# Patient Record
Sex: FEMALE | Race: WHITE | NOT HISPANIC OR LATINO | URBAN - METROPOLITAN AREA
[De-identification: names, ages, dates, MRNs, and addresses within clinical notes are randomized per-mention and may not be internally consistent; named-entity substitution may affect disease eponyms.]

---

## 2017-05-02 ENCOUNTER — EMERGENCY (EMERGENCY)
Facility: HOSPITAL | Age: 67
LOS: 1 days | Discharge: HOME | End: 2017-05-02
Attending: EMERGENCY MEDICINE | Admitting: EMERGENCY MEDICINE
Payer: MEDICARE

## 2017-05-02 VITALS
SYSTOLIC BLOOD PRESSURE: 160 MMHG | DIASTOLIC BLOOD PRESSURE: 90 MMHG | RESPIRATION RATE: 18 BRPM | HEART RATE: 80 BPM | OXYGEN SATURATION: 97 %

## 2017-05-02 VITALS
OXYGEN SATURATION: 97 % | DIASTOLIC BLOOD PRESSURE: 52 MMHG | SYSTOLIC BLOOD PRESSURE: 125 MMHG | HEART RATE: 67 BPM | TEMPERATURE: 98 F | RESPIRATION RATE: 19 BRPM

## 2017-05-02 DIAGNOSIS — F22 DELUSIONAL DISORDERS: ICD-10-CM

## 2017-05-02 DIAGNOSIS — Z98.89 OTHER SPECIFIED POSTPROCEDURAL STATES: Chronic | ICD-10-CM

## 2017-05-02 DIAGNOSIS — Z79.82 LONG TERM (CURRENT) USE OF ASPIRIN: ICD-10-CM

## 2017-05-02 DIAGNOSIS — Z90.89 ACQUIRED ABSENCE OF OTHER ORGANS: ICD-10-CM

## 2017-05-02 DIAGNOSIS — Z79.899 OTHER LONG TERM (CURRENT) DRUG THERAPY: ICD-10-CM

## 2017-05-02 DIAGNOSIS — E78.5 HYPERLIPIDEMIA, UNSPECIFIED: ICD-10-CM

## 2017-05-02 DIAGNOSIS — Z90.49 ACQUIRED ABSENCE OF OTHER SPECIFIED PARTS OF DIGESTIVE TRACT: Chronic | ICD-10-CM

## 2017-05-02 DIAGNOSIS — R26.2 DIFFICULTY IN WALKING, NOT ELSEWHERE CLASSIFIED: ICD-10-CM

## 2017-05-02 DIAGNOSIS — I10 ESSENTIAL (PRIMARY) HYPERTENSION: ICD-10-CM

## 2017-05-02 DIAGNOSIS — E03.9 HYPOTHYROIDISM, UNSPECIFIED: ICD-10-CM

## 2017-05-02 DIAGNOSIS — J44.9 CHRONIC OBSTRUCTIVE PULMONARY DISEASE, UNSPECIFIED: ICD-10-CM

## 2017-05-02 DIAGNOSIS — Z90.49 ACQUIRED ABSENCE OF OTHER SPECIFIED PARTS OF DIGESTIVE TRACT: ICD-10-CM

## 2017-05-02 DIAGNOSIS — F32.9 MAJOR DEPRESSIVE DISORDER, SINGLE EPISODE, UNSPECIFIED: ICD-10-CM

## 2017-05-02 DIAGNOSIS — Z90.5 ACQUIRED ABSENCE OF KIDNEY: Chronic | ICD-10-CM

## 2017-05-02 DIAGNOSIS — F29 UNSPECIFIED PSYCHOSIS NOT DUE TO A SUBSTANCE OR KNOWN PHYSIOLOGICAL CONDITION: ICD-10-CM

## 2017-05-02 LAB
ALBUMIN SERPL ELPH-MCNC: 4 G/DL — SIGNIFICANT CHANGE UP (ref 3.3–5)
ALP SERPL-CCNC: 141 U/L — HIGH (ref 40–120)
ALT FLD-CCNC: 34 U/L RC — SIGNIFICANT CHANGE UP (ref 10–45)
ANION GAP SERPL CALC-SCNC: 18 MMOL/L — HIGH (ref 5–17)
APAP SERPL-MCNC: <15 UG/ML — SIGNIFICANT CHANGE UP (ref 10–30)
AST SERPL-CCNC: 35 U/L — SIGNIFICANT CHANGE UP (ref 10–40)
BASOPHILS # BLD AUTO: 0.1 K/UL — SIGNIFICANT CHANGE UP (ref 0–0.2)
BASOPHILS NFR BLD AUTO: 0.7 % — SIGNIFICANT CHANGE UP (ref 0–2)
BILIRUB SERPL-MCNC: 1.1 MG/DL — SIGNIFICANT CHANGE UP (ref 0.2–1.2)
BUN SERPL-MCNC: 14 MG/DL — SIGNIFICANT CHANGE UP (ref 7–23)
CALCIUM SERPL-MCNC: 10.2 MG/DL — SIGNIFICANT CHANGE UP (ref 8.4–10.5)
CHLORIDE SERPL-SCNC: 89 MMOL/L — LOW (ref 96–108)
CO2 SERPL-SCNC: 26 MMOL/L — SIGNIFICANT CHANGE UP (ref 22–31)
CREAT SERPL-MCNC: 0.84 MG/DL — SIGNIFICANT CHANGE UP (ref 0.5–1.3)
EOSINOPHIL # BLD AUTO: 0.2 K/UL — SIGNIFICANT CHANGE UP (ref 0–0.5)
EOSINOPHIL NFR BLD AUTO: 1.6 % — SIGNIFICANT CHANGE UP (ref 0–6)
GLUCOSE SERPL-MCNC: 115 MG/DL — HIGH (ref 70–99)
HCT VFR BLD CALC: 44.4 % — SIGNIFICANT CHANGE UP (ref 34.5–45)
HGB BLD-MCNC: 15.3 G/DL — SIGNIFICANT CHANGE UP (ref 11.5–15.5)
LYMPHOCYTES # BLD AUTO: 1.4 K/UL — SIGNIFICANT CHANGE UP (ref 1–3.3)
LYMPHOCYTES # BLD AUTO: 12.5 % — LOW (ref 13–44)
MCHC RBC-ENTMCNC: 32.2 PG — SIGNIFICANT CHANGE UP (ref 27–34)
MCHC RBC-ENTMCNC: 34.4 GM/DL — SIGNIFICANT CHANGE UP (ref 32–36)
MCV RBC AUTO: 93.4 FL — SIGNIFICANT CHANGE UP (ref 80–100)
MONOCYTES # BLD AUTO: 0.9 K/UL — SIGNIFICANT CHANGE UP (ref 0–0.9)
MONOCYTES NFR BLD AUTO: 8.2 % — SIGNIFICANT CHANGE UP (ref 2–14)
NEUTROPHILS # BLD AUTO: 8.5 K/UL — HIGH (ref 1.8–7.4)
NEUTROPHILS NFR BLD AUTO: 77 % — SIGNIFICANT CHANGE UP (ref 43–77)
PLATELET # BLD AUTO: 174 K/UL — SIGNIFICANT CHANGE UP (ref 150–400)
POTASSIUM SERPL-MCNC: 3.8 MMOL/L — SIGNIFICANT CHANGE UP (ref 3.5–5.3)
POTASSIUM SERPL-SCNC: 3.8 MMOL/L — SIGNIFICANT CHANGE UP (ref 3.5–5.3)
PROT SERPL-MCNC: 7.9 G/DL — SIGNIFICANT CHANGE UP (ref 6–8.3)
RBC # BLD: 4.75 M/UL — SIGNIFICANT CHANGE UP (ref 3.8–5.2)
RBC # FLD: 13.4 % — SIGNIFICANT CHANGE UP (ref 10.3–14.5)
SALICYLATES SERPL-MCNC: <2 MG/DL — LOW (ref 15–30)
SODIUM SERPL-SCNC: 133 MMOL/L — LOW (ref 135–145)
WBC # BLD: 11 K/UL — HIGH (ref 3.8–10.5)
WBC # FLD AUTO: 11 K/UL — HIGH (ref 3.8–10.5)

## 2017-05-02 PROCEDURE — 99283 EMERGENCY DEPT VISIT LOW MDM: CPT | Mod: 25

## 2017-05-02 PROCEDURE — 93005 ELECTROCARDIOGRAM TRACING: CPT

## 2017-05-02 PROCEDURE — 85027 COMPLETE CBC AUTOMATED: CPT

## 2017-05-02 PROCEDURE — 99285 EMERGENCY DEPT VISIT HI MDM: CPT

## 2017-05-02 PROCEDURE — 80053 COMPREHEN METABOLIC PANEL: CPT

## 2017-05-02 PROCEDURE — 80307 DRUG TEST PRSMV CHEM ANLYZR: CPT

## 2017-05-02 PROCEDURE — 93010 ELECTROCARDIOGRAM REPORT: CPT

## 2017-05-02 PROCEDURE — 99284 EMERGENCY DEPT VISIT MOD MDM: CPT | Mod: 25,GC

## 2017-05-02 NOTE — ED PROVIDER NOTE - PROGRESS NOTE DETAILS
Micah: Patient signed out to f/u  picking patient up from hospital. Nursing staff and resident went to check on patient and patient not in chair. Patient paged overhead. Called  number and patient answered and states she is at home. Resident spoke with patient  and states he came to  patient and they left. Patient is safely at home.

## 2017-05-02 NOTE — ED PROVIDER NOTE - FAMILY HISTORY
<<-----Click on this checkbox to enter Family History Family history of myocardial infarction, mother  at age 67 and father at age 67 of MI's     Family history of hypertension, mother and father     Sibling  Still living? Yes, Estimated age: Age Unknown  Family history of diabetes mellitus, Age at diagnosis: Age Unknown  Family history of heart disease, Age at diagnosis: Age Unknown     Child  Still living? Yes, Estimated age: Age Unknown  Family history of thyroid cancer, Age at diagnosis: Age Unknown

## 2017-05-02 NOTE — ED BEHAVIORAL HEALTH ASSESSMENT NOTE - NS ED BHA PLAN TR BH CONTACTED FT
Called Dr. De La Paz's office, he was unavailable but  confirmed upcoming appointment. Called Dr. De La Paz's office, confirmed follow up appt for patient, Dr. De La Paz wasn't available

## 2017-05-02 NOTE — ED BEHAVIORAL HEALTH ASSESSMENT NOTE - OTHER PAST PSYCHIATRIC HISTORY (INCLUDE DETAILS REGARDING ONSET, COURSE OF ILLNESS, INPATIENT/OUTPATIENT TREATMENT)
The patient has reportedly been hospitalized psychiatrically 5 times in the past. most recent at [facility] in 2015. The patient received ECT during that hospitalization. She carries diagnoses of major depressive disorder and delusional disorder, and currently follows up with Dr. Emiliano De La Paz as an outpatient. The patient has reportedly been hospitalized psychiatrically 5 times in the past. most recent at [facility] in 11/2015. The patient received ECT during that hospitalization. She carries diagnoses of major depressive disorder and delusional disorder, and currently follows up with Dr. Emiliano De La Paz as an outpatient.

## 2017-05-02 NOTE — ED PROVIDER NOTE - PMH
Anxiety    Breast cancer in situ, left    COPD (chronic obstructive pulmonary disease)    DVT (deep venous thrombosis)  history of- not presently on A/C  Graves disease    Hyperlipidemia    Hypertension    Hypothyroid    Kidney cancer, primary, with metastasis from kidney to other site, left  LEft sided- s/p nephrectomy only- no chemo or RT  Obesity

## 2017-05-02 NOTE — ED BEHAVIORAL HEALTH ASSESSMENT NOTE - DIFFERENTIAL
Psychotic disorder not otherwise specified vs. delusional disorder vs. schizophrenia vs. major depressive disorder with psychotic features vs. delirium vs. dementia Psychotic disorder not otherwise specified vs. delusional disorder vs.. major depressive disorder with psychotic features
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
## 2017-05-02 NOTE — ED BEHAVIORAL HEALTH ASSESSMENT NOTE - DESCRIPTION
The patient has been calling out for her  to help her, crying intermittently. The patient received Ativan PRN for anxiety. She has been medically evaluated and cleared from a medical perspective. Breast cancer, COPD, DVT, Grave's disease, HLD, HTN, hypothyroidism, kidney cancer, s/p nephrectomy, s/p laminectomy, s/p cholecystectomy, s/p parathyroidectomy The patient currently resides in a private residence with her  and adult son, who is developmentally delayed. She has a total of 3 children and has been  for approximately 47 years. The patient completed a 2-year college degree. She is currently unemployed, but used to work in retail. Patient has been cooperative and calm in ED.

## 2017-05-02 NOTE — ED PROVIDER NOTE - ATTENDING CONTRIBUTION TO CARE
****ATTENDING**** 65yo female hx hypothyroid, HTN, Asthma presents co asthma exacerbation. States her  is trying to hurt her by stimulating her asthma. She feels unsafe at home so she came here. On exam, Patient is awake,alert,oriented x 3. Patient's chest is clear to ausculation, +s1s2. Abdomen is soft nd/nt +BS. Patient w no wheezing at this time. CHeck Labs, Xray chest. Consult social work and psych.

## 2017-05-02 NOTE — ED BEHAVIORAL HEALTH ASSESSMENT NOTE - OTHER
Spouse Unable to ambulate at present where bed available Unable to care for self Pt reports now being able to walk 450 ft in physical therapy and looks fwd to walking again.
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
## 2017-05-02 NOTE — ED BEHAVIORAL HEALTH ASSESSMENT NOTE - CURRENT MEDICATION
Risperdal 0.5mg qhs, ativan 1mg TID, levothyroxine, pantoprazole 40mg, folic acid 1 mg, amlodipine, asa 81 qd, atenolol, oxycodone 5mg QID PRN, lipitor 40 qhs, duloxetine 60mg qhs, mirtazapine 7.5mg qhs, olmosertan 40mg, Vitamin B12, Vitamin D3, gabapentin 400mg qhs, inhalers for asthma- proair and incruse, nebulizer.

## 2017-05-02 NOTE — ED BEHAVIORAL HEALTH ASSESSMENT NOTE - SUICIDE PROTECTIVE FACTORS
Supportive social network or family/Identifies reasons for living/Positive therapeutic relationships Supportive social network or family/Positive therapeutic relationships/Identifies reasons for living/Future oriented/Other

## 2017-05-02 NOTE — ED PROVIDER NOTE - PSH
History of carpal tunnel release  right wrist  History of eye surgery  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  History of parathyroidectomy    History of pelvic surgery  Pelvic Sling  S/P breast biopsy  left  S/P cholecystectomy    S/P laminectomy  now bed and wheelchair ridden with severe pain  S/p nephrectomy  left

## 2017-05-02 NOTE — ED BEHAVIORAL HEALTH ASSESSMENT NOTE - RISK ASSESSMENT
The patient is at moderate risk for acute and chronic suicide and homicide. Her risk factors include current psychotic episode, impulsivity, multiple chronic medical conditions, unemployed/disabled, anxiety, insomnia, and history of aggression. Her protective factors include no current suicidal or homicidal ideation, no history of suicide attempts, living with family, caretaker status, good social supports, no access to firearms, and no current substance abuse. The patient is at minimal risk for acute and chronic suicide and homicide. Her risk factors include current psychotic episode, impulsivity, multiple chronic medical conditions, unemployed/disabled, anxiety, insomnia, and history of aggression. Her protective factors include no current suicidal or homicidal ideation, no history of suicide attempts, living with family, caretaker status, good social supports, no access to firearms, looking forward to walking again and recent progress made in physical therapy with walking, and no current substance abuse. The patient is at minimal risk for acute and chronic suicide and homicide. Her risk factors include current paranoia, impulsivity, multiple chronic medical conditions, unemployed/disabled, anxiety, insomnia, and history of aggression. Her protective factors include no current suicidal or homicidal ideation, no history of suicide attempts, living with family, caretaker status, good social supports, no access to firearms, looking forward to walking again and recent progress made in physical therapy with walking, and no current substance abuse.
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
## 2017-05-02 NOTE — ED BEHAVIORAL HEALTH ASSESSMENT NOTE - ADDITIONAL DETAILS / COMMENTS
The patient was unable to participate in a full MMSE, but was unable to speel "WORLD" backwards or recall 3 words. She was able to follow a 3-step command, name 2 objects, repeat a phrase, and register 3 words.

## 2017-05-02 NOTE — ED ADULT NURSE NOTE - ADDITIONAL PRINTED INSTRUCTIONS GIVEN
Pt was waiting for d/c and left prior to signing paper work. MD Oswald made aware and pt called, states she is home and safe. MD states no further intervention is needed.
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
## 2017-05-14 ENCOUNTER — INPATIENT (INPATIENT)
Facility: HOSPITAL | Age: 67
LOS: 4 days | Discharge: HOME | DRG: 880 | End: 2017-05-19
Attending: GENERAL PRACTICE | Admitting: GENERAL PRACTICE
Payer: MEDICARE

## 2017-05-14 VITALS
OXYGEN SATURATION: 99 % | TEMPERATURE: 99 F | RESPIRATION RATE: 20 BRPM | SYSTOLIC BLOOD PRESSURE: 154 MMHG | HEART RATE: 88 BPM | DIASTOLIC BLOOD PRESSURE: 88 MMHG

## 2017-05-14 DIAGNOSIS — Z90.49 ACQUIRED ABSENCE OF OTHER SPECIFIED PARTS OF DIGESTIVE TRACT: Chronic | ICD-10-CM

## 2017-05-14 DIAGNOSIS — Z98.89 OTHER SPECIFIED POSTPROCEDURAL STATES: Chronic | ICD-10-CM

## 2017-05-14 DIAGNOSIS — R25.1 TREMOR, UNSPECIFIED: ICD-10-CM

## 2017-05-14 DIAGNOSIS — Z90.5 ACQUIRED ABSENCE OF KIDNEY: Chronic | ICD-10-CM

## 2017-05-14 LAB
ALBUMIN SERPL ELPH-MCNC: 4.3 G/DL — SIGNIFICANT CHANGE UP (ref 3.3–5)
ALP SERPL-CCNC: 148 U/L — HIGH (ref 40–120)
ALT FLD-CCNC: 46 U/L RC — HIGH (ref 10–45)
ANION GAP SERPL CALC-SCNC: 19 MMOL/L — HIGH (ref 5–17)
APTT BLD: 37.9 SEC — HIGH (ref 27.5–37.4)
AST SERPL-CCNC: 37 U/L — SIGNIFICANT CHANGE UP (ref 10–40)
BASOPHILS # BLD AUTO: 0 K/UL — SIGNIFICANT CHANGE UP (ref 0–0.2)
BASOPHILS NFR BLD AUTO: 0.1 % — SIGNIFICANT CHANGE UP (ref 0–2)
BILIRUB SERPL-MCNC: 1 MG/DL — SIGNIFICANT CHANGE UP (ref 0.2–1.2)
BUN SERPL-MCNC: 11 MG/DL — SIGNIFICANT CHANGE UP (ref 7–23)
CALCIUM SERPL-MCNC: 10.4 MG/DL — SIGNIFICANT CHANGE UP (ref 8.4–10.5)
CHLORIDE SERPL-SCNC: 93 MMOL/L — LOW (ref 96–108)
CK MB CFR SERPL CALC: 2.9 NG/ML — SIGNIFICANT CHANGE UP (ref 0–3.8)
CK SERPL-CCNC: 86 U/L — SIGNIFICANT CHANGE UP (ref 25–170)
CO2 SERPL-SCNC: 23 MMOL/L — SIGNIFICANT CHANGE UP (ref 22–31)
CREAT SERPL-MCNC: 0.8 MG/DL — SIGNIFICANT CHANGE UP (ref 0.5–1.3)
EOSINOPHIL # BLD AUTO: 0 K/UL — SIGNIFICANT CHANGE UP (ref 0–0.5)
EOSINOPHIL NFR BLD AUTO: 0 % — SIGNIFICANT CHANGE UP (ref 0–6)
GAS PNL BLDV: SIGNIFICANT CHANGE UP
GLUCOSE SERPL-MCNC: 159 MG/DL — HIGH (ref 70–99)
HCT VFR BLD CALC: 47.2 % — HIGH (ref 34.5–45)
HGB BLD-MCNC: 16.2 G/DL — HIGH (ref 11.5–15.5)
INR BLD: 1.1 RATIO — SIGNIFICANT CHANGE UP (ref 0.88–1.16)
LYMPHOCYTES # BLD AUTO: 1 K/UL — SIGNIFICANT CHANGE UP (ref 1–3.3)
LYMPHOCYTES # BLD AUTO: 10.4 % — LOW (ref 13–44)
MCHC RBC-ENTMCNC: 32.9 PG — SIGNIFICANT CHANGE UP (ref 27–34)
MCHC RBC-ENTMCNC: 34.4 GM/DL — SIGNIFICANT CHANGE UP (ref 32–36)
MCV RBC AUTO: 95.6 FL — SIGNIFICANT CHANGE UP (ref 80–100)
MONOCYTES # BLD AUTO: 0.1 K/UL — SIGNIFICANT CHANGE UP (ref 0–0.9)
MONOCYTES NFR BLD AUTO: 1 % — LOW (ref 2–14)
NEUTROPHILS # BLD AUTO: 8.6 K/UL — HIGH (ref 1.8–7.4)
NEUTROPHILS NFR BLD AUTO: 88.5 % — HIGH (ref 43–77)
PLATELET # BLD AUTO: 160 K/UL — SIGNIFICANT CHANGE UP (ref 150–400)
POTASSIUM SERPL-MCNC: 4 MMOL/L — SIGNIFICANT CHANGE UP (ref 3.5–5.3)
POTASSIUM SERPL-SCNC: 4 MMOL/L — SIGNIFICANT CHANGE UP (ref 3.5–5.3)
PROT SERPL-MCNC: 8.3 G/DL — SIGNIFICANT CHANGE UP (ref 6–8.3)
PROTHROM AB SERPL-ACNC: 12 SEC — SIGNIFICANT CHANGE UP (ref 9.8–12.7)
RBC # BLD: 4.94 M/UL — SIGNIFICANT CHANGE UP (ref 3.8–5.2)
RBC # FLD: 13.1 % — SIGNIFICANT CHANGE UP (ref 10.3–14.5)
SODIUM SERPL-SCNC: 135 MMOL/L — SIGNIFICANT CHANGE UP (ref 135–145)
TROPONIN T SERPL-MCNC: <0.01 NG/ML — SIGNIFICANT CHANGE UP (ref 0–0.06)
WBC # BLD: 9.8 K/UL — SIGNIFICANT CHANGE UP (ref 3.8–10.5)
WBC # FLD AUTO: 9.8 K/UL — SIGNIFICANT CHANGE UP (ref 3.8–10.5)

## 2017-05-14 PROCEDURE — 93010 ELECTROCARDIOGRAM REPORT: CPT

## 2017-05-14 PROCEDURE — 99285 EMERGENCY DEPT VISIT HI MDM: CPT | Mod: 25,GC

## 2017-05-14 RX ORDER — IPRATROPIUM/ALBUTEROL SULFATE 18-103MCG
3 AEROSOL WITH ADAPTER (GRAM) INHALATION ONCE
Qty: 0 | Refills: 0 | Status: COMPLETED | OUTPATIENT
Start: 2017-05-14 | End: 2017-05-14

## 2017-05-14 RX ORDER — ASPIRIN/CALCIUM CARB/MAGNESIUM 324 MG
162 TABLET ORAL ONCE
Qty: 0 | Refills: 0 | Status: COMPLETED | OUTPATIENT
Start: 2017-05-14 | End: 2017-05-14

## 2017-05-14 RX ADMIN — Medication 3 MILLILITER(S): at 22:45

## 2017-05-14 NOTE — ED PROVIDER NOTE - OBJECTIVE STATEMENT
BIBEMS for HTN BIBEMS for HTN and tremors. When pt was seen by the EMS providers, pt had UE tremors, but no LOC or AMS.   Recently discharged from [facility] for reported allergic reaction with throat closure and difficulty swallowing.   Patient w/ L sided headache and chest tightness for 6 weeks. Has been having upper extremity shaking that patient believes is seizures, has been present for months, and seen by multiple medical providers for this issue. No Hx of seizures.     PMD: Gorge Johns

## 2017-05-14 NOTE — ED PROVIDER NOTE - NS ED ROS FT
No fever, no chills, no change in vision, no change in hearing, + chest pain, + shortness of breath, no abdominal pain, no vomiting, no dysuria, no muscle pain, no rashes, no loss of consciousness. ~ Thanh Pack MD

## 2017-05-14 NOTE — ED ADULT NURSE NOTE - OBJECTIVE STATEMENT
66 y.o. female presents to ED via EMS c/o HTN, HA, CP. States she was seen at [facility] for allergic reaction - states she is allergic to scents and fragrance. Denies allergies to medications. Treated her with prednisone and d/c. States due to the allergic reaction she has had difficulty swallowing and is afraid to take her BP meds 66 y.o. female presents to ED via EMS c/o HTN, HA, CP. States she was seen at [facility] for allergic reaction - states she is allergic to scents and fragrance. Denies allergies to medications. Treated her with prednisone and d/c. States due to the allergic reaction she has had difficulty swallowing and is afraid to take her BP meds, pt normotensive upon exam. c/o HA, CP and tremor which has been ongoing over past few weeks, worse today. Positive stage two pressure injury to L buttock. Patient states she ambulates short distances at home with walker, and her  is her primary care giver. Denies abd pain, NVD, dysuria, hematuria, cough, fever, chills, body aches. Patient undressed and placed into gown, call bell in hand and side rails up with bed in lowest position for safety. blanket provided, vital signs stable. Comfort and safety provided.

## 2017-05-14 NOTE — ED PROVIDER NOTE - ATTENDING CONTRIBUTION TO CARE
Patient is a 65 yo female with PMH of htn, hl, hypothyroid, breast and renal cancer no longer active, anxiety presenting with multiple medical complaints and sent in for admission by Dr. Salcedo. Patients main complaints are dyspahgia and ue tremors. No f/s/c/n/v/diarrhea or cp or sob. On exam, avss, anxious, cta bl, s1, s2, rrr, osft nt nd no r/g/r, eomi, perrla, cn ii-xii normal, moving all ext, wwp ext.

## 2017-05-14 NOTE — ED PROVIDER NOTE - PHYSICAL EXAMINATION
Physical Exam: elderly F who is very anxious-appearing and tearful and upset during the examination, AAOx3, NCAT, MMM, neck is supple, PERRLA, CTAB w/o rales, intermittent tachycardia and regular rhythm, abdomen is soft and NTND, 3+ LE edema bilateral, No deformity of extremities, No rashes, CN II-XII intact, No focal motor or sensory deficits. 5/5 strength, Tremors of the upper extremities bilaterally, that is distractable upon questioning, and worse w/ intention. ~ Thanh Pack MD

## 2017-05-15 DIAGNOSIS — F41.9 ANXIETY DISORDER, UNSPECIFIED: ICD-10-CM

## 2017-05-15 DIAGNOSIS — N30.90 CYSTITIS, UNSPECIFIED WITHOUT HEMATURIA: ICD-10-CM

## 2017-05-15 DIAGNOSIS — J44.9 CHRONIC OBSTRUCTIVE PULMONARY DISEASE, UNSPECIFIED: ICD-10-CM

## 2017-05-15 DIAGNOSIS — E03.9 HYPOTHYROIDISM, UNSPECIFIED: ICD-10-CM

## 2017-05-15 DIAGNOSIS — R07.89 OTHER CHEST PAIN: ICD-10-CM

## 2017-05-15 DIAGNOSIS — R73.9 HYPERGLYCEMIA, UNSPECIFIED: ICD-10-CM

## 2017-05-15 DIAGNOSIS — Z29.9 ENCOUNTER FOR PROPHYLACTIC MEASURES, UNSPECIFIED: ICD-10-CM

## 2017-05-15 LAB
ANION GAP SERPL CALC-SCNC: 15 MMOL/L — SIGNIFICANT CHANGE UP (ref 5–17)
APPEARANCE UR: CLEAR — SIGNIFICANT CHANGE UP
BACTERIA # UR AUTO: ABNORMAL /HPF
BILIRUB UR-MCNC: NEGATIVE — SIGNIFICANT CHANGE UP
BUN SERPL-MCNC: 12 MG/DL — SIGNIFICANT CHANGE UP (ref 7–23)
CALCIUM SERPL-MCNC: 10.3 MG/DL — SIGNIFICANT CHANGE UP (ref 8.4–10.5)
CHLORIDE SERPL-SCNC: 97 MMOL/L — SIGNIFICANT CHANGE UP (ref 96–108)
CK MB CFR SERPL CALC: 2.4 NG/ML — SIGNIFICANT CHANGE UP (ref 0–3.8)
CK MB CFR SERPL CALC: 2.4 NG/ML — SIGNIFICANT CHANGE UP (ref 0–3.8)
CK MB CFR SERPL CALC: 2.7 NG/ML — SIGNIFICANT CHANGE UP (ref 0–3.8)
CK SERPL-CCNC: 69 U/L — SIGNIFICANT CHANGE UP (ref 25–170)
CK SERPL-CCNC: 78 U/L — SIGNIFICANT CHANGE UP (ref 25–170)
CK SERPL-CCNC: 79 U/L — SIGNIFICANT CHANGE UP (ref 25–170)
CO2 SERPL-SCNC: 24 MMOL/L — SIGNIFICANT CHANGE UP (ref 22–31)
COLOR SPEC: SIGNIFICANT CHANGE UP
CREAT SERPL-MCNC: 0.69 MG/DL — SIGNIFICANT CHANGE UP (ref 0.5–1.3)
DIFF PNL FLD: ABNORMAL
EPI CELLS # UR: SIGNIFICANT CHANGE UP /HPF
FOLATE SERPL-MCNC: >20 NG/ML — SIGNIFICANT CHANGE UP (ref 4.8–24.2)
GLUCOSE SERPL-MCNC: 104 MG/DL — HIGH (ref 70–99)
GLUCOSE UR QL: NEGATIVE — SIGNIFICANT CHANGE UP
HCT VFR BLD CALC: 43.6 % — SIGNIFICANT CHANGE UP (ref 34.5–45)
HGB BLD-MCNC: 14.6 G/DL — SIGNIFICANT CHANGE UP (ref 11.5–15.5)
KETONES UR-MCNC: NEGATIVE — SIGNIFICANT CHANGE UP
LEUKOCYTE ESTERASE UR-ACNC: ABNORMAL
MCHC RBC-ENTMCNC: 30.9 PG — SIGNIFICANT CHANGE UP (ref 27–34)
MCHC RBC-ENTMCNC: 33.5 GM/DL — SIGNIFICANT CHANGE UP (ref 32–36)
MCV RBC AUTO: 92.4 FL — SIGNIFICANT CHANGE UP (ref 80–100)
NITRITE UR-MCNC: POSITIVE
PH UR: 6.5 — SIGNIFICANT CHANGE UP (ref 5–8)
PLATELET # BLD AUTO: 191 K/UL — SIGNIFICANT CHANGE UP (ref 150–400)
POTASSIUM SERPL-MCNC: 3.7 MMOL/L — SIGNIFICANT CHANGE UP (ref 3.5–5.3)
POTASSIUM SERPL-SCNC: 3.7 MMOL/L — SIGNIFICANT CHANGE UP (ref 3.5–5.3)
PROT UR-MCNC: NEGATIVE — SIGNIFICANT CHANGE UP
RBC # BLD: 4.72 M/UL — SIGNIFICANT CHANGE UP (ref 3.8–5.2)
RBC # FLD: 14.2 % — SIGNIFICANT CHANGE UP (ref 10.3–14.5)
SODIUM SERPL-SCNC: 136 MMOL/L — SIGNIFICANT CHANGE UP (ref 135–145)
SP GR SPEC: 1 — LOW (ref 1.01–1.02)
T PALLIDUM AB TITR SER: NEGATIVE — SIGNIFICANT CHANGE UP
T4 FREE SERPL-MCNC: 1.7 NG/DL — SIGNIFICANT CHANGE UP (ref 0.9–1.8)
TROPONIN T SERPL-MCNC: 0.02 NG/ML — SIGNIFICANT CHANGE UP (ref 0–0.06)
TROPONIN T SERPL-MCNC: <0.01 NG/ML — SIGNIFICANT CHANGE UP (ref 0–0.06)
TROPONIN T SERPL-MCNC: <0.01 NG/ML — SIGNIFICANT CHANGE UP (ref 0–0.06)
TSH SERPL-MCNC: 0.56 UIU/ML — SIGNIFICANT CHANGE UP (ref 0.27–4.2)
UROBILINOGEN FLD QL: NEGATIVE — SIGNIFICANT CHANGE UP
VIT B12 SERPL-MCNC: 1738 PG/ML — HIGH (ref 243–894)
WBC # BLD: 11.39 K/UL — HIGH (ref 3.8–10.5)
WBC # FLD AUTO: 11.39 K/UL — HIGH (ref 3.8–10.5)
WBC UR QL: >50 /HPF (ref 0–5)

## 2017-05-15 PROCEDURE — 99223 1ST HOSP IP/OBS HIGH 75: CPT

## 2017-05-15 PROCEDURE — 95819 EEG AWAKE AND ASLEEP: CPT | Mod: 26

## 2017-05-15 PROCEDURE — 95957 EEG DIGITAL ANALYSIS: CPT | Mod: 26

## 2017-05-15 PROCEDURE — 93010 ELECTROCARDIOGRAM REPORT: CPT

## 2017-05-15 PROCEDURE — 70551 MRI BRAIN STEM W/O DYE: CPT | Mod: 26

## 2017-05-15 RX ORDER — ACETAMINOPHEN 500 MG
1000 TABLET ORAL ONCE
Qty: 0 | Refills: 0 | Status: COMPLETED | OUTPATIENT
Start: 2017-05-15 | End: 2017-05-15

## 2017-05-15 RX ORDER — BUDESONIDE, MICRONIZED 100 %
1 POWDER (GRAM) MISCELLANEOUS
Qty: 0 | Refills: 0 | Status: DISCONTINUED | OUTPATIENT
Start: 2017-05-15 | End: 2017-05-19

## 2017-05-15 RX ORDER — ACETAMINOPHEN 500 MG
650 TABLET ORAL EVERY 6 HOURS
Qty: 0 | Refills: 0 | Status: DISCONTINUED | OUTPATIENT
Start: 2017-05-15 | End: 2017-05-19

## 2017-05-15 RX ORDER — MIRTAZAPINE 45 MG/1
15 TABLET, ORALLY DISINTEGRATING ORAL AT BEDTIME
Qty: 0 | Refills: 0 | Status: DISCONTINUED | OUTPATIENT
Start: 2017-05-15 | End: 2017-05-16

## 2017-05-15 RX ORDER — OXYCODONE HYDROCHLORIDE 5 MG/1
5 TABLET ORAL EVERY 6 HOURS
Qty: 0 | Refills: 0 | Status: DISCONTINUED | OUTPATIENT
Start: 2017-05-15 | End: 2017-05-19

## 2017-05-15 RX ORDER — CEFTRIAXONE 500 MG/1
1 INJECTION, POWDER, FOR SOLUTION INTRAMUSCULAR; INTRAVENOUS ONCE
Qty: 0 | Refills: 0 | Status: COMPLETED | OUTPATIENT
Start: 2017-05-15 | End: 2017-05-15

## 2017-05-15 RX ORDER — LEVOTHYROXINE SODIUM 125 MCG
125 TABLET ORAL DAILY
Qty: 0 | Refills: 0 | Status: DISCONTINUED | OUTPATIENT
Start: 2017-05-15 | End: 2017-05-19

## 2017-05-15 RX ORDER — AMLODIPINE BESYLATE 2.5 MG/1
2.5 TABLET ORAL DAILY
Qty: 0 | Refills: 0 | Status: DISCONTINUED | OUTPATIENT
Start: 2017-05-15 | End: 2017-05-19

## 2017-05-15 RX ORDER — HEPARIN SODIUM 5000 [USP'U]/ML
5000 INJECTION INTRAVENOUS; SUBCUTANEOUS EVERY 8 HOURS
Qty: 0 | Refills: 0 | Status: DISCONTINUED | OUTPATIENT
Start: 2017-05-15 | End: 2017-05-19

## 2017-05-15 RX ORDER — CEFTRIAXONE 500 MG/1
1 INJECTION, POWDER, FOR SOLUTION INTRAMUSCULAR; INTRAVENOUS EVERY 24 HOURS
Qty: 0 | Refills: 0 | Status: DISCONTINUED | OUTPATIENT
Start: 2017-05-16 | End: 2017-05-18

## 2017-05-15 RX ORDER — ATENOLOL 25 MG/1
50 TABLET ORAL DAILY
Qty: 0 | Refills: 0 | Status: DISCONTINUED | OUTPATIENT
Start: 2017-05-15 | End: 2017-05-19

## 2017-05-15 RX ORDER — ATORVASTATIN CALCIUM 80 MG/1
40 TABLET, FILM COATED ORAL AT BEDTIME
Qty: 0 | Refills: 0 | Status: DISCONTINUED | OUTPATIENT
Start: 2017-05-15 | End: 2017-05-19

## 2017-05-15 RX ORDER — PREGABALIN 225 MG/1
100 CAPSULE ORAL DAILY
Qty: 0 | Refills: 0 | Status: DISCONTINUED | OUTPATIENT
Start: 2017-05-15 | End: 2017-05-19

## 2017-05-15 RX ORDER — ASPIRIN/CALCIUM CARB/MAGNESIUM 324 MG
81 TABLET ORAL DAILY
Qty: 0 | Refills: 0 | Status: DISCONTINUED | OUTPATIENT
Start: 2017-05-15 | End: 2017-05-19

## 2017-05-15 RX ORDER — DULOXETINE HYDROCHLORIDE 30 MG/1
60 CAPSULE, DELAYED RELEASE ORAL DAILY
Qty: 0 | Refills: 0 | Status: DISCONTINUED | OUTPATIENT
Start: 2017-05-15 | End: 2017-05-19

## 2017-05-15 RX ORDER — CEFTRIAXONE 500 MG/1
INJECTION, POWDER, FOR SOLUTION INTRAMUSCULAR; INTRAVENOUS
Qty: 0 | Refills: 0 | Status: DISCONTINUED | OUTPATIENT
Start: 2017-05-15 | End: 2017-05-18

## 2017-05-15 RX ORDER — IPRATROPIUM/ALBUTEROL SULFATE 18-103MCG
3 AEROSOL WITH ADAPTER (GRAM) INHALATION EVERY 6 HOURS
Qty: 0 | Refills: 0 | Status: DISCONTINUED | OUTPATIENT
Start: 2017-05-15 | End: 2017-05-19

## 2017-05-15 RX ORDER — RISPERIDONE 4 MG/1
2 TABLET ORAL DAILY
Qty: 0 | Refills: 0 | Status: DISCONTINUED | OUTPATIENT
Start: 2017-05-15 | End: 2017-05-17

## 2017-05-15 RX ORDER — METOPROLOL TARTRATE 50 MG
5 TABLET ORAL ONCE
Qty: 0 | Refills: 0 | Status: DISCONTINUED | OUTPATIENT
Start: 2017-05-15 | End: 2017-05-15

## 2017-05-15 RX ORDER — FOLIC ACID 0.8 MG
1 TABLET ORAL DAILY
Qty: 0 | Refills: 0 | Status: DISCONTINUED | OUTPATIENT
Start: 2017-05-15 | End: 2017-05-19

## 2017-05-15 RX ORDER — METOPROLOL TARTRATE 50 MG
5 TABLET ORAL EVERY 6 HOURS
Qty: 0 | Refills: 0 | Status: DISCONTINUED | OUTPATIENT
Start: 2017-05-15 | End: 2017-05-15

## 2017-05-15 RX ORDER — CHOLECALCIFEROL (VITAMIN D3) 125 MCG
2000 CAPSULE ORAL DAILY
Qty: 0 | Refills: 0 | Status: DISCONTINUED | OUTPATIENT
Start: 2017-05-15 | End: 2017-05-19

## 2017-05-15 RX ORDER — GABAPENTIN 400 MG/1
400 CAPSULE ORAL
Qty: 0 | Refills: 0 | Status: DISCONTINUED | OUTPATIENT
Start: 2017-05-15 | End: 2017-05-19

## 2017-05-15 RX ORDER — SODIUM CHLORIDE 9 MG/ML
500 INJECTION INTRAMUSCULAR; INTRAVENOUS; SUBCUTANEOUS ONCE
Qty: 0 | Refills: 0 | Status: COMPLETED | OUTPATIENT
Start: 2017-05-14 | End: 2017-05-15

## 2017-05-15 RX ADMIN — Medication 3 MILLILITER(S): at 18:50

## 2017-05-15 RX ADMIN — HEPARIN SODIUM 5000 UNIT(S): 5000 INJECTION INTRAVENOUS; SUBCUTANEOUS at 21:46

## 2017-05-15 RX ADMIN — CEFTRIAXONE 100 GRAM(S): 500 INJECTION, POWDER, FOR SOLUTION INTRAMUSCULAR; INTRAVENOUS at 00:44

## 2017-05-15 RX ADMIN — Medication 400 MILLIGRAM(S): at 21:38

## 2017-05-15 RX ADMIN — Medication 1000 MILLIGRAM(S): at 02:25

## 2017-05-15 RX ADMIN — Medication 3 MILLILITER(S): at 07:42

## 2017-05-15 RX ADMIN — HEPARIN SODIUM 5000 UNIT(S): 5000 INJECTION INTRAVENOUS; SUBCUTANEOUS at 14:56

## 2017-05-15 RX ADMIN — Medication 400 MILLIGRAM(S): at 01:55

## 2017-05-15 RX ADMIN — Medication 0.5 MILLIGRAM(S): at 14:55

## 2017-05-15 RX ADMIN — Medication 1 PUFF(S): at 19:23

## 2017-05-15 RX ADMIN — SODIUM CHLORIDE 500 MILLILITER(S): 9 INJECTION INTRAMUSCULAR; INTRAVENOUS; SUBCUTANEOUS at 00:44

## 2017-05-15 RX ADMIN — CEFTRIAXONE 100 GRAM(S): 500 INJECTION, POWDER, FOR SOLUTION INTRAMUSCULAR; INTRAVENOUS at 23:57

## 2017-05-15 NOTE — H&P ADULT. - EXTREMITIES COMMENTS
B/L >LE with ACE dressing c/d/i>>PATIENT REFUSES TO HAVE EXAMINER EXAMINE SKIN UNDER ACE BANDAGES--AGREES TO WOUND CARE RN EVALUATION.

## 2017-05-15 NOTE — H&P ADULT. - PROBLEM SELECTOR PLAN 4
On benzodiazepines as above.  Patient dependent on Ativan and will have to continue to avoid withdrawal.  Patient presently declines psychiatry assessment.

## 2017-05-15 NOTE — H&P ADULT. - PROBLEM SELECTOR PLAN 1
See above.  Transferred to telemetry.   Would reorder chest radiograph (patient refused initial attempt).  Serial enzymes, echo.  Would attempt to clarify prior work-up following abnormal stress from 2015.

## 2017-05-15 NOTE — H&P ADULT. - HISTORY OF PRESENT ILLNESS
NIGHT HOSPITALIST:  Patient UNKNOWN to me previously, assigned to me via the ER and by Dr. Welch to admit this 67 y/o F--patient previously followed by Dr. Venegas above but apparently followed by a physician in [facility], but is in the process of seeing Dr. FELY Salcedo in the office--history and medication list from patient with latter from patient's spouse via phone-- patient with a history or localized breast CA with past tylectomy presumed to be in remission since 1997, hypernephroma S/P partial nephrectomy presumed to be disease free, COPD with patient quit tobacco in 1997 (1 ppd x 25 years cigarettes), complicated course following 2 laminectomies with progressive functional decline with transient decrease in ADL, chronic pain syndrome, chronic anxiety disorder benzodiazepine dependent ([facility] provided to chart).  Patient reports that she was just discharged 2 days ago from [facility] (from the ER?) with patient with same presentation with patient receiving systemic steroids and was discharged.   Patient notes a globus sensation and is afraid of choking with these episodes.  The patient is concerned when examiner corroborated the patient's history and medication list with the patient's spouse that her spouse would ascribe everything to anxiety.  Patient denies chest pain/pressure at present.  No dyspnea.  No throat pain.  No fever, no chills, no rigors.  No abdominal pain, no red blood per rectum or melena.  No back pain, no tearing back pain.  Patient is requesting all medications to be IV--including patient's benzodiazepine.  No suicidal or homicidal ideation.  No anhedonia, but patient anxious.  Remaining review of systems not contributory. NIGHT HOSPITALIST:  Patient UNKNOWN to me previously, assigned to me via the ER and by Dr. Welch to admit this 67 y/o F--patient previously followed by Dr. Venegas above but apparently followed by a physician in [facility], but is in the process of seeing Dr. FELY Salcedo in the office--history and medication list from patient with latter from patient's spouse via phone-- patient with a history or localized breast CA with past tylectomy presumed to be in remission since 1997, abnormal pharmacologic cardiac stress in 11/2015, hypernephroma S/P partial nephrectomy presumed to be disease free, COPD with patient quit tobacco in 1997 (1 ppd x 25 years cigarettes), complicated course following 2 laminectomies with progressive functional decline with transient decrease in ADL, chronic pain syndrome, chronic anxiety disorder benzodiazepine dependent ([facility] provided to chart).  Patient reports that she was just discharged 2 days ago from [facility] (from the ER?) with patient with same presentation with patient receiving systemic steroids and was discharged.   Patient notes a globus sensation and is afraid of choking with these episodes.  The patient is concerned when examiner corroborated the patient's history and medication list with the patient's spouse that her spouse would ascribe everything to anxiety.  Patient denies chest pain/pressure at present.  No dyspnea.  No throat pain.  No fever, no chills, no rigors.  No abdominal pain, no red blood per rectum or melena.  No back pain, no tearing back pain.  Patient is requesting all medications to be IV--including patient's benzodiazepine.  No suicidal or homicidal ideation.  No anhedonia, but patient anxious.  Remaining review of systems not contributory. NIGHT HOSPITALIST:  Patient UNKNOWN to me previously, assigned to me via the ER and by Dr. Welch to admit this 65 y/o F--patient previously followed by Dr. Venegas above but apparently followed by a physician in [facility], but is in the process of seeing Dr. FELY Salcedo in the office--history and medication list from patient with latter from patient's spouse via phone-- patient with a history or localized breast CA with past tylectomy presumed to be in remission since 1997, abnormal pharmacologic cardiac stress in 11/2015, hypernephroma S/P partial nephrectomy presumed to be disease free, COPD with patient quit tobacco in 1997 (1 ppd x 25 years cigarettes), complicated course following 2 laminectomies with progressive functional decline with transient decrease in ADL, chronic pain syndrome, chronic anxiety disorder benzodiazepine dependent ([facility] provided to chart).  Patient reports that she was just discharged 2 days ago from [facility] (from the ER?) with patient with same presentation with patient receiving systemic steroids and was discharged.   Patient notes a globus sensation and is afraid of choking with these episodes.  The patient is concerned when examiner corroborated the patient's history and medication list with the patient's spouse that her spouse would ascribe everything to anxiety.  Patient denies chest pain/pressure at present.  No dyspnea.  No throat pain.  No fever, no chills, no rigors.  No abdominal pain, no red blood per rectum or melena.  No back pain, no tearing back pain.  Increased urinary frequency.  Patient is requesting all medications to be IV--including patient's benzodiazepine.  No suicidal or homicidal ideation.  No anhedonia, but patient anxious.  Remaining review of systems not contributory.

## 2017-05-15 NOTE — H&P ADULT. - GASTROINTESTINAL DETAILS
bowel sounds normal/no rebound tenderness/soft/no distention/no bruit/nontender/no rigidity/no masses palpable

## 2017-05-15 NOTE — PROVIDER CONTACT NOTE (MEDICATION) - ASSESSMENT
Pt refuses PO intake concerned for difficulty swallow- pt awaiting speech and swallow. Pt refuses PO lipitor, ativan PO, and remeron PO tonight, and in AM 5/15 amlodipine PO, Atenolol PO, Synthroid PO, neurontin PO, and Ativan PO, pt requesting IV tylenol now for pain in knees and shoulders 7/10 pt states r/t to hx of laminectomy

## 2017-05-15 NOTE — PROVIDER CONTACT NOTE (OTHER) - ASSESSMENT
Patient vitals stable, is very anxious about swallowing pills or crushed pills with apple sauce. Patient states she wants to have speech and swallow come before taking anything by mouth.

## 2017-05-15 NOTE — H&P ADULT. - CONSTITUTIONAL COMMENTS
Elderly, obese, non-toxic F with mild generalized anxiety.  AxOx3. Speech fluent and Cognition intact.

## 2017-05-15 NOTE — H&P ADULT. - RS GEN PE MLT RESP DETAILS PC
breath sounds equal/no intercostal retractions/no rhonchi/good air movement/no rales/airway patent/no chest wall tenderness/clear to auscultation bilaterally/respirations non-labored

## 2017-05-15 NOTE — EEG REPORT - NS EEG TEXT BOX
5/15/2017    Hx: Concern for Seizures    FINDINGS:  The background was continuous, spontaneously variable and reactive.  During wakefulness, the posteriorly dominant rhythm consisted of symmetric, well modulated 8 Hz activity, with an amplitude to 40 uV, that attenuated to eye opening.  Low amplitude central beta was noted in wakefulness.    Sleep Background:  Drowsiness was characterized by fragmentation, attenuation, and slowing of the background activity.    Segments of stage II sleep were not recorded.    Background Slowing:  Generalized background slowing: Intermittent theta activity   Focal slowing: none was present.    Other Paroxysmal Non-Epileptiform Findings:    None.    Epileptiform Activity:   No epileptiform discharges were present.    Events:  No clinical events were recorded.  No seizures were recorded.    Activation Procedures:   -Hyperventilation was not performed.    -Photic stimulation was not performed.    Artifacts:  Intermittent myogenic and movement artifacts were noted.    ECG:  The heart rate on single channel ECG was predominantly between 70-80 BPM.    Compressed Spectral Array Digital Analysis    FINDINGS:  Compressed Spectral Array (CSA) data was reviewed separately and correlated with the electroencephalographic findings detailed above.  CSA showed a variable spectral pattern.  Areas of increased power in particular were reviewed in detail, and compared with the raw EEG data.  Areas of abrupt increases in spectral power were reviewed to exclude seizures, and were determined to be artifactual in nature.    The relative ratio of the power of delta range frequencies and faster frequencies remained stable over the course of the study.  There was no definitive increase in the relative power in the delta frequency spectrum apparent in the left hemisphere versus the right hemisphere.      Compressed Spectral Array (Digital Analysis) Summary/ Impression:  No persistent hemispheric asymmetry.  Intermittent areas of increased power reviewed, without definite epileptiform activity associated on CSA.      EEG Classification:  Abnormal study  -	Mild diffuse slowing    Impression:  Findings indicate non-specific mild diffuse or multifocal cerebral dysfunction. There were no epileptiform abnormalities recorded

## 2017-05-15 NOTE — H&P ADULT. - ASSESSMENT
NIGHT HOSPITALIST:  Presentation of patient with chest pressure with fine bilateral tremors with globus sensation intermittently for 2 months.  Unclear to the prior work-up from the abnormal pharmacologic stress in 2015, but patient transferred to telemetry to exclude cardiac equivalent.  Serial enzymes, echo.  Complicated etiology but doubt ictal episode and doubt ACS or acute aortic pathology, but concerned about that patient does have prior epicardial vessel disease but complicated with benzodiazepine dependency and risk for withdrawal, albeit presently with no evidence of delirium.

## 2017-05-15 NOTE — H&P ADULT. - REASON FOR ADMISSION
2 months of intermittent chest pressure with globus sensation, with intermittent B/L UE hand tremors

## 2017-05-15 NOTE — H&P ADULT. - PROBLEM SELECTOR PLAN 2
Adequate air exchange and presently no need for systemic steroids but will provide Duoneb therapy and inhaled corticosteroids.

## 2017-05-15 NOTE — H&P ADULT. - LAB RESULTS AND INTERPRETATION
WBC 11.3.  Hgb 14.6.  Platelets of 191K  INR 1.1.  Random glucose of 158.  Cr 0.8.  troponin nil x 2.  U/A with > 50 WBC.

## 2017-05-16 LAB
ANION GAP SERPL CALC-SCNC: 14 MMOL/L — SIGNIFICANT CHANGE UP (ref 5–17)
BUN SERPL-MCNC: 8 MG/DL — SIGNIFICANT CHANGE UP (ref 7–23)
C DIFF GDH STL QL: NEGATIVE — SIGNIFICANT CHANGE UP
C DIFF GDH STL QL: SIGNIFICANT CHANGE UP
CALCIUM SERPL-MCNC: 10 MG/DL — SIGNIFICANT CHANGE UP (ref 8.4–10.5)
CHLORIDE SERPL-SCNC: 99 MMOL/L — SIGNIFICANT CHANGE UP (ref 96–108)
CO2 SERPL-SCNC: 26 MMOL/L — SIGNIFICANT CHANGE UP (ref 22–31)
CREAT SERPL-MCNC: 0.65 MG/DL — SIGNIFICANT CHANGE UP (ref 0.5–1.3)
GLUCOSE SERPL-MCNC: 90 MG/DL — SIGNIFICANT CHANGE UP (ref 70–99)
HCT VFR BLD CALC: 45 % — SIGNIFICANT CHANGE UP (ref 34.5–45)
HGB BLD-MCNC: 14.6 G/DL — SIGNIFICANT CHANGE UP (ref 11.5–15.5)
IGE SERPL-ACNC: 105 IU/ML — HIGH (ref 0–100)
MCHC RBC-ENTMCNC: 30.8 PG — SIGNIFICANT CHANGE UP (ref 27–34)
MCHC RBC-ENTMCNC: 32.4 GM/DL — SIGNIFICANT CHANGE UP (ref 32–36)
MCV RBC AUTO: 94.9 FL — SIGNIFICANT CHANGE UP (ref 80–100)
PLATELET # BLD AUTO: 177 K/UL — SIGNIFICANT CHANGE UP (ref 150–400)
POTASSIUM SERPL-MCNC: 3.5 MMOL/L — SIGNIFICANT CHANGE UP (ref 3.5–5.3)
POTASSIUM SERPL-SCNC: 3.5 MMOL/L — SIGNIFICANT CHANGE UP (ref 3.5–5.3)
RBC # BLD: 4.74 M/UL — SIGNIFICANT CHANGE UP (ref 3.8–5.2)
RBC # FLD: 14.6 % — HIGH (ref 10.3–14.5)
SODIUM SERPL-SCNC: 139 MMOL/L — SIGNIFICANT CHANGE UP (ref 135–145)
WBC # BLD: 9.6 K/UL — SIGNIFICANT CHANGE UP (ref 3.8–10.5)
WBC # FLD AUTO: 9.6 K/UL — SIGNIFICANT CHANGE UP (ref 3.8–10.5)

## 2017-05-16 PROCEDURE — 99232 SBSQ HOSP IP/OBS MODERATE 35: CPT

## 2017-05-16 PROCEDURE — 99291 CRITICAL CARE FIRST HOUR: CPT

## 2017-05-16 PROCEDURE — 99223 1ST HOSP IP/OBS HIGH 75: CPT

## 2017-05-16 RX ORDER — PANTOPRAZOLE SODIUM 20 MG/1
40 TABLET, DELAYED RELEASE ORAL
Qty: 0 | Refills: 0 | Status: DISCONTINUED | OUTPATIENT
Start: 2017-05-16 | End: 2017-05-18

## 2017-05-16 RX ORDER — LOPERAMIDE HCL 2 MG
2 TABLET ORAL
Qty: 0 | Refills: 0 | Status: DISCONTINUED | OUTPATIENT
Start: 2017-05-16 | End: 2017-05-19

## 2017-05-16 RX ORDER — LACTOBACILLUS ACIDOPHILUS 100MM CELL
1 CAPSULE ORAL
Qty: 0 | Refills: 0 | Status: DISCONTINUED | OUTPATIENT
Start: 2017-05-16 | End: 2017-05-19

## 2017-05-16 RX ORDER — MIRTAZAPINE 45 MG/1
7.5 TABLET, ORALLY DISINTEGRATING ORAL AT BEDTIME
Qty: 0 | Refills: 0 | Status: DISCONTINUED | OUTPATIENT
Start: 2017-05-16 | End: 2017-05-16

## 2017-05-16 RX ORDER — LORATADINE 10 MG/1
10 TABLET ORAL DAILY
Qty: 0 | Refills: 0 | Status: DISCONTINUED | OUTPATIENT
Start: 2017-05-16 | End: 2017-05-19

## 2017-05-16 RX ORDER — MIRTAZAPINE 45 MG/1
7.5 TABLET, ORALLY DISINTEGRATING ORAL AT BEDTIME
Qty: 0 | Refills: 0 | Status: DISCONTINUED | OUTPATIENT
Start: 2017-05-16 | End: 2017-05-17

## 2017-05-16 RX ADMIN — Medication 1 MILLIGRAM(S): at 18:28

## 2017-05-16 RX ADMIN — HEPARIN SODIUM 5000 UNIT(S): 5000 INJECTION INTRAVENOUS; SUBCUTANEOUS at 22:35

## 2017-05-16 RX ADMIN — Medication 3 MILLILITER(S): at 05:47

## 2017-05-16 RX ADMIN — Medication 3 MILLILITER(S): at 12:15

## 2017-05-16 RX ADMIN — Medication 3 MILLILITER(S): at 23:46

## 2017-05-16 RX ADMIN — Medication 1 MILLIGRAM(S): at 09:48

## 2017-05-16 RX ADMIN — HEPARIN SODIUM 5000 UNIT(S): 5000 INJECTION INTRAVENOUS; SUBCUTANEOUS at 05:48

## 2017-05-16 RX ADMIN — Medication 3 MILLILITER(S): at 18:30

## 2017-05-16 RX ADMIN — Medication 1 MILLIGRAM(S): at 14:19

## 2017-05-16 RX ADMIN — Medication 1 PUFF(S): at 18:30

## 2017-05-16 RX ADMIN — HEPARIN SODIUM 5000 UNIT(S): 5000 INJECTION INTRAVENOUS; SUBCUTANEOUS at 14:22

## 2017-05-16 RX ADMIN — CEFTRIAXONE 100 GRAM(S): 500 INJECTION, POWDER, FOR SOLUTION INTRAMUSCULAR; INTRAVENOUS at 23:46

## 2017-05-16 RX ADMIN — Medication 1 MILLIGRAM(S): at 22:35

## 2017-05-16 RX ADMIN — Medication 1 PUFF(S): at 05:50

## 2017-05-16 NOTE — SWALLOW BEDSIDE ASSESSMENT ADULT - ESOPHAGEAL PHASE
+c/o stasis midsternally   pt noted to self reposition to supine position following assessment; + cough. Educated re maintaining upright position post PO intake.

## 2017-05-16 NOTE — DIETITIAN INITIAL EVALUATION ADULT. - NS FNS REASON FOR WEIGHT CHANG
Pt states being wheel chair bound for 2 years and states food was her friend and she was consuming more before the last month PTA.

## 2017-05-16 NOTE — DIETITIAN INITIAL EVALUATION ADULT. - NS AS NUTRI INTERV MEALS SNACK
Pt awaiting swallow assessment- defer texture/consistency to SLP/MD within goals of care. If po diet is indicated, as medically feasible and tolerated advance diet to DASH/TLC, with texture/consistency per MD orders.

## 2017-05-16 NOTE — DIETITIAN INITIAL EVALUATION ADULT. - NUTRITION INTERVENTION
Meals and Snack/Medical Food Supplements Nutrition Education/Meals and Snack/Collaboration and Referral of Nutrition Care

## 2017-05-16 NOTE — SWALLOW BEDSIDE ASSESSMENT ADULT - SWALLOW EVAL: DIAGNOSIS
Patient presents with phagophobia, suspected pharyngeal dysphagia characterized by reduced hyolaryngeal excursion upon palpation and repeat swallows suggestive of pharyngeal retention with subjective c/o same, and c/o midsternal stasis as well as delayed cough following pt self-repositioning to supine position, possibly suggestive of esophageal component.

## 2017-05-16 NOTE — SWALLOW BEDSIDE ASSESSMENT ADULT - COMMENTS
Per Assessment p/w chest pressure with fine bilateral tremors with globus sensation intermittently for 2 months. Unclear to the prior work-up from the abnormal pharmacologic stress in 2015, but pt transferred to telemetry to exclude cardiac equivalent. Serial enzymes, echo.  Complicated etiology but doubt ictal episode and doubt ACS or acute aortic pathology, but concerned about that pt does have prior epicardial vessel disease but complicated with benzodiazepine dependency and risk for withdrawal, albeit presently with no evidence of delirium. HC: 5/15 MRI Brain: No acute hemorrhage infarct or hydrocephalus. rEEG: Findings indicate non-specific mild diffuse or multifocal cerebral dysfunction. There were no epileptiform abnormalities recorded. Per Med pt c/o anxiousness and chest tightness. Tremoring of UE noted, c/o chest tightness (pt states not new). Recent full cardiac w/u at [facility] (-). /c.o increasing diffuse HA, pressure. HA, chest tightness likely 2/2 anxiousness. IG IV acetaminophen, stat EKG, CXR pending, pt refusing Head CT. Seen by Neuro re dysphagia; pt reports allergic reaction this week causing difficulty swallowing and is afraid to swallow her medications. Rx EEG, MRI to r/o intracranial process. 5/16 RRT called for tremors. Exam unlikely sz. Ativan given not for sz but for acute anxiety. Pt on chronic benzo, please do not stop abruptly to prevent withdrawal. F/u w/ neuro, symptoms resolved w/ ativan. Per Med pt hemodynamically stable, A&Ox3, no tremor noted. Pt placed on ativan IV; refused PO meds. Seen by GI, being evaluated for dysphagia to solids/liquids that has been intermittent for weeks, associated w/ sensation of globus getting "stuck" in the esophagus- occurring at times of anxiety or "tightness in the chest." ? anxiety component to intermittent dysphagia. Rx clear liquid diet/aspiration precautions and swallow eval.     *contd below

## 2017-05-16 NOTE — DIETITIAN INITIAL EVALUATION ADULT. - FACTORS AFF FOOD INTAKE
awaiting swallow evaluation, pt reports tightness in chest that gets worse with eating/difficulty swallowing

## 2017-05-16 NOTE — DIETITIAN INITIAL EVALUATION ADULT. - SIGNS/SYMPTOMS
pt reports poor po intake 2/2 swallowing difficulty/chest tightness on clear liquid diet pt reports gaining wt in the last 2 years, BMI >40

## 2017-05-16 NOTE — DIETITIAN INITIAL EVALUATION ADULT. - NS FNS WEIGHT CHANGE REASON
unintentional/Pt unaware of current wt but states she has gained weight. Pt reports she was 160 pounds when she left rehab but was unable to provide time frame, noted per previous RD notes varied weights, 6/20/15: 208 pounds, 10/14/15: 193 pounds, also noted dosing wt of 178 pounds on 10/11/15. Pt states she has gained weight and estimates she weighs over 200 pounds, noted dosing bed scale wt of 240 pounds.

## 2017-05-16 NOTE — PROVIDER CONTACT NOTE (CHANGE IN STATUS NOTIFICATION) - ASSESSMENT
Pt distressed, spasms in BUE and rest of body tense, pt A&Ox0 Pt distressed, spasms in BUE and rest of body tense, pt A&Ox0, Pt verbalizes that needs help, but does not respond to commands /107, HR 93, RR20, SPO2 94% on RA,

## 2017-05-16 NOTE — SWALLOW BEDSIDE ASSESSMENT ADULT - SWALLOW EVAL: PATIENT/FAMILY GOALS STATEMENT
Patient reports h/o asthma and frequent "episodes" of chest tightness; symptoms typically resolve with nebulizer treatment and Ativan. Reports new tremors in past 2 months. Also states over past 1-2 months chest tightness no longer resolved with nebs and Ativan; reports fear of eating during episodes of chest tightness; PO intake did not precipitate feelings of tightness. Pt states she feels stasis midsternally; states "it feels like it's not going to pass." Upon further interview states PO occasionally "feels like it's going to the wrong pipe." Following visit to OSH with dx allergic reaction, pt states she went home, attempted to take prednisone which "was not going down right;" followed by cesario with same subjective feeling->came to [facility] ED. GERD was part of ddx for chest tightness, prescribed Protonix which pt took for "some time," however pt stated did not alleviate symptoms so pt stopped taking them. Endorses h/o NOEMY and noncompliance with nocturnal CPAP.

## 2017-05-16 NOTE — SWALLOW BEDSIDE ASSESSMENT ADULT - CONSISTENCIES ADMINISTERED
puree thin/puree thick/honey thick pt reports "I'm scared to try the [solid], I don't want to choke"

## 2017-05-16 NOTE — DIETITIAN INITIAL EVALUATION ADULT. - NS AS NUTRI INTERV MEDICAL AND FOOD SUPPLEMENTS
If po diet is indicated, consider providing Ensure Enlive x2/day (thickened to appropriate consistency).

## 2017-05-16 NOTE — DIETITIAN INITIAL EVALUATION ADULT. - OTHER INFO
Nutrition consult for nutrition support team. Pt reports continued poor po intake on a clear liquid diet. Pt reports difficulty swallowing but denies chewing difficulty, awaiting swallow assessment. No nausea or emesis. Pt with diarrhea, plan to rule out c.diff. No known food allergies.

## 2017-05-16 NOTE — PROVIDER CONTACT NOTE (CHANGE IN STATUS NOTIFICATION) - BACKGROUND
adm with tremors x2 months worsening over the past two weeks, history of anxiety, htn, breast cancer, liver disease, copd. Base line A&Ox4, MRIO head (-) adm with tremors x2 months worsening over the past two weeks, history of anxiety, htn, breast cancer, liver disease, copd. Base line A&Ox4, MRI head (-)

## 2017-05-16 NOTE — DIETITIAN INITIAL EVALUATION ADULT. - PERTINENT LABORATORY DATA
5/16: Available nutrition related labs WNL, noted 5/15 Vitamin B12 1738- noted pt is receiving supplementation- d/w NP, 5/14: Alk Phos 148, ALT 46

## 2017-05-16 NOTE — DIETITIAN INITIAL EVALUATION ADULT. - ENERGY NEEDS
Ht: 60“, Wt: 240.3 lbs- dosing bed scale, pt unable to get standing wt, wheel chair bound, BMI: 46.9 kg/m2, IBW: 100 lbs (+/-10%), %IBW: 240%  Pertinent Information: Pt presents with L sided headache and chest tightness for 6 weeks. Has been having upper extremity shaking that patient believes is seizures, has been present for months. Recently discharged for [facility] for reported allergic reaction with throat closure and difficulty swallowing. EKG and MRI brain negative. RRT called for tremors, neurology following and psych consulted. Pt awaiting speech and swallow eval.   2+ mau. foot edema, previous documented stage II buttocks pressure ulcers were discontinued.

## 2017-05-16 NOTE — DIETITIAN INITIAL EVALUATION ADULT. - ORAL INTAKE PTA
poor/Pt reports for the last month she has been consuming soft foods and smaller portions due to tightness in chest that increased with eating. Pt reports taking Vitamin B12, Vitamin D3 and folic acid.

## 2017-05-16 NOTE — SWALLOW BEDSIDE ASSESSMENT ADULT - SLP GENERAL OBSERVATIONS
Patient encountered supine in bed Patient encountered supine in bed, positioned upright for purpose of evaluation. Pt A&Ox3, able to make wants and needs known and follow directives for purpose of evaluation. +tremors noted in upper extremities, appear to increase in frequency as evaluation progressed. Pt endorses anxiousness. Fast rate of speech noted. +Intermittently tremulous vocal quality. Speech and language WNL in conversation.

## 2017-05-17 LAB
-  AMIKACIN: SIGNIFICANT CHANGE UP
-  AMPICILLIN/SULBACTAM: SIGNIFICANT CHANGE UP
-  AMPICILLIN: SIGNIFICANT CHANGE UP
-  AZTREONAM: SIGNIFICANT CHANGE UP
-  CEFAZOLIN: SIGNIFICANT CHANGE UP
-  CEFEPIME: SIGNIFICANT CHANGE UP
-  CEFOXITIN: SIGNIFICANT CHANGE UP
-  CEFTAZIDIME: SIGNIFICANT CHANGE UP
-  CEFTRIAXONE: SIGNIFICANT CHANGE UP
-  CIPROFLOXACIN: SIGNIFICANT CHANGE UP
-  ERTAPENEM: SIGNIFICANT CHANGE UP
-  GENTAMICIN: SIGNIFICANT CHANGE UP
-  IMIPENEM: SIGNIFICANT CHANGE UP
-  LEVOFLOXACIN: SIGNIFICANT CHANGE UP
-  MEROPENEM: SIGNIFICANT CHANGE UP
-  NITROFURANTOIN: SIGNIFICANT CHANGE UP
-  PIPERACILLIN/TAZOBACTAM: SIGNIFICANT CHANGE UP
-  TOBRAMYCIN: SIGNIFICANT CHANGE UP
-  TRIMETHOPRIM/SULFAMETHOXAZOLE: SIGNIFICANT CHANGE UP
ANA TITR SER: NEGATIVE — SIGNIFICANT CHANGE UP
ANION GAP SERPL CALC-SCNC: 18 MMOL/L — HIGH (ref 5–17)
BAKER'S YEAST IGA QN IA: 11.9 UNITS — SIGNIFICANT CHANGE UP
BAKER'S YEAST IGA QN IA: NEGATIVE — SIGNIFICANT CHANGE UP
BAKER'S YEAST IGG QN IA: 16.8 UNITS — SIGNIFICANT CHANGE UP
BAKER'S YEAST IGG QN IA: NEGATIVE — SIGNIFICANT CHANGE UP
BUN SERPL-MCNC: 8 MG/DL — SIGNIFICANT CHANGE UP (ref 7–23)
C3 SERPL-MCNC: 138 MG/DL — SIGNIFICANT CHANGE UP (ref 80–180)
C4 SERPL-MCNC: 37 MG/DL — SIGNIFICANT CHANGE UP (ref 10–45)
CALCIUM SERPL-MCNC: 9.9 MG/DL — SIGNIFICANT CHANGE UP (ref 8.4–10.5)
CHLORIDE SERPL-SCNC: 101 MMOL/L — SIGNIFICANT CHANGE UP (ref 96–108)
CO2 SERPL-SCNC: 23 MMOL/L — SIGNIFICANT CHANGE UP (ref 22–31)
CREAT SERPL-MCNC: 0.83 MG/DL — SIGNIFICANT CHANGE UP (ref 0.5–1.3)
CULTURE RESULTS: SIGNIFICANT CHANGE UP
GLUCOSE SERPL-MCNC: 88 MG/DL — SIGNIFICANT CHANGE UP (ref 70–99)
HCT VFR BLD CALC: 43.8 % — SIGNIFICANT CHANGE UP (ref 34.5–45)
HGB BLD-MCNC: 14.4 G/DL — SIGNIFICANT CHANGE UP (ref 11.5–15.5)
IGA FLD-MCNC: 459 MG/DL — HIGH (ref 68–378)
IGG FLD-MCNC: 1230 MG/DL — SIGNIFICANT CHANGE UP (ref 694–1618)
IGM SERPL-MCNC: 118 MG/DL — SIGNIFICANT CHANGE UP (ref 40–230)
KAPPA LC SER QL IFE: 2.36 MG/DL — HIGH (ref 0.33–1.94)
KAPPA/LAMBDA FREE LIGHT CHAIN RATIO, SERUM: 1.04 RATIO — SIGNIFICANT CHANGE UP (ref 0.26–1.65)
LAMBDA LC SER QL IFE: 2.26 MG/DL — SIGNIFICANT CHANGE UP (ref 0.57–2.63)
MCHC RBC-ENTMCNC: 31 PG — SIGNIFICANT CHANGE UP (ref 27–34)
MCHC RBC-ENTMCNC: 32.9 GM/DL — SIGNIFICANT CHANGE UP (ref 32–36)
MCV RBC AUTO: 94.4 FL — SIGNIFICANT CHANGE UP (ref 80–100)
METHOD TYPE: SIGNIFICANT CHANGE UP
ORGANISM # SPEC MICROSCOPIC CNT: SIGNIFICANT CHANGE UP
ORGANISM # SPEC MICROSCOPIC CNT: SIGNIFICANT CHANGE UP
PLATELET # BLD AUTO: 156 K/UL — SIGNIFICANT CHANGE UP (ref 150–400)
POTASSIUM SERPL-MCNC: 3.2 MMOL/L — LOW (ref 3.5–5.3)
POTASSIUM SERPL-SCNC: 3.2 MMOL/L — LOW (ref 3.5–5.3)
RBC # BLD: 4.64 M/UL — SIGNIFICANT CHANGE UP (ref 3.8–5.2)
RBC # FLD: 14.6 % — HIGH (ref 10.3–14.5)
SODIUM SERPL-SCNC: 142 MMOL/L — SIGNIFICANT CHANGE UP (ref 135–145)
SPECIMEN SOURCE: SIGNIFICANT CHANGE UP
WBC # BLD: 9.08 K/UL — SIGNIFICANT CHANGE UP (ref 3.8–10.5)
WBC # FLD AUTO: 9.08 K/UL — SIGNIFICANT CHANGE UP (ref 3.8–10.5)

## 2017-05-17 PROCEDURE — 99233 SBSQ HOSP IP/OBS HIGH 50: CPT

## 2017-05-17 PROCEDURE — 74230 X-RAY XM SWLNG FUNCJ C+: CPT | Mod: 26

## 2017-05-17 RX ORDER — POTASSIUM CHLORIDE 20 MEQ
40 PACKET (EA) ORAL EVERY 4 HOURS
Qty: 0 | Refills: 0 | Status: DISCONTINUED | OUTPATIENT
Start: 2017-05-17 | End: 2017-05-17

## 2017-05-17 RX ORDER — RISPERIDONE 4 MG/1
1 TABLET ORAL AT BEDTIME
Qty: 0 | Refills: 0 | Status: DISCONTINUED | OUTPATIENT
Start: 2017-05-17 | End: 2017-05-19

## 2017-05-17 RX ORDER — MIRTAZAPINE 45 MG/1
15 TABLET, ORALLY DISINTEGRATING ORAL AT BEDTIME
Qty: 0 | Refills: 0 | Status: DISCONTINUED | OUTPATIENT
Start: 2017-05-17 | End: 2017-05-19

## 2017-05-17 RX ORDER — POTASSIUM CHLORIDE 20 MEQ
10 PACKET (EA) ORAL
Qty: 0 | Refills: 0 | Status: COMPLETED | OUTPATIENT
Start: 2017-05-17 | End: 2017-05-17

## 2017-05-17 RX ADMIN — MIRTAZAPINE 15 MILLIGRAM(S): 45 TABLET, ORALLY DISINTEGRATING ORAL at 22:03

## 2017-05-17 RX ADMIN — Medication 1 MILLIGRAM(S): at 04:42

## 2017-05-17 RX ADMIN — Medication 1 MILLIGRAM(S): at 18:41

## 2017-05-17 RX ADMIN — HEPARIN SODIUM 5000 UNIT(S): 5000 INJECTION INTRAVENOUS; SUBCUTANEOUS at 05:39

## 2017-05-17 RX ADMIN — OXYCODONE HYDROCHLORIDE 5 MILLIGRAM(S): 5 TABLET ORAL at 21:30

## 2017-05-17 RX ADMIN — HEPARIN SODIUM 5000 UNIT(S): 5000 INJECTION INTRAVENOUS; SUBCUTANEOUS at 22:04

## 2017-05-17 RX ADMIN — Medication 3 MILLILITER(S): at 18:43

## 2017-05-17 RX ADMIN — Medication 100 MILLIEQUIVALENT(S): at 15:00

## 2017-05-17 RX ADMIN — Medication 1 MILLIGRAM(S): at 08:43

## 2017-05-17 RX ADMIN — Medication 1 PUFF(S): at 18:44

## 2017-05-17 RX ADMIN — OXYCODONE HYDROCHLORIDE 5 MILLIGRAM(S): 5 TABLET ORAL at 13:15

## 2017-05-17 RX ADMIN — Medication 1 PUFF(S): at 05:39

## 2017-05-17 RX ADMIN — Medication 3 MILLILITER(S): at 13:16

## 2017-05-17 RX ADMIN — RISPERIDONE 2 MILLIGRAM(S): 4 TABLET ORAL at 13:21

## 2017-05-17 RX ADMIN — Medication 1 MILLIGRAM(S): at 13:18

## 2017-05-17 RX ADMIN — HEPARIN SODIUM 5000 UNIT(S): 5000 INJECTION INTRAVENOUS; SUBCUTANEOUS at 15:42

## 2017-05-17 RX ADMIN — Medication 3 MILLILITER(S): at 05:39

## 2017-05-17 RX ADMIN — OXYCODONE HYDROCHLORIDE 5 MILLIGRAM(S): 5 TABLET ORAL at 14:15

## 2017-05-17 RX ADMIN — RISPERIDONE 1 MILLIGRAM(S): 4 TABLET ORAL at 22:03

## 2017-05-17 RX ADMIN — Medication 100 MILLIEQUIVALENT(S): at 18:21

## 2017-05-17 RX ADMIN — Medication 100 MILLIEQUIVALENT(S): at 17:00

## 2017-05-17 RX ADMIN — Medication 2 MILLIGRAM(S): at 13:15

## 2017-05-17 RX ADMIN — OXYCODONE HYDROCHLORIDE 5 MILLIGRAM(S): 5 TABLET ORAL at 20:27

## 2017-05-17 NOTE — SWALLOW VFSS/MBS ASSESSMENT ADULT - NS SWALLOW VFSS REC ASPIR MON
pneumonia/upper respiratory infection/change of breathing pattern/cough/throat clearing/fever/gurgly voice/*If evident, report to MD immediately and d/c oral diet.

## 2017-05-17 NOTE — SWALLOW VFSS/MBS ASSESSMENT ADULT - ADDITIONAL INFORMATION
Hx contd: Per Neuro, per staff 20-30 mins body stiffening, + tremor. Pt cites fear of dying. MRI and EEG (-). Pt refuses to believe "episodes" can be associated w/ anxiety. Overnight event does not suggest seizure, although cannot completely r/o. While outpt pt was on ativan for many years for anxiety. Cannot r/o withdrawal seizure. Ddx of tremors: enhanced physiologic vs ET. Given h/o COPD BBlocker may not be indicated. Pt not taking anxiety medication as she cannot swallow. Suggest psych for possible options. Per Med speech and swallow eval pending. Seen by RD, Pt reports for the last month she has been consuming soft foods (such as bananas, rice pudding, yogurt, matza ball soup and pt started drinking Ensure x2/day recently PTA) and smaller portions due to tightness in chest that increased with eating.  Patient reports h/o asthma and frequent "episodes" of chest tightness; symptoms typically resolve with nebulizer treatment and Ativan. Reports new tremors in past 2 months. Also states over past 1-2 months chest tightness no longer resolved with nebs and Ativan; reports fear of eating during episodes of chest tightness; PO intake did not precipitate feelings of tightness. Pt states she feels stasis midsternally; states "it feels like it's not going to pass." Upon further interview states PO occasionally "feels like it's going to the wrong pipe." Following visit to OSH with dx allergic reaction, pt states she went home, attempted to take prednisone which "was not going down right;" followed by cesario with same subjective feeling->came to [facility] ED. GERD was part of ddx for chest tightness, prescribed Protonix which pt took for "some time," however pt stated did not alleviate symptoms so pt stopped taking them. Endorses h/o NOEMY and noncompliance with nocturnal CPAP.

## 2017-05-17 NOTE — SWALLOW VFSS/MBS ASSESSMENT ADULT - SLP PERTINENT HISTORY OF CURRENT PROBLEM
67 yo F h/o localized breast CA w/ past tylectomy presumed to be in remission since 1997, abnormal pharmacologic cardiac stress in 11/2015, hypernephroma S/P partial nephrectomy presumed to be disease free, Grave's disease, COPD w pt quit tobacco in 1997 (1 ppd x 25 yrs cigarettes), complicated course following 2 laminectomies with progressive fx decline w/ transient decrease in ADL, chronic pain syndrome, chronic anxiety disorder benzodiazepine dependent. Pt reports that she was just discharged 2 days ago from [facility] (from the ER?) with pt with same presentation with pt receiving systemic steroids and was discharged. Pt notes a globus sensation and is afraid of choking with these episodes. Pt is concerned when examiner corroborated the pt's hx and med list w/ pt's spouse that her spouse would ascribe everything to anxiety. Pt is requesting all medications to be IV--including benzodiazepine. No suicidal or homicidal ideation. No anhedonia, but pt anxious.

## 2017-05-17 NOTE — SWALLOW VFSS/MBS ASSESSMENT ADULT - BASELINE SWALLOW
Per d/w radiologist; Presence of surgical hardware (area of C2-C7) with post surgical changes with possible prominent cricopharyngeus vs other..  **During MBS: Pt continuously reported sensation of residue in the mid thoracic region; minimal pharyngeal residue noted and given limited view of esophagus in lateral position esophageal residue was not visualized with thin liquid and solid trial.

## 2017-05-17 NOTE — SWALLOW VFSS/MBS ASSESSMENT ADULT - COMMENTS
Per Assessment p/w chest pressure with fine bilateral tremors with globus sensation intermittently for 2 months. Unclear to the prior work-up from the abnormal pharmacologic stress in 2015, but pt transferred to telemetry to exclude cardiac equivalent. Serial enzymes, echo.  Complicated etiology but doubt ictal episode and doubt ACS or acute aortic pathology, but concerned about that pt does have prior epicardial vessel disease but complicated with benzodiazepine dependency and risk for withdrawal, albeit presently with no evidence of delirium. HC: 5/15 MRI Brain: No acute hemorrhage infarct or hydrocephalus. rEEG: Findings indicate non-specific mild diffuse or multifocal cerebral dysfunction. There were no epileptiform abnormalities recorded. Per Med pt c/o anxiousness and chest tightness. Tremoring of UE noted, c/o chest tightness (pt states not new). Recent full cardiac w/u at [facility] (-). /c.o increasing diffuse HA, pressure. HA, chest tightness likely 2/2 anxiousness. IG IV acetaminophen, stat EKG, CXR pending, pt refusing Head CT. Seen by Neuro re dysphagia; pt reports allergic reaction this week causing difficulty swallowing and is afraid to swallow her medications. Rx EEG, MRI to r/o intracranial process. 5/16 RRT called for tremors. Exam unlikely sz. Ativan given not for sz but for acute anxiety. Pt on chronic benzo, please do not stop abruptly to prevent withdrawal. F/u w/ neuro, symptoms resolved w/ ativan. Per Med pt hemodynamically stable, A&Ox3, no tremor noted. Pt placed on ativan IV; refused PO meds. Seen by GI, being evaluated for dysphagia to solids/liquids that has been intermittent for weeks, associated w/ sensation of globus getting "stuck" in the esophagus- occurring at times of anxiety or "tightness in the chest." ? anxiety component to intermittent dysphagia. Rx clear liquid diet/aspiration precautions and swallow eval.

## 2017-05-17 NOTE — SWALLOW VFSS/MBS ASSESSMENT ADULT - DIAGNOSTIC IMPRESSIONS
Pt presents with mild oropharyngeal dysphagia however overall skills deemed to be adequate for regular texture diet.  Although reduced lingual strength/ROM/Rate of motion, reduced hyo-laryngeal excursion, reduced laryngeal closure, and reduced epiglottic retroflexion were evident, aspiration was not evident with any consistency presented.  Laryngeal penetration was evident on initial trial of thin puree and thin liquid trials although penetrated material appeared to be completely retrieved.  Formation, control and transfer of the bolus was adequate.  Of note, Pharyngeal clearing judged to be adequate however Pt continuously reported sensation of residue in the mid thoracic region.

## 2017-05-17 NOTE — SWALLOW VFSS/MBS ASSESSMENT ADULT - ORAL PHASE
Uncontrolled bolus / spillover in toshia-pharynx/within functional limits/lingual pumping with trace to mild oral residue cleared with spontaneous repeat swallow within functional limits/lingual pumping with oral residue cleared with spontaneous repeat swallow Uncontrolled bolus / spillover in toshia-pharynx/trace oral residue/within functional limits trace oral residue/Within functional limits/Reduced anterior - posterior transport within functional limits/spillover to AEF; lingual pumping with a-p transfer

## 2017-05-18 ENCOUNTER — RESULT REVIEW (OUTPATIENT)
Age: 67
End: 2017-05-18

## 2017-05-18 LAB
ANION GAP SERPL CALC-SCNC: 18 MMOL/L — HIGH (ref 5–17)
AUTO DIFF PNL BLD: NEGATIVE — SIGNIFICANT CHANGE UP
BUN SERPL-MCNC: 7 MG/DL — SIGNIFICANT CHANGE UP (ref 7–23)
C-ANCA SER-ACNC: NEGATIVE — SIGNIFICANT CHANGE UP
CALCIUM SERPL-MCNC: 9.3 MG/DL — SIGNIFICANT CHANGE UP (ref 8.4–10.5)
CHLORIDE SERPL-SCNC: 98 MMOL/L — SIGNIFICANT CHANGE UP (ref 96–108)
CO2 SERPL-SCNC: 23 MMOL/L — SIGNIFICANT CHANGE UP (ref 22–31)
CREAT SERPL-MCNC: 0.7 MG/DL — SIGNIFICANT CHANGE UP (ref 0.5–1.3)
GLUCOSE SERPL-MCNC: 96 MG/DL — SIGNIFICANT CHANGE UP (ref 70–99)
HCT VFR BLD CALC: 43.6 % — SIGNIFICANT CHANGE UP (ref 34.5–45)
HGB BLD-MCNC: 14.5 G/DL — SIGNIFICANT CHANGE UP (ref 11.5–15.5)
HISTAMINE BLD-MCNC: 0.6 NG/ML — SIGNIFICANT CHANGE UP (ref 0–1)
MAGNESIUM SERPL-MCNC: 1.8 MG/DL — SIGNIFICANT CHANGE UP (ref 1.6–2.6)
MCHC RBC-ENTMCNC: 30.9 PG — SIGNIFICANT CHANGE UP (ref 27–34)
MCHC RBC-ENTMCNC: 33.3 GM/DL — SIGNIFICANT CHANGE UP (ref 32–36)
MCV RBC AUTO: 92.8 FL — SIGNIFICANT CHANGE UP (ref 80–100)
P-ANCA SER-ACNC: NEGATIVE — SIGNIFICANT CHANGE UP
PLATELET # BLD AUTO: 165 K/UL — SIGNIFICANT CHANGE UP (ref 150–400)
POTASSIUM SERPL-MCNC: 3.2 MMOL/L — LOW (ref 3.5–5.3)
POTASSIUM SERPL-SCNC: 3.2 MMOL/L — LOW (ref 3.5–5.3)
RBC # BLD: 4.7 M/UL — SIGNIFICANT CHANGE UP (ref 3.8–5.2)
RBC # FLD: 14.5 % — SIGNIFICANT CHANGE UP (ref 10.3–14.5)
SODIUM SERPL-SCNC: 139 MMOL/L — SIGNIFICANT CHANGE UP (ref 135–145)
T3 SERPL-MCNC: 63 NG/DL — LOW (ref 80–200)
T4 AB SER-ACNC: 7.8 UG/DL — SIGNIFICANT CHANGE UP (ref 4.6–12)
TSH SERPL-MCNC: 3.84 UIU/ML — SIGNIFICANT CHANGE UP (ref 0.27–4.2)
WBC # BLD: 9.13 K/UL — SIGNIFICANT CHANGE UP (ref 3.8–10.5)
WBC # FLD AUTO: 9.13 K/UL — SIGNIFICANT CHANGE UP (ref 3.8–10.5)

## 2017-05-18 PROCEDURE — 88305 TISSUE EXAM BY PATHOLOGIST: CPT | Mod: 26

## 2017-05-18 PROCEDURE — 88312 SPECIAL STAINS GROUP 1: CPT | Mod: 26

## 2017-05-18 RX ORDER — SUCRALFATE 1 G
1 TABLET ORAL THREE TIMES A DAY
Qty: 0 | Refills: 0 | Status: DISCONTINUED | OUTPATIENT
Start: 2017-05-18 | End: 2017-05-19

## 2017-05-18 RX ORDER — POTASSIUM CHLORIDE 20 MEQ
10 PACKET (EA) ORAL ONCE
Qty: 0 | Refills: 0 | Status: COMPLETED | OUTPATIENT
Start: 2017-05-18 | End: 2017-05-18

## 2017-05-18 RX ORDER — PANTOPRAZOLE SODIUM 20 MG/1
40 TABLET, DELAYED RELEASE ORAL
Qty: 0 | Refills: 0 | Status: DISCONTINUED | OUTPATIENT
Start: 2017-05-18 | End: 2017-05-19

## 2017-05-18 RX ORDER — POTASSIUM CHLORIDE 20 MEQ
40 PACKET (EA) ORAL ONCE
Qty: 0 | Refills: 0 | Status: COMPLETED | OUTPATIENT
Start: 2017-05-18 | End: 2017-05-18

## 2017-05-18 RX ORDER — POTASSIUM CHLORIDE 20 MEQ
40 PACKET (EA) ORAL ONCE
Qty: 0 | Refills: 0 | Status: DISCONTINUED | OUTPATIENT
Start: 2017-05-18 | End: 2017-05-18

## 2017-05-18 RX ADMIN — Medication 1 MILLIGRAM(S): at 16:33

## 2017-05-18 RX ADMIN — CEFTRIAXONE 100 GRAM(S): 500 INJECTION, POWDER, FOR SOLUTION INTRAMUSCULAR; INTRAVENOUS at 00:01

## 2017-05-18 RX ADMIN — Medication 1 MILLIGRAM(S): at 06:04

## 2017-05-18 RX ADMIN — Medication 3 MILLILITER(S): at 16:33

## 2017-05-18 RX ADMIN — Medication 100 MILLIEQUIVALENT(S): at 20:25

## 2017-05-18 RX ADMIN — Medication 2000 UNIT(S): at 16:33

## 2017-05-18 RX ADMIN — HEPARIN SODIUM 5000 UNIT(S): 5000 INJECTION INTRAVENOUS; SUBCUTANEOUS at 06:04

## 2017-05-18 RX ADMIN — PANTOPRAZOLE SODIUM 40 MILLIGRAM(S): 20 TABLET, DELAYED RELEASE ORAL at 16:33

## 2017-05-18 RX ADMIN — AMLODIPINE BESYLATE 2.5 MILLIGRAM(S): 2.5 TABLET ORAL at 16:33

## 2017-05-18 RX ADMIN — RISPERIDONE 1 MILLIGRAM(S): 4 TABLET ORAL at 21:57

## 2017-05-18 RX ADMIN — PREGABALIN 100 MICROGRAM(S): 225 CAPSULE ORAL at 16:34

## 2017-05-18 RX ADMIN — Medication 2 MILLIGRAM(S): at 18:36

## 2017-05-18 RX ADMIN — Medication 3 MILLILITER(S): at 00:01

## 2017-05-18 RX ADMIN — Medication 1 MILLIGRAM(S): at 00:01

## 2017-05-18 RX ADMIN — Medication 1 MILLIGRAM(S): at 15:07

## 2017-05-18 RX ADMIN — HEPARIN SODIUM 5000 UNIT(S): 5000 INJECTION INTRAVENOUS; SUBCUTANEOUS at 21:58

## 2017-05-18 RX ADMIN — Medication 1 GRAM(S): at 21:57

## 2017-05-18 RX ADMIN — Medication 3 MILLILITER(S): at 06:08

## 2017-05-18 RX ADMIN — Medication 1 PUFF(S): at 06:08

## 2017-05-18 RX ADMIN — LORATADINE 10 MILLIGRAM(S): 10 TABLET ORAL at 15:31

## 2017-05-18 RX ADMIN — ATORVASTATIN CALCIUM 40 MILLIGRAM(S): 80 TABLET, FILM COATED ORAL at 21:57

## 2017-05-18 RX ADMIN — GABAPENTIN 400 MILLIGRAM(S): 400 CAPSULE ORAL at 21:57

## 2017-05-18 RX ADMIN — Medication 125 MICROGRAM(S): at 15:04

## 2017-05-18 RX ADMIN — Medication 81 MILLIGRAM(S): at 16:33

## 2017-05-18 RX ADMIN — DULOXETINE HYDROCHLORIDE 60 MILLIGRAM(S): 30 CAPSULE, DELAYED RELEASE ORAL at 16:33

## 2017-05-18 RX ADMIN — HEPARIN SODIUM 5000 UNIT(S): 5000 INJECTION INTRAVENOUS; SUBCUTANEOUS at 15:31

## 2017-05-18 RX ADMIN — GABAPENTIN 400 MILLIGRAM(S): 400 CAPSULE ORAL at 15:03

## 2017-05-18 RX ADMIN — ATENOLOL 50 MILLIGRAM(S): 25 TABLET ORAL at 15:04

## 2017-05-18 RX ADMIN — OXYCODONE HYDROCHLORIDE 5 MILLIGRAM(S): 5 TABLET ORAL at 10:22

## 2017-05-18 RX ADMIN — MIRTAZAPINE 15 MILLIGRAM(S): 45 TABLET, ORALLY DISINTEGRATING ORAL at 21:57

## 2017-05-18 RX ADMIN — Medication 3 MILLILITER(S): at 14:25

## 2017-05-18 RX ADMIN — OXYCODONE HYDROCHLORIDE 5 MILLIGRAM(S): 5 TABLET ORAL at 11:00

## 2017-05-18 RX ADMIN — Medication 1 MILLIGRAM(S): at 21:57

## 2017-05-18 RX ADMIN — Medication 1 PUFF(S): at 16:34

## 2017-05-18 NOTE — PROVIDER CONTACT NOTE (MEDICATION) - ACTION/TREATMENT ORDERED:
NP aware and states no interventions. OK for pt to have jello and applesauce only and to keep NPO after midnight. Np also states to reschedule AM meds for later in AM so pt can receive post endoscopy.
will order IV tylenol, notify ovider if sbp>160 will consider lopressor ivp

## 2017-05-18 NOTE — PROVIDER CONTACT NOTE (MEDICATION) - BACKGROUND
Pt admitted for difficulty swallowing. Hx of Severe anxiety, HTN. Pt has Xray MBS done 5/17 and showed laryngeal penetration w/ thin puree and thin liquids. Pt NPO after midnight for Endoscopy 5/18.

## 2017-05-18 NOTE — PROVIDER CONTACT NOTE (MEDICATION) - SITUATION
Pt refusing 10pm scheduled Lipitor. Pt educated on importance of medication and states she does not want to take any medication she has to swallow.

## 2017-05-19 ENCOUNTER — TRANSCRIPTION ENCOUNTER (OUTPATIENT)
Age: 67
End: 2017-05-19

## 2017-05-19 VITALS
HEART RATE: 73 BPM | OXYGEN SATURATION: 94 % | DIASTOLIC BLOOD PRESSURE: 73 MMHG | RESPIRATION RATE: 18 BRPM | SYSTOLIC BLOOD PRESSURE: 130 MMHG | TEMPERATURE: 98 F

## 2017-05-19 LAB
ANION GAP SERPL CALC-SCNC: 14 MMOL/L — SIGNIFICANT CHANGE UP (ref 5–17)
BUN SERPL-MCNC: 5 MG/DL — LOW (ref 7–23)
CALCIUM SERPL-MCNC: 9.4 MG/DL — SIGNIFICANT CHANGE UP (ref 8.4–10.5)
CHLORIDE SERPL-SCNC: 98 MMOL/L — SIGNIFICANT CHANGE UP (ref 96–108)
CO2 SERPL-SCNC: 24 MMOL/L — SIGNIFICANT CHANGE UP (ref 22–31)
CREAT SERPL-MCNC: 0.77 MG/DL — SIGNIFICANT CHANGE UP (ref 0.5–1.3)
GLUCOSE SERPL-MCNC: 138 MG/DL — HIGH (ref 70–99)
HCT VFR BLD CALC: 43.6 % — SIGNIFICANT CHANGE UP (ref 34.5–45)
HGB BLD-MCNC: 14.6 G/DL — SIGNIFICANT CHANGE UP (ref 11.5–15.5)
MCHC RBC-ENTMCNC: 31.6 PG — SIGNIFICANT CHANGE UP (ref 27–34)
MCHC RBC-ENTMCNC: 33.5 GM/DL — SIGNIFICANT CHANGE UP (ref 32–36)
MCV RBC AUTO: 94.4 FL — SIGNIFICANT CHANGE UP (ref 80–100)
PLATELET # BLD AUTO: 135 K/UL — LOW (ref 150–400)
POTASSIUM SERPL-MCNC: 3.3 MMOL/L — LOW (ref 3.5–5.3)
POTASSIUM SERPL-SCNC: 3.3 MMOL/L — LOW (ref 3.5–5.3)
RBC # BLD: 4.62 M/UL — SIGNIFICANT CHANGE UP (ref 3.8–5.2)
RBC # FLD: 14.7 % — HIGH (ref 10.3–14.5)
SODIUM SERPL-SCNC: 136 MMOL/L — SIGNIFICANT CHANGE UP (ref 135–145)
WBC # BLD: 9.51 K/UL — SIGNIFICANT CHANGE UP (ref 3.8–10.5)
WBC # FLD AUTO: 9.51 K/UL — SIGNIFICANT CHANGE UP (ref 3.8–10.5)

## 2017-05-19 PROCEDURE — 88305 TISSUE EXAM BY PATHOLOGIST: CPT

## 2017-05-19 PROCEDURE — 84443 ASSAY THYROID STIM HORMONE: CPT

## 2017-05-19 PROCEDURE — 82330 ASSAY OF CALCIUM: CPT

## 2017-05-19 PROCEDURE — 70551 MRI BRAIN STEM W/O DYE: CPT

## 2017-05-19 PROCEDURE — 84480 ASSAY TRIIODOTHYRONINE (T3): CPT

## 2017-05-19 PROCEDURE — 87086 URINE CULTURE/COLONY COUNT: CPT

## 2017-05-19 PROCEDURE — 84439 ASSAY OF FREE THYROXINE: CPT

## 2017-05-19 PROCEDURE — 83605 ASSAY OF LACTIC ACID: CPT

## 2017-05-19 PROCEDURE — 86160 COMPLEMENT ANTIGEN: CPT

## 2017-05-19 PROCEDURE — 84295 ASSAY OF SERUM SODIUM: CPT

## 2017-05-19 PROCEDURE — 82435 ASSAY OF BLOOD CHLORIDE: CPT

## 2017-05-19 PROCEDURE — 83735 ASSAY OF MAGNESIUM: CPT

## 2017-05-19 PROCEDURE — 80048 BASIC METABOLIC PNL TOTAL CA: CPT

## 2017-05-19 PROCEDURE — 84132 ASSAY OF SERUM POTASSIUM: CPT

## 2017-05-19 PROCEDURE — 85730 THROMBOPLASTIN TIME PARTIAL: CPT

## 2017-05-19 PROCEDURE — 92611 MOTION FLUOROSCOPY/SWALLOW: CPT

## 2017-05-19 PROCEDURE — 74230 X-RAY XM SWLNG FUNCJ C+: CPT

## 2017-05-19 PROCEDURE — 82550 ASSAY OF CK (CPK): CPT

## 2017-05-19 PROCEDURE — 92610 EVALUATE SWALLOWING FUNCTION: CPT

## 2017-05-19 PROCEDURE — 82947 ASSAY GLUCOSE BLOOD QUANT: CPT

## 2017-05-19 PROCEDURE — 94640 AIRWAY INHALATION TREATMENT: CPT

## 2017-05-19 PROCEDURE — 85027 COMPLETE CBC AUTOMATED: CPT

## 2017-05-19 PROCEDURE — 84436 ASSAY OF TOTAL THYROXINE: CPT

## 2017-05-19 PROCEDURE — 93005 ELECTROCARDIOGRAM TRACING: CPT

## 2017-05-19 PROCEDURE — 85014 HEMATOCRIT: CPT

## 2017-05-19 PROCEDURE — 95957 EEG DIGITAL ANALYSIS: CPT

## 2017-05-19 PROCEDURE — 87324 CLOSTRIDIUM AG IA: CPT

## 2017-05-19 PROCEDURE — 99285 EMERGENCY DEPT VISIT HI MDM: CPT | Mod: 25

## 2017-05-19 PROCEDURE — 86036 ANCA SCREEN EACH ANTIBODY: CPT

## 2017-05-19 PROCEDURE — 84484 ASSAY OF TROPONIN QUANT: CPT

## 2017-05-19 PROCEDURE — 82607 VITAMIN B-12: CPT

## 2017-05-19 PROCEDURE — 95819 EEG AWAKE AND ASLEEP: CPT

## 2017-05-19 PROCEDURE — 83088 ASSAY OF HISTAMINE: CPT

## 2017-05-19 PROCEDURE — 86780 TREPONEMA PALLIDUM: CPT

## 2017-05-19 PROCEDURE — 93010 ELECTROCARDIOGRAM REPORT: CPT

## 2017-05-19 PROCEDURE — 97161 PT EVAL LOW COMPLEX 20 MIN: CPT

## 2017-05-19 PROCEDURE — 82553 CREATINE MB FRACTION: CPT

## 2017-05-19 PROCEDURE — 82784 ASSAY IGA/IGD/IGG/IGM EACH: CPT

## 2017-05-19 PROCEDURE — 82785 ASSAY OF IGE: CPT

## 2017-05-19 PROCEDURE — 87186 SC STD MICRODIL/AGAR DIL: CPT

## 2017-05-19 PROCEDURE — 88312 SPECIAL STAINS GROUP 1: CPT

## 2017-05-19 PROCEDURE — 99233 SBSQ HOSP IP/OBS HIGH 50: CPT

## 2017-05-19 PROCEDURE — 82746 ASSAY OF FOLIC ACID SERUM: CPT

## 2017-05-19 PROCEDURE — 80053 COMPREHEN METABOLIC PANEL: CPT

## 2017-05-19 PROCEDURE — 86038 ANTINUCLEAR ANTIBODIES: CPT

## 2017-05-19 PROCEDURE — 81001 URINALYSIS AUTO W/SCOPE: CPT

## 2017-05-19 PROCEDURE — 85610 PROTHROMBIN TIME: CPT

## 2017-05-19 PROCEDURE — 83520 IMMUNOASSAY QUANT NOS NONAB: CPT

## 2017-05-19 PROCEDURE — 82803 BLOOD GASES ANY COMBINATION: CPT

## 2017-05-19 RX ORDER — SUCRALFATE 1 G
1 TABLET ORAL
Qty: 0 | Refills: 0 | DISCHARGE
Start: 2017-05-19

## 2017-05-19 RX ORDER — LACTOBACILLUS ACIDOPHILUS 100MM CELL
1 CAPSULE ORAL
Qty: 0 | Refills: 0 | DISCHARGE
Start: 2017-05-19

## 2017-05-19 RX ORDER — OXYCODONE HYDROCHLORIDE 5 MG/1
1 TABLET ORAL
Qty: 0 | Refills: 0 | COMMUNITY
Start: 2017-05-19

## 2017-05-19 RX ORDER — POTASSIUM CHLORIDE 20 MEQ
40 PACKET (EA) ORAL ONCE
Qty: 0 | Refills: 0 | Status: COMPLETED | OUTPATIENT
Start: 2017-05-19 | End: 2017-05-19

## 2017-05-19 RX ORDER — PANTOPRAZOLE SODIUM 20 MG/1
1 TABLET, DELAYED RELEASE ORAL
Qty: 0 | Refills: 0 | DISCHARGE
Start: 2017-05-19

## 2017-05-19 RX ORDER — LOPERAMIDE HCL 2 MG
1 TABLET ORAL
Qty: 0 | Refills: 0 | DISCHARGE
Start: 2017-05-19

## 2017-05-19 RX ORDER — LORATADINE 10 MG/1
1 TABLET ORAL
Qty: 0 | Refills: 0 | DISCHARGE
Start: 2017-05-19

## 2017-05-19 RX ORDER — OXYCODONE HYDROCHLORIDE 5 MG/1
1 TABLET ORAL
Qty: 0 | Refills: 0 | COMMUNITY

## 2017-05-19 RX ADMIN — Medication 125 MICROGRAM(S): at 06:23

## 2017-05-19 RX ADMIN — Medication 3 MILLILITER(S): at 00:11

## 2017-05-19 RX ADMIN — Medication 1 MILLIGRAM(S): at 13:15

## 2017-05-19 RX ADMIN — PANTOPRAZOLE SODIUM 40 MILLIGRAM(S): 20 TABLET, DELAYED RELEASE ORAL at 06:23

## 2017-05-19 RX ADMIN — Medication 1 GRAM(S): at 13:15

## 2017-05-19 RX ADMIN — PREGABALIN 100 MICROGRAM(S): 225 CAPSULE ORAL at 13:15

## 2017-05-19 RX ADMIN — Medication 3 MILLILITER(S): at 06:23

## 2017-05-19 RX ADMIN — Medication 1 TABLET(S): at 09:57

## 2017-05-19 RX ADMIN — Medication 1 MILLIGRAM(S): at 10:01

## 2017-05-19 RX ADMIN — Medication 81 MILLIGRAM(S): at 13:16

## 2017-05-19 RX ADMIN — HEPARIN SODIUM 5000 UNIT(S): 5000 INJECTION INTRAVENOUS; SUBCUTANEOUS at 13:16

## 2017-05-19 RX ADMIN — GABAPENTIN 400 MILLIGRAM(S): 400 CAPSULE ORAL at 06:23

## 2017-05-19 RX ADMIN — HEPARIN SODIUM 5000 UNIT(S): 5000 INJECTION INTRAVENOUS; SUBCUTANEOUS at 06:23

## 2017-05-19 RX ADMIN — Medication 1 GRAM(S): at 06:23

## 2017-05-19 RX ADMIN — AMLODIPINE BESYLATE 2.5 MILLIGRAM(S): 2.5 TABLET ORAL at 06:23

## 2017-05-19 RX ADMIN — LORATADINE 10 MILLIGRAM(S): 10 TABLET ORAL at 13:17

## 2017-05-19 RX ADMIN — Medication 2000 UNIT(S): at 13:15

## 2017-05-19 RX ADMIN — Medication 3 MILLILITER(S): at 13:14

## 2017-05-19 RX ADMIN — Medication 1 PUFF(S): at 06:23

## 2017-05-19 RX ADMIN — ATENOLOL 50 MILLIGRAM(S): 25 TABLET ORAL at 06:23

## 2017-05-19 RX ADMIN — DULOXETINE HYDROCHLORIDE 60 MILLIGRAM(S): 30 CAPSULE, DELAYED RELEASE ORAL at 13:16

## 2017-05-19 RX ADMIN — Medication 2 MILLIGRAM(S): at 06:31

## 2017-05-19 RX ADMIN — Medication 1 TABLET(S): at 13:14

## 2017-05-19 RX ADMIN — Medication 2 MILLIGRAM(S): at 13:15

## 2017-05-19 RX ADMIN — Medication 40 MILLIEQUIVALENT(S): at 13:14

## 2017-05-19 NOTE — DISCHARGE NOTE ADULT - CARE PLAN
Principal Discharge DX:	Anxiety disorder  Goal:	Remain with decreased/improved anxiety  Instructions for follow-up, activity and diet:	Transfer to in patient psych for further treatment  Secondary Diagnosis:	Chest pressure  Instructions for follow-up, activity and diet:	HOME CARE INSTRUCTIONS  For the next few days, avoid physical activities that bring on chest pain. Continue physical activities as directed.  Do not smoke.  Avoid drinking alcohol.   Only take over-the-counter or prescription medicine for pain, discomfort, or fever as directed by your caregiver.  Follow your caregiver's suggestions for further testing if your chest pain does not go away.  Keep any follow-up appointments you made. If you do not go to an appointment, you could develop lasting (chronic) problems with pain. If there is any problem keeping an appointment, you must call to reschedule.   SEEK MEDICAL CARE IF:  You think you are having problems from the medicine you are taking. Read your medicine instructions carefully.  Your chest pain does not go away, even after treatment.  You develop a rash with blisters on your chest.  SEEK IMMEDIATE MEDICAL CARE IF:  You have increased chest pain or pain that spreads to your arm, neck, jaw, back, or abdomen.   You develop shortness of breath, an increasing cough, or you are coughing up blood.  You have severe back or abdominal pain, feel nauseous, or vomit.  You develop severe weakness, fainting, or chills.  You have a fever.  THIS IS AN EMERGENCY. Do not wait to see if the pain will go away. Get medical help at once. Call your local emergency services. Do not drive yourself to the hospital.  Secondary Diagnosis:	Gastritis  Instructions for follow-up, activity and diet:	Call your healthcare provider:  If you have belly pain that gets worse or doesn't go away  If you vomit blood or have black bowel movements  If you are losing weight (without trying)  Avoid NSAIDs- (Aspirin, Ibuprofen, Advil, Motrin, Naproxen, Aleve)  Avoid alcoholic beverages  Take all medications as prescribed.  Call your healthcare provider for a follow-up appointment within one week

## 2017-05-19 NOTE — DISCHARGE NOTE ADULT - PATIENT PORTAL LINK FT
“You can access the Follow[employer] Patient Portal, offered by [facility], by registering with the following website: [email or web address]”

## 2017-05-19 NOTE — DISCHARGE NOTE ADULT - PLAN OF CARE
Remain with decreased/improved anxiety Transfer to in patient psych for further treatment HOME CARE INSTRUCTIONS  For the next few days, avoid physical activities that bring on chest pain. Continue physical activities as directed.  Do not smoke.  Avoid drinking alcohol.   Only take over-the-counter or prescription medicine for pain, discomfort, or fever as directed by your caregiver.  Follow your caregiver's suggestions for further testing if your chest pain does not go away.  Keep any follow-up appointments you made. If you do not go to an appointment, you could develop lasting (chronic) problems with pain. If there is any problem keeping an appointment, you must call to reschedule.   SEEK MEDICAL CARE IF:  You think you are having problems from the medicine you are taking. Read your medicine instructions carefully.  Your chest pain does not go away, even after treatment.  You develop a rash with blisters on your chest.  SEEK IMMEDIATE MEDICAL CARE IF:  You have increased chest pain or pain that spreads to your arm, neck, jaw, back, or abdomen.   You develop shortness of breath, an increasing cough, or you are coughing up blood.  You have severe back or abdominal pain, feel nauseous, or vomit.  You develop severe weakness, fainting, or chills.  You have a fever.  THIS IS AN EMERGENCY. Do not wait to see if the pain will go away. Get medical help at once. Call your local emergency services. Do not drive yourself to the hospital. Call your healthcare provider:  If you have belly pain that gets worse or doesn't go away  If you vomit blood or have black bowel movements  If you are losing weight (without trying)  Avoid NSAIDs- (Aspirin, Ibuprofen, Advil, Motrin, Naproxen, Aleve)  Avoid alcoholic beverages  Take all medications as prescribed.  Call your healthcare provider for a follow-up appointment within one week

## 2017-05-19 NOTE — DISCHARGE NOTE ADULT - HOSPITAL COURSE
per attending 67 y/o F--patient previously followed by Dr. Venegas above but apparently followed by a physician in [facility], but is in the process of seeing Dr. FELY Salcedo in the office--history and medication list from patient with latter from patient's spouse via phone-- patient with a history or localized breast CA with past tylectomy presumed to be in remission since 1997, abnormal pharmacologic cardiac stress in 11/2015, hypernephroma S/P partial nephrectomy presumed to be disease free, COPD with patient quit tobacco in 1997 (1 ppd x 25 years cigarettes), complicated course following 2 laminectomies with progressive functional decline with transient decrease in ADL, chronic pain syndrome, chronic anxiety disorder benzodiazepine dependent ([facility]  provided to chart).  Patient reports that she was just discharged 2 days ago from [facility] (from the ER?) with patient with same presentation with patient receiving systemic steroids and was discharged.   Patient notes a globus sensation and is afraid of choking with these episodes.  The patient is concerned when examiner corroborated the patient's history and medication list with the patient's spouse that her spouse would ascribe everything to anxiety.  Patient denies chest pain/pressure at present.  No dyspnea.  No throat pain.  No fever, no chills, no rigors.  No abdominal pain, no red blood per rectum or melena.  No back pain, no tearing back pain.  Increased urinary frequency.  Patient is requesting all medications to be IV--including patient's benzodiazepine.  No suicidal or homicidal ideation.  No anhedonia, but patient anxious.  Remaining review of systems not contributory.    [place]y of hospital course:  Patient was seen and evaluated and followed by multiple specialists throughout the stay and treated medically with improvement.  Patient was otherwise stable and had no other complications.  Patient was discharged for inpatient psychiatric care in stable condition to follow up with primary doctor as outpatient for follow up and further care.

## 2017-05-19 NOTE — DISCHARGE NOTE ADULT - MEDICATION SUMMARY - MEDICATIONS TO TAKE
I will START or STAY ON the medications listed below when I get home from the hospital:    aspirin 81 mg oral tablet, chewable  -- 1 tab(s) by mouth once a day  -- Indication: For CAD    oxyCODONE 5 mg oral tablet  -- 1 tab(s) by mouth every 6 hours, As needed, Moderate Pain (4 - 6)  -- Indication: For pain    Ativan 1 mg oral tablet  --  by mouth 4 times a day  -- Indication: For Anxiety disorder    gabapentin 400 mg oral tablet  --  by mouth 2 times a day  -- Indication: For pain    DULoxetine 60 mg oral delayed release capsule  -- 1 cap(s) by mouth once a day  -- Indication: For depression    mirtazapine 15 mg oral tablet  -- 1 tab(s) by mouth once a day (at bedtime)  -- Indication: For depression    loperamide 2 mg oral capsule  -- 1 cap(s) by mouth 4 times a day  -- Indication: For diarrhea    loratadine 10 mg oral tablet  -- 1 tab(s) by mouth once a day  -- Indication: For Allergies    atorvastatin 40 mg oral tablet  -- 1 tab(s) by mouth once a day (at bedtime)  -- Indication: For HLD    risperiDONE 2 mg oral tablet, disintegrating  -- 1 tab(s) by mouth   -- Indication: For Anxiety disorder    atenolol 50 mg oral tablet  -- 1 tab(s) by mouth once a day  -- Indication: For HTN    budesonide-formoterol 160 mcg-4.5 mcg/inh inhalation aerosol  -- 1 puff(s) inhaled 2 times a day  -- Indication: For COPD (chronic obstructive pulmonary disease)    tiotropium 18 mcg inhalation capsule  -- 1 cap(s) inhaled once a day  -- Indication: For COPD (chronic obstructive pulmonary disease)    Norvasc 2.5 mg oral tablet  -- 1 tab(s) by mouth once a day  -- Indication: For HTN    sucralfate 1 g oral tablet  -- 1 tab(s) by mouth 3 times a day  -- Indication: For gastritis    lactobacillus acidophilus oral capsule  -- 1 cap(s) by mouth 3 times a day (with meals)  -- Indication: For gastritis    pantoprazole 40 mg oral delayed release tablet  -- 1 tab(s) by mouth 2 times a day (before meals)  -- Indication: For gastritis    levothyroxine 125 mcg (0.125 mg) oral tablet  -- 1 tab(s) by mouth   -- Indication: For Hypothyroid    cholecalciferol oral tablet  -- 2000 unit(s) by mouth once a day  -- Indication: For Supplement    cyanocobalamin 100 mcg oral tablet  -- 1 tab(s) by mouth once a day  -- Indication: For Supplement    folic acid 1 mg oral tablet  -- 1 tab(s) by mouth once a day  -- Indication: For Supplement

## 2017-05-22 LAB — SURGICAL PATHOLOGY STUDY: SIGNIFICANT CHANGE UP

## 2017-06-19 ENCOUNTER — EMERGENCY (EMERGENCY)
Facility: HOSPITAL | Age: 67
LOS: 1 days | Discharge: HOME | End: 2017-06-19
Attending: EMERGENCY MEDICINE
Payer: MEDICARE

## 2017-06-19 VITALS
DIASTOLIC BLOOD PRESSURE: 83 MMHG | HEART RATE: 65 BPM | WEIGHT: 199.96 LBS | RESPIRATION RATE: 16 BRPM | SYSTOLIC BLOOD PRESSURE: 127 MMHG | TEMPERATURE: 98 F

## 2017-06-19 VITALS
HEART RATE: 93 BPM | TEMPERATURE: 98 F | OXYGEN SATURATION: 97 % | DIASTOLIC BLOOD PRESSURE: 67 MMHG | RESPIRATION RATE: 16 BRPM | SYSTOLIC BLOOD PRESSURE: 119 MMHG

## 2017-06-19 DIAGNOSIS — Z98.89 OTHER SPECIFIED POSTPROCEDURAL STATES: Chronic | ICD-10-CM

## 2017-06-19 DIAGNOSIS — G47.30 SLEEP APNEA, UNSPECIFIED: ICD-10-CM

## 2017-06-19 DIAGNOSIS — E03.9 HYPOTHYROIDISM, UNSPECIFIED: ICD-10-CM

## 2017-06-19 DIAGNOSIS — Z87.891 PERSONAL HISTORY OF NICOTINE DEPENDENCE: ICD-10-CM

## 2017-06-19 DIAGNOSIS — Z85.528 PERSONAL HISTORY OF OTHER MALIGNANT NEOPLASM OF KIDNEY: ICD-10-CM

## 2017-06-19 DIAGNOSIS — Z85.3 PERSONAL HISTORY OF MALIGNANT NEOPLASM OF BREAST: ICD-10-CM

## 2017-06-19 DIAGNOSIS — I10 ESSENTIAL (PRIMARY) HYPERTENSION: ICD-10-CM

## 2017-06-19 DIAGNOSIS — R06.89 OTHER ABNORMALITIES OF BREATHING: ICD-10-CM

## 2017-06-19 DIAGNOSIS — Z90.49 ACQUIRED ABSENCE OF OTHER SPECIFIED PARTS OF DIGESTIVE TRACT: Chronic | ICD-10-CM

## 2017-06-19 DIAGNOSIS — J44.9 CHRONIC OBSTRUCTIVE PULMONARY DISEASE, UNSPECIFIED: ICD-10-CM

## 2017-06-19 DIAGNOSIS — R13.10 DYSPHAGIA, UNSPECIFIED: ICD-10-CM

## 2017-06-19 DIAGNOSIS — E78.5 HYPERLIPIDEMIA, UNSPECIFIED: ICD-10-CM

## 2017-06-19 DIAGNOSIS — Z86.718 PERSONAL HISTORY OF OTHER VENOUS THROMBOSIS AND EMBOLISM: ICD-10-CM

## 2017-06-19 DIAGNOSIS — C79.9 SECONDARY MALIGNANT NEOPLASM OF UNSPECIFIED SITE: ICD-10-CM

## 2017-06-19 DIAGNOSIS — E66.9 OBESITY, UNSPECIFIED: ICD-10-CM

## 2017-06-19 DIAGNOSIS — Z90.5 ACQUIRED ABSENCE OF KIDNEY: Chronic | ICD-10-CM

## 2017-06-19 DIAGNOSIS — R07.89 OTHER CHEST PAIN: ICD-10-CM

## 2017-06-19 DIAGNOSIS — F41.9 ANXIETY DISORDER, UNSPECIFIED: ICD-10-CM

## 2017-06-19 DIAGNOSIS — R00.1 BRADYCARDIA, UNSPECIFIED: ICD-10-CM

## 2017-06-19 LAB
ALBUMIN SERPL ELPH-MCNC: 3.4 G/DL — LOW (ref 3.5–5)
ALP SERPL-CCNC: 141 U/L — HIGH (ref 40–120)
ALT FLD-CCNC: 82 U/L DA — HIGH (ref 10–60)
ANION GAP SERPL CALC-SCNC: 7 MMOL/L — SIGNIFICANT CHANGE UP (ref 5–17)
APTT BLD: 38.8 SEC — HIGH (ref 27.5–37.4)
AST SERPL-CCNC: 50 U/L — HIGH (ref 10–40)
BASOPHILS # BLD AUTO: 0 K/UL — SIGNIFICANT CHANGE UP (ref 0–0.2)
BASOPHILS NFR BLD AUTO: 0.4 % — SIGNIFICANT CHANGE UP (ref 0–2)
BILIRUB SERPL-MCNC: 1 MG/DL — SIGNIFICANT CHANGE UP (ref 0.2–1.2)
BUN SERPL-MCNC: 7 MG/DL — SIGNIFICANT CHANGE UP (ref 7–18)
CALCIUM SERPL-MCNC: 9.9 MG/DL — SIGNIFICANT CHANGE UP (ref 8.4–10.5)
CHLORIDE SERPL-SCNC: 98 MMOL/L — SIGNIFICANT CHANGE UP (ref 96–108)
CO2 SERPL-SCNC: 30 MMOL/L — SIGNIFICANT CHANGE UP (ref 22–31)
CREAT SERPL-MCNC: 0.7 MG/DL — SIGNIFICANT CHANGE UP (ref 0.5–1.3)
EOSINOPHIL # BLD AUTO: 0 K/UL — SIGNIFICANT CHANGE UP (ref 0–0.5)
EOSINOPHIL NFR BLD AUTO: 0.3 % — SIGNIFICANT CHANGE UP (ref 0–6)
GLUCOSE SERPL-MCNC: 129 MG/DL — HIGH (ref 70–99)
HCT VFR BLD CALC: 43.1 % — SIGNIFICANT CHANGE UP (ref 34.5–45)
HGB BLD-MCNC: 15.4 G/DL — SIGNIFICANT CHANGE UP (ref 11.5–15.5)
INR BLD: 1.04 RATIO — SIGNIFICANT CHANGE UP (ref 0.88–1.16)
LYMPHOCYTES # BLD AUTO: 1.3 K/UL — SIGNIFICANT CHANGE UP (ref 1–3.3)
LYMPHOCYTES # BLD AUTO: 9.8 % — LOW (ref 13–44)
MCHC RBC-ENTMCNC: 33.3 PG — SIGNIFICANT CHANGE UP (ref 27–34)
MCHC RBC-ENTMCNC: 35.8 GM/DL — SIGNIFICANT CHANGE UP (ref 32–36)
MCV RBC AUTO: 93 FL — SIGNIFICANT CHANGE UP (ref 80–100)
MONOCYTES # BLD AUTO: 0.6 K/UL — SIGNIFICANT CHANGE UP (ref 0–0.9)
MONOCYTES NFR BLD AUTO: 4.4 % — SIGNIFICANT CHANGE UP (ref 2–14)
NEUTROPHILS # BLD AUTO: 11.1 K/UL — HIGH (ref 1.8–7.4)
NEUTROPHILS NFR BLD AUTO: 85.2 % — HIGH (ref 43–77)
PLATELET # BLD AUTO: 165 K/UL — SIGNIFICANT CHANGE UP (ref 150–400)
POTASSIUM SERPL-MCNC: 4.4 MMOL/L — SIGNIFICANT CHANGE UP (ref 3.5–5.3)
POTASSIUM SERPL-SCNC: 4.4 MMOL/L — SIGNIFICANT CHANGE UP (ref 3.5–5.3)
PROT SERPL-MCNC: 7.6 G/DL — SIGNIFICANT CHANGE UP (ref 6–8.3)
PROTHROM AB SERPL-ACNC: 11.3 SEC — SIGNIFICANT CHANGE UP (ref 9.8–12.7)
RBC # BLD: 4.64 M/UL — SIGNIFICANT CHANGE UP (ref 3.8–5.2)
RBC # FLD: 12.7 % — SIGNIFICANT CHANGE UP (ref 10.3–14.5)
SODIUM SERPL-SCNC: 135 MMOL/L — SIGNIFICANT CHANGE UP (ref 135–145)
TROPONIN I SERPL-MCNC: <0.015 NG/ML — SIGNIFICANT CHANGE UP (ref 0–0.04)
TROPONIN I SERPL-MCNC: <0.015 NG/ML — SIGNIFICANT CHANGE UP (ref 0–0.04)
WBC # BLD: 13 K/UL — HIGH (ref 3.8–10.5)
WBC # FLD AUTO: 13 K/UL — HIGH (ref 3.8–10.5)

## 2017-06-19 PROCEDURE — 84484 ASSAY OF TROPONIN QUANT: CPT

## 2017-06-19 PROCEDURE — 99284 EMERGENCY DEPT VISIT MOD MDM: CPT | Mod: 25

## 2017-06-19 PROCEDURE — 93005 ELECTROCARDIOGRAM TRACING: CPT

## 2017-06-19 PROCEDURE — 85730 THROMBOPLASTIN TIME PARTIAL: CPT

## 2017-06-19 PROCEDURE — 85610 PROTHROMBIN TIME: CPT

## 2017-06-19 PROCEDURE — 85027 COMPLETE CBC AUTOMATED: CPT

## 2017-06-19 PROCEDURE — 80053 COMPREHEN METABOLIC PANEL: CPT

## 2017-06-19 PROCEDURE — 99284 EMERGENCY DEPT VISIT MOD MDM: CPT

## 2017-06-19 RX ORDER — SODIUM CHLORIDE 9 MG/ML
3 INJECTION INTRAMUSCULAR; INTRAVENOUS; SUBCUTANEOUS ONCE
Qty: 0 | Refills: 0 | Status: COMPLETED | OUTPATIENT
Start: 2017-06-19 | End: 2017-06-19

## 2017-06-19 RX ADMIN — SODIUM CHLORIDE 3 MILLILITER(S): 9 INJECTION INTRAMUSCULAR; INTRAVENOUS; SUBCUTANEOUS at 16:25

## 2017-06-19 NOTE — ED PROVIDER NOTE - OBJECTIVE STATEMENT
67 y/o F pt with PMHx of Anxiety, Breast CA, COPD, DVT, Grave's Disease, HLD, HTN, Hypothyroidism, Kidney Cancer, and Obesity and significant PSHx of Breast Biopsy (L), Sleep Apnea, Cholecystectomy, Laminectomy (pt has difficulty ambulating since laminectomy), and Nephrectomy presents to ED c/o chest tightness and shaking since today. Pt also reports difficulty swallowing and difficulty breathing. Pt states she had an asthma attack earlier today; pt also states her  had "something on him" that caused her to begin shaking and feeling chest tightness, prompting her ED visit. Pt took Prednisone with no relief of sx's. Pt denies CP, nausea, or any other complaints. Pt also denies Hx of DM, or taking blood thinners daily. Pt reports she was awake the entire time during the episode of shaking. NKDA. 67 y/o F pt with PMHx of Anxiety, Asthma (not on home O2), Breast CA, COPD, DVT, Grave's Disease, HLD, HTN, Hypothyroidism, Kidney Cancer, and Obesity and significant PSHx of Breast Biopsy (L), Sleep Apnea, Cholecystectomy, Laminectomy (pt has difficulty ambulating since laminectomy), and Nephrectomy presents to ED c/o chest tightness and shaking since today. Pt also reports difficulty swallowing and difficulty breathing. Pt states she had an asthma attack earlier today; pt also states her  had "something on him" that caused her to begin shaking and feeling chest tightness, prompting her ED visit. Pt took Prednisone with no relief of sx's. Pt denies CP, nausea, or any other complaints. Pt also denies Hx of DM, or taking blood thinners daily. Pt reports she was awake the entire time during the episode of shaking. Pt underwent a stress test and workup to r/o PE at [facility] x1 month ago; all tests were negative. NKDA.

## 2017-06-19 NOTE — ED PROVIDER NOTE - PROGRESS NOTE DETAILS
Patient is asking to leave. She had a cardiac and PE r/o last month at [facility]. She has atypical CP with recent workup and is asking to leave now. Risks, benefits, alternatives and hazards reviewed.

## 2017-06-19 NOTE — ED PROVIDER NOTE - MUSCULOSKELETAL, MLM
Spine appears normal, range of motion is not limited, no muscle or joint tenderness. No calf tenderness b/l.

## 2017-06-19 NOTE — ED PROVIDER NOTE - PMH
Anxiety    Breast cancer in situ, left    COPD (chronic obstructive pulmonary disease)    DVT (deep venous thrombosis)  history of- not presently on A/C  Graves disease    Hyperlipidemia    Hypertension    Hypothyroid    Kidney cancer, primary, with metastasis from kidney to other site, left  LEft sided- s/p nephrectomy only- no chemo or RT  Obesity    Sleep apnea Anxiety    Asthma    Breast cancer in situ, left    COPD (chronic obstructive pulmonary disease)    DVT (deep venous thrombosis)  history of- not presently on A/C  Graves disease    Hyperlipidemia    Hypertension    Hypothyroid    Kidney cancer, primary, with metastasis from kidney to other site, left  LEft sided- s/p nephrectomy only- no chemo or RT  Obesity    Sleep apnea

## 2017-06-19 NOTE — ED ADULT TRIAGE NOTE - CHIEF COMPLAINT QUOTE
as per the pt " I woke up this morning with asthma attack and I started getting chest tightness and to mid sternal , I thing amalia having allergic reaction"

## 2017-06-19 NOTE — ED PROVIDER NOTE - MEDICAL DECISION MAKING DETAILS
67 y/o F pt presents with chest tightness, shaking, and difficulty breathing since today. Plan for blood work, EKG, and reassess.

## 2017-06-20 ENCOUNTER — EMERGENCY (EMERGENCY)
Facility: HOSPITAL | Age: 67
LOS: 1 days | Discharge: HOME | End: 2017-06-20
Attending: INTERNAL MEDICINE
Payer: MEDICARE

## 2017-06-20 VITALS
DIASTOLIC BLOOD PRESSURE: 63 MMHG | SYSTOLIC BLOOD PRESSURE: 141 MMHG | HEART RATE: 68 BPM | OXYGEN SATURATION: 97 % | RESPIRATION RATE: 18 BRPM

## 2017-06-20 VITALS
HEART RATE: 64 BPM | TEMPERATURE: 98 F | DIASTOLIC BLOOD PRESSURE: 78 MMHG | RESPIRATION RATE: 18 BRPM | HEIGHT: 62 IN | SYSTOLIC BLOOD PRESSURE: 154 MMHG | OXYGEN SATURATION: 98 % | WEIGHT: 184.97 LBS

## 2017-06-20 DIAGNOSIS — E66.9 OBESITY, UNSPECIFIED: ICD-10-CM

## 2017-06-20 DIAGNOSIS — R69 ILLNESS, UNSPECIFIED: ICD-10-CM

## 2017-06-20 DIAGNOSIS — Z98.89 OTHER SPECIFIED POSTPROCEDURAL STATES: Chronic | ICD-10-CM

## 2017-06-20 DIAGNOSIS — Z85.3 PERSONAL HISTORY OF MALIGNANT NEOPLASM OF BREAST: ICD-10-CM

## 2017-06-20 DIAGNOSIS — F41.9 ANXIETY DISORDER, UNSPECIFIED: ICD-10-CM

## 2017-06-20 DIAGNOSIS — F29 UNSPECIFIED PSYCHOSIS NOT DUE TO A SUBSTANCE OR KNOWN PHYSIOLOGICAL CONDITION: ICD-10-CM

## 2017-06-20 DIAGNOSIS — G47.30 SLEEP APNEA, UNSPECIFIED: ICD-10-CM

## 2017-06-20 DIAGNOSIS — Z86.718 PERSONAL HISTORY OF OTHER VENOUS THROMBOSIS AND EMBOLISM: ICD-10-CM

## 2017-06-20 DIAGNOSIS — E03.9 HYPOTHYROIDISM, UNSPECIFIED: ICD-10-CM

## 2017-06-20 DIAGNOSIS — Z90.49 ACQUIRED ABSENCE OF OTHER SPECIFIED PARTS OF DIGESTIVE TRACT: Chronic | ICD-10-CM

## 2017-06-20 DIAGNOSIS — E78.5 HYPERLIPIDEMIA, UNSPECIFIED: ICD-10-CM

## 2017-06-20 DIAGNOSIS — I10 ESSENTIAL (PRIMARY) HYPERTENSION: ICD-10-CM

## 2017-06-20 DIAGNOSIS — Z90.5 ACQUIRED ABSENCE OF KIDNEY: Chronic | ICD-10-CM

## 2017-06-20 DIAGNOSIS — J44.9 CHRONIC OBSTRUCTIVE PULMONARY DISEASE, UNSPECIFIED: ICD-10-CM

## 2017-06-20 DIAGNOSIS — Z85.528 PERSONAL HISTORY OF OTHER MALIGNANT NEOPLASM OF KIDNEY: ICD-10-CM

## 2017-06-20 LAB
ALBUMIN SERPL ELPH-MCNC: 3.3 G/DL — LOW (ref 3.5–5)
ALP SERPL-CCNC: 144 U/L — HIGH (ref 40–120)
ALT FLD-CCNC: 84 U/L DA — HIGH (ref 10–60)
ANION GAP SERPL CALC-SCNC: 10 MMOL/L — SIGNIFICANT CHANGE UP (ref 5–17)
AST SERPL-CCNC: 62 U/L — HIGH (ref 10–40)
BASOPHILS # BLD AUTO: 0.1 K/UL — SIGNIFICANT CHANGE UP (ref 0–0.2)
BASOPHILS NFR BLD AUTO: 0.6 % — SIGNIFICANT CHANGE UP (ref 0–2)
BILIRUB SERPL-MCNC: 1.1 MG/DL — SIGNIFICANT CHANGE UP (ref 0.2–1.2)
BUN SERPL-MCNC: 7 MG/DL — SIGNIFICANT CHANGE UP (ref 7–18)
CALCIUM SERPL-MCNC: 9.4 MG/DL — SIGNIFICANT CHANGE UP (ref 8.4–10.5)
CHLORIDE SERPL-SCNC: 100 MMOL/L — SIGNIFICANT CHANGE UP (ref 96–108)
CK MB BLD-MCNC: 1.8 % — SIGNIFICANT CHANGE UP (ref 0–3.5)
CK MB CFR SERPL CALC: 2 NG/ML — SIGNIFICANT CHANGE UP (ref 0–3.6)
CK SERPL-CCNC: 110 U/L — SIGNIFICANT CHANGE UP (ref 21–215)
CO2 SERPL-SCNC: 26 MMOL/L — SIGNIFICANT CHANGE UP (ref 22–31)
CREAT SERPL-MCNC: 0.79 MG/DL — SIGNIFICANT CHANGE UP (ref 0.5–1.3)
EOSINOPHIL # BLD AUTO: 0 K/UL — SIGNIFICANT CHANGE UP (ref 0–0.5)
EOSINOPHIL NFR BLD AUTO: 0.3 % — SIGNIFICANT CHANGE UP (ref 0–6)
GLUCOSE SERPL-MCNC: 112 MG/DL — HIGH (ref 70–99)
HCT VFR BLD CALC: 46.6 % — HIGH (ref 34.5–45)
HGB BLD-MCNC: 16 G/DL — HIGH (ref 11.5–15.5)
LYMPHOCYTES # BLD AUTO: 1.3 K/UL — SIGNIFICANT CHANGE UP (ref 1–3.3)
LYMPHOCYTES # BLD AUTO: 12.1 % — LOW (ref 13–44)
MCHC RBC-ENTMCNC: 32.9 PG — SIGNIFICANT CHANGE UP (ref 27–34)
MCHC RBC-ENTMCNC: 34.4 GM/DL — SIGNIFICANT CHANGE UP (ref 32–36)
MCV RBC AUTO: 95.7 FL — SIGNIFICANT CHANGE UP (ref 80–100)
MONOCYTES # BLD AUTO: 0.5 K/UL — SIGNIFICANT CHANGE UP (ref 0–0.9)
MONOCYTES NFR BLD AUTO: 4.4 % — SIGNIFICANT CHANGE UP (ref 2–14)
NEUTROPHILS # BLD AUTO: 8.8 K/UL — HIGH (ref 1.8–7.4)
NEUTROPHILS NFR BLD AUTO: 82.7 % — HIGH (ref 43–77)
PLATELET # BLD AUTO: 160 K/UL — SIGNIFICANT CHANGE UP (ref 150–400)
POTASSIUM SERPL-MCNC: 4.8 MMOL/L — SIGNIFICANT CHANGE UP (ref 3.5–5.3)
POTASSIUM SERPL-SCNC: 4.8 MMOL/L — SIGNIFICANT CHANGE UP (ref 3.5–5.3)
PROT SERPL-MCNC: 7.9 G/DL — SIGNIFICANT CHANGE UP (ref 6–8.3)
RBC # BLD: 4.87 M/UL — SIGNIFICANT CHANGE UP (ref 3.8–5.2)
RBC # FLD: 13 % — SIGNIFICANT CHANGE UP (ref 10.3–14.5)
SODIUM SERPL-SCNC: 136 MMOL/L — SIGNIFICANT CHANGE UP (ref 135–145)
TROPONIN I SERPL-MCNC: <0.015 NG/ML — SIGNIFICANT CHANGE UP (ref 0–0.04)
WBC # BLD: 10.7 K/UL — HIGH (ref 3.8–10.5)
WBC # FLD AUTO: 10.7 K/UL — HIGH (ref 3.8–10.5)

## 2017-06-20 PROCEDURE — 85027 COMPLETE CBC AUTOMATED: CPT

## 2017-06-20 PROCEDURE — 84484 ASSAY OF TROPONIN QUANT: CPT

## 2017-06-20 PROCEDURE — 96374 THER/PROPH/DIAG INJ IV PUSH: CPT

## 2017-06-20 PROCEDURE — 82553 CREATINE MB FRACTION: CPT

## 2017-06-20 PROCEDURE — 99284 EMERGENCY DEPT VISIT MOD MDM: CPT | Mod: 25

## 2017-06-20 PROCEDURE — 90792 PSYCH DIAG EVAL W/MED SRVCS: CPT | Mod: GT

## 2017-06-20 PROCEDURE — 93005 ELECTROCARDIOGRAM TRACING: CPT

## 2017-06-20 PROCEDURE — 99285 EMERGENCY DEPT VISIT HI MDM: CPT

## 2017-06-20 PROCEDURE — 80053 COMPREHEN METABOLIC PANEL: CPT

## 2017-06-20 PROCEDURE — 82550 ASSAY OF CK (CPK): CPT

## 2017-06-20 RX ORDER — SODIUM CHLORIDE 9 MG/ML
3 INJECTION INTRAMUSCULAR; INTRAVENOUS; SUBCUTANEOUS ONCE
Qty: 0 | Refills: 0 | Status: COMPLETED | OUTPATIENT
Start: 2017-06-20 | End: 2017-06-20

## 2017-06-20 RX ADMIN — Medication 0.5 MILLIGRAM(S): at 10:11

## 2017-06-20 RX ADMIN — SODIUM CHLORIDE 3 MILLILITER(S): 9 INJECTION INTRAMUSCULAR; INTRAVENOUS; SUBCUTANEOUS at 09:56
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
## 2017-06-20 NOTE — ED PROVIDER NOTE - MEDICAL DECISION MAKING DETAILS
?safe home environment vs delusions, anxiety vs somatic disease. No signs/sxs c/w cardiopulmonary disease. Will give anxiolytic, obtain ancillary hx and reassess
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
## 2017-06-20 NOTE — ED BEHAVIORAL HEALTH ASSESSMENT NOTE - AXIS III
asthma, COPD, hx of breast CA, kidney CA, DVT, HLD, HTN, Grave's disease, hypothyroidsm, obeity, cholecystectemy, laminectemy

## 2017-06-20 NOTE — ED PROVIDER NOTE - CONSTITUTIONAL, MLM
normal... Moderately anxious appearing, tearful, awake, alert, oriented to person, place, time/situation and in no apparent distress. pt is intermittently tremulous but is easily controlled.

## 2017-06-20 NOTE — ED BEHAVIORAL HEALTH ASSESSMENT NOTE - RISK ASSESSMENT
Minimal risk of harming self or others. No current or hx of suicidality. Future oriented and hopeful. Currently paranoid towards . But no hx of violent behavior and currently calm in ED.

## 2017-06-20 NOTE — ED BEHAVIORAL HEALTH ASSESSMENT NOTE - DETAILS
Pt's sister has depression as per chart. Initial complaints of SOB, chest tightness now resolved Spoke with Dr. Chambers 46 year old developmentally disabled son primarily cared for by

## 2017-06-20 NOTE — ED BEHAVIORAL HEALTH ASSESSMENT NOTE - SUMMARY
67 y/o  female, accompanied by , with multiple somatic complaints and paranoid delusions related to her . Seen multiple times this year for the same complaints; medically cleared. The delusions are chronic in nature, with intermittent worsening of sx. This time the sx started about a week ago as per . No identifiable trigger; reportedly med compliant. Would benefit from hospitalization given the multiple ER visits recently. But at this time refusing admission and feels safe going home. Not an acute threat to self/others currently; no grounds for involuntary hospitalization. Will discharge with outpatient follow up (appt. with Dr. De La Paz coming up); will need re-evaluation of meds. Left message at Dr. De La Paz's office for a call back.

## 2017-06-20 NOTE — ED ADULT NURSE NOTE - PMH
Anxiety    Asthma    Breast cancer in situ, left    COPD (chronic obstructive pulmonary disease)    DVT (deep venous thrombosis)  history of- not presently on A/C  Graves disease    Hyperlipidemia    Hypertension    Hypothyroid    Kidney cancer, primary, with metastasis from kidney to other site, left  LEft sided- s/p nephrectomy only- no chemo or RT  Obesity    Sleep apnea

## 2017-06-20 NOTE — ED BEHAVIORAL HEALTH ASSESSMENT NOTE - OTHER
Telepsychiatry multiple medical issues, developmentally disabled adult son living at home Unable to assess; telepsych No head and upper extremity abnomal movements; telepsych
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
## 2017-06-20 NOTE — ED PROVIDER NOTE - CARDIAC, MLM
Normal rate, regular rhythm.  Heart sounds S1, S2.  No murmurs, rubs or gallops. Pt w/ trace nonpitting edema to b/l LE.

## 2017-06-20 NOTE — ED BEHAVIORAL HEALTH ASSESSMENT NOTE - DESCRIPTION
Initially presented very anxious. But after receiving Ativan, she is much calmer. Lives with  and son asthma, COPD, breast CA, kidney CA, HTN, HLD, Grave's disease, hypothyroidism, cholecystectemy, laminectemy

## 2017-06-20 NOTE — ED ADULT TRIAGE NOTE - CHIEF COMPLAINT QUOTE
c/o "allergic reaction something'. patient stated she was here yesterday too. c/o shakiness and throat closing

## 2017-06-20 NOTE — ED PROVIDER NOTE - PROGRESS NOTE DETAILS
Pt sxs resolved with ativan, after d/w  and psychiatry, pt has hx of delusions and psychiatric admission. She has been medically admitted twice this month with extensive w/u, cardiac clearance, etc. and recommended psychiatric eval and admission for sxs. She and  do not want psychiatric admission and report will f/u with her outpt pychiatrist today. They are requesting d/c home with outpt f/u.

## 2017-06-20 NOTE — ED BEHAVIORAL HEALTH ASSESSMENT NOTE - SUICIDE PROTECTIVE FACTORS
Responsibility to family and others/Identifies reasons for living/Supportive social network or family

## 2017-06-23 NOTE — ED ADULT NURSE NOTE - TEMPLATE
Surgery follow-up  Doing good. Sitting up at the edge of the bed. Findings at surgery discussed.  Maximum temperature 99.6.  Wound assessed. Packing in place. No erythema. Less induration. Redness on the skin due to tape.  Culture: Gram stain shows gram-positive cocci and gram-positive bacilli.  White blood count down to 9.8 with 83% segs. C-reactive protein 5.1.  Blood glucose level still running high.  Hemoglobin A1c 13    From surgical standpoint will ask wound care to see the patient. I do not think he will need IV antibiotics as an outpatient. Once we have a good idea of the infection he could be converted to oral antibiotics and discharged from a surgical standpoint with appropriate outpatient wound management.   General

## 2018-01-23 ENCOUNTER — EMERGENCY (EMERGENCY)
Facility: HOSPITAL | Age: 68
LOS: 1 days | Discharge: HOME | End: 2018-01-23
Attending: EMERGENCY MEDICINE | Admitting: EMERGENCY MEDICINE
Payer: MEDICARE

## 2018-01-23 VITALS
OXYGEN SATURATION: 94 % | HEART RATE: 95 BPM | RESPIRATION RATE: 22 BRPM | DIASTOLIC BLOOD PRESSURE: 65 MMHG | SYSTOLIC BLOOD PRESSURE: 153 MMHG

## 2018-01-23 VITALS
DIASTOLIC BLOOD PRESSURE: 65 MMHG | OXYGEN SATURATION: 98 % | SYSTOLIC BLOOD PRESSURE: 135 MMHG | TEMPERATURE: 99 F | RESPIRATION RATE: 24 BRPM | HEART RATE: 79 BPM

## 2018-01-23 DIAGNOSIS — Z98.89 OTHER SPECIFIED POSTPROCEDURAL STATES: Chronic | ICD-10-CM

## 2018-01-23 DIAGNOSIS — Z90.49 ACQUIRED ABSENCE OF OTHER SPECIFIED PARTS OF DIGESTIVE TRACT: Chronic | ICD-10-CM

## 2018-01-23 DIAGNOSIS — Z90.5 ACQUIRED ABSENCE OF KIDNEY: Chronic | ICD-10-CM

## 2018-01-23 PROCEDURE — 71045 X-RAY EXAM CHEST 1 VIEW: CPT | Mod: 26

## 2018-01-23 PROCEDURE — 93010 ELECTROCARDIOGRAM REPORT: CPT

## 2018-01-23 PROCEDURE — 99284 EMERGENCY DEPT VISIT MOD MDM: CPT | Mod: 25,GC

## 2018-01-23 RX ORDER — IPRATROPIUM/ALBUTEROL SULFATE 18-103MCG
3 AEROSOL WITH ADAPTER (GRAM) INHALATION ONCE
Qty: 0 | Refills: 0 | Status: COMPLETED | OUTPATIENT
Start: 2018-01-23 | End: 2018-01-23

## 2018-01-23 RX ADMIN — Medication 3 MILLILITER(S): at 07:00

## 2018-01-23 RX ADMIN — Medication 3 MILLILITER(S): at 07:27

## 2018-01-23 RX ADMIN — Medication 40 MILLIGRAM(S): at 07:34

## 2018-01-23 NOTE — ED ADULT TRIAGE NOTE - CHIEF COMPLAINT QUOTE
Brought into ED by EMS from home.  Respirations are equal but labored and not able to speak in full sentences.  Complaining of SOB and non productive cough that progressively worsen.  Saw PCP on Thursday and prescribed medications without relief.  Patient is paralyzed and at baseline she has bilateral edema to lower extremities.  Complaining of rash to lower extremities that progressively worsen and no improvement with topical medication. Brought into ED by EMS from home.  Respirations are equal but labored and not able to speak in full sentences.  Arrived on 2L [place] oxygen.  Complaining of SOB and non productive cough that progressively worsen.  Saw PCP on Thursday and prescribed medications without relief.  Patient is paralyzed and at baseline she has bilateral edema to lower extremities.  Complaining of rash to lower extremities that progressively worsen and no improvement with topical medication.

## 2018-01-23 NOTE — ED PROVIDER NOTE - PROGRESS NOTE DETAILS
JOHNATHON Sandra: Pt feeling relief of cough following nebs and prednisone, will arrange for ambulette  and d/c home with PMD and wound care follow up.

## 2018-01-23 NOTE — ED PROVIDER NOTE - PLAN OF CARE
Follow up with your primary care provider within 48 hours  Continue to see wound care  Take Prednisone 40mg (2 tablets) daily for 4 days  Take albuterol nebulizer every 4-6 hours as needed for cough or wheeze  Return to the emergency department with any worsening or concerning symptoms, increased cough, shortness of breath, chest pain, new onset fever or any other concerns.

## 2018-01-23 NOTE — ED PROVIDER NOTE - OBJECTIVE STATEMENT
67F h/o asthma (no intubations), HTN, HLD, paralyzed from waste down, DVT not on a/c p/w dry cough x 1 week, worse overnight. Took unknown abx last week. Also endorses unchanged lower extremity redness x 1 month. Endorses intermittent chills. +Bodyaches.  had a cold last week. Denies fever, nausea, vomiting, dysuria, hematuria, neck pain. 67F h/o asthma (no intubations), HTN, HLD, paralyzed from waste down, DVT not on a/c p/w dry cough x 1 week, worse overnight. Took unknown abx last week. Also endorses unchanged lower extremity redness x 1 month. Endorses intermittent chills. +Bodyaches.  had a cold last week. Denies fever, nausea, vomiting, dysuria, hematuria, neck pain.  Klepfish: 67F PMH asthma, HTN, HLD, DVT no on AC, b/l LE paralysis (x2yrs, after a back surgery) p/w dry cough x1w. Today had coughing episode and  thought she looked pale so brought to ED. +body aches x1w. +b/l chronic LE edema. +b/l LE erythema x1mo, no recent changes, following w/ wound care doctor. Denies SOB/CP, f/c, NVD, abd pain, urinary complaints, black/bloody stool.

## 2018-01-23 NOTE — ED PROVIDER NOTE - CARE PLAN
Principal Discharge DX:	URI (upper respiratory infection) Principal Discharge DX:	URI (upper respiratory infection)  Assessment and plan of treatment:	Follow up with your primary care provider within 48 hours  Continue to see wound care  Take Prednisone 40mg (2 tablets) daily for 4 days  Take albuterol nebulizer every 4-6 hours as needed for cough or wheeze  Return to the emergency department with any worsening or concerning symptoms, increased cough, shortness of breath, chest pain, new onset fever or any other concerns.

## 2018-01-23 NOTE — ED ADULT NURSE NOTE - CHIEF COMPLAINT QUOTE
Brought into ED by EMS from home.  Respirations are equal but labored and not able to speak in full sentences.  Arrived on 2L NC oxygen.  Complaining of SOB and non productive cough that progressively worsen.  Saw PCP on Thursday and prescribed medications without relief.  Patient is paralyzed and at baseline she has bilateral edema to lower extremities.  Complaining of rash to lower extremities that progressively worsen and no improvement with topical medication.

## 2018-01-23 NOTE — ED PROVIDER NOTE - ATTENDING CONTRIBUTION TO CARE
67F PMH asthma, HTN, HLD, DVT no on AC, b/l LE paralysis (x2yrs, after a back surgery) p/w dry cough x1w. Today had coughing episode and  thought she looked pale so brought to ED. +body aches x1w. +b/l chronic LE edema. +b/l LE erythema x1mo, no recent changes, following w/ wound care doctor.  Very slight tachypnea, other vitals wnl. Exam as above.   ddx: Likely URI w/ mild RAD component.   CXR, duoneb, reassess.

## 2018-07-19 ENCOUNTER — INPATIENT (INPATIENT)
Facility: HOSPITAL | Age: 68
LOS: 3 days | Discharge: HOME HEALTH | End: 2018-07-23
Attending: HOSPITALIST | Admitting: HOSPITALIST
Payer: MEDICARE

## 2018-07-19 VITALS
TEMPERATURE: 98 F | RESPIRATION RATE: 20 BRPM | WEIGHT: 220.02 LBS | HEIGHT: 60 IN | OXYGEN SATURATION: 100 % | HEART RATE: 87 BPM | DIASTOLIC BLOOD PRESSURE: 78 MMHG | SYSTOLIC BLOOD PRESSURE: 117 MMHG

## 2018-07-19 DIAGNOSIS — J44.1 CHRONIC OBSTRUCTIVE PULMONARY DISEASE WITH (ACUTE) EXACERBATION: ICD-10-CM

## 2018-07-19 DIAGNOSIS — Z98.89 OTHER SPECIFIED POSTPROCEDURAL STATES: Chronic | ICD-10-CM

## 2018-07-19 DIAGNOSIS — C64.2 MALIGNANT NEOPLASM OF LEFT KIDNEY, EXCEPT RENAL PELVIS: ICD-10-CM

## 2018-07-19 DIAGNOSIS — Z90.5 ACQUIRED ABSENCE OF KIDNEY: Chronic | ICD-10-CM

## 2018-07-19 DIAGNOSIS — E87.5 HYPERKALEMIA: ICD-10-CM

## 2018-07-19 DIAGNOSIS — E03.9 HYPOTHYROIDISM, UNSPECIFIED: ICD-10-CM

## 2018-07-19 DIAGNOSIS — F41.9 ANXIETY DISORDER, UNSPECIFIED: ICD-10-CM

## 2018-07-19 DIAGNOSIS — Z29.9 ENCOUNTER FOR PROPHYLACTIC MEASURES, UNSPECIFIED: ICD-10-CM

## 2018-07-19 DIAGNOSIS — I10 ESSENTIAL (PRIMARY) HYPERTENSION: ICD-10-CM

## 2018-07-19 DIAGNOSIS — E78.5 HYPERLIPIDEMIA, UNSPECIFIED: ICD-10-CM

## 2018-07-19 DIAGNOSIS — Z90.49 ACQUIRED ABSENCE OF OTHER SPECIFIED PARTS OF DIGESTIVE TRACT: Chronic | ICD-10-CM

## 2018-07-19 PROBLEM — G47.30 SLEEP APNEA, UNSPECIFIED: Chronic | Status: ACTIVE | Noted: 2017-06-19

## 2018-07-19 PROBLEM — J45.909 UNSPECIFIED ASTHMA, UNCOMPLICATED: Chronic | Status: ACTIVE | Noted: 2017-06-19

## 2018-07-19 LAB
ALBUMIN SERPL ELPH-MCNC: 3.4 G/DL — SIGNIFICANT CHANGE UP (ref 3.3–5)
ALP SERPL-CCNC: 105 U/L — SIGNIFICANT CHANGE UP (ref 40–120)
ALT FLD-CCNC: 45 U/L — SIGNIFICANT CHANGE UP (ref 12–78)
ANION GAP SERPL CALC-SCNC: 6 MMOL/L — SIGNIFICANT CHANGE UP (ref 5–17)
ANION GAP SERPL CALC-SCNC: 9 MMOL/L — SIGNIFICANT CHANGE UP (ref 5–17)
APPEARANCE UR: CLEAR — SIGNIFICANT CHANGE UP
APTT BLD: 31.8 SEC — SIGNIFICANT CHANGE UP (ref 27.5–37.4)
AST SERPL-CCNC: 28 U/L — SIGNIFICANT CHANGE UP (ref 15–37)
BACTERIA # UR AUTO: ABNORMAL
BASE EXCESS BLDA CALC-SCNC: 1.4 MMOL/L — SIGNIFICANT CHANGE UP (ref -2–2)
BASOPHILS # BLD AUTO: 0.03 K/UL — SIGNIFICANT CHANGE UP (ref 0–0.2)
BASOPHILS NFR BLD AUTO: 0.2 % — SIGNIFICANT CHANGE UP (ref 0–2)
BILIRUB SERPL-MCNC: 1.1 MG/DL — SIGNIFICANT CHANGE UP (ref 0.2–1.2)
BILIRUB UR-MCNC: NEGATIVE — SIGNIFICANT CHANGE UP
BLOOD GAS COMMENTS: SIGNIFICANT CHANGE UP
BLOOD GAS COMMENTS: SIGNIFICANT CHANGE UP
BLOOD GAS SOURCE: SIGNIFICANT CHANGE UP
BUN SERPL-MCNC: 20 MG/DL — SIGNIFICANT CHANGE UP (ref 7–23)
BUN SERPL-MCNC: 30 MG/DL — HIGH (ref 7–23)
CALCIUM SERPL-MCNC: 9.6 MG/DL — SIGNIFICANT CHANGE UP (ref 8.5–10.1)
CALCIUM SERPL-MCNC: 9.9 MG/DL — SIGNIFICANT CHANGE UP (ref 8.5–10.1)
CHLORIDE SERPL-SCNC: 102 MMOL/L — SIGNIFICANT CHANGE UP (ref 96–108)
CHLORIDE SERPL-SCNC: 105 MMOL/L — SIGNIFICANT CHANGE UP (ref 96–108)
CK MB CFR SERPL CALC: 1.5 NG/ML — SIGNIFICANT CHANGE UP (ref 0.5–3.6)
CO2 SERPL-SCNC: 27 MMOL/L — SIGNIFICANT CHANGE UP (ref 22–31)
CO2 SERPL-SCNC: 28 MMOL/L — SIGNIFICANT CHANGE UP (ref 22–31)
COLOR SPEC: YELLOW — SIGNIFICANT CHANGE UP
CREAT SERPL-MCNC: 0.88 MG/DL — SIGNIFICANT CHANGE UP (ref 0.5–1.3)
CREAT SERPL-MCNC: 1.01 MG/DL — SIGNIFICANT CHANGE UP (ref 0.5–1.3)
D DIMER BLD IA.RAPID-MCNC: 217 NG/ML DDU — SIGNIFICANT CHANGE UP
DIFF PNL FLD: NEGATIVE — SIGNIFICANT CHANGE UP
EOSINOPHIL # BLD AUTO: 0.03 K/UL — SIGNIFICANT CHANGE UP (ref 0–0.5)
EOSINOPHIL NFR BLD AUTO: 0.2 % — SIGNIFICANT CHANGE UP (ref 0–6)
EPI CELLS # UR: SIGNIFICANT CHANGE UP
GLUCOSE SERPL-MCNC: 109 MG/DL — HIGH (ref 70–99)
GLUCOSE SERPL-MCNC: 180 MG/DL — HIGH (ref 70–99)
GLUCOSE UR QL: NEGATIVE MG/DL — SIGNIFICANT CHANGE UP
HCO3 BLDA-SCNC: 26 MMOL/L — SIGNIFICANT CHANGE UP (ref 21–29)
HCT VFR BLD CALC: 45.2 % — HIGH (ref 34.5–45)
HGB BLD-MCNC: 14.9 G/DL — SIGNIFICANT CHANGE UP (ref 11.5–15.5)
HOROWITZ INDEX BLDA+IHG-RTO: 28 — SIGNIFICANT CHANGE UP
IMM GRANULOCYTES NFR BLD AUTO: 0.4 % — SIGNIFICANT CHANGE UP (ref 0–1.5)
INR BLD: 1.03 RATIO — SIGNIFICANT CHANGE UP (ref 0.88–1.16)
KETONES UR-MCNC: NEGATIVE — SIGNIFICANT CHANGE UP
LEUKOCYTE ESTERASE UR-ACNC: ABNORMAL
LIDOCAIN IGE QN: 50 U/L — LOW (ref 73–393)
LYMPHOCYTES # BLD AUTO: 1.3 K/UL — SIGNIFICANT CHANGE UP (ref 1–3.3)
LYMPHOCYTES # BLD AUTO: 8.3 % — LOW (ref 13–44)
MCHC RBC-ENTMCNC: 31.6 PG — SIGNIFICANT CHANGE UP (ref 27–34)
MCHC RBC-ENTMCNC: 33 GM/DL — SIGNIFICANT CHANGE UP (ref 32–36)
MCV RBC AUTO: 96 FL — SIGNIFICANT CHANGE UP (ref 80–100)
MONOCYTES # BLD AUTO: 0.82 K/UL — SIGNIFICANT CHANGE UP (ref 0–0.9)
MONOCYTES NFR BLD AUTO: 5.2 % — SIGNIFICANT CHANGE UP (ref 2–14)
NEUTROPHILS # BLD AUTO: 13.41 K/UL — HIGH (ref 1.8–7.4)
NEUTROPHILS NFR BLD AUTO: 85.7 % — HIGH (ref 43–77)
NITRITE UR-MCNC: NEGATIVE — SIGNIFICANT CHANGE UP
NRBC # BLD: 0 /100 WBCS — SIGNIFICANT CHANGE UP (ref 0–0)
NT-PROBNP SERPL-SCNC: 90 PG/ML — SIGNIFICANT CHANGE UP (ref 0–125)
PCO2 BLDA: 42 MMHG — SIGNIFICANT CHANGE UP (ref 32–46)
PH BLD: 7.4 — SIGNIFICANT CHANGE UP (ref 7.35–7.45)
PH UR: 7 — SIGNIFICANT CHANGE UP (ref 5–8)
PLATELET # BLD AUTO: 169 K/UL — SIGNIFICANT CHANGE UP (ref 150–400)
PO2 BLDA: 92 MMHG — SIGNIFICANT CHANGE UP (ref 74–108)
POTASSIUM SERPL-MCNC: 4.9 MMOL/L — SIGNIFICANT CHANGE UP (ref 3.5–5.3)
POTASSIUM SERPL-MCNC: 5.4 MMOL/L — HIGH (ref 3.5–5.3)
POTASSIUM SERPL-SCNC: 4.9 MMOL/L — SIGNIFICANT CHANGE UP (ref 3.5–5.3)
POTASSIUM SERPL-SCNC: 5.4 MMOL/L — HIGH (ref 3.5–5.3)
PROT SERPL-MCNC: 7.5 GM/DL — SIGNIFICANT CHANGE UP (ref 6–8.3)
PROT UR-MCNC: 30 MG/DL
PROTHROM AB SERPL-ACNC: 11.2 SEC — SIGNIFICANT CHANGE UP (ref 9.8–12.7)
RBC # BLD: 4.71 M/UL — SIGNIFICANT CHANGE UP (ref 3.8–5.2)
RBC # FLD: 14.3 % — SIGNIFICANT CHANGE UP (ref 10.3–14.5)
RBC CASTS # UR COMP ASSIST: SIGNIFICANT CHANGE UP /HPF (ref 0–4)
SAO2 % BLDA: 97 % — HIGH (ref 92–96)
SODIUM SERPL-SCNC: 138 MMOL/L — SIGNIFICANT CHANGE UP (ref 135–145)
SODIUM SERPL-SCNC: 139 MMOL/L — SIGNIFICANT CHANGE UP (ref 135–145)
SP GR SPEC: 1.01 — SIGNIFICANT CHANGE UP (ref 1.01–1.02)
TROPONIN I SERPL-MCNC: <.015 NG/ML — SIGNIFICANT CHANGE UP (ref 0.01–0.04)
TROPONIN I SERPL-MCNC: <.015 NG/ML — SIGNIFICANT CHANGE UP (ref 0.01–0.04)
UROBILINOGEN FLD QL: 4 MG/DL
WBC # BLD: 15.65 K/UL — HIGH (ref 3.8–10.5)
WBC # FLD AUTO: 15.65 K/UL — HIGH (ref 3.8–10.5)
WBC UR QL: SIGNIFICANT CHANGE UP

## 2018-07-19 PROCEDURE — 99285 EMERGENCY DEPT VISIT HI MDM: CPT

## 2018-07-19 PROCEDURE — 99223 1ST HOSP IP/OBS HIGH 75: CPT

## 2018-07-19 PROCEDURE — 93010 ELECTROCARDIOGRAM REPORT: CPT

## 2018-07-19 PROCEDURE — 71045 X-RAY EXAM CHEST 1 VIEW: CPT | Mod: 26

## 2018-07-19 RX ORDER — HEPARIN SODIUM 5000 [USP'U]/ML
5000 INJECTION INTRAVENOUS; SUBCUTANEOUS EVERY 12 HOURS
Qty: 0 | Refills: 0 | Status: DISCONTINUED | OUTPATIENT
Start: 2018-07-19 | End: 2018-07-23

## 2018-07-19 RX ORDER — DULOXETINE HYDROCHLORIDE 30 MG/1
60 CAPSULE, DELAYED RELEASE ORAL DAILY
Qty: 0 | Refills: 0 | Status: DISCONTINUED | OUTPATIENT
Start: 2018-07-19 | End: 2018-07-23

## 2018-07-19 RX ORDER — LEVOTHYROXINE SODIUM 125 MCG
125 TABLET ORAL DAILY
Qty: 0 | Refills: 0 | Status: DISCONTINUED | OUTPATIENT
Start: 2018-07-19 | End: 2018-07-23

## 2018-07-19 RX ORDER — OXYCODONE HYDROCHLORIDE 5 MG/1
5 TABLET ORAL ONCE
Qty: 0 | Refills: 0 | Status: DISCONTINUED | OUTPATIENT
Start: 2018-07-19 | End: 2018-07-19

## 2018-07-19 RX ORDER — IPRATROPIUM/ALBUTEROL SULFATE 18-103MCG
3 AEROSOL WITH ADAPTER (GRAM) INHALATION EVERY 6 HOURS
Qty: 0 | Refills: 0 | Status: DISCONTINUED | OUTPATIENT
Start: 2018-07-19 | End: 2018-07-23

## 2018-07-19 RX ORDER — GABAPENTIN 400 MG/1
400 CAPSULE ORAL EVERY 12 HOURS
Qty: 0 | Refills: 0 | Status: DISCONTINUED | OUTPATIENT
Start: 2018-07-19 | End: 2018-07-23

## 2018-07-19 RX ORDER — IPRATROPIUM/ALBUTEROL SULFATE 18-103MCG
3 AEROSOL WITH ADAPTER (GRAM) INHALATION ONCE
Qty: 0 | Refills: 0 | Status: COMPLETED | OUTPATIENT
Start: 2018-07-19 | End: 2018-07-19

## 2018-07-19 RX ORDER — OXYCODONE HYDROCHLORIDE 5 MG/1
5 TABLET ORAL EVERY 6 HOURS
Qty: 0 | Refills: 0 | Status: DISCONTINUED | OUTPATIENT
Start: 2018-07-19 | End: 2018-07-23

## 2018-07-19 RX ORDER — ATORVASTATIN CALCIUM 80 MG/1
40 TABLET, FILM COATED ORAL AT BEDTIME
Qty: 0 | Refills: 0 | Status: DISCONTINUED | OUTPATIENT
Start: 2018-07-19 | End: 2018-07-23

## 2018-07-19 RX ORDER — LURASIDONE HYDROCHLORIDE 40 MG/1
80 TABLET ORAL DAILY
Qty: 0 | Refills: 0 | Status: DISCONTINUED | OUTPATIENT
Start: 2018-07-19 | End: 2018-07-20

## 2018-07-19 RX ORDER — TIOTROPIUM BROMIDE 18 UG/1
1 CAPSULE ORAL; RESPIRATORY (INHALATION) DAILY
Qty: 0 | Refills: 0 | Status: DISCONTINUED | OUTPATIENT
Start: 2018-07-19 | End: 2018-07-23

## 2018-07-19 RX ORDER — ASPIRIN/CALCIUM CARB/MAGNESIUM 324 MG
81 TABLET ORAL DAILY
Qty: 0 | Refills: 0 | Status: DISCONTINUED | OUTPATIENT
Start: 2018-07-19 | End: 2018-07-23

## 2018-07-19 RX ADMIN — OXYCODONE HYDROCHLORIDE 5 MILLIGRAM(S): 5 TABLET ORAL at 12:56

## 2018-07-19 RX ADMIN — OXYCODONE HYDROCHLORIDE 5 MILLIGRAM(S): 5 TABLET ORAL at 19:06

## 2018-07-19 RX ADMIN — Medication 1 MILLIGRAM(S): at 13:02

## 2018-07-19 RX ADMIN — Medication 1 MILLIGRAM(S): at 19:06

## 2018-07-19 RX ADMIN — Medication 50 MILLIGRAM(S): at 15:46

## 2018-07-19 RX ADMIN — GABAPENTIN 400 MILLIGRAM(S): 400 CAPSULE ORAL at 19:07

## 2018-07-19 RX ADMIN — Medication 3 MILLILITER(S): at 11:58

## 2018-07-19 RX ADMIN — Medication 3 MILLILITER(S): at 18:14

## 2018-07-19 RX ADMIN — Medication 40 MILLIGRAM(S): at 22:38

## 2018-07-19 RX ADMIN — OXYCODONE HYDROCHLORIDE 5 MILLIGRAM(S): 5 TABLET ORAL at 15:10

## 2018-07-19 RX ADMIN — OXYCODONE HYDROCHLORIDE 5 MILLIGRAM(S): 5 TABLET ORAL at 21:21

## 2018-07-19 RX ADMIN — ATORVASTATIN CALCIUM 40 MILLIGRAM(S): 80 TABLET, FILM COATED ORAL at 22:37

## 2018-07-19 NOTE — H&P ADULT - NSHPREVIEWOFSYSTEMS_GEN_ALL_CORE
CONSTITUTIONAL: No fever, weight loss or fatigue  EYES: No eye pain, visual disturbances, or discharge  ENMT: Denies  difficulty hearing, tinnitis, vertigo, sinus or   throat pain   NECK: Denies pain or stiffness  BREAST: Denies pain, masses, or nipple discharge    RESP: C/O SOB, chest tightness and wheezing.   CV: Denies  chest pain, palpitations, dizziness, lightheadedness   GI: Denies abdominal  or epigastric pain, nausea, vomiting, hematemesis; diarrhea or changes in bowel pattern, no melena or hematochezia.  : Incontinence due to paraplegia  SKIN: Denies itching, burning, rashes or lesions  MSK:  Non Ambulatory, paraplegia.   NEURO: Denies weakness, numbness, tingling, loss of sensation, vision, headaches, memory loss  LYMPH: Denies pain or enlarged glands  ENDO: Denies heat or cold intolerances, hair loss  PSYCH: Denies depression, anxiety of SI  HEME: Denies easy brusing or bleeding gums  ALLERGY/IMMUNOLOGY: Denies hives, eczema

## 2018-07-19 NOTE — ED ADULT NURSE REASSESSMENT NOTE - GENERAL PATIENT STATE
anxious/calling out for neb treatments, very anxious, hubby at bedside
calling out, says she hasn't been changed all day, tech changed pt several times, social work states pt has a history of dilusional behavior/anxious

## 2018-07-19 NOTE — H&P ADULT - HISTORY OF PRESENT ILLNESS
· Pt is a 67 year old female with a PMH  of HTN, HLD, COPD, Asthma, Anxiety, Breast Cancer Left, Graves Disease, hyperthyroidism, Kidney cancer primary with mets, Sleep Apnea who presents to the ED with wheezing and SOB that  started this morning. Patient c/o  chest tightness, but no discrete chest pain. As per patient she was recently admitted to [facility] last week for the same symptoms.  She just completed a day course of Augmentin 875/125 mg yesterday. Feels like her usual COPD exacerbation. Has had hx of DVT in the past. Patient is non ambulatory S/P laminectomy surgery 3 years ago. Denies recent travel. No fevers, no cough, no abdominal pain.

## 2018-07-19 NOTE — ED PROVIDER NOTE - OBJECTIVE STATEMENT
Pt is a 66 yo lady with a pmhx of HTN, HL, COPD, asthma, anxiety who presents to the ED with wheezing and sob. This started this morning. Says has a chest tightness, but no discrete chest pain. Feels like her usual COPD exacerbation. Has had hx of DVT in the past. No leg swelling, no recent travel. No fevers, no cough, no abdominal pain.

## 2018-07-19 NOTE — H&P ADULT - NSHPPHYSICALEXAM_GEN_ALL_CORE
GENERAL: NAD, well-groomed, well-developed  HEAD:  Atraumatic, Normocephalic  EYES: EOMI, PERRLA, conjunctiva and sclera clear  ENMT: No tonsillar erythema, exudates, or enlargement; Moist mucous membranes,  No lesions  NECK: Supple, No JVD, Normal thyroid  NERVOUS SYSTEM:  Alert & Oriented X3, Good concentration; Motor Strength 4/5  upper extremities; Paraplegia, edema bilateral lower extremities.  CHEST/LUNG: Decreased air flow bilaterally in lung fields.   HEART: Regular rate and rhythm; No murmurs, rubs, or gallops  ABDOMEN: Soft, Nontender, Nondistended; Bowel sounds present  EXTREMITIES:  2+ Peripheral Pulses,  edema bilateral lower ext  LYMPH: No lymphadenopathy noted  SKIN: No rashes or lesions

## 2018-07-19 NOTE — H&P ADULT - ASSESSMENT
· Pt is a 67 year old female with a PMH  of HTN, HLD, COPD, Asthma, Anxiety, Breast Cancer Left, Graves Disease, hyperthyroidism, Kidney cancer primary with mets, Sleep Apnea who presents to the ED with wheezing and SOB that  started this morning. Patient c/o  chest tightness, but no discrete chest pain. As per patient she was recently admitted to [facility] last week for the same symptoms.  She just completed a day course of Augmentin 875/125 mg yesterday. Feels like her usual COPD exacerbation. Patient admitted to medical surgical unit for COPD exacerbation. Patient will require approximately 2 overnight stays for IV Solumedrol and treatment. Pulmonary consult Dr Gleason pending.	  IMPROVE VTE Individual Risk Assessment          RISK                                                          Points    [ x ] Previous VTE                                                3    [  ] Thrombophilia                                             2    [ x ] Lower limb paralysis                                    2        (unable to hold up >15 seconds)      [  ] Current Cancer                                             2         (within 6 months)    [ x ] Immobilization > 24 hrs                              1    [  ] ICU/CCU stay > 24 hours                            1    [ x ] Age > 60                                                    1    IMPROVE VTE Score _____7____    Low risk 0-1: [  ] Early ambulation                          [  ] Intermittent Compression Device    High Risk 2-12: [  ] Heparin 5,000 units SC Q8 H                              [ X ] Heparin 5,000 units SC Q 12 H                              [  ] LMWH 40 mg SC daily (CrCl > 30 ml)

## 2018-07-19 NOTE — H&P ADULT - NSHPLABSRESULTS_GEN_ALL_CORE
< from: Xray Chest 1 View AP/PA. (07.19.18 @ 12:06) >    INTERPRETATION:  Portable chest x-ray    Indication: Chest tightness.    Portable chest x-ray is compared to a previous examination dated   1/23/2018.    Impression: No evidence for pulmonary consolidation, or pleural effusion.    The lung apices are obscured by the patient's chin. No gross pneumothorax.    Stable cardiac silhouette.    Stable orthopedic hardware projecting over the cervical spine and upper   thoracic spine.      < end of copied text >

## 2018-07-19 NOTE — H&P ADULT - PROBLEM SELECTOR PLAN 1
Admit to [facility]  Pulmonary Consult pending Dr Victorino Patiño Q6H  Solumedrol IVPB Q8H

## 2018-07-19 NOTE — CONSULT NOTE ADULT - ASSESSMENT
NUNATANAELJOSETTE [facility]IGAN [facility]  [id number]   ALLERGY  nka   CONTACT  Joel Whittaker N    VISIT HISTORY 5/14-5/19/2017 [facility]   REASON FOR VISIT   Initial evaluation/Pulmonary consultation requested 7/19/2018  by Dr Silveira from Dr Gleason   Patient examined chart reviewed  HOSPITAL ADMISSION [facility] 7/19/2018    PATIENT CAME  FROM (if information available)      PATIENT DESCRIPTION 7/19/2018 ADMISSION [facility]   67 F patient with a history or localized breast CA with past tylectomy presumed to be in remission since 1997, abnormal pharmacologic cardiac stress in 11/2015, hypernephroma S/P partial nephrectomy presumed to be disease free, COPD with patient quit tobacco in 1997 (1 ppd x 25 years cigarettes), complicated course following 2 laminectomies with progressive functional decline with transient decrease in ADL, chronic pain syndrome, chronic anxiety disorder benzodiazepine dependent last admitted [facility] 5/14-5/19/2017 and was DCed to inpatient Psych care Currently bedridden since last laminectomy 2015  HOME MEDS 7/19/2018 Oxycodone 5.4 Duloxetine 60 atorvastat 40 asa 81 levoxyl 125 ativan 1.4 gabapentin 400.2 spironolactone 25 Latuda 80   HPI 7/19/2018 ADMISSION Patient admitted with SOB wheezing started 7/19/2018 am Chest tightness but no CP HO recent hosp with similar problems [facility] and sp recent course augmentin     PROBLEMS   PMH  Anxiety    COPD (chronic obstructive pulmonary disease)    DVT (deep venous thrombosis)  history of- not presently on A/C  Graves disease    Hypothyroid    Hyperlipidemia    Hypertension    Breast cancer in situ, left      Kidney cancer, primary, with metastasis from kidney to other site, left  LEft sided- s/p nephrectomy only- no chemo or RT  Obesity    Sleep apnea.  secondary to grave's disease  History of parathyroidectomy    History of pelvic surgery  Pelvic Sling  S/P breast biopsy  left  S/P cholecystectomy      S/P laminectomy  now bed and wheelchair ridden with severe pain  S/p nephrectomy  left.    CURRENT PROBLEMS 7/19/2018 ADMISSION [facility]   RESP DISTRESS  INFECTION  LEUKOCYTOSIS 7/19/2018 W 15.6   COPD EX   SLEEP APNEA   HO DVT   HYPERKALEMIA 7/19/2018 K 5.4   HO PSYCH PROBLEMS     REVIEW OF SYMPTOMS    Able to give ROS  Yes     RELIABLE No   CONSTITUTIONAL Weakness Yes  Chills No Vision changes No  ENDOCRINE No unexplained hair loss No heat or cold intolerance    ALLERGY No hives  Sore throat No   RESP Coughing blood no  Shortness of breath YES   NEURO No Headache  Confusion Pain neck No   CARDIAC No Chest pain No Palpitations   GI No Pain abdomen NO   Vomiting NO     PHYSICAL EXAM    HEENT Unremarkable PERRLA atraumatic   RESP Fair air entry EXP prolonged    Harsh breath sound Resp distres mild   CARDIAC S1 S2 No S3     NO JVD    ABDOMEN SOFT BS PRESENT NOT DISTENDED No hepatosplenomegaly PEDAL EDEMA present No calf tenderness  NO rash   GENERAL Not TOXIC looking    VITALS/LABS                           7/19/2018 afeb 90 99/56 97%   7/19/2018 W 15.6 Hb 14.9 Plt 169 Na 139 K 5.4 CO2 28 Cr .8   7/19/2018 Ddimer 217   7/19/2018 Tr 1 n.2   7/19/2018 CXR No consolidation No effsn C spine upper dorsal spine stable ortho hardware       PATIENT DATA ASSESSMENT RECOMMENDATIONS 7/19/2018 ADMISSION [facility]     RESP DISTRESS  ASSESSMENT/RECOMMENDATIONS    Monitor PO Target PO 90-95%     INFECTION  LEUKOCYTOSIS 7/19/2018 W 15.6   ASSESSMENT/RECOMMENDATIONS    7/19/2018 Check bc pct     DYSPNEA   ASSESSMENT/RECOMMENDATIONS    7/19/2018 Likely sec COPD ex Will check V duplex and ECHO     COPD EX   HO COPD with patient quit tobacco in 1997 (1 ppd x 25 years cigarettes)  Duoneb.4 (7/19)   Spiriva (7/19)   Solumed 40.3 (7/19)   ASSESSMENT/RECOMMENDATIONS    7/19/2018 Check procalcitonin to assess need for abio (had recent course augmentin)     CAD   HO abn stress test in past  7/19/2018 Vague chest pressure in HPI   7/19/2018 Ddimer 217   7/19/2018 Tr 1 n.2   ASA 81 (7/19)  Atorvastat 40 (7/19)   ASSESSMENT/RECOMMENDATIONS    7/19/2018 Monitor enz    SLEEP APNEA   ASSESSMENT/RECOMMENDATIONS    7/19/2018 Will dw pt re niv     HO DVT   7/19/2018 Ddimer 217   ASSESSMENT/RECOMMENDATIONS    7/19/2018 Check V duplex legs given ho canceer in past     HYPERKALEMIA 7/19/2018 K 5.4   ASSESSMENT/RECOMMENDATIONS    7/19/2018 monitore serially Her creat is .8 If increasing Rx with kayexalate     HYPOTHYROID  Levoxyl 125 (7/19)     PAIN   Gabapentin 400.2 (7/19)   Oxycodone 5.4p (7/19)     BZD DEPENDENT   Lorazepam1.4 (7/19)     HO PSYCH PROBLEMS   Lurasidone 80 (7/19)     GLOBAL ISSUE/BEST PRACTICE:      PROBLEM: HOB elevation:   y            PROBLEM: Stress ulcer proph:    na                      PROBLEM: VTE prophylaxis:      hsc (7/19)  PROBLEM: Glycemic control:    na  PROBLEM: Nutrition:    santana (7/19)   PROBLEM: Advanced directive: na     PROBLEM: Allergies:  na      TIME SPENT Over 55 minutes aggregate care time spent on encounter; activities included   direct patient care, counseling and/or coordinating care reviewing notes, lab data/ imaging , discussion with multidisciplinary team/ patient  /family

## 2018-07-20 LAB
CULTURE RESULTS: NO GROWTH — SIGNIFICANT CHANGE UP
SPECIMEN SOURCE: SIGNIFICANT CHANGE UP

## 2018-07-20 PROCEDURE — 99233 SBSQ HOSP IP/OBS HIGH 50: CPT

## 2018-07-20 PROCEDURE — 93970 EXTREMITY STUDY: CPT | Mod: 26

## 2018-07-20 PROCEDURE — 90792 PSYCH DIAG EVAL W/MED SRVCS: CPT

## 2018-07-20 RX ORDER — TRIHEXYPHENIDYL HCL 2 MG
2 TABLET ORAL
Qty: 0 | Refills: 0 | Status: DISCONTINUED | OUTPATIENT
Start: 2018-07-20 | End: 2018-07-23

## 2018-07-20 RX ORDER — LURASIDONE HYDROCHLORIDE 40 MG/1
1 TABLET ORAL
Qty: 0 | Refills: 0 | COMMUNITY

## 2018-07-20 RX ORDER — SENNA PLUS 8.6 MG/1
2 TABLET ORAL AT BEDTIME
Qty: 0 | Refills: 0 | Status: DISCONTINUED | OUTPATIENT
Start: 2018-07-20 | End: 2018-07-23

## 2018-07-20 RX ORDER — TRIHEXYPHENIDYL HCL 2 MG
2 TABLET ORAL
Qty: 120 | Refills: 0 | OUTPATIENT
Start: 2018-07-20 | End: 2018-08-18

## 2018-07-20 RX ORDER — SPIRONOLACTONE 25 MG/1
1 TABLET, FILM COATED ORAL
Qty: 0 | Refills: 0 | COMMUNITY

## 2018-07-20 RX ORDER — CARVEDILOL PHOSPHATE 80 MG/1
12.5 CAPSULE, EXTENDED RELEASE ORAL EVERY 12 HOURS
Qty: 0 | Refills: 0 | Status: DISCONTINUED | OUTPATIENT
Start: 2018-07-20 | End: 2018-07-23

## 2018-07-20 RX ORDER — IPRATROPIUM/ALBUTEROL SULFATE 18-103MCG
3 AEROSOL WITH ADAPTER (GRAM) INHALATION
Qty: 0 | Refills: 0 | Status: DISCONTINUED | OUTPATIENT
Start: 2018-07-20 | End: 2018-07-23

## 2018-07-20 RX ORDER — GABAPENTIN 400 MG/1
0 CAPSULE ORAL
Qty: 0 | Refills: 0 | COMMUNITY

## 2018-07-20 RX ORDER — LORATADINE 10 MG/1
10 TABLET ORAL DAILY
Qty: 0 | Refills: 0 | Status: DISCONTINUED | OUTPATIENT
Start: 2018-07-20 | End: 2018-07-23

## 2018-07-20 RX ORDER — POLYETHYLENE GLYCOL 3350 17 G/17G
17 POWDER, FOR SOLUTION ORAL DAILY
Qty: 0 | Refills: 0 | Status: DISCONTINUED | OUTPATIENT
Start: 2018-07-20 | End: 2018-07-23

## 2018-07-20 RX ORDER — MELOXICAM 15 MG/1
1 TABLET ORAL
Qty: 0 | Refills: 0 | COMMUNITY

## 2018-07-20 RX ORDER — LOSARTAN POTASSIUM 100 MG/1
100 TABLET, FILM COATED ORAL DAILY
Qty: 0 | Refills: 0 | Status: DISCONTINUED | OUTPATIENT
Start: 2018-07-20 | End: 2018-07-23

## 2018-07-20 RX ORDER — AMLODIPINE BESYLATE 2.5 MG/1
2.5 TABLET ORAL DAILY
Qty: 0 | Refills: 0 | Status: DISCONTINUED | OUTPATIENT
Start: 2018-07-20 | End: 2018-07-23

## 2018-07-20 RX ORDER — CELECOXIB 200 MG/1
200 CAPSULE ORAL DAILY
Qty: 0 | Refills: 0 | Status: DISCONTINUED | OUTPATIENT
Start: 2018-07-20 | End: 2018-07-23

## 2018-07-20 RX ORDER — AMLODIPINE BESYLATE 2.5 MG/1
5 TABLET ORAL DAILY
Qty: 0 | Refills: 0 | Status: DISCONTINUED | OUTPATIENT
Start: 2018-07-20 | End: 2018-07-20

## 2018-07-20 RX ORDER — FOLIC ACID 0.8 MG
1 TABLET ORAL DAILY
Qty: 0 | Refills: 0 | Status: DISCONTINUED | OUTPATIENT
Start: 2018-07-20 | End: 2018-07-23

## 2018-07-20 RX ORDER — DOCUSATE SODIUM 100 MG
100 CAPSULE ORAL
Qty: 0 | Refills: 0 | Status: DISCONTINUED | OUTPATIENT
Start: 2018-07-20 | End: 2018-07-23

## 2018-07-20 RX ADMIN — Medication 40 MILLIGRAM(S): at 21:57

## 2018-07-20 RX ADMIN — Medication 1 MILLIGRAM(S): at 11:19

## 2018-07-20 RX ADMIN — OXYCODONE HYDROCHLORIDE 5 MILLIGRAM(S): 5 TABLET ORAL at 05:28

## 2018-07-20 RX ADMIN — OXYCODONE HYDROCHLORIDE 5 MILLIGRAM(S): 5 TABLET ORAL at 13:09

## 2018-07-20 RX ADMIN — GABAPENTIN 400 MILLIGRAM(S): 400 CAPSULE ORAL at 05:31

## 2018-07-20 RX ADMIN — OXYCODONE HYDROCHLORIDE 5 MILLIGRAM(S): 5 TABLET ORAL at 13:39

## 2018-07-20 RX ADMIN — Medication 2 MILLIGRAM(S): at 18:38

## 2018-07-20 RX ADMIN — LORATADINE 10 MILLIGRAM(S): 10 TABLET ORAL at 18:38

## 2018-07-20 RX ADMIN — OXYCODONE HYDROCHLORIDE 5 MILLIGRAM(S): 5 TABLET ORAL at 21:59

## 2018-07-20 RX ADMIN — Medication 3 MILLILITER(S): at 00:40

## 2018-07-20 RX ADMIN — Medication 40 MILLIGRAM(S): at 05:32

## 2018-07-20 RX ADMIN — Medication 3 MILLILITER(S): at 05:09

## 2018-07-20 RX ADMIN — OXYCODONE HYDROCHLORIDE 5 MILLIGRAM(S): 5 TABLET ORAL at 20:12

## 2018-07-20 RX ADMIN — Medication 81 MILLIGRAM(S): at 11:19

## 2018-07-20 RX ADMIN — Medication 40 MILLIGRAM(S): at 13:08

## 2018-07-20 RX ADMIN — Medication 3 MILLILITER(S): at 11:01

## 2018-07-20 RX ADMIN — Medication 3 MILLILITER(S): at 23:29

## 2018-07-20 RX ADMIN — Medication 1 MILLIGRAM(S): at 17:04

## 2018-07-20 RX ADMIN — Medication 1 MILLIGRAM(S): at 05:30

## 2018-07-20 RX ADMIN — Medication 125 MICROGRAM(S): at 05:30

## 2018-07-20 RX ADMIN — Medication 3 MILLILITER(S): at 17:17

## 2018-07-20 RX ADMIN — ATORVASTATIN CALCIUM 40 MILLIGRAM(S): 80 TABLET, FILM COATED ORAL at 21:56

## 2018-07-20 RX ADMIN — CARVEDILOL PHOSPHATE 12.5 MILLIGRAM(S): 80 CAPSULE, EXTENDED RELEASE ORAL at 18:38

## 2018-07-20 RX ADMIN — AMLODIPINE BESYLATE 5 MILLIGRAM(S): 2.5 TABLET ORAL at 13:07

## 2018-07-20 RX ADMIN — DULOXETINE HYDROCHLORIDE 60 MILLIGRAM(S): 30 CAPSULE, DELAYED RELEASE ORAL at 11:19

## 2018-07-20 RX ADMIN — OXYCODONE HYDROCHLORIDE 5 MILLIGRAM(S): 5 TABLET ORAL at 06:28

## 2018-07-20 RX ADMIN — Medication 1 MILLIGRAM(S): at 01:24

## 2018-07-20 RX ADMIN — HEPARIN SODIUM 5000 UNIT(S): 5000 INJECTION INTRAVENOUS; SUBCUTANEOUS at 05:30

## 2018-07-20 RX ADMIN — GABAPENTIN 400 MILLIGRAM(S): 400 CAPSULE ORAL at 17:04

## 2018-07-20 RX ADMIN — HEPARIN SODIUM 5000 UNIT(S): 5000 INJECTION INTRAVENOUS; SUBCUTANEOUS at 17:04

## 2018-07-20 NOTE — BEHAVIORAL HEALTH ASSESSMENT NOTE - HPI (INCLUDE ILLNESS QUALITY, SEVERITY, DURATION, TIMING, CONTEXT, MODIFYING FACTORS, ASSOCIATED SIGNS AND SYMPTOMS)
Pt is a 66   female, living with her  and developmentally disable adult son, with psychiatric hx of depression, anxiety, psychosis and prior hospitalizations (most recently at [facility] in 11/2015), and extensive medical hx, including asthma, COPD (quit smoking in 1997), HTN, HLD, Grave's disease, hypothyroidism, breast CA, kidney CA, cholecystectemy, laminectemy (difficulty ambulating as result of complications from the procedure) consulted for psych history and reported h/o noncompliance with meds.   As per prior psych eval, pt is followed by a private psychiatrist, Dr. De La Paz and is reportedly compliant with her meds.     Panfilo To  [phone] Pt is a 66   female, living with her  and developmentally disabled adult son, with psychiatric hx of depression, anxiety, psychosis and prior hospitalizations (most recently at [facility] in 11/2015), and extensive medical hx, including asthma, COPD (quit smoking in 1997), HTN, HLD, Grave's disease, hypothyroidism, breast CA, kidney CA, cholecystectemy, laminectomy (difficulty ambulating as result of complications from the procedure) consulted for psych history and reported h/o noncompliance with meds.   As per prior psych eval, pt is followed by a private psychiatrist, Dr. De La Paz and is reportedly compliant with her meds.     Patient was annoyed that psychiatry was called "I don't need a psychiatrist I need to be able to breathe!" denied any acute issues including mood changes, sleep disturbance, SI, HI, paranoia, hallucinations. Anxious due to not being able to breathe or eat "I feel like I'm choking on food". Adamant about leaving tomorrow, irrespective of what the doctors say. Refuses to give out contact for outpatient psych, does not give permission for writer to ask family about her condition and recent status. Pt is a 66   female, living with her  and developmentally disabled adult son, with psychiatric hx of depression, anxiety, psychosis and prior hospitalizations (most recently at [facility] in 11/2015), and extensive medical hx, including asthma, COPD (quit smoking in 1997), HTN, HLD, Grave's disease, hypothyroidism, breast CA, kidney CA, cholecystectemy, laminectomy (difficulty ambulating as result of complications from the procedure) consulted for psych history and reported h/o noncompliance with meds.   As per prior psych eval, pt is followed by a private psychiatrist, Dr. De La Paz and is reportedly compliant with her meds.     Patient was annoyed that psychiatry was called "I don't need a psychiatrist I need to be able to breathe!" denied any acute issues including mood changes, sleep disturbance, SI, HI, paranoia, hallucinations. Anxious due to not being able to breathe or eat "I feel like I'm choking on food". Adamant about leaving tomorrow, irrespective of what the doctors say. Refuses to give out contact for outpatient psych, does not give permission for writer to ask family about her condition and recent status. Reports being on Ativan only [also on Cymbalta, Neurontin]    Pt seen by ER last year for psychosis, was T&R.

## 2018-07-20 NOTE — PROGRESS NOTE ADULT - PROBLEM SELECTOR PLAN 1
Admit to [facility]  Pulmonary Consult Dr Gleason appreciated   Duoneb Q6H  Solumedrol IVPB Q8H

## 2018-07-20 NOTE — BEHAVIORAL HEALTH ASSESSMENT NOTE - NSBHMEDSOTHERFT_PSY_A_CORE
Ativan, Cymbalta, Risperdal Ativan 1mg QID as per pt Ativan 1mg QID only as per pt, Cymbalta 60mg also ordered as well as Neurontin

## 2018-07-20 NOTE — BEHAVIORAL HEALTH ASSESSMENT NOTE - SUMMARY
Pt is a 66   female, living with her  and developmentally disabled adult son, with psychiatric hx of depression, anxiety, psychosis and prior hospitalizations (most recently at [facility] in 11/2015), and extensive medical hx, including asthma, COPD (quit smoking in 1997), HTN, HLD, Grave's disease, hypothyroidism, breast CA, kidney CA, cholecystectemy, laminectomy (difficulty ambulating as result of complications from the procedure) consulted for psych history and reported h/o noncompliance with meds. pt was only partially cooperative with interview and refused for writer to talk to psychiatrist /family. Denied acute psych issues, denied SI, HI. Anxiety and SOB feed forward cycle, however pt adamant about leaving today irrespective of her medical condition which indicates somewhat impaired judgment. Pt is a 66   female, living with her  and developmentally disabled adult son, with psychiatric hx of depression, anxiety, psychosis and prior hospitalizations (most recently at [facility] in 11/2015), and extensive medical hx, including asthma, COPD (quit smoking in 1997), HTN, HLD, Grave's disease, hypothyroidism, breast CA, kidney CA, cholecystectemy, laminectomy (difficulty ambulating as result of complications from the procedure) consulted for psych history and reported h/o noncompliance with meds. pt was only partially cooperative with interview and refused for writer to talk to psychiatrist /family. Denied acute psych issues, denied SI, HI. Anxiety and SOB feed forward cycle, however pt adamant about leaving today irrespective of her medical condition which indicates somewhat impaired judgment. Diagnosis unclear given noncooperative with eval today, no further info can be gathered from psych or family

## 2018-07-20 NOTE — PATIENT PROFILE ADULT. - VISION (WITH CORRECTIVE LENSES IF THE PATIENT USUALLY WEARS THEM):
wears glassess/Normal vision: sees adequately in most situations; can see medication labels, newsprint

## 2018-07-20 NOTE — PROGRESS NOTE ADULT - SUBJECTIVE AND OBJECTIVE BOX
[facility]  [id number]   ALLERGY  nka   CONTACT  Sp Panfilo N    VISIT HISTORY 5/14-5/19/2017 [facility]   REASON FOR VISIT   Initial evaluation/Pulmonary consultation requested 7/19/2018  by Dr Silveira from Dr Gleason   Patient examined chart reviewed  HOSPITAL ADMISSION [facility] 7/19/2018    PATIENT CAME  FROM (if information available)      CURRENT PROBLEMS 7/19/2018 ADMISSION [facility]   RESP DISTRESS  INFECTION  LEUKOCYTOSIS 7/19/2018 W 15.6   COPD EX   SLEEP APNEA   HO DVT   HYPERKALEMIA 7/19/2018 K 5.4   HO PSYCH PROBLEMS     REVIEW OF SYMPTOMS    Able to give ROS  Yes     RELIABLE No   CONSTITUTIONAL Weakness Yes  Chills No Vision changes No  ENDOCRINE No unexplained hair loss No heat or cold intolerance    ALLERGY No hives  Sore throat No   RESP Coughing blood no  Shortness of breath YES   NEURO No Headache  Confusion Pain neck No   CARDIAC No Chest pain No Palpitations   GI No Pain abdomen NO   Vomiting NO     PHYSICAL EXAM    HEENT Unremarkable PERRLA atraumatic   RESP Fair air entry EXP prolonged    Harsh breath sound Resp distres mild   CARDIAC S1 S2 No S3     NO JVD    ABDOMEN SOFT BS PRESENT NOT DISTENDED No hepatosplenomegaly PEDAL EDEMA present No calf tenderness  NO rash   GENERAL Not TOXIC looking    VITALS/LABS                           7/20/2018 afeb 76 146/84 18 95%   7/19/2018 afeb 90 99/56 97%   7/19/2018 W 15.6 Hb 14.9 Plt 169 Na 139 K 5.4 CO2 28 Cr .8     PATIENT DATA ASSESSMENT RECOMMENDATIONS 7/19/2018 ADMISSION [facility]     RESP DISTRESS  7/19/2018 .28 740/42/92   ASSESSMENT/RECOMMENDATIONS    Monitor PO Target PO 90-95%     INFECTION  LEUKOCYTOSIS 7/19/2018 W 15.6   ASSESSMENT/RECOMMENDATIONS    7/19/2018 Check bc pct     DYSPNEA    7/20/2018 V duplex legs n  ASSESSMENT/RECOMMENDATIONS    7/19/2018 Likely sec COPD ex Will check ECHO     COPD EX   HO COPD with patient quit tobacco in 1997 (1 ppd x 25 years cigarettes)  7/19/2018 Has home neb Does not have home O2   Duoneb.4 (7/19)   Spiriva (7/19)   Solumed 40.3 (7/19)   ASSESSMENT/RECOMMENDATIONS    7/19/2018 Check procalcitonin to assess need for abio (had recent course augmentin)     CAD   HO abn stress test in past  7/19/2018 Vague chest pressure in HPI   7/19/2018 Ddimer 217   7/19/2018 Tr 1 n.2   ASA 81 (7/19)  Atorvastat 40 (7/19)   ASSESSMENT/RECOMMENDATIONS    7/19/2018 Monitor enz    SLEEP APNEA   7/19/2018 Pt used to have CAPAP but they took it away as she was not using it enough  ASSESSMENT/RECOMMENDATIONS    7/20/2018 Pt will see me as outpt and will arrange PSG     HO DVT   HO DVT R leg 3 y ago postop laminectomy   7/19/2018 Ddimer 217   ASSESSMENT/RECOMMENDATIONS    7/20/2018 V duplex legs n    HYPERKALEMIA 7/19/2018 K 5.4   ASSESSMENT/RECOMMENDATIONS    7/19/2018 monitore serially Her creat is .8 If increasing Rx with kayexalate     HYPOTHYROID  Levoxyl 125 (7/19)     PAIN   Gabapentin 400.2 (7/19)   Oxycodone 5.4p (7/19)     BZD DEPENDENT   Lorazepam1.4 (7/19)     HO PSYCH PROBLEMS   Lurasidone 80 (7/19)     GLOBAL ISSUE/BEST PRACTICE:      PROBLEM: HOB elevation:   y            PROBLEM: Stress ulcer proph:    na                      PROBLEM: VTE prophylaxis:      hsc (7/19)  PROBLEM: Glycemic control:    na  PROBLEM: Nutrition:    santana (7/19)   PROBLEM: Advanced directive: na     PROBLEM: Allergies:  na      TIME SPENT Over 25 minutes aggregate care time spent on encounter; activities included   direct patient care, counseling and/or coordinating care reviewing notes, lab data/ imaging , discussion with multidisciplinary team/ patient  /family

## 2018-07-20 NOTE — BEHAVIORAL HEALTH ASSESSMENT NOTE - AXIS III
asthma, COPD (quit smoking in 1997), HTN, HLD, Grave's disease, hypothyroidism, breast CA, kidney CA, cholecystectemy, laminectomy (difficulty ambulating as result of complications from the procedure)

## 2018-07-20 NOTE — BEHAVIORAL HEALTH ASSESSMENT NOTE - RISK ASSESSMENT
chronically elevated given psych history and prior trials, however no known h/o SI/SIB/SA or violence, acute risk is anxiety, medical problems, ?benzodiazepine dependence. Protective factors are no SI/HI, good family support, pt was noted to be future oriented and with stable affect, no evidence of psychosis /dov/MDD, denied sleep difficulty, substance use or legal issues.

## 2018-07-20 NOTE — PROGRESS NOTE ADULT - SUBJECTIVE AND OBJECTIVE BOX
CHIEF COMPLAINT/INTERVAL HISTORY:    Patient is a 67y old  Female who presents with a chief complaint of Chest tightness and Wheezing (2018 17:48)      HPI:  · Pt is a 67 year old female with a PMH  of HTN, HLD, COPD, Asthma, Anxiety, Breast Cancer Left, Graves Disease, hyperthyroidism, Kidney cancer primary with mets, Sleep Apnea who presents to the ED with wheezing and SOB that  started this morning. Patient c/o  chest tightness, but no discrete chest pain. As per patient she was recently admitted to [facility] last week for the same symptoms.  She just completed a day course of Augmentin 875/125 mg yesterday. Feels like her usual COPD exacerbation. Has had hx of DVT in the past. Patient is non ambulatory S/P laminectomy surgery 3 years ago. Denies recent travel. No fevers, no cough, no abdominal pain. (2018 17:48)    Overnight issues  very anxious  tightness improved in her chest but now a 6/10    will bring in blood pressure meds  SUBJECTIVE & OBJECTIVE: Pt seen and examined at bedside.   ROS:  CONSTITUTIONAL: No fever, weight loss, or fatigue  NECK: No pain or stiffness  RESPIRATORY: chest tightness short of breath and non productive cough   CARDIOVASCULAR: No chest pain, palpitations, dizziness, or leg swelling  GASTROINTESTINAL: No abdominal or epigastric pain. No nausea, vomiting, or hematemesis; No diarrhea or constipation. No melena or hematochezia.  GENITOURINARY: No dysuria, frequency, hematuria, or incontinence  NEUROLOGICAL: No headaches, memory loss, loss of strength, numbness, or tremors  SKIN: No itching, burning, rashes, or lesions   ICU Vital Signs Last 24 Hrs  T(C): 37 (2018 11:27), Max: 37 (2018 05:30)  T(F): 98.6 (2018 11:27), Max: 98.6 (2018 05:30)  HR: 76 (2018 11:) (76 - 90)  BP: 146/84 (2018 11:27) (99/56 - 149/65)  BP(mean): --  ABP: --  ABP(mean): --  RR: 18 (2018 11:27) (18 - 20)  SpO2: 95% (2018 11:) (95% - 97%)        MEDICATIONS  (STANDING):  ALBUTerol/ipratropium for Nebulization 3 milliLiter(s) Nebulizer every 6 hours  amLODIPine   Tablet 5 milliGRAM(s) Oral daily  aspirin  chewable 81 milliGRAM(s) Oral daily  atorvastatin 40 milliGRAM(s) Oral at bedtime  DULoxetine 60 milliGRAM(s) Oral daily  gabapentin 400 milliGRAM(s) Oral every 12 hours  heparin  Injectable 5000 Unit(s) SubCutaneous every 12 hours  levothyroxine 125 MICROGram(s) Oral daily  LORazepam     Tablet 1 milliGRAM(s) Oral every 6 hours  methylPREDNISolone sodium succinate Injectable 40 milliGRAM(s) IV Push every 8 hours  tiotropium 18 MICROgram(s) Capsule 1 Capsule(s) Inhalation daily    MEDICATIONS  (PRN):  oxyCODONE    IR 5 milliGRAM(s) Oral every 6 hours PRN Moderate Pain (4 - 6)        PHYSICAL EXAM:    GENERAL: NAD, well-groomed, well-developed  HEAD:  Atraumatic, Normocephalic  EYES: EOMI, PERRLA, conjunctiva and sclera clear  ENMT: Moist mucous membranes  NECK: Supple, No JVD  NERVOUS SYSTEM:  Alert & Oriented X3, Motor Strength 5/5 B/L upper and lower extremities; DTRs 2+ intact and symmetric  CHEST/LUNG: Clear to auscultation bilaterally; No rales, rhonchi, wheezing, or rubs  HEART: Regular rate and rhythm; No murmurs, rubs, or gallops  ABDOMEN: Soft, Nontender, Nondistended; Bowel sounds present  EXTREMITIES:  2+ Peripheral Pulses, No clubbing, cyanosis, or edema    LABS:                        14.9   15.65 )-----------( 169      ( 2018 11:01 )             45.2     07-    138  |  102  |  30<H>  ----------------------------<  180<H>  4.9   |  27  |  1.01    Ca    9.9      2018 21:54    TPro  7.5  /  Alb  3.4  /  TBili  1.1  /  DBili  x   /  AST  28  /  ALT  45  /  AlkPhos  105  07-19    PT/INR - ( 2018 11:01 )   PT: 11.2 sec;   INR: 1.03 ratio         PTT - ( 2018 11:01 )  PTT:31.8 sec  Urinalysis Basic - ( 2018 11:23 )    Color: Yellow / Appearance: Clear / S.010 / pH: x  Gluc: x / Ketone: Negative  / Bili: Negative / Urobili: 4 mg/dL   Blood: x / Protein: 30 mg/dL / Nitrite: Negative   Leuk Esterase: Trace / RBC: 0-2 /HPF / WBC 0-2   Sq Epi: x / Non Sq Epi: Occasional / Bacteria: Few        CAPILLARY BLOOD GLUCOSE          RECENT CULTURES:      RADIOLOGY & ADDITIONAL TESTS:  Imaging Personally Reviewed:  [ ] YES      Consultant(s) Notes Reviewed:  [ ] YES     Care Discussed with [ ] Consultants [X ] Patient [ ] Family  [x ]    [x ]  Other; RN  HEALTH ISSUES - PROBLEM Dx:  Preventive measure: Preventive measure  Kidney cancer, primary, with metastasis from kidney to other site, left: Kidney cancer, primary, with metastasis from kidney to other site, left  Hypothyroidism, unspecified type: Hypothyroidism, unspecified type  Anxiety: Anxiety  Hypertension, unspecified type: Hypertension, unspecified type  Hyperlipidemia, unspecified hyperlipidemia type: Hyperlipidemia, unspecified hyperlipidemia type  Hyperkalemia: Hyperkalemia  COPD exacerbation: COPD exacerbation        DVT/GI ppx  Discussed with pt @ bedside

## 2018-07-20 NOTE — BEHAVIORAL HEALTH ASSESSMENT NOTE - NSBHCONSULTMEDS_PSY_A_CORE FT
c/w Ativan 1mg QID for now although not safe given age and respiratory problems would have to be tapered as outpatient over the course of months c/w with current regimen including Ativan 1mg QID for now although not safe given age and respiratory problems would have to be tapered as outpatient over the course of months

## 2018-07-20 NOTE — PROGRESS NOTE ADULT - ASSESSMENT
· Pt is a 67 year old female with a PMH  of HTN, HLD, COPD, Asthma, Anxiety, Breast Cancer Left, Graves Disease, hyperthyroidism, Kidney cancer primary with mets, Sleep Apnea who presents to the ED with wheezing and SOB that  started this morning. Patient c/o  chest tightness, but no discrete chest pain. As per patient she was recently admitted to [facility] last week for the same symptoms.  She just completed a day course of Augmentin 875/125 mg yesterday. Feels like her usual COPD exacerbation. Patient admitted to medical surgical unit for COPD exacerbation. Patient will require approximately 2 overnight stays for IV Solumedrol and treatment. Pulmonary consult Dr Gleason pending.	  IMPROVE VTE Individual Risk Assessment          RISK                                                          Points    [ x ] Previous VTE                                                3    [  ] Thrombophilia                                             2    [ x ] Lower limb paralysis                                    2        (unable to hold up >15 seconds)      [  ] Current Cancer                                             2         (within 6 months)    [ x ] Immobilization > 24 hrs                              1    [  ] ICU/CCU stay > 24 hours                            1    [ x ] Age > 60                                                    1    IMPROVE VTE Score _____7____    Low risk 0-1: [  ] Early ambulation                          [  ] Intermittent Compression Device    High Risk 2-12: [  ] Heparin 5,000 units SC Q8 H                              [ X ] Heparin 5,000 units SC Q 12 H                              [  ] LMWH 40 mg SC daily (CrCl > 30 ml)

## 2018-07-20 NOTE — BEHAVIORAL HEALTH ASSESSMENT NOTE - DETAILS
Pt's sister has depression as per chart sister with depression as per chart difficulty breathing difficulty eating, possibly linked to SOB

## 2018-07-21 LAB
ALBUMIN SERPL ELPH-MCNC: 3.4 G/DL — SIGNIFICANT CHANGE UP (ref 3.3–5)
ALP SERPL-CCNC: 96 U/L — SIGNIFICANT CHANGE UP (ref 40–120)
ALT FLD-CCNC: 45 U/L — SIGNIFICANT CHANGE UP (ref 12–78)
ANION GAP SERPL CALC-SCNC: 9 MMOL/L — SIGNIFICANT CHANGE UP (ref 5–17)
AST SERPL-CCNC: 14 U/L — LOW (ref 15–37)
BILIRUB SERPL-MCNC: 0.9 MG/DL — SIGNIFICANT CHANGE UP (ref 0.2–1.2)
BUN SERPL-MCNC: 22 MG/DL — SIGNIFICANT CHANGE UP (ref 7–23)
CALCIUM SERPL-MCNC: 9.9 MG/DL — SIGNIFICANT CHANGE UP (ref 8.5–10.1)
CHLORIDE SERPL-SCNC: 104 MMOL/L — SIGNIFICANT CHANGE UP (ref 96–108)
CO2 SERPL-SCNC: 27 MMOL/L — SIGNIFICANT CHANGE UP (ref 22–31)
CREAT SERPL-MCNC: 0.78 MG/DL — SIGNIFICANT CHANGE UP (ref 0.5–1.3)
GLUCOSE SERPL-MCNC: 139 MG/DL — HIGH (ref 70–99)
HCT VFR BLD CALC: 46.4 % — HIGH (ref 34.5–45)
HGB BLD-MCNC: 15 G/DL — SIGNIFICANT CHANGE UP (ref 11.5–15.5)
INR BLD: 1.04 RATIO — SIGNIFICANT CHANGE UP (ref 0.88–1.16)
MAGNESIUM SERPL-MCNC: 2.4 MG/DL — SIGNIFICANT CHANGE UP (ref 1.6–2.6)
MCHC RBC-ENTMCNC: 31.2 PG — SIGNIFICANT CHANGE UP (ref 27–34)
MCHC RBC-ENTMCNC: 32.3 GM/DL — SIGNIFICANT CHANGE UP (ref 32–36)
MCV RBC AUTO: 96.5 FL — SIGNIFICANT CHANGE UP (ref 80–100)
NRBC # BLD: 0 /100 WBCS — SIGNIFICANT CHANGE UP (ref 0–0)
PHOSPHATE SERPL-MCNC: 2.9 MG/DL — SIGNIFICANT CHANGE UP (ref 2.5–4.5)
PLATELET # BLD AUTO: 168 K/UL — SIGNIFICANT CHANGE UP (ref 150–400)
POTASSIUM SERPL-MCNC: 4.6 MMOL/L — SIGNIFICANT CHANGE UP (ref 3.5–5.3)
POTASSIUM SERPL-SCNC: 4.6 MMOL/L — SIGNIFICANT CHANGE UP (ref 3.5–5.3)
PROCALCITONIN SERPL-MCNC: 0.02 NG/ML — SIGNIFICANT CHANGE UP (ref 0.02–0.1)
PROT SERPL-MCNC: 7.3 GM/DL — SIGNIFICANT CHANGE UP (ref 6–8.3)
PROTHROM AB SERPL-ACNC: 11.3 SEC — SIGNIFICANT CHANGE UP (ref 9.8–12.7)
RBC # BLD: 4.81 M/UL — SIGNIFICANT CHANGE UP (ref 3.8–5.2)
RBC # FLD: 14.1 % — SIGNIFICANT CHANGE UP (ref 10.3–14.5)
SODIUM SERPL-SCNC: 140 MMOL/L — SIGNIFICANT CHANGE UP (ref 135–145)
WBC # BLD: 13.54 K/UL — HIGH (ref 3.8–10.5)
WBC # FLD AUTO: 13.54 K/UL — HIGH (ref 3.8–10.5)

## 2018-07-21 PROCEDURE — 99233 SBSQ HOSP IP/OBS HIGH 50: CPT

## 2018-07-21 RX ORDER — BUDESONIDE AND FORMOTEROL FUMARATE DIHYDRATE 160; 4.5 UG/1; UG/1
2 AEROSOL RESPIRATORY (INHALATION)
Qty: 0 | Refills: 0 | Status: DISCONTINUED | OUTPATIENT
Start: 2018-07-21 | End: 2018-07-23

## 2018-07-21 RX ORDER — IPRATROPIUM/ALBUTEROL SULFATE 18-103MCG
3 AEROSOL WITH ADAPTER (GRAM) INHALATION ONCE
Qty: 0 | Refills: 0 | Status: COMPLETED | OUTPATIENT
Start: 2018-07-21 | End: 2018-07-21

## 2018-07-21 RX ADMIN — Medication 3 MILLILITER(S): at 10:09

## 2018-07-21 RX ADMIN — Medication 3 MILLILITER(S): at 11:13

## 2018-07-21 RX ADMIN — LORATADINE 10 MILLIGRAM(S): 10 TABLET ORAL at 11:48

## 2018-07-21 RX ADMIN — DULOXETINE HYDROCHLORIDE 60 MILLIGRAM(S): 30 CAPSULE, DELAYED RELEASE ORAL at 11:49

## 2018-07-21 RX ADMIN — BUDESONIDE AND FORMOTEROL FUMARATE DIHYDRATE 2 PUFF(S): 160; 4.5 AEROSOL RESPIRATORY (INHALATION) at 16:57

## 2018-07-21 RX ADMIN — HEPARIN SODIUM 5000 UNIT(S): 5000 INJECTION INTRAVENOUS; SUBCUTANEOUS at 16:59

## 2018-07-21 RX ADMIN — OXYCODONE HYDROCHLORIDE 5 MILLIGRAM(S): 5 TABLET ORAL at 20:45

## 2018-07-21 RX ADMIN — CELECOXIB 200 MILLIGRAM(S): 200 CAPSULE ORAL at 12:19

## 2018-07-21 RX ADMIN — OXYCODONE HYDROCHLORIDE 5 MILLIGRAM(S): 5 TABLET ORAL at 06:56

## 2018-07-21 RX ADMIN — Medication 125 MICROGRAM(S): at 05:22

## 2018-07-21 RX ADMIN — CARVEDILOL PHOSPHATE 12.5 MILLIGRAM(S): 80 CAPSULE, EXTENDED RELEASE ORAL at 16:58

## 2018-07-21 RX ADMIN — Medication 2 MILLIGRAM(S): at 05:22

## 2018-07-21 RX ADMIN — CARVEDILOL PHOSPHATE 12.5 MILLIGRAM(S): 80 CAPSULE, EXTENDED RELEASE ORAL at 05:24

## 2018-07-21 RX ADMIN — Medication 3 MILLILITER(S): at 20:12

## 2018-07-21 RX ADMIN — OXYCODONE HYDROCHLORIDE 5 MILLIGRAM(S): 5 TABLET ORAL at 21:45

## 2018-07-21 RX ADMIN — ATORVASTATIN CALCIUM 40 MILLIGRAM(S): 80 TABLET, FILM COATED ORAL at 20:47

## 2018-07-21 RX ADMIN — OXYCODONE HYDROCHLORIDE 5 MILLIGRAM(S): 5 TABLET ORAL at 06:00

## 2018-07-21 RX ADMIN — GABAPENTIN 400 MILLIGRAM(S): 400 CAPSULE ORAL at 05:22

## 2018-07-21 RX ADMIN — OXYCODONE HYDROCHLORIDE 5 MILLIGRAM(S): 5 TABLET ORAL at 14:53

## 2018-07-21 RX ADMIN — Medication 3 MILLILITER(S): at 17:20

## 2018-07-21 RX ADMIN — LOSARTAN POTASSIUM 100 MILLIGRAM(S): 100 TABLET, FILM COATED ORAL at 05:22

## 2018-07-21 RX ADMIN — Medication 3 MILLILITER(S): at 05:30

## 2018-07-21 RX ADMIN — Medication 100 MILLIGRAM(S): at 16:58

## 2018-07-21 RX ADMIN — CELECOXIB 200 MILLIGRAM(S): 200 CAPSULE ORAL at 13:00

## 2018-07-21 RX ADMIN — AMLODIPINE BESYLATE 2.5 MILLIGRAM(S): 2.5 TABLET ORAL at 05:23

## 2018-07-21 RX ADMIN — Medication 40 MILLIGRAM(S): at 05:23

## 2018-07-21 RX ADMIN — Medication 1 MILLIGRAM(S): at 11:53

## 2018-07-21 RX ADMIN — Medication 1 MILLIGRAM(S): at 17:03

## 2018-07-21 RX ADMIN — Medication 1 MILLIGRAM(S): at 00:42

## 2018-07-21 RX ADMIN — Medication 81 MILLIGRAM(S): at 11:49

## 2018-07-21 RX ADMIN — Medication 1 MILLIGRAM(S): at 23:19

## 2018-07-21 RX ADMIN — BUDESONIDE AND FORMOTEROL FUMARATE DIHYDRATE 2 PUFF(S): 160; 4.5 AEROSOL RESPIRATORY (INHALATION) at 12:19

## 2018-07-21 RX ADMIN — Medication 1 MILLIGRAM(S): at 05:22

## 2018-07-21 RX ADMIN — Medication 40 MILLIGRAM(S): at 16:59

## 2018-07-21 RX ADMIN — Medication 3 MILLILITER(S): at 23:28

## 2018-07-21 RX ADMIN — Medication 2 MILLIGRAM(S): at 16:57

## 2018-07-21 RX ADMIN — HEPARIN SODIUM 5000 UNIT(S): 5000 INJECTION INTRAVENOUS; SUBCUTANEOUS at 05:24

## 2018-07-21 RX ADMIN — GABAPENTIN 400 MILLIGRAM(S): 400 CAPSULE ORAL at 17:02

## 2018-07-21 NOTE — PROGRESS NOTE ADULT - SUBJECTIVE AND OBJECTIVE BOX
Patient is a 67y old  Female who presents with a chief complaint of Chest tightness and Wheezing (19 Jul 2018 17:48)      INTERVAL HPI/ OVERNIGHT EVENTS: Pt was seen and examined at bedside today, No significant overnight events, pt admits to cough, wheezing and SOB but admits that she is feeling better since admission.      MEDICATIONS  (STANDING):  ALBUTerol/ipratropium for Nebulization 3 milliLiter(s) Nebulizer every 6 hours  amLODIPine   Tablet 2.5 milliGRAM(s) Oral daily  aspirin  chewable 81 milliGRAM(s) Oral daily  atorvastatin 40 milliGRAM(s) Oral at bedtime  buDESOnide  80 MICROgram(s)/formoterol 4.5 MICROgram(s) Inhaler 2 Puff(s) Inhalation two times a day  carvedilol 12.5 milliGRAM(s) Oral every 12 hours  celecoxib 200 milliGRAM(s) Oral daily  docusate sodium 100 milliGRAM(s) Oral two times a day  DULoxetine 60 milliGRAM(s) Oral daily  folic acid 1 milliGRAM(s) Oral daily  gabapentin 400 milliGRAM(s) Oral every 12 hours  heparin  Injectable 5000 Unit(s) SubCutaneous every 12 hours  levothyroxine 125 MICROGram(s) Oral daily  loratadine 10 milliGRAM(s) Oral daily  LORazepam     Tablet 1 milliGRAM(s) Oral every 6 hours  losartan 100 milliGRAM(s) Oral daily  methylPREDNISolone sodium succinate Injectable 40 milliGRAM(s) IV Push two times a day  senna 2 Tablet(s) Oral at bedtime  tiotropium 18 MICROgram(s) Capsule 1 Capsule(s) Inhalation daily  trihexyphenidyl 2 milliGRAM(s) Oral two times a day    MEDICATIONS  (PRN):  ALBUTerol/ipratropium for Nebulization 3 milliLiter(s) Nebulizer every 3 hours PRN Shortness of Breath and/or Wheezing  oxyCODONE    IR 5 milliGRAM(s) Oral every 6 hours PRN Moderate Pain (4 - 6)  polyethylene glycol 3350 17 Gram(s) Oral daily PRN Constipation      Allergies    No Known Allergies    Intolerances        REVIEW OF SYSTEMS:    Unable to examine due to [ ] Altered Mental Status [ ] Advanced Dementia [ ] Expressive Aphasia [ ] Non-verbal patient    CONSTITUTIONAL: No fever, NO generalized weakness/Fatigue, No weight loss  EYES: No eye pain, visual disturbances, or discharge  ENMT:  No difficulty hearing, tinnitus, vertigo; No sinus or throat pain  NECK: No pain or stiffness  RESPIRATORY: + shortness of breath, + cough, + wheezing, no sputum or hemoptysis   CARDIOVASCULAR: No chest pain, palpitations, or leg swelling  GASTROINTESTINAL: No abdominal pain. No nausea, vomiting, diarrhea or constipation. No melena or hematochezia.  GENITOURINARY: No dysuria, frequency, hematuria, or incontinence  NEUROLOGICAL: No headaches, Dizziness, memory loss, loss of strength, numbness, or tremors  SKIN: No itching, burning, rashes, or lesions   MUSCULOSKELETAL: No joint pain or swelling; No muscle, back, or extremity pain  PSYCHIATRIC: No depression, anxiety, mood swings, or difficulty sleeping  HEME/LYMPH: No easy bruising, or bleeding gums      Vital Signs Last 24 Hrs  T(C): 36.2 (21 Jul 2018 13:43), Max: 36.8 (21 Jul 2018 05:24)  T(F): 97.1 (21 Jul 2018 13:43), Max: 98.2 (21 Jul 2018 05:24)  HR: 93 (21 Jul 2018 13:43) (61 - 112)  BP: 132/44 (21 Jul 2018 13:43) (132/44 - 154/86)  BP(mean): --  RR: 17 (21 Jul 2018 13:43) (15 - 18)  SpO2: 93% (21 Jul 2018 13:43) (91% - 98%)    PHYSICAL EXAM:  GENERAL: NAD, Obese, well-groomed  HEAD:  Atraumatic, Normocephalic  EYES: conjunctiva and sclera clear  ENMT: Moist mucous membranes  NECK: Supple, No JVD, Normal thyroid  CHEST/LUNG: bilateral decrease breath sounds and faint diffuse wheezing  HEART: Regular rate and rhythm; No murmurs, rubs, or gallops  ABDOMEN: Soft, Nontender, Nondistended; Bowel sounds present  EXTREMITIES:  2+ Peripheral Pulses, No clubbing, cyanosis, or edema  SKIN: No rashes or lesions  NERVOUS SYSTEM:  Alert & Oriented X3, Good concentration; Motor Strength 5/5 B/L upper and lower extremities  Psych: Anxious     LABS:                        15.0   13.54 )-----------( 168      ( 21 Jul 2018 08:58 )             46.4     07-21    140  |  104  |  22  ----------------------------<  139<H>  4.6   |  27  |  0.78    Ca    9.9      21 Jul 2018 08:58  Phos  2.9     07-21  Mg     2.4     07-21    TPro  7.3  /  Alb  3.4  /  TBili  0.9  /  DBili  x   /  AST  14<L>  /  ALT  45  /  AlkPhos  96  07-21    PT/INR - ( 21 Jul 2018 08:58 )   PT: 11.3 sec;   INR: 1.04 ratio             CAPILLARY BLOOD GLUCOSE            Culture - Urine (collected 07-19-18)  Source: .Urine Clean Catch (Midstream)  Final Report (07-20-18):    No growth        RADIOLOGY & ADDITIONAL TESTS:          Imaging Personally Reviewed:  [ ] YES  [ ] NO    Consultant(s) Notes Reviewed:  [x ] YES  [ ] NO    Care Discussed with Consultants/Other Providers [x ] YES  [ ] NO

## 2018-07-21 NOTE — PROGRESS NOTE ADULT - PROBLEM SELECTOR PLAN 1
CXR showed no acute changes, improving with current regimen, Pulmonary note appreciated, cont with tapering IV steroids, and duonebs

## 2018-07-21 NOTE — PROGRESS NOTE ADULT - ASSESSMENT
· Pt is a 67 year old female with a PMH  of HTN, HLD, COPD, Asthma, Anxiety, Breast Cancer Left, Graves Disease, hyperthyroidism, Kidney cancer primary with mets, Sleep Apnea who presents to the ED with wheezing and SOB that  started this morning. Patient c/o  chest tightness, but no discrete chest pain. As per patient she was recently admitted to [facility] last week for the same symptoms.  She just completed a day course of Augmentin 875/125 mg yesterday. Feels like her usual COPD exacerbation. Patient admitted to medical surgical unit for COPD exacerbation. Patient will require approximately 2 overnight stays for IV Solumedrol and treatment. Pulmonary consult	  IMPROVE VTE Individual Risk Assessment          RISK                                                          Points    [ x ] Previous VTE                                                3    [  ] Thrombophilia                                             2    [ x ] Lower limb paralysis                                    2        (unable to hold up >15 seconds)      [  ] Current Cancer                                             2         (within 6 months)    [ x ] Immobilization > 24 hrs                              1    [  ] ICU/CCU stay > 24 hours                            1    [ x ] Age > 60                                                    1    IMPROVE VTE Score _____7____    Low risk 0-1: [  ] Early ambulation                          [  ] Intermittent Compression Device    High Risk 2-12: [  ] Heparin 5,000 units SC Q8 H                              [ X ] Heparin 5,000 units SC Q 12 H                              [  ] LMWH 40 mg SC daily (CrCl > 30 ml)

## 2018-07-21 NOTE — PROGRESS NOTE ADULT - SUBJECTIVE AND OBJECTIVE BOX
Doing better Less sob Cont Rx Doing better Less sob Cont Rx        [facility]  [id number]   ALLERGY  nka   CONTACT  Sp Panfilo HARRISON    VISIT HISTORY 5/14-5/19/2017 [facility]   REASON FOR VISIT   Initial evaluation/Pulmonary consultation requested 7/19/2018  by Dr Silveira from Dr Gleason   Patient examined chart reviewed  HOSPITAL ADMISSION [facility] VS 7/19/2018    PATIENT CAME  FROM (if information available)      PATIENT DESCRIPTION 7/19/2018 ADMISSION [facility] VS   67 F quit smoking 1992 1 ppd before that patient with a history or localized breast CA with past tylectomy presumed to be in remission since 1997, abnormal pharmacologic cardiac stress in 11/2015, hypernephroma S/P partial nephrectomy presumed to be disease free, COPD with patient quit tobacco in 1997 (1 ppd x 25 years cigarettes), complicated course following 2 laminectomies with progressive functional decline with transient decrease in ADL, chronic pain syndrome, chronic anxiety disorder benzodiazepine dependent last admitted [facility] 5/14-5/19/2017 and was DCed to inpatient Psych care Currently bedridden since last laminectomy 2015  HOME MEDS 7/19/2018 Oxycodone 5.4 Duloxetine 60 atorvastat 40 asa 81 levoxyl 125 ativan 1.4 gabapentin 400.2 spironolactone 25 Latuda 80   HPI 7/19/2018 ADMISSION Patient admitted with SOB wheezing started 7/19/2018 am Chest tightness but no CP HO recent hosp with similar problems [facility] and sp recent course augmentin     REVIEW OF SYMPTOMS    Able to give ROS  Yes     RELIABLE No   CONSTITUTIONAL Weakness Yes  Chills No Vision changes No  ENDOCRINE No unexplained hair loss No heat or cold intolerance    ALLERGY No hives  Sore throat No   RESP Coughing blood no  Shortness of breath YES   NEURO No Headache  Confusion Pain neck No   CARDIAC No Chest pain No Palpitations   GI No Pain abdomen NO   Vomiting NO     PHYSICAL EXAM    HEENT Unremarkable PERRLA atraumatic   RESP Fair air entry EXP prolonged    Harsh breath sound Resp distres mild   CARDIAC S1 S2 No S3     NO JVD    ABDOMEN SOFT BS PRESENT NOT DISTENDED No hepatosplenomegaly PEDAL EDEMA present No calf tenderness  NO rash   GENERAL Not TOXIC looking    VITALS/LABS                           7/21/2018 968f 89 136/55   7/21/2018 W 13.5 Hb 15 Plt 168 INR 1 Na 140 K 4.6 CO2 27 Cr .7     PATIENT DATA ASSESSMENT RECOMMENDATIONS 7/19/2018 ADMISSION [facility] VS     RESP DISTRESS  7/19/2018 .28 740/42/92   ASSESSMENT/RECOMMENDATIONS    Monitor PO Target PO 90-95%     INFECTION  LEUKOCYTOSIS 7/19-7/21/2018 W 15.6 - 13.3   7/21/2018 pct .02   ASSESSMENT/RECOMMENDATIONS    7/21/2018 Doubt bact infection      DYSPNEA    7/20/2018 V duplex legs n  ASSESSMENT/RECOMMENDATIONS    7/19/2018 Likely sec COPD ex Will check ECHO     COPD EX   HO COPD with patient quit tobacco in 1997 (1 ppd x 25 years cigarettes)  7/19/2018 Has home neb Does not have home O2   Duoneb.4 (7/19)   Spiriva (7/19)   Solumed 40.3 (7/19)   ASSESSMENT/RECOMMENDATIONS    7/21/2018 No string susp for infection     CAD   HO abn stress test in past  7/19/2018 Vague chest pressure in HPI   7/19/2018 Ddimer 217   7/19/2018 Tr 1 n.2   ASA 81 (7/19)  Atorvastat 40 (7/19)   ASSESSMENT/RECOMMENDATIONS    7/19/2018 Monitor enz    SLEEP APNEA   7/19/2018 Pt used to have CAPAP but they took it away as she was not using it enough  ASSESSMENT/RECOMMENDATIONS    7/20/2018 Pt will see me as outpt and will arrange PSG     HO DVT   HO DVT R leg 3 y ago postop laminectomy   7/19/2018 Ddimer 217   ASSESSMENT/RECOMMENDATIONS    7/20/2018 V duplex legs n  7/19/2018 Check V duplex legs given ho canceer in past     GLOBAL ISSUE/BEST PRACTICE:      PROBLEM: HOB elevation:   y            PROBLEM: Stress ulcer proph:    na                      PROBLEM: VTE prophylaxis:      hsc (7/19)  PROBLEM: Glycemic control:    na  PROBLEM: Nutrition:    santana (7/19)   PROBLEM: Advanced directive: na     PROBLEM: Allergies:  na      TIME SPENT Over 25 minutes aggregate care time spent on encounter; activities included   direct patient care, counseling and/or coordinating care reviewing notes, lab data/ imaging , discussion with multidisciplinary team/ patient  /family

## 2018-07-22 PROCEDURE — 99233 SBSQ HOSP IP/OBS HIGH 50: CPT

## 2018-07-22 RX ADMIN — GABAPENTIN 400 MILLIGRAM(S): 400 CAPSULE ORAL at 06:10

## 2018-07-22 RX ADMIN — Medication 1 MILLIGRAM(S): at 11:19

## 2018-07-22 RX ADMIN — Medication 3 MILLILITER(S): at 23:02

## 2018-07-22 RX ADMIN — DULOXETINE HYDROCHLORIDE 60 MILLIGRAM(S): 30 CAPSULE, DELAYED RELEASE ORAL at 11:21

## 2018-07-22 RX ADMIN — AMLODIPINE BESYLATE 2.5 MILLIGRAM(S): 2.5 TABLET ORAL at 06:11

## 2018-07-22 RX ADMIN — Medication 3 MILLILITER(S): at 05:18

## 2018-07-22 RX ADMIN — Medication 2 MILLIGRAM(S): at 17:01

## 2018-07-22 RX ADMIN — LORATADINE 10 MILLIGRAM(S): 10 TABLET ORAL at 17:01

## 2018-07-22 RX ADMIN — HEPARIN SODIUM 5000 UNIT(S): 5000 INJECTION INTRAVENOUS; SUBCUTANEOUS at 06:11

## 2018-07-22 RX ADMIN — BUDESONIDE AND FORMOTEROL FUMARATE DIHYDRATE 2 PUFF(S): 160; 4.5 AEROSOL RESPIRATORY (INHALATION) at 17:00

## 2018-07-22 RX ADMIN — OXYCODONE HYDROCHLORIDE 5 MILLIGRAM(S): 5 TABLET ORAL at 13:55

## 2018-07-22 RX ADMIN — OXYCODONE HYDROCHLORIDE 5 MILLIGRAM(S): 5 TABLET ORAL at 13:29

## 2018-07-22 RX ADMIN — CELECOXIB 200 MILLIGRAM(S): 200 CAPSULE ORAL at 11:21

## 2018-07-22 RX ADMIN — CARVEDILOL PHOSPHATE 12.5 MILLIGRAM(S): 80 CAPSULE, EXTENDED RELEASE ORAL at 17:00

## 2018-07-22 RX ADMIN — Medication 100 MILLIGRAM(S): at 17:01

## 2018-07-22 RX ADMIN — Medication 40 MILLIGRAM(S): at 06:11

## 2018-07-22 RX ADMIN — LOSARTAN POTASSIUM 100 MILLIGRAM(S): 100 TABLET, FILM COATED ORAL at 06:11

## 2018-07-22 RX ADMIN — Medication 1 MILLIGRAM(S): at 23:34

## 2018-07-22 RX ADMIN — Medication 81 MILLIGRAM(S): at 11:19

## 2018-07-22 RX ADMIN — GABAPENTIN 400 MILLIGRAM(S): 400 CAPSULE ORAL at 17:01

## 2018-07-22 RX ADMIN — Medication 1 MILLIGRAM(S): at 17:00

## 2018-07-22 RX ADMIN — Medication 125 MICROGRAM(S): at 06:10

## 2018-07-22 RX ADMIN — Medication 100 MILLIGRAM(S): at 06:10

## 2018-07-22 RX ADMIN — OXYCODONE HYDROCHLORIDE 5 MILLIGRAM(S): 5 TABLET ORAL at 20:48

## 2018-07-22 RX ADMIN — CARVEDILOL PHOSPHATE 12.5 MILLIGRAM(S): 80 CAPSULE, EXTENDED RELEASE ORAL at 06:10

## 2018-07-22 RX ADMIN — OXYCODONE HYDROCHLORIDE 5 MILLIGRAM(S): 5 TABLET ORAL at 07:11

## 2018-07-22 RX ADMIN — ATORVASTATIN CALCIUM 40 MILLIGRAM(S): 80 TABLET, FILM COATED ORAL at 22:05

## 2018-07-22 RX ADMIN — Medication 3 MILLILITER(S): at 12:47

## 2018-07-22 RX ADMIN — CELECOXIB 200 MILLIGRAM(S): 200 CAPSULE ORAL at 12:00

## 2018-07-22 RX ADMIN — Medication 3 MILLILITER(S): at 17:28

## 2018-07-22 RX ADMIN — HEPARIN SODIUM 5000 UNIT(S): 5000 INJECTION INTRAVENOUS; SUBCUTANEOUS at 17:00

## 2018-07-22 RX ADMIN — Medication 1 MILLIGRAM(S): at 06:11

## 2018-07-22 RX ADMIN — OXYCODONE HYDROCHLORIDE 5 MILLIGRAM(S): 5 TABLET ORAL at 19:48

## 2018-07-22 RX ADMIN — Medication 40 MILLIGRAM(S): at 17:00

## 2018-07-22 RX ADMIN — OXYCODONE HYDROCHLORIDE 5 MILLIGRAM(S): 5 TABLET ORAL at 06:11

## 2018-07-22 RX ADMIN — Medication 2 MILLIGRAM(S): at 06:10

## 2018-07-22 RX ADMIN — BUDESONIDE AND FORMOTEROL FUMARATE DIHYDRATE 2 PUFF(S): 160; 4.5 AEROSOL RESPIRATORY (INHALATION) at 06:13

## 2018-07-22 NOTE — PROGRESS NOTE ADULT - SUBJECTIVE AND OBJECTIVE BOX
[facility]IGAN [facility]  [id number]   ALLERGY  nka   CONTACT  Sp Panfilo N    VISIT HISTORY 5/14-5/19/2017 [facility]     PATIENT DESCRIPTION 7/19/2018 ADMISSION [facility] VS   67 F quit smoking 1992 1 ppd before that patient with a history or localized breast CA with past tylectomy presumed to be in remission since 1997, abnormal pharmacologic cardiac stress in 11/2015, hypernephroma S/P partial nephrectomy presumed to be disease free, COPD with patient quit tobacco in 1997 (1 ppd x 25 years cigarettes), complicated course following 2 laminectomies with progressive functional decline with transient decrease in ADL, chronic pain syndrome, chronic anxiety disorder benzodiazepine dependent last admitted [facility] 5/14-5/19/2017 and was DCed to inpatient Psych care Currently bedridden since last laminectomy 2015  HOME MEDS 7/19/2018 Oxycodone 5.4 Duloxetine 60 atorvastat 40 asa 81 levoxyl 125 ativan 1.4 gabapentin 400.2 spironolactone 25 Latuda 80   HPI 7/19/2018 ADMISSION Patient admitted with SOB wheezing started 7/19/2018 am Chest tightness but no CP HO recent hosp with similar problems [facility] and sp recent course augmentin     REVIEW OF SYMPTOMS    Able to give ROS  Yes     RELIABLE No   CONSTITUTIONAL Weakness Yes  Chills No Vision changes No  ENDOCRINE No unexplained hair loss No heat or cold intolerance    ALLERGY No hives  Sore throat No   RESP Coughing blood no  Shortness of breath YES   NEURO No Headache  Confusion Pain neck No   CARDIAC No Chest pain No Palpitations   GI No Pain abdomen NO   Vomiting NO     PHYSICAL EXAM    HEENT Unremarkable PERRLA atraumatic   RESP Fair air entry EXP prolonged    Harsh breath sound Resp distres mild   CARDIAC S1 S2 No S3     NO JVD    ABDOMEN SOFT BS PRESENT NOT DISTENDED No hepatosplenomegaly PEDAL EDEMA present No calf tenderness  NO rash   GENERAL Not TOXIC looking    VITALS/LABS                           7/22/2018 afeb 82 120/60 20 95%   7/21/2018 W 13.5 Hb 15 Plt 168 INR 1 Na 140 K 4.6 CO2 27 Cr .7     PATIENT DATA ASSESSMENT RECOMMENDATIONS 7/19/2018 ADMISSION [facility] VS     RESP DISTRESS  7/19/2018 .28 740/42/92   ASSESSMENT/RECOMMENDATIONS    Monitor PO Target PO 90-95%     INFECTION  LEUKOCYTOSIS 7/19-7/21/2018 W 15.6 - 13.3   7/21/2018 pct .02   ASSESSMENT/RECOMMENDATIONS    7/21/2018 Doubt bact infection    DYSPNEA    7/20/2018 V duplex legs n  ASSESSMENT/RECOMMENDATIONS    7/19/2018 Likely sec COPD ex Will check ECHO     COPD EX   HO COPD with patient quit tobacco in 1997 (1 ppd x 25 years cigarettes)  7/19/2018 Has home neb Does not have home O2   Duoneb.4 (7/19)   Spiriva (7/19)   Solumed 40.3 (7/19)   ASSESSMENT/RECOMMENDATIONS    7/21/2018 No string susp for infection     CAD   HO abn stress test in past  7/19/2018 Vague chest pressure in HPI   7/19/2018 Ddimer 217   7/19/2018 Tr 1 n.2   ASA 81 (7/19)  Atorvastat 40 (7/19)   ASSESSMENT/RECOMMENDATIONS    7/19/2018 Monitor enz    SLEEP APNEA   7/19/2018 Pt used to have CAPAP but they took it away as she was not using it enough  ASSESSMENT/RECOMMENDATIONS    7/20/2018 Pt will see me as outpt and will arrange PSG     HO DVT   HO DVT R leg 3 y ago postop laminectomy   7/19/2018 Ddimer 217   ASSESSMENT/RECOMMENDATIONS    7/20/2018 V duplex legs n      HYPOTHYROID  Levoxyl 125 (7/19)     PAIN   Gabapentin 400.2 (7/19)   Oxycodone 5.4p (7/19)     BZD DEPENDENT   Lorazepam1.4 (7/19)     HO PSYCH PROBLEMS   Lurasidone 80 (7/19)     GLOBAL ISSUE/BEST PRACTICE:      PROBLEM: HOB elevation:   y            PROBLEM: Stress ulcer proph:    na                      PROBLEM: VTE prophylaxis:      hsc (7/19)  PROBLEM: Glycemic control:    na  PROBLEM: Nutrition:    santana (7/19)   PROBLEM: Advanced directive: na     PROBLEM: Allergies:  na      TIME SPENT Over 25 minutes aggregate care time spent on encounter; activities included   direct patient care, counseling and/or coordinating care reviewing notes, lab data/ imaging , discussion with multidisciplinary team/ patient  /family

## 2018-07-22 NOTE — PROGRESS NOTE ADULT - SUBJECTIVE AND OBJECTIVE BOX
Patient is a 67y old  Female who presents with a chief complaint of Chest tightness and Wheezing (19 Jul 2018 17:48)      INTERVAL HPI/ OVERNIGHT EVENTS: Pt was seen and examined at bedside today, No significant overnight events, pt admits that breathing is improving, no wheezing this morning or cough.      MEDICATIONS  (STANDING):  ALBUTerol/ipratropium for Nebulization 3 milliLiter(s) Nebulizer every 6 hours  amLODIPine   Tablet 2.5 milliGRAM(s) Oral daily  aspirin  chewable 81 milliGRAM(s) Oral daily  atorvastatin 40 milliGRAM(s) Oral at bedtime  buDESOnide  80 MICROgram(s)/formoterol 4.5 MICROgram(s) Inhaler 2 Puff(s) Inhalation two times a day  carvedilol 12.5 milliGRAM(s) Oral every 12 hours  celecoxib 200 milliGRAM(s) Oral daily  docusate sodium 100 milliGRAM(s) Oral two times a day  DULoxetine 60 milliGRAM(s) Oral daily  folic acid 1 milliGRAM(s) Oral daily  gabapentin 400 milliGRAM(s) Oral every 12 hours  heparin  Injectable 5000 Unit(s) SubCutaneous every 12 hours  levothyroxine 125 MICROGram(s) Oral daily  loratadine 10 milliGRAM(s) Oral daily  LORazepam     Tablet 1 milliGRAM(s) Oral every 6 hours  losartan 100 milliGRAM(s) Oral daily  methylPREDNISolone sodium succinate Injectable 40 milliGRAM(s) IV Push once  senna 2 Tablet(s) Oral at bedtime  tiotropium 18 MICROgram(s) Capsule 1 Capsule(s) Inhalation daily  trihexyphenidyl 2 milliGRAM(s) Oral two times a day    MEDICATIONS  (PRN):  ALBUTerol/ipratropium for Nebulization 3 milliLiter(s) Nebulizer every 3 hours PRN Shortness of Breath and/or Wheezing  oxyCODONE    IR 5 milliGRAM(s) Oral every 6 hours PRN Moderate Pain (4 - 6)  polyethylene glycol 3350 17 Gram(s) Oral daily PRN Constipation      Allergies    No Known Allergies    Intolerances        REVIEW OF SYSTEMS:    Unable to examine due to [ ] Altered Mental Status [ ] Advanced Dementia [ ] Expressive Aphasia [ ] Non-verbal patient    CONSTITUTIONAL: No fever, NO generalized weakness/Fatigue, No weight loss  EYES: No eye pain, visual disturbances, or discharge  ENMT:  No difficulty hearing, tinnitus, vertigo; No sinus or throat pain  NECK: No pain or stiffness  RESPIRATORY: + shortness of breath, NO cough, wheezing, no sputum or hemoptysis   CARDIOVASCULAR: No chest pain, palpitations, or leg swelling  GASTROINTESTINAL: No abdominal pain. No nausea, vomiting, diarrhea or constipation. No melena or hematochezia.  GENITOURINARY: No dysuria, frequency, hematuria, or incontinence  NEUROLOGICAL: No headaches, Dizziness, memory loss, loss of strength, numbness, or tremors  SKIN: No itching, burning, rashes, or lesions   MUSCULOSKELETAL: No joint pain or swelling; No muscle, back, or extremity pain  PSYCHIATRIC: No depression, anxiety, mood swings, or difficulty sleeping  HEME/LYMPH: No easy bruising, or bleeding gums      Vital Signs Last 24 Hrs  T(C): 36.1 (22 Jul 2018 11:37), Max: 36.4 (22 Jul 2018 00:06)  T(F): 97 (22 Jul 2018 11:37), Max: 97.6 (22 Jul 2018 05:10)  HR: 78 (22 Jul 2018 12:55) (50 - 89)  BP: 131/70 (22 Jul 2018 11:37) (119/61 - 138/60)  BP(mean): --  RR: 17 (22 Jul 2018 11:37) (17 - 18)  SpO2: 94% (22 Jul 2018 12:55) (93% - 97%)    PHYSICAL EXAM:  GENERAL: NAD, Obese, well-groomed  HEAD:  Atraumatic, Normocephalic  EYES: conjunctiva and sclera clear  ENMT: Moist mucous membranes  NECK: Supple, No JVD, Normal thyroid  CHEST/LUNG: bilateral decrease breath sounds otherwise CTA   HEART: Regular rate and rhythm; No murmurs, rubs, or gallops  ABDOMEN: Soft, Nontender, Nondistended; Bowel sounds present  EXTREMITIES:  2+ Peripheral Pulses, No clubbing, cyanosis, or edema  SKIN: No rashes or lesions  NERVOUS SYSTEM:  Alert & Oriented X3, Good concentration; Motor Strength 5/5 B/L upper and lower extremities      LABS:                        15.0   13.54 )-----------( 168      ( 21 Jul 2018 08:58 )             46.4     07-21    140  |  104  |  22  ----------------------------<  139<H>  4.6   |  27  |  0.78    Ca    9.9      21 Jul 2018 08:58  Phos  2.9     07-21  Mg     2.4     07-21    TPro  7.3  /  Alb  3.4  /  TBili  0.9  /  DBili  x   /  AST  14<L>  /  ALT  45  /  AlkPhos  96  07-21    PT/INR - ( 21 Jul 2018 08:58 )   PT: 11.3 sec;   INR: 1.04 ratio             CAPILLARY BLOOD GLUCOSE            Culture - Urine (collected 07-19-18)  Source: .Urine Clean Catch (Midstream)  Final Report (07-20-18):    No growth        RADIOLOGY & ADDITIONAL TESTS:          Imaging Personally Reviewed:  [ ] YES  [ ] NO    Consultant(s) Notes Reviewed:  [ ] YES  [ ] NO    Care Discussed with Consultants/Other Providers [x ] YES  [ ] NO

## 2018-07-22 NOTE — PROGRESS NOTE ADULT - PROBLEM SELECTOR PLAN 1
CXR showed no acute changes, improving with current regimen, Pulmonary note appreciated, cont with tapering steroids, will change to oral prednisone tomm morning

## 2018-07-22 NOTE — PROGRESS NOTE ADULT - ASSESSMENT
· Pt is a 67 year old female with a PMH  of HTN, HLD, COPD, Asthma, Anxiety, Breast Cancer Left, Graves Disease, hyperthyroidism, Kidney cancer primary with mets, Sleep Apnea who presents to the ED with wheezing and SOB that  started this morning. Patient c/o  chest tightness, but no discrete chest pain. As per patient she was recently admitted to [facility] last week for the same symptoms.  She just completed a day course of Augmentin 875/125 mg yesterday. Feels like her usual COPD exacerbation. Patient admitted to medical surgical unit for COPD exacerbation. Patient will require approximately 2 overnight stays for IV Solumedrol and treatment. Pulmonary consult	  IMPROVE VTE Individual Risk Assessment          RISK                                                          Points    [ x ] Previous VTE                                                3    [  ] Thrombophilia                                             2    [ x ] Lower limb paralysis                                    2        (unable to hold up >15 seconds)      [  ] Current Cancer                                             2         (within 6 months)    [ x ] Immobilization > 24 hrs                              1    [  ] ICU/CCU stay > 24 hours                            1    [ x ] Age > 60                                                    1    IMPROVE VTE Score _____7____    Low risk 0-1: [  ] Early ambulation                          [  ] Intermittent Compression Device    High Risk 2-12: [  ] Heparin 5,000 units SC Q8 H                              [ X ] Heparin 5,000 units SC Q 12 H                              [  ] LMWH 40 mg SC daily (CrCl > 30 ml)

## 2018-07-22 NOTE — PHYSICAL THERAPY INITIAL EVALUATION ADULT - ADDITIONAL COMMENTS
Patient reports 3 years ago, she was bedbound/nonambulatory but approximately a year and a half ago she had surgery and regained her movement. She is able to ambulate with supervision household distances with rolling walker and requires minimal assistance (to move legs out of bed) for bed mobility. Patient reports that her  assists with ADLs and denies having a private aide. Patient reports she lives in a co--op with elevator, no stairs to negotiate.

## 2018-07-23 ENCOUNTER — TRANSCRIPTION ENCOUNTER (OUTPATIENT)
Age: 68
End: 2018-07-23

## 2018-07-23 VITALS
TEMPERATURE: 99 F | SYSTOLIC BLOOD PRESSURE: 120 MMHG | DIASTOLIC BLOOD PRESSURE: 42 MMHG | OXYGEN SATURATION: 91 % | HEART RATE: 72 BPM | RESPIRATION RATE: 18 BRPM

## 2018-07-23 PROCEDURE — 99239 HOSP IP/OBS DSCHRG MGMT >30: CPT

## 2018-07-23 RX ORDER — IPRATROPIUM/ALBUTEROL SULFATE 18-103MCG
3 AEROSOL WITH ADAPTER (GRAM) INHALATION
Qty: 20 | Refills: 0 | OUTPATIENT
Start: 2018-07-23

## 2018-07-23 RX ORDER — BUDESONIDE AND FORMOTEROL FUMARATE DIHYDRATE 160; 4.5 UG/1; UG/1
2 AEROSOL RESPIRATORY (INHALATION) ONCE
Qty: 0 | Refills: 0 | Status: COMPLETED | OUTPATIENT
Start: 2018-07-23 | End: 2018-07-23

## 2018-07-23 RX ORDER — IPRATROPIUM/ALBUTEROL SULFATE 18-103MCG
3 AEROSOL WITH ADAPTER (GRAM) INHALATION ONCE
Qty: 0 | Refills: 0 | Status: COMPLETED | OUTPATIENT
Start: 2018-07-23 | End: 2018-07-23

## 2018-07-23 RX ADMIN — CELECOXIB 200 MILLIGRAM(S): 200 CAPSULE ORAL at 11:38

## 2018-07-23 RX ADMIN — LOSARTAN POTASSIUM 100 MILLIGRAM(S): 100 TABLET, FILM COATED ORAL at 05:35

## 2018-07-23 RX ADMIN — OXYCODONE HYDROCHLORIDE 5 MILLIGRAM(S): 5 TABLET ORAL at 06:48

## 2018-07-23 RX ADMIN — HEPARIN SODIUM 5000 UNIT(S): 5000 INJECTION INTRAVENOUS; SUBCUTANEOUS at 05:35

## 2018-07-23 RX ADMIN — CARVEDILOL PHOSPHATE 12.5 MILLIGRAM(S): 80 CAPSULE, EXTENDED RELEASE ORAL at 05:36

## 2018-07-23 RX ADMIN — OXYCODONE HYDROCHLORIDE 5 MILLIGRAM(S): 5 TABLET ORAL at 05:48

## 2018-07-23 RX ADMIN — OXYCODONE HYDROCHLORIDE 5 MILLIGRAM(S): 5 TABLET ORAL at 11:51

## 2018-07-23 RX ADMIN — Medication 1 MILLIGRAM(S): at 15:14

## 2018-07-23 RX ADMIN — OXYCODONE HYDROCHLORIDE 5 MILLIGRAM(S): 5 TABLET ORAL at 11:36

## 2018-07-23 RX ADMIN — Medication 125 MICROGRAM(S): at 05:35

## 2018-07-23 RX ADMIN — Medication 3 MILLILITER(S): at 05:02

## 2018-07-23 RX ADMIN — GABAPENTIN 400 MILLIGRAM(S): 400 CAPSULE ORAL at 05:36

## 2018-07-23 RX ADMIN — Medication 81 MILLIGRAM(S): at 11:38

## 2018-07-23 RX ADMIN — BUDESONIDE AND FORMOTEROL FUMARATE DIHYDRATE 2 PUFF(S): 160; 4.5 AEROSOL RESPIRATORY (INHALATION) at 11:36

## 2018-07-23 RX ADMIN — AMLODIPINE BESYLATE 2.5 MILLIGRAM(S): 2.5 TABLET ORAL at 05:35

## 2018-07-23 RX ADMIN — Medication 1 MILLIGRAM(S): at 05:36

## 2018-07-23 RX ADMIN — LORATADINE 10 MILLIGRAM(S): 10 TABLET ORAL at 11:36

## 2018-07-23 RX ADMIN — Medication 2 MILLIGRAM(S): at 05:35

## 2018-07-23 RX ADMIN — DULOXETINE HYDROCHLORIDE 60 MILLIGRAM(S): 30 CAPSULE, DELAYED RELEASE ORAL at 11:38

## 2018-07-23 RX ADMIN — Medication 1 MILLIGRAM(S): at 11:36

## 2018-07-23 RX ADMIN — BUDESONIDE AND FORMOTEROL FUMARATE DIHYDRATE 2 PUFF(S): 160; 4.5 AEROSOL RESPIRATORY (INHALATION) at 05:35

## 2018-07-23 RX ADMIN — Medication 3 MILLILITER(S): at 10:02

## 2018-07-23 RX ADMIN — Medication 1 MILLIGRAM(S): at 11:38

## 2018-07-23 NOTE — DISCHARGE NOTE ADULT - PATIENT PORTAL LINK FT
You can access the [facility] Patient Portal, offered by [facility], by registering with the following website: [email or web address]

## 2018-07-23 NOTE — DISCHARGE NOTE ADULT - SECONDARY DIAGNOSIS.
Hypertension, unspecified type Anxiety Hypothyroidism, unspecified type Hyperlipidemia, unspecified hyperlipidemia type

## 2018-07-23 NOTE — DISCHARGE NOTE ADULT - PROVIDER TOKENS
DAE:'[id number]' TOKEN:'[id number]',FREE:[LAST:[saxanova],FIRST:[fatimah],PHONE:[phone]:[(   )    -]],FREE:[LAST:[Z],FIRST:[Marcela],PHONE:[phone]:[(   )    -],ADDRESS:[Pulmonary]]

## 2018-07-23 NOTE — DISCHARGE NOTE ADULT - MEDICATION SUMMARY - MEDICATIONS TO TAKE
I will START or STAY ON the medications listed below when I get home from the hospital:    predniSONE 5 mg/5 mL oral solution  -- 60 milliliter(s) by mouth once a day   -- Indication: For COPD exacerbation    spironolactone 25 mg oral tablet  -- 1 tab(s) by mouth once a day  -- Indication: For Hypertension, unspecified type    oxyCODONE 5 mg oral tablet  -- 1 tab(s) by mouth every 6 hours  -- Indication: For Pain should have at home    aspirin 81 mg oral tablet, chewable  -- 1 tab(s) by mouth once a day  -- Indication: For Preventive measure    meloxicam 7.5 mg oral tablet  -- 1 tab(s) by mouth once a day  -- Indication: For Pain    olmesartan 40 mg oral tablet  -- 1 tab(s) by mouth once a day  -- Indication: For blood pressure     gabapentin 400 mg oral tablet  -- 1 tab(s) by mouth 2 times a day  -- Indication: For nerve pain     Ativan 1 mg oral tablet  -- 1 tab(s) by mouth 4 times a day  -- Indication: For Anxiety    DULoxetine 60 mg oral delayed release capsule  -- 1 cap(s) by mouth once a day  -- Indication: For depression    atorvastatin 40 mg oral tablet  -- 1 tab(s) by mouth once a day (at bedtime)  -- Indication: For Cholestrol     trihexyphenidyl 2 mg oral tablet  -- 1 tab(s) by mouth 2 times a day  -- Indication: For tremour     carvedilol 12.5 mg oral tablet  -- 1 tab(s) by mouth 2 times a day  -- Indication: For blood pressure     albuterol 2 mg oral tablet  -- 2.5 milligram(s) by mouth once a day  -- Indication: For COPD exacerbation    Breo Ellipta 100 mcg-25 mcg/inh inhalation powder  -- 1 puff(s) inhaled once a day  -- Indication: For COPD exacerbation    Incruse Ellipta 62.5 mcg/inh inhalation powder  -- 1 puff(s) inhaled once a day  -- Indication: For COPD exacerbation    Ventolin HFA 90 mcg/inh inhalation aerosol  -- 2 puff(s) inhaled 4 times a day, As Needed  -- Indication: For COPD exacerbation    ipratropium-albuterol 0.5 mg-2.5 mg/3 mLinhalation solution  -- 3 milliliter(s) inhaled every 3 hours, As needed, Shortness of Breath and/or Wheezing  -- Indication: For COPD exacerbation    amLODIPine 2.5 mg oral tablet  -- 1 tab(s) by mouth once a day  -- Indication: For blood pressure     torsemide 10 mg oral tablet  -- 1 tab(s) by mouth once a day  -- Indication: For blood pressure     levothyroxine 125 mcg (0.125 mg) oral capsule  -- 1 cap(s) by mouth once a day  -- Indication: For thyroid    folic acid 1 mg oral tablet  -- 1 tab(s) by mouth once a day  -- Indication: For supplement

## 2018-07-23 NOTE — PROGRESS NOTE ADULT - SUBJECTIVE AND OBJECTIVE BOX
PEDRO CORRIGAN [facility]  [phone]   ALLERGY  nka   CONTACT  Sp Panfilo N    VISIT HISTORY 5/14-5/19/2017 [facility]     PATIENT DESCRIPTION 7/19/2018 ADMISSION [facility]   67 F quit smoking 1992 1 ppd before that patient with a history or localized breast CA with past tylectomy presumed to be in remission since 1997, abnormal pharmacologic cardiac stress in 11/2015, hypernephroma S/P partial nephrectomy presumed to be disease free, COPD with patient quit tobacco in 1997 (1 ppd x 25 years cigarettes), complicated course following 2 laminectomies with progressive functional decline with transient decrease in ADL, chronic pain syndrome, chronic anxiety disorder benzodiazepine dependent last admitted [facility] 5/14-5/19/2017 and was DCed to inpatient Psych care Currently bedridden since last laminectomy 2015  HOME MEDS 7/19/2018 Oxycodone 5.4 Duloxetine 60 atorvastat 40 asa 81 levoxyl 125 ativan 1.4 gabapentin 400.2 spironolactone 25 Latuda 80   HPI 7/19/2018 ADMISSION Patient admitted with SOB wheezing started 7/19/2018 am Chest tightness but no CP HO recent hosp with similar problems [facility] and sp recent course augmentin     REVIEW OF SYMPTOMS    Able to give ROS  Yes     RELIABLE No   CONSTITUTIONAL Weakness Yes  Chills No Vision changes No  ENDOCRINE No unexplained hair loss No heat or cold intolerance    ALLERGY No hives  Sore throat No   RESP Coughing blood no  Shortness of breath YES   NEURO No Headache  Confusion Pain neck No   CARDIAC No Chest pain No Palpitations   GI No Pain abdomen NO   Vomiting NO     PHYSICAL EXAM    HEENT Unremarkable PERRLA atraumatic   RESP Fair air entry EXP prolonged    Harsh breath sound Resp distres mild   CARDIAC S1 S2 No S3     NO JVD    ABDOMEN SOFT BS PRESENT NOT DISTENDED No hepatosplenomegaly PEDAL EDEMA present No calf tenderness  NO rash   GENERAL Not TOXIC looking    VITALS/LABS                           7/23/2018 afeb 58 152/79 19 100%   7/21/2018 W 13.5 Hb 15 Plt 168 INR 1 Na 140 K 4.6 CO2 27 Cr .7     CURRENT PROBLEMS 7/19/2018 ADMISSION [facility]   RESP DISTRESS  INFECTION  LEUKOCYTOSIS 7/19/2018 W 15.6   COPD EX   SLEEP APNEA   HO DVT   HYPERKALEMIA 7/19/2018 K 5.4   HO PSYCH PROBLEMS     PATIENT DATA ASSESSMENT RECOMMENDATIONS 7/19/2018 ADMISSION [facility]     RESP DISTRESS  7/19/2018 .28 740/42/92   ASSESSMENT/RECOMMENDATIONS    Monitor PO Target PO 90-95%     INFECTION  LEUKOCYTOSIS 7/19-7/21/2018 W 15.6 - 13.3   7/21/2018 pct .02   ASSESSMENT/RECOMMENDATIONS    7/21/2018 Doubt bact infection      DYSPNEA    7/20/2018 V duplex legs n  ASSESSMENT/RECOMMENDATIONS    7/19/2018 Likely sec COPD ex Will check ECHO     COPD EX   HO COPD with patient quit tobacco in 1997 (1 ppd x 25 years cigarettes)  7/19/2018 Has home neb Does not have home O2   Duoneb.4 (7/19)   Spiriva (7/19)   Solumed 40.3 (7/19) --> Changed --> Pred 60 (7/22)   ASSESSMENT/RECOMMENDATIONS    7/21/2018 No strong susp for bacterial  infection     CAD   HO abn stress test in past  7/19/2018 Vague chest pressure in HPI   7/19/2018 Ddimer 217   7/19/2018 Tr 1 n.2   ASA 81 (7/19)  Atorvastat 40 (7/19)   ASSESSMENT/RECOMMENDATIONS    7/19/2018 Monitor enz    SLEEP APNEA   7/19/2018 Pt used to have CAPAP but they took it away as she was not using it enough  ASSESSMENT/RECOMMENDATIONS    7/20/2018 Pt will see me as outpt and will arrange PSG       HO DVT   HO DVT R leg 3 y ago postop laminectomy   7/19/2018 Ddimer 217   ASSESSMENT/RECOMMENDATIONS    7/20/2018 V duplex legs n      HYPOTHYROID  Levoxyl 125 (7/19)     PAIN   Gabapentin 400.2 (7/19)   Oxycodone 5.4p (7/19)     BZD DEPENDENT   Lorazepam1.4 (7/19)     HO PSYCH PROBLEMS   Lurasidone 80 (7/19)     GLOBAL ISSUE/BEST PRACTICE:      PROBLEM: HOB elevation:   y            PROBLEM: Stress ulcer proph:    na                      PROBLEM: VTE prophylaxis:      hsc (7/19)  PROBLEM: Glycemic control:    na  PROBLEM: Nutrition:    santana (7/19)   PROBLEM: Advanced directive: na     PROBLEM: Allergies:  na      TIME SPENT Over 25 minutes aggregate care time spent on encounter; activities included   direct patient care, counseling and/or coordinating care reviewing notes, lab data/ imaging , discussion with multidisciplinary team/ patient  /family

## 2018-07-23 NOTE — PHARMACY COMMUNICATION NOTE - COMMENTS
spoke to md westfall aware of symbicort dosing interval. waants to give additional dose as per request of pt.

## 2018-07-23 NOTE — DISCHARGE NOTE ADULT - CARE PROVIDER_API CALL
Fernando Hull), Internal Medicine  [address] [place] [address]  Phone: [phone]  Fax: [phone] Fernando Hull), Internal Medicine  [address] [place] [address]  Phone: [phone]  Fax: [phone]    fatimah wright  Phone: [phone]  Fax: (   )    -    Marcela BEDOLLA  Pulmonary  Phone: [phone]  Fax: (   )    -

## 2018-07-23 NOTE — DISCHARGE NOTE ADULT - HOSPITAL COURSE
History and Physical:   Source of Information	Patient	  Outpatient Providers	Patient unable to recall ( Dr Rae?)	     Language:  · Patient/Family of Limited English Proficiency	No	       History of Present Illness:  Reason for Admission: Chest tightness and Wheezing	  History of Present Illness: 	  · Pt is a 67 year old female with a PMH  of HTN, HLD, COPD, Asthma, Anxiety, Breast Cancer Left, Graves Disease, hyperthyroidism, Kidney cancer primary with mets, Sleep Apnea who presents to the ED with wheezing and SOB that  started this morning. Patient c/o  chest tightness, but no discrete chest pain. As per patient she was recently admitted to [facility] last week for the same symptoms.  She just completed a day course of Augmentin 875/125 mg yesterday. Feels like her usual COPD exacerbation. Has had hx of DVT in the past. Patient is non ambulatory S/P laminectomy surgery 3 years ago. Denies recent travel. No fevers, no cough, no abdominal pain.	  PHYSICAL EXAM:  GENERAL: NAD, Obese, well-groomed  HEAD:  Atraumatic, Normocephalic  EYES: conjunctiva and sclera clear  ENMT: Moist mucous membranes  NECK: Supple, No JVD, Normal thyroid  CHEST/LUNG: bilateral decrease breath sounds otherwise CTA   HEART: Regular rate and rhythm; No murmurs, rubs, or gallops  ABDOMEN: Soft, Nontender, Nondistended; Bowel sounds present  EXTREMITIES:  2+ Peripheral Pulses, No clubbing, cyanosis, or edema  SKIN: No rashes or lesions  NERVOUS SYSTEM:  Alert & Oriented X3, Good concentration; Motor Strength 5/5 B/L upper and lower extremities        Assessment and Plan:   · Assessment		  · Pt is a 67 year old female with a PMH  of HTN, HLD, COPD, Asthma, Anxiety, Breast Cancer Left, Graves Disease, hyperthyroidism, Kidney cancer primary with mets, Sleep Apnea who presents to the ED with wheezing and SOB that  started this morning. Patient c/o  chest tightness, but no discrete chest pain. As per patient she was recently admitted to [facility] last week for the same symptoms.  She just completed a day course of Augmentin 875/125 mg yesterday. Feels like her usual COPD exacerbation. Patient admitted to medical surgical unit for COPD exacerbation. Patient will require approximately 2 overnight stays for IV Solumedrol and treatment. Pulmonary consult	       Problem/Plan - 1:  ·  Problem: COPD exacerbation.  Plan: CXR showed no acute changes, improving with current regimen, Pulmonary note appreciated, cont with tapering steroids, will change to oral prednisone tomm morning.      Problem/Plan - 2:  ·  Problem: Hyperkalemia.  Plan: Resolved.      Problem/Plan - 3:  ·  Problem: Hyperlipidemia, unspecified hyperlipidemia type.  Plan: Continue home meds,      Problem/Plan - 4:  ·  Problem: Hypertension, unspecified type.  Plan: cont home medications.      Problem/Plan - 5:  ·  Problem: Anxiety.  Plan: Continue Ativan, Latuda.      Problem/Plan - 6:  Problem: Hypothyroidism, unspecified type. Plan: Continue Levothyroxine.     Problem/Plan - 7:  ·  Problem: Kidney cancer, primary, with metastasis from kidney to other site, left.  Plan: History, follow up with outpatient care.      Problem/Plan - 8:  ·  Problem: Preventive measure.  Plan: DVTp: Heparin  Disposition: Home with home PT.       45 minutes spent on this discharge History and Physical:   Source of Information	Patient	  Outpatient Providers	Patient unable to recall ( Dr Rae?)	     Language:  · Patient/Family of Limited English Proficiency	No	       History of Present Illness:  Reason for Admission: Chest tightness and Wheezing	  History of Present Illness: 	  · Pt is a 67 year old female with a PMH  of HTN, HLD, COPD, Asthma, Anxiety, Breast Cancer Left, Graves Disease, hyperthyroidism, Kidney cancer primary with mets, Sleep Apnea who presents to the ED with wheezing and SOB that  started this morning. Patient c/o  chest tightness, but no discrete chest pain. As per patient she was recently admitted to [facility] last week for the same symptoms.  She just completed a day course of Augmentin 875/125 mg yesterday. Feels like her usual COPD exacerbation. Has had hx of DVT in the past. Patient is non ambulatory S/P laminectomy surgery 3 years ago. Denies recent travel. No fevers, no cough, no abdominal pain.	  PHYSICAL EXAM:  GENERAL: NAD, Obese, well-groomed  HEAD:  Atraumatic, Normocephalic  EYES: conjunctiva and sclera clear  ENMT: Moist mucous membranes  NECK: Supple, No JVD, Normal thyroid  CHEST/LUNG: bilateral decrease breath sounds otherwise CTA   HEART: Regular rate and rhythm; No murmurs, rubs, or gallops  ABDOMEN: Soft, Nontender, Nondistended; Bowel sounds present  EXTREMITIES:  2+ Peripheral Pulses, No clubbing, cyanosis, or edema  SKIN: No rashes or lesions  NERVOUS SYSTEM:  Alert & Oriented X3, Good concentration; Motor Strength 5/5 B/L upper and lower extremities        Assessment and Plan:   · Assessment		  · Pt is a 67 year old female with a PMH  of HTN, HLD, COPD, Asthma, Anxiety, Breast Cancer Left, Graves Disease, hyperthyroidism, Kidney cancer primary with mets, Sleep Apnea who presents to the ED with wheezing and SOB that  started this morning. Patient c/o  chest tightness, but no discrete chest pain. As per patient she was recently admitted to [facility] last week for the same symptoms.  She just completed a day course of Augmentin 875/125 mg yesterday. Feels like her usual COPD exacerbation. Patient admitted to medical surgical unit for COPD exacerbation. Patient will require approximately 2 overnight stays for IV Solumedrol and treatment. Pulmonary consult	       Problem/Plan - 1:  ·  Problem: COPD exacerbation.  Plan: CXR showed no acute changes, improving with current regimen, Pulmonary note appreciated, cont with tapering steroids, will change to oral prednisone tomm morning.      Problem/Plan - 2:  ·  Problem: Hyperkalemia.  Plan: Resolved.      Problem/Plan - 3:  ·  Problem: Hyperlipidemia, unspecified hyperlipidemia type.  Plan: Continue home meds,      Problem/Plan - 4:  ·  Problem: Hypertension, unspecified type.  Plan: cont home medications.      Problem/Plan - 5:  ·  Problem: Anxiety.  Plan: Continue Ativan, Latuda.      Problem/Plan - 6:  Problem: Hypothyroidism, unspecified type. Plan: Continue Levothyroxine.     Problem/Plan - 7:  ·  Problem: Kidney cancer, primary, with metastasis from kidney to other site, left.  Plan: History, follow up with outpatient care.      Problem/Plan - 8:  ·  Problem: Preventive measure.  Plan: DVTp: Heparin  Disposition: Home with home PT.     Query answer: Patient admitted with a history of left renal cancer s/p left nephrectomy can not confirm location of METS only that they were present by history   patient did not receive chemotherapy or radiation for her cancer       45 minutes spent on this discharge

## 2018-07-23 NOTE — DISCHARGE NOTE ADULT - CARE PLAN
Principal Discharge DX:	COPD exacerbation  Goal:	Resolved  Assessment and plan of treatment:	Continue medications as prescribed  Follow up with pulmonologist  Secondary Diagnosis:	Hypertension, unspecified type  Assessment and plan of treatment:	Continue medications as prescribed  Follow up with PCP  Low-sodium diet  AHA Recipes - [email or web address]  Secondary Diagnosis:	Anxiety  Assessment and plan of treatment:	Stable  Continue medications as prescribed  Secondary Diagnosis:	Hypothyroidism, unspecified type  Assessment and plan of treatment:	Stable  Continue medications as prescribed  Secondary Diagnosis:	Hyperlipidemia, unspecified hyperlipidemia type  Assessment and plan of treatment:	Continue medications as prescribed  Follow up with PCP  Heart healthy diet - [email or web address]

## 2018-07-23 NOTE — DISCHARGE NOTE ADULT - CARE PROVIDERS DIRECT ADDRESSES
,[email or web address],DirectAddress_Unknown,DirectAddress_Unknown

## 2018-07-24 ENCOUNTER — APPOINTMENT (OUTPATIENT)
Age: 68
End: 2018-07-24
Payer: MEDICARE

## 2018-07-24 VITALS
RESPIRATION RATE: 18 BRPM | OXYGEN SATURATION: 97 % | BODY MASS INDEX: 46.87 KG/M2 | TEMPERATURE: 98.4 F | HEART RATE: 68 BPM | DIASTOLIC BLOOD PRESSURE: 72 MMHG | SYSTOLIC BLOOD PRESSURE: 94 MMHG

## 2018-07-24 DIAGNOSIS — Z86.69 PERSONAL HISTORY OF OTHER DISEASES OF THE NERVOUS SYSTEM AND SENSE ORGANS: ICD-10-CM

## 2018-07-24 DIAGNOSIS — Z86.39 PERSONAL HISTORY OF OTHER ENDOCRINE, NUTRITIONAL AND METABOLIC DISEASE: ICD-10-CM

## 2018-07-24 DIAGNOSIS — Z86.79 PERSONAL HISTORY OF OTHER DISEASES OF THE CIRCULATORY SYSTEM: ICD-10-CM

## 2018-07-24 PROCEDURE — 99348 HOME/RES VST EST LOW MDM 30: CPT

## 2018-07-24 RX ORDER — TORSEMIDE 20 MG/1
20 TABLET ORAL
Qty: 90 | Refills: 0 | Status: DISCONTINUED | COMMUNITY
Start: 2018-06-01 | End: 2018-07-24

## 2018-07-24 RX ORDER — LEVOTHYROXINE SODIUM 0.12 MG/1
125 TABLET ORAL
Qty: 90 | Refills: 0 | Status: ACTIVE | COMMUNITY
Start: 2018-04-14

## 2018-07-24 RX ORDER — ATORVASTATIN CALCIUM 40 MG/1
40 TABLET, FILM COATED ORAL
Qty: 90 | Refills: 0 | Status: ACTIVE | COMMUNITY
Start: 2018-06-30

## 2018-07-24 RX ORDER — IPRATROPIUM/ALBUTEROL SULFATE 18-103MCG
3 AEROSOL WITH ADAPTER (GRAM) INHALATION
Qty: 100 | Refills: 0 | OUTPATIENT
Start: 2018-07-24

## 2018-07-26 DIAGNOSIS — E05.00 THYROTOXICOSIS WITH DIFFUSE GOITER WITHOUT THYROTOXIC CRISIS OR STORM: ICD-10-CM

## 2018-07-26 DIAGNOSIS — G47.30 SLEEP APNEA, UNSPECIFIED: ICD-10-CM

## 2018-07-26 DIAGNOSIS — E66.9 OBESITY, UNSPECIFIED: ICD-10-CM

## 2018-07-26 DIAGNOSIS — I10 ESSENTIAL (PRIMARY) HYPERTENSION: ICD-10-CM

## 2018-07-26 DIAGNOSIS — F41.9 ANXIETY DISORDER, UNSPECIFIED: ICD-10-CM

## 2018-07-26 DIAGNOSIS — Z90.5 ACQUIRED ABSENCE OF KIDNEY: ICD-10-CM

## 2018-07-26 DIAGNOSIS — Z79.52 LONG TERM (CURRENT) USE OF SYSTEMIC STEROIDS: ICD-10-CM

## 2018-07-26 DIAGNOSIS — E87.5 HYPERKALEMIA: ICD-10-CM

## 2018-07-26 DIAGNOSIS — Z79.82 LONG TERM (CURRENT) USE OF ASPIRIN: ICD-10-CM

## 2018-07-26 DIAGNOSIS — J45.909 UNSPECIFIED ASTHMA, UNCOMPLICATED: ICD-10-CM

## 2018-07-26 DIAGNOSIS — E03.9 HYPOTHYROIDISM, UNSPECIFIED: ICD-10-CM

## 2018-07-26 DIAGNOSIS — G89.4 CHRONIC PAIN SYNDROME: ICD-10-CM

## 2018-07-26 DIAGNOSIS — Z87.891 PERSONAL HISTORY OF NICOTINE DEPENDENCE: ICD-10-CM

## 2018-07-26 DIAGNOSIS — J44.1 CHRONIC OBSTRUCTIVE PULMONARY DISEASE WITH (ACUTE) EXACERBATION: ICD-10-CM

## 2018-07-26 DIAGNOSIS — E78.5 HYPERLIPIDEMIA, UNSPECIFIED: ICD-10-CM

## 2018-07-26 DIAGNOSIS — Z85.528 PERSONAL HISTORY OF OTHER MALIGNANT NEOPLASM OF KIDNEY: ICD-10-CM

## 2018-07-26 DIAGNOSIS — F13.20 SEDATIVE, HYPNOTIC OR ANXIOLYTIC DEPENDENCE, UNCOMPLICATED: ICD-10-CM

## 2018-07-26 DIAGNOSIS — Z86.718 PERSONAL HISTORY OF OTHER VENOUS THROMBOSIS AND EMBOLISM: ICD-10-CM

## 2018-07-26 DIAGNOSIS — Z85.3 PERSONAL HISTORY OF MALIGNANT NEOPLASM OF BREAST: ICD-10-CM

## 2018-07-26 DIAGNOSIS — R07.89 OTHER CHEST PAIN: ICD-10-CM

## 2018-07-27 ENCOUNTER — MOBILE ON CALL (OUTPATIENT)
Age: 68
End: 2018-07-27

## 2018-07-31 ENCOUNTER — MOBILE ON CALL (OUTPATIENT)
Age: 68
End: 2018-07-31

## 2018-08-11 ENCOUNTER — EMERGENCY (EMERGENCY)
Facility: HOSPITAL | Age: 68
LOS: 0 days | Discharge: HOME | End: 2018-08-11
Attending: EMERGENCY MEDICINE
Payer: MEDICARE

## 2018-08-11 VITALS
SYSTOLIC BLOOD PRESSURE: 140 MMHG | HEART RATE: 81 BPM | DIASTOLIC BLOOD PRESSURE: 72 MMHG | RESPIRATION RATE: 18 BRPM | HEIGHT: 60 IN | TEMPERATURE: 98 F | WEIGHT: 201.94 LBS | OXYGEN SATURATION: 91 %

## 2018-08-11 VITALS
SYSTOLIC BLOOD PRESSURE: 154 MMHG | HEART RATE: 114 BPM | DIASTOLIC BLOOD PRESSURE: 68 MMHG | OXYGEN SATURATION: 94 % | RESPIRATION RATE: 18 BRPM | TEMPERATURE: 98 F

## 2018-08-11 DIAGNOSIS — Z86.718 PERSONAL HISTORY OF OTHER VENOUS THROMBOSIS AND EMBOLISM: ICD-10-CM

## 2018-08-11 DIAGNOSIS — E66.9 OBESITY, UNSPECIFIED: ICD-10-CM

## 2018-08-11 DIAGNOSIS — J45.909 UNSPECIFIED ASTHMA, UNCOMPLICATED: ICD-10-CM

## 2018-08-11 DIAGNOSIS — F41.9 ANXIETY DISORDER, UNSPECIFIED: ICD-10-CM

## 2018-08-11 DIAGNOSIS — Z85.3 PERSONAL HISTORY OF MALIGNANT NEOPLASM OF BREAST: ICD-10-CM

## 2018-08-11 DIAGNOSIS — J44.1 CHRONIC OBSTRUCTIVE PULMONARY DISEASE WITH (ACUTE) EXACERBATION: ICD-10-CM

## 2018-08-11 DIAGNOSIS — Z79.82 LONG TERM (CURRENT) USE OF ASPIRIN: ICD-10-CM

## 2018-08-11 DIAGNOSIS — Z98.89 OTHER SPECIFIED POSTPROCEDURAL STATES: Chronic | ICD-10-CM

## 2018-08-11 DIAGNOSIS — R07.9 CHEST PAIN, UNSPECIFIED: ICD-10-CM

## 2018-08-11 DIAGNOSIS — G56.01 CARPAL TUNNEL SYNDROME, RIGHT UPPER LIMB: ICD-10-CM

## 2018-08-11 DIAGNOSIS — Z90.5 ACQUIRED ABSENCE OF KIDNEY: ICD-10-CM

## 2018-08-11 DIAGNOSIS — Z90.5 ACQUIRED ABSENCE OF KIDNEY: Chronic | ICD-10-CM

## 2018-08-11 DIAGNOSIS — E89.0 POSTPROCEDURAL HYPOTHYROIDISM: ICD-10-CM

## 2018-08-11 DIAGNOSIS — Z90.49 ACQUIRED ABSENCE OF OTHER SPECIFIED PARTS OF DIGESTIVE TRACT: ICD-10-CM

## 2018-08-11 DIAGNOSIS — E05.00 THYROTOXICOSIS WITH DIFFUSE GOITER WITHOUT THYROTOXIC CRISIS OR STORM: ICD-10-CM

## 2018-08-11 DIAGNOSIS — Z90.49 ACQUIRED ABSENCE OF OTHER SPECIFIED PARTS OF DIGESTIVE TRACT: Chronic | ICD-10-CM

## 2018-08-11 DIAGNOSIS — G47.30 SLEEP APNEA, UNSPECIFIED: ICD-10-CM

## 2018-08-11 DIAGNOSIS — E03.9 HYPOTHYROIDISM, UNSPECIFIED: ICD-10-CM

## 2018-08-11 DIAGNOSIS — R06.2 WHEEZING: ICD-10-CM

## 2018-08-11 DIAGNOSIS — E78.5 HYPERLIPIDEMIA, UNSPECIFIED: ICD-10-CM

## 2018-08-11 DIAGNOSIS — Z85.528 PERSONAL HISTORY OF OTHER MALIGNANT NEOPLASM OF KIDNEY: ICD-10-CM

## 2018-08-11 LAB
ALBUMIN SERPL ELPH-MCNC: 3.2 G/DL — LOW (ref 3.3–5)
ALP SERPL-CCNC: 87 U/L — SIGNIFICANT CHANGE UP (ref 40–120)
ALT FLD-CCNC: 30 U/L — SIGNIFICANT CHANGE UP (ref 12–78)
ANION GAP SERPL CALC-SCNC: 11 MMOL/L — SIGNIFICANT CHANGE UP (ref 5–17)
AST SERPL-CCNC: 16 U/L — SIGNIFICANT CHANGE UP (ref 15–37)
BASOPHILS # BLD AUTO: 0.03 K/UL — SIGNIFICANT CHANGE UP (ref 0–0.2)
BASOPHILS NFR BLD AUTO: 0.3 % — SIGNIFICANT CHANGE UP (ref 0–2)
BILIRUB SERPL-MCNC: 0.7 MG/DL — SIGNIFICANT CHANGE UP (ref 0.2–1.2)
BUN SERPL-MCNC: 16 MG/DL — SIGNIFICANT CHANGE UP (ref 7–23)
CALCIUM SERPL-MCNC: 9.9 MG/DL — SIGNIFICANT CHANGE UP (ref 8.5–10.1)
CHLORIDE SERPL-SCNC: 103 MMOL/L — SIGNIFICANT CHANGE UP (ref 96–108)
CK MB BLD-MCNC: 3.9 % — HIGH (ref 0–3.5)
CK MB CFR SERPL CALC: 1.5 NG/ML — SIGNIFICANT CHANGE UP (ref 0.5–3.6)
CK SERPL-CCNC: 38 U/L — SIGNIFICANT CHANGE UP (ref 26–192)
CO2 SERPL-SCNC: 26 MMOL/L — SIGNIFICANT CHANGE UP (ref 22–31)
CREAT SERPL-MCNC: 0.92 MG/DL — SIGNIFICANT CHANGE UP (ref 0.5–1.3)
EOSINOPHIL # BLD AUTO: 0.15 K/UL — SIGNIFICANT CHANGE UP (ref 0–0.5)
EOSINOPHIL NFR BLD AUTO: 1.3 % — SIGNIFICANT CHANGE UP (ref 0–6)
GLUCOSE SERPL-MCNC: 100 MG/DL — HIGH (ref 70–99)
HCT VFR BLD CALC: 39.6 % — SIGNIFICANT CHANGE UP (ref 34.5–45)
HGB BLD-MCNC: 12.9 G/DL — SIGNIFICANT CHANGE UP (ref 11.5–15.5)
IMM GRANULOCYTES NFR BLD AUTO: 0.9 % — SIGNIFICANT CHANGE UP (ref 0–1.5)
LIDOCAIN IGE QN: 51 U/L — LOW (ref 73–393)
LYMPHOCYTES # BLD AUTO: 1.48 K/UL — SIGNIFICANT CHANGE UP (ref 1–3.3)
LYMPHOCYTES # BLD AUTO: 13.1 % — SIGNIFICANT CHANGE UP (ref 13–44)
MCHC RBC-ENTMCNC: 31.7 PG — SIGNIFICANT CHANGE UP (ref 27–34)
MCHC RBC-ENTMCNC: 32.6 GM/DL — SIGNIFICANT CHANGE UP (ref 32–36)
MCV RBC AUTO: 97.3 FL — SIGNIFICANT CHANGE UP (ref 80–100)
MONOCYTES # BLD AUTO: 1.22 K/UL — HIGH (ref 0–0.9)
MONOCYTES NFR BLD AUTO: 10.8 % — SIGNIFICANT CHANGE UP (ref 2–14)
NEUTROPHILS # BLD AUTO: 8.34 K/UL — HIGH (ref 1.8–7.4)
NEUTROPHILS NFR BLD AUTO: 73.6 % — SIGNIFICANT CHANGE UP (ref 43–77)
NRBC # BLD: 0 /100 WBCS — SIGNIFICANT CHANGE UP (ref 0–0)
PLATELET # BLD AUTO: 200 K/UL — SIGNIFICANT CHANGE UP (ref 150–400)
POTASSIUM SERPL-MCNC: 4.5 MMOL/L — SIGNIFICANT CHANGE UP (ref 3.5–5.3)
POTASSIUM SERPL-SCNC: 4.5 MMOL/L — SIGNIFICANT CHANGE UP (ref 3.5–5.3)
PROT SERPL-MCNC: 7.4 GM/DL — SIGNIFICANT CHANGE UP (ref 6–8.3)
RBC # BLD: 4.07 M/UL — SIGNIFICANT CHANGE UP (ref 3.8–5.2)
RBC # FLD: 14.6 % — HIGH (ref 10.3–14.5)
SODIUM SERPL-SCNC: 140 MMOL/L — SIGNIFICANT CHANGE UP (ref 135–145)
TROPONIN I SERPL-MCNC: <.015 NG/ML — SIGNIFICANT CHANGE UP (ref 0.01–0.04)
WBC # BLD: 11.32 K/UL — HIGH (ref 3.8–10.5)
WBC # FLD AUTO: 11.32 K/UL — HIGH (ref 3.8–10.5)

## 2018-08-11 PROCEDURE — 99284 EMERGENCY DEPT VISIT MOD MDM: CPT

## 2018-08-11 RX ORDER — MAGNESIUM SULFATE 500 MG/ML
2 VIAL (ML) INJECTION ONCE
Qty: 0 | Refills: 0 | Status: COMPLETED | OUTPATIENT
Start: 2018-08-11 | End: 2018-08-11

## 2018-08-11 RX ORDER — IPRATROPIUM/ALBUTEROL SULFATE 18-103MCG
3 AEROSOL WITH ADAPTER (GRAM) INHALATION
Qty: 0 | Refills: 0 | Status: COMPLETED | OUTPATIENT
Start: 2018-08-11 | End: 2018-08-11

## 2018-08-11 RX ORDER — CEPHALEXIN 500 MG
10 CAPSULE ORAL
Qty: 210 | Refills: 0
Start: 2018-08-11 | End: 2018-08-17

## 2018-08-11 RX ADMIN — Medication 125 MILLIGRAM(S): at 17:57

## 2018-08-11 RX ADMIN — Medication 2 GRAM(S): at 19:08

## 2018-08-11 RX ADMIN — Medication 3 MILLILITER(S): at 17:26

## 2018-08-11 RX ADMIN — Medication 3 MILLILITER(S): at 17:57

## 2018-08-11 RX ADMIN — Medication 50 GRAM(S): at 17:57

## 2018-08-11 RX ADMIN — Medication 3 MILLILITER(S): at 18:32

## 2018-08-11 NOTE — ED ADULT NURSE NOTE - NSIMPLEMENTINTERV_GEN_ALL_ED
Implemented All Fall with Harm Risk Interventions:  [place] to call system. Call bell, personal items and telephone within reach. Instruct patient to call for assistance. Room bathroom lighting operational. Non-slip footwear when patient is off stretcher. Physically safe environment: no spills, clutter or unnecessary equipment. Stretcher in lowest position, wheels locked, appropriate side rails in place. Provide visual cue, wrist band, yellow gown, etc. Monitor gait and stability. Monitor for mental status changes and reorient to person, place, and time. Review medications for side effects contributing to fall risk. Reinforce activity limits and safety measures with patient and family. Provide visual clues: red socks.

## 2018-08-11 NOTE — ED ADULT NURSE REASSESSMENT NOTE - NS ED NURSE REASSESS COMMENT FT1
66 yo female reports feeling much improved s/p medication administration and breathing medication. pt requesting oxygen to be delivered to her home.

## 2018-08-11 NOTE — ED PROVIDER NOTE - ATTENDING CONTRIBUTION TO CARE
I have seen the patient with the PA and agree with above examination and assessment and plan with the following addendum:        Focused PE:   General: NAD, alert and oriented  Head: Normocephalic, atraumatic  Eyes: PERRLA, EOMI  Cardiac: RRR, no murmurs, rubs or gallops  Resp: CTA, no wheezes, rales or ronchi  GI: Nondistended, nontender, no rebound or guarding  Psych: Anxious, at baseline from prior visits  Neuro: Alert and oriented, no focal deficits. Normal gait  Ext: Non edematous, nontender. I have seen the patient with the PA and agree with above examination and assessment and plan with the following addendum:        Focused PE:   General: NAD, alert and oriented  Head: Normocephalic, atraumatic  Eyes: PERRLA, EOMI  Cardiac: RRR, no murmurs, rubs or gallops  Resp: CTA, no wheezes, rales or ronchi  GI: Nondistended, nontender, no rebound or guarding  Psych: Anxious, at baseline from prior visits  Neuro: Alert and oriented, no focal deficits. Normal gait  Ext: Non edematous, nontender. Has some erythema on bilateral lower legs, warmth. Nontender.

## 2018-08-11 NOTE — ED ADULT NURSE NOTE - NS ED NURSE DC INFO COMPLEXITY
Moderate: Comprehensive teaching/Patient asked questions/Verbalized Understanding/Returned Demonstration

## 2018-08-11 NOTE — ED ADULT TRIAGE NOTE - CHIEF COMPLAINT QUOTE
tightness to her chest woke up with this and  it went away. When her son came I her room with EEG apparatus the tightness return. On the scene speaking full sentences and her lungs clear. When he left the room her symptoms went away

## 2018-08-11 NOTE — ED ADULT NURSE NOTE - FAMILY HISTORY
Family history of myocardial infarction, mother  at age 67 and father at age 67 of MI's     Family history of hypertension, mother and father     Sibling  Still living? Yes, Estimated age: Age Unknown  Family history of diabetes mellitus, Age at diagnosis: Age Unknown  Family history of heart disease, Age at diagnosis: Age Unknown     Child  Still living? Yes, Estimated age: Age Unknown  Family history of thyroid cancer, Age at diagnosis: Age Unknown

## 2018-08-11 NOTE — ED ADULT NURSE NOTE - OBJECTIVE STATEMENT
68 yo female c/o chest tightness, sob, r/o asthma exacerbation , pt report's 'I woke up feeling like this I took my nebulaizer x 8 times but didn't work, and prednison, 4 teaspoons of prednisone. pt speaking in complete sentences. 66 yo female c/o chest tightness, sob, r/o asthma exacerbation , pt report's 'I woke up feeling like this I took my nebulizer x 8 times but didn't work, and prednisone, 4 teaspoons of prednisone. pt speaking in complete sentences. pt reports' she is wheelchair bound and pmh asthma and copd

## 2018-08-11 NOTE — ED PROVIDER NOTE - OBJECTIVE STATEMENT
Pt is a 67 year old female with a PMH  of HTN, obesity, wheelchair bound, HLD, COPD, Asthma, Anxiety, Breast Cancer Left, Graves Disease, hyperthyroidism, Kidney cancer primary with mets, Sleep Apnea cervical disx surgery who presents typical wheezing and chest pressure associated form copd. Denies nausea, vomiting, chest pain, sob, recent  travel, recent surgery. Pt took sevral albuterol and prednisone 20mg liquid early. she talking in full sentences and never intubated before. Pt was hospitalize for copd last month

## 2018-08-25 ENCOUNTER — MOBILE ON CALL (OUTPATIENT)
Age: 68
End: 2018-08-25

## 2018-08-27 ENCOUNTER — EMERGENCY (EMERGENCY)
Facility: HOSPITAL | Age: 68
LOS: 0 days | Discharge: HOME | End: 2018-08-27
Attending: EMERGENCY MEDICINE
Payer: MEDICARE

## 2018-08-27 VITALS
WEIGHT: 220.02 LBS | HEART RATE: 90 BPM | TEMPERATURE: 98 F | OXYGEN SATURATION: 95 % | SYSTOLIC BLOOD PRESSURE: 136 MMHG | RESPIRATION RATE: 17 BRPM | DIASTOLIC BLOOD PRESSURE: 94 MMHG | HEIGHT: 60 IN

## 2018-08-27 DIAGNOSIS — E78.5 HYPERLIPIDEMIA, UNSPECIFIED: ICD-10-CM

## 2018-08-27 DIAGNOSIS — Z90.49 ACQUIRED ABSENCE OF OTHER SPECIFIED PARTS OF DIGESTIVE TRACT: Chronic | ICD-10-CM

## 2018-08-27 DIAGNOSIS — J44.9 CHRONIC OBSTRUCTIVE PULMONARY DISEASE, UNSPECIFIED: ICD-10-CM

## 2018-08-27 DIAGNOSIS — Z85.3 PERSONAL HISTORY OF MALIGNANT NEOPLASM OF BREAST: ICD-10-CM

## 2018-08-27 DIAGNOSIS — G47.30 SLEEP APNEA, UNSPECIFIED: ICD-10-CM

## 2018-08-27 DIAGNOSIS — F41.9 ANXIETY DISORDER, UNSPECIFIED: ICD-10-CM

## 2018-08-27 DIAGNOSIS — Z98.89 OTHER SPECIFIED POSTPROCEDURAL STATES: Chronic | ICD-10-CM

## 2018-08-27 DIAGNOSIS — Z85.528 PERSONAL HISTORY OF OTHER MALIGNANT NEOPLASM OF KIDNEY: ICD-10-CM

## 2018-08-27 DIAGNOSIS — E05.00 THYROTOXICOSIS WITH DIFFUSE GOITER WITHOUT THYROTOXIC CRISIS OR STORM: ICD-10-CM

## 2018-08-27 DIAGNOSIS — E03.9 HYPOTHYROIDISM, UNSPECIFIED: ICD-10-CM

## 2018-08-27 DIAGNOSIS — Z90.5 ACQUIRED ABSENCE OF KIDNEY: Chronic | ICD-10-CM

## 2018-08-27 DIAGNOSIS — E66.9 OBESITY, UNSPECIFIED: ICD-10-CM

## 2018-08-27 DIAGNOSIS — Z90.5 ACQUIRED ABSENCE OF KIDNEY: ICD-10-CM

## 2018-08-27 DIAGNOSIS — Z90.49 ACQUIRED ABSENCE OF OTHER SPECIFIED PARTS OF DIGESTIVE TRACT: ICD-10-CM

## 2018-08-27 DIAGNOSIS — J45.909 UNSPECIFIED ASTHMA, UNCOMPLICATED: ICD-10-CM

## 2018-08-27 DIAGNOSIS — Z86.718 PERSONAL HISTORY OF OTHER VENOUS THROMBOSIS AND EMBOLISM: ICD-10-CM

## 2018-08-27 PROCEDURE — 99284 EMERGENCY DEPT VISIT MOD MDM: CPT

## 2018-08-27 RX ORDER — IPRATROPIUM/ALBUTEROL SULFATE 18-103MCG
3 AEROSOL WITH ADAPTER (GRAM) INHALATION ONCE
Qty: 0 | Refills: 0 | Status: COMPLETED | OUTPATIENT
Start: 2018-08-27 | End: 2018-08-27

## 2018-08-27 RX ADMIN — Medication 1 MILLIGRAM(S): at 11:37

## 2018-08-27 RX ADMIN — Medication 3 MILLILITER(S): at 11:03

## 2018-08-27 NOTE — ED PROVIDER NOTE - MEDICAL DECISION MAKING DETAILS
Ddx: Anxiety/ no evidence of benzodiazepine withdrawal  Plan: temporary prescription until standing prescription can be refilled later this week

## 2018-08-27 NOTE — ED ADULT TRIAGE NOTE - CHIEF COMPLAINT QUOTE
Pt c/o feeling anxious since not taking lorazepam for 2 days. Pt shaking, family states she is having withdrawal. Pt c/o chronic upper back pain

## 2018-08-27 NOTE — ED ADULT NURSE NOTE - NSIMPLEMENTINTERV_GEN_ALL_ED
Implemented All Fall Risk Interventions:  [place] to call system. Call bell, personal items and telephone within reach. Instruct patient to call for assistance. Room bathroom lighting operational. Non-slip footwear when patient is off stretcher. Physically safe environment: no spills, clutter or unnecessary equipment. Stretcher in lowest position, wheels locked, appropriate side rails in place. Provide visual cue, wrist band, yellow gown, etc. Monitor gait and stability. Monitor for mental status changes and reorient to person, place, and time. Review medications for side effects contributing to fall risk. Reinforce activity limits and safety measures with patient and family.

## 2018-08-27 NOTE — ED ADULT NURSE NOTE - OBJECTIVE STATEMENT
Reports having ran out of ativan, last dose was this morning, reports taking ativan 1mg four times a day, reports pmd is on vacation and refill is due sat.

## 2018-08-27 NOTE — ED PROVIDER NOTE - OBJECTIVE STATEMENT
Pt is a 68 yo lady with a pmhx of HTN, obesity, Hl, COPD, hyperthyroidism, anxiety who presents to the ED with anxiety after running out of her ativan. Her next dose refill will kick in on Saturday. Denies any other physical symptoms. No chest pain, no sob, no n/v/d, just is feeling very anxious. No seizures, is not shaking.

## 2018-08-29 ENCOUNTER — CHART COPY (OUTPATIENT)
Age: 68
End: 2018-08-29

## 2018-09-13 ENCOUNTER — EMERGENCY (EMERGENCY)
Facility: HOSPITAL | Age: 68
LOS: 1 days | Discharge: HOME | End: 2018-09-13
Attending: EMERGENCY MEDICINE | Admitting: EMERGENCY MEDICINE
Payer: MEDICARE

## 2018-09-13 VITALS
OXYGEN SATURATION: 98 % | TEMPERATURE: 99 F | HEART RATE: 78 BPM | DIASTOLIC BLOOD PRESSURE: 71 MMHG | RESPIRATION RATE: 22 BRPM | SYSTOLIC BLOOD PRESSURE: 176 MMHG

## 2018-09-13 VITALS — OXYGEN SATURATION: 99 % | RESPIRATION RATE: 18 BRPM

## 2018-09-13 DIAGNOSIS — Z98.89 OTHER SPECIFIED POSTPROCEDURAL STATES: Chronic | ICD-10-CM

## 2018-09-13 DIAGNOSIS — Z90.5 ACQUIRED ABSENCE OF KIDNEY: Chronic | ICD-10-CM

## 2018-09-13 DIAGNOSIS — Z90.49 ACQUIRED ABSENCE OF OTHER SPECIFIED PARTS OF DIGESTIVE TRACT: Chronic | ICD-10-CM

## 2018-09-13 LAB
ALBUMIN SERPL ELPH-MCNC: 3.9 G/DL — SIGNIFICANT CHANGE UP (ref 3.3–5)
ALP SERPL-CCNC: 104 U/L — SIGNIFICANT CHANGE UP (ref 40–120)
ALT FLD-CCNC: 20 U/L — SIGNIFICANT CHANGE UP (ref 4–33)
AST SERPL-CCNC: 13 U/L — SIGNIFICANT CHANGE UP (ref 4–32)
BASE EXCESS BLDV CALC-SCNC: 1.2 MMOL/L — SIGNIFICANT CHANGE UP
BASOPHILS # BLD AUTO: 0.05 K/UL — SIGNIFICANT CHANGE UP (ref 0–0.2)
BASOPHILS NFR BLD AUTO: 0.4 % — SIGNIFICANT CHANGE UP (ref 0–2)
BILIRUB SERPL-MCNC: 0.6 MG/DL — SIGNIFICANT CHANGE UP (ref 0.2–1.2)
BLOOD GAS VENOUS - CREATININE: 0.8 MG/DL — SIGNIFICANT CHANGE UP (ref 0.5–1.3)
BUN SERPL-MCNC: 23 MG/DL — SIGNIFICANT CHANGE UP (ref 7–23)
CALCIUM SERPL-MCNC: 10 MG/DL — SIGNIFICANT CHANGE UP (ref 8.4–10.5)
CHLORIDE BLDV-SCNC: 110 MMOL/L — HIGH (ref 96–108)
CHLORIDE SERPL-SCNC: 102 MMOL/L — SIGNIFICANT CHANGE UP (ref 98–107)
CO2 SERPL-SCNC: 24 MMOL/L — SIGNIFICANT CHANGE UP (ref 22–31)
CREAT SERPL-MCNC: 0.86 MG/DL — SIGNIFICANT CHANGE UP (ref 0.5–1.3)
EOSINOPHIL # BLD AUTO: 0.22 K/UL — SIGNIFICANT CHANGE UP (ref 0–0.5)
EOSINOPHIL NFR BLD AUTO: 1.8 % — SIGNIFICANT CHANGE UP (ref 0–6)
GAS PNL BLDV: 132 MMOL/L — LOW (ref 136–146)
GLUCOSE BLDV-MCNC: 117 — HIGH (ref 70–99)
GLUCOSE SERPL-MCNC: 115 MG/DL — HIGH (ref 70–99)
HCO3 BLDV-SCNC: 25 MMOL/L — SIGNIFICANT CHANGE UP (ref 20–27)
HCT VFR BLD CALC: 41.7 % — SIGNIFICANT CHANGE UP (ref 34.5–45)
HCT VFR BLDV CALC: 41.1 % — SIGNIFICANT CHANGE UP (ref 34.5–45)
HGB BLD-MCNC: 13.2 G/DL — SIGNIFICANT CHANGE UP (ref 11.5–15.5)
HGB BLDV-MCNC: 13.4 G/DL — SIGNIFICANT CHANGE UP (ref 11.5–15.5)
IMM GRANULOCYTES # BLD AUTO: 0.08 # — SIGNIFICANT CHANGE UP
IMM GRANULOCYTES NFR BLD AUTO: 0.7 % — SIGNIFICANT CHANGE UP (ref 0–1.5)
LACTATE BLDV-MCNC: 2 MMOL/L — SIGNIFICANT CHANGE UP (ref 0.5–2)
LYMPHOCYTES # BLD AUTO: 1.2 K/UL — SIGNIFICANT CHANGE UP (ref 1–3.3)
LYMPHOCYTES # BLD AUTO: 9.8 % — LOW (ref 13–44)
MCHC RBC-ENTMCNC: 31.7 % — LOW (ref 32–36)
MCHC RBC-ENTMCNC: 31.7 PG — SIGNIFICANT CHANGE UP (ref 27–34)
MCV RBC AUTO: 100 FL — SIGNIFICANT CHANGE UP (ref 80–100)
MONOCYTES # BLD AUTO: 0.78 K/UL — SIGNIFICANT CHANGE UP (ref 0–0.9)
MONOCYTES NFR BLD AUTO: 6.4 % — SIGNIFICANT CHANGE UP (ref 2–14)
NEUTROPHILS # BLD AUTO: 9.93 K/UL — HIGH (ref 1.8–7.4)
NEUTROPHILS NFR BLD AUTO: 80.9 % — HIGH (ref 43–77)
NRBC # FLD: 0 — SIGNIFICANT CHANGE UP
PCO2 BLDV: 45 MMHG — SIGNIFICANT CHANGE UP (ref 41–51)
PH BLDV: 7.38 PH — SIGNIFICANT CHANGE UP (ref 7.32–7.43)
PLATELET # BLD AUTO: 182 K/UL — SIGNIFICANT CHANGE UP (ref 150–400)
PMV BLD: 11 FL — SIGNIFICANT CHANGE UP (ref 7–13)
PO2 BLDV: 63 MMHG — HIGH (ref 35–40)
POTASSIUM BLDV-SCNC: 8 MMOL/L — CRITICAL HIGH (ref 3.4–4.5)
POTASSIUM SERPL-MCNC: 5.8 MMOL/L — HIGH (ref 3.5–5.3)
POTASSIUM SERPL-SCNC: 5.8 MMOL/L — HIGH (ref 3.5–5.3)
PROT SERPL-MCNC: 7 G/DL — SIGNIFICANT CHANGE UP (ref 6–8.3)
RBC # BLD: 4.17 M/UL — SIGNIFICANT CHANGE UP (ref 3.8–5.2)
RBC # FLD: 14.5 % — SIGNIFICANT CHANGE UP (ref 10.3–14.5)
SAO2 % BLDV: 91.5 % — HIGH (ref 60–85)
SODIUM SERPL-SCNC: 139 MMOL/L — SIGNIFICANT CHANGE UP (ref 135–145)
TROPONIN T, HIGH SENSITIVITY: 36 NG/L — SIGNIFICANT CHANGE UP (ref ?–14)
WBC # BLD: 12.26 K/UL — HIGH (ref 3.8–10.5)
WBC # FLD AUTO: 12.26 K/UL — HIGH (ref 3.8–10.5)

## 2018-09-13 PROCEDURE — 71045 X-RAY EXAM CHEST 1 VIEW: CPT | Mod: 26

## 2018-09-13 PROCEDURE — 99285 EMERGENCY DEPT VISIT HI MDM: CPT

## 2018-09-13 RX ORDER — IPRATROPIUM/ALBUTEROL SULFATE 18-103MCG
3 AEROSOL WITH ADAPTER (GRAM) INHALATION ONCE
Qty: 0 | Refills: 0 | Status: COMPLETED | OUTPATIENT
Start: 2018-09-13 | End: 2018-09-13

## 2018-09-13 RX ADMIN — Medication 125 MILLIGRAM(S): at 15:00

## 2018-09-13 RX ADMIN — Medication 3 MILLILITER(S): at 15:00

## 2018-09-13 RX ADMIN — Medication 1 MILLIGRAM(S): at 16:16

## 2018-09-13 NOTE — ED PROVIDER NOTE - PROGRESS NOTE DETAILS
- MD Jina,PhD - Resident: Called her pulmonologist Dr. BEDOLLA, Dr. BEDOLLA states that this is a very common presentation for this patient, that she goes to 1-2 ERs per month for shortness of breath but it is usually related to panic attacks. CTAs for PEs are negative. She often does not send her home on steroids because it is not related to her lungs. Today's presentation is consistent with Dr. BEDOLLA's description of her panic attacks because patient speaking in full sentences, vital signs not suggestive of PE (normotensive and not tachycardic). will continue to treat with duonebs and steroids while here, if troponins are negative will dc home with PCP Follow up this week. - MD Jina,PhD - Resident: patient reassessed, feels much better, will dc home with pcp follow up. CXR and EKG and blood work reassuring. - MD Jina,PhD - Resident: Called pulmonologist Dr. BEDOLLA, Dr. BEDOLLA states that this is a very common presentation for this patient, that she goes to 1-2 ERs per month for shortness of breath but it is usually related to panic attacks. CTAs for PEs are negative. She often does not send her home on steroids because it is not related to her lungs. Today's presentation is consistent with Dr. BEDOLLA's description of her panic attacks because patient speaking in full sentences, vital signs not suggestive of PE (normotensive and not tachycardic). will continue to treat with duonebs and steroids while here, if troponins are negative will dc home with PCP Follow up this week.

## 2018-09-13 NOTE — ED PROVIDER NOTE - PLAN OF CARE
You were seen in the ER for shortness of breath. You must follow up with your primary physician in 24 to 48 hours. Return to the ER for any new or worsening signs/symptoms. See handout for additional reasons to return to the ER.

## 2018-09-13 NOTE — ED PROVIDER NOTE - NS ED ROS FT
GENERAL: No fever or chills, //             EYES: no change in vision, //             HEENT: no trouble swallowing or speaking, //                  GI: no abdominal pain, no nausea or no vomiting, no diarrhea or constipation, //             : No changes in urination,  //            SKIN: no rashes,  //            NEURO: no headache,  //             MSK: No joint pain otherwise as HPI or negative. ~Nino Muro M.D., Ph.D. -Resident
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
## 2018-09-13 NOTE — ED ADULT NURSE NOTE - CHIEF COMPLAINT QUOTE
c/o difficulty breathing c/o asthma exacerbation pt history of COPD asthma c/o chest tightness pt paraplegic since spinal surgery in 2015

## 2018-09-13 NOTE — ED PROVIDER NOTE - PHYSICAL EXAMINATION
Gen: NAD, AOx3, non-toxic //            Head: NCAT //            HEENT: EOMI, oral mucosa moist, normal conjunctiva //            Lung: CTAB, no respiratory distress, no wheezes/rhonchi/rales B/L, speaking in full sentences. //            CV: RRR, no murmurs, rubs or gallops //            Abd: soft, NTND, no guarding, no CVA tenderness //            MSK: bilateral lower extremity edema, symmetric   //            Neuro: lower extremity paraplegia, otherwise WINL.  //            Skin: Warm, well perfused, no rash //            Psych: normal affect. ~Nino Muro M.D., Ph.D. -Resident

## 2018-09-13 NOTE — ED ADULT TRIAGE NOTE - CHIEF COMPLAINT QUOTE
c/o difficulty breathing c/o asthma exacerbation pt history of COPD asthma c/o chest tightness c/o difficulty breathing c/o asthma exacerbation pt history of COPD asthma c/o chest tightness pt paraplegic since spinal surgery in 2015

## 2018-09-13 NOTE — ED ADULT NURSE NOTE - OBJECTIVE STATEMENT
Patient reports difficulty breathing that began 30 minutes ago while patient was receiving out-patient MRI d/t tremors. Patient pulse ox currently >96%, hx of COPD and Asthma. Patient's BS slight wheezes on auscultation. Patient also reports hx of anxiety takes Ativan 4x/day. Patient reports taking doses today. Patient reports taking nebulizer treatment without relief. Patient's labs drawn, IV solumedrol given, and duoneb treatment in progress. Patient to be reassessed post treatment.

## 2018-09-13 NOTE — ED PROVIDER NOTE - ATTENDING CONTRIBUTION TO CARE
Dr. Murray: 68 yo female with PMH COPD, asthma, breast/kidney cancer, anxiety, NOEMY, wheelchair bound s/p laminectomy 19 years ago, in ED wafter episode of SOB occurring shortly before pt to receive MRI for outpatient workup of new body tremor.  No CP, N/V/D or abdominal pain. On exam pt anxious appearing, tearful at times, heart RRR, lungs CTAB, abd NTND, LEs both 2+ pitting edema (at baseline as per pt), strength 5/5 in all extremities when laying in bed and skin without rash.  +fine arm and leg tremor

## 2018-09-13 NOTE — ED ADULT NURSE NOTE - CHPI ED NUR SYMPTOMS NEG
no shortness of breath/no wheezing/no chills/no hemoptysis/no headache/no fever/no body aches/no chest pain

## 2018-09-13 NOTE — ED ADULT NURSE NOTE - NSIMPLEMENTINTERV_GEN_ALL_ED
Implemented All Fall Risk Interventions:  [place] to call system. Call bell, personal items and telephone within reach. Instruct patient to call for assistance. Room bathroom lighting operational. Non-slip footwear when patient is off stretcher. Physically safe environment: no spills, clutter or unnecessary equipment. Stretcher in lowest position, wheels locked, appropriate side rails in place. Provide visual cue, wrist band, yellow gown, etc. Monitor gait and stability. Monitor for mental status changes and reorient to person, place, and time. Review medications for side effects contributing to fall risk. Reinforce activity limits and safety measures with patient and family.

## 2018-09-13 NOTE — ED PROVIDER NOTE - CARE PLAN
Principal Discharge DX:	Panic attack  Secondary Diagnosis:	Shortness of breath Principal Discharge DX:	Panic attack  Assessment and plan of treatment:	You were seen in the ER for shortness of breath. You must follow up with your primary physician in 24 to 48 hours. Return to the ER for any new or worsening signs/symptoms. See handout for additional reasons to return to the ER.  Secondary Diagnosis:	Shortness of breath Principal Discharge DX:	Shortness of breath  Assessment and plan of treatment:	You were seen in the ER for shortness of breath. You must follow up with your primary physician in 24 to 48 hours. Return to the ER for any new or worsening signs/symptoms. See handout for additional reasons to return to the ER.

## 2018-10-09 NOTE — ED ADULT TRIAGE NOTE - NS ED NURSE AMBULANCES
[facility] Abdomen soft, nontender, nondistended, bowel sounds present in all 4 quadrants.

## 2019-01-14 NOTE — H&P ADULT. - FAMILY HISTORY
PATIENT L/M REQUESTING REFILL SON B/P MEDS. PER CHART LOSARTAN ORDERED DAILY BUT TAKES BID. OK PER TANA TORRES NP TO ORDER BID. REFILLS ELECTRO'D YESSICA,LPN   <<-----Click on this checkbox to enter Family History Family history of myocardial infarction, mother  at age 67 and father at age 67 of MI's     Family history of hypertension, mother and father     Sibling  Still living? Yes, Estimated age: Age Unknown  Family history of diabetes mellitus, Age at diagnosis: Age Unknown  Family history of heart disease, Age at diagnosis: Age Unknown     Child  Still living? Yes, Estimated age: Age Unknown  Family history of thyroid cancer, Age at diagnosis: Age Unknown

## 2019-03-08 NOTE — H&P ADULT. - ATTENDING COMMENTS
Yes NIGHT HOSPITALIST:  Patient / spouse aware of course and agrees with plan/care as above.   Care reviewed with covering NP.  Care assumed by Dr. Welch.

## 2019-03-14 ENCOUNTER — APPOINTMENT (OUTPATIENT)
Dept: PSYCHIATRY | Facility: CLINIC | Age: 69
End: 2019-03-14
Payer: MEDICARE

## 2019-03-14 DIAGNOSIS — F13.20 SEDATIVE, HYPNOTIC OR ANXIOLYTIC DEPENDENCE, UNCOMPLICATED: ICD-10-CM

## 2019-03-14 PROCEDURE — 99215 OFFICE O/P EST HI 40 MIN: CPT

## 2019-04-30 ENCOUNTER — INPATIENT (INPATIENT)
Facility: HOSPITAL | Age: 69
LOS: 6 days | Discharge: NURSING, REHAB OR LONG-TERM CARE | End: 2019-05-07
Attending: GENERAL PRACTICE | Admitting: GENERAL PRACTICE
Payer: MEDICARE

## 2019-04-30 VITALS
RESPIRATION RATE: 22 BRPM | DIASTOLIC BLOOD PRESSURE: 94 MMHG | SYSTOLIC BLOOD PRESSURE: 187 MMHG | HEART RATE: 76 BPM | OXYGEN SATURATION: 98 % | TEMPERATURE: 98 F

## 2019-04-30 DIAGNOSIS — J45.909 UNSPECIFIED ASTHMA, UNCOMPLICATED: ICD-10-CM

## 2019-04-30 DIAGNOSIS — Z90.49 ACQUIRED ABSENCE OF OTHER SPECIFIED PARTS OF DIGESTIVE TRACT: Chronic | ICD-10-CM

## 2019-04-30 DIAGNOSIS — Z98.89 OTHER SPECIFIED POSTPROCEDURAL STATES: Chronic | ICD-10-CM

## 2019-04-30 DIAGNOSIS — R07.89 OTHER CHEST PAIN: ICD-10-CM

## 2019-04-30 DIAGNOSIS — I10 ESSENTIAL (PRIMARY) HYPERTENSION: ICD-10-CM

## 2019-04-30 DIAGNOSIS — F41.9 ANXIETY DISORDER, UNSPECIFIED: ICD-10-CM

## 2019-04-30 DIAGNOSIS — Z90.5 ACQUIRED ABSENCE OF KIDNEY: Chronic | ICD-10-CM

## 2019-04-30 DIAGNOSIS — J44.9 CHRONIC OBSTRUCTIVE PULMONARY DISEASE, UNSPECIFIED: ICD-10-CM

## 2019-04-30 DIAGNOSIS — Z29.9 ENCOUNTER FOR PROPHYLACTIC MEASURES, UNSPECIFIED: ICD-10-CM

## 2019-04-30 DIAGNOSIS — E78.5 HYPERLIPIDEMIA, UNSPECIFIED: ICD-10-CM

## 2019-04-30 LAB
ALBUMIN SERPL ELPH-MCNC: 3.8 G/DL — SIGNIFICANT CHANGE UP (ref 3.3–5)
ALP SERPL-CCNC: 107 U/L — SIGNIFICANT CHANGE UP (ref 40–120)
ALT FLD-CCNC: 20 U/L — SIGNIFICANT CHANGE UP (ref 4–33)
ANION GAP SERPL CALC-SCNC: 13 MMO/L — SIGNIFICANT CHANGE UP (ref 7–14)
AST SERPL-CCNC: 21 U/L — SIGNIFICANT CHANGE UP (ref 4–32)
B PERT DNA SPEC QL NAA+PROBE: NOT DETECTED — SIGNIFICANT CHANGE UP
BASOPHILS # BLD AUTO: 0.05 K/UL — SIGNIFICANT CHANGE UP (ref 0–0.2)
BASOPHILS NFR BLD AUTO: 0.4 % — SIGNIFICANT CHANGE UP (ref 0–2)
BILIRUB SERPL-MCNC: 0.7 MG/DL — SIGNIFICANT CHANGE UP (ref 0.2–1.2)
BUN SERPL-MCNC: 8 MG/DL — SIGNIFICANT CHANGE UP (ref 7–23)
C PNEUM DNA SPEC QL NAA+PROBE: NOT DETECTED — SIGNIFICANT CHANGE UP
CALCIUM SERPL-MCNC: 10.4 MG/DL — SIGNIFICANT CHANGE UP (ref 8.4–10.5)
CHLORIDE SERPL-SCNC: 101 MMOL/L — SIGNIFICANT CHANGE UP (ref 98–107)
CK MB BLD-MCNC: 2.38 NG/ML — SIGNIFICANT CHANGE UP (ref 1–4.7)
CK MB BLD-MCNC: 2.71 NG/ML — SIGNIFICANT CHANGE UP (ref 1–4.7)
CK MB BLD-MCNC: SIGNIFICANT CHANGE UP (ref 0–2.5)
CK MB BLD-MCNC: SIGNIFICANT CHANGE UP (ref 0–2.5)
CK SERPL-CCNC: 38 U/L — SIGNIFICANT CHANGE UP (ref 25–170)
CK SERPL-CCNC: 41 U/L — SIGNIFICANT CHANGE UP (ref 25–170)
CO2 SERPL-SCNC: 29 MMOL/L — SIGNIFICANT CHANGE UP (ref 22–31)
CREAT SERPL-MCNC: 0.63 MG/DL — SIGNIFICANT CHANGE UP (ref 0.5–1.3)
EOSINOPHIL # BLD AUTO: 0.07 K/UL — SIGNIFICANT CHANGE UP (ref 0–0.5)
EOSINOPHIL NFR BLD AUTO: 0.5 % — SIGNIFICANT CHANGE UP (ref 0–6)
FLUAV H1 2009 PAND RNA SPEC QL NAA+PROBE: NOT DETECTED — SIGNIFICANT CHANGE UP
FLUAV H1 RNA SPEC QL NAA+PROBE: NOT DETECTED — SIGNIFICANT CHANGE UP
FLUAV H3 RNA SPEC QL NAA+PROBE: NOT DETECTED — SIGNIFICANT CHANGE UP
FLUAV SUBTYP SPEC NAA+PROBE: NOT DETECTED — SIGNIFICANT CHANGE UP
FLUBV RNA SPEC QL NAA+PROBE: NOT DETECTED — SIGNIFICANT CHANGE UP
GLUCOSE SERPL-MCNC: 93 MG/DL — SIGNIFICANT CHANGE UP (ref 70–99)
HADV DNA SPEC QL NAA+PROBE: NOT DETECTED — SIGNIFICANT CHANGE UP
HCOV PNL SPEC NAA+PROBE: SIGNIFICANT CHANGE UP
HCT VFR BLD CALC: 39.7 % — SIGNIFICANT CHANGE UP (ref 34.5–45)
HGB BLD-MCNC: 12.1 G/DL — SIGNIFICANT CHANGE UP (ref 11.5–15.5)
HMPV RNA SPEC QL NAA+PROBE: NOT DETECTED — SIGNIFICANT CHANGE UP
HPIV1 RNA SPEC QL NAA+PROBE: NOT DETECTED — SIGNIFICANT CHANGE UP
HPIV2 RNA SPEC QL NAA+PROBE: NOT DETECTED — SIGNIFICANT CHANGE UP
HPIV3 RNA SPEC QL NAA+PROBE: NOT DETECTED — SIGNIFICANT CHANGE UP
HPIV4 RNA SPEC QL NAA+PROBE: NOT DETECTED — SIGNIFICANT CHANGE UP
IMM GRANULOCYTES NFR BLD AUTO: 0.3 % — SIGNIFICANT CHANGE UP (ref 0–1.5)
LYMPHOCYTES # BLD AUTO: 15 % — SIGNIFICANT CHANGE UP (ref 13–44)
LYMPHOCYTES # BLD AUTO: 2.11 K/UL — SIGNIFICANT CHANGE UP (ref 1–3.3)
MCHC RBC-ENTMCNC: 27.4 PG — SIGNIFICANT CHANGE UP (ref 27–34)
MCHC RBC-ENTMCNC: 30.5 % — LOW (ref 32–36)
MCV RBC AUTO: 89.8 FL — SIGNIFICANT CHANGE UP (ref 80–100)
MONOCYTES # BLD AUTO: 1.11 K/UL — HIGH (ref 0–0.9)
MONOCYTES NFR BLD AUTO: 7.9 % — SIGNIFICANT CHANGE UP (ref 2–14)
NEUTROPHILS # BLD AUTO: 10.66 K/UL — HIGH (ref 1.8–7.4)
NEUTROPHILS NFR BLD AUTO: 75.9 % — SIGNIFICANT CHANGE UP (ref 43–77)
NRBC # FLD: 0 K/UL — SIGNIFICANT CHANGE UP (ref 0–0)
NT-PROBNP SERPL-SCNC: 296.6 PG/ML — SIGNIFICANT CHANGE UP
PLATELET # BLD AUTO: 240 K/UL — SIGNIFICANT CHANGE UP (ref 150–400)
PMV BLD: 11 FL — SIGNIFICANT CHANGE UP (ref 7–13)
POTASSIUM SERPL-MCNC: 4.6 MMOL/L — SIGNIFICANT CHANGE UP (ref 3.5–5.3)
POTASSIUM SERPL-SCNC: 4.6 MMOL/L — SIGNIFICANT CHANGE UP (ref 3.5–5.3)
PROT SERPL-MCNC: 7 G/DL — SIGNIFICANT CHANGE UP (ref 6–8.3)
RBC # BLD: 4.42 M/UL — SIGNIFICANT CHANGE UP (ref 3.8–5.2)
RBC # FLD: 14.4 % — SIGNIFICANT CHANGE UP (ref 10.3–14.5)
RSV RNA SPEC QL NAA+PROBE: NOT DETECTED — SIGNIFICANT CHANGE UP
RV+EV RNA SPEC QL NAA+PROBE: NOT DETECTED — SIGNIFICANT CHANGE UP
SODIUM SERPL-SCNC: 143 MMOL/L — SIGNIFICANT CHANGE UP (ref 135–145)
TROPONIN T, HIGH SENSITIVITY: 42 NG/L — SIGNIFICANT CHANGE UP (ref ?–14)
TROPONIN T, HIGH SENSITIVITY: 48 NG/L — SIGNIFICANT CHANGE UP (ref ?–14)
WBC # BLD: 14.04 K/UL — HIGH (ref 3.8–10.5)
WBC # FLD AUTO: 14.04 K/UL — HIGH (ref 3.8–10.5)

## 2019-04-30 PROCEDURE — 71275 CT ANGIOGRAPHY CHEST: CPT | Mod: 26

## 2019-04-30 PROCEDURE — 71046 X-RAY EXAM CHEST 2 VIEWS: CPT | Mod: 26

## 2019-04-30 RX ORDER — IPRATROPIUM/ALBUTEROL SULFATE 18-103MCG
3 AEROSOL WITH ADAPTER (GRAM) INHALATION ONCE
Qty: 0 | Refills: 0 | Status: COMPLETED | OUTPATIENT
Start: 2019-04-30 | End: 2019-04-30

## 2019-04-30 RX ORDER — SODIUM CHLORIDE 9 MG/ML
3 INJECTION INTRAMUSCULAR; INTRAVENOUS; SUBCUTANEOUS EVERY 8 HOURS
Qty: 0 | Refills: 0 | Status: DISCONTINUED | OUTPATIENT
Start: 2019-04-30 | End: 2019-05-07

## 2019-04-30 RX ORDER — ATORVASTATIN CALCIUM 80 MG/1
40 TABLET, FILM COATED ORAL AT BEDTIME
Qty: 0 | Refills: 0 | Status: DISCONTINUED | OUTPATIENT
Start: 2019-04-30 | End: 2019-05-07

## 2019-04-30 RX ORDER — ASPIRIN/CALCIUM CARB/MAGNESIUM 324 MG
81 TABLET ORAL DAILY
Qty: 0 | Refills: 0 | Status: DISCONTINUED | OUTPATIENT
Start: 2019-04-30 | End: 2019-05-07

## 2019-04-30 RX ORDER — ALPRAZOLAM 0.25 MG
0.5 TABLET ORAL DAILY
Qty: 0 | Refills: 0 | Status: DISCONTINUED | OUTPATIENT
Start: 2019-04-30 | End: 2019-05-02

## 2019-04-30 RX ORDER — LEVOTHYROXINE SODIUM 125 MCG
125 TABLET ORAL DAILY
Qty: 0 | Refills: 0 | Status: DISCONTINUED | OUTPATIENT
Start: 2019-04-30 | End: 2019-05-07

## 2019-04-30 RX ORDER — AMLODIPINE BESYLATE 2.5 MG/1
2.5 TABLET ORAL AT BEDTIME
Qty: 0 | Refills: 0 | Status: DISCONTINUED | OUTPATIENT
Start: 2019-04-30 | End: 2019-05-07

## 2019-04-30 RX ORDER — ESCITALOPRAM OXALATE 10 MG/1
5 TABLET, FILM COATED ORAL DAILY
Qty: 0 | Refills: 0 | Status: DISCONTINUED | OUTPATIENT
Start: 2019-04-30 | End: 2019-05-01

## 2019-04-30 RX ORDER — FOLIC ACID 0.8 MG
1 TABLET ORAL DAILY
Qty: 0 | Refills: 0 | Status: DISCONTINUED | OUTPATIENT
Start: 2019-04-30 | End: 2019-05-07

## 2019-04-30 RX ORDER — LOSARTAN POTASSIUM 100 MG/1
100 TABLET, FILM COATED ORAL DAILY
Qty: 0 | Refills: 0 | Status: DISCONTINUED | OUTPATIENT
Start: 2019-04-30 | End: 2019-05-07

## 2019-04-30 RX ORDER — TIOTROPIUM BROMIDE 18 UG/1
1 CAPSULE ORAL; RESPIRATORY (INHALATION) DAILY
Qty: 0 | Refills: 0 | Status: DISCONTINUED | OUTPATIENT
Start: 2019-04-30 | End: 2019-05-07

## 2019-04-30 RX ORDER — ALBUTEROL 90 UG/1
2 AEROSOL, METERED ORAL EVERY 6 HOURS
Qty: 0 | Refills: 0 | Status: DISCONTINUED | OUTPATIENT
Start: 2019-04-30 | End: 2019-05-07

## 2019-04-30 RX ORDER — HEPARIN SODIUM 5000 [USP'U]/ML
5000 INJECTION INTRAVENOUS; SUBCUTANEOUS EVERY 8 HOURS
Qty: 0 | Refills: 0 | Status: DISCONTINUED | OUTPATIENT
Start: 2019-04-30 | End: 2019-05-07

## 2019-04-30 RX ORDER — OXYCODONE HYDROCHLORIDE 5 MG/1
10 TABLET ORAL EVERY 12 HOURS
Qty: 0 | Refills: 0 | Status: DISCONTINUED | OUTPATIENT
Start: 2019-04-30 | End: 2019-05-02

## 2019-04-30 RX ORDER — TIOTROPIUM BROMIDE 18 UG/1
1 CAPSULE ORAL; RESPIRATORY (INHALATION) DAILY
Qty: 0 | Refills: 0 | Status: DISCONTINUED | OUTPATIENT
Start: 2019-04-30 | End: 2019-04-30

## 2019-04-30 RX ADMIN — Medication 0.5 MILLIGRAM(S): at 20:51

## 2019-04-30 RX ADMIN — ATORVASTATIN CALCIUM 40 MILLIGRAM(S): 80 TABLET, FILM COATED ORAL at 22:46

## 2019-04-30 RX ADMIN — Medication 3 MILLILITER(S): at 18:06

## 2019-04-30 RX ADMIN — HEPARIN SODIUM 5000 UNIT(S): 5000 INJECTION INTRAVENOUS; SUBCUTANEOUS at 22:46

## 2019-04-30 RX ADMIN — ALBUTEROL 2 PUFF(S): 90 AEROSOL, METERED ORAL at 23:09

## 2019-04-30 RX ADMIN — Medication 0.5 MILLIGRAM(S): at 22:46

## 2019-04-30 RX ADMIN — OXYCODONE HYDROCHLORIDE 10 MILLIGRAM(S): 5 TABLET ORAL at 22:46

## 2019-04-30 RX ADMIN — SODIUM CHLORIDE 3 MILLILITER(S): 9 INJECTION INTRAMUSCULAR; INTRAVENOUS; SUBCUTANEOUS at 22:39

## 2019-04-30 NOTE — H&P ADULT - PROBLEM SELECTOR PLAN 3
consider psych consult in am to help better control panic attacks   + patient was on ativan 1mg for many years and was recently d'cd by PCP for unknown reason.   + ativan .5 x1 ordered for now  + continue lexapro 5mg   + continue Alprazolam .5mg

## 2019-04-30 NOTE — ED ADULT TRIAGE NOTE - CHIEF COMPLAINT QUOTE
BIBEMS from doctors office for CP/SOB, and HTN x 2 days, also c/o back pain, posterior and lft sided head pain, denies N PMH: HTN, asthma, DVT, COPD, Graves, anxiety

## 2019-04-30 NOTE — ED ADULT NURSE REASSESSMENT NOTE - NS ED NURSE REASSESS COMMENT FT1
alert no distress ativan with good effect not anxious  sleeping  easily arousable   SR on scope  report given to Olga moved to ESS[facility] 3

## 2019-04-30 NOTE — ED PROVIDER NOTE - CLINICAL SUMMARY MEDICAL DECISION MAKING FREE TEXT BOX
67yo F with hx of COPD, breast ca/kidney ca in remission, obesity, sleep apnea presenting with 3d of SOB / chest tightness. Concern for ACS and SOB. CT, Labs, EKG.

## 2019-04-30 NOTE — H&P ADULT - NSHPLABSRESULTS_GEN_ALL_CORE
12.1   14.04 )-----------( 240      ( 30 Apr 2019 13:30 )             39.7     04-30    143  |  101  |  8   ----------------------------<  93  4.6   |  29  |  0.63    Ca    10.4      30 Apr 2019 13:30    TPro  7.0  /  Alb  3.8  /  TBili  0.7  /  DBili  x   /  AST  21  /  ALT  20  /  AlkPhos  107  04-30    < from: CT Angio Chest w/ IV Cont (04.30.19 @ 15:11) >    INTERPRETATION:  CLINICAL INFORMATION: Chest pain. Evaluate for pulmonary   embolism.    COMPARISON: Chest radiograph from 4/30/2019. CT chest from 10/10/2015.    PROCEDURE:  CT Angiography of the Chest.  90 ml of Omnipaque 350 was injected intravenously. 10 ml were discarded.  Sagittal and coronal reformats were performed as well as MIPS.    FINDINGS:  LUNGS AND LARGE AIRWAYS: Emphysema associated with paraseptal bulla.   Bibasilar linear atelectasis versus scarring.    PLEURA: No pleural effusion or pneumothorax.    VESSELS: No pulmonary embolism. Main pulmonary artery is enlarged in   size, measuring approximately 3.5 cm, which may be seen in the setting   pulmonary hypertension. Aorta is normal in caliber and demonstrates   minimal atherosclerosis.    HEART: No pericardial effusion. Mild aortic valve calcifications.    MEDIASTINUM AND MIYA: No lymphadenopathy.    CHEST WALL AND LOWER NECK: Within normal limits.    VISUALIZED UPPER ABDOMEN: Left renal simple cysts. Upper anterior   abdominal wall small fat-containing hernia.    BONES: Posterior cervical spinal and upper thoracic fusion and   degenerative change. Bilateral healed rib fractures.    IMPRESSION: No pulmonary embolism or other acute pathology.      < from: Xray Chest 2 Views PA/Lat (04.30.19 @ 13:36) >    PROCEDURE DATE:  Apr 30 2019   INTERPRETATION:  CLINICAL INDICATION: chest pain    EXAM:  Single lateral chest from 4/30/2019 at 1336. Compared prior study from   7/19/2018.     IMPRESSION:  Grossly clear visualized lung fields.    Sharp appearing posterior CP angles.    Generalized osteopenia. Spinal degenerative changes. Intact and   unremarkable partially visualized extensive cervicothoracic anterior and   posterior spinal fusion hardware at superior image margin.    Also correlate with findings on subsequently performed chest CT.      Troponin T, High Sensitivity (04.30.19 @ 15:50)    Troponin T, High Sensitivity: 42: ---------------------***PLEASE NOTE***----------------------    Troponin T, High Sensitivity (04.30.19 @ 13:30)    Troponin T, High Sensitivity: 48: ---------------------***PLEASE NOTE***----------------------    Serum Pro-Brain Natriuretic Peptide (04.30.19 @ 13:30)    Serum Pro-Brain Natriuretic Peptide: 296.6: ACUTE CONGESTIVE HEART FAILURE is UNLIKELY if NT-proBNP  is < 300 pg/mL.    EKG-> NSR HR 78

## 2019-04-30 NOTE — H&P ADULT - PROBLEM SELECTOR PLAN 4
Duoneb q6 ordered   solumedrol ordered   RVP ordered 2/2 elevated WBC and cough + Duoneb q6 ordered   + solumedrol ordered   + RVP ordered 2/2 elevated WBC and cough

## 2019-04-30 NOTE — ED PROVIDER NOTE - ATTENDING CONTRIBUTION TO CARE
Pt was seen and evaluated by me. Pt is 69 y/o female with PMHx of COPD, breast ca/kidney ca and NOEMY who presented to the ED SOB and chest tightness X 3 days. Pt states over the past 3 days having chest tightness with SOB. Pt denies any fever, chills, nausea, vomiting, or abd pain. Pt is paralyzed from waist down with chronic LE swelling which are wrapped, no increased swelling. Seen at Dr. Salcedo's office, told to come for r/o ACS/PE. Lungs CTA b/l. RRR. Abd soft, non-tender. Paralyzed to LE. No increased calf swelling. + distal pulses.

## 2019-04-30 NOTE — ED PROVIDER NOTE - NS ED ROS FT
ROS: denies HA, weakness, dizziness, fevers/chills, nausea/vomiting, diaphoresis, abdominal pain, diarrhea, joint pain, neuro deficits, dysuria/hematuria, rash    +CP/SOB

## 2019-04-30 NOTE — ED PROVIDER NOTE - OBJECTIVE STATEMENT
67yo F with hx of COPD, breast ca/kidney ca in remission, obesity, sleep apnea presenting with 3d of SOB / chest tightness. Seen at Dr. Johns's office, told to come for r/o ACS/PE. 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.

## 2019-04-30 NOTE — H&P ADULT - NSICDXPASTSURGICALHX_GEN_ALL_CORE_FT
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

## 2019-04-30 NOTE — H&P ADULT - NSICDXFAMILYHX_GEN_ALL_CORE_FT
FAMILY HISTORY:  Family history of hypertension, mother and father  Family history of myocardial infarction, mother  at age 67 and father at age 67 of MI's    Sibling  Still living? Yes, Estimated age: Age Unknown  Family history of diabetes mellitus, Age at diagnosis: Age Unknown  Family history of heart disease, Age at diagnosis: Age Unknown    Child  Still living? Yes, Estimated age: Age Unknown  Family history of thyroid cancer, Age at diagnosis: Age Unknown

## 2019-04-30 NOTE — H&P ADULT - NSHPSOCIALHISTORY_GEN_ALL_CORE
Tobacco Usage:  () never smoked   (x ) former smoker  ( ) current smoker; Packs per day:   Alcohol Usage: (x ) none  ( ) occasional ( ) 2-3 times a week ( ) daily; Last drink:   Recreational drugs (x ) None

## 2019-04-30 NOTE — ED PROVIDER NOTE - PHYSICAL EXAMINATION
Gen: Mildly tachypneic  Head: NCAT  HEENT: PERRL, MMM, normal conjunctiva, anicteric, neck supple  Lung: CTAB, no adventitious sounds  CV: RRR, no murmurs, rubs or gallops  Abd: soft, NTND, no rebound or guarding, no CVAT  MSK: No edema, no visible deformities  Neuro: No focal neurologic deficits. CN II-XII grossly intact. 5/5 strength and normal sensation in all extremities.  Skin: Warm and dry, no evidence of rash  Psych: normal mood and affect Gen: Mildly tachypneic  Head: NCAT  HEENT: PERRL, MMM, normal conjunctiva, anicteric, neck supple  Lung: CTAB, no adventitious sounds  CV: RRR, no murmurs, rubs or gallops  Abd: soft, NTND, no rebound or guarding, no CVAT  MSK: No edema, no visible deformities  Neuro: paralyzed LE bilaterally. CN II-XII grossly intact  Skin: Warm and dry, no evidence of rash  Psych: normal mood and affect

## 2019-04-30 NOTE — H&P ADULT - PROBLEM SELECTOR PLAN 1
admit to tele   am labs ordered   trop 48--> 42   EKG shows NSR with no acute changes compared to prior EKG  + will continue to trend CE's admit to tele   am labs ordered   trop 48--> 42   EKG shows NSR with no acute changes compared to prior EKG  + will continue to trend CE's  + f/u MD note admit to tele   am labs ordered   trop 48--> 42   EKG shows NSR with no acute changes compared to prior EKG  + will continue to trend CE's  + echo ordered   + f/u MD note admit to tele   am labs ordered   trop 48--> 42   EKG shows NSR with no acute changes compared to prior EKG  + will continue to trend CE's  + echo ordered   + f/u cardio recom

## 2019-04-30 NOTE — H&P ADULT - HISTORY OF PRESENT ILLNESS
69yo F poor historian with PMhx of COPD, breast ca/kidney ca in remission, obesity, NOEMY, S/P laminectomy 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, and severe anxiety presents today with high Blood pressure, difficulty breathing and midsternal chest tightness since yesterday at physical therapy. Seen at Dr. Johns's office, and was instructed to come for r/o ACS/PE.  Pt states she is can not breath and needs more nebulizer treatments and something to treat her back pain. Pt admits to taking Xtampza ER with no relief of pain. Pt denies HA, fever, chills, abd pain, numbness and tingling. 69yo F poor historian with PMhx of COPD, breast ca/kidney ca in remission, obesity, NOEMY, S/P laminectomy 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, and severe anxiety presents today with high Blood pressure, difficulty breathing and midsternal chest tightness since yesterday at physical therapy.   Seen at Dr. Johns's office, and was instructed to come for r/o ACS/PE.    Pt states she is can not breath and needs more nebulizer treatments and something to treat her back pain.   Pt admits to taking Xtampza ER with no relief of pain. Pt denies HA, fever, chills, abd pain, numbness and tingling.

## 2019-04-30 NOTE — H&P ADULT - PROBLEM SELECTOR PLAN 2
according to past chart notes patient is known to go to 1-2 ERs per month for shortness of breath but it is usually related to panic attacks. CTA for PEs are negative. Patient speaking in full sentences.   + will continue to treat with duonebs   + ativan .5 ordered x1  + may benefit from a phsyc consult in am  + continue lexapro 5mg   + continue Alprazolam .5mg according to past chart notes patient is known to go to 1-2 ERs per month for shortness of breath but it is usually related to panic attacks. CTA for PEs are negative. Patient speaking in full sentences.   + will continue to treat with duonebs   + ativan .5 ordered x1  + may benefit from a psych consult in am  + continue lexapro 5mg   + continue Alprazolam .5mg according to past chart notes patient is known to go to 1-2 ERs per month for shortness of breath but it is usually related to panic attacks. CTA for PEs are negative. Patient speaking in full sentences.   + will continue to treat with duonebs   + ativan .5 ordered x1  + may benefit from a psych consult in am  + continue lexapro 5mg   + continue Alprazolam .5mg  + cardio f/u: Dr Salcedo
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
## 2019-04-30 NOTE — H&P ADULT - NSICDXPASTMEDICALHX_GEN_ALL_CORE_FT
PAST MEDICAL HISTORY:  Anxiety     Asthma     Breast cancer in situ, left     COPD (chronic obstructive pulmonary disease)     DVT (deep venous thrombosis) history of- not presently on A/C    Graves disease     Hyperlipidemia     Hypertension     Hypothyroid     Kidney cancer, primary, with metastasis from kidney to other site, left LEft sided- s/p nephrectomy only- no chemo or RT    Obesity     Sleep apnea

## 2019-04-30 NOTE — H&P ADULT - PROBLEM SELECTOR PLAN 5
continue home medication with hold parameters   DASH diet + continue home medication with hold parameters   + DASH diet

## 2019-04-30 NOTE — H&P ADULT - ASSESSMENT
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 R/o ACS vs panic attack

## 2019-05-01 DIAGNOSIS — F22 DELUSIONAL DISORDERS: ICD-10-CM

## 2019-05-01 LAB
ALBUMIN SERPL ELPH-MCNC: 3.5 G/DL — SIGNIFICANT CHANGE UP (ref 3.3–5)
ALP SERPL-CCNC: 92 U/L — SIGNIFICANT CHANGE UP (ref 40–120)
ALT FLD-CCNC: 18 U/L — SIGNIFICANT CHANGE UP (ref 4–33)
ANION GAP SERPL CALC-SCNC: 14 MMO/L — SIGNIFICANT CHANGE UP (ref 7–14)
APTT BLD: 43.8 SEC — HIGH (ref 27.5–36.3)
AST SERPL-CCNC: 14 U/L — SIGNIFICANT CHANGE UP (ref 4–32)
BILIRUB SERPL-MCNC: 0.9 MG/DL — SIGNIFICANT CHANGE UP (ref 0.2–1.2)
BUN SERPL-MCNC: 8 MG/DL — SIGNIFICANT CHANGE UP (ref 7–23)
CALCIUM SERPL-MCNC: 10 MG/DL — SIGNIFICANT CHANGE UP (ref 8.4–10.5)
CHLORIDE SERPL-SCNC: 101 MMOL/L — SIGNIFICANT CHANGE UP (ref 98–107)
CHLORIDE SERPL-SCNC: 101 MMOL/L — SIGNIFICANT CHANGE UP (ref 98–107)
CHOLEST SERPL-MCNC: 140 MG/DL — SIGNIFICANT CHANGE UP (ref 120–199)
CO2 SERPL-SCNC: 27 MMOL/L — SIGNIFICANT CHANGE UP (ref 22–31)
CO2 SERPL-SCNC: 27 MMOL/L — SIGNIFICANT CHANGE UP (ref 22–31)
CREAT SERPL-MCNC: 0.66 MG/DL — SIGNIFICANT CHANGE UP (ref 0.5–1.3)
GLUCOSE SERPL-MCNC: 104 MG/DL — HIGH (ref 70–99)
HCT VFR BLD CALC: 37.3 % — SIGNIFICANT CHANGE UP (ref 34.5–45)
HCV AB S/CO SERPL IA: 0.14 S/CO — SIGNIFICANT CHANGE UP (ref 0–0.99)
HCV AB SERPL-IMP: SIGNIFICANT CHANGE UP
HDLC SERPL-MCNC: 55 MG/DL — SIGNIFICANT CHANGE UP (ref 45–65)
HGB BLD-MCNC: 11.2 G/DL — LOW (ref 11.5–15.5)
LIPID PNL WITH DIRECT LDL SERPL: 73 MG/DL — SIGNIFICANT CHANGE UP
MAGNESIUM SERPL-MCNC: 2 MG/DL — SIGNIFICANT CHANGE UP (ref 1.6–2.6)
MCHC RBC-ENTMCNC: 27.4 PG — SIGNIFICANT CHANGE UP (ref 27–34)
MCHC RBC-ENTMCNC: 30 % — LOW (ref 32–36)
MCV RBC AUTO: 91.2 FL — SIGNIFICANT CHANGE UP (ref 80–100)
NRBC # FLD: 0 K/UL — SIGNIFICANT CHANGE UP (ref 0–0)
NT-PROBNP SERPL-SCNC: 138.8 PG/ML — SIGNIFICANT CHANGE UP
PHOSPHATE SERPL-MCNC: 3.7 MG/DL — SIGNIFICANT CHANGE UP (ref 2.5–4.5)
PLATELET # BLD AUTO: 202 K/UL — SIGNIFICANT CHANGE UP (ref 150–400)
PMV BLD: 11.4 FL — SIGNIFICANT CHANGE UP (ref 7–13)
POTASSIUM SERPL-MCNC: 3.4 MMOL/L — LOW (ref 3.5–5.3)
POTASSIUM SERPL-MCNC: 3.4 MMOL/L — LOW (ref 3.5–5.3)
POTASSIUM SERPL-SCNC: 3.4 MMOL/L — LOW (ref 3.5–5.3)
POTASSIUM SERPL-SCNC: 3.4 MMOL/L — LOW (ref 3.5–5.3)
PROT SERPL-MCNC: 6.3 G/DL — SIGNIFICANT CHANGE UP (ref 6–8.3)
RBC # BLD: 4.09 M/UL — SIGNIFICANT CHANGE UP (ref 3.8–5.2)
RBC # FLD: 14.6 % — HIGH (ref 10.3–14.5)
SODIUM SERPL-SCNC: 142 MMOL/L — SIGNIFICANT CHANGE UP (ref 135–145)
SODIUM SERPL-SCNC: 142 MMOL/L — SIGNIFICANT CHANGE UP (ref 135–145)
T4 AB SER-ACNC: 7.71 UG/DL — SIGNIFICANT CHANGE UP (ref 5.1–13)
TRIGL SERPL-MCNC: 101 MG/DL — SIGNIFICANT CHANGE UP (ref 10–149)
TROPONIN T, HIGH SENSITIVITY: 52 NG/L — HIGH (ref ?–14)
TROPONIN T, HIGH SENSITIVITY: 53 NG/L — CRITICAL HIGH (ref ?–14)
TSH SERPL-MCNC: 2.53 UIU/ML — SIGNIFICANT CHANGE UP (ref 0.27–4.2)
WBC # BLD: 9.45 K/UL — SIGNIFICANT CHANGE UP (ref 3.8–10.5)
WBC # FLD AUTO: 9.45 K/UL — SIGNIFICANT CHANGE UP (ref 3.8–10.5)

## 2019-05-01 PROCEDURE — 93010 ELECTROCARDIOGRAM REPORT: CPT | Mod: 76

## 2019-05-01 PROCEDURE — 99222 1ST HOSP IP/OBS MODERATE 55: CPT

## 2019-05-01 PROCEDURE — 99223 1ST HOSP IP/OBS HIGH 75: CPT

## 2019-05-01 RX ORDER — RISPERIDONE 4 MG/1
1 TABLET ORAL AT BEDTIME
Qty: 0 | Refills: 0 | Status: DISCONTINUED | OUTPATIENT
Start: 2019-05-01 | End: 2019-05-03

## 2019-05-01 RX ORDER — IPRATROPIUM/ALBUTEROL SULFATE 18-103MCG
3 AEROSOL WITH ADAPTER (GRAM) INHALATION EVERY 6 HOURS
Qty: 0 | Refills: 0 | Status: DISCONTINUED | OUTPATIENT
Start: 2019-05-01 | End: 2019-05-07

## 2019-05-01 RX ORDER — IPRATROPIUM/ALBUTEROL SULFATE 18-103MCG
3 AEROSOL WITH ADAPTER (GRAM) INHALATION ONCE
Qty: 0 | Refills: 0 | Status: COMPLETED | OUTPATIENT
Start: 2019-05-01 | End: 2019-05-01

## 2019-05-01 RX ORDER — ESCITALOPRAM OXALATE 10 MG/1
10 TABLET, FILM COATED ORAL DAILY
Qty: 0 | Refills: 0 | Status: DISCONTINUED | OUTPATIENT
Start: 2019-05-01 | End: 2019-05-03

## 2019-05-01 RX ORDER — POTASSIUM CHLORIDE 20 MEQ
40 PACKET (EA) ORAL ONCE
Qty: 0 | Refills: 0 | Status: DISCONTINUED | OUTPATIENT
Start: 2019-05-01 | End: 2019-05-01

## 2019-05-01 RX ORDER — LANOLIN ALCOHOL/MO/W.PET/CERES
3 CREAM (GRAM) TOPICAL AT BEDTIME
Qty: 0 | Refills: 0 | Status: DISCONTINUED | OUTPATIENT
Start: 2019-05-01 | End: 2019-05-07

## 2019-05-01 RX ORDER — ALPRAZOLAM 0.25 MG
0.5 TABLET ORAL
Qty: 0 | Refills: 0 | Status: DISCONTINUED | OUTPATIENT
Start: 2019-05-01 | End: 2019-05-02

## 2019-05-01 RX ORDER — POTASSIUM CHLORIDE 20 MEQ
40 PACKET (EA) ORAL ONCE
Qty: 0 | Refills: 0 | Status: COMPLETED | OUTPATIENT
Start: 2019-05-01 | End: 2019-05-01

## 2019-05-01 RX ORDER — ACETAMINOPHEN 500 MG
650 TABLET ORAL EVERY 6 HOURS
Qty: 0 | Refills: 0 | Status: DISCONTINUED | OUTPATIENT
Start: 2019-05-01 | End: 2019-05-07

## 2019-05-01 RX ORDER — DIPHENHYDRAMINE HCL 50 MG
25 CAPSULE ORAL ONCE
Qty: 0 | Refills: 0 | Status: COMPLETED | OUTPATIENT
Start: 2019-05-01 | End: 2019-05-01

## 2019-05-01 RX ORDER — HALOPERIDOL DECANOATE 100 MG/ML
1 INJECTION INTRAMUSCULAR EVERY 6 HOURS
Qty: 0 | Refills: 0 | Status: DISCONTINUED | OUTPATIENT
Start: 2019-05-01 | End: 2019-05-07

## 2019-05-01 RX ADMIN — SODIUM CHLORIDE 3 MILLILITER(S): 9 INJECTION INTRAMUSCULAR; INTRAVENOUS; SUBCUTANEOUS at 14:22

## 2019-05-01 RX ADMIN — HEPARIN SODIUM 5000 UNIT(S): 5000 INJECTION INTRAVENOUS; SUBCUTANEOUS at 14:26

## 2019-05-01 RX ADMIN — Medication 20 MILLIGRAM(S): at 06:07

## 2019-05-01 RX ADMIN — OXYCODONE HYDROCHLORIDE 10 MILLIGRAM(S): 5 TABLET ORAL at 11:18

## 2019-05-01 RX ADMIN — Medication 81 MILLIGRAM(S): at 11:24

## 2019-05-01 RX ADMIN — ALBUTEROL 2 PUFF(S): 90 AEROSOL, METERED ORAL at 11:26

## 2019-05-01 RX ADMIN — ALBUTEROL 2 PUFF(S): 90 AEROSOL, METERED ORAL at 18:13

## 2019-05-01 RX ADMIN — Medication 25 MILLIGRAM(S): at 21:07

## 2019-05-01 RX ADMIN — ESCITALOPRAM OXALATE 5 MILLIGRAM(S): 10 TABLET, FILM COATED ORAL at 11:25

## 2019-05-01 RX ADMIN — Medication 0.5 MILLIGRAM(S): at 18:01

## 2019-05-01 RX ADMIN — Medication 0.5 MILLIGRAM(S): at 08:16

## 2019-05-01 RX ADMIN — Medication 1 MILLIGRAM(S): at 17:03

## 2019-05-01 RX ADMIN — Medication 40 MILLIEQUIVALENT(S): at 15:07

## 2019-05-01 RX ADMIN — SODIUM CHLORIDE 3 MILLILITER(S): 9 INJECTION INTRAMUSCULAR; INTRAVENOUS; SUBCUTANEOUS at 06:08

## 2019-05-01 RX ADMIN — AMLODIPINE BESYLATE 2.5 MILLIGRAM(S): 2.5 TABLET ORAL at 21:06

## 2019-05-01 RX ADMIN — ALBUTEROL 2 PUFF(S): 90 AEROSOL, METERED ORAL at 06:07

## 2019-05-01 RX ADMIN — Medication 1 MILLIGRAM(S): at 11:24

## 2019-05-01 RX ADMIN — Medication 650 MILLIGRAM(S): at 14:40

## 2019-05-01 RX ADMIN — Medication 3 MILLILITER(S): at 14:18

## 2019-05-01 RX ADMIN — OXYCODONE HYDROCHLORIDE 10 MILLIGRAM(S): 5 TABLET ORAL at 10:48

## 2019-05-01 RX ADMIN — SODIUM CHLORIDE 3 MILLILITER(S): 9 INJECTION INTRAMUSCULAR; INTRAVENOUS; SUBCUTANEOUS at 21:14

## 2019-05-01 RX ADMIN — LOSARTAN POTASSIUM 100 MILLIGRAM(S): 100 TABLET, FILM COATED ORAL at 06:06

## 2019-05-01 RX ADMIN — Medication 3 MILLIGRAM(S): at 21:07

## 2019-05-01 RX ADMIN — ATORVASTATIN CALCIUM 40 MILLIGRAM(S): 80 TABLET, FILM COATED ORAL at 21:07

## 2019-05-01 RX ADMIN — Medication 1 MILLIGRAM(S): at 23:42

## 2019-05-01 RX ADMIN — HEPARIN SODIUM 5000 UNIT(S): 5000 INJECTION INTRAVENOUS; SUBCUTANEOUS at 21:07

## 2019-05-01 RX ADMIN — OXYCODONE HYDROCHLORIDE 10 MILLIGRAM(S): 5 TABLET ORAL at 22:15

## 2019-05-01 RX ADMIN — OXYCODONE HYDROCHLORIDE 10 MILLIGRAM(S): 5 TABLET ORAL at 21:07

## 2019-05-01 RX ADMIN — Medication 650 MILLIGRAM(S): at 15:07

## 2019-05-01 RX ADMIN — HEPARIN SODIUM 5000 UNIT(S): 5000 INJECTION INTRAVENOUS; SUBCUTANEOUS at 06:08

## 2019-05-01 RX ADMIN — OXYCODONE HYDROCHLORIDE 10 MILLIGRAM(S): 5 TABLET ORAL at 00:06

## 2019-05-01 RX ADMIN — TIOTROPIUM BROMIDE 1 CAPSULE(S): 18 CAPSULE ORAL; RESPIRATORY (INHALATION) at 15:07

## 2019-05-01 RX ADMIN — Medication 125 MICROGRAM(S): at 06:06

## 2019-05-01 RX ADMIN — Medication 3 MILLILITER(S): at 19:37

## 2019-05-01 NOTE — BEHAVIORAL HEALTH ASSESSMENT NOTE - DESCRIPTION
asthma, COPD, breast CA, kidney CA, HTN, HLD, Grave's disease, hypothyroidism, cholecystectemy, laminectemy

## 2019-05-01 NOTE — BEHAVIORAL HEALTH ASSESSMENT NOTE - CASE SUMMARY
68 year old female with schizoaffective d/o, admitted for SOB known COPD. Patient endorses anxiety, amenabe to PRNs as above. Istop shows xanax rx, though patient taking more than prescribed. OK to put patient on prescribed standing xanax with PRNs. Per patient's  patient is on risperdal qHS at home, unknown dose, he administers her meds due to her chronic paranoia, no concern for si/i/p or hi/i/p or safety concerns. OK to use reduced lexapro as above given new HTN. Awaiting collateral from outpatient psych.

## 2019-05-01 NOTE — BEHAVIORAL HEALTH ASSESSMENT NOTE - NSBHCHARTREVIEWIMAGING_PSY_A_CORE FT
< from: CT Angio Chest w/ IV Cont (04.30.19 @ 15:11) >    IMPRESSION:     No pulmonary embolism or other acute pathology.      < end of copied text >

## 2019-05-01 NOTE — CONSULT NOTE ADULT - SUBJECTIVE AND OBJECTIVE BOX
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   Seen at Dr. Johns's office, and was instructed to come for r/o ACS/PE.    Pt states she is can not breath and needs more nebulizer treatments and something to treat her back pain.   Pt admits to taking Xtampza ER with no relief of pain. Pt denies HA, fever, chills, abd pain, numbness and tingling.       Allergies  Grapes (Hives)  No Known Drug Allergies  Peaches (Hives)  shellfish (Hives)    	    MEDICATIONS:  amLODIPine   Tablet 2.5 milliGRAM(s) Oral at bedtime  aspirin  chewable 81 milliGRAM(s) Oral daily  heparin  Injectable 5000 Unit(s) SubCutaneous every 8 hours  losartan 100 milliGRAM(s) Oral daily  torsemide 20 milliGRAM(s) Oral daily  ALBUTerol    90 MICROgram(s) HFA Inhaler 2 Puff(s) Inhalation every 6 hours  tiotropium 18 MICROgram(s) Capsule 1 Capsule(s) Inhalation daily  ALPRAZolam 0.5 milliGRAM(s) Oral daily  escitalopram 5 milliGRAM(s) Oral daily  oxyCODONE  ER Tablet 10 milliGRAM(s) Oral every 12 hours  atorvastatin 40 milliGRAM(s) Oral at bedtime  levothyroxine 125 MICROGram(s) Oral daily  folic acid 1 milliGRAM(s) Oral daily  sodium chloride 0.9% lock flush 3 milliLiter(s) IV Push every 8 hours      PAST MEDICAL & SURGICAL HISTORY:  Asthma  Sleep apnea  Obesity  DVT (deep venous thrombosis): history of- not presently on A/C  Hypothyroid  COPD (chronic obstructive pulmonary disease)  Breast cancer in situ, left  Kidney cancer, primary, with metastasis from kidney to other site, left: LEft sided- s/p nephrectomy only- no chemo or RT  Graves disease  Anxiety  Hyperlipidemia  Hypertension  S/P laminectomy: now bed and wheelchair ridden with severe pain  History of parathyroidectomy  History of carpal tunnel release: right wrist  History of eye surgery: 7 surgeries secondary to grave&#x27;s disease  History of pelvic surgery: Pelvic Sling  S/P breast biopsy: left  S/p nephrectomy: left  S/P cholecystectomy      FAMILY HISTORY:  Family history of thyroid cancer (Child): daughter has thyroid cancer  Family history of heart disease (Sibling): brother has a PPM  Family history of diabetes mellitus (Sibling): siblings  Family history of hypertension: mother and father  Family history of myocardial infarction: mother  at age 67 and father at age 67 of MI&#x27;s      SOCIAL HISTORY:    non smoker. dependent on  for adl. wheelchair bound      REVIEW OF SYSTEMS:  See HPI, otherwise complete 10 point review of systems negative    [ ] All others negative	      PHYSICAL EXAM:  T(C): 36.8 (19 @ 05:52), Max: 36.8 (19 @ 12:39)  HR: 74 (19 @ 05:52) (61 - 82)  BP: 134/59 (19 @ 05:52) (132/79 - 187/94)  RR: 20 (19 @ 05:52) (18 - 22)  SpO2: 97% (19 @ 05:52) (96% - 98%)  Wt(kg): --  I&O's Summary    2019 07:01  -  01 May 2019 07:00  --------------------------------------------------------  IN: 0 mL / OUT: 1 mL / NET: -1 mL        Appearance: No Acute Distress	  HEENT:  Normal oral mucosa, PERRL, EOMI	  Cardiovascular: Normal S1 S2, No JVD, No murmurs/rubs/gallops  Respiratory: Lungs clear to auscultation bilaterally  Gastrointestinal:  Soft, Non-tender, + BS	  Skin: No rashes, No ecchymoses, No cyanosis	  Neurologic: Non-focal  Extremities: No clubbing, cyanosis or edema  Vascular: Peripheral pulses palpable 2+ bilaterally  Psychiatry: A & O x 3, Mood & affect appropriate    Laboratory Data:	 	    CBC Full  -  ( 01 May 2019 06:43 )  WBC Count : 9.45 K/uL  Hemoglobin : 11.2 g/dL  Hematocrit : 37.3 %  Platelet Count - Automated : 202 K/uL  Mean Cell Volume : 91.2 fL  Mean Cell Hemoglobin : 27.4 pg  Mean Cell Hemoglobin Concentration : 30.0 %  Auto Neutrophil # : x  Auto Lymphocyte # : x  Auto Monocyte # : x  Auto Eosinophil # : x  Auto Basophil # : x  Auto Neutrophil % : x  Auto Lymphocyte % : x  Auto Monocyte % : x  Auto Eosinophil % : x  Auto Basophil % : x        143  |  101  |  8   ----------------------------<  93  4.6   |  29  |  0.63    Ca    10.4      2019 13:30    TPro  7.0  /  Alb  3.8  /  TBili  0.7  /  DBili  x   /  AST  21  /  ALT  20  /  AlkPhos  107        proBNP: Serum Pro-Brain Natriuretic Peptide: 296.6 pg/mL ( @ 13:30)      ECG:  	nsr no acute ischemic changes    Assessment:  -chest pain  -sob  -htn  -copd/asthma  -obesity  -noemy    Recs:  -ekg without acute ischemic changes and normal CE. would defer ischemic workup for this time as unlikely to tolerate pharmacologist stress testing and further downstream procedures if indicated at this time  -c/w asa and statin for elevated ASCVD risk  -c/w torsemide to maintain euvolemic. would obtain 2d echo  -c/w anti hypertensives. bp adequately controlled  -consult pulmonary  -results of CTA chest noted. no acute findings  -PT/OT  -DVT ppx            Greater than 60 minutes spent on total encounter; more than 50% of the visit was spent counseling and/or coordinating care by the attending physician.   	  Juan Salcedo MD   Cardiovascular Diseases  [phone]

## 2019-05-01 NOTE — BEHAVIORAL HEALTH ASSESSMENT NOTE - SUMMARY
.. ..Pt is a 68 year old, ,  female, living with her  and developmentally disabled adult son, with psychiatric hx of depression, anxiety, psychosis and prior hospitalizations (most recently at [facility] in 11/2015), and extensive medical hx, including asthma, COPD (quit smoking in 1997), HTN, HLD, Grave's disease, hypothyroidism, breast CA, kidney CA, cholecystectemy, laminectomy, admitted for CP and SPB, consulted for anxiety.     Patient is guarded about her psychiatry hx but does speak of being on risperidone, latuda, ativan in the past.  As per chart review patient also with hx of being on Neurontin and Cymbalta.  patient states she has no hx of psychosis but does have a hx of MDD and anxiety.  She reports having 10 ext treatment sin the 1980s.   Denies feeling depressed at this time.  Denies a hx of SA and no SI/HI at this time.  She reports her lexapro was recently increased to 20mg but contributes this increase to her hypertension. No hx of dov reported.  NO substance use noted.  Denies psychosis but does make suspicious remark during interview " my  sends me to places and tells people im crazy when he needs me out of the house".  Patient feels her anxiety was previously better controlled when she was on a self reported 1mg ativan qid.      Spouse did report to writer patient "believes someone is putting things on her causing her to have an asthma attack."  He states she is mildly paranoid, but it does not interfere with her safety or the safety of others.     PLAN  - lexapro 10mg daily  - xanax 0.5mg BID  - risperidone 1mg qhs  - PRN Ativan 1mg q6hrs  for breakthrough anxiety  - PRN haldol 1mg q6hrs for agitation  - ongoing outpt management with patients outpt MD  - pending further collateral from outpt MD at this time.   - Pt is a 68 year old, ,  female, living with her  and developmentally disabled adult son, with psychiatric hx of depression, anxiety, psychosis and prior hospitalizations (most recently at [facility] in 11/2015), and extensive medical hx, including asthma, COPD (quit smoking in 1997), HTN, HLD, Grave's disease, hypothyroidism, breast CA, kidney CA, cholecystectemy, laminectomy, admitted for CP and SPB, consulted for anxiety.    Patient is guarded about her psychiatry hx but does speak of being on risperidone, latuda, ativan in the past.  As per chart review patient also with hx of being on Neurontin and Cymbalta.  patient states she has no hx of psychosis but does have a hx of MDD and anxiety.  She reports having 10 ext treatment sin the 1980s.   Denies feeling depressed at this time.  Denies a hx of SA and no SI/HI at this time.  She reports her lexapro was recently increased to 20mg but contributes this increase to her hypertension. No hx of dov reported.  NO substance use noted.  Denies psychosis but does make suspicious remark during interview " my  sends me to places and tells people im crazy when he needs me out of the house".  Patient feels her anxiety was previously better controlled when she was on a self reported 1mg ativan qid.    Spouse did report to writer patient "believes someone is putting things on her causing her to have an asthma attack."  He states she is mildly paranoid, but it does not interfere with her safety or the safety of others.     PLAN  - lexapro 10mg daily  - xanax 0.5mg BID  - risperidone 1mg qhs  - PRN Ativan 1mg q6hrs  for breakthrough anxiety  - PRN haldol 1mg q6hrs for agitation  - ongoing outpt management with patients outpt MD  - pending further collateral from outpt MD at this time.

## 2019-05-01 NOTE — BEHAVIORAL HEALTH ASSESSMENT NOTE - NSBHCHARTREVIEWVS_PSY_A_CORE FT
Vital Signs Last 24 Hrs  T(C): 36.7 (01 May 2019 10:37), Max: 36.8 (01 May 2019 05:52)  T(F): 98.1 (01 May 2019 10:37), Max: 98.2 (01 May 2019 05:52)  HR: 64 (01 May 2019 10:37) (61 - 82)  BP: 123/64 (01 May 2019 10:37) (123/64 - 166/88)  BP(mean): --  RR: 18 (01 May 2019 10:37) (18 - 20)  SpO2: 97% (01 May 2019 10:37) (96% - 98%)

## 2019-05-01 NOTE — BEHAVIORAL HEALTH ASSESSMENT NOTE - NSBHCHARTREVIEWLAB_PSY_A_CORE FT
11.2   9.45  )-----------( 202      ( 01 May 2019 06:43 )             37.3   05-01    142  |  101  |  8   ----------------------------<  104<H>  3.4<L>   |  27  |  0.66    Ca    10.0      01 May 2019 06:43  Phos  3.7     05-01  Mg     2.0     05-01    TPro  6.3  /  Alb  3.5  /  TBili  0.9  /  DBili  x   /  AST  14  /  ALT  18  /  AlkPhos  92  05-01

## 2019-05-01 NOTE — BEHAVIORAL HEALTH ASSESSMENT NOTE - AXIS III
asthma, COPD (quit smoking in 1997), HTN, HLD, Grave's disease, hypothyroidism, breast CA, kidney CA, cholecystectemy, laminectomy

## 2019-05-01 NOTE — CONSULT NOTE ADULT - SUBJECTIVE AND OBJECTIVE BOX
Pulmonary Consult Note    SHEKHAR VASQUEZ  MRN-[id number]    Chief Complaint: Patient is a 68y old  Female who presents with a chief complaint of Chest pain r/o ACS (01 May 2019 08:25)      HPI:  68yFemale reports a history of asthma/COPD for about 10 years on incruse and breo at home.  Typical triggers for asthma are strong smells/chemicals.  symptoms are usually chest tightness and shortness of breath no wheeze or cough.  Recent outpt work up by pulmonologist with sniff test demonstrated normal diaphragm function per pt.  Pt was experiencing chest tightness at home, went to see  and was sent to ED for further eval.  CTA negative, only abnormal finding was emphysema.  PT has strong smoking hx quit in 1990's, 1ppd x 25 years. Today is still feeling chest tightness and difficulty breathing, appears very anxious and shakey.  Despite this she has normal O2 sat on room air and no abnormal breath sounds.    ROS:  -believes she is having allergic reaction to cleaning wipes in hospital, no rash/itching etc.  -denies N/V/D/abdominal pain  All other systems reviewed and negative    PAST MEDICAL HISTORY: HEALTH ISSUES - PROBLEM Dx:  Need for prophylactic measure: Need for prophylactic measure  Hyperlipidemia: Hyperlipidemia  Asthma: Asthma  Hypertension: Hypertension  COPD (chronic obstructive pulmonary disease): COPD (chronic obstructive pulmonary disease)  Anxiety: Anxiety  Chest tightness: Chest tightness      SOCIAL HISTORY: former smoker, quit 1995, 1ppd x 25 years    ACTIVE MEDICATION LIST:  MEDICATIONS  (STANDING):  ALBUTerol    90 MICROgram(s) HFA Inhaler 2 Puff(s) Inhalation every 6 hours  ALPRAZolam 0.5 milliGRAM(s) Oral daily  amLODIPine   Tablet 2.5 milliGRAM(s) Oral at bedtime  aspirin  chewable 81 milliGRAM(s) Oral daily  atorvastatin 40 milliGRAM(s) Oral at bedtime  escitalopram 5 milliGRAM(s) Oral daily  folic acid 1 milliGRAM(s) Oral daily  heparin  Injectable 5000 Unit(s) SubCutaneous every 8 hours  levothyroxine 125 MICROGram(s) Oral daily  losartan 100 milliGRAM(s) Oral daily  oxyCODONE  ER Tablet 10 milliGRAM(s) Oral every 12 hours  potassium chloride   Powder 40 milliEquivalent(s) Oral once  sodium chloride 0.9% lock flush 3 milliLiter(s) IV Push every 8 hours  tiotropium 18 MICROgram(s) Capsule 1 Capsule(s) Inhalation daily  torsemide 20 milliGRAM(s) Oral daily    MEDICATIONS  (PRN):      EXAM:  Vital Signs Last 24 Hrs  T(C): 36.7 (01 May 2019 10:37), Max: 36.8 (30 Apr 2019 12:39)  T(F): 98.1 (01 May 2019 10:37), Max: 98.3 (30 Apr 2019 12:39)  HR: 64 (01 May 2019 10:37) (61 - 82)  BP: 123/64 (01 May 2019 10:37) (123/64 - 187/94)  BP(mean): --  RR: 18 (01 May 2019 10:37) (18 - 22)  SpO2: 97% (01 May 2019 10:37) (96% - 98%)  GENERAL: No acute distress  NEURO: Alert and oriented x 3  LUNGS: Clear to auscultation bilaterally, no rales or wheezes  CV: S1/S2, no murmur  ABDOMEN: +BS, nontender  EXTREMITIES: no clubbing, cyanosis, edema    LABS/IMAGING: reviewed                        11.2   9.45  )-----------( 202      ( 01 May 2019 06:43 )             37.3   05-01    142  |  101  |  8   ----------------------------<  104<H>  3.4<L>   |  27  |  0.66    Ca    10.0      01 May 2019 06:43  Phos  3.7     05-01  Mg     2.0     05-01    TPro  6.3  /  Alb  3.5  /  TBili  0.9  /  DBili  x   /  AST  14  /  ALT  18  /  AlkPhos  92  05-01    < from: CT Angio Chest w/ IV Cont (04.30.19 @ 15:11) >  IMPRESSION:     No pulmonary embolism or other acute pathology.    < end of copied text >      PROBLEM LIST:  68yFemale with HEALTH ISSUES - PROBLEM Dx:  dyspnea/chest tightness  asthma?/COPD/emphysema  NOEMY  Hyperlipidemia: Hyperlipidemia  Hypertension: Hypertension  Anxiety    RECS:  -No evidence of active pulmonary disease other than emphysema.  No wheeze on exam, O2 sat normal on room air.  Believe a lot of her symptoms could be related to anxiety.  No evidence of active allergic rxn.  -Patient has NOEMY, will order cpap to use during sleep.  -cont spiriva and albuterol for now.  -will cont to follow  -cont xanax, may need longer acting antianxiety medication  -appreciate cardiology eval--no evidence of active ischemia    Thank you for this consultation, please feel free to call with any questions [phone]  Maryellen Matta MD

## 2019-05-01 NOTE — BEHAVIORAL HEALTH ASSESSMENT NOTE - RISK ASSESSMENT
chronically elevated given psych history and prior trials,   Protective factors are no SI/HI, good family support, pt was noted to be future oriented and with stable affect, denied sleep difficulty, substance use or legal issues.    Family collateral report no acute safety concerns

## 2019-05-01 NOTE — PROGRESS NOTE ADULT - ASSESSMENT
Patient seen, examined, and interviewed by me,  full note to follow _________________________________________________________________________________________  ========>>  M E D I C A L   A T T E N D I N G    F O L L O W  U P  N O T E  <<=========  -----------------------------------------------------------------------------------------------------    - Patient seen and examined by me earlier today.   - In summary,  SHEKHAR VASQUEZ is a 68y year old woman who originally presented with CP   - Patient today with multiple complaints, mainly she is allergic to cleaning material being used and feels having allergy and getting       ( on exam pt is extremely anxious ( exam from before Psych note) )     ==================>> REVIEW OF SYSTEM <<=================    GEN: no fever, no chills, + Chest pain  RESP: +SOB, + cough, no sputum  CVS: + chest pain, + palpitations, no edema  GI: no abdominal pain, no nausea, no constipation, no diarrhea  : no dysuria, no frequency, no hematuria  Neuro: + headache, no dizziness  Derm : no itching, no rash    ==================>> PHYSICAL EXAM <<=================    GEN: A&O X 3 , NAD , comfortable but very anxious   HEENT: NCAT, PERRL, MMM, hearing intact  Neck: supple , no JVD  CVS: S1S2 , regular   PULM: CTA B/L,  but limited as pt not cooperative   ABD.: soft. non tender, non distended,  bowel sounds present  Extrem: intact pulses , no edema   PSYCH : ++ anxious      ==================>> MEDICATIONS <<====================    MEDICATIONS  (STANDING):  ALBUTerol    90 MICROgram(s) HFA Inhaler 2 Puff(s) Inhalation every 6 hours  ALPRAZolam 0.5 milliGRAM(s) Oral daily  ALPRAZolam 0.5 milliGRAM(s) Oral two times a day  amLODIPine   Tablet 2.5 milliGRAM(s) Oral at bedtime  aspirin  chewable 81 milliGRAM(s) Oral daily  atorvastatin 40 milliGRAM(s) Oral at bedtime  escitalopram 10 milliGRAM(s) Oral daily  folic acid 1 milliGRAM(s) Oral daily  heparin  Injectable 5000 Unit(s) SubCutaneous every 8 hours  levothyroxine 125 MICROGram(s) Oral daily  losartan 100 milliGRAM(s) Oral daily  oxyCODONE  ER Tablet 10 milliGRAM(s) Oral every 12 hours  risperiDONE   Tablet 1 milliGRAM(s) Oral at bedtime  sodium chloride 0.9% lock flush 3 milliLiter(s) IV Push every 8 hours  tiotropium 18 MICROgram(s) Capsule 1 Capsule(s) Inhalation daily  torsemide 20 milliGRAM(s) Oral daily    MEDICATIONS  (PRN):  acetaminophen   Tablet .. 650 milliGRAM(s) Oral every 6 hours PRN Temp greater or equal to 38C (100.4F), Mild Pain (1 - 3)  ALBUTerol/ipratropium for Nebulization 3 milliLiter(s) Nebulizer every 6 hours PRN Shortness of Breath and/or Wheezing  LORazepam     Tablet 1 milliGRAM(s) Oral every 6 hours PRN Anxiety    ==================>> VITAL SIGNS <<==================    T(C): 36.7 (05-01-19 @ 10:37), Max: 36.8 (05-01-19 @ 05:52)  HR: 64 (05-01-19 @ 10:37) (61 - 82)  BP: 123/64 (05-01-19 @ 10:37) (123/64 - 166/88)  RR: 18 (05-01-19 @ 10:37) (18 - 20)  SpO2: 97% (05-01-19 @ 10:37) (96% - 98%)    I&O's Summary    30 Apr 2019 07:01  -  01 May 2019 07:00  --------------------------------------------------------  IN: 0 mL / OUT: 1 mL / NET: -1 mL     ==================>> LAB AND IMAGING <<==================                        11.2   9.45  )-----------( 202      ( 01 May 2019 06:43 )             37.3     142  |  101  |  8   ----------------------------<  104<H>  3.4<L>   |  27  |  0.66    Ca    10.0      01 May 2019 06:43  Phos  3.7     05-01  Mg     2.0     05-01    TPro  6.3  /  Alb  3.5  /  TBili  0.9  /  DBili  x   /  AST  14  /  ALT  18  /  AlkPhos  92  05-01    PTT - ( 01 May 2019 06:43 )  PTT:43.8 SEC            CARDIAC MARKERS ( 30 Apr 2019 15:50 )    ~~ High Sensitivity Troponin  ~~  52  <<--, 53  <<--, 42  <<--, 48  <<--     TSH:      2.53   (05-01-19)           Lipid profile:  (05-01-19)     Total: 140     LDL  : 73     HDL  :55     TG   :101   ___________________________________________________________________________________  ===============>>  A S S E S S M E N T   A N D   P L A N <<===============  ------------------------------------------------------------------------------------------    · Assessment       69yo F with hx of COPD, breast ca/kidney ca in remission, obesity, NOEMY, S/P laminectomy 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, and severe anxiety presents today with high pressure, difficulty breathing and chest tightness     Problem/Plan - 1:  ·  Problem:  chest pain and SOB, atypical, likely anxiety related   appreciated cardio  ruled out for ACS  tele monitoring  EKG shows NSR with no acute changes compared to prior EKG   echo    appreciated pulm imput    Problem/Plan - 2:  ·  Problem: psychiatric disorder, with active anxiety, possible delusions  appreciated Psych input and medical mgmt  continue meds per Psych  monitor    Problem/Plan - 3:  ·  Problem:  COPD / asthma history , stable   + Duoneb as above   no need for steroids  pulm apprecaited    Problem/Plan - 4:  ·  Problem: Hypertension.    + continue home medication with hold parameters   + DASH diet.     Problem/Plan - 5:  ·  Problem: Hyperlipidemia.    + continue atorvastatin 40   + low fat diet.     Problem/Plan - 6:  ·  Problem: Need for prophylactic measure.    + heparin SQ.     --------------------------------------------  Case discussed with pt, RN, PA, cardio, pulm   Education given on findings and plan of care  ___________________________  HRenetta Welch D.O.  Pager: [phone]

## 2019-05-02 ENCOUNTER — APPOINTMENT (OUTPATIENT)
Dept: PSYCHIATRY | Facility: CLINIC | Age: 69
End: 2019-05-02

## 2019-05-02 PROCEDURE — 99233 SBSQ HOSP IP/OBS HIGH 50: CPT

## 2019-05-02 RX ORDER — IPRATROPIUM/ALBUTEROL SULFATE 18-103MCG
3 AEROSOL WITH ADAPTER (GRAM) INHALATION ONCE
Qty: 0 | Refills: 0 | Status: COMPLETED | OUTPATIENT
Start: 2019-05-02 | End: 2019-05-02

## 2019-05-02 RX ORDER — ALPRAZOLAM 0.25 MG
0.5 TABLET ORAL
Qty: 0 | Refills: 0 | Status: DISCONTINUED | OUTPATIENT
Start: 2019-05-02 | End: 2019-05-03

## 2019-05-02 RX ORDER — OXYCODONE HYDROCHLORIDE 5 MG/1
10 TABLET ORAL EVERY 12 HOURS
Qty: 0 | Refills: 0 | Status: DISCONTINUED | OUTPATIENT
Start: 2019-05-02 | End: 2019-05-07

## 2019-05-02 RX ORDER — DIPHENHYDRAMINE HCL 50 MG
25 CAPSULE ORAL ONCE
Qty: 0 | Refills: 0 | Status: COMPLETED | OUTPATIENT
Start: 2019-05-02 | End: 2019-05-02

## 2019-05-02 RX ADMIN — OXYCODONE HYDROCHLORIDE 10 MILLIGRAM(S): 5 TABLET ORAL at 21:01

## 2019-05-02 RX ADMIN — Medication 3 MILLILITER(S): at 23:10

## 2019-05-02 RX ADMIN — OXYCODONE HYDROCHLORIDE 10 MILLIGRAM(S): 5 TABLET ORAL at 22:00

## 2019-05-02 RX ADMIN — Medication 125 MICROGRAM(S): at 05:53

## 2019-05-02 RX ADMIN — OXYCODONE HYDROCHLORIDE 10 MILLIGRAM(S): 5 TABLET ORAL at 12:35

## 2019-05-02 RX ADMIN — ALBUTEROL 2 PUFF(S): 90 AEROSOL, METERED ORAL at 10:29

## 2019-05-02 RX ADMIN — Medication 25 MILLIGRAM(S): at 21:04

## 2019-05-02 RX ADMIN — Medication 3 MILLILITER(S): at 08:43

## 2019-05-02 RX ADMIN — Medication 3 MILLIGRAM(S): at 21:02

## 2019-05-02 RX ADMIN — AMLODIPINE BESYLATE 2.5 MILLIGRAM(S): 2.5 TABLET ORAL at 21:03

## 2019-05-02 RX ADMIN — Medication 81 MILLIGRAM(S): at 12:41

## 2019-05-02 RX ADMIN — SODIUM CHLORIDE 3 MILLILITER(S): 9 INJECTION INTRAMUSCULAR; INTRAVENOUS; SUBCUTANEOUS at 21:05

## 2019-05-02 RX ADMIN — Medication 20 MILLIGRAM(S): at 05:53

## 2019-05-02 RX ADMIN — ATORVASTATIN CALCIUM 40 MILLIGRAM(S): 80 TABLET, FILM COATED ORAL at 21:03

## 2019-05-02 RX ADMIN — Medication 1 MILLIGRAM(S): at 19:48

## 2019-05-02 RX ADMIN — Medication 1 MILLIGRAM(S): at 12:41

## 2019-05-02 RX ADMIN — HEPARIN SODIUM 5000 UNIT(S): 5000 INJECTION INTRAVENOUS; SUBCUTANEOUS at 21:02

## 2019-05-02 RX ADMIN — HEPARIN SODIUM 5000 UNIT(S): 5000 INJECTION INTRAVENOUS; SUBCUTANEOUS at 05:53

## 2019-05-02 RX ADMIN — SODIUM CHLORIDE 3 MILLILITER(S): 9 INJECTION INTRAMUSCULAR; INTRAVENOUS; SUBCUTANEOUS at 14:52

## 2019-05-02 RX ADMIN — HEPARIN SODIUM 5000 UNIT(S): 5000 INJECTION INTRAVENOUS; SUBCUTANEOUS at 14:53

## 2019-05-02 RX ADMIN — OXYCODONE HYDROCHLORIDE 10 MILLIGRAM(S): 5 TABLET ORAL at 10:29

## 2019-05-02 RX ADMIN — LOSARTAN POTASSIUM 100 MILLIGRAM(S): 100 TABLET, FILM COATED ORAL at 05:53

## 2019-05-02 RX ADMIN — Medication 1 MILLIGRAM(S): at 10:29

## 2019-05-02 RX ADMIN — Medication 3 MILLILITER(S): at 20:41

## 2019-05-02 RX ADMIN — ALBUTEROL 2 PUFF(S): 90 AEROSOL, METERED ORAL at 21:04

## 2019-05-02 RX ADMIN — Medication 0.5 MILLIGRAM(S): at 05:59

## 2019-05-02 RX ADMIN — SODIUM CHLORIDE 3 MILLILITER(S): 9 INJECTION INTRAMUSCULAR; INTRAVENOUS; SUBCUTANEOUS at 08:43

## 2019-05-02 RX ADMIN — ESCITALOPRAM OXALATE 10 MILLIGRAM(S): 10 TABLET, FILM COATED ORAL at 12:41

## 2019-05-02 RX ADMIN — TIOTROPIUM BROMIDE 1 CAPSULE(S): 18 CAPSULE ORAL; RESPIRATORY (INHALATION) at 10:30

## 2019-05-02 RX ADMIN — Medication 0.5 MILLIGRAM(S): at 18:34

## 2019-05-02 NOTE — PROGRESS NOTE ADULT - SUBJECTIVE AND OBJECTIVE BOX
Cardiovascular Disease Progress Note    Overnight events: reports ongoing anxiety attacks/chest tightness/sob. no edema/palps/dizziness   Otherwise review of systems negative    Objective Findings:  T(C): 36.4 (05-02-19 @ 05:45), Max: 36.7 (05-01-19 @ 10:37)  HR: 85 (05-02-19 @ 08:00) (51 - 86)  BP: 142/70 (05-02-19 @ 05:45) (116/63 - 159/98)  RR: 16 (05-02-19 @ 05:45) (16 - 18)  SpO2: 99% (05-02-19 @ 08:00) (96% - 100%)  Wt(kg): --  Daily     Daily       Physical Exam:  Gen: NAD  HEENT: EOMI  CV: RRR, normal S1 + S2, no m/r/g  Lungs: CTAB  Abd: soft, non-tender  Ext: No edema        Laboratory Data:                        11.2   9.45  )-----------( 202      ( 01 May 2019 06:43 )             37.3     05-01    142  |  101  |  8   ----------------------------<  104<H>  3.4<L>   |  27  |  0.66    Ca    10.0      01 May 2019 06:43  Phos  3.7     05-01  Mg     2.0     05-01    TPro  6.3  /  Alb  3.5  /  TBili  0.9  /  DBili  x   /  AST  14  /  ALT  18  /  AlkPhos  92  05-01    PTT - ( 01 May 2019 06:43 )  PTT:43.8 SEC  CARDIAC MARKERS ( 30 Apr 2019 15:50 )  x     / x     / 38 u/L / 2.38 ng/mL / x      CARDIAC MARKERS ( 30 Apr 2019 13:30 )  x     / x     / 41 u/L / 2.71 ng/mL / x              Inpatient Medications:  MEDICATIONS  (STANDING):  ALBUTerol    90 MICROgram(s) HFA Inhaler 2 Puff(s) Inhalation every 6 hours  ALPRAZolam 0.5 milliGRAM(s) Oral two times a day  amLODIPine   Tablet 2.5 milliGRAM(s) Oral at bedtime  aspirin  chewable 81 milliGRAM(s) Oral daily  atorvastatin 40 milliGRAM(s) Oral at bedtime  escitalopram 10 milliGRAM(s) Oral daily  folic acid 1 milliGRAM(s) Oral daily  heparin  Injectable 5000 Unit(s) SubCutaneous every 8 hours  levothyroxine 125 MICROGram(s) Oral daily  losartan 100 milliGRAM(s) Oral daily  oxyCODONE  ER Tablet 10 milliGRAM(s) Oral every 12 hours  risperiDONE   Tablet 1 milliGRAM(s) Oral at bedtime  sodium chloride 0.9% lock flush 3 milliLiter(s) IV Push every 8 hours  tiotropium 18 MICROgram(s) Capsule 1 Capsule(s) Inhalation daily  torsemide 20 milliGRAM(s) Oral daily      Assessment:  -chest pain  -sob  -htn  -copd/asthma  -obesity  -melba    Recs:  -ekg without acute ischemic changes and normal CE. would defer ischemic workup at this time as unlikely to tolerate pharmacologist stress testing and any further downstream/invasive procedures if warranted at this time  -c/w asa and statin for elevated ASCVD risk  -c/w torsemide to maintain euvolemic. would obtain 2d echo  -c/w anti hypertensives. bp adequately controlled  -pulmonary consult appreciated. c/w BD for asthma  -psych eval underway. f/u recs  -results of CTA chest noted. no acute findings  -PT/OT  -DVT ppx        Over 25 minutes spent on total encounter; more than 50% of the visit was spent counseling and/or coordinating care by the attending physician.      Juan Salcedo MD   Cardiovascular Disease  [phone]

## 2019-05-02 NOTE — PROGRESS NOTE ADULT - ASSESSMENT
_________________________________________________________________________________________  ========>>  M E D I C A L   A T T E N D I N G    F O L L O W  U P  N O T E  <<=========  -----------------------------------------------------------------------------------------------------    - Patient seen and examined by me earlier today.   - In summary,  SHEKHAR VASQUEZ is a 68y year old woman who originally presented with CP   - Patient today overall better, still feels having allergic reactions and asthma attach despite reassurance from myself and Pulm... but appears calmer today     ==================>> REVIEW OF SYSTEM <<=================    GEN: no fever, no chills, no pain  RESP: SOB improved, no significant cough reported , no sputum  CVS:  no  chest pain, palpitations, no edema  GI: no abdominal pain, no nausea  : no dysuria, no frequency  Neuro: no headache, no dizziness  Derm : no itching, no rash    ==================>> PHYSICAL EXAM <<=================    GEN: A&O X 3 , NAD , comfortable, less anxious   HEENT: NCAT, PERRL, MMM, hearing intact  Neck: supple , no JVD  CVS: S1S2 , regular   PULM: CTA B/L,  no wheezing or rales noted   ABD.: soft. non tender, non distended,  bowel sounds present  Extrem: intact pulses , no edema   PSYCH : less anxious       ==================>> MEDICATIONS <<====================    ALBUTerol    90 MICROgram(s) HFA Inhaler 2 Puff(s) Inhalation every 6 hours  ALPRAZolam 0.5 milliGRAM(s) Oral two times a day  amLODIPine   Tablet 2.5 milliGRAM(s) Oral at bedtime  aspirin  chewable 81 milliGRAM(s) Oral daily  atorvastatin 40 milliGRAM(s) Oral at bedtime  escitalopram 10 milliGRAM(s) Oral daily  folic acid 1 milliGRAM(s) Oral daily  heparin  Injectable 5000 Unit(s) SubCutaneous every 8 hours  levothyroxine 125 MICROGram(s) Oral daily  losartan 100 milliGRAM(s) Oral daily  oxyCODONE  ER Tablet 10 milliGRAM(s) Oral every 12 hours  risperiDONE   Tablet 1 milliGRAM(s) Oral at bedtime  sodium chloride 0.9% lock flush 3 milliLiter(s) IV Push every 8 hours  tiotropium 18 MICROgram(s) Capsule 1 Capsule(s) Inhalation daily  torsemide 20 milliGRAM(s) Oral daily    MEDICATIONS  (PRN):  acetaminophen   Tablet .. 650 milliGRAM(s) Oral every 6 hours PRN Temp greater or equal to 38C (100.4F), Mild Pain (1 - 3)  ALBUTerol/ipratropium for Nebulization 3 milliLiter(s) Nebulizer every 6 hours PRN Shortness of Breath and/or Wheezing  haloperidol     Tablet 1 milliGRAM(s) Oral every 6 hours PRN Agitation  haloperidol    Injectable 1 milliGRAM(s) IV Push every 6 hours PRN Agitation  LORazepam     Tablet 1 milliGRAM(s) Oral every 6 hours PRN Anxiety  melatonin 3 milliGRAM(s) Oral at bedtime PRN Insomnia    ==================>> VITAL SIGNS <<==================    Vital Signs Last 24 Hrs  T(C): 36.9 (05-02-19 @ 14:51)  T(F): 98.5 (05-02-19 @ 14:51), Max: 98.5 (05-02-19 @ 14:51)  HR: 87 (05-02-19 @ 16:27) (51 - 87)  BP: 116/76 (05-02-19 @ 14:51)  RR: 19 (05-02-19 @ 14:51) (16 - 19)  SpO2: 96% (05-02-19 @ 16:27) (96% - 100%)       ==================>> LAB AND IMAGING <<==================                        11.2   9.45  )-----------( 202      ( 01 May 2019 06:43 )             37.3        142  |  101  |  8   ----------------------------<  104<H>  3.4<L>   |  27  |  0.66    Ca    10.0      01 May 2019 06:43  Phos  3.7     05-01  Mg     2.0     05-01    TPro  6.3  /  Alb  3.5  /  TBili  0.9  /  DBili  x   /  AST  14  /  ALT  18  /  AlkPhos  92  05-01    WBC count:   9.45 <<== ,  14.04 <<==   Hemoglobin:   11.2 <<==,  12.1 <<==  platelets:  202 <==, 240 <==    Creatinine:  0.66  <<==, 0.63  <<==  Sodium:   142  <==, 143  <==       AST:          14 <== , 21 <==      ALT:        18  <== , 20  <==      AP:        92  <=, 107  <=     Bili:        0.9  <=, 0.7  <=   ___________________________________________________________________________________  ===============>>  A S S E S S M E N T   A N D   P L A N <<===============  ------------------------------------------------------------------------------------------    · Assessment		   69yo F with hx of COPD, breast ca/kidney ca in remission, obesity, NOEMY, S/P laminectomy 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, and severe anxiety presents today with high pressure, difficulty breathing and chest tightness     Problem/Plan - 1:  ·  Problem:  chest pain and SOB, atypical, likely anxiety related, improved   appreciated cardio   echo    appreciated Psych, pulm and cardio input    Problem/Plan - 2:  ·  Problem: psychiatric disorder, with  anxiety, possible delusions  appreciated Psych input and medical mgmt  continue meds per Psych  monitor    Problem/Plan - 3:  ·  Problem:  COPD / asthma history , stable   + Duoneb as above   no need for steroids  pulm appreciated    Problem/Plan - 4:  ·  Problem: Hypertension.    + continue home medication with hold parameters   + DASH diet.     Problem/Plan - 5:  ·  Problem: Hyperlipidemia.    + continue atorvastatin 40   + low fat diet.     Problem/Plan - 6:  ·  Problem: Need for prophylactic measure.    + heparin SQ.   Pt and  asking about rehab >> Pt ordered   --------------------------------------------  Case discussed with pt, , cardio,  Education given on findings and plan of care  ___________________________  HRenetta Welch D.O.  Pager: [phone]

## 2019-05-02 NOTE — PROGRESS NOTE ADULT - SUBJECTIVE AND OBJECTIVE BOX
Pulmonary Follow up Note    SHEKHAR VASQUEZ  MRN-[id number]    Chief Complaint: Patient is a 68y old  Female who presents with a chief complaint of Chest pain r/o ACS (01 May 2019 08:25)      HPI:  When I walked in the room patient was asleep breathing comfortably, no resp, distress.  I woke her up she stated she was still short of breath and her chest was tight.  Used cpap for only a short time last night.    ROS:  -believes she is having allergic reaction to cleaning wipes in hospital, no rash/itching etc.      SOCIAL HISTORY: former smoker, quit 1995, 1ppd x 25 years    MEDICATIONS  (STANDING):  ALBUTerol    90 MICROgram(s) HFA Inhaler 2 Puff(s) Inhalation every 6 hours  ALPRAZolam 0.5 milliGRAM(s) Oral two times a day  amLODIPine   Tablet 2.5 milliGRAM(s) Oral at bedtime  aspirin  chewable 81 milliGRAM(s) Oral daily  atorvastatin 40 milliGRAM(s) Oral at bedtime  escitalopram 10 milliGRAM(s) Oral daily  folic acid 1 milliGRAM(s) Oral daily  heparin  Injectable 5000 Unit(s) SubCutaneous every 8 hours  levothyroxine 125 MICROGram(s) Oral daily  losartan 100 milliGRAM(s) Oral daily  oxyCODONE  ER Tablet 10 milliGRAM(s) Oral every 12 hours  risperiDONE   Tablet 1 milliGRAM(s) Oral at bedtime  sodium chloride 0.9% lock flush 3 milliLiter(s) IV Push every 8 hours  tiotropium 18 MICROgram(s) Capsule 1 Capsule(s) Inhalation daily  torsemide 20 milliGRAM(s) Oral daily    MEDICATIONS  (PRN):  acetaminophen   Tablet .. 650 milliGRAM(s) Oral every 6 hours PRN Temp greater or equal to 38C (100.4F), Mild Pain (1 - 3)  ALBUTerol/ipratropium for Nebulization 3 milliLiter(s) Nebulizer every 6 hours PRN Shortness of Breath and/or Wheezing  haloperidol     Tablet 1 milliGRAM(s) Oral every 6 hours PRN Agitation  haloperidol    Injectable 1 milliGRAM(s) IV Push every 6 hours PRN Agitation  LORazepam     Tablet 1 milliGRAM(s) Oral every 6 hours PRN Anxiety  melatonin 3 milliGRAM(s) Oral at bedtime PRN Insomnia        EXAM:  Vital Signs Last 24 Hrs  T(C): 36.7 (02 May 2019 10:28), Max: 36.7 (02 May 2019 10:28)  T(F): 98.1 (02 May 2019 10:28), Max: 98.1 (02 May 2019 10:28)  HR: 86 (02 May 2019 10:28) (51 - 86)  BP: 96/62 (02 May 2019 10:28) (96/62 - 159/98)  BP(mean): --  RR: 18 (02 May 2019 10:28) (16 - 18)  SpO2: 98% (02 May 2019 10:28) (96% - 100%)  GENERAL: No acute distress  NEURO: Alert and oriented x 3  LUNGS: Clear to auscultation bilaterally, no rales or wheezes  CV: S1/S2    LABS/IMAGING: reviewed                        11.2   9.45  )-----------( 202      ( 01 May 2019 06:43 )             37.3   05-01    142  |  101  |  8   ----------------------------<  104<H>  3.4<L>   |  27  |  0.66    Ca    10.0      01 May 2019 06:43  Phos  3.7     05-01  Mg     2.0     05-01    TPro  6.3  /  Alb  3.5  /  TBili  0.9  /  DBili  x   /  AST  14  /  ALT  18  /  AlkPhos  92  05-01      < from: CT Angio Chest w/ IV Cont (04.30.19 @ 15:11) >  IMPRESSION:     No pulmonary embolism or other acute pathology.    < end of copied text >      PROBLEM LIST:  68yFemale with HEALTH ISSUES - PROBLEM Dx:  dyspnea/chest tightness  asthma?/COPD/emphysema  NOEMY  Hyperlipidemia: Hyperlipidemia  Hypertension: Hypertension  Anxiety    RECS:  -No evidence of active pulmonary disease other than emphysema seen on CT.  No wheeze on exam, O2 sat normal on room air, would not make any changes at this time.  -cont cpap qhs  -cont spiriva and albuterol for now.  -appreciate psych eval  -appreciate cardiology eval--no evidence of active ischemia     please feel free to call with any questions [phone]  Maryellen Matta MD

## 2019-05-03 ENCOUNTER — TRANSCRIPTION ENCOUNTER (OUTPATIENT)
Age: 69
End: 2019-05-03

## 2019-05-03 LAB
ANION GAP SERPL CALC-SCNC: 10 MMO/L — SIGNIFICANT CHANGE UP (ref 7–14)
BUN SERPL-MCNC: 14 MG/DL — SIGNIFICANT CHANGE UP (ref 7–23)
CALCIUM SERPL-MCNC: 10 MG/DL — SIGNIFICANT CHANGE UP (ref 8.4–10.5)
CHLORIDE SERPL-SCNC: 101 MMOL/L — SIGNIFICANT CHANGE UP (ref 98–107)
CO2 SERPL-SCNC: 30 MMOL/L — SIGNIFICANT CHANGE UP (ref 22–31)
CREAT SERPL-MCNC: 0.79 MG/DL — SIGNIFICANT CHANGE UP (ref 0.5–1.3)
GLUCOSE SERPL-MCNC: 92 MG/DL — SIGNIFICANT CHANGE UP (ref 70–99)
HCT VFR BLD CALC: 37.7 % — SIGNIFICANT CHANGE UP (ref 34.5–45)
HGB BLD-MCNC: 11.3 G/DL — LOW (ref 11.5–15.5)
MAGNESIUM SERPL-MCNC: 1.9 MG/DL — SIGNIFICANT CHANGE UP (ref 1.6–2.6)
MCHC RBC-ENTMCNC: 27.8 PG — SIGNIFICANT CHANGE UP (ref 27–34)
MCHC RBC-ENTMCNC: 30 % — LOW (ref 32–36)
MCV RBC AUTO: 92.9 FL — SIGNIFICANT CHANGE UP (ref 80–100)
NRBC # FLD: 0 K/UL — SIGNIFICANT CHANGE UP (ref 0–0)
PHOSPHATE SERPL-MCNC: 4.1 MG/DL — SIGNIFICANT CHANGE UP (ref 2.5–4.5)
PLATELET # BLD AUTO: 194 K/UL — SIGNIFICANT CHANGE UP (ref 150–400)
PMV BLD: 11.9 FL — SIGNIFICANT CHANGE UP (ref 7–13)
POTASSIUM SERPL-MCNC: 4.1 MMOL/L — SIGNIFICANT CHANGE UP (ref 3.5–5.3)
POTASSIUM SERPL-SCNC: 4.1 MMOL/L — SIGNIFICANT CHANGE UP (ref 3.5–5.3)
RBC # BLD: 4.06 M/UL — SIGNIFICANT CHANGE UP (ref 3.8–5.2)
RBC # FLD: 14.9 % — HIGH (ref 10.3–14.5)
SODIUM SERPL-SCNC: 141 MMOL/L — SIGNIFICANT CHANGE UP (ref 135–145)
WBC # BLD: 9.11 K/UL — SIGNIFICANT CHANGE UP (ref 3.8–10.5)
WBC # FLD AUTO: 9.11 K/UL — SIGNIFICANT CHANGE UP (ref 3.8–10.5)

## 2019-05-03 PROCEDURE — 99233 SBSQ HOSP IP/OBS HIGH 50: CPT

## 2019-05-03 RX ORDER — ALPRAZOLAM 0.25 MG
0.5 TABLET ORAL ONCE
Qty: 0 | Refills: 0 | Status: DISCONTINUED | OUTPATIENT
Start: 2019-05-03 | End: 2019-05-03

## 2019-05-03 RX ORDER — ESCITALOPRAM OXALATE 10 MG/1
15 TABLET, FILM COATED ORAL DAILY
Qty: 0 | Refills: 0 | Status: DISCONTINUED | OUTPATIENT
Start: 2019-05-03 | End: 2019-05-07

## 2019-05-03 RX ORDER — ESCITALOPRAM OXALATE 10 MG/1
5 TABLET, FILM COATED ORAL DAILY
Qty: 0 | Refills: 0 | Status: DISCONTINUED | OUTPATIENT
Start: 2019-05-03 | End: 2019-05-03

## 2019-05-03 RX ADMIN — HEPARIN SODIUM 5000 UNIT(S): 5000 INJECTION INTRAVENOUS; SUBCUTANEOUS at 06:12

## 2019-05-03 RX ADMIN — ATORVASTATIN CALCIUM 40 MILLIGRAM(S): 80 TABLET, FILM COATED ORAL at 21:17

## 2019-05-03 RX ADMIN — ALBUTEROL 2 PUFF(S): 90 AEROSOL, METERED ORAL at 17:41

## 2019-05-03 RX ADMIN — Medication 20 MILLIGRAM(S): at 06:13

## 2019-05-03 RX ADMIN — Medication 81 MILLIGRAM(S): at 13:38

## 2019-05-03 RX ADMIN — ALBUTEROL 2 PUFF(S): 90 AEROSOL, METERED ORAL at 10:06

## 2019-05-03 RX ADMIN — OXYCODONE HYDROCHLORIDE 10 MILLIGRAM(S): 5 TABLET ORAL at 21:16

## 2019-05-03 RX ADMIN — HEPARIN SODIUM 5000 UNIT(S): 5000 INJECTION INTRAVENOUS; SUBCUTANEOUS at 21:27

## 2019-05-03 RX ADMIN — Medication 3 MILLILITER(S): at 11:20

## 2019-05-03 RX ADMIN — Medication 1 MILLIGRAM(S): at 12:38

## 2019-05-03 RX ADMIN — AMLODIPINE BESYLATE 2.5 MILLIGRAM(S): 2.5 TABLET ORAL at 21:16

## 2019-05-03 RX ADMIN — OXYCODONE HYDROCHLORIDE 10 MILLIGRAM(S): 5 TABLET ORAL at 10:05

## 2019-05-03 RX ADMIN — SODIUM CHLORIDE 3 MILLILITER(S): 9 INJECTION INTRAMUSCULAR; INTRAVENOUS; SUBCUTANEOUS at 06:16

## 2019-05-03 RX ADMIN — Medication 3 MILLIGRAM(S): at 21:17

## 2019-05-03 RX ADMIN — OXYCODONE HYDROCHLORIDE 10 MILLIGRAM(S): 5 TABLET ORAL at 22:15

## 2019-05-03 RX ADMIN — SODIUM CHLORIDE 3 MILLILITER(S): 9 INJECTION INTRAMUSCULAR; INTRAVENOUS; SUBCUTANEOUS at 13:36

## 2019-05-03 RX ADMIN — Medication 0.5 MILLIGRAM(S): at 20:19

## 2019-05-03 RX ADMIN — HEPARIN SODIUM 5000 UNIT(S): 5000 INJECTION INTRAVENOUS; SUBCUTANEOUS at 13:38

## 2019-05-03 RX ADMIN — Medication 3 MILLILITER(S): at 17:59

## 2019-05-03 RX ADMIN — Medication 1 MILLIGRAM(S): at 13:37

## 2019-05-03 RX ADMIN — TIOTROPIUM BROMIDE 1 CAPSULE(S): 18 CAPSULE ORAL; RESPIRATORY (INHALATION) at 10:05

## 2019-05-03 RX ADMIN — Medication 125 MICROGRAM(S): at 06:13

## 2019-05-03 RX ADMIN — ALBUTEROL 2 PUFF(S): 90 AEROSOL, METERED ORAL at 21:16

## 2019-05-03 RX ADMIN — Medication 0.5 MILLIGRAM(S): at 17:39

## 2019-05-03 RX ADMIN — ESCITALOPRAM OXALATE 10 MILLIGRAM(S): 10 TABLET, FILM COATED ORAL at 13:37

## 2019-05-03 RX ADMIN — LOSARTAN POTASSIUM 100 MILLIGRAM(S): 100 TABLET, FILM COATED ORAL at 06:13

## 2019-05-03 RX ADMIN — Medication 0.5 MILLIGRAM(S): at 06:16

## 2019-05-03 RX ADMIN — SODIUM CHLORIDE 3 MILLILITER(S): 9 INJECTION INTRAMUSCULAR; INTRAVENOUS; SUBCUTANEOUS at 21:12

## 2019-05-03 NOTE — PROGRESS NOTE BEHAVIORAL HEALTH - NSBHCHARTREVIEWVS_PSY_A_CORE FT
Vital Signs Last 24 Hrs  T(C): 36.8 (03 May 2019 10:36), Max: 36.9 (02 May 2019 14:51)  T(F): 98.2 (03 May 2019 10:36), Max: 98.5 (02 May 2019 14:51)  HR: 78 (03 May 2019 11:20) (70 - 100)  BP: 126/54 (03 May 2019 10:36) (116/76 - 133/50)  BP(mean): --  RR: 18 (03 May 2019 10:36) (16 - 20)  SpO2: 95% (03 May 2019 11:20) (95% - 100%)

## 2019-05-03 NOTE — PHYSICAL THERAPY INITIAL EVALUATION ADULT - ADDITIONAL COMMENTS
Patient reports she lives with her  and son in a co-op, with elevator access. Patient reports she required assistance with ADLs from her family, and ambulated ~10 feet with a rolling walker and assistance prior to admission. Otherwise, pt uses a wheelchair.    Patient was left sitting in chair, all lines/tubes intact and call harris within reach, ADDI bang

## 2019-05-03 NOTE — DISCHARGE NOTE PROVIDER - HOSPITAL COURSE
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         HOSPITAL COURSE    Chest tightness - R/O ACS vs anxiety/panic attack     - tele     - trop 48--> 42 --> 53 ---> 52    - EKG shows NSR with no acute changes compared to prior EKG    - cardio Dr Salcedo: would defer ischemic workup for this time as unlikely to tolerate pharmacologist stress testing and further downstream procedures if indicated at this time    - psyche cs: lexapro, xanax, ativan and zyprexa PRN         Chest tightness    - according to past chart notes patient is known to go to 1-2 ERs per month for shortness of breath but it is usually related to panic attacks. - - CTA for PEs are negative. Patient speaking in full sentences.     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         HOSPITAL COURSE    Chest tightness - R/O ACS vs anxiety/panic attack. CTA for PEs are negative. Pt has been evaluated by cardiologist, cardial eval negative. EKG shows NSR with no acute changes compared to prior EKG    - cardio Dr Salcedo: would defer ischemic workup for this time as unlikely to tolerate pharmacologist stress testing and further downstream procedures if indicated at this time    - psych consulted, meds adjusted.     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         HOSPITAL COURSE    Chest tightness - R/O ACS vs anxiety/panic attack. CTA for PEs are negative. Pt has been evaluated by cardiologist, cardial eval negative. EKG shows NSR with no acute changes compared to prior EKG    - cardio Dr Salcedo: would defer ischemic workup for this time as unlikely to tolerate pharmacologist stress testing and further downstream procedures if indicated at this time    - psych consulted, meds adjusted.     COPD, evaluated by pulm Dr Matta treated w/ nebs, s/p solumedrol, RVP negative , c/w CPAP HS    HTN- norvasc, losartan     HLD- statin    Hypothyroid- synthroid        Pt is optimized for rehab

## 2019-05-03 NOTE — PROGRESS NOTE ADULT - ASSESSMENT
_________________________________________________________________________________________  ========>>  M E D I C A L   A T T E N D I N G    F O L L O W  U P  N O T E  <<=========  -----------------------------------------------------------------------------------------------------    - Patient seen and examined by me earlier today.   - In summary,  SHEKHAR VASQUEZ is a 68y year old woman who originally presented with CP   - Patient today overall better, still feels having asthma attach     ==================>> REVIEW OF SYSTEM <<=================    GEN: no fever, no chills, no pain  RESP: SOB improved, no significant cough reported , no sputum  CVS:  no  chest pain, palpitations, no edema  GI: no abdominal pain, no nausea  : no dysuria, no frequency  Neuro: no headache, no dizziness  Derm : no itching, no rash    ==================>> PHYSICAL EXAM <<=================    GEN: A&O X 3 , NAD , comfortable, + anxious   HEENT: NCAT, PERRL, MMM, hearing intact  Neck: supple , no JVD  CVS: S1S2 , regular   PULM: CTA B/L,  no wheezing or rales noted   ABD.: soft. non tender, non distended,  bowel sounds present  Extrem: intact pulses , no edema   PSYCH : less anxious       ==================>> MEDICATIONS <<====================    ALBUTerol    90 MICROgram(s) HFA Inhaler 2 Puff(s) Inhalation every 6 hours  amLODIPine   Tablet 2.5 milliGRAM(s) Oral at bedtime  aspirin  chewable 81 milliGRAM(s) Oral daily  atorvastatin 40 milliGRAM(s) Oral at bedtime  escitalopram 15 milliGRAM(s) Oral daily  folic acid 1 milliGRAM(s) Oral daily  heparin  Injectable 5000 Unit(s) SubCutaneous every 8 hours  levothyroxine 125 MICROGram(s) Oral daily  losartan 100 milliGRAM(s) Oral daily  oxyCODONE  ER Tablet 10 milliGRAM(s) Oral every 12 hours  sodium chloride 0.9% lock flush 3 milliLiter(s) IV Push every 8 hours  tiotropium 18 MICROgram(s) Capsule 1 Capsule(s) Inhalation daily  torsemide 20 milliGRAM(s) Oral daily    MEDICATIONS  (PRN):  acetaminophen   Tablet .. 650 milliGRAM(s) Oral every 6 hours PRN Temp greater or equal to 38C (100.4F), Mild Pain (1 - 3)  ALBUTerol/ipratropium for Nebulization 3 milliLiter(s) Nebulizer every 6 hours PRN Shortness of Breath and/or Wheezing  haloperidol     Tablet 1 milliGRAM(s) Oral every 6 hours PRN Agitation  haloperidol    Injectable 1 milliGRAM(s) IV Push every 6 hours PRN Agitation  LORazepam     Tablet 1 milliGRAM(s) Oral every 6 hours PRN Anxiety  melatonin 3 milliGRAM(s) Oral at bedtime PRN Insomnia    ==================>> VITAL SIGNS <<==================    Vital Signs Last 24 Hrs  T(C): 36.7 (05-03-19 @ 14:00)  T(F): 98.1 (05-03-19 @ 14:00), Max: 98.5 (05-02-19 @ 14:51)  HR: 97 (05-03-19 @ 14:00) (70 - 100)  BP: 123/62 (05-03-19 @ 14:00)  RR: 18 (05-03-19 @ 14:00) (16 - 20)  SpO2: 97% (05-03-19 @ 14:00) (95% - 100%)       ==================>> LAB AND IMAGING <<==================                        11.3   9.11  )-----------( 194      ( 03 May 2019 06:32 )             37.7     141  |  101  |  14  ----------------------------<  92  4.1   |  30  |  0.79    Ca    10.0      03 May 2019 06:23  Phos  4.1     05-03  Mg     1.9     05-03    WBC count:   9.11 <<== ,  9.45 <<== ,  14.04 <<==   Hemoglobin:   11.3 <<==,  11.2 <<==,  12.1 <<==  platelets:  194 <==, 202 <==, 240 <==    Creatinine:  0.79  <<==, 0.66  <<==, 0.63  <<==  Sodium:   141  <==, 142  <==, 143  <==       AST:          14 <== , 21 <==      ALT:        18  <== , 20  <==      AP:        92  <=, 107  <=     Bili:        0.9  <=, 0.7  <=     ___________________________________________________________________________________  ===============>>  A S S E S S M E N T   A N D   P L A N <<===============  ------------------------------------------------------------------------------------------    · Assessment		   69yo F with hx of COPD, breast ca/kidney ca in remission, obesity, NOEMY, S/P laminectomy 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, and severe anxiety presents today with high pressure, difficulty breathing and chest tightness     Problem/Plan - 1:  ·  Problem:  chest pain and SOB, atypical, likely anxiety related, improved   appreciated cardio   echo    appreciated Psych, pulm and cardio input    Problem/Plan - 2:  ·  Problem: psychiatric disorder, with  anxiety, possible delusions  appreciated Psych input and medical mgmt  continue meds per Psych  monitor    Problem/Plan - 3:  ·  Problem:  COPD / asthma history , stable   + Duoneb as above   no need for steroids  pulm appreciated    Problem/Plan - 4:  ·  Problem: Hypertension.    + continue home medication with hold parameters   + DASH diet.     Problem/Plan - 5:  ·  Problem: Hyperlipidemia.    + continue atorvastatin 40   + low fat diet.     Problem/Plan - 6:  ·  Problem: Need for prophylactic measure.    + heparin SQ.   Pt and  asking about rehab >> PT >> rehab planing    --------------------------------------------  Case discussed with pt  Education given on findings and plan of care  ___________________________  DIANE Welch D.O.  Pager: [phone]

## 2019-05-03 NOTE — PROGRESS NOTE ADULT - SUBJECTIVE AND OBJECTIVE BOX
Cardiovascular Disease Progress Note    Overnight events: No acute events overnight.  still with intermittent cp/sob. no palps/dizziness/edema  Otherwise review of systems negative    Objective Findings:  T(C): 36.7 (19 @ 06:07), Max: 36.9 (19 @ 14:51)  HR: 75 (19 @ 08:10) (70 - 100)  BP: 133/50 (19 @ 06:07) (96/62 - 133/50)  RR: 16 (19 @ 06:07) (16 - 20)  SpO2: 96% (19 @ 08:10) (96% - 100%)  Wt(kg): --  Daily     Daily Weight in k.5 (03 May 2019 01:51)      Physical Exam:  Gen: NAD  HEENT: EOMI  CV: RRR, normal S1 + S2, no m/r/g  Lungs: CTAB  Abd: soft, non-tender  Ext: No edema        Laboratory Data:                        11.3   9.11  )-----------( 194      ( 03 May 2019 06:32 )             37.7     05-    141  |  101  |  14  ----------------------------<  92  4.1   |  30  |  0.79    Ca    10.0      03 May 2019 06:23  Phos  4.1     05-03  Mg     1.9     05-03                Inpatient Medications:  MEDICATIONS  (STANDING):  ALBUTerol    90 MICROgram(s) HFA Inhaler 2 Puff(s) Inhalation every 6 hours  ALPRAZolam 0.5 milliGRAM(s) Oral two times a day  amLODIPine   Tablet 2.5 milliGRAM(s) Oral at bedtime  aspirin  chewable 81 milliGRAM(s) Oral daily  atorvastatin 40 milliGRAM(s) Oral at bedtime  escitalopram 10 milliGRAM(s) Oral daily  folic acid 1 milliGRAM(s) Oral daily  heparin  Injectable 5000 Unit(s) SubCutaneous every 8 hours  levothyroxine 125 MICROGram(s) Oral daily  losartan 100 milliGRAM(s) Oral daily  oxyCODONE  ER Tablet 10 milliGRAM(s) Oral every 12 hours  risperiDONE   Tablet 1 milliGRAM(s) Oral at bedtime  sodium chloride 0.9% lock flush 3 milliLiter(s) IV Push every 8 hours  tiotropium 18 MICROgram(s) Capsule 1 Capsule(s) Inhalation daily  torsemide 20 milliGRAM(s) Oral daily      Assessment:  -chest pain  -sob  -htn  -copd/asthma  -obesity  -melba    Recs:  -ekg without acute ischemic changes and normal CE. would defer ischemic workup at this time as unlikely to tolerate pharmacologic stress testing and any further downstream/invasive procedures if warranted at this time  -c/w asa and statin for elevated ASCVD risk  -c/w torsemide to maintain euvolemic. would obtain 2d echo  -c/w anti hypertensives. bp adequately controlled  -pulmonary consult appreciated. c/w BD for asthma  -psych eval underway. f/u recs  -results of CTA chest noted. no acute findings  -PT/OT  -DVT ppx        Over 25 minutes spent on total encounter; more than 50% of the visit was spent counseling and/or coordinating care by the attending physician.      Juan Salcedo MD   Cardiovascular Disease  [phone]

## 2019-05-03 NOTE — DISCHARGE NOTE PROVIDER - CARE PROVIDER_API CALL
Gorge Salcedo (MD)  Cardiovascular Disease; Internal Medicine  [address] [address] [place] [address]  Phone: [phone]  Fax: [phone]  Follow Up Time:

## 2019-05-03 NOTE — DISCHARGE NOTE PROVIDER - NSDCCPCAREPLAN_GEN_ALL_CORE_FT
PRINCIPAL DISCHARGE DIAGNOSIS  Diagnosis: Chest tightness  Assessment and Plan of Treatment: ACS r/o. Likely panic attacks. Continue with psych meds as directed.      SECONDARY DISCHARGE DIAGNOSES  Diagnosis: Asthma  Assessment and Plan of Treatment: Continue your inhalers and annual pulmonary function tests with your outpatient provider. Monitor for asthma exacerbation, such as, shortness of breath, hyperventilation, or any other difficulties breathing and report to the emergency room in the event that your rescue inhaler has not resolved your symptoms.    Diagnosis: Hypertension  Assessment and Plan of Treatment: Continue blood pressure medication regimen as directed. Monitor for any visual changes, headaches or dizziness.  Monitor blood pressure regularly.  Follow up with your PCP for further management for high blood pressure.    Diagnosis: Hyperlipidemia  Assessment and Plan of Treatment: Continue cholesterol control medications. Continue DASH diet. Follow up with your PCP within 1 week of discharge for further management and monitoring of lipid and cholesterol panels. PRINCIPAL DISCHARGE DIAGNOSIS  Diagnosis: Chest tightness  Assessment and Plan of Treatment: ACS r/o. Cardiac work up unremarkable   Likely panic attacks.   f/u with Dr. Wood [phone]  f/u [facility] [phone] 9a-7p  Pt should see psychiatrist at rehab  Continue with psych meds as directed.   Lexapro 15mg daily, Ativan 0.5mg 2 x day and Neurontin 200mg 3 x day         SECONDARY DISCHARGE DIAGNOSES  Diagnosis: Hypertension  Assessment and Plan of Treatment: Continue blood pressure medication regimen as directed. Monitor for any visual changes, headaches or dizziness.  Monitor blood pressure regularly.  Follow up with your PCP for further management for high blood pressure.    Diagnosis: Asthma  Assessment and Plan of Treatment: Continue your inhalers and annual pulmonary function tests with your outpatient provider. Monitor for asthma exacerbation, such as, shortness of breath, hyperventilation, or any other difficulties breathing and report to the emergency room in the event that your rescue inhaler has not resolved your symptoms.    Diagnosis: Hyperlipidemia  Assessment and Plan of Treatment: Continue cholesterol control medications. Continue DASH diet. Follow up with your PCP within 1 week of discharge for further management and monitoring of lipid and cholesterol panels.

## 2019-05-03 NOTE — PHYSICAL THERAPY INITIAL EVALUATION ADULT - GAIT DEVIATIONS NOTED, PT EVAL
decreased ita/decreased step length/increased time in double stance/decreased velocity of limb motion/decreased stride length/decreased weight-shifting ability

## 2019-05-03 NOTE — PHYSICAL THERAPY INITIAL EVALUATION ADULT - PATIENT PROFILE REVIEW, REHAB EVAL
PT orders received: out of bed with chair. Consult with RN Reinaldo JOHNSTON, pt may participate in PT evaluation./yes

## 2019-05-03 NOTE — PROGRESS NOTE ADULT - SUBJECTIVE AND OBJECTIVE BOX
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  she then began to complain of chest tightness, wants nebulizer at bedside at all times.    ROS:  -believes she is having allergic reaction to cleaning wipes in hospital, no rash/itching etc.      SOCIAL HISTORY: former smoker, quit 1995, 1ppd x 25 years    MEDICATIONS  (STANDING):  ALBUTerol    90 MICROgram(s) HFA Inhaler 2 Puff(s) Inhalation every 6 hours  ALPRAZolam 0.5 milliGRAM(s) Oral two times a day  amLODIPine   Tablet 2.5 milliGRAM(s) Oral at bedtime  aspirin  chewable 81 milliGRAM(s) Oral daily  atorvastatin 40 milliGRAM(s) Oral at bedtime  escitalopram 10 milliGRAM(s) Oral daily  folic acid 1 milliGRAM(s) Oral daily  heparin  Injectable 5000 Unit(s) SubCutaneous every 8 hours  levothyroxine 125 MICROGram(s) Oral daily  losartan 100 milliGRAM(s) Oral daily  oxyCODONE  ER Tablet 10 milliGRAM(s) Oral every 12 hours  risperiDONE   Tablet 1 milliGRAM(s) Oral at bedtime  sodium chloride 0.9% lock flush 3 milliLiter(s) IV Push every 8 hours  tiotropium 18 MICROgram(s) Capsule 1 Capsule(s) Inhalation daily  torsemide 20 milliGRAM(s) Oral daily    MEDICATIONS  (PRN):  acetaminophen   Tablet .. 650 milliGRAM(s) Oral every 6 hours PRN Temp greater or equal to 38C (100.4F), Mild Pain (1 - 3)  ALBUTerol/ipratropium for Nebulization 3 milliLiter(s) Nebulizer every 6 hours PRN Shortness of Breath and/or Wheezing  haloperidol     Tablet 1 milliGRAM(s) Oral every 6 hours PRN Agitation  haloperidol    Injectable 1 milliGRAM(s) IV Push every 6 hours PRN Agitation  LORazepam     Tablet 1 milliGRAM(s) Oral every 6 hours PRN Anxiety  melatonin 3 milliGRAM(s) Oral at bedtime PRN Insomnia        EXAM:  Vital Signs Last 24 Hrs  T(C): 36.8 (03 May 2019 10:36), Max: 36.9 (02 May 2019 14:51)  T(F): 98.2 (03 May 2019 10:36), Max: 98.5 (02 May 2019 14:51)  HR: 81 (03 May 2019 10:36) (70 - 100)  BP: 126/54 (03 May 2019 10:36) (116/76 - 133/50)  BP(mean): --  RR: 18 (03 May 2019 10:36) (16 - 20)  SpO2: 99% (03 May 2019 10:36) (96% - 100%)  GENERAL: No acute distress  NEURO: Alert and oriented x 3  LUNGS: Clear to auscultation bilaterally, no rales or wheezes  CV: S1/S2    LABS/IMAGING: reviewed                                   11.3   9.11  )-----------( 194      ( 03 May 2019 06:32 )             37.7   05-03    141  |  101  |  14  ----------------------------<  92  4.1   |  30  |  0.79    Ca    10.0      03 May 2019 06:23  Phos  4.1     05-03  Mg     1.9     05-03          < from: CT Angio Chest w/ IV Cont (04.30.19 @ 15:11) >  IMPRESSION:     No pulmonary embolism or other acute pathology.    < end of copied text >      PROBLEM LIST:  68yFemale with HEALTH ISSUES - PROBLEM Dx:  dyspnea/chest tightness  asthma?/COPD/emphysema  NOEMY  Hyperlipidemia: Hyperlipidemia  Hypertension: Hypertension  Anxiety    RECS:  -No evidence of active pulmonary disease other than emphysema seen on CT.  No wheeze on exam, O2 sat normal on room air, would not make any changes at this time.  -cont cpap qhs  -cont spiriva and albuterol for now.  -appreciate psych eval  -appreciate cardiology eval--no evidence of active ischemia     please feel free to call with any questions [phone]  Maryellen Matta MD

## 2019-05-03 NOTE — PHYSICAL THERAPY INITIAL EVALUATION ADULT - PERTINENT HX OF CURRENT PROBLEM, REHAB EVAL
Patient is a 68 year old female admitted to [facility] on 4/30 with high Blood pressure, difficulty breathing and midsternal chest tightness. 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

## 2019-05-03 NOTE — PHYSICAL THERAPY INITIAL EVALUATION ADULT - MANUAL MUSCLE TESTING RESULTS, REHAB EVAL
bilateral UEs 4+/5, bilateral hip flexion 3-/5, bilateral knee extension/ankles 3+/5/grossly assessed due to

## 2019-05-03 NOTE — PROGRESS NOTE BEHAVIORAL HEALTH - NSBHCHARTREVIEWLAB_PSY_A_CORE FT
05-03    141  |  101  |  14  ----------------------------<  92  4.1   |  30  |  0.79    Ca    10.0      03 May 2019 06:23  Phos  4.1     05-03  Mg     1.9     05-03                          11.3   9.11  )-----------( 194      ( 03 May 2019 06:32 )             37.7

## 2019-05-03 NOTE — DISCHARGE NOTE PROVIDER - NSDCCAREPROVSEEN_GEN_ALL_CORE_FT
Kamila Welch, Maryellen  [facility], ADS  [facility], TELE Juan Jack Erendira Salcedo  Hoorbod Delshadfar  Hoorbod Delshadfar  Hoorbod Delshadfar  User ADM  Bella Zavlanova Anthony Symes Joel Pierre  [facility]  Tammi Artem Mccray  Ordering Physician  Andi Vicente

## 2019-05-04 LAB
ANION GAP SERPL CALC-SCNC: 11 MMO/L — SIGNIFICANT CHANGE UP (ref 7–14)
BUN SERPL-MCNC: 15 MG/DL — SIGNIFICANT CHANGE UP (ref 7–23)
CALCIUM SERPL-MCNC: 10.2 MG/DL — SIGNIFICANT CHANGE UP (ref 8.4–10.5)
CHLORIDE SERPL-SCNC: 100 MMOL/L — SIGNIFICANT CHANGE UP (ref 98–107)
CO2 SERPL-SCNC: 30 MMOL/L — SIGNIFICANT CHANGE UP (ref 22–31)
CREAT SERPL-MCNC: 0.73 MG/DL — SIGNIFICANT CHANGE UP (ref 0.5–1.3)
GLUCOSE SERPL-MCNC: 92 MG/DL — SIGNIFICANT CHANGE UP (ref 70–99)
HCT VFR BLD CALC: 36.3 % — SIGNIFICANT CHANGE UP (ref 34.5–45)
HGB BLD-MCNC: 10.9 G/DL — LOW (ref 11.5–15.5)
MAGNESIUM SERPL-MCNC: 1.8 MG/DL — SIGNIFICANT CHANGE UP (ref 1.6–2.6)
MCHC RBC-ENTMCNC: 27.3 PG — SIGNIFICANT CHANGE UP (ref 27–34)
MCHC RBC-ENTMCNC: 30 % — LOW (ref 32–36)
MCV RBC AUTO: 91 FL — SIGNIFICANT CHANGE UP (ref 80–100)
NRBC # FLD: 0 K/UL — SIGNIFICANT CHANGE UP (ref 0–0)
PHOSPHATE SERPL-MCNC: 3.8 MG/DL — SIGNIFICANT CHANGE UP (ref 2.5–4.5)
PLATELET # BLD AUTO: 183 K/UL — SIGNIFICANT CHANGE UP (ref 150–400)
PMV BLD: 11.9 FL — SIGNIFICANT CHANGE UP (ref 7–13)
POTASSIUM SERPL-MCNC: 3.9 MMOL/L — SIGNIFICANT CHANGE UP (ref 3.5–5.3)
POTASSIUM SERPL-SCNC: 3.9 MMOL/L — SIGNIFICANT CHANGE UP (ref 3.5–5.3)
RBC # BLD: 3.99 M/UL — SIGNIFICANT CHANGE UP (ref 3.8–5.2)
RBC # FLD: 14.8 % — HIGH (ref 10.3–14.5)
SODIUM SERPL-SCNC: 141 MMOL/L — SIGNIFICANT CHANGE UP (ref 135–145)
WBC # BLD: 8.45 K/UL — SIGNIFICANT CHANGE UP (ref 3.8–10.5)
WBC # FLD AUTO: 8.45 K/UL — SIGNIFICANT CHANGE UP (ref 3.8–10.5)

## 2019-05-04 PROCEDURE — 99232 SBSQ HOSP IP/OBS MODERATE 35: CPT

## 2019-05-04 RX ORDER — OXYCODONE HYDROCHLORIDE 5 MG/1
5 TABLET ORAL ONCE
Qty: 0 | Refills: 0 | Status: DISCONTINUED | OUTPATIENT
Start: 2019-05-04 | End: 2019-05-04

## 2019-05-04 RX ORDER — IPRATROPIUM/ALBUTEROL SULFATE 18-103MCG
3 AEROSOL WITH ADAPTER (GRAM) INHALATION ONCE
Qty: 0 | Refills: 0 | Status: DISCONTINUED | OUTPATIENT
Start: 2019-05-04 | End: 2019-05-07

## 2019-05-04 RX ORDER — DIPHENHYDRAMINE HCL 50 MG
25 CAPSULE ORAL ONCE
Qty: 0 | Refills: 0 | Status: COMPLETED | OUTPATIENT
Start: 2019-05-04 | End: 2019-05-04

## 2019-05-04 RX ADMIN — Medication 25 MILLIGRAM(S): at 13:10

## 2019-05-04 RX ADMIN — HEPARIN SODIUM 5000 UNIT(S): 5000 INJECTION INTRAVENOUS; SUBCUTANEOUS at 05:47

## 2019-05-04 RX ADMIN — Medication 1 MILLIGRAM(S): at 13:08

## 2019-05-04 RX ADMIN — Medication 125 MICROGRAM(S): at 05:50

## 2019-05-04 RX ADMIN — Medication 3 MILLILITER(S): at 04:59

## 2019-05-04 RX ADMIN — ALBUTEROL 2 PUFF(S): 90 AEROSOL, METERED ORAL at 21:20

## 2019-05-04 RX ADMIN — Medication 0.5 MILLIGRAM(S): at 05:47

## 2019-05-04 RX ADMIN — OXYCODONE HYDROCHLORIDE 5 MILLIGRAM(S): 5 TABLET ORAL at 03:11

## 2019-05-04 RX ADMIN — HEPARIN SODIUM 5000 UNIT(S): 5000 INJECTION INTRAVENOUS; SUBCUTANEOUS at 13:10

## 2019-05-04 RX ADMIN — SODIUM CHLORIDE 3 MILLILITER(S): 9 INJECTION INTRAMUSCULAR; INTRAVENOUS; SUBCUTANEOUS at 22:46

## 2019-05-04 RX ADMIN — OXYCODONE HYDROCHLORIDE 5 MILLIGRAM(S): 5 TABLET ORAL at 23:10

## 2019-05-04 RX ADMIN — HEPARIN SODIUM 5000 UNIT(S): 5000 INJECTION INTRAVENOUS; SUBCUTANEOUS at 21:23

## 2019-05-04 RX ADMIN — LOSARTAN POTASSIUM 100 MILLIGRAM(S): 100 TABLET, FILM COATED ORAL at 05:51

## 2019-05-04 RX ADMIN — Medication 20 MILLIGRAM(S): at 05:51

## 2019-05-04 RX ADMIN — ALBUTEROL 2 PUFF(S): 90 AEROSOL, METERED ORAL at 05:46

## 2019-05-04 RX ADMIN — Medication 81 MILLIGRAM(S): at 13:10

## 2019-05-04 RX ADMIN — Medication 3 MILLILITER(S): at 22:44

## 2019-05-04 RX ADMIN — ATORVASTATIN CALCIUM 40 MILLIGRAM(S): 80 TABLET, FILM COATED ORAL at 21:22

## 2019-05-04 RX ADMIN — AMLODIPINE BESYLATE 2.5 MILLIGRAM(S): 2.5 TABLET ORAL at 21:23

## 2019-05-04 RX ADMIN — ESCITALOPRAM OXALATE 15 MILLIGRAM(S): 10 TABLET, FILM COATED ORAL at 13:09

## 2019-05-04 RX ADMIN — ALBUTEROL 2 PUFF(S): 90 AEROSOL, METERED ORAL at 18:14

## 2019-05-04 RX ADMIN — ALBUTEROL 2 PUFF(S): 90 AEROSOL, METERED ORAL at 09:28

## 2019-05-04 RX ADMIN — SODIUM CHLORIDE 3 MILLILITER(S): 9 INJECTION INTRAMUSCULAR; INTRAVENOUS; SUBCUTANEOUS at 13:06

## 2019-05-04 RX ADMIN — Medication 3 MILLILITER(S): at 13:45

## 2019-05-04 RX ADMIN — Medication 0.5 MILLIGRAM(S): at 18:15

## 2019-05-04 RX ADMIN — Medication 1 MILLIGRAM(S): at 21:20

## 2019-05-04 RX ADMIN — Medication 1 MILLIGRAM(S): at 14:21

## 2019-05-04 RX ADMIN — OXYCODONE HYDROCHLORIDE 10 MILLIGRAM(S): 5 TABLET ORAL at 09:40

## 2019-05-04 RX ADMIN — SODIUM CHLORIDE 3 MILLILITER(S): 9 INJECTION INTRAMUSCULAR; INTRAVENOUS; SUBCUTANEOUS at 05:51

## 2019-05-04 RX ADMIN — OXYCODONE HYDROCHLORIDE 10 MILLIGRAM(S): 5 TABLET ORAL at 22:10

## 2019-05-04 RX ADMIN — OXYCODONE HYDROCHLORIDE 10 MILLIGRAM(S): 5 TABLET ORAL at 21:20

## 2019-05-04 RX ADMIN — Medication 3 MILLIGRAM(S): at 22:49

## 2019-05-04 RX ADMIN — Medication 1 MILLIGRAM(S): at 09:26

## 2019-05-04 RX ADMIN — TIOTROPIUM BROMIDE 1 CAPSULE(S): 18 CAPSULE ORAL; RESPIRATORY (INHALATION) at 09:28

## 2019-05-04 NOTE — PROGRESS NOTE ADULT - SUBJECTIVE AND OBJECTIVE BOX
Cardiovascular Disease Progress Note    Overnight events: No acute events overnight.  still gets sob and cp at times. vital signs remain unremarkable. currently sitting in chair without any complaints  Otherwise review of systems negative    Objective Findings:  T(C): 36.6 (05-04-19 @ 10:31), Max: 36.7 (05-03-19 @ 14:00)  HR: 80 (05-04-19 @ 10:31) (60 - 97)  BP: 149/54 (05-04-19 @ 05:39) (121/61 - 149/54)  RR: 18 (05-04-19 @ 10:31) (16 - 18)  SpO2: 96% (05-04-19 @ 10:31) (95% - 100%)  Wt(kg): --  Daily     Daily       Physical Exam:  Gen: NAD  HEENT: EOMI  CV: RRR, normal S1 + S2, no m/r/g  Lungs: CTAB  Abd: soft, non-tender  Ext: No edema      Laboratory Data:                        10.9   8.45  )-----------( 183      ( 04 May 2019 06:10 )             36.3     05-04    141  |  100  |  15  ----------------------------<  92  3.9   |  30  |  0.73    Ca    10.2      04 May 2019 06:10  Phos  3.8     05-04  Mg     1.8     05-04                Inpatient Medications:  MEDICATIONS  (STANDING):  ALBUTerol    90 MICROgram(s) HFA Inhaler 2 Puff(s) Inhalation every 6 hours  amLODIPine   Tablet 2.5 milliGRAM(s) Oral at bedtime  aspirin  chewable 81 milliGRAM(s) Oral daily  atorvastatin 40 milliGRAM(s) Oral at bedtime  escitalopram 15 milliGRAM(s) Oral daily  folic acid 1 milliGRAM(s) Oral daily  heparin  Injectable 5000 Unit(s) SubCutaneous every 8 hours  levothyroxine 125 MICROGram(s) Oral daily  LORazepam     Tablet 0.5 milliGRAM(s) Oral two times a day  losartan 100 milliGRAM(s) Oral daily  oxyCODONE  ER Tablet 10 milliGRAM(s) Oral every 12 hours  sodium chloride 0.9% lock flush 3 milliLiter(s) IV Push every 8 hours  tiotropium 18 MICROgram(s) Capsule 1 Capsule(s) Inhalation daily  torsemide 20 milliGRAM(s) Oral daily      Assessment:  -chest pain  -sob  -htn  -copd/asthma  -obesity  -melba    Recs:  -ekg without acute ischemic changes and normal CE. would defer ischemic workup at this time as unlikely to tolerate pharmacologic stress testing and any further downstream/invasive procedures if warranted at this time  -c/w asa and statin for elevated ASCVD risk  -c/w torsemide to maintain euvolemic. would obtain 2d echo  -c/w anti hypertensives. bp adequately controlled  -pulmonary consult appreciated. c/w BD for asthma. patient prefers standing orders  -psych eval underway. f/u recs  -results of CTA chest noted. no acute findings  -PT/OT  -DVT ppx          Over 25 minutes spent on total encounter; more than 50% of the visit was spent counseling and/or coordinating care by the attending physician.      Juan Salcedo MD   Cardiovascular Disease  [phone]

## 2019-05-04 NOTE — PROVIDER CONTACT NOTE (OTHER) - REASON
Elevated Troponin Level
Pt. c/o nebulizer treatment was ineffective and still have chest tightness.
BP parameters

## 2019-05-04 NOTE — PROVIDER CONTACT NOTE (OTHER) - ASSESSMENT
o2- 97%  HR- 75    pt asymptomatic  - no complaints of dizziness
A&Ox4.  Anxiety attack earlier this morning.  C/o chest tightness.  Ativan given prior. On 2L via NC.  Resting comfortably at this time.
Pt. A&Ox 4.  Oxygen sat at 99% on 2L via nc.

## 2019-05-04 NOTE — PROVIDER CONTACT NOTE (OTHER) - SITUATION
Pt bp 99/45
Elevated troponin levels.
Pt. c/o nebulizer treatment was ineffective and still have chest tightness.

## 2019-05-04 NOTE — PROGRESS NOTE ADULT - ASSESSMENT
_________________________________________________________________________________________  ========>>  M E D I C A L   A T T E N D I N G    F O L L O W  U P  N O T E  <<=========  -----------------------------------------------------------------------------------------------------    - Patient seen and examined by me earlier today.   - In summary,  SHEKHAR VASQUEZ is a 68y year old woman who originally presented with CP   - Patient today overall improved, but still feels having asthma attach / chest tightness / "crushing" and states is due to cleaning wipes used to clean the floor..  ( appears anxious)     ==================>> REVIEW OF SYSTEM <<=================    GEN: no fever, no chills, no pain  RESP: SOB as above, no significant cough reported , no sputum  CVS:  chest pain as above, palpitations, no edema  GI: no abdominal pain, no nausea  : no dysuria, no frequency  Neuro: no headache, no dizziness  Derm : no itching, no rash    ==================>> PHYSICAL EXAM <<=================    GEN: A&O X 3 , NAD , comfortable, + anxious  in chair on O2 via NC ( not even in place) ( used for comfort ?)   HEENT: NCAT, PERRL, MMM, hearing intact  Neck: supple , no JVD  CVS: S1S2 , regular   PULM: CTA B/L,  no wheezing or rales noted   ABD.: soft. non tender, non distended,  bowel sounds present  Extrem: intact pulses , no edema   PSYCH : ++ anxious       ==================>> MEDICATIONS <<====================    ALBUTerol    90 MICROgram(s) HFA Inhaler 2 Puff(s) Inhalation every 6 hours  amLODIPine   Tablet 2.5 milliGRAM(s) Oral at bedtime  aspirin  chewable 81 milliGRAM(s) Oral daily  atorvastatin 40 milliGRAM(s) Oral at bedtime  escitalopram 15 milliGRAM(s) Oral daily  folic acid 1 milliGRAM(s) Oral daily  heparin  Injectable 5000 Unit(s) SubCutaneous every 8 hours  levothyroxine 125 MICROGram(s) Oral daily  LORazepam     Tablet 0.5 milliGRAM(s) Oral two times a day  losartan 100 milliGRAM(s) Oral daily  oxyCODONE  ER Tablet 10 milliGRAM(s) Oral every 12 hours  sodium chloride 0.9% lock flush 3 milliLiter(s) IV Push every 8 hours  tiotropium 18 MICROgram(s) Capsule 1 Capsule(s) Inhalation daily  torsemide 20 milliGRAM(s) Oral daily    MEDICATIONS  (PRN):  acetaminophen   Tablet .. 650 milliGRAM(s) Oral every 6 hours PRN Temp greater or equal to 38C (100.4F), Mild Pain (1 - 3)  ALBUTerol/ipratropium for Nebulization 3 milliLiter(s) Nebulizer every 6 hours PRN Shortness of Breath and/or Wheezing  haloperidol     Tablet 1 milliGRAM(s) Oral every 6 hours PRN Agitation  haloperidol    Injectable 1 milliGRAM(s) IV Push every 6 hours PRN Agitation  LORazepam     Tablet 1 milliGRAM(s) Oral every 6 hours PRN Anxiety  melatonin 3 milliGRAM(s) Oral at bedtime PRN Insomnia    ==================>> VITAL SIGNS <<==================    Vital Signs Last 24 Hrs  T(C): 36.6 (05-04-19 @ 10:31)  T(F): 97.9 (05-04-19 @ 10:31), Max: 98.1 (05-03-19 @ 14:00)  HR: 90 (05-04-19 @ 11:44) (60 - 97)  BP: 149/54 (05-04-19 @ 05:39)  RR: 18 (05-04-19 @ 10:31) (16 - 18)  SpO2: 96% (05-04-19 @ 10:31) (96% - 100%)       ==================>> LAB AND IMAGING <<==================                        10.9   8.45  )-----------( 183      ( 04 May 2019 06:10 )             36.3        141  |  100  |  15  ----------------------------<  92  3.9   |  30  |  0.73    Ca    10.2      04 May 2019 06:10  Phos  3.8     05-04  Mg     1.8     05-04      WBC count:   8.45 <<== ,  9.11 <<== ,  9.45 <<== ,  14.04 <<==   Hemoglobin:   10.9 <<==,  11.3 <<==,  11.2 <<==,  12.1 <<==  platelets:  183 <==, 194 <==, 202 <==, 240 <==    Creatinine:  0.73  <<==, 0.79  <<==, 0.66  <<==, 0.63  <<==  Sodium:   141  <==, 141  <==, 142  <==, 143  <==       AST:          14 <== , 21 <==      ALT:        18  <== , 20  <==      AP:        92  <=, 107  <=     Bili:        0.9  <=, 0.7  <=   ___________________________________________________________________________________  ===============>>  A S S E S S M E N T   A N D   P L A N <<===============  ------------------------------------------------------------------------------------------    · Assessment		   69yo F with hx of COPD, breast ca/kidney ca in remission, obesity, NOEMY, S/P laminectomy 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 outpatient ?  need close Psych follow up  pulm and cardio input appreciated     Problem/Plan - 2:  ·  Problem: psychiatric disorder, with  anxiety, possible delusions  appreciated Psych input and medical mgmt  continue meds per Psych  monitor  psych follow up     Problem/Plan - 3:  ·  Problem:  COPD / asthma history , stable   + Duoneb as above   no need for steroids  pulm appreciated    Problem/Plan - 4:  ·  Problem: Hypertension.    + continue home medication with hold parameters   + DASH diet.     Problem/Plan - 5:  ·  Problem: Hyperlipidemia.    + continue atorvastatin 40   + low fat diet.     Problem/Plan - 6:  ·  Problem: Need for prophylactic measure.    + heparin SQ.   Pt and  asking about rehab >> PT >> rehab planing    --------------------------------------------  Case discussed with pt  Education given on findings and plan of care  ___________________________  DIANE Welch D.O.  Pager: [phone]

## 2019-05-04 NOTE — PROGRESS NOTE ADULT - SUBJECTIVE AND OBJECTIVE BOX
PULMONARY PROGRESS NOTE    SHEKHAR VASQUEZ  MRN-[id number]    Patient is a 68y old  Female who presents with a chief complaint of Chest pain r/o ACS (04 May 2019 10:44)      HPI:  still complaining of dyspnea    ROS:   -    ACTIVE MEDICATION LIST:  MEDICATIONS  (STANDING):  ALBUTerol    90 MICROgram(s) HFA Inhaler 2 Puff(s) Inhalation every 6 hours  amLODIPine   Tablet 2.5 milliGRAM(s) Oral at bedtime  aspirin  chewable 81 milliGRAM(s) Oral daily  atorvastatin 40 milliGRAM(s) Oral at bedtime  escitalopram 15 milliGRAM(s) Oral daily  folic acid 1 milliGRAM(s) Oral daily  heparin  Injectable 5000 Unit(s) SubCutaneous every 8 hours  levothyroxine 125 MICROGram(s) Oral daily  LORazepam     Tablet 0.5 milliGRAM(s) Oral two times a day  losartan 100 milliGRAM(s) Oral daily  oxyCODONE  ER Tablet 10 milliGRAM(s) Oral every 12 hours  sodium chloride 0.9% lock flush 3 milliLiter(s) IV Push every 8 hours  tiotropium 18 MICROgram(s) Capsule 1 Capsule(s) Inhalation daily  torsemide 20 milliGRAM(s) Oral daily    MEDICATIONS  (PRN):  acetaminophen   Tablet .. 650 milliGRAM(s) Oral every 6 hours PRN Temp greater or equal to 38C (100.4F), Mild Pain (1 - 3)  ALBUTerol/ipratropium for Nebulization 3 milliLiter(s) Nebulizer every 6 hours PRN Shortness of Breath and/or Wheezing  haloperidol     Tablet 1 milliGRAM(s) Oral every 6 hours PRN Agitation  haloperidol    Injectable 1 milliGRAM(s) IV Push every 6 hours PRN Agitation  LORazepam     Tablet 1 milliGRAM(s) Oral every 6 hours PRN Anxiety  melatonin 3 milliGRAM(s) Oral at bedtime PRN Insomnia      EXAM:  Vital Signs Last 24 Hrs  T(C): 36.6 (04 May 2019 10:31), Max: 36.7 (03 May 2019 14:00)  T(F): 97.9 (04 May 2019 10:31), Max: 98.1 (03 May 2019 14:00)  HR: 90 (04 May 2019 11:44) (60 - 97)  BP: 149/54 (04 May 2019 05:39) (121/61 - 149/54)  BP(mean): --  RR: 18 (04 May 2019 10:31) (16 - 18)  SpO2: 96% (04 May 2019 10:31) (96% - 100%)    GENERAL: The patient is awake and alert in no apparent distress.     LUNGS: Clear to auscultation without wheezing, rales or rhonchi; respirations unlabored    HEART: Regular rate and rhythm without murmur.                            10.9   8.45  )-----------( 183      ( 04 May 2019 06:10 )             36.3       05-04    141  |  100  |  15  ----------------------------<  92  3.9   |  30  |  0.73    Ca    10.2      04 May 2019 06:10  Phos  3.8     05-04  Mg     1.8     05-04          PROBLEM LIST:  68y Female with HEALTH ISSUES - PROBLEM Dx:  Delusion  Need for prophylactic measure: Need for prophylactic measure  Hyperlipidemia: Hyperlipidemia  Asthma: Asthma  Hypertension: Hypertension  COPD (chronic obstructive pulmonary disease): COPD (chronic obstructive pulmonary disease)  Anxiety: Anxiety  Chest tightness: Chest tightness      RECS:  1) continue spiriva   2) would like neb as PRN  3) f/u psych  4) continue NIV QHS      Please call with any questions.    Uma Moses DO  [facility] Pulmonary/Sleep Medicine  [phone]

## 2019-05-05 LAB
ANION GAP SERPL CALC-SCNC: 12 MMO/L — SIGNIFICANT CHANGE UP (ref 7–14)
BUN SERPL-MCNC: 16 MG/DL — SIGNIFICANT CHANGE UP (ref 7–23)
CALCIUM SERPL-MCNC: 10.3 MG/DL — SIGNIFICANT CHANGE UP (ref 8.4–10.5)
CHLORIDE SERPL-SCNC: 100 MMOL/L — SIGNIFICANT CHANGE UP (ref 98–107)
CO2 SERPL-SCNC: 30 MMOL/L — SIGNIFICANT CHANGE UP (ref 22–31)
CREAT SERPL-MCNC: 0.75 MG/DL — SIGNIFICANT CHANGE UP (ref 0.5–1.3)
GLUCOSE SERPL-MCNC: 99 MG/DL — SIGNIFICANT CHANGE UP (ref 70–99)
HCT VFR BLD CALC: 35.4 % — SIGNIFICANT CHANGE UP (ref 34.5–45)
HGB BLD-MCNC: 10.6 G/DL — LOW (ref 11.5–15.5)
MAGNESIUM SERPL-MCNC: 1.8 MG/DL — SIGNIFICANT CHANGE UP (ref 1.6–2.6)
MCHC RBC-ENTMCNC: 27.2 PG — SIGNIFICANT CHANGE UP (ref 27–34)
MCHC RBC-ENTMCNC: 29.9 % — LOW (ref 32–36)
MCV RBC AUTO: 90.8 FL — SIGNIFICANT CHANGE UP (ref 80–100)
NRBC # FLD: 0 K/UL — SIGNIFICANT CHANGE UP (ref 0–0)
PHOSPHATE SERPL-MCNC: 3.7 MG/DL — SIGNIFICANT CHANGE UP (ref 2.5–4.5)
PLATELET # BLD AUTO: 182 K/UL — SIGNIFICANT CHANGE UP (ref 150–400)
PMV BLD: 12 FL — SIGNIFICANT CHANGE UP (ref 7–13)
POTASSIUM SERPL-MCNC: 4.2 MMOL/L — SIGNIFICANT CHANGE UP (ref 3.5–5.3)
POTASSIUM SERPL-SCNC: 4.2 MMOL/L — SIGNIFICANT CHANGE UP (ref 3.5–5.3)
RBC # BLD: 3.9 M/UL — SIGNIFICANT CHANGE UP (ref 3.8–5.2)
RBC # FLD: 15 % — HIGH (ref 10.3–14.5)
SODIUM SERPL-SCNC: 142 MMOL/L — SIGNIFICANT CHANGE UP (ref 135–145)
WBC # BLD: 8.02 K/UL — SIGNIFICANT CHANGE UP (ref 3.8–10.5)
WBC # FLD AUTO: 8.02 K/UL — SIGNIFICANT CHANGE UP (ref 3.8–10.5)

## 2019-05-05 PROCEDURE — 99233 SBSQ HOSP IP/OBS HIGH 50: CPT

## 2019-05-05 RX ORDER — OXYCODONE HYDROCHLORIDE 5 MG/1
5 TABLET ORAL ONCE
Qty: 0 | Refills: 0 | Status: DISCONTINUED | OUTPATIENT
Start: 2019-05-05 | End: 2019-05-05

## 2019-05-05 RX ADMIN — AMLODIPINE BESYLATE 2.5 MILLIGRAM(S): 2.5 TABLET ORAL at 23:51

## 2019-05-05 RX ADMIN — ALBUTEROL 2 PUFF(S): 90 AEROSOL, METERED ORAL at 23:54

## 2019-05-05 RX ADMIN — Medication 1 MILLIGRAM(S): at 23:52

## 2019-05-05 RX ADMIN — OXYCODONE HYDROCHLORIDE 10 MILLIGRAM(S): 5 TABLET ORAL at 10:30

## 2019-05-05 RX ADMIN — Medication 81 MILLIGRAM(S): at 13:23

## 2019-05-05 RX ADMIN — Medication 20 MILLIGRAM(S): at 05:56

## 2019-05-05 RX ADMIN — Medication 0.5 MILLIGRAM(S): at 05:56

## 2019-05-05 RX ADMIN — HEPARIN SODIUM 5000 UNIT(S): 5000 INJECTION INTRAVENOUS; SUBCUTANEOUS at 23:53

## 2019-05-05 RX ADMIN — ALBUTEROL 2 PUFF(S): 90 AEROSOL, METERED ORAL at 04:00

## 2019-05-05 RX ADMIN — LOSARTAN POTASSIUM 100 MILLIGRAM(S): 100 TABLET, FILM COATED ORAL at 05:55

## 2019-05-05 RX ADMIN — HEPARIN SODIUM 5000 UNIT(S): 5000 INJECTION INTRAVENOUS; SUBCUTANEOUS at 13:24

## 2019-05-05 RX ADMIN — Medication 3 MILLILITER(S): at 23:07

## 2019-05-05 RX ADMIN — Medication 0.5 MILLIGRAM(S): at 17:39

## 2019-05-05 RX ADMIN — ATORVASTATIN CALCIUM 40 MILLIGRAM(S): 80 TABLET, FILM COATED ORAL at 23:51

## 2019-05-05 RX ADMIN — SODIUM CHLORIDE 3 MILLILITER(S): 9 INJECTION INTRAMUSCULAR; INTRAVENOUS; SUBCUTANEOUS at 05:44

## 2019-05-05 RX ADMIN — SODIUM CHLORIDE 3 MILLILITER(S): 9 INJECTION INTRAMUSCULAR; INTRAVENOUS; SUBCUTANEOUS at 13:24

## 2019-05-05 RX ADMIN — ESCITALOPRAM OXALATE 15 MILLIGRAM(S): 10 TABLET, FILM COATED ORAL at 13:23

## 2019-05-05 RX ADMIN — OXYCODONE HYDROCHLORIDE 10 MILLIGRAM(S): 5 TABLET ORAL at 09:37

## 2019-05-05 RX ADMIN — OXYCODONE HYDROCHLORIDE 5 MILLIGRAM(S): 5 TABLET ORAL at 16:31

## 2019-05-05 RX ADMIN — Medication 1 MILLIGRAM(S): at 12:00

## 2019-05-05 RX ADMIN — ALBUTEROL 2 PUFF(S): 90 AEROSOL, METERED ORAL at 09:37

## 2019-05-05 RX ADMIN — TIOTROPIUM BROMIDE 1 CAPSULE(S): 18 CAPSULE ORAL; RESPIRATORY (INHALATION) at 09:40

## 2019-05-05 RX ADMIN — Medication 125 MICROGRAM(S): at 05:55

## 2019-05-05 RX ADMIN — Medication 1 MILLIGRAM(S): at 13:23

## 2019-05-05 RX ADMIN — OXYCODONE HYDROCHLORIDE 5 MILLIGRAM(S): 5 TABLET ORAL at 00:10

## 2019-05-05 RX ADMIN — Medication 3 MILLILITER(S): at 18:02

## 2019-05-05 RX ADMIN — OXYCODONE HYDROCHLORIDE 10 MILLIGRAM(S): 5 TABLET ORAL at 23:52

## 2019-05-05 RX ADMIN — HEPARIN SODIUM 5000 UNIT(S): 5000 INJECTION INTRAVENOUS; SUBCUTANEOUS at 05:55

## 2019-05-05 RX ADMIN — SODIUM CHLORIDE 3 MILLILITER(S): 9 INJECTION INTRAMUSCULAR; INTRAVENOUS; SUBCUTANEOUS at 23:53

## 2019-05-05 RX ADMIN — ALBUTEROL 2 PUFF(S): 90 AEROSOL, METERED ORAL at 17:39

## 2019-05-05 RX ADMIN — Medication 3 MILLILITER(S): at 11:52

## 2019-05-05 NOTE — PROGRESS NOTE ADULT - ASSESSMENT
_________________________________________________________________________________________  ========>>  M E D I C A L   A T T E N D I N G    F O L L O W  U P  N O T E  <<=========  -----------------------------------------------------------------------------------------------------    - Patient seen and examined by me earlier today.   - In summary,  SHEKHAR VASQUEZ is a 68y year old woman who originally presented with CP   - Patient today overall improved, but still feels having on and off chest tightness and SOB.  ( appears anxious)     ==================>> REVIEW OF SYSTEM <<=================    GEN: no fever, no chills, no pain  RESP: SOB as above, no significant cough reported , no sputum  CVS:  chest pain as above, palpitations, no edema  GI: no abdominal pain, no nausea  : no dysuria, no frequency  Neuro: no headache, no dizziness  Derm : no itching, no rash    ==================>> PHYSICAL EXAM <<=================    GEN: A&O X 3 , NAD , comfortable, + anxious  in chair on O2 via NC ( not even in place) ( used for comfort ?)   HEENT: NCAT, PERRL, MMM, hearing intact  Neck: supple , no JVD  CVS: S1S2 , regular   PULM: CTA B/L,  no wheezing or rales noted   ABD.: soft. non tender, non distended,  bowel sounds present  Extrem: intact pulses , no edema   PSYCH : ++ anxious         ==================>> MEDICATIONS <<====================    ALBUTerol    90 MICROgram(s) HFA Inhaler 2 Puff(s) Inhalation every 6 hours  ALBUTerol/ipratropium for Nebulization 3 milliLiter(s) Nebulizer once  amLODIPine   Tablet 2.5 milliGRAM(s) Oral at bedtime  aspirin  chewable 81 milliGRAM(s) Oral daily  atorvastatin 40 milliGRAM(s) Oral at bedtime  escitalopram 15 milliGRAM(s) Oral daily  folic acid 1 milliGRAM(s) Oral daily  heparin  Injectable 5000 Unit(s) SubCutaneous every 8 hours  levothyroxine 125 MICROGram(s) Oral daily  LORazepam     Tablet 0.5 milliGRAM(s) Oral two times a day  losartan 100 milliGRAM(s) Oral daily  oxyCODONE  ER Tablet 10 milliGRAM(s) Oral every 12 hours  sodium chloride 0.9% lock flush 3 milliLiter(s) IV Push every 8 hours  tiotropium 18 MICROgram(s) Capsule 1 Capsule(s) Inhalation daily  torsemide 20 milliGRAM(s) Oral daily    MEDICATIONS  (PRN):  acetaminophen   Tablet .. 650 milliGRAM(s) Oral every 6 hours PRN Temp greater or equal to 38C (100.4F), Mild Pain (1 - 3)  ALBUTerol/ipratropium for Nebulization 3 milliLiter(s) Nebulizer every 6 hours PRN Shortness of Breath and/or Wheezing  haloperidol     Tablet 1 milliGRAM(s) Oral every 6 hours PRN Agitation  haloperidol    Injectable 1 milliGRAM(s) IV Push every 6 hours PRN Agitation  LORazepam     Tablet 1 milliGRAM(s) Oral every 6 hours PRN Anxiety  melatonin 3 milliGRAM(s) Oral at bedtime PRN Insomnia    ==================>> VITAL SIGNS <<==================    Vital Signs Last 24 Hrs  T(C): 36.4 (05-05-19 @ 12:42)  T(F): 97.6 (05-05-19 @ 12:42), Max: 98.4 (05-04-19 @ 21:07)  HR: 75 (05-05-19 @ 12:42) (52 - 93)  BP: 135/72 (05-05-19 @ 12:42)  RR: 18 (05-05-19 @ 12:42) (18 - 18)  SpO2: 98% (05-05-19 @ 12:42) (96% - 100%)       ==================>> LAB AND IMAGING <<==================                        10.6   8.02  )-----------( 182      ( 05 May 2019 05:51 )             35.4        142  |  100  |  16  ----------------------------<  99  4.2   |  30  |  0.75    Ca    10.3      05 May 2019 05:51  Phos  3.7     05-05  Mg     1.8     05-05      WBC count:   8.02 <<== ,  8.45 <<== ,  9.11 <<== ,  9.45 <<==   Hemoglobin:   10.6 <<==,  10.9 <<==,  11.3 <<==,  11.2 <<==  platelets:  182 <==, 183 <==, 194 <==, 202 <==, 240 <==    Creatinine:  0.75  <<==, 0.73  <<==, 0.79  <<==, 0.66  <<==, 0.63  <<==  Sodium:   142  <==, 141  <==, 141  <==, 142  <==, 143  <==     ___________________________________________________________________________________  ===============>>  A S S E S S M E N T   A N D   P L A N <<===============  ------------------------------------------------------------------------------------------    · Assessment		   69yo F with hx of COPD, breast ca/kidney ca in remission, obesity, NOEMY, S/P laminectomy 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 outpatient ?  need close Psych follow up  pulm and cardio input appreciated     Problem/Plan - 2:  ·  Problem: psychiatric disorder, with  anxiety, possible delusions  appreciated Psych input and medical mgmt  continue meds per Psych  monitor  psych follow up     Problem/Plan - 3:  ·  Problem:  COPD / asthma history , stable   + Duoneb as above   no need for steroids  pulm appreciated  OP pulm f/u    Problem/Plan - 4:  ·  Problem: Hypertension.    + continue home medication with hold parameters   + DASH diet.     Problem/Plan - 5:  ·  Problem: Hyperlipidemia.    + continue atorvastatin 40   + low fat diet.     Problem/Plan - 6:  ·  Problem: Need for prophylactic measure.    + heparin SQ.     rehab planing    --------------------------------------------  Case discussed with pt  Education given on findings and plan of care  ___________________________  HRenetta Welch D.O.  Pager: [phone]

## 2019-05-05 NOTE — PROGRESS NOTE ADULT - SUBJECTIVE AND OBJECTIVE BOX
PULMONARY PROGRESS NOTE    SHEKHAR VASQUEZ  MRN-[id number]    Patient is a 68y old  Female who presents with a chief complaint of Chest pain r/o ACS (04 May 2019 13:34)    HPI: Still with episodes of tightness in chest  very anxious  -  -  -  -    ROS: denies sob, wheeze  -  -    ACTIVE MEDICATION LIST:  MEDICATIONS  (STANDING):  ALBUTerol    90 MICROgram(s) HFA Inhaler 2 Puff(s) Inhalation every 6 hours  ALBUTerol/ipratropium for Nebulization 3 milliLiter(s) Nebulizer once  amLODIPine   Tablet 2.5 milliGRAM(s) Oral at bedtime  aspirin  chewable 81 milliGRAM(s) Oral daily  atorvastatin 40 milliGRAM(s) Oral at bedtime  escitalopram 15 milliGRAM(s) Oral daily  folic acid 1 milliGRAM(s) Oral daily  heparin  Injectable 5000 Unit(s) SubCutaneous every 8 hours  levothyroxine 125 MICROGram(s) Oral daily  LORazepam     Tablet 0.5 milliGRAM(s) Oral two times a day  losartan 100 milliGRAM(s) Oral daily  oxyCODONE  ER Tablet 10 milliGRAM(s) Oral every 12 hours  sodium chloride 0.9% lock flush 3 milliLiter(s) IV Push every 8 hours  tiotropium 18 MICROgram(s) Capsule 1 Capsule(s) Inhalation daily  torsemide 20 milliGRAM(s) Oral daily    MEDICATIONS  (PRN):  acetaminophen   Tablet .. 650 milliGRAM(s) Oral every 6 hours PRN Temp greater or equal to 38C (100.4F), Mild Pain (1 - 3)  ALBUTerol/ipratropium for Nebulization 3 milliLiter(s) Nebulizer every 6 hours PRN Shortness of Breath and/or Wheezing  haloperidol     Tablet 1 milliGRAM(s) Oral every 6 hours PRN Agitation  haloperidol    Injectable 1 milliGRAM(s) IV Push every 6 hours PRN Agitation  LORazepam     Tablet 1 milliGRAM(s) Oral every 6 hours PRN Anxiety  melatonin 3 milliGRAM(s) Oral at bedtime PRN Insomnia      EXAM:  Vital Signs Last 24 Hrs  T(C): 36.4 (05 May 2019 09:38), Max: 36.9 (04 May 2019 21:07)  T(F): 97.5 (05 May 2019 09:38), Max: 98.4 (04 May 2019 21:07)  HR: 85 (05 May 2019 11:50) (52 - 93)  BP: 114/63 (05 May 2019 09:38) (99/45 - 146/54)  BP(mean): --  RR: 18 (05 May 2019 09:38) (18 - 20)  SpO2: 99% (05 May 2019 11:50) (96% - 100%)    GENERAL: The patient is awake and alert in no apparent distress.     SKIN: Warm, dry, no rashes    LUNGS: Clear to auscultation without wheezing, rales or rhonchi; respirations unlabored    HEART: Regular rate and rhythm without murmur.    ABDOMEN: +BS, Soft, Nontender    EXTREMITIES: No clubbing, cyanosis, edema                              10.6   8.02  )-----------( 182      ( 05 May 2019 05:51 )             35.4       05-05    142  |  100  |  16  ----------------------------<  99  4.2   |  30  |  0.75    Ca    10.3      05 May 2019 05:51  Phos  3.7     05-05  Mg     1.8     05-05    PROBLEM LIST:  68y Female with HEALTH ISSUES - PROBLEM Dx:  Delusion  Need for prophylactic measure: Need for prophylactic measure  Hyperlipidemia: Hyperlipidemia  Asthma: Asthma  Hypertension: Hypertension  COPD (chronic obstructive pulmonary disease): COPD (chronic obstructive pulmonary disease)  Anxiety: Anxiety  Chest tightness: Chest tightness      RECS: Etiology of symptoms unclear-anxiety component  continue current rx        Luke Chu MD  [phone]

## 2019-05-06 LAB
ANION GAP SERPL CALC-SCNC: 10 MMO/L — SIGNIFICANT CHANGE UP (ref 7–14)
BUN SERPL-MCNC: 16 MG/DL — SIGNIFICANT CHANGE UP (ref 7–23)
CALCIUM SERPL-MCNC: 10.8 MG/DL — HIGH (ref 8.4–10.5)
CHLORIDE SERPL-SCNC: 100 MMOL/L — SIGNIFICANT CHANGE UP (ref 98–107)
CO2 SERPL-SCNC: 32 MMOL/L — HIGH (ref 22–31)
CREAT SERPL-MCNC: 0.68 MG/DL — SIGNIFICANT CHANGE UP (ref 0.5–1.3)
GLUCOSE SERPL-MCNC: 96 MG/DL — SIGNIFICANT CHANGE UP (ref 70–99)
HCT VFR BLD CALC: 37.6 % — SIGNIFICANT CHANGE UP (ref 34.5–45)
HGB BLD-MCNC: 11.1 G/DL — LOW (ref 11.5–15.5)
MAGNESIUM SERPL-MCNC: 1.9 MG/DL — SIGNIFICANT CHANGE UP (ref 1.6–2.6)
MCHC RBC-ENTMCNC: 27.1 PG — SIGNIFICANT CHANGE UP (ref 27–34)
MCHC RBC-ENTMCNC: 29.5 % — LOW (ref 32–36)
MCV RBC AUTO: 91.7 FL — SIGNIFICANT CHANGE UP (ref 80–100)
NRBC # FLD: 0 K/UL — SIGNIFICANT CHANGE UP (ref 0–0)
PHOSPHATE SERPL-MCNC: 3.7 MG/DL — SIGNIFICANT CHANGE UP (ref 2.5–4.5)
PLATELET # BLD AUTO: 149 K/UL — LOW (ref 150–400)
PMV BLD: 11.9 FL — SIGNIFICANT CHANGE UP (ref 7–13)
POTASSIUM SERPL-MCNC: 4.1 MMOL/L — SIGNIFICANT CHANGE UP (ref 3.5–5.3)
POTASSIUM SERPL-SCNC: 4.1 MMOL/L — SIGNIFICANT CHANGE UP (ref 3.5–5.3)
RBC # BLD: 4.1 M/UL — SIGNIFICANT CHANGE UP (ref 3.8–5.2)
RBC # FLD: 15 % — HIGH (ref 10.3–14.5)
SODIUM SERPL-SCNC: 142 MMOL/L — SIGNIFICANT CHANGE UP (ref 135–145)
WBC # BLD: 8.08 K/UL — SIGNIFICANT CHANGE UP (ref 3.8–10.5)
WBC # FLD AUTO: 8.08 K/UL — SIGNIFICANT CHANGE UP (ref 3.8–10.5)

## 2019-05-06 PROCEDURE — 99232 SBSQ HOSP IP/OBS MODERATE 35: CPT

## 2019-05-06 PROCEDURE — 93306 TTE W/DOPPLER COMPLETE: CPT | Mod: 26

## 2019-05-06 PROCEDURE — 99233 SBSQ HOSP IP/OBS HIGH 50: CPT

## 2019-05-06 RX ORDER — GABAPENTIN 400 MG/1
200 CAPSULE ORAL THREE TIMES A DAY
Qty: 0 | Refills: 0 | Status: DISCONTINUED | OUTPATIENT
Start: 2019-05-06 | End: 2019-05-07

## 2019-05-06 RX ADMIN — GABAPENTIN 200 MILLIGRAM(S): 400 CAPSULE ORAL at 15:09

## 2019-05-06 RX ADMIN — LOSARTAN POTASSIUM 100 MILLIGRAM(S): 100 TABLET, FILM COATED ORAL at 05:57

## 2019-05-06 RX ADMIN — Medication 20 MILLIGRAM(S): at 05:56

## 2019-05-06 RX ADMIN — SODIUM CHLORIDE 3 MILLILITER(S): 9 INJECTION INTRAMUSCULAR; INTRAVENOUS; SUBCUTANEOUS at 05:58

## 2019-05-06 RX ADMIN — SODIUM CHLORIDE 3 MILLILITER(S): 9 INJECTION INTRAMUSCULAR; INTRAVENOUS; SUBCUTANEOUS at 14:18

## 2019-05-06 RX ADMIN — Medication 3 MILLILITER(S): at 20:19

## 2019-05-06 RX ADMIN — HEPARIN SODIUM 5000 UNIT(S): 5000 INJECTION INTRAVENOUS; SUBCUTANEOUS at 05:56

## 2019-05-06 RX ADMIN — ALBUTEROL 2 PUFF(S): 90 AEROSOL, METERED ORAL at 10:24

## 2019-05-06 RX ADMIN — Medication 125 MICROGRAM(S): at 05:57

## 2019-05-06 RX ADMIN — Medication 81 MILLIGRAM(S): at 13:49

## 2019-05-06 RX ADMIN — GABAPENTIN 200 MILLIGRAM(S): 400 CAPSULE ORAL at 23:00

## 2019-05-06 RX ADMIN — HEPARIN SODIUM 5000 UNIT(S): 5000 INJECTION INTRAVENOUS; SUBCUTANEOUS at 22:55

## 2019-05-06 RX ADMIN — OXYCODONE HYDROCHLORIDE 10 MILLIGRAM(S): 5 TABLET ORAL at 11:01

## 2019-05-06 RX ADMIN — OXYCODONE HYDROCHLORIDE 10 MILLIGRAM(S): 5 TABLET ORAL at 23:00

## 2019-05-06 RX ADMIN — ALBUTEROL 2 PUFF(S): 90 AEROSOL, METERED ORAL at 05:58

## 2019-05-06 RX ADMIN — ATORVASTATIN CALCIUM 40 MILLIGRAM(S): 80 TABLET, FILM COATED ORAL at 22:55

## 2019-05-06 RX ADMIN — Medication 1 MILLIGRAM(S): at 13:50

## 2019-05-06 RX ADMIN — SODIUM CHLORIDE 3 MILLILITER(S): 9 INJECTION INTRAMUSCULAR; INTRAVENOUS; SUBCUTANEOUS at 23:00

## 2019-05-06 RX ADMIN — Medication 3 MILLILITER(S): at 10:49

## 2019-05-06 RX ADMIN — OXYCODONE HYDROCHLORIDE 10 MILLIGRAM(S): 5 TABLET ORAL at 00:21

## 2019-05-06 RX ADMIN — Medication 0.5 MILLIGRAM(S): at 06:05

## 2019-05-06 RX ADMIN — AMLODIPINE BESYLATE 2.5 MILLIGRAM(S): 2.5 TABLET ORAL at 22:55

## 2019-05-06 RX ADMIN — HEPARIN SODIUM 5000 UNIT(S): 5000 INJECTION INTRAVENOUS; SUBCUTANEOUS at 13:50

## 2019-05-06 RX ADMIN — ALBUTEROL 2 PUFF(S): 90 AEROSOL, METERED ORAL at 22:56

## 2019-05-06 RX ADMIN — ALBUTEROL 2 PUFF(S): 90 AEROSOL, METERED ORAL at 16:25

## 2019-05-06 RX ADMIN — ESCITALOPRAM OXALATE 15 MILLIGRAM(S): 10 TABLET, FILM COATED ORAL at 13:48

## 2019-05-06 RX ADMIN — Medication 1 MILLIGRAM(S): at 07:13

## 2019-05-06 RX ADMIN — OXYCODONE HYDROCHLORIDE 10 MILLIGRAM(S): 5 TABLET ORAL at 11:30

## 2019-05-06 RX ADMIN — Medication 1 MILLIGRAM(S): at 20:36

## 2019-05-06 RX ADMIN — Medication 0.5 MILLIGRAM(S): at 18:16

## 2019-05-06 RX ADMIN — TIOTROPIUM BROMIDE 1 CAPSULE(S): 18 CAPSULE ORAL; RESPIRATORY (INHALATION) at 10:25

## 2019-05-06 NOTE — PROGRESS NOTE ADULT - SUBJECTIVE AND OBJECTIVE BOX
PULMONARY PROGRESS NOTE    SHEKHAR VASQUEZ  MRN-[id number]    Patient is a 68y old  Female who presents with a chief complaint of Chest pain r/o ACS (06 May 2019 07:45)      HPI:  -  -    ROS:   -    ACTIVE MEDICATION LIST:  MEDICATIONS  (STANDING):  ALBUTerol    90 MICROgram(s) HFA Inhaler 2 Puff(s) Inhalation every 6 hours  ALBUTerol/ipratropium for Nebulization 3 milliLiter(s) Nebulizer once  amLODIPine   Tablet 2.5 milliGRAM(s) Oral at bedtime  aspirin  chewable 81 milliGRAM(s) Oral daily  atorvastatin 40 milliGRAM(s) Oral at bedtime  escitalopram 15 milliGRAM(s) Oral daily  folic acid 1 milliGRAM(s) Oral daily  heparin  Injectable 5000 Unit(s) SubCutaneous every 8 hours  levothyroxine 125 MICROGram(s) Oral daily  LORazepam     Tablet 0.5 milliGRAM(s) Oral two times a day  losartan 100 milliGRAM(s) Oral daily  oxyCODONE  ER Tablet 10 milliGRAM(s) Oral every 12 hours  sodium chloride 0.9% lock flush 3 milliLiter(s) IV Push every 8 hours  tiotropium 18 MICROgram(s) Capsule 1 Capsule(s) Inhalation daily  torsemide 20 milliGRAM(s) Oral daily    MEDICATIONS  (PRN):  acetaminophen   Tablet .. 650 milliGRAM(s) Oral every 6 hours PRN Temp greater or equal to 38C (100.4F), Mild Pain (1 - 3)  ALBUTerol/ipratropium for Nebulization 3 milliLiter(s) Nebulizer every 6 hours PRN Shortness of Breath and/or Wheezing  haloperidol     Tablet 1 milliGRAM(s) Oral every 6 hours PRN Agitation  haloperidol    Injectable 1 milliGRAM(s) IV Push every 6 hours PRN Agitation  LORazepam     Tablet 1 milliGRAM(s) Oral every 6 hours PRN Anxiety  melatonin 3 milliGRAM(s) Oral at bedtime PRN Insomnia      EXAM:  Vital Signs Last 24 Hrs  T(C): 36.7 (06 May 2019 05:51), Max: 36.9 (05 May 2019 17:02)  T(F): 98 (06 May 2019 05:51), Max: 98.4 (05 May 2019 17:02)  HR: 75 (06 May 2019 07:12) (66 - 102)  BP: 126/64 (06 May 2019 07:12) (104/47 - 136/59)  BP(mean): --  RR: 24 (06 May 2019 07:12) (18 - 24)  SpO2: 98% (06 May 2019 07:12) (98% - 100%)    GENERAL: The patient is awake and alert in no apparent distress.     LUNGS: Clear to auscultation without wheezing, rales or rhonchi; respirations unlabored    HEART: Regular rate and rhythm without murmur.                            11.1   8.08  )-----------( 149      ( 06 May 2019 06:20 )             37.6       05-06    142  |  100  |  16  ----------------------------<  96  4.1   |  32<H>  |  0.68    Ca    10.8<H>      06 May 2019 06:26  Phos  3.7     05-06  Mg     1.9     05-06          PROBLEM LIST:  68y Female with HEALTH ISSUES - PROBLEM Dx:  Delusion  Need for prophylactic measure: Need for prophylactic measure  Hyperlipidemia: Hyperlipidemia  Asthma: Asthma  Hypertension: Hypertension  COPD (chronic obstructive pulmonary disease): COPD (chronic obstructive pulmonary disease)  Anxiety: Anxiety  Chest tightness: Chest tightness            RECS:        Please call with any questions.    Uma Moses DO  [facility] Pulmonary/Sleep Medicine  [phone] PULMONARY PROGRESS NOTE    SHEKHAR VASQUEZ  [id number]    Patient is a 68y old  Female who presents with a chief complaint of Chest pain r/o ACS (06 May 2019 07:45)      HPI:  anxious  complaining of dyspnea. requesting epipen  -    ROS:   -    ACTIVE MEDICATION LIST:  MEDICATIONS  (STANDING):  ALBUTerol    90 MICROgram(s) HFA Inhaler 2 Puff(s) Inhalation every 6 hours  ALBUTerol/ipratropium for Nebulization 3 milliLiter(s) Nebulizer once  amLODIPine   Tablet 2.5 milliGRAM(s) Oral at bedtime  aspirin  chewable 81 milliGRAM(s) Oral daily  atorvastatin 40 milliGRAM(s) Oral at bedtime  escitalopram 15 milliGRAM(s) Oral daily  folic acid 1 milliGRAM(s) Oral daily  heparin  Injectable 5000 Unit(s) SubCutaneous every 8 hours  levothyroxine 125 MICROGram(s) Oral daily  LORazepam     Tablet 0.5 milliGRAM(s) Oral two times a day  losartan 100 milliGRAM(s) Oral daily  oxyCODONE  ER Tablet 10 milliGRAM(s) Oral every 12 hours  sodium chloride 0.9% lock flush 3 milliLiter(s) IV Push every 8 hours  tiotropium 18 MICROgram(s) Capsule 1 Capsule(s) Inhalation daily  torsemide 20 milliGRAM(s) Oral daily    MEDICATIONS  (PRN):  acetaminophen   Tablet .. 650 milliGRAM(s) Oral every 6 hours PRN Temp greater or equal to 38C (100.4F), Mild Pain (1 - 3)  ALBUTerol/ipratropium for Nebulization 3 milliLiter(s) Nebulizer every 6 hours PRN Shortness of Breath and/or Wheezing  haloperidol     Tablet 1 milliGRAM(s) Oral every 6 hours PRN Agitation  haloperidol    Injectable 1 milliGRAM(s) IV Push every 6 hours PRN Agitation  LORazepam     Tablet 1 milliGRAM(s) Oral every 6 hours PRN Anxiety  melatonin 3 milliGRAM(s) Oral at bedtime PRN Insomnia      EXAM:  Vital Signs Last 24 Hrs  T(C): 36.7 (06 May 2019 05:51), Max: 36.9 (05 May 2019 17:02)  T(F): 98 (06 May 2019 05:51), Max: 98.4 (05 May 2019 17:02)  HR: 75 (06 May 2019 07:12) (66 - 102)  BP: 126/64 (06 May 2019 07:12) (104/47 - 136/59)  BP(mean): --  RR: 24 (06 May 2019 07:12) (18 - 24)  SpO2: 98% (06 May 2019 07:12) (98% - 100%)    GENERAL: The patient is awake and alert in no apparent distress.     LUNGS: Clear to auscultation without wheezing, rales or rhonchi; respirations unlabored    HEART: Regular rate and rhythm without murmur.                            11.1   8.08  )-----------( 149      ( 06 May 2019 06:20 )             37.6       05-06    142  |  100  |  16  ----------------------------<  96  4.1   |  32<H>  |  0.68    Ca    10.8<H>      06 May 2019 06:26  Phos  3.7     05-06  Mg     1.9     05-06          PROBLEM LIST:  68y Female with HEALTH ISSUES - PROBLEM Dx:  Delusion  Need for prophylactic measure: Need for prophylactic measure  Hyperlipidemia: Hyperlipidemia  Asthma: Asthma  Hypertension: Hypertension  COPD (chronic obstructive pulmonary disease): COPD (chronic obstructive pulmonary disease)  Anxiety: Anxiety  Chest tightness: Chest tightness      RECS:  1) room air pulse ox -97%  take off oxygen and recheck, to ensure she is above 95%  she may not need oxygen upon discharge  2) neb prn  3) f/u psych      Please call with any questions.    Uma Moses DO  [facility] Pulmonary/Sleep Medicine  [phone]

## 2019-05-06 NOTE — PROGRESS NOTE ADULT - SUBJECTIVE AND OBJECTIVE BOX
Cardiovascular Disease Progress Note    Overnight events: No acute events overnight.  slept okay. no acute events overnight. no new cardiac sx  Otherwise review of systems negative    Objective Findings:  T(C): 36.7 (05-06-19 @ 05:51), Max: 36.9 (05-05-19 @ 17:02)  HR: 75 (05-06-19 @ 07:12) (66 - 102)  BP: 126/64 (05-06-19 @ 07:12) (104/47 - 136/59)  RR: 24 (05-06-19 @ 07:12) (18 - 24)  SpO2: 98% (05-06-19 @ 07:12) (98% - 100%)  Wt(kg): --  Daily     Daily       Physical Exam:  Gen: NAD  HEENT: EOMI  CV: RRR, normal S1 + S2, no m/r/g  Lungs: CTAB  Abd: soft, non-tender  Ext: No edema        Laboratory Data:                        11.1   8.08  )-----------( 149      ( 06 May 2019 06:20 )             37.6     05-05    142  |  100  |  16  ----------------------------<  99  4.2   |  30  |  0.75    Ca    10.3      05 May 2019 05:51  Phos  3.7     05-05  Mg     1.8     05-05                Inpatient Medications:  MEDICATIONS  (STANDING):  ALBUTerol    90 MICROgram(s) HFA Inhaler 2 Puff(s) Inhalation every 6 hours  ALBUTerol/ipratropium for Nebulization 3 milliLiter(s) Nebulizer once  amLODIPine   Tablet 2.5 milliGRAM(s) Oral at bedtime  aspirin  chewable 81 milliGRAM(s) Oral daily  atorvastatin 40 milliGRAM(s) Oral at bedtime  escitalopram 15 milliGRAM(s) Oral daily  folic acid 1 milliGRAM(s) Oral daily  heparin  Injectable 5000 Unit(s) SubCutaneous every 8 hours  levothyroxine 125 MICROGram(s) Oral daily  LORazepam     Tablet 0.5 milliGRAM(s) Oral two times a day  losartan 100 milliGRAM(s) Oral daily  oxyCODONE  ER Tablet 10 milliGRAM(s) Oral every 12 hours  sodium chloride 0.9% lock flush 3 milliLiter(s) IV Push every 8 hours  tiotropium 18 MICROgram(s) Capsule 1 Capsule(s) Inhalation daily  torsemide 20 milliGRAM(s) Oral daily      Assessment:  -chest pain  -sob  -htn  -copd/asthma  -obesity  -melba    Recs:  -ekg without acute ischemic changes and normal CE. would defer ischemic workup at this time as unlikely to tolerate pharmacologic stress testing and any further downstream/invasive procedures if warranted at this time  -c/w asa and statin for elevated ASCVD risk  -c/w torsemide to maintain euvolemic. would obtain 2d echo  -c/w anti hypertensives. bp adequately controlled  -pulmonary consult appreciated. c/w BD for asthma. patient prefers standing orders  -psych eval underway. f/u recs  -results of CTA chest noted. no acute findings  -PT/OT  -DVT ppx  -dispo planning    Over 25 minutes spent on total encounter; more than 50% of the visit was spent counseling and/or coordinating care by the attending physician.      Juan Salcedo MD   Cardiovascular Disease  [phone]

## 2019-05-06 NOTE — PROGRESS NOTE ADULT - ASSESSMENT
_________________________________________________________________________________________  ========>>  M E D I C A L   A T T E N D I N G    F O L L O W  U P  N O T E  <<=========  -----------------------------------------------------------------------------------------------------    - Patient seen and examined by me approximately sixty minutes ago.   - In summary,  SHEKHAR VASQUEZ is a 68y year old woman who originally presented with CP   - Patient today sen comfortably eating breakfast but as soon as she sees me and pulm at bedside, starts clamping her chest saying she has allergies, SOB,, cant breath... ( appears anxious)     ==================>> REVIEW OF SYSTEM <<=================    GEN: no fever, no chills, no pain  RESP: SOB as above, no significant cough reported , no sputum  CVS:  chest pain as above, palpitations, no edema  GI: no abdominal pain, no nausea  : no dysuria, no frequency  Neuro: no headache, no dizziness  Derm : no itching, no rash    ==================>> PHYSICAL EXAM <<=================    GEN: A&O X 3 , NAD , comfortable, + anxious  in chair eating  HEENT: NCAT, PERRL, MMM, hearing intact  Neck: supple , no JVD  CVS: S1S2 , regular   PULM: CTA B/L,  no wheezing or rales noted   ABD.: soft. non tender, non distended,  bowel sounds present  Extrem: intact pulses , no edema   PSYCH : ++ anxious        ==================>> MEDICATIONS <<====================    ALBUTerol    90 MICROgram(s) HFA Inhaler 2 Puff(s) Inhalation every 6 hours  ALBUTerol/ipratropium for Nebulization 3 milliLiter(s) Nebulizer once  amLODIPine   Tablet 2.5 milliGRAM(s) Oral at bedtime  aspirin  chewable 81 milliGRAM(s) Oral daily  atorvastatin 40 milliGRAM(s) Oral at bedtime  escitalopram 15 milliGRAM(s) Oral daily  folic acid 1 milliGRAM(s) Oral daily  heparin  Injectable 5000 Unit(s) SubCutaneous every 8 hours  levothyroxine 125 MICROGram(s) Oral daily  LORazepam     Tablet 0.5 milliGRAM(s) Oral two times a day  losartan 100 milliGRAM(s) Oral daily  oxyCODONE  ER Tablet 10 milliGRAM(s) Oral every 12 hours  sodium chloride 0.9% lock flush 3 milliLiter(s) IV Push every 8 hours  tiotropium 18 MICROgram(s) Capsule 1 Capsule(s) Inhalation daily  torsemide 20 milliGRAM(s) Oral daily    MEDICATIONS  (PRN):  acetaminophen   Tablet .. 650 milliGRAM(s) Oral every 6 hours PRN Temp greater or equal to 38C (100.4F), Mild Pain (1 - 3)  ALBUTerol/ipratropium for Nebulization 3 milliLiter(s) Nebulizer every 6 hours PRN Shortness of Breath and/or Wheezing  haloperidol     Tablet 1 milliGRAM(s) Oral every 6 hours PRN Agitation  haloperidol    Injectable 1 milliGRAM(s) IV Push every 6 hours PRN Agitation  LORazepam     Tablet 1 milliGRAM(s) Oral every 6 hours PRN Anxiety  melatonin 3 milliGRAM(s) Oral at bedtime PRN Insomnia    ==================>> VITAL SIGNS <<==================    Vital Signs Last 24 Hrs  T(C): 36.7 (05-06-19 @ 10:45)  T(F): 98 (05-06-19 @ 10:45), Max: 98.4 (05-05-19 @ 17:02)  HR: 94 (05-06-19 @ 10:49) (66 - 102)  BP: 127/94 (05-06-19 @ 10:45)  RR: 20 (05-06-19 @ 10:45) (18 - 24)  SpO2: 98% (05-06-19 @ 10:49) (98% - 100%)       ==================>> LAB AND IMAGING <<==================                        11.1   8.08  )-----------( 149      ( 06 May 2019 06:20 )             37.6     142  |  100  |  16  ----------------------------<  96  4.1   |  32<H>  |  0.68    Ca    10.8<H>      06 May 2019 06:26  Phos  3.7     05-06  Mg     1.9     05-06    WBC count:   8.08 <<== ,  8.02 <<== ,  8.45 <<== ,  9.11 <<==   Hemoglobin:   11.1 <<==,  10.6 <<==,  10.9 <<==,  11.3 <<==  platelets:  149 <==, 182 <==, 183 <==, 194 <==, 202 <==, 240 <==    Creatinine:  0.68  <<==, 0.75  <<==, 0.73  <<==, 0.79  <<==, 0.66  <<==, 0.63  <<==  Sodium:   142  <==, 142  <==, 141  <==, 141  <==, 142  <==, 143  <==    < from: TTE with Doppler (w/Cont) (05.06.19 @ 08:12) >  CONCLUSIONS:  Technically difficult study.  1. Mitral annular calcification, otherwise normal mitral  valve. Minimal mitral regurgitation.  2. Normal left ventricular internal dimensions and wallthicknesses.  3. Endocardium not well visualized; grossly normal left  ventricular systolic function.  Endocardial visualization  enhanced with intravenous injection of echo contrast (Definity).  4. Unable to accurately evaluate right ventricular size or systolic function.  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, and severe anxiety presents today with high pressure, difficulty breathing and chest tightness     Problem/Plan - 1:  ·  Problem:  chest pain and SOB, atypical, likely anxiety related  appreciated cardio   echo as above  need close Psych follow up  pulm and cardio input appreciated     Problem/Plan - 2:  ·  Problem: psychiatric disorder, with  anxiety, possible delusions  appreciated Psych input and medical mgmt  continue meds per Psych  monitor  psych follow up     Problem/Plan - 3:  ·  Problem:  COPD / asthma history , stable   + Duoneb as above   no need for steroids  pulm appreciated  OP pulm f/u    Problem/Plan - 4:  ·  Problem: Hypertension.    + continue home medication with hold parameters   + DASH diet.     Problem/Plan - 5:  ·  Problem: Hyperlipidemia.    + continue atorvastatin 40   + low fat diet.     Problem/Plan - 6:  ·  Problem: Need for prophylactic measure.    + heparin SQ.     rehab planing    --------------------------------------------  Case discussed with pt, pulm, NP, CM  Education given on findings and plan of care  ___________________________  H. JANICE Welch.  Pager: [phone]

## 2019-05-06 NOTE — PROGRESS NOTE BEHAVIORAL HEALTH - NSBHFUPINTERVALHXFT_PSY_A_CORE
Patient states she feels "better" but still has anxiety related to her SOB. AOx3, denies AH/VH delusions or paranoia of staff or  at this time. Denies si/i/p or hi/i/p.
Patient endorses continued anxiety around her health, feels she has SOB which then makes her anxious which then worsens her SOB. Denies si/i/p or h/i/p , AOx3, denies ah/vh delusions or paranoia.     Discussed need to avoid increasing her ativan as patient is neurotic and anxious and seeks external methods of coping such as using medications - patient in agreement that his may be interfering with her care, also discussed added risks of ativan as she ages including delirium risk.     Patient amenable to starting standing neurontin, off label use and risks/benefits discussed at length with patient and family. Patient/ amenable to use.

## 2019-05-06 NOTE — PROGRESS NOTE BEHAVIORAL HEALTH - NSBHCONSULTFOLLOWAFTERCARE_PSY_A_CORE FT
f/u with Dr. Wood [phone]  f/u ZEE radha in clinic [phone] 9a-7p  Should see psychiatrist at rehab
f/u with Dr. Wood [phone]

## 2019-05-06 NOTE — PROGRESS NOTE BEHAVIORAL HEALTH - AXIS III
asthma, COPD (quit smoking in 1997), HTN, HLD, Grave's disease, hypothyroidism, breast CA, kidney CA, cholecystectemy, laminectomy
asthma, COPD (quit smoking in 1997), HTN, HLD, Grave's disease, hypothyroidism, breast CA, kidney CA, cholecystectemy, laminectomy

## 2019-05-06 NOTE — PROGRESS NOTE BEHAVIORAL HEALTH - NSBHCONSULTMEDS_PSY_A_CORE FT
- lexapro 15mg daily  - c/w ativan 0.5mg BID  - begin neurontin 200mg TID
- lexapro 10mg daily  - xanax 0.5mg BID  - risperidone 1mg qhs

## 2019-05-06 NOTE — PROGRESS NOTE BEHAVIORAL HEALTH - RISK ASSESSMENT
chronically elevated given psych history and prior trials,   Protective factors are no SI/HI, good family support, pt was noted to be future oriented and with stable affect, denied sleep difficulty, substance use or legal issues.    Family collateral report no acute safety concerns
chronically elevated given psych history and prior trials,   Protective factors are no SI/HI, good family support, pt was noted to be future oriented and with stable affect, denied sleep difficulty, substance use or legal issues.    Family collateral report no acute safety concerns

## 2019-05-06 NOTE — PROGRESS NOTE BEHAVIORAL HEALTH - SUMMARY
Pt is a 68 year old, ,  female, living with her  and developmentally disabled adult son, with psychiatric hx of depression, anxiety, psychosis and prior hospitalizations (most recently at [facility] in 11/2015), and extensive medical hx, including asthma, COPD (quit smoking in 1997), HTN, HLD, Grave's disease, hypothyroidism, breast CA, kidney CA, cholecystectemy, laminectomy, admitted for CP and SPB, consulted for anxiety.    Patient still anxious in context of SOB which appears to get better with nebs. Denies si/i/p or hi/i/p, no current features of psychosis though mild chronic paranoia likely remains.     Patient not amenable to seroquel when discussed, would not increase ativan due to risk of delirium, patient OK with neurontin trial off label for anxiety as below.     PLAN  - increase lexapro to 15mg daily  - change xanax standing to ativan 0.5mg BID  - d/c risperdone.   - PRN Ativan 1mg q6hrs  for breakthrough anxiety  - PRN haldol 1mg q6hrs for agitation
Pt is a 68 year old, ,  female, living with her  and developmentally disabled adult son, with psychiatric hx of depression, anxiety, psychosis and prior hospitalizations (most recently at [facility] in 11/2015), and extensive medical hx, including asthma, COPD (quit smoking in 1997), HTN, HLD, Grave's disease, hypothyroidism, breast CA, kidney CA, cholecystectemy, laminectomy, admitted for CP and SPB, consulted for anxiety.    Patient still anxious in context of SOB which appears to get better with nebs. Denies si/i/p or hi/i/p, no current features of psychosis though mild chronic paranoia likely remains. Would increase lexapro to 15mg to help with anxiety, patient had been on 20mg recently but may benefit from slower titration. OK to d/c risperdone, per outpatient records from Dr. Wood patient has not been on this recently.     PLAN  - increase lexapro to 15mg daily  - change xanax standing to ativan 0.5mg BID  - d/c risperdone.   - PRN Ativan 1mg q6hrs  for breakthrough anxiety  - PRN haldol 1mg q6hrs for agitation

## 2019-05-07 ENCOUNTER — TRANSCRIPTION ENCOUNTER (OUTPATIENT)
Age: 69
End: 2019-05-07

## 2019-05-07 VITALS
RESPIRATION RATE: 18 BRPM | SYSTOLIC BLOOD PRESSURE: 101 MMHG | OXYGEN SATURATION: 98 % | HEART RATE: 84 BPM | DIASTOLIC BLOOD PRESSURE: 58 MMHG | TEMPERATURE: 98 F

## 2019-05-07 RX ORDER — ESCITALOPRAM OXALATE 10 MG/1
3 TABLET, FILM COATED ORAL
Qty: 0 | Refills: 0 | DISCHARGE
Start: 2019-05-07

## 2019-05-07 RX ORDER — ACETAMINOPHEN 500 MG
2 TABLET ORAL
Qty: 0 | Refills: 0 | COMMUNITY
Start: 2019-05-07

## 2019-05-07 RX ORDER — ACETAMINOPHEN 500 MG
2 TABLET ORAL
Qty: 0 | Refills: 0 | DISCHARGE
Start: 2019-05-07

## 2019-05-07 RX ORDER — ESCITALOPRAM OXALATE 10 MG/1
2 TABLET, FILM COATED ORAL
Qty: 0 | Refills: 0 | COMMUNITY

## 2019-05-07 RX ORDER — OXYCODONE HYDROCHLORIDE 5 MG/1
1 TABLET ORAL
Qty: 0 | Refills: 0 | COMMUNITY

## 2019-05-07 RX ORDER — ALPRAZOLAM 0.25 MG
1 TABLET ORAL
Qty: 0 | Refills: 0 | COMMUNITY

## 2019-05-07 RX ORDER — GABAPENTIN 400 MG/1
2 CAPSULE ORAL
Qty: 0 | Refills: 0 | DISCHARGE
Start: 2019-05-07

## 2019-05-07 RX ORDER — LANOLIN ALCOHOL/MO/W.PET/CERES
1 CREAM (GRAM) TOPICAL
Qty: 0 | Refills: 0 | DISCHARGE
Start: 2019-05-07

## 2019-05-07 RX ADMIN — OXYCODONE HYDROCHLORIDE 10 MILLIGRAM(S): 5 TABLET ORAL at 11:00

## 2019-05-07 RX ADMIN — Medication 1 MILLIGRAM(S): at 12:37

## 2019-05-07 RX ADMIN — GABAPENTIN 200 MILLIGRAM(S): 400 CAPSULE ORAL at 12:37

## 2019-05-07 RX ADMIN — OXYCODONE HYDROCHLORIDE 10 MILLIGRAM(S): 5 TABLET ORAL at 10:10

## 2019-05-07 RX ADMIN — LOSARTAN POTASSIUM 100 MILLIGRAM(S): 100 TABLET, FILM COATED ORAL at 07:10

## 2019-05-07 RX ADMIN — Medication 1 MILLIGRAM(S): at 10:36

## 2019-05-07 RX ADMIN — Medication 20 MILLIGRAM(S): at 07:10

## 2019-05-07 RX ADMIN — ALBUTEROL 2 PUFF(S): 90 AEROSOL, METERED ORAL at 07:11

## 2019-05-07 RX ADMIN — TIOTROPIUM BROMIDE 1 CAPSULE(S): 18 CAPSULE ORAL; RESPIRATORY (INHALATION) at 10:10

## 2019-05-07 RX ADMIN — SODIUM CHLORIDE 3 MILLILITER(S): 9 INJECTION INTRAMUSCULAR; INTRAVENOUS; SUBCUTANEOUS at 12:38

## 2019-05-07 RX ADMIN — HEPARIN SODIUM 5000 UNIT(S): 5000 INJECTION INTRAVENOUS; SUBCUTANEOUS at 12:37

## 2019-05-07 RX ADMIN — HEPARIN SODIUM 5000 UNIT(S): 5000 INJECTION INTRAVENOUS; SUBCUTANEOUS at 07:10

## 2019-05-07 RX ADMIN — Medication 0.5 MILLIGRAM(S): at 07:11

## 2019-05-07 RX ADMIN — OXYCODONE HYDROCHLORIDE 10 MILLIGRAM(S): 5 TABLET ORAL at 00:23

## 2019-05-07 RX ADMIN — Medication 125 MICROGRAM(S): at 07:10

## 2019-05-07 RX ADMIN — GABAPENTIN 200 MILLIGRAM(S): 400 CAPSULE ORAL at 07:10

## 2019-05-07 RX ADMIN — ALBUTEROL 2 PUFF(S): 90 AEROSOL, METERED ORAL at 13:00

## 2019-05-07 RX ADMIN — Medication 81 MILLIGRAM(S): at 12:38

## 2019-05-07 RX ADMIN — ESCITALOPRAM OXALATE 15 MILLIGRAM(S): 10 TABLET, FILM COATED ORAL at 12:37

## 2019-05-07 RX ADMIN — SODIUM CHLORIDE 3 MILLILITER(S): 9 INJECTION INTRAMUSCULAR; INTRAVENOUS; SUBCUTANEOUS at 07:11

## 2019-05-07 NOTE — DISCHARGE NOTE NURSING/CASE MANAGEMENT/SOCIAL WORK - NSDCPEWEB_GEN_ALL_CORE
[facility] for Tobacco Control website --- [email or web address] website --- [email or web address]

## 2019-05-07 NOTE — DISCHARGE NOTE NURSING/CASE MANAGEMENT/SOCIAL WORK - NSDCDPATPORTLINK_GEN_ALL_CORE
You can access the [facility] Patient Portal, offered by [facility], by registering with the following website: [email or web address]

## 2019-05-07 NOTE — PROGRESS NOTE ADULT - SUBJECTIVE AND OBJECTIVE BOX
Cardiovascular Disease Progress Note    Overnight events: No acute events overnight.  still complaining of ocassional asthma attacks. currently comfortable without any new cardiac sx  Otherwise review of systems negative    Objective Findings:  T(C): 36.7 (19 @ 07:08), Max: 36.9 (19 @ 17:06)  HR: 68 (19 @ 07:13) (60 - 97)  BP: 130/66 (19 @ 07:08) (105/55 - 131/44)  RR: 18 (19 @ 07:08) (18 - 20)  SpO2: 97% (19 @ 07:13) (96% - 100%)  Wt(kg): --  Daily     Daily Weight in k.8 (07 May 2019 02:01)      Physical Exam:  Gen: NAD  HEENT: EOMI  CV: RRR, normal S1 + S2, no m/r/g  Lungs: CTAB  Abd: soft, non-tender  Ext: No edema        Laboratory Data:                        11.1   8.08  )-----------( 149      ( 06 May 2019 06:20 )             37.6     05-06    142  |  100  |  16  ----------------------------<  96  4.1   |  32<H>  |  0.68    Ca    10.8<H>      06 May 2019 06:26  Phos  3.7     05-06  Mg     1.9     05-06                Inpatient Medications:  MEDICATIONS  (STANDING):  ALBUTerol    90 MICROgram(s) HFA Inhaler 2 Puff(s) Inhalation every 6 hours  ALBUTerol/ipratropium for Nebulization 3 milliLiter(s) Nebulizer once  amLODIPine   Tablet 2.5 milliGRAM(s) Oral at bedtime  aspirin  chewable 81 milliGRAM(s) Oral daily  atorvastatin 40 milliGRAM(s) Oral at bedtime  escitalopram 15 milliGRAM(s) Oral daily  folic acid 1 milliGRAM(s) Oral daily  gabapentin 200 milliGRAM(s) Oral three times a day  heparin  Injectable 5000 Unit(s) SubCutaneous every 8 hours  levothyroxine 125 MICROGram(s) Oral daily  LORazepam     Tablet 0.5 milliGRAM(s) Oral two times a day  losartan 100 milliGRAM(s) Oral daily  oxyCODONE  ER Tablet 10 milliGRAM(s) Oral every 12 hours  sodium chloride 0.9% lock flush 3 milliLiter(s) IV Push every 8 hours  tiotropium 18 MICROgram(s) Capsule 1 Capsule(s) Inhalation daily  torsemide 20 milliGRAM(s) Oral daily      Assessment:  -chest pain  -sob  -htn  -copd/asthma  -obesity  -melba    Recs:  -ekg without acute ischemic changes and normal CE. would defer ischemic workup at this time as unlikely to tolerate pharmacologic stress testing and any further downstream/invasive procedures if warranted at this time  -c/w asa and statin for elevated ASCVD risk  -c/w torsemide to maintain euvolemic. tte withuot any acute findings  -c/w anti hypertensives. bp adequately controlled  -pulmonary consult appreciated. c/w BD for asthma. patient prefers standing orders  -psych eval underway. f/u recs  -results of CTA chest noted. no acute findings  -PT/OT  -DVT ppx  -dispo planning        Over 25 minutes spent on total encounter; more than 50% of the visit was spent counseling and/or coordinating care by the attending physician.      Juan Salcedo MD   Cardiovascular Disease  [phone]

## 2019-05-07 NOTE — DISCHARGE NOTE NURSING/CASE MANAGEMENT/SOCIAL WORK - NSDCPEEMAIL_GEN_ALL_CORE
[facility] email [email or web address]

## 2019-05-07 NOTE — PROGRESS NOTE ADULT - ASSESSMENT
M E D I C A L   A T T E N D I N G    F O L L O W    U P   N O T E                                     ------------------------------------------------------------------------------------------------    patient evaluated by me, case discussed with team, chart, medications, and physical exam reviewed, labs / tests  and vitals reviewed by me, as remedios.   Patient is stable for discharge today to rehab.  Patient to follow up with  PMD, Psych, pulm, cardio   See discharge document for full note.      ==================>> MEDICATIONS <<====================    ALBUTerol    90 MICROgram(s) HFA Inhaler 2 Puff(s) Inhalation every 6 hours  ALBUTerol/ipratropium for Nebulization 3 milliLiter(s) Nebulizer once  amLODIPine   Tablet 2.5 milliGRAM(s) Oral at bedtime  aspirin  chewable 81 milliGRAM(s) Oral daily  atorvastatin 40 milliGRAM(s) Oral at bedtime  escitalopram 15 milliGRAM(s) Oral daily  folic acid 1 milliGRAM(s) Oral daily  gabapentin 200 milliGRAM(s) Oral three times a day  heparin  Injectable 5000 Unit(s) SubCutaneous every 8 hours  levothyroxine 125 MICROGram(s) Oral daily  LORazepam     Tablet 0.5 milliGRAM(s) Oral two times a day  losartan 100 milliGRAM(s) Oral daily  oxyCODONE  ER Tablet 10 milliGRAM(s) Oral every 12 hours  sodium chloride 0.9% lock flush 3 milliLiter(s) IV Push every 8 hours  tiotropium 18 MICROgram(s) Capsule 1 Capsule(s) Inhalation daily  torsemide 20 milliGRAM(s) Oral daily    MEDICATIONS  (PRN):  acetaminophen   Tablet .. 650 milliGRAM(s) Oral every 6 hours PRN Temp greater or equal to 38C (100.4F), Mild Pain (1 - 3)  ALBUTerol/ipratropium for Nebulization 3 milliLiter(s) Nebulizer every 6 hours PRN Shortness of Breath and/or Wheezing  haloperidol     Tablet 1 milliGRAM(s) Oral every 6 hours PRN Agitation  haloperidol    Injectable 1 milliGRAM(s) IV Push every 6 hours PRN Agitation  LORazepam     Tablet 1 milliGRAM(s) Oral every 6 hours PRN Anxiety  melatonin 3 milliGRAM(s) Oral at bedtime PRN Insomnia    ==================>> VITAL SIGNS <<==================    T(C): 36.7 (05-07-19 @ 10:21), Max: 36.9 (05-06-19 @ 17:06)  HR: 84 (05-07-19 @ 10:21) (60 - 90)  BP: 101/58 (05-07-19 @ 10:21) (101/58 - 130/66)  RR: 18 (05-07-19 @ 10:21) (18 - 20)  SpO2: 98% (05-07-19 @ 10:21) (97% - 100%)       ==================>> LAB AND IMAGING <<==================                        11.1   8.08  )-----------( 149      ( 06 May 2019 06:20 )             37.6        05-06    142  |  100  |  16  ----------------------------<  96  4.1   |  32<H>  |  0.68    Ca    10.8<H>      06 May 2019 06:26  Phos  3.7     05-06  Mg     1.9     05-06    TSH:      2.53   (05-01-19)           Lipid profile:  (05-01-19)     Total: 140     LDL  : 73     HDL  :55     TG   :101     WBC count:   8.08 <<== ,  8.02 <<== ,  8.45 <<== ,  9.11 <<==   Hemoglobin:   11.1 <<==,  10.6 <<==,  10.9 <<==,  11.3 <<==  platelets:  149 <==, 182 <==, 183 <==, 194 <==    Creatinine:  0.68  <<==, 0.75  <<==, 0.73  <<==, 0.79  <<==  Sodium:   142  <==, 142  <==, 141  <==, 141  <==

## 2019-08-17 ENCOUNTER — INPATIENT (INPATIENT)
Facility: HOSPITAL | Age: 69
LOS: 2 days | Discharge: NURSING, REHAB OR LONG-TERM CARE | End: 2019-08-20
Attending: GENERAL PRACTICE | Admitting: GENERAL PRACTICE
Payer: MEDICARE

## 2019-08-17 VITALS
TEMPERATURE: 98 F | OXYGEN SATURATION: 98 % | HEART RATE: 72 BPM | SYSTOLIC BLOOD PRESSURE: 111 MMHG | RESPIRATION RATE: 20 BRPM | DIASTOLIC BLOOD PRESSURE: 50 MMHG

## 2019-08-17 DIAGNOSIS — L03.119 CELLULITIS OF UNSPECIFIED PART OF LIMB: ICD-10-CM

## 2019-08-17 DIAGNOSIS — I87.2 VENOUS INSUFFICIENCY (CHRONIC) (PERIPHERAL): ICD-10-CM

## 2019-08-17 DIAGNOSIS — I83.893 VARICOSE VEINS OF BILATERAL LOWER EXTREMITIES WITH OTHER COMPLICATIONS: ICD-10-CM

## 2019-08-17 DIAGNOSIS — Z98.89 OTHER SPECIFIED POSTPROCEDURAL STATES: Chronic | ICD-10-CM

## 2019-08-17 DIAGNOSIS — M79.89 OTHER SPECIFIED SOFT TISSUE DISORDERS: ICD-10-CM

## 2019-08-17 DIAGNOSIS — I89.0 LYMPHEDEMA, NOT ELSEWHERE CLASSIFIED: ICD-10-CM

## 2019-08-17 DIAGNOSIS — Z90.5 ACQUIRED ABSENCE OF KIDNEY: Chronic | ICD-10-CM

## 2019-08-17 DIAGNOSIS — Z90.49 ACQUIRED ABSENCE OF OTHER SPECIFIED PARTS OF DIGESTIVE TRACT: Chronic | ICD-10-CM

## 2019-08-17 DIAGNOSIS — L03.116 CELLULITIS OF LEFT LOWER LIMB: ICD-10-CM

## 2019-08-17 LAB
ALBUMIN SERPL ELPH-MCNC: 4 G/DL — SIGNIFICANT CHANGE UP (ref 3.3–5)
ALP SERPL-CCNC: 99 U/L — SIGNIFICANT CHANGE UP (ref 40–120)
ALT FLD-CCNC: 14 U/L — SIGNIFICANT CHANGE UP (ref 4–33)
ANION GAP SERPL CALC-SCNC: 10 MMO/L — SIGNIFICANT CHANGE UP (ref 7–14)
APPEARANCE UR: CLEAR — SIGNIFICANT CHANGE UP
APTT BLD: 36.5 SEC — HIGH (ref 27.5–36.3)
AST SERPL-CCNC: 17 U/L — SIGNIFICANT CHANGE UP (ref 4–32)
BACTERIA # UR AUTO: HIGH
BASE EXCESS BLDV CALC-SCNC: 5.8 MMOL/L — SIGNIFICANT CHANGE UP
BILIRUB SERPL-MCNC: 0.7 MG/DL — SIGNIFICANT CHANGE UP (ref 0.2–1.2)
BILIRUB UR-MCNC: NEGATIVE — SIGNIFICANT CHANGE UP
BLOOD GAS VENOUS - CREATININE: 0.74 MG/DL — SIGNIFICANT CHANGE UP (ref 0.5–1.3)
BLOOD UR QL VISUAL: NEGATIVE — SIGNIFICANT CHANGE UP
BUN SERPL-MCNC: 18 MG/DL — SIGNIFICANT CHANGE UP (ref 7–23)
CALCIUM SERPL-MCNC: 10.1 MG/DL — SIGNIFICANT CHANGE UP (ref 8.4–10.5)
CHLORIDE BLDV-SCNC: 101 MMOL/L — SIGNIFICANT CHANGE UP (ref 96–108)
CHLORIDE SERPL-SCNC: 99 MMOL/L — SIGNIFICANT CHANGE UP (ref 98–107)
CO2 SERPL-SCNC: 32 MMOL/L — HIGH (ref 22–31)
COLOR SPEC: SIGNIFICANT CHANGE UP
CREAT SERPL-MCNC: 0.69 MG/DL — SIGNIFICANT CHANGE UP (ref 0.5–1.3)
GAS PNL BLDV: 139 MMOL/L — SIGNIFICANT CHANGE UP (ref 136–146)
GLUCOSE BLDV-MCNC: 106 MG/DL — HIGH (ref 70–99)
GLUCOSE SERPL-MCNC: 114 MG/DL — HIGH (ref 70–99)
GLUCOSE UR-MCNC: NEGATIVE — SIGNIFICANT CHANGE UP
HCO3 BLDV-SCNC: 28 MMOL/L — HIGH (ref 20–27)
HCT VFR BLD CALC: 37.4 % — SIGNIFICANT CHANGE UP (ref 34.5–45)
HCT VFR BLDV CALC: 34.3 % — LOW (ref 34.5–45)
HGB BLD-MCNC: 10.8 G/DL — LOW (ref 11.5–15.5)
HGB BLDV-MCNC: 11.1 G/DL — LOW (ref 11.5–15.5)
HYALINE CASTS # UR AUTO: NEGATIVE — SIGNIFICANT CHANGE UP
INR BLD: 1.09 — SIGNIFICANT CHANGE UP (ref 0.88–1.17)
KETONES UR-MCNC: NEGATIVE — SIGNIFICANT CHANGE UP
LACTATE BLDV-MCNC: 1.8 MMOL/L — SIGNIFICANT CHANGE UP (ref 0.5–2)
LEUKOCYTE ESTERASE UR-ACNC: SIGNIFICANT CHANGE UP
MCHC RBC-ENTMCNC: 26.6 PG — LOW (ref 27–34)
MCHC RBC-ENTMCNC: 28.9 % — LOW (ref 32–36)
MCV RBC AUTO: 92.1 FL — SIGNIFICANT CHANGE UP (ref 80–100)
NITRITE UR-MCNC: NEGATIVE — SIGNIFICANT CHANGE UP
NRBC # FLD: 0 K/UL — SIGNIFICANT CHANGE UP (ref 0–0)
NT-PROBNP SERPL-SCNC: 60.82 PG/ML — SIGNIFICANT CHANGE UP
PCO2 BLDV: 59 MMHG — HIGH (ref 41–51)
PH BLDV: 7.34 PH — SIGNIFICANT CHANGE UP (ref 7.32–7.43)
PH UR: 7 — SIGNIFICANT CHANGE UP (ref 5–8)
PLATELET # BLD AUTO: 203 K/UL — SIGNIFICANT CHANGE UP (ref 150–400)
PMV BLD: 11.4 FL — SIGNIFICANT CHANGE UP (ref 7–13)
PO2 BLDV: 36 MMHG — SIGNIFICANT CHANGE UP (ref 35–40)
POTASSIUM BLDV-SCNC: 3.7 MMOL/L — SIGNIFICANT CHANGE UP (ref 3.4–4.5)
POTASSIUM SERPL-MCNC: 4.2 MMOL/L — SIGNIFICANT CHANGE UP (ref 3.5–5.3)
POTASSIUM SERPL-SCNC: 4.2 MMOL/L — SIGNIFICANT CHANGE UP (ref 3.5–5.3)
PROT SERPL-MCNC: 7.2 G/DL — SIGNIFICANT CHANGE UP (ref 6–8.3)
PROT UR-MCNC: NEGATIVE — SIGNIFICANT CHANGE UP
PROTHROM AB SERPL-ACNC: 12.1 SEC — SIGNIFICANT CHANGE UP (ref 9.8–13.1)
RBC # BLD: 4.06 M/UL — SIGNIFICANT CHANGE UP (ref 3.8–5.2)
RBC # FLD: 15.4 % — HIGH (ref 10.3–14.5)
RBC CASTS # UR COMP ASSIST: SIGNIFICANT CHANGE UP (ref 0–?)
SAO2 % BLDV: 56.7 % — LOW (ref 60–85)
SODIUM SERPL-SCNC: 141 MMOL/L — SIGNIFICANT CHANGE UP (ref 135–145)
SP GR SPEC: 1.01 — SIGNIFICANT CHANGE UP (ref 1–1.04)
SQUAMOUS # UR AUTO: SIGNIFICANT CHANGE UP
TROPONIN T, HIGH SENSITIVITY: 54 NG/L — CRITICAL HIGH (ref ?–14)
TROPONIN T, HIGH SENSITIVITY: 63 NG/L — CRITICAL HIGH (ref ?–14)
UROBILINOGEN FLD QL: NORMAL — SIGNIFICANT CHANGE UP
WBC # BLD: 13.34 K/UL — HIGH (ref 3.8–10.5)
WBC # FLD AUTO: 13.34 K/UL — HIGH (ref 3.8–10.5)
WBC UR QL: HIGH (ref 0–?)

## 2019-08-17 PROCEDURE — 99223 1ST HOSP IP/OBS HIGH 75: CPT | Mod: GC

## 2019-08-17 PROCEDURE — 93970 EXTREMITY STUDY: CPT | Mod: 26

## 2019-08-17 PROCEDURE — 71045 X-RAY EXAM CHEST 1 VIEW: CPT | Mod: 26

## 2019-08-17 RX ORDER — CITALOPRAM 10 MG/1
20 TABLET, FILM COATED ORAL DAILY
Refills: 0 | Status: DISCONTINUED | OUTPATIENT
Start: 2019-08-17 | End: 2019-08-20

## 2019-08-17 RX ORDER — LANOLIN ALCOHOL/MO/W.PET/CERES
3 CREAM (GRAM) TOPICAL AT BEDTIME
Refills: 0 | Status: DISCONTINUED | OUTPATIENT
Start: 2019-08-17 | End: 2019-08-20

## 2019-08-17 RX ORDER — LEVOTHYROXINE SODIUM 125 MCG
125 TABLET ORAL DAILY
Refills: 0 | Status: DISCONTINUED | OUTPATIENT
Start: 2019-08-17 | End: 2019-08-20

## 2019-08-17 RX ORDER — ASPIRIN/CALCIUM CARB/MAGNESIUM 324 MG
324 TABLET ORAL ONCE
Refills: 0 | Status: COMPLETED | OUTPATIENT
Start: 2019-08-17 | End: 2019-08-17

## 2019-08-17 RX ORDER — ALBUTEROL 90 UG/1
2.5 AEROSOL, METERED ORAL EVERY 6 HOURS
Refills: 0 | Status: DISCONTINUED | OUTPATIENT
Start: 2019-08-17 | End: 2019-08-20

## 2019-08-17 RX ORDER — ENOXAPARIN SODIUM 100 MG/ML
40 INJECTION SUBCUTANEOUS DAILY
Refills: 0 | Status: DISCONTINUED | OUTPATIENT
Start: 2019-08-17 | End: 2019-08-20

## 2019-08-17 RX ORDER — OXYCODONE HYDROCHLORIDE 5 MG/1
5 TABLET ORAL ONCE
Refills: 0 | Status: DISCONTINUED | OUTPATIENT
Start: 2019-08-17 | End: 2019-08-17

## 2019-08-17 RX ORDER — FUROSEMIDE 40 MG
40 TABLET ORAL DAILY
Refills: 0 | Status: DISCONTINUED | OUTPATIENT
Start: 2019-08-17 | End: 2019-08-19

## 2019-08-17 RX ORDER — IPRATROPIUM/ALBUTEROL SULFATE 18-103MCG
3 AEROSOL WITH ADAPTER (GRAM) INHALATION ONCE
Refills: 0 | Status: COMPLETED | OUTPATIENT
Start: 2019-08-17 | End: 2019-08-17

## 2019-08-17 RX ORDER — ATORVASTATIN CALCIUM 80 MG/1
40 TABLET, FILM COATED ORAL AT BEDTIME
Refills: 0 | Status: DISCONTINUED | OUTPATIENT
Start: 2019-08-17 | End: 2019-08-20

## 2019-08-17 RX ORDER — ACETAMINOPHEN 500 MG
650 TABLET ORAL EVERY 6 HOURS
Refills: 0 | Status: DISCONTINUED | OUTPATIENT
Start: 2019-08-17 | End: 2019-08-20

## 2019-08-17 RX ORDER — TIOTROPIUM BROMIDE 18 UG/1
1 CAPSULE ORAL; RESPIRATORY (INHALATION) DAILY
Refills: 0 | Status: DISCONTINUED | OUTPATIENT
Start: 2019-08-17 | End: 2019-08-20

## 2019-08-17 RX ORDER — OXYCODONE HYDROCHLORIDE 5 MG/1
10 TABLET ORAL EVERY 6 HOURS
Refills: 0 | Status: DISCONTINUED | OUTPATIENT
Start: 2019-08-17 | End: 2019-08-20

## 2019-08-17 RX ORDER — GABAPENTIN 400 MG/1
100 CAPSULE ORAL EVERY 8 HOURS
Refills: 0 | Status: DISCONTINUED | OUTPATIENT
Start: 2019-08-17 | End: 2019-08-20

## 2019-08-17 RX ORDER — ALBUTEROL 90 UG/1
2 AEROSOL, METERED ORAL EVERY 6 HOURS
Refills: 0 | Status: DISCONTINUED | OUTPATIENT
Start: 2019-08-17 | End: 2019-08-18

## 2019-08-17 RX ORDER — LACTOBACILLUS ACIDOPHILUS 100MM CELL
1 CAPSULE ORAL
Refills: 0 | Status: DISCONTINUED | OUTPATIENT
Start: 2019-08-17 | End: 2019-08-20

## 2019-08-17 RX ORDER — DOCUSATE SODIUM 100 MG
100 CAPSULE ORAL THREE TIMES A DAY
Refills: 0 | Status: DISCONTINUED | OUTPATIENT
Start: 2019-08-17 | End: 2019-08-20

## 2019-08-17 RX ORDER — AMLODIPINE BESYLATE 2.5 MG/1
2.5 TABLET ORAL DAILY
Refills: 0 | Status: DISCONTINUED | OUTPATIENT
Start: 2019-08-17 | End: 2019-08-17

## 2019-08-17 RX ORDER — AMLODIPINE BESYLATE 2.5 MG/1
2.5 TABLET ORAL DAILY
Refills: 0 | Status: DISCONTINUED | OUTPATIENT
Start: 2019-08-17 | End: 2019-08-20

## 2019-08-17 RX ORDER — FOLIC ACID 0.8 MG
1 TABLET ORAL DAILY
Refills: 0 | Status: DISCONTINUED | OUTPATIENT
Start: 2019-08-17 | End: 2019-08-20

## 2019-08-17 RX ORDER — ASPIRIN/CALCIUM CARB/MAGNESIUM 324 MG
81 TABLET ORAL DAILY
Refills: 0 | Status: DISCONTINUED | OUTPATIENT
Start: 2019-08-17 | End: 2019-08-20

## 2019-08-17 RX ORDER — LOSARTAN POTASSIUM 100 MG/1
100 TABLET, FILM COATED ORAL DAILY
Refills: 0 | Status: DISCONTINUED | OUTPATIENT
Start: 2019-08-17 | End: 2019-08-20

## 2019-08-17 RX ORDER — LOSARTAN POTASSIUM 100 MG/1
100 TABLET, FILM COATED ORAL DAILY
Refills: 0 | Status: DISCONTINUED | OUTPATIENT
Start: 2019-08-17 | End: 2019-08-17

## 2019-08-17 RX ADMIN — ALBUTEROL 2.5 MILLIGRAM(S): 90 AEROSOL, METERED ORAL at 15:29

## 2019-08-17 RX ADMIN — Medication 3 MILLILITER(S): at 13:32

## 2019-08-17 RX ADMIN — Medication 100 MILLIGRAM(S): at 14:18

## 2019-08-17 RX ADMIN — GABAPENTIN 100 MILLIGRAM(S): 400 CAPSULE ORAL at 14:25

## 2019-08-17 RX ADMIN — ATORVASTATIN CALCIUM 40 MILLIGRAM(S): 80 TABLET, FILM COATED ORAL at 21:40

## 2019-08-17 RX ADMIN — OXYCODONE HYDROCHLORIDE 10 MILLIGRAM(S): 5 TABLET ORAL at 21:07

## 2019-08-17 RX ADMIN — OXYCODONE HYDROCHLORIDE 5 MILLIGRAM(S): 5 TABLET ORAL at 08:57

## 2019-08-17 RX ADMIN — OXYCODONE HYDROCHLORIDE 10 MILLIGRAM(S): 5 TABLET ORAL at 20:39

## 2019-08-17 RX ADMIN — Medication 3 MILLIGRAM(S): at 21:40

## 2019-08-17 RX ADMIN — Medication 100 MILLIGRAM(S): at 21:40

## 2019-08-17 RX ADMIN — ALBUTEROL 2.5 MILLIGRAM(S): 90 AEROSOL, METERED ORAL at 20:02

## 2019-08-17 RX ADMIN — ENOXAPARIN SODIUM 40 MILLIGRAM(S): 100 INJECTION SUBCUTANEOUS at 17:34

## 2019-08-17 RX ADMIN — Medication 324 MILLIGRAM(S): at 07:58

## 2019-08-17 RX ADMIN — Medication 125 MICROGRAM(S): at 14:35

## 2019-08-17 RX ADMIN — Medication 3 MILLILITER(S): at 07:58

## 2019-08-17 RX ADMIN — OXYCODONE HYDROCHLORIDE 10 MILLIGRAM(S): 5 TABLET ORAL at 14:29

## 2019-08-17 RX ADMIN — Medication 3 MILLILITER(S): at 08:58

## 2019-08-17 RX ADMIN — Medication 3 MILLILITER(S): at 07:59

## 2019-08-17 RX ADMIN — OXYCODONE HYDROCHLORIDE 5 MILLIGRAM(S): 5 TABLET ORAL at 10:00

## 2019-08-17 RX ADMIN — GABAPENTIN 100 MILLIGRAM(S): 400 CAPSULE ORAL at 21:40

## 2019-08-17 RX ADMIN — Medication 3 MILLILITER(S): at 11:39

## 2019-08-17 RX ADMIN — Medication 1 MILLIGRAM(S): at 21:40

## 2019-08-17 RX ADMIN — CITALOPRAM 20 MILLIGRAM(S): 10 TABLET, FILM COATED ORAL at 14:33

## 2019-08-17 RX ADMIN — Medication 1 MILLIGRAM(S): at 17:34

## 2019-08-17 RX ADMIN — Medication 40 MILLIGRAM(S): at 10:59

## 2019-08-17 RX ADMIN — Medication 0.5 MILLIGRAM(S): at 15:47

## 2019-08-17 RX ADMIN — Medication 0.5 MILLIGRAM(S): at 11:43

## 2019-08-17 NOTE — ED PROVIDER NOTE - NS ED ROS FT
ROS: denies HA, weakness, dizziness, fevers/chills, nausea/vomiting, diaphoresis, abdominal pain, diarrhea, joint pain, neuro deficits, dysuria/hematuria, rash    +leg swelling, CP/SOB

## 2019-08-17 NOTE — H&P ADULT - NSHPREVIEWOFSYSTEMS_GEN_ALL_CORE
GEN: no fever, no chills, no pain  RESP: mild chronic SOB, no cough, no sputum  CVS: no chest pain at rest , no palpitations, + edema as above   GI: no abdominal pain, no nausea, no vomiting, no constipation, no diarrhea  : no dysuria, no frequency, no hematuria  NEURO: no headache, no dizziness  PSYCH: no depression, not anxious  Derm : no itching, no rash

## 2019-08-17 NOTE — H&P ADULT - PROBLEM SELECTOR PLAN 1
leg swelling as above with leukocytosis, tender to tough, warmth, redness     likely early left sided cellulitis  ID consulted  will start clinda for now + bacid   leg elevation   leg wrapping if negative DVT   pain mgmt  supportive care

## 2019-08-17 NOTE — CONSULT NOTE ADULT - EXTREMITIES COMMENTS
mod  venous isnuff w mod to severe edema and mod st dermatitis and mau le cellulitis from ankles to mid thigh  mild to mod lymphedema

## 2019-08-17 NOTE — CONSULT NOTE ADULT - SUBJECTIVE AND OBJECTIVE BOX
Consulting surgical team: C Team [id number]  Consulting attending: Dr. Angel    HPI:  69 yo woman with a hx of COPD, breast CA, kidney CA in remission, NOEMY, anxiety, HTN, presenting for evaluation of worsening bilateral leg swelling and chest pain for which she is undergoing a cardiac evaluation. She reports chronic leg swelling present for several years, improved by compression and elevation. She is able to ambulate approx 100 feet. She denies drainage or wounds. She reports she is unable to lie flat and sleeps propped up on pillows. She is on torsemide at home.      PAST MEDICAL HISTORY:  Asthma  Sleep apnea  Obesity  DVT (deep venous thrombosis)  Hypothyroid  COPD (chronic obstructive pulmonary disease)  Breast cancer in situ, left  Kidney cancer, primary, with metastasis from kidney to other site, left  Graves disease  Anxiety  Hyperlipidemia  Hypertension      PAST SURGICAL HISTORY:  S/P laminectomy  History of parathyroidectomy  History of carpal tunnel release  History of eye surgery  History of pelvic surgery  S/P breast biopsy  S/p nephrectomy  S/P cholecystectomy      MEDICATIONS:  acetaminophen   Tablet .. 650 milliGRAM(s) Oral every 6 hours PRN  ALBUTerol    90 MICROgram(s) HFA Inhaler 2 Puff(s) Inhalation every 6 hours PRN  ALBUTerol   0.5% 2.5 milliGRAM(s) Nebulizer every 6 hours  amLODIPine   Tablet 2.5 milliGRAM(s) Oral daily  aspirin  chewable 81 milliGRAM(s) Oral daily  atorvastatin 40 milliGRAM(s) Oral at bedtime  citalopram 20 milliGRAM(s) Oral daily  clindamycin IVPB 900 milliGRAM(s) IV Intermittent every 8 hours  clindamycin IVPB      docusate sodium 100 milliGRAM(s) Oral three times a day  enoxaparin Injectable 40 milliGRAM(s) SubCutaneous daily  folic acid 1 milliGRAM(s) Oral daily  furosemide   Injectable 40 milliGRAM(s) IV Push daily  gabapentin 100 milliGRAM(s) Oral every 8 hours  lactobacillus acidophilus 1 Tablet(s) Oral three times a day with meals  levothyroxine 125 MICROGram(s) Oral daily  LORazepam     Tablet 1 milliGRAM(s) Oral four times a day PRN  losartan 100 milliGRAM(s) Oral daily  melatonin 3 milliGRAM(s) Oral at bedtime PRN  multivitamin 1 Tablet(s) Oral daily  oxyCODONE    IR 10 milliGRAM(s) Oral every 6 hours PRN  predniSONE   Tablet 5 milliGRAM(s) Oral daily  tiotropium 18 MICROgram(s) Capsule 1 Capsule(s) Inhalation daily      ALLERGIES:  Grapes (Hives)  No Known Drug Allergies  Peaches (Hives)  shellfish (Hives)      VITALS & I/Os:  Vital Signs Last 24 Hrs  T(C): 37 (17 Aug 2019 12:28), Max: 37 (17 Aug 2019 12:28)  T(F): 98.6 (17 Aug 2019 12:28), Max: 98.6 (17 Aug 2019 12:28)  HR: 93 (17 Aug 2019 12:28) (68 - 93)  BP: 128/53 (17 Aug 2019 12:28) (111/50 - 128/53)  BP(mean): --  RR: 16 (17 Aug 2019 12:28) (16 - 20)  SpO2: 99% (17 Aug 2019 12:28) (97% - 99%)    PHYSICAL EXAM:  GEN: NAD, resting quietly  PULM: symmetric chest rise bilaterally, no increased WOB  CV: regular rate  ABD: soft, NTND  EXTR: marked bilat LE edema, 3+ pitting; DP and PT pulses palpable bilaterally      LABS:                        10.8   13.34 )-----------( 203      ( 17 Aug 2019 07:44 )             37.4     08-17    141  |  99  |  18  ----------------------------<  114<H>  4.2   |  32<H>  |  0.69    Ca    10.1      17 Aug 2019 07:44    TPro  7.2  /  Alb  4.0  /  TBili  0.7  /  DBili  x   /  AST  17  /  ALT  14  /  AlkPhos  99  08-17    Lactate:  08-17 @ 07:44  1.8    PT/INR - ( 17 Aug 2019 06:05 )   PT: 12.1 SEC;   INR: 1.09          PTT - ( 17 Aug 2019 06:05 )  PTT:36.5 SEC      IMAGING:  no new imaging

## 2019-08-17 NOTE — H&P ADULT - PROBLEM SELECTOR PLAN 5
continue inhalers    Duoneb as needed   on prednisone 10 mg > changed to 5 mg given edema and stability

## 2019-08-17 NOTE — ED ADULT TRIAGE NOTE - CHIEF COMPLAINT QUOTE
Pt BIBEMS NSLIJ from Home, c/o Bilateral Lower Leg Swelling with Pain worsening, Chest pain, PMHX Emphysema, HTN, HLD, Graves L Breast CA . Denies HA Dizziness SOB Palpitation N V

## 2019-08-17 NOTE — ED PROVIDER NOTE - PHYSICAL EXAMINATION
Gen: NAD  Head: NCAT  HEENT: PERRL, MMM, normal conjunctiva, anicteric, neck supple  Lung: bilateral crackles lower bases  CV: RRR, no murmurs, rubs or gallops  Abd: soft, NTND, no rebound or guarding, no CVAT  MSK: 4+ bilateral leg edema to knees  Neuro: No focal neurologic deficits. CN II-XII grossly intact. 5/5 strength and normal sensation in all extremities.  Skin: Warm and dry, no evidence of rash  Psych: anxious mood and affect

## 2019-08-17 NOTE — ED PROVIDER NOTE - ATTENDING CONTRIBUTION TO CARE
Raymundo 69 y/o F with h/o COPD (on home O2 via NC PRN), breast ca/kidney ca in remission, obesity, NOEMY, s/p laminectomy with paraplegia and bedbound here with worsening leg swelling x 1 day.  Pt reports this morning she noted worsening swelling to both her legs.  (+)occasional CP and SOB.  No fever, chills, rash, cough, abd pain, leg pain.  Obese and chronically ill appearing, lying comfortably in stretcher, awake and alert but easily falling asleep during exam, nontoxic.  AF/VSS.  Lungs cta bl.  Cards nl S1/S2, RRR, no MRG.  Abd soft ntnd.  Bilateral pedal edema, no unilateral swelling, no calf tenderness, no apparent rash.  Plan for ekg, labs, cxr, likely admit.  Eval for metaboilc disturbance vs cardiac etiology.

## 2019-08-17 NOTE — H&P ADULT - ASSESSMENT
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

## 2019-08-17 NOTE — H&P ADULT - NSHPPHYSICALEXAM_GEN_ALL_CORE
Vital Signs Last 24 Hrs  T(C): 37 (08-17-19 @ 12:28)  T(F): 98.6 (08-17-19 @ 12:28), Max: 98.6 (08-17-19 @ 12:28)  HR: 93 (08-17-19 @ 12:28) (68 - 93)  BP: 128/53 (08-17-19 @ 12:28)  RR: 16 (08-17-19 @ 12:28) (16 - 20)  SpO2: 99% (08-17-19 @ 12:28) (97% - 99%)        GEN: A&O X 3 , NAD , comfortable, poor historian and anxious   HEENT: NCAT, PERRL, MMM, hearing intact  Neck: supple , no JVD  CVS: S1S2 , regular , No M/R/G appreciated  PULM: CTA B/L,  no W/R/R appreciated  ABD.: soft. non tender, non distended,  bowel sounds present, obese   Extrem:  ++ significant bilateral LE edema with left side more red and tender to touch   Derm: No rash , no ecchymoses  PSYCH : + anxious

## 2019-08-17 NOTE — H&P ADULT - HISTORY OF PRESENT ILLNESS
Pt is a 67 yo woman, with PMhx of COPD, breast ca/kidney ca in remission, obesity, NOEMY, anxiety, carpal tunnel syndrome, HTN, HLD, S/P laminectomy, presents today with worsening bilateral leg swelling.   Leg swelling has been on/off for months, pt was recently in rehab and went home and was getting treated with weekly ACE wraps but got worse and could not walk..    pt also had some c/o midsternal CP / SOB.   pt denied fever, chills  on further questioning, pt states was only taking haldf of the pill of diuretic ( on her own decision thinking it was drying her too much..   pt otherwise comfortable in ED   meds reviewed and reconciled ( few changes since last discharge noted)

## 2019-08-17 NOTE — CONSULT NOTE ADULT - ASSESSMENT
67 yo woman with a hx of COPD, breast CA, kidney CA in remission, NOEMY, anxiety, HTN, presenting with bilateral LE edema.    - no urgent surgical intervention  - recommend obtaining venous duplex of bilat LE to rule out DVT (previous duplex July 2018 negative for DVT)  - leg elevation, compression stockings  - care per primary team  - surgery will follow    Patient discussed with Dr. Jeanne BANKS Team Surgery  [id number] 67 yo woman with a hx of COPD, breast CA, kidney CA in remission, NOEMY, anxiety, HTN, presenting with bilateral LE edema.    - no urgent surgical intervention  - consider obtaining echocardiogram, cont evaluation for pulmonary HTN  - recommend obtaining venous duplex of bilat LE to rule out DVT (previous duplex July 2018 negative for DVT)  - leg elevation, compression stockings  - care per primary team  - surgery will follow    Patient discussed with Dr. Jeanne BANKS Team Surgery  [id number]

## 2019-08-17 NOTE — ED ADULT NURSE NOTE - OBJECTIVE STATEMENT
Pt received to room 5 a/o x 3 c/o bilateral lower extremity swelling and pain and non radiating midsternal chest pain that started last night. Pt states she has hx of lower extremity swelling but has been managed weekly with compression wraps. pt noted to pitting edema to bilateral lower extremities. Pt is able to ambulatle to wheelchair at baseline but is unable to due to pain and swelling. Pt has hx of COPD and on 2l NC at home. Respirations even and unlabored. slight rhonchi auscultated with equal chest rise bilaterally. ABD is soft, non tender, non distended with normal active bowel sounds. No complaints of  headache, nausea, dizziness, vomiting  SOB, fever, chills verbalized. Pt placed on cardiac monitor in NSR.

## 2019-08-17 NOTE — ED PROVIDER NOTE - OBJECTIVE STATEMENT
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 Was hospitalized in April 2019 for similar complaints of chest pain, admitted for ACS / PE work up. 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 Leg swelling has been on/off for months, was getting treated with weekly ACE wraps but was getting better overall. Overnight, her legs became very swollen to the point where she was unable to walk. Associated with midsternal CP / SOB. Was hospitalized in April 2019 for similar complaints of chest pain, admitted for ACS / PE work up. 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 Leg swelling has been on/off for months, was getting treated with weekly ACE wraps but was getting better overall. Overnight, her legs became very swollen to the point where she was unable to walk. Associated with midsternal CP / SOB. Was hospitalized in April 2019 for similar complaints of chest pain, admitted for ACS / PE work up. Unknown last echo    Meds: amlodipine, aspirin, atorvastatin, citalopram, clonazepam, folic acid, gabapentin, levothyroxine, losartan, melatonin, oxycodone, oxycontin, prednisone, spiriva, torsemide, albuterol nebulizer

## 2019-08-17 NOTE — ED ADULT NURSE NOTE - NSIMPLEMENTINTERV_GEN_ALL_ED
Implemented All Fall with Harm Risk Interventions:  [place] to call system. Call bell, personal items and telephone within reach. Instruct patient to call for assistance. Room bathroom lighting operational. Non-slip footwear when patient is off stretcher. Physically safe environment: no spills, clutter or unnecessary equipment. Stretcher in lowest position, wheels locked, appropriate side rails in place. Provide visual cue, wrist band, yellow gown, etc. Monitor gait and stability. Monitor for mental status changes and reorient to person, place, and time. Review medications for side effects contributing to fall risk. Reinforce activity limits and safety measures with patient and family. Provide visual clues: red socks.

## 2019-08-17 NOTE — ED PROVIDER NOTE - CLINICAL SUMMARY MEDICAL DECISION MAKING FREE TEXT BOX
Leg swelling overnight, acute on chronic. No hx of CHF - Ddx includes new onset CHF, DVT vs cellulitis. no fevers, cellulitis unlikely. bilateral and acute onset in nature, DVT is also unlikely. Will get CXR, labs including pro-bnp and troponin, reassess.

## 2019-08-17 NOTE — H&P ADULT - PROBLEM SELECTOR PLAN 3
~~ High Sensitivity Troponin  ~~  54  <<--, 63  <<--  cardiology consulted and appreciated  recently had cardiac workup   monitor vitals closely  Continue Current medications and monitor.   + f/u cardio recom

## 2019-08-17 NOTE — CONSULT NOTE ADULT - SUBJECTIVE AND OBJECTIVE BOX
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 Leg swelling has been on/off for months, was getting treated with weekly ACE wraps but was getting better overall. Overnight, her legs became very swollen to the point where she was unable to walk. Associated with midsternal CP / SOB. Was hospitalized in 2019 for similar complaints of chest pain, admitted for ACS / PE work up. Unknown last echo    Meds: amlodipine, aspirin, atorvastatin, citalopram, clonazepam, folic acid, gabapentin, levothyroxine, losartan, melatonin, oxycodone, oxycontin, prednisone, spiriva, torsemide, albuterol nebulizer      Allergies    Grapes (Hives)  No Known Drug Allergies  Peaches (Hives)  shellfish (Hives)      PAST MEDICAL & SURGICAL HISTORY:  Asthma  Sleep apnea  Obesity  DVT (deep venous thrombosis): history of- not presently on A/C  Hypothyroid  COPD (chronic obstructive pulmonary disease)  Breast cancer in situ, left  Kidney cancer, primary, with metastasis from kidney to other site, left: LEft sided- s/p nephrectomy only- no chemo or RT  Graves disease  Anxiety  Hyperlipidemia  Hypertension  S/P laminectomy: now bed and wheelchair ridden with severe pain  History of parathyroidectomy  History of carpal tunnel release: right wrist  History of eye surgery: 7 surgeries secondary to grave&#x27;s disease  History of pelvic surgery: Pelvic Sling  S/P breast biopsy: left  S/p nephrectomy: left  S/P cholecystectomy      FAMILY HISTORY:  Family history of thyroid cancer (Child): daughter has thyroid cancer  Family history of heart disease (Sibling): brother has a PPM  Family history of diabetes mellitus (Sibling): siblings  Family history of hypertension: mother and father  Family history of myocardial infarction: mother  at age 67 and father at age 67 of MI&#x27;s      SOCIAL HISTORY:    non smoker. wheelchair bound      REVIEW OF SYSTEMS:  See HPI, otherwise complete 10 point review of systems negative    [ ] All others negative	      PHYSICAL EXAM:  T(C): 36.8 (19 @ 06:49), Max: 36.8 (19 @ 06:49)  HR: 68 (19 @ 07:22) (68 - 72)  BP: 116/51 (19 @ 07:22) (111/50 - 116/51)  RR: 18 (19 @ 07:22) (18 - 20)  SpO2: 97% (19 @ 07:22) (97% - 98%)  Wt(kg): --  I&O's Summary      Appearance: No Acute Distress	  HEENT:  Normal oral mucosa, PERRL, EOMI	  Cardiovascular: Normal S1 S2, No JVD, No murmurs/rubs/gallops  Respiratory: Lungs clear to auscultation bilaterally  Gastrointestinal:  Soft, Non-tender, + BS	  Skin: No rashes, No ecchymoses, No cyanosis	  Neurologic: Non-focal  Extremities: No clubbing, cyanosis. legs 2+ edema, erythematous, no open ullers  Vascular: Peripheral pulses palpable 2+ bilaterally  Psychiatry: A & O x 3, Mood & affect appropriate    Laboratory Data:	 	    CBC Full  -  ( 17 Aug 2019 07:44 )  WBC Count : 13.34 K/uL  Hemoglobin : 10.8 g/dL  Hematocrit : 37.4 %  Platelet Count - Automated : 203 K/uL  Mean Cell Volume : 92.1 fL  Mean Cell Hemoglobin : 26.6 pg  Mean Cell Hemoglobin Concentration : 28.9 %  Auto Neutrophil # : x  Auto Lymphocyte # : x  Auto Monocyte # : x  Auto Eosinophil # : x  Auto Basophil # : x  Auto Neutrophil % : x  Auto Lymphocyte % : x  Auto Monocyte % : x  Auto Eosinophil % : x  Auto Basophil % : x        141  |  99  |  18  ----------------------------<  114<H>  4.2   |  32<H>  |  0.69    Ca    10.1      17 Aug 2019 07:44    TPro  7.2  /  Alb  4.0  /  TBili  0.7  /  DBili  x   /  AST  17  /  ALT  14  /  AlkPhos  99        proBNP: Serum Pro-Brain Natriuretic Peptide: 60.82 pg/mL ( @ 07:44)        Assessment:  -significant LE swelling, with normal BNP, neg cxr and not significantly volume up on exam, suspect venous disease  -atypical cp with elevated hs trop and neg delta  -copd/asthma  -obesity  -noemy  -anxiety    Recs:  -ekg without acute ischemic changes and CE at baseline. would defer ischemic workup at this time as unlikely to tolerate pharmacologic stress testing and any further downstream/invasive procedures if warranted at this time. will consider CTA cors in future if patient agreeable  -c/w asa and statin for elevated ASCVD risk  -give lasix 40mg IV daily. recent TTE  withuot any acute findings. notably amlodipine and prednisone can be contributing to LE edema, will likely transition to alternative antiphypertensive  -obtain venous duplex, please wrap legs in ace bandages and keep elevated  -consult vascular  -elevated wbc, possible LE cellulitis. consider ID consult  -c/w anti hypertensives. bp adequately controlled  - c/w BD for asthma  -PT/OT  -DVT ppx      Greater than 60 minutes spent on total encounter; more than 50% of the visit was spent counseling and/or coordinating care by the attending physician.   	  Juan Salcedo MD   Cardiovascular Diseases  [phone]

## 2019-08-18 DIAGNOSIS — F43.22 ADJUSTMENT DISORDER WITH ANXIETY: ICD-10-CM

## 2019-08-18 LAB
ALBUMIN SERPL ELPH-MCNC: 3.4 G/DL — SIGNIFICANT CHANGE UP (ref 3.3–5)
ALP SERPL-CCNC: 81 U/L — SIGNIFICANT CHANGE UP (ref 40–120)
ALT FLD-CCNC: 12 U/L — SIGNIFICANT CHANGE UP (ref 4–33)
ANION GAP SERPL CALC-SCNC: 9 MMO/L — SIGNIFICANT CHANGE UP (ref 7–14)
AST SERPL-CCNC: 16 U/L — SIGNIFICANT CHANGE UP (ref 4–32)
BASOPHILS # BLD AUTO: 0.03 K/UL — SIGNIFICANT CHANGE UP (ref 0–0.2)
BASOPHILS NFR BLD AUTO: 0.4 % — SIGNIFICANT CHANGE UP (ref 0–2)
BILIRUB SERPL-MCNC: 0.6 MG/DL — SIGNIFICANT CHANGE UP (ref 0.2–1.2)
BUN SERPL-MCNC: 13 MG/DL — SIGNIFICANT CHANGE UP (ref 7–23)
CALCIUM SERPL-MCNC: 10 MG/DL — SIGNIFICANT CHANGE UP (ref 8.4–10.5)
CHLORIDE SERPL-SCNC: 100 MMOL/L — SIGNIFICANT CHANGE UP (ref 98–107)
CHOLEST SERPL-MCNC: 140 MG/DL — SIGNIFICANT CHANGE UP (ref 120–199)
CK SERPL-CCNC: 62 U/L — SIGNIFICANT CHANGE UP (ref 25–170)
CO2 SERPL-SCNC: 32 MMOL/L — HIGH (ref 22–31)
CREAT SERPL-MCNC: 0.61 MG/DL — SIGNIFICANT CHANGE UP (ref 0.5–1.3)
EOSINOPHIL # BLD AUTO: 0.16 K/UL — SIGNIFICANT CHANGE UP (ref 0–0.5)
EOSINOPHIL NFR BLD AUTO: 2.1 % — SIGNIFICANT CHANGE UP (ref 0–6)
GLUCOSE SERPL-MCNC: 94 MG/DL — SIGNIFICANT CHANGE UP (ref 70–99)
HCT VFR BLD CALC: 34.5 % — SIGNIFICANT CHANGE UP (ref 34.5–45)
HDLC SERPL-MCNC: 63 MG/DL — SIGNIFICANT CHANGE UP (ref 45–65)
HGB BLD-MCNC: 9.9 G/DL — LOW (ref 11.5–15.5)
IMM GRANULOCYTES NFR BLD AUTO: 0.4 % — SIGNIFICANT CHANGE UP (ref 0–1.5)
LACTATE SERPL-SCNC: 0.6 MMOL/L — SIGNIFICANT CHANGE UP (ref 0.5–2)
LIPID PNL WITH DIRECT LDL SERPL: 64 MG/DL — SIGNIFICANT CHANGE UP
LYMPHOCYTES # BLD AUTO: 1.83 K/UL — SIGNIFICANT CHANGE UP (ref 1–3.3)
LYMPHOCYTES # BLD AUTO: 23.6 % — SIGNIFICANT CHANGE UP (ref 13–44)
MAGNESIUM SERPL-MCNC: 2 MG/DL — SIGNIFICANT CHANGE UP (ref 1.6–2.6)
MCHC RBC-ENTMCNC: 26.3 PG — LOW (ref 27–34)
MCHC RBC-ENTMCNC: 28.7 % — LOW (ref 32–36)
MCV RBC AUTO: 91.5 FL — SIGNIFICANT CHANGE UP (ref 80–100)
MONOCYTES # BLD AUTO: 0.76 K/UL — SIGNIFICANT CHANGE UP (ref 0–0.9)
MONOCYTES NFR BLD AUTO: 9.8 % — SIGNIFICANT CHANGE UP (ref 2–14)
NEUTROPHILS # BLD AUTO: 4.96 K/UL — SIGNIFICANT CHANGE UP (ref 1.8–7.4)
NEUTROPHILS NFR BLD AUTO: 63.7 % — SIGNIFICANT CHANGE UP (ref 43–77)
NRBC # FLD: 0 K/UL — SIGNIFICANT CHANGE UP (ref 0–0)
PHOSPHATE SERPL-MCNC: 3.9 MG/DL — SIGNIFICANT CHANGE UP (ref 2.5–4.5)
PLATELET # BLD AUTO: 162 K/UL — SIGNIFICANT CHANGE UP (ref 150–400)
PMV BLD: 10.9 FL — SIGNIFICANT CHANGE UP (ref 7–13)
POTASSIUM SERPL-MCNC: 3.5 MMOL/L — SIGNIFICANT CHANGE UP (ref 3.5–5.3)
POTASSIUM SERPL-SCNC: 3.5 MMOL/L — SIGNIFICANT CHANGE UP (ref 3.5–5.3)
PROT SERPL-MCNC: 6.2 G/DL — SIGNIFICANT CHANGE UP (ref 6–8.3)
RBC # BLD: 3.77 M/UL — LOW (ref 3.8–5.2)
RBC # FLD: 15.6 % — HIGH (ref 10.3–14.5)
SODIUM SERPL-SCNC: 141 MMOL/L — SIGNIFICANT CHANGE UP (ref 135–145)
TRIGL SERPL-MCNC: 85 MG/DL — SIGNIFICANT CHANGE UP (ref 10–149)
TROPONIN T, HIGH SENSITIVITY: 64 NG/L — CRITICAL HIGH (ref ?–14)
TSH SERPL-MCNC: 2.15 UIU/ML — SIGNIFICANT CHANGE UP (ref 0.27–4.2)
WBC # BLD: 7.77 K/UL — SIGNIFICANT CHANGE UP (ref 3.8–10.5)
WBC # FLD AUTO: 7.77 K/UL — SIGNIFICANT CHANGE UP (ref 3.8–10.5)

## 2019-08-18 PROCEDURE — 99232 SBSQ HOSP IP/OBS MODERATE 35: CPT

## 2019-08-18 PROCEDURE — 93010 ELECTROCARDIOGRAM REPORT: CPT

## 2019-08-18 RX ORDER — IPRATROPIUM/ALBUTEROL SULFATE 18-103MCG
3 AEROSOL WITH ADAPTER (GRAM) INHALATION ONCE
Refills: 0 | Status: COMPLETED | OUTPATIENT
Start: 2019-08-18 | End: 2019-08-18

## 2019-08-18 RX ORDER — OXYCODONE HYDROCHLORIDE 5 MG/1
5 TABLET ORAL ONCE
Refills: 0 | Status: DISCONTINUED | OUTPATIENT
Start: 2019-08-18 | End: 2019-08-18

## 2019-08-18 RX ORDER — TRIHEXYPHENIDYL HCL 2 MG
2 TABLET ORAL DAILY
Refills: 0 | Status: DISCONTINUED | OUTPATIENT
Start: 2019-08-18 | End: 2019-08-20

## 2019-08-18 RX ORDER — ALBUTEROL 90 UG/1
2 AEROSOL, METERED ORAL EVERY 4 HOURS
Refills: 0 | Status: DISCONTINUED | OUTPATIENT
Start: 2019-08-18 | End: 2019-08-20

## 2019-08-18 RX ADMIN — Medication 40 MILLIGRAM(S): at 06:28

## 2019-08-18 RX ADMIN — CITALOPRAM 20 MILLIGRAM(S): 10 TABLET, FILM COATED ORAL at 11:10

## 2019-08-18 RX ADMIN — OXYCODONE HYDROCHLORIDE 10 MILLIGRAM(S): 5 TABLET ORAL at 09:19

## 2019-08-18 RX ADMIN — ALBUTEROL 2.5 MILLIGRAM(S): 90 AEROSOL, METERED ORAL at 03:19

## 2019-08-18 RX ADMIN — ALBUTEROL 2.5 MILLIGRAM(S): 90 AEROSOL, METERED ORAL at 15:44

## 2019-08-18 RX ADMIN — GABAPENTIN 100 MILLIGRAM(S): 400 CAPSULE ORAL at 22:08

## 2019-08-18 RX ADMIN — Medication 2 MILLIGRAM(S): at 15:35

## 2019-08-18 RX ADMIN — ENOXAPARIN SODIUM 40 MILLIGRAM(S): 100 INJECTION SUBCUTANEOUS at 11:10

## 2019-08-18 RX ADMIN — Medication 81 MILLIGRAM(S): at 11:10

## 2019-08-18 RX ADMIN — OXYCODONE HYDROCHLORIDE 10 MILLIGRAM(S): 5 TABLET ORAL at 16:28

## 2019-08-18 RX ADMIN — GABAPENTIN 100 MILLIGRAM(S): 400 CAPSULE ORAL at 13:26

## 2019-08-18 RX ADMIN — OXYCODONE HYDROCHLORIDE 10 MILLIGRAM(S): 5 TABLET ORAL at 02:45

## 2019-08-18 RX ADMIN — Medication 3 MILLILITER(S): at 20:13

## 2019-08-18 RX ADMIN — LOSARTAN POTASSIUM 100 MILLIGRAM(S): 100 TABLET, FILM COATED ORAL at 06:28

## 2019-08-18 RX ADMIN — ALBUTEROL 2 PUFF(S): 90 AEROSOL, METERED ORAL at 11:47

## 2019-08-18 RX ADMIN — Medication 3 MILLILITER(S): at 08:23

## 2019-08-18 RX ADMIN — ALBUTEROL 2 PUFF(S): 90 AEROSOL, METERED ORAL at 20:53

## 2019-08-18 RX ADMIN — AMLODIPINE BESYLATE 2.5 MILLIGRAM(S): 2.5 TABLET ORAL at 06:28

## 2019-08-18 RX ADMIN — Medication 1 MILLIGRAM(S): at 07:23

## 2019-08-18 RX ADMIN — Medication 1 MILLIGRAM(S): at 13:26

## 2019-08-18 RX ADMIN — ALBUTEROL 2.5 MILLIGRAM(S): 90 AEROSOL, METERED ORAL at 09:45

## 2019-08-18 RX ADMIN — Medication 100 MILLIGRAM(S): at 06:28

## 2019-08-18 RX ADMIN — Medication 1 MILLIGRAM(S): at 19:43

## 2019-08-18 RX ADMIN — ALBUTEROL 2.5 MILLIGRAM(S): 90 AEROSOL, METERED ORAL at 22:13

## 2019-08-18 RX ADMIN — OXYCODONE HYDROCHLORIDE 10 MILLIGRAM(S): 5 TABLET ORAL at 03:05

## 2019-08-18 RX ADMIN — Medication 1 TABLET(S): at 11:10

## 2019-08-18 RX ADMIN — Medication 0.5 MILLIGRAM(S): at 11:08

## 2019-08-18 RX ADMIN — OXYCODONE HYDROCHLORIDE 10 MILLIGRAM(S): 5 TABLET ORAL at 10:19

## 2019-08-18 RX ADMIN — TIOTROPIUM BROMIDE 1 CAPSULE(S): 18 CAPSULE ORAL; RESPIRATORY (INHALATION) at 10:12

## 2019-08-18 RX ADMIN — Medication 3 MILLIGRAM(S): at 22:07

## 2019-08-18 RX ADMIN — Medication 1 MILLIGRAM(S): at 11:10

## 2019-08-18 RX ADMIN — GABAPENTIN 100 MILLIGRAM(S): 400 CAPSULE ORAL at 06:28

## 2019-08-18 RX ADMIN — Medication 125 MICROGRAM(S): at 06:28

## 2019-08-18 RX ADMIN — ATORVASTATIN CALCIUM 40 MILLIGRAM(S): 80 TABLET, FILM COATED ORAL at 22:08

## 2019-08-18 RX ADMIN — Medication 5 MILLIGRAM(S): at 06:29

## 2019-08-18 NOTE — BEHAVIORAL HEALTH ASSESSMENT NOTE - SUMMARY
68F with extensive PMHx, PPHx anxiety not in outpatient treatment, admitted for cellulitis and LE edema. [facility] Psych consulted for capacity to leave AMA and anxiety. Patient states she has thoughts of leaving AMA because she is afraid she will die in the hospital if she does not receive her nebulizer treatments in a timely manner; unable to reasonably manipulate information about her condition or treatment in a manner that is likely secondary to anxiety. Also cannot fully describe risks of leaving AMA. As such, she does not have capacity to leave AMA. Anxiety likely exacerbated by physical symptoms (SOB, anemia).    PLAN:  - No capacity to leave AMA.  - Continue to address medical symptoms exacerbating anxiety.  - Continue with Celexa 20mg PO qD, follow EKG to ensure QTc < 500ms.  - Give Ativan 1mg PO q6h PRN for anxiety/agitation.  - [facility] Psych to follow.

## 2019-08-18 NOTE — BEHAVIORAL HEALTH ASSESSMENT NOTE - NSBHCHARTREVIEWLAB_PSY_A_CORE FT
9.9    7.77  )-----------( 162      ( 18 Aug 2019 06:23 )             34.5   08-18    141  |  100  |  13  ----------------------------<  94  3.5   |  32<H>  |  0.61  08-17    141  |  99  |  18  ----------------------------<  114<H>  4.2   |  32<H>  |  0.69    Ca   10.0   18 Aug 2019 06:23 /  , Ca   10.1   17 Aug 2019 07:44 /    Phos  3.9  08-18 /  Mg   2.0   08-18 /  TPro  6.2  /  Alb  3.4  /  TBili  0.6  /  DBili  x   /  AST  16  /  ALT  12  /  AlkPhos  81  08-18  TPro  7.2  /  Alb  4.0  /  TBili  0.7  /  DBili  x   /  AST  17  /  ALT  14  /  AlkPhos  99  08-17

## 2019-08-18 NOTE — BEHAVIORAL HEALTH ASSESSMENT NOTE - MODIFICATIONS
I have evaluated the patient with Dr PRESTON Reyes and performed gutiérrez elements of the History and Mental Status Examination.  I agree with her assessment and recommendations.

## 2019-08-18 NOTE — BEHAVIORAL HEALTH ASSESSMENT NOTE - RISK ASSESSMENT
Patient is at elevated risk of harm due to severe anxiety. Denies SHIIP. Requires further psychiatric assessment.

## 2019-08-18 NOTE — BEHAVIORAL HEALTH ASSESSMENT NOTE - HPI (INCLUDE ILLNESS QUALITY, SEVERITY, DURATION, TIMING, CONTEXT, MODIFYING FACTORS, ASSOCIATED SIGNS AND SYMPTOMS)
Patient is a 68 year-old woman domiciled at home with her  (but recently in physical rehab), wheel chair bound, PMHx of COPD, breast ca/kidney ca in remission, obesity, NOEMY, anxiety, carpal tunnel syndrome, HTN, HLD, s/p laminectomy, PPHx anxiety not in outpatient psychiatric treatment but with Celexa 20mg PO qD prescribed by PCP, admitted for worsening bilateral leg swelling and cellulitis. [facility] Psych consulted as patient stating she would leave AMA.    Patient seen and examined. Notably anxious-appearing. States that she is very worried that she will die from "asthma" as she has had two family members under the age of 60 die from the disease. She states that she usually feels better when she uses her albuterol at home, and that she has been getting anxious when she is told she has to wait for a treatment in the hospital. The patient states she has thoughts of leaving AMA because she fears she will die in the hospital from not getting albuterol fast enough; she is unable to fully describe the risks of leaving and unable to rationally manipulate information about the reasons she is currently in the hospital. As such, she has no capacity to leave AMA.    Patient denies pervasive depressed mood, AH/VH, paranoid ideation, and SHIIP. Reports some insomnia but states "I have always been a poor sleeper." States she is only anxious because of her shortness of breath and denies that she has an underlying mood disorder. Expresses surprise that Celexa is a psychiatric medication. States she has not been in psychiatric care since her psychiatrist retired. Denies past psychiatric hospitalizations. Denies current tobacco smoking, ETOH use, and illicit drug use.

## 2019-08-18 NOTE — CONSULT NOTE ADULT - SUBJECTIVE AND OBJECTIVE BOX
HPI:   Patient is a 68y female with a history of paraplegia secondary to spinal cord injury, obesity, asthma, NOEMY, breast and renal cell cancer, DVT, who was admited to ER  with lower extremity edema and dyspnea/wheezes.She has B/L lower extremity edema, was initially covered for cellulitis with Clindamycin.No history of fever, chills.she is doing better with compressive wraps.Today she is anxious and c/o wheezes and chest pressure.    REVIEW OF SYSTEMS:  All other review of systems negative (Comprehensive ROS)    PAST MEDICAL & SURGICAL HISTORY:  Asthma  Sleep apnea  Obesity  DVT (deep venous thrombosis): history of- not presently on A/C  Hypothyroid  COPD (chronic obstructive pulmonary disease)  Breast cancer in situ, left  Kidney cancer, primary, with metastasis from kidney to other site, left: LEft sided- s/p nephrectomy only- no chemo or RT  Graves disease  Anxiety  Hyperlipidemia  Hypertension  S/P laminectomy: now bed and wheelchair ridden with severe pain  History of parathyroidectomy  History of carpal tunnel release: right wrist  History of eye surgery: 7 surgeries secondary to grave&#x27;s disease  History of pelvic surgery: Pelvic Sling  S/P breast biopsy: left  S/p nephrectomy: left  S/P cholecystectomy      Allergies    Grapes (Hives)  No Known Drug Allergies  Peaches (Hives)  shellfish (Hives)    Intolerances        Antimicrobials Day #  :day 2  clindamycin IVPB 900 milliGRAM(s) IV Intermittent every 8 hours  clindamycin IVPB        Other Medications:  acetaminophen   Tablet .. 650 milliGRAM(s) Oral every 6 hours PRN  ALBUTerol    90 MICROgram(s) HFA Inhaler 2 Puff(s) Inhalation every 6 hours PRN  ALBUTerol   0.5% 2.5 milliGRAM(s) Nebulizer every 6 hours  amLODIPine   Tablet 2.5 milliGRAM(s) Oral daily  aspirin  chewable 81 milliGRAM(s) Oral daily  atorvastatin 40 milliGRAM(s) Oral at bedtime  citalopram 20 milliGRAM(s) Oral daily  docusate sodium 100 milliGRAM(s) Oral three times a day  enoxaparin Injectable 40 milliGRAM(s) SubCutaneous daily  folic acid 1 milliGRAM(s) Oral daily  furosemide   Injectable 40 milliGRAM(s) IV Push daily  gabapentin 100 milliGRAM(s) Oral every 8 hours  lactobacillus acidophilus 1 Tablet(s) Oral three times a day with meals  levothyroxine 125 MICROGram(s) Oral daily  LORazepam     Tablet 1 milliGRAM(s) Oral four times a day PRN  losartan 100 milliGRAM(s) Oral daily  melatonin 3 milliGRAM(s) Oral at bedtime PRN  multivitamin 1 Tablet(s) Oral daily  oxyCODONE    IR 10 milliGRAM(s) Oral every 6 hours PRN  predniSONE   Tablet 5 milliGRAM(s) Oral daily  tiotropium 18 MICROgram(s) Capsule 1 Capsule(s) Inhalation daily      FAMILY HISTORY:  Family history of thyroid cancer (Child): daughter has thyroid cancer  Family history of heart disease (Sibling): brother has a PPM  Family history of diabetes mellitus (Sibling): siblings  Family history of hypertension: mother and father  Family history of myocardial infarction: mother  at age 67 and father at age 67 of MI&#x27;s      SOCIAL HISTORY:  Smoking:  no   ETOH:  no   Drug Use:no        T(F): 97.7 (19 @ 06:18), Max: 98.9 (19 @ 16:37)  HR: 85 (19 @ 09:45)  BP: 122/62 (19 @ 06:18)  RR: 20 (19 @ 06:18)  SpO2: 96% (19 @ 09:45)  Wt(kg): --    PHYSICAL EXAM:  General: alert, no acute distress, very anxious  Eyes:  anicteric, no conjunctival injection, no discharge  Oropharynx: no lesions or injection 	  Neck: supple, without adenopathy  Lungs: B/L wheezes  Heart: regular rate and rhythm; no murmur, rubs or gallops  Abdomen: soft, nondistended, nontender, without mass or organomegaly  Skin: healed wounds on both legs  Extremities: no clubbing, cyanosis, + edema, venous stasis changes areas of skin discoloration which are chronic  Neurologic: alert, oriented, paraplegia    LAB RESULTS:                        9.9    7.77  )-----------( 162      ( 18 Aug 2019 06:23 )             34.5     08-18    141  |  100  |  13  ----------------------------<  94  3.5   |  32<H>  |  0.61    Ca    10.0      18 Aug 2019 06:23  Phos  3.9     08-18  Mg     2.0     -18    TPro  6.2  /  Alb  3.4  /  TBili  0.6  /  DBili  x   /  AST  16  /  ALT  12  /  AlkPhos  81  08-18    LIVER FUNCTIONS - ( 18 Aug 2019 06:23 )  Alb: 3.4 g/dL / Pro: 6.2 g/dL / ALK PHOS: 81 u/L / ALT: 12 u/L / AST: 16 u/L / GGT: x           Urinalysis Basic - ( 17 Aug 2019 16:28 )    Color: LIGHT YELLOW / Appearance: CLEAR / S.008 / pH: 7.0  Gluc: NEGATIVE / Ketone: NEGATIVE  / Bili: NEGATIVE / Urobili: NORMAL   Blood: NEGATIVE / Protein: NEGATIVE / Nitrite: NEGATIVE   Leuk Esterase: LARGE / RBC: 0-2 / WBC 11-25   Sq Epi: OCC / Non Sq Epi: x / Bacteria: MODERATE        MICROBIOLOGY:  RECENT CULTURES:        RADIOLOGY REVIEWED:  < from: Xray Chest 1 View AP/PA (19 @ 07:30) >  IMPRESSION:   Clear lungs.    < end of copied text >

## 2019-08-18 NOTE — PROGRESS NOTE ADULT - SUBJECTIVE AND OBJECTIVE BOX
Consulting surgical team: C Team [id number]  Consulting attending: Dr. Angel    HPI:  67 yo woman with a hx of COPD, breast CA, kidney CA in remission, NOEMY, anxiety, HTN, presenting for evaluation of worsening bilateral leg swelling and chest pain for which she is undergoing a cardiac evaluation. She reports chronic leg swelling present for several years, improved by compression and elevation. She is able to ambulate approx 100 feet. She denies drainage or wounds. She reports she is unable to lie flat and sleeps propped up on pillows. She is on torsemide at home.      PAST MEDICAL HISTORY:  Asthma  Sleep apnea  Obesity  DVT (deep venous thrombosis)  Hypothyroid  COPD (chronic obstructive pulmonary disease)  Breast cancer in situ, left  Kidney cancer, primary, with metastasis from kidney to other site, left  Graves disease  Anxiety  Hyperlipidemia  Hypertension      PAST SURGICAL HISTORY:  S/P laminectomy  History of parathyroidectomy  History of carpal tunnel release  History of eye surgery  History of pelvic surgery  S/P breast biopsy  S/p nephrectomy  S/P cholecystectomy      MEDICATIONS:  acetaminophen   Tablet .. 650 milliGRAM(s) Oral every 6 hours PRN  ALBUTerol    90 MICROgram(s) HFA Inhaler 2 Puff(s) Inhalation every 6 hours PRN  ALBUTerol   0.5% 2.5 milliGRAM(s) Nebulizer every 6 hours  amLODIPine   Tablet 2.5 milliGRAM(s) Oral daily  aspirin  chewable 81 milliGRAM(s) Oral daily  atorvastatin 40 milliGRAM(s) Oral at bedtime  citalopram 20 milliGRAM(s) Oral daily  clindamycin IVPB 900 milliGRAM(s) IV Intermittent every 8 hours  clindamycin IVPB      docusate sodium 100 milliGRAM(s) Oral three times a day  enoxaparin Injectable 40 milliGRAM(s) SubCutaneous daily  folic acid 1 milliGRAM(s) Oral daily  furosemide   Injectable 40 milliGRAM(s) IV Push daily  gabapentin 100 milliGRAM(s) Oral every 8 hours  lactobacillus acidophilus 1 Tablet(s) Oral three times a day with meals  levothyroxine 125 MICROGram(s) Oral daily  LORazepam     Tablet 1 milliGRAM(s) Oral four times a day PRN  losartan 100 milliGRAM(s) Oral daily  melatonin 3 milliGRAM(s) Oral at bedtime PRN  multivitamin 1 Tablet(s) Oral daily  oxyCODONE    IR 10 milliGRAM(s) Oral every 6 hours PRN  predniSONE   Tablet 5 milliGRAM(s) Oral daily  tiotropium 18 MICROgram(s) Capsule 1 Capsule(s) Inhalation daily      ALLERGIES:  Grapes (Hives)  No Known Drug Allergies  Peaches (Hives)  shellfish (Hives)      VITALS & I/Os:  Vital Signs Last 24 Hrs  T(C): 37 (17 Aug 2019 12:28), Max: 37 (17 Aug 2019 12:28)  T(F): 98.6 (17 Aug 2019 12:28), Max: 98.6 (17 Aug 2019 12:28)  HR: 93 (17 Aug 2019 12:28) (68 - 93)  BP: 128/53 (17 Aug 2019 12:28) (111/50 - 128/53)  BP(mean): --  RR: 16 (17 Aug 2019 12:28) (16 - 20)  SpO2: 99% (17 Aug 2019 12:28) (97% - 99%)    PHYSICAL EXAM:  GEN: NAD, resting quietly  PULM: symmetric chest rise bilaterally, no increased WOB  CV: regular rate  ABD: soft, NTND  EXTR: marked bilat LE edema, 3+ pitting; DP and PT pulses palpable bilaterally      LABS:                        10.8   13.34 )-----------( 203      ( 17 Aug 2019 07:44 )             37.4     08-17    141  |  99  |  18  ----------------------------<  114<H>  4.2   |  32<H>  |  0.69    Ca    10.1      17 Aug 2019 07:44    TPro  7.2  /  Alb  4.0  /  TBili  0.7  /  DBili  x   /  AST  17  /  ALT  14  /  AlkPhos  99  08-17    Lactate:  08-17 @ 07:44  1.8    PT/INR - ( 17 Aug 2019 06:05 )   PT: 12.1 SEC;   INR: 1.09          PTT - ( 17 Aug 2019 06:05 )  PTT:36.5 SEC      IMAGING:  no new imaging Consulting surgical team: DARREN Team [id number]      HPI:  67 yo woman with a hx of COPD, breast CA, kidney CA in remission, NOEMY, anxiety, HTN, presenting for evaluation of worsening bilateral leg swelling and chest pain for which she is undergoing a cardiac evaluation. She reports chronic leg swelling present for several years, improved by compression and elevation. She is able to ambulate approx 100 feet. She denies drainage or wounds. She reports she is unable to lie flat and sleeps propped up on pillows. She is on torsemide at home.    Pt. Seen and examined at the bedside this AM. She has c/o of needing her nebulizer, which was on the way when the team saw her. No acute respiratory distress or acute o/n events. The pt. is being followed by vascular re: b/l swelling of the b/l LEs.      Objective:  Vital Signs Last 24 Hrs  T(C): 36.5 (18 Aug 2019 06:18), Max: 37.2 (17 Aug 2019 16:37)  T(F): 97.7 (18 Aug 2019 06:18), Max: 98.9 (17 Aug 2019 16:37)  HR: 85 (18 Aug 2019 09:45) (69 - 93)  BP: 122/62 (18 Aug 2019 06:18) (110/53 - 141/72)  BP(mean): --  RR: 20 (18 Aug 2019 06:18) (16 - 20)  SpO2: 96% (18 Aug 2019 09:45) (96% - 100%)    I&O's Detail      MEDICATIONS  (STANDING):  ALBUTerol   0.5% 2.5 milliGRAM(s) Nebulizer every 6 hours  amLODIPine   Tablet 2.5 milliGRAM(s) Oral daily  aspirin  chewable 81 milliGRAM(s) Oral daily  atorvastatin 40 milliGRAM(s) Oral at bedtime  citalopram 20 milliGRAM(s) Oral daily  clindamycin IVPB 900 milliGRAM(s) IV Intermittent every 8 hours  clindamycin IVPB      docusate sodium 100 milliGRAM(s) Oral three times a day  enoxaparin Injectable 40 milliGRAM(s) SubCutaneous daily  folic acid 1 milliGRAM(s) Oral daily  furosemide   Injectable 40 milliGRAM(s) IV Push daily  gabapentin 100 milliGRAM(s) Oral every 8 hours  lactobacillus acidophilus 1 Tablet(s) Oral three times a day with meals  levothyroxine 125 MICROGram(s) Oral daily  losartan 100 milliGRAM(s) Oral daily  multivitamin 1 Tablet(s) Oral daily  predniSONE   Tablet 5 milliGRAM(s) Oral daily  tiotropium 18 MICROgram(s) Capsule 1 Capsule(s) Inhalation daily    MEDICATIONS  (PRN):  acetaminophen   Tablet .. 650 milliGRAM(s) Oral every 6 hours PRN Temp greater or equal to 38C (100.4F), Mild Pain (1 - 3)  ALBUTerol    90 MICROgram(s) HFA Inhaler 2 Puff(s) Inhalation every 6 hours PRN Shortness of Breath and/or Wheezing  LORazepam     Tablet 1 milliGRAM(s) Oral four times a day PRN Anxiety  melatonin 3 milliGRAM(s) Oral at bedtime PRN Insomnia  oxyCODONE    IR 10 milliGRAM(s) Oral every 6 hours PRN Moderate Pain (4 - 6)                            9.9    7.77  )-----------( 162      ( 18 Aug 2019 06:23 )             34.5       08-18    141  |  100  |  13  ----------------------------<  94  3.5   |  32<H>  |  0.61    Ca    10.0      18 Aug 2019 06:23  Phos  3.9     08-18  Mg     2.0     08-18    TPro  6.2  /  Alb  3.4  /  TBili  0.6  /  DBili  x   /  AST  16  /  ALT  12  /  AlkPhos  81  08-18    PHYSICAL EXAM:  GEN: NAD, resting quietly  PULM: symmetric chest rise bilaterally, no increased WOB  CV: regular rate  ABD: soft, NTND  EXTR: marked bilat LE edema, 3+ pitting; DP and PT pulses palpable bilaterally Consulting surgical team: DARREN Team [id number]      HPI:  69 yo woman with a hx of COPD, breast CA, kidney CA in remission, NOEMY, anxiety, HTN, presenting for evaluation of worsening bilateral leg swelling and chest pain for which she is undergoing a cardiac evaluation. She reports chronic leg swelling present for several years, improved by compression and elevation. She is able to ambulate approx 100 feet. She denies drainage or wounds. She reports she is unable to lie flat and sleeps propped up on pillows. She is on torsemide at home.    Pt. Seen and examined at the bedside this AM. She has c/o of needing her nebulizer, which was on the way when the team saw her. No acute respiratory distress or acute o/n events. The pt. is being followed by vascular re: b/l swelling of the b/l LEs.      Objective:  Vital Signs Last 24 Hrs  T(C): 36.5 (18 Aug 2019 06:18), Max: 37.2 (17 Aug 2019 16:37)  T(F): 97.7 (18 Aug 2019 06:18), Max: 98.9 (17 Aug 2019 16:37)  HR: 85 (18 Aug 2019 09:45) (69 - 93)  BP: 122/62 (18 Aug 2019 06:18) (110/53 - 141/72)  BP(mean): --  RR: 20 (18 Aug 2019 06:18) (16 - 20)  SpO2: 96% (18 Aug 2019 09:45) (96% - 100%)    I&O's Detail      MEDICATIONS  (STANDING):  ALBUTerol   0.5% 2.5 milliGRAM(s) Nebulizer every 6 hours  amLODIPine   Tablet 2.5 milliGRAM(s) Oral daily  aspirin  chewable 81 milliGRAM(s) Oral daily  atorvastatin 40 milliGRAM(s) Oral at bedtime  citalopram 20 milliGRAM(s) Oral daily  clindamycin IVPB 900 milliGRAM(s) IV Intermittent every 8 hours  clindamycin IVPB      docusate sodium 100 milliGRAM(s) Oral three times a day  enoxaparin Injectable 40 milliGRAM(s) SubCutaneous daily  folic acid 1 milliGRAM(s) Oral daily  furosemide   Injectable 40 milliGRAM(s) IV Push daily  gabapentin 100 milliGRAM(s) Oral every 8 hours  lactobacillus acidophilus 1 Tablet(s) Oral three times a day with meals  levothyroxine 125 MICROGram(s) Oral daily  losartan 100 milliGRAM(s) Oral daily  multivitamin 1 Tablet(s) Oral daily  predniSONE   Tablet 5 milliGRAM(s) Oral daily  tiotropium 18 MICROgram(s) Capsule 1 Capsule(s) Inhalation daily    MEDICATIONS  (PRN):  acetaminophen   Tablet .. 650 milliGRAM(s) Oral every 6 hours PRN Temp greater or equal to 38C (100.4F), Mild Pain (1 - 3)  ALBUTerol    90 MICROgram(s) HFA Inhaler 2 Puff(s) Inhalation every 6 hours PRN Shortness of Breath and/or Wheezing  LORazepam     Tablet 1 milliGRAM(s) Oral four times a day PRN Anxiety  melatonin 3 milliGRAM(s) Oral at bedtime PRN Insomnia  oxyCODONE    IR 10 milliGRAM(s) Oral every 6 hours PRN Moderate Pain (4 - 6)                            9.9    7.77  )-----------( 162      ( 18 Aug 2019 06:23 )             34.5       08-18    141  |  100  |  13  ----------------------------<  94  3.5   |  32<H>  |  0.61    Ca    10.0      18 Aug 2019 06:23  Phos  3.9     08-18  Mg     2.0     08-18    TPro  6.2  /  Alb  3.4  /  TBili  0.6  /  DBili  x   /  AST  16  /  ALT  12  /  AlkPhos  81  08-18    PHYSICAL EXAM:  GEN: NAD, resting quietly  PULM: symmetric chest rise bilaterally, no increased WOB  CV: regular rate  ABD: soft, NTND  EXTR: marked bilat LE edema, 3+ pitting; DP and PT pulses palpable bilaterally  ID slight dec in cellulitis  dorian calf and thigh medially     Dorian LE Venous Duplex neg for dvt

## 2019-08-18 NOTE — BEHAVIORAL HEALTH ASSESSMENT NOTE - CASE SUMMARY
68/F with unclear hx of anxiety, has been out of psychiatric treatment but been prescribed SSRI c/o  her PCP.  Currently admitted for management of  worsening bilateral leg swelling and cellulitis.  Psychiatrically was consulted for AMA.  Though she is able to communicate symptoms of worsening anxiety and is afraid that she might die in the hospital if she does not receive treatment, the Pt is unable to grasp the fundamental meaning of information communicated to her by the treatment team pertaining to current treatment approach, unable to appreciate current clinical situation and its consequences; i.e. about her condition or treatment in a manner that is likely secondary to anxiety and is unable to engage in a rational process of manipulating relevant information discussed to her.  Hence, Pt does not have the capacity to leave AMA.

## 2019-08-18 NOTE — PROGRESS NOTE ADULT - ASSESSMENT
69 yo woman with a hx of COPD, breast CA, kidney CA in remission, NOEMY, anxiety, HTN, presenting with bilateral LE edema.    - no urgent surgical intervention  - consider obtaining echocardiogram, cont evaluation for pulmonary HTN  - recommend obtaining venous duplex of bilat LE to rule out DVT (previous duplex July 2018 negative for DVT)  - leg elevation, compression stockings  - care per primary team  - surgery will follow    Patient discussed with Dr. Jeanne BANKS Team Surgery  [id number] 67 yo woman with a hx of COPD, breast CA, kidney CA in remission, NOEMY, anxiety, HTN, presenting with bilateral LE edema.    - no urgent surgical intervention  - Venous duplex negative for DVT or other acute process requiring surgical intervention  - care per primary team  - recommend compression stockings on b/l LEs and elevation  - surgery will sign-off at this time, no acute need for outpatient follow-up    Patient discussed with Dr. Jeanne BANKS Team Surgery  [id number] 69 yo woman with a hx of COPD, breast CA, kidney CA in remission, NOEMY, anxiety, HTN, presenting with bilateral LE edema.    - continue leg elevation and  ace wraps  continue iv abx  will follow

## 2019-08-18 NOTE — BEHAVIORAL HEALTH ASSESSMENT NOTE - NSBHCHARTREVIEWVS_PSY_A_CORE FT
ICU Vital Signs Last 24 Hrs  T(C): 36.4 (18 Aug 2019 13:33), Max: 36.6 (17 Aug 2019 20:33)  T(F): 97.6 (18 Aug 2019 13:33), Max: 97.9 (17 Aug 2019 20:33)  HR: 85 (18 Aug 2019 15:45) (72 - 94)  BP: 142/78 (18 Aug 2019 13:33) (110/53 - 142/78)  BP(mean): --  ABP: --  ABP(mean): --  RR: 19 (18 Aug 2019 13:33) (19 - 20)  SpO2: 99% (18 Aug 2019 13:33) (96% - 100%)

## 2019-08-18 NOTE — CONSULT NOTE ADULT - ASSESSMENT
67 yo female with multiple medical problems admitted with leg edema and concerns of lower extremity cellulitis.  I suspect erythema is likely from edema and venous stasis.Her legs do not look cellulitic today, she is afebrile and has a normal wbc and diff.  I think it will be safe to stop antibiotics and observe.  Suggest:  1.stop clinda  2.Leg elevation and compressions, vascular f/u

## 2019-08-18 NOTE — PROGRESS NOTE ADULT - ASSESSMENT
_________________________________________________________________________________________  ========>>  M E D I C A L   A T T E N D I N G    F O L L O W  U P  N O T E  <<=========  -----------------------------------------------------------------------------------------------------    - Patient seen and examined by me earlier today.   - In summary,  SHKEHAR VASQUEZ is a 68y year old woman who originally presented with leg edema   - Patient today overall doing ok, comfortable, eating OK.  fels anxious and asking for ativan and oxy to be ordered as around the clock !     ==================>> REVIEW OF SYSTEM <<=================    GEN: no fever, no chills, + chronic generalized and back pain  RESP: no SOB, no cough, no sputum  CVS: no chest pain, no palpitations, + stable edema and pain in Rt leg   GI: no abdominal pain, no nausea, no constipation, no diarrhea  : no dysuria, no frequency, no hematuria  Neuro: no headache, no dizziness  Derm : no itching, no rash    ==================>> PHYSICAL EXAM <<=================    GEN: A&O X 2 , NAD , comfortable but appears very anxious , forgetful   HEENT: NCAT, PERRL, MMM, hearing intact  Neck: supple , no JVD  CVS: S1S2 , regular , No M/R/G appreciated  PULM: CTA B/L,  no W/R/R appreciated  ABD.: soft. non tender, non distended,  bowel sounds present  Extrem: intact pulses , + B/L LE edema  and Lt sided erythema and tenderness, + wrapped   PSYCH : ++ anxious      ==================>> MEDICATIONS <<====================    MEDICATIONS  (STANDING):  ALBUTerol   0.5% 2.5 milliGRAM(s) Nebulizer every 6 hours  amLODIPine   Tablet 2.5 milliGRAM(s) Oral daily  aspirin  chewable 81 milliGRAM(s) Oral daily  atorvastatin 40 milliGRAM(s) Oral at bedtime  citalopram 20 milliGRAM(s) Oral daily  clindamycin IVPB 900 milliGRAM(s) IV Intermittent every 8 hours  clindamycin IVPB      docusate sodium 100 milliGRAM(s) Oral three times a day  enoxaparin Injectable 40 milliGRAM(s) SubCutaneous daily  folic acid 1 milliGRAM(s) Oral daily  furosemide   Injectable 40 milliGRAM(s) IV Push daily  gabapentin 100 milliGRAM(s) Oral every 8 hours  lactobacillus acidophilus 1 Tablet(s) Oral three times a day with meals  levothyroxine 125 MICROGram(s) Oral daily  losartan 100 milliGRAM(s) Oral daily  multivitamin 1 Tablet(s) Oral daily  predniSONE   Tablet 5 milliGRAM(s) Oral daily  tiotropium 18 MICROgram(s) Capsule 1 Capsule(s) Inhalation daily    MEDICATIONS  (PRN):  acetaminophen   Tablet .. 650 milliGRAM(s) Oral every 6 hours PRN Temp greater or equal to 38C (100.4F), Mild Pain (1 - 3)  ALBUTerol    90 MICROgram(s) HFA Inhaler 2 Puff(s) Inhalation every 6 hours PRN Shortness of Breath and/or Wheezing  LORazepam     Tablet 1 milliGRAM(s) Oral four times a day PRN Anxiety  melatonin 3 milliGRAM(s) Oral at bedtime PRN Insomnia  oxyCODONE    IR 10 milliGRAM(s) Oral every 6 hours PRN Moderate Pain (4 - 6)      ==================>> VITAL SIGNS <<==================    T(C): 36.5 (19 @ 06:18), Max: 37.2 (19 @ 16:37)  HR: 85 (19 @ 09:45) (72 - 93)  BP: 122/62 (19 @ 06:18) (110/53 - 141/72)  RR: 20 (19 @ 06:18) (20 - 20)  SpO2: 96% (19 @ 09:45) (96% - 100%)     ==================>> LAB AND IMAGING <<==================                        9.9    7.77  )-----------( 162      ( 18 Aug 2019 06:23 )             34.5        141  |  100  |  13  ----------------------------<  94  3.5   |  32<H>  |  0.61    Ca    10.0      18 Aug 2019 06:23  Phos  3.9       Mg     2.0         TPro  6.2  /  Alb  3.4  /  TBili  0.6  /  DBili  x   /  AST  16  /  ALT  12  /  AlkPhos  81  08-18    PT/INR - ( 17 Aug 2019 06:05 )   PT: 12.1 SEC;   INR: 1.09     PTT - ( 17 Aug 2019 06:05 )  PTT:36.5 SEC               Urinalysis Basic - ( 17 Aug 2019 16:28 )  Color: LIGHT YELLOW / Appearance: CLEAR / S.008 / pH: 7.0  Gluc: NEGATIVE / Ketone: NEGATIVE  / Bili: NEGATIVE / Urobili: NORMAL   Blood: NEGATIVE / Protein: NEGATIVE / Nitrite: NEGATIVE   Leuk Esterase: LARGE / RBC: 0-2 / WBC 11-25   Sq Epi: OCC / Non Sq Epi: x / Bacteria: MODERATE    TSH:      2.15   (19)           Lipid profile:  (19)     Total: 140     LDL  : 64     HDL  :63     TG   :85    < from: [facility] Duplex Ext Veins Lower Comp, Bilat. (19 @ 18:14) >  Summary/Impressions:   Technically difficult and limited study.  No evidence of deep vein thrombosis noted in the  visualized bilateral  lower extremities.  The bilateral  calf veins were not well seen due to significant subcutaneous edema.  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     Problem/Plan - 2:  ·  Problem: Leg swelling: likely with venous insufficiency and incompliacne with meds / diuretics at home  as above  Lasix IV  elevation   wrapping as above.   vascular follow up    Problem/Plan - 3:  ·  Problem: R/O ACS   cardiology  appreciated  likely no acute cardiac event   recently had cardiac workup   monitor vitals closely  Continue Current medications and monitor.   f/u cardio recom.     Problem/Plan - 4:  ·  Problem: Anxiety.    Continue Current medications and monitor.   consider psych consult as needed.   pt needs close psych f//u as OP as well     Problem/Plan - 5:  ·  Problem: COPD   continue inhalers   Duoneb as needed   continue prednisone as above     Problem/Plan - 6:  Problem: Hypertension.   continue home medication with hold parameters   + DASH diet  cardio f/u.    Problem/Plan - 7:  ·  Problem: Asthma.  Plan: as above.     Problem/Plan - 8:  ·  Problem: Hyperlipidemia.  Plan: + FLP   + continue atorvastatin 40   + low fat diet.     -GI/DVT Prophylaxis.    --------------------------------------------  Case discussed with pt, RN, NP  Education given on findings and plan of care  ___________________________  H. JANICE Welch.  Pager: [phone]

## 2019-08-19 ENCOUNTER — TRANSCRIPTION ENCOUNTER (OUTPATIENT)
Age: 69
End: 2019-08-19

## 2019-08-19 LAB
ALBUMIN SERPL ELPH-MCNC: 3.3 G/DL — SIGNIFICANT CHANGE UP (ref 3.3–5)
ALP SERPL-CCNC: 81 U/L — SIGNIFICANT CHANGE UP (ref 40–120)
ALT FLD-CCNC: 11 U/L — SIGNIFICANT CHANGE UP (ref 4–33)
ANION GAP SERPL CALC-SCNC: 10 MMO/L — SIGNIFICANT CHANGE UP (ref 7–14)
AST SERPL-CCNC: 14 U/L — SIGNIFICANT CHANGE UP (ref 4–32)
BILIRUB SERPL-MCNC: 0.5 MG/DL — SIGNIFICANT CHANGE UP (ref 0.2–1.2)
BUN SERPL-MCNC: 10 MG/DL — SIGNIFICANT CHANGE UP (ref 7–23)
CALCIUM SERPL-MCNC: 10.1 MG/DL — SIGNIFICANT CHANGE UP (ref 8.4–10.5)
CHLORIDE SERPL-SCNC: 101 MMOL/L — SIGNIFICANT CHANGE UP (ref 98–107)
CO2 SERPL-SCNC: 32 MMOL/L — HIGH (ref 22–31)
CREAT SERPL-MCNC: 0.61 MG/DL — SIGNIFICANT CHANGE UP (ref 0.5–1.3)
GLUCOSE SERPL-MCNC: 92 MG/DL — SIGNIFICANT CHANGE UP (ref 70–99)
HCT VFR BLD CALC: 33.2 % — LOW (ref 34.5–45)
HGB BLD-MCNC: 9.8 G/DL — LOW (ref 11.5–15.5)
MCHC RBC-ENTMCNC: 26.8 PG — LOW (ref 27–34)
MCHC RBC-ENTMCNC: 29.5 % — LOW (ref 32–36)
MCV RBC AUTO: 91 FL — SIGNIFICANT CHANGE UP (ref 80–100)
NRBC # FLD: 0 K/UL — SIGNIFICANT CHANGE UP (ref 0–0)
PLATELET # BLD AUTO: 163 K/UL — SIGNIFICANT CHANGE UP (ref 150–400)
PMV BLD: 11.2 FL — SIGNIFICANT CHANGE UP (ref 7–13)
POTASSIUM SERPL-MCNC: 3.6 MMOL/L — SIGNIFICANT CHANGE UP (ref 3.5–5.3)
POTASSIUM SERPL-SCNC: 3.6 MMOL/L — SIGNIFICANT CHANGE UP (ref 3.5–5.3)
PROT SERPL-MCNC: 6 G/DL — SIGNIFICANT CHANGE UP (ref 6–8.3)
RBC # BLD: 3.65 M/UL — LOW (ref 3.8–5.2)
RBC # FLD: 15.6 % — HIGH (ref 10.3–14.5)
SODIUM SERPL-SCNC: 143 MMOL/L — SIGNIFICANT CHANGE UP (ref 135–145)
WBC # BLD: 6.87 K/UL — SIGNIFICANT CHANGE UP (ref 3.8–10.5)
WBC # FLD AUTO: 6.87 K/UL — SIGNIFICANT CHANGE UP (ref 3.8–10.5)

## 2019-08-19 PROCEDURE — 99232 SBSQ HOSP IP/OBS MODERATE 35: CPT

## 2019-08-19 PROCEDURE — 99233 SBSQ HOSP IP/OBS HIGH 50: CPT

## 2019-08-19 RX ORDER — HYDROXYZINE HCL 10 MG
25 TABLET ORAL EVERY 6 HOURS
Refills: 0 | Status: DISCONTINUED | OUTPATIENT
Start: 2019-08-19 | End: 2019-08-20

## 2019-08-19 RX ORDER — ALBUTEROL 90 UG/1
2.5 AEROSOL, METERED ORAL ONCE
Refills: 0 | Status: COMPLETED | OUTPATIENT
Start: 2019-08-19 | End: 2019-08-19

## 2019-08-19 RX ADMIN — ALBUTEROL 2 PUFF(S): 90 AEROSOL, METERED ORAL at 20:45

## 2019-08-19 RX ADMIN — Medication 81 MILLIGRAM(S): at 12:06

## 2019-08-19 RX ADMIN — ENOXAPARIN SODIUM 40 MILLIGRAM(S): 100 INJECTION SUBCUTANEOUS at 12:06

## 2019-08-19 RX ADMIN — Medication 125 MICROGRAM(S): at 05:48

## 2019-08-19 RX ADMIN — OXYCODONE HYDROCHLORIDE 10 MILLIGRAM(S): 5 TABLET ORAL at 06:15

## 2019-08-19 RX ADMIN — GABAPENTIN 100 MILLIGRAM(S): 400 CAPSULE ORAL at 13:49

## 2019-08-19 RX ADMIN — OXYCODONE HYDROCHLORIDE 10 MILLIGRAM(S): 5 TABLET ORAL at 18:40

## 2019-08-19 RX ADMIN — TIOTROPIUM BROMIDE 1 CAPSULE(S): 18 CAPSULE ORAL; RESPIRATORY (INHALATION) at 12:06

## 2019-08-19 RX ADMIN — Medication 1 MILLIGRAM(S): at 08:01

## 2019-08-19 RX ADMIN — Medication 1 MILLIGRAM(S): at 12:05

## 2019-08-19 RX ADMIN — GABAPENTIN 100 MILLIGRAM(S): 400 CAPSULE ORAL at 22:57

## 2019-08-19 RX ADMIN — Medication 2 MILLIGRAM(S): at 12:05

## 2019-08-19 RX ADMIN — OXYCODONE HYDROCHLORIDE 10 MILLIGRAM(S): 5 TABLET ORAL at 17:52

## 2019-08-19 RX ADMIN — ATORVASTATIN CALCIUM 40 MILLIGRAM(S): 80 TABLET, FILM COATED ORAL at 22:57

## 2019-08-19 RX ADMIN — Medication 5 MILLIGRAM(S): at 05:48

## 2019-08-19 RX ADMIN — Medication 3 MILLIGRAM(S): at 22:57

## 2019-08-19 RX ADMIN — ALBUTEROL 2.5 MILLIGRAM(S): 90 AEROSOL, METERED ORAL at 16:56

## 2019-08-19 RX ADMIN — Medication 40 MILLIGRAM(S): at 05:48

## 2019-08-19 RX ADMIN — OXYCODONE HYDROCHLORIDE 10 MILLIGRAM(S): 5 TABLET ORAL at 05:48

## 2019-08-19 RX ADMIN — ALBUTEROL 2.5 MILLIGRAM(S): 90 AEROSOL, METERED ORAL at 09:18

## 2019-08-19 RX ADMIN — OXYCODONE HYDROCHLORIDE 10 MILLIGRAM(S): 5 TABLET ORAL at 12:04

## 2019-08-19 RX ADMIN — LOSARTAN POTASSIUM 100 MILLIGRAM(S): 100 TABLET, FILM COATED ORAL at 05:48

## 2019-08-19 RX ADMIN — Medication 1 MILLIGRAM(S): at 13:49

## 2019-08-19 RX ADMIN — OXYCODONE HYDROCHLORIDE 10 MILLIGRAM(S): 5 TABLET ORAL at 13:00

## 2019-08-19 RX ADMIN — GABAPENTIN 100 MILLIGRAM(S): 400 CAPSULE ORAL at 05:48

## 2019-08-19 RX ADMIN — ALBUTEROL 2.5 MILLIGRAM(S): 90 AEROSOL, METERED ORAL at 03:24

## 2019-08-19 RX ADMIN — AMLODIPINE BESYLATE 2.5 MILLIGRAM(S): 2.5 TABLET ORAL at 05:48

## 2019-08-19 RX ADMIN — CITALOPRAM 20 MILLIGRAM(S): 10 TABLET, FILM COATED ORAL at 12:06

## 2019-08-19 RX ADMIN — ALBUTEROL 2.5 MILLIGRAM(S): 90 AEROSOL, METERED ORAL at 13:59

## 2019-08-19 RX ADMIN — ALBUTEROL 2.5 MILLIGRAM(S): 90 AEROSOL, METERED ORAL at 21:01

## 2019-08-19 RX ADMIN — ALBUTEROL 2 PUFF(S): 90 AEROSOL, METERED ORAL at 05:54

## 2019-08-19 NOTE — PHYSICAL THERAPY INITIAL EVALUATION ADULT - PERTINENT HX OF CURRENT PROBLEM, REHAB EVAL
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

## 2019-08-19 NOTE — PROGRESS NOTE ADULT - ASSESSMENT
_________________________________________________________________________________________  ========>>  M E D I C A L   A T T E N D I N G    F O L L O W  U P  N O T E  <<=========  -----------------------------------------------------------------------------------------------------    - Patient seen and examined by me earlier today.   - In summary,  SHEKHAR VASQUEZ is a 68y year old woman who originally presented with leg edema   - Patient today overall doing ok, comfortable, eating OK.  feels less anxious, leg feels better     ==================>> REVIEW OF SYSTEM <<=================    GEN: no fever, no chills, + chronic generalized and back pain. stable /controlled   RESP: no SOB, no cough, no sputum  CVS: no chest pain, no palpitations, + stable edema and pain in Rt leg   GI: no abdominal pain, no nausea, no constipation, no diarrhea  : no dysuria, no frequency, no hematuria  Neuro: no headache, no dizziness  Derm : no itching, no rash    ==================>> PHYSICAL EXAM <<=================    GEN: A&O X 2 , NAD , comfortable, less anxious, appreciative    HEENT: NCAT, PERRL, MMM, hearing intact  Neck: supple , no JVD  CVS: S1S2 , regular , No M/R/G appreciated  PULM: CTA B/L,  no W/R/R appreciated  ABD.: soft. non tender, non distended,  bowel sounds present  Extrem: intact pulses , + B/L LE edema  and Lt sided erythema and tenderness, + wrapped , appears improved   PSYCH : less anxious       ==================>> MEDICATIONS <<====================    ALBUTerol   0.5% 2.5 milliGRAM(s) Nebulizer every 6 hours  amLODIPine   Tablet 2.5 milliGRAM(s) Oral daily  aspirin  chewable 81 milliGRAM(s) Oral daily  atorvastatin 40 milliGRAM(s) Oral at bedtime  citalopram 20 milliGRAM(s) Oral daily  docusate sodium 100 milliGRAM(s) Oral three times a day  enoxaparin Injectable 40 milliGRAM(s) SubCutaneous daily  folic acid 1 milliGRAM(s) Oral daily  gabapentin 100 milliGRAM(s) Oral every 8 hours  lactobacillus acidophilus 1 Tablet(s) Oral three times a day with meals  levothyroxine 125 MICROGram(s) Oral daily  LORazepam     Tablet 1 milliGRAM(s) Oral three times a day  losartan 100 milliGRAM(s) Oral daily  multivitamin 1 Tablet(s) Oral daily  predniSONE   Tablet 5 milliGRAM(s) Oral daily  tiotropium 18 MICROgram(s) Capsule 1 Capsule(s) Inhalation daily  torsemide 20 milliGRAM(s) Oral daily  trihexyphenidyl 2 milliGRAM(s) Oral daily    MEDICATIONS  (PRN):  acetaminophen   Tablet .. 650 milliGRAM(s) Oral every 6 hours PRN Temp greater or equal to 38C (100.4F), Mild Pain (1 - 3)  ALBUTerol    90 MICROgram(s) HFA Inhaler 2 Puff(s) Inhalation every 4 hours PRN Shortness of Breath  hydrOXYzine hydrochloride 25 milliGRAM(s) Oral every 6 hours PRN Anxiety  melatonin 3 milliGRAM(s) Oral at bedtime PRN Insomnia  oxyCODONE    IR 10 milliGRAM(s) Oral every 6 hours PRN Moderate Pain (4 - 6)    ==================>> VITAL SIGNS <<==================    Vital Signs Last 24 Hrs  T(C): 36.9 (08-19-19 @ 14:09)  T(F): 98.4 (08-19-19 @ 14:09), Max: 98.4 (08-19-19 @ 14:09)  HR: 86 (08-19-19 @ 14:09) (75 - 88)  BP: 144/80 (08-19-19 @ 14:09)  RR: 20 (08-19-19 @ 14:09) (20 - 20)  SpO2: 95% (08-19-19 @ 14:09) (95% - 100%)       ==================>> LAB AND IMAGING <<==================                        9.8    6.87  )-----------( 163      ( 19 Aug 2019 05:19 )             33.2        143  |  101  |  10  ----------------------------<  92  3.6   |  32<H>  |  0.61    Ca    10.1      19 Aug 2019 05:19  Phos  3.9     08-18  Mg     2.0     08-18    TPro  6.0  /  Alb  3.3  /  TBili  0.5  /  DBili  x   /  AST  14  /  ALT  11  /  AlkPhos  81  08-19    WBC count:   6.87 <<== ,  7.77 <<== ,  13.34 <<==   Hemoglobin:   9.8 <<==,  9.9 <<==,  10.8 <<==  platelets:  163 <==, 162 <==, 203 <==    Creatinine:  0.61  <<==, 0.61  <<==, 0.69  <<==  Sodium:   143  <==, 141  <==, 141  <==       AST:          14 <== , 16 <== , 17 <==      ALT:        11  <== , 12  <== , 14  <==      AP:        81  <=, 81  <=, 99  <=     Bili:        0.5  <=, 0.6  <=, 0.7  <=    < from: [facility] Duplex Ext Veins Lower Comp, Bilat. (08.17.19 @ 18:14) >  Summary/Impressions:   Technically difficult and limited study.  No evidence of deep vein thrombosis noted in the  visualized bilateral  lower extremities.  The bilateral  calf veins were not well seen due to significant subcutaneous edema.  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     Problem/Plan - 2:  ·  Problem: Leg swelling: likely with venous insufficiency and incompliacne with meds / diuretics at home  as above  Lasix IV  elevation   wrapping as above.   vascular appreciated and discussed      as above    Problem/Plan - 3:  ·  Problem: R/O ACS   cardiology  appreciated  likely no acute cardiac event   recently had cardiac workup   monitor vitals closely  Continue Current medications and monitor.   f/u cardio recom.     Problem/Plan - 4:  ·  Problem: Anxiety.    Continue Current medications and monitor.   psych consult appreciated   pt needs close psych f//u as OP as well     Problem/Plan - 5:  ·  Problem: COPD   continue inhalers   Duoneb as needed   continue prednisone as above     Problem/Plan - 6:  Problem: Hypertension.   continue home medication with hold parameters   + DASH diet  cardio f/u.    Problem/Plan - 7:  ·  Problem: Asthma.  Plan: as above.     Problem/Plan - 8:  ·  Problem: Hyperlipidemia.  Plan: + FLP   + continue atorvastatin 40   + low fat diet.     -GI/DVT Prophylaxis.    hopefully Dispo planing in 24-48 hrs if stable / better     --------------------------------------------  Case discussed with pt, RN  Education given on findings and plan of care  ___________________________  H. JANICE Welch.  Pager: [phone]

## 2019-08-19 NOTE — PROGRESS NOTE ADULT - SUBJECTIVE AND OBJECTIVE BOX
Cardiovascular Disease Progress Note    Overnight events: No acute events overnight.  legs less edematous. no cp/sob/palps/dizziness/edema. requesting nebulizers.  Otherwise review of systems negative    Objective Findings:  T(C): 36.6 (08-19-19 @ 05:44), Max: 36.6 (08-19-19 @ 05:44)  HR: 83 (08-19-19 @ 05:44) (75 - 94)  BP: 147/64 (08-19-19 @ 05:44) (133/51 - 147/64)  RR: 20 (08-19-19 @ 05:44) (19 - 20)  SpO2: 100% (08-19-19 @ 05:44) (96% - 100%)  Wt(kg): --  Daily     Daily       Physical Exam:  Gen: NAD  HEENT: EOMI  CV: RRR, normal S1 + S2, no m/r/g  Lungs: CTAB  Abd: soft, non-tender  Ext: mild edema and surrounding erythema      Laboratory Data:                        9.8    6.87  )-----------( 163      ( 19 Aug 2019 05:19 )             33.2     08-19    143  |  101  |  10  ----------------------------<  92  3.6   |  32<H>  |  0.61    Ca    10.1      19 Aug 2019 05:19  Phos  3.9     08-18  Mg     2.0     08-18    TPro  6.0  /  Alb  3.3  /  TBili  0.5  /  DBili  x   /  AST  14  /  ALT  11  /  AlkPhos  81  08-19      CARDIAC MARKERS ( 18 Aug 2019 11:46 )  x     / x     / 62 u/L / x     / x              Inpatient Medications:  MEDICATIONS  (STANDING):  ALBUTerol   0.5% 2.5 milliGRAM(s) Nebulizer every 6 hours  amLODIPine   Tablet 2.5 milliGRAM(s) Oral daily  aspirin  chewable 81 milliGRAM(s) Oral daily  atorvastatin 40 milliGRAM(s) Oral at bedtime  citalopram 20 milliGRAM(s) Oral daily  docusate sodium 100 milliGRAM(s) Oral three times a day  enoxaparin Injectable 40 milliGRAM(s) SubCutaneous daily  folic acid 1 milliGRAM(s) Oral daily  furosemide   Injectable 40 milliGRAM(s) IV Push daily  gabapentin 100 milliGRAM(s) Oral every 8 hours  lactobacillus acidophilus 1 Tablet(s) Oral three times a day with meals  levothyroxine 125 MICROGram(s) Oral daily  losartan 100 milliGRAM(s) Oral daily  multivitamin 1 Tablet(s) Oral daily  predniSONE   Tablet 5 milliGRAM(s) Oral daily  tiotropium 18 MICROgram(s) Capsule 1 Capsule(s) Inhalation daily  trihexyphenidyl 2 milliGRAM(s) Oral daily      Assessment:  -significant LE swelling, with normal BNP, neg cxr and not significantly volume up on exam, suspect venous disease  -atypical cp with elevated hs trop and neg delta  -copd/asthma  -obesity  -melba  -anxiety    Recs:  -ekg without acute ischemic changes and CE at baseline. would defer ischemic workup at this time as unlikely to tolerate pharmacologic stress testing and any further downstream/invasive procedures if warranted at this time. will consider CTA cors in future if patient agreeable  -c/w asa and statin for elevated ASCVD risk  -resume torsemide 20mg daily. recent TTE  withuot any acute findings. notably amlodipine and prednisone can be contributing to LE edema, will likely transition to alternative antiphypertensive. pred dose reduced  -venous duplex difficult study but no obvious dvt  -cont wrap legs in ace bandages and keep elevated  -vascular recs appreciated  -elevated wbc, possible LE cellulitis. ID consult apreciated. wbc normalized and no obvious cellulitic changes. monitor off abx for now  -c/w anti hypertensives. bp adequately controlled  -c/w BD for asthma  -PT/OT  -DVT ppx        Over 25 minutes spent on total encounter; more than 50% of the visit was spent counseling and/or coordinating care by the attending physician.      Juan Salcedo MD   Cardiovascular Disease  [phone]

## 2019-08-19 NOTE — PROGRESS NOTE BEHAVIORAL HEALTH - NSBHCHARTREVIEWLAB_PSY_A_CORE FT
08-19    143  |  101  |  10  ----------------------------<  92  3.6   |  32<H>  |  0.61    Ca    10.1      19 Aug 2019 05:19  Phos  3.9     08-18  Mg     2.0     08-18    TPro  6.0  /  Alb  3.3  /  TBili  0.5  /  DBili  x   /  AST  14  /  ALT  11  /  AlkPhos  81  08-19                          9.8    6.87  )-----------( 163      ( 19 Aug 2019 05:19 )             33.2

## 2019-08-19 NOTE — PROGRESS NOTE BEHAVIORAL HEALTH - NSBHCHARTREVIEWVS_PSY_A_CORE FT
Vital Signs Last 24 Hrs  T(C): 36.6 (19 Aug 2019 05:44), Max: 36.6 (19 Aug 2019 05:44)  T(F): 97.8 (19 Aug 2019 05:44), Max: 97.8 (19 Aug 2019 05:44)  HR: 80 (19 Aug 2019 09:18) (75 - 94)  BP: 147/64 (19 Aug 2019 05:44) (133/51 - 147/64)  BP(mean): --  RR: 20 (19 Aug 2019 05:44) (19 - 20)  SpO2: 100% (19 Aug 2019 09:18) (97% - 100%)

## 2019-08-19 NOTE — PROGRESS NOTE ADULT - SUBJECTIVE AND OBJECTIVE BOX
Patient is a 68y old  Female who presents with a chief complaint of increased swelling of legs (19 Aug 2019 17:53)      Vascular Surgery Attending Progress Note    Interval HPI: pt states both legs are feeling better     Medications:  acetaminophen   Tablet .. 650 milliGRAM(s) Oral every 6 hours PRN  ALBUTerol    90 MICROgram(s) HFA Inhaler 2 Puff(s) Inhalation every 4 hours PRN  ALBUTerol   0.5% 2.5 milliGRAM(s) Nebulizer every 6 hours  amLODIPine   Tablet 2.5 milliGRAM(s) Oral daily  aspirin  chewable 81 milliGRAM(s) Oral daily  atorvastatin 40 milliGRAM(s) Oral at bedtime  citalopram 20 milliGRAM(s) Oral daily  docusate sodium 100 milliGRAM(s) Oral three times a day  enoxaparin Injectable 40 milliGRAM(s) SubCutaneous daily  folic acid 1 milliGRAM(s) Oral daily  gabapentin 100 milliGRAM(s) Oral every 8 hours  hydrOXYzine hydrochloride 25 milliGRAM(s) Oral every 6 hours PRN  lactobacillus acidophilus 1 Tablet(s) Oral three times a day with meals  levothyroxine 125 MICROGram(s) Oral daily  LORazepam     Tablet 1 milliGRAM(s) Oral three times a day  losartan 100 milliGRAM(s) Oral daily  melatonin 3 milliGRAM(s) Oral at bedtime PRN  multivitamin 1 Tablet(s) Oral daily  oxyCODONE    IR 10 milliGRAM(s) Oral every 6 hours PRN  predniSONE   Tablet 5 milliGRAM(s) Oral daily  tiotropium 18 MICROgram(s) Capsule 1 Capsule(s) Inhalation daily  torsemide 20 milliGRAM(s) Oral daily  trihexyphenidyl 2 milliGRAM(s) Oral daily      Vital Signs Last 24 Hrs  T(C): 36.9 (19 Aug 2019 14:09), Max: 36.9 (19 Aug 2019 14:09)  T(F): 98.4 (19 Aug 2019 14:09), Max: 98.4 (19 Aug 2019 14:09)  HR: 86 (19 Aug 2019 14:09) (75 - 88)  BP: 144/80 (19 Aug 2019 14:09) (133/51 - 147/64)  BP(mean): --  RR: 20 (19 Aug 2019 14:09) (20 - 20)  SpO2: 95% (19 Aug 2019 14:09) (95% - 100%)  I&O's Summary      Physical Exam:  Neuro  A&Ox3 VSS  Vascular:  stable  ID mild le w mod dec in edema and cellulitis     LABS:                        9.8    6.87  )-----------( 163      ( 19 Aug 2019 05:19 )             33.2     08-19    143  |  101  |  10  ----------------------------<  92  3.6   |  32<H>  |  0.61    Ca    10.1      19 Aug 2019 05:19  Phos  3.9     08-18  Mg     2.0     08-18    TPro  6.0  /  Alb  3.3  /  TBili  0.5  /  DBili  x   /  AST  14  /  ALT  11  /  AlkPhos  81  08-19        MARJ RODRIGUEZ MD  [phone]

## 2019-08-19 NOTE — PHYSICAL THERAPY INITIAL EVALUATION ADULT - ADDITIONAL COMMENTS
patient lives in coop on the 1st floor; uses a rolling walker and assist for short distances ambulation. Patient stays in wheelchair most of the day

## 2019-08-19 NOTE — PROGRESS NOTE BEHAVIORAL HEALTH - SUMMARY
68F with extensive PMHx, PPHx anxiety not in outpatient treatment, admitted for cellulitis and LE edema. CL Psych consulted for capacity to leave AMA and anxiety.     Patient anxious. Should be back on home ativan which she takes regularly at lower 75% dose so as to avoid withdrawal. No si/i/p or hi/i/p. No withdrawal features other than tremors which are also informed by anxiety. No current signs of delirium.     PLAN:  - No capacity to leave AMA.  - begin Ativan 1mg TID standing (on QID at home which patient takes regularly)  - Continue to address medical symptoms exacerbating anxiety.  - Continue with Celexa 20mg PO qD, follow EKG to ensure QTc < 500ms.  - Atarax 25mg q6h prn anxiety   - CL Psych to follow.

## 2019-08-20 ENCOUNTER — TRANSCRIPTION ENCOUNTER (OUTPATIENT)
Age: 69
End: 2019-08-20

## 2019-08-20 VITALS
SYSTOLIC BLOOD PRESSURE: 130 MMHG | RESPIRATION RATE: 20 BRPM | HEART RATE: 74 BPM | TEMPERATURE: 98 F | DIASTOLIC BLOOD PRESSURE: 58 MMHG | OXYGEN SATURATION: 97 %

## 2019-08-20 DIAGNOSIS — E03.9 HYPOTHYROIDISM, UNSPECIFIED: ICD-10-CM

## 2019-08-20 DIAGNOSIS — I24.9 ACUTE ISCHEMIC HEART DISEASE, UNSPECIFIED: ICD-10-CM

## 2019-08-20 DIAGNOSIS — J44.9 CHRONIC OBSTRUCTIVE PULMONARY DISEASE, UNSPECIFIED: ICD-10-CM

## 2019-08-20 DIAGNOSIS — J45.909 UNSPECIFIED ASTHMA, UNCOMPLICATED: ICD-10-CM

## 2019-08-20 DIAGNOSIS — E78.5 HYPERLIPIDEMIA, UNSPECIFIED: ICD-10-CM

## 2019-08-20 DIAGNOSIS — I10 ESSENTIAL (PRIMARY) HYPERTENSION: ICD-10-CM

## 2019-08-20 LAB
ANION GAP SERPL CALC-SCNC: 13 MMO/L — SIGNIFICANT CHANGE UP (ref 7–14)
BUN SERPL-MCNC: 9 MG/DL — SIGNIFICANT CHANGE UP (ref 7–23)
CALCIUM SERPL-MCNC: 10.1 MG/DL — SIGNIFICANT CHANGE UP (ref 8.4–10.5)
CHLORIDE SERPL-SCNC: 99 MMOL/L — SIGNIFICANT CHANGE UP (ref 98–107)
CO2 SERPL-SCNC: 34 MMOL/L — HIGH (ref 22–31)
CREAT SERPL-MCNC: 0.59 MG/DL — SIGNIFICANT CHANGE UP (ref 0.5–1.3)
GLUCOSE SERPL-MCNC: 107 MG/DL — HIGH (ref 70–99)
HCT VFR BLD CALC: 35.8 % — SIGNIFICANT CHANGE UP (ref 34.5–45)
HGB BLD-MCNC: 10.8 G/DL — LOW (ref 11.5–15.5)
MAGNESIUM SERPL-MCNC: 1.9 MG/DL — SIGNIFICANT CHANGE UP (ref 1.6–2.6)
MCHC RBC-ENTMCNC: 27.1 PG — SIGNIFICANT CHANGE UP (ref 27–34)
MCHC RBC-ENTMCNC: 30.2 % — LOW (ref 32–36)
MCV RBC AUTO: 89.9 FL — SIGNIFICANT CHANGE UP (ref 80–100)
NRBC # FLD: 0 K/UL — SIGNIFICANT CHANGE UP (ref 0–0)
PHOSPHATE SERPL-MCNC: 3.6 MG/DL — SIGNIFICANT CHANGE UP (ref 2.5–4.5)
PLATELET # BLD AUTO: 176 K/UL — SIGNIFICANT CHANGE UP (ref 150–400)
PMV BLD: 11.1 FL — SIGNIFICANT CHANGE UP (ref 7–13)
POTASSIUM SERPL-MCNC: 3.4 MMOL/L — LOW (ref 3.5–5.3)
POTASSIUM SERPL-SCNC: 3.4 MMOL/L — LOW (ref 3.5–5.3)
RBC # BLD: 3.98 M/UL — SIGNIFICANT CHANGE UP (ref 3.8–5.2)
RBC # FLD: 15.4 % — HIGH (ref 10.3–14.5)
SODIUM SERPL-SCNC: 146 MMOL/L — HIGH (ref 135–145)
WBC # BLD: 7.73 K/UL — SIGNIFICANT CHANGE UP (ref 3.8–10.5)
WBC # FLD AUTO: 7.73 K/UL — SIGNIFICANT CHANGE UP (ref 3.8–10.5)

## 2019-08-20 PROCEDURE — 99232 SBSQ HOSP IP/OBS MODERATE 35: CPT

## 2019-08-20 RX ORDER — CITALOPRAM 10 MG/1
10 TABLET, FILM COATED ORAL
Qty: 0 | Refills: 0 | DISCHARGE

## 2019-08-20 RX ORDER — ALBUTEROL 90 UG/1
2 AEROSOL, METERED ORAL
Qty: 0 | Refills: 0 | DISCHARGE

## 2019-08-20 RX ORDER — ATORVASTATIN CALCIUM 80 MG/1
1 TABLET, FILM COATED ORAL
Qty: 0 | Refills: 0 | DISCHARGE

## 2019-08-20 RX ORDER — LEVOTHYROXINE SODIUM 125 MCG
1 TABLET ORAL
Qty: 0 | Refills: 0 | DISCHARGE

## 2019-08-20 RX ORDER — ALBUTEROL 90 UG/1
3 AEROSOL, METERED ORAL
Qty: 0 | Refills: 0 | DISCHARGE

## 2019-08-20 RX ORDER — TIOTROPIUM BROMIDE 18 UG/1
1 CAPSULE ORAL; RESPIRATORY (INHALATION)
Qty: 30 | Refills: 0
Start: 2019-08-20 | End: 2019-09-18

## 2019-08-20 RX ORDER — ASPIRIN/CALCIUM CARB/MAGNESIUM 324 MG
1 TABLET ORAL
Qty: 0 | Refills: 0 | DISCHARGE

## 2019-08-20 RX ORDER — ALBUTEROL 90 UG/1
3 AEROSOL, METERED ORAL
Qty: 120 | Refills: 1
Start: 2019-08-20 | End: 2019-10-18

## 2019-08-20 RX ORDER — LACTOBACILLUS ACIDOPHILUS 100MM CELL
1 CAPSULE ORAL
Qty: 90 | Refills: 0
Start: 2019-08-20 | End: 2019-09-18

## 2019-08-20 RX ORDER — AMLODIPINE BESYLATE 2.5 MG/1
1 TABLET ORAL
Qty: 0 | Refills: 0 | DISCHARGE

## 2019-08-20 RX ORDER — FOLIC ACID 0.8 MG
1 TABLET ORAL
Qty: 0 | Refills: 0 | DISCHARGE

## 2019-08-20 RX ORDER — POTASSIUM CHLORIDE 20 MEQ
20 PACKET (EA) ORAL ONCE
Refills: 0 | Status: COMPLETED | OUTPATIENT
Start: 2019-08-20 | End: 2019-08-20

## 2019-08-20 RX ORDER — LEVOTHYROXINE SODIUM 125 MCG
1 TABLET ORAL
Qty: 30 | Refills: 0
Start: 2019-08-20 | End: 2019-09-18

## 2019-08-20 RX ORDER — ALBUTEROL 90 UG/1
2 AEROSOL, METERED ORAL
Qty: 1 | Refills: 1
Start: 2019-08-20 | End: 2019-10-18

## 2019-08-20 RX ORDER — GABAPENTIN 400 MG/1
2 CAPSULE ORAL
Qty: 90 | Refills: 0
Start: 2019-08-20 | End: 2019-09-03

## 2019-08-20 RX ORDER — TIOTROPIUM BROMIDE 18 UG/1
1 CAPSULE ORAL; RESPIRATORY (INHALATION)
Qty: 0 | Refills: 0 | DISCHARGE

## 2019-08-20 RX ORDER — LOSARTAN POTASSIUM 100 MG/1
1 TABLET, FILM COATED ORAL
Qty: 0 | Refills: 0 | DISCHARGE

## 2019-08-20 RX ORDER — LANOLIN ALCOHOL/MO/W.PET/CERES
1 CREAM (GRAM) TOPICAL
Qty: 15 | Refills: 0
Start: 2019-08-20 | End: 2019-09-03

## 2019-08-20 RX ORDER — DOCUSATE SODIUM 100 MG
1 CAPSULE ORAL
Qty: 90 | Refills: 0
Start: 2019-08-20 | End: 2019-09-18

## 2019-08-20 RX ORDER — CITALOPRAM 10 MG/1
1 TABLET, FILM COATED ORAL
Qty: 15 | Refills: 0
Start: 2019-08-20 | End: 2019-09-03

## 2019-08-20 RX ADMIN — Medication 81 MILLIGRAM(S): at 11:12

## 2019-08-20 RX ADMIN — AMLODIPINE BESYLATE 2.5 MILLIGRAM(S): 2.5 TABLET ORAL at 05:28

## 2019-08-20 RX ADMIN — Medication 100 MILLIGRAM(S): at 05:28

## 2019-08-20 RX ADMIN — CITALOPRAM 20 MILLIGRAM(S): 10 TABLET, FILM COATED ORAL at 11:13

## 2019-08-20 RX ADMIN — GABAPENTIN 100 MILLIGRAM(S): 400 CAPSULE ORAL at 05:28

## 2019-08-20 RX ADMIN — Medication 25 MILLIGRAM(S): at 02:28

## 2019-08-20 RX ADMIN — OXYCODONE HYDROCHLORIDE 10 MILLIGRAM(S): 5 TABLET ORAL at 09:20

## 2019-08-20 RX ADMIN — Medication 1 MILLIGRAM(S): at 05:28

## 2019-08-20 RX ADMIN — Medication 5 MILLIGRAM(S): at 05:28

## 2019-08-20 RX ADMIN — ALBUTEROL 2.5 MILLIGRAM(S): 90 AEROSOL, METERED ORAL at 03:01

## 2019-08-20 RX ADMIN — Medication 2 MILLIGRAM(S): at 11:12

## 2019-08-20 RX ADMIN — ALBUTEROL 2.5 MILLIGRAM(S): 90 AEROSOL, METERED ORAL at 10:08

## 2019-08-20 RX ADMIN — ALBUTEROL 2.5 MILLIGRAM(S): 90 AEROSOL, METERED ORAL at 16:10

## 2019-08-20 RX ADMIN — ENOXAPARIN SODIUM 40 MILLIGRAM(S): 100 INJECTION SUBCUTANEOUS at 11:13

## 2019-08-20 RX ADMIN — ALBUTEROL 2 PUFF(S): 90 AEROSOL, METERED ORAL at 02:21

## 2019-08-20 RX ADMIN — LOSARTAN POTASSIUM 100 MILLIGRAM(S): 100 TABLET, FILM COATED ORAL at 05:28

## 2019-08-20 RX ADMIN — OXYCODONE HYDROCHLORIDE 10 MILLIGRAM(S): 5 TABLET ORAL at 08:29

## 2019-08-20 RX ADMIN — TIOTROPIUM BROMIDE 1 CAPSULE(S): 18 CAPSULE ORAL; RESPIRATORY (INHALATION) at 11:12

## 2019-08-20 RX ADMIN — GABAPENTIN 100 MILLIGRAM(S): 400 CAPSULE ORAL at 12:48

## 2019-08-20 RX ADMIN — Medication 25 MILLIGRAM(S): at 11:10

## 2019-08-20 RX ADMIN — Medication 125 MICROGRAM(S): at 05:28

## 2019-08-20 RX ADMIN — Medication 1 MILLIGRAM(S): at 11:12

## 2019-08-20 RX ADMIN — Medication 1 MILLIGRAM(S): at 14:00

## 2019-08-20 RX ADMIN — Medication 20 MILLIGRAM(S): at 05:29

## 2019-08-20 NOTE — DISCHARGE NOTE PROVIDER - NSDCCAREPROVSEEN_GEN_ALL_CORE_FT
[facility] Medicine ACP, Team  Kamila Welch Erendira Torresee Fonseca  User ADM  Cheyanne Mica  Frank Bean  Hoorbod Delshadfar  Hoorbod Delshadfar  Hoorbod Delshadfar  Juan Adams  Team [facility] Medicine ACP

## 2019-08-20 NOTE — CHART NOTE - NSCHARTNOTEFT_GEN_A_CORE
Patient is medically cleared and optimized for discharge today to home with services  Spoke with Dr. Welch at bedside: patient to resume her home meds upon discharge, no need for ABX as s/p Clinda, WBC wnl and afebrile as per ID. Pt to continue leg wrapping and elevation, f/u with Dr. Salcedo (attending spoke with Dr Salcedo); and f/u Psych and Pulm.  Patient very anxious, feels better with ativan and nebulizers. Called Psych and spoke with Dr. Fajardo who recommended DC meds-> Ativan home dose 1mg QID, and Celexa 20mg QD, no need for atarax or artane upon discharge. F/U outpatient [facility].  Meds and plan of care discussed with patient, stressed the importance of F/U with Dr. Salcedo, Pulmonologist and Psych, and as per pt's request: sent Rx to her preferred pharmacy: [facility] [place] ([phone]).

## 2019-08-20 NOTE — DISCHARGE NOTE NURSING/CASE MANAGEMENT/SOCIAL WORK - NSDCFUADDAPPT_GEN_ALL_CORE_FT
***You must follow up with your outpatient pulmonologist within 1-2 weeks for further management of your asthma/COPD.  **Follow up at [facility] walk in clinic in 1-2 weeks: [phone], call to make appt.

## 2019-08-20 NOTE — DISCHARGE NOTE PROVIDER - NSFOLLOWUPCLINICS_GEN_ALL_ED_FT
[facility]  Behavioral Health  [address] [place] [address]  Phone: [phone]  Fax:   Follow Up Time:

## 2019-08-20 NOTE — DISCHARGE NOTE PROVIDER - NSDCCPCAREPLAN_GEN_ALL_CORE_FT
PRINCIPAL DISCHARGE DIAGNOSIS  Diagnosis: Leg swelling  Assessment and Plan of Treatment: You came in with leg swelling, you ultrasound was negative for clot.   You were seen by the vascular and cardiology team, likely due to medication effect.   Please keep legs wrapped as directed and elevate when sitting.  Follow up with your primary care provider, Dr. Salcedo, for further management; call your doctor immediately if you notice worsening of the swelling or redness, have a fever above 101F or any drainage.  [address] [place] [address]  Phone: [phone], call to make appt. within 1 week of discharge        SECONDARY DISCHARGE DIAGNOSES  Diagnosis: Cellulitis of left lower extremity  Assessment and Plan of Treatment: Treated with short course of antibiotics, you were seen by the infectious disease doctor, no further need for antibiotics as your labs (WBC) have normalized and no visible signs of infection.  No vascular surgery intervention, keep legs wrapped and elevate.  Continue your torsemide and pain medications as prescribed.    Diagnosis: Lymphedema of both lower extremities  Assessment and Plan of Treatment: You were seen by the Vascular team, no intervention at this time.   Keep both your legs wrapped in ACE bandages as directed by the vascular team, and elevate when you are sitting down to prevent worsening of the swelling.    Diagnosis: HTN (hypertension)  Assessment and Plan of Treatment: You were seen by the cardiologist in the hospital, no further inpatient cardiac workup at this time. Continue blood pressure medication regimen as directed. Monitor for any visual changes, headaches or dizziness.  Monitor blood pressure regularly.  Follow up with your primary care provider, Dr. Salcedo, for further management for high blood pressure.  [address] [place] [address]  Phone: [phone], call to make appt. within 1 week of discharge    Diagnosis: Hyperlipidemia  Assessment and Plan of Treatment: Your lipid panel was normal. Continue prescribed medications (aspirin, lipitor) to control your cholesterol levels and a DASH (Low fat/salt) diet. Follow up with your primary care provider upon discharge for further management and monitoring of cholesterol levels    Diagnosis: ACS (acute coronary syndrome)  Assessment and Plan of Treatment: Ruled out, workup unremarkable. You were seen by cardiologist Dr. Salcedo in the hospital. No further workup at this time; please follow up with Dr. Salcedo within 1-2 weeks for further management    Diagnosis: COPD (chronic obstructive pulmonary disease)  Assessment and Plan of Treatment: Stable, you were seen by the cardiologist Dr. Salcedo, your prednisone dose was decreased to 5mg daily due to the swelling and acute condition.   Continue your inhalers, oxygen therapy and nebulizers as directed. Follow up with your outpatient Pulmonologist within 1-2 weeks further evaluation and management; and for annual pulmonary function tests with your outpatient provider. Monitor for asthma exacerbation, such as, shortness of breath, hyperventilation, or any other difficulties breathing and report to the emergency room in the event that your rescue inhaler has not resolved your symptoms.    Diagnosis: Adjustment disorder with anxious mood  Assessment and Plan of Treatment: Improving. You were seen the psychiatrist in the hospital, with medication adjustments. As recommended, please continue to take your Celexa and Ativan as prescribed. Follow up with Dr. Salcedo or you psychiatrist for further evaluation and medical management. If you are ever in need of immediate psychiatric assistance you may reach out to the [facility] [phone].  **Close follow up with Psych outpatient at: [facility] walk in clinic [phone]** PRINCIPAL DISCHARGE DIAGNOSIS  Diagnosis: Leg swelling  Assessment and Plan of Treatment: You came in with leg swelling, you ultrasound was negative for clot.   You were seen by the vascular and cardiology team, likely due to medication effect.   Please keep legs wrapped as directed and elevate when sitting.  Follow up with your primary care provider, Dr. Salcedo, for further management; call your doctor immediately if you notice worsening of the swelling or redness, have a fever above 101F or any drainage.  [address] [place] [address]  Phone: [phone], call to make appt. within 1 week of discharge        SECONDARY DISCHARGE DIAGNOSES  Diagnosis: Cellulitis of left lower extremity  Assessment and Plan of Treatment: Treated with short course of antibiotics, you were seen by the infectious disease doctor, no further need for antibiotics as your labs (WBC) have normalized and no visible signs of infection.  No vascular surgery intervention, keep legs wrapped and elevate.  Continue your torsemide and pain medications as prescribed.    Diagnosis: Lymphedema of both lower extremities  Assessment and Plan of Treatment: You were seen by the Vascular team, no intervention at this time.   Keep both your legs wrapped in ACE bandages as directed by the vascular team, and elevate when you are sitting down to prevent worsening of the swelling.    Diagnosis: HTN (hypertension)  Assessment and Plan of Treatment: You were seen by the cardiologist in the hospital, no further inpatient cardiac workup at this time. Continue blood pressure medication regimen as directed. Monitor for any visual changes, headaches or dizziness.  Monitor blood pressure regularly.  Follow up with your primary care provider, Dr. Salcedo, for further management for high blood pressure.  [address] [place] [address]  Phone: [phone], call to make appt. within 1 week of discharge    Diagnosis: Hyperlipidemia  Assessment and Plan of Treatment: Your lipid panel was normal. Continue prescribed medications (aspirin, lipitor) to control your cholesterol levels and a DASH (Low fat/salt) diet. Follow up with your primary care provider upon discharge for further management and monitoring of cholesterol levels    Diagnosis: ACS (acute coronary syndrome)  Assessment and Plan of Treatment: Ruled out, workup unremarkable. You were seen by cardiologist Dr. Salcedo in the hospital. No further workup at this time; please follow up with Dr. Salcedo within 1-2 weeks for further management    Diagnosis: COPD (chronic obstructive pulmonary disease)  Assessment and Plan of Treatment: Stable, you were seen by the cardiologist Dr. Salcedo, your prednisone dose was decreased to 5mg daily due to the swelling and acute condition.   Continue your inhalers, oxygen therapy and nebulizers as directed. Follow up with your outpatient Pulmonologist within 1-2 weeks further evaluation and management; and for annual pulmonary function tests with your outpatient provider. Monitor for asthma exacerbation, such as, shortness of breath, hyperventilation, or any other difficulties breathing and report to the emergency room in the event that your rescue inhaler has not resolved your symptoms.    Diagnosis: Adjustment disorder with anxious mood  Assessment and Plan of Treatment: Improving. You were seen the psychiatrist in the hospital, with medication adjustments. As recommended, please continue to take your Celexa and Ativan as prescribed. Follow up with Dr. Salcedo or you psychiatrist for further evaluation and medical management. If you are ever in need of immediate psychiatric assistance you may reach out to the [facility] [phone].  **Close follow up with Psych outpatient at: [facility] walk in clinic [phone]**    Diagnosis: Hypothyroidism  Assessment and Plan of Treatment: Continue your thyroid medications as recommended and follow-up with your outpatient provider for continual thyroid function testing and further medication management.

## 2019-08-20 NOTE — PROGRESS NOTE ADULT - PROVIDER SPECIALTY LIST ADULT
Cardiology
Internal Medicine
Internal Medicine
Vascular Surgery
Vascular Surgery
Cardiology
Internal Medicine
Vascular Surgery

## 2019-08-20 NOTE — PROGRESS NOTE ADULT - SUBJECTIVE AND OBJECTIVE BOX
Cardiovascular Disease Progress Note    Overnight events: No acute events overnight.  states had 8 asthma attacks overnight. edema significantly improved. no cp/palps/dizziness/sob    Objective Findings:  T(C): 36.6 (08-20-19 @ 05:26), Max: 36.9 (08-19-19 @ 14:09)  HR: 74 (08-20-19 @ 05:26) (74 - 92)  BP: 130/58 (08-20-19 @ 05:26) (119/55 - 144/80)  RR: 20 (08-20-19 @ 05:26) (20 - 20)  SpO2: 97% (08-20-19 @ 05:26) (95% - 100%)  Wt(kg): --  Daily     Daily       Physical Exam:  Gen: NAD  HEENT: EOMI  CV: RRR, normal S1 + S2, no m/r/g  Lungs: CTAB  Abd: soft, non-tender  Ext: mild le edema and surrounding erythema/venous stasis         Laboratory Data:                        10.8   7.73  )-----------( 176      ( 20 Aug 2019 07:10 )             35.8     08-19    143  |  101  |  10  ----------------------------<  92  3.6   |  32<H>  |  0.61    Ca    10.1      19 Aug 2019 05:19    TPro  6.0  /  Alb  3.3  /  TBili  0.5  /  DBili  x   /  AST  14  /  ALT  11  /  AlkPhos  81  08-19      CARDIAC MARKERS ( 18 Aug 2019 11:46 )  x     / x     / 62 u/L / x     / x              Inpatient Medications:  MEDICATIONS  (STANDING):  ALBUTerol   0.5% 2.5 milliGRAM(s) Nebulizer every 6 hours  amLODIPine   Tablet 2.5 milliGRAM(s) Oral daily  aspirin  chewable 81 milliGRAM(s) Oral daily  atorvastatin 40 milliGRAM(s) Oral at bedtime  citalopram 20 milliGRAM(s) Oral daily  docusate sodium 100 milliGRAM(s) Oral three times a day  enoxaparin Injectable 40 milliGRAM(s) SubCutaneous daily  folic acid 1 milliGRAM(s) Oral daily  gabapentin 100 milliGRAM(s) Oral every 8 hours  lactobacillus acidophilus 1 Tablet(s) Oral three times a day with meals  levothyroxine 125 MICROGram(s) Oral daily  LORazepam     Tablet 1 milliGRAM(s) Oral three times a day  losartan 100 milliGRAM(s) Oral daily  multivitamin 1 Tablet(s) Oral daily  predniSONE   Tablet 5 milliGRAM(s) Oral daily  tiotropium 18 MICROgram(s) Capsule 1 Capsule(s) Inhalation daily  torsemide 20 milliGRAM(s) Oral daily  trihexyphenidyl 2 milliGRAM(s) Oral daily      Assessment:  -significant LE swelling, with normal BNP, neg cxr and not significantly volume up on exam, suspect venous disease  -atypical cp with elevated hs trop and neg delta  -copd/asthma  -obesity  -melba  -anxiety    Recs:  -ekg without acute ischemic changes and CE at baseline. would defer ischemic workup at this time as unlikely to tolerate pharmacologic stress testing and any further downstream/invasive procedures if warranted at this time. will consider CTA cors in future if patient agreeable  -c/w asa and statin for elevated ASCVD risk  -resume torsemide 20mg daily. recent TTE  withuot any acute findings. notably amlodipine and prednisone can be contributing to LE edema, will likely transition to alternative antiphypertensive as outpatient. pred dose reduced  -venous duplex difficult study but no obvious dvt  -cont to wrap legs in ace bandages and keep elevated  -elevated wbc, possible LE cellulitis. ID consult apreciated. wbc normalized and no obvious cellulitic changes. monitor off abx for now  -c/w anti hypertensives. bp adequately controlled  -c/w BD for asthma  -DVT ppx  -dispo planning        Over 25 minutes spent on total encounter; more than 50% of the visit was spent counseling and/or coordinating care by the attending physician.      Juan Salcedo MD   Cardiovascular Disease  [phone]

## 2019-08-20 NOTE — PROGRESS NOTE ADULT - ASSESSMENT
M E D I C A L   A T T E N D I N G    F O L L O W    U P   N O T E                                     ------------------------------------------------------------------------------------------------    patient evaluated by me, case discussed with team, chart, medications, and physical exam reviewed, labs / tests  and vitals reviewed by me, as remedios.   Patient is stable for discharge today.  pt adamant to leave today   Patient to follow up with  PMD, Pulm, Psych   See discharge document for full note.      ==================>> MEDICATIONS <<====================    ALBUTerol   0.5% 2.5 milliGRAM(s) Nebulizer every 6 hours  amLODIPine   Tablet 2.5 milliGRAM(s) Oral daily  aspirin  chewable 81 milliGRAM(s) Oral daily  atorvastatin 40 milliGRAM(s) Oral at bedtime  citalopram 20 milliGRAM(s) Oral daily  docusate sodium 100 milliGRAM(s) Oral three times a day  enoxaparin Injectable 40 milliGRAM(s) SubCutaneous daily  folic acid 1 milliGRAM(s) Oral daily  gabapentin 100 milliGRAM(s) Oral every 8 hours  lactobacillus acidophilus 1 Tablet(s) Oral three times a day with meals  levothyroxine 125 MICROGram(s) Oral daily  LORazepam     Tablet 1 milliGRAM(s) Oral three times a day  losartan 100 milliGRAM(s) Oral daily  multivitamin 1 Tablet(s) Oral daily  potassium chloride   Powder 20 milliEquivalent(s) Oral once  predniSONE   Tablet 5 milliGRAM(s) Oral daily  tiotropium 18 MICROgram(s) Capsule 1 Capsule(s) Inhalation daily  torsemide 20 milliGRAM(s) Oral daily  trihexyphenidyl 2 milliGRAM(s) Oral daily    MEDICATIONS  (PRN):  acetaminophen   Tablet .. 650 milliGRAM(s) Oral every 6 hours PRN Temp greater or equal to 38C (100.4F), Mild Pain (1 - 3)  ALBUTerol    90 MICROgram(s) HFA Inhaler 2 Puff(s) Inhalation every 4 hours PRN Shortness of Breath  hydrOXYzine hydrochloride 25 milliGRAM(s) Oral every 6 hours PRN Anxiety  melatonin 3 milliGRAM(s) Oral at bedtime PRN Insomnia  oxyCODONE    IR 10 milliGRAM(s) Oral every 6 hours PRN Moderate Pain (4 - 6)    ==================>> VITAL SIGNS <<==================    T(C): 36.6 (08-20-19 @ 05:26), Max: 36.9 (08-19-19 @ 21:54)  HR: 92 (08-20-19 @ 10:08) (74 - 94)  BP: 130/58 (08-20-19 @ 05:26) (119/55 - 130/58)  RR: 20 (08-20-19 @ 05:26) (20 - 20)  SpO2: 97% (08-20-19 @ 05:26) (96% - 98%)     ==================>> LAB AND IMAGING <<==================                        10.8   7.73  )-----------( 176      ( 20 Aug 2019 07:10 )             35.8        08-20    146<H>  |  99  |  9   ----------------------------<  107<H>  3.4<L>   |  34<H>  |  0.59    Ca    10.1      20 Aug 2019 07:10  Phos  3.6     08-20  Mg     1.9     08-20    TPro  6.0  /  Alb  3.3  /  TBili  0.5  /  DBili  x   /  AST  14  /  ALT  11  /  AlkPhos  81  08-19       TSH:      2.15   (08-18-19)           Lipid profile:  (08-18-19)     Total: 140     LDL  : 64     HDL  :63     TG   :85     HgA1C:   WBC count:   7.73 <<== ,  6.87 <<== ,  7.77 <<== ,  13.34 <<==   Hemoglobin:   10.8 <<==,  9.8 <<==,  9.9 <<==,  10.8 <<==  platelets:  176 <==, 163 <==, 162 <==, 203 <==    Creatinine:  0.59  <<==, 0.61  <<==, 0.61  <<==, 0.69  <<==  Sodium:   146  <==, 143  <==, 141  <==, 141  <==       AST:          14 <== , 16 <== , 17 <==      ALT:        11  <== , 12  <== , 14  <==      AP:        81  <=, 81  <=, 99  <=     Bili:        0.5  <=, 0.6  <=, 0.7  <=

## 2019-08-20 NOTE — PROGRESS NOTE ADULT - REASON FOR ADMISSION
increased swelling of legs

## 2019-08-20 NOTE — PROGRESS NOTE ADULT - PROBLEM SELECTOR PROBLEM 1
Venous insufficiency of both lower extremities

## 2019-08-20 NOTE — PROGRESS NOTE ADULT - SUBJECTIVE AND OBJECTIVE BOX
Patient is a 68y old  Female who presents with a chief complaint of increased swelling of legs (20 Aug 2019 14:44)      Vascular Surgery Attending Progress Note    Interval HPI: pt was seen at bedside by me at 830am   pt w/o new c/o     Medications:  acetaminophen   Tablet .. 650 milliGRAM(s) Oral every 6 hours PRN  ALBUTerol    90 MICROgram(s) HFA Inhaler 2 Puff(s) Inhalation every 4 hours PRN  ALBUTerol   0.5% 2.5 milliGRAM(s) Nebulizer every 6 hours  amLODIPine   Tablet 2.5 milliGRAM(s) Oral daily  aspirin  chewable 81 milliGRAM(s) Oral daily  atorvastatin 40 milliGRAM(s) Oral at bedtime  citalopram 20 milliGRAM(s) Oral daily  docusate sodium 100 milliGRAM(s) Oral three times a day  enoxaparin Injectable 40 milliGRAM(s) SubCutaneous daily  folic acid 1 milliGRAM(s) Oral daily  gabapentin 100 milliGRAM(s) Oral every 8 hours  hydrOXYzine hydrochloride 25 milliGRAM(s) Oral every 6 hours PRN  lactobacillus acidophilus 1 Tablet(s) Oral three times a day with meals  levothyroxine 125 MICROGram(s) Oral daily  LORazepam     Tablet 1 milliGRAM(s) Oral three times a day  losartan 100 milliGRAM(s) Oral daily  melatonin 3 milliGRAM(s) Oral at bedtime PRN  multivitamin 1 Tablet(s) Oral daily  oxyCODONE    IR 10 milliGRAM(s) Oral every 6 hours PRN  predniSONE   Tablet 5 milliGRAM(s) Oral daily  tiotropium 18 MICROgram(s) Capsule 1 Capsule(s) Inhalation daily  torsemide 20 milliGRAM(s) Oral daily  trihexyphenidyl 2 milliGRAM(s) Oral daily      Vital Signs Last 24 Hrs  T(C): 36.6 (20 Aug 2019 05:26), Max: 36.9 (19 Aug 2019 21:54)  T(F): 97.9 (20 Aug 2019 05:26), Max: 98.5 (19 Aug 2019 21:54)  HR: 88 (20 Aug 2019 16:11) (74 - 94)  BP: 130/58 (20 Aug 2019 05:26) (119/55 - 130/58)  BP(mean): --  RR: 20 (20 Aug 2019 05:26) (20 - 20)  SpO2: 97% (20 Aug 2019 05:26) (96% - 98%)  I&O's Summary      Physical Exam:  Neuro  A&Ox3 VSS  Vascular:  mau le mod dec in edema w mau ace wraps     LABS:                        10.8   7.73  )-----------( 176      ( 20 Aug 2019 07:10 )             35.8     08-20    146<H>  |  99  |  9   ----------------------------<  107<H>  3.4<L>   |  34<H>  |  0.59    Ca    10.1      20 Aug 2019 07:10  Phos  3.6     08-20  Mg     1.9     08-20    TPro  6.0  /  Alb  3.3  /  TBili  0.5  /  DBili  x   /  AST  14  /  ALT  11  /  AlkPhos  81  08-19        MARJ RODRIGUEZ MD  [phone]

## 2019-08-20 NOTE — PROGRESS NOTE ADULT - PROBLEM SELECTOR PROBLEM 2
Varicose veins of bilateral lower extremities with other complications

## 2019-08-20 NOTE — PROGRESS NOTE ADULT - ASSESSMENT
69 yo woman with a hx of COPD, breast CA, kidney CA in remission, NOEMY, anxiety, HTN, presenting with bilateral LE edema.    - continue leg elevation and  ace wraps  clinically improving   f/u as outpt

## 2019-08-20 NOTE — DISCHARGE NOTE PROVIDER - NSDCFUADDAPPT_GEN_ALL_CORE_FT
***You must follow up with your outpatient pulmonologist within 1-2 weeks for further management of your asthma/COPD.  **Follow up at [facility] walk in clinic in 1-2 weeks: [phone], call to make appt.

## 2019-08-20 NOTE — DISCHARGE NOTE PROVIDER - CARE PROVIDER_API CALL
Juan Salcedo)  Internal Medicine  [address] [address] [place] [address]  Phone: [phone]  Fax: [phone]  Follow Up Time: 1 week    Siva Angel)  Vascular Surgery  [address] [facility], [address] [address]  Phone: [phone]  Fax: [phone]  Follow Up Time:

## 2019-08-20 NOTE — DISCHARGE NOTE PROVIDER - NSDCHHNEEDSERVICE_GEN_ALL_CORE
Teaching and training/Other, specify.../Observation and assessment/Rehabilitation services/Medication teaching and assessment

## 2019-08-20 NOTE — OCCUPATIONAL THERAPY INITIAL EVALUATION ADULT - DIAGNOSIS, OT EVAL
decreased functional mobility, decreased ADL independence, decreased functional mobility decreased balance, decreased functional mobility, decreased ADL independence

## 2019-08-20 NOTE — DISCHARGE NOTE NURSING/CASE MANAGEMENT/SOCIAL WORK - NSDCDPATPORTLINK_GEN_ALL_CORE
You can access the [facility] Patient Portal, offered by [facility], by registering with the following website: [email or web address]

## 2019-08-20 NOTE — OCCUPATIONAL THERAPY INITIAL EVALUATION ADULT - LIVES WITH, PROFILE
Pt lives with  and son in co-op with no steps to manage. Pt has a walk-in shower with no current DME.

## 2019-08-20 NOTE — DISCHARGE NOTE PROVIDER - HOSPITAL COURSE
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        Cellulitis of LLE     - leg swelling as above with leukocytosis, tender to tough, warmth, redness improved     - likely early left sided cellulitis due to increased edema due to incompliance with diuretics and venous insufficiency     - vascular surgery consulted, --recommend no urgent surgical intervention at this time, keep LE elevated and wrapped    - ID consulted for positive UA, s/p clindamycin- hold antibiotics and observe for now, c/w bacid      - c/w torsemide for diuresis, pain control with Oxy 10mg PO, supportive care     - B/L duplex (8/17)- no DVT        Leg swelling    -likely with venous insufficiency and incompliance with meds / diuretics at home    -leg wrapping and elevation     -vascular recs appreciated         R/O ACS     -elevated trops, CK wnl, BNP wnl; CXR (8/17)- clear    -cardiology consulted - lipid panel- wnl; ASA/statin, resumed torsemide 20mg QD, no further workup needed, decreased prednisone to 5mg QD (at home 10mg)    -recent had cardiac workup        Anxiety.    -psych consulted- c/w celexa 20mg QD (at home on 10mg); ativan 1mg TID (at home QID); prn atarax 25mg q6h for anxiety, ok for DC    -EKG (8/17)- NSR, qtc- 422ms    -NO CAPACITY to leave AMA    -close Psych f/u outpatient --[facility] walk in clinic [phone]        COPD     -on prednisone 10 mg at home > changed to 5 mg given edema and stability.     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        Cellulitis of LLE     - leg swelling as above with leukocytosis, tender to tough, warmth, redness improved     - likely early left sided cellulitis due to increased edema due to incompliance with diuretics and venous insufficiency     - vascular surgery consulted, --recommend no urgent surgical intervention at this time, keep LE elevated and wrapped with ACE bandage    - ID consulted for positive UA, s/p clindamycin- hold antibiotics and observe for now, c/w bacid      - c/w torsemide for diuresis, pain control with Oxy 10mg PO, supportive care     - B/L duplex (8/17)- no DVT        Leg swelling    -likely with venous insufficiency and incompliance with meds / diuretics at home    -leg wrapping and elevation     -vascular recs appreciated         R/O ACS     -elevated trops, CK wnl, BNP wnl; CXR (8/17)- clear    -cardiology consulted - lipid panel- wnl; ASA/statin, resumed torsemide 20mg QD, no further workup needed, decreased prednisone to 5mg QD (at home 10mg)    -recent had cardiac workup        Anxiety.    -psych consulted- c/w celexa 20mg QD (at home on 10mg); ativan 1mg TID (at home QID); prn atarax 25mg q6h for anxiety, ok for DC    -EKG (8/17)- NSR, qtc- 422ms    -NO CAPACITY to leave AMA    -close Psych f/u outpatient --[facility] walk in clinic [phone]        COPD     -on prednisone 10 mg at home > changed to 5 mg given edema and stability.     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 B/L Duplex- negative for DVT. No vascular intervention, keep bilateral legs wrapped and elevated. S/P Clinda, afebrile currently, no further ABx needed upon discharge as per ID. Pt seen by Psych, medication adjustment, cleared for discharge. Pt to F/U with her outpatient PCP Dr. Salcedo within 1 week, and Pulmonologist, and Psych follow up.        Cellulitis of LLE     -leg swelling as above with leukocytosis, tender to tough, warmth, redness improved     -likely early left sided cellulitis due to increased edema due to incompliance with diuretics and venous insufficiency     -vascular surg eval- no urgent surgical intervention, keep LE elevated and wrapped    -ID consulted - +UA and elevated WBC upon admission-> now normalized, no obvious cellulitic changes noted, monitor off abx    -S/P Clinda- hold and observe for now, c/w bacid; no abx needed upon DC    -c/w torsemide for diuresis, pain control with Oxy 10mg PO, supportive care     -B/L duplex (8/17)- no DVT     -Cards following        Leg swelling    -likely with venous insufficiency and incompliance with meds / diuretics at home    -leg wrapping and elevation     -vascular recs appreciated         R/O ACS     -elevated trops, CK wnl, BNP wnl, lipid panel- wnl    -Ekg without acute ischemic changes and CE at baseline    -CXR (8/17)- clear    -Cardiology consulted - recent TTE  without any acute findings. notably amlodipine and prednisone can be contributing to LE edema, will likely transition to alternative antiphypertensive as outpatient. Continue with lower dose predisone 5mg QD, amlodipine 2.5mg QD, ASA/statin, c/w home torsemide 20mg QD upon discharge    -defer further ischemic workup at this time,     -Cards- will consider CTA in future if patient agreeable. F/U with her PCP Dr. Salcedo         Anxiety.    -psych consulted- c/w celexa 20mg QD (at home on 10mg); ativan 1mg TID (at home QID); prn atarax 25mg q6h for anxiety    -EKG (8/17)- NSR, qtc- 422ms    -DC meds per Dr. Fajardo- continue Celexa 20mg QD, and home dose Ativan 1mg QID, no need for artane or atarax.    -close Psych f/u outpatient --[facility] walk in clinic [phone]        COPD     -on prednisone 10 mg at home > changed to 5 mg given edema and stability.     -c/w inhalers, dounebs, IS    -outpatient Pulm f/u within 1-2 weeks; to c/w prednisone 5mg upon discharge per Cards        Hypertension    -continue home medication with hold parameters: amlodipine, losartan    -Cards recs- no further w/u at this time         Asthma    -stable, as above    -c/w home meds/inhalers upon discharge, and supplemental oxygen as needed        Hyperlipidemia    -+FLP    -lipid panel wnl, c/w atorvastatin 40mg QD        DVT ppx: lovenox        PT/OT recs: rehab; however patient refusing- DC to home with Home PT/OT, VN        DISPO: Home with PT/OT/RN        8/20-  VSS, afebrile, WBC wnl. Pt with no c/o chest pain, headache, dizziness, chills, vision changes. Intermittent WOB 2/2 anxiety, improved after nebulizer/oxygen treatments. Patient is medically cleared and optimized for discharge today to home with services. Discussed plan of care with Dr. Welch, Dr. Fajardo, SW/CM and patient. As per ID- no further ABx needed upon discharge. Pt to F/U with her outpatient PCP Dr. Salcedo within 1 week, Pulmonologist, and Psych follow up. 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 B/L Duplex- negative for DVT. No vascular intervention, keep bilateral legs wrapped and elevated. S/P Clinda, afebrile currently, no further ABx needed upon discharge as per ID. Pt seen by Psych, medication adjustment, cleared for discharge. Pt to F/U with her outpatient PCP Dr. Salcedo within 1 week, and Pulmonologist, and Psych follow up.        Cellulitis of LLE     -leg swelling as above with leukocytosis, tender to tough, warmth, redness improved     -likely early left sided cellulitis due to increased edema due to incompliance with diuretics and venous insufficiency     -vascular surg eval- no urgent surgical intervention, keep LE elevated and wrapped    -ID consulted - +UA and elevated WBC upon admission-> now normalized, no obvious cellulitic changes noted, monitor off abx    -S/P Clinda- hold and observe for now, c/w bacid; no abx needed upon DC    -c/w torsemide for diuresis, pain control with Oxy 10mg PO, supportive care     -B/L duplex (8/17)- no DVT     -Cards following        Leg swelling    -likely with venous insufficiency and incompliance with meds / diuretics at home    -leg wrapping and elevation     -vascular recs appreciated         R/O ACS     -elevated trops, CK wnl, BNP wnl, lipid panel- wnl    -Ekg without acute ischemic changes and CE at baseline    -CXR (8/17)- clear    -Cardiology consulted - recent TTE  without any acute findings. notably amlodipine and prednisone can be contributing to LE edema, will likely transition to alternative antiphypertensive as outpatient. Continue with lower dose predisone 5mg QD, amlodipine 2.5mg QD, ASA/statin, c/w home torsemide 20mg QD upon discharge    -defer further ischemic workup at this time,     -Cards- will consider CTA in future if patient agreeable. F/U with her PCP Dr. Salcedo         Anxiety.    -psych consulted- c/w celexa 20mg QD (at home on 10mg); ativan 1mg TID (at home QID); prn atarax 25mg q6h for anxiety    -EKG (8/17)- NSR, qtc- 422ms    -DC meds per Dr. Fajardo- continue Celexa 20mg QD, and home dose Ativan 1mg QID, no need for artane or atarax.    -close Psych f/u outpatient --[facility] walk in clinic [phone]        COPD     -on prednisone 10 mg at home > changed to 5 mg given edema and stability.     -c/w inhalers, dounebs, IS    -outpatient Pulm f/u within 1-2 weeks; to c/w prednisone 5mg upon discharge per Cards        Hypertension    -continue home medication with hold parameters: amlodipine, losartan    -Cards recs- no further w/u at this time         Asthma    -stable, as above    -c/w home meds/inhalers upon discharge, and supplemental oxygen as needed        Hyperlipidemia    -+FLP    -lipid panel wnl, c/w atorvastatin 40mg QD        Hypothyroidism    -c/w synthyroid        DVT ppx: lovenox        PT/OT recs: rehab; however patient refusing- DC to home with Home PT/OT, VN        DISPO: Home with PT/OT/RN        8/20-  VSS, afebrile, WBC wnl. Pt with no c/o chest pain, headache, dizziness, chills, vision changes. Intermittent WOB 2/2 anxiety, improved after nebulizer/oxygen treatments. Patient is medically cleared and optimized for discharge today to home with services. Discussed plan of care with Dr. Welch, Dr. Fajardo, SW/CM and patient. As per ID- no further ABx needed upon discharge. Pt to F/U with her outpatient PCP Dr. Salcedo within 1 week, Pulmonologist, and Psych follow up. 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 B/L Duplex- negative for DVT. No vascular intervention, keep bilateral legs wrapped and elevated. S/P Clinda, afebrile currently, no further ABx needed upon discharge as per ID. Pt seen by Psych, medication adjustment, cleared for discharge. Pt to F/U with her outpatient PCP Dr. Salcedo within 1 week, and Pulmonologist, and Psych follow up.        Cellulitis of LLE     -leg swelling as above with leukocytosis, tender to tough, warmth, redness improved     -likely early left sided cellulitis due to increased edema due to incompliance with diuretics and venous insufficiency     -vascular surg eval- no urgent surgical intervention, keep LE elevated and wrapped    -ID consulted - +UA and elevated WBC upon admission-> now normalized, no obvious cellulitic changes noted, monitor off abx    -S/P Clinda- hold and observe for now, c/w bacid; no abx needed upon DC    -c/w torsemide for diuresis, pain control with Oxy 10mg PO, supportive care     -B/L duplex (8/17)- no DVT     -Cards following        Leg swelling    -likely with venous insufficiency and incompliance with meds / diuretics at home    -leg wrapping and elevation     -vascular recs appreciated         R/O ACS     -elevated trops, CK wnl, BNP wnl, lipid panel- wnl    -Ekg without acute ischemic changes and CE at baseline    -CXR (8/17)- clear    -Cardiology consulted - recent TTE  without any acute findings. notably amlodipine and prednisone can be contributing to LE edema, will likely transition to alternative antiphypertensive as outpatient. Continue with lower dose predisone 5mg QD, amlodipine 2.5mg QD, ASA/statin, c/w home torsemide 20mg QD upon discharge    -defer further ischemic workup at this time,     -Cards- will consider CTA in future if patient agreeable. F/U with her PCP Dr. Salcedo         Anxiety.    -psych consulted- c/w celexa 20mg QD (at home on 10mg); ativan 1mg TID (at home QID); prn atarax 25mg q6h for anxiety    -EKG (8/17)- NSR, qtc- 422ms    -DC meds per Dr. Fajardo- continue Celexa 20mg QD, and home dose Ativan 1mg QID, no need for artane or atarax.    -close Psych f/u outpatient --[facility] walk in clinic [phone]        COPD     -on prednisone 10 mg at home > changed to 5 mg given edema and stability.     -c/w inhalers, dounebs, IS    -outpatient Pulm f/u within 1-2 weeks; to c/w prednisone 5mg upon discharge per Cards        Hypertension    -continue home medication with hold parameters: amlodipine, losartan    -Cards recs- no further w/u at this time         Asthma    -stable, as above    -c/w home meds/inhalers upon discharge, and supplemental oxygen as needed        Hyperlipidemia    -+FLP    -lipid panel wnl, c/w atorvastatin 40mg QD        Hypothyroidism    -c/w synthyroid        DVT ppx: lovenox        PT/OT recs: rehab; however patient refusing- DC to home with Home PT/OT, VN        DISPO: Home with PT/OT/RN        8/20-  VSS, afebrile, WBC wnl. Pt with no c/o chest pain, headache, dizziness, chills, vision changes. Intermittent WOB 2/2 anxiety, improved after nebulizer/oxygen treatments. Patient is medically cleared and optimized for discharge today to home with services. Discussed plan of care with Dr. Welch, Dr. Fajardo, SW/CM and patient. As per ID- no further ABx needed upon discharge. Pt to F/U with her outpatient PCP Dr. Salcedo within 1 week, Pulmonologist, and Psych follow up.

## 2019-08-26 NOTE — BEHAVIORAL HEALTH ASSESSMENT NOTE - ABUSE / TRAUMA HISTORY
Diffuse osteolytic lesions concerning for multiple myeloma. Also concern for metastasis though primary cancer not yet known, labs most c/w MM at this time. MRI showing moderate central spinal canal stenosis and prominent T8 and C7 lesions. BMB with heme onc yesterday  -Day 1/5 of radiation therapy with RadOnc  -Elevated B2 Microglobulin to 3.3, K/L ratio elevated   -SPEP/UPEP showing gamma spike, c/w MM  -Serum viscosity slightly elevated  -Per private Heme/Onc patient had BMB in February c/w smoldering myeloma--10-15% Plasma cells w/o end organ damage. Anemia at that time c/w NANDINI No

## 2019-09-10 ENCOUNTER — EMERGENCY (EMERGENCY)
Facility: HOSPITAL | Age: 69
LOS: 1 days | Discharge: HOME | End: 2019-09-10
Attending: STUDENT IN AN ORGANIZED HEALTH CARE EDUCATION/TRAINING PROGRAM | Admitting: STUDENT IN AN ORGANIZED HEALTH CARE EDUCATION/TRAINING PROGRAM
Payer: MEDICARE

## 2019-09-10 VITALS
OXYGEN SATURATION: 100 % | HEART RATE: 91 BPM | SYSTOLIC BLOOD PRESSURE: 157 MMHG | RESPIRATION RATE: 22 BRPM | DIASTOLIC BLOOD PRESSURE: 69 MMHG

## 2019-09-10 DIAGNOSIS — Z98.89 OTHER SPECIFIED POSTPROCEDURAL STATES: Chronic | ICD-10-CM

## 2019-09-10 DIAGNOSIS — Z90.5 ACQUIRED ABSENCE OF KIDNEY: Chronic | ICD-10-CM

## 2019-09-10 DIAGNOSIS — Z90.49 ACQUIRED ABSENCE OF OTHER SPECIFIED PARTS OF DIGESTIVE TRACT: Chronic | ICD-10-CM

## 2019-09-10 PROCEDURE — 99283 EMERGENCY DEPT VISIT LOW MDM: CPT | Mod: 25

## 2019-09-10 PROCEDURE — 93010 ELECTROCARDIOGRAM REPORT: CPT

## 2019-09-10 PROCEDURE — 71046 X-RAY EXAM CHEST 2 VIEWS: CPT | Mod: 26

## 2019-09-10 RX ORDER — ALBUTEROL 90 UG/1
2.5 AEROSOL, METERED ORAL ONCE
Refills: 0 | Status: COMPLETED | OUTPATIENT
Start: 2019-09-10 | End: 2019-09-10

## 2019-09-10 RX ADMIN — Medication 50 MILLIGRAM(S): at 11:48

## 2019-09-10 RX ADMIN — ALBUTEROL 2.5 MILLIGRAM(S): 90 AEROSOL, METERED ORAL at 11:47

## 2019-09-10 RX ADMIN — Medication 0.5 MILLIGRAM(S): at 12:40
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
## 2019-09-10 NOTE — ED PROVIDER NOTE - PATIENT PORTAL LINK FT
You can access the FollowMyHealth Patient Portal offered by [facility] by registering at the following website: [email or web address]. By joining [facility]’s FollowMyHealth portal, you will also be able to view your health information using other applications (apps) compatible with our system.

## 2019-09-10 NOTE — ED PROVIDER NOTE - PHYSICAL EXAMINATION
Physical Exam:  Gen: Patient appears very anxious at bedside, in tears, AOx3, non-toxic appearing   Head: NCAT  HEENT: PEERL, normal conjunctiva, tongue midline, oral mucosa moist  Lung: CTAB, no respiratory distress, no wheezes/rhonchi/rales B/L, speaking in full sentences  CV: RRR, no murmurs, rubs or gallops  Abd: soft, NT, ND, no guarding, no rigidity, no rebound tenderness  MSK: 1+ pitting edema B/L LE, no visible deformities, ROM normal in UE/LE  Neuro: No focal sensory or motor deficits  Skin: Warm, well perfused, no rash   Psych: anxious, in tears

## 2019-09-10 NOTE — ED PROVIDER NOTE - NSFOLLOWUPINSTRUCTIONS_ED_ALL_ED_FT
Asthma    Asthma is a condition in which the airways tighten and narrow, making it difficult to breath. Asthma episodes, also called asthma attacks, range from minor to life-threatening. Symptoms include wheezing, coughing, chest tightness, or shortness of breath. The diagnosis of asthma is made by a review of your medical history and a physical exam, but may involve additional testing. Asthma cannot be cured, but medicines and lifestyle changes can help control it. Avoid triggers of asthma which may include animal dander, pollen, mold, smoke, air pollutants, etc.     SEEK IMMEDIATE MEDICAL CARE IF YOU HAVE ANY OF THE FOLLOWING SYMPTOMS: worsening of symptoms, shortness of breath at rest, chest pain, bluish discoloration to lips or fingertips, lightheadedness/dizziness, or fever.     - Lab and imaging results, if performed, were discussed with you along with your discharge diagnosis    - Follow up with your doctor in 1 week - bring copies of your results if you were given. If you do not have a primary doctor, please call [phone] to find one convenient for you    - Return to the ED for any new, worsening, or concerning symptoms to you    - Continue all prescribed medications    - Take ibuprofen/tylenol as directed as needed for pain    - Rest and keep yourself hydrated with fluids

## 2019-09-10 NOTE — ED PROVIDER NOTE - ATTENDING CONTRIBUTION TO CARE
multiple nebs pta   states feels likel asthma  ekg unchanged   cxr  lungs wnl  will reassess multiple nebs pta   states symptoms feels like asthma  ekg unchanged   cxr  lungs wnl  will reassess

## 2019-09-10 NOTE — ED PROVIDER NOTE - NS ED ROS FT
CONSTITUTIONAL: No fevers, no chills, no lightheadedness, no dizziness  Eyes: no visual changes  Nose: no nasal congestion  Mouth/Throat: no sore throat  CV: No chest pain, no palpitations  PULM: +SOB, no cough  GI: No n/v/d, no abd pain  : no dysuria, no hematuria  SKIN: no rashes  NEURO: no headache, no focal weakness or numbness   Psych: Endorses anxiety

## 2019-09-10 NOTE — ED PROVIDER NOTE - OBJECTIVE STATEMENT
Patient is a 68y F w. PMHx HTN, HLD, Asthma, Hypothyroidism p/w SOB this morning while at her son's surgery. Patient states she took 3 neb treatments while waiting for ambulance to bring her to the ED. At bedside, not endorsing SOB, states she is feeling anxious from both her son's surgery, wants to get back, and anxious being in the hospital. Denies fevers, chills, cough, chest pain, n/v. Patient is following new pulmonologist who is she is seeing next week who will evaluate her SOB cause either asthma or anxiety. Patient states she has never been intubated or hospitalized for her asthma.     While examining patient,  called stating patient will constantly take her nebulizer treatments due to anxiety, stating "she needs psych to see her"

## 2019-09-10 NOTE — ED ADULT TRIAGE NOTE - CHIEF COMPLAINT QUOTE
Pt sent from outpatient facility as a visitor. pt son was having a procedure. Pt states that she is having a asthma attack. No resp distress noted. Pt took 2 albuterol treatments. Pt seems anxious.

## 2019-09-10 NOTE — ED ADULT NURSE NOTE - OBJECTIVE STATEMENT
pt AOX 3 stated called 911 for SOB anxious, crying stating her son is having surgery and she started having SOB denies CP. dizziness, pt stated wanted to go home, lungs clear, oxygen Sat. 97-98% Room air, pt morbid obese, w/c bound, incontinence of urine, skin intact, medicated as ordered.   pending dispo.  Theresa Omalley RN

## 2019-09-10 NOTE — ED PROVIDER NOTE - CLINICAL SUMMARY MEDICAL DECISION MAKING FREE TEXT BOX
Hx of Asthma, p/w SOB while at son's surgery s/p 3 neb treatments, not endorsing SOB at bedside, lungs CTAB, likely asthma vs. anxiety. Unlikely pneumonia, pt afebrile, denies cough. Will obtain CXR, EKG, reassess.

## 2019-09-11 ENCOUNTER — INPATIENT (INPATIENT)
Facility: HOSPITAL | Age: 69
LOS: 1 days | Discharge: HOME HEALTH | End: 2019-09-13
Attending: GENERAL PRACTICE | Admitting: GENERAL PRACTICE
Payer: MEDICARE

## 2019-09-11 VITALS
SYSTOLIC BLOOD PRESSURE: 153 MMHG | OXYGEN SATURATION: 98 % | RESPIRATION RATE: 18 BRPM | DIASTOLIC BLOOD PRESSURE: 90 MMHG | TEMPERATURE: 98 F | HEART RATE: 95 BPM

## 2019-09-11 DIAGNOSIS — Z98.89 OTHER SPECIFIED POSTPROCEDURAL STATES: Chronic | ICD-10-CM

## 2019-09-11 DIAGNOSIS — Z29.9 ENCOUNTER FOR PROPHYLACTIC MEASURES, UNSPECIFIED: ICD-10-CM

## 2019-09-11 DIAGNOSIS — E78.5 HYPERLIPIDEMIA, UNSPECIFIED: ICD-10-CM

## 2019-09-11 DIAGNOSIS — Z90.49 ACQUIRED ABSENCE OF OTHER SPECIFIED PARTS OF DIGESTIVE TRACT: Chronic | ICD-10-CM

## 2019-09-11 DIAGNOSIS — J45.909 UNSPECIFIED ASTHMA, UNCOMPLICATED: ICD-10-CM

## 2019-09-11 DIAGNOSIS — E05.90 THYROTOXICOSIS, UNSPECIFIED WITHOUT THYROTOXIC CRISIS OR STORM: ICD-10-CM

## 2019-09-11 DIAGNOSIS — R07.89 OTHER CHEST PAIN: ICD-10-CM

## 2019-09-11 DIAGNOSIS — Z90.5 ACQUIRED ABSENCE OF KIDNEY: Chronic | ICD-10-CM

## 2019-09-11 DIAGNOSIS — F22 DELUSIONAL DISORDERS: ICD-10-CM

## 2019-09-11 DIAGNOSIS — I10 ESSENTIAL (PRIMARY) HYPERTENSION: ICD-10-CM

## 2019-09-11 DIAGNOSIS — D72.829 ELEVATED WHITE BLOOD CELL COUNT, UNSPECIFIED: ICD-10-CM

## 2019-09-11 LAB
ALBUMIN SERPL ELPH-MCNC: 4 G/DL — SIGNIFICANT CHANGE UP (ref 3.3–5)
ALP SERPL-CCNC: 89 U/L — SIGNIFICANT CHANGE UP (ref 40–120)
ALT FLD-CCNC: 18 U/L — SIGNIFICANT CHANGE UP (ref 4–33)
ANION GAP SERPL CALC-SCNC: 12 MMO/L — SIGNIFICANT CHANGE UP (ref 7–14)
APAP SERPL-MCNC: < 15 UG/ML — LOW (ref 15–25)
APTT BLD: 27 SEC — LOW (ref 27.5–36.3)
AST SERPL-CCNC: 19 U/L — SIGNIFICANT CHANGE UP (ref 4–32)
BASOPHILS # BLD AUTO: 0.01 K/UL — SIGNIFICANT CHANGE UP (ref 0–0.2)
BASOPHILS NFR BLD AUTO: 0.1 % — SIGNIFICANT CHANGE UP (ref 0–2)
BILIRUB SERPL-MCNC: 0.6 MG/DL — SIGNIFICANT CHANGE UP (ref 0.2–1.2)
BUN SERPL-MCNC: 18 MG/DL — SIGNIFICANT CHANGE UP (ref 7–23)
CALCIUM SERPL-MCNC: 9.9 MG/DL — SIGNIFICANT CHANGE UP (ref 8.4–10.5)
CHLORIDE SERPL-SCNC: 97 MMOL/L — LOW (ref 98–107)
CO2 SERPL-SCNC: 28 MMOL/L — SIGNIFICANT CHANGE UP (ref 22–31)
CREAT SERPL-MCNC: 0.54 MG/DL — SIGNIFICANT CHANGE UP (ref 0.5–1.3)
EOSINOPHIL # BLD AUTO: 0.01 K/UL — SIGNIFICANT CHANGE UP (ref 0–0.5)
EOSINOPHIL NFR BLD AUTO: 0.1 % — SIGNIFICANT CHANGE UP (ref 0–6)
ETHANOL BLD-MCNC: < 10 MG/DL — SIGNIFICANT CHANGE UP
GLUCOSE SERPL-MCNC: 131 MG/DL — HIGH (ref 70–99)
HCT VFR BLD CALC: 39 % — SIGNIFICANT CHANGE UP (ref 34.5–45)
HGB BLD-MCNC: 11.5 G/DL — SIGNIFICANT CHANGE UP (ref 11.5–15.5)
IMM GRANULOCYTES NFR BLD AUTO: 0.5 % — SIGNIFICANT CHANGE UP (ref 0–1.5)
INR BLD: 1.06 — SIGNIFICANT CHANGE UP (ref 0.88–1.17)
LYMPHOCYTES # BLD AUTO: 0.86 K/UL — LOW (ref 1–3.3)
LYMPHOCYTES # BLD AUTO: 6.2 % — LOW (ref 13–44)
MCHC RBC-ENTMCNC: 26 PG — LOW (ref 27–34)
MCHC RBC-ENTMCNC: 29.5 % — LOW (ref 32–36)
MCV RBC AUTO: 88.2 FL — SIGNIFICANT CHANGE UP (ref 80–100)
MONOCYTES # BLD AUTO: 0.75 K/UL — SIGNIFICANT CHANGE UP (ref 0–0.9)
MONOCYTES NFR BLD AUTO: 5.4 % — SIGNIFICANT CHANGE UP (ref 2–14)
NEUTROPHILS # BLD AUTO: 12.19 K/UL — HIGH (ref 1.8–7.4)
NEUTROPHILS NFR BLD AUTO: 87.7 % — HIGH (ref 43–77)
NRBC # FLD: 0 K/UL — SIGNIFICANT CHANGE UP (ref 0–0)
PLATELET # BLD AUTO: 237 K/UL — SIGNIFICANT CHANGE UP (ref 150–400)
PMV BLD: 11.5 FL — SIGNIFICANT CHANGE UP (ref 7–13)
POTASSIUM SERPL-MCNC: 4.1 MMOL/L — SIGNIFICANT CHANGE UP (ref 3.5–5.3)
POTASSIUM SERPL-SCNC: 4.1 MMOL/L — SIGNIFICANT CHANGE UP (ref 3.5–5.3)
PROT SERPL-MCNC: 6.7 G/DL — SIGNIFICANT CHANGE UP (ref 6–8.3)
PROTHROM AB SERPL-ACNC: 11.8 SEC — SIGNIFICANT CHANGE UP (ref 9.8–13.1)
RBC # BLD: 4.42 M/UL — SIGNIFICANT CHANGE UP (ref 3.8–5.2)
RBC # FLD: 14.7 % — HIGH (ref 10.3–14.5)
SALICYLATES SERPL-MCNC: < 5 MG/DL — LOW (ref 15–30)
SODIUM SERPL-SCNC: 137 MMOL/L — SIGNIFICANT CHANGE UP (ref 135–145)
T3 SERPL-MCNC: 66.6 NG/DL — LOW (ref 80–200)
T4 AB SER-ACNC: 8.34 UG/DL — SIGNIFICANT CHANGE UP (ref 5.1–13)
T4 FREE SERPL-MCNC: 1.81 NG/DL — HIGH (ref 0.9–1.8)
TROPONIN T, HIGH SENSITIVITY: 45 NG/L — SIGNIFICANT CHANGE UP (ref ?–14)
TSH SERPL-MCNC: < 0.1 UIU/ML — LOW (ref 0.27–4.2)
WBC # BLD: 13.89 K/UL — HIGH (ref 3.8–10.5)
WBC # FLD AUTO: 13.89 K/UL — HIGH (ref 3.8–10.5)

## 2019-09-11 PROCEDURE — 71045 X-RAY EXAM CHEST 1 VIEW: CPT | Mod: 26

## 2019-09-11 RX ORDER — TIOTROPIUM BROMIDE 18 UG/1
1 CAPSULE ORAL; RESPIRATORY (INHALATION) DAILY
Refills: 0 | Status: DISCONTINUED | OUTPATIENT
Start: 2019-09-11 | End: 2019-09-13

## 2019-09-11 RX ORDER — AMLODIPINE BESYLATE 2.5 MG/1
2.5 TABLET ORAL DAILY
Refills: 0 | Status: DISCONTINUED | OUTPATIENT
Start: 2019-09-11 | End: 2019-09-13

## 2019-09-11 RX ORDER — IPRATROPIUM/ALBUTEROL SULFATE 18-103MCG
3 AEROSOL WITH ADAPTER (GRAM) INHALATION EVERY 6 HOURS
Refills: 0 | Status: DISCONTINUED | OUTPATIENT
Start: 2019-09-11 | End: 2019-09-13

## 2019-09-11 RX ORDER — LOSARTAN POTASSIUM 100 MG/1
100 TABLET, FILM COATED ORAL DAILY
Refills: 0 | Status: DISCONTINUED | OUTPATIENT
Start: 2019-09-11 | End: 2019-09-13

## 2019-09-11 RX ORDER — GABAPENTIN 400 MG/1
200 CAPSULE ORAL THREE TIMES A DAY
Refills: 0 | Status: DISCONTINUED | OUTPATIENT
Start: 2019-09-11 | End: 2019-09-11

## 2019-09-11 RX ORDER — ASPIRIN/CALCIUM CARB/MAGNESIUM 324 MG
81 TABLET ORAL DAILY
Refills: 0 | Status: DISCONTINUED | OUTPATIENT
Start: 2019-09-11 | End: 2019-09-13

## 2019-09-11 RX ORDER — IPRATROPIUM/ALBUTEROL SULFATE 18-103MCG
3 AEROSOL WITH ADAPTER (GRAM) INHALATION ONCE
Refills: 0 | Status: COMPLETED | OUTPATIENT
Start: 2019-09-11 | End: 2019-09-11

## 2019-09-11 RX ORDER — DOCUSATE SODIUM 100 MG
100 CAPSULE ORAL THREE TIMES A DAY
Refills: 0 | Status: DISCONTINUED | OUTPATIENT
Start: 2019-09-11 | End: 2019-09-13

## 2019-09-11 RX ORDER — GABAPENTIN 400 MG/1
200 CAPSULE ORAL THREE TIMES A DAY
Refills: 0 | Status: DISCONTINUED | OUTPATIENT
Start: 2019-09-11 | End: 2019-09-13

## 2019-09-11 RX ORDER — ATORVASTATIN CALCIUM 80 MG/1
40 TABLET, FILM COATED ORAL AT BEDTIME
Refills: 0 | Status: DISCONTINUED | OUTPATIENT
Start: 2019-09-11 | End: 2019-09-13

## 2019-09-11 RX ORDER — CITALOPRAM 10 MG/1
20 TABLET, FILM COATED ORAL DAILY
Refills: 0 | Status: DISCONTINUED | OUTPATIENT
Start: 2019-09-11 | End: 2019-09-13

## 2019-09-11 RX ORDER — HEPARIN SODIUM 5000 [USP'U]/ML
5000 INJECTION INTRAVENOUS; SUBCUTANEOUS EVERY 8 HOURS
Refills: 0 | Status: DISCONTINUED | OUTPATIENT
Start: 2019-09-11 | End: 2019-09-13

## 2019-09-11 RX ORDER — HALOPERIDOL DECANOATE 100 MG/ML
2 INJECTION INTRAMUSCULAR ONCE
Refills: 0 | Status: COMPLETED | OUTPATIENT
Start: 2019-09-11 | End: 2019-09-11

## 2019-09-11 RX ADMIN — HALOPERIDOL DECANOATE 2 MILLIGRAM(S): 100 INJECTION INTRAMUSCULAR at 14:54

## 2019-09-11 RX ADMIN — Medication 1 MILLIGRAM(S): at 11:07

## 2019-09-11 RX ADMIN — Medication 3 MILLILITER(S): at 11:22

## 2019-09-11 RX ADMIN — Medication 1 MILLIGRAM(S): at 14:54

## 2019-09-11 RX ADMIN — Medication 1 MILLIGRAM(S): at 14:07

## 2019-09-11 RX ADMIN — Medication 1 MILLIGRAM(S): at 19:18

## 2019-09-11 NOTE — H&P ADULT - ASSESSMENT
Pt is a 68 y.o female with pmhx of COPD, Asthma, DVT, Anxiety, Breast/renal CA presenting from home to the ER by ambulance. per patient , her  called the ambulance because she was taking too much medication and he held it from her today. She says she lives with her  and son. Has been seen in the past with similar complaints regarding , has seen Pysch and social work. Social work saw her yesterday and pt declined any services from them. As per patient, she has been having chest discomfort. denies of nay SOB, palpitation, SOB, nausea, vomiting. patient was recently admitted fro LE cellulitis

## 2019-09-11 NOTE — H&P ADULT - PROBLEM SELECTOR PLAN 3
low TSH level  on thyroid supplement  unknown compliance   will hold levothyroxin for now   monitor vitals   last TSH from may in normal limit mild leukocytosis   afebrile can be reactive   monitor for fever  hold Abx for now

## 2019-09-11 NOTE — ED ADULT TRIAGE NOTE - CHIEF COMPLAINT QUOTE
Per EMS pt's  stated that pt had all of her pill bottles around her this morning, pt denies this states that her  has [place], and is making her sick.  Pt also states that she is having difficulty breathing and she is "dying of thirst".  Pt denies taking any extra doses of her medicine, states that her  just wants her out of the house.  PMH asthma, HTN. HLD, DVT, anxiety, COPD, NOEMY

## 2019-09-11 NOTE — ED PROVIDER NOTE - PROGRESS NOTE DETAILS
JOHNATHON Akhtar: Spoke with patients  Panfilo [phone]. States patient has been taking her medication more than she is supposed to. States that she uses her nebulizer > 20 times a day and constantly states she needs it. He called 911 this morning as patient constantly asking for more Ativan, oxycodone and her nebs. States she was admitted to [facility] in 2014 for Depression. States she always claims her asthma is acting up and needs a nebulizer. States she blames everything on him. Feels he is unable to safely care for her at home. Spoke with Psych states will require medicine admission, labs ordered. pending admission. kimberly calvillo: called and spoke with Dr. Welch who had patient on her last admission. familiar with patient and accepted her to his service. discussed visit/labs/imaging and psych to follow. kimberly calvillo: pt continuing to yell , given ativan/haldol as discussed with psych. called and spoke with Dr. Welch who had patient on her last admission. familiar with patient and accepted her to his service. discussed visit/labs/imaging and psych to follow.

## 2019-09-11 NOTE — H&P ADULT - PROBLEM SELECTOR PLAN 4
Nebs abd inhalers as tolerated   O2 supplement low TSH level  on thyroid supplement  unknown compliance   will hold levothyroxin for now   monitor vitals   last TSH from may in normal limit

## 2019-09-11 NOTE — H&P ADULT - NSHPPHYSICALEXAM_GEN_ALL_CORE
Vital Signs Last 24 Hrs  T(C): 36.7 (11 Sep 2019 16:00), Max: 36.7 (11 Sep 2019 09:39)  T(F): 98 (11 Sep 2019 16:00), Max: 98 (11 Sep 2019 09:39)  HR: 72 (11 Sep 2019 16:00) (68 - 95)  BP: 136/56 (11 Sep 2019 16:00) (135/51 - 153/90)  BP(mean): --  RR: 18 (11 Sep 2019 16:00) (18 - 18)  SpO2: 100% (11 Sep 2019 16:00) (98% - 100%)    Appearance: awake, drowsy, following commands, NADx3, obese	  HEENT:   Normal oral mucosa, PERRL, EOMI	  Lymphatic: No lymphadenopathy , no edema  Cardiovascular: Normal S1 S2, No JVD  Respiratory: Lungs clear to auscultation, normal effort 	  Gastrointestinal:  Soft, Non-tender, + BS	  Skin: No rashes, No ecchymoses, No cyanosis, warm to touch  Musculoskeletal: Normal range of motion, normal strength  Psychiatry:  Mood & affect appropriate  Ext: LE swelling
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
## 2019-09-11 NOTE — ED ADULT NURSE NOTE - NSIMPLEMENTINTERV_GEN_ALL_ED
Implemented All Universal Safety Interventions:  [place] to call system. Call bell, personal items and telephone within reach. Instruct patient to call for assistance. Room bathroom lighting operational. Non-slip footwear when patient is off stretcher. Physically safe environment: no spills, clutter or unnecessary equipment. Stretcher in lowest position, wheels locked, appropriate side rails in place.

## 2019-09-11 NOTE — ED PROVIDER NOTE - PHYSICAL EXAMINATION
Attending/Stacy: +kyphotic, PERRL/EOMI, no JVD, RRR, CTAB, Abd-soft, NT/ND, no LE edema, A&Ox3, +anxious, no suicidal ideation; Skin Anterior LE ecchymosis, various stages of healing

## 2019-09-11 NOTE — ED ADULT NURSE REASSESSMENT NOTE - NS ED NURSE REASSESS COMMENT FT1
MAR made aware of pts HR being bradycardic. MAR stated to monitor pt at present time. Report given to ESSU 2 RN, Pt being transported to ESSU 2 at present time.
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
## 2019-09-11 NOTE — ED PROVIDER NOTE - CLINICAL SUMMARY MEDICAL DECISION MAKING FREE TEXT BOX
A/P 69 yo F w/ mult med problems non ambulatory, unclear call to 911  -anxiety treatment, contact  for additional details, labs

## 2019-09-11 NOTE — H&P ADULT - ATTENDING COMMENTS
Advanced care planning was discussed with patient.  Advanced care planning forms were reviewed and discussed.  Differential diagnosis and plan of care discussed with patient after the evaluation.   Pain assessed and judicious use of narcotics when appropriate was discussed.

## 2019-09-11 NOTE — ED ADULT NURSE REASSESSMENT NOTE - NS ED NURSE REASSESS COMMENT FT1
Received report from ADDI Ely. Pt calm and cooperative at present time. Repeat labs collected and sent. Will continue to monitor.

## 2019-09-11 NOTE — H&P ADULT - NSHPLABSRESULTS_GEN_ALL_CORE
11.5   13.89 )-----------( 237      ( 11 Sep 2019 13:20 )             39.0               09-11    137  |  97<L>  |  18  ----------------------------<  131<H>  4.1   |  28  |  0.54    Ca    9.9      11 Sep 2019 13:20    TPro  6.7  /  Alb  4.0  /  TBili  0.6  /  DBili  x   /  AST  19  /  ALT  18  /  AlkPhos  89  09-11    PT/INR - ( 11 Sep 2019 13:20 )   PT: 11.8 SEC;   INR: 1.06          PTT - ( 11 Sep 2019 13:20 )  PTT:27.0 SEC           < from: TTE with Doppler (w/Cont) (05.06.19 @ 08:12) >    CONCLUSIONS:  Technically difficult study.  1. Mitral annular calcification, otherwise normal mitral  valve. Minimal mitral regurgitation.  2. Normal left ventricular internal dimensions and wall  thicknesses.  3. Endocardium not well visualized; grossly normal left  ventricular systolic function.  Endocardial visualization  enhanced with intravenous injection of echo contrast  (Definity).  4. Unable to accurately evaluate right ventricular size or  systolic function.    < from: Xray Chest 1 View- PORTABLE-Urgent (09.11.19 @ 12:06) >      IMPRESSION:  Clear lungs.

## 2019-09-11 NOTE — H&P ADULT - NSHPREVIEWOFSYSTEMS_GEN_ALL_CORE
REVIEW OF SYSTEMS:    CONSTITUTIONAL: + weakness   EYES/ENT: No visual changes;  No vertigo or throat pain   NECK: No pain or stiffness  RESPIRATORY: No cough, wheezing, hemoptysis; No shortness of breath  CARDIOVASCULAR: chest discomfort, non radiating   GASTROINTESTINAL: No abdominal or epigastric pain. No nausea, vomiting, or hematemesis; No diarrhea or constipation. No melena or hematochezia.  GENITOURINARY: No dysuria, frequency or hematuria  NEUROLOGICAL: No numbness or weakness  SKIN: No itching, burning, rashes, or lesions   All other review of systems is negative unless indicated above.

## 2019-09-11 NOTE — H&P ADULT - PROBLEM SELECTOR PLAN 1
anxiety attach however calm during my examination   stated that has chest discomfort   monitor for fall   monitor vitals  Psych eval PRN   restart home meds   Ativan PRN for worsening anxiety

## 2019-09-12 ENCOUNTER — TRANSCRIPTION ENCOUNTER (OUTPATIENT)
Age: 69
End: 2019-09-12

## 2019-09-12 DIAGNOSIS — F05 DELIRIUM DUE TO KNOWN PHYSIOLOGICAL CONDITION: ICD-10-CM

## 2019-09-12 LAB — TROPONIN T, HIGH SENSITIVITY: 51 NG/L — SIGNIFICANT CHANGE UP (ref ?–14)

## 2019-09-12 PROCEDURE — 99223 1ST HOSP IP/OBS HIGH 75: CPT

## 2019-09-12 RX ORDER — ALBUTEROL 90 UG/1
2 AEROSOL, METERED ORAL EVERY 6 HOURS
Refills: 0 | Status: DISCONTINUED | OUTPATIENT
Start: 2019-09-12 | End: 2019-09-13

## 2019-09-12 RX ORDER — OXYCODONE HYDROCHLORIDE 5 MG/1
1 TABLET ORAL
Qty: 0 | Refills: 0 | DISCHARGE

## 2019-09-12 RX ORDER — IPRATROPIUM/ALBUTEROL SULFATE 18-103MCG
3 AEROSOL WITH ADAPTER (GRAM) INHALATION ONCE
Refills: 0 | Status: COMPLETED | OUTPATIENT
Start: 2019-09-12 | End: 2019-09-12

## 2019-09-12 RX ORDER — HALOPERIDOL DECANOATE 100 MG/ML
1 INJECTION INTRAMUSCULAR EVERY 6 HOURS
Refills: 0 | Status: DISCONTINUED | OUTPATIENT
Start: 2019-09-12 | End: 2019-09-13

## 2019-09-12 RX ADMIN — AMLODIPINE BESYLATE 2.5 MILLIGRAM(S): 2.5 TABLET ORAL at 05:08

## 2019-09-12 RX ADMIN — Medication 3 MILLILITER(S): at 14:44

## 2019-09-12 RX ADMIN — HALOPERIDOL DECANOATE 1 MILLIGRAM(S): 100 INJECTION INTRAMUSCULAR at 21:13

## 2019-09-12 RX ADMIN — Medication 100 MILLIGRAM(S): at 13:26

## 2019-09-12 RX ADMIN — HEPARIN SODIUM 5000 UNIT(S): 5000 INJECTION INTRAVENOUS; SUBCUTANEOUS at 21:12

## 2019-09-12 RX ADMIN — Medication 1 MILLIGRAM(S): at 10:12

## 2019-09-12 RX ADMIN — CITALOPRAM 20 MILLIGRAM(S): 10 TABLET, FILM COATED ORAL at 13:26

## 2019-09-12 RX ADMIN — HEPARIN SODIUM 5000 UNIT(S): 5000 INJECTION INTRAVENOUS; SUBCUTANEOUS at 05:08

## 2019-09-12 RX ADMIN — Medication 1 MILLIGRAM(S): at 05:09

## 2019-09-12 RX ADMIN — Medication 100 MILLIGRAM(S): at 05:09

## 2019-09-12 RX ADMIN — Medication 81 MILLIGRAM(S): at 13:26

## 2019-09-12 RX ADMIN — GABAPENTIN 200 MILLIGRAM(S): 400 CAPSULE ORAL at 21:12

## 2019-09-12 RX ADMIN — HALOPERIDOL DECANOATE 1 MILLIGRAM(S): 100 INJECTION INTRAMUSCULAR at 14:28

## 2019-09-12 RX ADMIN — Medication 3 MILLILITER(S): at 09:30

## 2019-09-12 RX ADMIN — Medication 1 MILLIGRAM(S): at 21:12

## 2019-09-12 RX ADMIN — ATORVASTATIN CALCIUM 40 MILLIGRAM(S): 80 TABLET, FILM COATED ORAL at 21:12

## 2019-09-12 RX ADMIN — GABAPENTIN 200 MILLIGRAM(S): 400 CAPSULE ORAL at 13:26

## 2019-09-12 RX ADMIN — LOSARTAN POTASSIUM 100 MILLIGRAM(S): 100 TABLET, FILM COATED ORAL at 05:08

## 2019-09-12 RX ADMIN — Medication 1 MILLIGRAM(S): at 13:26

## 2019-09-12 RX ADMIN — Medication 20 MILLIGRAM(S): at 05:09

## 2019-09-12 RX ADMIN — GABAPENTIN 200 MILLIGRAM(S): 400 CAPSULE ORAL at 05:08

## 2019-09-12 RX ADMIN — HEPARIN SODIUM 5000 UNIT(S): 5000 INJECTION INTRAVENOUS; SUBCUTANEOUS at 13:27

## 2019-09-12 RX ADMIN — Medication 5 MILLIGRAM(S): at 05:09

## 2019-09-12 RX ADMIN — TIOTROPIUM BROMIDE 1 CAPSULE(S): 18 CAPSULE ORAL; RESPIRATORY (INHALATION) at 10:18

## 2019-09-12 NOTE — PHYSICAL THERAPY INITIAL EVALUATION ADULT - ADDITIONAL COMMENTS
Pt reports that she lives in a co-op with her  and son with no steps to negotiate; elevator inside. Prior to hospital admission pt reports that she wasn't really walking; just recently out of rehab. Pt reports that she gets assistance with bed to wheelchair transfer. Pt has a home health aide 7days/week 7hours/day. Pt denies any recent falls.    Pt left comfortable in bed, NAD, all lines intact, all precautions maintained, with call bell in hand, chair alarm on, and RN aware of PT evaluation. Pt reports that she lives in a co-op with her  and son with no steps to negotiate; elevator inside. Prior to hospital admission pt reports that she wasn't really walking; just recently out of rehab. Pt reports that she gets assistance with bed to wheelchair transfer. Pt has a home health aide 7days/week 7hours/day. Pt denies any recent falls. Pt reports that she was receiving home PT.     Pt left comfortable in bed, NAD, all lines intact, all precautions maintained, with call bell in hand, chair alarm on, and RN aware of PT evaluation.

## 2019-09-12 NOTE — DISCHARGE NOTE PROVIDER - CARE PROVIDER_API CALL
Juan Salcedo)  Internal Medicine  [address] [address] [place] [address]  Phone: [phone]  Fax: [phone]  Follow Up Time: Juan Salcedo)  Internal Medicine  [address] [address] [place] [address]  Phone: [phone]  Fax: [phone]  Follow Up Time:     [facility],   Phone: [phone]  Fax: (   )    -  Follow Up Time: 1-3 days    [facility],   [facility]  Phone: [phone]  Fax: (   )    -  Follow Up Time: 1 week

## 2019-09-12 NOTE — DISCHARGE NOTE NURSING/CASE MANAGEMENT/SOCIAL WORK - NSSCNAMETXT_GEN_ALL_CORE
[facility] [phone].  Nurse to visit on the day after discharge.  Other appropriate services to be arranged thereafter.   HHA 9:00-4:30 M-F and 9:00-6:30 S-S from [facility] YANIV Styles [phone]. Agency: [facility] [phone]

## 2019-09-12 NOTE — CHART NOTE - NSCHARTNOTEFT_GEN_A_CORE
Spoke to patient's  - There is concern for paranoia - Patient is medically cleared to be discharged but will have psych talk to  tomorrow morning to see if there is any indication for starting anti-psychotics and need for inpatient psych

## 2019-09-12 NOTE — PHYSICAL THERAPY INITIAL EVALUATION ADULT - IMPAIRMENTS FOUND, PT EVAL
aerobic capacity/endurance/cognitive impairment/gait, locomotion, and balance/posture/muscle strength

## 2019-09-12 NOTE — DISCHARGE NOTE PROVIDER - NSDCCPCAREPLAN_GEN_ALL_CORE_FT
PRINCIPAL DISCHARGE DIAGNOSIS  Diagnosis: Psychosis, paranoid  Assessment and Plan of Treatment: Continue your medications as directed and follow up with your primary care provider/psychiatrist for further evaluation/management. If you are ever in need of immediate psychiatric assistance you may reach out to the [facility] [phone].      SECONDARY DISCHARGE DIAGNOSES  Diagnosis: Thyroid disease  Assessment and Plan of Treatment:     Diagnosis: Hypertension  Assessment and Plan of Treatment: Continue blood pressure medication regimen as directed. Monitor for any visual changes, headaches or dizziness.  Monitor blood pressure regularly.  Follow up with your primary care provider for further management for high blood pressure.    Diagnosis: Hyperlipidemia  Assessment and Plan of Treatment: Continue prescribed medications to control your cholesterol levels and a DASH (Low fat/salt) diet. Follow up with your primary care provider upon discharge for further management and monitoring of cholesterol levels.    Diagnosis: Asthma  Assessment and Plan of Treatment: Continue your inhalers and annual pulmonary function tests with your outpatient provider/pulmonologist. Avoid exposure to potential triggers including smoke, dust, pollen, pets, etc. During allergy season be sure to shower before bed to rinse off excess pollen. Monitor for asthma exacerbation, such as, shortness of breath or any difficulties breathing and report to the emergency room in the event that your rescue inhaler has not resolved your symptoms. PRINCIPAL DISCHARGE DIAGNOSIS  Diagnosis: Psychosis, paranoid  Assessment and Plan of Treatment: Psychiatry was consulted during admission and there were no changes to your medications. You were set-up with a psychiatry program upon discharge. You should follow-up with the [facility] by calling [phone].      SECONDARY DISCHARGE DIAGNOSES  Diagnosis: Thyroid disease  Assessment and Plan of Treatment: New dose of Synthroid sent to pharmacy and you should start taking this on MONDAY 9/16/19 - Do not re-start your previous dose upon discharge. You should follow-up with [facility] for repeat thyroid function panel and antibodies within 1 week (Information provided). An ultrasound was performed during admission. You can follow-up results as outpatient.    Diagnosis: Hypertension  Assessment and Plan of Treatment: Continue blood pressure medication regimen as directed. Monitor for any visual changes, headaches or dizziness.  Monitor blood pressure regularly.  Follow up with your primary care provider for further management for high blood pressure.    Diagnosis: Hyperlipidemia  Assessment and Plan of Treatment: Continue prescribed medications to control your cholesterol levels and a DASH (Low fat/salt) diet. Follow up with your primary care provider upon discharge for further management and monitoring of cholesterol levels.    Diagnosis: Asthma  Assessment and Plan of Treatment: Continue your inhalers and annual pulmonary function tests with your outpatient provider/pulmonologist. Avoid exposure to potential triggers including smoke, dust, pollen, pets, etc. During allergy season be sure to shower before bed to rinse off excess pollen. Monitor for asthma exacerbation, such as, shortness of breath or any difficulties breathing and report to the emergency room in the event that your rescue inhaler has not resolved your symptoms.    Diagnosis: S/P parathyroidectomy  Assessment and Plan of Treatment: Ultrasound performed - Please follow-up with Endocrine as outpatient within 1 week for results.    Diagnosis: Osteopenia, unspecified location  Assessment and Plan of Treatment: You should follow-up with primary care provider as outpatient for parathyroid hormone levels and vitamin D levels as outpatient.  It is also recommended to obtain a DEXA scan as outpatient upon discharge.

## 2019-09-12 NOTE — PHYSICAL THERAPY INITIAL EVALUATION ADULT - PASSIVE RANGE OF MOTION EXAMINATION, REHAB EVAL
bilateral lower extremity Passive ROM was WFL (within functional limits)/bilateral lower extremity Passive ROM was WNL

## 2019-09-12 NOTE — PHYSICAL THERAPY INITIAL EVALUATION ADULT - PLANNED THERAPY INTERVENTIONS, PT EVAL
transfer training/bed mobility training/strengthening/postural re-education/balance training/gait training

## 2019-09-12 NOTE — PHYSICAL THERAPY INITIAL EVALUATION ADULT - GENERAL OBSERVATIONS, REHAB EVAL
Pt encountered seated in reclined chair, no distress, anxious, AxOx2/3, with +IV, and +O2 through nasal cannula

## 2019-09-12 NOTE — BEHAVIORAL HEALTH ASSESSMENT NOTE - RISK ASSESSMENT
Pt is a increased risk due to her underlying mood and anxiety d/o. Other risk factors include possible delirium in the setting of medical illness, chronic medical illness, psychosocial difficulties, and possible h/o trauma. Protective factors include dependent child, no current S/H/I/I/P, no access to firearms, and no history of SA.

## 2019-09-12 NOTE — PHYSICAL THERAPY INITIAL EVALUATION ADULT - PATIENT PROFILE REVIEW, REHAB EVAL
yes/No Formal Activity Order in the Computer; spoke with ADDI Lauren prior to PT evaluation--> Pt OK for PT consult/OOB activity

## 2019-09-12 NOTE — BEHAVIORAL HEALTH ASSESSMENT NOTE - NSBHCHARTREVIEWLAB_PSY_A_CORE FT
11.5   13.89 )-----------( 237      ( 11 Sep 2019 13:20 )             39.0     CBC Full  -  ( 11 Sep 2019 13:20 )  WBC Count : 13.89 K/uL  RBC Count : 4.42 M/uL  Hemoglobin : 11.5 g/dL  Hematocrit : 39.0 %  Platelet Count - Automated : 237 K/uL  Mean Cell Volume : 88.2 fL  Mean Cell Hemoglobin : 26.0 pg  Mean Cell Hemoglobin Concentration : 29.5 %  Auto Neutrophil # : 12.19 K/uL  Auto Lymphocyte # : 0.86 K/uL  Auto Monocyte # : 0.75 K/uL  Auto Eosinophil # : 0.01 K/uL  Auto Basophil # : 0.01 K/uL  Auto Neutrophil % : 87.7 %  Auto Lymphocyte % : 6.2 %  Auto Monocyte % : 5.4 %  Auto Eosinophil % : 0.1 %  Auto Basophil % : 0.1 %    09-11    137  |  97<L>  |  18  ----------------------------<  131<H>  4.1   |  28  |  0.54    Ca    9.9      11 Sep 2019 13:20    TPro  6.7  /  Alb  4.0  /  TBili  0.6  /  DBili  x   /  AST  19  /  ALT  18  /  AlkPhos  89  09-11    Creatinine Trend: 0.54<--, 0.59<--, 0.61<--, 0.61<--, 0.69<--  LIVER FUNCTIONS - ( 11 Sep 2019 13:20 )  Alb: 4.0 g/dL / Pro: 6.7 g/dL / ALK PHOS: 89 u/L / ALT: 18 u/L / AST: 19 u/L / GGT: x           PT/INR - ( 11 Sep 2019 13:20 )   PT: 11.8 SEC;   INR: 1.06          PTT - ( 11 Sep 2019 13:20 )  PTT:27.0 SEC

## 2019-09-12 NOTE — BEHAVIORAL HEALTH ASSESSMENT NOTE - AXIS III
asthma, COPD (quit smoking in 1997), HTN, HLD, Grave's disease, hypothyroidism, breast CA, kidney CA, cholecystectemy, laminectomy (difficulty ambulating as result of complications from the procedure

## 2019-09-12 NOTE — BEHAVIORAL HEALTH ASSESSMENT NOTE - NSBHCHARTREVIEWIMAGING_PSY_A_CORE FT
< from: Xray Chest 1 View- PORTABLE-Urgent (09.11.19 @ 12:06) >    INTERPRETATION:     The lungs are freeof focal abnormalities. Old fractures of the left   sixth and seventh ribs. The heart is not enlarged and there are no   effusions. Cervical spine orthopedic hardware.      COMPARISON:  September 10, 2019      IMPRESSION:  Clear lungs.    < end of copied text >

## 2019-09-12 NOTE — DISCHARGE NOTE PROVIDER - NSDCCAREPROVSEEN_GEN_ALL_CORE_FT
Reji Akhtar  User ADM  Salonie Shabbir Sanabria  Team [facility] Medicine ACP  Isha Fernandes  Ordering Physician  Tania Long  [facility] Endocrinology  Dennis Cantu
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
## 2019-09-12 NOTE — DISCHARGE NOTE PROVIDER - PROVIDER TOKENS
PROVIDER:[TOKEN:[[id number]]] PROVIDER:[TOKEN:[[id number]]],FREE:[LAST:[[facility]],PHONE:[phone]],FAX:[(   )    -],FOLLOWUP:[1-3 days]],FREE:[LAST:[[facility]],PHONE:[phone]],FAX:[(   )    -],ADDRESS:[[address] [place] [address]],FOLLOWUP:[1 week]]

## 2019-09-12 NOTE — DISCHARGE NOTE NURSING/CASE MANAGEMENT/SOCIAL WORK - PATIENT PORTAL LINK FT
You can access the FollowMyHealth Patient Portal offered by [facility] by registering at the following website: [email or web address]. By joining [employer]’s FollowMyHealth portal, you will also be able to view your health information using other applications (apps) compatible with our system.
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
## 2019-09-12 NOTE — PHYSICAL THERAPY INITIAL EVALUATION ADULT - ACTIVE RANGE OF MOTION EXAMINATION, REHAB EVAL
bilateral  lower extremity Active ROM was WFL (within functional limits)/bilateral lower extremity Active ROM was WNL (within normal limits)
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
## 2019-09-12 NOTE — BEHAVIORAL HEALTH ASSESSMENT NOTE - NSBHCONSULTFOLLOWAFTERCARE_PSY_A_CORE FT
no psychiatric contraindication to discharge, however primary team will need to ensure a safe discharge given concerning statement patient made about .

## 2019-09-12 NOTE — BEHAVIORAL HEALTH ASSESSMENT NOTE - NSBHCHARTREVIEWVS_PSY_A_CORE FT
ICU Vital Signs Last 24 Hrs  T(C): 36.6 (12 Sep 2019 03:54), Max: 36.7 (11 Sep 2019 16:00)  T(F): 97.8 (12 Sep 2019 03:54), Max: 98 (11 Sep 2019 16:00)  HR: 63 (12 Sep 2019 03:54) (49 - 90)  BP: 125/63 (12 Sep 2019 03:54) (117/65 - 138/48)  BP(mean): --  ABP: --  ABP(mean): --  RR: 18 (12 Sep 2019 03:54) (18 - 24)  SpO2: 100% (12 Sep 2019 03:54) (98% - 100%)
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
## 2019-09-13 VITALS
TEMPERATURE: 98 F | HEART RATE: 89 BPM | OXYGEN SATURATION: 99 % | SYSTOLIC BLOOD PRESSURE: 113 MMHG | RESPIRATION RATE: 18 BRPM | DIASTOLIC BLOOD PRESSURE: 56 MMHG

## 2019-09-13 DIAGNOSIS — E27.40 UNSPECIFIED ADRENOCORTICAL INSUFFICIENCY: ICD-10-CM

## 2019-09-13 DIAGNOSIS — R79.89 OTHER SPECIFIED ABNORMAL FINDINGS OF BLOOD CHEMISTRY: ICD-10-CM

## 2019-09-13 DIAGNOSIS — M85.80 OTHER SPECIFIED DISORDERS OF BONE DENSITY AND STRUCTURE, UNSPECIFIED SITE: ICD-10-CM

## 2019-09-13 DIAGNOSIS — E03.9 HYPOTHYROIDISM, UNSPECIFIED: ICD-10-CM

## 2019-09-13 DIAGNOSIS — Z98.890 OTHER SPECIFIED POSTPROCEDURAL STATES: ICD-10-CM

## 2019-09-13 LAB
ANION GAP SERPL CALC-SCNC: 11 MMO/L — SIGNIFICANT CHANGE UP (ref 7–14)
BUN SERPL-MCNC: 19 MG/DL — SIGNIFICANT CHANGE UP (ref 7–23)
CALCIUM SERPL-MCNC: 9.7 MG/DL — SIGNIFICANT CHANGE UP (ref 8.4–10.5)
CHLORIDE SERPL-SCNC: 101 MMOL/L — SIGNIFICANT CHANGE UP (ref 98–107)
CO2 SERPL-SCNC: 32 MMOL/L — HIGH (ref 22–31)
CREAT SERPL-MCNC: 0.63 MG/DL — SIGNIFICANT CHANGE UP (ref 0.5–1.3)
GLUCOSE SERPL-MCNC: 83 MG/DL — SIGNIFICANT CHANGE UP (ref 70–99)
HCT VFR BLD CALC: 39.9 % — SIGNIFICANT CHANGE UP (ref 34.5–45)
HGB BLD-MCNC: 12.2 G/DL — SIGNIFICANT CHANGE UP (ref 11.5–15.5)
MAGNESIUM SERPL-MCNC: 2.2 MG/DL — SIGNIFICANT CHANGE UP (ref 1.6–2.6)
MCHC RBC-ENTMCNC: 26.8 PG — LOW (ref 27–34)
MCHC RBC-ENTMCNC: 30.6 % — LOW (ref 32–36)
MCV RBC AUTO: 87.5 FL — SIGNIFICANT CHANGE UP (ref 80–100)
NRBC # FLD: 0 K/UL — SIGNIFICANT CHANGE UP (ref 0–0)
PHOSPHATE SERPL-MCNC: 3 MG/DL — SIGNIFICANT CHANGE UP (ref 2.5–4.5)
PLATELET # BLD AUTO: 180 K/UL — SIGNIFICANT CHANGE UP (ref 150–400)
PMV BLD: 11.1 FL — SIGNIFICANT CHANGE UP (ref 7–13)
POTASSIUM SERPL-MCNC: 3.7 MMOL/L — SIGNIFICANT CHANGE UP (ref 3.5–5.3)
POTASSIUM SERPL-SCNC: 3.7 MMOL/L — SIGNIFICANT CHANGE UP (ref 3.5–5.3)
RBC # BLD: 4.56 M/UL — SIGNIFICANT CHANGE UP (ref 3.8–5.2)
RBC # FLD: 15 % — HIGH (ref 10.3–14.5)
SODIUM SERPL-SCNC: 144 MMOL/L — SIGNIFICANT CHANGE UP (ref 135–145)
WBC # BLD: 9.58 K/UL — SIGNIFICANT CHANGE UP (ref 3.8–10.5)
WBC # FLD AUTO: 9.58 K/UL — SIGNIFICANT CHANGE UP (ref 3.8–10.5)

## 2019-09-13 PROCEDURE — 99233 SBSQ HOSP IP/OBS HIGH 50: CPT

## 2019-09-13 PROCEDURE — 76536 US EXAM OF HEAD AND NECK: CPT | Mod: 26

## 2019-09-13 PROCEDURE — 99223 1ST HOSP IP/OBS HIGH 75: CPT

## 2019-09-13 RX ORDER — LEVOTHYROXINE SODIUM 125 MCG
1 TABLET ORAL
Qty: 30 | Refills: 0
Start: 2019-09-13

## 2019-09-13 RX ADMIN — ALBUTEROL 2 PUFF(S): 90 AEROSOL, METERED ORAL at 14:42

## 2019-09-13 RX ADMIN — Medication 100 MILLIGRAM(S): at 13:27

## 2019-09-13 RX ADMIN — Medication 1 MILLIGRAM(S): at 06:30

## 2019-09-13 RX ADMIN — HEPARIN SODIUM 5000 UNIT(S): 5000 INJECTION INTRAVENOUS; SUBCUTANEOUS at 06:30

## 2019-09-13 RX ADMIN — Medication 20 MILLIGRAM(S): at 06:29

## 2019-09-13 RX ADMIN — LOSARTAN POTASSIUM 100 MILLIGRAM(S): 100 TABLET, FILM COATED ORAL at 06:30

## 2019-09-13 RX ADMIN — CITALOPRAM 20 MILLIGRAM(S): 10 TABLET, FILM COATED ORAL at 13:26

## 2019-09-13 RX ADMIN — Medication 81 MILLIGRAM(S): at 13:26

## 2019-09-13 RX ADMIN — AMLODIPINE BESYLATE 2.5 MILLIGRAM(S): 2.5 TABLET ORAL at 06:29

## 2019-09-13 RX ADMIN — Medication 5 MILLIGRAM(S): at 06:30

## 2019-09-13 RX ADMIN — Medication 3 MILLILITER(S): at 11:38

## 2019-09-13 RX ADMIN — TIOTROPIUM BROMIDE 1 CAPSULE(S): 18 CAPSULE ORAL; RESPIRATORY (INHALATION) at 09:00

## 2019-09-13 RX ADMIN — ALBUTEROL 2 PUFF(S): 90 AEROSOL, METERED ORAL at 21:49

## 2019-09-13 RX ADMIN — HEPARIN SODIUM 5000 UNIT(S): 5000 INJECTION INTRAVENOUS; SUBCUTANEOUS at 13:28

## 2019-09-13 RX ADMIN — GABAPENTIN 200 MILLIGRAM(S): 400 CAPSULE ORAL at 06:30

## 2019-09-13 RX ADMIN — GABAPENTIN 200 MILLIGRAM(S): 400 CAPSULE ORAL at 13:26

## 2019-09-13 RX ADMIN — Medication 1 MILLIGRAM(S): at 13:26

## 2019-09-13 RX ADMIN — HALOPERIDOL DECANOATE 1 MILLIGRAM(S): 100 INJECTION INTRAMUSCULAR at 20:23

## 2019-09-13 RX ADMIN — HALOPERIDOL DECANOATE 1 MILLIGRAM(S): 100 INJECTION INTRAMUSCULAR at 09:00

## 2019-09-13 NOTE — CONSULT NOTE ADULT - ATTENDING COMMENTS
Dennis Cantu MD   Pager # [phone]  On evenings and weekends, please call the office at [phone] or page endocrine fellow on call. Please note that this patient may be followed by different provider tomorrow. If no answer, contact the office.

## 2019-09-13 NOTE — CHART NOTE - NSCHARTNOTEFT_GEN_A_CORE
Spoke with psychiatry who feels patient can go home with outpatient psych follow-up on current medications - As per  patient does not follow-up with psych, so case management worked on enrolling patient in psych program upon discharge which was discussed with . Information/numbers provided. Endocrine recommended labs which can we followed up as outpatient - provided with clinic information. As per Dr. Welch patient can go home today and start lowered dose of Synthroid of 100mcg daily to start Monday 9/16/19 - Patient to be transported around 530

## 2019-09-13 NOTE — CONSULT NOTE ADULT - ASSESSMENT
68 year old woman with PMH of prior Graves disease complicated by Graves orbitopathy, now with hypothyroidism, asthma/COPD on chronic steroids, history of parathyroidectomy, DVT, Anxiety, Breast/renal CA presents from home after taking too many medications, found to have suppressed TSH which may be due to taking too much levothyroxine versus reoccurrence of Graves disease versus acute thyroiditis versus sick euthyroid (less likely) versus steroid effect

## 2019-09-13 NOTE — CONSULT NOTE ADULT - PROBLEM SELECTOR RECOMMENDATION 9
- patient noted to have low TSH <0.1 with slightly elevated free T4 and low total T3  - differential at this time is wide and higher up on the differential includes taking too much LT4 (or, if patient has not been taking too much LT4, too high dose of LT4) versus reoccurrence of Graves disease. Other causes include an acute thyroiditis versus steroid effect versus sick euthyroid  - recommend check TSH receptor Ab, TSI and TPO  - patient reports never having a thyroidectomy done and given presence of severe orbitopathy, seems unlikely that she would have received FAYE for Graves disease. Given neck scar and history of parathyroidectomy, she may have also have had a thyroidectomy - would check a thyroid/parathyroid ultrasound to determine if this is the case  - for now, hold Levothyroxine and would trend free T4 - can check again in AM  - will follow

## 2019-09-13 NOTE — CONSULT NOTE ADULT - PROBLEM SELECTOR RECOMMENDATION 4
- presumably due to primary hyperparathyroidism  - calcium normal  - repeat PTH with vitamin D levels as outpatient - has a history of osteopenia and now with chronic steroid use  - recommend obtain repeat DEXA as outpatient  - check vitamin D 25 level

## 2019-09-13 NOTE — CONSULT NOTE ADULT - SUBJECTIVE AND OBJECTIVE BOX
HPI:  Pt is a 68 y.o female with pmhx of COPD, Asthma, DVT, Anxiety, Breast/renal CA presenting from home to the ER by ambulance. per patient , her  called the ambulance because she was taking too much medication and he held it from her today. She says she lives with her  and son. Has been seen in the past with similar complaints regarding , has seen Pysch and social work. Social work saw her yesterday and pt declined any services from them. As per patient, she has been having chest discomfort. denies of nay SOB, palpitation, SOB, nausea, vomiting. patient was recently admitted fro LE cellulitis (11 Sep 2019 17:58)      PAST MEDICAL & SURGICAL HISTORY:  Asthma  Sleep apnea  Obesity  DVT (deep venous thrombosis): history of- not presently on A/C  Hypothyroid  COPD (chronic obstructive pulmonary disease)  Breast cancer in situ, left  Kidney cancer, primary, with metastasis from kidney to other site, left: LEft sided- s/p nephrectomy only- no chemo or RT  Graves disease  Anxiety  Hyperlipidemia  Hypertension  S/P laminectomy: now bed and wheelchair ridden with severe pain  History of parathyroidectomy  History of carpal tunnel release: right wrist  History of eye surgery: 7 surgeries secondary to grave&#x27;s disease  History of pelvic surgery: Pelvic Sling  S/P breast biopsy: left  S/p nephrectomy: left  S/P cholecystectomy      FAMILY HISTORY:  Family history of thyroid cancer (Child): daughter has thyroid cancer  Family history of heart disease (Sibling): brother has a PPM  Family history of diabetes mellitus (Sibling): siblings  Family history of hypertension: mother and father  Family history of myocardial infarction: mother  at age 67 and father at age 67 of MI&#x27;s      Social History:    Outpatient Medications:    MEDICATIONS  (STANDING):  amLODIPine   Tablet 2.5 milliGRAM(s) Oral daily  aspirin  chewable 81 milliGRAM(s) Oral daily  atorvastatin 40 milliGRAM(s) Oral at bedtime  citalopram 20 milliGRAM(s) Oral daily  docusate sodium 100 milliGRAM(s) Oral three times a day  gabapentin 200 milliGRAM(s) Oral three times a day  heparin  Injectable 5000 Unit(s) SubCutaneous every 8 hours  LORazepam     Tablet 1 milliGRAM(s) Oral three times a day  losartan 100 milliGRAM(s) Oral daily  predniSONE   Tablet 5 milliGRAM(s) Oral daily  tiotropium 18 MICROgram(s) Capsule 1 Capsule(s) Inhalation daily  torsemide 20 milliGRAM(s) Oral daily    MEDICATIONS  (PRN):  ALBUTerol    90 MICROgram(s) HFA Inhaler 2 Puff(s) Inhalation every 6 hours PRN Shortness of Breath  ALBUTerol/ipratropium for Nebulization. 3 milliLiter(s) Nebulizer every 6 hours PRN Shortness of Breath and/or Wheezing  haloperidol     Tablet 1 milliGRAM(s) Oral every 6 hours PRN Agitation  haloperidol    Injectable 1 milliGRAM(s) IntraMuscular every 6 hours PRN Agitation  haloperidol    Injectable 1 milliGRAM(s) IV Push every 6 hours PRN Agitation      Allergies    Grapes (Hives)  No Known Drug Allergies  Peaches (Hives)  shellfish (Hives)    Intolerances      Review of Systems:  Constitutional: No fever  Eyes: No blurry vision  Neuro: No tremors  HEENT: No pain  Cardiovascular: No chest pain, palpitations  Respiratory: No SOB, no cough  GI: No nausea, vomiting, abdominal pain  : No dysuria  Skin: no rash  Psych: no depression  Endocrine: no polyuria, polydipsia  Hem/lymph: no swelling  Osteoporosis: no fractures    ALL OTHER SYSTEMS REVIEWED AND NEGATIVE    UNABLE TO OBTAIN    PHYSICAL EXAM:  VITALS: T(C): 36.7 (19 @ 06:23)  T(F): 98 (19 @ 06:23), Max: 98.5 (19 @ 19:36)  HR: 93 (19 @ 11:37) (68 - 97)  BP: 143/57 (19 @ 06:23) (125/74 - 143/57)  RR:  (18 - 18)  SpO2:  (96% - 100%)  Wt(kg): --  GENERAL: NAD, well-groomed, well-developed  EYES: No proptosis, no lid lag, anicteric  HEENT:  Atraumatic, Normocephalic, moist mucous membranes  THYROID: Normal size, no palpable nodules  RESPIRATORY: Clear to auscultation bilaterally; No rales, rhonchi, wheezing  CARDIOVASCULAR: Regular rate and rhythm; No murmurs; no peripheral edema  GI: Soft, nontender, non distended, normal bowel sounds  SKIN: Dry, intact, No rashes or lesions  MUSCULOSKELETAL: Full range of motion, normal strength  NEURO: sensation intact, extraocular movements intact, no tremor  PSYCH: Alert and oriented x 3, normal affect, normal mood  CUSHING'S SIGNS: no striae                              12.2   9.58  )-----------( 180      ( 13 Sep 2019 06:25 )             39.9       -13    144  |  101  |  19  ----------------------------<  83  3.7   |  32<H>  |  0.63    EGFR if : 105  EGFR if non : 91    Ca    9.7        Mg     2.2       Phos  3.0         TPro  6.7  /  Alb  4.0  /  TBili  0.6  /  DBili  x   /  AST  19  /  ALT  18  /  AlkPhos  89  -11      Thyroid Function Tests:   @ 13:20 TSH < 0.10 FreeT4 1.81 T3 66.6 Anti TPO -- Anti Thyroglobulin Ab -- TSI --   @ 06:23 TSH 2.15 FreeT4 -- T3 -- Anti TPO -- Anti Thyroglobulin Ab -- TSI --           Chol 140 LDL 64 HDL 63 Trig 85      Radiology: HPI:  Patient is a 68 year old woman with PMH of prior Graves disease complicated by Graves orbitopathy, now with hypothyroidism, COPD, Asthma, DVT, Anxiety, Breast/renal CA presents from home after taking too many medications. Seen by psychiatry today who does not believe that patient needs inpatient psychiatric hospitalization at this time. At the bedside, she is agitated and anxious but is AAO x3. She states that she was diagnosed with Graves years ago and was previously on PTU, but her thyroid became "burnt out" and she is now on Levothyroxine 125 mcg daily. She states that she did not have FAYE or a thyroidectomy. She reports adherence to LT4 125 mcg daily and states that she has not been taking more than she should. Takes it every day, in the AM. Does not have neck pain, dysphagia, dysphonia. Reports chest pain from her asthma, no palpitations, abdominal pain, nausea, vomiting, diarrhea, constipation. She previously lost weight and then gained it back. Has heat intolerance, no cold intolerance. She also states that she had Graves orbitopathy and had to have multiple surgeries for it. Did not receive IV contrast here.    TSH here less than <0.1 and free T4 1.81, total T3 66.3. TSH several weeks ago was normal.     Noted that she has a neck scar, reports a history of parathyroidectomy in the past, does not know why she had it, maybe related to high calcium? There is no family history of hypercalcemia. She had a DEXA in the past, does not remember when, but has osteopenia. Was told to stop taking calcium and vitamin D supplements previously. No history of nephrolithiases.      She has been on chronic steroids for asthma for the last 6 months, highest dose she was on was Prednisone 20 mg daily, now on Prednisone 5m g daily.     PAST MEDICAL & SURGICAL HISTORY:  Asthma  Sleep apnea  Obesity  DVT (deep venous thrombosis): history of- not presently on A/C  Hypothyroid  COPD (chronic obstructive pulmonary disease)  Breast cancer in situ, left  Kidney cancer, primary, with metastasis from kidney to other site, left: LEft sided- s/p nephrectomy only- no chemo or RT  Graves disease  Anxiety  Hyperlipidemia  Hypertension  S/P laminectomy: now bed and wheelchair ridden with severe pain  History of parathyroidectomy  History of carpal tunnel release: right wrist  History of eye surgery: 7 surgeries secondary to graves disease  History of pelvic surgery: Pelvic Sling  S/P breast biopsy: left  S/p nephrectomy: left  S/P cholecystectomy    FAMILY HISTORY:  Family history of thyroid cancer (Child): daughter has thyroid cancer  Hypothyroidism in son   Family history of heart disease (Sibling): brother has a PPM  Family history of diabetes mellitus (Sibling): siblings  Family history of hypertension: mother and father  Family history of myocardial infarction: mother  at age 67 and father at age 67 of MI&#x27;s    Social History:  no cigarette use  no alcohol use      Outpatient Medications:  · 	citalopram 20 mg oral tablet: 1 tab(s) orally once a day  · 	Multiple Vitamins oral tablet: 1 tab(s) orally once a day  · 	folic acid 1 mg oral tablet: 1 tab(s) orally once a day  · 	levothyroxine 125 mcg (0.125 mg) oral capsule: 1 cap(s) orally once a day  · 	lactobacillus acidophilus oral capsule: 1 tab(s) orally 3 times a day (with meals)   · 	docusate sodium 100 mg oral capsule: 1 cap(s) orally 3 times a day  · 	torsemide 20 mg oral tablet: 1 tab(s) orally once a day  	hold for SBP<110  · 	amLODIPine 2.5 mg oral tablet: 1 tab(s) orally once a day  	hold for SBP<110  · 	Spiriva 18 mcg inhalation capsule: 1 cap(s) inhaled once a day  · 	albuterol 2.5 mg/3 mL (0.083%) inhalation solution: 3 milliliter(s) inhaled every 6 hours  · 	Ventolin HFA 90 mcg/inh inhalation aerosol: 2 puff(s) inhaled 4 times a day, As Needed  · 	melatonin 3 mg oral tablet: 1 tab(s) orally once a day (at bedtime), As needed, Insomnia  · 	aspirin 81 mg oral tablet, chewable: 1 tab(s) orally once a day  · 	losartan 100 mg oral tablet: 1 tab(s) orally once a day  	hold for SBP<110  · 	gabapentin 100 mg oral capsule: 2 cap(s) orally 3 times a day  · 	atorvastatin 40 mg oral tablet: 1 tab(s) orally once a day (at bedtime)  · 	predniSONE 5 mg oral tablet: 1 tab(s) orally once a day  · 	LORazepam 0.5 mg oral tablet: 2 tab(s) orally 4 times a day, As Needed  · 	acetaminophen 325 mg oral tablet: 2 tab(s) orally every 6 hours, As needed,   · 	oxyCODONE 5 mg oral tablet: 1 tab(s) orally every 6 hours, As Needed    MEDICATIONS  (STANDING):  amLODIPine   Tablet 2.5 milliGRAM(s) Oral daily  aspirin  chewable 81 milliGRAM(s) Oral daily  atorvastatin 40 milliGRAM(s) Oral at bedtime  citalopram 20 milliGRAM(s) Oral daily  docusate sodium 100 milliGRAM(s) Oral three times a day  gabapentin 200 milliGRAM(s) Oral three times a day  heparin  Injectable 5000 Unit(s) SubCutaneous every 8 hours  LORazepam     Tablet 1 milliGRAM(s) Oral three times a day  losartan 100 milliGRAM(s) Oral daily  predniSONE   Tablet 5 milliGRAM(s) Oral daily  tiotropium 18 MICROgram(s) Capsule 1 Capsule(s) Inhalation daily  torsemide 20 milliGRAM(s) Oral daily    MEDICATIONS  (PRN):  ALBUTerol    90 MICROgram(s) HFA Inhaler 2 Puff(s) Inhalation every 6 hours PRN Shortness of Breath  ALBUTerol/ipratropium for Nebulization. 3 milliLiter(s) Nebulizer every 6 hours PRN Shortness of Breath and/or Wheezing  haloperidol     Tablet 1 milliGRAM(s) Oral every 6 hours PRN Agitation  haloperidol    Injectable 1 milliGRAM(s) IntraMuscular every 6 hours PRN Agitation  haloperidol    Injectable 1 milliGRAM(s) IV Push every 6 hours PRN Agitation      Allergies    Grapes (Hives)  No Known Drug Allergies  Peaches (Hives)  shellfish (Hives)      Review of Systems:  Constitutional: No fever  Eyes: + blurry vision  Neuro: No tremors  HEENT: No pain  Cardiovascular: No chest pain, palpitations  Respiratory: No SOB, no cough  GI: No nausea, vomiting, abdominal pain  : No dysuria  Skin: no rash  Psych: no depression  Endocrine: + heat intolerance, no cold intolerance    ALL OTHER SYSTEMS REVIEWED AND NEGATIVE    PHYSICAL EXAM:  VITALS: T(C): 36.7 (19 @ 06:23)  T(F): 98 (19 @ 06:23), Max: 98.5 (19 @ 19:36)  HR: 93 (19 @ 11:37) (68 - 97)  BP: 143/57 (19 @ 06:23) (125/74 - 143/57)  RR:  (18 - 18)  SpO2:  (96% - 100%)  Wt(kg): --  GENERAL: NAD, well-developed  EYES: + proptosis, no notable lid lag, anicteric  HEENT:  Atraumatic, Normocephalic  THYROID: well healed scar, no nodules palpable   RESPIRATORY: Clear to auscultation bilaterally; No rales, rhonchi, wheezing  CARDIOVASCULAR: Regular rate and rhythm; No murmurs; trace peripheral edema bilaterally  GI: Soft, nontender, non distended, + bowel sounds  SKIN: Dry, intact, No rashes or lesions  MUSCULOSKELETAL: Full range of motion, normal strength  NEURO: + tremor  PSYCH: Alert and oriented x 3, reactive affect                            12.2   9.58  )-----------( 180      ( 13 Sep 2019 06:25 )             39.9       -13    144  |  101  |  19  ----------------------------<  83  3.7   |  32<H>  |  0.63    EGFR if : 105  EGFR if non : 91    Ca    9.7        Mg     2.2       Phos  3.0         TPro  6.7  /  Alb  4.0  /  TBili  0.6  /  DBili  x   /  AST  19  /  ALT  18  /  AlkPhos  89  09-11      Thyroid Function Tests:   @ 13:20 TSH < 0.10 FreeT4 1.81 T3 66.6 Anti TPO -- Anti Thyroglobulin Ab -- TSI --   @ 06:23 TSH 2.15 FreeT4 -- T3 -- Anti TPO -- Anti Thyroglobulin Ab -- TSI --           Chol 140 LDL 64 HDL 63 Trig 85

## 2019-09-13 NOTE — PROGRESS NOTE BEHAVIORAL HEALTH - NSBHCONSULTFOLLOWAFTERCARE_PSY_A_CORE FT
no psychiatric contraindication to discharge, however primary team will need to ensure a safe discharge given concerning statement patient made about .    If pt would like further psychiatric care she may f/u at the [facility] [phone]

## 2019-09-13 NOTE — PROGRESS NOTE BEHAVIORAL HEALTH - RISK ASSESSMENT
Pt is a increased risk due to her underlying mood and anxiety d/o. Other risk factors include possible delirium in the setting of medical illness, chronic medical illness, psychosocial difficulties, and possible h/o trauma. Protective factors include dependent child, no current S/H/I/I/P, no access to firearms, and no history of SA. While pt has poor insight and is extremely anxious, she is not in imminent danger of suicide, aggression, or homicide and is able to take care of herself via her  and HHA. She does not meet criteria for inpatient psychiatric hospitalization at this time.
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
## 2019-09-13 NOTE — PROGRESS NOTE ADULT - ASSESSMENT
_________________________________________________________________________________________  ========>>  M E D I C A L   A T T E N D I N G    F O L L O W  U P  N O T E  <<=========  -----------------------------------------------------------------------------------------------------    - Patient seen and examined by me earlier today.   - In summary,  SHEKHAR VASQUEZ is a 68y year old woman who originally presented with anxiety, SOB, delirium / psychosis   - Patient today overall doing ok, comfortable, eating OK. wants to go  home!     ==================>> REVIEW OF SYSTEM <<=================    GEN: no fever, no chills, no pain  RESP: ++ SOB, no cough, no sputum   CVS: no chest pain, no palpitations, no edema  GI: no abdominal pain, no nausea, no constipation, no diarrhea  : no dysuria, no frequency, no hematuria  Neuro: no headache, no dizziness  Derm : no itching, no rash    ==================>> PHYSICAL EXAM <<=================    GEN: A&O X 3 , NAD , comfortable in chair      pt becomes dramatic at times, c/o chest pain, sob, clutching chest but very easily distracted with other questions, almost forgetting about c/o ..  HEENT: NCAT, PERRL, MMM, hearing intact  Neck: supple , no JVD  CVS: S1S2 , regular , No M/R/G appreciated  PULM: CTA B/L,  no W/R/R appreciated  ABD.: soft. non tender, non distended,  bowel sounds present  Extrem: intact pulses , no edema       ==================>> MEDICATIONS <<====================    MEDICATIONS  (STANDING):  amLODIPine   Tablet 2.5 milliGRAM(s) Oral daily  aspirin  chewable 81 milliGRAM(s) Oral daily  atorvastatin 40 milliGRAM(s) Oral at bedtime  citalopram 20 milliGRAM(s) Oral daily  docusate sodium 100 milliGRAM(s) Oral three times a day  gabapentin 200 milliGRAM(s) Oral three times a day  heparin  Injectable 5000 Unit(s) SubCutaneous every 8 hours  LORazepam     Tablet 1 milliGRAM(s) Oral three times a day  losartan 100 milliGRAM(s) Oral daily  predniSONE   Tablet 5 milliGRAM(s) Oral daily  tiotropium 18 MICROgram(s) Capsule 1 Capsule(s) Inhalation daily  torsemide 20 milliGRAM(s) Oral daily    MEDICATIONS  (PRN):  ALBUTerol    90 MICROgram(s) HFA Inhaler 2 Puff(s) Inhalation every 6 hours PRN Shortness of Breath  ALBUTerol/ipratropium for Nebulization. 3 milliLiter(s) Nebulizer every 6 hours PRN Shortness of Breath and/or Wheezing  haloperidol     Tablet 1 milliGRAM(s) Oral every 6 hours PRN Agitation  haloperidol    Injectable 1 milliGRAM(s) IntraMuscular every 6 hours PRN Agitation  haloperidol    Injectable 1 milliGRAM(s) IV Push every 6 hours PRN Agitation      ==================>> VITAL SIGNS <<==================    T(C): 36.9 (09-12-19 @ 19:36), Max: 36.9 (09-12-19 @ 19:36)  HR: 83 (09-12-19 @ 19:36) (49 - 88)  BP: 130/88 (09-12-19 @ 19:36) (125/63 - 136/66)  RR: 18 (09-12-19 @ 19:36) (18 - 20)  SpO2: 98% (09-12-19 @ 19:36) (98% - 100%)      ==================>> LAB AND IMAGING <<==================                        11.5   13.89 )-----------( 237      ( 11 Sep 2019 13:20 )             39.0        137  |  97<L>  |  18  ----------------------------<  131<H>  4.1   |  28  |  0.54    Ca    9.9      11 Sep 2019 13:20    TPro  6.7  /  Alb  4.0  /  TBili  0.6  /  DBili  x   /  AST  19  /  ALT  18  /  AlkPhos  89  09-11    PT/INR - ( 11 Sep 2019 13:20 )   PT: 11.8 SEC;   INR: 1.06     PTT - ( 11 Sep 2019 13:20 )  PTT:27.0 SEC               TSH:      < 0.10   (09-11-19)       ,     2.15   (08-18-19)           Lipid profile:  (08-18-19)     Total: 140     LDL  : 64     HDL  :63     TG   :85     __________________________________________________________________________________  ===============>>  A S S E S S M E N T   A N D   P L A N <<===============  ------------------------------------------------------------------------------------------    · Assessment      Pt is a 68 y.o female with pmhx of COPD, Asthma, DVT, Anxiety, Breast/renal CA presenting from home to the ER by ambulance. per patient , her  called the ambulance because she was taking too much medication and he held it from her today. She says she lives with her  and son. Has been seen in the past with similar complaints regarding , has seen [facility] and social work. Social work saw her yesterday and pt declined any services from them.     Problem/Plan - 1:  ·  Problem: Psychosis, paranoid      anxiety attach however calm at times , when distracted   Psych eval noted: needs further assessment and obtainment of collateral from  tomorrow   continue meds as above  Ativan PRN for worsening anxiety.   pt should not sing AMA  need further safe discharge planing otherwise    Problem/Plan - 2:  ·  Problem: Chest pain, atypical. : likely due to anxiety   card appreciated  no further intervention at this time     Problem/Plan - 3:  ·  Problem: Leukocytosis.  mild   afebrile can be reactive   monitor for fever  hold Abx for now.  Problem/Plan - 4:  ·  Problem: Hyperthyroidism. Plan: low TSH level  on thyroid supplement >> pt states sometimes takes more than one tablet !!   hold levothyroxin for now   pt educated in detail about over medications  pt to follow up as OP with PMD re TSH checking     resume home synthroid on discharge .  Problem/Plan - 5:  ·  Problem: Asthma: stable     pt is obsessed about Nebs abd inhalers      use PRN     Problem/Plan - 6:  Problem: Hypertension.  Continue Current medications and monitor.   carido f.u    Problem/Plan - 7:  ·  Problem: Hyperlipidemia. Plan: lipidp stephane  statin  low fat diet.    Problem/Plan - 8:  ·  Problem: Prophylactic measure.  Plan: DVT and GI PPX.     safe discharge planing pending psych re-eval tomorrow as above   --------------------------------------------  Case discussed with pt, staff, PMD  Education given on findings and plan of care  ___________________________  H. JANICE Welch.  Pager: [phone]
M E D I C A L   A T T E N D I N G    F O L L O W    U P   N O T E                                     ------------------------------------------------------------------------------------------------    patient evaluated by me, case discussed with team, chart, medications, and physical exam reviewed, labs / tests  and vitals reviewed by me, as remedios.   Patient is stable for discharge today. appreciated psychiatry re evaluation.. social work / case management apprcated: opt arranged for home psych evaluation and set up  Patient to follow up with  PMD / pulm / cardio / psych   See discharge document for full note.      ==================>> MEDICATIONS <<====================    amLODIPine   Tablet 2.5 milliGRAM(s) Oral daily  aspirin  chewable 81 milliGRAM(s) Oral daily  atorvastatin 40 milliGRAM(s) Oral at bedtime  citalopram 20 milliGRAM(s) Oral daily  docusate sodium 100 milliGRAM(s) Oral three times a day  gabapentin 200 milliGRAM(s) Oral three times a day  heparin  Injectable 5000 Unit(s) SubCutaneous every 8 hours  LORazepam     Tablet 1 milliGRAM(s) Oral three times a day  losartan 100 milliGRAM(s) Oral daily  predniSONE   Tablet 5 milliGRAM(s) Oral daily  tiotropium 18 MICROgram(s) Capsule 1 Capsule(s) Inhalation daily  torsemide 20 milliGRAM(s) Oral daily    MEDICATIONS  (PRN):  ALBUTerol    90 MICROgram(s) HFA Inhaler 2 Puff(s) Inhalation every 6 hours PRN Shortness of Breath  ALBUTerol/ipratropium for Nebulization. 3 milliLiter(s) Nebulizer every 6 hours PRN Shortness of Breath and/or Wheezing  haloperidol     Tablet 1 milliGRAM(s) Oral every 6 hours PRN Agitation  haloperidol    Injectable 1 milliGRAM(s) IntraMuscular every 6 hours PRN Agitation  haloperidol    Injectable 1 milliGRAM(s) IV Push every 6 hours PRN Agitation    ==================>> VITAL SIGNS <<==================    T(C): 36.5 (09-13-19 @ 13:24), Max: 36.9 (09-12-19 @ 19:36)  HR: 75 (09-13-19 @ 13:24) (75 - 97)  BP: 139/90 (09-13-19 @ 13:24) (130/88 - 143/57)  RR: 18 (09-13-19 @ 13:24) (18 - 18)  SpO2: 100% (09-13-19 @ 13:24) (96% - 100%)      ==================>> LAB AND IMAGING <<==================                        12.2   9.58  )-----------( 180      ( 13 Sep 2019 06:25 )             39.9        09-13    144  |  101  |  19  ----------------------------<  83  3.7   |  32<H>  |  0.63    Ca    9.7      13 Sep 2019 06:25  Phos  3.0     09-13  Mg     2.2     09-13                   TSH:      < 0.10   (09-11-19)       ,     2.15   (08-18-19)           Lipid profile:  (08-18-19)     Total: 140     LDL  : 64     HDL  :63     TG   :85     WBC count:   9.58 <<== ,  13.89 <<==   Hemoglobin:   12.2 <<==,  11.5 <<==  platelets:  180 <==, 237 <==    Creatinine:  0.63  <<==, 0.54  <<==  Sodium:   144  <==, 137  <==

## 2019-09-13 NOTE — CONSULT NOTE ADULT - PROBLEM SELECTOR RECOMMENDATION 2
- hold LT4 for now given TFTs  - patient reports prior history of Graves Disease but reports developing hypothyroidism without undergoing FAYE or thyroidectomy. There are cases of individuals whose Ab switch and can go from hyperthyroidism to hypothyroidism and vice versa, thus will check Ab as described as above

## 2019-09-13 NOTE — CONSULT NOTE ADULT - PROBLEM SELECTOR RECOMMENDATION 3
- patient with history of chronic exogenous steroid use, on Prednisone for 6 months now, which can suppress HPA axis   - now on Prednisone 5 mg daily which is a physiologic dose and presumably should not cause suppression of HPA axis. Can consider checking HPA axis prior to discontinuation of steroids - presumably due to primary hyperparathyroidism  - calcium normal  - repeat PTH with vitamin D levels as outpatient

## 2019-09-13 NOTE — PROGRESS NOTE BEHAVIORAL HEALTH - NSBHCHARTREVIEWVS_PSY_A_CORE FT
ICU Vital Signs Last 24 Hrs  T(C): 36.7 (13 Sep 2019 06:23), Max: 36.9 (12 Sep 2019 19:36)  T(F): 98 (13 Sep 2019 06:23), Max: 98.5 (12 Sep 2019 19:36)  HR: 93 (13 Sep 2019 11:37) (68 - 97)  BP: 143/57 (13 Sep 2019 06:23) (125/74 - 143/57)  BP(mean): --  ABP: --  ABP(mean): --  RR: 18 (13 Sep 2019 06:23) (18 - 18)  SpO2: 96% (13 Sep 2019 11:37) (96% - 100%)

## 2019-09-13 NOTE — PROGRESS NOTE ADULT - SUBJECTIVE AND OBJECTIVE BOX
Cardiovascular Disease Progress Note    Overnight events: had an asthma attack overnight that resolved with nebulizers. currently denies any cp/sob/palps  Otherwise review of systems negative    Objective Findings:  T(C): 36.7 (19 @ 06:23), Max: 36.9 (19 @ 19:36)  HR: 77 (19 @ 06:23) (68 - 88)  BP: 143/57 (19 @ 06:23) (125/74 - 143/57)  RR: 18 (19 @ 06:23) (18 - 18)  SpO2: 100% (19 @ 06:23) (98% - 100%)  Wt(kg): --  Daily     Daily Weight in k.6 (13 Sep 2019 06:23)      Physical Exam:  Gen: NAD  HEENT: EOMI  CV: RRR, normal S1 + S2, no m/r/g  Lungs: CTAB  Abd: soft, non-tender  Ext: No edema      Laboratory Data:                        12.2   9.58  )-----------( 180      ( 13 Sep 2019 06:25 )             39.9         137  |  97<L>  |  18  ----------------------------<  131<H>  4.1   |  28  |  0.54    Ca    9.9      11 Sep 2019 13:20    TPro  6.7  /  Alb  4.0  /  TBili  0.6  /  DBili  x   /  AST  19  /  ALT  18  /  AlkPhos  89  -    PT/INR - ( 11 Sep 2019 13:20 )   PT: 11.8 SEC;   INR: 1.06          PTT - ( 11 Sep 2019 13:20 )  PTT:27.0 SEC          Inpatient Medications:  MEDICATIONS  (STANDING):  amLODIPine   Tablet 2.5 milliGRAM(s) Oral daily  aspirin  chewable 81 milliGRAM(s) Oral daily  atorvastatin 40 milliGRAM(s) Oral at bedtime  citalopram 20 milliGRAM(s) Oral daily  docusate sodium 100 milliGRAM(s) Oral three times a day  gabapentin 200 milliGRAM(s) Oral three times a day  heparin  Injectable 5000 Unit(s) SubCutaneous every 8 hours  LORazepam     Tablet 1 milliGRAM(s) Oral three times a day  losartan 100 milliGRAM(s) Oral daily  predniSONE   Tablet 5 milliGRAM(s) Oral daily  tiotropium 18 MICROgram(s) Capsule 1 Capsule(s) Inhalation daily  torsemide 20 milliGRAM(s) Oral daily      Assessment:  -recent admission for significant LE swelling due to venous insufficiency   -atypical cp with elevated hs trop and neg delta  -copd/asthma  -obesity  -melba  -anxiety  suppressed TSH    Recs:  -ekg without acute ischemic changes and CE at baseline. would defer ischemic workup at this time as unlikely to tolerate pharmacologic stress testing and any further downstream/invasive procedures if warranted at this time. will consider CTA cors in future if patient agreeable  -c/w asa and statin for elevated ASCVD risk  -c/w torsemide 20mg daily. recent TTE  withuot any acute findings.   -c/w current antihypertensives. bp adequately controlled   -recent venous duplex difficult study but no obvious dvt  -cont to wrap legs in ace bandages and keep elevated  -elevated wbc, possibly reactive. infectious workup as indicated. cont to monitor. recent tx for LE cellulitis resolved  -suppressed TSH --> surreptitious use of synthroid vs need for dose adjustment. cont to hold for now. consider endo eval. patient states sometimes her pills get stuck together and ends up taking more than intended dose  -f/u psych  -c/w BD for asthma  -DVT ppx        Over 25 minutes spent on total encounter; more than 50% of the visit was spent counseling and/or coordinating care by the attending physician.      Juan Salcedo MD   Cardiovascular Disease  [phone]

## 2019-09-13 NOTE — PROGRESS NOTE BEHAVIORAL HEALTH - CASE SUMMARY
Chart reviewed. Patient seen with Dr. Parisi. In short, she adamantly denies SI/HI. She does not want inpatient psychiatry. She is anxious about her breathing. Her thought process is goal directed, at times tangential, but mostly goal directed and linear. She does not appear to be responding to internal stimuli. She does not meet criteria for involuntary psychiatric admission. She would benefit from outpatient psychiatry. She can start at the [facility] and then be setup from there.  Defer to  to ensure safe discharge.

## 2019-09-25 ENCOUNTER — EMERGENCY (EMERGENCY)
Facility: HOSPITAL | Age: 69
LOS: 1 days | Discharge: HOME | End: 2019-09-25
Attending: EMERGENCY MEDICINE
Payer: MEDICARE

## 2019-09-25 VITALS
SYSTOLIC BLOOD PRESSURE: 171 MMHG | TEMPERATURE: 98 F | DIASTOLIC BLOOD PRESSURE: 72 MMHG | OXYGEN SATURATION: 94 % | HEART RATE: 76 BPM | RESPIRATION RATE: 17 BRPM

## 2019-09-25 VITALS
RESPIRATION RATE: 18 BRPM | HEIGHT: 63 IN | OXYGEN SATURATION: 98 % | WEIGHT: 188.05 LBS | SYSTOLIC BLOOD PRESSURE: 157 MMHG | TEMPERATURE: 98 F | DIASTOLIC BLOOD PRESSURE: 65 MMHG | HEART RATE: 84 BPM

## 2019-09-25 DIAGNOSIS — Z98.89 OTHER SPECIFIED POSTPROCEDURAL STATES: Chronic | ICD-10-CM

## 2019-09-25 DIAGNOSIS — Z90.5 ACQUIRED ABSENCE OF KIDNEY: Chronic | ICD-10-CM

## 2019-09-25 DIAGNOSIS — Z90.49 ACQUIRED ABSENCE OF OTHER SPECIFIED PARTS OF DIGESTIVE TRACT: Chronic | ICD-10-CM

## 2019-09-25 LAB
ALBUMIN SERPL ELPH-MCNC: 3.1 G/DL — LOW (ref 3.5–5)
ALP SERPL-CCNC: 84 U/L — SIGNIFICANT CHANGE UP (ref 40–120)
ALT FLD-CCNC: 27 U/L DA — SIGNIFICANT CHANGE UP (ref 10–60)
ANION GAP SERPL CALC-SCNC: 5 MMOL/L — SIGNIFICANT CHANGE UP (ref 5–17)
APTT BLD: 25.8 SEC — LOW (ref 27.5–36.3)
AST SERPL-CCNC: 10 U/L — SIGNIFICANT CHANGE UP (ref 10–40)
BILIRUB SERPL-MCNC: 0.5 MG/DL — SIGNIFICANT CHANGE UP (ref 0.2–1.2)
BUN SERPL-MCNC: 19 MG/DL — HIGH (ref 7–18)
CALCIUM SERPL-MCNC: 9.3 MG/DL — SIGNIFICANT CHANGE UP (ref 8.4–10.5)
CHLORIDE SERPL-SCNC: 106 MMOL/L — SIGNIFICANT CHANGE UP (ref 96–108)
CO2 SERPL-SCNC: 30 MMOL/L — SIGNIFICANT CHANGE UP (ref 22–31)
CREAT SERPL-MCNC: 0.61 MG/DL — SIGNIFICANT CHANGE UP (ref 0.5–1.3)
GLUCOSE SERPL-MCNC: 147 MG/DL — HIGH (ref 70–99)
HCT VFR BLD CALC: 37.3 % — SIGNIFICANT CHANGE UP (ref 34.5–45)
HGB BLD-MCNC: 11.6 G/DL — SIGNIFICANT CHANGE UP (ref 11.5–15.5)
INR BLD: 1 RATIO — SIGNIFICANT CHANGE UP (ref 0.88–1.16)
MCHC RBC-ENTMCNC: 27.6 PG — SIGNIFICANT CHANGE UP (ref 27–34)
MCHC RBC-ENTMCNC: 31.1 GM/DL — LOW (ref 32–36)
MCV RBC AUTO: 88.8 FL — SIGNIFICANT CHANGE UP (ref 80–100)
NRBC # BLD: 0 /100 WBCS — SIGNIFICANT CHANGE UP (ref 0–0)
NT-PROBNP SERPL-SCNC: 647 PG/ML — HIGH (ref 0–125)
PLATELET # BLD AUTO: 176 K/UL — SIGNIFICANT CHANGE UP (ref 150–400)
POTASSIUM SERPL-MCNC: 3.8 MMOL/L — SIGNIFICANT CHANGE UP (ref 3.5–5.3)
POTASSIUM SERPL-SCNC: 3.8 MMOL/L — SIGNIFICANT CHANGE UP (ref 3.5–5.3)
PROT SERPL-MCNC: 5.9 G/DL — LOW (ref 6–8.3)
PROTHROM AB SERPL-ACNC: 11.1 SEC — SIGNIFICANT CHANGE UP (ref 10–12.9)
RBC # BLD: 4.2 M/UL — SIGNIFICANT CHANGE UP (ref 3.8–5.2)
RBC # FLD: 15.9 % — HIGH (ref 10.3–14.5)
SODIUM SERPL-SCNC: 141 MMOL/L — SIGNIFICANT CHANGE UP (ref 135–145)
TROPONIN I SERPL-MCNC: <0.015 NG/ML — SIGNIFICANT CHANGE UP (ref 0–0.04)
WBC # BLD: 10.78 K/UL — HIGH (ref 3.8–10.5)
WBC # FLD AUTO: 10.78 K/UL — HIGH (ref 3.8–10.5)

## 2019-09-25 PROCEDURE — 85610 PROTHROMBIN TIME: CPT

## 2019-09-25 PROCEDURE — 83880 ASSAY OF NATRIURETIC PEPTIDE: CPT

## 2019-09-25 PROCEDURE — 93005 ELECTROCARDIOGRAM TRACING: CPT

## 2019-09-25 PROCEDURE — 99284 EMERGENCY DEPT VISIT MOD MDM: CPT | Mod: 25

## 2019-09-25 PROCEDURE — 99285 EMERGENCY DEPT VISIT HI MDM: CPT

## 2019-09-25 PROCEDURE — 80053 COMPREHEN METABOLIC PANEL: CPT

## 2019-09-25 PROCEDURE — 71045 X-RAY EXAM CHEST 1 VIEW: CPT | Mod: 26

## 2019-09-25 PROCEDURE — 84484 ASSAY OF TROPONIN QUANT: CPT

## 2019-09-25 PROCEDURE — 94640 AIRWAY INHALATION TREATMENT: CPT

## 2019-09-25 PROCEDURE — 71045 X-RAY EXAM CHEST 1 VIEW: CPT

## 2019-09-25 PROCEDURE — 36415 COLL VENOUS BLD VENIPUNCTURE: CPT

## 2019-09-25 PROCEDURE — 85027 COMPLETE CBC AUTOMATED: CPT

## 2019-09-25 PROCEDURE — 85730 THROMBOPLASTIN TIME PARTIAL: CPT

## 2019-09-25 RX ORDER — IPRATROPIUM/ALBUTEROL SULFATE 18-103MCG
3 AEROSOL WITH ADAPTER (GRAM) INHALATION EVERY 6 HOURS
Refills: 0 | Status: DISCONTINUED | OUTPATIENT
Start: 2019-09-25 | End: 2019-10-04

## 2019-09-25 RX ADMIN — Medication 1 MILLIGRAM(S): at 04:52

## 2019-09-25 RX ADMIN — Medication 3 MILLILITER(S): at 07:27

## 2019-09-25 NOTE — ED PROVIDER NOTE - PATIENT PORTAL LINK FT
You can access the FollowMyHealth Patient Portal offered by [facility] by registering at the following website: [email or web address]. By joining [employer]’s FollowMyHealth portal, you will also be able to view your health information using other applications (apps) compatible with our system.

## 2019-09-25 NOTE — ED PROVIDER NOTE - PRIOR HOSPITAL/ED VISITS SUMMARY FREE TEXT FOR MDM OBTAINED AND REVIEWED OLD RECORDS QUESTION
Chart review reveals patient was admitted to hospital 2 weeks ago for similar symptoms and found to have no acute respiratory/cardiac cause, reported to have severe anxiety

## 2019-09-25 NOTE — ED ADULT NURSE NOTE - OBJECTIVE STATEMENT
presented to ed c/o difficulty breathing, took nebulizer x 20 doses over a period of 20 hours, difficulty breathing resolved.  Now she has neck pain, both LE pain, anxiety.bld.drawn and sent to lab

## 2019-09-25 NOTE — ED PROVIDER NOTE - OBJECTIVE STATEMENT
69 y/o F patient with a significant PMHx of Anxiety, Asthma, Breast Cancer, COPD, DVT, Graves disease, HLD, HTN, Hypothyroid, Kidney cancer, Obesity, Sleep apnea and a significant PSHx of carpal tunnel, eye surgery, parathyroidectomy, pelvic surgery, breast biopsy, cholecystectomy, laminectomy, and nephrectomy presents to the ED with SOB. Patient reports for the last couple of days, she has been having severe SOB and reports she had to go to the ED x2 times. Patient says she presented to [facility] with similar symptoms and discharged feeling short of breath. Patient says when she arrived home, she took x20 nebulizer treatments and states it wasn't helping and called 911. Patient notes she takes Oxycodone for neck and back surgery x4 years ago. Patient says she is having whole body aches to the neck, arms, and legs and doesn't remember when she last took her Oxycodone. Pt denies fever, cough, chest pain, and any other complaints. NKDA.

## 2019-09-25 NOTE — ED ADULT TRIAGE NOTE - CHIEF COMPLAINT QUOTE
She had difficulty breathing, took nebulizer x 20 over a period of 20 hours, difficulty breathing resolved.  Now she has neck pain, both LE pain, anxiety.

## 2019-09-25 NOTE — ED PROVIDER NOTE - CLINICAL SUMMARY MEDICAL DECISION MAKING FREE TEXT BOX
Patient with history of COPD, asthma and multiple history c/o having an asthma attack. On exam, pt anxious and tearful, no evidence of respiratory distress or hypoxia. Obtain labs, chest X-ray, given Ativan and reassess. Patient with history of COPD, asthma and multiple history c/o having an asthma attack. On exam, pt anxious and tearful, no evidence of respiratory distress or hypoxia. Obtain labs, chest X-ray, given Ativan and reassess.    labs show wbc wnl, cmp unremarkable, trop neg, proBNP wnl, CXR is rotates shows possible opacity in LLL  discussed above with patient. In setting of patient denying any infectious respiratory symptoms, no retractions, no rhonchi/wheezing, no hypoxia will defer abx at this time  on reevaluation patient well-appearing, spoke to her about discharge and patient agrees with plan.  Spoke to patient's  on the phone who also agrees with plan for discharge and requests she is sent home via transportation.

## 2019-09-25 NOTE — ED PROVIDER NOTE - CARE PLAN
Principal Discharge DX:	Anxiety disorder Principal Discharge DX:	Anxiety disorder  Secondary Diagnosis:	Dyspnea

## 2019-09-25 NOTE — ED ADULT NURSE NOTE - NSIMPLEMENTINTERV_GEN_ALL_ED
Implemented All Fall Risk Interventions:  [place] to call system. Call bell, personal items and telephone within reach. Instruct patient to call for assistance. Room bathroom lighting operational. Non-slip footwear when patient is off stretcher. Physically safe environment: no spills, clutter or unnecessary equipment. Stretcher in lowest position, wheels locked, appropriate side rails in place. Provide visual cue, wrist band, yellow gown, etc. Monitor gait and stability. Monitor for mental status changes and reorient to person, place, and time. Review medications for side effects contributing to fall risk. Reinforce activity limits and safety measures with patient and family.

## 2019-09-25 NOTE — ED PROVIDER NOTE - NSFOLLOWUPINSTRUCTIONS_ED_ALL_ED_FT
Continue your current medications. Make sure to space out your nebulizer treatments to every 4 to 6 hours.  Followup with your Pulmonologist.    Return to ED if symptoms worsen.

## 2019-09-30 ENCOUNTER — INPATIENT (INPATIENT)
Facility: HOSPITAL | Age: 69
LOS: 4 days | Discharge: HOME | DRG: 543 | End: 2019-10-05
Attending: GENERAL PRACTICE | Admitting: GENERAL PRACTICE
Payer: MEDICARE

## 2019-09-30 VITALS
OXYGEN SATURATION: 98 % | HEIGHT: 60 IN | TEMPERATURE: 98 F | WEIGHT: 199.96 LBS | SYSTOLIC BLOOD PRESSURE: 134 MMHG | RESPIRATION RATE: 18 BRPM | DIASTOLIC BLOOD PRESSURE: 79 MMHG | HEART RATE: 59 BPM

## 2019-09-30 DIAGNOSIS — Z98.89 OTHER SPECIFIED POSTPROCEDURAL STATES: Chronic | ICD-10-CM

## 2019-09-30 DIAGNOSIS — S22.39XA FRACTURE OF ONE RIB, UNSPECIFIED SIDE, INITIAL ENCOUNTER FOR CLOSED FRACTURE: ICD-10-CM

## 2019-09-30 DIAGNOSIS — J96.21 ACUTE AND CHRONIC RESPIRATORY FAILURE WITH HYPOXIA: ICD-10-CM

## 2019-09-30 DIAGNOSIS — E03.9 HYPOTHYROIDISM, UNSPECIFIED: ICD-10-CM

## 2019-09-30 DIAGNOSIS — R07.9 CHEST PAIN, UNSPECIFIED: ICD-10-CM

## 2019-09-30 DIAGNOSIS — I10 ESSENTIAL (PRIMARY) HYPERTENSION: ICD-10-CM

## 2019-09-30 DIAGNOSIS — J44.9 CHRONIC OBSTRUCTIVE PULMONARY DISEASE, UNSPECIFIED: ICD-10-CM

## 2019-09-30 DIAGNOSIS — Z90.5 ACQUIRED ABSENCE OF KIDNEY: Chronic | ICD-10-CM

## 2019-09-30 DIAGNOSIS — Z90.49 ACQUIRED ABSENCE OF OTHER SPECIFIED PARTS OF DIGESTIVE TRACT: Chronic | ICD-10-CM

## 2019-09-30 LAB
ALBUMIN SERPL ELPH-MCNC: 3.7 G/DL — SIGNIFICANT CHANGE UP (ref 3.3–5)
ALP SERPL-CCNC: 85 U/L — SIGNIFICANT CHANGE UP (ref 40–120)
ALT FLD-CCNC: 19 U/L — SIGNIFICANT CHANGE UP (ref 10–45)
ANION GAP SERPL CALC-SCNC: 12 MMOL/L — SIGNIFICANT CHANGE UP (ref 5–17)
APTT BLD: 22.7 SEC — LOW (ref 27.5–36.3)
AST SERPL-CCNC: 18 U/L — SIGNIFICANT CHANGE UP (ref 10–40)
BASOPHILS # BLD AUTO: 0 K/UL — SIGNIFICANT CHANGE UP (ref 0–0.2)
BASOPHILS NFR BLD AUTO: 0.2 % — SIGNIFICANT CHANGE UP (ref 0–2)
BILIRUB SERPL-MCNC: 1.2 MG/DL — SIGNIFICANT CHANGE UP (ref 0.2–1.2)
BUN SERPL-MCNC: 17 MG/DL — SIGNIFICANT CHANGE UP (ref 7–23)
CALCIUM SERPL-MCNC: 9.3 MG/DL — SIGNIFICANT CHANGE UP (ref 8.4–10.5)
CHLORIDE SERPL-SCNC: 101 MMOL/L — SIGNIFICANT CHANGE UP (ref 96–108)
CK MB BLD-MCNC: 6.8 % — HIGH (ref 0–3.5)
CK MB CFR SERPL CALC: 3.2 NG/ML — SIGNIFICANT CHANGE UP (ref 0–3.8)
CK SERPL-CCNC: 47 U/L — SIGNIFICANT CHANGE UP (ref 25–170)
CO2 SERPL-SCNC: 26 MMOL/L — SIGNIFICANT CHANGE UP (ref 22–31)
CREAT SERPL-MCNC: 0.58 MG/DL — SIGNIFICANT CHANGE UP (ref 0.5–1.3)
EOSINOPHIL # BLD AUTO: 0 K/UL — SIGNIFICANT CHANGE UP (ref 0–0.5)
EOSINOPHIL NFR BLD AUTO: 0.3 % — SIGNIFICANT CHANGE UP (ref 0–6)
GAS PNL BLDV: SIGNIFICANT CHANGE UP
GLUCOSE SERPL-MCNC: 93 MG/DL — SIGNIFICANT CHANGE UP (ref 70–99)
HCT VFR BLD CALC: 41.1 % — SIGNIFICANT CHANGE UP (ref 34.5–45)
HGB BLD-MCNC: 12.5 G/DL — SIGNIFICANT CHANGE UP (ref 11.5–15.5)
INR BLD: 0.98 RATIO — SIGNIFICANT CHANGE UP (ref 0.88–1.16)
LYMPHOCYTES # BLD AUTO: 0.8 K/UL — LOW (ref 1–3.3)
LYMPHOCYTES # BLD AUTO: 5.2 % — LOW (ref 13–44)
MCHC RBC-ENTMCNC: 26.8 PG — LOW (ref 27–34)
MCHC RBC-ENTMCNC: 30.4 GM/DL — LOW (ref 32–36)
MCV RBC AUTO: 88 FL — SIGNIFICANT CHANGE UP (ref 80–100)
MONOCYTES # BLD AUTO: 0.7 K/UL — SIGNIFICANT CHANGE UP (ref 0–0.9)
MONOCYTES NFR BLD AUTO: 4.5 % — SIGNIFICANT CHANGE UP (ref 2–14)
NEUTROPHILS # BLD AUTO: 14.4 K/UL — HIGH (ref 1.8–7.4)
NEUTROPHILS NFR BLD AUTO: 89.9 % — HIGH (ref 43–77)
NT-PROBNP SERPL-SCNC: 188 PG/ML — SIGNIFICANT CHANGE UP (ref 0–300)
PLATELET # BLD AUTO: 180 K/UL — SIGNIFICANT CHANGE UP (ref 150–400)
POTASSIUM SERPL-MCNC: 4.4 MMOL/L — SIGNIFICANT CHANGE UP (ref 3.5–5.3)
POTASSIUM SERPL-SCNC: 4.4 MMOL/L — SIGNIFICANT CHANGE UP (ref 3.5–5.3)
PROT SERPL-MCNC: 6.1 G/DL — SIGNIFICANT CHANGE UP (ref 6–8.3)
PROTHROM AB SERPL-ACNC: 11.3 SEC — SIGNIFICANT CHANGE UP (ref 10–12.9)
RBC # BLD: 4.67 M/UL — SIGNIFICANT CHANGE UP (ref 3.8–5.2)
RBC # FLD: 14.7 % — HIGH (ref 10.3–14.5)
SODIUM SERPL-SCNC: 139 MMOL/L — SIGNIFICANT CHANGE UP (ref 135–145)
T3 SERPL-MCNC: 48 NG/DL — LOW (ref 80–200)
T4 AB SER-ACNC: 5.9 UG/DL — SIGNIFICANT CHANGE UP (ref 4.6–12)
TROPONIN T, HIGH SENSITIVITY RESULT: 46 NG/L — SIGNIFICANT CHANGE UP (ref 0–51)
TROPONIN T, HIGH SENSITIVITY RESULT: 57 NG/L — HIGH (ref 0–51)
TSH SERPL-MCNC: 0.64 UIU/ML — SIGNIFICANT CHANGE UP (ref 0.27–4.2)
WBC # BLD: 16 K/UL — HIGH (ref 3.8–10.5)
WBC # FLD AUTO: 16 K/UL — HIGH (ref 3.8–10.5)

## 2019-09-30 PROCEDURE — 99285 EMERGENCY DEPT VISIT HI MDM: CPT | Mod: GC

## 2019-09-30 PROCEDURE — 71045 X-RAY EXAM CHEST 1 VIEW: CPT | Mod: 26

## 2019-09-30 PROCEDURE — 93010 ELECTROCARDIOGRAM REPORT: CPT

## 2019-09-30 PROCEDURE — 71275 CT ANGIOGRAPHY CHEST: CPT | Mod: 26

## 2019-09-30 PROCEDURE — 93970 EXTREMITY STUDY: CPT | Mod: 26

## 2019-09-30 RX ORDER — DOCUSATE SODIUM 100 MG
100 CAPSULE ORAL THREE TIMES A DAY
Refills: 0 | Status: DISCONTINUED | OUTPATIENT
Start: 2019-09-30 | End: 2019-10-05

## 2019-09-30 RX ORDER — FOLIC ACID 0.8 MG
1 TABLET ORAL DAILY
Refills: 0 | Status: DISCONTINUED | OUTPATIENT
Start: 2019-09-30 | End: 2019-10-05

## 2019-09-30 RX ORDER — ASPIRIN/CALCIUM CARB/MAGNESIUM 324 MG
81 TABLET ORAL DAILY
Refills: 0 | Status: DISCONTINUED | OUTPATIENT
Start: 2019-09-30 | End: 2019-10-05

## 2019-09-30 RX ORDER — LEVOTHYROXINE SODIUM 125 MCG
100 TABLET ORAL DAILY
Refills: 0 | Status: DISCONTINUED | OUTPATIENT
Start: 2019-09-30 | End: 2019-10-05

## 2019-09-30 RX ORDER — CITALOPRAM 10 MG/1
20 TABLET, FILM COATED ORAL DAILY
Refills: 0 | Status: DISCONTINUED | OUTPATIENT
Start: 2019-09-30 | End: 2019-10-01

## 2019-09-30 RX ORDER — IPRATROPIUM/ALBUTEROL SULFATE 18-103MCG
3 AEROSOL WITH ADAPTER (GRAM) INHALATION EVERY 6 HOURS
Refills: 0 | Status: DISCONTINUED | OUTPATIENT
Start: 2019-09-30 | End: 2019-10-02

## 2019-09-30 RX ORDER — LOSARTAN POTASSIUM 100 MG/1
100 TABLET, FILM COATED ORAL DAILY
Refills: 0 | Status: DISCONTINUED | OUTPATIENT
Start: 2019-09-30 | End: 2019-10-05

## 2019-09-30 RX ORDER — ENOXAPARIN SODIUM 100 MG/ML
40 INJECTION SUBCUTANEOUS DAILY
Refills: 0 | Status: DISCONTINUED | OUTPATIENT
Start: 2019-09-30 | End: 2019-10-05

## 2019-09-30 RX ORDER — ATORVASTATIN CALCIUM 80 MG/1
40 TABLET, FILM COATED ORAL AT BEDTIME
Refills: 0 | Status: DISCONTINUED | OUTPATIENT
Start: 2019-09-30 | End: 2019-10-05

## 2019-09-30 RX ORDER — AMLODIPINE BESYLATE 2.5 MG/1
2.5 TABLET ORAL DAILY
Refills: 0 | Status: DISCONTINUED | OUTPATIENT
Start: 2019-09-30 | End: 2019-10-01

## 2019-09-30 RX ORDER — ASPIRIN/CALCIUM CARB/MAGNESIUM 324 MG
324 TABLET ORAL ONCE
Refills: 0 | Status: COMPLETED | OUTPATIENT
Start: 2019-09-30 | End: 2019-09-30

## 2019-09-30 RX ORDER — ACETAMINOPHEN 500 MG
650 TABLET ORAL EVERY 6 HOURS
Refills: 0 | Status: DISCONTINUED | OUTPATIENT
Start: 2019-09-30 | End: 2019-10-05

## 2019-09-30 RX ORDER — IPRATROPIUM/ALBUTEROL SULFATE 18-103MCG
3 AEROSOL WITH ADAPTER (GRAM) INHALATION ONCE
Refills: 0 | Status: COMPLETED | OUTPATIENT
Start: 2019-09-30 | End: 2019-09-30

## 2019-09-30 RX ORDER — GABAPENTIN 400 MG/1
200 CAPSULE ORAL THREE TIMES A DAY
Refills: 0 | Status: DISCONTINUED | OUTPATIENT
Start: 2019-09-30 | End: 2019-10-05

## 2019-09-30 RX ORDER — LANOLIN ALCOHOL/MO/W.PET/CERES
3 CREAM (GRAM) TOPICAL AT BEDTIME
Refills: 0 | Status: DISCONTINUED | OUTPATIENT
Start: 2019-09-30 | End: 2019-10-05

## 2019-09-30 RX ADMIN — Medication 1 MILLIGRAM(S): at 13:31

## 2019-09-30 RX ADMIN — Medication 3 MILLILITER(S): at 15:24

## 2019-09-30 RX ADMIN — ATORVASTATIN CALCIUM 40 MILLIGRAM(S): 80 TABLET, FILM COATED ORAL at 23:08

## 2019-09-30 RX ADMIN — Medication 0.5 MILLIGRAM(S): at 20:13

## 2019-09-30 RX ADMIN — GABAPENTIN 200 MILLIGRAM(S): 400 CAPSULE ORAL at 23:09

## 2019-09-30 RX ADMIN — Medication 324 MILLIGRAM(S): at 17:57

## 2019-09-30 RX ADMIN — Medication 3 MILLILITER(S): at 22:11

## 2019-09-30 NOTE — ED PROVIDER NOTE - PHYSICAL EXAMINATION
Gen: anxious appearing  Head: NCAT  HEENT: PERRL, MMM, normal conjunctiva, anicteric, neck supple  Lung: CTAB, no adventitious sounds, no tachypnea  CV: RRR, no murmurs  Abd: soft, NTND, no rebound or guarding, no CVAT  MSK: 2+ b/l edema, no visible deformities  Neuro: Moving all extremity grossly  Skin: Warm and dry, no evidence of rash  Psych: normal mood and affect Gen: anxious appearing  Head: NCAT  HEENT: PERRL, MMM, normal conjunctiva, anicteric, neck supple  Lung: CTAB, no adventitious sounds, no tachypnea  CV: RRR, no murmurs  Abd: soft, NTND, no rebound or guarding, no CVAT  MSK: 2+ b/l edema, no visible deformities  Neuro: Moving all extremity grossly  Skin: Warm and dry, no evidence of rash  Psych: normal mood and affect  Attending Sheri Blank: Gen: NAD, heent: atrauamtic, eomi, perrla, poor dentition, mmm, op pink, uvula midline, neck; nttp, no nuchal rigidity, chest: nttp, no crepitus, cv: rrr, no murmurs, lungs: ctab, abd: soft, nontender, nondistended, no peritoneal signs, +BS, no guarding, ext: wwp, b/l LE pitting edema, skin: ecchymose, neuro: awake and alert, following commands, speech clear, sensation and strength intact, no focal deficits

## 2019-09-30 NOTE — ED PROVIDER NOTE - ATTENDING CONTRIBUTION TO CARE
Attending MD Sehri Blank:  I personally have seen and examined this patient.  Resident note reviewed and agree on plan of care and except where noted.  See HPI, PE, and MDM for details.
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
## 2019-09-30 NOTE — ED ADULT TRIAGE NOTE - CHIEF COMPLAINT QUOTE
Detail Level: Zone Patient has multiple medical complaints.  B/L hand pain, weakness and may have taken less ativan than she normally takes.  Also having chest pain and when she speaks she gets out of breath

## 2019-09-30 NOTE — ED PROVIDER NOTE - CLINICAL SUMMARY MEDICAL DECISION MAKING FREE TEXT BOX
Multiple constitution of sx w/ nonfocal upper extremity weakness/pain, tremors, cp, sob in setting of anxiety not recently tx and recent evals for similar presentations. Clinically nontoxic and not in respiratory distrses speaking full sentences. Will r/o acs w/ trop and cxr, and given prescribed ativan and reassess Multiple constitution of sx w/ nonfocal upper extremity weakness/pain, tremors, cp, sob in setting of anxiety not recently tx and recent evals for similar presentations. Clinically nontoxic and not in respiratory distrses speaking full sentences. Will r/o acs w/ trop and cxr, and given prescribed ativan and reassess  Attending Sheri Blank: 69 y/o female with multiple medical issues including COPD, delusional disorder presenting with sob. upon arrival pt hemodynamicaly stable. is on chronic prednisone reportedly. on exam pt with multiple areas of ecchymoses, unclear cause. pt with lower ext pitting edema. duplex ordered to furthere valuation. lungs ctab. pt does report sob. labs showed evidence of mildly elevated troponin. consider acs equivalent with sob. pt given apsirin denies any current chest pain. cta ordered to further evaluate. pt will need admission

## 2019-09-30 NOTE — ED ADULT NURSE NOTE - NSIMPLEMENTINTERV_GEN_ALL_ED
Implemented All Fall Risk Interventions:  [place] to call system. Call bell, personal items and telephone within reach. Instruct patient to call for assistance. Room bathroom lighting operational. Non-slip footwear when patient is off stretcher. Physically safe environment: no spills, clutter or unnecessary equipment. Stretcher in lowest position, wheels locked, appropriate side rails in place. Provide visual cue, wrist band, yellow gown, etc. Monitor gait and stability. Monitor for mental status changes and reorient to person, place, and time. Review medications for side effects contributing to fall risk. Reinforce activity limits and safety measures with patient and family.

## 2019-09-30 NOTE — ED ADULT NURSE NOTE - CHIEF COMPLAINT QUOTE
Patient has multiple medical complaints.  B/L hand pain, weakness and may have taken less ativan than she normally takes.  Also having chest pain and when she speaks she gets out of breath

## 2019-09-30 NOTE — H&P ADULT - PROBLEM/PLAN-1
How Severe Is Your Rash?: mild
Is This A New Presentation, Or A Follow-Up?: Rash
DISPLAY PLAN FREE TEXT

## 2019-09-30 NOTE — H&P ADULT - ASSESSMENT
67 y/o F patient with a significant PMHx of Anxiety, Asthma, Breast Cancer, COPD, DVT, Graves disease, HLD, HTN, Hypothyroid, Kidney cancer, Obesity, Sleep apnea p/w 2 days of chest tightness, blurry vision, b/l hand pain/weakness/tremors and sob. Has had multiple evaluations and admission for similar presentation recently with neg w/u. Does not believe it is her asthma and has not tried to use her nebulizer and has not taken her prescribed ativan in several weeks because she cannot find the pills. Also endorses b/l leg swelling.

## 2019-09-30 NOTE — ED PROVIDER NOTE - NS ED ROS FT
CONSTITUTIONAL: No fevers, no chills, no lightheadedness, no dizziness  Eyes: see hpi  Ears: no ear drainage, no ear pain  Nose: no nasal congestion  Mouth/Throat: no sore throat  CV: no palpitations  PULM: no cough  GI: No n/v/d, no abd pain  : no dysuria, no hematuria  SKIN: no rashes.  NEURO: no headache, no focal weakness or numbness  PSYCHIATRIC: no known mental health issues.

## 2019-09-30 NOTE — H&P ADULT - PROBLEM SELECTOR PLAN 1
Presently CP free R/O ACS . trop elevated. cardiology to follow and ischemic work up per him . CTA noted
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
## 2019-09-30 NOTE — ED PROVIDER NOTE - OBJECTIVE STATEMENT
69 y/o F patient with a significant PMHx of Anxiety, Asthma, Breast Cancer, COPD, DVT, Graves disease, HLD, HTN, Hypothyroid, Kidney cancer, Obesity, Sleep apnea p/w 2 days of chest tightness, blurry vision, b/l hand pain/weakness/tremors and sob. Has had multiple evaluations and admission for similar presentation recently with neg w/u. Does not believe it is her asthma and has not tried to use her nebulizer and has not taken her prescribed ativan in several weeks because she cannot find the pills. Also endorses b/l leg swelling.

## 2019-10-01 ENCOUNTER — OUTPATIENT (OUTPATIENT)
Dept: OUTPATIENT SERVICES | Facility: HOSPITAL | Age: 69
LOS: 1 days | End: 2019-10-01
Payer: MEDICARE

## 2019-10-01 DIAGNOSIS — Z98.89 OTHER SPECIFIED POSTPROCEDURAL STATES: Chronic | ICD-10-CM

## 2019-10-01 DIAGNOSIS — Z71.89 OTHER SPECIFIED COUNSELING: ICD-10-CM

## 2019-10-01 DIAGNOSIS — Z90.5 ACQUIRED ABSENCE OF KIDNEY: Chronic | ICD-10-CM

## 2019-10-01 DIAGNOSIS — F41.9 ANXIETY DISORDER, UNSPECIFIED: ICD-10-CM

## 2019-10-01 DIAGNOSIS — Z90.49 ACQUIRED ABSENCE OF OTHER SPECIFIED PARTS OF DIGESTIVE TRACT: Chronic | ICD-10-CM

## 2019-10-01 DIAGNOSIS — F05 DELIRIUM DUE TO KNOWN PHYSIOLOGICAL CONDITION: ICD-10-CM

## 2019-10-01 LAB
APPEARANCE UR: CLEAR — SIGNIFICANT CHANGE UP
BACTERIA # UR AUTO: NEGATIVE — SIGNIFICANT CHANGE UP
BILIRUB UR-MCNC: NEGATIVE — SIGNIFICANT CHANGE UP
COLOR SPEC: COLORLESS — SIGNIFICANT CHANGE UP
DIFF PNL FLD: ABNORMAL
EPI CELLS # UR: 1 /HPF — SIGNIFICANT CHANGE UP
GLUCOSE UR QL: NEGATIVE — SIGNIFICANT CHANGE UP
HYALINE CASTS # UR AUTO: 0 /LPF — SIGNIFICANT CHANGE UP (ref 0–2)
KETONES UR-MCNC: NEGATIVE — SIGNIFICANT CHANGE UP
LEUKOCYTE ESTERASE UR-ACNC: NEGATIVE — SIGNIFICANT CHANGE UP
NITRITE UR-MCNC: NEGATIVE — SIGNIFICANT CHANGE UP
PH UR: 7 — SIGNIFICANT CHANGE UP (ref 5–8)
PROT UR-MCNC: NEGATIVE — SIGNIFICANT CHANGE UP
RBC CASTS # UR COMP ASSIST: 2 /HPF — SIGNIFICANT CHANGE UP (ref 0–4)
SP GR SPEC: 1.01 — LOW (ref 1.01–1.02)
UROBILINOGEN FLD QL: NEGATIVE — SIGNIFICANT CHANGE UP
WBC UR QL: 0 /HPF — SIGNIFICANT CHANGE UP (ref 0–5)

## 2019-10-01 PROCEDURE — G9001: CPT

## 2019-10-01 PROCEDURE — 99222 1ST HOSP IP/OBS MODERATE 55: CPT

## 2019-10-01 PROCEDURE — 93010 ELECTROCARDIOGRAM REPORT: CPT

## 2019-10-01 PROCEDURE — 99232 SBSQ HOSP IP/OBS MODERATE 35: CPT

## 2019-10-01 RX ORDER — CITALOPRAM 10 MG/1
30 TABLET, FILM COATED ORAL DAILY
Refills: 0 | Status: DISCONTINUED | OUTPATIENT
Start: 2019-10-01 | End: 2019-10-05

## 2019-10-01 RX ORDER — IPRATROPIUM/ALBUTEROL SULFATE 18-103MCG
3 AEROSOL WITH ADAPTER (GRAM) INHALATION ONCE
Refills: 0 | Status: COMPLETED | OUTPATIENT
Start: 2019-10-01 | End: 2019-10-01

## 2019-10-01 RX ORDER — AMLODIPINE BESYLATE 2.5 MG/1
5 TABLET ORAL DAILY
Refills: 0 | Status: DISCONTINUED | OUTPATIENT
Start: 2019-10-01 | End: 2019-10-05

## 2019-10-01 RX ORDER — INFLUENZA VIRUS VACCINE 15; 15; 15; 15 UG/.5ML; UG/.5ML; UG/.5ML; UG/.5ML
0.5 SUSPENSION INTRAMUSCULAR ONCE
Refills: 0 | Status: DISCONTINUED | OUTPATIENT
Start: 2019-10-01 | End: 2019-10-05

## 2019-10-01 RX ORDER — OXYCODONE HYDROCHLORIDE 5 MG/1
5 TABLET ORAL EVERY 6 HOURS
Refills: 0 | Status: DISCONTINUED | OUTPATIENT
Start: 2019-10-01 | End: 2019-10-05

## 2019-10-01 RX ADMIN — Medication 3 MILLILITER(S): at 00:25

## 2019-10-01 RX ADMIN — Medication 20 MILLIGRAM(S): at 06:09

## 2019-10-01 RX ADMIN — Medication 1 TABLET(S): at 11:15

## 2019-10-01 RX ADMIN — Medication 3 MILLIGRAM(S): at 00:25

## 2019-10-01 RX ADMIN — Medication 1 MILLIGRAM(S): at 11:16

## 2019-10-01 RX ADMIN — Medication 3 MILLILITER(S): at 06:09

## 2019-10-01 RX ADMIN — Medication 5 MILLIGRAM(S): at 06:09

## 2019-10-01 RX ADMIN — Medication 1 MILLIGRAM(S): at 11:13

## 2019-10-01 RX ADMIN — OXYCODONE HYDROCHLORIDE 5 MILLIGRAM(S): 5 TABLET ORAL at 20:15

## 2019-10-01 RX ADMIN — Medication 1 MILLIGRAM(S): at 06:36

## 2019-10-01 RX ADMIN — Medication 100 MICROGRAM(S): at 06:09

## 2019-10-01 RX ADMIN — Medication 3 MILLILITER(S): at 23:12

## 2019-10-01 RX ADMIN — Medication 3 MILLILITER(S): at 18:15

## 2019-10-01 RX ADMIN — Medication 3 MILLILITER(S): at 11:15

## 2019-10-01 RX ADMIN — AMLODIPINE BESYLATE 5 MILLIGRAM(S): 2.5 TABLET ORAL at 11:18

## 2019-10-01 RX ADMIN — GABAPENTIN 200 MILLIGRAM(S): 400 CAPSULE ORAL at 20:17

## 2019-10-01 RX ADMIN — OXYCODONE HYDROCHLORIDE 5 MILLIGRAM(S): 5 TABLET ORAL at 12:26

## 2019-10-01 RX ADMIN — Medication 3 MILLILITER(S): at 01:27

## 2019-10-01 RX ADMIN — Medication 0.5 MILLIGRAM(S): at 17:36

## 2019-10-01 RX ADMIN — Medication 1 MILLIGRAM(S): at 15:39

## 2019-10-01 RX ADMIN — OXYCODONE HYDROCHLORIDE 5 MILLIGRAM(S): 5 TABLET ORAL at 21:00

## 2019-10-01 RX ADMIN — Medication 100 MILLIGRAM(S): at 20:16

## 2019-10-01 RX ADMIN — GABAPENTIN 200 MILLIGRAM(S): 400 CAPSULE ORAL at 13:12

## 2019-10-01 RX ADMIN — ATORVASTATIN CALCIUM 40 MILLIGRAM(S): 80 TABLET, FILM COATED ORAL at 20:16

## 2019-10-01 RX ADMIN — Medication 0.5 MILLIGRAM(S): at 01:08

## 2019-10-01 RX ADMIN — Medication 81 MILLIGRAM(S): at 11:15

## 2019-10-01 RX ADMIN — OXYCODONE HYDROCHLORIDE 5 MILLIGRAM(S): 5 TABLET ORAL at 13:26

## 2019-10-01 RX ADMIN — CITALOPRAM 20 MILLIGRAM(S): 10 TABLET, FILM COATED ORAL at 11:15

## 2019-10-01 RX ADMIN — ENOXAPARIN SODIUM 40 MILLIGRAM(S): 100 INJECTION SUBCUTANEOUS at 11:16

## 2019-10-01 RX ADMIN — AMLODIPINE BESYLATE 2.5 MILLIGRAM(S): 2.5 TABLET ORAL at 06:09

## 2019-10-01 RX ADMIN — GABAPENTIN 200 MILLIGRAM(S): 400 CAPSULE ORAL at 06:09

## 2019-10-01 RX ADMIN — LOSARTAN POTASSIUM 100 MILLIGRAM(S): 100 TABLET, FILM COATED ORAL at 06:09

## 2019-10-01 RX ADMIN — Medication 650 MILLIGRAM(S): at 21:00

## 2019-10-01 RX ADMIN — Medication 650 MILLIGRAM(S): at 20:15

## 2019-10-01 NOTE — PROGRESS NOTE ADULT - ASSESSMENT
67 y/o F patient with a significant PMHx of Anxiety, Asthma, Breast Cancer, COPD, DVT, Graves disease, HLD, HTN, Hypothyroid, Kidney cancer, Obesity, Sleep apnea p/w 2 days of chest tightness, blurry vision, b/l hand pain/weakness/tremors and sob. Has had multiple evaluations and admission for similar presentation recently with neg w/u. Does not believe it is her asthma and has not tried to use her nebulizer and has not taken her prescribed ativan in several weeks because she cannot find the pills. Also endorses b/l leg swelling.      Problem/Plan - 1:  ·  Problem: Chest pain.  Plan: Presently CP free R/O ACS . trop elevated. Cardiology to follow and ischemic work up per him . CTA noted. Refusing Cardiac CT .      Problem/Plan - 2:  ·  Problem: Acute on chronic respiratory failure with hypoxia and hypercapnia.  Plan: On Oxygen NC .      Problem/Plan - 3:  ·  Problem: COPD (chronic obstructive pulmonary disease).  Plan: Neb Rx .      Problem/Plan - 4:  ·  Problem: Hypertension.  Plan: BP meds with hold parameters.      Problem/Plan - 5:  ·  Problem: Hypothyroid.  Plan: Synthroid and will check TFT.      Problem/Plan - 6:  Problem: Rib fractures. Plan: Exact cause not know . Denies a fall  consulted .     Problem/Plan - 7:  Problem: Anxiety & Depression . Plan: Psychiatry helping.
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
## 2019-10-01 NOTE — BEHAVIORAL HEALTH ASSESSMENT NOTE - RISK ASSESSMENT
Pt is a increased risk due to her underlying mood and anxiety d/o. Other risk factors include possible delirium in the setting of medical illness, chronic medical illness, psychosocial difficulties, and possible h/o trauma. Protective factors include dependent child, no current S/H/I/I/P, no access to firearms, and no history of SA. Low Acute Suicide Risk Pt is at increased risk due to her history of depression and anxiety; other risk factors include prior SA, chronic medical illness, psychosocial difficulties, and possible h/o trauma. Protective factors include dependent child, no current S/H/I/I/P, and no access to firearms.
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
## 2019-10-01 NOTE — CHART NOTE - NSCHARTNOTEFT_GEN_A_CORE
CC: chest pain      HPI:  Called by RN to evaluate patient for complaints of chest pain.   Patient denied headache, dizziness, CP, SOB, abdominal  pain, N/V/D, numbness/tingling, extremity weakness, dysuria.         ROS:  CONSTITUTIONAL:  No fever, chills, rigors  CARDIOVASCULAR:  No chest pain or palpitations  RESPIRATORY:   No SOB, cough, dyspnea on exertion.  No wheezing  GASTROINTESTINAL:  No abd pain, N/V, diarrhea/constipation  EXTREMITIES:  No swelling or joint pain  GENITOURINARY:  No burning on urination, increased frequency or urgency.  No flank pain  NEUROLOGIC:  No HA, visual disturbances  SKIN: No rashes        PAST MEDICAL & SURGICAL HISTORY:  Asthma  Sleep apnea  Obesity  DVT (deep venous thrombosis): history of- not presently on A/C  Hypothyroid  COPD (chronic obstructive pulmonary disease)  Breast cancer in situ, left  Kidney cancer, primary, with metastasis from kidney to other site, left: LEft sided- s/p nephrectomy only- no chemo or RT  Graves disease  Anxiety  Hyperlipidemia  Hypertension  S/P laminectomy: now bed and wheelchair ridden with severe pain  History of parathyroidectomy  History of carpal tunnel release: right wrist  History of eye surgery: 7 surgeries secondary to grave&#x27;s disease  History of pelvic surgery: Pelvic Sling  S/P breast biopsy: left  S/p nephrectomy: left  S/P cholecystectomy          Vital Signs Last 24 Hrs  T(C): 36.9 (30 Sep 2019 23:20), Max: 36.9 (30 Sep 2019 17:53)  T(F): 98.5 (30 Sep 2019 23:20), Max: 98.5 (30 Sep 2019 23:20)  HR: 65 (01 Oct 2019 00:52) (54 - 84)  BP: 165/74 (01 Oct 2019 00:52) (134/79 - 178/87)  BP(mean): 104 (30 Sep 2019 19:39) (104 - 104)  RR: 18 (01 Oct 2019 00:52) (18 - 20)  SpO2: 100% (01 Oct 2019 00:52) (96% - 100%)      Physical Exam:  General: WN/WD NAD, AOx3, nontoxic appearing  Head:  NC/AT  CV: RRR, S1S2   Respiratory: CTA B/L, nonlabored  Abdominal: (+) bowel sounds x4. Soft, NT, ND, no palpable mass, no guarding, or rebound tenderness  Genitourinary: ? Jayme   MSK: No BLLE edema, + peripheral pulses, FROM all 4 extremity  Skin: (+) warm, dry   Psych: Appropriate affect       Labs:                        12.5   16.0  )-----------( 180      ( 30 Sep 2019 13:40 )             41.1     09-30    139  |  101  |  17  ----------------------------<  93  4.4   |  26  |  0.58    Ca    9.3      30 Sep 2019 13:40  Mg     2.1     09-30    TPro  6.1  /  Alb  3.7  /  TBili  1.2  /  DBili  x   /  AST  18  /  ALT  19  /  AlkPhos  85  09-30              Radiology:  < from: CT Angio Chest w/ IV Cont (09.30.19 @ 18:44) >      IMPRESSION:     No pulmonary embolism.    Acute appearing right anterior third rib fracture. Multiple bilateral   subacute to chronic rib fractures.    Emphysema    < end of copied text >              Assessment & Plan:  HPI:  69 y/o F patient with a significant PMHx of Anxiety, Asthma, Breast Cancer, COPD, DVT, Graves disease, HLD, HTN, Hypothyroid, Kidney cancer, Obesity, Sleep apnea p/w 2 days of chest tightness, blurry vision, b/l hand pain/weakness/tremors and sob. Has had multiple evaluations and admission for similar presentation recently with neg w/u. Does not believe it is her asthma and has not tried to use her nebulizer and has not taken her prescribed ativan in several weeks because she cannot find the pills. Also endorses b/l leg swelling. (30 Sep 2019 19:39)        -  -  -Will continue to closely monitor patient/vitals  -Primary Team to follow up in AM, attending to follow       Renu Esteves PA-C  Dept of Medicine CC: chest pain      HPI:  Called by RN to evaluate patient for complaints of chest pain.   Patient denied headache, dizziness, CP, SOB, abdominal  pain, N/V/D, numbness/tingling, extremity weakness, dysuria.         ROS:  CONSTITUTIONAL:  No fever, chills, rigors  CARDIOVASCULAR:  No chest pain or palpitations  RESPIRATORY:   No SOB, cough, dyspnea on exertion.  No wheezing  GASTROINTESTINAL:  No abd pain, N/V, diarrhea/constipation  EXTREMITIES:  No swelling or joint pain  GENITOURINARY:  No burning on urination, increased frequency or urgency.  No flank pain  NEUROLOGIC:  No HA, visual disturbances  SKIN: No rashes        PAST MEDICAL & SURGICAL HISTORY:  Asthma  Sleep apnea  Obesity  DVT (deep venous thrombosis): history of- not presently on A/C  Hypothyroid  COPD (chronic obstructive pulmonary disease)  Breast cancer in situ, left  Kidney cancer, primary, with metastasis from kidney to other site, left: LEft sided- s/p nephrectomy only- no chemo or RT  Graves disease  Anxiety  Hyperlipidemia  Hypertension  S/P laminectomy: now bed and wheelchair ridden with severe pain  History of parathyroidectomy  History of carpal tunnel release: right wrist  History of eye surgery: 7 surgeries secondary to grave&#x27;s disease  History of pelvic surgery: Pelvic Sling  S/P breast biopsy: left  S/p nephrectomy: left  S/P cholecystectomy        Vital Signs Last 24 Hrs  T(C): 36.9 (30 Sep 2019 23:20), Max: 36.9 (30 Sep 2019 17:53)  T(F): 98.5 (30 Sep 2019 23:20), Max: 98.5 (30 Sep 2019 23:20)  HR: 65 (01 Oct 2019 00:52) (54 - 84)  BP: 165/74 (01 Oct 2019 00:52) (134/79 - 178/87)  BP(mean): 104 (30 Sep 2019 19:39) (104 - 104)  RR: 18 (01 Oct 2019 00:52) (18 - 20)  SpO2: 100% (01 Oct 2019 00:52) (96% - 100%)      Physical Exam:  General: Obese female laying in bed, NAD, AOx2-3, nontoxic appearing  Head:  NC/AT  CV: RRR, S1S2   Respiratory: CTA B/L, nonlabored  MSK: (+) 2+ BLLE edema, + peripheral pulses, FROM all 4 extremity  Skin: (+) warm, dry   Psych: Agitated at times      Labs:                        12.5   16.0  )-----------( 180      ( 30 Sep 2019 13:40 )             41.1     09-30    139  |  101  |  17  ----------------------------<  93  4.4   |  26  |  0.58    Ca    9.3      30 Sep 2019 13:40  Mg     2.1     09-30    TPro  6.1  /  Alb  3.7  /  TBili  1.2  /  DBili  x   /  AST  18  /  ALT  19  /  AlkPhos  85  09-30              Radiology:  < from: CT Angio Chest w/ IV Cont (09.30.19 @ 18:44) >  IMPRESSION:   No pulmonary embolism.  Acute appearing right anterior third rib fracture. Multiple bilateral   subacute to chronic rib fractures.  Emphysema  < end of copied text >              Assessment & Plan:  69 y/o F patient with a significant PMHx of Anxiety, Asthma, Breast Cancer, COPD, DVT, Graves disease, HLD, HTN, Hypothyroid, Kidney cancer, Obesity, Sleep apnea p/w 2 days of chest tightness, blurry vision, b/l hand pain/weakness/tremors and sob. Has had multiple evaluations and admission for similar presentation recently with neg w/u. Does not believe it is her asthma and has not tried to use her nebulizer and has not taken her prescribed ativan in several weeks because she cannot find the pills. Also endorses b/l leg swelling. (30 Sep 2019 19:39)      #Chest pain/tightness; ?2/2 asthma, r/o ACS  -Vital signs hemodynamically stable  -EKG: Sinus bradycardia, HR 50 BPM. No acute ST/T segment changes noted when compared with prior EKG in [place] from 9/25.   -STAT HsT ordered but patient became agitated and refused to have blood drawn, will try with AM labs  -Telemetry without events, currently sinus cassandra 50s. Continue to monitor on telemetry  -Additional Duoneb treatment x1 as well as ativan 0.5mg PO ordered  -Cardiology evaluation is pending  -Will continue to closely monitor patient/vitals  -Primary Team to follow up in AM, attending to follow       Renu Esteves PA-C  Dept of Medicine CC: chest pain      HPI:  Called by RN to evaluate patient for complaints of chest pain. Patient seen and assessed at bedside, NAD, alert and awake. She complained of chest pain/tightness that has been ongoing since admission to the hospital. She stated "its my asthma!" and stated it is similar to previous episodes. Patient uses 2LNC at home and stated she will usually administer a Duoneb treatment and take Ativan when similar episodes occur.  She denied headache, dizziness, palpitations, acute dyspnea, nausea, vomiting, or abdominal pain. Pain not reproducible on exam. During the exam/interview patient became agitated, yelling "leave me alone" and "let me sleep." She refused to have bloodwork drawn.         ROS:  CONSTITUTIONAL:  No fever, chills, rigors  CARDIOVASCULAR:  (+) chest pain. NO palpitations  RESPIRATORY:   NO cough, wheezing  GASTROINTESTINAL:  No abd pain, N/V/D  NEUROLOGIC:  No HA, visual disturbances  SKIN: No rashes        PAST MEDICAL & SURGICAL HISTORY:  Asthma  Sleep apnea  Obesity  DVT (deep venous thrombosis): history of- not presently on A/C  Hypothyroid  COPD (chronic obstructive pulmonary disease)  Breast cancer in situ, left  Kidney cancer, primary, with metastasis from kidney to other site, left: LEft sided- s/p nephrectomy only- no chemo or RT  Graves disease  Anxiety  Hyperlipidemia  Hypertension  S/P laminectomy: now bed and wheelchair ridden with severe pain  History of parathyroidectomy  History of carpal tunnel release: right wrist  History of eye surgery: 7 surgeries secondary to grave&#x27;s disease  History of pelvic surgery: Pelvic Sling  S/P breast biopsy: left  S/p nephrectomy: left  S/P cholecystectomy          Vital Signs Last 24 Hrs  T(C): 36.9 (30 Sep 2019 23:20), Max: 36.9 (30 Sep 2019 17:53)  T(F): 98.5 (30 Sep 2019 23:20), Max: 98.5 (30 Sep 2019 23:20)  HR: 65 (01 Oct 2019 00:52) (54 - 84)  BP: 165/74 (01 Oct 2019 00:52) (134/79 - 178/87)  BP(mean): 104 (30 Sep 2019 19:39) (104 - 104)  RR: 18 (01 Oct 2019 00:52) (18 - 20)  SpO2: 100% (01 Oct 2019 00:52) (96% - 100%)      Physical Exam:  General: Obese female laying in bed, NAD, AOx2-3, nontoxic appearing  Head:  NC/AT  CV: RRR, S1S2   Respiratory: CTA B/L, nonlabored  MSK: (+) 2+ BLLE edema, + peripheral pulses, FROM all 4 extremity  Skin: (+) warm, dry   Psych: Agitated at times      Labs:                        12.5   16.0  )-----------( 180      ( 30 Sep 2019 13:40 )             41.1     09-30    139  |  101  |  17  ----------------------------<  93  4.4   |  26  |  0.58    Ca    9.3      30 Sep 2019 13:40  Mg     2.1     09-30    TPro  6.1  /  Alb  3.7  /  TBili  1.2  /  DBili  x   /  AST  18  /  ALT  19  /  AlkPhos  85  09-30              Radiology:  < from: CT Angio Chest w/ IV Cont (09.30.19 @ 18:44) >  IMPRESSION:   No pulmonary embolism.  Acute appearing right anterior third rib fracture. Multiple bilateral   subacute to chronic rib fractures.  Emphysema  < end of copied text >              Assessment & Plan:  69 y/o F patient with a significant PMHx of Anxiety, Asthma, Breast Cancer, COPD, DVT, Graves disease, HLD, HTN, Hypothyroid, Kidney cancer, Obesity, Sleep apnea p/w 2 days of chest tightness, blurry vision, b/l hand pain/weakness/tremors and sob. Has had multiple evaluations and admission for similar presentation recently with neg w/u. Does not believe it is her asthma and has not tried to use her nebulizer and has not taken her prescribed ativan in several weeks because she cannot find the pills. Also endorses b/l leg swelling. (30 Sep 2019 19:39)  Patient now with episode of chest pain/tightness that she attributes to similar episodes of asthma in the past.       #Chest pain/tightness; ?2/2 asthma, r/o ACS  -Vital signs hemodynamically stable  -EKG: Sinus bradycardia, HR 50 BPM. No acute ST/T segment changes noted when compared with prior EKG in [place] from 9/25.   -STAT HsT ordered but patient became agitated and refused to have blood drawn, will try with AM labs  -Telemetry without events, currently sinus cassandra 50s. Continue to monitor on telemetry.  -CTA chest (9/30) without evidence of PE  -Continue with 2LNC  -Additional Duoneb treatment x1 as well as ativan 0.5mg PO ordered  -Cardiology evaluation is pending  -Consider pulmonology evaluation  -Will continue to closely monitor patient/vitals  -Primary Team to follow up in AM, attending to follow       Renu Esteves PA-C  Dept of Medicine  [id number]

## 2019-10-01 NOTE — BEHAVIORAL HEALTH ASSESSMENT NOTE - AXIS III
asthma, COPD (quit smoking in 1997), HTN, HLD, Grave's disease, hypothyroidism, breast CA, kidney CA, cholecystectemy, laminectomy (difficulty ambulating as result of complications from the procedure asthma, COPD (quit smoking in 1997), HTN, HLD, Grave's disease, hypothyroidism, breast CA, kidney CA, cholecystectemy, laminectomy (difficulty ambulating as result of complications from the procedure)

## 2019-10-01 NOTE — BEHAVIORAL HEALTH ASSESSMENT NOTE - NSBHCHARTREVIEWVS_PSY_A_CORE FT
ICU Vital Signs Last 24 Hrs  T(C): 36.6 (01 Oct 2019 04:11), Max: 36.9 (30 Sep 2019 17:53)  T(F): 97.8 (01 Oct 2019 04:11), Max: 98.5 (30 Sep 2019 23:20)  HR: 96 (01 Oct 2019 11:12) (47 - 96)  BP: 126/56 (01 Oct 2019 11:12) (126/56 - 178/87)  BP(mean): 104 (30 Sep 2019 19:39) (104 - 104)  ABP: --  ABP(mean): --  RR: 18 (01 Oct 2019 11:12) (18 - 20)  SpO2: 98% (01 Oct 2019 11:12) (96% - 100%) Vital Signs Last 24 Hrs  T(C): 37 (01 Oct 2019 11:12), Max: 37 (01 Oct 2019 11:12)  T(F): 98.6 (01 Oct 2019 11:12), Max: 98.6 (01 Oct 2019 11:12)  HR: 96 (01 Oct 2019 11:12) (47 - 96)  BP: 126/56 (01 Oct 2019 11:12) (126/56 - 178/87)  BP(mean): 104 (30 Sep 2019 19:39) (104 - 104)  RR: 18 (01 Oct 2019 11:12) (18 - 20)  SpO2: 98% (01 Oct 2019 11:12) (96% - 100%)
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
## 2019-10-01 NOTE — BEHAVIORAL HEALTH ASSESSMENT NOTE - OTHER PAST PSYCHIATRIC HISTORY (INCLUDE DETAILS REGARDING ONSET, COURSE OF ILLNESS, INPATIENT/OUTPATIENT TREATMENT)
Previous hospitalizations ([facility] in 11/2015) - Previous hospitalizations (most recent at [facility] in 11/2015 for anxiety/depression)  - Previous outpatient treatment with psychiatrist for med management, ended approx. 1 year ago when psychiatrist retired  - Previous outpatient group therapy in the 1980s for depression

## 2019-10-01 NOTE — BEHAVIORAL HEALTH ASSESSMENT NOTE - NSBHCHARTREVIEWLAB_PSY_A_CORE FT
12.5   16.0  )-----------( 180      ( 30 Sep 2019 13:40 )             41.1     09-30    139  |  101  |  17  ----------------------------<  93  4.4   |  26  |  0.58    Ca    9.3      30 Sep 2019 13:40  Mg     2.1     09-30    TPro  6.1  /  Alb  3.7  /  TBili  1.2  /  DBili  x   /  AST  18  /  ALT  19  /  AlkPhos  85  09-30

## 2019-10-01 NOTE — BEHAVIORAL HEALTH ASSESSMENT NOTE - NSBHSOCIALHXDETAILSFT_PSY_A_CORE
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 Pt went back to school and earned a degree in human services at 47yo. She has been  to her  for 51 years. In addition to the son who lives with her, she has another son and a daughter, and 2 grandchildren.

## 2019-10-01 NOTE — BEHAVIORAL HEALTH ASSESSMENT NOTE - NSBHCONSULTFOLLOWAFTERCARE_PSY_A_CORE FT
no psychiatric contraindication to discharge, however primary team will need to ensure a safe discharge given concerning statement patient made about . follow up for outpatient treatment at [facility]
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
## 2019-10-01 NOTE — CHART NOTE - NSCHARTNOTEFT_GEN_A_CORE
Notified by RN patient noted to have own home medication in purse. Medication labeled as oxycodone 5mg - 7 pills in bottle. Instructed RN to take medication away from patient and send down to pharmacy. VSS.     VS   T 98.6   HR 93   /61   RR 18   O2 Sat 98%     medication removed from bedside   VS q 2 hours x 8 hours   enhanced supervision   neuro checks q 4 hours   D/w Dr. Choudhary     will continue to monitor   will endorse to day team     Keerthi Durbin PA-C   [id number] Notified by RN patient noted to have own home medication in purse. Medication labeled as oxycodone 5mg - 7 pills in bottle. Does not endorse taking own meds. Instructed RN to take medication away from patient and send down to pharmacy. VSS.     VS   T 98.6   HR 93   /61   RR 18   O2 Sat 98%     medication removed from bedside   VS q 2 hours x 8 hours   enhanced supervision   neuro checks q 4 hours   D/w Dr. Choudhary     will continue to monitor   will endorse to day team     Keerthi Durbin PA-C   [id number]

## 2019-10-01 NOTE — PROGRESS NOTE ADULT - SUBJECTIVE AND OBJECTIVE BOX
INTERVAL HPI/OVERNIGHT EVENTS: I am sob etec etc   Vital Signs Last 24 Hrs  T(C): 37 (01 Oct 2019 11:12), Max: 37 (01 Oct 2019 11:12)  T(F): 98.6 (01 Oct 2019 11:12), Max: 98.6 (01 Oct 2019 11:12)  HR: 96 (01 Oct 2019 11:12) (47 - 96)  BP: 126/56 (01 Oct 2019 11:12) (126/56 - 178/87)  BP(mean): 104 (30 Sep 2019 19:39) (104 - 104)  RR: 18 (01 Oct 2019 11:12) (18 - 20)  SpO2: 98% (01 Oct 2019 11:12) (96% - 100%)  I&O's Summary    30 Sep 2019 07:  -  01 Oct 2019 07:00  --------------------------------------------------------  IN: 0 mL / OUT: 650 mL / NET: -650 mL    01 Oct 2019 07:01  -  01 Oct 2019 15:38  --------------------------------------------------------  IN: 500 mL / OUT: 1800 mL / NET: -1300 mL      MEDICATIONS  (STANDING):  ALBUTerol/ipratropium for Nebulization 3 milliLiter(s) Nebulizer every 6 hours  amLODIPine   Tablet 5 milliGRAM(s) Oral daily  aspirin  chewable 81 milliGRAM(s) Oral daily  atorvastatin 40 milliGRAM(s) Oral at bedtime  citalopram 20 milliGRAM(s) Oral daily  docusate sodium 100 milliGRAM(s) Oral three times a day  enoxaparin Injectable 40 milliGRAM(s) SubCutaneous daily  folic acid 1 milliGRAM(s) Oral daily  gabapentin 200 milliGRAM(s) Oral three times a day  influenza   Vaccine 0.5 milliLiter(s) IntraMuscular once  levothyroxine 100 MICROGram(s) Oral daily  losartan 100 milliGRAM(s) Oral daily  multivitamin 1 Tablet(s) Oral daily  predniSONE   Tablet 5 milliGRAM(s) Oral daily  torsemide 20 milliGRAM(s) Oral daily    MEDICATIONS  (PRN):  acetaminophen   Tablet .. 650 milliGRAM(s) Oral every 6 hours PRN Temp greater or equal to 38.5C (101.3F), Moderate Pain (4 - 6)  LORazepam     Tablet 1 milliGRAM(s) Oral four times a day PRN Anxiety  melatonin 3 milliGRAM(s) Oral at bedtime PRN Insomnia  oxyCODONE    IR 5 milliGRAM(s) Oral every 6 hours PRN Moderate Pain (4 - 6)    LABS:                        12.5   16.0  )-----------( 180      ( 30 Sep 2019 13:40 )             41.1         139  |  101  |  17  ----------------------------<  93  4.4   |  26  |  0.58    Ca    9.3      30 Sep 2019 13:40  Mg     2.1         TPro  6.1  /  Alb  3.7  /  TBili  1.2  /  DBili  x   /  AST  18  /  ALT  19  /  AlkPhos  85      PT/INR - ( 30 Sep 2019 13:40 )   PT: 11.3 sec;   INR: 0.98 ratio         PTT - ( 30 Sep 2019 13:40 )  PTT:22.7 sec  Urinalysis Basic - ( 01 Oct 2019 07:53 )    Color: Colorless / Appearance: Clear / S.007 / pH: x  Gluc: x / Ketone: Negative  / Bili: Negative / Urobili: Negative   Blood: x / Protein: Negative / Nitrite: Negative   Leuk Esterase: Negative / RBC: 2 /hpf / WBC 0 /HPF   Sq Epi: x / Non Sq Epi: 1 /hpf / Bacteria: Negative      CAPILLARY BLOOD GLUCOSE            Urinalysis Basic - ( 01 Oct 2019 07:53 )    Color: Colorless / Appearance: Clear / S.007 / pH: x  Gluc: x / Ketone: Negative  / Bili: Negative / Urobili: Negative   Blood: x / Protein: Negative / Nitrite: Negative   Leuk Esterase: Negative / RBC: 2 /hpf / WBC 0 /HPF   Sq Epi: x / Non Sq Epi: 1 /hpf / Bacteria: Negative      REVIEW OF SYSTEMS:  CONSTITUTIONAL: No fever, weight loss, or fatigue  EYES: No eye pain, visual disturbances, or discharge  ENMT:  No difficulty hearing, tinnitus, vertigo; No sinus or throat pain  NECK: No pain or stiffness  RESPIRATORY: No cough, wheezing, but  shortness of breath  CARDIOVASCULAR: No chest pain, palpitations, dizziness, or leg swelling  GASTROINTESTINAL: No abdominal or epigastric pain. No nausea, vomiting, or hematemesis; No diarrhea or constipation. No melena or hematochezia.  GENITOURINARY: No dysuria, frequency, hematuria, or incontinence  NEUROLOGICAL: No headaches, memory loss, loss of strength, numbness, or tremors  Consultant(s) Notes Reviewed:  [x ] YES  [ ] NO    PHYSICAL EXAM:  GENERAL: NAD, well-groomed, well-developed,not in any distress ,  HEAD:  Atraumatic, Normocephalic  EYES: EOMI, PERRLA, conjunctiva and sclera clear  NECK: Supple, No JVD, Normal thyroid  NERVOUS SYSTEM:  Alert & Oriented X3, No focal deficit   CHEST/LUNG: Good air entry bilateral with no  rales, rhonchi, wheezing, or rubs  HEART: Regular rate and rhythm; No murmurs, rubs, or gallops  ABDOMEN: Soft, Nontender, Nondistended; Bowel sounds present  EXTREMITIES:  2+ Peripheral Pulses, No clubbing, cyanosis, or edema    Care Discussed with Consultants/Other Providers [ x] YES  [ ] NO

## 2019-10-01 NOTE — BEHAVIORAL HEALTH ASSESSMENT NOTE - OTHER
that she is not getting her medications when she should have them preoccupied with worries re: shortness of breath, chest tightness, chronic pain, anxiety

## 2019-10-01 NOTE — BEHAVIORAL HEALTH ASSESSMENT NOTE - FAMILY DETAILS
48 year old developmentally disabled son primarily cared for by  lives with  and 48 year old developmentally disabled son, primarily cared for by

## 2019-10-01 NOTE — PROVIDER CONTACT NOTE (OTHER) - ACTION/TREATMENT ORDERED:
Medication removed from patient's room, and delivered in person to pharmacy. Nursing supervisor present. Q4 neuro checks in progress at this time. Enhanced supervision in progress. Medication removed from patient's room, and delivered in person to pharmacy. Nursing supervisor present. Q4 neuro checks in and enhanced supervision in progress at this time.

## 2019-10-01 NOTE — BEHAVIORAL HEALTH ASSESSMENT NOTE - NSBHCHARTREVIEWIMAGING_PSY_A_CORE FT
< from: Xray Chest 1 View- PORTABLE-Urgent (09.11.19 @ 12:06) >    INTERPRETATION:     The lungs are freeof focal abnormalities. Old fractures of the left   sixth and seventh ribs. The heart is not enlarged and there are no   effusions. Cervical spine orthopedic hardware.      COMPARISON:  September 10, 2019      IMPRESSION:  Clear lungs.    < end of copied text > < from: CT Angio Chest w/ IV Cont (09.30.19 @ 18:44) >    EXAM:  CT ANGIO CHEST (W)AW IC                          PROCEDURE DATE:  09/30/2019      INTERPRETATION:  CLINICAL INFORMATION: Shortness of breath, numbness.   COMPARISON: Pulmonary CT angiogram from 4/30/2019.    PROCEDURE:   CT Angiography of the Chest.  90 ml of Omnipaque 350 was injected intravenously. 10 ml were discarded.  Sagittal and coronal reformats were performed as well as 3D (MIP)   reconstructions.    FINDINGS:    LUNGS AND AIRWAYS: Patent central airways.  Emphysema. Bilateral lower   lobe linear atelectasis.  PLEURA: No pleural effusion.  MEDIASTINUM AND MIYA: No lymphadenopathy.  VESSELS: Good opacification the pulmonary arterial tree. No pulmonary   embolism. Aortic atherosclerotic calcifications.  HEART: Cardiomegaly. No pericardial effusion.  CHEST WALL AND LOWER NECK: Within normal limits.  VISUALIZED UPPER ABDOMEN: Left renal cyst.  BONES: Acute appearing right anterior third rib fracture. Additional   bilateral subacute to chronic rib fractures. Spinal degenerative changes.   Upper thoracic spinal fusion hardware.    IMPRESSION:     No pulmonary embolism.    Acute appearing right anterior third rib fracture. Multiple bilateral   subacute to chronic rib fractures.    Emphysema    DINORAH GUILLEN M.D., RADIOLOGY RESIDENT  This document has been electronically signed.  MARIUSZ MORAN M.D., ATTENDING RADIOLOGIST  This document has been electronically signed. Sep 30 2019  7:01PM

## 2019-10-01 NOTE — BEHAVIORAL HEALTH ASSESSMENT NOTE - NSBHCHARTREVIEWINVESTIGATE_PSY_A_CORE FT
< from: 12 Lead ECG (09.12.19 @ 00:34) >      Ventricular Rate 42 BPM    Atrial Rate 42 BPM    P-R Interval 160 ms    QRS Duration 114 ms    Q-T Interval 468 ms    QTC Calculation(Bezet) 390 ms    P Axis 51 degrees    R Axis 14 degrees    T Axis 0 degrees    Diagnosis Line Marked sinus bradycardia  Possible Anterior infarct , age undetermined  Abnormal ECG    < end of copied text > < from: 12 Lead ECG (09.30.19 @ 12:45) >    Ventricular Rate 53 BPM    Atrial Rate 53 BPM    P-R Interval 142 ms    QRS Duration 86 ms    Q-T Interval 414 ms    QTC Calculation(Bezet) 388 ms    P Axis 65 degrees    R Axis 2 degrees    T Axis 23 degrees    Diagnosis Line SINUS BRADYCARDIA  POSSIBLE ANTERIOR INFARCT , AGE UNDETERMINED  ABNORMAL ECG    Confirmed by ATTENDING, ED ([id number]),  ANDRE PINK ([id number]) on 10/1/2019 4:37:33 AM

## 2019-10-01 NOTE — CONSULT NOTE ADULT - SUBJECTIVE AND OBJECTIVE BOX
CHIEF COMPLAINT: chest tightness    HISTORY OF PRESENT ILLNESS:  67 y/o F patient with a significant PMHx of Anxiety, Asthma, Breast Cancer, COPD, DVT, Graves disease, HLD, HTN, Hypothyroid, Kidney cancer, Obesity, Sleep apnea p/w 2 days of chest tightness, blurry vision, b/l hand pain/weakness/tremors and sob. Has had multiple evaluations and admission for similar presentation recently with neg w/u. Does not believe it is her asthma and has not tried to use her nebulizer and has not taken her prescribed ativan in several weeks because she cannot find the pills. Also endorses b/l leg swelling.    Allergies  Grapes (Hives)  No Known Drug Allergies  Peaches (Hives)  shellfish (Hives)        MEDICATIONS:  amLODIPine   Tablet 2.5 milliGRAM(s) Oral daily  aspirin  chewable 81 milliGRAM(s) Oral daily  enoxaparin Injectable 40 milliGRAM(s) SubCutaneous daily  losartan 100 milliGRAM(s) Oral daily  torsemide 20 milliGRAM(s) Oral daily  ALBUTerol/ipratropium for Nebulization 3 milliLiter(s) Nebulizer every 6 hours  acetaminophen   Tablet .. 650 milliGRAM(s) Oral every 6 hours PRN  citalopram 20 milliGRAM(s) Oral daily  gabapentin 200 milliGRAM(s) Oral three times a day  LORazepam     Tablet 1 milliGRAM(s) Oral four times a day PRN  melatonin 3 milliGRAM(s) Oral at bedtime PRN  docusate sodium 100 milliGRAM(s) Oral three times a day  atorvastatin 40 milliGRAM(s) Oral at bedtime  levothyroxine 100 MICROGram(s) Oral daily  predniSONE   Tablet 5 milliGRAM(s) Oral daily    folic acid 1 milliGRAM(s) Oral daily  influenza   Vaccine 0.5 milliLiter(s) IntraMuscular once  multivitamin 1 Tablet(s) Oral daily      PAST MEDICAL & SURGICAL HISTORY:  Asthma  Sleep apnea  Obesity  DVT (deep venous thrombosis): history of- not presently on A/C  Hypothyroid  COPD (chronic obstructive pulmonary disease)  Breast cancer in situ, left  Kidney cancer, primary, with metastasis from kidney to other site, left: LEft sided- s/p nephrectomy only- no chemo or RT  Graves disease  Anxiety  Hyperlipidemia  Hypertension  S/P laminectomy: now bed and wheelchair ridden with severe pain  History of parathyroidectomy  History of carpal tunnel release: right wrist  History of eye surgery: 7 surgeries secondary to grave&#x27;s disease  History of pelvic surgery: Pelvic Sling  S/P breast biopsy: left  S/p nephrectomy: left  S/P cholecystectomy      FAMILY HISTORY:  Family history of thyroid cancer (Child): daughter has thyroid cancer  Family history of heart disease (Sibling): brother has a PPM  Family history of diabetes mellitus (Sibling): siblings  Family history of hypertension: mother and father  Family history of myocardial infarction: mother  at age 67 and father at age 67 of MI&#x27;s      SOCIAL HISTORY:    non smoker. wheelchair bound. dependent on iadl's      REVIEW OF SYSTEMS:  See HPI, otherwise complete 10 point review of systems negative    [ ] All others negative	      PHYSICAL EXAM:  T(C): 36.6 (10-01-19 @ 04:11), Max: 36.9 (19 @ 17:53)  HR: 47 (10-01-19 @ 04:11) (47 - 84)  BP: 176/76 (10-01-19 @ 04:11) (134/79 - 178/87)  RR: 18 (10-01-19 @ 04:11) (18 - 20)  SpO2: 100% (10-01-19 @ 04:11) (96% - 100%)  Wt(kg): --  I&O's Summary    30 Sep 2019 07:01  -  01 Oct 2019 07:00  --------------------------------------------------------  IN: 0 mL / OUT: 650 mL / NET: -650 mL        Appearance: No Acute Distress	  HEENT:  Normal oral mucosa, PERRL, EOMI	  Cardiovascular: Normal S1 S2, No JVD, No murmurs/rubs/gallops  Respiratory: Lungs clear to auscultation bilaterally  Gastrointestinal:  Soft, Non-tender, + BS	  Skin: No rashes, No ecchymoses, No cyanosis	  Neurologic: Non-focal  Extremities: No clubbing, cyanosis or edema  Vascular: Peripheral pulses palpable 2+ bilaterally  Psychiatry: A & O x 3, Mood & affect appropriate    Laboratory Data:	 	    CBC Full  -  ( 30 Sep 2019 13:40 )  WBC Count : 16.0 K/uL  Hemoglobin : 12.5 g/dL  Hematocrit : 41.1 %  Platelet Count - Automated : 180 K/uL  Mean Cell Volume : 88.0 fl  Mean Cell Hemoglobin : 26.8 pg  Mean Cell Hemoglobin Concentration : 30.4 gm/dL  Auto Neutrophil # : 14.4 K/uL  Auto Lymphocyte # : 0.8 K/uL  Auto Monocyte # : 0.7 K/uL  Auto Eosinophil # : 0.0 K/uL  Auto Basophil # : 0.0 K/uL  Auto Neutrophil % : 89.9 %  Auto Lymphocyte % : 5.2 %  Auto Monocyte % : 4.5 %  Auto Eosinophil % : 0.3 %  Auto Basophil % : 0.2 %        139  |  101  |  17  ----------------------------<  93  4.4   |  26  |  0.58    Ca    9.3      30 Sep 2019 13:40  Mg     2.1         TPro  6.1  /  Alb  3.7  /  TBili  1.2  /  DBili  x   /  AST  18  /  ALT  19  /  AlkPhos  85        proBNP: Serum Pro-Brain Natriuretic Peptide: 188 pg/mL ( @ 13:40)    Lipid Profile:   HgA1c:   TSH: Thyroid Stimulating Hormone, Serum: 0.64 uIU/mL ( @ 18:42)      	    ECG:  nsr prwp no acute ischemic changes	      Assessment:  -atypical cp with mildly elevated ce and no acute ischemic changes on ecg  -venous insufficiency  -acute rib fx of unclear etiology  -copd/asthma  -obesity  -melba  -severe anxiety  -hypothyroidism with suppressed TSH    Recs:  -obtain CTA coronaries  -c/w home antihypertensives and diuretics  -c/w bronchodilators  -consult pulm  -consult psych  -keep legs elevated, compression stockings and c/w diuretic  -needs workup and evaluation of rib fx   -DVT ppx      Greater than 60 minutes spent on total encounter; more than 50% of the visit was spent counseling and/or coordinating care by the attending physician.   	  Juan Salcedo MD   Cardiovascular Diseases  [phone]

## 2019-10-02 LAB
ANION GAP SERPL CALC-SCNC: 12 MMOL/L — SIGNIFICANT CHANGE UP (ref 5–17)
BUN SERPL-MCNC: 16 MG/DL — SIGNIFICANT CHANGE UP (ref 7–23)
CALCIUM SERPL-MCNC: 9.6 MG/DL — SIGNIFICANT CHANGE UP (ref 8.4–10.5)
CHLORIDE SERPL-SCNC: 96 MMOL/L — SIGNIFICANT CHANGE UP (ref 96–108)
CO2 SERPL-SCNC: 33 MMOL/L — HIGH (ref 22–31)
CREAT SERPL-MCNC: 0.85 MG/DL — SIGNIFICANT CHANGE UP (ref 0.5–1.3)
CULTURE RESULTS: SIGNIFICANT CHANGE UP
GLUCOSE SERPL-MCNC: 146 MG/DL — HIGH (ref 70–99)
HCT VFR BLD CALC: 42.6 % — SIGNIFICANT CHANGE UP (ref 34.5–45)
HGB BLD-MCNC: 12.7 G/DL — SIGNIFICANT CHANGE UP (ref 11.5–15.5)
MCHC RBC-ENTMCNC: 26.5 PG — LOW (ref 27–34)
MCHC RBC-ENTMCNC: 29.8 GM/DL — LOW (ref 32–36)
MCV RBC AUTO: 88.8 FL — SIGNIFICANT CHANGE UP (ref 80–100)
PLATELET # BLD AUTO: 213 K/UL — SIGNIFICANT CHANGE UP (ref 150–400)
POTASSIUM SERPL-MCNC: 3.6 MMOL/L — SIGNIFICANT CHANGE UP (ref 3.5–5.3)
POTASSIUM SERPL-SCNC: 3.6 MMOL/L — SIGNIFICANT CHANGE UP (ref 3.5–5.3)
RBC # BLD: 4.8 M/UL — SIGNIFICANT CHANGE UP (ref 3.8–5.2)
RBC # FLD: 15.9 % — HIGH (ref 10.3–14.5)
SODIUM SERPL-SCNC: 141 MMOL/L — SIGNIFICANT CHANGE UP (ref 135–145)
SPECIMEN SOURCE: SIGNIFICANT CHANGE UP
WBC # BLD: 11.43 K/UL — HIGH (ref 3.8–10.5)
WBC # FLD AUTO: 11.43 K/UL — HIGH (ref 3.8–10.5)

## 2019-10-02 RX ORDER — IPRATROPIUM/ALBUTEROL SULFATE 18-103MCG
3 AEROSOL WITH ADAPTER (GRAM) INHALATION EVERY 6 HOURS
Refills: 0 | Status: DISCONTINUED | OUTPATIENT
Start: 2019-10-02 | End: 2019-10-05

## 2019-10-02 RX ORDER — POTASSIUM CHLORIDE 20 MEQ
20 PACKET (EA) ORAL ONCE
Refills: 0 | Status: COMPLETED | OUTPATIENT
Start: 2019-10-02 | End: 2019-10-02

## 2019-10-02 RX ADMIN — ENOXAPARIN SODIUM 40 MILLIGRAM(S): 100 INJECTION SUBCUTANEOUS at 11:25

## 2019-10-02 RX ADMIN — OXYCODONE HYDROCHLORIDE 5 MILLIGRAM(S): 5 TABLET ORAL at 14:43

## 2019-10-02 RX ADMIN — Medication 81 MILLIGRAM(S): at 11:24

## 2019-10-02 RX ADMIN — Medication 3 MILLILITER(S): at 11:25

## 2019-10-02 RX ADMIN — Medication 0.5 MILLIGRAM(S): at 19:51

## 2019-10-02 RX ADMIN — AMLODIPINE BESYLATE 5 MILLIGRAM(S): 2.5 TABLET ORAL at 05:48

## 2019-10-02 RX ADMIN — Medication 100 MICROGRAM(S): at 04:39

## 2019-10-02 RX ADMIN — Medication 1 MILLIGRAM(S): at 11:18

## 2019-10-02 RX ADMIN — Medication 20 MILLIEQUIVALENT(S): at 18:22

## 2019-10-02 RX ADMIN — Medication 1 TABLET(S): at 11:23

## 2019-10-02 RX ADMIN — Medication 3 MILLILITER(S): at 21:09

## 2019-10-02 RX ADMIN — Medication 100 MILLIGRAM(S): at 04:35

## 2019-10-02 RX ADMIN — Medication 1 MILLIGRAM(S): at 17:03

## 2019-10-02 RX ADMIN — OXYCODONE HYDROCHLORIDE 5 MILLIGRAM(S): 5 TABLET ORAL at 07:27

## 2019-10-02 RX ADMIN — Medication 650 MILLIGRAM(S): at 17:30

## 2019-10-02 RX ADMIN — GABAPENTIN 200 MILLIGRAM(S): 400 CAPSULE ORAL at 21:08

## 2019-10-02 RX ADMIN — Medication 1 MILLIGRAM(S): at 11:23

## 2019-10-02 RX ADMIN — Medication 3 MILLILITER(S): at 04:38

## 2019-10-02 RX ADMIN — Medication 3 MILLILITER(S): at 15:18

## 2019-10-02 RX ADMIN — Medication 0.5 MILLIGRAM(S): at 09:43

## 2019-10-02 RX ADMIN — OXYCODONE HYDROCHLORIDE 5 MILLIGRAM(S): 5 TABLET ORAL at 15:40

## 2019-10-02 RX ADMIN — Medication 650 MILLIGRAM(S): at 16:37

## 2019-10-02 RX ADMIN — LOSARTAN POTASSIUM 100 MILLIGRAM(S): 100 TABLET, FILM COATED ORAL at 04:37

## 2019-10-02 RX ADMIN — CITALOPRAM 30 MILLIGRAM(S): 10 TABLET, FILM COATED ORAL at 11:24

## 2019-10-02 RX ADMIN — Medication 5 MILLIGRAM(S): at 04:35

## 2019-10-02 RX ADMIN — Medication 20 MILLIGRAM(S): at 04:40

## 2019-10-02 RX ADMIN — Medication 1 MILLIGRAM(S): at 05:48

## 2019-10-02 RX ADMIN — GABAPENTIN 200 MILLIGRAM(S): 400 CAPSULE ORAL at 04:36

## 2019-10-02 RX ADMIN — ATORVASTATIN CALCIUM 40 MILLIGRAM(S): 80 TABLET, FILM COATED ORAL at 21:08

## 2019-10-02 RX ADMIN — GABAPENTIN 200 MILLIGRAM(S): 400 CAPSULE ORAL at 13:05

## 2019-10-02 NOTE — PROVIDER CONTACT NOTE (OTHER) - ASSESSMENT
Pt. A&Ox3 disoriented to time. Pt. c/o sternal chest 9/10. /74. HR 65. RR18 Sp02: 100% on 2L NC.
VSS, please see flow sheet, A&OX3, at rest, offers no complaints at this time, asymptomatic, denies cp, n/v/d, resumed NSR on tele monitor, no change in mental status.
VSS, please see flowsheet, A&OX3, denies cp at this time. Pt noted with POM, denies recent intake or recent self medication, no change in mental status noted.

## 2019-10-02 NOTE — PROVIDER CONTACT NOTE (OTHER) - RECOMMENDATIONS
PA notified
Continue to monitor, neuro checks ongoing, no deficits noted at this time
Provider, nursing supervisor and nursing manager made aware.

## 2019-10-02 NOTE — PROVIDER CONTACT NOTE (OTHER) - BACKGROUND
Admit dx: Chest pain. A&OX3, presents with anxiety, frequent outbursts, describes cp as asthma attack.
Admit dx: chest pain, A&OX3, hx: anxiety
Pt. admitted for Chest pain. PMH: Anxiety

## 2019-10-02 NOTE — PROGRESS NOTE ADULT - SUBJECTIVE AND OBJECTIVE BOX
Cardiovascular Disease Progress Note    Overnight events: No acute events overnight.  still with c/o cp/sob. no palps/dzizziness/ stable le edema  Otherwise review of systems negative    Objective Findings:  T(C): 36.6 (10-02-19 @ 04:30), Max: 37 (10-01-19 @ 11:12)  HR: 85 (10-02-19 @ 05:49) (55 - 96)  BP: 125/72 (10-02-19 @ 05:49) (94/60 - 129/76)  RR: 16 (10-02-19 @ 04:30) (16 - 18)  SpO2: 100% (10-02-19 @ 04:30) (98% - 100%)  Wt(kg): --  Daily     Daily Weight in k.6 (01 Oct 2019 09:10)      Physical Exam:  Gen: NAD  HEENT: EOMI  CV: RRR, normal S1 + S2, no m/r/g  Lungs: CTAB  Abd: soft, non-tender  Ext: + edema    Telemetry: nsr, brief pAT    Laboratory Data:                        12.5   16.0  )-----------( 180      ( 30 Sep 2019 13:40 )             41.1     -    139  |  101  |  17  ----------------------------<  93  4.4   |  26  |  0.58    Ca    9.3      30 Sep 2019 13:40  Mg     2.1         TPro  6.1  /  Alb  3.7  /  TBili  1.2  /  DBili  x   /  AST  18  /  ALT  19  /  AlkPhos  85  30    PT/INR - ( 30 Sep 2019 13:40 )   PT: 11.3 sec;   INR: 0.98 ratio         PTT - ( 30 Sep 2019 13:40 )  PTT:22.7 sec  CARDIAC MARKERS ( 30 Sep 2019 18:45 )  x     / x     / 47 U/L / x     / 3.2 ng/mL          Inpatient Medications:  MEDICATIONS  (STANDING):  ALBUTerol/ipratropium for Nebulization 3 milliLiter(s) Nebulizer every 6 hours  amLODIPine   Tablet 5 milliGRAM(s) Oral daily  aspirin  chewable 81 milliGRAM(s) Oral daily  atorvastatin 40 milliGRAM(s) Oral at bedtime  citalopram 30 milliGRAM(s) Oral daily  docusate sodium 100 milliGRAM(s) Oral three times a day  enoxaparin Injectable 40 milliGRAM(s) SubCutaneous daily  folic acid 1 milliGRAM(s) Oral daily  gabapentin 200 milliGRAM(s) Oral three times a day  influenza   Vaccine 0.5 milliLiter(s) IntraMuscular once  levothyroxine 100 MICROGram(s) Oral daily  LORazepam     Tablet 1 milliGRAM(s) Oral two times a day  losartan 100 milliGRAM(s) Oral daily  multivitamin 1 Tablet(s) Oral daily  predniSONE   Tablet 5 milliGRAM(s) Oral daily  torsemide 20 milliGRAM(s) Oral daily      Assessment:  -atypical cp with mildly elevated ce and no acute ischemic changes on ecg  -venous insufficiency  -acute rib fx of unclear etiology  -copd/asthma  -obesity  -melba  -severe anxiety  -hypothyroidism with suppressed TSH  -pAT    Recs:  -obtain CTA coronaries if patient agreeable. patient currently requesting angiogram; however, i explained to patient that it will involve similar amounts of radiation and clinically there is not enough objective data to support the risks of performing an angiogram in her current situation with atypical sx, indeterminate troponins and no acute ischemic changes on ecg with recent TTE showing normal LV function  -c/w home antihypertensives and diuretics  -c/w beta blockers for AT  -c/w bronchodilators  -consult pulm  -f/u psych  -keep legs elevated, compression stockings and c/w diuretic  -needs workup and evaluation of rib fx   -DVT ppx        Over 25 minutes spent on total encounter; more than 50% of the visit was spent counseling and/or coordinating care by the attending physician.      Juan Salcedo MD   Cardiovascular Disease  [phone]

## 2019-10-02 NOTE — PROVIDER CONTACT NOTE (OTHER) - SITUATION
Advised by tele of event occurring 0046h.  Pt presents with PAT, rate 139, duration 5.6 seconds
Pt noted concealing POM in pocket book. Identified as 1 bottle containing 7 pills labeled as Oxycodone HCL 5 mg.
Pt c/o Chest pain 8/10

## 2019-10-02 NOTE — PROGRESS NOTE ADULT - ASSESSMENT
_________________________________________________________________________________________  ========>>  M E D I C A L   A T T E N D I N G    F O L L O W  U P  N O T E  <<=========  -----------------------------------------------------------------------------------------------------    - Patient seen and examined by me approximately thirty minutes ago.  - In summary,  SHEKHAR VASQUEZ is a 68y year old woman who originally presented with chest pain   - Patient today overall doing ok, comfortable, eating OK.  	      (pt seen to be very anxious at time of exam, complaining of fingers being tight ad malformed.. in the course of discussion and with distraction, pt calmed down and had normal function and movement of her hands and fingers..)     ==================>> REVIEW OF SYSTEM <<=================    GEN: no fever, no chills,  c/o CP   RESP: + occasional SOB, no cough, no sputum  CVS: + chest pain ( when brought up) ,  no palpitations, no edema  GI: no abdominal pain, no nausea, no constipation, no diarrhea  : no dysuria, no frequency, no hematuria  Neuro: no headache, no dizziness  Derm : no itching, no rash    ==================>> PHYSICAL EXAM <<=================    GEN: A&O X 3 , NAD , comfortable but anxious in recliner   HEENT: NCAT, PERRL, MMM, hearing intact  Neck: supple , no JVD  CVS: S1S2 , regular , No M/R/G appreciated  PULM: CTA B/L,  decreased BS   ABD.: soft. non tender, non distended,  bowel sounds present  Extrem: intact pulses , no edema   PSYCH : + anxious      ==================>> MEDICATIONS <<====================    MEDICATIONS  (STANDING):  ALBUTerol/ipratropium for Nebulization 3 milliLiter(s) Nebulizer every 6 hours  amLODIPine   Tablet 5 milliGRAM(s) Oral daily  aspirin  chewable 81 milliGRAM(s) Oral daily  atorvastatin 40 milliGRAM(s) Oral at bedtime  citalopram 30 milliGRAM(s) Oral daily  docusate sodium 100 milliGRAM(s) Oral three times a day  enoxaparin Injectable 40 milliGRAM(s) SubCutaneous daily  folic acid 1 milliGRAM(s) Oral daily  gabapentin 200 milliGRAM(s) Oral three times a day  influenza   Vaccine 0.5 milliLiter(s) IntraMuscular once  levothyroxine 100 MICROGram(s) Oral daily  LORazepam     Tablet 1 milliGRAM(s) Oral once  LORazepam     Tablet 1 milliGRAM(s) Oral two times a day  losartan 100 milliGRAM(s) Oral daily  multivitamin 1 Tablet(s) Oral daily  predniSONE   Tablet 5 milliGRAM(s) Oral daily  torsemide 20 milliGRAM(s) Oral daily    MEDICATIONS  (PRN):  acetaminophen   Tablet .. 650 milliGRAM(s) Oral every 6 hours PRN Temp greater or equal to 38.5C (101.3F), Moderate Pain (4 - 6)  LORazepam   Injectable 0.5 milliGRAM(s) IV Push every 8 hours PRN severe anxiety  melatonin 3 milliGRAM(s) Oral at bedtime PRN Insomnia  oxyCODONE    IR 5 milliGRAM(s) Oral every 6 hours PRN Moderate Pain (4 - 6)      ==================>> VITAL SIGNS <<==================    T(C): 36.6 (10-02-19 @ 04:30), Max: 37 (10-01-19 @ 11:12)  HR: 85 (10-02-19 @ 05:49) (55 - 96)  BP: 125/72 (10-02-19 @ 05:49) (94/60 - 129/76)  RR: 16 (10-02-19 @ 04:30) (16 - 18)  SpO2: 100% (10-02-19 @ 04:30) (98% - 100%)     I&O's Summary    01 Oct 2019 07:  -  02 Oct 2019 07:00  --------------------------------------------------------  IN: 500 mL / OUT: 3100 mL / NET: -2600 mL    02 Oct 2019 07:  -  02 Oct 2019 11:11  --------------------------------------------------------  IN: 240 mL / OUT: 700 mL / NET: -460 mL       ==================>> LAB AND IMAGING <<==================                        12.5   16.0  )-----------( 180      ( 30 Sep 2019 13:40 )             41.1        141  |  96  |  16  ----------------------------<  146<H>  3.6   |  33<H>  |  0.85    Ca    9.6      02 Oct 2019 10:03  Mg     2.1         TPro  6.1  /  Alb  3.7  /  TBili  1.2  /  DBili  x   /  AST  18  /  ALT  19  /  AlkPhos  85  09-30    PT/INR - ( 30 Sep 2019 13:40 )   PT: 11.3 sec;   INR: 0.98 ratio    PTT - ( 30 Sep 2019 13:40 )  PTT:22.7 sec              ~~ High Sensitivity Troponin  ~~  46  <<--, 57  <<--     Urinalysis Basic - ( 01 Oct 2019 07:53 )    Color: Colorless / Appearance: Clear / S.007 / pH: x  Gluc: x / Ketone: Negative  / Bili: Negative / Urobili: Negative   Blood: x / Protein: Negative / Nitrite: Negative   Leuk Esterase: Negative / RBC: 2 /hpf / WBC 0 /HPF   Sq Epi: x / Non Sq Epi: 1 /hpf / Bacteria: Negative    TSH:      0.64   (19)       ,     < 0.10   (19)           ___________________________________________________________________________________  ===============>>  A S S E S S M E N T   A N D   P L A N <<===============  ------------------------------------------------------------------------------------------    · Assessment		  69 y/o F patient with a significant PMHx of Anxiety, Asthma, Breast Cancer, COPD, DVT, Graves disease, HLD, HTN, Hypothyroid, Kidney cancer, Obesity, Sleep apnea p/w 2 days of chest tightness, blurry vision, b/l hand pain/weakness/tremors and sob. Has had multiple evaluations and admission for similar presentation recently with neg w/u. Does not believe it is her asthma and has not tried to use her nebulizer and has not taken her prescribed ativan in several weeks because she cannot find the pills. Also endorses b/l leg swelling.      Problem/Plan - 1:  ·  Problem: Chest pain.    Cardiology appreciated   ischemic work up in proces     at this time pt declining Cardiac CT   will discuss further with pt and cardio      Problem/Plan - 2:  ·  Problem: Acute on chronic respiratory failure with hypoxia   Oxygen NC as needed  Nebs PRN     Problem/Plan - 3:  ·  Problem: COPD   Neb Rx .      Problem/Plan - 4:  ·  Problem: Hypertension.    BP meds with hold parameters.      Problem/Plan - 5:  ·  Problem: Hypothyroid.    Synthroid and will check TFT.      Problem/Plan - 6:  Problem: Rib fractures     new and old rib fractures in pt who has been on chronic steroids likely osteoporosis due to steroids    will check vitamin D level    needs Dexa scan as OP    Oxy for pain as needed      Problem/Plan - 7:  Problem: Anxiety & Depression   benzo  Psychiatry f/u    -GI/DVT Prophylaxis.    --------------------------------------------  Case discussed with pt, NP, cardio..   Education given on findings and plan of care  ___________________________  H. JANICE Welch.  Pager: [phone]

## 2019-10-03 ENCOUNTER — TRANSCRIPTION ENCOUNTER (OUTPATIENT)
Age: 69
End: 2019-10-03

## 2019-10-03 DIAGNOSIS — J44.9 CHRONIC OBSTRUCTIVE PULMONARY DISEASE, UNSPECIFIED: ICD-10-CM

## 2019-10-03 LAB
24R-OH-CALCIDIOL SERPL-MCNC: 16.1 NG/ML — LOW (ref 30–80)
ANION GAP SERPL CALC-SCNC: 12 MMOL/L — SIGNIFICANT CHANGE UP (ref 5–17)
BUN SERPL-MCNC: 20 MG/DL — SIGNIFICANT CHANGE UP (ref 7–23)
CALCIUM SERPL-MCNC: 9.8 MG/DL — SIGNIFICANT CHANGE UP (ref 8.4–10.5)
CHLORIDE SERPL-SCNC: 97 MMOL/L — SIGNIFICANT CHANGE UP (ref 96–108)
CO2 SERPL-SCNC: 34 MMOL/L — HIGH (ref 22–31)
CREAT SERPL-MCNC: 1.04 MG/DL — SIGNIFICANT CHANGE UP (ref 0.5–1.3)
GLUCOSE SERPL-MCNC: 130 MG/DL — HIGH (ref 70–99)
HCT VFR BLD CALC: 42 % — SIGNIFICANT CHANGE UP (ref 34.5–45)
HGB BLD-MCNC: 12.8 G/DL — SIGNIFICANT CHANGE UP (ref 11.5–15.5)
MCHC RBC-ENTMCNC: 27.4 PG — SIGNIFICANT CHANGE UP (ref 27–34)
MCHC RBC-ENTMCNC: 30.5 GM/DL — LOW (ref 32–36)
MCV RBC AUTO: 89.7 FL — SIGNIFICANT CHANGE UP (ref 80–100)
PLATELET # BLD AUTO: 221 K/UL — SIGNIFICANT CHANGE UP (ref 150–400)
POTASSIUM SERPL-MCNC: 4 MMOL/L — SIGNIFICANT CHANGE UP (ref 3.5–5.3)
POTASSIUM SERPL-SCNC: 4 MMOL/L — SIGNIFICANT CHANGE UP (ref 3.5–5.3)
RBC # BLD: 4.68 M/UL — SIGNIFICANT CHANGE UP (ref 3.8–5.2)
RBC # FLD: 15.9 % — HIGH (ref 10.3–14.5)
SODIUM SERPL-SCNC: 143 MMOL/L — SIGNIFICANT CHANGE UP (ref 135–145)
T4 FREE SERPL-MCNC: 1.6 NG/DL — SIGNIFICANT CHANGE UP (ref 0.9–1.8)
TROPONIN T, HIGH SENSITIVITY RESULT: 117 NG/L — HIGH (ref 0–51)
WBC # BLD: 17.44 K/UL — HIGH (ref 3.8–10.5)
WBC # FLD AUTO: 17.44 K/UL — HIGH (ref 3.8–10.5)

## 2019-10-03 PROCEDURE — 99232 SBSQ HOSP IP/OBS MODERATE 35: CPT

## 2019-10-03 RX ADMIN — GABAPENTIN 200 MILLIGRAM(S): 400 CAPSULE ORAL at 13:00

## 2019-10-03 RX ADMIN — Medication 81 MILLIGRAM(S): at 12:45

## 2019-10-03 RX ADMIN — Medication 0.5 MILLIGRAM(S): at 09:53

## 2019-10-03 RX ADMIN — GABAPENTIN 200 MILLIGRAM(S): 400 CAPSULE ORAL at 06:31

## 2019-10-03 RX ADMIN — CITALOPRAM 30 MILLIGRAM(S): 10 TABLET, FILM COATED ORAL at 12:45

## 2019-10-03 RX ADMIN — Medication 5 MILLIGRAM(S): at 06:31

## 2019-10-03 RX ADMIN — Medication 1 MILLIGRAM(S): at 17:12

## 2019-10-03 RX ADMIN — Medication 3 MILLILITER(S): at 20:11

## 2019-10-03 RX ADMIN — Medication 3 MILLILITER(S): at 06:30

## 2019-10-03 RX ADMIN — Medication 1 MILLIGRAM(S): at 06:34

## 2019-10-03 RX ADMIN — Medication 0.5 MILLIGRAM(S): at 18:20

## 2019-10-03 RX ADMIN — Medication 1 MILLIGRAM(S): at 22:17

## 2019-10-03 RX ADMIN — OXYCODONE HYDROCHLORIDE 5 MILLIGRAM(S): 5 TABLET ORAL at 19:29

## 2019-10-03 RX ADMIN — LOSARTAN POTASSIUM 100 MILLIGRAM(S): 100 TABLET, FILM COATED ORAL at 06:31

## 2019-10-03 RX ADMIN — OXYCODONE HYDROCHLORIDE 5 MILLIGRAM(S): 5 TABLET ORAL at 20:00

## 2019-10-03 RX ADMIN — GABAPENTIN 200 MILLIGRAM(S): 400 CAPSULE ORAL at 22:14

## 2019-10-03 RX ADMIN — AMLODIPINE BESYLATE 5 MILLIGRAM(S): 2.5 TABLET ORAL at 06:31

## 2019-10-03 RX ADMIN — Medication 3 MILLILITER(S): at 13:37

## 2019-10-03 RX ADMIN — ATORVASTATIN CALCIUM 40 MILLIGRAM(S): 80 TABLET, FILM COATED ORAL at 22:14

## 2019-10-03 RX ADMIN — Medication 1 TABLET(S): at 13:01

## 2019-10-03 RX ADMIN — Medication 1 MILLIGRAM(S): at 12:45

## 2019-10-03 RX ADMIN — ENOXAPARIN SODIUM 40 MILLIGRAM(S): 100 INJECTION SUBCUTANEOUS at 12:44

## 2019-10-03 RX ADMIN — Medication 100 MILLIGRAM(S): at 22:14

## 2019-10-03 RX ADMIN — Medication 20 MILLIGRAM(S): at 06:30

## 2019-10-03 RX ADMIN — Medication 100 MICROGRAM(S): at 06:31

## 2019-10-03 RX ADMIN — Medication 3 MILLILITER(S): at 15:03

## 2019-10-03 RX ADMIN — Medication 0.5 MILLIGRAM(S): at 16:37

## 2019-10-03 NOTE — PROGRESS NOTE ADULT - ASSESSMENT
M E D I C A L   A T T E N D I N G    F O L L O W    U P   N O T E                                     ------------------------------------------------------------------------------------------------    patient evaluated by me, case discussed with team, chart, medications, and physical exam reviewed, labs / tests  and vitals reviewed by me, as remedios.   Patient is stable for discharge today. discussed with cardio, NP, .  Patient to follow up with  PMD, cardio, Pulm , psych clinic   See discharge document for full note.      ==================>> MEDICATIONS <<====================    amLODIPine   Tablet 5 milliGRAM(s) Oral daily  aspirin  chewable 81 milliGRAM(s) Oral daily  atorvastatin 40 milliGRAM(s) Oral at bedtime  citalopram 30 milliGRAM(s) Oral daily  docusate sodium 100 milliGRAM(s) Oral three times a day  enoxaparin Injectable 40 milliGRAM(s) SubCutaneous daily  folic acid 1 milliGRAM(s) Oral daily  gabapentin 200 milliGRAM(s) Oral three times a day  influenza   Vaccine 0.5 milliLiter(s) IntraMuscular once  levothyroxine 100 MICROGram(s) Oral daily  LORazepam     Tablet 1 milliGRAM(s) Oral two times a day  losartan 100 milliGRAM(s) Oral daily  multivitamin 1 Tablet(s) Oral daily  predniSONE   Tablet 5 milliGRAM(s) Oral daily  torsemide 20 milliGRAM(s) Oral daily    MEDICATIONS  (PRN):  acetaminophen   Tablet .. 650 milliGRAM(s) Oral every 6 hours PRN Temp greater or equal to 38.5C (101.3F), Moderate Pain (4 - 6)  ALBUTerol/ipratropium for Nebulization 3 milliLiter(s) Nebulizer every 6 hours PRN Shortness of Breath and/or Wheezing  LORazepam   Injectable 0.5 milliGRAM(s) IV Push every 8 hours PRN severe anxiety  melatonin 3 milliGRAM(s) Oral at bedtime PRN Insomnia  oxyCODONE    IR 5 milliGRAM(s) Oral every 6 hours PRN Moderate Pain (4 - 6)    ==================>> VITAL SIGNS <<==================    T(C): 36.9 (10-03-19 @ 11:34), Max: 36.9 (10-02-19 @ 21:02)  HR: 102 (10-03-19 @ 11:34) (59 - 102)  BP: 119/72 (10-03-19 @ 11:34) (119/72 - 147/68)  RR: 18 (10-03-19 @ 11:34) (18 - 18)  SpO2: 100% (10-03-19 @ 11:34) (100% - 100%)     I&O's Summary    02 Oct 2019 07:01  -  03 Oct 2019 07:00  --------------------------------------------------------  IN: 480 mL / OUT: 1950 mL / NET: -1470 mL    03 Oct 2019 07:01  -  03 Oct 2019 14:27  --------------------------------------------------------  IN: 840 mL / OUT: 1300 mL / NET: -460 mL       ==================>> LAB AND IMAGING <<==================                        12.8   17.44 )-----------( 221      ( 03 Oct 2019 14:09 )             42.0        10-03    143  |  97  |  20  ----------------------------<  130<H>  4.0   |  34<H>  |  1.04    Ca    9.8      03 Oct 2019 11:07     TSH:      0.64   (09-30-19)       ,     < 0.10   (09-11-19)           WBC count:   17.44 <<== ,  11.43 <<== ,  16.0 <<==   Hemoglobin:   12.8 <<==,  12.7 <<==,  12.5 <<==  platelets:  221 <==, 213 <==, 180 <==    Creatinine:  1.04  <<==, 0.85  <<==, 0.58  <<==  Sodium:   143  <==, 141  <==, 139  <== _________________________________________________________________________________________  ========>>  M E D I C A L   A T T E N D I N G    F O L L O W  U P  N O T E  <<=========  -----------------------------------------------------------------------------------------------------    - Patient seen and examined by me earlier today.   - In summary,  SHEKHAR VASQUEZ is a 68y year old woman who originally presented with chest pain   - Patient today overall doing ok, comfortable, eating OK.        pt still with occasional chest pain and SOB, states nebulizers not working asking for more..     ==================>> REVIEW OF SYSTEM <<=================    GEN: no fever  RESP: + occasional SOB, no cough, no sputum  CVS: + chest pain,  no palpitations, no edema ( pt able to be distracted, clenching of chest goes away when pt calms down)   GI: no abdominal pain, no nausea, no constipation, no diarrhea  : no dysuria, no frequency, no hematuria  Neuro: no headache, no dizziness  Derm : no itching, no rash    ==================>> PHYSICAL EXAM <<=================    GEN: A&O X 3 , NAD , comfortable but anxious in recliner   HEENT: NCAT, PERRL, MMM, hearing intact  Neck: supple , no JVD  CVS: S1S2 , regular , No M/R/G appreciated  PULM: CTA B/L,  decreased BS   ABD.: soft. non tender, non distended,  bowel sounds present  Extrem: intact pulses , no edema   PSYCH : + anxious       ==================>> MEDICATIONS <<====================    amLODIPine   Tablet 5 milliGRAM(s) Oral daily  aspirin  chewable 81 milliGRAM(s) Oral daily  atorvastatin 40 milliGRAM(s) Oral at bedtime  citalopram 30 milliGRAM(s) Oral daily  docusate sodium 100 milliGRAM(s) Oral three times a day  enoxaparin Injectable 40 milliGRAM(s) SubCutaneous daily  folic acid 1 milliGRAM(s) Oral daily  gabapentin 200 milliGRAM(s) Oral three times a day  influenza   Vaccine 0.5 milliLiter(s) IntraMuscular once  levothyroxine 100 MICROGram(s) Oral daily  LORazepam     Tablet 1 milliGRAM(s) Oral two times a day  losartan 100 milliGRAM(s) Oral daily  multivitamin 1 Tablet(s) Oral daily  predniSONE   Tablet 5 milliGRAM(s) Oral daily  torsemide 20 milliGRAM(s) Oral daily    MEDICATIONS  (PRN):  acetaminophen   Tablet .. 650 milliGRAM(s) Oral every 6 hours PRN Temp greater or equal to 38.5C (101.3F), Moderate Pain (4 - 6)  ALBUTerol/ipratropium for Nebulization 3 milliLiter(s) Nebulizer every 6 hours PRN Shortness of Breath and/or Wheezing  LORazepam   Injectable 0.5 milliGRAM(s) IV Push every 8 hours PRN severe anxiety  melatonin 3 milliGRAM(s) Oral at bedtime PRN Insomnia  oxyCODONE    IR 5 milliGRAM(s) Oral every 6 hours PRN Moderate Pain (4 - 6)    ==================>> VITAL SIGNS <<==================    Vital Signs Last 24 Hrs  T(C): 36.9 (10-03-19 @ 11:34)  T(F): 98.4 (10-03-19 @ 11:34), Max: 98.4 (10-02-19 @ 21:02)  HR: 102 (10-03-19 @ 11:34) (59 - 102)  BP: 119/72 (10-03-19 @ 11:34)  RR: 18 (10-03-19 @ 11:34) (18 - 18)  SpO2: 100% (10-03-19 @ 11:34) (100% - 100%)       ==================>> LAB AND IMAGING <<==================                        12.8   17.44 )-----------( 221      ( 03 Oct 2019 14:09 )             42.0          143  |  97  |  20  ----------------------------<  130<H>  4.0   |  34<H>  |  1.04    Ca    9.8      03 Oct 2019 11:07    WBC count:   17.44 <<== ,  11.43 <<== ,  16.0 <<==   Hemoglobin:   12.8 <<==,  12.7 <<==,  12.5 <<==  platelets:  221 <==, 213 <==, 180 <==    Creatinine:  1.04  <<==, 0.85  <<==, 0.58  <<==  Sodium:   143  <==, 141  <==, 139  <==    ~~ High Sensitivity Troponin  ~~  117  <<--, 46  <<--  ___________________________________________________________________________________  ===============>>  A S S E S S M E N T   A N D   P L A N <<===============  ------------------------------------------------------------------------------------------    · Assessment		  67 y/o F patient with a significant PMHx of Anxiety, Asthma, Breast Cancer, COPD, DVT, Graves disease, HLD, HTN, Hypothyroid, Kidney cancer, Obesity, Sleep apnea p/w 2 days of chest tightness, blurry vision, b/l hand pain/weakness/tremors and sob. Has had multiple evaluations and admission for similar presentation recently with moose w/u. Does not believe it is her asthma and has not tried to use her nebulizer and has not taken her prescribed ativan in several weeks because she cannot find the pills. Also endorses b/l leg swelling.      Problem/Plan - 1:  ·  Problem: Chest pain, likely multifactorial given anxiety, rib fracture      need to rule out CAD as cause given now with elevated trop       pt declined CT coronaries and stress test         appreciated Cardiology note and discussed:: will try to arrange for cath tomorrow, if pt willing to stay ( asking today to go home !!)       Problem/Plan - 2:  ·  Problem: Acute on chronic respiratory failure with hypoxia  Oxygen NC as needed  Nebs PRN  pt uses O2 at home      Problem/Plan - 3:  ·  Problem: COPD   Neb Rx .      Problem/Plan - 4:  ·  Problem: Hypertension.    BP meds with hold parameters.      Problem/Plan - 5:  ·  Problem: Hypothyroid.    Synthroid   monitor TFT as OP     ( TSH was low before as pt was not taking meds properly)      Problem/Plan - 6:  Problem: Rib fractures     new and old rib fractures in pt who has been on chronic steroids likely osteoporosis due to steroids    will check vitamin D level      needs Dexa scan as OP    Oxy for pain as needed      Problem/Plan - 7:  Problem: Anxiety & Depression   benzo  Psychiatry f/u  also needs follow up with [facility] as OP     -GI/DVT Prophylaxis.    --------------------------------------------  Case discussed with pt, , SW, NP, cardio..   Education given on findings and plan of care  ___________________________  H. JANICE Welch.  Pager: [phone]
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
## 2019-10-03 NOTE — DISCHARGE NOTE PROVIDER - NSDCCPCAREPLAN_GEN_ALL_CORE_FT
PRINCIPAL DISCHARGE DIAGNOSIS  Diagnosis: Chest pain  Assessment and Plan of Treatment: PRINCIPAL DISCHARGE DIAGNOSIS  Diagnosis: Chest pain  Assessment and Plan of Treatment: Follow up with PCP outpatient  Continue current medications as prescribed.        SECONDARY DISCHARGE DIAGNOSES  Diagnosis: Anxiety  Assessment and Plan of Treatment: Follow up with [facility] for medication adjustments and anxiety    Diagnosis: COPD with asthma  Assessment and Plan of Treatment: Continue medications as prescribed  Follow up with your Pulmonologist  Monitor for signs of asthma exacerbation such as shortness of breath or any difficulties breathing.    Diagnosis: Rib fractures  Assessment and Plan of Treatment: Follow up with PCP for osteoporosis work up including a DEXA Scan PRINCIPAL DISCHARGE DIAGNOSIS  Diagnosis: Chest pain  Assessment and Plan of Treatment: Follow up with PCP outpatient  Continue current medications as prescribed.  S/p Coronary angiogram with no intervention required         SECONDARY DISCHARGE DIAGNOSES  Diagnosis: COPD with asthma  Assessment and Plan of Treatment: Continue medications as prescribed  Follow up with your Pulmonologist  Monitor for signs of asthma exacerbation such as shortness of breath or any difficulties breathing.    Diagnosis: Rib fractures  Assessment and Plan of Treatment: Follow up with PCP for osteoporosis work up including a DEXA Scan      Diagnosis: Anxiety  Assessment and Plan of Treatment: Follow up with [facility] for medication adjustments and anxiety

## 2019-10-03 NOTE — DISCHARGE NOTE PROVIDER - NSDCFUADDAPPT_GEN_ALL_CORE_FT
Follow up with [facility]patient  Follow up with your PCP Dr. Salcedo

## 2019-10-03 NOTE — PROGRESS NOTE BEHAVIORAL HEALTH - NSBHFUPINTERVALHXFT_PSY_A_CORE
Pt states she still not feeling better, has trouble breathing. Pt feels she is not being taken care of properly. Pt c/o anxiety, sob. Pt denies depression, no si/hi. pt reports fair sleep. no prns given.

## 2019-10-03 NOTE — DISCHARGE NOTE PROVIDER - PROVIDER TOKENS
PROVIDER:[TOKEN:[[id number]]] PROVIDER:[TOKEN:[[id number]],FOLLOWUP:[1 week]],FREE:[LAST:[[facility]],FIRST:[STARRH],PHONE:[phone]],FAX:[(   )    -],ADDRESS:[[address] [place] [address]],FOLLOWUP:[1 week]]

## 2019-10-03 NOTE — DISCHARGE NOTE PROVIDER - CARE PROVIDER_API CALL
Kamila Welch (DO)  Internal Medicine  [address] [place] [address]  Phone: [phone]  Fax: [phone]  Follow Up Time: Juan Salcedo)  Internal Medicine  [address] [place] [address]  Phone: [phone]  Fax: [phone]  Follow Up Time: 1 week    [facility], [facility]  [address] [place] [address]  Phone: [phone]  Fax: (   )    -  Follow Up Time: 1 week

## 2019-10-03 NOTE — DISCHARGE NOTE PROVIDER - NSDCCAREPROVSEEN_GEN_ALL_CORE_FT
Kamila Welch  [facility] Medicine, Advance PracticeTeam Sheri Lieberman Previdi  Advance PracticeTeam [facility] Medicine  Aditya Sarkis Hernandez  Ordering Physician
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
## 2019-10-03 NOTE — PROGRESS NOTE ADULT - SUBJECTIVE AND OBJECTIVE BOX
Cardiovascular Disease Progress Note    Overnight events: No acute events overnight.  states will only have angiogram but refuses any other type of cardiac workup. no new cardiac sx. requesting to go home but then says doesnt feel safe at home  Otherwise review of systems negative    Objective Findings:  T(C): 36.6 (10-03-19 @ 04:39), Max: 36.9 (10-02-19 @ 21:02)  HR: 59 (10-03-19 @ 04:39) (59 - 98)  BP: 129/76 (10-03-19 @ 04:39) (129/76 - 153/79)  RR: 18 (10-03-19 @ 04:39) (18 - 18)  SpO2: 100% (10-03-19 @ 04:39) (100% - 100%)  Wt(kg): --  Daily     Daily       Physical Exam:  Gen: NAD  HEENT: EOMI  CV: RRR, normal S1 + S2, no m/r/g  Lungs: CTAB  Abd: soft, non-tender  Ext: mild edema and ecchymoses to legs    Telemetry: nsr    Laboratory Data:                        12.7   11.43 )-----------( 213      ( 02 Oct 2019 12:13 )             42.6     10-02    141  |  96  |  16  ----------------------------<  146<H>  3.6   |  33<H>  |  0.85    Ca    9.6      02 Oct 2019 10:03                Inpatient Medications:  MEDICATIONS  (STANDING):  amLODIPine   Tablet 5 milliGRAM(s) Oral daily  aspirin  chewable 81 milliGRAM(s) Oral daily  atorvastatin 40 milliGRAM(s) Oral at bedtime  citalopram 30 milliGRAM(s) Oral daily  docusate sodium 100 milliGRAM(s) Oral three times a day  enoxaparin Injectable 40 milliGRAM(s) SubCutaneous daily  folic acid 1 milliGRAM(s) Oral daily  gabapentin 200 milliGRAM(s) Oral three times a day  influenza   Vaccine 0.5 milliLiter(s) IntraMuscular once  levothyroxine 100 MICROGram(s) Oral daily  LORazepam     Tablet 1 milliGRAM(s) Oral two times a day  losartan 100 milliGRAM(s) Oral daily  multivitamin 1 Tablet(s) Oral daily  predniSONE   Tablet 5 milliGRAM(s) Oral daily  torsemide 20 milliGRAM(s) Oral daily      Assessment:  -atypical cp with mildly elevated ce and no acute ischemic changes on ecg  -venous insufficiency  -acute rib fx of unclear etiology  -copd/asthma  -obesity  -melba  -severe anxiety  -hypothyroidism with suppressed TSH  -pAT    Recs:  -refusing cta coronaries. only wants coronary angiogram although i currently do not believe the benefits of a LHC outweigh the risk (given atypical sx, indeterminate troponins and no acute ischemic changes on ecg with recent TTE showing normal LV function) including bleeding, groin complications, sedation, etc. This has been explained to patient. she is also refusing a stress test. as such, will defer any further cardiac workup for now  -c/w home antihypertensives and diuretics  -c/w beta blockers for AT  -c/w bronchodilators  -consult pulm  -f/u psych  -keep legs elevated, compression stockings and c/w diuretic  -needs workup and evaluation of rib fx   -DVT ppx  -f/u  given patients reports she doesnt feel safe at home      Over 25 minutes spent on total encounter; more than 50% of the visit was spent counseling and/or coordinating care by the attending physician.      Juan Salcedo MD   Cardiovascular Disease  [phone]

## 2019-10-03 NOTE — DISCHARGE NOTE NURSING/CASE MANAGEMENT/SOCIAL WORK - NSDCFUADDAPPT_GEN_ALL_CORE_FT
Follow up for psychiatric treatment with [facility] [address], [place] [address] ph: [phone]

## 2019-10-03 NOTE — DISCHARGE NOTE PROVIDER - HOSPITAL COURSE
67 y/o F patient with a significant PMHx of Anxiety, Asthma, Breast Cancer, COPD, DVT, Graves disease, HLD, HTN, Hypothyroid, Kidney cancer, Obesity, Sleep apnea p/w 2 days of chest tightness, blurry vision, b/l hand pain/weakness/tremors and sob. Has had multiple evaluations and admission for similar presentation recently with neg w/u. Does not believe it is her asthma and has not tried to use her nebulizer and has not taken her prescribed ativan in several weeks because she cannot find the pills. Also endorses b/l leg swelling.  Patient was also found to have acute and chronic rib fractures possibly due to osteoporosis for which she will be worked up outpatient for with a DEXA scan. 67 y/o F patient with a significant PMHx of Anxiety, Asthma, Breast Cancer, COPD, DVT, Graves disease, HLD, HTN, Hypothyroid, Kidney cancer, Obesity, Sleep apnea p/w 2 days of chest tightness, blurry vision, b/l hand pain/weakness/tremors and sob. Has had multiple evaluations and admission for similar presentation recently with neg w/u. Does not believe it is her asthma and has not tried to use her nebulizer and has not taken her prescribed ativan in several weeks because she cannot find the pills. Also endorses b/l leg swelling.  Patient was also found to have acute and chronic rib fractures possibly due to osteoporosis for which she will be worked up outpatient for with a DEXA scan.  Echocardiagram showed normal LV function.  Patient refused CTA of the coronaries and stress testing. Patient followed by Psychiatry.  No psychiatric contraindications to discharge.  Follow up for outpatient treatment at [facility].  No contraindications to discharge. 67 y/o F patient with a significant PMHx of Anxiety, Asthma, Breast Cancer, COPD, DVT, Graves disease, HLD, HTN, Hypothyroid, Kidney cancer, Obesity, Sleep apnea p/w 2 days of chest tightness, blurry vision, b/l hand pain/weakness/tremors and sob. Has had multiple evaluations and admission for similar presentation recently with neg w/u. Does not believe it is her asthma and has not tried to use her nebulizer and has not taken her prescribed ativan in several weeks because she cannot find the pills. Also endorses b/l leg swelling.  Patient was also found to have acute and chronic rib fractures possibly due to osteoporosis for which she will be worked up outpatient for with a DEXA scan.  Patient had atypical chest pain with indeterminate troponins, ECG showed no acute ischemic changes and echocardiagram showed normal LV function.  Patient refused CTA of the coronaries and stress testing.  Further cardiac work up was deferred and patient should be followed up outpatient by Dr. Salcedo.  Patient followed by Psychiatry.  No psychiatric contraindications to discharge.  Follow up for outpatient treatment at [facility].  No contraindications to discharge. 67 y/o F patient with a significant PMHx of Anxiety, Asthma, Breast Cancer, COPD, DVT, Graves disease, HLD, HTN, Hypothyroid, Kidney cancer, Obesity, Sleep apnea p/w 2 days of chest tightness, blurry vision, b/l hand pain/weakness/tremors and sob. Has had multiple evaluations and admission for similar presentation recently with neg w/u. Does not believe it is her asthma and has not tried to use her nebulizer and has not taken her prescribed ativan in several weeks because she cannot find the pills. Also endorses b/l leg swelling.  Patient was also found to have acute and chronic rib fractures possibly due to osteoporosis for which she will be worked up outpatient for with a DEXA scan.  Patient had atypical chest pain with indeterminate troponins, ECG showed no acute ischemic changes and echocardiagram showed normal LV function.  Patient refused CTA of the coronaries and stress testing.  S/p coronary angiogram with no intervention required.  Further cardiac work up outpatient by Dr. Salcedo.  Patient followed by Psychiatry.  No psychiatric contraindications to discharge.  Follow up for outpatient treatment at [facility].  No contraindications to discharge.

## 2019-10-03 NOTE — PROGRESS NOTE BEHAVIORAL HEALTH - NSBHCHARTREVIEWIMAGING_PSY_A_CORE FT
< from: CT Angio Chest w/ IV Cont (09.30.19 @ 18:44) >    EXAM:  CT ANGIO CHEST (W)AW IC                          PROCEDURE DATE:  09/30/2019      INTERPRETATION:  CLINICAL INFORMATION: Shortness of breath, numbness.   COMPARISON: Pulmonary CT angiogram from 4/30/2019.    PROCEDURE:   CT Angiography of the Chest.  90 ml of Omnipaque 350 was injected intravenously. 10 ml were discarded.  Sagittal and coronal reformats were performed as well as 3D (MIP)   reconstructions.    FINDINGS:    LUNGS AND AIRWAYS: Patent central airways.  Emphysema. Bilateral lower   lobe linear atelectasis.  PLEURA: No pleural effusion.  MEDIASTINUM AND MIYA: No lymphadenopathy.  VESSELS: Good opacification the pulmonary arterial tree. No pulmonary   embolism. Aortic atherosclerotic calcifications.  HEART: Cardiomegaly. No pericardial effusion.  CHEST WALL AND LOWER NECK: Within normal limits.  VISUALIZED UPPER ABDOMEN: Left renal cyst.  BONES: Acute appearing right anterior third rib fracture. Additional   bilateral subacute to chronic rib fractures. Spinal degenerative changes.   Upper thoracic spinal fusion hardware.    IMPRESSION:     No pulmonary embolism.    Acute appearing right anterior third rib fracture. Multiple bilateral   subacute to chronic rib fractures.    Emphysema    DINORAH GUILLEN M.D., RADIOLOGY RESIDENT  This document has been electronically signed.  MARIUSZ MORAN M.D., ATTENDING RADIOLOGIST  This document has been electronically signed. Sep 30 2019  7:01PM

## 2019-10-03 NOTE — PROGRESS NOTE BEHAVIORAL HEALTH - NSBHCHARTREVIEWLAB_PSY_A_CORE FT
12.7   11.43 )-----------( 213      ( 02 Oct 2019 12:13 )             42.6     10-03    143  |  97  |  20  ----------------------------<  130<H>  4.0   |  34<H>  |  1.04    Ca    9.8      03 Oct 2019 11:07

## 2019-10-03 NOTE — CHART NOTE - NSCHARTNOTEFT_GEN_A_CORE
Anna To  MR#: [id number]    Labs came back showing an elevated WBC of 17.44 and elevated Troponin of 117.  Discussed with Dr. Welch who spoke with Cardiology.  Plan to keep patient here for angiogram of her coronaries tomorrow.      Ketan Leon PA-C  Department of Medicine  #[id number] Anna To  MR#: [id number]    Labs came back showing an elevated WBC of 17.44 and elevated Troponin of 117.  Discussed with Dr. Welch who spoke with Cardiology.  Leukocytosis is likely due to prednisone use.  Plan to keep patient here for angiogram of her coronaries tomorrow.      Ketan Leon PA-C  Department of Medicine  #[id number]

## 2019-10-04 LAB
ANION GAP SERPL CALC-SCNC: 7 MMOL/L — SIGNIFICANT CHANGE UP (ref 5–17)
BUN SERPL-MCNC: 16 MG/DL — SIGNIFICANT CHANGE UP (ref 7–23)
CALCIUM SERPL-MCNC: 9.6 MG/DL — SIGNIFICANT CHANGE UP (ref 8.4–10.5)
CHLORIDE SERPL-SCNC: 95 MMOL/L — LOW (ref 96–108)
CO2 SERPL-SCNC: 36 MMOL/L — HIGH (ref 22–31)
CREAT SERPL-MCNC: 0.65 MG/DL — SIGNIFICANT CHANGE UP (ref 0.5–1.3)
GLUCOSE SERPL-MCNC: 144 MG/DL — HIGH (ref 70–99)
HCT VFR BLD CALC: 39 % — SIGNIFICANT CHANGE UP (ref 34.5–45)
HGB BLD-MCNC: 11.5 G/DL — SIGNIFICANT CHANGE UP (ref 11.5–15.5)
INR BLD: 1.02 RATIO — SIGNIFICANT CHANGE UP (ref 0.88–1.16)
MAGNESIUM SERPL-MCNC: 2 MG/DL — SIGNIFICANT CHANGE UP (ref 1.6–2.6)
MCHC RBC-ENTMCNC: 26.4 PG — LOW (ref 27–34)
MCHC RBC-ENTMCNC: 29.5 GM/DL — LOW (ref 32–36)
MCV RBC AUTO: 89.7 FL — SIGNIFICANT CHANGE UP (ref 80–100)
PHOSPHATE SERPL-MCNC: 2.2 MG/DL — LOW (ref 2.5–4.5)
PLATELET # BLD AUTO: 175 K/UL — SIGNIFICANT CHANGE UP (ref 150–400)
POTASSIUM SERPL-MCNC: 3.7 MMOL/L — SIGNIFICANT CHANGE UP (ref 3.5–5.3)
POTASSIUM SERPL-SCNC: 3.7 MMOL/L — SIGNIFICANT CHANGE UP (ref 3.5–5.3)
PROTHROM AB SERPL-ACNC: 11.7 SEC — SIGNIFICANT CHANGE UP (ref 10–13.1)
RBC # BLD: 4.35 M/UL — SIGNIFICANT CHANGE UP (ref 3.8–5.2)
RBC # FLD: 15.9 % — HIGH (ref 10.3–14.5)
SODIUM SERPL-SCNC: 138 MMOL/L — SIGNIFICANT CHANGE UP (ref 135–145)
WBC # BLD: 14.66 K/UL — HIGH (ref 3.8–10.5)
WBC # FLD AUTO: 14.66 K/UL — HIGH (ref 3.8–10.5)

## 2019-10-04 PROCEDURE — 93454 CORONARY ARTERY ANGIO S&I: CPT | Mod: 26

## 2019-10-04 PROCEDURE — 99232 SBSQ HOSP IP/OBS MODERATE 35: CPT

## 2019-10-04 RX ORDER — IPRATROPIUM/ALBUTEROL SULFATE 18-103MCG
3 AEROSOL WITH ADAPTER (GRAM) INHALATION ONCE
Refills: 0 | Status: COMPLETED | OUTPATIENT
Start: 2019-10-04 | End: 2019-10-04

## 2019-10-04 RX ORDER — CHOLECALCIFEROL (VITAMIN D3) 125 MCG
2000 CAPSULE ORAL DAILY
Refills: 0 | Status: DISCONTINUED | OUTPATIENT
Start: 2019-10-04 | End: 2019-10-05

## 2019-10-04 RX ADMIN — Medication 5 MILLIGRAM(S): at 06:23

## 2019-10-04 RX ADMIN — Medication 0.5 MILLIGRAM(S): at 20:39

## 2019-10-04 RX ADMIN — Medication 2000 UNIT(S): at 14:05

## 2019-10-04 RX ADMIN — Medication 1 TABLET(S): at 13:35

## 2019-10-04 RX ADMIN — Medication 650 MILLIGRAM(S): at 10:17

## 2019-10-04 RX ADMIN — GABAPENTIN 200 MILLIGRAM(S): 400 CAPSULE ORAL at 13:36

## 2019-10-04 RX ADMIN — ATORVASTATIN CALCIUM 40 MILLIGRAM(S): 80 TABLET, FILM COATED ORAL at 20:58

## 2019-10-04 RX ADMIN — AMLODIPINE BESYLATE 5 MILLIGRAM(S): 2.5 TABLET ORAL at 06:23

## 2019-10-04 RX ADMIN — Medication 3 MILLILITER(S): at 08:28

## 2019-10-04 RX ADMIN — OXYCODONE HYDROCHLORIDE 5 MILLIGRAM(S): 5 TABLET ORAL at 20:43

## 2019-10-04 RX ADMIN — CITALOPRAM 30 MILLIGRAM(S): 10 TABLET, FILM COATED ORAL at 13:36

## 2019-10-04 RX ADMIN — Medication 1 MILLIGRAM(S): at 06:24

## 2019-10-04 RX ADMIN — Medication 3 MILLILITER(S): at 16:43

## 2019-10-04 RX ADMIN — OXYCODONE HYDROCHLORIDE 5 MILLIGRAM(S): 5 TABLET ORAL at 11:18

## 2019-10-04 RX ADMIN — Medication 3 MILLIGRAM(S): at 20:43

## 2019-10-04 RX ADMIN — Medication 81 MILLIGRAM(S): at 13:35

## 2019-10-04 RX ADMIN — Medication 650 MILLIGRAM(S): at 11:17

## 2019-10-04 RX ADMIN — Medication 20 MILLIGRAM(S): at 06:23

## 2019-10-04 RX ADMIN — LOSARTAN POTASSIUM 100 MILLIGRAM(S): 100 TABLET, FILM COATED ORAL at 06:23

## 2019-10-04 RX ADMIN — Medication 1 MILLIGRAM(S): at 13:36

## 2019-10-04 RX ADMIN — Medication 0.5 MILLIGRAM(S): at 13:31

## 2019-10-04 RX ADMIN — OXYCODONE HYDROCHLORIDE 5 MILLIGRAM(S): 5 TABLET ORAL at 21:30

## 2019-10-04 RX ADMIN — GABAPENTIN 200 MILLIGRAM(S): 400 CAPSULE ORAL at 06:23

## 2019-10-04 RX ADMIN — Medication 3 MILLILITER(S): at 23:19

## 2019-10-04 RX ADMIN — Medication 1 MILLIGRAM(S): at 22:15

## 2019-10-04 RX ADMIN — Medication 1 MILLIGRAM(S): at 15:20

## 2019-10-04 RX ADMIN — GABAPENTIN 200 MILLIGRAM(S): 400 CAPSULE ORAL at 20:58

## 2019-10-04 RX ADMIN — Medication 0.5 MILLIGRAM(S): at 07:57

## 2019-10-04 RX ADMIN — Medication 100 MICROGRAM(S): at 06:23

## 2019-10-04 RX ADMIN — OXYCODONE HYDROCHLORIDE 5 MILLIGRAM(S): 5 TABLET ORAL at 10:18

## 2019-10-04 NOTE — PROGRESS NOTE ADULT - SUBJECTIVE AND OBJECTIVE BOX
Cardiovascular Disease Progress Note    Overnight events: No acute events overnight.  slept well. currently cp free.plan for [facility] today. no new cardiac sx  Otherwise review of systems negative    Objective Findings:  T(C): 36.8 (10-04-19 @ 06:14), Max: 36.9 (10-03-19 @ 11:34)  HR: 72 (10-04-19 @ 06:14) (67 - 102)  BP: 127/59 (10-04-19 @ 06:14) (111/68 - 127/59)  RR: 18 (10-04-19 @ 06:14) (18 - 18)  SpO2: 100% (10-04-19 @ 06:14) (100% - 100%)  Wt(kg): --  Daily     Daily       Physical Exam:  Gen: NAD  HEENT: EOMI  CV: RRR, normal S1 + S2, no m/r/g  Lungs: CTAB  Abd: soft, non-tender  Ext: minimal edema    Telemetry: nsr    Laboratory Data:                        12.8   17.44 )-----------( 221      ( 03 Oct 2019 14:09 )             42.0     10-03    143  |  97  |  20  ----------------------------<  130<H>  4.0   |  34<H>  |  1.04    Ca    9.8      03 Oct 2019 11:07                Inpatient Medications:  MEDICATIONS  (STANDING):  amLODIPine   Tablet 5 milliGRAM(s) Oral daily  aspirin  chewable 81 milliGRAM(s) Oral daily  atorvastatin 40 milliGRAM(s) Oral at bedtime  citalopram 30 milliGRAM(s) Oral daily  docusate sodium 100 milliGRAM(s) Oral three times a day  enoxaparin Injectable 40 milliGRAM(s) SubCutaneous daily  folic acid 1 milliGRAM(s) Oral daily  gabapentin 200 milliGRAM(s) Oral three times a day  influenza   Vaccine 0.5 milliLiter(s) IntraMuscular once  levothyroxine 100 MICROGram(s) Oral daily  LORazepam     Tablet 1 milliGRAM(s) Oral every 8 hours  losartan 100 milliGRAM(s) Oral daily  multivitamin 1 Tablet(s) Oral daily  predniSONE   Tablet 5 milliGRAM(s) Oral daily  torsemide 20 milliGRAM(s) Oral daily      Assessment:  -atypical cp with mildly elevated ce and no acute ischemic changes on ecg  -venous insufficiency  -acute rib fx of unclear etiology  -copd/asthma  -obesity  -melba  -severe anxiety  -hypothyroidism with suppressed TSH  -pAT    Recs:  -elevated cardiac enzymes with ongoing chest pains - currently cp free - plan for LHC with dr james today. low suspicion for ACS.  -c/w home antihypertensives and diuretics  -c/w beta blockers for AT  -c/w bronchodilators  -consult pulm  -f/u psych  -keep legs elevated, compression stockings and c/w diuretic  -needs workup and evaluation of rib fx   -DVT ppx        Over 25 minutes spent on total encounter; more than 50% of the visit was spent counseling and/or coordinating care by the attending physician.      Juan Salcedo MD   Cardiovascular Disease  [phone]

## 2019-10-04 NOTE — PROGRESS NOTE BEHAVIORAL HEALTH - SUMMARY
Pt is a 69 yo   female, living with her  and developmentally disabled adult son, with psychiatric hx of depression, anxiety, psychosis and prior hospitalizations (most recently at [facility] in 11/2015), with 1 prior SA by cutting her wrists in 1982, and extensive medical hx, including asthma, COPD (quit smoking in 1997), HTN, HLD, Grave's disease, hypothyroidism, breast CA, kidney CA, cholecystectomy, laminectomy (difficulty ambulating as result of complications from the procedure) admitted with chest pain and difficulty breathing. Psychiatry was consulted for anxiety.    On interview pt reported anxiety symptoms, mostly worrying about her health, panic attack symptoms. Pt has received Ativan 1 mg PO x4 and Ativan 0.5 mg IV x1 over the past 24 hrs for agitation. pt has been off her home dose ativan for few weeks.

## 2019-10-04 NOTE — PROGRESS NOTE BEHAVIORAL HEALTH - NSBHCHARTREVIEWINVESTIGATE_PSY_A_CORE FT
< from: 12 Lead ECG (10.01.19 @ 01:28) >    Ventricular Rate 50 BPM    Atrial Rate 50 BPM    P-R Interval 148 ms    QRS Duration 86 ms    Q-T Interval 422 ms    QTC Calculation(Bezet) 384 ms      < end of copied text >
< from: 12 Lead ECG (09.30.19 @ 12:45) >    Ventricular Rate 53 BPM    Atrial Rate 53 BPM    P-R Interval 142 ms    QRS Duration 86 ms    Q-T Interval 414 ms    QTC Calculation(Bezet) 388 ms    P Axis 65 degrees    R Axis 2 degrees    T Axis 23 degrees    Diagnosis Line SINUS BRADYCARDIA  POSSIBLE ANTERIOR INFARCT , AGE UNDETERMINED  ABNORMAL ECG    Confirmed by ATTENDING, ED ([id number]),  ANDRE PINK ([id number]) on 10/1/2019 4:37:33 AM

## 2019-10-04 NOTE — PROGRESS NOTE BEHAVIORAL HEALTH - NSBHFUPINTERVALHXFT_PSY_A_CORE
Pt. seen and evaluated covering for Dr. Diamond at patient's 's request. Chart, labs, meds reviewed, staff consulted    Patient continues to reiterate her desire to go home. She is focused on somatic sx. and issues, as well as on her desire to go home, despite stating how she does not agree with him, or how he is sometimes harsh towards her.     states patient is difficult to handle, refusing care, non-compliant at times and having intermittent angry outbursts, accusing him of trying to harm her. Her condition appears to be chronic and little can be accomplished by admitting the patient to inpatient Psych. unit.

## 2019-10-04 NOTE — PROGRESS NOTE BEHAVIORAL HEALTH - NSBHCHARTREVIEWLAB_PSY_A_CORE FT
11.5   14.66 )-----------( 175      ( 04 Oct 2019 14:12 )             39.0     10-04    138  |  95<L>  |  16  ----------------------------<  144<H>  3.7   |  36<H>  |  0.65    Ca    9.6      04 Oct 2019 11:25  Phos  2.2     10-04  Mg     2.0     10-04

## 2019-10-04 NOTE — PROGRESS NOTE ADULT - ASSESSMENT
_________________________________________________________________________________________  ========>>  M E D I C A L   A T T E N D I N G    F O L L O W  U P  N O T E  <<=========  -----------------------------------------------------------------------------------------------------    - Patient seen and examined by me earlier today.   - In summary,  SHEKHAR VASQUEZ is a 68y year old woman who originally presented with chest pain   - Patient today overall doing the same , comfortable, eating OK.  chest pain on and off      pt still with occasional chest pain and SOB, states  asking for more pain meds    ==================>> REVIEW OF SYSTEM <<=================    GEN: no fever  RESP: + occasional SOB, no cough, no sputum  CVS: + chest pain,  no palpitations, no edema   GI: no abdominal pain, no nausea, no constipation, no diarrhea  : no dysuria, no frequency, no hematuria  Neuro: no headache, no dizziness  Derm : no itching, no rash    ==================>> PHYSICAL EXAM <<=================    GEN: A&O X 3 , NAD , comfortable but anxious in bed  HEENT: NCAT, PERRL, MMM, hearing intact  Neck: supple , no JVD  CVS: S1S2 , regular , No M/R/G appreciated  PULM: CTA B/L,  decreased BS   ABD.: soft. non tender, non distended,  bowel sounds present  Extrem: intact pulses , no edema   PSYCH : + anxious      ==================>> MEDICATIONS <<====================    amLODIPine   Tablet 5 milliGRAM(s) Oral daily  aspirin  chewable 81 milliGRAM(s) Oral daily  atorvastatin 40 milliGRAM(s) Oral at bedtime  cholecalciferol 2000 Unit(s) Oral daily  citalopram 30 milliGRAM(s) Oral daily  docusate sodium 100 milliGRAM(s) Oral three times a day  enoxaparin Injectable 40 milliGRAM(s) SubCutaneous daily  folic acid 1 milliGRAM(s) Oral daily  gabapentin 200 milliGRAM(s) Oral three times a day  influenza   Vaccine 0.5 milliLiter(s) IntraMuscular once  levothyroxine 100 MICROGram(s) Oral daily  LORazepam     Tablet 1 milliGRAM(s) Oral every 8 hours  losartan 100 milliGRAM(s) Oral daily  multivitamin 1 Tablet(s) Oral daily  predniSONE   Tablet 5 milliGRAM(s) Oral daily  torsemide 20 milliGRAM(s) Oral daily    MEDICATIONS  (PRN):  acetaminophen   Tablet .. 650 milliGRAM(s) Oral every 6 hours PRN Temp greater or equal to 38.5C (101.3F), Moderate Pain (4 - 6)  ALBUTerol/ipratropium for Nebulization 3 milliLiter(s) Nebulizer every 6 hours PRN Shortness of Breath and/or Wheezing  LORazepam   Injectable 0.5 milliGRAM(s) IV Push every 6 hours PRN severe anxiety  melatonin 3 milliGRAM(s) Oral at bedtime PRN Insomnia  oxyCODONE    IR 5 milliGRAM(s) Oral every 6 hours PRN Moderate Pain (4 - 6)    ==================>> VITAL SIGNS <<==================    Vital Signs Last 24 Hrs  T(C): 37 (10-04-19 @ 11:26)  T(F): 98.6 (10-04-19 @ 11:26), Max: 98.6 (10-04-19 @ 11:26)  HR: 72 (10-04-19 @ 06:14) (67 - 72)  BP: 100/65 (10-04-19 @ 11:26)  RR: 18 (10-04-19 @ 11:26) (18 - 18)  SpO2: 100% (10-04-19 @ 11:26) (100% - 100%)       ==================>> LAB AND IMAGING <<==================                        11.5   14.66 )-----------( 175      ( 04 Oct 2019 14:12 )             39.0        10-04    138  |  95<L>  |  16  ----------------------------<  144<H>  3.7   |  36<H>  |  0.65    Ca    9.6      04 Oct 2019 11:25  Phos  2.2     10-04  Mg     2.0     10-04      WBC count:   14.66 <<== ,  17.44 <<== ,  11.43 <<== ,  16.0 <<==   Hemoglobin:   11.5 <<==,  12.8 <<==,  12.7 <<==,  12.5 <<==  platelets:  175 <==, 221 <==, 213 <==, 180 <==    Creatinine:  0.65  <<==, 1.04  <<==, 0.85  <<==, 0.58  <<==  Sodium:   138  <==, 143  <==, 141  <==, 139  <==    ___________________________________________________________________________________  ===============>>  A S S E S S M E N T   A N D   P L A N <<===============  ------------------------------------------------------------------------------------------    · Assessment		  67 y/o F patient with a significant PMHx of Anxiety, Asthma, Breast Cancer, COPD, DVT, Graves disease, HLD, HTN, Hypothyroid, Kidney cancer, Obesity, Sleep apnea p/w 2 days of chest tightness, blurry vision, b/l hand pain/weakness/tremors and sob.        Problem/Plan - 1:  ·  Problem: Chest pain, likely multifactorial given anxiety, rib fracture      need to rule out CAD as cause given now with elevated trop       pt declined CT coronaries and stress test         appreciated Cardiology note and discussed::  for cath today     Problem/Plan - 2:  ·  Problem: Acute on chronic respiratory failure with hypoxia  Oxygen NC as needed  Nebs PRN  pt uses O2 at home      Problem/Plan - 3:  ·  Problem: COPD   Neb Rx .      Problem/Plan - 4:  ·  Problem: Hypertension.    BP meds with hold parameters.      Problem/Plan - 5:  ·  Problem: Hypothyroid.    Synthroid   monitor TFT as OP     ( TSH was low before as pt was not taking meds properly)      Problem/Plan - 6:  Problem: Rib fractures     new and old rib fractures in pt who has been on chronic steroids likely osteoporosis due to steroids    will check vitamin D level      needs Dexa scan as OP    Oxy for pain as needed      Problem/Plan - 7:  Problem: Anxiety & Depression   benzo  Psychiatry f/u     asking for second opinion  also needs follow up with [facility] as OP     -GI/DVT Prophylaxis.    dispo plan pending above   --------------------------------------------  Case discussed with pt, NP, cardio.. RN..   Education given on findings and plan of care  ___________________________  H. JANICE Welch.  Pager: [phone]

## 2019-10-05 VITALS — RESPIRATION RATE: 18 BRPM | OXYGEN SATURATION: 95 %

## 2019-10-05 PROCEDURE — 82553 CREATINE MB FRACTION: CPT

## 2019-10-05 PROCEDURE — 83735 ASSAY OF MAGNESIUM: CPT

## 2019-10-05 PROCEDURE — 80048 BASIC METABOLIC PNL TOTAL CA: CPT

## 2019-10-05 PROCEDURE — 85730 THROMBOPLASTIN TIME PARTIAL: CPT

## 2019-10-05 PROCEDURE — 71045 X-RAY EXAM CHEST 1 VIEW: CPT

## 2019-10-05 PROCEDURE — C1887: CPT

## 2019-10-05 PROCEDURE — 71275 CT ANGIOGRAPHY CHEST: CPT

## 2019-10-05 PROCEDURE — 85379 FIBRIN DEGRADATION QUANT: CPT

## 2019-10-05 PROCEDURE — 84480 ASSAY TRIIODOTHYRONINE (T3): CPT

## 2019-10-05 PROCEDURE — 75574 CT ANGIO HRT W/3D IMAGE: CPT

## 2019-10-05 PROCEDURE — 82306 VITAMIN D 25 HYDROXY: CPT

## 2019-10-05 PROCEDURE — 84436 ASSAY OF TOTAL THYROXINE: CPT

## 2019-10-05 PROCEDURE — 84484 ASSAY OF TROPONIN QUANT: CPT

## 2019-10-05 PROCEDURE — 84443 ASSAY THYROID STIM HORMONE: CPT

## 2019-10-05 PROCEDURE — 81001 URINALYSIS AUTO W/SCOPE: CPT

## 2019-10-05 PROCEDURE — 84100 ASSAY OF PHOSPHORUS: CPT

## 2019-10-05 PROCEDURE — 80053 COMPREHEN METABOLIC PANEL: CPT

## 2019-10-05 PROCEDURE — 93454 CORONARY ARTERY ANGIO S&I: CPT

## 2019-10-05 PROCEDURE — 93005 ELECTROCARDIOGRAM TRACING: CPT

## 2019-10-05 PROCEDURE — 83880 ASSAY OF NATRIURETIC PEPTIDE: CPT

## 2019-10-05 PROCEDURE — 93970 EXTREMITY STUDY: CPT

## 2019-10-05 PROCEDURE — 82550 ASSAY OF CK (CPK): CPT

## 2019-10-05 PROCEDURE — C1894: CPT

## 2019-10-05 PROCEDURE — 85027 COMPLETE CBC AUTOMATED: CPT

## 2019-10-05 PROCEDURE — 85610 PROTHROMBIN TIME: CPT

## 2019-10-05 PROCEDURE — C1769: CPT

## 2019-10-05 PROCEDURE — 99285 EMERGENCY DEPT VISIT HI MDM: CPT | Mod: 25

## 2019-10-05 PROCEDURE — 94640 AIRWAY INHALATION TREATMENT: CPT

## 2019-10-05 PROCEDURE — 84439 ASSAY OF FREE THYROXINE: CPT

## 2019-10-05 PROCEDURE — 87086 URINE CULTURE/COLONY COUNT: CPT

## 2019-10-05 RX ORDER — CHOLECALCIFEROL (VITAMIN D3) 125 MCG
1 CAPSULE ORAL
Qty: 0 | Refills: 0 | DISCHARGE
Start: 2019-10-05

## 2019-10-05 RX ADMIN — Medication 1 TABLET(S): at 11:40

## 2019-10-05 RX ADMIN — Medication 3 MILLILITER(S): at 12:39

## 2019-10-05 RX ADMIN — Medication 1 MILLIGRAM(S): at 11:40

## 2019-10-05 RX ADMIN — CITALOPRAM 30 MILLIGRAM(S): 10 TABLET, FILM COATED ORAL at 11:40

## 2019-10-05 RX ADMIN — GABAPENTIN 200 MILLIGRAM(S): 400 CAPSULE ORAL at 12:38

## 2019-10-05 RX ADMIN — Medication 2000 UNIT(S): at 11:40

## 2019-10-05 RX ADMIN — Medication 1 MILLIGRAM(S): at 06:08

## 2019-10-05 RX ADMIN — Medication 0.5 MILLIGRAM(S): at 09:19

## 2019-10-05 RX ADMIN — Medication 20 MILLIGRAM(S): at 06:09

## 2019-10-05 RX ADMIN — ENOXAPARIN SODIUM 40 MILLIGRAM(S): 100 INJECTION SUBCUTANEOUS at 11:40

## 2019-10-05 RX ADMIN — Medication 1 MILLIGRAM(S): at 12:38

## 2019-10-05 RX ADMIN — AMLODIPINE BESYLATE 5 MILLIGRAM(S): 2.5 TABLET ORAL at 06:08

## 2019-10-05 RX ADMIN — Medication 100 MICROGRAM(S): at 06:08

## 2019-10-05 RX ADMIN — GABAPENTIN 200 MILLIGRAM(S): 400 CAPSULE ORAL at 06:09

## 2019-10-05 RX ADMIN — OXYCODONE HYDROCHLORIDE 5 MILLIGRAM(S): 5 TABLET ORAL at 06:08

## 2019-10-05 RX ADMIN — Medication 81 MILLIGRAM(S): at 11:40

## 2019-10-05 RX ADMIN — LOSARTAN POTASSIUM 100 MILLIGRAM(S): 100 TABLET, FILM COATED ORAL at 06:08

## 2019-10-05 RX ADMIN — OXYCODONE HYDROCHLORIDE 5 MILLIGRAM(S): 5 TABLET ORAL at 12:38

## 2019-10-05 RX ADMIN — Medication 5 MILLIGRAM(S): at 06:08

## 2019-10-05 RX ADMIN — Medication 3 MILLILITER(S): at 06:08

## 2019-10-05 RX ADMIN — OXYCODONE HYDROCHLORIDE 5 MILLIGRAM(S): 5 TABLET ORAL at 06:56

## 2019-10-05 NOTE — PROGRESS NOTE ADULT - ASSESSMENT
M E D I C A L   A T T E N D I N G    F O L L O W    U P   N O T E                                     ------------------------------------------------------------------------------------------------    patient evaluated by me, case discussed with team, chart, medications, and physical exam reviewed, labs / tests  and vitals reviewed by me, as remedios.   Patient is stable for discharge today.  noted psych note, no plan for any other intervention at this time   Patient to follow up with  PMD, cardio, Psych,  pulm   See discharge document for full note.      ==================>> MEDICATIONS <<====================    amLODIPine   Tablet 5 milliGRAM(s) Oral daily  aspirin  chewable 81 milliGRAM(s) Oral daily  atorvastatin 40 milliGRAM(s) Oral at bedtime  cholecalciferol 2000 Unit(s) Oral daily  citalopram 30 milliGRAM(s) Oral daily  docusate sodium 100 milliGRAM(s) Oral three times a day  enoxaparin Injectable 40 milliGRAM(s) SubCutaneous daily  folic acid 1 milliGRAM(s) Oral daily  gabapentin 200 milliGRAM(s) Oral three times a day  influenza   Vaccine 0.5 milliLiter(s) IntraMuscular once  levothyroxine 100 MICROGram(s) Oral daily  LORazepam     Tablet 1 milliGRAM(s) Oral every 8 hours  losartan 100 milliGRAM(s) Oral daily  multivitamin 1 Tablet(s) Oral daily  predniSONE   Tablet 5 milliGRAM(s) Oral daily  torsemide 20 milliGRAM(s) Oral daily    MEDICATIONS  (PRN):  acetaminophen   Tablet .. 650 milliGRAM(s) Oral every 6 hours PRN Temp greater or equal to 38.5C (101.3F), Moderate Pain (4 - 6)  ALBUTerol/ipratropium for Nebulization 3 milliLiter(s) Nebulizer every 6 hours PRN Shortness of Breath and/or Wheezing  LORazepam   Injectable 0.5 milliGRAM(s) IV Push every 6 hours PRN severe anxiety  melatonin 3 milliGRAM(s) Oral at bedtime PRN Insomnia  oxyCODONE    IR 5 milliGRAM(s) Oral every 6 hours PRN Moderate Pain (4 - 6)    ==================>> VITAL SIGNS <<==================    T(C): 36.4 (10-05-19 @ 04:24), Max: 37 (10-04-19 @ 11:26)  HR: 52 (10-05-19 @ 04:24) (52 - 84)  BP: 136/76 (10-05-19 @ 04:24) (100/65 - 148/96)  RR: 18 (10-05-19 @ 04:24) (16 - 20)  SpO2: 100% (10-05-19 @ 04:24) (94% - 100%)     I&O's Summary    04 Oct 2019 07:01  -  05 Oct 2019 07:00  --------------------------------------------------------  IN: 720 mL / OUT: 1700 mL / NET: -980 mL    05 Oct 2019 07:01  -  05 Oct 2019 10:25  --------------------------------------------------------  IN: 360 mL / OUT: 0 mL / NET: 360 mL       ==================>> LAB AND IMAGING <<==================                        11.5   14.66 )-----------( 175      ( 04 Oct 2019 14:12 )             39.0        10-04    138  |  95<L>  |  16  ----------------------------<  144<H>  3.7   |  36<H>  |  0.65    Ca    9.6      04 Oct 2019 11:25  Phos  2.2     10-04  Mg     2.0     10-04    PT/INR - ( 04 Oct 2019 15:03 )   PT: 11.7 sec;   INR: 1.02 ratio         TSH:      0.64   (09-30-19)       ,     < 0.10   (09-11-19)           WBC count:   14.66 <<== ,  17.44 <<== ,  11.43 <<== ,  16.0 <<==   Hemoglobin:   11.5 <<==,  12.8 <<==,  12.7 <<==,  12.5 <<==  platelets:  175 <==, 221 <==, 213 <==, 180 <==    Creatinine:  0.65  <<==, 1.04  <<==, 0.85  <<==, 0.58  <<==  Sodium:   138  <==, 143  <==, 141  <==, 139  <==    prelim cath report: no intervention ( official not available in EMR)

## 2019-10-15 NOTE — PROGRESS NOTE ADULT - PROBLEM/PLAN-1
DISPLAY PLAN FREE TEXT
PAST SURGICAL HISTORY:  H/O colectomy     History of repair of hiatal hernia     S/P appendectomy     S/P hysterectomy

## 2019-11-09 ENCOUNTER — INPATIENT (INPATIENT)
Facility: HOSPITAL | Age: 69
LOS: 4 days | Discharge: NURSING, REHAB OR LONG-TERM CARE | DRG: 880 | End: 2019-11-14
Attending: INTERNAL MEDICINE | Admitting: INTERNAL MEDICINE
Payer: MEDICARE

## 2019-11-09 VITALS
RESPIRATION RATE: 18 BRPM | HEART RATE: 68 BPM | SYSTOLIC BLOOD PRESSURE: 149 MMHG | HEIGHT: 60 IN | WEIGHT: 160.06 LBS | DIASTOLIC BLOOD PRESSURE: 78 MMHG | TEMPERATURE: 98 F | OXYGEN SATURATION: 97 %

## 2019-11-09 DIAGNOSIS — Z98.89 OTHER SPECIFIED POSTPROCEDURAL STATES: Chronic | ICD-10-CM

## 2019-11-09 DIAGNOSIS — R06.02 SHORTNESS OF BREATH: ICD-10-CM

## 2019-11-09 DIAGNOSIS — Z90.5 ACQUIRED ABSENCE OF KIDNEY: Chronic | ICD-10-CM

## 2019-11-09 DIAGNOSIS — Z71.89 OTHER SPECIFIED COUNSELING: ICD-10-CM

## 2019-11-09 DIAGNOSIS — Z29.9 ENCOUNTER FOR PROPHYLACTIC MEASURES, UNSPECIFIED: ICD-10-CM

## 2019-11-09 DIAGNOSIS — Z90.49 ACQUIRED ABSENCE OF OTHER SPECIFIED PARTS OF DIGESTIVE TRACT: Chronic | ICD-10-CM

## 2019-11-09 DIAGNOSIS — I10 ESSENTIAL (PRIMARY) HYPERTENSION: ICD-10-CM

## 2019-11-09 DIAGNOSIS — F41.9 ANXIETY DISORDER, UNSPECIFIED: ICD-10-CM

## 2019-11-09 DIAGNOSIS — E87.6 HYPOKALEMIA: ICD-10-CM

## 2019-11-09 DIAGNOSIS — E78.5 HYPERLIPIDEMIA, UNSPECIFIED: ICD-10-CM

## 2019-11-09 DIAGNOSIS — E03.9 HYPOTHYROIDISM, UNSPECIFIED: ICD-10-CM

## 2019-11-09 DIAGNOSIS — I82.409 ACUTE EMBOLISM AND THROMBOSIS OF UNSPECIFIED DEEP VEINS OF UNSPECIFIED LOWER EXTREMITY: ICD-10-CM

## 2019-11-09 DIAGNOSIS — J44.9 CHRONIC OBSTRUCTIVE PULMONARY DISEASE, UNSPECIFIED: ICD-10-CM

## 2019-11-09 LAB
ALBUMIN SERPL ELPH-MCNC: 3.2 G/DL — LOW (ref 3.5–5)
ALP SERPL-CCNC: 87 U/L — SIGNIFICANT CHANGE UP (ref 40–120)
ALT FLD-CCNC: 24 U/L DA — SIGNIFICANT CHANGE UP (ref 10–60)
ANION GAP SERPL CALC-SCNC: 7 MMOL/L — SIGNIFICANT CHANGE UP (ref 5–17)
AST SERPL-CCNC: 11 U/L — SIGNIFICANT CHANGE UP (ref 10–40)
BILIRUB SERPL-MCNC: 1.5 MG/DL — HIGH (ref 0.2–1.2)
BUN SERPL-MCNC: 16 MG/DL — SIGNIFICANT CHANGE UP (ref 7–18)
CALCIUM SERPL-MCNC: 9.3 MG/DL — SIGNIFICANT CHANGE UP (ref 8.4–10.5)
CHLORIDE SERPL-SCNC: 100 MMOL/L — SIGNIFICANT CHANGE UP (ref 96–108)
CO2 SERPL-SCNC: 35 MMOL/L — HIGH (ref 22–31)
CREAT SERPL-MCNC: 0.69 MG/DL — SIGNIFICANT CHANGE UP (ref 0.5–1.3)
GLUCOSE SERPL-MCNC: 87 MG/DL — SIGNIFICANT CHANGE UP (ref 70–99)
HCT VFR BLD CALC: 40.7 % — SIGNIFICANT CHANGE UP (ref 34.5–45)
HGB BLD-MCNC: 12.5 G/DL — SIGNIFICANT CHANGE UP (ref 11.5–15.5)
MCHC RBC-ENTMCNC: 27.4 PG — SIGNIFICANT CHANGE UP (ref 27–34)
MCHC RBC-ENTMCNC: 30.7 GM/DL — LOW (ref 32–36)
MCV RBC AUTO: 89.1 FL — SIGNIFICANT CHANGE UP (ref 80–100)
NRBC # BLD: 0 /100 WBCS — SIGNIFICANT CHANGE UP (ref 0–0)
NT-PROBNP SERPL-SCNC: 168 PG/ML — HIGH (ref 0–125)
PLATELET # BLD AUTO: 221 K/UL — SIGNIFICANT CHANGE UP (ref 150–400)
POTASSIUM SERPL-MCNC: 3.1 MMOL/L — LOW (ref 3.5–5.3)
POTASSIUM SERPL-SCNC: 3.1 MMOL/L — LOW (ref 3.5–5.3)
PROT SERPL-MCNC: 6.2 G/DL — SIGNIFICANT CHANGE UP (ref 6–8.3)
RBC # BLD: 4.57 M/UL — SIGNIFICANT CHANGE UP (ref 3.8–5.2)
RBC # FLD: 16.6 % — HIGH (ref 10.3–14.5)
SODIUM SERPL-SCNC: 142 MMOL/L — SIGNIFICANT CHANGE UP (ref 135–145)
TROPONIN I SERPL-MCNC: <0.015 NG/ML — SIGNIFICANT CHANGE UP (ref 0–0.04)
WBC # BLD: 16.42 K/UL — HIGH (ref 3.8–10.5)
WBC # FLD AUTO: 16.42 K/UL — HIGH (ref 3.8–10.5)

## 2019-11-09 PROCEDURE — 99285 EMERGENCY DEPT VISIT HI MDM: CPT

## 2019-11-09 PROCEDURE — 93010 ELECTROCARDIOGRAM REPORT: CPT

## 2019-11-09 PROCEDURE — 71045 X-RAY EXAM CHEST 1 VIEW: CPT | Mod: 26

## 2019-11-09 RX ORDER — ATORVASTATIN CALCIUM 80 MG/1
40 TABLET, FILM COATED ORAL AT BEDTIME
Refills: 0 | Status: DISCONTINUED | OUTPATIENT
Start: 2019-11-09 | End: 2019-11-14

## 2019-11-09 RX ORDER — HALOPERIDOL DECANOATE 100 MG/ML
1 INJECTION INTRAMUSCULAR ONCE
Refills: 0 | Status: COMPLETED | OUTPATIENT
Start: 2019-11-09 | End: 2019-11-09

## 2019-11-09 RX ORDER — ASPIRIN/CALCIUM CARB/MAGNESIUM 324 MG
81 TABLET ORAL DAILY
Refills: 0 | Status: DISCONTINUED | OUTPATIENT
Start: 2019-11-09 | End: 2019-11-14

## 2019-11-09 RX ORDER — MONTELUKAST 4 MG/1
1 TABLET, CHEWABLE ORAL
Qty: 0 | Refills: 0 | DISCHARGE

## 2019-11-09 RX ORDER — TRIHEXYPHENIDYL HCL 2 MG
2 TABLET ORAL
Refills: 0 | Status: DISCONTINUED | OUTPATIENT
Start: 2019-11-09 | End: 2019-11-14

## 2019-11-09 RX ORDER — MONTELUKAST 4 MG/1
10 TABLET, CHEWABLE ORAL AT BEDTIME
Refills: 0 | Status: DISCONTINUED | OUTPATIENT
Start: 2019-11-09 | End: 2019-11-11

## 2019-11-09 RX ORDER — ACETAMINOPHEN 500 MG
650 TABLET ORAL ONCE
Refills: 0 | Status: DISCONTINUED | OUTPATIENT
Start: 2019-11-09 | End: 2019-11-09

## 2019-11-09 RX ORDER — LEVOTHYROXINE SODIUM 125 MCG
125 TABLET ORAL
Refills: 0 | Status: DISCONTINUED | OUTPATIENT
Start: 2019-11-09 | End: 2019-11-14

## 2019-11-09 RX ORDER — ALPRAZOLAM 0.25 MG
0.25 TABLET ORAL ONCE
Refills: 0 | Status: DISCONTINUED | OUTPATIENT
Start: 2019-11-09 | End: 2019-11-09

## 2019-11-09 RX ORDER — AMLODIPINE BESYLATE 2.5 MG/1
2.5 TABLET ORAL DAILY
Refills: 0 | Status: DISCONTINUED | OUTPATIENT
Start: 2019-11-09 | End: 2019-11-14

## 2019-11-09 RX ORDER — GABAPENTIN 400 MG/1
100 CAPSULE ORAL THREE TIMES A DAY
Refills: 0 | Status: DISCONTINUED | OUTPATIENT
Start: 2019-11-09 | End: 2019-11-14

## 2019-11-09 RX ORDER — NYSTATIN CREAM 100000 [USP'U]/G
1 CREAM TOPICAL EVERY 12 HOURS
Refills: 0 | Status: COMPLETED | OUTPATIENT
Start: 2019-11-09 | End: 2019-11-11

## 2019-11-09 RX ORDER — ENOXAPARIN SODIUM 100 MG/ML
40 INJECTION SUBCUTANEOUS DAILY
Refills: 0 | Status: DISCONTINUED | OUTPATIENT
Start: 2019-11-09 | End: 2019-11-14

## 2019-11-09 RX ORDER — IPRATROPIUM/ALBUTEROL SULFATE 18-103MCG
3 AEROSOL WITH ADAPTER (GRAM) INHALATION EVERY 6 HOURS
Refills: 0 | Status: DISCONTINUED | OUTPATIENT
Start: 2019-11-09 | End: 2019-11-11

## 2019-11-09 RX ORDER — LOSARTAN POTASSIUM 100 MG/1
100 TABLET, FILM COATED ORAL DAILY
Refills: 0 | Status: DISCONTINUED | OUTPATIENT
Start: 2019-11-09 | End: 2019-11-14

## 2019-11-09 RX ORDER — CITALOPRAM 10 MG/1
10 TABLET, FILM COATED ORAL DAILY
Refills: 0 | Status: DISCONTINUED | OUTPATIENT
Start: 2019-11-09 | End: 2019-11-10

## 2019-11-09 RX ORDER — CHOLECALCIFEROL (VITAMIN D3) 125 MCG
1000 CAPSULE ORAL DAILY
Refills: 0 | Status: DISCONTINUED | OUTPATIENT
Start: 2019-11-09 | End: 2019-11-14

## 2019-11-09 RX ORDER — POTASSIUM CHLORIDE 20 MEQ
40 PACKET (EA) ORAL EVERY 4 HOURS
Refills: 0 | Status: COMPLETED | OUTPATIENT
Start: 2019-11-09 | End: 2019-11-09

## 2019-11-09 RX ORDER — FOLIC ACID 0.8 MG
1 TABLET ORAL DAILY
Refills: 0 | Status: DISCONTINUED | OUTPATIENT
Start: 2019-11-09 | End: 2019-11-14

## 2019-11-09 RX ORDER — LANOLIN ALCOHOL/MO/W.PET/CERES
3 CREAM (GRAM) TOPICAL AT BEDTIME
Refills: 0 | Status: DISCONTINUED | OUTPATIENT
Start: 2019-11-09 | End: 2019-11-14

## 2019-11-09 RX ADMIN — ATORVASTATIN CALCIUM 40 MILLIGRAM(S): 80 TABLET, FILM COATED ORAL at 21:41

## 2019-11-09 RX ADMIN — Medication 500 MILLIGRAM(S): at 08:49

## 2019-11-09 RX ADMIN — MONTELUKAST 10 MILLIGRAM(S): 4 TABLET, CHEWABLE ORAL at 21:41

## 2019-11-09 RX ADMIN — Medication 0.25 MILLIGRAM(S): at 08:48

## 2019-11-09 RX ADMIN — Medication 3 MILLIGRAM(S): at 21:41

## 2019-11-09 RX ADMIN — Medication 81 MILLIGRAM(S): at 12:56

## 2019-11-09 RX ADMIN — GABAPENTIN 100 MILLIGRAM(S): 400 CAPSULE ORAL at 17:15

## 2019-11-09 RX ADMIN — CITALOPRAM 10 MILLIGRAM(S): 10 TABLET, FILM COATED ORAL at 12:56

## 2019-11-09 RX ADMIN — Medication 500 MILLIGRAM(S): at 10:38

## 2019-11-09 RX ADMIN — HALOPERIDOL DECANOATE 1 MILLIGRAM(S): 100 INJECTION INTRAMUSCULAR at 11:04

## 2019-11-09 RX ADMIN — LOSARTAN POTASSIUM 100 MILLIGRAM(S): 100 TABLET, FILM COATED ORAL at 12:56

## 2019-11-09 RX ADMIN — Medication 40 MILLIEQUIVALENT(S): at 17:14

## 2019-11-09 RX ADMIN — Medication 1 MILLIGRAM(S): at 12:56

## 2019-11-09 RX ADMIN — Medication 1000 UNIT(S): at 12:56

## 2019-11-09 RX ADMIN — GABAPENTIN 100 MILLIGRAM(S): 400 CAPSULE ORAL at 21:41

## 2019-11-09 RX ADMIN — Medication 0.25 MILLIGRAM(S): at 05:29

## 2019-11-09 RX ADMIN — Medication 2 MILLIGRAM(S): at 17:16

## 2019-11-09 RX ADMIN — AMLODIPINE BESYLATE 2.5 MILLIGRAM(S): 2.5 TABLET ORAL at 18:33

## 2019-11-09 RX ADMIN — Medication 1 MILLIGRAM(S): at 13:00

## 2019-11-09 NOTE — ED ADULT NURSE NOTE - OBJECTIVE STATEMENT
pt c/o SOB, pt uses O2 at home PRN, pt started screaming of getting her medication and getting her home. pt c/o SOB, pt uses O2 at home PRN, pt started screaming of getting her medication and getting her home. also being "allergic to everything around her" pt is on INO2

## 2019-11-09 NOTE — H&P ADULT - PROBLEM SELECTOR PLAN 7
losartanam IMPROVE VTE Individual Risk Assessment    RISK                                                                Points    [  ] Previous VTE                                                  3    [  ] Thrombophilia                                               2    [  ] Lower limb paralysis                                      2        (unable to hold up >15 seconds)    [  ] Current Cancer                                              2         (within 6 months)  [X] Immobilization > 24 hrs                                1  [  ] ICU/CCU stay > 24 hours                              1  [X] Age > 60                                                      1    IMPROVE VTE Score _____2____  c/w Lovenox 40 mg qd  no need for GI ppx. - pt takes atorvastatin 20 mg at home  - c/w atorvastatin 40 mg   - f/u lipid profile  - Dash Diet

## 2019-11-09 NOTE — ED PROVIDER NOTE - PHYSICAL EXAMINATION
GENERAL: wells appearing, intermittently sleeping and then when woken up begins to yell at staff   HEAD: atraumatic   EYES: EOMI, pink conjunctiva   ENT: moist oral mucosa   CARDIAC: RRR, no edema, distal pulses present   RESPIRATORY: lungs CTAB, no increased work of breathing, nasal canula in place   GASTROINTESTINAL: no abdominal tenderness, no rebound or guarding, bowel sounds presents  GENITOURINARY: no CVA tenderness   MUSCULOSKELETAL: no deformity   NEUROLOGICAL: AAOx3, spontaneous movement of extremities   SKIN: intact   PSYCHIATRIC: cooperative  HEME LYMPH: no lymphadenopathy GENERAL: wells appearing, intermittently sleeping and then when woken up begins to yell at staff   HEAD: atraumatic   EYES: EOMI, pink conjunctiva   ENT: moist oral mucosa   CARDIAC: RRR, no edema, distal pulses present   RESPIRATORY: lungs CTAB, no increased work of breathing, nasal canula in place   GASTROINTESTINAL: no abdominal tenderness, no rebound or guarding, bowel sounds presents  GENITOURINARY: no CVA tenderness   MUSCULOSKELETAL: no deformity   NEUROLOGICAL: AAOx3, spontaneous movement of extremities   SKIN: intact   PSYCHIATRIC: cooperative, anxious   HEME LYMPH: no lymphadenopathy

## 2019-11-09 NOTE — ED PROVIDER NOTE - PATIENT PORTAL LINK FT
You can access the FollowMyHealth Patient Portal offered by [facility] by registering at the following website: [email or web address]. By joining [facility] [employer]’s FollowMyHealth portal, you will also be able to view your health information using other applications (apps) compatible with our system.

## 2019-11-09 NOTE — ED PROVIDER NOTE - CLINICAL SUMMARY MEDICAL DECISION MAKING FREE TEXT BOX
69 yo F with possible SOB. Plan - EKG, CXR, labs, anxiety meds as pt has severe anxiety when awake, reassess.

## 2019-11-09 NOTE — H&P ADULT - PROBLEM SELECTOR PLAN 8
IMPROVE VTE Individual Risk Assessment    RISK                                                                Points    [  ] Previous VTE                                                  3    [  ] Thrombophilia                                               2    [  ] Lower limb paralysis                                      2        (unable to hold up >15 seconds)    [  ] Current Cancer                                              2         (within 6 months)  [X] Immobilization > 24 hrs                                1  [  ] ICU/CCU stay > 24 hours                              1  [X] Age > 60                                                      1    IMPROVE VTE Score _____2____  c/w Lovenox 40 mg qd  no need for GI ppx. Goals of care discussed with patient and family   meaning of CPR described in detail and wants everything to be done after understanding risk associated with age.  Pt is FULL CODE.

## 2019-11-09 NOTE — H&P ADULT - PROBLEM SELECTOR PLAN 3
Prednisone  Monteulokast - p/w with worsening SOB   - PE: CTAB   - CXR : WNL  - WBC 16K  - O2 Sat 98 % on room air   - c/w Duoneb Q 6HR   - c/w Monteulokast  - Supportive care   - supplemental O2 if needed - K is 3.1 on admission,   - likely secondary to poor PO intake  - replacing KCl 40 mg x 2  - Continue to monitor electrolytes.  - f/u BMP

## 2019-11-09 NOTE — H&P ADULT - PROBLEM SELECTOR PLAN 5
- pt has hx of hypothyroidism   - Pt takes Synthroid at home 125 MCG  - c/w Synthroid 125 MCG  - f/u TSH - Pt taking Amlodipine and Losartan at home    - c/w  Carvedilol and Losartan with parameters  - Monitor BP closely and adjust medications if clinically indicated.  - DASH diet.

## 2019-11-09 NOTE — H&P ADULT - PROBLEM SELECTOR PLAN 9
Goals of care discussed with patient and family   meaning of CPR described in detail and wants everything to be done after understanding risk associated with age.  Pt is FULL CODE.

## 2019-11-09 NOTE — CHART NOTE - NSCHARTNOTEFT_GEN_A_CORE
Pt seen and chart reviewed. Well known to  psychiatric service - behavioral assessments reviewed from several previous admissions.  Psych consult requested due to concerns re: agitation/anxiety. Upon evaluation, pt is sedated - though she woke up briefly and reported that she felt chest tightness - she appeared to be breathing comfortably with NC - SPo2 at 100%. Note that pt would not participate in assessment at this time. Per psychiatric note from October 1, 2019 - pt with ongoing preoccupations related to her health. On last admission Celexa 30 mg po qdaily was recommended, she is currently on 10 mg would consider increasing dosage of Antidepressant in order to address ongoing anxiety.

## 2019-11-09 NOTE — H&P ADULT - PROBLEM SELECTOR PLAN 1
CXR : DARRYN - p/w SOB and anxiety   - according to past chart notes patient is known to go to 1-2 ERs per month for shortness of breath but it is usually related to panic attacks.   - Patient speaking in full sentences.   - CXR : WNL  - WBC 16K  - c/w  duonebs   - c/w lexapro   - c/w Alprazolam 1 mg  - c/w prednisone  - Supplemental Oxygen if needed  - fall precautions   - f/u PT for d/c planning   - f/u CM (pt has HHA 8hrs/7days)

## 2019-11-09 NOTE — H&P ADULT - PROBLEM SELECTOR PLAN 6
- pt takes atorvastatin 20 mg at home  - c/w atorvastatin 40 mg   - f/u lipid profile  - Dash Diet - pt has hx of hypothyroidism   - Pt takes Synthroid at home 125 MCG  - c/w Synthroid 125 MCG  - f/u TSH

## 2019-11-09 NOTE — H&P ADULT - PROBLEM SELECTOR PROBLEM 3
COPD (chronic obstructive pulmonary disease) R/O COPD (chronic obstructive pulmonary disease) Hypokalemia

## 2019-11-09 NOTE — H&P ADULT - PROBLEM SELECTOR PLAN 2
- K is 3.1 on admission,   - likely secondary to poor PO intake  - replacing KCl 40 mg x 2  - Continue to monitor electrolytes.  - f/u BMP - p/w SOB and anxiety   - according to past chart notes patient is known to go to 1-2 ERs per month for shortness of breath but it is usually related to panic attacks.   - c/w lexapro   - c/w Alprazolam 1 mg  - c/w Artane  ** Psych consulted Dr. Walton

## 2019-11-09 NOTE — H&P ADULT - HISTORY OF PRESENT ILLNESS
68 year old female from home lives with  (has HHA 8hrs/7days) with PMHx of Anxiety, HTN, HLD , NOEMY, asthma, COPD, DVT , Breast Cancer, Kidney cancer  in remission , Graves disease, Hypothyroid , S/P laminectomy , carpal tunnel syndrome, Obesity presents with vague complaint of SOB. Pt perseverating on idea that  is not giving her oxycodone as per usual dosing. Pt occasionally yelling out and that she is allergic to vital signs machine and then falls back asleep. pt has had multiple evaluations and admission for similar presentation recently with neg w/u. . pt denies chest pain , headache , dizziness, nausea , vomiting , diarrhea , abdominal pain , any change in bowel or urinary habits . Denies smoking, alcohol, illicit drug use.  allergic to shellfish , grapes , peaches .   	  In ED :   CXR : WNL   EKG : NSR    WBC 16K     Pt is Full code

## 2019-11-09 NOTE — ED ADULT NURSE REASSESSMENT NOTE - NS ED NURSE REASSESS COMMENT FT1
as per the pt "I want to speak to dr because I don'nt want to go home rather go to Nursing Home " . pt seen by DR Tahira Maldonado the [facility]  supervisor . pt is admitted fro social reason .

## 2019-11-09 NOTE — ED PROVIDER NOTE - NS ED ROS FT
CONSTITUTIONAL: no fever, no chills   EYES: no visual changes, no eye pain   ENMT: no nasal congestion, no throat pain  CARDIOVASCULAR: no chest pain, no edema, no palpitations   RESPIRATORY: +shortness of breath, no cough   GASTROINTESTINAL: no abdominal pain, no nausea, no vomiting, no diarrhea, no constipation   GENITOURINARY: no dysuria, no frequency  MUSCULOSKELETAL: no joint pains, no myalgias, no back pain   SKIN: no rashes  NEUROLOGICAL: no weakness, no headache, no dizziness, no slurred speech, no syncope   PSYCHIATRIC: no known mental health illness   HEME/LYMPH: no lymphadenopathy      All other ROS negative except as per HPI

## 2019-11-09 NOTE — H&P ADULT - ASSESSMENT
.            In ED :   CXR : WNL 68 year old female from home lives with  (has HHA 8hrs/7days) with PMHx of Anxiety, HTN, HLD , NOEMY, asthma, COPD, DVT , Breast Cancer, Kidney cancer  in remission , Graves disease, Hypothyroid , S/P laminectomy , carpal tunnel syndrome, Obesity presents with vague complaint of SOB. Pt perseverating on idea that  is not giving her oxycodone as per usual dosing. Pt occasionally yelling out and that she is allergic to vital signs machine and then falls back asleep. pt has had multiple evaluations and admission for similar presentation recently with neg w/u. . pt denies chest pain , headache , dizziness, nausea , vomiting , diarrhea , abdominal pain , any change in bowel or urinary habits . Denies smoking, alcohol, illicit drug use.  allergic to shellfish , grapes , peaches .   	  In ED :   CXR : WNL   EKG : NSR    WBC 16K       Pt is Full code  Pt is admitted for management of SOB and Stable safe discharge 68 year old female from home lives with  (has HHA 8hrs/7days) with PMHx of Anxiety, HTN, HLD , NOEMY, asthma, COPD, DVT , Breast Cancer, Kidney cancer  in remission , Graves disease, Hypothyroid , S/P laminectomy , carpal tunnel syndrome, Obesity presents with vague complaint of SOB. Pt perseverating on idea that  is not giving her oxycodone as per usual dosing. Pt occasionally yelling out and that she is allergic to vital signs machine and then falls back asleep. pt has had multiple evaluations and admission for similar presentation recently with neg w/u. . pt denies chest pain , headache , dizziness, nausea , vomiting , diarrhea , abdominal pain , any change in bowel or urinary habits . Denies smoking, alcohol, illicit drug use.  allergic to shellfish , grapes , peaches .   	  In ED :   CXR : WNL   EKG : NSR    WBC 16K     Pt is Full code    Pt is admitted for management of SOB and Stable safe discharge

## 2019-11-09 NOTE — ED PROVIDER NOTE - OBJECTIVE STATEMENT
69 yo F pmh of anxiety, HTN, OAS, asthma, COPD, and DVT presents with vague complaint of SOB. Pt perseverating on idea that  is not giving her oxycodone as per usual dosing. Pt occasionally yelling out and that she is allergic to vital signs machine and then falls back asleep.

## 2019-11-09 NOTE — H&P ADULT - NSHPPHYSICALEXAM_GEN_ALL_CORE
Vital Signs Last 24 Hrs  T(C): 36.4 (09 Nov 2019 02:59), Max: 36.4 (09 Nov 2019 02:59)  T(F): 97.6 (09 Nov 2019 02:59), Max: 97.6 (09 Nov 2019 02:59)  HR: 68 (09 Nov 2019 02:59) (68 - 68)  BP: 149/78 (09 Nov 2019 02:59) (149/78 - 149/78)  BP(mean): --  RR: 18 (09 Nov 2019 02:59) (18 - 18)  SpO2: 97% (09 Nov 2019 02:59) (97% - 97%) Vital Signs Last 24 Hrs  T(C): 36.4 (09 Nov 2019 02:59), Max: 36.4 (09 Nov 2019 02:59)  T(F): 97.6 (09 Nov 2019 02:59), Max: 97.6 (09 Nov 2019 02:59)  HR: 68 (09 Nov 2019 02:59) (68 - 68)  BP: 149/78 (09 Nov 2019 02:59) (149/78 - 149/78)  BP(mean): --  RR: 18 (09 Nov 2019 02:59) (18 - 18)  SpO2: 97% (09 Nov 2019 02:59) (97% - 97%)    · Physical Examination:   GENERAL: wells appearing, intermittently sleeping and then when woken up begins to yell at staff     HEAD: atraumatic   EYES: EOMI, pink conjunctiva     ENT: moist oral mucosa     CARDIAC: RRR, no edema, distal pulses present     RESPIRATORY: lungs CTAB, no increased work of breathing, nasal canula in place     GASTROINTESTINAL: no abdominal tenderness, no rebound or guarding, bowel sounds presents    GENITOURINARY: no CVA tenderness     MUSCULOSKELETAL: no deformity     NEUROLOGICAL: AAOx3, spontaneous movement of extremities     SKIN: intact Venous stasis    PSYCHIATRIC: cooperative, anxious    HEME LYMPH: no lymphadenopathy

## 2019-11-09 NOTE — H&P ADULT - PROBLEM SELECTOR PLAN 4
- Pt taking Amlodipine and Losartan at home    - c/w  Carvedilol and Losartan with parameters  - Monitor BP closely and adjust medications if clinically indicated.  - DASH diet. - p/w with worsening SOB   - PE: CTAB   - CXR : WNL  - WBC 16K  - O2 Sat 98 % on room air   - c/w Duoneb Q 6HR   - c/w Monteulokast  - Supportive care   - supplemental O2 if needed

## 2019-11-09 NOTE — ED PROVIDER NOTE - PROGRESS NOTE DETAILS
labs - labs - WBC 16 (similar to previous), trop negative   CXR - lungs clear  EKG - nsr, rate 64, inferior Q waves  Recent negative cath. Pt's anxiety markedly improved after xanax. No longer SOB. Will dc. Discussed indications for patient return to ED. Patient understood. James: Patient awaiting ambulette for dc. c/o of diffuse pain. anxious. Will give xanax and naproxen. Patient refusing to go home. Stating that she does not want to go home and would like rehab eval. will admit for further eval. PCP [facility] Johns Patient refusing to go home. Stating that she does not want to go home and would like rehab eval. will admit for further eval. PCP [facility] Johns. Admit to [facility] and being covered by [facility] this weekend.  [facility] also contacted and aware of admission.

## 2019-11-10 LAB
24R-OH-CALCIDIOL SERPL-MCNC: 21.2 NG/ML — LOW (ref 30–80)
ALBUMIN SERPL ELPH-MCNC: 2.5 G/DL — LOW (ref 3.5–5)
ALP SERPL-CCNC: 76 U/L — SIGNIFICANT CHANGE UP (ref 40–120)
ALT FLD-CCNC: 18 U/L DA — SIGNIFICANT CHANGE UP (ref 10–60)
ANION GAP SERPL CALC-SCNC: 4 MMOL/L — LOW (ref 5–17)
AST SERPL-CCNC: 10 U/L — SIGNIFICANT CHANGE UP (ref 10–40)
BASOPHILS # BLD AUTO: 0.01 K/UL — SIGNIFICANT CHANGE UP (ref 0–0.2)
BASOPHILS NFR BLD AUTO: 0.1 % — SIGNIFICANT CHANGE UP (ref 0–2)
BILIRUB DIRECT SERPL-MCNC: 0.3 MG/DL — HIGH (ref 0–0.2)
BILIRUB INDIRECT FLD-MCNC: 0.8 MG/DL — SIGNIFICANT CHANGE UP (ref 0.2–1)
BILIRUB SERPL-MCNC: 1.1 MG/DL — SIGNIFICANT CHANGE UP (ref 0.2–1.2)
BILIRUB SERPL-MCNC: 1.1 MG/DL — SIGNIFICANT CHANGE UP (ref 0.2–1.2)
BUN SERPL-MCNC: 8 MG/DL — SIGNIFICANT CHANGE UP (ref 7–18)
CALCIUM SERPL-MCNC: 9 MG/DL — SIGNIFICANT CHANGE UP (ref 8.4–10.5)
CHLORIDE SERPL-SCNC: 105 MMOL/L — SIGNIFICANT CHANGE UP (ref 96–108)
CHOLEST SERPL-MCNC: 145 MG/DL — SIGNIFICANT CHANGE UP (ref 10–199)
CO2 SERPL-SCNC: 34 MMOL/L — HIGH (ref 22–31)
CREAT SERPL-MCNC: 0.63 MG/DL — SIGNIFICANT CHANGE UP (ref 0.5–1.3)
CRP SERPL-MCNC: 1.86 MG/DL — HIGH (ref 0–0.4)
EOSINOPHIL # BLD AUTO: 0.13 K/UL — SIGNIFICANT CHANGE UP (ref 0–0.5)
EOSINOPHIL NFR BLD AUTO: 1.1 % — SIGNIFICANT CHANGE UP (ref 0–6)
ERYTHROCYTE [SEDIMENTATION RATE] IN BLOOD: 9 MM/HR — SIGNIFICANT CHANGE UP (ref 0–20)
FOLATE SERPL-MCNC: >20 NG/ML — SIGNIFICANT CHANGE UP
GLUCOSE SERPL-MCNC: 103 MG/DL — HIGH (ref 70–99)
HBA1C BLD-MCNC: 5.6 % — SIGNIFICANT CHANGE UP (ref 4–5.6)
HCT VFR BLD CALC: 38 % — SIGNIFICANT CHANGE UP (ref 34.5–45)
HDLC SERPL-MCNC: 59 MG/DL — SIGNIFICANT CHANGE UP
HGB BLD-MCNC: 11.4 G/DL — LOW (ref 11.5–15.5)
IMM GRANULOCYTES NFR BLD AUTO: 0.5 % — SIGNIFICANT CHANGE UP (ref 0–1.5)
LIPID PNL WITH DIRECT LDL SERPL: 64 MG/DL — SIGNIFICANT CHANGE UP
LYMPHOCYTES # BLD AUTO: 1.28 K/UL — SIGNIFICANT CHANGE UP (ref 1–3.3)
LYMPHOCYTES # BLD AUTO: 11.1 % — LOW (ref 13–44)
MAGNESIUM SERPL-MCNC: 2.3 MG/DL — SIGNIFICANT CHANGE UP (ref 1.6–2.6)
MCHC RBC-ENTMCNC: 27.3 PG — SIGNIFICANT CHANGE UP (ref 27–34)
MCHC RBC-ENTMCNC: 30 GM/DL — LOW (ref 32–36)
MCV RBC AUTO: 91.1 FL — SIGNIFICANT CHANGE UP (ref 80–100)
MONOCYTES # BLD AUTO: 0.87 K/UL — SIGNIFICANT CHANGE UP (ref 0–0.9)
MONOCYTES NFR BLD AUTO: 7.5 % — SIGNIFICANT CHANGE UP (ref 2–14)
NEUTROPHILS # BLD AUTO: 9.2 K/UL — HIGH (ref 1.8–7.4)
NEUTROPHILS NFR BLD AUTO: 79.7 % — HIGH (ref 43–77)
NRBC # BLD: 0 /100 WBCS — SIGNIFICANT CHANGE UP (ref 0–0)
PHOSPHATE SERPL-MCNC: 2.2 MG/DL — LOW (ref 2.5–4.5)
PLATELET # BLD AUTO: 145 K/UL — LOW (ref 150–400)
POTASSIUM SERPL-MCNC: 3.5 MMOL/L — SIGNIFICANT CHANGE UP (ref 3.5–5.3)
POTASSIUM SERPL-SCNC: 3.5 MMOL/L — SIGNIFICANT CHANGE UP (ref 3.5–5.3)
PROT SERPL-MCNC: 5.3 G/DL — LOW (ref 6–8.3)
RBC # BLD: 4.17 M/UL — SIGNIFICANT CHANGE UP (ref 3.8–5.2)
RBC # FLD: 16.9 % — HIGH (ref 10.3–14.5)
SODIUM SERPL-SCNC: 143 MMOL/L — SIGNIFICANT CHANGE UP (ref 135–145)
TOTAL CHOLESTEROL/HDL RATIO MEASUREMENT: 2.5 RATIO — LOW (ref 3.3–7.1)
TRIGL SERPL-MCNC: 112 MG/DL — SIGNIFICANT CHANGE UP (ref 10–149)
TSH SERPL-MCNC: 1.96 UU/ML — SIGNIFICANT CHANGE UP (ref 0.34–4.82)
VIT B12 SERPL-MCNC: 176 PG/ML — LOW (ref 232–1245)
WBC # BLD: 11.55 K/UL — HIGH (ref 3.8–10.5)
WBC # FLD AUTO: 11.55 K/UL — HIGH (ref 3.8–10.5)

## 2019-11-10 PROCEDURE — 99222 1ST HOSP IP/OBS MODERATE 55: CPT

## 2019-11-10 RX ORDER — OXYCODONE HYDROCHLORIDE 5 MG/1
5 TABLET ORAL EVERY 6 HOURS
Refills: 0 | Status: DISCONTINUED | OUTPATIENT
Start: 2019-11-10 | End: 2019-11-14

## 2019-11-10 RX ORDER — OXYCODONE HYDROCHLORIDE 5 MG/1
2.5 TABLET ORAL ONCE
Refills: 0 | Status: DISCONTINUED | OUTPATIENT
Start: 2019-11-10 | End: 2019-11-10

## 2019-11-10 RX ORDER — IPRATROPIUM/ALBUTEROL SULFATE 18-103MCG
3 AEROSOL WITH ADAPTER (GRAM) INHALATION EVERY 6 HOURS
Refills: 0 | Status: DISCONTINUED | OUTPATIENT
Start: 2019-11-10 | End: 2019-11-14

## 2019-11-10 RX ORDER — CITALOPRAM 10 MG/1
20 TABLET, FILM COATED ORAL DAILY
Refills: 0 | Status: DISCONTINUED | OUTPATIENT
Start: 2019-11-10 | End: 2019-11-12

## 2019-11-10 RX ORDER — LIDOCAINE 4 G/100G
1 CREAM TOPICAL DAILY
Refills: 0 | Status: DISCONTINUED | OUTPATIENT
Start: 2019-11-10 | End: 2019-11-14

## 2019-11-10 RX ORDER — ERGOCALCIFEROL 1.25 MG/1
50000 CAPSULE ORAL
Refills: 0 | Status: DISCONTINUED | OUTPATIENT
Start: 2019-11-10 | End: 2019-11-14

## 2019-11-10 RX ORDER — PREGABALIN 225 MG/1
1000 CAPSULE ORAL DAILY
Refills: 0 | Status: DISCONTINUED | OUTPATIENT
Start: 2019-11-10 | End: 2019-11-14

## 2019-11-10 RX ORDER — CLOPIDOGREL BISULFATE 75 MG/1
75 TABLET, FILM COATED ORAL DAILY
Refills: 0 | Status: DISCONTINUED | OUTPATIENT
Start: 2019-11-10 | End: 2019-11-14

## 2019-11-10 RX ORDER — HALOPERIDOL DECANOATE 100 MG/ML
0.5 INJECTION INTRAMUSCULAR EVERY 6 HOURS
Refills: 0 | Status: DISCONTINUED | OUTPATIENT
Start: 2019-11-10 | End: 2019-11-13

## 2019-11-10 RX ADMIN — GABAPENTIN 100 MILLIGRAM(S): 400 CAPSULE ORAL at 16:09

## 2019-11-10 RX ADMIN — CLOPIDOGREL BISULFATE 75 MILLIGRAM(S): 75 TABLET, FILM COATED ORAL at 18:50

## 2019-11-10 RX ADMIN — GABAPENTIN 100 MILLIGRAM(S): 400 CAPSULE ORAL at 21:47

## 2019-11-10 RX ADMIN — CITALOPRAM 20 MILLIGRAM(S): 10 TABLET, FILM COATED ORAL at 18:50

## 2019-11-10 RX ADMIN — Medication 1 MILLIGRAM(S): at 11:34

## 2019-11-10 RX ADMIN — GABAPENTIN 100 MILLIGRAM(S): 400 CAPSULE ORAL at 05:24

## 2019-11-10 RX ADMIN — Medication 3 MILLILITER(S): at 18:15

## 2019-11-10 RX ADMIN — MONTELUKAST 10 MILLIGRAM(S): 4 TABLET, CHEWABLE ORAL at 21:47

## 2019-11-10 RX ADMIN — OXYCODONE HYDROCHLORIDE 2.5 MILLIGRAM(S): 5 TABLET ORAL at 11:38

## 2019-11-10 RX ADMIN — Medication 3 MILLILITER(S): at 07:17

## 2019-11-10 RX ADMIN — NYSTATIN CREAM 1 APPLICATION(S): 100000 CREAM TOPICAL at 05:24

## 2019-11-10 RX ADMIN — Medication 1000 UNIT(S): at 11:35

## 2019-11-10 RX ADMIN — NYSTATIN CREAM 1 APPLICATION(S): 100000 CREAM TOPICAL at 17:17

## 2019-11-10 RX ADMIN — Medication 2 MILLIGRAM(S): at 17:17

## 2019-11-10 RX ADMIN — LOSARTAN POTASSIUM 100 MILLIGRAM(S): 100 TABLET, FILM COATED ORAL at 05:24

## 2019-11-10 RX ADMIN — OXYCODONE HYDROCHLORIDE 2.5 MILLIGRAM(S): 5 TABLET ORAL at 12:08

## 2019-11-10 RX ADMIN — AMLODIPINE BESYLATE 2.5 MILLIGRAM(S): 2.5 TABLET ORAL at 05:24

## 2019-11-10 RX ADMIN — Medication 3 MILLILITER(S): at 06:33

## 2019-11-10 RX ADMIN — OXYCODONE HYDROCHLORIDE 5 MILLIGRAM(S): 5 TABLET ORAL at 18:50

## 2019-11-10 RX ADMIN — Medication 125 MICROGRAM(S): at 05:24

## 2019-11-10 RX ADMIN — OXYCODONE HYDROCHLORIDE 5 MILLIGRAM(S): 5 TABLET ORAL at 19:20

## 2019-11-10 RX ADMIN — Medication 3 MILLILITER(S): at 20:30

## 2019-11-10 RX ADMIN — ATORVASTATIN CALCIUM 40 MILLIGRAM(S): 80 TABLET, FILM COATED ORAL at 21:47

## 2019-11-10 RX ADMIN — Medication 3 MILLILITER(S): at 14:16

## 2019-11-10 RX ADMIN — ENOXAPARIN SODIUM 40 MILLIGRAM(S): 100 INJECTION SUBCUTANEOUS at 11:36

## 2019-11-10 RX ADMIN — Medication 1 MILLIGRAM(S): at 05:24

## 2019-11-10 RX ADMIN — Medication 3 MILLIGRAM(S): at 21:47

## 2019-11-10 RX ADMIN — Medication 1 MILLIGRAM(S): at 16:10

## 2019-11-10 RX ADMIN — Medication 2 MILLIGRAM(S): at 05:24

## 2019-11-10 RX ADMIN — Medication 81 MILLIGRAM(S): at 11:38

## 2019-11-10 RX ADMIN — CITALOPRAM 10 MILLIGRAM(S): 10 TABLET, FILM COATED ORAL at 11:35

## 2019-11-10 NOTE — CHART NOTE - NSCHARTNOTEFT_GEN_A_CORE
EVENT:   Patient admitted with SOB, c/o pain in both lower extremities and cramps in calves. Bilateral LE  has redness and swelling.  OBJECTIVE:  Vital Signs Last 24 Hrs  T(C): 36.3 (10 Nov 2019 05:08), Max: 37 (09 Nov 2019 14:55)  T(F): 97.4 (10 Nov 2019 05:08), Max: 98.6 (09 Nov 2019 14:55)  HR: 55 (10 Nov 2019 05:08) (51 - 88)  BP: 142/54 (10 Nov 2019 05:08) (111/67 - 147/82)  BP(mean): --  RR: 17 (10 Nov 2019 05:08) (16 - 18)  SpO2: 100% (10 Nov 2019 05:08) (97% - 100%)    FOCUSED PHYSICAL EXAM:    LABS:                        12.5   16.42 )-----------( 221      ( 09 Nov 2019 05:36 )             40.7   CARDIAC MARKERS ( 09 Nov 2019 05:36 )  <0.015 ng/mL / x     / x     / x     / x        11-09    142  |  100  |  16  ----------------------------<  87  3.1<L>   |  35<H>  |  0.69    Ca    9.3      09 Nov 2019 05:36    TPro  6.2  /  Alb  3.2<L>  /  TBili  1.5<H>  /  DBili  x   /  AST  11  /  ALT  24  /  AlkPhos  87  11-09      EKG:   IMAGING:    ASSESSMENT:  HPI:  68 year old female from home lives with  (has HHA 8hrs/7days) with PMHx of Anxiety, HTN, HLD , NOEMY, asthma, COPD, DVT , Breast Cancer, Kidney cancer  in remission , Graves disease, Hypothyroid , S/P laminectomy , carpal tunnel syndrome, Obesity presents with vague complaint of SOB. Pt perseverating on idea that  is not giving her oxycodone as per usual dosing. Pt occasionally yelling out and that she is allergic to vital signs machine and then falls back asleep. pt has had multiple evaluations and admission for similar presentation recently with neg w/u. . pt denies chest pain , headache , dizziness, nausea , vomiting , diarrhea , abdominal pain , any change in bowel or urinary habits . Denies smoking, alcohol, illicit drug use.  allergic to shellfish , grapes , peaches .   	  In ED :   CXR : WNL   EKG : NSR    WBC 16K     Pt is Full code (09 Nov 2019 09:55)      PLAN: US Duplex Venous Low Ext. complete bilateral, urgent, portable.  Nasal cannula O2 2L.    FOLLOW UP / RESULT: EVENT:   Patient admitted with SOB, c/o pain in both lower extremities and cramps in calves. Bilateral LE  has redness and swelling.  OBJECTIVE:  Vital Signs Last 24 Hrs  T(C): 36.3 (10 Nov 2019 05:08), Max: 37 (09 Nov 2019 14:55)  T(F): 97.4 (10 Nov 2019 05:08), Max: 98.6 (09 Nov 2019 14:55)  HR: 55 (10 Nov 2019 05:08) (51 - 88)  BP: 142/54 (10 Nov 2019 05:08) (111/67 - 147/82)  BP(mean): --  RR: 17 (10 Nov 2019 05:08) (16 - 18)  SpO2: 100% (10 Nov 2019 05:08) (97% - 100%)    FOCUSED PHYSICAL EXAM:    LABS:                        12.5   16.42 )-----------( 221      ( 09 Nov 2019 05:36 )             40.7   CARDIAC MARKERS ( 09 Nov 2019 05:36 )  <0.015 ng/mL / x     / x     / x     / x        11-09    142  |  100  |  16  ----------------------------<  87  3.1<L>   |  35<H>  |  0.69    Ca    9.3      09 Nov 2019 05:36    TPro  6.2  /  Alb  3.2<L>  /  TBili  1.5<H>  /  DBili  x   /  AST  11  /  ALT  24  /  AlkPhos  87  11-09      EKG:   IMAGING:    ASSESSMENT:  HPI:  68 year old female from home lives with  (has HHA 8hrs/7days) with PMHx of Anxiety, HTN, HLD , NOEMY, asthma, COPD, DVT , Breast Cancer, Kidney cancer  in remission , Graves disease, Hypothyroid , S/P laminectomy , carpal tunnel syndrome, Obesity presents with vague complaint of SOB. Pt perseverating on idea that  is not giving her oxycodone as per usual dosing. Pt occasionally yelling out and that she is allergic to vital signs machine and then falls back asleep. pt has had multiple evaluations and admission for similar presentation recently with neg w/u. . pt denies chest pain , headache , dizziness, nausea , vomiting , diarrhea , abdominal pain , any change in bowel or urinary habits . Denies smoking, alcohol, illicit drug use.  allergic to shellfish , grapes , peaches .   	  In ED :   CXR : WNL   EKG : NSR    WBC 16K     Pt is Full code (09 Nov 2019 09:55)      PLAN: US Duplex Venous Lower Ext. Complete Bilateral, urgent, portable.  Nasal cannula O2 2L.    FOLLOW UP / RESULT:

## 2019-11-10 NOTE — CHART NOTE - NSCHARTNOTEFT_GEN_A_CORE
CC:  Sob    Patient is a 68y old  Female who presents with a chief complaint of SOB (10 Nov 2019 16:35). 68 year old female from home lives with  (has HHA 8hrs/7days) with PMHx of Anxiety, HTN, HLD , NOEMY, asthma, COPD, DVT , Breast Cancer, Kidney cancer  in remission , Graves disease, Hypothyroid , S/P laminectomy , carpal tunnel syndrome, Obesity presents with vague complaint of SOB. Pt perseverating on idea that  is not giving her oxycodone as per usual dosing. Pt occasionally yelling out and that she is allergic to vital signs machine and then falls back asleep. pt has had multiple evaluations and admission for similar presentation recently with neg w/u. . pt denies chest pain , headache , dizziness, nausea , vomiting , diarrhea , abdominal pain , any change in bowel or urinary habits . Denies smoking, alcohol, illicit drug use.  allergic to shellfish , grapes , peaches.      Pt had a RR earlier due to sob.  Pt's vitals - wnl.  Breathing treatment given. Pt with anxiety, depression- pt is on ativan and Celexa at home.  +history of chronic back pain - pt is on oxycodone 5mg po - 4 xa day.  Pt with recent vision loss - seen by neurology.         INTERVAL HPI/OVERNIGHT EVENTS: no new complaints    MEDICATIONS  (STANDING):  albuterol/ipratropium for Nebulization 3 milliLiter(s) Nebulizer every 6 hours  amLODIPine   Tablet 2.5 milliGRAM(s) Oral daily  aspirin  chewable 81 milliGRAM(s) Oral daily  atorvastatin 40 milliGRAM(s) Oral at bedtime  cholecalciferol 1000 Unit(s) Oral daily  citalopram 10 milliGRAM(s) Oral daily  clopidogrel Tablet 75 milliGRAM(s) Oral daily  cyanocobalamin Injectable 1000 MICROGram(s) SubCutaneous daily  enoxaparin Injectable 40 milliGRAM(s) SubCutaneous daily  ergocalciferol 55707 Unit(s) Oral <User Schedule>  folic acid 1 milliGRAM(s) Oral daily  gabapentin 100 milliGRAM(s) Oral three times a day  levothyroxine 125 MICROGram(s) Oral <User Schedule>  LORazepam   Injectable 1 milliGRAM(s) IV Push once  losartan 100 milliGRAM(s) Oral daily  melatonin 3 milliGRAM(s) Oral at bedtime  montelukast 10 milliGRAM(s) Oral at bedtime  nystatin Powder 1 Application(s) Topical every 12 hours  trihexyphenidyl 2 milliGRAM(s) Oral two times a day    MEDICATIONS  (PRN):  albuterol/ipratropium for Nebulization 3 milliLiter(s) Nebulizer every 6 hours PRN Shortness of Breath and/or Wheezing  haloperidol    Injectable 0.5 milliGRAM(s) IntraMuscular every 6 hours PRN Agitation  LORazepam     Tablet 1 milliGRAM(s) Oral every 6 hours PRN Anxiety  oxyCODONE    IR 5 milliGRAM(s) Oral every 6 hours PRN Moderate Pain (4 - 6)      __________________________________________________  REVIEW OF SYSTEMS:    CONSTITUTIONAL: No fever  RESPIRATORY: + sob  CARDIOVASCULAR: + chest pain no palpitations  GASTROINTESTINAL: No pain. No nausea or vomiting; No diarrhea   NEUROLOGICAL: No headache or numbness, no tremors  MUSCULOSKELETAL: + back pain  GENITOURINARY: no dysuria, no frequency, no hesitancy  PSYCHIATRY: + anxiety, + depression        Vital Signs Last 24 Hrs  T(C): 37.4 (10 Nov 2019 14:28), Max: 37.4 (10 Nov 2019 14:28)  T(F): 99.4 (10 Nov 2019 14:28), Max: 99.4 (10 Nov 2019 14:28)  HR: 100 (10 Nov 2019 16:21) (51 - 100)  BP: 138/73 (10 Nov 2019 14:28) (129/92 - 142/54)  BP(mean): --  RR: 16 (10 Nov 2019 14:28) (16 - 18)  SpO2: 99% (10 Nov 2019 16:21) (96% - 100%)    ________________________________________________  PHYSICAL EXAM:  GENERAL: NAD  CHEST/LUNG: diminished throughout, + occ wheeze.   HEART: S1 S2  regular; no murmurs, gallops or rubs  ABDOMEN: Soft, Nontender, Nondistended; Bowel sounds present  EXTREMITIES: no cyanosis; no edema; no calf tenderness  NERVOUS SYSTEM:  Awake and alert; Oriented  to place, person and time anxious   MUSCULOSKELETAL: + decreased rom due pain  _________________________________________________  LABS:                        11.4   11.55 )-----------( 145      ( 10 Nov 2019 06:40 )             38.0     11-10    143  |  105  |  8   ----------------------------<  103<H>  3.5   |  34<H>  |  0.63    Ca    9.0      10 Nov 2019 06:40  Phos  2.2     11-10  Mg     2.3     11-10    TPro  5.3<L>  /  Alb  2.5<L>  /  TBili  1.1  /  DBili  0.3<H>  /  AST  10  /  ALT  18  /  AlkPhos  76  11-10        CAPILLARY BLOOD GLUCOSE            RADIOLOGY & ADDITIONAL TESTS:  < from: CT Angio Chest w/ IV Cont (09.30.19 @ 18:44) >        INTERPRETATION:  CLINICAL INFORMATION: Shortness of breath, numbness.     COMPARISON: Pulmonary CT angiogram from 4/30/2019.    PROCEDURE:   CT Angiography of the Chest.  90 ml of Omnipaque 350 was injected intravenously. 10 ml were discarded.  Sagittal and coronal reformats were performed as well as 3D (MIP)   reconstructions.      FINDINGS:    LUNGS AND AIRWAYS: Patent central airways.  Emphysema.Bilateral lower   lobe linear atelectasis.    PLEURA: No pleural effusion.    MEDIASTINUM AND MIYA: No lymphadenopathy.    VESSELS: Good opacification the pulmonary arterial tree. No pulmonary   embolism. Aortic atherosclerotic calcifications.    HEART: Cardiomegaly. No pericardial effusion.    CHEST WALL AND LOWER NECK: Within normal limits.    VISUALIZED UPPER ABDOMEN: Left renal cyst.    BONES: Acute appearing right anterior third rib fracture. Additional   bilateral subacute to chronic rib fractures. Spinal degenerative changes.   Upper thoracic spinal fusion hardware.    IMPRESSION:     No pulmonary embolism.    Acute appearing right anterior third rib fracture. Multiple bilateral   subacute to chronic rib fractures.    Emphysema    < end of copied text >      A/P  1.  Pt with acute right anterior rib fracture  - continue oxycodone po prn  - lidocaine patch    2. Anxiety/depression  - follow up full consult from Psychiatry  - continue ativan  - increase celexa to 20mg po daily    3.  Possible right frontal ischemic infarct with left hemiparesis  - MRI as per dr. Diaz  - start asa,plavix  -ativan prior to mri as per dr. diaz

## 2019-11-10 NOTE — CONSULT NOTE ADULT - SUBJECTIVE AND OBJECTIVE BOX
Patient is a 68y old  Female who presents with a chief complaint of SOB (10 Nov 2019 12:19)      HPI: Admitted for chest tightness cramps. She reports sudden loss of vision in the right eye followed by loss of ability to use the left hand - the fingers curling uselessly. This occurred 3 weeks ago and she was advised to go to a neurosurgeon where she is scheduled to be in December    PAST MEDICAL & SURGICAL HISTORY:  Asthma  Sleep apnea  Obesity  DVT (deep venous thrombosis): history of- not presently on A/C  Hypothyroid  COPD (chronic obstructive pulmonary disease)  Breast cancer in situ, left  Kidney cancer, primary, with metastasis from kidney to other site, left: LEft sided- s/p nephrectomy only- no chemo or RT  Graves disease  Anxiety  Hyperlipidemia  Hypertension  S/P laminectomy: now bed and wheelchair ridden with severe pain  History of parathyroidectomy  History of carpal tunnel release: right wrist  History of eye surgery: 7 surgeries secondary to grave&#x27;s disease  History of pelvic surgery: Pelvic Sling  S/P breast biopsy: left  S/p nephrectomy: left  S/P cholecystectomy      FAMILY HISTORY:  Family history of thyroid cancer (Child): daughter has thyroid cancer  Family history of heart disease (Sibling): brother has a PPM  Family history of diabetes mellitus (Sibling): siblings  Family history of hypertension: mother and father  Family history of myocardial infarction: mother  at age 67 and father at age 67 of MI&#x27;s        Social Hx:  Nonsmoker, no drug or alcohol use    MEDICATIONS  (STANDING):  albuterol/ipratropium for Nebulization 3 milliLiter(s) Nebulizer every 6 hours  amLODIPine   Tablet 2.5 milliGRAM(s) Oral daily  aspirin  chewable 81 milliGRAM(s) Oral daily  atorvastatin 40 milliGRAM(s) Oral at bedtime  cholecalciferol 1000 Unit(s) Oral daily  citalopram 10 milliGRAM(s) Oral daily  cyanocobalamin Injectable 1000 MICROGram(s) SubCutaneous daily  enoxaparin Injectable 40 milliGRAM(s) SubCutaneous daily  ergocalciferol 93367 Unit(s) Oral <User Schedule>  folic acid 1 milliGRAM(s) Oral daily  gabapentin 100 milliGRAM(s) Oral three times a day  levothyroxine 125 MICROGram(s) Oral <User Schedule>  losartan 100 milliGRAM(s) Oral daily  melatonin 3 milliGRAM(s) Oral at bedtime  montelukast 10 milliGRAM(s) Oral at bedtime  nystatin Powder 1 Application(s) Topical every 12 hours  trihexyphenidyl 2 milliGRAM(s) Oral two times a day       Allergies    Grapes (Hives)  No Known Drug Allergies  Peaches (Hives)  shellfish (Hives)    Intolerances        ROS: Pertinent positives in HPI, all other ROS were reviewed and are negative.      Vital Signs Last 24 Hrs  T(C): 37.4 (10 Nov 2019 14:28), Max: 37.4 (10 Nov 2019 14:28)  T(F): 99.4 (10 Nov 2019 14:28), Max: 99.4 (10 Nov 2019 14:28)  HR: 100 (10 Nov 2019 16:21) (51 - 100)  BP: 138/73 (10 Nov 2019 14:28) (129/92 - 142/54)  BP(mean): --  RR: 16 (10 Nov 2019 14:28) (16 - 18)  SpO2: 99% (10 Nov 2019 16:21) (96% - 100%)        Constitutional: awake and alert.  HEENT: PERRLA, EOMI,   Neck: Supple.  Respiratory: Breath sounds are clear bilaterally  Cardiovascular: S1 and S2, regular / irregular rhythm  Gastrointestinal: soft, nontender  Extremities:  + edema  Vascular: Carotid Bruit - no  Musculoskeletal: no joint swelling/tenderness, no abnormal movements  Skin: No rashes    Neurological exam:  HF: A x O x 3. Appropriately interactive, normal affect. Speech fluent, No Aphasia or paraphasic errors. Naming /repetition intact   CN: MARCELO, EOMI, VFF, facial sensation normal, left NLFD, tongue midline, Palate moves equally, SCM equal bilaterally  Motor: left pronator drift, Strength 1-2/5 in Left wrist fingers normal left LE and right side extremities ext, normal bulk and tone, no tremor, rigidity or bradykinesia.    Sens: Intact to light touch / PP/ VS/ JS    Reflexes: Asymmetric and brisker left biceps brachioradialis  Coord:  No FNFA, dysmetria, SENHA intact   Gait/Balance: Cannot test    NIHSS: 3  MRS 2          Labs:                        11.4   11.55 )-----------( 145      ( 10 Nov 2019 06:40 )             38.0     11-10    143  |  105  |  8   ----------------------------<  103<H>  3.5   |  34<H>  |  0.63    Ca    9.0      10 Nov 2019 06:40  Phos  2.2     11-10  Mg     2.3     11-10    TPro  5.3<L>  /  Alb  2.5<L>  /  TBili  1.1  /  DBili  0.3<H>  /  AST  10  /  ALT  18  /  AlkPhos  76  11-10    11-10 AdyeutzituY8T 5.6    11-10 Chol 145 LDL 64 HDL 59 Trig 112      Radiology report:  - CT head

## 2019-11-10 NOTE — PROGRESS NOTE ADULT - SUBJECTIVE AND OBJECTIVE BOX
COVERING MEDICAL ATTENDING    CHIEF COMPLAINT:Patient is a 68y old  Female who presents with a chief complaint of SOB .Pt feeling better.    	  REVIEW OF SYSTEMS:  CONSTITUTIONAL: No fever, weight loss, or fatigue  EYES: No eye pain, visual disturbances, or discharge  ENT:  No difficulty hearing, tinnitus, vertigo; No sinus or throat pain  NECK: No pain or stiffness  RESPIRATORY: No cough, wheezing, chills or hemoptysis; + Shortness of Breath  CARDIOVASCULAR: No chest pain, palpitations, passing out, dizziness, or leg swelling  GASTROINTESTINAL: No abdominal or epigastric pain. No nausea, vomiting, or hematemesis; No diarrhea or constipation. No melena or hematochezia.  GENITOURINARY: No dysuria, frequency, hematuria, or incontinence  NEUROLOGICAL: No headaches, memory loss, loss of strength, numbness, or tremors  SKIN: No itching, burning, rashes, or lesions   LYMPH Nodes: No enlarged glands  ENDOCRINE: No heat or cold intolerance; No hair loss  MUSCULOSKELETAL: No joint pain or swelling; No muscle, back, or extremity pain  PSYCHIATRIC: No depression, anxiety, mood swings, or difficulty sleeping  HEME/LYMPH: No easy bruising, or bleeding gums  ALLERGY AND IMMUNOLOGIC: No hives or eczema	      PHYSICAL EXAM:  T(C): 36.7 (11-10-19 @ 07:10), Max: 37 (11-09-19 @ 14:55)  HR: 100 (11-10-19 @ 08:29) (51 - 100)  BP: 129/92 (11-10-19 @ 07:10) (111/67 - 142/54)  RR: 18 (11-10-19 @ 07:10) (16 - 18)  SpO2: 96% (11-10-19 @ 08:29) (96% - 100%)  Wt(kg): --  I&O's Summary      Appearance: Normal	  HEENT:   Normal oral mucosa, PERRL, EOMI	  Lymphatic: No lymphadenopathy  Cardiovascular: Normal S1 S2, No JVD, No murmurs, No edema  Respiratory: B/L ronchi  Psychiatry: A & O x 3, Mood & affect appropriate  Gastrointestinal:  Soft, Non-tender, + BS	  Skin: No rashes, No ecchymoses, No cyanosis	  Neurologic: Non-focal  Extremities: Normal range of motion, No clubbing, cyanosis or edema  Vascular: Peripheral pulses palpable 2+ bilaterally    MEDICATIONS  (STANDING):  albuterol/ipratropium for Nebulization 3 milliLiter(s) Nebulizer every 6 hours  amLODIPine   Tablet 2.5 milliGRAM(s) Oral daily  aspirin  chewable 81 milliGRAM(s) Oral daily  atorvastatin 40 milliGRAM(s) Oral at bedtime  cholecalciferol 1000 Unit(s) Oral daily  citalopram 10 milliGRAM(s) Oral daily  cyanocobalamin Injectable 1000 MICROGram(s) SubCutaneous daily  enoxaparin Injectable 40 milliGRAM(s) SubCutaneous daily  ergocalciferol 03780 Unit(s) Oral every week  folic acid 1 milliGRAM(s) Oral daily  gabapentin 100 milliGRAM(s) Oral three times a day  levothyroxine 125 MICROGram(s) Oral <User Schedule>  losartan 100 milliGRAM(s) Oral daily  melatonin 3 milliGRAM(s) Oral at bedtime  montelukast 10 milliGRAM(s) Oral at bedtime  nystatin Powder 1 Application(s) Topical every 12 hours  trihexyphenidyl 2 milliGRAM(s) Oral two times a day      	  LABS:	 	      CARDIAC MARKERS ( 09 Nov 2019 05:36 )  <0.015 ng/mL / x     / x     / x     / x                            11.4   11.55 )-----------( 145      ( 10 Nov 2019 06:40 )             38.0     11-10    143  |  105  |  8   ----------------------------<  103<H>  3.5   |  34<H>  |  0.63    Ca    9.0      10 Nov 2019 06:40  Phos  2.2     11-10  Mg     2.3     11-10    TPro  5.3<L>  /  Alb  2.5<L>  /  TBili  1.1  /  DBili  0.3<H>  /  AST  10  /  ALT  18  /  AlkPhos  76  11-10    proBNP: Serum Pro-Brain Natriuretic Peptide: 168 pg/mL (11-09 @ 05:36)    Lipid Profile: Cholesterol 145  LDL 64  HDL 59      HgA1c:   TSH: Thyroid Stimulating Hormone, Serum: 1.96 uU/mL (11-10 @ 06:40)      	  Vitamin B12, Serum: 176 pg/mL (11.10.19 @ 11:32)    Vitamin D, 25-Hydroxy: 21.2: VITD Interpretive Data Result:  30.0-80.0 ng/mL Optimal levels (Reference Range)  >80.0 ng/mL Toxicity possible  20.0-29.0 ng/mL Insufficiency  10.0-19.0 ng/mL Mild to Moderate Deficiency  <10.0 ng/mL Severe Deficiency  Optimal levels for 25-Hydroxy vitamin D are 30.0 ng/mL and above based  upon the Endocrine Society guidelines 2011.  However, there is a lack of  consensus on this and the [place] [employer] recommends 20.0 ng/mL and  above as optimal levels.  Vitamin D results may vary depending on the  method of analysis.  The Roche alvaro e801 electrochemiluminescent  immunoassay method measures both D2 and D3. ng/mL (11.10.19 @ 11:32)    EXAM:  XR CHEST PORTABLE URGENT 1V                            PROCEDURE DATE:  11/09/2019          INTERPRETATION:  CLINICAL INFORMATION: Shortness of breath.    A single portable view of the chest was obtained.     Comparison: Chest x-ray and chest CT from 9/30/2019 chest CT.     The mediastinal cardiac silhouette is unremarkable.    The lungs are clear.     No acute osseous finding. Old bilateral rib fractures. Orthopedic   cervical hardware.    IMPRESSION:    No acute process.

## 2019-11-10 NOTE — CHART NOTE - NSCHARTNOTEFT_GEN_A_CORE
RRT called by RN for pt is screaming and very agitated. Pt was seen and examined at bedside, pt is very anxious, but she spoke full sentences and requested to get her duoneb. Physical exam no significant finding, b/l mild wheezing, Duoneb STAT given, pt stop screaming and calm down now. Pt has hx of anxiety, started on haldol prn and f/u with psych consult. RRT cancelled, not a real rapid.

## 2019-11-10 NOTE — ADVANCED PRACTICE NURSE CONSULT - RECOMMEDATIONS
-Clean all affected areas with warm water, mild soap, pat dry, and apply skin prep to the surrounding skin  -Frequent toileting  -Apply Antifungal Moisture Barrier Cream to the Bilateral Gluteus, Perianal, and Bilateral Post Thighs b.i.d. PRN  -Elevate/float the patients heels using heel protectors and reposition the patient Q 2hrs using wedges or pillows

## 2019-11-10 NOTE — CONSULT NOTE ADULT - ASSESSMENT
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

## 2019-11-10 NOTE — ADVANCED PRACTICE NURSE CONSULT - ASSESSMENT
This is a 68yr old female patient admitted for Shortness of Breath, presenting with the following:  -There is evidence of Bilateral Lower and Upper Extremity edema and ecchymosis  -There is a Stage 1 Pressure Injury to the Coccyx and Bilateral Heels, as evident by non-blanchable erythema  -There is evidence of Incontinence Associated Dermatitis to the Bilateral Gluteus, Perianal, and Bilateral Post Thighs

## 2019-11-10 NOTE — PROVIDER CONTACT NOTE (OTHER) - SITUATION
Patient c/o of sob, o2sat 97% administered  Duoneb as per orders. Post duoneb patient 02 sat remains 97%. Pt requests additional duoneb. DEMARCO Boles notified. Pt became agitated, DEMARCO Boles notified

## 2019-11-11 ENCOUNTER — TRANSCRIPTION ENCOUNTER (OUTPATIENT)
Age: 69
End: 2019-11-11

## 2019-11-11 DIAGNOSIS — H53.9 UNSPECIFIED VISUAL DISTURBANCE: ICD-10-CM

## 2019-11-11 LAB
ANION GAP SERPL CALC-SCNC: 5 MMOL/L — SIGNIFICANT CHANGE UP (ref 5–17)
BUN SERPL-MCNC: 8 MG/DL — SIGNIFICANT CHANGE UP (ref 7–18)
CALCIUM SERPL-MCNC: 8.9 MG/DL — SIGNIFICANT CHANGE UP (ref 8.4–10.5)
CHLORIDE SERPL-SCNC: 104 MMOL/L — SIGNIFICANT CHANGE UP (ref 96–108)
CO2 SERPL-SCNC: 33 MMOL/L — HIGH (ref 22–31)
CREAT SERPL-MCNC: 0.81 MG/DL — SIGNIFICANT CHANGE UP (ref 0.5–1.3)
GLUCOSE SERPL-MCNC: 91 MG/DL — SIGNIFICANT CHANGE UP (ref 70–99)
HCT VFR BLD CALC: 35.3 % — SIGNIFICANT CHANGE UP (ref 34.5–45)
HGB BLD-MCNC: 10.6 G/DL — LOW (ref 11.5–15.5)
MCHC RBC-ENTMCNC: 27.5 PG — SIGNIFICANT CHANGE UP (ref 27–34)
MCHC RBC-ENTMCNC: 30 GM/DL — LOW (ref 32–36)
MCV RBC AUTO: 91.5 FL — SIGNIFICANT CHANGE UP (ref 80–100)
NRBC # BLD: 0 /100 WBCS — SIGNIFICANT CHANGE UP (ref 0–0)
PLATELET # BLD AUTO: 151 K/UL — SIGNIFICANT CHANGE UP (ref 150–400)
POTASSIUM SERPL-MCNC: 3.7 MMOL/L — SIGNIFICANT CHANGE UP (ref 3.5–5.3)
POTASSIUM SERPL-SCNC: 3.7 MMOL/L — SIGNIFICANT CHANGE UP (ref 3.5–5.3)
RBC # BLD: 3.86 M/UL — SIGNIFICANT CHANGE UP (ref 3.8–5.2)
RBC # FLD: 17.1 % — HIGH (ref 10.3–14.5)
SODIUM SERPL-SCNC: 142 MMOL/L — SIGNIFICANT CHANGE UP (ref 135–145)
WBC # BLD: 11.38 K/UL — HIGH (ref 3.8–10.5)
WBC # FLD AUTO: 11.38 K/UL — HIGH (ref 3.8–10.5)

## 2019-11-11 PROCEDURE — 93880 EXTRACRANIAL BILAT STUDY: CPT | Mod: 26

## 2019-11-11 PROCEDURE — 93970 EXTREMITY STUDY: CPT | Mod: 26

## 2019-11-11 PROCEDURE — 70551 MRI BRAIN STEM W/O DYE: CPT | Mod: 26

## 2019-11-11 PROCEDURE — 70544 MR ANGIOGRAPHY HEAD W/O DYE: CPT | Mod: 26,59

## 2019-11-11 RX ORDER — MONTELUKAST 4 MG/1
10 TABLET, CHEWABLE ORAL AT BEDTIME
Refills: 0 | Status: DISCONTINUED | OUTPATIENT
Start: 2019-11-11 | End: 2019-11-14

## 2019-11-11 RX ORDER — IPRATROPIUM/ALBUTEROL SULFATE 18-103MCG
3 AEROSOL WITH ADAPTER (GRAM) INHALATION ONCE
Refills: 0 | Status: COMPLETED | OUTPATIENT
Start: 2019-11-11 | End: 2019-11-11

## 2019-11-11 RX ORDER — FLUTICASONE PROPIONATE 50 MCG
1 SPRAY, SUSPENSION NASAL
Refills: 0 | Status: DISCONTINUED | OUTPATIENT
Start: 2019-11-11 | End: 2019-11-14

## 2019-11-11 RX ADMIN — Medication 3 MILLILITER(S): at 21:00

## 2019-11-11 RX ADMIN — OXYCODONE HYDROCHLORIDE 5 MILLIGRAM(S): 5 TABLET ORAL at 02:15

## 2019-11-11 RX ADMIN — Medication 40 MILLIGRAM(S): at 16:15

## 2019-11-11 RX ADMIN — CLOPIDOGREL BISULFATE 75 MILLIGRAM(S): 75 TABLET, FILM COATED ORAL at 12:51

## 2019-11-11 RX ADMIN — Medication 1 MILLIGRAM(S): at 12:52

## 2019-11-11 RX ADMIN — NYSTATIN CREAM 1 APPLICATION(S): 100000 CREAM TOPICAL at 06:15

## 2019-11-11 RX ADMIN — Medication 81 MILLIGRAM(S): at 12:51

## 2019-11-11 RX ADMIN — Medication 3 MILLILITER(S): at 14:48

## 2019-11-11 RX ADMIN — GABAPENTIN 100 MILLIGRAM(S): 400 CAPSULE ORAL at 06:15

## 2019-11-11 RX ADMIN — ATORVASTATIN CALCIUM 40 MILLIGRAM(S): 80 TABLET, FILM COATED ORAL at 20:59

## 2019-11-11 RX ADMIN — Medication 1 MILLIGRAM(S): at 16:15

## 2019-11-11 RX ADMIN — LIDOCAINE 1 PATCH: 4 CREAM TOPICAL at 19:33

## 2019-11-11 RX ADMIN — GABAPENTIN 100 MILLIGRAM(S): 400 CAPSULE ORAL at 21:00

## 2019-11-11 RX ADMIN — GABAPENTIN 100 MILLIGRAM(S): 400 CAPSULE ORAL at 14:26

## 2019-11-11 RX ADMIN — OXYCODONE HYDROCHLORIDE 5 MILLIGRAM(S): 5 TABLET ORAL at 01:27

## 2019-11-11 RX ADMIN — OXYCODONE HYDROCHLORIDE 5 MILLIGRAM(S): 5 TABLET ORAL at 21:00

## 2019-11-11 RX ADMIN — Medication 1000 UNIT(S): at 12:51

## 2019-11-11 RX ADMIN — NYSTATIN CREAM 1 APPLICATION(S): 100000 CREAM TOPICAL at 17:27

## 2019-11-11 RX ADMIN — Medication 3 MILLIGRAM(S): at 21:00

## 2019-11-11 RX ADMIN — OXYCODONE HYDROCHLORIDE 5 MILLIGRAM(S): 5 TABLET ORAL at 14:57

## 2019-11-11 RX ADMIN — LIDOCAINE 1 PATCH: 4 CREAM TOPICAL at 12:52

## 2019-11-11 RX ADMIN — MONTELUKAST 10 MILLIGRAM(S): 4 TABLET, CHEWABLE ORAL at 21:00

## 2019-11-11 RX ADMIN — AMLODIPINE BESYLATE 2.5 MILLIGRAM(S): 2.5 TABLET ORAL at 06:15

## 2019-11-11 RX ADMIN — Medication 1 MILLIGRAM(S): at 02:27

## 2019-11-11 RX ADMIN — OXYCODONE HYDROCHLORIDE 5 MILLIGRAM(S): 5 TABLET ORAL at 22:00

## 2019-11-11 RX ADMIN — LOSARTAN POTASSIUM 100 MILLIGRAM(S): 100 TABLET, FILM COATED ORAL at 06:15

## 2019-11-11 RX ADMIN — Medication 3 MILLILITER(S): at 07:09

## 2019-11-11 RX ADMIN — Medication 1 MILLIGRAM(S): at 09:26

## 2019-11-11 RX ADMIN — OXYCODONE HYDROCHLORIDE 5 MILLIGRAM(S): 5 TABLET ORAL at 14:27

## 2019-11-11 RX ADMIN — Medication 3 MILLILITER(S): at 01:08

## 2019-11-11 RX ADMIN — Medication 2 MILLIGRAM(S): at 06:15

## 2019-11-11 RX ADMIN — CITALOPRAM 20 MILLIGRAM(S): 10 TABLET, FILM COATED ORAL at 14:26

## 2019-11-11 RX ADMIN — Medication 125 MICROGRAM(S): at 06:15

## 2019-11-11 RX ADMIN — Medication 2 MILLIGRAM(S): at 17:26

## 2019-11-11 NOTE — PROGRESS NOTE ADULT - SUBJECTIVE AND OBJECTIVE BOX
Patient is a 68y old  Female who presents with a chief complaint of SOB (11 Nov 2019 10:08)      INTERVAL HPI/OVERNIGHT EVENTS:      VITAL SIGNS:  T(F): 98.4 (11-11-19 @ 05:34)  HR: 79 (11-11-19 @ 05:34)  BP: 142/73 (11-11-19 @ 05:34)  RR: 18 (11-11-19 @ 05:34)  SpO2: 100% (11-11-19 @ 05:34)  Wt(kg): --  I&O's Detail          REVIEW OF SYSTEMS:    CONSTITUTIONAL:  No fevers, chills, sweats    HEENT:  Eyes:  No diplopia or blurred vision. ENT:  No earache, sore throat or runny nose.    CARDIOVASCULAR:  No pressure, squeezing, tightness, or heaviness about the chest; no palpitations.    RESPIRATORY:  Per HPI    GASTROINTESTINAL:  No abdominal pain, nausea, vomiting or diarrhea.    GENITOURINARY:  No dysuria, frequency or urgency.    NEUROLOGIC:  No paresthesias, fasciculations, seizures or weakness.    PSYCHIATRIC:  No disorder of thought or mood.      PHYSICAL EXAM:    Constitutional:no complaints  ENMT:atnc  Neck:supple  Respiratory:clear  Cardiovascular:rr  Gastrointestinal:soft  Extremities:no edema  Vascular:intact  Neurological:non focal  Musculoskeletal:no edema, normal rom    MEDICATIONS  (STANDING):  albuterol/ipratropium for Nebulization 3 milliLiter(s) Nebulizer every 6 hours  amLODIPine   Tablet 2.5 milliGRAM(s) Oral daily  aspirin  chewable 81 milliGRAM(s) Oral daily  atorvastatin 40 milliGRAM(s) Oral at bedtime  cholecalciferol 1000 Unit(s) Oral daily  citalopram 20 milliGRAM(s) Oral daily  clopidogrel Tablet 75 milliGRAM(s) Oral daily  cyanocobalamin Injectable 1000 MICROGram(s) SubCutaneous daily  enoxaparin Injectable 40 milliGRAM(s) SubCutaneous daily  ergocalciferol 80003 Unit(s) Oral <User Schedule>  folic acid 1 milliGRAM(s) Oral daily  gabapentin 100 milliGRAM(s) Oral three times a day  levothyroxine 125 MICROGram(s) Oral <User Schedule>  lidocaine   Patch 1 Patch Transdermal daily  losartan 100 milliGRAM(s) Oral daily  melatonin 3 milliGRAM(s) Oral at bedtime  montelukast 10 milliGRAM(s) Oral at bedtime  nystatin Powder 1 Application(s) Topical every 12 hours  predniSONE   Tablet 40 milliGRAM(s) Oral daily  trihexyphenidyl 2 milliGRAM(s) Oral two times a day    MEDICATIONS  (PRN):  albuterol/ipratropium for Nebulization 3 milliLiter(s) Nebulizer every 6 hours PRN Shortness of Breath and/or Wheezing  haloperidol    Injectable 0.5 milliGRAM(s) IntraMuscular every 6 hours PRN Agitation  LORazepam     Tablet 1 milliGRAM(s) Oral every 6 hours PRN Anxiety  oxyCODONE    IR 5 milliGRAM(s) Oral every 6 hours PRN Moderate Pain (4 - 6)      Allergies    Grapes (Hives)  No Known Drug Allergies  Peaches (Hives)  shellfish (Hives)    Intolerances        LABS:                        10.6   11.38 )-----------( 151      ( 11 Nov 2019 07:03 )             35.3     11-11    142  |  104  |  8   ----------------------------<  91  3.7   |  33<H>  |  0.81    Ca    8.9      11 Nov 2019 07:03  Phos  2.2     11-10  Mg     2.3     11-10    TPro  5.3<L>  /  Alb  2.5<L>  /  TBili  1.1  /  DBili  0.3<H>  /  AST  10  /  ALT  18  /  AlkPhos  76  11-10              CAPILLARY BLOOD GLUCOSE        pro-bnp 168 11-09 @ 05:36     d-dimer --  11-09 @ 05:36      RADIOLOGY & ADDITIONAL TESTS:    CXR:  EXAM:  XR CHEST PORTABLE URGENT 1V                            PROCEDURE DATE:  11/09/2019          INTERPRETATION:  CLINICAL INFORMATION: Shortness of breath.    A single portable view of the chest was obtained.     Comparison: Chest x-ray and chest CT from 9/30/2019 chest CT.     The mediastinal cardiac silhouette is unremarkable.    The lungs are clear.     No acute osseous finding. Old bilateral rib fractures. Orthopedic   cervical hardware.    IMPRESSION:    No acute process.      EXAM:  MR BRAIN                            PROCEDURE DATE:  11/11/2019          INTERPRETATION:  INDICATION:    TIA. Stroke.  TECHNIQUE:  Multiplanar brain imaging was conducted using a 1.5 Marlen   magnet.  T1, T2 and FLAIR imaging was performed.  In addition, diffusion   imaging, diffusion coefficient assessment (ADC) and echo planar T2* was   incorporated. No contrast administered    COMPARISON EXAMINATION:  MR dated 5/15/2017.    FINDINGS:  HEMISPHERES:  No diffusion restriction or acute ischemia is noted. No   space-occupying lesion is identified. There are mild involutional changes   commensurate with age, seen within the white matter. No microhemorrhage   or mass is noted.  VENTRICLES:  midline and normal in size  POSTERIOR FOSSA:  The brain stem and cerebellum are unremarkable in   appearance.  No CP angle abnormality is noted.  EXTRA-AXIAL:  No mass or collections are depicted.  CRANIAL NERVES:  No abnormality noted.  VASCULATURE:  The major vessels and venous sinuses are grossly patent.    SINUSES AND MASTOIDS:  Opacification is noted of the frontal sinuses,   along with ethmoid thickening right greater than left. Also there is a   bilateral mastoid partial opacification right greater than left.  MISCELLANEOUS:  A scalp lipoma is noted in the right the frontal   supraorbital region.    IMPRESSION:      1)  unremarkable MRI study of the brain. No diffusion restriction or MR   evidence of acute ischemia. No space-occupying lesion noted..  2)  chronic inflammatory changes noted in the sinuses and in the mastoid   cells, right greater than left..         EXAM:  MR ANGIO BRAIN                            PROCEDURE DATE:  11/11/2019          INTERPRETATION:  INDICATION:  TIA.    TECHNIQUE:  MR angiography of the brain was performed using three   dimensional time-of-flight (3D-TOF) technique.  Imaging was performed on   a 1.5 Marlen magnet.    FINDINGS:      INTERNAL CAROTID ARTERIES:  Bilaterally patent.  No intraluminal filling   defect or dissection seen.    [place] OF [place]:  No aneurysm identified.    CEREBRAL ARTERIES:  Bilaterally patent, best assessed on the axial images.    VERTEBROBASILAR SYSTEM:  No stenosis or occlusion depicted.      IMPRESSION:     Unremarkable study.

## 2019-11-11 NOTE — DISCHARGE NOTE PROVIDER - PROVIDER TOKENS
FREE:[LAST:[JUDITH medical provider],PHONE:[(   )    -],FAX:[(   )    -],FOLLOWUP:[1-3 days]] FREE:[LAST:[JUDITH medical provider],PHONE:[(   )    -],FAX:[(   )    -],FOLLOWUP:[1-3 days]],PROVIDER:[TOKEN:[[id number]],FOLLOWUP:[1-3 days]]

## 2019-11-11 NOTE — PROGRESS NOTE ADULT - SUBJECTIVE AND OBJECTIVE BOX
NP Note discussed with  primary attending    Patient is a 68y old  Female who presents with a chief complaint of SOB (11 Nov 2019 06:49)    68 year old female from home (has HHA 8hrs/7days) with PMHx of Anxiety, HTN, HLD , NOEMY, asthma, COPD, DVT , Breast Cancer, Kidney cancer  in remission , Graves disease, Hypothyroid , presented with complaint of SOB. Admitted for concern of COPD exacerb vs anxiety   Also pt reported  sudden loss of vision in the right eye and loss. Neurology consulted, recommended further workup with MRI brain   to evaluate for  ischemic infarct. Pt seen at bedside, appears very anxious, co persistent difficulties breathing despite duoneb treatment and oxygen.         INTERVAL HPI/OVERNIGHT EVENTS: no new complaints    MEDICATIONS  (STANDING):  albuterol/ipratropium for Nebulization 3 milliLiter(s) Nebulizer every 6 hours  amLODIPine   Tablet 2.5 milliGRAM(s) Oral daily  aspirin  chewable 81 milliGRAM(s) Oral daily  atorvastatin 40 milliGRAM(s) Oral at bedtime  cholecalciferol 1000 Unit(s) Oral daily  citalopram 20 milliGRAM(s) Oral daily  clopidogrel Tablet 75 milliGRAM(s) Oral daily  cyanocobalamin Injectable 1000 MICROGram(s) SubCutaneous daily  enoxaparin Injectable 40 milliGRAM(s) SubCutaneous daily  ergocalciferol 86097 Unit(s) Oral <User Schedule>  folic acid 1 milliGRAM(s) Oral daily  gabapentin 100 milliGRAM(s) Oral three times a day  levothyroxine 125 MICROGram(s) Oral <User Schedule>  lidocaine   Patch 1 Patch Transdermal daily  losartan 100 milliGRAM(s) Oral daily  melatonin 3 milliGRAM(s) Oral at bedtime  montelukast 10 milliGRAM(s) Oral at bedtime  nystatin Powder 1 Application(s) Topical every 12 hours  trihexyphenidyl 2 milliGRAM(s) Oral two times a day    MEDICATIONS  (PRN):  albuterol/ipratropium for Nebulization 3 milliLiter(s) Nebulizer every 6 hours PRN Shortness of Breath and/or Wheezing  haloperidol    Injectable 0.5 milliGRAM(s) IntraMuscular every 6 hours PRN Agitation  LORazepam     Tablet 1 milliGRAM(s) Oral every 6 hours PRN Anxiety  oxyCODONE    IR 5 milliGRAM(s) Oral every 6 hours PRN Moderate Pain (4 - 6)      __________________________________________________  REVIEW OF SYSTEMS:    CONSTITUTIONAL: No fever,   EYES: no acute visual disturbances  NECK: No pain or stiffness  RESPIRATORY: No cough;+ shortness of breath  CARDIOVASCULAR: intermittent  chest pain, no palpitations  GASTROINTESTINAL: No pain. No nausea or vomiting; No diarrhea   NEUROLOGICAL: + vision changes to left eye, right hand finger contractures with weakness. no headache or numbness, no tremors  MUSCULOSKELETAL: No joint pain, no muscle pain  GENITOURINARY: no dysuria, no frequency, no hesitancy  PSYCHIATRY: no depression , ++ anxiety  ALL OTHER  ROS negative        Vital Signs Last 24 Hrs  T(C): 36.9 (11 Nov 2019 05:34), Max: 37.4 (10 Nov 2019 14:28)  T(F): 98.4 (11 Nov 2019 05:34), Max: 99.4 (10 Nov 2019 14:28)  HR: 79 (11 Nov 2019 05:34) (79 - 100)  BP: 142/73 (11 Nov 2019 05:34) (138/73 - 144/54)  BP(mean): --  RR: 18 (11 Nov 2019 05:34) (16 - 18)  SpO2: 100% (11 Nov 2019 05:34) (97% - 100%)    ________________________________________________  PHYSICAL EXAM:  GENERAL: obese, appears anxious   HEENT: Normocephalic;  conjunctivae and sclerae clear; moist mucous membranes;   NECK : supple  CHEST/LUNG: scattered wheezing mau   HEART: S1 S2  regular; no murmurs, gallops or rubs  ABDOMEN: Soft, Nontender, Nondistended; Bowel sounds present  EXTREMITIES: no cyanosis; no edema; no calf tenderness  SKIN: warm and dry; no rash  NERVOUS SYSTEM:  Awake and alert; Oriented  to place, person and time ; right hand fingers curled in     _________________________________________________  LABS:                        10.6   11.38 )-----------( 151      ( 11 Nov 2019 07:03 )             35.3     11-11    142  |  104  |  8   ----------------------------<  91  3.7   |  33<H>  |  0.81    Ca    8.9      11 Nov 2019 07:03  Phos  2.2     11-10  Mg     2.3     11-10    TPro  5.3<L>  /  Alb  2.5<L>  /  TBili  1.1  /  DBili  0.3<H>  /  AST  10  /  ALT  18  /  AlkPhos  76  11-10        CAPILLARY BLOOD GLUCOSE            RADIOLOGY & ADDITIONAL TESTS:    Imaging Personally Reviewed:  YES/NO    Consultant(s) Notes Reviewed:   YES/ No    Care Discussed with Consultants :     Plan of care was discussed with patient and /or primary care giver; all questions and concerns were addressed and care was aligned with patient's wishes.

## 2019-11-11 NOTE — DISCHARGE NOTE PROVIDER - CARE PROVIDER_API CALL
JUDITH medical provider,   Phone: (   )    -  Fax: (   )    -  Follow Up Time: 1-3 days JUDITH medical provider,   Phone: (   )    -  Fax: (   )    -  Follow Up Time: 1-3 days    Neal Larry)  Critical Care Medicine; Internal Medicine; Pulmonary Disease  [id number] [place] [address] [address]  [place] [address]  Phone: [phone]  Fax: [phone]  Follow Up Time: 1-3 days

## 2019-11-11 NOTE — DISCHARGE NOTE PROVIDER - NSDCCPCAREPLAN_GEN_ALL_CORE_FT
PRINCIPAL DISCHARGE DIAGNOSIS  Diagnosis: SOB (shortness of breath)  Assessment and Plan of Treatment: You were admitted with shortness of breath.  Your chest x-ray was clear and you had no signs of infection.  You were treated with broncodilators, steroids and oxygen.  You were noted to have shortness of breath particularly when you got anxious.  Your anti anxiety medications were adjusted by psych.  You should have less frequent episodes of shortness of breath with decreased anxiety.        SECONDARY DISCHARGE DIAGNOSES  Diagnosis: Hand weakness  Assessment and Plan of Treatment: Your left hand weakness my be due to cervical spine disease which was seen on MRI.  Please follow up with neurologist Dr. Parada next week at [address] [place] [address] for further testings.    Diagnosis: Vision loss  Assessment and Plan of Treatment: You reported vision loss in your right eye.  You were evaluated by neurologist and were recommended to undergo temporal artery biopsy which you declined.    Follow up with your doctor and with ophthalmologist.    Diagnosis: Anxiety disorder, unspecified type  Assessment and Plan of Treatment: Your home anti anxiety medications were no longer sufficient to decrease your anxiety.  Your medications were adjusted by psych   Please follow up with your psychiatrist to continue assessing and adjusting new medications.    Diagnosis: Paranoia  Assessment and Plan of Treatment: You were seen by psych for reported increased paranoia and increased anxiety.  Your medications have been adjusted.  Please follow up with your primary doctor and your psychiatrist. PRINCIPAL DISCHARGE DIAGNOSIS  Diagnosis: SOB (shortness of breath)  Assessment and Plan of Treatment: You were admitted with shortness of breath.  Your chest x-ray was clear and you had no signs of infection.  You were treated with broncodilators, steroids and oxygen.  You were noted to have shortness of breath particularly when you got anxious.  Your anti anxiety medications were adjusted by psych.  You should have less frequent episodes of shortness of breath with decreased anxiety.        SECONDARY DISCHARGE DIAGNOSES  Diagnosis: Hand weakness  Assessment and Plan of Treatment: Your left hand weakness my be due to cervical spine disease which was seen on MRI.  Please follow up with neurologist Dr. Parada next week at [address] [place] [address] for further testings.    Diagnosis: Vision loss  Assessment and Plan of Treatment: You reported vision loss in your right eye.  You were evaluated by neurologist and were recommended to undergo temporal artery biopsy which you declined.    Follow up with your doctor and with ophthalmologist.    Diagnosis: Anxiety disorder, unspecified type  Assessment and Plan of Treatment: Your home anti anxiety medications were no longer sufficient to decrease your anxiety.  Your medications were adjusted by psych   Please follow up with your psychiatrist to continue assessing and adjusting new medications.    Diagnosis: Hypothyroid  Assessment and Plan of Treatment: Take your medication as prescribed and follow up with your primary doctor.    Diagnosis: Paranoia  Assessment and Plan of Treatment: You were seen by psych for reported increased paranoia and increased anxiety.  Your medications have been adjusted.  Please follow up with your primary doctor and your psychiatrist.

## 2019-11-11 NOTE — PROGRESS NOTE ADULT - PROBLEM SELECTOR PLAN 2
left vision loss with left hand weakness   c/w aspirin 81mg clopidogrel 75 mg  MRI head and MRA head w/o contrast  Carotid doppler  Dr Diaz neuro on board

## 2019-11-11 NOTE — DISCHARGE NOTE PROVIDER - NSDCFUSCHEDAPPT_GEN_ALL_CORE_FT
SHEKHAR VASQUEZ ; 12/03/2019 ; [facility] [address] SHEKHAR VASQUEZ ; 12/03/2019 ; [facility] [address] SHEKHAR VASQUEZ ; 12/03/2019 ; [facility] [address] SHEKHAR VASQUEZ ; 12/03/2019 ; [facility] [address] SHEKHAR VASQUEZ ; 12/03/2019 ; [facility] [address] SHEKHAR VASQUEZ ; 12/03/2019 ; [facility] [address]

## 2019-11-11 NOTE — DISCHARGE NOTE PROVIDER - NSFOLLOWUPCLINICS_GEN_ALL_ED_FT
[facility]  Gastroenterology  [address] [place] [address]  Phone: [phone]  Fax: [phone]  Follow Up Time: 1 week [facility]  Gastroenterology  [address] [place] [address]  Phone: [phone]  Fax: [phone]  Follow Up Time: 1 week

## 2019-11-11 NOTE — DISCHARGE NOTE PROVIDER - HOSPITAL COURSE
68 year old female from home lives with  (has HHA 8hrs/7days) with PMHx of Anxiety, HTN, HLD , NOEMY, asthma, COPD, DVT , Breast Cancer, Kidney cancer  in remission , Graves disease, Hypothyroid , S/P laminectomy , carpal tunnel syndrome, Obesity presents with vague complaint of SOB. Pt is admitted for management of SOB. According to past chart notes patient is known to go to 1-2 ERs per month for shortness of breath but it is usually related to panic attacks. In ED,  CXR : WNL, WBC 16K 68 year old female from home lives with  (has HHA 8hrs/7days) with PMHx of Anxiety, HTN, HLD , NOEMY, asthma, COPD, DVT , Breast Cancer, Kidney cancer  in remission , Graves disease, Hypothyroid , S/P laminectomy , carpal tunnel syndrome, Obesity presents with vague complaint of SOB. Pt is admitted for management of SOB. According to past chart notes patient is known to go to 1-2 ERs per month for shortness of breath but it is usually related to panic attacks. In ED,  CXR : WNL, WBC 16K        CXR 11/9/19->No acute process.    MRA Brain 11/11/19->unremarkable study    US DPLX B/L LEs 11/11/19->No femoropopliteal deep venous thrombosis in either lower extremity. However, the calf veins were not well-visualized due to large subcutaneous edema.     US B/L Carotids 11/11/19->No hemodynamically significant carotid artery stenoses. Mild to moderate calcified plaque as described above.    MR Brain 11/11-19->    1)  unremarkable MRI study of the brain. No diffusion restriction or MR     evidence of acute ischemia. No space-occupying lesion noted..    2)  chronic inflammatory changes noted in the sinuses and in the mastoid     cells, right greater than left..         Pt. was admitted to medicine for further evaluation of SOB/Anxiety, treated with bronchodilation, steroids and supplemental O2, noted anxiety preceding episodes of SOB.    Pt. followed by psych, anti anxiety meds adjusted, pt. to f/u as OP.    Pt. with transient right eye visual loss, followed [place] Dr. Parada, was recommended to undergo temporal artery biopsy to R/O temporal arteritis, however pt. declined at this time.    Pt. c/o left distal hand weakness concerning for cervical disease, cord leison or entrapment neuropathy, recommended to f/u as OP with Dr. Parada.    Pt. evaluated by PT, recommended JUDITH 68 year old female from home lives with  (has HHA 8hrs/7days) with PMHx of Anxiety, HTN, HLD , NOEMY, asthma, COPD, DVT , Breast Cancer, Kidney cancer  in remission , Graves disease, Hypothyroid , S/P laminectomy , carpal tunnel syndrome, Obesity presents with vague complaint of SOB. Pt is admitted for management of SOB. According to past chart notes patient is known to go to 1-2 ERs per month for shortness of breath but it is usually related to panic attacks. In ED,  CXR : WNL, WBC 16K        CXR 11/9/19->No acute process.    MRA Brain 11/11/19->unremarkable study    US DPLX B/L LEs 11/11/19->No femoropopliteal deep venous thrombosis in either lower extremity. However, the calf veins were not well-visualized due to large subcutaneous edema.     US B/L Carotids 11/11/19->No hemodynamically significant carotid artery stenoses. Mild to moderate calcified plaque as described above.    MR Brain 11/11-19->    1)  unremarkable MRI study of the brain. No diffusion restriction or MR     evidence of acute ischemia. No space-occupying lesion noted..    2)  chronic inflammatory changes noted in the sinuses and in the mastoid     cells, right greater than left..         Pt. was admitted to medicine for further evaluation of SOB/Anxiety, treated with bronchodilation, steroids and supplemental O2, noted anxiety preceding episodes of SOB.    Pt. followed by psych, anti anxiety meds adjusted, pt. to f/u as OP.    Pt. with transient right eye visual loss, followed [place] Dr. Parada, was recommended to undergo temporal artery biopsy to R/O temporal arteritis, however pt. declined at this time.    Pt. c/o left distal hand weakness concerning for cervical disease, cord leison or entrapment neuropathy, recommended to f/u as OP with Dr. Parada.    Pt. evaluated by PT, recommended JUDITH.    Pt. is medically stable for discharge.

## 2019-11-11 NOTE — DISCHARGE NOTE PROVIDER - NSDCMRMEDTOKEN_GEN_ALL_CORE_FT
albuterol 2.5 mg/3 mL (0.083%) inhalation solution: 3 milliliter(s) inhaled every 6 hours, As Needed  AMLODIPINE BESYLATE 2.5 MG TAB:   aspirin 81 mg oral tablet, chewable: 1 tab(s) orally once a day  Ativan 1 mg oral tablet: 1 tab(s) orally every 6 hours  atorvastatin 40 mg oral tablet: 1 tab(s) orally once a day (at bedtime)  cholecalciferol 2000 intl units oral tablet: 1 tab(s) orally once a day  citalopram 10 mg oral tablet: 1 tab(s) orally once a day  folic acid 1 mg oral tablet: 1 tab(s) orally once a day  gabapentin 100 mg oral capsule: 1 cap(s) orally 3 times a day  LEVOTHYROXINE 125 MCG TABLET:   LORazepam 1 mg oral tablet: 1 tab(s) orally every 6 hours MDD:4 tabelts/day  losartan 100 mg oral tablet: 1 tab(s) orally once a day    montelukast 10 mg oral tablet: 1 tab(s) orally once a day (at bedtime)  trihexyphenidyl 2 mg oral tablet: 1 tab(s) orally 2 times a day  Ventolin HFA 90 mcg/inh inhalation aerosol: 2 puff(s) inhaled 4 times a day, As Needed amLODIPine 2.5 mg oral tablet: 1 tab(s) orally once a day  aspirin 81 mg oral tablet, chewable: 1 tab(s) orally once a day  atorvastatin 40 mg oral tablet: 1 tab(s) orally once a day (at bedtime)  baclofen 5 mg oral tablet: 1 tab(s) orally 3 times a day  cholecalciferol 2000 intl units oral tablet: 1 tab(s) orally once a day  cholecalciferol oral tablet: 1000 unit(s) orally once a day  citalopram 10 mg oral tablet: 3 tab(s) orally once a day  clopidogrel 75 mg oral tablet: 1 tab(s) orally once a day  fluticasone 50 mcg/inh nasal spray: 1 spray(s) nasal 2 times a day  folic acid 1 mg oral tablet: 1 tab(s) orally once a day  gabapentin 100 mg oral capsule: 1 cap(s) orally 3 times a day  ipratropium-albuterol 0.5 mg-2.5 mg/3 mLinhalation solution: 3 milliliter(s) inhaled every 6 hours  levothyroxine 125 mcg (0.125 mg) oral tablet: 1 tab(s) orally   lidocaine 5% topical film: Apply topically to affected area once a day  LORazepam 0.5 mg oral tablet: 1 tab(s) orally every 6 hours, As needed, Agitation  losartan 100 mg oral tablet: 1 tab(s) orally once a day  lurasidone 40 mg oral tablet: 1 tab(s) orally once a day  melatonin 3 mg oral tablet: 1 tab(s) orally once a day (at bedtime)  menthol-benzocaine 3.6 mg-15 mg mucous membrane lozenge: 1 dose(s) mucous membrane 3 times a day  montelukast 10 mg oral tablet: 1 tab(s) orally once a day (at bedtime)  OLANZapine 2.5 mg oral tablet: 1 tab(s) orally 2 times a day, As needed, Psychosis  pantoprazole 40 mg oral delayed release tablet: 1 tab(s) orally once a day  trihexyphenidyl 2 mg oral tablet: 1 tab(s) orally 2 times a day

## 2019-11-12 DIAGNOSIS — F22 DELUSIONAL DISORDERS: ICD-10-CM

## 2019-11-12 DIAGNOSIS — R29.898 OTHER SYMPTOMS AND SIGNS INVOLVING THE MUSCULOSKELETAL SYSTEM: ICD-10-CM

## 2019-11-12 DIAGNOSIS — H54.7 UNSPECIFIED VISUAL LOSS: ICD-10-CM

## 2019-11-12 LAB
ANION GAP SERPL CALC-SCNC: 6 MMOL/L — SIGNIFICANT CHANGE UP (ref 5–17)
BUN SERPL-MCNC: 10 MG/DL — SIGNIFICANT CHANGE UP (ref 7–18)
CALCIUM SERPL-MCNC: 9.1 MG/DL — SIGNIFICANT CHANGE UP (ref 8.4–10.5)
CHLORIDE SERPL-SCNC: 107 MMOL/L — SIGNIFICANT CHANGE UP (ref 96–108)
CO2 SERPL-SCNC: 30 MMOL/L — SIGNIFICANT CHANGE UP (ref 22–31)
CREAT SERPL-MCNC: 0.72 MG/DL — SIGNIFICANT CHANGE UP (ref 0.5–1.3)
GLUCOSE SERPL-MCNC: 110 MG/DL — HIGH (ref 70–99)
HCT VFR BLD CALC: 35.1 % — SIGNIFICANT CHANGE UP (ref 34.5–45)
HGB BLD-MCNC: 10.7 G/DL — LOW (ref 11.5–15.5)
MANUAL SMEAR VERIFICATION: SIGNIFICANT CHANGE UP
MCHC RBC-ENTMCNC: 27.9 PG — SIGNIFICANT CHANGE UP (ref 27–34)
MCHC RBC-ENTMCNC: 30.5 GM/DL — LOW (ref 32–36)
MCV RBC AUTO: 91.4 FL — SIGNIFICANT CHANGE UP (ref 80–100)
NRBC # BLD: 0 /100 WBCS — SIGNIFICANT CHANGE UP (ref 0–0)
PLAT MORPH BLD: NORMAL — SIGNIFICANT CHANGE UP
PLATELET # BLD AUTO: 133 K/UL — LOW (ref 150–400)
PLATELET COUNT - ESTIMATE: ABNORMAL
POIKILOCYTOSIS BLD QL AUTO: SLIGHT — SIGNIFICANT CHANGE UP
POTASSIUM SERPL-MCNC: 4 MMOL/L — SIGNIFICANT CHANGE UP (ref 3.5–5.3)
POTASSIUM SERPL-SCNC: 4 MMOL/L — SIGNIFICANT CHANGE UP (ref 3.5–5.3)
RBC # BLD: 3.84 M/UL — SIGNIFICANT CHANGE UP (ref 3.8–5.2)
RBC # FLD: 16.8 % — HIGH (ref 10.3–14.5)
RBC BLD AUTO: ABNORMAL
SCHISTOCYTES BLD QL AUTO: SLIGHT — SIGNIFICANT CHANGE UP
SODIUM SERPL-SCNC: 143 MMOL/L — SIGNIFICANT CHANGE UP (ref 135–145)
WBC # BLD: 11.93 K/UL — HIGH (ref 3.8–10.5)
WBC # FLD AUTO: 11.93 K/UL — HIGH (ref 3.8–10.5)

## 2019-11-12 PROCEDURE — 99232 SBSQ HOSP IP/OBS MODERATE 35: CPT

## 2019-11-12 PROCEDURE — 99223 1ST HOSP IP/OBS HIGH 75: CPT

## 2019-11-12 RX ORDER — AMLODIPINE BESYLATE 2.5 MG/1
2.5 TABLET ORAL ONCE
Refills: 0 | Status: COMPLETED | OUTPATIENT
Start: 2019-11-12 | End: 2019-11-12

## 2019-11-12 RX ORDER — BACLOFEN 100 %
5 POWDER (GRAM) MISCELLANEOUS THREE TIMES A DAY
Refills: 0 | Status: DISCONTINUED | OUTPATIENT
Start: 2019-11-12 | End: 2019-11-14

## 2019-11-12 RX ORDER — BENZOCAINE AND MENTHOL 5; 1 G/100ML; G/100ML
1 LIQUID ORAL
Refills: 0 | Status: DISCONTINUED | OUTPATIENT
Start: 2019-11-12 | End: 2019-11-14

## 2019-11-12 RX ORDER — LURASIDONE HYDROCHLORIDE 40 MG/1
20 TABLET ORAL DAILY
Refills: 0 | Status: DISCONTINUED | OUTPATIENT
Start: 2019-11-12 | End: 2019-11-14

## 2019-11-12 RX ORDER — CITALOPRAM 10 MG/1
30 TABLET, FILM COATED ORAL DAILY
Refills: 0 | Status: DISCONTINUED | OUTPATIENT
Start: 2019-11-12 | End: 2019-11-14

## 2019-11-12 RX ADMIN — OXYCODONE HYDROCHLORIDE 5 MILLIGRAM(S): 5 TABLET ORAL at 22:25

## 2019-11-12 RX ADMIN — Medication 40 MILLIGRAM(S): at 06:58

## 2019-11-12 RX ADMIN — BENZOCAINE AND MENTHOL 1 LOZENGE: 5; 1 LIQUID ORAL at 18:21

## 2019-11-12 RX ADMIN — Medication 1 MILLIGRAM(S): at 12:09

## 2019-11-12 RX ADMIN — LIDOCAINE 1 PATCH: 4 CREAM TOPICAL at 01:30

## 2019-11-12 RX ADMIN — LIDOCAINE 1 PATCH: 4 CREAM TOPICAL at 19:30

## 2019-11-12 RX ADMIN — Medication 125 MICROGRAM(S): at 06:58

## 2019-11-12 RX ADMIN — LIDOCAINE 1 PATCH: 4 CREAM TOPICAL at 12:09

## 2019-11-12 RX ADMIN — GABAPENTIN 100 MILLIGRAM(S): 400 CAPSULE ORAL at 13:13

## 2019-11-12 RX ADMIN — OXYCODONE HYDROCHLORIDE 5 MILLIGRAM(S): 5 TABLET ORAL at 03:26

## 2019-11-12 RX ADMIN — Medication 3 MILLILITER(S): at 14:44

## 2019-11-12 RX ADMIN — OXYCODONE HYDROCHLORIDE 5 MILLIGRAM(S): 5 TABLET ORAL at 15:13

## 2019-11-12 RX ADMIN — Medication 1 MILLIGRAM(S): at 00:04

## 2019-11-12 RX ADMIN — OXYCODONE HYDROCHLORIDE 5 MILLIGRAM(S): 5 TABLET ORAL at 21:55

## 2019-11-12 RX ADMIN — Medication 1 MILLIGRAM(S): at 10:03

## 2019-11-12 RX ADMIN — Medication 3 MILLIGRAM(S): at 21:55

## 2019-11-12 RX ADMIN — GABAPENTIN 100 MILLIGRAM(S): 400 CAPSULE ORAL at 06:58

## 2019-11-12 RX ADMIN — AMLODIPINE BESYLATE 2.5 MILLIGRAM(S): 2.5 TABLET ORAL at 21:54

## 2019-11-12 RX ADMIN — LOSARTAN POTASSIUM 100 MILLIGRAM(S): 100 TABLET, FILM COATED ORAL at 06:58

## 2019-11-12 RX ADMIN — OXYCODONE HYDROCHLORIDE 5 MILLIGRAM(S): 5 TABLET ORAL at 04:20

## 2019-11-12 RX ADMIN — Medication 0.5 MILLIGRAM(S): at 19:59

## 2019-11-12 RX ADMIN — Medication 3 MILLILITER(S): at 20:44

## 2019-11-12 RX ADMIN — Medication 3 MILLILITER(S): at 02:44

## 2019-11-12 RX ADMIN — Medication 3 MILLILITER(S): at 09:19

## 2019-11-12 RX ADMIN — GABAPENTIN 100 MILLIGRAM(S): 400 CAPSULE ORAL at 21:55

## 2019-11-12 RX ADMIN — CLOPIDOGREL BISULFATE 75 MILLIGRAM(S): 75 TABLET, FILM COATED ORAL at 12:08

## 2019-11-12 RX ADMIN — AMLODIPINE BESYLATE 2.5 MILLIGRAM(S): 2.5 TABLET ORAL at 06:58

## 2019-11-12 RX ADMIN — Medication 2 MILLIGRAM(S): at 17:04

## 2019-11-12 RX ADMIN — Medication 81 MILLIGRAM(S): at 12:07

## 2019-11-12 RX ADMIN — Medication 2 MILLIGRAM(S): at 06:58

## 2019-11-12 RX ADMIN — BENZOCAINE AND MENTHOL 1 LOZENGE: 5; 1 LIQUID ORAL at 12:16

## 2019-11-12 RX ADMIN — Medication 3 MILLILITER(S): at 00:03

## 2019-11-12 RX ADMIN — MONTELUKAST 10 MILLIGRAM(S): 4 TABLET, CHEWABLE ORAL at 21:54

## 2019-11-12 RX ADMIN — CITALOPRAM 20 MILLIGRAM(S): 10 TABLET, FILM COATED ORAL at 12:09

## 2019-11-12 RX ADMIN — BENZOCAINE AND MENTHOL 1 LOZENGE: 5; 1 LIQUID ORAL at 23:26

## 2019-11-12 RX ADMIN — ATORVASTATIN CALCIUM 40 MILLIGRAM(S): 80 TABLET, FILM COATED ORAL at 21:55

## 2019-11-12 RX ADMIN — OXYCODONE HYDROCHLORIDE 5 MILLIGRAM(S): 5 TABLET ORAL at 16:10

## 2019-11-12 RX ADMIN — ENOXAPARIN SODIUM 40 MILLIGRAM(S): 100 INJECTION SUBCUTANEOUS at 12:14

## 2019-11-12 RX ADMIN — Medication 1000 UNIT(S): at 12:08
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
## 2019-11-12 NOTE — BEHAVIORAL HEALTH ASSESSMENT NOTE - COMMENTS ON SUICIDE RISK/PROTECTIVE FACTORS:
Patient not evidencing desire or intent to harm self and despite chronic paranoia, has not engaged in any acts to harm herself or others whom she perceives are threatening her.

## 2019-11-12 NOTE — BEHAVIORAL HEALTH ASSESSMENT NOTE - ACTIVATING EVENTS/STRESSORS
Acute medical problem/Change in provider or treatment (i.e., medications, psychotherapy, milieu)/Non-compliant or not receiving treatment

## 2019-11-12 NOTE — PROGRESS NOTE ADULT - PROBLEM SELECTOR PLAN 5
c/w Amlodipine and Losartan   monitor blood pressure
c/w lexapro   c/w Ativan as needed   c/w Vinicio  Psych consulted Dr. Walton.

## 2019-11-12 NOTE — PROGRESS NOTE ADULT - SUBJECTIVE AND OBJECTIVE BOX
NP Note discussed with  Primary Attending    Patient is a 68y old  Female who presents with a chief complaint of SOB (12 Nov 2019 10:30)      INTERVAL HPI/OVERNIGHT EVENTS: no new complaints    MEDICATIONS  (STANDING):  albuterol/ipratropium for Nebulization 3 milliLiter(s) Nebulizer every 6 hours  amLODIPine   Tablet 2.5 milliGRAM(s) Oral daily  aspirin  chewable 81 milliGRAM(s) Oral daily  atorvastatin 40 milliGRAM(s) Oral at bedtime  baclofen 5 milliGRAM(s) Oral three times a day  benzocaine 15 mG/menthol 3.6 mG Lozenge 1 Lozenge Oral four times a day  cholecalciferol 1000 Unit(s) Oral daily  citalopram 20 milliGRAM(s) Oral daily  clopidogrel Tablet 75 milliGRAM(s) Oral daily  cyanocobalamin Injectable 1000 MICROGram(s) SubCutaneous daily  enoxaparin Injectable 40 milliGRAM(s) SubCutaneous daily  ergocalciferol 22767 Unit(s) Oral <User Schedule>  fluticasone propionate 50 MICROgram(s)/spray Nasal Spray 1 Spray(s) Both Nostrils two times a day  folic acid 1 milliGRAM(s) Oral daily  gabapentin 100 milliGRAM(s) Oral three times a day  levothyroxine 125 MICROGram(s) Oral <User Schedule>  lidocaine   Patch 1 Patch Transdermal daily  losartan 100 milliGRAM(s) Oral daily  melatonin 3 milliGRAM(s) Oral at bedtime  montelukast 10 milliGRAM(s) Oral at bedtime  predniSONE   Tablet 40 milliGRAM(s) Oral daily  trihexyphenidyl 2 milliGRAM(s) Oral two times a day    MEDICATIONS  (PRN):  haloperidol    Injectable 0.5 milliGRAM(s) IntraMuscular every 6 hours PRN Agitation  LORazepam     Tablet 1 milliGRAM(s) Oral every 6 hours PRN Anxiety  oxyCODONE    IR 5 milliGRAM(s) Oral every 6 hours PRN Moderate Pain (4 - 6)      __________________________________________________  REVIEW OF SYSTEMS:    CONSTITUTIONAL: No fever,   EYES: no acute visual disturbances  NECK: No pain or stiffness  RESPIRATORY: No cough; No shortness of breath  CARDIOVASCULAR: No chest pain, no palpitations  GASTROINTESTINAL: No pain. No nausea or vomiting; No diarrhea   NEUROLOGICAL: No headache or numbness, no tremors  MUSCULOSKELETAL: No joint pain, no muscle pain  GENITOURINARY: no dysuria, no frequency, no hesitancy  PSYCHIATRY: no depression , no anxiety  ALL OTHER  ROS negative        Vital Signs Last 24 Hrs  T(C): 36.7 (12 Nov 2019 05:09), Max: 36.8 (11 Nov 2019 20:27)  T(F): 98.1 (12 Nov 2019 05:09), Max: 98.2 (11 Nov 2019 20:27)  HR: 60 (12 Nov 2019 09:37) (55 - 83)  BP: 152/52 (12 Nov 2019 05:09) (116/57 - 152/52)  BP(mean): --  RR: 16 (12 Nov 2019 05:09) (16 - 18)  SpO2: 97% (12 Nov 2019 09:37) (95% - 100%)    ________________________________________________  PHYSICAL EXAM:  anxious, cries intermittently, fearful  GENERAL: NAD  HEENT: Normocephalic;  conjunctivae and sclerae clear; moist mucous membranes;   NECK : supple  CHEST/LUNG: Scattered rhonchi B/L, no wheezing, on O2 N/C 2L/min  HEART: S1 S2  regular; no murmurs, gallops or rubs  ABDOMEN: Soft, Nontender, Nondistended; Bowel sounds present  EXTREMITIES: LEs slightly discolored from old cellulitis, edema 1+, no cyanosis;  no calf tenderness  SKIN: warm and dry; no rash  NERVOUS SYSTEM:  Awake and alert; Oriented  to place, person and time ; no new deficits    _________________________________________________  LABS:                        10.7   11.93 )-----------( 133      ( 12 Nov 2019 07:17 )             35.1     11-12    143  |  107  |  10  ----------------------------<  110<H>  4.0   |  30  |  0.72    Ca    9.1      12 Nov 2019 07:17          CAPILLARY BLOOD GLUCOSE    RADIOLOGY & ADDITIONAL TESTS:      Xray Chest 1 View- PORTABLE-Urgent (11.09.19 @ 06:09) >  EXAM:  XR CHEST PORTABLE URGENT 1V                            PROCEDURE DATE:  11/09/2019          INTERPRETATION:  CLINICAL INFORMATION: Shortness of breath.    A single portable view of the chest was obtained.     Comparison: Chest x-ray and chest CT from 9/30/2019 chest CT.     The mediastinal cardiac silhouette is unremarkable.    The lungs are clear.     No acute osseous finding. Old bilateral rib fractures. Orthopedic   cervical hardware.    IMPRESSION:    No acute process.    < end of copied text >    MR Angio Head No Cont (11.11.19 @ 10:43) >  EXAM:  MR ANGIO BRAIN                            PROCEDURE DATE:  11/11/2019          INTERPRETATION:  INDICATION:  TIA.    TECHNIQUE:  MR angiography of the brain was performed using three   dimensional time-of-flight (3D-TOF) technique.  Imaging was performed on   a 1.5 Marlen magnet.    FINDINGS:      INTERNAL CAROTID ARTERIES:  Bilaterally patent.  No intraluminal filling   defect or dissection seen.    [place]:  No aneurysm identified.    CEREBRAL ARTERIES:  Bilaterally patent, best assessed on the axial images.    VERTEBROBASILAR SYSTEM:  No stenosis or occlusion depicted.      IMPRESSION:     Unremarkable study.    < end of copied text >      MR Head No Cont (11.11.19 @ 10:44) >  EXAM:  MR BRAIN                            PROCEDURE DATE:  11/11/2019          INTERPRETATION:  INDICATION:    TIA. Stroke.  TECHNIQUE:  Multiplanar brain imaging was conducted using a 1.5 Marlen   magnet.  T1, T2 and FLAIR imaging was performed.  In addition, diffusion   imaging, diffusion coefficient assessment (ADC) and echo planar T2* was   incorporated. No contrast administered    COMPARISON EXAMINATION:  MR dated 5/15/2017.    FINDINGS:  HEMISPHERES:  No diffusion restriction or acute ischemia is noted. No   space-occupying lesion is identified. There are mild involutional changes   commensurate with age, seen within the white matter. No microhemorrhage   or mass is noted.  VENTRICLES:  midline and normal in size  POSTERIOR FOSSA:  The brain stem and cerebellum are unremarkable in   appearance.  No CP angle abnormality is noted.  EXTRA-AXIAL:  No mass or collections are depicted.  CRANIAL NERVES:  No abnormality noted.  VASCULATURE:  The major vessels and venous sinuses are grossly patent.    SINUSES AND MASTOIDS:  Opacification is noted of the frontal sinuses,   along with ethmoid thickening right greater than left. Also there is a   bilateral mastoid partial opacification right greater than left.  MISCELLANEOUS:  A scalp lipoma is noted in the right the frontal   supraorbital region.    IMPRESSION:      1)  unremarkable MRI study of the brain. No diffusion restriction or MR   evidence of acute ischemia. No space-occupying lesion noted..  2)  chronic inflammatory changes noted in the sinuses and in the mastoid   cells, right greater than left..     < end of copied text >        US Duplex Venous Lower Ext Complete, Bilateral (11.11.19 @ 11:48) >  EXAM:  US DPLX LWR EXT VEINS COMPL BI                            PROCEDURE DATE:  11/11/2019          INTERPRETATION:  CLINICAL INFORMATION: Asthma, bilateral lower extremity   redness and swelling    COMPARISON: 09/30/2019.    TECHNIQUE: Duplex sonography of the BILATERAL LOWER extremity veins with   color and spectral Doppler, with and without compression.      FINDINGS:    There is normal compressibility of the bilateral common femoral, femoral   and popliteal veins.     Doppler examination shows normal spontaneous and phasic flow.    Calf veins not well-visualized due to large subcutaneous edema.    IMPRESSION:     No femoropopliteal deep venous thrombosis in either lower extremity.   However, the calf veins were not well-visualized dueto large   subcutaneous edema. If clinical concern persists, repeat examination   should be performed in 5-7 days.    < end of copied text >      US Duplex Carotid Arteries Complete, Bilateral (11.11.19 @ 11:50) >  EXAM:  US DPLX CAROTIDS COMPL BI                            PROCEDURE DATE:  11/11/2019          INTERPRETATION:  HISTORY: 69 yo F w sudden loss of vision in R eye   followed by loss of ability to use L hand    COMPARISON:None    Findings: There ismild to moderate calcified plaque within the carotid   bulbs, extending into the proximal ICAs. There is mild intimal thickening.    Peak systolic velocities are as follows (in cm/sec):     RIGHT:    PROX CCA = 62 ;  DIST CCA = 74 ;  PROX ICA = 54 ;DIST ICA =   44 ; ECA = 83     LEFT   :    PROX CCA = 59 ;  DIST CCA = 57 ;  PROX ICA = 82 ;  DIST ICA =   70 ; ECA = 70     There is antegrade flow through both vertebral arteries.       IMPRESSION:   No hemodynamically significant carotid artery stenoses.   Mild to moderate calcified plaque as described above.    < end of copied text >      Imaging Personally Reviewed:  YES/NO    Consultant(s) Notes Reviewed:   YES/ No    Care Discussed with Consultants :     Plan of care was discussed with patient and /or primary care giver; all questions and concerns were addressed and care was aligned with patient's wishes.

## 2019-11-12 NOTE — BEHAVIORAL HEALTH ASSESSMENT NOTE - NSBHCHARTREVIEWIMAGING_PSY_A_CORE FT
< from: MR Head No Cont (11.11.19 @ 10:44) >      PROCEDURE DATE:  11/11/2019          INTERPRETATION:  INDICATION:    TIA. Stroke.  TECHNIQUE:  Multiplanar brain imaging was conducted using a 1.5 Marlen   magnet.  T1, T2 and FLAIR imaging was performed.  In addition, diffusion   imaging, diffusion coefficient assessment (ADC) and echo planar T2* was   incorporated. No contrast administered    COMPARISON EXAMINATION:  MR dated 5/15/2017.    FINDINGS:  HEMISPHERES:  No diffusion restriction or acute ischemia is noted. No   space-occupying lesion is identified. There are mild involutional changes   commensurate with age, seen within the white matter. No microhemorrhage   or mass is noted.  VENTRICLES:  midline and normal in size  POSTERIOR FOSSA:  The brain stem and cerebellum are unremarkable in   appearance.  No CP angle abnormality is noted.  EXTRA-AXIAL:  No mass or collections are depicted.  CRANIAL NERVES:  No abnormality noted.  VASCULATURE:  The major vessels and venous sinuses are grossly patent.    SINUSES AND MASTOIDS:  Opacification is noted of the frontal sinuses,   along with ethmoid thickening right greater than left. Also there is a   bilateral mastoid partial opacification right greater than left.  MISCELLANEOUS:  A scalp lipoma is noted in the right the frontal   supraorbital region.    IMPRESSION:      1)  unremarkable MRI study of the brain. No diffusion restriction or MR   evidence of acute ischemia. No space-occupying lesion noted..  2)  chronic inflammatory changes noted in the sinuses and in the mastoid   cells, right greater than left..     < end of copied text >

## 2019-11-12 NOTE — PROGRESS NOTE ADULT - SUBJECTIVE AND OBJECTIVE BOX
Neurology Follow up note    Name  SHEKHAR VASQUEZ    HPI:  68 year old female from home lives with  (has HHA 8hrs/7days) with PMHx of Anxiety, HTN, HLD , NOEMY, asthma, COPD, DVT , Breast Cancer, Kidney cancer  in remission , Graves disease, Hypothyroid , S/P laminectomy , carpal tunnel syndrome, Obesity presents with vague complaint of SOB. Pt perseverating on idea that  is not giving her oxycodone as per usual dosing. Pt occasionally yelling out and that she is allergic to vital signs machine and then falls back asleep. pt has had multiple evaluations and admission for similar presentation recently with neg w/u. . pt denies chest pain , headache , dizziness, nausea , vomiting , diarrhea , abdominal pain , any change in bowel or urinary habits . Denies smoking, alcohol, illicit drug use.  allergic to shellfish , grapes , peaches .   	  In ED :   CXR : WNL   EKG : NSR    WBC 16K     Pt is Full code (09 Nov 2019 09:55)      Interval History -        Subjective:    Review of Systems:  Constitutional:        Eyes, Ears, Mouth, Throat:   Respiratory:                            Cardiovascular:   Gastrointestinal:                                     Genitourinary:   Musculoskeletal:                                    Dermatologic:   Neurological: as per above                                                                 Psychiatric:   Endocrine:              Hematologic/Lymphatic:     MEDICATIONS  (STANDING):  albuterol/ipratropium for Nebulization 3 milliLiter(s) Nebulizer every 6 hours  amLODIPine   Tablet 2.5 milliGRAM(s) Oral daily  aspirin  chewable 81 milliGRAM(s) Oral daily  atorvastatin 40 milliGRAM(s) Oral at bedtime  baclofen 5 milliGRAM(s) Oral three times a day  benzocaine 15 mG/menthol 3.6 mG Lozenge 1 Lozenge Oral four times a day  cholecalciferol 1000 Unit(s) Oral daily  citalopram 20 milliGRAM(s) Oral daily  clopidogrel Tablet 75 milliGRAM(s) Oral daily  cyanocobalamin Injectable 1000 MICROGram(s) SubCutaneous daily  enoxaparin Injectable 40 milliGRAM(s) SubCutaneous daily  ergocalciferol 85319 Unit(s) Oral <User Schedule>  fluticasone propionate 50 MICROgram(s)/spray Nasal Spray 1 Spray(s) Both Nostrils two times a day  folic acid 1 milliGRAM(s) Oral daily  gabapentin 100 milliGRAM(s) Oral three times a day  levothyroxine 125 MICROGram(s) Oral <User Schedule>  lidocaine   Patch 1 Patch Transdermal daily  losartan 100 milliGRAM(s) Oral daily  melatonin 3 milliGRAM(s) Oral at bedtime  montelukast 10 milliGRAM(s) Oral at bedtime  predniSONE   Tablet 40 milliGRAM(s) Oral daily  trihexyphenidyl 2 milliGRAM(s) Oral two times a day    MEDICATIONS  (PRN):  haloperidol    Injectable 0.5 milliGRAM(s) IntraMuscular every 6 hours PRN Agitation  LORazepam     Tablet 1 milliGRAM(s) Oral every 6 hours PRN Anxiety  oxyCODONE    IR 5 milliGRAM(s) Oral every 6 hours PRN Moderate Pain (4 - 6)      Allergies    Grapes (Hives)  No Known Drug Allergies  Peaches (Hives)  shellfish (Hives)    Intolerances        Objective:   Vital Signs Last 24 Hrs  T(C): 36.7 (12 Nov 2019 05:09), Max: 36.8 (11 Nov 2019 20:27)  T(F): 98.1 (12 Nov 2019 05:09), Max: 98.2 (11 Nov 2019 20:27)  HR: 60 (12 Nov 2019 09:37) (55 - 83)  BP: 152/52 (12 Nov 2019 05:09) (116/57 - 152/52)  BP(mean): --  RR: 16 (12 Nov 2019 05:09) (16 - 18)  SpO2: 97% (12 Nov 2019 09:37) (95% - 100%)    General Exam:   General appearance: No acute distress                 Cardiovascular: Pedal dorsalis pulses intact bilaterally    Neurological Exam:  Mental Status: Orientated to self, date and place.  Attention intact.  No dysarthria, aphasia or neglect.  Knowledge intact.  Registration intact.  Short and long term memory grossly intact.      Cranial Nerves: CN I - not tested.  PERRL, EOMI, VFF, no nystagmus or diplopia.  No APD.  Fundi not visualized bilaterally.  CN V1-3 intact to light touch and pinprick.  No facial asymmetry.  Hearing intact to finger rub bilaterally.  Tongue, uvula and palate midline.  Sternocleidomastoid and Trapezius intact bilaterally.    Motor:   Tone: normal.                  Strength: intact throughout  Pronator drift: none                 Dysmeria: None to finger-nose-finger or heel-shin-heel  No truncal ataxia.    Tremor: No resting, postural or action tremor.  No myoclonus.    Sensation: intact to light touch, pinprick, vibration and proprioception    Deep Tendon Reflexes: 1+ bilateral biceps, triceps, brachioradialis, knee and ankle  Toes flexor bilaterally    Gait: normal and stable.      Other:    11-12    143  |  107  |  10  ----------------------------<  110<H>  4.0   |  30  |  0.72    Ca    9.1      12 Nov 2019 07:17      11-12    143  |  107  |  10  ----------------------------<  110<H>  4.0   |  30  |  0.72    Ca    9.1      12 Nov 2019 07:17          Radiology    < from: MR Head No Cont (11.11.19 @ 10:44) >  1)  unremarkable MRI study of the brain. No diffusion restriction or MR   evidence of acute ischemia. No space-occupying lesion noted..  2)  chronic inflammatory changes noted in the sinuses and in the mastoid   cells, right greater than left..     < end of copied text >    < from: MR Angio Head No Cont (11.11.19 @ 10:43) >  IMPRESSION:     Unremarkable study.    < end of copied text > Neurology Follow up note    Name  SHEKHAR VASQUEZ    Subjective:  patient had visual loss in the right eye that lasted for 1 days and resolved, occurred 3 weeks ago  the patient also had left arm distal weakness, does not recall sleeping on her arm.  Has history of cervical problems, had recent MRI C spine and was recommended to see neurosurgery due to the abnormalities noted on MRI C spine.  Has neurosurgery appointment December 5th 2019.    Review of Systems:  Constitutional:      no fever  Respiratory:                     no cough         MEDICATIONS  (STANDING):  albuterol/ipratropium for Nebulization 3 milliLiter(s) Nebulizer every 6 hours  amLODIPine   Tablet 2.5 milliGRAM(s) Oral daily  aspirin  chewable 81 milliGRAM(s) Oral daily  atorvastatin 40 milliGRAM(s) Oral at bedtime  baclofen 5 milliGRAM(s) Oral three times a day  benzocaine 15 mG/menthol 3.6 mG Lozenge 1 Lozenge Oral four times a day  cholecalciferol 1000 Unit(s) Oral daily  citalopram 20 milliGRAM(s) Oral daily  clopidogrel Tablet 75 milliGRAM(s) Oral daily  cyanocobalamin Injectable 1000 MICROGram(s) SubCutaneous daily  enoxaparin Injectable 40 milliGRAM(s) SubCutaneous daily  ergocalciferol 32556 Unit(s) Oral <User Schedule>  fluticasone propionate 50 MICROgram(s)/spray Nasal Spray 1 Spray(s) Both Nostrils two times a day  folic acid 1 milliGRAM(s) Oral daily  gabapentin 100 milliGRAM(s) Oral three times a day  levothyroxine 125 MICROGram(s) Oral <User Schedule>  lidocaine   Patch 1 Patch Transdermal daily  losartan 100 milliGRAM(s) Oral daily  melatonin 3 milliGRAM(s) Oral at bedtime  montelukast 10 milliGRAM(s) Oral at bedtime  predniSONE   Tablet 40 milliGRAM(s) Oral daily  trihexyphenidyl 2 milliGRAM(s) Oral two times a day    MEDICATIONS  (PRN):  haloperidol    Injectable 0.5 milliGRAM(s) IntraMuscular every 6 hours PRN Agitation  LORazepam     Tablet 1 milliGRAM(s) Oral every 6 hours PRN Anxiety  oxyCODONE    IR 5 milliGRAM(s) Oral every 6 hours PRN Moderate Pain (4 - 6)      Allergies    Grapes (Hives)  No Known Drug Allergies  Peaches (Hives)  shellfish (Hives)    Intolerances        Objective:   Vital Signs Last 24 Hrs  T(C): 36.7 (12 Nov 2019 05:09), Max: 36.8 (11 Nov 2019 20:27)  T(F): 98.1 (12 Nov 2019 05:09), Max: 98.2 (11 Nov 2019 20:27)  HR: 60 (12 Nov 2019 09:37) (55 - 83)  BP: 152/52 (12 Nov 2019 05:09) (116/57 - 152/52)  BP(mean): --  RR: 16 (12 Nov 2019 05:09) (16 - 18)  SpO2: 97% (12 Nov 2019 09:37) (95% - 100%)    General Exam:   General appearance: No acute distress                 Cardiovascular: Pedal dorsalis pulses intact bilaterally    Neurological Exam:  Mental Status: Orientated to self, date and place.  Attention intact.     Cranial Nerves: CN I - not tested.  PERRL, EOMI, VFF, no nystagmus or diplopia.  No APD.  Fundi not visualized bilaterally.  CN V1-3 intact to light touch.  No facial asymmetry.     Motor:   Tone: normal.                  Strength: intact throughout in the arms except left EDC, FDI, ADM and APB    Sensation: intact to light touch    Deep Tendon Reflexes: 1+ bilateral biceps, triceps, brachioradialis, knee and ankle      Other:    11-12    143  |  107  |  10  ----------------------------<  110<H>  4.0   |  30  |  0.72    Ca    9.1      12 Nov 2019 07:17      11-12    143  |  107  |  10  ----------------------------<  110<H>  4.0   |  30  |  0.72    Ca    9.1      12 Nov 2019 07:17    Sedimentation Rate, Erythrocyte (11.10.19 @ 06:40)    Sedimentation Rate, Erythrocyte: 9 mm/Hr    C-Reactive Protein, Serum (11.10.19 @ 11:33)    C-Reactive Protein, Serum: 1.86 mg/dL          Radiology    < from: MR Head No Cont (11.11.19 @ 10:44) >  1)  unremarkable MRI study of the brain. No diffusion restriction or MR   evidence of acute ischemia. No space-occupying lesion noted..  2)  chronic inflammatory changes noted in the sinuses and in the mastoid   cells, right greater than left..     < end of copied text >    < from: MR Angio Head No Cont (11.11.19 @ 10:43) >  IMPRESSION:     Unremarkable study.    < end of copied text > Neurology Follow up note    Name  SHEKHAR VASQUEZ    Subjective:  patient had visual loss in the right eye that lasted for 1 days and resolved, occurred 3 weeks ago  the patient also had left arm distal weakness, does not recall sleeping on her arm.  Has history of cervical problems, had recent MRI C spine and was recommended to see neurosurgery due to the abnormalities noted on MRI C spine.  Has neurosurgery appointment December 5th 2019.    Review of Systems:  Constitutional:      no fever  Respiratory:                     no cough         MEDICATIONS  (STANDING):  albuterol/ipratropium for Nebulization 3 milliLiter(s) Nebulizer every 6 hours  amLODIPine   Tablet 2.5 milliGRAM(s) Oral daily  aspirin  chewable 81 milliGRAM(s) Oral daily  atorvastatin 40 milliGRAM(s) Oral at bedtime  baclofen 5 milliGRAM(s) Oral three times a day  benzocaine 15 mG/menthol 3.6 mG Lozenge 1 Lozenge Oral four times a day  cholecalciferol 1000 Unit(s) Oral daily  citalopram 20 milliGRAM(s) Oral daily  clopidogrel Tablet 75 milliGRAM(s) Oral daily  cyanocobalamin Injectable 1000 MICROGram(s) SubCutaneous daily  enoxaparin Injectable 40 milliGRAM(s) SubCutaneous daily  ergocalciferol 25292 Unit(s) Oral <User Schedule>  fluticasone propionate 50 MICROgram(s)/spray Nasal Spray 1 Spray(s) Both Nostrils two times a day  folic acid 1 milliGRAM(s) Oral daily  gabapentin 100 milliGRAM(s) Oral three times a day  levothyroxine 125 MICROGram(s) Oral <User Schedule>  lidocaine   Patch 1 Patch Transdermal daily  losartan 100 milliGRAM(s) Oral daily  melatonin 3 milliGRAM(s) Oral at bedtime  montelukast 10 milliGRAM(s) Oral at bedtime  predniSONE   Tablet 40 milliGRAM(s) Oral daily  trihexyphenidyl 2 milliGRAM(s) Oral two times a day    MEDICATIONS  (PRN):  haloperidol    Injectable 0.5 milliGRAM(s) IntraMuscular every 6 hours PRN Agitation  LORazepam     Tablet 1 milliGRAM(s) Oral every 6 hours PRN Anxiety  oxyCODONE    IR 5 milliGRAM(s) Oral every 6 hours PRN Moderate Pain (4 - 6)      Allergies    Grapes (Hives)  No Known Drug Allergies  Peaches (Hives)  shellfish (Hives)    Intolerances        Objective:   Vital Signs Last 24 Hrs  T(C): 36.7 (12 Nov 2019 05:09), Max: 36.8 (11 Nov 2019 20:27)  T(F): 98.1 (12 Nov 2019 05:09), Max: 98.2 (11 Nov 2019 20:27)  HR: 60 (12 Nov 2019 09:37) (55 - 83)  BP: 152/52 (12 Nov 2019 05:09) (116/57 - 152/52)  BP(mean): --  RR: 16 (12 Nov 2019 05:09) (16 - 18)  SpO2: 97% (12 Nov 2019 09:37) (95% - 100%)    General Exam:   General appearance: No acute distress                 Cardiovascular: Pedal dorsalis pulses intact bilaterally    Neurological Exam:  Mental Status: Orientated to self, date and place.  Attention intact.     Cranial Nerves: CN I - not tested.  PERRL, EOMI, VFF, no nystagmus or diplopia.  No APD.  Fundi not visualized bilaterally.  CN V1-3 intact to light touch.  No facial asymmetry.     Motor:   Tone: normal.                  Strength: intact throughout in the arms except left EDC, FDI, ADM and APB    Sensation: intact to light touch    Deep Tendon Reflexes: 1+ bilateral biceps, triceps, brachioradialis, knee and ankle      Other:    11-12    143  |  107  |  10  ----------------------------<  110<H>  4.0   |  30  |  0.72    Ca    9.1      12 Nov 2019 07:17      11-12    143  |  107  |  10  ----------------------------<  110<H>  4.0   |  30  |  0.72    Ca    9.1      12 Nov 2019 07:17    Sedimentation Rate, Erythrocyte (11.10.19 @ 06:40)    Sedimentation Rate, Erythrocyte: 9 mm/Hr    C-Reactive Protein, Serum (11.10.19 @ 11:33)    C-Reactive Protein, Serum: 1.86 mg/dL          Radiology    < from: MR Head No Cont (11.11.19 @ 10:44) >  1)  unremarkable MRI study of the brain. No diffusion restriction or MR   evidence of acute ischemia. No space-occupying lesion noted..  2)  chronic inflammatory changes noted in the sinuses and in the mastoid   cells, right greater than left..     < end of copied text >    < from: MR Angio Head No Cont (11.11.19 @ 10:43) >  IMPRESSION:     Unremarkable study.    < end of copied text >

## 2019-11-12 NOTE — PROGRESS NOTE ADULT - PROBLEM SELECTOR PLAN 2
left vision loss with left hand weakness   -c/w aspirin 81mg clopidogrel 75 mg  -MRI head->1)  unremarkable MRI study of the brain. No diffusion restriction or MR   evidence of acute ischemia. No space-occupying lesion noted..  2)  chronic inflammatory changes noted in the sinuses and in the mastoid   cells, right greater than left.  - MRA head->Unremarkable study.  -Carotid doppler->  No hemodynamically significant carotid artery stenoses.   Mild to moderate calcified plaque as described above.  -Dr Diaz neuro on board left vision loss with left hand weakness   -c/w aspirin 81mg clopidogrel 75 mg  -MRI head->1)  unremarkable MRI study of the brain. No diffusion restriction or MR   evidence of acute ischemia. No space-occupying lesion noted..  2)  chronic inflammatory changes noted in the sinuses and in the mastoid   cells, right greater than left.  - MRA head->Unremarkable study.  -Carotid doppler->  No hemodynamically significant carotid artery stenoses.   Mild to moderate calcified plaque as described above.  -Pt. to f/u with neuro Dr. Parada as OP 1 week post discharge

## 2019-11-12 NOTE — BEHAVIORAL HEALTH ASSESSMENT NOTE - NSBHCHARTREVIEWVS_PSY_A_CORE FT
Vital Signs Last 24 Hrs  T(C): 36.6 (13 Nov 2019 20:23), Max: 36.6 (13 Nov 2019 06:33)  T(F): 97.8 (13 Nov 2019 20:23), Max: 97.9 (13 Nov 2019 06:33)  HR: 66 (13 Nov 2019 20:23) (66 - 94)  BP: 145/69 (13 Nov 2019 20:23) (143/59 - 145/69)  BP(mean): --  RR: 16 (13 Nov 2019 20:23) (16 - 18)  SpO2: 100% (13 Nov 2019 20:23) (96% - 100%)

## 2019-11-12 NOTE — PROGRESS NOTE ADULT - PROBLEM SELECTOR PLAN 3
c/w Synthroid 125 MCG
Pt.'s spouse reports progressively worsening paranoia and anxiety, states pt.'s OP psychiatrist has retired  -Psych consulted, will f/u reccs

## 2019-11-12 NOTE — BEHAVIORAL HEALTH ASSESSMENT NOTE - SUMMARY
68 year old female from home (has HHA 8hrs/7days) with PMHx of Anxiety, HTN, HLD , NOEMY, asthma, COPD, DVT , Breast Cancer, Kidney cancer  in remission , Graves disease, Hypothyroid , presented with complaint of SOB. Admitted for concern of COPD exacerb vs anxiety. Pt reportedly anxious with periods of crying and clenching her fists to her chest stating she worries about her condition and scared of dying.  As per pt.'s spouse, pt. has become progressively more paranoid and difficult to manage for the past month.  Her OP Psychiatrist has retired and spouse is asking for psych eval.  Psych consult requested.    On assessment, she is markedly irritable, cooperative yet hostile towards psychiatric intervention. Reality testing is poor and patient spontaneously relays heavy paranoid content regarding her , son, house aid and care providers during a recent rehab stay. Chart review indicates concerns of delirium and anxiety on recent psychiatric evaluation (9/12 and 10/1 2019) and this paranoia is certainly more chronic rather than acute in nature, yet still concerning that onset is in recent months. Due to limitations in collateral from outpatient psychiatrist who is now retired, and known response to antipsychotic medication Latuda, it is possible she has an underlying psychotic disorder or mood disorder with psychotic features that is now exacerbated in the absence of appropriate treatment.     She is agreeable to resuming Latuda, has been spontaneously help seeking (presents to ED for respiratory care on her own accord), and strangely enough, has not exhibited any retaliative behaviors towards those she suspects are desiring to harm her. As such, she is not acutely exhibitting an imminent threat to her safety or those of others but her prognosis is guarded without intervention. As she is willing to resume Latuda and has explicitly expressed disinterest in psychiatric intervention otherwise, it would not be in her best interest to consider higher level of psychiatric care at this time. It is possible these symptoms may be remedied with emphasis on compliance with treatment for any underlying anxiety, mood or psychotic spectrum disorder (will increase Celexa and Add Latuda) and optimize medical care with environmental precautions to maximize cognition, minimize delirium which may be playing a role in her current symptoms (taper benzodiazepines and enforce delirium provisions).

## 2019-11-12 NOTE — BEHAVIORAL HEALTH ASSESSMENT NOTE - NSBHCONSULTMEDS_PSY_A_CORE FT
Medical management:   - Increase Celexa to 30 mg daily  - Start Latuda at 20 mg daily with dinner x3 days then increase to 40 mg daily with dinner thereafter  - Treat all acute and chronic medical comorbidities contributors as you are  - Avoid use of anticholinergic, benzodiazepine and opioid medications as such:            - Taper Ativan to 0.5 mg BID with recommendations for future discontinuation on an outpatient basis           -when opioids are absolutely needed, preference for low dose hydromorphone or oxycodone (less deliriogenic than morphine or fentanyl)  - If not already done, ensure negative UA, normal electrolytes / renal function / LFTs / ammonia levels, Vitamin D/B12 and TSH    Environmental Delirium Provisions:   - Maintain daytime awakeness / night-time sleep  - Windows open during the day and mobilize as tolerated during the day (i.e. transfer to chair / seated position even if unable to ambulate otherwise)  - Low lighting and noise avoidance to promote sleep  - Ensure access to any sensory aides used prior to admission (i.e. glasses, hearing aids etc)    Disposition:   - Optimize patient's cognition prior to finalizing disposition options  - PT/OT to determine appropriate discharge level of care with CM to facilitate placement or home care needs  - There are NO acute psychiatric contraindications to discharge when patient is medically cleared.

## 2019-11-12 NOTE — BEHAVIORAL HEALTH ASSESSMENT NOTE - NSBHCHARTREVIEWINVESTIGATE_PSY_A_CORE FT
< from: 12 Lead ECG (11.11.19 @ 04:59) >    Ventricular Rate 59 BPM    Atrial Rate 59 BPM    P-R Interval 148 ms    QRS Duration 94 ms    Q-T Interval 412 ms    QTC Calculation(Bezet) 407 ms    P Axis 54 degrees    R Axis 0 degrees    T Axis 4 degrees    Diagnosis Line Sinus bradycardia  Otherwise normal ECG    < end of copied text >

## 2019-11-12 NOTE — BEHAVIORAL HEALTH ASSESSMENT NOTE - RISK ASSESSMENT
Low Acute Suicide Risk Patient not evidencing desire or intent to harm self and despite chronic paranoia, has not engaged in any acts to harm herself or others whom she perceives are threatening her.
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
## 2019-11-12 NOTE — PROGRESS NOTE ADULT - PROBLEM SELECTOR PLAN 1
likely anxiety attacks vs COPD exacerb   c/w bronchodilators   suplemental oxygen   start prednisone taper   c/w ativan as needed  c/w [place]ast
likely anxiety attacks vs COPD exacerb   -c/w bronchodilators   -taper down supplemental oxygen   -Cont prednisone taper   -ativan as needed  -f/u psych reccs  c/w Omar

## 2019-11-12 NOTE — BEHAVIORAL HEALTH ASSESSMENT NOTE - COMMENTS ON VIOLENCE RISK/PROTECTIVE FACTORS:
Patient not evidencing desire or intent to harm self and despite chronic paranoia, has not engaged in any acts to harm herself or others whom she perceives are threatening her

## 2019-11-12 NOTE — BEHAVIORAL HEALTH ASSESSMENT NOTE - NSBHCHARTREVIEWLAB_PSY_A_CORE FT
Complete Blood Count (11.12.19 @ 07:17)    Nucleated RBC: 0 /100 WBCs    WBC Count: 11.93 K/uL    RBC Count: 3.84 M/uL    Hemoglobin: 10.7 g/dL    Hematocrit: 35.1 %    Mean Cell Volume: 91.4 fl    Mean Cell Hemoglobin: 27.9 pg    Mean Cell Hemoglobin Conc: 30.5 gm/dL    Red Cell Distrib Width: 16.8 %    Platelet Count - Automated: 133: Smear Reviewed, Result Confirmed. K/uL    Comprehensive Metabolic Panel (11.10.19 @ 06:40)    Sodium, Serum: 143 mmol/L    Potassium, Serum: 3.5 mmol/L    Chloride, Serum: 105 mmol/L    Carbon Dioxide, Serum: 34 mmol/L    Anion Gap, Serum: 4 mmol/L    Blood Urea Nitrogen, Serum: 8 mg/dL    Creatinine, Serum: 0.63 mg/dL    Glucose, Serum: 103 mg/dL    Calcium, Total Serum: 9.0 mg/dL    Protein Total, Serum: 5.3 g/dL    Albumin, Serum: 2.5 g/dL    Bilirubin Total, Serum: 1.1 mg/dL    Alkaline Phosphatase, Serum: 76 U/L    Aspartate Aminotransferase (AST/SGOT): 10 U/L    Alanine Aminotransferase (ALT/SGPT): 18 U/L DA    eGFR if Non : 91: Interpretative comment    Thyroid Stimulating Hormone, Serum (11.10.19 @ 06:40)    Thyroid Stimulating Hormone, Serum: 1.96 uU/mL

## 2019-11-12 NOTE — PROGRESS NOTE ADULT - SUBJECTIVE AND OBJECTIVE BOX
Patient is a 68y old  Female who presents with a chief complaint of SOB (11 Nov 2019 11:47)      INTERVAL HPI/OVERNIGHT EVENTS:  had asthma attack last nite  c/o spasms  wants ensure    VITAL SIGNS:  T(F): 98.1 (11-12-19 @ 05:09)  HR: 55 (11-12-19 @ 05:09)  BP: 152/52 (11-12-19 @ 05:09)  RR: 16 (11-12-19 @ 05:09)  SpO2: 95% (11-12-19 @ 05:09)  Wt(kg): --  I&O's Detail          REVIEW OF SYSTEMS:    CONSTITUTIONAL:  No fevers, chills, sweats    HEENT:  Eyes:  No diplopia or blurred vision. ENT:  No earache, sore throat or runny nose.    CARDIOVASCULAR:  No pressure, squeezing, tightness, or heaviness about the chest; no palpitations.    RESPIRATORY:  Per HPI    GASTROINTESTINAL:  No abdominal pain, nausea, vomiting or diarrhea.    GENITOURINARY:  No dysuria, frequency or urgency.    NEUROLOGIC:  No paresthesias, fasciculations, seizures or weakness.    PSYCHIATRIC:  No disorder of thought or mood.      PHYSICAL EXAM:    Constitutional:no complaints  ENMT:atnc  Neck:supple  Respiratory:clear  Cardiovascular:rr  Gastrointestinal:soft  Extremities:no edema  Vascular:intact  Neurological:non focal  Musculoskeletal:no edema, normal rom    MEDICATIONS  (STANDING):  albuterol/ipratropium for Nebulization 3 milliLiter(s) Nebulizer every 6 hours  amLODIPine   Tablet 2.5 milliGRAM(s) Oral daily  aspirin  chewable 81 milliGRAM(s) Oral daily  atorvastatin 40 milliGRAM(s) Oral at bedtime  cholecalciferol 1000 Unit(s) Oral daily  citalopram 20 milliGRAM(s) Oral daily  clopidogrel Tablet 75 milliGRAM(s) Oral daily  cyanocobalamin Injectable 1000 MICROGram(s) SubCutaneous daily  enoxaparin Injectable 40 milliGRAM(s) SubCutaneous daily  ergocalciferol 83555 Unit(s) Oral <User Schedule>  fluticasone propionate 50 MICROgram(s)/spray Nasal Spray 1 Spray(s) Both Nostrils two times a day  folic acid 1 milliGRAM(s) Oral daily  gabapentin 100 milliGRAM(s) Oral three times a day  levothyroxine 125 MICROGram(s) Oral <User Schedule>  lidocaine   Patch 1 Patch Transdermal daily  losartan 100 milliGRAM(s) Oral daily  melatonin 3 milliGRAM(s) Oral at bedtime  montelukast 10 milliGRAM(s) Oral at bedtime  predniSONE   Tablet 40 milliGRAM(s) Oral daily  trihexyphenidyl 2 milliGRAM(s) Oral two times a day    MEDICATIONS  (PRN):  haloperidol    Injectable 0.5 milliGRAM(s) IntraMuscular every 6 hours PRN Agitation  LORazepam     Tablet 1 milliGRAM(s) Oral every 6 hours PRN Anxiety  oxyCODONE    IR 5 milliGRAM(s) Oral every 6 hours PRN Moderate Pain (4 - 6)      Allergies    Grapes (Hives)  No Known Drug Allergies  Peaches (Hives)  shellfish (Hives)          LABS:                        10.7   11.93 )-----------( 133      ( 12 Nov 2019 07:17 )             35.1     11-12    143  |  107  |  10  ----------------------------<  110<H>  4.0   |  30  |  0.72    Ca    9.1      12 Nov 2019 07:17                    pro-bnp 168 11-09 @ 05:      RADIOLOGY & ADDITIONAL TESTS:  EXAM:  US DPLX CAROTIDS COMPL BI                            PROCEDURE DATE:  11/11/2019          INTERPRETATION:  HISTORY: 69 yo F w sudden loss of vision in R eye   followed by loss of ability to use L hand    COMPARISON:None    Findings: There ismild to moderate calcified plaque within the carotid   bulbs, extending into the proximal ICAs. There is mild intimal thickening.    Peak systolic velocities are as follows (in cm/sec):     RIGHT:    PROX CCA = 62 ;  DIST CCA = 74 ;  PROX ICA = 54 ;DIST ICA =   44 ; ECA = 83     LEFT   :    PROX CCA = 59 ;  DIST CCA = 57 ;  PROX ICA = 82 ;  DIST ICA =   70 ; ECA = 70     There is antegrade flow through both vertebral arteries.       IMPRESSION:   No hemodynamically significant carotid artery stenoses.   Mild to moderate calcified plaque as described above.

## 2019-11-12 NOTE — BEHAVIORAL HEALTH ASSESSMENT NOTE - SUICIDE RISK FACTORS
Mood Disorder current/past/Agitation/Severe Anxiety/Panic/Unable to engage in safety planning/Psychotic disorder current/past/Recent onset of current/past psychiatric diagnosis

## 2019-11-12 NOTE — BEHAVIORAL HEALTH ASSESSMENT NOTE - DIFFERENTIAL
Delirium, subacute to chronic  Paranoid delusional disorder  Mood disorder with psychotic features  Unspecified Anxiety

## 2019-11-12 NOTE — BEHAVIORAL HEALTH ASSESSMENT NOTE - AXIS III
HTN, HLD , NOEMY, asthma, COPD, DVT , Breast Cancer, Kidney cancer  in remission , Graves disease, Hypothyroidism

## 2019-11-13 DIAGNOSIS — F41.9 ANXIETY DISORDER, UNSPECIFIED: ICD-10-CM

## 2019-11-13 DIAGNOSIS — F05 DELIRIUM DUE TO KNOWN PHYSIOLOGICAL CONDITION: ICD-10-CM

## 2019-11-13 DIAGNOSIS — F29 UNSPECIFIED PSYCHOSIS NOT DUE TO A SUBSTANCE OR KNOWN PHYSIOLOGICAL CONDITION: ICD-10-CM

## 2019-11-13 LAB
ANION GAP SERPL CALC-SCNC: 1 MMOL/L — LOW (ref 5–17)
BUN SERPL-MCNC: 13 MG/DL — SIGNIFICANT CHANGE UP (ref 7–18)
CALCIUM SERPL-MCNC: 9.4 MG/DL — SIGNIFICANT CHANGE UP (ref 8.4–10.5)
CHLORIDE SERPL-SCNC: 106 MMOL/L — SIGNIFICANT CHANGE UP (ref 96–108)
CO2 SERPL-SCNC: 35 MMOL/L — HIGH (ref 22–31)
CREAT SERPL-MCNC: 0.75 MG/DL — SIGNIFICANT CHANGE UP (ref 0.5–1.3)
CRP SERPL-MCNC: 0.9 MG/DL — HIGH (ref 0–0.4)
ERYTHROCYTE [SEDIMENTATION RATE] IN BLOOD: 26 MM/HR — HIGH (ref 0–20)
GLUCOSE SERPL-MCNC: 99 MG/DL — SIGNIFICANT CHANGE UP (ref 70–99)
HCT VFR BLD CALC: 34.6 % — SIGNIFICANT CHANGE UP (ref 34.5–45)
HGB BLD-MCNC: 10.5 G/DL — LOW (ref 11.5–15.5)
MAGNESIUM SERPL-MCNC: 2.2 MG/DL — SIGNIFICANT CHANGE UP (ref 1.6–2.6)
MCHC RBC-ENTMCNC: 27.6 PG — SIGNIFICANT CHANGE UP (ref 27–34)
MCHC RBC-ENTMCNC: 30.3 GM/DL — LOW (ref 32–36)
MCV RBC AUTO: 91.1 FL — SIGNIFICANT CHANGE UP (ref 80–100)
NRBC # BLD: 0 /100 WBCS — SIGNIFICANT CHANGE UP (ref 0–0)
PHOSPHATE SERPL-MCNC: 2.4 MG/DL — LOW (ref 2.5–4.5)
PLATELET # BLD AUTO: 137 K/UL — LOW (ref 150–400)
POTASSIUM SERPL-MCNC: 4.6 MMOL/L — SIGNIFICANT CHANGE UP (ref 3.5–5.3)
POTASSIUM SERPL-SCNC: 4.6 MMOL/L — SIGNIFICANT CHANGE UP (ref 3.5–5.3)
RBC # BLD: 3.8 M/UL — SIGNIFICANT CHANGE UP (ref 3.8–5.2)
RBC # FLD: 17 % — HIGH (ref 10.3–14.5)
SODIUM SERPL-SCNC: 142 MMOL/L — SIGNIFICANT CHANGE UP (ref 135–145)
WBC # BLD: 9.87 K/UL — SIGNIFICANT CHANGE UP (ref 3.8–10.5)
WBC # FLD AUTO: 9.87 K/UL — SIGNIFICANT CHANGE UP (ref 3.8–10.5)

## 2019-11-13 RX ORDER — IPRATROPIUM/ALBUTEROL SULFATE 18-103MCG
3 AEROSOL WITH ADAPTER (GRAM) INHALATION ONCE
Refills: 0 | Status: COMPLETED | OUTPATIENT
Start: 2019-11-13 | End: 2019-11-13

## 2019-11-13 RX ADMIN — Medication 2 MILLIGRAM(S): at 06:37

## 2019-11-13 RX ADMIN — OXYCODONE HYDROCHLORIDE 5 MILLIGRAM(S): 5 TABLET ORAL at 13:30

## 2019-11-13 RX ADMIN — Medication 3 MILLILITER(S): at 00:39

## 2019-11-13 RX ADMIN — OXYCODONE HYDROCHLORIDE 5 MILLIGRAM(S): 5 TABLET ORAL at 04:27

## 2019-11-13 RX ADMIN — OXYCODONE HYDROCHLORIDE 5 MILLIGRAM(S): 5 TABLET ORAL at 22:04

## 2019-11-13 RX ADMIN — OXYCODONE HYDROCHLORIDE 5 MILLIGRAM(S): 5 TABLET ORAL at 22:34

## 2019-11-13 RX ADMIN — Medication 3 MILLIGRAM(S): at 22:01

## 2019-11-13 RX ADMIN — Medication 125 MICROGRAM(S): at 06:37

## 2019-11-13 RX ADMIN — Medication 40 MILLIGRAM(S): at 06:37

## 2019-11-13 RX ADMIN — ATORVASTATIN CALCIUM 40 MILLIGRAM(S): 80 TABLET, FILM COATED ORAL at 22:04

## 2019-11-13 RX ADMIN — BENZOCAINE AND MENTHOL 1 LOZENGE: 5; 1 LIQUID ORAL at 07:24

## 2019-11-13 RX ADMIN — Medication 2 MILLIGRAM(S): at 17:16

## 2019-11-13 RX ADMIN — Medication 5 MILLIGRAM(S): at 22:02

## 2019-11-13 RX ADMIN — AMLODIPINE BESYLATE 2.5 MILLIGRAM(S): 2.5 TABLET ORAL at 06:37

## 2019-11-13 RX ADMIN — CLOPIDOGREL BISULFATE 75 MILLIGRAM(S): 75 TABLET, FILM COATED ORAL at 12:23

## 2019-11-13 RX ADMIN — Medication 81 MILLIGRAM(S): at 12:23

## 2019-11-13 RX ADMIN — LIDOCAINE 1 PATCH: 4 CREAM TOPICAL at 19:45

## 2019-11-13 RX ADMIN — Medication 3 MILLILITER(S): at 20:46

## 2019-11-13 RX ADMIN — OXYCODONE HYDROCHLORIDE 5 MILLIGRAM(S): 5 TABLET ORAL at 12:35

## 2019-11-13 RX ADMIN — CITALOPRAM 30 MILLIGRAM(S): 10 TABLET, FILM COATED ORAL at 17:03

## 2019-11-13 RX ADMIN — Medication 0.5 MILLIGRAM(S): at 16:57

## 2019-11-13 RX ADMIN — BENZOCAINE AND MENTHOL 1 LOZENGE: 5; 1 LIQUID ORAL at 18:32

## 2019-11-13 RX ADMIN — Medication 3 MILLILITER(S): at 15:06

## 2019-11-13 RX ADMIN — ENOXAPARIN SODIUM 40 MILLIGRAM(S): 100 INJECTION SUBCUTANEOUS at 12:24

## 2019-11-13 RX ADMIN — Medication 0.5 MILLIGRAM(S): at 10:02

## 2019-11-13 RX ADMIN — LIDOCAINE 1 PATCH: 4 CREAM TOPICAL at 00:09

## 2019-11-13 RX ADMIN — BENZOCAINE AND MENTHOL 1 LOZENGE: 5; 1 LIQUID ORAL at 12:27

## 2019-11-13 RX ADMIN — LURASIDONE HYDROCHLORIDE 20 MILLIGRAM(S): 40 TABLET ORAL at 19:35

## 2019-11-13 RX ADMIN — LIDOCAINE 1 PATCH: 4 CREAM TOPICAL at 12:25

## 2019-11-13 RX ADMIN — Medication 1000 UNIT(S): at 12:24

## 2019-11-13 RX ADMIN — Medication 1 MILLIGRAM(S): at 12:23

## 2019-11-13 RX ADMIN — GABAPENTIN 100 MILLIGRAM(S): 400 CAPSULE ORAL at 22:02

## 2019-11-13 RX ADMIN — GABAPENTIN 100 MILLIGRAM(S): 400 CAPSULE ORAL at 06:37

## 2019-11-13 RX ADMIN — OXYCODONE HYDROCHLORIDE 5 MILLIGRAM(S): 5 TABLET ORAL at 03:57

## 2019-11-13 RX ADMIN — Medication 0.5 MILLIGRAM(S): at 02:01

## 2019-11-13 RX ADMIN — Medication 5 MILLIGRAM(S): at 14:05

## 2019-11-13 RX ADMIN — MONTELUKAST 10 MILLIGRAM(S): 4 TABLET, CHEWABLE ORAL at 22:01

## 2019-11-13 RX ADMIN — Medication 3 MILLILITER(S): at 02:59

## 2019-11-13 RX ADMIN — LOSARTAN POTASSIUM 100 MILLIGRAM(S): 100 TABLET, FILM COATED ORAL at 06:37

## 2019-11-13 RX ADMIN — GABAPENTIN 100 MILLIGRAM(S): 400 CAPSULE ORAL at 14:06

## 2019-11-13 RX ADMIN — Medication 3 MILLILITER(S): at 09:16

## 2019-11-13 NOTE — CHART NOTE - NSCHARTNOTEFT_GEN_A_CORE
EVENT:   Patient with hx. COPD, SOB, had BP - 169/69, HR - 74. C/o expiratory wheezing and requested neb. tx.   OBJECTIVE:  Vital Signs Last 24 Hrs  T(C): 36.8 (12 Nov 2019 20:25), Max: 37 (12 Nov 2019 14:41)  T(F): 98.2 (12 Nov 2019 20:25), Max: 98.6 (12 Nov 2019 14:41)  HR: 71 (13 Nov 2019 02:00) (60 - 81)  BP: 143/59 (13 Nov 2019 02:00) (143/59 - 169/69)  BP(mean): --  RR: 16 (13 Nov 2019 02:00) (16 - 18)  SpO2: 100% (13 Nov 2019 02:00) (96% - 100%)    FOCUSED PHYSICAL EXAM:    LABS:                        10.7   11.93 )-----------( 133      ( 12 Nov 2019 07:17 )             35.1     11-12    143  |  107  |  10  ----------------------------<  110<H>  4.0   |  30  |  0.72    Ca    9.1      12 Nov 2019 07:17        EKG:   IMAGING:    ASSESSMENT:  PLAN: Norvasc 2.5 mg po x1 dose. Duoneb 3 ml ne. tx. x 1 dose for wheezing/dyspnea.     FOLLOW UP / RESULT:

## 2019-11-13 NOTE — PROGRESS NOTE ADULT - SUBJECTIVE AND OBJECTIVE BOX
I was asked to see patient for possible temporal artery biopsy.  Patient wants no further procedures done, she wants to go to rehab

## 2019-11-13 NOTE — PROGRESS NOTE ADULT - SUBJECTIVE AND OBJECTIVE BOX
Patient is a 68y old  Female who presents with a chief complaint of SOB (12 Nov 2019 13:51)      INTERVAL HPI/OVERNIGHT EVENTS:  needed nebs last nite x2-no relief    VITAL SIGNS:  T(F): 97.9 (11-13-19 @ 06:33)  HR: 94 (11-13-19 @ 06:33)  BP: 144/88 (11-13-19 @ 06:33)  RR: 16 (11-13-19 @ 06:33)  SpO2: 100% (11-13-19 @ 06:33)  Wt(kg): --  I&O's Detail          REVIEW OF SYSTEMS:    CONSTITUTIONAL:  No fevers, chills, sweats    HEENT:  Eyes:  No diplopia or blurred vision. ENT:  No earache, sore throat or runny nose.sore throat, rt ear ache    CARDIOVASCULAR:  No pressure, squeezing, tightness, or heaviness about the chest; no palpitations.    RESPIRATORY:  Per HPI    GASTROINTESTINAL:  No abdominal pain, nausea, vomiting or diarrhea.    GENITOURINARY:  No dysuria, frequency or urgency.    NEUROLOGIC:  No paresthesias, fasciculations, seizures or weakness.hand contractions    PSYCHIATRIC:  No disorder of thought or mood.      PHYSICAL EXAM:    Constitutional:no complaints  ENMT:atnc  Neck:supple  Respiratory:clear  Cardiovascular:rr  Gastrointestinal:soft  Extremities:no edema  Vascular:intact  Neurological:non focal  Musculoskeletal:no edema, hands contracted    MEDICATIONS  (STANDING):  albuterol/ipratropium for Nebulization 3 milliLiter(s) Nebulizer every 6 hours  amLODIPine   Tablet 2.5 milliGRAM(s) Oral daily  aspirin  chewable 81 milliGRAM(s) Oral daily  atorvastatin 40 milliGRAM(s) Oral at bedtime  baclofen 5 milliGRAM(s) Oral three times a day  benzocaine 15 mG/menthol 3.6 mG Lozenge 1 Lozenge Oral four times a day  cholecalciferol 1000 Unit(s) Oral daily  citalopram 30 milliGRAM(s) Oral daily  clopidogrel Tablet 75 milliGRAM(s) Oral daily  cyanocobalamin Injectable 1000 MICROGram(s) SubCutaneous daily  enoxaparin Injectable 40 milliGRAM(s) SubCutaneous daily  ergocalciferol 96374 Unit(s) Oral <User Schedule>  fluticasone propionate 50 MICROgram(s)/spray Nasal Spray 1 Spray(s) Both Nostrils two times a day  folic acid 1 milliGRAM(s) Oral daily  gabapentin 100 milliGRAM(s) Oral three times a day  levothyroxine 125 MICROGram(s) Oral <User Schedule>  lidocaine   Patch 1 Patch Transdermal daily  losartan 100 milliGRAM(s) Oral daily  lurasidone 20 milliGRAM(s) Oral daily  melatonin 3 milliGRAM(s) Oral at bedtime  montelukast 10 milliGRAM(s) Oral at bedtime  predniSONE   Tablet 40 milliGRAM(s) Oral daily  trihexyphenidyl 2 milliGRAM(s) Oral two times a day    MEDICATIONS  (PRN):  haloperidol    Injectable 0.5 milliGRAM(s) IntraMuscular every 6 hours PRN Agitation  LORazepam     Tablet 0.5 milliGRAM(s) Oral every 6 hours PRN Agitation  oxyCODONE    IR 5 milliGRAM(s) Oral every 6 hours PRN Moderate Pain (4 - 6)      Allergies    Grapes (Hives)  No Known Drug Allergies  Peaches (Hives)  shellfish (Hives)        LABS:                        10.5   9.87  )-----------( 137      ( 13 Nov 2019 06:13 )             34.6     11-13    142  |  106  |  13  ----------------------------<  99  4.6   |  35<H>  |  0.75    Ca    9.4      13 Nov 2019 06:13  Phos  2.4     11-13  Mg     2.2     11-13                CAPILLARY BLOOD GLUCOSE      POCT Blood Glucose.: 144 mg/dL (12 Nov 2019 23:51)    pro-bnp 168 11-09 @ 05:36

## 2019-11-14 ENCOUNTER — TRANSCRIPTION ENCOUNTER (OUTPATIENT)
Age: 69
End: 2019-11-14

## 2019-11-14 VITALS
HEART RATE: 78 BPM | TEMPERATURE: 98 F | RESPIRATION RATE: 16 BRPM | OXYGEN SATURATION: 98 % | DIASTOLIC BLOOD PRESSURE: 75 MMHG | SYSTOLIC BLOOD PRESSURE: 154 MMHG

## 2019-11-14 DIAGNOSIS — F22 DELUSIONAL DISORDERS: ICD-10-CM

## 2019-11-14 LAB
ANION GAP SERPL CALC-SCNC: 5 MMOL/L — SIGNIFICANT CHANGE UP (ref 5–17)
BUN SERPL-MCNC: 11 MG/DL — SIGNIFICANT CHANGE UP (ref 7–18)
CALCIUM SERPL-MCNC: 9.8 MG/DL — SIGNIFICANT CHANGE UP (ref 8.4–10.5)
CHLORIDE SERPL-SCNC: 105 MMOL/L — SIGNIFICANT CHANGE UP (ref 96–108)
CO2 SERPL-SCNC: 31 MMOL/L — SIGNIFICANT CHANGE UP (ref 22–31)
CREAT SERPL-MCNC: 0.69 MG/DL — SIGNIFICANT CHANGE UP (ref 0.5–1.3)
GLUCOSE SERPL-MCNC: 77 MG/DL — SIGNIFICANT CHANGE UP (ref 70–99)
HCT VFR BLD CALC: 36 % — SIGNIFICANT CHANGE UP (ref 34.5–45)
HGB BLD-MCNC: 11 G/DL — LOW (ref 11.5–15.5)
MCHC RBC-ENTMCNC: 27.7 PG — SIGNIFICANT CHANGE UP (ref 27–34)
MCHC RBC-ENTMCNC: 30.6 GM/DL — LOW (ref 32–36)
MCV RBC AUTO: 90.7 FL — SIGNIFICANT CHANGE UP (ref 80–100)
NRBC # BLD: 0 /100 WBCS — SIGNIFICANT CHANGE UP (ref 0–0)
PLATELET # BLD AUTO: 146 K/UL — LOW (ref 150–400)
POTASSIUM SERPL-MCNC: 3.8 MMOL/L — SIGNIFICANT CHANGE UP (ref 3.5–5.3)
POTASSIUM SERPL-SCNC: 3.8 MMOL/L — SIGNIFICANT CHANGE UP (ref 3.5–5.3)
RBC # BLD: 3.97 M/UL — SIGNIFICANT CHANGE UP (ref 3.8–5.2)
RBC # FLD: 16.8 % — HIGH (ref 10.3–14.5)
SODIUM SERPL-SCNC: 141 MMOL/L — SIGNIFICANT CHANGE UP (ref 135–145)
WBC # BLD: 9.44 K/UL — SIGNIFICANT CHANGE UP (ref 3.8–10.5)
WBC # FLD AUTO: 9.44 K/UL — SIGNIFICANT CHANGE UP (ref 3.8–10.5)

## 2019-11-14 PROCEDURE — 70544 MR ANGIOGRAPHY HEAD W/O DYE: CPT

## 2019-11-14 PROCEDURE — 84484 ASSAY OF TROPONIN QUANT: CPT

## 2019-11-14 PROCEDURE — 71045 X-RAY EXAM CHEST 1 VIEW: CPT

## 2019-11-14 PROCEDURE — 82962 GLUCOSE BLOOD TEST: CPT

## 2019-11-14 PROCEDURE — 80048 BASIC METABOLIC PNL TOTAL CA: CPT

## 2019-11-14 PROCEDURE — 80061 LIPID PANEL: CPT

## 2019-11-14 PROCEDURE — 93880 EXTRACRANIAL BILAT STUDY: CPT

## 2019-11-14 PROCEDURE — 99285 EMERGENCY DEPT VISIT HI MDM: CPT | Mod: 25

## 2019-11-14 PROCEDURE — 82746 ASSAY OF FOLIC ACID SERUM: CPT

## 2019-11-14 PROCEDURE — 85652 RBC SED RATE AUTOMATED: CPT

## 2019-11-14 PROCEDURE — 99233 SBSQ HOSP IP/OBS HIGH 50: CPT

## 2019-11-14 PROCEDURE — 93970 EXTREMITY STUDY: CPT

## 2019-11-14 PROCEDURE — 82607 VITAMIN B-12: CPT

## 2019-11-14 PROCEDURE — 36415 COLL VENOUS BLD VENIPUNCTURE: CPT

## 2019-11-14 PROCEDURE — 83036 HEMOGLOBIN GLYCOSYLATED A1C: CPT

## 2019-11-14 PROCEDURE — 82306 VITAMIN D 25 HYDROXY: CPT

## 2019-11-14 PROCEDURE — 84100 ASSAY OF PHOSPHORUS: CPT

## 2019-11-14 PROCEDURE — 83735 ASSAY OF MAGNESIUM: CPT

## 2019-11-14 PROCEDURE — 83880 ASSAY OF NATRIURETIC PEPTIDE: CPT

## 2019-11-14 PROCEDURE — 70551 MRI BRAIN STEM W/O DYE: CPT

## 2019-11-14 PROCEDURE — 86140 C-REACTIVE PROTEIN: CPT

## 2019-11-14 PROCEDURE — 93005 ELECTROCARDIOGRAM TRACING: CPT

## 2019-11-14 PROCEDURE — 85027 COMPLETE CBC AUTOMATED: CPT

## 2019-11-14 PROCEDURE — 94640 AIRWAY INHALATION TREATMENT: CPT

## 2019-11-14 PROCEDURE — 82248 BILIRUBIN DIRECT: CPT

## 2019-11-14 PROCEDURE — 84443 ASSAY THYROID STIM HORMONE: CPT

## 2019-11-14 PROCEDURE — 80053 COMPREHEN METABOLIC PANEL: CPT

## 2019-11-14 RX ORDER — LOSARTAN POTASSIUM 100 MG/1
1 TABLET, FILM COATED ORAL
Qty: 0 | Refills: 0 | DISCHARGE
Start: 2019-11-14

## 2019-11-14 RX ORDER — ASPIRIN/CALCIUM CARB/MAGNESIUM 324 MG
1 TABLET ORAL
Qty: 30 | Refills: 0

## 2019-11-14 RX ORDER — MONTELUKAST 4 MG/1
1 TABLET, CHEWABLE ORAL
Qty: 0 | Refills: 0 | DISCHARGE

## 2019-11-14 RX ORDER — LURASIDONE HYDROCHLORIDE 40 MG/1
1 TABLET ORAL
Qty: 0 | Refills: 0 | DISCHARGE
Start: 2019-11-14

## 2019-11-14 RX ORDER — OLANZAPINE 15 MG/1
2.5 TABLET, FILM COATED ORAL
Refills: 0 | Status: DISCONTINUED | OUTPATIENT
Start: 2019-11-14 | End: 2019-11-14

## 2019-11-14 RX ORDER — PANTOPRAZOLE SODIUM 20 MG/1
40 TABLET, DELAYED RELEASE ORAL DAILY
Refills: 0 | Status: DISCONTINUED | OUTPATIENT
Start: 2019-11-14 | End: 2019-11-14

## 2019-11-14 RX ORDER — ASPIRIN/CALCIUM CARB/MAGNESIUM 324 MG
1 TABLET ORAL
Qty: 0 | Refills: 0 | DISCHARGE
Start: 2019-11-14

## 2019-11-14 RX ORDER — TRIHEXYPHENIDYL HCL 2 MG
1 TABLET ORAL
Qty: 0 | Refills: 0 | DISCHARGE

## 2019-11-14 RX ORDER — CHOLECALCIFEROL (VITAMIN D3) 125 MCG
1000 CAPSULE ORAL
Qty: 0 | Refills: 0 | DISCHARGE
Start: 2019-11-14

## 2019-11-14 RX ORDER — FLUTICASONE PROPIONATE 50 MCG
1 SPRAY, SUSPENSION NASAL
Qty: 0 | Refills: 0 | DISCHARGE
Start: 2019-11-14

## 2019-11-14 RX ORDER — AMLODIPINE BESYLATE 2.5 MG/1
1 TABLET ORAL
Qty: 0 | Refills: 0 | DISCHARGE
Start: 2019-11-14

## 2019-11-14 RX ORDER — LANOLIN ALCOHOL/MO/W.PET/CERES
1 CREAM (GRAM) TOPICAL
Qty: 0 | Refills: 0 | DISCHARGE
Start: 2019-11-14

## 2019-11-14 RX ORDER — LURASIDONE HYDROCHLORIDE 40 MG/1
40 TABLET ORAL DAILY
Refills: 0 | Status: DISCONTINUED | OUTPATIENT
Start: 2019-11-14 | End: 2019-11-14

## 2019-11-14 RX ORDER — GABAPENTIN 400 MG/1
1 CAPSULE ORAL
Qty: 0 | Refills: 0 | DISCHARGE

## 2019-11-14 RX ORDER — PANTOPRAZOLE SODIUM 20 MG/1
1 TABLET, DELAYED RELEASE ORAL
Qty: 0 | Refills: 0 | DISCHARGE
Start: 2019-11-14

## 2019-11-14 RX ORDER — CLOPIDOGREL BISULFATE 75 MG/1
1 TABLET, FILM COATED ORAL
Qty: 0 | Refills: 0 | DISCHARGE
Start: 2019-11-14

## 2019-11-14 RX ORDER — GABAPENTIN 400 MG/1
1 CAPSULE ORAL
Qty: 0 | Refills: 0 | DISCHARGE
Start: 2019-11-14

## 2019-11-14 RX ORDER — BACLOFEN 100 %
1 POWDER (GRAM) MISCELLANEOUS
Qty: 0 | Refills: 0 | DISCHARGE
Start: 2019-11-14

## 2019-11-14 RX ORDER — ATORVASTATIN CALCIUM 80 MG/1
1 TABLET, FILM COATED ORAL
Qty: 30 | Refills: 0

## 2019-11-14 RX ORDER — LOSARTAN POTASSIUM 100 MG/1
1 TABLET, FILM COATED ORAL
Qty: 30 | Refills: 0

## 2019-11-14 RX ORDER — PREGABALIN 225 MG/1
1 CAPSULE ORAL
Qty: 30 | Refills: 0
Start: 2019-11-14 | End: 2019-12-13

## 2019-11-14 RX ORDER — AMLODIPINE BESYLATE 2.5 MG/1
0 TABLET ORAL
Qty: 90 | Refills: 0 | DISCHARGE

## 2019-11-14 RX ORDER — LEVOTHYROXINE SODIUM 125 MCG
1 TABLET ORAL
Qty: 0 | Refills: 0 | DISCHARGE
Start: 2019-11-14

## 2019-11-14 RX ORDER — OLANZAPINE 15 MG/1
1 TABLET, FILM COATED ORAL
Qty: 0 | Refills: 0 | DISCHARGE
Start: 2019-11-14

## 2019-11-14 RX ORDER — BENZOCAINE AND MENTHOL 5; 1 G/100ML; G/100ML
1 LIQUID ORAL
Qty: 0 | Refills: 0 | DISCHARGE
Start: 2019-11-14

## 2019-11-14 RX ORDER — LIDOCAINE 4 G/100G
1 CREAM TOPICAL
Qty: 0 | Refills: 0 | DISCHARGE
Start: 2019-11-14

## 2019-11-14 RX ORDER — CITALOPRAM 10 MG/1
1 TABLET, FILM COATED ORAL
Qty: 0 | Refills: 0 | DISCHARGE

## 2019-11-14 RX ORDER — TRIHEXYPHENIDYL HCL 2 MG
1 TABLET ORAL
Qty: 0 | Refills: 0 | DISCHARGE
Start: 2019-11-14

## 2019-11-14 RX ORDER — CITALOPRAM 10 MG/1
3 TABLET, FILM COATED ORAL
Qty: 0 | Refills: 0 | DISCHARGE
Start: 2019-11-14

## 2019-11-14 RX ORDER — MONTELUKAST 4 MG/1
1 TABLET, CHEWABLE ORAL
Qty: 0 | Refills: 0 | DISCHARGE
Start: 2019-11-14

## 2019-11-14 RX ORDER — LEVOTHYROXINE SODIUM 125 MCG
0 TABLET ORAL
Qty: 90 | Refills: 0 | DISCHARGE

## 2019-11-14 RX ORDER — IPRATROPIUM/ALBUTEROL SULFATE 18-103MCG
3 AEROSOL WITH ADAPTER (GRAM) INHALATION
Qty: 0 | Refills: 0 | DISCHARGE
Start: 2019-11-14

## 2019-11-14 RX ORDER — ATORVASTATIN CALCIUM 80 MG/1
1 TABLET, FILM COATED ORAL
Qty: 0 | Refills: 0 | DISCHARGE
Start: 2019-11-14

## 2019-11-14 RX ADMIN — Medication 1000 UNIT(S): at 12:08

## 2019-11-14 RX ADMIN — Medication 3 MILLILITER(S): at 02:00

## 2019-11-14 RX ADMIN — GABAPENTIN 100 MILLIGRAM(S): 400 CAPSULE ORAL at 05:52

## 2019-11-14 RX ADMIN — LIDOCAINE 1 PATCH: 4 CREAM TOPICAL at 00:25

## 2019-11-14 RX ADMIN — BENZOCAINE AND MENTHOL 1 LOZENGE: 5; 1 LIQUID ORAL at 13:08

## 2019-11-14 RX ADMIN — Medication 0.5 MILLIGRAM(S): at 16:17

## 2019-11-14 RX ADMIN — Medication 81 MILLIGRAM(S): at 12:10

## 2019-11-14 RX ADMIN — Medication 3 MILLILITER(S): at 09:28

## 2019-11-14 RX ADMIN — Medication 5 MILLIGRAM(S): at 13:54

## 2019-11-14 RX ADMIN — OXYCODONE HYDROCHLORIDE 5 MILLIGRAM(S): 5 TABLET ORAL at 05:59

## 2019-11-14 RX ADMIN — CLOPIDOGREL BISULFATE 75 MILLIGRAM(S): 75 TABLET, FILM COATED ORAL at 12:08

## 2019-11-14 RX ADMIN — Medication 0.5 MILLIGRAM(S): at 08:51

## 2019-11-14 RX ADMIN — Medication 3 MILLILITER(S): at 14:21

## 2019-11-14 RX ADMIN — Medication 0.5 MILLIGRAM(S): at 02:00

## 2019-11-14 RX ADMIN — CITALOPRAM 30 MILLIGRAM(S): 10 TABLET, FILM COATED ORAL at 12:09

## 2019-11-14 RX ADMIN — Medication 40 MILLIGRAM(S): at 05:52

## 2019-11-14 RX ADMIN — GABAPENTIN 100 MILLIGRAM(S): 400 CAPSULE ORAL at 13:35

## 2019-11-14 RX ADMIN — OXYCODONE HYDROCHLORIDE 5 MILLIGRAM(S): 5 TABLET ORAL at 13:04

## 2019-11-14 RX ADMIN — Medication 125 MICROGRAM(S): at 05:52

## 2019-11-14 RX ADMIN — LURASIDONE HYDROCHLORIDE 40 MILLIGRAM(S): 40 TABLET ORAL at 13:35

## 2019-11-14 RX ADMIN — OXYCODONE HYDROCHLORIDE 5 MILLIGRAM(S): 5 TABLET ORAL at 12:04

## 2019-11-14 RX ADMIN — Medication 2 MILLIGRAM(S): at 05:52

## 2019-11-14 RX ADMIN — Medication 5 MILLIGRAM(S): at 05:52

## 2019-11-14 RX ADMIN — ENOXAPARIN SODIUM 40 MILLIGRAM(S): 100 INJECTION SUBCUTANEOUS at 12:08

## 2019-11-14 RX ADMIN — OXYCODONE HYDROCHLORIDE 5 MILLIGRAM(S): 5 TABLET ORAL at 06:29

## 2019-11-14 RX ADMIN — LIDOCAINE 1 PATCH: 4 CREAM TOPICAL at 12:08

## 2019-11-14 RX ADMIN — Medication 1 MILLIGRAM(S): at 12:09

## 2019-11-14 NOTE — DISCHARGE NOTE NURSING/CASE MANAGEMENT/SOCIAL WORK - PATIENT PORTAL LINK FT
You can access the FollowMyHealth Patient Portal offered by [facility] by registering at the following website: [email or web address]. By joining [email or web address]’s FollowMyHealth portal, you will also be able to view your health information using other applications (apps) compatible with our system.

## 2019-11-14 NOTE — PROGRESS NOTE ADULT - SUBJECTIVE AND OBJECTIVE BOX
Patient is a 68y old  Female who presents with a chief complaint of SOB (13 Nov 2019 14:12)      INTERVAL HPI/OVERNIGHT EVENTS:  had 2 asthma attacks last nite that resolved without tx  pt declined temporal artery bx    VITAL SIGNS:  T(F): 98 (11-14-19 @ 05:13)  HR: 55 (11-14-19 @ 05:50)  BP: 146/65 (11-14-19 @ 05:13)  RR: 17 (11-14-19 @ 05:13)  SpO2: 100% (11-14-19 @ 05:13)  Wt(kg): --  I&O's Detail          REVIEW OF SYSTEMS:    CONSTITUTIONAL:  No fevers, chills, sweats    HEENT:  Eyes:  No diplopia or blurred vision. ENT:  No earache, sore throat or runny nose.    CARDIOVASCULAR:  No pressure, squeezing, tightness, or heaviness about the chest; no palpitations.    RESPIRATORY:  Per HPI    GASTROINTESTINAL:  No abdominal pain, nausea, vomiting or diarrhea.    GENITOURINARY:  No dysuria, frequency or urgency.    NEUROLOGIC:  No paresthesias, fasciculations, seizures or weakness.    PSYCHIATRIC:  No disorder of thought or mood.      PHYSICAL EXAM:    Constitutional:no complaints  ENMT:atnc  Neck:supple  Respiratory:clear  Cardiovascular:rr  Gastrointestinal:soft  Extremities:no edema  Vascular:intact  Neurological:non focal  Musculoskeletal:no edema, normal rom    MEDICATIONS  (STANDING):  albuterol/ipratropium for Nebulization 3 milliLiter(s) Nebulizer every 6 hours  amLODIPine   Tablet 2.5 milliGRAM(s) Oral daily  aspirin  chewable 81 milliGRAM(s) Oral daily  atorvastatin 40 milliGRAM(s) Oral at bedtime  baclofen 5 milliGRAM(s) Oral three times a day  benzocaine 15 mG/menthol 3.6 mG Lozenge 1 Lozenge Oral four times a day  cholecalciferol 1000 Unit(s) Oral daily  citalopram 30 milliGRAM(s) Oral daily  clopidogrel Tablet 75 milliGRAM(s) Oral daily  cyanocobalamin Injectable 1000 MICROGram(s) SubCutaneous daily  enoxaparin Injectable 40 milliGRAM(s) SubCutaneous daily  ergocalciferol 96373 Unit(s) Oral <User Schedule>  fluticasone propionate 50 MICROgram(s)/spray Nasal Spray 1 Spray(s) Both Nostrils two times a day  folic acid 1 milliGRAM(s) Oral daily  gabapentin 100 milliGRAM(s) Oral three times a day  levothyroxine 125 MICROGram(s) Oral <User Schedule>  lidocaine   Patch 1 Patch Transdermal daily  losartan 100 milliGRAM(s) Oral daily  lurasidone 20 milliGRAM(s) Oral daily  melatonin 3 milliGRAM(s) Oral at bedtime  montelukast 10 milliGRAM(s) Oral at bedtime  trihexyphenidyl 2 milliGRAM(s) Oral two times a day    MEDICATIONS  (PRN):  LORazepam     Tablet 0.5 milliGRAM(s) Oral every 6 hours PRN Agitation  oxyCODONE    IR 5 milliGRAM(s) Oral every 6 hours PRN Moderate Pain (4 - 6)      Allergies    Grapes (Hives)  No Known Drug Allergies  Peaches (Hives)  shellfish (Hives)        LABS:                        11.0   9.44  )-----------( 146      ( 14 Nov 2019 06:47 )             36.0     11-14    141  |  105  |  11  ----------------------------<  77  3.8   |  31  |  0.69    Ca    9.8      14 Nov 2019 06:47  Phos  2.4     11-13  Mg     2.2     11-13                    pro-bnp 168 11-09 @ 05:3

## 2019-11-14 NOTE — PROGRESS NOTE BEHAVIORAL HEALTH - DETAILS
Somatically preoccupied with respiratory symptoms Chest pain/tightness as per HPI Somatically preoccupied with respiratory symptoms, hx of NOEMY intermittent nausea, hx of ulcer rib pain and back pain paresthesias As per HPI N/A

## 2019-11-14 NOTE — PROGRESS NOTE BEHAVIORAL HEALTH - NSBHCONSULTRECOMMENDOTHER_PSY_A_CORE FT
Ensure thorough follow up for likely triggers for medical concerns as such:   - Sleep medicine follow up to re-visit CPAP options given newer technology to minimize apneic episodes at night which may further be triggering her perceived "asthma attacks"   - Gastroenterology follow up for gastritis, gastric ulcers and duodenitis reassessment / workup and treatment as her perceived "asthma attacks" which she relays occur during the day "whenever I eat or take my meds" may in fact be an indication of recurrent peptic ulcer disease  - Allergy testing on outpatient basis

## 2019-11-14 NOTE — PROGRESS NOTE BEHAVIORAL HEALTH - NSBHCONSULTMEDS_PSY_A_CORE FT
Continue Celexa at 30 mg daily  Increase Latuda at 40 mg daily with dinner x1 week then 60 mg daily with dinner x1 week then 80 mg daily with dinner thereafter (to target mood, psychosis and anxiety)  patient can continue with Ativan at 0.5 mg BID for now (was 1 mg BID for many years so would not want to rapidly taper) with recommendations for future discontinuation on an outpatient basis

## 2019-11-14 NOTE — PROGRESS NOTE ADULT - ASSESSMENT
-anxiety disorder  -hx; asthma/allelrgies  -hx; htn,hld,melba,hypothyroid,breast/kidney cancer    plan; neuropsych f/u          nebs          flonase          singulair          ativan/celexa          dvt ppx          synthroid          wants to go to rehab
-anxiety disorder  -neuro  recommneding rt temporal artery bx  -hx; asthma/allergies  -hx; htn,hld,melba,hypothyroid,breast/kidney cancer    plan; surgical consult for temporal artery bx- paged          nebs          flonase          singulair          ativan/celexa          dvt ppx          synthroid          baclofen          phy tx          ensure          dimetapp/throat c/s          discharge plan to rehab
-anxiety disorder  -neuro  recommneding rt temporal artery bx  -hx; asthma/allergies  -hx; htn,hld,melba,hypothyroid,breast/kidney cancer    plan; surgical consult for temporal artery bx- paged          nebs          flonase          singulair          ativan/celexa          dvt ppx          synthroid          baclofen          phy tx          ensure          dimetapp/throat c/s          psych f/u given ongoing anxiety          discharge plan to rehab   discussed with resident
-anxiety disorder  -neuro w/u negative  -hx; asthma/allergies  -hx; htn,hld,melba,hypothyroid,breast/kidney cancer    plan; neuro/psych f/u          nebs          flonase          singulair          ativan/celexa          dvt ppx          synthroid          add baclofen          phy tx          discharge plan to rehab to rehab
68 year old female from home (has HHA 8hrs/7days) with PMHx of Anxiety, HTN, HLD , NOEMY, asthma, COPD, DVT , Breast Cancer, Kidney cancer  in remission , Graves disease, Hypothyroid , presented with complaint of SOB. Admitted for concern of COPD exacerb vs anxiety   Also pt reported  sudden loss of vision in the right eye and loss. Neurology consulted, recommended further workup with MRI brain   to evaluate for  ischemic infarct. Pt seen at bedside, appears very anxious, co persistent difficulties breathing despite duoneb treatment and oxygen.     11/12-Pt. seen and examined, noted anxious with periods of crying and clenching her fists to her chest stating she worries about her condition and scared of dying.  Explanations and emotional support provided.  As per pt.'s spouse, pt. has become progressively more paranoid and difficult to manage for the past month.  Her OP Psychiatrist has retired and spouse is asking for psych eval.  Psych consult requested, will f/u reccs.  Pt. is currently with no s&s of COPD exacerbation
68 yr old female with Hx of COPD,HTN,Lipid d/o,Anxiety,Hypothyroidism here with sob,vit b12 and d def.  1.COPD-nebs.  2.HTN-cont np medication.  3.Lipid d/o-statin.  4.Hypothyroidism-synthroid.  5.Replace vit b12 and d.  6.Lipid d/o-statin.  7.Anxiety-Psych eval.  8.GI and DVT prophylaxis.
Transient visual loss in the right eye in patient with normal esr but elevated CRP with normal MRI brain should consider temporal arteritis.  Recommend right temporal artery biopsy.    Left distal hand weakness in patient who has problems noted on MRI of the C spine (details unavailable), concerning to the weakness being due to the cervical disease, cord lesion or entrapment neuropathy.  Recommend that the patient comes to fu with me next week at 95-25 for [email or web address] and bring copy of her prior MRI C spine imaging and report; as well as fu with neurosurgery.    katy NP

## 2019-11-14 NOTE — PROGRESS NOTE BEHAVIORAL HEALTH - NSBHFUPINTERVALHXFT_PSY_A_CORE
Pt reassessed today in anticipation of discharge to subacute rehab. She tells me that she continues to experience severe sudden "asthma attacks" in the middle of the night and some times during the day. She describes that she wakes up feeling that she is dying and describes chest pain/pressure/tightness with radiation to her back. She feels as though her ribs are tightening around her stomach and sometimes also feels a bit shaky and sweaty. She denies feeling lightheaded, dizzy, or nauseous during these spells and also denies any heart racing or palpitation symptoms. There is sometimes numbness/burning in her hands and feet but she can not tell me if they occur independently of or in conjunction with her perceived "asthma attacks."     Patient tells me that she does get panic attacks and these feel very different. They remind her more of when her asthma is exacerbated and she also feels nebulizer treatments help. She tells me that the worst of these attacks happen either in the middle of the night or during the day particularly when she is eating or taking her medications. She continues to believe people are purposefully giving her wrong medications and surrounding her with items she is allergic to and believes the hospital staff are doing this too. She tells me today she was offered an oxycodone pill but she knows it wasn't oxycodone. Oddly enough, she tells me she takes it anyway because she doesn't want to cause any problems. She continues to deny any SI, HI, AVH and does not believe she is paranoid. She tells me "I'm neurotic not psychotic" and admits to having extreme fearfulness at times, but still believes the basis of her fears are legitimate.     We reviewed her medical history extensively to get to the source of her symptoms as she tells me that though she does get anxious and panic, she feels there is something physically wrong that triggers her anxiety to increase. On review, pertinent history includes history of obstructive sleep apnea but she tells me she has not had a CPAP machine for years because it was discontinued due to non-compliance. She felt claustrophobic with the previous machine she had. She also tells me she has a long list of blood work for allergy testing she was supposed to do a while ago but has not gotten to it yet. Finally, she relays that approximately 2 years ago she had an EGD and was found to have an ulcer at that time which was subsequently treated. She recalls having "one of these asthma attacks" immediately following the EGD.

## 2019-11-14 NOTE — PROGRESS NOTE BEHAVIORAL HEALTH - NSBHFUPDXUPDATEFT_PSY_A_CORE
Pt presentation on recurrent assessment more consistent with paranoid delusional disorder and anxiety spectrum illness without overt symptoms of delirium at present. Her mental status has not fluctuated during the course of this admission and she remains insistent that people are bringing intentional harm to her despite no evidence or rationale to support this.

## 2019-11-14 NOTE — PROGRESS NOTE BEHAVIORAL HEALTH - NSBHCHARTREVIEWIMAGING_PSY_A_CORE FT
< from: Upper Endoscopy (05.18.17 @ 12:53) >    Findings:       No gross lesions were noted in the entire esophagus.       Diffuse moderate inflammation was found in the gastric body and in the gastric antrum.        Biopsies were taken with a cold forceps for Helicobacter pylori testing.       Few non-bleeding superficial gastric ulcers with no stigmata of bleeding were found in the        gastric antrum.       Localized moderate inflammation was found in the duodenal bulb and in the first part of the        duodenum.                                                                                                        Impression:   - No gross lesions in esophagus.                       - Gastritis. Biopsied.                       - Non-bleeding gastric ulcers with no stigmata of bleeding.                       - Duodenitis.  Recommendation:      - Await pathology results. PPI bid; Carafate tid;    < end of copied text >

## 2019-11-14 NOTE — PROGRESS NOTE BEHAVIORAL HEALTH - NSBHCONSULTMEDANXIETY_PSY_A_CORE FT
Add Zyprexa 2.5-5 mg BID PRN severe anxiety to her medication regimen as an additional option to target anxiety associated with her paranoia  - patient is able to refuse this medication if she doesn't desire it but it can still be offered when acutely panicked.

## 2019-11-14 NOTE — PROGRESS NOTE BEHAVIORAL HEALTH - NSBHCHARTREVIEWLAB_PSY_A_CORE FT
11-14    141  |  105  |  11  ----------------------------<  77  3.8   |  31  |  0.69    Ca    9.8      14 Nov 2019 06:47  Phos  2.4     11-13  Mg     2.2     11-13    Complete Blood Count in AM (11.14.19 @ 06:47)    Nucleated RBC: 0 /100 WBCs    WBC Count: 9.44 K/uL    RBC Count: 3.97 M/uL    Hemoglobin: 11.0 g/dL    Hematocrit: 36.0 %    Mean Cell Volume: 90.7 fl    Mean Cell Hemoglobin: 27.7 pg    Mean Cell Hemoglobin Conc: 30.6 gm/dL    Red Cell Distrib Width: 16.8 %    Platelet Count - Automated: 146 K/uL

## 2019-11-14 NOTE — PROGRESS NOTE BEHAVIORAL HEALTH - ORIENTATION OTHER
Impaired orientation/insight to the situation Fair orientation to the situation with absent insight regarding delusional content

## 2019-11-14 NOTE — PROGRESS NOTE BEHAVIORAL HEALTH - OTHER
Mostly cooperative but hostile towards psychiatric intervention not observed Paranoid ideation evidenced without any behavioral response to said paranoid unable to formally assess Limited judgement due to poor insight Mostly cooperative remains hostile towards psychiatric intervention Paranoid ideation evidenced without any behavioral response to said paranoia Limited judgement due to poor insight, likely at most recent baseline

## 2019-11-14 NOTE — PROGRESS NOTE BEHAVIORAL HEALTH - NSBHCHARTREVIEWVS_PSY_A_CORE FT
Vital Signs Last 24 Hrs  T(C): 36.7 (14 Nov 2019 05:13), Max: 36.7 (14 Nov 2019 05:13)  T(F): 98 (14 Nov 2019 05:13), Max: 98 (14 Nov 2019 05:13)  HR: 55 (14 Nov 2019 05:50) (55 - 86)  BP: 146/65 (14 Nov 2019 05:13) (144/70 - 146/65)  BP(mean): --  RR: 17 (14 Nov 2019 05:13) (16 - 17)  SpO2: 100% (14 Nov 2019 05:13) (96% - 100%)

## 2019-11-19 ENCOUNTER — EMERGENCY (EMERGENCY)
Facility: HOSPITAL | Age: 69
LOS: 1 days | Discharge: HOME | End: 2019-11-19
Attending: EMERGENCY MEDICINE
Payer: MEDICARE

## 2019-11-19 VITALS
SYSTOLIC BLOOD PRESSURE: 127 MMHG | HEART RATE: 89 BPM | TEMPERATURE: 98 F | RESPIRATION RATE: 18 BRPM | OXYGEN SATURATION: 96 % | DIASTOLIC BLOOD PRESSURE: 61 MMHG

## 2019-11-19 VITALS
HEART RATE: 75 BPM | OXYGEN SATURATION: 97 % | RESPIRATION RATE: 18 BRPM | SYSTOLIC BLOOD PRESSURE: 132 MMHG | DIASTOLIC BLOOD PRESSURE: 74 MMHG | TEMPERATURE: 99 F

## 2019-11-19 DIAGNOSIS — Z98.89 OTHER SPECIFIED POSTPROCEDURAL STATES: Chronic | ICD-10-CM

## 2019-11-19 DIAGNOSIS — Z90.5 ACQUIRED ABSENCE OF KIDNEY: Chronic | ICD-10-CM

## 2019-11-19 DIAGNOSIS — Z90.49 ACQUIRED ABSENCE OF OTHER SPECIFIED PARTS OF DIGESTIVE TRACT: Chronic | ICD-10-CM

## 2019-11-19 LAB
ALBUMIN SERPL ELPH-MCNC: 3.3 G/DL — SIGNIFICANT CHANGE UP (ref 3.3–5)
ALP SERPL-CCNC: 75 U/L — SIGNIFICANT CHANGE UP (ref 40–120)
ALT FLD-CCNC: 10 U/L — SIGNIFICANT CHANGE UP (ref 10–45)
ANION GAP SERPL CALC-SCNC: 11 MMOL/L — SIGNIFICANT CHANGE UP (ref 5–17)
AST SERPL-CCNC: 8 U/L — LOW (ref 10–40)
BASOPHILS # BLD AUTO: 0.01 K/UL — SIGNIFICANT CHANGE UP (ref 0–0.2)
BASOPHILS NFR BLD AUTO: 0.1 % — SIGNIFICANT CHANGE UP (ref 0–2)
BILIRUB SERPL-MCNC: 1 MG/DL — SIGNIFICANT CHANGE UP (ref 0.2–1.2)
BUN SERPL-MCNC: 7 MG/DL — SIGNIFICANT CHANGE UP (ref 7–23)
CALCIUM SERPL-MCNC: 9.3 MG/DL — SIGNIFICANT CHANGE UP (ref 8.4–10.5)
CHLORIDE SERPL-SCNC: 105 MMOL/L — SIGNIFICANT CHANGE UP (ref 96–108)
CO2 SERPL-SCNC: 26 MMOL/L — SIGNIFICANT CHANGE UP (ref 22–31)
CREAT SERPL-MCNC: 0.59 MG/DL — SIGNIFICANT CHANGE UP (ref 0.5–1.3)
EOSINOPHIL # BLD AUTO: 0.17 K/UL — SIGNIFICANT CHANGE UP (ref 0–0.5)
EOSINOPHIL NFR BLD AUTO: 1.7 % — SIGNIFICANT CHANGE UP (ref 0–6)
GLUCOSE SERPL-MCNC: 79 MG/DL — SIGNIFICANT CHANGE UP (ref 70–99)
HCT VFR BLD CALC: 32.3 % — LOW (ref 34.5–45)
HGB BLD-MCNC: 9.8 G/DL — LOW (ref 11.5–15.5)
IMM GRANULOCYTES NFR BLD AUTO: 0.5 % — SIGNIFICANT CHANGE UP (ref 0–1.5)
LYMPHOCYTES # BLD AUTO: 1.79 K/UL — SIGNIFICANT CHANGE UP (ref 1–3.3)
LYMPHOCYTES # BLD AUTO: 17.9 % — SIGNIFICANT CHANGE UP (ref 13–44)
MCHC RBC-ENTMCNC: 27.7 PG — SIGNIFICANT CHANGE UP (ref 27–34)
MCHC RBC-ENTMCNC: 30.3 GM/DL — LOW (ref 32–36)
MCV RBC AUTO: 91.2 FL — SIGNIFICANT CHANGE UP (ref 80–100)
MONOCYTES # BLD AUTO: 0.66 K/UL — SIGNIFICANT CHANGE UP (ref 0–0.9)
MONOCYTES NFR BLD AUTO: 6.6 % — SIGNIFICANT CHANGE UP (ref 2–14)
NEUTROPHILS # BLD AUTO: 7.31 K/UL — SIGNIFICANT CHANGE UP (ref 1.8–7.4)
NEUTROPHILS NFR BLD AUTO: 73.2 % — SIGNIFICANT CHANGE UP (ref 43–77)
NRBC # BLD: 0 /100 WBCS — SIGNIFICANT CHANGE UP (ref 0–0)
PLATELET # BLD AUTO: 146 K/UL — LOW (ref 150–400)
POTASSIUM SERPL-MCNC: 3.8 MMOL/L — SIGNIFICANT CHANGE UP (ref 3.5–5.3)
POTASSIUM SERPL-SCNC: 3.8 MMOL/L — SIGNIFICANT CHANGE UP (ref 3.5–5.3)
PROT SERPL-MCNC: 5.9 G/DL — LOW (ref 6–8.3)
RBC # BLD: 3.54 M/UL — LOW (ref 3.8–5.2)
RBC # FLD: 17.4 % — HIGH (ref 10.3–14.5)
SODIUM SERPL-SCNC: 142 MMOL/L — SIGNIFICANT CHANGE UP (ref 135–145)
WBC # BLD: 9.99 K/UL — SIGNIFICANT CHANGE UP (ref 3.8–10.5)
WBC # FLD AUTO: 9.99 K/UL — SIGNIFICANT CHANGE UP (ref 3.8–10.5)

## 2019-11-19 PROCEDURE — 85027 COMPLETE CBC AUTOMATED: CPT

## 2019-11-19 PROCEDURE — 96365 THER/PROPH/DIAG IV INF INIT: CPT

## 2019-11-19 PROCEDURE — 99284 EMERGENCY DEPT VISIT MOD MDM: CPT | Mod: 25

## 2019-11-19 PROCEDURE — 90715 TDAP VACCINE 7 YRS/> IM: CPT

## 2019-11-19 PROCEDURE — 80053 COMPREHEN METABOLIC PANEL: CPT

## 2019-11-19 PROCEDURE — 94640 AIRWAY INHALATION TREATMENT: CPT

## 2019-11-19 PROCEDURE — 99284 EMERGENCY DEPT VISIT MOD MDM: CPT | Mod: GC

## 2019-11-19 PROCEDURE — 90471 IMMUNIZATION ADMIN: CPT

## 2019-11-19 RX ORDER — TETANUS TOXOID, REDUCED DIPHTHERIA TOXOID AND ACELLULAR PERTUSSIS VACCINE, ADSORBED 5; 2.5; 8; 8; 2.5 [IU]/.5ML; [IU]/.5ML; UG/.5ML; UG/.5ML; UG/.5ML
0.5 SUSPENSION INTRAMUSCULAR ONCE
Refills: 0 | Status: COMPLETED | OUTPATIENT
Start: 2019-11-19 | End: 2019-11-19

## 2019-11-19 RX ORDER — IPRATROPIUM/ALBUTEROL SULFATE 18-103MCG
3 AEROSOL WITH ADAPTER (GRAM) INHALATION ONCE
Refills: 0 | Status: COMPLETED | OUTPATIENT
Start: 2019-11-19 | End: 2019-11-19

## 2019-11-19 RX ORDER — CEPHALEXIN 500 MG
1 CAPSULE ORAL
Qty: 14 | Refills: 0
Start: 2019-11-19 | End: 2019-11-25

## 2019-11-19 RX ORDER — OXYCODONE HYDROCHLORIDE 5 MG/1
5 TABLET ORAL ONCE
Refills: 0 | Status: DISCONTINUED | OUTPATIENT
Start: 2019-11-19 | End: 2019-11-19

## 2019-11-19 RX ORDER — CEFAZOLIN SODIUM 1 G
1000 VIAL (EA) INJECTION ONCE
Refills: 0 | Status: COMPLETED | OUTPATIENT
Start: 2019-11-19 | End: 2019-11-19

## 2019-11-19 RX ADMIN — OXYCODONE HYDROCHLORIDE 5 MILLIGRAM(S): 5 TABLET ORAL at 17:30

## 2019-11-19 RX ADMIN — Medication 3 MILLILITER(S): at 16:31

## 2019-11-19 RX ADMIN — TETANUS TOXOID, REDUCED DIPHTHERIA TOXOID AND ACELLULAR PERTUSSIS VACCINE, ADSORBED 0.5 MILLILITER(S): 5; 2.5; 8; 8; 2.5 SUSPENSION INTRAMUSCULAR at 16:31

## 2019-11-19 RX ADMIN — Medication 1000 MILLIGRAM(S): at 17:00

## 2019-11-19 RX ADMIN — OXYCODONE HYDROCHLORIDE 5 MILLIGRAM(S): 5 TABLET ORAL at 17:01

## 2019-11-19 RX ADMIN — Medication 100 MILLIGRAM(S): at 16:31

## 2019-11-19 RX ADMIN — Medication 3 MILLILITER(S): at 17:37

## 2019-11-19 NOTE — ED ADULT NURSE REASSESSMENT NOTE - NS ED NURSE REASSESS COMMENT FT1
Pt discharged back to rehab.  states he will drive patient back to rehab. Pt on 2L O2 nasal cannula at baseline. Pt reports she normally goes without O2 during travel and is aware of the risks but states she wants her  to drive her instead of ambulette transfer back. Pt discharged and instructed to follow up with PMD. Prescribed cephalin and instructed on use. Left shin skin clear cleaned and bandaged. Pt brought to discharge area in wheelchair.

## 2019-11-19 NOTE — ED PROVIDER NOTE - CLINICAL SUMMARY MEDICAL DECISION MAKING FREE TEXT BOX
Patient with two day old laceration to shin, now not amenable to closure due to time out from injury.  No evidence of severe skin infection/necrotizing infection on exam, and no evidence of sepsis.  Plan for screening labs, IV ancef x1 for possible cellulitis, update tDap, if labs unremarkable discharge back to rehab facility for daily wound care and PO antibiotics.  Discussed plan with both patient, patient family and PMD who are in agreement.

## 2019-11-19 NOTE — ED ADULT NURSE REASSESSMENT NOTE - NS ED NURSE REASSESS COMMENT FT1
Bloodwork drawn and sent to lab. Tetanus and antibiotics administered. Pt c/o asthma exacerbation, duoneb administered. Lungs clear.

## 2019-11-19 NOTE — ED ADULT NURSE NOTE - OBJECTIVE STATEMENT
68y female brought in by EMS from rehab c/o left leg laceration. A&Ox3 and VSS with  at bedside. Hx of HTN, HLD, COPD, Graves Disease, breast cancer, kidney cancer, asthma and cellulitis. Pt sustain left shin laceration s/p banging left leg against wheelchair leg 2 days ago. Pt reports increased swelling and redness since injury. Pt now c/o 8/10 pain. Denies fever/chills. Denies chest pain, sob, n/v/d. Denies abdominal pain and urinary symptoms. Upon exam, respirations even and unlabored, lungs clear. Pt on 2L O2 nasal cannula at baseline. Abdomen soft, nontender. B/l lower extremity swelling. Mid-anterior left shin noted to have 1 inch vertical laceration draining serosanguinous fluid. Left shin appears red, swollen and is warm to touch. 68y female brought in by EMS from rehab c/o left leg wound. A&Ox3 and VSS with  at bedside. Hx of HTN, HLD, COPD, Graves Disease, breast cancer, kidney cancer, asthma and cellulitis. Pt sustain left shin skin tear s/p banging left leg against wheelchair leg 2 days ago. Pt reports increased swelling and redness since injury. Pt now c/o 8/10 pain. Denies fever/chills. Denies chest pain, sob, n/v/d. Denies abdominal pain and urinary symptoms. Upon exam, respirations even and unlabored, lungs clear. Pt on 2L O2 nasal cannula at baseline. Abdomen soft, nontender. B/l lower extremity swelling. Mid-anterior left shin noted to have 1 inch vertical skin tear draining serosanguinous fluid. Left shin appears red, swollen and is warm to touch.

## 2019-11-19 NOTE — ED PROVIDER NOTE - OBJECTIVE STATEMENT
Patient currently in rehab facility after recent admission at [facility].  While at rehab facility two days ago fell and cut anterior L shin on wheelchair footrest.  Staff was able to catch her, she did not hit ground or strike head.  Followed up today with her PMD (SHON Salcedo) who sent her to Emergency Department for evaluation.  Unknown last tetanus.  Reporting pain in area which seems similar to prior episodes of cellulitis, however both legs are swollen and erythematous at baseline per patient and family.  Denying chest pains, no change in chronic respiratory issues, no abdominal pains.  Reporting ongoing ear/throat pains for 2+ weeks, PMD evaluated this without noted source on exam.

## 2019-11-19 NOTE — ED PROVIDER NOTE - CARDIAC, MLM
Normal rate, regular rhythm.  Heart sounds S1, S2.  No murmurs, rubs or gallops. Normal rate, regular rhythm.  Heart sounds S1, S2.  Bilateral pitting edema

## 2019-11-19 NOTE — ED PROVIDER NOTE - ATTENDING CONTRIBUTION TO CARE
Patient seen and evaluated with resident/NP/PA, however HPI, ROS, PE and MDM as documented authored by myself unless otherwise noted- Luacs Umana MD

## 2019-11-19 NOTE — ED PROVIDER NOTE - NSFOLLOWUPINSTRUCTIONS_ED_ALL_ED_FT
You were seen in the Emergency Department for cellulitis of the left lower leg.   1) Advance activity as tolerated.    2) Continue all previously prescribed medications as directed.   your prescription for cephalexin and take one tablet every 12 hours for 7 days.  3) Follow up with your primary care physician in 24-48 hours - take copies of your results.    4) Return to the Emergency Department for worsening or persistent symptoms, and/or ANY NEW OR CONCERNING SYMPTOMS. If you have issues obtaining follow up, please call: [phone] to obtain a doctor or specialist who takes your insurance in your area.

## 2019-11-19 NOTE — ED PROVIDER NOTE - PATIENT PORTAL LINK FT
You can access the FollowMyHealth Patient Portal offered by [facility] by registering at the following website: [email or web address]. By joining [employer]’s FollowMyHealth portal, you will also be able to view your health information using other applications (apps) compatible with our system.

## 2019-11-19 NOTE — ED PROVIDER NOTE - SKIN, MLM
Skin normal color for race, warm, dry.  Approx 2.5cm laceration to L shin with underlying fat exposed, no active bleeding, surrounding bruising/erythema, no palpable crepitus

## 2019-11-21 RX ORDER — LURASIDONE HYDROCHLORIDE 40 MG/1
1 TABLET ORAL
Qty: 7 | Refills: 0
Start: 2019-11-21 | End: 2019-11-27

## 2019-11-28 RX ORDER — LURASIDONE HYDROCHLORIDE 40 MG/1
1 TABLET ORAL
Qty: 30 | Refills: 0
Start: 2019-11-28 | End: 2019-12-27

## 2019-12-03 ENCOUNTER — APPOINTMENT (OUTPATIENT)
Dept: SPINE | Facility: CLINIC | Age: 69
End: 2019-12-03
Payer: MEDICARE

## 2019-12-03 VITALS
BODY MASS INDEX: 35.34 KG/M2 | OXYGEN SATURATION: 96 % | HEART RATE: 71 BPM | HEIGHT: 60 IN | SYSTOLIC BLOOD PRESSURE: 168 MMHG | DIASTOLIC BLOOD PRESSURE: 72 MMHG | RESPIRATION RATE: 22 BRPM | WEIGHT: 180 LBS

## 2019-12-03 DIAGNOSIS — G96.19 OTHER DISORDERS OF MENINGES, NOT ELSEWHERE CLASSIFIED: ICD-10-CM

## 2019-12-03 DIAGNOSIS — Z78.9 OTHER SPECIFIED HEALTH STATUS: ICD-10-CM

## 2019-12-03 PROCEDURE — 99204 OFFICE O/P NEW MOD 45 MIN: CPT

## 2019-12-14 ENCOUNTER — INPATIENT (INPATIENT)
Facility: HOSPITAL | Age: 69
LOS: 11 days | Discharge: PSYCHIATRIC OR SUBSTANCE USE FACILITY | DRG: 603 | End: 2019-12-26
Attending: GENERAL PRACTICE | Admitting: GENERAL PRACTICE
Payer: MEDICARE

## 2019-12-14 VITALS
TEMPERATURE: 98 F | HEART RATE: 88 BPM | DIASTOLIC BLOOD PRESSURE: 88 MMHG | SYSTOLIC BLOOD PRESSURE: 144 MMHG | OXYGEN SATURATION: 96 % | RESPIRATION RATE: 16 BRPM

## 2019-12-14 DIAGNOSIS — Z98.89 OTHER SPECIFIED POSTPROCEDURAL STATES: Chronic | ICD-10-CM

## 2019-12-14 DIAGNOSIS — Z90.49 ACQUIRED ABSENCE OF OTHER SPECIFIED PARTS OF DIGESTIVE TRACT: Chronic | ICD-10-CM

## 2019-12-14 DIAGNOSIS — Z90.5 ACQUIRED ABSENCE OF KIDNEY: Chronic | ICD-10-CM

## 2019-12-14 PROCEDURE — 73590 X-RAY EXAM OF LOWER LEG: CPT | Mod: 26,50

## 2019-12-14 PROCEDURE — 99285 EMERGENCY DEPT VISIT HI MDM: CPT | Mod: GC

## 2019-12-14 PROCEDURE — 71045 X-RAY EXAM CHEST 1 VIEW: CPT | Mod: 26

## 2019-12-14 RX ORDER — VANCOMYCIN HCL 1 G
1000 VIAL (EA) INTRAVENOUS ONCE
Refills: 0 | Status: COMPLETED | OUTPATIENT
Start: 2019-12-14 | End: 2019-12-14

## 2019-12-15 DIAGNOSIS — R09.89 OTHER SPECIFIED SYMPTOMS AND SIGNS INVOLVING THE CIRCULATORY AND RESPIRATORY SYSTEMS: ICD-10-CM

## 2019-12-15 DIAGNOSIS — F41.9 ANXIETY DISORDER, UNSPECIFIED: ICD-10-CM

## 2019-12-15 DIAGNOSIS — L03.119 CELLULITIS OF UNSPECIFIED PART OF LIMB: ICD-10-CM

## 2019-12-15 DIAGNOSIS — I10 ESSENTIAL (PRIMARY) HYPERTENSION: ICD-10-CM

## 2019-12-15 DIAGNOSIS — R53.81 OTHER MALAISE: ICD-10-CM

## 2019-12-15 DIAGNOSIS — F39 UNSPECIFIED MOOD [AFFECTIVE] DISORDER: ICD-10-CM

## 2019-12-15 DIAGNOSIS — Z29.9 ENCOUNTER FOR PROPHYLACTIC MEASURES, UNSPECIFIED: ICD-10-CM

## 2019-12-15 DIAGNOSIS — F22 DELUSIONAL DISORDERS: ICD-10-CM

## 2019-12-15 DIAGNOSIS — N39.0 URINARY TRACT INFECTION, SITE NOT SPECIFIED: ICD-10-CM

## 2019-12-15 DIAGNOSIS — G89.29 OTHER CHRONIC PAIN: ICD-10-CM

## 2019-12-15 LAB
ALBUMIN SERPL ELPH-MCNC: 3.2 G/DL — LOW (ref 3.3–5)
ALBUMIN SERPL ELPH-MCNC: 3.5 G/DL — SIGNIFICANT CHANGE UP (ref 3.3–5)
ALBUMIN SERPL ELPH-MCNC: 3.7 G/DL — SIGNIFICANT CHANGE UP (ref 3.3–5)
ALP SERPL-CCNC: 58 U/L — SIGNIFICANT CHANGE UP (ref 40–120)
ALP SERPL-CCNC: 62 U/L — SIGNIFICANT CHANGE UP (ref 40–120)
ALP SERPL-CCNC: 67 U/L — SIGNIFICANT CHANGE UP (ref 40–120)
ALT FLD-CCNC: 23 U/L — SIGNIFICANT CHANGE UP (ref 10–45)
ALT FLD-CCNC: 47 U/L — HIGH (ref 10–45)
ALT FLD-CCNC: 49 U/L — HIGH (ref 10–45)
ANION GAP SERPL CALC-SCNC: 10 MMOL/L — SIGNIFICANT CHANGE UP (ref 5–17)
ANION GAP SERPL CALC-SCNC: 11 MMOL/L — SIGNIFICANT CHANGE UP (ref 5–17)
ANION GAP SERPL CALC-SCNC: 15 MMOL/L — SIGNIFICANT CHANGE UP (ref 5–17)
APPEARANCE UR: CLEAR — SIGNIFICANT CHANGE UP
APTT BLD: 22.6 SEC — LOW (ref 27.5–36.3)
AST SERPL-CCNC: 10 U/L — SIGNIFICANT CHANGE UP (ref 10–40)
AST SERPL-CCNC: 29 U/L — SIGNIFICANT CHANGE UP (ref 10–40)
AST SERPL-CCNC: 35 U/L — SIGNIFICANT CHANGE UP (ref 10–40)
BASOPHILS # BLD AUTO: 0 K/UL — SIGNIFICANT CHANGE UP (ref 0–0.2)
BASOPHILS # BLD AUTO: 0 K/UL — SIGNIFICANT CHANGE UP (ref 0–0.2)
BASOPHILS NFR BLD AUTO: 0 % — SIGNIFICANT CHANGE UP (ref 0–2)
BASOPHILS NFR BLD AUTO: 0 % — SIGNIFICANT CHANGE UP (ref 0–2)
BILIRUB SERPL-MCNC: 0.5 MG/DL — SIGNIFICANT CHANGE UP (ref 0.2–1.2)
BILIRUB SERPL-MCNC: 0.6 MG/DL — SIGNIFICANT CHANGE UP (ref 0.2–1.2)
BILIRUB SERPL-MCNC: 0.7 MG/DL — SIGNIFICANT CHANGE UP (ref 0.2–1.2)
BILIRUB UR-MCNC: NEGATIVE — SIGNIFICANT CHANGE UP
BUN SERPL-MCNC: 21 MG/DL — SIGNIFICANT CHANGE UP (ref 7–23)
BUN SERPL-MCNC: 22 MG/DL — SIGNIFICANT CHANGE UP (ref 7–23)
BUN SERPL-MCNC: 32 MG/DL — HIGH (ref 7–23)
CALCIUM SERPL-MCNC: 8.4 MG/DL — SIGNIFICANT CHANGE UP (ref 8.4–10.5)
CALCIUM SERPL-MCNC: 8.5 MG/DL — SIGNIFICANT CHANGE UP (ref 8.4–10.5)
CALCIUM SERPL-MCNC: 9.6 MG/DL — SIGNIFICANT CHANGE UP (ref 8.4–10.5)
CHLORIDE SERPL-SCNC: 100 MMOL/L — SIGNIFICANT CHANGE UP (ref 96–108)
CHLORIDE SERPL-SCNC: 106 MMOL/L — SIGNIFICANT CHANGE UP (ref 96–108)
CHLORIDE SERPL-SCNC: 107 MMOL/L — SIGNIFICANT CHANGE UP (ref 96–108)
CK SERPL-CCNC: 42 U/L — SIGNIFICANT CHANGE UP (ref 25–170)
CO2 SERPL-SCNC: 27 MMOL/L — SIGNIFICANT CHANGE UP (ref 22–31)
COLOR SPEC: YELLOW — SIGNIFICANT CHANGE UP
CREAT SERPL-MCNC: 0.51 MG/DL — SIGNIFICANT CHANGE UP (ref 0.5–1.3)
CREAT SERPL-MCNC: 0.52 MG/DL — SIGNIFICANT CHANGE UP (ref 0.5–1.3)
CREAT SERPL-MCNC: 0.67 MG/DL — SIGNIFICANT CHANGE UP (ref 0.5–1.3)
CRP SERPL-MCNC: <0.1 MG/DL — SIGNIFICANT CHANGE UP (ref 0–0.4)
DIFF PNL FLD: NEGATIVE — SIGNIFICANT CHANGE UP
EOSINOPHIL # BLD AUTO: 0 K/UL — SIGNIFICANT CHANGE UP (ref 0–0.5)
EOSINOPHIL # BLD AUTO: 0 K/UL — SIGNIFICANT CHANGE UP (ref 0–0.5)
EOSINOPHIL NFR BLD AUTO: 0 % — SIGNIFICANT CHANGE UP (ref 0–6)
EOSINOPHIL NFR BLD AUTO: 0 % — SIGNIFICANT CHANGE UP (ref 0–6)
ERYTHROCYTE [SEDIMENTATION RATE] IN BLOOD: 5 MM/HR — SIGNIFICANT CHANGE UP (ref 0–20)
GAS PNL BLDV: SIGNIFICANT CHANGE UP
GLUCOSE SERPL-MCNC: 212 MG/DL — HIGH (ref 70–99)
GLUCOSE SERPL-MCNC: 69 MG/DL — LOW (ref 70–99)
GLUCOSE SERPL-MCNC: 73 MG/DL — SIGNIFICANT CHANGE UP (ref 70–99)
GLUCOSE UR QL: ABNORMAL
HBA1C BLD-MCNC: 5.8 % — HIGH (ref 4–5.6)
HBA1C BLD-MCNC: 5.9 % — HIGH (ref 4–5.6)
HCT VFR BLD CALC: 35.1 % — SIGNIFICANT CHANGE UP (ref 34.5–45)
HCT VFR BLD CALC: 36.3 % — SIGNIFICANT CHANGE UP (ref 34.5–45)
HCT VFR BLD CALC: 37.2 % — SIGNIFICANT CHANGE UP (ref 34.5–45)
HGB BLD-MCNC: 10.7 G/DL — LOW (ref 11.5–15.5)
HGB BLD-MCNC: 11 G/DL — LOW (ref 11.5–15.5)
HGB BLD-MCNC: 11 G/DL — LOW (ref 11.5–15.5)
INR BLD: 0.94 RATIO — SIGNIFICANT CHANGE UP (ref 0.88–1.16)
KETONES UR-MCNC: NEGATIVE — SIGNIFICANT CHANGE UP
LACTATE BLDV-MCNC: 3 MMOL/L — HIGH (ref 0.7–2)
LACTATE SERPL-SCNC: 1.8 MMOL/L — SIGNIFICANT CHANGE UP (ref 0.7–2)
LACTATE SERPL-SCNC: 1.9 MMOL/L — SIGNIFICANT CHANGE UP (ref 0.7–2)
LEUKOCYTE ESTERASE UR-ACNC: NEGATIVE — SIGNIFICANT CHANGE UP
LYMPHOCYTES # BLD AUTO: 0.33 K/UL — LOW (ref 1–3.3)
LYMPHOCYTES # BLD AUTO: 1.66 K/UL — SIGNIFICANT CHANGE UP (ref 1–3.3)
LYMPHOCYTES # BLD AUTO: 1.8 % — LOW (ref 13–44)
LYMPHOCYTES # BLD AUTO: 7.8 % — LOW (ref 13–44)
MAGNESIUM SERPL-MCNC: 2.2 MG/DL — SIGNIFICANT CHANGE UP (ref 1.6–2.6)
MAGNESIUM SERPL-MCNC: 2.2 MG/DL — SIGNIFICANT CHANGE UP (ref 1.6–2.6)
MCHC RBC-ENTMCNC: 27 PG — SIGNIFICANT CHANGE UP (ref 27–34)
MCHC RBC-ENTMCNC: 27.8 PG — SIGNIFICANT CHANGE UP (ref 27–34)
MCHC RBC-ENTMCNC: 27.9 PG — SIGNIFICANT CHANGE UP (ref 27–34)
MCHC RBC-ENTMCNC: 29.6 GM/DL — LOW (ref 32–36)
MCHC RBC-ENTMCNC: 30.3 GM/DL — LOW (ref 32–36)
MCHC RBC-ENTMCNC: 30.5 GM/DL — LOW (ref 32–36)
MCV RBC AUTO: 91.2 FL — SIGNIFICANT CHANGE UP (ref 80–100)
MCV RBC AUTO: 91.4 FL — SIGNIFICANT CHANGE UP (ref 80–100)
MCV RBC AUTO: 91.9 FL — SIGNIFICANT CHANGE UP (ref 80–100)
MONOCYTES # BLD AUTO: 0.97 K/UL — HIGH (ref 0–0.9)
MONOCYTES # BLD AUTO: 1.11 K/UL — HIGH (ref 0–0.9)
MONOCYTES NFR BLD AUTO: 5.2 % — SIGNIFICANT CHANGE UP (ref 2–14)
MONOCYTES NFR BLD AUTO: 5.3 % — SIGNIFICANT CHANGE UP (ref 2–14)
NEUTROPHILS # BLD AUTO: 16.64 K/UL — HIGH (ref 1.8–7.4)
NEUTROPHILS # BLD AUTO: 18.36 K/UL — HIGH (ref 1.8–7.4)
NEUTROPHILS NFR BLD AUTO: 84.4 % — HIGH (ref 43–77)
NEUTROPHILS NFR BLD AUTO: 90.2 % — HIGH (ref 43–77)
NITRITE UR-MCNC: POSITIVE
NRBC # BLD: 0 /100 WBCS — SIGNIFICANT CHANGE UP (ref 0–0)
NT-PROBNP SERPL-SCNC: 386 PG/ML — HIGH (ref 0–300)
PH UR: 5.5 — SIGNIFICANT CHANGE UP (ref 5–8)
PHOSPHATE SERPL-MCNC: 2.2 MG/DL — LOW (ref 2.5–4.5)
PHOSPHATE SERPL-MCNC: 2.3 MG/DL — LOW (ref 2.5–4.5)
PLATELET # BLD AUTO: 159 K/UL — SIGNIFICANT CHANGE UP (ref 150–400)
PLATELET # BLD AUTO: 180 K/UL — SIGNIFICANT CHANGE UP (ref 150–400)
PLATELET # BLD AUTO: 203 K/UL — SIGNIFICANT CHANGE UP (ref 150–400)
POTASSIUM SERPL-MCNC: 3.5 MMOL/L — SIGNIFICANT CHANGE UP (ref 3.5–5.3)
POTASSIUM SERPL-MCNC: 3.7 MMOL/L — SIGNIFICANT CHANGE UP (ref 3.5–5.3)
POTASSIUM SERPL-MCNC: 4 MMOL/L — SIGNIFICANT CHANGE UP (ref 3.5–5.3)
POTASSIUM SERPL-SCNC: 3.5 MMOL/L — SIGNIFICANT CHANGE UP (ref 3.5–5.3)
POTASSIUM SERPL-SCNC: 3.7 MMOL/L — SIGNIFICANT CHANGE UP (ref 3.5–5.3)
POTASSIUM SERPL-SCNC: 4 MMOL/L — SIGNIFICANT CHANGE UP (ref 3.5–5.3)
PROT SERPL-MCNC: 5.4 G/DL — LOW (ref 6–8.3)
PROT SERPL-MCNC: 5.6 G/DL — LOW (ref 6–8.3)
PROT SERPL-MCNC: 6 G/DL — SIGNIFICANT CHANGE UP (ref 6–8.3)
PROT UR-MCNC: ABNORMAL
PROTHROM AB SERPL-ACNC: 10.8 SEC — SIGNIFICANT CHANGE UP (ref 10–12.9)
RBC # BLD: 3.84 M/UL — SIGNIFICANT CHANGE UP (ref 3.8–5.2)
RBC # BLD: 3.95 M/UL — SIGNIFICANT CHANGE UP (ref 3.8–5.2)
RBC # BLD: 4.08 M/UL — SIGNIFICANT CHANGE UP (ref 3.8–5.2)
RBC # FLD: 16.7 % — HIGH (ref 10.3–14.5)
SODIUM SERPL-SCNC: 142 MMOL/L — SIGNIFICANT CHANGE UP (ref 135–145)
SODIUM SERPL-SCNC: 143 MMOL/L — SIGNIFICANT CHANGE UP (ref 135–145)
SODIUM SERPL-SCNC: 145 MMOL/L — SIGNIFICANT CHANGE UP (ref 135–145)
SP GR SPEC: 1.03 — HIGH (ref 1.01–1.02)
TROPONIN T, HIGH SENSITIVITY RESULT: 34 NG/L — SIGNIFICANT CHANGE UP (ref 0–51)
UROBILINOGEN FLD QL: NEGATIVE — SIGNIFICANT CHANGE UP
WBC # BLD: 18.27 K/UL — HIGH (ref 3.8–10.5)
WBC # BLD: 21.32 K/UL — HIGH (ref 3.8–10.5)
WBC # BLD: 24.76 K/UL — HIGH (ref 3.8–10.5)
WBC # FLD AUTO: 18.27 K/UL — HIGH (ref 3.8–10.5)
WBC # FLD AUTO: 21.32 K/UL — HIGH (ref 3.8–10.5)
WBC # FLD AUTO: 24.76 K/UL — HIGH (ref 3.8–10.5)

## 2019-12-15 PROCEDURE — 99223 1ST HOSP IP/OBS HIGH 75: CPT

## 2019-12-15 PROCEDURE — 70450 CT HEAD/BRAIN W/O DYE: CPT | Mod: 26

## 2019-12-15 PROCEDURE — 99222 1ST HOSP IP/OBS MODERATE 55: CPT

## 2019-12-15 RX ORDER — CLOPIDOGREL BISULFATE 75 MG/1
75 TABLET, FILM COATED ORAL DAILY
Refills: 0 | Status: DISCONTINUED | OUTPATIENT
Start: 2019-12-15 | End: 2019-12-26

## 2019-12-15 RX ORDER — AMLODIPINE BESYLATE 2.5 MG/1
2.5 TABLET ORAL ONCE
Refills: 0 | Status: COMPLETED | OUTPATIENT
Start: 2019-12-15 | End: 2019-12-15

## 2019-12-15 RX ORDER — LOSARTAN POTASSIUM 100 MG/1
100 TABLET, FILM COATED ORAL DAILY
Refills: 0 | Status: DISCONTINUED | OUTPATIENT
Start: 2019-12-16 | End: 2019-12-26

## 2019-12-15 RX ORDER — BACLOFEN 100 %
5 POWDER (GRAM) MISCELLANEOUS THREE TIMES A DAY
Refills: 0 | Status: DISCONTINUED | OUTPATIENT
Start: 2019-12-15 | End: 2019-12-26

## 2019-12-15 RX ORDER — ENOXAPARIN SODIUM 100 MG/ML
40 INJECTION SUBCUTANEOUS DAILY
Refills: 0 | Status: DISCONTINUED | OUTPATIENT
Start: 2019-12-15 | End: 2019-12-26

## 2019-12-15 RX ORDER — MONTELUKAST 4 MG/1
10 TABLET, CHEWABLE ORAL DAILY
Refills: 0 | Status: DISCONTINUED | OUTPATIENT
Start: 2019-12-15 | End: 2019-12-26

## 2019-12-15 RX ORDER — OLANZAPINE 15 MG/1
2.5 TABLET, FILM COATED ORAL
Refills: 0 | Status: DISCONTINUED | OUTPATIENT
Start: 2019-12-15 | End: 2019-12-15

## 2019-12-15 RX ORDER — LIDOCAINE 4 G/100G
1 CREAM TOPICAL DAILY
Refills: 0 | Status: DISCONTINUED | OUTPATIENT
Start: 2019-12-15 | End: 2019-12-26

## 2019-12-15 RX ORDER — LEVOTHYROXINE SODIUM 125 MCG
125 TABLET ORAL DAILY
Refills: 0 | Status: DISCONTINUED | OUTPATIENT
Start: 2019-12-15 | End: 2019-12-26

## 2019-12-15 RX ORDER — LURASIDONE HYDROCHLORIDE 40 MG/1
80 TABLET ORAL DAILY
Refills: 0 | Status: DISCONTINUED | OUTPATIENT
Start: 2019-12-15 | End: 2019-12-15

## 2019-12-15 RX ORDER — ALBUTEROL 90 UG/1
1 AEROSOL, METERED ORAL EVERY 4 HOURS
Refills: 0 | Status: DISCONTINUED | OUTPATIENT
Start: 2019-12-15 | End: 2019-12-26

## 2019-12-15 RX ORDER — IPRATROPIUM/ALBUTEROL SULFATE 18-103MCG
3 AEROSOL WITH ADAPTER (GRAM) INHALATION EVERY 6 HOURS
Refills: 0 | Status: DISCONTINUED | OUTPATIENT
Start: 2019-12-15 | End: 2019-12-26

## 2019-12-15 RX ORDER — LANOLIN ALCOHOL/MO/W.PET/CERES
3 CREAM (GRAM) TOPICAL AT BEDTIME
Refills: 0 | Status: DISCONTINUED | OUTPATIENT
Start: 2019-12-15 | End: 2019-12-26

## 2019-12-15 RX ORDER — GABAPENTIN 400 MG/1
100 CAPSULE ORAL THREE TIMES A DAY
Refills: 0 | Status: DISCONTINUED | OUTPATIENT
Start: 2019-12-15 | End: 2019-12-26

## 2019-12-15 RX ORDER — SODIUM CHLORIDE 9 MG/ML
500 INJECTION INTRAMUSCULAR; INTRAVENOUS; SUBCUTANEOUS ONCE
Refills: 0 | Status: COMPLETED | OUTPATIENT
Start: 2019-12-15 | End: 2019-12-15

## 2019-12-15 RX ORDER — VANCOMYCIN HCL 1 G
1000 VIAL (EA) INTRAVENOUS EVERY 12 HOURS
Refills: 0 | Status: DISCONTINUED | OUTPATIENT
Start: 2019-12-15 | End: 2019-12-16

## 2019-12-15 RX ORDER — AMLODIPINE BESYLATE 2.5 MG/1
2.5 TABLET ORAL DAILY
Refills: 0 | Status: DISCONTINUED | OUTPATIENT
Start: 2019-12-16 | End: 2019-12-26

## 2019-12-15 RX ORDER — PIPERACILLIN AND TAZOBACTAM 4; .5 G/20ML; G/20ML
3.38 INJECTION, POWDER, LYOPHILIZED, FOR SOLUTION INTRAVENOUS ONCE
Refills: 0 | Status: COMPLETED | OUTPATIENT
Start: 2019-12-15 | End: 2019-12-15

## 2019-12-15 RX ORDER — CHOLECALCIFEROL (VITAMIN D3) 125 MCG
2000 CAPSULE ORAL DAILY
Refills: 0 | Status: DISCONTINUED | OUTPATIENT
Start: 2019-12-15 | End: 2019-12-26

## 2019-12-15 RX ORDER — TRIHEXYPHENIDYL HCL 2 MG
2 TABLET ORAL
Refills: 0 | Status: DISCONTINUED | OUTPATIENT
Start: 2019-12-15 | End: 2019-12-26

## 2019-12-15 RX ORDER — SODIUM CHLORIDE 9 MG/ML
1000 INJECTION INTRAMUSCULAR; INTRAVENOUS; SUBCUTANEOUS ONCE
Refills: 0 | Status: COMPLETED | OUTPATIENT
Start: 2019-12-15 | End: 2019-12-15

## 2019-12-15 RX ORDER — PIPERACILLIN AND TAZOBACTAM 4; .5 G/20ML; G/20ML
3.38 INJECTION, POWDER, LYOPHILIZED, FOR SOLUTION INTRAVENOUS EVERY 8 HOURS
Refills: 0 | Status: DISCONTINUED | OUTPATIENT
Start: 2019-12-15 | End: 2019-12-16

## 2019-12-15 RX ORDER — ATORVASTATIN CALCIUM 80 MG/1
40 TABLET, FILM COATED ORAL AT BEDTIME
Refills: 0 | Status: DISCONTINUED | OUTPATIENT
Start: 2019-12-15 | End: 2019-12-26

## 2019-12-15 RX ORDER — PREGABALIN 225 MG/1
1000 CAPSULE ORAL DAILY
Refills: 0 | Status: DISCONTINUED | OUTPATIENT
Start: 2019-12-15 | End: 2019-12-26

## 2019-12-15 RX ORDER — OLANZAPINE 15 MG/1
5 TABLET, FILM COATED ORAL AT BEDTIME
Refills: 0 | Status: DISCONTINUED | OUTPATIENT
Start: 2019-12-15 | End: 2019-12-18

## 2019-12-15 RX ORDER — TIOTROPIUM BROMIDE 18 UG/1
1 CAPSULE ORAL; RESPIRATORY (INHALATION) DAILY
Refills: 0 | Status: DISCONTINUED | OUTPATIENT
Start: 2019-12-15 | End: 2019-12-26

## 2019-12-15 RX ORDER — PANTOPRAZOLE SODIUM 20 MG/1
40 TABLET, DELAYED RELEASE ORAL
Refills: 0 | Status: DISCONTINUED | OUTPATIENT
Start: 2019-12-15 | End: 2019-12-26

## 2019-12-15 RX ORDER — FLUTICASONE PROPIONATE 50 MCG
1 SPRAY, SUSPENSION NASAL
Refills: 0 | Status: DISCONTINUED | OUTPATIENT
Start: 2019-12-15 | End: 2019-12-26

## 2019-12-15 RX ORDER — LOSARTAN POTASSIUM 100 MG/1
100 TABLET, FILM COATED ORAL ONCE
Refills: 0 | Status: COMPLETED | OUTPATIENT
Start: 2019-12-15 | End: 2019-12-15

## 2019-12-15 RX ORDER — CITALOPRAM 10 MG/1
10 TABLET, FILM COATED ORAL DAILY
Refills: 0 | Status: DISCONTINUED | OUTPATIENT
Start: 2019-12-15 | End: 2019-12-18

## 2019-12-15 RX ORDER — OXYCODONE HYDROCHLORIDE 5 MG/1
5 TABLET ORAL EVERY 6 HOURS
Refills: 0 | Status: DISCONTINUED | OUTPATIENT
Start: 2019-12-15 | End: 2019-12-22

## 2019-12-15 RX ORDER — FOLIC ACID 0.8 MG
1 TABLET ORAL DAILY
Refills: 0 | Status: DISCONTINUED | OUTPATIENT
Start: 2019-12-15 | End: 2019-12-26

## 2019-12-15 RX ORDER — CITALOPRAM 10 MG/1
30 TABLET, FILM COATED ORAL DAILY
Refills: 0 | Status: DISCONTINUED | OUTPATIENT
Start: 2019-12-15 | End: 2019-12-15

## 2019-12-15 RX ADMIN — Medication 250 MILLIGRAM(S): at 11:33

## 2019-12-15 RX ADMIN — LIDOCAINE 1 PATCH: 4 CREAM TOPICAL at 11:38

## 2019-12-15 RX ADMIN — Medication 2 MILLIGRAM(S): at 12:58

## 2019-12-15 RX ADMIN — OXYCODONE HYDROCHLORIDE 5 MILLIGRAM(S): 5 TABLET ORAL at 13:25

## 2019-12-15 RX ADMIN — GABAPENTIN 100 MILLIGRAM(S): 400 CAPSULE ORAL at 14:09

## 2019-12-15 RX ADMIN — Medication 2000 UNIT(S): at 11:27

## 2019-12-15 RX ADMIN — Medication 1000 MILLIGRAM(S): at 00:50

## 2019-12-15 RX ADMIN — OLANZAPINE 2.5 MILLIGRAM(S): 15 TABLET, FILM COATED ORAL at 08:12

## 2019-12-15 RX ADMIN — Medication 0.5 MILLIGRAM(S): at 17:19

## 2019-12-15 RX ADMIN — Medication 5 MILLIGRAM(S): at 14:09

## 2019-12-15 RX ADMIN — GABAPENTIN 100 MILLIGRAM(S): 400 CAPSULE ORAL at 23:52

## 2019-12-15 RX ADMIN — LIDOCAINE 1 PATCH: 4 CREAM TOPICAL at 23:53

## 2019-12-15 RX ADMIN — Medication 250 MILLIGRAM(S): at 23:52

## 2019-12-15 RX ADMIN — OXYCODONE HYDROCHLORIDE 5 MILLIGRAM(S): 5 TABLET ORAL at 08:30

## 2019-12-15 RX ADMIN — SODIUM CHLORIDE 1000 MILLILITER(S): 9 INJECTION INTRAMUSCULAR; INTRAVENOUS; SUBCUTANEOUS at 05:37

## 2019-12-15 RX ADMIN — PANTOPRAZOLE SODIUM 40 MILLIGRAM(S): 20 TABLET, DELAYED RELEASE ORAL at 10:07

## 2019-12-15 RX ADMIN — OXYCODONE HYDROCHLORIDE 5 MILLIGRAM(S): 5 TABLET ORAL at 07:05

## 2019-12-15 RX ADMIN — PIPERACILLIN AND TAZOBACTAM 25 GRAM(S): 4; .5 INJECTION, POWDER, LYOPHILIZED, FOR SOLUTION INTRAVENOUS at 15:06

## 2019-12-15 RX ADMIN — Medication 1 MILLIGRAM(S): at 11:27

## 2019-12-15 RX ADMIN — Medication 0.5 MILLIGRAM(S): at 07:05

## 2019-12-15 RX ADMIN — AMLODIPINE BESYLATE 2.5 MILLIGRAM(S): 2.5 TABLET ORAL at 05:37

## 2019-12-15 RX ADMIN — Medication 3 MILLILITER(S): at 12:58

## 2019-12-15 RX ADMIN — OXYCODONE HYDROCHLORIDE 5 MILLIGRAM(S): 5 TABLET ORAL at 12:55

## 2019-12-15 RX ADMIN — Medication 3 MILLIGRAM(S): at 23:52

## 2019-12-15 RX ADMIN — LIDOCAINE 1 PATCH: 4 CREAM TOPICAL at 19:30

## 2019-12-15 RX ADMIN — CITALOPRAM 30 MILLIGRAM(S): 10 TABLET, FILM COATED ORAL at 11:27

## 2019-12-15 RX ADMIN — SODIUM CHLORIDE 1000 MILLILITER(S): 9 INJECTION INTRAMUSCULAR; INTRAVENOUS; SUBCUTANEOUS at 02:13

## 2019-12-15 RX ADMIN — LOSARTAN POTASSIUM 100 MILLIGRAM(S): 100 TABLET, FILM COATED ORAL at 05:36

## 2019-12-15 RX ADMIN — ATORVASTATIN CALCIUM 40 MILLIGRAM(S): 80 TABLET, FILM COATED ORAL at 23:53

## 2019-12-15 RX ADMIN — Medication 250 MILLIGRAM(S): at 00:35

## 2019-12-15 RX ADMIN — PREGABALIN 1000 MICROGRAM(S): 225 CAPSULE ORAL at 11:27

## 2019-12-15 RX ADMIN — MONTELUKAST 10 MILLIGRAM(S): 4 TABLET, CHEWABLE ORAL at 11:32

## 2019-12-15 RX ADMIN — PIPERACILLIN AND TAZOBACTAM 200 GRAM(S): 4; .5 INJECTION, POWDER, LYOPHILIZED, FOR SOLUTION INTRAVENOUS at 06:53

## 2019-12-15 RX ADMIN — PIPERACILLIN AND TAZOBACTAM 25 GRAM(S): 4; .5 INJECTION, POWDER, LYOPHILIZED, FOR SOLUTION INTRAVENOUS at 23:51

## 2019-12-15 RX ADMIN — Medication 2 MILLIGRAM(S): at 18:49

## 2019-12-15 NOTE — ED PROVIDER NOTE - ATTENDING CONTRIBUTION TO CARE
I performed a history and physical exam of the patient and discussed their management with the resident and /or advanced care provider. I reviewed the resident and /or ACP's note and agree with the documented findings and plan of care. My medical decison making and observations are found above.  Lungs clear. lt lower ext draining, serous fluid.
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
## 2019-12-15 NOTE — CHART NOTE - NSCHARTNOTEFT_GEN_A_CORE
Reference #: [id number]       Patient Name: Anna To YOB: 1950   Address: [address] [place] [address] Sex: Female         Rx Written    Rx Dispensed    Drug    Quantity    Days Supply    Prescriber Name              11/27/2019 11/27/2019 lorazepam 1 mg tablet  120 30 Denisse, Juan     11/27/2019 11/27/2019 oxycodone hcl 5 mg tablet  120 30 Denisse, Juan     10/17/2019 10/17/2019 lorazepam 0.5 mg tablet  120 30 [facility]     10/10/2019 10/11/2019 oxycodone hcl 5 mg tablet  120 30 Denisse, Juan     10/03/2019 10/06/2019 lorazepam 1 mg tablet  28 7 Venus Womack (PA)     09/11/2019 09/16/2019 oxycodone hcl 5 mg tablet  120 30 Denisse, Juan     09/11/2019 09/14/2019 lorazepam 1 mg tablet  120 30 Denisse, Juan     08/24/2019 08/24/2019 oxycodone hcl 5 mg tablet  120 30 Denisse, Juan     08/21/2019 08/22/2019 lorazepam 1 mg tablet  120 30 Denisse, Gorge Eason MD     08/01/2019 08/02/2019 oxycodone hcl 5 mg tablet  120 30 Denisse, Juan     07/26/2019 07/26/2019 lorazepam 1 mg tablet  120 30 Denisse, Juan     06/27/2019 07/14/2019 clonazepam 0.5 mg tablet  60 30 Gorge Salcedo MD     07/01/2019 07/02/2019 oxycodone hcl 5 mg tablet  120 30 Robert Reyes MD     06/17/2019 06/18/2019 clonazepam 0.5 mg tablet  120 30 Keerthi Paulson MD     06/17/2019 06/18/2019 oxycodone hcl 5 mg tablet  30 10 Keerthi Paulson MD     04/17/2019 04/18/2019 xtampza er 13.5 mg capsule  60 30 Robert Reyes MD     04/09/2019 04/10/2019 alprazolam 0.5 mg tablet  34 17 Shoshana Paulino MD     02/27/2019 03/04/2019 oxycodone hcl 5 mg tablet  120 30 Robert Reyes MD     02/04/2019 02/08/2019 oxycodone hcl 5 mg tablet  120 30 Robert Reyes MD     01/04/2019 01/05/2019 oxycodone-acetaminophen 5-325 mg tablet  9 3 Roro Fabian     12/24/2018 12/24/2018 lorazepam 0.5 mg tablet  90 30 Roxanna Lee MD     12/12/2018 12/22/2018 oxycodone hcl 5 mg tablet  120 30 Robert Reyes MD         Patient Name: Anna To YOB: 1950   Address: [address] [place] [id number] Sex: Female         Rx Written    Rx Dispensed    Drug    Quantity    Days Supply    Prescriber Name              11/22/2019 11/22/2019 oxycodone hcl 5 mg tablet  40 10 Julio Cesar Wang MD     11/22/2019 11/22/2019 lorazepam 1 mg tablet  60 15 Julio Cesar Wang MD     11/14/2019 11/14/2019 lorazepam 1 mg tablet  40 10 Julio Cesar Wang MD     11/14/2019 11/14/2019 oxycodone hcl 5 mg tablet  29 8 Julio Cesar Wang MD         Patient Name: Anna To YOB: 1950   Address: [address] [place] [id number] Sex: Female         Rx Written    Rx Dispensed    Drug    Quantity    Days Supply    Prescriber Name              03/30/2019 03/30/2019 alprazolam 0.5 mg tablet  30 10 Giuliano Matos MD     03/27/2019 03/27/2019 oxycodone hcl 5 mg tablet  30 5 Hetal Casanova MD     03/20/2019 03/20/2019 alprazolam 0.5 mg tablet  30 15 Hetal Casanova MD     03/20/2019 03/20/2019 oxycodone hcl 5 mg tablet  30 30 Hetal Casanova MD     03/20/2019 03/20/2019 oxycodone hcl 5 mg tablet  30 5 Hetal Casanova MD         Patient Name: Anna To YOB: 1950   Address: [id number] [place] [id number] Sex: Female         Rx Written    Rx Dispensed    Drug    Quantity    Days Supply    Prescriber Name              01/24/2019 01/24/2019 lorazepam 0.5 mg tablet  42 14 [facility]           * - Drugs marked with an asterisk are compound drugs. If the compound drug is made up of more than one controlled substance, then each controlled substance will be a separate row in the

## 2019-12-15 NOTE — H&P ADULT - NSHPREVIEWOFSYSTEMS_GEN_ALL_CORE
REVIEW OF SYSTEMS:  CONSTITUTIONAL: No weakness. No fevers. No chills. No weight loss. Good appetite.  EYES: No visual changes. No eye pain.  ENT: No hearing difficulty. No vertigo. No dysphagia. No Sinusitis/rhinorrhea.  NECK: No pain. No stiffness/rigidity.  CARDIAC: No chest pain. No palpitations.  RESPIRATORY: No cough. No SOB. No hemoptysis.  GASTROINTESTINAL: No abdominal pain. No nausea. No vomiting. No hematemesis. No diarrhea. No constipation. No melena. No hematochezia.  GENITOURINARY: No dysuria. No frequency. No hesitancy. No hematuria.  NEUROLOGICAL: No numbness. No focal weakness. No incontinence. No headache.  BACK: No back pain.  EXTREMITIES: No lower extremity edema. Full ROM.  SKIN: No rashes. No itching. No other lesions.  PSYCHIATRIC: No depression. No anxiety. No SI/HI.  ALLERGIC: No lip swelling. No hives.  All other review of systems is negative unless indicated above.  [  ] Unable to assess ROS because REVIEW OF SYSTEMS:  CONSTITUTIONAL: +weakness. No fevers. No chills. No weight loss. Good appetite.  EYES: No visual changes. No eye pain.  ENT: No hearing difficulty. No vertigo. No dysphagia. No Sinusitis/rhinorrhea.  NECK: No pain. No stiffness/rigidity.  CARDIAC: No chest pain. No palpitations.  RESPIRATORY: No cough. No SOB. No hemoptysis.  GASTROINTESTINAL: No abdominal pain. No nausea. No vomiting. No hematemesis. No diarrhea. No constipation. No melena. No hematochezia.  GENITOURINARY: No dysuria. No frequency. No hesitancy. No hematuria.  NEUROLOGICAL: No numbness. No focal weakness. No incontinence. No headache.  BACK: No back pain.  EXTREMITIES: +lower extremity edema. Full ROM.  SKIN: No rashes. No itching. No other lesions.  PSYCHIATRIC: No depression. No anxiety. No SI/HI.  ALLERGIC: No lip swelling. No hives.  All other review of systems is negative unless indicated above.

## 2019-12-15 NOTE — H&P ADULT - HISTORY OF PRESENT ILLNESS
69 F PMH  Anxiety, HTN, HLD , NOEMY, asthma, COPD, DVT , Breast Cancer, Kidney cancer  in remission , Graves disease, Hypothyroid , S/P laminectomy , carpal tunnel syndrome, Obesity, multiple admissions for sob in past, now p/w b/l LE edema and pain.    VS: 69 F PMH  Anxiety, HTN, HLD , NOEMY, asthma, COPD, DVT no longer on a/c , remote Breast Cancer, Kidney cancer  in remission , Graves disease, Hypothyroid , S/P laminectomy , carpal tunnel syndrome, Obesity, multiple admissions for sob in past attributed to anxiety/panic attacks, now p/w b/l LE edema and pain.    Last admit 11/2019 for sob. Had largely negative workup: nondx cxr, MRa/MRI brain, duplex b/l LE (neg dvt but +subQ edema), nondx duplex b/l carotids. Given course of steroids/nebs, followed by psych for anxiety meds titration. Course c/b transient R eye visual loss, seen by Neuro Dr. Parada, recommended temp artery bx which pt refused. Course also c/b L distal hand weakness with concern for cervical radiculopathy or nerve entrapment, f/u with neuro recommended.     VS:  98.5, 88, 144/88, 16, 96% 2LNC.  Labs: Leukocytosis 18.2, chronic stable anemia, plt wnl, neutrophil predominance with 0.9 % bandemia. coags unrevealing.  Cmp with moderate hyperglycemia rest unrevealing, GFR intact. HST initial 34.  ESR 5. UA positive for nitrites, rbc/wbc, few sukhi, but neg LE. Utox pos for oxycodone.   CXR prelim ABELARDO. XR tib/fib b/l prelim neg for fx.  In Er pt received IV vanco, IVF, home norvasc/losartan prior to medicine team involvement. 69 F PMH  Anxiety, HTN, HLD , NOEMY, asthma, COPD, DVT no longer on a/c , remote Breast Cancer, Kidney cancer  in remission , Graves disease, Hypothyroid , S/P laminectomy , carpal tunnel syndrome, Obesity, multiple admissions for sob in past attributed to anxiety/panic attacks, now p/w b/l LE edema and pain. Hx is limited at this time as pt is lethargic with flat affect; no family at bedside, call to  not answered.  Per chart, it appears she was brought in for LE swelling and redness b/l . Pt at this time only states "I am cold." and repeats this as the reason for admission.  She is following simple commands and is asking to be left alone.  She denies cp, sob, f/c, n/v/d, dysuria, cough. Per chart review, there appears to be some concern from initial ER treating team for ?elder abuse, but I am unable to verify the circumstances of this concern at this time.    Last admit 11/2019 for sob. Had largely negative workup: nondx cxr, MRa/MRI brain, duplex b/l LE (neg dvt but +subQ edema), nondx duplex b/l carotids. Given course of steroids/nebs, followed by psych for anxiety meds titration. Course c/b transient R eye visual loss, seen by Neuro Dr. Parada, recommended temp artery bx which pt refused. Course also c/b L distal hand weakness with concern for cervical radiculopathy or nerve entrapment, f/u with neuro recommended.     VS:  98.5, 88, 144/88, 16, 96% 2LNC.  Labs: Leukocytosis 18.2, chronic stable anemia, plt wnl, neutrophil predominance with 0.9 % bandemia. coags unrevealing.  Cmp with moderate hyperglycemia rest unrevealing, GFR intact. HST initial 34.  ESR 5. UA positive for nitrites, rbc/wbc, few sukhi, but neg LE. Utox pos for oxycodone.   CXR prelim ABELARDO. XR tib/fib b/l prelim neg for fx.  In Er pt received IV vanco, IVF, home norvasc/losartan prior to medicine team involvement.

## 2019-12-15 NOTE — H&P ADULT - ASSESSMENT
69 F PMH  Anxiety, HTN, HLD , NOEMY, asthma, COPD, DVT no longer on a/c , remote Breast Cancer, Kidney cancer  in remission , Graves disease, Hypothyroid , S/P laminectomy , carpal tunnel syndrome, Obesity, multiple admissions for sob in past attributed to anxiety/panic attacks, now p/w b/l LE edema and pain.

## 2019-12-15 NOTE — H&P ADULT - NSHPSOCIALHISTORY_GEN_ALL_CORE
Social History:    Marital Status:  (   )    (   ) Single    (   )    (  )   Occupation:   Lives with: (  ) alone  (  ) children   (  ) spouse   (  ) parents  (  ) other    Substance Use (street drugs): (  ) never used  (  ) other:  Tobacco Usage:  (   ) never smoked   (   ) former smoker   (   ) current smoker  (     ) pack year  (        ) last cigarette date  Alcohol Usage:  Sexual History: Social History:    Marital Status:  ( x  )    (   ) Single    (   )    (  )   Occupation: not working  Lives with: (  ) alone  (  ) children   (x  ) spouse   (  ) parents  (  ) other    Substance Use (street drugs): (  x) never used  (  ) other:  Tobacco Usage:  ( x  ) never smoked   (   ) former smoker   (   ) current smoker  (     ) pack year  (        ) last cigarette date  Alcohol Usage: denies

## 2019-12-15 NOTE — ED BEHAVIORAL HEALTH ASSESSMENT NOTE - OTHER PAST PSYCHIATRIC HISTORY (INCLUDE DETAILS REGARDING ONSET, COURSE OF ILLNESS, INPATIENT/OUTPATIENT TREATMENT)
Multiple hospitalizations but only record of one at [facility] in 2015 for delusional disorder. No known SA or SIB or aggression.

## 2019-12-15 NOTE — ED BEHAVIORAL HEALTH ASSESSMENT NOTE - HPI (INCLUDE ILLNESS QUALITY, SEVERITY, DURATION, TIMING, CONTEXT, MODIFYING FACTORS, ASSOCIATED SIGNS AND SYMPTOMS)
69 F with PPHX of delusional disorder and anxiety disorder with multiple psychiatric admissions and PMH  Anxiety, minimally ambulatory with use of walker,  HTN, HLD , NOEMY, asthma, COPD, DVT no longer on a/c , remote Breast Cancer, Kidney cancer  in remission , Graves disease, Hypothyroid , S/P laminectomy , carpal tunnel syndrome, Obesity, multiple admissions for sob in past attributed to anxiety/panic attacks, now p/w b/l LE edema and pain.   She complains of dyspnea and is admitted medically for cellulitis of the leg and UTI.  Psychiatry is consulted due to history of anxiety and current anxiety and tearfulness in ED as a result of her medical admission.    Spoke with patient and her  at bedside.  Collateral from her , Panfilo To [phone] is that her outpatient psychiatrist retired 6 months ago and she has refused to see a new psychiatrist.  He reports she is paranoid that he has negative intent towards her, is obsessed with perfumes and fragrances, believing they make her ill and that others wear fragrances when this is not the case, and he worries about her medications as she does not allow him to assist with mediation management.  He is unable to provide a medication list.  He reports she is anxious daily and rocks and has tremors when anxious.    Contrary to collateral report, patient reports that anxiety is not her problem but rather a long history of other medical illnesses and therefore she does not need a psychiatrist.  She blurts out “He’s lying!” when her  attempts to provide history and becomes tearful and preoccupied with her nebulizer and reports that he breaks her glasses, throws out her medications, and curses at her.  She is also not able to provide a reliable medication list (for example reporting “I have never been on Zyprexa in my life!” despite evidence to the contrary per records.  She denies SI/HI/I/P but at several points expresses fear that she will be “dead before the psychiatrist comes!” not describing SI but rather fear of dying due to medical problems.    Review of records shows one know hospitalization at [facility] in 2015 with diagnosis of delusional disorder and no known corroboration of allegations towards her  but with evidence of paranoia directed towards her  being transferred towards hospital staff during that admission.

## 2019-12-15 NOTE — H&P ADULT - PROBLEM SELECTOR PLAN 5
-Pt eval  -fall, asp, sz prec  -SW eval given concern for ?elder abuse per ER team.  Pt with hx of documented paranoid delusions on prior notes (including psych notes from 11/2019 admission last month) so unclear what actual story is.  Call to  overnight not answered. -Pt eval  -fall, asp, sz prec  -SW eval given concern for ?elder abuse per ER team.  Pt with hx of documented paranoid delusions on prior notes (including psych notes from 11/2019 admission last month) so unclear what actual story is.  Call to  overnight not answered. Needs psych eval again as well.

## 2019-12-15 NOTE — ED PROVIDER NOTE - OBJECTIVE STATEMENT
PGY2/MD Vish. 70 yo F from home lives with  (has HHA 8hrs/7days) with PMHx of Anxiety, HTN, HLD , NOEMY, asthma, COPD, DVT , Breast Cancer, Kidney cancer  in remission , Graves disease, Hypothyroid , S/P laminectomy , carpal tunnel syndrome, Obesity, multiple admission to Dr. Houston for SOB, p/w b/l leg swelling and pain.

## 2019-12-15 NOTE — ED BEHAVIORAL HEALTH ASSESSMENT NOTE - DESCRIPTION
Anxious and agitated, tearful.  Received Ativan PO PRN. cellulitis, UTI, Anxiety, HTN, HLD , NOEMY, asthma, COPD, DVT no longer on a/c , remote Breast Cancer, Kidney cancer  in remission , Graves disease, Hypothyroid , S/P laminectomy , carpal tunnel syndrome, Obesity She is retired from working as a manager of a store in [employer] and from volunteering with cancer associations.  She is  with 2 sons and 1 daughter, one son with disabilities who lives with her and her .

## 2019-12-15 NOTE — ED ADULT NURSE NOTE - OBJECTIVE STATEMENT
69F bibems activated by spouse from home with c/o swollen lower extremities with open wound oozing from open wound, redness and swelling noted and warm to touch. Pt lethargic and Spouse at bedside, reports that pt has not been sleeping lately,  been swollen but the lower extremities having redness and oozing. Reports pt has h/o Anxiety  Asthma    Breast cancer in situ, left  COPD (chronic obstructive pulmonary disease)  DVT (deep venous thrombosis)  history of- not presently on A/C Graves disease    Hyperlipidemia  Hypertension  Hypothyroid  Kidney cancer, primary, with metastasis from kidney to other site, left  LEft sided- s/p nephrectomy only- no chemo or RT Obesity  Sleep apnea. Afebrile in ED. Patient non ambulatory, bed bound as per spouse.

## 2019-12-15 NOTE — ED BEHAVIORAL HEALTH ASSESSMENT NOTE - SUMMARY
69 F with PPHX of delusional disorder and anxiety disorder with multiple psychiatric admissions and PMH  Anxiety, minimally ambulatory with use of walker,  HTN, HLD , NOEMY, asthma, COPD, DVT no longer on a/c , remote Breast Cancer, Kidney cancer  in remission , Graves disease, Hypothyroid , S/P laminectomy , carpal tunnel syndrome, Obesity, multiple admissions for sob in past attributed to anxiety/panic attacks, now p/w b/l LE edema and pain.   She complains of dyspnea and is admitted medically for cellulitis of the leg and UTI.  Psychiatry is consulted due to history of anxiety and current anxiety and tearfulness in ED as a result of her medical admission.    She presents disheveled and unkempt with high anxiety, paranoia and delusions of persecution from her , suspected olfactory hallucinations related to perfumes and fear of death from medical illness.    Dx: Delusional disorder and anxiety, r/o Schizophrenia    As it is likely she has not been appropriately taking medications (has not seen a psychiatrist in 6 months since her outpatient doctor retired, cannot provide a medication list, and her  reports she does not allow anyone to help with her medications) recommend stopping current psychotropics in order to retitrate under supervision: latuda 40mg PO Q day, citalopram 30mg P Q day, neurontin 100mg PO TID, Zyprexa 2.5mg PO BID PRN, Ativan 0.5mg PO Q 6 hours PRN, melatonin 3mg PO Q HS PRN.      Would start zyprexa 5mg PO Q HS, citalpram 10mg PO Q day, and ativan 0.5mg PO Q 6 hours PRN anxiety.

## 2019-12-15 NOTE — ED ADULT NURSE REASSESSMENT NOTE - NS ED NURSE REASSESS COMMENT FT1
Pt agitated, shouting, medicated as ordered and pt is calmer now. Phone call facilitated to pt's , pt spoke with her  and states she feels better. Report given to ADDI Thomas Holding.

## 2019-12-15 NOTE — H&P ADULT - PROBLEM SELECTOR PLAN 1
-admited for concern for LE edema, redness, pain, concern for cellulitis  -no purulence, b/l LE wounds that appear dry and without purulence  -agree with IV vanco for now  -ID eval  -f/u bcx, ucx  -check LE dopper prev neg but hx of dvt in past  -ESR neg, XR tib fib prelim without fx, f/u official read regarding osteo but lower suspicion.

## 2019-12-15 NOTE — ED BEHAVIORAL HEALTH ASSESSMENT NOTE - AXIS III
Cellulitis, UTI, Anxiety, HTN, HLD , NOEMY, asthma, COPD, DVT no longer on a/c , remote Breast Cancer, Kidney cancer  in remission , Graves disease, Hypothyroid , S/P laminectomy , carpal tunnel syndrome, Obesity.

## 2019-12-15 NOTE — H&P ADULT - ATTENDING COMMENTS
Care to be assumed by Dr. Welch at 8 am.  This patient was assigned to me by the hospitalist in charge; my involvement in this case has consisted of the initial history, physical, chart review, and management plan.  This patient was previously unknown to me.  Case endorsed to NP covering ER at ____. Care to be assumed by Dr. Welch at 8 am.  This patient was assigned to me by the hospitalist in charge; my involvement in this case has consisted of the initial history, physical, chart review, and management plan.  This patient was previously unknown to me.  Case endorsed to NP covering ER at 630 am.  She is aware of outlined plan above.

## 2019-12-15 NOTE — CHART NOTE - NSCHARTNOTEFT_GEN_A_CORE
Patient with up-trending lactate (2.9 -> 3.0) despite receiving 2L NS boluses  Patient seen and examined at the bedside. Patient sleeping, resting comfortably. No complaints at this time     Vital Signs Last 24 Hrs  T(C): 36.9 (14 Dec 2019 21:38), Max: 36.9 (14 Dec 2019 21:38)  T(F): 98.5 (14 Dec 2019 21:38), Max: 98.5 (14 Dec 2019 21:38)  HR: 50 (15 Dec 2019 02:04) (50 - 88)  BP: 174/66 (15 Dec 2019 02:04) (144/88 - 174/66)  RR: 16 (15 Dec 2019 02:04) (16 - 16)  SpO2: 99% (15 Dec 2019 02:04) (96% - 100%)    PHYSICAL EXAM:  General: NAD, A&Ox3  Respiratory: Lungs clear to auscultation bilaterally. No wheezes, rales, rhonchi. Normal respiratory effort.   Cardiovascular: S1, S2 present. Regular rate and rhythm. No murmurs, rubs, or gallops  Gastrointestinal: BS x4 normoactive. Soft, non-tender, non-distended.   Extremities: (+) chronic scars, warmth, edema, and erythema on b/l lower extremities. No cyanosis. 2+ peripheral pulses.     A/P  68 yo F from home lives with  (has HHA 8hrs/7days) with PMHx of Anxiety, HTN, HLD, NOEMY, asthma, COPD, DVT , Breast Cancer, Kidney cancer  in remission, Graves disease, Hypothyroid, S/P laminectomy, carpal tunnel syndrome, Obesity, multiple admission to Dr. Houston for SOB, p/w b/l leg swelling and pain.  Now, patient with up-trending lactate (2.9 -> 3.0) despite receiving 2L NS boluses. Patient without complaints at this time. Physical exam significant for (+) chronic scars, warmth, edema, and erythema b/l lower extremities, likely 2/2 cellulitis     #Elevated lactate   -500 cc bolus x 1 STAT given  -Repeat vitals with AM labs at ~06:00  -F/u repeat lactate to be drawn at ~06:00  -Discussed with RN  -Will continue to monitor  -Will endorse to AM team      JOHNATHON Kearney-C  #[id number] Patient with up-trending lactate (2.9 -> 3.0) despite receiving 2L NS boluses  Patient seen and examined at the bedside. Patient sleeping, resting comfortably. No complaints at this time     Vital Signs Last 24 Hrs  T(C): 36.9 (14 Dec 2019 21:38), Max: 36.9 (14 Dec 2019 21:38)  T(F): 98.5 (14 Dec 2019 21:38), Max: 98.5 (14 Dec 2019 21:38)  HR: 50 (15 Dec 2019 02:04) (50 - 88)  BP: 174/66 (15 Dec 2019 02:04) (144/88 - 174/66)  RR: 16 (15 Dec 2019 02:04) (16 - 16)  SpO2: 99% (15 Dec 2019 02:04) (96% - 100%)    PHYSICAL EXAM:  General: NAD, A&Ox3  Respiratory: Lungs clear to auscultation bilaterally. No wheezes, rales, rhonchi. Normal respiratory effort.   Cardiovascular: S1, S2 present. Regular rate and rhythm. No murmurs, rubs, or gallops  Gastrointestinal: BS x4 normoactive. Soft, non-tender, non-distended.   Extremities: (+) chronic scars, warmth, edema, and erythema on b/l lower extremities. No cyanosis. 2+ peripheral pulses.     A/P  68 yo F from home lives with  (has HHA 8hrs/7days) with PMHx of Anxiety, HTN, HLD, NOEMY, asthma, COPD, DVT , Breast Cancer, Kidney cancer  in remission, Graves disease, Hypothyroid, S/P laminectomy, carpal tunnel syndrome, Obesity, multiple admission to Dr. Houston for SOB, p/w b/l leg swelling and pain.  Now, patient with up-trending lactate (2.9 -> 3.0) despite receiving 2L NS boluses. Patient without complaints at this time. Physical exam significant for (+) chronic scars, warmth, edema, and erythema b/l lower extremities, likely 2/2 cellulitis     #Elevated lactate   -500 cc bolus x 1 STAT given  -Repeat vitals with AM labs at ~06:00  -F/u repeat lactate to be drawn at ~14:00  -Discussed with RN  -Will continue to monitor  -Will endorse to AM team      JOHNATHON Kearney-C  #[id number]

## 2019-12-15 NOTE — ED BEHAVIORAL HEALTH ASSESSMENT NOTE - CURRENT MEDICATION
Psychiatric medications:  latuda 40mg PO Q day, citalopram 30mg P Q day, neurontin 100mg PO TID, Zyprexa 2.5mg PO BID PRN, Ativan 0.5mg PO Q 6 hours PRN, melatonin 3mg PO Q HS PRN

## 2019-12-15 NOTE — ED BEHAVIORAL HEALTH ASSESSMENT NOTE - RISK ASSESSMENT
She presents disheveled and unkempt with high anxiety, paranoia and delusions of persecution from her , suspected olfactory hallucinations related to perfumes and fear of death from medical illness.    Dx: Delusional disorder and anxiety, r/o Schizophrenia    As it is likely she has not been appropriately taking medications (has not seen a psychiatrist in 6 months since her outpatient doctor retired, cannot provide a medication list, and her  reports she does not allow anyone to help with her medications) recommend stopping current psychotropics in order to retitrate under supervision: latuda 40mg PO Q day, citalopram 30mg P Q day, neurontin 100mg PO TID, Zyprexa 2.5mg PO BID PRN, Ativan 0.5mg PO Q 6 hours PRN, melatonin 3mg PO Q HS PRN.      Would start zyprexa 5mg PO Q HS, citalpram 10mg PO Q day, and ativan 0.5mg PO Q 6 hours PRN anxiety. Low Acute Suicide Risk

## 2019-12-15 NOTE — ED PROVIDER NOTE - PHYSICAL EXAMINATION
PGY2/MD Vish.   VITALS: reviewed  GEN: No apparent distress, A & O x 3-4  HEAD/EYES: NC/AT, PERRL, EOMI, anicteric sclerae, no conjunctival pallor  ENT: mucus membranes moist, oropharynx WNL, trachea midline, no JVD, neck is supple  RESP: lungs CTA with equal breath sounds bilaterally, chest wall nontender and atraumatic  CV: heart with reg rhythm S1, S2, no murmur; distal pulses intact and symmetric bilaterally  ABDOMEN: normoactive bowel sounds, soft, nondistended, nontender, no palpable masses  : no CVAT  MSK: extremities atraumatic and nontender, no edema, no asymmetry.  SKIN: warm, dry, no rash, + multiple bruises, no cyanosis. color appropriate for ethnicity, b/l leg swelling, erythematous, warmth, some scars on the surface  NEURO: alert, mentating appropriately, no facial asymmetry. gross sensation, motor, coordination are intact  PSYCH: Affect appropriate

## 2019-12-15 NOTE — H&P ADULT - NSHPLABSRESULTS_GEN_ALL_CORE
Labs personally reviewed : Leukocytosis 18.2, chronic stable anemia, plt wnl, neutrophil predominance with 0.9 % bandemia. coags unrevealing.  Cmp with moderate hyperglycemia rest unrevealing, GFR intact. HST initial 34.  ESR 5. UA positive for nitrites, rbc/wbc, few sukhi, but neg LE. Utox pos for oxycodone.     Imaging personally reviewed CXR prelim ABELARDO. XR tib/fib b/l prelim neg for fx.  In Er pt received IV vanco, IVF, home norvasc/losartan prior to medicine team involvement.   EKG personally reviewed Labs personally reviewed : Leukocytosis 18.2, chronic stable anemia, plt wnl, neutrophil predominance with 0.9 % bandemia. coags unrevealing.  Cmp with moderate hyperglycemia rest unrevealing, GFR intact. HST initial 34.  ESR 5. UA positive for nitrites, rbc/wbc, few sukhi, but neg LE. Utox pos for oxycodone.     Imaging personally reviewed CXR prelim ABELARDO. XR tib/fib b/l prelim neg for fx.  In Er pt received IV vanco, IVF, home norvasc/losartan prior to medicine team involvement.   No EKG in chart.

## 2019-12-15 NOTE — H&P ADULT - PROBLEM SELECTOR PLAN 3
-Pt with complex anxiety spectrum disorder, paranoid delusion disorder with mood issues as noted on prior extensive behavioral health eval from admission 11/2019.  -c/w citalopram 30 daily  -c/w latuda 80 daily  -c/w zyprexa 2.5 twice daily prn extreme anxiety  -Long standing ativan use as documented by psych notes, c/w ativan 0.5 mg twice daily for now  -c/w artane 2mg twice daily as per prior d/c paperwork. -Pt with complex anxiety spectrum disorder, paranoid delusion disorder with mood issues as noted on prior extensive behavioral health eval from admission 11/2019.  -c/w citalopram 30 daily  -c/w latuda 80 daily  -c/w zyprexa 2.5 twice daily prn extreme anxiety  -Long standing ativan use as documented by psych notes, c/w ativan 0.5 mg twice daily for now  -c/w artane 2mg twice daily as per prior d/c paperwork.  -ISTOP Reference #: [id number] -Pt with complex anxiety spectrum disorder, paranoid delusion disorder with mood issues as noted on prior extensive behavioral health eval from admission 11/2019.  -c/w citalopram 30 daily  -c/w latuda 80 daily  -c/w zyprexa 2.5 twice daily prn extreme anxiety  -Long standing ativan use as documented by psych notes, c/w ativan 0.5 mg twice daily for now  -c/w artane 2mg twice daily as per prior d/c paperwork.  -ISTOP Reference #: [id number]  -needs psych eval -Pt with complex anxiety spectrum disorder, paranoid delusion disorder with mood issues as noted on prior extensive behavioral health eval from admission 11/2019.  -c/w citalopram 30 daily  -c/w latuda 80 daily  -c/w zyprexa 2.5 twice daily prn extreme anxiety  -Long standing ativan use as documented by psych notes, c/w ativan 0.5 mg q6 hrs prn for now  -c/w artane 2mg twice daily as per prior d/c paperwork.  -ISTOP Reference #: [id number]  -needs psych eval  -goal would be to slowly taper benzos as o/p as noted in prior psych notes -Pt with complex anxiety spectrum disorder, paranoid delusion disorder with mood issues as noted on prior extensive behavioral health eval from admission 11/2019.  -c/w citalopram 30 daily  -c/w latuda 80 daily  -c/w zyprexa 2.5 twice daily prn extreme anxiety  -Long standing ativan use as documented by psych notes, c/w ativan 0.5 mg q6 hrs prn for now  -c/w artane 2mg twice daily as per prior d/c paperwork.  -ISTOP Reference #: [id number]  -needs psych eval defer to day attending to arrange.   -goal would be to slowly taper benzos as o/p as noted in prior psych notes

## 2019-12-15 NOTE — H&P ADULT - NSHPPHYSICALEXAM_GEN_ALL_CORE
PHYSICAL EXAM:    Vital Signs Last 24 Hrs  T(C): 36.9 (14 Dec 2019 21:38), Max: 36.9 (14 Dec 2019 21:38)  T(F): 98.5 (14 Dec 2019 21:38), Max: 98.5 (14 Dec 2019 21:38)  HR: 50 (15 Dec 2019 02:04) (50 - 88)  BP: 174/66 (15 Dec 2019 02:04) (144/88 - 174/66)  BP(mean): --  RR: 16 (15 Dec 2019 02:04) (16 - 16)  SpO2: 99% (15 Dec 2019 02:04) (96% - 100%)    GENERAL: NAD, well-groomed, well-developed  HEAD:  Atraumatic, Normocephalic  EYES: EOMI, PERRLA, conjunctiva and sclera clear  ENMT: No tonsillar erythema, exudates, or enlargement; Moist mucous membranes, Good dentition, No lesions  NECK: Supple, No JVD, Normal thyroid  CHEST/LUNG: Clear to percussion bilaterally; No rales, rhonchi, wheezing, or rubs  HEART: Regular rate and rhythm; No murmurs, rubs, or gallops  ABDOMEN: Soft, Nontender, Nondistended; Bowel sounds present  EXTREMITIES:  2+ Peripheral Pulses, No clubbing, cyanosis, or edema  LYMPH: No lymphadenopathy noted  SKIN: No rashes or lesions  NERVOUS SYSTEM:  Alert & Oriented X3, Good concentration; Motor Strength 5/5 B/L upper and lower extremities; DTRs 2+ intact and symmetric PHYSICAL EXAM:    Vital Signs Last 24 Hrs  T(C): 36.9 (14 Dec 2019 21:38), Max: 36.9 (14 Dec 2019 21:38)  T(F): 98.5 (14 Dec 2019 21:38), Max: 98.5 (14 Dec 2019 21:38)  HR: 50 (15 Dec 2019 02:04) (50 - 88)  BP: 174/66 (15 Dec 2019 02:04) (144/88 - 174/66)  BP(mean): --  RR: 16 (15 Dec 2019 02:04) (16 - 16)  SpO2: 99% (15 Dec 2019 02:04) (96% - 100%)    GENERAL: lethargic, arouseable to voice/noxious stimuli, following simple commands. disheveled appearance, chronically ill appearing.  HEAD:  Atraumatic, Normocephalic  EYES: EOMI, PERRLA, conjunctiva and sclera clear  ENMT: No tonsillar erythema, exudates, or enlargement; Moist mucous membranes, Good dentition, No lesions  NECK: Supple, No JVD, Normal thyroid  CHEST/LUNG: Clear to percussion bilaterally; No rales, rhonchi, wheezing, or rubs no resp distress or accessory muscle usage.  HEART: Regular rate and rhythm; No murmurs, rubs, or gallops, 2+ edema b/l LE  ABDOMEN: Soft, Nontender, Nondistended; Bowel sounds present, no rebound/guarding  EXTREMITIES:  2+ Peripheral Pulses, No clubbing, cyanosis  LYMPH: No lymphadenopathy noted, no lymphangitis  SKIN: +b/l patchy erythema of b/l LE, mild warmth. dry LE wounds b/l no visible purulence. +TTP b/l LE  NERVOUS SYSTEM:  Alert & Oriented X1-2, poor concentration; not participating with neuro exam at this time. no facial droop. moving all 4 ext spont and following similar commands but not fully cooperating with neuro exam at this time.

## 2019-12-15 NOTE — H&P ADULT - PROBLEM SELECTOR PLAN 2
-pt with positive UA, at this time not reliably endorsing symptoms. Also has persistent mild lactate elevation despite IVF.  Appears a bit more confused than what is documented on prior charts  -Unable to reach family at this hour to assess collateral regarding urinary sx  -Seems reasonable given presentation with high lactate and elevated wbc to cover for urine as well; has hx of prior uti from 100k e coli that was resistant to fluoroquinolones.   -start zosyn for now  -f/u bcx, ucx, narrow abx as able  -ID eval  -check CTH in setting of lethargy.
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
## 2019-12-16 DIAGNOSIS — F05 DELIRIUM DUE TO KNOWN PHYSIOLOGICAL CONDITION: ICD-10-CM

## 2019-12-16 DIAGNOSIS — F29 UNSPECIFIED PSYCHOSIS NOT DUE TO A SUBSTANCE OR KNOWN PHYSIOLOGICAL CONDITION: ICD-10-CM

## 2019-12-16 LAB
ANION GAP SERPL CALC-SCNC: 9 MMOL/L — SIGNIFICANT CHANGE UP (ref 5–17)
BUN SERPL-MCNC: 18 MG/DL — SIGNIFICANT CHANGE UP (ref 7–23)
CALCIUM SERPL-MCNC: 8.7 MG/DL — SIGNIFICANT CHANGE UP (ref 8.4–10.5)
CHLORIDE SERPL-SCNC: 106 MMOL/L — SIGNIFICANT CHANGE UP (ref 96–108)
CO2 SERPL-SCNC: 29 MMOL/L — SIGNIFICANT CHANGE UP (ref 22–31)
CREAT SERPL-MCNC: 0.63 MG/DL — SIGNIFICANT CHANGE UP (ref 0.5–1.3)
GLUCOSE SERPL-MCNC: 98 MG/DL — SIGNIFICANT CHANGE UP (ref 70–99)
HCT VFR BLD CALC: 36.6 % — SIGNIFICANT CHANGE UP (ref 34.5–45)
HGB BLD-MCNC: 10.8 G/DL — LOW (ref 11.5–15.5)
MCHC RBC-ENTMCNC: 27.5 PG — SIGNIFICANT CHANGE UP (ref 27–34)
MCHC RBC-ENTMCNC: 29.5 GM/DL — LOW (ref 32–36)
MCV RBC AUTO: 93.1 FL — SIGNIFICANT CHANGE UP (ref 80–100)
PLATELET # BLD AUTO: 131 K/UL — LOW (ref 150–400)
POTASSIUM SERPL-MCNC: 3.5 MMOL/L — SIGNIFICANT CHANGE UP (ref 3.5–5.3)
POTASSIUM SERPL-SCNC: 3.5 MMOL/L — SIGNIFICANT CHANGE UP (ref 3.5–5.3)
RBC # BLD: 3.93 M/UL — SIGNIFICANT CHANGE UP (ref 3.8–5.2)
RBC # FLD: 16.6 % — HIGH (ref 10.3–14.5)
SODIUM SERPL-SCNC: 144 MMOL/L — SIGNIFICANT CHANGE UP (ref 135–145)
VANCOMYCIN TROUGH SERPL-MCNC: 12.3 UG/ML — SIGNIFICANT CHANGE UP (ref 10–20)
WBC # BLD: 13.49 K/UL — HIGH (ref 3.8–10.5)
WBC # FLD AUTO: 13.49 K/UL — HIGH (ref 3.8–10.5)

## 2019-12-16 PROCEDURE — 99232 SBSQ HOSP IP/OBS MODERATE 35: CPT | Mod: GC

## 2019-12-16 PROCEDURE — 93970 EXTREMITY STUDY: CPT | Mod: 26

## 2019-12-16 RX ORDER — CEFEPIME 1 G/1
1000 INJECTION, POWDER, FOR SOLUTION INTRAMUSCULAR; INTRAVENOUS EVERY 12 HOURS
Refills: 0 | Status: DISCONTINUED | OUTPATIENT
Start: 2019-12-16 | End: 2019-12-17

## 2019-12-16 RX ADMIN — CITALOPRAM 10 MILLIGRAM(S): 10 TABLET, FILM COATED ORAL at 14:54

## 2019-12-16 RX ADMIN — LIDOCAINE 1 PATCH: 4 CREAM TOPICAL at 20:15

## 2019-12-16 RX ADMIN — OLANZAPINE 5 MILLIGRAM(S): 15 TABLET, FILM COATED ORAL at 00:04

## 2019-12-16 RX ADMIN — GABAPENTIN 100 MILLIGRAM(S): 400 CAPSULE ORAL at 14:55

## 2019-12-16 RX ADMIN — ENOXAPARIN SODIUM 40 MILLIGRAM(S): 100 INJECTION SUBCUTANEOUS at 14:56

## 2019-12-16 RX ADMIN — GABAPENTIN 100 MILLIGRAM(S): 400 CAPSULE ORAL at 06:09

## 2019-12-16 RX ADMIN — Medication 5 MILLIGRAM(S): at 20:36

## 2019-12-16 RX ADMIN — Medication 125 MICROGRAM(S): at 06:09

## 2019-12-16 RX ADMIN — Medication 5 MILLIGRAM(S): at 06:08

## 2019-12-16 RX ADMIN — GABAPENTIN 100 MILLIGRAM(S): 400 CAPSULE ORAL at 20:37

## 2019-12-16 RX ADMIN — ATORVASTATIN CALCIUM 40 MILLIGRAM(S): 80 TABLET, FILM COATED ORAL at 20:37

## 2019-12-16 RX ADMIN — Medication 0.5 MILLIGRAM(S): at 08:15

## 2019-12-16 RX ADMIN — Medication 0.5 MILLIGRAM(S): at 20:37

## 2019-12-16 RX ADMIN — LIDOCAINE 1 PATCH: 4 CREAM TOPICAL at 15:05

## 2019-12-16 RX ADMIN — OLANZAPINE 5 MILLIGRAM(S): 15 TABLET, FILM COATED ORAL at 20:36

## 2019-12-16 RX ADMIN — Medication 0.5 MILLIGRAM(S): at 15:58

## 2019-12-16 RX ADMIN — OXYCODONE HYDROCHLORIDE 5 MILLIGRAM(S): 5 TABLET ORAL at 08:16

## 2019-12-16 RX ADMIN — LOSARTAN POTASSIUM 100 MILLIGRAM(S): 100 TABLET, FILM COATED ORAL at 06:09

## 2019-12-16 RX ADMIN — Medication 3 MILLIGRAM(S): at 20:37

## 2019-12-16 RX ADMIN — Medication 5 MILLIGRAM(S): at 00:04

## 2019-12-16 RX ADMIN — Medication 3 MILLILITER(S): at 12:13

## 2019-12-16 RX ADMIN — Medication 1 MILLIGRAM(S): at 14:56

## 2019-12-16 RX ADMIN — AMLODIPINE BESYLATE 2.5 MILLIGRAM(S): 2.5 TABLET ORAL at 06:09

## 2019-12-16 RX ADMIN — Medication 2000 UNIT(S): at 14:54

## 2019-12-16 RX ADMIN — Medication 2 MILLIGRAM(S): at 06:08

## 2019-12-16 RX ADMIN — PANTOPRAZOLE SODIUM 40 MILLIGRAM(S): 20 TABLET, DELAYED RELEASE ORAL at 06:13

## 2019-12-16 RX ADMIN — Medication 1 SPRAY(S): at 06:08

## 2019-12-16 RX ADMIN — Medication 5 MILLIGRAM(S): at 17:53

## 2019-12-16 RX ADMIN — MONTELUKAST 10 MILLIGRAM(S): 4 TABLET, CHEWABLE ORAL at 15:58

## 2019-12-16 RX ADMIN — Medication 2 MILLIGRAM(S): at 17:54

## 2019-12-16 RX ADMIN — PREGABALIN 1000 MICROGRAM(S): 225 CAPSULE ORAL at 17:53

## 2019-12-16 RX ADMIN — CEFEPIME 100 MILLIGRAM(S): 1 INJECTION, POWDER, FOR SOLUTION INTRAMUSCULAR; INTRAVENOUS at 17:53

## 2019-12-16 RX ADMIN — PIPERACILLIN AND TAZOBACTAM 25 GRAM(S): 4; .5 INJECTION, POWDER, LYOPHILIZED, FOR SOLUTION INTRAVENOUS at 06:08
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
## 2019-12-16 NOTE — BEHAVIORAL HEALTH ASSESSMENT NOTE - OTHER
Pt with subacute to chronic symptomatic mood/psychosis in the setting of loss of psychiatric provider, loss of medication access and decline in respiratory health.

## 2019-12-16 NOTE — PHYSICAL THERAPY INITIAL EVALUATION ADULT - ACTIVE RANGE OF MOTION EXAMINATION, REHAB EVAL
bilateral upper extremity Active ROM was WFL (within functional limits)/hips wfl's aarom , knees L 0- 50 degrees , R 0-40 degrees aarom

## 2019-12-16 NOTE — PHYSICAL THERAPY INITIAL EVALUATION ADULT - ADDITIONAL COMMENTS
pt lives with spouse in house (Panfilo [phone]) as well as son with developmental disabilities ; pt retired worked in [employer] as manager pt lives with spouse in apt + elevator (spouse =Panfilo [phone]) as well as son with developmental disabilities ; pt retired worked in [employer] as manager; pt decline ID caregiver ; pt need some assist OOB , once on feet cg of 1 to min of 1 at RW a fair distance per pt PTA

## 2019-12-16 NOTE — PHYSICAL THERAPY INITIAL EVALUATION ADULT - GAIT DEVIATIONS NOTED, PT EVAL
decreased step length/decreased ita/decreased weight-shifting ability/decreased stride length/increased time in double stance
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
## 2019-12-16 NOTE — BEHAVIORAL HEALTH ASSESSMENT NOTE - NSBHCONSULTFOLLOWAFTERCARE_PSY_A_CORE FT
if f/u required at d/c, may f/u at [facility] Adult Outpatient Psychiatry Department- [phone] or [facility]- [phone]
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
## 2019-12-16 NOTE — PHYSICAL THERAPY INITIAL EVALUATION ADULT - PRECAUTIONS/LIMITATIONS, REHAB EVAL
vision precautions/69 F PMH  Anxiety, HTN, HLD , NOEMY, asthma, COPD, DVT no longer on a/c , remote Breast Cancer, Kidney cancer  in remission , Graves disease, Hypothyroid , S/P laminectomy , carpal tunnel syndrome, Obesity, multiple admissions for sob in past attributed to anxiety/panic attacks, now p/w b/l LE edema and pain. Hx is limited at this time as pt is lethargic with flat affect; no family at bedside, call to  not answered.  Per chart, it appears she was brought in for LE swelling and redness b/l . Pt at this time only states "I am cold." and repeats this as the reason for admission.  She is following simple commands and is asking to be left alone.  She denies cp, sob, f/c, n/v/d, dysuria, cough.Last admit 11/2019 for sob. Had largely negative workup: nondx cxr, MRa/MRI brain, duplex b/l LE (neg dvt but +subQ edema), nondx duplex b/l carotids. Given course of steroids/nebs, followed by psych for anxiety meds titration. Course c/b transient R eye visual loss, seen by Neuro Dr. Parada, recommended temp artery bx which pt refused. Course also c/b L distal hand weakness with concern for cervical radiculopathy or nerve entrapment, f/u with neuro recommended. fall precautions/69 F PMH  Anxiety, HTN, HLD , NOEMY, asthma, COPD, DVT no longer on a/c , remote Breast Cancer, Kidney cancer  in remission , Graves disease, Hypothyroid , S/P laminectomy , carpal tunnel syndrome, Obesity, multiple admissions for sob in past attributed to anxiety/panic attacks, now p/w b/l LE edema and pain. Hx is limited at this time as pt is lethargic with flat affect; no family at bedside, call to  not answered.  Per chart, it appears she was brought in for LE swelling and redness b/l . Pt at this time only states "I am cold." and repeats this as the reason for admission.  She is following simple commands and is asking to be left alone.  She denies cp, sob, f/c, n/v/d, dysuria, cough.Last admit 11/2019 for sob. Had largely negative workup: nondx cxr, MRa/MRI brain, duplex b/l LE (neg dvt but +subQ edema), nondx duplex b/l carotids. Given course of steroids/nebs, followed by psych for anxiety meds titration. Course c/b transient R eye visual loss, seen by Neuro Dr. Parada, recommended temp artery bx which pt refused. Course also c/b L distal hand weakness with concern for cervical radiculopathy or nerve entrapment, f/u with neuro recommended./vision precautions/oxygen therapy device and L/min

## 2019-12-16 NOTE — PHYSICAL THERAPY INITIAL EVALUATION ADULT - GENERAL OBSERVATIONS, REHAB EVAL
pt received in bed HOb 45 degrees on 2 L nc o2 top rails up and 1 sidrail table in front call bell,phone in reach , + ext urinary device to wall suction ; pt agreeable for PT

## 2019-12-16 NOTE — PROGRESS NOTE BEHAVIORAL HEALTH - NSBHCHARTREVIEWIMAGING_PSY_A_CORE FT
Xray of b/l tibia and fibula:    IMPRESSION:  Diffuse soft tissue swelling bilaterally. No radiographic evidence of   osteomyelitis.

## 2019-12-16 NOTE — PHYSICAL THERAPY INITIAL EVALUATION ADULT - IMPAIRED TRANSFERS: SIT/STAND, REHAB EVAL
impaired balance/decreased strength/decreased sensation/impaired sensory feedback/impaired postural control/mod of 1 with bed height elevated then pt walk legs back so there under her when stand ; work on wt shift and balance mod of 1 mod unsteady

## 2019-12-16 NOTE — PHYSICAL THERAPY INITIAL EVALUATION ADULT - IMPAIRMENTS CONTRIBUTING TO GAIT DEVIATIONS, PT EVAL
impaired balance/impaired sensory feedback/decreased sensation/impaired postural control/decreased strength/vc to stand upright , mod unsteady , vc for sequencing

## 2019-12-16 NOTE — PHYSICAL THERAPY INITIAL EVALUATION ADULT - IMPAIRMENTS FOUND, PT EVAL
forgetful at times/gait, locomotion, and balance/aerobic capacity/endurance/visual motor/muscle strength/sensory integrity/ventilation and respiration/gas exchange

## 2019-12-16 NOTE — PHYSICAL THERAPY INITIAL EVALUATION ADULT - IMPAIRMENTS CONTRIBUTING IMPAIRED BED MOBILITY, REHAB EVAL
impaired balance/impaired postural control/impaired sensory feedback/decreased sensation/sat x 10 min work on weight shift and balance , mod initially then min of 1/decreased strength

## 2019-12-16 NOTE — BEHAVIORAL HEALTH ASSESSMENT NOTE - SUMMARY
69 F with PPHX of delusional disorder and anxiety disorder with multiple psychiatric admissions and PMH  Anxiety, minimally ambulatory with use of walker,  HTN, HLD , NOEMY, asthma, COPD, DVT no longer on a/c , remote Breast Cancer, Kidney cancer  in remission , Graves disease, Hypothyroid , S/P laminectomy , carpal tunnel syndrome, Obesity, multiple admissions for sob in past attributed to anxiety/panic attacks, now p/w b/l LE edema and pain.   She complains of dyspnea and is admitted medically for cellulitis of the leg and UTI.  Psychiatry is consulted due to history of anxiety and current anxiety and tearfulness in ED as a result of her medical admission.    She presents disheveled and unkempt with high anxiety, paranoia and delusions of persecution from her , suspected olfactory hallucinations related to perfumes and fear of death from medical illness. Continues to be agitated and with persecutory delusions of family/HHA trying to hurt her.

## 2019-12-16 NOTE — PROGRESS NOTE BEHAVIORAL HEALTH - SUMMARY
69 F with PPHX of delusional disorder and anxiety disorder with multiple psychiatric admissions and PMH  Anxiety, minimally ambulatory with use of walker,  HTN, HLD , NOEMY, asthma, COPD, DVT no longer on a/c , remote Breast Cancer, Kidney cancer  in remission , Graves disease, Hypothyroid , S/P laminectomy , carpal tunnel syndrome, Obesity, multiple admissions for sob in past attributed to anxiety/panic attacks, now p/w b/l LE edema and pain.   She complains of dyspnea and is admitted medically for cellulitis of the leg and UTI.  Psychiatry is consulted due to history of anxiety and current anxiety and tearfulness in ED as a result of her medical admission.    Originally presented with disheveled and unkempt with high anxiety, paranoia and delusions of persecution from her , suspected olfactory hallucinations related to perfumes and fear of death from medical illness. Today, she continues to be agitated and with persecutory delusions of family/HHA trying to hurt her.    Recommendations:  -c/w zyprexa 5mg PO Q HS  -c/w citalpram 10mg PO Q day  - c/w ativan 0.5mg PO Q 6 hours PRN anxiety 69 F with PPHX of delusional disorder and anxiety disorder with multiple psychiatric admissions and PMH  Anxiety, minimally ambulatory with use of walker,  HTN, HLD , NOEMY, asthma, COPD, DVT no longer on a/c , remote Breast Cancer, Kidney cancer  in remission , Graves disease, Hypothyroid , S/P laminectomy , carpal tunnel syndrome, Obesity, multiple admissions for sob in past attributed to anxiety/panic attacks, now p/w b/l LE edema and pain.   She complains of dyspnea and is admitted medically for cellulitis of the leg and UTI.  Psychiatry is consulted due to history of anxiety and current anxiety and tearfulness in ED as a result of her medical admission.    Originally presented with disheveled and unkempt with high anxiety, paranoia and delusions of persecution from her , suspected olfactory hallucinations related to perfumes and fear of death from medical illness. Today, she continues to be agitated and with persecutory delusions of family/HHA trying to hurt her.  No SI/HI.    Recommendations:  -c/w zyprexa 5mg PO Q HS  -c/w citalpram 10mg PO Q day  - c/w ativan 0.5mg PO Q 6 hours PRN anxiety

## 2019-12-16 NOTE — BEHAVIORAL HEALTH ASSESSMENT NOTE - SUICIDE RISK FACTORS
Agitation/Severe Anxiety/Panic/Mood Disorder current/past/Psychotic disorder current/past/Unable to engage in safety planning/Recent onset of current/past psychiatric diagnosis

## 2019-12-16 NOTE — PHYSICAL THERAPY INITIAL EVALUATION ADULT - DISCHARGE DISPOSITION, PT EVAL
rehabilitation facility/PT recommend subacute rehab to increase fxl mobility and adl's ; if pt and spouse decide to take pt home pt can benefit from Home PT and needs assist all fxl activity and adl's , PLD for OOB

## 2019-12-16 NOTE — PHYSICAL THERAPY INITIAL EVALUATION ADULT - PERTINENT HX OF CURRENT PROBLEM, REHAB EVAL
12/14/19 B Doppler LE's (-) ; HCT 12/15/19 (-) for acute event , mild atrophy w/ ventricles and sulcal prominence , sm vessel white matter ischemic change ; XRAY Tibia/ Fibula B : w/o OM , L mod OA knee medial Tibiofemoral compartment , R h/o ORIF no acute fx or dislocation , soft tissue diffuse swelling R knee ; CXR 12/14/19 sm L pleural effusion ; pt with olfactory hallucinations related to perfumes and fear of death , psychiatry following case, pt has not seen her outside psychiatrist 6 mo

## 2019-12-16 NOTE — CONSULT NOTE ADULT - SUBJECTIVE AND OBJECTIVE BOX
HPI:   Patient is a 69y female was brought to the hospital because her  called an ambulance because patient has been 'popping ativan like candy', acting very paranoid and screaming that someone is trying to hurt her and she ran out of all of her oxycontin that was just filled on Dec 7. He reports that the ems worked told him her legs were infected but he did not notice this. Patient had a recent stay at [facility] for report of right eye visual loss and patient had been told to have a temporal artery bx and mri was unrevealing. The patient herself does not know why she is in the hospital but does admit that her left leg has been oozing and she used to wean uuna boots but not lately. She has had no fever but her wbc was high on admit.     REVIEW OF SYSTEMS:  All other review of systems negative (Comprehensive ROS)    PAST MEDICAL & SURGICAL HISTORY:  Asthma  Sleep apnea  Obesity  DVT (deep venous thrombosis): history of- not presently on A/C  Hypothyroid  COPD (chronic obstructive pulmonary disease)  Breast cancer in situ, left  Kidney cancer, primary, with metastasis from kidney to other site, left: LEft sided- s/p nephrectomy only- no chemo or RT  Graves disease  Anxiety  Hyperlipidemia  Hypertension  S/P laminectomy: now bed and wheelchair ridden with severe pain  History of parathyroidectomy  History of carpal tunnel release: right wrist  History of eye surgery: 7 surgeries secondary to grave&#x27;s disease  History of pelvic surgery: Pelvic Sling  S/P breast biopsy: left  S/p nephrectomy: left  S/P cholecystectomy      Allergies    Grapes (Hives)  No Known Drug Allergies  Peaches (Hives)  shellfish (Hives)    Intolerances        Antimicrobials Day #  :2  piperacillin/tazobactam IVPB.. 3.375 Gram(s) IV Intermittent every 8 hours  vancomycin  IVPB 1000 milliGRAM(s) IV Intermittent every 12 hours    Other Medications:  ALBUTerol    90 MICROgram(s) HFA Inhaler 1 Puff(s) Inhalation every 4 hours  albuterol/ipratropium for Nebulization 3 milliLiter(s) Nebulizer every 6 hours PRN  amLODIPine   Tablet 2.5 milliGRAM(s) Oral daily  atorvastatin 40 milliGRAM(s) Oral at bedtime  baclofen 5 milliGRAM(s) Oral three times a day  cholecalciferol 2000 Unit(s) Oral daily  citalopram 10 milliGRAM(s) Oral daily  clopidogrel Tablet 75 milliGRAM(s) Oral daily  cyanocobalamin 1000 MICROGram(s) Oral daily  enoxaparin Injectable 40 milliGRAM(s) SubCutaneous daily  fluticasone propionate 50 MICROgram(s)/spray Nasal Spray 1 Spray(s) Both Nostrils two times a day  folic acid 1 milliGRAM(s) Oral daily  gabapentin 100 milliGRAM(s) Oral three times a day  levothyroxine 125 MICROGram(s) Oral daily  lidocaine   Patch 1 Patch Transdermal daily  LORazepam     Tablet 0.5 milliGRAM(s) Oral every 4 hours PRN  losartan 100 milliGRAM(s) Oral daily  melatonin 3 milliGRAM(s) Oral at bedtime  montelukast 10 milliGRAM(s) Oral daily  OLANZapine 5 milliGRAM(s) Oral at bedtime  oxyCODONE    IR 5 milliGRAM(s) Oral every 6 hours PRN  pantoprazole    Tablet 40 milliGRAM(s) Oral before breakfast  tiotropium 18 MICROgram(s) Capsule 1 Capsule(s) Inhalation daily  trihexyphenidyl 2 milliGRAM(s) Oral two times a day      FAMILY HISTORY:  Family history of thyroid cancer (Child): daughter has thyroid cancer  Family history of heart disease (Sibling): brother has a PPM  Family history of diabetes mellitus (Sibling): siblings  Family history of hypertension: mother and father  Family history of myocardial infarction: mother  at age 67 and father at age 67 of MI&#x27;s      SOCIAL HISTORYx:x  Smoking: [ ]Yes [ ]No  ETOH: [ ]Yes [ ]No  Drug Use: [ ]Yes x[ ]No   [ ] Single[ ]    T(F): 97.5 (19 @ 06:00), Max: 98.5 (12-15-19 @ 15:12)  HR: 51 (19 @ 06:00)  BP: 161/63 (19 @ 06:00)  RR: 18 (19 @ 06:00)  SpO2: 98% (19 @ 06:00)  Wt(kg): --    PHYSICAL EXAM:  General: alert, no acute distress, disheveled  Eyes:  anicteric, no conjunctival injection, no discharge  Oropharynx: no lesions or injection 	  Neck: supple, without adenopathy  Lungs: course  to auscultation  Heart: regular rate and rhythm; no murmur, rubs or gallops  Abdomen: soft, nondistended, nontender, without mass or organomegaly  Skin: no lesions  Extremities: bilateral ecchymosis and edema of legs. left pretibial wound that is purulent, no crepitus or induration. legs are a bit tender.   Neurologic: alert, somewhat confused moves all extremities  patient can see out of right eye when left is covered  LAB RESULTS:                        10.8   13.49 )-----------( 131      ( 16 Dec 2019 08:15 )             36.6         144  |  106  |  18  ----------------------------<  98  3.5   |  29  |  0.63    Ca    8.7      16 Dec 2019 06:16  Phos  2.2     12-15  Mg     2.2     12-15    TPro  5.4<L>  /  Alb  3.2<L>  /  TBili  0.7  /  DBili  x   /  AST  29  /  ALT  47<H>  /  AlkPhos  58  12-15    LIVER FUNCTIONS - ( 15 Dec 2019 09:07 )  Alb: 3.2 g/dL / Pro: 5.4 g/dL / ALK PHOS: 58 U/L / ALT: 47 U/L / AST: 29 U/L / GGT: x           Urinalysis Basic - ( 15 Dec 2019 00:32 )    Color: Yellow / Appearance: Clear / S.029 / pH: x  Gluc: x / Ketone: Negative  / Bili: Negative / Urobili: Negative   Blood: x / Protein: Trace / Nitrite: Positive   Leuk Esterase: Negative / RBC: 7 /hpf / WBC 5 /HPF   Sq Epi: x / Non Sq Epi: 2 /hpf / Bacteria: Few        MICROBIOLOGY:  RECENT CULTURES:  12-15 @ 03:30 .Urine Catheterized     50,000 - 99,000 CFU/mL Gram Negative Rods  <10,000 CFU/ml Normal Urogenital love present      12-15 @ 03:28 .Blood Blood     No growth to date.            RADIOLOGY REVIEWED:  < from: [facility] Duplex Lower Ext Vein Scan, Bilat (19 @ 09:38) >  IMPRESSION:     No evidence of deep venous thrombosis in either lower extremity above the   knee.    < from: MR Head No Cont (19 @ 10:44) >    EXAM:  MR BRAIN                            PROCEDURE DATE:  2019          INTERPRETATION:  INDICATION:    TIA. Stroke.  TECHNIQUE:  Multiplanar brain imaging was conducted using a 1.5 Marlen   magnet.  T1, T2 and FLAIR imaging was performed.  In addition, diffusion   imaging, diffusion coefficient assessment (ADC) and echo planar T2* was   incorporated. No contrast administered    COMPARISON EXAMINATION:  MR dated 5/15/2017.    FINDINGS:  HEMISPHERES:  No diffusion restriction or acute ischemia is noted. No   space-occupying lesion is identified. There are mild involutional changes   commensurate with age, seen within the white matter. No microhemorrhage   or mass is noted.  VENTRICLES:  midline and normal in size  POSTERIOR FOSSA:  The brain stem and cerebellum are unremarkable in   appearance.  No CP angle abnormality is noted.  EXTRA-AXIAL:  No mass or collections are depicted.  CRANIAL NERVES:  No abnormality noted.  VASCULATURE:  The major vessels and venous sinuses are grossly patent.    SINUSES AND MASTOIDS:  Opacification is noted of the frontal sinuses,   along with ethmoid thickening right greater than left. Also there is a   bilateral mastoid partial opacification right greater than left.  MISCELLANEOUS:  A scalp lipoma is noted in the right the frontal   supraorbital region.    IMPRESSION:      1)  unremarkable MRI study of the brain. No diffusion restriction or MR   evidence of acute ischemia. No space-occupying lesion noted..  2)  chronic inflammatory changes noted in the sinuses and in the mastoid   cells, right greater than left..       < end of copied text >      < end of copied text >  Sedimentation Rate, Erythrocyte (19 @ 23:55)    Sedimentation Rate, Erythrocyte: 5 mm/hr            Impression:  Patient with stasis ulcer of the left leg with some superinfection at present. I do not actually see an active cellulitis at present. She had right eye visual loss that seems better, esr has not been very high but defer to neuro and primary if further evaluation for temporal arteritis is indicated. Has positive urine culture but ua is negative for significant pyuria and no symptoms of uti so will not treat specifically for a uti.     Recommendations:  will tailor antibiotics to cefepime alone for cellulitis/superinfected ulcer on left leg  wound care team evaluation  consider neurology re evaluation, psychiatry follow up  follow up cultures  control edema

## 2019-12-16 NOTE — BEHAVIORAL HEALTH ASSESSMENT NOTE - ACTIVATING EVENTS/STRESSORS
Non-compliant or not receiving treatment/Change in provider or treatment (i.e., medications, psychotherapy, milieu)/Acute medical problem

## 2019-12-17 ENCOUNTER — TRANSCRIPTION ENCOUNTER (OUTPATIENT)
Age: 69
End: 2019-12-17

## 2019-12-17 LAB
-  AMIKACIN: SIGNIFICANT CHANGE UP
-  AMPICILLIN/SULBACTAM: SIGNIFICANT CHANGE UP
-  AMPICILLIN: SIGNIFICANT CHANGE UP
-  AZTREONAM: SIGNIFICANT CHANGE UP
-  CEFAZOLIN: SIGNIFICANT CHANGE UP
-  CEFEPIME: SIGNIFICANT CHANGE UP
-  CEFOXITIN: SIGNIFICANT CHANGE UP
-  CEFTRIAXONE: SIGNIFICANT CHANGE UP
-  CIPROFLOXACIN: SIGNIFICANT CHANGE UP
-  ERTAPENEM: SIGNIFICANT CHANGE UP
-  GENTAMICIN: SIGNIFICANT CHANGE UP
-  IMIPENEM: SIGNIFICANT CHANGE UP
-  LEVOFLOXACIN: SIGNIFICANT CHANGE UP
-  MEROPENEM: SIGNIFICANT CHANGE UP
-  NITROFURANTOIN: SIGNIFICANT CHANGE UP
-  PIPERACILLIN/TAZOBACTAM: SIGNIFICANT CHANGE UP
-  TIGECYCLINE: SIGNIFICANT CHANGE UP
-  TOBRAMYCIN: SIGNIFICANT CHANGE UP
-  TRIMETHOPRIM/SULFAMETHOXAZOLE: SIGNIFICANT CHANGE UP
ALBUMIN SERPL ELPH-MCNC: 2.9 G/DL — LOW (ref 3.3–5)
ALP SERPL-CCNC: 59 U/L — SIGNIFICANT CHANGE UP (ref 40–120)
ALT FLD-CCNC: 27 U/L — SIGNIFICANT CHANGE UP (ref 10–45)
ANION GAP SERPL CALC-SCNC: 12 MMOL/L — SIGNIFICANT CHANGE UP (ref 5–17)
APPEARANCE UR: CLEAR — SIGNIFICANT CHANGE UP
AST SERPL-CCNC: 9 U/L — LOW (ref 10–40)
BACTERIA # UR AUTO: NEGATIVE — SIGNIFICANT CHANGE UP
BASOPHILS # BLD AUTO: 0.03 K/UL — SIGNIFICANT CHANGE UP (ref 0–0.2)
BASOPHILS NFR BLD AUTO: 0.2 % — SIGNIFICANT CHANGE UP (ref 0–2)
BILIRUB SERPL-MCNC: 0.9 MG/DL — SIGNIFICANT CHANGE UP (ref 0.2–1.2)
BILIRUB UR-MCNC: NEGATIVE — SIGNIFICANT CHANGE UP
BUN SERPL-MCNC: 14 MG/DL — SIGNIFICANT CHANGE UP (ref 7–23)
CALCIUM SERPL-MCNC: 8.7 MG/DL — SIGNIFICANT CHANGE UP (ref 8.4–10.5)
CHLORIDE SERPL-SCNC: 103 MMOL/L — SIGNIFICANT CHANGE UP (ref 96–108)
CO2 SERPL-SCNC: 26 MMOL/L — SIGNIFICANT CHANGE UP (ref 22–31)
COLOR SPEC: SIGNIFICANT CHANGE UP
CREAT SERPL-MCNC: 0.66 MG/DL — SIGNIFICANT CHANGE UP (ref 0.5–1.3)
CULTURE RESULTS: SIGNIFICANT CHANGE UP
DIFF PNL FLD: NEGATIVE — SIGNIFICANT CHANGE UP
EOSINOPHIL # BLD AUTO: 0.29 K/UL — SIGNIFICANT CHANGE UP (ref 0–0.5)
EOSINOPHIL NFR BLD AUTO: 1.9 % — SIGNIFICANT CHANGE UP (ref 0–6)
EPI CELLS # UR: 0 /HPF — SIGNIFICANT CHANGE UP (ref 0–5)
GLUCOSE SERPL-MCNC: 133 MG/DL — HIGH (ref 70–99)
GLUCOSE UR QL: NEGATIVE — SIGNIFICANT CHANGE UP
HCT VFR BLD CALC: 37.1 % — SIGNIFICANT CHANGE UP (ref 34.5–45)
HGB BLD-MCNC: 11.3 G/DL — LOW (ref 11.5–15.5)
HYALINE CASTS # UR AUTO: 0 /LPF — SIGNIFICANT CHANGE UP (ref 0–7)
IMM GRANULOCYTES NFR BLD AUTO: 0.7 % — SIGNIFICANT CHANGE UP (ref 0–1.5)
KETONES UR-MCNC: NEGATIVE — SIGNIFICANT CHANGE UP
LEUKOCYTE ESTERASE UR-ACNC: ABNORMAL
LYMPHOCYTES # BLD AUTO: 1.5 K/UL — SIGNIFICANT CHANGE UP (ref 1–3.3)
LYMPHOCYTES # BLD AUTO: 9.7 % — LOW (ref 13–44)
MCHC RBC-ENTMCNC: 27.6 PG — SIGNIFICANT CHANGE UP (ref 27–34)
MCHC RBC-ENTMCNC: 30.5 GM/DL — LOW (ref 32–36)
MCV RBC AUTO: 90.7 FL — SIGNIFICANT CHANGE UP (ref 80–100)
METHOD TYPE: SIGNIFICANT CHANGE UP
MONOCYTES # BLD AUTO: 1.06 K/UL — HIGH (ref 0–0.9)
MONOCYTES NFR BLD AUTO: 6.9 % — SIGNIFICANT CHANGE UP (ref 2–14)
NEUTROPHILS # BLD AUTO: 12.47 K/UL — HIGH (ref 1.8–7.4)
NEUTROPHILS NFR BLD AUTO: 80.6 % — HIGH (ref 43–77)
NITRITE UR-MCNC: NEGATIVE — SIGNIFICANT CHANGE UP
ORGANISM # SPEC MICROSCOPIC CNT: SIGNIFICANT CHANGE UP
ORGANISM # SPEC MICROSCOPIC CNT: SIGNIFICANT CHANGE UP
PH UR: 7 — SIGNIFICANT CHANGE UP (ref 5–8)
PLATELET # BLD AUTO: 124 K/UL — LOW (ref 150–400)
POTASSIUM SERPL-MCNC: 3.4 MMOL/L — LOW (ref 3.5–5.3)
POTASSIUM SERPL-SCNC: 3.4 MMOL/L — LOW (ref 3.5–5.3)
PROT SERPL-MCNC: 5.4 G/DL — LOW (ref 6–8.3)
PROT UR-MCNC: SIGNIFICANT CHANGE UP
RBC # BLD: 4.09 M/UL — SIGNIFICANT CHANGE UP (ref 3.8–5.2)
RBC # FLD: 16.5 % — HIGH (ref 10.3–14.5)
RBC CASTS # UR COMP ASSIST: 3 /HPF — SIGNIFICANT CHANGE UP (ref 0–4)
SODIUM SERPL-SCNC: 141 MMOL/L — SIGNIFICANT CHANGE UP (ref 135–145)
SP GR SPEC: 1.01 — SIGNIFICANT CHANGE UP (ref 1.01–1.02)
SPECIMEN SOURCE: SIGNIFICANT CHANGE UP
UROBILINOGEN FLD QL: SIGNIFICANT CHANGE UP
WBC # BLD: 15.46 K/UL — HIGH (ref 3.8–10.5)
WBC # FLD AUTO: 15.46 K/UL — HIGH (ref 3.8–10.5)
WBC UR QL: 2 /HPF — SIGNIFICANT CHANGE UP (ref 0–5)

## 2019-12-17 PROCEDURE — 99232 SBSQ HOSP IP/OBS MODERATE 35: CPT | Mod: GC

## 2019-12-17 RX ORDER — ACETAMINOPHEN 500 MG
650 TABLET ORAL EVERY 6 HOURS
Refills: 0 | Status: DISCONTINUED | OUTPATIENT
Start: 2019-12-17 | End: 2019-12-26

## 2019-12-17 RX ORDER — MEROPENEM 1 G/30ML
500 INJECTION INTRAVENOUS EVERY 8 HOURS
Refills: 0 | Status: DISCONTINUED | OUTPATIENT
Start: 2019-12-17 | End: 2019-12-18

## 2019-12-17 RX ORDER — POTASSIUM CHLORIDE 20 MEQ
40 PACKET (EA) ORAL ONCE
Refills: 0 | Status: COMPLETED | OUTPATIENT
Start: 2019-12-17 | End: 2019-12-17

## 2019-12-17 RX ADMIN — Medication 0.5 MILLIGRAM(S): at 10:54

## 2019-12-17 RX ADMIN — OXYCODONE HYDROCHLORIDE 5 MILLIGRAM(S): 5 TABLET ORAL at 11:31

## 2019-12-17 RX ADMIN — Medication 5 MILLIGRAM(S): at 05:24

## 2019-12-17 RX ADMIN — Medication 650 MILLIGRAM(S): at 16:32

## 2019-12-17 RX ADMIN — LIDOCAINE 1 PATCH: 4 CREAM TOPICAL at 05:23

## 2019-12-17 RX ADMIN — OXYCODONE HYDROCHLORIDE 5 MILLIGRAM(S): 5 TABLET ORAL at 17:18

## 2019-12-17 RX ADMIN — CEFEPIME 100 MILLIGRAM(S): 1 INJECTION, POWDER, FOR SOLUTION INTRAMUSCULAR; INTRAVENOUS at 05:24

## 2019-12-17 RX ADMIN — Medication 3 MILLILITER(S): at 05:25

## 2019-12-17 RX ADMIN — ATORVASTATIN CALCIUM 40 MILLIGRAM(S): 80 TABLET, FILM COATED ORAL at 22:05

## 2019-12-17 RX ADMIN — Medication 40 MILLIEQUIVALENT(S): at 15:59

## 2019-12-17 RX ADMIN — Medication 0.5 MILLIGRAM(S): at 06:35

## 2019-12-17 RX ADMIN — OXYCODONE HYDROCHLORIDE 5 MILLIGRAM(S): 5 TABLET ORAL at 12:01

## 2019-12-17 RX ADMIN — PANTOPRAZOLE SODIUM 40 MILLIGRAM(S): 20 TABLET, DELAYED RELEASE ORAL at 06:35

## 2019-12-17 RX ADMIN — OLANZAPINE 5 MILLIGRAM(S): 15 TABLET, FILM COATED ORAL at 22:05

## 2019-12-17 RX ADMIN — Medication 2 MILLIGRAM(S): at 17:18

## 2019-12-17 RX ADMIN — GABAPENTIN 100 MILLIGRAM(S): 400 CAPSULE ORAL at 12:48

## 2019-12-17 RX ADMIN — LOSARTAN POTASSIUM 100 MILLIGRAM(S): 100 TABLET, FILM COATED ORAL at 05:24

## 2019-12-17 RX ADMIN — MEROPENEM 100 MILLIGRAM(S): 1 INJECTION INTRAVENOUS at 22:06

## 2019-12-17 RX ADMIN — Medication 5 MILLIGRAM(S): at 12:48

## 2019-12-17 RX ADMIN — Medication 650 MILLIGRAM(S): at 15:59

## 2019-12-17 RX ADMIN — Medication 0.5 MILLIGRAM(S): at 16:31

## 2019-12-17 RX ADMIN — GABAPENTIN 100 MILLIGRAM(S): 400 CAPSULE ORAL at 05:24

## 2019-12-17 RX ADMIN — OXYCODONE HYDROCHLORIDE 5 MILLIGRAM(S): 5 TABLET ORAL at 05:24

## 2019-12-17 RX ADMIN — CLOPIDOGREL BISULFATE 75 MILLIGRAM(S): 75 TABLET, FILM COATED ORAL at 12:47

## 2019-12-17 RX ADMIN — Medication 2 MILLIGRAM(S): at 05:24

## 2019-12-17 RX ADMIN — Medication 2000 UNIT(S): at 12:47

## 2019-12-17 RX ADMIN — Medication 125 MICROGRAM(S): at 05:24

## 2019-12-17 RX ADMIN — Medication 1 MILLIGRAM(S): at 12:47

## 2019-12-17 RX ADMIN — ENOXAPARIN SODIUM 40 MILLIGRAM(S): 100 INJECTION SUBCUTANEOUS at 12:47

## 2019-12-17 RX ADMIN — Medication 0.5 MILLIGRAM(S): at 22:04

## 2019-12-17 RX ADMIN — OXYCODONE HYDROCHLORIDE 5 MILLIGRAM(S): 5 TABLET ORAL at 17:48

## 2019-12-17 RX ADMIN — OXYCODONE HYDROCHLORIDE 5 MILLIGRAM(S): 5 TABLET ORAL at 05:54

## 2019-12-17 RX ADMIN — AMLODIPINE BESYLATE 2.5 MILLIGRAM(S): 2.5 TABLET ORAL at 05:24

## 2019-12-17 RX ADMIN — CITALOPRAM 10 MILLIGRAM(S): 10 TABLET, FILM COATED ORAL at 12:47

## 2019-12-17 RX ADMIN — GABAPENTIN 100 MILLIGRAM(S): 400 CAPSULE ORAL at 22:05

## 2019-12-17 RX ADMIN — PREGABALIN 1000 MICROGRAM(S): 225 CAPSULE ORAL at 12:48

## 2019-12-17 RX ADMIN — Medication 5 MILLIGRAM(S): at 22:05

## 2019-12-17 RX ADMIN — Medication 3 MILLIGRAM(S): at 22:05

## 2019-12-17 RX ADMIN — MONTELUKAST 10 MILLIGRAM(S): 4 TABLET, CHEWABLE ORAL at 12:47

## 2019-12-17 NOTE — DISCHARGE NOTE NURSING/CASE MANAGEMENT/SOCIAL WORK - PATIENT PORTAL LINK FT
You can access the FollowMyHealth Patient Portal offered by [facility] by registering at the following website: [email or web address]. By joining [facility]’s FollowMyHealth portal, you will also be able to view your health information using other applications (apps) compatible with our system.

## 2019-12-17 NOTE — PROGRESS NOTE ADULT - SUBJECTIVE AND OBJECTIVE BOX
CC: f/u for fever, leukocytosis    Patient reports  she is upset with her , needs her nebulizer  REVIEW OF SYSTEMS:  All other review of systems negative (Comprehensive ROS)    Antimicrobials Day #  :1  meropenem  IVPB 500 milliGRAM(s) IV Intermittent every 8 hours    Other Medications Reviewed    T(F): 102.3 (12-17-19 @ 15:00), Max: 102.3 (12-17-19 @ 15:00)  HR: 106 (12-17-19 @ 15:00)  BP: 149/70 (12-17-19 @ 15:00)  RR: 18 (12-17-19 @ 15:00)  SpO2: 94% (12-17-19 @ 15:00)  Wt(kg): --    PHYSICAL EXAM:  General: alert, very anxious acute distress  Eyes:  anicteric, no conjunctival injection, no discharge  Oropharynx: no lesions or injection 	  Neck: supple, without adenopathy  Lungs: some rhonchi, no wheeze  Heart: regular rate and rhythm; no murmur, rubs or gallops  Abdomen: soft, nondistended, nontender, without mass or organomegaly  Skin: no lesions  Extremities: no clubbing, cyanosis. legs with edema, ulcer left shin, ecchymosis behind legs  Neurologic: alert, oriented, , pressured speech, somewhat tangential, flight of ideas moves all extremities    LAB RESULTS:                        11.3   15.46 )-----------( 124      ( 17 Dec 2019 09:11 )             37.1     12-17    141  |  103  |  14  ----------------------------<  133<H>  3.4<L>   |  26  |  0.66    Ca    8.7      17 Dec 2019 06:44    TPro  5.4<L>  /  Alb  2.9<L>  /  TBili  0.9  /  DBili  x   /  AST  9<L>  /  ALT  27  /  AlkPhos  59  12-17    LIVER FUNCTIONS - ( 17 Dec 2019 06:44 )  Alb: 2.9 g/dL / Pro: 5.4 g/dL / ALK PHOS: 59 U/L / ALT: 27 U/L / AST: 9 U/L / GGT: x             MICROBIOLOGY:  RECENT CULTURES:  12-15 @ 03:30 .Urine Catheterized Escherichia coli ESBL    50,000 - 99,000 CFU/mL Escherichia coli ESBL  <10,000 CFU/ml Normal Urogenital love present      12-15 @ 03:28 .Blood Blood     No growth to date.          RADIOLOGY REVIEWED:    < from: [facility] Duplex Lower Ext Vein Scan, Bilat (12.16.19 @ 09:38) >    IMPRESSION:     No evidence of deep venous thrombosis in either lower extremity above the   knee.  < from: CT Head No Cont (12.15.19 @ 16:41) >  Impression: Limited by out of field artifact. No significant change since   11/11/2019. Atrophy.      < end of copied text >    Assessment:  Patient with asthma, edema, extreme anxiety, admitted after  called ambulance for patient being erratic and popping her pain and anxiety meds. Today she had fever to 102, higher wbc and lower plt raises concern for uncontrolled infection. I am not convinced the legs are source and it is very hard to assess her by exam and symptoms. She does not have much pyuria but is growing esbl coliform so will broaden to meropenem  Plan:  repeat cultures  start meropenem  local care to wounds  ct chest abd and pelvis

## 2019-12-17 NOTE — PROGRESS NOTE ADULT - ASSESSMENT
Patient : Amalia Benz Age: 56 year old Sex: female   MRN: [id number] Encounter Date: 9/10/2018      History     Chief Complaint   Patient presents with   • Calf Pain     Pt comes in with (B) leg cramping that has been present for the past 3 weeks on and off. She reports that she had cortisone shots in the foot and has had sxs since then. She denies any sob with these and no redness or erythema of the right or left leg noted. She reports pain is worse at night, she denies any trauma to the area. No meds taken prior to arrival, able to ambulate without difficulty.       The history is provided by the patient.   Calf Pain    This is a new problem. The current episode started more than 1 week ago. The onset was gradual. The problem has been unchanged. The pain is mild. Pertinent negatives include no congestion, no weakness and no rash.       No Known Allergies    Prior to Admission Medications    CHOLECALCIFEROL (VITAMIN D3) 1000 UNITS TABLET    Take 3,000 Units by mouth daily.    LISINOPRIL-HYDROCHLOROTHIAZIDE (PRINZIDE,ZESTORETIC) 10-12.5 MG PER TABLET    1/2 TAB QAM    METFORMIN (GLUCOPHAGE) 500 MG TABLET    Take 1 tablet by mouth daily (with breakfast).    PHENTERMINE 15 MG CAPSULE    Take 1 capsule by mouth every morning.    TRIAMTERENE-HYDROCHLOROTHIAZIDE (DYAZIDE) 37.5-25 MG PER CAPSULE    Take 1 capsule by mouth daily.       New Prescriptions    No medications on file       Past Medical History:   Diagnosis Date   • Anxiety    • Arthritis    • Depression    • Essential (primary) hypertension    • Neuralgia 2014   • Polyarthralgia 2014       Past Surgical History:   Procedure Laterality Date   • CARPAL TUNNEL RELEASE Left    •  SECTION, CLASSIC     • COSMETIC SURGERY      keloid   • COSMETIC SURGERY      neck birth danna removal   • DEXA BONE DENSITY AXIAL SKELETON  2006   • GANGLION CYST EXCISION      late \" left    • TONSILLECTOMY AND ADENOIDECTOMY         Family History   Problem  Relation Age of Onset   • Diabetes Mother    • High cholesterol Mother    • High blood pressure Mother    • Hypertension Mother    • High cholesterol Father    • High blood pressure Father    • Heart disease Father         cad 80s   • Hypertension Father        Social History   Substance Use Topics   • Smoking status: Former Smoker     Packs/day: 0.25     Years: 14.00     Types: Cigarettes     Quit date: 1/1/1992   • Smokeless tobacco: Never Used   • Alcohol use 0.0 oz/week      Comment: rarely       Review of Systems   Constitutional: Negative for chills.   HENT: Negative for congestion.    Eyes: Negative for visual disturbance.   Respiratory: Negative for shortness of breath.    Cardiovascular: Negative for leg swelling.   Musculoskeletal: Negative for gait problem and joint swelling.   Skin: Negative for color change and rash.   Neurological: Negative for weakness.   Psychiatric/Behavioral: The patient is nervous/anxious.        Physical Exam     ED Triage Vitals [09/10/18 1437]   ED Triage Vitals Group      Temp 98.5 °F (36.9 °C)      Pulse 90      Resp 16      /87      SpO2 98 %      EtCO2 mmHg       Height 5' 3\" (1.6 m)      Weight 213 lb 4.8 oz (96.8 kg)      Weight Scale Used ED Actual       Physical Exam   Constitutional: She is oriented to person, place, and time. She appears well-developed and well-nourished.   HENT:   Head: Normocephalic and atraumatic.   Mouth/Throat: Oropharynx is clear and moist.   Eyes: Conjunctivae and EOM are normal.   Cardiovascular: Normal rate and regular rhythm.    No murmur heard.  Pulmonary/Chest: Effort normal and breath sounds normal.   Abdominal: Soft. Bowel sounds are normal.   Musculoskeletal:   Right calf noted to have small hematoma along the posterior calf, no bruising noted, vericose veins noted, good pedal pulses (B), no venous stasis noted (B)   Neurological: She is alert and oriented to person, place, and time.   Skin: Skin is warm.   Psychiatric:   Anxious  _________________________________________________________________________________________  ========>>  M E D I C A L   A T T E N D I N G    F O L L O W  U P  N O T E  <<=========  -----------------------------------------------------------------------------------------------------    - Patient seen and examined by me earlier today.   - In summary,  SHEKHAR VASQUEZ is a 69y year old woman who originally presented with leg infection   - Patient today overall doing ok, comfortable, eating OK.       noted this afternoon pt had fever, blood cultures , abx upgraded, blood cultures sent     ==================>> REVIEW OF SYSTEM <<=================    GEN: no fever, no chills, no pain  RESP: no SOB now, no cough, no sputum  CVS: no chest pain now, no palpitations, + edema, unchanged  ( pt states at home does do ACE wrapping at times)   GI: no abdominal pain, no nausea, no constipation, no diarrhea  : no dysuria, no frequency, no hematuria  Neuro: no headache, no dizziness  Derm : no itching, no rash    ==================>> PHYSICAL EXAM <<=================    GEN: A&O X 3 , NAD , comfortable  HEENT: NCAT, PERRL, MMM, hearing intact  Neck: supple , no JVD  CVS: S1S2 , regular , No M/R/G appreciated  PULM: CTA B/L,  no W/R/R appreciated  ABD.: soft. non tender, non distended,  bowel sounds present  Extrem: intact pulses , + Bilateral LE edema        wound on left leg dressed   PSYCH : a bit anxious       ==================>> MEDICATIONS <<====================    ALBUTerol    90 MICROgram(s) HFA Inhaler 1 Puff(s) Inhalation every 4 hours  amLODIPine   Tablet 2.5 milliGRAM(s) Oral daily  atorvastatin 40 milliGRAM(s) Oral at bedtime  baclofen 5 milliGRAM(s) Oral three times a day  cholecalciferol 2000 Unit(s) Oral daily  citalopram 10 milliGRAM(s) Oral daily  clopidogrel Tablet 75 milliGRAM(s) Oral daily  cyanocobalamin 1000 MICROGram(s) Oral daily  enoxaparin Injectable 40 milliGRAM(s) SubCutaneous daily  fluticasone propionate 50 MICROgram(s)/spray Nasal Spray 1 Spray(s) Both Nostrils two times a day  folic acid 1 milliGRAM(s) Oral daily  gabapentin 100 milliGRAM(s) Oral three times a day  levothyroxine 125 MICROGram(s) Oral daily  lidocaine   Patch 1 Patch Transdermal daily  losartan 100 milliGRAM(s) Oral daily  melatonin 3 milliGRAM(s) Oral at bedtime  meropenem  IVPB 500 milliGRAM(s) IV Intermittent every 8 hours  montelukast 10 milliGRAM(s) Oral daily  OLANZapine 5 milliGRAM(s) Oral at bedtime  pantoprazole    Tablet 40 milliGRAM(s) Oral before breakfast  tiotropium 18 MICROgram(s) Capsule 1 Capsule(s) Inhalation daily  trihexyphenidyl 2 milliGRAM(s) Oral two times a day    MEDICATIONS  (PRN):  acetaminophen   Tablet .. 650 milliGRAM(s) Oral every 6 hours PRN Temp greater or equal to 38C (100.4F)  albuterol/ipratropium for Nebulization 3 milliLiter(s) Nebulizer every 6 hours PRN Shortness of Breath and/or Wheezing  LORazepam     Tablet 0.5 milliGRAM(s) Oral every 4 hours PRN anxiety or agitation  oxyCODONE    IR 5 milliGRAM(s) Oral every 6 hours PRN moderate or severe pain    ==================>> VITAL SIGNS <<==================    Vital Signs Last 24 Hrs  T(C): 36.7 (12-17-19 @ 20:47)  T(F): 98.1 (12-17-19 @ 20:47), Max: 102.3 (12-17-19 @ 15:00)  HR: 99 (12-17-19 @ 20:47) (78 - 106)  BP: 132/69 (12-17-19 @ 20:47)  RR: 17 (12-17-19 @ 20:47) (17 - 18)  SpO2: 96% (12-17-19 @ 20:47) (94% - 100%)       ==================>> LAB AND IMAGING <<==================                        11.3   15.46 )-----------( 124      ( 17 Dec 2019 09:11 )             37.1        WBC count:   15.46 <<== ,  13.49 <<== ,  24.76 <<== ,  21.32 <<== ,  18.27 <<==     141  |  103  |  14  ----------------------------<  133<H>  3.4<L>   |  26  |  0.66    Ca    8.7      17 Dec 2019 06:44    TPro  5.4<L>  /  Alb  2.9<L>  /  TBili  0.9  /  DBili  x   /  AST  9<L>  /  ALT  27  /  AlkPhos  59  12-17    Creatinine:  0.66  <<==, 0.63  <<==, 0.52  <<==, 0.51  <<==, 0.67  <<==  Sodium:   141  <==, 144  <==, 143  <==, 145  <==, 142  <==       AST:          9 <== , 29 <== , 35 <== , 10 <==      ALT:        27  <== , 47  <== , 49  <== , 23  <==      AP:        59  <=, 58  <=, 62  <=, 67  <=     Bili:        0.9  <=, 0.7  <=, 0.6  <=, 0.5  <=    _______________________  C U L T U R E S :    Culture - Urine (collected 15 Dec 2019 03:30)  Source: .Urine Catheterized  Final Report (17 Dec 2019 08:39):    50,000 - 99,000 CFU/mL Escherichia coli ESBL    <10,000 CFU/ml Normal Urogenital love present  Organism: Escherichia coli ESBL (17 Dec 2019 08:39)  Organism: Escherichia coli ESBL (17 Dec 2019 08:39)    Sensitivities:      -  Amikacin: S <=16      -  Ampicillin: R >16 These ampicillin results predict results for amoxicillin      -  Ampicillin/Sulbactam: R 16/8 Enterobacter, Citrobacter, and Serratia may develop resistance during prolonged therapy (3-4 days)      -  Aztreonam: R 16      -  Cefazolin: R >16 (MIC_CL_COM_ENTERIC_CEFAZU) For uncomplicated UTI with K. pneumoniae, E. coli, or P. mirablis: MARIA M <=16 is sensitive and MARIA M >=32 is resistant. This also predicts results for oral agents cefaclor, cefdinir, cefpodoxime, cefprozil, cefuroxime axetil, cephalexin and locarbef for uncomplicated UTI. Note that some isolates may be susceptible to these agents while testing resistant to cefazolin.      -  Cefepime: R >16      -  Cefoxitin: S <=8      -  Ceftriaxone: R >32 Enterobacter, Citrobacter, and Serratia may develop resistance during prolonged therapy      -  Ciprofloxacin: R >2      -  Ertapenem: S <=1      -  Gentamicin: S <=4      -  Imipenem: S <=1      -  Levofloxacin: R >4      -  Meropenem: S <=1      -  Nitrofurantoin: S <=32 Should not be used to treat pyelonephritis      -  Piperacillin/Tazobactam: R <=16      -  Tigecycline: S <=2      -  Tobramycin: S <=4      -  Trimethoprim/Sulfamethoxazole: R >2/38      Method Type: MARIA M    Culture - Blood (collected 15 Dec 2019 03:28)  Source: .Blood Blood-Peripheral  Preliminary Report (16 Dec 2019 04:01):    No growth to date.    Culture - Blood (collected 15 Dec 2019 03:28)  Source: .Blood Blood  Preliminary Report (16 Dec 2019 04:01):    No growth to date.    ___________________________________________________________________________________  ===============>>  A S S E S S M E N T   A N D   P L A N <<===============  ------------------------------------------------------------------------------------------    · Assessment		  69 F PMH  Anxiety, HTN, HLD , NOEMY, asthma, COPD, DVT no longer on a/c , remote Breast Cancer, Kidney cancer  in remission , Graves disease, Hypothyroid , S/P laminectomy , carpal tunnel syndrome, Obesity, multiple admissions for sob in past attributed to anxiety/panic attacks, now p/w b/l LE edema and pain.      Problem/Plan - 1:  ·  Problem: Cellulitis of lower extremity / infected left leg wound   --ID eval and mgmt appreciated   -continue IV abx for now  as bellow   no DVT on duplex  seen   local wound care / wound care team   treat edema with diuretics   leg elevation     Problem/Plan - 2:  ·  Problem:  Bacteriuria  now with high fever  -ID appreciated and discussed   -cultures as above   - abx upgraded given fever    Problem/Plan - 3:  ·  Problem: Mood disorder.  Plan: -Pt with complex anxiety spectrum disorder, paranoid delusion disorder with mood issues as noted on prior extensive behavioral health eval from admission 11/2019.  - psych following  - Continue Current medications and monitor.   - pt has had prior inpatient psych admissions: psych to eval further treatment need and possible inpatient transfer   - social work eval and follow up ( pt has previously made unproven statements about her  )     Problem/Plan - 4:  ·  Problem: Other chronic pain.  Plan: -c/w baclofen  - Continue Current medications and monitor.     Problem/Plan - 5:  ·  Problem: Debility  - PT / OOB as able   -fall, asp, sz prec    Problem/Plan - 6:  Problem: Essential hypertension. Plan: -sbp up to 170s  -c/w home losartan, norvasc.  - cardio following     Problem/Plan - 7:  ·  Problem: Prophylactic measure.  Plan: -lovenox vte ppx.     --------------------------------------------  Case discussed with pt, NP, pt's  at bedside, specialists   Education given on findings and plan of care  ___________________________  H. JANICE Welch.  Pager: [phone] in the ed       ED Course     Procedures    Lab Results     Results for orders placed or performed during the hospital encounter of 09/10/18   Basic Metabolic Panel   Result Value Ref Range    Sodium 140 135 - 145 mmol/L    Potassium 3.7 3.4 - 5.1 mmol/L    Chloride 105 98 - 107 mmol/L    Carbon Dioxide 24 21 - 32 mmol/L    Anion Gap 15 10 - 20 mmol/L    Glucose 101 (H) 65 - 99 mg/dL    BUN 12 6 - 20 mg/dL    Creatinine 0.79 0.51 - 0.95 mg/dL    GFR Estimate,  >90     GFR Estimate, Non African American 83     BUN/Creatinine Ratio 15 7 - 25    CALCIUM 9.3 8.4 - 10.2 mg/dL         Radiology Results     Imaging Results    None         ED Medication Orders     None          MDM  Number of Diagnoses or Management Options  Diagnosis management comments: Pt comes in with (B) calf pain and swelling that has been present for the past 3 weeks, worried that she might have had a blood clot, her right leg shows a psoterior calf hematoma but otherwise no venous stasis noted, from of the legs on exam with good pedal pulses, plan to check BMP and if negative for acute process will discharge home, her exam is not consistent with dvt.      4:14 PM - pts labs are unremarkable, appears in no distress, will discharge home.   Clinical Impression     ED Diagnosis   1. Bilateral calf pain         Disposition        Discharge 9/10/2018  4:14 PM  Amalia Benz discharge to home/self care.                    Aruna Rainey MD  09/10/18 [id number]

## 2019-12-17 NOTE — PROGRESS NOTE BEHAVIORAL HEALTH - NSBHCONSULTMEDS_PSY_A_CORE FT
-c/w zyprexa 5mg PO Q HS  -c/w citalpram 10mg PO Q day Increase Zyprexa to 10mg PO qhS    D/c citalopram

## 2019-12-17 NOTE — PROGRESS NOTE BEHAVIORAL HEALTH - NSBHFUPINTERVALHXFT_PSY_A_CORE
Pt AAOx3. Calm and cooperative during interview. States her asthma is doing better and overall she feels "good." Reports she is worried about her daughter as she they had a fallen out bc daughter was wearing a heavy perfume, which patient was worried would exacerbate her asthma. Pt reports anxiety and panic attacks related to her ongoing asthma and concerns she may not be able to reach her asthma nebulizer on time. Also notes that she has a fallen out with HHA, but notes this was in the setting of said crossing her boundaries. Reports she is eating and sleeping okay. Pt denies any SI/HI or A/V hallucinations.     Per collateral from , Mr. Rosy To, at bedside, pt with ongoing delusions that family is wearing perfume or spraying substances across the wall to exacerbate her asthma. Pt also accused  of having a party without her. In additions to her ongoing delusions, family also concern about dementia symptoms and pt's inability to take care of herself and medication mismanagement. Per , pt manges her own meds and she has been off psych meds since her psychiatrist retired 5-6months ago.     Per 's report at bedside during interview, pt is very paranoid at home and believes him/their son is trying to poison her food and are giving her asthma.  Pt verbally aggressive towards son and daughter. Per , pt does not allow him to help he with medication management and she has been hostile towards 2 HHA , accusing them of trying to hurt her. Pt AAOx2, cannot give year. Calm and cooperative during interview. States her asthma is doing better and overall she feels "good." Reports she is worried about her daughter as she they had a fallen out bc daughter was wearing a heavy perfume, which patient was worried would exacerbate her asthma. Pt reports anxiety and panic attacks related to her ongoing asthma and concerns she may not be able to reach her asthma nebulizer on time. Also notes that she has a fallen out with HHA, but notes this was in the setting of said crossing her boundaries. Reports she is eating and sleeping okay. Pt denies any SI/HI or A/V hallucinations.     Per collateral from , Mr. Rosy To, at bedside, pt with ongoing delusions that family is wearing perfume or spraying substances across the wall to exacerbate her asthma. Pt also accused  of having a party without her. In additions to her ongoing delusions, family also concern about dementia symptoms and pt's inability to take care of herself and medication mismanagement.  Has been agitated and verbally abusive with caregivers, family members.  Per , pt manges her own meds and she has been off psych meds since her psychiatrist retired 5-6months ago.     Per 's report at bedside during interview, pt is very paranoid at home and believes him/their son is trying to poison her food and are giving her asthma.  Pt verbally aggressive towards son and daughter. Per , pt does not allow him to help he with medication management and she has been hostile towards 2 HHA , accusing them of trying to hurt her. Pt AAOx2, cannot give year. Calm and cooperative during interview. States her asthma is doing better and overall she feels "good." Reports she is worried about her daughter as she they had a fallen out bc daughter was wearing a heavy perfume, which patient was worried would exacerbate her asthma. Pt reports anxiety and panic attacks related to her ongoing asthma and concerns she may not be able to reach her asthma nebulizer on time. Also notes that she has a fallen out with HHA, but notes this was in the setting of said HHA crossing her boundaries. Reports she is eating and sleeping okay. Pt denies any SI/HI or A/V hallucinations.     Per collateral from , Mr. Rosy To, at bedside, pt with ongoing delusions that family is wearing perfume or spraying substances across the wall to exacerbate her asthma. Pt also accused  of having a party without her while she is in the hospital. In additions to her ongoing delusions, family also concern about dementia symptoms and pt's inability to take care of herself and medication mismanagement.  Has been agitated and verbally abusive with caregivers, family members.  Per , pt manges her own meds and she has been off psych meds since her psychiatrist retired 5-6months ago.

## 2019-12-17 NOTE — DISCHARGE NOTE NURSING/CASE MANAGEMENT/SOCIAL WORK - NSDCPEWEB_GEN_ALL_CORE
[facility] for Tobacco Control website --- [email or web address] website --- [email or web address]

## 2019-12-17 NOTE — PROGRESS NOTE BEHAVIORAL HEALTH - CASE SUMMARY
69 F with PPHX of delusional disorder and anxiety disorder with multiple psychiatric admissions and PMH  Anxiety, minimally ambulatory with use of walker,  HTN, HLD , NOEMY, asthma, COPD, DVT no longer on a/c , remote Breast Cancer, Kidney cancer  in remission , Graves disease, Hypothyroid , S/P laminectomy , carpal tunnel syndrome, Obesity, multiple admissions for sob in past attributed to anxiety/panic attacks, now p/w b/l LE edema and pain.   She complains of dyspnea and is admitted medically for cellulitis of the leg and UTI.  Psychiatry is consulted due to history of anxiety and paranoia.  Pt remains paranoid towards , labile, verbally agitated but overall redirectable, no SI/HI.   states pt has been paranoid for several years, worse since her psychiatrist retired 6 mos ago, not taking meds.  12/17: Pt AOx2, could not give year, excitable and with productive speech, labile at times and tearful.  Paranoid with family members per collateral, firing her aides 2/2 paranoia, not taking medications correctly or noncompliant altogether,  with safety concerns, feels pt might require psych admission.  Agree with resident's assessment and plan as above, increase Zyprexa, d/c Celexa.

## 2019-12-17 NOTE — PROGRESS NOTE BEHAVIORAL HEALTH - SUMMARY
69 F with PPHX of delusional disorder and anxiety disorder with multiple psychiatric admissions and PMH  Anxiety, minimally ambulatory with use of walker,  HTN, HLD , NOEMY, asthma, COPD, DVT no longer on a/c , remote Breast Cancer, Kidney cancer  in remission , Graves disease, Hypothyroid , S/P laminectomy , carpal tunnel syndrome, Obesity, multiple admissions for sob in past attributed to anxiety/panic attacks, now p/w b/l LE edema and pain.   She complains of dyspnea and is admitted medically for cellulitis of the leg and UTI.  Psychiatry is consulted due to history of anxiety and current anxiety and tearfulness in ED as a result of her medical admission.    Originally presented with disheveled and unkempt with high anxiety, paranoia and delusions of persecution from her , suspected olfactory hallucinations related to perfumes and fear of death from medical illness. Today, pt was more cooperative during interview, but continues to have persecutory delusions and labile mood. However, with improved sleep and less agitated then yesterday.     Recommendations:  -increase zyprexa to 10mg PO Q HS  -d/c citalpram 10mg PO Q day  - c/w ativan 0.5mg PO Q 6 hours PRN anxiety

## 2019-12-17 NOTE — PROGRESS NOTE ADULT - SUBJECTIVE AND OBJECTIVE BOX
Cardiovascular Disease Progress Note    Overnight events: No acute events overnight.  this morning appears anxious and disoriented. is worried about her nebulizers and states she thinks shes at home. denies any new cardiac sx  Otherwise review of systems negative    Objective Findings:  T(C): 37.7 (12-17-19 @ 04:54), Max: 37.7 (12-17-19 @ 04:54)  HR: 78 (12-17-19 @ 04:54) (69 - 111)  BP: 159/89 (12-17-19 @ 04:54) (140/75 - 162/71)  RR: 17 (12-17-19 @ 04:54) (17 - 18)  SpO2: 100% (12-17-19 @ 04:54) (97% - 100%)  Wt(kg): --  Daily     Daily       Physical Exam:  Gen: NAD  HEENT: EOMI  CV: RRR, normal S1 + S2, no m/r/g  Lungs: CTAB  Abd: soft, non-tender  Ext: No edema      Laboratory Data:                        10.8   13.49 )-----------( 131      ( 16 Dec 2019 08:15 )             36.6     12-16    144  |  106  |  18  ----------------------------<  98  3.5   |  29  |  0.63    Ca    8.7      16 Dec 2019 06:16  Phos  2.2     12-15  Mg     2.2     12-15    TPro  5.4<L>  /  Alb  3.2<L>  /  TBili  0.7  /  DBili  x   /  AST  29  /  ALT  47<H>  /  AlkPhos  58  12-15              Inpatient Medications:  MEDICATIONS  (STANDING):  ALBUTerol    90 MICROgram(s) HFA Inhaler 1 Puff(s) Inhalation every 4 hours  amLODIPine   Tablet 2.5 milliGRAM(s) Oral daily  atorvastatin 40 milliGRAM(s) Oral at bedtime  baclofen 5 milliGRAM(s) Oral three times a day  cefepime   IVPB 1000 milliGRAM(s) IV Intermittent every 12 hours  cholecalciferol 2000 Unit(s) Oral daily  citalopram 10 milliGRAM(s) Oral daily  clopidogrel Tablet 75 milliGRAM(s) Oral daily  cyanocobalamin 1000 MICROGram(s) Oral daily  enoxaparin Injectable 40 milliGRAM(s) SubCutaneous daily  fluticasone propionate 50 MICROgram(s)/spray Nasal Spray 1 Spray(s) Both Nostrils two times a day  folic acid 1 milliGRAM(s) Oral daily  gabapentin 100 milliGRAM(s) Oral three times a day  levothyroxine 125 MICROGram(s) Oral daily  lidocaine   Patch 1 Patch Transdermal daily  losartan 100 milliGRAM(s) Oral daily  melatonin 3 milliGRAM(s) Oral at bedtime  montelukast 10 milliGRAM(s) Oral daily  OLANZapine 5 milliGRAM(s) Oral at bedtime  pantoprazole    Tablet 40 milliGRAM(s) Oral before breakfast  tiotropium 18 MICROgram(s) Capsule 1 Capsule(s) Inhalation daily  trihexyphenidyl 2 milliGRAM(s) Oral two times a day      Assessment:  -sob  -le cellulitis  -atypical cp s/p recent LHC with normal cors  -venous insufficiency  -copd/asthma  -obesity  -melba  -severe anxiety  -hypothyroidism    Recs:  -c/w home antihypertensives and diuretics  -c/w bronchodilators  -consult psych. would she benefit from inpatient psych?. i personally do not feel she should be discharged home until she is on a stable psych regimen due to her multiple hospitalizations and inability to care for herself  -keep legs elevated, compression stockings and c/w diuretic. unclear why now on plavix. would attempt to clarify  f/u ID. ? if leukocytosis 2/2 cellulitis vs iatrogenic prednisone intake  -DVT ppx      Over 25 minutes spent on total encounter; more than 50% of the visit was spent counseling and/or coordinating care by the attending physician.      Juan Salcedo MD   Cardiovascular Disease  [phone]

## 2019-12-18 LAB
ANION GAP SERPL CALC-SCNC: 12 MMOL/L — SIGNIFICANT CHANGE UP (ref 5–17)
BUN SERPL-MCNC: 10 MG/DL — SIGNIFICANT CHANGE UP (ref 7–23)
CALCIUM SERPL-MCNC: 8.8 MG/DL — SIGNIFICANT CHANGE UP (ref 8.4–10.5)
CHLORIDE SERPL-SCNC: 103 MMOL/L — SIGNIFICANT CHANGE UP (ref 96–108)
CO2 SERPL-SCNC: 26 MMOL/L — SIGNIFICANT CHANGE UP (ref 22–31)
CREAT SERPL-MCNC: 0.47 MG/DL — LOW (ref 0.5–1.3)
GLUCOSE SERPL-MCNC: 114 MG/DL — HIGH (ref 70–99)
GRAM STN FLD: SIGNIFICANT CHANGE UP
POTASSIUM SERPL-MCNC: 3.6 MMOL/L — SIGNIFICANT CHANGE UP (ref 3.5–5.3)
POTASSIUM SERPL-SCNC: 3.6 MMOL/L — SIGNIFICANT CHANGE UP (ref 3.5–5.3)
SODIUM SERPL-SCNC: 141 MMOL/L — SIGNIFICANT CHANGE UP (ref 135–145)
SPECIMEN SOURCE: SIGNIFICANT CHANGE UP

## 2019-12-18 PROCEDURE — 99232 SBSQ HOSP IP/OBS MODERATE 35: CPT | Mod: GC

## 2019-12-18 RX ORDER — MEROPENEM 1 G/30ML
1000 INJECTION INTRAVENOUS EVERY 8 HOURS
Refills: 0 | Status: DISCONTINUED | OUTPATIENT
Start: 2019-12-18 | End: 2019-12-23

## 2019-12-18 RX ORDER — OLANZAPINE 15 MG/1
10 TABLET, FILM COATED ORAL AT BEDTIME
Refills: 0 | Status: DISCONTINUED | OUTPATIENT
Start: 2019-12-18 | End: 2019-12-23

## 2019-12-18 RX ADMIN — Medication 2 MILLIGRAM(S): at 06:10

## 2019-12-18 RX ADMIN — OXYCODONE HYDROCHLORIDE 5 MILLIGRAM(S): 5 TABLET ORAL at 23:39

## 2019-12-18 RX ADMIN — LIDOCAINE 1 PATCH: 4 CREAM TOPICAL at 23:28

## 2019-12-18 RX ADMIN — ENOXAPARIN SODIUM 40 MILLIGRAM(S): 100 INJECTION SUBCUTANEOUS at 11:05

## 2019-12-18 RX ADMIN — Medication 1 MILLIGRAM(S): at 11:05

## 2019-12-18 RX ADMIN — Medication 0.5 MILLIGRAM(S): at 09:03

## 2019-12-18 RX ADMIN — Medication 0.5 MILLIGRAM(S): at 15:45

## 2019-12-18 RX ADMIN — Medication 5 MILLIGRAM(S): at 06:10

## 2019-12-18 RX ADMIN — OXYCODONE HYDROCHLORIDE 5 MILLIGRAM(S): 5 TABLET ORAL at 15:45

## 2019-12-18 RX ADMIN — Medication 3 MILLIGRAM(S): at 21:42

## 2019-12-18 RX ADMIN — Medication 5 MILLIGRAM(S): at 13:21

## 2019-12-18 RX ADMIN — GABAPENTIN 100 MILLIGRAM(S): 400 CAPSULE ORAL at 06:10

## 2019-12-18 RX ADMIN — LOSARTAN POTASSIUM 100 MILLIGRAM(S): 100 TABLET, FILM COATED ORAL at 06:10

## 2019-12-18 RX ADMIN — CLOPIDOGREL BISULFATE 75 MILLIGRAM(S): 75 TABLET, FILM COATED ORAL at 11:05

## 2019-12-18 RX ADMIN — OXYCODONE HYDROCHLORIDE 5 MILLIGRAM(S): 5 TABLET ORAL at 16:15

## 2019-12-18 RX ADMIN — GABAPENTIN 100 MILLIGRAM(S): 400 CAPSULE ORAL at 13:01

## 2019-12-18 RX ADMIN — PANTOPRAZOLE SODIUM 40 MILLIGRAM(S): 20 TABLET, DELAYED RELEASE ORAL at 06:10

## 2019-12-18 RX ADMIN — MEROPENEM 100 MILLIGRAM(S): 1 INJECTION INTRAVENOUS at 21:40

## 2019-12-18 RX ADMIN — OLANZAPINE 10 MILLIGRAM(S): 15 TABLET, FILM COATED ORAL at 21:45

## 2019-12-18 RX ADMIN — CITALOPRAM 10 MILLIGRAM(S): 10 TABLET, FILM COATED ORAL at 11:05

## 2019-12-18 RX ADMIN — GABAPENTIN 100 MILLIGRAM(S): 400 CAPSULE ORAL at 21:42

## 2019-12-18 RX ADMIN — Medication 2000 UNIT(S): at 11:05

## 2019-12-18 RX ADMIN — Medication 125 MICROGRAM(S): at 06:10

## 2019-12-18 RX ADMIN — Medication 2 MILLIGRAM(S): at 17:01

## 2019-12-18 RX ADMIN — Medication 1 SPRAY(S): at 06:09

## 2019-12-18 RX ADMIN — LIDOCAINE 1 PATCH: 4 CREAM TOPICAL at 11:05

## 2019-12-18 RX ADMIN — Medication 5 MILLIGRAM(S): at 21:42

## 2019-12-18 RX ADMIN — AMLODIPINE BESYLATE 2.5 MILLIGRAM(S): 2.5 TABLET ORAL at 06:10

## 2019-12-18 RX ADMIN — MONTELUKAST 10 MILLIGRAM(S): 4 TABLET, CHEWABLE ORAL at 11:05

## 2019-12-18 RX ADMIN — PREGABALIN 1000 MICROGRAM(S): 225 CAPSULE ORAL at 11:05

## 2019-12-18 RX ADMIN — OXYCODONE HYDROCHLORIDE 5 MILLIGRAM(S): 5 TABLET ORAL at 07:00

## 2019-12-18 RX ADMIN — LIDOCAINE 1 PATCH: 4 CREAM TOPICAL at 17:57

## 2019-12-18 RX ADMIN — Medication 0.5 MILLIGRAM(S): at 21:41

## 2019-12-18 RX ADMIN — MEROPENEM 100 MILLIGRAM(S): 1 INJECTION INTRAVENOUS at 06:10

## 2019-12-18 RX ADMIN — OXYCODONE HYDROCHLORIDE 5 MILLIGRAM(S): 5 TABLET ORAL at 06:22

## 2019-12-18 RX ADMIN — ATORVASTATIN CALCIUM 40 MILLIGRAM(S): 80 TABLET, FILM COATED ORAL at 21:42

## 2019-12-18 RX ADMIN — MEROPENEM 100 MILLIGRAM(S): 1 INJECTION INTRAVENOUS at 13:21

## 2019-12-18 NOTE — PROGRESS NOTE ADULT - ASSESSMENT
_________________________________________________________________________________________  ========>>  M E D I C A L   A T T E N D I N G    F O L L O W  U P  N O T E  <<=========  -----------------------------------------------------------------------------------------------------    - Patient seen and examined by me earlier today.   - In summary,  SHEKHAR VASQUEZ is a 69y year old woman who originally presented with leg infection   - Patient today overall doing ok, comfortable, eating OK.       pt yesterday had fever, sent blood cultures , abx upgraded    ==================>> REVIEW OF SYSTEM <<=================    GEN: no fever, no chills, no pain  RESP: no SOB now, no cough, no sputum  CVS: no chest pain now, no palpitations, + edema, unchanged  ( pt states at home does do ACE wrapping at times)   GI: no abdominal pain, no nausea, no constipation, no diarrhea  : no dysuria, no frequency, no hematuria  Neuro: no headache, no dizziness  Derm : no itching, no rash    ==================>> PHYSICAL EXAM <<=================    GEN: A&O X 3 , NAD , comfortable  HEENT: NCAT, PERRL, MMM, hearing intact  Neck: supple , no JVD  CVS: S1S2 , regular , No M/R/G appreciated  PULM: CTA B/L,  no W/R/R appreciated  ABD.: soft. non tender, non distended,  bowel sounds present  Extrem: intact pulses , + Bilateral LE edema stable        wound on left leg dressed   PSYCH : a bit anxious       ==================>> MEDICATIONS <<====================    ALBUTerol    90 MICROgram(s) HFA Inhaler 1 Puff(s) Inhalation every 4 hours  amLODIPine   Tablet 2.5 milliGRAM(s) Oral daily  atorvastatin 40 milliGRAM(s) Oral at bedtime  baclofen 5 milliGRAM(s) Oral three times a day  cholecalciferol 2000 Unit(s) Oral daily  clopidogrel Tablet 75 milliGRAM(s) Oral daily  cyanocobalamin 1000 MICROGram(s) Oral daily  enoxaparin Injectable 40 milliGRAM(s) SubCutaneous daily  fluticasone propionate 50 MICROgram(s)/spray Nasal Spray 1 Spray(s) Both Nostrils two times a day  folic acid 1 milliGRAM(s) Oral daily  gabapentin 100 milliGRAM(s) Oral three times a day  levothyroxine 125 MICROGram(s) Oral daily  lidocaine   Patch 1 Patch Transdermal daily  losartan 100 milliGRAM(s) Oral daily  melatonin 3 milliGRAM(s) Oral at bedtime  meropenem  IVPB 500 milliGRAM(s) IV Intermittent every 8 hours  montelukast 10 milliGRAM(s) Oral daily  OLANZapine 10 milliGRAM(s) Oral at bedtime  pantoprazole    Tablet 40 milliGRAM(s) Oral before breakfast  tiotropium 18 MICROgram(s) Capsule 1 Capsule(s) Inhalation daily  trihexyphenidyl 2 milliGRAM(s) Oral two times a day    MEDICATIONS  (PRN):  acetaminophen   Tablet .. 650 milliGRAM(s) Oral every 6 hours PRN Temp greater or equal to 38C (100.4F)  albuterol/ipratropium for Nebulization 3 milliLiter(s) Nebulizer every 6 hours PRN Shortness of Breath and/or Wheezing  LORazepam     Tablet 0.5 milliGRAM(s) Oral every 4 hours PRN anxiety or agitation  oxyCODONE    IR 5 milliGRAM(s) Oral every 6 hours PRN moderate or severe pain    ==================>> VITAL SIGNS <<==================    Vital Signs Last 24 Hrs  T(C): 37.1 (12-18-19 @ 04:09)  T(F): 98.7 (12-18-19 @ 04:09), Max: 102.3 (12-17-19 @ 15:00)  HR: 60 (12-18-19 @ 04:09) (60 - 106)  BP: 149/73 (12-18-19 @ 04:09)  RR: 17 (12-18-19 @ 04:09) (17 - 18)  SpO2: 95% (12-18-19 @ 04:09) (94% - 96%)       ==================>> LAB AND IMAGING <<==================                        11.3   15.46 )-----------( 124      ( 17 Dec 2019 09:11 )             37.1        141  |  103  |  10  ----------------------------<  114<H>  3.6   |  26  |  0.47<L>    Ca    8.8      18 Dec 2019 05:45    TPro  5.4<L>  /  Alb  2.9<L>  /  TBili  0.9  /  DBili  x   /  AST  9<L>  /  ALT  27  /  AlkPhos  59  12-17    WBC count:   15.46 <<== ,  13.49 <<== ,  24.76 <<== ,  21.32 <<== ,  18.27 <<==   Hemoglobin:   11.3 <<==,  10.8 <<==,  10.7 <<==,  11.0 <<==,  11.0 <<==  platelets:  124 <==, 131 <==, 159 <==, 180 <==, 203 <==    Creatinine:  0.47  <<==, 0.66  <<==, 0.63  <<==, 0.52  <<==, 0.51  <<==, 0.67  <<==  Sodium:   141  <==, 141  <==, 144  <==, 143  <==, 145  <==, 142  <==       AST:          9 <== , 29 <== , 35 <== , 10 <==      ALT:        27  <== , 47  <== , 49  <== , 23  <==      AP:        59  <=, 58  <=, 62  <=, 67  <=     Bili:        0.9  <=, 0.7  <=, 0.6  <=, 0.5  <=    _______________________  C U L T U R E S :    Culture - Urine (collected 15 Dec 2019 03:30)  Source: .Urine Catheterized  Final Report (17 Dec 2019 08:39):    50,000 - 99,000 CFU/mL Escherichia coli ESBL    <10,000 CFU/ml Normal Urogenital love present  Organism: Escherichia coli ESBL (17 Dec 2019 08:39)  Organism: Escherichia coli ESBL (17 Dec 2019 08:39)    Sensitivities:      -  Amikacin: S <=16      -  Ampicillin: R >16 These ampicillin results predict results for amoxicillin      -  Ampicillin/Sulbactam: R 16/8 Enterobacter, Citrobacter, and Serratia may develop resistance during prolonged therapy (3-4 days)      -  Aztreonam: R 16      -  Cefazolin: R >16 (MIC_CL_COM_ENTERIC_CEFAZU) For uncomplicated UTI with K. pneumoniae, E. coli, or P. mirablis: MARIA M <=16 is sensitive and MARIA M >=32 is resistant. This also predicts results for oral agents cefaclor, cefdinir, cefpodoxime, cefprozil, cefuroxime axetil, cephalexin and locarbef for uncomplicated UTI. Note that some isolates may be susceptible to these agents while testing resistant to cefazolin.      -  Cefepime: R >16      -  Cefoxitin: S <=8      -  Ceftriaxone: R >32 Enterobacter, Citrobacter, and Serratia may develop resistance during prolonged therapy      -  Ciprofloxacin: R >2      -  Ertapenem: S <=1      -  Gentamicin: S <=4      -  Imipenem: S <=1      -  Levofloxacin: R >4      -  Meropenem: S <=1      -  Nitrofurantoin: S <=32 Should not be used to treat pyelonephritis      -  Piperacillin/Tazobactam: R <=16      -  Tigecycline: S <=2      -  Tobramycin: S <=4      -  Trimethoprim/Sulfamethoxazole: R >2/38      Method Type: MARIA M    Culture - Blood (collected 15 Dec 2019 03:28)  Source: .Blood Blood-Peripheral  Preliminary Report (16 Dec 2019 04:01):    No growth to date.    Culture - Blood (collected 15 Dec 2019 03:28)  Source: .Blood Blood  Preliminary Report (16 Dec 2019 04:01):    No growth to date.    ___________________________________________________________________________________  ===============>>  A S S E S S M E N T   A N D   P L A N <<===============  ------------------------------------------------------------------------------------------    · Assessment		  69 F PMH  Anxiety, HTN, HLD , NOEMY, asthma, COPD, DVT no longer on a/c , remote Breast Cancer, Kidney cancer  in remission , Graves disease, Hypothyroid , S/P laminectomy , carpal tunnel syndrome, Obesity, multiple admissions for sob in past attributed to anxiety/panic attacks, now p/w b/l LE edema and pain.      Problem/Plan - 1:  ·  Problem: Cellulitis of lower extremity / infected left leg wound   --ID eval and mgmt appreciated   -continue IV abx   no DVT on duplex  seen   local wound care / wound care team   treat edema with diuretics   leg elevation     Problem/Plan - 2:  ·  Problem:  Bacteriuria  now post fever  -ID appreciated   -cultures as above   - abx upgraded given fever    Problem/Plan - 3:  ·  Problem: Mood disorder.  Plan: -Pt with complex anxiety spectrum disorder, paranoid delusion disorder with mood issues as noted on prior extensive behavioral health eval from admission 11/2019.  - psych following  - Continue Current medications and monitor.   - pt has had prior inpatient psych admissions: psych to eval further treatment need and possible inpatient transfer   - social work eval and follow up ( pt has previously made unproven statements about her  )     Problem/Plan - 4:  ·  Problem: Other chronic pain.  Plan: -c/w baclofen  - Continue Current medications and monitor.     Problem/Plan - 5:  ·  Problem: Debility  - PT / OOB as able   -fall, asp, sz prec    Problem/Plan - 6:  Problem: Essential hypertension.  -c/w home losartan, norvasc.  - cardio following     Problem/Plan - 7:  ·  Problem: Prophylactic measure.  Plan: -lovenox vte ppx.     --------------------------------------------  Case discussed with pt  Education given on findings and plan of care  ___________________________  H. JANICE Welch.  Pager: [phone]

## 2019-12-18 NOTE — PROGRESS NOTE BEHAVIORAL HEALTH - SUMMARY
69 F with PPHX of delusional disorder and anxiety disorder with multiple psychiatric admissions and PMH  Anxiety, minimally ambulatory with use of walker,  HTN, HLD , NOEMY, asthma, COPD, DVT no longer on a/c , remote Breast Cancer, Kidney cancer  in remission , Graves disease, Hypothyroid , S/P laminectomy , carpal tunnel syndrome, Obesity, multiple admissions for sob in past attributed to anxiety/panic attacks, now p/w b/l LE edema and pain.   She complains of dyspnea and is admitted medically for cellulitis of the leg and UTI.  Psychiatry is consulted due to history of anxiety and current anxiety and tearfulness in ED as a result of her medical admission.    Originally presented with disheveled and unkempt with high anxiety, paranoia and delusions of persecution from her , suspected olfactory hallucinations related to perfumes and fear of death from medical illness. Today, pt continues to be cooperative during interview but exam noticeable for mildly elevated mood. Pt continues to have persecutory delusions and labile mood.     Recommendations:  -increase zyprexa to 10mg PO Q HS  -d/c citalpram 10mg PO Q day  - c/w ativan 0.5mg PO Q 6 hours PRN anxiety 69 F with PPHX of delusional disorder and anxiety disorder with multiple psychiatric admissions and PMH  Anxiety, minimally ambulatory with use of walker,  HTN, HLD , NOEMY, asthma, COPD, DVT no longer on a/c , remote Breast Cancer, Kidney cancer  in remission , Graves disease, Hypothyroid , S/P laminectomy , carpal tunnel syndrome, Obesity, multiple admissions for sob in past attributed to anxiety/panic attacks, now p/w b/l LE edema and pain.   She complains of dyspnea and is admitted medically for cellulitis of the leg and UTI.  Psychiatry is consulted due to history of anxiety and current anxiety and tearfulness in ED as a result of her medical admission.    Originally presented with disheveled and unkempt with high anxiety, paranoia and delusions of persecution from her , suspected olfactory hallucinations related to perfumes and fear of death from medical illness. Today, pt continues to be cooperative during interview but exam noticeable for mildly elevated mood. Pt continues to have persecutory delusions and labile mood.     Recommendations:  -increase zyprexa to 10mg PO Q HS  -d/c citalpram   - c/w ativan 0.5mg PO Q 6 hours PRN anxiety

## 2019-12-18 NOTE — PHARMACOTHERAPY INTERVENTION NOTE - COMMENTS
Patient is a 68 yo obese F with SCr 0.47, CrCl is 139 ml/min, currently on meropenem 500mg IV q8h for complicated UTI treatment. Recommend dosing meropenem to 1g IV q8h. Discussed with Dr. King.    Nabila Albarran, PharmD  PGY1 Pharmacy Practice Resident  [phone]

## 2019-12-18 NOTE — PROGRESS NOTE BEHAVIORAL HEALTH - CASE SUMMARY
69 F with PPHX of delusional disorder and anxiety disorder with multiple psychiatric admissions and PMH  Anxiety, minimally ambulatory with use of walker,  HTN, HLD , NOEMY, asthma, COPD, DVT no longer on a/c , remote Breast Cancer, Kidney cancer  in remission , Graves disease, Hypothyroid , S/P laminectomy , carpal tunnel syndrome, Obesity, multiple admissions for sob in past attributed to anxiety/panic attacks, now p/w b/l LE edema and pain.   She complains of dyspnea and is admitted medically for cellulitis of the leg and UTI.  Psychiatry is consulted due to history of anxiety and paranoia.  Pt remains paranoid towards , labile, verbally agitated but overall redirectable, no SI/HI.   states pt has been paranoid for several years, worse since her psychiatrist retired 6 mos ago, not taking meds.  12/17: Pt AOx2, could not give year, excitable and with productive speech, labile at times and tearful.  Paranoid with family members per collateral, firing her aides 2/2 paranoia, not taking medications correctly or noncompliant altogether,  with safety concerns, feels pt might require psych admission.  12/18: pt elevated, expansive, appears hypomanic, speaking rapidly (but interruptibly).  Remains paranoid towards family.  Nursing notes pt is anxious.  Agree with resident's assessment and plan as above, increase Zyprexa, d/c Celexa.

## 2019-12-18 NOTE — PROGRESS NOTE ADULT - SUBJECTIVE AND OBJECTIVE BOX
CC: f/u for gram pos abhijeet bacteremia, esbl coliform uti, cellulitis left leg    Patient reports her  is angry with her because she made on the floor and she needs her nebulizer    REVIEW OF SYSTEMS:  All other review of systems negative (Comprehensive ROS)    Antimicrobials Day #  :2  meropenem  IVPB 1000 milliGRAM(s) IV Intermittent every 8 hours    Other Medications Reviewed    T(F): 99.5 (19 @ 14:47), Max: 99.5 (19 @ 14:47)  HR: 88 (19 @ 14:47)  BP: 148/55 (19 @ 14:47)  RR: 18 (19 @ 14:47)  SpO2: 97% (19 @ 14:47)  Wt(kg): --    PHYSICAL EXAM:  General: alert, no acute distress  Eyes:  anicteric, no conjunctival injection, no discharge  Oropharynx: no lesions or injection 	  Neck: supple, without adenopathy  Lungs: clear to auscultation  Heart: regular rate and rhythm; no murmur, rubs or gallops  Abdomen: soft, nondistended, nontender, without mass or organomegaly  Skin: no lesions  Extremities: no clubbing, cyanosis. bilateral pedal  edema  Neurologic: alert,  moves all extremities  back ecchymosis with hematoma  LAB RESULTS:                        11.3   15.46 )-----------( 124      ( 17 Dec 2019 09:11 )             37.1     12-18    141  |  103  |  10  ----------------------------<  114<H>  3.6   |  26  |  0.47<L>    Ca    8.8      18 Dec 2019 05:45    TPro  5.4<L>  /  Alb  2.9<L>  /  TBili  0.9  /  DBili  x   /  AST  9<L>  /  ALT  27  /  AlkPhos  59      LIVER FUNCTIONS - ( 17 Dec 2019 06:44 )  Alb: 2.9 g/dL / Pro: 5.4 g/dL / ALK PHOS: 59 U/L / ALT: 27 U/L / AST: 9 U/L / GGT: x           Urinalysis Basic - ( 17 Dec 2019 19:40 )    Color: Light Yellow / Appearance: Clear / S.011 / pH: x  Gluc: x / Ketone: Negative  / Bili: Negative / Urobili: <2 mg/dL   Blood: x / Protein: Trace / Nitrite: Negative   Leuk Esterase: Small / RBC: 3 /HPF / WBC 2 /HPF   Sq Epi: x / Non Sq Epi: 0 /HPF / Bacteria: Negative      MICROBIOLOGY:  RECENT CULTURES:   @ 21:25 .Blood Blood     Growth in aerobic bottle: Gram Positive Rods    Growth in aerobic bottle: Gram Positive Rods    12-15 @ 03:30 .Urine Catheterized Escherichia coli ESBL    50,000 - 99,000 CFU/mL Escherichia coli ESBL  <10,000 CFU/ml Normal Urogenital love present      12-15 @ 03:28 .Blood Blood     No growth to date.          RADIOLOGY REVIEWED:    < from: [facility] Duplex Lower Ext Vein Scan, Kristin (19 @ 09:38) >  IMPRESSION:     No evidence of deep venous thrombosis in either lower extremity above the   knee.        < end of copied text >    Assessment:  Patient with anxiety d/o, asthma admitted because she was taking too much of her pain and anxiety meds, found to have possible cellulitis of left leg complicating venous stasis ulcer. Yesterday spiked isolated fever of 102, higher wbc, urine with esbl coliform so meropenem commenced. Repeat cultures with 1/2 gpr of unclear significance.   Plan:  continue meropenem   await ct chest abd and pelvis

## 2019-12-18 NOTE — PROGRESS NOTE ADULT - SUBJECTIVE AND OBJECTIVE BOX
Cardiovascular Disease Progress Note    Overnight events: No acute events overnight.  less anxious this morning. using her nebulizer. no new cardiac sx  Otherwise review of systems negative    Objective Findings:  T(C): 37.1 (12-18-19 @ 04:09), Max: 39.1 (12-17-19 @ 15:00)  HR: 60 (12-18-19 @ 04:09) (60 - 106)  BP: 149/73 (12-18-19 @ 04:09) (132/69 - 149/73)  RR: 17 (12-18-19 @ 04:09) (17 - 18)  SpO2: 95% (12-18-19 @ 04:09) (94% - 96%)  Wt(kg): --  Daily Height in cm: 152.4 (17 Dec 2019 15:55)    Daily       Physical Exam:  Gen: NAD  HEENT: EOMI  CV: RRR, normal S1 + S2, no m/r/g  Lungs: CTAB  Abd: soft, non-tender  Ext: No edema      Laboratory Data:                        11.3   15.46 )-----------( 124      ( 17 Dec 2019 09:11 )             37.1     12-18    141  |  103  |  10  ----------------------------<  114<H>  3.6   |  26  |  0.47<L>    Ca    8.8      18 Dec 2019 05:45    TPro  5.4<L>  /  Alb  2.9<L>  /  TBili  0.9  /  DBili  x   /  AST  9<L>  /  ALT  27  /  AlkPhos  59  12-17              Inpatient Medications:  MEDICATIONS  (STANDING):  ALBUTerol    90 MICROgram(s) HFA Inhaler 1 Puff(s) Inhalation every 4 hours  amLODIPine   Tablet 2.5 milliGRAM(s) Oral daily  atorvastatin 40 milliGRAM(s) Oral at bedtime  baclofen 5 milliGRAM(s) Oral three times a day  cholecalciferol 2000 Unit(s) Oral daily  citalopram 10 milliGRAM(s) Oral daily  clopidogrel Tablet 75 milliGRAM(s) Oral daily  cyanocobalamin 1000 MICROGram(s) Oral daily  enoxaparin Injectable 40 milliGRAM(s) SubCutaneous daily  fluticasone propionate 50 MICROgram(s)/spray Nasal Spray 1 Spray(s) Both Nostrils two times a day  folic acid 1 milliGRAM(s) Oral daily  gabapentin 100 milliGRAM(s) Oral three times a day  levothyroxine 125 MICROGram(s) Oral daily  lidocaine   Patch 1 Patch Transdermal daily  losartan 100 milliGRAM(s) Oral daily  melatonin 3 milliGRAM(s) Oral at bedtime  meropenem  IVPB 500 milliGRAM(s) IV Intermittent every 8 hours  montelukast 10 milliGRAM(s) Oral daily  OLANZapine 5 milliGRAM(s) Oral at bedtime  pantoprazole    Tablet 40 milliGRAM(s) Oral before breakfast  tiotropium 18 MICROgram(s) Capsule 1 Capsule(s) Inhalation daily  trihexyphenidyl 2 milliGRAM(s) Oral two times a day      Assessment:  -sob  -le cellulitis  -atypical cp s/p recent LHC with normal cors  -venous insufficiency  -copd/asthma  -obesity  -melba  -severe anxiety  -hypothyroidism    Recs:  -c/w home antihypertensives and diuretics  -c/w bronchodilators  -psychiatry recs appreciated. would she benefit from inpatient psych?. i personally do not feel she should be discharged home until she is on a stable psychiatric regimen due to her multiple hospitalizations and inability to care for herself  -keep legs elevated, compression stockings and c/w diuretic. unclear why now on plavix. would attempt to clarify  f/u ID. ? if leukocytosis 2/2 cellulitis vs iatrogenic prednisone intake  -DVT ppx      Over 25 minutes spent on total encounter; more than 50% of the visit was spent counseling and/or coordinating care by the attending physician.      Juan Salcedo MD   Cardiovascular Disease  [phone]
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
## 2019-12-18 NOTE — PROVIDER CONTACT NOTE (CRITICAL VALUE NOTIFICATION) - BACKGROUND
Patient admitted for cellulitis history of asthma, sleep apnea, obesity, kidney cancer, DVT, COPD, breast cancer

## 2019-12-19 LAB
ANION GAP SERPL CALC-SCNC: 11 MMOL/L — SIGNIFICANT CHANGE UP (ref 5–17)
BUN SERPL-MCNC: 8 MG/DL — SIGNIFICANT CHANGE UP (ref 7–23)
CALCIUM SERPL-MCNC: 9 MG/DL — SIGNIFICANT CHANGE UP (ref 8.4–10.5)
CHLORIDE SERPL-SCNC: 102 MMOL/L — SIGNIFICANT CHANGE UP (ref 96–108)
CO2 SERPL-SCNC: 30 MMOL/L — SIGNIFICANT CHANGE UP (ref 22–31)
CREAT SERPL-MCNC: 0.57 MG/DL — SIGNIFICANT CHANGE UP (ref 0.5–1.3)
CULTURE RESULTS: SIGNIFICANT CHANGE UP
DRUG SCREEN, SERUM: SIGNIFICANT CHANGE UP
GLUCOSE SERPL-MCNC: 100 MG/DL — HIGH (ref 70–99)
HCT VFR BLD CALC: 32.7 % — LOW (ref 34.5–45)
HGB BLD-MCNC: 9.9 G/DL — LOW (ref 11.5–15.5)
MCHC RBC-ENTMCNC: 27.8 PG — SIGNIFICANT CHANGE UP (ref 27–34)
MCHC RBC-ENTMCNC: 30.3 GM/DL — LOW (ref 32–36)
MCV RBC AUTO: 91.9 FL — SIGNIFICANT CHANGE UP (ref 80–100)
PLATELET # BLD AUTO: 95 K/UL — LOW (ref 150–400)
POTASSIUM SERPL-MCNC: 3.5 MMOL/L — SIGNIFICANT CHANGE UP (ref 3.5–5.3)
POTASSIUM SERPL-SCNC: 3.5 MMOL/L — SIGNIFICANT CHANGE UP (ref 3.5–5.3)
RBC # BLD: 3.56 M/UL — LOW (ref 3.8–5.2)
RBC # FLD: 16.5 % — HIGH (ref 10.3–14.5)
SODIUM SERPL-SCNC: 143 MMOL/L — SIGNIFICANT CHANGE UP (ref 135–145)
SPECIMEN SOURCE: SIGNIFICANT CHANGE UP
WBC # BLD: 10.49 K/UL — SIGNIFICANT CHANGE UP (ref 3.8–10.5)
WBC # FLD AUTO: 10.49 K/UL — SIGNIFICANT CHANGE UP (ref 3.8–10.5)

## 2019-12-19 PROCEDURE — 99232 SBSQ HOSP IP/OBS MODERATE 35: CPT | Mod: GC

## 2019-12-19 RX ORDER — DIVALPROEX SODIUM 500 MG/1
250 TABLET, DELAYED RELEASE ORAL
Refills: 0 | Status: DISCONTINUED | OUTPATIENT
Start: 2019-12-19 | End: 2019-12-26

## 2019-12-19 RX ADMIN — Medication 3 MILLILITER(S): at 13:56

## 2019-12-19 RX ADMIN — Medication 0.5 MILLIGRAM(S): at 10:59

## 2019-12-19 RX ADMIN — LIDOCAINE 1 PATCH: 4 CREAM TOPICAL at 22:36

## 2019-12-19 RX ADMIN — MEROPENEM 100 MILLIGRAM(S): 1 INJECTION INTRAVENOUS at 05:11

## 2019-12-19 RX ADMIN — Medication 3 MILLIGRAM(S): at 20:51

## 2019-12-19 RX ADMIN — Medication 5 MILLIGRAM(S): at 13:02

## 2019-12-19 RX ADMIN — OXYCODONE HYDROCHLORIDE 5 MILLIGRAM(S): 5 TABLET ORAL at 00:10

## 2019-12-19 RX ADMIN — LIDOCAINE 1 PATCH: 4 CREAM TOPICAL at 22:35

## 2019-12-19 RX ADMIN — OXYCODONE HYDROCHLORIDE 5 MILLIGRAM(S): 5 TABLET ORAL at 14:56

## 2019-12-19 RX ADMIN — Medication 0.5 MILLIGRAM(S): at 01:47

## 2019-12-19 RX ADMIN — MEROPENEM 100 MILLIGRAM(S): 1 INJECTION INTRAVENOUS at 13:03

## 2019-12-19 RX ADMIN — OXYCODONE HYDROCHLORIDE 5 MILLIGRAM(S): 5 TABLET ORAL at 13:56

## 2019-12-19 RX ADMIN — Medication 2000 UNIT(S): at 11:00

## 2019-12-19 RX ADMIN — Medication 0.5 MILLIGRAM(S): at 14:52

## 2019-12-19 RX ADMIN — AMLODIPINE BESYLATE 2.5 MILLIGRAM(S): 2.5 TABLET ORAL at 05:08

## 2019-12-19 RX ADMIN — Medication 0.5 MILLIGRAM(S): at 06:04

## 2019-12-19 RX ADMIN — Medication 1 SPRAY(S): at 05:07

## 2019-12-19 RX ADMIN — MONTELUKAST 10 MILLIGRAM(S): 4 TABLET, CHEWABLE ORAL at 11:02

## 2019-12-19 RX ADMIN — PREGABALIN 1000 MICROGRAM(S): 225 CAPSULE ORAL at 11:00

## 2019-12-19 RX ADMIN — OXYCODONE HYDROCHLORIDE 5 MILLIGRAM(S): 5 TABLET ORAL at 21:53

## 2019-12-19 RX ADMIN — Medication 0.5 MILLIGRAM(S): at 23:23

## 2019-12-19 RX ADMIN — GABAPENTIN 100 MILLIGRAM(S): 400 CAPSULE ORAL at 05:11

## 2019-12-19 RX ADMIN — LOSARTAN POTASSIUM 100 MILLIGRAM(S): 100 TABLET, FILM COATED ORAL at 05:08

## 2019-12-19 RX ADMIN — Medication 2 MILLIGRAM(S): at 05:09

## 2019-12-19 RX ADMIN — OXYCODONE HYDROCHLORIDE 5 MILLIGRAM(S): 5 TABLET ORAL at 20:50

## 2019-12-19 RX ADMIN — GABAPENTIN 100 MILLIGRAM(S): 400 CAPSULE ORAL at 13:02

## 2019-12-19 RX ADMIN — OLANZAPINE 10 MILLIGRAM(S): 15 TABLET, FILM COATED ORAL at 20:53

## 2019-12-19 RX ADMIN — Medication 1 MILLIGRAM(S): at 11:00

## 2019-12-19 RX ADMIN — Medication 125 MICROGRAM(S): at 05:08

## 2019-12-19 RX ADMIN — LIDOCAINE 1 PATCH: 4 CREAM TOPICAL at 11:01

## 2019-12-19 RX ADMIN — GABAPENTIN 100 MILLIGRAM(S): 400 CAPSULE ORAL at 20:49

## 2019-12-19 RX ADMIN — MEROPENEM 100 MILLIGRAM(S): 1 INJECTION INTRAVENOUS at 20:53

## 2019-12-19 RX ADMIN — ENOXAPARIN SODIUM 40 MILLIGRAM(S): 100 INJECTION SUBCUTANEOUS at 11:01

## 2019-12-19 RX ADMIN — Medication 5 MILLIGRAM(S): at 05:08

## 2019-12-19 RX ADMIN — Medication 3 MILLILITER(S): at 20:55

## 2019-12-19 RX ADMIN — PANTOPRAZOLE SODIUM 40 MILLIGRAM(S): 20 TABLET, DELAYED RELEASE ORAL at 05:09

## 2019-12-19 RX ADMIN — DIVALPROEX SODIUM 250 MILLIGRAM(S): 500 TABLET, DELAYED RELEASE ORAL at 23:22

## 2019-12-19 RX ADMIN — Medication 2 MILLIGRAM(S): at 17:35

## 2019-12-19 RX ADMIN — Medication 0.5 MILLIGRAM(S): at 19:24

## 2019-12-19 RX ADMIN — ATORVASTATIN CALCIUM 40 MILLIGRAM(S): 80 TABLET, FILM COATED ORAL at 20:51

## 2019-12-19 RX ADMIN — Medication 5 MILLIGRAM(S): at 20:52

## 2019-12-19 NOTE — PROGRESS NOTE BEHAVIORAL HEALTH - NSBHCONSULTFOLLOWDETAILS_PSY_A_CORE FT
pt will likely need psych admission pending response to current meds regimen and clearance from primary team pt will likely need psych admission pending medical clearance

## 2019-12-19 NOTE — PROGRESS NOTE ADULT - ASSESSMENT
_________________________________________________________________________________________  ========>>  M E D I C A L   A T T E N D I N G    F O L L O W  U P  N O T E  <<=========  -----------------------------------------------------------------------------------------------------    - Patient seen and examined by me earlier today.   - In summary,  SHEKHAR VASQUEZ is a 69y year old woman who originally presented with leg infection   - Patient today overall doing ok, comfortable, eating OK.     no other events otherwise     ==================>> REVIEW OF SYSTEM <<=================    GEN: no fever, no chills, + mild pain in legs   RESP: no SOB now, no cough, no sputum  CVS: no chest pain now, no palpitations, + edema, unchanged  ( pt states at home does do ACE wrapping at times)   GI: no abdominal pain, no nausea, no constipation, no diarrhea  : no dysuria, no frequency, no hematuria  Neuro: no headache, no dizziness  Derm : no itching, no rash    ==================>> PHYSICAL EXAM <<=================    GEN: A&O X 3 , NAD , comfortable  HEENT: NCAT, PERRL, MMM, hearing intact  Neck: supple , no JVD  CVS: S1S2 , regular , No M/R/G appreciated  PULM: CTA B/L,  no W/R/R appreciated  ABD.: soft. non tender, non distended,  bowel sounds present  Extrem: intact pulses , + Bilateral LE edema stable        wound on left leg dressed   PSYCH : a bit anxious       ==================>> MEDICATIONS <<====================    ALBUTerol    90 MICROgram(s) HFA Inhaler 1 Puff(s) Inhalation every 4 hours  amLODIPine   Tablet 2.5 milliGRAM(s) Oral daily  atorvastatin 40 milliGRAM(s) Oral at bedtime  baclofen 5 milliGRAM(s) Oral three times a day  cholecalciferol 2000 Unit(s) Oral daily  clopidogrel Tablet 75 milliGRAM(s) Oral daily  cyanocobalamin 1000 MICROGram(s) Oral daily  enoxaparin Injectable 40 milliGRAM(s) SubCutaneous daily  fluticasone propionate 50 MICROgram(s)/spray Nasal Spray 1 Spray(s) Both Nostrils two times a day  folic acid 1 milliGRAM(s) Oral daily  gabapentin 100 milliGRAM(s) Oral three times a day  levothyroxine 125 MICROGram(s) Oral daily  lidocaine   Patch 1 Patch Transdermal daily  losartan 100 milliGRAM(s) Oral daily  melatonin 3 milliGRAM(s) Oral at bedtime  meropenem  IVPB 1000 milliGRAM(s) IV Intermittent every 8 hours  montelukast 10 milliGRAM(s) Oral daily  OLANZapine 10 milliGRAM(s) Oral at bedtime  pantoprazole    Tablet 40 milliGRAM(s) Oral before breakfast  tiotropium 18 MICROgram(s) Capsule 1 Capsule(s) Inhalation daily  trihexyphenidyl 2 milliGRAM(s) Oral two times a day    MEDICATIONS  (PRN):  acetaminophen   Tablet .. 650 milliGRAM(s) Oral every 6 hours PRN Temp greater or equal to 38C (100.4F)  albuterol/ipratropium for Nebulization 3 milliLiter(s) Nebulizer every 6 hours PRN Shortness of Breath and/or Wheezing  LORazepam     Tablet 0.5 milliGRAM(s) Oral every 4 hours PRN anxiety or agitation  oxyCODONE    IR 5 milliGRAM(s) Oral every 6 hours PRN moderate or severe pain    ==================>> VITAL SIGNS <<==================    Vital Signs Last 24 Hrs  T(C): 36.7 (12-19-19 @ 04:21)  T(F): 98 (12-19-19 @ 04:21), Max: 99.5 (12-18-19 @ 14:47)  HR: 79 (12-19-19 @ 04:21) (79 - 88)  BP: 112/68 (12-19-19 @ 04:21)  RR: 18 (12-19-19 @ 04:21) (17 - 18)  SpO2: 95% (12-19-19 @ 04:21) (95% - 97%)       ==================>> LAB AND IMAGING <<==================                        9.9    10.49 )-----------( x        ( 19 Dec 2019 08:12 )             32.7        143  |  102  |  8   ----------------------------<  100<H>  3.5   |  30  |  0.57    Ca    9.0      19 Dec 2019 06:43    WBC count:   10.49 <<== ,  15.46 <<== ,  13.49 <<== ,  24.76 <<== ,  21.32 <<== ,  18.27 <<==   Hemoglobin:   9.9 <<==,  11.3 <<==,  10.8 <<==,  10.7 <<==,  11.0 <<==,  11.0 <<==  platelets:  124 <==, 131 <==, 159 <==, 180 <==, 203 <==    Creatinine:  0.57  <<==, 0.47  <<==, 0.66  <<==, 0.63  <<==, 0.52  <<==, 0.51  <<==  Sodium:   143  <==, 141  <==, 141  <==, 144  <==, 143  <==, 145  <==, 142  <==       AST:          9 <== , 29 <== , 35 <== , 10 <==      ALT:        27  <== , 47  <== , 49  <== , 23  <==      AP:        59  <=, 58  <=, 62  <=, 67  <=     Bili:        0.9  <=, 0.7  <=, 0.6  <=, 0.5  <=    _______________________  C U L T U R E S :    Culture - Urine (collected 15 Dec 2019 03:30)  Source: .Urine Catheterized  Final Report (17 Dec 2019 08:39):    50,000 - 99,000 CFU/mL Escherichia coli ESBL    <10,000 CFU/ml Normal Urogenital love present  Organism: Escherichia coli ESBL (17 Dec 2019 08:39)  Organism: Escherichia coli ESBL (17 Dec 2019 08:39)    Sensitivities:      -  Amikacin: S <=16      -  Ampicillin: R >16 These ampicillin results predict results for amoxicillin      -  Ampicillin/Sulbactam: R 16/8 Enterobacter, Citrobacter, and Serratia may develop resistance during prolonged therapy (3-4 days)      -  Aztreonam: R 16      -  Cefazolin: R >16 (MIC_CL_COM_ENTERIC_CEFAZU) For uncomplicated UTI with K. pneumoniae, E. coli, or P. mirablis: MARIA M <=16 is sensitive and MARIA M >=32 is resistant. This also predicts results for oral agents cefaclor, cefdinir, cefpodoxime, cefprozil, cefuroxime axetil, cephalexin and locarbef for uncomplicated UTI. Note that some isolates may be susceptible to these agents while testing resistant to cefazolin.      -  Cefepime: R >16      -  Cefoxitin: S <=8      -  Ceftriaxone: R >32 Enterobacter, Citrobacter, and Serratia may develop resistance during prolonged therapy      -  Ciprofloxacin: R >2      -  Ertapenem: S <=1      -  Gentamicin: S <=4      -  Imipenem: S <=1      -  Levofloxacin: R >4      -  Meropenem: S <=1      -  Nitrofurantoin: S <=32 Should not be used to treat pyelonephritis      -  Piperacillin/Tazobactam: R <=16      -  Tigecycline: S <=2      -  Tobramycin: S <=4      -  Trimethoprim/Sulfamethoxazole: R >2/38      Method Type: MARIA M    Culture - Blood (collected 15 Dec 2019 03:28)  Source: .Blood Blood-Peripheral  Preliminary Report (16 Dec 2019 04:01):    No growth to date.    Culture - Blood (collected 15 Dec 2019 03:28)  Source: .Blood Blood  Preliminary Report (16 Dec 2019 04:01):    No growth to date.    ___________________________________________________________________________________  ===============>>  A S S E S S M E N T   A N D   P L A N <<===============  ------------------------------------------------------------------------------------------    · Assessment		  69 F PMH  Anxiety, HTN, HLD , NOEMY, asthma, COPD, DVT no longer on a/c , remote Breast Cancer, Kidney cancer  in remission , Graves disease, Hypothyroid , S/P laminectomy , carpal tunnel syndrome, Obesity, multiple admissions for sob in past attributed to anxiety/panic attacks, now p/w b/l LE edema and pain.      Problem/Plan - 1:  ·  Problem: Cellulitis of lower extremity / infected left leg wound   --ID eval and mgmt appreciated   -continue IV abx   no DVT on duplex  seen   local wound care / wound care team   treat edema with diuretics   leg elevation reiterated     Problem/Plan - 2:  ·  Problem:  Bacteriuria  now post fever  -ID appreciated   -cultures as above   - abx upgraded given fever yesterday: now more stable     Problem/Plan - 3:  ·  Problem: Mood disorder.  Plan: -Pt with complex anxiety spectrum disorder, paranoid delusion disorder with mood issues as noted on prior extensive behavioral health eval from admission 11/2019.  - psych following  - Continue Current medications and monitor.   - pt has had prior inpatient psych admissions: psych to eval further treatment need and possible inpatient transfer   - social work eval and follow up ( pt has previously made unproven statements about her  )     Problem/Plan - 4:  ·  Problem: Other chronic pain.  Plan: -c/w baclofen  - Continue Current medications and monitor.     Problem/Plan - 5:  ·  Problem: Debility  - PT / OOB as able   -fall, asp, sz prec    Problem/Plan - 6:  Problem: Essential hypertension.  -c/w home losartan, norvasc.  - cardio following     Problem/Plan - 7:  ·  Problem: Prophylactic measure.  Plan: -lovenox vte ppx.     --------------------------------------------  Case discussed with pt, NP,   Education given on findings and plan of care  ___________________________  H. JANICE Welch.  Pager: [phone] _________________________________________________________________________________________  ========>>  M E D I C A L   A T T E N D I N G    F O L L O W  U P  N O T E  <<=========  -----------------------------------------------------------------------------------------------------    - Patient seen and examined by me earlier today.   - In summary,  SHEKHAR VASQUEZ is a 69y year old woman who originally presented with leg infection   - Patient today overall doing ok, comfortable, eating OK.     no other events otherwise     ==================>> REVIEW OF SYSTEM <<=================    GEN: no fever, no chills, + mild pain in legs   RESP: no SOB now, no cough, no sputum  CVS: no chest pain now, no palpitations, + edema, unchanged  ( pt states at home does do ACE wrapping at times)   GI: no abdominal pain, no nausea, no constipation, no diarrhea  : no dysuria, no frequency, no hematuria  Neuro: no headache, no dizziness  Derm : no itching, no rash    ==================>> PHYSICAL EXAM <<=================    GEN: A&O X 3 , NAD , comfortable  HEENT: NCAT, PERRL, MMM, hearing intact  Neck: supple , no JVD  CVS: S1S2 , regular , No M/R/G appreciated  PULM: CTA B/L,  no W/R/R appreciated  ABD.: soft. non tender, non distended,  bowel sounds present  Extrem: intact pulses , + Bilateral LE edema stable        wound on left leg dressed         scattered ecchymotic areas throughout the body   PSYCH : a bit anxious       ==================>> MEDICATIONS <<====================    ALBUTerol    90 MICROgram(s) HFA Inhaler 1 Puff(s) Inhalation every 4 hours  amLODIPine   Tablet 2.5 milliGRAM(s) Oral daily  atorvastatin 40 milliGRAM(s) Oral at bedtime  baclofen 5 milliGRAM(s) Oral three times a day  cholecalciferol 2000 Unit(s) Oral daily  clopidogrel Tablet 75 milliGRAM(s) Oral daily  cyanocobalamin 1000 MICROGram(s) Oral daily  enoxaparin Injectable 40 milliGRAM(s) SubCutaneous daily  fluticasone propionate 50 MICROgram(s)/spray Nasal Spray 1 Spray(s) Both Nostrils two times a day  folic acid 1 milliGRAM(s) Oral daily  gabapentin 100 milliGRAM(s) Oral three times a day  levothyroxine 125 MICROGram(s) Oral daily  lidocaine   Patch 1 Patch Transdermal daily  losartan 100 milliGRAM(s) Oral daily  melatonin 3 milliGRAM(s) Oral at bedtime  meropenem  IVPB 1000 milliGRAM(s) IV Intermittent every 8 hours  montelukast 10 milliGRAM(s) Oral daily  OLANZapine 10 milliGRAM(s) Oral at bedtime  pantoprazole    Tablet 40 milliGRAM(s) Oral before breakfast  tiotropium 18 MICROgram(s) Capsule 1 Capsule(s) Inhalation daily  trihexyphenidyl 2 milliGRAM(s) Oral two times a day    MEDICATIONS  (PRN):  acetaminophen   Tablet .. 650 milliGRAM(s) Oral every 6 hours PRN Temp greater or equal to 38C (100.4F)  albuterol/ipratropium for Nebulization 3 milliLiter(s) Nebulizer every 6 hours PRN Shortness of Breath and/or Wheezing  LORazepam     Tablet 0.5 milliGRAM(s) Oral every 4 hours PRN anxiety or agitation  oxyCODONE    IR 5 milliGRAM(s) Oral every 6 hours PRN moderate or severe pain    ==================>> VITAL SIGNS <<==================    Vital Signs Last 24 Hrs  T(C): 36.7 (12-19-19 @ 04:21)  T(F): 98 (12-19-19 @ 04:21), Max: 99.5 (12-18-19 @ 14:47)  HR: 79 (12-19-19 @ 04:21) (79 - 88)  BP: 112/68 (12-19-19 @ 04:21)  RR: 18 (12-19-19 @ 04:21) (17 - 18)  SpO2: 95% (12-19-19 @ 04:21) (95% - 97%)       ==================>> LAB AND IMAGING <<==================                        9.9    10.49 )-----------( x        ( 19 Dec 2019 08:12 )             32.7        143  |  102  |  8   ----------------------------<  100<H>  3.5   |  30  |  0.57    Ca    9.0      19 Dec 2019 06:43    WBC count:   10.49 <<== ,  15.46 <<== ,  13.49 <<== ,  24.76 <<== ,  21.32 <<== ,  18.27 <<==   Hemoglobin:   9.9 <<==,  11.3 <<==,  10.8 <<==,  10.7 <<==,  11.0 <<==,  11.0 <<==  platelets:  124 <==, 131 <==, 159 <==, 180 <==, 203 <==    Creatinine:  0.57  <<==, 0.47  <<==, 0.66  <<==, 0.63  <<==, 0.52  <<==, 0.51  <<==  Sodium:   143  <==, 141  <==, 141  <==, 144  <==, 143  <==, 145  <==, 142  <==       AST:          9 <== , 29 <== , 35 <== , 10 <==      ALT:        27  <== , 47  <== , 49  <== , 23  <==      AP:        59  <=, 58  <=, 62  <=, 67  <=     Bili:        0.9  <=, 0.7  <=, 0.6  <=, 0.5  <=    _______________________  C U L T U R E S :    Culture - Urine (collected 15 Dec 2019 03:30)  Source: .Urine Catheterized  Final Report (17 Dec 2019 08:39):    50,000 - 99,000 CFU/mL Escherichia coli ESBL    <10,000 CFU/ml Normal Urogenital love present  Organism: Escherichia coli ESBL (17 Dec 2019 08:39)  Organism: Escherichia coli ESBL (17 Dec 2019 08:39)    Sensitivities:      -  Amikacin: S <=16      -  Ampicillin: R >16 These ampicillin results predict results for amoxicillin      -  Ampicillin/Sulbactam: R 16/8 Enterobacter, Citrobacter, and Serratia may develop resistance during prolonged therapy (3-4 days)      -  Aztreonam: R 16      -  Cefazolin: R >16 (MIC_CL_COM_ENTERIC_CEFAZU) For uncomplicated UTI with K. pneumoniae, E. coli, or P. mirablis: MARIA M <=16 is sensitive and MARIA M >=32 is resistant. This also predicts results for oral agents cefaclor, cefdinir, cefpodoxime, cefprozil, cefuroxime axetil, cephalexin and locarbef for uncomplicated UTI. Note that some isolates may be susceptible to these agents while testing resistant to cefazolin.      -  Cefepime: R >16      -  Cefoxitin: S <=8      -  Ceftriaxone: R >32 Enterobacter, Citrobacter, and Serratia may develop resistance during prolonged therapy      -  Ciprofloxacin: R >2      -  Ertapenem: S <=1      -  Gentamicin: S <=4      -  Imipenem: S <=1      -  Levofloxacin: R >4      -  Meropenem: S <=1      -  Nitrofurantoin: S <=32 Should not be used to treat pyelonephritis      -  Piperacillin/Tazobactam: R <=16      -  Tigecycline: S <=2      -  Tobramycin: S <=4      -  Trimethoprim/Sulfamethoxazole: R >2/38      Method Type: MARIA M    Culture - Blood (collected 15 Dec 2019 03:28)  Source: .Blood Blood-Peripheral  Preliminary Report (16 Dec 2019 04:01):    No growth to date.    Culture - Blood (collected 15 Dec 2019 03:28)  Source: .Blood Blood  Preliminary Report (16 Dec 2019 04:01):    No growth to date.    ___________________________________________________________________________________  ===============>>  A S S E S S M E N T   A N D   P L A N <<===============  ------------------------------------------------------------------------------------------    · Assessment		  69 F PMH  Anxiety, HTN, HLD , NOEMY, asthma, COPD, DVT no longer on a/c , remote Breast Cancer, Kidney cancer  in remission , Graves disease, Hypothyroid , S/P laminectomy , carpal tunnel syndrome, Obesity, multiple admissions for sob in past attributed to anxiety/panic attacks, now p/w b/l LE edema and pain.      Problem/Plan - 1:  ·  Problem: Cellulitis of lower extremity / infected left leg wound   --ID eval and mgmt appreciated   -continue IV abx   no DVT on duplex  seen   local wound care / wound care team   treat edema with diuretics   leg elevation reiterated     Problem/Plan - 2:  ·  Problem:  Bacteriuria  now post fever  -ID appreciated   -cultures as above   - abx upgraded given fever yesterday: now more stable     Problem/Plan - 3:  ·  Problem: Mood disorder.  Plan: -Pt with complex anxiety spectrum disorder, paranoid delusion disorder with mood issues as noted on prior extensive behavioral health eval from admission 11/2019.  - psych following  - Continue Current medications and monitor.   - pt has had prior inpatient psych admissions: psych to eval further treatment need and possible inpatient transfer   - social work eval and follow up ( pt has previously made unproven statements about her  )     Problem/Plan - 4:  ·  Problem: Other chronic pain.  Plan: -c/w baclofen  - Continue Current medications and monitor.     Problem/Plan - 5:  ·  Problem: Debility  - PT / OOB as able   -fall, asp, sz prec    Problem/Plan - 6:  Problem: Essential hypertension.  -c/w home losartan, norvasc.  - cardio following     Problem/Plan - 7:  ·  Problem: Prophylactic measure.  Plan: -lovenox vte ppx.     --------------------------------------------  Case discussed with pt, NP,   Education given on findings and plan of care  ___________________________  H. JANICE Welch.  Pager: [phone]

## 2019-12-19 NOTE — PROGRESS NOTE BEHAVIORAL HEALTH - CASE SUMMARY
69 F with PPHX of delusional disorder and anxiety disorder with multiple psychiatric admissions and PMH  Anxiety, minimally ambulatory with use of walker,  HTN, HLD , NOEMY, asthma, COPD, DVT no longer on a/c , remote Breast Cancer, Kidney cancer  in remission , Graves disease, Hypothyroid, S/P laminectomy, carpal tunnel syndrome, Obesity, multiple admissions for sob in past attributed to anxiety/panic attacks, now p/w b/l LE edema and pain.   She complains of dyspnea and is admitted medically for cellulitis of the leg and UTI.  Psychiatry is consulted due to history of anxiety and paranoia.  Pt remains paranoid towards , labile, verbally agitated but overall redirectable, no SI/HI.   states pt has been paranoid for several years, worse since her psychiatrist retired 6 mos ago, not taking meds.  12/18: Pt AOx2, could not give year, excitable and with productive speech, labile at times and tearful.  Paranoid with family members per collateral, firing her aides 2/2 paranoia, not taking medications correctly or noncompliant altogether,  with safety concerns, feels pt might require psych admission.  12/18: Labile, paranoid, disorganized, intermittently tearful and elevated. Would add VPA for mood stabilization.  Agree with resident's assessment and plan as above.

## 2019-12-19 NOTE — PROGRESS NOTE ADULT - SUBJECTIVE AND OBJECTIVE BOX
Cardiovascular Disease Progress Note    Overnight events: No acute events overnight.  appears comfortable. requesting to go home. no cp/sob at present  Otherwise review of systems negative    Objective Findings:  T(C): 36.7 (12-19-19 @ 04:21), Max: 37.5 (12-18-19 @ 14:47)  HR: 79 (12-19-19 @ 04:21) (79 - 88)  BP: 112/68 (12-19-19 @ 04:21) (112/68 - 148/55)  RR: 18 (12-19-19 @ 04:21) (17 - 18)  SpO2: 95% (12-19-19 @ 04:21) (95% - 97%)  Wt(kg): --  Daily     Daily       Physical Exam:  Gen: NAD  HEENT: EOMI  CV: RRR, normal S1 + S2, no m/r/g  Lungs: CTAB  Abd: soft, non-tender  Ext: venous changes. mild edema      Laboratory Data:                        11.3   15.46 )-----------( 124      ( 17 Dec 2019 09:11 )             37.1     12-19    143  |  102  |  8   ----------------------------<  100<H>  3.5   |  30  |  0.57    Ca    9.0      19 Dec 2019 06:43                Inpatient Medications:  MEDICATIONS  (STANDING):  ALBUTerol    90 MICROgram(s) HFA Inhaler 1 Puff(s) Inhalation every 4 hours  amLODIPine   Tablet 2.5 milliGRAM(s) Oral daily  atorvastatin 40 milliGRAM(s) Oral at bedtime  baclofen 5 milliGRAM(s) Oral three times a day  cholecalciferol 2000 Unit(s) Oral daily  clopidogrel Tablet 75 milliGRAM(s) Oral daily  cyanocobalamin 1000 MICROGram(s) Oral daily  enoxaparin Injectable 40 milliGRAM(s) SubCutaneous daily  fluticasone propionate 50 MICROgram(s)/spray Nasal Spray 1 Spray(s) Both Nostrils two times a day  folic acid 1 milliGRAM(s) Oral daily  gabapentin 100 milliGRAM(s) Oral three times a day  levothyroxine 125 MICROGram(s) Oral daily  lidocaine   Patch 1 Patch Transdermal daily  losartan 100 milliGRAM(s) Oral daily  melatonin 3 milliGRAM(s) Oral at bedtime  meropenem  IVPB 1000 milliGRAM(s) IV Intermittent every 8 hours  montelukast 10 milliGRAM(s) Oral daily  OLANZapine 10 milliGRAM(s) Oral at bedtime  pantoprazole    Tablet 40 milliGRAM(s) Oral before breakfast  tiotropium 18 MICROgram(s) Capsule 1 Capsule(s) Inhalation daily  trihexyphenidyl 2 milliGRAM(s) Oral two times a day      Assessment:  -sob  -le cellulitis  -atypical cp s/p recent LHC with normal cors  -venous insufficiency  -copd/asthma  -obesity  -melba  -severe anxiety  -hypothyroidism    Recs:  -c/w home antihypertensives and diuretics. appears euvolemic  -c/w bronchodilators  -psychiatry recs appreciated. would she benefit from inpatient psych?. i personally do not feel she should be discharged home until she is on a stable psychiatric regimen due to her multiple hospitalizations and inability to care for herself  -keep legs elevated, compression stockings and c/w diuretic. unclear why now on plavix. would attempt to clarify  -f/u ID re esbl in urine, gpc bcx. on merrem. awaiting ct abd/pelvis  -DVT ppx        Over 25 minutes spent on total encounter; more than 50% of the visit was spent counseling and/or coordinating care by the attending physician.      Juan Salcedo MD   Cardiovascular Disease  [phone]

## 2019-12-19 NOTE — CHART NOTE - NSCHARTNOTEFT_GEN_A_CORE
Medicine NP note:    Vital Signs Last 24 Hrs  T(C): 37.5 (19 Dec 2019 13:29), Max: 37.5 (18 Dec 2019 14:47)  T(F): 99.5 (19 Dec 2019 13:29), Max: 99.5 (18 Dec 2019 14:47)  HR: 85 (19 Dec 2019 13:29) (79 - 88)  BP: 152/70 (19 Dec 2019 13:29) (112/68 - 152/70)  BP(mean): --  RR: 18 (19 Dec 2019 13:29) (17 - 18)  SpO2: 95% (19 Dec 2019 13:29) (95% - 97%)     69 F with anxiety, HTN, HLD , NOEMY, asthma, COPD, DVT no longer on a/c , remote Breast Cancer, Kidney cancer  in remission , Graves disease, Hypothyroid , S/P laminectomy , carpal tunnel syndrome, Obesity, multiple admissions for sob in past attributed to anxiety/panic attacks, admitted on w/ + b/l LE edema and pain.    Pt. due for CT a/p and pt. refused  / unable to do procedure and Dr. Welch aware.  Pt. is speaking rapidly and interrupting with frantic responses ie., asking for oxy meds and stating are your sure they are coming, staff exits room and pt. quickly yelling out for "help".  Appears paranoid and anxious.  Pt. reassured multiple times with ongoing yelling form the room for meds, food.    Discussed with Dr. Welch, behavioral health recalled for input, discussed with SHELDON Pinto, DEMARCO   [phone]

## 2019-12-19 NOTE — PROGRESS NOTE BEHAVIORAL HEALTH - NSBHFUPINTERVALHXFT_PSY_A_CORE
Pt is awake and alert, orientated to person and place. Patient reports she feels "good" but becomes tearful while explaining she is also scared to be in the hospital. States her  told her she was in a hospital, but she does not believe him. She knows people organized a party for a wedding  last night but they did not invite her. She felt excluded and this made her very sad (starts crying again). Pt wants to go home as she does not like it here, and when explained she needs ongoing abx treatment, she reports that she already saw 2 doctors that gave her the go ahead to leave. Is eating well but notes she woke up in the middle of the night 2/2 to an asthma attack. Denies any SIIP/HIIP or A/V hallucinations. Declines inpt psych admission.     Per collateral from , Mr. Edwin To (), patient complaining that he leaves her alone, wants him to call the maintenance ousmane to paint the walls because he does not like the color (doesn't think she is in a hospital), and also wants to call the neighbors so they can remove all the cars from her driveway.  would like pt to be admitted to inpt psych s/p medical clearance.

## 2019-12-19 NOTE — PROGRESS NOTE BEHAVIORAL HEALTH - NSBHCONSULTRECOMMENDOTHER_PSY_A_CORE FT
Recommendations:  - c/w zyprexa to 10mg PO Q HS  - start depakote 250mg PO BID  - c/w ativan 0.5mg PO Q 6 hours PRN anxiety  - order CMP  - order EKG Recommendations:  - c/w zyprexa to 10mg PO Q HS  - start depakote 250mg PO BID  - c/w ativan 0.5mg PO Q 6 hours PRN anxiety  - LFT wnl for now, order CMP  - QTC <500 for now, order EKG

## 2019-12-19 NOTE — PROGRESS NOTE BEHAVIORAL HEALTH - SUMMARY
69 F with PPHX of delusional disorder and anxiety disorder with multiple psychiatric admissions and PMH  Anxiety, minimally ambulatory with use of walker,  HTN, HLD , NOEMY, asthma, COPD, DVT no longer on a/c , remote Breast Cancer, Kidney cancer  in remission , Graves disease, Hypothyroid , S/P laminectomy , carpal tunnel syndrome, Obesity, multiple admissions for sob in past attributed to anxiety/panic attacks, now p/w b/l LE edema and pain.   She complains of dyspnea and is admitted medically for cellulitis of the leg and UTI.  Psychiatry is consulted due to history of anxiety and current anxiety and tearfulness in ED as a result of her medical admission.    Originally presented with disheveled and unkempt with high anxiety, paranoia and delusions of persecution from her , suspected olfactory hallucinations related to perfumes and fear of death from medical illness. Today, pt continues to be cooperative during interview but with on going persecutory delusions and labile mood.     Recommendations:  - c/w zyprexa to 10mg PO Q HS  - start depakote 250mg PO BID  - c/w ativan 0.5mg PO Q 6 hours PRN anxiety  - order CMP  - order EKG 69 F with PPHX of delusional disorder and anxiety disorder with multiple psychiatric admissions and PMH  Anxiety, minimally ambulatory with use of walker,  HTN, HLD , NOEMY, asthma, COPD, DVT no longer on a/c , remote Breast Cancer, Kidney cancer  in remission , Graves disease, Hypothyroid , S/P laminectomy , carpal tunnel syndrome, Obesity, multiple admissions for sob in past attributed to anxiety/panic attacks, now p/w b/l LE edema and pain.   She complains of dyspnea and is admitted medically for cellulitis of the leg and UTI.  Psychiatry is consulted due to history of anxiety and current anxiety and tearfulness in ED as a result of her medical admission.    Originally presented with disheveled and unkempt with high anxiety, paranoia and delusions of persecution from her , suspected olfactory hallucinations related to perfumes and fear of death from medical illness. Today, pt continues to be cooperative during interview but with on going persecutory delusions and labile mood.     Recommendations:  - c/w zyprexa to 10mg PO Q HS  - start depakote 250mg PO BID  - c/w ativan 0.5mg PO Q 6 hours PRN anxiety  - LFT wnl for now, order CMP  - QTC <500 for now, order EKG

## 2019-12-20 LAB
ALBUMIN SERPL ELPH-MCNC: 2.8 G/DL — LOW (ref 3.3–5)
ALP SERPL-CCNC: 54 U/L — SIGNIFICANT CHANGE UP (ref 40–120)
ALT FLD-CCNC: 16 U/L — SIGNIFICANT CHANGE UP (ref 10–45)
ANION GAP SERPL CALC-SCNC: 11 MMOL/L — SIGNIFICANT CHANGE UP (ref 5–17)
AST SERPL-CCNC: 9 U/L — LOW (ref 10–40)
BILIRUB SERPL-MCNC: 0.7 MG/DL — SIGNIFICANT CHANGE UP (ref 0.2–1.2)
BUN SERPL-MCNC: 9 MG/DL — SIGNIFICANT CHANGE UP (ref 7–23)
CALCIUM SERPL-MCNC: 9.2 MG/DL — SIGNIFICANT CHANGE UP (ref 8.4–10.5)
CHLORIDE SERPL-SCNC: 103 MMOL/L — SIGNIFICANT CHANGE UP (ref 96–108)
CO2 SERPL-SCNC: 25 MMOL/L — SIGNIFICANT CHANGE UP (ref 22–31)
CREAT SERPL-MCNC: 0.55 MG/DL — SIGNIFICANT CHANGE UP (ref 0.5–1.3)
CULTURE RESULTS: SIGNIFICANT CHANGE UP
CULTURE RESULTS: SIGNIFICANT CHANGE UP
GLUCOSE SERPL-MCNC: 132 MG/DL — HIGH (ref 70–99)
HCT VFR BLD CALC: 30.9 % — LOW (ref 34.5–45)
HGB BLD-MCNC: 9.4 G/DL — LOW (ref 11.5–15.5)
MCHC RBC-ENTMCNC: 27.5 PG — SIGNIFICANT CHANGE UP (ref 27–34)
MCHC RBC-ENTMCNC: 30.4 GM/DL — LOW (ref 32–36)
MCV RBC AUTO: 90.4 FL — SIGNIFICANT CHANGE UP (ref 80–100)
NRBC # BLD: 0 /100 WBCS — SIGNIFICANT CHANGE UP (ref 0–0)
PLATELET # BLD AUTO: 97 K/UL — LOW (ref 150–400)
POTASSIUM SERPL-MCNC: 3.5 MMOL/L — SIGNIFICANT CHANGE UP (ref 3.5–5.3)
POTASSIUM SERPL-SCNC: 3.5 MMOL/L — SIGNIFICANT CHANGE UP (ref 3.5–5.3)
PROT SERPL-MCNC: 5.9 G/DL — LOW (ref 6–8.3)
RBC # BLD: 3.42 M/UL — LOW (ref 3.8–5.2)
RBC # FLD: 16.3 % — HIGH (ref 10.3–14.5)
SODIUM SERPL-SCNC: 139 MMOL/L — SIGNIFICANT CHANGE UP (ref 135–145)
SPECIMEN SOURCE: SIGNIFICANT CHANGE UP
SPECIMEN SOURCE: SIGNIFICANT CHANGE UP
WBC # BLD: 9.74 K/UL — SIGNIFICANT CHANGE UP (ref 3.8–10.5)
WBC # FLD AUTO: 9.74 K/UL — SIGNIFICANT CHANGE UP (ref 3.8–10.5)

## 2019-12-20 PROCEDURE — 93010 ELECTROCARDIOGRAM REPORT: CPT

## 2019-12-20 PROCEDURE — 99232 SBSQ HOSP IP/OBS MODERATE 35: CPT | Mod: GC

## 2019-12-20 RX ORDER — DIVALPROEX SODIUM 500 MG/1
250 TABLET, DELAYED RELEASE ORAL ONCE
Refills: 0 | Status: COMPLETED | OUTPATIENT
Start: 2019-12-20 | End: 2019-12-20

## 2019-12-20 RX ADMIN — Medication 0.5 MILLIGRAM(S): at 16:07

## 2019-12-20 RX ADMIN — OXYCODONE HYDROCHLORIDE 5 MILLIGRAM(S): 5 TABLET ORAL at 12:19

## 2019-12-20 RX ADMIN — Medication 2 MILLIGRAM(S): at 05:34

## 2019-12-20 RX ADMIN — Medication 3 MILLILITER(S): at 22:38

## 2019-12-20 RX ADMIN — PANTOPRAZOLE SODIUM 40 MILLIGRAM(S): 20 TABLET, DELAYED RELEASE ORAL at 05:37

## 2019-12-20 RX ADMIN — LOSARTAN POTASSIUM 100 MILLIGRAM(S): 100 TABLET, FILM COATED ORAL at 05:34

## 2019-12-20 RX ADMIN — ATORVASTATIN CALCIUM 40 MILLIGRAM(S): 80 TABLET, FILM COATED ORAL at 21:05

## 2019-12-20 RX ADMIN — Medication 0.5 MILLIGRAM(S): at 15:07

## 2019-12-20 RX ADMIN — Medication 5 MILLIGRAM(S): at 21:04

## 2019-12-20 RX ADMIN — Medication 5 MILLIGRAM(S): at 05:35

## 2019-12-20 RX ADMIN — MONTELUKAST 10 MILLIGRAM(S): 4 TABLET, CHEWABLE ORAL at 11:29

## 2019-12-20 RX ADMIN — AMLODIPINE BESYLATE 2.5 MILLIGRAM(S): 2.5 TABLET ORAL at 05:35

## 2019-12-20 RX ADMIN — Medication 125 MICROGRAM(S): at 05:34

## 2019-12-20 RX ADMIN — LIDOCAINE 1 PATCH: 4 CREAM TOPICAL at 11:29

## 2019-12-20 RX ADMIN — LIDOCAINE 1 PATCH: 4 CREAM TOPICAL at 19:49

## 2019-12-20 RX ADMIN — OXYCODONE HYDROCHLORIDE 5 MILLIGRAM(S): 5 TABLET ORAL at 05:19

## 2019-12-20 RX ADMIN — GABAPENTIN 100 MILLIGRAM(S): 400 CAPSULE ORAL at 21:05

## 2019-12-20 RX ADMIN — OXYCODONE HYDROCHLORIDE 5 MILLIGRAM(S): 5 TABLET ORAL at 22:17

## 2019-12-20 RX ADMIN — Medication 2 MILLIGRAM(S): at 17:54

## 2019-12-20 RX ADMIN — DIVALPROEX SODIUM 250 MILLIGRAM(S): 500 TABLET, DELAYED RELEASE ORAL at 23:42

## 2019-12-20 RX ADMIN — Medication 1 MILLIGRAM(S): at 11:30

## 2019-12-20 RX ADMIN — Medication 0.5 MILLIGRAM(S): at 10:21

## 2019-12-20 RX ADMIN — Medication 5 MILLIGRAM(S): at 13:59

## 2019-12-20 RX ADMIN — OXYCODONE HYDROCHLORIDE 5 MILLIGRAM(S): 5 TABLET ORAL at 06:10

## 2019-12-20 RX ADMIN — OXYCODONE HYDROCHLORIDE 5 MILLIGRAM(S): 5 TABLET ORAL at 21:03

## 2019-12-20 RX ADMIN — GABAPENTIN 100 MILLIGRAM(S): 400 CAPSULE ORAL at 05:34

## 2019-12-20 RX ADMIN — MEROPENEM 100 MILLIGRAM(S): 1 INJECTION INTRAVENOUS at 05:34

## 2019-12-20 RX ADMIN — OXYCODONE HYDROCHLORIDE 5 MILLIGRAM(S): 5 TABLET ORAL at 13:19

## 2019-12-20 RX ADMIN — Medication 0.5 MILLIGRAM(S): at 22:37

## 2019-12-20 RX ADMIN — Medication 3 MILLILITER(S): at 05:34

## 2019-12-20 RX ADMIN — Medication 2000 UNIT(S): at 11:30

## 2019-12-20 RX ADMIN — OLANZAPINE 10 MILLIGRAM(S): 15 TABLET, FILM COATED ORAL at 21:06

## 2019-12-20 RX ADMIN — Medication 0.5 MILLIGRAM(S): at 03:34

## 2019-12-20 RX ADMIN — MEROPENEM 100 MILLIGRAM(S): 1 INJECTION INTRAVENOUS at 14:04

## 2019-12-20 RX ADMIN — Medication 3 MILLIGRAM(S): at 21:06

## 2019-12-20 RX ADMIN — LIDOCAINE 1 PATCH: 4 CREAM TOPICAL at 22:32

## 2019-12-20 RX ADMIN — DIVALPROEX SODIUM 250 MILLIGRAM(S): 500 TABLET, DELAYED RELEASE ORAL at 09:25

## 2019-12-20 RX ADMIN — GABAPENTIN 100 MILLIGRAM(S): 400 CAPSULE ORAL at 13:59

## 2019-12-20 RX ADMIN — ENOXAPARIN SODIUM 40 MILLIGRAM(S): 100 INJECTION SUBCUTANEOUS at 11:30

## 2019-12-20 RX ADMIN — MEROPENEM 100 MILLIGRAM(S): 1 INJECTION INTRAVENOUS at 21:06

## 2019-12-20 RX ADMIN — PREGABALIN 1000 MICROGRAM(S): 225 CAPSULE ORAL at 11:30

## 2019-12-20 NOTE — PROGRESS NOTE BEHAVIORAL HEALTH - NSBHCONSULTRECOMMENDOTHER_PSY_A_CORE FT
Recommendations:  - c/w zyprexa to 10mg PO Q HS  - c/w depakote 250mg PO BID  - c/w ativan 0.5mg PO Q 6 hours PRN anxiety  - LFT wnl for now, cont to trend LFTs  - QTC <500 for now, order EKG

## 2019-12-20 NOTE — PROGRESS NOTE ADULT - ASSESSMENT
_________________________________________________________________________________________  ========>>  M E D I C A L   A T T E N D I N G    F O L L O W  U P  N O T E  <<=========  -----------------------------------------------------------------------------------------------------    - Patient seen and examined by me earlier today.   - In summary,  SHEKHAR VASQUEZ is a 69y year old woman who originally presented with leg infection   - Patient today overall doing ok, comfortable, eating OK.     no other events otherwise     ==================>> REVIEW OF SYSTEM <<=================    GEN: no fever, no chills, + mild pain in legs   RESP: no SOB now, no cough, no sputum  CVS: no chest pain now, no palpitations, + edema, unchanged   GI: no abdominal pain, no nausea  : no dysuria, no frequency, no hematuria  Neuro: no headache, no dizziness  Derm : no itching, no rash    ==================>> PHYSICAL EXAM <<=================    GEN: A&O X 3 , NAD , comfortable  HEENT: NCAT, PERRL, MMM, hearing intact  Neck: supple , no JVD  CVS: S1S2 , regular , No M/R/G appreciated  PULM: CTA B/L,  no W/R/R appreciated  ABD.: soft. non tender, non distended,  bowel sounds present  Extrem: intact pulses , + Bilateral LE edema stable        wound on left leg dressed         scattered ecchymotic areas throughout the body   PSYCH : +  anxious       ==================>> MEDICATIONS <<====================    ALBUTerol    90 MICROgram(s) HFA Inhaler 1 Puff(s) Inhalation every 4 hours  amLODIPine   Tablet 2.5 milliGRAM(s) Oral daily  atorvastatin 40 milliGRAM(s) Oral at bedtime  baclofen 5 milliGRAM(s) Oral three times a day  cholecalciferol 2000 Unit(s) Oral daily  clopidogrel Tablet 75 milliGRAM(s) Oral daily  cyanocobalamin 1000 MICROGram(s) Oral daily  diVALproex  milliGRAM(s) Oral two times a day  enoxaparin Injectable 40 milliGRAM(s) SubCutaneous daily  fluticasone propionate 50 MICROgram(s)/spray Nasal Spray 1 Spray(s) Both Nostrils two times a day  folic acid 1 milliGRAM(s) Oral daily  gabapentin 100 milliGRAM(s) Oral three times a day  levothyroxine 125 MICROGram(s) Oral daily  lidocaine   Patch 1 Patch Transdermal daily  losartan 100 milliGRAM(s) Oral daily  melatonin 3 milliGRAM(s) Oral at bedtime  meropenem  IVPB 1000 milliGRAM(s) IV Intermittent every 8 hours  montelukast 10 milliGRAM(s) Oral daily  OLANZapine 10 milliGRAM(s) Oral at bedtime  pantoprazole    Tablet 40 milliGRAM(s) Oral before breakfast  tiotropium 18 MICROgram(s) Capsule 1 Capsule(s) Inhalation daily  trihexyphenidyl 2 milliGRAM(s) Oral two times a day    MEDICATIONS  (PRN):  acetaminophen   Tablet .. 650 milliGRAM(s) Oral every 6 hours PRN Temp greater or equal to 38C (100.4F)  albuterol/ipratropium for Nebulization 3 milliLiter(s) Nebulizer every 6 hours PRN Shortness of Breath and/or Wheezing  LORazepam     Tablet 0.5 milliGRAM(s) Oral every 4 hours PRN anxiety or agitation  oxyCODONE    IR 5 milliGRAM(s) Oral every 6 hours PRN moderate or severe pain    ==================>> VITAL SIGNS <<==================    Vital Signs Last 24 Hrs  T(C): 37.4 (12-20-19 @ 10:25)  T(F): 99.4 (12-20-19 @ 10:25), Max: 99.5 (12-19-19 @ 13:29)  HR: 69 (12-20-19 @ 04:39) (69 - 87)  BP: 156/80 (12-20-19 @ 04:39)  RR: 18 (12-20-19 @ 04:39) (18 - 18)  SpO2: 97% (12-20-19 @ 04:39) (95% - 97%)       ==================>> LAB AND IMAGING <<==================                        9.4    9.74  )-----------( 97       ( 20 Dec 2019 06:29 )             30.9        12-20    139  |  103  |  9   ----------------------------<  132<H>  3.5   |  25  |  0.55    Ca    9.2      20 Dec 2019 06:29    TPro  5.9<L>  /  Alb  2.8<L>  /  TBili  0.7  /  DBili  x   /  AST  9<L>  /  ALT  16  /  AlkPhos  54  12-20    WBC count:   9.74 <<== ,  10.49 <<== ,  15.46 <<== ,  13.49 <<==   Hemoglobin:   9.4 <<==,  9.9 <<==,  11.3 <<==,  10.8 <<==  platelets:  97 <==, 95 <==, 124 <==, 131 <==, 159 <==, 180 <==, 203 <==    Creatinine:  0.55  <<==, 0.57  <<==, 0.47  <<==, 0.66  <<==, 0.63  <<==, 0.52  <<==  Sodium:   139  <==, 143  <==, 141  <==, 141  <==, 144  <==, 143  <==, 145  <==       AST:          9 <== , 9 <==      ALT:        16  <== , 27  <==      AP:        54  <=, 59  <=     Bili:        0.7  <=, 0.9  <=    _______________________  C U L T U R E S :    Culture - Urine (collected 15 Dec 2019 03:30)  Source: .Urine Catheterized  Final Report (17 Dec 2019 08:39):    50,000 - 99,000 CFU/mL Escherichia coli ESBL    <10,000 CFU/ml Normal Urogenital love present  Organism: Escherichia coli ESBL (17 Dec 2019 08:39)  Organism: Escherichia coli ESBL (17 Dec 2019 08:39)    Sensitivities:      -  Amikacin: S <=16      -  Ampicillin: R >16 These ampicillin results predict results for amoxicillin      -  Ampicillin/Sulbactam: R 16/8 Enterobacter, Citrobacter, and Serratia may develop resistance during prolonged therapy (3-4 days)      -  Aztreonam: R 16      -  Cefazolin: R >16 (MIC_CL_COM_ENTERIC_CEFAZU) For uncomplicated UTI with K. pneumoniae, E. coli, or P. mirablis: MARIA M <=16 is sensitive and MARIA M >=32 is resistant. This also predicts results for oral agents cefaclor, cefdinir, cefpodoxime, cefprozil, cefuroxime axetil, cephalexin and locarbef for uncomplicated UTI. Note that some isolates may be susceptible to these agents while testing resistant to cefazolin.      -  Cefepime: R >16      -  Cefoxitin: S <=8      -  Ceftriaxone: R >32 Enterobacter, Citrobacter, and Serratia may develop resistance during prolonged therapy      -  Ciprofloxacin: R >2      -  Ertapenem: S <=1      -  Gentamicin: S <=4      -  Imipenem: S <=1      -  Levofloxacin: R >4      -  Meropenem: S <=1      -  Nitrofurantoin: S <=32 Should not be used to treat pyelonephritis      -  Piperacillin/Tazobactam: R <=16      -  Tigecycline: S <=2      -  Tobramycin: S <=4      -  Trimethoprim/Sulfamethoxazole: R >2/38      Method Type: MARIA M    Culture - Blood (collected 15 Dec 2019 03:28)  Source: .Blood Blood-Peripheral  Preliminary Report (16 Dec 2019 04:01):    No growth to date.    Culture - Blood (collected 15 Dec 2019 03:28)  Source: .Blood Blood  Preliminary Report (16 Dec 2019 04:01):    No growth to date.    ___________________________________________________________________________________  ===============>>  A S S E S S M E N T   A N D   P L A N <<===============  ------------------------------------------------------------------------------------------    · Assessment		  69 F PMH  Anxiety, HTN, HLD , NOEMY, asthma, COPD, DVT no longer on a/c , remote Breast Cancer, Kidney cancer  in remission , Graves disease, Hypothyroid , S/P laminectomy , carpal tunnel syndrome, Obesity, multiple admissions for sob in past attributed to anxiety/panic attacks, now p/w b/l LE edema and pain.      Problem/Plan - 1:  ·  Problem: Cellulitis of lower extremity / infected left leg wound   --ID eval and mgmt appreciated   -continue IV abx   no DVT on duplex  seen   local wound care    treat edema with diuretics   leg elevation reiterated     Problem/Plan - 2:  ·  Problem:  Bacteriuria  now post fever  -ID appreciated   -cultures as above   - abx upgraded given fever yesterday: now more stable     Problem/Plan - 3:  ·  Problem: Mood disorder.  Plan: -Pt with complex anxiety spectrum disorder, paranoid delusion disorder with mood issues as noted on prior extensive behavioral health eval from admission 11/2019.  - psych following  - Continue Current medications and monitor.   - pt has had prior inpatient psych admissions: psych to eval further treatment need and possible inpatient transfer     Problem/Plan - 4:  ·  Problem: Other chronic pain.  Plan: -c/w baclofen  - Continue Current medications and monitor.     Problem/Plan - 5:  ·  Problem: Debility  - PT / OOB as able   -fall, asp, sz prec    Problem/Plan - 6:  Problem: Essential hypertension.  -c/w home losartan, norvasc.  - cardio following     Problem/Plan - 7:  ·  Problem: Prophylactic measure.  Plan: -lovenox vte ppx.     --------------------------------------------  Case discussed with pt, ID  Education given on findings and plan of care  ___________________________  H. JANICE Welch.  Pager: [phone]

## 2019-12-20 NOTE — PROGRESS NOTE ADULT - SUBJECTIVE AND OBJECTIVE BOX
CC: f/u for  uti  Patient reports  she needs her inhaler   REVIEW OF SYSTEMS:  All other review of systems negative (Comprehensive ROS)    Antimicrobials Day #  :4/7  meropenem  IVPB 1000 milliGRAM(s) IV Intermittent every 8 hours    Other Medications Reviewed    T(F): 99.4 (12-20-19 @ 10:25), Max: 99.4 (12-20-19 @ 10:25)  HR: 92 (12-20-19 @ 12:23)  BP: 131/79 (12-20-19 @ 12:23)  RR: 18 (12-20-19 @ 12:23)  SpO2: 95% (12-20-19 @ 12:23)  Wt(kg): --    PHYSICAL EXAM:  General: alert, very anxious  Eyes:  anicteric, no conjunctival injection, no discharge  Oropharynx: no lesions or injection 	  Neck: supple, without adenopathy  Lungs: clear to auscultation  Heart: regular rate and rhythm; no murmur, rubs or gallops  Abdomen: soft, nondistended, nontender, without mass or organomegaly  Skin: no lesions  Extremities: no clubbing, cyanosis,. legs with edema  Neurologic: alert, , moves all extremities    LAB RESULTS:                        9.4    9.74  )-----------( 97       ( 20 Dec 2019 06:29 )             30.9     12-20    139  |  103  |  9   ----------------------------<  132<H>  3.5   |  25  |  0.55    Ca    9.2      20 Dec 2019 06:29    TPro  5.9<L>  /  Alb  2.8<L>  /  TBili  0.7  /  DBili  x   /  AST  9<L>  /  ALT  16  /  AlkPhos  54  12-20    LIVER FUNCTIONS - ( 20 Dec 2019 06:29 )  Alb: 2.8 g/dL / Pro: 5.9 g/dL / ALK PHOS: 54 U/L / ALT: 16 U/L / AST: 9 U/L / GGT: x             MICROBIOLOGY:  RECENT CULTURES:  12-17 @ 21:25 .Blood Blood     Growth in aerobic bottle: Bacillus species not anthracis  "Susceptibilities not performed"    Growth in aerobic bottle: Gram Positive Rods        RADIOLOGY REVIEWED:    < from: CT Head No Cont (12.15.19 @ 16:41) >  XAM:  CT BRAIN                            PROCEDURE DATE:  12/15/2019            INTERPRETATION:    CLINICAL INDICATION: Encephalopathy, infection, lethargy      5mm axial sections of the brain were obtained from base to vertex,   without the intravenous administration of contrast material. Coronal and   sagittal computer generated reconstructed views are available.      Comparison is made with the prior MRI of 11/11/2019 and demonstrates no   significant change.    The examination is limited by out of field artifact.    Mild atrophy is identified with ventricular and sulcal prominence. Small   vessel white matter ischemic changes are noted. There is no hemorrhage or   mass.    Impression: Limited by out of field artifact. No significant change since   11/11/201    < end of copied text >            Assessment:  Patient with anxiety, obesity, asthma, stasis of legs, admitted after taking lots of ativan and pain meds, concern raised for cellulitis which is controlled and more venous stasis than anything else. She has no pyuria but does grow esbl coliform from urine, unreliable  of symptoms but leukocytosis is moderated and she had a transient fever to 102 that never came back since  on meropenem. Positive blood cx is contaminant  Plan:  3 more days of meropenem  local care to leg wound  await decision regarding inpatient psychiatry stay

## 2019-12-20 NOTE — PROGRESS NOTE ADULT - SUBJECTIVE AND OBJECTIVE BOX
Cardiovascular Disease Progress Note    Overnight events: No acute events overnight.  states feels better currently but had chest tightness in the middle of the night and needed her nebulziers  Otherwise review of systems negative    Objective Findings:  T(C): 36.7 (12-20-19 @ 04:39), Max: 37.5 (12-19-19 @ 13:29)  HR: 69 (12-20-19 @ 04:39) (69 - 87)  BP: 156/80 (12-20-19 @ 04:39) (149/60 - 156/80)  RR: 18 (12-20-19 @ 04:39) (18 - 18)  SpO2: 97% (12-20-19 @ 04:39) (95% - 97%)  Wt(kg): --  Daily     Daily       Physical Exam:  Gen: NAD  HEENT: EOMI  CV: RRR, normal S1 + S2, no m/r/g  Lungs: CTAB  Abd: soft, non-tender  Ext: trace edema        Laboratory Data:                        9.4    9.74  )-----------( 97       ( 20 Dec 2019 06:29 )             30.9     12-20    139  |  103  |  9   ----------------------------<  132<H>  3.5   |  25  |  0.55    Ca    9.2      20 Dec 2019 06:29    TPro  5.9<L>  /  Alb  2.8<L>  /  TBili  0.7  /  DBili  x   /  AST  9<L>  /  ALT  16  /  AlkPhos  54  12-20              Inpatient Medications:  MEDICATIONS  (STANDING):  ALBUTerol    90 MICROgram(s) HFA Inhaler 1 Puff(s) Inhalation every 4 hours  amLODIPine   Tablet 2.5 milliGRAM(s) Oral daily  atorvastatin 40 milliGRAM(s) Oral at bedtime  baclofen 5 milliGRAM(s) Oral three times a day  cholecalciferol 2000 Unit(s) Oral daily  clopidogrel Tablet 75 milliGRAM(s) Oral daily  cyanocobalamin 1000 MICROGram(s) Oral daily  diVALproex  milliGRAM(s) Oral two times a day  enoxaparin Injectable 40 milliGRAM(s) SubCutaneous daily  fluticasone propionate 50 MICROgram(s)/spray Nasal Spray 1 Spray(s) Both Nostrils two times a day  folic acid 1 milliGRAM(s) Oral daily  gabapentin 100 milliGRAM(s) Oral three times a day  levothyroxine 125 MICROGram(s) Oral daily  lidocaine   Patch 1 Patch Transdermal daily  losartan 100 milliGRAM(s) Oral daily  melatonin 3 milliGRAM(s) Oral at bedtime  meropenem  IVPB 1000 milliGRAM(s) IV Intermittent every 8 hours  montelukast 10 milliGRAM(s) Oral daily  OLANZapine 10 milliGRAM(s) Oral at bedtime  pantoprazole    Tablet 40 milliGRAM(s) Oral before breakfast  tiotropium 18 MICROgram(s) Capsule 1 Capsule(s) Inhalation daily  trihexyphenidyl 2 milliGRAM(s) Oral two times a day      Assessment:  -sob  -le cellulitis  -atypical cp s/p recent LHC with normal cors  -venous insufficiency  -copd/asthma  -obesity  -melba  -severe anxiety  -hypothyroidism    Recs:  -c/w home antihypertensives and diuretics. appears euvolemic  -c/w bronchodilators  -psychiatry recs appreciated. would she benefit from inpatient psych?. i personally do not feel she should be discharged home until she is on a stable psychiatric regimen due to her multiple hospitalizations and inability to care for herself  -keep legs elevated, compression stockings and c/w diuretic. unclear why now on plavix. would attempt to clarify  -f/u ID re esbl in urine, gpc bcx. on merrem. awaiting ct abd/pelvis (currently refusing)  -DVT ppx        Over 25 minutes spent on total encounter; more than 50% of the visit was spent counseling and/or coordinating care by the attending physician.      Juan Salcedo MD   Cardiovascular Disease  [phone]

## 2019-12-20 NOTE — PROGRESS NOTE BEHAVIORAL HEALTH - CASE SUMMARY
69 F with PPHX of delusional disorder and anxiety disorder with multiple psychiatric admissions and PMH  Anxiety, minimally ambulatory with use of walker,  HTN, HLD , NOEMY, asthma, COPD, DVT no longer on a/c , remote Breast Cancer, Kidney cancer  in remission , Graves disease, Hypothyroid, S/P laminectomy, carpal tunnel syndrome, Obesity, multiple admissions for sob in past attributed to anxiety/panic attacks, now p/w b/l LE edema and pain.   She complains of dyspnea and is admitted medically for cellulitis of the leg and UTI.  Psychiatry is consulted due to history of anxiety and paranoia.  Pt remains paranoid towards , labile, verbally agitated but overall redirectable, no SI/HI.   states pt has been paranoid for several years, worse since her psychiatrist retired 6 mos ago, not taking meds.  12/18: Pt AOx2, could not give year, excitable and with productive speech, labile at times and tearful.  Paranoid with family members per collateral, firing her aides 2/2 paranoia, not taking medications correctly or noncompliant altogether,  with safety concerns, feels pt might require psych admission.  12/20: VPA added for mood stabilization, less labile than yesterday but remains expansive, overinclusive, disoriented.  No SE to meds.  Restlessness continues, but has been consistent since admission, less likely akathisia/medication SE.  Agree with resident's assessment and plan as above.

## 2019-12-20 NOTE — PROGRESS NOTE BEHAVIORAL HEALTH - NSBHFUPINTERVALHXFT_PSY_A_CORE
Pt is awake and alert, orientated to person and place. For time, states it is "December 19, 1419." Sitting in chair, using her nebulizer. Patient continues to report she feels "good" and she slept well last night, but she had 2 asthma attacks. Complains the nebulizer is not working well. Also notes that around 5pm yesterday someone came by with a strong "powder" that she immediately picked up and this made her concern about another asthma attack. Does not recall mentioning that the walls need to be painted and notes it was probably her  saying does things. Reports she wants to eat, but they did not bring her what she ordered for breakfast. More redirectable than yesterday, still labile, but less than in previous interview.  States she feels safe in her room for "now."

## 2019-12-20 NOTE — PROGRESS NOTE BEHAVIORAL HEALTH - SUMMARY
69 F with PPHX of delusional disorder and anxiety disorder with multiple psychiatric admissions and PMH  Anxiety, minimally ambulatory with use of walker,  HTN, HLD , NOEMY, asthma, COPD, DVT no longer on a/c , remote Breast Cancer, Kidney cancer  in remission , Graves disease, Hypothyroid , S/P laminectomy , carpal tunnel syndrome, Obesity, multiple admissions for sob in past attributed to anxiety/panic attacks, now p/w b/l LE edema and pain.   She complains of dyspnea and is admitted medically for cellulitis of the leg and UTI.  Psychiatry is consulted due to history of anxiety and current anxiety and tearfulness in ED as a result of her medical admission.    Originally presented with disheveled and unkempt with high anxiety, paranoia and delusions of persecution from her , suspected olfactory hallucinations related to perfumes and fear of death from medical illness. Today, pt continues to be cooperative during interview and was more redirectable today; however, with ongoing persecutory delusions, olfactory hallucinations, and labile mood.     Recommendations:  - c/w zyprexa to 10mg PO Q HS  - c/w depakote 250mg PO BID  - c/w ativan 0.5mg PO Q 6 hours PRN anxiety  - LFT wnl for now, cont to trend LFTs  - QTC <500 for now, order EKG 69 F with PPHX of delusional disorder and anxiety disorder with multiple psychiatric admissions and PMH  Anxiety, minimally ambulatory with use of walker,  HTN, HLD , NOEMY, asthma, COPD, DVT no longer on a/c , remote Breast Cancer, Kidney cancer  in remission , Graves disease, Hypothyroid , S/P laminectomy , carpal tunnel syndrome, Obesity, multiple admissions for sob in past attributed to anxiety/panic attacks, now p/w b/l LE edema and pain.   She complains of dyspnea and is admitted medically for cellulitis of the leg and UTI.  Psychiatry is consulted due to history of anxiety and current anxiety and tearfulness in ED as a result of her medical admission.    Originally presented with disheveled and unkempt with high anxiety, paranoia and delusions of persecution from her , suspected olfactory hallucinations related to perfumes and fear of death from medical illness. Today, pt continues to be cooperative during interview and was more redirectable today; however, with ongoing persecutory delusions, olfactory hallucinations, and labile mood.     Recommendations:  - c/w zyprexa 10mg PO Q HS  - c/w depakote 250mg PO BID  - c/w ativan 0.5mg PO Q 6 hours PRN anxiety  - LFT wnl for now, cont to trend LFTs  - QTC <500 for now, order EKG

## 2019-12-21 RX ORDER — PANTOPRAZOLE SODIUM 20 MG/1
40 TABLET, DELAYED RELEASE ORAL ONCE
Refills: 0 | Status: COMPLETED | OUTPATIENT
Start: 2019-12-21 | End: 2019-12-21

## 2019-12-21 RX ADMIN — GABAPENTIN 100 MILLIGRAM(S): 400 CAPSULE ORAL at 14:25

## 2019-12-21 RX ADMIN — ATORVASTATIN CALCIUM 40 MILLIGRAM(S): 80 TABLET, FILM COATED ORAL at 21:07

## 2019-12-21 RX ADMIN — Medication 0.5 MILLIGRAM(S): at 04:41

## 2019-12-21 RX ADMIN — ENOXAPARIN SODIUM 40 MILLIGRAM(S): 100 INJECTION SUBCUTANEOUS at 11:04

## 2019-12-21 RX ADMIN — LIDOCAINE 1 PATCH: 4 CREAM TOPICAL at 23:18

## 2019-12-21 RX ADMIN — MEROPENEM 100 MILLIGRAM(S): 1 INJECTION INTRAVENOUS at 21:07

## 2019-12-21 RX ADMIN — Medication 3 MILLILITER(S): at 05:23

## 2019-12-21 RX ADMIN — AMLODIPINE BESYLATE 2.5 MILLIGRAM(S): 2.5 TABLET ORAL at 05:22

## 2019-12-21 RX ADMIN — Medication 5 MILLIGRAM(S): at 21:07

## 2019-12-21 RX ADMIN — GABAPENTIN 100 MILLIGRAM(S): 400 CAPSULE ORAL at 05:22

## 2019-12-21 RX ADMIN — Medication 5 MILLIGRAM(S): at 05:22

## 2019-12-21 RX ADMIN — Medication 3 MILLIGRAM(S): at 21:08

## 2019-12-21 RX ADMIN — Medication 3 MILLILITER(S): at 14:25

## 2019-12-21 RX ADMIN — MEROPENEM 100 MILLIGRAM(S): 1 INJECTION INTRAVENOUS at 14:22

## 2019-12-21 RX ADMIN — OXYCODONE HYDROCHLORIDE 5 MILLIGRAM(S): 5 TABLET ORAL at 06:15

## 2019-12-21 RX ADMIN — OXYCODONE HYDROCHLORIDE 5 MILLIGRAM(S): 5 TABLET ORAL at 23:21

## 2019-12-21 RX ADMIN — Medication 2 MILLIGRAM(S): at 17:06

## 2019-12-21 RX ADMIN — Medication 2 MILLIGRAM(S): at 05:22

## 2019-12-21 RX ADMIN — OLANZAPINE 10 MILLIGRAM(S): 15 TABLET, FILM COATED ORAL at 21:08

## 2019-12-21 RX ADMIN — Medication 1 MILLIGRAM(S): at 11:04

## 2019-12-21 RX ADMIN — LIDOCAINE 1 PATCH: 4 CREAM TOPICAL at 19:06

## 2019-12-21 RX ADMIN — DIVALPROEX SODIUM 250 MILLIGRAM(S): 500 TABLET, DELAYED RELEASE ORAL at 09:27

## 2019-12-21 RX ADMIN — MONTELUKAST 10 MILLIGRAM(S): 4 TABLET, CHEWABLE ORAL at 11:03

## 2019-12-21 RX ADMIN — DIVALPROEX SODIUM 250 MILLIGRAM(S): 500 TABLET, DELAYED RELEASE ORAL at 21:08

## 2019-12-21 RX ADMIN — MEROPENEM 100 MILLIGRAM(S): 1 INJECTION INTRAVENOUS at 05:22

## 2019-12-21 RX ADMIN — Medication 125 MICROGRAM(S): at 05:22

## 2019-12-21 RX ADMIN — PANTOPRAZOLE SODIUM 40 MILLIGRAM(S): 20 TABLET, DELAYED RELEASE ORAL at 09:27

## 2019-12-21 RX ADMIN — GABAPENTIN 100 MILLIGRAM(S): 400 CAPSULE ORAL at 21:07

## 2019-12-21 RX ADMIN — Medication 0.5 MILLIGRAM(S): at 21:05

## 2019-12-21 RX ADMIN — Medication 5 MILLIGRAM(S): at 14:25

## 2019-12-21 RX ADMIN — OXYCODONE HYDROCHLORIDE 5 MILLIGRAM(S): 5 TABLET ORAL at 17:30

## 2019-12-21 RX ADMIN — LIDOCAINE 1 PATCH: 4 CREAM TOPICAL at 11:03

## 2019-12-21 RX ADMIN — CLOPIDOGREL BISULFATE 75 MILLIGRAM(S): 75 TABLET, FILM COATED ORAL at 11:11

## 2019-12-21 RX ADMIN — LOSARTAN POTASSIUM 100 MILLIGRAM(S): 100 TABLET, FILM COATED ORAL at 05:22

## 2019-12-21 RX ADMIN — OXYCODONE HYDROCHLORIDE 5 MILLIGRAM(S): 5 TABLET ORAL at 17:04

## 2019-12-21 RX ADMIN — OXYCODONE HYDROCHLORIDE 5 MILLIGRAM(S): 5 TABLET ORAL at 23:53

## 2019-12-21 RX ADMIN — Medication 0.5 MILLIGRAM(S): at 10:29

## 2019-12-21 RX ADMIN — Medication 2000 UNIT(S): at 11:03

## 2019-12-21 RX ADMIN — PREGABALIN 1000 MICROGRAM(S): 225 CAPSULE ORAL at 11:02

## 2019-12-21 NOTE — PROGRESS NOTE ADULT - ASSESSMENT
_________________________________________________________________________________________  ========>>  M E D I C A L   A T T E N D I N G    F O L L O W  U P  N O T E  <<=========  -----------------------------------------------------------------------------------------------------    - Patient seen and examined by me earlier today.   - In summary,  SHEKHAR VASQUEZ is a 69y year old woman who originally presented with leg infection   - Patient today overall doing ok, comfortable, eating OK.     no other events otherwise     ==================>> REVIEW OF SYSTEM <<=================    GEN: no fever, no chills, + mild pain in legs   RESP: no SOB now, no cough, no sputum  CVS: no chest pain now, no palpitations, + edema, unchanged   GI: no abdominal pain, no nausea  : no dysuria, no frequency, no hematuria  Neuro: no headache, no dizziness  Derm : no itching, no rash    ==================>> PHYSICAL EXAM <<=================    GEN: A&O X 3 , NAD , comfortable  HEENT: NCAT, PERRL, MMM, hearing intact  Neck: supple , no JVD  CVS: S1S2 , regular , No M/R/G appreciated  PULM: CTA B/L,  no W/R/R appreciated  ABD.: soft. non tender, non distended,  bowel sounds present  Extrem: intact pulses , + Bilateral LE edema stable        wound on left leg dressed         scattered ecchymotic areas throughout the body   PSYCH : +  anxious      ==================>> MEDICATIONS <<====================    ALBUTerol    90 MICROgram(s) HFA Inhaler 1 Puff(s) Inhalation every 4 hours  amLODIPine   Tablet 2.5 milliGRAM(s) Oral daily  atorvastatin 40 milliGRAM(s) Oral at bedtime  baclofen 5 milliGRAM(s) Oral three times a day  cholecalciferol 2000 Unit(s) Oral daily  clopidogrel Tablet 75 milliGRAM(s) Oral daily  cyanocobalamin 1000 MICROGram(s) Oral daily  diVALproex  milliGRAM(s) Oral two times a day  enoxaparin Injectable 40 milliGRAM(s) SubCutaneous daily  fluticasone propionate 50 MICROgram(s)/spray Nasal Spray 1 Spray(s) Both Nostrils two times a day  folic acid 1 milliGRAM(s) Oral daily  gabapentin 100 milliGRAM(s) Oral three times a day  levothyroxine 125 MICROGram(s) Oral daily  lidocaine   Patch 1 Patch Transdermal daily  losartan 100 milliGRAM(s) Oral daily  melatonin 3 milliGRAM(s) Oral at bedtime  meropenem  IVPB 1000 milliGRAM(s) IV Intermittent every 8 hours  montelukast 10 milliGRAM(s) Oral daily  OLANZapine 10 milliGRAM(s) Oral at bedtime  pantoprazole    Tablet 40 milliGRAM(s) Oral before breakfast  tiotropium 18 MICROgram(s) Capsule 1 Capsule(s) Inhalation daily  trihexyphenidyl 2 milliGRAM(s) Oral two times a day    MEDICATIONS  (PRN):  acetaminophen   Tablet .. 650 milliGRAM(s) Oral every 6 hours PRN Temp greater or equal to 38C (100.4F)  albuterol/ipratropium for Nebulization 3 milliLiter(s) Nebulizer every 6 hours PRN Shortness of Breath and/or Wheezing  LORazepam     Tablet 0.5 milliGRAM(s) Oral every 4 hours PRN anxiety or agitation  oxyCODONE    IR 5 milliGRAM(s) Oral every 6 hours PRN moderate or severe pain    ==================>> VITAL SIGNS <<==================    Vital Signs Last 24 Hrs  T(C): 36.8 (12-21-19 @ 04:32)  T(F): 98.3 (12-21-19 @ 04:32), Max: 98.3 (12-21-19 @ 04:32)  HR: 75 (12-21-19 @ 04:32) (75 - 92)  BP: 152/72 (12-21-19 @ 04:32)  RR: 18 (12-21-19 @ 04:32) (18 - 18)  SpO2: 99% (12-21-19 @ 04:32) (95% - 99%)       ==================>> LAB AND IMAGING <<==================                        9.4    9.74  )-----------( 97       ( 20 Dec 2019 06:29 )             30.9        139  |  103  |  9   ----------------------------<  132<H>  3.5   |  25  |  0.55    Ca    9.2      20 Dec 2019 06:29    TPro  5.9<L>  /  Alb  2.8<L>  /  TBili  0.7  /  DBili  x   /  AST  9<L>  /  ALT  16  /  AlkPhos  54  12-20    WBC count:   9.74 <<== ,  10.49 <<== ,  15.46 <<==   Hemoglobin:   9.4 <<==,  9.9 <<==,  11.3 <<==  platelets:  97 <==, 95 <==, 124 <==, 131 <==    Creatinine:  0.55  <<==, 0.57  <<==, 0.47  <<==, 0.66  <<==, 0.63  <<==  Sodium:   139  <==, 143  <==, 141  <==, 141  <==, 144  <==       AST:          9 <== , 9 <==      ALT:        16  <== , 27  <==      AP:        54  <=, 59  <=     Bili:        0.7  <=, 0.9  <=    _______________________  C U L T U R E S :    Culture - Urine (collected 15 Dec 2019 03:30)  Source: .Urine Catheterized  Final Report (17 Dec 2019 08:39):    50,000 - 99,000 CFU/mL Escherichia coli ESBL    <10,000 CFU/ml Normal Urogenital love present  Organism: Escherichia coli ESBL (17 Dec 2019 08:39)  Organism: Escherichia coli ESBL (17 Dec 2019 08:39)    Sensitivities:      -  Amikacin: S <=16      -  Ampicillin: R >16 These ampicillin results predict results for amoxicillin      -  Ampicillin/Sulbactam: R 16/8 Enterobacter, Citrobacter, and Serratia may develop resistance during prolonged therapy (3-4 days)      -  Aztreonam: R 16      -  Cefazolin: R >16 ([id number]) For uncomplicated UTI with K. pneumoniae, E. coli, or P. mirablis: MARIA M <=16 is sensitive and MARIA M >=32 is resistant. This also predicts results for oral agents cefaclor, cefdinir, cefpodoxime, cefprozil, cefuroxime axetil, cephalexin and locarbef for uncomplicated UTI. Note that some isolates may be susceptible to these agents while testing resistant to cefazolin.      -  Cefepime: R >16      -  Cefoxitin: S <=8      -  Ceftriaxone: R >32 Enterobacter, Citrobacter, and Serratia may develop resistance during prolonged therapy      -  Ciprofloxacin: R >2      -  Ertapenem: S <=1      -  Gentamicin: S <=4      -  Imipenem: S <=1      -  Levofloxacin: R >4      -  Meropenem: S <=1      -  Nitrofurantoin: S <=32 Should not be used to treat pyelonephritis      -  Piperacillin/Tazobactam: R <=16      -  Tigecycline: S <=2      -  Tobramycin: S <=4      -  Trimethoprim/Sulfamethoxazole: R >2/38      Method Type: MARIA M    Culture - Blood (collected 15 Dec 2019 03:28)  Source: .Blood Blood-Peripheral  Preliminary Report (16 Dec 2019 04:01):    No growth to date.    Culture - Blood (collected 15 Dec 2019 03:28)  Source: .Blood Blood  Preliminary Report (16 Dec 2019 04:01):    No growth to date.    ___________________________________________________________________________________  ===============>>  A S S E S S M E N T   A N D   P L A N <<===============  ------------------------------------------------------------------------------------------    · Assessment		  69 F PMH  Anxiety, HTN, HLD , NOEMY, asthma, COPD, DVT no longer on a/c , remote Breast Cancer, Kidney cancer  in remission , Graves disease, Hypothyroid , S/P laminectomy , carpal tunnel syndrome, Obesity, multiple admissions for sob in past attributed to anxiety/panic attacks, now p/w b/l LE edema and pain.      Problem/Plan - 1:  ·  Problem: Cellulitis of lower extremity / infected left leg wound   --ID eval and mgmt appreciated   -continue IV abx   no DVT on duplex  seen   local wound care    treat edema with diuretics   leg elevation reiterated     Problem/Plan - 2:  ·  Problem:  Bacteriuria  now post fever  -ID appreciated   -cultures as above   - abx upgraded given fever now more stable      to finish abx early next week     Problem/Plan - 3:  ·  Problem: Mood disorder.  Plan: -Pt with complex anxiety spectrum disorder, paranoid delusion disorder with mood issues as noted on prior extensive behavioral health eval from admission 11/2019.  - psych following  - Continue Current medications and monitor.   - pt may need inpatient psych transfer     Problem/Plan - 4:  ·  Problem: Other chronic pain.  Plan: -c/w baclofen  - Continue Current medications and monitor.     Problem/Plan - 5:  ·  Problem: Debility  - PT / OOB as able   -fall precautions     Problem/Plan - 6:  Problem: Essential hypertension.  -c/w home losartan, norvasc.  - cardio following     Problem/Plan - 7:  ·  Problem: Prophylactic measure.  Plan: -lovenox vte ppx.    keep legs elevated     --------------------------------------------  Case discussed with pt  Education given on findings and plan of care  ___________________________  H. JANICE Welch.  Pager: [phone]

## 2019-12-21 NOTE — PROGRESS NOTE ADULT - SUBJECTIVE AND OBJECTIVE BOX
Cardiovascular Disease Progress Note    Overnight events: No acute events overnight.  still getting sob at times. overall feels better  Otherwise review of systems negative    Objective Findings:  T(C): 36.8 (12-21-19 @ 04:32), Max: 36.8 (12-21-19 @ 04:32)  HR: 75 (12-21-19 @ 04:32) (75 - 92)  BP: 152/72 (12-21-19 @ 04:32) (125/54 - 152/72)  RR: 18 (12-21-19 @ 04:32) (18 - 18)  SpO2: 99% (12-21-19 @ 04:32) (95% - 99%)  Wt(kg): --  Daily     Daily       Physical Exam:  Gen: NAD  HEENT: EOMI  CV: RRR, normal S1 + S2, no m/r/g  Lungs: CTAB  Abd: soft, non-tender  Ext: No edema      Laboratory Data:                        9.4    9.74  )-----------( 97       ( 20 Dec 2019 06:29 )             30.9     12-20    139  |  103  |  9   ----------------------------<  132<H>  3.5   |  25  |  0.55    Ca    9.2      20 Dec 2019 06:29    TPro  5.9<L>  /  Alb  2.8<L>  /  TBili  0.7  /  DBili  x   /  AST  9<L>  /  ALT  16  /  AlkPhos  54  12-20              Inpatient Medications:  MEDICATIONS  (STANDING):  ALBUTerol    90 MICROgram(s) HFA Inhaler 1 Puff(s) Inhalation every 4 hours  amLODIPine   Tablet 2.5 milliGRAM(s) Oral daily  atorvastatin 40 milliGRAM(s) Oral at bedtime  baclofen 5 milliGRAM(s) Oral three times a day  cholecalciferol 2000 Unit(s) Oral daily  clopidogrel Tablet 75 milliGRAM(s) Oral daily  cyanocobalamin 1000 MICROGram(s) Oral daily  diVALproex  milliGRAM(s) Oral two times a day  enoxaparin Injectable 40 milliGRAM(s) SubCutaneous daily  fluticasone propionate 50 MICROgram(s)/spray Nasal Spray 1 Spray(s) Both Nostrils two times a day  folic acid 1 milliGRAM(s) Oral daily  gabapentin 100 milliGRAM(s) Oral three times a day  levothyroxine 125 MICROGram(s) Oral daily  lidocaine   Patch 1 Patch Transdermal daily  losartan 100 milliGRAM(s) Oral daily  melatonin 3 milliGRAM(s) Oral at bedtime  meropenem  IVPB 1000 milliGRAM(s) IV Intermittent every 8 hours  montelukast 10 milliGRAM(s) Oral daily  OLANZapine 10 milliGRAM(s) Oral at bedtime  pantoprazole    Tablet 40 milliGRAM(s) Oral before breakfast  tiotropium 18 MICROgram(s) Capsule 1 Capsule(s) Inhalation daily  trihexyphenidyl 2 milliGRAM(s) Oral two times a day      Assessment:  -sob  -le cellulitis  -atypical cp s/p recent LHC with normal cors  -venous insufficiency  -copd/asthma  -obesity  -melba  -severe anxiety  -hypothyroidism    Recs:  -c/w home antihypertensives and diuretics. appears euvolemic  -c/w bronchodilators  -psychiatry recs appreciated. dosing of anti-psychotics per psych. would probably benefit from inpatient psychiatry if agreeable due to her multiple hospitalizations and inability to care for herself in setting of delusional disorder etc  -keep legs elevated, compression stockings and c/w diuretic. unclear why now on plavix. would attempt to clarify  -f/u ID re esbl in urine, gpc bcx. on merrem. awaiting ct abd/pelvis (currently refusing)  -DVT ppx      Over 25 minutes spent on total encounter; more than 50% of the visit was spent counseling and/or coordinating care by the attending physician.      Juan Salcedo MD   Cardiovascular Disease  [phone]

## 2019-12-21 NOTE — PROGRESS NOTE ADULT - SUBJECTIVE AND OBJECTIVE BOX
CC: f/u for leukocytosis and fever    Patient reports nothing that makes any sense    REVIEW OF SYSTEMS:  All other review of systems negative (Comprehensive ROS)    Antimicrobials Day #  :5/7  meropenem  IVPB 1000 milliGRAM(s) IV Intermittent every 8 hours    Other Medications Reviewed    T(F): 99.1 (12-21-19 @ 12:55), Max: 99.1 (12-21-19 @ 12:55)  HR: 103 (12-21-19 @ 12:55)  BP: 129/62 (12-21-19 @ 12:55)  RR: 18 (12-21-19 @ 12:55)  SpO2: 96% (12-21-19 @ 12:55)  Wt(kg): --    PHYSICAL EXAM:  General: alert, shakey, sucking on nebulizer  Eyes:  anicteric, no conjunctival injection, no discharge  Oropharynx: no lesions or injection 	  Neck: supple, without adenopathy  Lungs: clear to auscultation  Heart: regular rate and rhythm; no murmur, rubs or gallops  Abdomen: soft, nondistended, nontender, without mass or organomegaly  Skin: no lesions  Extremities: no clubbing, cyanosis,. legs with edema  Neurologic: alert,  moves all extremities    LAB RESULTS:                        9.4    9.74  )-----------( 97       ( 20 Dec 2019 06:29 )             30.9     12-20    139  |  103  |  9   ----------------------------<  132<H>  3.5   |  25  |  0.55    Ca    9.2      20 Dec 2019 06:29    TPro  5.9<L>  /  Alb  2.8<L>  /  TBili  0.7  /  DBili  x   /  AST  9<L>  /  ALT  16  /  AlkPhos  54  12-20    LIVER FUNCTIONS - ( 20 Dec 2019 06:29 )  Alb: 2.8 g/dL / Pro: 5.9 g/dL / ALK PHOS: 54 U/L / ALT: 16 U/L / AST: 9 U/L / GGT: x             MICROBIOLOGY:  RECENT CULTURES:  12-17 @ 21:25 .Blood Blood     Growth in aerobic bottle: Bacillus species not anthracis  "Susceptibilities not performed"    Growth in aerobic bottle: Gram Positive Rods        RADIOLOGY REVIEWED:    < from: [facility] Duplex Lower Ext Vein Scan, Bilat (12.16.19 @ 09:38) >    IMPRESSION:     No evidence of deep venous thrombosis in either lower extremity above the   knee.    < end of copied text >            Assessment:  Patient with anxiety, psychiatric d/o, asthma, venous stasis admitted when she was taking her ativan and pain meds too much with errtive behavior, had leukocytosis and ulcer left leg c/w possible cellulitis with venous stasis, had a fever, urine grew esbl ecoli now no fever and normal ebc on meropenem  Plan:  2 more day of meropenem

## 2019-12-22 LAB
CULTURE RESULTS: SIGNIFICANT CHANGE UP
HCT VFR BLD CALC: 33.3 % — LOW (ref 34.5–45)
HGB BLD-MCNC: 10 G/DL — LOW (ref 11.5–15.5)
MCHC RBC-ENTMCNC: 27.9 PG — SIGNIFICANT CHANGE UP (ref 27–34)
MCHC RBC-ENTMCNC: 30 GM/DL — LOW (ref 32–36)
MCV RBC AUTO: 92.8 FL — SIGNIFICANT CHANGE UP (ref 80–100)
PLATELET # BLD AUTO: 105 K/UL — LOW (ref 150–400)
RBC # BLD: 3.59 M/UL — LOW (ref 3.8–5.2)
RBC # FLD: 16.3 % — HIGH (ref 10.3–14.5)
SPECIMEN SOURCE: SIGNIFICANT CHANGE UP
WBC # BLD: 9.65 K/UL — SIGNIFICANT CHANGE UP (ref 3.8–10.5)
WBC # FLD AUTO: 9.65 K/UL — SIGNIFICANT CHANGE UP (ref 3.8–10.5)

## 2019-12-22 RX ORDER — OXYCODONE HYDROCHLORIDE 5 MG/1
5 TABLET ORAL EVERY 6 HOURS
Refills: 0 | Status: DISCONTINUED | OUTPATIENT
Start: 2019-12-22 | End: 2019-12-26

## 2019-12-22 RX ADMIN — OXYCODONE HYDROCHLORIDE 5 MILLIGRAM(S): 5 TABLET ORAL at 12:00

## 2019-12-22 RX ADMIN — Medication 5 MILLIGRAM(S): at 13:14

## 2019-12-22 RX ADMIN — CLOPIDOGREL BISULFATE 75 MILLIGRAM(S): 75 TABLET, FILM COATED ORAL at 11:37

## 2019-12-22 RX ADMIN — LIDOCAINE 1 PATCH: 4 CREAM TOPICAL at 11:35

## 2019-12-22 RX ADMIN — DIVALPROEX SODIUM 250 MILLIGRAM(S): 500 TABLET, DELAYED RELEASE ORAL at 23:06

## 2019-12-22 RX ADMIN — AMLODIPINE BESYLATE 2.5 MILLIGRAM(S): 2.5 TABLET ORAL at 05:28

## 2019-12-22 RX ADMIN — GABAPENTIN 100 MILLIGRAM(S): 400 CAPSULE ORAL at 21:57

## 2019-12-22 RX ADMIN — Medication 2 MILLIGRAM(S): at 05:28

## 2019-12-22 RX ADMIN — Medication 5 MILLIGRAM(S): at 05:27

## 2019-12-22 RX ADMIN — ENOXAPARIN SODIUM 40 MILLIGRAM(S): 100 INJECTION SUBCUTANEOUS at 11:37

## 2019-12-22 RX ADMIN — Medication 0.5 MILLIGRAM(S): at 09:04

## 2019-12-22 RX ADMIN — Medication 0.5 MILLIGRAM(S): at 14:42

## 2019-12-22 RX ADMIN — LIDOCAINE 1 PATCH: 4 CREAM TOPICAL at 23:06

## 2019-12-22 RX ADMIN — LOSARTAN POTASSIUM 100 MILLIGRAM(S): 100 TABLET, FILM COATED ORAL at 05:28

## 2019-12-22 RX ADMIN — Medication 3 MILLIGRAM(S): at 21:58

## 2019-12-22 RX ADMIN — DIVALPROEX SODIUM 250 MILLIGRAM(S): 500 TABLET, DELAYED RELEASE ORAL at 09:05

## 2019-12-22 RX ADMIN — GABAPENTIN 100 MILLIGRAM(S): 400 CAPSULE ORAL at 05:28

## 2019-12-22 RX ADMIN — GABAPENTIN 100 MILLIGRAM(S): 400 CAPSULE ORAL at 13:14

## 2019-12-22 RX ADMIN — Medication 0.5 MILLIGRAM(S): at 21:57

## 2019-12-22 RX ADMIN — ATORVASTATIN CALCIUM 40 MILLIGRAM(S): 80 TABLET, FILM COATED ORAL at 21:57

## 2019-12-22 RX ADMIN — Medication 1 SPRAY(S): at 05:28

## 2019-12-22 RX ADMIN — LIDOCAINE 1 PATCH: 4 CREAM TOPICAL at 20:00

## 2019-12-22 RX ADMIN — Medication 0.5 MILLIGRAM(S): at 05:27

## 2019-12-22 RX ADMIN — Medication 5 MILLIGRAM(S): at 21:58

## 2019-12-22 RX ADMIN — Medication 2 MILLIGRAM(S): at 17:45

## 2019-12-22 RX ADMIN — OXYCODONE HYDROCHLORIDE 5 MILLIGRAM(S): 5 TABLET ORAL at 10:52

## 2019-12-22 RX ADMIN — PREGABALIN 1000 MICROGRAM(S): 225 CAPSULE ORAL at 11:38

## 2019-12-22 RX ADMIN — Medication 2000 UNIT(S): at 11:37

## 2019-12-22 RX ADMIN — Medication 125 MICROGRAM(S): at 05:28

## 2019-12-22 RX ADMIN — MONTELUKAST 10 MILLIGRAM(S): 4 TABLET, CHEWABLE ORAL at 11:37

## 2019-12-22 RX ADMIN — MEROPENEM 100 MILLIGRAM(S): 1 INJECTION INTRAVENOUS at 13:14

## 2019-12-22 RX ADMIN — MEROPENEM 100 MILLIGRAM(S): 1 INJECTION INTRAVENOUS at 21:59

## 2019-12-22 RX ADMIN — Medication 3 MILLILITER(S): at 21:56

## 2019-12-22 RX ADMIN — Medication 1 SPRAY(S): at 17:45

## 2019-12-22 RX ADMIN — OXYCODONE HYDROCHLORIDE 5 MILLIGRAM(S): 5 TABLET ORAL at 16:41

## 2019-12-22 RX ADMIN — MEROPENEM 100 MILLIGRAM(S): 1 INJECTION INTRAVENOUS at 05:28

## 2019-12-22 RX ADMIN — Medication 1 MILLIGRAM(S): at 11:36

## 2019-12-22 RX ADMIN — OLANZAPINE 10 MILLIGRAM(S): 15 TABLET, FILM COATED ORAL at 21:57

## 2019-12-22 NOTE — PROGRESS NOTE ADULT - ASSESSMENT
_________________________________________________________________________________________  ========>>  M E D I C A L   A T T E N D I N G    F O L L O W  U P  N O T E  <<=========  -----------------------------------------------------------------------------------------------------    - Patient seen and examined by me earlier today.   - In summary,  SHEKHAR VASQUEZ is a 69y year old woman who originally presented with leg infection   - Patient today overall doing ok, comfortable, eating OK.     no other events otherwise     ==================>> REVIEW OF SYSTEM <<=================    GEN: no fever, no chills, + mild pain in legs   RESP: reports occasional SOB now, no cough, no sputum  CVS: no chest pain now, no palpitations, + edema, unchanged   GI: no abdominal pain, no nausea  : no dysuria, no frequency, no hematuria  Neuro: no headache, no dizziness  Derm : no itching, no rash    ==================>> PHYSICAL EXAM <<=================    GEN: A&O X 3 , NAD , comfortable in recliner with legs down   HEENT: NCAT, PERRL, MMM, hearing intact  Neck: supple , no JVD  CVS: S1S2 , regular , No M/R/G appreciated  PULM: CTA B/L,  no W/R/R appreciated  ABD.: soft. non tender, non distended,  bowel sounds present  Extrem: intact pulses , + Bilateral LE edema stable        wound on left leg dressed         scattered ecchymotic areas throughout the body   PSYCH : +  anxious      ==================>> MEDICATIONS <<====================    ALBUTerol    90 MICROgram(s) HFA Inhaler 1 Puff(s) Inhalation every 4 hours  amLODIPine   Tablet 2.5 milliGRAM(s) Oral daily  atorvastatin 40 milliGRAM(s) Oral at bedtime  baclofen 5 milliGRAM(s) Oral three times a day  cholecalciferol 2000 Unit(s) Oral daily  clopidogrel Tablet 75 milliGRAM(s) Oral daily  cyanocobalamin 1000 MICROGram(s) Oral daily  diVALproex  milliGRAM(s) Oral two times a day  enoxaparin Injectable 40 milliGRAM(s) SubCutaneous daily  fluticasone propionate 50 MICROgram(s)/spray Nasal Spray 1 Spray(s) Both Nostrils two times a day  folic acid 1 milliGRAM(s) Oral daily  gabapentin 100 milliGRAM(s) Oral three times a day  levothyroxine 125 MICROGram(s) Oral daily  lidocaine   Patch 1 Patch Transdermal daily  losartan 100 milliGRAM(s) Oral daily  melatonin 3 milliGRAM(s) Oral at bedtime  meropenem  IVPB 1000 milliGRAM(s) IV Intermittent every 8 hours  montelukast 10 milliGRAM(s) Oral daily  OLANZapine 10 milliGRAM(s) Oral at bedtime  pantoprazole    Tablet 40 milliGRAM(s) Oral before breakfast  tiotropium 18 MICROgram(s) Capsule 1 Capsule(s) Inhalation daily  trihexyphenidyl 2 milliGRAM(s) Oral two times a day    MEDICATIONS  (PRN):  acetaminophen   Tablet .. 650 milliGRAM(s) Oral every 6 hours PRN Temp greater or equal to 38C (100.4F)  albuterol/ipratropium for Nebulization 3 milliLiter(s) Nebulizer every 6 hours PRN Shortness of Breath and/or Wheezing  LORazepam     Tablet 0.5 milliGRAM(s) Oral every 4 hours PRN anxiety or agitation  oxyCODONE    IR 5 milliGRAM(s) Oral every 6 hours PRN moderate or severe pain    ==================>> VITAL SIGNS <<==================    Vital Signs Last 24 Hrs  T(C): 37.2 (12-22-19 @ 12:34)  T(F): 98.9 (12-22-19 @ 12:34), Max: 100.2 (12-21-19 @ 20:46)  HR: 100 (12-22-19 @ 12:34) (61 - 100)  BP: 156/58 (12-22-19 @ 12:34)  RR: 18 (12-22-19 @ 12:34) (18 - 18)  SpO2: 95% (12-22-19 @ 12:34) (95% - 99%)       ==================>> LAB AND IMAGING <<==================                        10.0   9.65  )-----------( 105      ( 22 Dec 2019 09:12 )             33.3        WBC count:   9.65 <<== ,  9.74 <<== ,  10.49 <<==   Hemoglobin:   10.0 <<==,  9.4 <<==,  9.9 <<==  platelets:  105 <==, 97 <==, 95 <==, 124 <==    Creatinine:  0.55  <<==, 0.57  <<==, 0.47  <<==, 0.66  <<==  Sodium:   139  <==, 143  <==, 141  <==, 141  <==    ___________________________________________________________________________________  ===============>>  A S S E S S M E N T   A N D   P L A N <<===============  ------------------------------------------------------------------------------------------    · Assessment		  69 F PMH  Anxiety, HTN, HLD , NOEMY, asthma, COPD, DVT no longer on a/c , remote Breast Cancer, Kidney cancer  in remission , Graves disease, Hypothyroid , S/P laminectomy , carpal tunnel syndrome, Obesity, multiple admissions for sob in past attributed to anxiety/panic attacks, now p/w b/l LE edema and pain.      Problem/Plan - 1:  ·  Problem: Cellulitis of lower extremity / infected left leg wound   --ID eval and mgmt appreciated   -continue IV abx   no DVT on duplex  seen   local wound care    treat edema with diuretics   leg elevation reiterated     Problem/Plan - 2:  ·  Problem:  Bacteriuria  now post fever  -ID appreciated   -cultures as above   - abx upgraded given fever now more stable      to finish abx early next week     Problem/Plan - 3:  ·  Problem: Mood disorder.  Plan: -Pt with complex anxiety spectrum disorder, paranoid delusion disorder with mood issues as noted on prior extensive behavioral health eval from admission 11/2019.  - psych following  - Continue Current medications and monitor.   - pt may need inpatient psych transfer     psych follow up and further recom     Problem/Plan - 4:  ·  Problem: Other chronic pain.  Plan: -c/w baclofen  - Continue Current medications and monitor.     Problem/Plan - 5:  ·  Problem: Debility  - PT / OOB as able   -fall precautions     Problem/Plan - 6:  Problem: Essential hypertension.  -c/w home losartan, norvasc.  - cardio following     Problem/Plan - 7:  ·  Problem: Prophylactic measure.  Plan: -lovenox vte ppx.    keep legs elevated     --------------------------------------------  Case discussed with pt  Education given on findings and plan of care  ___________________________  H. JANICE Welch.  Pager: [phone]

## 2019-12-22 NOTE — PROGRESS NOTE ADULT - SUBJECTIVE AND OBJECTIVE BOX
Cardiovascular Disease Progress Note    Overnight events: No acute events overnight.  is concerned about her family at home. seems to be having hallucinatory thoughts. sob better today  Otherwise review of systems negative    Objective Findings:  T(C): 36.7 (12-22-19 @ 04:22), Max: 37.9 (12-21-19 @ 20:46)  HR: 61 (12-22-19 @ 04:22) (61 - 103)  BP: 137/69 (12-22-19 @ 04:22) (129/62 - 170/66)  RR: 18 (12-22-19 @ 04:22) (18 - 18)  SpO2: 99% (12-22-19 @ 04:22) (96% - 99%)  Wt(kg): --  Daily     Daily       Physical Exam:  Gen: NAD  HEENT: EOMI  CV: RRR, normal S1 + S2, no m/r/g  Lungs: CTAB  Abd: soft, non-tender  Ext: No edema      Laboratory Data:                        10.0   9.65  )-----------( 105      ( 22 Dec 2019 09:12 )             33.3                     Inpatient Medications:  MEDICATIONS  (STANDING):  ALBUTerol    90 MICROgram(s) HFA Inhaler 1 Puff(s) Inhalation every 4 hours  amLODIPine   Tablet 2.5 milliGRAM(s) Oral daily  atorvastatin 40 milliGRAM(s) Oral at bedtime  baclofen 5 milliGRAM(s) Oral three times a day  cholecalciferol 2000 Unit(s) Oral daily  clopidogrel Tablet 75 milliGRAM(s) Oral daily  cyanocobalamin 1000 MICROGram(s) Oral daily  diVALproex  milliGRAM(s) Oral two times a day  enoxaparin Injectable 40 milliGRAM(s) SubCutaneous daily  fluticasone propionate 50 MICROgram(s)/spray Nasal Spray 1 Spray(s) Both Nostrils two times a day  folic acid 1 milliGRAM(s) Oral daily  gabapentin 100 milliGRAM(s) Oral three times a day  levothyroxine 125 MICROGram(s) Oral daily  lidocaine   Patch 1 Patch Transdermal daily  losartan 100 milliGRAM(s) Oral daily  melatonin 3 milliGRAM(s) Oral at bedtime  meropenem  IVPB 1000 milliGRAM(s) IV Intermittent every 8 hours  montelukast 10 milliGRAM(s) Oral daily  OLANZapine 10 milliGRAM(s) Oral at bedtime  pantoprazole    Tablet 40 milliGRAM(s) Oral before breakfast  tiotropium 18 MICROgram(s) Capsule 1 Capsule(s) Inhalation daily  trihexyphenidyl 2 milliGRAM(s) Oral two times a day      Assessment:  -sob  -le cellulitis  -atypical cp s/p recent LHC with normal cors  -venous insufficiency  -copd/asthma  -obesity  -melba  -severe anxiety  -hypothyroidism    Recs:  -c/w home antihypertensives and diuretics. appears euvolemic  -c/w bronchodilators  -psychiatry recs appreciated. dosing of anti-psychotics per psych. would probably benefit from inpatient psychiatry if agreeable due to her multiple hospitalizations and inability to care for herself in setting of delusional disorder etc. suspect she will not follow up with psych as an outpatient  -keep legs elevated, compression stockings and c/w diuretic. unclear why now on plavix. would attempt to clarify  -f/u ID re esbl in urine, gpc bcx. on merrem. awaiting ct abd/pelvis (currently refusing)  -DVT ppx      Over 25 minutes spent on total encounter; more than 50% of the visit was spent counseling and/or coordinating care by the attending physician.      Juan Salcedo MD   Cardiovascular Disease  [phone]

## 2019-12-23 ENCOUNTER — TRANSCRIPTION ENCOUNTER (OUTPATIENT)
Age: 69
End: 2019-12-23

## 2019-12-23 LAB
ANION GAP SERPL CALC-SCNC: 10 MMOL/L — SIGNIFICANT CHANGE UP (ref 5–17)
BASOPHILS # BLD AUTO: 0.02 K/UL — SIGNIFICANT CHANGE UP (ref 0–0.2)
BASOPHILS NFR BLD AUTO: 0.2 % — SIGNIFICANT CHANGE UP (ref 0–2)
BUN SERPL-MCNC: 11 MG/DL — SIGNIFICANT CHANGE UP (ref 7–23)
CALCIUM SERPL-MCNC: 9.5 MG/DL — SIGNIFICANT CHANGE UP (ref 8.4–10.5)
CHLORIDE SERPL-SCNC: 103 MMOL/L — SIGNIFICANT CHANGE UP (ref 96–108)
CO2 SERPL-SCNC: 26 MMOL/L — SIGNIFICANT CHANGE UP (ref 22–31)
CREAT SERPL-MCNC: 0.51 MG/DL — SIGNIFICANT CHANGE UP (ref 0.5–1.3)
EOSINOPHIL # BLD AUTO: 0.27 K/UL — SIGNIFICANT CHANGE UP (ref 0–0.5)
EOSINOPHIL NFR BLD AUTO: 3.1 % — SIGNIFICANT CHANGE UP (ref 0–6)
GLUCOSE SERPL-MCNC: 95 MG/DL — SIGNIFICANT CHANGE UP (ref 70–99)
HCT VFR BLD CALC: 29.6 % — LOW (ref 34.5–45)
HGB BLD-MCNC: 8.9 G/DL — LOW (ref 11.5–15.5)
IMM GRANULOCYTES NFR BLD AUTO: 0.5 % — SIGNIFICANT CHANGE UP (ref 0–1.5)
LYMPHOCYTES # BLD AUTO: 1.22 K/UL — SIGNIFICANT CHANGE UP (ref 1–3.3)
LYMPHOCYTES # BLD AUTO: 14 % — SIGNIFICANT CHANGE UP (ref 13–44)
MCHC RBC-ENTMCNC: 27.4 PG — SIGNIFICANT CHANGE UP (ref 27–34)
MCHC RBC-ENTMCNC: 30.1 GM/DL — LOW (ref 32–36)
MCV RBC AUTO: 91.1 FL — SIGNIFICANT CHANGE UP (ref 80–100)
MONOCYTES # BLD AUTO: 0.75 K/UL — SIGNIFICANT CHANGE UP (ref 0–0.9)
MONOCYTES NFR BLD AUTO: 8.6 % — SIGNIFICANT CHANGE UP (ref 2–14)
NEUTROPHILS # BLD AUTO: 6.39 K/UL — SIGNIFICANT CHANGE UP (ref 1.8–7.4)
NEUTROPHILS NFR BLD AUTO: 73.6 % — SIGNIFICANT CHANGE UP (ref 43–77)
PLATELET # BLD AUTO: 111 K/UL — LOW (ref 150–400)
POTASSIUM SERPL-MCNC: 3.8 MMOL/L — SIGNIFICANT CHANGE UP (ref 3.5–5.3)
POTASSIUM SERPL-SCNC: 3.8 MMOL/L — SIGNIFICANT CHANGE UP (ref 3.5–5.3)
RBC # BLD: 3.25 M/UL — LOW (ref 3.8–5.2)
RBC # FLD: 15.9 % — HIGH (ref 10.3–14.5)
SODIUM SERPL-SCNC: 139 MMOL/L — SIGNIFICANT CHANGE UP (ref 135–145)
WBC # BLD: 8.69 K/UL — SIGNIFICANT CHANGE UP (ref 3.8–10.5)
WBC # FLD AUTO: 8.69 K/UL — SIGNIFICANT CHANGE UP (ref 3.8–10.5)

## 2019-12-23 PROCEDURE — 99232 SBSQ HOSP IP/OBS MODERATE 35: CPT | Mod: GC

## 2019-12-23 RX ORDER — OLANZAPINE 15 MG/1
12.5 TABLET, FILM COATED ORAL AT BEDTIME
Refills: 0 | Status: DISCONTINUED | OUTPATIENT
Start: 2019-12-23 | End: 2019-12-26

## 2019-12-23 RX ADMIN — OXYCODONE HYDROCHLORIDE 5 MILLIGRAM(S): 5 TABLET ORAL at 18:29

## 2019-12-23 RX ADMIN — Medication 125 MICROGRAM(S): at 06:03

## 2019-12-23 RX ADMIN — MEROPENEM 100 MILLIGRAM(S): 1 INJECTION INTRAVENOUS at 06:04

## 2019-12-23 RX ADMIN — DIVALPROEX SODIUM 250 MILLIGRAM(S): 500 TABLET, DELAYED RELEASE ORAL at 10:16

## 2019-12-23 RX ADMIN — GABAPENTIN 100 MILLIGRAM(S): 400 CAPSULE ORAL at 15:14

## 2019-12-23 RX ADMIN — LIDOCAINE 1 PATCH: 4 CREAM TOPICAL at 11:24

## 2019-12-23 RX ADMIN — AMLODIPINE BESYLATE 2.5 MILLIGRAM(S): 2.5 TABLET ORAL at 06:03

## 2019-12-23 RX ADMIN — Medication 1 SPRAY(S): at 06:02

## 2019-12-23 RX ADMIN — GABAPENTIN 100 MILLIGRAM(S): 400 CAPSULE ORAL at 06:03

## 2019-12-23 RX ADMIN — ATORVASTATIN CALCIUM 40 MILLIGRAM(S): 80 TABLET, FILM COATED ORAL at 22:00

## 2019-12-23 RX ADMIN — GABAPENTIN 100 MILLIGRAM(S): 400 CAPSULE ORAL at 22:00

## 2019-12-23 RX ADMIN — OXYCODONE HYDROCHLORIDE 5 MILLIGRAM(S): 5 TABLET ORAL at 08:18

## 2019-12-23 RX ADMIN — MONTELUKAST 10 MILLIGRAM(S): 4 TABLET, CHEWABLE ORAL at 11:26

## 2019-12-23 RX ADMIN — Medication 1 SPRAY(S): at 17:32

## 2019-12-23 RX ADMIN — Medication 0.5 MILLIGRAM(S): at 06:03

## 2019-12-23 RX ADMIN — LOSARTAN POTASSIUM 100 MILLIGRAM(S): 100 TABLET, FILM COATED ORAL at 06:03

## 2019-12-23 RX ADMIN — LIDOCAINE 1 PATCH: 4 CREAM TOPICAL at 23:05

## 2019-12-23 RX ADMIN — ENOXAPARIN SODIUM 40 MILLIGRAM(S): 100 INJECTION SUBCUTANEOUS at 11:25

## 2019-12-23 RX ADMIN — OXYCODONE HYDROCHLORIDE 5 MILLIGRAM(S): 5 TABLET ORAL at 09:15

## 2019-12-23 RX ADMIN — LIDOCAINE 1 PATCH: 4 CREAM TOPICAL at 19:15

## 2019-12-23 RX ADMIN — Medication 0.5 MILLIGRAM(S): at 15:17

## 2019-12-23 RX ADMIN — PREGABALIN 1000 MICROGRAM(S): 225 CAPSULE ORAL at 11:25

## 2019-12-23 RX ADMIN — Medication 5 MILLIGRAM(S): at 06:03

## 2019-12-23 RX ADMIN — OLANZAPINE 12.5 MILLIGRAM(S): 15 TABLET, FILM COATED ORAL at 22:00

## 2019-12-23 RX ADMIN — MEROPENEM 100 MILLIGRAM(S): 1 INJECTION INTRAVENOUS at 15:17

## 2019-12-23 RX ADMIN — CLOPIDOGREL BISULFATE 75 MILLIGRAM(S): 75 TABLET, FILM COATED ORAL at 11:26

## 2019-12-23 RX ADMIN — Medication 0.5 MILLIGRAM(S): at 10:15

## 2019-12-23 RX ADMIN — Medication 3 MILLIGRAM(S): at 22:01

## 2019-12-23 RX ADMIN — OXYCODONE HYDROCHLORIDE 5 MILLIGRAM(S): 5 TABLET ORAL at 17:31

## 2019-12-23 RX ADMIN — Medication 2 MILLIGRAM(S): at 17:33

## 2019-12-23 RX ADMIN — Medication 1 MILLIGRAM(S): at 11:25

## 2019-12-23 RX ADMIN — Medication 5 MILLIGRAM(S): at 22:00

## 2019-12-23 RX ADMIN — Medication 5 MILLIGRAM(S): at 15:14

## 2019-12-23 RX ADMIN — DIVALPROEX SODIUM 250 MILLIGRAM(S): 500 TABLET, DELAYED RELEASE ORAL at 22:00

## 2019-12-23 RX ADMIN — Medication 2000 UNIT(S): at 11:25

## 2019-12-23 RX ADMIN — Medication 2 MILLIGRAM(S): at 06:03

## 2019-12-23 NOTE — PROGRESS NOTE ADULT - SUBJECTIVE AND OBJECTIVE BOX
Cardiovascular Disease Progress Note    Overnight events: No acute events overnight.  last day of iv abx today. states her asthma isnt well controlled  Otherwise review of systems negative    Objective Findings:  T(C): 36.4 (12-23-19 @ 04:28), Max: 37.2 (12-22-19 @ 12:34)  HR: 76 (12-23-19 @ 04:28) (76 - 100)  BP: 145/63 (12-23-19 @ 04:28) (145/63 - 156/58)  RR: 18 (12-23-19 @ 04:28) (18 - 18)  SpO2: 94% (12-23-19 @ 04:28) (94% - 95%)  Wt(kg): --  Daily     Daily       Physical Exam:  Gen: NAD  HEENT: EOMI  CV: RRR, normal S1 + S2, no m/r/g  Lungs: CTAB  Abd: soft, non-tender  Ext: No edema      Laboratory Data:                        10.0   9.65  )-----------( 105      ( 22 Dec 2019 09:12 )             33.3                     Inpatient Medications:  MEDICATIONS  (STANDING):  ALBUTerol    90 MICROgram(s) HFA Inhaler 1 Puff(s) Inhalation every 4 hours  amLODIPine   Tablet 2.5 milliGRAM(s) Oral daily  atorvastatin 40 milliGRAM(s) Oral at bedtime  baclofen 5 milliGRAM(s) Oral three times a day  cholecalciferol 2000 Unit(s) Oral daily  clopidogrel Tablet 75 milliGRAM(s) Oral daily  cyanocobalamin 1000 MICROGram(s) Oral daily  diVALproex  milliGRAM(s) Oral two times a day  enoxaparin Injectable 40 milliGRAM(s) SubCutaneous daily  fluticasone propionate 50 MICROgram(s)/spray Nasal Spray 1 Spray(s) Both Nostrils two times a day  folic acid 1 milliGRAM(s) Oral daily  gabapentin 100 milliGRAM(s) Oral three times a day  levothyroxine 125 MICROGram(s) Oral daily  lidocaine   Patch 1 Patch Transdermal daily  losartan 100 milliGRAM(s) Oral daily  melatonin 3 milliGRAM(s) Oral at bedtime  meropenem  IVPB 1000 milliGRAM(s) IV Intermittent every 8 hours  montelukast 10 milliGRAM(s) Oral daily  OLANZapine 10 milliGRAM(s) Oral at bedtime  pantoprazole    Tablet 40 milliGRAM(s) Oral before breakfast  tiotropium 18 MICROgram(s) Capsule 1 Capsule(s) Inhalation daily  trihexyphenidyl 2 milliGRAM(s) Oral two times a day      Assessment:  -sob  -le cellulitis  -atypical cp s/p recent LHC with normal cors  -venous insufficiency  -copd/asthma  -obesity  -melba  -severe anxiety  -hypothyroidism    Recs:  -c/w home antihypertensives and diuretics. appears euvolemic  -c/w bronchodilators  -psychiatry recs appreciated. dosing of anti-psychotics per psych. would probably benefit from inpatient psychiatry if agreeable due to her multiple hospitalizations and inability to care for herself in setting of delusional disorder etc. suspect she will not follow up with psych as an outpatient  -keep legs elevated, compression stockings and c/w diuretic. unclear why now on plavix. would attempt to clarify  -f/u ID re esbl in urine, gpc bcx. on merrem. awaiting ct abd/pelvis (currently refusing). last day of abx today per id  -DVT ppx        Over 25 minutes spent on total encounter; more than 50% of the visit was spent counseling and/or coordinating care by the attending physician.      Juan Salcedo MD   Cardiovascular Disease  [phone]

## 2019-12-23 NOTE — PROGRESS NOTE ADULT - SUBJECTIVE AND OBJECTIVE BOX
CC: f/u for  uti  Patient reports her legs hurt any time they are touched    REVIEW OF SYSTEMS:  All other review of systems negative (Comprehensive ROS)    Antimicrobials Day #  :7/7  meropenem  IVPB 1000 milliGRAM(s) IV Intermittent every 8 hours    Other Medications Reviewed    T(F): 98.2 (12-23-19 @ 14:32), Max: 98.2 (12-23-19 @ 14:32)  HR: 108 (12-23-19 @ 14:32)  BP: 114/51 (12-23-19 @ 14:32)  RR: 18 (12-23-19 @ 14:32)  SpO2: 97% (12-23-19 @ 14:32)  Wt(kg): --    PHYSICAL EXAM:  General: alert, no acute distress  Eyes:  anicteric, no conjunctival injection, no discharge  Oropharynx: no lesions or injection 	  Neck: supple, without adenopathy  Lungs: clear to auscultation  Heart: regular rate and rhythm; no murmur, rubs or gallops  Abdomen: soft, nondistended, nontender, without mass or organomegaly  Skin: no lesions  Extremities: left leg clean ulcer, bilateral edema  Neurologic: alert, much less agitated, moves all extremities    LAB RESULTS:                        8.9    8.69  )-----------( 111      ( 23 Dec 2019 08:32 )             29.6     12-23    139  |  103  |  11  ----------------------------<  95  3.8   |  26  |  0.51    Ca    9.5      23 Dec 2019 06:57          MICROBIOLOGY:  RECENT CULTURES:  Culture - Urine (12.15.19 @ 03:30)    -  Amikacin: S <=16    -  Trimethoprim/Sulfamethoxazole: R >2/38    -  Ampicillin: R >16 These ampicillin results predict results for amoxicillin    -  Ampicillin/Sulbactam: R 16/8 Enterobacter, Citrobacter, and Serratia may develop resistance during prolonged therapy (3-4 days)    -  Aztreonam: R 16    -  Cefazolin: R >16 (MIC_CL_COM_ENTERIC_CEFAZU) For uncomplicated UTI with K. pneumoniae, E. coli, or P. mirablis: MARIA M <=16 is sensitive and MARIA M >=32 is resistant. This also predicts results for oral agents cefaclor, cefdinir, cefpodoxime, cefprozil, cefuroxime axetil, cephalexin and locarbef for uncomplicated UTI. Note that some isolates may be susceptible to these agents while testing resistant to cefazolin.    -  Cefepime: R >16    -  Cefoxitin: S <=8    -  Ciprofloxacin: R >2    -  Ertapenem: S <=1    -  Gentamicin: S <=4    -  Ceftriaxone: R >32 Enterobacter, Citrobacter, and Serratia may develop resistance during prolonged therapy    -  Imipenem: S <=1    -  Levofloxacin: R >4    -  Meropenem: S <=1    -  Nitrofurantoin: S <=32 Should not be used to treat pyelonephritis    -  Piperacillin/Tazobactam: R <=16    -  Tigecycline: S <=2    -  Tobramycin: S <=4    Specimen Source: .Urine Catheterized    Culture Results:   50,000 - 99,000 CFU/mL Escherichia coli ESBL  <10,000 CFU/ml Normal Urogenital love present    Organism Identification: Escherichia coli ESBL    Organism: Escherichia coli ESBL    Method Type: MARIA M        RADIOLOGY REVIEWED:  < from: [facility] Duplex Lower Ext Vein Scan, Bilat (12.16.19 @ 09:38) >  IMPRESSION:     No evidence of deep venous thrombosis in either lower extremity above the   knee.      < end of copied text >              Assessment:  patient with severe anxiety admitted after taking lots of pain meds and ativan, found to have edema of legs and stasis, developed fever and leukocytosis and urine grew esbl ecoli now doing much better on meropenem  Plan:  will stop meropenem today as planned  local care and edema control for legs.

## 2019-12-23 NOTE — DISCHARGE NOTE PROVIDER - NSDCCAREPROVSEEN_GEN_ALL_CORE_FT
Yuri, Emelyn Foy Fernando Alegria Manda  Hoorbod Delshadfar  Hoorbod Delshadfar  Hoorbod Delshadfar  Advance PracticeTeam [facility] Medicine  Emelyn Merida Doctor  Cindy Reyes Alan Bulbin John Ruddy Jacqueline Levin Dava [facility] Julie Engel Uttam Banik Leila Soberano Jourdan Brown Lincy Jojan Jaison Chacko Scott Singh Noreen Liotta Kathleen Parnell Magdich Alexia Gregov Jessica Steele Carlene Riboul Sarah Hughes Amy Mangan Angela Lau Jessica Caruso Malka Kruger Karon Gregory Neena [place] Terrance Duncan Thessa Marie Vesagas-Lindon

## 2019-12-23 NOTE — DISCHARGE NOTE PROVIDER - CARE PROVIDER_API CALL
Juan Salcedo)  Internal Medicine  [address] [address] [place] [address]  Phone: [phone]  Fax: [phone]  Follow Up Time:

## 2019-12-23 NOTE — BEHAVIORAL HEALTH ASSESSMENT NOTE - NS ED [facility] MED ROS EYES
FMLA or Disability Forms were received and faxed to the Forms Completion Department today at [phone]. (Please include all appropriate authorization forms with your fax).    Did you have the patient complete (in full) and sign the “Authorization For Disclosure of Health Information Forms Completion” form? Yes    Have you communicated that the Forms Completion Process may take up to 14 days? Yes    If you have questions about this encounter, please contact the Forms Completion Dept at [phone], Option 3.   No complaints

## 2019-12-23 NOTE — DISCHARGE NOTE PROVIDER - NSDCFUADDAPPT_GEN_ALL_CORE_FT
You will need to follow up with your primary medical doctor within one week of discharge - please call to make an appointment. You will need to follow up with your primary medical doctor within one week of discharge - please call to make an appointment.    Check CBC within one week to monitor Hemoglobin/Hematocrit.

## 2019-12-23 NOTE — PROGRESS NOTE BEHAVIORAL HEALTH - CASE SUMMARY
69 F with PPHX of delusional disorder and anxiety disorder with multiple psychiatric admissions and PMH  Anxiety, minimally ambulatory with use of walker,  HTN, HLD , NOEMY, asthma, COPD, DVT no longer on a/c , remote Breast Cancer, Kidney cancer  in remission , Graves disease, Hypothyroid, S/P laminectomy, carpal tunnel syndrome, Obesity, multiple admissions for sob in past attributed to anxiety/panic attacks, now p/w b/l LE edema and pain.   She complains of dyspnea and is admitted medically for cellulitis of the leg and UTI.  Psychiatry is consulted due to history of anxiety and paranoia.  Pt remains paranoid towards , labile, verbally agitated but overall redirectable, no SI/HI.   states pt has been paranoid for several years, worse since her psychiatrist retired 6 mos ago, not taking meds.  12/18: Pt AOx2, could not give year, excitable and with productive speech, labile at times and tearful.  Paranoid with family members per collateral, firing her aides 2/2 paranoia, not taking medications correctly or noncompliant altogether,  with safety concerns, feels pt might require psych admission.  12/20: VPA added for mood stabilization, less labile than yesterday but remains expansive, overinclusive, disoriented.  No SE to meds.  Restlessness continues, but has been consistent since admission, less likely akathisia/medication SE.  Agree with resident's assessment and plan as above. 69 F with PPHX of delusional disorder and anxiety disorder with multiple psychiatric admissions and PMH  Anxiety, minimally ambulatory with use of walker,  HTN, HLD , NOEMY, asthma, COPD, DVT no longer on a/c , remote Breast Cancer, Kidney cancer  in remission , Graves disease, Hypothyroid, S/P laminectomy, carpal tunnel syndrome, Obesity, multiple admissions for sob in past attributed to anxiety/panic attacks, now p/w b/l LE edema and pain.   She complains of dyspnea and is admitted medically for cellulitis of the leg and UTI.  Psychiatry is consulted due to history of anxiety and paranoia.  Pt remains paranoid towards , labile, verbally agitated but overall redirectable, no SI/HI.   states pt has been paranoid for several years, worse since her psychiatrist retired 6 mos ago, not taking meds.  12/18: Pt AOx2, could not give year, excitable and with productive speech, labile at times and tearful.  Paranoid with family members per collateral, firing her aides 2/2 paranoia, not taking medications correctly or noncompliant altogether,  with safety concerns, feels pt might require psych admission.  12/23: pt remains labile, excitable, delusional towards family ( was putting money in floor board yesterday per pt), no SI/HI.  Per nursing staff, pt has been shouting loudly, needing frequent reassurance.  Family feels pt requires psych admission, is not safe for d/c.  Agree with resident's assessment and plan as above: increase Zyprexa, 9.27 to inpt psych when medically cleared

## 2019-12-23 NOTE — DISCHARGE NOTE PROVIDER - NSDCFUSCHEDAPPT_GEN_ALL_CORE_FT
SHEKHAR VASQUEZ ; 12/24/2019 ; [facility] SHEKHAR VASQUEZ ; 12/26/2019 ; [facility]

## 2019-12-23 NOTE — PROGRESS NOTE ADULT - ASSESSMENT
_________________________________________________________________________________________  ========>>  M E D I C A L   A T T E N D I N G    F O L L O W  U P  N O T E  <<=========  -----------------------------------------------------------------------------------------------------    - Patient seen and examined by me earlier today.   - In summary,  SHEKHAR VASQUEZ is a 69y year old woman who originally presented with leg infection   - Patient today overall doing ok, comfortable, eating OK.     no other events otherwise , wants to go home !      ==================>> REVIEW OF SYSTEM <<=================    GEN: no fever, no chills, + mild pain in legs   RESP: reports occasional SOB now, no cough, no sputum  CVS: no chest pain now, no palpitations, + edema, unchanged   GI: no abdominal pain, no nausea  : no dysuria, no frequency, no hematuria  Neuro: no headache, no dizziness  Derm : no itching, no rash    ==================>> PHYSICAL EXAM <<=================    GEN: A&O X 3 , NAD , comfortable in recliner with legs down   HEENT: NCAT, PERRL, MMM, hearing intact  Neck: supple , no JVD  CVS: S1S2 , regular , No M/R/G appreciated  PULM: CTA B/L,  no W/R/R appreciated  ABD.: soft. non tender, non distended,  bowel sounds present  Extrem: intact pulses , + Bilateral LE edema stable        wound on left leg dressed         scattered ecchymotic areas throughout the body   PSYCH :  less anxious       ==================>> MEDICATIONS <<====================    ALBUTerol    90 MICROgram(s) HFA Inhaler 1 Puff(s) Inhalation every 4 hours  amLODIPine   Tablet 2.5 milliGRAM(s) Oral daily  atorvastatin 40 milliGRAM(s) Oral at bedtime  baclofen 5 milliGRAM(s) Oral three times a day  cholecalciferol 2000 Unit(s) Oral daily  clopidogrel Tablet 75 milliGRAM(s) Oral daily  cyanocobalamin 1000 MICROGram(s) Oral daily  diVALproex  milliGRAM(s) Oral two times a day  enoxaparin Injectable 40 milliGRAM(s) SubCutaneous daily  fluticasone propionate 50 MICROgram(s)/spray Nasal Spray 1 Spray(s) Both Nostrils two times a day  folic acid 1 milliGRAM(s) Oral daily  gabapentin 100 milliGRAM(s) Oral three times a day  levothyroxine 125 MICROGram(s) Oral daily  lidocaine   Patch 1 Patch Transdermal daily  losartan 100 milliGRAM(s) Oral daily  melatonin 3 milliGRAM(s) Oral at bedtime  montelukast 10 milliGRAM(s) Oral daily  OLANZapine 12.5 milliGRAM(s) Oral at bedtime  pantoprazole    Tablet 40 milliGRAM(s) Oral before breakfast  tiotropium 18 MICROgram(s) Capsule 1 Capsule(s) Inhalation daily  trihexyphenidyl 2 milliGRAM(s) Oral two times a day    MEDICATIONS  (PRN):  acetaminophen   Tablet .. 650 milliGRAM(s) Oral every 6 hours PRN Temp greater or equal to 38C (100.4F)  albuterol/ipratropium for Nebulization 3 milliLiter(s) Nebulizer every 6 hours PRN Shortness of Breath and/or Wheezing  LORazepam     Tablet 0.5 milliGRAM(s) Oral every 4 hours PRN anxiety or agitation  oxyCODONE    IR 5 milliGRAM(s) Oral every 6 hours PRN moderate or severe pain    ==================>> VITAL SIGNS <<==================    Vital Signs Last 24 Hrs  T(C): 36.8 (12-23-19 @ 14:32)  T(F): 98.2 (12-23-19 @ 14:32), Max: 98.2 (12-23-19 @ 14:32)  HR: 108 (12-23-19 @ 14:32) (76 - 108)  BP: 114/51 (12-23-19 @ 14:32)  RR: 18 (12-23-19 @ 14:32) (18 - 18)  SpO2: 97% (12-23-19 @ 14:32) (94% - 97%)       ==================>> LAB AND IMAGING <<==================                        8.9    8.69  )-----------( 111      ( 23 Dec 2019 08:32 )             29.6        12-23    139  |  103  |  11  ----------------------------<  95  3.8   |  26  |  0.51    Ca    9.5      23 Dec 2019 06:57      WBC count:   8.69 <<== ,  9.65 <<== ,  9.74 <<== ,  10.49 <<==   Hemoglobin:   8.9 <<==,  10.0 <<==,  9.4 <<==,  9.9 <<==  platelets:  111 <==, 105 <==, 97 <==, 95 <==    Creatinine:  0.51  <<==, 0.55  <<==, 0.57  <<==, 0.47  <<==  Sodium:   139  <==, 139  <==, 143  <==, 141  <==    ___________________________________________________________________________________  ===============>>  A S S E S S M E N T   A N D   P L A N <<===============  ------------------------------------------------------------------------------------------    · Assessment		  69 F PMH  Anxiety, HTN, HLD , NOEMY, asthma, COPD, DVT no longer on a/c , remote Breast Cancer, Kidney cancer  in remission , Graves disease, Hypothyroid , S/P laminectomy , carpal tunnel syndrome, Obesity, multiple admissions for sob in past attributed to anxiety/panic attacks, now p/w b/l LE edema and pain.      Problem/Plan - 1:  ·  Problem: Cellulitis of lower extremity / infected left leg wound > treated   --ID  appreciated   -finished  abx >> DCed  local wound care    treat edema with diuretics   leg elevation reiterated   ACE wrapping daily     Problem/Plan - 2:  ·  Problem:  Bacteriuria  now post fever  -ID appreciated   - finished abx >> observe off    Problem/Plan - 3:  ·  Problem: Mood disorder.  Plan: -Pt with complex anxiety spectrum disorder, paranoid delusion disorder with mood issues as noted on prior extensive behavioral health issues and psych admissions   - psych following  - Continue Current medications and monitor.   - pt need inpatient psych transfer     pt medically cleared for Tx     Problem/Plan - 4:  ·  Problem: Other chronic pain.  Plan: -c/w baclofen  - Continue Current medications and monitor.     Problem/Plan - 5:  ·  Problem: Debility  - PT / OOB as able   -fall precautions     Problem/Plan - 6:  Problem: Essential hypertension.  -c/w home losartan, norvasc.  - cardio following     Problem/Plan - 7:  ·  Problem: Prophylactic measure.  Plan: -lovenox vte ppx.    keep legs elevated     --------------------------------------------  Case discussed with pt, NP  Education given on findings and plan of care  ___________________________  H. JANICE Welch.  Pager: [phone]

## 2019-12-23 NOTE — PROGRESS NOTE BEHAVIORAL HEALTH - NSBHCONSULTRECOMMENDOTHER_PSY_A_CORE FT
Recommendations:  - increase zyprexa to 12.5mg PO Q HS  - c/w depakote 250mg PO BID  - c/w ativan 0.5mg PO Q 6 hours PRN anxiety  - LFT wnl for now, cont to trend LFTs  - QTC <500 for now, order EKG  - 2PCs papers signed by
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
## 2019-12-23 NOTE — PROGRESS NOTE BEHAVIORAL HEALTH - SUMMARY
69 F with PPHX of delusional disorder and anxiety disorder with multiple psychiatric admissions and PMH  Anxiety, minimally ambulatory with use of walker,  HTN, HLD , NOEMY, asthma, COPD, DVT no longer on a/c , remote Breast Cancer, Kidney cancer  in remission , Graves disease, Hypothyroid , S/P laminectomy , carpal tunnel syndrome, Obesity, multiple admissions for sob in past attributed to anxiety/panic attacks, now p/w b/l LE edema and pain.   She complains of dyspnea and is admitted medically for cellulitis of the leg and UTI.  Psychiatry is consulted due to history of anxiety and current anxiety and tearfulness in ED as a result of her medical admission.    Originally presented with disheveled and unkempt with high anxiety, paranoia and delusions of persecution from her , suspected olfactory hallucinations related to perfumes and fear of death from medical illness. Today, pt was cooperative with interview and showed better emotinal regualtion, but continues to have olfactory hallucinations and persecutory delusions. Discussed with family inpt psych admission, which they are agreeable with, and 2PCs papers will be added to chart this PM.     Recommendations:  - increase zyprexa to 12.5mg PO Q HS  - c/w depakote 250mg PO BID  - c/w ativan 0.5mg PO Q 6 hours PRN anxiety  - LFT wnl for now, cont to trend LFTs  - QTC <500 for now, order EKG  - 2PCs papers signed by

## 2019-12-23 NOTE — DISCHARGE NOTE PROVIDER - NSDCMRMEDTOKEN_GEN_ALL_CORE_FT
amLODIPine 2.5 mg oral tablet: 1 tab(s) orally once a day  aspirin 81 mg oral tablet, chewable: 1 tab(s) orally once a day  atorvastatin 40 mg oral tablet: 1 tab(s) orally once a day (at bedtime)  baclofen 5 mg oral tablet: 1 tab(s) orally 3 times a day  cholecalciferol 2000 intl units oral tablet: 1 tab(s) orally once a day  cholecalciferol oral tablet: 1000 unit(s) orally once a day  citalopram 10 mg oral tablet: 3 tab(s) orally once a day  clopidogrel 75 mg oral tablet: 1 tab(s) orally once a day  fluticasone 50 mcg/inh nasal spray: 1 spray(s) nasal 2 times a day  folic acid 1 mg oral tablet: 1 tab(s) orally once a day  gabapentin 100 mg oral capsule: 1 cap(s) orally 3 times a day  ipratropium-albuterol 0.5 mg-2.5 mg/3 mLinhalation solution: 3 milliliter(s) inhaled every 6 hours  levothyroxine 125 mcg (0.125 mg) oral tablet: 1 tab(s) orally   lidocaine 5% topical film: Apply topically to affected area once a day  LORazepam 0.5 mg oral tablet: 1 tab(s) orally every 6 hours, As needed, Agitation  losartan 100 mg oral tablet: 1 tab(s) orally once a day  lurasidone 40 mg oral tablet: 1 tab(s) orally once a day  melatonin 3 mg oral tablet: 1 tab(s) orally once a day (at bedtime)  menthol-benzocaine 3.6 mg-15 mg mucous membrane lozenge: 1 dose(s) mucous membrane 3 times a day  montelukast 10 mg oral tablet: 1 tab(s) orally once a day (at bedtime)  OLANZapine 2.5 mg oral tablet: 1 tab(s) orally 2 times a day, As needed, Psychosis  pantoprazole 40 mg oral delayed release tablet: 1 tab(s) orally once a day  trihexyphenidyl 2 mg oral tablet: 1 tab(s) orally 2 times a day amLODIPine 2.5 mg oral tablet: 1 tab(s) orally once a day  atorvastatin 40 mg oral tablet: 1 tab(s) orally once a day (at bedtime)  baclofen 5 mg oral tablet: 1 tab(s) orally 3 times a day  cholecalciferol oral tablet: 2000 unit(s) orally once a day  citalopram 10 mg oral tablet: 3 tab(s) orally once a day  clopidogrel 75 mg oral tablet: 1 tab(s) orally once a day  fluticasone 50 mcg/inh nasal spray: 1 spray(s) nasal 2 times a day  folic acid 1 mg oral tablet: 1 tab(s) orally once a day  gabapentin 100 mg oral capsule: 1 cap(s) orally 3 times a day  ipratropium-albuterol 0.5 mg-2.5 mg/3 mLinhalation solution: 3 milliliter(s) inhaled every 6 hours  levothyroxine 125 mcg (0.125 mg) oral tablet: 1 tab(s) orally   lidocaine 5% topical film: Apply topically to affected area once a day  LORazepam 0.5 mg oral tablet: 1 tab(s) orally every 6 hours, As needed, Agitation  losartan 100 mg oral tablet: 1 tab(s) orally once a day  lurasidone 40 mg oral tablet: 1 tab(s) orally once a day  melatonin 3 mg oral tablet: 1 tab(s) orally once a day (at bedtime)  menthol-benzocaine 3.6 mg-15 mg mucous membrane lozenge: 1 dose(s) mucous membrane 3 times a day  montelukast 10 mg oral tablet: 1 tab(s) orally once a day (at bedtime)  OLANZapine 2.5 mg oral tablet: 1 tab(s) orally 2 times a day, As needed, Psychosis  oxyCODONE 5 mg oral tablet: 1 tab(s) orally every 6 hours, As needed, moderate or severe pain  pantoprazole 40 mg oral delayed release tablet: 1 tab(s) orally once a day  trihexyphenidyl 2 mg oral tablet: 1 tab(s) orally 2 times a day acetaminophen 325 mg oral tablet: 2 tab(s) orally every 6 hours, As needed, Temp greater or equal to 38C (100.4F)  amLODIPine 2.5 mg oral tablet: 1 tab(s) orally once a day  atorvastatin 40 mg oral tablet: 1 tab(s) orally once a day (at bedtime)  baclofen 5 mg oral tablet: 1 tab(s) orally 3 times a day  cholecalciferol oral tablet: 2000 unit(s) orally once a day  clopidogrel 75 mg oral tablet: 1 tab(s) orally once a day  cyanocobalamin 1000 mcg oral tablet: 1 tab(s) orally once a day  divalproex sodium 250 mg oral delayed release tablet: 1 tab(s) orally 2 times a day  fluticasone 50 mcg/inh nasal spray: 1 spray(s) nasal 2 times a day  folic acid 1 mg oral tablet: 1 tab(s) orally once a day  gabapentin 100 mg oral capsule: 1 cap(s) orally 3 times a day  ipratropium-albuterol 0.5 mg-2.5 mg/3 mLinhalation solution: 3 milliliter(s) inhaled every 6 hours  levothyroxine 125 mcg (0.125 mg) oral tablet: 1 tab(s) orally once a day  lidocaine 5% topical film: Apply topically to affected area once a day  12 hours on and 12 hours off  LORazepam 0.5 mg oral tablet: 1 tab(s) orally every 4 hours, As needed, anxiety or agitation  losartan 100 mg oral tablet: 1 tab(s) orally once a day  melatonin 3 mg oral tablet: 1 tab(s) orally once a day (at bedtime)  montelukast 10 mg oral tablet: 1 tab(s) orally once a day  OLANZapine 2.5 mg oral tablet: 5 tab(s) orally once a day (at bedtime)  oxyCODONE 5 mg oral tablet: 1 tab(s) orally every 6 hours, As needed, moderate or severe pain  pantoprazole 40 mg oral delayed release tablet: 1 tab(s) orally once a day (before a meal)  trihexyphenidyl 2 mg oral tablet: 1 tab(s) orally 2 times a day

## 2019-12-23 NOTE — DISCHARGE NOTE PROVIDER - NSDCCPCAREPLAN_GEN_ALL_CORE_FT
PRINCIPAL DISCHARGE DIAGNOSIS  Diagnosis: Cellulitis of lower extremity, unspecified laterality  Assessment and Plan of Treatment: Take all of your antibiotics as ordered.  Call your Health Care Provider within two days of arriving home to make a follow up appointment within one week.  If the affected cellulitic area increases in redness, warmth, pain or swelling call your Health Care Provider.  If you develop fever, chills, and/or malaise, call your Health Care Provider.      SECONDARY DISCHARGE DIAGNOSES  Diagnosis: Urinary tract infection without hematuria, site unspecified  Assessment and Plan of Treatment: HOME CARE INSTRUCTIONS  f you were prescribed antibiotics, take them exactly as your caregiver instructs you. Finish the medication even if you feel better after you have only taken some of the medication.  Drink enough water and fluids to keep your urine clear or pale yellow.  Avoid caffeine, tea, and carbonated beverages. They tend to irritate your bladder.  Empty your bladder often. Avoid holding urine for long periods of time.  After a bowel movement, women should cleanse from front to back. Use each tissue only once.  SEEK MEDICAL CARE IF:  You have back pain.  You develop a fever.  Your symptoms do not begin to resolve within 3 days.  SEEK IMMEDIATE MEDICAL CARE IF:  You have severe back pain or lower abdominal pain.  You develop chills.  You have nausea or vomiting.  You have continued burning or discomfort with urination.    Diagnosis: Delusional disorder  Assessment and Plan of Treatment: Transfer to inpatient psych facility for further psychiatric care PRINCIPAL DISCHARGE DIAGNOSIS  Diagnosis: Cellulitis of lower extremity, unspecified laterality  Assessment and Plan of Treatment: Your antibiotic course is completed.  Call your Health Care Provider within two days of arriving home to make a follow up appointment within one week.  If the affected cellulitic area increases in redness, warmth, pain or swelling call your Health Care Provider.  If you develop fever, chills, and/or malaise, call your Health Care Provider.      SECONDARY DISCHARGE DIAGNOSES  Diagnosis: Delusional disorder  Assessment and Plan of Treatment: Transfer to inpatient psych facility for further psychiatric care    Diagnosis: Urinary tract infection without hematuria, site unspecified  Assessment and Plan of Treatment: You completed your course of antibiotics.  Drink enough water and fluids to keep your urine clear or pale yellow.  Avoid caffeine, tea, and carbonated beverages. They tend to irritate your bladder.  Empty your bladder often. Avoid holding urine for long periods of time.  After a bowel movement, women should cleanse from front to back. Use each tissue only once.  SEEK MEDICAL CARE IF:  You have back pain.  You develop a fever.  Your symptoms do not begin to resolve within 3 days.  SEEK IMMEDIATE MEDICAL CARE IF:  You have severe back pain or lower abdominal pain.  You develop chills.  You have nausea or vomiting.  You have continued burning or discomfort with urination. PRINCIPAL DISCHARGE DIAGNOSIS  Diagnosis: Cellulitis of lower extremity, unspecified laterality  Assessment and Plan of Treatment: Your antibiotic course is completed.  Call your Health Care Provider within two days of arriving home to make a follow up appointment within one week.  If the affected cellulitic area increases in redness, warmth, pain or swelling call your Health Care Provider.  If you develop fever, chills, and/or malaise, call your Health Care Provider.      SECONDARY DISCHARGE DIAGNOSES  Diagnosis: Diarrhea  Assessment and Plan of Treatment: C-diff negative    Diagnosis: Delusional disorder  Assessment and Plan of Treatment: Transfer to inpatient psych facility for further psychiatric care    Diagnosis: Essential hypertension  Assessment and Plan of Treatment: Low salt diet  Activity as tolerated.  Take all medication as prescribed.  Follow up with your medical doctor for routine blood pressure monitoring at your next visit.  Notify your doctor if you have any of the following symptoms:   Dizziness, Lightheadedness, Blurry vision, Headache, Chest pain, Shortness of breath      Diagnosis: Urinary tract infection without hematuria, site unspecified  Assessment and Plan of Treatment: You completed your course of antibiotics.  Drink enough water and fluids to keep your urine clear or pale yellow.  Avoid caffeine, tea, and carbonated beverages. They tend to irritate your bladder.  Empty your bladder often. Avoid holding urine for long periods of time.  After a bowel movement, women should cleanse from front to back. Use each tissue only once.  SEEK MEDICAL CARE IF:  You have back pain.  You develop a fever.  Your symptoms do not begin to resolve within 3 days.  SEEK IMMEDIATE MEDICAL CARE IF:  You have severe back pain or lower abdominal pain.  You develop chills.  You have nausea or vomiting.  You have continued burning or discomfort with urination. PRINCIPAL DISCHARGE DIAGNOSIS  Diagnosis: Cellulitis of lower extremity, unspecified laterality  Assessment and Plan of Treatment: Your antibiotic course is completed.  Keep legs elevated with sitting/lying.  Bilateral lower extremity ACE wraps on in am and off at night.  Call your Health Care Provider within two days of arriving home to make a follow up appointment within one week.  If the affected cellulitic area increases in redness, warmth, pain or swelling call your Health Care Provider.  If you develop fever, chills, and/or malaise, call your Health Care Provider.      SECONDARY DISCHARGE DIAGNOSES  Diagnosis: Anemia  Assessment and Plan of Treatment: You will need a CBC checked within one week to monitor your H/H.    Diagnosis: Diarrhea  Assessment and Plan of Treatment: C-diff negative    Diagnosis: Delusional disorder  Assessment and Plan of Treatment: Transfer to inpatient psych facility for further psychiatric care    Diagnosis: Essential hypertension  Assessment and Plan of Treatment: Low salt diet  Activity as tolerated.  Take all medication as prescribed.  Follow up with your medical doctor for routine blood pressure monitoring at your next visit.  Notify your doctor if you have any of the following symptoms:   Dizziness, Lightheadedness, Blurry vision, Headache, Chest pain, Shortness of breath      Diagnosis: Urinary tract infection without hematuria, site unspecified  Assessment and Plan of Treatment: You completed your course of antibiotics.  Drink enough water and fluids to keep your urine clear or pale yellow.  Avoid caffeine, tea, and carbonated beverages. They tend to irritate your bladder.  Empty your bladder often. Avoid holding urine for long periods of time.  After a bowel movement, women should cleanse from front to back. Use each tissue only once.  SEEK MEDICAL CARE IF:  You have back pain.  You develop a fever.  Your symptoms do not begin to resolve within 3 days.  SEEK IMMEDIATE MEDICAL CARE IF:  You have severe back pain or lower abdominal pain.  You develop chills.  You have nausea or vomiting.  You have continued burning or discomfort with urination.

## 2019-12-23 NOTE — DISCHARGE NOTE PROVIDER - HOSPITAL COURSE
69 F PMH  Anxiety, HTN, HLD , NOEMY, asthma, COPD, DVT no longer on a/c , remote Breast Cancer, Kidney cancer  in remission , Graves disease, Hypothyroid , S/P laminectomy , carpal tunnel syndrome, Obesity, multiple admissions for sob in past attributed to anxiety/panic attacks, now p/w b/l LE edema and pain. treated for cellulitis and urine grew esbl ecoli. completed course of meropenum. Psych consulted for high anxiety, paranoia and delusions of persecution from her . medications adjusted. 2 PC to inpatient psych facility for further psych care. 69 F PMH  Anxiety, HTN, HLD , NOEMY, asthma, COPD, DVT no longer on a/c , remote Breast Cancer, Kidney cancer  in remission , Graves disease, Hypothyroid , S/P laminectomy , carpal tunnel syndrome, Obesity, multiple admissions for sob in past attributed to anxiety/panic attacks, now p/w b/l LE edema and pain.  Treated for cellulitis and urine grew esbl ecoli. Completed course of meropenum. Diarrhea noted - CDiff negative.  Psych consulted for high anxiety, paranoia and delusions of persecution from her . Medications adjusted. 2 PC to inpatient psych facility for further psych care. 69 F PMH  Anxiety, HTN, HLD , NOEMY, asthma, COPD, DVT no longer on a/c, remote Breast Cancer, Kidney cancer in remission , Graves disease, Hypothyroid , S/P laminectomy , carpal tunnel syndrome, Obesity, multiple admissions for sob in past attributed to anxiety/panic attacks, on 12/15 p/w b/l LE edema and pain.  Treated for cellulitis and urine grew (+) esbl ecoli. Completed course of meropenum. Diarrhea noted - CDiff negative.  Psych consulted for high anxiety, paranoia and delusions of persecution from her . Medications adjusted. 2 PC to inpatient psych facility for further psych care.          On 12/26  - pt. with no active diarrheas and remains incontinent for BM. BM was formed and pt. tolerating diet. Seen and followed by psych and recommending in-pt [facility] - Psych. Discussed with Dr. Welch and with psych and plan today is for in-pt behavioral health admission discussed with family. 69 F PMH  Anxiety, HTN, HLD , NOEMY, asthma, COPD, DVT no longer on a/c, remote Breast Cancer, Kidney cancer in remission , Graves disease, Hypothyroid , S/P laminectomy , carpal tunnel syndrome, Obesity, multiple admissions for sob in past attributed to anxiety/panic attacks, on 12/15 p/w b/l LE edema and pain.  Treated for cellulitis and urine grew (+) esbl ecoli. Completed course of meropenum. Diarrhea noted - CDiff negative.  Psych consulted for high anxiety, paranoia and delusions of persecution from her . Medications adjusted. 2 PC to inpatient psych facility for further psych care.          On 12/26  - pt. with no active diarrheas and remains incontinent for BM. BM was formed and pt. tolerating diet. Seen and followed by psych and recommending in-pt [facility] - Psych. Discussed with Dr. Welch and with psych and plan today is for in-pt behavioral health admission discussed with family.  Pt. will be dc'd to [facility]

## 2019-12-24 LAB
ANION GAP SERPL CALC-SCNC: 13 MMOL/L — SIGNIFICANT CHANGE UP (ref 5–17)
BUN SERPL-MCNC: 10 MG/DL — SIGNIFICANT CHANGE UP (ref 7–23)
C DIFF GDH STL QL: NEGATIVE — SIGNIFICANT CHANGE UP
C DIFF GDH STL QL: SIGNIFICANT CHANGE UP
CALCIUM SERPL-MCNC: 9.4 MG/DL — SIGNIFICANT CHANGE UP (ref 8.4–10.5)
CHLORIDE SERPL-SCNC: 103 MMOL/L — SIGNIFICANT CHANGE UP (ref 96–108)
CO2 SERPL-SCNC: 25 MMOL/L — SIGNIFICANT CHANGE UP (ref 22–31)
CREAT SERPL-MCNC: 0.46 MG/DL — LOW (ref 0.5–1.3)
GLUCOSE SERPL-MCNC: 91 MG/DL — SIGNIFICANT CHANGE UP (ref 70–99)
HCT VFR BLD CALC: 31.4 % — LOW (ref 34.5–45)
HGB BLD-MCNC: 9.4 G/DL — LOW (ref 11.5–15.5)
MAGNESIUM SERPL-MCNC: 2 MG/DL — SIGNIFICANT CHANGE UP (ref 1.6–2.6)
MCHC RBC-ENTMCNC: 27.3 PG — SIGNIFICANT CHANGE UP (ref 27–34)
MCHC RBC-ENTMCNC: 29.9 GM/DL — LOW (ref 32–36)
MCV RBC AUTO: 91.3 FL — SIGNIFICANT CHANGE UP (ref 80–100)
NRBC # BLD: 0 /100 WBCS — SIGNIFICANT CHANGE UP (ref 0–0)
PLATELET # BLD AUTO: 143 K/UL — LOW (ref 150–400)
POTASSIUM SERPL-MCNC: 3.4 MMOL/L — LOW (ref 3.5–5.3)
POTASSIUM SERPL-SCNC: 3.4 MMOL/L — LOW (ref 3.5–5.3)
RBC # BLD: 3.44 M/UL — LOW (ref 3.8–5.2)
RBC # FLD: 15.7 % — HIGH (ref 10.3–14.5)
SODIUM SERPL-SCNC: 141 MMOL/L — SIGNIFICANT CHANGE UP (ref 135–145)
WBC # BLD: 7.45 K/UL — SIGNIFICANT CHANGE UP (ref 3.8–10.5)
WBC # FLD AUTO: 7.45 K/UL — SIGNIFICANT CHANGE UP (ref 3.8–10.5)

## 2019-12-24 PROCEDURE — 99232 SBSQ HOSP IP/OBS MODERATE 35: CPT | Mod: GC

## 2019-12-24 RX ORDER — LOSARTAN POTASSIUM 100 MG/1
1 TABLET, FILM COATED ORAL
Qty: 0 | Refills: 0 | DISCHARGE
Start: 2019-12-24

## 2019-12-24 RX ORDER — POTASSIUM CHLORIDE 20 MEQ
20 PACKET (EA) ORAL ONCE
Refills: 0 | Status: COMPLETED | OUTPATIENT
Start: 2019-12-24 | End: 2019-12-24

## 2019-12-24 RX ORDER — PANTOPRAZOLE SODIUM 20 MG/1
1 TABLET, DELAYED RELEASE ORAL
Qty: 0 | Refills: 0 | DISCHARGE
Start: 2019-12-24

## 2019-12-24 RX ORDER — ACETAMINOPHEN 500 MG
2 TABLET ORAL
Qty: 0 | Refills: 0 | DISCHARGE
Start: 2019-12-24

## 2019-12-24 RX ORDER — LEVOTHYROXINE SODIUM 125 MCG
1 TABLET ORAL
Qty: 0 | Refills: 0 | DISCHARGE
Start: 2019-12-24

## 2019-12-24 RX ORDER — DIVALPROEX SODIUM 500 MG/1
1 TABLET, DELAYED RELEASE ORAL
Qty: 0 | Refills: 0 | DISCHARGE
Start: 2019-12-24

## 2019-12-24 RX ORDER — AMLODIPINE BESYLATE 2.5 MG/1
1 TABLET ORAL
Qty: 0 | Refills: 0 | DISCHARGE
Start: 2019-12-24

## 2019-12-24 RX ORDER — TRIHEXYPHENIDYL HCL 2 MG
1 TABLET ORAL
Qty: 0 | Refills: 0 | DISCHARGE
Start: 2019-12-24

## 2019-12-24 RX ORDER — FOLIC ACID 0.8 MG
1 TABLET ORAL
Qty: 0 | Refills: 0 | DISCHARGE
Start: 2019-12-24

## 2019-12-24 RX ORDER — ATORVASTATIN CALCIUM 80 MG/1
1 TABLET, FILM COATED ORAL
Qty: 0 | Refills: 0 | DISCHARGE
Start: 2019-12-24

## 2019-12-24 RX ORDER — CLOPIDOGREL BISULFATE 75 MG/1
1 TABLET, FILM COATED ORAL
Qty: 0 | Refills: 0 | DISCHARGE
Start: 2019-12-24

## 2019-12-24 RX ORDER — FLUTICASONE PROPIONATE 50 MCG
1 SPRAY, SUSPENSION NASAL
Qty: 0 | Refills: 0 | DISCHARGE
Start: 2019-12-24

## 2019-12-24 RX ORDER — LIDOCAINE 4 G/100G
1 CREAM TOPICAL
Qty: 0 | Refills: 0 | DISCHARGE
Start: 2019-12-24

## 2019-12-24 RX ORDER — GABAPENTIN 400 MG/1
1 CAPSULE ORAL
Qty: 0 | Refills: 0 | DISCHARGE
Start: 2019-12-24

## 2019-12-24 RX ORDER — BACLOFEN 100 %
1 POWDER (GRAM) MISCELLANEOUS
Qty: 0 | Refills: 0 | DISCHARGE
Start: 2019-12-24

## 2019-12-24 RX ORDER — FOLIC ACID 0.8 MG
1 TABLET ORAL
Qty: 30 | Refills: 0

## 2019-12-24 RX ORDER — OLANZAPINE 15 MG/1
5 TABLET, FILM COATED ORAL
Qty: 0 | Refills: 0 | DISCHARGE
Start: 2019-12-24

## 2019-12-24 RX ORDER — LOPERAMIDE HCL 2 MG
2 TABLET ORAL ONCE
Refills: 0 | Status: COMPLETED | OUTPATIENT
Start: 2019-12-24 | End: 2019-12-24

## 2019-12-24 RX ORDER — LANOLIN ALCOHOL/MO/W.PET/CERES
1 CREAM (GRAM) TOPICAL
Qty: 0 | Refills: 0 | DISCHARGE
Start: 2019-12-24

## 2019-12-24 RX ORDER — OXYCODONE HYDROCHLORIDE 5 MG/1
1 TABLET ORAL
Qty: 0 | Refills: 0 | DISCHARGE
Start: 2019-12-24

## 2019-12-24 RX ORDER — IPRATROPIUM/ALBUTEROL SULFATE 18-103MCG
3 AEROSOL WITH ADAPTER (GRAM) INHALATION
Qty: 0 | Refills: 0 | DISCHARGE
Start: 2019-12-24

## 2019-12-24 RX ORDER — CHOLECALCIFEROL (VITAMIN D3) 125 MCG
2000 CAPSULE ORAL
Qty: 0 | Refills: 0 | DISCHARGE
Start: 2019-12-24

## 2019-12-24 RX ORDER — PREGABALIN 225 MG/1
1 CAPSULE ORAL
Qty: 0 | Refills: 0 | DISCHARGE
Start: 2019-12-24

## 2019-12-24 RX ORDER — MONTELUKAST 4 MG/1
1 TABLET, CHEWABLE ORAL
Qty: 0 | Refills: 0 | DISCHARGE
Start: 2019-12-24

## 2019-12-24 RX ADMIN — Medication 125 MICROGRAM(S): at 05:23

## 2019-12-24 RX ADMIN — Medication 3 MILLILITER(S): at 14:15

## 2019-12-24 RX ADMIN — Medication 2000 UNIT(S): at 11:37

## 2019-12-24 RX ADMIN — MONTELUKAST 10 MILLIGRAM(S): 4 TABLET, CHEWABLE ORAL at 11:37

## 2019-12-24 RX ADMIN — ENOXAPARIN SODIUM 40 MILLIGRAM(S): 100 INJECTION SUBCUTANEOUS at 11:38

## 2019-12-24 RX ADMIN — AMLODIPINE BESYLATE 2.5 MILLIGRAM(S): 2.5 TABLET ORAL at 05:24

## 2019-12-24 RX ADMIN — PANTOPRAZOLE SODIUM 40 MILLIGRAM(S): 20 TABLET, DELAYED RELEASE ORAL at 05:25

## 2019-12-24 RX ADMIN — LIDOCAINE 1 PATCH: 4 CREAM TOPICAL at 11:37

## 2019-12-24 RX ADMIN — Medication 3 MILLILITER(S): at 21:55

## 2019-12-24 RX ADMIN — Medication 2 MILLIGRAM(S): at 18:21

## 2019-12-24 RX ADMIN — Medication 2 MILLIGRAM(S): at 05:24

## 2019-12-24 RX ADMIN — Medication 2 MILLIGRAM(S): at 14:16

## 2019-12-24 RX ADMIN — GABAPENTIN 100 MILLIGRAM(S): 400 CAPSULE ORAL at 13:55

## 2019-12-24 RX ADMIN — DIVALPROEX SODIUM 250 MILLIGRAM(S): 500 TABLET, DELAYED RELEASE ORAL at 11:37

## 2019-12-24 RX ADMIN — GABAPENTIN 100 MILLIGRAM(S): 400 CAPSULE ORAL at 05:24

## 2019-12-24 RX ADMIN — Medication 5 MILLIGRAM(S): at 05:24

## 2019-12-24 RX ADMIN — Medication 5 MILLIGRAM(S): at 13:55

## 2019-12-24 RX ADMIN — Medication 5 MILLIGRAM(S): at 21:05

## 2019-12-24 RX ADMIN — LIDOCAINE 1 PATCH: 4 CREAM TOPICAL at 21:08

## 2019-12-24 RX ADMIN — Medication 1 MILLIGRAM(S): at 11:37

## 2019-12-24 RX ADMIN — OXYCODONE HYDROCHLORIDE 5 MILLIGRAM(S): 5 TABLET ORAL at 11:38

## 2019-12-24 RX ADMIN — LOSARTAN POTASSIUM 100 MILLIGRAM(S): 100 TABLET, FILM COATED ORAL at 05:24

## 2019-12-24 RX ADMIN — GABAPENTIN 100 MILLIGRAM(S): 400 CAPSULE ORAL at 21:06

## 2019-12-24 RX ADMIN — Medication 0.5 MILLIGRAM(S): at 21:54

## 2019-12-24 RX ADMIN — PREGABALIN 1000 MICROGRAM(S): 225 CAPSULE ORAL at 11:37

## 2019-12-24 RX ADMIN — CLOPIDOGREL BISULFATE 75 MILLIGRAM(S): 75 TABLET, FILM COATED ORAL at 11:37

## 2019-12-24 RX ADMIN — DIVALPROEX SODIUM 250 MILLIGRAM(S): 500 TABLET, DELAYED RELEASE ORAL at 21:05

## 2019-12-24 RX ADMIN — Medication 0.5 MILLIGRAM(S): at 13:54

## 2019-12-24 RX ADMIN — Medication 3 MILLIGRAM(S): at 21:07

## 2019-12-24 RX ADMIN — ATORVASTATIN CALCIUM 40 MILLIGRAM(S): 80 TABLET, FILM COATED ORAL at 21:05

## 2019-12-24 RX ADMIN — OLANZAPINE 12.5 MILLIGRAM(S): 15 TABLET, FILM COATED ORAL at 21:08

## 2019-12-24 NOTE — PROGRESS NOTE ADULT - SUBJECTIVE AND OBJECTIVE BOX
Cardiovascular Disease Progress Note    Overnight events: No acute events overnight.  didnt have any asthma attacks. no new cardiac sx  Otherwise review of systems negative    Objective Findings:  T(C): 37 (12-24-19 @ 04:16), Max: 37.6 (12-23-19 @ 20:53)  HR: 88 (12-24-19 @ 04:16) (88 - 108)  BP: 121/65 (12-24-19 @ 04:16) (114/51 - 140/69)  RR: 18 (12-24-19 @ 04:16) (18 - 18)  SpO2: 99% (12-24-19 @ 04:16) (97% - 99%)  Wt(kg): --  Daily     Daily       Physical Exam:  Gen: NAD  HEENT: EOMI  CV: RRR, normal S1 + S2, no m/r/g  Lungs: CTAB  Abd: soft, non-tender  Ext: mild edema    Laboratory Data:                        8.9    8.69  )-----------( 111      ( 23 Dec 2019 08:32 )             29.6     12-23    139  |  103  |  11  ----------------------------<  95  3.8   |  26  |  0.51    Ca    9.5      23 Dec 2019 06:57                Inpatient Medications:  MEDICATIONS  (STANDING):  ALBUTerol    90 MICROgram(s) HFA Inhaler 1 Puff(s) Inhalation every 4 hours  amLODIPine   Tablet 2.5 milliGRAM(s) Oral daily  atorvastatin 40 milliGRAM(s) Oral at bedtime  baclofen 5 milliGRAM(s) Oral three times a day  cholecalciferol 2000 Unit(s) Oral daily  clopidogrel Tablet 75 milliGRAM(s) Oral daily  cyanocobalamin 1000 MICROGram(s) Oral daily  diVALproex  milliGRAM(s) Oral two times a day  enoxaparin Injectable 40 milliGRAM(s) SubCutaneous daily  fluticasone propionate 50 MICROgram(s)/spray Nasal Spray 1 Spray(s) Both Nostrils two times a day  folic acid 1 milliGRAM(s) Oral daily  gabapentin 100 milliGRAM(s) Oral three times a day  levothyroxine 125 MICROGram(s) Oral daily  lidocaine   Patch 1 Patch Transdermal daily  losartan 100 milliGRAM(s) Oral daily  melatonin 3 milliGRAM(s) Oral at bedtime  montelukast 10 milliGRAM(s) Oral daily  OLANZapine 12.5 milliGRAM(s) Oral at bedtime  pantoprazole    Tablet 40 milliGRAM(s) Oral before breakfast  tiotropium 18 MICROgram(s) Capsule 1 Capsule(s) Inhalation daily  trihexyphenidyl 2 milliGRAM(s) Oral two times a day      Assessment:  -sob  -le cellulitis  -atypical cp s/p recent LHC with normal cors  -venous insufficiency  -copd/asthma  -obesity  -melba  -severe anxiety  -hypothyroidism    Recs:  -c/w home antihypertensives and diuretics. appears euvolemic  -c/w bronchodilators  -psychiatry recs appreciated. dosing of anti-psychotics per psych. would probably benefit from inpatient psychiatry if agreeable due to her multiple hospitalizations and inability to care for herself in setting of delusional disorder etc. suspect she will not follow up with psych as an outpatient  -keep legs elevated, compression stockings and c/w diuretic. unclear why now on plavix. would attempt to clarify  -f/u ID. s/p merrem for tx esbl in urine  -DVT ppx  -dispo planning        Over 25 minutes spent on total encounter; more than 50% of the visit was spent counseling and/or coordinating care by the attending physician.      Juan Salcedo MD   Cardiovascular Disease  [phone]

## 2019-12-24 NOTE — CHART NOTE - NSCHARTNOTEFT_GEN_A_CORE
Request from Dr. Welch to facilitate patient discharge.  Medication reconciliation reviewed, revised, and resolved with Dr. Welch, who has medically cleared patient for discharge with follow up as advised.  As per Dr. Welch, no need for labs to be drawn today.  Please refer to discharge note for detailed hospital course.

## 2019-12-24 NOTE — PROGRESS NOTE BEHAVIORAL HEALTH - NSBHCONSULTRECOMMENDOTHER_PSY_A_CORE FT
Recommendations:  - c/w zyprexa to 12.5mg PO Q HS  - c/w depakote 250mg PO BID  - c/w ativan 0.5mg PO Q 6 hours PRN anxiety  - LFT wnl for now, cont to trend LFTs  - QTC <500 for now, order EKG  - 2PCs papers signed by

## 2019-12-24 NOTE — PROGRESS NOTE BEHAVIORAL HEALTH - NSBHFUPINTERVALHXFT_PSY_A_CORE
Pt was awake, alert, and orientated to self and place. States today is "december 23, 1999." Notes she slept well and no asthma attacks o/n. However, her food made her nauseous last night and reports she  has diarrhea this AM. Continues to worry that people might wear strong perfumes to give an asthma attack and worries that she might not have enough medicine at one point. Says is sad and upset because staff won't give her the phone so she can call her , who is supposed to pick her up today so she can go home as her doctors gave her the okay to leave. Wants to go home for the holidays. Denies any A/V hallucinations. Denies any SI/HI.       Pt states she is eating well and slept okay, except for an asthma attack that woke her up. Notes her nebulizer did not work, so they "had to give me stronger stuff." States people continue to "intentionally" wear perfume to give her asthma attacks. Noter her  took her money and was hiding it under the bed. States she feels safe for now, but endorses being afraid of the dark and worries that at night something bad will happen to her such as "being raped or something." Denies any SI/HI. Insists she wants to go home and notes two of her doctors already discharged her and she will be finishing her abx at home. Continues to decline inpt psych admission. Pt was awake, alert, and orientated to self and place. States today is "december 23, 1999." Notes she slept well and no asthma attacks o/n. However, her food made her nauseous last night and reports she  has diarrhea this AM. Continues to worry that people might wear strong perfumes to give an asthma attack and worries that she might not have enough medicine at one point. Says is sad and upset because staff won't give her the phone so she can call her , who is supposed to pick her up today so she can go home as her doctors gave her the okay to leave. Wants to go home for the holidays. Denies any A/V hallucinations. Denies any SI/HI. Pt was awake, alert, and orientated to self and place. States today is "december 23, 1999." Notes she slept well and no asthma attacks o/n. However, her food made her nauseous last night and reports she  has diarrhea this AM. Continues to worry that people might wear strong perfumes to provoke her asthma attacks and is concerned that she might not have enough medicine at one point. Says she is sad and upset because staff won't give her the phone so she can call her , who is supposed to pick her up today so she can go home as her doctors gave her the okay to leave. Wants to go home for the holidays. Denies any A/V hallucinations. Denies any SI/HI. Pt was awake, alert, and orientated to self and place. States today is "december 23, 1999." Notes she slept well and no asthma attacks o/n. However, her food made her nauseous last night and reports she  has diarrhea this AM. Continues to worry that people might wear strong perfumes to provoke her asthma attacks and is concerned that she might not have enough medicine at one point. Says she is sad and upset because staff won't give her the phone so she can call her , who is supposed to pick her up today so she can go home. States her "2 doctors" gave her the okay to leave. Wants to go home for the holidays. Denies any A/V hallucinations. Denies any SI/HI. Pt was awake, alert, and orientated to self and place. States today is "December 23, 1999." Notes she slept well and no asthma attacks o/n. However, her food made her nauseous last night and reports she  has diarrhea this AM. Continues to worry that people might wear strong perfumes to provoke her asthma attacks and is concerned that she might not have enough medicine at one point. Says she is sad and upset because staff won't give her the phone so she can call her , who is supposed to pick her up today so she can go home. States her "2 doctors" gave her the okay to leave. Wants to go home for the holidays. Denies any A/V hallucinations. Denies any SI/HI.

## 2019-12-24 NOTE — PROGRESS NOTE BEHAVIORAL HEALTH - CASE SUMMARY
69 F with PPHX of delusional disorder and anxiety disorder with multiple psychiatric admissions and PMH  Anxiety, minimally ambulatory with use of walker,  HTN, HLD , NOEMY, asthma, COPD, DVT no longer on a/c , remote Breast Cancer, Kidney cancer  in remission , Graves disease, Hypothyroid, S/P laminectomy, carpal tunnel syndrome, Obesity, multiple admissions for sob in past attributed to anxiety/panic attacks, now p/w b/l LE edema and pain.   She complains of dyspnea and is admitted medically for cellulitis of the leg and UTI.  Psychiatry is consulted due to history of anxiety and paranoia.  Pt remains paranoid towards , labile, verbally agitated but overall redirectable, no SI/HI.   states pt has been paranoid for several years, worse since her psychiatrist retired 6 mos ago, not taking meds.  12/24: pt remains paranoid, avoids thinking about the perfume issue became it makes her hyperaware of it, thinks her  is the one who needs a psychiatrist.  No SI/HI.  Agree with resident's assessment and plan as above; psychiatric admission when medically cleared, 2PC in chart.

## 2019-12-24 NOTE — PROGRESS NOTE ADULT - ASSESSMENT
_________________________________________________________________________________________  ========>>  M E D I C A L   A T T E N D I N G    F O L L O W  U P  N O T E  <<=========  -----------------------------------------------------------------------------------------------------    - Patient seen and examined by me earlier today.   - In summary,  SHEKHAR VASQUEZ is a 69y year old woman who originally presented with leg infection   - Patient today overall doing fairly   pt reportedly has had multiple bouts of diarrhea today!!     ==================>> REVIEW OF SYSTEM <<=================    GEN: no fever, no chills  RESP: reports occasional SOB now, no cough, no sputum  CVS: no chest pain now, no palpitations, + edema, unchanged   GI: no abdominal pain, no nausea, + Diarrhea as above   : no dysuria, no frequency, no hematuria  Neuro: no headache, no dizziness  Derm : no itching, no rash    ==================>> PHYSICAL EXAM <<=================    GEN: A&O X 3 , NAD , comfortable in recliner with legs down   HEENT: NCAT, PERRL, MMM, hearing intact  Neck: supple , no JVD  CVS: S1S2 , regular , No M/R/G appreciated  PULM: CTA B/L,  no W/R/R appreciated  ABD.: soft. non tender, non distended,  bowel sounds present  Extrem: intact pulses , + Bilateral LE edema stable        wound on left leg dressed         scattered ecchymotic areas throughout the body ,improved   PSYCH :  less anxious       ==================>> MEDICATIONS <<====================    ALBUTerol    90 MICROgram(s) HFA Inhaler 1 Puff(s) Inhalation every 4 hours  amLODIPine   Tablet 2.5 milliGRAM(s) Oral daily  atorvastatin 40 milliGRAM(s) Oral at bedtime  baclofen 5 milliGRAM(s) Oral three times a day  cholecalciferol 2000 Unit(s) Oral daily  clopidogrel Tablet 75 milliGRAM(s) Oral daily  cyanocobalamin 1000 MICROGram(s) Oral daily  diVALproex  milliGRAM(s) Oral two times a day  enoxaparin Injectable 40 milliGRAM(s) SubCutaneous daily  fluticasone propionate 50 MICROgram(s)/spray Nasal Spray 1 Spray(s) Both Nostrils two times a day  folic acid 1 milliGRAM(s) Oral daily  gabapentin 100 milliGRAM(s) Oral three times a day  levothyroxine 125 MICROGram(s) Oral daily  lidocaine   Patch 1 Patch Transdermal daily  losartan 100 milliGRAM(s) Oral daily  melatonin 3 milliGRAM(s) Oral at bedtime  montelukast 10 milliGRAM(s) Oral daily  OLANZapine 12.5 milliGRAM(s) Oral at bedtime  pantoprazole    Tablet 40 milliGRAM(s) Oral before breakfast  tiotropium 18 MICROgram(s) Capsule 1 Capsule(s) Inhalation daily  trihexyphenidyl 2 milliGRAM(s) Oral two times a day    MEDICATIONS  (PRN):  acetaminophen   Tablet .. 650 milliGRAM(s) Oral every 6 hours PRN Temp greater or equal to 38C (100.4F)  albuterol/ipratropium for Nebulization 3 milliLiter(s) Nebulizer every 6 hours PRN Shortness of Breath and/or Wheezing  LORazepam     Tablet 0.5 milliGRAM(s) Oral every 4 hours PRN anxiety or agitation  oxyCODONE    IR 5 milliGRAM(s) Oral every 6 hours PRN moderate or severe pain    ==================>> VITAL SIGNS <<==================    Vital Signs Last 24 Hrs  T(C): 36.9 (12-24-19 @ 12:11)  T(F): 98.4 (12-24-19 @ 12:11), Max: 99.6 (12-23-19 @ 20:53)  HR: 76 (12-24-19 @ 12:11) (76 - 108)  BP: 136/70 (12-24-19 @ 12:11)  RR: 18 (12-24-19 @ 12:11) (18 - 18)  SpO2: 97% (12-24-19 @ 12:11) (97% - 99%)       ==================>> LAB AND IMAGING <<==================                        8.9    8.69  )-----------( 111      ( 23 Dec 2019 08:32 )             29.6        139  |  103  |  11  ----------------------------<  95  3.8   |  26  |  0.51    Ca    9.5      23 Dec 2019 06:57    WBC count:   8.69 <<== ,  9.65 <<== ,  9.74 <<==   Hemoglobin:   8.9 <<==,  10.0 <<==,  9.4 <<==  platelets:  111 <==, 105 <==, 97 <==, 95 <==    Creatinine:  0.51  <<==, 0.55  <<==, 0.57  <<==  Sodium:   139  <==, 139  <==, 143  <==    ___________________________________________________________________________________  ===============>>  A S S E S S M E N T   A N D   P L A N <<===============  ------------------------------------------------------------------------------------------    · Assessment		  69 F PMH  Anxiety, HTN, HLD , NOEMY, asthma, COPD, DVT no longer on a/c , remote Breast Cancer, Kidney cancer  in remission , Graves disease, Hypothyroid , S/P laminectomy , carpal tunnel syndrome, Obesity, multiple admissions for sob in past attributed to anxiety/panic attacks, now p/w b/l LE edema and pain.      Problem/Plan - 1:  ·  Problem: Cellulitis of lower extremity / infected left leg wound > treated   --ID  appreciated   -finished  abx >> DCed  local wound care    treat edema with diuretics   leg elevation reiterated   ACE wrapping daily     pt now with diarrhea post abx treatment  check and rule out for Cdiff     if negative>> consider imodium as needed      pt otherwise without symptoms / pain..       Problem/Plan - 2:  ·  Problem:  Bacteriuria  now post fever  -ID appreciated   - finished abx >> observe off    Problem/Plan - 3:  ·  Problem: Mood disorder.  Plan: -Pt with complex anxiety spectrum disorder, paranoid delusion disorder with mood issues as noted on prior extensive behavioral health issues and psych admissions   - psych following  - Continue Current medications and monitor.   - pt need inpatient psych transfer     pt medically cleared for transfer if cdiff negative     Problem/Plan - 4:  ·  Problem: Other chronic pain.  Plan: -c/w baclofen  - Continue Current medications and monitor.     Problem/Plan - 5:  ·  Problem: Debility  - PT / OOB as able   -fall precautions     Problem/Plan - 6:  Problem: Essential hypertension.  -c/w home losartan, norvasc.  - cardio following     Problem/Plan - 7:  ·  Problem: Prophylactic measure.  Plan: -lovenox vte ppx.    keep legs elevated     --------------------------------------------  Case discussed with pt, NP, RN  Education given on findings and plan of care  ___________________________  H. JANICE Welch.  Pager: [phone]

## 2019-12-24 NOTE — PROGRESS NOTE BEHAVIORAL HEALTH - SUMMARY
69 F with PPHX of delusional disorder and anxiety disorder with multiple psychiatric admissions and PMH  Anxiety, minimally ambulatory with use of walker,  HTN, HLD , NOEMY, asthma, COPD, DVT no longer on a/c , remote Breast Cancer, Kidney cancer  in remission , Graves disease, Hypothyroid , S/P laminectomy , carpal tunnel syndrome, Obesity, multiple admissions for sob in past attributed to anxiety/panic attacks, now p/w b/l LE edema and pain.   She complains of dyspnea and is admitted medically for cellulitis of the leg and UTI.  Psychiatry is consulted due to history of anxiety and current anxiety and tearfulness in ED as a result of her medical admission.    Originally presented with disheveled and unkempt with high anxiety, paranoia and delusions of persecution from her , suspected olfactory hallucinations related to perfumes and fear of death from medical illness. Today, pt was cooperative with interview but seemed depressed compared to yesterday and continues to insist that she is ready to go home. Also with ongoing olfactory hallucinations and persecutory delusions. Potential transfer to [facility] today pending C diff results s/p new onset of diarrhea.     Recommendations:  - c/w zyprexa to 12.5mg PO Q HS  - c/w depakote 250mg PO BID  - c/w ativan 0.5mg PO Q 6 hours PRN anxiety  - LFT wnl for now, cont to trend LFTs  - QTC <500 for now, order EKG  - 2PCs papers signed by  69 F with PPHX of delusional disorder and anxiety disorder with multiple psychiatric admissions and PMH  Anxiety, minimally ambulatory with use of walker,  HTN, HLD , NOEMY, asthma, COPD, DVT no longer on a/c , remote Breast Cancer, Kidney cancer  in remission , Graves disease, Hypothyroid , S/P laminectomy , carpal tunnel syndrome, Obesity, multiple admissions for sob in past attributed to anxiety/panic attacks, now p/w b/l LE edema and pain.   She complains of dyspnea and is admitted medically for cellulitis of the leg and UTI.  Psychiatry is consulted due to history of anxiety and current anxiety and tearfulness in ED as a result of her medical admission.    Originally presented disheveled and unkempt with high anxiety, paranoia, and delusions of persecution from her , suspected olfactory hallucinations related to perfumes and fear of death from medical illness. Today, pt was cooperative with interview but seemed depressed compared to yesterday and continues to insist that she is ready to go home. Also with ongoing olfactory hallucinations and persecutory delusions. Potential transfer to [facility] today pending C diff results s/p new onset of diarrhea.     Recommendations:  - c/w zyprexa to 12.5mg PO Q HS  - c/w depakote 250mg PO BID  - c/w ativan 0.5mg PO Q 6 hours PRN anxiety  - LFT wnl for now, cont to trend LFTs  - QTC <500 for now, order EKG  - 2PCs papers signed by

## 2019-12-25 LAB
ANION GAP SERPL CALC-SCNC: 12 MMOL/L — SIGNIFICANT CHANGE UP (ref 5–17)
BUN SERPL-MCNC: 7 MG/DL — SIGNIFICANT CHANGE UP (ref 7–23)
CALCIUM SERPL-MCNC: 9.6 MG/DL — SIGNIFICANT CHANGE UP (ref 8.4–10.5)
CHLORIDE SERPL-SCNC: 103 MMOL/L — SIGNIFICANT CHANGE UP (ref 96–108)
CO2 SERPL-SCNC: 25 MMOL/L — SIGNIFICANT CHANGE UP (ref 22–31)
CREAT SERPL-MCNC: 0.46 MG/DL — LOW (ref 0.5–1.3)
GLUCOSE SERPL-MCNC: 88 MG/DL — SIGNIFICANT CHANGE UP (ref 70–99)
HCT VFR BLD CALC: 31.6 % — LOW (ref 34.5–45)
HGB BLD-MCNC: 9.5 G/DL — LOW (ref 11.5–15.5)
MAGNESIUM SERPL-MCNC: 2 MG/DL — SIGNIFICANT CHANGE UP (ref 1.6–2.6)
MCHC RBC-ENTMCNC: 27.6 PG — SIGNIFICANT CHANGE UP (ref 27–34)
MCHC RBC-ENTMCNC: 30.1 GM/DL — LOW (ref 32–36)
MCV RBC AUTO: 91.9 FL — SIGNIFICANT CHANGE UP (ref 80–100)
PLATELET # BLD AUTO: 140 K/UL — LOW (ref 150–400)
POTASSIUM SERPL-MCNC: 3.7 MMOL/L — SIGNIFICANT CHANGE UP (ref 3.5–5.3)
POTASSIUM SERPL-SCNC: 3.7 MMOL/L — SIGNIFICANT CHANGE UP (ref 3.5–5.3)
RBC # BLD: 3.44 M/UL — LOW (ref 3.8–5.2)
RBC # FLD: 15.7 % — HIGH (ref 10.3–14.5)
SODIUM SERPL-SCNC: 140 MMOL/L — SIGNIFICANT CHANGE UP (ref 135–145)
WBC # BLD: 7.08 K/UL — SIGNIFICANT CHANGE UP (ref 3.8–10.5)
WBC # FLD AUTO: 7.08 K/UL — SIGNIFICANT CHANGE UP (ref 3.8–10.5)

## 2019-12-25 RX ORDER — POTASSIUM CHLORIDE 20 MEQ
10 PACKET (EA) ORAL ONCE
Refills: 0 | Status: COMPLETED | OUTPATIENT
Start: 2019-12-25 | End: 2019-12-25

## 2019-12-25 RX ADMIN — MONTELUKAST 10 MILLIGRAM(S): 4 TABLET, CHEWABLE ORAL at 12:46

## 2019-12-25 RX ADMIN — Medication 125 MICROGRAM(S): at 04:23

## 2019-12-25 RX ADMIN — OXYCODONE HYDROCHLORIDE 5 MILLIGRAM(S): 5 TABLET ORAL at 13:36

## 2019-12-25 RX ADMIN — Medication 100 MILLIEQUIVALENT(S): at 04:13

## 2019-12-25 RX ADMIN — PREGABALIN 1000 MICROGRAM(S): 225 CAPSULE ORAL at 12:46

## 2019-12-25 RX ADMIN — LOSARTAN POTASSIUM 100 MILLIGRAM(S): 100 TABLET, FILM COATED ORAL at 04:24

## 2019-12-25 RX ADMIN — Medication 0.5 MILLIGRAM(S): at 05:21

## 2019-12-25 RX ADMIN — LIDOCAINE 1 PATCH: 4 CREAM TOPICAL at 00:57

## 2019-12-25 RX ADMIN — ATORVASTATIN CALCIUM 40 MILLIGRAM(S): 80 TABLET, FILM COATED ORAL at 21:44

## 2019-12-25 RX ADMIN — Medication 3 MILLILITER(S): at 16:09

## 2019-12-25 RX ADMIN — Medication 5 MILLIGRAM(S): at 21:44

## 2019-12-25 RX ADMIN — LIDOCAINE 1 PATCH: 4 CREAM TOPICAL at 12:46

## 2019-12-25 RX ADMIN — OLANZAPINE 12.5 MILLIGRAM(S): 15 TABLET, FILM COATED ORAL at 21:44

## 2019-12-25 RX ADMIN — Medication 2 MILLIGRAM(S): at 04:24

## 2019-12-25 RX ADMIN — OXYCODONE HYDROCHLORIDE 5 MILLIGRAM(S): 5 TABLET ORAL at 12:46

## 2019-12-25 RX ADMIN — Medication 3 MILLIGRAM(S): at 21:44

## 2019-12-25 RX ADMIN — LIDOCAINE 1 PATCH: 4 CREAM TOPICAL at 23:50

## 2019-12-25 RX ADMIN — GABAPENTIN 100 MILLIGRAM(S): 400 CAPSULE ORAL at 04:24

## 2019-12-25 RX ADMIN — OXYCODONE HYDROCHLORIDE 5 MILLIGRAM(S): 5 TABLET ORAL at 00:48

## 2019-12-25 RX ADMIN — Medication 0.5 MILLIGRAM(S): at 16:56

## 2019-12-25 RX ADMIN — Medication 1 MILLIGRAM(S): at 12:46

## 2019-12-25 RX ADMIN — OXYCODONE HYDROCHLORIDE 5 MILLIGRAM(S): 5 TABLET ORAL at 04:30

## 2019-12-25 RX ADMIN — Medication 5 MILLIGRAM(S): at 04:22

## 2019-12-25 RX ADMIN — DIVALPROEX SODIUM 250 MILLIGRAM(S): 500 TABLET, DELAYED RELEASE ORAL at 09:09

## 2019-12-25 RX ADMIN — Medication 2000 UNIT(S): at 12:46

## 2019-12-25 RX ADMIN — GABAPENTIN 100 MILLIGRAM(S): 400 CAPSULE ORAL at 21:44

## 2019-12-25 RX ADMIN — Medication 0.5 MILLIGRAM(S): at 09:08

## 2019-12-25 RX ADMIN — GABAPENTIN 100 MILLIGRAM(S): 400 CAPSULE ORAL at 15:25

## 2019-12-25 RX ADMIN — Medication 1 SPRAY(S): at 04:23

## 2019-12-25 RX ADMIN — Medication 5 MILLIGRAM(S): at 15:25

## 2019-12-25 RX ADMIN — PANTOPRAZOLE SODIUM 40 MILLIGRAM(S): 20 TABLET, DELAYED RELEASE ORAL at 09:08

## 2019-12-25 RX ADMIN — Medication 2 MILLIGRAM(S): at 17:53

## 2019-12-25 RX ADMIN — AMLODIPINE BESYLATE 2.5 MILLIGRAM(S): 2.5 TABLET ORAL at 04:22

## 2019-12-25 RX ADMIN — DIVALPROEX SODIUM 250 MILLIGRAM(S): 500 TABLET, DELAYED RELEASE ORAL at 21:44

## 2019-12-25 RX ADMIN — LIDOCAINE 1 PATCH: 4 CREAM TOPICAL at 19:27

## 2019-12-25 RX ADMIN — ENOXAPARIN SODIUM 40 MILLIGRAM(S): 100 INJECTION SUBCUTANEOUS at 12:46

## 2019-12-25 RX ADMIN — OXYCODONE HYDROCHLORIDE 5 MILLIGRAM(S): 5 TABLET ORAL at 07:29

## 2019-12-25 RX ADMIN — CLOPIDOGREL BISULFATE 75 MILLIGRAM(S): 75 TABLET, FILM COATED ORAL at 12:46

## 2019-12-25 NOTE — PROGRESS NOTE ADULT - ASSESSMENT
_________________________________________________________________________________________  ========>>  M E D I C A L   A T T E N D I N G    F O L L O W  U P  N O T E  <<=========  -----------------------------------------------------------------------------------------------------    - Patient seen and examined by me earlier today.   - In summary,  SHEKHAR VASQUEZ is a 69y year old woman who originally presented with leg infection   - Patient today overall doing fairly   no more diarrhea reported today at all pe pt     ==================>> REVIEW OF SYSTEM <<=================    GEN: no fever, no chills  RESP: reports occasional SOB now, no cough, no sputum  CVS: no chest pain now, no palpitations, + edema, unchanged   GI: no abdominal pain, no nausea  : no dysuria, no frequency, no hematuria  Neuro: no headache, no dizziness  Derm : no itching, no rash    ==================>> PHYSICAL EXAM <<=================    GEN: A&O X 3 , NAD , comfortable in recliner with legs down   HEENT: NCAT, PERRL, MMM, hearing intact  Neck: supple , no JVD  CVS: S1S2 , regular , No M/R/G appreciated  PULM: CTA B/L,  no W/R/R appreciated  ABD.: soft. non tender, non distended,  bowel sounds present  Extrem: intact pulses , + Bilateral LE edema stable        wound on left leg dressed   PSYCH :  less anxious       ==================>> MEDICATIONS <<====================    ALBUTerol    90 MICROgram(s) HFA Inhaler 1 Puff(s) Inhalation every 4 hours  amLODIPine   Tablet 2.5 milliGRAM(s) Oral daily  atorvastatin 40 milliGRAM(s) Oral at bedtime  baclofen 5 milliGRAM(s) Oral three times a day  cholecalciferol 2000 Unit(s) Oral daily  clopidogrel Tablet 75 milliGRAM(s) Oral daily  cyanocobalamin 1000 MICROGram(s) Oral daily  diVALproex  milliGRAM(s) Oral two times a day  enoxaparin Injectable 40 milliGRAM(s) SubCutaneous daily  fluticasone propionate 50 MICROgram(s)/spray Nasal Spray 1 Spray(s) Both Nostrils two times a day  folic acid 1 milliGRAM(s) Oral daily  gabapentin 100 milliGRAM(s) Oral three times a day  levothyroxine 125 MICROGram(s) Oral daily  lidocaine   Patch 1 Patch Transdermal daily  losartan 100 milliGRAM(s) Oral daily  melatonin 3 milliGRAM(s) Oral at bedtime  montelukast 10 milliGRAM(s) Oral daily  OLANZapine 12.5 milliGRAM(s) Oral at bedtime  pantoprazole    Tablet 40 milliGRAM(s) Oral before breakfast  tiotropium 18 MICROgram(s) Capsule 1 Capsule(s) Inhalation daily  trihexyphenidyl 2 milliGRAM(s) Oral two times a day    MEDICATIONS  (PRN):  acetaminophen   Tablet .. 650 milliGRAM(s) Oral every 6 hours PRN Temp greater or equal to 38C (100.4F)  albuterol/ipratropium for Nebulization 3 milliLiter(s) Nebulizer every 6 hours PRN Shortness of Breath and/or Wheezing  LORazepam     Tablet 0.5 milliGRAM(s) Oral every 4 hours PRN anxiety or agitation  oxyCODONE    IR 5 milliGRAM(s) Oral every 6 hours PRN moderate or severe pain    ==================>> VITAL SIGNS <<==================    Vital Signs Last 24 Hrs  T(C): 37.5 (12-25-19 @ 14:51)  T(F): 99.5 (12-25-19 @ 14:51), Max: 99.5 (12-25-19 @ 14:51)  HR: 89 (12-25-19 @ 14:51) (76 - 89)  BP: 115/64 (12-25-19 @ 14:51)  RR: 18 (12-25-19 @ 14:51) (18 - 19)  SpO2: 95% (12-25-19 @ 14:51) (95% - 100%)       ==================>> LAB AND IMAGING <<==================                        9.5    7.08  )-----------( 140      ( 25 Dec 2019 08:40 )             31.6        140  |  103  |  7   ----------------------------<  88  3.7   |  25  |  0.46<L>    Ca    9.6      25 Dec 2019 07:34  Mg     2.0     12-25    WBC count:   7.08 <<== ,  7.45 <<== ,  8.69 <<== ,  9.65 <<==   Hemoglobin:   9.5 <<==,  9.4 <<==,  8.9 <<==,  10.0 <<==  platelets:  140 <==, 143 <==, 111 <==, 105 <==, 97 <==    Creatinine:  0.46  <<==, 0.46  <<==, 0.51  <<==, 0.55  <<==  Sodium:   140  <==, 141  <==, 139  <==, 139  <==    ___________________________________________________________________________________  ===============>>  A S S E S S M E N T   A N D   P L A N <<===============  ------------------------------------------------------------------------------------------    · Assessment		  69 F PMH  Anxiety, HTN, HLD , NOEMY, asthma, COPD, DVT no longer on a/c , remote Breast Cancer, Kidney cancer  in remission , Graves disease, Hypothyroid , S/P laminectomy , carpal tunnel syndrome, Obesity, multiple admissions for sob in past attributed to anxiety/panic attacks, now p/w b/l LE edema and pain.      Problem/Plan - 1:  ·  Problem: Cellulitis of lower extremity / infected left leg wound > treated   --ID  appreciated   -finished  abx >> DCed  local wound care    treat edema with diuretics   leg elevation reiterated   ACE wrapping daily     pt now with diarrhea post abx treatment  Cdiff  was negative     imodium as needed ( for now has improved)      pt otherwise without symptoms / pain..     Problem/Plan - 2:  ·  Problem:  Bacteriuria  now post fever  -ID appreciated   - finished abx >> observe off    Problem/Plan - 3:  ·  Problem:psych disorder  - psych following  - Continue Current medications and monitor.   - psych f/u and re-eval for need for inpatient psych transfer given overall improvement     pt medically cleared for transfer as needed     Problem/Plan - 4:  ·  Problem: Other chronic pain.  Plan: -c/w baclofen  - Continue Current medications and monitor.     Problem/Plan - 5:  ·  Problem: Debility  - PT / OOB as able   -fall precautions     Problem/Plan - 6:  Problem: Essential hypertension.  -c/w home losartan, norvasc.  - cardio following     Problem/Plan - 7:  ·  Problem: Prophylactic measure.  Plan: -lovenox vte ppx.    keep legs elevated     --------------------------------------------  Case discussed with pt  Education given on findings and plan of care  ___________________________  H. JANICE Welch.  Pager: [phone]

## 2019-12-25 NOTE — PROGRESS NOTE ADULT - SUBJECTIVE AND OBJECTIVE BOX
Cardiovascular Disease Progress Note    Overnight events: No acute events overnight.  feeling better. diarrhea resolved. no sob  Otherwise review of systems negative    Objective Findings:  T(C): 36.8 (12-25-19 @ 05:24), Max: 37 (12-24-19 @ 21:15)  HR: 76 (12-25-19 @ 05:24) (76 - 77)  BP: 143/- (12-25-19 @ 05:24) (136/70 - 145/73)  RR: 18 (12-25-19 @ 05:24) (18 - 19)  SpO2: 100% (12-25-19 @ 05:24) (96% - 100%)  Wt(kg): --  Daily     Daily       Physical Exam:  Gen: NAD  HEENT: EOMI  CV: RRR, normal S1 + S2, no m/r/g  Lungs: CTAB  Abd: soft, non-tender  Ext: No edema      Laboratory Data:                        9.5    7.08  )-----------( 140      ( 25 Dec 2019 08:40 )             31.6     12-25    140  |  103  |  7   ----------------------------<  88  3.7   |  25  |  0.46<L>    Ca    9.6      25 Dec 2019 07:34  Mg     2.0     12-25                Inpatient Medications:  MEDICATIONS  (STANDING):  ALBUTerol    90 MICROgram(s) HFA Inhaler 1 Puff(s) Inhalation every 4 hours  amLODIPine   Tablet 2.5 milliGRAM(s) Oral daily  atorvastatin 40 milliGRAM(s) Oral at bedtime  baclofen 5 milliGRAM(s) Oral three times a day  cholecalciferol 2000 Unit(s) Oral daily  clopidogrel Tablet 75 milliGRAM(s) Oral daily  cyanocobalamin 1000 MICROGram(s) Oral daily  diVALproex  milliGRAM(s) Oral two times a day  enoxaparin Injectable 40 milliGRAM(s) SubCutaneous daily  fluticasone propionate 50 MICROgram(s)/spray Nasal Spray 1 Spray(s) Both Nostrils two times a day  folic acid 1 milliGRAM(s) Oral daily  gabapentin 100 milliGRAM(s) Oral three times a day  levothyroxine 125 MICROGram(s) Oral daily  lidocaine   Patch 1 Patch Transdermal daily  losartan 100 milliGRAM(s) Oral daily  melatonin 3 milliGRAM(s) Oral at bedtime  montelukast 10 milliGRAM(s) Oral daily  OLANZapine 12.5 milliGRAM(s) Oral at bedtime  pantoprazole    Tablet 40 milliGRAM(s) Oral before breakfast  tiotropium 18 MICROgram(s) Capsule 1 Capsule(s) Inhalation daily  trihexyphenidyl 2 milliGRAM(s) Oral two times a day      Assessment:  -sob  -le cellulitis  -atypical cp s/p recent LHC with normal cors  -venous insufficiency  -copd/asthma  -obesity  -melba  -severe anxiety  -hypothyroidism    Recs:  -c/w home antihypertensives and diuretics. appears euvolemic  -c/w bronchodilators  -psychiatry recs appreciated. dosing of anti-psychotics per psych. would probably benefit from inpatient psychiatry if agreeable due to her multiple hospitalizations and inability to care for herself in setting of delusional disorder etc. suspect she will not follow up with psych as an outpatient  -keep legs elevated, compression stockings and c/w diuretic. unclear why now on plavix. would attempt to clarify  -f/u ID. s/p merrem for tx esbl in urine  -DVT ppx  -f/u stool studies  -dispo planning      Over 25 minutes spent on total encounter; more than 50% of the visit was spent counseling and/or coordinating care by the attending physician.      Juan Salcedo MD   Cardiovascular Disease  [phone]

## 2019-12-26 ENCOUNTER — INPATIENT (INPATIENT)
Facility: HOSPITAL | Age: 69
LOS: 34 days | Discharge: HOME | End: 2020-01-30
Attending: PSYCHIATRY & NEUROLOGY | Admitting: PSYCHIATRY & NEUROLOGY
Payer: MEDICARE

## 2019-12-26 VITALS — TEMPERATURE: 99 F | HEIGHT: 60 IN | WEIGHT: 165.35 LBS | OXYGEN SATURATION: 95 %

## 2019-12-26 VITALS
TEMPERATURE: 99 F | DIASTOLIC BLOOD PRESSURE: 71 MMHG | OXYGEN SATURATION: 95 % | HEART RATE: 100 BPM | SYSTOLIC BLOOD PRESSURE: 126 MMHG | RESPIRATION RATE: 18 BRPM

## 2019-12-26 DIAGNOSIS — F29 UNSPECIFIED PSYCHOSIS NOT DUE TO A SUBSTANCE OR KNOWN PHYSIOLOGICAL CONDITION: ICD-10-CM

## 2019-12-26 DIAGNOSIS — Z98.89 OTHER SPECIFIED POSTPROCEDURAL STATES: Chronic | ICD-10-CM

## 2019-12-26 DIAGNOSIS — Z90.49 ACQUIRED ABSENCE OF OTHER SPECIFIED PARTS OF DIGESTIVE TRACT: Chronic | ICD-10-CM

## 2019-12-26 DIAGNOSIS — Z90.5 ACQUIRED ABSENCE OF KIDNEY: Chronic | ICD-10-CM

## 2019-12-26 LAB
ANION GAP SERPL CALC-SCNC: 13 MMOL/L — SIGNIFICANT CHANGE UP (ref 5–17)
BUN SERPL-MCNC: 6 MG/DL — LOW (ref 7–23)
CALCIUM SERPL-MCNC: 10 MG/DL — SIGNIFICANT CHANGE UP (ref 8.4–10.5)
CHLORIDE SERPL-SCNC: 102 MMOL/L — SIGNIFICANT CHANGE UP (ref 96–108)
CO2 SERPL-SCNC: 28 MMOL/L — SIGNIFICANT CHANGE UP (ref 22–31)
CREAT SERPL-MCNC: 0.48 MG/DL — LOW (ref 0.5–1.3)
GLUCOSE SERPL-MCNC: 93 MG/DL — SIGNIFICANT CHANGE UP (ref 70–99)
HCT VFR BLD CALC: 33.5 % — LOW (ref 34.5–45)
HGB BLD-MCNC: 10.1 G/DL — LOW (ref 11.5–15.5)
MCHC RBC-ENTMCNC: 27.4 PG — SIGNIFICANT CHANGE UP (ref 27–34)
MCHC RBC-ENTMCNC: 30.1 GM/DL — LOW (ref 32–36)
MCV RBC AUTO: 91 FL — SIGNIFICANT CHANGE UP (ref 80–100)
PLATELET # BLD AUTO: 171 K/UL — SIGNIFICANT CHANGE UP (ref 150–400)
POTASSIUM SERPL-MCNC: 3.3 MMOL/L — LOW (ref 3.5–5.3)
POTASSIUM SERPL-SCNC: 3.3 MMOL/L — LOW (ref 3.5–5.3)
RBC # BLD: 3.68 M/UL — LOW (ref 3.8–5.2)
RBC # FLD: 15.6 % — HIGH (ref 10.3–14.5)
SODIUM SERPL-SCNC: 143 MMOL/L — SIGNIFICANT CHANGE UP (ref 135–145)
WBC # BLD: 7.29 K/UL — SIGNIFICANT CHANGE UP (ref 3.8–10.5)
WBC # FLD AUTO: 7.29 K/UL — SIGNIFICANT CHANGE UP (ref 3.8–10.5)

## 2019-12-26 PROCEDURE — 80053 COMPREHEN METABOLIC PANEL: CPT

## 2019-12-26 PROCEDURE — 87324 CLOSTRIDIUM AG IA: CPT

## 2019-12-26 PROCEDURE — 93005 ELECTROCARDIOGRAM TRACING: CPT

## 2019-12-26 PROCEDURE — 82330 ASSAY OF CALCIUM: CPT

## 2019-12-26 PROCEDURE — 99285 EMERGENCY DEPT VISIT HI MDM: CPT | Mod: 25

## 2019-12-26 PROCEDURE — 80048 BASIC METABOLIC PNL TOTAL CA: CPT

## 2019-12-26 PROCEDURE — 82435 ASSAY OF BLOOD CHLORIDE: CPT

## 2019-12-26 PROCEDURE — 73590 X-RAY EXAM OF LOWER LEG: CPT

## 2019-12-26 PROCEDURE — 83036 HEMOGLOBIN GLYCOSYLATED A1C: CPT

## 2019-12-26 PROCEDURE — 97162 PT EVAL MOD COMPLEX 30 MIN: CPT

## 2019-12-26 PROCEDURE — 85730 THROMBOPLASTIN TIME PARTIAL: CPT

## 2019-12-26 PROCEDURE — 99232 SBSQ HOSP IP/OBS MODERATE 35: CPT | Mod: GC

## 2019-12-26 PROCEDURE — 85610 PROTHROMBIN TIME: CPT

## 2019-12-26 PROCEDURE — 87040 BLOOD CULTURE FOR BACTERIA: CPT

## 2019-12-26 PROCEDURE — 86140 C-REACTIVE PROTEIN: CPT

## 2019-12-26 PROCEDURE — 82803 BLOOD GASES ANY COMBINATION: CPT

## 2019-12-26 PROCEDURE — 82550 ASSAY OF CK (CPK): CPT

## 2019-12-26 PROCEDURE — 97530 THERAPEUTIC ACTIVITIES: CPT

## 2019-12-26 PROCEDURE — 82947 ASSAY GLUCOSE BLOOD QUANT: CPT

## 2019-12-26 PROCEDURE — 70450 CT HEAD/BRAIN W/O DYE: CPT

## 2019-12-26 PROCEDURE — 85652 RBC SED RATE AUTOMATED: CPT

## 2019-12-26 PROCEDURE — 84132 ASSAY OF SERUM POTASSIUM: CPT

## 2019-12-26 PROCEDURE — 93970 EXTREMITY STUDY: CPT

## 2019-12-26 PROCEDURE — 83880 ASSAY OF NATRIURETIC PEPTIDE: CPT

## 2019-12-26 PROCEDURE — 84484 ASSAY OF TROPONIN QUANT: CPT

## 2019-12-26 PROCEDURE — 87086 URINE CULTURE/COLONY COUNT: CPT

## 2019-12-26 PROCEDURE — 80307 DRUG TEST PRSMV CHEM ANLYZR: CPT

## 2019-12-26 PROCEDURE — 81001 URINALYSIS AUTO W/SCOPE: CPT

## 2019-12-26 PROCEDURE — 97110 THERAPEUTIC EXERCISES: CPT

## 2019-12-26 PROCEDURE — 84295 ASSAY OF SERUM SODIUM: CPT

## 2019-12-26 PROCEDURE — 84100 ASSAY OF PHOSPHORUS: CPT

## 2019-12-26 PROCEDURE — 80202 ASSAY OF VANCOMYCIN: CPT

## 2019-12-26 PROCEDURE — 83605 ASSAY OF LACTIC ACID: CPT

## 2019-12-26 PROCEDURE — 85027 COMPLETE CBC AUTOMATED: CPT

## 2019-12-26 PROCEDURE — 85014 HEMATOCRIT: CPT

## 2019-12-26 PROCEDURE — 96374 THER/PROPH/DIAG INJ IV PUSH: CPT

## 2019-12-26 PROCEDURE — 87449 NOS EACH ORGANISM AG IA: CPT

## 2019-12-26 PROCEDURE — 83735 ASSAY OF MAGNESIUM: CPT

## 2019-12-26 PROCEDURE — 71045 X-RAY EXAM CHEST 1 VIEW: CPT

## 2019-12-26 PROCEDURE — 87186 SC STD MICRODIL/AGAR DIL: CPT

## 2019-12-26 PROCEDURE — 94640 AIRWAY INHALATION TREATMENT: CPT

## 2019-12-26 RX ORDER — CLOPIDOGREL BISULFATE 75 MG/1
75 TABLET, FILM COATED ORAL DAILY
Refills: 0 | Status: DISCONTINUED | OUTPATIENT
Start: 2019-12-26 | End: 2020-01-30

## 2019-12-26 RX ORDER — ATORVASTATIN CALCIUM 80 MG/1
40 TABLET, FILM COATED ORAL AT BEDTIME
Refills: 0 | Status: DISCONTINUED | OUTPATIENT
Start: 2019-12-26 | End: 2020-01-30

## 2019-12-26 RX ORDER — OXYCODONE HYDROCHLORIDE 5 MG/1
5 TABLET ORAL
Refills: 0 | Status: DISCONTINUED | OUTPATIENT
Start: 2019-12-26 | End: 2019-12-31

## 2019-12-26 RX ORDER — PREGABALIN 225 MG/1
1000 CAPSULE ORAL DAILY
Refills: 0 | Status: DISCONTINUED | OUTPATIENT
Start: 2019-12-26 | End: 2020-01-30

## 2019-12-26 RX ORDER — TRIHEXYPHENIDYL HCL 2 MG
2 TABLET ORAL
Refills: 0 | Status: DISCONTINUED | OUTPATIENT
Start: 2019-12-26 | End: 2019-12-30

## 2019-12-26 RX ORDER — ACETAMINOPHEN 500 MG
650 TABLET ORAL EVERY 6 HOURS
Refills: 0 | Status: DISCONTINUED | OUTPATIENT
Start: 2019-12-26 | End: 2020-01-18

## 2019-12-26 RX ORDER — PANTOPRAZOLE SODIUM 20 MG/1
40 TABLET, DELAYED RELEASE ORAL
Refills: 0 | Status: DISCONTINUED | OUTPATIENT
Start: 2019-12-26 | End: 2020-01-30

## 2019-12-26 RX ORDER — GABAPENTIN 400 MG/1
100 CAPSULE ORAL THREE TIMES A DAY
Refills: 0 | Status: DISCONTINUED | OUTPATIENT
Start: 2019-12-26 | End: 2019-12-31

## 2019-12-26 RX ORDER — MONTELUKAST 4 MG/1
10 TABLET, CHEWABLE ORAL DAILY
Refills: 0 | Status: DISCONTINUED | OUTPATIENT
Start: 2019-12-26 | End: 2020-01-30

## 2019-12-26 RX ORDER — LIDOCAINE 4 G/100G
1 CREAM TOPICAL DAILY
Refills: 0 | Status: DISCONTINUED | OUTPATIENT
Start: 2019-12-26 | End: 2020-01-30

## 2019-12-26 RX ORDER — HALOPERIDOL DECANOATE 100 MG/ML
0.5 INJECTION INTRAMUSCULAR EVERY 6 HOURS
Refills: 0 | Status: DISCONTINUED | OUTPATIENT
Start: 2019-12-26 | End: 2019-12-31

## 2019-12-26 RX ORDER — DIVALPROEX SODIUM 500 MG/1
250 TABLET, DELAYED RELEASE ORAL
Refills: 0 | Status: DISCONTINUED | OUTPATIENT
Start: 2019-12-26 | End: 2019-12-30

## 2019-12-26 RX ORDER — OLANZAPINE 15 MG/1
12.5 TABLET, FILM COATED ORAL AT BEDTIME
Refills: 0 | Status: DISCONTINUED | OUTPATIENT
Start: 2019-12-26 | End: 2019-12-27

## 2019-12-26 RX ORDER — CHOLECALCIFEROL (VITAMIN D3) 125 MCG
2000 CAPSULE ORAL DAILY
Refills: 0 | Status: DISCONTINUED | OUTPATIENT
Start: 2019-12-26 | End: 2020-01-30

## 2019-12-26 RX ORDER — FLUTICASONE PROPIONATE 50 MCG
1 SPRAY, SUSPENSION NASAL
Refills: 0 | Status: DISCONTINUED | OUTPATIENT
Start: 2019-12-26 | End: 2020-01-22

## 2019-12-26 RX ORDER — HALOPERIDOL DECANOATE 100 MG/ML
0.5 INJECTION INTRAMUSCULAR ONCE
Refills: 0 | Status: DISCONTINUED | OUTPATIENT
Start: 2019-12-26 | End: 2020-01-30

## 2019-12-26 RX ORDER — LEVOTHYROXINE SODIUM 125 MCG
125 TABLET ORAL DAILY
Refills: 0 | Status: DISCONTINUED | OUTPATIENT
Start: 2019-12-26 | End: 2020-01-30

## 2019-12-26 RX ORDER — IPRATROPIUM/ALBUTEROL SULFATE 18-103MCG
3 AEROSOL WITH ADAPTER (GRAM) INHALATION EVERY 6 HOURS
Refills: 0 | Status: DISCONTINUED | OUTPATIENT
Start: 2019-12-26 | End: 2020-01-30

## 2019-12-26 RX ORDER — BACLOFEN 100 %
5 POWDER (GRAM) MISCELLANEOUS THREE TIMES A DAY
Refills: 0 | Status: DISCONTINUED | OUTPATIENT
Start: 2019-12-26 | End: 2020-01-30

## 2019-12-26 RX ORDER — AMLODIPINE BESYLATE 2.5 MG/1
2.5 TABLET ORAL DAILY
Refills: 0 | Status: DISCONTINUED | OUTPATIENT
Start: 2019-12-26 | End: 2020-01-14

## 2019-12-26 RX ORDER — LANOLIN ALCOHOL/MO/W.PET/CERES
3 CREAM (GRAM) TOPICAL AT BEDTIME
Refills: 0 | Status: DISCONTINUED | OUTPATIENT
Start: 2019-12-26 | End: 2020-01-30

## 2019-12-26 RX ORDER — FOLIC ACID 0.8 MG
1 TABLET ORAL DAILY
Refills: 0 | Status: DISCONTINUED | OUTPATIENT
Start: 2019-12-26 | End: 2020-01-22

## 2019-12-26 RX ORDER — LOSARTAN POTASSIUM 100 MG/1
100 TABLET, FILM COATED ORAL DAILY
Refills: 0 | Status: DISCONTINUED | OUTPATIENT
Start: 2019-12-26 | End: 2020-01-14

## 2019-12-26 RX ADMIN — MONTELUKAST 10 MILLIGRAM(S): 4 TABLET, CHEWABLE ORAL at 12:05

## 2019-12-26 RX ADMIN — OLANZAPINE 12.5 MILLIGRAM(S): 15 TABLET, FILM COATED ORAL at 20:09

## 2019-12-26 RX ADMIN — HALOPERIDOL DECANOATE 0.5 MILLIGRAM(S): 100 INJECTION INTRAMUSCULAR at 21:35

## 2019-12-26 RX ADMIN — Medication 0.5 MILLIGRAM(S): at 08:35

## 2019-12-26 RX ADMIN — Medication 0.5 MILLIGRAM(S): at 12:03

## 2019-12-26 RX ADMIN — GABAPENTIN 100 MILLIGRAM(S): 400 CAPSULE ORAL at 14:18

## 2019-12-26 RX ADMIN — Medication 125 MICROGRAM(S): at 05:49

## 2019-12-26 RX ADMIN — DIVALPROEX SODIUM 250 MILLIGRAM(S): 500 TABLET, DELAYED RELEASE ORAL at 10:18

## 2019-12-26 RX ADMIN — DIVALPROEX SODIUM 250 MILLIGRAM(S): 500 TABLET, DELAYED RELEASE ORAL at 20:09

## 2019-12-26 RX ADMIN — Medication 5 MILLIGRAM(S): at 20:13

## 2019-12-26 RX ADMIN — Medication 3 MILLILITER(S): at 05:55

## 2019-12-26 RX ADMIN — OXYCODONE HYDROCHLORIDE 5 MILLIGRAM(S): 5 TABLET ORAL at 09:22

## 2019-12-26 RX ADMIN — PREGABALIN 1000 MICROGRAM(S): 225 CAPSULE ORAL at 12:04

## 2019-12-26 RX ADMIN — Medication 0.5 MILLIGRAM(S): at 19:09

## 2019-12-26 RX ADMIN — LOSARTAN POTASSIUM 100 MILLIGRAM(S): 100 TABLET, FILM COATED ORAL at 05:48

## 2019-12-26 RX ADMIN — ENOXAPARIN SODIUM 40 MILLIGRAM(S): 100 INJECTION SUBCUTANEOUS at 12:05

## 2019-12-26 RX ADMIN — Medication 2 MILLIGRAM(S): at 05:47

## 2019-12-26 RX ADMIN — Medication 2 MILLIGRAM(S): at 20:10

## 2019-12-26 RX ADMIN — OXYCODONE HYDROCHLORIDE 5 MILLIGRAM(S): 5 TABLET ORAL at 08:35

## 2019-12-26 RX ADMIN — Medication 2000 UNIT(S): at 12:05

## 2019-12-26 RX ADMIN — Medication 0.5 MILLIGRAM(S): at 00:05

## 2019-12-26 RX ADMIN — GABAPENTIN 100 MILLIGRAM(S): 400 CAPSULE ORAL at 20:09

## 2019-12-26 RX ADMIN — CLOPIDOGREL BISULFATE 75 MILLIGRAM(S): 75 TABLET, FILM COATED ORAL at 12:05

## 2019-12-26 RX ADMIN — Medication 3 MILLIGRAM(S): at 20:09

## 2019-12-26 RX ADMIN — ATORVASTATIN CALCIUM 40 MILLIGRAM(S): 80 TABLET, FILM COATED ORAL at 20:10

## 2019-12-26 RX ADMIN — AMLODIPINE BESYLATE 2.5 MILLIGRAM(S): 2.5 TABLET ORAL at 05:49

## 2019-12-26 RX ADMIN — LIDOCAINE 1 PATCH: 4 CREAM TOPICAL at 12:05

## 2019-12-26 RX ADMIN — Medication 1 MILLIGRAM(S): at 12:04

## 2019-12-26 RX ADMIN — Medication 3 MILLILITER(S): at 21:25

## 2019-12-26 RX ADMIN — Medication 0.5 MILLIGRAM(S): at 15:21

## 2019-12-26 RX ADMIN — Medication 1 SPRAY(S): at 20:09

## 2019-12-26 RX ADMIN — PANTOPRAZOLE SODIUM 40 MILLIGRAM(S): 20 TABLET, DELAYED RELEASE ORAL at 05:49

## 2019-12-26 RX ADMIN — Medication 5 MILLIGRAM(S): at 14:18

## 2019-12-26 RX ADMIN — GABAPENTIN 100 MILLIGRAM(S): 400 CAPSULE ORAL at 05:48

## 2019-12-26 RX ADMIN — Medication 5 MILLIGRAM(S): at 05:47

## 2019-12-26 NOTE — PROGRESS NOTE BEHAVIORAL HEALTH - BODY HABITUS
Well nourished/Obese
Well nourished/Obese
Obese/Well nourished
Well nourished/Obese

## 2019-12-26 NOTE — PROGRESS NOTE BEHAVIORAL HEALTH - NS ED BHA MED ROS MUSCULOSKELETAL
No complaints
Yes

## 2019-12-26 NOTE — PROGRESS NOTE BEHAVIORAL HEALTH - NSBHFUPINTERVALCCFT_PSY_A_CORE
"I better, yes, yes."
"Doing great."
"Don't feel so well."
"He is a liar"
"I feel excellent!"
"I feel good, but I am scared."
"I feel good, good."
"I feel good."

## 2019-12-26 NOTE — PROGRESS NOTE BEHAVIORAL HEALTH - PERCEPTIONS
Olfactory hallucinations

## 2019-12-26 NOTE — PROGRESS NOTE BEHAVIORAL HEALTH - SUMMARY
69 F with PPHX of delusional disorder and anxiety disorder with multiple psychiatric admissions and PMH  Anxiety, minimally ambulatory with use of walker,  HTN, HLD , NOEMY, asthma, COPD, DVT no longer on a/c , remote Breast Cancer, Kidney cancer  in remission , Graves disease, Hypothyroid , S/P laminectomy , carpal tunnel syndrome, Obesity, multiple admissions for sob in past attributed to anxiety/panic attacks, now p/w b/l LE edema and pain.   She complains of dyspnea and is admitted medically for cellulitis of the leg and UTI.  Psychiatry is consulted due to history of anxiety and current anxiety and tearfulness in ED as a result of her medical admission.    Originally presented disheveled and unkempt with high anxiety, paranoia, and delusions of persecution from her , suspected olfactory hallucinations related to perfumes and fear of death from medical illness. Today, pt was calm and cooperative with interview, with brighter affect than on previous interview, but with ongoing olfactory hallucinations and persecutory delusions. Pt did not remember speaking with interviewer re transfer to [facility]. Transfer to [facility] inpt unit pending resolution of diarrhea. Family in agreement with psych admission for stabilization and safety concerns.     Recommendations:  - c/w zyprexa to 12.5mg PO Q HS  - c/w depakote 250mg PO BID  - c/w ativan 0.5mg PO Q 6 hours PRN anxiety  - LFT wnl for now, cont to trend LFTs  - QTC <500 for now, order EKG  - 2PCs papers signed by

## 2019-12-26 NOTE — PROGRESS NOTE BEHAVIORAL HEALTH - THOUGHT CONTENT
Delusions/Preoccupations
Delusions/Preoccupations
Preoccupations/Delusions
Delusions/Preoccupations
Preoccupations/Delusions

## 2019-12-26 NOTE — PROGRESS NOTE ADULT - ASSESSMENT
_________________________________________________________________________________________  ========>>  M E D I C A L   A T T E N D I N G    F O L L O W  U P  N O T E  <<=========  -----------------------------------------------------------------------------------------------------    - Patient seen and examined by me earlier today.   - In summary,  SHEKHAR VASQUEZ is a 69y year old woman who originally presented with leg infection   - Patient today overall doing fairly   no more diarrhea reported today at all     ==================>> REVIEW OF SYSTEM <<=================    GEN: no fever, no chills  RESP: reports occasional SOB now, no cough, no sputum  CVS: no chest pain now, no palpitations, + edema, unchanged   GI: no abdominal pain, no nausea  : no dysuria, no frequency, no hematuria  Neuro: no headache, no dizziness  Derm : no itching, no rash    ==================>> PHYSICAL EXAM <<=================    GEN: A&O X 3 , NAD , comfortable in recliner with legs down   HEENT: NCAT, PERRL, MMM, hearing intact  Neck: supple , no JVD  CVS: S1S2 , regular , No M/R/G appreciated  PULM: CTA B/L,  no W/R/R appreciated  ABD.: soft. non tender, non distended,  bowel sounds present  Extrem: intact pulses , + Bilateral LE edema stable        wound on left leg dressed   PSYCH :  ++ anxious asking fo ativan       ==================>> MEDICATIONS <<====================    ALBUTerol    90 MICROgram(s) HFA Inhaler 1 Puff(s) Inhalation every 4 hours  amLODIPine   Tablet 2.5 milliGRAM(s) Oral daily  atorvastatin 40 milliGRAM(s) Oral at bedtime  baclofen 5 milliGRAM(s) Oral three times a day  cholecalciferol 2000 Unit(s) Oral daily  clopidogrel Tablet 75 milliGRAM(s) Oral daily  cyanocobalamin 1000 MICROGram(s) Oral daily  diVALproex  milliGRAM(s) Oral two times a day  enoxaparin Injectable 40 milliGRAM(s) SubCutaneous daily  fluticasone propionate 50 MICROgram(s)/spray Nasal Spray 1 Spray(s) Both Nostrils two times a day  folic acid 1 milliGRAM(s) Oral daily  gabapentin 100 milliGRAM(s) Oral three times a day  levothyroxine 125 MICROGram(s) Oral daily  lidocaine   Patch 1 Patch Transdermal daily  losartan 100 milliGRAM(s) Oral daily  melatonin 3 milliGRAM(s) Oral at bedtime  montelukast 10 milliGRAM(s) Oral daily  OLANZapine 12.5 milliGRAM(s) Oral at bedtime  pantoprazole    Tablet 40 milliGRAM(s) Oral before breakfast  tiotropium 18 MICROgram(s) Capsule 1 Capsule(s) Inhalation daily  trihexyphenidyl 2 milliGRAM(s) Oral two times a day    MEDICATIONS  (PRN):  acetaminophen   Tablet .. 650 milliGRAM(s) Oral every 6 hours PRN Temp greater or equal to 38C (100.4F)  albuterol/ipratropium for Nebulization 3 milliLiter(s) Nebulizer every 6 hours PRN Shortness of Breath and/or Wheezing  LORazepam     Tablet 0.5 milliGRAM(s) Oral every 4 hours PRN anxiety or agitation  oxyCODONE    IR 5 milliGRAM(s) Oral every 6 hours PRN moderate or severe pain    ==================>> VITAL SIGNS <<==================    Vital Signs Last 24 Hrs  T(C): 37.3 (12-26-19 @ 12:12)  T(F): 99.2 (12-26-19 @ 12:12), Max: 99.2 (12-26-19 @ 12:12)  HR: 100 (12-26-19 @ 12:12) (64 - 100)  BP: 126/71 (12-26-19 @ 12:12)  RR: 18 (12-26-19 @ 12:12) (18 - 18)  SpO2: 95% (12-26-19 @ 12:12) (93% - 96%)       ==================>> LAB AND IMAGING <<==================                        10.1   7.29  )-----------( 171      ( 26 Dec 2019 08:56 )             33.5        143  |  102  |  6<L>  ----------------------------<  93  3.3<L>   |  28  |  0.48<L>    Ca    10.0      26 Dec 2019 07:27  Mg     2.0     12-25      WBC count:   7.29 <<== ,  7.08 <<== ,  7.45 <<== ,  8.69 <<== ,  9.65 <<==   Hemoglobin:   10.1 <<==,  9.5 <<==,  9.4 <<==,  8.9 <<==,  10.0 <<==  platelets:  171 <==, 140 <==, 143 <==, 111 <==, 105 <==    Creatinine:  0.48  <<==, 0.46  <<==, 0.46  <<==, 0.51  <<==  Sodium:   143  <==, 140  <==, 141  <==, 139  <==    ___________________________________________________________________________________  ===============>>  A S S E S S M E N T   A N D   P L A N <<===============  ------------------------------------------------------------------------------------------    · Assessment		  69 F PMH  Anxiety, HTN, HLD , NOEMY, asthma, COPD, DVT no longer on a/c , remote Breast Cancer, Kidney cancer  in remission , Graves disease, Hypothyroid , S/P laminectomy , carpal tunnel syndrome, Obesity, multiple admissions for sob in past attributed to anxiety/panic attacks, now p/w b/l LE edema and pain.      Problem/Plan - 1:  ·  Problem: Cellulitis of lower extremity / infected left leg wound > treated   -finished  abx >> DCed  local wound care    treat edema with diuretics   leg elevation reiterated   ACE wrapping daily     pt with diarrhea >> stopped   Cdiff  was negative     imodium as needed     Problem/Plan - 2:  ·  Problem:  Bacteriuria  now post fever  -ID appreciated   - finished abx >> observe off    Problem/Plan - 3:  ·  Problem:psych disorder  - psych following  - Continue Current medications and monitor.   - psych f/u and re-eval for need for inpatient psych transfer given overall improvement     pt medically cleared for transfer as needed     Problem/Plan - 4:  ·  Problem: Other chronic pain.  Plan: -c/w baclofen  - Continue Current medications and monitor.     Problem/Plan - 5:  ·  Problem: Debility  - PT / OOB as able   -fall precautions     Problem/Plan - 6:  Problem: Essential hypertension.  -c/w home losartan, norvasc.  - cardio following     Problem/Plan - 7:  ·  Problem: Prophylactic measure.  Plan: -lovenox vte ppx.    keep legs elevated     --------------------------------------------  Case discussed with pt, NP, RN  Education given on findings and plan of care  ___________________________  H. JANICE Welch.  Pager: [phone]

## 2019-12-26 NOTE — PROGRESS NOTE ADULT - SUBJECTIVE AND OBJECTIVE BOX
Cardiovascular Disease Progress Note    Overnight events: No acute events overnight.  no new cardiac sx. awaiting dispo to [facility]. diarrhea resolved  Otherwise review of systems negative    Objective Findings:  T(C): 36.8 (12-26-19 @ 05:46), Max: 37.5 (12-25-19 @ 14:51)  HR: 64 (12-26-19 @ 05:46) (64 - 93)  BP: 133/66 (12-26-19 @ 05:46) (111/81 - 133/66)  RR: 18 (12-26-19 @ 05:46) (18 - 18)  SpO2: 96% (12-26-19 @ 05:46) (93% - 96%)  Wt(kg): --  Daily     Daily       Physical Exam:  Gen: NAD  HEENT: EOMI  CV: RRR, normal S1 + S2, no m/r/g  Lungs: CTAB  Abd: soft, non-tender  Ext: minimal edema      Laboratory Data:                        9.5    7.08  )-----------( 140      ( 25 Dec 2019 08:40 )             31.6     12-25    140  |  103  |  7   ----------------------------<  88  3.7   |  25  |  0.46<L>    Ca    9.6      25 Dec 2019 07:34  Mg     2.0     12-25                Inpatient Medications:  MEDICATIONS  (STANDING):  ALBUTerol    90 MICROgram(s) HFA Inhaler 1 Puff(s) Inhalation every 4 hours  amLODIPine   Tablet 2.5 milliGRAM(s) Oral daily  atorvastatin 40 milliGRAM(s) Oral at bedtime  baclofen 5 milliGRAM(s) Oral three times a day  cholecalciferol 2000 Unit(s) Oral daily  clopidogrel Tablet 75 milliGRAM(s) Oral daily  cyanocobalamin 1000 MICROGram(s) Oral daily  diVALproex  milliGRAM(s) Oral two times a day  enoxaparin Injectable 40 milliGRAM(s) SubCutaneous daily  fluticasone propionate 50 MICROgram(s)/spray Nasal Spray 1 Spray(s) Both Nostrils two times a day  folic acid 1 milliGRAM(s) Oral daily  gabapentin 100 milliGRAM(s) Oral three times a day  levothyroxine 125 MICROGram(s) Oral daily  lidocaine   Patch 1 Patch Transdermal daily  losartan 100 milliGRAM(s) Oral daily  melatonin 3 milliGRAM(s) Oral at bedtime  montelukast 10 milliGRAM(s) Oral daily  OLANZapine 12.5 milliGRAM(s) Oral at bedtime  pantoprazole    Tablet 40 milliGRAM(s) Oral before breakfast  tiotropium 18 MICROgram(s) Capsule 1 Capsule(s) Inhalation daily  trihexyphenidyl 2 milliGRAM(s) Oral two times a day      Assessment:  -sob  -le cellulitis  -atypical cp s/p recent LHC with normal cors  -venous insufficiency  -copd/asthma  -obesity  -melba  -severe anxiety  -hypothyroidism    Recs:  -c/w home antihypertensives and diuretics. appears euvolemic  -c/w bronchodilators  -psychiatry recs appreciated. dosing of anti-psychotics per psych. would probably benefit from inpatient psychiatry if agreeable due to her multiple hospitalizations and inability to care for herself in setting of delusional disorder etc. suspect she will not follow up with psych as an outpatient. deemed not to have capacity. awaiting transfer to [facility]  -keep legs elevated, compression stockings and c/w diuretic. unclear why now on plavix. would attempt to clarify  -f/u ID. s/p merrem for tx esbl in urine  -DVT ppx  -f/u stool studies          Over 25 minutes spent on total encounter; more than 50% of the visit was spent counseling and/or coordinating care by the attending physician.      Juan Salcedo MD   Cardiovascular Disease  [phone]

## 2019-12-26 NOTE — PROGRESS NOTE BEHAVIORAL HEALTH - THOUGHT PROCESS
Impaired reasoning/Perseverative
Impaired reasoning/Tangential/Perseverative
Perseverative/Impaired reasoning
Impaired reasoning/Perseverative
Perseverative/Impaired reasoning/Tangential
Tangential/Impaired reasoning/Perseverative
Tangential/Perseverative/Impaired reasoning
Tangential/Perseverative/Impaired reasoning

## 2019-12-26 NOTE — PROGRESS NOTE BEHAVIORAL HEALTH - NSBHCHARTREVIEWLAB_PSY_A_CORE FT
8.9    8.69  )-----------( 111      ( 23 Dec 2019 08:32 )             29.6     12-23    139  |  103  |  11  ----------------------------<  95  3.8   |  26  |  0.51    Ca    9.5      23 Dec 2019 06:57
10.1   7.29  )-----------( 171      ( 26 Dec 2019 08:56 )             33.5     12-26    143  |  102  |  6<L>  ----------------------------<  93  3.3<L>   |  28  |  0.48<L>    Ca    10.0      26 Dec 2019 07:27  Mg     2.0     12-25
10.8   13.49 )-----------( 131      ( 16 Dec 2019 08:15 )             36.6     12-16    144  |  106  |  18  ----------------------------<  98  3.5   |  29  |  0.63    Ca    8.7      16 Dec 2019 06:16  Phos  2.2     12-15  Mg     2.2     12-15    TPro  5.4<L>  /  Alb  3.2<L>  /  TBili  0.7  /  DBili  x   /  AST  29  /  ALT  47<H>  /  AlkPhos  58  12-15
11.3   15.46 )-----------( 124      ( 17 Dec 2019 09:11 )             37.1     12-17    141  |  103  |  14  ----------------------------<  133<H>  3.4<L>   |  26  |  0.66    Ca    8.7      17 Dec 2019 06:44    TPro  5.4<L>  /  Alb  2.9<L>  /  TBili  0.9  /  DBili  x   /  AST  9<L>  /  ALT  27  /  AlkPhos  59  12-17
11.3   15.46 )-----------( 124      ( 17 Dec 2019 09:11 )             37.1     12-18    141  |  103  |  10  ----------------------------<  114<H>  3.6   |  26  |  0.47<L>    Ca    8.8      18 Dec 2019 05:45    TPro  5.4<L>  /  Alb  2.9<L>  /  TBili  0.9  /  DBili  x   /  AST  9<L>  /  ALT  27  /  AlkPhos  59  12-17
9.4    9.74  )-----------( 97       ( 20 Dec 2019 06:29 )             30.9     12-20    139  |  103  |  9   ----------------------------<  132<H>  3.5   |  25  |  0.55    Ca    9.2      20 Dec 2019 06:29    TPro  5.9<L>  /  Alb  2.8<L>  /  TBili  0.7  /  DBili  x   /  AST  9<L>  /  ALT  16  /  AlkPhos  54  12-20
9.9    10.49 )-----------( 95       ( 19 Dec 2019 08:12 )             32.7     12-19    143  |  102  |  8   ----------------------------<  100<H>  3.5   |  30  |  0.57    Ca    9.0      19 Dec 2019 06:43        CAPILLARY BLOOD GLUCOSE
8.9    8.69  )-----------( 111      ( 23 Dec 2019 08:32 )             29.6     12-23    139  |  103  |  11  ----------------------------<  95  3.8   |  26  |  0.51    Ca    9.5      23 Dec 2019 06:57

## 2019-12-26 NOTE — PROGRESS NOTE BEHAVIORAL HEALTH - NS ED BHA MSE SPEECH RATE
Fast, difficult to interrupt/Normal
Normal
Fast, difficult to interrupt
Fast, difficult to interrupt/Normal
Normal
Fast, difficult to interrupt/Normal

## 2019-12-26 NOTE — PROGRESS NOTE BEHAVIORAL HEALTH - NSBHCHARTREVIEWINVESTIGATE_PSY_A_CORE FT
Ventricular Rate 70 BPM    Atrial Rate 70 BPM    P-R Interval 144 ms    QRS Duration 80 ms    Q-T Interval 364 ms    QTC Calculation(Bezet) 393 ms    P Axis 56 degrees    R Axis 11 degrees    T Axis 16 degrees    Diagnosis Line NORMAL SINUS RHYTHM  POSSIBLE ANTERIOR INFARCT , AGE UNDETERMINED  ABNORMAL ECG    Confirmed by MD Vinny, [facility] [id number]) on 12/21/2019 9:54:56 AM
< from: 12 Lead ECG (11.11.19 @ 04:59) >      Ventricular Rate 59 BPM    Atrial Rate 59 BPM    P-R Interval 148 ms    QRS Duration 94 ms    Q-T Interval 412 ms    QTC Calculation(Bezet) 407 ms    P Axis 54 degrees    R Axis 0 degrees    T Axis 4 degrees    Diagnosis Line Sinus bradycardia  Otherwise normal ECG    Confirmed by CORINNA RING, JEFFREY ([id number]) on 11/13/2019 2:17:55 PM    < end of copied text >
< from: 12 Lead ECG (11.11.19 @ 04:59) >      Ventricular Rate 59 BPM    Atrial Rate 59 BPM    P-R Interval 148 ms    QRS Duration 94 ms    Q-T Interval 412 ms    QTC Calculation(Bezet) 407 ms    P Axis 54 degrees    R Axis 0 degrees    T Axis 4 degrees    Diagnosis Line Sinus bradycardia  Otherwise normal ECG    Confirmed by CORINNA RING, JEFFREY ([id number]) on 11/13/2019 2:17:55 PM    < end of copied text >
Ventricular Rate 70 BPM    Atrial Rate 70 BPM    P-R Interval 144 ms    QRS Duration 80 ms    Q-T Interval 364 ms    QTC Calculation(Bezet) 393 ms    P Axis 56 degrees    R Axis 11 degrees    T Axis 16 degrees    Diagnosis Line NORMAL SINUS RHYTHM  POSSIBLE ANTERIOR INFARCT , AGE UNDETERMINED  ABNORMAL ECG    Confirmed by MD Vinny, [facility] [id number]) on 12/21/2019 9:54:56 AM
< from: 12 Lead ECG (11.11.19 @ 04:59) >      Ventricular Rate 59 BPM    Atrial Rate 59 BPM    P-R Interval 148 ms    QRS Duration 94 ms    Q-T Interval 412 ms    QTC Calculation(Bezet) 407 ms    P Axis 54 degrees    R Axis 0 degrees    T Axis 4 degrees    Diagnosis Line Sinus bradycardia  Otherwise normal ECG    Confirmed by CORINNA RING, JEFFREY ([id number]) on 11/13/2019 2:17:55 PM    < end of copied text >
Ventricular Rate 70 BPM    Atrial Rate 70 BPM    P-R Interval 144 ms    QRS Duration 80 ms    Q-T Interval 364 ms    QTC Calculation(Bezet) 393 ms    P Axis 56 degrees    R Axis 11 degrees    T Axis 16 degrees    Diagnosis Line NORMAL SINUS RHYTHM  POSSIBLE ANTERIOR INFARCT , AGE UNDETERMINED  ABNORMAL ECG    Confirmed by MD Vinny, [facility] [id number]) on 12/21/2019 9:54:56 AM
< from: 12 Lead ECG (11.11.19 @ 04:59) >      Ventricular Rate 59 BPM    Atrial Rate 59 BPM    P-R Interval 148 ms    QRS Duration 94 ms    Q-T Interval 412 ms    QTC Calculation(Bezet) 407 ms    P Axis 54 degrees    R Axis 0 degrees    T Axis 4 degrees    Diagnosis Line Sinus bradycardia  Otherwise normal ECG    Confirmed by CORINNA RING, JEFFREY ([id number]) on 11/13/2019 2:17:55 PM    < end of copied text >
< from: 12 Lead ECG (11.11.19 @ 04:59) >      Ventricular Rate 59 BPM    Atrial Rate 59 BPM    P-R Interval 148 ms    QRS Duration 94 ms    Q-T Interval 412 ms    QTC Calculation(Bezet) 407 ms    P Axis 54 degrees    R Axis 0 degrees    T Axis 4 degrees    Diagnosis Line Sinus bradycardia  Otherwise normal ECG    Confirmed by CORINNA RING, JEFFREY ([id number]) on 11/13/2019 2:17:55 PM    < end of copied text >

## 2019-12-26 NOTE — PROGRESS NOTE BEHAVIORAL HEALTH - NSBHCHARTREVIEWVS_PSY_A_CORE FT
Vital Signs Last 24 Hrs  T(C): 36.4 (23 Dec 2019 04:28), Max: 37.2 (22 Dec 2019 12:34)  T(F): 97.5 (23 Dec 2019 04:28), Max: 98.9 (22 Dec 2019 12:34)  HR: 76 (23 Dec 2019 04:28) (76 - 100)  BP: 145/63 (23 Dec 2019 04:28) (145/63 - 156/58)  BP(mean): --  RR: 18 (23 Dec 2019 04:28) (18 - 18)  SpO2: 94% (23 Dec 2019 04:28) (94% - 95%)
ICU Vital Signs Last 24 Hrs  T(C): 36.8 (26 Dec 2019 05:46), Max: 37.5 (25 Dec 2019 14:51)  T(F): 98.3 (26 Dec 2019 05:46), Max: 99.5 (25 Dec 2019 14:51)  HR: 64 (26 Dec 2019 05:46) (64 - 93)  BP: 133/66 (26 Dec 2019 05:46) (111/81 - 133/66)  BP(mean): --  ABP: --  ABP(mean): --  RR: 18 (26 Dec 2019 05:46) (18 - 18)  SpO2: 96% (26 Dec 2019 05:46) (93% - 96%)
ICU Vital Signs Last 24 Hrs  T(C): 37.1 (18 Dec 2019 04:09), Max: 39.1 (17 Dec 2019 15:00)  T(F): 98.7 (18 Dec 2019 04:09), Max: 102.3 (17 Dec 2019 15:00)  HR: 60 (18 Dec 2019 04:09) (60 - 106)  BP: 149/73 (18 Dec 2019 04:09) (132/69 - 149/73)  BP(mean): --  ABP: --  ABP(mean): --  RR: 17 (18 Dec 2019 04:09) (17 - 18)  SpO2: 95% (18 Dec 2019 04:09) (94% - 96%)
Vital Signs Last 24 Hrs  T(C): 36.7 (20 Dec 2019 04:39), Max: 37.5 (19 Dec 2019 13:29)  T(F): 98 (20 Dec 2019 04:39), Max: 99.5 (19 Dec 2019 13:29)  HR: 69 (20 Dec 2019 04:39) (69 - 87)  BP: 156/80 (20 Dec 2019 04:39) (149/60 - 156/80)  BP(mean): --  RR: 18 (20 Dec 2019 04:39) (18 - 18)  SpO2: 97% (20 Dec 2019 04:39) (95% - 97%)
Vital Signs Last 24 Hrs  T(C): 37.1 (16 Dec 2019 12:02), Max: 37.1 (16 Dec 2019 12:02)  T(F): 98.8 (16 Dec 2019 12:02), Max: 98.8 (16 Dec 2019 12:02)  HR: 92 (16 Dec 2019 14:54) (51 - 95)  BP: 160/70 (16 Dec 2019 14:54) (146/75 - 162/71)  BP(mean): --  RR: 18 (16 Dec 2019 14:54) (18 - 18)  SpO2: 97% (16 Dec 2019 14:54) (97% - 100%)
Vital Signs Last 24 Hrs  T(C): 37.5 (19 Dec 2019 13:29), Max: 37.5 (19 Dec 2019 13:29)  T(F): 99.5 (19 Dec 2019 13:29), Max: 99.5 (19 Dec 2019 13:29)  HR: 85 (19 Dec 2019 13:29) (79 - 85)  BP: 152/70 (19 Dec 2019 13:29) (112/68 - 152/70)  BP(mean): --  RR: 18 (19 Dec 2019 13:29) (17 - 18)  SpO2: 95% (19 Dec 2019 13:29) (95% - 96%)
Vital Signs Last 24 Hrs  T(C): 39.1 (17 Dec 2019 15:00), Max: 39.1 (17 Dec 2019 15:00)  T(F): 102.3 (17 Dec 2019 15:00), Max: 102.3 (17 Dec 2019 15:00)  HR: 106 (17 Dec 2019 15:00) (78 - 111)  BP: 149/70 (17 Dec 2019 15:00) (140/75 - 159/89)  BP(mean): --  RR: 18 (17 Dec 2019 15:00) (17 - 18)  SpO2: 94% (17 Dec 2019 15:00) (94% - 100%)
Vital Signs Last 24 Hrs  T(C): 37 (24 Dec 2019 04:16), Max: 37.6 (23 Dec 2019 20:53)  T(F): 98.6 (24 Dec 2019 04:16), Max: 99.6 (23 Dec 2019 20:53)  HR: 88 (24 Dec 2019 04:16) (88 - 108)  BP: 121/65 (24 Dec 2019 04:16) (114/51 - 140/69)  BP(mean): --  RR: 18 (24 Dec 2019 04:16) (18 - 18)  SpO2: 99% (24 Dec 2019 04:16) (97% - 99%)

## 2019-12-26 NOTE — PROGRESS NOTE BEHAVIORAL HEALTH - RISK ASSESSMENT
Risk factors: chronic psychosis, anxiety, medical issues, concern for cognitive impairment, previous psych admissions, not in tx    Protective factors: no current SIIP/HIIP, no active substance abuse, domiciled, intact marriage, social supports, help-seeking behaviors    Overall, pt is at chronically elevated risk of harm with some recent exacerbation; will continue to monitor.

## 2019-12-26 NOTE — PROGRESS NOTE BEHAVIORAL HEALTH - NSBHCONSULTRECOMMENDOTHER_PSY_A_CORE FT
Recommendations:  - c/w zyprexa to 12.5mg PO Q HS  - c/w depakote 250mg PO BID  - c/w ativan 0.5mg PO Q 6 hours PRN anxiety  - LFT wnl for now, cont to trend LFTs  - QTC <500 for now, order EKG  - 2PCs papers signed by  Recommendations:  - c/w zyprexa 12.5mg PO Q HS  - c/w depakote 250mg PO BID  - c/w ativan 0.5mg PO Q 6 hours PRN anxiety  - LFT wnl for now, cont to trend LFTs  - QTC <500 for now, order EKG  - 2PCs papers signed by

## 2019-12-26 NOTE — PROGRESS NOTE BEHAVIORAL HEALTH - CASE SUMMARY
69 F with PPHX of delusional disorder and anxiety disorder with multiple psychiatric admissions and PMH  Anxiety, minimally ambulatory with use of walker,  HTN, HLD , NOEMY, asthma, COPD, DVT no longer on a/c , remote Breast Cancer, Kidney cancer  in remission , Graves disease, Hypothyroid, S/P laminectomy, carpal tunnel syndrome, Obesity, multiple admissions for sob in past attributed to anxiety/panic attacks, now p/w b/l LE edema and pain.   She complains of dyspnea and is admitted medically for cellulitis of the leg and UTI.  Psychiatry is consulted due to history of anxiety and paranoia.  Pt remains paranoid towards , labile, verbally agitated but overall redirectable, no SI/HI.   states pt has been paranoid for several years, worse since her psychiatrist retired 6 mos ago, not taking meds.  12/25: Pt labile, excitable, does not remember calling psychiatrist a "shark" 2 days ago.  Denies SI/HI.  Still paranoid towards family.  Agree with resident's assessment and plan as above; psychiatric admission when medically cleared, 2PC in chart.

## 2019-12-26 NOTE — PROGRESS NOTE BEHAVIORAL HEALTH - NSBHATTESTSEENBY_PSY_A_CORE
Attending Psychiatrist supervising NP/Trainee, meeting pt...

## 2019-12-26 NOTE — PROGRESS NOTE BEHAVIORAL HEALTH - MODIFICATIONS
Modifications as above and below

## 2019-12-26 NOTE — PROGRESS NOTE BEHAVIORAL HEALTH - NSBHCONSULTMEDANXIETY_PSY_A_CORE FT
- c/w ativan 0.5mg PO Q 6 hours PRN anxiety

## 2019-12-26 NOTE — PROGRESS NOTE BEHAVIORAL HEALTH - NS ED BHA MSE GENERAL APPEARANCE
Deformities present/Well developed
Well developed/Deformities present
Well developed/Deformities present
Deformities present/Well developed
Well developed/Deformities present

## 2019-12-26 NOTE — PROGRESS NOTE BEHAVIORAL HEALTH - NSBHFUPREASONCONS_PSY_A_CORE
agitation
agitation/anxiety
dementia/agitation/anxiety
psychosis/agitation
psychosis

## 2019-12-26 NOTE — PROGRESS NOTE BEHAVIORAL HEALTH - NSBHFUPINTERVALHXFT_PSY_A_CORE
Pt was awake, alert, and orientated to self and place. Knows it is the 26th, but states she keeps asking staff for a calendar so she can keep track of dates. Slept well last night although she continues to have "asthma attacks." Continues to believe that people wear strong perfumes to exacerbate her asthma. She is eating okay and notes she is  waiting for her  to come see her. States she did not see family yesterday, but she watched TV with "residents of this place" last night. Cannot report what she watched on TV. Denies any A/V hallucinations. Denies any SI/HI.     Per collateral from , Mr. Whittaker Nujuanaemma , pt continues to fluctuate in mood, at times being pleasant and at other times being very irritable. Pt called  last night, was calmed, but asked him to come pick her from the "hotel" so he can take her to hospital. However, later on she called again and yelled at him for abandoning her, never visiting her, and not wanting to care of her. Per , family is still interested in inpt simon psych admission as they do not feel safe taking her home at this time.

## 2019-12-26 NOTE — PROGRESS NOTE BEHAVIORAL HEALTH - AFFECT QUALITY
Anxious/Euthymic
Elevated
Irritable/Anxious
Irritable/Anxious
Anxious/Depressed
Depressed/Anxious
Euthymic/Anxious
Anxious/Depressed

## 2019-12-27 LAB — VALPROATE SERPL-MCNC: 16.1 UG/ML — LOW (ref 50–100)

## 2019-12-27 PROCEDURE — 99223 1ST HOSP IP/OBS HIGH 75: CPT

## 2019-12-27 PROCEDURE — 99232 SBSQ HOSP IP/OBS MODERATE 35: CPT | Mod: GC

## 2019-12-27 RX ORDER — ENOXAPARIN SODIUM 100 MG/ML
40 INJECTION SUBCUTANEOUS DAILY
Refills: 0 | Status: DISCONTINUED | OUTPATIENT
Start: 2019-12-27 | End: 2020-01-22

## 2019-12-27 RX ORDER — OLANZAPINE 15 MG/1
15 TABLET, FILM COATED ORAL AT BEDTIME
Refills: 0 | Status: DISCONTINUED | OUTPATIENT
Start: 2019-12-27 | End: 2019-12-30

## 2019-12-27 RX ORDER — NYSTATIN CREAM 100000 [USP'U]/G
1 CREAM TOPICAL
Refills: 0 | Status: DISCONTINUED | OUTPATIENT
Start: 2019-12-27 | End: 2020-01-30

## 2019-12-27 RX ADMIN — GABAPENTIN 100 MILLIGRAM(S): 400 CAPSULE ORAL at 20:55

## 2019-12-27 RX ADMIN — OLANZAPINE 15 MILLIGRAM(S): 15 TABLET, FILM COATED ORAL at 20:55

## 2019-12-27 RX ADMIN — Medication 3 MILLILITER(S): at 22:38

## 2019-12-27 RX ADMIN — OXYCODONE HYDROCHLORIDE 5 MILLIGRAM(S): 5 TABLET ORAL at 10:55

## 2019-12-27 RX ADMIN — Medication 5 MILLIGRAM(S): at 20:55

## 2019-12-27 RX ADMIN — PREGABALIN 1000 MICROGRAM(S): 225 CAPSULE ORAL at 10:40

## 2019-12-27 RX ADMIN — AMLODIPINE BESYLATE 2.5 MILLIGRAM(S): 2.5 TABLET ORAL at 10:40

## 2019-12-27 RX ADMIN — MONTELUKAST 10 MILLIGRAM(S): 4 TABLET, CHEWABLE ORAL at 10:40

## 2019-12-27 RX ADMIN — Medication 2 MILLIGRAM(S): at 20:56

## 2019-12-27 RX ADMIN — Medication 3 MILLILITER(S): at 09:28

## 2019-12-27 RX ADMIN — LIDOCAINE 1 PATCH: 4 CREAM TOPICAL at 19:56

## 2019-12-27 RX ADMIN — Medication 5 MILLIGRAM(S): at 10:40

## 2019-12-27 RX ADMIN — Medication 1 MILLIGRAM(S): at 10:40

## 2019-12-27 RX ADMIN — Medication 3 MILLILITER(S): at 16:29

## 2019-12-27 RX ADMIN — Medication 3 MILLIGRAM(S): at 20:55

## 2019-12-27 RX ADMIN — NYSTATIN CREAM 1 APPLICATION(S): 100000 CREAM TOPICAL at 20:55

## 2019-12-27 RX ADMIN — GABAPENTIN 100 MILLIGRAM(S): 400 CAPSULE ORAL at 10:40

## 2019-12-27 RX ADMIN — OXYCODONE HYDROCHLORIDE 5 MILLIGRAM(S): 5 TABLET ORAL at 23:37

## 2019-12-27 RX ADMIN — DIVALPROEX SODIUM 250 MILLIGRAM(S): 500 TABLET, DELAYED RELEASE ORAL at 20:55

## 2019-12-27 RX ADMIN — Medication 2 MILLIGRAM(S): at 10:41

## 2019-12-27 RX ADMIN — Medication 2000 UNIT(S): at 10:40

## 2019-12-27 RX ADMIN — OXYCODONE HYDROCHLORIDE 5 MILLIGRAM(S): 5 TABLET ORAL at 11:55

## 2019-12-27 RX ADMIN — OXYCODONE HYDROCHLORIDE 5 MILLIGRAM(S): 5 TABLET ORAL at 22:38

## 2019-12-27 RX ADMIN — Medication 5 MILLIGRAM(S): at 12:32

## 2019-12-27 RX ADMIN — ATORVASTATIN CALCIUM 40 MILLIGRAM(S): 80 TABLET, FILM COATED ORAL at 20:55

## 2019-12-27 RX ADMIN — DIVALPROEX SODIUM 250 MILLIGRAM(S): 500 TABLET, DELAYED RELEASE ORAL at 10:40

## 2019-12-27 RX ADMIN — Medication 125 MICROGRAM(S): at 10:40

## 2019-12-27 RX ADMIN — GABAPENTIN 100 MILLIGRAM(S): 400 CAPSULE ORAL at 12:33

## 2019-12-27 RX ADMIN — LOSARTAN POTASSIUM 100 MILLIGRAM(S): 100 TABLET, FILM COATED ORAL at 10:40

## 2019-12-27 RX ADMIN — LIDOCAINE 1 PATCH: 4 CREAM TOPICAL at 22:37

## 2019-12-27 RX ADMIN — CLOPIDOGREL BISULFATE 75 MILLIGRAM(S): 75 TABLET, FILM COATED ORAL at 10:40

## 2019-12-27 RX ADMIN — LIDOCAINE 1 PATCH: 4 CREAM TOPICAL at 10:40

## 2019-12-27 NOTE — CONSULT NOTE ADULT - ASSESSMENT
70 yo lady with extensive medical history which includes HTN, HLD, NOEMY/obesity, Asthma/COPD?, DVT (not on AC now), breast and kidney CA (ABDIRAHMAN now), hypothyroidism/graves disease?, chronic pain s/p laminectomy, carpal tunnel.   Patient this time was admitted to [facility] for cellulitis and bacteruria. Patient's hospitalization c/b diarrhea, likely secondary to antibiotics. The patient was subsequently transferred to [facility] b/o paranoid delusions    #Asthma/COPD  Lungs are clear b/l  O2 levels stable  Complaints are likely somatic in nature  continue duo nebs every 6 hours for now and Singulair  Will likely need reassurance  Patient refuses CPAP for her NOEMY    #Hypothyroidism  Continue synthroid 125 mcg  Last TSH normal, periodic outpatient follow up recommended    #HTN  Continue current meds (amlodipine and losartan)  Blood pressure will be monitored and if needed medications will be adjusted accordingly    #Chronic pain  Patient was prescribed opioids by outpatient doctors  Continue current oxycodone as per [facility] records. Please attempt to touch base with outpatient doctor to determine indications

## 2019-12-27 NOTE — CONSULT NOTE ADULT - SUBJECTIVE AND OBJECTIVE BOX
Anna To is a 69 year old lady with extensive medical history which includes HTN, HLD, NOEMY/obesity, Asthma/COPD?, DVT (not on AC now), breast and kidney CA (ABDIRAHMAN now), hypothyroidism/graves disease?, chronic pain s/p laminectomy, carpal tunnel.   Patient this time was admitted to [facility] for cellulitis and bacteruria. Patient's hospitalization c/b diarrhea, likely secondary to antibiotics. The patient was subsequently transferred to [facility] b/o paranoid delusions.  Patient continues to complain of "severe asthma attacks" and chronic pain in multiple areas of her body with positive ROS in every single system mentioned.       PAST MEDICAL & SURGICAL HISTORY:  Asthma  Sleep apnea  Obesity  DVT (deep venous thrombosis): history of- not presently on A/C  Hypothyroid  COPD (chronic obstructive pulmonary disease)  Breast cancer in situ, left  Kidney cancer, primary, with metastasis from kidney to other site, left: LEft sided- s/p nephrectomy only- no chemo or RT  Graves disease  Anxiety  Hyperlipidemia  Hypertension  S/P laminectomy: now bed and wheelchair ridden with severe pain  History of parathyroidectomy  History of carpal tunnel release: right wrist  History of eye surgery: 7 surgeries secondary to grave&#x27;s disease  History of pelvic surgery: Pelvic Sling  S/P breast biopsy: left  S/p nephrectomy: left  S/P cholecystectomy      Review of Systems:   Patient's complains of chronic pain in multiple areas of her body with positive ROS in every single system mentioned.       Allergies    Grapes (Hives)  No Known Drug Allergies  Peaches (Hives)  shellfish (Hives)    Intolerances        Social History:   Was residing with , taking opioids for chronic pain,     FAMILY HISTORY:  Family history of thyroid cancer (Child): daughter has thyroid cancer  Family history of heart disease (Sibling): brother has a PPM  Family history of diabetes mellitus (Sibling): siblings  Family history of hypertension: mother and father  Family history of myocardial infarction: mother  at age 67 and father at age 67 of MI&#x27;s      MEDICATIONS  (STANDING):  amLODIPine   Tablet 2.5 milliGRAM(s) Oral daily  atorvastatin 40 milliGRAM(s) Oral at bedtime  baclofen 5 milliGRAM(s) Oral three times a day  cholecalciferol 2000 Unit(s) Oral daily  clopidogrel Tablet 75 milliGRAM(s) Oral daily  cyanocobalamin 1000 MICROGram(s) Oral daily  diVALproex  milliGRAM(s) Oral two times a day  fluticasone propionate 50 MICROgram(s)/spray Nasal Spray 1 Spray(s) Both Nostrils two times a day  folic acid 1 milliGRAM(s) Oral daily  gabapentin 100 milliGRAM(s) Oral three times a day  levothyroxine 125 MICROGram(s) Oral daily  lidocaine   Patch 1 Patch Transdermal daily  losartan 100 milliGRAM(s) Oral daily  melatonin. 3 milliGRAM(s) Oral at bedtime  montelukast 10 milliGRAM(s) Oral daily  nystatin Powder 1 Application(s) Topical two times a day  OLANZapine 15 milliGRAM(s) Oral at bedtime  pantoprazole    Tablet 40 milliGRAM(s) Oral before breakfast  trihexyphenidyl 2 milliGRAM(s) Oral two times a day    MEDICATIONS  (PRN):  acetaminophen   Tablet .. 650 milliGRAM(s) Oral every 6 hours PRN Temp greater or equal to 38C (100.4F), Moderate Pain (4 - 6), Severe Pain (7 - 10)  albuterol/ipratropium for Nebulization. 3 milliLiter(s) Nebulizer every 6 hours PRN asthma  haloperidol     Tablet 0.5 milliGRAM(s) Oral every 6 hours PRN agitation  haloperidol    Injectable 0.5 milliGRAM(s) IntraMuscular once PRN combative behavior  LORazepam     Tablet 0.5 milliGRAM(s) Oral every 4 hours PRN anxiety or agitation  oxyCODONE    IR 5 milliGRAM(s) Oral two times a day PRN severe pain      Vital Signs Last 24 Hrs  T(C): 37.3 (27 Dec 2019 08:00), Max: 37.3 (26 Dec 2019 12:12)  T(F): 99.2 (27 Dec 2019 08:00), Max: 99.2 (26 Dec 2019 12:12)  HR: 100 (26 Dec 2019 12:12) (100 - 100)  BP: 126/71 (26 Dec 2019 12:12) (126/71 - 126/71)  BP(mean): --  RR: 18 (27 Dec 2019 08:00) (18 - 18)  SpO2: 96% (27 Dec 2019 01:10) (95% - 96%)  CAPILLARY BLOOD GLUCOSE            PHYSICAL EXAM:  GENERAL: NAD, well-developed  HEAD:  Atraumatic, Normocephalic  EYES: EOMI, conjunctiva and sclera clear  NECK: Supple, No JVD  CHEST/LUNG: Clear to auscultation bilaterally; No wheeze  HEART: Regular rate and rhythm; No murmurs, rubs, or gallops  ABDOMEN: Soft, Nontender, Nondistended; Bowel sounds present  EXTREMITIES:  2+ Peripheral Pulses, No clubbing, cyanosis, or edema  NEUROLOGY: non-focal  SKIN: No rashes or lesions    LABS:                        10.1   7.29  )-----------( 171      ( 26 Dec 2019 08:56 )             33.5     12-    143  |  102  |  6<L>  ----------------------------<  93  3.3<L>   |  28  |  0.48<L>    Ca    10.0      26 Dec 2019 07:27                EKG(personally reviewed):    RADIOLOGY & ADDITIONAL TESTS:    Imaging Personally Reviewed:    Consultant(s) Notes Reviewed:      Care Discussed with Consultants/Other Providers: Anna To is a 69 year old lady with extensive medical history which includes HTN, HLD, NOEMY/obesity, Asthma/COPD?, DVT (not on AC now), breast and kidney CA (ABDIRAHMAN now), hypothyroidism/graves disease?, chronic pain s/p laminectomy, carpal tunnel.   Patient this time was admitted to [facility] for cellulitis and bacteruria. Patient's hospitalization c/b diarrhea, likely secondary to antibiotics. The patient was subsequently transferred to [facility] b/o paranoid delusions.  Patient continues to complain of "severe asthma attacks" and chronic pain in multiple areas of her body with positive ROS in many systems.      PAST MEDICAL & SURGICAL HISTORY:  Asthma  Sleep apnea  Obesity  DVT (deep venous thrombosis): history of- not presently on A/C  Hypothyroid  COPD (chronic obstructive pulmonary disease)  Breast cancer in situ, left  Kidney cancer, primary, with metastasis from kidney to other site, left: LEft sided- s/p nephrectomy only- no chemo or RT  Graves disease  Anxiety  Hyperlipidemia  Hypertension  S/P laminectomy: now bed and wheelchair ridden with severe pain  History of parathyroidectomy  History of carpal tunnel release: right wrist  History of eye surgery: 7 surgeries secondary to grave&#x27;s disease  History of pelvic surgery: Pelvic Sling  S/P breast biopsy: left  S/p nephrectomy: left  S/P cholecystectomy      Review of Systems:   Patient's complains of chronic pain in multiple areas of her body with positive ROS in many systems.   General: +Fatigue, chronic wide spread pain  Respiratory: +occasional shortness of breath  Cardiac: +chest pain, +leg swelling,   Musculoskeletal: Weakness, poor ambulation          Allergies    Grapes (Hives)  No Known Drug Allergies  Peaches (Hives)  shellfish (Hives)    Intolerances        Social History:   Was residing with , taking opioids for chronic pain,     FAMILY HISTORY:  Family history of thyroid cancer (Child): daughter has thyroid cancer  Family history of heart disease (Sibling): brother has a PPM  Family history of diabetes mellitus (Sibling): siblings  Family history of hypertension: mother and father  Family history of myocardial infarction: mother  at age 67 and father at age 67 of MI&#x27;s      MEDICATIONS  (STANDING):  amLODIPine   Tablet 2.5 milliGRAM(s) Oral daily  atorvastatin 40 milliGRAM(s) Oral at bedtime  baclofen 5 milliGRAM(s) Oral three times a day  cholecalciferol 2000 Unit(s) Oral daily  clopidogrel Tablet 75 milliGRAM(s) Oral daily  cyanocobalamin 1000 MICROGram(s) Oral daily  diVALproex  milliGRAM(s) Oral two times a day  fluticasone propionate 50 MICROgram(s)/spray Nasal Spray 1 Spray(s) Both Nostrils two times a day  folic acid 1 milliGRAM(s) Oral daily  gabapentin 100 milliGRAM(s) Oral three times a day  levothyroxine 125 MICROGram(s) Oral daily  lidocaine   Patch 1 Patch Transdermal daily  losartan 100 milliGRAM(s) Oral daily  melatonin. 3 milliGRAM(s) Oral at bedtime  montelukast 10 milliGRAM(s) Oral daily  nystatin Powder 1 Application(s) Topical two times a day  OLANZapine 15 milliGRAM(s) Oral at bedtime  pantoprazole    Tablet 40 milliGRAM(s) Oral before breakfast  trihexyphenidyl 2 milliGRAM(s) Oral two times a day    MEDICATIONS  (PRN):  acetaminophen   Tablet .. 650 milliGRAM(s) Oral every 6 hours PRN Temp greater or equal to 38C (100.4F), Moderate Pain (4 - 6), Severe Pain (7 - 10)  albuterol/ipratropium for Nebulization. 3 milliLiter(s) Nebulizer every 6 hours PRN asthma  haloperidol     Tablet 0.5 milliGRAM(s) Oral every 6 hours PRN agitation  haloperidol    Injectable 0.5 milliGRAM(s) IntraMuscular once PRN combative behavior  LORazepam     Tablet 0.5 milliGRAM(s) Oral every 4 hours PRN anxiety or agitation  oxyCODONE    IR 5 milliGRAM(s) Oral two times a day PRN severe pain      Vital Signs Last 24 Hrs  T(C): 37.3 (27 Dec 2019 08:00), Max: 37.3 (26 Dec 2019 12:12)  T(F): 99.2 (27 Dec 2019 08:00), Max: 99.2 (26 Dec 2019 12:12)  HR: 100 (26 Dec 2019 12:12) (100 - 100)  BP: 126/71 (26 Dec 2019 12:12) (126/71 - 126/71)  BP(mean): --  RR: 18 (27 Dec 2019 08:00) (18 - 18)  SpO2: 96% (27 Dec 2019 01:10) (95% - 96%)  CAPILLARY BLOOD GLUCOSE            PHYSICAL EXAM:  GENERAL: NAD, well-developed  HEAD:  Atraumatic, Normocephalic  EYES: EOMI, conjunctiva and sclera clear  NECK: Supple, No JVD  CHEST/LUNG: Clear to auscultation bilaterally; No wheeze  HEART: Regular rate and rhythm; No murmurs, rubs, or gallops  ABDOMEN: Soft, Nontender, Nondistended; Bowel sounds present  EXTREMITIES:  B/L lower extremity edema, as per patient chronic  NEUROLOGY: non-focal  SKIN: No rashes or lesions    LABS:                        10.1   7.29  )-----------( 171      ( 26 Dec 2019 08:56 )             33.5     12-    143  |  102  |  6<L>  ----------------------------<  93  3.3<L>   |  28  |  0.48<L>    Ca    10.0      26 Dec 2019 07:27                EKG(personally reviewed):    RADIOLOGY & ADDITIONAL TESTS:    Imaging Personally Reviewed:    Consultant(s) Notes Reviewed:      Care Discussed with Consultants/Other Providers:

## 2019-12-28 PROCEDURE — 99232 SBSQ HOSP IP/OBS MODERATE 35: CPT

## 2019-12-28 RX ADMIN — LIDOCAINE 1 PATCH: 4 CREAM TOPICAL at 19:55

## 2019-12-28 RX ADMIN — AMLODIPINE BESYLATE 2.5 MILLIGRAM(S): 2.5 TABLET ORAL at 09:26

## 2019-12-28 RX ADMIN — LOSARTAN POTASSIUM 100 MILLIGRAM(S): 100 TABLET, FILM COATED ORAL at 09:27

## 2019-12-28 RX ADMIN — Medication 3 MILLILITER(S): at 20:34

## 2019-12-28 RX ADMIN — PREGABALIN 1000 MICROGRAM(S): 225 CAPSULE ORAL at 09:27

## 2019-12-28 RX ADMIN — Medication 5 MILLIGRAM(S): at 20:34

## 2019-12-28 RX ADMIN — Medication 0.5 MILLIGRAM(S): at 17:05

## 2019-12-28 RX ADMIN — DIVALPROEX SODIUM 250 MILLIGRAM(S): 500 TABLET, DELAYED RELEASE ORAL at 20:34

## 2019-12-28 RX ADMIN — OLANZAPINE 15 MILLIGRAM(S): 15 TABLET, FILM COATED ORAL at 20:33

## 2019-12-28 RX ADMIN — Medication 3 MILLIGRAM(S): at 20:34

## 2019-12-28 RX ADMIN — DIVALPROEX SODIUM 250 MILLIGRAM(S): 500 TABLET, DELAYED RELEASE ORAL at 09:27

## 2019-12-28 RX ADMIN — Medication 125 MICROGRAM(S): at 09:27

## 2019-12-28 RX ADMIN — MONTELUKAST 10 MILLIGRAM(S): 4 TABLET, CHEWABLE ORAL at 09:27

## 2019-12-28 RX ADMIN — GABAPENTIN 100 MILLIGRAM(S): 400 CAPSULE ORAL at 20:34

## 2019-12-28 RX ADMIN — CLOPIDOGREL BISULFATE 75 MILLIGRAM(S): 75 TABLET, FILM COATED ORAL at 09:26

## 2019-12-28 RX ADMIN — Medication 1 MILLIGRAM(S): at 09:27

## 2019-12-28 RX ADMIN — NYSTATIN CREAM 1 APPLICATION(S): 100000 CREAM TOPICAL at 20:33

## 2019-12-28 RX ADMIN — Medication 2000 UNIT(S): at 09:26

## 2019-12-28 RX ADMIN — LIDOCAINE 1 PATCH: 4 CREAM TOPICAL at 09:27

## 2019-12-28 RX ADMIN — Medication 2 MILLIGRAM(S): at 20:33

## 2019-12-28 RX ADMIN — Medication 3 MILLILITER(S): at 12:13

## 2019-12-28 RX ADMIN — GABAPENTIN 100 MILLIGRAM(S): 400 CAPSULE ORAL at 09:27

## 2019-12-28 RX ADMIN — Medication 0.5 MILLIGRAM(S): at 09:36

## 2019-12-28 RX ADMIN — Medication 5 MILLIGRAM(S): at 09:26

## 2019-12-28 RX ADMIN — ENOXAPARIN SODIUM 40 MILLIGRAM(S): 100 INJECTION SUBCUTANEOUS at 09:27

## 2019-12-28 RX ADMIN — ATORVASTATIN CALCIUM 40 MILLIGRAM(S): 80 TABLET, FILM COATED ORAL at 20:34

## 2019-12-28 RX ADMIN — Medication 5 MILLIGRAM(S): at 12:13

## 2019-12-28 RX ADMIN — LIDOCAINE 1 PATCH: 4 CREAM TOPICAL at 22:17

## 2019-12-28 RX ADMIN — Medication 2 MILLIGRAM(S): at 09:33

## 2019-12-28 RX ADMIN — Medication 3 MILLILITER(S): at 06:36

## 2019-12-28 RX ADMIN — NYSTATIN CREAM 1 APPLICATION(S): 100000 CREAM TOPICAL at 10:56

## 2019-12-28 RX ADMIN — GABAPENTIN 100 MILLIGRAM(S): 400 CAPSULE ORAL at 12:13

## 2019-12-28 RX ADMIN — HALOPERIDOL DECANOATE 0.5 MILLIGRAM(S): 100 INJECTION INTRAMUSCULAR at 17:05

## 2019-12-29 PROCEDURE — 99232 SBSQ HOSP IP/OBS MODERATE 35: CPT

## 2019-12-29 RX ADMIN — Medication 1 SPRAY(S): at 09:43

## 2019-12-29 RX ADMIN — Medication 2 MILLIGRAM(S): at 08:58

## 2019-12-29 RX ADMIN — ATORVASTATIN CALCIUM 40 MILLIGRAM(S): 80 TABLET, FILM COATED ORAL at 22:39

## 2019-12-29 RX ADMIN — DIVALPROEX SODIUM 250 MILLIGRAM(S): 500 TABLET, DELAYED RELEASE ORAL at 08:58

## 2019-12-29 RX ADMIN — GABAPENTIN 100 MILLIGRAM(S): 400 CAPSULE ORAL at 21:47

## 2019-12-29 RX ADMIN — LOSARTAN POTASSIUM 100 MILLIGRAM(S): 100 TABLET, FILM COATED ORAL at 08:58

## 2019-12-29 RX ADMIN — LIDOCAINE 1 PATCH: 4 CREAM TOPICAL at 22:24

## 2019-12-29 RX ADMIN — CLOPIDOGREL BISULFATE 75 MILLIGRAM(S): 75 TABLET, FILM COATED ORAL at 08:58

## 2019-12-29 RX ADMIN — NYSTATIN CREAM 1 APPLICATION(S): 100000 CREAM TOPICAL at 22:25

## 2019-12-29 RX ADMIN — Medication 2 MILLIGRAM(S): at 21:48

## 2019-12-29 RX ADMIN — NYSTATIN CREAM 1 APPLICATION(S): 100000 CREAM TOPICAL at 09:43

## 2019-12-29 RX ADMIN — LIDOCAINE 1 PATCH: 4 CREAM TOPICAL at 19:47

## 2019-12-29 RX ADMIN — Medication 2000 UNIT(S): at 09:58

## 2019-12-29 RX ADMIN — MONTELUKAST 10 MILLIGRAM(S): 4 TABLET, CHEWABLE ORAL at 08:58

## 2019-12-29 RX ADMIN — AMLODIPINE BESYLATE 2.5 MILLIGRAM(S): 2.5 TABLET ORAL at 08:58

## 2019-12-29 RX ADMIN — OLANZAPINE 15 MILLIGRAM(S): 15 TABLET, FILM COATED ORAL at 21:48

## 2019-12-29 RX ADMIN — ENOXAPARIN SODIUM 40 MILLIGRAM(S): 100 INJECTION SUBCUTANEOUS at 09:48

## 2019-12-29 RX ADMIN — Medication 5 MILLIGRAM(S): at 08:58

## 2019-12-29 RX ADMIN — GABAPENTIN 100 MILLIGRAM(S): 400 CAPSULE ORAL at 08:58

## 2019-12-29 RX ADMIN — Medication 5 MILLIGRAM(S): at 22:39

## 2019-12-29 RX ADMIN — DIVALPROEX SODIUM 250 MILLIGRAM(S): 500 TABLET, DELAYED RELEASE ORAL at 21:47

## 2019-12-29 RX ADMIN — PREGABALIN 1000 MICROGRAM(S): 225 CAPSULE ORAL at 09:59

## 2019-12-29 RX ADMIN — Medication 3 MILLILITER(S): at 22:35

## 2019-12-29 RX ADMIN — Medication 5 MILLIGRAM(S): at 12:40

## 2019-12-29 RX ADMIN — GABAPENTIN 100 MILLIGRAM(S): 400 CAPSULE ORAL at 12:40

## 2019-12-29 RX ADMIN — Medication 1 MILLIGRAM(S): at 09:43

## 2019-12-29 RX ADMIN — PANTOPRAZOLE SODIUM 40 MILLIGRAM(S): 20 TABLET, DELAYED RELEASE ORAL at 08:58

## 2019-12-29 RX ADMIN — Medication 125 MICROGRAM(S): at 08:58

## 2019-12-29 RX ADMIN — Medication 3 MILLIGRAM(S): at 21:48

## 2019-12-29 RX ADMIN — LIDOCAINE 1 PATCH: 4 CREAM TOPICAL at 09:43

## 2019-12-30 LAB
ANION GAP SERPL CALC-SCNC: 12 MMO/L — SIGNIFICANT CHANGE UP (ref 7–14)
BUN SERPL-MCNC: 6 MG/DL — LOW (ref 7–23)
CALCIUM SERPL-MCNC: 9.9 MG/DL — SIGNIFICANT CHANGE UP (ref 8.4–10.5)
CHLORIDE SERPL-SCNC: 106 MMOL/L — SIGNIFICANT CHANGE UP (ref 98–107)
CO2 SERPL-SCNC: 23 MMOL/L — SIGNIFICANT CHANGE UP (ref 22–31)
CREAT SERPL-MCNC: 0.53 MG/DL — SIGNIFICANT CHANGE UP (ref 0.5–1.3)
GLUCOSE SERPL-MCNC: 87 MG/DL — SIGNIFICANT CHANGE UP (ref 70–99)
POTASSIUM SERPL-MCNC: 3.8 MMOL/L — SIGNIFICANT CHANGE UP (ref 3.5–5.3)
POTASSIUM SERPL-SCNC: 3.8 MMOL/L — SIGNIFICANT CHANGE UP (ref 3.5–5.3)
SODIUM SERPL-SCNC: 141 MMOL/L — SIGNIFICANT CHANGE UP (ref 135–145)

## 2019-12-30 PROCEDURE — 99232 SBSQ HOSP IP/OBS MODERATE 35: CPT | Mod: GC

## 2019-12-30 RX ORDER — ARIPIPRAZOLE 15 MG/1
1 TABLET ORAL
Qty: 30 | Refills: 0
Start: 2019-12-30 | End: 2020-01-28

## 2019-12-30 RX ORDER — OLANZAPINE 15 MG/1
10 TABLET, FILM COATED ORAL
Refills: 0 | Status: DISCONTINUED | OUTPATIENT
Start: 2019-12-30 | End: 2019-12-30

## 2019-12-30 RX ORDER — OLANZAPINE 15 MG/1
10 TABLET, FILM COATED ORAL
Refills: 0 | Status: DISCONTINUED | OUTPATIENT
Start: 2019-12-30 | End: 2020-01-30

## 2019-12-30 RX ADMIN — Medication 5 MILLIGRAM(S): at 12:56

## 2019-12-30 RX ADMIN — Medication 2000 UNIT(S): at 09:11

## 2019-12-30 RX ADMIN — NYSTATIN CREAM 1 APPLICATION(S): 100000 CREAM TOPICAL at 09:11

## 2019-12-30 RX ADMIN — ATORVASTATIN CALCIUM 40 MILLIGRAM(S): 80 TABLET, FILM COATED ORAL at 20:58

## 2019-12-30 RX ADMIN — NYSTATIN CREAM 1 APPLICATION(S): 100000 CREAM TOPICAL at 20:58

## 2019-12-30 RX ADMIN — Medication 5 MILLIGRAM(S): at 20:58

## 2019-12-30 RX ADMIN — Medication 3 MILLIGRAM(S): at 20:58

## 2019-12-30 RX ADMIN — Medication 1 SPRAY(S): at 20:58

## 2019-12-30 RX ADMIN — ENOXAPARIN SODIUM 40 MILLIGRAM(S): 100 INJECTION SUBCUTANEOUS at 09:32

## 2019-12-30 RX ADMIN — LOSARTAN POTASSIUM 100 MILLIGRAM(S): 100 TABLET, FILM COATED ORAL at 09:11

## 2019-12-30 RX ADMIN — PREGABALIN 1000 MICROGRAM(S): 225 CAPSULE ORAL at 09:26

## 2019-12-30 RX ADMIN — LIDOCAINE 1 PATCH: 4 CREAM TOPICAL at 22:23

## 2019-12-30 RX ADMIN — GABAPENTIN 100 MILLIGRAM(S): 400 CAPSULE ORAL at 09:11

## 2019-12-30 RX ADMIN — AMLODIPINE BESYLATE 2.5 MILLIGRAM(S): 2.5 TABLET ORAL at 09:10

## 2019-12-30 RX ADMIN — OLANZAPINE 10 MILLIGRAM(S): 15 TABLET, FILM COATED ORAL at 20:58

## 2019-12-30 RX ADMIN — Medication 0.5 MILLIGRAM(S): at 14:57

## 2019-12-30 RX ADMIN — GABAPENTIN 100 MILLIGRAM(S): 400 CAPSULE ORAL at 12:56

## 2019-12-30 RX ADMIN — Medication 2 MILLIGRAM(S): at 09:11

## 2019-12-30 RX ADMIN — Medication 125 MICROGRAM(S): at 09:11

## 2019-12-30 RX ADMIN — DIVALPROEX SODIUM 250 MILLIGRAM(S): 500 TABLET, DELAYED RELEASE ORAL at 09:11

## 2019-12-30 RX ADMIN — MONTELUKAST 10 MILLIGRAM(S): 4 TABLET, CHEWABLE ORAL at 09:11

## 2019-12-30 RX ADMIN — LIDOCAINE 1 PATCH: 4 CREAM TOPICAL at 09:27

## 2019-12-30 RX ADMIN — GABAPENTIN 100 MILLIGRAM(S): 400 CAPSULE ORAL at 20:58

## 2019-12-30 RX ADMIN — CLOPIDOGREL BISULFATE 75 MILLIGRAM(S): 75 TABLET, FILM COATED ORAL at 09:11

## 2019-12-30 RX ADMIN — Medication 5 MILLIGRAM(S): at 09:11

## 2019-12-30 RX ADMIN — Medication 1 MILLIGRAM(S): at 09:11

## 2019-12-31 PROCEDURE — 99232 SBSQ HOSP IP/OBS MODERATE 35: CPT | Mod: GC

## 2019-12-31 RX ORDER — HALOPERIDOL DECANOATE 100 MG/ML
0.5 INJECTION INTRAMUSCULAR EVERY 6 HOURS
Refills: 0 | Status: DISCONTINUED | OUTPATIENT
Start: 2019-12-31 | End: 2020-01-06

## 2019-12-31 RX ADMIN — OLANZAPINE 10 MILLIGRAM(S): 15 TABLET, FILM COATED ORAL at 09:34

## 2019-12-31 RX ADMIN — GABAPENTIN 100 MILLIGRAM(S): 400 CAPSULE ORAL at 09:34

## 2019-12-31 RX ADMIN — Medication 650 MILLIGRAM(S): at 03:15

## 2019-12-31 RX ADMIN — Medication 0.5 MILLIGRAM(S): at 10:22

## 2019-12-31 RX ADMIN — AMLODIPINE BESYLATE 2.5 MILLIGRAM(S): 2.5 TABLET ORAL at 09:33

## 2019-12-31 RX ADMIN — Medication 2000 UNIT(S): at 09:33

## 2019-12-31 RX ADMIN — Medication 3 MILLIGRAM(S): at 22:35

## 2019-12-31 RX ADMIN — Medication 5 MILLIGRAM(S): at 22:35

## 2019-12-31 RX ADMIN — HALOPERIDOL DECANOATE 0.5 MILLIGRAM(S): 100 INJECTION INTRAMUSCULAR at 15:55

## 2019-12-31 RX ADMIN — NYSTATIN CREAM 1 APPLICATION(S): 100000 CREAM TOPICAL at 09:35

## 2019-12-31 RX ADMIN — MONTELUKAST 10 MILLIGRAM(S): 4 TABLET, CHEWABLE ORAL at 09:34

## 2019-12-31 RX ADMIN — PREGABALIN 1000 MICROGRAM(S): 225 CAPSULE ORAL at 09:33

## 2019-12-31 RX ADMIN — ENOXAPARIN SODIUM 40 MILLIGRAM(S): 100 INJECTION SUBCUTANEOUS at 09:33

## 2019-12-31 RX ADMIN — ATORVASTATIN CALCIUM 40 MILLIGRAM(S): 80 TABLET, FILM COATED ORAL at 22:35

## 2019-12-31 RX ADMIN — Medication 5 MILLIGRAM(S): at 12:16

## 2019-12-31 RX ADMIN — Medication 125 MICROGRAM(S): at 09:34

## 2019-12-31 RX ADMIN — CLOPIDOGREL BISULFATE 75 MILLIGRAM(S): 75 TABLET, FILM COATED ORAL at 09:33

## 2019-12-31 RX ADMIN — NYSTATIN CREAM 1 APPLICATION(S): 100000 CREAM TOPICAL at 22:35

## 2019-12-31 RX ADMIN — LOSARTAN POTASSIUM 100 MILLIGRAM(S): 100 TABLET, FILM COATED ORAL at 09:34

## 2019-12-31 RX ADMIN — Medication 1 MILLIGRAM(S): at 09:34

## 2019-12-31 RX ADMIN — OLANZAPINE 10 MILLIGRAM(S): 15 TABLET, FILM COATED ORAL at 22:35

## 2019-12-31 RX ADMIN — LIDOCAINE 1 PATCH: 4 CREAM TOPICAL at 09:34

## 2019-12-31 RX ADMIN — Medication 1 SPRAY(S): at 22:35

## 2019-12-31 RX ADMIN — Medication 650 MILLIGRAM(S): at 04:22

## 2019-12-31 RX ADMIN — Medication 5 MILLIGRAM(S): at 09:33

## 2020-01-01 PROCEDURE — 99232 SBSQ HOSP IP/OBS MODERATE 35: CPT

## 2020-01-01 RX ADMIN — HALOPERIDOL DECANOATE 0.5 MILLIGRAM(S): 100 INJECTION INTRAMUSCULAR at 08:35

## 2020-01-01 RX ADMIN — LIDOCAINE 1 PATCH: 4 CREAM TOPICAL at 08:34

## 2020-01-01 RX ADMIN — Medication 1 MILLIGRAM(S): at 08:34

## 2020-01-01 RX ADMIN — Medication 5 MILLIGRAM(S): at 20:56

## 2020-01-01 RX ADMIN — PREGABALIN 1000 MICROGRAM(S): 225 CAPSULE ORAL at 08:34

## 2020-01-01 RX ADMIN — MONTELUKAST 10 MILLIGRAM(S): 4 TABLET, CHEWABLE ORAL at 08:34

## 2020-01-01 RX ADMIN — Medication 5 MILLIGRAM(S): at 13:17

## 2020-01-01 RX ADMIN — LIDOCAINE 1 PATCH: 4 CREAM TOPICAL at 22:18

## 2020-01-01 RX ADMIN — NYSTATIN CREAM 1 APPLICATION(S): 100000 CREAM TOPICAL at 08:34

## 2020-01-01 RX ADMIN — ATORVASTATIN CALCIUM 40 MILLIGRAM(S): 80 TABLET, FILM COATED ORAL at 20:56

## 2020-01-01 RX ADMIN — CLOPIDOGREL BISULFATE 75 MILLIGRAM(S): 75 TABLET, FILM COATED ORAL at 08:33

## 2020-01-01 RX ADMIN — NYSTATIN CREAM 1 APPLICATION(S): 100000 CREAM TOPICAL at 21:06

## 2020-01-01 RX ADMIN — Medication 3 MILLILITER(S): at 00:08

## 2020-01-01 RX ADMIN — OLANZAPINE 10 MILLIGRAM(S): 15 TABLET, FILM COATED ORAL at 20:06

## 2020-01-01 RX ADMIN — Medication 2000 UNIT(S): at 08:33

## 2020-01-01 RX ADMIN — LIDOCAINE 1 PATCH: 4 CREAM TOPICAL at 19:57

## 2020-01-01 RX ADMIN — Medication 3 MILLIGRAM(S): at 20:56

## 2020-01-01 RX ADMIN — OLANZAPINE 10 MILLIGRAM(S): 15 TABLET, FILM COATED ORAL at 08:35

## 2020-01-01 RX ADMIN — Medication 5 MILLIGRAM(S): at 08:33

## 2020-01-01 RX ADMIN — LOSARTAN POTASSIUM 100 MILLIGRAM(S): 100 TABLET, FILM COATED ORAL at 08:34

## 2020-01-01 RX ADMIN — AMLODIPINE BESYLATE 2.5 MILLIGRAM(S): 2.5 TABLET ORAL at 08:33

## 2020-01-01 RX ADMIN — Medication 125 MICROGRAM(S): at 08:34

## 2020-01-01 RX ADMIN — HALOPERIDOL DECANOATE 0.5 MILLIGRAM(S): 100 INJECTION INTRAMUSCULAR at 14:40

## 2020-01-02 PROCEDURE — 99232 SBSQ HOSP IP/OBS MODERATE 35: CPT | Mod: GC

## 2020-01-02 PROCEDURE — 99232 SBSQ HOSP IP/OBS MODERATE 35: CPT

## 2020-01-02 RX ORDER — FUROSEMIDE 40 MG
20 TABLET ORAL DAILY
Refills: 0 | Status: COMPLETED | OUTPATIENT
Start: 2020-01-02 | End: 2020-01-03

## 2020-01-02 RX ORDER — LOPERAMIDE HCL 2 MG
2 TABLET ORAL
Refills: 0 | Status: DISCONTINUED | OUTPATIENT
Start: 2020-01-02 | End: 2020-01-30

## 2020-01-02 RX ORDER — LOPERAMIDE HCL 2 MG
4 TABLET ORAL ONCE
Refills: 0 | Status: COMPLETED | OUTPATIENT
Start: 2020-01-02 | End: 2020-01-02

## 2020-01-02 RX ORDER — LOPERAMIDE HCL 2 MG
2 TABLET ORAL
Refills: 0 | Status: DISCONTINUED | OUTPATIENT
Start: 2020-01-02 | End: 2020-01-02

## 2020-01-02 RX ADMIN — Medication 3 MILLILITER(S): at 22:42

## 2020-01-02 RX ADMIN — LIDOCAINE 1 PATCH: 4 CREAM TOPICAL at 10:25

## 2020-01-02 RX ADMIN — OLANZAPINE 10 MILLIGRAM(S): 15 TABLET, FILM COATED ORAL at 10:25

## 2020-01-02 RX ADMIN — LOSARTAN POTASSIUM 100 MILLIGRAM(S): 100 TABLET, FILM COATED ORAL at 10:25

## 2020-01-02 RX ADMIN — Medication 5 MILLIGRAM(S): at 21:17

## 2020-01-02 RX ADMIN — Medication 5 MILLIGRAM(S): at 10:24

## 2020-01-02 RX ADMIN — PREGABALIN 1000 MICROGRAM(S): 225 CAPSULE ORAL at 10:24

## 2020-01-02 RX ADMIN — Medication 20 MILLIGRAM(S): at 16:17

## 2020-01-02 RX ADMIN — Medication 4 MILLIGRAM(S): at 13:24

## 2020-01-02 RX ADMIN — HALOPERIDOL DECANOATE 0.5 MILLIGRAM(S): 100 INJECTION INTRAMUSCULAR at 14:09

## 2020-01-02 RX ADMIN — Medication 125 MICROGRAM(S): at 10:24

## 2020-01-02 RX ADMIN — Medication 3 MILLIGRAM(S): at 21:16

## 2020-01-02 RX ADMIN — Medication 650 MILLIGRAM(S): at 00:15

## 2020-01-02 RX ADMIN — OLANZAPINE 10 MILLIGRAM(S): 15 TABLET, FILM COATED ORAL at 21:16

## 2020-01-02 RX ADMIN — ATORVASTATIN CALCIUM 40 MILLIGRAM(S): 80 TABLET, FILM COATED ORAL at 21:17

## 2020-01-02 RX ADMIN — LIDOCAINE 1 PATCH: 4 CREAM TOPICAL at 21:18

## 2020-01-02 RX ADMIN — AMLODIPINE BESYLATE 2.5 MILLIGRAM(S): 2.5 TABLET ORAL at 10:23

## 2020-01-02 RX ADMIN — PANTOPRAZOLE SODIUM 40 MILLIGRAM(S): 20 TABLET, DELAYED RELEASE ORAL at 10:25

## 2020-01-02 RX ADMIN — MONTELUKAST 10 MILLIGRAM(S): 4 TABLET, CHEWABLE ORAL at 10:25

## 2020-01-02 RX ADMIN — Medication 2000 UNIT(S): at 10:24

## 2020-01-02 RX ADMIN — Medication 1 MILLIGRAM(S): at 10:24

## 2020-01-02 RX ADMIN — Medication 650 MILLIGRAM(S): at 01:20

## 2020-01-02 RX ADMIN — Medication 3 MILLILITER(S): at 13:24

## 2020-01-02 RX ADMIN — Medication 5 MILLIGRAM(S): at 14:09

## 2020-01-02 RX ADMIN — CLOPIDOGREL BISULFATE 75 MILLIGRAM(S): 75 TABLET, FILM COATED ORAL at 10:24

## 2020-01-02 NOTE — CHART NOTE - NSCHARTNOTEFT_GEN_A_CORE
CC: Chest tightness  HPI: 69 year old female with PMH of HTN, HLD, NOEMY/obesity, Asthma/COPD?, DVT (not on AC now), breast and kidney CA (ABDIRAHMAN now), hypothyroidism/graves disease?, chronic pain s/p laminectomy, carpal tunnel, s/p admission to [facility] for cellulitis and bacteruria. Pt complaining of chest tightness, SOB x 20 min. Pt reports worsens with deep inspiration. Denies any radiation. Denies any lightheadedness, dizziness, headache, change in vision, N/V. VSS.    Physical Exam  GENERAL APPEARANCE: Well developed, well nourished, alert and cooperative, and appears to be in no acute distress.  HEAD: normocephalic.  EYES: PERRL, EOMI.  CARDIAC: Normal S1 and S2. No S3, S4 or murmurs. Rhythm is regular.   LUNGS: Clear to auscultation and percussion without rales, rhonchi, wheezing or diminished breath sounds.  ABDOMEN: Positive bowel sounds. Soft, nondistended, nontender. No guarding or rebound.     NEUROLOGICAL: CN II-XII intact. Strength and sensation symmetric and intact throughout.      EKG: Normal sinus rhythm. No ST elevation or depression or signs of ischemia.     Assessment/Plan  1) Chest tightness due to Asthma exacerbation. pt advised to start duoneb therapy. Continue to monitor pt in dayroom overnight.   2) #LE edema: likely chronic, ?lymphedema. Has been on lasix/torsemide in the past  -Recommend Lasix 20mg daily x2 days, reassess need again on Saturday  -Compression stockings/elevate legs

## 2020-01-02 NOTE — PROGRESS NOTE ADULT - ASSESSMENT
69F w/HTN, HLD, NOEMY/obesity, Asthma/COPD?, DVT (not on AC now), breast and kidney CA (ABDIRAHMAN now), hypothyroidism/graves disease?, chronic pain s/p laminectomy, carpal tunnel, s/p admission to [facility] for cellulitis and bacteruria. Patient's hospitalization c/b diarrhea, likely secondary to antibiotics. The patient was subsequently transferred to [facility] b/o paranoid delusions  Medicine consulted for LE edema worsened over past 2 days.    #LE edema: likely chronic, ?lymphedema. Has been on lasix/torsemide in the past  -Recommend Lasix 20mg daily x2 days, reassess need again on Saturday  -Compression stockings/elevate legs   -Pt seen to be hoarding 3 water bottles in recliner, would limit PO intake, ansley since pt mostly seated  -IF no improvement on diuretic, would consider LE dopplers to r/o DVT    #Asthma/COPD: no wheezing  -C/w current management     #Hypothyroidism  -C/w synthroid 125 mcg  -Last TSH normal, periodic outpatient follow up recommended    #HTN  -Continue current meds (amlodipine and losartan)  -Blood pressure will be monitored and if needed medications will be adjusted accordingly    #Chronic pain  -Patient was prescribed opioids by outpatient doctors  -Continue current oxycodone as per [facility] records. Please attempt to touch base with outpatient doctor to determine indications

## 2020-01-02 NOTE — PROGRESS NOTE ADULT - SUBJECTIVE AND OBJECTIVE BOX
CC/Reason for Consult: LE edema     SUBJECTIVE / OVERNIGHT EVENTS: Medicine called for LE edema. Pt was at [facility] prior to admission to [facility] and and was treated with abx for LE cellulitis. Pt reports known chronic LE edema, no pain. Pt reports being on Lasix in the past. Per pharm/chart review, pt has been prescribed torsemide and lasix in the past.         MEDICATIONS  (STANDING):  amLODIPine   Tablet 2.5 milliGRAM(s) Oral daily  atorvastatin 40 milliGRAM(s) Oral at bedtime  baclofen 5 milliGRAM(s) Oral three times a day  cholecalciferol 2000 Unit(s) Oral daily  clopidogrel Tablet 75 milliGRAM(s) Oral daily  cyanocobalamin 1000 MICROGram(s) Oral daily  enoxaparin Injectable 40 milliGRAM(s) SubCutaneous daily  fluticasone propionate 50 MICROgram(s)/spray Nasal Spray 1 Spray(s) Both Nostrils two times a day  folic acid 1 milliGRAM(s) Oral daily  levothyroxine 125 MICROGram(s) Oral daily  lidocaine   Patch 1 Patch Transdermal daily  losartan 100 milliGRAM(s) Oral daily  melatonin. 3 milliGRAM(s) Oral at bedtime  montelukast 10 milliGRAM(s) Oral daily  nystatin Powder 1 Application(s) Topical two times a day  OLANZapine 10 milliGRAM(s) Oral two times a day  pantoprazole    Tablet 40 milliGRAM(s) Oral before breakfast    MEDICATIONS  (PRN):  acetaminophen   Tablet .. 650 milliGRAM(s) Oral every 6 hours PRN Temp greater or equal to 38C (100.4F), Moderate Pain (4 - 6), Severe Pain (7 - 10)  albuterol/ipratropium for Nebulization. 3 milliLiter(s) Nebulizer every 6 hours PRN asthma  haloperidol     Tablet 0.5 milliGRAM(s) Oral every 6 hours PRN agitation/anxiety  haloperidol    Injectable 0.5 milliGRAM(s) IntraMuscular once PRN combative behavior  loperamide 2 milliGRAM(s) Oral every 2 hours PRN diarrhea      Vital Signs Last 24 Hrs  T(C): 37.4 (02 Jan 2020 06:41), Max: 37.4 (02 Jan 2020 06:41)  T(F): 99.4 (02 Jan 2020 06:41), Max: 99.4 (02 Jan 2020 06:41)  HR: --  BP: --  BP(mean): --  RR: --  SpO2: --  CAPILLARY BLOOD GLUCOSE            PHYSICAL EXAM:  GENERAL: NAD, well-developed  HEAD:  Atraumatic, Normocephalic  EYES: EOMI, conjunctiva and sclera clear  NECK: Supple, No JVD  CHEST/LUNG: Clear to auscultation bilaterally; No wheeze  HEART: Regular rate and rhythm; No murmurs, rubs, or gallops  ABDOMEN: Soft, Nontender, Nondistended; Bowel sounds present  EXTREMITIES:  2+ Peripheral Pulses, No clubbing, cyanosis, +tense edema of b/l LE likely 2/2 chronic lymphedema, no TTP or erythema noted   PSYCH: AAOx3  NEUROLOGY: non-focal  SKIN: No rashes or lesions    LABS:                    RADIOLOGY & ADDITIONAL TESTS:    Imaging Personally Reviewed:    Consultant(s) Notes Reviewed:      Care Discussed with Consultants/Other Providers: Dr. Day

## 2020-01-03 PROCEDURE — 99233 SBSQ HOSP IP/OBS HIGH 50: CPT

## 2020-01-03 PROCEDURE — 99232 SBSQ HOSP IP/OBS MODERATE 35: CPT | Mod: GC

## 2020-01-03 RX ORDER — ASPIRIN/CALCIUM CARB/MAGNESIUM 324 MG
81 TABLET ORAL DAILY
Refills: 0 | Status: DISCONTINUED | OUTPATIENT
Start: 2020-01-03 | End: 2020-01-30

## 2020-01-03 RX ORDER — FUROSEMIDE 40 MG
20 TABLET ORAL DAILY
Refills: 0 | Status: DISCONTINUED | OUTPATIENT
Start: 2020-01-04 | End: 2020-01-30

## 2020-01-03 RX ORDER — TIOTROPIUM BROMIDE 18 UG/1
1 CAPSULE ORAL; RESPIRATORY (INHALATION) DAILY
Refills: 0 | Status: DISCONTINUED | OUTPATIENT
Start: 2020-01-03 | End: 2020-01-30

## 2020-01-03 RX ADMIN — Medication 20 MILLIGRAM(S): at 10:31

## 2020-01-03 RX ADMIN — LIDOCAINE 1 PATCH: 4 CREAM TOPICAL at 06:24

## 2020-01-03 RX ADMIN — LIDOCAINE 1 PATCH: 4 CREAM TOPICAL at 21:46

## 2020-01-03 RX ADMIN — Medication 5 MILLIGRAM(S): at 10:30

## 2020-01-03 RX ADMIN — Medication 3 MILLIGRAM(S): at 21:43

## 2020-01-03 RX ADMIN — Medication 125 MICROGRAM(S): at 10:31

## 2020-01-03 RX ADMIN — Medication 5 MILLIGRAM(S): at 21:45

## 2020-01-03 RX ADMIN — ATORVASTATIN CALCIUM 40 MILLIGRAM(S): 80 TABLET, FILM COATED ORAL at 21:45

## 2020-01-03 RX ADMIN — LIDOCAINE 1 PATCH: 4 CREAM TOPICAL at 18:19

## 2020-01-03 RX ADMIN — CLOPIDOGREL BISULFATE 75 MILLIGRAM(S): 75 TABLET, FILM COATED ORAL at 10:41

## 2020-01-03 RX ADMIN — AMLODIPINE BESYLATE 2.5 MILLIGRAM(S): 2.5 TABLET ORAL at 10:30

## 2020-01-03 RX ADMIN — HALOPERIDOL DECANOATE 0.5 MILLIGRAM(S): 100 INJECTION INTRAMUSCULAR at 17:44

## 2020-01-03 RX ADMIN — Medication 5 MILLIGRAM(S): at 12:13

## 2020-01-03 RX ADMIN — OLANZAPINE 10 MILLIGRAM(S): 15 TABLET, FILM COATED ORAL at 21:43

## 2020-01-03 RX ADMIN — OLANZAPINE 10 MILLIGRAM(S): 15 TABLET, FILM COATED ORAL at 10:30

## 2020-01-03 RX ADMIN — LIDOCAINE 1 PATCH: 4 CREAM TOPICAL at 10:31

## 2020-01-03 RX ADMIN — Medication 3 MILLILITER(S): at 22:37

## 2020-01-03 RX ADMIN — Medication 40 MILLIGRAM(S): at 12:56

## 2020-01-03 RX ADMIN — MONTELUKAST 10 MILLIGRAM(S): 4 TABLET, CHEWABLE ORAL at 10:30

## 2020-01-03 RX ADMIN — LOSARTAN POTASSIUM 100 MILLIGRAM(S): 100 TABLET, FILM COATED ORAL at 10:32

## 2020-01-03 RX ADMIN — Medication 2000 UNIT(S): at 10:31

## 2020-01-03 RX ADMIN — Medication 1 MILLIGRAM(S): at 10:31

## 2020-01-03 RX ADMIN — PREGABALIN 1000 MICROGRAM(S): 225 CAPSULE ORAL at 10:31

## 2020-01-03 NOTE — PROGRESS NOTE ADULT - SUBJECTIVE AND OBJECTIVE BOX
CC/Reason for Consult: LE edema, sob     OVERNIGHT EVENTS: Per o/n note, "Pt complaining of chest tightness, SOB x 20 min. Pt reports worsens with deep inspiration. Denies any radiation. Denies any lightheadedness, dizziness, headache, change in vision, N/V. VSS." EKG NSR, given duoneb       MEDICATIONS  (STANDING):  amLODIPine   Tablet 2.5 milliGRAM(s) Oral daily  atorvastatin 40 milliGRAM(s) Oral at bedtime  baclofen 5 milliGRAM(s) Oral three times a day  cholecalciferol 2000 Unit(s) Oral daily  clopidogrel Tablet 75 milliGRAM(s) Oral daily  cyanocobalamin 1000 MICROGram(s) Oral daily  enoxaparin Injectable 40 milliGRAM(s) SubCutaneous daily  fluticasone propionate 50 MICROgram(s)/spray Nasal Spray 1 Spray(s) Both Nostrils two times a day  folic acid 1 milliGRAM(s) Oral daily  furosemide    Tablet 20 milliGRAM(s) Oral daily  levothyroxine 125 MICROGram(s) Oral daily  lidocaine   Patch 1 Patch Transdermal daily  losartan 100 milliGRAM(s) Oral daily  melatonin. 3 milliGRAM(s) Oral at bedtime  montelukast 10 milliGRAM(s) Oral daily  nystatin Powder 1 Application(s) Topical two times a day  OLANZapine 10 milliGRAM(s) Oral two times a day  pantoprazole    Tablet 40 milliGRAM(s) Oral before breakfast    MEDICATIONS  (PRN):  acetaminophen   Tablet .. 650 milliGRAM(s) Oral every 6 hours PRN Temp greater or equal to 38C (100.4F), Moderate Pain (4 - 6), Severe Pain (7 - 10)  albuterol/ipratropium for Nebulization. 3 milliLiter(s) Nebulizer every 6 hours PRN asthma  haloperidol     Tablet 0.5 milliGRAM(s) Oral every 6 hours PRN agitation/anxiety  haloperidol    Injectable 0.5 milliGRAM(s) IntraMuscular once PRN combative behavior  loperamide 2 milliGRAM(s) Oral every 2 hours PRN diarrhea      Vital Signs Last 24 Hrs  T(C): 36.8 (03 Jan 2020 07:56), Max: 36.9 (02 Jan 2020 15:40)  T(F): 98.2 (03 Jan 2020 07:56), Max: 98.5 (02 Jan 2020 15:40)  HR: 114 (02 Jan 2020 22:10) (114 - 114)  BP: 143/64 (02 Jan 2020 22:10) (143/64 - 143/64)  BP(mean): --  RR: 95 (02 Jan 2020 22:10) (95 - 95)  SpO2: --  CAPILLARY BLOOD GLUCOSE            PHYSICAL EXAM:  GENERAL: NAD, well-developed  HEAD:  Atraumatic, Normocephalic  EYES: EOMI, conjunctiva and sclera clear  NECK: Supple, No JVD  CHEST/LUNG: Clear to auscultation bilaterally; No wheeze  HEART: Regular rate and rhythm; No murmurs, rubs, or gallops  ABDOMEN: Soft, Nontender, Nondistended; Bowel sounds present  EXTREMITIES:  2+ Peripheral Pulses, No clubbing, cyanosis, or edema  PSYCH: AAOx3  NEUROLOGY: non-focal  SKIN: No rashes or lesions    LABS:                    RADIOLOGY & ADDITIONAL TESTS:    Imaging Personally Reviewed:    Consultant(s) Notes Reviewed:      Care Discussed with Consultants/Other Providers: CC/Reason for Consult: LE edema, sob     OVERNIGHT EVENTS: Per o/n note, "Pt complaining of chest tightness, SOB x 20 min. Pt reports worsens with deep inspiration. Denies any radiation. Denies any lightheadedness, dizziness, headache, change in vision, N/V. VSS." EKG NSR, given duoneb   Pt reports feeling better after nebulizer treatment today, states she was on standing trelegy at home, also follows with pulmonologist, Dr. Neal Larry ([phone]) in , unclear why pt not on standing inhalers here. Also reports improvement in LE edema.     MEDICATIONS  (STANDING):  amLODIPine   Tablet 2.5 milliGRAM(s) Oral daily  atorvastatin 40 milliGRAM(s) Oral at bedtime  baclofen 5 milliGRAM(s) Oral three times a day  cholecalciferol 2000 Unit(s) Oral daily  clopidogrel Tablet 75 milliGRAM(s) Oral daily  cyanocobalamin 1000 MICROGram(s) Oral daily  enoxaparin Injectable 40 milliGRAM(s) SubCutaneous daily  fluticasone propionate 50 MICROgram(s)/spray Nasal Spray 1 Spray(s) Both Nostrils two times a day  folic acid 1 milliGRAM(s) Oral daily  furosemide    Tablet 20 milliGRAM(s) Oral daily  levothyroxine 125 MICROGram(s) Oral daily  lidocaine   Patch 1 Patch Transdermal daily  losartan 100 milliGRAM(s) Oral daily  melatonin. 3 milliGRAM(s) Oral at bedtime  montelukast 10 milliGRAM(s) Oral daily  nystatin Powder 1 Application(s) Topical two times a day  OLANZapine 10 milliGRAM(s) Oral two times a day  pantoprazole    Tablet 40 milliGRAM(s) Oral before breakfast    MEDICATIONS  (PRN):  acetaminophen   Tablet .. 650 milliGRAM(s) Oral every 6 hours PRN Temp greater or equal to 38C (100.4F), Moderate Pain (4 - 6), Severe Pain (7 - 10)  albuterol/ipratropium for Nebulization. 3 milliLiter(s) Nebulizer every 6 hours PRN asthma  haloperidol     Tablet 0.5 milliGRAM(s) Oral every 6 hours PRN agitation/anxiety  haloperidol    Injectable 0.5 milliGRAM(s) IntraMuscular once PRN combative behavior  loperamide 2 milliGRAM(s) Oral every 2 hours PRN diarrhea      Vital Signs Last 24 Hrs  T(C): 36.8 (03 Jan 2020 07:56), Max: 36.9 (02 Jan 2020 15:40)  T(F): 98.2 (03 Jan 2020 07:56), Max: 98.5 (02 Jan 2020 15:40)  HR: 114 (02 Jan 2020 22:10) (114 - 114)  BP: 143/64 (02 Jan 2020 22:10) (143/64 - 143/64)  BP(mean): --  RR: 95 (02 Jan 2020 22:10) (95 - 95)  SpO2: --  CAPILLARY BLOOD GLUCOSE            PHYSICAL EXAM:  GENERAL: NAD, well-developed  EYES: EOMI, conjunctiva and sclera clear  NECK: Supple, No JVD  CHEST/LUNG: +diffuse mild end exp wheezing, speaking in full sentences, no distress    HEART: Regular rate and rhythm; No murmurs, rubs, or gallops  ABDOMEN: Soft, Nontender, Nondistended; Bowel sounds present  EXTREMITIES: +improving edema of b/l LE likely 2/2 lymphedema   PSYCH: AAOx3  NEUROLOGY: non-focal  SKIN: No rashes or lesions    LABS:                    RADIOLOGY & ADDITIONAL TESTS:    Imaging Personally Reviewed:    Consultant(s) Notes Reviewed:      Care Discussed with Consultants/Other Providers: Dr. Day

## 2020-01-03 NOTE — PROGRESS NOTE ADULT - ASSESSMENT
69F w/HTN, HLD, NOEMY/obesity, Asthma/COPD?, DVT (not on AC now), breast and kidney CA (ABDIRAHMAN now), hypothyroidism/graves disease?, chronic pain s/p laminectomy, carpal tunnel, s/p admission to [facility] for cellulitis and bacteruria. Patient's hospitalization c/b diarrhea, likely secondary to antibiotics. The patient was subsequently transferred to [facility] b/o paranoid delusions  Medicine consulted for LE edema worsened over past 2 days.    #LE edema: likely chronic, ?lymphedema. Has been on lasix/torsemide in the past  -Recommend Lasix 20mg daily x2 days, reassess need again on Saturday  -Compression stockings/elevate legs   -Pt seen to be hoarding 3 water bottles in recliner, would limit PO intake, ansley since pt mostly seated  -IF no improvement on diuretic, would consider LE dopplers to r/o DVT    #Asthma/COPD: no wheezing  -C/w Duoneb q6     #Hypothyroidism  -C/w synthroid 125 mcg  -Last TSH normal, periodic outpatient follow up recommended    #HTN  -Continue current meds (amlodipine and losartan)  -Blood pressure will be monitored and if needed medications will be adjusted accordingly    #Chronic pain  -Patient was prescribed opioids by outpatient doctors  -Continue current oxycodone as per [facility] records. Please attempt to touch base with outpatient doctor to determine indications 69F w/HTN, HLD, NOEMY/obesity, Asthma/COPD?, DVT (not on AC now), breast and kidney CA (ABDIRAHMAN now), hypothyroidism/graves disease?, chronic pain s/p laminectomy, carpal tunnel, s/p admission to [facility] for cellulitis and bacteruria. Patient's hospitalization c/b diarrhea, likely secondary to antibiotics. The patient was subsequently transferred to [facility] b/o paranoid delusions  Medicine consulted for LE edema worsened over past 2 days.    #Asthma/COPD: pt reported chest tightness overnight, improved with duonebs, today exam with wheezing.  -Restart Spiriva daily (was on last hospital admission)  -C/w Duoneb q6   -Start Prednisone 40mg daily--per pharm med rec pt has been picking up prednisone 5-10mg for the past year, might be on chronic steroids for resp issues  -Attempted to reach out to pts pulmonologist, Dr. Larry ([phone]), however out of office until Monday. Will need to call to clarify steroid dosing and duration. Likely can start tapering next week, back to low dose.   -Pt reports being on Trelegy at home, however,  cannot bring in.     #LE edema: likely chronic, ?lymphedema. Improved on lasix   -C/w Lasix 20mg daily   -Compression stockings/elevate legs     #Hypothyroidism  -C/w synthroid 125 mcg  -Last TSH normal, periodic outpatient follow up recommended    #HTN  -Continue current meds (amlodipine and losartan)  -Blood pressure will be monitored and if needed medications will be adjusted accordingly    #Chronic pain  -Patient was prescribed opioids by outpatient doctors  -Continue current oxycodone as per [facility] records. Please attempt to touch base with outpatient doctor to determine indications

## 2020-01-04 PROCEDURE — 99231 SBSQ HOSP IP/OBS SF/LOW 25: CPT

## 2020-01-04 RX ADMIN — Medication 5 MILLIGRAM(S): at 14:19

## 2020-01-04 RX ADMIN — Medication 40 MILLIGRAM(S): at 06:59

## 2020-01-04 RX ADMIN — LIDOCAINE 1 PATCH: 4 CREAM TOPICAL at 23:00

## 2020-01-04 RX ADMIN — Medication 3 MILLILITER(S): at 06:15

## 2020-01-04 RX ADMIN — LIDOCAINE 1 PATCH: 4 CREAM TOPICAL at 11:12

## 2020-01-04 RX ADMIN — Medication 3 MILLIGRAM(S): at 21:41

## 2020-01-04 RX ADMIN — LOSARTAN POTASSIUM 100 MILLIGRAM(S): 100 TABLET, FILM COATED ORAL at 10:19

## 2020-01-04 RX ADMIN — Medication 2000 UNIT(S): at 10:18

## 2020-01-04 RX ADMIN — TIOTROPIUM BROMIDE 1 CAPSULE(S): 18 CAPSULE ORAL; RESPIRATORY (INHALATION) at 09:52

## 2020-01-04 RX ADMIN — LIDOCAINE 1 PATCH: 4 CREAM TOPICAL at 19:51

## 2020-01-04 RX ADMIN — NYSTATIN CREAM 1 APPLICATION(S): 100000 CREAM TOPICAL at 21:41

## 2020-01-04 RX ADMIN — ATORVASTATIN CALCIUM 40 MILLIGRAM(S): 80 TABLET, FILM COATED ORAL at 21:41

## 2020-01-04 RX ADMIN — NYSTATIN CREAM 1 APPLICATION(S): 100000 CREAM TOPICAL at 11:12

## 2020-01-04 RX ADMIN — Medication 125 MICROGRAM(S): at 10:19

## 2020-01-04 RX ADMIN — PREGABALIN 1000 MICROGRAM(S): 225 CAPSULE ORAL at 13:51

## 2020-01-04 RX ADMIN — AMLODIPINE BESYLATE 2.5 MILLIGRAM(S): 2.5 TABLET ORAL at 10:18

## 2020-01-04 RX ADMIN — OLANZAPINE 10 MILLIGRAM(S): 15 TABLET, FILM COATED ORAL at 21:41

## 2020-01-04 RX ADMIN — Medication 1 MILLIGRAM(S): at 10:19

## 2020-01-04 RX ADMIN — Medication 5 MILLIGRAM(S): at 10:18

## 2020-01-04 RX ADMIN — Medication 20 MILLIGRAM(S): at 10:19

## 2020-01-04 RX ADMIN — Medication 81 MILLIGRAM(S): at 10:18

## 2020-01-04 RX ADMIN — Medication 5 MILLIGRAM(S): at 21:41

## 2020-01-04 RX ADMIN — CLOPIDOGREL BISULFATE 75 MILLIGRAM(S): 75 TABLET, FILM COATED ORAL at 10:18

## 2020-01-04 RX ADMIN — MONTELUKAST 10 MILLIGRAM(S): 4 TABLET, CHEWABLE ORAL at 10:19

## 2020-01-04 RX ADMIN — OLANZAPINE 10 MILLIGRAM(S): 15 TABLET, FILM COATED ORAL at 10:19

## 2020-01-05 PROCEDURE — 99231 SBSQ HOSP IP/OBS SF/LOW 25: CPT

## 2020-01-05 RX ORDER — HALOPERIDOL DECANOATE 100 MG/ML
0.5 INJECTION INTRAMUSCULAR ONCE
Refills: 0 | Status: COMPLETED | OUTPATIENT
Start: 2020-01-05 | End: 2020-01-05

## 2020-01-05 RX ADMIN — LIDOCAINE 1 PATCH: 4 CREAM TOPICAL at 18:42

## 2020-01-05 RX ADMIN — LIDOCAINE 1 PATCH: 4 CREAM TOPICAL at 09:30

## 2020-01-05 RX ADMIN — Medication 1 MILLIGRAM(S): at 09:30

## 2020-01-05 RX ADMIN — Medication 81 MILLIGRAM(S): at 09:28

## 2020-01-05 RX ADMIN — HALOPERIDOL DECANOATE 0.5 MILLIGRAM(S): 100 INJECTION INTRAMUSCULAR at 17:08

## 2020-01-05 RX ADMIN — OLANZAPINE 10 MILLIGRAM(S): 15 TABLET, FILM COATED ORAL at 09:31

## 2020-01-05 RX ADMIN — Medication 125 MICROGRAM(S): at 09:30

## 2020-01-05 RX ADMIN — NYSTATIN CREAM 1 APPLICATION(S): 100000 CREAM TOPICAL at 10:12

## 2020-01-05 RX ADMIN — NYSTATIN CREAM 1 APPLICATION(S): 100000 CREAM TOPICAL at 21:23

## 2020-01-05 RX ADMIN — LOSARTAN POTASSIUM 100 MILLIGRAM(S): 100 TABLET, FILM COATED ORAL at 09:31

## 2020-01-05 RX ADMIN — Medication 3 MILLIGRAM(S): at 21:23

## 2020-01-05 RX ADMIN — Medication 5 MILLIGRAM(S): at 21:23

## 2020-01-05 RX ADMIN — ATORVASTATIN CALCIUM 40 MILLIGRAM(S): 80 TABLET, FILM COATED ORAL at 21:23

## 2020-01-05 RX ADMIN — PANTOPRAZOLE SODIUM 40 MILLIGRAM(S): 20 TABLET, DELAYED RELEASE ORAL at 10:13

## 2020-01-05 RX ADMIN — MONTELUKAST 10 MILLIGRAM(S): 4 TABLET, CHEWABLE ORAL at 09:31

## 2020-01-05 RX ADMIN — Medication 20 MILLIGRAM(S): at 09:30

## 2020-01-05 RX ADMIN — AMLODIPINE BESYLATE 2.5 MILLIGRAM(S): 2.5 TABLET ORAL at 09:28

## 2020-01-05 RX ADMIN — Medication 40 MILLIGRAM(S): at 06:43

## 2020-01-05 RX ADMIN — PREGABALIN 1000 MICROGRAM(S): 225 CAPSULE ORAL at 09:29

## 2020-01-05 RX ADMIN — Medication 5 MILLIGRAM(S): at 09:28

## 2020-01-05 RX ADMIN — CLOPIDOGREL BISULFATE 75 MILLIGRAM(S): 75 TABLET, FILM COATED ORAL at 09:29

## 2020-01-05 RX ADMIN — TIOTROPIUM BROMIDE 1 CAPSULE(S): 18 CAPSULE ORAL; RESPIRATORY (INHALATION) at 09:13

## 2020-01-05 RX ADMIN — HALOPERIDOL DECANOATE 0.5 MILLIGRAM(S): 100 INJECTION INTRAMUSCULAR at 11:25

## 2020-01-05 RX ADMIN — Medication 2000 UNIT(S): at 09:28

## 2020-01-05 RX ADMIN — LIDOCAINE 1 PATCH: 4 CREAM TOPICAL at 21:23

## 2020-01-05 RX ADMIN — Medication 5 MILLIGRAM(S): at 14:13

## 2020-01-05 RX ADMIN — OLANZAPINE 10 MILLIGRAM(S): 15 TABLET, FILM COATED ORAL at 21:23

## 2020-01-06 PROCEDURE — 99232 SBSQ HOSP IP/OBS MODERATE 35: CPT

## 2020-01-06 RX ORDER — HALOPERIDOL DECANOATE 100 MG/ML
1 INJECTION INTRAMUSCULAR EVERY 6 HOURS
Refills: 0 | Status: DISCONTINUED | OUTPATIENT
Start: 2020-01-06 | End: 2020-01-09

## 2020-01-06 RX ADMIN — LIDOCAINE 1 PATCH: 4 CREAM TOPICAL at 21:12

## 2020-01-06 RX ADMIN — Medication 1 MILLIGRAM(S): at 09:50

## 2020-01-06 RX ADMIN — Medication 5 MILLIGRAM(S): at 14:01

## 2020-01-06 RX ADMIN — MONTELUKAST 10 MILLIGRAM(S): 4 TABLET, CHEWABLE ORAL at 09:49

## 2020-01-06 RX ADMIN — LIDOCAINE 1 PATCH: 4 CREAM TOPICAL at 19:26

## 2020-01-06 RX ADMIN — OLANZAPINE 10 MILLIGRAM(S): 15 TABLET, FILM COATED ORAL at 21:12

## 2020-01-06 RX ADMIN — Medication 125 MICROGRAM(S): at 09:50

## 2020-01-06 RX ADMIN — Medication 5 MILLIGRAM(S): at 21:12

## 2020-01-06 RX ADMIN — PREGABALIN 1000 MICROGRAM(S): 225 CAPSULE ORAL at 09:50

## 2020-01-06 RX ADMIN — NYSTATIN CREAM 1 APPLICATION(S): 100000 CREAM TOPICAL at 21:12

## 2020-01-06 RX ADMIN — Medication 5 MILLIGRAM(S): at 09:50

## 2020-01-06 RX ADMIN — CLOPIDOGREL BISULFATE 75 MILLIGRAM(S): 75 TABLET, FILM COATED ORAL at 09:50

## 2020-01-06 RX ADMIN — Medication 3 MILLIGRAM(S): at 21:12

## 2020-01-06 RX ADMIN — OLANZAPINE 10 MILLIGRAM(S): 15 TABLET, FILM COATED ORAL at 09:49

## 2020-01-06 RX ADMIN — HALOPERIDOL DECANOATE 0.5 MILLIGRAM(S): 100 INJECTION INTRAMUSCULAR at 10:40

## 2020-01-06 RX ADMIN — AMLODIPINE BESYLATE 2.5 MILLIGRAM(S): 2.5 TABLET ORAL at 09:50

## 2020-01-06 RX ADMIN — LOSARTAN POTASSIUM 100 MILLIGRAM(S): 100 TABLET, FILM COATED ORAL at 09:51

## 2020-01-06 RX ADMIN — TIOTROPIUM BROMIDE 1 CAPSULE(S): 18 CAPSULE ORAL; RESPIRATORY (INHALATION) at 10:12

## 2020-01-06 RX ADMIN — HALOPERIDOL DECANOATE 1 MILLIGRAM(S): 100 INJECTION INTRAMUSCULAR at 15:55

## 2020-01-06 RX ADMIN — ATORVASTATIN CALCIUM 40 MILLIGRAM(S): 80 TABLET, FILM COATED ORAL at 21:12

## 2020-01-06 RX ADMIN — Medication 2000 UNIT(S): at 09:50

## 2020-01-06 RX ADMIN — Medication 20 MILLIGRAM(S): at 09:50

## 2020-01-06 RX ADMIN — Medication 40 MILLIGRAM(S): at 06:07

## 2020-01-06 RX ADMIN — NYSTATIN CREAM 1 APPLICATION(S): 100000 CREAM TOPICAL at 09:49

## 2020-01-06 RX ADMIN — Medication 81 MILLIGRAM(S): at 09:50

## 2020-01-06 RX ADMIN — LIDOCAINE 1 PATCH: 4 CREAM TOPICAL at 09:50

## 2020-01-07 PROCEDURE — 90853 GROUP PSYCHOTHERAPY: CPT

## 2020-01-07 PROCEDURE — 99232 SBSQ HOSP IP/OBS MODERATE 35: CPT | Mod: GC

## 2020-01-07 PROCEDURE — 99233 SBSQ HOSP IP/OBS HIGH 50: CPT

## 2020-01-07 RX ORDER — LANOLIN ALCOHOL/MO/W.PET/CERES
5 CREAM (GRAM) TOPICAL ONCE
Refills: 0 | Status: COMPLETED | OUTPATIENT
Start: 2020-01-07 | End: 2020-01-07

## 2020-01-07 RX ORDER — LANOLIN ALCOHOL/MO/W.PET/CERES
5 CREAM (GRAM) TOPICAL AT BEDTIME
Refills: 0 | Status: DISCONTINUED | OUTPATIENT
Start: 2020-01-07 | End: 2020-01-07

## 2020-01-07 RX ADMIN — CLOPIDOGREL BISULFATE 75 MILLIGRAM(S): 75 TABLET, FILM COATED ORAL at 09:55

## 2020-01-07 RX ADMIN — LOSARTAN POTASSIUM 100 MILLIGRAM(S): 100 TABLET, FILM COATED ORAL at 09:21

## 2020-01-07 RX ADMIN — LIDOCAINE 1 PATCH: 4 CREAM TOPICAL at 10:20

## 2020-01-07 RX ADMIN — MONTELUKAST 10 MILLIGRAM(S): 4 TABLET, CHEWABLE ORAL at 09:21

## 2020-01-07 RX ADMIN — Medication 650 MILLIGRAM(S): at 15:40

## 2020-01-07 RX ADMIN — Medication 3 MILLIGRAM(S): at 21:09

## 2020-01-07 RX ADMIN — Medication 1 MILLIGRAM(S): at 09:20

## 2020-01-07 RX ADMIN — LIDOCAINE 1 PATCH: 4 CREAM TOPICAL at 20:55

## 2020-01-07 RX ADMIN — Medication 81 MILLIGRAM(S): at 09:55

## 2020-01-07 RX ADMIN — AMLODIPINE BESYLATE 2.5 MILLIGRAM(S): 2.5 TABLET ORAL at 09:55

## 2020-01-07 RX ADMIN — Medication 5 MILLIGRAM(S): at 12:21

## 2020-01-07 RX ADMIN — ATORVASTATIN CALCIUM 40 MILLIGRAM(S): 80 TABLET, FILM COATED ORAL at 21:08

## 2020-01-07 RX ADMIN — LIDOCAINE 1 PATCH: 4 CREAM TOPICAL at 21:53

## 2020-01-07 RX ADMIN — Medication 2000 UNIT(S): at 09:55

## 2020-01-07 RX ADMIN — NYSTATIN CREAM 1 APPLICATION(S): 100000 CREAM TOPICAL at 09:21

## 2020-01-07 RX ADMIN — TIOTROPIUM BROMIDE 1 CAPSULE(S): 18 CAPSULE ORAL; RESPIRATORY (INHALATION) at 10:21

## 2020-01-07 RX ADMIN — HALOPERIDOL DECANOATE 1 MILLIGRAM(S): 100 INJECTION INTRAMUSCULAR at 23:55

## 2020-01-07 RX ADMIN — Medication 5 MILLIGRAM(S): at 09:55

## 2020-01-07 RX ADMIN — OLANZAPINE 10 MILLIGRAM(S): 15 TABLET, FILM COATED ORAL at 21:09

## 2020-01-07 RX ADMIN — PANTOPRAZOLE SODIUM 40 MILLIGRAM(S): 20 TABLET, DELAYED RELEASE ORAL at 09:21

## 2020-01-07 RX ADMIN — HALOPERIDOL DECANOATE 1 MILLIGRAM(S): 100 INJECTION INTRAMUSCULAR at 16:20

## 2020-01-07 RX ADMIN — Medication 5 MILLIGRAM(S): at 21:08

## 2020-01-07 RX ADMIN — Medication 3 MILLILITER(S): at 21:53

## 2020-01-07 RX ADMIN — Medication 3 MILLILITER(S): at 13:52

## 2020-01-07 RX ADMIN — Medication 40 MILLIGRAM(S): at 06:43

## 2020-01-07 RX ADMIN — Medication 650 MILLIGRAM(S): at 14:49

## 2020-01-07 RX ADMIN — OLANZAPINE 10 MILLIGRAM(S): 15 TABLET, FILM COATED ORAL at 09:21

## 2020-01-07 RX ADMIN — NYSTATIN CREAM 1 APPLICATION(S): 100000 CREAM TOPICAL at 21:09

## 2020-01-07 RX ADMIN — Medication 5 MILLIGRAM(S): at 00:31

## 2020-01-07 RX ADMIN — Medication 125 MICROGRAM(S): at 09:21

## 2020-01-07 RX ADMIN — Medication 1 SPRAY(S): at 09:20

## 2020-01-07 RX ADMIN — PREGABALIN 1000 MICROGRAM(S): 225 CAPSULE ORAL at 09:55

## 2020-01-07 RX ADMIN — Medication 20 MILLIGRAM(S): at 09:20

## 2020-01-07 NOTE — PROGRESS NOTE ADULT - ASSESSMENT
69F w/HTN, HLD, NOEMY/obesity, Asthma/COPD?, DVT (not on AC now), breast and kidney CA (ABDIRAHMAN now), hypothyroidism/graves disease?, chronic pain s/p laminectomy, carpal tunnel, s/p admission to [facility] for cellulitis and bacteruria. Patient's hospitalization c/b diarrhea, likely secondary to antibiotics. The patient was subsequently transferred to [facility] b/o paranoid delusions    #ECT clearance  Depression with need for ECT, pre-procedure evaluation:   ECT specific risk assessment: pt without history of increased ICP, aneurysm, active pulmonary disease, valvular disease, CAD, cerebral vascular disease.   Cardiovascular risk assessment: Patient evaluated using the revised cardiac risk index and is felt to be (low) cardiovascular risk for a low cardiovascular risk procedure based on these criteria. The risk has been discussed with the patient and the patient wishes to proceed understanding that ECT is a low risk procedure.   Risk for complication is low. Patient is optimized for ECT treatment without further intervention and can proceed with treatment.  Anesthesia specific concerns :  History of adverse reaction to anesthesia previously:     Known Spine issues: multilevel anterior discectomy and interbody fusions and a posterior cervical laminectomy    #Asthma/COPD: pt reported chest tightness overnight, improved with duonebs, today exam with wheezing.  -Restart Spiriva daily (was on last hospital admission)  -C/w Duoneb q6   -Start Prednisone 40mg daily--per pharm med rec pt has been picking up prednisone 5-10mg for the past year, might be on chronic steroids for resp issues  -Attempted to reach out to pts pulmonologist, Dr. Larry ([phone]), however out of office until Monday. Will need to call to clarify steroid dosing and duration. Likely can start tapering next week, back to low dose.   -Pt reports being on Trelegy at home, however,  cannot bring in.     #LE edema: likely chronic, ?lymphedema. Improved on lasix   -C/w Lasix 20mg daily   -Compression stockings/elevate legs     #Hypothyroidism  -C/w synthroid 125 mcg  -Last TSH normal, periodic outpatient follow up recommended    #HTN  -Continue current meds (amlodipine and losartan)  -Blood pressure will be monitored and if needed medications will be adjusted accordingly    #Chronic pain  -Patient was prescribed opioids by outpatient doctors  -Continue current oxycodone as per [facility] records. Please attempt to touch base with outpatient doctor to determine indications

## 2020-01-07 NOTE — BEHAVIORAL HEALTH ASSESSMENT NOTE - NSBHCHARTREVIEWINVESTIGATE_PSY_A_CORE FT
< from: 12 Lead ECG (09.12.19 @ 00:34) >      Ventricular Rate 42 BPM    Atrial Rate 42 BPM    P-R Interval 160 ms    QRS Duration 114 ms    Q-T Interval 468 ms    QTC Calculation(Bezet) 390 ms    P Axis 51 degrees    R Axis 14 degrees    T Axis 0 degrees    Diagnosis Line Marked sinus bradycardia  Possible Anterior infarct , age undetermined  Abnormal ECG    < end of copied text > MEDICINE

## 2020-01-07 NOTE — PROGRESS NOTE ADULT - SUBJECTIVE AND OBJECTIVE BOX
[facility] Division of Hospital Medicine  Vicky Fernandez MD  Pager (NYDIA-F, 8A-5P): [phone]  Other Times:  [phone]    Patient is a 69y old  Female who presents with a chief complaint of psych d/o (27 Dec 2019 11:08)    SUBJECTIVE / OVERNIGHT EVENTS:  pt without complaints.  denies sob or chest pain this am.  seen eating breakfast without difficulty.      MEDICATIONS  (STANDING):  amLODIPine   Tablet 2.5 milliGRAM(s) Oral daily  aspirin  chewable 81 milliGRAM(s) Oral daily  atorvastatin 40 milliGRAM(s) Oral at bedtime  baclofen 5 milliGRAM(s) Oral three times a day  cholecalciferol 2000 Unit(s) Oral daily  clopidogrel Tablet 75 milliGRAM(s) Oral daily  cyanocobalamin 1000 MICROGram(s) Oral daily  enoxaparin Injectable 40 milliGRAM(s) SubCutaneous daily  fluticasone propionate 50 MICROgram(s)/spray Nasal Spray 1 Spray(s) Both Nostrils two times a day  folic acid 1 milliGRAM(s) Oral daily  furosemide    Tablet 20 milliGRAM(s) Oral daily  levothyroxine 125 MICROGram(s) Oral daily  lidocaine   Patch 1 Patch Transdermal daily  losartan 100 milliGRAM(s) Oral daily  melatonin. 3 milliGRAM(s) Oral at bedtime  montelukast 10 milliGRAM(s) Oral daily  nystatin Powder 1 Application(s) Topical two times a day  OLANZapine 10 milliGRAM(s) Oral two times a day  pantoprazole    Tablet 40 milliGRAM(s) Oral before breakfast  predniSONE   Tablet 40 milliGRAM(s) Oral daily  tiotropium 18 MICROgram(s) Capsule 1 Capsule(s) Inhalation daily    MEDICATIONS  (PRN):  acetaminophen   Tablet .. 650 milliGRAM(s) Oral every 6 hours PRN Temp greater or equal to 38C (100.4F), Moderate Pain (4 - 6), Severe Pain (7 - 10)  albuterol/ipratropium for Nebulization. 3 milliLiter(s) Nebulizer every 6 hours PRN asthma  haloperidol     Tablet 1 milliGRAM(s) Oral every 6 hours PRN psychosis  haloperidol    Injectable 0.5 milliGRAM(s) IntraMuscular once PRN combative behavior  loperamide 2 milliGRAM(s) Oral every 2 hours PRN diarrhea      CAPILLARY BLOOD GLUCOSE    I&O's Summary      PHYSICAL EXAM:  Vital Signs Last 24 Hrs  T(C): 36.8 (07 Jan 2020 06:22), Max: 37.2 (06 Jan 2020 15:49)  T(F): 98.2 (07 Jan 2020 06:22), Max: 99 (06 Jan 2020 15:49)  HR: --  BP: --  BP(mean): --  RR: --  SpO2: --    CONSTITUTIONAL: NAD, well-developed, well-groomed  EYES: EOMI; conjunctiva and sclera clear  ENMT: Moist oral mucosa  NECK: Supple  RESPIRATORY: Normal respiratory effort; lungs are clear to auscultation bilaterally; no wheezing   CARDIOVASCULAR: Regular rate and rhythm, normal S1 and S2, no murmur; No lower extremity edema; Peripheral pulses are 2+ bilaterally  ABDOMEN: Nontender to palpation, normoactive bowel sounds, no rebound/guarding  MUSCULOSKELETAL:   no clubbing or cyanosis of digits; no joint swelling or tenderness to palpation  PSYCH: A+O to person, place, and time; affect appropriate  NEUROLOGY: CN 2-12 are intact and symmetric; no gross sensory deficits   SKIN: No rashes; no palpable lesions    LABS:        RADIOLOGY & ADDITIONAL TESTS:  Results Reviewed:   Imaging Personally Reviewed:  Electrocardiogram Personally Reviewed: reviewed; 80 bpm    COORDINATION OF CARE:  Care Discussed with Consultants/Other Providers [Y/N]: Y  Prior or Outpatient Records Reviewed [Y/N]: Y

## 2020-01-08 PROCEDURE — 99232 SBSQ HOSP IP/OBS MODERATE 35: CPT

## 2020-01-08 RX ADMIN — Medication 125 MICROGRAM(S): at 10:27

## 2020-01-08 RX ADMIN — ATORVASTATIN CALCIUM 40 MILLIGRAM(S): 80 TABLET, FILM COATED ORAL at 21:15

## 2020-01-08 RX ADMIN — Medication 81 MILLIGRAM(S): at 10:27

## 2020-01-08 RX ADMIN — Medication 5 MILLIGRAM(S): at 21:15

## 2020-01-08 RX ADMIN — Medication 5 MILLIGRAM(S): at 10:27

## 2020-01-08 RX ADMIN — AMLODIPINE BESYLATE 2.5 MILLIGRAM(S): 2.5 TABLET ORAL at 10:27

## 2020-01-08 RX ADMIN — NYSTATIN CREAM 1 APPLICATION(S): 100000 CREAM TOPICAL at 21:15

## 2020-01-08 RX ADMIN — Medication 3 MILLIGRAM(S): at 21:15

## 2020-01-08 RX ADMIN — CLOPIDOGREL BISULFATE 75 MILLIGRAM(S): 75 TABLET, FILM COATED ORAL at 10:27

## 2020-01-08 RX ADMIN — Medication 2000 UNIT(S): at 10:27

## 2020-01-08 RX ADMIN — Medication 1 MILLIGRAM(S): at 10:27

## 2020-01-08 RX ADMIN — LIDOCAINE 1 PATCH: 4 CREAM TOPICAL at 22:01

## 2020-01-08 RX ADMIN — Medication 20 MILLIGRAM(S): at 10:27

## 2020-01-08 RX ADMIN — Medication 650 MILLIGRAM(S): at 17:05

## 2020-01-08 RX ADMIN — Medication 30 MILLIGRAM(S): at 06:44

## 2020-01-08 RX ADMIN — LIDOCAINE 1 PATCH: 4 CREAM TOPICAL at 19:18

## 2020-01-08 RX ADMIN — OLANZAPINE 10 MILLIGRAM(S): 15 TABLET, FILM COATED ORAL at 21:15

## 2020-01-08 RX ADMIN — MONTELUKAST 10 MILLIGRAM(S): 4 TABLET, CHEWABLE ORAL at 10:27

## 2020-01-08 RX ADMIN — OLANZAPINE 10 MILLIGRAM(S): 15 TABLET, FILM COATED ORAL at 10:28

## 2020-01-08 RX ADMIN — TIOTROPIUM BROMIDE 1 CAPSULE(S): 18 CAPSULE ORAL; RESPIRATORY (INHALATION) at 10:28

## 2020-01-08 RX ADMIN — Medication 5 MILLIGRAM(S): at 14:31

## 2020-01-08 RX ADMIN — LIDOCAINE 1 PATCH: 4 CREAM TOPICAL at 10:27

## 2020-01-08 RX ADMIN — Medication 650 MILLIGRAM(S): at 18:00

## 2020-01-08 RX ADMIN — Medication 3 MILLILITER(S): at 14:10

## 2020-01-08 RX ADMIN — PREGABALIN 1000 MICROGRAM(S): 225 CAPSULE ORAL at 10:27

## 2020-01-08 RX ADMIN — LOSARTAN POTASSIUM 100 MILLIGRAM(S): 100 TABLET, FILM COATED ORAL at 10:27

## 2020-01-09 LAB
ANION GAP SERPL CALC-SCNC: 11 MMO/L — SIGNIFICANT CHANGE UP (ref 7–14)
BASOPHILS # BLD AUTO: 0.1 K/UL — SIGNIFICANT CHANGE UP (ref 0–0.2)
BASOPHILS NFR BLD AUTO: 0.5 % — SIGNIFICANT CHANGE UP (ref 0–2)
BUN SERPL-MCNC: 17 MG/DL — SIGNIFICANT CHANGE UP (ref 7–23)
CALCIUM SERPL-MCNC: 9.8 MG/DL — SIGNIFICANT CHANGE UP (ref 8.4–10.5)
CHLORIDE SERPL-SCNC: 102 MMOL/L — SIGNIFICANT CHANGE UP (ref 98–107)
CO2 SERPL-SCNC: 30 MMOL/L — SIGNIFICANT CHANGE UP (ref 22–31)
CREAT SERPL-MCNC: 0.58 MG/DL — SIGNIFICANT CHANGE UP (ref 0.5–1.3)
EOSINOPHIL # BLD AUTO: 0.06 K/UL — SIGNIFICANT CHANGE UP (ref 0–0.5)
EOSINOPHIL NFR BLD AUTO: 0.3 % — SIGNIFICANT CHANGE UP (ref 0–6)
GLUCOSE SERPL-MCNC: 107 MG/DL — HIGH (ref 70–99)
HCT VFR BLD CALC: 40.5 % — SIGNIFICANT CHANGE UP (ref 34.5–45)
HGB BLD-MCNC: 12.1 G/DL — SIGNIFICANT CHANGE UP (ref 11.5–15.5)
IMM GRANULOCYTES NFR BLD AUTO: 1 % — SIGNIFICANT CHANGE UP (ref 0–1.5)
LYMPHOCYTES # BLD AUTO: 13.4 % — SIGNIFICANT CHANGE UP (ref 13–44)
LYMPHOCYTES # BLD AUTO: 2.65 K/UL — SIGNIFICANT CHANGE UP (ref 1–3.3)
MCHC RBC-ENTMCNC: 28 PG — SIGNIFICANT CHANGE UP (ref 27–34)
MCHC RBC-ENTMCNC: 29.9 % — LOW (ref 32–36)
MCV RBC AUTO: 93.8 FL — SIGNIFICANT CHANGE UP (ref 80–100)
MONOCYTES # BLD AUTO: 0.99 K/UL — HIGH (ref 0–0.9)
MONOCYTES NFR BLD AUTO: 5 % — SIGNIFICANT CHANGE UP (ref 2–14)
NEUTROPHILS # BLD AUTO: 15.85 K/UL — HIGH (ref 1.8–7.4)
NEUTROPHILS NFR BLD AUTO: 79.8 % — HIGH (ref 43–77)
NRBC # FLD: 0 K/UL — SIGNIFICANT CHANGE UP (ref 0–0)
PLATELET # BLD AUTO: 320 K/UL — SIGNIFICANT CHANGE UP (ref 150–400)
PMV BLD: 10.9 FL — SIGNIFICANT CHANGE UP (ref 7–13)
POTASSIUM SERPL-MCNC: 4 MMOL/L — SIGNIFICANT CHANGE UP (ref 3.5–5.3)
POTASSIUM SERPL-SCNC: 4 MMOL/L — SIGNIFICANT CHANGE UP (ref 3.5–5.3)
RBC # BLD: 4.32 M/UL — SIGNIFICANT CHANGE UP (ref 3.8–5.2)
RBC # FLD: 16 % — HIGH (ref 10.3–14.5)
SODIUM SERPL-SCNC: 143 MMOL/L — SIGNIFICANT CHANGE UP (ref 135–145)
WBC # BLD: 19.85 K/UL — HIGH (ref 3.8–10.5)
WBC # FLD AUTO: 19.85 K/UL — HIGH (ref 3.8–10.5)

## 2020-01-09 PROCEDURE — 99233 SBSQ HOSP IP/OBS HIGH 50: CPT

## 2020-01-09 PROCEDURE — 90853 GROUP PSYCHOTHERAPY: CPT

## 2020-01-09 PROCEDURE — 99232 SBSQ HOSP IP/OBS MODERATE 35: CPT

## 2020-01-09 RX ADMIN — Medication 1 MILLIGRAM(S): at 16:20

## 2020-01-09 RX ADMIN — CLOPIDOGREL BISULFATE 75 MILLIGRAM(S): 75 TABLET, FILM COATED ORAL at 10:27

## 2020-01-09 RX ADMIN — Medication 30 MILLIGRAM(S): at 06:55

## 2020-01-09 RX ADMIN — Medication 81 MILLIGRAM(S): at 10:27

## 2020-01-09 RX ADMIN — Medication 1 SPRAY(S): at 10:27

## 2020-01-09 RX ADMIN — LOSARTAN POTASSIUM 100 MILLIGRAM(S): 100 TABLET, FILM COATED ORAL at 10:26

## 2020-01-09 RX ADMIN — Medication 125 MICROGRAM(S): at 10:27

## 2020-01-09 RX ADMIN — Medication 5 MILLIGRAM(S): at 13:36

## 2020-01-09 RX ADMIN — OLANZAPINE 10 MILLIGRAM(S): 15 TABLET, FILM COATED ORAL at 20:38

## 2020-01-09 RX ADMIN — NYSTATIN CREAM 1 APPLICATION(S): 100000 CREAM TOPICAL at 10:26

## 2020-01-09 RX ADMIN — MONTELUKAST 10 MILLIGRAM(S): 4 TABLET, CHEWABLE ORAL at 10:26

## 2020-01-09 RX ADMIN — NYSTATIN CREAM 1 APPLICATION(S): 100000 CREAM TOPICAL at 20:38

## 2020-01-09 RX ADMIN — AMLODIPINE BESYLATE 2.5 MILLIGRAM(S): 2.5 TABLET ORAL at 10:27

## 2020-01-09 RX ADMIN — Medication 2000 UNIT(S): at 10:27

## 2020-01-09 RX ADMIN — ATORVASTATIN CALCIUM 40 MILLIGRAM(S): 80 TABLET, FILM COATED ORAL at 20:38

## 2020-01-09 RX ADMIN — Medication 3 MILLIGRAM(S): at 20:38

## 2020-01-09 RX ADMIN — OLANZAPINE 10 MILLIGRAM(S): 15 TABLET, FILM COATED ORAL at 10:26

## 2020-01-09 RX ADMIN — TIOTROPIUM BROMIDE 1 CAPSULE(S): 18 CAPSULE ORAL; RESPIRATORY (INHALATION) at 10:26

## 2020-01-09 RX ADMIN — PANTOPRAZOLE SODIUM 40 MILLIGRAM(S): 20 TABLET, DELAYED RELEASE ORAL at 10:26

## 2020-01-09 RX ADMIN — Medication 5 MILLIGRAM(S): at 10:27

## 2020-01-09 RX ADMIN — Medication 20 MILLIGRAM(S): at 10:27

## 2020-01-09 RX ADMIN — Medication 5 MILLIGRAM(S): at 20:38

## 2020-01-09 RX ADMIN — PREGABALIN 1000 MICROGRAM(S): 225 CAPSULE ORAL at 10:27

## 2020-01-09 RX ADMIN — Medication 1 MILLIGRAM(S): at 11:29

## 2020-01-09 RX ADMIN — Medication 1 MILLIGRAM(S): at 10:27

## 2020-01-09 NOTE — PROGRESS NOTE ADULT - SUBJECTIVE AND OBJECTIVE BOX
[facility] Division of Hospital Medicine  Vicky Fernandez MD  Pager (NYDIA-F, 8A-5P): [phone]  Other Times:  [phone]    Patient is a 69y old  Female who presents with a chief complaint of psych d/o (27 Dec 2019 11:08)    SUBJECTIVE / OVERNIGHT EVENTS:  denies complaints.  no sob or chest pain.     MEDICATIONS  (STANDING):  amLODIPine   Tablet 2.5 milliGRAM(s) Oral daily  aspirin  chewable 81 milliGRAM(s) Oral daily  atorvastatin 40 milliGRAM(s) Oral at bedtime  baclofen 5 milliGRAM(s) Oral three times a day  cholecalciferol 2000 Unit(s) Oral daily  clopidogrel Tablet 75 milliGRAM(s) Oral daily  cyanocobalamin 1000 MICROGram(s) Oral daily  enoxaparin Injectable 40 milliGRAM(s) SubCutaneous daily  fluticasone propionate 50 MICROgram(s)/spray Nasal Spray 1 Spray(s) Both Nostrils two times a day  folic acid 1 milliGRAM(s) Oral daily  furosemide    Tablet 20 milliGRAM(s) Oral daily  levothyroxine 125 MICROGram(s) Oral daily  lidocaine   Patch 1 Patch Transdermal daily  losartan 100 milliGRAM(s) Oral daily  melatonin. 3 milliGRAM(s) Oral at bedtime  montelukast 10 milliGRAM(s) Oral daily  nystatin Powder 1 Application(s) Topical two times a day  OLANZapine 10 milliGRAM(s) Oral two times a day  pantoprazole    Tablet 40 milliGRAM(s) Oral before breakfast  predniSONE   Tablet   Oral   predniSONE   Tablet 30 milliGRAM(s) Oral daily  tiotropium 18 MICROgram(s) Capsule 1 Capsule(s) Inhalation daily    MEDICATIONS  (PRN):  acetaminophen   Tablet .. 650 milliGRAM(s) Oral every 6 hours PRN Temp greater or equal to 38C (100.4F), Moderate Pain (4 - 6), Severe Pain (7 - 10)  albuterol/ipratropium for Nebulization. 3 milliLiter(s) Nebulizer every 6 hours PRN asthma  haloperidol     Tablet 1 milliGRAM(s) Oral every 6 hours PRN psychosis  haloperidol    Injectable 0.5 milliGRAM(s) IntraMuscular once PRN combative behavior  loperamide 2 milliGRAM(s) Oral every 2 hours PRN diarrhea      CAPILLARY BLOOD GLUCOSE        I&O's Summary      PHYSICAL EXAM:  Vital Signs Last 24 Hrs  T(C): 36.1 (09 Jan 2020 06:26), Max: 37.1 (08 Jan 2020 15:48)  T(F): 97 (09 Jan 2020 06:26), Max: 98.7 (08 Jan 2020 15:48)  HR: --  BP: --  BP(mean): --  RR: --  SpO2: --    CONSTITUTIONAL: NAD, well-developed, well-groomed  EYES: EOMI; conjunctiva and sclera clear  ENMT: Moist oral mucosa  NECK: Supple  RESPIRATORY: Normal respiratory effort; lungs are clear to auscultation bilaterally; no wheezing   CARDIOVASCULAR: Regular rate and rhythm, normal S1 and S2, no murmur; No lower extremity edema; Peripheral pulses are 2+ bilaterally  ABDOMEN: Nontender to palpation, normoactive bowel sounds, no rebound/guarding  MUSCULOSKELETAL:   no clubbing or cyanosis of digits; no joint swelling or tenderness to palpation  PSYCH: A+O to person, place, and time; affect appropriate  NEUROLOGY: CN 2-12 are intact and symmetric; no gross sensory deficits   SKIN: No rashes; no palpable lesions    LABS:                        12.1   19.85 )-----------( 320      ( 09 Jan 2020 08:30 )             40.5     01-09    143  |  102  |  17  ----------------------------<  107<H>  4.0   |  30  |  0.58    Ca    9.8      09 Jan 2020 08:30        RADIOLOGY & ADDITIONAL TESTS:  Results Reviewed:   Imaging Personally Reviewed:  Electrocardiogram Personally Reviewed:    COORDINATION OF CARE:  Care Discussed with Consultants/Other Providers [Y/N]: Y  Prior or Outpatient Records Reviewed [Y/N]: Y

## 2020-01-09 NOTE — PROGRESS NOTE ADULT - ASSESSMENT
69F w/HTN, HLD, NOEMY/obesity, Asthma/COPD?, DVT (not on AC now), breast and kidney CA (ABDIRAHMAN now), hypothyroidism/graves disease?, chronic pain s/p laminectomy, carpal tunnel, s/p admission to [facility] for cellulitis and bacteruria. Patient's hospitalization c/b diarrhea, likely secondary to antibiotics. The patient was subsequently transferred to [facility] b/o paranoid delusions    #ECT clearance  Depression with need for ECT, pre-procedure evaluation:   ECT specific risk assessment: pt without history of increased ICP, aneurysm, active pulmonary disease, valvular disease, CAD, cerebral vascular disease.   Cardiovascular risk assessment: Patient evaluated using the revised cardiac risk index and is felt to be (low) cardiovascular risk for a low cardiovascular risk procedure based on these criteria. The risk has been discussed with the patient and the patient wishes to proceed understanding that ECT is a low risk procedure.   Risk for complication is low. Patient is optimized for ECT treatment without further intervention and can proceed with treatment.  Anesthesia specific concerns :  History of adverse reaction to anesthesia previously:     Known Spine issues: multilevel anterior discectomy and interbody fusions and a posterior cervical laminectomy    #Asthma/COPD: pt reported chest tightness overnight, improved with duonebs, today exam with wheezing.  -Restart Spiriva daily (was on last hospital admission)  -C/w Duoneb q6   -Start Prednisone 40mg daily--per pharm med rec pt has been picking up prednisone 5-10mg for the past year, might be on chronic steroids for resp issues  -discussed with Dr. Larry ([phone]), pt chronically on pred 10 mg daily; will taper steroids down to chronic dose.   -Pt reports being on Trelegy at home, however,  cannot bring in.   - leukocytosis on CBC likely due to steroids - no concern for infectious etiology at this time     #LE edema: likely chronic, ?lymphedema. Improved on lasix   -C/w Lasix 20mg daily   -Compression stockings/elevate legs     #Hypothyroidism  -C/w synthroid 125 mcg  -Last TSH normal, periodic outpatient follow up recommended    #HTN  -Continue current meds (amlodipine and losartan)  -Blood pressure will be monitored and if needed medications will be adjusted accordingly    #Chronic pain  -Patient was prescribed opioids by outpatient doctors  -Continue current oxycodone as per [facility] records. Please attempt to touch base with outpatient doctor to determine indications

## 2020-01-10 PROCEDURE — 99232 SBSQ HOSP IP/OBS MODERATE 35: CPT

## 2020-01-10 RX ADMIN — Medication 1 MILLIGRAM(S): at 08:08

## 2020-01-10 RX ADMIN — Medication 20 MILLIGRAM(S): at 08:08

## 2020-01-10 RX ADMIN — Medication 5 MILLIGRAM(S): at 08:07

## 2020-01-10 RX ADMIN — OLANZAPINE 10 MILLIGRAM(S): 15 TABLET, FILM COATED ORAL at 22:17

## 2020-01-10 RX ADMIN — CLOPIDOGREL BISULFATE 75 MILLIGRAM(S): 75 TABLET, FILM COATED ORAL at 08:08

## 2020-01-10 RX ADMIN — PANTOPRAZOLE SODIUM 40 MILLIGRAM(S): 20 TABLET, DELAYED RELEASE ORAL at 08:08

## 2020-01-10 RX ADMIN — NYSTATIN CREAM 1 APPLICATION(S): 100000 CREAM TOPICAL at 22:17

## 2020-01-10 RX ADMIN — OLANZAPINE 10 MILLIGRAM(S): 15 TABLET, FILM COATED ORAL at 08:08

## 2020-01-10 RX ADMIN — Medication 5 MILLIGRAM(S): at 13:20

## 2020-01-10 RX ADMIN — PREGABALIN 1000 MICROGRAM(S): 225 CAPSULE ORAL at 08:08

## 2020-01-10 RX ADMIN — AMLODIPINE BESYLATE 2.5 MILLIGRAM(S): 2.5 TABLET ORAL at 08:07

## 2020-01-10 RX ADMIN — Medication 3 MILLILITER(S): at 13:28

## 2020-01-10 RX ADMIN — Medication 1 MILLIGRAM(S): at 17:57

## 2020-01-10 RX ADMIN — NYSTATIN CREAM 1 APPLICATION(S): 100000 CREAM TOPICAL at 08:08

## 2020-01-10 RX ADMIN — MONTELUKAST 10 MILLIGRAM(S): 4 TABLET, CHEWABLE ORAL at 08:08

## 2020-01-10 RX ADMIN — Medication 125 MICROGRAM(S): at 08:08

## 2020-01-10 RX ADMIN — Medication 30 MILLIGRAM(S): at 08:08

## 2020-01-10 RX ADMIN — LOSARTAN POTASSIUM 100 MILLIGRAM(S): 100 TABLET, FILM COATED ORAL at 08:08

## 2020-01-10 RX ADMIN — Medication 650 MILLIGRAM(S): at 15:14

## 2020-01-10 RX ADMIN — Medication 1 MILLIGRAM(S): at 09:42

## 2020-01-10 RX ADMIN — Medication 1 SPRAY(S): at 22:17

## 2020-01-10 RX ADMIN — Medication 5 MILLIGRAM(S): at 22:16

## 2020-01-10 RX ADMIN — Medication 2000 UNIT(S): at 08:08

## 2020-01-10 RX ADMIN — ATORVASTATIN CALCIUM 40 MILLIGRAM(S): 80 TABLET, FILM COATED ORAL at 22:15

## 2020-01-10 RX ADMIN — TIOTROPIUM BROMIDE 1 CAPSULE(S): 18 CAPSULE ORAL; RESPIRATORY (INHALATION) at 09:31

## 2020-01-10 RX ADMIN — Medication 81 MILLIGRAM(S): at 08:07

## 2020-01-10 RX ADMIN — Medication 3 MILLIGRAM(S): at 22:17

## 2020-01-10 NOTE — CHART NOTE - NSCHARTNOTEFT_GEN_A_CORE
Referring Physician: Frances Day DO   Source of information: The patient, the patient’s chart, and Dr. Day  Reason for Consult: ECT clearance for patient with h/o multiple cervical spine surgeries     History of Present Illness:   The patient is a 69 year-old woman with PPH of major depressive disorder, anxiety and delusional disorder, with multiple prior inpatient psychiatric hospitalizations, complex past medical history, and past surgical history significant for cervical spine laminectomy in January 2015, and repeat cervical spine laminectomy with fusion in January 2016 presenting with treatment-resistant depression now requiring ECT. Primary team is asking neurology for clearance as patient would require neck extension during ECT procedure anesthesia. The patient had a previous inpatient neurological consultation on 12/1/2015, prior to her second cervical surgery, at which time she was noted to be paraplegic, as well as having weakness of bilateral hands. On interview, patient says that since her second cervical laminectomy with spinal fusion in 2016, her symptoms have much improved. She regained full function of her hands and can also use her lower extremities, although she is severely deconditioned and has limited ambulation. She uses a walker only at home and when leaving the house uses a wheelchair. She complains of lingering allodynia in her lower extremities below the knees and also complains of persistent pain but denies upper extremity and/or neck pain. She endorses a long history of both urinary and fecal incontinence. The urinary incontinence occurs due to increased urgency and she says she occasionally feels a strong urge to urinate but instead has bowel movements she is unaware of. At other times, she can feel the urge to defecate and can make it to the bathroom in time. Patient notes that due to the tremor in her hands she has been previously tested by neurologist Dr. Dylan Pastrana, who determined she does not have Parkinson’s disease.     Allergies: No known drug allergies     Current Medications:   Standing:  olanzapine 10mg PO BID  melatonin 3mg PO qHS   aspirin 81mg chewable tablet PO daily  amlodipine 2.5mg PO daily  losartan 100mg PO daily  furosemide 20mg PO daily  atorvastatin 40mg PO qHS  baclofen 5mg PO TID  tiotropium 18mcg capsule 1 inhalation daily  montelukast 10mg PO daily  prednisone 30mg PO daily, to be tapered   levothyroxine 125mcg PO daily  Lovenox 40mg subq daily  Plavix 75mg PO daily  pantoprazole 40mg PO daily, before breakfast  fluticasone propionate 50mcg 1 spray both nostrils BID   cholecalciferol 2000 units PO daily  cyanocobalamin 1000mcg PO daily  folic acid 1mg PO daily  lidocaine patch 1 patch transdermal daily  nystatin powder 1 application BID    PRN:  Ativan 1mg PO q6 PRN anxiety  Haldol 0.5mg IM PRN combative behavior  Tylenol 650mg PO q6 PRN temperature >38C, moderate (4-6/10) or severe pain (7-10/10)  loperamide 2mg PO q2 PRN diarrhea   albuterol/ipratroprium 3mL nebulizer q6 PRN asthma exacerbation     Past Medical History:   HTN  HLD  COPD  asthma  DVT  Graves’ disease  Hypothyroidism   Kidney cancer s/p left-sided partial nephrectomy  Breast cancer in situ, left breast s/p breast biopsy    Past Surgical History:   Multiple eye surgeries for complications of Graves’ disease  parathyroidectomy  pelvic surgery for urinary incontinence, pelvic sling  cholecystectomy  cervical laminectomy (2015)  cervical laminectomy with fusion (2016)  Left-sided partial nephrectomy  Left breast biopsy    Past Psychiatric History:   Major depressive disorder, anxiety, delusional disorder   Patient has had multiple psychiatric hospitalizations, and has received ECT in the past in [facility] (October 2014). She does not currently follow with an outpatient psychiatrist.     Family History: Childhood absence seizures in son, depression in sister, thyroid cancer in daughter, osteoarthritis in brother s/p b/l knee replacements.      Social History: Patient is  and lives with her  and adult son (developmentally delayed) in a private residence, she is unemployed due to disability. She quit smoking approximately 15 years ago but smoked 1 PPD prior to that. She denies all other substance use.     ROS:   Constitutional: Endorses recent weight loss secondary to fear of choking on hospital food   HEENT: Denies headaches, changes in vision, or changes in hearing.  Respiratory: Endorses chest and back “tightness,” shortness of breath, non-productive cough, and orthopnea.  Cardiac: Denies palpitations.  Gastrointestinal: Endorses diarrhea, fecal incontinence, nausea. Denies abdominal pain, vomiting, and constipation.   Genitourinary: Endorses urinary incontinence. Denies dysuria, hematuria, and changes in urinary frequency.  Musculoskeletal: Endorses bilateral LE pain (knee down) and back pain, denies UE and neck pain.   Neuro: Endorses fingertip numbness of all fingers b/l, endorses weakness in all 4 limbs, denies paresthesias and dizziness.   Heme: Endorses easy bruising   Skin: No rashes or new lesions reported.     Physical Exam:  VS: T: 97.7 °F, BP: 137/90, RR: 16  Gen: Well-developed, well-nourished woman, alert and in no acute distress, wheelchair bound.   HEENT: Normocephalic, atraumatic, mild proptosis of the eyes.  Neck: Old posterior cervical surgical scar. Somewhat limited extension and rotation of neck to either side, but full flexion, and supple within her range of motion.  CV: Regular rate & rhythm. Normal S1/S2 present, no extra heart sounds, no murmurs, rubs, or gallops.  Pulmonary: Clear to auscultation bilaterally with good inspiratory effort.   Abdomen: Normoactive bowel sounds present. Soft, nontender, and nondistended.   Back: No spinal deviation noted.   Ext: Pitting edema of lower extremities bilaterally with chronic venous stasis changes of skin. No clubbing or cyanosis of digits. Radial pulses 2+ and DP pulses 1+ bilaterally. Capillary refill <2 seconds.     Neurological Exam:  MSE:  Patient was alert. Patient was calm, cooperative, and maintained good eye contact. Speech was spontaneous and fluent with normal rate and volume.     CN II – PERRL. Red reflex present in both eyes. Fundi poorly seen. Peripheral fields intact bilaterally by confrontation.  CN III, IV, VI – Ptosis right more so than left. Concomitant exotropia.  CN V – V1-V3 intact to LT.  CN VII – No facial asymmetry; able to smile, raise eyebrows, close eyes against resistance, puff cheeks against resistance symmetrically and bilaterally.  CN VIII – Hearing intact bilaterally to finger rub.  CN IX, X – Palate elevates symmetrically bilaterally; uvula is midline.  CN XI – Shoulder shrug and head turn intact bilaterally, symmetric with good power.  CN XII – Tongue midline, no deviation.     Motor: Normal bulk and tone. 5/5 power in all muscle groups in the bilateral upper extremities including distal and proximal groups. Manual motor testing of lower extremities somewhat limited by pain, although at least 4/5 bilateral hip flexors and tibialis anterior, with 5/5 strength elsewhere. No cogwheel rigidity noted. No bradykinesia. Intermittent slow resting tremor of the left more so than right upper extremities, as well as more rapid low amplitude postural/kinetic tremor of the hands.     Sensory:  Temperature, pinprick, light touch, proprioception, and vibration sense intact in upper and lower extremities bilaterally, though testing of lower extremities elicited pain.     Reflexes:   	B-radialis	Biceps	Triceps	Patellar	Ankle	Plantar  Right	2+	2+	2+	Not tested due to pain	Not tested due to pain	Flexor  Left	2+	2+	2+	Not tested due to pain	Not tested due to pain	Flexor    Coordination: Finger-nose-finger intact bilaterally with no dysmetria. There was no dysdiadochokinesia on rapid alternating movements. Finger tapping rhythmic and symmetric. Lower extremity coordination, including heel-to-shin not tested secondary to pain.     Labs:  Complete Blood Count + Automated Diff in (01/09/2020):     WBC Count: 19.85 K/uL    RBC Count: 4.32 M/uL    Hemoglobin: 12.1 g/dL    Hematocrit: 40.5 %    Mean Cell Volume: 93.8 fL    Mean Cell Hemoglobin: 28.0 pg    Mean Cell Hemoglobin Conc: 29.9 %    Red Cell Distrib Width: 16.0 %    Platelet Count - Automated: 320:     MPV: 10.9 fl    Auto Neutrophil #: 15.85 K/uL    Auto Lymphocyte #: 2.65 K/uL    Auto Monocyte #: 0.99 K/uL    Auto Eosinophil #: 0.06 K/uL    Auto Basophil #: 0.10 K/uL    Auto Neutrophil %: 79.8 %    Auto Lymphocyte %: 13.4 %    Auto Monocyte %: 5.0 %    Auto Eosinophil %: 0.3 %    Auto Basophil %: 0.5 %    Auto Immature Granulocyte %: 1.0: (Includes meta, myelo and promyelocytes) %    Basic Metabolic Panel (01/09/2020):    Sodium, Serum: 143 mmol/L    Potassium, Serum: 4.0 mmol/L    Chloride, Serum: 102 mmol/L    Carbon Dioxide, Serum: 30 mmol/L    Anion Gap, Serum: 11 mmo/L    Blood Urea Nitrogen, Serum: 17 mg/dL    Creatinine, Serum: 0.58 mg/dL    Glucose, Serum: 107 mg/dL    Calcium, Total Serum: 9.8 mg/dL    Imaging: (Images from outside radiology service, not available for review on [employer])    MRI Cervical Spine without Contrast (07/30/2019)   Results attached    MRI Cervical Spine without Contrast (10/22/2016)  Results attached    MRI Cervical Spine without Contrast (08/06/2015)  Results attached    Assessment:   The patient is a 69 year-old woman with PPH of major depressive disorder, anxiety and delusional disorder, complex past medical history, and past surgical history significant for cervical spine laminectomy in January 2015 and repeat cervical spine laminectomy with fusion in January 2016. Since her 2016 surgery and stabilization of cervical spinal cord, she has significantly improved functioning of both upper and lower extremities. She has nearly full range of motion of her neck, though neck extension and rotation are somewhat limited secondary to fusion surgery. Cervical spine MRI from after her 2016 surgery shows stable myelomalacia and cord atrophy, most notably at the C5-C6 level. As patient has already had cervical decompression and fusion surgery, there is little risk of causing further damage to her cervical spinal cord during an ECT procedure with neck extension for anesthesia.     Recommendations:  1.	No neurological contraindication to ECT.  2.	Although her cervical spine has already been surgically stabilized, wearing a hard cervical collar during ECT procedures could be considered as an extra safeguard.           Zora Peguero M.D.  [phone]      I performed a history and physical examination of the patient, and discussed the management with the resident. I reviewed the resident's note and agree with the documented findings and plan of care. Findings and recommendations discussed with Dr. Day and Dr. Wright.        Tim Castellanos M.D.  [phone]  [facility] License # [id number]  NPI # [id number]

## 2020-01-11 PROCEDURE — 99231 SBSQ HOSP IP/OBS SF/LOW 25: CPT

## 2020-01-11 RX ADMIN — Medication 5 MILLIGRAM(S): at 09:24

## 2020-01-11 RX ADMIN — LIDOCAINE 1 PATCH: 4 CREAM TOPICAL at 21:26

## 2020-01-11 RX ADMIN — Medication 1 MILLIGRAM(S): at 10:00

## 2020-01-11 RX ADMIN — Medication 81 MILLIGRAM(S): at 09:24

## 2020-01-11 RX ADMIN — Medication 5 MILLIGRAM(S): at 22:40

## 2020-01-11 RX ADMIN — AMLODIPINE BESYLATE 2.5 MILLIGRAM(S): 2.5 TABLET ORAL at 09:24

## 2020-01-11 RX ADMIN — LIDOCAINE 1 PATCH: 4 CREAM TOPICAL at 09:24

## 2020-01-11 RX ADMIN — Medication 2000 UNIT(S): at 09:24

## 2020-01-11 RX ADMIN — OLANZAPINE 10 MILLIGRAM(S): 15 TABLET, FILM COATED ORAL at 22:42

## 2020-01-11 RX ADMIN — Medication 20 MILLIGRAM(S): at 09:24

## 2020-01-11 RX ADMIN — Medication 5 MILLIGRAM(S): at 12:09

## 2020-01-11 RX ADMIN — Medication 30 MILLIGRAM(S): at 07:22

## 2020-01-11 RX ADMIN — Medication 0.5 MILLIGRAM(S): at 13:45

## 2020-01-11 RX ADMIN — Medication 1 SPRAY(S): at 22:41

## 2020-01-11 RX ADMIN — MONTELUKAST 10 MILLIGRAM(S): 4 TABLET, CHEWABLE ORAL at 09:25

## 2020-01-11 RX ADMIN — Medication 1 MILLIGRAM(S): at 09:24

## 2020-01-11 RX ADMIN — Medication 125 MICROGRAM(S): at 09:24

## 2020-01-11 RX ADMIN — CLOPIDOGREL BISULFATE 75 MILLIGRAM(S): 75 TABLET, FILM COATED ORAL at 09:24

## 2020-01-11 RX ADMIN — TIOTROPIUM BROMIDE 1 CAPSULE(S): 18 CAPSULE ORAL; RESPIRATORY (INHALATION) at 09:25

## 2020-01-11 RX ADMIN — Medication 3 MILLIGRAM(S): at 22:41

## 2020-01-11 RX ADMIN — Medication 650 MILLIGRAM(S): at 17:40

## 2020-01-11 RX ADMIN — LOSARTAN POTASSIUM 100 MILLIGRAM(S): 100 TABLET, FILM COATED ORAL at 09:25

## 2020-01-11 RX ADMIN — ATORVASTATIN CALCIUM 40 MILLIGRAM(S): 80 TABLET, FILM COATED ORAL at 22:38

## 2020-01-11 RX ADMIN — PREGABALIN 1000 MICROGRAM(S): 225 CAPSULE ORAL at 09:24

## 2020-01-11 RX ADMIN — Medication 650 MILLIGRAM(S): at 10:01

## 2020-01-11 RX ADMIN — Medication 1 MILLIGRAM(S): at 17:40

## 2020-01-11 RX ADMIN — OLANZAPINE 10 MILLIGRAM(S): 15 TABLET, FILM COATED ORAL at 09:25

## 2020-01-11 RX ADMIN — Medication 650 MILLIGRAM(S): at 17:39

## 2020-01-11 RX ADMIN — LIDOCAINE 1 PATCH: 4 CREAM TOPICAL at 19:25

## 2020-01-11 RX ADMIN — NYSTATIN CREAM 1 APPLICATION(S): 100000 CREAM TOPICAL at 22:41

## 2020-01-12 PROCEDURE — 99231 SBSQ HOSP IP/OBS SF/LOW 25: CPT

## 2020-01-12 RX ADMIN — Medication 81 MILLIGRAM(S): at 08:28

## 2020-01-12 RX ADMIN — Medication 2000 UNIT(S): at 08:28

## 2020-01-12 RX ADMIN — LOSARTAN POTASSIUM 100 MILLIGRAM(S): 100 TABLET, FILM COATED ORAL at 08:29

## 2020-01-12 RX ADMIN — Medication 20 MILLIGRAM(S): at 07:18

## 2020-01-12 RX ADMIN — Medication 20 MILLIGRAM(S): at 08:28

## 2020-01-12 RX ADMIN — LIDOCAINE 1 PATCH: 4 CREAM TOPICAL at 18:05

## 2020-01-12 RX ADMIN — CLOPIDOGREL BISULFATE 75 MILLIGRAM(S): 75 TABLET, FILM COATED ORAL at 08:28

## 2020-01-12 RX ADMIN — NYSTATIN CREAM 1 APPLICATION(S): 100000 CREAM TOPICAL at 21:54

## 2020-01-12 RX ADMIN — Medication 5 MILLIGRAM(S): at 12:37

## 2020-01-12 RX ADMIN — OLANZAPINE 10 MILLIGRAM(S): 15 TABLET, FILM COATED ORAL at 21:55

## 2020-01-12 RX ADMIN — ATORVASTATIN CALCIUM 40 MILLIGRAM(S): 80 TABLET, FILM COATED ORAL at 21:54

## 2020-01-12 RX ADMIN — MONTELUKAST 10 MILLIGRAM(S): 4 TABLET, CHEWABLE ORAL at 08:29

## 2020-01-12 RX ADMIN — Medication 5 MILLIGRAM(S): at 21:54

## 2020-01-12 RX ADMIN — AMLODIPINE BESYLATE 2.5 MILLIGRAM(S): 2.5 TABLET ORAL at 08:28

## 2020-01-12 RX ADMIN — LIDOCAINE 1 PATCH: 4 CREAM TOPICAL at 08:28

## 2020-01-12 RX ADMIN — OLANZAPINE 10 MILLIGRAM(S): 15 TABLET, FILM COATED ORAL at 08:29

## 2020-01-12 RX ADMIN — Medication 1 MILLIGRAM(S): at 08:29

## 2020-01-12 RX ADMIN — PREGABALIN 1000 MICROGRAM(S): 225 CAPSULE ORAL at 08:28

## 2020-01-12 RX ADMIN — Medication 650 MILLIGRAM(S): at 12:37

## 2020-01-12 RX ADMIN — TIOTROPIUM BROMIDE 1 CAPSULE(S): 18 CAPSULE ORAL; RESPIRATORY (INHALATION) at 08:29

## 2020-01-12 RX ADMIN — Medication 1 MILLIGRAM(S): at 23:02

## 2020-01-12 RX ADMIN — Medication 1 MILLIGRAM(S): at 08:28

## 2020-01-12 RX ADMIN — Medication 5 MILLIGRAM(S): at 08:28

## 2020-01-12 RX ADMIN — LIDOCAINE 1 PATCH: 4 CREAM TOPICAL at 21:48

## 2020-01-12 RX ADMIN — Medication 1 MILLIGRAM(S): at 16:22

## 2020-01-12 RX ADMIN — Medication 125 MICROGRAM(S): at 08:28

## 2020-01-12 RX ADMIN — Medication 3 MILLIGRAM(S): at 21:54

## 2020-01-12 RX ADMIN — Medication 650 MILLIGRAM(S): at 13:15

## 2020-01-13 PROCEDURE — 90870 ELECTROCONVULSIVE THERAPY: CPT

## 2020-01-13 PROCEDURE — 99232 SBSQ HOSP IP/OBS MODERATE 35: CPT | Mod: 25

## 2020-01-13 RX ORDER — MIDAZOLAM HYDROCHLORIDE 1 MG/ML
2 INJECTION, SOLUTION INTRAMUSCULAR; INTRAVENOUS ONCE
Refills: 0 | Status: DISCONTINUED | OUTPATIENT
Start: 2020-01-13 | End: 2020-01-13

## 2020-01-13 RX ORDER — MIDAZOLAM HYDROCHLORIDE 1 MG/ML
1 INJECTION, SOLUTION INTRAMUSCULAR; INTRAVENOUS ONCE
Refills: 0 | Status: DISCONTINUED | OUTPATIENT
Start: 2020-01-13 | End: 2020-01-13

## 2020-01-13 RX ORDER — FLUMAZENIL 0.1 MG/ML
0.2 VIAL (ML) INTRAVENOUS ONCE
Refills: 0 | Status: COMPLETED | OUTPATIENT
Start: 2020-01-13 | End: 2020-01-13

## 2020-01-13 RX ADMIN — Medication 1 MILLIGRAM(S): at 14:00

## 2020-01-13 RX ADMIN — Medication 5 MILLIGRAM(S): at 21:32

## 2020-01-13 RX ADMIN — Medication 20 MILLIGRAM(S): at 06:49

## 2020-01-13 RX ADMIN — Medication 650 MILLIGRAM(S): at 20:35

## 2020-01-13 RX ADMIN — NYSTATIN CREAM 1 APPLICATION(S): 100000 CREAM TOPICAL at 21:33

## 2020-01-13 RX ADMIN — PREGABALIN 1000 MICROGRAM(S): 225 CAPSULE ORAL at 13:14

## 2020-01-13 RX ADMIN — Medication 81 MILLIGRAM(S): at 13:14

## 2020-01-13 RX ADMIN — TIOTROPIUM BROMIDE 1 CAPSULE(S): 18 CAPSULE ORAL; RESPIRATORY (INHALATION) at 13:15

## 2020-01-13 RX ADMIN — Medication 2 MILLIGRAM(S): at 14:50

## 2020-01-13 RX ADMIN — Medication 3 MILLIGRAM(S): at 21:33

## 2020-01-13 RX ADMIN — MONTELUKAST 10 MILLIGRAM(S): 4 TABLET, CHEWABLE ORAL at 13:15

## 2020-01-13 RX ADMIN — MIDAZOLAM HYDROCHLORIDE 1 MILLIGRAM(S): 1 INJECTION, SOLUTION INTRAMUSCULAR; INTRAVENOUS at 11:58

## 2020-01-13 RX ADMIN — Medication 1 MILLIGRAM(S): at 13:14

## 2020-01-13 RX ADMIN — Medication 650 MILLIGRAM(S): at 19:40

## 2020-01-13 RX ADMIN — Medication 5 MILLIGRAM(S): at 13:14

## 2020-01-13 RX ADMIN — OLANZAPINE 10 MILLIGRAM(S): 15 TABLET, FILM COATED ORAL at 21:33

## 2020-01-13 RX ADMIN — OLANZAPINE 10 MILLIGRAM(S): 15 TABLET, FILM COATED ORAL at 13:15

## 2020-01-13 RX ADMIN — Medication 125 MICROGRAM(S): at 13:14

## 2020-01-13 RX ADMIN — AMLODIPINE BESYLATE 2.5 MILLIGRAM(S): 2.5 TABLET ORAL at 13:14

## 2020-01-13 RX ADMIN — Medication 20 MILLIGRAM(S): at 13:14

## 2020-01-13 RX ADMIN — ATORVASTATIN CALCIUM 40 MILLIGRAM(S): 80 TABLET, FILM COATED ORAL at 21:32

## 2020-01-13 RX ADMIN — LOSARTAN POTASSIUM 100 MILLIGRAM(S): 100 TABLET, FILM COATED ORAL at 13:16

## 2020-01-13 RX ADMIN — Medication 2000 UNIT(S): at 13:14

## 2020-01-13 RX ADMIN — Medication 0.2 MILLIGRAM(S): at 11:05

## 2020-01-13 RX ADMIN — LIDOCAINE 1 PATCH: 4 CREAM TOPICAL at 20:12

## 2020-01-13 RX ADMIN — CLOPIDOGREL BISULFATE 75 MILLIGRAM(S): 75 TABLET, FILM COATED ORAL at 13:14

## 2020-01-13 RX ADMIN — LIDOCAINE 1 PATCH: 4 CREAM TOPICAL at 13:14

## 2020-01-14 PROCEDURE — 99232 SBSQ HOSP IP/OBS MODERATE 35: CPT

## 2020-01-14 RX ORDER — AMLODIPINE BESYLATE 2.5 MG/1
2.5 TABLET ORAL
Refills: 0 | Status: DISCONTINUED | OUTPATIENT
Start: 2020-01-15 | End: 2020-01-30

## 2020-01-14 RX ORDER — LOSARTAN POTASSIUM 100 MG/1
100 TABLET, FILM COATED ORAL
Refills: 0 | Status: DISCONTINUED | OUTPATIENT
Start: 2020-01-15 | End: 2020-01-30

## 2020-01-14 RX ADMIN — Medication 125 MICROGRAM(S): at 09:27

## 2020-01-14 RX ADMIN — OLANZAPINE 10 MILLIGRAM(S): 15 TABLET, FILM COATED ORAL at 20:27

## 2020-01-14 RX ADMIN — LIDOCAINE 1 PATCH: 4 CREAM TOPICAL at 05:46

## 2020-01-14 RX ADMIN — OLANZAPINE 10 MILLIGRAM(S): 15 TABLET, FILM COATED ORAL at 09:27

## 2020-01-14 RX ADMIN — Medication 5 MILLIGRAM(S): at 20:27

## 2020-01-14 RX ADMIN — Medication 3 MILLIGRAM(S): at 20:27

## 2020-01-14 RX ADMIN — Medication 5 MILLIGRAM(S): at 12:23

## 2020-01-14 RX ADMIN — Medication 2000 UNIT(S): at 09:27

## 2020-01-14 RX ADMIN — Medication 1 MILLIGRAM(S): at 09:27

## 2020-01-14 RX ADMIN — Medication 81 MILLIGRAM(S): at 09:27

## 2020-01-14 RX ADMIN — PANTOPRAZOLE SODIUM 40 MILLIGRAM(S): 20 TABLET, DELAYED RELEASE ORAL at 09:27

## 2020-01-14 RX ADMIN — Medication 20 MILLIGRAM(S): at 09:27

## 2020-01-14 RX ADMIN — NYSTATIN CREAM 1 APPLICATION(S): 100000 CREAM TOPICAL at 20:27

## 2020-01-14 RX ADMIN — Medication 1 MILLIGRAM(S): at 10:09

## 2020-01-14 RX ADMIN — Medication 5 MILLIGRAM(S): at 09:27

## 2020-01-14 RX ADMIN — PREGABALIN 1000 MICROGRAM(S): 225 CAPSULE ORAL at 09:27

## 2020-01-14 RX ADMIN — ATORVASTATIN CALCIUM 40 MILLIGRAM(S): 80 TABLET, FILM COATED ORAL at 20:26

## 2020-01-14 RX ADMIN — Medication 20 MILLIGRAM(S): at 06:56

## 2020-01-14 RX ADMIN — MONTELUKAST 10 MILLIGRAM(S): 4 TABLET, CHEWABLE ORAL at 09:27

## 2020-01-14 RX ADMIN — LOSARTAN POTASSIUM 100 MILLIGRAM(S): 100 TABLET, FILM COATED ORAL at 09:27

## 2020-01-14 RX ADMIN — TIOTROPIUM BROMIDE 1 CAPSULE(S): 18 CAPSULE ORAL; RESPIRATORY (INHALATION) at 09:27

## 2020-01-14 RX ADMIN — CLOPIDOGREL BISULFATE 75 MILLIGRAM(S): 75 TABLET, FILM COATED ORAL at 09:27

## 2020-01-14 RX ADMIN — AMLODIPINE BESYLATE 2.5 MILLIGRAM(S): 2.5 TABLET ORAL at 09:27

## 2020-01-15 PROCEDURE — 90870 ELECTROCONVULSIVE THERAPY: CPT

## 2020-01-15 PROCEDURE — 99232 SBSQ HOSP IP/OBS MODERATE 35: CPT | Mod: 25

## 2020-01-15 RX ORDER — MIDAZOLAM HYDROCHLORIDE 1 MG/ML
1 INJECTION, SOLUTION INTRAMUSCULAR; INTRAVENOUS ONCE
Refills: 0 | Status: DISCONTINUED | OUTPATIENT
Start: 2020-01-15 | End: 2020-01-15

## 2020-01-15 RX ORDER — FLUMAZENIL 0.1 MG/ML
0.2 VIAL (ML) INTRAVENOUS ONCE
Refills: 0 | Status: COMPLETED | OUTPATIENT
Start: 2020-01-15 | End: 2020-01-15

## 2020-01-15 RX ADMIN — LIDOCAINE 1 PATCH: 4 CREAM TOPICAL at 12:43

## 2020-01-15 RX ADMIN — Medication 20 MILLIGRAM(S): at 06:58

## 2020-01-15 RX ADMIN — MIDAZOLAM HYDROCHLORIDE 1 MILLIGRAM(S): 1 INJECTION, SOLUTION INTRAMUSCULAR; INTRAVENOUS at 10:24

## 2020-01-15 RX ADMIN — LOSARTAN POTASSIUM 100 MILLIGRAM(S): 100 TABLET, FILM COATED ORAL at 06:58

## 2020-01-15 RX ADMIN — OLANZAPINE 10 MILLIGRAM(S): 15 TABLET, FILM COATED ORAL at 12:43

## 2020-01-15 RX ADMIN — ATORVASTATIN CALCIUM 40 MILLIGRAM(S): 80 TABLET, FILM COATED ORAL at 21:26

## 2020-01-15 RX ADMIN — Medication 650 MILLIGRAM(S): at 15:00

## 2020-01-15 RX ADMIN — Medication 5 MILLIGRAM(S): at 21:26

## 2020-01-15 RX ADMIN — Medication 650 MILLIGRAM(S): at 14:00

## 2020-01-15 RX ADMIN — Medication 81 MILLIGRAM(S): at 12:42

## 2020-01-15 RX ADMIN — Medication 1 MILLIGRAM(S): at 20:02

## 2020-01-15 RX ADMIN — Medication 1 MILLIGRAM(S): at 12:43

## 2020-01-15 RX ADMIN — MONTELUKAST 10 MILLIGRAM(S): 4 TABLET, CHEWABLE ORAL at 12:43

## 2020-01-15 RX ADMIN — Medication 2000 UNIT(S): at 12:42

## 2020-01-15 RX ADMIN — LIDOCAINE 1 PATCH: 4 CREAM TOPICAL at 18:46

## 2020-01-15 RX ADMIN — TIOTROPIUM BROMIDE 1 CAPSULE(S): 18 CAPSULE ORAL; RESPIRATORY (INHALATION) at 12:43

## 2020-01-15 RX ADMIN — Medication 3 MILLIGRAM(S): at 21:26

## 2020-01-15 RX ADMIN — Medication 5 MILLIGRAM(S): at 12:44

## 2020-01-15 RX ADMIN — Medication 20 MILLIGRAM(S): at 12:43

## 2020-01-15 RX ADMIN — PREGABALIN 1000 MICROGRAM(S): 225 CAPSULE ORAL at 12:42

## 2020-01-15 RX ADMIN — OLANZAPINE 10 MILLIGRAM(S): 15 TABLET, FILM COATED ORAL at 21:26

## 2020-01-15 RX ADMIN — CLOPIDOGREL BISULFATE 75 MILLIGRAM(S): 75 TABLET, FILM COATED ORAL at 12:42

## 2020-01-15 RX ADMIN — Medication 0.2 MILLIGRAM(S): at 10:19

## 2020-01-15 RX ADMIN — Medication 125 MICROGRAM(S): at 12:43

## 2020-01-15 RX ADMIN — NYSTATIN CREAM 1 APPLICATION(S): 100000 CREAM TOPICAL at 22:41

## 2020-01-15 RX ADMIN — AMLODIPINE BESYLATE 2.5 MILLIGRAM(S): 2.5 TABLET ORAL at 06:58

## 2020-01-16 PROCEDURE — 99232 SBSQ HOSP IP/OBS MODERATE 35: CPT

## 2020-01-16 RX ADMIN — Medication 3 MILLIGRAM(S): at 21:07

## 2020-01-16 RX ADMIN — Medication 650 MILLIGRAM(S): at 07:00

## 2020-01-16 RX ADMIN — Medication 125 MICROGRAM(S): at 08:30

## 2020-01-16 RX ADMIN — Medication 5 MILLIGRAM(S): at 08:30

## 2020-01-16 RX ADMIN — TIOTROPIUM BROMIDE 1 CAPSULE(S): 18 CAPSULE ORAL; RESPIRATORY (INHALATION) at 08:32

## 2020-01-16 RX ADMIN — Medication 2000 UNIT(S): at 08:30

## 2020-01-16 RX ADMIN — LIDOCAINE 1 PATCH: 4 CREAM TOPICAL at 07:00

## 2020-01-16 RX ADMIN — LIDOCAINE 1 PATCH: 4 CREAM TOPICAL at 08:33

## 2020-01-16 RX ADMIN — Medication 10 MILLIGRAM(S): at 06:43

## 2020-01-16 RX ADMIN — ATORVASTATIN CALCIUM 40 MILLIGRAM(S): 80 TABLET, FILM COATED ORAL at 21:06

## 2020-01-16 RX ADMIN — OLANZAPINE 10 MILLIGRAM(S): 15 TABLET, FILM COATED ORAL at 21:07

## 2020-01-16 RX ADMIN — Medication 1 MILLIGRAM(S): at 16:00

## 2020-01-16 RX ADMIN — PREGABALIN 1000 MICROGRAM(S): 225 CAPSULE ORAL at 08:30

## 2020-01-16 RX ADMIN — OLANZAPINE 10 MILLIGRAM(S): 15 TABLET, FILM COATED ORAL at 08:30

## 2020-01-16 RX ADMIN — Medication 20 MILLIGRAM(S): at 08:30

## 2020-01-16 RX ADMIN — MONTELUKAST 10 MILLIGRAM(S): 4 TABLET, CHEWABLE ORAL at 08:30

## 2020-01-16 RX ADMIN — LIDOCAINE 1 PATCH: 4 CREAM TOPICAL at 21:44

## 2020-01-16 RX ADMIN — CLOPIDOGREL BISULFATE 75 MILLIGRAM(S): 75 TABLET, FILM COATED ORAL at 08:30

## 2020-01-16 RX ADMIN — NYSTATIN CREAM 1 APPLICATION(S): 100000 CREAM TOPICAL at 21:07

## 2020-01-16 RX ADMIN — Medication 5 MILLIGRAM(S): at 21:07

## 2020-01-16 RX ADMIN — NYSTATIN CREAM 1 APPLICATION(S): 100000 CREAM TOPICAL at 08:33

## 2020-01-16 RX ADMIN — Medication 5 MILLIGRAM(S): at 12:31

## 2020-01-16 RX ADMIN — Medication 81 MILLIGRAM(S): at 08:30

## 2020-01-16 RX ADMIN — Medication 1 MILLIGRAM(S): at 08:30

## 2020-01-16 RX ADMIN — AMLODIPINE BESYLATE 2.5 MILLIGRAM(S): 2.5 TABLET ORAL at 06:44

## 2020-01-16 RX ADMIN — LIDOCAINE 1 PATCH: 4 CREAM TOPICAL at 18:36

## 2020-01-16 RX ADMIN — LOSARTAN POTASSIUM 100 MILLIGRAM(S): 100 TABLET, FILM COATED ORAL at 06:44

## 2020-01-17 PROCEDURE — 99232 SBSQ HOSP IP/OBS MODERATE 35: CPT | Mod: 25

## 2020-01-17 PROCEDURE — 90870 ELECTROCONVULSIVE THERAPY: CPT

## 2020-01-17 RX ORDER — MIDAZOLAM HYDROCHLORIDE 1 MG/ML
1 INJECTION, SOLUTION INTRAMUSCULAR; INTRAVENOUS ONCE
Refills: 0 | Status: DISCONTINUED | OUTPATIENT
Start: 2020-01-17 | End: 2020-01-17

## 2020-01-17 RX ORDER — FLUMAZENIL 0.1 MG/ML
0.2 VIAL (ML) INTRAVENOUS ONCE
Refills: 0 | Status: COMPLETED | OUTPATIENT
Start: 2020-01-17 | End: 2020-01-17

## 2020-01-17 RX ORDER — SERTRALINE 25 MG/1
25 TABLET, FILM COATED ORAL DAILY
Refills: 0 | Status: DISCONTINUED | OUTPATIENT
Start: 2020-01-18 | End: 2020-01-21

## 2020-01-17 RX ADMIN — Medication 2000 UNIT(S): at 12:31

## 2020-01-17 RX ADMIN — MIDAZOLAM HYDROCHLORIDE 1 MILLIGRAM(S): 1 INJECTION, SOLUTION INTRAMUSCULAR; INTRAVENOUS at 08:59

## 2020-01-17 RX ADMIN — LIDOCAINE 1 PATCH: 4 CREAM TOPICAL at 21:17

## 2020-01-17 RX ADMIN — Medication 3 MILLIGRAM(S): at 21:17

## 2020-01-17 RX ADMIN — TIOTROPIUM BROMIDE 1 CAPSULE(S): 18 CAPSULE ORAL; RESPIRATORY (INHALATION) at 12:34

## 2020-01-17 RX ADMIN — Medication 5 MILLIGRAM(S): at 21:16

## 2020-01-17 RX ADMIN — LIDOCAINE 1 PATCH: 4 CREAM TOPICAL at 12:33

## 2020-01-17 RX ADMIN — MONTELUKAST 10 MILLIGRAM(S): 4 TABLET, CHEWABLE ORAL at 12:34

## 2020-01-17 RX ADMIN — ATORVASTATIN CALCIUM 40 MILLIGRAM(S): 80 TABLET, FILM COATED ORAL at 21:16

## 2020-01-17 RX ADMIN — CLOPIDOGREL BISULFATE 75 MILLIGRAM(S): 75 TABLET, FILM COATED ORAL at 12:31

## 2020-01-17 RX ADMIN — LOSARTAN POTASSIUM 100 MILLIGRAM(S): 100 TABLET, FILM COATED ORAL at 06:56

## 2020-01-17 RX ADMIN — Medication 3 MILLILITER(S): at 03:00

## 2020-01-17 RX ADMIN — OLANZAPINE 10 MILLIGRAM(S): 15 TABLET, FILM COATED ORAL at 12:34

## 2020-01-17 RX ADMIN — Medication 20 MILLIGRAM(S): at 12:33

## 2020-01-17 RX ADMIN — Medication 125 MICROGRAM(S): at 12:33

## 2020-01-17 RX ADMIN — Medication 5 MILLIGRAM(S): at 12:31

## 2020-01-17 RX ADMIN — Medication 5 MILLIGRAM(S): at 17:56

## 2020-01-17 RX ADMIN — Medication 81 MILLIGRAM(S): at 12:31

## 2020-01-17 RX ADMIN — Medication 1 MILLIGRAM(S): at 16:06

## 2020-01-17 RX ADMIN — AMLODIPINE BESYLATE 2.5 MILLIGRAM(S): 2.5 TABLET ORAL at 06:56

## 2020-01-17 RX ADMIN — PREGABALIN 1000 MICROGRAM(S): 225 CAPSULE ORAL at 12:31

## 2020-01-17 RX ADMIN — Medication 10 MILLIGRAM(S): at 06:56

## 2020-01-17 RX ADMIN — Medication 0.2 MILLIGRAM(S): at 08:49

## 2020-01-17 RX ADMIN — Medication 1 MILLIGRAM(S): at 12:33

## 2020-01-17 RX ADMIN — OLANZAPINE 10 MILLIGRAM(S): 15 TABLET, FILM COATED ORAL at 21:17

## 2020-01-18 RX ORDER — ACETAMINOPHEN 500 MG
650 TABLET ORAL EVERY 6 HOURS
Refills: 0 | Status: DISCONTINUED | OUTPATIENT
Start: 2020-01-18 | End: 2020-01-30

## 2020-01-18 RX ADMIN — MONTELUKAST 10 MILLIGRAM(S): 4 TABLET, CHEWABLE ORAL at 10:38

## 2020-01-18 RX ADMIN — Medication 20 MILLIGRAM(S): at 10:38

## 2020-01-18 RX ADMIN — OLANZAPINE 10 MILLIGRAM(S): 15 TABLET, FILM COATED ORAL at 10:39

## 2020-01-18 RX ADMIN — AMLODIPINE BESYLATE 2.5 MILLIGRAM(S): 2.5 TABLET ORAL at 09:17

## 2020-01-18 RX ADMIN — NYSTATIN CREAM 1 APPLICATION(S): 100000 CREAM TOPICAL at 21:37

## 2020-01-18 RX ADMIN — Medication 5 MILLIGRAM(S): at 21:37

## 2020-01-18 RX ADMIN — LIDOCAINE 1 PATCH: 4 CREAM TOPICAL at 19:55

## 2020-01-18 RX ADMIN — LIDOCAINE 1 PATCH: 4 CREAM TOPICAL at 21:38

## 2020-01-18 RX ADMIN — LOSARTAN POTASSIUM 100 MILLIGRAM(S): 100 TABLET, FILM COATED ORAL at 06:55

## 2020-01-18 RX ADMIN — CLOPIDOGREL BISULFATE 75 MILLIGRAM(S): 75 TABLET, FILM COATED ORAL at 10:37

## 2020-01-18 RX ADMIN — Medication 3 MILLIGRAM(S): at 21:37

## 2020-01-18 RX ADMIN — Medication 125 MICROGRAM(S): at 10:38

## 2020-01-18 RX ADMIN — PREGABALIN 1000 MICROGRAM(S): 225 CAPSULE ORAL at 10:37

## 2020-01-18 RX ADMIN — Medication 10 MILLIGRAM(S): at 06:55

## 2020-01-18 RX ADMIN — Medication 650 MILLIGRAM(S): at 14:08

## 2020-01-18 RX ADMIN — Medication 5 MILLIGRAM(S): at 14:08

## 2020-01-18 RX ADMIN — ATORVASTATIN CALCIUM 40 MILLIGRAM(S): 80 TABLET, FILM COATED ORAL at 21:37

## 2020-01-18 RX ADMIN — Medication 81 MILLIGRAM(S): at 10:37

## 2020-01-18 RX ADMIN — TIOTROPIUM BROMIDE 1 CAPSULE(S): 18 CAPSULE ORAL; RESPIRATORY (INHALATION) at 10:39

## 2020-01-18 RX ADMIN — Medication 2000 UNIT(S): at 10:37

## 2020-01-18 RX ADMIN — LIDOCAINE 1 PATCH: 4 CREAM TOPICAL at 10:38

## 2020-01-18 RX ADMIN — OLANZAPINE 10 MILLIGRAM(S): 15 TABLET, FILM COATED ORAL at 21:38

## 2020-01-18 RX ADMIN — SERTRALINE 25 MILLIGRAM(S): 25 TABLET, FILM COATED ORAL at 10:39

## 2020-01-18 RX ADMIN — Medication 1 MILLIGRAM(S): at 10:38

## 2020-01-18 RX ADMIN — Medication 650 MILLIGRAM(S): at 17:56

## 2020-01-18 RX ADMIN — Medication 5 MILLIGRAM(S): at 10:37

## 2020-01-19 RX ADMIN — Medication 650 MILLIGRAM(S): at 17:51

## 2020-01-19 RX ADMIN — Medication 5 MILLIGRAM(S): at 13:49

## 2020-01-19 RX ADMIN — Medication 1 MILLIGRAM(S): at 11:44

## 2020-01-19 RX ADMIN — NYSTATIN CREAM 1 APPLICATION(S): 100000 CREAM TOPICAL at 11:43

## 2020-01-19 RX ADMIN — Medication 3 MILLIGRAM(S): at 21:22

## 2020-01-19 RX ADMIN — TIOTROPIUM BROMIDE 1 CAPSULE(S): 18 CAPSULE ORAL; RESPIRATORY (INHALATION) at 11:44

## 2020-01-19 RX ADMIN — PREGABALIN 1000 MICROGRAM(S): 225 CAPSULE ORAL at 11:45

## 2020-01-19 RX ADMIN — LIDOCAINE 1 PATCH: 4 CREAM TOPICAL at 19:55

## 2020-01-19 RX ADMIN — MONTELUKAST 10 MILLIGRAM(S): 4 TABLET, CHEWABLE ORAL at 11:43

## 2020-01-19 RX ADMIN — SERTRALINE 25 MILLIGRAM(S): 25 TABLET, FILM COATED ORAL at 11:43

## 2020-01-19 RX ADMIN — AMLODIPINE BESYLATE 2.5 MILLIGRAM(S): 2.5 TABLET ORAL at 06:59

## 2020-01-19 RX ADMIN — ATORVASTATIN CALCIUM 40 MILLIGRAM(S): 80 TABLET, FILM COATED ORAL at 21:22

## 2020-01-19 RX ADMIN — LIDOCAINE 1 PATCH: 4 CREAM TOPICAL at 23:25

## 2020-01-19 RX ADMIN — Medication 650 MILLIGRAM(S): at 20:11

## 2020-01-19 RX ADMIN — Medication 650 MILLIGRAM(S): at 02:27

## 2020-01-19 RX ADMIN — Medication 5 MILLIGRAM(S): at 11:45

## 2020-01-19 RX ADMIN — CLOPIDOGREL BISULFATE 75 MILLIGRAM(S): 75 TABLET, FILM COATED ORAL at 11:45

## 2020-01-19 RX ADMIN — LOSARTAN POTASSIUM 100 MILLIGRAM(S): 100 TABLET, FILM COATED ORAL at 06:59

## 2020-01-19 RX ADMIN — Medication 125 MICROGRAM(S): at 11:44

## 2020-01-19 RX ADMIN — Medication 5 MILLIGRAM(S): at 21:22

## 2020-01-19 RX ADMIN — Medication 2000 UNIT(S): at 11:45

## 2020-01-19 RX ADMIN — Medication 650 MILLIGRAM(S): at 03:25

## 2020-01-19 RX ADMIN — OLANZAPINE 10 MILLIGRAM(S): 15 TABLET, FILM COATED ORAL at 21:22

## 2020-01-19 RX ADMIN — LIDOCAINE 1 PATCH: 4 CREAM TOPICAL at 11:44

## 2020-01-19 RX ADMIN — OLANZAPINE 10 MILLIGRAM(S): 15 TABLET, FILM COATED ORAL at 11:43

## 2020-01-19 RX ADMIN — Medication 20 MILLIGRAM(S): at 11:44

## 2020-01-19 RX ADMIN — Medication 10 MILLIGRAM(S): at 06:59

## 2020-01-19 RX ADMIN — Medication 81 MILLIGRAM(S): at 11:45

## 2020-01-19 RX ADMIN — NYSTATIN CREAM 1 APPLICATION(S): 100000 CREAM TOPICAL at 21:22

## 2020-01-20 RX ADMIN — Medication 1 SPRAY(S): at 20:45

## 2020-01-20 RX ADMIN — ATORVASTATIN CALCIUM 40 MILLIGRAM(S): 80 TABLET, FILM COATED ORAL at 20:44

## 2020-01-20 RX ADMIN — Medication 81 MILLIGRAM(S): at 10:03

## 2020-01-20 RX ADMIN — LIDOCAINE 1 PATCH: 4 CREAM TOPICAL at 10:04

## 2020-01-20 RX ADMIN — Medication 5 MILLIGRAM(S): at 20:44

## 2020-01-20 RX ADMIN — Medication 125 MICROGRAM(S): at 10:04

## 2020-01-20 RX ADMIN — Medication 5 MILLIGRAM(S): at 10:03

## 2020-01-20 RX ADMIN — LOSARTAN POTASSIUM 100 MILLIGRAM(S): 100 TABLET, FILM COATED ORAL at 06:45

## 2020-01-20 RX ADMIN — Medication 3 MILLIGRAM(S): at 20:45

## 2020-01-20 RX ADMIN — OLANZAPINE 10 MILLIGRAM(S): 15 TABLET, FILM COATED ORAL at 20:45

## 2020-01-20 RX ADMIN — MONTELUKAST 10 MILLIGRAM(S): 4 TABLET, CHEWABLE ORAL at 10:04

## 2020-01-20 RX ADMIN — AMLODIPINE BESYLATE 2.5 MILLIGRAM(S): 2.5 TABLET ORAL at 06:45

## 2020-01-20 RX ADMIN — Medication 2000 UNIT(S): at 10:03

## 2020-01-20 RX ADMIN — PANTOPRAZOLE SODIUM 40 MILLIGRAM(S): 20 TABLET, DELAYED RELEASE ORAL at 10:04

## 2020-01-20 RX ADMIN — Medication 1 MILLIGRAM(S): at 10:04

## 2020-01-20 RX ADMIN — Medication 10 MILLIGRAM(S): at 06:45

## 2020-01-20 RX ADMIN — LIDOCAINE 1 PATCH: 4 CREAM TOPICAL at 22:19

## 2020-01-20 RX ADMIN — Medication 5 MILLIGRAM(S): at 12:51

## 2020-01-20 RX ADMIN — SERTRALINE 25 MILLIGRAM(S): 25 TABLET, FILM COATED ORAL at 10:04

## 2020-01-20 RX ADMIN — TIOTROPIUM BROMIDE 1 CAPSULE(S): 18 CAPSULE ORAL; RESPIRATORY (INHALATION) at 10:06

## 2020-01-20 RX ADMIN — OLANZAPINE 10 MILLIGRAM(S): 15 TABLET, FILM COATED ORAL at 10:04

## 2020-01-20 RX ADMIN — CLOPIDOGREL BISULFATE 75 MILLIGRAM(S): 75 TABLET, FILM COATED ORAL at 10:03

## 2020-01-20 RX ADMIN — NYSTATIN CREAM 1 APPLICATION(S): 100000 CREAM TOPICAL at 20:45

## 2020-01-20 RX ADMIN — Medication 20 MILLIGRAM(S): at 10:04

## 2020-01-20 RX ADMIN — NYSTATIN CREAM 1 APPLICATION(S): 100000 CREAM TOPICAL at 10:04

## 2020-01-20 RX ADMIN — PREGABALIN 1000 MICROGRAM(S): 225 CAPSULE ORAL at 10:03

## 2020-01-21 PROCEDURE — 99232 SBSQ HOSP IP/OBS MODERATE 35: CPT

## 2020-01-21 RX ORDER — SERTRALINE 25 MG/1
50 TABLET, FILM COATED ORAL DAILY
Refills: 0 | Status: DISCONTINUED | OUTPATIENT
Start: 2020-01-22 | End: 2020-01-23

## 2020-01-21 RX ADMIN — LIDOCAINE 1 PATCH: 4 CREAM TOPICAL at 19:55

## 2020-01-21 RX ADMIN — OLANZAPINE 10 MILLIGRAM(S): 15 TABLET, FILM COATED ORAL at 20:37

## 2020-01-21 RX ADMIN — LIDOCAINE 1 PATCH: 4 CREAM TOPICAL at 09:52

## 2020-01-21 RX ADMIN — NYSTATIN CREAM 1 APPLICATION(S): 100000 CREAM TOPICAL at 20:37

## 2020-01-21 RX ADMIN — OLANZAPINE 10 MILLIGRAM(S): 15 TABLET, FILM COATED ORAL at 09:52

## 2020-01-21 RX ADMIN — Medication 10 MILLIGRAM(S): at 06:44

## 2020-01-21 RX ADMIN — AMLODIPINE BESYLATE 2.5 MILLIGRAM(S): 2.5 TABLET ORAL at 06:44

## 2020-01-21 RX ADMIN — LIDOCAINE 1 PATCH: 4 CREAM TOPICAL at 21:04

## 2020-01-21 RX ADMIN — NYSTATIN CREAM 1 APPLICATION(S): 100000 CREAM TOPICAL at 09:52

## 2020-01-21 RX ADMIN — Medication 5 MILLIGRAM(S): at 09:51

## 2020-01-21 RX ADMIN — SERTRALINE 25 MILLIGRAM(S): 25 TABLET, FILM COATED ORAL at 09:52

## 2020-01-21 RX ADMIN — LOSARTAN POTASSIUM 100 MILLIGRAM(S): 100 TABLET, FILM COATED ORAL at 06:44

## 2020-01-21 RX ADMIN — Medication 125 MICROGRAM(S): at 09:51

## 2020-01-21 RX ADMIN — Medication 1 MILLIGRAM(S): at 09:51

## 2020-01-21 RX ADMIN — Medication 5 MILLIGRAM(S): at 13:42

## 2020-01-21 RX ADMIN — Medication 650 MILLIGRAM(S): at 10:57

## 2020-01-21 RX ADMIN — Medication 81 MILLIGRAM(S): at 09:51

## 2020-01-21 RX ADMIN — CLOPIDOGREL BISULFATE 75 MILLIGRAM(S): 75 TABLET, FILM COATED ORAL at 09:51

## 2020-01-21 RX ADMIN — Medication 650 MILLIGRAM(S): at 10:23

## 2020-01-21 RX ADMIN — Medication 20 MILLIGRAM(S): at 09:51

## 2020-01-21 RX ADMIN — Medication 2000 UNIT(S): at 09:51

## 2020-01-21 RX ADMIN — ATORVASTATIN CALCIUM 40 MILLIGRAM(S): 80 TABLET, FILM COATED ORAL at 20:36

## 2020-01-21 RX ADMIN — Medication 1 SPRAY(S): at 09:51

## 2020-01-21 RX ADMIN — Medication 5 MILLIGRAM(S): at 20:36

## 2020-01-21 RX ADMIN — PREGABALIN 1000 MICROGRAM(S): 225 CAPSULE ORAL at 09:51

## 2020-01-21 RX ADMIN — Medication 3 MILLIGRAM(S): at 20:36

## 2020-01-21 RX ADMIN — MONTELUKAST 10 MILLIGRAM(S): 4 TABLET, CHEWABLE ORAL at 09:52

## 2020-01-21 RX ADMIN — TIOTROPIUM BROMIDE 1 CAPSULE(S): 18 CAPSULE ORAL; RESPIRATORY (INHALATION) at 09:53

## 2020-01-22 PROCEDURE — 99232 SBSQ HOSP IP/OBS MODERATE 35: CPT | Mod: 25

## 2020-01-22 PROCEDURE — 90870 ELECTROCONVULSIVE THERAPY: CPT

## 2020-01-22 RX ORDER — MIDAZOLAM HYDROCHLORIDE 1 MG/ML
1 INJECTION, SOLUTION INTRAMUSCULAR; INTRAVENOUS ONCE
Refills: 0 | Status: DISCONTINUED | OUTPATIENT
Start: 2020-01-22 | End: 2020-01-22

## 2020-01-22 RX ORDER — FLUMAZENIL 0.1 MG/ML
0.2 VIAL (ML) INTRAVENOUS ONCE
Refills: 0 | Status: COMPLETED | OUTPATIENT
Start: 2020-01-22 | End: 2020-01-22

## 2020-01-22 RX ADMIN — OLANZAPINE 10 MILLIGRAM(S): 15 TABLET, FILM COATED ORAL at 15:02

## 2020-01-22 RX ADMIN — PREGABALIN 1000 MICROGRAM(S): 225 CAPSULE ORAL at 15:01

## 2020-01-22 RX ADMIN — Medication 5 MILLIGRAM(S): at 15:00

## 2020-01-22 RX ADMIN — ATORVASTATIN CALCIUM 40 MILLIGRAM(S): 80 TABLET, FILM COATED ORAL at 21:04

## 2020-01-22 RX ADMIN — Medication 10 MILLIGRAM(S): at 06:52

## 2020-01-22 RX ADMIN — NYSTATIN CREAM 1 APPLICATION(S): 100000 CREAM TOPICAL at 21:04

## 2020-01-22 RX ADMIN — SERTRALINE 50 MILLIGRAM(S): 25 TABLET, FILM COATED ORAL at 15:02

## 2020-01-22 RX ADMIN — Medication 5 MILLIGRAM(S): at 21:04

## 2020-01-22 RX ADMIN — Medication 2000 UNIT(S): at 15:00

## 2020-01-22 RX ADMIN — Medication 81 MILLIGRAM(S): at 15:00

## 2020-01-22 RX ADMIN — LIDOCAINE 1 PATCH: 4 CREAM TOPICAL at 15:01

## 2020-01-22 RX ADMIN — PANTOPRAZOLE SODIUM 40 MILLIGRAM(S): 20 TABLET, DELAYED RELEASE ORAL at 15:02

## 2020-01-22 RX ADMIN — MONTELUKAST 10 MILLIGRAM(S): 4 TABLET, CHEWABLE ORAL at 15:01

## 2020-01-22 RX ADMIN — LOSARTAN POTASSIUM 100 MILLIGRAM(S): 100 TABLET, FILM COATED ORAL at 06:52

## 2020-01-22 RX ADMIN — MIDAZOLAM HYDROCHLORIDE 1 MILLIGRAM(S): 1 INJECTION, SOLUTION INTRAMUSCULAR; INTRAVENOUS at 12:54

## 2020-01-22 RX ADMIN — Medication 0.2 MILLIGRAM(S): at 12:48

## 2020-01-22 RX ADMIN — Medication 3 MILLIGRAM(S): at 21:04

## 2020-01-22 RX ADMIN — AMLODIPINE BESYLATE 2.5 MILLIGRAM(S): 2.5 TABLET ORAL at 06:52

## 2020-01-22 RX ADMIN — Medication 125 MICROGRAM(S): at 15:01

## 2020-01-22 RX ADMIN — Medication 1 MILLIGRAM(S): at 15:01

## 2020-01-22 RX ADMIN — LIDOCAINE 1 PATCH: 4 CREAM TOPICAL at 19:18

## 2020-01-22 RX ADMIN — Medication 20 MILLIGRAM(S): at 15:01

## 2020-01-22 RX ADMIN — CLOPIDOGREL BISULFATE 75 MILLIGRAM(S): 75 TABLET, FILM COATED ORAL at 15:01

## 2020-01-22 RX ADMIN — OLANZAPINE 10 MILLIGRAM(S): 15 TABLET, FILM COATED ORAL at 21:04

## 2020-01-23 LAB
ANION GAP SERPL CALC-SCNC: 9 MMO/L — SIGNIFICANT CHANGE UP (ref 7–14)
BUN SERPL-MCNC: 10 MG/DL — SIGNIFICANT CHANGE UP (ref 7–23)
CALCIUM SERPL-MCNC: 9.6 MG/DL — SIGNIFICANT CHANGE UP (ref 8.4–10.5)
CHLORIDE SERPL-SCNC: 107 MMOL/L — SIGNIFICANT CHANGE UP (ref 98–107)
CO2 SERPL-SCNC: 28 MMOL/L — SIGNIFICANT CHANGE UP (ref 22–31)
CREAT SERPL-MCNC: 0.52 MG/DL — SIGNIFICANT CHANGE UP (ref 0.5–1.3)
GLUCOSE SERPL-MCNC: 87 MG/DL — SIGNIFICANT CHANGE UP (ref 70–99)
POTASSIUM SERPL-MCNC: 3.5 MMOL/L — SIGNIFICANT CHANGE UP (ref 3.5–5.3)
POTASSIUM SERPL-SCNC: 3.5 MMOL/L — SIGNIFICANT CHANGE UP (ref 3.5–5.3)
SODIUM SERPL-SCNC: 144 MMOL/L — SIGNIFICANT CHANGE UP (ref 135–145)

## 2020-01-23 PROCEDURE — 99232 SBSQ HOSP IP/OBS MODERATE 35: CPT

## 2020-01-23 PROCEDURE — 90853 GROUP PSYCHOTHERAPY: CPT

## 2020-01-23 RX ORDER — SERTRALINE 25 MG/1
100 TABLET, FILM COATED ORAL DAILY
Refills: 0 | Status: DISCONTINUED | OUTPATIENT
Start: 2020-01-24 | End: 2020-01-27

## 2020-01-23 RX ADMIN — PREGABALIN 1000 MICROGRAM(S): 225 CAPSULE ORAL at 11:04

## 2020-01-23 RX ADMIN — Medication 3 MILLIGRAM(S): at 20:39

## 2020-01-23 RX ADMIN — AMLODIPINE BESYLATE 2.5 MILLIGRAM(S): 2.5 TABLET ORAL at 06:50

## 2020-01-23 RX ADMIN — MONTELUKAST 10 MILLIGRAM(S): 4 TABLET, CHEWABLE ORAL at 11:05

## 2020-01-23 RX ADMIN — CLOPIDOGREL BISULFATE 75 MILLIGRAM(S): 75 TABLET, FILM COATED ORAL at 11:04

## 2020-01-23 RX ADMIN — SERTRALINE 50 MILLIGRAM(S): 25 TABLET, FILM COATED ORAL at 11:06

## 2020-01-23 RX ADMIN — Medication 81 MILLIGRAM(S): at 11:04

## 2020-01-23 RX ADMIN — Medication 20 MILLIGRAM(S): at 11:04

## 2020-01-23 RX ADMIN — LIDOCAINE 1 PATCH: 4 CREAM TOPICAL at 11:04

## 2020-01-23 RX ADMIN — Medication 5 MILLIGRAM(S): at 20:40

## 2020-01-23 RX ADMIN — Medication 125 MICROGRAM(S): at 11:04

## 2020-01-23 RX ADMIN — Medication 2000 UNIT(S): at 11:04

## 2020-01-23 RX ADMIN — LIDOCAINE 1 PATCH: 4 CREAM TOPICAL at 19:30

## 2020-01-23 RX ADMIN — ATORVASTATIN CALCIUM 40 MILLIGRAM(S): 80 TABLET, FILM COATED ORAL at 20:40

## 2020-01-23 RX ADMIN — OLANZAPINE 10 MILLIGRAM(S): 15 TABLET, FILM COATED ORAL at 20:39

## 2020-01-23 RX ADMIN — Medication 10 MILLIGRAM(S): at 06:50

## 2020-01-23 RX ADMIN — OLANZAPINE 10 MILLIGRAM(S): 15 TABLET, FILM COATED ORAL at 11:05

## 2020-01-23 RX ADMIN — Medication 5 MILLIGRAM(S): at 11:04

## 2020-01-23 RX ADMIN — LOSARTAN POTASSIUM 100 MILLIGRAM(S): 100 TABLET, FILM COATED ORAL at 06:50

## 2020-01-23 RX ADMIN — Medication 5 MILLIGRAM(S): at 16:47

## 2020-01-23 RX ADMIN — LIDOCAINE 1 PATCH: 4 CREAM TOPICAL at 03:50

## 2020-01-24 PROCEDURE — 90870 ELECTROCONVULSIVE THERAPY: CPT

## 2020-01-24 PROCEDURE — 99232 SBSQ HOSP IP/OBS MODERATE 35: CPT | Mod: 25

## 2020-01-24 RX ORDER — MIDAZOLAM HYDROCHLORIDE 1 MG/ML
1 INJECTION, SOLUTION INTRAMUSCULAR; INTRAVENOUS ONCE
Refills: 0 | Status: DISCONTINUED | OUTPATIENT
Start: 2020-01-24 | End: 2020-01-24

## 2020-01-24 RX ORDER — FLUMAZENIL 0.1 MG/ML
0.2 VIAL (ML) INTRAVENOUS ONCE
Refills: 0 | Status: COMPLETED | OUTPATIENT
Start: 2020-01-24 | End: 2020-01-24

## 2020-01-24 RX ADMIN — Medication 5 MILLIGRAM(S): at 21:01

## 2020-01-24 RX ADMIN — TIOTROPIUM BROMIDE 1 CAPSULE(S): 18 CAPSULE ORAL; RESPIRATORY (INHALATION) at 11:13

## 2020-01-24 RX ADMIN — Medication 0.2 MILLIGRAM(S): at 09:04

## 2020-01-24 RX ADMIN — LIDOCAINE 1 PATCH: 4 CREAM TOPICAL at 19:48

## 2020-01-24 RX ADMIN — Medication 5 MILLIGRAM(S): at 16:48

## 2020-01-24 RX ADMIN — SERTRALINE 100 MILLIGRAM(S): 25 TABLET, FILM COATED ORAL at 11:12

## 2020-01-24 RX ADMIN — LOSARTAN POTASSIUM 100 MILLIGRAM(S): 100 TABLET, FILM COATED ORAL at 06:53

## 2020-01-24 RX ADMIN — Medication 81 MILLIGRAM(S): at 11:11

## 2020-01-24 RX ADMIN — Medication 20 MILLIGRAM(S): at 11:12

## 2020-01-24 RX ADMIN — ATORVASTATIN CALCIUM 40 MILLIGRAM(S): 80 TABLET, FILM COATED ORAL at 21:01

## 2020-01-24 RX ADMIN — Medication 2000 UNIT(S): at 11:11

## 2020-01-24 RX ADMIN — Medication 3 MILLIGRAM(S): at 21:01

## 2020-01-24 RX ADMIN — Medication 5 MILLIGRAM(S): at 11:11

## 2020-01-24 RX ADMIN — Medication 125 MICROGRAM(S): at 11:12

## 2020-01-24 RX ADMIN — LIDOCAINE 1 PATCH: 4 CREAM TOPICAL at 11:12

## 2020-01-24 RX ADMIN — OLANZAPINE 10 MILLIGRAM(S): 15 TABLET, FILM COATED ORAL at 21:02

## 2020-01-24 RX ADMIN — MIDAZOLAM HYDROCHLORIDE 1 MILLIGRAM(S): 1 INJECTION, SOLUTION INTRAMUSCULAR; INTRAVENOUS at 09:08

## 2020-01-24 RX ADMIN — CLOPIDOGREL BISULFATE 75 MILLIGRAM(S): 75 TABLET, FILM COATED ORAL at 11:11

## 2020-01-24 RX ADMIN — NYSTATIN CREAM 1 APPLICATION(S): 100000 CREAM TOPICAL at 21:01

## 2020-01-24 RX ADMIN — PREGABALIN 1000 MICROGRAM(S): 225 CAPSULE ORAL at 11:11

## 2020-01-24 RX ADMIN — LIDOCAINE 1 PATCH: 4 CREAM TOPICAL at 23:14

## 2020-01-24 RX ADMIN — AMLODIPINE BESYLATE 2.5 MILLIGRAM(S): 2.5 TABLET ORAL at 06:53

## 2020-01-24 RX ADMIN — Medication 10 MILLIGRAM(S): at 06:53

## 2020-01-24 RX ADMIN — OLANZAPINE 10 MILLIGRAM(S): 15 TABLET, FILM COATED ORAL at 11:12

## 2020-01-24 RX ADMIN — MONTELUKAST 10 MILLIGRAM(S): 4 TABLET, CHEWABLE ORAL at 11:12

## 2020-01-25 RX ADMIN — Medication 81 MILLIGRAM(S): at 08:41

## 2020-01-25 RX ADMIN — OLANZAPINE 10 MILLIGRAM(S): 15 TABLET, FILM COATED ORAL at 08:42

## 2020-01-25 RX ADMIN — Medication 5 MILLIGRAM(S): at 12:44

## 2020-01-25 RX ADMIN — Medication 125 MICROGRAM(S): at 08:42

## 2020-01-25 RX ADMIN — LOSARTAN POTASSIUM 100 MILLIGRAM(S): 100 TABLET, FILM COATED ORAL at 07:13

## 2020-01-25 RX ADMIN — MONTELUKAST 10 MILLIGRAM(S): 4 TABLET, CHEWABLE ORAL at 08:42

## 2020-01-25 RX ADMIN — Medication 3 MILLIGRAM(S): at 21:46

## 2020-01-25 RX ADMIN — TIOTROPIUM BROMIDE 1 CAPSULE(S): 18 CAPSULE ORAL; RESPIRATORY (INHALATION) at 08:42

## 2020-01-25 RX ADMIN — NYSTATIN CREAM 1 APPLICATION(S): 100000 CREAM TOPICAL at 21:46

## 2020-01-25 RX ADMIN — PREGABALIN 1000 MICROGRAM(S): 225 CAPSULE ORAL at 10:09

## 2020-01-25 RX ADMIN — CLOPIDOGREL BISULFATE 75 MILLIGRAM(S): 75 TABLET, FILM COATED ORAL at 08:42

## 2020-01-25 RX ADMIN — Medication 2000 UNIT(S): at 08:42

## 2020-01-25 RX ADMIN — SERTRALINE 100 MILLIGRAM(S): 25 TABLET, FILM COATED ORAL at 08:42

## 2020-01-25 RX ADMIN — ATORVASTATIN CALCIUM 40 MILLIGRAM(S): 80 TABLET, FILM COATED ORAL at 21:46

## 2020-01-25 RX ADMIN — Medication 5 MILLIGRAM(S): at 21:46

## 2020-01-25 RX ADMIN — Medication 5 MILLIGRAM(S): at 08:41

## 2020-01-25 RX ADMIN — LIDOCAINE 1 PATCH: 4 CREAM TOPICAL at 10:08

## 2020-01-25 RX ADMIN — LIDOCAINE 1 PATCH: 4 CREAM TOPICAL at 19:11

## 2020-01-25 RX ADMIN — PANTOPRAZOLE SODIUM 40 MILLIGRAM(S): 20 TABLET, DELAYED RELEASE ORAL at 07:13

## 2020-01-25 RX ADMIN — LIDOCAINE 1 PATCH: 4 CREAM TOPICAL at 23:52

## 2020-01-25 RX ADMIN — Medication 10 MILLIGRAM(S): at 07:13

## 2020-01-25 RX ADMIN — OLANZAPINE 10 MILLIGRAM(S): 15 TABLET, FILM COATED ORAL at 21:47

## 2020-01-25 RX ADMIN — Medication 20 MILLIGRAM(S): at 10:09

## 2020-01-25 RX ADMIN — AMLODIPINE BESYLATE 2.5 MILLIGRAM(S): 2.5 TABLET ORAL at 07:13

## 2020-01-25 RX ADMIN — NYSTATIN CREAM 1 APPLICATION(S): 100000 CREAM TOPICAL at 10:09

## 2020-01-26 RX ADMIN — Medication 3 MILLIGRAM(S): at 20:49

## 2020-01-26 RX ADMIN — SERTRALINE 100 MILLIGRAM(S): 25 TABLET, FILM COATED ORAL at 08:45

## 2020-01-26 RX ADMIN — ATORVASTATIN CALCIUM 40 MILLIGRAM(S): 80 TABLET, FILM COATED ORAL at 20:50

## 2020-01-26 RX ADMIN — LOSARTAN POTASSIUM 100 MILLIGRAM(S): 100 TABLET, FILM COATED ORAL at 07:18

## 2020-01-26 RX ADMIN — NYSTATIN CREAM 1 APPLICATION(S): 100000 CREAM TOPICAL at 20:50

## 2020-01-26 RX ADMIN — NYSTATIN CREAM 1 APPLICATION(S): 100000 CREAM TOPICAL at 08:45

## 2020-01-26 RX ADMIN — PREGABALIN 1000 MICROGRAM(S): 225 CAPSULE ORAL at 08:45

## 2020-01-26 RX ADMIN — CLOPIDOGREL BISULFATE 75 MILLIGRAM(S): 75 TABLET, FILM COATED ORAL at 08:45

## 2020-01-26 RX ADMIN — TIOTROPIUM BROMIDE 1 CAPSULE(S): 18 CAPSULE ORAL; RESPIRATORY (INHALATION) at 08:45

## 2020-01-26 RX ADMIN — AMLODIPINE BESYLATE 2.5 MILLIGRAM(S): 2.5 TABLET ORAL at 07:18

## 2020-01-26 RX ADMIN — Medication 81 MILLIGRAM(S): at 08:45

## 2020-01-26 RX ADMIN — MONTELUKAST 10 MILLIGRAM(S): 4 TABLET, CHEWABLE ORAL at 08:45

## 2020-01-26 RX ADMIN — Medication 5 MILLIGRAM(S): at 12:28

## 2020-01-26 RX ADMIN — Medication 5 MILLIGRAM(S): at 08:45

## 2020-01-26 RX ADMIN — OLANZAPINE 10 MILLIGRAM(S): 15 TABLET, FILM COATED ORAL at 20:50

## 2020-01-26 RX ADMIN — Medication 5 MILLIGRAM(S): at 20:50

## 2020-01-26 RX ADMIN — LIDOCAINE 1 PATCH: 4 CREAM TOPICAL at 08:45

## 2020-01-26 RX ADMIN — Medication 125 MICROGRAM(S): at 08:45

## 2020-01-26 RX ADMIN — Medication 20 MILLIGRAM(S): at 08:45

## 2020-01-26 RX ADMIN — Medication 10 MILLIGRAM(S): at 07:18

## 2020-01-26 RX ADMIN — OLANZAPINE 10 MILLIGRAM(S): 15 TABLET, FILM COATED ORAL at 08:45

## 2020-01-26 RX ADMIN — LIDOCAINE 1 PATCH: 4 CREAM TOPICAL at 18:46

## 2020-01-26 RX ADMIN — LIDOCAINE 1 PATCH: 4 CREAM TOPICAL at 21:31

## 2020-01-26 RX ADMIN — Medication 2000 UNIT(S): at 08:45

## 2020-01-27 PROCEDURE — 90870 ELECTROCONVULSIVE THERAPY: CPT

## 2020-01-27 PROCEDURE — 99232 SBSQ HOSP IP/OBS MODERATE 35: CPT | Mod: 25

## 2020-01-27 RX ORDER — SERTRALINE 25 MG/1
150 TABLET, FILM COATED ORAL DAILY
Refills: 0 | Status: DISCONTINUED | OUTPATIENT
Start: 2020-01-28 | End: 2020-01-29

## 2020-01-27 RX ORDER — MIDAZOLAM HYDROCHLORIDE 1 MG/ML
1 INJECTION, SOLUTION INTRAMUSCULAR; INTRAVENOUS ONCE
Refills: 0 | Status: DISCONTINUED | OUTPATIENT
Start: 2020-01-27 | End: 2020-01-27

## 2020-01-27 RX ORDER — FLUMAZENIL 0.1 MG/ML
0.2 VIAL (ML) INTRAVENOUS ONCE
Refills: 0 | Status: COMPLETED | OUTPATIENT
Start: 2020-01-27 | End: 2020-01-27

## 2020-01-27 RX ADMIN — Medication 5 MILLIGRAM(S): at 13:43

## 2020-01-27 RX ADMIN — OLANZAPINE 10 MILLIGRAM(S): 15 TABLET, FILM COATED ORAL at 13:44

## 2020-01-27 RX ADMIN — Medication 3 MILLIGRAM(S): at 21:01

## 2020-01-27 RX ADMIN — MONTELUKAST 10 MILLIGRAM(S): 4 TABLET, CHEWABLE ORAL at 13:44

## 2020-01-27 RX ADMIN — SERTRALINE 100 MILLIGRAM(S): 25 TABLET, FILM COATED ORAL at 13:44

## 2020-01-27 RX ADMIN — Medication 81 MILLIGRAM(S): at 13:43

## 2020-01-27 RX ADMIN — Medication 125 MICROGRAM(S): at 13:44

## 2020-01-27 RX ADMIN — OLANZAPINE 10 MILLIGRAM(S): 15 TABLET, FILM COATED ORAL at 21:00

## 2020-01-27 RX ADMIN — PREGABALIN 1000 MICROGRAM(S): 225 CAPSULE ORAL at 13:44

## 2020-01-27 RX ADMIN — Medication 10 MILLIGRAM(S): at 06:59

## 2020-01-27 RX ADMIN — ATORVASTATIN CALCIUM 40 MILLIGRAM(S): 80 TABLET, FILM COATED ORAL at 21:00

## 2020-01-27 RX ADMIN — LIDOCAINE 1 PATCH: 4 CREAM TOPICAL at 19:33

## 2020-01-27 RX ADMIN — LIDOCAINE 1 PATCH: 4 CREAM TOPICAL at 13:44

## 2020-01-27 RX ADMIN — Medication 20 MILLIGRAM(S): at 13:44

## 2020-01-27 RX ADMIN — MIDAZOLAM HYDROCHLORIDE 1 MILLIGRAM(S): 1 INJECTION, SOLUTION INTRAMUSCULAR; INTRAVENOUS at 10:28

## 2020-01-27 RX ADMIN — NYSTATIN CREAM 1 APPLICATION(S): 100000 CREAM TOPICAL at 21:01

## 2020-01-27 RX ADMIN — Medication 5 MILLIGRAM(S): at 21:00

## 2020-01-27 RX ADMIN — CLOPIDOGREL BISULFATE 75 MILLIGRAM(S): 75 TABLET, FILM COATED ORAL at 13:44

## 2020-01-27 RX ADMIN — LOSARTAN POTASSIUM 100 MILLIGRAM(S): 100 TABLET, FILM COATED ORAL at 06:59

## 2020-01-27 RX ADMIN — TIOTROPIUM BROMIDE 1 CAPSULE(S): 18 CAPSULE ORAL; RESPIRATORY (INHALATION) at 13:44

## 2020-01-27 RX ADMIN — Medication 0.2 MILLIGRAM(S): at 10:25

## 2020-01-27 RX ADMIN — Medication 2000 UNIT(S): at 13:44

## 2020-01-27 RX ADMIN — AMLODIPINE BESYLATE 2.5 MILLIGRAM(S): 2.5 TABLET ORAL at 06:59

## 2020-01-28 PROCEDURE — 99232 SBSQ HOSP IP/OBS MODERATE 35: CPT

## 2020-01-28 RX ADMIN — OLANZAPINE 10 MILLIGRAM(S): 15 TABLET, FILM COATED ORAL at 21:30

## 2020-01-28 RX ADMIN — ATORVASTATIN CALCIUM 40 MILLIGRAM(S): 80 TABLET, FILM COATED ORAL at 21:30

## 2020-01-28 RX ADMIN — Medication 5 MILLIGRAM(S): at 12:20

## 2020-01-28 RX ADMIN — LIDOCAINE 1 PATCH: 4 CREAM TOPICAL at 10:00

## 2020-01-28 RX ADMIN — Medication 5 MILLIGRAM(S): at 21:30

## 2020-01-28 RX ADMIN — LOSARTAN POTASSIUM 100 MILLIGRAM(S): 100 TABLET, FILM COATED ORAL at 06:52

## 2020-01-28 RX ADMIN — LIDOCAINE 1 PATCH: 4 CREAM TOPICAL at 23:34

## 2020-01-28 RX ADMIN — Medication 2000 UNIT(S): at 10:00

## 2020-01-28 RX ADMIN — PREGABALIN 1000 MICROGRAM(S): 225 CAPSULE ORAL at 10:00

## 2020-01-28 RX ADMIN — SERTRALINE 150 MILLIGRAM(S): 25 TABLET, FILM COATED ORAL at 10:01

## 2020-01-28 RX ADMIN — Medication 10 MILLIGRAM(S): at 06:52

## 2020-01-28 RX ADMIN — CLOPIDOGREL BISULFATE 75 MILLIGRAM(S): 75 TABLET, FILM COATED ORAL at 10:00

## 2020-01-28 RX ADMIN — Medication 81 MILLIGRAM(S): at 10:00

## 2020-01-28 RX ADMIN — NYSTATIN CREAM 1 APPLICATION(S): 100000 CREAM TOPICAL at 21:30

## 2020-01-28 RX ADMIN — AMLODIPINE BESYLATE 2.5 MILLIGRAM(S): 2.5 TABLET ORAL at 06:52

## 2020-01-28 RX ADMIN — Medication 125 MICROGRAM(S): at 10:00

## 2020-01-28 RX ADMIN — MONTELUKAST 10 MILLIGRAM(S): 4 TABLET, CHEWABLE ORAL at 10:01

## 2020-01-28 RX ADMIN — PANTOPRAZOLE SODIUM 40 MILLIGRAM(S): 20 TABLET, DELAYED RELEASE ORAL at 06:52

## 2020-01-28 RX ADMIN — Medication 5 MILLIGRAM(S): at 10:00

## 2020-01-28 RX ADMIN — Medication 20 MILLIGRAM(S): at 10:00

## 2020-01-28 RX ADMIN — OLANZAPINE 10 MILLIGRAM(S): 15 TABLET, FILM COATED ORAL at 10:01

## 2020-01-28 RX ADMIN — TIOTROPIUM BROMIDE 1 CAPSULE(S): 18 CAPSULE ORAL; RESPIRATORY (INHALATION) at 10:01

## 2020-01-28 RX ADMIN — Medication 3 MILLIGRAM(S): at 21:30

## 2020-01-28 RX ADMIN — LIDOCAINE 1 PATCH: 4 CREAM TOPICAL at 01:36

## 2020-01-29 PROCEDURE — 99232 SBSQ HOSP IP/OBS MODERATE 35: CPT | Mod: GC,25

## 2020-01-29 PROCEDURE — 90870 ELECTROCONVULSIVE THERAPY: CPT

## 2020-01-29 RX ORDER — OXYCODONE HYDROCHLORIDE 5 MG/1
1 TABLET ORAL
Qty: 0 | Refills: 0 | DISCHARGE

## 2020-01-29 RX ORDER — OLANZAPINE 15 MG/1
0.5 TABLET, FILM COATED ORAL
Qty: 30 | Refills: 0
Start: 2020-01-29 | End: 2020-02-27

## 2020-01-29 RX ORDER — AMLODIPINE BESYLATE 2.5 MG/1
1 TABLET ORAL
Qty: 30 | Refills: 0
Start: 2020-01-29 | End: 2020-02-27

## 2020-01-29 RX ORDER — TRIHEXYPHENIDYL HCL 2 MG
0 TABLET ORAL
Qty: 60 | Refills: 0 | DISCHARGE

## 2020-01-29 RX ORDER — LEVOTHYROXINE SODIUM 125 MCG
1 TABLET ORAL
Qty: 30 | Refills: 0
Start: 2020-01-29 | End: 2020-02-27

## 2020-01-29 RX ORDER — SERTRALINE 25 MG/1
2 TABLET, FILM COATED ORAL
Qty: 60 | Refills: 0
Start: 2020-01-29 | End: 2020-02-27

## 2020-01-29 RX ORDER — DULOXETINE HYDROCHLORIDE 30 MG/1
1 CAPSULE, DELAYED RELEASE ORAL
Qty: 0 | Refills: 0 | DISCHARGE

## 2020-01-29 RX ORDER — CHOLECALCIFEROL (VITAMIN D3) 125 MCG
2000 CAPSULE ORAL
Qty: 30 | Refills: 0
Start: 2020-01-29 | End: 2020-02-27

## 2020-01-29 RX ORDER — FUROSEMIDE 40 MG
1 TABLET ORAL
Qty: 30 | Refills: 0
Start: 2020-01-29 | End: 2020-02-27

## 2020-01-29 RX ORDER — MIDAZOLAM HYDROCHLORIDE 1 MG/ML
1 INJECTION, SOLUTION INTRAMUSCULAR; INTRAVENOUS ONCE
Refills: 0 | Status: DISCONTINUED | OUTPATIENT
Start: 2020-01-29 | End: 2020-01-29

## 2020-01-29 RX ORDER — LOSARTAN POTASSIUM 100 MG/1
1 TABLET, FILM COATED ORAL
Qty: 30 | Refills: 0
Start: 2020-01-29 | End: 2020-02-27

## 2020-01-29 RX ORDER — MELOXICAM 15 MG/1
1 TABLET ORAL
Qty: 0 | Refills: 0 | DISCHARGE

## 2020-01-29 RX ORDER — TRIHEXYPHENIDYL HCL 2 MG
1 TABLET ORAL
Qty: 0 | Refills: 0 | DISCHARGE

## 2020-01-29 RX ORDER — ALBUTEROL 90 UG/1
2.5 AEROSOL, METERED ORAL
Qty: 0 | Refills: 0 | DISCHARGE

## 2020-01-29 RX ORDER — LIDOCAINE 4 G/100G
1 CREAM TOPICAL
Qty: 0 | Refills: 0 | DISCHARGE
Start: 2020-01-29

## 2020-01-29 RX ORDER — FLUTICASONE FUROATE AND VILANTEROL TRIFENATATE 100; 25 UG/1; UG/1
1 POWDER RESPIRATORY (INHALATION)
Qty: 0 | Refills: 0 | DISCHARGE

## 2020-01-29 RX ORDER — PREGABALIN 225 MG/1
1 CAPSULE ORAL
Qty: 30 | Refills: 0
Start: 2020-01-29 | End: 2020-02-27

## 2020-01-29 RX ORDER — GABAPENTIN 400 MG/1
1 CAPSULE ORAL
Qty: 0 | Refills: 0 | DISCHARGE

## 2020-01-29 RX ORDER — ALBUTEROL 90 UG/1
0 AEROSOL, METERED ORAL
Qty: 18 | Refills: 0 | DISCHARGE

## 2020-01-29 RX ORDER — MONTELUKAST 4 MG/1
1 TABLET, CHEWABLE ORAL
Qty: 30 | Refills: 0
Start: 2020-01-29 | End: 2020-02-27

## 2020-01-29 RX ORDER — AMLODIPINE BESYLATE 2.5 MG/1
1 TABLET ORAL
Qty: 30 | Refills: 0

## 2020-01-29 RX ORDER — PANTOPRAZOLE SODIUM 20 MG/1
1 TABLET, DELAYED RELEASE ORAL
Qty: 30 | Refills: 0
Start: 2020-01-29 | End: 2020-02-27

## 2020-01-29 RX ORDER — UMECLIDINIUM 62.5 UG/1
1 AEROSOL, POWDER ORAL
Qty: 0 | Refills: 0 | DISCHARGE

## 2020-01-29 RX ORDER — CLOPIDOGREL BISULFATE 75 MG/1
1 TABLET, FILM COATED ORAL
Qty: 30 | Refills: 0
Start: 2020-01-29 | End: 2020-02-27

## 2020-01-29 RX ORDER — LOSARTAN POTASSIUM 100 MG/1
0 TABLET, FILM COATED ORAL
Qty: 90 | Refills: 0 | DISCHARGE

## 2020-01-29 RX ORDER — IPRATROPIUM/ALBUTEROL SULFATE 18-103MCG
3 AEROSOL WITH ADAPTER (GRAM) INHALATION
Qty: 0 | Refills: 0 | DISCHARGE
Start: 2020-01-29

## 2020-01-29 RX ORDER — SPIRONOLACTONE 25 MG/1
1 TABLET, FILM COATED ORAL
Qty: 0 | Refills: 0 | DISCHARGE

## 2020-01-29 RX ORDER — ATORVASTATIN CALCIUM 80 MG/1
0 TABLET, FILM COATED ORAL
Qty: 90 | Refills: 0 | DISCHARGE

## 2020-01-29 RX ORDER — OLMESARTAN MEDOXOMIL 5 MG/1
1 TABLET, FILM COATED ORAL
Qty: 0 | Refills: 0 | DISCHARGE

## 2020-01-29 RX ORDER — AMLODIPINE BESYLATE 2.5 MG/1
1 TABLET ORAL
Qty: 0 | Refills: 0 | DISCHARGE

## 2020-01-29 RX ORDER — ATORVASTATIN CALCIUM 80 MG/1
1 TABLET, FILM COATED ORAL
Qty: 30 | Refills: 0
Start: 2020-01-29 | End: 2020-02-27

## 2020-01-29 RX ORDER — SERTRALINE 25 MG/1
200 TABLET, FILM COATED ORAL DAILY
Refills: 0 | Status: DISCONTINUED | OUTPATIENT
Start: 2020-01-30 | End: 2020-01-30

## 2020-01-29 RX ORDER — ASPIRIN/CALCIUM CARB/MAGNESIUM 324 MG
1 TABLET ORAL
Qty: 30 | Refills: 0
Start: 2020-01-29 | End: 2020-02-27

## 2020-01-29 RX ORDER — MONTELUKAST 4 MG/1
0 TABLET, CHEWABLE ORAL
Qty: 30 | Refills: 0 | DISCHARGE

## 2020-01-29 RX ORDER — GABAPENTIN 400 MG/1
0 CAPSULE ORAL
Qty: 270 | Refills: 0 | DISCHARGE

## 2020-01-29 RX ORDER — CITALOPRAM 10 MG/1
0 TABLET, FILM COATED ORAL
Qty: 15 | Refills: 0 | DISCHARGE

## 2020-01-29 RX ORDER — MIRTAZAPINE 45 MG/1
1 TABLET, ORALLY DISINTEGRATING ORAL
Qty: 0 | Refills: 0 | DISCHARGE

## 2020-01-29 RX ORDER — CARVEDILOL PHOSPHATE 80 MG/1
1 CAPSULE, EXTENDED RELEASE ORAL
Qty: 0 | Refills: 0 | DISCHARGE

## 2020-01-29 RX ORDER — BACLOFEN 100 %
1 POWDER (GRAM) MISCELLANEOUS
Qty: 90 | Refills: 0
Start: 2020-01-29 | End: 2020-02-27

## 2020-01-29 RX ORDER — FLUMAZENIL 0.1 MG/ML
0.2 VIAL (ML) INTRAVENOUS ONCE
Refills: 0 | Status: COMPLETED | OUTPATIENT
Start: 2020-01-29 | End: 2020-01-29

## 2020-01-29 RX ORDER — LANOLIN ALCOHOL/MO/W.PET/CERES
1 CREAM (GRAM) TOPICAL
Qty: 30 | Refills: 0
Start: 2020-01-29 | End: 2020-02-27

## 2020-01-29 RX ORDER — TIOTROPIUM BROMIDE 18 UG/1
1 CAPSULE ORAL; RESPIRATORY (INHALATION)
Qty: 30 | Refills: 0
Start: 2020-01-29 | End: 2020-02-27

## 2020-01-29 RX ADMIN — SERTRALINE 150 MILLIGRAM(S): 25 TABLET, FILM COATED ORAL at 11:21

## 2020-01-29 RX ADMIN — LOSARTAN POTASSIUM 100 MILLIGRAM(S): 100 TABLET, FILM COATED ORAL at 07:16

## 2020-01-29 RX ADMIN — OLANZAPINE 10 MILLIGRAM(S): 15 TABLET, FILM COATED ORAL at 21:18

## 2020-01-29 RX ADMIN — Medication 125 MICROGRAM(S): at 11:20

## 2020-01-29 RX ADMIN — LIDOCAINE 1 PATCH: 4 CREAM TOPICAL at 11:20

## 2020-01-29 RX ADMIN — Medication 3 MILLIGRAM(S): at 21:18

## 2020-01-29 RX ADMIN — AMLODIPINE BESYLATE 2.5 MILLIGRAM(S): 2.5 TABLET ORAL at 07:16

## 2020-01-29 RX ADMIN — Medication 5 MILLIGRAM(S): at 12:48

## 2020-01-29 RX ADMIN — CLOPIDOGREL BISULFATE 75 MILLIGRAM(S): 75 TABLET, FILM COATED ORAL at 11:20

## 2020-01-29 RX ADMIN — Medication 5 MILLIGRAM(S): at 11:18

## 2020-01-29 RX ADMIN — NYSTATIN CREAM 1 APPLICATION(S): 100000 CREAM TOPICAL at 21:18

## 2020-01-29 RX ADMIN — Medication 5 MILLIGRAM(S): at 21:18

## 2020-01-29 RX ADMIN — Medication 0.2 MILLIGRAM(S): at 09:10

## 2020-01-29 RX ADMIN — LIDOCAINE 1 PATCH: 4 CREAM TOPICAL at 23:10

## 2020-01-29 RX ADMIN — MONTELUKAST 10 MILLIGRAM(S): 4 TABLET, CHEWABLE ORAL at 11:20

## 2020-01-29 RX ADMIN — ATORVASTATIN CALCIUM 40 MILLIGRAM(S): 80 TABLET, FILM COATED ORAL at 21:18

## 2020-01-29 RX ADMIN — PREGABALIN 1000 MICROGRAM(S): 225 CAPSULE ORAL at 11:20

## 2020-01-29 RX ADMIN — Medication 81 MILLIGRAM(S): at 11:18

## 2020-01-29 RX ADMIN — Medication 2000 UNIT(S): at 11:20

## 2020-01-29 RX ADMIN — PANTOPRAZOLE SODIUM 40 MILLIGRAM(S): 20 TABLET, DELAYED RELEASE ORAL at 07:16

## 2020-01-29 RX ADMIN — LIDOCAINE 1 PATCH: 4 CREAM TOPICAL at 18:53

## 2020-01-29 RX ADMIN — MIDAZOLAM HYDROCHLORIDE 1 MILLIGRAM(S): 1 INJECTION, SOLUTION INTRAMUSCULAR; INTRAVENOUS at 09:16

## 2020-01-29 RX ADMIN — Medication 20 MILLIGRAM(S): at 11:20

## 2020-01-29 RX ADMIN — Medication 10 MILLIGRAM(S): at 07:16

## 2020-01-29 RX ADMIN — TIOTROPIUM BROMIDE 1 CAPSULE(S): 18 CAPSULE ORAL; RESPIRATORY (INHALATION) at 11:21

## 2020-01-29 RX ADMIN — NYSTATIN CREAM 1 APPLICATION(S): 100000 CREAM TOPICAL at 11:20

## 2020-01-29 RX ADMIN — OLANZAPINE 10 MILLIGRAM(S): 15 TABLET, FILM COATED ORAL at 11:20

## 2020-01-30 VITALS — RESPIRATION RATE: 18 BRPM | TEMPERATURE: 98 F

## 2020-01-30 PROCEDURE — 99238 HOSP IP/OBS DSCHRG MGMT 30/<: CPT

## 2020-01-30 RX ADMIN — Medication 20 MILLIGRAM(S): at 08:47

## 2020-01-30 RX ADMIN — LIDOCAINE 1 PATCH: 4 CREAM TOPICAL at 08:47

## 2020-01-30 RX ADMIN — PREGABALIN 1000 MICROGRAM(S): 225 CAPSULE ORAL at 08:47

## 2020-01-30 RX ADMIN — Medication 2000 UNIT(S): at 08:47

## 2020-01-30 RX ADMIN — MONTELUKAST 10 MILLIGRAM(S): 4 TABLET, CHEWABLE ORAL at 08:48

## 2020-01-30 RX ADMIN — SERTRALINE 200 MILLIGRAM(S): 25 TABLET, FILM COATED ORAL at 08:48

## 2020-01-30 RX ADMIN — NYSTATIN CREAM 1 APPLICATION(S): 100000 CREAM TOPICAL at 10:08

## 2020-01-30 RX ADMIN — OLANZAPINE 10 MILLIGRAM(S): 15 TABLET, FILM COATED ORAL at 08:48

## 2020-01-30 RX ADMIN — AMLODIPINE BESYLATE 2.5 MILLIGRAM(S): 2.5 TABLET ORAL at 07:15

## 2020-01-30 RX ADMIN — CLOPIDOGREL BISULFATE 75 MILLIGRAM(S): 75 TABLET, FILM COATED ORAL at 08:47

## 2020-01-30 RX ADMIN — Medication 5 MILLIGRAM(S): at 08:47

## 2020-01-30 RX ADMIN — LOSARTAN POTASSIUM 100 MILLIGRAM(S): 100 TABLET, FILM COATED ORAL at 07:15

## 2020-01-30 RX ADMIN — TIOTROPIUM BROMIDE 1 CAPSULE(S): 18 CAPSULE ORAL; RESPIRATORY (INHALATION) at 09:00

## 2020-01-30 RX ADMIN — Medication 81 MILLIGRAM(S): at 08:47

## 2020-01-30 RX ADMIN — PANTOPRAZOLE SODIUM 40 MILLIGRAM(S): 20 TABLET, DELAYED RELEASE ORAL at 07:15

## 2020-01-30 RX ADMIN — Medication 10 MILLIGRAM(S): at 07:15

## 2020-01-30 RX ADMIN — Medication 125 MICROGRAM(S): at 08:47

## 2020-02-04 ENCOUNTER — OUTPATIENT (OUTPATIENT)
Dept: OUTPATIENT SERVICES | Facility: HOSPITAL | Age: 70
LOS: 1 days | Discharge: HOME | End: 2020-02-04
Payer: MEDICARE

## 2020-02-04 DIAGNOSIS — Z90.5 ACQUIRED ABSENCE OF KIDNEY: Chronic | ICD-10-CM

## 2020-02-04 DIAGNOSIS — Z98.89 OTHER SPECIFIED POSTPROCEDURAL STATES: Chronic | ICD-10-CM

## 2020-02-04 DIAGNOSIS — Z90.49 ACQUIRED ABSENCE OF OTHER SPECIFIED PARTS OF DIGESTIVE TRACT: Chronic | ICD-10-CM

## 2020-02-05 PROCEDURE — 90870 ELECTROCONVULSIVE THERAPY: CPT

## 2020-02-05 RX ORDER — MIDAZOLAM HYDROCHLORIDE 1 MG/ML
1 INJECTION, SOLUTION INTRAMUSCULAR; INTRAVENOUS ONCE
Refills: 0 | Status: DISCONTINUED | OUTPATIENT
Start: 2020-02-05 | End: 2020-02-05

## 2020-02-05 RX ORDER — FLUMAZENIL 0.1 MG/ML
0.2 VIAL (ML) INTRAVENOUS ONCE
Refills: 0 | Status: COMPLETED | OUTPATIENT
Start: 2020-02-05 | End: 2020-02-05

## 2020-02-05 RX ADMIN — Medication 0.2 MILLIGRAM(S): at 10:56

## 2020-02-05 RX ADMIN — MIDAZOLAM HYDROCHLORIDE 1 MILLIGRAM(S): 1 INJECTION, SOLUTION INTRAMUSCULAR; INTRAVENOUS at 11:00

## 2020-02-10 NOTE — PATIENT PROFILE ADULT - FALLEN IN THE PAST
Patient denied PCP notification of admission to [facility] in [place] Date: 2/10/2020 Time: 0540        yes

## 2020-02-11 ENCOUNTER — OUTPATIENT (OUTPATIENT)
Dept: OUTPATIENT SERVICES | Facility: HOSPITAL | Age: 70
LOS: 1 days | Discharge: HOME | End: 2020-02-11
Payer: MEDICARE

## 2020-02-11 DIAGNOSIS — Z90.49 ACQUIRED ABSENCE OF OTHER SPECIFIED PARTS OF DIGESTIVE TRACT: Chronic | ICD-10-CM

## 2020-02-11 DIAGNOSIS — Z90.5 ACQUIRED ABSENCE OF KIDNEY: Chronic | ICD-10-CM

## 2020-02-11 DIAGNOSIS — Z98.89 OTHER SPECIFIED POSTPROCEDURAL STATES: Chronic | ICD-10-CM

## 2020-02-11 PROCEDURE — 90792 PSYCH DIAG EVAL W/MED SRVCS: CPT

## 2020-02-12 DIAGNOSIS — F33.3 MAJOR DEPRESSIVE DISORDER, RECURRENT, SEVERE WITH PSYCHOTIC SYMPTOMS: ICD-10-CM

## 2020-02-12 PROCEDURE — 90870 ELECTROCONVULSIVE THERAPY: CPT

## 2020-02-12 RX ORDER — FLUMAZENIL 0.1 MG/ML
0.2 VIAL (ML) INTRAVENOUS ONCE
Refills: 0 | Status: COMPLETED | OUTPATIENT
Start: 2020-02-12 | End: 2020-02-12

## 2020-02-12 RX ORDER — MIDAZOLAM HYDROCHLORIDE 1 MG/ML
1 INJECTION, SOLUTION INTRAMUSCULAR; INTRAVENOUS ONCE
Refills: 0 | Status: DISCONTINUED | OUTPATIENT
Start: 2020-02-12 | End: 2020-02-12

## 2020-02-12 RX ADMIN — Medication 0.2 MILLIGRAM(S): at 11:55

## 2020-02-12 RX ADMIN — MIDAZOLAM HYDROCHLORIDE 1 MILLIGRAM(S): 1 INJECTION, SOLUTION INTRAMUSCULAR; INTRAVENOUS at 11:58

## 2020-02-20 PROCEDURE — 90870 ELECTROCONVULSIVE THERAPY: CPT

## 2020-02-20 RX ORDER — FLUMAZENIL 0.1 MG/ML
0.2 VIAL (ML) INTRAVENOUS ONCE
Refills: 0 | Status: COMPLETED | OUTPATIENT
Start: 2020-02-20 | End: 2020-02-20

## 2020-02-20 RX ORDER — MIDAZOLAM HYDROCHLORIDE 1 MG/ML
1 INJECTION, SOLUTION INTRAMUSCULAR; INTRAVENOUS ONCE
Refills: 0 | Status: DISCONTINUED | OUTPATIENT
Start: 2020-02-20 | End: 2020-02-20

## 2020-02-20 RX ADMIN — Medication 0.2 MILLIGRAM(S): at 11:50

## 2020-02-20 RX ADMIN — MIDAZOLAM HYDROCHLORIDE 1 MILLIGRAM(S): 1 INJECTION, SOLUTION INTRAMUSCULAR; INTRAVENOUS at 11:54

## 2020-02-27 PROCEDURE — 90870 ELECTROCONVULSIVE THERAPY: CPT

## 2020-02-27 RX ORDER — FLUMAZENIL 0.1 MG/ML
0.2 VIAL (ML) INTRAVENOUS ONCE
Refills: 0 | Status: COMPLETED | OUTPATIENT
Start: 2020-02-27 | End: 2020-02-27

## 2020-02-27 RX ORDER — MIDAZOLAM HYDROCHLORIDE 1 MG/ML
1 INJECTION, SOLUTION INTRAMUSCULAR; INTRAVENOUS ONCE
Refills: 0 | Status: DISCONTINUED | OUTPATIENT
Start: 2020-02-27 | End: 2020-02-27

## 2020-02-27 RX ADMIN — Medication 0.2 MILLIGRAM(S): at 11:26

## 2020-02-27 RX ADMIN — MIDAZOLAM HYDROCHLORIDE 1 MILLIGRAM(S): 1 INJECTION, SOLUTION INTRAMUSCULAR; INTRAVENOUS at 11:30

## 2020-03-03 PROCEDURE — 99213 OFFICE O/P EST LOW 20 MIN: CPT

## 2020-04-07 PROCEDURE — 99213 OFFICE O/P EST LOW 20 MIN: CPT

## 2020-04-09 NOTE — ED PROVIDER NOTE - EKG ADDITIONAL QUESTION - PERFORMED INDEPENDENT VISUALIZATION
[address]  [address]  [phone] phone     [phone] fax  Hours of Operation: Monday- Friday 8:00am- 5:00pm  After hours phone  [phone]  Hematology / Oncology Physicians on call      Dr. Cipriano Johns, VERN Boykin, VERN Pastor, VERN Das NP    Please call with any concerns regarding your appointment today.[address]  [phone] phone     [phone] fax  Hours of Operation: Monday- Friday 8:00am- 5:00pm  After hours phone  [phone]  Hematology / Oncology Physicians on call      VERN Bell Dr., Dr., Dr., Dr., NP Sydney Prescott, VERN Das NP    Please call with any concerns regarding your appointment today.  
Yes

## 2020-06-12 PROCEDURE — 99442: CPT | Mod: 95

## 2020-06-16 NOTE — ED ADULT TRIAGE NOTE - ACCOMPANIED BY
EMT/paramedic Erythromycin Counseling:  I discussed with the patient the risks of erythromycin including but not limited to GI upset, allergic reaction, drug rash, diarrhea, increase in liver enzymes, and yeast infections.

## 2020-07-10 PROCEDURE — 99442: CPT | Mod: 95

## 2020-07-20 ENCOUNTER — APPOINTMENT (OUTPATIENT)
Dept: GASTROENTEROLOGY | Facility: CLINIC | Age: 70
End: 2020-07-20

## 2020-08-14 PROCEDURE — 99442: CPT | Mod: 95

## 2020-08-17 ENCOUNTER — APPOINTMENT (OUTPATIENT)
Dept: GASTROENTEROLOGY | Facility: CLINIC | Age: 70
End: 2020-08-17
Payer: MEDICARE

## 2020-08-17 VITALS
HEIGHT: 60 IN | HEART RATE: 79 BPM | SYSTOLIC BLOOD PRESSURE: 119 MMHG | TEMPERATURE: 98.2 F | OXYGEN SATURATION: 95 % | DIASTOLIC BLOOD PRESSURE: 66 MMHG

## 2020-08-17 DIAGNOSIS — Z85.3 PERSONAL HISTORY OF MALIGNANT NEOPLASM OF BREAST: ICD-10-CM

## 2020-08-17 DIAGNOSIS — Z80.0 FAMILY HISTORY OF MALIGNANT NEOPLASM OF DIGESTIVE ORGANS: ICD-10-CM

## 2020-08-17 DIAGNOSIS — Z87.891 PERSONAL HISTORY OF NICOTINE DEPENDENCE: ICD-10-CM

## 2020-08-17 PROCEDURE — 99204 OFFICE O/P NEW MOD 45 MIN: CPT

## 2020-08-17 RX ORDER — ALBUTEROL SULFATE 90 UG/1
108 (90 BASE) AEROSOL, METERED RESPIRATORY (INHALATION)
Refills: 0 | Status: DISCONTINUED | COMMUNITY
Start: 2018-06-01 | End: 2020-08-17

## 2020-08-17 RX ORDER — GLUCOSAMINE HCL/CHONDROITIN SU 500-400 MG
3 CAPSULE ORAL
Qty: 90 | Refills: 0 | Status: ACTIVE | COMMUNITY
Start: 2020-06-23

## 2020-08-17 RX ORDER — AMLODIPINE BESYLATE 5 MG/1
5 TABLET ORAL
Qty: 30 | Refills: 0 | Status: ACTIVE | COMMUNITY
Start: 2020-07-30

## 2020-08-17 RX ORDER — TRIHEXYPHENIDYL HYDROCHLORIDE 2 MG/1
2 TABLET ORAL
Qty: 120 | Refills: 0 | Status: DISCONTINUED | COMMUNITY
Start: 2018-04-07 | End: 2020-08-17

## 2020-08-17 RX ORDER — PANTOPRAZOLE 40 MG/1
40 TABLET, DELAYED RELEASE ORAL
Qty: 90 | Refills: 0 | Status: DISCONTINUED | COMMUNITY
Start: 2020-02-24 | End: 2020-08-17

## 2020-08-17 RX ORDER — CARVEDILOL 12.5 MG/1
12.5 TABLET, FILM COATED ORAL
Qty: 60 | Refills: 0 | Status: DISCONTINUED | COMMUNITY
Start: 2018-04-04 | End: 2020-08-17

## 2020-08-17 RX ORDER — HYDROCHLOROTHIAZIDE 12.5 MG/1
12.5 TABLET ORAL
Qty: 30 | Refills: 0 | Status: DISCONTINUED | COMMUNITY
Start: 2020-04-27 | End: 2020-08-17

## 2020-08-17 RX ORDER — TORSEMIDE 10 MG/1
10 TABLET ORAL
Qty: 60 | Refills: 0 | Status: DISCONTINUED | COMMUNITY
Start: 2018-04-22 | End: 2020-08-17

## 2020-08-17 RX ORDER — MONTELUKAST 10 MG/1
10 TABLET, FILM COATED ORAL
Qty: 30 | Refills: 0 | Status: ACTIVE | COMMUNITY
Start: 2020-02-24

## 2020-08-17 RX ORDER — AMOXICILLIN 500 MG/1
500 CAPSULE ORAL
Qty: 20 | Refills: 0 | Status: DISCONTINUED | COMMUNITY
Start: 2020-03-19 | End: 2020-08-17

## 2020-08-17 RX ORDER — SPIRONOLACTONE 25 MG/1
25 TABLET ORAL
Qty: 30 | Refills: 0 | Status: DISCONTINUED | COMMUNITY
Start: 2018-05-29 | End: 2020-08-17

## 2020-08-17 RX ORDER — TIOTROPIUM BROMIDE 18 UG/1
18 CAPSULE ORAL; RESPIRATORY (INHALATION)
Qty: 30 | Refills: 0 | Status: ACTIVE | COMMUNITY
Start: 2020-02-24

## 2020-08-17 RX ORDER — AMLODIPINE BESYLATE 2.5 MG/1
2.5 TABLET ORAL
Qty: 30 | Refills: 0 | Status: DISCONTINUED | COMMUNITY
Start: 2018-04-10 | End: 2020-08-17

## 2020-08-17 RX ORDER — ASPIRIN 81 MG/1
81 TABLET, COATED ORAL
Qty: 120 | Refills: 0 | Status: ACTIVE | COMMUNITY
Start: 2020-06-23

## 2020-08-17 RX ORDER — LOSARTAN POTASSIUM 100 MG/1
100 TABLET, FILM COATED ORAL
Qty: 90 | Refills: 0 | Status: ACTIVE | COMMUNITY
Start: 2020-06-26

## 2020-08-17 RX ORDER — FLUTICASONE FUROATE AND VILANTEROL TRIFENATATE 100; 25 UG/1; UG/1
100-25 POWDER RESPIRATORY (INHALATION) DAILY
Refills: 0 | Status: DISCONTINUED | COMMUNITY
Start: 2018-07-24 | End: 2020-08-17

## 2020-08-17 NOTE — HISTORY OF PRESENT ILLNESS
[de-identified] : 69 year old woman complains of 3 episodes of dysphagia  after eating  rice or spaghetti. She developed choking and difficulty breathing. Her most recent episode was 1 month ago. She had a similar problem several years ago that resoled spontaneously. She denies rectal bleeding, melena or hematemesis. She also complains of diarrhea when she is stressed. She is s/p laminectomy x 2 with resultant hemiplegia as a result of which she is wheelchair bound. She denies rectal bleeding, melena or hematemasis

## 2020-08-17 NOTE — REVIEW OF SYSTEMS
[As Noted in HPI] : as noted in HPI [Abdominal Pain] : no abdominal pain [Constipation] : no constipation [Vomiting] : no vomiting [Diarrhea] : diarrhea [Heartburn] : heartburn [Melena] : no melena [Negative] : Heme/Lymph

## 2020-08-17 NOTE — PHYSICAL EXAM
[General Appearance - Alert] : alert [Sclera] : the sclera and conjunctiva were normal [General Appearance - In No Acute Distress] : in no acute distress [Extraocular Movements] : extraocular movements were intact [PERRL With Normal Accommodation] : pupils were equal in size, round, and reactive to light [Outer Ear] : the ears and nose were normal in appearance [Neck Cervical Mass (___cm)] : no neck mass was observed [Neck Appearance] : the appearance of the neck was normal [Thyroid Nodule] : there were no palpable thyroid nodules [Jugular Venous Distention Increased] : there was no jugular-venous distention [Thyroid Diffuse Enlargement] : the thyroid was not enlarged [Auscultation Breath Sounds / Voice Sounds] : lungs were clear to auscultation bilaterally [Heart Sounds] : normal S1 and S2 [Heart Rate And Rhythm] : heart rate was normal and rhythm regular [Heart Sounds Pericardial Friction Rub] : no pericardial rub [Heart Sounds Gallop] : no gallops [Murmurs] : no murmurs [] : no hepato-splenomegaly [Abdomen Soft] : soft [Bowel Sounds] : normal bowel sounds [Abdomen Tenderness] : non-tender [Cervical Lymph Nodes Enlarged Posterior Bilaterally] : posterior cervical [Abdomen Mass (___ Cm)] : no abdominal mass palpated [Cervical Lymph Nodes Enlarged Anterior Bilaterally] : anterior cervical [Supraclavicular Lymph Nodes Enlarged Bilaterally] : supraclavicular [No Spinal Tenderness] : no spinal tenderness [No CVA Tenderness] : no ~M costovertebral angle tenderness [Sensation] : the sensory exam was normal to light touch and pinprick [No Focal Deficits] : no focal deficits [FreeTextEntry1] : wheelchair bound

## 2020-08-17 NOTE — ASSESSMENT
[FreeTextEntry1] : Dietary and lifestyle modification discussed with he patient. r/o esophageal motility disorder\par

## 2020-08-17 NOTE — CONSULT LETTER
[Dear  ___] : Dear  [unfilled], [Please see my note below.] : Please see my note below. [Consult Letter:] : I had the pleasure of evaluating your patient, [unfilled]. [Sincerely,] : Sincerely, [Consult Closing:] : Thank you very much for allowing me to participate in the care of this patient.  If you have any questions, please do not hesitate to contact me. [FreeTextEntry3] : Dane Salguero MD, FACG\par

## 2020-08-18 NOTE — PROGRESS NOTE BEHAVIORAL HEALTH - FUND OF KNOWLEDGE
Additional Notes: Present for years and waxes and wanes. Recheck at next FBSE. Detail Level: Zone Normal

## 2020-09-15 PROCEDURE — 99443: CPT | Mod: 95

## 2020-09-17 ENCOUNTER — APPOINTMENT (OUTPATIENT)
Dept: SPEECH THERAPY | Facility: HOSPITAL | Age: 70
End: 2020-09-17

## 2020-09-21 NOTE — ED ADULT NURSE NOTE - NS ED NOTE ABUSE RESPONSE YN
Health Maintenance Due   Topic     Hepatitis C Screening      Lipid Panel      HIV Screening      TETANUS VACCINE      Influenza Vaccine (1) Ok to get        Yes

## 2020-09-28 ENCOUNTER — APPOINTMENT (OUTPATIENT)
Dept: SPEECH THERAPY | Facility: HOSPITAL | Age: 70
End: 2020-09-28

## 2020-09-28 ENCOUNTER — APPOINTMENT (OUTPATIENT)
Dept: RADIOLOGY | Facility: HOSPITAL | Age: 70
End: 2020-09-28
Payer: MEDICARE

## 2020-09-28 ENCOUNTER — OUTPATIENT (OUTPATIENT)
Dept: OUTPATIENT SERVICES | Facility: HOSPITAL | Age: 70
LOS: 1 days | End: 2020-09-28

## 2020-09-28 ENCOUNTER — OUTPATIENT (OUTPATIENT)
Dept: OUTPATIENT SERVICES | Facility: HOSPITAL | Age: 70
LOS: 1 days | Discharge: HOME | End: 2020-09-28
Payer: MEDICARE

## 2020-09-28 DIAGNOSIS — Z98.89 OTHER SPECIFIED POSTPROCEDURAL STATES: Chronic | ICD-10-CM

## 2020-09-28 DIAGNOSIS — R13.10 DYSPHAGIA, UNSPECIFIED: ICD-10-CM

## 2020-09-28 DIAGNOSIS — Z90.49 ACQUIRED ABSENCE OF OTHER SPECIFIED PARTS OF DIGESTIVE TRACT: Chronic | ICD-10-CM

## 2020-09-28 DIAGNOSIS — Z90.5 ACQUIRED ABSENCE OF KIDNEY: Chronic | ICD-10-CM

## 2020-09-28 PROCEDURE — 74230 X-RAY XM SWLNG FUNCJ C+: CPT | Mod: 26

## 2020-09-28 NOTE — ASSESSMENT
[FreeTextEntry1] :                              MODIFIED BARIUM SWALLOW STUDY\par \par Date of Report: 9/28/20\par Date of Evaluation: 9/28/20\par Patient Name: Anna To\par YOB: 1950\par Primary Diagnosis: OroPharyngeal Dysphagia\par Treatment Diagnosis:  OroPharyngeal Dysphagia\par Referring Physician:  Dr. Dane Salguero, GI\par \par Impressions/Results: \par 1.	There is no Laryngeal Penetration or Aspiration before, during or after the swallow with puree and solids. \par 2.	There is Laryngeal Penetration during the swallow with complete retrieval with nectar thick liquids.\par 3.	There is Laryngeal Penetration during the swallow with inconsistent retrieval leaving trace residue in the laryngeal vestibule at times, subsequent aspiration cannot be ruled out due to compromised view of airway due to patient’s body habitus. \par \par Reason For Referral: This 69 year old female was seen for a Modified Barium Swallow to:  rule out aspiration and assess for safest diet consistency.  The physician ordered this procedure because he wants the patient to meet their nutrition/hydration needs by mouth without compromising respiratory status.  \par Patient reports that recently she had three episodes of “tightening” when eating rice and spaghetti while pointing to her throat. She denies feelings of pain, residue or choking.  Patient reports that one of these instances resulted in her vomiting.  She reports that drinking liquids did not help resolve the sensation. Patient denies coughing during meals, denies any pneumonias or any significant gastro, respiratory, or neurological history.  Patient reports that she does have difficulty swallow medications and she crushed them in applesauce. \par \par Current Diet Consistency: Regular solids and Thin Liquids\par \par Medical History: Patient recently seen by Dr. Salguero for the aforementioned problem, who then recommended modified barium swallow study.   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.19)\par \par Past Medical History\par Chronic obstructive pulmonary disease with acute exacerbation (491.21) (J44.1)\par History of Graves' disease (V12.29) (Z86.39)\par History of hyperlipidemia (V12.29) (Z86.39)\par History of hypertension (V12.59) (Z86.79)\par History of hypothyroidism (V12.29) (Z86.39)\par History of malignant neoplasm of left breast (V10.3) (Z85.3)\par History of sleep apnea (V13.89) (Z86.69)\par \par Surgical History\par History of Carpal tunnel surgery\par History of Cholecystectomy\par History of Eye surgery\par History of Laminectomy\par History of Lumpectomy\par History of Nephrectomy partial\par History of Pelvic surgery\par \par \par ASSESSMENT\par The patient was assessed seated in the lateral plane in the [facility], with Radiologist present.  The patient was alert, cooperative.  Secretion management was adequate.  There was no coughing, throat clearing or wet/gurgly vocal quality prior to test administration. \par \par Consistencies Administered:  \par Solids:  solids and puree\par Liquids:  thin liquids and nectar thick liquids\par \par SUMMARY & IMPRESSION\par The patient demonstrated the following:\par \par Preliminary Fluoroscopy reveals:\par Patient with kyphotic positioning, shoulder girdle limiting view of the airway.  Patient with cervical hardware at levels C3-C7 as per radiologist. \par \par Video Fluoroscopic Evaluation reveals:\par 1.	Adequate oral phase for nectar thick liquids and thin liquids characterized by adequate acceptance, adequate anterior bolus containment, adequate tongue to palate seal, adequate anterior to posterior transport and adequate oral cavity clearance. \par 2.	Mild oral phase deficits for puree and solids characterized by adequate acceptance, adequate anterior bolus containment, repetitive lingual movement, prolonged mastication, delayed anterior to posterior transport and adequate oral cavity clearance. \par 3.	Functional pharyngeal phase for puree and solids characterized by adequate initiation of pharyngeal swallow.  Adequate base of tongue retraction, adequate hyolaryngeal elevation and excursion, adequate pharyngeal contraction, adequate laryngeal vestibule closure and adequate pharyngeal clearance.  No Laryngeal Penetration or Aspiration before, during or after the swallow. \par 4.	Mild pharyngeal phase deficits for nectar thick liquids characterized by delayed initiation of the pharyngeal swallow triggering at the lateral epiglottis.  Adequate base of tongue retraction, adequate hyolaryngeal elevation and excursion, adequate pharyngeal contraction, adequate laryngeal vestibule closure and adequate pharyngeal clearance.  Laryngeal Penetration during the swallow with complete retrieval.  \par 5.	Moderate pharyngeal phase deficits for thin liquids characterized by delayed initiation of the pharyngeal swallow triggering with the head of bolus towards the pyriforms.  Adequate base of tongue retraction, reduced hyolaryngeal elevation and excursion, adequate pharyngeal contraction, reduced laryngeal vestibule closure.  Moderate Laryngeal Penetration during the swallow with inconsistent retrieval leaving trace residue in the laryngeal vestibule above the level of the airway at times.  Aspiration cannot be ruled out due to compromised view of airway, due to patient’s body habitus.  Small sips, chin tuck and thin liquids via teaspoon did not eliminate laryngeal penetration. 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  Refer to radiologist’s report for further detail. \par \par Aspiration - Penetration Scale: \par \par Aspiration - Penetration Scale    (Jenniferbek et al Dysphagia 11:93-98 (April 1996), Aspiration-Penetration Scale)\par \par SOLIDS: 1\par PUREE: 1\par NECTAR THICK LIQUIDS: 2\par THIN LIQUIDS: 2&3\par \par 1.    Material does not enter the airway \par 2.    Material enters the airway, remains above the vocal folds, and is ejected from the airway\par 3.    Material enters the airway, remains above the vocal folds, and is not ejected\par 4.    Material enters the airway, contacts the vocal folds, and is ejected from the airway\par 5.    Material enters the airway, contacts the vocal folds, and is not ejected from the airway\par 6.    Material enters the airway, passes below the vocal folds and is ejected into the larynx or out of the airway\par 7.    Material enters the airway, passes below the vocal folds, and is not ejected from the trachea despite effort\par 8.    Material enters the airway, passes below the vocal folds, and no effort is made to eject\par \par Recommendations:\par 1.	Regular solids and Nectar thick Liquids\par 2.	Aspiration Precautions\par 3.	Safe swallow strategies: upright position during meals and for 30 minutes after, small bites and sips\par 4.	Consideration for dysphagia therapy to address aforementioned swallow deficits\par 5.	Follow up with your referring physician \par \par SLP recommended the patient call her referring physician’s office for official results and recommendations. \par \par Should you have any additional concerns, please contact the Center at [phone].\par \par Allison Dreyer MA, CCC-SLP\par Speech-Language Pathologist \par [facility]\par

## 2020-10-12 NOTE — ED ADULT NURSE NOTE - NSFALLRSKUNASSIST_ED_ALL_ED
no Isotretinoin Pregnancy And Lactation Text: This medication is Pregnancy Category X and is considered extremely dangerous during pregnancy. It is unknown if it is excreted in breast milk.

## 2020-10-13 DIAGNOSIS — R13.12 DYSPHAGIA, OROPHARYNGEAL PHASE: ICD-10-CM

## 2020-10-21 PROCEDURE — 99442: CPT | Mod: 95

## 2020-11-03 NOTE — DIETITIAN INITIAL EVALUATION ADULT. - PHYSICAL APPEARANCE
Medical Necessity Clause: This procedure was medically necessary because the lesions that were treated were: irritated enlarging and pruritic Post-Care Instructions: I reviewed with the patient in detail post-care instructions. Patient is to wear sunprotection, and avoid picking at any of the treated lesions. Pt may apply Vaseline to crusted or scabbing areas. Render Post-Care Instructions In Note?: no Medical Necessity Information: It is in your best interest to select a reason for this procedure from the list below. All of these items fulfill various CMS LCD requirements except the new and changing color options. Detail Level: Detailed Include Z78.9 (Other Specified Conditions Influencing Health Status) As An Associated Diagnosis?: Yes Consent: The patient's consent was obtained including but not limited to risks of crusting, scabbing, blistering, scarring, darker or lighter pigmentary change, recurrence, incomplete removal and infection. obese

## 2020-11-18 PROCEDURE — 99442: CPT | Mod: 95

## 2020-11-22 NOTE — ED ADULT NURSE NOTE - THOUGHTS OF HOMICIDE/VIOLENCE TOWARDS OTHERS YN, MLM
Patient complaining of left side that began last Friday.  Patient states the pain is been for 10 days and tingling.  Noted to have scabs to the left side around the flank area.     No

## 2020-12-14 ENCOUNTER — APPOINTMENT (OUTPATIENT)
Dept: GASTROENTEROLOGY | Facility: CLINIC | Age: 70
End: 2020-12-14
Payer: MEDICARE

## 2020-12-14 VITALS
SYSTOLIC BLOOD PRESSURE: 173 MMHG | HEART RATE: 81 BPM | OXYGEN SATURATION: 94 % | HEIGHT: 60 IN | TEMPERATURE: 97.4 F | DIASTOLIC BLOOD PRESSURE: 90 MMHG

## 2020-12-14 DIAGNOSIS — F41.9 ANXIETY DISORDER, UNSPECIFIED: ICD-10-CM

## 2020-12-14 DIAGNOSIS — G81.90 HEMIPLEGIA, UNSPECIFIED AFFECTING UNSPECIFIED SIDE: ICD-10-CM

## 2020-12-14 DIAGNOSIS — Z20.828 CONTACT WITH AND (SUSPECTED) EXPOSURE TO OTHER VIRAL COMMUNICABLE DISEASES: ICD-10-CM

## 2020-12-14 PROCEDURE — 99214 OFFICE O/P EST MOD 30 MIN: CPT | Mod: CS

## 2020-12-14 RX ORDER — MULTIVIT-MIN/FOLIC/VIT K/LYCOP 400-300MCG
25 MCG TABLET ORAL
Qty: 90 | Refills: 0 | Status: DISCONTINUED | COMMUNITY
Start: 2020-04-27 | End: 2020-12-14

## 2020-12-14 RX ORDER — PREDNISONE 10 MG/1
10 TABLET ORAL
Qty: 90 | Refills: 0 | Status: DISCONTINUED | COMMUNITY
Start: 2020-02-24 | End: 2020-12-14

## 2020-12-14 RX ORDER — GABAPENTIN 400 MG/1
400 CAPSULE ORAL
Qty: 360 | Refills: 0 | Status: DISCONTINUED | COMMUNITY
Start: 2018-07-17 | End: 2020-12-14

## 2020-12-14 RX ORDER — DULOXETINE HYDROCHLORIDE 60 MG/1
60 CAPSULE, DELAYED RELEASE PELLETS ORAL
Qty: 30 | Refills: 0 | Status: DISCONTINUED | COMMUNITY
Start: 2018-04-13 | End: 2020-12-14

## 2020-12-14 RX ORDER — PREDNISONE 5 MG/1
5 TABLET ORAL
Qty: 90 | Refills: 0 | Status: ACTIVE | COMMUNITY
Start: 2020-09-30

## 2020-12-14 RX ORDER — FOLIC ACID 1 MG/1
1 TABLET ORAL
Qty: 90 | Refills: 0 | Status: DISCONTINUED | COMMUNITY
Start: 2018-05-15 | End: 2020-12-14

## 2020-12-14 RX ORDER — PREDNISOLONE ORAL 15 MG/5ML
15 SOLUTION ORAL
Refills: 0 | Status: DISCONTINUED | COMMUNITY
Start: 2018-07-02 | End: 2020-12-14

## 2020-12-14 RX ORDER — ALBUTEROL SULFATE 2.5 MG/3ML
(2.5 MG/3ML) SOLUTION RESPIRATORY (INHALATION)
Refills: 0 | Status: DISCONTINUED | COMMUNITY
Start: 2017-04-22 | End: 2020-12-14

## 2020-12-14 RX ORDER — PREDNISONE 5 MG/5ML
5 SOLUTION ORAL
Qty: 120 | Refills: 0 | Status: DISCONTINUED | COMMUNITY
Start: 2018-04-25 | End: 2020-12-14

## 2020-12-14 RX ORDER — OXYCODONE 5 MG/1
5 TABLET ORAL
Qty: 120 | Refills: 0 | Status: DISCONTINUED | COMMUNITY
Start: 2018-04-04 | End: 2020-12-14

## 2020-12-14 RX ORDER — FUROSEMIDE 40 MG/1
40 TABLET ORAL
Qty: 30 | Refills: 0 | Status: DISCONTINUED | COMMUNITY
Start: 2020-09-30 | End: 2020-12-14

## 2020-12-14 NOTE — HISTORY OF PRESENT ILLNESS
[de-identified] : 70 year old woman with dysphagia. She had problems with rice and pasta but is now avoiding them. She has no further problem with other foods or liquids. Speech and swallow evaluation shows no obstruction or aspiration. Swallowing training was  demonstrated. She also complains of daily diarrhea for 6 months that is new. for which she takes Imodium. She denies rectal bleeding, melena or hematemesis.

## 2020-12-16 PROCEDURE — 99443: CPT | Mod: 95

## 2020-12-30 LAB — HEMOCCULT STL QL IA: NEGATIVE

## 2020-12-30 NOTE — DISCHARGE NOTE PROVIDER - NSDCHC_MEDRECSTATUS_GEN_ALL_CORE
Admission Reconciliation is Not Complete  Discharge Reconciliation is Not Complete Admission Reconciliation is Not Complete  Discharge Reconciliation is Completed Acitretin Counseling:  I discussed with the patient the risks of acitretin including but not limited to hair loss, dry lips/skin/eyes, liver damage, hyperlipidemia, depression/suicidal ideation, photosensitivity.  Serious rare side effects can include but are not limited to pancreatitis, pseudotumor cerebri, bony changes, clot formation/stroke/heart attack.  Patient understands that alcohol is contraindicated since it can result in liver toxicity and significantly prolong the elimination of the drug by many years.

## 2021-01-06 ENCOUNTER — INPATIENT (INPATIENT)
Facility: HOSPITAL | Age: 71
LOS: 6 days | Discharge: HOME | DRG: 177 | End: 2021-01-13
Attending: GENERAL PRACTICE | Admitting: GENERAL PRACTICE
Payer: MEDICARE

## 2021-01-06 VITALS
TEMPERATURE: 101 F | HEART RATE: 86 BPM | RESPIRATION RATE: 18 BRPM | DIASTOLIC BLOOD PRESSURE: 60 MMHG | OXYGEN SATURATION: 97 % | HEIGHT: 60 IN | SYSTOLIC BLOOD PRESSURE: 132 MMHG

## 2021-01-06 DIAGNOSIS — I10 ESSENTIAL (PRIMARY) HYPERTENSION: ICD-10-CM

## 2021-01-06 DIAGNOSIS — Z98.89 OTHER SPECIFIED POSTPROCEDURAL STATES: Chronic | ICD-10-CM

## 2021-01-06 DIAGNOSIS — L03.90 CELLULITIS, UNSPECIFIED: ICD-10-CM

## 2021-01-06 DIAGNOSIS — E03.9 HYPOTHYROIDISM, UNSPECIFIED: ICD-10-CM

## 2021-01-06 DIAGNOSIS — E05.00 THYROTOXICOSIS WITH DIFFUSE GOITER WITHOUT THYROTOXIC CRISIS OR STORM: ICD-10-CM

## 2021-01-06 DIAGNOSIS — J44.9 CHRONIC OBSTRUCTIVE PULMONARY DISEASE, UNSPECIFIED: ICD-10-CM

## 2021-01-06 DIAGNOSIS — U07.1 COVID-19: ICD-10-CM

## 2021-01-06 DIAGNOSIS — Z90.5 ACQUIRED ABSENCE OF KIDNEY: Chronic | ICD-10-CM

## 2021-01-06 DIAGNOSIS — Z90.49 ACQUIRED ABSENCE OF OTHER SPECIFIED PARTS OF DIGESTIVE TRACT: Chronic | ICD-10-CM

## 2021-01-06 DIAGNOSIS — Z29.9 ENCOUNTER FOR PROPHYLACTIC MEASURES, UNSPECIFIED: ICD-10-CM

## 2021-01-06 DIAGNOSIS — Z71.89 OTHER SPECIFIED COUNSELING: ICD-10-CM

## 2021-01-06 LAB
A1C WITH ESTIMATED AVERAGE GLUCOSE RESULT: 5.4 % — SIGNIFICANT CHANGE UP (ref 4–5.6)
ALBUMIN SERPL ELPH-MCNC: 3.2 G/DL — LOW (ref 3.5–5)
ALP SERPL-CCNC: 68 U/L — SIGNIFICANT CHANGE UP (ref 40–120)
ALT FLD-CCNC: 33 U/L DA — SIGNIFICANT CHANGE UP (ref 10–60)
ANION GAP SERPL CALC-SCNC: 8 MMOL/L — SIGNIFICANT CHANGE UP (ref 5–17)
AST SERPL-CCNC: 49 U/L — HIGH (ref 10–40)
BASOPHILS # BLD AUTO: 0.01 K/UL — SIGNIFICANT CHANGE UP (ref 0–0.2)
BASOPHILS NFR BLD AUTO: 0.2 % — SIGNIFICANT CHANGE UP (ref 0–2)
BILIRUB SERPL-MCNC: 0.6 MG/DL — SIGNIFICANT CHANGE UP (ref 0.2–1.2)
BUN SERPL-MCNC: 21 MG/DL — HIGH (ref 7–18)
CALCIUM SERPL-MCNC: 9.1 MG/DL — SIGNIFICANT CHANGE UP (ref 8.4–10.5)
CHLORIDE SERPL-SCNC: 103 MMOL/L — SIGNIFICANT CHANGE UP (ref 96–108)
CHOLEST SERPL-MCNC: 150 MG/DL — SIGNIFICANT CHANGE UP
CO2 SERPL-SCNC: 28 MMOL/L — SIGNIFICANT CHANGE UP (ref 22–31)
CREAT SERPL-MCNC: 0.82 MG/DL — SIGNIFICANT CHANGE UP (ref 0.5–1.3)
CRP SERPL-MCNC: 5.01 MG/DL — HIGH (ref 0–0.4)
D DIMER BLD IA.RAPID-MCNC: 573 NG/ML DDU — HIGH
EOSINOPHIL # BLD AUTO: 0 K/UL — SIGNIFICANT CHANGE UP (ref 0–0.5)
EOSINOPHIL NFR BLD AUTO: 0 % — SIGNIFICANT CHANGE UP (ref 0–6)
ESTIMATED AVERAGE GLUCOSE: 108 MG/DL — SIGNIFICANT CHANGE UP (ref 68–114)
FERRITIN SERPL-MCNC: 209 NG/ML — HIGH (ref 15–150)
FOLATE SERPL-MCNC: >20 NG/ML — SIGNIFICANT CHANGE UP
GLUCOSE SERPL-MCNC: 87 MG/DL — SIGNIFICANT CHANGE UP (ref 70–99)
HCT VFR BLD CALC: 37.5 % — SIGNIFICANT CHANGE UP (ref 34.5–45)
HDLC SERPL-MCNC: 45 MG/DL — LOW
HGB BLD-MCNC: 11.3 G/DL — LOW (ref 11.5–15.5)
IMM GRANULOCYTES NFR BLD AUTO: 0.6 % — SIGNIFICANT CHANGE UP (ref 0–1.5)
INR BLD: 1.22 RATIO — HIGH (ref 0.88–1.16)
LACTATE SERPL-SCNC: 1.6 MMOL/L — SIGNIFICANT CHANGE UP (ref 0.7–2)
LIPID PNL WITH DIRECT LDL SERPL: 79 MG/DL — SIGNIFICANT CHANGE UP
LYMPHOCYTES # BLD AUTO: 0.73 K/UL — LOW (ref 1–3.3)
LYMPHOCYTES # BLD AUTO: 13.4 % — SIGNIFICANT CHANGE UP (ref 13–44)
MCHC RBC-ENTMCNC: 27.4 PG — SIGNIFICANT CHANGE UP (ref 27–34)
MCHC RBC-ENTMCNC: 30.1 GM/DL — LOW (ref 32–36)
MCV RBC AUTO: 90.8 FL — SIGNIFICANT CHANGE UP (ref 80–100)
MONOCYTES # BLD AUTO: 0.55 K/UL — SIGNIFICANT CHANGE UP (ref 0–0.9)
MONOCYTES NFR BLD AUTO: 10.1 % — SIGNIFICANT CHANGE UP (ref 2–14)
NEUTROPHILS # BLD AUTO: 4.11 K/UL — SIGNIFICANT CHANGE UP (ref 1.8–7.4)
NEUTROPHILS NFR BLD AUTO: 75.7 % — SIGNIFICANT CHANGE UP (ref 43–77)
NON HDL CHOLESTEROL: 105 MG/DL — SIGNIFICANT CHANGE UP
NRBC # BLD: 0 /100 WBCS — SIGNIFICANT CHANGE UP (ref 0–0)
PLATELET # BLD AUTO: 107 K/UL — LOW (ref 150–400)
POTASSIUM SERPL-MCNC: 3.4 MMOL/L — LOW (ref 3.5–5.3)
POTASSIUM SERPL-SCNC: 3.4 MMOL/L — LOW (ref 3.5–5.3)
PROCALCITONIN SERPL-MCNC: 0.12 NG/ML — HIGH (ref 0.02–0.1)
PROT SERPL-MCNC: 7.1 G/DL — SIGNIFICANT CHANGE UP (ref 6–8.3)
PROTHROM AB SERPL-ACNC: 14.4 SEC — HIGH (ref 10.6–13.6)
RBC # BLD: 4.13 M/UL — SIGNIFICANT CHANGE UP (ref 3.8–5.2)
RBC # FLD: 16 % — HIGH (ref 10.3–14.5)
SARS-COV-2 RNA SPEC QL NAA+PROBE: DETECTED
SODIUM SERPL-SCNC: 139 MMOL/L — SIGNIFICANT CHANGE UP (ref 135–145)
TRIGL SERPL-MCNC: 129 MG/DL — SIGNIFICANT CHANGE UP
TSH SERPL-MCNC: 0.78 UU/ML — SIGNIFICANT CHANGE UP (ref 0.34–4.82)
VIT B12 SERPL-MCNC: 240 PG/ML — SIGNIFICANT CHANGE UP (ref 232–1245)
WBC # BLD: 5.43 K/UL — SIGNIFICANT CHANGE UP (ref 3.8–10.5)
WBC # FLD AUTO: 5.43 K/UL — SIGNIFICANT CHANGE UP (ref 3.8–10.5)

## 2021-01-06 PROCEDURE — 71045 X-RAY EXAM CHEST 1 VIEW: CPT | Mod: 26

## 2021-01-06 PROCEDURE — 99285 EMERGENCY DEPT VISIT HI MDM: CPT | Mod: CS

## 2021-01-06 PROCEDURE — 93970 EXTREMITY STUDY: CPT | Mod: 26

## 2021-01-06 RX ORDER — LOSARTAN POTASSIUM 100 MG/1
100 TABLET, FILM COATED ORAL DAILY
Refills: 0 | Status: DISCONTINUED | OUTPATIENT
Start: 2021-01-06 | End: 2021-01-13

## 2021-01-06 RX ORDER — FUROSEMIDE 40 MG
20 TABLET ORAL DAILY
Refills: 0 | Status: DISCONTINUED | OUTPATIENT
Start: 2021-01-06 | End: 2021-01-13

## 2021-01-06 RX ORDER — DEXAMETHASONE 0.5 MG/5ML
6 ELIXIR ORAL DAILY
Refills: 0 | Status: DISCONTINUED | OUTPATIENT
Start: 2021-01-06 | End: 2021-01-06

## 2021-01-06 RX ORDER — CEFAZOLIN SODIUM 1 G
1000 VIAL (EA) INJECTION ONCE
Refills: 0 | Status: COMPLETED | OUTPATIENT
Start: 2021-01-06 | End: 2021-01-06

## 2021-01-06 RX ORDER — CEFAZOLIN SODIUM 1 G
1000 VIAL (EA) INJECTION EVERY 8 HOURS
Refills: 0 | Status: DISCONTINUED | OUTPATIENT
Start: 2021-01-06 | End: 2021-01-10

## 2021-01-06 RX ORDER — REMDESIVIR 5 MG/ML
200 INJECTION INTRAVENOUS EVERY 24 HOURS
Refills: 0 | Status: COMPLETED | OUTPATIENT
Start: 2021-01-06 | End: 2021-01-06

## 2021-01-06 RX ORDER — DEXAMETHASONE 0.5 MG/5ML
6 ELIXIR ORAL DAILY
Refills: 0 | Status: COMPLETED | OUTPATIENT
Start: 2021-01-06 | End: 2021-01-11

## 2021-01-06 RX ORDER — ATORVASTATIN CALCIUM 80 MG/1
40 TABLET, FILM COATED ORAL AT BEDTIME
Refills: 0 | Status: DISCONTINUED | OUTPATIENT
Start: 2021-01-06 | End: 2021-01-13

## 2021-01-06 RX ORDER — REMDESIVIR 5 MG/ML
INJECTION INTRAVENOUS
Refills: 0 | Status: DISCONTINUED | OUTPATIENT
Start: 2021-01-06 | End: 2021-01-06

## 2021-01-06 RX ORDER — ACETAMINOPHEN 500 MG
650 TABLET ORAL ONCE
Refills: 0 | Status: COMPLETED | OUTPATIENT
Start: 2021-01-06 | End: 2021-01-06

## 2021-01-06 RX ORDER — CEFAZOLIN SODIUM 1 G
VIAL (EA) INJECTION
Refills: 0 | Status: DISCONTINUED | OUTPATIENT
Start: 2021-01-06 | End: 2021-01-06

## 2021-01-06 RX ORDER — CHOLECALCIFEROL (VITAMIN D3) 125 MCG
1000 CAPSULE ORAL DAILY
Refills: 0 | Status: DISCONTINUED | OUTPATIENT
Start: 2021-01-06 | End: 2021-01-13

## 2021-01-06 RX ORDER — REMDESIVIR 5 MG/ML
100 INJECTION INTRAVENOUS EVERY 24 HOURS
Refills: 0 | Status: DISCONTINUED | OUTPATIENT
Start: 2021-01-07 | End: 2021-01-08

## 2021-01-06 RX ORDER — HYDROCHLOROTHIAZIDE 25 MG
25 TABLET ORAL DAILY
Refills: 0 | Status: DISCONTINUED | OUTPATIENT
Start: 2021-01-06 | End: 2021-01-13

## 2021-01-06 RX ORDER — ENOXAPARIN SODIUM 100 MG/ML
40 INJECTION SUBCUTANEOUS DAILY
Refills: 0 | Status: DISCONTINUED | OUTPATIENT
Start: 2021-01-06 | End: 2021-01-13

## 2021-01-06 RX ORDER — OLANZAPINE 15 MG/1
10 TABLET, FILM COATED ORAL DAILY
Refills: 0 | Status: DISCONTINUED | OUTPATIENT
Start: 2021-01-06 | End: 2021-01-13

## 2021-01-06 RX ORDER — MONTELUKAST 4 MG/1
10 TABLET, CHEWABLE ORAL DAILY
Refills: 0 | Status: DISCONTINUED | OUTPATIENT
Start: 2021-01-06 | End: 2021-01-13

## 2021-01-06 RX ORDER — ALBUTEROL 90 UG/1
1 AEROSOL, METERED ORAL EVERY 4 HOURS
Refills: 0 | Status: DISCONTINUED | OUTPATIENT
Start: 2021-01-06 | End: 2021-01-13

## 2021-01-06 RX ORDER — POTASSIUM CHLORIDE 20 MEQ
20 PACKET (EA) ORAL
Refills: 0 | Status: COMPLETED | OUTPATIENT
Start: 2021-01-06 | End: 2021-01-06

## 2021-01-06 RX ORDER — ASPIRIN/CALCIUM CARB/MAGNESIUM 324 MG
81 TABLET ORAL DAILY
Refills: 0 | Status: DISCONTINUED | OUTPATIENT
Start: 2021-01-06 | End: 2021-01-13

## 2021-01-06 RX ORDER — PANTOPRAZOLE SODIUM 20 MG/1
40 TABLET, DELAYED RELEASE ORAL
Refills: 0 | Status: DISCONTINUED | OUTPATIENT
Start: 2021-01-06 | End: 2021-01-13

## 2021-01-06 RX ORDER — GABAPENTIN 400 MG/1
100 CAPSULE ORAL DAILY
Refills: 0 | Status: DISCONTINUED | OUTPATIENT
Start: 2021-01-06 | End: 2021-01-13

## 2021-01-06 RX ORDER — LEVOTHYROXINE SODIUM 125 MCG
125 TABLET ORAL DAILY
Refills: 0 | Status: DISCONTINUED | OUTPATIENT
Start: 2021-01-06 | End: 2021-01-13

## 2021-01-06 RX ORDER — SERTRALINE 25 MG/1
100 TABLET, FILM COATED ORAL DAILY
Refills: 0 | Status: DISCONTINUED | OUTPATIENT
Start: 2021-01-06 | End: 2021-01-13

## 2021-01-06 RX ORDER — REMDESIVIR 5 MG/ML
INJECTION INTRAVENOUS
Refills: 0 | Status: DISCONTINUED | OUTPATIENT
Start: 2021-01-06 | End: 2021-01-08

## 2021-01-06 RX ORDER — CEFAZOLIN SODIUM 1 G
VIAL (EA) INJECTION
Refills: 0 | Status: DISCONTINUED | OUTPATIENT
Start: 2021-01-06 | End: 2021-01-10

## 2021-01-06 RX ADMIN — Medication 100 MILLIGRAM(S): at 16:35

## 2021-01-06 RX ADMIN — PANTOPRAZOLE SODIUM 40 MILLIGRAM(S): 20 TABLET, DELAYED RELEASE ORAL at 16:36

## 2021-01-06 RX ADMIN — ALBUTEROL 1 PUFF(S): 90 AEROSOL, METERED ORAL at 21:31

## 2021-01-06 RX ADMIN — REMDESIVIR 500 MILLIGRAM(S): 5 INJECTION INTRAVENOUS at 16:35

## 2021-01-06 RX ADMIN — Medication 20 MILLIEQUIVALENT(S): at 21:29

## 2021-01-06 RX ADMIN — Medication 20 MILLIEQUIVALENT(S): at 22:43

## 2021-01-06 RX ADMIN — SERTRALINE 100 MILLIGRAM(S): 25 TABLET, FILM COATED ORAL at 17:29

## 2021-01-06 RX ADMIN — Medication 6 MILLIGRAM(S): at 16:35

## 2021-01-06 RX ADMIN — Medication 81 MILLIGRAM(S): at 14:42

## 2021-01-06 RX ADMIN — Medication 100 MILLIGRAM(S): at 21:30

## 2021-01-06 RX ADMIN — ALBUTEROL 1 PUFF(S): 90 AEROSOL, METERED ORAL at 13:52

## 2021-01-06 RX ADMIN — Medication 1000 UNIT(S): at 17:29

## 2021-01-06 RX ADMIN — ENOXAPARIN SODIUM 40 MILLIGRAM(S): 100 INJECTION SUBCUTANEOUS at 14:43

## 2021-01-06 RX ADMIN — Medication 125 MICROGRAM(S): at 16:36

## 2021-01-06 RX ADMIN — ALBUTEROL 1 PUFF(S): 90 AEROSOL, METERED ORAL at 16:36

## 2021-01-06 RX ADMIN — Medication 650 MILLIGRAM(S): at 11:01

## 2021-01-06 RX ADMIN — OLANZAPINE 10 MILLIGRAM(S): 15 TABLET, FILM COATED ORAL at 16:36

## 2021-01-06 RX ADMIN — MONTELUKAST 10 MILLIGRAM(S): 4 TABLET, CHEWABLE ORAL at 14:43

## 2021-01-06 RX ADMIN — GABAPENTIN 100 MILLIGRAM(S): 400 CAPSULE ORAL at 17:29

## 2021-01-06 NOTE — H&P ADULT - PROBLEM SELECTOR PLAN 1
Presented with dyspnea, was found to be hypoxic  COVID positive  CXR shows mild infiltrates and mild effusion  Saturating 90 on RA, improved to 95 on 3L  Will start on decadron and remdesivir  f/u markers  Monitor respiratory status and supplement o2

## 2021-01-06 NOTE — ED ADULT NURSE NOTE - OBJECTIVE STATEMENT
pt presents to ed for weakness & diarrhea x1 week. pt unable to see her stools, but endorses going frequently to the bathroom to make a bm and assumes she has diarrhea. Endorses fever x1 day. Denies abdominal pain, uti sxs, cough, sob, cp, palpitations, HA, dizziness. Endorses  is covid + at home.

## 2021-01-06 NOTE — H&P ADULT - PROBLEM SELECTOR PLAN 5
Has h/o HTN  holding amlodipine due to lower extremity edema  c/w other meds with parameters  monitor BP

## 2021-01-06 NOTE — H&P ADULT - PROBLEM SELECTOR PLAN 3
Has h/o chronic edema of LE  Now has mild erythema and pain  will start on cefazolin Has h/o chronic edema of LE  Now has mild erythema and pain  mostly chronic stasis dermatitis   started on cefazolin >> likely short course ( HD)

## 2021-01-06 NOTE — ED PROVIDER NOTE - PROGRESS NOTE DETAILS
Patient desatted to 88%RA. will keep on nasal cannula 2L and admit. Patient is resting comfortably, NAD. Endorsed to Dr. Welch.

## 2021-01-06 NOTE — H&P ADULT - CARDIOVASCULAR
detailed exam Is This A New Presentation, Or A Follow-Up?: Skin Lesions How Severe Is Your Skin Lesion?: moderate Have Your Skin Lesions Been Treated?: not been treated

## 2021-01-06 NOTE — ED PROVIDER NOTE - OBJECTIVE STATEMENT
Patient reports 2 days of fever, diarrhea, generalized weakness. No ha, cough, cp, sob, ap, n/v.  has COVID.

## 2021-01-06 NOTE — H&P ADULT - NSHPPHYSICALEXAM_GEN_ALL_CORE
Vital Signs (24 Hrs):  T(C): 38.3 (01-06-21 @ 12:25), Max: 38.5 (01-06-21 @ 09:53)  HR: 79 (01-06-21 @ 12:25) (79 - 86)  BP: 107/45 (01-06-21 @ 12:25) (107/45 - 132/60)  RR: 18 (01-06-21 @ 12:25) (18 - 20)  SpO2: 96% (01-06-21 @ 12:25) (95% - 97%)y

## 2021-01-06 NOTE — H&P ADULT - NSCORESITESY/N_GEN_A_CORE_RD
Telephone Encounter by Francine Marc MD at 08/15/17 07:44 PM     Author:  Francine Marc MD Service:  (none) Author Type:  Physician     Filed:  08/15/17 07:44 PM Encounter Date:  8/15/2017 Status:  Signed     :  Francine Marc MD (Physician)            Signed[TB1.1M]        Revision History        User Key Date/Time User Provider Type Action    > TB1.1 08/15/17 07:44 PM Francine Marc MD Physician Sign    M - Manual             Yes

## 2021-01-06 NOTE — H&P ADULT - HISTORY OF PRESENT ILLNESS
71 yo female with medical history significant for Anxiety, Asthma, Breast cancer in situ, COPD (chronic obstructive pulmonary disease). DVT (deep venous thrombosis) not presently on A/C, Graves disease, Hyperlipidemia, Hypertension, Hypothyroid, Parathyroidectomy, Kidney cancer, primary, with metastasis from kidney to other site,  LEft sided nephrectomy only- no chemo or RT, Obesity, Sleep apnea comes in  with dyspnea and fever for past 2 days. She states that she has been feeling feverish and has chills with it. Did not measure temperature. Also has dyspnea more on exertion, progressive, associated with mild cough, whitish sputum. Her  was tested positive for covid and is sick with similar symptoms. Denies abdominal pain, nausea, vomiting, diarrhea, chest pain, urinary symptoms. She also has lower extremity edema which is chronic, but has been painful for past few days.  69 yo female with medical history significant for Anxiety, Asthma, Breast cancer in situ, COPD (chronic obstructive pulmonary disease). DVT (deep venous thrombosis) not presently on A/C, Graves disease, Hyperlipidemia, Hypertension, Hypothyroid, Parathyroidectomy, Kidney cancer, primary, with metastasis from kidney to other site,  LEft sided nephrectomy only- no chemo or RT, Obesity, Sleep apnea comes in  with dyspnea and fever for past 2 days.   She states that she has been feeling feverish and has chills with it. Did not measure temperature. Also has dyspnea more on exertion, progressive, associated with mild cough, whitish sputum.   Her  was tested positive for covid and is sick with similar symptoms. pt's son also coughing per pt   Denies abdominal pain, nausea, vomiting, diarrhea, chest pain, urinary symptoms.   She also has lower extremity edema which is chronic, but has been painful for past few days.

## 2021-01-06 NOTE — H&P ADULT - ATTENDING COMMENTS
Patient seen, examined, and interviewed by me, case discussed with me, chart reviewed, agree with above H/P as reviewed.  See  full note above.  Dr. DIANE Welch ([phone])

## 2021-01-06 NOTE — H&P ADULT - ASSESSMENT
1 yo female with medical history significant for Anxiety, Asthma, Breast cancer in situ, COPD (chronic obstructive pulmonary disease). DVT (deep venous thrombosis) not presently on A/C, Graves disease, Hyperlipidemia, Hypertension, Hypothyroid, Parathyroidectomy, Kidney cancer, primary, with metastasis from kidney to other site,  LEft sided nephrectomy only- no chemo or RT, Obesity, Sleep apnea comes in  with dyspnea and fever for past 2 days.    ED  COVID positive  Saturating 90% on RA    Patient is being admitted to medicine for acute hypoxic respiratory failure secondary to COVID infection.

## 2021-01-06 NOTE — H&P ADULT - NSHPREVIEWOFSYSTEMS_GEN_ALL_CORE
GEN: no fever, no chills, no pain  RESP: no signif SOB at rest , ++ cough, no sputum  CVS: no chest pain, no palpitations, + chronic bilateral LE edema  GI: no abdominal pain, no nausea, no vomiting,   : no dysuria, no frequency, no hematuria  NEURO: no headache, no dizziness  PSYCH: no depression, + anxious  Derm : no itching, no rash

## 2021-01-06 NOTE — H&P ADULT - PROBLEM SELECTOR PLAN 4
Has h/o Graves disease, has exophthalmus  Also has scar on neck which she says has parathyroidectomy  Was on prednisone 5mg

## 2021-01-07 DIAGNOSIS — Z02.9 ENCOUNTER FOR ADMINISTRATIVE EXAMINATIONS, UNSPECIFIED: ICD-10-CM

## 2021-01-07 LAB
ALBUMIN SERPL ELPH-MCNC: 2.8 G/DL — LOW (ref 3.5–5)
ALP SERPL-CCNC: 62 U/L — SIGNIFICANT CHANGE UP (ref 40–120)
ALT FLD-CCNC: 36 U/L DA — SIGNIFICANT CHANGE UP (ref 10–60)
ANION GAP SERPL CALC-SCNC: 8 MMOL/L — SIGNIFICANT CHANGE UP (ref 5–17)
AST SERPL-CCNC: 68 U/L — HIGH (ref 10–40)
BASOPHILS # BLD AUTO: 0 K/UL — SIGNIFICANT CHANGE UP (ref 0–0.2)
BASOPHILS NFR BLD AUTO: 0 % — SIGNIFICANT CHANGE UP (ref 0–2)
BILIRUB SERPL-MCNC: 0.5 MG/DL — SIGNIFICANT CHANGE UP (ref 0.2–1.2)
BUN SERPL-MCNC: 19 MG/DL — HIGH (ref 7–18)
CALCIUM SERPL-MCNC: 8.7 MG/DL — SIGNIFICANT CHANGE UP (ref 8.4–10.5)
CHLORIDE SERPL-SCNC: 106 MMOL/L — SIGNIFICANT CHANGE UP (ref 96–108)
CO2 SERPL-SCNC: 28 MMOL/L — SIGNIFICANT CHANGE UP (ref 22–31)
CREAT SERPL-MCNC: 0.63 MG/DL — SIGNIFICANT CHANGE UP (ref 0.5–1.3)
EOSINOPHIL # BLD AUTO: 0 K/UL — SIGNIFICANT CHANGE UP (ref 0–0.5)
EOSINOPHIL NFR BLD AUTO: 0 % — SIGNIFICANT CHANGE UP (ref 0–6)
GLUCOSE SERPL-MCNC: 89 MG/DL — SIGNIFICANT CHANGE UP (ref 70–99)
HCT VFR BLD CALC: 35.7 % — SIGNIFICANT CHANGE UP (ref 34.5–45)
HGB BLD-MCNC: 11.2 G/DL — LOW (ref 11.5–15.5)
IMM GRANULOCYTES NFR BLD AUTO: 0.2 % — SIGNIFICANT CHANGE UP (ref 0–1.5)
LYMPHOCYTES # BLD AUTO: 0.73 K/UL — LOW (ref 1–3.3)
LYMPHOCYTES # BLD AUTO: 15.8 % — SIGNIFICANT CHANGE UP (ref 13–44)
MAGNESIUM SERPL-MCNC: 1.8 MG/DL — SIGNIFICANT CHANGE UP (ref 1.6–2.6)
MCHC RBC-ENTMCNC: 27.9 PG — SIGNIFICANT CHANGE UP (ref 27–34)
MCHC RBC-ENTMCNC: 31.4 GM/DL — LOW (ref 32–36)
MCV RBC AUTO: 88.8 FL — SIGNIFICANT CHANGE UP (ref 80–100)
MONOCYTES # BLD AUTO: 0.41 K/UL — SIGNIFICANT CHANGE UP (ref 0–0.9)
MONOCYTES NFR BLD AUTO: 8.9 % — SIGNIFICANT CHANGE UP (ref 2–14)
NEUTROPHILS # BLD AUTO: 3.48 K/UL — SIGNIFICANT CHANGE UP (ref 1.8–7.4)
NEUTROPHILS NFR BLD AUTO: 75.1 % — SIGNIFICANT CHANGE UP (ref 43–77)
NRBC # BLD: 0 /100 WBCS — SIGNIFICANT CHANGE UP (ref 0–0)
PLATELET # BLD AUTO: 103 K/UL — LOW (ref 150–400)
POTASSIUM SERPL-MCNC: 3.6 MMOL/L — SIGNIFICANT CHANGE UP (ref 3.5–5.3)
POTASSIUM SERPL-SCNC: 3.6 MMOL/L — SIGNIFICANT CHANGE UP (ref 3.5–5.3)
PROT SERPL-MCNC: 6.8 G/DL — SIGNIFICANT CHANGE UP (ref 6–8.3)
RBC # BLD: 4.02 M/UL — SIGNIFICANT CHANGE UP (ref 3.8–5.2)
RBC # FLD: 15.6 % — HIGH (ref 10.3–14.5)
SODIUM SERPL-SCNC: 142 MMOL/L — SIGNIFICANT CHANGE UP (ref 135–145)
WBC # BLD: 4.63 K/UL — SIGNIFICANT CHANGE UP (ref 3.8–10.5)
WBC # FLD AUTO: 4.63 K/UL — SIGNIFICANT CHANGE UP (ref 3.8–10.5)

## 2021-01-07 RX ORDER — TIOTROPIUM BROMIDE 18 UG/1
1 CAPSULE ORAL; RESPIRATORY (INHALATION) DAILY
Refills: 0 | Status: DISCONTINUED | OUTPATIENT
Start: 2021-01-07 | End: 2021-01-13

## 2021-01-07 RX ORDER — OXYCODONE AND ACETAMINOPHEN 5; 325 MG/1; MG/1
1 TABLET ORAL ONCE
Refills: 0 | Status: DISCONTINUED | OUTPATIENT
Start: 2021-01-07 | End: 2021-01-07

## 2021-01-07 RX ADMIN — Medication 1000 UNIT(S): at 11:43

## 2021-01-07 RX ADMIN — MONTELUKAST 10 MILLIGRAM(S): 4 TABLET, CHEWABLE ORAL at 12:25

## 2021-01-07 RX ADMIN — ATORVASTATIN CALCIUM 40 MILLIGRAM(S): 80 TABLET, FILM COATED ORAL at 23:11

## 2021-01-07 RX ADMIN — ALBUTEROL 1 PUFF(S): 90 AEROSOL, METERED ORAL at 12:26

## 2021-01-07 RX ADMIN — GABAPENTIN 100 MILLIGRAM(S): 400 CAPSULE ORAL at 12:25

## 2021-01-07 RX ADMIN — Medication 125 MICROGRAM(S): at 09:22

## 2021-01-07 RX ADMIN — Medication 25 MILLIGRAM(S): at 05:02

## 2021-01-07 RX ADMIN — ALBUTEROL 1 PUFF(S): 90 AEROSOL, METERED ORAL at 23:11

## 2021-01-07 RX ADMIN — ALBUTEROL 1 PUFF(S): 90 AEROSOL, METERED ORAL at 17:51

## 2021-01-07 RX ADMIN — ALBUTEROL 1 PUFF(S): 90 AEROSOL, METERED ORAL at 08:29

## 2021-01-07 RX ADMIN — LOSARTAN POTASSIUM 100 MILLIGRAM(S): 100 TABLET, FILM COATED ORAL at 09:22

## 2021-01-07 RX ADMIN — REMDESIVIR 500 MILLIGRAM(S): 5 INJECTION INTRAVENOUS at 17:51

## 2021-01-07 RX ADMIN — OLANZAPINE 10 MILLIGRAM(S): 15 TABLET, FILM COATED ORAL at 12:25

## 2021-01-07 RX ADMIN — Medication 20 MILLIEQUIVALENT(S): at 04:32

## 2021-01-07 RX ADMIN — ALBUTEROL 1 PUFF(S): 90 AEROSOL, METERED ORAL at 09:23

## 2021-01-07 RX ADMIN — OXYCODONE AND ACETAMINOPHEN 1 TABLET(S): 5; 325 TABLET ORAL at 23:10

## 2021-01-07 RX ADMIN — Medication 100 MILLIGRAM(S): at 13:02

## 2021-01-07 RX ADMIN — Medication 100 MILLIGRAM(S): at 06:34

## 2021-01-07 RX ADMIN — Medication 6 MILLIGRAM(S): at 05:01

## 2021-01-07 RX ADMIN — Medication 20 MILLIGRAM(S): at 05:02

## 2021-01-07 RX ADMIN — PANTOPRAZOLE SODIUM 40 MILLIGRAM(S): 20 TABLET, DELAYED RELEASE ORAL at 09:22

## 2021-01-07 RX ADMIN — SERTRALINE 100 MILLIGRAM(S): 25 TABLET, FILM COATED ORAL at 13:02

## 2021-01-07 RX ADMIN — Medication 81 MILLIGRAM(S): at 11:43

## 2021-01-07 RX ADMIN — ENOXAPARIN SODIUM 40 MILLIGRAM(S): 100 INJECTION SUBCUTANEOUS at 11:43

## 2021-01-07 RX ADMIN — Medication 100 MILLIGRAM(S): at 23:09

## 2021-01-07 NOTE — PROGRESS NOTE ADULT - SUBJECTIVE AND OBJECTIVE BOX
HPI:  69 yo female with medical history significant for Anxiety, Asthma, Breast cancer in situ, COPD (chronic obstructive pulmonary disease). DVT (deep venous thrombosis) not presently on A/C, Graves disease, Hyperlipidemia, Hypertension, Hypothyroid, Parathyroidectomy, Kidney cancer, primary, with metastasis from kidney to other site,  Left sided nephrectomy only- no chemo or RT, Obesity, Sleep apnea comes in with dyspnea and fever for 2 days prior to admission.    Pt now satting 97% on 2L.  Has started Decadron.    OVERNIGHT EVENTS:  No new overnight events.     REVIEW OF SYSTEMS:      CONSTITUTIONAL: No fever,   EYES: no acute visual disturbances  NECK: No pain or stiffness  RESPIRATORY: No cough; No shortness of breath  CARDIOVASCULAR: No chest pain, no palpitations  GASTROINTESTINAL: No pain. No nausea, vomiting or diarrhea   NEUROLOGICAL: No headache or numbness, no tremors  MUSCULOSKELETAL: No joint pain, no muscle pain  GENITOURINARY: no dysuria, no frequency, no hesitancy  PSYCHIATRY: no depression , no anxiety  ALL OTHER  ROS negative        Vital Signs Last 24 Hrs  T(C): 36.8 (07 Jan 2021 14:34), Max: 37.9 (06 Jan 2021 18:26)  T(F): 98.2 (07 Jan 2021 14:34), Max: 100.2 (06 Jan 2021 18:26)  HR: 78 (07 Jan 2021 14:34) (72 - 80)  BP: 132/74 (07 Jan 2021 14:34) (110/58 - 133/57)  BP(mean): --  RR: 18 (07 Jan 2021 14:34) (16 - 18)  SpO2: 99% (07 Jan 2021 14:34) (96% - 99%)    ________________________________________________  PHYSICAL EXAM:    GENERAL: NAD  HEENT: Normocephalic; conjunctivae and sclerae clear; moist mucous membranes;   NECK : supple, no JVD  CHEST/LUNG: Clear to auscultation bilaterally   HEART: S1 S2  regular  ABDOMEN: Soft, Nontender, Nondistended; Bowel sounds present  EXTREMITIES: no cyanosis; no LE edema; no calf tenderness  SKIN: warm and dry; no rash  NERVOUS SYSTEM:  Alert & Oriented x3; no new deficits    _________________________________________________  CURRENT MEDICATIONS:    MEDICATIONS  (STANDING):  ALBUTerol    90 MICROgram(s) HFA Inhaler 1 Puff(s) Inhalation every 4 hours  aspirin enteric coated 81 milliGRAM(s) Oral daily  atorvastatin 40 milliGRAM(s) Oral at bedtime  ceFAZolin   IVPB 1000 milliGRAM(s) IV Intermittent every 8 hours  ceFAZolin   IVPB      cholecalciferol 1000 Unit(s) Oral daily  dexAMETHasone  Injectable 6 milliGRAM(s) IV Push daily  enoxaparin Injectable 40 milliGRAM(s) SubCutaneous daily  furosemide    Tablet 20 milliGRAM(s) Oral daily  gabapentin 100 milliGRAM(s) Oral daily  hydrochlorothiazide 25 milliGRAM(s) Oral daily  levothyroxine 125 MICROGram(s) Oral daily  losartan 100 milliGRAM(s) Oral daily  montelukast 10 milliGRAM(s) Oral daily  OLANZapine 10 milliGRAM(s) Oral daily  pantoprazole    Tablet 40 milliGRAM(s) Oral before breakfast  remdesivir  IVPB   IV Intermittent   remdesivir  IVPB 100 milliGRAM(s) IV Intermittent every 24 hours  sertraline 100 milliGRAM(s) Oral daily    MEDICATIONS  (PRN):      __________________________________________________  LABS:                          11.2   4.63  )-----------( 103      ( 07 Jan 2021 08:14 )             35.7     01-07    142  |  106  |  19<H>  ----------------------------<  89  3.6   |  28  |  0.63    Ca    8.7      07 Jan 2021 08:14  Mg     1.8     01-07    TPro  6.8  /  Alb  2.8<L>  /  TBili  0.5  /  DBili  x   /  AST  68<H>  /  ALT  36  /  AlkPhos  62  01-07    PT/INR - ( 06 Jan 2021 10:29 )   PT: 14.4 sec;   INR: 1.22 ratio             CAPILLARY BLOOD GLUCOSE          __________________________________________________  RADIOLOGY & ADDITIONAL TESTS:    Imaging Personally Reviewed:  YES    < from: Xray Chest 1 View-PORTABLE IMMEDIATE (01.06.21 @ 10:49) >  Impression: No active infiltrates. Possible small left pleural effusion or pleural scarring    < end of copied text >    < from: US Duplex Venous Lower Ext Complete, Bilateral (01.06.21 @ 16:36) >  IMPRESSION:  No evidence of deep venous thrombosis in either lower extremity as described above.    < end of copied text >      Consultant(s) Notes Reviewed:   YES     Plan of care was discussed with patient and /or primary care giver; all questions and concerns were addressed and care was aligned with patient's wishes.    Plan discussed with attending and consulting physicians.

## 2021-01-07 NOTE — PROGRESS NOTE ADULT - PROBLEM SELECTOR PLAN 1
+ COVID PCR  CXR noted above  Satting 97% on 2L  Continue with Decadron (Day 2)  Continue with Remdesivir (Day 2)  Continue with Supplemental O2  Continue with supportive care

## 2021-01-07 NOTE — PROGRESS NOTE ADULT - PROBLEM SELECTOR PLAN 5
Controlled  Pt takes Amlodipine 5 mg, Losartan 100 mg, HCTZ 25 mg and Furosemide 20 mg at home  Continue with Losartan, HCTZ and Furosemide with parameters  Amlodipine currently on hold for LE edema, resume when medically appropriate  Continue to monitor BP

## 2021-01-07 NOTE — PROGRESS NOTE ADULT - PROBLEM SELECTOR PLAN 4
Pt with exophthalmus  PSH Parathyroidectomy  Pt takes Prednisone 5 mg at home  Home dose presently on hold secondary to Decadron

## 2021-01-08 LAB
ALBUMIN SERPL ELPH-MCNC: 2.8 G/DL — LOW (ref 3.5–5)
ALP SERPL-CCNC: 60 U/L — SIGNIFICANT CHANGE UP (ref 40–120)
ALT FLD-CCNC: 31 U/L DA — SIGNIFICANT CHANGE UP (ref 10–60)
ANION GAP SERPL CALC-SCNC: 9 MMOL/L — SIGNIFICANT CHANGE UP (ref 5–17)
AST SERPL-CCNC: 64 U/L — HIGH (ref 10–40)
BILIRUB DIRECT SERPL-MCNC: 0.1 MG/DL — SIGNIFICANT CHANGE UP (ref 0–0.2)
BILIRUB INDIRECT FLD-MCNC: 0.4 MG/DL — SIGNIFICANT CHANGE UP (ref 0.2–1)
BILIRUB SERPL-MCNC: 0.5 MG/DL — SIGNIFICANT CHANGE UP (ref 0.2–1.2)
BUN SERPL-MCNC: 18 MG/DL — SIGNIFICANT CHANGE UP (ref 7–18)
CALCIUM SERPL-MCNC: 8.8 MG/DL — SIGNIFICANT CHANGE UP (ref 8.4–10.5)
CHLORIDE SERPL-SCNC: 105 MMOL/L — SIGNIFICANT CHANGE UP (ref 96–108)
CO2 SERPL-SCNC: 29 MMOL/L — SIGNIFICANT CHANGE UP (ref 22–31)
CREAT SERPL-MCNC: 0.67 MG/DL — SIGNIFICANT CHANGE UP (ref 0.5–1.3)
GLUCOSE BLDC GLUCOMTR-MCNC: 111 MG/DL — HIGH (ref 70–99)
GLUCOSE BLDC GLUCOMTR-MCNC: 127 MG/DL — HIGH (ref 70–99)
GLUCOSE BLDC GLUCOMTR-MCNC: 131 MG/DL — HIGH (ref 70–99)
GLUCOSE BLDC GLUCOMTR-MCNC: 95 MG/DL — SIGNIFICANT CHANGE UP (ref 70–99)
GLUCOSE SERPL-MCNC: 75 MG/DL — SIGNIFICANT CHANGE UP (ref 70–99)
HCT VFR BLD CALC: 37.2 % — SIGNIFICANT CHANGE UP (ref 34.5–45)
HGB BLD-MCNC: 11.5 G/DL — SIGNIFICANT CHANGE UP (ref 11.5–15.5)
MAGNESIUM SERPL-MCNC: 1.8 MG/DL — SIGNIFICANT CHANGE UP (ref 1.6–2.6)
MCHC RBC-ENTMCNC: 27.6 PG — SIGNIFICANT CHANGE UP (ref 27–34)
MCHC RBC-ENTMCNC: 30.9 GM/DL — LOW (ref 32–36)
MCV RBC AUTO: 89.4 FL — SIGNIFICANT CHANGE UP (ref 80–100)
NRBC # BLD: 0 /100 WBCS — SIGNIFICANT CHANGE UP (ref 0–0)
PHOSPHATE SERPL-MCNC: 2.2 MG/DL — LOW (ref 2.5–4.5)
PLATELET # BLD AUTO: 131 K/UL — LOW (ref 150–400)
POTASSIUM SERPL-MCNC: 3.4 MMOL/L — LOW (ref 3.5–5.3)
POTASSIUM SERPL-SCNC: 3.4 MMOL/L — LOW (ref 3.5–5.3)
PROT SERPL-MCNC: 6.6 G/DL — SIGNIFICANT CHANGE UP (ref 6–8.3)
RBC # BLD: 4.16 M/UL — SIGNIFICANT CHANGE UP (ref 3.8–5.2)
RBC # FLD: 15.8 % — HIGH (ref 10.3–14.5)
SARS-COV-2 IGG SERPL QL IA: POSITIVE
SARS-COV-2 IGM SERPL IA-ACNC: 1.2 RATIO — HIGH
SODIUM SERPL-SCNC: 143 MMOL/L — SIGNIFICANT CHANGE UP (ref 135–145)
WBC # BLD: 4.7 K/UL — SIGNIFICANT CHANGE UP (ref 3.8–10.5)
WBC # FLD AUTO: 4.7 K/UL — SIGNIFICANT CHANGE UP (ref 3.8–10.5)

## 2021-01-08 RX ORDER — NYSTATIN CREAM 100000 [USP'U]/G
1 CREAM TOPICAL
Refills: 0 | Status: DISCONTINUED | OUTPATIENT
Start: 2021-01-08 | End: 2021-01-13

## 2021-01-08 RX ADMIN — Medication 20 MILLIGRAM(S): at 06:15

## 2021-01-08 RX ADMIN — ALBUTEROL 1 PUFF(S): 90 AEROSOL, METERED ORAL at 22:39

## 2021-01-08 RX ADMIN — GABAPENTIN 100 MILLIGRAM(S): 400 CAPSULE ORAL at 12:43

## 2021-01-08 RX ADMIN — ALBUTEROL 1 PUFF(S): 90 AEROSOL, METERED ORAL at 06:15

## 2021-01-08 RX ADMIN — NYSTATIN CREAM 1 APPLICATION(S): 100000 CREAM TOPICAL at 16:08

## 2021-01-08 RX ADMIN — Medication 25 MILLIGRAM(S): at 06:16

## 2021-01-08 RX ADMIN — LOSARTAN POTASSIUM 100 MILLIGRAM(S): 100 TABLET, FILM COATED ORAL at 06:15

## 2021-01-08 RX ADMIN — ALBUTEROL 1 PUFF(S): 90 AEROSOL, METERED ORAL at 16:08

## 2021-01-08 RX ADMIN — SERTRALINE 100 MILLIGRAM(S): 25 TABLET, FILM COATED ORAL at 12:38

## 2021-01-08 RX ADMIN — ENOXAPARIN SODIUM 40 MILLIGRAM(S): 100 INJECTION SUBCUTANEOUS at 12:38

## 2021-01-08 RX ADMIN — Medication 100 MILLIGRAM(S): at 06:15

## 2021-01-08 RX ADMIN — OLANZAPINE 10 MILLIGRAM(S): 15 TABLET, FILM COATED ORAL at 12:38

## 2021-01-08 RX ADMIN — ATORVASTATIN CALCIUM 40 MILLIGRAM(S): 80 TABLET, FILM COATED ORAL at 22:39

## 2021-01-08 RX ADMIN — ALBUTEROL 1 PUFF(S): 90 AEROSOL, METERED ORAL at 11:45

## 2021-01-08 RX ADMIN — Medication 100 MILLIGRAM(S): at 22:39

## 2021-01-08 RX ADMIN — TIOTROPIUM BROMIDE 1 CAPSULE(S): 18 CAPSULE ORAL; RESPIRATORY (INHALATION) at 12:39

## 2021-01-08 RX ADMIN — PANTOPRAZOLE SODIUM 40 MILLIGRAM(S): 20 TABLET, DELAYED RELEASE ORAL at 06:17

## 2021-01-08 RX ADMIN — Medication 81 MILLIGRAM(S): at 12:38

## 2021-01-08 RX ADMIN — Medication 100 MILLIGRAM(S): at 12:39

## 2021-01-08 RX ADMIN — Medication 125 MICROGRAM(S): at 06:15

## 2021-01-08 RX ADMIN — Medication 1000 UNIT(S): at 12:38

## 2021-01-08 RX ADMIN — MONTELUKAST 10 MILLIGRAM(S): 4 TABLET, CHEWABLE ORAL at 12:59

## 2021-01-08 RX ADMIN — Medication 6 MILLIGRAM(S): at 06:17

## 2021-01-08 NOTE — PATIENT PROFILE ADULT - STATED REASON FOR ADMISSION
MEDICAL EXCUSE FOR SCHOOL    February 14, 2017       Re: Andrey Ruano   [address]  [place] [address]      This is to certify that Andrey Ruano has been under my care from 2/14/2017 and is to be excused from school from 2/14/2017 thu 2/15/2017.      REMARKS: Andrey Ruano may return to school sooner if able.         SIGNATURE:___________________________________________,   2/14/2017  Tristan Phillips MD        [facility]  [facility]  [address] [place] [address]  [phone]      
covid

## 2021-01-08 NOTE — PROGRESS NOTE ADULT - PROBLEM SELECTOR PLAN 1
Presented with dyspnea  + COVID PCR, antibody positive  CXR noted above  Satting 97% on 2L  C/w Decadron (Day 3)  D/c  Remdesivir as antibody positive  C/w Supplemental O2  C/w supportive care  monitor inflammatory markers.  c/w isolation

## 2021-01-08 NOTE — PROGRESS NOTE ADULT - PROBLEM SELECTOR PLAN 3
US noted above  Pt with mild erythema and pain  Continue with cefazolin (day 2) US noted above  Pt with mild erythema and pain  Continue with cefazolin (day 3/5)

## 2021-01-08 NOTE — PROGRESS NOTE ADULT - SUBJECTIVE AND OBJECTIVE BOX
Patient is a 70y old  Female who presents with a chief complaint of Dyspnea (07 Jan 2021 19:16)    INTERVAL HPI/OVERNIGHT EVENTS: no new complaints    REVIEW OF SYSTEMS:  CONSTITUTIONAL: No fever, chills  ENMT:  No difficulty hearing, no change in vision  NECK: No pain or stiffness  RESPIRATORY: No cough, SOB  CARDIOVASCULAR: No chest pain, palpitations  GASTROINTESTINAL: No abdominal pain. No nausea, vomiting, or diarrhea  GENITOURINARY: No dysuria  NEUROLOGICAL: No HA  SKIN: No itching, burning, rashes, or lesions   LYMPH NODES: No enlarged glands  ENDOCRINE: No heat or cold intolerance; No hair loss  MUSCULOSKELETAL: No joint pain or swelling; No muscle, back, or extremity pain  PSYCHIATRIC: No depression, anxiety  HEME/LYMPH: No easy bruising, or bleeding gums    T(C): 37.1 (01-08-21 @ 05:26), Max: 37.1 (01-08-21 @ 05:26)  HR: 75 (01-08-21 @ 05:26) (73 - 78)  BP: 123/87 (01-08-21 @ 05:26) (123/87 - 138/66)  RR: 18 (01-08-21 @ 05:26) (18 - 18)  SpO2: 95% (01-08-21 @ 05:26) (95% - 99%)  Wt(kg): --Vital Signs Last 24 Hrs  T(C): 37.1 (08 Jan 2021 05:26), Max: 37.1 (08 Jan 2021 05:26)  T(F): 98.7 (08 Jan 2021 05:26), Max: 98.7 (08 Jan 2021 05:26)  HR: 75 (08 Jan 2021 05:26) (73 - 78)  BP: 123/87 (08 Jan 2021 05:26) (123/87 - 138/66)  BP(mean): --  RR: 18 (08 Jan 2021 05:26) (18 - 18)  SpO2: 95% (08 Jan 2021 05:26) (95% - 99%)    MEDICATIONS  (STANDING):  ALBUTerol    90 MICROgram(s) HFA Inhaler 1 Puff(s) Inhalation every 4 hours  aspirin enteric coated 81 milliGRAM(s) Oral daily  atorvastatin 40 milliGRAM(s) Oral at bedtime  ceFAZolin   IVPB 1000 milliGRAM(s) IV Intermittent every 8 hours  ceFAZolin   IVPB      cholecalciferol 1000 Unit(s) Oral daily  dexAMETHasone  Injectable 6 milliGRAM(s) IV Push daily  enoxaparin Injectable 40 milliGRAM(s) SubCutaneous daily  furosemide    Tablet 20 milliGRAM(s) Oral daily  gabapentin 100 milliGRAM(s) Oral daily  hydrochlorothiazide 25 milliGRAM(s) Oral daily  levothyroxine 125 MICROGram(s) Oral daily  losartan 100 milliGRAM(s) Oral daily  montelukast 10 milliGRAM(s) Oral daily  nystatin Powder 1 Application(s) Topical two times a day  OLANZapine 10 milliGRAM(s) Oral daily  pantoprazole    Tablet 40 milliGRAM(s) Oral before breakfast  remdesivir  IVPB 100 milliGRAM(s) IV Intermittent every 24 hours  remdesivir  IVPB   IV Intermittent   sertraline 100 milliGRAM(s) Oral daily  tiotropium 18 MICROgram(s) Capsule 1 Capsule(s) Inhalation daily    MEDICATIONS  (PRN):    PHYSICAL EXAM:  GENERAL: NAD  EYES: clear conjunctiva; EOMI  ENMT: Moist mucous membranes  NECK: Supple, No JVD, Normal thyroid  CHEST/LUNG: Clear to auscultation bilaterally; No rales, rhonchi, wheezing, or rubs, R under breast rash  HEART: S1, S2, Regular rate and rhythm  ABDOMEN: Soft, Nontender, Nondistended; Bowel sounds present  NEURO: Alert & Oriented X3  EXTREMITIES: +3 edema and erythema BLE, no calf tenderness,   LYMPH: No lymphadenopathy noted  SKIN: No rashes or lesions    Consultant(s) Notes Reviewed:  [x ] YES  [ ] NO  Care Discussed with Consultants/Other Providers [ x] YES  [ ] NO    LABS:                        11.5   4.70  )-----------( 131      ( 08 Jan 2021 07:24 )             37.2     01-08    143  |  105  |  18  ----------------------------<  75  3.4<L>   |  29  |  0.67    Ca    8.8      08 Jan 2021 07:24  Phos  2.2     01-08  Mg     1.8     01-08    TPro  6.6  /  Alb  2.8<L>  /  TBili  0.5  /  DBili  0.1  /  AST  64<H>  /  ALT  31  /  AlkPhos  60  01-08    PT/INR - ( 06 Jan 2021 10:29 )   PT: 14.4 sec;   INR: 1.22 ratio       CAPILLARY BLOOD GLUCOSE  POCT Blood Glucose.: 95 mg/dL (08 Jan 2021 08:17)    RADIOLOGY & ADDITIONAL TESTS:    Imaging Personally Reviewed:  [x ] YES  [ ] NO  < from: US Duplex Venous Lower Ext Complete, Bilateral (01.06.21 @ 16:36) >  EXAM:  US DPLX LWR EXT VEINS COMPL BI                            PROCEDURE DATE:  01/06/2021          INTERPRETATION:  CLINICAL INFORMATION: Lower extremity swelling. Covid positive.    COMPARISON: 11/11/2019.    TECHNIQUE: Duplex sonography of theBILATERAL LOWER extremity veins with color and spectral Doppler, with and without compression.    FINDINGS:    There is normal compressibility of the bilateral common femoral, femoral and popliteal veins.  Doppler examination shows normal spontaneousand phasic flow.    No calf vein thrombosis is detected. Visualization of the calf veins is limited due to soft tissue edema and hence if needed follow-up imaging can be obtained in 5-7 days.    IMPRESSION:  No evidence of deep venous thrombosis in either lower extremity as described above.      < end of copied text >  < from: Xray Chest 1 View-PORTABLE IMMEDIATE (01.06.21 @ 10:49) >    EXAM:  XR CHEST PORTABLE IMMED 1V                            PROCEDURE DATE:  01/06/2021          INTERPRETATION:  Fever.    AP chest. Prior 12/14/2019.    No change heart mediastinum. Blunted left costophrenic angle may represent pleural scarring or small left pleural effusion. No focal consolidation. Partially visualized surgical hardware cervicothoracic region similar to prior. Old bilateral rib fractures    Impression: No active infiltrates. Possible small left pleural effusion or pleural scarring      ABDOULAYE ALLRED MD; Attending Radiologist  This document has been electronically signed. Jan 6 2021 10:55AM    < end of copied text >

## 2021-01-09 LAB
ALBUMIN SERPL ELPH-MCNC: 3 G/DL — LOW (ref 3.5–5)
ALBUMIN SERPL ELPH-MCNC: 3 G/DL — LOW (ref 3.5–5)
ALP SERPL-CCNC: 66 U/L — SIGNIFICANT CHANGE UP (ref 40–120)
ALP SERPL-CCNC: 68 U/L — SIGNIFICANT CHANGE UP (ref 40–120)
ALT FLD-CCNC: 29 U/L DA — SIGNIFICANT CHANGE UP (ref 10–60)
ALT FLD-CCNC: 29 U/L DA — SIGNIFICANT CHANGE UP (ref 10–60)
ANION GAP SERPL CALC-SCNC: 9 MMOL/L — SIGNIFICANT CHANGE UP (ref 5–17)
AST SERPL-CCNC: 49 U/L — HIGH (ref 10–40)
AST SERPL-CCNC: 49 U/L — HIGH (ref 10–40)
BASOPHILS # BLD AUTO: 0 K/UL — SIGNIFICANT CHANGE UP (ref 0–0.2)
BASOPHILS NFR BLD AUTO: 0 % — SIGNIFICANT CHANGE UP (ref 0–2)
BILIRUB DIRECT SERPL-MCNC: 0.1 MG/DL — SIGNIFICANT CHANGE UP (ref 0–0.2)
BILIRUB INDIRECT FLD-MCNC: 0.6 MG/DL — SIGNIFICANT CHANGE UP (ref 0.2–1)
BILIRUB SERPL-MCNC: 0.7 MG/DL — SIGNIFICANT CHANGE UP (ref 0.2–1.2)
BILIRUB SERPL-MCNC: 0.8 MG/DL — SIGNIFICANT CHANGE UP (ref 0.2–1.2)
BUN SERPL-MCNC: 17 MG/DL — SIGNIFICANT CHANGE UP (ref 7–18)
CALCIUM SERPL-MCNC: 9.5 MG/DL — SIGNIFICANT CHANGE UP (ref 8.4–10.5)
CHLORIDE SERPL-SCNC: 105 MMOL/L — SIGNIFICANT CHANGE UP (ref 96–108)
CO2 SERPL-SCNC: 31 MMOL/L — SIGNIFICANT CHANGE UP (ref 22–31)
CREAT SERPL-MCNC: 0.64 MG/DL — SIGNIFICANT CHANGE UP (ref 0.5–1.3)
EOSINOPHIL # BLD AUTO: 0 K/UL — SIGNIFICANT CHANGE UP (ref 0–0.5)
EOSINOPHIL NFR BLD AUTO: 0 % — SIGNIFICANT CHANGE UP (ref 0–6)
GLUCOSE BLDC GLUCOMTR-MCNC: 110 MG/DL — HIGH (ref 70–99)
GLUCOSE BLDC GLUCOMTR-MCNC: 127 MG/DL — HIGH (ref 70–99)
GLUCOSE BLDC GLUCOMTR-MCNC: 136 MG/DL — HIGH (ref 70–99)
GLUCOSE SERPL-MCNC: 105 MG/DL — HIGH (ref 70–99)
HCT VFR BLD CALC: 39.8 % — SIGNIFICANT CHANGE UP (ref 34.5–45)
HGB BLD-MCNC: 12.5 G/DL — SIGNIFICANT CHANGE UP (ref 11.5–15.5)
IMM GRANULOCYTES NFR BLD AUTO: 0.4 % — SIGNIFICANT CHANGE UP (ref 0–1.5)
LYMPHOCYTES # BLD AUTO: 0.76 K/UL — LOW (ref 1–3.3)
LYMPHOCYTES # BLD AUTO: 15.2 % — SIGNIFICANT CHANGE UP (ref 13–44)
MCHC RBC-ENTMCNC: 28 PG — SIGNIFICANT CHANGE UP (ref 27–34)
MCHC RBC-ENTMCNC: 31.4 GM/DL — LOW (ref 32–36)
MCV RBC AUTO: 89 FL — SIGNIFICANT CHANGE UP (ref 80–100)
MONOCYTES # BLD AUTO: 0.39 K/UL — SIGNIFICANT CHANGE UP (ref 0–0.9)
MONOCYTES NFR BLD AUTO: 7.8 % — SIGNIFICANT CHANGE UP (ref 2–14)
NEUTROPHILS # BLD AUTO: 3.83 K/UL — SIGNIFICANT CHANGE UP (ref 1.8–7.4)
NEUTROPHILS NFR BLD AUTO: 76.6 % — SIGNIFICANT CHANGE UP (ref 43–77)
NRBC # BLD: 0 /100 WBCS — SIGNIFICANT CHANGE UP (ref 0–0)
PLATELET # BLD AUTO: 149 K/UL — LOW (ref 150–400)
POTASSIUM SERPL-MCNC: 3.2 MMOL/L — LOW (ref 3.5–5.3)
POTASSIUM SERPL-SCNC: 3.2 MMOL/L — LOW (ref 3.5–5.3)
PROT SERPL-MCNC: 7.1 G/DL — SIGNIFICANT CHANGE UP (ref 6–8.3)
PROT SERPL-MCNC: 7.1 G/DL — SIGNIFICANT CHANGE UP (ref 6–8.3)
RBC # BLD: 4.47 M/UL — SIGNIFICANT CHANGE UP (ref 3.8–5.2)
RBC # FLD: 15.6 % — HIGH (ref 10.3–14.5)
SODIUM SERPL-SCNC: 145 MMOL/L — SIGNIFICANT CHANGE UP (ref 135–145)
WBC # BLD: 5 K/UL — SIGNIFICANT CHANGE UP (ref 3.8–10.5)
WBC # FLD AUTO: 5 K/UL — SIGNIFICANT CHANGE UP (ref 3.8–10.5)

## 2021-01-09 RX ORDER — POTASSIUM CHLORIDE 20 MEQ
40 PACKET (EA) ORAL ONCE
Refills: 0 | Status: COMPLETED | OUTPATIENT
Start: 2021-01-09 | End: 2021-01-09

## 2021-01-09 RX ADMIN — Medication 6 MILLIGRAM(S): at 05:06

## 2021-01-09 RX ADMIN — TIOTROPIUM BROMIDE 1 CAPSULE(S): 18 CAPSULE ORAL; RESPIRATORY (INHALATION) at 12:45

## 2021-01-09 RX ADMIN — SERTRALINE 100 MILLIGRAM(S): 25 TABLET, FILM COATED ORAL at 12:44

## 2021-01-09 RX ADMIN — MONTELUKAST 10 MILLIGRAM(S): 4 TABLET, CHEWABLE ORAL at 12:44

## 2021-01-09 RX ADMIN — Medication 100 MILLIGRAM(S): at 21:18

## 2021-01-09 RX ADMIN — GABAPENTIN 100 MILLIGRAM(S): 400 CAPSULE ORAL at 12:51

## 2021-01-09 RX ADMIN — ALBUTEROL 1 PUFF(S): 90 AEROSOL, METERED ORAL at 15:36

## 2021-01-09 RX ADMIN — ALBUTEROL 1 PUFF(S): 90 AEROSOL, METERED ORAL at 11:12

## 2021-01-09 RX ADMIN — ALBUTEROL 1 PUFF(S): 90 AEROSOL, METERED ORAL at 02:06

## 2021-01-09 RX ADMIN — ALBUTEROL 1 PUFF(S): 90 AEROSOL, METERED ORAL at 05:08

## 2021-01-09 RX ADMIN — Medication 125 MICROGRAM(S): at 05:10

## 2021-01-09 RX ADMIN — ALBUTEROL 1 PUFF(S): 90 AEROSOL, METERED ORAL at 21:18

## 2021-01-09 RX ADMIN — Medication 100 MILLIGRAM(S): at 05:06

## 2021-01-09 RX ADMIN — OLANZAPINE 10 MILLIGRAM(S): 15 TABLET, FILM COATED ORAL at 12:44

## 2021-01-09 RX ADMIN — LOSARTAN POTASSIUM 100 MILLIGRAM(S): 100 TABLET, FILM COATED ORAL at 05:07

## 2021-01-09 RX ADMIN — NYSTATIN CREAM 1 APPLICATION(S): 100000 CREAM TOPICAL at 05:07

## 2021-01-09 RX ADMIN — Medication 40 MILLIEQUIVALENT(S): at 12:50

## 2021-01-09 RX ADMIN — ATORVASTATIN CALCIUM 40 MILLIGRAM(S): 80 TABLET, FILM COATED ORAL at 21:18

## 2021-01-09 RX ADMIN — Medication 20 MILLIGRAM(S): at 05:06

## 2021-01-09 RX ADMIN — Medication 25 MILLIGRAM(S): at 05:07

## 2021-01-09 RX ADMIN — PANTOPRAZOLE SODIUM 40 MILLIGRAM(S): 20 TABLET, DELAYED RELEASE ORAL at 05:07

## 2021-01-09 RX ADMIN — Medication 1000 UNIT(S): at 12:44

## 2021-01-09 RX ADMIN — Medication 81 MILLIGRAM(S): at 12:44

## 2021-01-09 RX ADMIN — Medication 100 MILLIGRAM(S): at 15:36

## 2021-01-09 RX ADMIN — ENOXAPARIN SODIUM 40 MILLIGRAM(S): 100 INJECTION SUBCUTANEOUS at 12:44

## 2021-01-09 RX ADMIN — NYSTATIN CREAM 1 APPLICATION(S): 100000 CREAM TOPICAL at 17:52

## 2021-01-09 NOTE — PHYSICAL THERAPY INITIAL EVALUATION ADULT - ADDITIONAL COMMENTS
Patient lives in a building apartment with elevator access; lives with her  and son, has HHA; wheelchair bound for 6 years; able to perform ADLs with max assist; requires max A to perform sit to stand transfer.

## 2021-01-09 NOTE — PROGRESS NOTE ADULT - SUBJECTIVE AND OBJECTIVE BOX
HPI:  69 yo female with medical history significant for Anxiety, Asthma, Breast cancer in situ, COPD (chronic obstructive pulmonary disease). DVT (deep venous thrombosis) not presently on A/C, Graves disease, Hyperlipidemia, Hypertension, Hypothyroid, Parathyroidectomy, Kidney cancer, primary, with metastasis from kidney to other site,  LEft sided nephrectomy only- no chemo or RT, Obesity, Sleep apnea comes in  with dyspnea and fever for past 2 days.   She states that she has been feeling feverish and has chills with it. Did not measure temperature. Also has dyspnea more on exertion, progressive, associated with mild cough, whitish sputum.   Her  was tested positive for covid and is sick with similar symptoms. pt's son also coughing per pt   Denies abdominal pain, nausea, vomiting, diarrhea, chest pain, urinary symptoms.   She also has lower extremity edema which is chronic, but has been painful for past few days.  (06 Jan 2021 12:59)      Patient is a 70y old  Female who presents with a chief complaint of Dyspnea (08 Jan 2021 12:46)      INTERVAL HPI/OVERNIGHT EVENTS:  T(C): 36.9 (01-09-21 @ 14:15), Max: 37.2 (01-08-21 @ 21:43)  HR: 66 (01-09-21 @ 14:15) (61 - 67)  BP: 112/77 (01-09-21 @ 14:15) (112/77 - 137/64)  RR: 18 (01-09-21 @ 14:15) (18 - 18)  SpO2: 94% (01-09-21 @ 14:15) (94% - 95%)  Wt(kg): --  I&O's Summary      REVIEW OF SYSTEMS: denies fever, chills, SOB, palpitations, chest pain, abdominal pain, nausea, vomitting, diarrhea, constipation, dizziness    MEDICATIONS  (STANDING):  ALBUTerol    90 MICROgram(s) HFA Inhaler 1 Puff(s) Inhalation every 4 hours  aspirin enteric coated 81 milliGRAM(s) Oral daily  atorvastatin 40 milliGRAM(s) Oral at bedtime  ceFAZolin   IVPB 1000 milliGRAM(s) IV Intermittent every 8 hours  ceFAZolin   IVPB      cholecalciferol 1000 Unit(s) Oral daily  dexAMETHasone  Injectable 6 milliGRAM(s) IV Push daily  enoxaparin Injectable 40 milliGRAM(s) SubCutaneous daily  furosemide    Tablet 20 milliGRAM(s) Oral daily  gabapentin 100 milliGRAM(s) Oral daily  hydrochlorothiazide 25 milliGRAM(s) Oral daily  levothyroxine 125 MICROGram(s) Oral daily  losartan 100 milliGRAM(s) Oral daily  montelukast 10 milliGRAM(s) Oral daily  nystatin Powder 1 Application(s) Topical two times a day  OLANZapine 10 milliGRAM(s) Oral daily  pantoprazole    Tablet 40 milliGRAM(s) Oral before breakfast  sertraline 100 milliGRAM(s) Oral daily  tiotropium 18 MICROgram(s) Capsule 1 Capsule(s) Inhalation daily    MEDICATIONS  (PRN):      PHYSICAL EXAM:  GENERAL: NAD, well-groomed, well-developed  HEAD:  Atraumatic, Normocephalic  EYES: EOMI, PERRLA, conjunctiva and sclera clear  ENMT: No tonsillar erythema, exudates, or enlargement; Moist mucous membranes, Good dentition, No lesions  NECK: Supple, No JVD, Normal thyroid  NERVOUS SYSTEM:  Alert & Oriented X3, Good concentration; Motor Strength 5/5 B/L upper and lower extremities; DTRs 2+ intact and symmetric  CHEST/LUNG: Clear to percussion bilaterally; No rales, rhonchi, wheezing, or rubs  HEART: Regular rate and rhythm; No murmurs, rubs, or gallops  ABDOMEN: Soft, Nontender, Nondistended; Bowel sounds present  EXTREMITIES:  2+ Peripheral Pulses, No clubbing, cyanosis, or edema  LYMPH: No lymphadenopathy noted  SKIN: No rashes or lesions  LABS:                        12.5   5.00  )-----------( 149      ( 09 Jan 2021 07:51 )             39.8     01-09    145  |  105  |  17  ----------------------------<  105<H>  3.2<L>   |  31  |  0.64    Ca    9.5      09 Jan 2021 07:51  Phos  2.2     01-08  Mg     1.8     01-08    TPro  7.1  /  Alb  3.0<L>  /  TBili  0.8  /  DBili  0.1  /  AST  49<H>  /  ALT  29  /  AlkPhos  66  01-09        CAPILLARY BLOOD GLUCOSE      POCT Blood Glucose.: 127 mg/dL (09 Jan 2021 17:00)  POCT Blood Glucose.: 136 mg/dL (09 Jan 2021 11:33)

## 2021-01-09 NOTE — PHYSICAL THERAPY INITIAL EVALUATION ADULT - CRITERIA FOR SKILLED THERAPEUTIC INTERVENTIONS
impairments found/functional limitations in following categories/risk reduction/prevention/predicted duration of therapy intervention/anticipated discharge recommendation

## 2021-01-10 LAB
ALBUMIN SERPL ELPH-MCNC: 2.9 G/DL — LOW (ref 3.5–5)
ALBUMIN SERPL ELPH-MCNC: 3 G/DL — LOW (ref 3.5–5)
ALP SERPL-CCNC: 70 U/L — SIGNIFICANT CHANGE UP (ref 40–120)
ALP SERPL-CCNC: 72 U/L — SIGNIFICANT CHANGE UP (ref 40–120)
ALT FLD-CCNC: 23 U/L DA — SIGNIFICANT CHANGE UP (ref 10–60)
ALT FLD-CCNC: 24 U/L DA — SIGNIFICANT CHANGE UP (ref 10–60)
ANION GAP SERPL CALC-SCNC: 8 MMOL/L — SIGNIFICANT CHANGE UP (ref 5–17)
AST SERPL-CCNC: 33 U/L — SIGNIFICANT CHANGE UP (ref 10–40)
AST SERPL-CCNC: 35 U/L — SIGNIFICANT CHANGE UP (ref 10–40)
BASOPHILS # BLD AUTO: 0 K/UL — SIGNIFICANT CHANGE UP (ref 0–0.2)
BASOPHILS NFR BLD AUTO: 0 % — SIGNIFICANT CHANGE UP (ref 0–2)
BILIRUB DIRECT SERPL-MCNC: 0.2 MG/DL — SIGNIFICANT CHANGE UP (ref 0–0.2)
BILIRUB INDIRECT FLD-MCNC: 0.6 MG/DL — SIGNIFICANT CHANGE UP (ref 0.2–1)
BILIRUB SERPL-MCNC: 0.8 MG/DL — SIGNIFICANT CHANGE UP (ref 0.2–1.2)
BILIRUB SERPL-MCNC: 0.8 MG/DL — SIGNIFICANT CHANGE UP (ref 0.2–1.2)
BUN SERPL-MCNC: 16 MG/DL — SIGNIFICANT CHANGE UP (ref 7–18)
CALCIUM SERPL-MCNC: 9.3 MG/DL — SIGNIFICANT CHANGE UP (ref 8.4–10.5)
CHLORIDE SERPL-SCNC: 106 MMOL/L — SIGNIFICANT CHANGE UP (ref 96–108)
CO2 SERPL-SCNC: 31 MMOL/L — SIGNIFICANT CHANGE UP (ref 22–31)
CREAT SERPL-MCNC: 0.63 MG/DL — SIGNIFICANT CHANGE UP (ref 0.5–1.3)
EOSINOPHIL # BLD AUTO: 0.01 K/UL — SIGNIFICANT CHANGE UP (ref 0–0.5)
EOSINOPHIL NFR BLD AUTO: 0.2 % — SIGNIFICANT CHANGE UP (ref 0–6)
GLUCOSE BLDC GLUCOMTR-MCNC: 120 MG/DL — HIGH (ref 70–99)
GLUCOSE BLDC GLUCOMTR-MCNC: 128 MG/DL — HIGH (ref 70–99)
GLUCOSE BLDC GLUCOMTR-MCNC: 138 MG/DL — HIGH (ref 70–99)
GLUCOSE SERPL-MCNC: 115 MG/DL — HIGH (ref 70–99)
HCT VFR BLD CALC: 40 % — SIGNIFICANT CHANGE UP (ref 34.5–45)
HGB BLD-MCNC: 12.4 G/DL — SIGNIFICANT CHANGE UP (ref 11.5–15.5)
IMM GRANULOCYTES NFR BLD AUTO: 0.5 % — SIGNIFICANT CHANGE UP (ref 0–1.5)
LYMPHOCYTES # BLD AUTO: 1.14 K/UL — SIGNIFICANT CHANGE UP (ref 1–3.3)
LYMPHOCYTES # BLD AUTO: 18.8 % — SIGNIFICANT CHANGE UP (ref 13–44)
MCHC RBC-ENTMCNC: 27.7 PG — SIGNIFICANT CHANGE UP (ref 27–34)
MCHC RBC-ENTMCNC: 31 GM/DL — LOW (ref 32–36)
MCV RBC AUTO: 89.3 FL — SIGNIFICANT CHANGE UP (ref 80–100)
MONOCYTES # BLD AUTO: 0.48 K/UL — SIGNIFICANT CHANGE UP (ref 0–0.9)
MONOCYTES NFR BLD AUTO: 7.9 % — SIGNIFICANT CHANGE UP (ref 2–14)
NEUTROPHILS # BLD AUTO: 4.42 K/UL — SIGNIFICANT CHANGE UP (ref 1.8–7.4)
NEUTROPHILS NFR BLD AUTO: 72.6 % — SIGNIFICANT CHANGE UP (ref 43–77)
NRBC # BLD: 0 /100 WBCS — SIGNIFICANT CHANGE UP (ref 0–0)
PLATELET # BLD AUTO: 171 K/UL — SIGNIFICANT CHANGE UP (ref 150–400)
POTASSIUM SERPL-MCNC: 3.1 MMOL/L — LOW (ref 3.5–5.3)
POTASSIUM SERPL-SCNC: 3.1 MMOL/L — LOW (ref 3.5–5.3)
PROT SERPL-MCNC: 7.1 G/DL — SIGNIFICANT CHANGE UP (ref 6–8.3)
PROT SERPL-MCNC: 7.1 G/DL — SIGNIFICANT CHANGE UP (ref 6–8.3)
RBC # BLD: 4.48 M/UL — SIGNIFICANT CHANGE UP (ref 3.8–5.2)
RBC # FLD: 15.4 % — HIGH (ref 10.3–14.5)
SODIUM SERPL-SCNC: 145 MMOL/L — SIGNIFICANT CHANGE UP (ref 135–145)
WBC # BLD: 6.08 K/UL — SIGNIFICANT CHANGE UP (ref 3.8–10.5)
WBC # FLD AUTO: 6.08 K/UL — SIGNIFICANT CHANGE UP (ref 3.8–10.5)

## 2021-01-10 RX ADMIN — GABAPENTIN 100 MILLIGRAM(S): 400 CAPSULE ORAL at 12:31

## 2021-01-10 RX ADMIN — Medication 100 MILLIGRAM(S): at 12:31

## 2021-01-10 RX ADMIN — ALBUTEROL 1 PUFF(S): 90 AEROSOL, METERED ORAL at 22:17

## 2021-01-10 RX ADMIN — Medication 81 MILLIGRAM(S): at 12:28

## 2021-01-10 RX ADMIN — ALBUTEROL 1 PUFF(S): 90 AEROSOL, METERED ORAL at 08:45

## 2021-01-10 RX ADMIN — ENOXAPARIN SODIUM 40 MILLIGRAM(S): 100 INJECTION SUBCUTANEOUS at 12:29

## 2021-01-10 RX ADMIN — Medication 100 MILLIGRAM(S): at 05:06

## 2021-01-10 RX ADMIN — ATORVASTATIN CALCIUM 40 MILLIGRAM(S): 80 TABLET, FILM COATED ORAL at 22:18

## 2021-01-10 RX ADMIN — Medication 20 MILLIGRAM(S): at 05:07

## 2021-01-10 RX ADMIN — Medication 1000 UNIT(S): at 12:28

## 2021-01-10 RX ADMIN — ALBUTEROL 1 PUFF(S): 90 AEROSOL, METERED ORAL at 05:06

## 2021-01-10 RX ADMIN — NYSTATIN CREAM 1 APPLICATION(S): 100000 CREAM TOPICAL at 17:24

## 2021-01-10 RX ADMIN — Medication 25 MILLIGRAM(S): at 05:06

## 2021-01-10 RX ADMIN — OLANZAPINE 10 MILLIGRAM(S): 15 TABLET, FILM COATED ORAL at 12:28

## 2021-01-10 RX ADMIN — TIOTROPIUM BROMIDE 1 CAPSULE(S): 18 CAPSULE ORAL; RESPIRATORY (INHALATION) at 12:28

## 2021-01-10 RX ADMIN — ALBUTEROL 1 PUFF(S): 90 AEROSOL, METERED ORAL at 12:31

## 2021-01-10 RX ADMIN — MONTELUKAST 10 MILLIGRAM(S): 4 TABLET, CHEWABLE ORAL at 12:28

## 2021-01-10 RX ADMIN — Medication 6 MILLIGRAM(S): at 05:07

## 2021-01-10 RX ADMIN — Medication 125 MICROGRAM(S): at 05:07

## 2021-01-10 RX ADMIN — ALBUTEROL 1 PUFF(S): 90 AEROSOL, METERED ORAL at 17:23

## 2021-01-10 RX ADMIN — LOSARTAN POTASSIUM 100 MILLIGRAM(S): 100 TABLET, FILM COATED ORAL at 05:07

## 2021-01-10 RX ADMIN — SERTRALINE 100 MILLIGRAM(S): 25 TABLET, FILM COATED ORAL at 12:28

## 2021-01-11 DIAGNOSIS — E87.6 HYPOKALEMIA: ICD-10-CM

## 2021-01-11 LAB
ALBUMIN SERPL ELPH-MCNC: 2.9 G/DL — LOW (ref 3.5–5)
ALBUMIN SERPL ELPH-MCNC: 2.9 G/DL — LOW (ref 3.5–5)
ALP SERPL-CCNC: 73 U/L — SIGNIFICANT CHANGE UP (ref 40–120)
ALP SERPL-CCNC: 73 U/L — SIGNIFICANT CHANGE UP (ref 40–120)
ALT FLD-CCNC: 19 U/L DA — SIGNIFICANT CHANGE UP (ref 10–60)
ALT FLD-CCNC: 20 U/L DA — SIGNIFICANT CHANGE UP (ref 10–60)
ANION GAP SERPL CALC-SCNC: 8 MMOL/L — SIGNIFICANT CHANGE UP (ref 5–17)
AST SERPL-CCNC: 19 U/L — SIGNIFICANT CHANGE UP (ref 10–40)
AST SERPL-CCNC: 22 U/L — SIGNIFICANT CHANGE UP (ref 10–40)
BASOPHILS # BLD AUTO: 0 K/UL — SIGNIFICANT CHANGE UP (ref 0–0.2)
BASOPHILS NFR BLD AUTO: 0 % — SIGNIFICANT CHANGE UP (ref 0–2)
BILIRUB DIRECT SERPL-MCNC: 0.2 MG/DL — SIGNIFICANT CHANGE UP (ref 0–0.2)
BILIRUB INDIRECT FLD-MCNC: 0.6 MG/DL — SIGNIFICANT CHANGE UP (ref 0.2–1)
BILIRUB SERPL-MCNC: 0.8 MG/DL — SIGNIFICANT CHANGE UP (ref 0.2–1.2)
BILIRUB SERPL-MCNC: 0.8 MG/DL — SIGNIFICANT CHANGE UP (ref 0.2–1.2)
BUN SERPL-MCNC: 17 MG/DL — SIGNIFICANT CHANGE UP (ref 7–18)
CALCIUM SERPL-MCNC: 9.4 MG/DL — SIGNIFICANT CHANGE UP (ref 8.4–10.5)
CHLORIDE SERPL-SCNC: 105 MMOL/L — SIGNIFICANT CHANGE UP (ref 96–108)
CO2 SERPL-SCNC: 31 MMOL/L — SIGNIFICANT CHANGE UP (ref 22–31)
CREAT SERPL-MCNC: 0.6 MG/DL — SIGNIFICANT CHANGE UP (ref 0.5–1.3)
CULTURE RESULTS: SIGNIFICANT CHANGE UP
CULTURE RESULTS: SIGNIFICANT CHANGE UP
D DIMER BLD IA.RAPID-MCNC: 369 NG/ML DDU — HIGH
EOSINOPHIL # BLD AUTO: 0 K/UL — SIGNIFICANT CHANGE UP (ref 0–0.5)
EOSINOPHIL NFR BLD AUTO: 0 % — SIGNIFICANT CHANGE UP (ref 0–6)
FERRITIN SERPL-MCNC: 162 NG/ML — HIGH (ref 15–150)
GLUCOSE BLDC GLUCOMTR-MCNC: 103 MG/DL — HIGH (ref 70–99)
GLUCOSE BLDC GLUCOMTR-MCNC: 119 MG/DL — HIGH (ref 70–99)
GLUCOSE BLDC GLUCOMTR-MCNC: 131 MG/DL — HIGH (ref 70–99)
GLUCOSE BLDC GLUCOMTR-MCNC: 94 MG/DL — SIGNIFICANT CHANGE UP (ref 70–99)
GLUCOSE SERPL-MCNC: 95 MG/DL — SIGNIFICANT CHANGE UP (ref 70–99)
HCT VFR BLD CALC: 40.2 % — SIGNIFICANT CHANGE UP (ref 34.5–45)
HGB BLD-MCNC: 12.3 G/DL — SIGNIFICANT CHANGE UP (ref 11.5–15.5)
LDH SERPL L TO P-CCNC: 346 U/L — HIGH (ref 120–225)
LYMPHOCYTES # BLD AUTO: 1.73 K/UL — SIGNIFICANT CHANGE UP (ref 1–3.3)
LYMPHOCYTES # BLD AUTO: 28 % — SIGNIFICANT CHANGE UP (ref 13–44)
MANUAL SMEAR VERIFICATION: SIGNIFICANT CHANGE UP
MCHC RBC-ENTMCNC: 27.2 PG — SIGNIFICANT CHANGE UP (ref 27–34)
MCHC RBC-ENTMCNC: 30.6 GM/DL — LOW (ref 32–36)
MCV RBC AUTO: 88.9 FL — SIGNIFICANT CHANGE UP (ref 80–100)
MONOCYTES # BLD AUTO: 0.56 K/UL — SIGNIFICANT CHANGE UP (ref 0–0.9)
MONOCYTES NFR BLD AUTO: 9 % — SIGNIFICANT CHANGE UP (ref 2–14)
NEUTROPHILS # BLD AUTO: 3.59 K/UL — SIGNIFICANT CHANGE UP (ref 1.8–7.4)
NEUTROPHILS NFR BLD AUTO: 58 % — SIGNIFICANT CHANGE UP (ref 43–77)
NRBC # BLD: 0 /100 — SIGNIFICANT CHANGE UP (ref 0–0)
PLAT MORPH BLD: NORMAL — SIGNIFICANT CHANGE UP
PLATELET # BLD AUTO: 185 K/UL — SIGNIFICANT CHANGE UP (ref 150–400)
POTASSIUM SERPL-MCNC: 2.8 MMOL/L — CRITICAL LOW (ref 3.5–5.3)
POTASSIUM SERPL-SCNC: 2.8 MMOL/L — CRITICAL LOW (ref 3.5–5.3)
PROT SERPL-MCNC: 6.9 G/DL — SIGNIFICANT CHANGE UP (ref 6–8.3)
PROT SERPL-MCNC: 7 G/DL — SIGNIFICANT CHANGE UP (ref 6–8.3)
RBC # BLD: 4.52 M/UL — SIGNIFICANT CHANGE UP (ref 3.8–5.2)
RBC # FLD: 15.4 % — HIGH (ref 10.3–14.5)
RBC BLD AUTO: NORMAL — SIGNIFICANT CHANGE UP
SARS-COV-2 RNA SPEC QL NAA+PROBE: DETECTED
SODIUM SERPL-SCNC: 144 MMOL/L — SIGNIFICANT CHANGE UP (ref 135–145)
SPECIMEN SOURCE: SIGNIFICANT CHANGE UP
SPECIMEN SOURCE: SIGNIFICANT CHANGE UP
VARIANT LYMPHS # BLD: 5 % — SIGNIFICANT CHANGE UP (ref 0–6)
WBC # BLD: 6.19 K/UL — SIGNIFICANT CHANGE UP (ref 3.8–10.5)
WBC # FLD AUTO: 6.19 K/UL — SIGNIFICANT CHANGE UP (ref 3.8–10.5)

## 2021-01-11 RX ORDER — LANOLIN ALCOHOL/MO/W.PET/CERES
3 CREAM (GRAM) TOPICAL ONCE
Refills: 0 | Status: COMPLETED | OUTPATIENT
Start: 2021-01-11 | End: 2021-01-11

## 2021-01-11 RX ORDER — POTASSIUM CHLORIDE 20 MEQ
10 PACKET (EA) ORAL
Refills: 0 | Status: COMPLETED | OUTPATIENT
Start: 2021-01-11 | End: 2021-01-11

## 2021-01-11 RX ORDER — POTASSIUM CHLORIDE 20 MEQ
40 PACKET (EA) ORAL ONCE
Refills: 0 | Status: COMPLETED | OUTPATIENT
Start: 2021-01-11 | End: 2021-01-11

## 2021-01-11 RX ORDER — ACETAMINOPHEN 500 MG
1000 TABLET ORAL ONCE
Refills: 0 | Status: DISCONTINUED | OUTPATIENT
Start: 2021-01-11 | End: 2021-01-11

## 2021-01-11 RX ADMIN — LOSARTAN POTASSIUM 100 MILLIGRAM(S): 100 TABLET, FILM COATED ORAL at 05:55

## 2021-01-11 RX ADMIN — SERTRALINE 100 MILLIGRAM(S): 25 TABLET, FILM COATED ORAL at 11:06

## 2021-01-11 RX ADMIN — Medication 6 MILLIGRAM(S): at 05:55

## 2021-01-11 RX ADMIN — TIOTROPIUM BROMIDE 1 CAPSULE(S): 18 CAPSULE ORAL; RESPIRATORY (INHALATION) at 11:07

## 2021-01-11 RX ADMIN — Medication 20 MILLIGRAM(S): at 05:55

## 2021-01-11 RX ADMIN — ALBUTEROL 1 PUFF(S): 90 AEROSOL, METERED ORAL at 16:39

## 2021-01-11 RX ADMIN — ALBUTEROL 1 PUFF(S): 90 AEROSOL, METERED ORAL at 05:55

## 2021-01-11 RX ADMIN — Medication 81 MILLIGRAM(S): at 11:07

## 2021-01-11 RX ADMIN — Medication 40 MILLIEQUIVALENT(S): at 11:06

## 2021-01-11 RX ADMIN — MONTELUKAST 10 MILLIGRAM(S): 4 TABLET, CHEWABLE ORAL at 11:06

## 2021-01-11 RX ADMIN — NYSTATIN CREAM 1 APPLICATION(S): 100000 CREAM TOPICAL at 18:37

## 2021-01-11 RX ADMIN — ATORVASTATIN CALCIUM 40 MILLIGRAM(S): 80 TABLET, FILM COATED ORAL at 21:25

## 2021-01-11 RX ADMIN — ALBUTEROL 1 PUFF(S): 90 AEROSOL, METERED ORAL at 23:02

## 2021-01-11 RX ADMIN — Medication 100 MILLIEQUIVALENT(S): at 12:05

## 2021-01-11 RX ADMIN — Medication 25 MILLIGRAM(S): at 05:55

## 2021-01-11 RX ADMIN — ENOXAPARIN SODIUM 40 MILLIGRAM(S): 100 INJECTION SUBCUTANEOUS at 11:07

## 2021-01-11 RX ADMIN — Medication 100 MILLIEQUIVALENT(S): at 11:04

## 2021-01-11 RX ADMIN — OLANZAPINE 10 MILLIGRAM(S): 15 TABLET, FILM COATED ORAL at 11:06

## 2021-01-11 RX ADMIN — GABAPENTIN 100 MILLIGRAM(S): 400 CAPSULE ORAL at 11:11

## 2021-01-11 RX ADMIN — Medication 100 MILLIEQUIVALENT(S): at 10:55

## 2021-01-11 RX ADMIN — Medication 1000 UNIT(S): at 11:06

## 2021-01-11 RX ADMIN — Medication 3 MILLIGRAM(S): at 22:46

## 2021-01-11 RX ADMIN — ALBUTEROL 1 PUFF(S): 90 AEROSOL, METERED ORAL at 11:11

## 2021-01-11 RX ADMIN — Medication 125 MICROGRAM(S): at 05:56

## 2021-01-11 NOTE — PROGRESS NOTE ADULT - PROBLEM SELECTOR PLAN 4
Pt with exophthalmus  PSH Parathyroidectomy  Pt takes Prednisone 5 mg at home  Home dose presently on hold secondary to Decadron US noted above  Continue with elevation  Completed course of abx

## 2021-01-11 NOTE — PROGRESS NOTE ADULT - PROBLEM SELECTOR PLAN 6
TSH 0.78  Pt takes Synthroid 125 mcg at home  Continue with home regimen Controlled  Pt takes Amlodipine 5 mg, Losartan 100 mg, HCTZ 25 mg and Furosemide 20 mg at home  Continue with Losartan, HCTZ and Furosemide with parameters  Amlodipine currently on hold for LE edema, resume when medically appropriate  Continue to monitor BP

## 2021-01-11 NOTE — PROGRESS NOTE ADULT - PROBLEM SELECTOR PLAN 3
US noted above  Continue with elevation  Completed course of abx K 2.9  Replaced  F/u repeat blood glucose

## 2021-01-11 NOTE — PROGRESS NOTE ADULT - PROBLEM SELECTOR PLAN 5
Controlled  Pt takes Amlodipine 5 mg, Losartan 100 mg, HCTZ 25 mg and Furosemide 20 mg at home  Continue with Losartan, HCTZ and Furosemide with parameters  Amlodipine currently on hold for LE edema, resume when medically appropriate  Continue to monitor BP Pt with exophthalmus  PSH Parathyroidectomy  Pt takes Prednisone 5 mg at home  Home dose presently on hold secondary to Decadron

## 2021-01-11 NOTE — PROGRESS NOTE ADULT - SUBJECTIVE AND OBJECTIVE BOX
HPI:  69 yo female with medical history significant for Anxiety, Asthma, Breast cancer in situ, COPD (chronic obstructive pulmonary disease). DVT (deep venous thrombosis) not presently on A/C, Graves disease, Hyperlipidemia, Hypertension, Hypothyroid, Parathyroidectomy, Kidney cancer, primary, with metastasis from kidney to other site,  Left sided nephrectomy only- no chemo or RT, Obesity, Sleep apnea comes in with dyspnea and fever for 2 days prior to admission.  Pt now satting 97% on 2L.  Has started Decadron and remdesivir, however, remdesivir D/c as pt is Covid antibody positive. Pt admitted for acute hypoxia 2/2 to covid PNA, likely mild to moderate case.  Patient satting 96% on 2L via n/c.     OVERNIGHT EVENTS:  No new overnight events.     REVIEW OF SYSTEMS:      CONSTITUTIONAL: No fever,   EYES: no acute visual disturbances  NECK: No pain or stiffness  RESPIRATORY: No cough; No shortness of breath  CARDIOVASCULAR: No chest pain, no palpitations  GASTROINTESTINAL: No pain. No nausea, vomiting or diarrhea   NEUROLOGICAL: No headache or numbness, no tremors  MUSCULOSKELETAL: No joint pain, no muscle pain  GENITOURINARY: no dysuria, no frequency, no hesitancy  PSYCHIATRY: no depression , no anxiety  ALL OTHER  ROS negative        Vital Signs Last 24 Hrs  T(C): 36.7 (11 Jan 2021 14:54), Max: 37.2 (10 Alan 2021 21:54)  T(F): 98.1 (11 Jan 2021 14:54), Max: 98.9 (10 Alan 2021 21:54)  HR: 77 (11 Jan 2021 14:54) (69 - 77)  BP: 156/69 (11 Jan 2021 14:54) (142/69 - 156/69)  BP(mean): --  RR: 18 (11 Jan 2021 14:54) (18 - 18)  SpO2: 96% (11 Jan 2021 14:54) (96% - 97%)    ________________________________________________  PHYSICAL EXAM:    GENERAL: NAD  HEENT: Normocephalic; conjunctivae and sclerae clear; moist mucous membranes;   NECK : supple, no JVD  CHEST/LUNG: Clear to auscultation bilaterally   HEART: S1 S2  regular  ABDOMEN: Soft, Nontender, Nondistended; Bowel sounds present  EXTREMITIES: no cyanosis; no LE edema; no calf tenderness  SKIN: warm and dry; no rash  NERVOUS SYSTEM:  Alert & Oriented x3; no new deficits    _________________________________________________  CURRENT MEDICATIONS:    MEDICATIONS  (STANDING):  ALBUTerol    90 MICROgram(s) HFA Inhaler 1 Puff(s) Inhalation every 4 hours  aspirin enteric coated 81 milliGRAM(s) Oral daily  atorvastatin 40 milliGRAM(s) Oral at bedtime  cholecalciferol 1000 Unit(s) Oral daily  enoxaparin Injectable 40 milliGRAM(s) SubCutaneous daily  furosemide    Tablet 20 milliGRAM(s) Oral daily  gabapentin 100 milliGRAM(s) Oral daily  hydrochlorothiazide 25 milliGRAM(s) Oral daily  levothyroxine 125 MICROGram(s) Oral daily  losartan 100 milliGRAM(s) Oral daily  montelukast 10 milliGRAM(s) Oral daily  nystatin Powder 1 Application(s) Topical two times a day  OLANZapine 10 milliGRAM(s) Oral daily  pantoprazole    Tablet 40 milliGRAM(s) Oral before breakfast  sertraline 100 milliGRAM(s) Oral daily  tiotropium 18 MICROgram(s) Capsule 1 Capsule(s) Inhalation daily    MEDICATIONS  (PRN):      __________________________________________________  LABS:                          12.3   6.19  )-----------( 185      ( 11 Jan 2021 07:01 )             40.2     01-11    144  |  105  |  17  ----------------------------<  95  2.8<LL>   |  31  |  0.60    Ca    9.4      11 Jan 2021 07:01    TPro  6.9  /  Alb  2.9<L>  /  TBili  0.8  /  DBili  0.2  /  AST  22  /  ALT  20  /  AlkPhos  73  01-11        CAPILLARY BLOOD GLUCOSE      POCT Blood Glucose.: 131 mg/dL (11 Jan 2021 11:35)  POCT Blood Glucose.: 119 mg/dL (11 Jan 2021 07:57)  POCT Blood Glucose.: 120 mg/dL (10 Alan 2021 21:37)  POCT Blood Glucose.: 128 mg/dL (10 Alan 2021 17:39)      __________________________________________________  RADIOLOGY & ADDITIONAL TESTS:    Imaging Personally Reviewed:  YES    < from: US Duplex Venous Lower Ext Complete, Bilateral (01.06.21 @ 16:36) >  IMPRESSION:  No evidence of deep venous thrombosis in either lower extremity as described above.    < end of copied text >    < from: Xray Chest 1 View-PORTABLE IMMEDIATE (01.06.21 @ 10:49) >  Impression: No active infiltrates. Possible small left pleural effusion or pleural scarring    < end of copied text >    Consultant(s) Notes Reviewed:   YES     Plan of care was discussed with patient and /or primary care giver; all questions and concerns were addressed and care was aligned with patient's wishes.    Plan discussed with attending and consulting physicians.   HPI:  69 yo female with medical history significant for Anxiety, Asthma, Breast cancer in situ, COPD (chronic obstructive pulmonary disease). DVT (deep venous thrombosis) not presently on A/C, Graves disease, Hyperlipidemia, Hypertension, Hypothyroid, Parathyroidectomy, Kidney cancer, primary, with metastasis from kidney to other site,  Left sided nephrectomy only- no chemo or RT, Obesity, Sleep apnea comes in with dyspnea and fever for 2 days prior to admission.  Pt now satting 97% on 2L.  Has started Decadron and remdesivir, however, remdesivir D/c as pt is Covid antibody positive. Pt admitted for acute hypoxia 2/2 to covid PNA, likely mild to moderate case.  Patient satting 93% on room air.    OVERNIGHT EVENTS:  No new overnight events.     REVIEW OF SYSTEMS:      CONSTITUTIONAL: No fever,   EYES: no acute visual disturbances  NECK: No pain or stiffness  RESPIRATORY: No cough; No shortness of breath  CARDIOVASCULAR: No chest pain, no palpitations  GASTROINTESTINAL: No pain. No nausea, vomiting or diarrhea   NEUROLOGICAL: No headache or numbness, no tremors  MUSCULOSKELETAL: No joint pain, no muscle pain  GENITOURINARY: no dysuria, no frequency, no hesitancy  PSYCHIATRY: no depression , no anxiety  ALL OTHER  ROS negative        Vital Signs Last 24 Hrs  T(C): 36.7 (11 Jan 2021 14:54), Max: 37.2 (10 Alan 2021 21:54)  T(F): 98.1 (11 Jan 2021 14:54), Max: 98.9 (10 Alan 2021 21:54)  HR: 77 (11 Jan 2021 14:54) (69 - 77)  BP: 156/69 (11 Jan 2021 14:54) (142/69 - 156/69)  BP(mean): --  RR: 18 (11 Jan 2021 14:54) (18 - 18)  SpO2: 96% (11 Jan 2021 14:54) (96% - 97%)    ________________________________________________  PHYSICAL EXAM:    GENERAL: NAD  HEENT: Normocephalic; conjunctivae and sclerae clear; moist mucous membranes;   NECK : supple, no JVD  CHEST/LUNG: Clear to auscultation bilaterally   HEART: S1 S2  regular  ABDOMEN: Soft, Nontender, Nondistended; Bowel sounds present  EXTREMITIES: no cyanosis; no LE edema; no calf tenderness  SKIN: warm and dry; no rash  NERVOUS SYSTEM:  Alert & Oriented x3; no new deficits    _________________________________________________  CURRENT MEDICATIONS:    MEDICATIONS  (STANDING):  ALBUTerol    90 MICROgram(s) HFA Inhaler 1 Puff(s) Inhalation every 4 hours  aspirin enteric coated 81 milliGRAM(s) Oral daily  atorvastatin 40 milliGRAM(s) Oral at bedtime  cholecalciferol 1000 Unit(s) Oral daily  enoxaparin Injectable 40 milliGRAM(s) SubCutaneous daily  furosemide    Tablet 20 milliGRAM(s) Oral daily  gabapentin 100 milliGRAM(s) Oral daily  hydrochlorothiazide 25 milliGRAM(s) Oral daily  levothyroxine 125 MICROGram(s) Oral daily  losartan 100 milliGRAM(s) Oral daily  montelukast 10 milliGRAM(s) Oral daily  nystatin Powder 1 Application(s) Topical two times a day  OLANZapine 10 milliGRAM(s) Oral daily  pantoprazole    Tablet 40 milliGRAM(s) Oral before breakfast  sertraline 100 milliGRAM(s) Oral daily  tiotropium 18 MICROgram(s) Capsule 1 Capsule(s) Inhalation daily    MEDICATIONS  (PRN):      __________________________________________________  LABS:                          12.3   6.19  )-----------( 185      ( 11 Jan 2021 07:01 )             40.2     01-11    144  |  105  |  17  ----------------------------<  95  2.8<LL>   |  31  |  0.60    Ca    9.4      11 Jan 2021 07:01    TPro  6.9  /  Alb  2.9<L>  /  TBili  0.8  /  DBili  0.2  /  AST  22  /  ALT  20  /  AlkPhos  73  01-11        CAPILLARY BLOOD GLUCOSE      POCT Blood Glucose.: 131 mg/dL (11 Jan 2021 11:35)  POCT Blood Glucose.: 119 mg/dL (11 Jan 2021 07:57)  POCT Blood Glucose.: 120 mg/dL (10 Alan 2021 21:37)  POCT Blood Glucose.: 128 mg/dL (10 Alan 2021 17:39)      __________________________________________________  RADIOLOGY & ADDITIONAL TESTS:    Imaging Personally Reviewed:  YES    < from: US Duplex Venous Lower Ext Complete, Bilateral (01.06.21 @ 16:36) >  IMPRESSION:  No evidence of deep venous thrombosis in either lower extremity as described above.    < end of copied text >    < from: Xray Chest 1 View-PORTABLE IMMEDIATE (01.06.21 @ 10:49) >  Impression: No active infiltrates. Possible small left pleural effusion or pleural scarring    < end of copied text >    Consultant(s) Notes Reviewed:   YES     Plan of care was discussed with patient and /or primary care giver; all questions and concerns were addressed and care was aligned with patient's wishes.    Plan discussed with attending and consulting physicians.

## 2021-01-11 NOTE — PROGRESS NOTE ADULT - PROBLEM SELECTOR PLAN 10
Patient admitted from home  PT recommends HPT  Care management following for safe discharge planning Patient to remain FULL CODE  -----  Discharge Planning:  Patient admitted from home  PT recommends HPT  Care management following for safe discharge planning  Per CM, HPT agency willing to take COVID+ pt

## 2021-01-11 NOTE — PROGRESS NOTE ADULT - PROBLEM SELECTOR PLAN 7
Pt takes Spiriva 18 mcg QD at home  Continue with home dose TSH 0.78  Pt takes Synthroid 125 mcg at home  Continue with home regimen

## 2021-01-11 NOTE — PROGRESS NOTE ADULT - PROBLEM SELECTOR PLAN 1
+ COVID PCR, antibody positive  CXR noted above  Satting 96% on 2L  Pt has completed course of Decadron  Remdesivir d/c'd secondary to + antibodies  Taper O2 as tolerated  Continue with supportive care  Continue with isolation + COVID PCR, antibody positive  CXR noted above  Satting 93% on room air  Pt has completed course of Decadron  Remdesivir d/c'd secondary to + antibodies  Taper O2 as tolerated  Continue with supportive care  Continue with isolation

## 2021-01-12 ENCOUNTER — TRANSCRIPTION ENCOUNTER (OUTPATIENT)
Age: 71
End: 2021-01-12

## 2021-01-12 DIAGNOSIS — R21 RASH AND OTHER NONSPECIFIC SKIN ERUPTION: ICD-10-CM

## 2021-01-12 LAB
ALBUMIN SERPL ELPH-MCNC: 3 G/DL — LOW (ref 3.5–5)
ALP SERPL-CCNC: 77 U/L — SIGNIFICANT CHANGE UP (ref 40–120)
ALT FLD-CCNC: 19 U/L DA — SIGNIFICANT CHANGE UP (ref 10–60)
ANION GAP SERPL CALC-SCNC: 8 MMOL/L — SIGNIFICANT CHANGE UP (ref 5–17)
AST SERPL-CCNC: 18 U/L — SIGNIFICANT CHANGE UP (ref 10–40)
BASOPHILS # BLD AUTO: 0.01 K/UL — SIGNIFICANT CHANGE UP (ref 0–0.2)
BASOPHILS NFR BLD AUTO: 0.1 % — SIGNIFICANT CHANGE UP (ref 0–2)
BILIRUB SERPL-MCNC: 1 MG/DL — SIGNIFICANT CHANGE UP (ref 0.2–1.2)
BUN SERPL-MCNC: 14 MG/DL — SIGNIFICANT CHANGE UP (ref 7–18)
CALCIUM SERPL-MCNC: 9.6 MG/DL — SIGNIFICANT CHANGE UP (ref 8.4–10.5)
CHLORIDE SERPL-SCNC: 105 MMOL/L — SIGNIFICANT CHANGE UP (ref 96–108)
CO2 SERPL-SCNC: 29 MMOL/L — SIGNIFICANT CHANGE UP (ref 22–31)
CREAT SERPL-MCNC: 0.64 MG/DL — SIGNIFICANT CHANGE UP (ref 0.5–1.3)
EOSINOPHIL # BLD AUTO: 0.04 K/UL — SIGNIFICANT CHANGE UP (ref 0–0.5)
EOSINOPHIL NFR BLD AUTO: 0.5 % — SIGNIFICANT CHANGE UP (ref 0–6)
GLUCOSE BLDC GLUCOMTR-MCNC: 102 MG/DL — HIGH (ref 70–99)
GLUCOSE BLDC GLUCOMTR-MCNC: 125 MG/DL — HIGH (ref 70–99)
GLUCOSE SERPL-MCNC: 108 MG/DL — HIGH (ref 70–99)
HCT VFR BLD CALC: 38.9 % — SIGNIFICANT CHANGE UP (ref 34.5–45)
HGB BLD-MCNC: 12.1 G/DL — SIGNIFICANT CHANGE UP (ref 11.5–15.5)
IMM GRANULOCYTES NFR BLD AUTO: 0.8 % — SIGNIFICANT CHANGE UP (ref 0–1.5)
LYMPHOCYTES # BLD AUTO: 1.72 K/UL — SIGNIFICANT CHANGE UP (ref 1–3.3)
LYMPHOCYTES # BLD AUTO: 22.4 % — SIGNIFICANT CHANGE UP (ref 13–44)
MAGNESIUM SERPL-MCNC: 1.7 MG/DL — SIGNIFICANT CHANGE UP (ref 1.6–2.6)
MCHC RBC-ENTMCNC: 28.1 PG — SIGNIFICANT CHANGE UP (ref 27–34)
MCHC RBC-ENTMCNC: 31.1 GM/DL — LOW (ref 32–36)
MCV RBC AUTO: 90.5 FL — SIGNIFICANT CHANGE UP (ref 80–100)
MONOCYTES # BLD AUTO: 0.94 K/UL — HIGH (ref 0–0.9)
MONOCYTES NFR BLD AUTO: 12.2 % — SIGNIFICANT CHANGE UP (ref 2–14)
NEUTROPHILS # BLD AUTO: 4.91 K/UL — SIGNIFICANT CHANGE UP (ref 1.8–7.4)
NEUTROPHILS NFR BLD AUTO: 64 % — SIGNIFICANT CHANGE UP (ref 43–77)
NRBC # BLD: 0 /100 WBCS — SIGNIFICANT CHANGE UP (ref 0–0)
PHOSPHATE SERPL-MCNC: 1.9 MG/DL — LOW (ref 2.5–4.5)
PLATELET # BLD AUTO: 204 K/UL — SIGNIFICANT CHANGE UP (ref 150–400)
POTASSIUM SERPL-MCNC: 3.3 MMOL/L — LOW (ref 3.5–5.3)
POTASSIUM SERPL-SCNC: 3.3 MMOL/L — LOW (ref 3.5–5.3)
PROT SERPL-MCNC: 7 G/DL — SIGNIFICANT CHANGE UP (ref 6–8.3)
RBC # BLD: 4.3 M/UL — SIGNIFICANT CHANGE UP (ref 3.8–5.2)
RBC # FLD: 15.6 % — HIGH (ref 10.3–14.5)
SARS-COV-2 RNA SPEC QL NAA+PROBE: DETECTED
SODIUM SERPL-SCNC: 142 MMOL/L — SIGNIFICANT CHANGE UP (ref 135–145)
WBC # BLD: 7.68 K/UL — SIGNIFICANT CHANGE UP (ref 3.8–10.5)
WBC # FLD AUTO: 7.68 K/UL — SIGNIFICANT CHANGE UP (ref 3.8–10.5)

## 2021-01-12 RX ORDER — LANOLIN ALCOHOL/MO/W.PET/CERES
3 CREAM (GRAM) TOPICAL ONCE
Refills: 0 | Status: COMPLETED | OUTPATIENT
Start: 2021-01-12 | End: 2021-01-12

## 2021-01-12 RX ORDER — ACETAMINOPHEN 500 MG
650 TABLET ORAL EVERY 4 HOURS
Refills: 0 | Status: DISCONTINUED | OUTPATIENT
Start: 2021-01-12 | End: 2021-01-13

## 2021-01-12 RX ORDER — AMLODIPINE BESYLATE 2.5 MG/1
5 TABLET ORAL DAILY
Refills: 0 | Status: DISCONTINUED | OUTPATIENT
Start: 2021-01-13 | End: 2021-01-13

## 2021-01-12 RX ORDER — POTASSIUM CHLORIDE 20 MEQ
40 PACKET (EA) ORAL ONCE
Refills: 0 | Status: COMPLETED | OUTPATIENT
Start: 2021-01-12 | End: 2021-01-12

## 2021-01-12 RX ADMIN — ATORVASTATIN CALCIUM 40 MILLIGRAM(S): 80 TABLET, FILM COATED ORAL at 22:09

## 2021-01-12 RX ADMIN — SERTRALINE 100 MILLIGRAM(S): 25 TABLET, FILM COATED ORAL at 11:12

## 2021-01-12 RX ADMIN — Medication 25 MILLIGRAM(S): at 05:34

## 2021-01-12 RX ADMIN — MONTELUKAST 10 MILLIGRAM(S): 4 TABLET, CHEWABLE ORAL at 11:11

## 2021-01-12 RX ADMIN — PANTOPRAZOLE SODIUM 40 MILLIGRAM(S): 20 TABLET, DELAYED RELEASE ORAL at 05:34

## 2021-01-12 RX ADMIN — GABAPENTIN 100 MILLIGRAM(S): 400 CAPSULE ORAL at 11:09

## 2021-01-12 RX ADMIN — NYSTATIN CREAM 1 APPLICATION(S): 100000 CREAM TOPICAL at 05:34

## 2021-01-12 RX ADMIN — Medication 5 MILLIGRAM(S): at 15:23

## 2021-01-12 RX ADMIN — Medication 3 MILLIGRAM(S): at 22:09

## 2021-01-12 RX ADMIN — TIOTROPIUM BROMIDE 1 CAPSULE(S): 18 CAPSULE ORAL; RESPIRATORY (INHALATION) at 11:12

## 2021-01-12 RX ADMIN — Medication 125 MICROGRAM(S): at 05:33

## 2021-01-12 RX ADMIN — ALBUTEROL 1 PUFF(S): 90 AEROSOL, METERED ORAL at 18:04

## 2021-01-12 RX ADMIN — ALBUTEROL 1 PUFF(S): 90 AEROSOL, METERED ORAL at 11:12

## 2021-01-12 RX ADMIN — ALBUTEROL 1 PUFF(S): 90 AEROSOL, METERED ORAL at 22:10

## 2021-01-12 RX ADMIN — LOSARTAN POTASSIUM 100 MILLIGRAM(S): 100 TABLET, FILM COATED ORAL at 05:33

## 2021-01-12 RX ADMIN — ALBUTEROL 1 PUFF(S): 90 AEROSOL, METERED ORAL at 05:33

## 2021-01-12 RX ADMIN — OLANZAPINE 10 MILLIGRAM(S): 15 TABLET, FILM COATED ORAL at 11:11

## 2021-01-12 RX ADMIN — Medication 40 MILLIEQUIVALENT(S): at 15:38

## 2021-01-12 RX ADMIN — Medication 650 MILLIGRAM(S): at 11:10

## 2021-01-12 RX ADMIN — Medication 81 MILLIGRAM(S): at 11:10

## 2021-01-12 RX ADMIN — Medication 1000 UNIT(S): at 11:10

## 2021-01-12 RX ADMIN — NYSTATIN CREAM 1 APPLICATION(S): 100000 CREAM TOPICAL at 18:04

## 2021-01-12 RX ADMIN — Medication 20 MILLIGRAM(S): at 05:33

## 2021-01-12 RX ADMIN — ALBUTEROL 1 PUFF(S): 90 AEROSOL, METERED ORAL at 15:21

## 2021-01-12 RX ADMIN — ENOXAPARIN SODIUM 40 MILLIGRAM(S): 100 INJECTION SUBCUTANEOUS at 11:10

## 2021-01-12 NOTE — PROGRESS NOTE ADULT - PROBLEM SELECTOR PLAN 4
Wound APRN recommends:  -Clean all affected areas with warm water, mild soap, and pat dry  -Apply Several coatings of skin prep barrier  -Please consider application of an external incontinence management system (PrimaFit) for moisture management and wound healing  -Apply TRIAD Moisture Barrier Cream to the Bilateral Gluteus & Post Thigh areas b.i.d. PRN  -Apply Antifungal Powder to the areas under the Panus b.i.d. PRN  -Elevate/float the patients heels using heel protectors and reposition the patient Q 2hrs using wedges or pillows

## 2021-01-12 NOTE — DISCHARGE NOTE PROVIDER - NSDCCAREPROVSEEN_GEN_ALL_CORE_FT
Kamila Welch Kamila Enriquez  [place] Given Chela Lund  [facility] ACP - NP Service

## 2021-01-12 NOTE — DISCHARGE NOTE PROVIDER - NSDCCPCAREPLAN_GEN_ALL_CORE_FT
PRINCIPAL DISCHARGE DIAGNOSIS  Diagnosis: COVID-19  Assessment and Plan of Treatment: You tested positive for the Coronavirus.  It is imperative that you follow the recommendations outlined below.      SECONDARY DISCHARGE DIAGNOSES  Diagnosis: Hypoxia  Assessment and Plan of Treatment: Your hypoxia was the result of your COVID-19 illness.  You still require oxygen.  It is important that you follow up with the oxygen company to ensure you have ample oxygen available until you no longer need it.

## 2021-01-12 NOTE — DISCHARGE NOTE PROVIDER - HOSPITAL COURSE
71 yo female with medical history significant for Anxiety, Asthma, Breast cancer in situ, COPD (chronic obstructive pulmonary disease). DVT (deep venous thrombosis) not presently on A/C, Graves disease, Hyperlipidemia, Hypertension, Hypothyroid, Parathyroidectomy, Kidney cancer, primary, with metastasis from kidney to other site,  Left sided nephrectomy only- no chemo or RT, Obesity, Sleep apnea comes in with dyspnea and fever for 2 days prior to admission.  Pt now satting 97% on 2L.  Has started Decadron and remdesivir, however, remdesivir D/c as pt is Covid antibody positive. Pt admitted for acute hypoxia 2/2 to covid PNA.     has arranged for [facility] to return to pt home despite pt COVID status.    Home oxygen ordered. 69 yo female with medical history significant for Anxiety, Asthma, Breast cancer in situ, COPD (chronic obstructive pulmonary disease). DVT (deep venous thrombosis) not presently on A/C, Graves disease, Hyperlipidemia, Hypertension, Hypothyroid, Parathyroidectomy, Kidney cancer, primary, with metastasis from kidney to other site,  Left sided nephrectomy only- no chemo or RT, Obesity, Sleep apnea comes in with dyspnea and fever for 2 days prior to admission.  Pt now satting 97% on 2L.  Has started Decadron and remdesivir, however, remdesivir D/c as pt is Covid antibody positive. Pt admitted for acute hypoxia 2/2 to covid PNA.     has arranged for [facility] to return to pt home despite pt COVID status.    Home oxygen ordered.    Home oxygen to be delivered today.  [facility] to resume service 1/14 AM.    Discussed with attending.    Patient medically stable for discharge.    For full hospital course, please see medical record.

## 2021-01-12 NOTE — PROGRESS NOTE ADULT - PROBLEM SELECTOR PLAN 6
Pt with exophthalmus  PSH Parathyroidectomy  Pt takes Prednisone 5 mg at home  Pt has completed course of decadron  Restart home regimen

## 2021-01-12 NOTE — ADVANCED PRACTICE NURSE CONSULT - ASSESSMENT
This is a 70yr old female patient admitted for JUDITH-COV-2, presenting with evidence of Moisture Associated Skin Damage to the Bilateral Gluteus & Post Thighs and under the Panus with surrounding tissue maceration

## 2021-01-12 NOTE — PROGRESS NOTE ADULT - PROBLEM SELECTOR PLAN 1
+ COVID PCR (1/4), antibody positive  Follow up repeat PCR  CXR noted above  O2 sats 96% on 3L  Pt has completed course of Decadron  Remdesivir d/c'd secondary to + antibodies  Taper O2 as tolerated  Continue with SQ Lovenox   Continue with supportive care  Continue with isolation + COVID PCR (1/10), antibody positive  Follow up repeat PCR in AM  CXR noted above  O2 sats 96% on 3L  Pt has completed course of Decadron  Remdesivir d/c'd secondary to + antibodies  Taper O2 as tolerated  Continue with SQ Lovenox   Continue with supportive care  Continue with isolation

## 2021-01-12 NOTE — DISCHARGE NOTE PROVIDER - NSDCFUADDINST_GEN_ALL_CORE_FT
CORONAVIRUS INSTRUCTIONS:  Based on your current clinical status and stability, it has been determined that you no longer need hospitalization and can recover while remaining in self-quarantine at home. You should follow the prevention steps below until a healthcare provider or local or state health department says you can return to your normal activities.     1. You should restrict activities outside your home, except for getting medical care.   2. Do not go to work, school, or public areas.   3. Avoid using public transportation, ride-sharing, or taxis.   4. Separate yourself from other people and animals in your home as much as possible.  When you are around other people (e.g., sharing a room or vehicle) you should wear a facemask.  5. Wash your hands often with soap and water for at least 20 seconds, especially after blowing your nose, coughing, or sneezing; going to the bathroom; and before eating or preparing food.  6. Cover your mouth and nose with a tissue when you cough or sneeze. Throw used tissues in a lined trash can. Immediately wash your hands with soap and water for at least 20 seconds  7. High touch surfaces include counters, tabletops, doorknobs, bathroom fixtures, toilets, phones, keyboards, tablets, and bedside tables.  8. Avoid sharing dishes, drinking glasses, cups, eating utensils, towels, or bedding with other people or pets in your home. After using these items, they should be washed thoroughly with soap and water.  You are strongly advised to seek prompt medical attention if your illness worsens or you develop new symptoms like fever or difficulty breathing.     Please call  [phone] to speak with your discharging physician if you have any questions.

## 2021-01-12 NOTE — DISCHARGE NOTE PROVIDER - CARE PROVIDER_API CALL
SANDRA RAY  [id number] [place] [address]  Phone: [phone]  Fax: [phone]  Established Patient  Follow Up Time: 1 week

## 2021-01-12 NOTE — DISCHARGE NOTE PROVIDER - NSDCFUSCHEDAPPT_GEN_ALL_CORE_FT
PEDRO SHEKHAR S ; 01/19/2021 ; [facility] [phone]  PEDRO SHEKHAR S ; 01/22/2021 ; [facility] [address]  PEDRO SHEKHAR S ; 02/19/2021 ; [facility] [address]  PEDRO SHEKHAR S ; 02/19/2021 ; [facility]  [address] SHEKHAR VASQUEZ S ; 01/19/2021 ; [facility] [address]  SHEKHAR VASQUEZ ; 01/22/2021 ; [facility]  SHEKHAR VASQUEZ S ; 01/22/2021 ; [facility] [address]  SHEKHAR VASQUEZ S ; 02/19/2021 ; [facility] [address] [address]  SHEKHAR VASQUEZ S ; 02/19/2021 ; [facility] Rad  [address] SHEKHAR VASQUEZ S ; 02/19/2021 ; [facility] [address] [address]  SHEKHAR VASQUEZ ; 02/19/2021 ; [facility]  [address]

## 2021-01-12 NOTE — PROGRESS NOTE ADULT - SUBJECTIVE AND OBJECTIVE BOX
HPI:      OVERNIGHT EVENTS:      REVIEW OF SYSTEMS:      CONSTITUTIONAL: No fever,   EYES: no acute visual disturbances  NECK: No pain or stiffness  RESPIRATORY: No cough; No shortness of breath  CARDIOVASCULAR: No chest pain, no palpitations  GASTROINTESTINAL: No pain. No nausea, vomiting or diarrhea   NEUROLOGICAL: No headache or numbness, no tremors  MUSCULOSKELETAL: No joint pain, no muscle pain  GENITOURINARY: no dysuria, no frequency, no hesitancy  PSYCHIATRY: no depression , no anxiety  ALL OTHER  ROS negative        Vital Signs Last 24 Hrs  T(C): 37.4 (12 Jan 2021 05:00), Max: 37.4 (12 Jan 2021 05:00)  T(F): 99.3 (12 Jan 2021 05:00), Max: 99.3 (12 Jan 2021 05:00)  HR: 94 (12 Jan 2021 05:00) (71 - 94)  BP: 159/73 (12 Jan 2021 05:00) (149/71 - 161/90)  BP(mean): --  RR: 18 (12 Jan 2021 05:00) (18 - 20)  SpO2: 94% (12 Jan 2021 05:00) (77% - 96%)    ________________________________________________  PHYSICAL EXAM:    GENERAL: NAD  HEENT: Normocephalic; conjunctivae and sclerae clear; moist mucous membranes;   NECK : supple, no JVD  CHEST/LUNG: Clear to auscultation bilaterally   HEART: S1 S2  regular  ABDOMEN: Soft, Nontender, Nondistended; Bowel sounds present  EXTREMITIES: no cyanosis; no LE edema; no calf tenderness  SKIN: warm and dry; no rash  NERVOUS SYSTEM:  Alert & Oriented x3; no new deficits    _________________________________________________  CURRENT MEDICATIONS:    MEDICATIONS  (STANDING):  ALBUTerol    90 MICROgram(s) HFA Inhaler 1 Puff(s) Inhalation every 4 hours  aspirin enteric coated 81 milliGRAM(s) Oral daily  atorvastatin 40 milliGRAM(s) Oral at bedtime  cholecalciferol 1000 Unit(s) Oral daily  enoxaparin Injectable 40 milliGRAM(s) SubCutaneous daily  furosemide    Tablet 20 milliGRAM(s) Oral daily  gabapentin 100 milliGRAM(s) Oral daily  hydrochlorothiazide 25 milliGRAM(s) Oral daily  levothyroxine 125 MICROGram(s) Oral daily  losartan 100 milliGRAM(s) Oral daily  montelukast 10 milliGRAM(s) Oral daily  nystatin Powder 1 Application(s) Topical two times a day  OLANZapine 10 milliGRAM(s) Oral daily  pantoprazole    Tablet 40 milliGRAM(s) Oral before breakfast  sertraline 100 milliGRAM(s) Oral daily  tiotropium 18 MICROgram(s) Capsule 1 Capsule(s) Inhalation daily    MEDICATIONS  (PRN):      __________________________________________________  LABS:                          12.1   7.68  )-----------( 204      ( 12 Jan 2021 07:07 )             38.9     01-12    142  |  105  |  14  ----------------------------<  108<H>  3.3<L>   |  29  |  0.64    Ca    9.6      12 Jan 2021 07:07  Phos  1.9     01-12  Mg     1.7     01-12    TPro  7.0  /  Alb  3.0<L>  /  TBili  1.0  /  DBili  x   /  AST  18  /  ALT  19  /  AlkPhos  77  01-12        CAPILLARY BLOOD GLUCOSE      POCT Blood Glucose.: 94 mg/dL (11 Jan 2021 21:41)  POCT Blood Glucose.: 103 mg/dL (11 Jan 2021 17:18)  POCT Blood Glucose.: 131 mg/dL (11 Jan 2021 11:35)      __________________________________________________  RADIOLOGY & ADDITIONAL TESTS:    Imaging Personally Reviewed:  YES      Consultant(s) Notes Reviewed:   YES     Plan of care was discussed with patient and /or primary care giver; all questions and concerns were addressed and care was aligned with patient's wishes.    Plan discussed with attending and consulting physicians.   HPI:  71 yo female with medical history significant for Anxiety, Asthma, Breast cancer in situ, COPD (chronic obstructive pulmonary disease). DVT (deep venous thrombosis) not presently on A/C, Graves disease, Hyperlipidemia, Hypertension, Hypothyroid, Parathyroidectomy, Kidney cancer, primary, with metastasis from kidney to other site,  Left sided nephrectomy only- no chemo or RT, Obesity, Sleep apnea comes in with dyspnea and fever for 2 days prior to admission.  Pt now satting 97% on 2L.  Has started Decadron and remdesivir, however, remdesivir D/c as pt is Covid antibody positive. Pt admitted for acute hypoxia 2/2 to covid PNA, likely mild to moderate case.  Pt O2 sat 93% on room air prior to PT.  Dropped to 77%, placed back on 3L NC.     OVERNIGHT EVENTS:  No new overnight events.     REVIEW OF SYSTEMS:      CONSTITUTIONAL: No fever,   EYES: no acute visual disturbances  NECK: No pain or stiffness  RESPIRATORY: No cough; No shortness of breath while on oxygen  CARDIOVASCULAR: No chest pain, no palpitations  GASTROINTESTINAL: No pain. No nausea, vomiting or diarrhea   NEUROLOGICAL: No headache or numbness, no tremors  MUSCULOSKELETAL: No joint pain, no muscle pain  GENITOURINARY: no dysuria, no frequency, no hesitancy  PSYCHIATRY: no depression , no anxiety  ALL OTHER  ROS negative        Vital Signs Last 24 Hrs  T(C): 37.4 (12 Jan 2021 05:00), Max: 37.4 (12 Jan 2021 05:00)  T(F): 99.3 (12 Jan 2021 05:00), Max: 99.3 (12 Jan 2021 05:00)  HR: 94 (12 Jan 2021 05:00) (71 - 94)  BP: 159/73 (12 Jan 2021 05:00) (149/71 - 161/90)  BP(mean): --  RR: 18 (12 Jan 2021 05:00) (18 - 20)  SpO2: 94% (12 Jan 2021 05:00) (77% - 96%)    ________________________________________________  PHYSICAL EXAM:    GENERAL: Obese, sitting up in bed, NAD  HEENT: Normocephalic; conjunctivae and sclerae clear  NECK : supple, no JVD  CHEST/LUNG: Clear to auscultation bilaterally   HEART: S1 S2  regular  ABDOMEN: Soft, Nontender, Nondistended; Bowel sounds present  EXTREMITIES: no cyanosis; LE edema; no calf tenderness  SKIN: warm and dry  NERVOUS SYSTEM:  Alert & Oriented x3; no new deficits    _________________________________________________  CURRENT MEDICATIONS:    MEDICATIONS  (STANDING):  ALBUTerol    90 MICROgram(s) HFA Inhaler 1 Puff(s) Inhalation every 4 hours  aspirin enteric coated 81 milliGRAM(s) Oral daily  atorvastatin 40 milliGRAM(s) Oral at bedtime  cholecalciferol 1000 Unit(s) Oral daily  enoxaparin Injectable 40 milliGRAM(s) SubCutaneous daily  furosemide    Tablet 20 milliGRAM(s) Oral daily  gabapentin 100 milliGRAM(s) Oral daily  hydrochlorothiazide 25 milliGRAM(s) Oral daily  levothyroxine 125 MICROGram(s) Oral daily  losartan 100 milliGRAM(s) Oral daily  montelukast 10 milliGRAM(s) Oral daily  nystatin Powder 1 Application(s) Topical two times a day  OLANZapine 10 milliGRAM(s) Oral daily  pantoprazole    Tablet 40 milliGRAM(s) Oral before breakfast  sertraline 100 milliGRAM(s) Oral daily  tiotropium 18 MICROgram(s) Capsule 1 Capsule(s) Inhalation daily    MEDICATIONS  (PRN):      __________________________________________________  LABS:                          12.1   7.68  )-----------( 204      ( 12 Jan 2021 07:07 )             38.9     01-12    142  |  105  |  14  ----------------------------<  108<H>  3.3<L>   |  29  |  0.64    Ca    9.6      12 Jan 2021 07:07  Phos  1.9     01-12  Mg     1.7     01-12    TPro  7.0  /  Alb  3.0<L>  /  TBili  1.0  /  DBili  x   /  AST  18  /  ALT  19  /  AlkPhos  77  01-12        CAPILLARY BLOOD GLUCOSE      POCT Blood Glucose.: 94 mg/dL (11 Jan 2021 21:41)  POCT Blood Glucose.: 103 mg/dL (11 Jan 2021 17:18)  POCT Blood Glucose.: 131 mg/dL (11 Jan 2021 11:35)      __________________________________________________  RADIOLOGY & ADDITIONAL TESTS:    Imaging Personally Reviewed:  YES    < from: US Duplex Venous Lower Ext Complete, Bilateral (01.06.21 @ 16:36) >  IMPRESSION:  No evidence of deep venous thrombosis in either lower extremity as described above.    < end of copied text >    < from: Xray Chest 1 View-PORTABLE IMMEDIATE (01.06.21 @ 10:49) >  Impression: No active infiltrates. Possible small left pleural effusion or pleural scarring    < end of copied text >    Consultant(s) Notes Reviewed:   YES     Plan of care was discussed with patient and /or primary care giver; all questions and concerns were addressed and care was aligned with patient's wishes.    Plan discussed with attending and consulting physicians.

## 2021-01-12 NOTE — DISCHARGE NOTE PROVIDER - DETAILS OF MALNUTRITION DIAGNOSIS/DIAGNOSES
This patient has been assessed with a concern for Malnutrition and was treated during this hospitalization for the following Nutrition diagnosis/diagnoses:     -  01/13/2021: Morbid obesity (BMI > 40)   This patient has been assessed with a concern for Malnutrition and was treated during this hospitalization for the following Nutrition diagnosis/diagnoses:     -  01/13/2021: Morbid obesity (BMI > 40)    This patient has been assessed with a concern for Malnutrition and was treated during this hospitalization for the following Nutrition diagnosis/diagnoses:     -  01/13/2021: Morbid obesity (BMI > 40)

## 2021-01-12 NOTE — PROGRESS NOTE ADULT - PROBLEM SELECTOR PLAN 2
As above    Oxygen sats @ rest 93% on room air  Oxygen sats with exacerbation 77% on room air  Oxygen sats 96% on 3L via n/c As above    Oxygen sats @ rest 93% on room air  Oxygen sats with exacerbation 77% on room air  Oxygen sats 96% on 3L via n/c on ambulation

## 2021-01-12 NOTE — DISCHARGE NOTE PROVIDER - NSDCMRMEDTOKEN_GEN_ALL_CORE_FT
amlodipine 5 mg tablet:   ASPIRIN LOW DOSE 81MG TBEC: TAKE ONE TABLET BY MOUTH EVERY DAY  atorvastatin 40 mg tablet:   baclofen 10 mg tablet:   furosemide 20 mg tablet:   gabapentin 100 mg capsule:   hydralazine 25 mg tablet:   hydrochlorothiazide 25 mg tablet:   levothyroxine 125 mcg tablet:   losartan 100 mg tablet:   MELATONIN 3MG TABS: TAKE ONE TABLET BY MOUTH EVERY DAY  montelukast 10 mg tablet:   olanzapine 20 mg tablet:   Oxygen: Portable and continuous oxygen to be used at 3LPM via nasal canula 24x7     Oxygen sats @ rest 93% on room air  Oxygen sats with exacerbation 77% on room air  Oxygen sats 96% on 3L via n/c on ambulation  prednisone 5 mg tablet:   sertraline 100 mg tablet:   SPIRIVA HANDIHALER 18MCG CAPS: INHALE ONE CAPSULE BY MOUTH EVERY DAY  VITAMIN D3 25 MCG(1000 UT) TABS: TAKE ONE TABLET BY MOUTH EVERY DAY   acetaminophen 325 mg oral tablet: 2 tab(s) orally every 4 hours, As needed, Mild Pain (1 - 3)  albuterol 90 mcg/inh inhalation aerosol: 1 puff(s) inhaled every 4 hours   amlodipine 5 mg tablet:   ASPIRIN LOW DOSE 81MG TBEC: TAKE ONE TABLET BY MOUTH EVERY DAY  atorvastatin 40 mg tablet:   baclofen 10 mg tablet:   furosemide 20 mg tablet:   gabapentin 100 mg capsule:   hydralazine 25 mg tablet:   hydrochlorothiazide 25 mg tablet:   losartan 100 mg tablet:   MELATONIN 3MG TABS: TAKE ONE TABLET BY MOUTH EVERY DAY  montelukast 10 mg tablet:   nystatin 100,000 units/g topical powder: 1 application topically 2 times a day  olanzapine 20 mg tablet:   Oxygen: Portable and continuous oxygen to be used at 3LPM via nasal canula 24x7     Oxygen sats @ rest 93% on room air  Oxygen sats with exacerbation 77% on room air  Oxygen sats 96% on 3L via n/c on ambulation  prednisone 5 mg tablet:   sertraline 100 mg tablet:   SPIRIVA HANDIHALER 18MCG CAPS: INHALE ONE CAPSULE BY MOUTH EVERY DAY  VITAMIN D3 25 MCG(1000 UT) TABS: TAKE ONE TABLET BY MOUTH EVERY DAY

## 2021-01-12 NOTE — PROGRESS NOTE ADULT - PROBLEM SELECTOR PLAN 10
Patient to remain FULL CODE  -----  Discharge Planning:  Patient admitted from home  PT recommends HPT  Care management following for safe discharge planning  Home oxygen request form completed and given to CM Patient to remain FULL CODE  -----  Discharge Planning:  Pt medically stable for discharge  Admitted from home  PT recommends HPT  Care management following for safe discharge planning  Home oxygen request form completed and given to CM  CM has conformed HHA will return to pts home despite COVID+ status

## 2021-01-12 NOTE — ADVANCED PRACTICE NURSE CONSULT - RECOMMEDATIONS
-Clean all affected areas with warm water, mild soap, and pat dry  -Apply Several coatings of skin prep barrier  -Please consider application of an external incontinence management system (PrimaFit) for moisture management and wound healing  -Apply TRIAD Moisture Barrier Cream to the Bilateral Gluteus & Post Thigh areas b.i.d. PRN  -Apply Antifungal Powder to the areas under the Panus b.i.d. PRN  -Elevate/float the patients heels using heel protectors and reposition the patient Q 2hrs using wedges or pillows

## 2021-01-13 ENCOUNTER — TRANSCRIPTION ENCOUNTER (OUTPATIENT)
Age: 71
End: 2021-01-13

## 2021-01-13 VITALS
TEMPERATURE: 98 F | DIASTOLIC BLOOD PRESSURE: 70 MMHG | OXYGEN SATURATION: 96 % | SYSTOLIC BLOOD PRESSURE: 128 MMHG | HEART RATE: 100 BPM | RESPIRATION RATE: 19 BRPM

## 2021-01-13 LAB
ALBUMIN SERPL ELPH-MCNC: 2.7 G/DL — LOW (ref 3.5–5)
ALP SERPL-CCNC: 74 U/L — SIGNIFICANT CHANGE UP (ref 40–120)
ALT FLD-CCNC: 17 U/L DA — SIGNIFICANT CHANGE UP (ref 10–60)
ANION GAP SERPL CALC-SCNC: 7 MMOL/L — SIGNIFICANT CHANGE UP (ref 5–17)
AST SERPL-CCNC: 19 U/L — SIGNIFICANT CHANGE UP (ref 10–40)
BASOPHILS # BLD AUTO: 0.01 K/UL — SIGNIFICANT CHANGE UP (ref 0–0.2)
BASOPHILS NFR BLD AUTO: 0.1 % — SIGNIFICANT CHANGE UP (ref 0–2)
BILIRUB SERPL-MCNC: 1.1 MG/DL — SIGNIFICANT CHANGE UP (ref 0.2–1.2)
BUN SERPL-MCNC: 16 MG/DL — SIGNIFICANT CHANGE UP (ref 7–18)
CALCIUM SERPL-MCNC: 9.5 MG/DL — SIGNIFICANT CHANGE UP (ref 8.4–10.5)
CHLORIDE SERPL-SCNC: 104 MMOL/L — SIGNIFICANT CHANGE UP (ref 96–108)
CO2 SERPL-SCNC: 29 MMOL/L — SIGNIFICANT CHANGE UP (ref 22–31)
CREAT SERPL-MCNC: 0.73 MG/DL — SIGNIFICANT CHANGE UP (ref 0.5–1.3)
EOSINOPHIL # BLD AUTO: 0.1 K/UL — SIGNIFICANT CHANGE UP (ref 0–0.5)
EOSINOPHIL NFR BLD AUTO: 1.2 % — SIGNIFICANT CHANGE UP (ref 0–6)
GLUCOSE BLDC GLUCOMTR-MCNC: 113 MG/DL — HIGH (ref 70–99)
GLUCOSE BLDC GLUCOMTR-MCNC: 123 MG/DL — HIGH (ref 70–99)
GLUCOSE BLDC GLUCOMTR-MCNC: 93 MG/DL — SIGNIFICANT CHANGE UP (ref 70–99)
GLUCOSE SERPL-MCNC: 115 MG/DL — HIGH (ref 70–99)
HCT VFR BLD CALC: 39.3 % — SIGNIFICANT CHANGE UP (ref 34.5–45)
HGB BLD-MCNC: 11.9 G/DL — SIGNIFICANT CHANGE UP (ref 11.5–15.5)
IMM GRANULOCYTES NFR BLD AUTO: 1.1 % — SIGNIFICANT CHANGE UP (ref 0–1.5)
LYMPHOCYTES # BLD AUTO: 1.05 K/UL — SIGNIFICANT CHANGE UP (ref 1–3.3)
LYMPHOCYTES # BLD AUTO: 12.7 % — LOW (ref 13–44)
MAGNESIUM SERPL-MCNC: 1.7 MG/DL — SIGNIFICANT CHANGE UP (ref 1.6–2.6)
MCHC RBC-ENTMCNC: 27.3 PG — SIGNIFICANT CHANGE UP (ref 27–34)
MCHC RBC-ENTMCNC: 30.3 GM/DL — LOW (ref 32–36)
MCV RBC AUTO: 90.1 FL — SIGNIFICANT CHANGE UP (ref 80–100)
MONOCYTES # BLD AUTO: 0.8 K/UL — SIGNIFICANT CHANGE UP (ref 0–0.9)
MONOCYTES NFR BLD AUTO: 9.7 % — SIGNIFICANT CHANGE UP (ref 2–14)
NEUTROPHILS # BLD AUTO: 6.24 K/UL — SIGNIFICANT CHANGE UP (ref 1.8–7.4)
NEUTROPHILS NFR BLD AUTO: 75.2 % — SIGNIFICANT CHANGE UP (ref 43–77)
NRBC # BLD: 0 /100 WBCS — SIGNIFICANT CHANGE UP (ref 0–0)
PHOSPHATE SERPL-MCNC: 2.3 MG/DL — LOW (ref 2.5–4.5)
PLATELET # BLD AUTO: 185 K/UL — SIGNIFICANT CHANGE UP (ref 150–400)
POTASSIUM SERPL-MCNC: 3.7 MMOL/L — SIGNIFICANT CHANGE UP (ref 3.5–5.3)
POTASSIUM SERPL-SCNC: 3.7 MMOL/L — SIGNIFICANT CHANGE UP (ref 3.5–5.3)
PROT SERPL-MCNC: 6.9 G/DL — SIGNIFICANT CHANGE UP (ref 6–8.3)
RBC # BLD: 4.36 M/UL — SIGNIFICANT CHANGE UP (ref 3.8–5.2)
RBC # FLD: 15.9 % — HIGH (ref 10.3–14.5)
SODIUM SERPL-SCNC: 140 MMOL/L — SIGNIFICANT CHANGE UP (ref 135–145)
WBC # BLD: 8.29 K/UL — SIGNIFICANT CHANGE UP (ref 3.8–10.5)
WBC # FLD AUTO: 8.29 K/UL — SIGNIFICANT CHANGE UP (ref 3.8–10.5)

## 2021-01-13 RX ORDER — ACETAMINOPHEN 500 MG
2 TABLET ORAL
Qty: 0 | Refills: 0 | DISCHARGE
Start: 2021-01-13

## 2021-01-13 RX ORDER — NYSTATIN CREAM 100000 [USP'U]/G
1 CREAM TOPICAL
Qty: 1 | Refills: 0
Start: 2021-01-13 | End: 2021-02-11

## 2021-01-13 RX ORDER — LEVOTHYROXINE SODIUM 125 MCG
0 TABLET ORAL
Qty: 0 | Refills: 0 | DISCHARGE

## 2021-01-13 RX ORDER — ALBUTEROL 90 UG/1
1 AEROSOL, METERED ORAL
Qty: 1 | Refills: 0
Start: 2021-01-13 | End: 2021-02-11

## 2021-01-13 RX ADMIN — GABAPENTIN 100 MILLIGRAM(S): 400 CAPSULE ORAL at 12:58

## 2021-01-13 RX ADMIN — LOSARTAN POTASSIUM 100 MILLIGRAM(S): 100 TABLET, FILM COATED ORAL at 05:04

## 2021-01-13 RX ADMIN — TIOTROPIUM BROMIDE 1 CAPSULE(S): 18 CAPSULE ORAL; RESPIRATORY (INHALATION) at 12:54

## 2021-01-13 RX ADMIN — Medication 125 MICROGRAM(S): at 05:05

## 2021-01-13 RX ADMIN — AMLODIPINE BESYLATE 5 MILLIGRAM(S): 2.5 TABLET ORAL at 05:05

## 2021-01-13 RX ADMIN — PANTOPRAZOLE SODIUM 40 MILLIGRAM(S): 20 TABLET, DELAYED RELEASE ORAL at 05:04

## 2021-01-13 RX ADMIN — ALBUTEROL 1 PUFF(S): 90 AEROSOL, METERED ORAL at 12:54

## 2021-01-13 RX ADMIN — NYSTATIN CREAM 1 APPLICATION(S): 100000 CREAM TOPICAL at 05:05

## 2021-01-13 RX ADMIN — ALBUTEROL 1 PUFF(S): 90 AEROSOL, METERED ORAL at 05:04

## 2021-01-13 RX ADMIN — Medication 5 MILLIGRAM(S): at 05:04

## 2021-01-13 RX ADMIN — Medication 20 MILLIGRAM(S): at 05:05

## 2021-01-13 RX ADMIN — Medication 25 MILLIGRAM(S): at 05:04

## 2021-01-13 NOTE — DIETITIAN INITIAL EVALUATION ADULT. - ETIOLOGY
acute on chronic comorbidities including Covid19 infection, COPD, Cancer with mets acute on chronic comorbidities including Covid19 infection, COPD, Cancer with mets, individual/personal food preferences

## 2021-01-13 NOTE — DIETITIAN INITIAL EVALUATION ADULT. - DIET TYPE
May consider oral nutritional supplement ( Ensure Enlive) if persistently decreased intake May consider oral nutritional supplement ( Ensure Enlive) if persistently decreased intake/pt receptive

## 2021-01-13 NOTE — DIETITIAN INITIAL EVALUATION ADULT. - OTHER INFO
Pt lives home with family PTA, COVID19+franchesca, in airborne/contact isolation room, unable to conduct a face to face interview due to limited contact restrictions related to pt's medical condition and isolation precautions, attempt made to contact pt via phone ( # [phone]), not answering; Discussed with RN, no nutrition related complaints, 100% intake reported Pt lives home with family PTA, COVID19+franchesca, in airborne/contact isolation room, unable to conduct a face to face interview due to limited contact restrictions related to pt's medical condition and isolation precautions, spoke to pt over phone ( bedside # [id number]), alert, oriented, well-communicated; decreased intake x a few days PTA, unclear recent wt changes, denied GI distress, chewing or swallowing problem at present, no specific food choices reported, still varied intake depending on food served and how pt feels, declining oral nutritional supplement at present, pending discharge planning to home; Discussed with RN, no nutrition related complaints reported

## 2021-01-13 NOTE — DIETITIAN INITIAL EVALUATION ADULT. - PERTINENT LABORATORY DATA
01-13 Na140 mmol/L Glu 115 mg/dL<H> K+ 3.7 mmol/L Cr  0.73 mg/dL BUN 16 mg/dL   01-13 Phos 2.3 mg/dL<L>   01-13 Alb 2.7 g/dL<L>       01-06 Chol 150 mg/dL LDL --    HDL 45 mg/dL<L> Trig 129 mg/dL  01-06-21 @ 21:14 HgbA1C 5.4 [4.0 - 5.6]

## 2021-01-13 NOTE — DIETITIAN INITIAL EVALUATION ADULT. - SIGNS/SYMPTOMS
Likely varied intake depending on food served and how pt feels decreased intake x a few days PTA, varied intake depending on food served and how pt feels

## 2021-01-13 NOTE — PROGRESS NOTE ADULT - ASSESSMENT
_________________________________________________________________________________________  ========>>  M E D I C A L   A T T E N D I N G    F O L L O W  U P  N O T E  <<=========  -----------------------------------------------------------------------------------------------------    - Patient seen and examined by me earlier today.   - In summary,  SHEKHAR VASQUEZ is a 70y year old woman admitted with SOB   - Patient today overall doing ok, comfortable, eating OK. breathing ok    ==================>> REVIEW OF SYSTEM <<=================    GEN: no fever, no chills, no pain  RESP: no signif  SOB, occasionalcough, no sputum  CVS: no chest pain, no palpitations, no edema  GI: no abdominal pain, no nausea  : no dysuria, no frequency, no hematuria  Neuro: no headache, no dizziness  Derm : no itching, no rash    ==================>> PHYSICAL EXAM <<=================    GEN: A&O X 3 , NAD , comfortable  HEENT: NCAT, PERRL, MMM, hearing intact  Neck: supple , no JVD appreciated  CVS: S1S2 , regular , No M/R/G appreciated  PULM: CTA B/L,  no wheezing   ABD.: soft. non tender, non distended,  bowel sounds present  Extrem: intact pulses , no edema   PSYCH : normal mood,  not anxious      ==================>> MEDICATIONS <<====================    ALBUTerol    90 MICROgram(s) HFA Inhaler 1 Puff(s) Inhalation every 4 hours  aspirin enteric coated 81 milliGRAM(s) Oral daily  atorvastatin 40 milliGRAM(s) Oral at bedtime  cholecalciferol 1000 Unit(s) Oral daily  enoxaparin Injectable 40 milliGRAM(s) SubCutaneous daily  furosemide    Tablet 20 milliGRAM(s) Oral daily  gabapentin 100 milliGRAM(s) Oral daily  hydrochlorothiazide 25 milliGRAM(s) Oral daily  levothyroxine 125 MICROGram(s) Oral daily  losartan 100 milliGRAM(s) Oral daily  montelukast 10 milliGRAM(s) Oral daily  nystatin Powder 1 Application(s) Topical two times a day  OLANZapine 10 milliGRAM(s) Oral daily  pantoprazole    Tablet 40 milliGRAM(s) Oral before breakfast  predniSONE   Tablet 5 milliGRAM(s) Oral daily  sertraline 100 milliGRAM(s) Oral daily  tiotropium 18 MICROgram(s) Capsule 1 Capsule(s) Inhalation daily    MEDICATIONS  (PRN):  acetaminophen   Tablet .. 650 milliGRAM(s) Oral every 4 hours PRN Mild Pain (1 - 3)    ___________  Active diet:  Diet, DASH/TLC:   Sodium & Cholesterol Restricted  ___________________    ==================>> VITAL SIGNS <<==================    Vital Signs Last 24 HrsT(C): 36.6 (01-12-21 @ 14:11)  T(F): 97.8 (01-12-21 @ 14:11), Max: 99.3 (01-12-21 @ 05:00)  HR: 98 (01-12-21 @ 14:11) (81 - 98)  BP: 137/79 (01-12-21 @ 14:11)  RR: 18 (01-12-21 @ 14:11) (18 - 18)  SpO2: 95% (01-12-21 @ 14:11) (94% - 95%)      POCT Blood Glucose.: 102 mg/dL (12 Jan 2021 17:27)  POCT Blood Glucose.: 94 mg/dL (11 Jan 2021 21:41)     ==================>> LAB AND IMAGING <<==================                        12.1   7.68  )-----------( 204      ( 12 Jan 2021 07:07 )             38.9        01-12    142  |  105  |  14  ----------------------------<  108<H>  3.3<L>   |  29  |  0.64    Ca    9.6      12 Jan 2021 07:07  Phos  1.9     01-12  Mg     1.7     01-12    TPro  7.0  /  Alb  3.0<L>  /  TBili  1.0  /  DBili  x   /  AST  18  /  ALT  19  /  AlkPhos  77  01-12    WBC count:   7.68 <<== ,  6.19 <<== ,  6.08 <<== ,  5.00 <<== ,  4.70 <<==   Hemoglobin:   12.1 <<==,  12.3 <<==,  12.4 <<==,  12.5 <<==,  11.5 <<==  platelets:  204 <==, 185 <==, 171 <==, 149 <==, 131 <==, 103 <==    Creatinine:  0.64  <<==, 0.60  <<==, 0.63  <<==, 0.64  <<==, 0.67  <<==, 0.63  <<==  Sodium:   142  <==, 144  <==, 145  <==, 145  <==, 143  <==, 142  <==       AST:          18 <== , 22 <== , 35 <== , 49 <== , 64 <==      ALT:        19  <== , 20  <== , 23  <== , 29  <== , 31  <==      AP:        77  <=, 73  <=, 70  <=, 66  <=, 60  <=     Bili:        1.0  <=, 0.8  <=, 0.8  <=, 0.8  <=, 0.5  <=    ^^^ Inflammatory markers :  ^^^  C R P :          5.01 (01-06-21)  <<--  P C T :         0.12 (01-06-21) <<--    Ferritin :         162 (01-11-21) <<--, 209 (01-06-21) <<--    D-Dimer :          369 (01-11-21) <<--, 573 (01-06-21) <<--    ___________________________________________________________________________________  ===============>>  A S S E S S M E N T   A N D   P L A N <<===============  ------------------------------------------------------------------------------------------  71 yo female with medical history significant for Anxiety, Asthma, Breast cancer in situ, COPD (chronic obstructive pulmonary disease). DVT (deep venous thrombosis) not presently on A/C, Graves disease, Hyperlipidemia, Hypertension, Hypothyroid, Parathyroidectomy, Kidney cancer, primary, with metastasis from kidney to other site,  Left sided nephrectomy only- no chemo or RT, Obesity, Sleep apnea comes in with dyspnea and fever for 2 days prior to admission.      Problem/Plan - 1:  ·  Problem: Acute hypoxemic respiratory failure due to COVID-19  finish Decadron per protocol  finish Remdesivir per protocol   Continue with Supplemental O2 > wean down as able  >> home O2  in process   Continue with supportive care.     Problem/Plan - 2:  ·  Problem:  possible Cellulitis  Pt with mild erythema and pain likely mostly stasis dermatitis   post abx : observe     Problem/Plan - 3:  ·  Problem: Graves disease.  Plan: Pt with exophthalmus  PSH Parathyroidectomy  Pt takes Prednisone 5 mg at home  Home dose presently on hold secondary to Decadron.     Problem/Plan - 4:  ·  Problem: Hypertension.  Plan: Controlled  Amlodipine resumed   Continue to monitor BP.     supplement hypokalemia / hypomagnesemia as needed  keep K~4, Mag ~2    Problem/Plan - 5:  Problem: Hypothyroid. Plan: TSH 0.78  Pt takes Synthroid 125 mcg at home  Continue with home regimen.    Problem/Plan - 6:  ·  Problem: COPD (chronic obstructive pulmonary disease).  Plan: Pt takes Spiriva 18 mcg QD at home  Continue with home dose  Continue with Decardon.     Problem/Plan - 7:  ·  Problem: Prophylactic measure.  Plan: Continue with SQ Lovenox.     Dispo plan as above  pt mostly bed / wheelchair bound due to debility and to go back home     --------------------------------------------  Case discussed with pt, NP  Education given on findings and plan of care  ___________________________  H. JANICE Welch.  Pager: [phone]  
I:  69 yo female with medical history significant for Anxiety, Asthma, Breast cancer in situ, COPD (chronic obstructive pulmonary disease). DVT (deep venous thrombosis) not presently on A/C, Graves disease, Hyperlipidemia, Hypertension, Hypothyroid, Parathyroidectomy, Kidney cancer, primary, with metastasis from kidney to other site,  LEft sided nephrectomy only- no chemo or RT, Obesity, Sleep apnea comes in  with dyspnea and fever for past 2 days.   She states that she has been feeling feverish and has chills with it. Did not measure temperature. Also has dyspnea more on exertion, progressive, associated with mild cough, whitish sputum.   Her  was tested positive for covid and is sick with similar symptoms. pt's son also coughing per pt   Denies abdominal pain, nausea, vomiting, diarrhea, chest pain, urinary symptoms.   covering for Dr Welch   seen and examined  vstable   physical done  not in any distress  denies sob  lungs clear  htn copd, ca breast, NOEMY.  dvt not On AC  out of bed in chair  Vit c znc  pt has multiple medical issue  por prognosispulse ox 149  pt has covid antibodies 1:20
  _________________________________________________________________________________________  ========>>  M E D I C A L   A T T E N D I N G    F O L L O W  U P  N O T E  <<=========  -----------------------------------------------------------------------------------------------------    - Patient seen and examined by me earlier today.   - In summary,  SHEKHAR VASQUEZ is a 70y year old woman admitted with SOB   - Patient today overall doing ok, comfortable, eating OK. breathing ok    ==================>> REVIEW OF SYSTEM <<=================    GEN: no fever, no chills, no pain  RESP: no signif  SOB, occasionalcough, no sputum  CVS: no chest pain, no palpitations, no edema  GI: no abdominal pain, no nausea, no constipation, no diarrhea  : no dysuria, no frequency, no hematuria  Neuro: no headache, no dizziness  Derm : no itching, no rash    ==================>> PHYSICAL EXAM <<=================    GEN: A&O X 3 , NAD , comfortable  HEENT: NCAT, PERRL, MMM, hearing intact  Neck: supple , no JVD appreciated  CVS: S1S2 , regular , No M/R/G appreciated  PULM: CTA B/L,  limited exam as not taking deep breaths   ABD.: soft. non tender, non distended,  bowel sounds present  Extrem: intact pulses , no edema   PSYCH : normal mood,  somewhat anxious       ==================>> MEDICATIONS <<====================    ALBUTerol    90 MICROgram(s) HFA Inhaler 1 Puff(s) Inhalation every 4 hours  aspirin enteric coated 81 milliGRAM(s) Oral daily  atorvastatin 40 milliGRAM(s) Oral at bedtime  ceFAZolin   IVPB 1000 milliGRAM(s) IV Intermittent every 8 hours  ceFAZolin   IVPB      cholecalciferol 1000 Unit(s) Oral daily  dexAMETHasone  Injectable 6 milliGRAM(s) IV Push daily  enoxaparin Injectable 40 milliGRAM(s) SubCutaneous daily  furosemide    Tablet 20 milliGRAM(s) Oral daily  gabapentin 100 milliGRAM(s) Oral daily  hydrochlorothiazide 25 milliGRAM(s) Oral daily  levothyroxine 125 MICROGram(s) Oral daily  losartan 100 milliGRAM(s) Oral daily  montelukast 10 milliGRAM(s) Oral daily  nystatin Powder 1 Application(s) Topical two times a day  OLANZapine 10 milliGRAM(s) Oral daily  pantoprazole    Tablet 40 milliGRAM(s) Oral before breakfast  sertraline 100 milliGRAM(s) Oral daily  tiotropium 18 MICROgram(s) Capsule 1 Capsule(s) Inhalation daily    ___________  Active diet:  Diet, DASH/TLC:   Sodium & Cholesterol Restricted  ___________________    ==================>> VITAL SIGNS <<==================    Height (cm): 152.4  Weight (kg): 109  BMI (kg/m2): 46.9  Vital Signs Last 24 HrsT(C): 37.1 (01-08-21 @ 05:26)  T(F): 98.7 (01-08-21 @ 05:26), Max: 98.7 (01-08-21 @ 05:26)  HR: 75 (01-08-21 @ 05:26) (73 - 78)  BP: 123/87 (01-08-21 @ 05:26)  RR: 18 (01-08-21 @ 05:26) (18 - 18)  SpO2: 95% (01-08-21 @ 05:26) (95% - 99%)      POCT Blood Glucose.: 127 mg/dL (08 Jan 2021 11:53)  POCT Blood Glucose.: 95 mg/dL (08 Jan 2021 08:17)     ==================>> LAB AND IMAGING <<==================                        11.5   4.70  )-----------( 131      ( 08 Jan 2021 07:24 )             37.2        01-08    143  |  105  |  18  ----------------------------<  75  3.4<L>   |  29  |  0.67    Ca    8.8      08 Jan 2021 07:24  Phos  2.2     01-08  Mg     1.8     01-08    TPro  6.6  /  Alb  2.8<L>  /  TBili  0.5  /  DBili  0.1  /  AST  64<H>  /  ALT  31  /  AlkPhos  60  01-08    WBC count:   4.70 <<== ,  4.63 <<== ,  5.43 <<==   Hemoglobin:   11.5 <<==,  11.2 <<==,  11.3 <<==  platelets:  131 <==, 103 <==, 107 <==    Creatinine:  0.67  <<==, 0.63  <<==, 0.82  <<==  Sodium:   143  <==, 142  <==, 139  <==       AST:          64 <== , 68 <== , 49 <==      ALT:        31  <== , 36  <== , 33  <==      AP:        60  <=, 62  <=, 68  <=     Bili:        0.5  <=, 0.5  <=, 0.6  <=    ^^^ Inflammatory markers :  ^^^  C R P :          5.01 (01-06-21)  <<--  P C T :         0.12 (01-06-21) <<--    Ferritin :         209 (01-06-21) <<--    D-Dimer :          573 (01-06-21) <<--    _______________________  C U L T U R E S :    Culture - Blood (collected 06 Jan 2021 13:31)  Source: .Blood Blood-Peripheral  Preliminary Report (07 Jan 2021 14:02):    No growth to date.    Culture - Blood (collected 06 Jan 2021 13:31)  Source: .Blood Blood-Peripheral  Preliminary Report (07 Jan 2021 14:02):    No growth to date.      COVID-19 IgG Antibody Index: 1.20 (01-06-21 @ 17:52)    COVID-19 PCR: Detected (01-06-21 @ 10:29)    ___________________________________________________________________________________  ===============>>  A S S E S S M E N T   A N D   P L A N <<===============  ------------------------------------------------------------------------------------------  69 yo female with medical history significant for Anxiety, Asthma, Breast cancer in situ, COPD (chronic obstructive pulmonary disease). DVT (deep venous thrombosis) not presently on A/C, Graves disease, Hyperlipidemia, Hypertension, Hypothyroid, Parathyroidectomy, Kidney cancer, primary, with metastasis from kidney to other site,  Left sided nephrectomy only- no chemo or RT, Obesity, Sleep apnea comes in with dyspnea and fever for 2 days prior to admission.      Problem/Plan - 1:  ·  Problem: Acute hypoxemic respiratory failure due to COVID-19  Continue with Decadron per protocol  Continue with Remdesivir per protocol   Continue with Supplemental O2 > wean down as able  >> ? consider home O2 ?   Continue with supportive care.     Problem/Plan - 2:  ·  Problem:  possible Cellulitis  Pt with mild erythema and pain likely mostly stasis dermatitis   Continue with cefazolin (day 3). > likely short 5 day course and DC    Problem/Plan - 3:  ·  Problem: Graves disease.  Plan: Pt with exophthalmus  PSH Parathyroidectomy  Pt takes Prednisone 5 mg at home  Home dose presently on hold secondary to Decadron.     Problem/Plan - 4:  ·  Problem: Hypertension.  Plan: Controlled  Pt takes Amlodipine 5 mg, Losartan 100 mg, HCTZ 25 mg and Furosemide 20 mg at home  Continue with Losartan, HCTZ and Furosemide with parameters  Amlodipine currently on hold for LE edema, resume when medically appropriate  Continue to monitor BP.     Problem/Plan - 5:  Problem: Hypothyroid. Plan: TSH 0.78  Pt takes Synthroid 125 mcg at home  Continue with home regimen.    Problem/Plan - 6:  ·  Problem: COPD (chronic obstructive pulmonary disease).  Plan: Pt takes Spiriva 18 mcg QD at home  Continue with home dose  Continue with Decardon.     Problem/Plan - 7:  ·  Problem: Prophylactic measure.  Plan: Continue with SQ Lovenox.     --------------------------------------------  Case discussed with pt, NP  Education given on findings and plan of care  ___________________________  HRenetta Welch D.O.  Pager: [phone]  
  _________________________________________________________________________________________  ========>>  M E D I C A L   A T T E N D I N G    F O L L O W  U P  N O T E  <<=========  -----------------------------------------------------------------------------------------------------    - Patient seen and examined by me earlier today.   - In summary,  SHEKHAR VASQUEZ is a 70y year old woman admitted with SOB   - Patient today overall doing ok, comfortable, eating OK. breathing ok    ==================>> REVIEW OF SYSTEM <<=================    GEN: no fever, no chills, no pain  RESP: no signif  SOB, occasionalcough, no sputum  CVS: no chest pain, no palpitations, no edema  GI: no abdominal pain, no nausea, no constipation, no diarrhea  : no dysuria, no frequency, no hematuria  Neuro: no headache, no dizziness  Derm : no itching, no rash    ==================>> PHYSICAL EXAM <<=================    GEN: A&O X 3 , NAD , comfortable  HEENT: NCAT, PERRL, MMM, hearing intact  Neck: supple , no JVD appreciated  CVS: S1S2 , regular , No M/R/G appreciated  PULM: CTA B/L,  no wheezing   ABD.: soft. non tender, non distended,  bowel sounds present  Extrem: intact pulses , no edema   PSYCH : normal mood,  not anxious      ==================>> MEDICATIONS <<====================    ALBUTerol    90 MICROgram(s) HFA Inhaler 1 Puff(s) Inhalation every 4 hours  aspirin enteric coated 81 milliGRAM(s) Oral daily  atorvastatin 40 milliGRAM(s) Oral at bedtime  ceFAZolin   IVPB 1000 milliGRAM(s) IV Intermittent every 8 hours  ceFAZolin   IVPB      cholecalciferol 1000 Unit(s) Oral daily  dexAMETHasone  Injectable 6 milliGRAM(s) IV Push daily  enoxaparin Injectable 40 milliGRAM(s) SubCutaneous daily  furosemide    Tablet 20 milliGRAM(s) Oral daily  gabapentin 100 milliGRAM(s) Oral daily  hydrochlorothiazide 25 milliGRAM(s) Oral daily  levothyroxine 125 MICROGram(s) Oral daily  losartan 100 milliGRAM(s) Oral daily  montelukast 10 milliGRAM(s) Oral daily  nystatin Powder 1 Application(s) Topical two times a day  OLANZapine 10 milliGRAM(s) Oral daily  pantoprazole    Tablet 40 milliGRAM(s) Oral before breakfast  sertraline 100 milliGRAM(s) Oral daily  tiotropium 18 MICROgram(s) Capsule 1 Capsule(s) Inhalation daily    ___________  Active diet:  Diet, DASH/TLC:   Sodium & Cholesterol Restricted  ___________________    ==================>> VITAL SIGNS <<==================    Vital Signs Last 24 HrsT(C): 37.1 (01-10-21 @ 14:30)  T(F): 98.7 (01-10-21 @ 14:30), Max: 98.7 (01-10-21 @ 14:30)  HR: 65 (01-10-21 @ 14:30) (65 - 71)  BP: 137/59 (01-10-21 @ 14:30)  RR: 18 (01-10-21 @ 14:30) (17 - 18)  SpO2: 96% (01-10-21 @ 14:30) (95% - 96%)      POCT Blood Glucose.: 128 mg/dL (10 Alan 2021 17:39)  POCT Blood Glucose.: 138 mg/dL (10 Alan 2021 11:50)  POCT Blood Glucose.: 110 mg/dL (09 Jan 2021 21:34)     ==================>> LAB AND IMAGING <<==================                        12.4   6.08  )-----------( 171      ( 10 Alan 2021 07:46 )             40.0        01-10    145  |  106  |  16  ----------------------------<  115<H>  3.1<L>   |  31  |  0.63    Ca    9.3      10 Alan 2021 07:46    TPro  7.1  /  Alb  2.9<L>  /  TBili  0.8  /  DBili  0.2  /  AST  35  /  ALT  23  /  AlkPhos  70  01-10    WBC count:   6.08 <<== ,  5.00 <<== ,  4.70 <<== ,  4.63 <<== ,  5.43 <<==   Hemoglobin:   12.4 <<==,  12.5 <<==,  11.5 <<==,  11.2 <<==,  11.3 <<==  platelets:  171 <==, 149 <==, 131 <==, 103 <==, 107 <==    Creatinine:  0.63  <<==, 0.64  <<==, 0.67  <<==, 0.63  <<==, 0.82  <<==  Sodium:   145  <==, 145  <==, 143  <==, 142  <==, 139  <==       AST:          35 <== , 49 <== , 64 <== , 68 <== , 49 <==      ALT:        23  <== , 29  <== , 31  <== , 36  <== , 33  <==      AP:        70  <=, 66  <=, 60  <=, 62  <=, 68  <=     Bili:        0.8  <=, 0.8  <=, 0.5  <=, 0.5  <=, 0.6  <=    ^^^ Inflammatory markers :  ^^^  C R P :          5.01 (01-06-21)  <<--  P C T :         0.12 (01-06-21) <<--    Ferritin :         209 (01-06-21) <<--    D-Dimer :          573 (01-06-21) <<--    _______________________  C U L T U R E S :    Culture - Blood (collected 06 Jan 2021 13:31)  Source: .Blood Blood-Peripheral  Preliminary Report (07 Jan 2021 14:02):    No growth to date.    Culture - Blood (collected 06 Jan 2021 13:31)  Source: .Blood Blood-Peripheral  Preliminary Report (07 Jan 2021 14:02):    No growth to date.    COVID-19 IgG Antibody Index: 1.20 (01-06-21 @ 17:52)  COVID-19 PCR: Detected (01-06-21 @ 10:29)    ___________________________________________________________________________________  ===============>>  A S S E S S M E N T   A N D   P L A N <<===============  ------------------------------------------------------------------------------------------  71 yo female with medical history significant for Anxiety, Asthma, Breast cancer in situ, COPD (chronic obstructive pulmonary disease). DVT (deep venous thrombosis) not presently on A/C, Graves disease, Hyperlipidemia, Hypertension, Hypothyroid, Parathyroidectomy, Kidney cancer, primary, with metastasis from kidney to other site,  Left sided nephrectomy only- no chemo or RT, Obesity, Sleep apnea comes in with dyspnea and fever for 2 days prior to admission.      Problem/Plan - 1:  ·  Problem: Acute hypoxemic respiratory failure due to COVID-19  finish Decadron per protocol  finish Remdesivir per protocol   Continue with Supplemental O2 > wean down as able  >> ? consider home O2 ?   Continue with supportive care.     Problem/Plan - 2:  ·  Problem:  possible Cellulitis  Pt with mild erythema and pain likely mostly stasis dermatitis   post abx : observe     Problem/Plan - 3:  ·  Problem: Graves disease.  Plan: Pt with exophthalmus  PSH Parathyroidectomy  Pt takes Prednisone 5 mg at home  Home dose presently on hold secondary to Decadron.     Problem/Plan - 4:  ·  Problem: Hypertension.  Plan: Controlled  Pt takes Amlodipine 5 mg, Losartan 100 mg, HCTZ 25 mg and Furosemide 20 mg at home  Continue with Losartan, HCTZ and Furosemide with parameters  Amlodipine currently on hold for LE edema, resume when medically appropriate  Continue to monitor BP.     supplement hypokalemia / hypomagnesemia as needed  keep K~4, Mag ~2    Problem/Plan - 5:  Problem: Hypothyroid. Plan: TSH 0.78  Pt takes Synthroid 125 mcg at home  Continue with home regimen.    Problem/Plan - 6:  ·  Problem: COPD (chronic obstructive pulmonary disease).  Plan: Pt takes Spiriva 18 mcg QD at home  Continue with home dose  Continue with Decardon.     Problem/Plan - 7:  ·  Problem: Prophylactic measure.  Plan: Continue with SQ Lovenox.     Dispo plan as above  pt mostly bed / wheelchair bound due to debility and to go back home     --------------------------------------------  Case discussed with pt  Education given on findings and plan of care  ___________________________  DIANE Welch D.O.  Pager: [phone]  
                                            M E D I C A L   A T T E N D I N G    F O L L O W    U P   N O T E                                     ------------------------------------------------------------------------------------------------    patient evaluated by me, case discussed with team, chart, medications, and physical exam reviewed, labs / tests  and vitals reviewed by me, as remedios.   Patient is stable for discharge today with home o2 as set up .  Patient to follow up with  PMD  See discharge document for full note.      ==================>> MEDICATIONS <<====================    ALBUTerol    90 MICROgram(s) HFA Inhaler 1 Puff(s) Inhalation every 4 hours  amLODIPine   Tablet 5 milliGRAM(s) Oral daily  aspirin enteric coated 81 milliGRAM(s) Oral daily  atorvastatin 40 milliGRAM(s) Oral at bedtime  cholecalciferol 1000 Unit(s) Oral daily  enoxaparin Injectable 40 milliGRAM(s) SubCutaneous daily  furosemide    Tablet 20 milliGRAM(s) Oral daily  gabapentin 100 milliGRAM(s) Oral daily  hydrochlorothiazide 25 milliGRAM(s) Oral daily  levothyroxine 125 MICROGram(s) Oral daily  losartan 100 milliGRAM(s) Oral daily  montelukast 10 milliGRAM(s) Oral daily  nystatin Powder 1 Application(s) Topical two times a day  OLANZapine 10 milliGRAM(s) Oral daily  pantoprazole    Tablet 40 milliGRAM(s) Oral before breakfast  predniSONE   Tablet 5 milliGRAM(s) Oral daily  sertraline 100 milliGRAM(s) Oral daily  tiotropium 18 MICROgram(s) Capsule 1 Capsule(s) Inhalation daily    MEDICATIONS  (PRN):  acetaminophen   Tablet .. 650 milliGRAM(s) Oral every 4 hours PRN Mild Pain (1 - 3)    ___________  Active diet:  Diet, DASH/TLC:   Sodium & Cholesterol Restricted  ___________________    ==================>> VITAL SIGNS <<==================    T(C): 36.6 (01-13-21 @ 19:36), Max: 36.9 (01-13-21 @ 04:48)  HR: 100 (01-13-21 @ 19:36) (71 - 112)  BP: 128/70 (01-13-21 @ 19:36) (112/75 - 153/74)  BP(mean): --  RR: 19 (01-13-21 @ 19:36) (16 - 19)  SpO2: 96% (01-13-21 @ 19:36) (96% - 99%)   CAPILLARY BLOOD GLUCOSE      POCT Blood Glucose.: 93 mg/dL (13 Jan 2021 17:17)  POCT Blood Glucose.: 123 mg/dL (13 Jan 2021 11:23)  POCT Blood Glucose.: 113 mg/dL (13 Jan 2021 08:13)  POCT Blood Glucose.: 125 mg/dL (12 Jan 2021 21:53)  I&O's Summary     ==================>> LAB AND IMAGING <<==================                        11.9   8.29  )-----------( 185      ( 13 Jan 2021 07:23 )             39.3        01-13    140  |  104  |  16  ----------------------------<  115<H>  3.7   |  29  |  0.73    Ca    9.5      13 Jan 2021 07:23  Phos  2.3     01-13  Mg     1.7     01-13    TPro  6.9  /  Alb  2.7<L>  /  TBili  1.1  /  DBili  x   /  AST  19  /  ALT  17  /  AlkPhos  74  01-13                     TSH:      0.78   (01-06-21)           Lipid profile:  (01-06-21)     Total: 150     LDL  : (p)     HDL  :45     TG   :129     HgA1C:   (01-06-21)          (01-06-21)      5.4  WBC count:   8.29 <<== ,  7.68 <<== ,  6.19 <<== ,  6.08 <<== ,  5.00 <<==   Hemoglobin:   11.9 <<==,  12.1 <<==,  12.3 <<==,  12.4 <<==,  12.5 <<==  platelets:  185 <==, 204 <==, 185 <==, 171 <==, 149 <==, 131 <==    Creatinine:  0.73  <<==, 0.64  <<==, 0.60  <<==, 0.63  <<==, 0.64  <<==, 0.67  <<==  Sodium:   140  <==, 142  <==, 144  <==, 145  <==, 145  <==, 143  <==       AST:          19 <== , 18 <== , 22 <== , 35 <== , 49 <==      ALT:        17  <== , 19  <== , 20  <== , 23  <== , 29  <==      AP:        74  <=, 77  <=, 73  <=, 70  <=, 66  <=     Bili:        1.1  <=, 1.0  <=, 0.8  <=, 0.8  <=, 0.8  <=       
  _________________________________________________________________________________________  ========>>  M E D I C A L   A T T E N D I N G    F O L L O W  U P  N O T E  <<=========  -----------------------------------------------------------------------------------------------------    - Patient seen and examined by me earlier today.   - In summary,  SHEKHAR VASQUEZ is a 70y year old woman admitted with SOB   - Patient today overall doing ok, comfortable, eating OK. breathing ok    ==================>> REVIEW OF SYSTEM <<=================    GEN: no fever, no chills, no pain  RESP: no signif  SOB, occasionalcough, no sputum  CVS: no chest pain, no palpitations, no edema  GI: no abdominal pain, no nausea  : no dysuria, no frequency, no hematuria  Neuro: no headache, no dizziness  Derm : no itching, no rash    ==================>> PHYSICAL EXAM <<=================    GEN: A&O X 3 , NAD , comfortable  HEENT: NCAT, PERRL, MMM, hearing intact  Neck: supple , no JVD appreciated  CVS: S1S2 , regular , No M/R/G appreciated  PULM: CTA B/L,  no wheezing   ABD.: soft. non tender, non distended,  bowel sounds present  Extrem: intact pulses , no edema   PSYCH : normal mood,  not anxious      ==================>> MEDICATIONS <<====================    ALBUTerol    90 MICROgram(s) HFA Inhaler 1 Puff(s) Inhalation every 4 hours  aspirin enteric coated 81 milliGRAM(s) Oral daily  atorvastatin 40 milliGRAM(s) Oral at bedtime  cholecalciferol 1000 Unit(s) Oral daily  enoxaparin Injectable 40 milliGRAM(s) SubCutaneous daily  furosemide    Tablet 20 milliGRAM(s) Oral daily  gabapentin 100 milliGRAM(s) Oral daily  hydrochlorothiazide 25 milliGRAM(s) Oral daily  levothyroxine 125 MICROGram(s) Oral daily  losartan 100 milliGRAM(s) Oral daily  montelukast 10 milliGRAM(s) Oral daily  nystatin Powder 1 Application(s) Topical two times a day  OLANZapine 10 milliGRAM(s) Oral daily  pantoprazole    Tablet 40 milliGRAM(s) Oral before breakfast  sertraline 100 milliGRAM(s) Oral daily  tiotropium 18 MICROgram(s) Capsule 1 Capsule(s) Inhalation daily  __________  Active diet:  Diet, DASH/TLC:   Sodium & Cholesterol Restricted  ___________________    ==================>> VITAL SIGNS <<==================    Vital Signs Last 24 HrsT(C): 36.3 (01-11-21 @ 05:44)  T(F): 97.4 (01-11-21 @ 05:44), Max: 98.9 (01-10-21 @ 21:54)  HR: 69 (01-11-21 @ 05:44) (65 - 71)  BP: 151/68 (01-11-21 @ 05:44)  RR: 18 (01-11-21 @ 05:44) (18 - 18)  SpO2: 96% (01-11-21 @ 05:44) (96% - 97%)      POCT Blood Glucose.: 131 mg/dL (11 Jan 2021 11:35)  POCT Blood Glucose.: 119 mg/dL (11 Jan 2021 07:57)  POCT Blood Glucose.: 120 mg/dL (10 Alan 2021 21:37)  POCT Blood Glucose.: 128 mg/dL (10 Alan 2021 17:39)     ==================>> LAB AND IMAGING <<==================                        12.3   6.19  )-----------( 185      ( 11 Jan 2021 07:01 )             40.2        01-11    144  |  105  |  17  ----------------------------<  95  2.8<LL>   |  31  |  0.60    Ca    9.4      11 Jan 2021 07:01    TPro  6.9  /  Alb  2.9<L>  /  TBili  0.8  /  DBili  0.2  /  AST  22  /  ALT  20  /  AlkPhos  73  01-11    WBC count:   6.19 <<== ,  6.08 <<== ,  5.00 <<== ,  4.70 <<== ,  4.63 <<==   Hemoglobin:   12.3 <<==,  12.4 <<==,  12.5 <<==,  11.5 <<==,  11.2 <<==  platelets:  185 <==, 171 <==, 149 <==, 131 <==, 103 <==, 107 <==    Creatinine:  0.60  <<==, 0.63  <<==, 0.64  <<==, 0.67  <<==, 0.63  <<==, 0.82  <<==  Sodium:   144  <==, 145  <==, 145  <==, 143  <==, 142  <==, 139  <==       AST:          22 <== , 35 <== , 49 <== , 64 <== , 68 <==      ALT:        20  <== , 23  <== , 29  <== , 31  <== , 36  <==      AP:        73  <=, 70  <=, 66  <=, 60  <=, 62  <=     Bili:        0.8  <=, 0.8  <=, 0.8  <=, 0.5  <=, 0.5  <=    ^^^ Inflammatory markers :  ^^^  C R P :          5.01 (01-06-21)  <<--  P C T :         0.12 (01-06-21) <<--    Ferritin :         162 (01-11-21) <<--, 209 (01-06-21) <<--    D-Dimer :          369 (01-11-21) <<--, 573 (01-06-21) <<--  ___________________________________________________________________________________  ===============>>  A S S E S S M E N T   A N D   P L A N <<===============  ------------------------------------------------------------------------------------------  71 yo female with medical history significant for Anxiety, Asthma, Breast cancer in situ, COPD (chronic obstructive pulmonary disease). DVT (deep venous thrombosis) not presently on A/C, Graves disease, Hyperlipidemia, Hypertension, Hypothyroid, Parathyroidectomy, Kidney cancer, primary, with metastasis from kidney to other site,  Left sided nephrectomy only- no chemo or RT, Obesity, Sleep apnea comes in with dyspnea and fever for 2 days prior to admission.      Problem/Plan - 1:  ·  Problem: Acute hypoxemic respiratory failure due to COVID-19  finish Decadron per protocol  finish Remdesivir per protocol   Continue with Supplemental O2 > wean down as able  >> ? consider home O2  / safe discharge planing   Continue with supportive care.     Problem/Plan - 2:  ·  Problem:  possible Cellulitis  Pt with mild erythema and pain likely mostly stasis dermatitis   post abx : observe     Problem/Plan - 3:  ·  Problem: Graves disease.  Plan: Pt with exophthalmus  PSH Parathyroidectomy  Pt takes Prednisone 5 mg at home  Home dose presently on hold secondary to Decadron.     Problem/Plan - 4:  ·  Problem: Hypertension.  Plan: Controlled  Pt takes Amlodipine 5 mg, Losartan 100 mg, HCTZ 25 mg and Furosemide 20 mg at home  Continue with Losartan, HCTZ and Furosemide with parameters  Amlodipine currently on hold for LE edema, resume when medically appropriate  Continue to monitor BP.     supplement hypokalemia / hypomagnesemia as needed  keep K~4, Mag ~2    Problem/Plan - 5:  Problem: Hypothyroid. Plan: TSH 0.78  Pt takes Synthroid 125 mcg at home  Continue with home regimen.    Problem/Plan - 6:  ·  Problem: COPD (chronic obstructive pulmonary disease).  Plan: Pt takes Spiriva 18 mcg QD at home  Continue with home dose  Continue with Decardon.     Problem/Plan - 7:  ·  Problem: Prophylactic measure.  Plan: Continue with SQ Lovenox.     Dispo plan as above  pt mostly bed / wheelchair bound due to debility and to go back home     --------------------------------------------  Case discussed with pt, NP  Education given on findings and plan of care  ___________________________  H. JANICE Welch.  Pager: [phone]  
  _________________________________________________________________________________________  ========>>  M E D I C A L   A T T E N D I N G    F O L L O W  U P  N O T E  <<=========  -----------------------------------------------------------------------------------------------------    - Patient seen and examined by me earlier today.   - In summary,  SHEKHAR VASQUEZ is a 70y year old woman admitted with SOB   - Patient today overall doing ok, comfortable, eating OK. breathing ok    ==================>> REVIEW OF SYSTEM <<=================    GEN: no fever, no chills, no pain  RESP: no signif  SOB, occasionalcough, no sputum  CVS: no chest pain, no palpitations, no edema  GI: no abdominal pain, no nausea, no constipation, no diarrhea  : no dysuria, no frequency, no hematuria  Neuro: no headache, no dizziness  Derm : no itching, no rash    ==================>> PHYSICAL EXAM <<=================    GEN: A&O X 3 , NAD , comfortable  HEENT: NCAT, PERRL, MMM, hearing intact  Neck: supple , no JVD appreciated  CVS: S1S2 , regular , No M/R/G appreciated  PULM: CTA B/L,  limited exam as not taking deep breaths   ABD.: soft. non tender, non distended,  bowel sounds present  Extrem: intact pulses , no edema   PSYCH : normal mood,  somewhat anxious      ==================>> MEDICATIONS <<====================    MEDICATIONS  (STANDING):  ALBUTerol    90 MICROgram(s) HFA Inhaler 1 Puff(s) Inhalation every 4 hours  aspirin enteric coated 81 milliGRAM(s) Oral daily  atorvastatin 40 milliGRAM(s) Oral at bedtime  ceFAZolin   IVPB 1000 milliGRAM(s) IV Intermittent every 8 hours  ceFAZolin   IVPB      cholecalciferol 1000 Unit(s) Oral daily  dexAMETHasone  Injectable 6 milliGRAM(s) IV Push daily  enoxaparin Injectable 40 milliGRAM(s) SubCutaneous daily  furosemide    Tablet 20 milliGRAM(s) Oral daily  gabapentin 100 milliGRAM(s) Oral daily  hydrochlorothiazide 25 milliGRAM(s) Oral daily  levothyroxine 125 MICROGram(s) Oral daily  losartan 100 milliGRAM(s) Oral daily  montelukast 10 milliGRAM(s) Oral daily  OLANZapine 10 milliGRAM(s) Oral daily  pantoprazole    Tablet 40 milliGRAM(s) Oral before breakfast  remdesivir  IVPB 100 milliGRAM(s) IV Intermittent every 24 hours  remdesivir  IVPB   IV Intermittent   sertraline 100 milliGRAM(s) Oral daily  tiotropium 18 MICROgram(s) Capsule 1 Capsule(s) Inhalation daily     ___________  Active diet:  Diet, DASH/TLC:   Sodium & Cholesterol Restricted  ___________________    ==================>> VITAL SIGNS <<==================    T(C): 37 (01-07-21 @ 21:24), Max: 37.2 (01-07-21 @ 05:49)  HR: 73 (01-07-21 @ 21:24) (72 - 78)  BP: 136/67 (01-07-21 @ 21:24) (122/52 - 138/66)  RR: 18 (01-07-21 @ 21:24) (16 - 18)  SpO2: 95% (01-07-21 @ 21:24) (95% - 99%)      ==================>> LAB AND IMAGING <<==================                        11.2   4.63  )-----------( 103      ( 07 Jan 2021 08:14 )             35.7        01-07    142  |  106  |  19<H>  ----------------------------<  89  3.6   |  28  |  0.63    Ca    8.7      07 Jan 2021 08:14  Mg     1.8     01-07    TPro  6.8  /  Alb  2.8<L>  /  TBili  0.5  /  DBili  x   /  AST  68<H>  /  ALT  36  /  AlkPhos  62  01-07    PT/INR - ( 06 Jan 2021 10:29 )   PT: 14.4 sec;   INR: 1.22 ratio       TSH:      0.78   (01-06-21)           Lipid profile:  (01-06-21)     Total: 150     LDL  : (p)     HDL  :45     TG   :129     HgA1C:     (01-06-21)      5.4    ^^^ Inflammatory markers :  ^^^  C R P :          5.01 (01-06-21)  <<--  P C T :         0.12 (01-06-21) <<--    Ferritin :         209 (01-06-21) <<--    D-Dimer :          573 (01-06-21) <<--    ___________________________________________________________________________________  ===============>>  A S S E S S M E N T   A N D   P L A N <<===============  ------------------------------------------------------------------------------------------  71 yo female with medical history significant for Anxiety, Asthma, Breast cancer in situ, COPD (chronic obstructive pulmonary disease). DVT (deep venous thrombosis) not presently on A/C, Graves disease, Hyperlipidemia, Hypertension, Hypothyroid, Parathyroidectomy, Kidney cancer, primary, with metastasis from kidney to other site,  Left sided nephrectomy only- no chemo or RT, Obesity, Sleep apnea comes in with dyspnea and fever for 2 days prior to admission.      Problem/Plan - 1:  ·  Problem: Acute hypoxemic respiratory failure due to COVID-19  Continue with Decadron per protocol  Continue with Remdesivir per protocol   Continue with Supplemental O2 > wean down as able   Continue with supportive care.     Problem/Plan - 2:  ·  Problem:  possible Cellulitis  Pt with mild erythema and pain likely mostly stasis dermatitis   Continue with cefazolin (day 2). > likely short 5 day course and DC    Problem/Plan - 3:  ·  Problem: Graves disease.  Plan: Pt with exophthalmus  PSH Parathyroidectomy  Pt takes Prednisone 5 mg at home  Home dose presently on hold secondary to Decadron.     Problem/Plan - 4:  ·  Problem: Hypertension.  Plan: Controlled  Pt takes Amlodipine 5 mg, Losartan 100 mg, HCTZ 25 mg and Furosemide 20 mg at home  Continue with Losartan, HCTZ and Furosemide with parameters  Amlodipine currently on hold for LE edema, resume when medically appropriate  Continue to monitor BP.     Problem/Plan - 5:  Problem: Hypothyroid. Plan: TSH 0.78  Pt takes Synthroid 125 mcg at home  Continue with home regimen.    Problem/Plan - 6:  ·  Problem: COPD (chronic obstructive pulmonary disease).  Plan: Pt takes Spiriva 18 mcg QD at home  Continue with home dose  Continue with Decardon.     Problem/Plan - 7:  ·  Problem: Prophylactic measure.  Plan: Continue with SQ Lovenox.     --------------------------------------------  Case discussed with pt  Education given on findings and plan of care  ___________________________  H. JANICE Welch.  Pager: [phone]  
69 yo female with medical history significant for Anxiety, Asthma, Breast cancer in situ, COPD (chronic obstructive pulmonary disease). DVT (deep venous thrombosis) not presently on A/C, Graves disease, Hyperlipidemia, Hypertension, Hypothyroid, Parathyroidectomy, Kidney cancer, primary, with metastasis from kidney to other site,  Left sided nephrectomy only- no chemo or RT, Obesity, Sleep apnea comes in with dyspnea and fever for 2 days prior to admission.  Pt now satting 97% on 2L.  Has started Decadron and remdesivir, however, remdesivir D/c as pt is Covid antibody positive. Pt admitted for acute hypoxia 2/2 to covid PNA, likely mild to moderate case. CXR with no active infiltrate.

## 2021-01-13 NOTE — DIETITIAN INITIAL EVALUATION ADULT. - FEEDING ASSISTANCE
Provide food choices within diet Rx as available/updated Provide food choices within diet Rx as available/updated.

## 2021-01-13 NOTE — DIETITIAN INITIAL EVALUATION ADULT. - PERTINENT MEDS FT
MEDICATIONS  (STANDING):  ALBUTerol    90 MICROgram(s) HFA Inhaler 1 Puff(s) Inhalation every 4 hours  amLODIPine   Tablet 5 milliGRAM(s) Oral daily  aspirin enteric coated 81 milliGRAM(s) Oral daily  atorvastatin 40 milliGRAM(s) Oral at bedtime  cholecalciferol 1000 Unit(s) Oral daily  enoxaparin Injectable 40 milliGRAM(s) SubCutaneous daily  furosemide    Tablet 20 milliGRAM(s) Oral daily  gabapentin 100 milliGRAM(s) Oral daily  hydrochlorothiazide 25 milliGRAM(s) Oral daily  levothyroxine 125 MICROGram(s) Oral daily  losartan 100 milliGRAM(s) Oral daily  montelukast 10 milliGRAM(s) Oral daily  nystatin Powder 1 Application(s) Topical two times a day  OLANZapine 10 milliGRAM(s) Oral daily  pantoprazole    Tablet 40 milliGRAM(s) Oral before breakfast  predniSONE   Tablet 5 milliGRAM(s) Oral daily  sertraline 100 milliGRAM(s) Oral daily  tiotropium 18 MICROgram(s) Capsule 1 Capsule(s) Inhalation daily

## 2021-01-13 NOTE — DISCHARGE NOTE NURSING/CASE MANAGEMENT/SOCIAL WORK - PATIENT PORTAL LINK FT
You can access the FollowMyHealth Patient Portal offered by [facility] by registering at the following website: [email or web address]. By joining [facility]’s FollowMyHealth portal, you will also be able to view your health information using other applications (apps) compatible with our system.

## 2021-01-13 NOTE — DIETITIAN NUTRITION RISK NOTIFICATION - TREATMENT: THE FOLLOWING DIET HAS BEEN RECOMMENDED
Continue diet Rx as ordered :  Diet, DASH/TLC:   Sodium & Cholesterol Restricted (01-06-21 @ 12:59) [Active]

## 2021-01-13 NOTE — PROGRESS NOTE ADULT - PROVIDER SPECIALTY LIST ADULT
Internal Medicine

## 2021-01-14 ENCOUNTER — TRANSCRIPTION ENCOUNTER (OUTPATIENT)
Age: 71
End: 2021-01-14

## 2021-01-14 LAB
GLUCOSE BLDC GLUCOMTR-MCNC: 118 MG/DL — HIGH (ref 70–99)
GLUCOSE BLDC GLUCOMTR-MCNC: 125 MG/DL — HIGH (ref 70–99)
GLUCOSE BLDC GLUCOMTR-MCNC: 97 MG/DL — SIGNIFICANT CHANGE UP (ref 70–99)

## 2021-01-14 PROCEDURE — 99443: CPT | Mod: 95

## 2021-01-15 ENCOUNTER — TRANSCRIPTION ENCOUNTER (OUTPATIENT)
Age: 71
End: 2021-01-15

## 2021-01-17 ENCOUNTER — INPATIENT (INPATIENT)
Facility: HOSPITAL | Age: 71
LOS: 3 days | Discharge: HOME HEALTH | End: 2021-01-21
Attending: GENERAL PRACTICE | Admitting: GENERAL PRACTICE
Payer: MEDICARE

## 2021-01-17 ENCOUNTER — TRANSCRIPTION ENCOUNTER (OUTPATIENT)
Age: 71
End: 2021-01-17

## 2021-01-17 VITALS
HEIGHT: 60 IN | DIASTOLIC BLOOD PRESSURE: 89 MMHG | OXYGEN SATURATION: 93 % | HEART RATE: 110 BPM | SYSTOLIC BLOOD PRESSURE: 116 MMHG | TEMPERATURE: 98 F | RESPIRATION RATE: 22 BRPM

## 2021-01-17 DIAGNOSIS — Z98.89 OTHER SPECIFIED POSTPROCEDURAL STATES: Chronic | ICD-10-CM

## 2021-01-17 DIAGNOSIS — Z90.49 ACQUIRED ABSENCE OF OTHER SPECIFIED PARTS OF DIGESTIVE TRACT: Chronic | ICD-10-CM

## 2021-01-17 DIAGNOSIS — R06.00 DYSPNEA, UNSPECIFIED: ICD-10-CM

## 2021-01-17 DIAGNOSIS — N17.9 ACUTE KIDNEY FAILURE, UNSPECIFIED: ICD-10-CM

## 2021-01-17 DIAGNOSIS — Z90.5 ACQUIRED ABSENCE OF KIDNEY: Chronic | ICD-10-CM

## 2021-01-17 DIAGNOSIS — J18.9 PNEUMONIA, UNSPECIFIED ORGANISM: ICD-10-CM

## 2021-01-17 LAB
ALBUMIN SERPL ELPH-MCNC: 3.8 G/DL — SIGNIFICANT CHANGE UP (ref 3.3–5)
ALP SERPL-CCNC: 85 U/L — SIGNIFICANT CHANGE UP (ref 40–120)
ALT FLD-CCNC: 11 U/L — SIGNIFICANT CHANGE UP (ref 4–33)
ANION GAP SERPL CALC-SCNC: 19 MMOL/L — HIGH (ref 7–14)
APTT BLD: 47.9 SEC — HIGH (ref 27–36.3)
AST SERPL-CCNC: 24 U/L — SIGNIFICANT CHANGE UP (ref 4–32)
B PERT DNA SPEC QL NAA+PROBE: SIGNIFICANT CHANGE UP
BASOPHILS # BLD AUTO: 0.04 K/UL — SIGNIFICANT CHANGE UP (ref 0–0.2)
BASOPHILS NFR BLD AUTO: 0.2 % — SIGNIFICANT CHANGE UP (ref 0–2)
BILIRUB SERPL-MCNC: 0.6 MG/DL — SIGNIFICANT CHANGE UP (ref 0.2–1.2)
BLOOD GAS VENOUS COMPREHENSIVE RESULT: SIGNIFICANT CHANGE UP
BUN SERPL-MCNC: 58 MG/DL — HIGH (ref 7–23)
C PNEUM DNA SPEC QL NAA+PROBE: SIGNIFICANT CHANGE UP
CALCIUM SERPL-MCNC: 9.3 MG/DL — SIGNIFICANT CHANGE UP (ref 8.4–10.5)
CHLORIDE SERPL-SCNC: 94 MMOL/L — LOW (ref 98–107)
CO2 SERPL-SCNC: 24 MMOL/L — SIGNIFICANT CHANGE UP (ref 22–31)
CREAT SERPL-MCNC: 2.98 MG/DL — HIGH (ref 0.5–1.3)
EOSINOPHIL # BLD AUTO: 0.07 K/UL — SIGNIFICANT CHANGE UP (ref 0–0.5)
EOSINOPHIL NFR BLD AUTO: 0.4 % — SIGNIFICANT CHANGE UP (ref 0–6)
FLUAV H1 2009 PAND RNA SPEC QL NAA+PROBE: SIGNIFICANT CHANGE UP
FLUAV H1 RNA SPEC QL NAA+PROBE: SIGNIFICANT CHANGE UP
FLUAV H3 RNA SPEC QL NAA+PROBE: SIGNIFICANT CHANGE UP
FLUAV SUBTYP SPEC NAA+PROBE: SIGNIFICANT CHANGE UP
FLUBV RNA SPEC QL NAA+PROBE: SIGNIFICANT CHANGE UP
GLUCOSE SERPL-MCNC: 107 MG/DL — HIGH (ref 70–99)
HADV DNA SPEC QL NAA+PROBE: SIGNIFICANT CHANGE UP
HCOV PNL SPEC NAA+PROBE: SIGNIFICANT CHANGE UP
HCT VFR BLD CALC: 39.6 % — SIGNIFICANT CHANGE UP (ref 34.5–45)
HGB BLD-MCNC: 12.3 G/DL — SIGNIFICANT CHANGE UP (ref 11.5–15.5)
HMPV RNA SPEC QL NAA+PROBE: SIGNIFICANT CHANGE UP
HPIV1 RNA SPEC QL NAA+PROBE: SIGNIFICANT CHANGE UP
HPIV2 RNA SPEC QL NAA+PROBE: SIGNIFICANT CHANGE UP
HPIV3 RNA SPEC QL NAA+PROBE: SIGNIFICANT CHANGE UP
HPIV4 RNA SPEC QL NAA+PROBE: SIGNIFICANT CHANGE UP
IANC: 12.48 K/UL — HIGH (ref 1.5–8.5)
IMM GRANULOCYTES NFR BLD AUTO: 0.8 % — SIGNIFICANT CHANGE UP (ref 0–1.5)
INR BLD: 1.16 RATIO — SIGNIFICANT CHANGE UP (ref 0.88–1.16)
LYMPHOCYTES # BLD AUTO: 1.73 K/UL — SIGNIFICANT CHANGE UP (ref 1–3.3)
LYMPHOCYTES # BLD AUTO: 10.7 % — LOW (ref 13–44)
MCHC RBC-ENTMCNC: 27.8 PG — SIGNIFICANT CHANGE UP (ref 27–34)
MCHC RBC-ENTMCNC: 31.1 GM/DL — LOW (ref 32–36)
MCV RBC AUTO: 89.4 FL — SIGNIFICANT CHANGE UP (ref 80–100)
MONOCYTES # BLD AUTO: 1.65 K/UL — HIGH (ref 0–0.9)
MONOCYTES NFR BLD AUTO: 10.2 % — SIGNIFICANT CHANGE UP (ref 2–14)
NEUTROPHILS # BLD AUTO: 12.48 K/UL — HIGH (ref 1.8–7.4)
NEUTROPHILS NFR BLD AUTO: 77.7 % — HIGH (ref 43–77)
NRBC # BLD: 0 /100 WBCS — SIGNIFICANT CHANGE UP
NRBC # FLD: 0 K/UL — SIGNIFICANT CHANGE UP
PLATELET # BLD AUTO: 315 K/UL — SIGNIFICANT CHANGE UP (ref 150–400)
POTASSIUM SERPL-MCNC: 4.2 MMOL/L — SIGNIFICANT CHANGE UP (ref 3.5–5.3)
POTASSIUM SERPL-SCNC: 4.2 MMOL/L — SIGNIFICANT CHANGE UP (ref 3.5–5.3)
PROT SERPL-MCNC: 7.4 G/DL — SIGNIFICANT CHANGE UP (ref 6–8.3)
PROTHROM AB SERPL-ACNC: 13.2 SEC — SIGNIFICANT CHANGE UP (ref 10.6–13.6)
RAPID RVP RESULT: DETECTED
RBC # BLD: 4.43 M/UL — SIGNIFICANT CHANGE UP (ref 3.8–5.2)
RBC # FLD: 15.9 % — HIGH (ref 10.3–14.5)
RV+EV RNA SPEC QL NAA+PROBE: SIGNIFICANT CHANGE UP
SARS-COV-2 RNA SPEC QL NAA+PROBE: DETECTED
SODIUM SERPL-SCNC: 137 MMOL/L — SIGNIFICANT CHANGE UP (ref 135–145)
WBC # BLD: 16.1 K/UL — HIGH (ref 3.8–10.5)
WBC # FLD AUTO: 16.1 K/UL — HIGH (ref 3.8–10.5)

## 2021-01-17 PROCEDURE — 71045 X-RAY EXAM CHEST 1 VIEW: CPT | Mod: 26

## 2021-01-17 PROCEDURE — 99285 EMERGENCY DEPT VISIT HI MDM: CPT | Mod: CS

## 2021-01-17 RX ORDER — CEFEPIME 1 G/1
1000 INJECTION, POWDER, FOR SOLUTION INTRAMUSCULAR; INTRAVENOUS DAILY
Refills: 0 | Status: DISCONTINUED | OUTPATIENT
Start: 2021-01-18 | End: 2021-01-21

## 2021-01-17 RX ORDER — ASPIRIN/CALCIUM CARB/MAGNESIUM 324 MG
81 TABLET ORAL DAILY
Refills: 0 | Status: DISCONTINUED | OUTPATIENT
Start: 2021-01-17 | End: 2021-01-18

## 2021-01-17 RX ORDER — ALBUTEROL 90 UG/1
1 AEROSOL, METERED ORAL EVERY 4 HOURS
Refills: 0 | Status: DISCONTINUED | OUTPATIENT
Start: 2021-01-17 | End: 2021-01-18

## 2021-01-17 RX ORDER — BACLOFEN 100 %
10 POWDER (GRAM) MISCELLANEOUS EVERY 8 HOURS
Refills: 0 | Status: DISCONTINUED | OUTPATIENT
Start: 2021-01-17 | End: 2021-01-21

## 2021-01-17 RX ORDER — AMLODIPINE BESYLATE 2.5 MG/1
5 TABLET ORAL DAILY
Refills: 0 | Status: DISCONTINUED | OUTPATIENT
Start: 2021-01-17 | End: 2021-01-21

## 2021-01-17 RX ORDER — SODIUM CHLORIDE 9 MG/ML
1000 INJECTION INTRAMUSCULAR; INTRAVENOUS; SUBCUTANEOUS ONCE
Refills: 0 | Status: COMPLETED | OUTPATIENT
Start: 2021-01-17 | End: 2021-01-17

## 2021-01-17 RX ORDER — HEPARIN SODIUM 5000 [USP'U]/ML
7500 INJECTION INTRAVENOUS; SUBCUTANEOUS EVERY 12 HOURS
Refills: 0 | Status: DISCONTINUED | OUTPATIENT
Start: 2021-01-17 | End: 2021-01-18

## 2021-01-17 RX ORDER — CHOLECALCIFEROL (VITAMIN D3) 125 MCG
1000 CAPSULE ORAL DAILY
Refills: 0 | Status: DISCONTINUED | OUTPATIENT
Start: 2021-01-17 | End: 2021-01-21

## 2021-01-17 RX ORDER — VANCOMYCIN HCL 1 G
1000 VIAL (EA) INTRAVENOUS ONCE
Refills: 0 | Status: COMPLETED | OUTPATIENT
Start: 2021-01-17 | End: 2021-01-17

## 2021-01-17 RX ORDER — INFLUENZA VIRUS VACCINE 15; 15; 15; 15 UG/.5ML; UG/.5ML; UG/.5ML; UG/.5ML
0.5 SUSPENSION INTRAMUSCULAR ONCE
Refills: 0 | Status: DISCONTINUED | OUTPATIENT
Start: 2021-01-17 | End: 2021-01-21

## 2021-01-17 RX ORDER — TIOTROPIUM BROMIDE 18 UG/1
1 CAPSULE ORAL; RESPIRATORY (INHALATION) DAILY
Refills: 0 | Status: DISCONTINUED | OUTPATIENT
Start: 2021-01-17 | End: 2021-01-21

## 2021-01-17 RX ORDER — GABAPENTIN 400 MG/1
100 CAPSULE ORAL AT BEDTIME
Refills: 0 | Status: DISCONTINUED | OUTPATIENT
Start: 2021-01-17 | End: 2021-01-21

## 2021-01-17 RX ORDER — ACETAMINOPHEN 500 MG
650 TABLET ORAL EVERY 6 HOURS
Refills: 0 | Status: DISCONTINUED | OUTPATIENT
Start: 2021-01-17 | End: 2021-01-21

## 2021-01-17 RX ORDER — CEFEPIME 1 G/1
2000 INJECTION, POWDER, FOR SOLUTION INTRAMUSCULAR; INTRAVENOUS ONCE
Refills: 0 | Status: COMPLETED | OUTPATIENT
Start: 2021-01-17 | End: 2021-01-17

## 2021-01-17 RX ORDER — ENOXAPARIN SODIUM 100 MG/ML
30 INJECTION SUBCUTANEOUS DAILY
Refills: 0 | Status: DISCONTINUED | OUTPATIENT
Start: 2021-01-17 | End: 2021-01-17

## 2021-01-17 RX ORDER — OLANZAPINE 15 MG/1
10 TABLET, FILM COATED ORAL DAILY
Refills: 0 | Status: DISCONTINUED | OUTPATIENT
Start: 2021-01-17 | End: 2021-01-21

## 2021-01-17 RX ORDER — HYDRALAZINE HCL 50 MG
25 TABLET ORAL EVERY 8 HOURS
Refills: 0 | Status: DISCONTINUED | OUTPATIENT
Start: 2021-01-17 | End: 2021-01-21

## 2021-01-17 RX ORDER — MONTELUKAST 4 MG/1
10 TABLET, CHEWABLE ORAL AT BEDTIME
Refills: 0 | Status: DISCONTINUED | OUTPATIENT
Start: 2021-01-17 | End: 2021-01-21

## 2021-01-17 RX ORDER — AZITHROMYCIN 500 MG/1
500 TABLET, FILM COATED ORAL EVERY 24 HOURS
Refills: 0 | Status: DISCONTINUED | OUTPATIENT
Start: 2021-01-18 | End: 2021-01-21

## 2021-01-17 RX ORDER — LANOLIN ALCOHOL/MO/W.PET/CERES
3 CREAM (GRAM) TOPICAL AT BEDTIME
Refills: 0 | Status: DISCONTINUED | OUTPATIENT
Start: 2021-01-17 | End: 2021-01-21

## 2021-01-17 RX ORDER — AZITHROMYCIN 500 MG/1
500 TABLET, FILM COATED ORAL ONCE
Refills: 0 | Status: COMPLETED | OUTPATIENT
Start: 2021-01-17 | End: 2021-01-17

## 2021-01-17 RX ORDER — LIDOCAINE 4 G/100G
1 CREAM TOPICAL ONCE
Refills: 0 | Status: COMPLETED | OUTPATIENT
Start: 2021-01-17 | End: 2021-01-17

## 2021-01-17 RX ORDER — DEXAMETHASONE 0.5 MG/5ML
6 ELIXIR ORAL ONCE
Refills: 0 | Status: COMPLETED | OUTPATIENT
Start: 2021-01-17 | End: 2021-01-17

## 2021-01-17 RX ORDER — NYSTATIN CREAM 100000 [USP'U]/G
1 CREAM TOPICAL ONCE
Refills: 0 | Status: COMPLETED | OUTPATIENT
Start: 2021-01-17 | End: 2021-01-19

## 2021-01-17 RX ORDER — SODIUM CHLORIDE 9 MG/ML
1000 INJECTION INTRAMUSCULAR; INTRAVENOUS; SUBCUTANEOUS
Refills: 0 | Status: DISCONTINUED | OUTPATIENT
Start: 2021-01-17 | End: 2021-01-18

## 2021-01-17 RX ORDER — IPRATROPIUM/ALBUTEROL SULFATE 18-103MCG
3 AEROSOL WITH ADAPTER (GRAM) INHALATION ONCE
Refills: 0 | Status: COMPLETED | OUTPATIENT
Start: 2021-01-17 | End: 2021-01-17

## 2021-01-17 RX ORDER — SERTRALINE 25 MG/1
100 TABLET, FILM COATED ORAL DAILY
Refills: 0 | Status: DISCONTINUED | OUTPATIENT
Start: 2021-01-17 | End: 2021-01-21

## 2021-01-17 RX ORDER — ATORVASTATIN CALCIUM 80 MG/1
40 TABLET, FILM COATED ORAL AT BEDTIME
Refills: 0 | Status: DISCONTINUED | OUTPATIENT
Start: 2021-01-17 | End: 2021-01-21

## 2021-01-17 RX ORDER — LOPERAMIDE HCL 2 MG
2 TABLET ORAL EVERY 8 HOURS
Refills: 0 | Status: DISCONTINUED | OUTPATIENT
Start: 2021-01-17 | End: 2021-01-21

## 2021-01-17 RX ADMIN — Medication 6 MILLIGRAM(S): at 10:05

## 2021-01-17 RX ADMIN — Medication 3 MILLILITER(S): at 10:38

## 2021-01-17 RX ADMIN — SODIUM CHLORIDE 1000 MILLILITER(S): 9 INJECTION INTRAMUSCULAR; INTRAVENOUS; SUBCUTANEOUS at 10:38

## 2021-01-17 RX ADMIN — Medication 3 MILLIGRAM(S): at 22:41

## 2021-01-17 RX ADMIN — GABAPENTIN 100 MILLIGRAM(S): 400 CAPSULE ORAL at 22:41

## 2021-01-17 RX ADMIN — MONTELUKAST 10 MILLIGRAM(S): 4 TABLET, CHEWABLE ORAL at 22:41

## 2021-01-17 RX ADMIN — SODIUM CHLORIDE 70 MILLILITER(S): 9 INJECTION INTRAMUSCULAR; INTRAVENOUS; SUBCUTANEOUS at 22:41

## 2021-01-17 RX ADMIN — Medication 250 MILLIGRAM(S): at 10:56

## 2021-01-17 RX ADMIN — Medication 650 MILLIGRAM(S): at 23:36

## 2021-01-17 RX ADMIN — SODIUM CHLORIDE 1000 MILLILITER(S): 9 INJECTION INTRAMUSCULAR; INTRAVENOUS; SUBCUTANEOUS at 13:54

## 2021-01-17 RX ADMIN — Medication 5 MILLIGRAM(S): at 18:24

## 2021-01-17 RX ADMIN — HEPARIN SODIUM 7500 UNIT(S): 5000 INJECTION INTRAVENOUS; SUBCUTANEOUS at 18:24

## 2021-01-17 RX ADMIN — AZITHROMYCIN 255 MILLIGRAM(S): 500 TABLET, FILM COATED ORAL at 15:40

## 2021-01-17 RX ADMIN — ATORVASTATIN CALCIUM 40 MILLIGRAM(S): 80 TABLET, FILM COATED ORAL at 22:41

## 2021-01-17 RX ADMIN — CEFEPIME 100 MILLIGRAM(S): 1 INJECTION, POWDER, FOR SOLUTION INTRAMUSCULAR; INTRAVENOUS at 10:05

## 2021-01-17 RX ADMIN — Medication 25 MILLIGRAM(S): at 22:41

## 2021-01-17 RX ADMIN — SODIUM CHLORIDE 70 MILLILITER(S): 9 INJECTION INTRAMUSCULAR; INTRAVENOUS; SUBCUTANEOUS at 18:52

## 2021-01-17 RX ADMIN — LIDOCAINE 1 PATCH: 4 CREAM TOPICAL at 23:38

## 2021-01-17 RX ADMIN — ALBUTEROL 1 PUFF(S): 90 AEROSOL, METERED ORAL at 18:24

## 2021-01-17 NOTE — H&P ADULT - ATTENDING COMMENTS
Patient seen, examined, and interviewed by me, case discussed with me, chart reviewed, agree with above H/P as reviewed.  See  full note above.  Dr. DIANE Welch ([phone]) Dr. DIANE Welch ([phone])

## 2021-01-17 NOTE — H&P ADULT - PROBLEM SELECTOR PLAN 7
Has h/o COPD  c/w home meds  started on decardon Has h/o hypothyroid  c/w home dose levothyroxine  f/u tsh

## 2021-01-17 NOTE — ED ADULT TRIAGE NOTE - CHIEF COMPLAINT QUOTE
pt brought in by ems from home, tested positive for covid last week. here for shortness of breath and body aches. spo2 on nasal cannula in the field 86%, on O2 via nc @ 6lpm 93% in triage

## 2021-01-17 NOTE — H&P ADULT - ASSESSMENT
71 yo female with medical history significant for Anxiety, Asthma, Breast cancer in situ, COPD (chronic obstructive pulmonary disease). DVT (deep venous thrombosis) not presently on A/C, Graves disease, Hyperlipidemia, Hypertension, Hypothyroid, Parathyroidectomy, Kidney cancer, primary, with metastasis from kidney to other site,  LEft sided nephrectomy only- no chemo or RT, Obesity, Sleep apnea comes in  with dehydration, ALDAIR, PNA

## 2021-01-17 NOTE — ED ADULT NURSE NOTE - NSIMPLEMENTINTERV_GEN_ALL_ED
Implemented All Fall Risk Interventions:  [place] to call system. Call bell, personal items and telephone within reach. Instruct patient to call for assistance. Room bathroom lighting operational. Non-slip footwear when patient is off stretcher. Physically safe environment: no spills, clutter or unnecessary equipment. Stretcher in lowest position, wheels locked, appropriate side rails in place. Provide visual cue, wrist band, yellow gown, etc. Monitor gait and stability. Monitor for mental status changes and reorient to person, place, and time. Review medications for side effects contributing to fall risk. Reinforce activity limits and safety measures with patient and family.

## 2021-01-17 NOTE — ED ADULT NURSE NOTE - OBJECTIVE STATEMENT
delores RN: pt 71 yo female, hx of HTN, HLD, Hypothyroidism, COPD, recently discharged from [facility] with positive covid 19 diagnosis, sent home on 2L NC supplemental oxygen. pt called EMS for SOB and feeling of being "dehydrated". as per triage note, pt w O2 saturation of 86% at home w supplemental O2. on exam, pt anxious, tremulous, pt states tremor is her baseline. pt with bruising to periumbilical region. pt entire buttocks area with blanchable redness/excoriation. pt with blanchable redness under mau breast areas. PT bilateral LE's swollen, with dry, reddened, hardened skin.  pt denies chest pain, headache, abdominal pain, n/v and states she is able to tolerate PO intake at home. 20g iv established to L AC, labs sent as ordered. handoff report given to primary RN.

## 2021-01-17 NOTE — H&P ADULT - PROBLEM SELECTOR PLAN 3
Has h/o chronic edema of LE  Now has mild erythema and pain  mostly chronic stasis dermatitis   started on cefazolin >> likely short course ( HD) per pt not been eating  / drinking well> likely dehydration and pre-renal azothermia  pt post IVh in ED  gentle IVH and monitor  hold diuretics, ACEI / ARB    mgmt of Dh with imodium PRN ( at this time low suspicion of CDIFF)

## 2021-01-17 NOTE — H&P ADULT - PROBLEM SELECTOR PLAN 6
Has h/o hypothyroid  c/w home dose levothyroxine  f/u tsh Has h/o HTN  holding amlodipine due to lower extremity edema  c/w other meds with parameters  monitor BP HTN  continue home meds   monitor BP

## 2021-01-17 NOTE — ED PROVIDER NOTE - PROGRESS NOTE DETAILS
Resident Geetha: preliminary evaluation with demonstration of Tachycardia and tachypnea likely in the setting of COPD Exacerbation vs. COVID; however PE cannot be effectively rule out (but given pt's ALDAIR, cannot obtain CTA Chest here in ED - will need to f/u inpatient). ALDAIR likely in the setting of dehydration, s/p fluid hydration in ED here. Case endorsed to Hospitalist (DO Yuri) and need to further investigate for PE.

## 2021-01-17 NOTE — ED PROVIDER NOTE - OBJECTIVE STATEMENT
70 female, hx: HTN, HLD, COPD (not home O2), DVT's (not on A/C), hypothyroidism - presents with worsening SOB and SCHMID on exertion (found to be 86% on O2 at home in the field by EMS). Pt recently admitted to [facility], with Acute Hypoxemic Respiratory Failure in the setting of COVID, s/p Remdesevir and Dexamethasone. Denies any recent fevers, chills, chest pain, abdominal pain, nausea, vomiting, constipation, bloody stools. (+) Endorses dysuric symptoms.

## 2021-01-17 NOTE — ED PROVIDER NOTE - PHYSICAL EXAMINATION
GEN - Unkempt; A+Ox3, Walker Dependent gait   HENT - NC/AT, No visible Ecchymosis, No Abrasions, No Lac/Tears, MMM, no discharge  EYES - EOMI, no conjunctival pallor, no scleral icterus  PULM - (+) Tachypneic; Coarse B/L,  symmetric breath sounds  CV -  RRR, S1 S2, no murmurs  GI - NT/ND, soft, no guarding, no rebound, no masses    MSK/EXT- (+) B/L LE non-pitting 2+ edema, no gross deformity, warm and well perfused  NEUROLOGIC - alert, moving all 4 ext with 5/5 Strength

## 2021-01-17 NOTE — H&P ADULT - PROBLEM SELECTOR PLAN 4
Has h/o Graves disease, has exophthalmus  Also has scar on neck which she says has parathyroidectomy  Was on prednisone 5mg Same as above  monitor inflammatory markers

## 2021-01-17 NOTE — H&P ADULT - NSHPREVIEWOFSYSTEMS_GEN_ALL_CORE
GEN: no fever, no chills, no pain  RESP: no signif SOBreproted, occasional cough, no sputum  CVS: no chest pain, no palpitations, chronic bilateral LE edema  GI: no abdominal pain, no nausea, no vomiting, as above   : no dysuria, no frequency, no hematuria  NEURO: no headache, no dizziness  PSYCH: no depression, not anxious  Derm : no itching, no rash

## 2021-01-17 NOTE — H&P ADULT - NSHPPHYSICALEXAM_GEN_ALL_CORE
Height (cm): 162.6  Weight (kg): 100.7  BMI (kg/m2): 38.1  Vital Signs Last 24 HrsT(C): 36.8 (01-17-21 @ 15:15)  T(F): 98.3 (01-17-21 @ 15:15), Max: 98.3 (01-17-21 @ 09:22)  HR: 73 (01-17-21 @ 15:15) (73 - 110)  BP: 140/55 (01-17-21 @ 15:15)  RR: 18 (01-17-21 @ 15:15) (17 - 22)  SpO2: 97% (01-17-21 @ 15:15) (93% - 97%)      GEN: A&O X 3 , NAD , comfortable  HEENT: NCAT, PERRL, MMM, hearing intact  Neck: supple , no JVD appreciated   CVS: S1S2 , regular , No M/R/G appreciated  PULM: CTA B/L,  limited exam as not taking deep breaths   ABD.: soft. non tender, non distended,  bowel sounds present  Extrem: intact pulses , mild b/l edema   Derm: No rash , no ecchymoses  PSYCH : normal mood,  no delusion not anxious

## 2021-01-17 NOTE — H&P ADULT - HISTORY OF PRESENT ILLNESS
Pt is a 69 yo woman with medical history significant for Anxiety, Asthma, Breast cancer in situ, COPD (chronic obstructive pulmonary disease). past DVT not presently on A/C, Graves disease, Hyperlipidemia, Hypertension, Hypothyroid, Parathyroidectomy, Kidney cancer, primary, with metastasis from kidney to other site,  LEft sided nephrectomy only- no chemo or RT, Obesity, Sleep apnea, recently admitted to [place] for COVID and cellulitis, finished Remdesivir and Decadron, discharged home on 2-3 L O2, now comes in with decreased Po intak enad dehydration  pt in ED found to have leukocytosis, ALDAIR, remains hypoxemic  pt treated for pneumonia, dehydration and ALDAIR  pt denies fever, chills, significant cough or sputum  pt states has been having diarrhea, which she chronically has had and takes imodium for at home

## 2021-01-17 NOTE — ED PROVIDER NOTE - ATTENDING CONTRIBUTION TO CARE
DR. FUENTES, ATTENDING MD-  I performed a face to face bedside interview with the patient regarding history of present illness, review of symptoms and past medical history. I completed an independent physical exam.  I have discussed the patient's plan of care with the resident.   Documentation as above in the note.    71 y/o female h/o copd, htn, hld, dvt here with sob, wheezing.  On home o2 after recent admission for covid pna.  Eval for secondary pna, anemia, lyte abn, copd exac, pe, dehydration.  Obtain cbc cmp rvp with covid cxr ctpa, ekg give steroid abx ivf bronchodilators, will need admission for further care and evaluation.

## 2021-01-17 NOTE — H&P ADULT - PROBLEM SELECTOR PLAN 2
Same as above COVID still positive  CXR as above   treat underlying causes as above   Monitor respiratory status and supplement o2  pulm as above

## 2021-01-17 NOTE — ED PROVIDER NOTE - CLINICAL SUMMARY MEDICAL DECISION MAKING FREE TEXT BOX
70 female, hx: HTN, HLD, COPD (not home O2), DVT's (not on A/C), hypothyroidism - presents with worsening SOB and SCHMID on exertion (found to be 86% on O2 at home in the field by EMS). Pt recently admitted to [facility], with Acute Hypoxemic Respiratory Failure in the setting of COVID, s/p Remdesevir and Dexamethasone. Exam, presentation, and history concerning for likely COPD Exacerbation vs. PE vs. COVID 19 related respiratory distress. Minimal concern for PE, ACS, Dissection. Plan: CBC, CMP, EKG, CT Angio Chest, Duonebs, Dex, Abx for COPD Exacerbation. TBA. Tachypneic, and Hypoxemic with new oxygen requirement. 70 female, hx: HTN, HLD, COPD (not home O2), DVT's (not on A/C), hypothyroidism - presents with worsening SOB and SCHMID on exertion (found to be 86% on O2 at home in the field by EMS). Pt recently admitted to [facility], with Acute Hypoxemic Respiratory Failure in the setting of COVID, s/p Remdesevir and Dexamethasone. Exam, presentation, and history concerning for likely COPD Exacerbation vs. PE vs. COVID 19 related respiratory distress. Minimal concern for PTX, ACS, Dissection. Plan: CBC, CMP, EKG, CT Angio Chest, Duonebs, Dex, Abx for COPD Exacerbation. TBA. Tachypneic, and Hypoxemic with new oxygen requirement.

## 2021-01-17 NOTE — H&P ADULT - PROBLEM SELECTOR PLAN 5
Has h/o HTN  holding amlodipine due to lower extremity edema  c/w other meds with parameters  monitor BP Has h/o Graves disease, has exophthalmus  Also has scar on neck which she says has parathyroidectomy  Was on prednisone 5mg > will continue

## 2021-01-17 NOTE — H&P ADULT - PROBLEM SELECTOR PLAN 1
Presented with dyspnea, was found to be hypoxic  COVID positive  CXR shows mild infiltrates and mild effusion  Saturating 90 on RA, improved to 95 on 3L  Will start on decadron and remdesivir  f/u markers  Monitor respiratory status and supplement o2 pt with recent admission, leukocytosis, bilateral lower lobe infiltrates  less likely Viral ( COVID) PNA> possible bacterial superimposed infection   pt started on abx in ED >> continue for now  continue O2 and wean as able   monitor vitals, labs   pulm consult in AM :will call

## 2021-01-17 NOTE — ED PROVIDER NOTE - NS ED ROS FT
Constitution: No Fever or chills  HEENT: (+) nonproductive cough, No Discharge, No Rhinorrhea, No URI symptoms  Cardio: No Chest pain, No Palpitations, (+) Dyspnea  Resp: (+) SOB, No Wheezing  GI: No abdominal pain, No Nausea, No Vomiting, No Constipation, No Diarrhea  : (+) burning upon urination, trouble urinating, no foul odor from urine  MSK: No Back pain, No Numbness, No Tingling, No Weakness  Neuro: No Headache, Normal Gait  Skin: No rashes, No Bruising, No Swelling

## 2021-01-18 LAB
ALBUMIN SERPL ELPH-MCNC: 2.9 G/DL — LOW (ref 3.3–5)
ALP SERPL-CCNC: 73 U/L — SIGNIFICANT CHANGE UP (ref 40–120)
ALT FLD-CCNC: 10 U/L — SIGNIFICANT CHANGE UP (ref 4–33)
ANION GAP SERPL CALC-SCNC: 11 MMOL/L — SIGNIFICANT CHANGE UP (ref 7–14)
AST SERPL-CCNC: 23 U/L — SIGNIFICANT CHANGE UP (ref 4–32)
BASOPHILS # BLD AUTO: 0.01 K/UL — SIGNIFICANT CHANGE UP (ref 0–0.2)
BASOPHILS # BLD AUTO: 0.02 K/UL — SIGNIFICANT CHANGE UP (ref 0–0.2)
BASOPHILS NFR BLD AUTO: 0.1 % — SIGNIFICANT CHANGE UP (ref 0–2)
BASOPHILS NFR BLD AUTO: 0.2 % — SIGNIFICANT CHANGE UP (ref 0–2)
BILIRUB SERPL-MCNC: 0.5 MG/DL — SIGNIFICANT CHANGE UP (ref 0.2–1.2)
BLD GP AB SCN SERPL QL: NEGATIVE — SIGNIFICANT CHANGE UP
BUN SERPL-MCNC: 33 MG/DL — HIGH (ref 7–23)
CALCIUM SERPL-MCNC: 8.7 MG/DL — SIGNIFICANT CHANGE UP (ref 8.4–10.5)
CHLORIDE SERPL-SCNC: 102 MMOL/L — SIGNIFICANT CHANGE UP (ref 98–107)
CHOLEST SERPL-MCNC: 142 MG/DL — SIGNIFICANT CHANGE UP
CO2 SERPL-SCNC: 23 MMOL/L — SIGNIFICANT CHANGE UP (ref 22–31)
CREAT SERPL-MCNC: 0.84 MG/DL — SIGNIFICANT CHANGE UP (ref 0.5–1.3)
CRP SERPL-MCNC: 49 MG/L — HIGH
D DIMER BLD IA.RAPID-MCNC: 513 NG/ML DDU — HIGH
EOSINOPHIL # BLD AUTO: 0.02 K/UL — SIGNIFICANT CHANGE UP (ref 0–0.5)
EOSINOPHIL # BLD AUTO: 0.03 K/UL — SIGNIFICANT CHANGE UP (ref 0–0.5)
EOSINOPHIL NFR BLD AUTO: 0.2 % — SIGNIFICANT CHANGE UP (ref 0–6)
EOSINOPHIL NFR BLD AUTO: 0.2 % — SIGNIFICANT CHANGE UP (ref 0–6)
FERRITIN SERPL-MCNC: 164 NG/ML — HIGH (ref 15–150)
GLUCOSE SERPL-MCNC: 96 MG/DL — SIGNIFICANT CHANGE UP (ref 70–99)
HCT VFR BLD CALC: 34.1 % — LOW (ref 34.5–45)
HCT VFR BLD CALC: 34.3 % — LOW (ref 34.5–45)
HDLC SERPL-MCNC: 39 MG/DL — LOW
HGB BLD-MCNC: 10.7 G/DL — LOW (ref 11.5–15.5)
HGB BLD-MCNC: 10.8 G/DL — LOW (ref 11.5–15.5)
IANC: 11.46 K/UL — HIGH (ref 1.5–8.5)
IANC: 9.59 K/UL — HIGH (ref 1.5–8.5)
IMM GRANULOCYTES NFR BLD AUTO: 0.7 % — SIGNIFICANT CHANGE UP (ref 0–1.5)
IMM GRANULOCYTES NFR BLD AUTO: 0.8 % — SIGNIFICANT CHANGE UP (ref 0–1.5)
LACTATE SERPL-SCNC: 0.8 MMOL/L — SIGNIFICANT CHANGE UP (ref 0.5–2)
LDH SERPL L TO P-CCNC: 289 U/L — HIGH (ref 135–225)
LIPID PNL WITH DIRECT LDL SERPL: 74 MG/DL — SIGNIFICANT CHANGE UP
LYMPHOCYTES # BLD AUTO: 0.83 K/UL — LOW (ref 1–3.3)
LYMPHOCYTES # BLD AUTO: 1.46 K/UL — SIGNIFICANT CHANGE UP (ref 1–3.3)
LYMPHOCYTES # BLD AUTO: 10 % — LOW (ref 13–44)
LYMPHOCYTES # BLD AUTO: 7.1 % — LOW (ref 13–44)
MAGNESIUM SERPL-MCNC: 1.5 MG/DL — LOW (ref 1.6–2.6)
MCHC RBC-ENTMCNC: 27.4 PG — SIGNIFICANT CHANGE UP (ref 27–34)
MCHC RBC-ENTMCNC: 27.8 PG — SIGNIFICANT CHANGE UP (ref 27–34)
MCHC RBC-ENTMCNC: 31.2 GM/DL — LOW (ref 32–36)
MCHC RBC-ENTMCNC: 31.7 GM/DL — LOW (ref 32–36)
MCV RBC AUTO: 87.9 FL — SIGNIFICANT CHANGE UP (ref 80–100)
MCV RBC AUTO: 87.9 FL — SIGNIFICANT CHANGE UP (ref 80–100)
MONOCYTES # BLD AUTO: 1.22 K/UL — HIGH (ref 0–0.9)
MONOCYTES # BLD AUTO: 1.46 K/UL — HIGH (ref 0–0.9)
MONOCYTES NFR BLD AUTO: 10 % — SIGNIFICANT CHANGE UP (ref 2–14)
MONOCYTES NFR BLD AUTO: 10.4 % — SIGNIFICANT CHANGE UP (ref 2–14)
NEUTROPHILS # BLD AUTO: 11.46 K/UL — HIGH (ref 1.8–7.4)
NEUTROPHILS # BLD AUTO: 9.59 K/UL — HIGH (ref 1.8–7.4)
NEUTROPHILS NFR BLD AUTO: 78.9 % — HIGH (ref 43–77)
NEUTROPHILS NFR BLD AUTO: 81.4 % — HIGH (ref 43–77)
NON HDL CHOLESTEROL: 103 MG/DL — SIGNIFICANT CHANGE UP
NRBC # BLD: 0 /100 WBCS — SIGNIFICANT CHANGE UP
NRBC # BLD: 0 /100 WBCS — SIGNIFICANT CHANGE UP
NRBC # FLD: 0 K/UL — SIGNIFICANT CHANGE UP
NRBC # FLD: 0 K/UL — SIGNIFICANT CHANGE UP
PLATELET # BLD AUTO: 204 K/UL — SIGNIFICANT CHANGE UP (ref 150–400)
PLATELET # BLD AUTO: 232 K/UL — SIGNIFICANT CHANGE UP (ref 150–400)
POTASSIUM SERPL-MCNC: 3.5 MMOL/L — SIGNIFICANT CHANGE UP (ref 3.5–5.3)
POTASSIUM SERPL-SCNC: 3.5 MMOL/L — SIGNIFICANT CHANGE UP (ref 3.5–5.3)
PROCALCITONIN SERPL-MCNC: 0.1 NG/ML — SIGNIFICANT CHANGE UP (ref 0.02–0.1)
PROT SERPL-MCNC: 6.2 G/DL — SIGNIFICANT CHANGE UP (ref 6–8.3)
RBC # BLD: 3.88 M/UL — SIGNIFICANT CHANGE UP (ref 3.8–5.2)
RBC # BLD: 3.9 M/UL — SIGNIFICANT CHANGE UP (ref 3.8–5.2)
RBC # FLD: 15.3 % — HIGH (ref 10.3–14.5)
RBC # FLD: 15.5 % — HIGH (ref 10.3–14.5)
RH IG SCN BLD-IMP: NEGATIVE — SIGNIFICANT CHANGE UP
SODIUM SERPL-SCNC: 136 MMOL/L — SIGNIFICANT CHANGE UP (ref 135–145)
TRIGL SERPL-MCNC: 144 MG/DL — SIGNIFICANT CHANGE UP
WBC # BLD: 11.76 K/UL — HIGH (ref 3.8–10.5)
WBC # BLD: 14.54 K/UL — HIGH (ref 3.8–10.5)
WBC # FLD AUTO: 11.76 K/UL — HIGH (ref 3.8–10.5)
WBC # FLD AUTO: 14.54 K/UL — HIGH (ref 3.8–10.5)

## 2021-01-18 PROCEDURE — 99223 1ST HOSP IP/OBS HIGH 75: CPT | Mod: CS

## 2021-01-18 RX ORDER — HEPARIN SODIUM 5000 [USP'U]/ML
7500 INJECTION INTRAVENOUS; SUBCUTANEOUS EVERY 12 HOURS
Refills: 0 | Status: DISCONTINUED | OUTPATIENT
Start: 2021-01-18 | End: 2021-01-18

## 2021-01-18 RX ORDER — BUDESONIDE AND FORMOTEROL FUMARATE DIHYDRATE 160; 4.5 UG/1; UG/1
2 AEROSOL RESPIRATORY (INHALATION)
Refills: 0 | Status: DISCONTINUED | OUTPATIENT
Start: 2021-01-18 | End: 2021-01-21

## 2021-01-18 RX ORDER — ASPIRIN/CALCIUM CARB/MAGNESIUM 324 MG
81 TABLET ORAL DAILY
Refills: 0 | Status: DISCONTINUED | OUTPATIENT
Start: 2021-01-19 | End: 2021-01-21

## 2021-01-18 RX ORDER — ALBUTEROL 90 UG/1
2 AEROSOL, METERED ORAL EVERY 6 HOURS
Refills: 0 | Status: DISCONTINUED | OUTPATIENT
Start: 2021-01-18 | End: 2021-01-21

## 2021-01-18 RX ORDER — MAGNESIUM OXIDE 400 MG ORAL TABLET 241.3 MG
400 TABLET ORAL
Refills: 0 | Status: DISCONTINUED | OUTPATIENT
Start: 2021-01-18 | End: 2021-01-21

## 2021-01-18 RX ORDER — LANOLIN ALCOHOL/MO/W.PET/CERES
3 CREAM (GRAM) TOPICAL ONCE
Refills: 0 | Status: COMPLETED | OUTPATIENT
Start: 2021-01-18 | End: 2021-01-18

## 2021-01-18 RX ORDER — HEPARIN SODIUM 5000 [USP'U]/ML
7500 INJECTION INTRAVENOUS; SUBCUTANEOUS EVERY 8 HOURS
Refills: 0 | Status: DISCONTINUED | OUTPATIENT
Start: 2021-01-18 | End: 2021-01-21

## 2021-01-18 RX ADMIN — Medication 1000 UNIT(S): at 13:39

## 2021-01-18 RX ADMIN — Medication 5 MILLIGRAM(S): at 18:58

## 2021-01-18 RX ADMIN — Medication 5 MILLIGRAM(S): at 05:17

## 2021-01-18 RX ADMIN — OLANZAPINE 10 MILLIGRAM(S): 15 TABLET, FILM COATED ORAL at 13:39

## 2021-01-18 RX ADMIN — ALBUTEROL 1 PUFF(S): 90 AEROSOL, METERED ORAL at 01:07

## 2021-01-18 RX ADMIN — Medication 25 MILLIGRAM(S): at 05:17

## 2021-01-18 RX ADMIN — MAGNESIUM OXIDE 400 MG ORAL TABLET 400 MILLIGRAM(S): 241.3 TABLET ORAL at 15:20

## 2021-01-18 RX ADMIN — LIDOCAINE 1 PATCH: 4 CREAM TOPICAL at 08:30

## 2021-01-18 RX ADMIN — Medication 25 MILLIGRAM(S): at 13:40

## 2021-01-18 RX ADMIN — SODIUM CHLORIDE 70 MILLILITER(S): 9 INJECTION INTRAMUSCULAR; INTRAVENOUS; SUBCUTANEOUS at 05:16

## 2021-01-18 RX ADMIN — ATORVASTATIN CALCIUM 40 MILLIGRAM(S): 80 TABLET, FILM COATED ORAL at 22:27

## 2021-01-18 RX ADMIN — HEPARIN SODIUM 7500 UNIT(S): 5000 INJECTION INTRAVENOUS; SUBCUTANEOUS at 05:17

## 2021-01-18 RX ADMIN — ALBUTEROL 1 PUFF(S): 90 AEROSOL, METERED ORAL at 13:42

## 2021-01-18 RX ADMIN — AZITHROMYCIN 255 MILLIGRAM(S): 500 TABLET, FILM COATED ORAL at 13:41

## 2021-01-18 RX ADMIN — ALBUTEROL 1 PUFF(S): 90 AEROSOL, METERED ORAL at 18:11

## 2021-01-18 RX ADMIN — LIDOCAINE 1 PATCH: 4 CREAM TOPICAL at 13:13

## 2021-01-18 RX ADMIN — HEPARIN SODIUM 7500 UNIT(S): 5000 INJECTION INTRAVENOUS; SUBCUTANEOUS at 22:25

## 2021-01-18 RX ADMIN — Medication 3 MILLIGRAM(S): at 22:27

## 2021-01-18 RX ADMIN — AMLODIPINE BESYLATE 5 MILLIGRAM(S): 2.5 TABLET ORAL at 05:17

## 2021-01-18 RX ADMIN — Medication 3 MILLIGRAM(S): at 01:07

## 2021-01-18 RX ADMIN — CEFEPIME 100 MILLIGRAM(S): 1 INJECTION, POWDER, FOR SOLUTION INTRAMUSCULAR; INTRAVENOUS at 13:41

## 2021-01-18 RX ADMIN — TIOTROPIUM BROMIDE 1 CAPSULE(S): 18 CAPSULE ORAL; RESPIRATORY (INHALATION) at 13:42

## 2021-01-18 RX ADMIN — ALBUTEROL 1 PUFF(S): 90 AEROSOL, METERED ORAL at 05:16

## 2021-01-18 RX ADMIN — GABAPENTIN 100 MILLIGRAM(S): 400 CAPSULE ORAL at 22:27

## 2021-01-18 RX ADMIN — MAGNESIUM OXIDE 400 MG ORAL TABLET 400 MILLIGRAM(S): 241.3 TABLET ORAL at 13:40

## 2021-01-18 RX ADMIN — MONTELUKAST 10 MILLIGRAM(S): 4 TABLET, CHEWABLE ORAL at 22:27

## 2021-01-18 RX ADMIN — SERTRALINE 100 MILLIGRAM(S): 25 TABLET, FILM COATED ORAL at 13:41

## 2021-01-18 RX ADMIN — ALBUTEROL 1 PUFF(S): 90 AEROSOL, METERED ORAL at 00:43

## 2021-01-18 RX ADMIN — Medication 25 MILLIGRAM(S): at 22:27

## 2021-01-18 RX ADMIN — Medication 1 TABLET(S): at 13:39

## 2021-01-18 RX ADMIN — Medication 81 MILLIGRAM(S): at 13:39

## 2021-01-18 NOTE — PROGRESS NOTE ADULT - ASSESSMENT
_________________________________________________________________________________________  ========>>  M E D I C A L   A T T E N D I N G    F O L L O W  U P  N O T E  <<=========  -----------------------------------------------------------------------------------------------------    - Patient seen and examined by me earlier today.   - In summary,  SHEKHAR VASQUEZ is a 70y year old woman admitted with SOB  - Patient today overall doing ok, comfortable, eating fairly,      ( there is reportedly concern for possible blood in stool  yesterday ( pt not giving clear history > given slight drop in Hgl ( likely dilutional) , holding AC and ASA until repeat CBC is stable )     ==================>> REVIEW OF SYSTEM <<=================    GEN: no fever, no chills, no pain  RESP: mild SOB, no cough, no sputum  CVS: no chest pain, no palpitations, no edema  GI: no abdominal pain, no nausea, no constipation, no diarrhea  : no dysuria, no frequency, no hematuria  Neuro: no headache, no dizziness  Derm : no itching, no rash    ==================>> PHYSICAL EXAM <<=================    GEN: A&O X 3 , NAD , comfortable  HEENT: NCAT, PERRL, MMM, hearing intact  Neck: supple , no JVD appreciated  CVS: S1S2 , regular , No M/R/G appreciated  PULM: CTA B/L,  no W/R/R appreciated  ABD.: soft. non tender, non distended,  bowel sounds present  Extrem: intact pulses , no edema   PSYCH : normal mood,  not anxious      ==================>> MEDICATIONS <<====================    MEDICATIONS  (STANDING):  ALBUTerol    90 MICROgram(s) HFA Inhaler 1 Puff(s) Inhalation every 4 hours  amLODIPine   Tablet 5 milliGRAM(s) Oral daily  atorvastatin 40 milliGRAM(s) Oral at bedtime  azithromycin  IVPB 500 milliGRAM(s) IV Intermittent every 24 hours  cefepime   IVPB 1000 milliGRAM(s) IV Intermittent daily  cholecalciferol 1000 Unit(s) Oral daily  gabapentin 100 milliGRAM(s) Oral at bedtime  hydrALAZINE 25 milliGRAM(s) Oral every 8 hours  influenza   Vaccine 0.5 milliLiter(s) IntraMuscular once  magnesium oxide 400 milliGRAM(s) Oral three times a day with meals  melatonin 3 milliGRAM(s) Oral at bedtime  montelukast 10 milliGRAM(s) Oral at bedtime  multivitamin 1 Tablet(s) Oral daily  nystatin Powder 1 Application(s) Topical once  OLANZapine 10 milliGRAM(s) Oral daily  predniSONE   Tablet 5 milliGRAM(s) Oral two times a day  sertraline 100 milliGRAM(s) Oral daily  tiotropium 18 MICROgram(s) Capsule 1 Capsule(s) Inhalation daily    MEDICATIONS  (PRN):  acetaminophen   Tablet .. 650 milliGRAM(s) Oral every 6 hours PRN Temp greater or equal to 38C (100.4F), Mild Pain (1 - 3), Moderate Pain (4 - 6)  baclofen 10 milliGRAM(s) Oral every 8 hours PRN back pain and spasm  loperamide 2 milliGRAM(s) Oral every 8 hours PRN Diarrhea    ___________  Active diet:  Diet, Regular:   DASH/TLC Sodium & Cholesterol Restricted (DASH)  ___________________    ==================>> VITAL SIGNS <<==================    T(C): 36.5 (01-18-21 @ 13:29), Max: 36.8 (01-18-21 @ 05:25)  HR: 83 (01-18-21 @ 13:29) (82 - 86)  BP: 127/78 (01-18-21 @ 13:29) (127/78 - 151/54)  BP(mean): --  RR: 16 (01-18-21 @ 13:29) (16 - 16)  SpO2: 97% (01-18-21 @ 13:29) (95% - 97%)   CAPILLARY BLOOD GLUCOSE      I&O's Summary    17 Jan 2021 07:01  -  18 Jan 2021 07:00  --------------------------------------------------------  IN: 0 mL / OUT: 1800 mL / NET: -1800 mL         ==================>> LAB AND IMAGING <<==================                        10.7   14.54 )-----------( 232      ( 18 Jan 2021 07:36 )             34.3        01-18    136  |  102  |  33<H>  ----------------------------<  96  3.5   |  23  |  0.84    Ca    8.7      18 Jan 2021 07:36  Mg     1.5     01-18    TPro  6.2  /  Alb  2.9<L>  /  TBili  0.5  /  DBili  x   /  AST  23  /  ALT  10  /  AlkPhos  73  01-18    PT/INR - ( 17 Jan 2021 10:16 )   PT: 13.2 sec;   INR: 1.16 ratio         PTT - ( 17 Jan 2021 10:16 )  PTT:47.9 sec                 TSH:      0.78   (01-06-21)           Lipid profile:  (01-18-21)     Total: 142     LDL  : (p)     HDL  :39     TG   :144     HgA1C:   (01-06-21)          (01-06-21)      5.4    ___________________________________________________________________________________  ===============>>  A S S E S S M E N T   A N D   P L A N <<===============  ------------------------------------------------------------------------------------------      Pt is a 71 yo woman with medical history significant for Anxiety, Asthma, Breast cancer in situ, COPD (chronic obstructive pulmonary disease). past DVT not presently on A/C, Graves disease, Hyperlipidemia, Hypertension, Hypothyroid, Parathyroidectomy, Kidney cancer, primary, with metastasis from kidney to other site,  LEft sided nephrectomy only- no chemo or RT, Obesity, Sleep apnea, recently admitted to [place] for COVID and cellulitis, finished Remdesivir and Decadron, discharged home on 2-3 L O2, now comes in with decreased Po intak enad dehydration  pt in ED found to have leukocytosis, ALDAIR, remains hypoxemic  pt treated for pneumonia, dehydration and ALDAIR  pt denies fever, chills, significant cough or sputum  pt states has been having diarrhea, which she chronically has had and takes imodium for at home     · Assessment      71 yo female with medical history significant for Anxiety, Asthma, Breast cancer in situ, COPD (chronic obstructive pulmonary disease). DVT (deep venous thrombosis) not presently on A/C, Graves disease, Hyperlipidemia, Hypertension, Hypothyroid, Parathyroidectomy, Kidney cancer, primary, with metastasis from kidney to other site,  LEft sided nephrectomy only- no chemo or RT, Obesity, Sleep apnea comes in  with dehydration, ALDAIR, PNA    Problem/Plan - 1:  ·  Problem: HCAP (healthcare-associated pneumonia). Plan: pt with recent admission, leukocytosis, bilateral lower lobe infiltrates  less likely Viral ( COVID) PNA> possible bacterial superimposed infection   pt started on abx in ED >> continue for now  continue O2 and wean as able   monitor vitals, labs   pulm consult in AM :will call.    Problem/Plan - 2:  ·  Problem: Acute hypoxemic respiratory failure due to COVID-19. Plan: COVID still positive  CXR as above   treat underlying causes as above   Monitor respiratory status and supplement o2  pulm as above.    Problem/Plan - 3:  ·  Problem: ALDAIR (acute kidney injury). Plan: per pt not been eating  / drinking well> likely dehydration and pre-renal azothermia  pt post IVh in ED  gentle IVH and monitor  hold diuretics, ACEI / ARB    mgmt of Dh with imodium PRN ( at this time low suspicion of CDIFF).    Problem/Plan - 4:  ·  Problem: COVID-19. Plan: Same as above  monitor inflammatory markers.    Problem/Plan - 5:  ·  Problem: Graves disease. Plan: Has h/o Graves disease, has exophthalmus  Also has scar on neck which she says has parathyroidectomy  Was on prednisone 5mg > will continue.    Problem/Plan - 6:  Problem: Hypertension.Plan: Has h/o HTN  holding amlodipine due to lower extremity edema  c/w other meds with parameters  monitor BP.    Problem/Plan - 7:  ·  Problem: Hypothyroid. Plan: Has h/o hypothyroid  c/w home dose levothyroxine  f/u tsh.    Problem/Plan - 8:  ·  Problem: COPD (chronic obstructive pulmonary disease). Plan: Has h/o COPD  c/w home meds  pulm f/u.    Problem/Plan - 9:  ·  Problem: Prophylactic measure. Plan: on lovenox for dvt prophylaxis.    Problem/Plan - 10:  Problem: Goals of care, counseling/discussion. Plan; FULL code as per prio discussion with patient.      --------------------------------------------  Case discussed with   Education given on findings and plan of care  ___________________________  H. JANICE Welch.  Pager: [phone]       _________________________________________________________________________________________  ========>>  M E D I C A L   A T T E N D I N G    F O L L O W  U P  N O T E  <<=========  -----------------------------------------------------------------------------------------------------    - Patient seen and examined by me earlier today.   - In summary,  SHEKHAR VASQUEZ is a 70y year old woman admitted with SOB  - Patient today overall doing ok, comfortable, eating fairly,      ( there is reportedly concern for possible blood in stool  yesterday ( pt not giving clear history > given slight drop in Hgl ( likely dilutional) , holding AC and ASA until repeat CBC is stable )     ==================>> REVIEW OF SYSTEM <<=================    GEN: no fever, no chills, no pain  RESP: mild SOB, + occasional cough, no sputum  CVS: no chest pain, no palpitations, no edema  GI: no abdominal pain, no nausea, no constipation, no diarrhea  : no dysuria, no frequency, no hematuria  Neuro: no headache, no dizziness  Derm : no itching, no rash    ==================>> PHYSICAL EXAM <<=================    GEN: A&O X 3 , NAD , comfortable  HEENT: NCAT, PERRL, MMM, hearing intact  Neck: supple , no JVD appreciated  CVS: S1S2 , regular , No M/R/G appreciated  PULM: CTA B/L,  no W/R/R appreciated  ABD.: soft. non tender, non distended,  obese   Extrem: intact pulses , no edema   PSYCH : normal mood,  a bit anxious      ==================>> MEDICATIONS <<====================    MEDICATIONS  (STANDING):  ALBUTerol    90 MICROgram(s) HFA Inhaler 1 Puff(s) Inhalation every 4 hours  amLODIPine   Tablet 5 milliGRAM(s) Oral daily  atorvastatin 40 milliGRAM(s) Oral at bedtime  azithromycin  IVPB 500 milliGRAM(s) IV Intermittent every 24 hours  cefepime   IVPB 1000 milliGRAM(s) IV Intermittent daily  cholecalciferol 1000 Unit(s) Oral daily  gabapentin 100 milliGRAM(s) Oral at bedtime  hydrALAZINE 25 milliGRAM(s) Oral every 8 hours  influenza   Vaccine 0.5 milliLiter(s) IntraMuscular once  magnesium oxide 400 milliGRAM(s) Oral three times a day with meals  melatonin 3 milliGRAM(s) Oral at bedtime  montelukast 10 milliGRAM(s) Oral at bedtime  multivitamin 1 Tablet(s) Oral daily  nystatin Powder 1 Application(s) Topical once  OLANZapine 10 milliGRAM(s) Oral daily  predniSONE   Tablet 5 milliGRAM(s) Oral two times a day  sertraline 100 milliGRAM(s) Oral daily  tiotropium 18 MICROgram(s) Capsule 1 Capsule(s) Inhalation daily    MEDICATIONS  (PRN):  acetaminophen   Tablet .. 650 milliGRAM(s) Oral every 6 hours PRN Temp greater or equal to 38C (100.4F), Mild Pain (1 - 3), Moderate Pain (4 - 6)  baclofen 10 milliGRAM(s) Oral every 8 hours PRN back pain and spasm  loperamide 2 milliGRAM(s) Oral every 8 hours PRN Diarrhea    ___________  Active diet:  Diet, Regular:   DASH/TLC Sodium & Cholesterol Restricted (DASH)  ___________________    ==================>> VITAL SIGNS <<==================    T(C): 36.5 (01-18-21 @ 13:29), Max: 36.8 (01-18-21 @ 05:25)  HR: 83 (01-18-21 @ 13:29) (82 - 86)  BP: 127/78 (01-18-21 @ 13:29) (127/78 - 151/54)  RR: 16 (01-18-21 @ 13:29) (16 - 16)  SpO2: 97% (01-18-21 @ 13:29) (95% - 97%)     I&O's Summary    17 Jan 2021 07:01  -  18 Jan 2021 07:00  --------------------------------------------------------  IN: 0 mL / OUT: 1800 mL / NET: -1800 mL      ==================>> LAB AND IMAGING <<==================                        10.7   14.54 )-----------( 232      ( 18 Jan 2021 07:36 )             34.3     WBC count:   14.54 <<== ,  16.10 <<==        01-18    136  |  102  |  33<H>  ----------------------------<  96  3.5   |  23  |  0.84    Ca    8.7      18 Jan 2021 07:36  Mg     1.5     01-18    TPro  6.2  /  Alb  2.9<L>  /  TBili  0.5  /  DBili  x   /  AST  23  /  ALT  10  /  AlkPhos  73  01-18    PT/INR - ( 17 Jan 2021 10:16 )   PT: 13.2 sec;   INR: 1.16 ratio    PTT - ( 17 Jan 2021 10:16 )  PTT:47.9 sec              TSH:      0.78   (01-06-21)           Lipid profile:  (01-18-21)     Total: 142     LDL  : (p)     HDL  :39     TG   :144     HgA1C:     (01-06-21)      5.4    ^^^ Inflammatory markers :  ^^^  C R P :          49.0 (01-18-21)  <<--, 5.01 (01-06-21)  <<--  P C T :         0.10 (01-18-21) <<--, 0.12 (01-06-21) <<--    Ferritin :         164 (01-18-21) <<--, 162 (01-11-21) <<--, 209 (01-06-21) <<--    D-Dimer :          513 (01-18-21) <<--, 369 (01-11-21) <<--, 573 (01-06-21) <<--    ___________________________________________________________________________________  ===============>>  A S S E S S M E N T   A N D   P L A N <<===============  ------------------------------------------------------------------------------------------    · Assessment	  71 yo female with medical history significant for Anxiety, Asthma, Breast cancer in situ, COPD (chronic obstructive pulmonary disease). DVT (deep venous thrombosis) not presently on A/C, Graves disease, Hyperlipidemia, Hypertension, Hypothyroid, Parathyroidectomy, Kidney cancer, primary, with metastasis from kidney to other site,  LEft sided nephrectomy only- no chemo or RT, Obesity, Sleep apnea comes in  with dehydration, ALDAIR, PNA, hypoxemia     Problem/Plan - 1:  ·  Problem: possible HCAP given recent admission, leukocytosis, bilateral lower lobe infiltrates on Xray  less likely Viral ( COVID) PNA> possible bacterial superimposed infection   pt started on abx in ED >> would continue for now  continue O2 and wean as able   monitor vitals, labs   pulm appreciated     Problem/Plan - 2:  ·  Problem: Acute hypoxemic respiratory failure due to COVID-19  CTA to rule out VTE per Pulm   treat underlying causes as above otherwise   Monitor respiratory status and supplement o2  monitor inflammatory markers.    Problem/Plan - 3:  ·  Problem: ALDAIR due to dehydration and pre-renal azothermia >> resolved post Hydration   holding further IVH for now  monitor crea closely post CTA  held diuretics, ACEI / ARB >> would resume in 24-48 hrs as able / needed     mgmt of Dh with imodium PRN     Problem/Plan - 4:  ·  Problem: Graves disease  Has h/o Graves disease, has exophthalmus  Also has scar on neck which she says has parathyroidectomy  Was on prednisone 5mg > will continue.    Problem/Plan - 5:  Problem: h/o HTN  holding amlodipine due to lower extremity edema  c/w other meds with parameters  monitor BP.  cardio f/u    Problem/Plan - 6:  ·  Problem: Hypothyroid  Has h/o hypothyroidism   c/w home dose levothyroxine    Problem/Plan - 7:  ·  Problem:  h/o COPD  c/w home meds  pulm f/u. and mgmt     Problem/Plan - 8:  ·  Problem: Prophylactic measure.   on lovenox for dvt prophylaxis.    --------------------------------------------  Case discussed with pt, medical team   Education given on findings and plan of care  ___________________________  H. JANICE Welch.  Pager: [phone]     _________________________________________________________________________________________  ========>>  M E D I C A L   A T T E N D I N G    F O L L O W  U P  N O T E  <<=========  -----------------------------------------------------------------------------------------------------    - Patient seen and examined by me earlier today.   - In summary,  SHEKHAR VASQUEZ is a 70y year old woman admitted with SOB  - Patient today overall doing ok, comfortable, eating fairly,      ( there is reportedly concern for possible blood in stool  yesterday ( pt not giving clear history > given slight drop in Hgl ( likely dilutional) , holding AC and ASA until repeat CBC is stable )     ==================>> REVIEW OF SYSTEM <<=================    GEN: no fever, no chills, no pain  RESP: mild SOB, + occasional cough, no sputum  CVS: no chest pain, no palpitations, no edema  GI: no abdominal pain, no nausea, no constipation, no diarrhea  : no dysuria, no frequency, no hematuria  Neuro: no headache, no dizziness  Derm : no itching, no rash    ==================>> PHYSICAL EXAM <<=================    GEN: A&O X 3 , NAD , comfortable  HEENT: NCAT, PERRL, MMM, hearing intact  Neck: supple , no JVD appreciated  CVS: S1S2 , regular , No M/R/G appreciated  PULM: CTA B/L,  no W/R/R appreciated  ABD.: soft. non tender, non distended,  obese   Extrem: intact pulses , no edema   PSYCH : normal mood,  a bit anxious      ==================>> MEDICATIONS <<====================    MEDICATIONS  (STANDING):  ALBUTerol    90 MICROgram(s) HFA Inhaler 1 Puff(s) Inhalation every 4 hours  amLODIPine   Tablet 5 milliGRAM(s) Oral daily  atorvastatin 40 milliGRAM(s) Oral at bedtime  azithromycin  IVPB 500 milliGRAM(s) IV Intermittent every 24 hours  cefepime   IVPB 1000 milliGRAM(s) IV Intermittent daily  cholecalciferol 1000 Unit(s) Oral daily  gabapentin 100 milliGRAM(s) Oral at bedtime  hydrALAZINE 25 milliGRAM(s) Oral every 8 hours  influenza   Vaccine 0.5 milliLiter(s) IntraMuscular once  magnesium oxide 400 milliGRAM(s) Oral three times a day with meals  melatonin 3 milliGRAM(s) Oral at bedtime  montelukast 10 milliGRAM(s) Oral at bedtime  multivitamin 1 Tablet(s) Oral daily  nystatin Powder 1 Application(s) Topical once  OLANZapine 10 milliGRAM(s) Oral daily  predniSONE   Tablet 5 milliGRAM(s) Oral two times a day  sertraline 100 milliGRAM(s) Oral daily  tiotropium 18 MICROgram(s) Capsule 1 Capsule(s) Inhalation daily    MEDICATIONS  (PRN):  acetaminophen   Tablet .. 650 milliGRAM(s) Oral every 6 hours PRN Temp greater or equal to 38C (100.4F), Mild Pain (1 - 3), Moderate Pain (4 - 6)  baclofen 10 milliGRAM(s) Oral every 8 hours PRN back pain and spasm  loperamide 2 milliGRAM(s) Oral every 8 hours PRN Diarrhea    ___________  Active diet:  Diet, Regular:   DASH/TLC Sodium & Cholesterol Restricted (DASH)  ___________________    ==================>> VITAL SIGNS <<==================    T(C): 36.5 (01-18-21 @ 13:29), Max: 36.8 (01-18-21 @ 05:25)  HR: 83 (01-18-21 @ 13:29) (82 - 86)  BP: 127/78 (01-18-21 @ 13:29) (127/78 - 151/54)  RR: 16 (01-18-21 @ 13:29) (16 - 16)  SpO2: 97% (01-18-21 @ 13:29) (95% - 97%)     I&O's Summary    17 Jan 2021 07:01  -  18 Jan 2021 07:00  --------------------------------------------------------  IN: 0 mL / OUT: 1800 mL / NET: -1800 mL      ==================>> LAB AND IMAGING <<==================                        10.7   14.54 )-----------( 232      ( 18 Jan 2021 07:36 )             34.3     WBC count:   14.54 <<== ,  16.10 <<==        01-18    136  |  102  |  33<H>  ----------------------------<  96  3.5   |  23  |  0.84    Ca    8.7      18 Jan 2021 07:36  Mg     1.5     01-18    TPro  6.2  /  Alb  2.9<L>  /  TBili  0.5  /  DBili  x   /  AST  23  /  ALT  10  /  AlkPhos  73  01-18    PT/INR - ( 17 Jan 2021 10:16 )   PT: 13.2 sec;   INR: 1.16 ratio    PTT - ( 17 Jan 2021 10:16 )  PTT:47.9 sec              TSH:      0.78   (01-06-21)           Lipid profile:  (01-18-21)     Total: 142     LDL  : (p)     HDL  :39     TG   :144     HgA1C:     (01-06-21)      5.4    ^^^ Inflammatory markers :  ^^^  C R P :          49.0 (01-18-21)  <<--, 5.01 (01-06-21)  <<--  P C T :         0.10 (01-18-21) <<--, 0.12 (01-06-21) <<--    Ferritin :         164 (01-18-21) <<--, 162 (01-11-21) <<--, 209 (01-06-21) <<--    D-Dimer :          513 (01-18-21) <<--, 369 (01-11-21) <<--, 573 (01-06-21) <<--    ___________________________________________________________________________________  ===============>>  A S S E S S M E N T   A N D   P L A N <<===============  ------------------------------------------------------------------------------------------    · Assessment	  69 yo female with medical history significant for Anxiety, Asthma, Breast cancer in situ, COPD (chronic obstructive pulmonary disease). DVT (deep venous thrombosis) not presently on A/C, Graves disease, Hyperlipidemia, Hypertension, Hypothyroid, Parathyroidectomy, Kidney cancer, primary, with metastasis from kidney to other site,  LEft sided nephrectomy only- no chemo or RT, Obesity, Sleep apnea comes in  with dehydration, ALDAIR, PNA, hypoxemia     Problem/Plan - 1:  ·  Problem: possible HCAP given recent admission, leukocytosis, bilateral lower lobe infiltrates on Xray  less likely Viral ( COVID) PNA> possible bacterial superimposed infection   pt started on abx in ED >> would continue for now  continue O2 and wean as able   monitor vitals, labs   pulm appreciated     Problem/Plan - 2:  ·  Problem: Acute hypoxemic respiratory failure due to COVID-19  CTA to rule out VTE per Pulm   treat underlying causes as above otherwise   Monitor respiratory status and supplement o2  monitor inflammatory markers.    Problem/Plan - 3:  ·  Problem: ALDAIR due to dehydration and pre-renal azothermia >> resolved post Hydration   holding further IVH for now  monitor crea closely post CTA  held diuretics, ACEI / ARB >> would resume in 24-48 hrs as able / needed     mgmt of Dh with imodium PRN     Problem/Plan - 4:  ·  Problem: Graves disease  Has h/o Graves disease, has exophthalmus  Also has scar on neck which she says has parathyroidectomy  Was on prednisone 5mg > will continue.    Problem/Plan - 5:  Problem:  HTN  c/w home meds with parameters  monitor BP.  cardio f/u    Problem/Plan - 6:  ·  Problem: Hypothyroid  Has h/o hypothyroidism   c/w home dose levothyroxine    Problem/Plan - 7:  ·  Problem:  h/o COPD  c/w home meds  pulm f/u. and mgmt     Problem/Plan - 8:  ·  Problem: Prophylactic measure.   on lovenox for dvt prophylaxis.    --------------------------------------------  Case discussed with pt, medical team   Education given on findings and plan of care  ___________________________  HRenetta Welch D.O.  Pager: [phone]

## 2021-01-18 NOTE — CONSULT NOTE ADULT - SUBJECTIVE AND OBJECTIVE BOX
CHIEF COMPLAINT: sob    HISTORY OF PRESENT ILLNESS:  69 yo woman with medical history significant for Anxiety, Asthma, Breast cancer in situ, COPD (chronic obstructive pulmonary disease). past DVT not presently on A/C, Graves disease, Hyperlipidemia, Hypertension, Hypothyroid, Parathyroidectomy, Kidney cancer, primary, with metastasis from kidney to other site,  LEft sided nephrectomy only- no chemo or RT, Obesity, Sleep apnea, recently admitted to [facility] for COVID and cellulitis, finished Remdesivir and Decadron, discharged home on 2-3 L O2, now comes in with decreased Po intak enad dehydration  pt in ED found to have leukocytosis, ALDAIR, remains hypoxemic  pt treated for pneumonia, dehydration and ALDAIR  pt denies fever, chills, significant cough or sputum  pt states has been having diarrhea, which she chronically has had and takes imodium for at home     Allergies  Grapes (Hives)  No Known Drug Allergies  Peaches (Hives)  shellfish (Hives)    Intolerances    	    MEDICATIONS:  amLODIPine   Tablet 5 milliGRAM(s) Oral daily  aspirin enteric coated 81 milliGRAM(s) Oral daily  heparin   Injectable 7500 Unit(s) SubCutaneous every 12 hours  hydrALAZINE 25 milliGRAM(s) Oral every 8 hours  azithromycin  IVPB 500 milliGRAM(s) IV Intermittent every 24 hours  cefepime   IVPB 1000 milliGRAM(s) IV Intermittent daily  ALBUTerol    90 MICROgram(s) HFA Inhaler 1 Puff(s) Inhalation every 4 hours  montelukast 10 milliGRAM(s) Oral at bedtime  tiotropium 18 MICROgram(s) Capsule 1 Capsule(s) Inhalation daily  acetaminophen   Tablet .. 650 milliGRAM(s) Oral every 6 hours PRN  baclofen 10 milliGRAM(s) Oral every 8 hours PRN  gabapentin 100 milliGRAM(s) Oral at bedtime  melatonin 3 milliGRAM(s) Oral at bedtime  OLANZapine 10 milliGRAM(s) Oral daily  sertraline 100 milliGRAM(s) Oral daily  loperamide 2 milliGRAM(s) Oral every 8 hours PRN  atorvastatin 40 milliGRAM(s) Oral at bedtime  predniSONE   Tablet 5 milliGRAM(s) Oral two times a day  cholecalciferol 1000 Unit(s) Oral daily  influenza   Vaccine 0.5 milliLiter(s) IntraMuscular once  multivitamin 1 Tablet(s) Oral daily  nystatin Powder 1 Application(s) Topical once  sodium chloride 0.9%. 1000 milliLiter(s) IV Continuous <Continuous>      PAST MEDICAL & SURGICAL HISTORY:  Asthma    Sleep apnea    Obesity    DVT (deep venous thrombosis)  history of- not presently on A/C    Hypothyroid    COPD (chronic obstructive pulmonary disease)    Breast cancer in situ, left    Kidney cancer, primary, with metastasis from kidney to other site, left  LEft sided- s/p nephrectomy only- no chemo or RT    Graves disease    Anxiety    Hyperlipidemia    Hypertension    S/P laminectomy  now bed and wheelchair ridden with severe pain    History of parathyroidectomy    History of carpal tunnel release  right wrist    History of eye surgery  7 surgeries secondary to grave&#x27;s disease    History of pelvic surgery  Pelvic Sling    S/P breast biopsy  left    S/p nephrectomy  left    S/P cholecystectomy        FAMILY HISTORY:  Family history of thyroid cancer (Child)  daughter has thyroid cancer    Family history of heart disease (Sibling)  brother has a PPM    Family history of diabetes mellitus (Sibling)  siblings    Family history of hypertension  mother and father    Family history of myocardial infarction  mother  at age 67 and father at age 67 of MI&#x27;s        SOCIAL HISTORY:    non smoker. dependent on adls      REVIEW OF SYSTEMS:  See HPI, otherwise complete 10 point review of systems negative    [ ] All others negative	      PHYSICAL EXAM:  T(C): 36.8 (21 @ 05:25), Max: 36.8 (21 @ 09:22)  HR: 86 (21 @ 05:25) (73 - 110)  BP: 137/65 (21 @ 05:25) (116/89 - 151/54)  RR: 16 (21 @ 05:25) (16 - 22)  SpO2: 95% (21 @ 05:25) (93% - 97%)  Wt(kg): --  I&O's Summary    2021 07:01  -  2021 07:00  --------------------------------------------------------  IN: 0 mL / OUT: 1800 mL / NET: -1800 mL        Appearance: No Acute Distress	  HEENT:  Normal oral mucosa, PERRL, EOMI	  Cardiovascular: Normal S1 S2, No JVD, No murmurs/rubs/gallops  Respiratory: Lungs clear to auscultation bilaterally  Gastrointestinal:  Soft, Non-tender, + BS	  Skin: No rashes, No ecchymoses, No cyanosis	  Neurologic: Non-focal  Extremities: No clubbing, cyanosis or edema  Vascular: Peripheral pulses palpable 2+ bilaterally  Psychiatry: A & O x 3, Mood & affect appropriate    Laboratory Data:	 	    CBC Full  -  ( 2021 07:36 )  WBC Count : 14.54 K/uL  Hemoglobin : 10.7 g/dL  Hematocrit : 34.3 %  Platelet Count - Automated : 232 K/uL  Mean Cell Volume : 87.9 fL  Mean Cell Hemoglobin : 27.4 pg  Mean Cell Hemoglobin Concentration : 31.2 gm/dL  Auto Neutrophil # : 11.46 K/uL  Auto Lymphocyte # : 1.46 K/uL  Auto Monocyte # : 1.46 K/uL  Auto Eosinophil # : 0.03 K/uL  Auto Basophil # : 0.01 K/uL  Auto Neutrophil % : 78.9 %  Auto Lymphocyte % : 10.0 %  Auto Monocyte % : 10.0 %  Auto Eosinophil % : 0.2 %  Auto Basophil % : 0.1 %        137  |  94<L>  |  58<H>  ----------------------------<  107<H>  4.2   |  24  |  2.98<H>    Ca    9.3      2021 10:16    TPro  7.4  /  Alb  3.8  /  TBili  0.6  /  DBili  x   /  AST  24  /  ALT  11  /  AlkPhos  85  -17          ECG:  	not uploaded      Assessment:  sob  covid pna  aldair  htn  venous insufficiency  melba  copd  anxiety      Recs:  cv status appears stable  consider xarelto 10mg x 30 days  cw current antihypertensives  diuetics on hold in setting of aldair. doesnt appear vol up. can check bnp  care per medicine            Greater than 60 minutes spent on total encounter; more than 50% of the visit was spent counseling and/or coordinating care by the attending physician.   	  Juan Salcedo MD   Cardiovascular Diseases  [phone]

## 2021-01-18 NOTE — ADVANCED PRACTICE NURSE CONSULT - REASON FOR CONSULT
Attempted to see patient for skin impairment and topical recommendation's. Patient is currently on RRT with team at the bedside, preparing for possible intubation. Will follow up. 
Patient seen on skin care rounds after wound care referral received from nursing for assessment of skin impairment and recommendations of topical management. Chart reviewed: Jayson 16, BMI 38.1kg/m2, WBC 11.76, Hemoglobin A1C 5.4%, BC negative. Patient H/O of Anxiety, Asthma, Breast cancer in situ, COPD (chronic obstructive pulmonary disease). DVT (deep venous thrombosis) not presently on A/C, Graves disease, Hyperlipidemia, Hypertension, Hypothyroid, Parathyroidectomy, Kidney cancer, primary, with metastasis from kidney to other site,  LEft sided nephrectomy only- no chemo or RT, Obesity, Sleep apnea comes in  with dehydration, ALDAIR, PNA, hypoxemia.

## 2021-01-18 NOTE — ADVANCED PRACTICE NURSE CONSULT - RECOMMEDATIONS
Protect posterior ears from surgical masks, apply Apply Liquid barrier film to posterior ears   Pending CT of the chest to r/o PE     Topical Recommendations     Offload nasal cannula with offloading trach tie (Posey)    All intertriginous folds: Clean with soap and water. Pat dry. Place Interdry textile sheeting, under intertriginous folds leaving 2 inches exposed at ends to wick, remove to wash & dry affected area, then replace. Individual sheeting may be used for up to 5 days unless soiled.     Bilateral heels: Apply Liquid barrier film daily.     Sween 24 moisturizer to lower extremities, daily.     Sacral fold and perineum: Apply Liquid barrier film twice a day.     Continue low air loss bed therapy, heel elevation with Z-flex fluidized positioning boots, turn & reposition q2h with Z-andrea fluidized positioning device, soft pillow behind legs and knees, continue moisture management with barrier creams & single breathable pad, continue measures to decrease friction/shear/pressure. Continue with nutritional support as per dietary/orders.    Please contact Wound Care Service Line if we can be of further assistance (ext [phone]).   
Please contact Wound Care Service Line if we can be of further assistance (ext [phone]).

## 2021-01-18 NOTE — ADVANCED PRACTICE NURSE CONSULT - ASSESSMENT
A&Ox4, received wearing glasses, surgical masks during COVID 19 Pandemic and supplemental oxygen via nasal cannula, wheelchairbound, incontinent of urine and stool. External female  in place (Primafit) Skin warm, dry with increased moisture in intertriginous folds, adequate skin turgor, scattered areas of hyperpigmentation and hypopigmentation, scattered areas of ecchymosis on bilateral upper extremities and abdominal area. Blanchable erythema on bilateral heels. Bilateral foot drop. Legs with areas of hypopigmentation and dry flaky skin. No open ulcers. No drainage.     Moisture associated dermatitis in ABD pannus, groin and sacral fold as evident by moist. Inner buttocks with areas of hypopigmentation from previously healed skin injuries.     At risk for Incontinence associated dermatitis intact skin.     Findings discussed with bedside RN

## 2021-01-18 NOTE — PHYSICAL THERAPY INITIAL EVALUATION ADULT - BALANCE DISTURBANCE, SYSTEM IMPAIRMENT CONTRIBUTE, REHAB EVAL
musculoskeletal Amberly GARCIA: Patient's labs without acute abnormalities.  Discussed thyroid studies can take up to 24 hours.  Patient verbalized understanding and given follow-up for Neurology.

## 2021-01-18 NOTE — PHYSICAL THERAPY INITIAL EVALUATION ADULT - PERTINENT HX OF CURRENT PROBLEM, REHAB EVAL
Pt is a 70 year old female presenting with dyspnea and fever. Pt admitted to medicine for acute hypoxic respiratory failure secondary to COVID-19 infection. PMH: Anxiety, Asthma, Breast cancer in situ, COPD, DVT, Graves disease, Hyperlipidemia, Hypertension, Hypothyroid, Parathyroidectomy, Kidney cancer, primary, with metastasis from kidney to other site, left sided nephrectomy only- no chemo or RT, Obesity, Sleep apnea.

## 2021-01-18 NOTE — CONSULT NOTE ADULT - SUBJECTIVE AND OBJECTIVE BOX
PULMONARY CONSULT NOTE      SHEKHAR VASQUEZ  MRN-[id number]    Patient is a 70y old  Female who presents with a chief complaint of Dyspnea (2021 08:11)      HISTORY OF PRESENT ILLNESS:        Allergies    Grapes (Hives)  No Known Drug Allergies  Peaches (Hives)  shellfish (Hives)    Intolerances        PAST MEDICAL & SURGICAL HISTORY:  Asthma    Sleep apnea    Obesity    DVT (deep venous thrombosis)  history of- not presently on A/C    Hypothyroid    COPD (chronic obstructive pulmonary disease)    Breast cancer in situ, left    Kidney cancer, primary, with metastasis from kidney to other site, left  LEft sided- s/p nephrectomy only- no chemo or RT    Graves disease    Anxiety    Hyperlipidemia    Hypertension    S/P laminectomy  now bed and wheelchair ridden with severe pain    History of parathyroidectomy    History of carpal tunnel release  right wrist    History of eye surgery  7 surgeries secondary to grave&#x27;s disease    History of pelvic surgery  Pelvic Sling    S/P breast biopsy  left    S/p nephrectomy  left    S/P cholecystectomy            FAMILY HISTORY:  Family history of thyroid cancer (Child)  daughter has thyroid cancer    Family history of heart disease (Sibling)  brother has a PPM    Family history of diabetes mellitus (Sibling)  siblings    Family history of hypertension  mother and father    Family history of myocardial infarction  mother  at age 67 and father at age 67 of MI&#x27;s      Prescriptions:  albuterol 90 mcg/inh inhalation aerosol: 1 puff(s) inhaled every 4 hours  (2021 16:02)  nystatin 100,000 units/g topical powder: 1 application topically 2 times a day (2021 16:02)  Oxygen: Portable and continuous oxygen to be used at 3LPM via nasal canula 24x7     Oxygen sats @ rest 93% on room air  Oxygen sats with exacerbation 77% on room air  Oxygen sats 96% on 3L via n/c on ambulation (2021 15:29)      SOCIAL HISTORY  Smoking History:     REVIEW OF SYSTEMS:    CONSTITUTIONAL:  No fevers, chills, sweats    HEENT:  Eyes:  No diplopia or blurred vision. ENT:  No earache, sore throat or runny nose.    CARDIOVASCULAR:  No pressure, squeezing, tightness, or heaviness about the chest; no palpitations.    RESPIRATORY:  Per HPI    GASTROINTESTINAL:  No abdominal pain, nausea, vomiting or diarrhea.    GENITOURINARY:  No dysuria, frequency or urgency.    NEUROLOGIC:  No paresthesias, fasciculations, seizures or weakness.    PSYCHIATRIC:  No disorder of thought or mood.    Vital Signs Last 24 Hrs  T(C): 36.8 (2021 05:25), Max: 36.8 (2021 15:15)  T(F): 98.2 (2021 05:25), Max: 98.3 (2021 15:15)  HR: 86 (2021 05:25) (73 - 86)  BP: 137/65 (2021 05:25) (137/65 - 151/54)  BP(mean): --  RR: 16 (2021 05:25) (16 - 18)  SpO2: 95% (2021 05:25) (95% - 97%)    PHYSICAL EXAMINATION:    GENERAL: The patient is a well-developed, well-nourished _____in no apparent distress.     HEENT: Head is normocephalic and atraumatic. Extraocular muscles are intact. Mucous membranes are moist.     NECK: Supple.     LUNGS: Clear to auscultation without wheezing, rales, or rhonchi. Respirations unlabored    HEART: Regular rate and rhythm without murmur.    ABDOMEN: Soft, nontender, and nondistended.  No hepatosplenomegaly is noted.    EXTREMITIES: Without any cyanosis, clubbing, rash, lesions or edema.    NEUROLOGIC: Grossly intact      MEDICATIONS  (STANDING):  ALBUTerol    90 MICROgram(s) HFA Inhaler 1 Puff(s) Inhalation every 4 hours  amLODIPine   Tablet 5 milliGRAM(s) Oral daily  aspirin enteric coated 81 milliGRAM(s) Oral daily  atorvastatin 40 milliGRAM(s) Oral at bedtime  azithromycin  IVPB 500 milliGRAM(s) IV Intermittent every 24 hours  cefepime   IVPB 1000 milliGRAM(s) IV Intermittent daily  cholecalciferol 1000 Unit(s) Oral daily  gabapentin 100 milliGRAM(s) Oral at bedtime  heparin   Injectable 7500 Unit(s) SubCutaneous every 12 hours  hydrALAZINE 25 milliGRAM(s) Oral every 8 hours  influenza   Vaccine 0.5 milliLiter(s) IntraMuscular once  magnesium oxide 400 milliGRAM(s) Oral three times a day with meals  melatonin 3 milliGRAM(s) Oral at bedtime  montelukast 10 milliGRAM(s) Oral at bedtime  multivitamin 1 Tablet(s) Oral daily  nystatin Powder 1 Application(s) Topical once  OLANZapine 10 milliGRAM(s) Oral daily  predniSONE   Tablet 5 milliGRAM(s) Oral two times a day  sertraline 100 milliGRAM(s) Oral daily  tiotropium 18 MICROgram(s) Capsule 1 Capsule(s) Inhalation daily      MEDICATIONS  (PRN):  acetaminophen   Tablet .. 650 milliGRAM(s) Oral every 6 hours PRN Temp greater or equal to 38C (100.4F), Mild Pain (1 - 3), Moderate Pain (4 - 6)  baclofen 10 milliGRAM(s) Oral every 8 hours PRN back pain and spasm  loperamide 2 milliGRAM(s) Oral every 8 hours PRN Diarrhea        LABS:   CBC Full  -  ( 2021 07:36 )  WBC Count : 14.54 K/uL  RBC Count : 3.90 M/uL  Hemoglobin : 10.7 g/dL  Hematocrit : 34.3 %  Platelet Count - Automated : 232 K/uL  Mean Cell Volume : 87.9 fL  Mean Cell Hemoglobin : 27.4 pg  Mean Cell Hemoglobin Concentration : 31.2 gm/dL  Auto Neutrophil # : 11.46 K/uL  Auto Lymphocyte # : 1.46 K/uL  Auto Monocyte # : 1.46 K/uL  Auto Eosinophil # : 0.03 K/uL  Auto Basophil # : 0.01 K/uL  Auto Neutrophil % : 78.9 %  Auto Lymphocyte % : 10.0 %  Auto Monocyte % : 10.0 %  Auto Eosinophil % : 0.2 %  Auto Basophil % : 0.1 %    PT/INR - ( 2021 10:16 )   PT: 13.2 sec;   INR: 1.16 ratio         PTT - ( 2021 10:16 )  PTT:47.9 sec      136  |  102  |  33<H>  ----------------------------<  96  3.5   |  23  |  0.84    Ca    8.7      2021 07:36  Mg     1.5         TPro  6.2  /  Alb  2.9<L>  /  TBili  0.5  /  DBili  x   /  AST  23  /  ALT  10  /  AlkPhos  73                  RADIOLOGY & ADDITIONAL STUDIES:  rad< from: Xray Chest 1 View- PORTABLE-Urgent (Xray Chest 1 View- PORTABLE-Urgent .) (21 @ 11:14) >    EXAM:  XR CHEST PORTABLE URGENT 1V        PROCEDURE DATE:  2021         INTERPRETATION:  CLINICAL INFORMATION: Evaluate for pneumonia.    TECHNIQUE: Frontal radiograph of the chest.    COMPARISON: Chest x-ray dated 2021.    FINDINGS:    LUNGS: Increased markings in the right greater than left lung bases may represent pneumonia.    PLEURA: No pleural effusion or pneumothorax.    HEART AND MEDIASTINUM: Heart is enlarged..    SKELETON: Degenerative changes.      IMPRESSION:    Suggestion of bilateral lower lobe pneumonia right greater than left            < end of copied text >  < from: US Duplex Venous Lower Ext Complete, Bilateral (21 @ 16:36) >    EXAM:  US DPLX LWR EXT VEINS COMPL BI                            PROCEDURE DATE:  2021          INTERPRETATION:  CLINICAL INFORMATION: Lower extremity swelling. Covid positive.    COMPARISON: 2019.    TECHNIQUE: Duplex sonography of theBILATERAL LOWER extremity veins with color and spectral Doppler, with and without compression.    FINDINGS:    There is normal compressibility of the bilateral common femoral, femoral and popliteal veins.  Doppler examination shows normal spontaneousand phasic flow.    No calf vein thrombosis is detected. Visualization of the calf veins is limited due to soft tissue edema and hence if needed follow-up imaging can be obtained in 5-7 days.    IMPRESSION:  No evidence of deep venous thrombosis in either lower extremity as described above.                AMISH MURRAY MD; Attending Radiologist  This document has been electronically signed. 2021  4:38PM    < end of copied text >    ECHO:    ASSESSMENT:    PLAN:      Thank you for allowing me to participate in the care of this patient.  Please feel free to call me for any questions/concerns.      Uma Moses DO  [facility] Pulmonary/Sleep Medicine  [phone] PULMONARY CONSULT NOTE      SHEKHAR VASQUEZ  MRN-[id number]    Patient is a 70y old  Female who presents with a chief complaint of Dyspnea (2021 08:11)      HISTORY OF PRESENT ILLNESS:  71 yo female - former smoker, 30 pack year, quit 30 years ago, with breast ca and renal ca , Anxiety, past DVT not presently on A/C, Graves disease, Hyperlipidemia, Hypertension, Hypothyroid, Parathyroidectomy, Kidney cancer, primary, with metastasis from kidney to other site,  LEft sided nephrectomy only- no chemo or RT, Obesity, Sleep apnea, recently admitted to [place] for COVID and cellulitis, finished Remdesivir and Decadron, discharged home on 2-3 L O2 ( admitted - )  was discharged on aspirin 81mg and 2L of 02    admitted to [facility] for dyspnea, hypoxia and decrease po intake  today - on 4L- less dyspnea. anxious +, some cough- no fevers at home, no chills. not on any inhalers. never seen pulm           Allergies    Grapes (Hives)  No Known Drug Allergies  Peaches (Hives)  shellfish (Hives)    Intolerances        PAST MEDICAL & SURGICAL HISTORY:  Asthma    Sleep apnea    Obesity    DVT (deep venous thrombosis)  history of- not presently on A/C    Hypothyroid    COPD (chronic obstructive pulmonary disease)    Breast cancer in situ, left    Kidney cancer, primary, with metastasis from kidney to other site, left  LEft sided- s/p nephrectomy only- no chemo or RT    Graves disease    Anxiety    Hyperlipidemia    Hypertension    S/P laminectomy  now bed and wheelchair ridden with severe pain    History of parathyroidectomy    History of carpal tunnel release  right wrist    History of eye surgery  7 surgeries secondary to grave&#x27;s disease    History of pelvic surgery  Pelvic Sling    S/P breast biopsy  left    S/p nephrectomy  left    S/P cholecystectomy            FAMILY HISTORY:  Family history of thyroid cancer (Child)  daughter has thyroid cancer    Family history of heart disease (Sibling)  brother has a PPM    Family history of diabetes mellitus (Sibling)  siblings    Family history of hypertension  mother and father    Family history of myocardial infarction  mother  at age 67 and father at age 67 of MI&#x27;s      Prescriptions:  albuterol 90 mcg/inh inhalation aerosol: 1 puff(s) inhaled every 4 hours  (2021 16:02)  nystatin 100,000 units/g topical powder: 1 application topically 2 times a day (2021 16:02)  Oxygen: Portable and continuous oxygen to be used at 3LPM via nasal canula 24x7     Oxygen sats @ rest 93% on room air  Oxygen sats with exacerbation 77% on room air  Oxygen sats 96% on 3L via n/c on ambulation (2021 15:29)      SOCIAL HISTORY  Smoking History:   quit 30 years ago; 30 pack year    REVIEW OF SYSTEMS:    CONSTITUTIONAL:  No fevers, chills, sweats    HEENT:  Eyes:  No diplopia or blurred vision. ENT:  No earache, sore throat or runny nose.    CARDIOVASCULAR:  No pressure, squeezing, tightness, or heaviness about the chest; no palpitations.    RESPIRATORY:  Per HPI    GASTROINTESTINAL:  No abdominal pain, nausea, vomiting or diarrhea.    GENITOURINARY:  No dysuria, frequency or urgency.    NEUROLOGIC:  No paresthesias, fasciculations, seizures or weakness.    PSYCHIATRIC:  No disorder of thought or mood.    Vital Signs Last 24 Hrs  T(C): 36.8 (2021 05:25), Max: 36.8 (2021 15:15)  T(F): 98.2 (2021 05:25), Max: 98.3 (2021 15:15)  HR: 86 (2021 05:25) (73 - 86)  BP: 137/65 (2021 05:25) (137/65 - 151/54)  BP(mean): --  RR: 16 (2021 05:25) (16 - 18)  SpO2: 95% (2021 05:25) (95% - 97%)    PHYSICAL EXAMINATION:    GENERAL: The patient is an elderly female in nad  anxious  aaox3  unlabored on 6L  obese         MEDICATIONS  (STANDING):  ALBUTerol    90 MICROgram(s) HFA Inhaler 1 Puff(s) Inhalation every 4 hours  amLODIPine   Tablet 5 milliGRAM(s) Oral daily  aspirin enteric coated 81 milliGRAM(s) Oral daily  atorvastatin 40 milliGRAM(s) Oral at bedtime  azithromycin  IVPB 500 milliGRAM(s) IV Intermittent every 24 hours  cefepime   IVPB 1000 milliGRAM(s) IV Intermittent daily  cholecalciferol 1000 Unit(s) Oral daily  gabapentin 100 milliGRAM(s) Oral at bedtime  heparin   Injectable 7500 Unit(s) SubCutaneous every 12 hours  hydrALAZINE 25 milliGRAM(s) Oral every 8 hours  influenza   Vaccine 0.5 milliLiter(s) IntraMuscular once  magnesium oxide 400 milliGRAM(s) Oral three times a day with meals  melatonin 3 milliGRAM(s) Oral at bedtime  montelukast 10 milliGRAM(s) Oral at bedtime  multivitamin 1 Tablet(s) Oral daily  nystatin Powder 1 Application(s) Topical once  OLANZapine 10 milliGRAM(s) Oral daily  predniSONE   Tablet 5 milliGRAM(s) Oral two times a day  sertraline 100 milliGRAM(s) Oral daily  tiotropium 18 MICROgram(s) Capsule 1 Capsule(s) Inhalation daily      MEDICATIONS  (PRN):  acetaminophen   Tablet .. 650 milliGRAM(s) Oral every 6 hours PRN Temp greater or equal to 38C (100.4F), Mild Pain (1 - 3), Moderate Pain (4 - 6)  baclofen 10 milliGRAM(s) Oral every 8 hours PRN back pain and spasm  loperamide 2 milliGRAM(s) Oral every 8 hours PRN Diarrhea        LABS:   CBC Full  -  ( 2021 07:36 )  WBC Count : 14.54 K/uL  RBC Count : 3.90 M/uL  Hemoglobin : 10.7 g/dL  Hematocrit : 34.3 %  Platelet Count - Automated : 232 K/uL  Mean Cell Volume : 87.9 fL  Mean Cell Hemoglobin : 27.4 pg  Mean Cell Hemoglobin Concentration : 31.2 gm/dL  Auto Neutrophil # : 11.46 K/uL  Auto Lymphocyte # : 1.46 K/uL  Auto Monocyte # : 1.46 K/uL  Auto Eosinophil # : 0.03 K/uL  Auto Basophil # : 0.01 K/uL  Auto Neutrophil % : 78.9 %  Auto Lymphocyte % : 10.0 %  Auto Monocyte % : 10.0 %  Auto Eosinophil % : 0.2 %  Auto Basophil % : 0.1 %    PT/INR - ( 2021 10:16 )   PT: 13.2 sec;   INR: 1.16 ratio         PTT - ( 2021 10:16 )  PTT:47.9 sec      136  |  102  |  33<H>  ----------------------------<  96  3.5   |  23  |  0.84    Ca    8.7      2021 07:36  Mg     1.5         TPro  6.2  /  Alb  2.9<L>  /  TBili  0.5  /  DBili  x   /  AST  23  /  ALT  10  /  AlkPhos  73                  RADIOLOGY & ADDITIONAL STUDIES:  rad< from: Xray Chest 1 View- PORTABLE-Urgent (Xray Chest 1 View- PORTABLE-Urgent .) (21 @ 11:14) >    EXAM:  XR CHEST PORTABLE URGENT 1V        PROCEDURE DATE:  2021         INTERPRETATION:  CLINICAL INFORMATION: Evaluate for pneumonia.    TECHNIQUE: Frontal radiograph of the chest.    COMPARISON: Chest x-ray dated 2021.    FINDINGS:    LUNGS: Increased markings in the right greater than left lung bases may represent pneumonia.    PLEURA: No pleural effusion or pneumothorax.    HEART AND MEDIASTINUM: Heart is enlarged..    SKELETON: Degenerative changes.      IMPRESSION:    Suggestion of bilateral lower lobe pneumonia right greater than left            < end of copied text >  < from: US Duplex Venous Lower Ext Complete, Bilateral (21 @ 16:36) >    EXAM:  US DPLX LWR EXT VEINS COMPL BI                            PROCEDURE DATE:  2021          INTERPRETATION:  CLINICAL INFORMATION: Lower extremity swelling. Covid positive.    COMPARISON: 2019.    TECHNIQUE: Duplex sonography of theBILATERAL LOWER extremity veins with color and spectral Doppler, with and without compression.    FINDINGS:    There is normal compressibility of the bilateral common femoral, femoral and popliteal veins.  Doppler examination shows normal spontaneousand phasic flow.    No calf vein thrombosis is detected. Visualization of the calf veins is limited due to soft tissue edema and hence if needed follow-up imaging can be obtained in 5-7 days.    IMPRESSION:  No evidence of deep venous thrombosis in either lower extremity as described above.                AMISH MURRAY MD; Attending Radiologist  This document has been electronically signed. 2021  4:38PM    < end of copied text >    ECHO: ester< from: TTE with Doppler (w/Cont) (19 @ 08:12) >    ------------------------------------------------------------------------  CONCLUSIONS:  Technically difficult study.  1. Mitral annular calcification, otherwise normal mitral  valve. Minimal mitral regurgitation.  2. Normal left ventricular internal dimensions and wall  thicknesses.  3. Endocardium not well visualized; grossly normal left  ventricular systolic function.  Endocardial visualization  enhanced with intravenous injection of echo contrast  (Definity).  4. Unable to accurately evaluate right ventricular size or  systolic function.  ------------------------------------------------------------------------  Confirmed on  2019 - 09:20:22 by Rachid Mills M.D.  ------------------------------------------------------------------------    < end of copied text >      ASSESSMENT:  74 yo with covid 19 admitted for hypoxia- covid 19? or chf? or VTE?    PLAN:  s/p remedsivir & dexamethasone already from previous hospitalization   high dimer -suggest CTA to r/o PE   US dopplers noted are negative  trend inflammatory markers- if no PE- agree with xarelto outpatient  nebs PRN  can start symbicort  taper 02 at rest/ awake- goal pulse ox 92-95%  she may need higher 02 at night when sleeping for her probable NOEMY  procal normal- defer to primary team but may not need abx  incentive spirometer  check TTE  check probnp  dvt prophylaxis for now based on BMI  needs formal pfts and sleep study outpatient- has our office info    Thank you for allowing me to participate in the care of this patient.  Please feel free to call me for any questions/concerns.      Uma Moses,   [facility] Pulmonary/Sleep Medicine  [phone]

## 2021-01-18 NOTE — CHART NOTE - NSCHARTNOTEFT_GEN_A_CORE
PA medicine note    Pt. noted with hgb of 10.7 on AM lab work. Hemoglobin/hematocrit on 1/17 was 12.3/39.6.  Pt. denies weakness, shortness of breath, chest pain, abdominal pain, nausea, coughing up of blood. Pt. did state yesterday she was "unsure" if she noticed blood on her thighs. When asked to elaborate, patient states during daytime yesterday pt did notice some "blood or stool" on her thighs. Patient is unsure if substance was blood. As per discussion with RN today, Rn states he has not noted any red tinge in pt's stool or in urine.     Examined pt at bedside   General - Pt. in NAD. A&O x 4  Neuro - EOMI, PEERLA.   Heart - RRR w/o MRG   Lungs - CTA w/o WRR  Abdomen - flat, symmetrical. BS active and NL in all 4 quadrants. Soft, non tender.   MSK - LE symmetrical without redness, swelling, tenderness    A/P  Anemia    As per discussion with medical attending, discontinued ASA 81mg 1 PO qd and heparin 7500 u SQ BID. Ordered occult. Ordered repeat CBC at 4pm which shows hgb of 10.8. Per discussion with medical attending, will resume heparin SQ and ASA as pt's drop in hgb is likely dilutional and pt is at risk of blood clots due to active covid 19, previous history of DVT and elevated D dimer. Patient has no abdominal pain or signs of bleed and VSS. Will continue to trend cbc and follow up occult. Pulm is following case and recommending CTA, ordered and will inform night PA to follow up result. Spoke with pharmacy at [phone] who recommended dose of heparin 7.5K SQ q 8 hours. Will continue to closely monitor pt.     Vital Signs Last 24 Hrs  T(C): 36.5 (18 Jan 2021 13:29), Max: 36.8 (18 Jan 2021 05:25)  T(F): 97.7 (18 Jan 2021 13:29), Max: 98.2 (18 Jan 2021 05:25)  HR: 83 (18 Jan 2021 13:29) (82 - 86)  BP: 127/78 (18 Jan 2021 13:29) (127/78 - 151/54)  BP(mean): --  RR: 16 (18 Jan 2021 13:29) (16 - 16)  SpO2: 97% (18 Jan 2021 13:29) (95% - 97%)    Slade Leon PA-C  Pager [phone]

## 2021-01-18 NOTE — PHYSICAL THERAPY INITIAL EVALUATION ADULT - DISCHARGE DISPOSITION, PT EVAL
discharge home with continuation of family assist/ home health aide services; no skilled PT needs. Pt appears to be functioning at baseline level (wheelchair bound).

## 2021-01-18 NOTE — PHYSICAL THERAPY INITIAL EVALUATION ADULT - ADDITIONAL COMMENTS
Pt transfers bed to wheelchair with maximum assist x1. Pt has home oxygen (~2-3L).    Pt was left semisupine in bed as found, all lines/tubes intact and call bell within reach, RN aware.

## 2021-01-18 NOTE — PHYSICAL THERAPY INITIAL EVALUATION ADULT - GENERAL OBSERVATIONS, REHAB EVAL
Pt received semisupine in bed, +5L oxygen via nasal cannula, +pulse oximeter, +primafit, in no apparent distress. Pt agreeable to participate in physical therapy evaluation.

## 2021-01-18 NOTE — PHYSICAL THERAPY INITIAL EVALUATION ADULT - PATIENT PROFILE REVIEW, REHAB EVAL
PT orders received: No formal activity order. Consult with ADDI Anderson, patient may participate in PT evaluation./yes PT orders received: No formal activity order. Consult with ADDI MORALES, patient may participate in PT evaluation./yes

## 2021-01-18 NOTE — PHYSICAL THERAPY INITIAL EVALUATION ADULT - RANGE OF MOTION EXAMINATION, REHAB EVAL
Bilateral shoulder flexion 0-90, elbow/wrist WFL/bilateral lower extremity ROM was WFL (within functional limits)

## 2021-01-19 ENCOUNTER — APPOINTMENT (OUTPATIENT)
Dept: GASTROENTEROLOGY | Facility: CLINIC | Age: 71
End: 2021-01-19

## 2021-01-19 LAB
ANION GAP SERPL CALC-SCNC: 11 MMOL/L — SIGNIFICANT CHANGE UP (ref 7–14)
BASOPHILS # BLD AUTO: 0.06 K/UL — SIGNIFICANT CHANGE UP (ref 0–0.2)
BASOPHILS NFR BLD AUTO: 0.5 % — SIGNIFICANT CHANGE UP (ref 0–2)
BUN SERPL-MCNC: 15 MG/DL — SIGNIFICANT CHANGE UP (ref 7–23)
CALCIUM SERPL-MCNC: 9.3 MG/DL — SIGNIFICANT CHANGE UP (ref 8.4–10.5)
CHLORIDE SERPL-SCNC: 106 MMOL/L — SIGNIFICANT CHANGE UP (ref 98–107)
CO2 SERPL-SCNC: 25 MMOL/L — SIGNIFICANT CHANGE UP (ref 22–31)
CREAT SERPL-MCNC: 0.57 MG/DL — SIGNIFICANT CHANGE UP (ref 0.5–1.3)
EOSINOPHIL # BLD AUTO: 0.02 K/UL — SIGNIFICANT CHANGE UP (ref 0–0.5)
EOSINOPHIL NFR BLD AUTO: 0.2 % — SIGNIFICANT CHANGE UP (ref 0–6)
GLUCOSE SERPL-MCNC: 78 MG/DL — SIGNIFICANT CHANGE UP (ref 70–99)
HCT VFR BLD CALC: 36.6 % — SIGNIFICANT CHANGE UP (ref 34.5–45)
HGB BLD-MCNC: 11.2 G/DL — LOW (ref 11.5–15.5)
IANC: 8.07 K/UL — SIGNIFICANT CHANGE UP (ref 1.5–8.5)
IMM GRANULOCYTES NFR BLD AUTO: 0.7 % — SIGNIFICANT CHANGE UP (ref 0–1.5)
LYMPHOCYTES # BLD AUTO: 1.78 K/UL — SIGNIFICANT CHANGE UP (ref 1–3.3)
LYMPHOCYTES # BLD AUTO: 15.9 % — SIGNIFICANT CHANGE UP (ref 13–44)
MAGNESIUM SERPL-MCNC: 1.6 MG/DL — SIGNIFICANT CHANGE UP (ref 1.6–2.6)
MCHC RBC-ENTMCNC: 28 PG — SIGNIFICANT CHANGE UP (ref 27–34)
MCHC RBC-ENTMCNC: 30.6 GM/DL — LOW (ref 32–36)
MCV RBC AUTO: 91.5 FL — SIGNIFICANT CHANGE UP (ref 80–100)
MONOCYTES # BLD AUTO: 1.2 K/UL — HIGH (ref 0–0.9)
MONOCYTES NFR BLD AUTO: 10.7 % — SIGNIFICANT CHANGE UP (ref 2–14)
NEUTROPHILS # BLD AUTO: 8.07 K/UL — HIGH (ref 1.8–7.4)
NEUTROPHILS NFR BLD AUTO: 72 % — SIGNIFICANT CHANGE UP (ref 43–77)
NRBC # BLD: 0 /100 WBCS — SIGNIFICANT CHANGE UP
NRBC # FLD: 0 K/UL — SIGNIFICANT CHANGE UP
NT-PROBNP SERPL-SCNC: 1923 PG/ML — HIGH
PHOSPHATE SERPL-MCNC: 1.6 MG/DL — LOW (ref 2.5–4.5)
PLATELET # BLD AUTO: 212 K/UL — SIGNIFICANT CHANGE UP (ref 150–400)
POTASSIUM SERPL-MCNC: 3.9 MMOL/L — SIGNIFICANT CHANGE UP (ref 3.5–5.3)
POTASSIUM SERPL-SCNC: 3.9 MMOL/L — SIGNIFICANT CHANGE UP (ref 3.5–5.3)
RBC # BLD: 4 M/UL — SIGNIFICANT CHANGE UP (ref 3.8–5.2)
RBC # FLD: 15.3 % — HIGH (ref 10.3–14.5)
SODIUM SERPL-SCNC: 142 MMOL/L — SIGNIFICANT CHANGE UP (ref 135–145)
TSH SERPL-MCNC: 4.6 UIU/ML — HIGH (ref 0.27–4.2)
WBC # BLD: 11.21 K/UL — HIGH (ref 3.8–10.5)
WBC # FLD AUTO: 11.21 K/UL — HIGH (ref 3.8–10.5)

## 2021-01-19 PROCEDURE — 99233 SBSQ HOSP IP/OBS HIGH 50: CPT | Mod: CS

## 2021-01-19 PROCEDURE — 71275 CT ANGIOGRAPHY CHEST: CPT | Mod: 26

## 2021-01-19 RX ORDER — FUROSEMIDE 40 MG
20 TABLET ORAL DAILY
Refills: 0 | Status: DISCONTINUED | OUTPATIENT
Start: 2021-01-19 | End: 2021-01-21

## 2021-01-19 RX ORDER — LOSARTAN POTASSIUM 100 MG/1
50 TABLET, FILM COATED ORAL DAILY
Refills: 0 | Status: DISCONTINUED | OUTPATIENT
Start: 2021-01-19 | End: 2021-01-21

## 2021-01-19 RX ADMIN — Medication 1 TABLET(S): at 12:38

## 2021-01-19 RX ADMIN — MONTELUKAST 10 MILLIGRAM(S): 4 TABLET, CHEWABLE ORAL at 23:26

## 2021-01-19 RX ADMIN — NYSTATIN CREAM 1 APPLICATION(S): 100000 CREAM TOPICAL at 14:49

## 2021-01-19 RX ADMIN — ALBUTEROL 2 PUFF(S): 90 AEROSOL, METERED ORAL at 09:14

## 2021-01-19 RX ADMIN — BUDESONIDE AND FORMOTEROL FUMARATE DIHYDRATE 2 PUFF(S): 160; 4.5 AEROSOL RESPIRATORY (INHALATION) at 23:26

## 2021-01-19 RX ADMIN — Medication 25 MILLIGRAM(S): at 06:34

## 2021-01-19 RX ADMIN — SERTRALINE 100 MILLIGRAM(S): 25 TABLET, FILM COATED ORAL at 12:39

## 2021-01-19 RX ADMIN — Medication 85 MILLIMOLE(S): at 09:59

## 2021-01-19 RX ADMIN — TIOTROPIUM BROMIDE 1 CAPSULE(S): 18 CAPSULE ORAL; RESPIRATORY (INHALATION) at 12:38

## 2021-01-19 RX ADMIN — MAGNESIUM OXIDE 400 MG ORAL TABLET 400 MILLIGRAM(S): 241.3 TABLET ORAL at 09:13

## 2021-01-19 RX ADMIN — CEFEPIME 100 MILLIGRAM(S): 1 INJECTION, POWDER, FOR SOLUTION INTRAMUSCULAR; INTRAVENOUS at 13:32

## 2021-01-19 RX ADMIN — OLANZAPINE 10 MILLIGRAM(S): 15 TABLET, FILM COATED ORAL at 12:40

## 2021-01-19 RX ADMIN — Medication 1000 UNIT(S): at 12:39

## 2021-01-19 RX ADMIN — Medication 25 MILLIGRAM(S): at 12:45

## 2021-01-19 RX ADMIN — Medication 81 MILLIGRAM(S): at 12:38

## 2021-01-19 RX ADMIN — HEPARIN SODIUM 7500 UNIT(S): 5000 INJECTION INTRAVENOUS; SUBCUTANEOUS at 12:39

## 2021-01-19 RX ADMIN — Medication 25 MILLIGRAM(S): at 23:26

## 2021-01-19 RX ADMIN — AZITHROMYCIN 255 MILLIGRAM(S): 500 TABLET, FILM COATED ORAL at 14:04

## 2021-01-19 RX ADMIN — Medication 3 MILLIGRAM(S): at 23:26

## 2021-01-19 RX ADMIN — Medication 20 MILLIGRAM(S): at 15:04

## 2021-01-19 RX ADMIN — GABAPENTIN 100 MILLIGRAM(S): 400 CAPSULE ORAL at 23:26

## 2021-01-19 RX ADMIN — HEPARIN SODIUM 7500 UNIT(S): 5000 INJECTION INTRAVENOUS; SUBCUTANEOUS at 06:35

## 2021-01-19 RX ADMIN — BUDESONIDE AND FORMOTEROL FUMARATE DIHYDRATE 2 PUFF(S): 160; 4.5 AEROSOL RESPIRATORY (INHALATION) at 10:43

## 2021-01-19 RX ADMIN — Medication 5 MILLIGRAM(S): at 06:34

## 2021-01-19 RX ADMIN — ATORVASTATIN CALCIUM 40 MILLIGRAM(S): 80 TABLET, FILM COATED ORAL at 23:25

## 2021-01-19 RX ADMIN — AMLODIPINE BESYLATE 5 MILLIGRAM(S): 2.5 TABLET ORAL at 06:35

## 2021-01-19 RX ADMIN — Medication 5 MILLIGRAM(S): at 18:08

## 2021-01-19 RX ADMIN — HEPARIN SODIUM 7500 UNIT(S): 5000 INJECTION INTRAVENOUS; SUBCUTANEOUS at 23:26

## 2021-01-19 NOTE — PROGRESS NOTE ADULT - ASSESSMENT
_________________________________________________________________________________________  ========>>  M E D I C A L   A T T E N D I N G    F O L L O W  U P  N O T E  <<=========  -----------------------------------------------------------------------------------------------------    - Patient seen and examined by me earlier today.   - In summary,  SHEKHAR VASQUEZ is a 70y year old woman admitted with SOB  - Patient today overall doing ok, comfortable, eating fairly,     ==================>> REVIEW OF SYSTEM <<=================    GEN: no fever, no chills, no pain  RESP: mild SOB, + occasional cough improved , no sputum  CVS: no chest pain, no palpitations, no edema  GI: no abdominal pain, no nausea, no constipation, no diarrhea  : no dysuria, no frequency, no hematuria  Neuro: no headache, no dizziness  Derm : no itching, no rash    ==================>> PHYSICAL EXAM <<=================    GEN: A&O X 3 , NAD , comfortable  HEENT: NCAT, PERRL, MMM, hearing intact  Neck: supple , no JVD appreciated  CVS: S1S2 , regular , No M/R/G appreciated  PULM: CTA B/L,  no W/R/R appreciated  ABD.: soft. non tender, non distended,  obese   Extrem: intact pulses , no edema   PSYCH : normal mood,  a bit anxious       ==================>> MEDICATIONS <<====================    amLODIPine   Tablet 5 milliGRAM(s) Oral daily  aspirin enteric coated 81 milliGRAM(s) Oral daily  atorvastatin 40 milliGRAM(s) Oral at bedtime  azithromycin  IVPB 500 milliGRAM(s) IV Intermittent every 24 hours  budesonide  80 MICROgram(s)/formoterol 4.5 MICROgram(s) Inhaler 2 Puff(s) Inhalation two times a day  cefepime   IVPB 1000 milliGRAM(s) IV Intermittent daily  cholecalciferol 1000 Unit(s) Oral daily  furosemide    Tablet 20 milliGRAM(s) Oral daily  gabapentin 100 milliGRAM(s) Oral at bedtime  heparin   Injectable 7500 Unit(s) SubCutaneous every 8 hours  hydrALAZINE 25 milliGRAM(s) Oral every 8 hours  influenza   Vaccine 0.5 milliLiter(s) IntraMuscular once  magnesium oxide 400 milliGRAM(s) Oral three times a day with meals  melatonin 3 milliGRAM(s) Oral at bedtime  montelukast 10 milliGRAM(s) Oral at bedtime  multivitamin 1 Tablet(s) Oral daily  OLANZapine 10 milliGRAM(s) Oral daily  predniSONE   Tablet 5 milliGRAM(s) Oral two times a day  sertraline 100 milliGRAM(s) Oral daily  tiotropium 18 MICROgram(s) Capsule 1 Capsule(s) Inhalation daily    MEDICATIONS  (PRN):  acetaminophen   Tablet .. 650 milliGRAM(s) Oral every 6 hours PRN Temp greater or equal to 38C (100.4F), Mild Pain (1 - 3), Moderate Pain (4 - 6)  ALBUTerol    90 MICROgram(s) HFA Inhaler 2 Puff(s) Inhalation every 6 hours PRN Shortness of Breath and/or Wheezing  baclofen 10 milliGRAM(s) Oral every 8 hours PRN back pain and spasm  loperamide 2 milliGRAM(s) Oral every 8 hours PRN Diarrhea    ___________  Active diet:  Diet, Regular:   DASH/TLC Sodium & Cholesterol Restricted (DASH)  ___________________    ==================>> VITAL SIGNS <<==================    Height (cm): 162.6  Weight (kg): 100.7  BMI (kg/m2): 38.1  Vital Signs Last 24 HrsT(C): 36.9 (01-19-21 @ 12:42)  T(F): 98.5 (01-19-21 @ 12:42), Max: 98.5 (01-19-21 @ 12:42)  HR: 86 (01-19-21 @ 15:03) (68 - 86)  BP: 126/65 (01-19-21 @ 15:03)  RR: 18 (01-19-21 @ 12:42) (17 - 18)  SpO2: 98% (01-19-21 @ 12:42) (95% - 98%)       ==================>> LAB AND IMAGING <<==================                        11.2   11.21 )-----------( 212      ( 19 Jan 2021 07:14 )             36.6        01-19    142  |  106  |  15  ----------------------------<  78  3.9   |  25  |  0.57    Ca    9.3      19 Jan 2021 07:14  Phos  1.6     01-19  Mg     1.6     01-19    TPro  6.2  /  Alb  2.9<L>  /  TBili  0.5  /  DBili  x   /  AST  23  /  ALT  10  /  AlkPhos  73  01-18    WBC count:   11.21 <<== ,  11.76 <<== ,  14.54 <<== ,  16.10 <<==   Hemoglobin:   11.2 <<==,  10.8 <<==,  10.7 <<==,  12.3 <<==  platelets:  212 <==, 204 <==, 232 <==, 315 <==    Creatinine:  0.57  <<==, 0.84  <<==, 2.98  <<==  Sodium:   142  <==, 136  <==, 137  <==       AST:          23 <== , 24 <==      ALT:        10  <== , 11  <==      AP:        73  <=, 85  <=     Bili:        0.5  <=, 0.6  <=    ^^^ Inflammatory markers :  ^^^  C R P :          49.0 (01-18-21)  <<--, 5.01 (01-06-21)  <<--  P C T :         0.10 (01-18-21) <<--, 0.12 (01-06-21) <<--    Ferritin :         164 (01-18-21) <<--, 162 (01-11-21) <<--, 209 (01-06-21) <<--    D-Dimer :          513 (01-18-21) <<--, 369 (01-11-21) <<--, 573 (01-06-21) <<--    _______________________  C U L T U R E S :    Culture - Blood (collected 17 Jan 2021 16:23)  Source: .Blood Blood-Venous  Preliminary Report (18 Jan 2021 17:02):    No growth to date.    Culture - Blood (collected 17 Jan 2021 16:23)  Source: .Blood Blood-Peripheral  Preliminary Report (18 Jan 2021 17:02):    No growth to date.      COVID-19 IgG Antibody Index: 1.20 (01-06-21 @ 17:52)  SARS-CoV-2: Detected (01-17-21 @ 10:16)  COVID-19 PCR: Detected (01-12-21 @ 22:36)  COVID-19 PCR: Detected (01-10-21 @ 06:53)      < from: CT Angio Chest w/ IV Cont (01.19.21 @ 11:52) >  IMPRESSION:  No pulmonary embolus.  Emphysema.  Basilar predominant mild peripheral opacities likely representing COVID-19 infection.  < end of copied text >    ___________________________________________________________________________________  ===============>>  A S S E S S M E N T   A N D   P L A N <<===============  ------------------------------------------------------------------------------------------    · Assessment	  71 yo female with medical history significant for Anxiety, Asthma, Breast cancer in situ, COPD (chronic obstructive pulmonary disease). DVT (deep venous thrombosis) not presently on A/C, Graves disease, Hyperlipidemia, Hypertension, Hypothyroid, Parathyroidectomy, Kidney cancer, primary, with metastasis from kidney to other site,  LEft sided nephrectomy only- no chemo or RT, Obesity, Sleep apnea comes in  with dehydration, ALDAIR, PNA, hypoxemia     Problem/Plan - 1:  ·  Problem: possible HCAP given recent admission, leukocytosis, bilateral lower lobe infiltrates on Xray     CT scan less consistent as such but pt had elevated WBC and new cough       would finish short, 5 days course of abx and DC   continue O2 and wean as able   monitor vitals, labs   pulm following   NO PE on CTA     Problem/Plan - 2:  ·  Problem: Acute hypoxemic respiratory failure due to COVID-19  CTA to rule out VTE per Pulm   treat underlying causes as above otherwise   Monitor respiratory status and supplement o2  monitor inflammatory markers.    Problem/Plan - 3:  ·  Problem: ALDAIR due to dehydration and pre-renal azothermia >> resolved post Hydration   holding further IVH for now  monitor crea closely post CTA  held diuretics, ACEI / ARB >> resume     mgmt of Dh with imodium PRN     Problem/Plan - 4:  ·  Problem: Graves disease  Has h/o Graves disease, has exophthalmus  Also has scar on neck which she says has parathyroidectomy  Was on prednisone 5mg > will continue.    Problem/Plan - 5:  Problem:  HTN  c/w home meds with parameters  monitor BP.  cardio f/u    Problem/Plan - 6:  ·  Problem: Hypothyroid  Has h/o hypothyroidism   c/w home dose levothyroxine    Problem/Plan - 7:  ·  Problem:  h/o COPD  c/w home meds  pulm f/u. and mgmt     Problem/Plan - 8:  ·  Problem: Prophylactic measure.   on lovenox for dvt prophylaxis.    --------------------------------------------  Case discussed with pt, PA..   Education given on findings and plan of care  ___________________________  H. JANICE Welch.  Pager: [phone]     _________________________________________________________________________________________  ========>>  M E D I C A L   A T T E N D I N G    F O L L O W  U P  N O T E  <<=========  -----------------------------------------------------------------------------------------------------    - Patient seen and examined by me earlier today.   - In summary,  SHEKHAR VASQUEZ is a 70y year old woman admitted with SOB  - Patient today overall doing ok, comfortable, eating fairly,     ==================>> REVIEW OF SYSTEM <<=================    GEN: no fever, no chills, no pain  RESP: mild SOB, + occasional cough improved , no sputum  CVS: no chest pain, no palpitations, no edema  GI: no abdominal pain, no nausea, no constipation, no diarrhea  : no dysuria, no frequency, no hematuria  Neuro: no headache, no dizziness  Derm : no itching, no rash    ==================>> PHYSICAL EXAM <<=================    GEN: A&O X 3 , NAD , comfortable  HEENT: NCAT, PERRL, MMM, hearing intact  Neck: supple , no JVD appreciated  CVS: S1S2 , regular , No M/R/G appreciated  PULM: CTA B/L,  no W/R/R appreciated  ABD.: soft. non tender, non distended,  obese   Extrem: intact pulses , no edema   PSYCH : normal mood,  a bit anxious       ==================>> MEDICATIONS <<====================    amLODIPine   Tablet 5 milliGRAM(s) Oral daily  aspirin enteric coated 81 milliGRAM(s) Oral daily  atorvastatin 40 milliGRAM(s) Oral at bedtime  azithromycin  IVPB 500 milliGRAM(s) IV Intermittent every 24 hours  budesonide  80 MICROgram(s)/formoterol 4.5 MICROgram(s) Inhaler 2 Puff(s) Inhalation two times a day  cefepime   IVPB 1000 milliGRAM(s) IV Intermittent daily  cholecalciferol 1000 Unit(s) Oral daily  furosemide    Tablet 20 milliGRAM(s) Oral daily  gabapentin 100 milliGRAM(s) Oral at bedtime  heparin   Injectable 7500 Unit(s) SubCutaneous every 8 hours  hydrALAZINE 25 milliGRAM(s) Oral every 8 hours  influenza   Vaccine 0.5 milliLiter(s) IntraMuscular once  magnesium oxide 400 milliGRAM(s) Oral three times a day with meals  melatonin 3 milliGRAM(s) Oral at bedtime  montelukast 10 milliGRAM(s) Oral at bedtime  multivitamin 1 Tablet(s) Oral daily  OLANZapine 10 milliGRAM(s) Oral daily  predniSONE   Tablet 5 milliGRAM(s) Oral two times a day  sertraline 100 milliGRAM(s) Oral daily  tiotropium 18 MICROgram(s) Capsule 1 Capsule(s) Inhalation daily    MEDICATIONS  (PRN):  acetaminophen   Tablet .. 650 milliGRAM(s) Oral every 6 hours PRN Temp greater or equal to 38C (100.4F), Mild Pain (1 - 3), Moderate Pain (4 - 6)  ALBUTerol    90 MICROgram(s) HFA Inhaler 2 Puff(s) Inhalation every 6 hours PRN Shortness of Breath and/or Wheezing  baclofen 10 milliGRAM(s) Oral every 8 hours PRN back pain and spasm  loperamide 2 milliGRAM(s) Oral every 8 hours PRN Diarrhea    ___________  Active diet:  Diet, Regular:   DASH/TLC Sodium & Cholesterol Restricted (DASH)  ___________________    ==================>> VITAL SIGNS <<==================    Height (cm): 162.6  Weight (kg): 100.7  BMI (kg/m2): 38.1  Vital Signs Last 24 HrsT(C): 36.9 (01-19-21 @ 12:42)  T(F): 98.5 (01-19-21 @ 12:42), Max: 98.5 (01-19-21 @ 12:42)  HR: 86 (01-19-21 @ 15:03) (68 - 86)  BP: 126/65 (01-19-21 @ 15:03)  RR: 18 (01-19-21 @ 12:42) (17 - 18)  SpO2: 98% (01-19-21 @ 12:42) (95% - 98%)       ==================>> LAB AND IMAGING <<==================                        11.2   11.21 )-----------( 212      ( 19 Jan 2021 07:14 )             36.6        01-19    142  |  106  |  15  ----------------------------<  78  3.9   |  25  |  0.57    Ca    9.3      19 Jan 2021 07:14  Phos  1.6     01-19  Mg     1.6     01-19    TPro  6.2  /  Alb  2.9<L>  /  TBili  0.5  /  DBili  x   /  AST  23  /  ALT  10  /  AlkPhos  73  01-18    WBC count:   11.21 <<== ,  11.76 <<== ,  14.54 <<== ,  16.10 <<==   Hemoglobin:   11.2 <<==,  10.8 <<==,  10.7 <<==,  12.3 <<==  platelets:  212 <==, 204 <==, 232 <==, 315 <==    Creatinine:  0.57  <<==, 0.84  <<==, 2.98  <<==  Sodium:   142  <==, 136  <==, 137  <==       AST:          23 <== , 24 <==      ALT:        10  <== , 11  <==      AP:        73  <=, 85  <=     Bili:        0.5  <=, 0.6  <=    ^^^ Inflammatory markers :  ^^^  C R P :          49.0 (01-18-21)  <<--, 5.01 (01-06-21)  <<--  P C T :         0.10 (01-18-21) <<--, 0.12 (01-06-21) <<--    Ferritin :         164 (01-18-21) <<--, 162 (01-11-21) <<--, 209 (01-06-21) <<--    D-Dimer :          513 (01-18-21) <<--, 369 (01-11-21) <<--, 573 (01-06-21) <<--    _______________________  C U L T U R E S :    Culture - Blood (collected 17 Jan 2021 16:23)  Source: .Blood Blood-Venous  Preliminary Report (18 Jan 2021 17:02):    No growth to date.    Culture - Blood (collected 17 Jan 2021 16:23)  Source: .Blood Blood-Peripheral  Preliminary Report (18 Jan 2021 17:02):    No growth to date.      COVID-19 IgG Antibody Index: 1.20 (01-06-21 @ 17:52)  SARS-CoV-2: Detected (01-17-21 @ 10:16)  COVID-19 PCR: Detected (01-12-21 @ 22:36)  COVID-19 PCR: Detected (01-10-21 @ 06:53)      < from: CT Angio Chest w/ IV Cont (01.19.21 @ 11:52) >  IMPRESSION:  No pulmonary embolus.  Emphysema.  Basilar predominant mild peripheral opacities likely representing COVID-19 infection.  < end of copied text >    ___________________________________________________________________________________  ===============>>  A S S E S S M E N T   A N D   P L A N <<===============  ------------------------------------------------------------------------------------------    · Assessment	  71 yo female with medical history significant for Anxiety, Asthma, Breast cancer in situ, COPD (chronic obstructive pulmonary disease). DVT (deep venous thrombosis) not presently on A/C, Graves disease, Hyperlipidemia, Hypertension, Hypothyroid, Parathyroidectomy, Kidney cancer, primary, with metastasis from kidney to other site,  LEft sided nephrectomy only- no chemo or RT, Obesity, Sleep apnea comes in  with dehydration, ALDAIR, PNA, hypoxemia     Problem/Plan - 1:  ·  Problem: possible HCAP given recent admission, leukocytosis, bilateral lower lobe infiltrates on Xray     CT scan less consistent as such but pt had elevated WBC and new cough       would finish short, 5 days course of abx and DC   continue O2 and wean as able   monitor vitals, labs   pulm following   NO PE on CTA     Problem/Plan - 2:  ·  Problem: Acute hypoxemic respiratory failure due to COVID-19  CTA to rule out VTE per Pulm   treat underlying causes as above otherwise   Monitor respiratory status and supplement o2  monitor inflammatory markers.    Problem/Plan - 3:  ·  Problem: ALDAIR due to dehydration and pre-renal azothermia >> resolved post Hydration   holding further IVH for now  monitor crea closely post CTA  held diuretics, ACEI / ARB >> resume     mgmt of Dh with imodium PRN     Problem/Plan - 4:  ·  Problem: Graves disease  Has h/o Graves disease, has exophthalmus  Also has scar on neck which she says has parathyroidectomy  Was on prednisone 5mg > will continue.    Problem/Plan - 5:  Problem:  HTN  c/w home meds with parameters  monitor BP.  cardio f/u    Problem/Plan - 6:  ·  Problem: Hypothyroid  Has h/o hypothyroidism   c/w home dose levothyroxine    repeat full TFTs     Problem/Plan - 7:  ·  Problem:  h/o COPD  c/w home meds  pulm f/u. and mgmt     Problem/Plan - 8:  ·  Problem: Prophylactic measure.   on lovenox for dvt prophylaxis.    --------------------------------------------  Case discussed with pt, PA..   Education given on findings and plan of care  ___________________________  H. JANICE Welch.  Pager: [phone]     _________________________________________________________________________________________  ========>>  M E D I C A L   A T T E N D I N G    F O L L O W  U P  N O T E  <<=========  -----------------------------------------------------------------------------------------------------    - Patient seen and examined by me earlier today.   - In summary,  SHEKHAR VASQUEZ is a 70y year old woman admitted with SOB  - Patient today overall doing ok, comfortable, eating fairly,     ==================>> REVIEW OF SYSTEM <<=================    GEN: no fever, no chills, no pain  RESP: mild SOB, + occasional cough improved , no sputum  CVS: no chest pain, no palpitations, no edema  GI: no abdominal pain, no nausea, no constipation, no diarrhea  : no dysuria, no frequency, no hematuria  Neuro: no headache, no dizziness  Derm : no itching, no rash    ==================>> PHYSICAL EXAM <<=================    GEN: A&O X 3 , NAD , comfortable  HEENT: NCAT, PERRL, MMM, hearing intact  Neck: supple , no JVD appreciated  CVS: S1S2 , regular , No M/R/G appreciated  PULM: CTA B/L,  no W/R/R appreciated  ABD.: soft. non tender, non distended,  obese   Extrem: intact pulses , no edema   PSYCH : normal mood,  a bit anxious       ==================>> MEDICATIONS <<====================    amLODIPine   Tablet 5 milliGRAM(s) Oral daily  aspirin enteric coated 81 milliGRAM(s) Oral daily  atorvastatin 40 milliGRAM(s) Oral at bedtime  azithromycin  IVPB 500 milliGRAM(s) IV Intermittent every 24 hours  budesonide  80 MICROgram(s)/formoterol 4.5 MICROgram(s) Inhaler 2 Puff(s) Inhalation two times a day  cefepime   IVPB 1000 milliGRAM(s) IV Intermittent daily  cholecalciferol 1000 Unit(s) Oral daily  furosemide    Tablet 20 milliGRAM(s) Oral daily  gabapentin 100 milliGRAM(s) Oral at bedtime  heparin   Injectable 7500 Unit(s) SubCutaneous every 8 hours  hydrALAZINE 25 milliGRAM(s) Oral every 8 hours  influenza   Vaccine 0.5 milliLiter(s) IntraMuscular once  magnesium oxide 400 milliGRAM(s) Oral three times a day with meals  melatonin 3 milliGRAM(s) Oral at bedtime  montelukast 10 milliGRAM(s) Oral at bedtime  multivitamin 1 Tablet(s) Oral daily  OLANZapine 10 milliGRAM(s) Oral daily  predniSONE   Tablet 5 milliGRAM(s) Oral two times a day  sertraline 100 milliGRAM(s) Oral daily  tiotropium 18 MICROgram(s) Capsule 1 Capsule(s) Inhalation daily    MEDICATIONS  (PRN):  acetaminophen   Tablet .. 650 milliGRAM(s) Oral every 6 hours PRN Temp greater or equal to 38C (100.4F), Mild Pain (1 - 3), Moderate Pain (4 - 6)  ALBUTerol    90 MICROgram(s) HFA Inhaler 2 Puff(s) Inhalation every 6 hours PRN Shortness of Breath and/or Wheezing  baclofen 10 milliGRAM(s) Oral every 8 hours PRN back pain and spasm  loperamide 2 milliGRAM(s) Oral every 8 hours PRN Diarrhea    ___________  Active diet:  Diet, Regular:   DASH/TLC Sodium & Cholesterol Restricted (DASH)  ___________________    ==================>> VITAL SIGNS <<==================    Height (cm): 162.6  Weight (kg): 100.7  BMI (kg/m2): 38.1  Vital Signs Last 24 HrsT(C): 36.9 (01-19-21 @ 12:42)  T(F): 98.5 (01-19-21 @ 12:42), Max: 98.5 (01-19-21 @ 12:42)  HR: 86 (01-19-21 @ 15:03) (68 - 86)  BP: 126/65 (01-19-21 @ 15:03)  RR: 18 (01-19-21 @ 12:42) (17 - 18)  SpO2: 98% (01-19-21 @ 12:42) (95% - 98%)       ==================>> LAB AND IMAGING <<==================                        11.2   11.21 )-----------( 212      ( 19 Jan 2021 07:14 )             36.6        01-19    142  |  106  |  15  ----------------------------<  78  3.9   |  25  |  0.57    Ca    9.3      19 Jan 2021 07:14  Phos  1.6     01-19  Mg     1.6     01-19    TPro  6.2  /  Alb  2.9<L>  /  TBili  0.5  /  DBili  x   /  AST  23  /  ALT  10  /  AlkPhos  73  01-18    WBC count:   11.21 <<== ,  11.76 <<== ,  14.54 <<== ,  16.10 <<==   Hemoglobin:   11.2 <<==,  10.8 <<==,  10.7 <<==,  12.3 <<==  platelets:  212 <==, 204 <==, 232 <==, 315 <==    Creatinine:  0.57  <<==, 0.84  <<==, 2.98  <<==  Sodium:   142  <==, 136  <==, 137  <==       AST:          23 <== , 24 <==      ALT:        10  <== , 11  <==      AP:        73  <=, 85  <=     Bili:        0.5  <=, 0.6  <=    ^^^ Inflammatory markers :  ^^^  C R P :          49.0 (01-18-21)  <<--, 5.01 (01-06-21)  <<--  P C T :         0.10 (01-18-21) <<--, 0.12 (01-06-21) <<--    Ferritin :         164 (01-18-21) <<--, 162 (01-11-21) <<--, 209 (01-06-21) <<--    D-Dimer :          513 (01-18-21) <<--, 369 (01-11-21) <<--, 573 (01-06-21) <<--    _______________________  C U L T U R E S :    Culture - Blood (collected 17 Jan 2021 16:23)  Source: .Blood Blood-Venous  Preliminary Report (18 Jan 2021 17:02):    No growth to date.    Culture - Blood (collected 17 Jan 2021 16:23)  Source: .Blood Blood-Peripheral  Preliminary Report (18 Jan 2021 17:02):    No growth to date.      COVID-19 IgG Antibody Index: 1.20 (01-06-21 @ 17:52)  SARS-CoV-2: Detected (01-17-21 @ 10:16)  COVID-19 PCR: Detected (01-12-21 @ 22:36)  COVID-19 PCR: Detected (01-10-21 @ 06:53)      < from: CT Angio Chest w/ IV Cont (01.19.21 @ 11:52) >  IMPRESSION:  No pulmonary embolus.  Emphysema.  Basilar predominant mild peripheral opacities likely representing COVID-19 infection.  < end of copied text >    ___________________________________________________________________________________  ===============>>  A S S E S S M E N T   A N D   P L A N <<===============  ------------------------------------------------------------------------------------------    · Assessment	  71 yo female with medical history significant for Anxiety, Asthma, Breast cancer in situ, COPD (chronic obstructive pulmonary disease). DVT (deep venous thrombosis) not presently on A/C, Graves disease, Hyperlipidemia, Hypertension, Hypothyroid, Parathyroidectomy, Kidney cancer, primary, with metastasis from kidney to other site,  LEft sided nephrectomy only- no chemo or RT, Obesity, Sleep apnea comes in  with dehydration, ALDAIR, PNA, hypoxemia     Problem/Plan - 1:  ·  Problem: possible HCAP given recent admission, leukocytosis, bilateral lower lobe infiltrates on Xray     CT scan less consistent as such but pt had elevated WBC and new cough       would finish short, 5 days course of abx and DC   continue O2 and wean as able   monitor vitals, labs   pulm following   NO PE on CTA     Problem/Plan - 2:  ·  Problem: Acute hypoxemic respiratory failure due to COVID-19  CTA to rule out VTE per Pulm   treat underlying causes as above otherwise   Monitor respiratory status and supplement o2  monitor inflammatory markers.    Problem/Plan - 3:  ·  Problem: ALDAIR due to dehydration and pre-renal azothermia >> resolved post Hydration   holding further IVH for now  monitor crea closely post CTA  held diuretics, ACEI / ARB >> resume     mgmt of Dh with imodium PRN     Problem/Plan - 4:  ·  Problem: Graves disease  Has h/o Graves disease, has exophthalmus  Also has scar on neck which she says has parathyroidectomy  Was on prednisone 5mg > will continue.    Problem/Plan - 5:  Problem:  HTN  c/w home meds with parameters  monitor BP.  cardio f/u    Problem/Plan - 6:  ·  Problem: Hypothyroid  Has h/o hypothyroidism   c/w home dose levothyroxine    Thyroid Stimulating Hormone, Serum: 4.60 uIU/mL (01.19.21 @ 07:14)  repeat TSH in AM > ordered     Problem/Plan - 7:  ·  Problem:  h/o COPD  c/w home meds  pulm f/u. and mgmt     Problem/Plan - 8:  ·  Problem: Prophylactic measure.   on lovenox for dvt prophylaxis.    --------------------------------------------  Case discussed with pt, PA..   Education given on findings and plan of care  ___________________________  H. JANICE Welch.  Pager: [phone]

## 2021-01-20 ENCOUNTER — TRANSCRIPTION ENCOUNTER (OUTPATIENT)
Age: 71
End: 2021-01-20

## 2021-01-20 LAB
ANION GAP SERPL CALC-SCNC: 10 MMOL/L — SIGNIFICANT CHANGE UP (ref 7–14)
BASOPHILS # BLD AUTO: 0.04 K/UL — SIGNIFICANT CHANGE UP (ref 0–0.2)
BASOPHILS NFR BLD AUTO: 0.3 % — SIGNIFICANT CHANGE UP (ref 0–2)
BUN SERPL-MCNC: 9 MG/DL — SIGNIFICANT CHANGE UP (ref 7–23)
CALCIUM SERPL-MCNC: 9.2 MG/DL — SIGNIFICANT CHANGE UP (ref 8.4–10.5)
CHLORIDE SERPL-SCNC: 105 MMOL/L — SIGNIFICANT CHANGE UP (ref 98–107)
CO2 SERPL-SCNC: 28 MMOL/L — SIGNIFICANT CHANGE UP (ref 22–31)
CREAT SERPL-MCNC: 0.57 MG/DL — SIGNIFICANT CHANGE UP (ref 0.5–1.3)
CRP SERPL-MCNC: 62.7 MG/L — HIGH
D DIMER BLD IA.RAPID-MCNC: 422 NG/ML DDU — HIGH
EOSINOPHIL # BLD AUTO: 0.03 K/UL — SIGNIFICANT CHANGE UP (ref 0–0.5)
EOSINOPHIL NFR BLD AUTO: 0.2 % — SIGNIFICANT CHANGE UP (ref 0–6)
FERRITIN SERPL-MCNC: 130 NG/ML — SIGNIFICANT CHANGE UP (ref 15–150)
GLUCOSE SERPL-MCNC: 97 MG/DL — SIGNIFICANT CHANGE UP (ref 70–99)
HCT VFR BLD CALC: 35 % — SIGNIFICANT CHANGE UP (ref 34.5–45)
HGB BLD-MCNC: 10.8 G/DL — LOW (ref 11.5–15.5)
IANC: 10.14 K/UL — HIGH (ref 1.5–8.5)
IMM GRANULOCYTES NFR BLD AUTO: 0.7 % — SIGNIFICANT CHANGE UP (ref 0–1.5)
LDH SERPL L TO P-CCNC: 220 U/L — SIGNIFICANT CHANGE UP (ref 135–225)
LYMPHOCYTES # BLD AUTO: 1.37 K/UL — SIGNIFICANT CHANGE UP (ref 1–3.3)
LYMPHOCYTES # BLD AUTO: 10.8 % — LOW (ref 13–44)
MAGNESIUM SERPL-MCNC: 1.4 MG/DL — LOW (ref 1.6–2.6)
MCHC RBC-ENTMCNC: 27.7 PG — SIGNIFICANT CHANGE UP (ref 27–34)
MCHC RBC-ENTMCNC: 30.9 GM/DL — LOW (ref 32–36)
MCV RBC AUTO: 89.7 FL — SIGNIFICANT CHANGE UP (ref 80–100)
MONOCYTES # BLD AUTO: 1.06 K/UL — HIGH (ref 0–0.9)
MONOCYTES NFR BLD AUTO: 8.3 % — SIGNIFICANT CHANGE UP (ref 2–14)
NEUTROPHILS # BLD AUTO: 10.14 K/UL — HIGH (ref 1.8–7.4)
NEUTROPHILS NFR BLD AUTO: 79.7 % — HIGH (ref 43–77)
NRBC # BLD: 0 /100 WBCS — SIGNIFICANT CHANGE UP
NRBC # FLD: 0 K/UL — SIGNIFICANT CHANGE UP
PHOSPHATE SERPL-MCNC: 2.3 MG/DL — LOW (ref 2.5–4.5)
PLATELET # BLD AUTO: 195 K/UL — SIGNIFICANT CHANGE UP (ref 150–400)
POTASSIUM SERPL-MCNC: 3.6 MMOL/L — SIGNIFICANT CHANGE UP (ref 3.5–5.3)
POTASSIUM SERPL-SCNC: 3.6 MMOL/L — SIGNIFICANT CHANGE UP (ref 3.5–5.3)
PROCALCITONIN SERPL-MCNC: 0.07 NG/ML — SIGNIFICANT CHANGE UP (ref 0.02–0.1)
RBC # BLD: 3.9 M/UL — SIGNIFICANT CHANGE UP (ref 3.8–5.2)
RBC # FLD: 15.5 % — HIGH (ref 10.3–14.5)
SODIUM SERPL-SCNC: 143 MMOL/L — SIGNIFICANT CHANGE UP (ref 135–145)
TSH SERPL-MCNC: 4.51 UIU/ML — HIGH (ref 0.27–4.2)
WBC # BLD: 12.73 K/UL — HIGH (ref 3.8–10.5)
WBC # FLD AUTO: 12.73 K/UL — HIGH (ref 3.8–10.5)

## 2021-01-20 PROCEDURE — 99233 SBSQ HOSP IP/OBS HIGH 50: CPT | Mod: CS

## 2021-01-20 RX ORDER — MAGNESIUM SULFATE 500 MG/ML
2 VIAL (ML) INJECTION ONCE
Refills: 0 | Status: COMPLETED | OUTPATIENT
Start: 2021-01-20 | End: 2021-01-20

## 2021-01-20 RX ADMIN — Medication 50 GRAM(S): at 15:19

## 2021-01-20 RX ADMIN — OLANZAPINE 10 MILLIGRAM(S): 15 TABLET, FILM COATED ORAL at 12:06

## 2021-01-20 RX ADMIN — SERTRALINE 100 MILLIGRAM(S): 25 TABLET, FILM COATED ORAL at 12:06

## 2021-01-20 RX ADMIN — Medication 25 MILLIGRAM(S): at 14:11

## 2021-01-20 RX ADMIN — Medication 1000 UNIT(S): at 12:06

## 2021-01-20 RX ADMIN — TIOTROPIUM BROMIDE 1 CAPSULE(S): 18 CAPSULE ORAL; RESPIRATORY (INHALATION) at 09:53

## 2021-01-20 RX ADMIN — ATORVASTATIN CALCIUM 40 MILLIGRAM(S): 80 TABLET, FILM COATED ORAL at 21:08

## 2021-01-20 RX ADMIN — BUDESONIDE AND FORMOTEROL FUMARATE DIHYDRATE 2 PUFF(S): 160; 4.5 AEROSOL RESPIRATORY (INHALATION) at 09:53

## 2021-01-20 RX ADMIN — Medication 1 TABLET(S): at 12:05

## 2021-01-20 RX ADMIN — Medication 5 MILLIGRAM(S): at 16:35

## 2021-01-20 RX ADMIN — HEPARIN SODIUM 7500 UNIT(S): 5000 INJECTION INTRAVENOUS; SUBCUTANEOUS at 14:11

## 2021-01-20 RX ADMIN — Medication 20 MILLIGRAM(S): at 05:22

## 2021-01-20 RX ADMIN — LOSARTAN POTASSIUM 50 MILLIGRAM(S): 100 TABLET, FILM COATED ORAL at 05:22

## 2021-01-20 RX ADMIN — AZITHROMYCIN 255 MILLIGRAM(S): 500 TABLET, FILM COATED ORAL at 14:11

## 2021-01-20 RX ADMIN — GABAPENTIN 100 MILLIGRAM(S): 400 CAPSULE ORAL at 21:08

## 2021-01-20 RX ADMIN — Medication 81 MILLIGRAM(S): at 12:06

## 2021-01-20 RX ADMIN — HEPARIN SODIUM 7500 UNIT(S): 5000 INJECTION INTRAVENOUS; SUBCUTANEOUS at 05:22

## 2021-01-20 RX ADMIN — Medication 25 MILLIGRAM(S): at 21:08

## 2021-01-20 RX ADMIN — Medication 5 MILLIGRAM(S): at 05:22

## 2021-01-20 RX ADMIN — BUDESONIDE AND FORMOTEROL FUMARATE DIHYDRATE 2 PUFF(S): 160; 4.5 AEROSOL RESPIRATORY (INHALATION) at 21:09

## 2021-01-20 RX ADMIN — AMLODIPINE BESYLATE 5 MILLIGRAM(S): 2.5 TABLET ORAL at 05:22

## 2021-01-20 RX ADMIN — MONTELUKAST 10 MILLIGRAM(S): 4 TABLET, CHEWABLE ORAL at 21:08

## 2021-01-20 RX ADMIN — Medication 25 MILLIGRAM(S): at 05:22

## 2021-01-20 RX ADMIN — CEFEPIME 100 MILLIGRAM(S): 1 INJECTION, POWDER, FOR SOLUTION INTRAMUSCULAR; INTRAVENOUS at 12:05

## 2021-01-20 RX ADMIN — Medication 3 MILLIGRAM(S): at 21:08

## 2021-01-20 RX ADMIN — HEPARIN SODIUM 7500 UNIT(S): 5000 INJECTION INTRAVENOUS; SUBCUTANEOUS at 21:08

## 2021-01-20 NOTE — PROGRESS NOTE ADULT - ASSESSMENT
_________________________________________________________________________________________  ========>>  M E D I C A L   A T T E N D I N G    F O L L O W  U P  N O T E  <<=========  -----------------------------------------------------------------------------------------------------    - Patient seen and examined by me earlier today.   - In summary,  SHEKHAR VASQUEZ is a 70y year old woman admitted with SOB  - Patient today overall doing ok, comfortable, eating fairly,     ==================>> REVIEW OF SYSTEM <<=================    GEN: no fever, no chills, no pain  RESP: mild SOB, + occasional cough improved , no sputum  CVS: no chest pain, no palpitations, no edema  GI: no abdominal pain, no nausea, no constipation, no diarrhea  : no dysuria, no frequency, no hematuria  Neuro: no headache, no dizziness  Derm : no itching, no rash    ==================>> PHYSICAL EXAM <<=================    GEN: A&O X 3 , NAD , comfortable  HEENT: NCAT, PERRL, MMM, hearing intact  Neck: supple , no JVD appreciated  CVS: S1S2 , regular , No M/R/G appreciated  PULM: CTA B/L,  no W/R/R appreciated  ABD.: soft. non tender, non distended,  obese   Extrem: intact pulses , no edema   PSYCH : normal mood,  a bit anxious       ==================>> MEDICATIONS <<====================    amLODIPine   Tablet 5 milliGRAM(s) Oral daily  aspirin enteric coated 81 milliGRAM(s) Oral daily  atorvastatin 40 milliGRAM(s) Oral at bedtime  azithromycin  IVPB 500 milliGRAM(s) IV Intermittent every 24 hours  budesonide  80 MICROgram(s)/formoterol 4.5 MICROgram(s) Inhaler 2 Puff(s) Inhalation two times a day  cefepime   IVPB 1000 milliGRAM(s) IV Intermittent daily  cholecalciferol 1000 Unit(s) Oral daily  furosemide    Tablet 20 milliGRAM(s) Oral daily  gabapentin 100 milliGRAM(s) Oral at bedtime  heparin   Injectable 7500 Unit(s) SubCutaneous every 8 hours  hydrALAZINE 25 milliGRAM(s) Oral every 8 hours  influenza   Vaccine 0.5 milliLiter(s) IntraMuscular once  losartan 50 milliGRAM(s) Oral daily  magnesium oxide 400 milliGRAM(s) Oral three times a day with meals  magnesium sulfate  IVPB 2 Gram(s) IV Intermittent once  melatonin 3 milliGRAM(s) Oral at bedtime  montelukast 10 milliGRAM(s) Oral at bedtime  multivitamin 1 Tablet(s) Oral daily  OLANZapine 10 milliGRAM(s) Oral daily  predniSONE   Tablet 5 milliGRAM(s) Oral two times a day  sertraline 100 milliGRAM(s) Oral daily  tiotropium 18 MICROgram(s) Capsule 1 Capsule(s) Inhalation daily    MEDICATIONS  (PRN):  acetaminophen   Tablet .. 650 milliGRAM(s) Oral every 6 hours PRN Temp greater or equal to 38C (100.4F), Mild Pain (1 - 3), Moderate Pain (4 - 6)  ALBUTerol    90 MICROgram(s) HFA Inhaler 2 Puff(s) Inhalation every 6 hours PRN Shortness of Breath and/or Wheezing  baclofen 10 milliGRAM(s) Oral every 8 hours PRN back pain and spasm  loperamide 2 milliGRAM(s) Oral every 8 hours PRN Diarrhea    ___________  Active diet:  Diet, Regular:   DASH/TLC Sodium & Cholesterol Restricted (DASH)  ___________________    ==================>> VITAL SIGNS <<==================   Height (cm): 162.6  Weight (kg): 100.7  BMI (kg/m2): 38.1  Vital Signs Last 24 HrsT(C): 36.6 (01-20-21 @ 05:00)  T(F): 97.9 (01-20-21 @ 05:00), Max: 98.6 (01-19-21 @ 21:00)  HR: 80 (01-20-21 @ 05:00) (80 - 86)  BP: 137/68 (01-20-21 @ 05:00)  RR: 18 (01-20-21 @ 05:00) (18 - 18)  SpO2: 98% (01-20-21 @ 05:00) (97% - 98%)       ==================>> LAB AND IMAGING <<==================                        10.8   12.73 )-----------( 195      ( 20 Jan 2021 07:30 )             35.0        01-20    143  |  105  |  9   ----------------------------<  97  3.6   |  28  |  0.57    Ca    9.2      20 Jan 2021 07:30  Phos  2.3     01-20  Mg     1.4     01-20    ^^^ Inflammatory markers :  ^^^    C R P :          62.7 (01-20-21)  <<--, 49.0 (01-18-21)  <<--, 5.01 (01-06-21)  <<--  P C T :         0.07 (01-20-21) <<--, 0.10 (01-18-21) <<--, 0.12 (01-06-21) <<--    Ferritin :         130 (01-20-21) <<--, 164 (01-18-21) <<--, 162 (01-11-21) <<--, 209 (01-06-21) <<--    D-Dimer :          422 (01-20-21) <<--, 513 (01-18-21) <<--, 369 (01-11-21) <<--, 573 (01-06-21) <<--      < from: CT Angio Chest w/ IV Cont (01.19.21 @ 11:52) >  IMPRESSION:  No pulmonary embolus.  Emphysema.  Basilar predominant mild peripheral opacities likely representing COVID-19 infection.  < end of copied text >    ___________________________________________________________________________________  ===============>>  A S S E S S M E N T   A N D   P L A N <<===============  ------------------------------------------------------------------------------------------    · Assessment	  71 yo female with medical history significant for Anxiety, Asthma, Breast cancer in situ, COPD (chronic obstructive pulmonary disease). DVT (deep venous thrombosis) not presently on A/C, Graves disease, Hyperlipidemia, Hypertension, Hypothyroid, Parathyroidectomy, Kidney cancer, primary, with metastasis from kidney to other site,  LEft sided nephrectomy only- no chemo or RT, Obesity, Sleep apnea comes in  with dehydration, ALDAIR, PNA, hypoxemia     Problem/Plan - 1:  ·  Problem: possible HCAP given recent admission, leukocytosis, bilateral lower lobe infiltrates on Xray     CT scan less consistent as such but pt had elevated WBC and new cough       would finish short, 5 days course of abx and DC   continue O2 and wean as able >> pt already HAS O2 at home   monitor vitals, labs   pulm following   NO PE on CTA     Problem/Plan - 2:  ·  Problem: Acute hypoxemic respiratory failure due to COVID-19  CTA to rule out VTE per Pulm   treat underlying causes as above otherwise   Monitor respiratory status and supplement o2  monitor inflammatory markers.    Problem/Plan - 3:  ·  Problem: ALDAIR due to dehydration and pre-renal azothermia >> resolved post Hydration   holding further IVH for now  monitor crea closely post CTA  held diuretics, ACEI / ARB >> resume     mgmt of chronic  Dh with imodium PRN     Problem/Plan - 4:  ·  Problem: Graves disease  Has h/o Graves disease, has exophthalmus  Also has scar on neck which she says has parathyroidectomy  Was on prednisone 5mg > will continue.    Problem/Plan - 5:  Problem:  HTN  c/w home meds with parameters  monitor BP.  cardio f/u    Problem/Plan - 6:  ·  Problem: Hypothyroid  Has h/o hypothyroidism   c/w home dose levothyroxine    Thyroid Stimulating Hormone, Serum: 4.60 uIU/mL (01.19.21 @ 07:14)  Thyroid Stimulating Hormone, Serum: 4.51 uIU/mL (01.20.21 @ 07:30)  >>follow up as OP and repeat in 4 weeks     Problem/Plan - 7:  ·  Problem:  h/o COPD  c/w home meds  pulm f/u. and mgmt     Problem/Plan - 8:  ·  Problem: Prophylactic measure.   on lovenox for dvt prophylaxis.    --------------------------------------------  Case discussed with pt  Education given on findings and plan of care  ___________________________  HRenetta Welch D.O.  Pager: [phone]

## 2021-01-20 NOTE — DISCHARGE NOTE PROVIDER - NSDCFUSCHEDAPPT_GEN_ALL_CORE_FT
SHEKHAR VASQUEZ ; 01/22/2021 ; [facility]  SHEKHAR VASQUEZ S ; 02/19/2021 ; NP Rad [facility] [address]  SHEKHAR VASQUEZ S ; 02/19/2021 ; [facility] Rad  [address] [facility] SHEKHAR VASQUEZ S ; 02/19/2021 ; NPP Rad [facility] [address]  SHEKHAR VASQUEZ ; 02/19/2021 ; NPP Rad  [address]

## 2021-01-20 NOTE — DISCHARGE NOTE PROVIDER - NSDCCAREPROVSEEN_GEN_ALL_CORE_FT
Kamila Welch Kamila Luciano [facility] Pranav Northhvi  User ADM  Ordering Physician  Juan Lund  Team [facility] Medicine ACP

## 2021-01-20 NOTE — DISCHARGE NOTE PROVIDER - HOSPITAL COURSE
69 yo female with medical history significant for Anxiety, Asthma, Breast cancer in situ, COPD (chronic obstructive pulmonary disease). DVT (deep venous thrombosis) not presently on A/C, Graves disease, Hyperlipidemia, Hypertension, Hypothyroid, Parathyroidectomy, Kidney cancer, primary, with metastasis from kidney to other site,  Left sided nephrectomy only- no chemo or RT, Obesity, Sleep apnea comes in  with dehydration, ALDAIR, PNA, hypoxemia.     Problem/Plan - 1:  ·  Problem: possible HCAP given recent admission, leukocytosis, bilateral lower lobe infiltrates on Xray     CT scan less consistent as such but pt had elevated WBC and new cough       would finish short, 5 days course of abx and DC   continue O2 and wean as able >> pt already HAS O2 at home   monitor vitals, labs   pulm following   NO PE on CTA      Problem/Plan - 2:  ·  Problem: Acute hypoxemic respiratory failure due to COVID-19  CTA to rule out VTE per Pulm   treat underlying causes as above otherwise   Monitor respiratory status and supplement o2  monitor inflammatory markers.     Problem/Plan - 3:  ·  Problem: ALDAIR due to dehydration and pre-renal azothermia >> resolved post Hydration   holding further IVH for now  monitor crea closely post CTA  held diuretics, ACEI / ARB >> resume     mgmt of chronic  Dh with imodium PRN      Problem/Plan - 4:  ·  Problem: Graves disease  Has h/o Graves disease, has exophthalmus  Also has scar on neck which she says has parathyroidectomy  Was on prednisone 5mg > will continue.     Problem/Plan - 5:  Problem:  HTN  c/w home meds with parameters  monitor BP.  cardio f/u     Problem/Plan - 6:  ·  Problem: Hypothyroid  Has h/o hypothyroidism   c/w home dose levothyroxine    Thyroid Stimulating Hormone, Serum: 4.60 uIU/mL (01.19.21 @ 07:14)  Thyroid Stimulating Hormone, Serum: 4.51 uIU/mL (01.20.21 @ 07:30)  >>follow up as OP and repeat in 4 weeks      Problem/Plan - 7:  ·  Problem:  h/o COPD  c/w home meds  pulm f/u. and mgmt      Problem/Plan - 8:  ·  Problem: Prophylactic measure.   on lovenox for dvt prophylaxis.    Patient seen and evaluated. Reviewed discharge medications with patient and attending. All new medications requiring new prescriptions were sent to the pharmacy of patient's choice. Reviewed need for prescription for previous home medications and new prescriptions sent if requested. Medically cleared/stable for discharge as per . _______ on ________ with appropriate follow up. Patient understands and agrees with plan of care. 71 yo female with medical history significant for Anxiety, Asthma, Breast cancer in situ, COPD (chronic obstructive pulmonary disease). DVT (deep venous thrombosis) not presently on A/C, Graves disease, Hyperlipidemia, Hypertension, Hypothyroid, Parathyroidectomy, Kidney cancer, primary, with metastasis from kidney to other site,  Left sided nephrectomy only- no chemo or RT, Obesity, Sleep apnea comes in  with dehydration, ALDAIR, PNA, hypoxemia.    Possible HCAP given recent admission  - leukocytosis, bilateral lower lobe infiltrates on Xray, CT scan less consistent as such but pt had elevated WBC and new cough   - NO PE on CTA   - Completed 5 day course of Cefepime IV + Azithromycin IV  - Patient O2 stable on 3-4LNC, pt already HAS O2 at home   - Pulm following     Acute hypoxemic respiratory failure due to COVID-19  - NO PE on CTA per Pulm     ALDAIR due to dehydration and pre-renal azothermia  - resolved post Hydration   - holding further IVH for now  - held diuretics, ACEI / ARB >> resume     mgmt of chronic  Dh   - imodium PRN     Graves disease  - Has h/o Graves disease, has exophthalmus  - Also has scar on neck which she says has parathyroidectomy  - Was on prednisone 5mg > will continue.    HTN  - c/w home meds with parameters  - cardio f/u    Hypothyroid  - c/w home dose levothyroxine  - On 1/19 - TSH: 4.60 uIU/mL   - On 1/20 - TSH: 4.51 uIU/mL   - follow up as OP and repeat in 4 weeks     h/o COPD  - c/w home meds  - pulm f/u. and mgmt     Prophylactic measure.   - on lovenox for dvt prophylaxis  - D/C on Xarelto 10 mg QD x 30 days (per [facility] pharmacy no copay)    Patient seen and evaluated. Reviewed discharge medications with patient and attending. All new medications requiring new prescriptions were sent to the pharmacy of patient's choice. Reviewed need for prescription for previous home medications and new prescriptions sent if requested. Medically cleared/stable for discharge as per Dr. Welch on 1/21/21 with appropriate follow up. Patient understands and agrees with plan of care.

## 2021-01-20 NOTE — DISCHARGE NOTE PROVIDER - NSDCFUADDAPPT_GEN_ALL_CORE_FT
Follow up with your primary care doctor within one week of discharge. If you do not have one, you can call the medicine clinic at [phone] to make an appointment.    While in the hospital, you were seen by pulmonology Dr. Matta.  Please call [phone] to follow up as an outpatient for formal PFTs and sleep study. Follow up with your primary care doctor within one week of discharge. If you do not have one, you can call the medicine clinic at [phone] to make an appointment.    While in the hospital, you were seen by pulmonology Dr. Matta.  Please call [phone] to follow up as an outpatient for formal PFTs and sleep study.    Please see your primary care doctor for repeat thyroid function tests in 4 weeks.

## 2021-01-20 NOTE — DISCHARGE NOTE PROVIDER - NSDCCPCAREPLAN_GEN_ALL_CORE_FT
PRINCIPAL DISCHARGE DIAGNOSIS  Diagnosis: Dyspnea, unspecified type  Assessment and Plan of Treatment:        PRINCIPAL DISCHARGE DIAGNOSIS  Diagnosis: HCAP (healthcare-associated pneumonia)  Assessment and Plan of Treatment: While in the hospital, you were treated for possible HCAP given recent admission, new cough, elevated white blood cell counts, and findings on chest xray.  A CT scan of your chest was negative for blood clot.  You were treated with a 5 day course of antibiotics which you completed.  Your oxygen was stable on 4LNC, and you should continue to wear continuous oxygen at home  as prior to hospital.  Follow up with your primary care provider within one week of discharge.      SECONDARY DISCHARGE DIAGNOSES  Diagnosis: COPD with hypoxia  Assessment and Plan of Treatment: While in the hospital, you were seen by pulmonology Dr. Matta.  Please call [phone] to follow up as an outpatient for formal PFTs and sleep study.    Diagnosis: H/O Graves' disease  Assessment and Plan of Treatment: You have a history of Graves disease.  You should continue your home dose of prednisone 5mg.  While in the hospital your thyroid levels were checked (TSH: 4.60, TSH: 4.51).  Please follow up as outpatient with your primary care provider for repeat thyroid bloodwork in 4 weeks.    Diagnosis: COVID-19  Assessment and Plan of Treatment: You have been diagnosed with the COVID-19 virus during your hospital stay. You must self quarantine to complete a 14 day time period.  Monitor for fevers, shortness of breath and cough primarily.  Monitor your temperature daily to not any changes and increases.    Studies have shown that COVID increases risk of blood clots.  To reduce your risk of blood clot, you have been started on a blood thinner Xarelto 10 mg to be taken once daily for 30 days.  Please follow up with your primary care provider.  It has been determined that you no longer need hospitalization and can recover while remaining in self-quarantine at home. You should follow the prevention steps below until a healthcare provider or local or state health department says you can return to your normal activities.  1. You should restrict activities outside your home, except for getting medical care.  2. Do not go to work, school, or public areas.  3. Avoid using public transportation, ride-sharing, or taxis.  4. Separate yourself from other people and animals in your home.  5. Call ahead before visiting your doctor.  6. Wear a facemask.  7. Cover your coughs and sneezes.  8. Clean your hands often.  9. Avoid sharing personal household items.  10. Clean all “high-touch” surfaces everyday.  11. Monitor your symptoms.  If you have a medical emergency and need to call 911, notify the dispatch personnel that you have COVID-19 If possible, put on a facemask before emergency medical services arrive.  12. Stopping home isolation.  Patients with confirmed COVID-19 should remain under home isolation precautions for 14 days since the positive COVID-19 test and until the risk of secondary transmission to others is thought to be low. The decision to discontinue home isolation precautions should be made on a case-by-case basis, in consultation with healthcare providers and state and local health departments. Your [facility] Department of Health can be reached at [phone] for further information about COVID-19.

## 2021-01-20 NOTE — DISCHARGE NOTE PROVIDER - NSDCMRMEDTOKEN_GEN_ALL_CORE_FT
acetaminophen 325 mg oral tablet: 2 tab(s) orally every 4 hours, As needed, Mild Pain (1 - 3)  albuterol 90 mcg/inh inhalation aerosol: 1 puff(s) inhaled every 4 hours   amlodipine 5 mg tablet:   ASPIRIN LOW DOSE 81MG TBEC: TAKE ONE TABLET BY MOUTH EVERY DAY  atorvastatin 40 mg tablet:   baclofen 10 mg tablet:   furosemide 20 mg tablet:   gabapentin 100 mg capsule:   hydralazine 25 mg tablet:   hydrochlorothiazide 25 mg tablet:   losartan 100 mg tablet:   MELATONIN 3MG TABS: TAKE ONE TABLET BY MOUTH EVERY DAY  montelukast 10 mg tablet:   nystatin 100,000 units/g topical powder: 1 application topically 2 times a day  olanzapine 20 mg tablet:   Oxygen: Portable and continuous oxygen to be used at 3LPM via nasal canula 24x7     Oxygen sats @ rest 93% on room air  Oxygen sats with exacerbation 77% on room air  Oxygen sats 96% on 3L via n/c on ambulation  prednisone 5 mg tablet:   sertraline 100 mg tablet:   SPIRIVA HANDIHALER 18MCG CAPS: INHALE ONE CAPSULE BY MOUTH EVERY DAY  VITAMIN D3 25 MCG(1000 UT) TABS: TAKE ONE TABLET BY MOUTH EVERY DAY   acetaminophen 325 mg oral tablet: 2 tab(s) orally every 4 hours, As needed, Mild Pain (1 - 3)  albuterol 90 mcg/inh inhalation aerosol: 2 puff(s) inhaled every 6 hours, As needed, Shortness of Breath and/or Wheezing  amLODIPine 5 mg oral tablet: 1 tab(s) orally once a day  aspirin 81 mg oral delayed release tablet: 1 tab(s) orally once a day  atorvastatin 40 mg oral tablet: 1 tab(s) orally once a day (at bedtime)  baclofen 10 mg tablet: 1 tab(s) orally every 8 hours, As Needed  furosemide 20 mg oral tablet: 1 tab(s) orally once a day  gabapentin 100 mg oral capsule: 1 cap(s) orally once a day (at bedtime)  hydralazine 25 mg tablet: 1 tab(s) orally every 8 hours  loperamide 2 mg oral capsule: 1 cap(s) orally every 8 hours, As needed, Diarrhea  losartan 50 mg oral tablet: 1 tab(s) orally once a day  melatonin 3 mg oral tablet: 1 tab(s) orally once a day (at bedtime)  montelukast 10 mg oral tablet: 1 tab(s) orally once a day (at bedtime)  Multiple Vitamins oral tablet: 1 tab(s) orally once a day  OLANZapine 10 mg oral tablet: 1 tab(s) orally once a day  Oxygen: Portable and continuous oxygen to be used at 3LPM via nasal canula 24x7     Oxygen sats @ rest 93% on room air  Oxygen sats with exacerbation 77% on room air  Oxygen sats 96% on 3L via n/c on ambulation  prednisone 5 mg tablet:   sertraline 100 mg oral tablet: 1 tab(s) orally once a day  Spiriva HandiHaler 18 mcg inhalation capsule: 1 cap(s) inhaled once a day  VITAMIN D3 25 MCG(1000 UT) TABS: TAKE ONE TABLET BY MOUTH EVERY DAY  Xarelto 10 mg oral tablet: 1 tab(s) orally once a day    acetaminophen 325 mg oral tablet: 2 tab(s) orally every 4 hours, As needed, Mild Pain (1 - 3)  albuterol 90 mcg/inh inhalation aerosol: 2 puff(s) inhaled every 6 hours, As needed, Shortness of Breath and/or Wheezing  amLODIPine 5 mg oral tablet: 1 tab(s) orally once a day  aspirin 81 mg oral delayed release tablet: 1 tab(s) orally once a day  atorvastatin 40 mg oral tablet: 1 tab(s) orally once a day (at bedtime)  baclofen 10 mg tablet: 1 tab(s) orally every 8 hours, As Needed  furosemide 20 mg oral tablet: 1 tab(s) orally once a day  gabapentin 100 mg oral capsule: 1 cap(s) orally once a day (at bedtime)  hydralazine 25 mg tablet: 1 tab(s) orally every 8 hours  loperamide 2 mg oral capsule: 1 cap(s) orally every 8 hours, As needed, Diarrhea  losartan 50 mg oral tablet: 1 tab(s) orally once a day  melatonin 3 mg oral tablet: 1 tab(s) orally once a day (at bedtime)  montelukast 10 mg oral tablet: 1 tab(s) orally once a day (at bedtime)  Multiple Vitamins oral tablet: 1 tab(s) orally once a day  OLANZapine 10 mg oral tablet: 1 tab(s) orally once a day  Oxygen: Portable and continuous oxygen to be used at 3LPM via nasal canula 24x7     Oxygen sats @ rest 93% on room air  Oxygen sats with exacerbation 77% on room air  Oxygen sats 96% on 3L via n/c on ambulation  prednisone 5 mg tablet: 1 tab(s) orally 2 times a day  sertraline 100 mg oral tablet: 1 tab(s) orally once a day  Spiriva HandiHaler 18 mcg inhalation capsule: 1 cap(s) inhaled once a day  VITAMIN D3 25 MCG(1000 UT) TABS: TAKE ONE TABLET BY MOUTH EVERY DAY  Xarelto 10 mg oral tablet: 1 tab(s) orally once a day

## 2021-01-20 NOTE — DISCHARGE NOTE PROVIDER - CARE PROVIDER_API CALL
Juan Salcedo)  Internal Medicine  [address] [place] [address]  Phone: [phone]  Fax: [phone]  Follow Up Time:     Maryellen Matta)  Critical Care Medicine; Internal Medicine; Pulmonary Disease  [address] [facility] [address]  [place] [address]  Phone: [phone]  Fax: [phone]  Follow Up Time:

## 2021-01-20 NOTE — PROGRESS NOTE ADULT - SUBJECTIVE AND OBJECTIVE BOX
PULMONARY FOLLOW UP NOTE      SHEKHAR VASQUEZ  MRN-[id number]    Patient is a 70y old  Female who presents with a chief complaint of Dyspnea (18 Jan 2021 08:11)    on nc  sitting up in bed  no complaints  feeling better      SOCIAL HISTORY  Smoking History:   quit 30 years ago; 30 pack year    REVIEW OF SYSTEMS: neg    MEDICATIONS  (STANDING):  amLODIPine   Tablet 5 milliGRAM(s) Oral daily  aspirin enteric coated 81 milliGRAM(s) Oral daily  atorvastatin 40 milliGRAM(s) Oral at bedtime  azithromycin  IVPB 500 milliGRAM(s) IV Intermittent every 24 hours  budesonide  80 MICROgram(s)/formoterol 4.5 MICROgram(s) Inhaler 2 Puff(s) Inhalation two times a day  cefepime   IVPB 1000 milliGRAM(s) IV Intermittent daily  cholecalciferol 1000 Unit(s) Oral daily  gabapentin 100 milliGRAM(s) Oral at bedtime  heparin   Injectable 7500 Unit(s) SubCutaneous every 8 hours  hydrALAZINE 25 milliGRAM(s) Oral every 8 hours  influenza   Vaccine 0.5 milliLiter(s) IntraMuscular once  magnesium oxide 400 milliGRAM(s) Oral three times a day with meals  melatonin 3 milliGRAM(s) Oral at bedtime  montelukast 10 milliGRAM(s) Oral at bedtime  multivitamin 1 Tablet(s) Oral daily  nystatin Powder 1 Application(s) Topical once  OLANZapine 10 milliGRAM(s) Oral daily  predniSONE   Tablet 5 milliGRAM(s) Oral two times a day  sertraline 100 milliGRAM(s) Oral daily  tiotropium 18 MICROgram(s) Capsule 1 Capsule(s) Inhalation daily    MEDICATIONS  (PRN):  acetaminophen   Tablet .. 650 milliGRAM(s) Oral every 6 hours PRN Temp greater or equal to 38C (100.4F), Mild Pain (1 - 3), Moderate Pain (4 - 6)  ALBUTerol    90 MICROgram(s) HFA Inhaler 2 Puff(s) Inhalation every 6 hours PRN Shortness of Breath and/or Wheezing  baclofen 10 milliGRAM(s) Oral every 8 hours PRN back pain and spasm  loperamide 2 milliGRAM(s) Oral every 8 hours PRN Diarrhea      PHYSICAL EXAMINATION:  Vital Signs Last 24 Hrs  T(C): 36.2 (19 Jan 2021 06:21), Max: 36.7 (18 Jan 2021 22:18)  T(F): 97.1 (19 Jan 2021 06:21), Max: 98 (18 Jan 2021 22:18)  HR: 79 (19 Jan 2021 06:21) (68 - 83)  BP: 134/66 (19 Jan 2021 06:21) (119/55 - 134/66)  BP(mean): 82 (19 Jan 2021 06:21) (70 - 82)  RR: 18 (19 Jan 2021 06:21) (16 - 18)  SpO2: 95% (19 Jan 2021 06:21) (95% - 97%)  GENERAL: The patient is an elderly female in nad  unlabored on 6L  obese       LABS:                        11.2   11.21 )-----------( 212      ( 19 Jan 2021 07:14 )             36.6   01-19    142  |  106  |  15  ----------------------------<  78  3.9   |  25  |  0.57    Ca    9.3      19 Jan 2021 07:14  Phos  1.6     01-19  Mg     1.6     01-19    TPro  6.2  /  Alb  2.9<L>  /  TBili  0.5  /  DBili  x   /  AST  23  /  ALT  10  /  AlkPhos  73  01-18        RADIOLOGY & ADDITIONAL STUDIES:  rad< from: Xray Chest 1 View- PORTABLE-Urgent (Xray Chest 1 View- PORTABLE-Urgent .) (01.17.21 @ 11:14) >    EXAM:  XR CHEST PORTABLE URGENT 1V        PROCEDURE DATE:  Jan 17 2021         INTERPRETATION:  CLINICAL INFORMATION: Evaluate for pneumonia.    TECHNIQUE: Frontal radiograph of the chest.    COMPARISON: Chest x-ray dated 1/6/2021.    FINDINGS:    LUNGS: Increased markings in the right greater than left lung bases may represent pneumonia.    PLEURA: No pleural effusion or pneumothorax.    HEART AND MEDIASTINUM: Heart is enlarged..    SKELETON: Degenerative changes.      IMPRESSION:    Suggestion of bilateral lower lobe pneumonia right greater than left            < end of copied text >  < from: US Duplex Venous Lower Ext Complete, Bilateral (01.06.21 @ 16:36) >    EXAM:  US DPLX LWR EXT VEINS COMPL BI                            PROCEDURE DATE:  01/06/2021          INTERPRETATION:  CLINICAL INFORMATION: Lower extremity swelling. Covid positive.    COMPARISON: 11/11/2019.    TECHNIQUE: Duplex sonography of theBILATERAL LOWER extremity veins with color and spectral Doppler, with and without compression.    FINDINGS:    There is normal compressibility of the bilateral common femoral, femoral and popliteal veins.  Doppler examination shows normal spontaneousand phasic flow.    No calf vein thrombosis is detected. Visualization of the calf veins is limited due to soft tissue edema and hence if needed follow-up imaging can be obtained in 5-7 days.    IMPRESSION:  No evidence of deep venous thrombosis in either lower extremity as described above.                AMISH MURRAY MD; Attending Radiologist  This document has been electronically signed. Jan 6 2021  4:38PM    < end of copied text >    ECHO: ester< from: TTE with Doppler (w/Cont) (05.06.19 @ 08:12) >    ------------------------------------------------------------------------  CONCLUSIONS:  Technically difficult study.  1. Mitral annular calcification, otherwise normal mitral  valve. Minimal mitral regurgitation.  2. Normal left ventricular internal dimensions and wall  thicknesses.  3. Endocardium not well visualized; grossly normal left  ventricular systolic function.  Endocardial visualization  enhanced with intravenous injection of echo contrast  (Definity).  4. Unable to accurately evaluate right ventricular size or  systolic function.  ------------------------------------------------------------------------  Confirmed on  5/6/2019 - 09:20:22 by Rachid Mills M.D.  ------------------------------------------------------------------------    < end of copied text >      ASSESSMENT:  74 yo with covid 19 admitted for hypoxia- covid 19? or chf? or VTE?    PLAN:  s/p remedsivir & dexamethasone already from previous hospitalization   high dimer -suggest CTA to r/o PE   US dopplers noted are negative  trend inflammatory markers- if no PE- agree with xarelto 10mg daily x 30 days outpatient  nebs PRN  cont symbicort  wean O2 as tolerated, goal pulse ox 92-95%  incentive spirometer  dvt prophylaxis for now based on BMI  needs formal pfts and sleep study outpatient- has our office info    Thank you for allowing me to participate in the care of this patient.  Please feel free to call me for any questions/concerns.      Maryellen Matta MD  [facility] Pulmonary/Sleep Medicine  [phone]
PULMONARY FOLLOW UP NOTE      SHEKHAR VASQUEZ  MRN-[id number]    Patient is a 70y old  Female who presents with a chief complaint of Dyspnea (18 Jan 2021 08:11)    on nc  sitting up in bed  no complaints  feeling better      SOCIAL HISTORY  Smoking History:   quit 30 years ago; 30 pack year    REVIEW OF SYSTEMS: neg    MEDICATIONS  (STANDING):  amLODIPine   Tablet 5 milliGRAM(s) Oral daily  aspirin enteric coated 81 milliGRAM(s) Oral daily  atorvastatin 40 milliGRAM(s) Oral at bedtime  azithromycin  IVPB 500 milliGRAM(s) IV Intermittent every 24 hours  budesonide  80 MICROgram(s)/formoterol 4.5 MICROgram(s) Inhaler 2 Puff(s) Inhalation two times a day  cefepime   IVPB 1000 milliGRAM(s) IV Intermittent daily  cholecalciferol 1000 Unit(s) Oral daily  furosemide    Tablet 20 milliGRAM(s) Oral daily  gabapentin 100 milliGRAM(s) Oral at bedtime  heparin   Injectable 7500 Unit(s) SubCutaneous every 8 hours  hydrALAZINE 25 milliGRAM(s) Oral every 8 hours  influenza   Vaccine 0.5 milliLiter(s) IntraMuscular once  losartan 50 milliGRAM(s) Oral daily  magnesium oxide 400 milliGRAM(s) Oral three times a day with meals  melatonin 3 milliGRAM(s) Oral at bedtime  montelukast 10 milliGRAM(s) Oral at bedtime  multivitamin 1 Tablet(s) Oral daily  OLANZapine 10 milliGRAM(s) Oral daily  predniSONE   Tablet 5 milliGRAM(s) Oral two times a day  sertraline 100 milliGRAM(s) Oral daily  tiotropium 18 MICROgram(s) Capsule 1 Capsule(s) Inhalation daily    MEDICATIONS  (PRN):  acetaminophen   Tablet .. 650 milliGRAM(s) Oral every 6 hours PRN Temp greater or equal to 38C (100.4F), Mild Pain (1 - 3), Moderate Pain (4 - 6)  ALBUTerol    90 MICROgram(s) HFA Inhaler 2 Puff(s) Inhalation every 6 hours PRN Shortness of Breath and/or Wheezing  baclofen 10 milliGRAM(s) Oral every 8 hours PRN back pain and spasm  loperamide 2 milliGRAM(s) Oral every 8 hours PRN Diarrhea      PHYSICAL EXAMINATION:  Vital Signs Last 24 Hrs  T(C): 36.6 (20 Jan 2021 05:00), Max: 37 (19 Jan 2021 21:00)  T(F): 97.9 (20 Jan 2021 05:00), Max: 98.6 (19 Jan 2021 21:00)  HR: 80 (20 Jan 2021 05:00) (80 - 86)  BP: 137/68 (20 Jan 2021 05:00) (126/65 - 137/68)  BP(mean): --  RR: 18 (20 Jan 2021 05:00) (18 - 18)  SpO2: 98% (20 Jan 2021 05:00) (97% - 98%)  GENERAL: The patient is an elderly female in nad  unlabored on 6L  obese       LABS:                                   10.8   12.73 )-----------( 195      ( 20 Jan 2021 07:30 )             35.0   01-20    143  |  105  |  9   ----------------------------<  97  3.6   |  28  |  0.57    Ca    9.2      20 Jan 2021 07:30  Phos  2.3     01-20  Mg     1.4     01-20        < from: CT Angio Chest w/ IV Cont (01.19.21 @ 11:52) >    EXAM:  CT ANGIO CHEST (W)AW IC        PROCEDURE DATE:  Jan 19 2021         INTERPRETATION:  CLINICAL INFORMATION: Elevated d-dimer, Covid positive, evaluate for pulmonary embolus. Shortness of breath.    COMPARISON: Radiograph chest 1/17/2021. CT chest 9/30/2019.    PROCEDURE:  CT Angiography of the Chest.  90 ml of Omnipaque 350 was injected intravenously. 10 ml were discarded.  Sagittal and coronal reformats were performed as well as MIPS.    FINDINGS:    CHEST:    LUNGS AND LARGE AIRWAYS: Patent central airways. Emphysema. Mucoid impaction within the posterior basilar left lower lobe bronchus. Basilar predominant mild peripheral opacities. PLEURA: No pleural effusion.  VESSELS: Good opacification of the pulmonary arterial tree. No pulmonary embolism. Atheromatous changes of the aorta.  HEART: Heart size is normal. No pericardial effusion.  MEDIASTINUM AND MIYA: Mildly enlarged right hilar lymph node measuring 1.3 cm  CHEST WALL AND LOWER NECK: Visualized thyroid gland is unremarkable. No lymphadenopathy.  VISUALIZED UPPER ABDOMEN: Partially visualized left simple renal cyst unchanged from prior. Cholecystectomy. Small fat-containing ventral hernia.  BONES: Multiple healing bilateral rib fractures. Upper thoracic spine fusion hardware in place. Degenerative changes of the thoracic spine.    IMPRESSION:    No pulmonary embolus.    Emphysema.    Basilar predominant mild peripheral opacities likely representing COVID-19 infection.          < end of copied text >  RADIOLOGY & ADDITIONAL STUDIES:  rad< from: Xray Chest 1 View- PORTABLE-Urgent (Xray Chest 1 View- PORTABLE-Urgent .) (01.17.21 @ 11:14) >    EXAM:  XR CHEST PORTABLE URGENT 1V        PROCEDURE DATE:  Jan 17 2021         INTERPRETATION:  CLINICAL INFORMATION: Evaluate for pneumonia.    TECHNIQUE: Frontal radiograph of the chest.    COMPARISON: Chest x-ray dated 1/6/2021.    FINDINGS:    LUNGS: Increased markings in the right greater than left lung bases may represent pneumonia.    PLEURA: No pleural effusion or pneumothorax.    HEART AND MEDIASTINUM: Heart is enlarged..    SKELETON: Degenerative changes.      IMPRESSION:    Suggestion of bilateral lower lobe pneumonia right greater than left            < end of copied text >  < from: US Duplex Venous Lower Ext Complete, Bilateral (01.06.21 @ 16:36) >    EXAM:  US DPLX LWR EXT VEINS COMPL BI                            PROCEDURE DATE:  01/06/2021          INTERPRETATION:  CLINICAL INFORMATION: Lower extremity swelling. Covid positive.    COMPARISON: 11/11/2019.    TECHNIQUE: Duplex sonography of theBILATERAL LOWER extremity veins with color and spectral Doppler, with and without compression.    FINDINGS:    There is normal compressibility of the bilateral common femoral, femoral and popliteal veins.  Doppler examination shows normal spontaneousand phasic flow.    No calf vein thrombosis is detected. Visualization of the calf veins is limited due to soft tissue edema and hence if needed follow-up imaging can be obtained in 5-7 days.    IMPRESSION:  No evidence of deep venous thrombosis in either lower extremity as described above.                AMISH MURRAY MD; Attending Radiologist  This document has been electronically signed. Jan 6 2021  4:38PM    < end of copied text >    ECHO: ester< from: TTE with Doppler (w/Cont) (05.06.19 @ 08:12) >    ------------------------------------------------------------------------  CONCLUSIONS:  Technically difficult study.  1. Mitral annular calcification, otherwise normal mitral  valve. Minimal mitral regurgitation.  2. Normal left ventricular internal dimensions and wall  thicknesses.  3. Endocardium not well visualized; grossly normal left  ventricular systolic function.  Endocardial visualization  enhanced with intravenous injection of echo contrast  (Definity).  4. Unable to accurately evaluate right ventricular size or  systolic function.  ------------------------------------------------------------------------  Confirmed on  5/6/2019 - 09:20:22 by Rachid Mills M.D.  ------------------------------------------------------------------------    < end of copied text >      ASSESSMENT:  72 yo with covid 19 admitted for hypoxia- covid 19? or chf? or VTE?    PLAN:  s/p remedsivir & dexamethasone already from previous hospitalization   cta neg for PE  Lovenox for dvt prophylaxis for now upon dc xarelto 10mg daily x 30 days   nebs PRN  cont symbicort  wean O2 as tolerated, goal pulse ox 92-95%  incentive spirometer    needs formal pfts and sleep study outpatient- has our office info    Thank you for allowing me to participate in the care of this patient.  Please feel free to call me for any questions/concerns.      Maryellen Matta MD  [facility] Pulmonary/Sleep Medicine  [phone]

## 2021-01-21 ENCOUNTER — TRANSCRIPTION ENCOUNTER (OUTPATIENT)
Age: 71
End: 2021-01-21

## 2021-01-21 VITALS
RESPIRATION RATE: 18 BRPM | HEART RATE: 70 BPM | OXYGEN SATURATION: 100 % | SYSTOLIC BLOOD PRESSURE: 152 MMHG | TEMPERATURE: 98 F | DIASTOLIC BLOOD PRESSURE: 69 MMHG

## 2021-01-21 LAB
ANION GAP SERPL CALC-SCNC: 10 MMOL/L — SIGNIFICANT CHANGE UP (ref 7–14)
BUN SERPL-MCNC: 8 MG/DL — SIGNIFICANT CHANGE UP (ref 7–23)
CALCIUM SERPL-MCNC: 9.7 MG/DL — SIGNIFICANT CHANGE UP (ref 8.4–10.5)
CHLORIDE SERPL-SCNC: 105 MMOL/L — SIGNIFICANT CHANGE UP (ref 98–107)
CO2 SERPL-SCNC: 30 MMOL/L — SIGNIFICANT CHANGE UP (ref 22–31)
CREAT SERPL-MCNC: 0.51 MG/DL — SIGNIFICANT CHANGE UP (ref 0.5–1.3)
GLUCOSE SERPL-MCNC: 85 MG/DL — SIGNIFICANT CHANGE UP (ref 70–99)
HCT VFR BLD CALC: 36.7 % — SIGNIFICANT CHANGE UP (ref 34.5–45)
HGB BLD-MCNC: 11.2 G/DL — LOW (ref 11.5–15.5)
MAGNESIUM SERPL-MCNC: 1.5 MG/DL — LOW (ref 1.6–2.6)
MCHC RBC-ENTMCNC: 27.7 PG — SIGNIFICANT CHANGE UP (ref 27–34)
MCHC RBC-ENTMCNC: 30.5 GM/DL — LOW (ref 32–36)
MCV RBC AUTO: 90.8 FL — SIGNIFICANT CHANGE UP (ref 80–100)
NRBC # BLD: 0 /100 WBCS — SIGNIFICANT CHANGE UP
NRBC # FLD: 0 K/UL — SIGNIFICANT CHANGE UP
PHOSPHATE SERPL-MCNC: 2.4 MG/DL — LOW (ref 2.5–4.5)
PLATELET # BLD AUTO: 197 K/UL — SIGNIFICANT CHANGE UP (ref 150–400)
POTASSIUM SERPL-MCNC: 3.8 MMOL/L — SIGNIFICANT CHANGE UP (ref 3.5–5.3)
POTASSIUM SERPL-SCNC: 3.8 MMOL/L — SIGNIFICANT CHANGE UP (ref 3.5–5.3)
RBC # BLD: 4.04 M/UL — SIGNIFICANT CHANGE UP (ref 3.8–5.2)
RBC # FLD: 15.6 % — HIGH (ref 10.3–14.5)
SODIUM SERPL-SCNC: 145 MMOL/L — SIGNIFICANT CHANGE UP (ref 135–145)
WBC # BLD: 9.57 K/UL — SIGNIFICANT CHANGE UP (ref 3.8–10.5)
WBC # FLD AUTO: 9.57 K/UL — SIGNIFICANT CHANGE UP (ref 3.8–10.5)

## 2021-01-21 RX ORDER — LOPERAMIDE HCL 2 MG
1 TABLET ORAL
Qty: 90 | Refills: 0
Start: 2021-01-21 | End: 2021-02-19

## 2021-01-21 RX ORDER — AMLODIPINE BESYLATE 2.5 MG/1
1 TABLET ORAL
Qty: 0 | Refills: 0 | DISCHARGE
Start: 2021-01-21

## 2021-01-21 RX ORDER — MAGNESIUM SULFATE 500 MG/ML
1 VIAL (ML) INJECTION ONCE
Refills: 0 | Status: COMPLETED | OUTPATIENT
Start: 2021-01-21 | End: 2021-01-21

## 2021-01-21 RX ORDER — BACLOFEN 100 %
0 POWDER (GRAM) MISCELLANEOUS
Qty: 0 | Refills: 0 | DISCHARGE

## 2021-01-21 RX ORDER — FUROSEMIDE 40 MG
1 TABLET ORAL
Qty: 0 | Refills: 0 | DISCHARGE
Start: 2021-01-21

## 2021-01-21 RX ORDER — ATORVASTATIN CALCIUM 80 MG/1
1 TABLET, FILM COATED ORAL
Qty: 0 | Refills: 0 | DISCHARGE
Start: 2021-01-21

## 2021-01-21 RX ORDER — MONTELUKAST 4 MG/1
1 TABLET, CHEWABLE ORAL
Qty: 0 | Refills: 0 | DISCHARGE
Start: 2021-01-21

## 2021-01-21 RX ORDER — GABAPENTIN 400 MG/1
0 CAPSULE ORAL
Qty: 0 | Refills: 1 | DISCHARGE

## 2021-01-21 RX ORDER — SERTRALINE 25 MG/1
1 TABLET, FILM COATED ORAL
Qty: 0 | Refills: 0 | DISCHARGE
Start: 2021-01-21

## 2021-01-21 RX ORDER — MONTELUKAST 4 MG/1
0 TABLET, CHEWABLE ORAL
Qty: 0 | Refills: 0 | DISCHARGE

## 2021-01-21 RX ORDER — LANOLIN ALCOHOL/MO/W.PET/CERES
1 CREAM (GRAM) TOPICAL
Qty: 0 | Refills: 0 | DISCHARGE
Start: 2021-01-21

## 2021-01-21 RX ORDER — AMLODIPINE BESYLATE 2.5 MG/1
0 TABLET ORAL
Qty: 0 | Refills: 0 | DISCHARGE

## 2021-01-21 RX ORDER — OLANZAPINE 15 MG/1
1 TABLET, FILM COATED ORAL
Qty: 0 | Refills: 0 | DISCHARGE
Start: 2021-01-21

## 2021-01-21 RX ORDER — SERTRALINE 25 MG/1
0 TABLET, FILM COATED ORAL
Qty: 0 | Refills: 1 | DISCHARGE

## 2021-01-21 RX ORDER — LANOLIN ALCOHOL/MO/W.PET/CERES
0 CREAM (GRAM) TOPICAL
Qty: 0 | Refills: 0 | DISCHARGE

## 2021-01-21 RX ORDER — GABAPENTIN 400 MG/1
1 CAPSULE ORAL
Qty: 0 | Refills: 0 | DISCHARGE
Start: 2021-01-21

## 2021-01-21 RX ORDER — ASPIRIN/CALCIUM CARB/MAGNESIUM 324 MG
0 TABLET ORAL
Qty: 0 | Refills: 0 | DISCHARGE

## 2021-01-21 RX ORDER — FUROSEMIDE 40 MG
0 TABLET ORAL
Qty: 0 | Refills: 0 | DISCHARGE

## 2021-01-21 RX ORDER — HYDRALAZINE HCL 50 MG
0 TABLET ORAL
Qty: 0 | Refills: 0 | DISCHARGE

## 2021-01-21 RX ORDER — ATORVASTATIN CALCIUM 80 MG/1
0 TABLET, FILM COATED ORAL
Qty: 0 | Refills: 0 | DISCHARGE

## 2021-01-21 RX ORDER — TIOTROPIUM BROMIDE 18 UG/1
0 CAPSULE ORAL; RESPIRATORY (INHALATION)
Qty: 0 | Refills: 0 | DISCHARGE

## 2021-01-21 RX ORDER — LOSARTAN POTASSIUM 100 MG/1
0 TABLET, FILM COATED ORAL
Qty: 0 | Refills: 0 | DISCHARGE

## 2021-01-21 RX ORDER — RIVAROXABAN 15 MG-20MG
1 KIT ORAL
Qty: 30 | Refills: 0
Start: 2021-01-21 | End: 2021-02-19

## 2021-01-21 RX ORDER — TIOTROPIUM BROMIDE 18 UG/1
1 CAPSULE ORAL; RESPIRATORY (INHALATION)
Qty: 0 | Refills: 0 | DISCHARGE
Start: 2021-01-21

## 2021-01-21 RX ORDER — OLANZAPINE 15 MG/1
0 TABLET, FILM COATED ORAL
Qty: 0 | Refills: 1 | DISCHARGE

## 2021-01-21 RX ORDER — ASPIRIN/CALCIUM CARB/MAGNESIUM 324 MG
1 TABLET ORAL
Qty: 0 | Refills: 0 | DISCHARGE
Start: 2021-01-21

## 2021-01-21 RX ORDER — ALBUTEROL 90 UG/1
2 AEROSOL, METERED ORAL
Qty: 0 | Refills: 0 | DISCHARGE
Start: 2021-01-21

## 2021-01-21 RX ORDER — LOSARTAN POTASSIUM 100 MG/1
1 TABLET, FILM COATED ORAL
Qty: 0 | Refills: 0 | DISCHARGE
Start: 2021-01-21

## 2021-01-21 RX ADMIN — CEFEPIME 100 MILLIGRAM(S): 1 INJECTION, POWDER, FOR SOLUTION INTRAMUSCULAR; INTRAVENOUS at 11:28

## 2021-01-21 RX ADMIN — AMLODIPINE BESYLATE 5 MILLIGRAM(S): 2.5 TABLET ORAL at 05:52

## 2021-01-21 RX ADMIN — Medication 1 TABLET(S): at 11:28

## 2021-01-21 RX ADMIN — LOSARTAN POTASSIUM 50 MILLIGRAM(S): 100 TABLET, FILM COATED ORAL at 05:52

## 2021-01-21 RX ADMIN — BUDESONIDE AND FORMOTEROL FUMARATE DIHYDRATE 2 PUFF(S): 160; 4.5 AEROSOL RESPIRATORY (INHALATION) at 11:28

## 2021-01-21 RX ADMIN — Medication 25 MILLIGRAM(S): at 05:52

## 2021-01-21 RX ADMIN — Medication 20 MILLIGRAM(S): at 05:52

## 2021-01-21 RX ADMIN — SERTRALINE 100 MILLIGRAM(S): 25 TABLET, FILM COATED ORAL at 11:28

## 2021-01-21 RX ADMIN — Medication 1000 UNIT(S): at 11:28

## 2021-01-21 RX ADMIN — Medication 5 MILLIGRAM(S): at 05:52

## 2021-01-21 RX ADMIN — HEPARIN SODIUM 7500 UNIT(S): 5000 INJECTION INTRAVENOUS; SUBCUTANEOUS at 05:52

## 2021-01-21 RX ADMIN — OLANZAPINE 10 MILLIGRAM(S): 15 TABLET, FILM COATED ORAL at 11:28

## 2021-01-21 RX ADMIN — Medication 81 MILLIGRAM(S): at 11:28

## 2021-01-21 RX ADMIN — TIOTROPIUM BROMIDE 1 CAPSULE(S): 18 CAPSULE ORAL; RESPIRATORY (INHALATION) at 11:29

## 2021-01-21 RX ADMIN — ALBUTEROL 2 PUFF(S): 90 AEROSOL, METERED ORAL at 11:28

## 2021-01-21 RX ADMIN — Medication 100 GRAM(S): at 12:23

## 2021-01-21 NOTE — DISCHARGE NOTE NURSING/CASE MANAGEMENT/SOCIAL WORK - PATIENT PORTAL LINK FT
You can access the FollowMyHealth Patient Portal offered by [facility] by registering at the following website: [email or web address]. By joining [employer]’s FollowMyHealth portal, you will also be able to view your health information using other applications (apps) compatible with our system.

## 2021-01-21 NOTE — DISCHARGE NOTE NURSING/CASE MANAGEMENT/SOCIAL WORK - NSSCNAMETXT_GEN_ALL_CORE
[facility] for your aide services. Your aide will be at your home at 1pm

## 2021-01-21 NOTE — PROGRESS NOTE ADULT - ASSESSMENT
M E D I C A L   A T T E N D I N G    F O L L O W    U P   N O T E                                     ------------------------------------------------------------------------------------------------    patient evaluated by me, case discussed with team, chart, medications, and physical exam reviewed, labs / tests  and vitals reviewed by me, as remedios.   Patient is stable for discharge today to home with O2 .  Patient to follow up with  PMD / cardio, pulm  See discharge document for full note.      ==================>> MEDICATIONS <<====================    amLODIPine   Tablet 5 milliGRAM(s) Oral daily  aspirin enteric coated 81 milliGRAM(s) Oral daily  atorvastatin 40 milliGRAM(s) Oral at bedtime  azithromycin  IVPB 500 milliGRAM(s) IV Intermittent every 24 hours  budesonide  80 MICROgram(s)/formoterol 4.5 MICROgram(s) Inhaler 2 Puff(s) Inhalation two times a day  cefepime   IVPB 1000 milliGRAM(s) IV Intermittent daily  cholecalciferol 1000 Unit(s) Oral daily  furosemide    Tablet 20 milliGRAM(s) Oral daily  gabapentin 100 milliGRAM(s) Oral at bedtime  heparin   Injectable 7500 Unit(s) SubCutaneous every 8 hours  hydrALAZINE 25 milliGRAM(s) Oral every 8 hours  influenza   Vaccine 0.5 milliLiter(s) IntraMuscular once  losartan 50 milliGRAM(s) Oral daily  magnesium oxide 400 milliGRAM(s) Oral three times a day with meals  melatonin 3 milliGRAM(s) Oral at bedtime  montelukast 10 milliGRAM(s) Oral at bedtime  multivitamin 1 Tablet(s) Oral daily  OLANZapine 10 milliGRAM(s) Oral daily  predniSONE   Tablet 5 milliGRAM(s) Oral two times a day  sertraline 100 milliGRAM(s) Oral daily  tiotropium 18 MICROgram(s) Capsule 1 Capsule(s) Inhalation daily    MEDICATIONS  (PRN):  acetaminophen   Tablet .. 650 milliGRAM(s) Oral every 6 hours PRN Temp greater or equal to 38C (100.4F), Mild Pain (1 - 3), Moderate Pain (4 - 6)  ALBUTerol    90 MICROgram(s) HFA Inhaler 2 Puff(s) Inhalation every 6 hours PRN Shortness of Breath and/or Wheezing  baclofen 10 milliGRAM(s) Oral every 8 hours PRN back pain and spasm  loperamide 2 milliGRAM(s) Oral every 8 hours PRN Diarrhea    ___________  Active diet:  Diet, Regular:   DASH/TLC Sodium & Cholesterol Restricted (DASH)  ___________________    ==================>> VITAL SIGNS <<==================    T(C): 36.6 (01-21-21 @ 05:48), Max: 36.7 (01-20-21 @ 20:56)  HR: 70 (01-21-21 @ 05:48) (70 - 78)  BP: 152/69 (01-21-21 @ 05:48) (152/69 - 153/75)  RR: 18 (01-21-21 @ 05:48) (17 - 18)  SpO2: 100% (01-21-21 @ 05:48) (99% - 100%)     I&O's Summary    21 Jan 2021 07:01  -  21 Jan 2021 14:10  --------------------------------------------------------  IN: 0 mL / OUT: 250 mL / NET: -250 mL       ==================>> LAB AND IMAGING <<==================                        11.2   9.57  )-----------( 197      ( 21 Jan 2021 07:24 )             36.7        01-21    145  |  105  |  8   ----------------------------<  85  3.8   |  30  |  0.51    Ca    9.7      21 Jan 2021 07:24  Phos  2.4     01-21  Mg     1.5     01-21       TSH:      4.51   (01-20-21)       ,     4.60   (01-19-21)           Lipid profile:  (01-18-21)     Total: 142     LDL  : (p)     HDL  :39     TG   :144     HgA1C:     (01-06-21)      5.4    WBC count:   9.57 <<== ,  12.73 <<== ,  11.21 <<== ,  11.76 <<== ,  14.54 <<== ,  16.10 <<==   Hemoglobin:   11.2 <<==,  10.8 <<==,  11.2 <<==,  10.8 <<==,  10.7 <<==,  12.3 <<==  platelets:  197 <==, 195 <==, 212 <==, 204 <==, 232 <==, 315 <==    Creatinine:  0.51  <<==, 0.57  <<==, 0.57  <<==, 0.84  <<==, 2.98  <<==  Sodium:   145  <==, 143  <==, 142  <==, 136  <==, 137  <==       AST:          23 <== , 24 <==      ALT:        10  <== , 11  <==      AP:        73  <=, 85  <=     Bili:        0.5  <=, 0.6  <=

## 2021-01-21 NOTE — DISCHARGE NOTE NURSING/CASE MANAGEMENT/SOCIAL WORK - NSDCFUADDAPPT_GEN_ALL_CORE_FT
Follow up with your primary care doctor within one week of discharge. If you do not have one, you can call the medicine clinic at [phone] to make an appointment.    While in the hospital, you were seen by pulmonology Dr. Matta.  Please call [phone] to follow up as an outpatient for formal PFTs and sleep study.

## 2021-01-22 ENCOUNTER — APPOINTMENT (OUTPATIENT)
Dept: GASTROENTEROLOGY | Facility: HOSPITAL | Age: 71
End: 2021-01-22

## 2021-01-22 LAB
CULTURE RESULTS: SIGNIFICANT CHANGE UP
CULTURE RESULTS: SIGNIFICANT CHANGE UP
SPECIMEN SOURCE: SIGNIFICANT CHANGE UP
SPECIMEN SOURCE: SIGNIFICANT CHANGE UP

## 2021-01-22 NOTE — ED ADULT NURSE NOTE - NS ED NURSE REPORT GIVEN DT
Final Anesthesia Post-op Assessment    Patient: Yanelis Arthur  Procedure(s) Performed: LEFT ENDOSCOPIC CARPAL TUNNEL RELEASE - LEFT  Anesthesia type: MAC    Vitals Value Taken Time   Temp 36.5 01/22/21 0933   Pulse 86 01/22/21 0932   Resp 14 01/22/21 0933   SpO2 94 % 01/22/21 0932   /61 01/22/21 0929   Vitals shown include unvalidated device data.      Patient Location: Phase II  Post-op Vital Signs:stable  Level of Consciousness: participates in exam, awake, alert and oriented  Respiratory Status: spontaneous ventilation  Cardiovascular blood pressure returned to baseline  Hydration: euvolemic  Pain Management: adequately controlled and adequately managed  Handoff: Handoff to receiving clinician was performed and questions were answered  Vomiting: none   Nausea: None  Airway Patency:patent  Post-op Assessment: awake, alert, appropriately conversant, or baseline, no complications, patient tolerated procedure well with no complications and evidence of recall      No complications documented.    15-May-2017 00:35

## 2021-01-26 LAB — HISTAMINE BLD-MCNC: 0.9 NG/ML — SIGNIFICANT CHANGE UP (ref 0–1)

## 2021-03-13 NOTE — DISCHARGE NOTE PROVIDER - NS AS DC PROVIDER CONTACT Y/N MULTI
Pt c/o generalized weakness, fatigue, chills, soa, cough, and loss of taste and smell. Pt denies n/v/d, fever.      Louise Carvalho, RN  03/13/21 0013    
Yes

## 2021-04-08 ENCOUNTER — OUTPATIENT (OUTPATIENT)
Dept: OUTPATIENT SERVICES | Facility: HOSPITAL | Age: 71
LOS: 1 days | End: 2021-04-08
Payer: MEDICARE

## 2021-04-08 ENCOUNTER — RESULT REVIEW (OUTPATIENT)
Age: 71
End: 2021-04-08

## 2021-04-08 ENCOUNTER — APPOINTMENT (OUTPATIENT)
Dept: ULTRASOUND IMAGING | Facility: IMAGING CENTER | Age: 71
End: 2021-04-08

## 2021-04-08 ENCOUNTER — APPOINTMENT (OUTPATIENT)
Dept: MAMMOGRAPHY | Facility: IMAGING CENTER | Age: 71
End: 2021-04-08
Payer: MEDICARE

## 2021-04-08 DIAGNOSIS — Z98.89 OTHER SPECIFIED POSTPROCEDURAL STATES: Chronic | ICD-10-CM

## 2021-04-08 DIAGNOSIS — Z00.8 ENCOUNTER FOR OTHER GENERAL EXAMINATION: ICD-10-CM

## 2021-04-08 DIAGNOSIS — Z90.49 ACQUIRED ABSENCE OF OTHER SPECIFIED PARTS OF DIGESTIVE TRACT: Chronic | ICD-10-CM

## 2021-04-08 DIAGNOSIS — Z90.5 ACQUIRED ABSENCE OF KIDNEY: Chronic | ICD-10-CM

## 2021-04-08 PROCEDURE — 77067 SCR MAMMO BI INCL CAD: CPT

## 2021-04-08 PROCEDURE — 77063 BREAST TOMOSYNTHESIS BI: CPT | Mod: 26

## 2021-04-08 PROCEDURE — 76641 ULTRASOUND BREAST COMPLETE: CPT | Mod: 26,50

## 2021-04-08 PROCEDURE — 77063 BREAST TOMOSYNTHESIS BI: CPT

## 2021-04-08 PROCEDURE — 76641 ULTRASOUND BREAST COMPLETE: CPT

## 2021-04-08 PROCEDURE — 77067 SCR MAMMO BI INCL CAD: CPT | Mod: 26

## 2021-04-12 PROCEDURE — 99442: CPT | Mod: 95

## 2021-04-16 NOTE — PATIENT PROFILE ADULT - FUNCTIONAL SCREEN CURRENT LEVEL: BATHING, MLM
2 = assistive person Implemented All Universal Safety Interventions:  [place] to call system. Call bell, personal items and telephone within reach. Instruct patient to call for assistance. Room bathroom lighting operational. Non-slip footwear when patient is off stretcher. Physically safe environment: no spills, clutter or unnecessary equipment. Stretcher in lowest position, wheels locked, appropriate side rails in place.

## 2021-05-03 PROCEDURE — 99442: CPT | Mod: 95

## 2021-05-06 NOTE — PHYSICAL THERAPY INITIAL EVALUATION ADULT - DIAGNOSIS, PT EVAL
Called and notified Lizette Vang  of stress test results.  Has echo today. Will call once that test is resulted Discussed plan in detail with patient.  Patient verbalizes understanding and all questions answered.         ----- Message from Ramesh Mason MD sent at 5/5/2021  9:17 PM CDT -----  Nl      PET Stress   IMPRESSION:     1. Normal  myocardial perfusion scan following a regadenoson injection.  Increased myocardial tracer uptake in the basal septum is suggestive of  possible basal septal hypertrophy.     2. Left ventricular function is normal following a regadenoson injection  LVEF: 64% post-regadenoson     3. Cardiac Risk Assessment:Low     4. No previous study for comparison.        difficulty with amb , transfers; decreased strength/endurance/balance

## 2021-05-21 PROCEDURE — 99442: CPT | Mod: 95

## 2021-05-24 NOTE — PATIENT PROFILE ADULT - DO YOU FEEL LIKE HURTING YOURSELF OR OTHERS?
Physical Therapy   Daily Treatment     Patient Name: Enedelia Pang  Age:  46 y.o., Sex:  female  Medical Record #: [id number]  Today's Date: 5/24/2021     Precautions: Fall Risk, Nasogastric Tube, Swallow Precautions ( See Comments), Chest Tube    Assessment    Patient remains limited by generalized weakness, impaired balance, decreased pulmonary endurance and poor activity tolerance. Patient requiring reassurance throughout session for continued participation and c/o SOB, despite SPO2 >95% throughout. Patient able to demonstrate transfers and ambulation with Brenda, requiring increased cues during turns and for line management. Patient will benefit from continued skilled IP PT in house to address impairments.   Plan    Continue current treatment plan.    DC Equipment Recommendations: (P) Unable to determine at this time  Discharge Recommendations: (P) Recommend post-acute placement for additional physical therapy services prior to discharge home     Objective       05/24/21 1220   Gait Analysis   Gait Level Of Assist Minimal Assist   Assistive Device Front Wheel Walker   Distance (Feet) 80   # of Times Distance was Traveled 2   Weight Bearing Status no restrictions   Skilled Intervention Verbal Cuing;Tactile Cuing;Sequencing;Facilitation   Comments cues during turns   Bed Mobility    Comments found and left sitting in chair   Functional Mobility   Sit to Stand Minimal Assist   Bed, Chair, Wheelchair Transfer Minimal Assist   Toilet Transfers Minimal Assist   Transfer Method Stand Step   Mobility w/ FWW   Skilled Intervention Verbal Cuing;Tactile Cuing;Facilitation;Compensatory Strategies   Comments FWW management    Patient / Family Goals    Patient / Family Goal #1 unable to effectively state   Short Term Goals    Short Term Goal # 1 Pt will be able to perform supine<>sit with HOB flat/no rails with Spv in 6tx to promote fnx progression towards  I    Goal Outcome # 1 goal not met   Short Term Goal # 2 Pt will be  able to perform sit<>stand/transfers with FWW with SPv in 6tx to promote fnx progression towards I    Goal Outcome # 2 Progressing as expected   Short Term Goal # 3 Pt will be able to ambulate 150ft with FWW iwth SPv in 6tx to promote fnx progression towards I    Goal Outcome # 3 Progressing as expected   Short Term Goal # 4 Pt will be able to ambulate up/down FOS with rail wtih Spv in 6tx to promote eventual dc to home plan   Goal Outcome # 4 Goal not met   Anticipated Discharge Equipment and Recommendations   DC Equipment Recommendations Unable to determine at this time   Discharge Recommendations Recommend post-acute placement for additional physical therapy services prior to discharge home          no

## 2021-06-03 PROCEDURE — 84145 PROCALCITONIN (PCT): CPT

## 2021-06-03 PROCEDURE — 93005 ELECTROCARDIOGRAM TRACING: CPT

## 2021-06-03 PROCEDURE — 97530 THERAPEUTIC ACTIVITIES: CPT

## 2021-06-03 PROCEDURE — 87040 BLOOD CULTURE FOR BACTERIA: CPT

## 2021-06-03 PROCEDURE — 97161 PT EVAL LOW COMPLEX 20 MIN: CPT

## 2021-06-03 PROCEDURE — U0005: CPT

## 2021-06-03 PROCEDURE — 82728 ASSAY OF FERRITIN: CPT

## 2021-06-03 PROCEDURE — 84100 ASSAY OF PHOSPHORUS: CPT

## 2021-06-03 PROCEDURE — 85610 PROTHROMBIN TIME: CPT

## 2021-06-03 PROCEDURE — 86140 C-REACTIVE PROTEIN: CPT

## 2021-06-03 PROCEDURE — 99285 EMERGENCY DEPT VISIT HI MDM: CPT | Mod: 25

## 2021-06-03 PROCEDURE — 82962 GLUCOSE BLOOD TEST: CPT

## 2021-06-03 PROCEDURE — 84443 ASSAY THYROID STIM HORMONE: CPT

## 2021-06-03 PROCEDURE — 97110 THERAPEUTIC EXERCISES: CPT

## 2021-06-03 PROCEDURE — 80061 LIPID PANEL: CPT

## 2021-06-03 PROCEDURE — 83735 ASSAY OF MAGNESIUM: CPT

## 2021-06-03 PROCEDURE — 80076 HEPATIC FUNCTION PANEL: CPT

## 2021-06-03 PROCEDURE — 86769 SARS-COV-2 COVID-19 ANTIBODY: CPT

## 2021-06-03 PROCEDURE — 83036 HEMOGLOBIN GLYCOSYLATED A1C: CPT

## 2021-06-03 PROCEDURE — 80048 BASIC METABOLIC PNL TOTAL CA: CPT

## 2021-06-03 PROCEDURE — 82607 VITAMIN B-12: CPT

## 2021-06-03 PROCEDURE — 93970 EXTREMITY STUDY: CPT

## 2021-06-03 PROCEDURE — 85027 COMPLETE CBC AUTOMATED: CPT

## 2021-06-03 PROCEDURE — U0003: CPT

## 2021-06-03 PROCEDURE — 87635 SARS-COV-2 COVID-19 AMP PRB: CPT

## 2021-06-03 PROCEDURE — 94640 AIRWAY INHALATION TREATMENT: CPT

## 2021-06-03 PROCEDURE — 71045 X-RAY EXAM CHEST 1 VIEW: CPT

## 2021-06-03 PROCEDURE — 36415 COLL VENOUS BLD VENIPUNCTURE: CPT

## 2021-06-03 PROCEDURE — 85025 COMPLETE CBC W/AUTO DIFF WBC: CPT

## 2021-06-03 PROCEDURE — 83615 LACTATE (LD) (LDH) ENZYME: CPT

## 2021-06-03 PROCEDURE — 83605 ASSAY OF LACTIC ACID: CPT

## 2021-06-03 PROCEDURE — 82746 ASSAY OF FOLIC ACID SERUM: CPT

## 2021-06-03 PROCEDURE — 80053 COMPREHEN METABOLIC PANEL: CPT

## 2021-06-03 PROCEDURE — 85379 FIBRIN DEGRADATION QUANT: CPT

## 2021-06-08 PROCEDURE — 99442: CPT | Mod: 95

## 2021-06-24 ENCOUNTER — APPOINTMENT (OUTPATIENT)
Dept: GASTROENTEROLOGY | Facility: CLINIC | Age: 71
End: 2021-06-24
Payer: MEDICARE

## 2021-06-24 VITALS
RESPIRATION RATE: 18 BRPM | SYSTOLIC BLOOD PRESSURE: 152 MMHG | DIASTOLIC BLOOD PRESSURE: 76 MMHG | TEMPERATURE: 97.1 F | HEART RATE: 79 BPM | OXYGEN SATURATION: 93 %

## 2021-06-24 DIAGNOSIS — Z23 ENCOUNTER FOR IMMUNIZATION: ICD-10-CM

## 2021-06-24 PROCEDURE — 99213 OFFICE O/P EST LOW 20 MIN: CPT

## 2021-06-24 RX ORDER — ESCITALOPRAM OXALATE 20 MG/1
20 TABLET ORAL
Qty: 30 | Refills: 2 | Status: DISCONTINUED | COMMUNITY
Start: 2019-03-14 | End: 2021-06-24

## 2021-06-24 RX ORDER — ASPIRIN 81 MG
81 TABLET, DELAYED RELEASE (ENTERIC COATED) ORAL
Refills: 0 | Status: DISCONTINUED | COMMUNITY
End: 2021-06-24

## 2021-06-24 RX ORDER — MELOXICAM 7.5 MG/1
7.5 TABLET ORAL
Qty: 30 | Refills: 0 | Status: DISCONTINUED | COMMUNITY
Start: 2018-07-09 | End: 2021-06-24

## 2021-06-24 RX ORDER — LORAZEPAM 1 MG/1
1 TABLET ORAL
Qty: 120 | Refills: 0 | Status: DISCONTINUED | COMMUNITY
Start: 2018-04-05 | End: 2021-06-24

## 2021-06-25 NOTE — PHYSICAL EXAM
[General Appearance - Alert] : alert [General Appearance - In No Acute Distress] : in no acute distress [Sclera] : the sclera and conjunctiva were normal [PERRL With Normal Accommodation] : pupils were equal in size, round, and reactive to light [Extraocular Movements] : extraocular movements were intact [Outer Ear] : the ears and nose were normal in appearance [Oropharynx] : the oropharynx was normal [Neck Appearance] : the appearance of the neck was normal [Neck Cervical Mass (___cm)] : no neck mass was observed [Jugular Venous Distention Increased] : there was no jugular-venous distention [Thyroid Diffuse Enlargement] : the thyroid was not enlarged [Thyroid Nodule] : there were no palpable thyroid nodules [] : no respiratory distress [Auscultation Breath Sounds / Voice Sounds] : lungs were clear to auscultation bilaterally [Heart Rate And Rhythm] : heart rate was normal and rhythm regular [Heart Sounds] : normal S1 and S2 [Heart Sounds Gallop] : no gallops [Murmurs] : no murmurs [Heart Sounds Pericardial Friction Rub] : no pericardial rub [Obese] : obese [Soft, Nontender] : the abdomen was soft and nontender [Normal] : normal to percussion [Cervical Lymph Nodes Enlarged Posterior Bilaterally] : posterior cervical [Cervical Lymph Nodes Enlarged Anterior Bilaterally] : anterior cervical [Supraclavicular Lymph Nodes Enlarged Bilaterally] : supraclavicular [Axillary Lymph Nodes Enlarged Bilaterally] : axillary [Femoral Lymph Nodes Enlarged Bilaterally] : femoral [Inguinal Lymph Nodes Enlarged Bilaterally] : inguinal [No CVA Tenderness] : no ~M costovertebral angle tenderness [No Spinal Tenderness] : no spinal tenderness [Deep Tendon Reflexes (DTR)] : deep tendon reflexes were 2+ and symmetric [Sensation] : the sensory exam was normal to light touch and pinprick [No Focal Deficits] : no focal deficits [Oriented To Time, Place, And Person] : oriented to person, place, and time [Impaired Insight] : insight and judgment were intact [Affect] : the affect was normal [Firm] : not firm [Rigid] : not rigid [Rebound] : no rebound [Guarding] : no guarding [Bro's] : a negative Bro's sign

## 2021-06-25 NOTE — ASSESSMENT
[FreeTextEntry1] : Attending Attestation \par \par Diarrhea \par \par We discussed diarrhea at length. Treatment depends on the cause and severity of your diarrhea. You\par should drink plenty of fluids to avoid dehydration. You should avoid drinks that contain caffeine and\par milk. Milk may make diarrhea worse. Your doctor may recommend hydration drinks for your infant or\par child. People with severe dehydration may need fluid replacement via an IV line and hospitalization.\par Avoid eating greasy foods, fatty foods, and alcohol. Bananas, applesauce, rice, and toast are helpful\par foods to eat. If you feel too sick to eat, try sucking on ice chips until you can tolerate food.\par \par Patient will have Stool Cx and GI PCR , C- Diff Toxin PCR ordered instructions provided to patient how to obtain sample and where to return specimen. \par \par She will continue to take Imodium as needed. \par \par Colonoscopy procedure ordered The risks benefits alternatives and complications of the procedure/s were explained to the patient at length. The patient was agreeable and we will proceed. \par \par Dysphagia \par \par A low acid / reflux diet was discussed in great detail including not smoking, not drinking alcohol, and not\par consuming foods that irritate the esophagus. It is helpful to eat small meals throughout the day instead\par of large meals. You should avoid eating before bedtime or lying down after you eat. It can be helpful to\par raise the head of your bed six inches. Additionally, you should maintain a healthy weight and good\par posture.. The patient was given written material to take home and review. \par \par \par EGD procedure ordered The risks benefits alternatives and complications of the procedure/s were explained to the patient at length. The patient was agreeable and we will proceed.

## 2021-06-25 NOTE — HISTORY OF PRESENT ILLNESS
[de-identified] :  70 year old woman with dysphagia. She had problems with rice and pasta but is now avoiding them. She has no further problem with other foods or liquids. Speech and swallow evaluation shows no obstruction or aspiration. Swallowing training was demonstrated. She also complains of daily diarrhea for 6 months that is new. for which she takes Imodium. She denies rectal bleeding, melena or hematemesis. \par Patient states symptoms have been ongoing for several months. She had COVID and was hospitalized in Jan 2021 and was unable to have any GI tests preformed that was ordered. She has a PMX of COPD and NOEMY she does not currently have a Pulmonary Doctor.

## 2021-06-25 NOTE — REVIEW OF SYSTEMS
[As Noted in HPI] : as noted in HPI [Diarrhea] : diarrhea [Negative] : Heme/Lymph [FreeTextEntry4] : Dysphagia

## 2021-06-29 LAB — GI PCR PANEL, STOOL: NORMAL

## 2021-06-29 PROCEDURE — 99443: CPT | Mod: 95

## 2021-07-01 LAB
C DIFF TOXIN B QL PCR REFLEX: NORMAL
GDH ANTIGEN: NOT DETECTED
TOXIN A AND B: NOT DETECTED

## 2021-07-14 PROCEDURE — 99442: CPT | Mod: 95

## 2021-07-16 ENCOUNTER — NON-APPOINTMENT (OUTPATIENT)
Age: 71
End: 2021-07-16

## 2021-07-26 NOTE — PATIENT PROFILE ADULT - NSPROMUTINFOINDIVIDFT_GEN_A_NUR
history taken, patient examined, treatment plan discussed with patient and ER staff.  Amputation grafted  post op care discussed with patient and ER staff n/a

## 2021-08-09 NOTE — DISCHARGE NOTE NURSING/CASE MANAGEMENT/SOCIAL WORK - PATIENT PORTAL LINK FT
Eliquis refill request rec electronically from [facility]. Per last office note from Dr Streeter-pt is to continue 5 mg BID    1. Continue Eliquis 5 mg twice a day.  This patient will need long-term anticoagulation.  This is agreed by neurology service.      I have routed the rx to Trish Riojas NP as Dr Streeter is out of the office. Once signed it will be escribed to [facility].   You can access the FollowMyHealth Patient Portal offered by [facility] by registering at the following website: [email or web address]. By joining [facility]’s FollowMyHealth portal, you will also be able to view your health information using other applications (apps) compatible with our system.

## 2021-08-12 PROCEDURE — 99442: CPT | Mod: 95

## 2021-08-31 ENCOUNTER — APPOINTMENT (OUTPATIENT)
Dept: GASTROENTEROLOGY | Facility: CLINIC | Age: 71
End: 2021-08-31

## 2021-08-31 ENCOUNTER — LABORATORY RESULT (OUTPATIENT)
Age: 71
End: 2021-08-31

## 2021-08-31 DIAGNOSIS — Z12.11 ENCOUNTER FOR SCREENING FOR MALIGNANT NEOPLASM OF COLON: ICD-10-CM

## 2021-09-02 PROBLEM — Z12.11 COLON CANCER SCREENING: Status: ACTIVE | Noted: 2021-09-02

## 2021-09-03 ENCOUNTER — OUTPATIENT (OUTPATIENT)
Dept: OUTPATIENT SERVICES | Facility: HOSPITAL | Age: 71
LOS: 1 days | Discharge: HOME | End: 2021-09-03
Payer: MEDICARE

## 2021-09-03 ENCOUNTER — RESULT REVIEW (OUTPATIENT)
Age: 71
End: 2021-09-03

## 2021-09-03 ENCOUNTER — APPOINTMENT (OUTPATIENT)
Dept: GASTROENTEROLOGY | Facility: HOSPITAL | Age: 71
End: 2021-09-03
Payer: MEDICARE

## 2021-09-03 VITALS
HEIGHT: 60 IN | RESPIRATION RATE: 23 BRPM | TEMPERATURE: 99 F | WEIGHT: 199.96 LBS | SYSTOLIC BLOOD PRESSURE: 155 MMHG | DIASTOLIC BLOOD PRESSURE: 63 MMHG | OXYGEN SATURATION: 94 % | HEART RATE: 97 BPM

## 2021-09-03 VITALS
HEART RATE: 95 BPM | OXYGEN SATURATION: 94 % | DIASTOLIC BLOOD PRESSURE: 60 MMHG | RESPIRATION RATE: 20 BRPM | SYSTOLIC BLOOD PRESSURE: 136 MMHG

## 2021-09-03 DIAGNOSIS — R13.10 DYSPHAGIA, UNSPECIFIED: ICD-10-CM

## 2021-09-03 DIAGNOSIS — Z98.89 OTHER SPECIFIED POSTPROCEDURAL STATES: Chronic | ICD-10-CM

## 2021-09-03 DIAGNOSIS — Z90.49 ACQUIRED ABSENCE OF OTHER SPECIFIED PARTS OF DIGESTIVE TRACT: Chronic | ICD-10-CM

## 2021-09-03 DIAGNOSIS — Z90.5 ACQUIRED ABSENCE OF KIDNEY: Chronic | ICD-10-CM

## 2021-09-03 PROCEDURE — 43239 EGD BIOPSY SINGLE/MULTIPLE: CPT

## 2021-09-03 PROCEDURE — 88313 SPECIAL STAINS GROUP 2: CPT | Mod: 26

## 2021-09-03 PROCEDURE — 45380 COLONOSCOPY AND BIOPSY: CPT | Mod: PT

## 2021-09-03 PROCEDURE — 88305 TISSUE EXAM BY PATHOLOGIST: CPT | Mod: 26

## 2021-09-03 PROCEDURE — 88312 SPECIAL STAINS GROUP 1: CPT | Mod: 26

## 2021-09-03 PROCEDURE — 88342 IMHCHEM/IMCYTCHM 1ST ANTB: CPT | Mod: 26

## 2021-09-03 PROCEDURE — 88341 IMHCHEM/IMCYTCHM EA ADD ANTB: CPT | Mod: 26

## 2021-09-09 LAB — SURGICAL PATHOLOGY STUDY: SIGNIFICANT CHANGE UP

## 2021-09-10 PROCEDURE — 99443: CPT | Mod: 95

## 2021-09-15 ENCOUNTER — APPOINTMENT (OUTPATIENT)
Dept: PAIN MANAGEMENT | Facility: CLINIC | Age: 71
End: 2021-09-15

## 2021-10-01 PROCEDURE — 99443: CPT | Mod: 95

## 2021-11-23 PROCEDURE — 99443: CPT | Mod: 95

## 2021-12-13 ENCOUNTER — APPOINTMENT (OUTPATIENT)
Dept: GASTROENTEROLOGY | Facility: CLINIC | Age: 71
End: 2021-12-13
Payer: MEDICARE

## 2021-12-13 VITALS
HEART RATE: 103 BPM | OXYGEN SATURATION: 91 % | SYSTOLIC BLOOD PRESSURE: 182 MMHG | TEMPERATURE: 98.1 F | DIASTOLIC BLOOD PRESSURE: 78 MMHG

## 2021-12-13 PROCEDURE — 99214 OFFICE O/P EST MOD 30 MIN: CPT

## 2021-12-13 NOTE — PHYSICAL EXAM
[General Appearance - Alert] : alert [General Appearance - In No Acute Distress] : in no acute distress [Sclera] : the sclera and conjunctiva were normal [PERRL With Normal Accommodation] : pupils were equal in size, round, and reactive to light [Extraocular Movements] : extraocular movements were intact [Outer Ear] : the ears and nose were normal in appearance [Oropharynx] : the oropharynx was normal [Neck Appearance] : the appearance of the neck was normal [Neck Cervical Mass (___cm)] : no neck mass was observed [Jugular Venous Distention Increased] : there was no jugular-venous distention [Thyroid Diffuse Enlargement] : the thyroid was not enlarged [Thyroid Nodule] : there were no palpable thyroid nodules [] : no respiratory distress [Auscultation Breath Sounds / Voice Sounds] : lungs were clear to auscultation bilaterally [Heart Rate And Rhythm] : heart rate was normal and rhythm regular [Heart Sounds] : normal S1 and S2 [Heart Sounds Gallop] : no gallops [Murmurs] : no murmurs [Heart Sounds Pericardial Friction Rub] : no pericardial rub [Obese] : obese [Soft, Nontender] : the abdomen was soft and nontender [Normal] : normal to percussion [Cervical Lymph Nodes Enlarged Posterior Bilaterally] : posterior cervical [Cervical Lymph Nodes Enlarged Anterior Bilaterally] : anterior cervical [Supraclavicular Lymph Nodes Enlarged Bilaterally] : supraclavicular [Axillary Lymph Nodes Enlarged Bilaterally] : axillary [Femoral Lymph Nodes Enlarged Bilaterally] : femoral [Inguinal Lymph Nodes Enlarged Bilaterally] : inguinal [Deep Tendon Reflexes (DTR)] : deep tendon reflexes were 2+ and symmetric [Sensation] : the sensory exam was normal to light touch and pinprick [No Focal Deficits] : no focal deficits [Oriented To Time, Place, And Person] : oriented to person, place, and time [Affect] : the affect was normal [Impaired Insight] : insight and judgment were intact [Firm] : not firm [Rigid] : not rigid [Rebound] : no rebound [Guarding] : no guarding [Bro's] : a negative Bro's sign

## 2021-12-13 NOTE — HISTORY OF PRESENT ILLNESS
[de-identified] : 71 year old woman with dysphagia. She had problems with rice and pasta but is now avoiding them. She has no further problem with other foods or liquids. Speech and swallow evaluation shows no obstruction or aspiration. Swallowing training was demonstrated. She also complains of daily diarrhea for 6 months that is new. for which she takes Imodium. She denies rectal bleeding, melena or hematemesis. \par Patient states symptoms have been ongoing for several months. She had COVID and was hospitalized in Jan 2021 and was unable to have any GI tests preformed that was ordered. She has a PMX of COPD and NOEMY she does not currently have a Pulmonary Doctor. \par Stool Cxs and C-diff test ordered negative in June. \par \par EGD procedure preformed 9/3/21 revealed medium sized hiatal hernia and gastritis. Pathology negative for duodenum and antrum all negative. Colonoscopy procedure preformed 9/3/21 revealed diverticulosis, non-bleeding internal hemorrhoids, and patchy mild inflammation was found in the rectum secondary to colitis. Biopsy all negative.

## 2021-12-13 NOTE — REVIEW OF SYSTEMS
[As Noted in HPI] : as noted in HPI [Diarrhea] : diarrhea [Negative] : Heme/Lymph [FreeTextEntry7] : Dysphagia

## 2021-12-23 NOTE — PATIENT PROFILE ADULT - DO YOU FEEL THREATENED BY OTHERS?
CM was asked by RN to assist with getting pt a new portable oxygen tank, pt was recently dc home from hospital with home oxygen and has only one portable oxygen tank and it is now empty and pt need new tank to transport home, SARAH called [employer] and am instructed by James Espinoza to provide pt with new tank from supply closet and encourage family to call to have tank exchanged that is empty, SARAH delivered new tank to pt in ED waiting room and instructed pt and family to call [employer] company to get empty tank refilled. Both verbalized understanding. no

## 2022-01-05 PROCEDURE — 99443: CPT | Mod: 95

## 2022-01-05 NOTE — PROGRESS NOTE BEHAVIORAL HEALTH - NS ED BHA MED ROS NEUROLOGICAL
No complaints Glycopyrrolate Pregnancy And Lactation Text: This medication is Pregnancy Category B and is considered safe during pregnancy. It is unknown if it is excreted breast milk.

## 2022-01-25 ENCOUNTER — APPOINTMENT (OUTPATIENT)
Dept: PAIN MANAGEMENT | Facility: CLINIC | Age: 72
End: 2022-01-25

## 2022-02-01 NOTE — PROVIDER CONTACT NOTE (OTHER) - REASON
"Grey Wadsworth is 3 month old, here for a preventive care visit.    Assessment & Plan     1. Encounter for routine child health examination w/o abnormal findings  Mom has appropriate support.   - Maternal Health Risk Assessment (44160) - EPDS    2. Slow weight gain in child  Will increase feedings, supplement as needed, if spitting a lot may use similac spit-up.  Recheck in 2 weeks.      Growth      Weight change since birth: 67%    OFC: Normal, Length:Normal , Weight: Low weight-for-length (<2%)    Immunizations     Appropriate vaccinations were ordered.  I provided face to face vaccine counseling, answered questions, and explained the benefits and risks of the vaccine components ordered today including:  DTaP-IPV-Hep B (Pediarix ), HIB, Pneumococcal 13-valent Conjugate (Prevnar ) and Rotavirus      Anticipatory Guidance    Reviewed age appropriate anticipatory guidance.   The following topics were discussed:  SOCIAL/ FAMILY    crying/ fussiness  NUTRITION:    pumping/ introducing bottle  HEALTH/ SAFETY:    spitting up        Referrals/Ongoing Specialty Care  No    Follow Up      Return in about 2 months (around 2022) for Preventive Care visit.    Subjective     Additional Questions 2022   Do you have any questions today that you would like to discuss? No   Has your child had a surgery, major illness or injury since the last physical exam? No     Birth History    Birth History     Birth     Length: 48.3 cm (1' 7\")     Weight: 3.34 kg (7 lb 5.8 oz)     HC 31.8 cm (12.5\")     Apgar     One: 7     Five: 8     Delivery Method: Vaginal, Spontaneous     Gestation Age: 39 3/7 wks     none     Immunization History   Administered Date(s) Administered     Hep B, Peds or Adolescent 2021     Hepatitis B # 1 given in nursery: yes  [place] metabolic screening: All components normal   hearing screen: Passed--data reviewed     [place] Hearing Screen:   Hearing Screen, Right Ear: passed        Hearing " Bilateral redness in both legs Screen, Left Ear: passed             CCHD Screen:   Right upper extremity -  Right Hand (%): 97 %     Lower extremity -  Foot (%): 100 %     CCHD Interpretation - Critical Congenital Heart Screen Result: pass       Social 2022   Who does your child live with? Parent(s), Sibling(s)   Who takes care of your child? Parent(s)   Has your child experienced any stressful family events recently? (!) BIRTH OF BABY   In the past 12 months, has lack of transportation kept you from medical appointments or from getting medications? No   In the last 12 months, was there a time when you were not able to pay the mortgage or rent on time? No   In the last 12 months, was there a time when you did not have a steady place to sleep or slept in a shelter (including now)? No       [place]  Depression Scale (EPDS) Risk Assessment: Completed [place] - Follow up as indicated    Health Risks/Safety 2022   What type of car seat does your child use?  Infant car seat   Is your child's car seat forward or rear facing? Rear facing   Where does your child sit in the car?  Back seat       TB Screening 2022   Was your child born outside of the [place]? No     TB Screening 2022   Since your last Well Child visit, have any of your child's family members or close contacts had tuberculosis or a positive tuberculosis test? No            Diet 2022   Do you have questions about feeding your baby? No   What does your baby eat?  Breast milk   How does your baby eat? Breastfeeding / Nursing   How often does your baby eat? (From the start of one feed to start of the next feed) every 1 hour   Do you give your child vitamins or supplements? None   Within the past 12 months, you worried that your food would run out before you got money to buy more. -   Within the past 12 months, the food you bought just didn't last and you didn't have money to get more. Never true     Elimination 2022   Do you have any concerns about your  "child's bladder or bowels? No concerns             Sleep 2/1/2022   Where does your baby sleep? [place], (!) CO-SLEEPER   In what position does your baby sleep? Back   How many times does your child wake in the night?  sleeps through the night     Vision/Hearing 2/1/2022   Do you have any concerns about your child's hearing or vision?  No concerns         Development/ Social-Emotional Screen 2/1/2022   Does your child receive any special services? No     Development  Screening too used, reviewed with parent or guardian: No screening tool used  Milestones (by observation/ exam/ report) 75-90% ile  PERSONAL/ SOCIAL/COGNITIVE:    Regards face    Smiles responsively  LANGUAGE:    Vocalizes    Responds to sound  GROSS MOTOR:    Lift head when prone    Kicks / equal movements  FINE MOTOR/ ADAPTIVE:    Eyes follow past midline    Reflexive grasp           Objective     Exam  Pulse 138   Temp 98  F (36.7  C)   Resp 28   Ht 0.61 m (2')   Wt 5.571 kg (12 lb 4.5 oz)   HC 40.6 cm (16\")   SpO2 100%   BMI 14.99 kg/m    39 %ile (Z= -0.29) based on WHO (Boys, 0-2 years) head circumference-for-age based on Head Circumference recorded on 2/1/2022.  7 %ile (Z= -1.49) based on WHO (Boys, 0-2 years) weight-for-age data using vitals from 2/1/2022.  23 %ile (Z= -0.72) based on WHO (Boys, 0-2 years) Length-for-age data based on Length recorded on 2/1/2022.  8 %ile (Z= -1.42) based on WHO (Boys, 0-2 years) weight-for-recumbent length data based on body measurements available as of 2/1/2022.  Physical Exam  GENERAL: Active, alert, in no acute distress.  SKIN: Clear. No significant rash, abnormal pigmentation or lesions  HEAD: Normocephalic. Normal fontanels and sutures.  EYES: Conjunctivae and cornea normal. Red reflexes present bilaterally.  EARS: Normal canals. Tympanic membranes are normal; gray and translucent.  NOSE: Normal without discharge.  MOUTH/THROAT: Clear. No oral lesions.  NECK: Supple, no masses.  LYMPH NODES: No " adenopathy  LUNGS: Clear. No rales, rhonchi, wheezing or retractions  HEART: Regular rhythm. Normal S1/S2. No murmurs. Normal femoral pulses.  ABDOMEN: Soft, non-tender, not distended, no masses or hepatosplenomegaly. Normal umbilicus and bowel sounds.   GENITALIA: Normal male external genitalia. Hunter stage I,  Testes descended bilaterally, no hernia or hydrocele.    EXTREMITIES: Hips normal with negative Ortolani and Shaver. Symmetric creases and  no deformities  NEUROLOGIC: Normal tone throughout. Normal reflexes for age      Screening Questionnaire for Pediatric Immunization    1. Is the child sick today?  No  2. Does the child have allergies to medications, food, a vaccine component, or latex? No  3. Has the child had a serious reaction to a vaccine in the past? No  4. Has the child had a health problem with lung, heart, kidney or metabolic disease (e.g., diabetes), asthma, a blood disorder, no spleen, complement component deficiency, a cochlear implant, or a spinal fluid leak?  Is he/she on long-term aspirin therapy? No  5. If the child to be vaccinated is 2 through 4 years of age, has a healthcare provider told you that the child had wheezing or asthma in the  past 12 months? No  6. If your child is a baby, have you ever been told he or she has had intussusception?  No  7. Has the child, sibling or parent had a seizure; has the child had brain or other nervous system problems?  No  8. Does the child or a family member have cancer, leukemia, HIV/AIDS, or any other immune system problem?  No  9. In the past 3 months, has the child taken medications that affect the immune system such as prednisone, other steroids, or anticancer drugs; drugs for the treatment of rheumatoid arthritis, Crohn's disease, or psoriasis; or had radiation treatments?  No  10. In the past year, has the child received a transfusion of blood or blood products, or been given immune (gamma) globulin or an antiviral drug?  No  11. Is the  child/teen pregnant or is there a chance that she could become  pregnant during the next month?  No  12. Has the child received any vaccinations in the past 4 weeks?  No     Immunization questionnaire answers were all negative.    [place] given to patient.      Screening performed by ISREAL Arshad MD  [facility]

## 2022-02-15 PROCEDURE — 99443: CPT | Mod: 95

## 2022-03-02 NOTE — BEHAVIORAL HEALTH ASSESSMENT NOTE - NSBHSUICPROTECTFACT_PSY_A_CORE
Patient will come by for blood pressure reading today in [place] .    Fear of death or dying due to pain/suffering/Identifies reasons for living/Future oriented

## 2022-03-07 PROCEDURE — 99443: CPT | Mod: 95

## 2022-03-28 PROCEDURE — 99443: CPT | Mod: 95

## 2022-04-08 NOTE — PROGRESS NOTE BEHAVIORAL HEALTH - NS ED BHA MED ROS GASTROINTESTINAL
Candelario reviewed.last filled 2/19/22. Medication refilled as requested.     Merline Enamorado MD    
LOV was 02/25/22, LRF was 02/19/22, UDS completed on 12/15/21 and malathi none on file that I see of.  
No complaints

## 2022-04-19 ENCOUNTER — APPOINTMENT (OUTPATIENT)
Dept: ULTRASOUND IMAGING | Facility: IMAGING CENTER | Age: 72
End: 2022-04-19

## 2022-04-19 ENCOUNTER — APPOINTMENT (OUTPATIENT)
Dept: MAMMOGRAPHY | Facility: IMAGING CENTER | Age: 72
End: 2022-04-19

## 2022-04-22 ENCOUNTER — INPATIENT (INPATIENT)
Facility: HOSPITAL | Age: 72
LOS: 2 days | Discharge: HOME | End: 2022-04-25
Attending: GENERAL PRACTICE | Admitting: GENERAL PRACTICE
Payer: MEDICARE

## 2022-04-22 VITALS
HEART RATE: 86 BPM | HEIGHT: 60 IN | TEMPERATURE: 99 F | RESPIRATION RATE: 18 BRPM | OXYGEN SATURATION: 94 % | SYSTOLIC BLOOD PRESSURE: 108 MMHG | DIASTOLIC BLOOD PRESSURE: 71 MMHG

## 2022-04-22 DIAGNOSIS — B34.9 VIRAL INFECTION, UNSPECIFIED: ICD-10-CM

## 2022-04-22 DIAGNOSIS — Z98.89 OTHER SPECIFIED POSTPROCEDURAL STATES: Chronic | ICD-10-CM

## 2022-04-22 DIAGNOSIS — R53.2 FUNCTIONAL QUADRIPLEGIA: ICD-10-CM

## 2022-04-22 DIAGNOSIS — Z90.49 ACQUIRED ABSENCE OF OTHER SPECIFIED PARTS OF DIGESTIVE TRACT: Chronic | ICD-10-CM

## 2022-04-22 DIAGNOSIS — E03.9 HYPOTHYROIDISM, UNSPECIFIED: ICD-10-CM

## 2022-04-22 DIAGNOSIS — L03.90 CELLULITIS, UNSPECIFIED: ICD-10-CM

## 2022-04-22 DIAGNOSIS — G47.33 OBSTRUCTIVE SLEEP APNEA (ADULT) (PEDIATRIC): ICD-10-CM

## 2022-04-22 DIAGNOSIS — Z90.5 ACQUIRED ABSENCE OF KIDNEY: Chronic | ICD-10-CM

## 2022-04-22 DIAGNOSIS — I10 ESSENTIAL (PRIMARY) HYPERTENSION: ICD-10-CM

## 2022-04-22 DIAGNOSIS — J44.9 CHRONIC OBSTRUCTIVE PULMONARY DISEASE, UNSPECIFIED: ICD-10-CM

## 2022-04-22 DIAGNOSIS — L03.116 CELLULITIS OF LEFT LOWER LIMB: ICD-10-CM

## 2022-04-22 LAB
ALBUMIN SERPL ELPH-MCNC: 3.7 G/DL — SIGNIFICANT CHANGE UP (ref 3.3–5)
ALP SERPL-CCNC: 107 U/L — SIGNIFICANT CHANGE UP (ref 40–120)
ALT FLD-CCNC: 6 U/L — SIGNIFICANT CHANGE UP (ref 4–33)
ANION GAP SERPL CALC-SCNC: 15 MMOL/L — HIGH (ref 7–14)
APTT BLD: 58.7 SEC — HIGH (ref 27–36.3)
AST SERPL-CCNC: 13 U/L — SIGNIFICANT CHANGE UP (ref 4–32)
B PERT DNA SPEC QL NAA+PROBE: SIGNIFICANT CHANGE UP
B PERT+PARAPERT DNA PNL SPEC NAA+PROBE: SIGNIFICANT CHANGE UP
BASOPHILS # BLD AUTO: 0.07 K/UL — SIGNIFICANT CHANGE UP (ref 0–0.2)
BASOPHILS NFR BLD AUTO: 0.6 % — SIGNIFICANT CHANGE UP (ref 0–2)
BILIRUB SERPL-MCNC: 0.7 MG/DL — SIGNIFICANT CHANGE UP (ref 0.2–1.2)
BLOOD GAS VENOUS COMPREHENSIVE RESULT: SIGNIFICANT CHANGE UP
BORDETELLA PARAPERTUSSIS (RAPRVP): SIGNIFICANT CHANGE UP
BUN SERPL-MCNC: 17 MG/DL — SIGNIFICANT CHANGE UP (ref 7–23)
C PNEUM DNA SPEC QL NAA+PROBE: SIGNIFICANT CHANGE UP
CALCIUM SERPL-MCNC: 9.5 MG/DL — SIGNIFICANT CHANGE UP (ref 8.4–10.5)
CHLORIDE SERPL-SCNC: 105 MMOL/L — SIGNIFICANT CHANGE UP (ref 98–107)
CO2 SERPL-SCNC: 24 MMOL/L — SIGNIFICANT CHANGE UP (ref 22–31)
CREAT SERPL-MCNC: 0.71 MG/DL — SIGNIFICANT CHANGE UP (ref 0.5–1.3)
CRP SERPL-MCNC: 15.8 MG/L — HIGH
EGFR: 90 ML/MIN/1.73M2 — SIGNIFICANT CHANGE UP
EOSINOPHIL # BLD AUTO: 0.2 K/UL — SIGNIFICANT CHANGE UP (ref 0–0.5)
EOSINOPHIL NFR BLD AUTO: 1.8 % — SIGNIFICANT CHANGE UP (ref 0–6)
FLUAV SUBTYP SPEC NAA+PROBE: SIGNIFICANT CHANGE UP
FLUBV RNA SPEC QL NAA+PROBE: SIGNIFICANT CHANGE UP
GLUCOSE SERPL-MCNC: 111 MG/DL — HIGH (ref 70–99)
HADV DNA SPEC QL NAA+PROBE: SIGNIFICANT CHANGE UP
HCOV 229E RNA SPEC QL NAA+PROBE: SIGNIFICANT CHANGE UP
HCOV HKU1 RNA SPEC QL NAA+PROBE: SIGNIFICANT CHANGE UP
HCOV NL63 RNA SPEC QL NAA+PROBE: SIGNIFICANT CHANGE UP
HCOV OC43 RNA SPEC QL NAA+PROBE: DETECTED
HCT VFR BLD CALC: 42.2 % — SIGNIFICANT CHANGE UP (ref 34.5–45)
HGB BLD-MCNC: 13.2 G/DL — SIGNIFICANT CHANGE UP (ref 11.5–15.5)
HMPV RNA SPEC QL NAA+PROBE: SIGNIFICANT CHANGE UP
HPIV1 RNA SPEC QL NAA+PROBE: SIGNIFICANT CHANGE UP
HPIV2 RNA SPEC QL NAA+PROBE: SIGNIFICANT CHANGE UP
HPIV3 RNA SPEC QL NAA+PROBE: SIGNIFICANT CHANGE UP
HPIV4 RNA SPEC QL NAA+PROBE: SIGNIFICANT CHANGE UP
IANC: 8.74 K/UL — HIGH (ref 1.8–7.4)
IMM GRANULOCYTES NFR BLD AUTO: 0.4 % — SIGNIFICANT CHANGE UP (ref 0–1.5)
INR BLD: 1.06 RATIO — SIGNIFICANT CHANGE UP (ref 0.88–1.16)
LYMPHOCYTES # BLD AUTO: 1.18 K/UL — SIGNIFICANT CHANGE UP (ref 1–3.3)
LYMPHOCYTES # BLD AUTO: 10.5 % — LOW (ref 13–44)
M PNEUMO DNA SPEC QL NAA+PROBE: SIGNIFICANT CHANGE UP
MAGNESIUM SERPL-MCNC: 2 MG/DL — SIGNIFICANT CHANGE UP (ref 1.6–2.6)
MCHC RBC-ENTMCNC: 30.7 PG — SIGNIFICANT CHANGE UP (ref 27–34)
MCHC RBC-ENTMCNC: 31.3 GM/DL — LOW (ref 32–36)
MCV RBC AUTO: 98.1 FL — SIGNIFICANT CHANGE UP (ref 80–100)
MONOCYTES # BLD AUTO: 0.99 K/UL — HIGH (ref 0–0.9)
MONOCYTES NFR BLD AUTO: 8.8 % — SIGNIFICANT CHANGE UP (ref 2–14)
NEUTROPHILS # BLD AUTO: 8.74 K/UL — HIGH (ref 1.8–7.4)
NEUTROPHILS NFR BLD AUTO: 77.9 % — HIGH (ref 43–77)
NRBC # BLD: 0 /100 WBCS — SIGNIFICANT CHANGE UP
NRBC # FLD: 0 K/UL — SIGNIFICANT CHANGE UP
PLATELET # BLD AUTO: 154 K/UL — SIGNIFICANT CHANGE UP (ref 150–400)
POTASSIUM SERPL-MCNC: 4.1 MMOL/L — SIGNIFICANT CHANGE UP (ref 3.5–5.3)
POTASSIUM SERPL-SCNC: 4.1 MMOL/L — SIGNIFICANT CHANGE UP (ref 3.5–5.3)
PROCALCITONIN SERPL-MCNC: 0.05 NG/ML — SIGNIFICANT CHANGE UP (ref 0.02–0.1)
PROT SERPL-MCNC: 7.1 G/DL — SIGNIFICANT CHANGE UP (ref 6–8.3)
PROTHROM AB SERPL-ACNC: 12.3 SEC — SIGNIFICANT CHANGE UP (ref 10.5–13.4)
RAPID RVP RESULT: DETECTED
RBC # BLD: 4.3 M/UL — SIGNIFICANT CHANGE UP (ref 3.8–5.2)
RBC # FLD: 15.2 % — HIGH (ref 10.3–14.5)
RSV RNA SPEC QL NAA+PROBE: SIGNIFICANT CHANGE UP
RV+EV RNA SPEC QL NAA+PROBE: SIGNIFICANT CHANGE UP
SARS-COV-2 RNA SPEC QL NAA+PROBE: SIGNIFICANT CHANGE UP
SODIUM SERPL-SCNC: 144 MMOL/L — SIGNIFICANT CHANGE UP (ref 135–145)
WBC # BLD: 11.23 K/UL — HIGH (ref 3.8–10.5)
WBC # FLD AUTO: 11.23 K/UL — HIGH (ref 3.8–10.5)

## 2022-04-22 PROCEDURE — 93970 EXTREMITY STUDY: CPT | Mod: 26

## 2022-04-22 PROCEDURE — 71045 X-RAY EXAM CHEST 1 VIEW: CPT | Mod: 26

## 2022-04-22 PROCEDURE — 99285 EMERGENCY DEPT VISIT HI MDM: CPT

## 2022-04-22 PROCEDURE — 73590 X-RAY EXAM OF LOWER LEG: CPT | Mod: 26,50

## 2022-04-22 RX ORDER — ASPIRIN/CALCIUM CARB/MAGNESIUM 324 MG
81 TABLET ORAL DAILY
Refills: 0 | Status: DISCONTINUED | OUTPATIENT
Start: 2022-04-22 | End: 2022-04-25

## 2022-04-22 RX ORDER — PIPERACILLIN AND TAZOBACTAM 4; .5 G/20ML; G/20ML
3.38 INJECTION, POWDER, LYOPHILIZED, FOR SOLUTION INTRAVENOUS ONCE
Refills: 0 | Status: COMPLETED | OUTPATIENT
Start: 2022-04-22 | End: 2022-04-22

## 2022-04-22 RX ORDER — CHOLECALCIFEROL (VITAMIN D3) 125 MCG
0 CAPSULE ORAL
Qty: 0 | Refills: 0 | DISCHARGE

## 2022-04-22 RX ORDER — DULOXETINE HYDROCHLORIDE 30 MG/1
1 CAPSULE, DELAYED RELEASE ORAL
Qty: 0 | Refills: 0 | DISCHARGE

## 2022-04-22 RX ORDER — LANOLIN ALCOHOL/MO/W.PET/CERES
3 CREAM (GRAM) TOPICAL AT BEDTIME
Refills: 0 | Status: DISCONTINUED | OUTPATIENT
Start: 2022-04-22 | End: 2022-04-25

## 2022-04-22 RX ORDER — ATORVASTATIN CALCIUM 80 MG/1
40 TABLET, FILM COATED ORAL AT BEDTIME
Refills: 0 | Status: DISCONTINUED | OUTPATIENT
Start: 2022-04-22 | End: 2022-04-25

## 2022-04-22 RX ORDER — DULOXETINE HYDROCHLORIDE 30 MG/1
60 CAPSULE, DELAYED RELEASE ORAL
Refills: 0 | Status: DISCONTINUED | OUTPATIENT
Start: 2022-04-22 | End: 2022-04-25

## 2022-04-22 RX ORDER — NAFCILLIN 10 G/100ML
2 INJECTION, POWDER, FOR SOLUTION INTRAVENOUS EVERY 4 HOURS
Refills: 0 | Status: DISCONTINUED | OUTPATIENT
Start: 2022-04-22 | End: 2022-04-23

## 2022-04-22 RX ORDER — MONTELUKAST 4 MG/1
10 TABLET, CHEWABLE ORAL DAILY
Refills: 0 | Status: DISCONTINUED | OUTPATIENT
Start: 2022-04-22 | End: 2022-04-25

## 2022-04-22 RX ORDER — NAFCILLIN 10 G/100ML
1 INJECTION, POWDER, FOR SOLUTION INTRAVENOUS EVERY 4 HOURS
Refills: 0 | Status: DISCONTINUED | OUTPATIENT
Start: 2022-04-22 | End: 2022-04-22

## 2022-04-22 RX ORDER — LOSARTAN POTASSIUM 100 MG/1
100 TABLET, FILM COATED ORAL DAILY
Refills: 0 | Status: DISCONTINUED | OUTPATIENT
Start: 2022-04-22 | End: 2022-04-25

## 2022-04-22 RX ORDER — LEVOTHYROXINE SODIUM 125 MCG
125 TABLET ORAL DAILY
Refills: 0 | Status: DISCONTINUED | OUTPATIENT
Start: 2022-04-22 | End: 2022-04-25

## 2022-04-22 RX ORDER — TIOTROPIUM BROMIDE 18 UG/1
1 CAPSULE ORAL; RESPIRATORY (INHALATION) DAILY
Refills: 0 | Status: DISCONTINUED | OUTPATIENT
Start: 2022-04-22 | End: 2022-04-25

## 2022-04-22 RX ORDER — GABAPENTIN 400 MG/1
200 CAPSULE ORAL
Refills: 0 | Status: DISCONTINUED | OUTPATIENT
Start: 2022-04-22 | End: 2022-04-25

## 2022-04-22 RX ORDER — ACETAMINOPHEN 500 MG
650 TABLET ORAL EVERY 6 HOURS
Refills: 0 | Status: DISCONTINUED | OUTPATIENT
Start: 2022-04-22 | End: 2022-04-25

## 2022-04-22 RX ORDER — ENOXAPARIN SODIUM 100 MG/ML
40 INJECTION SUBCUTANEOUS EVERY 24 HOURS
Refills: 0 | Status: DISCONTINUED | OUTPATIENT
Start: 2022-04-22 | End: 2022-04-25

## 2022-04-22 RX ORDER — HYDRALAZINE HCL 50 MG
25 TABLET ORAL EVERY 8 HOURS
Refills: 0 | Status: DISCONTINUED | OUTPATIENT
Start: 2022-04-22 | End: 2022-04-25

## 2022-04-22 RX ORDER — AMLODIPINE BESYLATE 2.5 MG/1
5 TABLET ORAL DAILY
Refills: 0 | Status: DISCONTINUED | OUTPATIENT
Start: 2022-04-22 | End: 2022-04-25

## 2022-04-22 RX ORDER — BACLOFEN 100 %
10 POWDER (GRAM) MISCELLANEOUS EVERY 8 HOURS
Refills: 0 | Status: DISCONTINUED | OUTPATIENT
Start: 2022-04-22 | End: 2022-04-25

## 2022-04-22 RX ORDER — LEVOTHYROXINE SODIUM 125 MCG
1 TABLET ORAL
Qty: 0 | Refills: 0 | DISCHARGE

## 2022-04-22 RX ORDER — BACLOFEN 100 %
1 POWDER (GRAM) MISCELLANEOUS
Qty: 0 | Refills: 0 | DISCHARGE

## 2022-04-22 RX ORDER — FUROSEMIDE 40 MG
40 TABLET ORAL DAILY
Refills: 0 | Status: DISCONTINUED | OUTPATIENT
Start: 2022-04-22 | End: 2022-04-25

## 2022-04-22 RX ORDER — OLANZAPINE 15 MG/1
20 TABLET, FILM COATED ORAL DAILY
Refills: 0 | Status: DISCONTINUED | OUTPATIENT
Start: 2022-04-22 | End: 2022-04-25

## 2022-04-22 RX ORDER — HYDRALAZINE HCL 50 MG
1 TABLET ORAL
Qty: 0 | Refills: 0 | DISCHARGE

## 2022-04-22 RX ADMIN — ATORVASTATIN CALCIUM 40 MILLIGRAM(S): 80 TABLET, FILM COATED ORAL at 21:18

## 2022-04-22 RX ADMIN — MONTELUKAST 10 MILLIGRAM(S): 4 TABLET, CHEWABLE ORAL at 21:17

## 2022-04-22 RX ADMIN — Medication 25 MILLIGRAM(S): at 21:18

## 2022-04-22 RX ADMIN — OLANZAPINE 20 MILLIGRAM(S): 15 TABLET, FILM COATED ORAL at 21:17

## 2022-04-22 RX ADMIN — Medication 10 MILLIGRAM(S): at 21:18

## 2022-04-22 RX ADMIN — Medication 3 MILLIGRAM(S): at 21:18

## 2022-04-22 RX ADMIN — PIPERACILLIN AND TAZOBACTAM 200 GRAM(S): 4; .5 INJECTION, POWDER, LYOPHILIZED, FOR SOLUTION INTRAVENOUS at 12:08

## 2022-04-22 NOTE — ED PROVIDER NOTE - CLINICAL SUMMARY MEDICAL DECISION MAKING FREE TEXT BOX
71F hx of Anxiety, Asthma, Breast cancer in situ, COPD not on home O2,. past DVT not presently on A/C, Graves disease, Hyperlipidemia, Hypertension, Hypothyroid, Parathyroidectomy, Kidney cancer, primary, with metastasis from kidney to other site, Left sided partial nephrectomy (no chemo or RT), Obesity, Sleep apnea, prior COVID here for bilateral LE swelling with new redness and bruise to right medial ankle. Hmeodynamically stable. No flucutance nor crepitations. Good cap refil in feet, warm, hard to palpate pulses 2/2 habitus. + femoral and politeal pulses (palpable). No signs of nec fasc on exam but will need further eval to see if pure cellulitis or complicated skin infection. Eval for DVT as well given mobility status and hx. Check labs, cultures, US, xrays, given abxs. For O2 sat, given no resp complaints, will monitor off O2 given copd hx.

## 2022-04-22 NOTE — ED PROVIDER NOTE - OBJECTIVE STATEMENT
71F hx of Anxiety, Asthma, Breast cancer in situ, COPD not on home O2,. past DVT not presently on A/C, Graves disease, Hyperlipidemia, Hypertension, Hypothyroid, Parathyroidectomy, Kidney cancer, primary, with metastasis from kidney to other site, Left sided partial nephrectomy (no chemo or RT), Obesity, Sleep apnea, prior COVID, who presents to the for 1 day of bilateral LE swelling and redness, with blister on right medial ankle region. Wheelchair bound. Denies falls, trauma, fever, chills. Of note, patient has been trying lasix 40mg qd for LE swelling (going on for weeks now) w/o sig improvement. Patient also on pred 5mg qd reportedly for asthma.

## 2022-04-22 NOTE — CHART NOTE - NSCHARTNOTEFT_GEN_A_CORE
Discussed with Dr. Choudhary, will start patient on Nafcillin antibiotics treatment for b/l lower extremity cellulitis.     Jaqueline Gupta PA-C  pager [phone]

## 2022-04-22 NOTE — ED ADULT TRIAGE NOTE - CHIEF COMPLAINT QUOTE
L/E swelling and redness started 3 days ago lives at home was on recent ABX for eye infection L/E swelling and redness started 3 days ago lives at home was on recent ABX for eye infection  has hx of spinal cord damage uses wheel chair primarily

## 2022-04-22 NOTE — ED ADULT NURSE NOTE - OBJECTIVE STATEMENT
Received patient to 5 for leg swelling. A&o4, wheelchair bound, c/o BL leg swelling and redness. Legs noted to be swollen and blistered, red and hot to touch, noted to have 1 blackened blister c/o pain. Rr even and unlabored, hx of COPD not on home o2, denies chest pain, sob, fever, chills. NSR on monitor at this time. left 20G AC placed.

## 2022-04-22 NOTE — ED PROVIDER NOTE - PHYSICAL EXAMINATION
GENERAL: elderly female, elevated BMI, lying in bed, NAD. O2 sat 93% on RA but no resp distress, no stridor, speaking in clear, complete sentences. Otherwise Vital signs are within normal limits  EYES: Conjunctiva noninjected or pale, sclera anicteric  HENT: NC/AT, moist mucous membranes  NECK: Supple, trachea midline  LUNG: Nonlabored respirations, no wheezes, rales  CV: RRR, Pulses- Radial/femoral: 2+ bilateral and equal  ABDOMEN: Nondistended, nontender, no guarding.  MSK: No visible deformities, nontender extremities  SKIN: bilateral LE swelling warm, cap refil < 2 seconds. + small 0.5cm bruise to right medial ankle, redness. Also redness 6inch by 6 inch on LLE  NEURO: AAOx4 (to person, place, time, event), no tremor, sensation intact to touch bilateral LE  PSYCH: Normal mood and affect GENERAL: elderly female, elevated BMI, lying in bed, NAD. O2 sat 93% on RA but no resp distress, no stridor, speaking in clear, complete sentences. Otherwise Vital signs are within normal limits  EYES: Conjunctiva noninjected or pale, sclera anicteric  HENT: NC/AT, moist mucous membranes  NECK: Supple, trachea midline  LUNG: Nonlabored respirations, no wheezes, rales  CV: RRR, Pulses- Radial/femoral/popliteal: 2+ bilateral and equal  ABDOMEN: Nondistended, nontender, no guarding.  MSK: No visible deformities, nontender extremities  SKIN: bilateral LE swelling warm, cap refil < 2 seconds. + small 0.5cm bruise to right medial ankle, redness. Also redness 6inch by 6 inch on LLE. No fluctuance or crepitations.  NEURO: AAOx4 (to person, place, time, event), no tremor, sensation intact to touch bilateral LE  PSYCH: Normal mood and affect

## 2022-04-22 NOTE — ED ADULT NURSE NOTE - CHIEF COMPLAINT QUOTE
L/E swelling and redness started 3 days ago lives at home was on recent ABX for eye infection  has hx of spinal cord damage uses wheel chair primarily

## 2022-04-22 NOTE — ED PROVIDER NOTE - ATTENDING CONTRIBUTION TO CARE
Dr. 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  At baseline pt is wheelchair bound.  No fever, CP/SOB, N/V/D or abdominal pain.  Pt is on Lasix 40 mg daily for leg swelling but has not had improvement.  On exam pt chronically-ill appearing but in NAD.  Both legs with massive equal edema, moderate left LE anterior erythema and warmth consistent with cellulitis, and right LE distal medial mal with area of ecchymosis and erythema.  Difficult to palpate bilateral DP pulses due to foot edema, but both feet warm and perfused.

## 2022-04-22 NOTE — H&P ADULT - EXTREMITIES COMMENTS
Airway  Urgency: elective    Airway not difficult    General Information and Staff    Patient location during procedure: OR  CRNA: RADHA FIELD    Indications and Patient Condition  Indications for airway management: airway protection    Preoxygenated: yes  MILS not maintained throughout  Mask difficulty assessment: 1 - vent by mask    Final Airway Details  Final airway type: supraglottic airway      Successful airway: I-gel  Size 4    Number of attempts at approach: 1              
Both legs with erythema ,warm to touch and few blisters.

## 2022-04-22 NOTE — PHARMACOTHERAPY INTERVENTION NOTE - COMMENTS
Medication history is complete. Medication list updated in Outpatient Medication Record ([facility]). Please call spectra [phone] if you have any questions.   source: patient and pharmacy

## 2022-04-22 NOTE — ED PROVIDER NOTE - NS ED ROS FT
CONSTITUTIONAL: No fevers, chills, fatigue, dizziness, weakness  EYES: No double vision, blurry vision  ENT: No nasal congestion, runny nose, sore throat  CV: No chest pain, palpitations  PULM: No cough, shortness of breath  GI: No abdominal pain, nausea, vomiting, diarrhea, constipation  : No dysuria, polyuria, hematuria  SKIN: + redness to bilateral shins, + blister right medial ankle. + swelling bilateral LE  MSK: + bilateral LE pain  NEURO: No headache, paresthesias  PSYCHIATRIC: Denies suicidal ideations

## 2022-04-22 NOTE — H&P ADULT - PROBLEM SELECTOR PLAN 1
Doppler legs negative for DVT. Has Leucocytosis but no fever etc. IV Abxs started and will continue . ID consulted.  Will consult wound care also .

## 2022-04-23 LAB
ANION GAP SERPL CALC-SCNC: 12 MMOL/L — SIGNIFICANT CHANGE UP (ref 7–14)
BUN SERPL-MCNC: 16 MG/DL — SIGNIFICANT CHANGE UP (ref 7–23)
CALCIUM SERPL-MCNC: 9.7 MG/DL — SIGNIFICANT CHANGE UP (ref 8.4–10.5)
CHLORIDE SERPL-SCNC: 104 MMOL/L — SIGNIFICANT CHANGE UP (ref 98–107)
CO2 SERPL-SCNC: 27 MMOL/L — SIGNIFICANT CHANGE UP (ref 22–31)
CREAT SERPL-MCNC: 0.73 MG/DL — SIGNIFICANT CHANGE UP (ref 0.5–1.3)
EGFR: 87 ML/MIN/1.73M2 — SIGNIFICANT CHANGE UP
GLUCOSE SERPL-MCNC: 136 MG/DL — HIGH (ref 70–99)
HCT VFR BLD CALC: 43.6 % — SIGNIFICANT CHANGE UP (ref 34.5–45)
HGB BLD-MCNC: 13.2 G/DL — SIGNIFICANT CHANGE UP (ref 11.5–15.5)
MCHC RBC-ENTMCNC: 30.3 GM/DL — LOW (ref 32–36)
MCHC RBC-ENTMCNC: 30.3 PG — SIGNIFICANT CHANGE UP (ref 27–34)
MCV RBC AUTO: 100.2 FL — HIGH (ref 80–100)
NRBC # BLD: 0 /100 WBCS — SIGNIFICANT CHANGE UP
NRBC # FLD: 0 K/UL — SIGNIFICANT CHANGE UP
PLATELET # BLD AUTO: 153 K/UL — SIGNIFICANT CHANGE UP (ref 150–400)
POTASSIUM SERPL-MCNC: 4.1 MMOL/L — SIGNIFICANT CHANGE UP (ref 3.5–5.3)
POTASSIUM SERPL-SCNC: 4.1 MMOL/L — SIGNIFICANT CHANGE UP (ref 3.5–5.3)
RBC # BLD: 4.35 M/UL — SIGNIFICANT CHANGE UP (ref 3.8–5.2)
RBC # FLD: 15.1 % — HIGH (ref 10.3–14.5)
SODIUM SERPL-SCNC: 143 MMOL/L — SIGNIFICANT CHANGE UP (ref 135–145)
WBC # BLD: 12.25 K/UL — HIGH (ref 3.8–10.5)
WBC # FLD AUTO: 12.25 K/UL — HIGH (ref 3.8–10.5)

## 2022-04-23 PROCEDURE — 99222 1ST HOSP IP/OBS MODERATE 55: CPT | Mod: GC

## 2022-04-23 RX ADMIN — Medication 25 MILLIGRAM(S): at 05:51

## 2022-04-23 RX ADMIN — Medication 25 MILLIGRAM(S): at 14:06

## 2022-04-23 RX ADMIN — Medication 40 MILLIGRAM(S): at 05:47

## 2022-04-23 RX ADMIN — Medication 81 MILLIGRAM(S): at 14:05

## 2022-04-23 RX ADMIN — ENOXAPARIN SODIUM 40 MILLIGRAM(S): 100 INJECTION SUBCUTANEOUS at 14:05

## 2022-04-23 RX ADMIN — OLANZAPINE 20 MILLIGRAM(S): 15 TABLET, FILM COATED ORAL at 14:05

## 2022-04-23 RX ADMIN — Medication 3 MILLIGRAM(S): at 23:02

## 2022-04-23 RX ADMIN — GABAPENTIN 200 MILLIGRAM(S): 400 CAPSULE ORAL at 05:48

## 2022-04-23 RX ADMIN — DULOXETINE HYDROCHLORIDE 60 MILLIGRAM(S): 30 CAPSULE, DELAYED RELEASE ORAL at 17:58

## 2022-04-23 RX ADMIN — Medication 10 MILLIGRAM(S): at 05:47

## 2022-04-23 RX ADMIN — DULOXETINE HYDROCHLORIDE 60 MILLIGRAM(S): 30 CAPSULE, DELAYED RELEASE ORAL at 05:50

## 2022-04-23 RX ADMIN — TIOTROPIUM BROMIDE 1 CAPSULE(S): 18 CAPSULE ORAL; RESPIRATORY (INHALATION) at 14:05

## 2022-04-23 RX ADMIN — Medication 10 MILLIGRAM(S): at 14:06

## 2022-04-23 RX ADMIN — ATORVASTATIN CALCIUM 40 MILLIGRAM(S): 80 TABLET, FILM COATED ORAL at 23:02

## 2022-04-23 RX ADMIN — Medication 10 MILLIGRAM(S): at 18:01

## 2022-04-23 RX ADMIN — Medication 125 MICROGRAM(S): at 05:48

## 2022-04-23 RX ADMIN — Medication 5 MILLIGRAM(S): at 05:49

## 2022-04-23 RX ADMIN — NAFCILLIN 100 GRAM(S): 10 INJECTION, POWDER, FOR SOLUTION INTRAVENOUS at 02:22

## 2022-04-23 RX ADMIN — GABAPENTIN 200 MILLIGRAM(S): 400 CAPSULE ORAL at 17:58

## 2022-04-23 RX ADMIN — LOSARTAN POTASSIUM 100 MILLIGRAM(S): 100 TABLET, FILM COATED ORAL at 05:57

## 2022-04-23 RX ADMIN — Medication 10 MILLIGRAM(S): at 23:02

## 2022-04-23 RX ADMIN — AMLODIPINE BESYLATE 5 MILLIGRAM(S): 2.5 TABLET ORAL at 05:48

## 2022-04-23 RX ADMIN — Medication 25 MILLIGRAM(S): at 23:02

## 2022-04-23 RX ADMIN — NAFCILLIN 100 GRAM(S): 10 INJECTION, POWDER, FOR SOLUTION INTRAVENOUS at 06:34

## 2022-04-23 RX ADMIN — MONTELUKAST 10 MILLIGRAM(S): 4 TABLET, CHEWABLE ORAL at 14:05

## 2022-04-23 NOTE — CONSULT NOTE ADULT - SUBJECTIVE AND OBJECTIVE BOX
CHIEF COMPLAINT: edema    HISTORY OF PRESENT ILLNESS:  71F hx of Anxiety, Asthma, Breast cancer in situ, COPD not on home O2,. past DVT not presently on A/C, Graves disease, Hyperlipidemia, Hypertension, Hypothyroid, Parathyroidectomy, Kidney cancer, primary, with metastasis from kidney to other site, Left sided partial nephrectomy (no chemo or RT), Obesity, Sleep apnea, prior COVID, who presents to the for 1 day of bilateral LE swelling and redness, with blister on right medial ankle region. Wheelchair bound. Denies falls, trauma, fever, chills. Of note, patient has been trying lasix 40mg qd for LE swelling (going on for weeks now) w/o sig improvement. Patient also on pred 5mg qd reportedly for asthma.      Allergies    Grapes (Hives)  No Known Drug Allergies  Peaches (Hives)  shellfish (Hives)    Intolerances    	    MEDICATIONS:  amLODIPine   Tablet 5 milliGRAM(s) Oral daily  aspirin enteric coated 81 milliGRAM(s) Oral daily  enoxaparin Injectable 40 milliGRAM(s) SubCutaneous every 24 hours  furosemide    Tablet 40 milliGRAM(s) Oral daily  hydrALAZINE 25 milliGRAM(s) Oral every 8 hours  losartan 100 milliGRAM(s) Oral daily      montelukast 10 milliGRAM(s) Oral daily  tiotropium 18 MICROgram(s) Capsule 1 Capsule(s) Inhalation daily    acetaminophen     Tablet .. 650 milliGRAM(s) Oral every 6 hours PRN  baclofen 10 milliGRAM(s) Oral every 8 hours  busPIRone 10 milliGRAM(s) Oral <User Schedule>  DULoxetine 60 milliGRAM(s) Oral two times a day  gabapentin 200 milliGRAM(s) Oral two times a day  melatonin 3 milliGRAM(s) Oral at bedtime  OLANZapine 20 milliGRAM(s) Oral daily      atorvastatin 40 milliGRAM(s) Oral at bedtime  levothyroxine 125 MICROGram(s) Oral daily  predniSONE   Tablet 5 milliGRAM(s) Oral daily        PAST MEDICAL & SURGICAL HISTORY:  Hypertension    Hyperlipidemia    Anxiety    Graves disease    Kidney cancer, primary, with metastasis from kidney to other site, left  LEft sided- s/p nephrectomy only- no chemo or RT    Breast cancer in situ, left    COPD (chronic obstructive pulmonary disease)    Hypothyroid    DVT (deep venous thrombosis)  history of- not presently on A/C    Obesity    Sleep apnea    Asthma    S/P cholecystectomy    S/p nephrectomy  left    S/P breast biopsy  left    History of pelvic surgery  Pelvic Sling    History of eye surgery  7 surgeries secondary to grave&#x27;s disease    History of carpal tunnel release  right wrist    History of parathyroidectomy    S/P laminectomy  now bed and wheelchair ridden with severe pain        FAMILY HISTORY:  Family history of myocardial infarction  mother  at age 67 and father at age 67 of MI&#x27;s    Family history of hypertension  mother and father    Family history of diabetes mellitus (Sibling)  siblings    Family history of heart disease (Sibling)  brother has a PPM    Family history of thyroid cancer (Child)  daughter has thyroid cancer        SOCIAL HISTORY:    non smoker. dependent on adls      REVIEW OF SYSTEMS:  See HPI, otherwise complete 10 point review of systems negative    [ ] All others negative	      PHYSICAL EXAM:  T(C): 36.3 (22 @ 05:43), Max: 37.1 (22 @ 15:27)  HR: 78 (22 @ 05:43) (73 - 86)  BP: 146/76 (22 @ 05:43) (108/71 - 159/77)  RR: 20 (22 @ 05:43) (16 - 20)  SpO2: 95% (22 @ 05:43) (94% - 99%)  Wt(kg): --  I&O's Summary      Appearance: No Acute Distress	  HEENT:  Normal oral mucosa, PERRL, EOMI	  Cardiovascular: Normal S1 S2, No JVD, No murmurs/rubs/gallops  Respiratory: Lungs clear to auscultation bilaterally  Gastrointestinal:  Soft, Non-tender, + BS	  Skin: No rashes, No ecchymoses, No cyanosis	  Neurologic: Non-focal  Extremities: No clubbing, cyanosis. 3+ edema  Vascular: Peripheral pulses palpable 2+ bilaterally  Psychiatry: A & O x 3, Mood & affect appropriate    Laboratory Data:	 	    CBC Full  -  ( 2022 06:27 )  WBC Count : 12.25 K/uL  Hemoglobin : 13.2 g/dL  Hematocrit : 43.6 %  Platelet Count - Automated : 153 K/uL  Mean Cell Volume : 100.2 fL  Mean Cell Hemoglobin : 30.3 pg  Mean Cell Hemoglobin Concentration : 30.3 gm/dL  Auto Neutrophil # : x  Auto Lymphocyte # : x  Auto Monocyte # : x  Auto Eosinophil # : x  Auto Basophil # : x  Auto Neutrophil % : x  Auto Lymphocyte % : x  Auto Monocyte % : x  Auto Eosinophil % : x  Auto Basophil % : x        143  |  104  |  16  ----------------------------<  136<H>  4.1   |  27  |  0.73      144  |  105  |  17  ----------------------------<  111<H>  4.1   |  24  |  0.71    Ca    9.7      2022 06:27  Ca    9.5      2022 12:25  Mg     2.00         TPro  7.1  /  Alb  3.7  /  TBili  0.7  /  DBili  x   /  AST  13  /  ALT  6   /  AlkPhos  107        proBNP:   Lipid Profile:   HgA1c:   TSH:       CARDIAC MARKERS:            Interpretation of Telemetry: 	    ECG:  	not uploaded  RADIOLOGY:  OTHER: 	    PREVIOUS DIAGNOSTIC TESTING:    [ ] Echocardiogram:  [ ] Catheterization:  [ ] Stress Test:  	    Assessment:  lower ext edema  htn  venous insufficiency  melba  copd  anxiety    Recs:  cv status appears stable  lower ext edema without signs of chf. suspect venous insufficiency. le giovanni duplex neg dvt. resume lasix 40mg po qd. check bnp. check 2d echo  id consult appreciated  vascular consult  leg elevation and compression stockings  cw antihypertensives  will follow  Advanced care planning forms were discussed. Code status including forceful chest compressions, defibrillation and intubation were discussed. The risks benefits and alternatives to pertinent cardiac procedures and interventions were discussed in detail and all questions were answered. Duration: 15 minutes.            Greater than 90 minutes spent on total encounter; more than 50% of the visit was spent counseling and/or coordinating care by the attending physician.   	  Juan Salcedo MD   Cardiovascular Diseases  [phone]

## 2022-04-23 NOTE — CONSULT NOTE ADULT - SUBJECTIVE AND OBJECTIVE BOX
Patient is a 71y old  Female who presents with a chief complaint of swelling,  redness and blisters legs (2022 17:17)    HPI:  71F hx of Anxiety, Asthma, Breast cancer in situ, COPD not on home O2,. past DVT not presently on A/C, Graves disease, Hyperlipidemia, Hypertension, Hypothyroid, Parathyroidectomy, Kidney cancer, primary, with metastasis from kidney to other site, Left sided partial nephrectomy (no chemo or RT), Obesity, Sleep apnea, prior COVID in 2021, who presents to the for 1 day of bilateral LE/swelling and redness, with blister on right medial ankle region. Wheelchair bound. Denies falls, trauma, fever, chills. Of note, patient has been trying lasix 40mg qd for LE swelling (going on for weeks now) w/o sig improvement. Patient also on pred 5mg qd reportedly for asthma. (2022 17:17)    Of ID note in 2019, pt already had venous stasis, at that time also concerning for cellulitis per team due to leukocytosis.  Currently pt has mild leukocytosis WBC=12, but a year ago in 2021 when pt had COVID, it was also about 12.  Pt is also RVP positive for OC43 Coronavirus, but SARS-CoV-2 negative.       REVIEW OF SYSTEMS  [  ] ROS unobtainable because:    [  ] All other systems negative except as noted below    Constitutional:  [ ] fever [ ] chills  [ ] weight loss  [ ]night sweat  [ ]poor appetite/PO intake [ ]fatigue   Skin:  [ ] rash [ ] phlebitis	  Eyes: [ ] icterus [ ] pain  [ ] discharge	  ENMT: [ ] sore throat  [ ] thrush [ ] ulcers [ ] exudates [ ]anosmia  Respiratory: [ ] dyspnea [ ] hemoptysis [ ] cough [ ] sputum	  Cardiovascular:  [ ] chest pain [ ] palpitations [ ] edema	  Gastrointestinal:  [ ] nausea [ ] vomiting [ ] diarrhea [ ] constipation [ ] pain	  Genitourinary:  [ ] dysuria [ ] frequency [ ] hematuria [ ] discharge [ ] flank pain  [ ] incontinence  Musculoskeletal:  [ ] myalgias [ ] arthralgias [ ] arthritis  [ ] back pain  Neurological:  [ ] headache [ ] weakness [ ] seizures  [ ] confusion/altered mental status    prior hospital charts reviewed [V]  primary team notes reviewed [V]  other consultant notes reviewed [V]    PAST MEDICAL & SURGICAL HISTORY:  Hypertension    Hyperlipidemia    Anxiety    Graves disease    Kidney cancer, primary, with metastasis from kidney to other site, left  LEft sided- s/p nephrectomy only- no chemo or RT    Breast cancer in situ, left    COPD (chronic obstructive pulmonary disease)    Hypothyroid    DVT (deep venous thrombosis)  history of- not presently on A/C    Obesity    Sleep apnea    Asthma    S/P cholecystectomy    S/p nephrectomy  left    S/P breast biopsy  left    History of pelvic surgery  Pelvic Sling    History of eye surgery  7 surgeries secondary to grave&#x27;s disease    History of carpal tunnel release  right wrist    History of parathyroidectomy    S/P laminectomy  now bed and wheelchair ridden with severe pain        SOCIAL HISTORY:  - Denied smoking/vaping/alcohol/recreational drug use    FAMILY HISTORY:  Family history of myocardial infarction  mother  at age 67 and father at age 67 of MI&#x27;s    Family history of hypertension  mother and father    Family history of diabetes mellitus (Sibling)  siblings    Family history of heart disease (Sibling)  brother has a PPM    Family history of thyroid cancer (Child)  daughter has thyroid cancer        Allergies  Grapes (Hives)  No Known Drug Allergies  Peaches (Hives)  shellfish (Hives)        ANTIMICROBIALS:  nafcillin  IVPB 2 every 4 hours      ANTIMICROBIALS (past 90 days):  MEDICATIONS  (STANDING):    nafcillin  IVPB   100 mL/Hr IV Intermittent (22 @ 06:34)   100 mL/Hr IV Intermittent (22 @ 02:22)    piperacillin/tazobactam IVPB...   200 mL/Hr IV Intermittent (22 @ 12:08)        OTHER MEDS:   MEDICATIONS  (STANDING):  acetaminophen     Tablet .. 650 every 6 hours PRN  amLODIPine   Tablet 5 daily  aspirin enteric coated 81 daily  atorvastatin 40 at bedtime  baclofen 10 every 8 hours  busPIRone 10 <User Schedule>  DULoxetine 60 two times a day  enoxaparin Injectable 40 every 24 hours  furosemide    Tablet 40 daily  gabapentin 200 two times a day  hydrALAZINE 25 every 8 hours  levothyroxine 125 daily  losartan 100 daily  melatonin 3 at bedtime  montelukast 10 daily  OLANZapine 20 daily  predniSONE   Tablet 5 daily  tiotropium 18 MICROgram(s) Capsule 1 daily      VITALS:  Vital Signs Last 24 Hrs  T(F): 97.4 (22 @ 05:43), Max: 98.8 (22 @ 15:27)    Vital Signs Last 24 Hrs  HR: 78 (22 @ 05:43) (73 - 86)  BP: 146/76 (22 @ 05:43) (108/71 - 159/77)  RR: 20 (22 @ 05:43)  SpO2: 95% (22 @ 05:43) (94% - 99%)  Wt(kg): --    EXAM:  Physical Exam:  Constitutional:  well preserved, comfortable  Head/Eyes: no icterus, PERRL, EOMI  ENT:  supple; no thrush  LUNGS:  CTA  CVS:  normal S1, S2, no murmur  Abd:  soft, non-tender; non-distended  Ext:  no edema  Vascular:  IV site no erythema tenderness or discharge  MSK:  joints without swelling  Neuro: AAO X 3, non- focal    Labs:                        13.2   12.25 )-----------( 153      ( 2022 06:27 )             43.6         143  |  104  |  16  ----------------------------<  136<H>  4.1   |  27  |  0.73    Ca    9.7      2022 06:27  Mg     2.00         TPro  7.1  /  Alb  3.7  /  TBili  0.7  /  DBili  x   /  AST  13  /  ALT  6   /  AlkPhos  107        MICROBIOLOGY:    Culture - Blood (collected 2021 13:57)  Source: .Blood Blood-Venous  Final Report:    No Growth Final    Culture - Blood (collected 2021 13:57)  Source: .Blood Blood-Peripheral  Final Report:    No Growth Final    Culture - Blood (collected 2021 13:31)  Source: .Blood Blood-Peripheral  Final Report:    No Growth Final    Culture - Blood (collected 2021 13:31)  Source: .Blood Blood-Peripheral  Final Report:    No Growth Final      OC43 Coronavirus (RapRVP): Detected (22 @ 12:33)  SARS-CoV-2: NotDetec (22 @ 12:33)  SARS-CoV-2: Detected(21 @ 10:16)    Procalcitonin, Serum: 0.05 ()    C-Reactive Protein, Serum: 15.8 ()  C-Reactive Protein, Serum: 62.7 mg/L (21 @ 07:30)    Blood Gas Venous - Lactate: 1.6 ( @ 12:25)    RADIOLOGY:  imaging below personally reviewed    < from: US Duplex Venous Lower Ext Complete, Bilateral (22 @ 13:10) >  IMPRESSION: Limited assessment by body habitus  No evidence of deep venous thrombosis in either lower extremity venous segments able to be examined.  < end of copied text >    < from: Xray Chest 1 View- PORTABLE-Urgent (Xray Chest 1 View- PORTABLE-Urgent .) (22 @ 12:33) >  IMPRESSION:  No acute disease.  No significant change as compared to 2021  < end of copied text >    < from: Xray Tibia + Fibula 2 Views, Bilateral (22 @ 12:33) >  IMPRESSION:  Stable intact lateral distal fibular fracture fixation plate with   fixation screws and 3 syndesmotic screws with underlying anatomic   alignment maintained. No dislocations or acute appearing fractures.    Diffuse prominent bilateral soft tissue swelling which could be related   to cellulitis superimposed on lymphedema, correlate clinically. No   tracking soft tissue gas collections, radiopaque foreign bodies, or gross   radiographic evidence for osteomyelitis.    Preserved visualized joint spaces and no joint margin erosions.    Generalized mild osteopenia otherwise no discrete suspicious lytic or   blastic lesions.    --- End of Report ---    < end of copied text >         Patient is a 71y old  Female who presents with a chief complaint of swelling,  redness and blisters legs (2022 17:17)    HPI:  70 yo F with morbid obesity, bilateral chronic venous stasis, DVT in the past but currently not on AC, HTN, HLD, hypothyroidism, parathyroidectomy, anxiety, asthma on prednisone 5mg daily, breast cancer in situ, skin cancer on nose s/p removal, kidney cancer s/p left partial nephrectomy (no chemo or RT), NOEMY, prior COVID-19 infection in 2021, former smoker, COPD not on home O2, IBS with chronic diarrhea, opresents with right leg hematoma and increased bilateral leg swelling and redness.    She denies fever, chills, cough, SOB, sore throat or runny nose. Her  had flu-like symptoms a week ago for which he was prescribed prednisone. She denies injury or fall.    Of ID note in 2019, pt already had venous stasis, at that time also concerning for cellulitis per team due to leukocytosis.  Currently pt has mild leukocytosis WBC=12, but a year ago in 2021 when pt had COVID, it was also about 12.  Pt is also RVP positive for OC43 Coronavirus, but SARS-CoV-2 negative.       REVIEW OF SYSTEMS  [  ] ROS unobtainable because:    [X  ] All other systems negative except as noted below    Constitutional:  [ ] fever [ ] chills  [ ] weight loss  [ ]night sweat  [ ]poor appetite/PO intake [ ]fatigue   Skin:  [ ] rash [ ] phlebitis	  Eyes: [ ] icterus [ ] pain  [ ] discharge	  ENMT: [ ] sore throat  [ ] thrush [ ] ulcers [ ] exudates [ ]anosmia  Respiratory: [ ] dyspnea [ ] hemoptysis [ ] cough [ ] sputum	  Cardiovascular:  [ ] chest pain [ ] palpitations [X ] edema	  Gastrointestinal:  [ ] nausea [ ] vomiting [X ] diarrhea [ ] constipation [ ] pain	  Genitourinary:  [ ] dysuria [ ] frequency [ ] hematuria [ ] discharge [ ] flank pain  [ ] incontinence  Musculoskeletal:  [ ] myalgias [ ] arthralgias [ ] arthritis  [ ] back pain  Neurological:  [ ] headache [ ] weakness [ ] seizures  [ ] confusion/altered mental status    prior hospital charts reviewed [V]  primary team notes reviewed [V]  other consultant notes reviewed [V]    PAST MEDICAL & SURGICAL HISTORY:  Hypertension    Hyperlipidemia    Anxiety    Graves disease    Kidney cancer, primary, with metastasis from kidney to other site, left  LEft sided- s/p nephrectomy only- no chemo or RT    Breast cancer in situ, left    COPD (chronic obstructive pulmonary disease)    Hypothyroid    DVT (deep venous thrombosis)  history of- not presently on A/C    Obesity    Sleep apnea    Asthma    S/P cholecystectomy    S/p nephrectomy  left    S/P breast biopsy  left    History of pelvic surgery  Pelvic Sling    History of eye surgery  7 surgeries secondary to grave&#x27;s disease    History of carpal tunnel release  right wrist    History of parathyroidectomy    S/P laminectomy  now bed and wheelchair ridden with severe pain        SOCIAL HISTORY:  - Denied smoking/vaping/alcohol/recreational drug use  - Former smoker, used to smoke for 20 years but stopped in   - Lives with   - Moderna vaccine*3    FAMILY HISTORY:  Family history of myocardial infarction mother  at age 67 and father at age 67 of MI&#x27;s  Family history of hypertension mother and father  Family history of diabetes mellitus (Sibling)  Family history of heart disease (Sibling) brother has a PPM  Family history of thyroid cancer (Child) daughter has thyroid cancer and uterine cancer    Allergies  Grapes (Hives)  No Known Drug Allergies  Peaches (Hives)  shellfish (Hives)    ANTIMICROBIALS:  nafcillin  IVPB 2 every 4 hours      ANTIMICROBIALS (past 90 days):  MEDICATIONS  (STANDING):    nafcillin  IVPB   100 mL/Hr IV Intermittent (22 @ 06:34)   100 mL/Hr IV Intermittent (22 @ 02:22)    piperacillin/tazobactam IVPB...   200 mL/Hr IV Intermittent (22 @ 12:08)        OTHER MEDS:   MEDICATIONS  (STANDING):  acetaminophen     Tablet .. 650 every 6 hours PRN  amLODIPine   Tablet 5 daily  aspirin enteric coated 81 daily  atorvastatin 40 at bedtime  baclofen 10 every 8 hours  busPIRone 10 <User Schedule>  DULoxetine 60 two times a day  enoxaparin Injectable 40 every 24 hours  furosemide    Tablet 40 daily  gabapentin 200 two times a day  hydrALAZINE 25 every 8 hours  levothyroxine 125 daily  losartan 100 daily  melatonin 3 at bedtime  montelukast 10 daily  OLANZapine 20 daily  predniSONE   Tablet 5 daily  tiotropium 18 MICROgram(s) Capsule 1 daily      VITALS:  Vital Signs Last 24 Hrs  T(F): 97.4 (22 @ 05:43), Max: 98.8 (22 @ 15:27)    Vital Signs Last 24 Hrs  HR: 78 (22 @ 05:43) (73 - 86)  BP: 146/76 (22 @ 05:43) (108/71 - 159/77)  RR: 20 (22 @ 05:43)  SpO2: 95% (22 @ 05:43) (94% - 99%)  Wt(kg): --    EXAM:  Physical Exam:  Constitutional:  well preserved, comfortable, morbid obesity, not in SOB but putting on NC  Head/Eyes: no icterus, PERRL, EOMI  ENT:  supple; no thrush  LUNGS:  CTA  CVS:  normal S1, S2, no murmur  Abd:  soft, non-tender; non-distended  Ext:  bilateral leg edema and redness, favor venous stasis. There is a small erythema nodule on right leg, likely hematoma. Right ankle joint no redness or effusion.  Vascular:  IV site no erythema tenderness or discharge  MSK:  joints without swelling  Neuro: AAO X 3, non- focal    Labs:                        13.2   12.25 )-----------( 153      ( 2022 06:27 )             43.6         143  |  104  |  16  ----------------------------<  136<H>  4.1   |  27  |  0.73    Ca    9.7      2022 06:27  Mg     2.00         TPro  7.1  /  Alb  3.7  /  TBili  0.7  /  DBili  x   /  AST  13  /  ALT  6   /  AlkPhos  107        MICROBIOLOGY:    Culture - Blood (collected 2021 13:57)  Source: .Blood Blood-Venous  Final Report:    No Growth Final    Culture - Blood (collected 2021 13:57)  Source: .Blood Blood-Peripheral  Final Report:    No Growth Final    Culture - Blood (collected 2021 13:31)  Source: .Blood Blood-Peripheral  Final Report:    No Growth Final    Culture - Blood (collected 2021 13:31)  Source: .Blood Blood-Peripheral  Final Report:    No Growth Final      OC43 Coronavirus (RapRVP): Detected (22 @ 12:33)  SARS-CoV-2: NotDetec (22 @ 12:33)  SARS-CoV-2: Detected(21 @ 10:16)    Procalcitonin, Serum: 0.05 ()    C-Reactive Protein, Serum: 15.8 ()  C-Reactive Protein, Serum: 62.7 mg/L (21 @ 07:30)    Blood Gas Venous - Lactate: 1.6 ( @ 12:25)    RADIOLOGY:  imaging below personally reviewed    < from: US Duplex Venous Lower Ext Complete, Bilateral (22 @ 13:10) >  IMPRESSION: Limited assessment by body habitus  No evidence of deep venous thrombosis in either lower extremity venous segments able to be examined.  < end of copied text >    < from: Xray Chest 1 View- PORTABLE-Urgent (Xray Chest 1 View- PORTABLE-Urgent .) (22 @ 12:33) >  IMPRESSION:  No acute disease.  No significant change as compared to 2021  < end of copied text >    < from: Xray Tibia + Fibula 2 Views, Bilateral (22 @ 12:33) >  IMPRESSION:  Stable intact lateral distal fibular fracture fixation plate with   fixation screws and 3 syndesmotic screws with underlying anatomic   alignment maintained. No dislocations or acute appearing fractures.    Diffuse prominent bilateral soft tissue swelling which could be related   to cellulitis superimposed on lymphedema, correlate clinically. No   tracking soft tissue gas collections, radiopaque foreign bodies, or gross   radiographic evidence for osteomyelitis.    Preserved visualized joint spaces and no joint margin erosions.    Generalized mild osteopenia otherwise no discrete suspicious lytic or   blastic lesions.    --- End of Report ---    < end of copied text >

## 2022-04-23 NOTE — CONSULT NOTE ADULT - ASSESSMENT
71F hx of Anxiety, Asthma, Breast cancer in situ, COPD not on home O2, past DVT not presently on A/C, Graves disease, Hyperlipidemia, Hypertension, Hypothyroid, Parathyroidectomy, Kidney cancer, primary, with metastasis from kidney to other site, Left sided partial nephrectomy (no chemo or RT), Obesity, Sleep apnea, prior COVID in 01/2021, who presents to the for 1 day of bilateral LE/swelling and redness, with blister on right medial ankle region. Wheelchair bound.   Of ID note in 12/2019, pt already had venous stasis, at that time also concerning for cellulitis per team due to leukocytosis.  Currently pt has mild leukocytosis WBC=12, but a year ago in 01/2021 when pt had COVID, it was also about 12.  Pt is also RVP positive for OC43 Coronavirus, but SARS-CoV-2 negative. CXR no change over a year.  DVT negative.    #Bilateral leg swelling  - Favor venous stasis    #OC43 Coronavirus positive  - Common old virus, not COVID-19  - Supportive care    Barak Campbell MD, AAHIVS, PGY5  ID fellow  Please reach out on Teams  [facility] pager ID: [phone] (would prefer to text page for any new consult or question, please include name/location and best call back number)  After 5pm/weekends call ID office directly     72 yo F with morbid obesity, bilateral chronic venous stasis, DVT in the past but currently not on AC, HTN, HLD, hypothyroidism, parathyroidectomy, anxiety, asthma on prednisone 5mg daily, breast cancer in situ, skin cancer on nose s/p removal, kidney cancer s/p left partial nephrectomy (no chemo or RT), NOEMY, prior COVID-19 infection in 01/2021, former smoker, COPD not on home O2, IBS with chronic diarrhea, opresents with right leg hematoma and increased bilateral leg swelling and redness.    She denies fever, chills, cough, SOB, sore throat or runny nose. Her  had flu-like symptoms a week ago for which he was prescribed prednisone. She denies injury or fall.  Pt is also RVP positive for OC43 Coronavirus, but SARS-CoV-2 negative.  Of ID note in 12/2019, pt already had venous stasis, at that time also concerning for cellulitis per team due to leukocytosis.  Currently pt has mild leukocytosis WBC=12, but a year ago in 01/2021 when pt had COVID, it was also about 12.  Pt is also RVP positive for OC43 Coronavirus, but SARS-CoV-2 negative. CXR no change over a year.  DVT negative.    #Bilateral leg swelling  - Favor venous stasis  - There is a hematoma on right leg, not concerning for infection  - Would DC nafcillin, risk > benefit with antiobitics    #OC43 Coronavirus positive  - Common old virus, not COVID-19  - Supportive care    Barak Campbell MD, AAHIVS, PGY5  ID fellow  Please reach out on Teams  [facility] pager ID: [phone] (would prefer to text page for any new consult or question, please include name/location and best call back number)  After 5pm/weekends call ID office directly     72 yo F with morbid obesity, bilateral chronic venous stasis, DVT in the past but currently not on AC, HTN, HLD, hypothyroidism, parathyroidectomy, anxiety, asthma on prednisone 5mg daily, breast cancer in situ, skin cancer on nose s/p removal, kidney cancer s/p left partial nephrectomy (no chemo or RT), NOEMY, prior COVID-19 infection in 01/2021, former smoker, COPD not on home O2, IBS with chronic diarrhea, opresents with right leg hematoma and increased bilateral leg swelling and redness.    She denies fever, chills, cough, SOB, sore throat or runny nose. Her  had flu-like symptoms a week ago for which he was prescribed prednisone. She denies injury or fall.  Pt is also RVP positive for OC43 Coronavirus, but SARS-CoV-2 negative.  Of ID note in 12/2019, pt already had venous stasis, at that time also concerning for cellulitis per team due to leukocytosis.  Currently pt has mild leukocytosis WBC=12, but a year ago in 01/2021 when pt had COVID, it was also about 12.  Pt is also RVP positive for OC43 Coronavirus, but SARS-CoV-2 negative. CXR no change over a year.  DVT negative.    #Bilateral leg swelling  - Favor venous stasis  - There is a hematoma on right leg, not concerning for infection  - Would DC nafcillin, risk > benefit with antiobitics    #OC43 Coronavirus positive  - Common old virus, not COVID-19  - Supportive care    Barak Campbell MD, AAHIVS, PGY5  ID fellow  Please reach out on Teams  [facility] pager ID: [phone] (would prefer to text page for any new consult or question, please include name/location and best call back number)  After 5pm/weekends call ID office directly    d/w Dr. Fabi Arellano conveyed to primary team

## 2022-04-23 NOTE — PATIENT PROFILE ADULT - FALL HARM RISK - HARM RISK INTERVENTIONS

## 2022-04-24 LAB
ANION GAP SERPL CALC-SCNC: 12 MMOL/L — SIGNIFICANT CHANGE UP (ref 7–14)
BUN SERPL-MCNC: 14 MG/DL — SIGNIFICANT CHANGE UP (ref 7–23)
CALCIUM SERPL-MCNC: 9.4 MG/DL — SIGNIFICANT CHANGE UP (ref 8.4–10.5)
CHLORIDE SERPL-SCNC: 103 MMOL/L — SIGNIFICANT CHANGE UP (ref 98–107)
CO2 SERPL-SCNC: 26 MMOL/L — SIGNIFICANT CHANGE UP (ref 22–31)
CREAT SERPL-MCNC: 0.63 MG/DL — SIGNIFICANT CHANGE UP (ref 0.5–1.3)
EGFR: 94 ML/MIN/1.73M2 — SIGNIFICANT CHANGE UP
GLUCOSE SERPL-MCNC: 89 MG/DL — SIGNIFICANT CHANGE UP (ref 70–99)
HCT VFR BLD CALC: 40.7 % — SIGNIFICANT CHANGE UP (ref 34.5–45)
HGB BLD-MCNC: 12.7 G/DL — SIGNIFICANT CHANGE UP (ref 11.5–15.5)
MAGNESIUM SERPL-MCNC: 2.1 MG/DL — SIGNIFICANT CHANGE UP (ref 1.6–2.6)
MCHC RBC-ENTMCNC: 30.5 PG — SIGNIFICANT CHANGE UP (ref 27–34)
MCHC RBC-ENTMCNC: 31.2 GM/DL — LOW (ref 32–36)
MCV RBC AUTO: 97.8 FL — SIGNIFICANT CHANGE UP (ref 80–100)
NRBC # BLD: 0 /100 WBCS — SIGNIFICANT CHANGE UP
NRBC # FLD: 0 K/UL — SIGNIFICANT CHANGE UP
PHOSPHATE SERPL-MCNC: 2.7 MG/DL — SIGNIFICANT CHANGE UP (ref 2.5–4.5)
PLATELET # BLD AUTO: 133 K/UL — LOW (ref 150–400)
POTASSIUM SERPL-MCNC: 3.9 MMOL/L — SIGNIFICANT CHANGE UP (ref 3.5–5.3)
POTASSIUM SERPL-SCNC: 3.9 MMOL/L — SIGNIFICANT CHANGE UP (ref 3.5–5.3)
RBC # BLD: 4.16 M/UL — SIGNIFICANT CHANGE UP (ref 3.8–5.2)
RBC # FLD: 15.1 % — HIGH (ref 10.3–14.5)
SODIUM SERPL-SCNC: 141 MMOL/L — SIGNIFICANT CHANGE UP (ref 135–145)
WBC # BLD: 7.92 K/UL — SIGNIFICANT CHANGE UP (ref 3.8–10.5)
WBC # FLD AUTO: 7.92 K/UL — SIGNIFICANT CHANGE UP (ref 3.8–10.5)

## 2022-04-24 PROCEDURE — 99232 SBSQ HOSP IP/OBS MODERATE 35: CPT

## 2022-04-24 PROCEDURE — 93306 TTE W/DOPPLER COMPLETE: CPT | Mod: 26

## 2022-04-24 PROCEDURE — 99221 1ST HOSP IP/OBS SF/LOW 40: CPT

## 2022-04-24 RX ADMIN — Medication 125 MICROGRAM(S): at 05:37

## 2022-04-24 RX ADMIN — Medication 3 MILLIGRAM(S): at 22:01

## 2022-04-24 RX ADMIN — TIOTROPIUM BROMIDE 1 CAPSULE(S): 18 CAPSULE ORAL; RESPIRATORY (INHALATION) at 09:29

## 2022-04-24 RX ADMIN — Medication 81 MILLIGRAM(S): at 14:16

## 2022-04-24 RX ADMIN — GABAPENTIN 200 MILLIGRAM(S): 400 CAPSULE ORAL at 05:37

## 2022-04-24 RX ADMIN — Medication 25 MILLIGRAM(S): at 22:01

## 2022-04-24 RX ADMIN — Medication 10 MILLIGRAM(S): at 05:36

## 2022-04-24 RX ADMIN — LOSARTAN POTASSIUM 100 MILLIGRAM(S): 100 TABLET, FILM COATED ORAL at 05:36

## 2022-04-24 RX ADMIN — Medication 25 MILLIGRAM(S): at 05:36

## 2022-04-24 RX ADMIN — Medication 5 MILLIGRAM(S): at 05:37

## 2022-04-24 RX ADMIN — Medication 25 MILLIGRAM(S): at 14:16

## 2022-04-24 RX ADMIN — MONTELUKAST 10 MILLIGRAM(S): 4 TABLET, CHEWABLE ORAL at 14:16

## 2022-04-24 RX ADMIN — OLANZAPINE 20 MILLIGRAM(S): 15 TABLET, FILM COATED ORAL at 14:15

## 2022-04-24 RX ADMIN — GABAPENTIN 200 MILLIGRAM(S): 400 CAPSULE ORAL at 17:28

## 2022-04-24 RX ADMIN — AMLODIPINE BESYLATE 5 MILLIGRAM(S): 2.5 TABLET ORAL at 05:37

## 2022-04-24 RX ADMIN — ATORVASTATIN CALCIUM 40 MILLIGRAM(S): 80 TABLET, FILM COATED ORAL at 22:01

## 2022-04-24 RX ADMIN — DULOXETINE HYDROCHLORIDE 60 MILLIGRAM(S): 30 CAPSULE, DELAYED RELEASE ORAL at 17:28

## 2022-04-24 RX ADMIN — Medication 40 MILLIGRAM(S): at 05:36

## 2022-04-24 RX ADMIN — Medication 10 MILLIGRAM(S): at 22:01

## 2022-04-24 RX ADMIN — DULOXETINE HYDROCHLORIDE 60 MILLIGRAM(S): 30 CAPSULE, DELAYED RELEASE ORAL at 05:37

## 2022-04-24 RX ADMIN — ENOXAPARIN SODIUM 40 MILLIGRAM(S): 100 INJECTION SUBCUTANEOUS at 14:15

## 2022-04-24 RX ADMIN — Medication 10 MILLIGRAM(S): at 17:59

## 2022-04-24 RX ADMIN — Medication 10 MILLIGRAM(S): at 14:16

## 2022-04-24 NOTE — CONSULT NOTE ADULT - ASSESSMENT
71F hx of Anxiety, Asthma, Breast cancer in situ, COPD not on home O2, past DVT not presently on A/C, Graves disease, Hyperlipidemia, Hypertension, Hypothyroid, Parathyroidectomy, Kidney cancer, primary, with metastasis from kidney to other site, Left sided partial nephrectomy (no chemo or RT), Obesity, Sleep apnea, prior COVID, who presents with worsening b/l le swelling getting cardiac work up.       PLAN:   - No surgical intervention  - Venous insufficiency, chronic  - B/l LE wrapped and elevated  - Plan discussed with Fellow, Dr. Alcides Iglesias MD, PGY 3  C Team Surgery  [id number]

## 2022-04-24 NOTE — CONSULT NOTE ADULT - SUBJECTIVE AND OBJECTIVE BOX
VASCULAR SURGERY CONSULT NOTE  --------------------------------------------------------------------------------------------  HPI:  71F hx of Anxiety, Asthma, Breast cancer in situ, COPD not on home O2, past DVT not presently on A/C, Graves disease, Hyperlipidemia, Hypertension, Hypothyroid, Parathyroidectomy, Kidney cancer, primary, with metastasis from kidney to other site, Left sided partial nephrectomy (no chemo or RT), Obesity, Sleep apnea, prior COVID, who presents to the for 1 day of bilateral LE swelling and redness, with blister on right medial ankle region. Wheelchair bound. Denies falls, trauma, fever, chills. Of note, patient has been trying lasix 40mg qd for LE swelling (going on for weeks now) w/o sig improvement. Patient also on pred 5mg qd reportedly for asthma. LE DVT u/s negative, Echo EF 60-65%. Patient reports pain and swelling improved since admission. Uses wheelchair at home.     PAST MEDICAL & SURGICAL HISTORY:  Hypertension  Hyperlipidemia  Anxiety  Graves disease  Kidney cancer, primary, with metastasis from kidney to other site, left  LEft sided- s/p nephrectomy only- no chemo or RT  Breast cancer in situ, left  COPD (chronic obstructive pulmonary disease)  Hypothyroid  DVT (deep venous thrombosis)  history of- not presently on A/C  Obesity  Sleep apnea  Asthma  S/P cholecystectomy  S/p nephrectomy  left  S/P breast biopsy  left  History of pelvic surgery  Pelvic Sling  History of eye surgery  7 surgeries secondary to grave&#x27;s disease  History of carpal tunnel release  right wrist  History of parathyroidectomy  S/P laminectomy  now bed and wheelchair ridden with severe pain      FAMILY HISTORY:  Family history of myocardial infarction  mother  at age 67 and father at age 67 of MI&#x27;s    Family history of hypertension  mother and father    Family history of diabetes mellitus (Sibling)  siblings    Family history of heart disease (Sibling)  brother has a PPM    Family history of thyroid cancer (Child)  daughter has thyroid cancer    [] Family history not pertinent as reviewed with the patient and family    SOCIAL HISTORY:     ALLERGIES: Grapes (Hives)  No Known Drug Allergies  Peaches (Hives)  shellfish (Hives)    HOME MEDICATIONS:     CURRENT MEDICATIONS  MEDICATIONS (STANDING): amLODIPine   Tablet 5 milliGRAM(s) Oral daily  aspirin enteric coated 81 milliGRAM(s) Oral daily  atorvastatin 40 milliGRAM(s) Oral at bedtime  baclofen 10 milliGRAM(s) Oral every 8 hours  busPIRone 10 milliGRAM(s) Oral <User Schedule>  DULoxetine 60 milliGRAM(s) Oral two times a day  enoxaparin Injectable 40 milliGRAM(s) SubCutaneous every 24 hours  furosemide    Tablet 40 milliGRAM(s) Oral daily  gabapentin 200 milliGRAM(s) Oral two times a day  hydrALAZINE 25 milliGRAM(s) Oral every 8 hours  levothyroxine 125 MICROGram(s) Oral daily  losartan 100 milliGRAM(s) Oral daily  melatonin 3 milliGRAM(s) Oral at bedtime  montelukast 10 milliGRAM(s) Oral daily  OLANZapine 20 milliGRAM(s) Oral daily  predniSONE   Tablet 5 milliGRAM(s) Oral daily  tiotropium 18 MICROgram(s) Capsule 1 Capsule(s) Inhalation daily    MEDICATIONS (PRN):acetaminophen     Tablet .. 650 milliGRAM(s) Oral every 6 hours PRN Temp greater or equal to 38.5C (101.3F), Moderate Pain (4 - 6)    --------------------------------------------------------------------------------------------    Vitals:   T(C): 36.9 (22 @ 17:22), Max: 36.9 (22 @ 17:22)  HR: 93 (22 @ 17:22) (66 - 93)  BP: 137/72 (22 @ 17:22) (126/68 - 146/59)  RR: 17 (22 @ 17:22) (17 - 18)  SpO2: 95% (22 @ 17:22) (95% - 100%)  CAPILLARY BLOOD GLUCOSE    Height (cm): 152.4 ( @ 10:49)    PHYSICAL EXAM:   General: NAD, Lying in bed comfortably  Neuro: Alert and answering questions appropriately   HEENT: Grossly normal, EOMI  Cardio: b/l LE pitting edema  Resp: Good effort, no signs of respiratory distress  Vascular: palpable b/l DP/PT. Hemorrhagic blister on right anterior leg. mild erythema  Musculoskeletal: All 4 extremities moving spontaneously, no limitations  --------------------------------------------------------------------------------------------    LABS  CBC ( @ 08:10)                              12.7                           7.92    )----------------(  133<L>     --    % Neutrophils, --    % Lymphocytes, ANC: --                                  40.7      BMP ( @ 08:10)             141     |  103     |  14    		Ca++ --      Ca 9.4                ---------------------------------( 89    		Mg 2.10               3.9     |  26      |  0.63  			Ph 2.7                 --------------------------------------------------------------------------------------------    MICROBIOLOGY    -> .Blood Blood-Peripheral Culture ( @ 18:33)     NG    NG    No growth to date.      --------------------------------------------------------------------------------------------    IMAGING  < from: US Duplex Venous Lower Ext Complete, Bilateral (22 @ 13:10) >  IMPRESSION: Limited assessment by body habitus    No evidence of deep venous thrombosis in either lower extremity venous   segments able to be examined.        < end of copied text >    --------------------------------------------------------------------------------------------

## 2022-04-24 NOTE — CONSULT NOTE ADULT - ATTENDING COMMENTS
LAUREL Angel MD reviewed the resident note and agree with the findings and plan
71F with LE edema, coronavirus infection/URI, leg hematoma   -no s/s of active bacterial infection  -no clear cut cellulitis or abscess on legs  -DC abx and observe  -supportive care for URI/coronavirus infection   -limb elevation PRN     Cmailo Dunlap  Attending Physician   Division of Infectious Disease  Office #[phone]  Available on Microsoft Teams also  After 5pm/weekend or no response, call #[phone]    D/w

## 2022-04-25 ENCOUNTER — TRANSCRIPTION ENCOUNTER (OUTPATIENT)
Age: 72
End: 2022-04-25

## 2022-04-25 VITALS
HEART RATE: 89 BPM | OXYGEN SATURATION: 93 % | RESPIRATION RATE: 20 BRPM | TEMPERATURE: 99 F | SYSTOLIC BLOOD PRESSURE: 157 MMHG | DIASTOLIC BLOOD PRESSURE: 70 MMHG

## 2022-04-25 DIAGNOSIS — I83.893 VARICOSE VEINS OF BILATERAL LOWER EXTREMITIES WITH OTHER COMPLICATIONS: ICD-10-CM

## 2022-04-25 DIAGNOSIS — I87.2 VENOUS INSUFFICIENCY (CHRONIC) (PERIPHERAL): ICD-10-CM

## 2022-04-25 LAB
ANION GAP SERPL CALC-SCNC: 11 MMOL/L — SIGNIFICANT CHANGE UP (ref 7–14)
BUN SERPL-MCNC: 14 MG/DL — SIGNIFICANT CHANGE UP (ref 7–23)
CALCIUM SERPL-MCNC: 9.3 MG/DL — SIGNIFICANT CHANGE UP (ref 8.4–10.5)
CHLORIDE SERPL-SCNC: 105 MMOL/L — SIGNIFICANT CHANGE UP (ref 98–107)
CO2 SERPL-SCNC: 29 MMOL/L — SIGNIFICANT CHANGE UP (ref 22–31)
CREAT SERPL-MCNC: 0.6 MG/DL — SIGNIFICANT CHANGE UP (ref 0.5–1.3)
EGFR: 95 ML/MIN/1.73M2 — SIGNIFICANT CHANGE UP
GLUCOSE SERPL-MCNC: 75 MG/DL — SIGNIFICANT CHANGE UP (ref 70–99)
HCT VFR BLD CALC: 38.6 % — SIGNIFICANT CHANGE UP (ref 34.5–45)
HGB BLD-MCNC: 12.2 G/DL — SIGNIFICANT CHANGE UP (ref 11.5–15.5)
MAGNESIUM SERPL-MCNC: 1.9 MG/DL — SIGNIFICANT CHANGE UP (ref 1.6–2.6)
MCHC RBC-ENTMCNC: 30.8 PG — SIGNIFICANT CHANGE UP (ref 27–34)
MCHC RBC-ENTMCNC: 31.6 GM/DL — LOW (ref 32–36)
MCV RBC AUTO: 97.5 FL — SIGNIFICANT CHANGE UP (ref 80–100)
NRBC # BLD: 0 /100 WBCS — SIGNIFICANT CHANGE UP
NRBC # FLD: 0 K/UL — SIGNIFICANT CHANGE UP
PHOSPHATE SERPL-MCNC: 2.1 MG/DL — LOW (ref 2.5–4.5)
PLATELET # BLD AUTO: 145 K/UL — LOW (ref 150–400)
POTASSIUM SERPL-MCNC: 3.9 MMOL/L — SIGNIFICANT CHANGE UP (ref 3.5–5.3)
POTASSIUM SERPL-SCNC: 3.9 MMOL/L — SIGNIFICANT CHANGE UP (ref 3.5–5.3)
RBC # BLD: 3.96 M/UL — SIGNIFICANT CHANGE UP (ref 3.8–5.2)
RBC # FLD: 14.7 % — HIGH (ref 10.3–14.5)
SODIUM SERPL-SCNC: 145 MMOL/L — SIGNIFICANT CHANGE UP (ref 135–145)
WBC # BLD: 6.55 K/UL — SIGNIFICANT CHANGE UP (ref 3.8–10.5)
WBC # FLD AUTO: 6.55 K/UL — SIGNIFICANT CHANGE UP (ref 3.8–10.5)

## 2022-04-25 PROCEDURE — 99222 1ST HOSP IP/OBS MODERATE 55: CPT

## 2022-04-25 RX ORDER — LANOLIN ALCOHOL/MO/W.PET/CERES
1 CREAM (GRAM) TOPICAL
Qty: 0 | Refills: 0 | DISCHARGE
Start: 2022-04-25

## 2022-04-25 RX ORDER — SODIUM,POTASSIUM PHOSPHATES 278-250MG
1 POWDER IN PACKET (EA) ORAL ONCE
Refills: 0 | Status: COMPLETED | OUTPATIENT
Start: 2022-04-25 | End: 2022-04-25

## 2022-04-25 RX ORDER — LOPERAMIDE HCL 2 MG
1 TABLET ORAL
Qty: 0 | Refills: 0 | DISCHARGE

## 2022-04-25 RX ORDER — GABAPENTIN 400 MG/1
1 CAPSULE ORAL
Qty: 0 | Refills: 0 | DISCHARGE
Start: 2022-04-25

## 2022-04-25 RX ORDER — GABAPENTIN 400 MG/1
2 CAPSULE ORAL
Qty: 0 | Refills: 0 | DISCHARGE
Start: 2022-04-25

## 2022-04-25 RX ORDER — GABAPENTIN 400 MG/1
2 CAPSULE ORAL
Qty: 0 | Refills: 0 | DISCHARGE

## 2022-04-25 RX ADMIN — Medication 5 MILLIGRAM(S): at 05:49

## 2022-04-25 RX ADMIN — Medication 25 MILLIGRAM(S): at 14:53

## 2022-04-25 RX ADMIN — DULOXETINE HYDROCHLORIDE 60 MILLIGRAM(S): 30 CAPSULE, DELAYED RELEASE ORAL at 05:49

## 2022-04-25 RX ADMIN — Medication 10 MILLIGRAM(S): at 05:49

## 2022-04-25 RX ADMIN — Medication 81 MILLIGRAM(S): at 14:51

## 2022-04-25 RX ADMIN — Medication 1 PACKET(S): at 14:52

## 2022-04-25 RX ADMIN — GABAPENTIN 200 MILLIGRAM(S): 400 CAPSULE ORAL at 17:37

## 2022-04-25 RX ADMIN — Medication 40 MILLIGRAM(S): at 05:49

## 2022-04-25 RX ADMIN — AMLODIPINE BESYLATE 5 MILLIGRAM(S): 2.5 TABLET ORAL at 05:49

## 2022-04-25 RX ADMIN — Medication 10 MILLIGRAM(S): at 14:53

## 2022-04-25 RX ADMIN — Medication 125 MICROGRAM(S): at 05:49

## 2022-04-25 RX ADMIN — Medication 10 MILLIGRAM(S): at 17:37

## 2022-04-25 RX ADMIN — DULOXETINE HYDROCHLORIDE 60 MILLIGRAM(S): 30 CAPSULE, DELAYED RELEASE ORAL at 17:37

## 2022-04-25 RX ADMIN — ENOXAPARIN SODIUM 40 MILLIGRAM(S): 100 INJECTION SUBCUTANEOUS at 14:52

## 2022-04-25 RX ADMIN — MONTELUKAST 10 MILLIGRAM(S): 4 TABLET, CHEWABLE ORAL at 14:53

## 2022-04-25 RX ADMIN — GABAPENTIN 200 MILLIGRAM(S): 400 CAPSULE ORAL at 05:49

## 2022-04-25 RX ADMIN — Medication 25 MILLIGRAM(S): at 05:49

## 2022-04-25 RX ADMIN — OLANZAPINE 20 MILLIGRAM(S): 15 TABLET, FILM COATED ORAL at 14:52

## 2022-04-25 RX ADMIN — LOSARTAN POTASSIUM 100 MILLIGRAM(S): 100 TABLET, FILM COATED ORAL at 05:49

## 2022-04-25 RX ADMIN — TIOTROPIUM BROMIDE 1 CAPSULE(S): 18 CAPSULE ORAL; RESPIRATORY (INHALATION) at 14:53

## 2022-04-25 NOTE — DISCHARGE NOTE NURSING/CASE MANAGEMENT/SOCIAL WORK - PATIENT PORTAL LINK FT
You can access the FollowMyHealth Patient Portal offered by [facility] by registering at the following website: [email or web address]. By joining [facility]’s FollowMyHealth portal, you will also be able to view your health information using other applications (apps) compatible with our system.

## 2022-04-25 NOTE — DISCHARGE NOTE NURSING/CASE MANAGEMENT/SOCIAL WORK - NSDCVIVACCINE_GEN_ALL_CORE_FT
Tdap; 19-Nov-2019 16:31; Christine Skaggs (RN); [employer]; [id number] (Exp. Date: 17-Sep-2021); IntraMuscular; Deltoid Left.; 0.5 milliLiter(s); VIS (VIS Published: 09-May-2013, VIS Presented: 19-Nov-2019);

## 2022-04-25 NOTE — PROGRESS NOTE ADULT - SUBJECTIVE AND OBJECTIVE BOX
Cardiovascular Disease Progress Note    Overnight events: No acute events overnight.  edema improving. no cp/sob/palps/dizziness  Otherwise review of systems negative    Objective Findings:  T(C): 36.8 (04-25-22 @ 05:22), Max: 37.1 (04-24-22 @ 22:34)  HR: 69 (04-25-22 @ 05:22) (69 - 94)  BP: 149/63 (04-25-22 @ 05:22) (137/62 - 153/65)  RR: 18 (04-25-22 @ 05:22) (17 - 18)  SpO2: 98% (04-25-22 @ 05:22) (95% - 98%)  Wt(kg): --  Daily     Daily       Physical Exam:  Gen: NAD  HEENT: EOMI  CV: RRR, normal S1 + S2, no m/r/g  Lungs: CTAB  Abd: soft, non-tender  Ext: + edema    Telemetry:    Laboratory Data:                        12.7   7.92  )-----------( 133      ( 24 Apr 2022 08:10 )             40.7     04-24    141  |  103  |  14  ----------------------------<  89  3.9   |  26  |  0.63    Ca    9.4      24 Apr 2022 08:10  Phos  2.7     04-24  Mg     2.10     04-24                Inpatient Medications:  MEDICATIONS  (STANDING):  amLODIPine   Tablet 5 milliGRAM(s) Oral daily  aspirin enteric coated 81 milliGRAM(s) Oral daily  atorvastatin 40 milliGRAM(s) Oral at bedtime  baclofen 10 milliGRAM(s) Oral every 8 hours  busPIRone 10 milliGRAM(s) Oral <User Schedule>  DULoxetine 60 milliGRAM(s) Oral two times a day  enoxaparin Injectable 40 milliGRAM(s) SubCutaneous every 24 hours  furosemide    Tablet 40 milliGRAM(s) Oral daily  gabapentin 200 milliGRAM(s) Oral two times a day  hydrALAZINE 25 milliGRAM(s) Oral every 8 hours  levothyroxine 125 MICROGram(s) Oral daily  losartan 100 milliGRAM(s) Oral daily  melatonin 3 milliGRAM(s) Oral at bedtime  montelukast 10 milliGRAM(s) Oral daily  OLANZapine 20 milliGRAM(s) Oral daily  predniSONE   Tablet 5 milliGRAM(s) Oral daily  tiotropium 18 MICROgram(s) Capsule 1 Capsule(s) Inhalation daily      Assessment:  lower ext edema  htn  venous insufficiency  melba  copd  anxiety    Recs:  cv status appears stable  lower ext edema without signs of chf. suspect venous insufficiency. le giovanni duplex neg dvt. cw lasix 40mg po qd. check bnp. check 2d echo  id consult appreciated. monitor off abx for now  vascular recs appreciated. cw leg elevation and compression stockings  cw antihypertensives  will follow      Over 25 minutes spent on total encounter; more than 50% of the visit was spent counseling and/or coordinating care by the attending physician.      Juan Salcedo MD   Cardiovascular Disease  [phone]
Date of Service  : 04-23-22    INTERVAL HPI/OVERNIGHT EVENTS: I feel better.   Vital Signs Last 24 Hrs  T(C): 36.5 (23 Apr 2022 10:53), Max: 37.1 (22 Apr 2022 15:27)  T(F): 97.7 (23 Apr 2022 10:53), Max: 98.8 (22 Apr 2022 15:27)  HR: 67 (23 Apr 2022 14:03) (67 - 80)  BP: 151/75 (23 Apr 2022 14:03) (131/62 - 159/77)  BP(mean): 90 (22 Apr 2022 17:17) (90 - 90)  RR: 18 (23 Apr 2022 10:53) (16 - 20)  SpO2: 96% (23 Apr 2022 10:53) (95% - 99%)  I&O's Summary    MEDICATIONS  (STANDING):  amLODIPine   Tablet 5 milliGRAM(s) Oral daily  aspirin enteric coated 81 milliGRAM(s) Oral daily  atorvastatin 40 milliGRAM(s) Oral at bedtime  baclofen 10 milliGRAM(s) Oral every 8 hours  busPIRone 10 milliGRAM(s) Oral <User Schedule>  DULoxetine 60 milliGRAM(s) Oral two times a day  enoxaparin Injectable 40 milliGRAM(s) SubCutaneous every 24 hours  furosemide    Tablet 40 milliGRAM(s) Oral daily  gabapentin 200 milliGRAM(s) Oral two times a day  hydrALAZINE 25 milliGRAM(s) Oral every 8 hours  levothyroxine 125 MICROGram(s) Oral daily  losartan 100 milliGRAM(s) Oral daily  melatonin 3 milliGRAM(s) Oral at bedtime  montelukast 10 milliGRAM(s) Oral daily  OLANZapine 20 milliGRAM(s) Oral daily  predniSONE   Tablet 5 milliGRAM(s) Oral daily  tiotropium 18 MICROgram(s) Capsule 1 Capsule(s) Inhalation daily    MEDICATIONS  (PRN):  acetaminophen     Tablet .. 650 milliGRAM(s) Oral every 6 hours PRN Temp greater or equal to 38.5C (101.3F), Moderate Pain (4 - 6)    LABS:                        13.2   12.25 )-----------( 153      ( 23 Apr 2022 06:27 )             43.6     04-23    143  |  104  |  16  ----------------------------<  136<H>  4.1   |  27  |  0.73    Ca    9.7      23 Apr 2022 06:27  Mg     2.00     04-22    TPro  7.1  /  Alb  3.7  /  TBili  0.7  /  DBili  x   /  AST  13  /  ALT  6   /  AlkPhos  107  04-22    PT/INR - ( 22 Apr 2022 12:25 )   PT: 12.3 sec;   INR: 1.06 ratio         PTT - ( 22 Apr 2022 12:25 )  PTT:58.7 sec    CAPILLARY BLOOD GLUCOSE              REVIEW OF SYSTEMS:  CONSTITUTIONAL: No fever, weight loss, or fatigue  EYES: No eye pain, visual disturbances, or discharge  ENMT:  No difficulty hearing, tinnitus, vertigo; No sinus or throat pain  NECK: No pain or stiffness  RESPIRATORY: No cough, wheezing, chills or hemoptysis; No shortness of breath  CARDIOVASCULAR: No chest pain, palpitations, dizziness, or leg swelling  GASTROINTESTINAL: No abdominal or epigastric pain. No nausea, vomiting, or hematemesis; No diarrhea or constipation. No melena or hematochezia.  GENITOURINARY: No dysuria, frequency, hematuria, or incontinence  NEUROLOGICAL: No headaches, memory loss, loss of strength, numbness, or tremors    Consultant(s) Notes Reviewed:  [x ] YES  [ ] NO    PHYSICAL EXAM:  GENERAL: NAD, well-groomed, well-developed,not in any distress ,  HEAD:  Atraumatic, Normocephalic  NECK: Supple, No JVD, Normal thyroid  NERVOUS SYSTEM:  Alert & Oriented X3, No focal deficit   CHEST/LUNG: Good air entry bilateral with no  rales, rhonchi, wheezing, or rubs  HEART: Regular rate and rhythm; No murmurs, rubs, or gallops  ABDOMEN: Soft, Nontender, Nondistended; Bowel sounds present  EXTREMITIES:  swelling ,erythema and blisters + but less     Care Discussed with Consultants/Other Providers [ x] YES  [ ] NO
SHEKHAR VASQUEZ 71y [id number]    Patient is a 71y old  Female who presents with a chief complaint of swelling,  redness and blisters legs (23 Apr 2022 13:08)      Follow Up/CC:  ID following for leg swelling    Interval History/ROS: no fever, feels better    Allergies    Grapes (Hives)  No Known Drug Allergies  Peaches (Hives)  shellfish (Hives)    Intolerances        ANTIMICROBIALS:      MEDICATIONS  (STANDING):  amLODIPine   Tablet 5 milliGRAM(s) Oral daily  aspirin enteric coated 81 milliGRAM(s) Oral daily  atorvastatin 40 milliGRAM(s) Oral at bedtime  baclofen 10 milliGRAM(s) Oral every 8 hours  busPIRone 10 milliGRAM(s) Oral <User Schedule>  DULoxetine 60 milliGRAM(s) Oral two times a day  enoxaparin Injectable 40 milliGRAM(s) SubCutaneous every 24 hours  furosemide    Tablet 40 milliGRAM(s) Oral daily  gabapentin 200 milliGRAM(s) Oral two times a day  hydrALAZINE 25 milliGRAM(s) Oral every 8 hours  levothyroxine 125 MICROGram(s) Oral daily  losartan 100 milliGRAM(s) Oral daily  melatonin 3 milliGRAM(s) Oral at bedtime  montelukast 10 milliGRAM(s) Oral daily  OLANZapine 20 milliGRAM(s) Oral daily  predniSONE   Tablet 5 milliGRAM(s) Oral daily  tiotropium 18 MICROgram(s) Capsule 1 Capsule(s) Inhalation daily    MEDICATIONS  (PRN):  acetaminophen     Tablet .. 650 milliGRAM(s) Oral every 6 hours PRN Temp greater or equal to 38.5C (101.3F), Moderate Pain (4 - 6)        Vital Signs Last 24 Hrs  T(C): 36.7 (24 Apr 2022 05:25), Max: 36.7 (23 Apr 2022 17:08)  T(F): 98.1 (24 Apr 2022 05:25), Max: 98.1 (23 Apr 2022 17:08)  HR: 78 (24 Apr 2022 05:25) (66 - 78)  BP: 146/59 (24 Apr 2022 05:25) (126/68 - 151/75)  BP(mean): --  RR: 17 (24 Apr 2022 05:25) (17 - 18)  SpO2: 100% (24 Apr 2022 05:25) (95% - 100%)    CBC Full  -  ( 23 Apr 2022 06:27 )  WBC Count : 12.25 K/uL  RBC Count : 4.35 M/uL  Hemoglobin : 13.2 g/dL  Hematocrit : 43.6 %  Platelet Count - Automated : 153 K/uL  Mean Cell Volume : 100.2 fL  Mean Cell Hemoglobin : 30.3 pg  Mean Cell Hemoglobin Concentration : 30.3 gm/dL  Auto Neutrophil # : x  Auto Lymphocyte # : x  Auto Monocyte # : x  Auto Eosinophil # : x  Auto Basophil # : x  Auto Neutrophil % : x  Auto Lymphocyte % : x  Auto Monocyte % : x  Auto Eosinophil % : x  Auto Basophil % : x    04-24    141  |  103  |  14  ----------------------------<  89  3.9   |  26  |  0.63    Ca    9.4      24 Apr 2022 08:10  Phos  2.7     04-24  Mg     2.10     04-24    TPro  7.1  /  Alb  3.7  /  TBili  0.7  /  DBili  x   /  AST  13  /  ALT  6   /  AlkPhos  107  04-22    LIVER FUNCTIONS - ( 22 Apr 2022 12:25 )  Alb: 3.7 g/dL / Pro: 7.1 g/dL / ALK PHOS: 107 U/L / ALT: 6 U/L / AST: 13 U/L / GGT: x               MICROBIOLOGY:  .Blood Blood-Peripheral  04-22-22   No growth to date.  --  --              v    Rapid RVP Result: Detected (04-22 @ 12:33)          RADIOLOGY

## 2022-04-25 NOTE — DISCHARGE NOTE PROVIDER - NSDCFUSCHEDAPPT_GEN_ALL_CORE_FT
SHEKHAR VASQUEZ ; 05/12/2022 ; [facility] S[facility] [address] ; 05/12/2022 ; [facility] Surg [address] ; 06/13/2022 ; NPP Gastro [phone] [facility] [place]  [facility]  Surg [address]  Scheduled Appointment: 06/02/2022    [facility]  Surg Vasc [address]  Scheduled Appointment: 06/02/2022    Dane Salguero  [facility]  Gastro [phone] [facility]  Scheduled Appointment: 06/13/2022

## 2022-04-25 NOTE — DISCHARGE NOTE PROVIDER - CARE PROVIDER_API CALL
Siva Angel)  Vascular Surgery  [address] [facility], [address]  [place] [address]  Phone: [phone]  Fax: [phone]  Follow Up Time: 1 week

## 2022-04-25 NOTE — DISCHARGE NOTE PROVIDER - HOSPITAL COURSE
71F hx of Anxiety, Asthma, Breast cancer in situ, COPD not on home O2,. past DVT not presently on A/C, Graves disease, Hyperlipidemia, Hypertension, Hypothyroid, Parathyroidectomy, Kidney cancer, primary, with metastasis from kidney to other site, Left sided partial nephrectomy (no chemo or RT), Obesity, Sleep apnea, prior COVID, who presents to the for 1 day of bilateral LE swelling and redness, with blister on right medial ankle region. Wheelchair bound. Denies falls, trauma, fever, chills. Of note, patient has been trying lasix 40mg qd for LE swelling (going on for weeks now) w/o sig improvement. Patient also on pred 5mg qd reportedly for asthma.    Bilateral lower extrem  Venous stasis .      no acute cellulitis   Doppler legs negative for DVT. Has Leucocytosis but no fever etc.   IV Abxs stopped by ID.   diuretics, elevation...   ACE wrapping discussed with pt     HTN (hypertension).    BP meds with hold parameters.     COPD without exacerbation.   ·  Plan: Home inhalers. and prednisone.    Hypothyroidism.   ·  Plan: Home dose synthroid.     NOEMY (obstructive sleep apnea).   ·  Plan: Not using CPAP.    Viral infection.   ·  Plan: Symptomatic treatment.    Functional quadriplegia.   ·  Plan: Bed bound . Supportive care.      On 4/25/2022 this case was reviewed with Dr. Welch, the patient is medically stable and optimized for discharge. All medications were reviewed and prescriptions were sent to mutually agreed upon pharmacy.

## 2022-04-25 NOTE — CONSULT NOTE ADULT - REASON FOR ADMISSION
swelling,  redness and blisters legs

## 2022-04-25 NOTE — PROGRESS NOTE ADULT - ASSESSMENT
71F hx of Anxiety, Asthma, Breast cancer in situ, COPD not on home O2,. past DVT not presently on A/C, Graves disease, Hyperlipidemia, Hypertension, Hypothyroid, Parathyroidectomy, Kidney cancer, primary, with metastasis from kidney to other site, Left sided partial nephrectomy (no chemo or RT), Obesity, Sleep apnea, prior COVID, who presents to the for 1 day of bilateral LE swelling and redness, with blister on right medial ankle region. Wheelchair bound. Denies falls, trauma, fever, chills. Of note, patient has been trying lasix 40mg qd for LE swelling (going on for weeks now) w/o sig improvement. Patient also on pred 5mg qd reportedly for asthma.     Problem/Plan - 1:  ·  Problem: Bilateral lower leg cellulitis with Venous stasis .   ·  Plan: Doppler legs negative for DVT. Has Leucocytosis but no fever etc.   IV Abxs stopped by ID.   ID consult noted.   Will consult wound care also .     Problem/Plan - 2:  ·  Problem: HTN (hypertension).   ·  Plan: BP meds with hold parameters.     Problem/Plan - 3:  ·  Problem: COPD without exacerbation.   ·  Plan: Home inhalers. and prednisone.     Problem/Plan - 4:  ·  Problem: Hypothyroidism.   ·  Plan: Home dose synthroid.     Problem/Plan - 5:  ·  Problem: NOEMY (obstructive sleep apnea).   ·  Plan: Not using CPAP.     Problem/Plan - 6:  ·  Problem: Viral infection.   ·  Plan: Symptomatic treatment.     Problem/Plan - 7:  ·  Problem: Functional quadriplegia.   ·  Plan: Bed bound . Supportive care.    Dispo : DC planning . 
  _________________________________________________________________________________________  ========>>  M E D I C A L   A T T E N D I N G    F O L L O W  U P  N O T E  <<=========  -----------------------------------------------------------------------------------------------------    - Patient seen and examined by me earlier today.   - In summary,  SHEKHAR VASQUEZ is a 71y year old woman admitted with swelling   - Patient today overall doing ok, comfortable, eating OK.      states swelling of legs MUCH better , asking to go home     ==================>> REVIEW OF SYSTEM <<=================    GEN: no fever, no chills, no pain  RESP: no SOB, no cough, no sputum  CVS: no chest pain, no palpitations,  edema improved per pt >> near baseline   GI: no abdominal pain, no nausea, no constipation, no diarrhea  : no dysuria, no frequency, no hematuria  Neuro: no headache, no dizziness  Derm : no itching, no rash    ==================>> PHYSICAL EXAM <<=================    GEN: A&O X 3 , NAD , comfortable, pleasant, calm   HEENT: NCAT, PERRL, MMM, hearing intact  Neck: supple , no JVD appreciated  CVS: S1S2 , regular , No M/R/G appreciated  PULM: CTA B/L,  no W/R/R appreciated  ABD.: soft. non tender, non distended,  bowel sounds present  Extrem: intact pulses , chronic appearing edema and stasis changes / stasis dermatitis    PSYCH : normal mood,  not anxious                            ( Note Written / Date of service :  04-24-22 )    ==================>> MEDICATIONS <<====================    MEDICATIONS  (STANDING):  amLODIPine   Tablet 5 milliGRAM(s) Oral daily  aspirin enteric coated 81 milliGRAM(s) Oral daily  atorvastatin 40 milliGRAM(s) Oral at bedtime  baclofen 10 milliGRAM(s) Oral every 8 hours  busPIRone 10 milliGRAM(s) Oral <User Schedule>  DULoxetine 60 milliGRAM(s) Oral two times a day  enoxaparin Injectable 40 milliGRAM(s) SubCutaneous every 24 hours  furosemide    Tablet 40 milliGRAM(s) Oral daily  gabapentin 200 milliGRAM(s) Oral two times a day  hydrALAZINE 25 milliGRAM(s) Oral every 8 hours  levothyroxine 125 MICROGram(s) Oral daily  losartan 100 milliGRAM(s) Oral daily  melatonin 3 milliGRAM(s) Oral at bedtime  montelukast 10 milliGRAM(s) Oral daily  OLANZapine 20 milliGRAM(s) Oral daily  predniSONE   Tablet 5 milliGRAM(s) Oral daily  tiotropium 18 MICROgram(s) Capsule 1 Capsule(s) Inhalation daily    MEDICATIONS  (PRN):  acetaminophen     Tablet .. 650 milliGRAM(s) Oral every 6 hours PRN Temp greater or equal to 38.5C (101.3F), Moderate Pain (4 - 6)    ___________  Active diet:  Diet, DASH/TLC:   Sodium & Cholesterol Restricted  ___________________    ==================>> VITAL SIGNS <<==================    T(C): 36.9 (04-24-22 @ 17:22), Max: 36.9 (04-24-22 @ 17:22)  HR: 93 (04-24-22 @ 17:22) (66 - 93)  BP: 137/72 (04-24-22 @ 17:22) (126/68 - 146/59)  RR: 17 (04-24-22 @ 17:22) (17 - 18)  SpO2: 95% (04-24-22 @ 17:22) (95% - 100%)      ==================>> LAB AND IMAGING <<==================                        12.7   7.92  )-----------( 133      ( 24 Apr 2022 08:10 )             40.7        04-24    141  |  103  |  14  ----------------------------<  89  3.9   |  26  |  0.63    Ca    9.4      24 Apr 2022 08:10  Phos  2.7     04-24  Mg     2.10     04-24    Echo done, pending results  ___________________________________________________________________________________  ===============>>  A S S E S S M E N T   A N D   P L A N <<===============  ------------------------------------------------------------------------------------------    · Assessment	  71F hx of Anxiety, Asthma, Breast cancer in situ, COPD not on home O2,. past DVT not presently on A/C, Graves disease, Hyperlipidemia, Hypertension, Hypothyroid, Parathyroidectomy, Kidney cancer, primary, with metastasis from kidney to other site, Left sided partial nephrectomy (no chemo or RT), Obesity, Sleep apnea, prior COVID, who presents to the for 1 day of bilateral LE swelling and redness, with blister on right medial ankle region. Wheelchair bound. Denies falls, trauma, fever, chills. Of note, patient has been trying lasix 40mg qd for LE swelling (going on for weeks now) w/o sig improvement. Patient also on pred 5mg qd reportedly for asthma.     Problem/Plan - 1:  ·  Problem: Bilateral lower extrem  Venous stasis .      no acute cellulitis   ·  Plan: Doppler legs negative for DVT. Has Leucocytosis but no fever etc.   IV Abxs stopped by ID.   diuretics, elevation...   ACE wrapping discussed with pt      Problem/Plan - 2:  ·  Problem: HTN (hypertension).   ·  Plan: BP meds with hold parameters.     Problem/Plan - 3:  ·  Problem: COPD without exacerbation.   ·  Plan: Home inhalers. and prednisone.     Problem/Plan - 4:  ·  Problem: Hypothyroidism.   ·  Plan: Home dose synthroid.     Problem/Plan - 5:  ·  Problem: NOEMY (obstructive sleep apnea).   ·  Plan: Not using CPAP.     Problem/Plan - 6:  ·  Problem: Viral infection.   ·  Plan: Symptomatic treatment.     Problem/Plan - 7:  ·  Problem: Functional quadriplegia.   ·  Plan: Bed bound . Supportive care.    Dispo : DC planning back home     --------------------------------------------  Case discussed with pt, PA..   Education given on findings and plan of care  ___________________________  H. JANICE Welch.  Pager: [phone]    
  _________________________________________________________________________________________  ========>>  M E D I C A L   A T T E N D I N G    F O L L O W  U P  N O T E  <<=========  -----------------------------------------------------------------------------------------------------    - Patient seen and examined by me earlier today.   - In summary,  SHEKHAR VASQUEZ is a 71y year old woman admitted with swelling   - Patient today overall doing ok, comfortable, eating OK.      states swelling of legs MUCH better , asking to go home     ==================>> REVIEW OF SYSTEM <<=================    GEN: no fever, no chills, no pain  RESP: no SOB, no cough, no sputum  CVS: no chest pain, no palpitations,  edema improved per pt >> near baseline   GI: no abdominal pain, no nausea, no constipation, no diarrhea  : no dysuria, no frequency, no hematuria  Neuro: no headache, no dizziness  Derm : no itching, no rash    ==================>> PHYSICAL EXAM <<=================    GEN: A&O X 3 , NAD , comfortable, pleasant, calm   HEENT: NCAT, PERRL, MMM, hearing intact  Neck: supple , no JVD appreciated  CVS: S1S2 , regular , No M/R/G appreciated  PULM: CTA B/L,  no W/R/R appreciated  ABD.: soft. non tender, non distended,  bowel sounds present  Extrem: intact pulses , chronic appearing edema and stasis changes / stasis dermatitis    PSYCH : normal mood,  not anxious                            ( Note Written / Date of service :  04-25-22 )    ==================>> MEDICATIONS <<====================    MEDICATIONS  (STANDING):  amLODIPine   Tablet 5 milliGRAM(s) Oral daily  aspirin enteric coated 81 milliGRAM(s) Oral daily  atorvastatin 40 milliGRAM(s) Oral at bedtime  baclofen 10 milliGRAM(s) Oral every 8 hours  busPIRone 10 milliGRAM(s) Oral <User Schedule>  DULoxetine 60 milliGRAM(s) Oral two times a day  enoxaparin Injectable 40 milliGRAM(s) SubCutaneous every 24 hours  furosemide    Tablet 40 milliGRAM(s) Oral daily  gabapentin 200 milliGRAM(s) Oral two times a day  hydrALAZINE 25 milliGRAM(s) Oral every 8 hours  levothyroxine 125 MICROGram(s) Oral daily  losartan 100 milliGRAM(s) Oral daily  melatonin 3 milliGRAM(s) Oral at bedtime  montelukast 10 milliGRAM(s) Oral daily  OLANZapine 20 milliGRAM(s) Oral daily  predniSONE   Tablet 5 milliGRAM(s) Oral daily  tiotropium 18 MICROgram(s) Capsule 1 Capsule(s) Inhalation daily    MEDICATIONS  (PRN):  acetaminophen     Tablet .. 650 milliGRAM(s) Oral every 6 hours PRN Temp greater or equal to 38.5C (101.3F), Moderate Pain (4 - 6)    ___________  Active diet:  Diet, DASH/TLC:   Sodium & Cholesterol Restricted  ___________________    ==================>> VITAL SIGNS <<==================      Vital Signs Last 24 HrsT(C): 37.2 (04-25-22 @ 14:45)  T(F): 98.9 (04-25-22 @ 14:45), Max: 98.9 (04-25-22 @ 14:45)  HR: 89 (04-25-22 @ 14:45) (69 - 94)  BP: 157/70 (04-25-22 @ 14:45)  RR: 20 (04-25-22 @ 14:45) (17 - 20)  SpO2: 93% (04-25-22 @ 14:45) (93% - 98%)       ==================>> LAB AND IMAGING <<==================                                   12.2   6.55  )-----------( 145      ( 25 Apr 2022 09:07 )             38.6        04-25    145  |  105  |  14  ----------------------------<  75  3.9   |  29  |  0.60    Ca    9.3      25 Apr 2022 09:07  Phos  2.1     04-25  Mg     1.90     04-25    < from: TTE with Doppler (w/Cont) (04.24.22 @ 13:36) >  CONCLUSIONS:  Technically difficult study.  1. Mitral annular calcification, otherwise normal mitral  valve. Minimal mitral regurgitation.  2. Endocardium not well visualized; grossly normal left  ventricular systolic function.  Estimated LVEF in the  60-65% range (by visual estimate).  Endocardial  visualization enhanced with intravenous injection of echo contrast (Definity).  3. The right ventricle is not well visualized.  < end of copied text >    ___________________________________________________________________________________  ===============>>  A S S E S S M E N T   A N D   P L A N <<===============  ------------------------------------------------------------------------------------------    · Assessment	  71F hx of Anxiety, Asthma, Breast cancer in situ, COPD not on home O2,. past DVT not presently on A/C, Graves disease, Hyperlipidemia, Hypertension, Hypothyroid, Parathyroidectomy, Kidney cancer, primary, with metastasis from kidney to other site, Left sided partial nephrectomy (no chemo or RT), Obesity, Sleep apnea, prior COVID, who presents to the for 1 day of bilateral LE swelling and redness, with blister on right medial ankle region. Wheelchair bound. Denies falls, trauma, fever, chills. Of note, patient has been trying lasix 40mg qd for LE swelling (going on for weeks now) w/o sig improvement. Patient also on pred 5mg qd reportedly for asthma.     Problem/Plan - 1:  ·  Problem: Bilateral lower extrem  Venous stasis .      no acute cellulitis   ·  Plan: Doppler legs negative for DVT. Has Leucocytosis but no fever etc.   IV Abxs stopped by ID.   diuretics, elevation...   ACE wrapping discussed with pt already and emphasized  pt also to follow up with vascular Sx as OP      Problem/Plan - 2:  ·  Problem: HTN (hypertension).   ·  Plan: BP meds with hold parameters.     Problem/Plan - 3:  ·  Problem: COPD without exacerbation.   ·  Plan: Home inhalers. and prednisone.     Problem/Plan - 4:  ·  Problem: Hypothyroidism.   ·  Plan: Home dose synthroid.     Problem/Plan - 5:  ·  Problem: NOEMY (obstructive sleep apnea).   ·  Plan: Not using CPAP.     Problem/Plan - 6:  ·  Problem: Viral infection.   ·  Plan: Symptomatic treatment.     Problem/Plan - 7:  ·  Problem: Functional quadriplegia.   ·  Plan: Bed bound . Supportive care.    Dispo : DC planning back home     --------------------------------------------  Case discussed with pt and family at bedside, med team   Education given on findings and plan of care  ___________________________  H. JANICE Welch.  Pager: [phone]    
72 yo F with morbid obesity, bilateral chronic venous stasis, DVT in the past but currently not on AC, HTN, HLD, hypothyroidism, parathyroidectomy, anxiety, asthma on prednisone 5mg daily, breast cancer in situ, skin cancer on nose s/p removal, kidney cancer s/p left partial nephrectomy (no chemo or RT), NOEMY, prior COVID-19 infection in 01/2021, former smoker, COPD not on home O2, IBS with chronic diarrhea, opresents with right leg hematoma and increased bilateral leg swelling and redness.    She denies fever, chills, cough, SOB, sore throat or runny nose. Her  had flu-like symptoms a week ago for which he was prescribed prednisone. She denies injury or fall.  Pt is also RVP positive for OC43 Coronavirus, but SARS-CoV-2 negative.  Of ID note in 12/2019, pt already had venous stasis, at that time also concerning for cellulitis per team due to leukocytosis.  Currently pt has mild leukocytosis WBC=12, but a year ago in 01/2021 when pt had COVID, it was also about 12.  Pt is also RVP positive for OC43 Coronavirus, but SARS-CoV-2 negative. CXR no change over a year.  DVT negative.    LE edema, coronavirus infection/URI, leg hematoma   -no s/s of active bacterial infection  -no clear cut cellulitis or abscess on legs  -observe off abx  -supportive care for URI/coronavirus infection   -limb elevation PRN     Camilo Dunlap  Attending Physician   Division of Infectious Disease  Office #[phone]  Available on Microsoft Teams also  After 5pm/weekend or no response, call #[phone]

## 2022-04-25 NOTE — CONSULT NOTE ADULT - ASSESSMENT
Assessment: 71F hx of Anxiety, Asthma, Breast cancer in situ, COPD not on home O2,. past DVT not presently on A/C, Graves disease, Hyperlipidemia, Hypertension, Hypothyroid, Parathyroidectomy, Kidney cancer, primary, with metastasis from kidney to other site, Left sided partial nephrectomy (no chemo or RT), Obesity, Sleep apnea, prior COVID, who presents to the for 1 day of bilateral LE swelling and redness, with blister on right medial ankle region. Wheelchair bound. Denies falls, trauma, fever, chills. Of note, patient has been trying lasix 40mg qd for LE swelling (going on for weeks now) w/o sig improvement. Patient also on pred 5mg qd reportedly for asthma.    Plan:    Wound Consult requested to assist w/ management of    Compression BLE  Consider PAVEL/PVR, XRay, Duplex, MRI, CT  BLE elevation  Abx per Medicine/ ID  Moisturize intact skin w/ SWEEN cream BID  Nutrition Consult for optimization as tolerated in pt w/ Protein Calorie Malnutirion        Inadequate PO intake        consider MVI & Vit C to promote wound healing        encourage high quality protein when appropriate  Hyperglycemia - consider HgA1c        FS w/ ISS q6h, consider Endo Consult  Anemia- transfusions, Fe studies  Continue turning and positioning w/ offloading assistive devices as per protocol  Continue w/ Pericare as per protocol  Waffle Cushion to chair when oob to chair  Continue w/ low air loss fluidized bed surface     Care as per medicine, will follow w/ you    Upon discharge f/u as outpatient at [facility] [address] [place], [place] [phone]  Seen w/ attng and D/w team    Thank you for this consult  SHANELLE Villafana-BC, CWLEXIN (pager #[phone])    If after 4PM or before 7:30AM on Mon-Friday or weekend/holiday please contact general surgery for urgent matters.   Team A- [id number]   Team B- [id number]  For non-urgent matters e-mail [email or web address]    I/We spent (50min) minutes face to face with this patient of which more than 50% of the time was spent counseling & coordinating care of this pt   A/P: 71F hx of Anxiety, Asthma, Breast cancer in situ, COPD not on home O2,. past DVT not presently on A/C, Graves disease, Hyperlipidemia, Hypertension, Hypothyroid, Parathyroidectomy, Kidney cancer, primary, with metastasis from kidney to other site, Left sided partial nephrectomy (no chemo or RT), Obesity, Sleep apnea, prior COVID, who presents to the for 1 day of bilateral LE swelling and redness, with blister on right medial ankle region. Wheelchair bound. Denies falls, trauma, fever, chills. Of note, patient has been trying lasix 40mg qd for LE swelling (going on for weeks now) w/o sig improvement. Patient also on pred 5mg qd reportedly for asthma.    Wound Consult requested to assist w/ management of b/l le lymphedema  -No open ulceration notes, skin intact. No s/s of cellulitis  -Right medial malleolus with intact blood filled blister without drainage, no fluctuance, no s/s of acute skin infection/cellulitis. No indication to drain.  -venous duplex limited due to body habitus, no evidence of DVT in b/l le that were visualized.  -x-ray tibia/fibula without osteomyelitis.  -recommend compression liquid barrier film to blood filled blister, covered with 4x4 gauze. Compression wrap bilaterally. Change daily.  -Leg elevation.  -f/u outpatient with vascular MD as planned    Risk for moisture associated dermatitis to intertriginous folds   -no candida noted.  -cleanse with luke-warm soap and water, dry well. Apply Interdry textile sheeting, under intertriginous folds leaving 2 inches exposed at ends to wick, remove to wash & dry affected area, then replace. Individual sheeting may be used for up to 5 days unless soiled.     Risk for incontinence associated dermatitis  -perineal care per protocol  -Charo moisture barrier paste (zinc oxide based barrier with dimethicone) every shift and prn with episodes of incontinence    Additional recommendations:  Continue turning and positioning w/ offloading assistive devices as per protocol  Continue w/ Pericare as per protocol  Continue w/ low air loss fluidized bed surface     Remainder of care per primary team.  Findings and plan discussed with primary team PA.    Thank you for this consult  SHANELLE Villafana-BC, CWLEXIN (pager #[phone])    If after 4PM or before 7:30AM on Mon-Friday or weekend/holiday please contact general surgery for urgent matters.   Team A- [id number]   Team B- [id number]  For non-urgent matters e-mail [email or web address]    I spent 50 minutes face to face with this patient of which more than 50% of the time was spent counseling & coordinating care of this pt

## 2022-04-25 NOTE — PROGRESS NOTE ADULT - REASON FOR ADMISSION
swelling,  redness and blisters legs

## 2022-04-25 NOTE — DISCHARGE NOTE PROVIDER - NSDCCPCAREPLAN_GEN_ALL_CORE_FT
PRINCIPAL DISCHARGE DIAGNOSIS  Diagnosis: Cellulitis  Assessment and Plan of Treatment: - You were admitted for lower extremity swelling.  -You were seen by the Infection disease team. They did not think you had cellulitis and no antibiotics were recommended.  -You had a DVT Study down of the lower extremity. The US showed: No evidence of deep venous thrombosis in either lower extremity venous   segments able to be examined.  -You were seen by the vascular team they suggested the cause could be chronic Venous insufficiency. They recommeded you elevate and wrape your lower extremity.   - You were also seen by the wound team. They recommend compression liquid barrier film to blood filled blister, covered with 4x4 gauze. Compression wrap bilaterally. Change daily.Leg elevation.  -Follow up outpatient with vascular, DR. Angel.  -Follow up with your PCP within 1 week.        SECONDARY DISCHARGE DIAGNOSES  Diagnosis: HTN (hypertension)  Assessment and Plan of Treatment: -Continue medication as prescribed

## 2022-04-25 NOTE — DISCHARGE NOTE PROVIDER - NSDCMRMEDTOKEN_GEN_ALL_CORE_FT
amLODIPine 5 mg oral tablet: 1 tab(s) orally once a day  aspirin 81 mg oral delayed release tablet: 1 tab(s) orally once a day  atorvastatin 40 mg oral tablet: 1 tab(s) orally once a day  baclofen 10 mg oral tablet: 1 tab(s) orally 3 times a day  busPIRone 10 mg oral tablet: 1 tab(s) orally once a day at noon  DULoxetine 60 mg oral delayed release capsule: 1 cap(s) orally 2 times a day  furosemide 40 mg oral tablet: 1 tab(s) orally once a day  gabapentin 100 mg oral capsule: 2 cap(s) orally 2 times a day  hydrALAZINE 25 mg oral tablet: 3 tab(s) orally 2 times a day  levothyroxine 125 mcg (0.125 mg) oral tablet: 1 tab(s) orally once a day  losartan 100 mg oral tablet: 1 tab(s) orally once a day  melatonin 3 mg oral tablet: 1 tab(s) orally once a day (at bedtime)  montelukast 10 mg oral tablet: 1 tab(s) orally once a day  OLANZapine 20 mg oral tablet: 1 tab(s) orally once a day (at bedtime)  predniSONE 5 mg oral tablet: 1 tab(s) orally once a day  Spiriva HandiHaler 18 mcg inhalation capsule: 1 cap(s) inhaled once a day  Vitamin B12 1000 mcg oral tablet: 1 tab(s) orally once a day

## 2022-04-25 NOTE — CONSULT NOTE ADULT - SUBJECTIVE AND OBJECTIVE BOX
Wound SURGERY CONSULT NOTE    HPI:  71F hx of Anxiety, Asthma, Breast cancer in situ, COPD not on home O2,. past DVT not presently on A/C, Graves disease, Hyperlipidemia, Hypertension, Hypothyroid, Parathyroidectomy, Kidney cancer, primary, with metastasis from kidney to other site, Left sided partial nephrectomy (no chemo or RT), Obesity, Sleep apnea, prior COVID, who presents today for 1 day of bilateral LE swelling and redness, with blister on right medial ankle region. Wheelchair bound. Denies falls, trauma, fever, chills. Of note, patient has been trying lasix 40mg qd for LE swelling (going on for weeks now) w/o sig improvement. Patient also on pred 5mg qd reportedly for asthma. (2022 17:17)    Wound consult requested to assist w/ management of b/l le venous insufficiency with right blister to right medial malleolus. Per patient and  at bedside, patient is wheelchair bound, she has a HHA, wears diapers at home. She has been on Furosemide at home and elevated her legs while in bed. Reports that once she is sitting up in her wheelchair both legs "swell tremendously." Reports that she has healed burns to the LLE, etiology of burn is unknown. Per patient she reported the leg swelling to her cardiologist, Dr. Salcedo who referred her to Dr. Oh, vascular surgeon for an evaluation. She has an appt scheduled for early May.  Per patient since hospitalization the swelling has improved greatly. Denies pain and/or tenderness. Denies fever, chills, n/v, diarrhea.    Current Diet: Diet, DASH/TLC:   Sodium & Cholesterol Restricted (22 @ 18:20)      PAST MEDICAL & SURGICAL HISTORY:  Hypertension    Hyperlipidemia    Anxiety    Graves disease    Kidney cancer, primary, with metastasis from kidney to other site, left  LEft sided- s/p nephrectomy only- no chemo or RT    Breast cancer in situ, left    COPD (chronic obstructive pulmonary disease)    Hypothyroid    DVT (deep venous thrombosis)  history of- not presently on A/C    Obesity    Sleep apnea    Asthma    S/P cholecystectomy    S/p nephrectomy  left    S/P breast biopsy  left    History of pelvic surgery  Pelvic Sling    History of eye surgery  7 surgeries secondary to grave&#x27;s disease    History of carpal tunnel release  right wrist    History of parathyroidectomy    S/P laminectomy  now bed and wheelchair ridden with severe pain        REVIEW OF SYSTEMS: General, Skin: see HPI  All other systems negative    MEDICATIONS  (STANDING):  amLODIPine   Tablet 5 milliGRAM(s) Oral daily  aspirin enteric coated 81 milliGRAM(s) Oral daily  atorvastatin 40 milliGRAM(s) Oral at bedtime  baclofen 10 milliGRAM(s) Oral every 8 hours  busPIRone 10 milliGRAM(s) Oral <User Schedule>  DULoxetine 60 milliGRAM(s) Oral two times a day  enoxaparin Injectable 40 milliGRAM(s) SubCutaneous every 24 hours  furosemide    Tablet 40 milliGRAM(s) Oral daily  gabapentin 200 milliGRAM(s) Oral two times a day  hydrALAZINE 25 milliGRAM(s) Oral every 8 hours  levothyroxine 125 MICROGram(s) Oral daily  losartan 100 milliGRAM(s) Oral daily  melatonin 3 milliGRAM(s) Oral at bedtime  montelukast 10 milliGRAM(s) Oral daily  OLANZapine 20 milliGRAM(s) Oral daily  potassium phosphate / sodium phosphate Powder (PHOS-NaK) 1 Packet(s) Oral once  predniSONE   Tablet 5 milliGRAM(s) Oral daily  tiotropium 18 MICROgram(s) Capsule 1 Capsule(s) Inhalation daily    MEDICATIONS  (PRN):  acetaminophen     Tablet .. 650 milliGRAM(s) Oral every 6 hours PRN Temp greater or equal to 38.5C (101.3F), Moderate Pain (4 - 6)      Allergies    Grapes (Hives)  No Known Drug Allergies  Peaches (Hives)  shellfish (Hives)    Intolerances        SOCIAL HISTORY:  , wheelchair bound, has HHA.   Denies smoking, ETOH, illicit drug use.    FAMILY HISTORY:  Family history of myocardial infarction  mother  at age 67 and father at age 67 of MI;s    Family history of hypertension  mother and father    Family history of diabetes mellitus (Sibling)  siblings    Family history of heart disease (Sibling)  brother has a PPM    Family history of thyroid cancer (Child)  daughter has thyroid cancer        PHYSICAL EXAM:  Vital Signs Last 24 Hrs  T(C): 36.8 (2022 05:22), Max: 37.1 (2022 22:34)  T(F): 98.3 (2022 05:22), Max: 98.7 (2022 22:34)  HR: 69 (2022 05:22) (69 - 94)  BP: 149/63 (2022 05:22) (137/62 - 153/65)  BP(mean): --  RR: 18 (2022 05:22) (17 - 18)  SpO2: 98% (2022 05:22) (95% - 98%)    Constitutional: NAD, A&O x 4, obese  (+) low airloss support surface, (+) fluidized positioning devices   HEENT:  NC/AT, + eyeglasses  Cardiovascular: rate regular   Respiratory: nonlabored, equal chest rise, supplemental oxygen via NC  Gastrointestinal: soft NT/ND, fecal incontinence, abdominal pannus with increased moisture, skin intact  : incontinence of urine, external urinary collection device in place  Musculoskeletal:  legs in extension, b/l foot drop. +ROM bilateral upper extremities.  Vascular: BLE equally warm, +lymphedema bilaterally, +1 dp pulses, capillary refill < 3 seconds.  Multiple scattered areas of hypopigmentation to bilateral lower legs, evidence of previously healed ulcerations.  Right medial lower leg blood filled blister [id number], non tender to touch, no edema, no erythema. Periwound skin without fluctuance.   No open ulcerations noted.  No cellulitis noted  Skin:  moist w/ good turgor, increased moisture without candida to bilateral breasts, bilateral groin, medial thighs, sacral fold      LABS/ CULTURES/ RADIOLOGY:                        12.2   6.55  )-----------( 145      ( 2022 09:07 )             38.6       145  |  105  |  14  ----------------------------<  75      [22 @ 09:07]  3.9   |  29  |  0.60        Ca     9.3     [22 @ 09:07]      Mg     1.90     [22 @ 09:07]      Phos  2.1     [22 @ 09:07]                Culture - Blood (collected 22 @ 18:33)  Source: .Blood Blood-Venous  Preliminary Report (22 @ 19:02):    No growth to date.    Culture - Blood (collected 22 @ 18:33)  Source: .Blood Blood-Peripheral  Preliminary Report (22 @ 19:02):    No growth to date.      < from: US Duplex Venous Lower Ext Complete, Bilateral (22 @ 13:10) >  ACC: [id number] EXAM:  US DPLX LWR EXT VEINS COMPL BI                          PROCEDURE DATE:  2022          INTERPRETATION:  CLINICAL INFORMATION: Lower extremity swelling    COMPARISON: None available.    TECHNIQUE: Duplex sonography of the BILATERAL LOWER extremity veins with   color and spectral Doppler, with and without compression.    FINDINGS:    RIGHT:  Normal compressibility of the RIGHT common femoral, proximal and mid   femoral and popliteal veins. Lower femoral venous segment was not   visualized.  Doppler examination shows normal spontaneous and phasic flow.  Calf veins were not able to be diagnostically visualized.    LEFT:  Normal compressibility of the LEFT common femoral, proximal and mid   femoral and popliteal veins.Lower femoral venous segment was not   visualized.  Doppler examination shows normal spontaneous and phasic flow.  Calf veins were not able to be diagnostically visualized.    IMPRESSION: Limited assessment by body habitus    No evidence of deep venous thrombosis in either lower extremity venous   segments able to be examined.          --- End of Report ---            KARINA YOUNG MD; Attending Radiologist  This document has been electronically signed. 2022  1:15PM    < end of copied text >        < from: Xray Tibia + Fibula 2 Views, Bilateral (22 @ 12:33) >  ACC: [id number] EXAM:  XR TIB FIB AP LAT 2 VIEWS BI                          PROCEDURE DATE:  2022          INTERPRETATION:  CLINICAL INDICATION: bilateral leg pain and swelling    EXAM:  AP and lateral views of both legs from 2022 at 1233. Compared to   prior study from 2019.    IMPRESSION:  Stable intact lateral distal fibular fracture fixation plate with   fixation screws and 3 syndesmotic screws with underlying anatomic   alignment maintained. No dislocations or acute appearing fractures.    Diffuse prominent bilateral soft tissue swelling which could be related   to cellulitis superimposed on lymphedema, correlate clinically. No   tracking soft tissue gas collections, radiopaque foreign bodies, or gross   radiographic evidence for osteomyelitis.    Preserved visualized joint spaces and no joint margin erosions.    Generalized mild osteopenia otherwise no discrete suspicious lytic or   blastic lesions.    --- End of Report ---            JUDITH CONTRERAS MD; Attending Radiologist  This document has been electronically signed. 2022  3:26PM    < end of copied text >

## 2022-04-25 NOTE — DISCHARGE NOTE NURSING/CASE MANAGEMENT/SOCIAL WORK - NSDCPEFALRISK_GEN_ALL_CORE
For information on Fall & Injury Prevention, visit: [email or web address] OR  [email or web address] OR  https://www.cdc.gov/steadi/patient.html

## 2022-04-25 NOTE — DISCHARGE NOTE PROVIDER - NSDCCAREPROVSEEN_GEN_ALL_CORE_FT
Kamila Rico-Yoni Angel  . -Attending Only-  Hunter Alexander  User ADM  Ordering Physician  Juan Lund  Team [facility] Medicine ACP

## 2022-04-26 NOTE — ED ADULT NURSE NOTE - NSFALLRSKASSESSDT_ED_ALL_ED
Health Maintenance Due   Topic Date Due   • COVID-19 Vaccine (1) Never done       Patient is due for topics as listed above but is not proceeding with Immunization(s) COVID-19 at this time.      06-Jan-2021 10:43

## 2022-04-27 PROCEDURE — 99443: CPT | Mod: 95

## 2022-05-08 NOTE — PHYSICAL THERAPY INITIAL EVALUATION ADULT - PHYSICAL ASSIST/NONPHYSICAL ASSIST: SIT/STAND, REHAB EVAL
Assumed care at 2315. VSS, afebrile. Patient complaining of cramping pain and abdominal pain from frequent fundal checks. Pain managed well with tylenol, ibuprofen and warm packs. Photos of baby provided to patient. Support person at bedside attentive to patient. Grieving appropriately. [place]ion WNL. Patient able to rest later half of the shift.     
Detail Level: Zone
Otc Regimen: Lotion/cream of choose per itch
Otc Regimen: Sunscreen of choose
verbal cues/1 person assist

## 2022-05-17 NOTE — PATIENT PROFILE ADULT - PSYCHOSOCIAL CONCERNS
Caller: patient  refill, Birth control   Details of condition: Patient calling to schedule annual so she can get a refill on birth control. Patient aware of Dr Orellana  Leaving for maternity leave and wants to wait until she comes back. Patient wants to know if she can get enough refills to last until she can schedule?   Pharmacy verified? Yes  Appointment offered? Yes  Best time to reach patient: any  Patient would like a call back at mobile   (enter if call back number is different from primary phone number)  Okay to leave a detailed voicemail? yes       none

## 2022-05-18 PROCEDURE — 99443: CPT | Mod: 95

## 2022-06-02 ENCOUNTER — APPOINTMENT (OUTPATIENT)
Dept: VASCULAR SURGERY | Facility: CLINIC | Age: 72
End: 2022-06-02

## 2022-06-06 NOTE — BEHAVIORAL HEALTH ASSESSMENT NOTE - NSBHREFERLPTEAMNAME_PSY_A_CORE_FT
Dr. Sheets notified of hemaglobin 7.6.  OK to send home if patient remains hemodynamically stable.  Notified Dr. Holland of Hemaglobin.  OK to send home if patient remains hemodynamically stable.  Orders for patient to take iron supllement and return to office for hemaglobin check.   
Pt states normal BP is low 90/50.  Pt walked around unit, no dizziness, steady on feet, no nausea.  /64 HR 98, RR 20, pain 4/10. Pt refuses medication for pain.  Eating and drinking  
Mindi Delgado NP

## 2022-06-13 ENCOUNTER — APPOINTMENT (OUTPATIENT)
Dept: GASTROENTEROLOGY | Facility: CLINIC | Age: 72
End: 2022-06-13

## 2022-06-14 NOTE — ED BEHAVIORAL HEALTH ASSESSMENT NOTE - ORIENTED TO SITUATION
PROCEDURE NOTE: Punctal Plugs, Emerita Terence ([id number], [id number]) OU. Diagnosis: Dry Eye Syndrome. Prior to treatment, the risks/benefits/alternatives were discussed. The patient wished to proceed with procedure. Temporary collagen plugs were inserted. Patient tolerated procedure well. There were no complications. Post procedure instructions given. Blanca Hammond Yes

## 2022-06-20 ENCOUNTER — EMERGENCY (EMERGENCY)
Facility: HOSPITAL | Age: 72
LOS: 1 days | Discharge: HOME | End: 2022-06-20
Attending: STUDENT IN AN ORGANIZED HEALTH CARE EDUCATION/TRAINING PROGRAM
Payer: MEDICARE

## 2022-06-20 VITALS
OXYGEN SATURATION: 93 % | HEART RATE: 84 BPM | DIASTOLIC BLOOD PRESSURE: 77 MMHG | RESPIRATION RATE: 18 BRPM | TEMPERATURE: 99 F | SYSTOLIC BLOOD PRESSURE: 124 MMHG

## 2022-06-20 VITALS
RESPIRATION RATE: 22 BRPM | HEIGHT: 60 IN | DIASTOLIC BLOOD PRESSURE: 89 MMHG | TEMPERATURE: 99 F | HEART RATE: 90 BPM | SYSTOLIC BLOOD PRESSURE: 130 MMHG | OXYGEN SATURATION: 96 %

## 2022-06-20 DIAGNOSIS — Z98.89 OTHER SPECIFIED POSTPROCEDURAL STATES: Chronic | ICD-10-CM

## 2022-06-20 DIAGNOSIS — Z90.5 ACQUIRED ABSENCE OF KIDNEY: Chronic | ICD-10-CM

## 2022-06-20 DIAGNOSIS — Z90.49 ACQUIRED ABSENCE OF OTHER SPECIFIED PARTS OF DIGESTIVE TRACT: Chronic | ICD-10-CM

## 2022-06-20 LAB
ALBUMIN SERPL ELPH-MCNC: 3.7 G/DL — SIGNIFICANT CHANGE UP (ref 3.3–5)
ALP SERPL-CCNC: 108 U/L — SIGNIFICANT CHANGE UP (ref 40–120)
ALT FLD-CCNC: 13 U/L — SIGNIFICANT CHANGE UP (ref 10–45)
ANION GAP SERPL CALC-SCNC: 13 MMOL/L — SIGNIFICANT CHANGE UP (ref 5–17)
AST SERPL-CCNC: 18 U/L — SIGNIFICANT CHANGE UP (ref 10–40)
BASE EXCESS BLDV CALC-SCNC: 6.9 MMOL/L — HIGH (ref -2–2)
BASOPHILS # BLD AUTO: 0.05 K/UL — SIGNIFICANT CHANGE UP (ref 0–0.2)
BASOPHILS NFR BLD AUTO: 0.5 % — SIGNIFICANT CHANGE UP (ref 0–2)
BILIRUB SERPL-MCNC: 0.4 MG/DL — SIGNIFICANT CHANGE UP (ref 0.2–1.2)
BUN SERPL-MCNC: 23 MG/DL — SIGNIFICANT CHANGE UP (ref 7–23)
CA-I SERPL-SCNC: 1.3 MMOL/L — SIGNIFICANT CHANGE UP (ref 1.15–1.33)
CALCIUM SERPL-MCNC: 9.6 MG/DL — SIGNIFICANT CHANGE UP (ref 8.4–10.5)
CHLORIDE BLDV-SCNC: 103 MMOL/L — SIGNIFICANT CHANGE UP (ref 96–108)
CHLORIDE SERPL-SCNC: 104 MMOL/L — SIGNIFICANT CHANGE UP (ref 96–108)
CO2 BLDV-SCNC: 35 MMOL/L — HIGH (ref 22–26)
CO2 SERPL-SCNC: 23 MMOL/L — SIGNIFICANT CHANGE UP (ref 22–31)
CREAT SERPL-MCNC: 0.89 MG/DL — SIGNIFICANT CHANGE UP (ref 0.5–1.3)
EGFR: 68 ML/MIN/1.73M2 — SIGNIFICANT CHANGE UP
EOSINOPHIL # BLD AUTO: 0.11 K/UL — SIGNIFICANT CHANGE UP (ref 0–0.5)
EOSINOPHIL NFR BLD AUTO: 1 % — SIGNIFICANT CHANGE UP (ref 0–6)
GAS PNL BLDV: 136 MMOL/L — SIGNIFICANT CHANGE UP (ref 136–145)
GAS PNL BLDV: SIGNIFICANT CHANGE UP
GLUCOSE BLDV-MCNC: 123 MG/DL — HIGH (ref 70–99)
GLUCOSE SERPL-MCNC: 115 MG/DL — HIGH (ref 70–99)
HCO3 BLDV-SCNC: 33 MMOL/L — HIGH (ref 22–29)
HCT VFR BLD CALC: 39.9 % — SIGNIFICANT CHANGE UP (ref 34.5–45)
HCT VFR BLDA CALC: 40 % — SIGNIFICANT CHANGE UP (ref 34.5–46.5)
HGB BLD CALC-MCNC: 13.2 G/DL — SIGNIFICANT CHANGE UP (ref 11.7–16.1)
HGB BLD-MCNC: 12.7 G/DL — SIGNIFICANT CHANGE UP (ref 11.5–15.5)
IMM GRANULOCYTES NFR BLD AUTO: 0.5 % — SIGNIFICANT CHANGE UP (ref 0–1.5)
LACTATE BLDV-MCNC: 2.3 MMOL/L — HIGH (ref 0.7–2)
LYMPHOCYTES # BLD AUTO: 1.31 K/UL — SIGNIFICANT CHANGE UP (ref 1–3.3)
LYMPHOCYTES # BLD AUTO: 12 % — LOW (ref 13–44)
MCHC RBC-ENTMCNC: 30.6 PG — SIGNIFICANT CHANGE UP (ref 27–34)
MCHC RBC-ENTMCNC: 31.8 GM/DL — LOW (ref 32–36)
MCV RBC AUTO: 96.1 FL — SIGNIFICANT CHANGE UP (ref 80–100)
MONOCYTES # BLD AUTO: 0.63 K/UL — SIGNIFICANT CHANGE UP (ref 0–0.9)
MONOCYTES NFR BLD AUTO: 5.7 % — SIGNIFICANT CHANGE UP (ref 2–14)
NEUTROPHILS # BLD AUTO: 8.81 K/UL — HIGH (ref 1.8–7.4)
NEUTROPHILS NFR BLD AUTO: 80.3 % — HIGH (ref 43–77)
NRBC # BLD: 0 /100 WBCS — SIGNIFICANT CHANGE UP (ref 0–0)
PCO2 BLDV: 52 MMHG — HIGH (ref 39–42)
PH BLDV: 7.41 — SIGNIFICANT CHANGE UP (ref 7.32–7.43)
PLATELET # BLD AUTO: 152 K/UL — SIGNIFICANT CHANGE UP (ref 150–400)
PO2 BLDV: 53 MMHG — HIGH (ref 25–45)
POTASSIUM BLDV-SCNC: 4.4 MMOL/L — SIGNIFICANT CHANGE UP (ref 3.5–5.1)
POTASSIUM SERPL-MCNC: 4.8 MMOL/L — SIGNIFICANT CHANGE UP (ref 3.5–5.3)
POTASSIUM SERPL-SCNC: 4.8 MMOL/L — SIGNIFICANT CHANGE UP (ref 3.5–5.3)
PROT SERPL-MCNC: 7 G/DL — SIGNIFICANT CHANGE UP (ref 6–8.3)
RBC # BLD: 4.15 M/UL — SIGNIFICANT CHANGE UP (ref 3.8–5.2)
RBC # FLD: 14.9 % — HIGH (ref 10.3–14.5)
SAO2 % BLDV: 83.9 % — SIGNIFICANT CHANGE UP (ref 67–88)
SODIUM SERPL-SCNC: 140 MMOL/L — SIGNIFICANT CHANGE UP (ref 135–145)
WBC # BLD: 10.96 K/UL — HIGH (ref 3.8–10.5)
WBC # FLD AUTO: 10.96 K/UL — HIGH (ref 3.8–10.5)

## 2022-06-20 PROCEDURE — 84132 ASSAY OF SERUM POTASSIUM: CPT

## 2022-06-20 PROCEDURE — 80053 COMPREHEN METABOLIC PANEL: CPT

## 2022-06-20 PROCEDURE — 83605 ASSAY OF LACTIC ACID: CPT

## 2022-06-20 PROCEDURE — 82435 ASSAY OF BLOOD CHLORIDE: CPT

## 2022-06-20 PROCEDURE — 82803 BLOOD GASES ANY COMBINATION: CPT

## 2022-06-20 PROCEDURE — 73590 X-RAY EXAM OF LOWER LEG: CPT

## 2022-06-20 PROCEDURE — 85025 COMPLETE CBC W/AUTO DIFF WBC: CPT

## 2022-06-20 PROCEDURE — 99284 EMERGENCY DEPT VISIT MOD MDM: CPT | Mod: 25

## 2022-06-20 PROCEDURE — 82330 ASSAY OF CALCIUM: CPT

## 2022-06-20 PROCEDURE — 93970 EXTREMITY STUDY: CPT | Mod: 26

## 2022-06-20 PROCEDURE — 73590 X-RAY EXAM OF LOWER LEG: CPT | Mod: 26,LT

## 2022-06-20 PROCEDURE — 85014 HEMATOCRIT: CPT

## 2022-06-20 PROCEDURE — 87040 BLOOD CULTURE FOR BACTERIA: CPT

## 2022-06-20 PROCEDURE — 93970 EXTREMITY STUDY: CPT

## 2022-06-20 PROCEDURE — 99284 EMERGENCY DEPT VISIT MOD MDM: CPT

## 2022-06-20 PROCEDURE — 82947 ASSAY GLUCOSE BLOOD QUANT: CPT

## 2022-06-20 PROCEDURE — 84295 ASSAY OF SERUM SODIUM: CPT

## 2022-06-20 PROCEDURE — 85018 HEMOGLOBIN: CPT

## 2022-06-20 RX ORDER — SODIUM CHLORIDE 9 MG/ML
500 INJECTION INTRAMUSCULAR; INTRAVENOUS; SUBCUTANEOUS ONCE
Refills: 0 | Status: DISCONTINUED | OUTPATIENT
Start: 2022-06-20 | End: 2022-06-20

## 2022-06-20 NOTE — ED PROVIDER NOTE - CLINICAL SUMMARY MEDICAL DECISION MAKING FREE TEXT BOX
72 y/o F with multiple comorbidities presenting to the ED with wound to left lateral leg. Vitals stable. Patient in NAD. Wound to LLE appears oozing blood with exquisite overlying tenderness. Will obtain labs and XR to r/o underlying infection. Reassess. Artem Armstrong, DO PGY3

## 2022-06-20 NOTE — ED PROVIDER NOTE - NS ED ROS FT
CONST: no fevers, no chills  EYES: no pain, no vision changes  ENT: no sore throat, no ear pain, no change in hearing  CV: no chest pain, + leg swelling  RESP: no shortness of breath, no cough  ABD: no abdominal pain, no nausea, no vomiting, no diarrhea  : no dysuria, no flank pain, no hematuria  MSK: no back pain, + extremity pain  NEURO: no headache or additional neurologic complaints  HEME: no easy bleeding  SKIN:  no rash

## 2022-06-20 NOTE — ED PROVIDER NOTE - ATTENDING CONTRIBUTION TO CARE
I, Rafael Palumbo, performed a history and physical exam of the patient and discussed their management with the resident and/or advanced care provider. I reviewed the resident and/or advanced care provider's note and agree with the documented findings and plan of care. I was present and available for all procedures.    70 y/o F with PMH Anxiety, Asthma, Breast cancer in situ, COPD not on home O2,. past DVT not presently on A/C, Graves disease, Hyperlipidemia, Hypertension, Hypothyroid, Parathyroidectomy, Kidney cancer, primary, with metastasis from kidney to other site, Left sided partial nephrectomy (no chemo or RT), Obesity presenting to the ED with wound to L lower leg. Patient states it initially appeared as a fluid filled blister which opened. She saw her doctor last week and was placed on antibiotics (she is unsure of which abx) and she has been taking for the past 6 days. She reports continued pain to the LLE and was instructed by her PMD to come to the ED for possible IV abx. She reports chronic edema in her lower extremities. No fever, chills, N/V.    Well appearing and in NAD, head normal appearing atraumatic, trachea midline, no respiratory distress, lungs cta bilaterally, rrr no murmurs, soft NT ND abdomen, severe bilateral le pitting edema diffusely ttp no fluctuance, no surrounding erythema, no crepitus and no drainage, otherwise no visible extremity deformities, Alert and oriented, non focal neuro exam, skin warm and dry, normal affect and mood. vieyra x4 s/s intact all 4 ext strong pulses bilateral dorsalis pedis, small lat calf region healing abrasion, no fluctuance, no surrounding erythema, no crepitus and no drainage     concerning for worsening bilateral le edema in the setting of lasix intermitt compliance reported took this am 40mg lasix but otherwise hasn't taken it "not working" needs med reeval as well as screening duplex r/o dvt unlikely cellulitis, no sx to suggest hf will obtain screening labs eval kidney function likely dc home as full day hha and  at bedside agreed with plan and feels comfortable caring for her at home

## 2022-06-20 NOTE — ED PROVIDER NOTE - OBJECTIVE STATEMENT
70 y/o F with PMH Anxiety, Asthma, Breast cancer in situ, COPD not on home O2,. past DVT not presently on A/C, Graves disease, Hyperlipidemia, Hypertension, Hypothyroid, Parathyroidectomy, Kidney cancer, primary, with metastasis from kidney to other site, Left sided partial nephrectomy (no chemo or RT), Obesity presenting to the ED with wound to L lower leg. Patient states it initially appeared as a fluid filled blister which opened. She saw her doctor last week and was placed on antibiotics (she is unsure of which abx) and she has been taking for the past 6 days. She reports continued pain to the LLE and was instructed by her PMD to come to the ED for possible IV abx. She reports chronic edema in her lower extremities. No fever, chills, N/V.

## 2022-06-20 NOTE — ED PROVIDER NOTE - PROGRESS NOTE DETAILS
Patient reevaluated, U/S duplex is negative for DVT. Labs WNL. Lactate 2.3 but edematous, will defer fluids. Wound does not appear infected. Will arrange for transport back home. Artem Armstrong, DO PGY3

## 2022-06-20 NOTE — ED PROVIDER NOTE - PHYSICAL EXAMINATION
GENERAL: Awake, alert, NAD, obese  HEENT: NC/AT, moist mucous membranes  LUNGS: CTAB, no wheezes or crackles   CARDIAC: RRR, no m/r/g  ABDOMEN: Soft, normal BS, non tender, non distended, no rebound, no guarding  EXT: 3+ edema with peripheral vascular changes, 1 cm lesion to left lateral distal leg oozing blood, no calf tenderness, 2+ DP pulses bilaterally, no deformities, small area of erythema to proximal L thigh  NEURO: A&Ox3. Moving all extremities.  SKIN: Warm and dry.  PSYCH: Normal affect.

## 2022-06-20 NOTE — ED ADULT NURSE NOTE - NSIMPLEMENTINTERV_GEN_ALL_ED
Implemented All Fall with Harm Risk Interventions:  [place] to call system. Call bell, personal items and telephone within reach. Instruct patient to call for assistance. Room bathroom lighting operational. Non-slip footwear when patient is off stretcher. Physically safe environment: no spills, clutter or unnecessary equipment. Stretcher in lowest position, wheels locked, appropriate side rails in place. Provide visual cue, wrist band, yellow gown, etc. Monitor gait and stability. Monitor for mental status changes and reorient to person, place, and time. Review medications for side effects contributing to fall risk. Reinforce activity limits and safety measures with patient and family. Provide visual clues: red socks.

## 2022-06-20 NOTE — ED ADULT NURSE NOTE - OBJECTIVE STATEMENT
Pt was BIBEMS from home for wound check. PMH HTN, HLD, L kidney cancer, L breast CA, COPD. Pt states that yesterday she had a pea-size wound that it bursted and it was oozing water and blood. She called her MD today and was told to come to the Ed. Pt is AOx4. breathing unlabored on RA symmetrical rise and fall of the chest. Abdomen is soft and nondistended. Skin is warm and dry to touch. Bilateral LE swelling. Pt denies any CP, fever, chills, N/V. On stretcher at lowest position.

## 2022-06-20 NOTE — ED PROVIDER NOTE - PATIENT PORTAL LINK FT
You can access the FollowMyHealth Patient Portal offered by [facility] by registering at the following website: [email or web address]. By joining [facility]’s FollowMyHealth portal, you will also be able to view your health information using other applications (apps) compatible with our system.

## 2022-06-28 PROCEDURE — 99443: CPT | Mod: 95

## 2022-07-07 NOTE — PHYSICAL THERAPY INITIAL EVALUATION ADULT - WEIGHT-BEARING RESTRICTIONS: STAND/SIT, REHAB EVAL
c/o right ear pain. Endorse feeling un well x 3 days but ear pain developed today.  no PMH full weight-bearing

## 2022-07-15 NOTE — ED PROVIDER NOTE - NS ED MD DISPO ISOLATION TYPES
PROGRESS NOTE    Chief Complaint   Patient presents with    Follow-up     patient saw ENT, septoplasty deion for 10/11/22       SUBJECTIVE:     Rian Moody is a very pleasant 26 y.o. female with hx of  anxiety, migraine headache, seasonal allergies, recurrent strep throat, tonsillitis and sinusitis - currently following ENT, vitamin D deficiency, GERD, back pain, and arthritis, seen today in office for follow-up. Patient has been seen and evaluated by her ENT and was recommended for a septoplasty, tonsillectomy on 10/11/2022. Patient reports that she was started on a Singulair by her ENT. However, due to insurance coverage logistics, patient has been unable to start therapy. Patient reports that her sore throat is manageable but she started having a postnasal drip and dry coughing. She reports no fever or chills, no dysphagia, no chest pain, no shortness of breath, no rash development, no abdominal pain, no nausea, no vomiting, no diarrhea. Patient also has a history of anxiety currently taking Prozac for treatment. Patient reports she is only able to tolerate 10 mg of Prozac as a higher dose of medication was causing her to have side effects. She reports having continued anxiety and would like to be considered for additional therapy for treatment. Past Medical History, Past Surgical History, Family history, Social History, and Medications were all reviewed with the patient today and updated as necessary. Current Outpatient Medications   Medication Sig Dispense Refill    montelukast (SINGULAIR) 10 MG tablet Take 1 tablet by mouth nightly For allergies/postnasal drip 30 tablet 5    buPROPion (WELLBUTRIN XL) 150 MG extended release tablet Take 1 tablet by mouth every morning With food for anxiety/depression prevention 30 tablet 3    SLYND 4 MG TABS       azelastine (ASTELIN) 0.1 % nasal spray 1 SPRAY BY BOTH NOSTRILS ROUTE TWO (2) TIMES A DAY.  USE IN EACH NOSTRIL AS DIRECTED vitamin D (ERGOCALCIFEROL) 1.25 MG (35731 UT) CAPS capsule TAKE 1 CAPSULE BY MOUTH EVERY SEVEN (7) DAYS. WITH FOOD 5 capsule 2    omeprazole (PRILOSEC) 40 MG delayed release capsule Take 1 capsule by mouth daily At bedtime for stomach/reflux 30 capsule 5    tiZANidine (ZANAFLEX) 4 MG tablet Take 1 tablet by mouth nightly as needed (Muscle Spasm) May cause drowsiness 30 tablet 2    FLUoxetine (PROZAC) 10 MG capsule Take 10 mg by mouth daily      Rimegepant Sulfate (NURTEC) 75 MG TBDP Take 75 mg by mouth daily as needed       No current facility-administered medications for this visit. Allergies   Allergen Reactions    Sertraline Anxiety     Worsening symptoms with jaw clenching     Patient Active Problem List   Diagnosis    Back pain    Throat pain    Throat infection     History reviewed. No pertinent past medical history. Past Surgical History:   Procedure Laterality Date    CHOLECYSTECTOMY      GYN       Family History   Problem Relation Age of Onset    No Known Problems Mother     No Known Problems Father      Social History     Tobacco Use    Smoking status: Former    Smokeless tobacco: Never   Substance Use Topics    Alcohol use: Yes         REVIEW OF SYSTEM    Review of Systems   Constitutional: Negative. Negative for activity change, appetite change, chills, diaphoresis, fatigue, fever and unexpected weight change. HENT:  Positive for congestion, ear pain (ear fullness intermittently), postnasal drip and sinus pain. Negative for dental problem, drooling, ear discharge, facial swelling, hearing loss, mouth sores, nosebleeds, rhinorrhea, sinus pressure, sneezing, sore throat, tinnitus, trouble swallowing and voice change. Eyes: Negative. Negative for photophobia, pain, discharge, redness, itching and visual disturbance. Respiratory:  Positive for cough. Negative for apnea, choking, chest tightness, shortness of breath, wheezing and stridor. Cardiovascular: Negative.   Negative for chest pain, palpitations and leg swelling. Gastrointestinal: Negative. Negative for abdominal distention, abdominal pain, anal bleeding, blood in stool, constipation, diarrhea, nausea, rectal pain and vomiting. Endocrine: Negative. Negative for cold intolerance, heat intolerance, polydipsia, polyphagia and polyuria. Genitourinary: Negative. Negative for decreased urine volume, difficulty urinating, dysuria, enuresis, flank pain, frequency, genital sores, hematuria and urgency. Musculoskeletal: Negative. Negative for arthralgias, back pain, gait problem, joint swelling, myalgias, neck pain and neck stiffness. Skin: Negative. Negative for color change, pallor, rash and wound. Allergic/Immunologic: Negative. Negative for environmental allergies, food allergies and immunocompromised state. Neurological: Negative. Negative for dizziness, tremors, seizures, syncope, facial asymmetry, speech difficulty, weakness, light-headedness, numbness and headaches. Hematological: Negative. Negative for adenopathy. Does not bruise/bleed easily. Psychiatric/Behavioral:  Negative for agitation, behavioral problems, confusion, decreased concentration, dysphoric mood, hallucinations, self-injury, sleep disturbance and suicidal ideas. The patient is nervous/anxious. The patient is not hyperactive. OBJECTIVE:  /72 (Site: Right Upper Arm, Position: Sitting)   Pulse 82   Ht 5' 8\" (1.727 m)   Wt 213 lb (96.6 kg)   SpO2 98%   BMI 32.39 kg/m²      Physical Exam  Constitutional:       General: She is not in acute distress. Appearance: Normal appearance. She is not ill-appearing, toxic-appearing or diaphoretic. HENT:      Head: Normocephalic and atraumatic. Right Ear: Ear canal and external ear normal. There is no impacted cerumen. Left Ear: Ear canal and external ear normal. There is no impacted cerumen. Ears:      Comments: Bilateral ear with effusion.  No erythema, no bulging, no rupture and no active drainage or discharge     Nose:      Comments: Bilateral turbinates slightly pale in coloration. No visible polyp, no ulceration     Mouth/Throat:      Mouth: Mucous membranes are moist.      Pharynx: Oropharynx is clear. No oropharyngeal exudate or posterior oropharyngeal erythema. Comments: Positive for postnasal drip pattern  Eyes:      Extraocular Movements: Extraocular movements intact. Conjunctiva/sclera: Conjunctivae normal.      Pupils: Pupils are equal, round, and reactive to light. Neck:      Vascular: No carotid bruit. Cardiovascular:      Rate and Rhythm: Normal rate and regular rhythm. Pulses: Normal pulses. Heart sounds: Normal heart sounds. Pulmonary:      Effort: Pulmonary effort is normal. No respiratory distress. Breath sounds: Normal breath sounds. No stridor. No wheezing, rhonchi or rales. Chest:      Chest wall: No tenderness. Abdominal:      General: Abdomen is flat. Bowel sounds are normal. There is no distension. Palpations: Abdomen is soft. There is no shifting dullness, fluid wave, hepatomegaly, splenomegaly, mass or pulsatile mass. Tenderness: There is no abdominal tenderness. There is no right CVA tenderness, left CVA tenderness, guarding or rebound. Negative signs include Silva's sign, Rovsing's sign, McBurney's sign, psoas sign and obturator sign. Hernia: No hernia is present. Musculoskeletal:         General: Normal range of motion. Cervical back: Normal range of motion and neck supple. No rigidity or tenderness. Lymphadenopathy:      Cervical: No cervical adenopathy. Skin:     General: Skin is warm and dry. Capillary Refill: Capillary refill takes less than 2 seconds. Neurological:      General: No focal deficit present. Mental Status: She is alert and oriented to person, place, and time.    Psychiatric:         Mood and Affect: Mood normal.         Behavior: Behavior normal.         Thought Content: Thought content normal.         Judgment: Judgment normal.         Medical problems and test results were reviewed with the patient today. ASSESSMENT and PLAN    1. Sore throat  -     AMB POC RAPID STREP A    Rapid strep negative today in office. Likely sore throat is due to postnasal drip. Patient to continue with her daily Zyrtec, Flonase, Astelin, and Singulair. Due to insurance coverage logistics, patient has been unable to stay on Singulair. We will resend prescription into her pharmacy and patient was given a good Rx coupon to assist with cost.  Meanwhile, we will work with her insurance for additional prior authorization paperwork as needed to get medication approved. Patient reports she currently takes omeprazole daily for GERD treatment as possible cause of GERD induced sore throat. Patient to keep follow-up appointment with her ENT for treatment. Patient agrees to let me know if her symptoms worsened. She is to go to the ED if she has any chest pain or shortness of breath. 2. Post-nasal drip   As above. 3. Sinus pressure  -     montelukast (SINGULAIR) 10 MG tablet; Take 1 tablet by mouth nightly For allergies/postnasal drip, Disp-30 tablet, R-5Normal    As above. 4. Recurrent sinusitis  -     montelukast (SINGULAIR) 10 MG tablet; Take 1 tablet by mouth nightly For allergies/postnasal drip, Disp-30 tablet, R-5Normal    As above. 5. Generalized anxiety disorder  -     buPROPion (WELLBUTRIN XL) 150 MG extended release tablet; Take 1 tablet by mouth every morning With food for anxiety/depression prevention, Disp-30 tablet, R-3Normal    Patient currently takes Prozac 10 mg for preventative therapy. She reports control with use of medication but she is unable to tolerate a higher dose of medication due to side effects. We will have patient start Wellbutrin as additional therapy to boost efficacy of Prozac.     Orders Placed This Encounter   Procedures    AMB POC RAPID STREP A Elements of this note have been dictated using speech recognition software. As a result, errors of speech recognition may have occurred. On this date 7/15/2022 I have spent 30 minutes reviewing previous notes, test results and face to face with the patient discussing the diagnosis and importance of compliance with the treatment plan as well as documenting on the day of the visit. Greater than 50% of this visit was spent counseling the patient about test results, prognosis, importance of compliance, education about disease process, benefits of medications, instructions for management of acute symptoms, and follow up plans.     Return for Medication follow up ok to be virtual.     Arianna Maldonado, APRN - CNP by provider/IV discontinued, cath removed intact None

## 2022-07-27 PROCEDURE — 99443: CPT | Mod: 95

## 2022-07-29 NOTE — PHYSICAL THERAPY INITIAL EVALUATION ADULT - ASSISTIVE DEVICE FOR TRANSFER: SIT/STAND, REHAB EVAL
Continue Regimen: Winlevi 1 % topical cream Qd\\nQuantity: 60.0 g  Days Supply: 30\\nSig: Apply one time daily in the morning\\n\\nEpiduo Forte 0.3 %-2.5 % topical gel with pump QHS\\nQuantity: 60.0 g  Days Supply: 30\\nSig: Apply pea size at hs. Use every other night, working up to nightly Render In Strict Bullet Format?: No Detail Level: Zone rolling walker

## 2022-08-16 NOTE — ASU PREOP CHECKLIST - RESPIRATORY RATE (BREATHS/MIN)
Major Shift Events: Double lumen midline does not have blood return, attempts made by lab and writer. Lab unable to get labs with a poke x1, called for them to come try again, still waiting for them to be collected. Requiring 1 L nasal cannula at rest at times and with activity. Home O2 assessment completed, see note. Notified provider and talked to RN Care Coordinator about previous use of [employer] by patient for home O2 needs. Medication card updated. Discharge medications will be ready for tomorrow in discharge pharmacy. Patient's daughter will be here around 1300 tomorrow.   Plan: Hopeful discharge tomorrow.   For vital signs and complete assessments, please see documentation flowsheets.     Goal Outcome Evaluation:  Problem: Bowel Motility Impaired (Lung Surgery)  Goal: Effective Bowel Elimination  Intervention: Enhance Bowel Motility and Elimination  Recent Flowsheet Documentation  Taken 8/16/2022 1200 by Mary Bridges RN  Bowel Elimination Management:   hygiene measures promoted   relaxation techniques promoted  Taken 8/16/2022 0845 by Mary Bridges RN  Bowel Elimination Management:   hygiene measures promoted   relaxation techniques promoted     Problem: Pain (Lung Surgery)  Goal: Acceptable Pain Control  Outcome: Ongoing, Progressing  Intervention: Prevent or Manage Pain  Recent Flowsheet Documentation  Taken 8/16/2022 1341 by Mary Bridges RN  Pain Management Interventions: medication (see MAR)     Problem: Respiratory Compromise (Lung Surgery)  Goal: Effective Oxygenation and Ventilation  Outcome: Ongoing, Progressing  Intervention: Optimize Oxygenation and Ventilation  Recent Flowsheet Documentation  Taken 8/16/2022 1200 by Mary Bridges RN  Administration (IS): self-administered  Airway/Ventilation Management: pulmonary hygiene promoted  Head of Bed (HOB) Positioning: HOB at 20-30 degrees  Taken 8/16/2022 0845 by Mary Bridges RN  Administration (IS):  self-administered  Airway/Ventilation Management: pulmonary hygiene promoted   23

## 2022-08-18 ENCOUNTER — EMERGENCY (EMERGENCY)
Facility: HOSPITAL | Age: 72
LOS: 1 days | Discharge: HOME | End: 2022-08-18
Attending: STUDENT IN AN ORGANIZED HEALTH CARE EDUCATION/TRAINING PROGRAM
Payer: MEDICARE

## 2022-08-18 VITALS
RESPIRATION RATE: 19 BRPM | DIASTOLIC BLOOD PRESSURE: 60 MMHG | TEMPERATURE: 98 F | OXYGEN SATURATION: 94 % | SYSTOLIC BLOOD PRESSURE: 118 MMHG | HEART RATE: 92 BPM

## 2022-08-18 VITALS
SYSTOLIC BLOOD PRESSURE: 169 MMHG | TEMPERATURE: 98 F | WEIGHT: 259.93 LBS | HEART RATE: 88 BPM | HEIGHT: 60 IN | RESPIRATION RATE: 23 BRPM | DIASTOLIC BLOOD PRESSURE: 82 MMHG | OXYGEN SATURATION: 93 %

## 2022-08-18 DIAGNOSIS — Z98.89 OTHER SPECIFIED POSTPROCEDURAL STATES: Chronic | ICD-10-CM

## 2022-08-18 DIAGNOSIS — Z90.49 ACQUIRED ABSENCE OF OTHER SPECIFIED PARTS OF DIGESTIVE TRACT: Chronic | ICD-10-CM

## 2022-08-18 DIAGNOSIS — Z90.5 ACQUIRED ABSENCE OF KIDNEY: Chronic | ICD-10-CM

## 2022-08-18 LAB
ALBUMIN SERPL ELPH-MCNC: 4 G/DL — SIGNIFICANT CHANGE UP (ref 3.3–5)
ALP SERPL-CCNC: 105 U/L — SIGNIFICANT CHANGE UP (ref 40–120)
ALT FLD-CCNC: 15 U/L — SIGNIFICANT CHANGE UP (ref 10–45)
ANION GAP SERPL CALC-SCNC: 9 MMOL/L — SIGNIFICANT CHANGE UP (ref 5–17)
AST SERPL-CCNC: 11 U/L — SIGNIFICANT CHANGE UP (ref 10–40)
BASE EXCESS BLDV CALC-SCNC: 4.2 MMOL/L — HIGH (ref -2–3)
BASOPHILS # BLD AUTO: 0.05 K/UL — SIGNIFICANT CHANGE UP (ref 0–0.2)
BASOPHILS NFR BLD AUTO: 0.4 % — SIGNIFICANT CHANGE UP (ref 0–2)
BILIRUB SERPL-MCNC: 0.7 MG/DL — SIGNIFICANT CHANGE UP (ref 0.2–1.2)
BUN SERPL-MCNC: 14 MG/DL — SIGNIFICANT CHANGE UP (ref 7–23)
CA-I SERPL-SCNC: 1.28 MMOL/L — SIGNIFICANT CHANGE UP (ref 1.15–1.33)
CALCIUM SERPL-MCNC: 9.9 MG/DL — SIGNIFICANT CHANGE UP (ref 8.4–10.5)
CHLORIDE BLDV-SCNC: 104 MMOL/L — SIGNIFICANT CHANGE UP (ref 96–108)
CHLORIDE SERPL-SCNC: 104 MMOL/L — SIGNIFICANT CHANGE UP (ref 96–108)
CO2 BLDV-SCNC: 33 MMOL/L — HIGH (ref 22–26)
CO2 SERPL-SCNC: 30 MMOL/L — SIGNIFICANT CHANGE UP (ref 22–31)
CREAT SERPL-MCNC: 0.69 MG/DL — SIGNIFICANT CHANGE UP (ref 0.5–1.3)
CRP SERPL-MCNC: 6 MG/L — HIGH (ref 0–4)
EGFR: 92 ML/MIN/1.73M2 — SIGNIFICANT CHANGE UP
EOSINOPHIL # BLD AUTO: 0.22 K/UL — SIGNIFICANT CHANGE UP (ref 0–0.5)
EOSINOPHIL NFR BLD AUTO: 1.8 % — SIGNIFICANT CHANGE UP (ref 0–6)
ERYTHROCYTE [SEDIMENTATION RATE] IN BLOOD: 50 MM/HR — HIGH (ref 0–20)
GAS PNL BLDV: 139 MMOL/L — SIGNIFICANT CHANGE UP (ref 136–145)
GAS PNL BLDV: SIGNIFICANT CHANGE UP
GAS PNL BLDV: SIGNIFICANT CHANGE UP
GLUCOSE BLDV-MCNC: 112 MG/DL — HIGH (ref 70–99)
GLUCOSE SERPL-MCNC: 116 MG/DL — HIGH (ref 70–99)
HCO3 BLDV-SCNC: 31 MMOL/L — HIGH (ref 22–29)
HCT VFR BLD CALC: 44.1 % — SIGNIFICANT CHANGE UP (ref 34.5–45)
HCT VFR BLDA CALC: 41 % — SIGNIFICANT CHANGE UP (ref 34.5–46.5)
HGB BLD CALC-MCNC: 13.7 G/DL — SIGNIFICANT CHANGE UP (ref 11.7–16.1)
HGB BLD-MCNC: 13.6 G/DL — SIGNIFICANT CHANGE UP (ref 11.5–15.5)
IMM GRANULOCYTES NFR BLD AUTO: 0.6 % — SIGNIFICANT CHANGE UP (ref 0–1.5)
LACTATE BLDV-MCNC: 2.1 MMOL/L — HIGH (ref 0.5–2)
LYMPHOCYTES # BLD AUTO: 1.36 K/UL — SIGNIFICANT CHANGE UP (ref 1–3.3)
LYMPHOCYTES # BLD AUTO: 10.9 % — LOW (ref 13–44)
MCHC RBC-ENTMCNC: 30 PG — SIGNIFICANT CHANGE UP (ref 27–34)
MCHC RBC-ENTMCNC: 30.8 GM/DL — LOW (ref 32–36)
MCV RBC AUTO: 97.4 FL — SIGNIFICANT CHANGE UP (ref 80–100)
MONOCYTES # BLD AUTO: 0.87 K/UL — SIGNIFICANT CHANGE UP (ref 0–0.9)
MONOCYTES NFR BLD AUTO: 7 % — SIGNIFICANT CHANGE UP (ref 2–14)
NEUTROPHILS # BLD AUTO: 9.87 K/UL — HIGH (ref 1.8–7.4)
NEUTROPHILS NFR BLD AUTO: 79.3 % — HIGH (ref 43–77)
NRBC # BLD: 0 /100 WBCS — SIGNIFICANT CHANGE UP (ref 0–0)
PCO2 BLDV: 55 MMHG — HIGH (ref 39–42)
PH BLDV: 7.36 — SIGNIFICANT CHANGE UP (ref 7.32–7.43)
PLATELET # BLD AUTO: 172 K/UL — SIGNIFICANT CHANGE UP (ref 150–400)
PO2 BLDV: 41 MMHG — SIGNIFICANT CHANGE UP (ref 25–45)
POTASSIUM BLDV-SCNC: 4.5 MMOL/L — SIGNIFICANT CHANGE UP (ref 3.5–5.1)
POTASSIUM SERPL-MCNC: 4.3 MMOL/L — SIGNIFICANT CHANGE UP (ref 3.5–5.3)
POTASSIUM SERPL-SCNC: 4.3 MMOL/L — SIGNIFICANT CHANGE UP (ref 3.5–5.3)
PROT SERPL-MCNC: 7.2 G/DL — SIGNIFICANT CHANGE UP (ref 6–8.3)
RBC # BLD: 4.53 M/UL — SIGNIFICANT CHANGE UP (ref 3.8–5.2)
RBC # FLD: 15.1 % — HIGH (ref 10.3–14.5)
SAO2 % BLDV: 70.7 % — SIGNIFICANT CHANGE UP (ref 67–88)
SODIUM SERPL-SCNC: 143 MMOL/L — SIGNIFICANT CHANGE UP (ref 135–145)
WBC # BLD: 12.44 K/UL — HIGH (ref 3.8–10.5)
WBC # FLD AUTO: 12.44 K/UL — HIGH (ref 3.8–10.5)

## 2022-08-18 PROCEDURE — 86140 C-REACTIVE PROTEIN: CPT

## 2022-08-18 PROCEDURE — 12001 RPR S/N/AX/GEN/TRNK 2.5CM/<: CPT

## 2022-08-18 PROCEDURE — 99283 EMERGENCY DEPT VISIT LOW MDM: CPT | Mod: 25

## 2022-08-18 PROCEDURE — 80053 COMPREHEN METABOLIC PANEL: CPT

## 2022-08-18 PROCEDURE — 84132 ASSAY OF SERUM POTASSIUM: CPT

## 2022-08-18 PROCEDURE — 73590 X-RAY EXAM OF LOWER LEG: CPT

## 2022-08-18 PROCEDURE — 82803 BLOOD GASES ANY COMBINATION: CPT

## 2022-08-18 PROCEDURE — 82947 ASSAY GLUCOSE BLOOD QUANT: CPT

## 2022-08-18 PROCEDURE — 99284 EMERGENCY DEPT VISIT MOD MDM: CPT | Mod: 25

## 2022-08-18 PROCEDURE — 85652 RBC SED RATE AUTOMATED: CPT

## 2022-08-18 PROCEDURE — 83605 ASSAY OF LACTIC ACID: CPT

## 2022-08-18 PROCEDURE — 85014 HEMATOCRIT: CPT

## 2022-08-18 PROCEDURE — 85025 COMPLETE CBC W/AUTO DIFF WBC: CPT

## 2022-08-18 PROCEDURE — 85018 HEMOGLOBIN: CPT

## 2022-08-18 PROCEDURE — 82330 ASSAY OF CALCIUM: CPT

## 2022-08-18 PROCEDURE — 82435 ASSAY OF BLOOD CHLORIDE: CPT

## 2022-08-18 PROCEDURE — 84295 ASSAY OF SERUM SODIUM: CPT

## 2022-08-18 PROCEDURE — 73590 X-RAY EXAM OF LOWER LEG: CPT | Mod: 26,50

## 2022-08-18 NOTE — ED PROVIDER NOTE - ATTENDING CONTRIBUTION TO CARE
I, Diego Pimentel, performed a history and physical exam of the patient and discussed their management with the resident and /or advanced care provider. I reviewed the resident and /or ACP's note and agree with the documented findings and plan of care. I was present and available for all procedures.    This is a 71-year-old female with a past medical history of COPD, DVT not on AC, chronic lymphedema who presents with a laceration to the inner aspect of the right lower leg after walking with a walker.  Patient states that she up-to-date with tetanus states that she has difficulty initially with bleeding but ultimately was able to control it.  Exam notable for a C-shaped small 1 cm laceration without any active bleeding.  Patient Dors that she has chronic redness and chronic skin changes of the bilateral lower extremities at this time we will order an x-ray, basic labs and repair laceration.

## 2022-08-18 NOTE — ED PROVIDER NOTE - PATIENT PORTAL LINK FT
You can access the FollowMyHealth Patient Portal offered by [facility] by registering at the following website: [email or web address]. By joining [employer]’s FollowMyHealth portal, you will also be able to view your health information using other applications (apps) compatible with our system.

## 2022-08-18 NOTE — ED PROVIDER NOTE - OBJECTIVE STATEMENT
Patient is a 71 year-old-female with history of COPD (not on home O2), hx of DVT not on AC, Graves disease, HLD, HTN, hypothyroidism, breast cancer in situ, parathyroidectomy, kidney cancer w/mets s/p L partial nephrectomy (not on chemo/RT), obesity and lymphedema presents with bleeding from RLE. Patient states that she has been having drainage from the LLE which has been followed with PMD in the past few weeks and dressed with gauze, s/p antibiotics. Then today, she knocked her RLE against the wheelchair and it started bleeding. Bleeding has stopped after compression. Denies fever, chills, nausea, vomiting, abdominal pain, chest pain, shortness of breath, urinary/bowel complaints. Use a wheelchair to get around at home, has an aide at home. Aide and  both help the patient with wound care. L lower extremity wound appears the same to the  at bedside. Patient is a 71 year-old-female with history of COPD (not on home O2), hx of DVT not on AC, Graves disease, HLD, HTN, hypothyroidism, breast cancer in situ, parathyroidectomy, kidney cancer w/mets s/p L partial nephrectomy (not on chemo/RT), obesity and lymphedema presents with bleeding from RLE. Patient states that she has been having drainage from the LLE which has been followed with PMD in the past few weeks and dressed with gauze, s/p antibiotics. Then today, she knocked her RLE against the wheelchair and it started bleeding. Bleeding has stopped after compression. Denies fever, chills, nausea, vomiting, abdominal pain, chest pain, shortness of breath, urinary/bowel complaints. Use a wheelchair to get around at home, has an aide at home. Aide and  both help the patient with wound care. L lower extremity wound appears the same to the  at bedside. Received tetanus shot in Nov 2019.

## 2022-08-18 NOTE — ED PROVIDER NOTE - NSFOLLOWUPINSTRUCTIONS_ED_ALL_ED_FT
You were seen in the emergency department for wound in the right leg.     Please follow up with your primary care doctor within 48 hours for continuation of care.     Return to the emergency department if you experience any new/concerning/worsening symptoms.

## 2022-08-18 NOTE — ED PROVIDER NOTE - PROGRESS NOTE DETAILS
[facility] PGY2  Bloodwork largely unremarkable. Xray negative for foreign body or signs of osteo.   Wound dressed with durmaband and sterile strip.   DC with PMD follow up for wound care.

## 2022-08-18 NOTE — ED PROCEDURE NOTE - ATTENDING CONTRIBUTION TO CARE
I, Diego Pimentel, performed a history and physical exam of the patient and discussed their management with the resident and /or advanced care provider. I reviewed the resident and /or ACP's note and agree with the documented findings and plan of care. I was present and available for all procedures.

## 2022-08-18 NOTE — ED PROVIDER NOTE - CLINICAL SUMMARY MEDICAL DECISION MAKING FREE TEXT BOX
Patient is a 71 year-old-female with multiple co-morbidities and chronic lymphedema presents with bleeding in the R lower extremity from a cut and also persistent wound in the L lower extremity. Bleeding in the R lower extremity has stopped. Will dress appropriately. Wound in the L lower extremity appears intact and not actively infectious. Will obtain labs and inflammatory markers to evaluate for possible underlying infection though low suspicion. Will also obtain Xray of LE to evaluate for foreign body in the R lower extremity and osteo in the L lower extremity though low suspicion.

## 2022-08-18 NOTE — ED ADULT NURSE NOTE - OBJECTIVE STATEMENT
received pt in assigned room a&ox3 pt has chronic lower extremity lymphedema, with dressings noted to both extremities, pt states she noticed bleeding from the RLE & leakage from the LLE, denies any pus, fever or chills, states she has an aide that helps her with dressing changes & may have accidentally hit her legs with her walker, resps even and non labored, IVL placed labs sent, afebrile,  at bedside, will continue to monitor pt.

## 2022-08-19 NOTE — ED POST DISCHARGE NOTE - RESULT SUMMARY
elevated ESR 50 consistent with pt diagnosed with lower ext wound/cellulitis with multiple medical comorbidities. no urgent need for callback at this time - Joon Gar PA-C

## 2022-08-22 ENCOUNTER — APPOINTMENT (OUTPATIENT)
Dept: GASTROENTEROLOGY | Facility: CLINIC | Age: 72
End: 2022-08-22

## 2022-08-24 PROCEDURE — 99443: CPT | Mod: 95

## 2022-08-25 NOTE — PHYSICAL THERAPY INITIAL EVALUATION ADULT - GENERAL OBSERVATIONS, REHAB EVAL
How Severe Is Your Rash?: mild Is This A New Presentation, Or A Follow-Up?: Rash Additional History: Patient would also like a FBSE Pt. received reclined in bed

## 2022-09-06 NOTE — BEHAVIORAL HEALTH ASSESSMENT NOTE - AFFECT RANGE
Nataliia Persaud called requesting a refill of the below medication which has been pended for you:     Requested Prescriptions     Pending Prescriptions Disp Refills    rosuvastatin (CRESTOR) 10 MG tablet [Pharmacy Med Name: ROSUVASTATIN TABS 10MG] 90 tablet 1     Sig: TAKE 1 TABLET NIGHTLY       Last Appointment Date: 4/11/2022  Next Appointment Date: 10/12/2022    No Known Allergies Labile

## 2022-09-26 NOTE — H&P ADULT - NSICDXFAMILYHX_GEN_ALL_CORE_FT
Never smoker
FAMILY HISTORY:  Family history of hypertension, mother and father  Family history of myocardial infarction, mother  at age 67 and father at age 67 of MI's    Sibling  Still living? Yes, Estimated age: Age Unknown  Family history of diabetes mellitus, Age at diagnosis: Age Unknown  Family history of heart disease, Age at diagnosis: Age Unknown    Child  Still living? Yes, Estimated age: Age Unknown  Family history of thyroid cancer, Age at diagnosis: Age Unknown

## 2022-10-03 NOTE — BEHAVIORAL HEALTH ASSESSMENT NOTE - OTHER
0 Mostly cooperative but hostile towards psychiatric intervention not observed Paranoid ideation evidenced without any behavioral response to said paranoid unable to formally assess Limited judgement due to poor insight Pt with subacute to chronic symptomatic mood/psychosis in the setting of loss of psychiatric provider, loss of medication access and decline in respiratory health.

## 2022-11-03 ENCOUNTER — APPOINTMENT (OUTPATIENT)
Dept: GASTROENTEROLOGY | Facility: CLINIC | Age: 72
End: 2022-11-03

## 2022-11-10 ENCOUNTER — APPOINTMENT (OUTPATIENT)
Dept: GASTROENTEROLOGY | Facility: CLINIC | Age: 72
End: 2022-11-10

## 2022-11-10 VITALS
SYSTOLIC BLOOD PRESSURE: 128 MMHG | DIASTOLIC BLOOD PRESSURE: 73 MMHG | TEMPERATURE: 98 F | HEIGHT: 60 IN | RESPIRATION RATE: 18 BRPM | HEART RATE: 88 BPM | OXYGEN SATURATION: 90 %

## 2022-11-10 DIAGNOSIS — F33.9 MAJOR DEPRESSIVE DISORDER, RECURRENT, UNSPECIFIED: ICD-10-CM

## 2022-11-10 DIAGNOSIS — R13.10 DYSPHAGIA, UNSPECIFIED: ICD-10-CM

## 2022-11-10 PROCEDURE — 99214 OFFICE O/P EST MOD 30 MIN: CPT

## 2022-11-10 NOTE — ASSESSMENT
[FreeTextEntry1] : Irritable bowel syndrome with diarrhea. Dietary and lifestyle modification discussed with the patient.  We will start Xifaxan 330 mg 3 times daily for 14 days.  This was discussed with the patient.  He understands and all questions were answered.\par Dysphagia.  Improved with dietary modification.\par

## 2022-11-10 NOTE — PHYSICAL EXAM
[Cervical Lymph Nodes Enlarged Posterior Bilaterally] : no posterior cervical lymphadenopathy [No CVA Tenderness] : no CVA  tenderness [Abnormal Walk] : normal gait [Normal] : oriented to person, place, and time

## 2022-11-10 NOTE — HISTORY OF PRESENT ILLNESS
[FreeTextEntry1] : 71-year old woman with dysphagia.  Her dysphagia was specifically to rice.  Since she has removed rice from her diet she has no further problem with swallowing.  She also complains of episodes of diarrhea.  She denies rectal bleeding, melena or hematemesis.  EGD and biopsy and colonoscopy performed on September 3, 2021 was unremarkable.  She takes Imodium for the diarrhea with good response.

## 2022-11-14 PROCEDURE — 99443: CPT | Mod: 95

## 2023-01-03 ENCOUNTER — INPATIENT (INPATIENT)
Facility: HOSPITAL | Age: 73
LOS: 8 days | Discharge: HOME HEALTH | DRG: 299 | End: 2023-01-12
Attending: GENERAL PRACTICE | Admitting: GENERAL PRACTICE
Payer: MEDICARE

## 2023-01-03 VITALS
OXYGEN SATURATION: 96 % | RESPIRATION RATE: 18 BRPM | DIASTOLIC BLOOD PRESSURE: 71 MMHG | TEMPERATURE: 98 F | SYSTOLIC BLOOD PRESSURE: 151 MMHG | HEART RATE: 74 BPM | WEIGHT: 220.02 LBS | HEIGHT: 68 IN

## 2023-01-03 DIAGNOSIS — Z98.89 OTHER SPECIFIED POSTPROCEDURAL STATES: Chronic | ICD-10-CM

## 2023-01-03 DIAGNOSIS — R60.0 LOCALIZED EDEMA: ICD-10-CM

## 2023-01-03 DIAGNOSIS — Z90.49 ACQUIRED ABSENCE OF OTHER SPECIFIED PARTS OF DIGESTIVE TRACT: Chronic | ICD-10-CM

## 2023-01-03 DIAGNOSIS — Z90.5 ACQUIRED ABSENCE OF KIDNEY: Chronic | ICD-10-CM

## 2023-01-03 LAB
ALBUMIN SERPL ELPH-MCNC: 3.6 G/DL — SIGNIFICANT CHANGE UP (ref 3.3–5)
ALP SERPL-CCNC: 97 U/L — SIGNIFICANT CHANGE UP (ref 40–120)
ALT FLD-CCNC: 12 U/L — SIGNIFICANT CHANGE UP (ref 10–45)
ANION GAP SERPL CALC-SCNC: 11 MMOL/L — SIGNIFICANT CHANGE UP (ref 5–17)
AST SERPL-CCNC: 12 U/L — SIGNIFICANT CHANGE UP (ref 10–40)
BASE EXCESS BLDV CALC-SCNC: 5.2 MMOL/L — HIGH (ref -2–3)
BASOPHILS # BLD AUTO: 0.05 K/UL — SIGNIFICANT CHANGE UP (ref 0–0.2)
BASOPHILS NFR BLD AUTO: 0.5 % — SIGNIFICANT CHANGE UP (ref 0–2)
BILIRUB SERPL-MCNC: 0.7 MG/DL — SIGNIFICANT CHANGE UP (ref 0.2–1.2)
BUN SERPL-MCNC: 18 MG/DL — SIGNIFICANT CHANGE UP (ref 7–23)
CA-I SERPL-SCNC: 1.3 MMOL/L — SIGNIFICANT CHANGE UP (ref 1.15–1.33)
CALCIUM SERPL-MCNC: 9.9 MG/DL — SIGNIFICANT CHANGE UP (ref 8.4–10.5)
CHLORIDE BLDV-SCNC: 100 MMOL/L — SIGNIFICANT CHANGE UP (ref 96–108)
CHLORIDE SERPL-SCNC: 101 MMOL/L — SIGNIFICANT CHANGE UP (ref 96–108)
CO2 BLDV-SCNC: 34 MMOL/L — HIGH (ref 22–26)
CO2 SERPL-SCNC: 29 MMOL/L — SIGNIFICANT CHANGE UP (ref 22–31)
CREAT SERPL-MCNC: 0.87 MG/DL — SIGNIFICANT CHANGE UP (ref 0.5–1.3)
EGFR: 70 ML/MIN/1.73M2 — SIGNIFICANT CHANGE UP
EOSINOPHIL # BLD AUTO: 0.21 K/UL — SIGNIFICANT CHANGE UP (ref 0–0.5)
EOSINOPHIL NFR BLD AUTO: 1.9 % — SIGNIFICANT CHANGE UP (ref 0–6)
FLUAV AG NPH QL: SIGNIFICANT CHANGE UP
FLUBV AG NPH QL: SIGNIFICANT CHANGE UP
GAS PNL BLDV: 137 MMOL/L — SIGNIFICANT CHANGE UP (ref 136–145)
GAS PNL BLDV: SIGNIFICANT CHANGE UP
GAS PNL BLDV: SIGNIFICANT CHANGE UP
GLUCOSE BLDV-MCNC: 98 MG/DL — SIGNIFICANT CHANGE UP (ref 70–99)
GLUCOSE SERPL-MCNC: 103 MG/DL — HIGH (ref 70–99)
HCO3 BLDV-SCNC: 32 MMOL/L — HIGH (ref 22–29)
HCT VFR BLD CALC: 42.1 % — SIGNIFICANT CHANGE UP (ref 34.5–45)
HCT VFR BLDA CALC: 40 % — SIGNIFICANT CHANGE UP (ref 34.5–46.5)
HGB BLD CALC-MCNC: 13.3 G/DL — SIGNIFICANT CHANGE UP (ref 11.7–16.1)
HGB BLD-MCNC: 12.9 G/DL — SIGNIFICANT CHANGE UP (ref 11.5–15.5)
IMM GRANULOCYTES NFR BLD AUTO: 0.2 % — SIGNIFICANT CHANGE UP (ref 0–0.9)
LACTATE BLDV-MCNC: 1.6 MMOL/L — SIGNIFICANT CHANGE UP (ref 0.5–2)
LYMPHOCYTES # BLD AUTO: 1.57 K/UL — SIGNIFICANT CHANGE UP (ref 1–3.3)
LYMPHOCYTES # BLD AUTO: 14.4 % — SIGNIFICANT CHANGE UP (ref 13–44)
MAGNESIUM SERPL-MCNC: 1.9 MG/DL — SIGNIFICANT CHANGE UP (ref 1.6–2.6)
MCHC RBC-ENTMCNC: 29.5 PG — SIGNIFICANT CHANGE UP (ref 27–34)
MCHC RBC-ENTMCNC: 30.6 GM/DL — LOW (ref 32–36)
MCV RBC AUTO: 96.3 FL — SIGNIFICANT CHANGE UP (ref 80–100)
MONOCYTES # BLD AUTO: 1.02 K/UL — HIGH (ref 0–0.9)
MONOCYTES NFR BLD AUTO: 9.3 % — SIGNIFICANT CHANGE UP (ref 2–14)
NEUTROPHILS # BLD AUTO: 8.04 K/UL — HIGH (ref 1.8–7.4)
NEUTROPHILS NFR BLD AUTO: 73.7 % — SIGNIFICANT CHANGE UP (ref 43–77)
NRBC # BLD: 0 /100 WBCS — SIGNIFICANT CHANGE UP (ref 0–0)
NT-PROBNP SERPL-SCNC: 250 PG/ML — SIGNIFICANT CHANGE UP (ref 0–300)
PCO2 BLDV: 57 MMHG — HIGH (ref 39–42)
PH BLDV: 7.36 — SIGNIFICANT CHANGE UP (ref 7.32–7.43)
PLATELET # BLD AUTO: 183 K/UL — SIGNIFICANT CHANGE UP (ref 150–400)
PO2 BLDV: 69 MMHG — HIGH (ref 25–45)
POTASSIUM BLDV-SCNC: 3.9 MMOL/L — SIGNIFICANT CHANGE UP (ref 3.5–5.1)
POTASSIUM SERPL-MCNC: 4 MMOL/L — SIGNIFICANT CHANGE UP (ref 3.5–5.3)
POTASSIUM SERPL-SCNC: 4 MMOL/L — SIGNIFICANT CHANGE UP (ref 3.5–5.3)
PROT SERPL-MCNC: 6.5 G/DL — SIGNIFICANT CHANGE UP (ref 6–8.3)
RBC # BLD: 4.37 M/UL — SIGNIFICANT CHANGE UP (ref 3.8–5.2)
RBC # FLD: 15.4 % — HIGH (ref 10.3–14.5)
RSV RNA NPH QL NAA+NON-PROBE: SIGNIFICANT CHANGE UP
SAO2 % BLDV: 91 % — HIGH (ref 67–88)
SARS-COV-2 RNA SPEC QL NAA+PROBE: SIGNIFICANT CHANGE UP
SODIUM SERPL-SCNC: 141 MMOL/L — SIGNIFICANT CHANGE UP (ref 135–145)
TROPONIN T, HIGH SENSITIVITY RESULT: 31 NG/L — SIGNIFICANT CHANGE UP (ref 0–51)
TROPONIN T, HIGH SENSITIVITY RESULT: 32 NG/L — SIGNIFICANT CHANGE UP (ref 0–51)
WBC # BLD: 10.91 K/UL — HIGH (ref 3.8–10.5)
WBC # FLD AUTO: 10.91 K/UL — HIGH (ref 3.8–10.5)

## 2023-01-03 PROCEDURE — 99285 EMERGENCY DEPT VISIT HI MDM: CPT | Mod: FS

## 2023-01-03 PROCEDURE — 99223 1ST HOSP IP/OBS HIGH 75: CPT

## 2023-01-03 PROCEDURE — 71045 X-RAY EXAM CHEST 1 VIEW: CPT | Mod: 26

## 2023-01-03 RX ORDER — ACETAMINOPHEN 500 MG
650 TABLET ORAL EVERY 6 HOURS
Refills: 0 | Status: DISCONTINUED | OUTPATIENT
Start: 2023-01-03 | End: 2023-01-12

## 2023-01-03 RX ORDER — ACETAMINOPHEN 500 MG
1000 TABLET ORAL ONCE
Refills: 0 | Status: COMPLETED | OUTPATIENT
Start: 2023-01-03 | End: 2023-01-03

## 2023-01-03 RX ORDER — FUROSEMIDE 40 MG
40 TABLET ORAL ONCE
Refills: 0 | Status: COMPLETED | OUTPATIENT
Start: 2023-01-03 | End: 2023-01-03

## 2023-01-03 RX ADMIN — Medication 400 MILLIGRAM(S): at 19:39

## 2023-01-03 RX ADMIN — Medication 40 MILLIGRAM(S): at 19:38

## 2023-01-03 RX ADMIN — Medication 1000 MILLIGRAM(S): at 20:10

## 2023-01-03 NOTE — ED PROVIDER NOTE - NS ED ATTENDING STATEMENT MOD
This was a shared visit with the LALI. I reviewed and verified the documentation and independently performed the documented:

## 2023-01-03 NOTE — H&P ADULT - ASSESSMENT
72F w/ hx of kidney cancer c/b ?mets (s/p L partial nephrectomy, no chemo/RT), breast cancer in situ, prior DVT (not on AC), COPD (not on home O2), asthma, former smoker, anxiety, IBS with chronic diarrhea, Graves disease, Hypothyroidism, parathyroidectomy, HTN, HLD, NOEMY, obesity, venous insufficiency, prior COVID-19 infection (Jan 2021), presenting with worsening of chronic b/l LE swelling x2 weeks as well as development of LUE swelling x1 month. Associated intermittent SOB and chest pressure/heaviness. Suspect worsening of chronic lymphedema/venous insufficiency in setting of diuretic non-adherence vs less likely CHF vs thyroid-related

## 2023-01-03 NOTE — H&P ADULT - PROBLEM SELECTOR PLAN 3
Pt reported development of new resting tremors in b/l UEs over the past month. Unclear etiology, possibly 2/2 extrapyramidal symptoms from olanzapine vs development of Parkinsons vs ?side effect of gabapentin  - outpatient Neurology follow-up

## 2023-01-03 NOTE — ED PROVIDER NOTE - CLINICAL SUMMARY MEDICAL DECISION MAKING FREE TEXT BOX
N[place] [age]-year-old female with history of COPD (not on home O2), history of DVT not on AC, Graves' disease, HLD, HTN, hypothyroidism, breast cancer in situ, parathyroidectomy, kidney disease with mets status post left partial nephrectomy (on chemo/RT), obesity and lymphedema presenting to the ED for lower extremity swelling progressively worsening over the past 2 weeks.  Patient is wheelchair-bound at baseline reports that she has lower extremity swelling at baseline on 40 mg Lasix once daily.  Has been having progressively worsening swelling of the lower extremities with pain over the past 2 weeks.  Notes that over this time span has also been having swelling in the left upper extremity.  Complaining of intermittent chest pain or shortness of breath over this period of time.  Presenting on 3 L nasal cannula toxic on room air per EMS.  No palpitations, loss of consciousness, abdominal pain nausea vomiting.  Patient in no acute distress vitals within normal limits.  Chronically ill-appearing.  Awake and oriented.  Regular rate and rhythm, lungs clear to auscultation bilaterally.  Abdomen soft nontender no rebound or guarding.  Bilateral pitting edema to lower extremities.  Swelling to the left upper extremity.  Tenderness on exam.  Concern for CHF exacerbation.  Will give dose of IV Lasix, labs cardiac enzymes EKG nonischemic, telemetry monitoring and likely admission.

## 2023-01-03 NOTE — H&P ADULT - PROBLEM SELECTOR PLAN 2
Hx of hypothyroidism, Graves disease, and s/p parathyroidectomy.  - c/w home levothyroxine  - f/u TSH/FT4

## 2023-01-03 NOTE — H&P ADULT - PROBLEM SELECTOR PLAN 4
Hx of kidney cancer c/b ?mets (s/p L partial nephrectomy, no chemo/RT). Of note, pt denied presence of mets and only noted hx of other cancer diagnoses (breast cancer and skin cancer).  - on admission, SCr 0.87 (baseline SCr ~0.6-0.8 in April 2022)  - monitor SCr and electrolytes; avoid nephrotoxins as able given solitary kidney

## 2023-01-03 NOTE — H&P ADULT - PROBLEM SELECTOR PLAN 6
Hx of COPD/asthma (not on home O2). Pt reported some intermittent SOB but no wheezes appreciated on exam. Denied cough.  - Does not appear to be in COPD/asthma exacerbation at this time  - c/w home montelukast, prednisone, and Spiriva inhaler  - c/w duonebs q6h PRN  - wean off supplemental O2 as tolerated Hx of COPD/asthma (not on home O2). Pt reported some intermittent SOB but no wheezes appreciated on exam. Denied cough.  - Does not appear to be in COPD/asthma exacerbation at this time  - c/w home montelukast, prednisone, and Spiriva inhaler  - c/w duonebs q6h PRN  - wean off supplemental O2 as tolerated, target O2 sat of 88-92% in setting of COPD hx

## 2023-01-03 NOTE — H&P ADULT - NSHPREVIEWOFSYSTEMS_GEN_ALL_CORE
REVIEW OF SYSTEMS:    CONSTITUTIONAL: No fevers or chills  EYES:  No visual changes or eye pain  ENMT: No hearing loss or sore throat  RESPIRATORY: No cough, wheezing, hemoptysis; +SOB  CARDIOVASCULAR: No palpitations; No chest pain, but heaviness/pressure; +worsened b/l LE swelling, +LUE swelling  GASTROINTESTINAL: No abdominal or epigastric pain; No nausea, vomiting, or hematemesis; +chronic diarrhea; No constipation; No melena or hematochezia  GENITOURINARY: No dysuria or hematuria  MUSCULOSKELETAL: No back pain; No joint tenderness  NEUROLOGICAL: No numbness or loss of sensation; No loss of strength in extremities  PSYCHIATRIC: + anxiety, +depression  SKIN: wound on R thigh; No itching, burning, rashes, or other lesions

## 2023-01-03 NOTE — H&P ADULT - HISTORY OF PRESENT ILLNESS
72F w/ hx of metastatic kidney cancer s/p L partial nephrectomy (no chemo/RT), breast cancer in situ, prior DVT (not on AC), COPD (not on home O2), asthma, former smoker, anxiety, IBS with chronic diarrhea, Graves disease, Hypothyroidism, parathyroidectomy, HTN, HLD, NOEMY, obesity, prior COVID-19 infection (Jan 2021), presenting with worsening b/l LE swelling x2 weeks. Also noted development of LUE swelling and reported intermittent SOB and chest heaviness as well over the past two weeks. Extremity swelling is associated with pain/tenderness. Otherwise, denied f/c/n/v, CP, abdominal pain, dysuria, hematuria, hematochezia, melena, diarrhea, constipation. Pt reported being adherent to her Lasix 40mg PO daily diuretic regimen. Pt is wheelchair bound at baseline.     In ED: Afebrile, HR 60s-70s, SBP 130s-150s, RR 16-18, sating 96-98% on 3L O2NC. labs notable for WBC 10.9k, hsTrop 31->32, lactate wnl. CXR (prelim) was clear. Given Tylenol 1g IV and Lasix 40mg IV. Admitted to Medicine for further management. 72F w/ hx of metastatic kidney cancer s/p L partial nephrectomy (no chemo/RT), breast cancer in situ, prior DVT (not on AC), COPD (not on home O2), asthma, former smoker, anxiety, IBS with chronic diarrhea, Graves disease, Hypothyroidism, parathyroidectomy, HTN, HLD, NOEMY, obesity, venous insufficiency, prior COVID-19 infection (Jan 2021), presenting with worsening b/l LE swelling x2 weeks. Also noted development of LUE swelling and reported intermittent SOB and chest heaviness as well over the past two weeks. Extremity swelling is associated with pain/tenderness. Otherwise, denied f/c/n/v, CP, abdominal pain, dysuria, hematuria, hematochezia, melena, diarrhea, constipation. Pt reported being adherent to her Lasix 40mg PO daily diuretic regimen. Pt is wheelchair bound at baseline.     In ED: Afebrile, HR 60s-70s, SBP 130s-150s, RR 16-18, sating 96-98% on 3L O2NC. labs notable for WBC 10.9k, hsTrop 31->32, serum proBNP 250, lactate wnl. CXR (prelim) was clear. Given Tylenol 1g IV and Lasix 40mg IV. Admitted to Medicine for further management. 72F w/ hx of kidney cancer c/b ?mets (s/p L partial nephrectomy, no chemo/RT), breast cancer in situ, prior DVT (not on AC), COPD (not on home O2), asthma, former smoker, anxiety, IBS with chronic diarrhea, Graves disease, Hypothyroidism, parathyroidectomy, HTN, HLD, NOEMY, obesity, venous insufficiency, prior COVID-19 infection (Jan 2021), presenting with worsening of chronic b/l LE swelling x2 weeks. Also reported intermittent SOB and chest heaviness/pressure as well over the past two weeks. Stated she has also developed LUE swelling and b/l UE resting tremors over the past month. Swelling of extremities is associated with pain/tenderness. The b/l leg swelling has been present for years, but recently has been getting worse to the point where she is blisters and water coming out of the skin. Noted that everything that she has tried has not worked to improve the swelling in her legs (including ace bandage, leg elevation, leg compression machine). Pt reported that she stopped her Lasix 40mg PO daily medication about a month ago because all it did was make her uncomfortable with frequent urination without reducing the swelling. Has chronic diarrhea from IBS. Otherwise, denied f/c/n/v, CP, abdominal pain, dysuria, hematuria, hematochezia, melena, constipation. Pt is wheelchair bound at baseline.     In ED: Afebrile, HR 60s-70s, SBP 130s-150s, RR 16-18, sating 96-98% on 3L O2NC. labs notable for WBC 10.9k, hsTrop 31->32, serum proBNP 250, lactate wnl. CXR (prelim) was clear. Given Tylenol 1g IV and Lasix 40mg IV. Admitted to Medicine for further management.

## 2023-01-03 NOTE — ED ADULT NURSE REASSESSMENT NOTE - NS ED NURSE REASSESS COMMENT FT1
PT moved into a room, placed on wall oxygen, placed on a primafit with new diaper and chux applied, and placed on cardiac monitor. Safety and comfort maintained.

## 2023-01-03 NOTE — ED ADULT NURSE NOTE - OBJECTIVE STATEMENT
PT is a 72 year old A&OX4 female with PMH of cellulitis, hypothyroidism, COPD/emphysema, breast cancer, HLD, osteoarthritis, and asthma who presents to the ED via EMS from home with c/o B/L LE edema since yesterday and SOB for a few weeks. PT has an appointment with her PCP/cardiologist tomorrow but was unable to wait due to the pain. PT also states she usually can maneuver from her wheelchair to her bed by herself but was unable to last night. Per EMS, PT hypoxic on room air, placed on 3L nasal cannula at sating at 95% now. PT denies use of oxygen at baseline. PT denies use of OTC pain relievers. PT denies N/V/D, chest pain, dizziness, chills, cough, and fevers at home. PT is resting comfortably in bed, breathing on 3L nasal cannula, and speaking in complete sentences. Abdomen is soft, non-tender, and non-distended. Skin is warm and dry, no diaphoresis noted. Severe edema noted to B/L extremities. PT able to move all extremities spontaneously, sensation intact. IV access established 20G in left forearm. PT placed in hospital gown, non-slip socks applied. Safety and comfort maintained. Family at the bedside.

## 2023-01-03 NOTE — H&P ADULT - PROBLEM SELECTOR PLAN 1
Presented with worsening of chronic b/l LE edema as well as new LUE edema. Associated intermittent SOB and chest pressure/heaviness. Stopped taking her home Lasix about ~1 month prior to admission. Serum proBNP 250. CXR grossly clear. Suspect  worsening of chronic lymphedema/venous insufficiency in setting of diuretic non-adherence vs less likely CHF vs thyroid-related vs possible component of med side effect (amlodipine).    - hsTrop and EKG on admission not suggestive of ischemic cardiac etiology  - hold amlodipine for now given possible contribution to peripheral edema  - c/w leg elevation, ace bandage wrapping/compression stockings  - c/w Lasix 40mg IV daily for now; monitor volume status, kidney function, and BP to adjust as needed  - f/u TTE to assess for component of CHF (no significant CHF hx, though mild diastolic dysfunction was noted on TTE from 2015 but not subsequent ones)  - f/u Duplex of b/l LEs and LUE to r/o DVT  - f/u TSH/FT4 to check for thyroid-related etiology  - consider Vascular Surgery evaluation for possible operative management of chronic lymphema as pt reports minimal/no improvement with nonoperative management (may be evaluated outpatient)

## 2023-01-03 NOTE — ED ADULT NURSE NOTE - NSIMPLEMENTINTERV_GEN_ALL_ED
Implemented All Fall Risk Interventions:  [employer] to call system. Call bell, personal items and telephone within reach. Instruct patient to call for assistance. Room bathroom lighting operational. Non-slip footwear when patient is off stretcher. Physically safe environment: no spills, clutter or unnecessary equipment. Stretcher in lowest position, wheels locked, appropriate side rails in place. Provide visual cue, wrist band, yellow gown, etc. Monitor gait and stability. Monitor for mental status changes and reorient to person, place, and time. Review medications for side effects contributing to fall risk. Reinforce activity limits and safety measures with patient and family.

## 2023-01-03 NOTE — H&P ADULT - NSHPSOCIALHISTORY_GEN_ALL_CORE
Former smoker. Denied ever drinking EtOH or using illicit/recreational drugs. Wheelchair bound at baseline.

## 2023-01-03 NOTE — ED PROVIDER NOTE - ATTENDING APP SHARED VISIT CONTRIBUTION OF CARE
I, Diana Villafuerte, performed a history and physical exam of the patient and discussed their management with the resident and /or advanced care provider. I reviewed the resident and /or ACP's note and agree with the documented findings and plan of care. I was present and available for all procedures.   see MDM

## 2023-01-03 NOTE — H&P ADULT - NSHPPHYSICALEXAM_GEN_ALL_CORE
Vital Signs Last 24 Hrs  T(C): 36.7 (03 Jan 2023 21:00), Max: 36.8 (03 Jan 2023 18:27)  T(F): 98.1 (03 Jan 2023 21:00), Max: 98.3 (03 Jan 2023 18:27)  HR: 68 (03 Jan 2023 21:00) (68 - 74)  BP: 135/65 (03 Jan 2023 21:00) (135/65 - 151/71)  BP(mean): 83 (03 Jan 2023 21:00) (83 - 99)  RR: 16 (03 Jan 2023 21:00) (16 - 18)  SpO2: 98% (03 Jan 2023 21:00) (96% - 98%)    Parameters below as of 03 Jan 2023 21:00  Patient On (Oxygen Delivery Method): nasal cannula  O2 Flow (L/min): 3      CONSTITUTIONAL: NAD, well-groomed, obese woman laying in bed  EYES: PERRL; b/l proptosis; b/l ptosis, sclera clear  ENMT: Moist oral mucosa, no pharyngeal injection or exudates  NECK: Supple, no palpable masses  RESPIRATORY: Normal respiratory effort; auscultation of lungs limited by body habitus, but grossly lungs are clear to auscultation bilaterally; possible mild bibasilar rales, but no wheezes or significant crackles appreciated  CARDIOVASCULAR: Regular rate and rhythm; Normal S1 and S2; No murmurs, rubs, or gallops; 3+ pitting edema in b/l LUEs and 2+ in LUE; Peripheral pulses are 2+ bilaterally  ABDOMEN: Soft, Nontender, Nondistended; Bowel sounds present  MUSCULOSKELETAL:  No clubbing or cyanosis of digits; No joint swelling or tenderness to palpation  PSYCH: AAOx3 (oriented to person, place, and time); affect appropriate  NEUROLOGY: Eye findings as above, otherwise CN II-XII are intact and symmetric; no gross sensory deficits  SKIN: chronic venous stasis dermatitis findings in b/l legs; eschar on R thigh

## 2023-01-03 NOTE — H&P ADULT - PROBLEM SELECTOR PLAN 5
- holding home amlodipine for now given possible side effect of peripheral edema  - c/w home losartan  - monitor BP and consider adding non-[facility] antihypertensive med if needed - c/w home losartan and hydralazine  - holding home amlodipine for now given possible side effect of peripheral edema  - monitor BP and consider adding non-CCB antihypertensive med if needed

## 2023-01-03 NOTE — H&P ADULT - PROBLEM SELECTOR PLAN 9
- DVT ppx:  - Diet: DASH  - Dispo: pending further workup/improvement - DVT ppx: Lovenox  - Diet: DASH  - Dispo: pending further workup/improvement

## 2023-01-03 NOTE — H&P ADULT - NSHPLABSRESULTS_GEN_ALL_CORE
LABS:                        12.9   10.91 )-----------( 183      ( 03 Jan 2023 19:36 )             42.1     01-03    141  |  101  |  18  ----------------------------<  103<H>  4.0   |  29  |  0.87    Ca    9.9      03 Jan 2023 19:36  Mg     1.9     01-03    TPro  6.5  /  Alb  3.6  /  TBili  0.7  /  DBili  x   /  AST  12  /  ALT  12  /  AlkPhos  97  01-03              IMAGING/ADDITIONAL TESTS:    EKG (1/3/22) personally reviewed: NSR, HR 70, QTc 438, no significant ST elevations or depressions appreciated    CXR (1/3/22) personally reviewed: grossly clear lungs though possible vascular prominence in b/l lower lung fields, no focal consolidation noted    Prior TTEs reviewed.  < from: TTE with Doppler (w/Cont) (04.24.22 @ 13:36) >  ------------------------------------------------------------------------  CONCLUSIONS:  Technically difficult study.  1. Mitral annular calcification, otherwise normal mitral  valve. Minimal mitral regurgitation.  2. Endocardium not well visualized; grossly normal left  ventricular systolic function.  Estimated LVEF in the  60-65% range (by visual estimate).  Endocardial  visualization enhanced with intravenous injection of echo  contrast (Definity).  3. The right ventricle is not well visualized.  < end of copied text >    Of note, TTE in 2015 noted mild diastolic dysfunction (Stage I) though not noted on subsequent TTE reports.

## 2023-01-03 NOTE — H&P ADULT - PROBLEM SELECTOR PLAN 8
Pt does not recall all of her home medications. Prelim med rec done based off of discharge med rec from April 2022 and Surescripts.  - further med rec needed in AM Pt does not recall all of her home medications. Prelim med rec done based off of discharge med rec from April 2022 and recent prescriptions on Surescripts.  - further med rec needed in AM

## 2023-01-03 NOTE — ED PROVIDER NOTE - OBJECTIVE STATEMENT
72 year-old-female with history of COPD (not on home O2), hx of DVT not on AC, Graves disease, HLD, HTN, hypothyroidism, breast cancer in situ, parathyroidectomy, kidney cancer w/mets s/p L partial nephrectomy (not on chemo/RT), obesity and lymphedema now presenting to the ED via EMS for b/l lower extremity swelling x 2 weeks. Patient is wheelchair bound at baseline but lower extremity is swelling is getting worse and now has LUE swelling. Patient compliant with lasix 40mg once daily. Has home health aide daily 9.5 hours. Patient also reports SOB and chest heaviness for the last two weeks. Denied palpitations, syncope, abdominal pain

## 2023-01-03 NOTE — H&P ADULT - PROBLEM SELECTOR PROBLEM 9
----- Message from Franklyn Lawrence MD sent at 9/28/2020 10:05 AM CDT -----  a1c elevated, working with him re: diabetes, adrenal labs are showing overall normal results, mild elevation in aldosterone - will discuss this in person visit, rec schedule f/up visit with me.  
Last read by Chauncey Burroughs at 3:13 PM on 9/28/2020.    Patient read message on portal and scheduled follow up appt for January  
Left message to check portal messages.     
Prophylactic measure

## 2023-01-04 DIAGNOSIS — F41.9 ANXIETY DISORDER, UNSPECIFIED: ICD-10-CM

## 2023-01-04 DIAGNOSIS — M79.89 OTHER SPECIFIED SOFT TISSUE DISORDERS: ICD-10-CM

## 2023-01-04 DIAGNOSIS — Z29.9 ENCOUNTER FOR PROPHYLACTIC MEASURES, UNSPECIFIED: ICD-10-CM

## 2023-01-04 DIAGNOSIS — J44.9 CHRONIC OBSTRUCTIVE PULMONARY DISEASE, UNSPECIFIED: ICD-10-CM

## 2023-01-04 DIAGNOSIS — Z79.899 OTHER LONG TERM (CURRENT) DRUG THERAPY: ICD-10-CM

## 2023-01-04 DIAGNOSIS — G25.2 OTHER SPECIFIED FORMS OF TREMOR: ICD-10-CM

## 2023-01-04 DIAGNOSIS — I10 ESSENTIAL (PRIMARY) HYPERTENSION: ICD-10-CM

## 2023-01-04 DIAGNOSIS — C64.2 MALIGNANT NEOPLASM OF LEFT KIDNEY, EXCEPT RENAL PELVIS: ICD-10-CM

## 2023-01-04 DIAGNOSIS — R25.1 TREMOR, UNSPECIFIED: ICD-10-CM

## 2023-01-04 DIAGNOSIS — E03.9 HYPOTHYROIDISM, UNSPECIFIED: ICD-10-CM

## 2023-01-04 LAB
ALBUMIN SERPL ELPH-MCNC: 3.8 G/DL — SIGNIFICANT CHANGE UP (ref 3.3–5)
ALP SERPL-CCNC: 105 U/L — SIGNIFICANT CHANGE UP (ref 40–120)
ALT FLD-CCNC: 12 U/L — SIGNIFICANT CHANGE UP (ref 10–45)
ANION GAP SERPL CALC-SCNC: 12 MMOL/L — SIGNIFICANT CHANGE UP (ref 5–17)
AST SERPL-CCNC: 14 U/L — SIGNIFICANT CHANGE UP (ref 10–40)
BASOPHILS # BLD AUTO: 0.05 K/UL — SIGNIFICANT CHANGE UP (ref 0–0.2)
BASOPHILS NFR BLD AUTO: 0.5 % — SIGNIFICANT CHANGE UP (ref 0–2)
BILIRUB SERPL-MCNC: 0.8 MG/DL — SIGNIFICANT CHANGE UP (ref 0.2–1.2)
BUN SERPL-MCNC: 15 MG/DL — SIGNIFICANT CHANGE UP (ref 7–23)
CALCIUM SERPL-MCNC: 9.4 MG/DL — SIGNIFICANT CHANGE UP (ref 8.4–10.5)
CHLORIDE SERPL-SCNC: 100 MMOL/L — SIGNIFICANT CHANGE UP (ref 96–108)
CO2 SERPL-SCNC: 30 MMOL/L — SIGNIFICANT CHANGE UP (ref 22–31)
CREAT SERPL-MCNC: 0.84 MG/DL — SIGNIFICANT CHANGE UP (ref 0.5–1.3)
EGFR: 73 ML/MIN/1.73M2 — SIGNIFICANT CHANGE UP
EOSINOPHIL # BLD AUTO: 0.25 K/UL — SIGNIFICANT CHANGE UP (ref 0–0.5)
EOSINOPHIL NFR BLD AUTO: 2.7 % — SIGNIFICANT CHANGE UP (ref 0–6)
GLUCOSE SERPL-MCNC: 121 MG/DL — HIGH (ref 70–99)
HCT VFR BLD CALC: 43.4 % — SIGNIFICANT CHANGE UP (ref 34.5–45)
HGB BLD-MCNC: 13.1 G/DL — SIGNIFICANT CHANGE UP (ref 11.5–15.5)
IMM GRANULOCYTES NFR BLD AUTO: 0.3 % — SIGNIFICANT CHANGE UP (ref 0–0.9)
LYMPHOCYTES # BLD AUTO: 1.5 K/UL — SIGNIFICANT CHANGE UP (ref 1–3.3)
LYMPHOCYTES # BLD AUTO: 16.2 % — SIGNIFICANT CHANGE UP (ref 13–44)
MAGNESIUM SERPL-MCNC: 1.8 MG/DL — SIGNIFICANT CHANGE UP (ref 1.6–2.6)
MCHC RBC-ENTMCNC: 29.4 PG — SIGNIFICANT CHANGE UP (ref 27–34)
MCHC RBC-ENTMCNC: 30.2 GM/DL — LOW (ref 32–36)
MCV RBC AUTO: 97.5 FL — SIGNIFICANT CHANGE UP (ref 80–100)
MONOCYTES # BLD AUTO: 1.01 K/UL — HIGH (ref 0–0.9)
MONOCYTES NFR BLD AUTO: 10.9 % — SIGNIFICANT CHANGE UP (ref 2–14)
NEUTROPHILS # BLD AUTO: 6.44 K/UL — SIGNIFICANT CHANGE UP (ref 1.8–7.4)
NEUTROPHILS NFR BLD AUTO: 69.4 % — SIGNIFICANT CHANGE UP (ref 43–77)
NRBC # BLD: 0 /100 WBCS — SIGNIFICANT CHANGE UP (ref 0–0)
PHOSPHATE SERPL-MCNC: 4.4 MG/DL — SIGNIFICANT CHANGE UP (ref 2.5–4.5)
PLATELET # BLD AUTO: 174 K/UL — SIGNIFICANT CHANGE UP (ref 150–400)
POTASSIUM SERPL-MCNC: 3.4 MMOL/L — LOW (ref 3.5–5.3)
POTASSIUM SERPL-SCNC: 3.4 MMOL/L — LOW (ref 3.5–5.3)
PROT SERPL-MCNC: 6.9 G/DL — SIGNIFICANT CHANGE UP (ref 6–8.3)
RBC # BLD: 4.45 M/UL — SIGNIFICANT CHANGE UP (ref 3.8–5.2)
RBC # FLD: 15.3 % — HIGH (ref 10.3–14.5)
SODIUM SERPL-SCNC: 142 MMOL/L — SIGNIFICANT CHANGE UP (ref 135–145)
T4 FREE SERPL-MCNC: 1.3 NG/DL — SIGNIFICANT CHANGE UP (ref 0.9–1.8)
TSH SERPL-MCNC: 4.98 UIU/ML — HIGH (ref 0.27–4.2)
WBC # BLD: 9.28 K/UL — SIGNIFICANT CHANGE UP (ref 3.8–10.5)
WBC # FLD AUTO: 9.28 K/UL — SIGNIFICANT CHANGE UP (ref 3.8–10.5)

## 2023-01-04 PROCEDURE — 93321 DOPPLER ECHO F-UP/LMTD STD: CPT | Mod: 26

## 2023-01-04 PROCEDURE — 93308 TTE F-UP OR LMTD: CPT | Mod: 26

## 2023-01-04 RX ORDER — LEVOTHYROXINE SODIUM 125 MCG
125 TABLET ORAL DAILY
Refills: 0 | Status: DISCONTINUED | OUTPATIENT
Start: 2023-01-04 | End: 2023-01-12

## 2023-01-04 RX ORDER — IPRATROPIUM/ALBUTEROL SULFATE 18-103MCG
3 AEROSOL WITH ADAPTER (GRAM) INHALATION EVERY 6 HOURS
Refills: 0 | Status: DISCONTINUED | OUTPATIENT
Start: 2023-01-04 | End: 2023-01-12

## 2023-01-04 RX ORDER — HYDRALAZINE HCL 50 MG
75 TABLET ORAL
Refills: 0 | Status: DISCONTINUED | OUTPATIENT
Start: 2023-01-04 | End: 2023-01-12

## 2023-01-04 RX ORDER — DULOXETINE HYDROCHLORIDE 30 MG/1
60 CAPSULE, DELAYED RELEASE ORAL
Refills: 0 | Status: DISCONTINUED | OUTPATIENT
Start: 2023-01-04 | End: 2023-01-12

## 2023-01-04 RX ORDER — ASPIRIN/CALCIUM CARB/MAGNESIUM 324 MG
81 TABLET ORAL DAILY
Refills: 0 | Status: DISCONTINUED | OUTPATIENT
Start: 2023-01-04 | End: 2023-01-12

## 2023-01-04 RX ORDER — FUROSEMIDE 40 MG
1 TABLET ORAL
Qty: 0 | Refills: 0 | DISCHARGE

## 2023-01-04 RX ORDER — TIOTROPIUM BROMIDE 18 UG/1
2 CAPSULE ORAL; RESPIRATORY (INHALATION) DAILY
Refills: 0 | Status: DISCONTINUED | OUTPATIENT
Start: 2023-01-04 | End: 2023-01-12

## 2023-01-04 RX ORDER — ENOXAPARIN SODIUM 100 MG/ML
40 INJECTION SUBCUTANEOUS EVERY 24 HOURS
Refills: 0 | Status: DISCONTINUED | OUTPATIENT
Start: 2023-01-04 | End: 2023-01-12

## 2023-01-04 RX ORDER — OLANZAPINE 15 MG/1
20 TABLET, FILM COATED ORAL AT BEDTIME
Refills: 0 | Status: DISCONTINUED | OUTPATIENT
Start: 2023-01-04 | End: 2023-01-12

## 2023-01-04 RX ORDER — GABAPENTIN 400 MG/1
300 CAPSULE ORAL
Refills: 0 | Status: DISCONTINUED | OUTPATIENT
Start: 2023-01-04 | End: 2023-01-12

## 2023-01-04 RX ORDER — BACLOFEN 100 %
10 POWDER (GRAM) MISCELLANEOUS EVERY 8 HOURS
Refills: 0 | Status: DISCONTINUED | OUTPATIENT
Start: 2023-01-04 | End: 2023-01-12

## 2023-01-04 RX ORDER — MONTELUKAST 4 MG/1
10 TABLET, CHEWABLE ORAL DAILY
Refills: 0 | Status: DISCONTINUED | OUTPATIENT
Start: 2023-01-04 | End: 2023-01-12

## 2023-01-04 RX ORDER — FUROSEMIDE 40 MG
40 TABLET ORAL DAILY
Refills: 0 | Status: DISCONTINUED | OUTPATIENT
Start: 2023-01-04 | End: 2023-01-07

## 2023-01-04 RX ORDER — LOSARTAN POTASSIUM 100 MG/1
100 TABLET, FILM COATED ORAL DAILY
Refills: 0 | Status: DISCONTINUED | OUTPATIENT
Start: 2023-01-04 | End: 2023-01-12

## 2023-01-04 RX ORDER — LANOLIN ALCOHOL/MO/W.PET/CERES
3 CREAM (GRAM) TOPICAL AT BEDTIME
Refills: 0 | Status: DISCONTINUED | OUTPATIENT
Start: 2023-01-04 | End: 2023-01-12

## 2023-01-04 RX ORDER — ATORVASTATIN CALCIUM 80 MG/1
40 TABLET, FILM COATED ORAL AT BEDTIME
Refills: 0 | Status: DISCONTINUED | OUTPATIENT
Start: 2023-01-04 | End: 2023-01-12

## 2023-01-04 RX ADMIN — Medication 5 MILLIGRAM(S): at 05:24

## 2023-01-04 RX ADMIN — Medication 10 MILLIGRAM(S): at 22:59

## 2023-01-04 RX ADMIN — Medication 650 MILLIGRAM(S): at 03:44

## 2023-01-04 RX ADMIN — Medication 40 MILLIGRAM(S): at 05:23

## 2023-01-04 RX ADMIN — Medication 3 MILLIGRAM(S): at 22:59

## 2023-01-04 RX ADMIN — DULOXETINE HYDROCHLORIDE 60 MILLIGRAM(S): 30 CAPSULE, DELAYED RELEASE ORAL at 17:55

## 2023-01-04 RX ADMIN — ENOXAPARIN SODIUM 40 MILLIGRAM(S): 100 INJECTION SUBCUTANEOUS at 05:23

## 2023-01-04 RX ADMIN — MONTELUKAST 10 MILLIGRAM(S): 4 TABLET, CHEWABLE ORAL at 13:27

## 2023-01-04 RX ADMIN — Medication 81 MILLIGRAM(S): at 13:27

## 2023-01-04 RX ADMIN — GABAPENTIN 300 MILLIGRAM(S): 400 CAPSULE ORAL at 22:59

## 2023-01-04 RX ADMIN — OLANZAPINE 20 MILLIGRAM(S): 15 TABLET, FILM COATED ORAL at 22:58

## 2023-01-04 RX ADMIN — Medication 75 MILLIGRAM(S): at 17:55

## 2023-01-04 RX ADMIN — Medication 650 MILLIGRAM(S): at 07:19

## 2023-01-04 RX ADMIN — TIOTROPIUM BROMIDE 2 PUFF(S): 18 CAPSULE ORAL; RESPIRATORY (INHALATION) at 13:27

## 2023-01-04 RX ADMIN — LOSARTAN POTASSIUM 100 MILLIGRAM(S): 100 TABLET, FILM COATED ORAL at 05:24

## 2023-01-04 RX ADMIN — ATORVASTATIN CALCIUM 40 MILLIGRAM(S): 80 TABLET, FILM COATED ORAL at 22:59

## 2023-01-04 RX ADMIN — Medication 1000 MILLIGRAM(S): at 06:04

## 2023-01-04 RX ADMIN — GABAPENTIN 300 MILLIGRAM(S): 400 CAPSULE ORAL at 13:27

## 2023-01-04 RX ADMIN — Medication 10 MILLIGRAM(S): at 05:24

## 2023-01-04 RX ADMIN — Medication 75 MILLIGRAM(S): at 05:24

## 2023-01-04 RX ADMIN — Medication 10 MILLIGRAM(S): at 15:23

## 2023-01-04 RX ADMIN — Medication 125 MICROGRAM(S): at 07:25

## 2023-01-04 RX ADMIN — DULOXETINE HYDROCHLORIDE 60 MILLIGRAM(S): 30 CAPSULE, DELAYED RELEASE ORAL at 05:24

## 2023-01-04 RX ADMIN — Medication 3 MILLIGRAM(S): at 01:09

## 2023-01-04 NOTE — PATIENT PROFILE ADULT - PATIENT'S SEXUAL ORIENTATION
CC& HPI: This 58 year old  male presents for evaluation of infection right foot. He follows with Dr. Bonilla, but requested a work in appointment today due to drainage.  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 28 tablet 0   • Continuous Blood Gluc Sensor ([employer]STYLE YULIA 14 DAY SENSOR) Misc 1 each every 14 days. Collaborating physician: Dr. Muniz 6 each 3   • simvastatin (ZOCOR) 80 MG tablet Take 1 tablet by mouth every evening. Collaborating physician: Dr. Muniz 90 tablet 1   • insulin glargine (LANTUS SOLOSTAR) 100 UNIT/ML pen-injector Inject 65 Units into the skin nightly. Prime 2 units before each dose. 60 mL 0   • Insulin Lispro, 1 Unit Dial, (HUMALOG KWIKPEN) 100 UNIT/ML pen-injector Inject 3 Units into the skin 3 times daily (before meals). Prime 2 units before each dose. 9 mL 1   • Insulin Pen Needle (B-D U/F PEN NEEDLE 5/16\") 31G X 8 MM Misc Use with insulin 4 times daily 400 each 2   • tobramycin-dexamethasone (TOBRADEX) ophthalmic suspension POST-OP: 1 drop in surgery eye QID. Continue as directed.     • prednisoLONE acetate (PRED FORTE, OMNIPRED) 1 % ophthalmic suspension Apply 1 drop to eye.     • meloxicam (MOBIC) 15 MG tablet TAKE 1 TABLET BY MOUTH  ONCE DAILY WITH FOOD     • losartan (COZAAR) 25 MG tablet Take 2 tablets by mouth daily. 90 tablet 0   • RELION INSULIN SYRINGE 31G X 5/16\" 1 ML Misc USE TWICE DAILY WITH INSULIN 100 each 8   • Continuous Blood Gluc  (FREESTYLE YULIA 14 DAY READER) Device USE ONCE DAILY 1 each 0   • triamcinolone acetonide (KENALOG-40) 40 MG/ML injectable suspension 40 mg by Intravitreal route. - Intravitreal     • timolol (TIMOPTIC) 0.5 % ophthalmic solution 1 Drop.     • dorzolamide-timolol (COSOPT) 22.3-6.8 MG/ML ophthalmic solution      • dorzolamide (TRUSOPT) 2 % ophthalmic solution 1 drop.       No current facility-administered medications for this visit.       ALLERGIES: no known allergies.   Social History     Tobacco Use   Smoking Status Former Smoker   Smokeless Tobacco Never Used          ROS:   General: Pt denies F/C, N/V, fatigue, sleepiness, disorientation  Integument: +  prior ulcer  Musculoskeletal: +  previous amputation   HEENT: no visual impairment, can see wound    PHYSICAL EXAM:  General:  No apparent distress; appears non-toxic  Vascular:   Pedal pulses     DP 2/3     PT 2/3      CFT = 2 seconds to toes, bilateral   Feet are cooler distally with thin skin, bilateral   VANESSA exam: noncompressible with strong biphasic waveforms  Edema: minimal  Neurologic:   Vibratory Perception Threshold testing  Not detected bilateral hallux   Musculoskeletal  Right lower extremity and left lower extremity:  Osseous alignment is clinically normal except hallux interphalangeus / hallux valgus right; amputation 2nd toeri  Joint ROM is limited, without joint effusion   Limb stability is acceptable without acute instability.  Muscle strength is 5/5 strength of foot inversion, eversion, dorsiflexion and plantarflexion with normal tone   Digits show no clubbing or cyanosis  Integument:   Nails are thick, dry.   Skin is   Wound  LOCATION: dorsum right hallux at proximal nail fold with lysis of nail karly     IMAGING: Independently  reviewed the images no gross involvement; some interval change from previous films, equivocal osteomyelitis        ASSESSMENT   Neuropathic  ulceration, right great toe  Ulcer is secondary to peripheral neuropathy, toe deformity; nail deformity   Healing may be complicated by DMT2    PLAN   Discussed findings, pathology and necessary testing. Developed a comprehensive wound management plan:  1.  Foot x-rays to assess for osteomyelitis, equivocal, will monitor closely. Start Augmentin 875 mg bid.  2.  Performed VANESSA exam due to ulceration and absence of pedal pulses to see if patient is a candidate for referral for possible vascular intervention. Arterial status appears satisfactory for healing.  3.   Failure to reduce pressure on the wound may result in non-healing.  Offload area with open shoes. Limit weightbearing, off work.  4.   Dressing changes ordered to develop and maintain a clean, moist, granular wound bed. See patient instructions/orders.  5.   Discussed role of protein and nutrients for wound healing.  6.   Return in 7-10 days for recheck and will discuss findings, any test results and expected course of treatment, sooner if any concerns or changes in condition.  Failure to follow up can lead to serious consequences, including amputation.    We will attempt to treat this as an out-patient. However, that is not always possible. If a secondary infection develops and advances, in-patient treatment may be necessary. Signs of infection can be: enlarging wound, increasing drainage, odor, redness, swelling, heat, increasing pain. Signs of infection spreading into the body/blood stream can be: fever or chills, flu-like feeling, fatigue or sleepiness, weakness - especially in the leg muscles, confusion. Any of these changes require immediate evaluation. If we are unavailable, please proceed to WIC or ER for further evaluation.  Waiting can lead to further spread of infection, which can lead to loss of foot, leg or  life.    Procedures performed, in addition to the above separate evaluation and management services:  I & D of abscess with total nail avulsion.  We discussed the purpose of incision and drainage procedure, which is to remove any infected and nonviable tissue.  We discussed that empiric oral antibiotic therapy will be started, but may be changed pending results of culture and sensitivity tests done today.    Procedure:  The keratotic and nonviable skin was debrided and an incision was made over the abscessed area.  A hemostat was used to bluntly dissect the subcutaneous tissue to expose and open the abscess and the overlying nail plate is avulsed..  A tissue nipper was used to remove the nonviable subcutaneous tissue.  The area was lavaged with sterile saline.  Aerobic and anaerobic cultures were taken.   No remaining non-viable tissue was visualized and there was negative probe to bone.    Electronically signed by: Jelly Starr DPM  2/17/2020     Heterosexual

## 2023-01-04 NOTE — ED ADULT NURSE REASSESSMENT NOTE - NS ED NURSE REASSESS COMMENT FT1
Rn contacted NP <Clyde for admitting team , lo let her know that pt requesting Imodium for Diarrhea, awaiting for further orders

## 2023-01-04 NOTE — PATIENT PROFILE ADULT - FUNCTIONAL ASSESSMENT - BASIC MOBILITY 6.
2-calculated by average/Not able to assess (calculate score using [facility] averaging method)

## 2023-01-04 NOTE — ED ADULT NURSE REASSESSMENT NOTE - NS ED NURSE REASSESS COMMENT FT1
Report received from BETY PERDOMO  Pt AAOx4, NAD, resp nonlabored, skin warm/dry, resting comfortably in bed , pt c/o b/l legs and back pain (8/10) and SOB . Pt denies headache, dizziness, chest pain, palpitations, abd pain, n/v/d, urinary symptoms, fevers, chills, weakness at this time. Pt awaiting for bed  . Safety maintained with call bell within reach, pt on primafit, no distress noted

## 2023-01-04 NOTE — PATIENT PROFILE ADULT - FALL HARM RISK - HARM RISK INTERVENTIONS

## 2023-01-04 NOTE — PROGRESS NOTE ADULT - ASSESSMENT
_________________________________________________________________________________________  ========>>  M E D I C A L   A T T E N D I N G    F O L L O W  U P  N O T E  <<=========  -----------------------------------------------------------------------------------------------------    - Patient seen and examined by me approximately sixty minutes ago.  - In summary,  SHEKHAR VASQUEZ is a 72y year old woman admitted with   - Patient today overall doing ok, comfortable, eating OK.     ==================>> REVIEW OF SYSTEM <<=================    GEN: no fever, no chills, no pain  RESP: no SOB, no cough, no sputum  CVS: no chest pain, no palpitations, no edema  GI: no abdominal pain, no nausea, no constipation, no diarrhea  : no dysuria, no frequency, no hematuria  Neuro: no headache, no dizziness  Derm : no itching, no rash    ==================>> PHYSICAL EXAM <<=================    GEN: A&O X 3 , NAD , comfortable, pleasant, calm   HEENT: NCAT, PERRL, MMM, hearing intact  Neck: supple , no JVD appreciated  CVS: S1S2 , regular , No M/R/G appreciated  PULM: CTA B/L,  no W/R/R appreciated  ABD.: soft. non tender, non distended,  bowel sounds present  Extrem: intact pulses , no edema   PSYCH : normal mood,  not anxious                            ( Note Written / Date of service :  01-04-23 )    ==================>> MEDICATIONS <<====================    MEDICATIONS  (STANDING):  aspirin enteric coated 81 milliGRAM(s) Oral daily  atorvastatin 40 milliGRAM(s) Oral at bedtime  baclofen 10 milliGRAM(s) Oral every 8 hours  busPIRone 20 milliGRAM(s) Oral <User Schedule>  DULoxetine 60 milliGRAM(s) Oral two times a day  enoxaparin Injectable 40 milliGRAM(s) SubCutaneous every 24 hours  furosemide   Injectable 40 milliGRAM(s) IV Push daily  gabapentin 300 milliGRAM(s) Oral <User Schedule>  hydrALAZINE 75 milliGRAM(s) Oral two times a day  levothyroxine 125 MICROGram(s) Oral daily  losartan 100 milliGRAM(s) Oral daily  melatonin 3 milliGRAM(s) Oral at bedtime  montelukast 10 milliGRAM(s) Oral daily  OLANZapine 20 milliGRAM(s) Oral at bedtime  predniSONE   Tablet 5 milliGRAM(s) Oral daily  tiotropium 2.5 MICROgram(s) Inhaler 2 Puff(s) Inhalation daily    MEDICATIONS  (PRN):  acetaminophen     Tablet .. 650 milliGRAM(s) Oral every 6 hours PRN Temp greater or equal to 38C (100.4F), Mild Pain (1 - 3)  albuterol/ipratropium for Nebulization 3 milliLiter(s) Nebulizer every 6 hours PRN Shortness of Breath and/or Wheezing    ___________  Active diet:  Diet, Regular:   DASH/TLC Sodium & Cholesterol Restricted (DASH)  ___________________    ==================>> VITAL SIGNS <<==================    T(C): 36.9 (01-04-23 @ 14:24), Max: 37.1 (01-04-23 @ 07:16)  HR: 66 (01-04-23 @ 14:24) (66 - 76)  BP: 142/72 (01-04-23 @ 14:24) (123/73 - 159/81)  BP(mean): 91 (01-04-23 @ 05:00)  RR: 20 (01-04-23 @ 14:24) (16 - 20)  SpO2: 96% (01-04-23 @ 14:24) (96% - 98%)      ==================>> LAB AND IMAGING <<==================                        13.1   9.28  )-----------( 174      ( 04 Jan 2023 05:22 )             43.4        01-04    142  |  100  |  15  ----------------------------<  121<H>  3.4<L>   |  30  |  0.84    Ca    9.4      04 Jan 2023 05:22  Phos  4.4     01-04  Mg     1.8     01-04    TPro  6.9  /  Alb  3.8  /  TBili  0.8  /  DBili  x   /  AST  14  /  ALT  12  /  AlkPhos  105  01-04    TSH:      4.98   (01-04-23)           pending Echo  pending LE duplex.    ___________________________________________________________________________________  ===============>>  A S S E S S M E N T   A N D   P L A N <<===============  ------------------------------------------------------------------------------------------    · Assessment	  72F w/ hx of kidney cancer c/b ?mets (s/p L partial nephrectomy, no chemo/RT), breast cancer in situ, prior DVT (not on AC), COPD (not on home O2), asthma, former smoker, anxiety, IBS with chronic diarrhea, Graves disease, Hypothyroidism, parathyroidectomy, HTN, HLD, NOEMY, obesity, venous insufficiency, presenting with worsening of chronic b/l LE swelling x2 weeks as well as development of LUE swelling x1 month. Associated intermittent SOB and chest pressure/heaviness. Suspect worsening of chronic lymphedema/venous insufficiency in setting of diuretic non-adherence vs less likely CHF vs thyroid-related    Problem/Plan - 1:  ·  Problem: Swelling of extremity.   ·  Plan: Presented with worsening of chronic b/l LE edema as well as new LUE edema. Associated intermittent SOB and chest pressure/heaviness. Stopped taking her home Lasix about ~1 month prior to admission. Serum proBNP 250. CXR grossly clear. Suspect  worsening of chronic lymphedema/venous insufficiency in setting of diuretic non-adherence vs less likely acute exacerbation of chronic diastolic CHF vs possible component of med side effect (amlodipine).    - hsTrop and EKG on admission not suggestive of ischemic cardiac etiology  - hold amlodipine for now given possible contribution to peripheral edema  - c/w leg elevation, ace bandage wrapping/compression stockings  - c/w Lasix 40mg IV daily for now; monitor volume status, kidney function, and BP to adjust as needed  - f/u TTE to assess for component of CHF (no significant CHF hx, though mild diastolic dysfunction was noted on TTE from 2015 but not subsequent ones)  - f/u Duplex of b/l LEs and LUE to r/o DVT  - TSH ok   - patient generally with sedentary life style and likely contributing..     Problem/Plan - 2:  ·  Problem: Hypothyroidism.   ·  Plan: Hx of hypothyroidism, Graves disease, and s/p parathyroidectomy.  - c/w home levothyroxine  - TSH ok     Problem/Plan - 3:  ·  Problem: Resting tremor.   ·  Plan: Pt reported development of new resting tremors in b/l UEs over the past month. Unclear etiology, possibly 2/2 extrapyramidal symptoms from olanzapine vs development of Parkinsons vs ?side effect of gabapentin  - outpatient Neurology follow-up.    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   ·  Plan: Hx of kidney cancer c/b ?mets (s/p L partial nephrectomy, no chemo/RT). Of note, pt denied presence of mets and only noted hx of other cancer diagnoses (breast cancer and skin cancer).  - on admission, SCr 0.87 (baseline SCr ~0.6-0.8 in April 2022)  - monitor SCr and electrolytes; avoid nephrotoxins as able given solitary kidney.    Problem/Plan - 5:  ·  Problem: HTN (hypertension).   ·  Plan: - c/w home losartan and hydralazine  - holding home amlodipine for now given possible side effect of peripheral edema  - monitor BP and consider adding non-CCB antihypertensive med if needed.    Problem/Plan - 6:  ·  Problem: COPD with asthma.   ·  Plan: Hx of COPD/asthma (not on home O2). Pt reported some intermittent SOB but no wheezes appreciated on exam. Denied cough.  - Does not appear to be in COPD/asthma exacerbation at this time  - c/w home montelukast, prednisone, and Spiriva inhaler  - c/w duonebs q6h PRN  - wean off supplemental O2 as tolerated, target O2 sat of 88-92% in setting of COPD hx.    Problem/Plan - 7:  ·  Problem: Anxiety and depression.   ·  Plan: - c/w home buspirone, olanzapine, and duloxetine.    Problem/Plan - 8:  ·  Problem: Prophylactic measure.   ·  Plan: - DVT ppx: Lovenox  - Diet: DASH  - Dispo: pending further workup/improvement.    --------------------------------------------  Case discussed with patient  Education given on findings and plan of care  ___________________________  HRenetta Welch D.O.  Pager: [phone]

## 2023-01-05 LAB
ANION GAP SERPL CALC-SCNC: 12 MMOL/L — SIGNIFICANT CHANGE UP (ref 5–17)
BUN SERPL-MCNC: 13 MG/DL — SIGNIFICANT CHANGE UP (ref 7–23)
CALCIUM SERPL-MCNC: 9.6 MG/DL — SIGNIFICANT CHANGE UP (ref 8.4–10.5)
CHLORIDE SERPL-SCNC: 98 MMOL/L — SIGNIFICANT CHANGE UP (ref 96–108)
CO2 SERPL-SCNC: 30 MMOL/L — SIGNIFICANT CHANGE UP (ref 22–31)
CREAT SERPL-MCNC: 0.76 MG/DL — SIGNIFICANT CHANGE UP (ref 0.5–1.3)
EGFR: 82 ML/MIN/1.73M2 — SIGNIFICANT CHANGE UP
GI PCR PANEL: SIGNIFICANT CHANGE UP
GLUCOSE BLDC GLUCOMTR-MCNC: 114 MG/DL — HIGH (ref 70–99)
GLUCOSE SERPL-MCNC: 137 MG/DL — HIGH (ref 70–99)
POTASSIUM SERPL-MCNC: 3.6 MMOL/L — SIGNIFICANT CHANGE UP (ref 3.5–5.3)
POTASSIUM SERPL-SCNC: 3.6 MMOL/L — SIGNIFICANT CHANGE UP (ref 3.5–5.3)
SODIUM SERPL-SCNC: 140 MMOL/L — SIGNIFICANT CHANGE UP (ref 135–145)

## 2023-01-05 PROCEDURE — 99222 1ST HOSP IP/OBS MODERATE 55: CPT

## 2023-01-05 PROCEDURE — 93971 EXTREMITY STUDY: CPT | Mod: 26,LT

## 2023-01-05 PROCEDURE — 93970 EXTREMITY STUDY: CPT | Mod: 26

## 2023-01-05 RX ORDER — CLOTRIMAZOLE AND BETAMETHASONE DIPROPIONATE 10; .5 MG/G; MG/G
1 CREAM TOPICAL
Refills: 0 | Status: DISCONTINUED | OUTPATIENT
Start: 2023-01-05 | End: 2023-01-06

## 2023-01-05 RX ORDER — LOPERAMIDE HCL 2 MG
2 TABLET ORAL
Refills: 0 | Status: DISCONTINUED | OUTPATIENT
Start: 2023-01-05 | End: 2023-01-12

## 2023-01-05 RX ORDER — SOD,AMMONIUM,POTASSIUM LACTATE
1 CREAM (GRAM) TOPICAL
Refills: 0 | Status: COMPLETED | OUTPATIENT
Start: 2023-01-05 | End: 2023-01-06

## 2023-01-05 RX ADMIN — ENOXAPARIN SODIUM 40 MILLIGRAM(S): 100 INJECTION SUBCUTANEOUS at 06:06

## 2023-01-05 RX ADMIN — DULOXETINE HYDROCHLORIDE 60 MILLIGRAM(S): 30 CAPSULE, DELAYED RELEASE ORAL at 17:24

## 2023-01-05 RX ADMIN — GABAPENTIN 300 MILLIGRAM(S): 400 CAPSULE ORAL at 21:05

## 2023-01-05 RX ADMIN — Medication 1 APPLICATORFUL: at 23:10

## 2023-01-05 RX ADMIN — Medication 10 MILLIGRAM(S): at 13:37

## 2023-01-05 RX ADMIN — DULOXETINE HYDROCHLORIDE 60 MILLIGRAM(S): 30 CAPSULE, DELAYED RELEASE ORAL at 06:07

## 2023-01-05 RX ADMIN — CLOTRIMAZOLE AND BETAMETHASONE DIPROPIONATE 1 APPLICATION(S): 10; .5 CREAM TOPICAL at 23:09

## 2023-01-05 RX ADMIN — Medication 5 MILLIGRAM(S): at 06:06

## 2023-01-05 RX ADMIN — Medication 75 MILLIGRAM(S): at 06:06

## 2023-01-05 RX ADMIN — Medication 125 MICROGRAM(S): at 06:07

## 2023-01-05 RX ADMIN — Medication 3 MILLIGRAM(S): at 20:52

## 2023-01-05 RX ADMIN — Medication 81 MILLIGRAM(S): at 11:19

## 2023-01-05 RX ADMIN — Medication 650 MILLIGRAM(S): at 13:39

## 2023-01-05 RX ADMIN — Medication 20 MILLIGRAM(S): at 11:19

## 2023-01-05 RX ADMIN — Medication 10 MILLIGRAM(S): at 20:53

## 2023-01-05 RX ADMIN — MONTELUKAST 10 MILLIGRAM(S): 4 TABLET, CHEWABLE ORAL at 11:19

## 2023-01-05 RX ADMIN — Medication 650 MILLIGRAM(S): at 17:12

## 2023-01-05 RX ADMIN — LOSARTAN POTASSIUM 100 MILLIGRAM(S): 100 TABLET, FILM COATED ORAL at 06:07

## 2023-01-05 RX ADMIN — Medication 10 MILLIGRAM(S): at 06:07

## 2023-01-05 RX ADMIN — GABAPENTIN 300 MILLIGRAM(S): 400 CAPSULE ORAL at 11:31

## 2023-01-05 RX ADMIN — TIOTROPIUM BROMIDE 2 PUFF(S): 18 CAPSULE ORAL; RESPIRATORY (INHALATION) at 11:35

## 2023-01-05 RX ADMIN — ATORVASTATIN CALCIUM 40 MILLIGRAM(S): 80 TABLET, FILM COATED ORAL at 20:52

## 2023-01-05 RX ADMIN — Medication 40 MILLIGRAM(S): at 06:07

## 2023-01-05 RX ADMIN — OLANZAPINE 20 MILLIGRAM(S): 15 TABLET, FILM COATED ORAL at 23:09

## 2023-01-05 RX ADMIN — Medication 75 MILLIGRAM(S): at 17:25

## 2023-01-05 NOTE — CONSULT NOTE ADULT - ASSESSMENT
Impression:    Sacral/bilateral Buttocks deep tissue injury present on admission  Intertrigo - posterior thorax, lower abdominal skin fold, bilateral groins, perineum  vulvovaginitis  RIght leg wound  BLE edema with venous stasis dermatitis  Incontinence of bowel and bladder  Incontinence Dermatitis  Vulvovaginitis    Recommend:  1.) topical therapy: sacral/buttock, skin folds in lower abd and back, bilateral groins, perineum - cleanse with incontinence cleanser, pat dry, apply clotrimazole/betamethasone cream BID   2.) Incontinence Management - incontinence cleanser, pads, pericare BID, continue external female urinary catheter, Charo for incontinence episodes when not applying antifungal  3.) Maintain on an alternating air with low air loss surface  4.) Turn and reposition Q 2 hours  5.) Nutrition optimization  6.) Offload heels/feet with pillows; ensure that the soles of the feet are not resting on the foot board of the bed.  7,) Diuretics per medicine/Cards  8.) Multiple layer compression wraps - PT wound care  9.) Clotrimazole 2% per vagina HS x 7 days  10.) Emollient therapy: LLE - cleanse with soap and water, pat dry, apply ammonium lactate 12% with compression wrap changes    Care as per medicine. Will follow.  Upon [facility] [address] [place] [address] [phone]  Thank you for this consult  Seen and discussed with clinical nurse  Josey Best, DEMARCO-C, CWOCN [id number] Impression:    Sacral/bilateral Buttocks deep tissue injury present on admission  Intertrigo - posterior thorax, lower abdominal skin fold, bilateral groins, perineum  vulvovaginitis  RIght leg wound  BLE edema with venous stasis dermatitis  Incontinence of bowel and bladder  Incontinence Dermatitis  Vulvovaginitis    Recommend:  1.) topical therapy: sacral/buttock, skin folds in lower abd and back, bilateral groins, perineum - cleanse with incontinence cleanser, pat dry, apply clotrimazole/betamethasone cream BID   2.) Incontinence Management - incontinence cleanser, pads, pericare BID, continue external female urinary catheter, Charo for incontinence episodes when not applying antifungal  3.) Maintain on an alternating air with low air loss surface  4.) Turn and reposition Q 2 hours  5.) Nutrition optimization  6.) Offload heels/feet with pillows; ensure that the soles of the feet are not resting on the foot board of the bed.  7,) Diuretics per medicine/Cards  8.) Multiple layer compression wraps - PT wound care  9.) Clotrimazole 1% per vagina HS x 7 days  10.) Emollient therapy: LLE - cleanse with soap and water, pat dry, apply ammonium lactate 12% with compression wrap changes    Care as per medicine. Will follow.  Upon discharge f[facility]u [facility] at [facility] [address] [place] [address] [phone]  Thank you for this consult  Seen and discussed with clinical nurse  Josey Best, DEMARCO-C, CWOCN [id number]

## 2023-01-05 NOTE — PROGRESS NOTE ADULT - ASSESSMENT
_________________________________________________________________________________________  ========>>  M E D I C A L   A T T E N D I N G    F O L L O W  U P  N O T E  <<=========  -----------------------------------------------------------------------------------------------------    - Patient seen and examined by me earlier today.   - In summary,  SHEKHAR VASQUEZ is a 72y year old woman admitted with edema, SOB  - Patient today overall doing ok, comfortable, eating OK. >> patient educated on proper diet, weight loss, avoidance of sedentary life style... all questions answered.     ==================>> REVIEW OF SYSTEM <<=================    GEN: no fever, no chills, no pain  RESP: occasional SOB, no cough, no sputum  CVS: no chest pain, no palpitations,   GI: no abdominal pain, no nausea, no constipation, no diarrhea  : no dysuria, no frequency, no hematuria  Neuro: no headache, no dizziness  Derm : no itching, no rash    ==================>> PHYSICAL EXAM <<=================    GEN: A&O X 3 , NAD , comfortable, pleasant, calm  in bed   HEENT: NCAT, PERRL, MMM, hearing intact  Neck: supple , no JVD appreciated  CVS: S1S2 , regular , No M/R/G appreciated  PULM: CTA B/L,  limited exam due to body habitus.   ABD.: soft. non tender, non distended,  obese   Extrem: intact pulses , generalized edema   PSYCH : normal mood,  not anxious                                 ( Note written / Date of service 01-05-23 )    ==================>> MEDICATIONS <<====================    ammonium lactate 12% Lotion 1 Application(s) Topical <User Schedule>  aspirin enteric coated 81 milliGRAM(s) Oral daily  atorvastatin 40 milliGRAM(s) Oral at bedtime  baclofen 10 milliGRAM(s) Oral every 8 hours  busPIRone 20 milliGRAM(s) Oral <User Schedule>  clotrimazole 1% Vaginal Cream 1 Applicatorful Vaginal at bedtime  clotrimazole/betamethasone Cream 1 Application(s) Topical two times a day  DULoxetine 60 milliGRAM(s) Oral two times a day  enoxaparin Injectable 40 milliGRAM(s) SubCutaneous every 24 hours  furosemide   Injectable 40 milliGRAM(s) IV Push daily  gabapentin 300 milliGRAM(s) Oral <User Schedule>  hydrALAZINE 75 milliGRAM(s) Oral two times a day  levothyroxine 125 MICROGram(s) Oral daily  losartan 100 milliGRAM(s) Oral daily  melatonin 3 milliGRAM(s) Oral at bedtime  montelukast 10 milliGRAM(s) Oral daily  OLANZapine 20 milliGRAM(s) Oral at bedtime  predniSONE   Tablet 5 milliGRAM(s) Oral daily  tiotropium 2.5 MICROgram(s) Inhaler 2 Puff(s) Inhalation daily    MEDICATIONS  (PRN):  acetaminophen     Tablet .. 650 milliGRAM(s) Oral every 6 hours PRN Temp greater or equal to 38C (100.4F), Mild Pain (1 - 3)  albuterol/ipratropium for Nebulization 3 milliLiter(s) Nebulizer every 6 hours PRN Shortness of Breath and/or Wheezing  loperamide 2 milliGRAM(s) Oral two times a day PRN Diarrhea    ___________  Active diet:  Diet, Regular:   DASH/TLC Sodium & Cholesterol Restricted (DASH)  ___________________    ==================>> VITAL SIGNS <<==================    Height (cm): 172.7  Weight (kg): 99.8  BMI (kg/m2): 33.5  Vital Signs Last 24 HrsT(C): 37.4 (01-05-23 @ 20:13)  T(F): 99.3 (01-05-23 @ 20:13), Max: 99.3 (01-05-23 @ 20:13)  HR: 67 (01-05-23 @ 20:13) (67 - 82)  BP: 123/70 (01-05-23 @ 20:13)  RR: 18 (01-05-23 @ 20:13) (16 - 18)  SpO2: 92% (01-05-23 @ 20:13) (92% - 94%)      CAPILLARY BLOOD GLUCOSE  POCT Blood Glucose.: 114 mg/dL (05 Jan 2023 08:22)     ==================>> LAB AND IMAGING <<==================                        13.1   9.28  )-----------( 174      ( 04 Jan 2023 05:22 )             43.4        01-05    140  |  98  |  13  ----------------------------<  137<H>  3.6   |  30  |  0.76    Ca    9.6      05 Jan 2023 11:11  Phos  4.4     01-04  Mg     1.8     01-04    TPro  6.9  /  Alb  3.8  /  TBili  0.8  /  DBili  x   /  AST  14  /  ALT  12  /  AlkPhos  105  01-04    WBC count:   9.28 <<== ,  10.91 <<==   Hemoglobin:   13.1 <<==,  12.9 <<==  platelets:  174 <==, 183 <==    Creatinine:  0.76  <<==, 0.84  <<==, 0.87  <<==  Sodium:   140  <==, 142  <==, 141  <==    < from: Limited Transthoracic Echo (01.04.23 @ 13:54) >  Conclusions:  This was a very technically difficult study with poor image  quality. Patient was not positioned optimally and not able  to tolerate a complete echo study.  1. Endocardium not well visualized; unable to evaluate left ventricular systolic function.  2. Right atrium not well visualized.  3. The right ventricle is not well visualized.  4. No pericardial effusion seen.  *** No previous Echo exam.    < end of copied text >  < from: [facility] Duplex Upper Ext Vein Scan, Left (01.05.23 @ 16:55) >  IMPRESSION:  No evidence of left upper extremity deep venous thrombosis.  < end of copied text >    < from: [facility] Duplex Lower Ext Vein Scan, Bilat (01.05.23 @ 16:54) >  IMPRESSION: Limited study.  No evidence of deep venous thrombosis in either lower extremity in the   visualized venous segments.  The calf veins were not visualized.  < end of copied text >    ___________________________________________________________________________________  ===============>>  A S S E S S M E N T   A N D   P L A N <<===============  ------------------------------------------------------------------------------------------    · Assessment	  72F w/ hx of kidney cancer c/b ?mets (s/p L partial nephrectomy, no chemo/RT), breast cancer in situ, prior DVT (not on AC), COPD (not on home O2), asthma, former smoker, anxiety, IBS with chronic diarrhea, Graves disease, Hypothyroidism, parathyroidectomy, HTN, HLD, NOEMY, obesity, venous insufficiency, presenting with worsening of chronic b/l LE swelling x2 weeks as well as development of LUE swelling x1 month. Associated intermittent SOB and chest pressure/heaviness. Suspect worsening of chronic lymphedema/venous insufficiency in setting of diuretic non-adherence vs less likely CHF vs thyroid-related    Problem/Plan - 1:  ·  Problem: Swelling of extremity.   ·  Plan: Presented with worsening of chronic b/l LE edema as well as new LUE edema. Associated intermittent SOB and chest pressure/heaviness. Stopped taking her home Lasix about ~1 month prior to admission. Serum proBNP 250. CXR grossly clear. Suspect  worsening of chronic lymphedema/venous insufficiency in setting of diuretic non-adherence vs less likely acute exacerbation of chronic diastolic CHF vs possible component of med side effect (amlodipine).    - hsTrop and EKG on admission not suggestive of ischemic cardiac etiology  - hold amlodipine for now given possible contribution to peripheral edema  - c/w leg elevation, ace bandage wrapping/compression stockings  - c/w Lasix 40mg IV daily for now; monitor volume status, kidney function, and BP to adjust as needed  - imaging as above   - TSH ok   - no U/A done but serum protein and albumin levels are ok   - patient generally with sedentary life style and likely contributing..         patient educated on diet, activity, weight loss... all questions answered.     Problem/Plan - 2:  ·  Problem: Hypothyroidism.   ·  Plan: Hx of hypothyroidism, Graves disease, and s/p parathyroidectomy.  - c/w home levothyroxine  - TSH ok     Problem/Plan - 3:  ·  Problem: Resting tremor.   ·  Plan: Pt reported development of new resting tremors in b/l UEs over the past month. Unclear etiology, possibly 2/2 extrapyramidal symptoms from olanzapine vs development of Parkinsons vs ?side effect of gabapentin  - outpatient Neurology follow-up.    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   ·  Plan: Hx of kidney cancer c/b ?mets (s/p L partial nephrectomy, no chemo/RT). Of note, pt denied presence of mets and only noted hx of other cancer diagnoses (breast cancer and skin cancer).  - on admission, SCr 0.87 (baseline SCr ~0.6-0.8 in April 2022)  - monitor SCr and electrolytes; avoid nephrotoxins as able given solitary kidney.    Problem/Plan - 5:  ·  Problem: HTN (hypertension).   ·  Plan: - c/w home losartan and hydralazine  - holding home amlodipine for now given possible side effect of peripheral edema  - monitor BP and consider adding non-CCB antihypertensive med if needed.    Problem/Plan - 6:  ·  Problem: COPD with asthma.   ·  Plan: Hx of COPD/asthma (not on home O2). Pt reported some intermittent SOB but no wheezes appreciated on exam. Denied cough.  - Does not appear to be in COPD/asthma exacerbation at this time  - c/w home montelukast, prednisone, and Spiriva inhaler  - c/w duonebs q6h PRN  - wean off supplemental O2 as tolerated, target O2 sat of 88-92% in setting of COPD hx.    Problem/Plan - 7:  ·  Problem: Anxiety and depression.   ·  Plan: - c/w home buspirone, olanzapine, and duloxetine.    Problem/Plan - 8:  ·  Problem: Prophylactic measure.   ·  Plan: - DVT ppx: Lovenox  - Diet: DASH    - Dispo: pending soon     --------------------------------------------  Case discussed with patient  Education given on findings and plan of care  ___________________________  H. JANICE Welch.  Pager: [phone]

## 2023-01-05 NOTE — CONSULT NOTE ADULT - SUBJECTIVE AND OBJECTIVE BOX
CHIEF COMPLAINT: edema    HISTORY OF PRESENT ILLNESS:  72F w/ hx of kidney cancer c/b ?mets (s/p L partial nephrectomy, no chemo/RT), breast cancer in situ, prior DVT (not on AC), COPD (not on home O2), asthma, former smoker, anxiety, IBS with chronic diarrhea, Graves disease, Hypothyroidism, parathyroidectomy, HTN, HLD, NOEMY, obesity, venous insufficiency, prior COVID-19 infection (2021), presenting with worsening of chronic b/l LE swelling x2 weeks. Also reported intermittent SOB and chest heaviness/pressure as well over the past two weeks. Stated she has also developed LUE swelling and b/l UE resting tremors over the past month. Swelling of extremities is associated with pain/tenderness. The b/l leg swelling has been present for years, but recently has been getting worse to the point where she is blisters and water coming out of the skin. Noted that everything that she has tried has not worked to improve the swelling in her legs (including ace bandage, leg elevation, leg compression machine). Pt reported that she stopped her Lasix 40mg PO daily medication about a month ago because all it did was make her uncomfortable with frequent urination without reducing the swelling. Has chronic diarrhea from IBS. Otherwise, denied f/c/n/v, CP, abdominal pain, dysuria, hematuria, hematochezia, melena, constipation. Pt is wheelchair bound at baseline.     In ED: Afebrile, HR 60s-70s, SBP 130s-150s, RR 16-18, sating 96-98% on 3L O2NC. labs notable for WBC 10.9k, hsTrop 31->32, serum proBNP 250, lactate wnl. CXR (prelim) was clear. Given Tylenol 1g IV and Lasix 40mg IV. Admitted to Medicine for further management.        Allergies    Grapes (Hives)  No Known Drug Allergies  Peaches (Hives)  shellfish (Hives)    Intolerances    	    MEDICATIONS:  aspirin enteric coated 81 milliGRAM(s) Oral daily  enoxaparin Injectable 40 milliGRAM(s) SubCutaneous every 24 hours  furosemide   Injectable 40 milliGRAM(s) IV Push daily  hydrALAZINE 75 milliGRAM(s) Oral two times a day  losartan 100 milliGRAM(s) Oral daily  albuterol/ipratropium for Nebulization 3 milliLiter(s) Nebulizer every 6 hours PRN  montelukast 10 milliGRAM(s) Oral daily  tiotropium 2.5 MICROgram(s) Inhaler 2 Puff(s) Inhalation daily    acetaminophen     Tablet .. 650 milliGRAM(s) Oral every 6 hours PRN  baclofen 10 milliGRAM(s) Oral every 8 hours  busPIRone 20 milliGRAM(s) Oral <User Schedule>  DULoxetine 60 milliGRAM(s) Oral two times a day  gabapentin 300 milliGRAM(s) Oral <User Schedule>  melatonin 3 milliGRAM(s) Oral at bedtime  OLANZapine 20 milliGRAM(s) Oral at bedtime      atorvastatin 40 milliGRAM(s) Oral at bedtime  levothyroxine 125 MICROGram(s) Oral daily  predniSONE   Tablet 5 milliGRAM(s) Oral daily        PAST MEDICAL & SURGICAL HISTORY:  Hypertension      Hyperlipidemia      Anxiety      Graves disease      Kidney cancer, primary, with metastasis from kidney to other site, left  LEft sided- s/p nephrectomy only- no chemo or RT      Breast cancer in situ, left      COPD (chronic obstructive pulmonary disease)      Hypothyroid      DVT (deep venous thrombosis)  history of- not presently on A/C      Obesity      Sleep apnea      Asthma      S/P cholecystectomy      S/p nephrectomy  left      S/P breast biopsy  left      History of pelvic surgery  Pelvic Sling      History of eye surgery  7 surgeries secondary to grave&#x27;s disease      History of carpal tunnel release  right wrist      History of parathyroidectomy      S/P laminectomy  now bed and wheelchair ridden with severe pain          FAMILY HISTORY:  Family history of myocardial infarction  mother  at age 67 and father at age 67 of MI&#x27;s    Family history of hypertension  mother and father    Family history of diabetes mellitus (Sibling)  siblings    Family history of heart disease (Sibling)  brother has a PPM    Family history of thyroid cancer (Child)  daughter has thyroid cancer        SOCIAL HISTORY:    non smoker. indep in adl    REVIEW OF SYSTEMS:  See HPI, otherwise complete 10 point review of systems negative    [ ] All others negative	      PHYSICAL EXAM:  T(C): 37.1 (23 @ 07:16), Max: 37.1 (23 @ 07:16)  HR: 74 (23 @ 07:16) (68 - 76)  BP: 159/81 (23 @ 07:16) (135/65 - 159/81)  RR: 18 (23 @ 07:16) (16 - 18)  SpO2: 97% (23 @ 07:16) (96% - 98%)  Wt(kg): --  I&O's Summary      Appearance: No Acute Distress	  HEENT:  Normal oral mucosa, PERRL, EOMI	  Cardiovascular: Normal S1 S2, No JVD, No murmurs/rubs/gallops  Respiratory: Lungs clear to auscultation bilaterally  Gastrointestinal:  Soft, Non-tender, + BS	  Skin: No rashes, No ecchymoses, No cyanosis	  Neurologic: Non-focal  Extremities: No clubbing, cyanosis + edema  Vascular: Peripheral pulses palpable 2+ bilaterally  Psychiatry: A & O x 3, Mood & affect appropriate    Laboratory Data:	 	    CBC Full  -  ( 2023 05:22 )  WBC Count : 9.28 K/uL  Hemoglobin : 13.1 g/dL  Hematocrit : 43.4 %  Platelet Count - Automated : 174 K/uL  Mean Cell Volume : 97.5 fl  Mean Cell Hemoglobin : 29.4 pg  Mean Cell Hemoglobin Concentration : 30.2 gm/dL  Auto Neutrophil # : 6.44 K/uL  Auto Lymphocyte # : 1.50 K/uL  Auto Monocyte # : 1.01 K/uL  Auto Eosinophil # : 0.25 K/uL  Auto Basophil # : 0.05 K/uL  Auto Neutrophil % : 69.4 %  Auto Lymphocyte % : 16.2 %  Auto Monocyte % : 10.9 %  Auto Eosinophil % : 2.7 %  Auto Basophil % : 0.5 %        142  |  100  |  15  ----------------------------<  121<H>  3.4<L>   |  30  |  0.84      141  |  101  |  18  ----------------------------<  103<H>  4.0   |  29  |  0.87    Ca    9.4      2023 05:22  Ca    9.9      2023 19:36  Phos  4.4       Mg     1.8       Mg     1.9         TPro  6.9  /  Alb  3.8  /  TBili  0.8  /  DBili  x   /  AST  14  /  ALT  12  /  AlkPhos  105    TPro  6.5  /  Alb  3.6  /  TBili  0.7  /  DBili  x   /  AST  12  /  ALT  12  /  AlkPhos  97        proBNP: Serum Pro-Brain Natriuretic Peptide: 250 pg/mL ( @ 19:36)    Lipid Profile:   HgA1c:   TSH:       CARDIAC MARKERS:            Interpretation of Telemetry: 	    ECG:  	  RADIOLOGY:  OTHER: 	    PREVIOUS DIAGNOSTIC TESTING:    [ ] Echocardiogram:  [ ] Catheterization:  [ ] Stress Test:  	    Assessment:  72F w/ hx of kidney cancer c/b ?mets (s/p L partial nephrectomy, no chemo/RT), breast cancer in situ, prior DVT (not on AC), COPD (not on home O2), asthma, former smoker, anxiety, IBS with chronic diarrhea, Graves disease, Hypothyroidism, parathyroidectomy, HTN, HLD, NOEMY, obesity, venous insufficiency, prior COVID-19 infection (2021), presenting with worsening of chronic b/l LE swelling x2 weeks as well as development of LUE swelling x1 month.    Recs:  cardiac stable  generalized edema, likely secondary to body habitus, sedentary state and venous insufficiency, possible component of acute on chronic diastolic heart failure  cw diuretics with close monitoring of renal fxn  obtain TTE  obtain venous doppler of lower ext and LUE   vascular consultation  keeps legs elevated and wrapped      Advanced care planning forms were discussed. Code status including forceful chest compressions, defibrillation and intubation were discussed. The risks benefits and alternatives to pertinent cardiac procedures and interventions were discussed in detail and all questions were answered. Duration: 15 minutes.    Greater than 90 minutes spent on total encounter; more than 50% of the visit was spent counseling and/or coordinating care by the attending physician.   	  Juan Salcedo MD   Cardiovascular Diseases  [phone]    
Wound Surgery Consult Note:    HPI:  72F w/ hx of kidney cancer c/b ?mets (s/p L partial nephrectomy, no chemo/RT), breast cancer in situ, prior DVT (not on AC), COPD (not on home O2), asthma, former smoker, anxiety, IBS with chronic diarrhea, Graves disease, Hypothyroidism, parathyroidectomy, HTN, HLD, NOEMY, obesity, venous insufficiency, prior COVID-19 infection (2021), presenting with worsening of chronic b/l LE swelling x2 weeks. Also reported intermittent SOB and chest heaviness/pressure as well over the past two weeks. Stated she has also developed LUE swelling and b/l UE resting tremors over the past month. Swelling of extremities is associated with pain/tenderness. The b/l leg swelling has been present for years, but recently has been getting worse to the point where she is blisters and water coming out of the skin. Noted that everything that she has tried has not worked to improve the swelling in her legs (including ace bandage, leg elevation, leg compression machine). Pt reported that she stopped her Lasix 40mg PO daily medication about a month ago because all it did was make her uncomfortable with frequent urination without reducing the swelling. Has chronic diarrhea from IBS. Otherwise, denied f/c/n/v, CP, abdominal pain, dysuria, hematuria, hematochezia, melena, constipation. Pt is wheelchair bound at baseline.     In ED: Afebrile, HR 60s-70s, SBP 130s-150s, RR 16-18, sating 96-98% on 3L O2NC. labs notable for WBC 10.9k, hsTrop 31->32, serum proBNP 250, lactate wnl. CXR (prelim) was clear. Given Tylenol 1g IV and Lasix 40mg IV. Admitted to Medicine for further management. (2023 23:56)    Request for wound care consult for multiple areas of skin damage received from nursing. Ms. To was encountered on an alternating air with low air loss surface and was seen with her nurse. She reported pain in bilateral lower legs which increased with touching/palpation. She stated that the swelling in her legs has increased but are normally swollen. She does not walk and is wheelchair bound. She reported pain in her groins and perineum but denied burning and itching. She stated that she has some numbness occasionally in her feet. She reported that she has tried ace wraps and pneumatic compression at home but these interventions have not been successful. She is grossly incontinent of urine and stool. This in addition to her immobility and chronic moisture in skin fold increase her risk for pressure injuries and impair healing.    PAST MEDICAL & SURGICAL HISTORY:  Hypertension  Hyperlipidemia  Anxiety  Graves disease  Kidney cancer, primary, with metastasis from kidney to other site, left  LEft sided- s/p nephrectomy only- no chemo or RT  Breast cancer in situ, left  COPD (chronic obstructive pulmonary disease)  Hypothyroid  DVT (deep venous thrombosis), history of- not presently on A/C  Obesity  Sleep apnea  Asthma  S/P cholecystectomy  S/p nephrectomy, left  S/P breast biopsy, left  History of pelvic surgery, Pelvic Sling  History of eye surgery, 7 surgeries secondary to grave&#x27;s disease  History of carpal tunnel release, right wrist  History of parathyroidectomy  S/P laminectomy, now bed and wheelchair ridden with severe pain    REVIEW OF SYSTEMS:    Constitutional:     [x ] negative [ ] fevers [ ] chills [ ] weight loss [ ] weight gain  HEENT:                  [x ] negative [ ] dry eyes [ ] eye irritation [ ] postnasal drip [ ] nasal congestion   CV:                         [x ] negative  [ ] chest pressure/heaviness [ ] orthopnea [ ] palpitations [ ] tachycardia  Resp:                     [ ] negative [ ] cough [x ] shortness of breath [x ] dyspnea [ ] wheezing [ ] sputum [ ] hemoptysis  GI:                          [ ] negative [ ] nausea [ ] vomiting [ ] diarrhea [ ] constipation [ ] abd pain [ ] dysphagia [x ] incontinent of bowel  :                        [ ] negative [ ] dysuria [ ] nocturia [ ] hematuria [ ] increased urinary frequency [ x] incontinent of urine  Musculoskeletal:     [ ] negative [ ] back pain [ ] myalgias [ ] arthralgias [ ] fracture [ x] non-ambulatory  Skin:                       [ ] negative [x ] rash [ ] itch [x ] wound [x ] skin discoloration  Neurological:        [ ] negative [ ] headache [ ] dizziness [ ] syncope [ ] weakness [ x] numbness  Psychiatric:           [ ] negative [ x] anxiety [ ] depression  Endocrine:            [ ] negative [ ] diabetes [ x] thyroid problem  Heme/Lymph:      [x ] negative [ ] anemia [ ] bleeding problem  Allergic/Immune: [x ] negative [ ] itchy eyes [ ] nasal discharge [ ] hives [ ] angioedema    [x ] All other systems negative    MEDICATIONS  (STANDING):  aspirin enteric coated 81 milliGRAM(s) Oral daily  atorvastatin 40 milliGRAM(s) Oral at bedtime  baclofen 10 milliGRAM(s) Oral every 8 hours  busPIRone 20 milliGRAM(s) Oral <User Schedule>  DULoxetine 60 milliGRAM(s) Oral two times a day  enoxaparin Injectable 40 milliGRAM(s) SubCutaneous every 24 hours  furosemide   Injectable 40 milliGRAM(s) IV Push daily  gabapentin 300 milliGRAM(s) Oral <User Schedule>  hydrALAZINE 75 milliGRAM(s) Oral two times a day  levothyroxine 125 MICROGram(s) Oral daily  losartan 100 milliGRAM(s) Oral daily  melatonin 3 milliGRAM(s) Oral at bedtime  montelukast 10 milliGRAM(s) Oral daily  OLANZapine 20 milliGRAM(s) Oral at bedtime  predniSONE   Tablet 5 milliGRAM(s) Oral daily  tiotropium 2.5 MICROgram(s) Inhaler 2 Puff(s) Inhalation daily    MEDICATIONS  (PRN):  acetaminophen     Tablet .. 650 milliGRAM(s) Oral every 6 hours PRN Temp greater or equal to 38C (100.4F), Mild Pain (1 - 3)  albuterol/ipratropium for Nebulization 3 milliLiter(s) Nebulizer every 6 hours PRN Shortness of Breath and/or Wheezing    Allergies    Grapes (Hives)  No Known Drug Allergies  Peaches (Hives)  shellfish (Hives)    Intolerances    SOCIAL HISTORY:  , Denies smoking, ETOH, drugs    FAMILY HISTORY:  Family history of myocardial infarction  mother  at age 67 and father at age 67 of MI&#x27;s    Family history of hypertension  mother and father    Family history of diabetes mellitus (Sibling)  siblings    Family history of heart disease (Sibling)  brother has a PPM    Family history of thyroid cancer (Child)  daughter has thyroid cancer    Vital Signs Last 24 Hrs  T(C): 36.5 (2023 12:09), Max: 36.8 (2023 20:33)  T(F): 97.7 (2023 12:09), Max: 98.2 (2023 20:33)  HR: 71 (2023 17:27) (71 - 82)  BP: 126/68 (2023 17:27) (114/65 - 136/72)  BP(mean): --  RR: 18 (2023 12:09) (16 - 19)  SpO2: 93% (2023 12:09) (93% - 97%)    Parameters below as of 2023 12:09  Patient On (Oxygen Delivery Method): nasal cannula    Physical Exam:  General: Alert, obese  Ophthamology: sclera clear  ENMT: moist mucous membranes, trachea midline  Respiratory: equal chest rise with respirations  Gastrointestinal: soft NT/ND  Neurology: verbal, following commands  Psych: calm, appropriate  Musculoskeletal: no contractures  Vascular: BLE edema equal  Skin:  Sacral/bilateral groins and buttocks,  with bright red erythema and light purple discolored intact skin in the gluteal cleft and extending to bilateral buttocks, no necrotic tissue, and scant serosanguinous drainage  Posterior thorax, lower abdominal, skin folds and perineum - bright red erythema, skin weeping, intact, +TTP  Right anterior lower leg with dry scab, no drainage, L 3.5cm x W 3cm x D unknown  Left lower leg in gaiter area with patches of flaky skin and lipodermatosclerosis  BLE with edema, +TTP and increased warmth, R>L  No odor, tenderness, induration, fluctuance    LABS:      140  |  98  |  13  ----------------------------<  137<H>  3.6   |  30  |  0.76    Ca    9.6      2023 11:11  Phos  4.4       Mg     1.8         TPro  6.9  /  Alb  3.8  /  TBili  0.8  /  DBili  x   /  AST  14  /  ALT  12  /  AlkPhos  105                            13.1   9.28  )-----------( 174      ( 2023 05:22 )             43.4           RADIOLOGY & ADDITIONAL STUDIES:    EXAM:  DUPLEX SCAN EXT VEINS LOWER BI                          PROCEDURE DATE:  2023      INTERPRETATION:  CLINICAL INFORMATION: 72-year-old with lower extremity   swelling and edema. Assess for DVT    COMPARISON: 2022    TECHNIQUE: Duplex sonography of the BILATERAL LOWER extremity veins with   color and spectral Doppler, with and without compression. Examination is   limited by body habitus.    FINDINGS:    RIGHT:  Normal compressibility of the RIGHT common femoral, femoral and popliteal   veins.  Doppler examination shows normal spontaneous and phasic flow.  The calf veins were not visualized.    LEFT:  Normal compressibility of the LEFT common femoral, femoral and popliteal   veins.  Doppler examination shows normal spontaneous and phasic flow.  The calf veins were not visualized.    IMPRESSION: Limited study.  No evidence of deep venous thrombosis in either lower extremity in the   visualized venous segments.  The calf veins were not visualized.

## 2023-01-06 LAB
ANION GAP SERPL CALC-SCNC: 9 MMOL/L — SIGNIFICANT CHANGE UP (ref 5–17)
BUN SERPL-MCNC: 15 MG/DL — SIGNIFICANT CHANGE UP (ref 7–23)
CALCIUM SERPL-MCNC: 9.4 MG/DL — SIGNIFICANT CHANGE UP (ref 8.4–10.5)
CHLORIDE SERPL-SCNC: 102 MMOL/L — SIGNIFICANT CHANGE UP (ref 96–108)
CO2 SERPL-SCNC: 31 MMOL/L — SIGNIFICANT CHANGE UP (ref 22–31)
CREAT SERPL-MCNC: 0.83 MG/DL — SIGNIFICANT CHANGE UP (ref 0.5–1.3)
CULTURE RESULTS: SIGNIFICANT CHANGE UP
EGFR: 74 ML/MIN/1.73M2 — SIGNIFICANT CHANGE UP
GLUCOSE SERPL-MCNC: 95 MG/DL — SIGNIFICANT CHANGE UP (ref 70–99)
POTASSIUM SERPL-MCNC: 3.7 MMOL/L — SIGNIFICANT CHANGE UP (ref 3.5–5.3)
POTASSIUM SERPL-SCNC: 3.7 MMOL/L — SIGNIFICANT CHANGE UP (ref 3.5–5.3)
SODIUM SERPL-SCNC: 142 MMOL/L — SIGNIFICANT CHANGE UP (ref 135–145)
SPECIMEN SOURCE: SIGNIFICANT CHANGE UP

## 2023-01-06 RX ORDER — CLOTRIMAZOLE AND BETAMETHASONE DIPROPIONATE 10; .5 MG/G; MG/G
1 CREAM TOPICAL
Refills: 0 | Status: DISCONTINUED | OUTPATIENT
Start: 2023-01-06 | End: 2023-01-12

## 2023-01-06 RX ORDER — NYSTATIN CREAM 100000 [USP'U]/G
1 CREAM TOPICAL
Refills: 0 | Status: DISCONTINUED | OUTPATIENT
Start: 2023-01-06 | End: 2023-01-12

## 2023-01-06 RX ORDER — NYSTATIN CREAM 100000 [USP'U]/G
1 CREAM TOPICAL
Refills: 0 | Status: DISCONTINUED | OUTPATIENT
Start: 2023-01-06 | End: 2023-01-06

## 2023-01-06 RX ORDER — POTASSIUM CHLORIDE 20 MEQ
40 PACKET (EA) ORAL ONCE
Refills: 0 | Status: COMPLETED | OUTPATIENT
Start: 2023-01-06 | End: 2023-01-06

## 2023-01-06 RX ADMIN — Medication 75 MILLIGRAM(S): at 17:08

## 2023-01-06 RX ADMIN — OLANZAPINE 20 MILLIGRAM(S): 15 TABLET, FILM COATED ORAL at 20:57

## 2023-01-06 RX ADMIN — Medication 5 MILLIGRAM(S): at 05:08

## 2023-01-06 RX ADMIN — Medication 75 MILLIGRAM(S): at 05:06

## 2023-01-06 RX ADMIN — Medication 125 MICROGRAM(S): at 05:06

## 2023-01-06 RX ADMIN — Medication 40 MILLIEQUIVALENT(S): at 21:15

## 2023-01-06 RX ADMIN — NYSTATIN CREAM 1 APPLICATION(S): 100000 CREAM TOPICAL at 21:07

## 2023-01-06 RX ADMIN — GABAPENTIN 300 MILLIGRAM(S): 400 CAPSULE ORAL at 12:46

## 2023-01-06 RX ADMIN — TIOTROPIUM BROMIDE 2 PUFF(S): 18 CAPSULE ORAL; RESPIRATORY (INHALATION) at 12:46

## 2023-01-06 RX ADMIN — Medication 20 MILLIGRAM(S): at 12:46

## 2023-01-06 RX ADMIN — Medication 3 MILLIGRAM(S): at 21:06

## 2023-01-06 RX ADMIN — Medication 1 APPLICATION(S): at 16:27

## 2023-01-06 RX ADMIN — LOSARTAN POTASSIUM 100 MILLIGRAM(S): 100 TABLET, FILM COATED ORAL at 05:07

## 2023-01-06 RX ADMIN — GABAPENTIN 300 MILLIGRAM(S): 400 CAPSULE ORAL at 21:11

## 2023-01-06 RX ADMIN — DULOXETINE HYDROCHLORIDE 60 MILLIGRAM(S): 30 CAPSULE, DELAYED RELEASE ORAL at 05:08

## 2023-01-06 RX ADMIN — CLOTRIMAZOLE AND BETAMETHASONE DIPROPIONATE 1 APPLICATION(S): 10; .5 CREAM TOPICAL at 21:06

## 2023-01-06 RX ADMIN — Medication 10 MILLIGRAM(S): at 12:46

## 2023-01-06 RX ADMIN — DULOXETINE HYDROCHLORIDE 60 MILLIGRAM(S): 30 CAPSULE, DELAYED RELEASE ORAL at 17:08

## 2023-01-06 RX ADMIN — ENOXAPARIN SODIUM 40 MILLIGRAM(S): 100 INJECTION SUBCUTANEOUS at 05:06

## 2023-01-06 RX ADMIN — CLOTRIMAZOLE AND BETAMETHASONE DIPROPIONATE 1 APPLICATION(S): 10; .5 CREAM TOPICAL at 05:08

## 2023-01-06 RX ADMIN — Medication 10 MILLIGRAM(S): at 21:05

## 2023-01-06 RX ADMIN — Medication 1 APPLICATORFUL: at 21:07

## 2023-01-06 RX ADMIN — NYSTATIN CREAM 1 APPLICATION(S): 100000 CREAM TOPICAL at 17:07

## 2023-01-06 RX ADMIN — ATORVASTATIN CALCIUM 40 MILLIGRAM(S): 80 TABLET, FILM COATED ORAL at 21:07

## 2023-01-06 RX ADMIN — Medication 40 MILLIGRAM(S): at 05:08

## 2023-01-06 RX ADMIN — MONTELUKAST 10 MILLIGRAM(S): 4 TABLET, CHEWABLE ORAL at 12:46

## 2023-01-06 RX ADMIN — Medication 81 MILLIGRAM(S): at 12:45

## 2023-01-06 RX ADMIN — Medication 10 MILLIGRAM(S): at 05:08

## 2023-01-06 NOTE — PHYSICAL THERAPY INITIAL EVALUATION ADULT - PERTINENT HX OF CURRENT PROBLEM, REHAB EVAL
Request for wound care consult for multiple areas of skin damage received from nursing. Ms. To was encountered on an alternating air with low air loss surface and was seen with her nurse. She reported pain in bilateral lower legs which increased with touching/palpation. She stated that the swelling in her legs has increased but are normally swollen. She does not walk and is wheelchair bound. She reported pain in her groins and perineum but denied burning and itching. She stated that she has some numbness occasionally in her feet. She reported that she has tried ace wraps and pneumatic compression at home but these interventions have not been successful. She is grossly incontinent of urine and stool. This in addition to her immobility and chronic moisture in skin fold increase her risk for pressure injuries and impair healing

## 2023-01-06 NOTE — PHYSICAL THERAPY INITIAL EVALUATION ADULT - MODALITIES TREATMENT COMMENTS
Last 5 Patient Entered Readings                                      Current 30 Day Average:      Recent Readings 4/4/2019 4/4/2019 4/3/2019 4/2/2019 4/1/2019    SBP (mmHg) 120 122 104 112 101    DBP (mmHg) 85 95 70 79 77    Pulse 77 61 75 80 78          5/6: Unable to LVM.  MyChart.  Will call in 1 week.   BLE +4 pitting swelling/edema for Profore multilayer wrapping therapy

## 2023-01-06 NOTE — PROGRESS NOTE ADULT - ASSESSMENT
( Note written / Date of service 01-06-23 )    ==================>> MEDICATIONS <<====================    ammonium lactate 12% Lotion 1 Application(s) Topical <User Schedule>  aspirin enteric coated 81 milliGRAM(s) Oral daily  atorvastatin 40 milliGRAM(s) Oral at bedtime  baclofen 10 milliGRAM(s) Oral every 8 hours  busPIRone 20 milliGRAM(s) Oral <User Schedule>  clotrimazole 1% Vaginal Cream 1 Applicatorful Vaginal at bedtime  clotrimazole/betamethasone Cream 1 Application(s) Topical two times a day  DULoxetine 60 milliGRAM(s) Oral two times a day  enoxaparin Injectable 40 milliGRAM(s) SubCutaneous every 24 hours  furosemide   Injectable 40 milliGRAM(s) IV Push daily  gabapentin 300 milliGRAM(s) Oral <User Schedule>  hydrALAZINE 75 milliGRAM(s) Oral two times a day  levothyroxine 125 MICROGram(s) Oral daily  losartan 100 milliGRAM(s) Oral daily  melatonin 3 milliGRAM(s) Oral at bedtime  montelukast 10 milliGRAM(s) Oral daily  OLANZapine 20 milliGRAM(s) Oral at bedtime  predniSONE   Tablet 5 milliGRAM(s) Oral daily  tiotropium 2.5 MICROgram(s) Inhaler 2 Puff(s) Inhalation daily    MEDICATIONS  (PRN):  acetaminophen     Tablet .. 650 milliGRAM(s) Oral every 6 hours PRN Temp greater or equal to 38C (100.4F), Mild Pain (1 - 3)  albuterol/ipratropium for Nebulization 3 milliLiter(s) Nebulizer every 6 hours PRN Shortness of Breath and/or Wheezing  loperamide 2 milliGRAM(s) Oral two times a day PRN Diarrhea    ___________  Active diet:  Diet, Regular:   DASH/TLC Sodium & Cholesterol Restricted (DASH)  ___________________    ==================>> VITAL SIGNS <<==================  Height (cm): 172.7  Weight (kg): 99.8  BMI (kg/m2): 33.5  Vital Signs Last 24 HrsT(C): 36.5 (01-06-23 @ 11:36)  T(F): 97.7 (01-06-23 @ 11:36), Max: 99.3 (01-05-23 @ 20:13)  HR: 76 (01-06-23 @ 11:36) (67 - 83)  BP: 121/67 (01-06-23 @ 11:36)  RR: 18 (01-06-23 @ 11:36) (16 - 18)  SpO2: 95% (01-06-23 @ 11:36) (92% - 95%)      CAPILLARY BLOOD GLUCOSE         ==================>> LAB AND IMAGING <<==================       01-06    142  |  102  |  15  ----------------------------<  95  3.7   |  31  |  0.83    Ca    9.4      06 Jan 2023 06:00      WBC count:   9.28 <<== ,  10.91 <<==   Hemoglobin:   13.1 <<==,  12.9 <<==  platelets:  174 <==, 183 <==    Creatinine:  0.83  <<==, 0.76  <<==, 0.84  <<==, 0.87  <<==  Sodium:   142  <==, 140  <==, 142  <==, 141  <==       AST:          14 <== , 12 <==      ALT:        12  <== , 12  <==      AP:        105  <=, 97  <=     Bili:        0.8  <=, 0.7  <=    ____________________________    M I C R O B I O L O G Y :         _________________________________________________________________________________________  ========>>  M E D I C A L   A T T E N D I N G    F O L L O W  U P  N O T E  <<=========  -----------------------------------------------------------------------------------------------------    - Patient seen and examined by me earlier today.   - In summary,  SHEKHAR VASQUEZ is a 72y year old woman admitted with edema, SOB  - Patient today overall doing ok, comfortable, eating OK. breathing ok ; asking when she's going home     ==================>> REVIEW OF SYSTEM <<=================    GEN: no fever, no chills, no pain  RESP: occasional SOB, no cough, no sputum  CVS: no chest pain, no palpitations,   GI: no abdominal pain, no nausea,   : no dysuria, no frequency, good urine output   Neuro: no headache, no dizziness  Derm : no itching, no rash    ==================>> PHYSICAL EXAM <<=================    GEN: A&O X 3 , NAD , comfortable, pleasant, calm  in bed   HEENT: NCAT, PERRL, MMM, hearing intact  Neck: supple , no JVD appreciated  CVS: S1S2 , regular , No M/R/G appreciated  PULM: CTA B/L,  limited exam due to body habitus.   ABD.: soft. non tender, non distended,  obese   Extrem: intact pulses , generalized edema >> ACE wrapped   PSYCH : normal mood,  not anxious                             ( Note written / Date of service 01-06-23 )    ==================>> MEDICATIONS <<====================    ammonium lactate 12% Lotion 1 Application(s) Topical <User Schedule>  aspirin enteric coated 81 milliGRAM(s) Oral daily  atorvastatin 40 milliGRAM(s) Oral at bedtime  baclofen 10 milliGRAM(s) Oral every 8 hours  busPIRone 20 milliGRAM(s) Oral <User Schedule>  clotrimazole 1% Vaginal Cream 1 Applicatorful Vaginal at bedtime  clotrimazole/betamethasone Cream 1 Application(s) Topical two times a day  DULoxetine 60 milliGRAM(s) Oral two times a day  enoxaparin Injectable 40 milliGRAM(s) SubCutaneous every 24 hours  furosemide   Injectable 40 milliGRAM(s) IV Push daily  gabapentin 300 milliGRAM(s) Oral <User Schedule>  hydrALAZINE 75 milliGRAM(s) Oral two times a day  levothyroxine 125 MICROGram(s) Oral daily  losartan 100 milliGRAM(s) Oral daily  melatonin 3 milliGRAM(s) Oral at bedtime  montelukast 10 milliGRAM(s) Oral daily  OLANZapine 20 milliGRAM(s) Oral at bedtime  predniSONE   Tablet 5 milliGRAM(s) Oral daily  tiotropium 2.5 MICROgram(s) Inhaler 2 Puff(s) Inhalation daily    MEDICATIONS  (PRN):  acetaminophen     Tablet .. 650 milliGRAM(s) Oral every 6 hours PRN Temp greater or equal to 38C (100.4F), Mild Pain (1 - 3)  albuterol/ipratropium for Nebulization 3 milliLiter(s) Nebulizer every 6 hours PRN Shortness of Breath and/or Wheezing  loperamide 2 milliGRAM(s) Oral two times a day PRN Diarrhea    ___________  Active diet:  Diet, Regular:   DASH/TLC Sodium & Cholesterol Restricted (DASH)  ___________________    ==================>> VITAL SIGNS <<==================    Height (cm): 172.7  Weight (kg): 99.8  BMI (kg/m2): 33.5  Vital Signs Last 24 HrsT(C): 36.5 (01-06-23 @ 11:36)  T(F): 97.7 (01-06-23 @ 11:36), Max: 99.3 (01-05-23 @ 20:13)  HR: 76 (01-06-23 @ 11:36) (67 - 83)  BP: 121/67 (01-06-23 @ 11:36)  RR: 18 (01-06-23 @ 11:36) (16 - 18)  SpO2: 95% (01-06-23 @ 11:36) (92% - 95%)       ==================>> LAB AND IMAGING <<==================       01-06    142  |  102  |  15  ----------------------------<  95  3.7   |  31  |  0.83    Ca    9.4      06 Jan 2023 06:00      WBC count:   9.28 <<== ,  10.91 <<==   Hemoglobin:   13.1 <<==,  12.9 <<==  platelets:  174 <==, 183 <==    Creatinine:  0.83  <<==, 0.76  <<==, 0.84  <<==, 0.87  <<==  Sodium:   142  <==, 140  <==, 142  <==, 141  <==       AST:          14 <== , 12 <==      ALT:        12  <== , 12  <==      AP:        105  <=, 97  <=     Bili:        0.8  <=, 0.7  <=      < from: Limited Transthoracic Echo (01.04.23 @ 13:54) >  Conclusions:  This was a very technically difficult study with poor image  quality. Patient was not positioned optimally and not able  to tolerate a complete echo study.  1. Endocardium not well visualized; unable to evaluate left ventricular systolic function.  2. Right atrium not well visualized.  3. The right ventricle is not well visualized.  4. No pericardial effusion seen.  *** No previous Echo exam.    < end of copied text >  < from: [facility] Duplex Upper Ext Vein Scan, Left (01.05.23 @ 16:55) >  IMPRESSION:  No evidence of left upper extremity deep venous thrombosis.  < end of copied text >    < from: [facility] Duplex Lower Ext Vein Scan, Bilat (01.05.23 @ 16:54) >  IMPRESSION: Limited study.  No evidence of deep venous thrombosis in either lower extremity in the   visualized venous segments.  The calf veins were not visualized.  < end of copied text >    ___________________________________________________________________________________  ===============>>  A S S E S S M E N T   A N D   P L A N <<===============  ------------------------------------------------------------------------------------------    · Assessment	  72F w/ hx of kidney cancer c/b ?mets (s/p L partial nephrectomy, no chemo/RT), breast cancer in situ, prior DVT (not on AC), COPD (not on home O2), asthma, former smoker, anxiety, IBS with chronic diarrhea, Graves disease, Hypothyroidism, parathyroidectomy, HTN, HLD, NOEMY, obesity, venous insufficiency, presenting with worsening of chronic b/l LE swelling x2 weeks as well as development of LUE swelling x1 month. Associated intermittent SOB and chest pressure/heaviness. Suspect worsening of chronic lymphedema/venous insufficiency in setting of diuretic non-adherence vs less likely CHF vs thyroid-related    Problem/Plan - 1:  ·  Problem: Swelling of extremity.   ·  Plan: Presented with worsening of chronic b/l LE edema as well as new LUE edema. Associated intermittent SOB and chest pressure/heaviness. Stopped taking her home Lasix about ~1 month prior to admission. Serum proBNP 250. CXR grossly clear. Suspect  worsening of chronic lymphedema/venous insufficiency in setting of diuretic non-adherence vs less likely acute exacerbation of chronic diastolic CHF vs possible component of med side effect (amlodipine).    - hsTrop and EKG on admission not suggestive of ischemic cardiac etiology  - hold amlodipine for now given possible contribution to peripheral edema  - c/w leg elevation, ace bandage wrapping/compression stockings  - c/w Lasix 40mg IV daily for now; monitor volume status, kidney function, and BP to adjust as needed  - imaging as above   - TSH ok   - no U/A done but serum protein and albumin levels are ok   - patient generally with sedentary life style and likely contributing..         patient educated on diet, activity, weight loss... all questions answered.   - continue ACE wrapping and elevation    Problem/Plan - 2:  ·  Problem: Hypothyroidism.   ·  Plan: Hx of hypothyroidism, Graves disease, and s/p parathyroidectomy.  - c/w home levothyroxine  - TSH ok     Problem/Plan - 3:  ·  Problem: Resting tremor.   ·  Plan: Pt reported development of new resting tremors in b/l UEs over the past month. Unclear etiology, possibly 2/2 extrapyramidal symptoms from olanzapine vs development of Parkinsons vs ?side effect of gabapentin  - outpatient Neurology follow-up.    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   ·  Plan: Hx of kidney cancer c/b ?mets (s/p L partial nephrectomy, no chemo/RT). Of note, pt denied presence of mets and only noted hx of other cancer diagnoses (breast cancer and skin cancer).  - on admission, SCr 0.87 (baseline SCr ~0.6-0.8 in April 2022)  - monitor SCr and electrolytes; avoid nephrotoxins as able given solitary kidney.    Problem/Plan - 5:  ·  Problem: HTN (hypertension).   ·  Plan: - c/w home losartan and hydralazine  - holding home amlodipine for now given possible side effect of peripheral edema  - monitor BP and consider adding non-CCB antihypertensive med if needed.    Problem/Plan - 6:  ·  Problem: COPD with asthma.   ·  Plan: Hx of COPD/asthma (not on home O2). Pt reported some intermittent SOB but no wheezes appreciated on exam. Denied cough.  - Does not appear to be in COPD/asthma exacerbation at this time  - c/w home montelukast, prednisone, and Spiriva inhaler  - c/w duonebs q6h PRN  - wean off supplemental O2 as tolerated, target O2 sat of 88-92% in setting of COPD hx.    Problem/Plan - 7:  ·  Problem: Anxiety and depression.   ·  Plan: - c/w home buspirone, olanzapine, and duloxetine.    Problem/Plan - 8:  ·  Problem: Prophylactic measure.   ·  Plan: - DVT ppx: Lovenox  - Diet: DASH    - Dispo: likely home Monday    --------------------------------------------  Case discussed with patient  Education given on findings and plan of care  ___________________________  H. JANICE Welch.  Pager: [phone]

## 2023-01-07 ENCOUNTER — TRANSCRIPTION ENCOUNTER (OUTPATIENT)
Age: 73
End: 2023-01-07

## 2023-01-07 LAB
ANION GAP SERPL CALC-SCNC: 9 MMOL/L — SIGNIFICANT CHANGE UP (ref 5–17)
BUN SERPL-MCNC: 12 MG/DL — SIGNIFICANT CHANGE UP (ref 7–23)
CALCIUM SERPL-MCNC: 9.7 MG/DL — SIGNIFICANT CHANGE UP (ref 8.4–10.5)
CHLORIDE SERPL-SCNC: 101 MMOL/L — SIGNIFICANT CHANGE UP (ref 96–108)
CO2 SERPL-SCNC: 32 MMOL/L — HIGH (ref 22–31)
CREAT SERPL-MCNC: 0.71 MG/DL — SIGNIFICANT CHANGE UP (ref 0.5–1.3)
CULTURE RESULTS: SIGNIFICANT CHANGE UP
EGFR: 89 ML/MIN/1.73M2 — SIGNIFICANT CHANGE UP
GLUCOSE SERPL-MCNC: 109 MG/DL — HIGH (ref 70–99)
MAGNESIUM SERPL-MCNC: 2 MG/DL — SIGNIFICANT CHANGE UP (ref 1.6–2.6)
POTASSIUM SERPL-MCNC: 4.1 MMOL/L — SIGNIFICANT CHANGE UP (ref 3.5–5.3)
POTASSIUM SERPL-SCNC: 4.1 MMOL/L — SIGNIFICANT CHANGE UP (ref 3.5–5.3)
SODIUM SERPL-SCNC: 142 MMOL/L — SIGNIFICANT CHANGE UP (ref 135–145)
SPECIMEN SOURCE: SIGNIFICANT CHANGE UP

## 2023-01-07 RX ORDER — FUROSEMIDE 40 MG
40 TABLET ORAL DAILY
Refills: 0 | Status: DISCONTINUED | OUTPATIENT
Start: 2023-01-07 | End: 2023-01-12

## 2023-01-07 RX ADMIN — TIOTROPIUM BROMIDE 2 PUFF(S): 18 CAPSULE ORAL; RESPIRATORY (INHALATION) at 11:27

## 2023-01-07 RX ADMIN — Medication 75 MILLIGRAM(S): at 17:07

## 2023-01-07 RX ADMIN — Medication 10 MILLIGRAM(S): at 21:30

## 2023-01-07 RX ADMIN — Medication 5 MILLIGRAM(S): at 06:36

## 2023-01-07 RX ADMIN — DULOXETINE HYDROCHLORIDE 60 MILLIGRAM(S): 30 CAPSULE, DELAYED RELEASE ORAL at 23:39

## 2023-01-07 RX ADMIN — MONTELUKAST 10 MILLIGRAM(S): 4 TABLET, CHEWABLE ORAL at 11:27

## 2023-01-07 RX ADMIN — OLANZAPINE 20 MILLIGRAM(S): 15 TABLET, FILM COATED ORAL at 21:29

## 2023-01-07 RX ADMIN — DULOXETINE HYDROCHLORIDE 60 MILLIGRAM(S): 30 CAPSULE, DELAYED RELEASE ORAL at 09:08

## 2023-01-07 RX ADMIN — Medication 650 MILLIGRAM(S): at 16:10

## 2023-01-07 RX ADMIN — ATORVASTATIN CALCIUM 40 MILLIGRAM(S): 80 TABLET, FILM COATED ORAL at 21:29

## 2023-01-07 RX ADMIN — LOSARTAN POTASSIUM 100 MILLIGRAM(S): 100 TABLET, FILM COATED ORAL at 06:37

## 2023-01-07 RX ADMIN — GABAPENTIN 300 MILLIGRAM(S): 400 CAPSULE ORAL at 11:27

## 2023-01-07 RX ADMIN — Medication 40 MILLIGRAM(S): at 06:37

## 2023-01-07 RX ADMIN — NYSTATIN CREAM 1 APPLICATION(S): 100000 CREAM TOPICAL at 06:38

## 2023-01-07 RX ADMIN — Medication 20 MILLIGRAM(S): at 11:27

## 2023-01-07 RX ADMIN — Medication 125 MICROGRAM(S): at 06:36

## 2023-01-07 RX ADMIN — CLOTRIMAZOLE AND BETAMETHASONE DIPROPIONATE 1 APPLICATION(S): 10; .5 CREAM TOPICAL at 17:06

## 2023-01-07 RX ADMIN — GABAPENTIN 300 MILLIGRAM(S): 400 CAPSULE ORAL at 21:30

## 2023-01-07 RX ADMIN — Medication 1 APPLICATORFUL: at 21:30

## 2023-01-07 RX ADMIN — Medication 75 MILLIGRAM(S): at 06:36

## 2023-01-07 RX ADMIN — Medication 10 MILLIGRAM(S): at 06:36

## 2023-01-07 RX ADMIN — Medication 10 MILLIGRAM(S): at 14:20

## 2023-01-07 RX ADMIN — Medication 3 MILLIGRAM(S): at 21:38

## 2023-01-07 RX ADMIN — CLOTRIMAZOLE AND BETAMETHASONE DIPROPIONATE 1 APPLICATION(S): 10; .5 CREAM TOPICAL at 06:36

## 2023-01-07 RX ADMIN — NYSTATIN CREAM 1 APPLICATION(S): 100000 CREAM TOPICAL at 17:06

## 2023-01-07 RX ADMIN — Medication 81 MILLIGRAM(S): at 11:28

## 2023-01-07 RX ADMIN — ENOXAPARIN SODIUM 40 MILLIGRAM(S): 100 INJECTION SUBCUTANEOUS at 06:35

## 2023-01-07 RX ADMIN — Medication 650 MILLIGRAM(S): at 17:15

## 2023-01-07 NOTE — DISCHARGE NOTE PROVIDER - NSDCCPCAREPLAN_GEN_ALL_CORE_FT
PRINCIPAL DISCHARGE DIAGNOSIS  Diagnosis: Lower extremity edema  Assessment and Plan of Treatment: You presented with worsening of chronic b/l LE edema as well as new LUE edema.  Doppler studies of your lower and upper extremities did not show a DVT. Suspect  worsening of chronic lymphedema/venous insufficiency in setting of diuretic non-adherence vs less likely acute exacerbation of chronic diastolic CHF   Continue with your medications are prescribed  Continue with compression/ace wraps  Follow up with your vascular surgeon within 2 weeks  Follow up with your primary care provider within 2 weeks.      SECONDARY DISCHARGE DIAGNOSES  Diagnosis: Resting tremor  Assessment and Plan of Treatment: You reported a development of new resting tremors in your upper extremities over the past months.   Recommend outpatient Neurology follow-up.     PRINCIPAL DISCHARGE DIAGNOSIS  Diagnosis: Lower extremity edema  Assessment and Plan of Treatment: You presented with worsening of chronic b/l LE edema as well as new LUE edema.  Doppler studies of your lower and upper extremities did not show a DVT. Suspect  worsening of chronic lymphedema/venous insufficiency in setting of diuretic non-adherence vs less likely acute exacerbation of chronic diastolic CHF   Continue with your medications are prescribed  Continue with compression/ace wraps  Follow up with your vascular surgeon within 2 weeks  Follow up with your primary care provider within 2 weeks.      SECONDARY DISCHARGE DIAGNOSES  Diagnosis: Resting tremor  Assessment and Plan of Treatment: You reported a development of new resting tremors in your upper extremities over the past months.   Recommend outpatient Neurology follow-up.    Diagnosis: Hypothyroidism  Assessment and Plan of Treatment: stable. continue with synthroid.    Diagnosis: HTN (hypertension)  Assessment and Plan of Treatment: Low salt diet  Activity as tolerated.  Take all medication as prescribed.  Follow up with your medical doctor for routine blood pressure monitoring at your next visit.  Notify your doctor if you have any of the following symptoms:   Dizziness, Lightheadedness, Blurry vision, Headache, Chest pain, Shortness of breath      Diagnosis: Asthma with COPD  Assessment and Plan of Treatment: Call your Health Care provider upon arrival home to make a follow up appointment within one week.  Take all inhalers as prescribed by your Health Care Provider.  Take steroids as prescribed by your Health Care Provider.  If your cough increases infrequency and severity and/or you have shortness of breath or increased shortness of breath call your Health Care Provider.  If you develop fever, chills, night sweats, malaise, and/or change in mental status call your Health care Provider.  Nutrition is very important.  Eat small frequent meals.  Increase your activity as tolerated.  Do not stay in bed all day      Diagnosis: Anxiety  Assessment and Plan of Treatment: stable. continue with home buspirone, olanzapine, and duloxetine

## 2023-01-07 NOTE — DISCHARGE NOTE PROVIDER - HOSPITAL COURSE
72F w/ hx of kidney cancer c/b ?mets (s/p L partial nephrectomy, no chemo/RT), breast cancer in situ, prior DVT (not on AC), COPD (not on home O2), asthma, former smoker, anxiety, IBS with chronic diarrhea, Graves disease, Hypothyroidism, parathyroidectomy, HTN, HLD, NOEMY, obesity, venous insufficiency, presenting with worsening of chronic b/l LE swelling x2 weeks as well as development of LUE swelling x1 month. Associated intermittent SOB and chest pressure/heaviness. Suspect worsening of chronic lymphedema/venous insufficiency in setting of diuretic non-adherence vs less likely CHF vs thyroid-related.    Swelling of extremities  ·  Plan: Presented with worsening of chronic b/l LE edema as well as new LUE edema. Associated intermittent SOB and chest pressure/heaviness. Stopped taking her home Lasix about ~1 month prior to admission. Serum proBNP 250. CXR grossly clear. Suspect  worsening of chronic lymphedema/venous insufficiency in setting of diuretic non-adherence vs less likely acute exacerbation of chronic diastolic CHF vs possible component of med side effect (amlodipine).    - hsTrop and EKG on admission not suggestive of ischemic cardiac etiology  - hold amlodipine for now given possible contribution to peripheral edema  - c/w leg elevation, ace bandage wrapping/compression stockings  - c/w Lasix 40mg IV daily for now; monitor volume status, kidney function, and BP to adjust as needed  - imaging as above   - TSH ok   - no U/A done but serum protein and albumin levels are ok   - patient generally with sedentary life style and likely contributing..         patient educated on diet, activity, weight loss... all questions answered.   - continue ACE wrapping and elevation     Hypothyroidism.   ·  Plan: Hx of hypothyroidism, Graves disease, and s/p parathyroidectomy.  - c/w home levothyroxine  - TSH ok     Resting tremor.   ·  Plan: Pt reported development of new resting tremors in b/l UEs over the past month. Unclear etiology, possibly 2/2 extrapyramidal symptoms from olanzapine vs development of Parkinsons vs ?side effect of gabapentin  - outpatient Neurology follow-up.    Primary cancer of left kidney with metastasis from kidney to other site.   ·  Plan: Hx of kidney cancer c/b ?mets (s/p L partial nephrectomy, no chemo/RT). Of note, pt denied presence of mets and only noted hx of other cancer diagnoses (breast cancer and skin cancer).  - on admission, SCr 0.87 (baseline SCr ~0.6-0.8 in April 2022)  - monitor SCr and electrolytes; avoid nephrotoxins as able given solitary kidney.    HTN (hypertension).   ·  Plan: - c/w home losartan and hydralazine  - holding home amlodipine for now given possible side effect of peripheral edema  - monitor BP and consider adding non-CCB antihypertensive med if needed.    COPD with asthma.   ·  Plan: Hx of COPD/asthma (not on home O2). Pt reported some intermittent SOB but no wheezes appreciated on exam. Denied cough.  - Does not appear to be in COPD/asthma exacerbation at this time  - c/w home montelukast, prednisone, and Spiriva inhaler  - c/w duonebs q6h PRN  - wean off supplemental O2 as tolerated, target O2 sat of 88-92% in setting of COPD hx.    Anxiety and depression.   ·  Plan: - c/w home buspirone, olanzapine, and duloxetine.

## 2023-01-07 NOTE — PROGRESS NOTE ADULT - ASSESSMENT
72F w/      hx of kidney cancer c/b ?mets (s/p L partial nephrectomy, no chemo/RT),      breast cancer in situ, prior DVT (not on AC), COPD (not on home O2), asthma, former smoker, anxiety, IBS with chronic diarrhea, Graves disease, Hypothyroidism, parathyroidectomy, HTN, HLD, NOEMY, obesity, venous insufficiency, prior COVID-19 infection (Jan 2021),    presenting with worsening of chronic b/l LE swelling x2 weeks as well as development of LUE swelling x1 month.  generalized edema,  from  venous insufficiency,      and,  component of acute on chronic diastolic heart failure   lasix   TTE limited and nondiagnositc.  venous doppler of lower ext and LUE  neg for DVT  f/u wound care     rad< from: TTE with Doppler (w/Cont) (04.24.22 @ 13:36) >  -----------------------------------------------------------------------  CONCLUSIONS:  Technically difficult study.  1. Mitral annular calcification, otherwise normal mitral  valve. Minimal mitral regurgitation.  2. Endocardium not well visualized; grossly normal left  ventricular systolic function.  Estimated LVEF in the  60-65% range (by visual estimate).  Endocardial  visualization enhanced with intravenous injection of echo  contrast (Definity).  3. The right ventricle is not well visualized.  < end of copied text >       rad< from: Xray Cinesophagram Swallow Function w/ Contrast (09.28.20 @ 11:07) >  IMPRESSION:  Kyphosis. Cervical hardware evident extending from C3 beyond C7 with distal end not visualized. 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 Penetration without aspiration. A full report will be issued by the department of speech and hearing.  < end of copied text >

## 2023-01-07 NOTE — DISCHARGE NOTE PROVIDER - NSDCFUSCHEDAPPT_GEN_ALL_CORE_FT
Dane Salguero  [facility]  [id number] [facility]  Scheduled Appointment: 05/08/2023

## 2023-01-07 NOTE — DISCHARGE NOTE PROVIDER - NSDCCAREPROVSEEN_GEN_ALL_CORE_FT
Kamila Welch Kamila Lund  [facility]      [ Greater than 35 min spent for discharge services.   DIANE Welch ]

## 2023-01-07 NOTE — DISCHARGE NOTE PROVIDER - CARE PROVIDER_API CALL
Juan Salcedo)  Internal Medicine  [id number], [place] [address]  Phone: [phone]  Fax: [phone]  Established Patient  Follow Up Time: 2 weeks

## 2023-01-07 NOTE — DISCHARGE NOTE PROVIDER - NSDCFUADDAPPT_GEN_ALL_CORE_FT
APPTS ARE READY TO BE MADE: [x] YES    Best Family or Patient Contact (if needed):    Additional Information about above appointments (if needed):    1:   2:   3:     Other comments or requests:    APPTS ARE READY TO BE MADE: [x] YES    Best Family or Patient Contact (if needed):    Additional Information about above appointments (if needed):    1:   2:   3:     Other comments or requests: Patient was provided with follow up request details and was advised to call to schedule follow up within specified time frame.

## 2023-01-07 NOTE — DISCHARGE NOTE PROVIDER - NSDCMRMEDTOKEN_GEN_ALL_CORE_FT
amLODIPine 5 mg oral tablet: 1 tab(s) orally once a day  aspirin 81 mg oral delayed release tablet: 1 tab(s) orally once a day  atorvastatin 40 mg oral tablet: 1 tab(s) orally once a day  baclofen 10 mg oral tablet: 1 tab(s) orally 3 times a day  busPIRone 10 mg oral tablet: 2 tab(s) orally once a day at noon  DULoxetine 60 mg oral delayed release capsule: 1 cap(s) orally 2 times a day  furosemide 40 mg oral tablet: 1 tab(s) orally once a day (pt reports self stopping medication ~1 month ago)  gabapentin 300 mg oral capsule: 1 cap(s) orally 2 times a day (at noon and bedtime)  hydrALAZINE 25 mg oral tablet: 3 tab(s) orally 2 times a day  levothyroxine 125 mcg (0.125 mg) oral tablet: 1 tab(s) orally once a day  losartan 100 mg oral tablet: 1 tab(s) orally once a day  melatonin 3 mg oral tablet: 1 tab(s) orally once a day (at bedtime)  montelukast 10 mg oral tablet: 1 tab(s) orally once a day  OLANZapine 20 mg oral tablet: 1 tab(s) orally once a day (at bedtime)  predniSONE 5 mg oral tablet: 1 tab(s) orally once a day  Spiriva HandiHaler 18 mcg inhalation capsule: 1 cap(s) inhaled once a day  Vitamin B12 1000 mcg oral tablet: 1 tab(s) orally once a day   amLODIPine 5 mg oral tablet: 1 tab(s) orally once a day  aspirin 81 mg oral delayed release tablet: 1 tab(s) orally once a day  atorvastatin 40 mg oral tablet: 1 tab(s) orally once a day  baclofen 10 mg oral tablet: 1 tab(s) orally 3 times a day  busPIRone 10 mg oral tablet: 2 tab(s) orally once a day at noon  DULoxetine 60 mg oral delayed release capsule: 1 cap(s) orally 2 times a day  furosemide 40 mg oral tablet: 1 tab(s) orally once a day (pt reports self stopping medication ~1 month ago)  gabapentin 300 mg oral capsule: 1 cap(s) orally 2 times a day (at noon and bedtime)  hydrALAZINE 25 mg oral tablet: 3 tab(s) orally 2 times a day  levothyroxine 125 mcg (0.125 mg) oral tablet: 1 tab(s) orally once a day  losartan 100 mg oral tablet: 1 tab(s) orally once a day  melatonin 3 mg oral tablet: 1 tab(s) orally once a day (at bedtime)  montelukast 10 mg oral tablet: 1 tab(s) orally once a day  OLANZapine 20 mg oral tablet: 1 tab(s) orally once a day (at bedtime)  Oxygen concentrator at [facility] , portable inogen setting 2, DX : COPD , length of need 99months :   predniSONE 5 mg oral tablet: 1 tab(s) orally once a day  Spiriva HandiHaler 18 mcg inhalation capsule: 1 cap(s) inhaled once a day  Vitamin B12 1000 mcg oral tablet: 1 tab(s) orally once a day   aspirin 81 mg oral delayed release tablet: 1 tab(s) orally once a day  atorvastatin 40 mg oral tablet: 1 tab(s) orally once a day  baclofen 10 mg oral tablet: 1 tab(s) orally 3 times a day  busPIRone 10 mg oral tablet: 2 tab(s) orally once a day at noon  clotrimazole-betamethasone dipropionate 1%-0.05% topical cream: 1 application topically 2 times a day  DULoxetine 60 mg oral delayed release capsule: 1 cap(s) orally 2 times a day  furosemide 40 mg oral tablet: 1 tab(s) orally once a day  gabapentin 300 mg oral capsule: 1 cap(s) orally 2 times a day (at noon and bedtime)  hydrALAZINE 25 mg oral tablet: 3 tab(s) orally 2 times a day  levothyroxine 125 mcg (0.125 mg) oral tablet: 1 tab(s) orally once a day  losartan 100 mg oral tablet: 1 tab(s) orally once a day  melatonin 3 mg oral tablet: 1 tab(s) orally once a day (at bedtime)  montelukast 10 mg oral tablet: 1 tab(s) orally once a day  nystatin 100,000 units/g topical powder: 1 application topically 2 times a day  OLANZapine 20 mg oral tablet: 1 tab(s) orally once a day (at bedtime)  Oxygen concentrator at [facility] , portable inogen setting 2, DX : COPD , length of need 99months :   predniSONE 5 mg oral tablet: 1 tab(s) orally once a day  Spiriva HandiHaler 18 mcg inhalation capsule: 1 cap(s) inhaled once a day  Vitamin B12 1000 mcg oral tablet: 1 tab(s) orally once a day

## 2023-01-08 LAB
ANION GAP SERPL CALC-SCNC: 8 MMOL/L — SIGNIFICANT CHANGE UP (ref 5–17)
BUN SERPL-MCNC: 12 MG/DL — SIGNIFICANT CHANGE UP (ref 7–23)
CALCIUM SERPL-MCNC: 10.2 MG/DL — SIGNIFICANT CHANGE UP (ref 8.4–10.5)
CHLORIDE SERPL-SCNC: 100 MMOL/L — SIGNIFICANT CHANGE UP (ref 96–108)
CO2 SERPL-SCNC: 35 MMOL/L — HIGH (ref 22–31)
CREAT SERPL-MCNC: 0.72 MG/DL — SIGNIFICANT CHANGE UP (ref 0.5–1.3)
EGFR: 88 ML/MIN/1.73M2 — SIGNIFICANT CHANGE UP
GLUCOSE SERPL-MCNC: 109 MG/DL — HIGH (ref 70–99)
POTASSIUM SERPL-MCNC: 3.8 MMOL/L — SIGNIFICANT CHANGE UP (ref 3.5–5.3)
POTASSIUM SERPL-SCNC: 3.8 MMOL/L — SIGNIFICANT CHANGE UP (ref 3.5–5.3)
SODIUM SERPL-SCNC: 143 MMOL/L — SIGNIFICANT CHANGE UP (ref 135–145)

## 2023-01-08 RX ADMIN — Medication 650 MILLIGRAM(S): at 04:54

## 2023-01-08 RX ADMIN — Medication 10 MILLIGRAM(S): at 04:54

## 2023-01-08 RX ADMIN — Medication 75 MILLIGRAM(S): at 17:48

## 2023-01-08 RX ADMIN — CLOTRIMAZOLE AND BETAMETHASONE DIPROPIONATE 1 APPLICATION(S): 10; .5 CREAM TOPICAL at 04:55

## 2023-01-08 RX ADMIN — CLOTRIMAZOLE AND BETAMETHASONE DIPROPIONATE 1 APPLICATION(S): 10; .5 CREAM TOPICAL at 17:45

## 2023-01-08 RX ADMIN — Medication 3 MILLIGRAM(S): at 22:19

## 2023-01-08 RX ADMIN — DULOXETINE HYDROCHLORIDE 60 MILLIGRAM(S): 30 CAPSULE, DELAYED RELEASE ORAL at 17:46

## 2023-01-08 RX ADMIN — TIOTROPIUM BROMIDE 2 PUFF(S): 18 CAPSULE ORAL; RESPIRATORY (INHALATION) at 12:07

## 2023-01-08 RX ADMIN — Medication 10 MILLIGRAM(S): at 22:18

## 2023-01-08 RX ADMIN — ENOXAPARIN SODIUM 40 MILLIGRAM(S): 100 INJECTION SUBCUTANEOUS at 04:54

## 2023-01-08 RX ADMIN — MONTELUKAST 10 MILLIGRAM(S): 4 TABLET, CHEWABLE ORAL at 12:09

## 2023-01-08 RX ADMIN — Medication 125 MICROGRAM(S): at 04:55

## 2023-01-08 RX ADMIN — Medication 1 APPLICATORFUL: at 22:18

## 2023-01-08 RX ADMIN — Medication 75 MILLIGRAM(S): at 04:53

## 2023-01-08 RX ADMIN — Medication 10 MILLIGRAM(S): at 12:08

## 2023-01-08 RX ADMIN — OLANZAPINE 20 MILLIGRAM(S): 15 TABLET, FILM COATED ORAL at 22:19

## 2023-01-08 RX ADMIN — Medication 20 MILLIGRAM(S): at 12:07

## 2023-01-08 RX ADMIN — NYSTATIN CREAM 1 APPLICATION(S): 100000 CREAM TOPICAL at 17:44

## 2023-01-08 RX ADMIN — Medication 40 MILLIGRAM(S): at 04:59

## 2023-01-08 RX ADMIN — Medication 81 MILLIGRAM(S): at 12:07

## 2023-01-08 RX ADMIN — NYSTATIN CREAM 1 APPLICATION(S): 100000 CREAM TOPICAL at 04:55

## 2023-01-08 RX ADMIN — GABAPENTIN 300 MILLIGRAM(S): 400 CAPSULE ORAL at 22:18

## 2023-01-08 RX ADMIN — Medication 650 MILLIGRAM(S): at 05:30

## 2023-01-08 RX ADMIN — ATORVASTATIN CALCIUM 40 MILLIGRAM(S): 80 TABLET, FILM COATED ORAL at 22:19

## 2023-01-08 RX ADMIN — Medication 5 MILLIGRAM(S): at 04:53

## 2023-01-08 RX ADMIN — LOSARTAN POTASSIUM 100 MILLIGRAM(S): 100 TABLET, FILM COATED ORAL at 04:54

## 2023-01-08 RX ADMIN — DULOXETINE HYDROCHLORIDE 60 MILLIGRAM(S): 30 CAPSULE, DELAYED RELEASE ORAL at 04:53

## 2023-01-08 RX ADMIN — GABAPENTIN 300 MILLIGRAM(S): 400 CAPSULE ORAL at 12:08

## 2023-01-08 NOTE — PROGRESS NOTE ADULT - ASSESSMENT
( Note written / Date of service 01-08-23 )    ==================>> MEDICATIONS <<====================    aspirin enteric coated 81 milliGRAM(s) Oral daily  atorvastatin 40 milliGRAM(s) Oral at bedtime  baclofen 10 milliGRAM(s) Oral every 8 hours  busPIRone 20 milliGRAM(s) Oral <User Schedule>  clotrimazole 1% Vaginal Cream 1 Applicatorful Vaginal at bedtime  clotrimazole/betamethasone Cream 1 Application(s) Topical two times a day  DULoxetine 60 milliGRAM(s) Oral two times a day  enoxaparin Injectable 40 milliGRAM(s) SubCutaneous every 24 hours  furosemide    Tablet 40 milliGRAM(s) Oral daily  gabapentin 300 milliGRAM(s) Oral <User Schedule>  hydrALAZINE 75 milliGRAM(s) Oral two times a day  levothyroxine 125 MICROGram(s) Oral daily  losartan 100 milliGRAM(s) Oral daily  melatonin 3 milliGRAM(s) Oral at bedtime  montelukast 10 milliGRAM(s) Oral daily  nystatin Powder 1 Application(s) Topical two times a day  OLANZapine 20 milliGRAM(s) Oral at bedtime  predniSONE   Tablet 5 milliGRAM(s) Oral daily  tiotropium 2.5 MICROgram(s) Inhaler 2 Puff(s) Inhalation daily    MEDICATIONS  (PRN):  acetaminophen     Tablet .. 650 milliGRAM(s) Oral every 6 hours PRN Temp greater or equal to 38C (100.4F), Mild Pain (1 - 3)  albuterol/ipratropium for Nebulization 3 milliLiter(s) Nebulizer every 6 hours PRN Shortness of Breath and/or Wheezing  loperamide 2 milliGRAM(s) Oral two times a day PRN Diarrhea    ___________  Active diet:  Diet, Regular:   DASH/TLC Sodium & Cholesterol Restricted (DASH)  ___________________    ==================>> VITAL SIGNS <<==================    Vital Signs Last 24 HrsT(C): 36.3 (01-08-23 @ 12:52)  T(F): 97.3 (01-08-23 @ 12:52), Max: 97.7 (01-07-23 @ 20:55)  HR: 81 (01-08-23 @ 12:52) (71 - 86)  BP: 148/75 (01-08-23 @ 12:52)  RR: 18 (01-08-23 @ 12:52) (16 - 18)  SpO2: 97% (01-08-23 @ 12:52) (93% - 98%)      CAPILLARY BLOOD GLUCOSE         ==================>> LAB AND IMAGING <<==================       01-08    143  |  100  |  12  ----------------------------<  109<H>  3.8   |  35<H>  |  0.72    Ca    10.2      08 Jan 2023 06:15  Mg     2.0     01-07      WBC count:   9.28 <<== ,  10.91 <<==   Hemoglobin:   13.1 <<==,  12.9 <<==  platelets:  174 <==, 183 <==    Creatinine:  0.72  <<==, 0.71  <<==, 0.83  <<==, 0.76  <<==, 0.84  <<==, 0.87  <<==  Sodium:   143  <==, 142  <==, 142  <==, 140  <==, 142  <==, 141  <==       AST:          14 <== , 12 <==      ALT:        12  <== , 12  <==      AP:        105  <=, 97  <=     Bili:        0.8  <=, 0.7  <=    ____________________________    M I C R O B I O L O G Y :    Culture - Stool (collected 05 Jan 2023 10:57)  Source: .Stool Feces  Final Report (07 Jan 2023 17:07):    No enteric pathogens isolated.    (Stool culture examined for Salmonella,    Shigella, Campylobacter, Aeromonas, Plesiomonas,    Vibrio, E.coli O157 and Yersinia)           _________________________________________________________________________________________  ========>>  M E D I C A L   A T T E N D I N G    F O L L O W  U P  N O T E  <<=========  -----------------------------------------------------------------------------------------------------    - Patient seen and examined by me earlier today.   - In summary,  SHEKHAR VASQUEZ is a 72y year old woman admitted with edema, SOB  - Patient today overall doing ok, comfortable, eating OK. breathing ok ; asking when she's going home     ==================>> REVIEW OF SYSTEM <<=================    GEN: no fever, no chills, no pain  RESP: occasional SOB, no cough, no sputum  CVS: no chest pain, no palpitations,   GI: no abdominal pain, no nausea,   : no dysuria, no frequency, good urine output   Neuro: no headache, no dizziness  Derm : no itching, no rash    ==================>> PHYSICAL EXAM <<=================    GEN: A&O X 3 , NAD , comfortable, pleasant, calm  in bed   HEENT: NCAT, PERRL, MMM, hearing intact  Neck: supple , no JVD appreciated  CVS: S1S2 , regular , No M/R/G appreciated  PULM: CTA B/L,  limited exam due to body habitus.   ABD.: soft. non tender, non distended,  obese   Extrem: intact pulses , generalized edema >> ACE wrapped   PSYCH : normal mood,  not anxious                       ( Note written / Date of service 01-08-23 )    ==================>> MEDICATIONS <<====================    aspirin enteric coated 81 milliGRAM(s) Oral daily  atorvastatin 40 milliGRAM(s) Oral at bedtime  baclofen 10 milliGRAM(s) Oral every 8 hours  busPIRone 20 milliGRAM(s) Oral <User Schedule>  clotrimazole 1% Vaginal Cream 1 Applicatorful Vaginal at bedtime  clotrimazole/betamethasone Cream 1 Application(s) Topical two times a day  DULoxetine 60 milliGRAM(s) Oral two times a day  enoxaparin Injectable 40 milliGRAM(s) SubCutaneous every 24 hours  furosemide    Tablet 40 milliGRAM(s) Oral daily  gabapentin 300 milliGRAM(s) Oral <User Schedule>  hydrALAZINE 75 milliGRAM(s) Oral two times a day  levothyroxine 125 MICROGram(s) Oral daily  losartan 100 milliGRAM(s) Oral daily  melatonin 3 milliGRAM(s) Oral at bedtime  montelukast 10 milliGRAM(s) Oral daily  nystatin Powder 1 Application(s) Topical two times a day  OLANZapine 20 milliGRAM(s) Oral at bedtime  predniSONE   Tablet 5 milliGRAM(s) Oral daily  tiotropium 2.5 MICROgram(s) Inhaler 2 Puff(s) Inhalation daily    MEDICATIONS  (PRN):  acetaminophen     Tablet .. 650 milliGRAM(s) Oral every 6 hours PRN Temp greater or equal to 38C (100.4F), Mild Pain (1 - 3)  albuterol/ipratropium for Nebulization 3 milliLiter(s) Nebulizer every 6 hours PRN Shortness of Breath and/or Wheezing  loperamide 2 milliGRAM(s) Oral two times a day PRN Diarrhea    ___________  Active diet:  Diet, Regular:   DASH/TLC Sodium & Cholesterol Restricted (DASH)  ___________________    ==================>> VITAL SIGNS <<==================    Vital Signs Last 24 HrsT(C): 36.3 (01-08-23 @ 12:52)  T(F): 97.3 (01-08-23 @ 12:52), Max: 97.7 (01-07-23 @ 20:55)  HR: 81 (01-08-23 @ 12:52) (71 - 86)  BP: 148/75 (01-08-23 @ 12:52)  RR: 18 (01-08-23 @ 12:52) (16 - 18)  SpO2: 97% (01-08-23 @ 12:52) (93% - 98%)       ==================>> LAB AND IMAGING <<==================       01-08    143  |  100  |  12  ----------------------------<  109<H>  3.8   |  35<H>  |  0.72    Ca    10.2      08 Jan 2023 06:15  Mg     2.0     01-07    WBC count:   9.28 <<== ,  10.91 <<==   Hemoglobin:   13.1 <<==,  12.9 <<==  platelets:  174 <==, 183 <==    Creatinine:  0.72  <<==, 0.71  <<==, 0.83  <<==, 0.76  <<==, 0.84  <<==, 0.87  <<==  Sodium:   143  <==, 142  <==, 142  <==, 140  <==, 142  <==, 141  <==       AST:          14 <== , 12 <==      ALT:        12  <== , 12  <==      AP:        105  <=, 97  <=     Bili:        0.8  <=, 0.7  <=    ____________________________    M I C R O B I O L O G Y :    Culture - Stool (collected 05 Jan 2023 10:57)  Source: .Stool Feces  Final Report (07 Jan 2023 17:07):    No enteric pathogens isolated.    (Stool culture examined for Salmonella,    Shigella, Campylobacter, Aeromonas, Plesiomonas,    Vibrio, E.coli O157 and Yersinia)      < from: Limited Transthoracic Echo (01.04.23 @ 13:54) >  Conclusions:  This was a very technically difficult study with poor image  quality. Patient was not positioned optimally and not able  to tolerate a complete echo study.  1. Endocardium not well visualized; unable to evaluate left ventricular systolic function.  2. Right atrium not well visualized.  3. The right ventricle is not well visualized.  4. No pericardial effusion seen.  *** No previous Echo exam.    < end of copied text >  < from: [facility] Duplex Upper Ext Vein Scan, Left (01.05.23 @ 16:55) >  IMPRESSION:  No evidence of left upper extremity deep venous thrombosis.  < end of copied text >    < from: [facility] Duplex Lower Ext Vein Scan, Bilat (01.05.23 @ 16:54) >  IMPRESSION: Limited study.  No evidence of deep venous thrombosis in either lower extremity in the   visualized venous segments.  The calf veins were not visualized.  < end of copied text >    ___________________________________________________________________________________  ===============>>  A S S E S S M E N T   A N D   P L A N <<===============  ------------------------------------------------------------------------------------------    · Assessment	  72F w/ hx of kidney cancer c/b ?mets (s/p L partial nephrectomy, no chemo/RT), breast cancer in situ, prior DVT (not on AC), COPD (not on home O2), asthma, former smoker, anxiety, IBS with chronic diarrhea, Graves disease, Hypothyroidism, parathyroidectomy, HTN, HLD, NOEMY, obesity, venous insufficiency, presenting with worsening of chronic b/l LE swelling x2 weeks as well as development of LUE swelling x1 month. Associated intermittent SOB and chest pressure/heaviness. Suspect worsening of chronic lymphedema/venous insufficiency in setting of diuretic non-adherence vs less likely CHF vs thyroid-related    Problem/Plan - 1:  ·  Problem: Swelling of extremity.    worsening of chronic lymphedema/venous insufficiency in setting of diuretic non-adherence vs less likely acute exacerbation of chronic diastolic CHF vs possible component of med side effect (amlodipine).    - held amlodipine for now given possible contribution to peripheral edema   - c/w leg elevation, ace bandage wrapping ongoing   - c/w Lasix IV >> PO on DC       monitor volume status, kidney function, and BP   - imaging as above   - TSH ok   serum protein and albumin levels are ok   - patient generally with sedentary life style and likely contributing..         patient educated on diet, activity, weight loss... all questions answered.     Problem/Plan - 2:  ·  Problem: Hypothyroidism.   ·  Plan: Hx of hypothyroidism, Graves disease, and s/p parathyroidectomy.  - c/w home levothyroxine  - TSH ok     Problem/Plan - 3:  ·  Problem: Resting tremor.   ·  Plan: Pt reported development of new resting tremors in b/l UEs over the past month. Unclear etiology, possibly 2/2 extrapyramidal symptoms from olanzapine vs development of Parkinsons vs ?side effect of gabapentin  - outpatient Neurology follow-up.    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   ·  Plan: Hx of kidney cancer c/b ?mets (s/p L partial nephrectomy, no chemo/RT). Of note, pt denied presence of mets and only noted hx of other cancer diagnoses (breast cancer and skin cancer).  - on admission, SCr 0.87 (baseline SCr ~0.6-0.8 in April 2022)  - monitor SCr and electrolytes; avoid nephrotoxins as able given solitary kidney.    Problem/Plan - 5:  ·  Problem: HTN (hypertension).   ·  Plan: - c/w home losartan and hydralazine  - holding home amlodipine for now given possible side effect of peripheral edema  - monitor BP and consider adding non-CCB antihypertensive med if needed.    Problem/Plan - 6:  ·  Problem: COPD with asthma.   ·  Plan: Hx of COPD/asthma (not on home O2). Pt reported some intermittent SOB but no wheezes appreciated on exam. Denied cough.   - Does not appear to be in COPD/asthma exacerbation at this time   - c/w home montelukast, prednisone, and Spiriva inhaler  - c/w duonebs q6h PRN  - wean off supplemental O2 as tolerated, target O2 sat of 88-92% in setting of COPD hx.    Problem/Plan - 7:  ·  Problem: Anxiety and depression.   ·  Plan: - c/w home buspirone, olanzapine, and duloxetine.    Problem/Plan - 8:  ·  Problem: Prophylactic measure.   ·  Plan: - DVT ppx: Lovenox  - Diet: DASH    - Dispo: likely home ( need to have HHA set up  / resumed)       --------------------------------------------  Case discussed with patient  Education given on findings and plan of care  ___________________________  H. JANICE Welch.  Pager: [phone]

## 2023-01-09 LAB
HCT VFR BLD CALC: 44.1 % — SIGNIFICANT CHANGE UP (ref 34.5–45)
HGB BLD-MCNC: 13 G/DL — SIGNIFICANT CHANGE UP (ref 11.5–15.5)
MCHC RBC-ENTMCNC: 29.3 PG — SIGNIFICANT CHANGE UP (ref 27–34)
MCHC RBC-ENTMCNC: 29.5 GM/DL — LOW (ref 32–36)
MCV RBC AUTO: 99.3 FL — SIGNIFICANT CHANGE UP (ref 80–100)
NRBC # BLD: 0 /100 WBCS — SIGNIFICANT CHANGE UP (ref 0–0)
PLATELET # BLD AUTO: 147 K/UL — LOW (ref 150–400)
RBC # BLD: 4.44 M/UL — SIGNIFICANT CHANGE UP (ref 3.8–5.2)
RBC # FLD: 14.8 % — HIGH (ref 10.3–14.5)
WBC # BLD: 8.24 K/UL — SIGNIFICANT CHANGE UP (ref 3.8–10.5)
WBC # FLD AUTO: 8.24 K/UL — SIGNIFICANT CHANGE UP (ref 3.8–10.5)

## 2023-01-09 RX ADMIN — LOSARTAN POTASSIUM 100 MILLIGRAM(S): 100 TABLET, FILM COATED ORAL at 05:59

## 2023-01-09 RX ADMIN — Medication 81 MILLIGRAM(S): at 11:54

## 2023-01-09 RX ADMIN — TIOTROPIUM BROMIDE 2 PUFF(S): 18 CAPSULE ORAL; RESPIRATORY (INHALATION) at 11:54

## 2023-01-09 RX ADMIN — CLOTRIMAZOLE AND BETAMETHASONE DIPROPIONATE 1 APPLICATION(S): 10; .5 CREAM TOPICAL at 06:00

## 2023-01-09 RX ADMIN — Medication 10 MILLIGRAM(S): at 13:26

## 2023-01-09 RX ADMIN — GABAPENTIN 300 MILLIGRAM(S): 400 CAPSULE ORAL at 11:52

## 2023-01-09 RX ADMIN — Medication 75 MILLIGRAM(S): at 17:28

## 2023-01-09 RX ADMIN — NYSTATIN CREAM 1 APPLICATION(S): 100000 CREAM TOPICAL at 17:29

## 2023-01-09 RX ADMIN — OLANZAPINE 20 MILLIGRAM(S): 15 TABLET, FILM COATED ORAL at 21:02

## 2023-01-09 RX ADMIN — Medication 3 MILLIGRAM(S): at 21:04

## 2023-01-09 RX ADMIN — MONTELUKAST 10 MILLIGRAM(S): 4 TABLET, CHEWABLE ORAL at 11:52

## 2023-01-09 RX ADMIN — ENOXAPARIN SODIUM 40 MILLIGRAM(S): 100 INJECTION SUBCUTANEOUS at 06:00

## 2023-01-09 RX ADMIN — GABAPENTIN 300 MILLIGRAM(S): 400 CAPSULE ORAL at 21:03

## 2023-01-09 RX ADMIN — Medication 20 MILLIGRAM(S): at 11:51

## 2023-01-09 RX ADMIN — Medication 40 MILLIGRAM(S): at 05:59

## 2023-01-09 RX ADMIN — DULOXETINE HYDROCHLORIDE 60 MILLIGRAM(S): 30 CAPSULE, DELAYED RELEASE ORAL at 05:59

## 2023-01-09 RX ADMIN — Medication 10 MILLIGRAM(S): at 21:19

## 2023-01-09 RX ADMIN — Medication 75 MILLIGRAM(S): at 05:59

## 2023-01-09 RX ADMIN — ATORVASTATIN CALCIUM 40 MILLIGRAM(S): 80 TABLET, FILM COATED ORAL at 21:03

## 2023-01-09 RX ADMIN — Medication 125 MICROGRAM(S): at 05:59

## 2023-01-09 RX ADMIN — NYSTATIN CREAM 1 APPLICATION(S): 100000 CREAM TOPICAL at 06:00

## 2023-01-09 RX ADMIN — DULOXETINE HYDROCHLORIDE 60 MILLIGRAM(S): 30 CAPSULE, DELAYED RELEASE ORAL at 17:29

## 2023-01-09 RX ADMIN — CLOTRIMAZOLE AND BETAMETHASONE DIPROPIONATE 1 APPLICATION(S): 10; .5 CREAM TOPICAL at 17:29

## 2023-01-09 RX ADMIN — Medication 10 MILLIGRAM(S): at 06:00

## 2023-01-09 RX ADMIN — Medication 5 MILLIGRAM(S): at 05:59

## 2023-01-09 RX ADMIN — Medication 1 APPLICATORFUL: at 21:04

## 2023-01-09 NOTE — PROGRESS NOTE ADULT - ASSESSMENT
_________________________________________________________________________________________  ========>>  M E D I C A L   A T T E N D I N G    F O L L O W  U P  N O T E  <<=========  -----------------------------------------------------------------------------------------------------    - Patient seen and examined by me earlier today.   - In summary,  SHEKHAR VASQUEZ is a 72y year old woman admitted with edema, SOB  - Patient today overall doing ok, comfortable, eating OK. breathing ok ; asking when she's going home     ==================>> REVIEW OF SYSTEM <<=================    GEN: no fever, no chills, no pain  RESP: occasional SOB, no cough, no sputum  CVS: no chest pain, no palpitations,   GI: no abdominal pain, no nausea,   : no dysuria, no frequency, good urine output   Neuro: no headache, no dizziness  Derm : no itching, no rash    ==================>> PHYSICAL EXAM <<=================    GEN: A&O X 3 , NAD , comfortable, pleasant, calm  in bed   HEENT: NCAT, PERRL, MMM, hearing intact, on O2 via NC 1-2  L  Neck: supple , no JVD appreciated  CVS: S1S2 , regular , No M/R/G appreciated  PULM: CTA B/L,  limited exam due to body habitus.   ABD.: soft. non tender, non distended,  obese   Extrem: intact pulses , generalized edema >> ACE wrapped   PSYCH : normal mood,  not anxious                             ( Note written / Date of service 01-09-23 )    ==================>> MEDICATIONS <<====================    aspirin enteric coated 81 milliGRAM(s) Oral daily  atorvastatin 40 milliGRAM(s) Oral at bedtime  baclofen 10 milliGRAM(s) Oral every 8 hours  busPIRone 20 milliGRAM(s) Oral <User Schedule>  clotrimazole 1% Vaginal Cream 1 Applicatorful Vaginal at bedtime  clotrimazole/betamethasone Cream 1 Application(s) Topical two times a day  DULoxetine 60 milliGRAM(s) Oral two times a day  enoxaparin Injectable 40 milliGRAM(s) SubCutaneous every 24 hours  furosemide    Tablet 40 milliGRAM(s) Oral daily  gabapentin 300 milliGRAM(s) Oral <User Schedule>  hydrALAZINE 75 milliGRAM(s) Oral two times a day  levothyroxine 125 MICROGram(s) Oral daily  losartan 100 milliGRAM(s) Oral daily  melatonin 3 milliGRAM(s) Oral at bedtime  montelukast 10 milliGRAM(s) Oral daily  nystatin Powder 1 Application(s) Topical two times a day  OLANZapine 20 milliGRAM(s) Oral at bedtime  predniSONE   Tablet 5 milliGRAM(s) Oral daily  tiotropium 2.5 MICROgram(s) Inhaler 2 Puff(s) Inhalation daily    MEDICATIONS  (PRN):  acetaminophen     Tablet .. 650 milliGRAM(s) Oral every 6 hours PRN Temp greater or equal to 38C (100.4F), Mild Pain (1 - 3)  albuterol/ipratropium for Nebulization 3 milliLiter(s) Nebulizer every 6 hours PRN Shortness of Breath and/or Wheezing  loperamide 2 milliGRAM(s) Oral two times a day PRN Diarrhea    ___________  Active diet:  Diet, Regular:   DASH/TLC Sodium & Cholesterol Restricted (DASH)  ___________________    ==================>> VITAL SIGNS <<==================    Vital Signs Last 24 HrsT(C): 36.6 (01-09-23 @ 11:34)  T(F): 97.9 (01-09-23 @ 11:34), Max: 97.9 (01-09-23 @ 11:34)  HR: 88 (01-09-23 @ 11:34) (64 - 88)  BP: 143/77 (01-09-23 @ 11:34)  RR: 18 (01-09-23 @ 11:34) (18 - 18)  SpO2: 92% (01-09-23 @ 11:34) (90% - 99%)         ==================>> LAB AND IMAGING <<==================                        13.0   8.24  )-----------( 147      ( 09 Jan 2023 07:00 )             44.1        01-08    143  |  100  |  12  ----------------------------<  109<H>  3.8   |  35<H>  |  0.72    Ca    10.2      08 Jan 2023 06:15      WBC count:   8.24 <<==   Hemoglobin:   13.0 <<==  platelets:  147 <==, 174 <==, 183 <==    Creatinine:  0.72  <<==, 0.71  <<==, 0.83  <<==, 0.76  <<==, 0.84  <<==, 0.87  <<==  Sodium:   143  <==, 142  <==, 142  <==, 140  <==, 142  <==, 141  <==       AST:          14 <== , 12 <==      ALT:        12  <== , 12  <==      AP:        105  <=, 97  <=     Bili:        0.8  <=, 0.7  <=    ____________________________    M I C R O B I O L O G Y :    Culture - Stool (collected 05 Jan 2023 10:57)  Source: .Stool Feces  Final Report (07 Jan 2023 17:07):    No enteric pathogens isolated.    (Stool culture examined for Salmonella,    Shigella, Campylobacter, Aeromonas, Plesiomonas,    Vibrio, E.coli O157 and Yersinia)        < from: Limited Transthoracic Echo (01.04.23 @ 13:54) >  Conclusions:  This was a very technically difficult study with poor image  quality. Patient was not positioned optimally and not able  to tolerate a complete echo study.  1. Endocardium not well visualized; unable to evaluate left ventricular systolic function.  2. Right atrium not well visualized.  3. The right ventricle is not well visualized.  4. No pericardial effusion seen.  *** No previous Echo exam.    < end of copied text >  < from: [facility] Duplex Upper Ext Vein Scan, Left (01.05.23 @ 16:55) >  IMPRESSION:  No evidence of left upper extremity deep venous thrombosis.  < end of copied text >    < from: [facility] Duplex Lower Ext Vein Scan, Bilat (01.05.23 @ 16:54) >  IMPRESSION: Limited study.  No evidence of deep venous thrombosis in either lower extremity in the   visualized venous segments.  The calf veins were not visualized.  < end of copied text >    ___________________________________________________________________________________  ===============>>  A S S E S S M E N T   A N D   P L A N <<===============  ------------------------------------------------------------------------------------------    · Assessment	  72F w/ hx of kidney cancer c/b ?mets (s/p L partial nephrectomy, no chemo/RT), breast cancer in situ, prior DVT (not on AC), COPD (not on home O2), asthma, former smoker, anxiety, IBS with chronic diarrhea, Graves disease, Hypothyroidism, parathyroidectomy, HTN, HLD, NOEMY, obesity, venous insufficiency, presenting with worsening of chronic b/l LE swelling x2 weeks as well as development of LUE swelling x1 month. Associated intermittent SOB and chest pressure/heaviness. Suspect worsening of chronic lymphedema/venous insufficiency in setting of diuretic non-adherence vs less likely CHF vs thyroid-related    Problem/Plan - 1:  ·  Problem: Swelling of extremity.    worsening of chronic lymphedema/venous insufficiency in setting of diuretic non-adherence vs less likely acute exacerbation of chronic diastolic CHF vs possible component of med side effect (amlodipine).    - held amlodipine for now given possible contribution to peripheral edema   - c/w leg elevation, ace bandage wrapping ongoing   - c/w Lasix IV >> PO on DC       monitor volume status, kidney function, and BP   - imaging as above   - TSH ok   serum protein and albumin levels are ok   - patient generally with sedentary life style and likely contributing..         patient educated on diet, activity, weight loss... all questions answered.     Problem/Plan - 2:  ·  Problem: Hypothyroidism.   ·  Plan: Hx of hypothyroidism, Graves disease, and s/p parathyroidectomy.  - c/w home levothyroxine  - TSH ok     Problem/Plan - 3:  ·  Problem: Resting tremor.   ·  Plan: Pt reported development of new resting tremors in b/l UEs over the past month. Unclear etiology, possibly 2/2 extrapyramidal symptoms from olanzapine vs development of Parkinsons vs ?side effect of gabapentin  - outpatient Neurology follow-up.    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   ·  Plan: Hx of kidney cancer c/b ?mets (s/p L partial nephrectomy, no chemo/RT). Of note, pt denied presence of mets and only noted hx of other cancer diagnoses (breast cancer and skin cancer).  - on admission, SCr 0.87 (baseline SCr ~0.6-0.8 in April 2022)  - monitor SCr and electrolytes; avoid nephrotoxins as able given solitary kidney.    Problem/Plan - 5:  ·  Problem: HTN (hypertension).   ·  Plan: - c/w home losartan and hydralazine  - holding home amlodipine for now given possible side effect of peripheral edema  - monitor BP and consider adding non-CCB antihypertensive med if needed.    Problem/Plan - 6:  ·  Problem: COPD with asthma.   ·  Plan: Hx of COPD/asthma (not on home O2). Pt reported some intermittent SOB but no wheezes appreciated on exam. Denied cough.   - Does not appear to be in COPD/asthma exacerbation at this time   - c/w home montelukast, prednisone, and Spiriva inhaler  - c/w duonebs q6h PRN  - wean off supplemental O2 as tolerated, target O2 sat of 88-92% in setting of COPD hx.    Problem/Plan - 7:  ·  Problem: Anxiety and depression.   ·  Plan: - c/w home buspirone, olanzapine, and duloxetine.    Problem/Plan - 8:  ·  Problem: Prophylactic measure.   ·  Plan: - DVT ppx: Lovenox  - Diet: DASH    - Dispo: likely home ( need to have HHA set up  / resumed)       --------------------------------------------  Case discussed with patient, NP  Education given on findings and plan of care  ___________________________  H. JANICE Welch.  Pager: [phone]

## 2023-01-10 RX ADMIN — MONTELUKAST 10 MILLIGRAM(S): 4 TABLET, CHEWABLE ORAL at 11:41

## 2023-01-10 RX ADMIN — Medication 20 MILLIGRAM(S): at 13:38

## 2023-01-10 RX ADMIN — Medication 3 MILLIGRAM(S): at 21:07

## 2023-01-10 RX ADMIN — Medication 5 MILLIGRAM(S): at 06:24

## 2023-01-10 RX ADMIN — Medication 10 MILLIGRAM(S): at 13:38

## 2023-01-10 RX ADMIN — Medication 75 MILLIGRAM(S): at 17:08

## 2023-01-10 RX ADMIN — TIOTROPIUM BROMIDE 2 PUFF(S): 18 CAPSULE ORAL; RESPIRATORY (INHALATION) at 11:41

## 2023-01-10 RX ADMIN — Medication 75 MILLIGRAM(S): at 06:23

## 2023-01-10 RX ADMIN — CLOTRIMAZOLE AND BETAMETHASONE DIPROPIONATE 1 APPLICATION(S): 10; .5 CREAM TOPICAL at 17:07

## 2023-01-10 RX ADMIN — Medication 125 MICROGRAM(S): at 06:23

## 2023-01-10 RX ADMIN — NYSTATIN CREAM 1 APPLICATION(S): 100000 CREAM TOPICAL at 06:25

## 2023-01-10 RX ADMIN — GABAPENTIN 300 MILLIGRAM(S): 400 CAPSULE ORAL at 11:42

## 2023-01-10 RX ADMIN — CLOTRIMAZOLE AND BETAMETHASONE DIPROPIONATE 1 APPLICATION(S): 10; .5 CREAM TOPICAL at 06:26

## 2023-01-10 RX ADMIN — ENOXAPARIN SODIUM 40 MILLIGRAM(S): 100 INJECTION SUBCUTANEOUS at 06:25

## 2023-01-10 RX ADMIN — NYSTATIN CREAM 1 APPLICATION(S): 100000 CREAM TOPICAL at 17:07

## 2023-01-10 RX ADMIN — Medication 10 MILLIGRAM(S): at 21:07

## 2023-01-10 RX ADMIN — OLANZAPINE 20 MILLIGRAM(S): 15 TABLET, FILM COATED ORAL at 21:08

## 2023-01-10 RX ADMIN — Medication 1 APPLICATORFUL: at 21:08

## 2023-01-10 RX ADMIN — DULOXETINE HYDROCHLORIDE 60 MILLIGRAM(S): 30 CAPSULE, DELAYED RELEASE ORAL at 17:08

## 2023-01-10 RX ADMIN — LOSARTAN POTASSIUM 100 MILLIGRAM(S): 100 TABLET, FILM COATED ORAL at 06:23

## 2023-01-10 RX ADMIN — GABAPENTIN 300 MILLIGRAM(S): 400 CAPSULE ORAL at 21:07

## 2023-01-10 RX ADMIN — DULOXETINE HYDROCHLORIDE 60 MILLIGRAM(S): 30 CAPSULE, DELAYED RELEASE ORAL at 06:22

## 2023-01-10 RX ADMIN — ATORVASTATIN CALCIUM 40 MILLIGRAM(S): 80 TABLET, FILM COATED ORAL at 21:08

## 2023-01-10 RX ADMIN — Medication 10 MILLIGRAM(S): at 06:23

## 2023-01-10 RX ADMIN — Medication 81 MILLIGRAM(S): at 11:41

## 2023-01-10 RX ADMIN — Medication 40 MILLIGRAM(S): at 06:54

## 2023-01-10 NOTE — PROGRESS NOTE ADULT - ASSESSMENT
_________________________________________________________________________________________  ========>>  M E D I C A L   A T T E N D I N G    F O L L O W  U P  N O T E  <<=========  -----------------------------------------------------------------------------------------------------    - Patient seen and examined by me earlier today.   - In summary,  SHEKHAR VASQUEZ is a 72y year old woman admitted with edema, SOB  - Patient today overall doing ok, comfortable, eating OK. breathing ok      patient this afternoon off O2 >> monitoring ...  RN to give IS..     ==================>> REVIEW OF SYSTEM <<=================    GEN: no fever, no chills, no pain  RESP: occasional SOB, no cough, no sputum  CVS: no chest pain, no palpitations,   GI: no abdominal pain, no nausea,   : no dysuria, no frequency, good urine output   Neuro: no headache, no dizziness  Derm : no itching, no rash    ==================>> PHYSICAL EXAM <<=================    GEN: A&O X 3 , NAD , comfortable, pleasant, calm  in bed   HEENT: NCAT, PERRL, MMM, hearing intact  Neck: supple , no JVD appreciated  CVS: S1S2 , regular , No M/R/G appreciated  PULM: CTA B/L,  limited exam due to body habitus.   ABD.: soft. non tender, non distended,  obese   Extrem: intact pulses , generalized edema >> ACE wrapped   PSYCH : normal mood,  not anxious                         ( Note written / Date of service 01-10-23 )    ==================>> MEDICATIONS <<====================    aspirin enteric coated 81 milliGRAM(s) Oral daily  atorvastatin 40 milliGRAM(s) Oral at bedtime  baclofen 10 milliGRAM(s) Oral every 8 hours  busPIRone 20 milliGRAM(s) Oral <User Schedule>  clotrimazole 1% Vaginal Cream 1 Applicatorful Vaginal at bedtime  clotrimazole/betamethasone Cream 1 Application(s) Topical two times a day  DULoxetine 60 milliGRAM(s) Oral two times a day  enoxaparin Injectable 40 milliGRAM(s) SubCutaneous every 24 hours  furosemide    Tablet 40 milliGRAM(s) Oral daily  gabapentin 300 milliGRAM(s) Oral <User Schedule>  hydrALAZINE 75 milliGRAM(s) Oral two times a day  levothyroxine 125 MICROGram(s) Oral daily  losartan 100 milliGRAM(s) Oral daily  melatonin 3 milliGRAM(s) Oral at bedtime  montelukast 10 milliGRAM(s) Oral daily  nystatin Powder 1 Application(s) Topical two times a day  OLANZapine 20 milliGRAM(s) Oral at bedtime  predniSONE   Tablet 5 milliGRAM(s) Oral daily  tiotropium 2.5 MICROgram(s) Inhaler 2 Puff(s) Inhalation daily    MEDICATIONS  (PRN):  acetaminophen     Tablet .. 650 milliGRAM(s) Oral every 6 hours PRN Temp greater or equal to 38C (100.4F), Mild Pain (1 - 3)  albuterol/ipratropium for Nebulization 3 milliLiter(s) Nebulizer every 6 hours PRN Shortness of Breath and/or Wheezing  loperamide 2 milliGRAM(s) Oral two times a day PRN Diarrhea    ___________  Active diet:  Diet, Regular:   DASH/TLC Sodium & Cholesterol Restricted (DASH)  ___________________    ==================>> VITAL SIGNS <<==================    Vital Signs Last 24 HrsT(C): 36.3 (01-10-23 @ 11:15)  T(F): 97.4 (01-10-23 @ 11:15), Max: 98.2 (01-09-23 @ 19:59)  HR: 103 (01-10-23 @ 11:15) (66 - 103)  BP: 145/74 (01-10-23 @ 11:15)  RR: 18 (01-10-23 @ 11:15) (18 - 20)  SpO2: 90% (01-10-23 @ 11:15) (90% - 98%)       ==================>> LAB AND IMAGING <<==================                        13.0   8.24  )-----------( 147      ( 09 Jan 2023 07:00 )             44.1        Creatinine:  0.72  <<==, 0.71  <<==, 0.83  <<==, 0.76  <<==  Sodium:   143  <==, 142  <==, 142  <==, 140  <==       AST:          14 <== , 12 <==      ALT:        12  <== , 12  <==      AP:        105  <=, 97  <=     Bili:        0.8  <=, 0.7  <=    < from: Limited Transthoracic Echo (01.04.23 @ 13:54) >  Conclusions:  This was a very technically difficult study with poor image  quality. Patient was not positioned optimally and not able  to tolerate a complete echo study.  1. Endocardium not well visualized; unable to evaluate left ventricular systolic function.  2. Right atrium not well visualized.  3. The right ventricle is not well visualized.  4. No pericardial effusion seen.  *** No previous Echo exam.    < end of copied text >  < from: [facility] Duplex Upper Ext Vein Scan, Left (01.05.23 @ 16:55) >  IMPRESSION:  No evidence of left upper extremity deep venous thrombosis.  < end of copied text >    < from: [facility] Duplex Lower Ext Vein Scan, Dorianat (01.05.23 @ 16:54) >  IMPRESSION: Limited study.  No evidence of deep venous thrombosis in either lower extremity in the   visualized venous segments.  The calf veins were not visualized.  < end of copied text >    ___________________________________________________________________________________  ===============>>  A S S E S S M E N T   A N D   P L A N <<===============  ------------------------------------------------------------------------------------------    · Assessment	  72F w/ hx of kidney cancer c/b ?mets (s/p L partial nephrectomy, no chemo/RT), breast cancer in situ, prior DVT (not on AC), COPD (not on home O2), asthma, former smoker, anxiety, IBS with chronic diarrhea, Graves disease, Hypothyroidism, parathyroidectomy, HTN, HLD, NOEMY, obesity, venous insufficiency, presenting with worsening of chronic b/l LE swelling x2 weeks as well as development of LUE swelling x1 month. Associated intermittent SOB and chest pressure/heaviness. Suspect worsening of chronic lymphedema/venous insufficiency in setting of diuretic non-adherence vs less likely CHF vs thyroid-related    Problem/Plan - 1:  ·  Problem: Swelling of extremity.    worsening of chronic lymphedema/venous insufficiency in setting of diuretic non-adherence vs less likely acute exacerbation of chronic diastolic CHF vs possible component of med side effect (amlodipine).    - held amlodipine for now given possible contribution to peripheral edema   - c/w leg elevation, ace bandage wrapping ongoing   - c/w Lasix IV >> PO on DC       monitor volume status, kidney function, and BP   - imaging as above   - TSH ok   serum protein and albumin levels are ok   - patient generally with sedentary life style and likely contributing..   ( patient states she does not walk at home !)         patient educated on diet, activity, weight loss... all questions answered.     Problem/Plan - 2:  ·  Problem: Hypothyroidism.   ·  Plan: Hx of hypothyroidism, Graves disease, and s/p parathyroidectomy.  - c/w home levothyroxine  - TSH ok     Problem/Plan - 3:  ·  Problem: Resting tremor.   ·  Plan: Pt reported development of new resting tremors in b/l UEs over the past month. Unclear etiology, possibly 2/2 extrapyramidal symptoms from olanzapine vs development of Parkinsons vs ?side effect of gabapentin  - outpatient Neurology follow-up.    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   ·  Plan: Hx of kidney cancer   - monitor SCr and electrolytes; avoid nephrotoxins as able given solitary kidney.  - onc f/u as OP    Problem/Plan - 5:  ·  Problem: HTN (hypertension).   ·  Plan: - c/w home losartan and hydralazine  - holding home amlodipine for now given possible side effect of peripheral edema  - monitor BP and consider adding non-CCB antihypertensive med if needed.    Problem/Plan - 6:  ·  Problem: COPD with asthma.   ·  Plan: Hx of COPD/asthma (not on home O2). Pt reported some intermittent SOB but no wheezes appreciated on exam. Denied cough.   - Does not appear to be in COPD/asthma exacerbation at this time   - c/w home montelukast, prednisone, and Spiriva inhaler  - c/w duonebs q6h PRN  - wean off supplemental O2 as tolerated, target O2 sat of 88-92% in setting of COPD hx.  i- incentive spirometer     Problem/Plan - 7:  ·  Problem: Anxiety and depression.   ·  Plan: - c/w home buspirone, olanzapine, and duloxetine.    Problem/Plan - 8:  ·  Problem: Prophylactic measure.   ·  Plan: - DVT ppx: Lovenox  - Diet: DASH    - Dispo: home  tomorrow if remains stable ( need to have HHA set up  / resumed)       --------------------------------------------  Case discussed with patient, NP, cardio.   Education given on findings and plan of care  ___________________________  H. JANICE Welch.  Pager: [phone]

## 2023-01-10 NOTE — PROGRESS NOTE ADULT - ASSESSMENT
( Note written / Date of service 01-10-23 )    ==================>> MEDICATIONS <<====================    aspirin enteric coated 81 milliGRAM(s) Oral daily  atorvastatin 40 milliGRAM(s) Oral at bedtime  baclofen 10 milliGRAM(s) Oral every 8 hours  busPIRone 20 milliGRAM(s) Oral <User Schedule>  clotrimazole 1% Vaginal Cream 1 Applicatorful Vaginal at bedtime  clotrimazole/betamethasone Cream 1 Application(s) Topical two times a day  DULoxetine 60 milliGRAM(s) Oral two times a day  enoxaparin Injectable 40 milliGRAM(s) SubCutaneous every 24 hours  furosemide    Tablet 40 milliGRAM(s) Oral daily  gabapentin 300 milliGRAM(s) Oral <User Schedule>  hydrALAZINE 75 milliGRAM(s) Oral two times a day  levothyroxine 125 MICROGram(s) Oral daily  losartan 100 milliGRAM(s) Oral daily  melatonin 3 milliGRAM(s) Oral at bedtime  montelukast 10 milliGRAM(s) Oral daily  nystatin Powder 1 Application(s) Topical two times a day  OLANZapine 20 milliGRAM(s) Oral at bedtime  predniSONE   Tablet 5 milliGRAM(s) Oral daily  tiotropium 2.5 MICROgram(s) Inhaler 2 Puff(s) Inhalation daily    MEDICATIONS  (PRN):  acetaminophen     Tablet .. 650 milliGRAM(s) Oral every 6 hours PRN Temp greater or equal to 38C (100.4F), Mild Pain (1 - 3)  albuterol/ipratropium for Nebulization 3 milliLiter(s) Nebulizer every 6 hours PRN Shortness of Breath and/or Wheezing  loperamide 2 milliGRAM(s) Oral two times a day PRN Diarrhea    ___________  Active diet:  Diet, Regular:   DASH/TLC Sodium & Cholesterol Restricted (DASH)  ___________________    ==================>> VITAL SIGNS <<==================    Vital Signs Last 24 HrsT(C): 36.3 (01-10-23 @ 11:15)  T(F): 97.4 (01-10-23 @ 11:15), Max: 98.2 (01-09-23 @ 19:59)  HR: 103 (01-10-23 @ 11:15) (66 - 103)  BP: 145/74 (01-10-23 @ 11:15)  RR: 18 (01-10-23 @ 11:15) (18 - 20)  SpO2: 90% (01-10-23 @ 11:15) (90% - 98%)      CAPILLARY BLOOD GLUCOSE         ==================>> LAB AND IMAGING <<==================                        13.0   8.24  )-----------( 147      ( 09 Jan 2023 07:00 )             44.1              WBC count:   8.24 <<==   Hemoglobin:   13.0 <<==  platelets:  147 <==    Creatinine:  0.72  <<==, 0.71  <<==, 0.83  <<==, 0.76  <<==  Sodium:   143  <==, 142  <==, 142  <==, 140  <==       AST:          14 <== , 12 <==      ALT:        12  <== , 12  <==      AP:        105  <=, 97  <=     Bili:        0.8  <=, 0.7  <=    ____________________________    M I C R O B I O L O G Y :    Culture - Stool (collected 05 Jan 2023 10:57)  Source: .Stool Feces  Final Report (07 Jan 2023 17:07):    No enteric pathogens isolated.    (Stool culture examined for Salmonella,    Shigella, Campylobacter, Aeromonas, Plesiomonas,    Vibrio, E.coli O157 and Yersinia)

## 2023-01-11 RX ADMIN — DULOXETINE HYDROCHLORIDE 60 MILLIGRAM(S): 30 CAPSULE, DELAYED RELEASE ORAL at 18:06

## 2023-01-11 RX ADMIN — ATORVASTATIN CALCIUM 40 MILLIGRAM(S): 80 TABLET, FILM COATED ORAL at 21:41

## 2023-01-11 RX ADMIN — LOSARTAN POTASSIUM 100 MILLIGRAM(S): 100 TABLET, FILM COATED ORAL at 05:28

## 2023-01-11 RX ADMIN — Medication 3 MILLIGRAM(S): at 21:41

## 2023-01-11 RX ADMIN — NYSTATIN CREAM 1 APPLICATION(S): 100000 CREAM TOPICAL at 05:30

## 2023-01-11 RX ADMIN — DULOXETINE HYDROCHLORIDE 60 MILLIGRAM(S): 30 CAPSULE, DELAYED RELEASE ORAL at 05:28

## 2023-01-11 RX ADMIN — GABAPENTIN 300 MILLIGRAM(S): 400 CAPSULE ORAL at 11:40

## 2023-01-11 RX ADMIN — CLOTRIMAZOLE AND BETAMETHASONE DIPROPIONATE 1 APPLICATION(S): 10; .5 CREAM TOPICAL at 05:29

## 2023-01-11 RX ADMIN — Medication 40 MILLIGRAM(S): at 05:29

## 2023-01-11 RX ADMIN — Medication 75 MILLIGRAM(S): at 18:08

## 2023-01-11 RX ADMIN — ENOXAPARIN SODIUM 40 MILLIGRAM(S): 100 INJECTION SUBCUTANEOUS at 05:30

## 2023-01-11 RX ADMIN — CLOTRIMAZOLE AND BETAMETHASONE DIPROPIONATE 1 APPLICATION(S): 10; .5 CREAM TOPICAL at 18:07

## 2023-01-11 RX ADMIN — NYSTATIN CREAM 1 APPLICATION(S): 100000 CREAM TOPICAL at 18:07

## 2023-01-11 RX ADMIN — Medication 10 MILLIGRAM(S): at 15:09

## 2023-01-11 RX ADMIN — MONTELUKAST 10 MILLIGRAM(S): 4 TABLET, CHEWABLE ORAL at 11:40

## 2023-01-11 RX ADMIN — Medication 125 MICROGRAM(S): at 05:29

## 2023-01-11 RX ADMIN — GABAPENTIN 300 MILLIGRAM(S): 400 CAPSULE ORAL at 21:45

## 2023-01-11 RX ADMIN — Medication 10 MILLIGRAM(S): at 05:29

## 2023-01-11 RX ADMIN — Medication 81 MILLIGRAM(S): at 11:39

## 2023-01-11 RX ADMIN — TIOTROPIUM BROMIDE 2 PUFF(S): 18 CAPSULE ORAL; RESPIRATORY (INHALATION) at 11:40

## 2023-01-11 RX ADMIN — Medication 5 MILLIGRAM(S): at 05:29

## 2023-01-11 RX ADMIN — OLANZAPINE 20 MILLIGRAM(S): 15 TABLET, FILM COATED ORAL at 21:41

## 2023-01-11 RX ADMIN — Medication 1 APPLICATORFUL: at 21:35

## 2023-01-11 RX ADMIN — Medication 75 MILLIGRAM(S): at 05:28

## 2023-01-11 RX ADMIN — Medication 10 MILLIGRAM(S): at 21:41

## 2023-01-11 RX ADMIN — Medication 20 MILLIGRAM(S): at 11:40

## 2023-01-11 NOTE — PROGRESS NOTE ADULT - ASSESSMENT
_________________________________________________________________________________________  ========>>  M E D I C A L   A T T E N D I N G    F O L L O W  U P  N O T E  <<=========  -----------------------------------------------------------------------------------------------------    - Patient seen and examined by me earlier today.   - In summary,  SHEKHAR VASQUEZ is a 72y year old woman admitted with edema, SOB  - Patient today overall doing ok, comfortable, eating OK. breathing ok      patient this afternoon off O2 >> monitoring ...  RN to give IS..     ==================>> REVIEW OF SYSTEM <<=================    GEN: no fever, no chills, no pain  RESP: occasional SOB, no cough, no sputum  CVS: no chest pain, no palpitations,   GI: no abdominal pain, no nausea,   : no dysuria, no frequency, good urine output   Neuro: no headache, no dizziness  Derm : no itching, no rash    ==================>> PHYSICAL EXAM <<=================    GEN: A&O X 3 , NAD , comfortable, pleasant, calm  in bed   HEENT: NCAT, PERRL, MMM, hearing intact  Neck: supple , no JVD appreciated  CVS: S1S2 , regular , No M/R/G appreciated  PULM: CTA B/L,  limited exam due to body habitus.   ABD.: soft. non tender, non distended,  obese   Extrem: intact pulses , generalized edema >> ACE wrapped   PSYCH : normal mood,  not anxious                         ( Note written / Date of service 01-10-23 )    ==================>> MEDICATIONS <<====================    aspirin enteric coated 81 milliGRAM(s) Oral daily  atorvastatin 40 milliGRAM(s) Oral at bedtime  baclofen 10 milliGRAM(s) Oral every 8 hours  busPIRone 20 milliGRAM(s) Oral <User Schedule>  clotrimazole 1% Vaginal Cream 1 Applicatorful Vaginal at bedtime  clotrimazole/betamethasone Cream 1 Application(s) Topical two times a day  DULoxetine 60 milliGRAM(s) Oral two times a day  enoxaparin Injectable 40 milliGRAM(s) SubCutaneous every 24 hours  furosemide    Tablet 40 milliGRAM(s) Oral daily  gabapentin 300 milliGRAM(s) Oral <User Schedule>  hydrALAZINE 75 milliGRAM(s) Oral two times a day  levothyroxine 125 MICROGram(s) Oral daily  losartan 100 milliGRAM(s) Oral daily  melatonin 3 milliGRAM(s) Oral at bedtime  montelukast 10 milliGRAM(s) Oral daily  nystatin Powder 1 Application(s) Topical two times a day  OLANZapine 20 milliGRAM(s) Oral at bedtime  predniSONE   Tablet 5 milliGRAM(s) Oral daily  tiotropium 2.5 MICROgram(s) Inhaler 2 Puff(s) Inhalation daily    MEDICATIONS  (PRN):  acetaminophen     Tablet .. 650 milliGRAM(s) Oral every 6 hours PRN Temp greater or equal to 38C (100.4F), Mild Pain (1 - 3)  albuterol/ipratropium for Nebulization 3 milliLiter(s) Nebulizer every 6 hours PRN Shortness of Breath and/or Wheezing  loperamide 2 milliGRAM(s) Oral two times a day PRN Diarrhea    ___________  Active diet:  Diet, Regular:   DASH/TLC Sodium & Cholesterol Restricted (DASH)  ___________________    ==================>> VITAL SIGNS <<==================    Vital Signs Last 24 HrsT(C): 36.3 (01-10-23 @ 11:15)  T(F): 97.4 (01-10-23 @ 11:15), Max: 98.2 (01-09-23 @ 19:59)  HR: 103 (01-10-23 @ 11:15) (66 - 103)  BP: 145/74 (01-10-23 @ 11:15)  RR: 18 (01-10-23 @ 11:15) (18 - 20)  SpO2: 90% (01-10-23 @ 11:15) (90% - 98%)       ==================>> LAB AND IMAGING <<==================                        13.0   8.24  )-----------( 147      ( 09 Jan 2023 07:00 )             44.1        Creatinine:  0.72  <<==, 0.71  <<==, 0.83  <<==, 0.76  <<==  Sodium:   143  <==, 142  <==, 142  <==, 140  <==       AST:          14 <== , 12 <==      ALT:        12  <== , 12  <==      AP:        105  <=, 97  <=     Bili:        0.8  <=, 0.7  <=    < from: Limited Transthoracic Echo (01.04.23 @ 13:54) >  Conclusions:  This was a very technically difficult study with poor image  quality. Patient was not positioned optimally and not able  to tolerate a complete echo study.  1. Endocardium not well visualized; unable to evaluate left ventricular systolic function.  2. Right atrium not well visualized.  3. The right ventricle is not well visualized.  4. No pericardial effusion seen.  *** No previous Echo exam.    < end of copied text >  < from: [facility] Duplex Upper Ext Vein Scan, Left (01.05.23 @ 16:55) >  IMPRESSION:  No evidence of left upper extremity deep venous thrombosis.  < end of copied text >    < from: [facility] Duplex Lower Ext Vein Scan, Dorianat (01.05.23 @ 16:54) >  IMPRESSION: Limited study.  No evidence of deep venous thrombosis in either lower extremity in the   visualized venous segments.  The calf veins were not visualized.  < end of copied text >    ___________________________________________________________________________________  ===============>>  A S S E S S M E N T   A N D   P L A N <<===============  ------------------------------------------------------------------------------------------    · Assessment	  72F w/ hx of kidney cancer c/b ?mets (s/p L partial nephrectomy, no chemo/RT), breast cancer in situ, prior DVT (not on AC), COPD (not on home O2), asthma, former smoker, anxiety, IBS with chronic diarrhea, Graves disease, Hypothyroidism, parathyroidectomy, HTN, HLD, NOEMY, obesity, venous insufficiency, presenting with worsening of chronic b/l LE swelling x2 weeks as well as development of LUE swelling x1 month. Associated intermittent SOB and chest pressure/heaviness. Suspect worsening of chronic lymphedema/venous insufficiency in setting of diuretic non-adherence vs less likely CHF vs thyroid-related    Problem/Plan - 1:  ·  Problem: Swelling of extremity.    worsening of chronic lymphedema/venous insufficiency in setting of diuretic non-adherence vs less likely acute exacerbation of chronic diastolic CHF vs possible component of med side effect (amlodipine).    - held amlodipine for now given possible contribution to peripheral edema   - c/w leg elevation, ace bandage wrapping ongoing   - c/w Lasix IV >> PO on DC       monitor volume status, kidney function, and BP   - imaging as above   - TSH ok   serum protein and albumin levels are ok   - patient generally with sedentary life style and likely contributing..   ( patient states she does not walk at home !)         patient educated on diet, activity, weight loss... all questions answered.     Problem/Plan - 2:  ·  Problem: Hypothyroidism.   ·  Plan: Hx of hypothyroidism, Graves disease, and s/p parathyroidectomy.  - c/w home levothyroxine  - TSH ok     Problem/Plan - 3:  ·  Problem: Resting tremor.   ·  Plan: Pt reported development of new resting tremors in b/l UEs over the past month. Unclear etiology, possibly 2/2 extrapyramidal symptoms from olanzapine vs development of Parkinsons vs ?side effect of gabapentin  - outpatient Neurology follow-up.    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   ·  Plan: Hx of kidney cancer   - monitor SCr and electrolytes; avoid nephrotoxins as able given solitary kidney.  - onc f/u as OP    Problem/Plan - 5:  ·  Problem: HTN (hypertension).   ·  Plan: - c/w home losartan and hydralazine  - holding home amlodipine for now given possible side effect of peripheral edema  - monitor BP and consider adding non-CCB antihypertensive med if needed.    Problem/Plan - 6:  ·  Problem: COPD with asthma.   ·  Plan: Hx of COPD/asthma (not on home O2). Pt reported some intermittent SOB but no wheezes appreciated on exam. Denied cough.   - Does not appear to be in COPD/asthma exacerbation at this time   - c/w home montelukast, prednisone, and Spiriva inhaler  - c/w duonebs q6h PRN  - wean off supplemental O2 as tolerated, target O2 sat of 88-92% in setting of COPD hx.  i- incentive spirometer     Problem/Plan - 7:  ·  Problem: Anxiety and depression.   ·  Plan: - c/w home buspirone, olanzapine, and duloxetine.    Problem/Plan - 8:  ·  Problem: Prophylactic measure.   ·  Plan: - DVT ppx: Lovenox  - Diet: DASH    - Dispo: home  tomorrow if remains stable ( need to have HHA set up  / resumed)       --------------------------------------------  Case discussed with patient, NP, cardio.   Education given on findings and plan of care  ___________________________  H. JANICE Welch.  Pager: [phone]       _________________________________________________________________________________________  ========>>  M E D I C A L   A T T E N D I N G    F O L L O W  U P  N O T E  <<=========  -----------------------------------------------------------------------------------------------------    - Patient seen and examined by me earlier today.   - In summary,  SHEKHAR VASQUEZ is a 72y year old woman admitted with edema, SOB  - Patient today overall doing ok, comfortable, eating OK. breathing ok      having a hard time coming off O2 >> trying to get home O2 ( patient has had O2 in the past, for COPD). patient and  agreeable with plan     ==================>> REVIEW OF SYSTEM <<=================    GEN: no fever, no chills, no pain  RESP: occasional SOB, no cough, no sputum  CVS: no chest pain, no palpitations,   GI: no abdominal pain, no nausea,   : no dysuria, no frequency, good urine output   Neuro: no headache, no dizziness  Derm : no itching, no rash    ==================>> PHYSICAL EXAM <<=================    GEN: A&O X 3 , NAD , comfortable, pleasant, calm  in bed   HEENT: NCAT, PERRL, MMM, hearing intact  Neck: supple , no JVD appreciated  CVS: S1S2 , regular , No M/R/G appreciated  PULM: CTA B/L,  limited exam due to body habitus.   ABD.: soft. non tender, non distended,  obese   Extrem: intact pulses , generalized edema >> ACE wrapped   PSYCH : normal mood,  not anxious                             ( Note written / Date of service 01-11-23 )    ==================>> MEDICATIONS <<====================    aspirin enteric coated 81 milliGRAM(s) Oral daily  atorvastatin 40 milliGRAM(s) Oral at bedtime  baclofen 10 milliGRAM(s) Oral every 8 hours  busPIRone 20 milliGRAM(s) Oral <User Schedule>  clotrimazole 1% Vaginal Cream 1 Applicatorful Vaginal at bedtime  clotrimazole/betamethasone Cream 1 Application(s) Topical two times a day  DULoxetine 60 milliGRAM(s) Oral two times a day  enoxaparin Injectable 40 milliGRAM(s) SubCutaneous every 24 hours  furosemide    Tablet 40 milliGRAM(s) Oral daily  gabapentin 300 milliGRAM(s) Oral <User Schedule>  hydrALAZINE 75 milliGRAM(s) Oral two times a day  levothyroxine 125 MICROGram(s) Oral daily  losartan 100 milliGRAM(s) Oral daily  melatonin 3 milliGRAM(s) Oral at bedtime  montelukast 10 milliGRAM(s) Oral daily  nystatin Powder 1 Application(s) Topical two times a day  OLANZapine 20 milliGRAM(s) Oral at bedtime  predniSONE   Tablet 5 milliGRAM(s) Oral daily  tiotropium 2.5 MICROgram(s) Inhaler 2 Puff(s) Inhalation daily    MEDICATIONS  (PRN):  acetaminophen     Tablet .. 650 milliGRAM(s) Oral every 6 hours PRN Temp greater or equal to 38C (100.4F), Mild Pain (1 - 3)  albuterol/ipratropium for Nebulization 3 milliLiter(s) Nebulizer every 6 hours PRN Shortness of Breath and/or Wheezing  loperamide 2 milliGRAM(s) Oral two times a day PRN Diarrhea    ___________  Active diet:  Diet, Regular:   DASH/TLC Sodium & Cholesterol Restricted (DASH)  ___________________    ==================>> VITAL SIGNS <<==================    Vital Signs Last 24 HrsT(C): 36.3 (01-11-23 @ 12:56)  T(F): 97.3 (01-11-23 @ 12:56), Max: 97.9 (01-10-23 @ 19:55)  HR: 90 (01-11-23 @ 12:56) (70 - 90)  BP: 119/68 (01-11-23 @ 12:56)  RR: 18 (01-11-23 @ 12:56) (18 - 20)  SpO2: 92% (01-11-23 @ 12:56) (83% - 93%)       ==================>> LAB AND IMAGING <<==================       no labs today     Creatinine:  0.72  <<==, 0.71  <<==, 0.83  <<==  Sodium:   143  <==, 142  <==, 142  <==    ____________________________    M I C R O B I O L O G Y :    Culture - Stool (collected 05 Jan 2023 10:57)  Source: .Stool Feces  Final Report (07 Jan 2023 17:07):    No enteric pathogens isolated.    (Stool culture examined for Salmonella,    Shigella, Campylobacter, Aeromonas, Plesiomonas,    Vibrio, E.coli O157 and Yersinia)      < from: Limited Transthoracic Echo (01.04.23 @ 13:54) >  Conclusions:  This was a very technically difficult study with poor image  quality. Patient was not positioned optimally and not able  to tolerate a complete echo study.  1. Endocardium not well visualized; unable to evaluate left ventricular systolic function.  2. Right atrium not well visualized.  3. The right ventricle is not well visualized.  4. No pericardial effusion seen.  *** No previous Echo exam.    < end of copied text >  < from: [facility] Duplex Upper Ext Vein Scan, Left (01.05.23 @ 16:55) >  IMPRESSION:  No evidence of left upper extremity deep venous thrombosis.  < end of copied text >    < from: [facility] Duplex Lower Ext Vein Scan, Bilat (01.05.23 @ 16:54) >  IMPRESSION: Limited study.  No evidence of deep venous thrombosis in either lower extremity in the   visualized venous segments.  The calf veins were not visualized.  < end of copied text >    ___________________________________________________________________________________  ===============>>  A S S E S S M E N T   A N D   P L A N <<===============  ------------------------------------------------------------------------------------------    · Assessment	  72F w/ hx of kidney cancer c/b ?mets (s/p L partial nephrectomy, no chemo/RT), breast cancer in situ, prior DVT (not on AC), COPD (not on home O2), asthma, former smoker, anxiety, IBS with chronic diarrhea, Graves disease, Hypothyroidism, parathyroidectomy, HTN, HLD, NOEMY, obesity, venous insufficiency, presenting with worsening of chronic b/l LE swelling x2 weeks as well as development of LUE swelling x1 month. Associated intermittent SOB and chest pressure/heaviness. Suspect worsening of chronic lymphedema/venous insufficiency in setting of diuretic non-adherence vs less likely CHF vs thyroid-related    Problem/Plan - 1:  ·  Problem: Swelling of extremity.    worsening of chronic lymphedema/venous insufficiency in setting of diuretic non-adherence vs less likely acute exacerbation of chronic diastolic CHF vs possible component of med side effect (amlodipine).    - held amlodipine for now given possible contribution to peripheral edema   - c/w leg elevation, ace bandage wrapping ongoing   - c/w Lasix IV >> PO on DC       monitor volume status, kidney function, and BP   - imaging as above   - TSH ok   serum protein and albumin levels are ok   - patient generally with sedentary life style and likely contributing..   ( patient states she does not walk at home !)         patient educated on diet, activity, weight loss... all questions answered.     Problem/Plan - 2:  ·  Problem: Hypothyroidism.   ·  Plan: Hx of hypothyroidism, Graves disease, and s/p parathyroidectomy.  - c/w home levothyroxine  - TSH ok     Problem/Plan - 3:  ·  Problem: Resting tremor.   ·  Plan: Pt reported development of new resting tremors in b/l UEs over the past month. Unclear etiology, possibly 2/2 extrapyramidal symptoms from olanzapine vs development of Parkinsons vs ?side effect of gabapentin  - outpatient Neurology follow-up.    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   ·  Plan: Hx of kidney cancer   - monitor SCr and electrolytes; avoid nephrotoxins as able given solitary kidney.  - onc f/u as OP    Problem/Plan - 5:  ·  Problem: HTN (hypertension).   ·  Plan: - c/w home losartan and hydralazine  - holding home amlodipine for now given possible side effect of peripheral edema  - monitor BP and consider adding non-CCB antihypertensive med if needed.    Problem/Plan - 6:  ·  Problem: COPD with asthma.   ·  Plan: Hx of COPD/asthma (not on home O2). Pt reported some intermittent SOB but no wheezes appreciated on exam. Denied cough.   - Does not appear to be in COPD/asthma exacerbation at this time   - c/w home montelukast, prednisone, and Spiriva inhaler  - c/w duonebs q6h PRN  - wean off supplemental O2 as tolerated, target O2 sat of 88-92% in setting of COPD hx.>> Home O2 being arranged   i- incentive spirometer     Problem/Plan - 7:  ·  Problem: Anxiety and depression.   ·  Plan: - c/w home buspirone, olanzapine, and duloxetine.    Problem/Plan - 8:  ·  Problem: Prophylactic measure.   ·  Plan: - DVT ppx: Lovenox  - Diet: DASH    - Dispo: home with O2       --------------------------------------------  Case discussed with patient, , NP  Education given on findings and plan of care  ___________________________  HRenetta Welch D.O.  Pager: [phone]

## 2023-01-11 NOTE — DISCHARGE NOTE NURSING/CASE MANAGEMENT/SOCIAL WORK - NSSCCARECORD_GEN_ALL_CORE
January 11, 2023     Patient: Ninoska Ruby  YOB: 2015  Date of Visit: 1/11/2023      To Whom it May Concern:    Diana Garcia is under my professional care  Ramiro Sung was seen in my office on 1/11/2023  Ramiro Sung may return to school on 1/12/2023  If you have any questions or concerns, please don't hesitate to call           Sincerely,          Fran Nicole PA-C        CC: No Recipients [facility]

## 2023-01-12 ENCOUNTER — TRANSCRIPTION ENCOUNTER (OUTPATIENT)
Age: 73
End: 2023-01-12

## 2023-01-12 VITALS
TEMPERATURE: 98 F | RESPIRATION RATE: 17 BRPM | SYSTOLIC BLOOD PRESSURE: 112 MMHG | OXYGEN SATURATION: 95 % | DIASTOLIC BLOOD PRESSURE: 62 MMHG | HEART RATE: 75 BPM

## 2023-01-12 PROCEDURE — 94640 AIRWAY INHALATION TREATMENT: CPT

## 2023-01-12 PROCEDURE — 96374 THER/PROPH/DIAG INJ IV PUSH: CPT

## 2023-01-12 PROCEDURE — 85025 COMPLETE CBC W/AUTO DIFF WBC: CPT

## 2023-01-12 PROCEDURE — 82435 ASSAY OF BLOOD CHLORIDE: CPT

## 2023-01-12 PROCEDURE — 93308 TTE F-UP OR LMTD: CPT

## 2023-01-12 PROCEDURE — 82330 ASSAY OF CALCIUM: CPT

## 2023-01-12 PROCEDURE — 84100 ASSAY OF PHOSPHORUS: CPT

## 2023-01-12 PROCEDURE — 93971 EXTREMITY STUDY: CPT

## 2023-01-12 PROCEDURE — 82947 ASSAY GLUCOSE BLOOD QUANT: CPT

## 2023-01-12 PROCEDURE — 80053 COMPREHEN METABOLIC PANEL: CPT

## 2023-01-12 PROCEDURE — 71045 X-RAY EXAM CHEST 1 VIEW: CPT

## 2023-01-12 PROCEDURE — 82962 GLUCOSE BLOOD TEST: CPT

## 2023-01-12 PROCEDURE — 85027 COMPLETE CBC AUTOMATED: CPT

## 2023-01-12 PROCEDURE — 84443 ASSAY THYROID STIM HORMONE: CPT

## 2023-01-12 PROCEDURE — 84132 ASSAY OF SERUM POTASSIUM: CPT

## 2023-01-12 PROCEDURE — 85014 HEMATOCRIT: CPT

## 2023-01-12 PROCEDURE — 83735 ASSAY OF MAGNESIUM: CPT

## 2023-01-12 PROCEDURE — 87637 SARSCOV2&INF A&B&RSV AMP PRB: CPT

## 2023-01-12 PROCEDURE — 87507 IADNA-DNA/RNA PROBE TQ 12-25: CPT

## 2023-01-12 PROCEDURE — 84295 ASSAY OF SERUM SODIUM: CPT

## 2023-01-12 PROCEDURE — 87045 FECES CULTURE AEROBIC BACT: CPT

## 2023-01-12 PROCEDURE — 85018 HEMOGLOBIN: CPT

## 2023-01-12 PROCEDURE — 93970 EXTREMITY STUDY: CPT

## 2023-01-12 PROCEDURE — 84484 ASSAY OF TROPONIN QUANT: CPT

## 2023-01-12 PROCEDURE — 83605 ASSAY OF LACTIC ACID: CPT

## 2023-01-12 PROCEDURE — 84439 ASSAY OF FREE THYROXINE: CPT

## 2023-01-12 PROCEDURE — 80048 BASIC METABOLIC PNL TOTAL CA: CPT

## 2023-01-12 PROCEDURE — 97602 WOUND(S) CARE NON-SELECTIVE: CPT

## 2023-01-12 PROCEDURE — 82803 BLOOD GASES ANY COMBINATION: CPT

## 2023-01-12 PROCEDURE — 36415 COLL VENOUS BLD VENIPUNCTURE: CPT

## 2023-01-12 PROCEDURE — 87177 OVA AND PARASITES SMEARS: CPT

## 2023-01-12 PROCEDURE — 93321 DOPPLER ECHO F-UP/LMTD STD: CPT

## 2023-01-12 PROCEDURE — 87077 CULTURE AEROBIC IDENTIFY: CPT

## 2023-01-12 PROCEDURE — 87046 STOOL CULTR AEROBIC BACT EA: CPT

## 2023-01-12 PROCEDURE — 97161 PT EVAL LOW COMPLEX 20 MIN: CPT

## 2023-01-12 PROCEDURE — 99285 EMERGENCY DEPT VISIT HI MDM: CPT

## 2023-01-12 PROCEDURE — 83880 ASSAY OF NATRIURETIC PEPTIDE: CPT

## 2023-01-12 RX ORDER — FUROSEMIDE 40 MG
1 TABLET ORAL
Qty: 0 | Refills: 0 | DISCHARGE

## 2023-01-12 RX ORDER — NYSTATIN CREAM 100000 [USP'U]/G
1 CREAM TOPICAL
Qty: 30 | Refills: 0
Start: 2023-01-12 | End: 2023-02-10

## 2023-01-12 RX ORDER — FUROSEMIDE 40 MG
1 TABLET ORAL
Qty: 30 | Refills: 0
Start: 2023-01-12 | End: 2023-02-10

## 2023-01-12 RX ORDER — CLOTRIMAZOLE AND BETAMETHASONE DIPROPIONATE 10; .5 MG/G; MG/G
1 CREAM TOPICAL
Qty: 30 | Refills: 0
Start: 2023-01-12 | End: 2023-02-10

## 2023-01-12 RX ORDER — AMLODIPINE BESYLATE 2.5 MG/1
1 TABLET ORAL
Qty: 0 | Refills: 0 | DISCHARGE

## 2023-01-12 RX ADMIN — Medication 20 MILLIGRAM(S): at 11:40

## 2023-01-12 RX ADMIN — NYSTATIN CREAM 1 APPLICATION(S): 100000 CREAM TOPICAL at 06:02

## 2023-01-12 RX ADMIN — GABAPENTIN 300 MILLIGRAM(S): 400 CAPSULE ORAL at 11:39

## 2023-01-12 RX ADMIN — Medication 81 MILLIGRAM(S): at 11:38

## 2023-01-12 RX ADMIN — CLOTRIMAZOLE AND BETAMETHASONE DIPROPIONATE 1 APPLICATION(S): 10; .5 CREAM TOPICAL at 06:03

## 2023-01-12 RX ADMIN — Medication 5 MILLIGRAM(S): at 06:01

## 2023-01-12 RX ADMIN — DULOXETINE HYDROCHLORIDE 60 MILLIGRAM(S): 30 CAPSULE, DELAYED RELEASE ORAL at 06:00

## 2023-01-12 RX ADMIN — TIOTROPIUM BROMIDE 2 PUFF(S): 18 CAPSULE ORAL; RESPIRATORY (INHALATION) at 11:38

## 2023-01-12 RX ADMIN — Medication 10 MILLIGRAM(S): at 13:12

## 2023-01-12 RX ADMIN — Medication 125 MICROGRAM(S): at 06:01

## 2023-01-12 RX ADMIN — ENOXAPARIN SODIUM 40 MILLIGRAM(S): 100 INJECTION SUBCUTANEOUS at 06:02

## 2023-01-12 RX ADMIN — Medication 75 MILLIGRAM(S): at 06:01

## 2023-01-12 RX ADMIN — Medication 40 MILLIGRAM(S): at 06:04

## 2023-01-12 RX ADMIN — LOSARTAN POTASSIUM 100 MILLIGRAM(S): 100 TABLET, FILM COATED ORAL at 06:01

## 2023-01-12 RX ADMIN — MONTELUKAST 10 MILLIGRAM(S): 4 TABLET, CHEWABLE ORAL at 11:38

## 2023-01-12 RX ADMIN — Medication 10 MILLIGRAM(S): at 06:01

## 2023-01-12 NOTE — DISCHARGE NOTE NURSING/CASE MANAGEMENT/SOCIAL WORK - NSDCVIVACCINE_GEN_ALL_CORE_FT
Tdap; 19-Nov-2019 16:31; Christine Skaggs (RN); [employer]; [id number] (Exp. Date: 17-Sep-2021); IntraMuscular; Deltoid Left.; 0.5 milliLiter(s); VIS (VIS Published: 09-May-2013, VIS Presented: 19-Nov-2019);

## 2023-01-12 NOTE — PROGRESS NOTE ADULT - ASSESSMENT
M E D I C A L   A T T E N D I N G    F O L L O W    U P   N O T E  (01-12-23 )                                     ------------------------------------------------------------------------------------------------    patient evaluated by me, case discussed with team, chart, medications, and physical exam reviewed, labs / tests  and vitals reviewed by me, as bellow.   Patient is stable for discharge today. patient has portable O2 concentrator at bedside   Patient to follow up with  PMD, Cardio..   See discharge document for full note.  [Greater than 35 min spent for these services. ]                             ( note written / Date of service  01-12-23 )    ==================>> MEDICATIONS <<====================    aspirin enteric coated 81 milliGRAM(s) Oral daily  atorvastatin 40 milliGRAM(s) Oral at bedtime  baclofen 10 milliGRAM(s) Oral every 8 hours  busPIRone 20 milliGRAM(s) Oral <User Schedule>  clotrimazole/betamethasone Cream 1 Application(s) Topical two times a day  DULoxetine 60 milliGRAM(s) Oral two times a day  enoxaparin Injectable 40 milliGRAM(s) SubCutaneous every 24 hours  furosemide    Tablet 40 milliGRAM(s) Oral daily  gabapentin 300 milliGRAM(s) Oral <User Schedule>  hydrALAZINE 75 milliGRAM(s) Oral two times a day  levothyroxine 125 MICROGram(s) Oral daily  losartan 100 milliGRAM(s) Oral daily  melatonin 3 milliGRAM(s) Oral at bedtime  montelukast 10 milliGRAM(s) Oral daily  nystatin Powder 1 Application(s) Topical two times a day  OLANZapine 20 milliGRAM(s) Oral at bedtime  predniSONE   Tablet 5 milliGRAM(s) Oral daily  tiotropium 2.5 MICROgram(s) Inhaler 2 Puff(s) Inhalation daily    MEDICATIONS  (PRN):  acetaminophen     Tablet .. 650 milliGRAM(s) Oral every 6 hours PRN Temp greater or equal to 38C (100.4F), Mild Pain (1 - 3)  albuterol/ipratropium for Nebulization 3 milliLiter(s) Nebulizer every 6 hours PRN Shortness of Breath and/or Wheezing  loperamide 2 milliGRAM(s) Oral two times a day PRN Diarrhea    ___________  Active diet:  Diet, Regular:   DASH/TLC Sodium & Cholesterol Restricted (DASH)  ___________________    ==================>> VITAL SIGNS <<==================    T(C): 36.4 (01-12-23 @ 13:31), Max: 37.1 (01-12-23 @ 04:18)  HR: 75 (01-12-23 @ 13:31) (70 - 81)  BP: 112/62 (01-12-23 @ 13:31) (96/52 - 120/71)  BP(mean): 67 (01-11-23 @ 20:52)  RR: 17 (01-12-23 @ 13:31) (17 - 18)  SpO2: 95% (01-12-23 @ 13:31) (93% - 95%)       I&O's Summary    11 Jan 2023 07:01  -  12 Jan 2023 07:00  --------------------------------------------------------  IN: 425 mL / OUT: 1250 mL / NET: -825 mL    12 Jan 2023 07:01  -  12 Jan 2023 14:12  --------------------------------------------------------  IN: 140 mL / OUT: 0 mL / NET: 140 mL       ==================>> LAB AND IMAGING <<==================           no labs today

## 2023-01-12 NOTE — PROGRESS NOTE ADULT - SUBJECTIVE AND OBJECTIVE BOX
Cardiovascular Disease Progress Note    Overnight events: No acute events overnight.  denies any cp/sob/palps  Otherwise review of systems negative    Objective Findings:  T(C): 36.6 (23 @ 04:45), Max: 36.6 (01-10-23 @ 19:55)  HR: 70 (23 @ 04:45) (70 - 103)  BP: 156/84 (23 @ 04:45) (145/74 - 156/84)  RR: 20 (23 @ 04:45) (18 - 20)  SpO2: 93% (23 @ 04:45) (83% - 93%)  Wt(kg): --  Daily     Daily Weight in k (10 Alan 2023 11:37)      Physical Exam:  Gen: NAD  HEENT: EOMI  CV: RRR, normal S1 + S2, no m/r/g  Lungs: CTAB  Abd: soft, non-tender  Ext: No edema    Telemetry:    Laboratory Data:                    Inpatient Medications:  MEDICATIONS  (STANDING):  aspirin enteric coated 81 milliGRAM(s) Oral daily  atorvastatin 40 milliGRAM(s) Oral at bedtime  baclofen 10 milliGRAM(s) Oral every 8 hours  busPIRone 20 milliGRAM(s) Oral <User Schedule>  clotrimazole 1% Vaginal Cream 1 Applicatorful Vaginal at bedtime  clotrimazole/betamethasone Cream 1 Application(s) Topical two times a day  DULoxetine 60 milliGRAM(s) Oral two times a day  enoxaparin Injectable 40 milliGRAM(s) SubCutaneous every 24 hours  furosemide    Tablet 40 milliGRAM(s) Oral daily  gabapentin 300 milliGRAM(s) Oral <User Schedule>  hydrALAZINE 75 milliGRAM(s) Oral two times a day  levothyroxine 125 MICROGram(s) Oral daily  losartan 100 milliGRAM(s) Oral daily  melatonin 3 milliGRAM(s) Oral at bedtime  montelukast 10 milliGRAM(s) Oral daily  nystatin Powder 1 Application(s) Topical two times a day  OLANZapine 20 milliGRAM(s) Oral at bedtime  predniSONE   Tablet 5 milliGRAM(s) Oral daily  tiotropium 2.5 MICROgram(s) Inhaler 2 Puff(s) Inhalation daily      Assessment:  72F w/ hx of kidney cancer c/b ?mets (s/p L partial nephrectomy, no chemo/RT), breast cancer in situ, prior DVT (not on AC), COPD (not on home O2), asthma, former smoker, anxiety, IBS with chronic diarrhea, Graves disease, Hypothyroidism, parathyroidectomy, HTN, HLD, NOEMY, obesity, venous insufficiency, prior COVID-19 infection (2021), presenting with worsening of chronic b/l LE swelling x2 weeks as well as development of LUE swelling x1 month.    Recs:  cardiac stable  generalized edema, likely secondary to body habitus, sedentary state and venous insufficiency, possible component of acute on chronic diastolic heart failure  cw diuretics with close monitoring of renal fxn  TTE limited and nondiagnositc. normal BNP argues against ADHF  venous doppler of lower ext and LUE  neg for DVT  f/u wound care  keeps legs elevated and wrapped as able  dcp if o2 sat requirements stable      Over 25 minutes spent on total encounter; more than 50% of the visit was spent counseling and/or coordinating care by the attending physician.      Juan Salcedo MD   Cardiovascular Disease  [phone]
Cardiovascular Disease Progress Note    Overnight events: No acute events overnight. no cp/sob/palps   Otherwise review of systems negative    Objective Findings:  T(C): 36.7 (23 @ 06:25), Max: 37.3 (23 @ 20:55)  HR: 70 (23 @ 06:25) (70 - 85)  BP: 157/81 (23 @ 06:25) (121/67 - 157/81)  RR: 16 (23 @ 06:25) (16 - 18)  SpO2: 97% (23 @ 06:25) (95% - 97%)  Wt(kg): --  Daily     Daily Weight in k.1 (2023 05:47)      Physical Exam:  Gen: NAD  HEENT: EOMI  CV: RRR, normal S1 + S2, no m/r/g  Lungs: CTAB  Abd: soft, non-tender  Ext: No edema    Telemetry:    Laboratory Data:        142  |  101  |  12  ----------------------------<  109<H>  4.1   |  32<H>  |  0.71    Ca    9.7      2023 05:39  Mg     2.0                     Inpatient Medications:  MEDICATIONS  (STANDING):  aspirin enteric coated 81 milliGRAM(s) Oral daily  atorvastatin 40 milliGRAM(s) Oral at bedtime  baclofen 10 milliGRAM(s) Oral every 8 hours  busPIRone 20 milliGRAM(s) Oral <User Schedule>  clotrimazole 1% Vaginal Cream 1 Applicatorful Vaginal at bedtime  clotrimazole/betamethasone Cream 1 Application(s) Topical two times a day  DULoxetine 60 milliGRAM(s) Oral two times a day  enoxaparin Injectable 40 milliGRAM(s) SubCutaneous every 24 hours  furosemide   Injectable 40 milliGRAM(s) IV Push daily  gabapentin 300 milliGRAM(s) Oral <User Schedule>  hydrALAZINE 75 milliGRAM(s) Oral two times a day  levothyroxine 125 MICROGram(s) Oral daily  losartan 100 milliGRAM(s) Oral daily  melatonin 3 milliGRAM(s) Oral at bedtime  montelukast 10 milliGRAM(s) Oral daily  nystatin Powder 1 Application(s) Topical two times a day  OLANZapine 20 milliGRAM(s) Oral at bedtime  predniSONE   Tablet 5 milliGRAM(s) Oral daily  tiotropium 2.5 MICROgram(s) Inhaler 2 Puff(s) Inhalation daily      Assessment:  72F w/ hx of kidney cancer c/b ?mets (s/p L partial nephrectomy, no chemo/RT), breast cancer in situ, prior DVT (not on AC), COPD (not on home O2), asthma, former smoker, anxiety, IBS with chronic diarrhea, Graves disease, Hypothyroidism, parathyroidectomy, HTN, HLD, NOEMY, obesity, venous insufficiency, prior COVID-19 infection (2021), presenting with worsening of chronic b/l LE swelling x2 weeks as well as development of LUE swelling x1 month.    Recs:  cardiac stable  generalized edema, likely secondary to body habitus, sedentary state and venous insufficiency, possible component of acute on chronic diastolic heart failure  cw diuretics with close monitoring of renal fxn  TTE limited and nondiagnositc. normal BNP argues against ADHF  venous doppler of lower ext and LUE  neg for DVT  f/u wound care  keeps legs elevated and wrapped  dcp        Over 25 minutes spent on total encounter; more than 50% of the visit was spent counseling and/or coordinating care by the attending physician.      Juan Salcedo MD   Cardiovascular Disease  [phone]
Cardiovascular Disease Progress Note    Overnight events: No acute events overnight.  no cp/sob/palps  Otherwise review of systems negative    Objective Findings:  T(C): 36.7 (01-10-23 @ 06:20), Max: 36.8 (01-09-23 @ 19:59)  HR: 79 (01-10-23 @ 06:20) (66 - 88)  BP: 140/75 (01-10-23 @ 06:20) (119/67 - 157/71)  RR: 18 (01-10-23 @ 06:20) (18 - 20)  SpO2: 94% (01-10-23 @ 06:20) (90% - 98%)  Wt(kg): --  Daily     Daily       Physical Exam:  Gen: NAD  HEENT: EOMI  CV: RRR, normal S1 + S2, no m/r/g  Lungs: CTAB  Abd: soft, non-tender  Ext: No edema    Telemetry:    Laboratory Data:                        13.0   8.24  )-----------( 147      ( 09 Jan 2023 07:00 )             44.1                     Inpatient Medications:  MEDICATIONS  (STANDING):  aspirin enteric coated 81 milliGRAM(s) Oral daily  atorvastatin 40 milliGRAM(s) Oral at bedtime  baclofen 10 milliGRAM(s) Oral every 8 hours  busPIRone 20 milliGRAM(s) Oral <User Schedule>  clotrimazole 1% Vaginal Cream 1 Applicatorful Vaginal at bedtime  clotrimazole/betamethasone Cream 1 Application(s) Topical two times a day  DULoxetine 60 milliGRAM(s) Oral two times a day  enoxaparin Injectable 40 milliGRAM(s) SubCutaneous every 24 hours  furosemide    Tablet 40 milliGRAM(s) Oral daily  gabapentin 300 milliGRAM(s) Oral <User Schedule>  hydrALAZINE 75 milliGRAM(s) Oral two times a day  levothyroxine 125 MICROGram(s) Oral daily  losartan 100 milliGRAM(s) Oral daily  melatonin 3 milliGRAM(s) Oral at bedtime  montelukast 10 milliGRAM(s) Oral daily  nystatin Powder 1 Application(s) Topical two times a day  OLANZapine 20 milliGRAM(s) Oral at bedtime  predniSONE   Tablet 5 milliGRAM(s) Oral daily  tiotropium 2.5 MICROgram(s) Inhaler 2 Puff(s) Inhalation daily      Assessment:  72F w/ hx of kidney cancer c/b ?mets (s/p L partial nephrectomy, no chemo/RT), breast cancer in situ, prior DVT (not on AC), COPD (not on home O2), asthma, former smoker, anxiety, IBS with chronic diarrhea, Graves disease, Hypothyroidism, parathyroidectomy, HTN, HLD, NOEMY, obesity, venous insufficiency, prior COVID-19 infection (Jan 2021), presenting with worsening of chronic b/l LE swelling x2 weeks as well as development of LUE swelling x1 month.    Recs:  cardiac stable  generalized edema, likely secondary to body habitus, sedentary state and venous insufficiency, possible component of acute on chronic diastolic heart failure  cw diuretics with close monitoring of renal fxn  TTE limited and nondiagnositc. normal BNP argues against ADHF  venous doppler of lower ext and LUE  neg for DVT  f/u wound care  keeps legs elevated and wrapped  dcp if o2 sat requirements stable        Over 25 minutes spent on total encounter; more than 50% of the visit was spent counseling and/or coordinating care by the attending physician.      Juan Salcedo MD   Cardiovascular Disease  [phone]
Cardiovascular Disease Progress Note    Overnight events: No acute events overnight.  no cp/sob/palps  Otherwise review of systems negative    Objective Findings:  T(C): 37.1 (01-12-23 @ 04:18), Max: 37.1 (01-12-23 @ 04:18)  HR: 70 (01-12-23 @ 04:18) (70 - 90)  BP: 120/71 (01-12-23 @ 04:18) (96/52 - 120/71)  RR: 18 (01-12-23 @ 04:18) (18 - 18)  SpO2: 95% (01-12-23 @ 04:18) (92% - 95%)  Wt(kg): --  Daily     Daily       Physical Exam:  Gen: NAD  HEENT: EOMI  CV: RRR, normal S1 + S2, no m/r/g  Lungs: CTAB  Abd: soft, non-tender  Ext: No edema    Telemetry:    Laboratory Data:                    Inpatient Medications:  MEDICATIONS  (STANDING):  aspirin enteric coated 81 milliGRAM(s) Oral daily  atorvastatin 40 milliGRAM(s) Oral at bedtime  baclofen 10 milliGRAM(s) Oral every 8 hours  busPIRone 20 milliGRAM(s) Oral <User Schedule>  clotrimazole/betamethasone Cream 1 Application(s) Topical two times a day  DULoxetine 60 milliGRAM(s) Oral two times a day  enoxaparin Injectable 40 milliGRAM(s) SubCutaneous every 24 hours  furosemide    Tablet 40 milliGRAM(s) Oral daily  gabapentin 300 milliGRAM(s) Oral <User Schedule>  hydrALAZINE 75 milliGRAM(s) Oral two times a day  levothyroxine 125 MICROGram(s) Oral daily  losartan 100 milliGRAM(s) Oral daily  melatonin 3 milliGRAM(s) Oral at bedtime  montelukast 10 milliGRAM(s) Oral daily  nystatin Powder 1 Application(s) Topical two times a day  OLANZapine 20 milliGRAM(s) Oral at bedtime  predniSONE   Tablet 5 milliGRAM(s) Oral daily  tiotropium 2.5 MICROgram(s) Inhaler 2 Puff(s) Inhalation daily      Assessment:  72F w/ hx of kidney cancer c/b ?mets (s/p L partial nephrectomy, no chemo/RT), breast cancer in situ, prior DVT (not on AC), COPD (not on home O2), asthma, former smoker, anxiety, IBS with chronic diarrhea, Graves disease, Hypothyroidism, parathyroidectomy, HTN, HLD, NOEMY, obesity, venous insufficiency, prior COVID-19 infection (Jan 2021), presenting with worsening of chronic b/l LE swelling x2 weeks as well as development of LUE swelling x1 month.    Recs:  cardiac stable  generalized edema, likely secondary to body habitus, sedentary state and venous insufficiency, possible component of acute on chronic diastolic heart failure  cw diuretics with close monitoring of renal fxn  TTE limited and nondiagnositc. normal BNP argues against ADHF  venous doppler of lower ext and LUE  neg for DVT  f/u wound care  keeps legs elevated and wrapped as able  dcp with home o2          Over 25 minutes spent on total encounter; more than 50% of the visit was spent counseling and/or coordinating care by the attending physician.      Juan Salcedo MD   Cardiovascular Disease  [phone]
Cardiovascular Disease Progress Note    Overnight events: No acute events overnight.  no cp/sob/palps. edema improving  Otherwise review of systems negative    Objective Findings:  T(C): 36.4 (01-09-23 @ 06:22), Max: 36.4 (01-09-23 @ 06:22)  HR: 64 (01-09-23 @ 06:22) (64 - 81)  BP: 167/71 (01-09-23 @ 06:22) (148/75 - 167/71)  RR: 18 (01-09-23 @ 06:22) (18 - 18)  SpO2: 99% (01-09-23 @ 06:22) (97% - 99%)  Wt(kg): --  Daily     Daily       Physical Exam:  Gen: NAD  HEENT: EOMI  CV: RRR, normal S1 + S2, no m/r/g  Lungs: CTAB  Abd: soft, non-tender  Ext: mild edema    Telemetry:    Laboratory Data:                        13.0   8.24  )-----------( 147      ( 09 Jan 2023 07:00 )             44.1     01-08    143  |  100  |  12  ----------------------------<  109<H>  3.8   |  35<H>  |  0.72    Ca    10.2      08 Jan 2023 06:15                Inpatient Medications:  MEDICATIONS  (STANDING):  aspirin enteric coated 81 milliGRAM(s) Oral daily  atorvastatin 40 milliGRAM(s) Oral at bedtime  baclofen 10 milliGRAM(s) Oral every 8 hours  busPIRone 20 milliGRAM(s) Oral <User Schedule>  clotrimazole 1% Vaginal Cream 1 Applicatorful Vaginal at bedtime  clotrimazole/betamethasone Cream 1 Application(s) Topical two times a day  DULoxetine 60 milliGRAM(s) Oral two times a day  enoxaparin Injectable 40 milliGRAM(s) SubCutaneous every 24 hours  furosemide    Tablet 40 milliGRAM(s) Oral daily  gabapentin 300 milliGRAM(s) Oral <User Schedule>  hydrALAZINE 75 milliGRAM(s) Oral two times a day  levothyroxine 125 MICROGram(s) Oral daily  losartan 100 milliGRAM(s) Oral daily  melatonin 3 milliGRAM(s) Oral at bedtime  montelukast 10 milliGRAM(s) Oral daily  nystatin Powder 1 Application(s) Topical two times a day  OLANZapine 20 milliGRAM(s) Oral at bedtime  predniSONE   Tablet 5 milliGRAM(s) Oral daily  tiotropium 2.5 MICROgram(s) Inhaler 2 Puff(s) Inhalation daily      Assessment:  72F w/ hx of kidney cancer c/b ?mets (s/p L partial nephrectomy, no chemo/RT), breast cancer in situ, prior DVT (not on AC), COPD (not on home O2), asthma, former smoker, anxiety, IBS with chronic diarrhea, Graves disease, Hypothyroidism, parathyroidectomy, HTN, HLD, NOEMY, obesity, venous insufficiency, prior COVID-19 infection (Jan 2021), presenting with worsening of chronic b/l LE swelling x2 weeks as well as development of LUE swelling x1 month.    Recs:  cardiac stable  generalized edema, likely secondary to body habitus, sedentary state and venous insufficiency, possible component of acute on chronic diastolic heart failure  cw diuretics with close monitoring of renal fxn  TTE limited and nondiagnositc. normal BNP argues against ADHF  venous doppler of lower ext and LUE  neg for DVT  f/u wound care  keeps legs elevated and wrapped  dcp          Over 25 minutes spent on total encounter; more than 50% of the visit was spent counseling and/or coordinating care by the attending physician.      Juan Salcedo MD   Cardiovascular Disease  [phone]
    afebrile  REVIEW OF SYSTEMS:  CONSTITUTIONAL: No fever,  no  weight loss  ENT:  No  tinnitus,   no   vertigo  NECK: No pain or stiffness  RESPIRATORY: No cough, wheezing, chills or hemoptysis;    No Shortness of Breath  CARDIOVASCULAR: No chest pain, palpitations, dizziness  GASTROINTESTINAL: No abdominal or epigastric pain. No nausea, vomiting, or hematemesis; No diarrhea  No melena or hematochezia.  GENITOURINARY: No dysuria, frequency, hematuria, or incontinence  NEUROLOGICAL: No headaches  SKIN: No itching,  no   rash  LYMPH Nodes: No enlarged glands  ENDOCRINE: No heat or cold intolerance  MUSCULOSKELETAL: No joint pain or swelling  PSYCHIATRIC: No depression, anxiety  HEME/LYMPH: No easy bruising, or bleeding gums  ALLERGY AND IMMUNOLOGIC: No hives or eczema	    MEDICATIONS  (STANDING):  aspirin enteric coated 81 milliGRAM(s) Oral daily  atorvastatin 40 milliGRAM(s) Oral at bedtime  baclofen 10 milliGRAM(s) Oral every 8 hours  busPIRone 20 milliGRAM(s) Oral <User Schedule>  clotrimazole 1% Vaginal Cream 1 Applicatorful Vaginal at bedtime  clotrimazole/betamethasone Cream 1 Application(s) Topical two times a day  DULoxetine 60 milliGRAM(s) Oral two times a day  enoxaparin Injectable 40 milliGRAM(s) SubCutaneous every 24 hours  furosemide    Tablet 40 milliGRAM(s) Oral daily  gabapentin 300 milliGRAM(s) Oral <User Schedule>  hydrALAZINE 75 milliGRAM(s) Oral two times a day  levothyroxine 125 MICROGram(s) Oral daily  losartan 100 milliGRAM(s) Oral daily  melatonin 3 milliGRAM(s) Oral at bedtime  montelukast 10 milliGRAM(s) Oral daily  nystatin Powder 1 Application(s) Topical two times a day  OLANZapine 20 milliGRAM(s) Oral at bedtime  predniSONE   Tablet 5 milliGRAM(s) Oral daily  tiotropium 2.5 MICROgram(s) Inhaler 2 Puff(s) Inhalation daily    MEDICATIONS  (PRN):  acetaminophen     Tablet .. 650 milliGRAM(s) Oral every 6 hours PRN Temp greater or equal to 38C (100.4F), Mild Pain (1 - 3)  albuterol/ipratropium for Nebulization 3 milliLiter(s) Nebulizer every 6 hours PRN Shortness of Breath and/or Wheezing  loperamide 2 milliGRAM(s) Oral two times a day PRN Diarrhea      Vital Signs Last 24 Hrs  T(C): 36.5 (07 Jan 2023 13:11), Max: 37.3 (06 Jan 2023 20:55)  T(F): 97.7 (07 Jan 2023 13:11), Max: 99.1 (06 Jan 2023 20:55)  HR: 81 (07 Jan 2023 13:11) (70 - 85)  BP: 137/77 (07 Jan 2023 13:11) (131/76 - 157/81)  BP(mean): 106 (07 Jan 2023 06:25) (94 - 106)  RR: 18 (07 Jan 2023 13:11) (16 - 18)  SpO2: 93% (07 Jan 2023 13:11) (88% - 97%)    Parameters below as of 07 Jan 2023 15:04  Patient On (Oxygen Delivery Method): nasal cannula  O2 Flow (L/min): 3    CAPILLARY BLOOD GLUCOSE        I&O's Summary    06 Jan 2023 07:01  -  07 Jan 2023 07:00  --------------------------------------------------------  IN: 1200 mL / OUT: 1800 mL / NET: -600 mL    07 Jan 2023 07:01  -  07 Jan 2023 16:27  --------------------------------------------------------  IN: 600 mL / OUT: 2850 mL / NET: -2250 mL          Appearance: Normal	  HEENT:   Normal oral mucosa, PERRL, EOMI	  Lymphatic: No lymphadenopathy  Cardiovascular: Normal S1 S2, No JVD  Respiratory: Lungs clear to auscultation	  Psychiatry: A & O x 3, Mood & affect appropriate  Gastrointestinal:  Soft, Non-tender, + BS	  Skin: No rash, No ecchymoses	  Extremities: Normal range of motion  Vascular: Peripheral pulses palpable bilaterally    LABS:    01-07    142  |  101  |  12  ----------------------------<  109<H>  4.1   |  32<H>  |  0.71    Ca    9.7      07 Jan 2023 05:39  Mg     2.0     01-07                      Thyroid Stimulating Hormone, Serum: 4.98 uIU/mL (01-04 @ 05:22)          Consultant(s) Notes Reviewed:      Care Discussed with Consultants/Other Providers:    
Cardiovascular Disease Progress Note    Overnight events: No acute events overnight.  edema improving. no new cardiac sx  Otherwise review of systems negative    Objective Findings:  T(C): 36.8 (23 @ 04:51), Max: 37.4 (23 @ 20:13)  HR: 83 (23 @ 04:51) (67 - 83)  BP: 132/74 (23 @ 04:51) (114/65 - 132/74)  RR: 16 (23 @ 04:51) (16 - 18)  SpO2: 94% (23 @ 04:51) (92% - 94%)  Wt(kg): --  Daily     Daily Weight in k.8 (2023 08:30)      Physical Exam:  Gen: NAD  HEENT: EOMI  CV: RRR, normal S1 + S2, no m/r/g  Lungs: CTAB  Abd: soft, non-tender  Ext: No edema    Telemetry:    Laboratory Data:        142  |  102  |  15  ----------------------------<  95  3.7   |  31  |  0.83    Ca    9.4      2023 06:00                Inpatient Medications:  MEDICATIONS  (STANDING):  ammonium lactate 12% Lotion 1 Application(s) Topical <User Schedule>  aspirin enteric coated 81 milliGRAM(s) Oral daily  atorvastatin 40 milliGRAM(s) Oral at bedtime  baclofen 10 milliGRAM(s) Oral every 8 hours  busPIRone 20 milliGRAM(s) Oral <User Schedule>  clotrimazole 1% Vaginal Cream 1 Applicatorful Vaginal at bedtime  clotrimazole/betamethasone Cream 1 Application(s) Topical two times a day  DULoxetine 60 milliGRAM(s) Oral two times a day  enoxaparin Injectable 40 milliGRAM(s) SubCutaneous every 24 hours  furosemide   Injectable 40 milliGRAM(s) IV Push daily  gabapentin 300 milliGRAM(s) Oral <User Schedule>  hydrALAZINE 75 milliGRAM(s) Oral two times a day  levothyroxine 125 MICROGram(s) Oral daily  losartan 100 milliGRAM(s) Oral daily  melatonin 3 milliGRAM(s) Oral at bedtime  montelukast 10 milliGRAM(s) Oral daily  OLANZapine 20 milliGRAM(s) Oral at bedtime  predniSONE   Tablet 5 milliGRAM(s) Oral daily  tiotropium 2.5 MICROgram(s) Inhaler 2 Puff(s) Inhalation daily      Assessment:  72F w/ hx of kidney cancer c/b ?mets (s/p L partial nephrectomy, no chemo/RT), breast cancer in situ, prior DVT (not on AC), COPD (not on home O2), asthma, former smoker, anxiety, IBS with chronic diarrhea, Graves disease, Hypothyroidism, parathyroidectomy, HTN, HLD, NOEMY, obesity, venous insufficiency, prior COVID-19 infection (2021), presenting with worsening of chronic b/l LE swelling x2 weeks as well as development of LUE swelling x1 month.    Recs:  cardiac stable  generalized edema, likely secondary to body habitus, sedentary state and venous insufficiency, possible component of acute on chronic diastolic heart failure  cw diuretics with close monitoring of renal fxn  TTE limited and nondiagnositc. normal BNP argues against ADHF  venous doppler of lower ext and LUE  neg for DVT  f/u wound care  keeps legs elevated and wrapped        Over 25 minutes spent on total encounter; more than 50% of the visit was spent counseling and/or coordinating care by the attending physician.      Juan Salcedo MD   Cardiovascular Disease  [phone]
Cardiovascular Disease Progress Note    Overnight events: No acute events overnight.  no cp/sob/palps. + edema  Otherwise review of systems negative    Objective Findings:  T(C): 36.6 (23 @ 04:23), Max: 36.9 (23 @ 14:24)  HR: 75 (23 @ 04:23) (66 - 76)  BP: 136/72 (23 @ 04:23) (121/76 - 142/72)  RR: 16 (23 @ 04:23) (16 - 20)  SpO2: 94% (23 @ 04:23) (94% - 97%)  Wt(kg): --  Daily     Daily Weight in k.2 (2023 20:33)      Physical Exam:  Gen: NAD  HEENT: EOMI  CV: RRR, normal S1 + S2, no m/r/g  Lungs: CTAB  Abd: soft, non-tender  Ext: No edema    Telemetry:    Laboratory Data:                        13.1   9.28  )-----------( 174      ( 2023 05:22 )             43.4     -    142  |  100  |  15  ----------------------------<  121<H>  3.4<L>   |  30  |  0.84    Ca    9.4      2023 05:22  Phos  4.4     -  Mg     1.8         TPro  6.9  /  Alb  3.8  /  TBili  0.8  /  DBili  x   /  AST  14  /  ALT  12  /  AlkPhos  105  -04              Inpatient Medications:  MEDICATIONS  (STANDING):  aspirin enteric coated 81 milliGRAM(s) Oral daily  atorvastatin 40 milliGRAM(s) Oral at bedtime  baclofen 10 milliGRAM(s) Oral every 8 hours  busPIRone 20 milliGRAM(s) Oral <User Schedule>  DULoxetine 60 milliGRAM(s) Oral two times a day  enoxaparin Injectable 40 milliGRAM(s) SubCutaneous every 24 hours  furosemide   Injectable 40 milliGRAM(s) IV Push daily  gabapentin 300 milliGRAM(s) Oral <User Schedule>  hydrALAZINE 75 milliGRAM(s) Oral two times a day  levothyroxine 125 MICROGram(s) Oral daily  losartan 100 milliGRAM(s) Oral daily  melatonin 3 milliGRAM(s) Oral at bedtime  montelukast 10 milliGRAM(s) Oral daily  OLANZapine 20 milliGRAM(s) Oral at bedtime  predniSONE   Tablet 5 milliGRAM(s) Oral daily  tiotropium 2.5 MICROgram(s) Inhaler 2 Puff(s) Inhalation daily      Assessment:  72F w/ hx of kidney cancer c/b ?mets (s/p L partial nephrectomy, no chemo/RT), breast cancer in situ, prior DVT (not on AC), COPD (not on home O2), asthma, former smoker, anxiety, IBS with chronic diarrhea, Graves disease, Hypothyroidism, parathyroidectomy, HTN, HLD, NOEMY, obesity, venous insufficiency, prior COVID-19 infection (2021), presenting with worsening of chronic b/l LE swelling x2 weeks as well as development of LUE swelling x1 month.    Recs:  cardiac stable  generalized edema, likely secondary to body habitus, sedentary state and venous insufficiency, possible component of acute on chronic diastolic heart failure  cw diuretics with close monitoring of renal fxn  TTE limited and nondiagnositc. normal BNP argues against ADHF  obtain venous doppler of lower ext and LUE   vascular consultation  keeps legs elevated and wrapped        Over 25 minutes spent on total encounter; more than 50% of the visit was spent counseling and/or coordinating care by the attending physician.      Juan Salcedo MD   Cardiovascular Disease  [phone]
no

## 2023-01-12 NOTE — PROGRESS NOTE ADULT - PROVIDER SPECIALTY LIST ADULT
Internal Medicine
Cardiology
Internal Medicine
Cardiology
Internal Medicine

## 2023-01-12 NOTE — DISCHARGE NOTE NURSING/CASE MANAGEMENT/SOCIAL WORK - PATIENT PORTAL LINK FT
You can access the FollowMyHealth Patient Portal offered by [facility] by registering at the following website: [email or web address]. By joining [facility]’s FollowMyHealth portal, you will also be able to view your health information using other applications (apps) compatible with our system.

## 2023-01-12 NOTE — DISCHARGE NOTE NURSING/CASE MANAGEMENT/SOCIAL WORK - NSDCPEFALRISK_GEN_ALL_CORE
For information on Fall & Injury Prevention, visit: [email or web address] OR  [email or web address] OR  https://www.cdc.gov/steadi/patient.html

## 2023-01-12 NOTE — PROGRESS NOTE ADULT - REASON FOR ADMISSION
worsened swelling of extremities

## 2023-02-13 PROCEDURE — 99443: CPT | Mod: 95

## 2023-03-14 PROCEDURE — 99443: CPT | Mod: 95

## 2023-03-21 ENCOUNTER — INPATIENT (INPATIENT)
Facility: HOSPITAL | Age: 73
LOS: 2 days | Discharge: HOME HEALTH | DRG: 300 | End: 2023-03-24
Attending: INTERNAL MEDICINE | Admitting: INTERNAL MEDICINE
Payer: MEDICARE

## 2023-03-21 VITALS
SYSTOLIC BLOOD PRESSURE: 140 MMHG | TEMPERATURE: 98 F | OXYGEN SATURATION: 96 % | RESPIRATION RATE: 20 BRPM | WEIGHT: 212.08 LBS | HEIGHT: 60 IN | DIASTOLIC BLOOD PRESSURE: 60 MMHG | HEART RATE: 80 BPM

## 2023-03-21 DIAGNOSIS — Z98.89 OTHER SPECIFIED POSTPROCEDURAL STATES: Chronic | ICD-10-CM

## 2023-03-21 DIAGNOSIS — L03.90 CELLULITIS, UNSPECIFIED: ICD-10-CM

## 2023-03-21 DIAGNOSIS — Z90.5 ACQUIRED ABSENCE OF KIDNEY: Chronic | ICD-10-CM

## 2023-03-21 DIAGNOSIS — Z90.49 ACQUIRED ABSENCE OF OTHER SPECIFIED PARTS OF DIGESTIVE TRACT: Chronic | ICD-10-CM

## 2023-03-21 LAB
ALBUMIN SERPL ELPH-MCNC: 3.6 G/DL — SIGNIFICANT CHANGE UP (ref 3.3–5)
ALP SERPL-CCNC: 103 U/L — SIGNIFICANT CHANGE UP (ref 40–120)
ALT FLD-CCNC: 14 U/L — SIGNIFICANT CHANGE UP (ref 10–45)
ANION GAP SERPL CALC-SCNC: 12 MMOL/L — SIGNIFICANT CHANGE UP (ref 5–17)
APTT BLD: 51.3 SEC — HIGH (ref 27.5–35.5)
AST SERPL-CCNC: 18 U/L — SIGNIFICANT CHANGE UP (ref 10–40)
BASE EXCESS BLDV CALC-SCNC: 8.1 MMOL/L — HIGH (ref -2–3)
BASOPHILS # BLD AUTO: 0.06 K/UL — SIGNIFICANT CHANGE UP (ref 0–0.2)
BASOPHILS NFR BLD AUTO: 0.5 % — SIGNIFICANT CHANGE UP (ref 0–2)
BILIRUB SERPL-MCNC: 0.6 MG/DL — SIGNIFICANT CHANGE UP (ref 0.2–1.2)
BUN SERPL-MCNC: 18 MG/DL — SIGNIFICANT CHANGE UP (ref 7–23)
CA-I SERPL-SCNC: 1.1 MMOL/L — LOW (ref 1.15–1.33)
CALCIUM SERPL-MCNC: 9.8 MG/DL — SIGNIFICANT CHANGE UP (ref 8.4–10.5)
CHLORIDE BLDV-SCNC: 102 MMOL/L — SIGNIFICANT CHANGE UP (ref 96–108)
CHLORIDE SERPL-SCNC: 103 MMOL/L — SIGNIFICANT CHANGE UP (ref 96–108)
CK MB BLD-MCNC: 1.4 % — SIGNIFICANT CHANGE UP (ref 0–3.5)
CK MB CFR SERPL CALC: 1.8 NG/ML — SIGNIFICANT CHANGE UP (ref 0–3.8)
CK SERPL-CCNC: 126 U/L — SIGNIFICANT CHANGE UP (ref 25–170)
CO2 BLDV-SCNC: 39 MMOL/L — HIGH (ref 22–26)
CO2 SERPL-SCNC: 28 MMOL/L — SIGNIFICANT CHANGE UP (ref 22–31)
CREAT SERPL-MCNC: 0.67 MG/DL — SIGNIFICANT CHANGE UP (ref 0.5–1.3)
EGFR: 92 ML/MIN/1.73M2 — SIGNIFICANT CHANGE UP
EOSINOPHIL # BLD AUTO: 0.23 K/UL — SIGNIFICANT CHANGE UP (ref 0–0.5)
EOSINOPHIL NFR BLD AUTO: 2 % — SIGNIFICANT CHANGE UP (ref 0–6)
FLUAV AG NPH QL: SIGNIFICANT CHANGE UP
FLUBV AG NPH QL: SIGNIFICANT CHANGE UP
GAS PNL BLDV: 128 MMOL/L — LOW (ref 136–145)
GAS PNL BLDV: SIGNIFICANT CHANGE UP
GAS PNL BLDV: SIGNIFICANT CHANGE UP
GLUCOSE BLDV-MCNC: 121 MG/DL — HIGH (ref 70–99)
GLUCOSE SERPL-MCNC: 128 MG/DL — HIGH (ref 70–99)
HCO3 BLDV-SCNC: 37 MMOL/L — HIGH (ref 22–29)
HCT VFR BLD CALC: 38.7 % — SIGNIFICANT CHANGE UP (ref 34.5–45)
HCT VFR BLDA CALC: 36 % — SIGNIFICANT CHANGE UP (ref 34.5–46.5)
HGB BLD CALC-MCNC: 12.1 G/DL — SIGNIFICANT CHANGE UP (ref 11.7–16.1)
HGB BLD-MCNC: 11.9 G/DL — SIGNIFICANT CHANGE UP (ref 11.5–15.5)
IMM GRANULOCYTES NFR BLD AUTO: 0.4 % — SIGNIFICANT CHANGE UP (ref 0–0.9)
INR BLD: 1.16 RATIO — SIGNIFICANT CHANGE UP (ref 0.88–1.16)
LACTATE BLDV-MCNC: 1.9 MMOL/L — SIGNIFICANT CHANGE UP (ref 0.5–2)
LYMPHOCYTES # BLD AUTO: 1.25 K/UL — SIGNIFICANT CHANGE UP (ref 1–3.3)
LYMPHOCYTES # BLD AUTO: 10.9 % — LOW (ref 13–44)
MCHC RBC-ENTMCNC: 30.4 PG — SIGNIFICANT CHANGE UP (ref 27–34)
MCHC RBC-ENTMCNC: 30.7 GM/DL — LOW (ref 32–36)
MCV RBC AUTO: 99 FL — SIGNIFICANT CHANGE UP (ref 80–100)
MONOCYTES # BLD AUTO: 1.09 K/UL — HIGH (ref 0–0.9)
MONOCYTES NFR BLD AUTO: 9.5 % — SIGNIFICANT CHANGE UP (ref 2–14)
NEUTROPHILS # BLD AUTO: 8.77 K/UL — HIGH (ref 1.8–7.4)
NEUTROPHILS NFR BLD AUTO: 76.7 % — SIGNIFICANT CHANGE UP (ref 43–77)
NRBC # BLD: 0 /100 WBCS — SIGNIFICANT CHANGE UP (ref 0–0)
OTHER CELLS CSF MANUAL: 11.6 ML/DL — LOW (ref 18–22)
PCO2 BLDV: 71 MMHG — HIGH (ref 39–42)
PH BLDV: 7.32 — SIGNIFICANT CHANGE UP (ref 7.32–7.43)
PLATELET # BLD AUTO: 168 K/UL — SIGNIFICANT CHANGE UP (ref 150–400)
PO2 BLDV: 40 MMHG — SIGNIFICANT CHANGE UP (ref 25–45)
POTASSIUM BLDV-SCNC: >10 MMOL/L — CRITICAL HIGH (ref 3.5–5.1)
POTASSIUM SERPL-MCNC: 5 MMOL/L — SIGNIFICANT CHANGE UP (ref 3.5–5.3)
POTASSIUM SERPL-SCNC: 5 MMOL/L — SIGNIFICANT CHANGE UP (ref 3.5–5.3)
PROT SERPL-MCNC: 6.4 G/DL — SIGNIFICANT CHANGE UP (ref 6–8.3)
PROTHROM AB SERPL-ACNC: 13.5 SEC — HIGH (ref 10.5–13.4)
RBC # BLD: 3.91 M/UL — SIGNIFICANT CHANGE UP (ref 3.8–5.2)
RBC # FLD: 15.2 % — HIGH (ref 10.3–14.5)
RSV RNA NPH QL NAA+NON-PROBE: SIGNIFICANT CHANGE UP
SAO2 % BLDV: 64.2 % — LOW (ref 67–88)
SARS-COV-2 RNA SPEC QL NAA+PROBE: SIGNIFICANT CHANGE UP
SODIUM SERPL-SCNC: 143 MMOL/L — SIGNIFICANT CHANGE UP (ref 135–145)
TROPONIN T, HIGH SENSITIVITY RESULT: 37 NG/L — SIGNIFICANT CHANGE UP (ref 0–51)
TROPONIN T, HIGH SENSITIVITY RESULT: 38 NG/L — SIGNIFICANT CHANGE UP (ref 0–51)
TSH SERPL-MCNC: 2.87 UIU/ML — SIGNIFICANT CHANGE UP (ref 0.27–4.2)
WBC # BLD: 11.45 K/UL — HIGH (ref 3.8–10.5)
WBC # FLD AUTO: 11.45 K/UL — HIGH (ref 3.8–10.5)

## 2023-03-21 PROCEDURE — 99285 EMERGENCY DEPT VISIT HI MDM: CPT

## 2023-03-21 PROCEDURE — 71045 X-RAY EXAM CHEST 1 VIEW: CPT | Mod: 26

## 2023-03-21 PROCEDURE — 93970 EXTREMITY STUDY: CPT | Mod: 26

## 2023-03-21 RX ORDER — MONTELUKAST 4 MG/1
10 TABLET, CHEWABLE ORAL DAILY
Refills: 0 | Status: DISCONTINUED | OUTPATIENT
Start: 2023-03-21 | End: 2023-03-24

## 2023-03-21 RX ORDER — CEFAZOLIN SODIUM 1 G
2000 VIAL (EA) INJECTION ONCE
Refills: 0 | Status: COMPLETED | OUTPATIENT
Start: 2023-03-21 | End: 2023-03-21

## 2023-03-21 RX ORDER — HYDRALAZINE HCL 50 MG
75 TABLET ORAL
Refills: 0 | Status: DISCONTINUED | OUTPATIENT
Start: 2023-03-21 | End: 2023-03-24

## 2023-03-21 RX ORDER — METOPROLOL TARTRATE 50 MG
25 TABLET ORAL
Refills: 0 | Status: DISCONTINUED | OUTPATIENT
Start: 2023-03-21 | End: 2023-03-24

## 2023-03-21 RX ORDER — LOSARTAN POTASSIUM 100 MG/1
100 TABLET, FILM COATED ORAL DAILY
Refills: 0 | Status: DISCONTINUED | OUTPATIENT
Start: 2023-03-21 | End: 2023-03-24

## 2023-03-21 RX ORDER — LANOLIN ALCOHOL/MO/W.PET/CERES
3 CREAM (GRAM) TOPICAL AT BEDTIME
Refills: 0 | Status: DISCONTINUED | OUTPATIENT
Start: 2023-03-21 | End: 2023-03-24

## 2023-03-21 RX ORDER — BACLOFEN 100 %
5 POWDER (GRAM) MISCELLANEOUS EVERY 8 HOURS
Refills: 0 | Status: DISCONTINUED | OUTPATIENT
Start: 2023-03-21 | End: 2023-03-24

## 2023-03-21 RX ORDER — LEVOTHYROXINE SODIUM 125 MCG
125 TABLET ORAL DAILY
Refills: 0 | Status: DISCONTINUED | OUTPATIENT
Start: 2023-03-21 | End: 2023-03-24

## 2023-03-21 RX ORDER — HEPARIN SODIUM 5000 [USP'U]/ML
10000 INJECTION INTRAVENOUS; SUBCUTANEOUS ONCE
Refills: 0 | Status: COMPLETED | OUTPATIENT
Start: 2023-03-21 | End: 2023-03-21

## 2023-03-21 RX ORDER — OLANZAPINE 15 MG/1
20 TABLET, FILM COATED ORAL AT BEDTIME
Refills: 0 | Status: DISCONTINUED | OUTPATIENT
Start: 2023-03-21 | End: 2023-03-22

## 2023-03-21 RX ORDER — GABAPENTIN 400 MG/1
100 CAPSULE ORAL
Refills: 0 | Status: DISCONTINUED | OUTPATIENT
Start: 2023-03-21 | End: 2023-03-24

## 2023-03-21 RX ORDER — ASPIRIN/CALCIUM CARB/MAGNESIUM 324 MG
81 TABLET ORAL DAILY
Refills: 0 | Status: DISCONTINUED | OUTPATIENT
Start: 2023-03-21 | End: 2023-03-24

## 2023-03-21 RX ORDER — IPRATROPIUM/ALBUTEROL SULFATE 18-103MCG
3 AEROSOL WITH ADAPTER (GRAM) INHALATION EVERY 6 HOURS
Refills: 0 | Status: DISCONTINUED | OUTPATIENT
Start: 2023-03-21 | End: 2023-03-24

## 2023-03-21 RX ORDER — ACETAMINOPHEN 500 MG
650 TABLET ORAL ONCE
Refills: 0 | Status: COMPLETED | OUTPATIENT
Start: 2023-03-21 | End: 2023-03-21

## 2023-03-21 RX ORDER — IBUPROFEN 200 MG
600 TABLET ORAL ONCE
Refills: 0 | Status: COMPLETED | OUTPATIENT
Start: 2023-03-21 | End: 2023-03-21

## 2023-03-21 RX ORDER — OXYCODONE AND ACETAMINOPHEN 5; 325 MG/1; MG/1
1 TABLET ORAL EVERY 6 HOURS
Refills: 0 | Status: DISCONTINUED | OUTPATIENT
Start: 2023-03-21 | End: 2023-03-24

## 2023-03-21 RX ORDER — AMLODIPINE BESYLATE 2.5 MG/1
5 TABLET ORAL DAILY
Refills: 0 | Status: DISCONTINUED | OUTPATIENT
Start: 2023-03-21 | End: 2023-03-24

## 2023-03-21 RX ORDER — CEFAZOLIN SODIUM 1 G
VIAL (EA) INJECTION
Refills: 0 | Status: DISCONTINUED | OUTPATIENT
Start: 2023-03-21 | End: 2023-03-24

## 2023-03-21 RX ORDER — FUROSEMIDE 40 MG
40 TABLET ORAL ONCE
Refills: 0 | Status: COMPLETED | OUTPATIENT
Start: 2023-03-21 | End: 2023-03-21

## 2023-03-21 RX ORDER — HEPARIN SODIUM 5000 [USP'U]/ML
INJECTION INTRAVENOUS; SUBCUTANEOUS
Qty: 25000 | Refills: 0 | Status: DISCONTINUED | OUTPATIENT
Start: 2023-03-21 | End: 2023-03-22

## 2023-03-21 RX ORDER — TIOTROPIUM BROMIDE 18 UG/1
2 CAPSULE ORAL; RESPIRATORY (INHALATION) DAILY
Refills: 0 | Status: DISCONTINUED | OUTPATIENT
Start: 2023-03-21 | End: 2023-03-24

## 2023-03-21 RX ORDER — ATORVASTATIN CALCIUM 80 MG/1
40 TABLET, FILM COATED ORAL AT BEDTIME
Refills: 0 | Status: DISCONTINUED | OUTPATIENT
Start: 2023-03-21 | End: 2023-03-24

## 2023-03-21 RX ORDER — ACETAMINOPHEN 500 MG
650 TABLET ORAL EVERY 6 HOURS
Refills: 0 | Status: DISCONTINUED | OUTPATIENT
Start: 2023-03-21 | End: 2023-03-24

## 2023-03-21 RX ORDER — HEPARIN SODIUM 5000 [USP'U]/ML
10000 INJECTION INTRAVENOUS; SUBCUTANEOUS EVERY 6 HOURS
Refills: 0 | Status: DISCONTINUED | OUTPATIENT
Start: 2023-03-21 | End: 2023-03-22

## 2023-03-21 RX ORDER — HEPARIN SODIUM 5000 [USP'U]/ML
5000 INJECTION INTRAVENOUS; SUBCUTANEOUS EVERY 6 HOURS
Refills: 0 | Status: DISCONTINUED | OUTPATIENT
Start: 2023-03-21 | End: 2023-03-22

## 2023-03-21 RX ORDER — FUROSEMIDE 40 MG
40 TABLET ORAL DAILY
Refills: 0 | Status: DISCONTINUED | OUTPATIENT
Start: 2023-03-21 | End: 2023-03-24

## 2023-03-21 RX ORDER — CEFAZOLIN SODIUM 1 G
2000 VIAL (EA) INJECTION EVERY 8 HOURS
Refills: 0 | Status: DISCONTINUED | OUTPATIENT
Start: 2023-03-21 | End: 2023-03-24

## 2023-03-21 RX ORDER — DULOXETINE HYDROCHLORIDE 30 MG/1
60 CAPSULE, DELAYED RELEASE ORAL DAILY
Refills: 0 | Status: DISCONTINUED | OUTPATIENT
Start: 2023-03-21 | End: 2023-03-24

## 2023-03-21 RX ADMIN — Medication 650 MILLIGRAM(S): at 11:30

## 2023-03-21 RX ADMIN — Medication 100 MILLIGRAM(S): at 12:31

## 2023-03-21 RX ADMIN — Medication 600 MILLIGRAM(S): at 11:45

## 2023-03-21 RX ADMIN — Medication 40 MILLIGRAM(S): at 11:29

## 2023-03-21 RX ADMIN — Medication 600 MILLIGRAM(S): at 12:49

## 2023-03-21 RX ADMIN — Medication 650 MILLIGRAM(S): at 12:21

## 2023-03-21 RX ADMIN — HEPARIN SODIUM 2400 UNIT(S)/HR: 5000 INJECTION INTRAVENOUS; SUBCUTANEOUS at 17:12

## 2023-03-21 RX ADMIN — HEPARIN SODIUM 10000 UNIT(S): 5000 INJECTION INTRAVENOUS; SUBCUTANEOUS at 17:12

## 2023-03-21 RX ADMIN — ATORVASTATIN CALCIUM 40 MILLIGRAM(S): 80 TABLET, FILM COATED ORAL at 22:10

## 2023-03-21 RX ADMIN — Medication 2000 MILLIGRAM(S): at 12:49

## 2023-03-21 NOTE — ED ADULT TRIAGE NOTE - GLASGOW COMA SCALE: BEST MOTOR RESPONSE, MLM
(M6) obeys commands
Normal rate, regular rhythm.  Heart sounds S1, S2.  No murmurs, rubs or gallops.

## 2023-03-21 NOTE — ED PROVIDER NOTE - CLINICAL SUMMARY MEDICAL DECISION MAKING FREE TEXT BOX
Julisa RING PGY-3: 72-year-old female with history of COPD (not on home O2), history of DVT not on AC, Graves' disease, HLD, HTN, hypothyroidism, breast cancer in situ, parathyroidectomy, kidney disease with mets status post left partial nephrectomy (on chemo/RT), obesity and lymphedema presenting to the ED for lower extremity swelling progressively worsening over the past month. Reports compliance w/ 40 mg Lasix once daily, did not take dose today will take dose here.  Patient in no acute distress vitals within normal limits.  Chronically ill-appearing.  Awake and oriented.  Lungs clear to auscultation bilaterally.  Abdomen soft nontender no rebound or guarding.  Bilateral pitting edema to lower extremities, with erythema.  Possible CHF exacerbation vs cellulitis vs dvt. Will obtain DVT US. Will give dose IV lasix, obtain bnp. Anticipate admission to tele. : 72-year-old female with history of COPD (not on home O2), history of DVT not on AC, Graves' disease, HLD, HTN, hypothyroidism, breast cancer in situ, parathyroidectomy, kidney disease with mets status post left partial nephrectomy (on chemo/RT), obesity and lymphedema presenting to the ED for lower extremity swelling progressively worsening over the past month. Reports compliance w/ 40 mg Lasix once daily, did not take dose today will take dose here.  Patient in no acute distress vitals within normal limits.  Chronically ill-appearing.  Awake and oriented.  Lungs clear to auscultation bilaterally.  Abdomen soft nontender no rebound or guarding.  Bilateral pitting edema to lower extremities, with erythema.  Possible CHF exacerbation vs cellulitis vs dvt. Will obtain DVT US. Will give dose IV lasix, obtain bnp. Anticipate admission to tele. ZR : 72-year-old female with history of COPD (not on home O2), history of DVT not on AC, Graves' disease, HLD, HTN, hypothyroidism, breast cancer in situ, parathyroidectomy, kidney disease with mets status post left partial nephrectomy (on chemo/RT), obesity and lymphedema presenting to the ED for lower extremity swelling progressively worsening over the past month. Reports compliance w/ 40 mg Lasix once daily, did not take dose today will take dose here.  Patient in no acute distress vitals within normal limits.  Chronically ill-appearing.  Awake and oriented.  Lungs clear to auscultation bilaterally.  Abdomen soft nontender no rebound or guarding.  Bilateral pitting edema to lower extremities, with erythema.  Possible CHF exacerbation vs cellulitis vs dvt. Will obtain DVT US. Will give dose IV lasix, obtain bnp. Anticipate admission. ZR

## 2023-03-21 NOTE — H&P ADULT - NSHPPHYSICALEXAM_GEN_ALL_CORE
PHYSICAL EXAMINATION:  Vital Signs Last 24 Hrs  T(C): 36.8 (21 Mar 2023 10:36), Max: 36.8 (21 Mar 2023 10:36)  T(F): 98.3 (21 Mar 2023 10:36), Max: 98.3 (21 Mar 2023 10:36)  HR: 80 (21 Mar 2023 10:36) (80 - 80)  BP: 140/60 (21 Mar 2023 10:36) (140/60 - 140/60)  BP(mean): --  RR: 20 (21 Mar 2023 10:36) (20 - 20)  SpO2: 96% (21 Mar 2023 10:36) (96% - 96%)    Parameters below as of 21 Mar 2023 10:36  Patient On (Oxygen Delivery Method): nasal cannula,on home O2 atc  O2 Flow (L/min): 3    CAPILLARY BLOOD GLUCOSE          GENERAL: NAD   HEAD:  atraumatic, normocephalic  EYES: sclera anicteric  ENMT: mucous membranes moist  NECK: supple, No JVD  CHEST/LUNG: clear to auscultation bilaterally; no rales, rhonchi, or wheezing b/l  HEART: normal S1, S2  ABDOMEN: BS+, soft, ND, NT   EXTREMITIES: 3 + bipedal edema b/l LEs + bilateral erythema L>R   NEURO: awake, alert, interactive; moves all extremities  SKIN: chronic changes rashes bilateral legs

## 2023-03-21 NOTE — ED ADULT NURSE NOTE - OBJECTIVE STATEMENT
First encounter with the pt, pt received from triage with complaints of ble swelling/pain/ increased reddness. Pt is a/ox4 and verbal. Speech is clear and able to speak in full sentences without distress. Airway is patent with no obstruction nor blocking secretions. Breathing is even and unlabored on room air. Pt has strong pulses palpated bilaterally. Neuro check is wnl. Full rom. Pt denies chest pain, n/v and sob. No acute distress noted. Pt has call light within the reach of the pt at the bedside. Will continue to monitor the pt.

## 2023-03-21 NOTE — ED ADULT NURSE NOTE - AS PAIN REST
AFTER SURGERY DISCHARGE INSTRUCTIONS  Rory Angulo MD      Your eye will take approximately 6-8 weeks to heal.  To prevent any complications, it is important for you to understand and follow these instructions.    Protection of you eye    · For the first (1) day after surgery, your eye should be protected at all times, either with an eye pad during the day and an eye pad and metal shield at night.  Make sure your eye is closed and apply the patch tight enough so you cannot blink under the patch.  After this, glasses may be worn during the day and the metal shield at night per your surgeon's instructions.   · Do not get water of anything unclean into your eye.   · Avoid vigorous shaving and brushing of your teeth and hair.  You may wash your hair gently after one week, but wear an eye patch.   · Never wipe your eye with a soiled tissue or handkerchief.      Activity: Face down until Monday morning. Then Head can be up.    Position: Whenever you are lying down, turn far to the  right side or on your stomach. When not lying down, tip head forward and downward, parallel to the floor.       · Avoid running, jumping, sudden jerky movements, or exerting yourself excessively at anything for 3 weeks.   · Your surgeon will tell you when it is safe for you to drive  · You may swim after 6 weeks  · You may watch television and read if possible  · You may have not depth perception, so you are at a very high risk for falling.  · You may resume your normal home diet.    Call your doctor if you have any of the following signs or symptoms:    · Pus-like discharge from your eye (a small amount of discharge is to be expected)  · Pain in your eye that persists and is not relieved by pain medications.  A scratchy feeling is to be expected.   · Sudden decrease in your vision  · Nausea and vomiting that persists and is not relieved by medication.  This may be an indication of high eye pressure and it is important to let your doctor know  right away.      Administration of eye drops    1.  Begin your eye drops according to the schedule: Please leave your eye patch in place until you see the doctor tomorrow for your follow-up appointment, you will begin drops starting at your appointment.  (Please bring all eye drops with you)  2.  Wash your hands thoroughly with soap and water.  3.  Remove your eye patch (if on).  Discard the soiled eye pad (if on).  4.  Tilt your head back, look toward the ceiling with both eyes open.   5.  With one hand, pull the lower lid down.   6.  Put a drop of medicine in the sac behind the lashes of the lower lid.   7.  Do not give more than on eye drop at a time.  After the drop, close your eye and wait 5 minutes between drops.    8.  Ask a friend or relative to check and see if you actually are getting the eye drops into the eye.     Please contact the office if you have any questions.    __ [address] [place], [place] [id number] [phone]    __ [employer] [facility] [address] [place], [phone]    __[facility] [address] [place], [place] [id number] [phone]    __[facility] [address] [place] [address] [phone]    __ [place] Office [address]  WHEN YOU HAVE A GAS BUBBLE IN YOUR EYE    Face down positioning after retinal surgery is one of the hardest things we ask our patients to do.  Maintaining the correct position is essential to the success of the surgery.  Incorrect positioning can result in glaucoma and cataracts.  Face down positioning is required at all times except for application of eye drops and taking your medication.  The face down position keeps the bubble where it belongs to aid in the healing of the retina.  Your body reabsorbs the gas bubble in the weeks following surgery.     POSITIONING   · When sitting or standing, your face should be downward, parallel to the floor,  with your eyes facing straight down.  Do not look to your right or left.  Your eyes must be focused on the floor.   · While sitting, you can rest your elbows on your lap, with your forehead in your hands, looking down.  If you are seated at a table, you can place a pillow on the table in front of you, place your arms on the pillow and rest your forehead on your arms while looking down.   · When walking around, you must keep your head down, eyes looking at your toes and not in front of you.   · You can read with a book in your lap or listen to books on tape.  You can also put a small TV on the floor beneath you to watch television.  You Can use reflective mirrors to watch TV as long as your head is down.   · While in bed, you must remain in a face down position.  You can lie on your stomach or right side with a pillow under your ear.  Your face should be turned so that your nose is pointed toward the mattress - as thought you were looking through the mattress to the floor.    · One helpful hint to stay on your side in bed,is to roll up blankets and put them behind you, under a fitted sheet.  You can \"hug\" a pillow in front of your chest and abdomen, rolling into the pillow to keep you comfortable toward the mattress.  Wearing a pocket T-shirt backwards with a tennis ball in the pocket can also serve as a reminder to stay on your side.     EATING AND DRINKING  · You must eat and drink in the face down position.  One way to eat is to put food in a chair directly in front of you.  You can also put the food on a tray in your lap or on a low table.  Your food must be below you, so your eyes continue looking downward.   · You must drink all liquids through a straw, being careful when you drink hot liquids so you do not burn your mouth or tongue!    IMPORTANT INFORMATION  · For your drive home, you should ride in the middle of the back seat.  If you can, avoid sitting behind seat backs and air bags.  For longer distances,  place several pillows on your lap and rest your forehead on your hands, with your arms folded on top of the pillows.    · A green bracelet will be applied to your wrist after surgery.  This bracelet advised health care providers that you have a gas bubble in your eye and warns them against the use of nitrous oxide - an anesthetic gas.  Inform you doctor or dentist if you are undergoing any surgical or dental procedures or hyperbaric oxygen therapy.  Your ophthalmologist will tell you when the bracelet can be removed.   · You are not to travel in an airplane or travel to a higher elevation until your surgeon tells you it is safe.  Changes in atmospheric pressure with a gas bubble can also result in blindness.  If you have eye pain while traveling, change your route to head for a lower altitude or stop until eye pressure equilibrates and the pain subsides.   · There are rental equipment companies available in the [facility] to assist you in maintaining the proper head positioning.  Brochures are available from your nurse.     This is a difficult lifestyle adjustment, but it is only for a limited time.  Ask your family, friends and neighbors to assist you in having a successful retinal surgery.     Green gas bubble bracelet applied to right  wrist.     Care After Anesthesia or Sedation    After Discharge  · Due to the medicine given, someone must drive you home.  If possible, have someone stay with you at home the day of discharge and the night after surgery.  · If you have infants or small children at home, please have someone help you for at least 24 hours after your surgery.  · Do not drive for at least 24 hours after surgery (or as told by your doctor).  · Rest for the remainder of the day. Go up and down stairs slowly.  · Do not smoke after surgery.  Smoking can delay healing.  · Do not operate heavy or potential harmful equipment.  · Do not make legally binding decisions.  · Do no drink alcohol for 24  hours.    Diet  · Nausea may be expected for the first 24 to 48 hours.  Start eating a bland diet (toast, gelatin, 7-up, hot cereal, crackers, sherbet, broth, soup).  · Drink plenty of fluids (6 to 8 glasses of water).  · Resume your regular diet as able.  · Avoid greasy or spicy foods for 24 hours.    Urination  · The effects of anesthetics may cause some people to have trouble passing urine the day of surgery.  Drink a lot of fluids to help prevent this.  · Try to urinate within 12 hours of surgery.  · If you are unable to pass urine and feel like you need to, call your doctor or the hospital.    Pain Control  · If your incision was injected with a long acting local anesthetic, it will wear off in 4 to 6 hours.  You can expect to have some pain at this time.  · Treat your pain with the prescribed pain medicine before it wears off.  Do not wait until your pain becomes severe.    · Ask your nurse when you had your last pain medicine, so you know when you can take another one after you get home.    · Your last pain medicine was given at _________.    Call your doctor if you have:    · Nausea and vomiting that does not stop  · Fever over 101 degrees F.  · Pain not relieved by pain medication  · If you feel you have to pass urine and you are not able to do so.  · Unusual changes in behavior  · Dizziness  · Hives     If you are not able to reach your doctor, you may call the emergency department.     5 (moderate pain)

## 2023-03-21 NOTE — ED ADULT NURSE NOTE - NSIMPLEMENTINTERV_GEN_ALL_ED
Implemented All Fall with Harm Risk Interventions:  [place] to call system. Call bell, personal items and telephone within reach. Instruct patient to call for assistance. Room bathroom lighting operational. Non-slip footwear when patient is off stretcher. Physically safe environment: no spills, clutter or unnecessary equipment. Stretcher in lowest position, wheels locked, appropriate side rails in place. Provide visual cue, wrist band, yellow gown, etc. Monitor gait and stability. Monitor for mental status changes and reorient to person, place, and time. Review medications for side effects contributing to fall risk. Reinforce activity limits and safety measures with patient and family. Provide visual clues: red socks.

## 2023-03-21 NOTE — H&P ADULT - NSHPLABSRESULTS_GEN_ALL_CORE
11.9   11.45 )-----------( 168      ( 21 Mar 2023 11:47 )             38.7       03-21    143  |  103  |  18  ----------------------------<  128<H>  5.0   |  28  |  0.67    Ca    9.8      21 Mar 2023 11:47    TPro  6.4  /  Alb  3.6  /  TBili  0.6  /  DBili  x   /  AST  18  /  ALT  14  /  AlkPhos  103  03-21          EKG A Flutter NSST/T    < from: [facility] Duplex Lower Ext Vein Scan, Bilat (03.21.23 @ 14:05) >    IMPRESSION:    No evidence of deep venous thrombosis in either lower extremity.    < end of copied text >    < from: Xray Chest 1 View- PORTABLE-Urgent (Xray Chest 1 View- PORTABLE-Urgent .) (03.21.23 @ 11:29) >      IMPRESSION:  Mild pulmonary vascular congestion and small bilateral pleural effusions.    < end of copied text >

## 2023-03-21 NOTE — H&P ADULT - HISTORY OF PRESENT ILLNESS
72 year-old-female with history of COPD (not on home O2), hx of DVT not on AC, Graves disease, HLD, HTN, hypothyroidism, breast cancer in situ, parathyroidectomy, kidney cancer w/mets s/p L partial nephrectomy (not on chemo/RT), obesity, tremors, and chronic lymphedema now presenting to the ED via EMS for worsening b/l lower extremity swelling x 1 month. Patient with decreased ambulatory status during this time frame, uses walker at baseline to ambulate. Has home health aide daily 9.5 hours. Pt compliant with lasix medication. Pt brought to ED given worsening leg swelling and erythema. States was seen last time for similar presentation. Arrives on home O2 (2L). Deneis chest pain, sob, abdominal pain, nausea/vomiting, fever/chills. Did not take lasix medication today.

## 2023-03-21 NOTE — H&P ADULT - ASSESSMENT
72 f with    Cellulitis legs  - panculture  - antibiotic  - ID evaluation dr. King  - Dermatology evaluation    A Flutter  - AC with Heparin  - telemetry  - cardiac enzymes  - TSH  - Echo  - Cardiology evaluation Dr. Salcedo    Anxiety / Depression   - control    Asthma   - nebs    Breast cancer in situ, left   - stable    COPD (chronic obstructive pulmonary disease)   - nebs    Graves disease   - follow TSH    Hyperlipidemia   - stable    Hypertension   - control    Hypothyroid   - continue supplementation    Kidney cancer, primary, with metastasis from kidney to other site, left LEft sided- s/p nephrectomy only- no chemo or RT  - stable    Obesity   - Nutrition education    DVT prophylaxis  - AC    Further action as per clinical course     Nathan Mora MD phone [phone]

## 2023-03-21 NOTE — ED PROVIDER NOTE - OBJECTIVE STATEMENT
72 year-old-female with history of COPD (not on home O2), hx of DVT not on AC, Graves disease, HLD, HTN, hypothyroidism, breast cancer in situ, parathyroidectomy, kidney cancer w/mets s/p L partial nephrectomy (not on chemo/RT), obesity and chronic lymphedema now presenting to the ED via EMS for worsening b/l lower extremity swelling x 1 month. Patient with decreased ambulatory status during this time frame, uses walker at baseline to ambulate. Has home health aide daily 9.5 hours. Pt brought to ED given worsening leg swelling and erythema. States was seen last time for similar presentation. Arrives on home O2 (2L). Deneis chest pain, sob, abdominal pain, nausea/vomiting, fever/chills. 72 year-old-female with history of COPD (not on home O2), hx of DVT not on AC, Graves disease, HLD, HTN, hypothyroidism, breast cancer in situ, parathyroidectomy, kidney cancer w/mets s/p L partial nephrectomy (not on chemo/RT), obesity, tremors, and chronic lymphedema now presenting to the ED via EMS for worsening b/l lower extremity swelling x 1 month. Patient with decreased ambulatory status during this time frame, uses walker at baseline to ambulate. Has home health aide daily 9.5 hours. Pt compliant with lasix medication. Pt brought to ED given worsening leg swelling and erythema. States was seen last time for similar presentation. Arrives on home O2 (2L). Deneis chest pain, sob, abdominal pain, nausea/vomiting, fever/chills. Did not take lasix medication today.     PMD: Dr Juan Salcedo

## 2023-03-21 NOTE — ED ADULT NURSE NOTE - ISOLATION TYPE:
Pt presents for TB skin test placement.  No other concerns or complaints.  Skin test placed on left forearm at 1430.  Pt will return in 48-72 hours for read.    Thank you for choosing [facility] for your healthcare needs.    All the best,  Leonides Hernández, CNP 1/27/2019 3:04 PM   None

## 2023-03-21 NOTE — H&P ADULT - NSHPREVIEWOFSYSTEMS_GEN_ALL_CORE
REVIEW OF SYSTEMS:    CONSTITUTIONAL: + weakness, no fevers + chills  EYES/ENT: No visual changes;  No vertigo or throat pain   NECK: No pain or stiffness  RESPIRATORY: No cough, wheezing, hemoptysis; No shortness of breath  CARDIOVASCULAR: No chest pain or palpitations  GASTROINTESTINAL: No abdominal or epigastric pain. No nausea, vomiting, or hematemesis; No diarrhea or constipation. No melena or hematochezia.  GENITOURINARY: No dysuria, frequency or hematuria  NEUROLOGICAL: No numbness or weakness  SKIN: No itching, burning, + legs rashes   All other review of systems is negative unless indicated above.

## 2023-03-21 NOTE — ED ADULT NURSE REASSESSMENT NOTE - NS ED NURSE REASSESS COMMENT FT1
Call made by covering ED RN to NP León questioning pt admission order. Pt admitted to medicine, but order to be changed to telemetry. NP aware.

## 2023-03-21 NOTE — H&P ADULT - NSHPSOCIALHISTORY_GEN_ALL_CORE
Social History:    Marital Status:  (  x )    (   ) Single    (   )    (  )   Occupation:   Lives with: (  ) alone  (  ) children   ( x ) spouse   (  ) parents  (  ) other    Substance Use (street drugs): (x  ) never used  (  ) other:  Tobacco Usage:  (   ) never smoked   ( x  ) former smoker   (   ) current smoker  (     ) pack years  (        ) last cigarette date  Alcohol Usage: denies    (     ) Advanced Directives: (     ) None    (      ) DNR    (     ) DNI    (     ) Health Care Proxy:

## 2023-03-22 LAB
ANION GAP SERPL CALC-SCNC: 12 MMOL/L — SIGNIFICANT CHANGE UP (ref 5–17)
APTT BLD: >200 SEC — CRITICAL HIGH (ref 27.5–35.5)
APTT BLD: >200 SEC — CRITICAL HIGH (ref 27.5–35.5)
BUN SERPL-MCNC: 15 MG/DL — SIGNIFICANT CHANGE UP (ref 7–23)
CALCIUM SERPL-MCNC: 9.9 MG/DL — SIGNIFICANT CHANGE UP (ref 8.4–10.5)
CHLORIDE SERPL-SCNC: 99 MMOL/L — SIGNIFICANT CHANGE UP (ref 96–108)
CO2 SERPL-SCNC: 30 MMOL/L — SIGNIFICANT CHANGE UP (ref 22–31)
CREAT SERPL-MCNC: 0.7 MG/DL — SIGNIFICANT CHANGE UP (ref 0.5–1.3)
EGFR: 91 ML/MIN/1.73M2 — SIGNIFICANT CHANGE UP
GLUCOSE SERPL-MCNC: 134 MG/DL — HIGH (ref 70–99)
HCT VFR BLD CALC: 36.3 % — SIGNIFICANT CHANGE UP (ref 34.5–45)
HCT VFR BLD CALC: 37.4 % — SIGNIFICANT CHANGE UP (ref 34.5–45)
HCT VFR BLD CALC: 38 % — SIGNIFICANT CHANGE UP (ref 34.5–45)
HGB BLD-MCNC: 11.2 G/DL — LOW (ref 11.5–15.5)
HGB BLD-MCNC: 11.5 G/DL — SIGNIFICANT CHANGE UP (ref 11.5–15.5)
HGB BLD-MCNC: 11.7 G/DL — SIGNIFICANT CHANGE UP (ref 11.5–15.5)
MCHC RBC-ENTMCNC: 30.2 PG — SIGNIFICANT CHANGE UP (ref 27–34)
MCHC RBC-ENTMCNC: 30.3 PG — SIGNIFICANT CHANGE UP (ref 27–34)
MCHC RBC-ENTMCNC: 30.6 PG — SIGNIFICANT CHANGE UP (ref 27–34)
MCHC RBC-ENTMCNC: 30.7 GM/DL — LOW (ref 32–36)
MCHC RBC-ENTMCNC: 30.8 GM/DL — LOW (ref 32–36)
MCHC RBC-ENTMCNC: 30.9 GM/DL — LOW (ref 32–36)
MCV RBC AUTO: 97.8 FL — SIGNIFICANT CHANGE UP (ref 80–100)
MCV RBC AUTO: 98.4 FL — SIGNIFICANT CHANGE UP (ref 80–100)
MCV RBC AUTO: 99.5 FL — SIGNIFICANT CHANGE UP (ref 80–100)
NRBC # BLD: 0 /100 WBCS — SIGNIFICANT CHANGE UP (ref 0–0)
PLATELET # BLD AUTO: 172 K/UL — SIGNIFICANT CHANGE UP (ref 150–400)
PLATELET # BLD AUTO: 173 K/UL — SIGNIFICANT CHANGE UP (ref 150–400)
PLATELET # BLD AUTO: 183 K/UL — SIGNIFICANT CHANGE UP (ref 150–400)
POTASSIUM SERPL-MCNC: 4.1 MMOL/L — SIGNIFICANT CHANGE UP (ref 3.5–5.3)
POTASSIUM SERPL-SCNC: 4.1 MMOL/L — SIGNIFICANT CHANGE UP (ref 3.5–5.3)
RBC # BLD: 3.71 M/UL — LOW (ref 3.8–5.2)
RBC # BLD: 3.8 M/UL — SIGNIFICANT CHANGE UP (ref 3.8–5.2)
RBC # BLD: 3.82 M/UL — SIGNIFICANT CHANGE UP (ref 3.8–5.2)
RBC # FLD: 15.1 % — HIGH (ref 10.3–14.5)
RBC # FLD: 15.1 % — HIGH (ref 10.3–14.5)
RBC # FLD: 15.3 % — HIGH (ref 10.3–14.5)
SODIUM SERPL-SCNC: 141 MMOL/L — SIGNIFICANT CHANGE UP (ref 135–145)
TROPONIN T, HIGH SENSITIVITY RESULT: 34 NG/L — SIGNIFICANT CHANGE UP (ref 0–51)
TSH SERPL-MCNC: 4.91 UIU/ML — HIGH (ref 0.27–4.2)
WBC # BLD: 10.09 K/UL — SIGNIFICANT CHANGE UP (ref 3.8–10.5)
WBC # BLD: 11.6 K/UL — HIGH (ref 3.8–10.5)
WBC # BLD: 8.6 K/UL — SIGNIFICANT CHANGE UP (ref 3.8–10.5)
WBC # FLD AUTO: 10.09 K/UL — SIGNIFICANT CHANGE UP (ref 3.8–10.5)
WBC # FLD AUTO: 11.6 K/UL — HIGH (ref 3.8–10.5)
WBC # FLD AUTO: 8.6 K/UL — SIGNIFICANT CHANGE UP (ref 3.8–10.5)

## 2023-03-22 PROCEDURE — 99223 1ST HOSP IP/OBS HIGH 75: CPT

## 2023-03-22 PROCEDURE — 93308 TTE F-UP OR LMTD: CPT | Mod: 26

## 2023-03-22 PROCEDURE — 93321 DOPPLER ECHO F-UP/LMTD STD: CPT | Mod: 26

## 2023-03-22 RX ORDER — CHLORHEXIDINE GLUCONATE 213 G/1000ML
1 SOLUTION TOPICAL
Refills: 0 | Status: DISCONTINUED | OUTPATIENT
Start: 2023-03-22 | End: 2023-03-24

## 2023-03-22 RX ORDER — AMLODIPINE BESYLATE 2.5 MG/1
1 TABLET ORAL
Qty: 0 | Refills: 0 | DISCHARGE

## 2023-03-22 RX ORDER — HEPARIN SODIUM 5000 [USP'U]/ML
1800 INJECTION INTRAVENOUS; SUBCUTANEOUS
Qty: 25000 | Refills: 0 | Status: DISCONTINUED | OUTPATIENT
Start: 2023-03-22 | End: 2023-03-22

## 2023-03-22 RX ORDER — PENTOXIFYLLINE 400 MG
400 TABLET, EXTENDED RELEASE ORAL DAILY
Refills: 0 | Status: DISCONTINUED | OUTPATIENT
Start: 2023-03-22 | End: 2023-03-24

## 2023-03-22 RX ORDER — OLANZAPINE 15 MG/1
10 TABLET, FILM COATED ORAL DAILY
Refills: 0 | Status: DISCONTINUED | OUTPATIENT
Start: 2023-03-22 | End: 2023-03-24

## 2023-03-22 RX ORDER — PROPRANOLOL HCL 160 MG
1 CAPSULE, EXTENDED RELEASE 24HR ORAL
Qty: 0 | Refills: 0 | DISCHARGE

## 2023-03-22 RX ORDER — HEPARIN SODIUM 5000 [USP'U]/ML
5000 INJECTION INTRAVENOUS; SUBCUTANEOUS EVERY 8 HOURS
Refills: 0 | Status: DISCONTINUED | OUTPATIENT
Start: 2023-03-22 | End: 2023-03-24

## 2023-03-22 RX ORDER — OLANZAPINE 15 MG/1
1 TABLET, FILM COATED ORAL
Qty: 0 | Refills: 0 | DISCHARGE

## 2023-03-22 RX ADMIN — Medication 125 MICROGRAM(S): at 05:41

## 2023-03-22 RX ADMIN — LOSARTAN POTASSIUM 100 MILLIGRAM(S): 100 TABLET, FILM COATED ORAL at 05:41

## 2023-03-22 RX ADMIN — OLANZAPINE 20 MILLIGRAM(S): 15 TABLET, FILM COATED ORAL at 00:28

## 2023-03-22 RX ADMIN — Medication 5 MILLIGRAM(S): at 05:42

## 2023-03-22 RX ADMIN — HEPARIN SODIUM 1800 UNIT(S)/HR: 5000 INJECTION INTRAVENOUS; SUBCUTANEOUS at 03:31

## 2023-03-22 RX ADMIN — Medication 75 MILLIGRAM(S): at 05:41

## 2023-03-22 RX ADMIN — Medication 25 MILLIGRAM(S): at 05:42

## 2023-03-22 RX ADMIN — Medication 100 MILLIGRAM(S): at 22:01

## 2023-03-22 RX ADMIN — Medication 3 MILLILITER(S): at 17:17

## 2023-03-22 RX ADMIN — Medication 100 MILLIGRAM(S): at 00:30

## 2023-03-22 RX ADMIN — Medication 40 MILLIGRAM(S): at 05:41

## 2023-03-22 RX ADMIN — MONTELUKAST 10 MILLIGRAM(S): 4 TABLET, CHEWABLE ORAL at 13:35

## 2023-03-22 RX ADMIN — HEPARIN SODIUM 5000 UNIT(S): 5000 INJECTION INTRAVENOUS; SUBCUTANEOUS at 22:01

## 2023-03-22 RX ADMIN — Medication 25 MILLIGRAM(S): at 17:19

## 2023-03-22 RX ADMIN — CHLORHEXIDINE GLUCONATE 1 APPLICATION(S): 213 SOLUTION TOPICAL at 17:23

## 2023-03-22 RX ADMIN — AMLODIPINE BESYLATE 5 MILLIGRAM(S): 2.5 TABLET ORAL at 05:41

## 2023-03-22 RX ADMIN — Medication 81 MILLIGRAM(S): at 13:34

## 2023-03-22 RX ADMIN — Medication 100 MILLIGRAM(S): at 05:40

## 2023-03-22 RX ADMIN — Medication 10 MILLIGRAM(S): at 09:31

## 2023-03-22 RX ADMIN — HEPARIN SODIUM 0 UNIT(S)/HR: 5000 INJECTION INTRAVENOUS; SUBCUTANEOUS at 02:22

## 2023-03-22 RX ADMIN — TIOTROPIUM BROMIDE 2 PUFF(S): 18 CAPSULE ORAL; RESPIRATORY (INHALATION) at 13:54

## 2023-03-22 RX ADMIN — Medication 3 MILLIGRAM(S): at 22:09

## 2023-03-22 RX ADMIN — HEPARIN SODIUM 5000 UNIT(S): 5000 INJECTION INTRAVENOUS; SUBCUTANEOUS at 14:04

## 2023-03-22 RX ADMIN — GABAPENTIN 100 MILLIGRAM(S): 400 CAPSULE ORAL at 05:41

## 2023-03-22 RX ADMIN — ATORVASTATIN CALCIUM 40 MILLIGRAM(S): 80 TABLET, FILM COATED ORAL at 22:01

## 2023-03-22 RX ADMIN — DULOXETINE HYDROCHLORIDE 60 MILLIGRAM(S): 30 CAPSULE, DELAYED RELEASE ORAL at 13:34

## 2023-03-22 RX ADMIN — Medication 100 MILLIGRAM(S): at 13:50

## 2023-03-22 RX ADMIN — HEPARIN SODIUM 1800 UNIT(S)/HR: 5000 INJECTION INTRAVENOUS; SUBCUTANEOUS at 05:37

## 2023-03-22 RX ADMIN — GABAPENTIN 100 MILLIGRAM(S): 400 CAPSULE ORAL at 17:18

## 2023-03-22 RX ADMIN — Medication 75 MILLIGRAM(S): at 17:17

## 2023-03-22 RX ADMIN — Medication 3 MILLILITER(S): at 05:40

## 2023-03-22 NOTE — PHYSICAL THERAPY INITIAL EVALUATION ADULT - ADDITIONAL COMMENTS
Pt lives at home with spouse, +no stairs to enter, +apt with elevator, pt is mostly bed/WC bound, Owns +electric bed, +WC, +rolling walker, +commode. +HHA 9.5 hrs/7days, assist for ADLs and sponge bathing.

## 2023-03-22 NOTE — CONSULT NOTE ADULT - ATTENDING COMMENTS
Acute lipodermatosclerosis as above, no e/o infection. Topical steroids may help. Ultimately this is a difficult condition to treat. Some success can be seen with trental. Can start low dose 400mg daily and increase outpatient. Will need full med interaction check. It may increase bioactivity of antihypertensives so would monitor BP closely.

## 2023-03-22 NOTE — PHYSICAL THERAPY INITIAL EVALUATION ADULT - ACTIVE RANGE OF MOTION EXAMINATION, REHAB EVAL
BUE AROM WFL, BLE AAROM/PROM as tolerated +b/l swelling/Left UE Active ROM was WFL (within functional limits)/Right UE Active ROM was WFL (within functional limits)

## 2023-03-22 NOTE — PROGRESS NOTE ADULT - ASSESSMENT
72 f with    Cellulitis legs  - panculture  - antibiotic  - ID evaluation dr. King noted   - Dermatology evaluation    A Flutter vs EKG with error  - DC Heparin  - telemetry with SR  - TSH  - Echo  - Cardiology evaluation Dr. Salcedo follow    Anxiety / Depression   - control    Asthma   - nebs    Breast cancer in situ, left   - stable    COPD (chronic obstructive pulmonary disease)   - nebs    Graves disease   - follow TSH    Hyperlipidemia   - stable    Hypertension   - control    Hypothyroid   - continue supplementation    Kidney cancer, primary, with metastasis from kidney to other site, left LEft sided- s/p nephrectomy only- no chemo or RT  - stable    Obesity   - Nutrition education    DVT prophylaxis  - AC    Nathan Mora MD phone [phone]   72 f with    Cellulitis legs  - panculture  - antibiotic  - ID evaluation dr. King noted   - Dermatology evaluation    A Flutter vs EKG with error  - DC Heparin  - telemetry with SR  - TSH  - Echo  - Cardiology evaluation Dr. Salcedo follow    Tremor  - Neurology evaluation     Anxiety / Depression   - control    Asthma   - nebs    Breast cancer in situ, left   - stable    COPD (chronic obstructive pulmonary disease)   - nebs    Graves disease   - follow TSH    Hyperlipidemia   - stable    Hypertension   - control    Hypothyroid   - continue supplementation    Kidney cancer, primary, with metastasis from kidney to other site, left LEft sided- s/p nephrectomy only- no chemo or RT  - stable    Obesity   - Nutrition education    DVT prophylaxis  - AC    Nathan Mora MD phone [phone]

## 2023-03-22 NOTE — PHYSICAL THERAPY INITIAL EVALUATION ADULT - PERTINENT HX OF CURRENT PROBLEM, REHAB EVAL
Pt is a 72 year-old-female admitted to [facility] on 3/21/23 with history of COPD (not on home O2), hx of DVT not on AC, Graves disease, HLD, HTN, hypothyroidism, breast cancer in situ, parathyroidectomy, kidney cancer w/mets s/p L partial nephrectomy (not on chemo/RT), obesity, tremors, and chronic lymphedema now presenting to the ED via EMS for worsening b/l lower extremity swelling x 1 month. Pt with decreased ambulatory status during this time frame, uses walker at baseline to ambulate. Has home health aide daily 9.5 hours. Pt compliant with lasix medication. Pt brought to ED given worsening leg swelling and erythema. States was seen last time for similar presentation. Arrives on home O2 (2L). Deneis chest pain, sob, abdominal pain, nausea/vomiting, fever/chills. Did not take lasix medication today. Hospital course: VA duplex BLE: No evidence of deep venous thrombosis in either lower extremity. CXR: Mild pulmonary vascular congestion and small bilateral pleural effusions. PTT >200, H/H stable 11.5/37,  Pt received 10,000 Unit Bolus from ED, Hep gtt held 1 hr decreased from 24U to 18 Units; +cellulitis BLE; on ancef, lasix; aflutter AC with heparin gtt, trop neg x3

## 2023-03-22 NOTE — PATIENT PROFILE ADULT - FALL HARM RISK - DEVICES
PROCEDURES:  Open insertion of intraluminal device into right subclavian vein 13-Apr-2022 15:03:34  Estrada Wade  Diagnostic laparoscopy 13-Apr-2022 15:03:51  Estrada Wade   PROCEDURES:  VATS, with lung wedge resection 13-Apr-2022 12:29:44  Pa Pollard   Wheelchair

## 2023-03-22 NOTE — CONSULT NOTE ADULT - SUBJECTIVE AND OBJECTIVE BOX
CHIEF COMPLAINT: edema    HISTORY OF PRESENT ILLNESS:  72 year-old-female with history of COPD (not on home O2), hx of DVT not on AC, Graves disease, HLD, HTN, hypothyroidism, breast cancer in situ, parathyroidectomy, kidney cancer w/mets s/p L partial nephrectomy (not on chemo/RT), obesity, tremors, and chronic lymphedema now presenting to the ED via EMS for worsening b/l lower extremity swelling x 1 month. Patient with decreased ambulatory status during this time frame, uses walker at baseline to ambulate. Has home health aide daily 9.5 hours. Pt compliant with lasix medication. Pt brought to ED given worsening leg swelling and erythema. States was seen last time for similar presentation. Arrives on home O2 (2L). Deneis chest pain, sob, abdominal pain, nausea/vomiting, fever/chills. Did not take lasix medication today.         Allergies    Grapes (Hives)  No Known Drug Allergies  Peaches (Hives)  shellfish (Hives)    Intolerances    	    MEDICATIONS:  amLODIPine   Tablet 5 milliGRAM(s) Oral daily  aspirin enteric coated 81 milliGRAM(s) Oral daily  furosemide    Tablet 40 milliGRAM(s) Oral daily  heparin   Injectable 63496 Unit(s) IV Push every 6 hours PRN  heparin   Injectable 5000 Unit(s) IV Push every 6 hours PRN  heparin  Infusion. 1800 Unit(s)/Hr IV Continuous <Continuous>  hydrALAZINE 75 milliGRAM(s) Oral two times a day  losartan 100 milliGRAM(s) Oral daily  metoprolol tartrate 25 milliGRAM(s) Oral two times a day    ceFAZolin   IVPB      ceFAZolin   IVPB 2000 milliGRAM(s) IV Intermittent every 8 hours    albuterol/ipratropium for Nebulization 3 milliLiter(s) Nebulizer every 6 hours  montelukast 10 milliGRAM(s) Oral daily  tiotropium 2.5 MICROgram(s) Inhaler 2 Puff(s) Inhalation daily    acetaminophen     Tablet .. 650 milliGRAM(s) Oral every 6 hours PRN  baclofen 5 milliGRAM(s) Oral every 8 hours PRN  busPIRone 10 milliGRAM(s) Oral <User Schedule>  DULoxetine 60 milliGRAM(s) Oral daily  gabapentin 100 milliGRAM(s) Oral two times a day  melatonin 3 milliGRAM(s) Oral at bedtime PRN  OLANZapine 20 milliGRAM(s) Oral at bedtime  oxycodone    5 mG/acetaminophen 325 mG 1 Tablet(s) Oral every 6 hours PRN      atorvastatin 40 milliGRAM(s) Oral at bedtime  levothyroxine 125 MICROGram(s) Oral daily  predniSONE   Tablet 5 milliGRAM(s) Oral daily        PAST MEDICAL & SURGICAL HISTORY:  Hypertension      Hyperlipidemia      Anxiety      Graves disease      Kidney cancer, primary, with metastasis from kidney to other site, left  LEft sided- s/p nephrectomy only- no chemo or RT      Breast cancer in situ, left      COPD (chronic obstructive pulmonary disease)      Hypothyroid      DVT (deep venous thrombosis)  history of- not presently on A/C      Obesity      Sleep apnea      Asthma      S/P cholecystectomy      S/p nephrectomy  left      S/P breast biopsy  left      History of pelvic surgery  Pelvic Sling      History of eye surgery  7 surgeries secondary to grave&#x27;s disease      History of carpal tunnel release  right wrist      History of parathyroidectomy      S/P laminectomy  now bed and wheelchair ridden with severe pain          FAMILY HISTORY:  Family history of myocardial infarction  mother  at age 67 and father at age 67 of MI&#x27;s    Family history of hypertension  mother and father    Family history of diabetes mellitus (Sibling)  siblings    Family history of heart disease (Sibling)  brother has a PPM    Family history of thyroid cancer (Child)  daughter has thyroid cancer        SOCIAL HISTORY:    non smoker. indep in adl      REVIEW OF SYSTEMS:  See HPI, otherwise complete 10 point review of systems negative    [ ] All others negative	      PHYSICAL EXAM:  T(C): 36.4 (23 @ 05:05), Max: 36.8 (23 @ 10:36)  HR: 96 (23 @ 05:05) (80 - 96)  BP: 144/99 (23 @ 05:05) (109/56 - 144/99)  RR: 19 (23 @ 05:05) (19 - 20)  SpO2: 95% (23 @ 05:05) (95% - 97%)  Wt(kg): --  I&O's Summary    21 Mar 2023 07:01  -  22 Mar 2023 07:00  --------------------------------------------------------  IN: 0 mL / OUT: 350 mL / NET: -350 mL        Appearance: No Acute Distress	  HEENT:  Normal oral mucosa, PERRL, EOMI	  Cardiovascular: Normal S1 S2, No JVD, No murmurs/rubs/gallops  Respiratory: Lungs clear to auscultation bilaterally  Gastrointestinal:  Soft, Non-tender, + BS	  Skin: No rashes, No ecchymoses, No cyanosis	  Neurologic: Non-focal  Extremities: No clubbing, cyanosis + edema  Vascular: Peripheral pulses palpable 2+ bilaterally  Psychiatry: A & O x 3, Mood & affect appropriate    Laboratory Data:	 	    CBC Full  -  ( 22 Mar 2023 07:03 )  WBC Count : 8.60 K/uL  Hemoglobin : 11.2 g/dL  Hematocrit : 36.3 %  Platelet Count - Automated : 173 K/uL  Mean Cell Volume : 97.8 fl  Mean Cell Hemoglobin : 30.2 pg  Mean Cell Hemoglobin Concentration : 30.9 gm/dL  Auto Neutrophil # : x  Auto Lymphocyte # : x  Auto Monocyte # : x  Auto Eosinophil # : x  Auto Basophil # : x  Auto Neutrophil % : x  Auto Lymphocyte % : x  Auto Monocyte % : x  Auto Eosinophil % : x  Auto Basophil % : x        141  |  99  |  15  ----------------------------<  134<H>  4.1   |  30  |  0.70      143  |  103  |  18  ----------------------------<  128<H>  5.0   |  28  |  0.67    Ca    9.9      22 Mar 2023 07:03  Ca    9.8      21 Mar 2023 11:47    TPro  6.4  /  Alb  3.6  /  TBili  0.6  /  DBili  x   /  AST  18  /  ALT  14  /  AlkPhos  103        proBNP:   Lipid Profile:   HgA1c:   TSH: Thyroid Stimulating Hormone, Serum: 2.87 uIU/mL ( @ 11:47)        CARDIAC MARKERS:            Interpretation of Telemetry: 	    ECG:  	  RADIOLOGY:  OTHER: 	    PREVIOUS DIAGNOSTIC TESTING:    [ ] Echocardiogram:  [ ] Catheterization:  [ ] Stress Test:  	    Assessment:  72 year-old-female with history of COPD (not on home O2), hx of DVT not on AC, Graves disease, HLD, HTN, hypothyroidism, breast cancer in situ, parathyroidectomy, kidney cancer w/mets s/p L partial nephrectomy (not on chemo/RT), obesity, tremors, and chronic lymphedema now presenting to the ED via EMS for worsening b/l lower extremity swelling x 1 month    Recs:  cardiac stable  edema likely 2/2 venous insufficiency, doubt volume overload/ADHF  cw diuretics to maintain euvolemic  rec compression socks, ace wraps and leg elevation  prev lower ext venous duplex neg for dvt  ? report of aflutter on ecg - patients chart was not available and no ecg in chart. nsr on tele. suspect artifact on ecg due to baseline tremors. can hold AC for now  care per medicine      Advanced care planning forms were discussed. Code status including forceful chest compressions, defibrillation and intubation were discussed. The risks benefits and alternatives to pertinent cardiac procedures and interventions were discussed in detail and all questions were answered. Duration: 15 minutes.  Greater than 90 minutes spent on total encounter; more than 50% of the visit was spent counseling and/or coordinating care by the attending physician.   	  Juan Salcedo MD   Cardiovascular Diseases  [phone]

## 2023-03-22 NOTE — CONSULT NOTE ADULT - ASSESSMENT
Assessment:     Plan:       The patient's chart was reviewed in addition to being seen and examined at bedside with the dermatology attending Dr. Fletcher.  Recommendations were communicated with the primary team.  Please page [phone] with a 10-digit call-back number for further related questions.    Thank you for allowing us to participate in the care of this patient.  Please re-consult Dermatology for any new or worsening developments.    Latesha Naik MD, SHANNON  Resident Physician, PGY-2  [facility] Dermatology  Pager: [phone]  Office: [phone] Assessment: Favor acute lipodermatosclerosis, b/l lower extremities in the setting of chronic venous stasis and edema. Treatment involves minimizing lower extremity edema     Plan:   - Recommend leg elevation and compression  - Recommend evaluating other measures to relieve lower extremity edema including consideration of diuretics   - Recommend starting pentoxifylline 400mg daily, will plan to titrate dose in the outpatient setting   - Recommend starting clobetasol 0.05% ointment BID to affected areas for up to 2 weeks on, then 1 week off  - Recommend outpatient follow up with Dr. Fletcher at our clinic located at [address] [place] [address] [address], [place] ([phone]). Dermatology to coordinate appointment.    The patient's chart was reviewed in addition to being seen and examined at bedside with the dermatology attending Dr. Fletcher.  Recommendations were communicated with the primary team.  Please page [phone] with a 10-digit call-back number for further related questions.    Thank you for allowing us to participate in the care of this patient.  Please re-consult Dermatology for any new or worsening developments.    Latesha Naik MD, SHANNON  Resident Physician, PGY-2  [facility] Dermatology  Pager: [phone]  Office: [phone]

## 2023-03-22 NOTE — PROVIDER CONTACT NOTE (CRITICAL VALUE NOTIFICATION) - ACTION/TREATMENT ORDERED:
Heparin drip d/c' ed.
Provider aware, will place new order for heparin gtt starting at 18 mL/hr. Currently at 24 mL/hr, on hold for one hour per nomogram.

## 2023-03-22 NOTE — CONSULT NOTE ADULT - SUBJECTIVE AND OBJECTIVE BOX
HPI:  72 year-old-female with history of COPD (not on home O2), hx of DVT not on AC, Graves disease, HLD, HTN, hypothyroidism, breast cancer in situ, parathyroidectomy, kidney cancer w/mets s/p L partial nephrectomy (not on chemo/RT), obesity, tremors, and chronic lymphedema now presenting to the ED via EMS for worsening b/l lower extremity swelling x 1 month. Patient with decreased ambulatory status during this time frame, uses walker at baseline to ambulate. Has home health aide daily 9.5 hours. Pt compliant with lasix medication. Pt brought to ED given worsening leg swelling and erythema. States was seen last time for similar presentation. Arrives on home O2 (2L). Deneis chest pain, sob, abdominal pain, nausea/vomiting, fever/chills. Did not take lasix medication today.  (21 Mar 2023 16:52)    Dermatology consulted for a rash on the b/l lower extremities, L > R. Pt has a hx chronic lymphedema and venous stasis, presented to the hospital for worsening leg swelling and erythema (ongoing at least one month). Pt was diagnosed with presumed cellulitis and admitted for management, started on Ancef. Evaluated by ID who does not feel diagnosis is c/w cellulitis. Requesting dermatology evaluation.       PAST MEDICAL & SURGICAL HISTORY:  Hypertension      Hyperlipidemia      Anxiety      Graves disease      Kidney cancer, primary, with metastasis from kidney to other site, left  LEft sided- s/p nephrectomy only- no chemo or RT      Breast cancer in situ, left      COPD (chronic obstructive pulmonary disease)      Hypothyroid      DVT (deep venous thrombosis)  history of- not presently on A/C      Obesity      Sleep apnea      Asthma      S/P cholecystectomy      S/p nephrectomy  left      S/P breast biopsy  left      History of pelvic surgery  Pelvic Sling      History of eye surgery  7 surgeries secondary to grave&#x27;s disease      History of carpal tunnel release  right wrist      History of parathyroidectomy      S/P laminectomy  now bed and wheelchair ridden with severe pain          Review of Systems:  REVIEW OF SYSTEMS      General: no fevers/chills, no lethargy	    Skin/Breast: see HPI  	  Ophthalmologic: no eye pain or change in vision  	  ENMT: no dysphagia or change in hearing    Respiratory and Thorax: no SOB or cough  	  Cardiovascular: no palpitations or chest pain    Gastrointestinal: no abdominal pain or blood in stool     Genitourinary: no dysuria or frequency    Musculoskeletal: no joint pains or weakness	    Neurological:no weakness, numbness , or tingling    MEDICATIONS  (STANDING):  albuterol/ipratropium for Nebulization 3 milliLiter(s) Nebulizer every 6 hours  amLODIPine   Tablet 5 milliGRAM(s) Oral daily  aspirin enteric coated 81 milliGRAM(s) Oral daily  atorvastatin 40 milliGRAM(s) Oral at bedtime  busPIRone 10 milliGRAM(s) Oral <User Schedule>  ceFAZolin   IVPB      ceFAZolin   IVPB 2000 milliGRAM(s) IV Intermittent every 8 hours  chlorhexidine 2% Cloths 1 Application(s) Topical <User Schedule>  DULoxetine 60 milliGRAM(s) Oral daily  furosemide    Tablet 40 milliGRAM(s) Oral daily  gabapentin 100 milliGRAM(s) Oral two times a day  heparin   Injectable 5000 Unit(s) SubCutaneous every 8 hours  hydrALAZINE 75 milliGRAM(s) Oral two times a day  levothyroxine 125 MICROGram(s) Oral daily  losartan 100 milliGRAM(s) Oral daily  metoprolol tartrate 25 milliGRAM(s) Oral two times a day  montelukast 10 milliGRAM(s) Oral daily  OLANZapine 10 milliGRAM(s) Oral daily  predniSONE   Tablet 5 milliGRAM(s) Oral daily  tiotropium 2.5 MICROgram(s) Inhaler 2 Puff(s) Inhalation daily    ALLERGIES: Grapes  No Known Drug Allergies  Peaches  shellfish        SOCIAL HISTORY:  ____________________________________  Social History:  Social History:    Marital Status:  (  x )    (   ) Single    (   )    (  )   Occupation:   Lives with: (  ) alone  (  ) children   ( x ) spouse   (  ) parents  (  ) other    Substance Use (street drugs): (x  ) never used  (  ) other:  Tobacco Usage:  (   ) never smoked   ( x  ) former smoker   (   ) current smoker  (     ) pack years  (        ) last cigarette date  Alcohol Usage: denies    (     ) Advanced Directives: (     ) None    (      ) DNR    (     ) DNI    (     ) Health Care Proxy:  FAMILY HISTORY:  Family history of myocardial infarction  mother  at age 67 and father at age 67 of MI&#x27;s    Family history of hypertension  mother and father    Family history of diabetes mellitus (Sibling)  siblings    Family history of heart disease (Sibling)  brother has a PPM    Family history of thyroid cancer (Child)  daughter has thyroid cancer          VITAL SIGNS LAST 24 HOURS:  T(F): 97.6 ( @ 12:37), Max: 97.8 ( @ 19:00)  HR: 81 ( @ 12:37) (81 - 96)  BP: 131/81 ( @ 12:37) (109/56 - 144/99)  RR: 18 ( @ 12:37) (18 - 20)    PHYSICAL EXAM:     The patient was alert and oriented X 3, well nourished, and in no  apparent distress.  OP showed no ulcerations  There was no visible lymphadenopathy.  Conjunctiva were non injected  There was no clubbing or edema of extremities.  The scalp, hair, face, eyebrows, lips, OP, neck, chest, back,   extremities X 4, nails were examined.  There was no hyperhidrosis or bromhidrosis.    Of note on skin exam:     ____________________________________    LABS:                        11.7   11.60 )-----------( 172      ( 22 Mar 2023 09:55 )             38.0     -    141  |  99  |  15  ----------------------------<  134<H>  4.1   |  30  |  0.70    Ca    9.9      22 Mar 2023 07:03    TPro  6.4  /  Alb  3.6  /  TBili  0.6  /  DBili  x   /  AST  18  /  ALT  14  /  AlkPhos  103  03-21    PT/INR - ( 21 Mar 2023 17:38 )   PT: 13.5 sec;   INR: 1.16 ratio         PTT - ( 22 Mar 2023 09:55 )  PTT:>200.0 sec   HPI:  72 year-old-female with history of COPD (not on home O2), hx of DVT not on AC, Graves disease, HLD, HTN, hypothyroidism, breast cancer in situ, parathyroidectomy, kidney cancer w/mets s/p L partial nephrectomy (not on chemo/RT), obesity, tremors, and chronic lymphedema now presenting to the ED via EMS for worsening b/l lower extremity swelling x 1 month. Patient with decreased ambulatory status during this time frame, uses walker at baseline to ambulate. Has home health aide daily 9.5 hours. Pt compliant with lasix medication. Pt brought to ED given worsening leg swelling and erythema. States was seen last time for similar presentation. Arrives on home O2 (2L). Deneis chest pain, sob, abdominal pain, nausea/vomiting, fever/chills. Did not take lasix medication today.  (21 Mar 2023 16:52)    Dermatology consulted for a rash on the b/l lower extremities, L > R. Pt has a hx chronic lymphedema and venous stasis, presented to the hospital for worsening leg swelling and erythema (ongoing at least one month). Pt was diagnosed with presumed cellulitis and admitted for management, started on Ancef. Evaluated by ID who does not feel diagnosis is c/w cellulitis. Requesting dermatology evaluation.       PAST MEDICAL & SURGICAL HISTORY:  Hypertension      Hyperlipidemia      Anxiety      Graves disease      Kidney cancer, primary, with metastasis from kidney to other site, left  LEft sided- s/p nephrectomy only- no chemo or RT      Breast cancer in situ, left      COPD (chronic obstructive pulmonary disease)      Hypothyroid      DVT (deep venous thrombosis)  history of- not presently on A/C      Obesity      Sleep apnea      Asthma      S/P cholecystectomy      S/p nephrectomy  left      S/P breast biopsy  left      History of pelvic surgery  Pelvic Sling      History of eye surgery  7 surgeries secondary to grave&#x27;s disease      History of carpal tunnel release  right wrist      History of parathyroidectomy      S/P laminectomy  now bed and wheelchair ridden with severe pain          Review of Systems:  REVIEW OF SYSTEMS      General: no fevers/chills, no lethargy	    Skin/Breast: see HPI  	  Ophthalmologic: no eye pain or change in vision  	  ENMT: no dysphagia or change in hearing    Respiratory and Thorax: no SOB or cough  	  Cardiovascular: no palpitations or chest pain    Gastrointestinal: no abdominal pain or blood in stool     Genitourinary: no dysuria or frequency    Musculoskeletal: no joint pains or weakness	    Neurological:no weakness, numbness , or tingling    MEDICATIONS  (STANDING):  albuterol/ipratropium for Nebulization 3 milliLiter(s) Nebulizer every 6 hours  amLODIPine   Tablet 5 milliGRAM(s) Oral daily  aspirin enteric coated 81 milliGRAM(s) Oral daily  atorvastatin 40 milliGRAM(s) Oral at bedtime  busPIRone 10 milliGRAM(s) Oral <User Schedule>  ceFAZolin   IVPB      ceFAZolin   IVPB 2000 milliGRAM(s) IV Intermittent every 8 hours  chlorhexidine 2% Cloths 1 Application(s) Topical <User Schedule>  DULoxetine 60 milliGRAM(s) Oral daily  furosemide    Tablet 40 milliGRAM(s) Oral daily  gabapentin 100 milliGRAM(s) Oral two times a day  heparin   Injectable 5000 Unit(s) SubCutaneous every 8 hours  hydrALAZINE 75 milliGRAM(s) Oral two times a day  levothyroxine 125 MICROGram(s) Oral daily  losartan 100 milliGRAM(s) Oral daily  metoprolol tartrate 25 milliGRAM(s) Oral two times a day  montelukast 10 milliGRAM(s) Oral daily  OLANZapine 10 milliGRAM(s) Oral daily  predniSONE   Tablet 5 milliGRAM(s) Oral daily  tiotropium 2.5 MICROgram(s) Inhaler 2 Puff(s) Inhalation daily    ALLERGIES: Grapes  No Known Drug Allergies  Peaches  shellfish        SOCIAL HISTORY:  ____________________________________  Social History:  Social History:    Marital Status:  (  x )    (   ) Single    (   )    (  )   Occupation:   Lives with: (  ) alone  (  ) children   ( x ) spouse   (  ) parents  (  ) other    Substance Use (street drugs): (x  ) never used  (  ) other:  Tobacco Usage:  (   ) never smoked   ( x  ) former smoker   (   ) current smoker  (     ) pack years  (        ) last cigarette date  Alcohol Usage: denies    (     ) Advanced Directives: (     ) None    (      ) DNR    (     ) DNI    (     ) Health Care Proxy:  FAMILY HISTORY:  Family history of myocardial infarction  mother  at age 67 and father at age 67 of MI&#x27;s    Family history of hypertension  mother and father    Family history of diabetes mellitus (Sibling)  siblings    Family history of heart disease (Sibling)  brother has a PPM    Family history of thyroid cancer (Child)  daughter has thyroid cancer          VITAL SIGNS LAST 24 HOURS:  T(F): 97.6 ( @ 12:37), Max: 97.8 ( @ 19:00)  HR: 81 ( @ 12:37) (81 - 96)  BP: 131/81 ( @ 12:37) (109/56 - 144/99)  RR: 18 ( @ 12:37) (18 - 20)    PHYSICAL EXAM:     The patient was alert and oriented X 3, well nourished, and in no  apparent distress.  OP showed no ulcerations  There was no visible lymphadenopathy.  Conjunctiva were non injected  There was no clubbing or edema of extremities.  The scalp, hair, face, eyebrows, lips, OP, neck, chest, back,   extremities X 4, nails were examined.  There was no hyperhidrosis or bromhidrosis.    Of note on skin exam:     ____________________________________    LABS:                        11.7   11.60 )-----------( 172      ( 22 Mar 2023 09:55 )             38.0     -    141  |  99  |  15  ----------------------------<  134<H>  4.1   |  30  |  0.70    Ca    9.9      22 Mar 2023 07:03    TPro  6.4  /  Alb  3.6  /  TBili  0.6  /  DBili  x   /  AST  18  /  ALT  14  /  AlkPhos  103  03-21    PT/INR - ( 21 Mar 2023 17:38 )   PT: 13.5 sec;   INR: 1.16 ratio         PTT - ( 22 Mar 2023 09:55 )  PTT:>200.0 sec HPI:  72 year-old-female with history of COPD (not on home O2), hx of DVT not on AC, Graves disease, HLD, HTN, hypothyroidism, breast cancer in situ, parathyroidectomy, kidney cancer w/mets s/p L partial nephrectomy (not on chemo/RT), obesity, tremors, and chronic lymphedema now presenting to the ED via EMS for worsening b/l lower extremity swelling x 1 month. Patient with decreased ambulatory status during this time frame, uses walker at baseline to ambulate. Has home health aide daily 9.5 hours. Pt compliant with lasix medication. Pt brought to ED given worsening leg swelling and erythema. States was seen last time for similar presentation. Arrives on home O2 (2L). Deneis chest pain, sob, abdominal pain, nausea/vomiting, fever/chills. Did not take lasix medication today.  (21 Mar 2023 16:52)    Dermatology consulted for a rash on the b/l lower extremities, L > R. Pt has a hx chronic lymphedema and venous stasis, presented to the hospital for worsening leg swelling and erythema, ongoing for several months to years but with recent worsening in the path one month.  Pt was diagnosed with presumed cellulitis and admitted for management, started on Ancef. Evaluated by ID who does not feel diagnosis is c/w cellulitis. Requesting dermatology evaluation.     Pt feels her legs are currently more swollen than baseline. Pt also feels her legs are more tender to palpation and more red than usual. At home, pt keeps legs elevated and wears ACE wraps for compression.    PAST MEDICAL & SURGICAL HISTORY:  Hypertension      Hyperlipidemia      Anxiety      Graves disease      Kidney cancer, primary, with metastasis from kidney to other site, left  LEft sided- s/p nephrectomy only- no chemo or RT      Breast cancer in situ, left      COPD (chronic obstructive pulmonary disease)      Hypothyroid      DVT (deep venous thrombosis)  history of- not presently on A/C      Obesity      Sleep apnea      Asthma      S/P cholecystectomy      S/p nephrectomy  left      S/P breast biopsy  left      History of pelvic surgery  Pelvic Sling      History of eye surgery  7 surgeries secondary to grave&#x27;s disease      History of carpal tunnel release  right wrist      History of parathyroidectomy      S/P laminectomy  now bed and wheelchair ridden with severe pain          Review of Systems:  REVIEW OF SYSTEMS      General: no fevers/chills, no lethargy	    Skin/Breast: see HPI  	  Ophthalmologic: no eye pain or change in vision  	  ENMT: no dysphagia or change in hearing    Respiratory and Thorax: no SOB or cough  	  Cardiovascular: no palpitations or chest pain    Gastrointestinal: no abdominal pain or blood in stool     Genitourinary: no dysuria or frequency    Musculoskeletal: no joint pains or weakness	    Neurological:no weakness, numbness , or tingling    MEDICATIONS  (STANDING):  albuterol/ipratropium for Nebulization 3 milliLiter(s) Nebulizer every 6 hours  amLODIPine   Tablet 5 milliGRAM(s) Oral daily  aspirin enteric coated 81 milliGRAM(s) Oral daily  atorvastatin 40 milliGRAM(s) Oral at bedtime  busPIRone 10 milliGRAM(s) Oral <User Schedule>  ceFAZolin   IVPB      ceFAZolin   IVPB 2000 milliGRAM(s) IV Intermittent every 8 hours  chlorhexidine 2% Cloths 1 Application(s) Topical <User Schedule>  DULoxetine 60 milliGRAM(s) Oral daily  furosemide    Tablet 40 milliGRAM(s) Oral daily  gabapentin 100 milliGRAM(s) Oral two times a day  heparin   Injectable 5000 Unit(s) SubCutaneous every 8 hours  hydrALAZINE 75 milliGRAM(s) Oral two times a day  levothyroxine 125 MICROGram(s) Oral daily  losartan 100 milliGRAM(s) Oral daily  metoprolol tartrate 25 milliGRAM(s) Oral two times a day  montelukast 10 milliGRAM(s) Oral daily  OLANZapine 10 milliGRAM(s) Oral daily  predniSONE   Tablet 5 milliGRAM(s) Oral daily  tiotropium 2.5 MICROgram(s) Inhaler 2 Puff(s) Inhalation daily    ALLERGIES: Grapes  No Known Drug Allergies  Peaches  shellfish        SOCIAL HISTORY:  ____________________________________  Social History:  Social History:    Marital Status:  (  x )    (   ) Single    (   )    (  )   Occupation:   Lives with: (  ) alone  (  ) children   ( x ) spouse   (  ) parents  (  ) other    Substance Use (street drugs): (x  ) never used  (  ) other:  Tobacco Usage:  (   ) never smoked   ( x  ) former smoker   (   ) current smoker  (     ) pack years  (        ) last cigarette date  Alcohol Usage: denies    (     ) Advanced Directives: (     ) None    (      ) DNR    (     ) DNI    (     ) Health Care Proxy:  FAMILY HISTORY:  Family history of myocardial infarction  mother  at age 67 and father at age 67 of MI&#x27;s    Family history of hypertension  mother and father    Family history of diabetes mellitus (Sibling)  siblings    Family history of heart disease (Sibling)  brother has a PPM    Family history of thyroid cancer (Child)  daughter has thyroid cancer          VITAL SIGNS LAST 24 HOURS:  T(F): 97.6 ( @ 12:37), Max: 97.8 ( @ 19:00)  HR: 81 ( @ 12:37) (81 - 96)  BP: 131/81 ( @ 12:37) (109/56 - 144/99)  RR: 18 ( @ 12:37) (18 - 20)    PHYSICAL EXAM:     The patient was alert and oriented X 3, well nourished, and in no  apparent distress.  OP showed no ulcerations  There was no visible lymphadenopathy.  Conjunctiva were non injected  There was no clubbing or edema of extremities.  The scalp, hair, face, eyebrows, lips, OP, neck, chest, back,   extremities X 4, nails were examined.  There was no hyperhidrosis or bromhidrosis.    Of note on skin exam: Erythematous plaques on the b/l lower extremities with cobble-stone appearance of overlying papulonodules. B/l LE edema   Erythema spares dependant areas on the posterior calfs   ____________________________________    LABS:                        11.7   11.60 )-----------( 172      ( 22 Mar 2023 09:55 )             38.0         141  |  99  |  15  ----------------------------<  134<H>  4.1   |  30  |  0.70    Ca    9.9      22 Mar 2023 07:03    TPro  6.4  /  Alb  3.6  /  TBili  0.6  /  DBili  x   /  AST  18  /  ALT  14  /  AlkPhos  103  03-    PT/INR - ( 21 Mar 2023 17:38 )   PT: 13.5 sec;   INR: 1.16 ratio         PTT - ( 22 Mar 2023 09:55 )  PTT:>200.0 sec

## 2023-03-22 NOTE — PROVIDER CONTACT NOTE (CRITICAL VALUE NOTIFICATION) - SITUATION
APTT > 200
aPTT greater than 200. On heparin gtt for new aflutter. Currently in NSR on cardiac monitor

## 2023-03-22 NOTE — CONSULT NOTE ADULT - SUBJECTIVE AND OBJECTIVE BOX
HPI:   Patient is a 72y female with a past history of COPD, DVT(no AC), Graves disease,HTN,HLD,partial left nephrectomy for renal cell cancer, parathyroidectomy, known metastatic renal cell cancer, who was admitted with B/L lower extremity edema and erythema.  She has been admitted in past for similar problem.  She is barely ambulatory. No fever or chills. She uses home O2 PRN.  This has been a chronic problem, has been getting worse.Cefazolin was started in ER.    REVIEW OF SYSTEMS:  All other review of systems negative (Comprehensive ROS)    PAST MEDICAL & SURGICAL HISTORY:  Hypertension      Hyperlipidemia      Anxiety      Graves disease      Kidney cancer, primary, with metastasis from kidney to other site, left  LEft sided- s/p nephrectomy only- no chemo or RT      Breast cancer in situ, left      COPD (chronic obstructive pulmonary disease)      Hypothyroid      DVT (deep venous thrombosis)  history of- not presently on A/C      Obesity      Sleep apnea      Asthma      S/P cholecystectomy      S/p nephrectomy  left      S/P breast biopsy  left      History of pelvic surgery  Pelvic Sling      History of eye surgery  7 surgeries secondary to grave&#x27;s disease      History of carpal tunnel release  right wrist      History of parathyroidectomy      S/P laminectomy  now bed and wheelchair ridden with severe pain          Allergies    Grapes (Hives)  No Known Drug Allergies  Peaches (Hives)  shellfish (Hives)    Intolerances        Antimicrobials Day #  :day 2  ceFAZolin   IVPB      ceFAZolin   IVPB 2000 milliGRAM(s) IV Intermittent every 8 hours    Other Medications:  acetaminophen     Tablet .. 650 milliGRAM(s) Oral every 6 hours PRN  albuterol/ipratropium for Nebulization 3 milliLiter(s) Nebulizer every 6 hours  amLODIPine   Tablet 5 milliGRAM(s) Oral daily  aspirin enteric coated 81 milliGRAM(s) Oral daily  atorvastatin 40 milliGRAM(s) Oral at bedtime  baclofen 5 milliGRAM(s) Oral every 8 hours PRN  busPIRone 10 milliGRAM(s) Oral <User Schedule>  DULoxetine 60 milliGRAM(s) Oral daily  furosemide    Tablet 40 milliGRAM(s) Oral daily  gabapentin 100 milliGRAM(s) Oral two times a day  heparin   Injectable 01293 Unit(s) IV Push every 6 hours PRN  heparin   Injectable 5000 Unit(s) IV Push every 6 hours PRN  heparin  Infusion. 1800 Unit(s)/Hr IV Continuous <Continuous>  hydrALAZINE 75 milliGRAM(s) Oral two times a day  levothyroxine 125 MICROGram(s) Oral daily  losartan 100 milliGRAM(s) Oral daily  melatonin 3 milliGRAM(s) Oral at bedtime PRN  metoprolol tartrate 25 milliGRAM(s) Oral two times a day  montelukast 10 milliGRAM(s) Oral daily  OLANZapine 20 milliGRAM(s) Oral at bedtime  oxycodone    5 mG/acetaminophen 325 mG 1 Tablet(s) Oral every 6 hours PRN  predniSONE   Tablet 5 milliGRAM(s) Oral daily  tiotropium 2.5 MICROgram(s) Inhaler 2 Puff(s) Inhalation daily      FAMILY HISTORY:  Family history of myocardial infarction  mother  at age 67 and father at age 67 of MI&#x27;s    Family history of hypertension  mother and father    Family history of diabetes mellitus (Sibling)  siblings    Family history of heart disease (Sibling)  brother has a PPM    Family history of thyroid cancer (Child)  daughter has thyroid cancer        SOCIAL HISTORY:  Smoking:  x   ETOH:  x   Drug Use: x        T(F): 97.6 (23 @ 05:05), Max: 98.3 (23 @ 10:36)  HR: 96 (23 @ 05:05)  BP: 144/99 (23 @ 05:05)  RR: 19 (23 @ 05:05)  SpO2: 95% (23 @ 05:05)  Wt(kg): --    PHYSICAL EXAM:  General: alert, no acute distress, on nasal oxygen  Eyes:  anicteric, no conjunctival injection, no discharge  Oropharynx: no lesions or injection 	  Neck: supple, without adenopathy  Lungs: clear to auscultation  Heart: regular rate and rhythm; no murmur, rubs or gallops  Abdomen: soft, nondistended, nontender, without mass or organomegaly  Skin: B/L venous stasis changes, left > Rt  Extremities: no clubbing, cyanosis, + edema, component of lymphedema.  Neurologic: alert, oriented, moves all extremities    LAB RESULTS:                        11.2   8.60  )-----------( 173      ( 22 Mar 2023 07:03 )             36.3         141  |  99  |  15  ----------------------------<  134<H>  4.1   |  30  |  0.70    Ca    9.9      22 Mar 2023 07:03    TPro  6.4  /  Alb  3.6  /  TBili  0.6  /  DBili  x   /  AST  18  /  ALT  14  /  AlkPhos  103  03-21    LIVER FUNCTIONS - ( 21 Mar 2023 11:47 )  Alb: 3.6 g/dL / Pro: 6.4 g/dL / ALK PHOS: 103 U/L / ALT: 14 U/L / AST: 18 U/L / GGT: x               MICROBIOLOGY:  RECENT CULTURES:        RADIOLOGY REVIEWED:  < from: [facility] Duplex Lower Ext Vein Scan, Bilat (23 @ 14:05) >  IMPRESSION:    No evidence of deep venous thrombosis in either lower extremity.    < end of copied text >  < from: Xray Chest 1 View- PORTABLE-Urgent (Xray Chest 1 View- PORTABLE-Urgent .) (23 @ 11:29) >  IMPRESSION:  Mild pulmonary vascular congestion and small bilateral pleural effusions.    --- End of Report ---    < end of copied text >

## 2023-03-22 NOTE — CONSULT NOTE ADULT - ASSESSMENT
Patient is a 72y female with a past history of COPD, DVT(no AC), Graves disease,HTN,HLD,partial left nephrectomy for renal cell cancer, parathyroidectomy, known metastatic renal cell cancer, who was admitted with B/L lower extremity edema and erythema.  She has been admitted in past for similar problem.  She is barely ambulatory. No fever or chills. She uses home O2 PRN.  This has been a chronic problem, has been getting worse.Cefazolin was started in ER.LE dopplers negative.  I think we likely are dealing with a combination of lymphedema, venous stasis, and perhaps lipodermatosclerosis as apposed to true cellulitis. She is afebrile, has a normal wbc count, and has b/l leg involvement, although clearly worse on left.  The beta strep are the typical pathogens with a non purulent cellulitis although I favor a non infectious process.  Suggest  1. Cefazolin okay for now, will likely limit course  2. Favor Derm input  3. Leg elevation.  Thanks, will follow

## 2023-03-22 NOTE — PATIENT PROFILE ADULT - FALL HARM RISK - HARM RISK INTERVENTIONS

## 2023-03-22 NOTE — PATIENT PROFILE ADULT - FUNCTIONAL ASSESSMENT - BASIC MOBILITY 6.
2-calculated by average/Not able to assess (calculate score using [facility] averaging method)

## 2023-03-22 NOTE — PHYSICAL THERAPY INITIAL EVALUATION ADULT - GENERAL OBSERVATIONS, REHAB EVAL
Pt a/w BLE swelling, cellulitis, +heparin. Pt received in bed, spouse at bedside, +tele, +IVL, +heparin, A&OX4, +O2 via NC (baseline at home).

## 2023-03-22 NOTE — CONSULT NOTE ADULT - SUBJECTIVE AND OBJECTIVE BOX
[facility]), [facility]        Patient is a 72y old  Female who presents with a chief complaint of leg swelling (22 Mar 2023 08:11)    Excerpt from H&P,"   72 year-old-female with history of COPD (not on home O2), hx of DVT not on AC, Graves disease, HLD, HTN, hypothyroidism, breast cancer in situ, parathyroidectomy, kidney cancer w/mets s/p L partial nephrectomy (not on chemo/RT), obesity, tremors, and chronic lymphedema now presenting to the ED via EMS for worsening b/l lower extremity swelling x 1 month. Patient with decreased ambulatory status during this time frame, uses walker at baseline to ambulate. Has home health aide daily 9.5 hours. Pt compliant with lasix medication. Pt brought to ED given worsening leg swelling and erythema. States was seen last time for similar presentation. Arrives on home O2 (2L). Deneis chest pain, sob, abdominal pain, nausea/vomiting, fever/chills. Did not take lasix medication today.  (21 Mar 2023 16:52)           *****PAST MEDICAL / Surgical  HISTORY:  PAST MEDICAL & SURGICAL HISTORY:  Hypertension      Hyperlipidemia      Anxiety      Graves disease      Kidney cancer, primary, with metastasis from kidney to other site, left  LEft sided- s/p nephrectomy only- no chemo or RT      Breast cancer in situ, left      COPD (chronic obstructive pulmonary disease)      Hypothyroid      DVT (deep venous thrombosis)  history of- not presently on A/C      Obesity      Sleep apnea      Asthma      S/P cholecystectomy      S/p nephrectomy  left      S/P breast biopsy  left      History of pelvic surgery  Pelvic Sling      History of eye surgery  7 surgeries secondary to grave&#x27;s disease      History of carpal tunnel release  right wrist      History of parathyroidectomy      S/P laminectomy  now bed and wheelchair ridden with severe pain               *****FAMILY HISTORY:  FAMILY HISTORY:  Family history of myocardial infarction  mother  at age 67 and father at age 67 of MI&#x27;s    Family history of hypertension  mother and father    Family history of diabetes mellitus (Sibling)  siblings    Family history of heart disease (Sibling)  brother has a PPM    Family history of thyroid cancer (Child)  daughter has thyroid cancer             *****SOCIAL HISTORY:  Alcohol: None  Smoking: None         *****ALLERGIES:   Allergies    Grapes (Hives)  No Known Drug Allergies  Peaches (Hives)  shellfish (Hives)    Intolerances             *****MEDICATIONS: current medication reviewed and documented.   MEDICATIONS  (STANDING):  albuterol/ipratropium for Nebulization 3 milliLiter(s) Nebulizer every 6 hours  amLODIPine   Tablet 5 milliGRAM(s) Oral daily  aspirin enteric coated 81 milliGRAM(s) Oral daily  atorvastatin 40 milliGRAM(s) Oral at bedtime  busPIRone 10 milliGRAM(s) Oral <User Schedule>  ceFAZolin   IVPB      ceFAZolin   IVPB 2000 milliGRAM(s) IV Intermittent every 8 hours  chlorhexidine 2% Cloths 1 Application(s) Topical <User Schedule>  DULoxetine 60 milliGRAM(s) Oral daily  furosemide    Tablet 40 milliGRAM(s) Oral daily  gabapentin 100 milliGRAM(s) Oral two times a day  heparin   Injectable 5000 Unit(s) SubCutaneous every 8 hours  hydrALAZINE 75 milliGRAM(s) Oral two times a day  levothyroxine 125 MICROGram(s) Oral daily  losartan 100 milliGRAM(s) Oral daily  metoprolol tartrate 25 milliGRAM(s) Oral two times a day  montelukast 10 milliGRAM(s) Oral daily  OLANZapine 10 milliGRAM(s) Oral daily  predniSONE   Tablet 5 milliGRAM(s) Oral daily  tiotropium 2.5 MICROgram(s) Inhaler 2 Puff(s) Inhalation daily    MEDICATIONS  (PRN):  acetaminophen     Tablet .. 650 milliGRAM(s) Oral every 6 hours PRN Temp greater or equal to 38.5C (101.3F), Mild Pain (1 - 3)  baclofen 5 milliGRAM(s) Oral every 8 hours PRN Muscle Spasm  melatonin 3 milliGRAM(s) Oral at bedtime PRN Insomnia  oxycodone    5 mG/acetaminophen 325 mG 1 Tablet(s) Oral every 6 hours PRN Moderate Pain (4 - 6)           *****REVIEW OF SYSTEM:  GEN: no fever, no chills, no pain  RESP: no SOB, no cough, no sputum  CVS: no chest pain, no palpitations, no edema  GI: no abdominal pain, no nausea, no vomiting, no constipation, no diarrhea  : no dysurea, no frequency, no hematurea  Neuro: no headache, no dizziness  PSYCH: no anxiety, no depression  Derm : no itching, no rash         *****VITAL SIGNS:  T(C): 36.4 (03-22-23 @ 12:37), Max: 36.6 (23 @ 19:00)  HR: 81 (23 @ 12:37) (81 - 96)  BP: 131/81 (23 @ 12:37) (109/56 - 144/99)  RR: 18 (23 @ 12:37) (18 - 20)  SpO2: 97% (23 @ 12:37) (95% - 97%)  Wt(kg): --     @ 07:01  -   @ 07:00  --------------------------------------------------------  IN: 0 mL / OUT: 350 mL / NET: -350 mL     @ 07:01  -   @ 17:07  --------------------------------------------------------  IN: 0 mL / OUT: 1000 mL / NET: -1000 mL             *****PHYSICAL EXAM:   Alert oriented x 3   Attention comprehension are fair. Able to name, repeat, read without any difficulty.   Able to follow 3 step commands.     EOMI fundi not visualized,  VFF to confrontration  No facial asymmetry   Tongue is midline   Palate elevates symmetrically   Moving all 4 ext symmetrically no pronator drift   Reflexes are symmetric throughout   sensation is grossly symmetric  Gait : not assessed.  B/L down going toes               *****LAB AND IMAGIN.7   11.60 )-----------( 172      ( 22 Mar 2023 09:55 )             38.0                   141  |  99  |  15  ----------------------------<  134<H>  4.1   |  30  |  0.70    Ca    9.9      22 Mar 2023 07:03    TPro  6.4  /  Alb  3.6  /  TBili  0.6  /  DBili  x   /  AST  18  /  ALT  14  /  AlkPhos  103  03-21    PT/INR - ( 21 Mar 2023 17:38 )   PT: 13.5 sec;   INR: 1.16 ratio         PTT - ( 22 Mar 2023 09:55 )  PTT:>200.0 sec            CARDIAC MARKERS ( 21 Mar 2023 17:38 )  x     / x     / 126 U/L / x     / 1.8 ng/mL                  [All pertinent recent Imaging reports reviewed]         *****A S S E S S M E N T   A N D   P L A N :     Excerpt from H&P,"   72 year-old-female with history of COPD (not on home O2), hx of DVT not on AC, Graves disease, HLD, HTN, hypothyroidism, breast cancer in situ, parathyroidectomy, kidney cancer w/mets s/p L partial nephrectomy (not on chemo/RT), obesity, tremors, and chronic lymphedema now presenting to the ED via EMS for worsening b/l lower extremity swelling x 1 month. Patient with decreased ambulatory status during this time frame, uses walker at baseline to ambulate. Has home health aide daily 9.5 hours. Pt compliant with lasix medication. Pt brought to ED given worsening leg swelling and erythema. States was seen last time for similar presentation. Arrives on home O2 (2L). Deneis chest pain, sob, abdominal pain, nausea/vomiting, fever/chills. Did not take lasix medication today.       Problem/Recommendations 2:         pt at risk for developing delirium, therefore please institute the following preventative measures if possible          - initiating early mobilization          -minimizing "tethers" - IV, oxygen, catheters, etc          -avoiding   sedatives          -maintaining hydration/nutrition          -avoid anticholinergics - diphenhydramine, etc          -pain control          -sleep wake cycle regulation; avoid day time somnolence           -supportive environment    ___________________________  Will follow with you.  Thank you,  Lizeth Barron MD  Diplomate of the American Board of Neurology and Psychiatry.  Diplomate of the American Board of Vascular Neurology.   [facility] ([facility]   Ph: [phone]    Differential diagnosis and plan of care discussed with patient after the evaluation.   Advanced care planning options discussed.   Pain assessed and judicious use of narcotics when appropriate was discussed.  Importance of Fall prevention discussed.  Counseling on Smoking and Alcohol cessation was offered when appropriate.  Counseling on Diet, exercise, and medication compliance was done.   83 minutes spent on the total encounter;  more than 50 % of the visit was spent on counseling  and or coordinating care by the attending physician.    Thank you for allowing me to participate in the care of this jie patient. Please do not hesitate to call me if you have any questions.     This and subsequent notes  will  inherently be subject to errors including those of syntax and substitutions which may escape proofreading. In such instances original meaning may be extrapolated by contextual derivation.    [facility] [place]([facility]), [facility]        Patient is a 72y old  Female who presents with a chief complaint of leg swelling (22 Mar 2023 08:11)    Excerpt from H&P,"   72 year-old-female with history of COPD (not on home O2), hx of DVT not on AC, Graves disease, HLD, HTN, hypothyroidism, breast cancer in situ, parathyroidectomy, kidney cancer w/mets s/p L partial nephrectomy (not on chemo/RT), obesity, tremors, and chronic lymphedema now presenting to the ED via EMS for worsening b/l lower extremity swelling x 1 month.  Has home health aide daily 9.5 hours. Pt compliant with lasix medication. Pt brought to ED given worsening leg swelling and erythema. States was seen last time for similar presentation. Arrives on home O2 (2L). Deneis chest pain, sob, abdominal pain, nausea/vomiting, fever/chills. Did not take lasix medication today.          *****PAST MEDICAL / Surgical  HISTORY:  PAST MEDICAL & SURGICAL HISTORY:  Hypertension      Hyperlipidemia      Anxiety      Graves disease      Kidney cancer, primary, with metastasis from kidney to other site, left  LEft sided- s/p nephrectomy only- no chemo or RT      Breast cancer in situ, left      COPD (chronic obstructive pulmonary disease)      Hypothyroid      DVT (deep venous thrombosis)  history of- not presently on A/C      Obesity      Sleep apnea      Asthma      S/P cholecystectomy      S/p nephrectomy  left      S/P breast biopsy  left      History of pelvic surgery  Pelvic Sling      History of eye surgery  7 surgeries secondary to grave&#x27;s disease      History of carpal tunnel release  right wrist      History of parathyroidectomy      S/P laminectomy  now bed and wheelchair ridden with severe pain               *****FAMILY HISTORY:  FAMILY HISTORY:  Family history of myocardial infarction  mother  at age 67 and father at age 67 of MI&#x27;s    Family history of hypertension  mother and father    Family history of diabetes mellitus (Sibling)  siblings    Family history of heart disease (Sibling)  brother has a PPM    Family history of thyroid cancer (Child)  daughter has thyroid cancer             *****SOCIAL HISTORY:  Alcohol: None  Smoking: None         *****ALLERGIES:   Allergies    Grapes (Hives)  No Known Drug Allergies  Peaches (Hives)  shellfish (Hives)    Intolerances             *****MEDICATIONS: current medication reviewed and documented.   MEDICATIONS  (STANDING):  albuterol/ipratropium for Nebulization 3 milliLiter(s) Nebulizer every 6 hours  amLODIPine   Tablet 5 milliGRAM(s) Oral daily  aspirin enteric coated 81 milliGRAM(s) Oral daily  atorvastatin 40 milliGRAM(s) Oral at bedtime  busPIRone 10 milliGRAM(s) Oral <User Schedule>  ceFAZolin   IVPB      ceFAZolin   IVPB 2000 milliGRAM(s) IV Intermittent every 8 hours  chlorhexidine 2% Cloths 1 Application(s) Topical <User Schedule>  DULoxetine 60 milliGRAM(s) Oral daily  furosemide    Tablet 40 milliGRAM(s) Oral daily  gabapentin 100 milliGRAM(s) Oral two times a day  heparin   Injectable 5000 Unit(s) SubCutaneous every 8 hours  hydrALAZINE 75 milliGRAM(s) Oral two times a day  levothyroxine 125 MICROGram(s) Oral daily  losartan 100 milliGRAM(s) Oral daily  metoprolol tartrate 25 milliGRAM(s) Oral two times a day  montelukast 10 milliGRAM(s) Oral daily  OLANZapine 10 milliGRAM(s) Oral daily  predniSONE   Tablet 5 milliGRAM(s) Oral daily  tiotropium 2.5 MICROgram(s) Inhaler 2 Puff(s) Inhalation daily    MEDICATIONS  (PRN):  acetaminophen     Tablet .. 650 milliGRAM(s) Oral every 6 hours PRN Temp greater or equal to 38.5C (101.3F), Mild Pain (1 - 3)  baclofen 5 milliGRAM(s) Oral every 8 hours PRN Muscle Spasm  melatonin 3 milliGRAM(s) Oral at bedtime PRN Insomnia  oxycodone    5 mG/acetaminophen 325 mG 1 Tablet(s) Oral every 6 hours PRN Moderate Pain (4 - 6)           *****REVIEW OF SYSTEM:  GEN: no fever, no chills, no pain  RESP: no SOB, no cough, no sputum  CVS: no chest pain, no palpitations, no edema  GI: no abdominal pain, no nausea, no vomiting, no constipation, no diarrhea  : no dysurea, no frequency, no hematurea  Neuro: no headache, no dizziness  PSYCH: no anxiety, no depression  Derm : no itching, no rash         *****VITAL SIGNS:  T(C): 36.4 (23 @ 12:37), Max: 36.6 (23 @ 19:00)  HR: 81 (23 @ 12:37) (81 - 96)  BP: 131/81 (23 @ 12:37) (109/56 - 144/99)  RR: 18 (23 @ 12:37) (18 - 20)  SpO2: 97% (23 @ 12:37) (95% - 97%)  Wt(kg): --     @ 07:01  -   @ 07:00  --------------------------------------------------------  IN: 0 mL / OUT: 350 mL / NET: -350 mL     @ 07:01  -   @ 17:07  --------------------------------------------------------  IN: 0 mL / OUT: 1000 mL / NET: -1000 mL             *****PHYSICAL EXAM:   Alert oriented x2   Attention comprehension are fair. Able to name, repeat, read without any difficulty.   Able to follow 3 step commands.     EOMI fundi not visualized,  blinks to threat   No facial asymmetry   Tongue is midline   Palate elevates symmetrically   paraparetic  b/l upper ext are 4/5     Reflexes are  diminished  throughout   Cogwheeling noted   sensation is grossly symmetric  Gait : not assessed.  B/L down going toes               *****LAB AND IMAGIN.7   11.60 )-----------( 172      ( 22 Mar 2023 09:55 )             38.0                   141  |  99  |  15  ----------------------------<  134<H>  4.1   |  30  |  0.70    Ca    9.9      22 Mar 2023 07:03    TPro  6.4  /  Alb  3.6  /  TBili  0.6  /  DBili  x   /  AST  18  /  ALT  14  /  AlkPhos  103  03-21    PT/INR - ( 21 Mar 2023 17:38 )   PT: 13.5 sec;   INR: 1.16 ratio         PTT - ( 22 Mar 2023 09:55 )  PTT:>200.0 sec            CARDIAC MARKERS ( 21 Mar 2023 17:38 )  x     / x     / 126 U/L / x     / 1.8 ng/mL                  [All pertinent recent Imaging reports reviewed]         *****A S S E S S M E N T   A N D   P L A N :     Excerpt from H&P,"   72 year-old-female with history of COPD ( on home O2), hx of DVT not on AC, Graves disease, HLD, HTN, hypothyroidism, breast cancer in situ, parathyroidectomy, kidney cancer w/mets s/p L partial nephrectomy (not on chemo/RT), obesity, tremors, and chronic lymphedema now presenting to the ED via EMS for worsening b/l lower extremity swelling x 1 month.   Has home health aide daily 9.5 hours. Pt compliant with lasix medication. Pt brought to ED given worsening leg swelling and erythema. States was seen last time for similar presentation. Arrives on home O2 (2L). Deneis chest pain, sob, abdominal pain, nausea/vomiting, fever/chills. Did not take lasix medication today.     tremor of unclear etiology  related to graves vs. elevated pco2 level   consider changing albuterol to xopenex     abg with elevated  co2 likely causing recent change   defer to pulm to manage ? need bipap   can consider checking thyroid antibodies vs. following up with endocrine             pt at risk for developing delirium, therefore please institute the following preventative measures if possible          - initiating early mobilization          -minimizing "tethers" - IV, oxygen, catheters, etc          -avoiding   sedatives          -maintaining hydration/nutrition          -avoid anticholinergics - diphenhydramine, etc          -pain control          -sleep wake cycle regulation; avoid day time somnolence           -supportive environment    ___________________________  Will follow with you.  Thank you,  Lizeth Barron MD  Diplomate of the American Board of Neurology and Psychiatry.  Diplomate of the American Board of Vascular Neurology.   [facility] ([facility]), [facility]   Ph: [phone]    Differential diagnosis and plan of care discussed with patient after the evaluation.   Advanced care planning options discussed.   Pain assessed and judicious use of narcotics when appropriate was discussed.  Importance of Fall prevention discussed.  Counseling on Smoking and Alcohol cessation was offered when appropriate.  Counseling on Diet, exercise, and medication compliance was done.   83 minutes spent on the total encounter;  more than 50 % of the visit was spent on counseling  and or coordinating care by the attending physician.    Thank you for allowing me to participate in the care of this jie patient. Please do not hesitate to call me if you have any questions.     This and subsequent notes  will  inherently be subject to errors including those of syntax and substitutions which may escape proofreading. In such instances original meaning may be extrapolated by contextual derivation.

## 2023-03-22 NOTE — PHARMACOTHERAPY INTERVENTION NOTE - COMMENTS
Performed medication reconciliation and home medication list updated in outpatient medication review. Medications verified with patient and Pharmacy. Please refer to specifics in home medication list (outpatient medication review).    Home Medications:  aspirin 81 mg oral delayed release tablet: 1 tab(s) orally once a day   atorvastatin 40 mg oral tablet: 1 tab(s) orally once a day   baclofen 10 mg oral tablet: 1 tab(s) orally 3 times a day     busPIRone 10 mg oral tablet: 1 tab(s) orally once a day   NOTE: Pharmacy has directions of 2 times a day, As per patient is taking it as once daily    cefadroxil 500 mg oral capsule: 1 cap(s) orally every 12 hours   DULoxetine 60 mg oral delayed release capsule: 1 cap(s) orally 2 times a day   gabapentin 100 mg oral capsule: 1 cap(s) orally 2 times a day (at noon and at bedtime)   hydrALAZINE 25 mg oral tablet: 3 tab(s) orally 2 times a day   levothyroxine 125 mcg (0.125 mg) oral tablet: 1 tab(s) orally once a day   losartan 100 mg oral tablet: 1 tab(s) orally once a day   melatonin 3 mg oral tablet: 1 tab(s) orally once a day (at bedtime)  montelukast 10 mg oral tablet: 1 tab(s) orally once a day   OLANZapine 10 mg oral tablet: 1 tab(s) orally once a day   predniSONE 5 mg oral tablet: 1 tab(s) orally once a day   propranolol 20 mg oral tablet: 1 tab(s) orally 2 times a day - pharmacy listed as picked up in 3/2023 but pt doesnt remember   Spiriva HandiHaler 18 mcg inhalation capsule: 1 cap(s) inhaled once a day  Vitamin B12 1000 mcg oral tablet: 1 tab(s) orally once a day     Recommendations:  -Patient stated her dose of olanzapine has been decreased from 20 mg to 10 mg, and confirmed by pharmacy.   -Patient stated she stopped taking amlodipine 5 mg two weeks ago due to lower extremity swelling.    All other medications appropriately reconciled.    Arnaldo Stanton  PharmD Candidate, Class of 2023    Nabila Albarran  Clinical Pharmacy Specialist  Available on Teams.   Performed medication reconciliation and home medication list updated in outpatient medication review. Medications verified with patient and Pharmacy. Please refer to specifics in home medication list (outpatient medication review).    Home Medications:  aspirin 81 mg oral delayed release tablet: 1 tab(s) orally once a day   atorvastatin 40 mg oral tablet: 1 tab(s) orally once a day   baclofen 10 mg oral tablet: 1 tab(s) orally 3 times a day  busPIRone 10 mg oral tablet: 1 tab(s) orally once a day - Pharmacy has directions of 2 times a day, As per patient is taking it as once daily  cefadroxil 500 mg oral capsule: 1 cap(s) orally every 12 hours   DULoxetine 60 mg oral delayed release capsule: 1 cap(s) orally 2 times a day   gabapentin 100 mg oral capsule: 1 cap(s) orally 2 times a day (at noon and at bedtime)   hydrALAZINE 25 mg oral tablet: 3 tab(s) orally 2 times a day   levothyroxine 125 mcg (0.125 mg) oral tablet: 1 tab(s) orally once a day   losartan 100 mg oral tablet: 1 tab(s) orally once a day   melatonin 3 mg oral tablet: 1 tab(s) orally once a day (at bedtime)  montelukast 10 mg oral tablet: 1 tab(s) orally once a day   OLANZapine 10 mg oral tablet: 1 tab(s) orally once a day   predniSONE 5 mg oral tablet: 1 tab(s) orally once a day   propranolol 20 mg oral tablet: 1 tab(s) orally 2 times a day - pharmacy listed as picked up in 3/2023 but pt doesnt remember   Spiriva HandiHaler 18 mcg inhalation capsule: 1 cap(s) inhaled once a day  Vitamin B12 1000 mcg oral tablet: 1 tab(s) orally once a day     Recommendations:  1) Patient stated her dose of olanzapine outpatient has been decreased from 20 mg to 10 mg recently, and confirmed by pharmacy (10mg is the dose most recently picked up in 03/2023). Currently on olanzapine 20mg daily ordered inaptient, spoke with NP walker Le dc olanzapine 20mg and order for olanzapine 10mg daily.   2) Patient stated she stopped taking amlodipine 5 mg two weeks ago due to lower extremity swelling. Communicated with provider.     All other medications appropriately reconciled.    Arnaldo Stanton  PharmD Candidate, Class of 2023    Nabila Albarran  Clinical Pharmacy Specialist  Available on Teams.

## 2023-03-23 ENCOUNTER — TRANSCRIPTION ENCOUNTER (OUTPATIENT)
Age: 73
End: 2023-03-23

## 2023-03-23 LAB
ANION GAP SERPL CALC-SCNC: 12 MMOL/L — SIGNIFICANT CHANGE UP (ref 5–17)
BUN SERPL-MCNC: 10 MG/DL — SIGNIFICANT CHANGE UP (ref 7–23)
CALCIUM SERPL-MCNC: 9.7 MG/DL — SIGNIFICANT CHANGE UP (ref 8.4–10.5)
CHLORIDE SERPL-SCNC: 99 MMOL/L — SIGNIFICANT CHANGE UP (ref 96–108)
CO2 SERPL-SCNC: 30 MMOL/L — SIGNIFICANT CHANGE UP (ref 22–31)
CREAT SERPL-MCNC: 0.54 MG/DL — SIGNIFICANT CHANGE UP (ref 0.5–1.3)
EGFR: 97 ML/MIN/1.73M2 — SIGNIFICANT CHANGE UP
GLUCOSE SERPL-MCNC: 167 MG/DL — HIGH (ref 70–99)
HCT VFR BLD CALC: 38 % — SIGNIFICANT CHANGE UP (ref 34.5–45)
HGB BLD-MCNC: 11.9 G/DL — SIGNIFICANT CHANGE UP (ref 11.5–15.5)
MAGNESIUM SERPL-MCNC: 1.8 MG/DL — SIGNIFICANT CHANGE UP (ref 1.6–2.6)
MCHC RBC-ENTMCNC: 31 PG — SIGNIFICANT CHANGE UP (ref 27–34)
MCHC RBC-ENTMCNC: 31.3 GM/DL — LOW (ref 32–36)
MCV RBC AUTO: 99 FL — SIGNIFICANT CHANGE UP (ref 80–100)
MRSA PCR RESULT.: SIGNIFICANT CHANGE UP
NRBC # BLD: 0 /100 WBCS — SIGNIFICANT CHANGE UP (ref 0–0)
PLATELET # BLD AUTO: 154 K/UL — SIGNIFICANT CHANGE UP (ref 150–400)
POTASSIUM SERPL-MCNC: 4.6 MMOL/L — SIGNIFICANT CHANGE UP (ref 3.5–5.3)
POTASSIUM SERPL-SCNC: 4.6 MMOL/L — SIGNIFICANT CHANGE UP (ref 3.5–5.3)
RBC # BLD: 3.84 M/UL — SIGNIFICANT CHANGE UP (ref 3.8–5.2)
RBC # FLD: 15.3 % — HIGH (ref 10.3–14.5)
S AUREUS DNA NOSE QL NAA+PROBE: SIGNIFICANT CHANGE UP
SODIUM SERPL-SCNC: 141 MMOL/L — SIGNIFICANT CHANGE UP (ref 135–145)
WBC # BLD: 8.5 K/UL — SIGNIFICANT CHANGE UP (ref 3.8–10.5)
WBC # FLD AUTO: 8.5 K/UL — SIGNIFICANT CHANGE UP (ref 3.8–10.5)

## 2023-03-23 RX ADMIN — Medication 75 MILLIGRAM(S): at 05:47

## 2023-03-23 RX ADMIN — HEPARIN SODIUM 5000 UNIT(S): 5000 INJECTION INTRAVENOUS; SUBCUTANEOUS at 21:03

## 2023-03-23 RX ADMIN — GABAPENTIN 100 MILLIGRAM(S): 400 CAPSULE ORAL at 17:13

## 2023-03-23 RX ADMIN — Medication 1 APPLICATION(S): at 17:55

## 2023-03-23 RX ADMIN — GABAPENTIN 100 MILLIGRAM(S): 400 CAPSULE ORAL at 05:46

## 2023-03-23 RX ADMIN — OLANZAPINE 10 MILLIGRAM(S): 15 TABLET, FILM COATED ORAL at 13:05

## 2023-03-23 RX ADMIN — DULOXETINE HYDROCHLORIDE 60 MILLIGRAM(S): 30 CAPSULE, DELAYED RELEASE ORAL at 13:06

## 2023-03-23 RX ADMIN — CHLORHEXIDINE GLUCONATE 1 APPLICATION(S): 213 SOLUTION TOPICAL at 05:51

## 2023-03-23 RX ADMIN — LOSARTAN POTASSIUM 100 MILLIGRAM(S): 100 TABLET, FILM COATED ORAL at 05:46

## 2023-03-23 RX ADMIN — Medication 3 MILLIGRAM(S): at 21:43

## 2023-03-23 RX ADMIN — Medication 100 MILLIGRAM(S): at 21:02

## 2023-03-23 RX ADMIN — Medication 81 MILLIGRAM(S): at 13:05

## 2023-03-23 RX ADMIN — Medication 25 MILLIGRAM(S): at 17:14

## 2023-03-23 RX ADMIN — Medication 100 MILLIGRAM(S): at 05:43

## 2023-03-23 RX ADMIN — Medication 10 MILLIGRAM(S): at 09:29

## 2023-03-23 RX ADMIN — HEPARIN SODIUM 5000 UNIT(S): 5000 INJECTION INTRAVENOUS; SUBCUTANEOUS at 05:52

## 2023-03-23 RX ADMIN — Medication 25 MILLIGRAM(S): at 05:47

## 2023-03-23 RX ADMIN — ATORVASTATIN CALCIUM 40 MILLIGRAM(S): 80 TABLET, FILM COATED ORAL at 21:05

## 2023-03-23 RX ADMIN — TIOTROPIUM BROMIDE 2 PUFF(S): 18 CAPSULE ORAL; RESPIRATORY (INHALATION) at 13:06

## 2023-03-23 RX ADMIN — Medication 3 MILLILITER(S): at 00:07

## 2023-03-23 RX ADMIN — MONTELUKAST 10 MILLIGRAM(S): 4 TABLET, CHEWABLE ORAL at 13:06

## 2023-03-23 RX ADMIN — Medication 100 MILLIGRAM(S): at 13:08

## 2023-03-23 RX ADMIN — Medication 125 MICROGRAM(S): at 05:47

## 2023-03-23 RX ADMIN — AMLODIPINE BESYLATE 5 MILLIGRAM(S): 2.5 TABLET ORAL at 05:47

## 2023-03-23 RX ADMIN — Medication 40 MILLIGRAM(S): at 05:47

## 2023-03-23 RX ADMIN — Medication 3 MILLILITER(S): at 13:07

## 2023-03-23 RX ADMIN — Medication 75 MILLIGRAM(S): at 17:14

## 2023-03-23 RX ADMIN — Medication 5 MILLIGRAM(S): at 05:46

## 2023-03-23 RX ADMIN — HEPARIN SODIUM 5000 UNIT(S): 5000 INJECTION INTRAVENOUS; SUBCUTANEOUS at 13:06

## 2023-03-23 RX ADMIN — Medication 400 MILLIGRAM(S): at 13:08

## 2023-03-23 RX ADMIN — Medication 1 APPLICATION(S): at 05:53

## 2023-03-23 RX ADMIN — Medication 3 MILLILITER(S): at 17:15

## 2023-03-23 NOTE — PROGRESS NOTE ADULT - NUTRITIONAL ASSESSMENT
This patient has been assessed with a concern for Malnutrition and has been determined to have a diagnosis/diagnoses of Morbid obesity (BMI > 40).    This patient is being managed with:   Diet Regular-  Entered: Mar 21 2023  5:11PM

## 2023-03-23 NOTE — DISCHARGE NOTE PROVIDER - NSDCFUADDAPPT_GEN_ALL_CORE_FT
APPTS ARE READY TO BE MADE: [ X] YES    Best Family or Patient Contact (if needed):    Additional Information about above appointments (if needed):    1: Dr. Salcedo  2: Dr. Fletcher  3:     Other comments or requests:    APPTS ARE READY TO BE MADE: [ X] YES    Best Family or Patient Contact (if needed):    Additional Information about above appointments (if needed):    1: Dr. Salcedo  2: Dr. Fletcher  3:     Other comments or requests:   Patient was provided with follow up request details for Denisse Gurrola, and the [facility] and was advised to call to schedule follow up within specified time frame.

## 2023-03-23 NOTE — DIETITIAN INITIAL EVALUATION ADULT - ETIOLOGY
Increased physiological demand for nutrients and wound healing  presumed excessive energy intake greater than estimated needs

## 2023-03-23 NOTE — DIETITIAN INITIAL EVALUATION ADULT - NSFNSGIIOFT_GEN_A_CORE
Pt denies any current N/V, reports some diarrhea in house (possibly partially secondary to Abx). Per pt, last BM x yesterday. No current bowel regimen in place.

## 2023-03-23 NOTE — DISCHARGE NOTE PROVIDER - CARE PROVIDER_API CALL
Alycia Fletcher)  Medicine  Dermatology  [address], [address]  [place] [address]  Phone: [phone]  Fax: [phone]  Follow Up Time: 1 week    Juan Salecdo)  Internal Medicine  [address] [address]  Phone: [phone]  Fax: [phone]  Follow Up Time: 1 week

## 2023-03-23 NOTE — DISCHARGE NOTE PROVIDER - NSDCCPCAREPLAN_GEN_ALL_CORE_FT
PRINCIPAL DISCHARGE DIAGNOSIS  Diagnosis: Cellulitis  Assessment and Plan of Treatment: Treated with IV antibiotics  Improving   Follow-up with your Primary Care Doctor

## 2023-03-23 NOTE — DISCHARGE NOTE PROVIDER - NSFOLLOWUPCLINICS_GEN_ALL_ED_FT
[facility]  Pulmonology  [address] [place] [address], [place] [address]  Phone: [phone]  Fax:   Follow Up Time: 1 week

## 2023-03-23 NOTE — PROGRESS NOTE ADULT - ASSESSMENT
72y female with a past history of COPD, DVT(no AC), Graves disease,HTN,HLD,partial left nephrectomy for renal cell cancer, parathyroidectomy, known metastatic renal cell cancer, who was admitted with B/L lower extremity edema and erythema.  She has been admitted in past for similar problem.  She is barely ambulatory. No fever or chills. She uses home O2 PRN.  This has been a chronic problem, has been getting worse .Cefazolin was started in ER.  LE dopplers negative.  Suspect combination of lymphedema, venous stasis, and perhaps lipodermatosclerosis as apposed to true cellulitis.   She is afebrile, has a normal wbc count, and has b/l leg involvement, worse on left.    Suggest  1. Cefazolin for now, will likely limit course  2. dermatology input appreciated, topical rx  3. Leg elevation, diuresis may help as well  4. d/w pt and family at length

## 2023-03-23 NOTE — DIETITIAN INITIAL EVALUATION ADULT - ENERGY INTAKE
Detail Level: Detailed Plan: This patient has been treated today with superficial radiation therapy for non-melanoma skin cancer.    \\n\\nWritten informed consent has been previously obtained from this patient for this treatment.  This consent is documented in the patient’s chart.  The patient gave verbal consent to continue treatment today. \\n\\nThe patient was treated with a specific radiation dose and setup as prescribed by the provider listed on this visit note.  A Radiation Therapist performed administration of radiation. The treatment parameters and cumulative dose are indicated above.  \\n\\nPrior to administering the radiation, the patient underwent a verification simulation defining relevant normal and abnormal target anatomy and acquiring images and data necessary to develop optimal radiation treatment process for the patient. This process includes verification of the treatment port(s) and proper treatment positioning.  All treatment ports were photographed.  The patient’s customized lead blocking was confirmed.   Considering, superficial radiotherapy setup is a clinical setup, this requires physician and radiation therapist to clarify location interest being treated against initial images, pathology and patient anatomy. Care was taken ensuring fields treated were geometrically accurate and properly positioned using daily verification simulation.  This process is also utilized to determine if any changes are necessary.   These steps are therefore medically necessary ensuring safe and effective administration of radiation. \\n\\nA high frequency ultrasound image was acquired today for two-dimensional evaluation of the tumor volume and response to treatment, in addition to geometric accuracy of field placement. The daily field placement is separate and distinct from the initial simulation and is an important task in providing safe administration of radiation therapy. Physician evaluation of the ultrasound tumor depth will be ongoing throughout the course of treatment and is deemed medically necessary in order to ensure the efficacy of treatment. Today’s image was evaluated for determination of continuation of treatment with the current plan or with necessary changes as appropriate. Additionally, the use of visualization and targeted assessment allows the patient to be able to see their cancer(s) progress, encouraging patient to complete full course of radiotherapy.  \\n\\nUS depth is 0.0mm, which is 0mm in difference from previous imaging. Repop +++\\n\\nPer Dr. Esteban, continued daily US guidance and clinical setup simulation is required for field placement, measurement of tumor depth, progress and acute effect monitoring. \\n\\n Samples Given: [place] cream 3x/day Fair (50-75%) In house, pt reports fair PO intake x admission. No PO intake information available per flowsheets at this time. Discussed food preferences; RD to provide nutrient dense snack with meals to promote adequate PO intake.

## 2023-03-23 NOTE — DISCHARGE NOTE PROVIDER - PROVIDER TOKENS
PROVIDER:[TOKEN:[[id number]],FOLLOWUP:[1 week]],PROVIDER:[TOKEN:[[id number]],FOLLOWUP:[1 week]]

## 2023-03-23 NOTE — DISCHARGE NOTE PROVIDER - NSDCMRMEDTOKEN_GEN_ALL_CORE_FT
aspirin 81 mg oral delayed release tablet: 1 tab(s) orally once a day  atorvastatin 40 mg oral tablet: 1 tab(s) orally once a day  baclofen 10 mg oral tablet: 1 tab(s) orally 3 times a day  busPIRone 10 mg oral tablet: 1 tab(s) orally once a day     NOTE: Pharmacy has directions of 2 times a day, As per patient is taking it as once daily  cefadroxil 500 mg oral capsule: 1 cap(s) orally every 12 hours  DULoxetine 60 mg oral delayed release capsule: 1 cap(s) orally 2 times a day  furosemide 40 mg oral tablet: 1 tab(s) orally once a day  gabapentin 100 mg oral capsule: 1 cap(s) orally 2 times a day (at noon and at bedtime)  hydrALAZINE 25 mg oral tablet: 3 tab(s) orally 2 times a day  levothyroxine 125 mcg (0.125 mg) oral tablet: 1 tab(s) orally once a day  losartan 100 mg oral tablet: 1 tab(s) orally once a day  melatonin 3 mg oral tablet: 1 tab(s) orally once a day (at bedtime)  montelukast 10 mg oral tablet: 1 tab(s) orally once a day  OLANZapine 10 mg oral tablet: 1 tab(s) orally once a day  predniSONE 5 mg oral tablet: 1 tab(s) orally once a day  propranolol 20 mg oral tablet: 1 tab(s) orally 2 times a day - pharmacy listed as picked up in 3/2023 but pt doesnt remember  Spiriva HandiHaler 18 mcg inhalation capsule: 1 cap(s) inhaled once a day  Vitamin B12 1000 mcg oral tablet: 1 tab(s) orally once a day   Albuterol (Eqv-ProAir HFA) 90 mcg/inh inhalation aerosol: 2 puff(s) inhaled every 6 hours -for chest pain   amLODIPine 5 mg oral tablet: 1 tab(s) orally once a day  aspirin 81 mg oral delayed release tablet: 1 tab(s) orally once a day  atorvastatin 40 mg oral tablet: 1 tab(s) orally once a day  baclofen 5 mg oral tablet: 1 tab(s) orally every 8 hours, As needed, Muscle Spasm  busPIRone 10 mg oral tablet: 1 tab(s) orally once a day     NOTE: Pharmacy has directions of 2 times a day, As per patient is taking it as once daily  clobetasol 0.05% topical ointment: 1 application topically 2 times a day to bilateral lower extremity (stop 4/5/23)  DULoxetine 60 mg oral delayed release capsule: 1 cap(s) orally 2 times a day  furosemide 40 mg oral tablet: 1 tab(s) orally once a day  gabapentin 100 mg oral capsule: 1 cap(s) orally 2 times a day (at noon and at bedtime)  hydrALAZINE 25 mg oral tablet: 3 tab(s) orally 2 times a day  levothyroxine 125 mcg (0.125 mg) oral tablet: 1 tab(s) orally once a day  losartan 100 mg oral tablet: 1 tab(s) orally once a day  melatonin 3 mg oral tablet: 1 tab(s) orally once a day (at bedtime)  metoprolol tartrate 25 mg oral tablet: 1 tab(s) orally 2 times a day  montelukast 10 mg oral tablet: 1 tab(s) orally once a day  OLANZapine 10 mg oral tablet: 1 tab(s) orally once a day  pentoxifylline 400 mg oral tablet, extended release: 1 tab(s) orally once a day  predniSONE 5 mg oral tablet: 1 tab(s) orally once a day  Spiriva HandiHaler 18 mcg inhalation capsule: 1 cap(s) inhaled once a day  Vitamin B12 1000 mcg oral tablet: 1 tab(s) orally once a day

## 2023-03-23 NOTE — DIETITIAN INITIAL EVALUATION ADULT - OTHER CALCULATIONS
Fluid needs deferred to team.   Estimated Needs using IBW + 10% (110 pounds) in setting of BMI >50.

## 2023-03-23 NOTE — DIETITIAN INITIAL EVALUATION ADULT - REASON FOR ADMISSION
Cellulitis    Per chart: 72 year-old-female with history of COPD (not on home O2), hx of DVT not on AC, Graves disease, HLD, HTN, hypothyroidism, breast cancer in situ, parathyroidectomy, kidney cancer w/mets s/p L partial nephrectomy (not on chemo/RT), obesity, tremors, and chronic lymphedema now presenting to the ED via EMS for worsening b/l lower extremity swelling x 1 month. Patient with decreased ambulatory status during this time frame, uses walker at baseline to ambulate. Has home health aide daily 9.5 hours. Pt compliant with lasix medication. Pt brought to ED given worsening leg swelling and erythema.

## 2023-03-23 NOTE — DIETITIAN INITIAL EVALUATION ADULT - PERTINENT LABORATORY DATA
03-23    141  |  99  |  10  ----------------------------<  167<H>  4.6   |  30  |  0.54    Ca    9.7      23 Mar 2023 12:16  Mg     1.8     03-23

## 2023-03-23 NOTE — PROGRESS NOTE ADULT - ASSESSMENT
72 f with    Cellulitis legs  - antibiotic  - ID follow    - Dermatology evaluation noted    A Flutter vs EKG with error  - DC Heparin  - telemetry with SR  - TSH  - Echo  - Cardiology follow    Tremor  - Neurology evaluation noted    Anxiety / Depression   - control    Asthma   - nebs    Breast cancer in situ, left   - stable    COPD (chronic obstructive pulmonary disease)   - nebs    Graves disease   - follow TSH    Hyperlipidemia   - stable    Hypertension   - control    Hypothyroid   - continue supplementation    Kidney cancer, primary, with metastasis from kidney to other site, left LEft sided- s/p nephrectomy only- no chemo or RT  - stable    Obesity   - Nutrition education    DVT prophylaxis  - AC    d/w patient and son     Nathan Mora MD phone [phone]

## 2023-03-23 NOTE — DISCHARGE NOTE PROVIDER - HOSPITAL COURSE
72y female with a past history of COPD, DVT(no AC), Graves disease,HTN,HLD,partial left nephrectomy for renal cell cancer, parathyroidectomy, known metastatic renal cell cancer, who was admitted with B/L lower extremity edema and erythema.  She has been admitted in past for similar problem.  She is barely ambulatory. No fever or chills. She uses home O2 PRN.  This has been a chronic problem, has been getting worse .Cefazolin was started in ER.  LE dopplers negative.  Suspect combination of lymphedema, venous stasis, and perhaps lipodermatosclerosis as apposed to true cellulitis.   She is afebrile, has a normal wbc count, and has b/l leg involvement, worse on left.    1.Cefazolin for now, will likely limit course  2. Dermatology input appreciated;  - Leg elevation and compression  - Recommend evaluating other measures to relieve lower extremity edema including consideration of diuretics (on lasix PO QD)  - Started pentoxifylline 400mg daily, will plan to titrate dose in the outpatient setting with Derm   - Started clobetasol 0.05% ointment BID to affected areas for up to 2 weeks on, then 1 week off  - Outpatient follow up with Dr. Fletcher at our clinic located at [address] [place] [address], [address], [place], [place] ([phone]). Dermatology to coordinate appointment.       72y female with a past history of COPD, DVT(no AC), Graves disease,HTN,HLD,partial left nephrectomy for renal cell cancer, parathyroidectomy, known metastatic renal cell cancer, who was admitted with B/L lower extremity edema and erythema.  She has been admitted in past for similar problem.  She is barely ambulatory. No fever or chills. She uses home O2 PRN.  This has been a chronic problem, has been getting worse .Cefazolin was started in ER.  LE dopplers negative.  Suspect combination of lymphedema, venous stasis, and perhaps lipodermatosclerosis as apposed to true cellulitis.   She is afebrile, has a normal wbc count, and has b/l leg involvement, worse on left.    1.Cefazolin, limited course completed 3/24/23  2. Dermatology input appreciated;  - Leg elevation and compression  - Recommend evaluating other measures to relieve lower extremity edema including consideration of diuretics (on lasix PO QD)  - Started pentoxifylline 400mg daily, will plan to titrate dose in the outpatient setting with Derm   - Started clobetasol 0.05% ointment BID to affected areas for up to 2 weeks on, then 1 week off  - Outpatient follow up with Dr. Fletcher at our clinic located at [address] [place] [address], [address], [place], [place] ([phone]). Dermatology to coordinate appointment.    Medically cleared for discharge with outpatient follow-up 72y female with a past history of COPD, DVT(no AC), Graves disease,HTN,HLD,partial left nephrectomy for renal cell cancer, parathyroidectomy, known metastatic renal cell cancer, who was admitted with B/L lower extremity edema and erythema.  She has been admitted in past for similar problem.  She is barely ambulatory. No fever or chills. She uses home O2 PRN.  This has been a chronic problem, has been getting worse .Cefazolin was started in ER.  LE dopplers negative.  Suspect combination of lymphedema, venous stasis, and perhaps lipodermatosclerosis as apposed to true cellulitis.   She is afebrile, has a normal wbc count, and has b/l leg involvement, worse on left.    1.Cefazolin, limited course completed 3/24/23  2. Dermatology input appreciated;  - Leg elevation and compression  - Recommend evaluating other measures to relieve lower extremity edema including consideration of diuretics (on lasix PO QD)  - Started pentoxifylline 400mg daily, will plan to titrate dose in the outpatient setting with Derm   - Started clobetasol 0.05% ointment BID to affected areas for up to 2 weeks on, then 1 week off  - Outpatient follow up with Dr. Feltcher at our clinic located at [address] [place] [address], [address], [place], [place] ([phone]). Dermatology to coordinate appointment.  Per cards; cardiac stable  edema likely 2/2 venous insufficiency, doubt volume overload/ADHF  c/w diuretics to maintain euvolemic  no definitive arrhythmia noted, ac on hold    Medically cleared for discharge with outpatient follow-up

## 2023-03-23 NOTE — DIETITIAN INITIAL EVALUATION ADULT - ADD RECOMMEND
1) Continue regular diet free of therapeutic restrictions as tolerated to promote adequate PO intake. Defer texture/consistency to SLP/team.   2) Recommend providing Multivitamin and vitamin C daily to promote wound healing pending no medical contraindications.  3) Continue to monitor PO intake, weight, labs, skin, GI status, and diet.  4) RD remains available for diet education PRN.  5) BMI sticker placed in chart.

## 2023-03-23 NOTE — DIETITIAN INITIAL EVALUATION ADULT - OTHER INFO
Pt refused discussion about weight, RD honored.   Dosing wt: 291 pounds (3/21).   Wt hx per chart: 282.2 pounds (3/22, bed). Per previous RD note: 233.9 pounds (1/13/21).   Weight fluctuations possible partially in setting of fluid shifts (pt prescribed diuretic and with severe edema). RD to continue to monitor weight trends as able.   Nutritionally Pertinent Meds in-house: Abx, atorvastatin, lasix, synthroid, lopressor, steroid.   Nutritionally Pertinent Labs: high BG.  - Cardio: edema likely secondary to venous insufficiency; on home lasix and in house.

## 2023-03-23 NOTE — DISCHARGE NOTE PROVIDER - DETAILS OF MALNUTRITION DIAGNOSIS/DIAGNOSES
This patient has been assessed with a concern for Malnutrition and was treated during this hospitalization for the following Nutrition diagnosis/diagnoses:     -  03/23/2023: Morbid obesity (BMI > 40)

## 2023-03-23 NOTE — DIETITIAN INITIAL EVALUATION ADULT - ORAL INTAKE PTA/DIET HISTORY
Pt reports good/normal appetite and PO intake PTA; reports consuming regular foods at home. Per patient profile, pt with food allergies to grape, peach, shellfish.

## 2023-03-23 NOTE — DISCHARGE NOTE PROVIDER - NSDCFUSCHEDAPPT_GEN_ALL_CORE_FT
Dane Salguero  [facility]  GASTRO [id number] [facility]  Scheduled Appointment: 05/08/2023

## 2023-03-23 NOTE — DIETITIAN INITIAL EVALUATION ADULT - PERTINENT MEDS FT
MEDICATIONS  (STANDING):  albuterol/ipratropium for Nebulization 3 milliLiter(s) Nebulizer every 6 hours  amLODIPine   Tablet 5 milliGRAM(s) Oral daily  aspirin enteric coated 81 milliGRAM(s) Oral daily  atorvastatin 40 milliGRAM(s) Oral at bedtime  busPIRone 10 milliGRAM(s) Oral <User Schedule>  ceFAZolin   IVPB      ceFAZolin   IVPB 2000 milliGRAM(s) IV Intermittent every 8 hours  chlorhexidine 2% Cloths 1 Application(s) Topical <User Schedule>  clobetasol 0.05% Ointment 1 Application(s) Topical two times a day  DULoxetine 60 milliGRAM(s) Oral daily  furosemide    Tablet 40 milliGRAM(s) Oral daily  gabapentin 100 milliGRAM(s) Oral two times a day  heparin   Injectable 5000 Unit(s) SubCutaneous every 8 hours  hydrALAZINE 75 milliGRAM(s) Oral two times a day  levothyroxine 125 MICROGram(s) Oral daily  losartan 100 milliGRAM(s) Oral daily  metoprolol tartrate 25 milliGRAM(s) Oral two times a day  montelukast 10 milliGRAM(s) Oral daily  OLANZapine 10 milliGRAM(s) Oral daily  pentoxifylline 400 milliGRAM(s) Oral daily  predniSONE   Tablet 5 milliGRAM(s) Oral daily  tiotropium 2.5 MICROgram(s) Inhaler 2 Puff(s) Inhalation daily    MEDICATIONS  (PRN):  acetaminophen     Tablet .. 650 milliGRAM(s) Oral every 6 hours PRN Temp greater or equal to 38.5C (101.3F), Mild Pain (1 - 3)  baclofen 5 milliGRAM(s) Oral every 8 hours PRN Muscle Spasm  melatonin 3 milliGRAM(s) Oral at bedtime PRN Insomnia  oxycodone    5 mG/acetaminophen 325 mG 1 Tablet(s) Oral every 6 hours PRN Moderate Pain (4 - 6)

## 2023-03-23 NOTE — DIETITIAN INITIAL EVALUATION ADULT - REASON INDICATOR FOR ASSESSMENT
Nutrition Consult for pressure injury stage 2 or >.   Source: Pt, Electronic Medical Record.   Chart reviewed, events noted.

## 2023-03-24 ENCOUNTER — TRANSCRIPTION ENCOUNTER (OUTPATIENT)
Age: 73
End: 2023-03-24

## 2023-03-24 VITALS
DIASTOLIC BLOOD PRESSURE: 76 MMHG | OXYGEN SATURATION: 96 % | TEMPERATURE: 99 F | HEART RATE: 68 BPM | RESPIRATION RATE: 18 BRPM | SYSTOLIC BLOOD PRESSURE: 133 MMHG

## 2023-03-24 LAB
ANION GAP SERPL CALC-SCNC: 10 MMOL/L — SIGNIFICANT CHANGE UP (ref 5–17)
BUN SERPL-MCNC: 8 MG/DL — SIGNIFICANT CHANGE UP (ref 7–23)
CALCIUM SERPL-MCNC: 10 MG/DL — SIGNIFICANT CHANGE UP (ref 8.4–10.5)
CHLORIDE SERPL-SCNC: 97 MMOL/L — SIGNIFICANT CHANGE UP (ref 96–108)
CO2 SERPL-SCNC: 35 MMOL/L — HIGH (ref 22–31)
CREAT SERPL-MCNC: 0.59 MG/DL — SIGNIFICANT CHANGE UP (ref 0.5–1.3)
EGFR: 95 ML/MIN/1.73M2 — SIGNIFICANT CHANGE UP
GLUCOSE SERPL-MCNC: 146 MG/DL — HIGH (ref 70–99)
HCT VFR BLD CALC: 37.9 % — SIGNIFICANT CHANGE UP (ref 34.5–45)
HGB BLD-MCNC: 11.8 G/DL — SIGNIFICANT CHANGE UP (ref 11.5–15.5)
MCHC RBC-ENTMCNC: 30.7 PG — SIGNIFICANT CHANGE UP (ref 27–34)
MCHC RBC-ENTMCNC: 31.1 GM/DL — LOW (ref 32–36)
MCV RBC AUTO: 98.7 FL — SIGNIFICANT CHANGE UP (ref 80–100)
NRBC # BLD: 0 /100 WBCS — SIGNIFICANT CHANGE UP (ref 0–0)
PLATELET # BLD AUTO: 162 K/UL — SIGNIFICANT CHANGE UP (ref 150–400)
POTASSIUM SERPL-MCNC: 3.3 MMOL/L — LOW (ref 3.5–5.3)
POTASSIUM SERPL-SCNC: 3.3 MMOL/L — LOW (ref 3.5–5.3)
RBC # BLD: 3.84 M/UL — SIGNIFICANT CHANGE UP (ref 3.8–5.2)
RBC # FLD: 15 % — HIGH (ref 10.3–14.5)
SODIUM SERPL-SCNC: 142 MMOL/L — SIGNIFICANT CHANGE UP (ref 135–145)
WBC # BLD: 7.76 K/UL — SIGNIFICANT CHANGE UP (ref 3.8–10.5)
WBC # FLD AUTO: 7.76 K/UL — SIGNIFICANT CHANGE UP (ref 3.8–10.5)

## 2023-03-24 PROCEDURE — 93308 TTE F-UP OR LMTD: CPT

## 2023-03-24 PROCEDURE — 93321 DOPPLER ECHO F-UP/LMTD STD: CPT

## 2023-03-24 PROCEDURE — 87040 BLOOD CULTURE FOR BACTERIA: CPT

## 2023-03-24 PROCEDURE — 83735 ASSAY OF MAGNESIUM: CPT

## 2023-03-24 PROCEDURE — 82553 CREATINE MB FRACTION: CPT

## 2023-03-24 PROCEDURE — 97530 THERAPEUTIC ACTIVITIES: CPT

## 2023-03-24 PROCEDURE — 71045 X-RAY EXAM CHEST 1 VIEW: CPT

## 2023-03-24 PROCEDURE — 93970 EXTREMITY STUDY: CPT

## 2023-03-24 PROCEDURE — 87046 STOOL CULTR AEROBIC BACT EA: CPT

## 2023-03-24 PROCEDURE — 82803 BLOOD GASES ANY COMBINATION: CPT

## 2023-03-24 PROCEDURE — 85610 PROTHROMBIN TIME: CPT

## 2023-03-24 PROCEDURE — 36415 COLL VENOUS BLD VENIPUNCTURE: CPT

## 2023-03-24 PROCEDURE — 82330 ASSAY OF CALCIUM: CPT

## 2023-03-24 PROCEDURE — 85730 THROMBOPLASTIN TIME PARTIAL: CPT

## 2023-03-24 PROCEDURE — 87641 MR-STAPH DNA AMP PROBE: CPT

## 2023-03-24 PROCEDURE — 94640 AIRWAY INHALATION TREATMENT: CPT

## 2023-03-24 PROCEDURE — 80053 COMPREHEN METABOLIC PANEL: CPT

## 2023-03-24 PROCEDURE — 85027 COMPLETE CBC AUTOMATED: CPT

## 2023-03-24 PROCEDURE — 85018 HEMOGLOBIN: CPT

## 2023-03-24 PROCEDURE — 87077 CULTURE AEROBIC IDENTIFY: CPT

## 2023-03-24 PROCEDURE — 84443 ASSAY THYROID STIM HORMONE: CPT

## 2023-03-24 PROCEDURE — 83605 ASSAY OF LACTIC ACID: CPT

## 2023-03-24 PROCEDURE — 93005 ELECTROCARDIOGRAM TRACING: CPT

## 2023-03-24 PROCEDURE — 97110 THERAPEUTIC EXERCISES: CPT

## 2023-03-24 PROCEDURE — 84484 ASSAY OF TROPONIN QUANT: CPT

## 2023-03-24 PROCEDURE — 83880 ASSAY OF NATRIURETIC PEPTIDE: CPT

## 2023-03-24 PROCEDURE — 87045 FECES CULTURE AEROBIC BACT: CPT

## 2023-03-24 PROCEDURE — 85014 HEMATOCRIT: CPT

## 2023-03-24 PROCEDURE — 99285 EMERGENCY DEPT VISIT HI MDM: CPT | Mod: 25

## 2023-03-24 PROCEDURE — 82435 ASSAY OF BLOOD CHLORIDE: CPT

## 2023-03-24 PROCEDURE — 82947 ASSAY GLUCOSE BLOOD QUANT: CPT

## 2023-03-24 PROCEDURE — 87640 STAPH A DNA AMP PROBE: CPT

## 2023-03-24 PROCEDURE — 84132 ASSAY OF SERUM POTASSIUM: CPT

## 2023-03-24 PROCEDURE — 80048 BASIC METABOLIC PNL TOTAL CA: CPT

## 2023-03-24 PROCEDURE — 85025 COMPLETE CBC W/AUTO DIFF WBC: CPT

## 2023-03-24 PROCEDURE — 87637 SARSCOV2&INF A&B&RSV AMP PRB: CPT

## 2023-03-24 PROCEDURE — 82550 ASSAY OF CK (CPK): CPT

## 2023-03-24 PROCEDURE — 84295 ASSAY OF SERUM SODIUM: CPT

## 2023-03-24 RX ORDER — ALBUTEROL 90 UG/1
2 AEROSOL, METERED ORAL
Qty: 1 | Refills: 0
Start: 2023-03-24 | End: 2023-04-22

## 2023-03-24 RX ORDER — BACLOFEN 100 %
1 POWDER (GRAM) MISCELLANEOUS
Qty: 21 | Refills: 0
Start: 2023-03-24 | End: 2023-03-30

## 2023-03-24 RX ORDER — AMLODIPINE BESYLATE 2.5 MG/1
1 TABLET ORAL
Qty: 30 | Refills: 0
Start: 2023-03-24 | End: 2023-04-22

## 2023-03-24 RX ORDER — PROPRANOLOL HCL 160 MG
1 CAPSULE, EXTENDED RELEASE 24HR ORAL
Qty: 0 | Refills: 0 | DISCHARGE

## 2023-03-24 RX ORDER — METOPROLOL TARTRATE 50 MG
1 TABLET ORAL
Qty: 60 | Refills: 0
Start: 2023-03-24 | End: 2023-04-22

## 2023-03-24 RX ORDER — PENTOXIFYLLINE 400 MG
1 TABLET, EXTENDED RELEASE ORAL
Qty: 30 | Refills: 0
Start: 2023-03-24 | End: 2023-04-22

## 2023-03-24 RX ORDER — POTASSIUM CHLORIDE 20 MEQ
40 PACKET (EA) ORAL ONCE
Refills: 0 | Status: COMPLETED | OUTPATIENT
Start: 2023-03-24 | End: 2023-03-24

## 2023-03-24 RX ORDER — POTASSIUM CHLORIDE 20 MEQ
40 PACKET (EA) ORAL ONCE
Refills: 0 | Status: DISCONTINUED | OUTPATIENT
Start: 2023-03-24 | End: 2023-03-24

## 2023-03-24 RX ORDER — BACLOFEN 100 %
1 POWDER (GRAM) MISCELLANEOUS
Qty: 0 | Refills: 0 | DISCHARGE

## 2023-03-24 RX ADMIN — TIOTROPIUM BROMIDE 2 PUFF(S): 18 CAPSULE ORAL; RESPIRATORY (INHALATION) at 13:32

## 2023-03-24 RX ADMIN — CHLORHEXIDINE GLUCONATE 1 APPLICATION(S): 213 SOLUTION TOPICAL at 05:30

## 2023-03-24 RX ADMIN — Medication 25 MILLIGRAM(S): at 18:32

## 2023-03-24 RX ADMIN — Medication 3 MILLILITER(S): at 12:32

## 2023-03-24 RX ADMIN — Medication 25 MILLIGRAM(S): at 05:33

## 2023-03-24 RX ADMIN — Medication 125 MICROGRAM(S): at 04:32

## 2023-03-24 RX ADMIN — Medication 1 APPLICATION(S): at 18:31

## 2023-03-24 RX ADMIN — Medication 5 MILLIGRAM(S): at 05:33

## 2023-03-24 RX ADMIN — AMLODIPINE BESYLATE 5 MILLIGRAM(S): 2.5 TABLET ORAL at 05:30

## 2023-03-24 RX ADMIN — DULOXETINE HYDROCHLORIDE 60 MILLIGRAM(S): 30 CAPSULE, DELAYED RELEASE ORAL at 13:31

## 2023-03-24 RX ADMIN — Medication 100 MILLIGRAM(S): at 05:29

## 2023-03-24 RX ADMIN — Medication 3 MILLILITER(S): at 05:33

## 2023-03-24 RX ADMIN — MONTELUKAST 10 MILLIGRAM(S): 4 TABLET, CHEWABLE ORAL at 13:33

## 2023-03-24 RX ADMIN — HEPARIN SODIUM 5000 UNIT(S): 5000 INJECTION INTRAVENOUS; SUBCUTANEOUS at 13:37

## 2023-03-24 RX ADMIN — Medication 3 MILLILITER(S): at 18:26

## 2023-03-24 RX ADMIN — Medication 3 MILLILITER(S): at 00:46

## 2023-03-24 RX ADMIN — LOSARTAN POTASSIUM 100 MILLIGRAM(S): 100 TABLET, FILM COATED ORAL at 05:33

## 2023-03-24 RX ADMIN — Medication 10 MILLIGRAM(S): at 09:14

## 2023-03-24 RX ADMIN — OLANZAPINE 10 MILLIGRAM(S): 15 TABLET, FILM COATED ORAL at 13:34

## 2023-03-24 RX ADMIN — Medication 400 MILLIGRAM(S): at 13:35

## 2023-03-24 RX ADMIN — Medication 81 MILLIGRAM(S): at 13:30

## 2023-03-24 RX ADMIN — Medication 1 APPLICATION(S): at 05:39

## 2023-03-24 RX ADMIN — GABAPENTIN 100 MILLIGRAM(S): 400 CAPSULE ORAL at 05:30

## 2023-03-24 RX ADMIN — Medication 40 MILLIGRAM(S): at 05:30

## 2023-03-24 RX ADMIN — Medication 40 MILLIEQUIVALENT(S): at 10:48

## 2023-03-24 RX ADMIN — Medication 75 MILLIGRAM(S): at 05:31

## 2023-03-24 RX ADMIN — HEPARIN SODIUM 5000 UNIT(S): 5000 INJECTION INTRAVENOUS; SUBCUTANEOUS at 05:31

## 2023-03-24 RX ADMIN — Medication 75 MILLIGRAM(S): at 18:31

## 2023-03-24 NOTE — PHARMACOTHERAPY INTERVENTION NOTE - COMMENTS
Counseled patient at bedside on the following inpatient/discharge medications names (brand/generic), indication, and possible side effects:  albuterol 90mcg HFA inhaler, 2puff Q6H PRN  amlodipine 5mg daily  baclofen 5mg Q8H PRN  clobetasol cream  Lasix 20mg daily  metoprolol tartrate 25mg twice daily  pentoxifylline 400mg daily    Medication schedule created for patient. All meds picked up from [facility] pharmacy and provided to pt at bedside ([id number]).    Patient was provided with a medication card for their new medication. Patient questions and concerns were answered and addressed. Patient demonstrated understanding.    Nabila Albarran, PharmD, [facility]  Clinical Pharmacy Specialist  [phone] or Teams

## 2023-03-24 NOTE — ADVANCED PRACTICE NURSE CONSULT - ASSESSMENT
When wound care RN arrived on unit, patient was found lying in a low air loss pressure redistribution support surface style bed with her  at bedside. Patient was alert and oriented and gave consent to skin consult. Ms To reports being wheelchair bound and is unable to turn independently, staff assistance x 1 was provided. Once turned, both fecal and urinary incontinence was apparent and pericare was provided. Once cleaned, the wound care RN was able to visualize an area of persistent nonblanchable purple hued discoloration extending from B/L buttocks/sacrum to B/L ischium, total area measures approximately 30cm x 25cm x 0cm. Once consult was complete, patient and family were educated regarding the need for routine turning and positioning to prevent pressure injuries and patient was placed in an upright fashion so she can drink her beverage and physical therapy can consult.

## 2023-03-24 NOTE — DISCHARGE NOTE NURSING/CASE MANAGEMENT/SOCIAL WORK - NSDCVIVACCINE_GEN_ALL_CORE_FT
Tdap; 19-Nov-2019 16:31; Christine Skaggs (RN); [employer]; [id number] (Exp. Date: 17-Sep-2021); IntraMuscular; Deltoid Left.; 0.5 milliLiter(s); VIS (VIS Published: 09-May-2013, VIS Presented: 19-Nov-2019);

## 2023-03-24 NOTE — PROGRESS NOTE ADULT - SUBJECTIVE AND OBJECTIVE BOX
[facility]      Seen earlier today [Please note that date of entry above  is the actual  DATE OF SERVICE] No new neurological symptoms reported. Remains stable neurologically.   - Today, patient is without complaints.          *****MEDICATIONS: Current medication reviewed and documented.    MEDICATIONS  (STANDING):  albuterol/ipratropium for Nebulization 3 milliLiter(s) Nebulizer every 6 hours  amLODIPine   Tablet 5 milliGRAM(s) Oral daily  aspirin enteric coated 81 milliGRAM(s) Oral daily  atorvastatin 40 milliGRAM(s) Oral at bedtime  busPIRone 10 milliGRAM(s) Oral <User Schedule>  ceFAZolin   IVPB      ceFAZolin   IVPB 2000 milliGRAM(s) IV Intermittent every 8 hours  chlorhexidine 2% Cloths 1 Application(s) Topical <User Schedule>  clobetasol 0.05% Ointment 1 Application(s) Topical two times a day  DULoxetine 60 milliGRAM(s) Oral daily  furosemide    Tablet 40 milliGRAM(s) Oral daily  gabapentin 100 milliGRAM(s) Oral two times a day  heparin   Injectable 5000 Unit(s) SubCutaneous every 8 hours  hydrALAZINE 75 milliGRAM(s) Oral two times a day  levothyroxine 125 MICROGram(s) Oral daily  losartan 100 milliGRAM(s) Oral daily  metoprolol tartrate 25 milliGRAM(s) Oral two times a day  montelukast 10 milliGRAM(s) Oral daily  OLANZapine 10 milliGRAM(s) Oral daily  pentoxifylline 400 milliGRAM(s) Oral daily  predniSONE   Tablet 5 milliGRAM(s) Oral daily  tiotropium 2.5 MICROgram(s) Inhaler 2 Puff(s) Inhalation daily    MEDICATIONS  (PRN):  acetaminophen     Tablet .. 650 milliGRAM(s) Oral every 6 hours PRN Temp greater or equal to 38.5C (101.3F), Mild Pain (1 - 3)  baclofen 5 milliGRAM(s) Oral every 8 hours PRN Muscle Spasm  melatonin 3 milliGRAM(s) Oral at bedtime PRN Insomnia  oxycodone    5 mG/acetaminophen 325 mG 1 Tablet(s) Oral every 6 hours PRN Moderate Pain (4 - 6)          ***** VITAL SIGNS:   Vital Signs Last 24 Hrs       T(F): 97.3 (23 @ 04:59), Max: 99.3 (23 @ 12:36)  HR: 73 (23 @ 04:59) (65 - 89)  BP: 161/83 (23 @ 04:59) (127/71 - 161/83)  RR: 18 (23 @ 04:59) (18 - 18)  SpO2: 97% (23 @ 04:59) (97% - 97%)  Wt(kg): --  I&O's Summary    23 Mar 2023 07:01  -  24 Mar 2023 07:00  --------------------------------------------------------  IN: 350 mL / OUT: 3150 mL / NET: -2800 mL             *****PHYSICAL EXAM: Alert oriented x2   Attention comprehension are fair. Able to name, repeat, read without any difficulty.   Able to follow 3 step commands.     EOMI fundi not visualized,  blinks to threat   No facial asymmetry   Tongue is midline   Palate elevates symmetrically   paraparetic  b/l upper ext are 4/5     Reflexes are  diminished  throughout   Cogwheeling noted   sensation is grossly symmetric  Gait : not assessed.  B/L down going toes              *****LAB AND IMAGIN.8   7.76  )-----------( 162      ( 24 Mar 2023 06:57 )             37.9                   142  |  97  |  8   ----------------------------<  146<H>  3.3<L>   |  35<H>  |  0.59    Ca    10.0      24 Mar 2023 07:06  Mg     1.8                                [All pertinent recent Imaging/Reports reviewed]            *****A S S E S S M E N T   A N D   P L A N :  Excerpt from H&P,"   72 year-old-female with history of COPD ( on home O2), hx of DVT not on AC, Graves disease, HLD, HTN, hypothyroidism, breast cancer in situ, parathyroidectomy, kidney cancer w/mets s/p L partial nephrectomy (not on chemo/RT), obesity, tremors, and chronic lymphedema now presenting to the ED via EMS for worsening b/l lower extremity swelling x 1 month.   Has home health aide daily 9.5 hours. Pt compliant with lasix medication. Pt brought to ED given worsening leg swelling and erythema. States was seen last time for similar presentation. Arrives on home O2 (2L). Deneis chest pain, sob, abdominal pain, nausea/vomiting, fever/chills. Did not take lasix medication today.     tremor of unclear etiology  related to graves vs. elevated pco2 level   consider changing albuterol to xopenex     abg with elevated  co2 likely causing recent change   defer to pulm to manage ? need bipap   can consider checking thyroid antibodies vs. following up with endocrine             pt at risk for developing delirium, therefore please institute the following preventative measures if possible          - initiating early mobilization          -minimizing "tethers" - IV, oxygen, catheters, etc          -avoiding   sedatives          -maintaining hydration/nutrition          -avoid anticholinergics - diphenhydramine, etc          -pain control          -sleep wake cycle regulation; avoid day time somnolence           -supportive environment     Thank you for allowing me to participate in the care of this patient. Will continue to follow patient periodically. Please do not hesitate to call me if you have any  questions or if there has been a change in patients neurological status     ________________  Lizeth Barron MD  [facility] ([facility]  [phone]      33 minutes spent on total encounter; more than 50 % of the visit was  spent counseling about plan of care, compliance to diet/exercise and medication regimen and or  coordinating care by the attending physician.      It is advised that stroke patients follow up with DEMARCO Poole @ [phone] in 1- 2 weeks.   Others please follow up with Dr. Michael Nissenbaum [phone]
Patient is a 72y old  Female who presents with a chief complaint of Cellulitis    Per chart: 72 year-old-female with history of COPD (not on home O2), hx of DVT not on AC, Graves disease, HLD, HTN, hypothyroidism, breast cancer in situ, parathyroidectomy, kidney cancer w/mets s/p L partial nephrectomy (not on chemo/RT), obesity, tremors, and chronic lymphedema now presenting to the ED via EMS for worsening b/l lower extremity swelling x 1 month. Patient with decreased ambulatory status during this time frame, uses walker at baseline to ambulate. Has home health aide daily 9.5 hours. Pt compliant with lasix medication. Pt brought to ED given worsening leg swelling and erythema. (23 Mar 2023 15:02)      SUBJECTIVE / OVERNIGHT EVENTS: Comfortable without new complaints. Son at bedside.   Review of Systems  chest pain no  palpitations no  sob no  nausea no  headache no    MEDICATIONS  (STANDING):  albuterol/ipratropium for Nebulization 3 milliLiter(s) Nebulizer every 6 hours  amLODIPine   Tablet 5 milliGRAM(s) Oral daily  aspirin enteric coated 81 milliGRAM(s) Oral daily  atorvastatin 40 milliGRAM(s) Oral at bedtime  busPIRone 10 milliGRAM(s) Oral <User Schedule>  ceFAZolin   IVPB      ceFAZolin   IVPB 2000 milliGRAM(s) IV Intermittent every 8 hours  chlorhexidine 2% Cloths 1 Application(s) Topical <User Schedule>  clobetasol 0.05% Ointment 1 Application(s) Topical two times a day  DULoxetine 60 milliGRAM(s) Oral daily  furosemide    Tablet 40 milliGRAM(s) Oral daily  gabapentin 100 milliGRAM(s) Oral two times a day  heparin   Injectable 5000 Unit(s) SubCutaneous every 8 hours  hydrALAZINE 75 milliGRAM(s) Oral two times a day  levothyroxine 125 MICROGram(s) Oral daily  losartan 100 milliGRAM(s) Oral daily  metoprolol tartrate 25 milliGRAM(s) Oral two times a day  montelukast 10 milliGRAM(s) Oral daily  OLANZapine 10 milliGRAM(s) Oral daily  pentoxifylline 400 milliGRAM(s) Oral daily  predniSONE   Tablet 5 milliGRAM(s) Oral daily  tiotropium 2.5 MICROgram(s) Inhaler 2 Puff(s) Inhalation daily    MEDICATIONS  (PRN):  acetaminophen     Tablet .. 650 milliGRAM(s) Oral every 6 hours PRN Temp greater or equal to 38.5C (101.3F), Mild Pain (1 - 3)  baclofen 5 milliGRAM(s) Oral every 8 hours PRN Muscle Spasm  melatonin 3 milliGRAM(s) Oral at bedtime PRN Insomnia  oxycodone    5 mG/acetaminophen 325 mG 1 Tablet(s) Oral every 6 hours PRN Moderate Pain (4 - 6)      Vital Signs Last 24 Hrs  T(C): 37.4 (23 Mar 2023 12:36), Max: 37.4 (23 Mar 2023 12:36)  T(F): 99.3 (23 Mar 2023 12:36), Max: 99.3 (23 Mar 2023 12:36)  HR: 89 (23 Mar 2023 16:53) (71 - 89)  BP: 127/71 (23 Mar 2023 16:53) (127/71 - 162/69)  BP(mean): --  RR: 18 (23 Mar 2023 12:36) (18 - 18)  SpO2: 97% (23 Mar 2023 12:36) (96% - 97%)    Parameters below as of 23 Mar 2023 12:36  Patient On (Oxygen Delivery Method): nasal cannula  O2 Flow (L/min): 2.5      PHYSICAL EXAM:  GENERAL: NAD  HEAD:  Atraumatic, Normocephalic  EYES: EOMI, PERRLA, conjunctiva and sclera clear  NECK: Supple, No JVD  CHEST/LUNG: Clear to auscultation bilaterally; No wheeze  HEART: Regular rate and rhythm; No murmurs, rubs, or gallops  ABDOMEN: Soft, Nontender, Nondistended; Bowel sounds present  EXTREMITIES:  2-3+ bipedal edema erythema improving   PSYCH: AAOx3  NEUROLOGY: non-focal  SKIN: No rashes or lesions    LABS:                        11.9   8.50  )-----------( 154      ( 23 Mar 2023 12:07 )             38.0     03-23    141  |  99  |  10  ----------------------------<  167<H>  4.6   |  30  |  0.54    Ca    9.7      23 Mar 2023 12:16  Mg     1.8     03-23      PTT - ( 22 Mar 2023 09:55 )  PTT:>200.0 sec          Culture - Blood (collected 21 Mar 2023 17:20)  Source: .Blood Blood  Preliminary Report (22 Mar 2023 22:03):    No growth to date.    Culture - Blood (collected 21 Mar 2023 17:15)  Source: .Blood Blood  Preliminary Report (22 Mar 2023 22:03):    No growth to date.        RADIOLOGY & ADDITIONAL TESTS:    Imaging Personally Reviewed:    Consultant(s) Notes Reviewed:      Care Discussed with Consultants/Other Providers:  
Cardiovascular Disease Progress Note    Overnight events: No acute events overnight.  no cp/sob/palps  Otherwise review of systems negative    Objective Findings:  T(C): 36.3 (03-24-23 @ 04:59), Max: 37.4 (03-23-23 @ 12:36)  HR: 73 (03-24-23 @ 04:59) (65 - 89)  BP: 161/83 (03-24-23 @ 04:59) (127/71 - 161/83)  RR: 18 (03-24-23 @ 04:59) (18 - 18)  SpO2: 97% (03-24-23 @ 04:59) (97% - 97%)  Wt(kg): --  Daily     Daily       Physical Exam:  Gen: NAD  HEENT: EOMI  CV: RRR, normal S1 + S2, no m/r/g  Lungs: CTAB  Abd: soft, non-tender  Ext: No edema    Telemetry:    Laboratory Data:                        11.9   8.50  )-----------( 154      ( 23 Mar 2023 12:07 )             38.0     03-23    141  |  99  |  10  ----------------------------<  167<H>  4.6   |  30  |  0.54    Ca    9.7      23 Mar 2023 12:16  Mg     1.8     03-23      PTT - ( 22 Mar 2023 09:55 )  PTT:>200.0 sec          Inpatient Medications:  MEDICATIONS  (STANDING):  albuterol/ipratropium for Nebulization 3 milliLiter(s) Nebulizer every 6 hours  amLODIPine   Tablet 5 milliGRAM(s) Oral daily  aspirin enteric coated 81 milliGRAM(s) Oral daily  atorvastatin 40 milliGRAM(s) Oral at bedtime  busPIRone 10 milliGRAM(s) Oral <User Schedule>  ceFAZolin   IVPB      ceFAZolin   IVPB 2000 milliGRAM(s) IV Intermittent every 8 hours  chlorhexidine 2% Cloths 1 Application(s) Topical <User Schedule>  clobetasol 0.05% Ointment 1 Application(s) Topical two times a day  DULoxetine 60 milliGRAM(s) Oral daily  furosemide    Tablet 40 milliGRAM(s) Oral daily  gabapentin 100 milliGRAM(s) Oral two times a day  heparin   Injectable 5000 Unit(s) SubCutaneous every 8 hours  hydrALAZINE 75 milliGRAM(s) Oral two times a day  levothyroxine 125 MICROGram(s) Oral daily  losartan 100 milliGRAM(s) Oral daily  metoprolol tartrate 25 milliGRAM(s) Oral two times a day  montelukast 10 milliGRAM(s) Oral daily  OLANZapine 10 milliGRAM(s) Oral daily  pentoxifylline 400 milliGRAM(s) Oral daily  predniSONE   Tablet 5 milliGRAM(s) Oral daily  tiotropium 2.5 MICROgram(s) Inhaler 2 Puff(s) Inhalation daily      Assessment:  72 year-old-female with history of COPD (not on home O2), hx of DVT not on AC, Graves disease, HLD, HTN, hypothyroidism, breast cancer in situ, parathyroidectomy, kidney cancer w/mets s/p L partial nephrectomy (not on chemo/RT), obesity, tremors, and chronic lymphedema now presenting to the ED via EMS for worsening b/l lower extremity swelling x 1 month    Recs:  cardiac stable  edema likely 2/2 venous insufficiency, doubt volume overload/ADHF  cw diuretics to maintain euvolemic  rec compression socks, ace wraps and leg elevation  prev lower ext venous duplex neg for dvt  no definitive arrhythmia noted, ac on hold  care per medicine      Over 25 minutes spent on total encounter; more than 50% of the visit was spent counseling and/or coordinating care by the attending physician.      Juan Salcedo MD   Cardiovascular Disease  [phone]
Cardiovascular Disease Progress Note    Overnight events: No acute events overnight.  no cp/sob/palps/dizziness  Otherwise review of systems negative    Objective Findings:  T(C): 36.3 (23 @ 04:22), Max: 36.6 (23 @ 21:06)  HR: 74 (23 @ 04:22) (71 - 81)  BP: 149/73 (23 @ 04:22) (131/81 - 162/69)  RR: 18 (23 @ 04:22) (18 - 18)  SpO2: 97% (23 @ 04:22) (96% - 97%)  Wt(kg): --  Daily     Daily Weight in k (22 Mar 2023 12:37)      Physical Exam:  Gen: NAD  HEENT: EOMI  CV: RRR, normal S1 + S2, no m/r/g  Lungs: CTAB  Abd: soft, non-tender  Ext: No edema    Telemetry:    Laboratory Data:                        11.7   11.60 )-----------( 172      ( 22 Mar 2023 09:55 )             38.0     -    141  |  99  |  15  ----------------------------<  134<H>  4.1   |  30  |  0.70    Ca    9.9      22 Mar 2023 07:03    TPro  6.4  /  Alb  3.6  /  TBili  0.6  /  DBili  x   /  AST  18  /  ALT  14  /  AlkPhos  103  03-21    PT/INR - ( 21 Mar 2023 17:38 )   PT: 13.5 sec;   INR: 1.16 ratio         PTT - ( 22 Mar 2023 09:55 )  PTT:>200.0 sec  CARDIAC MARKERS ( 21 Mar 2023 17:38 )  x     / x     / 126 U/L / x     / 1.8 ng/mL          Inpatient Medications:  MEDICATIONS  (STANDING):  albuterol/ipratropium for Nebulization 3 milliLiter(s) Nebulizer every 6 hours  amLODIPine   Tablet 5 milliGRAM(s) Oral daily  aspirin enteric coated 81 milliGRAM(s) Oral daily  atorvastatin 40 milliGRAM(s) Oral at bedtime  busPIRone 10 milliGRAM(s) Oral <User Schedule>  ceFAZolin   IVPB      ceFAZolin   IVPB 2000 milliGRAM(s) IV Intermittent every 8 hours  chlorhexidine 2% Cloths 1 Application(s) Topical <User Schedule>  clobetasol 0.05% Ointment 1 Application(s) Topical two times a day  DULoxetine 60 milliGRAM(s) Oral daily  furosemide    Tablet 40 milliGRAM(s) Oral daily  gabapentin 100 milliGRAM(s) Oral two times a day  heparin   Injectable 5000 Unit(s) SubCutaneous every 8 hours  hydrALAZINE 75 milliGRAM(s) Oral two times a day  levothyroxine 125 MICROGram(s) Oral daily  losartan 100 milliGRAM(s) Oral daily  metoprolol tartrate 25 milliGRAM(s) Oral two times a day  montelukast 10 milliGRAM(s) Oral daily  OLANZapine 10 milliGRAM(s) Oral daily  pentoxifylline 400 milliGRAM(s) Oral daily  predniSONE   Tablet 5 milliGRAM(s) Oral daily  tiotropium 2.5 MICROgram(s) Inhaler 2 Puff(s) Inhalation daily      Assessment:  72 year-old-female with history of COPD (not on home O2), hx of DVT not on AC, Graves disease, HLD, HTN, hypothyroidism, breast cancer in situ, parathyroidectomy, kidney cancer w/mets s/p L partial nephrectomy (not on chemo/RT), obesity, tremors, and chronic lymphedema now presenting to the ED via EMS for worsening b/l lower extremity swelling x 1 month    Recs:  cardiac stable  edema likely 2/2 venous insufficiency, doubt volume overload/ADHF  cw diuretics to maintain euvolemic  rec compression socks, ace wraps and leg elevation  prev lower ext venous duplex neg for dvt  no definitive arrhythmia noted, ac on hold  care per medicine          Over 25 minutes spent on total encounter; more than 50% of the visit was spent counseling and/or coordinating care by the attending physician.      Juan Salcedo MD   Cardiovascular Disease  [phone]
CC: f/u for LE edema/erythema    Patient reports legs are about the same, no fevers, no chills    REVIEW OF SYSTEMS: no fevers, no chills, no nausea, no vomiting, no abd pain, no resp symptoms  All other review of systems negative (Comprehensive ROS)    Antimicrobials Day # 3   ceFAZolin   IVPB      ceFAZolin   IVPB 2000 milliGRAM(s) IV Intermittent every 8 hours    Other Medications Reviewed    T(F): 99.3 (03-23-23 @ 12:36), Max: 99.3 (03-23-23 @ 12:36)  HR: 76 (03-23-23 @ 12:36)  BP: 157/78 (03-23-23 @ 12:36)  RR: 18 (03-23-23 @ 12:36)  SpO2: 97% (03-23-23 @ 12:36)    PHYSICAL EXAM:  General: alert, no acute distress, on nasal oxygen  Eyes:  anicteric, no conjunctival injection, no discharge  Oropharynx: no lesions or injection 	  Neck: supple, without adenopathy  Lungs: clear to auscultation  Heart: regular rate and rhythm; no murmur, rubs or gallops  Abdomen: soft, nondistended, nontender, without mass or organomegaly  Skin: B/L venous stasis changes, left > Rt  Extremities: no clubbing, cyanosis, + edema, component of lymphedema.  Neurologic: alert, oriented, moves all extremities  LAB RESULTS:                        11.9   8.50  )-----------( 154      ( 23 Mar 2023 12:07 )             38.0     03-23    141  |  99  |  10  ----------------------------<  167<H>  4.6   |  30  |  0.54    Ca    9.7      23 Mar 2023 12:16  Mg     1.8     03-23            MICROBIOLOGY REVIEWED:    RADIOLOGY REVIEWED:    
Patient is a 72y old  Female who presents with a chief complaint of leg edema (24 Mar 2023 12:59)      SUBJECTIVE / OVERNIGHT EVENTS: Comfortable without new complaints.   Review of Systems  chest pain no  palpitations no  sob no  nausea no  headache no    MEDICATIONS  (STANDING):  albuterol/ipratropium for Nebulization 3 milliLiter(s) Nebulizer every 6 hours  amLODIPine   Tablet 5 milliGRAM(s) Oral daily  aspirin enteric coated 81 milliGRAM(s) Oral daily  atorvastatin 40 milliGRAM(s) Oral at bedtime  busPIRone 10 milliGRAM(s) Oral <User Schedule>  chlorhexidine 2% Cloths 1 Application(s) Topical <User Schedule>  clobetasol 0.05% Ointment 1 Application(s) Topical two times a day  DULoxetine 60 milliGRAM(s) Oral daily  furosemide    Tablet 40 milliGRAM(s) Oral daily  gabapentin 100 milliGRAM(s) Oral two times a day  heparin   Injectable 5000 Unit(s) SubCutaneous every 8 hours  hydrALAZINE 75 milliGRAM(s) Oral two times a day  levothyroxine 125 MICROGram(s) Oral daily  losartan 100 milliGRAM(s) Oral daily  metoprolol tartrate 25 milliGRAM(s) Oral two times a day  montelukast 10 milliGRAM(s) Oral daily  OLANZapine 10 milliGRAM(s) Oral daily  pentoxifylline 400 milliGRAM(s) Oral daily  predniSONE   Tablet 5 milliGRAM(s) Oral daily  tiotropium 2.5 MICROgram(s) Inhaler 2 Puff(s) Inhalation daily    MEDICATIONS  (PRN):  acetaminophen     Tablet .. 650 milliGRAM(s) Oral every 6 hours PRN Temp greater or equal to 38.5C (101.3F), Mild Pain (1 - 3)  baclofen 5 milliGRAM(s) Oral every 8 hours PRN Muscle Spasm  melatonin 3 milliGRAM(s) Oral at bedtime PRN Insomnia  oxycodone    5 mG/acetaminophen 325 mG 1 Tablet(s) Oral every 6 hours PRN Moderate Pain (4 - 6)      Vital Signs Last 24 Hrs  T(C): 37.4 (24 Mar 2023 20:40), Max: 37.4 (24 Mar 2023 20:40)  T(F): 99.3 (24 Mar 2023 20:40), Max: 99.3 (24 Mar 2023 20:40)  HR: 68 (24 Mar 2023 20:40) (68 - 88)  BP: 133/76 (24 Mar 2023 20:40) (133/76 - 173/76)  BP(mean): --  RR: 18 (24 Mar 2023 20:40) (18 - 18)  SpO2: 96% (24 Mar 2023 20:40) (96% - 97%)    Parameters below as of 24 Mar 2023 20:40  Patient On (Oxygen Delivery Method): nasal cannula  O2 Flow (L/min): 2      PHYSICAL EXAM:  GENERAL: NAD  HEAD:  Atraumatic, Normocephalic  EYES: EOMI, PERRLA, conjunctiva and sclera clear  NECK: Supple, No JVD  CHEST/LUNG: Clear to auscultation bilaterally; No wheeze  HEART: Regular rate and rhythm; No murmurs, rubs, or gallops  ABDOMEN: Soft, Nontender, Nondistended; Bowel sounds present  EXTREMITIES:  2+ bipedal edema erythema improving   PSYCH: AAOx3  NEUROLOGY: non-focal  SKIN: No rashes or lesions    LABS:                        11.8   7.76  )-----------( 162      ( 24 Mar 2023 06:57 )             37.9     03-24    142  |  97  |  8   ----------------------------<  146<H>  3.3<L>   |  35<H>  |  0.59    Ca    10.0      24 Mar 2023 07:06  Mg     1.8     03-23                Culture - Stool (collected 22 Mar 2023 22:02)  Source: .Stool Feces  Preliminary Report (24 Mar 2023 07:49):    No enteric pathogens to date: Final culture pending        RADIOLOGY & ADDITIONAL TESTS:    Imaging Personally Reviewed:    Consultant(s) Notes Reviewed:      Care Discussed with Consultants/Other Providers:  
Patient is a 72y old  Female who presents with a chief complaint of leg swelling (22 Mar 2023 08:11)      SUBJECTIVE / OVERNIGHT EVENTS: No new complaints.   Review of Systems  chest pain no  palpitations no  sob no  nausea no  headache no    MEDICATIONS  (STANDING):  albuterol/ipratropium for Nebulization 3 milliLiter(s) Nebulizer every 6 hours  amLODIPine   Tablet 5 milliGRAM(s) Oral daily  aspirin enteric coated 81 milliGRAM(s) Oral daily  atorvastatin 40 milliGRAM(s) Oral at bedtime  busPIRone 10 milliGRAM(s) Oral <User Schedule>  ceFAZolin   IVPB      ceFAZolin   IVPB 2000 milliGRAM(s) IV Intermittent every 8 hours  chlorhexidine 2% Cloths 1 Application(s) Topical <User Schedule>  clobetasol 0.05% Ointment 1 Application(s) Topical two times a day  DULoxetine 60 milliGRAM(s) Oral daily  furosemide    Tablet 40 milliGRAM(s) Oral daily  gabapentin 100 milliGRAM(s) Oral two times a day  heparin   Injectable 5000 Unit(s) SubCutaneous every 8 hours  hydrALAZINE 75 milliGRAM(s) Oral two times a day  levothyroxine 125 MICROGram(s) Oral daily  losartan 100 milliGRAM(s) Oral daily  metoprolol tartrate 25 milliGRAM(s) Oral two times a day  montelukast 10 milliGRAM(s) Oral daily  OLANZapine 10 milliGRAM(s) Oral daily  pentoxifylline 400 milliGRAM(s) Oral daily  predniSONE   Tablet 5 milliGRAM(s) Oral daily  tiotropium 2.5 MICROgram(s) Inhaler 2 Puff(s) Inhalation daily    MEDICATIONS  (PRN):  acetaminophen     Tablet .. 650 milliGRAM(s) Oral every 6 hours PRN Temp greater or equal to 38.5C (101.3F), Mild Pain (1 - 3)  baclofen 5 milliGRAM(s) Oral every 8 hours PRN Muscle Spasm  melatonin 3 milliGRAM(s) Oral at bedtime PRN Insomnia  oxycodone    5 mG/acetaminophen 325 mG 1 Tablet(s) Oral every 6 hours PRN Moderate Pain (4 - 6)      Vital Signs Last 24 Hrs  T(C): 36.4 (22 Mar 2023 12:37), Max: 36.4 (21 Mar 2023 22:50)  T(F): 97.6 (22 Mar 2023 12:37), Max: 97.6 (22 Mar 2023 05:05)  HR: 81 (22 Mar 2023 12:37) (81 - 96)  BP: 131/81 (22 Mar 2023 12:37) (109/56 - 144/99)  BP(mean): --  RR: 18 (22 Mar 2023 12:37) (18 - 19)  SpO2: 97% (22 Mar 2023 12:37) (95% - 97%)    Parameters below as of 22 Mar 2023 12:37  Patient On (Oxygen Delivery Method): nasal cannula  O2 Flow (L/min): 2.5      PHYSICAL EXAM:  GENERAL: NAD, well-developed  HEAD:  Atraumatic, Normocephalic  EYES: EOMI, PERRLA, conjunctiva and sclera clear  NECK: Supple, No JVD  CHEST/LUNG: Clear to auscultation bilaterally; No wheeze  HEART: Regular rate and rhythm; No murmurs, rubs, or gallops  ABDOMEN: Soft, Nontender, Nondistended; Bowel sounds present  EXTREMITIES:  3+bipedal edema + erythema   PSYCH: AAOx3  NEUROLOGY: non-focal, tremor  SKIN: No rashes or lesions    LABS:                        11.7   11.60 )-----------( 172      ( 22 Mar 2023 09:55 )             38.0     03-22    141  |  99  |  15  ----------------------------<  134<H>  4.1   |  30  |  0.70    Ca    9.9      22 Mar 2023 07:03    TPro  6.4  /  Alb  3.6  /  TBili  0.6  /  DBili  x   /  AST  18  /  ALT  14  /  AlkPhos  103  03-21    PT/INR - ( 21 Mar 2023 17:38 )   PT: 13.5 sec;   INR: 1.16 ratio         PTT - ( 22 Mar 2023 09:55 )  PTT:>200.0 sec  CARDIAC MARKERS ( 21 Mar 2023 17:38 )  x     / x     / 126 U/L / x     / 1.8 ng/mL            RADIOLOGY & ADDITIONAL TESTS:    Imaging Personally Reviewed:    Consultant(s) Notes Reviewed:      Care Discussed with Consultants/Other Providers:  
CC: f/u for possible cellulitis    Patient reports: no acute complaints, Decreased LE edema and erythema    REVIEW OF SYSTEMS:  All other review of systems negative (Comprehensive ROS)    Antimicrobials Day #  :day 3/3  ceFAZolin   IVPB      ceFAZolin   IVPB 2000 milliGRAM(s) IV Intermittent every 8 hours    Other Medications Reviewed    T(F): 97.9 (03-24-23 @ 11:54), Max: 98.9 (03-23-23 @ 20:43)  HR: 78 (03-24-23 @ 11:54)  BP: 173/76 (03-24-23 @ 11:54)  RR: 18 (03-24-23 @ 11:54)  SpO2: 97% (03-24-23 @ 11:54)  Wt(kg): --    PHYSICAL EXAM:  General: alert, no acute distress  Eyes:  anicteric, no conjunctival injection, no discharge  Oropharynx: no lesions or injection 	  Neck: supple, without adenopathy  Lungs: clear to auscultation  Heart: regular rate and rhythm; no murmur, rubs or gallops  Abdomen: soft, nondistended, nontender, without mass or organomegaly  Skin: venous stasis changes and lipodermatosclerosis of legs  Extremities: no clubbing, cyanosis, or edema  Neurologic: alert, oriented, moves all extremities    LAB RESULTS:                        11.8   7.76  )-----------( 162      ( 24 Mar 2023 06:57 )             37.9     03-24    142  |  97  |  8   ----------------------------<  146<H>  3.3<L>   |  35<H>  |  0.59    Ca    10.0      24 Mar 2023 07:06  Mg     1.8     03-23          MICROBIOLOGY:  RECENT CULTURES:  03-22 @ 22:02 .Stool Feces     No enteric pathogens to date: Final culture pending      03-21 @ 17:20 .Blood Blood     No growth to date.      03-21 @ 17:15 .Blood Blood     No growth to date.          RADIOLOGY REVIEWED:

## 2023-03-24 NOTE — CHART NOTE - NSCHARTNOTEFT_GEN_A_CORE
Discharge Note:    Requested by Dr. Mora to facilitate d/c home.  V/s, labs, diagnostics reviewed, pt stable to d/c now.  Medication reconciliation reviewed, revised, and resolved with Dr. Mora who medically cleared with  f/u as directed.   Please refer to d/c note for hospital details.    Irene Schwartz, DEMARCO-c  [id number]

## 2023-03-24 NOTE — DISCHARGE NOTE NURSING/CASE MANAGEMENT/SOCIAL WORK - NSDCFUADDAPPT_GEN_ALL_CORE_FT
APPTS ARE READY TO BE MADE: [ X] YES    Best Family or Patient Contact (if needed):    Additional Information about above appointments (if needed):    1: Dr. Salcedo  2: Dr. Fletcher  3:     Other comments or requests:   Patient was provided with follow up request details for Denisse Gurrola, and the [facility] and was advised to call to schedule follow up within specified time frame.

## 2023-03-24 NOTE — DISCHARGE NOTE NURSING/CASE MANAGEMENT/SOCIAL WORK - NSDCPEFALRISK_GEN_ALL_CORE
For information on Fall & Injury Prevention, visit: [email or web address] OR  [email or web address] OR  https://www.cdc.gov/steadi/patient.html

## 2023-03-24 NOTE — ADVANCED PRACTICE NURSE CONSULT - REASON FOR CONSULT
Wound care consult initiated by RN to assess patient's skin for a possible bilateral buttocks deep tissue injury, present on admission      History of Present Illness:   72 year-old-female with history of COPD (not on home O2), hx of DVT not on AC, Graves disease, HLD, HTN, hypothyroidism, breast cancer in situ, parathyroidectomy, kidney cancer w/mets s/p L partial nephrectomy (not on chemo/RT), obesity, tremors, and chronic lymphedema now presenting to the ED via EMS for worsening b/l lower extremity swelling x 1 month. Patient with decreased ambulatory status during this time frame, uses walker at baseline to ambulate. Has home health aide daily 9.5 hours. Pt compliant with lasix medication. Pt brought to ED given worsening leg swelling and erythema. States was seen last time for similar presentation. Arrives on home O2 (2L). Deneis chest pain, sob, abdominal pain, nausea/vomiting, fever/chills. Did not take lasix medication today.

## 2023-03-24 NOTE — ADVANCED PRACTICE NURSE CONSULT - RECOMMEDATIONS
Impression:    urinary incontinence  fecal incontinence  incontinence dermatitis  B/L buttocks/sacral deep tissue injury, present on admission    Recommendations:    1) continue turning and positioning q2 and PRN utilizing offloading assistive devices  2) continue with routine pericare daily and PRN soiling  3) encourage optimal nutrition  4) waffle cushion when oob to chair  5) B/L LE complete cair air fluidized boots to offload heels/feet  6) daria protective barrier cream to B/L buttocks/sacrum/ischium daily and PRN soiling  7) incontinence management - continue external female urinary catheter to divert urine from skin    Plan discussed with ADDI Guevara on unit

## 2023-03-24 NOTE — PROGRESS NOTE ADULT - ASSESSMENT
72y female with a past history of COPD, DVT(no AC), Graves disease,HTN,HLD,partial left nephrectomy for renal cell cancer, parathyroidectomy, known metastatic renal cell cancer, who was admitted with B/L lower extremity edema and erythema.  She has been admitted in past for similar problem.  She is barely ambulatory. No fever or chills. She uses home O2 PRN.  This has been a chronic problem, has been getting worse .Cefazolin was started in ER.  LE dopplers negative.  Suspect combination of lymphedema, venous stasis, and perhaps lipodermatosclerosis as apposed to true cellulitis.   She is afebrile, has a normal wbc count, and has b/l leg involvement, worse on left.  No signs of active infection.All blood cultures have been negative  Suggest  1. Will stop cefazolin, likely non infectious process  2. dermatology input appreciated, topical rx as per there advice  3. Leg elevation, diuresis may help as well  4. Monitor off antibiotics

## 2023-03-24 NOTE — DISCHARGE NOTE NURSING/CASE MANAGEMENT/SOCIAL WORK - PATIENT PORTAL LINK FT
You can access the FollowMyHealth Patient Portal offered by [facility] by registering at the following website: [email or web address]. By joining [employer]’s FollowMyHealth portal, you will also be able to view your health information using other applications (apps) compatible with our system.

## 2023-03-24 NOTE — PROGRESS NOTE ADULT - PROVIDER SPECIALTY LIST ADULT
Cardiology
Cardiology
Infectious Disease
Internal Medicine
Internal Medicine
Neurology
Infectious Disease
Internal Medicine

## 2023-03-24 NOTE — PROGRESS NOTE ADULT - ASSESSMENT
72 f with    Cellulitis legs  - off antibiotic  - steroid cream   - ID follow    - Dermatology evaluation noted    A Flutter vs EKG with error  - Off Heparin  - telemetry with SR  - Echo noted  - Cardiology follow    Tremor  - Neurology evaluation noted    Anxiety / Depression   - control    Asthma   - nebs    Breast cancer in situ, left   - stable    COPD (chronic obstructive pulmonary disease)   - nebs    Graves disease   - follow TSH    Hyperlipidemia   - stable    Hypertension   - control    Hypothyroid   - continue supplementation    Kidney cancer, primary, with metastasis from kidney to other site, left LEft sided- s/p nephrectomy only- no chemo or RT  - stable    Obesity   - Nutrition education    DVT prophylaxis  - AC    DC home. Follow with PMD/ Cardiology in 3-4 days    d/w patient QA    Nathan Mora MD phone [phone]

## 2023-03-25 LAB
CULTURE RESULTS: SIGNIFICANT CHANGE UP
SPECIMEN SOURCE: SIGNIFICANT CHANGE UP

## 2023-04-04 PROCEDURE — 99443: CPT | Mod: 95

## 2023-04-06 NOTE — H&P ADULT. - NSSUBSTANCEUSE_GEN_ALL_CORE_SD
Bill For Surgical Tray: no Expected Date Of Service: 04/06/2023 Billing Type: Third-Party Bill Performing Laboratory: 0 never used

## 2023-04-19 NOTE — DIETITIAN INITIAL EVALUATION ADULT. - DOB: +DATEOFBIRTH
Patient : Osiel Barbosa Age: 30 year old Sex: male   MRN: [id number] Encounter Date: 4/19/2023      History     Chief Complaint   Patient presents with   • Rash     30-year-old male presents with his another for multiple complaints with the primary 1 being a facial rash that has been persistent for 1-2 months.  Patient reports some burning discomfort with the rash at times.  Patient reports chemical exposures at work.  Patient reports concerns with a chemical exposure can cause kidney problems and cancer.  Patient reports a 45 lb weight loss in last year that occurred after he stopped drinking alcohol but he changed his diet and no other way.  Patient states he was a heavy alcohol drinker previously.  Patient reports floaters in his eyes bilaterally for last couple of months that is particularly noticeable when are bright lights.  See list below.              ALLERGIES:  No Known Allergies    No current facility-administered medications on file prior to encounter.     Current Outpatient Medications on File Prior to Encounter   Medication Sig Dispense Refill   • [DISCONTINUED] methylPREDNISolone (MEDROL DOSEPAK) 4 MG tablet follow package directions 21 tablet 0   • [DISCONTINUED] naproxen (NAPROSYN) 500 MG tablet Take 1 tablet by mouth every 12 hours as needed for Pain. 20 tablet 0          Summary of your Discharge Medications      You have not been prescribed any medications.         Past Medical History:   Diagnosis Date   • Mononucleosis, infectious, with hepatitis 12/2020       Past Surgical History:   Procedure Laterality Date   • Appendectomy  07/23/2020       No family history on file.    Social History     Tobacco Use   • Smoking status: Former     Current packs/day: 0.00   • Smokeless tobacco: Never   Substance Use Topics   • Alcohol use: Not Currently   • Drug use: Not Currently       Review of Systems   Constitutional: Positive for unexpected weight change.   Eyes: Positive for visual disturbance.  Negative for photophobia.   Skin: Positive for rash.       Physical Exam     ED Triage Vitals [04/19/23 0801]   ED Triage Vitals Group      Temp 97.9 °F (36.6 °C)      Heart Rate 77      Resp 16      BP (!) 144/83      SpO2 100 %      EtCO2 mmHg       Height 5' 10\" (1.778 m)      Weight 165 lb (74.8 kg)      Weight Scale Used ED Stated      BMI (Calculated) 23.67      IBW/kg (Calculated) 73       Physical Exam  Vitals and nursing note reviewed.   Constitutional:       General: He is not in acute distress.     Appearance: He is well-developed and normal weight. He is ill-appearing. He is not toxic-appearing or diaphoretic.   HENT:      Head: Atraumatic. No abrasion or contusion.      Comments: Erythematous, slightly raised, facial rash mainly on the forehead but also on the cheeks.  See image below.     Right Ear: External ear normal.      Left Ear: External ear normal.      Nose: Nose normal. No congestion or rhinorrhea.      Mouth/Throat:      Mouth: Mucous membranes are moist.      Pharynx: Oropharynx is clear. No oropharyngeal exudate.      Comments: Patient reports recurrent tonsil stones.  There are no tonsilliths currently present.  No swelling or erythema.     Neck: Normal range of motion and neck supple.   Eyes:      Extraocular Movements: Extraocular movements intact.      Conjunctiva/sclera: Conjunctivae normal.      Pupils: Pupils are equal, round, and reactive to light.      Funduscopic exam:     Right eye: No hemorrhage or exudate.         Left eye: No hemorrhage or exudate.   Cardiovascular:      Rate and Rhythm: Normal rate and regular rhythm.      Heart sounds: Normal heart sounds.   Pulmonary:      Effort: Pulmonary effort is normal. No respiratory distress.      Breath sounds: Normal breath sounds.   Chest:      Chest wall: No tenderness.   Abdominal:      Palpations: Abdomen is soft. There is no mass.      Tenderness: There is no abdominal tenderness. There is no guarding or rebound.    Musculoskeletal:         General: No tenderness. Normal range of motion.   Lymphadenopathy:      Upper Body:      Left upper body: Supraclavicular adenopathy present.      Lower Body: Right inguinal adenopathy present.      Comments: Less than 1 cm isolated left super come follicular and right inguinal lymph node that patient states gets bigger at times.   Skin:     General: Skin is warm and dry.      Findings: Rash present.      Comments: No rash of the feet   Neurological:      General: No focal deficit present.      Mental Status: He is alert and oriented to person, place, and time.      Cranial Nerves: No cranial nerve deficit.   Psychiatric:         Mood and Affect: Mood is anxious.         Speech: Speech normal.         Behavior: Behavior normal.         Thought Content: Thought content normal.         Judgment: Judgment normal.               ED Course     Procedures    MDM    Orders placed:  Orders Placed This Encounter   • CBC with Automated Differential   • Comprehensive Metabolic Panel   • Magnesium   • Sedimentation Rate   • C Reactive Protein   • CBC with Automated Differential (performable only)   • Thyroid Stimulating Hormone Reflex   • potassium CHLORIDE (KLOR-CON M) kyle ER tablet 40 mEq       Medications/Fluids ordered/given:  Medications   potassium CHLORIDE (KLOR-CON M) kyle ER tablet 40 mEq (has no administration in time range)       Labs:   Results for orders placed or performed during the hospital encounter of 04/19/23   Comprehensive Metabolic Panel   Result Value    Fasting Status     Sodium 142    Potassium 3.3 (L)    Chloride 105    Carbon Dioxide 29    Anion Gap 11    Glucose 97    BUN 15    Creatinine 0.84    Glomerular Filtration Rate >90     Comment: eGFR results = or >60 mL/min/1.73m2 = Normal kidney function. Estimated GFR calculated using the CKD-EPI-R (2021) equation that does not include race in the creatinine calculation.    BUN/Cr 18    Calcium 9.3    Bilirubin, Total 0.7     GOT/AST 20    GPT/ALT 36    Alkaline Phosphatase 63    Albumin 4.1    Protein, Total 7.2    Globulin 3.1    A/G Ratio 1.3   Magnesium   Result Value    Magnesium 1.9   Sedimentation Rate   Result Value    RBC Sedimentation Rate 1   CBC with Automated Differential (performable only)   Result Value    WBC 6.9    RBC 5.12    HGB 15.4    HCT 42.1    MCV 82.2    MCH 30.1    MCHC 36.6 (H)    RDW-CV 12.1    RDW-SD 36.6 (L)        NRBC 0    Neutrophil, Percent 50    Lymphocytes, Percent 34    Mono, Percent 12    Eosinophils, Percent 4    Basophils, Percent 0    Immature Granulocytes 0    Absolute Neutrophils 3.4    Absolute Lymphocytes 2.3    Absolute Monocytes 0.9    Absolute Eosinophils  0.3    Absolute Basophils 0.0    Absolute Immature Granulocytes 0.0      Lab interpretation:  Labs unremarkable..  Emergency Physician interpretation.      Rechecks:   10:00 a.m.-10:07 AM  Discussed lab results.  Will place referral to primary care an optometry.  Will start on a steroid cream for the rash.  Discussed stress.  Patient reports that since he stopped drinking a year ago he has felt more anxious and with weight loss he is trying the with healthier.    Diagnosis:   1. Facial rash    2. Vitreous floaters of both eyes    3. Weight loss    4. Tonsillith    5. Hypokalemia    6. Thoracic back pain, unspecified back pain laterality, unspecified chronicity        Prescription(s):     Summary of your Discharge Medications      Take these Medications      Details   hydroCORTisone 2.5 % cream  Commonly known as: CORTIZONE   Apply 1 application. topically in the morning and 1 application. in the evening. To the rash               Follow-Up:   Jeovany Wick DO  [address] [facility]  [place] [id number] [phone]      primary care    Nanette Johnson MD  [address] [place] [id number] [phone]      eye doctor        Pt is discharged in stable condition.       Jeovany Fish MD  04/21/23 2007     Statement Selected

## 2023-05-02 PROCEDURE — 99443: CPT | Mod: 95

## 2023-05-08 ENCOUNTER — APPOINTMENT (OUTPATIENT)
Dept: GASTROENTEROLOGY | Facility: CLINIC | Age: 73
End: 2023-05-08

## 2023-05-15 PROCEDURE — 99443: CPT | Mod: 95

## 2023-06-12 PROCEDURE — 99443: CPT | Mod: 95

## 2023-06-20 NOTE — BEHAVIORAL HEALTH ASSESSMENT NOTE - NS ED BHA REVIEW OF ED CHART AVAILABLE LABS REVIEWED
· Recent echocardiogram results shows moderate regurgitation with calcified leaflet and annulus.  Patient has a history of rheumatic heart disease in the childhood.  · Currently patient is asymptomatic no chest pain shortness of breath or lower extremity swelling  · Patient encouraged to keep a close eye on her blood pressure and exercise regularly.  · Discussed red flag symptoms next repeat echocardiogram will be in 6 months to 1 year   Yes

## 2023-06-24 ENCOUNTER — INPATIENT (INPATIENT)
Facility: HOSPITAL | Age: 73
LOS: 11 days | Discharge: HOME HEALTH | DRG: 291 | End: 2023-07-06
Attending: INTERNAL MEDICINE | Admitting: INTERNAL MEDICINE
Payer: MEDICARE

## 2023-06-24 VITALS
RESPIRATION RATE: 22 BRPM | OXYGEN SATURATION: 96 % | SYSTOLIC BLOOD PRESSURE: 156 MMHG | TEMPERATURE: 99 F | DIASTOLIC BLOOD PRESSURE: 87 MMHG | HEART RATE: 66 BPM | HEIGHT: 60 IN

## 2023-06-24 DIAGNOSIS — Z98.89 OTHER SPECIFIED POSTPROCEDURAL STATES: Chronic | ICD-10-CM

## 2023-06-24 DIAGNOSIS — Z90.49 ACQUIRED ABSENCE OF OTHER SPECIFIED PARTS OF DIGESTIVE TRACT: Chronic | ICD-10-CM

## 2023-06-24 DIAGNOSIS — Z90.5 ACQUIRED ABSENCE OF KIDNEY: Chronic | ICD-10-CM

## 2023-06-24 DIAGNOSIS — R07.9 CHEST PAIN, UNSPECIFIED: ICD-10-CM

## 2023-06-24 LAB
ALBUMIN SERPL ELPH-MCNC: 3.7 G/DL — SIGNIFICANT CHANGE UP (ref 3.3–5)
ALP SERPL-CCNC: 86 U/L — SIGNIFICANT CHANGE UP (ref 40–120)
ALT FLD-CCNC: 13 U/L — SIGNIFICANT CHANGE UP (ref 10–45)
ANION GAP SERPL CALC-SCNC: 11 MMOL/L — SIGNIFICANT CHANGE UP (ref 5–17)
AST SERPL-CCNC: 12 U/L — SIGNIFICANT CHANGE UP (ref 10–40)
BASE EXCESS BLDV CALC-SCNC: 6.6 MMOL/L — HIGH (ref -2–3)
BASOPHILS # BLD AUTO: 0.04 K/UL — SIGNIFICANT CHANGE UP (ref 0–0.2)
BASOPHILS NFR BLD AUTO: 0.5 % — SIGNIFICANT CHANGE UP (ref 0–2)
BILIRUB SERPL-MCNC: 0.9 MG/DL — SIGNIFICANT CHANGE UP (ref 0.2–1.2)
BUN SERPL-MCNC: 9 MG/DL — SIGNIFICANT CHANGE UP (ref 7–23)
CA-I SERPL-SCNC: 1.29 MMOL/L — SIGNIFICANT CHANGE UP (ref 1.15–1.33)
CALCIUM SERPL-MCNC: 10.2 MG/DL — SIGNIFICANT CHANGE UP (ref 8.4–10.5)
CHLORIDE BLDV-SCNC: 103 MMOL/L — SIGNIFICANT CHANGE UP (ref 96–108)
CHLORIDE SERPL-SCNC: 102 MMOL/L — SIGNIFICANT CHANGE UP (ref 96–108)
CK MB BLD-MCNC: 3.8 % — HIGH (ref 0–3.5)
CK MB CFR SERPL CALC: 1.2 NG/ML — SIGNIFICANT CHANGE UP (ref 0–3.8)
CK MB CFR SERPL CALC: 1.3 NG/ML — SIGNIFICANT CHANGE UP (ref 0–3.8)
CK SERPL-CCNC: 32 U/L — SIGNIFICANT CHANGE UP (ref 25–170)
CK SERPL-CCNC: 37 U/L — SIGNIFICANT CHANGE UP (ref 25–170)
CO2 BLDV-SCNC: 34 MMOL/L — HIGH (ref 22–26)
CO2 SERPL-SCNC: 29 MMOL/L — SIGNIFICANT CHANGE UP (ref 22–31)
CREAT SERPL-MCNC: 0.65 MG/DL — SIGNIFICANT CHANGE UP (ref 0.5–1.3)
EGFR: 92 ML/MIN/1.73M2 — SIGNIFICANT CHANGE UP
EOSINOPHIL # BLD AUTO: 0.21 K/UL — SIGNIFICANT CHANGE UP (ref 0–0.5)
EOSINOPHIL NFR BLD AUTO: 2.5 % — SIGNIFICANT CHANGE UP (ref 0–6)
GAS PNL BLDV: 139 MMOL/L — SIGNIFICANT CHANGE UP (ref 136–145)
GAS PNL BLDV: SIGNIFICANT CHANGE UP
GAS PNL BLDV: SIGNIFICANT CHANGE UP
GLUCOSE BLDV-MCNC: 117 MG/DL — HIGH (ref 70–99)
GLUCOSE SERPL-MCNC: 124 MG/DL — HIGH (ref 70–99)
HCO3 BLDV-SCNC: 33 MMOL/L — HIGH (ref 22–29)
HCT VFR BLD CALC: 38.9 % — SIGNIFICANT CHANGE UP (ref 34.5–45)
HCT VFR BLDA CALC: 38 % — SIGNIFICANT CHANGE UP (ref 34.5–46.5)
HGB BLD CALC-MCNC: 12.5 G/DL — SIGNIFICANT CHANGE UP (ref 11.7–16.1)
HGB BLD-MCNC: 12.1 G/DL — SIGNIFICANT CHANGE UP (ref 11.5–15.5)
IMM GRANULOCYTES NFR BLD AUTO: 0.5 % — SIGNIFICANT CHANGE UP (ref 0–0.9)
LACTATE BLDV-MCNC: 1.7 MMOL/L — SIGNIFICANT CHANGE UP (ref 0.5–2)
LIDOCAIN IGE QN: 10 U/L — SIGNIFICANT CHANGE UP (ref 7–60)
LYMPHOCYTES # BLD AUTO: 1.34 K/UL — SIGNIFICANT CHANGE UP (ref 1–3.3)
LYMPHOCYTES # BLD AUTO: 16 % — SIGNIFICANT CHANGE UP (ref 13–44)
MCHC RBC-ENTMCNC: 30.8 PG — SIGNIFICANT CHANGE UP (ref 27–34)
MCHC RBC-ENTMCNC: 31.1 GM/DL — LOW (ref 32–36)
MCV RBC AUTO: 99 FL — SIGNIFICANT CHANGE UP (ref 80–100)
MONOCYTES # BLD AUTO: 0.77 K/UL — SIGNIFICANT CHANGE UP (ref 0–0.9)
MONOCYTES NFR BLD AUTO: 9.2 % — SIGNIFICANT CHANGE UP (ref 2–14)
NEUTROPHILS # BLD AUTO: 5.96 K/UL — SIGNIFICANT CHANGE UP (ref 1.8–7.4)
NEUTROPHILS NFR BLD AUTO: 71.3 % — SIGNIFICANT CHANGE UP (ref 43–77)
NRBC # BLD: 0 /100 WBCS — SIGNIFICANT CHANGE UP (ref 0–0)
NT-PROBNP SERPL-SCNC: 393 PG/ML — HIGH (ref 0–300)
PCO2 BLDV: 53 MMHG — HIGH (ref 39–42)
PH BLDV: 7.4 — SIGNIFICANT CHANGE UP (ref 7.32–7.43)
PLATELET # BLD AUTO: 147 K/UL — LOW (ref 150–400)
PO2 BLDV: 49 MMHG — HIGH (ref 25–45)
POTASSIUM BLDV-SCNC: 3.9 MMOL/L — SIGNIFICANT CHANGE UP (ref 3.5–5.1)
POTASSIUM SERPL-MCNC: 3.8 MMOL/L — SIGNIFICANT CHANGE UP (ref 3.5–5.3)
POTASSIUM SERPL-SCNC: 3.8 MMOL/L — SIGNIFICANT CHANGE UP (ref 3.5–5.3)
PROT SERPL-MCNC: 6.5 G/DL — SIGNIFICANT CHANGE UP (ref 6–8.3)
RAPID RVP RESULT: SIGNIFICANT CHANGE UP
RBC # BLD: 3.93 M/UL — SIGNIFICANT CHANGE UP (ref 3.8–5.2)
RBC # FLD: 13.5 % — SIGNIFICANT CHANGE UP (ref 10.3–14.5)
SAO2 % BLDV: 80.8 % — SIGNIFICANT CHANGE UP (ref 67–88)
SARS-COV-2 RNA SPEC QL NAA+PROBE: SIGNIFICANT CHANGE UP
SODIUM SERPL-SCNC: 142 MMOL/L — SIGNIFICANT CHANGE UP (ref 135–145)
TROPONIN T, HIGH SENSITIVITY RESULT: 24 NG/L — SIGNIFICANT CHANGE UP (ref 0–51)
TROPONIN T, HIGH SENSITIVITY RESULT: 27 NG/L — SIGNIFICANT CHANGE UP (ref 0–51)
TROPONIN T, HIGH SENSITIVITY RESULT: 30 NG/L — SIGNIFICANT CHANGE UP (ref 0–51)
WBC # BLD: 8.36 K/UL — SIGNIFICANT CHANGE UP (ref 3.8–10.5)
WBC # FLD AUTO: 8.36 K/UL — SIGNIFICANT CHANGE UP (ref 3.8–10.5)

## 2023-06-24 PROCEDURE — 71260 CT THORAX DX C+: CPT | Mod: 26,MA

## 2023-06-24 PROCEDURE — 93971 EXTREMITY STUDY: CPT | Mod: 26,LT

## 2023-06-24 PROCEDURE — 99285 EMERGENCY DEPT VISIT HI MDM: CPT | Mod: GC

## 2023-06-24 PROCEDURE — 71045 X-RAY EXAM CHEST 1 VIEW: CPT | Mod: 26

## 2023-06-24 PROCEDURE — 72070 X-RAY EXAM THORAC SPINE 2VWS: CPT | Mod: 26

## 2023-06-24 PROCEDURE — 72040 X-RAY EXAM NECK SPINE 2-3 VW: CPT | Mod: 26

## 2023-06-24 RX ORDER — PREGABALIN 225 MG/1
1 CAPSULE ORAL
Qty: 0 | Refills: 0 | DISCHARGE

## 2023-06-24 RX ORDER — PANTOPRAZOLE SODIUM 20 MG/1
40 TABLET, DELAYED RELEASE ORAL
Refills: 0 | Status: DISCONTINUED | OUTPATIENT
Start: 2023-06-24 | End: 2023-06-26

## 2023-06-24 RX ORDER — FUROSEMIDE 40 MG
40 TABLET ORAL DAILY
Refills: 0 | Status: DISCONTINUED | OUTPATIENT
Start: 2023-06-24 | End: 2023-06-27

## 2023-06-24 RX ORDER — MONTELUKAST 4 MG/1
10 TABLET, CHEWABLE ORAL DAILY
Refills: 0 | Status: DISCONTINUED | OUTPATIENT
Start: 2023-06-24 | End: 2023-07-06

## 2023-06-24 RX ORDER — ASPIRIN/CALCIUM CARB/MAGNESIUM 324 MG
81 TABLET ORAL DAILY
Refills: 0 | Status: DISCONTINUED | OUTPATIENT
Start: 2023-06-24 | End: 2023-06-30

## 2023-06-24 RX ORDER — LANOLIN ALCOHOL/MO/W.PET/CERES
3 CREAM (GRAM) TOPICAL AT BEDTIME
Refills: 0 | Status: DISCONTINUED | OUTPATIENT
Start: 2023-06-24 | End: 2023-07-06

## 2023-06-24 RX ORDER — HYDRALAZINE HCL 50 MG
75 TABLET ORAL
Refills: 0 | Status: DISCONTINUED | OUTPATIENT
Start: 2023-06-24 | End: 2023-07-06

## 2023-06-24 RX ORDER — DULOXETINE HYDROCHLORIDE 30 MG/1
60 CAPSULE, DELAYED RELEASE ORAL
Refills: 0 | Status: DISCONTINUED | OUTPATIENT
Start: 2023-06-24 | End: 2023-07-06

## 2023-06-24 RX ORDER — ACETAMINOPHEN 500 MG
650 TABLET ORAL EVERY 6 HOURS
Refills: 0 | Status: DISCONTINUED | OUTPATIENT
Start: 2023-06-24 | End: 2023-07-06

## 2023-06-24 RX ORDER — OLANZAPINE 15 MG/1
10 TABLET, FILM COATED ORAL DAILY
Refills: 0 | Status: DISCONTINUED | OUTPATIENT
Start: 2023-06-24 | End: 2023-07-06

## 2023-06-24 RX ORDER — ATORVASTATIN CALCIUM 80 MG/1
40 TABLET, FILM COATED ORAL AT BEDTIME
Refills: 0 | Status: DISCONTINUED | OUTPATIENT
Start: 2023-06-24 | End: 2023-07-06

## 2023-06-24 RX ORDER — TIOTROPIUM BROMIDE 18 UG/1
2 CAPSULE ORAL; RESPIRATORY (INHALATION) DAILY
Refills: 0 | Status: DISCONTINUED | OUTPATIENT
Start: 2023-06-24 | End: 2023-07-06

## 2023-06-24 RX ORDER — PENTOXIFYLLINE 400 MG
400 TABLET, EXTENDED RELEASE ORAL DAILY
Refills: 0 | Status: DISCONTINUED | OUTPATIENT
Start: 2023-06-24 | End: 2023-07-06

## 2023-06-24 RX ORDER — AMLODIPINE BESYLATE 2.5 MG/1
5 TABLET ORAL DAILY
Refills: 0 | Status: DISCONTINUED | OUTPATIENT
Start: 2023-06-24 | End: 2023-07-06

## 2023-06-24 RX ORDER — ENOXAPARIN SODIUM 100 MG/ML
40 INJECTION SUBCUTANEOUS EVERY 24 HOURS
Refills: 0 | Status: DISCONTINUED | OUTPATIENT
Start: 2023-06-24 | End: 2023-07-06

## 2023-06-24 RX ORDER — ALPRAZOLAM 0.25 MG
0.25 TABLET ORAL ONCE
Refills: 0 | Status: DISCONTINUED | OUTPATIENT
Start: 2023-06-24 | End: 2023-06-24

## 2023-06-24 RX ORDER — GABAPENTIN 400 MG/1
300 CAPSULE ORAL
Refills: 0 | Status: DISCONTINUED | OUTPATIENT
Start: 2023-06-24 | End: 2023-07-06

## 2023-06-24 RX ORDER — LOSARTAN POTASSIUM 100 MG/1
100 TABLET, FILM COATED ORAL DAILY
Refills: 0 | Status: DISCONTINUED | OUTPATIENT
Start: 2023-06-24 | End: 2023-07-06

## 2023-06-24 RX ORDER — LEVOTHYROXINE SODIUM 125 MCG
125 TABLET ORAL DAILY
Refills: 0 | Status: DISCONTINUED | OUTPATIENT
Start: 2023-06-24 | End: 2023-07-06

## 2023-06-24 RX ADMIN — ATORVASTATIN CALCIUM 40 MILLIGRAM(S): 80 TABLET, FILM COATED ORAL at 21:40

## 2023-06-24 RX ADMIN — GABAPENTIN 300 MILLIGRAM(S): 400 CAPSULE ORAL at 19:59

## 2023-06-24 RX ADMIN — Medication 3 MILLIGRAM(S): at 21:40

## 2023-06-24 RX ADMIN — Medication 40 MILLIGRAM(S): at 12:53

## 2023-06-24 RX ADMIN — Medication 0.25 MILLIGRAM(S): at 14:33

## 2023-06-24 RX ADMIN — Medication 75 MILLIGRAM(S): at 19:53

## 2023-06-24 RX ADMIN — ENOXAPARIN SODIUM 40 MILLIGRAM(S): 100 INJECTION SUBCUTANEOUS at 22:14

## 2023-06-24 RX ADMIN — Medication 10 MILLIGRAM(S): at 21:41

## 2023-06-24 NOTE — H&P ADULT - NSHPREVIEWOFSYSTEMS_GEN_ALL_CORE
REVIEW OF SYSTEMS:    CONSTITUTIONAL: No weakness, fevers or chills  EYES/ENT: No visual changes;  No vertigo or throat pain   NECK: No pain or stiffness  RESPIRATORY: No cough, wheezing, hemoptysis; + shortness of breath  CARDIOVASCULAR: No chest pain or palpitations  GASTROINTESTINAL: No abdominal or epigastric pain. No nausea, vomiting, or hematemesis; No diarrhea or constipation. No melena or hematochezia. + dysphagia.  GENITOURINARY: No dysuria, frequency or hematuria  NEUROLOGICAL: No numbness or weakness  SKIN: No itching, burning, rashes, or lesions   All other review of systems is negative unless indicated above.

## 2023-06-24 NOTE — H&P ADULT - ASSESSMENT
72 f with    Acute on Chronic Systolic Congestive Heart Failure  - telemetry  - diurese  - cardiac enzymes  - echo  - cardiology evaluation     Back pain  - pain control  - Xray C T spine    Dysphagia  - esophagogram   - full liquid diet  - SS eval  - Gastroenterology evaluation    Cellulitis legs vs Dermatitis  - observe off antibiotic  - steroid cream   - observe    Anxiety / Depression   - control    Asthma   - nebs    Breast cancer in situ, left   - stable. Follow as OTP with Oncology    COPD (chronic obstructive pulmonary disease)   - nebs    Graves disease   - follow TSH    Hyperlipidemia   - stable    Hypertension   - control    Hypothyroid   - continue supplementation    Kidney cancer, primary, with metastasis from kidney to other site, left LEft sided- s/p nephrectomy only- no chemo or RT  - stable    Obesity   - Nutrition education    DVT prophylaxis  - AC    Further action as per clinical course     Nathan Mora MD phone [phone]

## 2023-06-24 NOTE — CONSULT NOTE ADULT - SUBJECTIVE AND OBJECTIVE BOX
[facility]  Daniel Marcus PA-C  [address] [place] [id number] [phone]      Chief Complaint: Patient is a 72y old  Female who presents with a chief complaint of chest tightness    HPI:  72-year-old female history of COPD on 2 L nasal cannula at home, Graves' disease, HLD, HTN, breast cancer, kidney cancer, skin cancer all in remission presenting with a chief complaint of chest pain x2 months.  Patient states that pain radiates to her back and her mouth feels dry. Patient is also having difficulty swallowing. Patient states that she feels like she is having a choking sensation.  Patient describes symptoms as a tightness.  Patient states that she is coming in today because the symptoms are progressively getting worse.  Patient at baseline is not able to ambulate and does not know if movement makes symptoms worse.  Patient reports shortness of breath as well.  The patient's  is at bedside and reports that the patient also has had leg swelling.   reports that patient has had cellulitis in the past.  And he has noticed that there is increased redness of the leg.  Patient denies any recent illnesses, nausea, vomiting any headaches, visual changes, diarrhea, urinary symptoms. (2023 11:57)    GI consulted for dysphagia Patient reports she has trouble swallowing when she feels chest tightness, shoulder, back pain and she gets the choking sensation and can't swallow.  RN at bedside and patient is able to swallow water without any difficulty. Patient states currently she is not experiencing any trouble swallowing. She had EGG/ colonoscopy done 4 months ago by GI physician in [place]. She can not recall the name of the physician. Reports she was diagnosed with IBS and experiences diarrhea and takes imodium that provides relief.     Allergies    No Known Drug Allergies  Peaches (Hives)  shellfish (Hives)  Grapes (Hives)    Intolerances      MEDICATIONS  (STANDING):  ALPRAZolam 0.25 milliGRAM(s) Oral once  amLODIPine   Tablet 5 milliGRAM(s) Oral daily  aspirin enteric coated 81 milliGRAM(s) Oral daily  atorvastatin 40 milliGRAM(s) Oral at bedtime  busPIRone 10 milliGRAM(s) Oral two times a day  DULoxetine 60 milliGRAM(s) Oral two times a day  enoxaparin Injectable 40 milliGRAM(s) SubCutaneous every 24 hours  furosemide   Injectable 40 milliGRAM(s) IV Push daily  gabapentin 300 milliGRAM(s) Oral two times a day  hydrALAZINE 75 milliGRAM(s) Oral two times a day  levothyroxine 125 MICROGram(s) Oral daily  losartan 100 milliGRAM(s) Oral daily  montelukast 10 milliGRAM(s) Oral daily  OLANZapine 10 milliGRAM(s) Oral daily  pantoprazole    Tablet 40 milliGRAM(s) Oral before breakfast  pentoxifylline 400 milliGRAM(s) Oral daily  propranolol 20 milliGRAM(s) Oral two times a day  tiotropium 2.5 MICROgram(s) Inhaler 2 Puff(s) Inhalation daily    MEDICATIONS  (PRN):  acetaminophen     Tablet .. 650 milliGRAM(s) Oral every 6 hours PRN Temp greater or equal to 38.5C (101.3F), Mild Pain (1 - 3)  melatonin 3 milliGRAM(s) Oral at bedtime PRN Insomnia  oxycodone    5 mG/acetaminophen 325 mG 1 Tablet(s) Oral every 6 hours PRN Moderate Pain (4 - 6)        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      History of carpal tunnel release  right wrist      History of parathyroidectomy      S/P laminectomy  now bed and wheelchair ridden with severe pain    FAMILY HISTORY:  Family history of myocardial infarction  mother  at age 67 and father at age 67 of MI's    Family history of hypertension  mother and father    Family history of diabetes mellitus (Sibling)  siblings    Family history of heart disease (Sibling)  brother has a PPM    Family history of thyroid cancer (Child)  daughter has thyroid cancer      Social History:  Social History:    Marital Status:  (x   )    (   ) Single    (   )    (  )   Occupation:   Lives with: (  ) alone  (  ) children   ( x ) spouse   (  ) parents  (  ) other    Substance Use (street drugs): ( x ) never used  (  ) other:  Tobacco Usage:  (  x ) never smoked   (   ) former smoker   (   ) current smoker  (     ) pack years  (        ) last cigarette date  Alcohol Usage: no    (     ) Advanced Directives: (     ) None    (      ) DNR    (     ) DNI    (     ) Health Care Proxy: (2023 11:57)    ROS:     General:  No wt loss, fevers, chills, night sweats, fatigue,   Eyes:  Good vision, no reported pain  ENT:  No sore throat, pain, + throat dryness,  runny nose, +dysphagia  CV:  No pain, palpitations, hypo/hypertension  Resp:  No dyspnea, cough, tachypnea,   GI:  No pain, No nausea, No vomiting, +diarrhea IBS, No constipation, No weight loss, No fever, No pruritis, No rectal bleeding, No tarry stools  :  No pain, bleeding, incontinence, nocturia  Muscle:  no  pain,   Neuro:  No weakness, tingling, memory problems  Psych:  No fatigue, insomnia, mood problems, depression  Endocrine:  No polyuria, polydipsia, cold/heat intolerance  Heme:  No petechiae, ecchymosis, easy bruisability  Skin:  No rash, scars, edema    Physical Exam :  Vital Signs Last 24 Hrs  T(C): 36.9 (2023 07:28), Max: 37.1 (2023 06:04)  T(F): 98.5 (2023 07:28), Max: 98.8 (2023 06:04)  HR: 68 (:28) (66 - 75)  BP: 163/89 (2023 07:28) (150/82 - 163/89)  BP(mean): --  RR: 18 (2023 07:28) (18 - 22)  SpO2: 95% (2023 07:28) (95% - 97%)    Parameters below as of 2023 07:28  Patient On (Oxygen Delivery Method): nasal cannula  O2 Flow (L/min): 2    Daily Height in cm: 152.4 (2023 06:04)    Daily     GENERAL:  Appears stated age,   HEENT:  NC/AT,    CHEST:  Full & symmetric excursion,   HEART:  Regular rhythm  ABDOMEN:  Soft, non-tender, non-distended,   EXTEREMITIES:  no cyanosis,clubbing or edema  SKIN:  No rash  NEURO:  Alert,  LABS:                        12.1   8.36  )-----------( 147      ( 2023 06:52 )             38.9       142  |  102  |  9   ----------------------------<  124<H>  3.8   |  29  |  0.65    Ca    10.2      2023 06:52    TPro  6.5  /  Alb  3.7  /  TBili  0.9  /  DBili  x   /  AST  12  /  ALT  13  /  AlkPhos  86    LIVER FUNCTIONS - ( 2023 06:52 )  Alb: 3.7 g/dL / Pro: 6.5 g/dL / ALK PHOS: 86 U/L / ALT: 13 U/L / AST: 12 U/L / GGT: x         Urinalysis Basic - ( 2023 06:52 )    Color: x / Appearance: x / SG: x / pH: x  Gluc: 124 mg/dL / Ketone: x  / Bili: x / Urobili: x   Blood: x / Protein: x / Nitrite: x   Leuk Esterase: x / RBC: x / WBC x   Sq Epi: x / Non Sq Epi: x / Bacteria: x  Imaging:    
CHIEF COMPLAINT: cp    HISTORY OF PRESENT ILLNESS:  72-year-old female history of COPD on 2 L nasal cannula at home, Graves' disease, HLD, HTN, breast cancer, kidney cancer, skin cancer all in remission presenting with a chief complaint of chest pain x2 months.  Patient states that pain radiates to her back and her mouth feels dry. Patient is also having difficulty swallowing. Patient states that she feels like she is having a choking sensation.  Patient describes symptoms as a tightness.  Patient states that she is coming in today because the symptoms are progressively getting worse.  Patient at baseline is not able to ambulate and does not know if movement makes symptoms worse.  Patient reports shortness of breath as well.  The patient's  is at bedside and reports that the patient also has had leg swelling.   reports that patient has had cellulitis in the past.  And he has noticed that there is increased redness of the leg.  Patient denies any recent illnesses, nausea, vomiting any headaches, visual changes, diarrhea, urinary symptoms.        Allergies    No Known Drug Allergies  Peaches (Hives)  shellfish (Hives)  Grapes (Hives)    Intolerances    	    MEDICATIONS:  see med rec                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      History of carpal tunnel release  right wrist      History of parathyroidectomy      S/P laminectomy  now bed and wheelchair ridden with severe pain          FAMILY HISTORY:  Family history of myocardial infarction  mother  at age 67 and father at age 67 of MI's    Family history of hypertension  mother and father    Family history of diabetes mellitus (Sibling)  siblings    Family history of heart disease (Sibling)  brother has a PPM    Family history of thyroid cancer (Child)  daughter has thyroid cancer        SOCIAL HISTORY:    non smoker. wheelchair bound      REVIEW OF SYSTEMS:  See HPI, otherwise complete 10 point review of systems negative    [ ] All others negative	      PHYSICAL EXAM:  T(C): 36.9 (23 @ 07:28), Max: 37.1 (23 @ 06:04)  HR: 68 (23 @ 07:28) (66 - 75)  BP: 163/89 (23 @ 07:28) (150/82 - 163/89)  RR: 18 (23 @ 07:28) (18 - 22)  SpO2: 95% (23 @ 07:28) (95% - 97%)  Wt(kg): --  I&O's Summary      Appearance: No Acute Distress	  HEENT:  Normal oral mucosa, PERRL, EOMI	  Cardiovascular: Normal S1 S2, No JVD, No murmurs/rubs/gallops  Respiratory: Lungs clear to auscultation bilaterally  Gastrointestinal:  Soft, Non-tender, + BS	  Skin: No rashes, No ecchymoses, No cyanosis	  Neurologic: Non-focal  Extremities: No clubbing, cyanosis or edema  Vascular: Peripheral pulses palpable 2+ bilaterally  Psychiatry: A & O x 3, Mood & affect appropriate    Laboratory Data:	 	    CBC Full  -  ( 2023 06:52 )  WBC Count : 8.36 K/uL  Hemoglobin : 12.1 g/dL  Hematocrit : 38.9 %  Platelet Count - Automated : 147 K/uL  Mean Cell Volume : 99.0 fl  Mean Cell Hemoglobin : 30.8 pg  Mean Cell Hemoglobin Concentration : 31.1 gm/dL  Auto Neutrophil # : 5.96 K/uL  Auto Lymphocyte # : 1.34 K/uL  Auto Monocyte # : 0.77 K/uL  Auto Eosinophil # : 0.21 K/uL  Auto Basophil # : 0.04 K/uL  Auto Neutrophil % : 71.3 %  Auto Lymphocyte % : 16.0 %  Auto Monocyte % : 9.2 %  Auto Eosinophil % : 2.5 %  Auto Basophil % : 0.5 %        142  |  102  |  9   ----------------------------<  124<H>  3.8   |  29  |  0.65    Ca    10.2      2023 06:52    TPro  6.5  /  Alb  3.7  /  TBili  0.9  /  DBili  x   /  AST  12  /  ALT  13  /  AlkPhos  86        proBNP:   Lipid Profile:   HgA1c:   TSH:       CARDIAC MARKERS:            Interpretation of Telemetry: 	    ECG:  	  RADIOLOGY:  OTHER: 	    PREVIOUS DIAGNOSTIC TESTING:    [ ] Echocardiogram:  [ ] Catheterization:  [ ] Stress Test:  	    Assessment:  72-year-old female history of COPD on 2 L nasal cannula at home, Graves' disease, HLD, HTN, breast cancer, kidney cancer, skin cancer all in remission presenting with a chief complaint of chest pain x2 months.        Recs:  cardiac stable  no e/o acs by ecg or biomarkers  obtain cta chest to r/o dissection/PE  vol up --> start IV lasix 40mg qd  rec compression socks, ace wraps and leg elevation  le giovanni duplex to r/o dvt  admit to dr berumen      Advanced care planning forms were discussed. Code status including forceful chest compressions, defibrillation and intubation were discussed. The risks benefits and alternatives to pertinent cardiac procedures and interventions were discussed in detail and all questions were answered. Duration: 15 minutes.  Greater than 90 minutes spent on total encounter; more than 50% of the visit was spent counseling and/or coordinating care by the attending physician.   	  Juan Salcedo MD   Cardiovascular Diseases  [phone]

## 2023-06-24 NOTE — ED PROVIDER NOTE - NS_BEDUNITTYPES_ED_ALL_ED
Problem: Chronic Conditions and Co-morbidities  Goal: Patient's chronic conditions and co-morbidity symptoms are monitored and maintained or improved  Outcome: Progressing     Problem: Pain  Goal: Verbalizes/displays adequate comfort level or baseline comfort level  Outcome: Progressing TELEMETRY

## 2023-06-24 NOTE — ED PROVIDER NOTE - PROGRESS NOTE DETAILS
ERICKA Key (PGY-3) -patient's work-up without PE.  EKG and cardiac markers without ischemic picture.  She does appear fluid overloaded we will continue diuresis.  She was seen by Dr. Juan Salcedo who recommended continuing IV Lasix 40 mg daily.  Discussed case with Dr. de luna

## 2023-06-24 NOTE — ED PROVIDER NOTE - CLINICAL SUMMARY MEDICAL DECISION MAKING FREE TEXT BOX
Differential is not limited to ACS, CHF, COPD, PNA, with left lower extremity swelling and redness cannot rule out DVT, cellulitis, patient also endorsing difficulty swallowing due to dryness.  Cannot exclude autoimmune disease.  Will obtain basic labs, cardiac labs, chest x-ray, EKG, DVT study, chest CTA to rule out any masses with patient's cancer history.  Dispo pending labs, reassessment and imaging. Differential is not limited to ACS, CHF, COPD, PNA, with left lower extremity swelling and redness cannot rule out DVT, cellulitis, patient also endorsing difficulty swallowing due to dryness.  Cannot exclude autoimmune disease.  Will obtain basic labs, cardiac labs, chest x-ray, EKG, DVT study, chest CTA to rule out any masses with patient's cancer history.  Dispo pending labs, reassessment and imaging.    Radha Mcpherson, Attending Physician: agree with above. Sjogren's syndrome with ?CREST is also on the differential given possibility of esophageal dysmotility with persistent complaint of dry mouth. Will obtain labs, advanced imaging. No indication for antibiotics at this time.

## 2023-06-24 NOTE — CONSULT NOTE ADULT - ASSESSMENT
Dysphagia  IBS  Diarrhea    Speech and swallow eval. pending  Xray Esophagram pending  GI PCR if diarrhea develops   Aspiration precautions  will follow

## 2023-06-24 NOTE — H&P ADULT - NSHPSOCIALHISTORY_GEN_ALL_CORE
Social History:    Marital Status:  (x   )    (   ) Single    (   )    (  )   Occupation:   Lives with: (  ) alone  (  ) children   ( x ) spouse   (  ) parents  (  ) other    Substance Use (street drugs): ( x ) never used  (  ) other:  Tobacco Usage:  (  x ) never smoked   (   ) former smoker   (   ) current smoker  (     ) pack years  (        ) last cigarette date  Alcohol Usage: no    (     ) Advanced Directives: (     ) None    (      ) DNR    (     ) DNI    (     ) Health Care Proxy:

## 2023-06-24 NOTE — H&P ADULT - NSHPLABSRESULTS_GEN_ALL_CORE
12.1   8.36  )-----------( 147      ( 24 Jun 2023 06:52 )             38.9       06-24    142  |  102  |  9   ----------------------------<  124<H>  3.8   |  29  |  0.65    Ca    10.2      24 Jun 2023 06:52    TPro  6.5  /  Alb  3.7  /  TBili  0.9  /  DBili  x   /  AST  12  /  ALT  13  /  AlkPhos  86  06-24              Urinalysis Basic - ( 24 Jun 2023 06:52 )    Color: x / Appearance: x / SG: x / pH: x  Gluc: 124 mg/dL / Ketone: x  / Bili: x / Urobili: x   Blood: x / Protein: x / Nitrite: x   Leuk Esterase: x / RBC: x / WBC x   Sq Epi: x / Non Sq Epi: x / Bacteria: x      < from: VA Duplex Lower Ext Vein Scan, Left (06.24.23 @ 09:56) >      IMPRESSION:    No acute DVT of the left common femoral vein, left femoral vein, and left   popliteal vein.    Left calf veins are not visualized due to limitations of body habitus and   soft tissue edema. Correlate clinically.    < end of copied text >    < from: CT Chest w/ IV Cont (06.24.23 @ 09:02) >    IMPRESSION:  Emphysema.    Indeterminate 2.8 cm hypodense lesion in the spleen.    --- End of Report ---    < end of copied text >    < from: Xray Chest 1 View- PORTABLE-Urgent (06.24.23 @ 06:53) >    IMPRESSION:  Mild pulmonary venous hypertension/pulmonary edema with small bilateral   pleural effusions.    < end of copied text >    EKG SR NSST/T

## 2023-06-24 NOTE — ED PROVIDER NOTE - PHYSICAL EXAMINATION
GENERAL: Awake, alert, in moderate distress   HEENT: NC/AT, moist mucous membranes, PERRL, EOMI  LUNGS: CTAB, no wheezes or crackles   CARDIAC: RRR, no m/r/g  ABDOMEN: Soft, non tender, non distended, no rebound, no guarding  BACK: No midline spinal tenderness, no CVA tenderness  EXT: + edema, erythema of the left leg and increased warmth, +calf tenderness of the left leg, 2+ DP pulses bilaterally, no deformities.  NEURO: A&Ox3. Moving upper extremities   SKIN: Warm and dry. No rash.  PSYCH: Normal affect.

## 2023-06-24 NOTE — ED ADULT NURSE NOTE - NSSEPSISNEWALTERMENTAL_ED_A_ED
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      
Pt notified of results  She states she has not been following diet or exercising and declines medication adjustment.  Discussed elevated TG could be due to glucose as well. She declines a1C  Discussed elevated TG can cause pancreatitis and if she develops abdominal pain she needs to seek treatment. She voices understanding  
No

## 2023-06-24 NOTE — ED ADULT NURSE REASSESSMENT NOTE - NS ED NURSE REASSESS COMMENT FT1
Report received from ADDI Urena . Pt AAOx4, NAD, resp nonlabored, skin warm/dry, resting comfortably in bed with family at bedside. Pt presented to ED c/o chest pain . Pt denies headache, dizziness, palpitations, SOB, abd pain, n/v/d, urinary symptoms, fevers, chills, weakness at this time. Pt awaiting CT scan. Pt placed on CM and normal sinus noted. Safety maintained with call bell within reach.

## 2023-06-24 NOTE — ED PROVIDER NOTE - OBJECTIVE STATEMENT
72-year-old female history of COPD on 2 L nasal cannula at home, Graves' disease, HLD, HTN, breast cancer, kidney cancer, skin cancer all in remission presenting with a chief complaint of chest pain x2 months.  Patient states that pain radiates to her back and her mouth feels dry. Patient is also having difficulty swallowing. Patient states that she feels like she is having a choking sensation.  Patient describes symptoms as a tightness.  Patient states that she is coming in today because the symptoms are progressively getting worse.  Patient at baseline is not able to ambulate and does not know if movement makes symptoms worse.  Patient reports shortness of breath as well.  The patient's  is at bedside and reports that the patient also has had leg swelling.   reports that patient has had cellulitis in the past.  And he has noticed that there is increased redness of the leg.  Patient denies any recent illnesses, nausea, vomiting any headaches, visual changes, diarrhea, urinary symptoms.

## 2023-06-24 NOTE — H&P ADULT - NSHPPHYSICALEXAM_GEN_ALL_CORE
PHYSICAL EXAMINATION:  Vital Signs Last 24 Hrs  T(C): 36.9 (24 Jun 2023 07:28), Max: 37.1 (24 Jun 2023 06:04)  T(F): 98.5 (24 Jun 2023 07:28), Max: 98.8 (24 Jun 2023 06:04)  HR: 68 (24 Jun 2023 07:28) (66 - 75)  BP: 163/89 (24 Jun 2023 07:28) (150/82 - 163/89)  BP(mean): --  RR: 18 (24 Jun 2023 07:28) (18 - 22)  SpO2: 95% (24 Jun 2023 07:28) (95% - 97%)    Parameters below as of 24 Jun 2023 07:28  Patient On (Oxygen Delivery Method): nasal cannula  O2 Flow (L/min): 2    CAPILLARY BLOOD GLUCOSE          GENERAL: NAD  HEAD:  atraumatic, normocephalic  EYES: sclera anicteric  ENMT: mucous membranes moist  NECK: supple, No JVD  CHEST/LUNG: clear to auscultation bilaterally; no rales, rhonchi, or wheezing b/l  HEART: normal S1, S2  ABDOMEN: BS+, soft, ND, NT   EXTREMITIES:  2-3 + edema b/l LEs +. erythema   NEURO: awake, alert, interactive; moves all extremities  SKIN: no rashes or lesions

## 2023-06-24 NOTE — ED ADULT NURSE NOTE - OBJECTIVE STATEMENT
Pt is 72Y F, pmhx COPD on 2LNC, HLD, hypothyroidism, skin cancer, HTN, recent cellulitis, c/o chest pain, back pain, SOB intermittent for 2 months. Pt presents to ED on 2LNC baseline, pt endorses 6/10 chest pain, tremors, pt has +4 pitting edema of bilateral lower extremities, pt left leg red, warm, tender upon palpation, pt placed on cardiac monitor-NSR, pt denies any other symptoms, pt updated on plan of care, comfort and safety secured Pt is 72Y F, pmhx COPD on 2LNC, HLD, hypothyroidism, skin cancer, HTN, recent cellulitis, c/o chest pain, back pain, SOB intermittent for 2 months. Pt presents to ED on 2LNC baseline, pt endorses 6/10 chest pain, tremors, pt has +4 pitting edema of bilateral lower extremities, pt left leg red, warm, tender upon palpation, pt placed on cardiac monitor-NSR, pt denies any other symptoms, pt updated on plan of care, comfort and safety secured, pt A&Ox4, nonambulatory at baseline

## 2023-06-24 NOTE — ED ADULT NURSE REASSESSMENT NOTE - NS ED NURSE REASSESS COMMENT FT1
ACP contacted regarding pt diet status. Pt passed dysphagia test. ACP to place orders for updated diet for pt to take her 6pm meds.

## 2023-06-24 NOTE — CHART NOTE - NSCHARTNOTEFT_GEN_A_CORE
Pt c/o a lot of anxiety and asking for something to calm her down.  She says she has this at home but doesn't take any medication now but has taken Ativan in the past.  Pt denies using ETOH at home or pain meds on a routine basis.     Vital Signs Last 24 Hrs  T(C): 36.9 (24 Jun 2023 07:28), Max: 37.1 (24 Jun 2023 06:04)  T(F): 98.5 (24 Jun 2023 07:28), Max: 98.8 (24 Jun 2023 06:04)  HR: 68 (24 Jun 2023 07:28) (66 - 75)  BP: 163/89 (24 Jun 2023 07:28) (150/82 - 163/89)  BP(mean): --  RR: 18 (24 Jun 2023 07:28) (18 - 22)  SpO2: 95% (24 Jun 2023 07:28) (95% - 97%)    Parameters below as of 24 Jun 2023 07:28  Patient On (Oxygen Delivery Method): nasal cannula  O2 Flow (L/min): 2    Exam:  Pt is visibly shaking, Alert and answering questions appropriately.    A/P:  Pt does not admit to a history that would be concerning for etoh or narcotic withdrawal.   Will give Ativan .25mg x 1 and monitor for symptom relief

## 2023-06-25 LAB
ANION GAP SERPL CALC-SCNC: 19 MMOL/L — HIGH (ref 5–17)
BUN SERPL-MCNC: 10 MG/DL — SIGNIFICANT CHANGE UP (ref 7–23)
CALCIUM SERPL-MCNC: 9.7 MG/DL — SIGNIFICANT CHANGE UP (ref 8.4–10.5)
CHLORIDE SERPL-SCNC: 101 MMOL/L — SIGNIFICANT CHANGE UP (ref 96–108)
CO2 SERPL-SCNC: 21 MMOL/L — LOW (ref 22–31)
CREAT SERPL-MCNC: 0.66 MG/DL — SIGNIFICANT CHANGE UP (ref 0.5–1.3)
EGFR: 92 ML/MIN/1.73M2 — SIGNIFICANT CHANGE UP
GLUCOSE SERPL-MCNC: 80 MG/DL — SIGNIFICANT CHANGE UP (ref 70–99)
HCT VFR BLD CALC: 40 % — SIGNIFICANT CHANGE UP (ref 34.5–45)
HCT VFR BLD CALC: 42.4 % — SIGNIFICANT CHANGE UP (ref 34.5–45)
HGB BLD-MCNC: 12.9 G/DL — SIGNIFICANT CHANGE UP (ref 11.5–15.5)
HGB BLD-MCNC: 13.5 G/DL — SIGNIFICANT CHANGE UP (ref 11.5–15.5)
MCHC RBC-ENTMCNC: 31.3 PG — SIGNIFICANT CHANGE UP (ref 27–34)
MCHC RBC-ENTMCNC: 31.5 PG — SIGNIFICANT CHANGE UP (ref 27–34)
MCHC RBC-ENTMCNC: 31.8 GM/DL — LOW (ref 32–36)
MCHC RBC-ENTMCNC: 32.3 GM/DL — SIGNIFICANT CHANGE UP (ref 32–36)
MCV RBC AUTO: 97.6 FL — SIGNIFICANT CHANGE UP (ref 80–100)
MCV RBC AUTO: 98.4 FL — SIGNIFICANT CHANGE UP (ref 80–100)
NRBC # BLD: 0 /100 WBCS — SIGNIFICANT CHANGE UP (ref 0–0)
NRBC # BLD: 0 /100 WBCS — SIGNIFICANT CHANGE UP (ref 0–0)
PLATELET # BLD AUTO: 146 K/UL — LOW (ref 150–400)
PLATELET # BLD AUTO: SIGNIFICANT CHANGE UP (ref 150–400)
POTASSIUM SERPL-MCNC: 4 MMOL/L — SIGNIFICANT CHANGE UP (ref 3.5–5.3)
POTASSIUM SERPL-SCNC: 4 MMOL/L — SIGNIFICANT CHANGE UP (ref 3.5–5.3)
RBC # BLD: 4.1 M/UL — SIGNIFICANT CHANGE UP (ref 3.8–5.2)
RBC # BLD: 4.31 M/UL — SIGNIFICANT CHANGE UP (ref 3.8–5.2)
RBC # FLD: 13.6 % — SIGNIFICANT CHANGE UP (ref 10.3–14.5)
RBC # FLD: 13.8 % — SIGNIFICANT CHANGE UP (ref 10.3–14.5)
SODIUM SERPL-SCNC: 141 MMOL/L — SIGNIFICANT CHANGE UP (ref 135–145)
TSH SERPL-MCNC: 4.52 UIU/ML — HIGH (ref 0.27–4.2)
WBC # BLD: 10.42 K/UL — SIGNIFICANT CHANGE UP (ref 3.8–10.5)
WBC # BLD: 7.83 K/UL — SIGNIFICANT CHANGE UP (ref 3.8–10.5)
WBC # FLD AUTO: 10.42 K/UL — SIGNIFICANT CHANGE UP (ref 3.8–10.5)
WBC # FLD AUTO: 7.83 K/UL — SIGNIFICANT CHANGE UP (ref 3.8–10.5)

## 2023-06-25 PROCEDURE — 93306 TTE W/DOPPLER COMPLETE: CPT | Mod: 26

## 2023-06-25 RX ADMIN — TIOTROPIUM BROMIDE 2 PUFF(S): 18 CAPSULE ORAL; RESPIRATORY (INHALATION) at 13:50

## 2023-06-25 RX ADMIN — Medication 40 MILLIGRAM(S): at 05:38

## 2023-06-25 RX ADMIN — GABAPENTIN 300 MILLIGRAM(S): 400 CAPSULE ORAL at 05:39

## 2023-06-25 RX ADMIN — Medication 10 MILLIGRAM(S): at 18:33

## 2023-06-25 RX ADMIN — MONTELUKAST 10 MILLIGRAM(S): 4 TABLET, CHEWABLE ORAL at 13:49

## 2023-06-25 RX ADMIN — ATORVASTATIN CALCIUM 40 MILLIGRAM(S): 80 TABLET, FILM COATED ORAL at 21:54

## 2023-06-25 RX ADMIN — Medication 75 MILLIGRAM(S): at 05:40

## 2023-06-25 RX ADMIN — DULOXETINE HYDROCHLORIDE 60 MILLIGRAM(S): 30 CAPSULE, DELAYED RELEASE ORAL at 18:32

## 2023-06-25 RX ADMIN — AMLODIPINE BESYLATE 5 MILLIGRAM(S): 2.5 TABLET ORAL at 05:39

## 2023-06-25 RX ADMIN — GABAPENTIN 300 MILLIGRAM(S): 400 CAPSULE ORAL at 18:33

## 2023-06-25 RX ADMIN — Medication 125 MICROGRAM(S): at 05:40

## 2023-06-25 RX ADMIN — ENOXAPARIN SODIUM 40 MILLIGRAM(S): 100 INJECTION SUBCUTANEOUS at 21:54

## 2023-06-25 RX ADMIN — Medication 75 MILLIGRAM(S): at 18:33

## 2023-06-25 RX ADMIN — PANTOPRAZOLE SODIUM 40 MILLIGRAM(S): 20 TABLET, DELAYED RELEASE ORAL at 05:39

## 2023-06-25 RX ADMIN — DULOXETINE HYDROCHLORIDE 60 MILLIGRAM(S): 30 CAPSULE, DELAYED RELEASE ORAL at 05:40

## 2023-06-25 RX ADMIN — Medication 10 MILLIGRAM(S): at 05:40

## 2023-06-25 RX ADMIN — OLANZAPINE 10 MILLIGRAM(S): 15 TABLET, FILM COATED ORAL at 13:49

## 2023-06-25 RX ADMIN — LOSARTAN POTASSIUM 100 MILLIGRAM(S): 100 TABLET, FILM COATED ORAL at 05:40

## 2023-06-25 RX ADMIN — Medication 400 MILLIGRAM(S): at 13:49

## 2023-06-25 RX ADMIN — Medication 81 MILLIGRAM(S): at 13:48

## 2023-06-25 NOTE — PATIENT PROFILE ADULT - FALL HARM RISK - HARM RISK INTERVENTIONS

## 2023-06-25 NOTE — PHYSICAL THERAPY INITIAL EVALUATION ADULT - ADDITIONAL COMMENTS
Pt resides in a pvt home w/  & son, no steps to negotiate. Pt states she requires assistance w/ all mobility & ADL's. States she is able to ambulate short distances w/ RW & assistance, but mainly utilizes WC for mobility. Has [facility] 8am - 5:30pm, 7 days/week.

## 2023-06-25 NOTE — PATIENT PROFILE ADULT - PATIENT REPRESENTATIVE: ( YOU CAN CHOOSE ANY PERSON THAT CAN ASSIST YOU WITH YOUR HEALTH CARE PREFERENCES, DOES NOT HAVE TO BE A SPOUSE, IMMEDIATE FAMILY OR SIGNIFICANT OTHER/PARTNER)
same name as above
Patient presents a high risk to self, with risk factors including poor insight, poor judgement, substance use, paranoia, ideas of reference, paranoia, poor sleep hygiene, poor reality testing, suicidal statements, hopelessness, unable to care for self, of which protective factors of supportive family, no current suicidal ideation are not sufficient barriers to patient's self harm, summating patient as a significant risk for harm requiring in-patient hospitalization for safety and stabilization.

## 2023-06-25 NOTE — PHYSICAL THERAPY INITIAL EVALUATION ADULT - NSPTDISCHREC_GEN_A_CORE
TBD pending completion of functional mobility assessment. Anticipate Home w/ no skilled PT needs as pt appears at baseline.

## 2023-06-25 NOTE — PROGRESS NOTE ADULT - ASSESSMENT
72 f with    Acute on Chronic Systolic Congestive Heart Failure  - telemetry  - diurese  - echo  - cardiology follow    Back pain  - pain control  - Xray C T spine    Dysphagia  - esophagogram pending   - full liquid diet  - SS eval   - Gastroenterology evaluation noted    Cellulitis legs vs Dermatitis  - observe off antibiotic  - steroid cream   - observe    Anxiety / Depression   - control    Asthma   - nebs    Breast cancer in situ, left   - stable. Follow as OTP with Oncology    COPD (chronic obstructive pulmonary disease)   - nebs    Graves disease   - follow TSH    Hyperlipidemia   - stable    Hypertension   - control    Hypothyroid   - continue supplementation    Kidney cancer, primary, with metastasis from kidney to other site, left LEft sided- s/p nephrectomy only- no chemo or RT  - stable    Obesity   - Nutrition education    DVT prophylaxis  - AC     Nathan Mora MD phone [phone]

## 2023-06-25 NOTE — PATIENT PROFILE ADULT - VISION (WITH CORRECTIVE LENSES IF THE PATIENT USUALLY WEARS THEM):
Detail Level: Detailed Normal vision: sees adequately in most situations; can see medication labels, newsprint Detail Level: Generalized Detail Level: Zone

## 2023-06-26 LAB
ANION GAP SERPL CALC-SCNC: 12 MMOL/L — SIGNIFICANT CHANGE UP (ref 5–17)
BUN SERPL-MCNC: 8 MG/DL — SIGNIFICANT CHANGE UP (ref 7–23)
CALCIUM SERPL-MCNC: 10.2 MG/DL — SIGNIFICANT CHANGE UP (ref 8.4–10.5)
CHLORIDE SERPL-SCNC: 98 MMOL/L — SIGNIFICANT CHANGE UP (ref 96–108)
CO2 SERPL-SCNC: 34 MMOL/L — HIGH (ref 22–31)
CREAT SERPL-MCNC: 0.68 MG/DL — SIGNIFICANT CHANGE UP (ref 0.5–1.3)
D DIMER BLD IA.RAPID-MCNC: 263 NG/ML DDU — HIGH
EGFR: 91 ML/MIN/1.73M2 — SIGNIFICANT CHANGE UP
GI PCR PANEL: SIGNIFICANT CHANGE UP
GLUCOSE SERPL-MCNC: 139 MG/DL — HIGH (ref 70–99)
POTASSIUM SERPL-MCNC: 3.2 MMOL/L — LOW (ref 3.5–5.3)
POTASSIUM SERPL-SCNC: 3.2 MMOL/L — LOW (ref 3.5–5.3)
SODIUM SERPL-SCNC: 144 MMOL/L — SIGNIFICANT CHANGE UP (ref 135–145)

## 2023-06-26 PROCEDURE — 74220 X-RAY XM ESOPHAGUS 1CNTRST: CPT | Mod: 26

## 2023-06-26 RX ORDER — PANTOPRAZOLE SODIUM 20 MG/1
40 TABLET, DELAYED RELEASE ORAL
Refills: 0 | Status: DISCONTINUED | OUTPATIENT
Start: 2023-06-26 | End: 2023-07-06

## 2023-06-26 RX ORDER — ALPRAZOLAM 0.25 MG
0.25 TABLET ORAL ONCE
Refills: 0 | Status: DISCONTINUED | OUTPATIENT
Start: 2023-06-26 | End: 2023-06-26

## 2023-06-26 RX ORDER — POTASSIUM CHLORIDE 20 MEQ
40 PACKET (EA) ORAL ONCE
Refills: 0 | Status: DISCONTINUED | OUTPATIENT
Start: 2023-06-26 | End: 2023-06-26

## 2023-06-26 RX ORDER — POTASSIUM CHLORIDE 20 MEQ
40 PACKET (EA) ORAL ONCE
Refills: 0 | Status: COMPLETED | OUTPATIENT
Start: 2023-06-26 | End: 2023-06-26

## 2023-06-26 RX ADMIN — ENOXAPARIN SODIUM 40 MILLIGRAM(S): 100 INJECTION SUBCUTANEOUS at 21:47

## 2023-06-26 RX ADMIN — Medication 75 MILLIGRAM(S): at 06:03

## 2023-06-26 RX ADMIN — Medication 10 MILLIGRAM(S): at 06:03

## 2023-06-26 RX ADMIN — Medication 3 MILLIGRAM(S): at 21:47

## 2023-06-26 RX ADMIN — Medication 40 MILLIGRAM(S): at 06:04

## 2023-06-26 RX ADMIN — DULOXETINE HYDROCHLORIDE 60 MILLIGRAM(S): 30 CAPSULE, DELAYED RELEASE ORAL at 06:04

## 2023-06-26 RX ADMIN — Medication 10 MILLIGRAM(S): at 17:20

## 2023-06-26 RX ADMIN — Medication 125 MICROGRAM(S): at 06:02

## 2023-06-26 RX ADMIN — Medication 0.25 MILLIGRAM(S): at 10:48

## 2023-06-26 RX ADMIN — GABAPENTIN 300 MILLIGRAM(S): 400 CAPSULE ORAL at 06:04

## 2023-06-26 RX ADMIN — PANTOPRAZOLE SODIUM 40 MILLIGRAM(S): 20 TABLET, DELAYED RELEASE ORAL at 17:24

## 2023-06-26 RX ADMIN — DULOXETINE HYDROCHLORIDE 60 MILLIGRAM(S): 30 CAPSULE, DELAYED RELEASE ORAL at 17:32

## 2023-06-26 RX ADMIN — ATORVASTATIN CALCIUM 40 MILLIGRAM(S): 80 TABLET, FILM COATED ORAL at 21:47

## 2023-06-26 RX ADMIN — Medication 400 MILLIGRAM(S): at 09:50

## 2023-06-26 RX ADMIN — Medication 75 MILLIGRAM(S): at 17:20

## 2023-06-26 RX ADMIN — OLANZAPINE 10 MILLIGRAM(S): 15 TABLET, FILM COATED ORAL at 09:50

## 2023-06-26 RX ADMIN — GABAPENTIN 300 MILLIGRAM(S): 400 CAPSULE ORAL at 17:20

## 2023-06-26 RX ADMIN — PANTOPRAZOLE SODIUM 40 MILLIGRAM(S): 20 TABLET, DELAYED RELEASE ORAL at 06:03

## 2023-06-26 RX ADMIN — Medication 81 MILLIGRAM(S): at 09:50

## 2023-06-26 RX ADMIN — MONTELUKAST 10 MILLIGRAM(S): 4 TABLET, CHEWABLE ORAL at 09:50

## 2023-06-26 RX ADMIN — TIOTROPIUM BROMIDE 2 PUFF(S): 18 CAPSULE ORAL; RESPIRATORY (INHALATION) at 09:55

## 2023-06-26 RX ADMIN — LOSARTAN POTASSIUM 100 MILLIGRAM(S): 100 TABLET, FILM COATED ORAL at 06:02

## 2023-06-26 RX ADMIN — AMLODIPINE BESYLATE 5 MILLIGRAM(S): 2.5 TABLET ORAL at 06:02

## 2023-06-26 RX ADMIN — Medication 40 MILLIEQUIVALENT(S): at 08:40

## 2023-06-26 RX ADMIN — Medication 0.25 MILLIGRAM(S): at 21:47

## 2023-06-26 NOTE — PROGRESS NOTE ADULT - ASSESSMENT
72 f with    Acute on Chronic Systolic Congestive Heart Failure  - telemetry  - diurese  - echo  - cardiology follow    Back pain  - pain control  - Xray C T spine    Dysphagia  - esophagogram pending   - full liquid diet  - SS eval   - Gastroenterology evaluation noted    Cellulitis legs vs Dermatitis  - observe off antibiotic  - steroid cream   - observe    Anxiety / Depression   - control    Asthma   - nebs    Breast cancer in situ, left   - stable. Follow as OTP with Oncology    COPD (chronic obstructive pulmonary disease)   - nebs    Graves disease   - follow TSH    Hyperlipidemia   - stable    Hypertension   - control    Hypothyroid   - continue supplementation    Kidney cancer, primary, with metastasis from kidney to other site, left LEft sided- s/p nephrectomy only- no chemo or RT  - stable    Obesity   - Nutrition education    DVT prophylaxis  - AC     Nathan Mora MD phone [phone]

## 2023-06-26 NOTE — SWALLOW BEDSIDE ASSESSMENT ADULT - SWALLOW EVAL: DIAGNOSIS
72-year-old female history of COPD on 2 L nasal cannula at home, Graves' disease, HLD, HTN, breast cancer, kidney cancer, skin cancer all in remission presenting with a chief complaint of chest pain x2 months and difficulty swallowing.  Bedside evaluation attempted but not completed at this time as patient is NPO pending Esophagram. This service will continue to follow patient in house. Bedside swallow evaluation to be completed at a later time/date pending Esophagram results. Discussed with ADDI Gerardo and LOKI Bush.

## 2023-06-26 NOTE — PROGRESS NOTE ADULT - ASSESSMENT
Dysphagia  IBS  Diarrhea    Speech and swallow eval. pending  Xray Esophagram pending; scheduled for today   PPI BID  GI PCR negative  aspiration precautions  will follow   d/w pt     I reviewed the overnight course of events on the unit, re-confirming the patient history. I discussed the care with the patient and their family  The plan of care was discussed with the physician assistant and modifications were made to the notation where appropriate.   Differential diagnosis and plan of care discussed with patient after the evaluation  35 minutes spent on total encounter of which more than fifty percent of the encounter was spent counseling and/or coordinating care by the attending physician.  Advanced care planning was discussed with patient and family.  Advanced care planning forms were reviewed and discussed.  Risks, benefits and alternatives of gastroenterologic procedures were discussed in detail and all questions were answered.

## 2023-06-26 NOTE — SWALLOW BEDSIDE ASSESSMENT ADULT - COMMENTS
6/24 Chart note: Patient c/o of anxiety  GI consulted for dysphagia (6/24). Patient reports she has trouble swallowing when she feels chest tightness, shoulder, back pain and she gets the choking sensation and can't swallow.  RN at bedside and patient can swallow water without any difficulty. Patient states currently she is not experiencing any trouble swallowing.  Chest Xray (6/24) IMPRESSION: Mild pulmonary venous hypertension/pulmonary edema with small bilateral pleural effusions.  CT Chest (6/24) IMPRESSION: Emphysema. Indeterminate 2.8 cm hypodense lesion in the spleen.    TTE (6/25) CONCLUSIONS:  1. Normal left ventricular cavity size. The left ventricular wall thickness is normal. The left ventricular systolic function is normal with an ejection fraction of 71 % by Nagel's method of disks. There are no regional wall motion abnormalities seen.  2. There is mild (grade 1) left ventricular diastolic dysfunction.  3. Normal atria.  4. Normal right ventricular cavity size, normal right ventricular wall thickness and normal right ventricular systolic function.  5. No significant valvular disease  6. Compared to the transthoracic echocardiogram performed on 3/22/2023 there have been no significant interval changes.  Cardiology consulted (6/26) cardiac stable.   GI consulted (6/26): Patient scheduled for Xray Esophagram; results pending  Patient it not known to this service. 6/24 Chart note: Patient c/o of anxiety  6/24 ED ADULT: ACP contacted regarding pt diet status. Pt passed dysphagia test. ACP to place orders for updated diet for pt to take her 6pm meds  GI consulted for dysphagia (6/24). Patient reports she has trouble swallowing when she feels chest tightness, shoulder, back pain and she gets the choking sensation and can't swallow.  RN at bedside and patient can swallow water without any difficulty. Patient states currently she is not experiencing any trouble swallowing.  Chest Xray (6/24) IMPRESSION: Mild pulmonary venous hypertension/pulmonary edema with small bilateral pleural effusions.  CT Chest (6/24) IMPRESSION: Emphysema. Indeterminate 2.8 cm hypodense lesion in the spleen.    TTE (6/25) CONCLUSIONS:  1. Normal left ventricular cavity size. The left ventricular wall thickness is normal. The left ventricular systolic function is normal with an ejection fraction of 71 % by Nagel's method of disks. There are no regional wall motion abnormalities seen.  2. There is mild (grade 1) left ventricular diastolic dysfunction.  3. Normal atria.  4. Normal right ventricular cavity size, normal right ventricular wall thickness and normal right ventricular systolic function.  5. No significant valvular disease  6. Compared to the transthoracic echocardiogram performed on 3/22/2023 there have been no significant interval changes.  Cardiology consulted (6/26) cardiac stable.   GI consulted (6/26): Patient scheduled for Xray Esophagram; results pending    Patient it not known to this service.

## 2023-06-27 LAB
ANION GAP SERPL CALC-SCNC: 15 MMOL/L — SIGNIFICANT CHANGE UP (ref 5–17)
ANION GAP SERPL CALC-SCNC: 17 MMOL/L — SIGNIFICANT CHANGE UP (ref 5–17)
BASE EXCESS BLDV CALC-SCNC: 10.1 MMOL/L — HIGH (ref -2–3)
BUN SERPL-MCNC: 12 MG/DL — SIGNIFICANT CHANGE UP (ref 7–23)
BUN SERPL-MCNC: 12 MG/DL — SIGNIFICANT CHANGE UP (ref 7–23)
CA-I SERPL-SCNC: 1.24 MMOL/L — SIGNIFICANT CHANGE UP (ref 1.15–1.33)
CALCIUM SERPL-MCNC: 10 MG/DL — SIGNIFICANT CHANGE UP (ref 8.4–10.5)
CALCIUM SERPL-MCNC: 10.1 MG/DL — SIGNIFICANT CHANGE UP (ref 8.4–10.5)
CHLORIDE BLDV-SCNC: 99 MMOL/L — SIGNIFICANT CHANGE UP (ref 96–108)
CHLORIDE SERPL-SCNC: 98 MMOL/L — SIGNIFICANT CHANGE UP (ref 96–108)
CHLORIDE SERPL-SCNC: 98 MMOL/L — SIGNIFICANT CHANGE UP (ref 96–108)
CK MB CFR SERPL CALC: 1.5 NG/ML — SIGNIFICANT CHANGE UP (ref 0–3.8)
CK SERPL-CCNC: 37 U/L — SIGNIFICANT CHANGE UP (ref 25–170)
CO2 BLDV-SCNC: 38 MMOL/L — HIGH (ref 22–26)
CO2 SERPL-SCNC: 25 MMOL/L — SIGNIFICANT CHANGE UP (ref 22–31)
CO2 SERPL-SCNC: 25 MMOL/L — SIGNIFICANT CHANGE UP (ref 22–31)
CREAT SERPL-MCNC: 0.84 MG/DL — SIGNIFICANT CHANGE UP (ref 0.5–1.3)
CREAT SERPL-MCNC: 0.94 MG/DL — SIGNIFICANT CHANGE UP (ref 0.5–1.3)
EGFR: 64 ML/MIN/1.73M2 — SIGNIFICANT CHANGE UP
EGFR: 73 ML/MIN/1.73M2 — SIGNIFICANT CHANGE UP
GAS PNL BLDV: 135 MMOL/L — LOW (ref 136–145)
GAS PNL BLDV: SIGNIFICANT CHANGE UP
GAS PNL BLDV: SIGNIFICANT CHANGE UP
GLUCOSE BLDC GLUCOMTR-MCNC: 123 MG/DL — HIGH (ref 70–99)
GLUCOSE BLDV-MCNC: 123 MG/DL — HIGH (ref 70–99)
GLUCOSE SERPL-MCNC: 121 MG/DL — HIGH (ref 70–99)
GLUCOSE SERPL-MCNC: 129 MG/DL — HIGH (ref 70–99)
HCO3 BLDV-SCNC: 36 MMOL/L — HIGH (ref 22–29)
HCT VFR BLD CALC: 42.4 % — SIGNIFICANT CHANGE UP (ref 34.5–45)
HCT VFR BLDA CALC: 41 % — SIGNIFICANT CHANGE UP (ref 34.5–46.5)
HGB BLD CALC-MCNC: 13.8 G/DL — SIGNIFICANT CHANGE UP (ref 11.7–16.1)
HGB BLD-MCNC: 13.9 G/DL — SIGNIFICANT CHANGE UP (ref 11.5–15.5)
LACTATE BLDV-MCNC: 1.7 MMOL/L — SIGNIFICANT CHANGE UP (ref 0.5–2)
MAGNESIUM SERPL-MCNC: 1.8 MG/DL — SIGNIFICANT CHANGE UP (ref 1.6–2.6)
MAGNESIUM SERPL-MCNC: 1.8 MG/DL — SIGNIFICANT CHANGE UP (ref 1.6–2.6)
MCHC RBC-ENTMCNC: 31.8 PG — SIGNIFICANT CHANGE UP (ref 27–34)
MCHC RBC-ENTMCNC: 32.8 GM/DL — SIGNIFICANT CHANGE UP (ref 32–36)
MCV RBC AUTO: 97 FL — SIGNIFICANT CHANGE UP (ref 80–100)
NRBC # BLD: 0 /100 WBCS — SIGNIFICANT CHANGE UP (ref 0–0)
PCO2 BLDV: 55 MMHG — HIGH (ref 39–42)
PH BLDV: 7.43 — SIGNIFICANT CHANGE UP (ref 7.32–7.43)
PHOSPHATE SERPL-MCNC: 3.6 MG/DL — SIGNIFICANT CHANGE UP (ref 2.5–4.5)
PLATELET # BLD AUTO: 164 K/UL — SIGNIFICANT CHANGE UP (ref 150–400)
PO2 BLDV: 74 MMHG — HIGH (ref 25–45)
POTASSIUM BLDV-SCNC: 3.9 MMOL/L — SIGNIFICANT CHANGE UP (ref 3.5–5.1)
POTASSIUM SERPL-MCNC: 3.9 MMOL/L — SIGNIFICANT CHANGE UP (ref 3.5–5.3)
POTASSIUM SERPL-MCNC: 4 MMOL/L — SIGNIFICANT CHANGE UP (ref 3.5–5.3)
POTASSIUM SERPL-SCNC: 3.9 MMOL/L — SIGNIFICANT CHANGE UP (ref 3.5–5.3)
POTASSIUM SERPL-SCNC: 4 MMOL/L — SIGNIFICANT CHANGE UP (ref 3.5–5.3)
RBC # BLD: 4.37 M/UL — SIGNIFICANT CHANGE UP (ref 3.8–5.2)
RBC # FLD: 13.9 % — SIGNIFICANT CHANGE UP (ref 10.3–14.5)
SAO2 % BLDV: 95.7 % — HIGH (ref 67–88)
SODIUM SERPL-SCNC: 138 MMOL/L — SIGNIFICANT CHANGE UP (ref 135–145)
SODIUM SERPL-SCNC: 140 MMOL/L — SIGNIFICANT CHANGE UP (ref 135–145)
TROPONIN T, HIGH SENSITIVITY RESULT: 33 NG/L — SIGNIFICANT CHANGE UP (ref 0–51)
WBC # BLD: 12.65 K/UL — HIGH (ref 3.8–10.5)
WBC # FLD AUTO: 12.65 K/UL — HIGH (ref 3.8–10.5)

## 2023-06-27 PROCEDURE — 93010 ELECTROCARDIOGRAM REPORT: CPT

## 2023-06-27 PROCEDURE — 71045 X-RAY EXAM CHEST 1 VIEW: CPT | Mod: 26

## 2023-06-27 RX ORDER — LOPERAMIDE HCL 2 MG
2 TABLET ORAL
Refills: 0 | Status: DISCONTINUED | OUTPATIENT
Start: 2023-06-27 | End: 2023-07-06

## 2023-06-27 RX ORDER — FUROSEMIDE 40 MG
40 TABLET ORAL DAILY
Refills: 0 | Status: DISCONTINUED | OUTPATIENT
Start: 2023-06-27 | End: 2023-07-06

## 2023-06-27 RX ORDER — DILTIAZEM HCL 120 MG
60 CAPSULE, EXT RELEASE 24 HR ORAL EVERY 6 HOURS
Refills: 0 | Status: DISCONTINUED | OUTPATIENT
Start: 2023-06-27 | End: 2023-07-03

## 2023-06-27 RX ADMIN — OLANZAPINE 10 MILLIGRAM(S): 15 TABLET, FILM COATED ORAL at 12:04

## 2023-06-27 RX ADMIN — PANTOPRAZOLE SODIUM 40 MILLIGRAM(S): 20 TABLET, DELAYED RELEASE ORAL at 06:45

## 2023-06-27 RX ADMIN — Medication 500 MILLIGRAM(S): at 15:10

## 2023-06-27 RX ADMIN — LOSARTAN POTASSIUM 100 MILLIGRAM(S): 100 TABLET, FILM COATED ORAL at 06:44

## 2023-06-27 RX ADMIN — DULOXETINE HYDROCHLORIDE 60 MILLIGRAM(S): 30 CAPSULE, DELAYED RELEASE ORAL at 06:51

## 2023-06-27 RX ADMIN — Medication 500 MILLIGRAM(S): at 14:14

## 2023-06-27 RX ADMIN — GABAPENTIN 300 MILLIGRAM(S): 400 CAPSULE ORAL at 18:03

## 2023-06-27 RX ADMIN — GABAPENTIN 300 MILLIGRAM(S): 400 CAPSULE ORAL at 06:42

## 2023-06-27 RX ADMIN — Medication 75 MILLIGRAM(S): at 06:43

## 2023-06-27 RX ADMIN — Medication 125 MICROGRAM(S): at 06:43

## 2023-06-27 RX ADMIN — Medication 81 MILLIGRAM(S): at 12:03

## 2023-06-27 RX ADMIN — PANTOPRAZOLE SODIUM 40 MILLIGRAM(S): 20 TABLET, DELAYED RELEASE ORAL at 18:04

## 2023-06-27 RX ADMIN — TIOTROPIUM BROMIDE 2 PUFF(S): 18 CAPSULE ORAL; RESPIRATORY (INHALATION) at 12:11

## 2023-06-27 RX ADMIN — Medication 400 MILLIGRAM(S): at 12:03

## 2023-06-27 RX ADMIN — Medication 60 MILLIGRAM(S): at 18:04

## 2023-06-27 RX ADMIN — Medication 10 MILLIGRAM(S): at 06:44

## 2023-06-27 RX ADMIN — Medication 40 MILLIGRAM(S): at 06:44

## 2023-06-27 RX ADMIN — ENOXAPARIN SODIUM 40 MILLIGRAM(S): 100 INJECTION SUBCUTANEOUS at 21:23

## 2023-06-27 RX ADMIN — Medication 10 MILLIGRAM(S): at 18:03

## 2023-06-27 RX ADMIN — DULOXETINE HYDROCHLORIDE 60 MILLIGRAM(S): 30 CAPSULE, DELAYED RELEASE ORAL at 18:03

## 2023-06-27 RX ADMIN — Medication 75 MILLIGRAM(S): at 18:14

## 2023-06-27 RX ADMIN — MONTELUKAST 10 MILLIGRAM(S): 4 TABLET, CHEWABLE ORAL at 12:04

## 2023-06-27 RX ADMIN — ATORVASTATIN CALCIUM 40 MILLIGRAM(S): 80 TABLET, FILM COATED ORAL at 21:23

## 2023-06-27 RX ADMIN — AMLODIPINE BESYLATE 5 MILLIGRAM(S): 2.5 TABLET ORAL at 06:42

## 2023-06-27 NOTE — PROGRESS NOTE ADULT - ASSESSMENT
72 f with    Acute on Chronic Systolic Congestive Heart Failure  - telemetry  - diurese  - echo  - cardiology follow    Back pain  - pain control  - Xray C T spine noted    Dysphagia  - esophagogram noted  - full liquid diet  - SS eval pending   - Gastroenterology follow     Cellulitis legs vs Dermatitis  - observe off antibiotic  - steroid cream   - observe    Anxiety / Depression   - control    Asthma   - nebs    Breast cancer in situ, left   - stable. Follow as OTP with Oncology    COPD (chronic obstructive pulmonary disease)   - nebs    Graves disease   - follow TSH    Hyperlipidemia   - stable    Hypertension   - control    Hypothyroid   - continue supplementation    Kidney cancer, primary, with metastasis from kidney to other site, left LEft sided- s/p nephrectomy only- no chemo or RT  - stable    Obesity   - Nutrition education    DVT prophylaxis  - AC     DCP in progress.     Nathan Mora MD phone [phone]

## 2023-06-27 NOTE — SWALLOW BEDSIDE ASSESSMENT ADULT - SWALLOW EVAL: DIAGNOSIS
72 year old female hx of COPD on 2L NC at home, Grave;s disease, HLD, HTN, breast cancer, kidney cancer, and skin cancer all in remission presenting with chief complaint of chest pain. Esophogram completed (6/26) revealed extensive esophageal reflux and extensive tertiary contractions. Bedside swallow evaluation completed. Patient presents with signs/symptoms suggestive of pharyngo-esophageal dysphagia. Pharyngeal stage of swallow marked by delayed swallow initiation, multiple swallow suggestive of post swallow residue,  as exam continued patient complained of chest tightness. Patient with reported irregular heart rate on tele. Exam terminated. RN alerted to patient complaints. Patient then with reported V-Tach on tele and RRT called. 72 year old female hx of COPD on 2L NC at home, Grave;s disease, HLD, HTN, breast cancer, kidney cancer, and skin cancer all in remission presenting with chief complaint of chest pain. Esophogram completed (6/26) revealed extensive esophageal reflux and extensive tertiary contractions. Bedside swallow evaluation completed. Patient presents with signs/symptoms suggestive of pharyngo-esophageal dysphagia. Pharyngeal stage of swallow marked by multiple swallow suggestive of post swallow residue. No changes in vocal quality noted. No overt s/s of aspiration or penetration observed.  As exam continued patient complained of chest tightness. Patient with reported irregular heart rate on tele. Exam terminated. RN alerted to patient complaints. Patient then with reported V-Tach on tele and RRT called. No overt s/s of laryngeal penetration/aspiration on exam. However, at end of eval, patient complained of chest tightness. Patient with reported irregular heart rate on tele. Exam terminated. RN alerted to patient complaints. Patient then with reported V-Tach on tele and RRT called.    72 year old female hx of COPD on 2L NC at home, Grave;s disease, HLD, HTN, breast cancer, kidney cancer, and skin cancer all in remission presenting with chief complaint of chest pain. Esophogram completed (6/26) revealed extensive esophageal reflux and extensive tertiary contractions. Bedside swallow evaluation completed. Patient presents with signs/symptoms suggestive of pharyngo-esophageal dysphagia. Pharyngeal stage of swallow marked by multiple swallow suggestive of post swallow residue and subjective c/o "it feels stuck" pointing to chest. No overt s/s of laryngeal penetration/aspiration on exam. However, at end of eval, patient complained of chest tightness. Patient with reported irregular heart rate on tele. Exam terminated. RN alerted to patient complaints. Patient then with reported V-Tach on tele and RRT called.    72 year old female hx of COPD on 2L NC at home, Grave;s disease, HLD, HTN, breast cancer, kidney cancer, and skin cancer all in remission presenting with chief complaint of chest pain. Esophogram completed (6/26) revealed extensive esophageal reflux and extensive tertiary contractions. Bedside swallow evaluation completed. Patient presents with signs/symptoms suggestive of pharyngo-esophageal dysphagia. Pharyngeal stage of swallow marked by multiple swallow suggestive of post swallow residue and subjective c/o "it feels stuck" pointing to chest. Incomplete dentition negatively impacting ability to safely chew solid food

## 2023-06-27 NOTE — SWALLOW BEDSIDE ASSESSMENT ADULT - SPECIFY REASON(S)
Subjectively assess s Subjectively assess swallow mechanism for safety of initiating oral diet as per discussion with GI PA Isabella Mott. Subjectively assess swallow mechanism, as per discussion with GI JOHNATHON Mott patient cleared for po trials of purees, solids and liquids

## 2023-06-27 NOTE — PROGRESS NOTE ADULT - ASSESSMENT
Dysphagia  IBS  Diarrhea    Speech and swallow eval. pending  Xray Esophagram with dysmotility; diet modification d/w pt  PPI BID  GI PCR negative  imodium prn diarrhea   aspiration precautions  will follow   d/w pt     I reviewed the overnight course of events on the unit, re-confirming the patient history. I discussed the care with the patient and their family  The plan of care was discussed with the physician assistant and modifications were made to the notation where appropriate.   Differential diagnosis and plan of care discussed with patient after the evaluation  35 minutes spent on total encounter of which more than fifty percent of the encounter was spent counseling and/or coordinating care by the attending physician.  Advanced care planning was discussed with patient and family.  Advanced care planning forms were reviewed and discussed.  Risks, benefits and alternatives of gastroenterologic procedures were discussed in detail and all questions were answered.

## 2023-06-27 NOTE — SWALLOW BEDSIDE ASSESSMENT ADULT - ORAL PHASE
Residue noted in oral cavity after the swallow. Liquid wash used to clear. Within functional limits trace-mild residue cleared with liquid wash/Decreased anterior-posterior movement of the bolus/Delayed oral transit time Residue noted in oral cavity after the swallow. Liquid wash used to clear./Decreased anterior-posterior movement of the bolus

## 2023-06-27 NOTE — SWALLOW BEDSIDE ASSESSMENT ADULT - SWALLOW EVAL: RECOMMENDED DIET
As per results of exam, patient cleared from an toshia-pharyngeal standpoint for soft& bite sized and thin liquids however, given complaints of chest tightness which may be correlated with oral intake and subsequent RRT. Dietary decision deferred to MD As per results of exam, patient cleared from an toshia-pharyngeal standpoint for soft & bite size and thin liquids however, given complaints of chest tightness which may be correlated with oral intake and subsequent RRT. Dietary decision deferred to MD

## 2023-06-27 NOTE — SWALLOW BEDSIDE ASSESSMENT ADULT - SLP GENERAL OBSERVATIONS
Patient received seated upright in bed, A&Ox4, on 4L NC. Patient able to follow directions for purposes of bedside swallow evaluation. Patient's  and son present for duration of evaluation. Patient received seated upright in bed, A&Ox4, on 4L NC. Patient able to follow directions for purposes of bedside swallow evaluation. Patient's  and son present for duration of evaluation. Pt endorses difficulty swallowing prior to admission. She reports sensation of chest tightness and "things feel stuck" pointing to chest area prior to admission.

## 2023-06-27 NOTE — SWALLOW BEDSIDE ASSESSMENT ADULT - SLP PERTINENT HISTORY OF CURRENT PROBLEM
72-year-old female history of COPD on 2 L nasal cannula at home, Graves' disease, HLD, HTN, breast cancer, kidney cancer, skin cancer all in remission presenting with a chief complaint of chest pain x2 months.  Patient states that pain radiates to her back and her mouth feels dry. Patient is also having difficulty swallowing. Patient states that she feels like she is having a choking sensation.  Patient describes symptoms as a tightness.  Patient states that she is coming in today because the symptoms are progressively getting worse.  Patient at baseline is not able to ambulate and does not know if movement makes symptoms worse.  Patient reports shortness of breath as well.  The patient's  at bedside reported that the patient has had leg swelling, cellulitis in the past, and has noticed that there is increased redness of the leg. Patient denies any recent illnesses, nausea, vomiting any headaches, visual changes, diarrhea, urinary symptoms.
72-year-old female history of COPD on 2 L nasal cannula at home, Graves' disease, HLD, HTN, breast cancer, kidney cancer, skin cancer all in remission presenting with a chief complaint of chest pain x2 months.  Patient states that pain radiates to her back and her mouth feels dry. Patient is also having difficulty swallowing. Patient states that she feels like she is having a choking sensation.  Patient describes symptoms as a tightness.  Patient states that she is coming in today because the symptoms are progressively getting worse.  Patient at baseline is not able to ambulate and does not know if movement makes symptoms worse.  Patient reports shortness of breath as well.  The patient's  at bedside reported that the patient has had leg swelling, cellulitis in the past, and has noticed that there is increased redness of the leg. Patient denies any recent illnesses, nausea, vomiting any headaches, visual changes, diarrhea, urinary symptoms.

## 2023-06-27 NOTE — CHART NOTE - NSCHARTNOTEFT_GEN_A_CORE
RRT called for 10 beats NSVT followed by chest pain. Pt remained hemodynamically stable during RRT. EKG with no T-wave abnormalities. Labs within normal range, mg 1.8 supplemented, CE wnl, CXR with no consolidation. Pt  present and updated at bedside. D/w GI, pt likely with esophageal spasms so started on Diltiazem q6 hrs. Continue to closely monitor. Refer to MAR note for further details.     Vital Signs Last 24 Hrs  T(C): 36.7 (27 Jun 2023 12:36), Max: 36.8 (27 Jun 2023 06:24)  T(F): 98.1 (27 Jun 2023 12:36), Max: 98.2 (27 Jun 2023 06:24)  HR: 82 (27 Jun 2023 12:36) (64 - 82)  BP: 131/63 (27 Jun 2023 12:36) (103/64 - 150/81)  BP(mean): 85 (27 Jun 2023 12:36) (85 - 85)  RR: 18 (27 Jun 2023 12:36) (18 - 18)  SpO2: 97% (27 Jun 2023 12:36) (95% - 98%)    Parameters below as of 27 Jun 2023 12:36  Patient On (Oxygen Delivery Method): nasal cannula  O2 Flow (L/min): 2    Vangie Ramirez NP   [id number]

## 2023-06-27 NOTE — SWALLOW BEDSIDE ASSESSMENT ADULT - ASPIRATION PRECAUTIONS
Monitor for s/s aspiration/laryngeal penetration. If noted:  D/C p.o. intake, provide non-oral nutrition/hydration/meds, and contact this service @ [id number]
yes

## 2023-06-27 NOTE — PROVIDER CONTACT NOTE (OTHER) - REASON
As per Telemetry, patient had 10 beats Wide complex Tach As per Telemetry, patient had 10 beats Wide complex  and VTach Episode

## 2023-06-27 NOTE — SWALLOW BEDSIDE ASSESSMENT ADULT - ADDITIONAL RECOMMENDATIONS
1. Tolerate dysphagia diet recommendation without overt s/s of aspiration.  2. Patient and family to demonstrate good carryover of safe swallow guidelines.

## 2023-06-27 NOTE — PROVIDER CONTACT NOTE (OTHER) - SITUATION
As per Telemetry, patient 10 beats WCT- Wide complex Tach. Patient complaining of upper chest tightness. As per Telemetry, patient 10 beats WCT- Wide complex and V Tach Episode . Patient complaining of upper chest tightness.

## 2023-06-27 NOTE — SWALLOW BEDSIDE ASSESSMENT ADULT - COMMENTS
6/24 Chart note: Patient c/o of anxiety  6/24 ED ADULT: ACP contacted regarding pt diet status. Pt passed dysphagia test. ACP to place orders for updated diet for pt to take her 6pm meds  GI consulted for dysphagia (6/24). Patient reports she has trouble swallowing when she feels chest tightness, shoulder, back pain and she gets the choking sensation and can't swallow.  RN at bedside and patient can swallow water without any difficulty. Patient states currently she is not experiencing any trouble swallowing.  Chest Xray (6/24) IMPRESSION: Mild pulmonary venous hypertension/pulmonary edema with small bilateral pleural effusions.  CT Chest (6/24) IMPRESSION: Emphysema. Indeterminate 2.8 cm hypodense lesion in the spleen.    TTE (6/25) CONCLUSIONS:  1. Normal left ventricular cavity size. The left ventricular wall thickness is normal. The left ventricular systolic function is normal with an ejection fraction of 71 % by Nagel's method of disks. There are no regional wall motion abnormalities seen.  2. There is mild (grade 1) left ventricular diastolic dysfunction.  3. Normal atria.  4. Normal right ventricular cavity size, normal right ventricular wall thickness and normal right ventricular systolic function.  5. No significant valvular disease  6. Compared to the transthoracic echocardiogram performed on 3/22/2023 there have been no significant interval changes.  Cardiology consulted (6/26) cardiac stable.   GI consulted (6/26): Patient completed esophagram. The esophagus demonstrates normal distensibility and course. However, extensive tertiary contractions are seen throughout the esophagus. There is extensive esophageal reflux. There is no abnormal extrinsic mass effect. Contrast passes freely into the stomach. There is no definite hiatal hernia.

## 2023-06-27 NOTE — SWALLOW BEDSIDE ASSESSMENT ADULT - ORAL PREPARATORY PHASE
Within functional limits decreased mastication ability secondary to incomplete dentition./Decreased mastication ability slow but adequate mastication

## 2023-06-27 NOTE — RAPID RESPONSE TEAM SUMMARY - NSSITUATIONBACKGROUNDRRT_GEN_ALL_CORE
72-year-old female history of COPD on 2 L nasal cannula at home, Graves' disease, HLD, HTN, breast cancer, kidney cancer, skin cancer all in remission presenting with a chief complaint of chest pain x2 months.     RRT was called for chest pain. Patient seen at bedside vitals appear at baseline on 2L NC. Exam revealed reproducible chest pain located left substernum with palpation. Per patient the pain has been unchanged since admission. RRR. Chest CTA b/l. Patient speaking full sentences w/o accessory muscle use, answering questions appropriately and stated her pain began after the swallow study and currently feels like there is something stuck in her throat. There was questionable concern for 10 beats of wide complex beats however patient was hemodynamically stable throughout event and unchanged complaint. EKG obtained revealed no acute Twave abnormalities, ST segment changes or new LBBB. Additionally EKG compared to prior looks unchanged. Will obtain CBC, CMP, Trops, CKMB, and CMP but at this time the chest pain does not appear to be cardiac related. Will give naproxen 1x for symptomatic relief as chest pain more consistent with MSK.  72-year-old female history of COPD on 2 L nasal cannula at home, Graves' disease, HLD, HTN, breast cancer, kidney cancer, skin cancer all in remission presenting with a chief complaint of chest pain x2 months.     RRT was called for chest pain. Patient seen at bedside vitals appear at baseline on 2L NC. Exam revealed reproducible chest pain located left substernum with palpation. Per patient the pain has been unchanged since admission. RRR. Chest CTA b/l. Patient speaking full sentences w/o accessory muscle use, answering questions appropriately and stated her pain began after the swallow study and currently feels like there is something stuck in her throat. Not diaphoretic. There was questionable concern for 10 beats of wide complex beats however patient was hemodynamically stable throughout event and unchanged complaint. EKG obtained revealed no acute Twave abnormalities, ST segment changes or new LBBB. Additionally EKG compared to prior looks unchanged. Will obtain CBC, CMP, Trops, CKMB, and CMP but at this time the chest pain does not appear to be cardiac related. Will give naproxen 1x for symptomatic relief as chest pain more consistent with MSK.

## 2023-06-27 NOTE — SWALLOW BEDSIDE ASSESSMENT ADULT - CONSISTENCIES ADMINISTERED
thin liquid pureed/soft & bite-sized/regular solid regular solid thin liquid/pureed soft & bite-sized

## 2023-06-27 NOTE — SWALLOW BEDSIDE ASSESSMENT ADULT - SWALLOW EVAL: RECOMMENDED FEEDING/EATING TECHNIQUES
Upright posture during/after eating for at least 1 hour./allow for swallow between intakes/check mouth frequently for oral residue/pocketing/crush medication (when feasible)/oral hygiene/position upright (90 degrees)/small sips/bites

## 2023-06-27 NOTE — SWALLOW BEDSIDE ASSESSMENT ADULT - NS ASR SWALLOW FINDINGS DISCUS
----- Message from Mohan Gomes sent at 7/24/2017  9:40 AM CDT -----  Contact: Pt   Pt would like a call back from nurse    Pt did not state reason for call back    Can be reached at [phone]  
Discussed with ADDI Gerardo and ACP Eleazar./Physician/Nursing
ADDI Chu, DEMARCO Vences.

## 2023-06-28 LAB
ANION GAP SERPL CALC-SCNC: 13 MMOL/L — SIGNIFICANT CHANGE UP (ref 5–17)
BUN SERPL-MCNC: 12 MG/DL — SIGNIFICANT CHANGE UP (ref 7–23)
CALCIUM SERPL-MCNC: 10 MG/DL — SIGNIFICANT CHANGE UP (ref 8.4–10.5)
CHLORIDE SERPL-SCNC: 96 MMOL/L — SIGNIFICANT CHANGE UP (ref 96–108)
CK MB CFR SERPL CALC: 1.4 NG/ML — SIGNIFICANT CHANGE UP (ref 0–3.8)
CK SERPL-CCNC: 28 U/L — SIGNIFICANT CHANGE UP (ref 25–170)
CO2 SERPL-SCNC: 33 MMOL/L — HIGH (ref 22–31)
CREAT SERPL-MCNC: 0.99 MG/DL — SIGNIFICANT CHANGE UP (ref 0.5–1.3)
EGFR: 60 ML/MIN/1.73M2 — SIGNIFICANT CHANGE UP
GLUCOSE SERPL-MCNC: 111 MG/DL — HIGH (ref 70–99)
HCT VFR BLD CALC: 43 % — SIGNIFICANT CHANGE UP (ref 34.5–45)
HGB BLD-MCNC: 13.3 G/DL — SIGNIFICANT CHANGE UP (ref 11.5–15.5)
MAGNESIUM SERPL-MCNC: 1.8 MG/DL — SIGNIFICANT CHANGE UP (ref 1.6–2.6)
MCHC RBC-ENTMCNC: 30.8 PG — SIGNIFICANT CHANGE UP (ref 27–34)
MCHC RBC-ENTMCNC: 30.9 GM/DL — LOW (ref 32–36)
MCV RBC AUTO: 99.5 FL — SIGNIFICANT CHANGE UP (ref 80–100)
NRBC # BLD: 0 /100 WBCS — SIGNIFICANT CHANGE UP (ref 0–0)
PLATELET # BLD AUTO: 147 K/UL — LOW (ref 150–400)
POTASSIUM SERPL-MCNC: 3.4 MMOL/L — LOW (ref 3.5–5.3)
POTASSIUM SERPL-SCNC: 3.4 MMOL/L — LOW (ref 3.5–5.3)
RBC # BLD: 4.32 M/UL — SIGNIFICANT CHANGE UP (ref 3.8–5.2)
RBC # FLD: 14.1 % — SIGNIFICANT CHANGE UP (ref 10.3–14.5)
SODIUM SERPL-SCNC: 142 MMOL/L — SIGNIFICANT CHANGE UP (ref 135–145)
TROPONIN T, HIGH SENSITIVITY RESULT: 31 NG/L — SIGNIFICANT CHANGE UP (ref 0–51)
TROPONIN T, HIGH SENSITIVITY RESULT: 34 NG/L — SIGNIFICANT CHANGE UP (ref 0–51)
WBC # BLD: 7.89 K/UL — SIGNIFICANT CHANGE UP (ref 3.8–10.5)
WBC # FLD AUTO: 7.89 K/UL — SIGNIFICANT CHANGE UP (ref 3.8–10.5)

## 2023-06-28 PROCEDURE — 93010 ELECTROCARDIOGRAM REPORT: CPT

## 2023-06-28 PROCEDURE — 99221 1ST HOSP IP/OBS SF/LOW 40: CPT

## 2023-06-28 RX ORDER — ALBUTEROL 90 UG/1
2 AEROSOL, METERED ORAL EVERY 6 HOURS
Refills: 0 | Status: DISCONTINUED | OUTPATIENT
Start: 2023-06-28 | End: 2023-07-06

## 2023-06-28 RX ORDER — POTASSIUM CHLORIDE 20 MEQ
40 PACKET (EA) ORAL ONCE
Refills: 0 | Status: COMPLETED | OUTPATIENT
Start: 2023-06-28 | End: 2023-06-28

## 2023-06-28 RX ADMIN — Medication 60 MILLIGRAM(S): at 11:46

## 2023-06-28 RX ADMIN — PANTOPRAZOLE SODIUM 40 MILLIGRAM(S): 20 TABLET, DELAYED RELEASE ORAL at 17:31

## 2023-06-28 RX ADMIN — Medication 10 MILLIGRAM(S): at 17:29

## 2023-06-28 RX ADMIN — Medication 60 MILLIGRAM(S): at 00:20

## 2023-06-28 RX ADMIN — Medication 81 MILLIGRAM(S): at 11:46

## 2023-06-28 RX ADMIN — ALBUTEROL 2 PUFF(S): 90 AEROSOL, METERED ORAL at 22:35

## 2023-06-28 RX ADMIN — PANTOPRAZOLE SODIUM 40 MILLIGRAM(S): 20 TABLET, DELAYED RELEASE ORAL at 05:53

## 2023-06-28 RX ADMIN — Medication 40 MILLIGRAM(S): at 05:56

## 2023-06-28 RX ADMIN — Medication 0.5 MILLIGRAM(S): at 22:45

## 2023-06-28 RX ADMIN — OLANZAPINE 10 MILLIGRAM(S): 15 TABLET, FILM COATED ORAL at 11:45

## 2023-06-28 RX ADMIN — Medication 125 MICROGRAM(S): at 05:56

## 2023-06-28 RX ADMIN — Medication 60 MILLIGRAM(S): at 05:56

## 2023-06-28 RX ADMIN — AMLODIPINE BESYLATE 5 MILLIGRAM(S): 2.5 TABLET ORAL at 05:55

## 2023-06-28 RX ADMIN — ATORVASTATIN CALCIUM 40 MILLIGRAM(S): 80 TABLET, FILM COATED ORAL at 21:35

## 2023-06-28 RX ADMIN — GABAPENTIN 300 MILLIGRAM(S): 400 CAPSULE ORAL at 17:30

## 2023-06-28 RX ADMIN — Medication 75 MILLIGRAM(S): at 17:30

## 2023-06-28 RX ADMIN — ENOXAPARIN SODIUM 40 MILLIGRAM(S): 100 INJECTION SUBCUTANEOUS at 21:35

## 2023-06-28 RX ADMIN — GABAPENTIN 300 MILLIGRAM(S): 400 CAPSULE ORAL at 05:55

## 2023-06-28 RX ADMIN — MONTELUKAST 10 MILLIGRAM(S): 4 TABLET, CHEWABLE ORAL at 11:46

## 2023-06-28 RX ADMIN — Medication 10 MILLIGRAM(S): at 05:55

## 2023-06-28 RX ADMIN — Medication 40 MILLIEQUIVALENT(S): at 11:47

## 2023-06-28 RX ADMIN — LOSARTAN POTASSIUM 100 MILLIGRAM(S): 100 TABLET, FILM COATED ORAL at 05:55

## 2023-06-28 RX ADMIN — Medication 75 MILLIGRAM(S): at 05:56

## 2023-06-28 RX ADMIN — DULOXETINE HYDROCHLORIDE 60 MILLIGRAM(S): 30 CAPSULE, DELAYED RELEASE ORAL at 17:29

## 2023-06-28 RX ADMIN — Medication 400 MILLIGRAM(S): at 11:46

## 2023-06-28 RX ADMIN — DULOXETINE HYDROCHLORIDE 60 MILLIGRAM(S): 30 CAPSULE, DELAYED RELEASE ORAL at 05:54

## 2023-06-28 RX ADMIN — Medication 60 MILLIGRAM(S): at 17:30

## 2023-06-28 NOTE — BH CONSULTATION LIAISON ASSESSMENT NOTE - HPI (INCLUDE ILLNESS QUALITY, SEVERITY, DURATION, TIMING, CONTEXT, MODIFYING FACTORS, ASSOCIATED SIGNS AND SYMPTOMS)
Pt is a 72 year old  white female, domiciled with  and son, hx of COPD  Graves' disease, HLD, HTN, breast cancer, kidney cancer, skin cancer all in remission. PPHx includes one previous psych hospitalization years ago and anxiety, which she has been seeing Dr. Vega for the past 2 years via telehealth. Psychiatric consulted for current symptoms possibly be due to anxiety. Pt is on 2L nasal cannula, alprazolam .25 mg PO and melatonin PRN. Upon evaluation, pt is A&O x 3, patient appeared to be calm, not in distress, denies si/hi, no hx of self injuries or suicide attempts, states she is having sleep disturbances that is being managed with the melatonin, states she is currently feeling anxious because the psych team "is making her feel like a psychotic patient with these questions", states she was not having these feelings prior to the psych consultation. Denies use of ETOH, tobacco, and illicit drugs.       Pt is a 72 year old  white female, domiciled with  and son, hx of COPD  Graves' disease, HLD, HTN, breast cancer, kidney cancer, skin cancer all in remission. PPHx includes one previous psych hospitalization years ago and anxiety, which she has been seeing Dr. Vega for the past 2 years via telehealth at [facility]. Psych called for anxiety. pt at this time denies anxiety related symptoms, no depression, sleep fragmented while in hospital. no si/hi, and denies manic or psychotic symptoms. denies substance abuse problems. pt happy with current psychotropic medication regimen, does not wish to change anything.

## 2023-06-28 NOTE — PROGRESS NOTE ADULT - ASSESSMENT
Dysphagia  IBS  Diarrhea    Speech and swallow eval.  noted  diet per SLP recommendations   Xray Esophagram with dysmotility; diet modification d/w pt  started on diltiazem QID for esophageal spasms   PPI BID  GI PCR negative  imodium prn diarrhea   aspiration precautions  cardiology f/u  will follow   d/w pt     I reviewed the overnight course of events on the unit, re-confirming the patient history. I discussed the care with the patient and their family  The plan of care was discussed with the physician assistant and modifications were made to the notation where appropriate.   Differential diagnosis and plan of care discussed with patient after the evaluation  35 minutes spent on total encounter of which more than fifty percent of the encounter was spent counseling and/or coordinating care by the attending physician.  Advanced care planning was discussed with patient and family.  Advanced care planning forms were reviewed and discussed.  Risks, benefits and alternatives of gastroenterologic procedures were discussed in detail and all questions were answered.

## 2023-06-28 NOTE — CHART NOTE - NSCHARTNOTEFT_GEN_A_CORE
Medicine PA Note    Informed by RN that pt c/o 9/10 chest pain. Pt seen and assessed     Vital Signs Last 24 Hrs  T(C): 37.7 (28 Jun 2023 06:00), Max: 37.7 (28 Jun 2023 06:00)  T(F): 99.9 (28 Jun 2023 06:00), Max: 99.9 (28 Jun 2023 06:00)  HR: 86 (28 Jun 2023 06:00) (69 - 86)  BP: 130/77 (28 Jun 2023 06:00) (97/62 - 142/80)  BP(mean): 85 (27 Jun 2023 12:36) (85 - 85)  RR: 18 (28 Jun 2023 06:00) (18 - 18)  SpO2: 95% (28 Jun 2023 06:00) (93% - 97%)    Parameters below as of 28 Jun 2023 06:00  Patient On (Oxygen Delivery Method): nasal cannula  O2 Flow (L/min): 2      Physical Exam:  General: WN/WD NAD  Neurology: A&Ox3, nonfocal, TAYLOR x 4  Head:  Normocephalic, atraumatic  Respiratory: CTA B/L  CV: RRR, S1S2, no murmur  Abdominal: Soft, NT, ND no palpable mass  MSK: No edema, + peripheral pulses, FROM all 4 extremity  Labs:                          13.3   7.89  )-----------( 147      ( 28 Jun 2023 06:43 )             43.0     06-28    142  |  96  |  12  ----------------------------<  111<H>  3.4<L>   |  33<H>  |  0.99    Ca    10.0      28 Jun 2023 06:43  Phos  3.6     06-27  Mg     1.8     06-28      CARDIAC MARKERS ( 27 Jun 2023 13:09 )  x     / x     / 37 U/L / x     / 1.5 ng/mL          Radiology:    HPI:  72-year-old female history of COPD on 2 L nasal cannula at home, Graves' disease, HLD, HTN, breast cancer, kidney cancer, skin cancer all in remission presenting with a chief complaint of chest pain x2 months.  Patient states that pain radiates to her back and her mouth feels dry. Patient is also having difficulty swallowing. Patient states that she feels like she is having a choking sensation.  Patient describes symptoms as a tightness.  Patient states that she is coming in today because the symptoms are progressively getting worse.  Patient at baseline is not able to ambulate and does not know if movement makes symptoms worse.  Patient reports shortness of breath as well.  The patient's  is at bedside and reports that the patient also has had leg swelling.   reports that patient has had cellulitis in the past.  And he has noticed that there is increased redness of the leg.  Patient denies any recent illnesses, nausea, vomiting any headaches, visual changes, diarrhea, urinary symptoms. (24 Jun 2023 11:57)    Assessment & Plan:  >  >  >  >    Sheri Lewis PA-C Medicine PA Note    Informed by RN that pt c/o 9/10 chest pain. Pt seen and assessed at bedside, appearing anxious and c/o midsternal chest pain shortly after eating. She stated chest pain started after her son left from his afternoon visit. Pt states pain does not radiate and feels like a pressure in her chest. She denies shortness of breath, N/V, palpitations, sweating, and HA. VSS.    Vital Signs Last 24 Hrs  T(C): 37.7 (28 Jun 2023 06:00), Max: 37.7 (28 Jun 2023 06:00)  T(F): 99.9 (28 Jun 2023 06:00), Max: 99.9 (28 Jun 2023 06:00)  HR: 86 (28 Jun 2023 06:00) (69 - 86)  BP: 130/77 (28 Jun 2023 06:00) (97/62 - 142/80)  BP(mean): 85 (27 Jun 2023 12:36) (85 - 85)  RR: 18 (28 Jun 2023 06:00) (18 - 18)  SpO2: 95% (28 Jun 2023 06:00) (93% - 97%)    Parameters below as of 28 Jun 2023 06:00  Patient On (Oxygen Delivery Method): nasal cannula  O2 Flow (L/min): 2      Physical Exam:  General: WN/WD NAD, appears anxious, not diaphoretic  Neurology: A&Ox4  Head:  Normocephalic, atraumatic  Respiratory: CTA B/L  CV: pain on palpation of L side of sternum, +reproducible CP, RRR, S1S2  Abdominal: Soft, NT, ND    Labs:                          13.3   7.89  )-----------( 147      ( 28 Jun 2023 06:43 )             43.0     06-28    142  |  96  |  12  ----------------------------<  111<H>  3.4<L>   |  33<H>  |  0.99    Ca    10.0      28 Jun 2023 06:43  Phos  3.6     06-27  Mg     1.8     06-28      CARDIAC MARKERS ( 27 Jun 2023 13:09 )  x     / x     / 37 U/L / x     / 1.5 ng/mL      Assessment & Plan:    73 yo F with PMHx COPD on 2 L nasal cannula at home, Graves' disease, HLD, HTN, breast cancer, kidney cancer, skin cancer all in remission admitted with chest pain and dysphagia, c/o CP 9/10.    >EKG ordered stat  >EKG with no acute T-wave abnormalities, ST segment changes.  >Cardiac enzymes drawn stat and are negative.  >VSS, no events on tele noted  >Pt appearing anxious, asking to speak with psychiatry for management of anxiety that could be contributing to chest pain  >Psych consult called and emotional support provided  >Naproxen PRN  >Continue on tele  >Appreciate GI consult which relates pain to dysmotility and esophageal spasms. Continue with diltiazem QID for esophageal spasms.  >Discussed with Dr. Morgan Lewis PA-C  [id number]

## 2023-06-28 NOTE — BH CONSULTATION LIAISON ASSESSMENT NOTE - NSBHCHARTREVIEWVS_PSY_A_CORE FT
Vital Signs Last 24 Hrs  T(C): 36.4 (28 Jun 2023 12:37), Max: 37.7 (28 Jun 2023 06:00)  T(F): 97.5 (28 Jun 2023 12:37), Max: 99.9 (28 Jun 2023 06:00)  HR: 55 (28 Jun 2023 12:37) (55 - 86)  BP: 135/76 (28 Jun 2023 12:37) (97/62 - 142/80)  BP(mean): --  RR: 18 (28 Jun 2023 12:37) (18 - 18)  SpO2: 94% (28 Jun 2023 12:37) (93% - 96%)    Parameters below as of 28 Jun 2023 12:37  Patient On (Oxygen Delivery Method): nasal cannula

## 2023-06-28 NOTE — PROGRESS NOTE ADULT - ASSESSMENT
72 f with    Acute on Chronic Systolic Congestive Heart Failure  - telemetry  - diurese  - echo noted  - cardiology follow    Back pain  - pain control  - Xray C T spine noted    Dysphagia  - esophagogram noted  - CCB for esophageal spasms  - SS eval pending   - Gastroenterology follow     Cellulitis legs vs Dermatitis  - observe off antibiotic  - steroid cream   - observe    Anxiety / Depression   - control  - Psych evaluation  - Ativan 0.5 mg bid prn    Asthma   - nebs    Breast cancer in situ, left   - stable. Follow as OTP with Oncology    COPD (chronic obstructive pulmonary disease)   - nebs    Graves disease   - follow TSH    Hyperlipidemia   - stable    Hypertension   - control    Hypothyroid   - continue supplementation    Kidney cancer, primary, with metastasis from kidney to other site, left LEft sided- s/p nephrectomy only- no chemo or RT  - stable    Obesity   - Nutrition education    DVT prophylaxis  - AC     DCP in progress.     Nathan Mora MD phone [phone]

## 2023-06-28 NOTE — BH CONSULTATION LIAISON ASSESSMENT NOTE - SUMMARY
Pt is a 72 year old  white female, domiciled with  and son, hx of COPD  Graves' disease, HLD, HTN, breast cancer, kidney cancer, skin cancer all in remission. PPHx includes one previous psych hospitalization years ago and anxiety, which she has been seeing Dr. Vega for the past 2 years via telehealth at [facility]. Psych called for anxiety. pt at this time denies anxiety related symptoms, no depression, sleep fragmented while in hospital. no si/hi, and denies manic or psychotic symptoms. denies substance abuse problems. pt happy with current psychotropic medication regimen, does not wish to change anything.

## 2023-06-28 NOTE — BH CONSULTATION LIAISON ASSESSMENT NOTE - FAMILY HISTORY OF PSYCHIATRIC ILLNESS / SUICIDALITY
Subjective:      Patient ID: Brianna Randhawa is a 53 y.o. female.    Chief Complaint: Annual Exam    Brianna Randhawa a 53 y.o. female presents for follow up on all regular problems which are reviewed and discussed.     Problem List Items Addressed This Visit          Psychiatric    ADD (attention deficit disorder)       Cardiac/Vascular    Dyslipidemia    Hypertension       Endocrine    Type 2 diabetes mellitus without complication, without long-term current use of insulin - Primary    Relevant Medications    semaglutide (OZEMPIC) 0.25 mg or 0.5 mg(2 mg/1.5 mL) pen injector    Other Relevant Orders    CBC Auto Differential (Completed)    Comprehensive Metabolic Panel (Completed)    Lipid Panel (Completed)    TSH (Completed)    Urinalysis (Completed)    Hemoglobin A1C (Completed)       Past Medical History:  Past Medical History:   Diagnosis Date    GERD (gastroesophageal reflux disease)     Hyperlipidemia     Hypertension      Past Surgical History:   Procedure Laterality Date    APPENDECTOMY      CHOLECYSTECTOMY      HYSTERECTOMY      KNEE ARTHROSCOPY Right     TONSILLECTOMY      TUBAL LIGATION       Review of patient's allergies indicates:  No Known Allergies  Current Outpatient Medications on File Prior to Visit   Medication Sig Dispense Refill    atorvastatin (LIPITOR) 80 MG tablet Take 1 tablet (80 mg total) by mouth once daily. 90 tablet 3    dextroamphetamine-amphetamine (ADDERALL) 20 mg tablet TAKE ONE (1) TABLET BY MOUTH TWICE DAILY  Strength: 20 mg 60 tablet 0    lisinopriL (PRINIVIL,ZESTRIL) 20 MG tablet Take 1 tablet (20 mg total) by mouth once daily. 90 tablet 3    SITagliptan-metformin (JANUMET XR) 50-1,000 mg TM24 Take 2 tablets by mouth once daily. 180 tablet 3    zolpidem (AMBIEN) 10 mg Tab TAKE ONE (1) TABLET BY MOUTH EVERY NIGHT AT BEDTIME 90 tablet 1    nitrofurantoin, macrocrystal-monohydrate, (MACROBID) 100 MG capsule Take 1 capsule (100 mg total) by mouth 2 (two) times  "daily. (Patient not taking: Reported on 2022) 14 capsule 1     No current facility-administered medications on file prior to visit.     Social History     Socioeconomic History    Marital status:    Tobacco Use    Smoking status: Every Day     Packs/day: 0.50     Years: 20.00     Pack years: 10.00     Types: Cigarettes     Last attempt to quit: 2021     Years since quittin.1    Smokeless tobacco: Never   Substance and Sexual Activity    Alcohol use: Not Currently    Drug use: Never   Social History Narrative    Having Gastric Sleeve in [place] but does not have a date set yet.     Family History   Problem Relation Age of Onset    Parkinsonism Mother     Hypertension Father     Diabetes Sister     Stomach cancer Maternal Grandmother        Review of Systems   Constitutional:  Negative for activity change and unexpected weight change.   HENT:  Negative for hearing loss, rhinorrhea and trouble swallowing.    Eyes:  Negative for discharge and visual disturbance.   Respiratory:  Negative for chest tightness and wheezing.    Cardiovascular:  Negative for chest pain and palpitations.   Gastrointestinal:  Negative for blood in stool, constipation, diarrhea and vomiting.   Endocrine: Negative for polydipsia and polyuria.   Genitourinary:  Negative for difficulty urinating, enuresis, flank pain, hematuria, vaginal discharge and vaginal pain.   Musculoskeletal:  Positive for arthralgias. Negative for joint swelling and neck pain.   Neurological:  Negative for weakness and headaches.   Psychiatric/Behavioral:  Negative for confusion and dysphoric mood.      Objective:     /60 (BP Location: Left arm, Patient Position: Sitting, BP Method: Large (Manual))   Pulse (!) 126   Resp 16   Ht 5' 4" (1.626 m)   Wt 89.7 kg (197 lb 12.8 oz)   SpO2 97%   BMI 33.95 kg/m²     Physical Exam  Constitutional:       General: She is not in acute distress.     Appearance: Normal appearance. She is obese. She " None known is not toxic-appearing.   HENT:      Head: Normocephalic and atraumatic.      Right Ear: External ear normal.      Left Ear: External ear normal.      Nose: Nose normal.      Mouth/Throat:      Mouth: Mucous membranes are moist.      Pharynx: Oropharynx is clear.   Eyes:      Pupils: Pupils are equal, round, and reactive to light.   Neck:      Vascular: No carotid bruit.   Cardiovascular:      Rate and Rhythm: Normal rate and regular rhythm.      Heart sounds: Normal heart sounds. No murmur heard.    No gallop.   Pulmonary:      Effort: Pulmonary effort is normal.      Breath sounds: Normal breath sounds. No stridor. No rhonchi.   Abdominal:      Palpations: Abdomen is soft.   Musculoskeletal:      Cervical back: Normal range of motion and neck supple. No tenderness.   Skin:     General: Skin is warm and dry.   Neurological:      General: No focal deficit present.      Mental Status: She is alert and oriented to person, place, and time. Mental status is at baseline.   Psychiatric:         Mood and Affect: Mood normal.         Behavior: Behavior normal.         Thought Content: Thought content normal.         Judgment: Judgment normal.   Assessment:     1. Type 2 diabetes mellitus without complication, without long-term current use of insulin    2. Attention deficit disorder, unspecified hyperactivity presence    3. Dyslipidemia    4. Hypertension, unspecified type        Plan:     Problem List Items Addressed This Visit          Psychiatric    ADD (attention deficit disorder)       Cardiac/Vascular    Dyslipidemia    Hypertension       Endocrine    Type 2 diabetes mellitus without complication, without long-term current use of insulin - Primary    Relevant Medications    semaglutide (OZEMPIC) 0.25 mg or 0.5 mg(2 mg/1.5 mL) pen injector    Other Relevant Orders    CBC Auto Differential (Completed)    Comprehensive Metabolic Panel (Completed)    Lipid Panel (Completed)    TSH (Completed)    Urinalysis (Completed)     Hemoglobin A1C (Completed)     No follow-ups on file.  6m fu  titrate ozempic    I am having Brianna Randhawa start on OZEMPIC. I am also having her maintain her JANUMET XR, atorvastatin, lisinopriL, nitrofurantoin (macrocrystal-monohydrate), zolpidem, and dextroamphetamine-amphetamine.    Brianna was seen today for annual exam.    Diagnoses and all orders for this visit:    Type 2 diabetes mellitus without complication, without long-term current use of insulin  -     CBC Auto Differential; Future  -     Comprehensive Metabolic Panel; Future  -     Lipid Panel; Future  -     TSH; Future  -     Urinalysis; Future  -     Hemoglobin A1C; Future    Attention deficit disorder, unspecified hyperactivity presence    Dyslipidemia    Hypertension, unspecified type    Other orders  -     semaglutide (OZEMPIC) 0.25 mg or 0.5 mg(2 mg/1.5 mL) pen injector; Inject 0.25 mg into the skin every 7 days.      Medications Ordered This Encounter   Medications    semaglutide (OZEMPIC) 0.25 mg or 0.5 mg(2 mg/1.5 mL) pen injector     Sig: Inject 0.25 mg into the skin every 7 days.     Dispense:  1 pen     Refill:  5     [unfilled]  Orders Placed This Encounter   Procedures    CBC Auto Differential     Standing Status:   Future     Number of Occurrences:   1     Standing Expiration Date:   3/24/2024    Comprehensive Metabolic Panel     Standing Status:   Future     Number of Occurrences:   1     Standing Expiration Date:   3/24/2024    Lipid Panel     Standing Status:   Future     Number of Occurrences:   1     Standing Expiration Date:   3/24/2024    TSH     Standing Status:   Future     Number of Occurrences:   1     Standing Expiration Date:   3/24/2024    Urinalysis     Standing Status:   Future     Number of Occurrences:   1     Standing Expiration Date:   3/24/2024     Order Specific Question:   Collection Type     Answer:   Urine, Clean Catch    Hemoglobin A1C     Standing Status:   Future     Number of Occurrences:   1      Standing Expiration Date:   3/24/2024

## 2023-06-28 NOTE — BH CONSULTATION LIAISON ASSESSMENT NOTE - CURRENT MEDICATION
MEDICATIONS  (STANDING):  amLODIPine   Tablet 5 milliGRAM(s) Oral daily  aspirin enteric coated 81 milliGRAM(s) Oral daily  atorvastatin 40 milliGRAM(s) Oral at bedtime  busPIRone 10 milliGRAM(s) Oral two times a day  diltiazem    Tablet 60 milliGRAM(s) Oral every 6 hours  DULoxetine 60 milliGRAM(s) Oral two times a day  enoxaparin Injectable 40 milliGRAM(s) SubCutaneous every 24 hours  furosemide    Tablet 40 milliGRAM(s) Oral daily  gabapentin 300 milliGRAM(s) Oral two times a day  hydrALAZINE 75 milliGRAM(s) Oral two times a day  levothyroxine 125 MICROGram(s) Oral daily  losartan 100 milliGRAM(s) Oral daily  montelukast 10 milliGRAM(s) Oral daily  OLANZapine 10 milliGRAM(s) Oral daily  pantoprazole  Injectable 40 milliGRAM(s) IV Push two times a day  pentoxifylline 400 milliGRAM(s) Oral daily  propranolol 20 milliGRAM(s) Oral two times a day  tiotropium 2.5 MICROgram(s) Inhaler 2 Puff(s) Inhalation daily    MEDICATIONS  (PRN):  acetaminophen     Tablet .. 650 milliGRAM(s) Oral every 6 hours PRN Temp greater or equal to 38.5C (101.3F), Mild Pain (1 - 3)  loperamide 2 milliGRAM(s) Oral two times a day PRN Diarrhea  melatonin 3 milliGRAM(s) Oral at bedtime PRN Insomnia  oxycodone    5 mG/acetaminophen 325 mG 1 Tablet(s) Oral every 6 hours PRN Moderate Pain (4 - 6)

## 2023-06-28 NOTE — BH CONSULTATION LIAISON ASSESSMENT NOTE - NSBHCONSULTRECOMMENDOTHER_PSY_A_CORE FT
-continue pt's current outpt psychiatric medications for mood stability  -for increased anxiety, may provide ativan 0.5 mg po BID PRN  -supportive therapy and psychoeducation provided

## 2023-06-28 NOTE — BH CONSULTATION LIAISON ASSESSMENT NOTE - OTHER PAST PSYCHIATRIC HISTORY (INCLUDE DETAILS REGARDING ONSET, COURSE OF ILLNESS, INPATIENT/OUTPATIENT TREATMENT)
Stated she had one past psych hospitalization in 1982, reason unknown. Currently seeing psychiatrist, Dr. Vega for the past 2 years, via telehealth for anxiety.

## 2023-06-29 LAB
ANION GAP SERPL CALC-SCNC: 14 MMOL/L — SIGNIFICANT CHANGE UP (ref 5–17)
BUN SERPL-MCNC: 13 MG/DL — SIGNIFICANT CHANGE UP (ref 7–23)
CALCIUM SERPL-MCNC: 9.5 MG/DL — SIGNIFICANT CHANGE UP (ref 8.4–10.5)
CHLORIDE SERPL-SCNC: 97 MMOL/L — SIGNIFICANT CHANGE UP (ref 96–108)
CO2 SERPL-SCNC: 29 MMOL/L — SIGNIFICANT CHANGE UP (ref 22–31)
CREAT SERPL-MCNC: 1.02 MG/DL — SIGNIFICANT CHANGE UP (ref 0.5–1.3)
EGFR: 58 ML/MIN/1.73M2 — LOW
GLUCOSE SERPL-MCNC: 120 MG/DL — HIGH (ref 70–99)
MAGNESIUM SERPL-MCNC: 1.6 MG/DL — SIGNIFICANT CHANGE UP (ref 1.6–2.6)
POTASSIUM SERPL-MCNC: 3.4 MMOL/L — LOW (ref 3.5–5.3)
POTASSIUM SERPL-SCNC: 3.4 MMOL/L — LOW (ref 3.5–5.3)
SODIUM SERPL-SCNC: 140 MMOL/L — SIGNIFICANT CHANGE UP (ref 135–145)

## 2023-06-29 RX ORDER — POTASSIUM CHLORIDE 20 MEQ
20 PACKET (EA) ORAL
Refills: 0 | Status: DISCONTINUED | OUTPATIENT
Start: 2023-06-29 | End: 2023-06-29

## 2023-06-29 RX ORDER — SUCRALFATE 1 G
1 TABLET ORAL
Refills: 0 | Status: DISCONTINUED | OUTPATIENT
Start: 2023-06-29 | End: 2023-07-06

## 2023-06-29 RX ORDER — POTASSIUM CHLORIDE 20 MEQ
20 PACKET (EA) ORAL
Refills: 0 | Status: COMPLETED | OUTPATIENT
Start: 2023-06-29 | End: 2023-06-29

## 2023-06-29 RX ADMIN — OLANZAPINE 10 MILLIGRAM(S): 15 TABLET, FILM COATED ORAL at 12:43

## 2023-06-29 RX ADMIN — PANTOPRAZOLE SODIUM 40 MILLIGRAM(S): 20 TABLET, DELAYED RELEASE ORAL at 17:12

## 2023-06-29 RX ADMIN — MONTELUKAST 10 MILLIGRAM(S): 4 TABLET, CHEWABLE ORAL at 12:42

## 2023-06-29 RX ADMIN — DULOXETINE HYDROCHLORIDE 60 MILLIGRAM(S): 30 CAPSULE, DELAYED RELEASE ORAL at 17:11

## 2023-06-29 RX ADMIN — AMLODIPINE BESYLATE 5 MILLIGRAM(S): 2.5 TABLET ORAL at 05:42

## 2023-06-29 RX ADMIN — ATORVASTATIN CALCIUM 40 MILLIGRAM(S): 80 TABLET, FILM COATED ORAL at 22:07

## 2023-06-29 RX ADMIN — Medication 60 MILLIGRAM(S): at 12:43

## 2023-06-29 RX ADMIN — Medication 60 MILLIGRAM(S): at 22:07

## 2023-06-29 RX ADMIN — Medication 60 MILLIGRAM(S): at 17:11

## 2023-06-29 RX ADMIN — Medication 400 MILLIGRAM(S): at 12:43

## 2023-06-29 RX ADMIN — Medication 125 MICROGRAM(S): at 05:41

## 2023-06-29 RX ADMIN — Medication 1 GRAM(S): at 13:46

## 2023-06-29 RX ADMIN — Medication 60 MILLIGRAM(S): at 04:28

## 2023-06-29 RX ADMIN — Medication 81 MILLIGRAM(S): at 12:42

## 2023-06-29 RX ADMIN — Medication 75 MILLIGRAM(S): at 17:12

## 2023-06-29 RX ADMIN — Medication 1 GRAM(S): at 17:11

## 2023-06-29 RX ADMIN — LOSARTAN POTASSIUM 100 MILLIGRAM(S): 100 TABLET, FILM COATED ORAL at 06:06

## 2023-06-29 RX ADMIN — PANTOPRAZOLE SODIUM 40 MILLIGRAM(S): 20 TABLET, DELAYED RELEASE ORAL at 05:42

## 2023-06-29 RX ADMIN — ENOXAPARIN SODIUM 40 MILLIGRAM(S): 100 INJECTION SUBCUTANEOUS at 22:07

## 2023-06-29 RX ADMIN — Medication 1 GRAM(S): at 22:07

## 2023-06-29 RX ADMIN — Medication 75 MILLIGRAM(S): at 05:42

## 2023-06-29 RX ADMIN — GABAPENTIN 300 MILLIGRAM(S): 400 CAPSULE ORAL at 17:11

## 2023-06-29 RX ADMIN — Medication 10 MILLIGRAM(S): at 05:41

## 2023-06-29 RX ADMIN — GABAPENTIN 300 MILLIGRAM(S): 400 CAPSULE ORAL at 05:41

## 2023-06-29 RX ADMIN — Medication 10 MILLIGRAM(S): at 17:12

## 2023-06-29 RX ADMIN — Medication 0.5 MILLIGRAM(S): at 07:48

## 2023-06-29 RX ADMIN — Medication 40 MILLIGRAM(S): at 05:42

## 2023-06-29 RX ADMIN — DULOXETINE HYDROCHLORIDE 60 MILLIGRAM(S): 30 CAPSULE, DELAYED RELEASE ORAL at 05:41

## 2023-06-29 NOTE — PROGRESS NOTE ADULT - ASSESSMENT
Dysphagia  IBS  Diarrhea    Speech and swallow eval.  noted  diet per SLP recommendations   Xray Esophagram with dysmotility; diet modification d/w pt  started on diltiazem QID for esophageal spasms   PPI BID  GI PCR negative  imodium prn diarrhea   aspiration precautions  cardiology f/u  will follow   d/w pt     I reviewed the overnight course of events on the unit, re-confirming the patient history. I discussed the care with the patient and their family  The plan of care was discussed with the physician assistant and modifications were made to the notation where appropriate.   Differential diagnosis and plan of care discussed with patient after the evaluation  35 minutes spent on total encounter of which more than fifty percent of the encounter was spent counseling and/or coordinating care by the attending physician.  Advanced care planning was discussed with patient and family.  Advanced care planning forms were reviewed and discussed.  Risks, benefits and alternatives of gastroenterologic procedures were discussed in detail and all questions were answered.    Dysphagia  IBS  Diarrhea    Speech and swallow eval.  noted  diet per SLP recommendations   Xray Esophagram with dysmotility; diet modification d/w pt  started on diltiazem QID for esophageal spasms   may need outpatient manometry testing with Dr. Homa Taylor   PPI BID  GI PCR negative  imodium prn diarrhea   aspiration precautions  cardiology f/u  will follow   d/w pt     I reviewed the overnight course of events on the unit, re-confirming the patient history. I discussed the care with the patient and their family  The plan of care was discussed with the physician assistant and modifications were made to the notation where appropriate.   Differential diagnosis and plan of care discussed with patient after the evaluation  35 minutes spent on total encounter of which more than fifty percent of the encounter was spent counseling and/or coordinating care by the attending physician.  Advanced care planning was discussed with patient and family.  Advanced care planning forms were reviewed and discussed.  Risks, benefits and alternatives of gastroenterologic procedures were discussed in detail and all questions were answered.

## 2023-06-29 NOTE — PROGRESS NOTE ADULT - ASSESSMENT
72 f with    Acute on Chronic Systolic Congestive Heart Failure  - telemetry  - diurese  - echo noted  - cardiology follow    Back pain  - pain control  - Xray C T spine noted    Dysphagia/ Esophageal spasms  - esophagogram noted  - CCB for esophageal spasms  - Gastroenterology follow     Cellulitis legs vs Dermatitis  - observe off antibiotic  - steroid cream   - observe    Anxiety / Depression   - control  - Psych evaluation  - Ativan 0.5 mg bid prn    Asthma   - nebs    Breast cancer in situ, left   - stable. Follow as OTP with Oncology    COPD (chronic obstructive pulmonary disease)   - nebs    Graves disease   - follow TSH    Hyperlipidemia   - stable    Hypertension   - control    Hypothyroid   - continue supplementation    Kidney cancer, primary, with metastasis from kidney to other site, left LEft sided- s/p nephrectomy only- no chemo or RT  - stable    Obesity   - Nutrition education    DVT prophylaxis  - AC     DCP in progress.     Nathan Mora MD phone [phone]

## 2023-06-30 ENCOUNTER — TRANSCRIPTION ENCOUNTER (OUTPATIENT)
Age: 73
End: 2023-06-30

## 2023-06-30 LAB
ALBUMIN SERPL ELPH-MCNC: 3.5 G/DL — SIGNIFICANT CHANGE UP (ref 3.3–5)
ALP SERPL-CCNC: 79 U/L — SIGNIFICANT CHANGE UP (ref 40–120)
ALT FLD-CCNC: 13 U/L — SIGNIFICANT CHANGE UP (ref 10–45)
ANION GAP SERPL CALC-SCNC: 9 MMOL/L — SIGNIFICANT CHANGE UP (ref 5–17)
AST SERPL-CCNC: 12 U/L — SIGNIFICANT CHANGE UP (ref 10–40)
BILIRUB SERPL-MCNC: 0.9 MG/DL — SIGNIFICANT CHANGE UP (ref 0.2–1.2)
BUN SERPL-MCNC: 16 MG/DL — SIGNIFICANT CHANGE UP (ref 7–23)
CALCIUM SERPL-MCNC: 9.8 MG/DL — SIGNIFICANT CHANGE UP (ref 8.4–10.5)
CHLORIDE SERPL-SCNC: 94 MMOL/L — LOW (ref 96–108)
CO2 SERPL-SCNC: 30 MMOL/L — SIGNIFICANT CHANGE UP (ref 22–31)
CREAT SERPL-MCNC: 1.52 MG/DL — HIGH (ref 0.5–1.3)
EGFR: 36 ML/MIN/1.73M2 — LOW
GLUCOSE SERPL-MCNC: 151 MG/DL — HIGH (ref 70–99)
POTASSIUM SERPL-MCNC: 3.7 MMOL/L — SIGNIFICANT CHANGE UP (ref 3.5–5.3)
POTASSIUM SERPL-SCNC: 3.7 MMOL/L — SIGNIFICANT CHANGE UP (ref 3.5–5.3)
PROT SERPL-MCNC: 6.3 G/DL — SIGNIFICANT CHANGE UP (ref 6–8.3)
SODIUM SERPL-SCNC: 133 MMOL/L — LOW (ref 135–145)

## 2023-06-30 RX ORDER — ASPIRIN/CALCIUM CARB/MAGNESIUM 324 MG
81 TABLET ORAL DAILY
Refills: 0 | Status: DISCONTINUED | OUTPATIENT
Start: 2023-06-30 | End: 2023-07-05

## 2023-06-30 RX ADMIN — DULOXETINE HYDROCHLORIDE 60 MILLIGRAM(S): 30 CAPSULE, DELAYED RELEASE ORAL at 17:47

## 2023-06-30 RX ADMIN — Medication 125 MICROGRAM(S): at 05:49

## 2023-06-30 RX ADMIN — GABAPENTIN 300 MILLIGRAM(S): 400 CAPSULE ORAL at 17:47

## 2023-06-30 RX ADMIN — Medication 1 GRAM(S): at 05:48

## 2023-06-30 RX ADMIN — TIOTROPIUM BROMIDE 2 PUFF(S): 18 CAPSULE ORAL; RESPIRATORY (INHALATION) at 12:12

## 2023-06-30 RX ADMIN — Medication 60 MILLIGRAM(S): at 05:50

## 2023-06-30 RX ADMIN — Medication 10 MILLIGRAM(S): at 17:48

## 2023-06-30 RX ADMIN — MONTELUKAST 10 MILLIGRAM(S): 4 TABLET, CHEWABLE ORAL at 12:12

## 2023-06-30 RX ADMIN — Medication 400 MILLIGRAM(S): at 16:04

## 2023-06-30 RX ADMIN — Medication 0.5 MILLIGRAM(S): at 07:53

## 2023-06-30 RX ADMIN — PANTOPRAZOLE SODIUM 40 MILLIGRAM(S): 20 TABLET, DELAYED RELEASE ORAL at 05:55

## 2023-06-30 RX ADMIN — Medication 1 GRAM(S): at 23:47

## 2023-06-30 RX ADMIN — PANTOPRAZOLE SODIUM 40 MILLIGRAM(S): 20 TABLET, DELAYED RELEASE ORAL at 17:48

## 2023-06-30 RX ADMIN — Medication 0.5 MILLIGRAM(S): at 22:53

## 2023-06-30 RX ADMIN — Medication 40 MILLIGRAM(S): at 05:49

## 2023-06-30 RX ADMIN — Medication 1 GRAM(S): at 17:46

## 2023-06-30 RX ADMIN — GABAPENTIN 300 MILLIGRAM(S): 400 CAPSULE ORAL at 05:50

## 2023-06-30 RX ADMIN — LOSARTAN POTASSIUM 100 MILLIGRAM(S): 100 TABLET, FILM COATED ORAL at 05:48

## 2023-06-30 RX ADMIN — Medication 60 MILLIGRAM(S): at 23:47

## 2023-06-30 RX ADMIN — Medication 60 MILLIGRAM(S): at 12:12

## 2023-06-30 RX ADMIN — Medication 10 MILLIGRAM(S): at 05:48

## 2023-06-30 RX ADMIN — Medication 81 MILLIGRAM(S): at 16:04

## 2023-06-30 RX ADMIN — ENOXAPARIN SODIUM 40 MILLIGRAM(S): 100 INJECTION SUBCUTANEOUS at 22:16

## 2023-06-30 RX ADMIN — AMLODIPINE BESYLATE 5 MILLIGRAM(S): 2.5 TABLET ORAL at 05:51

## 2023-06-30 RX ADMIN — DULOXETINE HYDROCHLORIDE 60 MILLIGRAM(S): 30 CAPSULE, DELAYED RELEASE ORAL at 05:51

## 2023-06-30 RX ADMIN — ATORVASTATIN CALCIUM 40 MILLIGRAM(S): 80 TABLET, FILM COATED ORAL at 22:16

## 2023-06-30 RX ADMIN — Medication 1 GRAM(S): at 12:13

## 2023-06-30 RX ADMIN — Medication 75 MILLIGRAM(S): at 05:48

## 2023-06-30 RX ADMIN — Medication 75 MILLIGRAM(S): at 17:48

## 2023-06-30 RX ADMIN — OLANZAPINE 10 MILLIGRAM(S): 15 TABLET, FILM COATED ORAL at 12:12

## 2023-06-30 RX ADMIN — Medication 60 MILLIGRAM(S): at 17:46

## 2023-06-30 NOTE — DISCHARGE NOTE PROVIDER - NSDCFUSCHEDAPPT_GEN_ALL_CORE_FT
Dane Salguero  [facility]  GASTRO [id number] [facility]  Scheduled Appointment: 07/17/2023

## 2023-06-30 NOTE — DISCHARGE NOTE PROVIDER - NSDCFUADDAPPT_GEN_ALL_CORE_FT
APPTS ARE READY TO BE MADE: [ ] YES    Best Family or Patient Contact (if needed):    Additional Information about above appointments (if needed):    1:   2:   3:     Other comments or requests:    APPTS ARE READY TO BE MADE: [x ] YES    Best Family or Patient Contact (if needed):    Additional Information about above appointments (if needed):    1:   2:   3:     Other comments or requests:    APPTS ARE READY TO BE MADE: [x ] YES    Best Family or Patient Contact (if needed):    Additional Information about above appointments (if needed):    1:   2:   3:     Other comments or requests:     Patient was previously scheduled to see Dr. Salguero at 1pm on 7/17 at [address] [place] [address] APPTS ARE READY TO BE MADE: [x ] YES    Best Family or Patient Contact (if needed):    Additional Information about above appointments (if needed):    1:   2:   3:     Other comments or requests:     Patient was previously scheduled to see Dr. Salguero at 1pm on 7/17 at [address] [place] [address]    Patient/Caregiver was provided with follow up request details and prefers to call the providers office on their own to schedule.

## 2023-06-30 NOTE — DISCHARGE NOTE PROVIDER - NSDCCPCAREPLAN_GEN_ALL_CORE_FT
PRINCIPAL DISCHARGE DIAGNOSIS  Diagnosis: Chest pain  Assessment and Plan of Treatment: resolved , cardiac workup negative , had an echocardiogram show TTE mild diastolic dysfunction, normal  valves. Please follow up with Dr Johns . cta chest without evidence of aortic dissection/PE  Diagnosed with esophageal spasms .   HOME CARE INSTRUCTIONS  For the next few days, avoid physical activities that bring on chest pain. Continue physical activities as directed.  Do not smoke.  Avoid drinking alcohol.   Follow your caregiver's suggestions for further testing if your chest pain does not go away.  Keep any follow-up appointments you made. If you do not go to an appointment, you could develop lasting (chronic) problems with pain. If there is any problem keeping an appointment, you must call to reschedule.   SEEK MEDICAL CARE IF:  You think you are having problems from the medicine you are taking. Read your medicine instructions carefully.  Your chest pain does not go away, even after treatment.  You develop a rash with blisters on your chest.  SEEK IMMEDIATE MEDICAL CARE IF:  You have increased chest pain or pain that spreads to your arm, neck, jaw, back, or abdomen.   You develop shortness of breath, an increasing cough, or you are coughing up blood.  You have severe back or abdominal pain, feel nauseous, or vomit.  You develop severe weakness, fainting, or chills.  You have a fever.  THIS IS AN EMERGENCY. Do not wait to see if the pain will go away. Get medical help at once. Call your local emergency services (_____________________). Do not drive yourself to the hospital.        SECONDARY DISCHARGE DIAGNOSES  Diagnosis: Hypertension  Assessment and Plan of Treatment: Follow up with your medical doctor to establish long term blood pressure treatment goals.      Diagnosis: Hypothyroid  Assessment and Plan of Treatment: c/w synthyroid.    Diagnosis: Anxiety and depression  Assessment and Plan of Treatment: c/w buspar and cymbalta, psych follow up outpatient for ongoing care.    Diagnosis: Esophageal spasm  Assessment and Plan of Treatment: Xray Esophagram with dysmotility; diet modification and importance of soft food d/w pt  started on diltiazem QID for esophageal spasms   will need outpatient manometry testing with Dr. Homa Taylor   PPI BID  GI PCR negative  imodium prn diarrhea   aspiration precautions     PRINCIPAL DISCHARGE DIAGNOSIS  Diagnosis: Chest pain  Assessment and Plan of Treatment: resolved , cardiac workup negative , had an echocardiogram show TTE mild diastolic dysfunction, normal  valves. Please follow up with Dr Johns . cta chest without evidence of aortic dissection/PE  Diagnosed with esophageal spasms .   HOME CARE INSTRUCTIONS  For the next few days, avoid physical activities that bring on chest pain. Continue physical activities as directed.  Do not smoke.  Avoid drinking alcohol.   Follow your caregiver's suggestions for further testing if your chest pain does not go away.  Keep any follow-up appointments you made. If you do not go to an appointment, you could develop lasting (chronic) problems with pain. If there is any problem keeping an appointment, you must call to reschedule.   SEEK MEDICAL CARE IF:  You think you are having problems from the medicine you are taking. Read your medicine instructions carefully.  Your chest pain does not go away, even after treatment.  You develop a rash with blisters on your chest.  SEEK IMMEDIATE MEDICAL CARE IF:  You have increased chest pain or pain that spreads to your arm, neck, jaw, back, or abdomen.   You develop shortness of breath, an increasing cough, or you are coughing up blood.  You have severe back or abdominal pain, feel nauseous, or vomit.  You develop severe weakness, fainting, or chills.  You have a fever.  THIS IS AN EMERGENCY. Do not wait to see if the pain will go away. Get medical help at once. Call your local emergency services (_____________________). Do not drive yourself to the hospital.        SECONDARY DISCHARGE DIAGNOSES  Diagnosis: Esophageal spasm  Assessment and Plan of Treatment: Xray Esophagram with dysmotility; diet modification and importance of soft food d/w pt  started on diltiazem QID for esophageal spasms   will need outpatient manometry testing with Dr. Homa Taylor   PPI BID  GI PCR negative  imodium prn diarrhea   aspiration precautions    Diagnosis: Anxiety and depression  Assessment and Plan of Treatment: c/w buspar and cymbalta, psych follow up outpatient for ongoing care.    Diagnosis: Hypothyroid  Assessment and Plan of Treatment: c/w synthyroid.    Diagnosis: Hypertension  Assessment and Plan of Treatment: Follow up with your medical doctor to establish long term blood pressure treatment goals.

## 2023-06-30 NOTE — DISCHARGE NOTE PROVIDER - CARE PROVIDER_API CALL
Homa Taylor  Gastroenterology  [address] [place] [address] [address]  [place] [address]  Phone: [phone]  Fax: [phone]  Follow Up Time: 1 week    Juan Salcedo  Cardiovascular Disease  [address] [address]  Phone: [phone]  Fax: [phone]  Follow Up Time: 1 week

## 2023-06-30 NOTE — ADVANCED PRACTICE NURSE CONSULT - ASSESSMENT
When wound care RN arrived on unit, patient was found lying in a low air loss pressure redistribution support surface style bed while talking on the telephone. Patient was alert and oriented and gave consent to skin consult. Ms To is unable to turn independently and staff assistance x 1 was provided. Patient is incontinent of urine and stool, pericare provided. Once cleaned, wound care RN was able to visualize an area of persistent nonblanchable purple hued discoloration over B/L buttocks/sacral skin extending toward B/L ischium, area noted to measure approximately 40cm x 30cm x 0cm including B/L ischium. Once consult was complete, patient was educated regarding the need for routine turning and positioning to prevent pressure injuries. Patient declines a side-lying position and states that she cannot lie on her side "at all." She was placed in an upright fashion at her request.

## 2023-06-30 NOTE — PROGRESS NOTE ADULT - ASSESSMENT
Dysphagia  IBS  Diarrhea    Speech and swallow eval.  noted  diet per SLP recommendations   Xray Esophagram with dysmotility; diet modification and importance of soft food d/w pt  started on diltiazem QID for esophageal spasms   will need outpatient manometry testing with Dr. Homa Taylor   PPI BID  GI PCR negative  imodium prn diarrhea   aspiration precautions  cardiology f/u  d/w pt     I reviewed the overnight course of events on the unit, re-confirming the patient history. I discussed the care with the patient and their family  The plan of care was discussed with the physician assistant and modifications were made to the notation where appropriate.   Differential diagnosis and plan of care discussed with patient after the evaluation  35 minutes spent on total encounter of which more than fifty percent of the encounter was spent counseling and/or coordinating care by the attending physician.  Advanced care planning was discussed with patient and family.  Advanced care planning forms were reviewed and discussed.  Risks, benefits and alternatives of gastroenterologic procedures were discussed in detail and all questions were answered.

## 2023-06-30 NOTE — PROGRESS NOTE ADULT - ASSESSMENT
72 f with    Acute on Chronic Systolic Congestive Heart Failure  - telemetry  - diurese  - echo noted  - cardiology follow    Back pain  - pain control  - Xray C T spine noted    Dysphagia/ Esophageal spasms  - esophagogram noted  - CCB for esophageal spasms  - Gastroenterology follow     Cellulitis legs vs Dermatitis  - observe off antibiotic  - steroid cream   - observe    Anxiety / Depression   - control  - Psych evaluation  - Ativan 0.5 mg bid prn    Asthma   - nebs    Breast cancer in situ, left   - stable. Follow as OTP with Oncology    COPD (chronic obstructive pulmonary disease)   - nebs    Graves disease   - follow TSH    Hyperlipidemia   - stable    Hypertension   - control    Hypothyroid   - continue supplementation    Kidney cancer, primary, with metastasis from kidney to other site, left LEft sided- s/p nephrectomy only- no chemo or RT  - stable    Obesity   - Nutrition education    DVT prophylaxis  - AC     DCP in progress.     Nathan Mora MD phone [phone]

## 2023-06-30 NOTE — ADVANCED PRACTICE NURSE CONSULT - REASON FOR CONSULT
Wound care consult initiated by RN to assess patient's skin for a possible skin injury on B/L buttocks/posterior thighs    History of Present Illness:   72-year-old female history of COPD on 2 L nasal cannula at home, Graves' disease, HLD, HTN, breast cancer, kidney cancer, skin cancer all in remission presenting with a chief complaint of chest pain x2 months.  Patient states that pain radiates to her back and her mouth feels dry. Patient is also having difficulty swallowing. Patient states that she feels like she is having a choking sensation.  Patient describes symptoms as a tightness.  Patient states that she is coming in today because the symptoms are progressively getting worse.  Patient at baseline is not able to ambulate and does not know if movement makes symptoms worse.  Patient reports shortness of breath as well.  The patient's  is at bedside and reports that the patient also has had leg swelling.   reports that patient has had cellulitis in the past.  And he has noticed that there is increased redness of the leg.  Patient denies any recent illnesses, nausea, vomiting any headaches, visual changes, diarrhea, urinary symptoms.

## 2023-06-30 NOTE — ADVANCED PRACTICE NURSE CONSULT - RECOMMEDATIONS
Impression:    urinary incontinence  fecal incontinence  B/L buttocks/sacral deep tissue injury  B/L ischium deep tissue injury    Recommendations:    1) continue turning and positioning q2 and PRN utilizing offloading assistive devices  2) continue with routine pericare daily and PRN soiling  3) encourage optimal nutrition  4) waffle cushion when oob to chair  5) B/L LE complete cair air fluidized boots to offload heels/feet  6) triad protective barrier cream to B/L buttocks/sacrum daily and PRN soiling  7) incontinence management - continue external female urinary catheter to divert urine from skin if incontinent    Plan discussed with ADDI Schrader at bedside

## 2023-06-30 NOTE — DISCHARGE NOTE PROVIDER - HOSPITAL COURSE
72yoF PMHx COPD on 2LNC at home, Graves' disease, HLD, HTN, breast cancer, kidney cancer, skin cancer all in remission p/w chest pain x2 months. Patient states that pain radiates to her back and her mouth feels dry. Patient is also having difficulty swallowing. Patient states that she feels like she is having a choking sensation. Patient describes symptoms as a tightness. Patient states that she is coming in today because the symptoms are progressively getting worse. Patient at baseline is not able to ambulate and does not know if movement makes symptoms worse. Patient reports shortness of breath as well.  The patient's  is at bedside and reports that the patient also has had leg swelling.   reports that patient has had cellulitis in the past.  And he has noticed that there is increased redness of the leg. Patient with AoC sHF- IV Lasix QD--PO; CXR w/ pulmonary edema/small b/l effusions; on 2LNC, Patient complained of Dysphagia- Xray Esophagram w/ dysmotility, currently on soft & bite sized diet  Patient with LE Cellulitis vs Dermatitis - monitor off abx; LLE duplex negative. Mr To had a RRT on 6/27 2/2 esphageal spasms, started on Diltiazem q 6hrs ; as per gastroenterology Xray Esophagram with dysmotility; diet modification and importance of soft food d/w ptstarted on diltiazem QID for esophageal spasms will need outpatient manometry testing with Dr. Homa Taylor. Continue with PPI BID. Aspiration precautions. Patient medically cleared for discharge and outpatient follow up with GI   72yoF PMHx COPD on 2LNC at home, Graves' disease, HLD, HTN, breast cancer, kidney cancer, skin cancer all in remission p/w chest pain x2 months. Patient states that pain radiates to her back and her mouth feels dry. Patient is also having difficulty swallowing. Patient states that she feels like she is having a choking sensation. Patient describes symptoms as a tightness. Patient states that she is coming in today because the symptoms are progressively getting worse. Patient at baseline is not able to ambulate and does not know if movement makes symptoms worse. Patient reports shortness of breath as well.  The patient's  is at bedside and reports that the patient also has had leg swelling.   reports that patient has had cellulitis in the past.  And he has noticed that there is increased redness of the leg. Patient with AoC sHF- IV Lasix QD--PO; CXR w/ pulmonary edema/small b/l effusions; on 2LNC, Patient complained of Dysphagia- Xray Esophagram w/ dysmotility, currently on soft & bite sized diet  Patient with LE Cellulitis vs Dermatitis - monitor off abx; LLE duplex negative. Mr To had a RRT on 6/27 2/2 esophageal spasms, started on Diltiazem q 6hrs ; as per gastroenterology Xray Esophagram with dysmotility; diet modification and importance of soft food d/w pt started on diltiazem QID for esophageal spasms will need outpatient manometry testing with Dr. Homa Taylor. Continue with PPI BID. Aspiration precautions. Patient medically cleared for discharge and outpatient follow up with GI

## 2023-06-30 NOTE — CHART NOTE - NSCHARTNOTEFT_GEN_A_CORE
72-year-old female history of COPD on 2 L nasal cannula at home, Graves' disease, HLD, HTN, breast cancer, kidney cancer, skin cancer all in remission presenting with a chief complaint of chest pain x2 months.  Patient states that pain radiates to her back and her mouth feels dry. Patient is also having difficulty swallowing. Patient states that she feels like she is having a choking sensation.  Patient describes symptoms as a tightness.  Patient states that she is coming in today because the symptoms are progressively getting worse.  Patient at baseline is not able to ambulate and does not know if movement makes symptoms worse.  Patient reports shortness of breath as well.  The patient's  at bedside reported that the patient has had leg swelling, cellulitis in the past, and has noticed that there is increased redness of the leg. Patient denies any recent illnesses, nausea, vomiting any headaches, visual changes, diarrhea, urinary symptoms.    Continued Hx course:  6/24 Chart note: Patient c/o of anxiety  6/24 ED ADULT: ACP contacted regarding pt diet status. Pt passed dysphagia screen.  Chest Xray (6/24) IMPRESSION: Mild pulmonary venous hypertension/pulmonary edema with small bilateral pleural effusions.  CT Chest (6/24) IMPRESSION: Emphysema. Indeterminate 2.8 cm hypodense lesion in the spleen.  Cardiology consulted (6/26) cardiac stable.   GI consulted (6/26): Patient completed esophagram. The esophagus demonstrates normal distensibility and course. However, extensive tertiary contractions are seen throughout the esophagus. There is extensive esophageal reflux. There is no abnormal extrinsic mass effect. Contrast passes freely into the stomach. There is no definite hiatal hernia.  RRT 6/27: As per Telemetry, patient had 10 beats Wide complex  and VTach Episode. Patient stable s/p RRT.    Dysphagia hx: Patient seen by this service for bedside swallow evaluation on 6/27. Patient recommended for diet of soft & bite sized/thin liquids as per discussion with GI PA Isabella Mott.    Today (6/30): Patient seen by this service for diet monitor. Of note, patient continues to c/o chest pain and concerns of choking. Discussed recommendations and patient presentation with ADDI Schrader and DEMARCO Sifuentes. Patient received seated upright in bed, A&Ox4, on 3L NC, with  present in the room. Patient had lunch tray present and tolerated trials of puree and thin liquid consistencies without overt s/s of aspiration or penetration. Patient and caregiver educated re: dysphagia diet recommendations and aspiration precautions.     IMPRESSIONS:  1.  From an toshia-pharyngeal standpoint patient may continue oral diet. Suspect primarily GI etiologies for dysphagia symptoms. SLP intervention no longer indicated. DEMARCO Sifuentes cleared SLP to sign off.     RECOMMENDATIONS:  1. Soft & bite size/ thin liquid diet.   2.Monitor for s/s aspiration/laryngeal penetration. If noted:  D/C p.o. intake, provide non-oral nutrition/hydration/meds, and contact this service @ [phone]       D/W ADDI Schrader and DEMARCO Sifuentes.    Speech Language Pathology   Saira Sanford MA CCC-SLP  Teams preferred; [phone] 72-year-old female history of COPD on 2 L nasal cannula at home, Graves' disease, HLD, HTN, breast cancer, kidney cancer, skin cancer all in remission presenting with a chief complaint of chest pain x2 months.  Patient states that pain radiates to her back and her mouth feels dry. Patient is also having difficulty swallowing. Patient states that she feels like she is having a choking sensation.  Patient describes symptoms as a tightness.  Patient states that she is coming in today because the symptoms are progressively getting worse.  Patient at baseline is not able to ambulate and does not know if movement makes symptoms worse.  Patient reports shortness of breath as well.  The patient's  at bedside reported that the patient has had leg swelling, cellulitis in the past, and has noticed that there is increased redness of the leg. Patient denies any recent illnesses, nausea, vomiting any headaches, visual changes, diarrhea, urinary symptoms.    Continued Hx course:  6/24 Chart note: Patient c/o of anxiety  6/24 ED ADULT: ACP contacted regarding pt diet status. Pt passed dysphagia screen.  Chest Xray (6/24) IMPRESSION: Mild pulmonary venous hypertension/pulmonary edema with small bilateral pleural effusions.  CT Chest (6/24) IMPRESSION: Emphysema. Indeterminate 2.8 cm hypodense lesion in the spleen.  Cardiology consulted (6/26) cardiac stable.   GI consulted (6/26): Patient completed esophagram. The esophagus demonstrates normal distensibility and course. However, extensive tertiary contractions are seen throughout the esophagus. There is extensive esophageal reflux. There is no abnormal extrinsic mass effect. Contrast passes freely into the stomach. There is no definite hiatal hernia.  RRT 6/27: As per Telemetry, patient had 10 beats Wide complex  and VTach Episode. Patient stable s/p RRT.    Dysphagia hx: Patient seen by this service for bedside swallow evaluation on 6/27. Patient recommended for diet of soft & bite sized/thin liquids as per discussion with GI PA Isabella Mott.    Today (6/30): Patient seen by this service for diet monitor. Patient received seated upright in bed, A&Ox4, on 3L NC, with  present in the room. Patient had lunch tray present and tolerated trials of puree and thin liquid consistencies without overt s/s of aspiration or penetration. Patient refusal of soft & bite sized textures. Of note, patient continues to c/o chest pain and concerns of choking. Patient and caregiver education provided re: diet recommendation and safe swallow guidelines. Consider diet modification to puree/thin liquids given ongoing patient concern re: choking sensation. Discussed recommendations and patient presentation with ADDI Schrader and DEMARCO Sifuentes.     IMPRESSIONS:  1.  From an toshia-pharyngeal standpoint patient cleared to continue on recommended oral diet. Suspect primarily GI etiologies for dysphagia symptoms. This service to continue to monitor given patient concerns re: choking. NP Maria aware.     RECOMMENDATIONS:  1. Soft & bite size/ thin liquid diet; Consider diet modification to puree/thin liquids d/t patient concerns re: choking.   2. Monitor for s/s aspiration/laryngeal penetration. If noted:  D/C p.o. intake, provide non-oral nutrition/hydration/meds, and contact this service @ [phone]       D/W ADDI Schrader and DEMARCO Sifuentes.    Speech Language Pathology   Saira Sanford MA CCC-SLP  Teams preferred; [phone]

## 2023-06-30 NOTE — DISCHARGE NOTE PROVIDER - DETAILS OF MALNUTRITION DIAGNOSIS/DIAGNOSES
This patient has been assessed with a concern for Malnutrition and was treated during this hospitalization for the following Nutrition diagnosis/diagnoses:     -  07/05/2023: Morbid obesity (BMI > 40)

## 2023-06-30 NOTE — DISCHARGE NOTE PROVIDER - NSDCMRMEDTOKEN_GEN_ALL_CORE_FT
Albuterol (Eqv-ProAir HFA) 90 mcg/inh inhalation aerosol: 2 puff(s) inhaled every 6 hours -for chest pain   amLODIPine 5 mg oral tablet: 1 tab(s) orally once a day  aspirin 81 mg oral delayed release tablet: 1 tab(s) orally once a day  atorvastatin 40 mg oral tablet: 1 tab(s) orally once a day  busPIRone 10 mg oral tablet: 2 tab(s) orally once a day  DULoxetine 60 mg oral delayed release capsule: 1 cap(s) orally 2 times a day  furosemide 40 mg oral tablet: 1 tab(s) orally once a day  gabapentin 300 mg oral capsule: 1 capsule at noon and 1 capsule in the evening  hydrALAZINE 25 mg oral tablet: 3 tab(s) orally 2 times a day  levothyroxine 125 mcg (0.125 mg) oral tablet: 1 tab(s) orally once a day  losartan 100 mg oral tablet: 1 tab(s) orally once a day  melatonin 3 mg oral tablet: 1 tab(s) orally once a day (at bedtime)  montelukast 10 mg oral tablet: 1 tab(s) orally once a day  OLANZapine 10 mg oral tablet: 1 tab(s) orally once a day  pentoxifylline 400 mg oral tablet, extended release: 1 tab(s) orally once a day  predniSONE 5 mg oral tablet: 1 tab(s) orally once a day  propranolol 20 mg oral tablet: 1 tab(s) orally 2 times a day  Spiriva HandiHaler 18 mcg inhalation capsule: 1 cap(s) inhaled once a day   Albuterol (Eqv-ProAir HFA) 90 mcg/inh inhalation aerosol: 2 puff(s) inhaled every 6 hours -for chest pain   amLODIPine 5 mg oral tablet: 1 tab(s) orally once a day  atorvastatin 40 mg oral tablet: 1 tab(s) orally once a day  busPIRone 10 mg oral tablet: 2 tab(s) orally once a day  DULoxetine 60 mg oral delayed release capsule: 1 cap(s) orally 2 times a day  furosemide 40 mg oral tablet: 1 tab(s) orally once a day  gabapentin 300 mg oral capsule: 1 capsule at noon and 1 capsule in the evening  hydrALAZINE 25 mg oral tablet: 3 tab(s) orally 2 times a day  levothyroxine 125 mcg (0.125 mg) oral tablet: 1 tab(s) orally once a day  losartan 100 mg oral tablet: 1 tab(s) orally once a day  melatonin 3 mg oral tablet: 1 tab(s) orally once a day (at bedtime)  montelukast 10 mg oral tablet: 1 tab(s) orally once a day  OLANZapine 10 mg oral tablet: 1 tab(s) orally once a day  pentoxifylline 400 mg oral tablet, extended release: 1 tab(s) orally once a day  propranolol 20 mg oral tablet: 1 tab(s) orally 2 times a day  Spiriva HandiHaler 18 mcg inhalation capsule: 1 cap(s) inhaled once a day   Albuterol (Eqv-ProAir HFA) 90 mcg/inh inhalation aerosol: 2 puff(s) inhaled every 6 hours -for chest pain   amitriptyline 10 mg oral tablet: 1 tab(s) orally once a day (at bedtime)  amLODIPine 5 mg oral tablet: 1 tab(s) orally once a day  atorvastatin 40 mg oral tablet: 1 tab(s) orally once a day  busPIRone 10 mg oral tablet: 2 tab(s) orally once a day  dilTIAZem 60 mg oral tablet: 1 tab(s) orally every 6 hours  DULoxetine 60 mg oral delayed release capsule: 1 cap(s) orally 2 times a day  furosemide 40 mg oral tablet: 1 tab(s) orally once a day  gabapentin 300 mg oral capsule: 1 capsule at noon and 1 capsule in the evening  hydrALAZINE 25 mg oral tablet: 3 tab(s) orally 2 times a day  levothyroxine 125 mcg (0.125 mg) oral tablet: 1 tab(s) orally once a day  losartan 100 mg oral tablet: 1 tab(s) orally once a day  melatonin 3 mg oral tablet: 1 tab(s) orally once a day (at bedtime)  montelukast 10 mg oral tablet: 1 tab(s) orally once a day  Multiple Vitamins oral tablet: 1 tab(s) orally once a day  OLANZapine 10 mg oral tablet: 1 tab(s) orally once a day  pentoxifylline 400 mg oral tablet, extended release: 1 tab(s) orally once a day  propranolol 20 mg oral tablet: 1 tab(s) orally 2 times a day  Spiriva HandiHaler 18 mcg inhalation capsule: 1 cap(s) inhaled once a day  sucralfate 1 g oral tablet: 1 tab(s) orally 4 times a day   Albuterol (Eqv-ProAir HFA) 90 mcg/inh inhalation aerosol: 2 puff(s) inhaled every 6 hours -for chest pain   amitriptyline 10 mg oral tablet: 1 tab(s) orally once a day (at bedtime)  amLODIPine 5 mg oral tablet: 1 tab(s) orally once a day  Ativan 0.5 mg oral tablet: 1 tab(s) orally 2 times a day MDD: 2 tabs  atorvastatin 40 mg oral tablet: 1 tab(s) orally once a day  busPIRone 10 mg oral tablet: 2 tab(s) orally once a day  dilTIAZem 60 mg oral tablet: 1 tab(s) orally every 6 hours  DULoxetine 60 mg oral delayed release capsule: 1 cap(s) orally 2 times a day  furosemide 40 mg oral tablet: 1 tab(s) orally once a day  gabapentin 300 mg oral capsule: 1 capsule at noon and 1 capsule in the evening  hydrALAZINE 25 mg oral tablet: 3 tab(s) orally 2 times a day  levothyroxine 125 mcg (0.125 mg) oral tablet: 1 tab(s) orally once a day  losartan 100 mg oral tablet: 1 tab(s) orally once a day  melatonin 3 mg oral tablet: 1 tab(s) orally once a day (at bedtime)  montelukast 10 mg oral tablet: 1 tab(s) orally once a day  Multiple Vitamins oral tablet: 1 tab(s) orally once a day  OLANZapine 10 mg oral tablet: 1 tab(s) orally once a day  pentoxifylline 400 mg oral tablet, extended release: 1 tab(s) orally once a day  propranolol 20 mg oral tablet: 1 tab(s) orally 2 times a day  Spiriva HandiHaler 18 mcg inhalation capsule: 1 cap(s) inhaled once a day  sucralfate 1 g oral tablet: 1 tab(s) orally 4 times a day   Albuterol (Eqv-ProAir HFA) 90 mcg/inh inhalation aerosol: 2 puff(s) inhaled every 6 hours -for chest pain   amitriptyline 10 mg oral tablet: 1 tab(s) orally once a day (at bedtime)  amLODIPine 5 mg oral tablet: 1 tab(s) orally once a day  Ativan 0.5 mg oral tablet: 1 tab(s) orally 2 times a day MDD: 2 tabs  atorvastatin 40 mg oral tablet: 1 tab(s) orally once a day  busPIRone 10 mg oral tablet: 2 tab(s) orally once a day  dilTIAZem 60 mg oral tablet: 1 tab(s) orally every 6 hours  DULoxetine 60 mg oral delayed release capsule: 1 cap(s) orally 2 times a day  furosemide 40 mg oral tablet: 1 tab(s) orally once a day  gabapentin 300 mg oral capsule: 1 capsule at noon and 1 capsule in the evening  hydrALAZINE 25 mg oral tablet: 3 tab(s) orally 2 times a day  levothyroxine 125 mcg (0.125 mg) oral tablet: 1 tab(s) orally once a day  losartan 100 mg oral tablet: 1 tab(s) orally once a day  melatonin 3 mg oral tablet: 1 tab(s) orally once a day (at bedtime)  montelukast 10 mg oral tablet: 1 tab(s) orally once a day  Multiple Vitamins oral tablet: 1 tab(s) orally once a day  OLANZapine 10 mg oral tablet: 1 tab(s) orally once a day  pentoxifylline 400 mg oral tablet, extended release: 1 tab(s) orally once a day  Percocet 5 mg-325 mg oral tablet: 1 tab(s) orally every 8 hours MDD: 3 tabs  propranolol 20 mg oral tablet: 1 tab(s) orally 2 times a day  Spiriva HandiHaler 18 mcg inhalation capsule: 1 cap(s) inhaled once a day  sucralfate 1 g oral tablet: 1 tab(s) orally 4 times a day

## 2023-06-30 NOTE — DISCHARGE NOTE PROVIDER - PROVIDER TOKENS
PROVIDER:[TOKEN:[[id number]],FOLLOWUP:[1 week]],PROVIDER:[TOKEN:[[id number]],FOLLOWUP:[1 week]]

## 2023-07-01 LAB
ANION GAP SERPL CALC-SCNC: 14 MMOL/L — SIGNIFICANT CHANGE UP (ref 5–17)
BUN SERPL-MCNC: 18 MG/DL — SIGNIFICANT CHANGE UP (ref 7–23)
CALCIUM SERPL-MCNC: 10.1 MG/DL — SIGNIFICANT CHANGE UP (ref 8.4–10.5)
CHLORIDE SERPL-SCNC: 92 MMOL/L — LOW (ref 96–108)
CO2 SERPL-SCNC: 29 MMOL/L — SIGNIFICANT CHANGE UP (ref 22–31)
CREAT SERPL-MCNC: 1.27 MG/DL — SIGNIFICANT CHANGE UP (ref 0.5–1.3)
EGFR: 44 ML/MIN/1.73M2 — LOW
GLUCOSE SERPL-MCNC: 112 MG/DL — HIGH (ref 70–99)
MAGNESIUM SERPL-MCNC: 1.6 MG/DL — SIGNIFICANT CHANGE UP (ref 1.6–2.6)
POTASSIUM SERPL-MCNC: 3.7 MMOL/L — SIGNIFICANT CHANGE UP (ref 3.5–5.3)
POTASSIUM SERPL-SCNC: 3.7 MMOL/L — SIGNIFICANT CHANGE UP (ref 3.5–5.3)
SODIUM SERPL-SCNC: 135 MMOL/L — SIGNIFICANT CHANGE UP (ref 135–145)

## 2023-07-01 RX ORDER — NYSTATIN CREAM 100000 [USP'U]/G
1 CREAM TOPICAL
Refills: 0 | Status: DISCONTINUED | OUTPATIENT
Start: 2023-07-01 | End: 2023-07-06

## 2023-07-01 RX ADMIN — NYSTATIN CREAM 1 APPLICATION(S): 100000 CREAM TOPICAL at 18:15

## 2023-07-01 RX ADMIN — GABAPENTIN 300 MILLIGRAM(S): 400 CAPSULE ORAL at 05:13

## 2023-07-01 RX ADMIN — Medication 10 MILLIGRAM(S): at 18:11

## 2023-07-01 RX ADMIN — Medication 125 MICROGRAM(S): at 05:13

## 2023-07-01 RX ADMIN — PANTOPRAZOLE SODIUM 40 MILLIGRAM(S): 20 TABLET, DELAYED RELEASE ORAL at 18:10

## 2023-07-01 RX ADMIN — OLANZAPINE 10 MILLIGRAM(S): 15 TABLET, FILM COATED ORAL at 12:41

## 2023-07-01 RX ADMIN — Medication 10 MILLIGRAM(S): at 05:13

## 2023-07-01 RX ADMIN — DULOXETINE HYDROCHLORIDE 60 MILLIGRAM(S): 30 CAPSULE, DELAYED RELEASE ORAL at 18:15

## 2023-07-01 RX ADMIN — AMLODIPINE BESYLATE 5 MILLIGRAM(S): 2.5 TABLET ORAL at 05:12

## 2023-07-01 RX ADMIN — TIOTROPIUM BROMIDE 2 PUFF(S): 18 CAPSULE ORAL; RESPIRATORY (INHALATION) at 12:41

## 2023-07-01 RX ADMIN — Medication 60 MILLIGRAM(S): at 13:00

## 2023-07-01 RX ADMIN — ENOXAPARIN SODIUM 40 MILLIGRAM(S): 100 INJECTION SUBCUTANEOUS at 21:32

## 2023-07-01 RX ADMIN — Medication 60 MILLIGRAM(S): at 18:25

## 2023-07-01 RX ADMIN — Medication 81 MILLIGRAM(S): at 12:40

## 2023-07-01 RX ADMIN — Medication 400 MILLIGRAM(S): at 12:41

## 2023-07-01 RX ADMIN — Medication 1 GRAM(S): at 05:13

## 2023-07-01 RX ADMIN — LOSARTAN POTASSIUM 100 MILLIGRAM(S): 100 TABLET, FILM COATED ORAL at 05:13

## 2023-07-01 RX ADMIN — Medication 0.5 MILLIGRAM(S): at 21:38

## 2023-07-01 RX ADMIN — Medication 1 GRAM(S): at 18:14

## 2023-07-01 RX ADMIN — Medication 1 GRAM(S): at 12:39

## 2023-07-01 RX ADMIN — DULOXETINE HYDROCHLORIDE 60 MILLIGRAM(S): 30 CAPSULE, DELAYED RELEASE ORAL at 05:12

## 2023-07-01 RX ADMIN — Medication 40 MILLIGRAM(S): at 05:12

## 2023-07-01 RX ADMIN — MONTELUKAST 10 MILLIGRAM(S): 4 TABLET, CHEWABLE ORAL at 12:39

## 2023-07-01 RX ADMIN — Medication 650 MILLIGRAM(S): at 13:30

## 2023-07-01 RX ADMIN — Medication 60 MILLIGRAM(S): at 05:12

## 2023-07-01 RX ADMIN — Medication 0.5 MILLIGRAM(S): at 09:03

## 2023-07-01 RX ADMIN — Medication 650 MILLIGRAM(S): at 12:40

## 2023-07-01 RX ADMIN — Medication 75 MILLIGRAM(S): at 05:12

## 2023-07-01 RX ADMIN — GABAPENTIN 300 MILLIGRAM(S): 400 CAPSULE ORAL at 18:14

## 2023-07-01 RX ADMIN — PANTOPRAZOLE SODIUM 40 MILLIGRAM(S): 20 TABLET, DELAYED RELEASE ORAL at 05:13

## 2023-07-01 RX ADMIN — ATORVASTATIN CALCIUM 40 MILLIGRAM(S): 80 TABLET, FILM COATED ORAL at 21:31

## 2023-07-01 RX ADMIN — Medication 75 MILLIGRAM(S): at 18:14

## 2023-07-01 NOTE — PROVIDER CONTACT NOTE (OTHER) - RECOMMENDATIONS
Okay to give diltiazem.
STAT ekg and stat troponin done
Rapid Response called . STAT bloods drawn and sent to Lab. EKG stat

## 2023-07-01 NOTE — PROGRESS NOTE ADULT - ASSESSMENT
Dysphagia  IBS  Diarrhea    Speech and swallow eval.  noted  diet per SLP recommendations   Xray Esophagram with dysmotility; diet modification and importance of soft food d/w pt  started on diltiazem QID for esophageal spasms   will need outpatient manometry testing with Dr. Homa Taylor   PPI BID  GI PCR negative  imodium prn diarrhea   aspiration precautions  cardiology f/u  discharge planning as per primary team, will need close outpatient GI follow up  d/w pt   d/w primary team     I reviewed the overnight course of events on the unit, re-confirming the patient history. I discussed the care with the patient and their family  The plan of care was discussed with the physician assistant and modifications were made to the notation where appropriate.   Differential diagnosis and plan of care discussed with patient after the evaluation  35 minutes spent on total encounter of which more than fifty percent of the encounter was spent counseling and/or coordinating care by the attending physician.  Advanced care planning was discussed with patient and family.  Advanced care planning forms were reviewed and discussed.  Risks, benefits and alternatives of gastroenterologic procedures were discussed in detail and all questions were answered.

## 2023-07-01 NOTE — PROGRESS NOTE ADULT - ASSESSMENT
72 f with    Acute on Chronic Systolic Congestive Heart Failure  - telemetry  - diurese  - cardiology follow    Back pain  - pain control  - Xray C T spine noted    Dysphagia/ Esophageal spasms  - esophagogram noted  - CCB for esophageal spasms  - esophageal manometry as OTP  - Gastroenterology follow     Cellulitis legs vs Dermatitis  - observe off antibiotic  - steroid cream   - observe    Anxiety / Depression   - control  - Psych evaluation  - Ativan 0.5 mg bid prn    Asthma   - nebs    Breast cancer in situ, left   - stable. Follow as OTP with Oncology    COPD (chronic obstructive pulmonary disease)   - nebs    Graves disease   - follow TSH    Hyperlipidemia   - stable    Hypertension   - control    Hypothyroid   - continue supplementation    Kidney cancer, primary, with metastasis from kidney to other site, left LEft sided- s/p nephrectomy only- no chemo or RT  - stable    Obesity   - Nutrition education    DVT prophylaxis  - AC     DCP in progress.     d/w patient and son.    Nathan Mora MD phone [phone]

## 2023-07-01 NOTE — PROVIDER CONTACT NOTE (OTHER) - ACTION/TREATMENT ORDERED:
Okay to give diltiazem.
Repeat V/S stable. EKG done and Stat bloods sent to Lab. Patient given Naprosyn 500 mg po X1 for pain with some relief. Continue monitor V/S and Telemetry.
ekg and labs done  PRN albuterol given   x1 ativan given as ordered ( see mar) w/ good relief  will Continue monitor and notify md of any changes

## 2023-07-01 NOTE — PROVIDER CONTACT NOTE (OTHER) - ASSESSMENT
MD at bedside to assess. Pt very fidgety and appears anxious on interview. Psych consult done during day. vital signs stable.  Sinus Rhythm on Telemetry. Complaining of Upper chest tightness and spasm.  Pulse ox is 99% with O2 2L NC.
Patient V/S stable. Sinus Rhythm on Telemetry. Complaining of Upper chest tightness and spasm. POX- 99% with O2 2L NC. Esophagogram done 6/26 shows Extensive Esophageal Dysmotility with Contractions.
Patient is A&Ox4, no distress, no dizziness, no SOB, VSS, see in flow sheets.

## 2023-07-01 NOTE — PROVIDER CONTACT NOTE (OTHER) - BACKGROUND
6/24 Pt. admitted with CHF Exacerbatiob/ Chest Pain , Cellulitis Bilateral lower Extremity. Cardiac Troponin negative . PMH; HTN, HLD, Anxiety, COPD, Asthma with O2 2 liters via nasal cannulla.
Admitted for chest pain.
Pt. admitted w/ CHF Exacerbation/ Chest Pain , Cellulitis Bilateral lower Extremity. Cardiac Troponin negative     PMHX: HTN, HLD, Anxiety, COPD, Asthma with O2 2 liters via nasal cannula.

## 2023-07-02 RX ADMIN — Medication 125 MICROGRAM(S): at 05:06

## 2023-07-02 RX ADMIN — LOSARTAN POTASSIUM 100 MILLIGRAM(S): 100 TABLET, FILM COATED ORAL at 05:04

## 2023-07-02 RX ADMIN — Medication 3 MILLIGRAM(S): at 21:05

## 2023-07-02 RX ADMIN — Medication 1 GRAM(S): at 05:03

## 2023-07-02 RX ADMIN — ATORVASTATIN CALCIUM 40 MILLIGRAM(S): 80 TABLET, FILM COATED ORAL at 21:05

## 2023-07-02 RX ADMIN — NYSTATIN CREAM 1 APPLICATION(S): 100000 CREAM TOPICAL at 05:06

## 2023-07-02 RX ADMIN — Medication 60 MILLIGRAM(S): at 00:54

## 2023-07-02 RX ADMIN — GABAPENTIN 300 MILLIGRAM(S): 400 CAPSULE ORAL at 05:04

## 2023-07-02 RX ADMIN — TIOTROPIUM BROMIDE 2 PUFF(S): 18 CAPSULE ORAL; RESPIRATORY (INHALATION) at 11:39

## 2023-07-02 RX ADMIN — PANTOPRAZOLE SODIUM 40 MILLIGRAM(S): 20 TABLET, DELAYED RELEASE ORAL at 05:06

## 2023-07-02 RX ADMIN — OLANZAPINE 10 MILLIGRAM(S): 15 TABLET, FILM COATED ORAL at 11:39

## 2023-07-02 RX ADMIN — Medication 40 MILLIGRAM(S): at 05:06

## 2023-07-02 RX ADMIN — Medication 400 MILLIGRAM(S): at 11:40

## 2023-07-02 RX ADMIN — Medication 81 MILLIGRAM(S): at 11:40

## 2023-07-02 RX ADMIN — DULOXETINE HYDROCHLORIDE 60 MILLIGRAM(S): 30 CAPSULE, DELAYED RELEASE ORAL at 18:10

## 2023-07-02 RX ADMIN — Medication 60 MILLIGRAM(S): at 18:09

## 2023-07-02 RX ADMIN — Medication 1 GRAM(S): at 11:39

## 2023-07-02 RX ADMIN — Medication 1 GRAM(S): at 23:13

## 2023-07-02 RX ADMIN — DULOXETINE HYDROCHLORIDE 60 MILLIGRAM(S): 30 CAPSULE, DELAYED RELEASE ORAL at 05:05

## 2023-07-02 RX ADMIN — Medication 75 MILLIGRAM(S): at 05:05

## 2023-07-02 RX ADMIN — Medication 60 MILLIGRAM(S): at 05:03

## 2023-07-02 RX ADMIN — Medication 10 MILLIGRAM(S): at 18:10

## 2023-07-02 RX ADMIN — Medication 75 MILLIGRAM(S): at 18:10

## 2023-07-02 RX ADMIN — Medication 0.5 MILLIGRAM(S): at 12:29

## 2023-07-02 RX ADMIN — PANTOPRAZOLE SODIUM 40 MILLIGRAM(S): 20 TABLET, DELAYED RELEASE ORAL at 18:11

## 2023-07-02 RX ADMIN — Medication 1 GRAM(S): at 18:09

## 2023-07-02 RX ADMIN — ENOXAPARIN SODIUM 40 MILLIGRAM(S): 100 INJECTION SUBCUTANEOUS at 23:12

## 2023-07-02 RX ADMIN — NYSTATIN CREAM 1 APPLICATION(S): 100000 CREAM TOPICAL at 18:11

## 2023-07-02 RX ADMIN — Medication 60 MILLIGRAM(S): at 13:03

## 2023-07-02 RX ADMIN — AMLODIPINE BESYLATE 5 MILLIGRAM(S): 2.5 TABLET ORAL at 05:05

## 2023-07-02 RX ADMIN — Medication 1 GRAM(S): at 00:53

## 2023-07-02 RX ADMIN — Medication 60 MILLIGRAM(S): at 23:12

## 2023-07-02 RX ADMIN — Medication 0.5 MILLIGRAM(S): at 05:03

## 2023-07-02 RX ADMIN — MONTELUKAST 10 MILLIGRAM(S): 4 TABLET, CHEWABLE ORAL at 11:40

## 2023-07-02 RX ADMIN — GABAPENTIN 300 MILLIGRAM(S): 400 CAPSULE ORAL at 18:10

## 2023-07-02 RX ADMIN — Medication 10 MILLIGRAM(S): at 05:04

## 2023-07-02 NOTE — PROGRESS NOTE ADULT - ASSESSMENT
72 f with    Acute on Chronic Systolic Congestive Heart Failure  - telemetry  - diurese  - cardiology follow    Back pain  - pain control  - Xray C T spine noted    Dysphagia/ Esophageal spasms  - esophagogram noted  - CCB for esophageal spasms  - esophageal manometry as OTP  - Gastroenterology follow     Cellulitis legs vs Dermatitis  - observe off antibiotic  - steroid cream   - observe    Anxiety / Depression   - control  - Psych evaluation  - Ativan 0.5 mg bid prn    Asthma   - nebs    Breast cancer in situ, left   - stable. Follow as OTP with Oncology    COPD (chronic obstructive pulmonary disease)   - nebs    Graves disease   - follow TSH    Hyperlipidemia   - stable    Hypertension   - control    Hypothyroid   - continue supplementation    Kidney cancer, primary, with metastasis from kidney to other site, left LEft sided- s/p nephrectomy only- no chemo or RT  - stable    Obesity   - Nutrition education    DVT prophylaxis  - AC     DCP in progress probably rehab.     d/w patient,  and son.    Nathan Mora MD phone [phone]

## 2023-07-02 NOTE — PROGRESS NOTE ADULT - ASSESSMENT
Dysphagia  IBS  Diarrhea    Speech and swallow eval.  noted  diet per SLP recommendations   Xray Esophagram with dysmotility; diet modification and importance of soft food d/w pt  started on diltiazem QID for esophageal spasms   will need outpatient manometry testing with Dr. Homa Taylor   PPI BID  GI PCR negative  imodium prn diarrhea   aspiration precautions  cardiology f/u  discharge planning as per primary team, will need close outpatient GI follow up  d/w pt       I reviewed the overnight course of events on the unit, re-confirming the patient history. I discussed the care with the patient and their family  The plan of care was discussed with the physician assistant and modifications were made to the notation where appropriate.   Differential diagnosis and plan of care discussed with patient after the evaluation  35 minutes spent on total encounter of which more than fifty percent of the encounter was spent counseling and/or coordinating care by the attending physician.  Advanced care planning was discussed with patient and family.  Advanced care planning forms were reviewed and discussed.  Risks, benefits and alternatives of gastroenterologic procedures were discussed in detail and all questions were answered.

## 2023-07-03 LAB
ANION GAP SERPL CALC-SCNC: 12 MMOL/L — SIGNIFICANT CHANGE UP (ref 5–17)
BUN SERPL-MCNC: 11 MG/DL — SIGNIFICANT CHANGE UP (ref 7–23)
CALCIUM SERPL-MCNC: 10 MG/DL — SIGNIFICANT CHANGE UP (ref 8.4–10.5)
CHLORIDE SERPL-SCNC: 91 MMOL/L — LOW (ref 96–108)
CO2 SERPL-SCNC: 30 MMOL/L — SIGNIFICANT CHANGE UP (ref 22–31)
CREAT SERPL-MCNC: 0.85 MG/DL — SIGNIFICANT CHANGE UP (ref 0.5–1.3)
EGFR: 72 ML/MIN/1.73M2 — SIGNIFICANT CHANGE UP
GLUCOSE SERPL-MCNC: 126 MG/DL — HIGH (ref 70–99)
POTASSIUM SERPL-MCNC: 3.6 MMOL/L — SIGNIFICANT CHANGE UP (ref 3.5–5.3)
POTASSIUM SERPL-SCNC: 3.6 MMOL/L — SIGNIFICANT CHANGE UP (ref 3.5–5.3)
SODIUM SERPL-SCNC: 133 MMOL/L — LOW (ref 135–145)

## 2023-07-03 RX ORDER — POTASSIUM CHLORIDE 20 MEQ
40 PACKET (EA) ORAL ONCE
Refills: 0 | Status: DISCONTINUED | OUTPATIENT
Start: 2023-07-03 | End: 2023-07-03

## 2023-07-03 RX ORDER — ISOSORBIDE DINITRATE 5 MG/1
5 TABLET ORAL
Refills: 0 | Status: DISCONTINUED | OUTPATIENT
Start: 2023-07-03 | End: 2023-07-06

## 2023-07-03 RX ORDER — POTASSIUM CHLORIDE 20 MEQ
40 PACKET (EA) ORAL ONCE
Refills: 0 | Status: COMPLETED | OUTPATIENT
Start: 2023-07-03 | End: 2023-07-03

## 2023-07-03 RX ORDER — AMITRIPTYLINE HCL 25 MG
10 TABLET ORAL AT BEDTIME
Refills: 0 | Status: DISCONTINUED | OUTPATIENT
Start: 2023-07-03 | End: 2023-07-06

## 2023-07-03 RX ADMIN — MONTELUKAST 10 MILLIGRAM(S): 4 TABLET, CHEWABLE ORAL at 12:54

## 2023-07-03 RX ADMIN — Medication 10 MILLIGRAM(S): at 23:32

## 2023-07-03 RX ADMIN — Medication 75 MILLIGRAM(S): at 05:12

## 2023-07-03 RX ADMIN — LOSARTAN POTASSIUM 100 MILLIGRAM(S): 100 TABLET, FILM COATED ORAL at 05:22

## 2023-07-03 RX ADMIN — Medication 10 MILLIGRAM(S): at 05:13

## 2023-07-03 RX ADMIN — NYSTATIN CREAM 1 APPLICATION(S): 100000 CREAM TOPICAL at 05:14

## 2023-07-03 RX ADMIN — Medication 125 MICROGRAM(S): at 05:14

## 2023-07-03 RX ADMIN — Medication 10 MILLIGRAM(S): at 18:26

## 2023-07-03 RX ADMIN — Medication 60 MILLIGRAM(S): at 05:14

## 2023-07-03 RX ADMIN — ATORVASTATIN CALCIUM 40 MILLIGRAM(S): 80 TABLET, FILM COATED ORAL at 22:00

## 2023-07-03 RX ADMIN — DULOXETINE HYDROCHLORIDE 60 MILLIGRAM(S): 30 CAPSULE, DELAYED RELEASE ORAL at 18:26

## 2023-07-03 RX ADMIN — Medication 60 MILLIGRAM(S): at 12:55

## 2023-07-03 RX ADMIN — TIOTROPIUM BROMIDE 2 PUFF(S): 18 CAPSULE ORAL; RESPIRATORY (INHALATION) at 12:56

## 2023-07-03 RX ADMIN — ENOXAPARIN SODIUM 40 MILLIGRAM(S): 100 INJECTION SUBCUTANEOUS at 23:20

## 2023-07-03 RX ADMIN — AMLODIPINE BESYLATE 5 MILLIGRAM(S): 2.5 TABLET ORAL at 05:12

## 2023-07-03 RX ADMIN — Medication 0.5 MILLIGRAM(S): at 14:40

## 2023-07-03 RX ADMIN — Medication 40 MILLIGRAM(S): at 05:13

## 2023-07-03 RX ADMIN — GABAPENTIN 300 MILLIGRAM(S): 400 CAPSULE ORAL at 18:26

## 2023-07-03 RX ADMIN — Medication 1 GRAM(S): at 12:54

## 2023-07-03 RX ADMIN — Medication 1 GRAM(S): at 18:25

## 2023-07-03 RX ADMIN — Medication 1 GRAM(S): at 23:33

## 2023-07-03 RX ADMIN — GABAPENTIN 300 MILLIGRAM(S): 400 CAPSULE ORAL at 05:13

## 2023-07-03 RX ADMIN — Medication 75 MILLIGRAM(S): at 18:27

## 2023-07-03 RX ADMIN — Medication 81 MILLIGRAM(S): at 12:56

## 2023-07-03 RX ADMIN — ISOSORBIDE DINITRATE 5 MILLIGRAM(S): 5 TABLET ORAL at 18:25

## 2023-07-03 RX ADMIN — NYSTATIN CREAM 1 APPLICATION(S): 100000 CREAM TOPICAL at 18:27

## 2023-07-03 RX ADMIN — PANTOPRAZOLE SODIUM 40 MILLIGRAM(S): 20 TABLET, DELAYED RELEASE ORAL at 18:26

## 2023-07-03 RX ADMIN — OLANZAPINE 10 MILLIGRAM(S): 15 TABLET, FILM COATED ORAL at 12:56

## 2023-07-03 RX ADMIN — Medication 3 MILLIGRAM(S): at 22:00

## 2023-07-03 RX ADMIN — Medication 1 GRAM(S): at 05:13

## 2023-07-03 RX ADMIN — Medication 400 MILLIGRAM(S): at 12:56

## 2023-07-03 RX ADMIN — DULOXETINE HYDROCHLORIDE 60 MILLIGRAM(S): 30 CAPSULE, DELAYED RELEASE ORAL at 05:13

## 2023-07-03 RX ADMIN — Medication 0.5 MILLIGRAM(S): at 06:52

## 2023-07-03 RX ADMIN — PANTOPRAZOLE SODIUM 40 MILLIGRAM(S): 20 TABLET, DELAYED RELEASE ORAL at 05:14

## 2023-07-03 NOTE — PROGRESS NOTE ADULT - ASSESSMENT
72 f with    Acute on Chronic Systolic Congestive Heart Failure  - telemetry  - diurese  - cardiology follow    Back pain  - pain control  - Xray C T spine noted    Dysphagia/ Esophageal spasms  - esophagogram noted  - CCB for esophageal spasms  - esophageal manometry as OTP  - Gastroenterology follow     Cellulitis legs vs Dermatitis  - observe off antibiotic  - steroid cream   - observe    Anxiety / Depression   - control  - Psych evaluation  - Ativan 0.5 mg bid prn    Asthma   - nebs    Breast cancer in situ, left   - stable. Follow as OTP with Oncology    COPD (chronic obstructive pulmonary disease)   - nebs    Graves disease   - follow TSH    Hyperlipidemia   - stable    Hypertension   - control    Hypothyroid   - continue supplementation    Kidney cancer, primary, with metastasis from kidney to other site, left LEft sided- s/p nephrectomy only- no chemo or RT  - stable    Obesity   - Nutrition education    DVT prophylaxis  - AC     DCP in progress rehab.     d/w patient.    Nathan Mora MD phone [phone]

## 2023-07-03 NOTE — PROGRESS NOTE ADULT - ASSESSMENT
Dysphagia  IBS  Diarrhea    Speech and swallow eval.  noted  diet per SLP recommendations   Xray Esophagram with dysmotility; diet modification and importance of soft food d/w pt  dc diltiazem  switch to isosorbide dinitrate 5 mg BID and amitriptyline 10mg at bedtime for spasms   will need outpatient manometry testing with Dr. Homa Taylor   PPI BID  GI PCR negative  imodium prn diarrhea   aspiration precautions  cardiology f/u  d/w pt   d/w medicine       I reviewed the overnight course of events on the unit, re-confirming the patient history. I discussed the care with the patient and their family  The plan of care was discussed with the physician assistant and modifications were made to the notation where appropriate.   Differential diagnosis and plan of care discussed with patient after the evaluation  35 minutes spent on total encounter of which more than fifty percent of the encounter was spent counseling and/or coordinating care by the attending physician.  Advanced care planning was discussed with patient and family.  Advanced care planning forms were reviewed and discussed.  Risks, benefits and alternatives of gastroenterologic procedures were discussed in detail and all questions were answered.

## 2023-07-03 NOTE — CHART NOTE - NSCHARTNOTEFT_GEN_A_CORE
72-year-old female history of COPD on 2 L nasal cannula at home, Graves' disease, HLD, HTN, breast cancer, kidney cancer, skin cancer all in remission presenting with a chief complaint of chest pain x2 months. Patient states that pain radiates to her back and her mouth feels dry. Patient is also having difficulty swallowing and feels like she is having a choking sensation.  Patient describes symptoms as a tightness. Patient at baseline is not able to ambulate and does not know if movement makes symptoms worse. Patient denies any recent illnesses, nausea, vomiting any headaches, visual changes, diarrhea, urinary symptoms.    Continued Hx course:  6/24 Chart note: Patient c/o of anxiety  6/24 ED ADULT: ACP contacted regarding pt diet status. Pt passed dysphagia screen.  Chest Xray (6/24) IMPRESSION: Mild pulmonary venous hypertension/pulmonary edema with small bilateral pleural effusions.  CT Chest (6/24) IMPRESSION: Emphysema. Indeterminate 2.8 cm hypodense lesion in the spleen.  Cardiology consulted (6/26) cardiac stable.   GI consulted (6/26): Patient completed esophagram. The esophagus demonstrates normal distensibility and course. However, extensive tertiary contractions are seen throughout the esophagus. There is extensive esophageal reflux. There is no abnormal extrinsic mass effect. Contrast passes freely into the stomach. There is no definite hiatal hernia.  RRT 6/27: As per Telemetry, patient had 10 beats Wide complex and VTach Episode. Patient stable s/p RRT.  7/3: No new events as per GI, Cardiology, and Internal medicine.      Dysphagia hx: Patient seen by this service for bedside swallow evaluation on 6/27. Patient recommended for diet of soft & bite sized/thin liquids as per discussion with GI JOHNATHON Mott. On 6/30/23 follow-up for diet monitor at which time patient tolerated puree/thin liquids trials but refused soft & bite sized consistencies due to concerns re: choking.    Today (7/3): This service saw patient for diet monitor as per JOHNATHON Beckham request d/t patient refusing diet recommendations. Patient received seated upright in bed, on 2L NC, A&Ox4. Patient tolerated self administered trials of puree and thin liquid trials without overt s/s of aspiration or penetration. Patient refusing soft & bite sized consistencies. Consider modifying diet to puree and thin liquids d/t patient concerns re: choking. Recommending dietary/nutrition to address behavioral food preferences. Discussed with JOHNATHON Beckham and ADDI Schmitt (covering for [employer]).    IMPRESSIONS:  1.From an toshia-pharyngeal standpoint patient cleared to continue on soft & bite sized/thin liquid diet. Consider diet modification to puree/thin liquids as patient concerned with choking/chest tightness. Suspect primarily GI etiologies for dysphagia symptoms. This service to continue to monitor.     RECOMMENDATIONS:  1. Soft & bite size/ thin liquid diet; Consider diet modification to puree/thin liquids d/t patient concerns re: choking.   2. Monitor for s/s aspiration/laryngeal penetration. If noted:  D/C p.o. intake, provide non-oral nutrition/hydration/meds, and contact this service @ [phone]       D/W ADDI Schrader and DEMARCO Sifuentes.    Speech Language Pathology   Saira Sanford MA CCC-SLP  Teams preferred; [phone].

## 2023-07-04 RX ADMIN — GABAPENTIN 300 MILLIGRAM(S): 400 CAPSULE ORAL at 05:26

## 2023-07-04 RX ADMIN — LOSARTAN POTASSIUM 100 MILLIGRAM(S): 100 TABLET, FILM COATED ORAL at 05:26

## 2023-07-04 RX ADMIN — Medication 40 MILLIGRAM(S): at 05:26

## 2023-07-04 RX ADMIN — Medication 10 MILLIGRAM(S): at 17:12

## 2023-07-04 RX ADMIN — Medication 1 GRAM(S): at 23:53

## 2023-07-04 RX ADMIN — PANTOPRAZOLE SODIUM 40 MILLIGRAM(S): 20 TABLET, DELAYED RELEASE ORAL at 17:13

## 2023-07-04 RX ADMIN — ENOXAPARIN SODIUM 40 MILLIGRAM(S): 100 INJECTION SUBCUTANEOUS at 23:52

## 2023-07-04 RX ADMIN — ATORVASTATIN CALCIUM 40 MILLIGRAM(S): 80 TABLET, FILM COATED ORAL at 21:12

## 2023-07-04 RX ADMIN — Medication 10 MILLIGRAM(S): at 05:26

## 2023-07-04 RX ADMIN — DULOXETINE HYDROCHLORIDE 60 MILLIGRAM(S): 30 CAPSULE, DELAYED RELEASE ORAL at 05:27

## 2023-07-04 RX ADMIN — AMLODIPINE BESYLATE 5 MILLIGRAM(S): 2.5 TABLET ORAL at 05:25

## 2023-07-04 RX ADMIN — OLANZAPINE 10 MILLIGRAM(S): 15 TABLET, FILM COATED ORAL at 11:58

## 2023-07-04 RX ADMIN — Medication 75 MILLIGRAM(S): at 17:13

## 2023-07-04 RX ADMIN — PANTOPRAZOLE SODIUM 40 MILLIGRAM(S): 20 TABLET, DELAYED RELEASE ORAL at 05:27

## 2023-07-04 RX ADMIN — MONTELUKAST 10 MILLIGRAM(S): 4 TABLET, CHEWABLE ORAL at 11:58

## 2023-07-04 RX ADMIN — ISOSORBIDE DINITRATE 5 MILLIGRAM(S): 5 TABLET ORAL at 05:25

## 2023-07-04 RX ADMIN — Medication 0.5 MILLIGRAM(S): at 13:36

## 2023-07-04 RX ADMIN — Medication 3 MILLIGRAM(S): at 21:13

## 2023-07-04 RX ADMIN — NYSTATIN CREAM 1 APPLICATION(S): 100000 CREAM TOPICAL at 17:13

## 2023-07-04 RX ADMIN — GABAPENTIN 300 MILLIGRAM(S): 400 CAPSULE ORAL at 17:12

## 2023-07-04 RX ADMIN — Medication 10 MILLIGRAM(S): at 21:12

## 2023-07-04 RX ADMIN — Medication 125 MICROGRAM(S): at 05:26

## 2023-07-04 RX ADMIN — ISOSORBIDE DINITRATE 5 MILLIGRAM(S): 5 TABLET ORAL at 17:11

## 2023-07-04 RX ADMIN — NYSTATIN CREAM 1 APPLICATION(S): 100000 CREAM TOPICAL at 05:28

## 2023-07-04 RX ADMIN — Medication 75 MILLIGRAM(S): at 05:27

## 2023-07-04 RX ADMIN — TIOTROPIUM BROMIDE 2 PUFF(S): 18 CAPSULE ORAL; RESPIRATORY (INHALATION) at 11:59

## 2023-07-04 RX ADMIN — Medication 1 GRAM(S): at 11:59

## 2023-07-04 RX ADMIN — Medication 1 GRAM(S): at 05:25

## 2023-07-04 RX ADMIN — Medication 400 MILLIGRAM(S): at 11:58

## 2023-07-04 RX ADMIN — Medication 81 MILLIGRAM(S): at 11:58

## 2023-07-04 RX ADMIN — DULOXETINE HYDROCHLORIDE 60 MILLIGRAM(S): 30 CAPSULE, DELAYED RELEASE ORAL at 17:12

## 2023-07-04 RX ADMIN — Medication 1 GRAM(S): at 17:11

## 2023-07-04 NOTE — PROGRESS NOTE ADULT - ASSESSMENT
72 f with    Acute on Chronic Systolic Congestive Heart Failure  - telemetry  - diurese  - cardiology follow    Back pain  - pain control  - Xray C T spine noted    Dysphagia/ Esophageal spasms  - esophagogram noted  - CCB for esophageal spasms  - esophageal manometry as OTP  - Gastroenterology follow     Cellulitis legs vs Dermatitis  - observe off antibiotic  - steroid cream   - observe    Anxiety / Depression   - control  - Psych evaluation  - Ativan 0.5 mg bid prn    Asthma   - nebs    Breast cancer in situ, left   - stable. Follow as OTP with Oncology    COPD (chronic obstructive pulmonary disease)   - nebs    Graves disease   - follow TSH    Hyperlipidemia   - stable    Hypertension   - control    Hypothyroid   - continue supplementation    Kidney cancer, primary, with metastasis from kidney to other site, left LEft sided- s/p nephrectomy only- no chemo or RT  - stable    Obesity   - Nutrition education    DVT prophylaxis  - AC     DCP in progress rehab vs home.     d/w patient, .    Nathan Mora MD phone [phone]

## 2023-07-05 RX ADMIN — Medication 10 MILLIGRAM(S): at 21:42

## 2023-07-05 RX ADMIN — Medication 75 MILLIGRAM(S): at 05:14

## 2023-07-05 RX ADMIN — Medication 1 GRAM(S): at 17:30

## 2023-07-05 RX ADMIN — GABAPENTIN 300 MILLIGRAM(S): 400 CAPSULE ORAL at 17:30

## 2023-07-05 RX ADMIN — OLANZAPINE 10 MILLIGRAM(S): 15 TABLET, FILM COATED ORAL at 12:10

## 2023-07-05 RX ADMIN — NYSTATIN CREAM 1 APPLICATION(S): 100000 CREAM TOPICAL at 17:33

## 2023-07-05 RX ADMIN — Medication 0.5 MILLIGRAM(S): at 01:47

## 2023-07-05 RX ADMIN — ENOXAPARIN SODIUM 40 MILLIGRAM(S): 100 INJECTION SUBCUTANEOUS at 21:42

## 2023-07-05 RX ADMIN — ISOSORBIDE DINITRATE 5 MILLIGRAM(S): 5 TABLET ORAL at 14:40

## 2023-07-05 RX ADMIN — Medication 1 GRAM(S): at 05:15

## 2023-07-05 RX ADMIN — PANTOPRAZOLE SODIUM 40 MILLIGRAM(S): 20 TABLET, DELAYED RELEASE ORAL at 17:32

## 2023-07-05 RX ADMIN — Medication 400 MILLIGRAM(S): at 12:13

## 2023-07-05 RX ADMIN — DULOXETINE HYDROCHLORIDE 60 MILLIGRAM(S): 30 CAPSULE, DELAYED RELEASE ORAL at 05:14

## 2023-07-05 RX ADMIN — Medication 40 MILLIGRAM(S): at 05:15

## 2023-07-05 RX ADMIN — Medication 3 MILLIGRAM(S): at 21:42

## 2023-07-05 RX ADMIN — NYSTATIN CREAM 1 APPLICATION(S): 100000 CREAM TOPICAL at 05:15

## 2023-07-05 RX ADMIN — MONTELUKAST 10 MILLIGRAM(S): 4 TABLET, CHEWABLE ORAL at 12:10

## 2023-07-05 RX ADMIN — GABAPENTIN 300 MILLIGRAM(S): 400 CAPSULE ORAL at 05:15

## 2023-07-05 RX ADMIN — DULOXETINE HYDROCHLORIDE 60 MILLIGRAM(S): 30 CAPSULE, DELAYED RELEASE ORAL at 17:31

## 2023-07-05 RX ADMIN — LOSARTAN POTASSIUM 100 MILLIGRAM(S): 100 TABLET, FILM COATED ORAL at 05:14

## 2023-07-05 RX ADMIN — ALBUTEROL 2 PUFF(S): 90 AEROSOL, METERED ORAL at 12:11

## 2023-07-05 RX ADMIN — Medication 0.5 MILLIGRAM(S): at 08:11

## 2023-07-05 RX ADMIN — Medication 10 MILLIGRAM(S): at 05:14

## 2023-07-05 RX ADMIN — Medication 1 GRAM(S): at 21:42

## 2023-07-05 RX ADMIN — ISOSORBIDE DINITRATE 5 MILLIGRAM(S): 5 TABLET ORAL at 05:15

## 2023-07-05 RX ADMIN — Medication 1 GRAM(S): at 12:10

## 2023-07-05 RX ADMIN — AMLODIPINE BESYLATE 5 MILLIGRAM(S): 2.5 TABLET ORAL at 05:15

## 2023-07-05 RX ADMIN — TIOTROPIUM BROMIDE 2 PUFF(S): 18 CAPSULE ORAL; RESPIRATORY (INHALATION) at 12:11

## 2023-07-05 RX ADMIN — PANTOPRAZOLE SODIUM 40 MILLIGRAM(S): 20 TABLET, DELAYED RELEASE ORAL at 05:13

## 2023-07-05 RX ADMIN — Medication 75 MILLIGRAM(S): at 17:32

## 2023-07-05 RX ADMIN — ATORVASTATIN CALCIUM 40 MILLIGRAM(S): 80 TABLET, FILM COATED ORAL at 21:42

## 2023-07-05 RX ADMIN — Medication 10 MILLIGRAM(S): at 17:32

## 2023-07-05 RX ADMIN — Medication 125 MICROGRAM(S): at 05:14

## 2023-07-05 NOTE — DIETITIAN INITIAL EVALUATION ADULT - NSFNSGIIOFT_GEN_A_CORE
Pt confirms Ht 60"  Dosing wt: 116.2kg (6/29)   UBW: pt states she does not know her UBW  Wt from current admission (bedscale): 251.7lb (7/5), 260.1lb (7/3), 256.3lb (6/30) - on Lasix for dx CHF might cause wt fluctuation, pt w/ edema. RD will continue to monitor wt trends as available/able.   Wt history from previous admission: 291lb (3/23/23), 233.9lb (1/31/21)   adjusted IBW for high BMI: 110lb (100lb + 10%)

## 2023-07-05 NOTE — DIETITIAN INITIAL EVALUATION ADULT - REASON FOR ADMISSION
Chest pain    Per chart: "72-year-old female history of COPD on 2 L nasal cannula at home, Graves' disease, HLD, HTN, breast cancer, kidney cancer, skin cancer all in remission presenting with a chief complaint of chest pain x2 months.  Patient states that pain radiates to her back and her mouth feels dry. Patient is also having difficulty swallowing. Patient states that she feels like she is having a choking sensation.  Patient describes symptoms as a tightness.  Patient states that she is coming in today because the symptoms are progressively getting worse.  Patient at baseline is not able to ambulate and does not know if movement makes symptoms worse.  Patient reports shortness of breath as well.  The patient's  is at bedside and reports that the patient also has had leg swelling.   reports that patient has had cellulitis in the past.  And he has noticed that there is increased redness of the leg.  Patient denies any recent illnesses, nausea, vomiting any headaches, visual changes, diarrhea, urinary symptoms."

## 2023-07-05 NOTE — DIETITIAN INITIAL EVALUATION ADULT - NS FNS DIET ORDER
Diet, Pureed:   Supplement Feeding Modality:  Oral  Ensure Enlive Cans or Servings Per Day:  3       Frequency:  Daily (07-05-23 @ 14:44) [Active]

## 2023-07-05 NOTE — PROGRESS NOTE ADULT - NUTRITIONAL ASSESSMENT
This patient has been assessed with a concern for Malnutrition and has been determined to have a diagnosis/diagnoses of Morbid obesity (BMI > 40).    This patient is being managed with:   Diet Pureed-  Supplement Feeding Modality:  Oral  Ensure Plus High Protein Cans or Servings Per Day:  2       Frequency:  Daily  Entered: Jul 5 2023  3:12PM    Diet Pureed-  Supplement Feeding Modality:  Oral  Ensure Enlive Cans or Servings Per Day:  3       Frequency:  Daily  Entered: Jul 5 2023  2:45PM    The following pending diet order is being considered for treatment of Morbid obesity (BMI > 40):null

## 2023-07-05 NOTE — DIETITIAN INITIAL EVALUATION ADULT - ORAL NUTRITION SUPPLEMENTS
Recommend to decrease Ensure to 2x daily (700kcal, 40g proteins) to ensure adequate PO intake while in house.

## 2023-07-05 NOTE — DIETITIAN INITIAL EVALUATION ADULT - ADD RECOMMEND
1. Recommend multivitamins/minerals, pending no medical contraindication, for micronutrient support.   2. Monitor PO intake, GI tolerance, skin integrity, labs, weight, and bowel movement regularity.   3. Will continue to honor food and beverage preferences in an effort to maximize level of nutrient intake.  4. Assist with meals PRN.  7. BMI >40 alert placed in chart.

## 2023-07-05 NOTE — DIETITIAN INITIAL EVALUATION ADULT - REASON INDICATOR FOR ASSESSMENT
Pt seen for consult for nutrition education and BMI >40. Information obtained from pt, RN, electronic medical record,  at bedside. Chart reviewed, events noted.

## 2023-07-05 NOTE — DIETITIAN INITIAL EVALUATION ADULT - EDUCATION DIETARY MODIFICATIONS
Continue to promote PO intake at meals/snacks with consumption of oral nutrition supplements between meals. Encouraged PO intake as tolerated with emphasis on protein foods as tolerated. Educated on foods rich in protein and encouraged consumption as able./(1) partially meets; needs review/practice/verbalization

## 2023-07-05 NOTE — DIETITIAN INITIAL EVALUATION ADULT - OTHER CALCULATIONS
Fluid needs deferred to team. Energy and protein needs based on IBW of 110lb in consideration of obese, dx CHF.

## 2023-07-05 NOTE — DIETITIAN INITIAL EVALUATION ADULT - PERTINENT MEDS FT
MEDICATIONS  (STANDING):  amitriptyline 10 milliGRAM(s) Oral at bedtime  amLODIPine   Tablet 5 milliGRAM(s) Oral daily  atorvastatin 40 milliGRAM(s) Oral at bedtime  busPIRone 10 milliGRAM(s) Oral two times a day  DULoxetine 60 milliGRAM(s) Oral two times a day  enoxaparin Injectable 40 milliGRAM(s) SubCutaneous every 24 hours  furosemide    Tablet 40 milliGRAM(s) Oral daily  gabapentin 300 milliGRAM(s) Oral two times a day  hydrALAZINE 75 milliGRAM(s) Oral two times a day  isosorbide   dinitrate Tablet (ISORDIL) 5 milliGRAM(s) Oral two times a day  levothyroxine 125 MICROGram(s) Oral daily  losartan 100 milliGRAM(s) Oral daily  montelukast 10 milliGRAM(s) Oral daily  nystatin Powder 1 Application(s) Topical two times a day  OLANZapine 10 milliGRAM(s) Oral daily  pantoprazole  Injectable 40 milliGRAM(s) IV Push two times a day  pentoxifylline 400 milliGRAM(s) Oral daily  propranolol 20 milliGRAM(s) Oral two times a day  sucralfate 1 Gram(s) Oral four times a day  tiotropium 2.5 MICROgram(s) Inhaler 2 Puff(s) Inhalation daily    MEDICATIONS  (PRN):  acetaminophen     Tablet .. 650 milliGRAM(s) Oral every 6 hours PRN Temp greater or equal to 38.5C (101.3F), Mild Pain (1 - 3)  albuterol    90 MICROgram(s) HFA Inhaler 2 Puff(s) Inhalation every 6 hours PRN Shortness of Breath and/or Wheezing  loperamide 2 milliGRAM(s) Oral two times a day PRN Diarrhea  melatonin 3 milliGRAM(s) Oral at bedtime PRN Insomnia  oxycodone    5 mG/acetaminophen 325 mG 1 Tablet(s) Oral every 6 hours PRN Moderate Pain (4 - 6)

## 2023-07-05 NOTE — DIETITIAN INITIAL EVALUATION ADULT - ORAL INTAKE PTA/DIET HISTORY
Pt reports good PO intake at baseline however noted w/ decreased PO intake PTA due to chest pain and c/o difficulty swallowing foods. Reports not on any therapeutic restriction PTA.  Confirms allergy to grape, peach, shellfish  Micronutrient/Other supplementation: vitamin B12 per H&P  Protein-energy supplementation: none

## 2023-07-05 NOTE — PROGRESS NOTE ADULT - ASSESSMENT
Dysphagia  IBS  Diarrhea    Speech and swallow eval.  noted  diet per SLP recommendations   Xray Esophagram with dysmotility; diet modification and importance of soft food d/w pt  dc diltiazem  switch to isosorbide dinitrate 5 mg BID and amitriptyline 10mg at bedtime for spasms   will need outpatient manometry testing with Dr. Homa Taylor   PPI BID  GI PCR negative  imodium prn diarrhea   aspiration precautions  cardiology f/u  will need outpatient GI follow up   d/w pt   d/w medicine       I reviewed the overnight course of events on the unit, re-confirming the patient history. I discussed the care with the patient and their family  The plan of care was discussed with the physician assistant and modifications were made to the notation where appropriate.   Differential diagnosis and plan of care discussed with patient after the evaluation  35 minutes spent on total encounter of which more than fifty percent of the encounter was spent counseling and/or coordinating care by the attending physician.  Advanced care planning was discussed with patient and family.  Advanced care planning forms were reviewed and discussed.  Risks, benefits and alternatives of gastroenterologic procedures were discussed in detail and all questions were answered.    Dysphagia  IBS  Diarrhea    Speech and swallow eval.  noted  diet per SLP recommendations   Xray Esophagram with dysmotility; diet modification and importance of soft food d/w pt  dc diltiazem  switch to isosorbide dinitrate 5 mg BID and amitriptyline 10mg at bedtime for spasms   will need outpatient manometry testing with Dr. Homa Taylor   stop oral potassium and asa if no strong indication, will make spasms worse  PPI BID  GI PCR negative  imodium prn diarrhea   aspiration precautions  will need outpatient GI follow up   d/w pt   d/w medicine       I reviewed the overnight course of events on the unit, re-confirming the patient history. I discussed the care with the patient and their family  The plan of care was discussed with the physician assistant and modifications were made to the notation where appropriate.   Differential diagnosis and plan of care discussed with patient after the evaluation  35 minutes spent on total encounter of which more than fifty percent of the encounter was spent counseling and/or coordinating care by the attending physician.  Advanced care planning was discussed with patient and family.  Advanced care planning forms were reviewed and discussed.  Risks, benefits and alternatives of gastroenterologic procedures were discussed in detail and all questions were answered.

## 2023-07-05 NOTE — DIETITIAN INITIAL EVALUATION ADULT - CONTINUE CURRENT NUTRITION CARE PLAN
Continue current diet as tolerated, defer diet consistency to team/SLP. RD remains available to adjust diet as needed./yes

## 2023-07-05 NOTE — DIETITIAN INITIAL EVALUATION ADULT - OTHER INFO
-- Pt is on Amitriptyline, Isosorbide, Amlodipine, Hydralazine, Atorvastatin, Lasix, Carafate, Protonix, Loperamide, Buspirone, Cymbalta, Synthroid, Olanzapine, Trental, Propanolol.   -- Pt w/ episodes of hypokalemia, s/p multiple K supplements.   -- s/p RRT on 6/27

## 2023-07-05 NOTE — PROGRESS NOTE ADULT - ASSESSMENT
72 f with    Acute on Chronic Systolic Congestive Heart Failure  - telemetry  - diurese  - cardiology follow    Back pain  - pain control  - Xray C T spine noted    Dysphagia/ Esophageal spasms  - esophagogram noted  - CCB for esophageal spasms  - esophageal manometry as OTP  - Gastroenterology follow     Legs Dermatitis  - observe off antibiotic  - steroid cream   - observe    Anxiety / Depression   - control  - Psych evaluation  - Ativan 0.5 mg bid prn    Asthma   - nebs    Breast cancer in situ, left   - stable. Follow as OTP with Oncology    COPD (chronic obstructive pulmonary disease)   - nebs    Graves disease   - follow TSH    Hyperlipidemia   - stable    Hypertension   - control    Hypothyroid   - continue supplementation    Kidney cancer, primary, with metastasis from kidney to other site, left LEft sided- s/p nephrectomy only- no chemo or RT  - stable    Obesity   - Nutrition education    DVT prophylaxis  - AC     DCP in progress rehab vs home.     d/w patient,     Nathan Mora MD phone [phone]

## 2023-07-05 NOTE — DIETITIAN INITIAL EVALUATION ADULT - ENERGY INTAKE
Pt was on full liquid diet from 6/24-6/28, soft/bite size diet from 6/28- 7/2, puree since 7/2 as pt c/o swallowing difficulty. Flowsheet documents fair to good PO of foods provided. Pt states she does not like puree foods provided, observed eating ice cream at lunch time. Ordered for Ensure 3x daily since today 7/5.

## 2023-07-06 ENCOUNTER — TRANSCRIPTION ENCOUNTER (OUTPATIENT)
Age: 73
End: 2023-07-06

## 2023-07-06 VITALS — WEIGHT: 249.12 LBS

## 2023-07-06 PROCEDURE — 0225U NFCT DS DNA&RNA 21 SARSCOV2: CPT

## 2023-07-06 PROCEDURE — 97530 THERAPEUTIC ACTIVITIES: CPT

## 2023-07-06 PROCEDURE — 36415 COLL VENOUS BLD VENIPUNCTURE: CPT

## 2023-07-06 PROCEDURE — 85018 HEMOGLOBIN: CPT

## 2023-07-06 PROCEDURE — 84295 ASSAY OF SERUM SODIUM: CPT

## 2023-07-06 PROCEDURE — 84443 ASSAY THYROID STIM HORMONE: CPT

## 2023-07-06 PROCEDURE — 74220 X-RAY XM ESOPHAGUS 1CNTRST: CPT

## 2023-07-06 PROCEDURE — 84484 ASSAY OF TROPONIN QUANT: CPT

## 2023-07-06 PROCEDURE — 82565 ASSAY OF CREATININE: CPT

## 2023-07-06 PROCEDURE — 82550 ASSAY OF CK (CPK): CPT

## 2023-07-06 PROCEDURE — 85379 FIBRIN DEGRADATION QUANT: CPT

## 2023-07-06 PROCEDURE — 83690 ASSAY OF LIPASE: CPT

## 2023-07-06 PROCEDURE — 83605 ASSAY OF LACTIC ACID: CPT

## 2023-07-06 PROCEDURE — 93971 EXTREMITY STUDY: CPT

## 2023-07-06 PROCEDURE — 94640 AIRWAY INHALATION TREATMENT: CPT

## 2023-07-06 PROCEDURE — 72040 X-RAY EXAM NECK SPINE 2-3 VW: CPT

## 2023-07-06 PROCEDURE — 97161 PT EVAL LOW COMPLEX 20 MIN: CPT

## 2023-07-06 PROCEDURE — 85027 COMPLETE CBC AUTOMATED: CPT

## 2023-07-06 PROCEDURE — 84132 ASSAY OF SERUM POTASSIUM: CPT

## 2023-07-06 PROCEDURE — 83880 ASSAY OF NATRIURETIC PEPTIDE: CPT

## 2023-07-06 PROCEDURE — 87507 IADNA-DNA/RNA PROBE TQ 12-25: CPT

## 2023-07-06 PROCEDURE — 80048 BASIC METABOLIC PNL TOTAL CA: CPT

## 2023-07-06 PROCEDURE — 93005 ELECTROCARDIOGRAM TRACING: CPT

## 2023-07-06 PROCEDURE — 85014 HEMATOCRIT: CPT

## 2023-07-06 PROCEDURE — 84100 ASSAY OF PHOSPHORUS: CPT

## 2023-07-06 PROCEDURE — 82435 ASSAY OF BLOOD CHLORIDE: CPT

## 2023-07-06 PROCEDURE — 85025 COMPLETE CBC W/AUTO DIFF WBC: CPT

## 2023-07-06 PROCEDURE — 82803 BLOOD GASES ANY COMBINATION: CPT

## 2023-07-06 PROCEDURE — 82330 ASSAY OF CALCIUM: CPT

## 2023-07-06 PROCEDURE — 92526 ORAL FUNCTION THERAPY: CPT

## 2023-07-06 PROCEDURE — 71260 CT THORAX DX C+: CPT | Mod: MA

## 2023-07-06 PROCEDURE — 83735 ASSAY OF MAGNESIUM: CPT

## 2023-07-06 PROCEDURE — 97116 GAIT TRAINING THERAPY: CPT

## 2023-07-06 PROCEDURE — 72070 X-RAY EXAM THORAC SPINE 2VWS: CPT

## 2023-07-06 PROCEDURE — 92610 EVALUATE SWALLOWING FUNCTION: CPT

## 2023-07-06 PROCEDURE — 82553 CREATINE MB FRACTION: CPT

## 2023-07-06 PROCEDURE — 82962 GLUCOSE BLOOD TEST: CPT

## 2023-07-06 PROCEDURE — 80053 COMPREHEN METABOLIC PANEL: CPT

## 2023-07-06 PROCEDURE — 82947 ASSAY GLUCOSE BLOOD QUANT: CPT

## 2023-07-06 PROCEDURE — 99285 EMERGENCY DEPT VISIT HI MDM: CPT

## 2023-07-06 PROCEDURE — 71045 X-RAY EXAM CHEST 1 VIEW: CPT

## 2023-07-06 PROCEDURE — 97110 THERAPEUTIC EXERCISES: CPT

## 2023-07-06 PROCEDURE — C8929: CPT

## 2023-07-06 RX ORDER — SUCRALFATE 1 G
1 TABLET ORAL
Qty: 120 | Refills: 0
Start: 2023-07-06 | End: 2023-08-04

## 2023-07-06 RX ORDER — OXYCODONE AND ACETAMINOPHEN 5; 325 MG/1; MG/1
1 TABLET ORAL
Qty: 15 | Refills: 0
Start: 2023-07-06 | End: 2023-07-10

## 2023-07-06 RX ORDER — DILTIAZEM HCL 120 MG
60 CAPSULE, EXT RELEASE 24 HR ORAL EVERY 6 HOURS
Refills: 0 | Status: DISCONTINUED | OUTPATIENT
Start: 2023-07-06 | End: 2023-07-06

## 2023-07-06 RX ORDER — DILTIAZEM HCL 120 MG
1 CAPSULE, EXT RELEASE 24 HR ORAL
Qty: 120 | Refills: 0
Start: 2023-07-06 | End: 2023-08-04

## 2023-07-06 RX ORDER — AMITRIPTYLINE HCL 25 MG
1 TABLET ORAL
Qty: 30 | Refills: 0
Start: 2023-07-06 | End: 2023-08-04

## 2023-07-06 RX ADMIN — NYSTATIN CREAM 1 APPLICATION(S): 100000 CREAM TOPICAL at 05:22

## 2023-07-06 RX ADMIN — ISOSORBIDE DINITRATE 5 MILLIGRAM(S): 5 TABLET ORAL at 05:20

## 2023-07-06 RX ADMIN — TIOTROPIUM BROMIDE 2 PUFF(S): 18 CAPSULE ORAL; RESPIRATORY (INHALATION) at 11:07

## 2023-07-06 RX ADMIN — Medication 60 MILLIGRAM(S): at 14:05

## 2023-07-06 RX ADMIN — Medication 75 MILLIGRAM(S): at 05:19

## 2023-07-06 RX ADMIN — Medication 400 MILLIGRAM(S): at 11:07

## 2023-07-06 RX ADMIN — Medication 10 MILLIGRAM(S): at 05:19

## 2023-07-06 RX ADMIN — GABAPENTIN 300 MILLIGRAM(S): 400 CAPSULE ORAL at 05:20

## 2023-07-06 RX ADMIN — MONTELUKAST 10 MILLIGRAM(S): 4 TABLET, CHEWABLE ORAL at 11:03

## 2023-07-06 RX ADMIN — DULOXETINE HYDROCHLORIDE 60 MILLIGRAM(S): 30 CAPSULE, DELAYED RELEASE ORAL at 05:20

## 2023-07-06 RX ADMIN — PANTOPRAZOLE SODIUM 40 MILLIGRAM(S): 20 TABLET, DELAYED RELEASE ORAL at 05:23

## 2023-07-06 RX ADMIN — Medication 1 GRAM(S): at 05:18

## 2023-07-06 RX ADMIN — Medication 40 MILLIGRAM(S): at 05:20

## 2023-07-06 RX ADMIN — Medication 125 MICROGRAM(S): at 05:20

## 2023-07-06 RX ADMIN — AMLODIPINE BESYLATE 5 MILLIGRAM(S): 2.5 TABLET ORAL at 05:20

## 2023-07-06 RX ADMIN — OLANZAPINE 10 MILLIGRAM(S): 15 TABLET, FILM COATED ORAL at 11:03

## 2023-07-06 RX ADMIN — Medication 0.5 MILLIGRAM(S): at 12:29

## 2023-07-06 RX ADMIN — LOSARTAN POTASSIUM 100 MILLIGRAM(S): 100 TABLET, FILM COATED ORAL at 05:19

## 2023-07-06 RX ADMIN — Medication 1 TABLET(S): at 11:03

## 2023-07-06 RX ADMIN — Medication 1 GRAM(S): at 11:03

## 2023-07-06 NOTE — PROGRESS NOTE ADULT - SUBJECTIVE AND OBJECTIVE BOX
Cardiovascular Disease Progress Note    Overnight events: No acute events overnight.  cp improving. no new cardiac sx  Otherwise review of systems negative    Objective Findings:  T(C): 36.8 (23 @ 05:45), Max: 36.8 (23 @ 05:45)  HR: 72 (23 @ 05:45) (49 - 72)  BP: 110/77 (23 @ 05:45) (96/46 - 135/76)  RR: 18 (23 @ 05:45) (18 - 18)  SpO2: 96% (23 @ 05:45) (94% - 98%)  Wt(kg): --  Daily     Daily Weight in k.2 (2023 07:50)      Physical Exam:  Gen: NAD  HEENT: EOMI  CV: RRR, normal S1 + S2, no m/r/g  Lungs: CTAB  Abd: soft, non-tender  Ext: No edema    Telemetry:    Laboratory Data:                        13.3   7.89  )-----------( 147      ( 2023 06:43 )             43.0     -    142  |  96  |  12  ----------------------------<  111<H>  3.4<L>   |  33<H>  |  0.99    Ca    10.0      2023 06:43  Phos  3.6     06-27  Mg     1.8             CARDIAC MARKERS ( 2023 13:44 )  x     / x     / 28 U/L / x     / 1.4 ng/mL  CARDIAC MARKERS ( 2023 13:09 )  x     / x     / 37 U/L / x     / 1.5 ng/mL          Inpatient Medications:  MEDICATIONS  (STANDING):  amLODIPine   Tablet 5 milliGRAM(s) Oral daily  aspirin enteric coated 81 milliGRAM(s) Oral daily  atorvastatin 40 milliGRAM(s) Oral at bedtime  busPIRone 10 milliGRAM(s) Oral two times a day  diltiazem    Tablet 60 milliGRAM(s) Oral every 6 hours  DULoxetine 60 milliGRAM(s) Oral two times a day  enoxaparin Injectable 40 milliGRAM(s) SubCutaneous every 24 hours  furosemide    Tablet 40 milliGRAM(s) Oral daily  gabapentin 300 milliGRAM(s) Oral two times a day  hydrALAZINE 75 milliGRAM(s) Oral two times a day  levothyroxine 125 MICROGram(s) Oral daily  losartan 100 milliGRAM(s) Oral daily  montelukast 10 milliGRAM(s) Oral daily  OLANZapine 10 milliGRAM(s) Oral daily  pantoprazole  Injectable 40 milliGRAM(s) IV Push two times a day  pentoxifylline 400 milliGRAM(s) Oral daily  propranolol 20 milliGRAM(s) Oral two times a day  tiotropium 2.5 MICROgram(s) Inhaler 2 Puff(s) Inhalation daily      Assessment:  72-year-old female history of COPD on 2 L nasal cannula at home, Graves' disease, HLD, HTN, breast cancer, kidney cancer, skin cancer all in remission presenting with a chief complaint of chest pain x2 months.        Recs:  cardiac stable  no e/o acs by ecg or biomarkers  TTE mild diastolic dysfunction, normal lv/rv and valves  cta chest without evidence of aortic dissection/PE  vol status improving--> cw lasix 40mg po qd  rec compression socks, ace wraps and leg elevation  le giovanni duplex to r/o dvt  f/u GI recs re esophagram findings, started on ccb - reporting some improvement      Over 25 minutes spent on total encounter; more than 50% of the visit was spent counseling and/or coordinating care by the attending physician.      Juan Salcedo MD   Cardiovascular Disease  [phone]
Cardiovascular Disease Progress Note    Overnight events: No acute events overnight.  no new cardiac sx  Otherwise review of systems negative    Objective Findings:  T(C): 36.8 (23 @ 05:10), Max: 36.8 (23 @ 05:10)  HR: 80 (23 @ 05:10) (65 - 80)  BP: 132/75 (23 @ 05:10) (93/54 - 142/57)  RR: 18 (23 @ 05:10) (18 - 18)  SpO2: 95% (23 @ 05:10) (95% - 99%)  Wt(kg): --  Daily     Daily Weight in k.7 (2023 08:10)      Physical Exam:  Gen: NAD  HEENT: EOMI  CV: RRR, normal S1 + S2, no m/r/g  Lungs: CTAB  Abd: soft, non-tender  Ext: No edema    Telemetry:    Laboratory Data:                        13.5   10.42 )-----------( 146      ( 2023 09:04 )             42.4         144  |  98  |  8   ----------------------------<  139<H>  3.2<L>   |  34<H>  |  0.68    Ca    10.2      2023 06:47        CARDIAC MARKERS ( 2023 20:34 )  x     / x     / 32 U/L / x     / 1.2 ng/mL  CARDIAC MARKERS ( 2023 13:33 )  x     / x     / 37 U/L / x     / 1.3 ng/mL          Inpatient Medications:  MEDICATIONS  (STANDING):  amLODIPine   Tablet 5 milliGRAM(s) Oral daily  aspirin enteric coated 81 milliGRAM(s) Oral daily  atorvastatin 40 milliGRAM(s) Oral at bedtime  busPIRone 10 milliGRAM(s) Oral two times a day  DULoxetine 60 milliGRAM(s) Oral two times a day  enoxaparin Injectable 40 milliGRAM(s) SubCutaneous every 24 hours  furosemide   Injectable 40 milliGRAM(s) IV Push daily  gabapentin 300 milliGRAM(s) Oral two times a day  hydrALAZINE 75 milliGRAM(s) Oral two times a day  levothyroxine 125 MICROGram(s) Oral daily  losartan 100 milliGRAM(s) Oral daily  montelukast 10 milliGRAM(s) Oral daily  OLANZapine 10 milliGRAM(s) Oral daily  pantoprazole    Tablet 40 milliGRAM(s) Oral before breakfast  pentoxifylline 400 milliGRAM(s) Oral daily  propranolol 20 milliGRAM(s) Oral two times a day  tiotropium 2.5 MICROgram(s) Inhaler 2 Puff(s) Inhalation daily    CONCLUSIONS:      1. Normal left ventricular cavity size. The left ventricular wall thickness is normal. The left ventricular systolic function is normal with an ejection fraction of 71 % by Nagel's method of disks. There are no regional wall motion abnormalities seen.   2. There is mild (grade 1) left ventricular diastolic dysfunction.   3. Normal atria.   4. Normal right ventricular cavity size, normal right ventricular wall thickness and normal right ventricular systolic function.   5. No significant valvular disease.   6. Compared to the transthoracic echocardiogram performed on 3/22/2023 there have been no significant interval changes.      Assessment:  72-year-old female history of COPD on 2 L nasal cannula at home, Graves' disease, HLD, HTN, breast cancer, kidney cancer, skin cancer all in remission presenting with a chief complaint of chest pain x2 months.        Recs:  cardiac stable  no e/o acs by ecg or biomarkers  TTE mild diastolic dysfunction, normal lv/rv and valves  cta chest without evidence of aortic dissection/PE, d-dimer to be obtained as well  vol status improving--> cw IV lasix 40mg qd  rec compression socks, ace wraps and leg elevation  le giovanni duplex to r/o dvt  f/u GI recs           Over 25 minutes spent on total encounter; more than 50% of the visit was spent counseling and/or coordinating care by the attending physician.      Juan Salcedo MD   Cardiovascular Disease  [phone]
Cardiovascular Disease Progress Note    Overnight events: No acute events overnight.  no new cardiac sx  Otherwise review of systems negative    Objective Findings:  T(C): 37.7 (23 @ 06:00), Max: 37.7 (23 @ 06:00)  HR: 86 (23 @ 06:00) (66 - 86)  BP: 130/77 (23 @ 06:00) (97/62 - 142/80)  RR: 18 (23 @ 06:00) (18 - 18)  SpO2: 95% (23 @ 06:00) (93% - 98%)  Wt(kg): --  Daily     Daily Weight in k (2023 08:00)      Physical Exam:  Gen: NAD  HEENT: EOMI  CV: RRR, normal S1 + S2, no m/r/g  Lungs: CTAB  Abd: soft, non-tender  Ext: No edema    Telemetry:    Laboratory Data:                        13.3   7.89  )-----------( 147      ( 2023 06:43 )             43.0     06-28    142  |  96  |  12  ----------------------------<  111<H>  3.4<L>   |  33<H>  |  0.99    Ca    10.0      2023 06:43  Phos  3.6     06-27  Mg     1.8             CARDIAC MARKERS ( 2023 13:09 )  x     / x     / 37 U/L / x     / 1.5 ng/mL          Inpatient Medications:  MEDICATIONS  (STANDING):  amLODIPine   Tablet 5 milliGRAM(s) Oral daily  aspirin enteric coated 81 milliGRAM(s) Oral daily  atorvastatin 40 milliGRAM(s) Oral at bedtime  busPIRone 10 milliGRAM(s) Oral two times a day  diltiazem    Tablet 60 milliGRAM(s) Oral every 6 hours  DULoxetine 60 milliGRAM(s) Oral two times a day  enoxaparin Injectable 40 milliGRAM(s) SubCutaneous every 24 hours  furosemide    Tablet 40 milliGRAM(s) Oral daily  gabapentin 300 milliGRAM(s) Oral two times a day  hydrALAZINE 75 milliGRAM(s) Oral two times a day  levothyroxine 125 MICROGram(s) Oral daily  losartan 100 milliGRAM(s) Oral daily  montelukast 10 milliGRAM(s) Oral daily  OLANZapine 10 milliGRAM(s) Oral daily  pantoprazole  Injectable 40 milliGRAM(s) IV Push two times a day  pentoxifylline 400 milliGRAM(s) Oral daily  propranolol 20 milliGRAM(s) Oral two times a day  tiotropium 2.5 MICROgram(s) Inhaler 2 Puff(s) Inhalation daily      Assessment:  72-year-old female history of COPD on 2 L nasal cannula at home, Graves' disease, HLD, HTN, breast cancer, kidney cancer, skin cancer all in remission presenting with a chief complaint of chest pain x2 months.        Recs:  cardiac stable  no e/o acs by ecg or biomarkers  TTE mild diastolic dysfunction, normal lv/rv and valves  cta chest without evidence of aortic dissection/PE  vol status improving--> cw lasix 40mg po qd  rec compression socks, ace wraps and leg elevation  le giovanni duplex to r/o dvt  f/u GI recs re esophagram findings, started on ccb        Over 25 minutes spent on total encounter; more than 50% of the visit was spent counseling and/or coordinating care by the attending physician.      Juan Salcedo MD   Cardiovascular Disease  [phone]
[facility]  Daniel Marcus PA-C  [address] [place] [address]  [phone]      INTERVAL HPI/OVERNIGHT EVENTS  pt seen and examined,   Reports feeling better but still with chest pain and fear of chocking    Allergies    No Known Drug Allergies  Peaches (Hives)  shellfish (Hives)  Grapes (Hives)    Intolerances    MEDICATIONS  (STANDING):  amLODIPine   Tablet 5 milliGRAM(s) Oral daily  aspirin  chewable 81 milliGRAM(s) Oral daily  atorvastatin 40 milliGRAM(s) Oral at bedtime  busPIRone 10 milliGRAM(s) Oral two times a day  diltiazem    Tablet 60 milliGRAM(s) Oral every 6 hours  DULoxetine 60 milliGRAM(s) Oral two times a day  enoxaparin Injectable 40 milliGRAM(s) SubCutaneous every 24 hours  furosemide    Tablet 40 milliGRAM(s) Oral daily  gabapentin 300 milliGRAM(s) Oral two times a day  hydrALAZINE 75 milliGRAM(s) Oral two times a day  levothyroxine 125 MICROGram(s) Oral daily  losartan 100 milliGRAM(s) Oral daily  montelukast 10 milliGRAM(s) Oral daily  OLANZapine 10 milliGRAM(s) Oral daily  pantoprazole  Injectable 40 milliGRAM(s) IV Push two times a day  pentoxifylline 400 milliGRAM(s) Oral daily  propranolol 20 milliGRAM(s) Oral two times a day  sucralfate 1 Gram(s) Oral four times a day  tiotropium 2.5 MICROgram(s) Inhaler 2 Puff(s) Inhalation daily    MEDICATIONS  (PRN):  acetaminophen     Tablet .. 650 milliGRAM(s) Oral every 6 hours PRN Temp greater or equal to 38.5C (101.3F), Mild Pain (1 - 3)  albuterol    90 MICROgram(s) HFA Inhaler 2 Puff(s) Inhalation every 6 hours PRN Shortness of Breath and/or Wheezing  loperamide 2 milliGRAM(s) Oral two times a day PRN Diarrhea  LORazepam     Tablet 0.5 milliGRAM(s) Oral two times a day PRN Anxiety  melatonin 3 milliGRAM(s) Oral at bedtime PRN Insomnia  oxycodone    5 mG/acetaminophen 325 mG 1 Tablet(s) Oral every 6 hours PRN Moderate Pain (4 - 6)  atin 40 milliGRAM(s) Oral at bedtime            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      History of carpal tunnel release  right wrist      History of parathyroidectomy      S/P laminectomy  now bed and wheelchair ridden with severe pain    FAMILY HISTORY:  Family history of myocardial infarction  mother  at age 67 and father at age 67 of MI's    Family history of hypertension  mother and father    Family history of diabetes mellitus (Sibling)  siblings    Family history of heart disease (Sibling)  brother has a PPM    Family history of thyroid cancer (Child)  daughter has thyroid cancer      Social History:  Social History:    Marital Status:  (x   )    (   ) Single    (   )    (  )   Occupation:   Lives with: (  ) alone  (  ) children   ( x ) spouse   (  ) parents  (  ) other    Substance Use (street drugs): ( x ) never used  (  ) other:  Tobacco Usage:  (  x ) never smoked   (   ) former smoker   (   ) current smoker  (     ) pack years  (        ) last cigarette date  Alcohol Usage: no    (     ) Advanced Directives: (     ) None    (      ) DNR    (     ) DNI    (     ) Health Care Proxy: (2023 11:57)    ROS:     General:  No wt loss, fevers, chills, night sweats, fatigue,   Eyes:  Good vision, no reported pain  ENT:  No sore throat, pain, + throat dryness,  runny nose, +dysphagia  CV:  No pain, palpitations, hypo/hypertension  Resp:  No dyspnea, cough, tachypnea,   GI:  No pain, No nausea, No vomiting, +diarrhea IBS, No constipation, No weight loss, No fever, No pruritis, No rectal bleeding, No tarry stools, +dysphagia   :  No pain, bleeding, incontinence, nocturia  Muscle:  no  pain,   Neuro:  No weakness, tingling, memory problems  Psych:  No fatigue, insomnia, mood problems, depression  Endocrine:  No polyuria, polydipsia, cold/heat intolerance  Heme:  No petechiae, ecchymosis, easy bruisability  Skin:  No rash, scars, edema    Physical Exam :  Vital Signs Last 24 Hrs  T(C): 36.4 (2023 04:04), Max: 36.8 (2023 20:26)  T(F): 97.5 (2023 04:04), Max: 98.2 (2023 20:26)  HR: 56 (2023 04:04) (55 - 69)  BP: 148/68 (2023 04:04) (108/61 - 148/68)  BP(mean): --  RR: 18 (2023 04:04) (18 - 20)  SpO2: 94% (2023 04:04) (94% - 97%)    Parameters below as of 2023 04:04  Patient On (Oxygen Delivery Method): nasal cannula  O2 Flow (L/min): 2    Daily Height in cm: 152.4 (2023 06:04)    Daily     GENERAL:  Appears stated age,   HEENT:  NC/AT,    CHEST:  Full & symmetric excursion,   HEART:  Regular rhythm  ABDOMEN:  Soft, non-tender, non-distended,   EXTEREMITIES:  no cyanosis,clubbing or edema  SKIN:  No rash  NEURO:  Alert,    LABS:             07-    135  |  92<L>  |  18  ----------------------------<  112<H>  3.7   |  29  |  1.27    Ca    10.1      2023 06:19  Mg     1.6     07-01        LIVER FUNCTIONS - ( 2023 06:52 )  Alb: 3.7 g/dL / Pro: 6.5 g/dL / ALK PHOS: 86 U/L / ALT: 13 U/L / AST: 12 U/L / GGT: x         Urinalysis Basic - ( 2023 06:52 )    Color: x / Appearance: x / SG: x / pH: x  Gluc: 124 mg/dL / Ketone: x  / Bili: x / Urobili: x   Blood: x / Protein: x / Nitrite: x   Leuk Esterase: x / RBC: x / WBC x   Sq Epi: x / Non Sq Epi: x / Bacteria: x  Imaging:    
[facility]  Daniel Marcus PA-C  [address] [place] [address]  [phone]      INTERVAL HPI/OVERNIGHT EVENTS  pt seen and examined, no new events  no chest pain today   still afraid to eat    Allergies    No Known Drug Allergies  Peaches (Hives)  shellfish (Hives)  Grapes (Hives)    Intolerances    MEDICATIONS  (STANDING):  amLODIPine   Tablet 5 milliGRAM(s) Oral daily  aspirin  chewable 81 milliGRAM(s) Oral daily  atorvastatin 40 milliGRAM(s) Oral at bedtime  busPIRone 10 milliGRAM(s) Oral two times a day  diltiazem    Tablet 60 milliGRAM(s) Oral every 6 hours  DULoxetine 60 milliGRAM(s) Oral two times a day  enoxaparin Injectable 40 milliGRAM(s) SubCutaneous every 24 hours  furosemide    Tablet 40 milliGRAM(s) Oral daily  gabapentin 300 milliGRAM(s) Oral two times a day  hydrALAZINE 75 milliGRAM(s) Oral two times a day  levothyroxine 125 MICROGram(s) Oral daily  losartan 100 milliGRAM(s) Oral daily  montelukast 10 milliGRAM(s) Oral daily  OLANZapine 10 milliGRAM(s) Oral daily  pantoprazole  Injectable 40 milliGRAM(s) IV Push two times a day  pentoxifylline 400 milliGRAM(s) Oral daily  propranolol 20 milliGRAM(s) Oral two times a day  sucralfate 1 Gram(s) Oral four times a day  tiotropium 2.5 MICROgram(s) Inhaler 2 Puff(s) Inhalation daily    MEDICATIONS  (PRN):  acetaminophen     Tablet .. 650 milliGRAM(s) Oral every 6 hours PRN Temp greater or equal to 38.5C (101.3F), Mild Pain (1 - 3)  albuterol    90 MICROgram(s) HFA Inhaler 2 Puff(s) Inhalation every 6 hours PRN Shortness of Breath and/or Wheezing  loperamide 2 milliGRAM(s) Oral two times a day PRN Diarrhea  LORazepam     Tablet 0.5 milliGRAM(s) Oral two times a day PRN Anxiety  melatonin 3 milliGRAM(s) Oral at bedtime PRN Insomnia  oxycodone    5 mG/acetaminophen 325 mG 1 Tablet(s) Oral every 6 hours PRN Moderate Pain (4 - 6)  atin 40 milliGRAM(s) Oral at bedtime            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      History of carpal tunnel release  right wrist      History of parathyroidectomy      S/P laminectomy  now bed and wheelchair ridden with severe pain    FAMILY HISTORY:  Family history of myocardial infarction  mother  at age 67 and father at age 67 of MI's    Family history of hypertension  mother and father    Family history of diabetes mellitus (Sibling)  siblings    Family history of heart disease (Sibling)  brother has a PPM    Family history of thyroid cancer (Child)  daughter has thyroid cancer      Social History:  Social History:    Marital Status:  (x   )    (   ) Single    (   )    (  )   Occupation:   Lives with: (  ) alone  (  ) children   ( x ) spouse   (  ) parents  (  ) other    Substance Use (street drugs): ( x ) never used  (  ) other:  Tobacco Usage:  (  x ) never smoked   (   ) former smoker   (   ) current smoker  (     ) pack years  (        ) last cigarette date  Alcohol Usage: no    (     ) Advanced Directives: (     ) None    (      ) DNR    (     ) DNI    (     ) Health Care Proxy: (2023 11:57)    ROS:     General:  No wt loss, fevers, chills, night sweats, fatigue,   Eyes:  Good vision, no reported pain  ENT:  No sore throat, pain, + throat dryness,  runny nose, +dysphagia  CV:  No pain, palpitations, hypo/hypertension  Resp:  No dyspnea, cough, tachypnea,   GI:  No pain, No nausea, No vomiting, +diarrhea IBS, No constipation, No weight loss, No fever, No pruritis, No rectal bleeding, No tarry stools, +dysphagia   :  No pain, bleeding, incontinence, nocturia  Muscle:  no  pain,   Neuro:  No weakness, tingling, memory problems  Psych:  No fatigue, insomnia, mood problems, depression  Endocrine:  No polyuria, polydipsia, cold/heat intolerance  Heme:  No petechiae, ecchymosis, easy bruisability  Skin:  No rash, scars, edema    Physical Exam :  Vital Signs Last 24 Hrs  T(C): 36.4 (2023 05:22), Max: 36.9 (2023 11:49)  T(F): 97.6 (2023 05:22), Max: 98.4 (2023 11:49)  HR: 58 (2023 05:22) (58 - 80)  BP: 147/71 (2023 05:22) (110/66 - 147/71)  BP(mean): --  RR: 18 (2023 05:22) (18 - 20)  SpO2: 97% (2023 05:22) (95% - 97%)    Parameters below as of 2023 05:22  Patient On (Oxygen Delivery Method): nasal cannula  O2 Flow (L/min): 2    Daily Height in cm: 152.4 (2023 06:04)    Daily     GENERAL:  Appears stated age,   HEENT:  NC/AT,    CHEST:  Full & symmetric excursion,   HEART:  Regular rhythm  ABDOMEN:  Soft, non-tender, non-distended,   EXTEREMITIES:  no cyanosis,clubbing or edema  SKIN:  No rash  NEURO:  Alert,    LABS:        LIVER FUNCTIONS - ( 2023 06:52 )  Alb: 3.7 g/dL / Pro: 6.5 g/dL / ALK PHOS: 86 U/L / ALT: 13 U/L / AST: 12 U/L / GGT: x         Urinalysis Basic - ( 2023 06:52 )    Color: x / Appearance: x / SG: x / pH: x  Gluc: 124 mg/dL / Ketone: x  / Bili: x / Urobili: x   Blood: x / Protein: x / Nitrite: x   Leuk Esterase: x / RBC: x / WBC x   Sq Epi: x / Non Sq Epi: x / Bacteria: x  Imaging:    
[facility]  Daniel Marcus PA-C  [address] [place] [id number] [phone]      INTERVAL HPI/OVERNIGHT EVENTS  pt seen and examined, no new events  had no spasms yesterday  c/o chest tightness this am, afraid to eat    Allergies    No Known Drug Allergies  Peaches (Hives)  shellfish (Hives)  Grapes (Hives)    Intolerances    MEDICATIONS  (STANDING):  amLODIPine   Tablet 5 milliGRAM(s) Oral daily  aspirin  chewable 81 milliGRAM(s) Oral daily  atorvastatin 40 milliGRAM(s) Oral at bedtime  busPIRone 10 milliGRAM(s) Oral two times a day  diltiazem    Tablet 60 milliGRAM(s) Oral every 6 hours  DULoxetine 60 milliGRAM(s) Oral two times a day  enoxaparin Injectable 40 milliGRAM(s) SubCutaneous every 24 hours  furosemide    Tablet 40 milliGRAM(s) Oral daily  gabapentin 300 milliGRAM(s) Oral two times a day  hydrALAZINE 75 milliGRAM(s) Oral two times a day  levothyroxine 125 MICROGram(s) Oral daily  losartan 100 milliGRAM(s) Oral daily  montelukast 10 milliGRAM(s) Oral daily  OLANZapine 10 milliGRAM(s) Oral daily  pantoprazole  Injectable 40 milliGRAM(s) IV Push two times a day  pentoxifylline 400 milliGRAM(s) Oral daily  propranolol 20 milliGRAM(s) Oral two times a day  sucralfate 1 Gram(s) Oral four times a day  tiotropium 2.5 MICROgram(s) Inhaler 2 Puff(s) Inhalation daily    MEDICATIONS  (PRN):  acetaminophen     Tablet .. 650 milliGRAM(s) Oral every 6 hours PRN Temp greater or equal to 38.5C (101.3F), Mild Pain (1 - 3)  albuterol    90 MICROgram(s) HFA Inhaler 2 Puff(s) Inhalation every 6 hours PRN Shortness of Breath and/or Wheezing  loperamide 2 milliGRAM(s) Oral two times a day PRN Diarrhea  LORazepam     Tablet 0.5 milliGRAM(s) Oral two times a day PRN Anxiety  melatonin 3 milliGRAM(s) Oral at bedtime PRN Insomnia  oxycodone    5 mG/acetaminophen 325 mG 1 Tablet(s) Oral every 6 hours PRN Moderate Pain (4 - 6)  atin 40 milliGRAM(s) Oral at bedtime            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      History of carpal tunnel release  right wrist      History of parathyroidectomy      S/P laminectomy  now bed and wheelchair ridden with severe pain    FAMILY HISTORY:  Family history of myocardial infarction  mother  at age 67 and father at age 67 of MI's    Family history of hypertension  mother and father    Family history of diabetes mellitus (Sibling)  siblings    Family history of heart disease (Sibling)  brother has a PPM    Family history of thyroid cancer (Child)  daughter has thyroid cancer      Social History:  Social History:    Marital Status:  (x   )    (   ) Single    (   )    (  )   Occupation:   Lives with: (  ) alone  (  ) children   ( x ) spouse   (  ) parents  (  ) other    Substance Use (street drugs): ( x ) never used  (  ) other:  Tobacco Usage:  (  x ) never smoked   (   ) former smoker   (   ) current smoker  (     ) pack years  (        ) last cigarette date  Alcohol Usage: no    (     ) Advanced Directives: (     ) None    (      ) DNR    (     ) DNI    (     ) Health Care Proxy: (2023 11:57)    ROS:     General:  No wt loss, fevers, chills, night sweats, fatigue,   Eyes:  Good vision, no reported pain  ENT:  No sore throat, pain, + throat dryness,  runny nose, +dysphagia  CV:  No pain, palpitations, hypo/hypertension  Resp:  No dyspnea, cough, tachypnea,   GI:  No pain, No nausea, No vomiting, +diarrhea IBS, No constipation, No weight loss, No fever, No pruritis, No rectal bleeding, No tarry stools, +dysphagia   :  No pain, bleeding, incontinence, nocturia  Muscle:  no  pain,   Neuro:  No weakness, tingling, memory problems  Psych:  No fatigue, insomnia, mood problems, depression  Endocrine:  No polyuria, polydipsia, cold/heat intolerance  Heme:  No petechiae, ecchymosis, easy bruisability  Skin:  No rash, scars, edema    Physical Exam :  Vital Signs Last 24 Hrs  T(C): 36.7 (2023 04:14), Max: 36.8 (2023 19:58)  T(F): 98 (2023 04:14), Max: 98.3 (2023 19:58)  HR: 57 (2023 04:14) (57 - 80)  BP: 125/54 (2023 04:14) (109/72 - 132/67)  BP(mean): --  RR: 20 (2023 04:14) (18 - 20)  SpO2: 99% (2023 04:14) (95% - 99%)    Parameters below as of 2023 04:14  Patient On (Oxygen Delivery Method): nasal cannula  O2 Flow (L/min): 2    Daily Height in cm: 152.4 (2023 06:04)    Daily     GENERAL:  Appears stated age,   HEENT:  NC/AT,    CHEST:  Full & symmetric excursion,   HEART:  Regular rhythm  ABDOMEN:  Soft, non-tender, non-distended,   EXTEREMITIES:  no cyanosis,clubbing or edema  SKIN:  No rash  NEURO:  Alert,    LABS:        LIVER FUNCTIONS - ( 2023 06:52 )  Alb: 3.7 g/dL / Pro: 6.5 g/dL / ALK PHOS: 86 U/L / ALT: 13 U/L / AST: 12 U/L / GGT: x         Urinalysis Basic - ( 2023 06:52 )    Color: x / Appearance: x / SG: x / pH: x  Gluc: 124 mg/dL / Ketone: x  / Bili: x / Urobili: x   Blood: x / Protein: x / Nitrite: x   Leuk Esterase: x / RBC: x / WBC x   Sq Epi: x / Non Sq Epi: x / Bacteria: x  Imaging:    
[facility]  Daniel Marcus PA-C  [address] [place] [id number] [phone]      INTERVAL HPI/OVERNIGHT EVENTS  pt seen and examined, no new events  still with some chest tightness but improving  states she is afraid to eat because she is afraid the food will get stuck, explained to her there was no obstruction on esophagram and the medication we have her on should help with the esophageal contractions making it easier for her to eat.      Allergies    No Known Drug Allergies  Peaches (Hives)  shellfish (Hives)  Grapes (Hives)    Intolerances      MEDICATIONS  (STANDING):  amLODIPine   Tablet 5 milliGRAM(s) Oral daily  aspirin enteric coated 81 milliGRAM(s) Oral daily  atorvastatin 40 milliGRAM(s) Oral at bedtime  busPIRone 10 milliGRAM(s) Oral two times a day  DULoxetine 60 milliGRAM(s) Oral two times a day  enoxaparin Injectable 40 milliGRAM(s) SubCutaneous every 24 hours  furosemide   Injectable 40 milliGRAM(s) IV Push daily  gabapentin 300 milliGRAM(s) Oral two times a day  hydrALAZINE 75 milliGRAM(s) Oral two times a day  levothyroxine 125 MICROGram(s) Oral daily  losartan 100 milliGRAM(s) Oral daily  montelukast 10 milliGRAM(s) Oral daily  OLANZapine 10 milliGRAM(s) Oral daily  pantoprazole    Tablet 40 milliGRAM(s) Oral before breakfast  pentoxifylline 400 milliGRAM(s) Oral daily  propranolol 20 milliGRAM(s) Oral two times a day  tiotropium 2.5 MICROgram(s) Inhaler 2 Puff(s) Inhalation daily    MEDICATIONS  (PRN):  acetaminophen     Tablet .. 650 milliGRAM(s) Oral every 6 hours PRN Temp greater or equal to 38.5C (101.3F), Mild Pain (1 - 3)  melatonin 3 milliGRAM(s) Oral at bedtime PRN Insomnia  oxycodone    5 mG/acetaminophen 325 mG 1 Tablet(s) Oral every 6 hours PRN Moderate Pain (4 - 6)            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      History of carpal tunnel release  right wrist      History of parathyroidectomy      S/P laminectomy  now bed and wheelchair ridden with severe pain    FAMILY HISTORY:  Family history of myocardial infarction  mother  at age 67 and father at age 67 of MI's    Family history of hypertension  mother and father    Family history of diabetes mellitus (Sibling)  siblings    Family history of heart disease (Sibling)  brother has a PPM    Family history of thyroid cancer (Child)  daughter has thyroid cancer      Social History:  Social History:    Marital Status:  (x   )    (   ) Single    (   )    (  )   Occupation:   Lives with: (  ) alone  (  ) children   ( x ) spouse   (  ) parents  (  ) other    Substance Use (street drugs): ( x ) never used  (  ) other:  Tobacco Usage:  (  x ) never smoked   (   ) former smoker   (   ) current smoker  (     ) pack years  (        ) last cigarette date  Alcohol Usage: no    (     ) Advanced Directives: (     ) None    (      ) DNR    (     ) DNI    (     ) Health Care Proxy: (2023 11:57)    ROS:     General:  No wt loss, fevers, chills, night sweats, fatigue,   Eyes:  Good vision, no reported pain  ENT:  No sore throat, pain, + throat dryness,  runny nose, +dysphagia  CV:  No pain, palpitations, hypo/hypertension  Resp:  No dyspnea, cough, tachypnea,   GI:  No pain, No nausea, No vomiting, +diarrhea IBS, No constipation, No weight loss, No fever, No pruritis, No rectal bleeding, No tarry stools, +dysphagia   :  No pain, bleeding, incontinence, nocturia  Muscle:  no  pain,   Neuro:  No weakness, tingling, memory problems  Psych:  No fatigue, insomnia, mood problems, depression  Endocrine:  No polyuria, polydipsia, cold/heat intolerance  Heme:  No petechiae, ecchymosis, easy bruisability  Skin:  No rash, scars, edema    Physical Exam :  Vital Signs Last 24 Hrs  T(C): 36.8 (2023 05:45), Max: 36.8 (2023 05:45)  T(F): 98.2 (2023 05:45), Max: 98.2 (2023 05:45)  HR: 72 (2023 05:45) (49 - 72)  BP: 110/77 (2023 05:45) (96/46 - 135/76)  BP(mean): 63 (2023 20:58) (63 - 63)  RR: 18 (2023 05:45) (18 - 18)  SpO2: 96% (2023 05:45) (94% - 98%)    Parameters below as of 2023 05:45  Patient On (Oxygen Delivery Method): nasal cannula  O2 Flow (L/min): 2      Daily Height in cm: 152.4 (2023 06:04)    Daily     GENERAL:  Appears stated age,   HEENT:  NC/AT,    CHEST:  Full & symmetric excursion,   HEART:  Regular rhythm  ABDOMEN:  Soft, non-tender, non-distended,   EXTEREMITIES:  no cyanosis,clubbing or edema  SKIN:  No rash  NEURO:  Alert,    LABS:                        13.3   7.89  )-----------( 147      ( 2023 06:43 )             43.0   06-29    140  |  97  |  13  ----------------------------<  120<H>  3.4<L>   |  29  |  1.02    Ca    9.5      2023 07:06  Phos  3.6     06-27  Mg     1.6     06-29        LIVER FUNCTIONS - ( 2023 06:52 )  Alb: 3.7 g/dL / Pro: 6.5 g/dL / ALK PHOS: 86 U/L / ALT: 13 U/L / AST: 12 U/L / GGT: x         Urinalysis Basic - ( 2023 06:52 )    Color: x / Appearance: x / SG: x / pH: x  Gluc: 124 mg/dL / Ketone: x  / Bili: x / Urobili: x   Blood: x / Protein: x / Nitrite: x   Leuk Esterase: x / RBC: x / WBC x   Sq Epi: x / Non Sq Epi: x / Bacteria: x  Imaging:    
[facility]  Daniel Marcus PA-C  [address] [place] [address]  [phone]      INTERVAL HPI/OVERNIGHT EVENTS  pt seen and examined,   Reports feeling better but still with chest pain and fear of chocking    Allergies    No Known Drug Allergies  Peaches (Hives)  shellfish (Hives)  Grapes (Hives)    Intolerances    MEDICATIONS  (STANDING):  amLODIPine   Tablet 5 milliGRAM(s) Oral daily  aspirin  chewable 81 milliGRAM(s) Oral daily  atorvastatin 40 milliGRAM(s) Oral at bedtime  busPIRone 10 milliGRAM(s) Oral two times a day  diltiazem    Tablet 60 milliGRAM(s) Oral every 6 hours  DULoxetine 60 milliGRAM(s) Oral two times a day  enoxaparin Injectable 40 milliGRAM(s) SubCutaneous every 24 hours  furosemide    Tablet 40 milliGRAM(s) Oral daily  gabapentin 300 milliGRAM(s) Oral two times a day  hydrALAZINE 75 milliGRAM(s) Oral two times a day  levothyroxine 125 MICROGram(s) Oral daily  losartan 100 milliGRAM(s) Oral daily  montelukast 10 milliGRAM(s) Oral daily  OLANZapine 10 milliGRAM(s) Oral daily  pantoprazole  Injectable 40 milliGRAM(s) IV Push two times a day  pentoxifylline 400 milliGRAM(s) Oral daily  propranolol 20 milliGRAM(s) Oral two times a day  sucralfate 1 Gram(s) Oral four times a day  tiotropium 2.5 MICROgram(s) Inhaler 2 Puff(s) Inhalation daily    MEDICATIONS  (PRN):  acetaminophen     Tablet .. 650 milliGRAM(s) Oral every 6 hours PRN Temp greater or equal to 38.5C (101.3F), Mild Pain (1 - 3)  albuterol    90 MICROgram(s) HFA Inhaler 2 Puff(s) Inhalation every 6 hours PRN Shortness of Breath and/or Wheezing  loperamide 2 milliGRAM(s) Oral two times a day PRN Diarrhea  LORazepam     Tablet 0.5 milliGRAM(s) Oral two times a day PRN Anxiety  melatonin 3 milliGRAM(s) Oral at bedtime PRN Insomnia  oxycodone    5 mG/acetaminophen 325 mG 1 Tablet(s) Oral every 6 hours PRN Moderate Pain (4 - 6)  atin 40 milliGRAM(s) Oral at bedtime            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      History of carpal tunnel release  right wrist      History of parathyroidectomy      S/P laminectomy  now bed and wheelchair ridden with severe pain    FAMILY HISTORY:  Family history of myocardial infarction  mother  at age 67 and father at age 67 of MI's    Family history of hypertension  mother and father    Family history of diabetes mellitus (Sibling)  siblings    Family history of heart disease (Sibling)  brother has a PPM    Family history of thyroid cancer (Child)  daughter has thyroid cancer      Social History:  Social History:    Marital Status:  (x   )    (   ) Single    (   )    (  )   Occupation:   Lives with: (  ) alone  (  ) children   ( x ) spouse   (  ) parents  (  ) other    Substance Use (street drugs): ( x ) never used  (  ) other:  Tobacco Usage:  (  x ) never smoked   (   ) former smoker   (   ) current smoker  (     ) pack years  (        ) last cigarette date  Alcohol Usage: no    (     ) Advanced Directives: (     ) None    (      ) DNR    (     ) DNI    (     ) Health Care Proxy: (2023 11:57)    ROS:     General:  No wt loss, fevers, chills, night sweats, fatigue,   Eyes:  Good vision, no reported pain  ENT:  No sore throat, pain, + throat dryness,  runny nose, +dysphagia  CV:  No pain, palpitations, hypo/hypertension  Resp:  No dyspnea, cough, tachypnea,   GI:  No pain, No nausea, No vomiting, +diarrhea IBS, No constipation, No weight loss, No fever, No pruritis, No rectal bleeding, No tarry stools, +dysphagia   :  No pain, bleeding, incontinence, nocturia  Muscle:  no  pain,   Neuro:  No weakness, tingling, memory problems  Psych:  No fatigue, insomnia, mood problems, depression  Endocrine:  No polyuria, polydipsia, cold/heat intolerance  Heme:  No petechiae, ecchymosis, easy bruisability  Skin:  No rash, scars, edema    Physical Exam :  Vital Signs Last 24 Hrs  T(C): 36.4 (2023 04:04), Max: 36.8 (2023 20:26)  T(F): 97.5 (2023 04:04), Max: 98.2 (2023 20:26)  HR: 56 (2023 04:04) (55 - 69)  BP: 148/68 (2023 04:04) (108/61 - 148/68)  BP(mean): --  RR: 18 (2023 04:04) (18 - 20)  SpO2: 94% (2023 04:04) (94% - 97%)    Parameters below as of 2023 04:04  Patient On (Oxygen Delivery Method): nasal cannula  O2 Flow (L/min): 2    Daily Height in cm: 152.4 (2023 06:04)    Daily     GENERAL:  Appears stated age,   HEENT:  NC/AT,    CHEST:  Full & symmetric excursion,   HEART:  Regular rhythm  ABDOMEN:  Soft, non-tender, non-distended,   EXTEREMITIES:  no cyanosis,clubbing or edema  SKIN:  No rash  NEURO:  Alert,    LABS:             07-    135  |  92<L>  |  18  ----------------------------<  112<H>  3.7   |  29  |  1.27    Ca    10.1      2023 06:19  Mg     1.6     07-01        LIVER FUNCTIONS - ( 2023 06:52 )  Alb: 3.7 g/dL / Pro: 6.5 g/dL / ALK PHOS: 86 U/L / ALT: 13 U/L / AST: 12 U/L / GGT: x         Urinalysis Basic - ( 2023 06:52 )    Color: x / Appearance: x / SG: x / pH: x  Gluc: 124 mg/dL / Ketone: x  / Bili: x / Urobili: x   Blood: x / Protein: x / Nitrite: x   Leuk Esterase: x / RBC: x / WBC x   Sq Epi: x / Non Sq Epi: x / Bacteria: x  Imaging:    
Patient is a 72y old  Female who presents with a chief complaint of     SUBJECTIVE / OVERNIGHT EVENTS: Comfortable without new complaints.   Review of Systems  chest pain no  palpitations no  sob no  nausea no  headache no    MEDICATIONS  (STANDING):  amLODIPine   Tablet 5 milliGRAM(s) Oral daily  aspirin enteric coated 81 milliGRAM(s) Oral daily  atorvastatin 40 milliGRAM(s) Oral at bedtime  busPIRone 10 milliGRAM(s) Oral two times a day  DULoxetine 60 milliGRAM(s) Oral two times a day  enoxaparin Injectable 40 milliGRAM(s) SubCutaneous every 24 hours  furosemide    Tablet 40 milliGRAM(s) Oral daily  gabapentin 300 milliGRAM(s) Oral two times a day  hydrALAZINE 75 milliGRAM(s) Oral two times a day  levothyroxine 125 MICROGram(s) Oral daily  losartan 100 milliGRAM(s) Oral daily  montelukast 10 milliGRAM(s) Oral daily  OLANZapine 10 milliGRAM(s) Oral daily  pantoprazole  Injectable 40 milliGRAM(s) IV Push two times a day  pentoxifylline 400 milliGRAM(s) Oral daily  propranolol 20 milliGRAM(s) Oral two times a day  tiotropium 2.5 MICROgram(s) Inhaler 2 Puff(s) Inhalation daily    MEDICATIONS  (PRN):  acetaminophen     Tablet .. 650 milliGRAM(s) Oral every 6 hours PRN Temp greater or equal to 38.5C (101.3F), Mild Pain (1 - 3)  loperamide 2 milliGRAM(s) Oral two times a day PRN Diarrhea  melatonin 3 milliGRAM(s) Oral at bedtime PRN Insomnia  oxycodone    5 mG/acetaminophen 325 mG 1 Tablet(s) Oral every 6 hours PRN Moderate Pain (4 - 6)      Vital Signs Last 24 Hrs  T(C): 36.8 (27 Jun 2023 06:24), Max: 36.8 (27 Jun 2023 06:24)  T(F): 98.2 (27 Jun 2023 06:24), Max: 98.2 (27 Jun 2023 06:24)  HR: 68 (27 Jun 2023 06:24) (64 - 73)  BP: 118/74 (27 Jun 2023 06:24) (118/71 - 150/81)  BP(mean): --  RR: 18 (27 Jun 2023 06:24) (18 - 18)  SpO2: 95% (27 Jun 2023 06:24) (94% - 98%)    Parameters below as of 27 Jun 2023 06:24  Patient On (Oxygen Delivery Method): nasal cannula  O2 Flow (L/min): 2      PHYSICAL EXAM:  GENERAL: NAD  HEAD:  Atraumatic, Normocephalic  EYES: EOMI, PERRLA, conjunctiva and sclera clear  NECK: Supple, No JVD  CHEST/LUNG: Clear to auscultation bilaterally; No wheeze  HEART: Regular rate and rhythm; No murmurs, rubs, or gallops  ABDOMEN: Soft, Nontender, Nondistended; Bowel sounds present  EXTREMITIES:  2+bipedal edema less erythema   PSYCH: AAOx3   NEUROLOGY: non-focal  SKIN: No rashes or lesions    LABS:    06-27    140  |  98  |  12  ----------------------------<  129<H>  3.9   |  25  |  0.94    Ca    10.1      27 Jun 2023 09:04  Mg     1.8     06-27            Urinalysis Basic - ( 27 Jun 2023 09:04 )    Color: x / Appearance: x / SG: x / pH: x  Gluc: 129 mg/dL / Ketone: x  / Bili: x / Urobili: x   Blood: x / Protein: x / Nitrite: x   Leuk Esterase: x / RBC: x / WBC x   Sq Epi: x / Non Sq Epi: x / Bacteria: x          RADIOLOGY & ADDITIONAL TESTS:    Imaging Personally Reviewed:  < from: Xray Esophagram Single Contrast (06.26.23 @ 16:27) >    IMPRESSION:  Extensive esophageal dysmotility with tertiary contractions consistent   with dysmotility.  .    < end of copied text >  < from: Xray Cervical Spine AP and Lateral Only (06.24.23 @ 13:23) >  dontoid process is intact in lateral projection.    IMPRESSION:  Limited study. No acute bony pathology.    < end of copied text >  < from: Xray Thoracic Spine 1 View (06.24.23 @ 13:21) >  IMPRESSION:  Similar postoperative changes.    < end of copied text >    Consultant(s) Notes Reviewed:      Care Discussed with Consultants/Other Providers:  
Patient is a 72y old  Female who presents with a chief complaint of     SUBJECTIVE / OVERNIGHT EVENTS: Comfortable without new complaints.   Review of Systems  chest pain no  palpitations no  sob no  nausea no  headache no    MEDICATIONS  (STANDING):  amLODIPine   Tablet 5 milliGRAM(s) Oral daily  aspirin enteric coated 81 milliGRAM(s) Oral daily  atorvastatin 40 milliGRAM(s) Oral at bedtime  busPIRone 10 milliGRAM(s) Oral two times a day  DULoxetine 60 milliGRAM(s) Oral two times a day  enoxaparin Injectable 40 milliGRAM(s) SubCutaneous every 24 hours  furosemide   Injectable 40 milliGRAM(s) IV Push daily  gabapentin 300 milliGRAM(s) Oral two times a day  hydrALAZINE 75 milliGRAM(s) Oral two times a day  levothyroxine 125 MICROGram(s) Oral daily  losartan 100 milliGRAM(s) Oral daily  montelukast 10 milliGRAM(s) Oral daily  OLANZapine 10 milliGRAM(s) Oral daily  pantoprazole    Tablet 40 milliGRAM(s) Oral before breakfast  pentoxifylline 400 milliGRAM(s) Oral daily  propranolol 20 milliGRAM(s) Oral two times a day  tiotropium 2.5 MICROgram(s) Inhaler 2 Puff(s) Inhalation daily    MEDICATIONS  (PRN):  acetaminophen     Tablet .. 650 milliGRAM(s) Oral every 6 hours PRN Temp greater or equal to 38.5C (101.3F), Mild Pain (1 - 3)  melatonin 3 milliGRAM(s) Oral at bedtime PRN Insomnia  oxycodone    5 mG/acetaminophen 325 mG 1 Tablet(s) Oral every 6 hours PRN Moderate Pain (4 - 6)      Vital Signs Last 24 Hrs  T(C): 36.7 (25 Jun 2023 19:28), Max: 36.7 (25 Jun 2023 11:47)  T(F): 98.1 (25 Jun 2023 19:28), Max: 98.1 (25 Jun 2023 11:47)  HR: 66 (25 Jun 2023 19:28) (56 - 80)  BP: 130/71 (25 Jun 2023 19:28) (93/54 - 176/83)  BP(mean): --  RR: 18 (25 Jun 2023 19:28) (18 - 18)  SpO2: 96% (25 Jun 2023 19:28) (93% - 99%)    Parameters below as of 25 Jun 2023 19:28  Patient On (Oxygen Delivery Method): nasal cannula  O2 Flow (L/min): 2      PHYSICAL EXAM:  GENERAL: NAD, well-developed  HEAD:  Atraumatic, Normocephalic  EYES: EOMI, PERRLA, conjunctiva and sclera clear  NECK: Supple, No JVD  CHEST/LUNG: Clear to auscultation bilaterally; No wheeze  HEART: Regular rate and rhythm; No murmurs, rubs, or gallops  ABDOMEN: Soft, Nontender, Nondistended; Bowel sounds present  EXTREMITIES:  2-3+ bipedal edema + erythema   PSYCH: AAOx3  NEUROLOGY: non-focal  SKIN: No rashes or lesions    LABS:                        13.5   10.42 )-----------( 146      ( 25 Jun 2023 09:04 )             42.4     06-25    141  |  101  |  10  ----------------------------<  80  4.0   |  21<L>  |  0.66    Ca    9.7      25 Jun 2023 06:46    TPro  6.5  /  Alb  3.7  /  TBili  0.9  /  DBili  x   /  AST  12  /  ALT  13  /  AlkPhos  86  06-24      CARDIAC MARKERS ( 24 Jun 2023 20:34 )  x     / x     / 32 U/L / x     / 1.2 ng/mL  CARDIAC MARKERS ( 24 Jun 2023 13:33 )  x     / x     / 37 U/L / x     / 1.3 ng/mL      Urinalysis Basic - ( 25 Jun 2023 06:46 )    Color: x / Appearance: x / SG: x / pH: x  Gluc: 80 mg/dL / Ketone: x  / Bili: x / Urobili: x   Blood: x / Protein: x / Nitrite: x   Leuk Esterase: x / RBC: x / WBC x   Sq Epi: x / Non Sq Epi: x / Bacteria: x          RADIOLOGY & ADDITIONAL TESTS:    Imaging Personally Reviewed:    Consultant(s) Notes Reviewed:      Care Discussed with Consultants/Other Providers:  
Patient is a 72y old  Female who presents with a chief complaint of     SUBJECTIVE / OVERNIGHT EVENTS: c/o chest tightness. Anxious.  Review of Systems  chest pain no  palpitations no  sob no  nausea no  headache no    MEDICATIONS  (STANDING):  amLODIPine   Tablet 5 milliGRAM(s) Oral daily  aspirin enteric coated 81 milliGRAM(s) Oral daily  atorvastatin 40 milliGRAM(s) Oral at bedtime  busPIRone 10 milliGRAM(s) Oral two times a day  diltiazem    Tablet 60 milliGRAM(s) Oral every 6 hours  DULoxetine 60 milliGRAM(s) Oral two times a day  enoxaparin Injectable 40 milliGRAM(s) SubCutaneous every 24 hours  furosemide    Tablet 40 milliGRAM(s) Oral daily  gabapentin 300 milliGRAM(s) Oral two times a day  hydrALAZINE 75 milliGRAM(s) Oral two times a day  levothyroxine 125 MICROGram(s) Oral daily  losartan 100 milliGRAM(s) Oral daily  montelukast 10 milliGRAM(s) Oral daily  OLANZapine 10 milliGRAM(s) Oral daily  pantoprazole  Injectable 40 milliGRAM(s) IV Push two times a day  pentoxifylline 400 milliGRAM(s) Oral daily  propranolol 20 milliGRAM(s) Oral two times a day  tiotropium 2.5 MICROgram(s) Inhaler 2 Puff(s) Inhalation daily    MEDICATIONS  (PRN):  acetaminophen     Tablet .. 650 milliGRAM(s) Oral every 6 hours PRN Temp greater or equal to 38.5C (101.3F), Mild Pain (1 - 3)  loperamide 2 milliGRAM(s) Oral two times a day PRN Diarrhea  melatonin 3 milliGRAM(s) Oral at bedtime PRN Insomnia  oxycodone    5 mG/acetaminophen 325 mG 1 Tablet(s) Oral every 6 hours PRN Moderate Pain (4 - 6)      Vital Signs Last 24 Hrs  T(C): 36.4 (28 Jun 2023 12:37), Max: 37.7 (28 Jun 2023 06:00)  T(F): 97.5 (28 Jun 2023 12:37), Max: 99.9 (28 Jun 2023 06:00)  HR: 55 (28 Jun 2023 12:37) (55 - 86)  BP: 135/76 (28 Jun 2023 12:37) (97/62 - 142/80)  BP(mean): --  RR: 18 (28 Jun 2023 12:37) (18 - 18)  SpO2: 94% (28 Jun 2023 12:37) (93% - 96%)    Parameters below as of 28 Jun 2023 12:37  Patient On (Oxygen Delivery Method): nasal cannula        PHYSICAL EXAM:  GENERAL: NAD, well-developed  HEAD:  Atraumatic, Normocephalic  EYES: EOMI, PERRLA, conjunctiva and sclera clear  NECK: Supple, No JVD  CHEST/LUNG: Clear to auscultation bilaterally; No wheeze  HEART: Regular rate and rhythm; No murmurs, rubs, or gallops  ABDOMEN: Soft, Nontender, Nondistended; Bowel sounds present  EXTREMITIES:  2+bipedal edema erythema improving   PSYCH: AAOx3, anxious  NEUROLOGY: non-focal  SKIN: No rashes or lesions    LABS:                        13.3   7.89  )-----------( 147      ( 28 Jun 2023 06:43 )             43.0     06-28    142  |  96  |  12  ----------------------------<  111<H>  3.4<L>   |  33<H>  |  0.99    Ca    10.0      28 Jun 2023 06:43  Phos  3.6     06-27  Mg     1.8     06-28        CARDIAC MARKERS ( 28 Jun 2023 13:44 )  x     / x     / 28 U/L / x     / 1.4 ng/mL  CARDIAC MARKERS ( 27 Jun 2023 13:09 )  x     / x     / 37 U/L / x     / 1.5 ng/mL      Urinalysis Basic - ( 28 Jun 2023 06:43 )    Color: x / Appearance: x / SG: x / pH: x  Gluc: 111 mg/dL / Ketone: x  / Bili: x / Urobili: x   Blood: x / Protein: x / Nitrite: x   Leuk Esterase: x / RBC: x / WBC x   Sq Epi: x / Non Sq Epi: x / Bacteria: x      < from: TTE W or WO Ultrasound Enhancing Agent (06.25.23 @ 08:42) >  CONCLUSIONS:      1. Normal left ventricular cavity size. The left ventricular wall thickness is normal. The left ventricular systolic function is normal with an ejection fraction of 71 % by Nagel's method of disks. There are no regional wall motion abnormalities seen.   2. There is mild (grade 1) left ventricular diastolic dysfunction.   3. Normal atria.   4. Normal right ventricular cavity size, normal right ventricular wall thickness and normal right ventricular systolic function.   5. No significant valvular disease.   6. Compared to the transthoracic echocardiogram performed on 3/22/2023 there have been no significant interval changes.    < end of copied text >      RADIOLOGY & ADDITIONAL TESTS:    Imaging Personally Reviewed:    Consultant(s) Notes Reviewed:      Care Discussed with Consultants/Other Providers:  
[facility]  Daniel Marcus PA-C  [address] [place] [address]  [phone]      INTERVAL HPI/OVERNIGHT EVENTS  pt seen and examined, no new events  still with chest pain and fear of chocking    Allergies    No Known Drug Allergies  Peaches (Hives)  shellfish (Hives)  Grapes (Hives)    Intolerances      MEDICATIONS  (STANDING):  amLODIPine   Tablet 5 milliGRAM(s) Oral daily  aspirin enteric coated 81 milliGRAM(s) Oral daily  atorvastatin 40 milliGRAM(s) Oral at bedtime  busPIRone 10 milliGRAM(s) Oral two times a day  DULoxetine 60 milliGRAM(s) Oral two times a day  enoxaparin Injectable 40 milliGRAM(s) SubCutaneous every 24 hours  furosemide   Injectable 40 milliGRAM(s) IV Push daily  gabapentin 300 milliGRAM(s) Oral two times a day  hydrALAZINE 75 milliGRAM(s) Oral two times a day  levothyroxine 125 MICROGram(s) Oral daily  losartan 100 milliGRAM(s) Oral daily  montelukast 10 milliGRAM(s) Oral daily  OLANZapine 10 milliGRAM(s) Oral daily  pantoprazole    Tablet 40 milliGRAM(s) Oral before breakfast  pentoxifylline 400 milliGRAM(s) Oral daily  propranolol 20 milliGRAM(s) Oral two times a day  tiotropium 2.5 MICROgram(s) Inhaler 2 Puff(s) Inhalation daily    MEDICATIONS  (PRN):  acetaminophen     Tablet .. 650 milliGRAM(s) Oral every 6 hours PRN Temp greater or equal to 38.5C (101.3F), Mild Pain (1 - 3)  melatonin 3 milliGRAM(s) Oral at bedtime PRN Insomnia  oxycodone    5 mG/acetaminophen 325 mG 1 Tablet(s) Oral every 6 hours PRN Moderate Pain (4 - 6)            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      History of carpal tunnel release  right wrist      History of parathyroidectomy      S/P laminectomy  now bed and wheelchair ridden with severe pain    FAMILY HISTORY:  Family history of myocardial infarction  mother  at age 67 and father at age 67 of MI's    Family history of hypertension  mother and father    Family history of diabetes mellitus (Sibling)  siblings    Family history of heart disease (Sibling)  brother has a PPM    Family history of thyroid cancer (Child)  daughter has thyroid cancer      Social History:  Social History:    Marital Status:  (x   )    (   ) Single    (   )    (  )   Occupation:   Lives with: (  ) alone  (  ) children   ( x ) spouse   (  ) parents  (  ) other    Substance Use (street drugs): ( x ) never used  (  ) other:  Tobacco Usage:  (  x ) never smoked   (   ) former smoker   (   ) current smoker  (     ) pack years  (        ) last cigarette date  Alcohol Usage: no    (     ) Advanced Directives: (     ) None    (      ) DNR    (     ) DNI    (     ) Health Care Proxy: (2023 11:57)    ROS:     General:  No wt loss, fevers, chills, night sweats, fatigue,   Eyes:  Good vision, no reported pain  ENT:  No sore throat, pain, + throat dryness,  runny nose, +dysphagia  CV:  No pain, palpitations, hypo/hypertension  Resp:  No dyspnea, cough, tachypnea,   GI:  No pain, No nausea, No vomiting, +diarrhea IBS, No constipation, No weight loss, No fever, No pruritis, No rectal bleeding, No tarry stools, +dysphagia   :  No pain, bleeding, incontinence, nocturia  Muscle:  no  pain,   Neuro:  No weakness, tingling, memory problems  Psych:  No fatigue, insomnia, mood problems, depression  Endocrine:  No polyuria, polydipsia, cold/heat intolerance  Heme:  No petechiae, ecchymosis, easy bruisability  Skin:  No rash, scars, edema    Physical Exam :  Vital Signs Last 24 Hrs  T(C): 36.9 (2023 20:35), Max: 36.9 (2023 20:35)  T(F): 98.4 (2023 20:35), Max: 98.4 (2023 20:35)  HR: 61 (2023 07:51) (57 - 61)  BP: 96/61 (2023 07:51) (96/61 - 117/64)  BP(mean): --  RR: 18 (2023 07:51) (18 - 18)  SpO2: 98% (2023 07:51) (95% - 98%)    Parameters below as of 2023 07:51  Patient On (Oxygen Delivery Method): nasal cannula  O2 Flow (L/min): 2        Daily Height in cm: 152.4 (2023 06:04)    Daily     GENERAL:  Appears stated age,   HEENT:  NC/AT,    CHEST:  Full & symmetric excursion,   HEART:  Regular rhythm  ABDOMEN:  Soft, non-tender, non-distended,   EXTEREMITIES:  no cyanosis,clubbing or edema  SKIN:  No rash  NEURO:  Alert,    LABS:                 133<L>  |  94<L>  |  16  ----------------------------<  151<H>  3.7   |  30  |  1.52<H>    Ca    9.8      2023 10:25  Mg     1.6         TPro  6.3  /  Alb  3.5  /  TBili  0.9  /  DBili  x   /  AST  12  /  ALT  13  /  AlkPhos  79  06-30    LIVER FUNCTIONS - ( 2023 06:52 )  Alb: 3.7 g/dL / Pro: 6.5 g/dL / ALK PHOS: 86 U/L / ALT: 13 U/L / AST: 12 U/L / GGT: x         Urinalysis Basic - ( 2023 06:52 )    Color: x / Appearance: x / SG: x / pH: x  Gluc: 124 mg/dL / Ketone: x  / Bili: x / Urobili: x   Blood: x / Protein: x / Nitrite: x   Leuk Esterase: x / RBC: x / WBC x   Sq Epi: x / Non Sq Epi: x / Bacteria: x  Imaging:    
Cardiovascular Disease Progress Note    Overnight events: No acute events overnight.   no new cardiac sx  Otherwise review of systems negative    Objective Findings:  T(C): 36.8 (23 @ 06:24), Max: 36.8 (23 @ 06:24)  HR: 68 (23 @ 06:24) (64 - 73)  BP: 118/74 (23 @ 06:24) (118/71 - 150/81)  RR: 18 (23 @ 06:24) (18 - 18)  SpO2: 95% (23 @ 06:24) (94% - 98%)  Wt(kg): --  Daily     Daily Weight in k (2023 08:17)      Physical Exam:  Gen: NAD  HEENT: EOMI  CV: RRR, normal S1 + S2, no m/r/g  Lungs: CTAB  Abd: soft, non-tender  Ext: No edema    Telemetry:    Laboratory Data:                        13.5   10.42 )-----------( 146      ( 2023 09:04 )             42.4     -    144  |  98  |  8   ----------------------------<  139<H>  3.2<L>   |  34<H>  |  0.68    Ca    10.2      2023 06:47                Inpatient Medications:  MEDICATIONS  (STANDING):  amLODIPine   Tablet 5 milliGRAM(s) Oral daily  aspirin enteric coated 81 milliGRAM(s) Oral daily  atorvastatin 40 milliGRAM(s) Oral at bedtime  busPIRone 10 milliGRAM(s) Oral two times a day  DULoxetine 60 milliGRAM(s) Oral two times a day  enoxaparin Injectable 40 milliGRAM(s) SubCutaneous every 24 hours  furosemide   Injectable 40 milliGRAM(s) IV Push daily  gabapentin 300 milliGRAM(s) Oral two times a day  hydrALAZINE 75 milliGRAM(s) Oral two times a day  levothyroxine 125 MICROGram(s) Oral daily  losartan 100 milliGRAM(s) Oral daily  montelukast 10 milliGRAM(s) Oral daily  OLANZapine 10 milliGRAM(s) Oral daily  pantoprazole  Injectable 40 milliGRAM(s) IV Push two times a day  pentoxifylline 400 milliGRAM(s) Oral daily  propranolol 20 milliGRAM(s) Oral two times a day  tiotropium 2.5 MICROgram(s) Inhaler 2 Puff(s) Inhalation daily      Assessment:  72-year-old female history of COPD on 2 L nasal cannula at home, Graves' disease, HLD, HTN, breast cancer, kidney cancer, skin cancer all in remission presenting with a chief complaint of chest pain x2 months.        Recs:  cardiac stable  no e/o acs by ecg or biomarkers  TTE mild diastolic dysfunction, normal lv/rv and valves  cta chest without evidence of aortic dissection/PE  vol status improving--> cw lasix 40mg po qd  rec compression socks, ace wraps and leg elevation  le giovanni duplex to r/o dvt  f/u GI recs re esophagram findings      Over 25 minutes spent on total encounter; more than 50% of the visit was spent counseling and/or coordinating care by the attending physician.      Juan Salcedo MD   Cardiovascular Disease  [phone]
Cardiovascular Disease Progress Note    Overnight events: No acute events overnight.  no cp/sob/palps  Otherwise review of systems negative    Objective Findings:  T(C): 36.5 (07-03-23 @ 04:14), Max: 36.5 (07-03-23 @ 04:14)  HR: 62 (07-03-23 @ 05:11) (54 - 69)  BP: 112/68 (07-03-23 @ 05:11) (106/66 - 136/86)  RR: 18 (07-03-23 @ 04:14) (18 - 20)  SpO2: 98% (07-03-23 @ 04:14) (96% - 98%)  Wt(kg): --  Daily     Daily       Physical Exam:  Gen: NAD  HEENT: EOMI  CV: RRR, normal S1 + S2, no m/r/g  Lungs: CTAB  Abd: soft, non-tender  Ext: No edema    Telemetry:    Laboratory Data:    07-03    133<L>  |  91<L>  |  11  ----------------------------<  126<H>  3.6   |  30  |  0.85    Ca    10.0      03 Jul 2023 06:15                Inpatient Medications:  MEDICATIONS  (STANDING):  amLODIPine   Tablet 5 milliGRAM(s) Oral daily  aspirin  chewable 81 milliGRAM(s) Oral daily  atorvastatin 40 milliGRAM(s) Oral at bedtime  busPIRone 10 milliGRAM(s) Oral two times a day  diltiazem    Tablet 60 milliGRAM(s) Oral every 6 hours  DULoxetine 60 milliGRAM(s) Oral two times a day  enoxaparin Injectable 40 milliGRAM(s) SubCutaneous every 24 hours  furosemide    Tablet 40 milliGRAM(s) Oral daily  gabapentin 300 milliGRAM(s) Oral two times a day  hydrALAZINE 75 milliGRAM(s) Oral two times a day  levothyroxine 125 MICROGram(s) Oral daily  losartan 100 milliGRAM(s) Oral daily  montelukast 10 milliGRAM(s) Oral daily  nystatin Powder 1 Application(s) Topical two times a day  OLANZapine 10 milliGRAM(s) Oral daily  pantoprazole  Injectable 40 milliGRAM(s) IV Push two times a day  pentoxifylline 400 milliGRAM(s) Oral daily  propranolol 20 milliGRAM(s) Oral two times a day  sucralfate 1 Gram(s) Oral four times a day  tiotropium 2.5 MICROgram(s) Inhaler 2 Puff(s) Inhalation daily      Assessment:  72-year-old female history of COPD on 2 L nasal cannula at home, Graves' disease, HLD, HTN, breast cancer, kidney cancer, skin cancer all in remission presenting with a chief complaint of chest pain x2 months.        Recs:  cardiac stable  no e/o acs by ecg or biomarkers  TTE mild diastolic dysfunction, normal lv/rv and valves  cta chest without evidence of aortic dissection/PE  vol status improving--> cw lasix 40mg po qd  rec compression socks, ace wraps and leg elevation  le giovanni duplex to r/o dvt  f/u GI recs re esophagram findings, started on ccb - ? add nitrates  psych follow up      Over 25 minutes spent on total encounter; more than 50% of the visit was spent counseling and/or coordinating care by the attending physician.      Juan Salcedo MD   Cardiovascular Disease  [phone]
Cardiovascular Disease Progress Note    Overnight events: No acute events overnight.  no cp/sob/palps  Otherwise review of systems negative    Objective Findings:  T(C): 36.5 (23 @ 04:34), Max: 37.1 (23 @ 12:02)  HR: 55 (23 @ 04:34) (55 - 72)  BP: 123/73 (23 @ 04:34) (93/61 - 125/57)  RR: 18 (23 @ 04:34) (18 - 18)  SpO2: 97% (23 @ 04:34) (94% - 97%)  Wt(kg): --  Daily     Daily Weight in k (2023 04:34)      Physical Exam:  Gen: NAD  HEENT: EOMI  CV: RRR, normal S1 + S2, no m/r/g  Lungs: CTAB  Abd: soft, non-tender  Ext: No edema    Telemetry:    Laboratory Data:        135  |  92<L>  |  18  ----------------------------<  112<H>  3.7   |  29  |  1.27    Ca    10.1      2023 06:19  Mg     1.6         TPro  6.3  /  Alb  3.5  /  TBili  0.9  /  DBili  x   /  AST  12  /  ALT  13  /  AlkPhos  79                Inpatient Medications:  MEDICATIONS  (STANDING):  amLODIPine   Tablet 5 milliGRAM(s) Oral daily  aspirin  chewable 81 milliGRAM(s) Oral daily  atorvastatin 40 milliGRAM(s) Oral at bedtime  busPIRone 10 milliGRAM(s) Oral two times a day  diltiazem    Tablet 60 milliGRAM(s) Oral every 6 hours  DULoxetine 60 milliGRAM(s) Oral two times a day  enoxaparin Injectable 40 milliGRAM(s) SubCutaneous every 24 hours  furosemide    Tablet 40 milliGRAM(s) Oral daily  gabapentin 300 milliGRAM(s) Oral two times a day  hydrALAZINE 75 milliGRAM(s) Oral two times a day  levothyroxine 125 MICROGram(s) Oral daily  losartan 100 milliGRAM(s) Oral daily  montelukast 10 milliGRAM(s) Oral daily  OLANZapine 10 milliGRAM(s) Oral daily  pantoprazole  Injectable 40 milliGRAM(s) IV Push two times a day  pentoxifylline 400 milliGRAM(s) Oral daily  propranolol 20 milliGRAM(s) Oral two times a day  sucralfate 1 Gram(s) Oral four times a day  tiotropium 2.5 MICROgram(s) Inhaler 2 Puff(s) Inhalation daily      Assessment:  72-year-old female history of COPD on 2 L nasal cannula at home, Graves' disease, HLD, HTN, breast cancer, kidney cancer, skin cancer all in remission presenting with a chief complaint of chest pain x2 months.        Recs:  cardiac stable  no e/o acs by ecg or biomarkers  TTE mild diastolic dysfunction, normal lv/rv and valves  cta chest without evidence of aortic dissection/PE  vol status improving--> cw lasix 40mg po qd  rec compression socks, ace wraps and leg elevation  le giovanni duplex to r/o dvt  f/u GI recs re esophagram findings, started on ccb - reporting some improvement. uptitrate as able        Over 25 minutes spent on total encounter; more than 50% of the visit was spent counseling and/or coordinating care by the attending physician.      Juan Salcedo MD   Cardiovascular Disease  [phone]
Cardiovascular Disease Progress Note    Overnight events: No acute events overnight.  no cp/sob/palps  Otherwise review of systems negative    Objective Findings:  T(C): 36.7 (07-05-23 @ 04:14), Max: 36.8 (07-04-23 @ 19:58)  HR: 57 (07-05-23 @ 04:14) (57 - 80)  BP: 125/54 (07-05-23 @ 04:14) (109/72 - 132/67)  RR: 20 (07-05-23 @ 04:14) (18 - 20)  SpO2: 99% (07-05-23 @ 04:14) (95% - 99%)  Wt(kg): --  Daily     Daily       Physical Exam:  Gen: NAD  HEENT: EOMI  CV: RRR, normal S1 + S2, no m/r/g  Lungs: CTAB  Abd: soft, non-tender  Ext: No edema    Telemetry:    Laboratory Data:                    Inpatient Medications:  MEDICATIONS  (STANDING):  amitriptyline 10 milliGRAM(s) Oral at bedtime  amLODIPine   Tablet 5 milliGRAM(s) Oral daily  aspirin  chewable 81 milliGRAM(s) Oral daily  atorvastatin 40 milliGRAM(s) Oral at bedtime  busPIRone 10 milliGRAM(s) Oral two times a day  DULoxetine 60 milliGRAM(s) Oral two times a day  enoxaparin Injectable 40 milliGRAM(s) SubCutaneous every 24 hours  furosemide    Tablet 40 milliGRAM(s) Oral daily  gabapentin 300 milliGRAM(s) Oral two times a day  hydrALAZINE 75 milliGRAM(s) Oral two times a day  isosorbide   dinitrate Tablet (ISORDIL) 5 milliGRAM(s) Oral two times a day  levothyroxine 125 MICROGram(s) Oral daily  losartan 100 milliGRAM(s) Oral daily  montelukast 10 milliGRAM(s) Oral daily  nystatin Powder 1 Application(s) Topical two times a day  OLANZapine 10 milliGRAM(s) Oral daily  pantoprazole  Injectable 40 milliGRAM(s) IV Push two times a day  pentoxifylline 400 milliGRAM(s) Oral daily  propranolol 20 milliGRAM(s) Oral two times a day  sucralfate 1 Gram(s) Oral four times a day  tiotropium 2.5 MICROgram(s) Inhaler 2 Puff(s) Inhalation daily      Assessment:  72-year-old female history of COPD on 2 L nasal cannula at home, Graves' disease, HLD, HTN, breast cancer, kidney cancer, skin cancer all in remission presenting with a chief complaint of chest pain x2 months.        Recs:  cardiac stable  no e/o acs by ecg or biomarkers  TTE mild diastolic dysfunction, normal lv/rv and valves  cta chest without evidence of aortic dissection/PE  vol status improving--> cw lasix 40mg po qd  rec compression socks, ace wraps and leg elevation  le giovanni duplex to r/o dvt  f/u GI recs re esophagram findings, started on ccb   psych follow up  dcp to tien        Over 25 minutes spent on total encounter; more than 50% of the visit was spent counseling and/or coordinating care by the attending physician.      Juan Salcedo MD   Cardiovascular Disease  [phone]
[facility]  Daniel Marcus PA-C  [address] [place] [address]  [phone]      INTERVAL HPI/OVERNIGHT EVENTS  pt s/e, events noted  started on diltiazem for esophageal spasms  still reports chest pain unchanged    Allergies    No Known Drug Allergies  Peaches (Hives)  shellfish (Hives)  Grapes (Hives)    Intolerances      MEDICATIONS  (STANDING):  amLODIPine   Tablet 5 milliGRAM(s) Oral daily  aspirin enteric coated 81 milliGRAM(s) Oral daily  atorvastatin 40 milliGRAM(s) Oral at bedtime  busPIRone 10 milliGRAM(s) Oral two times a day  DULoxetine 60 milliGRAM(s) Oral two times a day  enoxaparin Injectable 40 milliGRAM(s) SubCutaneous every 24 hours  furosemide   Injectable 40 milliGRAM(s) IV Push daily  gabapentin 300 milliGRAM(s) Oral two times a day  hydrALAZINE 75 milliGRAM(s) Oral two times a day  levothyroxine 125 MICROGram(s) Oral daily  losartan 100 milliGRAM(s) Oral daily  montelukast 10 milliGRAM(s) Oral daily  OLANZapine 10 milliGRAM(s) Oral daily  pantoprazole    Tablet 40 milliGRAM(s) Oral before breakfast  pentoxifylline 400 milliGRAM(s) Oral daily  propranolol 20 milliGRAM(s) Oral two times a day  tiotropium 2.5 MICROgram(s) Inhaler 2 Puff(s) Inhalation daily    MEDICATIONS  (PRN):  acetaminophen     Tablet .. 650 milliGRAM(s) Oral every 6 hours PRN Temp greater or equal to 38.5C (101.3F), Mild Pain (1 - 3)  melatonin 3 milliGRAM(s) Oral at bedtime PRN Insomnia  oxycodone    5 mG/acetaminophen 325 mG 1 Tablet(s) Oral every 6 hours PRN Moderate Pain (4 - 6)            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      History of carpal tunnel release  right wrist      History of parathyroidectomy      S/P laminectomy  now bed and wheelchair ridden with severe pain    FAMILY HISTORY:  Family history of myocardial infarction  mother  at age 67 and father at age 67 of MI's    Family history of hypertension  mother and father    Family history of diabetes mellitus (Sibling)  siblings    Family history of heart disease (Sibling)  brother has a PPM    Family history of thyroid cancer (Child)  daughter has thyroid cancer      Social History:  Social History:    Marital Status:  (x   )    (   ) Single    (   )    (  )   Occupation:   Lives with: (  ) alone  (  ) children   ( x ) spouse   (  ) parents  (  ) other    Substance Use (street drugs): ( x ) never used  (  ) other:  Tobacco Usage:  (  x ) never smoked   (   ) former smoker   (   ) current smoker  (     ) pack years  (        ) last cigarette date  Alcohol Usage: no    (     ) Advanced Directives: (     ) None    (      ) DNR    (     ) DNI    (     ) Health Care Proxy: (2023 11:57)    ROS:     General:  No wt loss, fevers, chills, night sweats, fatigue,   Eyes:  Good vision, no reported pain  ENT:  No sore throat, pain, + throat dryness,  runny nose, +dysphagia  CV:  No pain, palpitations, hypo/hypertension  Resp:  No dyspnea, cough, tachypnea,   GI:  No pain, No nausea, No vomiting, +diarrhea IBS, No constipation, No weight loss, No fever, No pruritis, No rectal bleeding, No tarry stools, +dysphagia   :  No pain, bleeding, incontinence, nocturia  Muscle:  no  pain,   Neuro:  No weakness, tingling, memory problems  Psych:  No fatigue, insomnia, mood problems, depression  Endocrine:  No polyuria, polydipsia, cold/heat intolerance  Heme:  No petechiae, ecchymosis, easy bruisability  Skin:  No rash, scars, edema    Physical Exam :  Vital Signs Last 24 Hrs  T(C): 37.7 (2023 06:00), Max: 37.7 (2023 06:00)  T(F): 99.9 (2023 06:00), Max: 99.9 (2023 06:00)  HR: 86 (2023 06:00) (66 - 86)  BP: 130/77 (2023 06:00) (97/62 - 142/80)  BP(mean): 85 (2023 12:36) (85 - 85)  RR: 18 (2023 06:00) (18 - 18)  SpO2: 95% (2023 06:00) (93% - 98%)    Parameters below as of 2023 06:00  Patient On (Oxygen Delivery Method): nasal cannula  O2 Flow (L/min): 2      Daily Height in cm: 152.4 (2023 06:04)    Daily     GENERAL:  Appears stated age,   HEENT:  NC/AT,    CHEST:  Full & symmetric excursion,   HEART:  Regular rhythm  ABDOMEN:  Soft, non-tender, non-distended,   EXTEREMITIES:  no cyanosis,clubbing or edema  SKIN:  No rash  NEURO:  Alert,    LABS:                        13.3   7.89  )-----------( 147      ( 2023 06:43 )             43.0   06-28    142  |  96  |  12  ----------------------------<  111<H>  3.4<L>   |  33<H>  |  0.99    Ca    10.0      2023 06:43  Phos  3.6     06-27  Mg     1.8     -28      LIVER FUNCTIONS - ( 2023 06:52 )  Alb: 3.7 g/dL / Pro: 6.5 g/dL / ALK PHOS: 86 U/L / ALT: 13 U/L / AST: 12 U/L / GGT: x         Urinalysis Basic - ( 2023 06:52 )    Color: x / Appearance: x / SG: x / pH: x  Gluc: 124 mg/dL / Ketone: x  / Bili: x / Urobili: x   Blood: x / Protein: x / Nitrite: x   Leuk Esterase: x / RBC: x / WBC x   Sq Epi: x / Non Sq Epi: x / Bacteria: x  Imaging:    
Patient is a 72y old  Female who presents with a chief complaint of Chest pain    Per chart: "72-year-old female history of COPD on 2 L nasal cannula at home, Graves' disease, HLD, HTN, breast cancer, kidney cancer, skin cancer all in remission presenting with a chief complaint of chest pain x2 months.  Patient states that pain radiates to her back and her mouth feels dry. Patient is also having difficulty swallowing. Patient states that she feels like she is having a choking sensation.  Patient describes symptoms as a tightness.  Patient states that she is coming in today because the symptoms are progressively getting worse.  Patient at baseline is not able to ambulate and does not know if movement makes symptoms worse.  Patient reports shortness of breath as well.  The patient's  is at bedside and reports that the patient also has had leg swelling.   reports that patient has had cellulitis in the past.  And he has noticed that there is increased redness of the leg.  Patient denies any recent illnesses, nausea, vomiting any headaches, visual changes, diarrhea, urinary symptoms."   (05 Jul 2023 14:39)      SUBJECTIVE / OVERNIGHT EVENTS: No new complaints.   Review of Systems  chest pain no  palpitations no  sob no  nausea no  headache no    MEDICATIONS  (STANDING):  amitriptyline 10 milliGRAM(s) Oral at bedtime  amLODIPine   Tablet 5 milliGRAM(s) Oral daily  atorvastatin 40 milliGRAM(s) Oral at bedtime  busPIRone 10 milliGRAM(s) Oral two times a day  diltiazem    Tablet 60 milliGRAM(s) Oral every 6 hours  DULoxetine 60 milliGRAM(s) Oral two times a day  enoxaparin Injectable 40 milliGRAM(s) SubCutaneous every 24 hours  furosemide    Tablet 40 milliGRAM(s) Oral daily  gabapentin 300 milliGRAM(s) Oral two times a day  hydrALAZINE 75 milliGRAM(s) Oral two times a day  levothyroxine 125 MICROGram(s) Oral daily  losartan 100 milliGRAM(s) Oral daily  montelukast 10 milliGRAM(s) Oral daily  multivitamin 1 Tablet(s) Oral daily  nystatin Powder 1 Application(s) Topical two times a day  OLANZapine 10 milliGRAM(s) Oral daily  pantoprazole  Injectable 40 milliGRAM(s) IV Push two times a day  pentoxifylline 400 milliGRAM(s) Oral daily  propranolol 20 milliGRAM(s) Oral two times a day  sucralfate 1 Gram(s) Oral four times a day  tiotropium 2.5 MICROgram(s) Inhaler 2 Puff(s) Inhalation daily    MEDICATIONS  (PRN):  acetaminophen     Tablet .. 650 milliGRAM(s) Oral every 6 hours PRN Temp greater or equal to 38.5C (101.3F), Mild Pain (1 - 3)  albuterol    90 MICROgram(s) HFA Inhaler 2 Puff(s) Inhalation every 6 hours PRN Shortness of Breath and/or Wheezing  loperamide 2 milliGRAM(s) Oral two times a day PRN Diarrhea  LORazepam     Tablet 0.5 milliGRAM(s) Oral two times a day PRN Anxiety  melatonin 3 milliGRAM(s) Oral at bedtime PRN Insomnia  oxycodone    5 mG/acetaminophen 325 mG 1 Tablet(s) Oral every 6 hours PRN Moderate Pain (4 - 6)      Vital Signs Last 24 Hrs  T(C): 36.6 (06 Jul 2023 10:59), Max: 36.7 (05 Jul 2023 20:21)  T(F): 97.8 (06 Jul 2023 10:59), Max: 98.1 (05 Jul 2023 20:21)  HR: 77 (06 Jul 2023 11:01) (60 - 77)  BP: 123/67 (06 Jul 2023 10:59) (119/65 - 152/62)  BP(mean): --  RR: 18 (06 Jul 2023 10:59) (18 - 19)  SpO2: 95% (06 Jul 2023 10:59) (95% - 97%)    Parameters below as of 06 Jul 2023 10:59  Patient On (Oxygen Delivery Method): nasal cannula  O2 Flow (L/min): 2      PHYSICAL EXAM:  GENERAL: NAD  HEAD:  Atraumatic, Normocephalic  EYES: EOMI, PERRLA, conjunctiva and sclera clear  NECK: Supple, No JVD  CHEST/LUNG: Clear to auscultation bilaterally; No wheeze  HEART: Regular rate and rhythm; No murmurs, rubs, or gallops  ABDOMEN: Soft, Nontender, Nondistended; Bowel sounds present  EXTREMITIES:  2+ bipedal edema  PSYCH: AAOx3, anxious  NEUROLOGY: non-focal  SKIN: No rashes or lesions    LABS:                      RADIOLOGY & ADDITIONAL TESTS:    Imaging Personally Reviewed:    Consultant(s) Notes Reviewed:      Care Discussed with Consultants/Other Providers:  
[facility]  Daniel Marcus PA-C  [address] [place] [address]  [phone]      INTERVAL HPI/OVERNIGHT EVENTS  pt s/e, no new events   reports chest pressure and burning   also with difficulty swallowing    Allergies    No Known Drug Allergies  Peaches (Hives)  shellfish (Hives)  Grapes (Hives)    Intolerances      MEDICATIONS  (STANDING):  amLODIPine   Tablet 5 milliGRAM(s) Oral daily  aspirin enteric coated 81 milliGRAM(s) Oral daily  atorvastatin 40 milliGRAM(s) Oral at bedtime  busPIRone 10 milliGRAM(s) Oral two times a day  DULoxetine 60 milliGRAM(s) Oral two times a day  enoxaparin Injectable 40 milliGRAM(s) SubCutaneous every 24 hours  furosemide   Injectable 40 milliGRAM(s) IV Push daily  gabapentin 300 milliGRAM(s) Oral two times a day  hydrALAZINE 75 milliGRAM(s) Oral two times a day  levothyroxine 125 MICROGram(s) Oral daily  losartan 100 milliGRAM(s) Oral daily  montelukast 10 milliGRAM(s) Oral daily  OLANZapine 10 milliGRAM(s) Oral daily  pantoprazole    Tablet 40 milliGRAM(s) Oral before breakfast  pentoxifylline 400 milliGRAM(s) Oral daily  propranolol 20 milliGRAM(s) Oral two times a day  tiotropium 2.5 MICROgram(s) Inhaler 2 Puff(s) Inhalation daily    MEDICATIONS  (PRN):  acetaminophen     Tablet .. 650 milliGRAM(s) Oral every 6 hours PRN Temp greater or equal to 38.5C (101.3F), Mild Pain (1 - 3)  melatonin 3 milliGRAM(s) Oral at bedtime PRN Insomnia  oxycodone    5 mG/acetaminophen 325 mG 1 Tablet(s) Oral every 6 hours PRN Moderate Pain (4 - 6)            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      History of carpal tunnel release  right wrist      History of parathyroidectomy      S/P laminectomy  now bed and wheelchair ridden with severe pain    FAMILY HISTORY:  Family history of myocardial infarction  mother  at age 67 and father at age 67 of MI's    Family history of hypertension  mother and father    Family history of diabetes mellitus (Sibling)  siblings    Family history of heart disease (Sibling)  brother has a PPM    Family history of thyroid cancer (Child)  daughter has thyroid cancer      Social History:  Social History:    Marital Status:  (x   )    (   ) Single    (   )    (  )   Occupation:   Lives with: (  ) alone  (  ) children   ( x ) spouse   (  ) parents  (  ) other    Substance Use (street drugs): ( x ) never used  (  ) other:  Tobacco Usage:  (  x ) never smoked   (   ) former smoker   (   ) current smoker  (     ) pack years  (        ) last cigarette date  Alcohol Usage: no    (     ) Advanced Directives: (     ) None    (      ) DNR    (     ) DNI    (     ) Health Care Proxy: (2023 11:57)    ROS:     General:  No wt loss, fevers, chills, night sweats, fatigue,   Eyes:  Good vision, no reported pain  ENT:  No sore throat, pain, + throat dryness,  runny nose, +dysphagia  CV:  No pain, palpitations, hypo/hypertension  Resp:  No dyspnea, cough, tachypnea,   GI:  No pain, No nausea, No vomiting, +diarrhea IBS, No constipation, No weight loss, No fever, No pruritis, No rectal bleeding, No tarry stools, +dysphagia   :  No pain, bleeding, incontinence, nocturia  Muscle:  no  pain,   Neuro:  No weakness, tingling, memory problems  Psych:  No fatigue, insomnia, mood problems, depression  Endocrine:  No polyuria, polydipsia, cold/heat intolerance  Heme:  No petechiae, ecchymosis, easy bruisability  Skin:  No rash, scars, edema    Physical Exam :  Vital Signs Last 24 Hrs  T(C): 36.8 (2023 05:10), Max: 36.8 (2023 05:10)  T(F): 98.2 (2023 05:10), Max: 98.2 (2023 05:10)  HR: 80 (2023 05:10) (65 - 80)  BP: 132/75 (2023 05:10) (93/54 - 132/75)  BP(mean): --  RR: 18 (2023 05:10) (18 - 18)  SpO2: 95% (2023 05:10) (95% - 99%)    Parameters below as of 2023 05:10  Patient On (Oxygen Delivery Method): nasal cannula  O2 Flow (L/min): 2      Daily Height in cm: 152.4 (2023 06:04)    Daily     GENERAL:  Appears stated age,   HEENT:  NC/AT,    CHEST:  Full & symmetric excursion,   HEART:  Regular rhythm  ABDOMEN:  Soft, non-tender, non-distended,   EXTEREMITIES:  no cyanosis,clubbing or edema  SKIN:  No rash  NEURO:  Alert,    LABS:                        13.5   10.42 )-----------( 146      ( 2023 09:04 )             42.4   06-26    144  |  98  |  8   ----------------------------<  139<H>  3.2<L>   |  34<H>  |  0.68    Ca    10.2      2023 06:47          LIVER FUNCTIONS - ( 2023 06:52 )  Alb: 3.7 g/dL / Pro: 6.5 g/dL / ALK PHOS: 86 U/L / ALT: 13 U/L / AST: 12 U/L / GGT: x         Urinalysis Basic - ( 2023 06:52 )    Color: x / Appearance: x / SG: x / pH: x  Gluc: 124 mg/dL / Ketone: x  / Bili: x / Urobili: x   Blood: x / Protein: x / Nitrite: x   Leuk Esterase: x / RBC: x / WBC x   Sq Epi: x / Non Sq Epi: x / Bacteria: x  Imaging:    
[facility]  Daniel Marcus PA-C  [address] [place] [address]  [phone]      INTERVAL HPI/OVERNIGHT EVENTS  pt seen and examined,   still with chest pain and fear of chocking    Allergies    No Known Drug Allergies  Peaches (Hives)  shellfish (Hives)  Grapes (Hives)    Intolerances    MEDICATIONS  (STANDING):  amLODIPine   Tablet 5 milliGRAM(s) Oral daily  aspirin  chewable 81 milliGRAM(s) Oral daily  atorvastatin 40 milliGRAM(s) Oral at bedtime  busPIRone 10 milliGRAM(s) Oral two times a day  diltiazem    Tablet 60 milliGRAM(s) Oral every 6 hours  DULoxetine 60 milliGRAM(s) Oral two times a day  enoxaparin Injectable 40 milliGRAM(s) SubCutaneous every 24 hours  furosemide    Tablet 40 milliGRAM(s) Oral daily  gabapentin 300 milliGRAM(s) Oral two times a day  hydrALAZINE 75 milliGRAM(s) Oral two times a day  levothyroxine 125 MICROGram(s) Oral daily  losartan 100 milliGRAM(s) Oral daily  montelukast 10 milliGRAM(s) Oral daily  OLANZapine 10 milliGRAM(s) Oral daily  pantoprazole  Injectable 40 milliGRAM(s) IV Push two times a day  pentoxifylline 400 milliGRAM(s) Oral daily  propranolol 20 milliGRAM(s) Oral two times a day  sucralfate 1 Gram(s) Oral four times a day  tiotropium 2.5 MICROgram(s) Inhaler 2 Puff(s) Inhalation daily    MEDICATIONS  (PRN):  acetaminophen     Tablet .. 650 milliGRAM(s) Oral every 6 hours PRN Temp greater or equal to 38.5C (101.3F), Mild Pain (1 - 3)  albuterol    90 MICROgram(s) HFA Inhaler 2 Puff(s) Inhalation every 6 hours PRN Shortness of Breath and/or Wheezing  loperamide 2 milliGRAM(s) Oral two times a day PRN Diarrhea  LORazepam     Tablet 0.5 milliGRAM(s) Oral two times a day PRN Anxiety  melatonin 3 milliGRAM(s) Oral at bedtime PRN Insomnia  oxycodone    5 mG/acetaminophen 325 mG 1 Tablet(s) Oral every 6 hours PRN Moderate Pain (4 - 6)  atin 40 milliGRAM(s) Oral at bedtime            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      History of carpal tunnel release  right wrist      History of parathyroidectomy      S/P laminectomy  now bed and wheelchair ridden with severe pain    FAMILY HISTORY:  Family history of myocardial infarction  mother  at age 67 and father at age 67 of MI's    Family history of hypertension  mother and father    Family history of diabetes mellitus (Sibling)  siblings    Family history of heart disease (Sibling)  brother has a PPM    Family history of thyroid cancer (Child)  daughter has thyroid cancer      Social History:  Social History:    Marital Status:  (x   )    (   ) Single    (   )    (  )   Occupation:   Lives with: (  ) alone  (  ) children   ( x ) spouse   (  ) parents  (  ) other    Substance Use (street drugs): ( x ) never used  (  ) other:  Tobacco Usage:  (  x ) never smoked   (   ) former smoker   (   ) current smoker  (     ) pack years  (        ) last cigarette date  Alcohol Usage: no    (     ) Advanced Directives: (     ) None    (      ) DNR    (     ) DNI    (     ) Health Care Proxy: (2023 11:57)    ROS:     General:  No wt loss, fevers, chills, night sweats, fatigue,   Eyes:  Good vision, no reported pain  ENT:  No sore throat, pain, + throat dryness,  runny nose, +dysphagia  CV:  No pain, palpitations, hypo/hypertension  Resp:  No dyspnea, cough, tachypnea,   GI:  No pain, No nausea, No vomiting, +diarrhea IBS, No constipation, No weight loss, No fever, No pruritis, No rectal bleeding, No tarry stools, +dysphagia   :  No pain, bleeding, incontinence, nocturia  Muscle:  no  pain,   Neuro:  No weakness, tingling, memory problems  Psych:  No fatigue, insomnia, mood problems, depression  Endocrine:  No polyuria, polydipsia, cold/heat intolerance  Heme:  No petechiae, ecchymosis, easy bruisability  Skin:  No rash, scars, edema    Physical Exam :  Vital Signs Last 24 Hrs  T(C): 36.5 (2023 04:34), Max: 37.1 (2023 12:02)  T(F): 97.7 (2023 04:34), Max: 98.8 (2023 23:53)  HR: 55 (2023 04:34) (55 - 72)  BP: 123/73 (2023 04:34) (93/61 - 125/57)  BP(mean): --  RR: 18 (2023 04:34) (18 - 18)  SpO2: 97% (2023 04:34) (94% - 97%)    Parameters below as of 2023 04:34  Patient On (Oxygen Delivery Method): nasal cannula  O2 Flow (L/min): 2      Daily Height in cm: 152.4 (2023 06:04)    Daily     GENERAL:  Appears stated age,   HEENT:  NC/AT,    CHEST:  Full & symmetric excursion,   HEART:  Regular rhythm  ABDOMEN:  Soft, non-tender, non-distended,   EXTEREMITIES:  no cyanosis,clubbing or edema  SKIN:  No rash  NEURO:  Alert,    LABS:                   133<L>  |  94<L>  |  16  ----------------------------<  151<H>  3.7   |  30  |  1.52<H>    Ca    9.8      2023 10:25  Mg     1.6         TPro  6.3  /  Alb  3.5  /  TBili  0.9  /  DBili  x   /  AST  12  /  ALT  13  /  AlkPhos  79  30    LIVER FUNCTIONS - ( 2023 06:52 )  Alb: 3.7 g/dL / Pro: 6.5 g/dL / ALK PHOS: 86 U/L / ALT: 13 U/L / AST: 12 U/L / GGT: x         Urinalysis Basic - ( 2023 06:52 )    Color: x / Appearance: x / SG: x / pH: x  Gluc: 124 mg/dL / Ketone: x  / Bili: x / Urobili: x   Blood: x / Protein: x / Nitrite: x   Leuk Esterase: x / RBC: x / WBC x   Sq Epi: x / Non Sq Epi: x / Bacteria: x  Imaging:    
Patient is a 72y old  Female who presents with a chief complaint of     SUBJECTIVE / OVERNIGHT EVENTS: Comfortable without new complaints.   Review of Systems  chest pain no  palpitations no  sob no  nausea no  headache no    MEDICATIONS  (STANDING):  amitriptyline 10 milliGRAM(s) Oral at bedtime  amLODIPine   Tablet 5 milliGRAM(s) Oral daily  aspirin  chewable 81 milliGRAM(s) Oral daily  atorvastatin 40 milliGRAM(s) Oral at bedtime  busPIRone 10 milliGRAM(s) Oral two times a day  DULoxetine 60 milliGRAM(s) Oral two times a day  enoxaparin Injectable 40 milliGRAM(s) SubCutaneous every 24 hours  furosemide    Tablet 40 milliGRAM(s) Oral daily  gabapentin 300 milliGRAM(s) Oral two times a day  hydrALAZINE 75 milliGRAM(s) Oral two times a day  isosorbide   dinitrate Tablet (ISORDIL) 5 milliGRAM(s) Oral two times a day  levothyroxine 125 MICROGram(s) Oral daily  losartan 100 milliGRAM(s) Oral daily  montelukast 10 milliGRAM(s) Oral daily  nystatin Powder 1 Application(s) Topical two times a day  OLANZapine 10 milliGRAM(s) Oral daily  pantoprazole  Injectable 40 milliGRAM(s) IV Push two times a day  pentoxifylline 400 milliGRAM(s) Oral daily  propranolol 20 milliGRAM(s) Oral two times a day  sucralfate 1 Gram(s) Oral four times a day  tiotropium 2.5 MICROgram(s) Inhaler 2 Puff(s) Inhalation daily    MEDICATIONS  (PRN):  acetaminophen     Tablet .. 650 milliGRAM(s) Oral every 6 hours PRN Temp greater or equal to 38.5C (101.3F), Mild Pain (1 - 3)  albuterol    90 MICROgram(s) HFA Inhaler 2 Puff(s) Inhalation every 6 hours PRN Shortness of Breath and/or Wheezing  loperamide 2 milliGRAM(s) Oral two times a day PRN Diarrhea  LORazepam     Tablet 0.5 milliGRAM(s) Oral two times a day PRN Anxiety  melatonin 3 milliGRAM(s) Oral at bedtime PRN Insomnia  oxycodone    5 mG/acetaminophen 325 mG 1 Tablet(s) Oral every 6 hours PRN Moderate Pain (4 - 6)      Vital Signs Last 24 Hrs  T(C): 36.9 (03 Jul 2023 11:49), Max: 36.9 (03 Jul 2023 11:49)  T(F): 98.4 (03 Jul 2023 11:49), Max: 98.4 (03 Jul 2023 11:49)  HR: 62 (03 Jul 2023 11:49) (54 - 69)  BP: 128/68 (03 Jul 2023 11:49) (106/66 - 136/86)  BP(mean): --  RR: 18 (03 Jul 2023 11:49) (18 - 20)  SpO2: 96% (03 Jul 2023 11:49) (96% - 98%)    Parameters below as of 03 Jul 2023 11:49  Patient On (Oxygen Delivery Method): nasal cannula  O2 Flow (L/min): 2      PHYSICAL EXAM:  GENERAL: NAD  HEAD:  Atraumatic, Normocephalic  EYES: EOMI, PERRLA, conjunctiva and sclera clear  NECK: Supple, No JVD  CHEST/LUNG: Clear to auscultation bilaterally; No wheeze  HEART: Regular rate and rhythm; No murmurs, rubs, or gallops  ABDOMEN: Soft, Nontender, Nondistended; Bowel sounds present  EXTREMITIES:  2+ bipedal edema  PSYCH: AAOx3  NEUROLOGY: non-focal  SKIN: No rashes or lesions    LABS:    07-03    133<L>  |  91<L>  |  11  ----------------------------<  126<H>  3.6   |  30  |  0.85    Ca    10.0      03 Jul 2023 06:15            Urinalysis Basic - ( 03 Jul 2023 06:15 )    Color: x / Appearance: x / SG: x / pH: x  Gluc: 126 mg/dL / Ketone: x  / Bili: x / Urobili: x   Blood: x / Protein: x / Nitrite: x   Leuk Esterase: x / RBC: x / WBC x   Sq Epi: x / Non Sq Epi: x / Bacteria: x          RADIOLOGY & ADDITIONAL TESTS:    Imaging Personally Reviewed:    Consultant(s) Notes Reviewed:      Care Discussed with Consultants/Other Providers:  
Patient is a 72y old  Female who presents with a chief complaint of     SUBJECTIVE / OVERNIGHT EVENTS: Comfortable without new complaints.  Family at bedside.   Review of Systems  chest pain no  palpitations no  sob no  nausea no  headache no    MEDICATIONS  (STANDING):  amitriptyline 10 milliGRAM(s) Oral at bedtime  amLODIPine   Tablet 5 milliGRAM(s) Oral daily  aspirin  chewable 81 milliGRAM(s) Oral daily  atorvastatin 40 milliGRAM(s) Oral at bedtime  busPIRone 10 milliGRAM(s) Oral two times a day  DULoxetine 60 milliGRAM(s) Oral two times a day  enoxaparin Injectable 40 milliGRAM(s) SubCutaneous every 24 hours  furosemide    Tablet 40 milliGRAM(s) Oral daily  gabapentin 300 milliGRAM(s) Oral two times a day  hydrALAZINE 75 milliGRAM(s) Oral two times a day  isosorbide   dinitrate Tablet (ISORDIL) 5 milliGRAM(s) Oral two times a day  levothyroxine 125 MICROGram(s) Oral daily  losartan 100 milliGRAM(s) Oral daily  montelukast 10 milliGRAM(s) Oral daily  nystatin Powder 1 Application(s) Topical two times a day  OLANZapine 10 milliGRAM(s) Oral daily  pantoprazole  Injectable 40 milliGRAM(s) IV Push two times a day  pentoxifylline 400 milliGRAM(s) Oral daily  propranolol 20 milliGRAM(s) Oral two times a day  sucralfate 1 Gram(s) Oral four times a day  tiotropium 2.5 MICROgram(s) Inhaler 2 Puff(s) Inhalation daily    MEDICATIONS  (PRN):  acetaminophen     Tablet .. 650 milliGRAM(s) Oral every 6 hours PRN Temp greater or equal to 38.5C (101.3F), Mild Pain (1 - 3)  albuterol    90 MICROgram(s) HFA Inhaler 2 Puff(s) Inhalation every 6 hours PRN Shortness of Breath and/or Wheezing  loperamide 2 milliGRAM(s) Oral two times a day PRN Diarrhea  LORazepam     Tablet 0.5 milliGRAM(s) Oral two times a day PRN Anxiety  melatonin 3 milliGRAM(s) Oral at bedtime PRN Insomnia  oxycodone    5 mG/acetaminophen 325 mG 1 Tablet(s) Oral every 6 hours PRN Moderate Pain (4 - 6)      Vital Signs Last 24 Hrs  T(C): 36.6 (04 Jul 2023 12:00), Max: 36.6 (03 Jul 2023 19:48)  T(F): 97.8 (04 Jul 2023 12:00), Max: 97.9 (03 Jul 2023 19:48)  HR: 61 (04 Jul 2023 12:00) (58 - 80)  BP: 132/67 (04 Jul 2023 12:00) (110/66 - 147/71)  BP(mean): --  RR: 18 (04 Jul 2023 12:00) (18 - 20)  SpO2: 96% (04 Jul 2023 12:00) (95% - 97%)    Parameters below as of 04 Jul 2023 12:00  Patient On (Oxygen Delivery Method): nasal cannula  O2 Flow (L/min): 2      PHYSICAL EXAM:  GENERAL: NAD, well-developed  HEAD:  Atraumatic, Normocephalic  EYES: EOMI, PERRLA, conjunctiva and sclera clear  NECK: Supple, No JVD  CHEST/LUNG: Clear to auscultation bilaterally; No wheeze  HEART: Regular rate and rhythm; No murmurs, rubs, or gallops  ABDOMEN: Soft, Nontender, Nondistended; Bowel sounds present  EXTREMITIES:  2+bipedal edema  PSYCH: AAOx3, anxious   NEUROLOGY: non-focal  SKIN: No rashes or lesions    LABS:    07-03    133<L>  |  91<L>  |  11  ----------------------------<  126<H>  3.6   |  30  |  0.85    Ca    10.0      03 Jul 2023 06:15            Urinalysis Basic - ( 03 Jul 2023 06:15 )    Color: x / Appearance: x / SG: x / pH: x  Gluc: 126 mg/dL / Ketone: x  / Bili: x / Urobili: x   Blood: x / Protein: x / Nitrite: x   Leuk Esterase: x / RBC: x / WBC x   Sq Epi: x / Non Sq Epi: x / Bacteria: x          RADIOLOGY & ADDITIONAL TESTS:    Imaging Personally Reviewed:    Consultant(s) Notes Reviewed:      Care Discussed with Consultants/Other Providers:  
Patient is a 72y old  Female who presents with a chief complaint of     SUBJECTIVE / OVERNIGHT EVENTS: Comfortable without new complaints. Anxious   Review of Systems  chest pain no  palpitations no  sob no  nausea no  headache no    MEDICATIONS  (STANDING):  amLODIPine   Tablet 5 milliGRAM(s) Oral daily  aspirin  chewable 81 milliGRAM(s) Oral daily  atorvastatin 40 milliGRAM(s) Oral at bedtime  busPIRone 10 milliGRAM(s) Oral two times a day  diltiazem    Tablet 60 milliGRAM(s) Oral every 6 hours  DULoxetine 60 milliGRAM(s) Oral two times a day  enoxaparin Injectable 40 milliGRAM(s) SubCutaneous every 24 hours  furosemide    Tablet 40 milliGRAM(s) Oral daily  gabapentin 300 milliGRAM(s) Oral two times a day  hydrALAZINE 75 milliGRAM(s) Oral two times a day  levothyroxine 125 MICROGram(s) Oral daily  losartan 100 milliGRAM(s) Oral daily  montelukast 10 milliGRAM(s) Oral daily  OLANZapine 10 milliGRAM(s) Oral daily  pantoprazole  Injectable 40 milliGRAM(s) IV Push two times a day  pentoxifylline 400 milliGRAM(s) Oral daily  propranolol 20 milliGRAM(s) Oral two times a day  sucralfate 1 Gram(s) Oral four times a day  tiotropium 2.5 MICROgram(s) Inhaler 2 Puff(s) Inhalation daily    MEDICATIONS  (PRN):  acetaminophen     Tablet .. 650 milliGRAM(s) Oral every 6 hours PRN Temp greater or equal to 38.5C (101.3F), Mild Pain (1 - 3)  albuterol    90 MICROgram(s) HFA Inhaler 2 Puff(s) Inhalation every 6 hours PRN Shortness of Breath and/or Wheezing  loperamide 2 milliGRAM(s) Oral two times a day PRN Diarrhea  LORazepam     Tablet 0.5 milliGRAM(s) Oral two times a day PRN Anxiety  melatonin 3 milliGRAM(s) Oral at bedtime PRN Insomnia  oxycodone    5 mG/acetaminophen 325 mG 1 Tablet(s) Oral every 6 hours PRN Moderate Pain (4 - 6)      Vital Signs Last 24 Hrs  T(C): 37.1 (30 Jun 2023 12:02), Max: 37.1 (30 Jun 2023 12:02)  T(F): 98.7 (30 Jun 2023 12:02), Max: 98.7 (30 Jun 2023 12:02)  HR: 72 (30 Jun 2023 17:45) (57 - 72)  BP: 118/72 (30 Jun 2023 17:45) (96/61 - 125/57)  BP(mean): --  RR: 18 (30 Jun 2023 12:02) (18 - 18)  SpO2: 94% (30 Jun 2023 12:02) (94% - 98%)    Parameters below as of 30 Jun 2023 12:02  Patient On (Oxygen Delivery Method): nasal cannula  O2 Flow (L/min): 2      PHYSICAL EXAM:  GENERAL: NAD  HEAD:  Atraumatic, Normocephalic  EYES: EOMI, PERRLA, conjunctiva and sclera clear  NECK: Supple, No JVD  CHEST/LUNG: Clear to auscultation bilaterally; No wheeze  HEART: Regular rate and rhythm; No murmurs, rubs, or gallops  ABDOMEN: Soft, Nontender, Nondistended; Bowel sounds present  EXTREMITIES:  2+ bipedal edema  PSYCH: AAOx3, anxious   NEUROLOGY: non-focal  SKIN: No rashes or lesions    LABS:    06-30    133<L>  |  94<L>  |  16  ----------------------------<  151<H>  3.7   |  30  |  1.52<H>    Ca    9.8      30 Jun 2023 10:25  Mg     1.6     06-29    TPro  6.3  /  Alb  3.5  /  TBili  0.9  /  DBili  x   /  AST  12  /  ALT  13  /  AlkPhos  79  06-30          Urinalysis Basic - ( 30 Jun 2023 10:25 )    Color: x / Appearance: x / SG: x / pH: x  Gluc: 151 mg/dL / Ketone: x  / Bili: x / Urobili: x   Blood: x / Protein: x / Nitrite: x   Leuk Esterase: x / RBC: x / WBC x   Sq Epi: x / Non Sq Epi: x / Bacteria: x          RADIOLOGY & ADDITIONAL TESTS:    Imaging Personally Reviewed:    Consultant(s) Notes Reviewed:      Care Discussed with Consultants/Other Providers:  
Patient is a 72y old  Female who presents with a chief complaint of     SUBJECTIVE / OVERNIGHT EVENTS: No new complaints.   Review of Systems  chest pain no  palpitations no  sob no  nausea no  headache no    MEDICATIONS  (STANDING):  amLODIPine   Tablet 5 milliGRAM(s) Oral daily  aspirin enteric coated 81 milliGRAM(s) Oral daily  atorvastatin 40 milliGRAM(s) Oral at bedtime  busPIRone 10 milliGRAM(s) Oral two times a day  DULoxetine 60 milliGRAM(s) Oral two times a day  enoxaparin Injectable 40 milliGRAM(s) SubCutaneous every 24 hours  furosemide   Injectable 40 milliGRAM(s) IV Push daily  gabapentin 300 milliGRAM(s) Oral two times a day  hydrALAZINE 75 milliGRAM(s) Oral two times a day  levothyroxine 125 MICROGram(s) Oral daily  losartan 100 milliGRAM(s) Oral daily  montelukast 10 milliGRAM(s) Oral daily  OLANZapine 10 milliGRAM(s) Oral daily  pantoprazole  Injectable 40 milliGRAM(s) IV Push two times a day  pentoxifylline 400 milliGRAM(s) Oral daily  propranolol 20 milliGRAM(s) Oral two times a day  tiotropium 2.5 MICROgram(s) Inhaler 2 Puff(s) Inhalation daily    MEDICATIONS  (PRN):  acetaminophen     Tablet .. 650 milliGRAM(s) Oral every 6 hours PRN Temp greater or equal to 38.5C (101.3F), Mild Pain (1 - 3)  melatonin 3 milliGRAM(s) Oral at bedtime PRN Insomnia  oxycodone    5 mG/acetaminophen 325 mG 1 Tablet(s) Oral every 6 hours PRN Moderate Pain (4 - 6)      Vital Signs Last 24 Hrs  T(C): 36.6 (26 Jun 2023 12:41), Max: 36.8 (26 Jun 2023 05:10)  T(F): 97.8 (26 Jun 2023 12:41), Max: 98.2 (26 Jun 2023 05:10)  HR: 67 (26 Jun 2023 13:46) (65 - 80)  BP: 138/56 (26 Jun 2023 13:46) (120/65 - 138/56)  BP(mean): --  RR: 18 (26 Jun 2023 12:41) (18 - 18)  SpO2: 96% (26 Jun 2023 13:46) (94% - 96%)    Parameters below as of 26 Jun 2023 13:46  Patient On (Oxygen Delivery Method): nasal cannula  O2 Flow (L/min): 2      PHYSICAL EXAM:  GENERAL: NAD  HEAD:  Atraumatic, Normocephalic  EYES: EOMI, PERRLA, conjunctiva and sclera clear  NECK: Supple, No JVD  CHEST/LUNG: Clear to auscultation bilaterally; No wheeze  HEART: Regular rate and rhythm; No murmurs, rubs, or gallops  ABDOMEN: Soft, Nontender, Nondistended; Bowel sounds present  EXTREMITIES:  2-3+ bipedal edema improving erythema   PSYCH: AAOx3  NEUROLOGY: non-focal  SKIN: No rashes or lesions    LABS:                        13.5   10.42 )-----------( 146      ( 25 Jun 2023 09:04 )             42.4     06-26    144  |  98  |  8   ----------------------------<  139<H>  3.2<L>   |  34<H>  |  0.68    Ca    10.2      26 Jun 2023 06:47        CARDIAC MARKERS ( 24 Jun 2023 20:34 )  x     / x     / 32 U/L / x     / 1.2 ng/mL      Urinalysis Basic - ( 26 Jun 2023 06:47 )    Color: x / Appearance: x / SG: x / pH: x  Gluc: 139 mg/dL / Ketone: x  / Bili: x / Urobili: x   Blood: x / Protein: x / Nitrite: x   Leuk Esterase: x / RBC: x / WBC x   Sq Epi: x / Non Sq Epi: x / Bacteria: x          RADIOLOGY & ADDITIONAL TESTS:    Imaging Personally Reviewed:    Consultant(s) Notes Reviewed:      Care Discussed with Consultants/Other Providers:  
Patient is a 72y old  Female who presents with a chief complaint of     SUBJECTIVE / OVERNIGHT EVENTS: Still with episodes of esophageal spasms. Family at bedside.   Review of Systems    palpitations no  sob no  nausea no  headache no    MEDICATIONS  (STANDING):  amLODIPine   Tablet 5 milliGRAM(s) Oral daily  aspirin  chewable 81 milliGRAM(s) Oral daily  atorvastatin 40 milliGRAM(s) Oral at bedtime  busPIRone 10 milliGRAM(s) Oral two times a day  diltiazem    Tablet 60 milliGRAM(s) Oral every 6 hours  DULoxetine 60 milliGRAM(s) Oral two times a day  enoxaparin Injectable 40 milliGRAM(s) SubCutaneous every 24 hours  furosemide    Tablet 40 milliGRAM(s) Oral daily  gabapentin 300 milliGRAM(s) Oral two times a day  hydrALAZINE 75 milliGRAM(s) Oral two times a day  levothyroxine 125 MICROGram(s) Oral daily  losartan 100 milliGRAM(s) Oral daily  montelukast 10 milliGRAM(s) Oral daily  nystatin Powder 1 Application(s) Topical two times a day  OLANZapine 10 milliGRAM(s) Oral daily  pantoprazole  Injectable 40 milliGRAM(s) IV Push two times a day  pentoxifylline 400 milliGRAM(s) Oral daily  propranolol 20 milliGRAM(s) Oral two times a day  sucralfate 1 Gram(s) Oral four times a day  tiotropium 2.5 MICROgram(s) Inhaler 2 Puff(s) Inhalation daily    MEDICATIONS  (PRN):  acetaminophen     Tablet .. 650 milliGRAM(s) Oral every 6 hours PRN Temp greater or equal to 38.5C (101.3F), Mild Pain (1 - 3)  albuterol    90 MICROgram(s) HFA Inhaler 2 Puff(s) Inhalation every 6 hours PRN Shortness of Breath and/or Wheezing  loperamide 2 milliGRAM(s) Oral two times a day PRN Diarrhea  LORazepam     Tablet 0.5 milliGRAM(s) Oral two times a day PRN Anxiety  melatonin 3 milliGRAM(s) Oral at bedtime PRN Insomnia  oxycodone    5 mG/acetaminophen 325 mG 1 Tablet(s) Oral every 6 hours PRN Moderate Pain (4 - 6)      Vital Signs Last 24 Hrs  T(C): 36.4 (02 Jul 2023 11:20), Max: 36.8 (01 Jul 2023 20:26)  T(F): 97.6 (02 Jul 2023 11:20), Max: 98.2 (01 Jul 2023 20:26)  HR: 56 (02 Jul 2023 11:20) (55 - 69)  BP: 118/68 (02 Jul 2023 11:20) (108/61 - 148/68)  BP(mean): --  RR: 18 (02 Jul 2023 11:20) (18 - 20)  SpO2: 97% (02 Jul 2023 11:20) (94% - 97%)    Parameters below as of 02 Jul 2023 11:20  Patient On (Oxygen Delivery Method): nasal cannula        PHYSICAL EXAM:  GENERAL: NAD  HEAD:  Atraumatic, Normocephalic  EYES: EOMI, PERRLA, conjunctiva and sclera clear  NECK: Supple, No JVD  CHEST/LUNG: Clear to auscultation bilaterally; No wheeze  HEART: Regular rate and rhythm; No murmurs, rubs, or gallops  ABDOMEN: Soft, Nontender, Nondistended; Bowel sounds present  EXTREMITIES:  2+ Peripheral Pulses, No clubbing, cyanosis, or edema  PSYCH: AAOx3  NEUROLOGY: non-focal  SKIN: No rashes or lesions    LABS:    07-01    135  |  92<L>  |  18  ----------------------------<  112<H>  3.7   |  29  |  1.27    Ca    10.1      01 Jul 2023 06:19  Mg     1.6     07-01            Urinalysis Basic - ( 01 Jul 2023 06:19 )    Color: x / Appearance: x / SG: x / pH: x  Gluc: 112 mg/dL / Ketone: x  / Bili: x / Urobili: x   Blood: x / Protein: x / Nitrite: x   Leuk Esterase: x / RBC: x / WBC x   Sq Epi: x / Non Sq Epi: x / Bacteria: x          RADIOLOGY & ADDITIONAL TESTS:    Imaging Personally Reviewed:    Consultant(s) Notes Reviewed:      Care Discussed with Consultants/Other Providers:  
Patient is a 72y old  Female who presents with a chief complaint of     SUBJECTIVE / OVERNIGHT EVENTS: c/o chest pain, esophageal spasms. Son at bedside.  Review of Systems  chest pain no  palpitations no  sob no  nausea no  headache no    MEDICATIONS  (STANDING):  amLODIPine   Tablet 5 milliGRAM(s) Oral daily  aspirin  chewable 81 milliGRAM(s) Oral daily  atorvastatin 40 milliGRAM(s) Oral at bedtime  busPIRone 10 milliGRAM(s) Oral two times a day  diltiazem    Tablet 60 milliGRAM(s) Oral every 6 hours  DULoxetine 60 milliGRAM(s) Oral two times a day  enoxaparin Injectable 40 milliGRAM(s) SubCutaneous every 24 hours  furosemide    Tablet 40 milliGRAM(s) Oral daily  gabapentin 300 milliGRAM(s) Oral two times a day  hydrALAZINE 75 milliGRAM(s) Oral two times a day  levothyroxine 125 MICROGram(s) Oral daily  losartan 100 milliGRAM(s) Oral daily  montelukast 10 milliGRAM(s) Oral daily  nystatin Powder 1 Application(s) Topical two times a day  OLANZapine 10 milliGRAM(s) Oral daily  pantoprazole  Injectable 40 milliGRAM(s) IV Push two times a day  pentoxifylline 400 milliGRAM(s) Oral daily  propranolol 20 milliGRAM(s) Oral two times a day  sucralfate 1 Gram(s) Oral four times a day  tiotropium 2.5 MICROgram(s) Inhaler 2 Puff(s) Inhalation daily    MEDICATIONS  (PRN):  acetaminophen     Tablet .. 650 milliGRAM(s) Oral every 6 hours PRN Temp greater or equal to 38.5C (101.3F), Mild Pain (1 - 3)  albuterol    90 MICROgram(s) HFA Inhaler 2 Puff(s) Inhalation every 6 hours PRN Shortness of Breath and/or Wheezing  loperamide 2 milliGRAM(s) Oral two times a day PRN Diarrhea  LORazepam     Tablet 0.5 milliGRAM(s) Oral two times a day PRN Anxiety  melatonin 3 milliGRAM(s) Oral at bedtime PRN Insomnia  oxycodone    5 mG/acetaminophen 325 mG 1 Tablet(s) Oral every 6 hours PRN Moderate Pain (4 - 6)      Vital Signs Last 24 Hrs  T(C): 36.3 (01 Jul 2023 11:36), Max: 37.1 (30 Jun 2023 23:53)  T(F): 97.4 (01 Jul 2023 11:36), Max: 98.8 (30 Jun 2023 23:53)  HR: 61 (01 Jul 2023 11:36) (55 - 72)  BP: 110/68 (01 Jul 2023 11:36) (93/61 - 123/73)  BP(mean): --  RR: 18 (01 Jul 2023 11:36) (18 - 18)  SpO2: 97% (01 Jul 2023 11:36) (94% - 97%)    Parameters below as of 01 Jul 2023 11:36  Patient On (Oxygen Delivery Method): nasal cannula  O2 Flow (L/min): 2      PHYSICAL EXAM:  GENERAL: NAD, well-developed  HEAD:  Atraumatic, Normocephalic  EYES: EOMI, PERRLA, conjunctiva and sclera clear  NECK: Supple, No JVD  CHEST/LUNG: Clear to auscultation bilaterally; No wheeze  HEART: Regular rate and rhythm; No murmurs, rubs, or gallops  ABDOMEN: Soft, Nontender, Nondistended; Bowel sounds present  EXTREMITIES:  2+ Peripheral Pulses, No clubbing, cyanosis, or edema   PSYCH: AAOx3  NEUROLOGY: non-focal  SKIN: No rashes or lesions    LABS:    07-01    135  |  92<L>  |  18  ----------------------------<  112<H>  3.7   |  29  |  1.27    Ca    10.1      01 Jul 2023 06:19  Mg     1.6     07-01    TPro  6.3  /  Alb  3.5  /  TBili  0.9  /  DBili  x   /  AST  12  /  ALT  13  /  AlkPhos  79  06-30          Urinalysis Basic - ( 01 Jul 2023 06:19 )    Color: x / Appearance: x / SG: x / pH: x  Gluc: 112 mg/dL / Ketone: x  / Bili: x / Urobili: x   Blood: x / Protein: x / Nitrite: x   Leuk Esterase: x / RBC: x / WBC x   Sq Epi: x / Non Sq Epi: x / Bacteria: x          RADIOLOGY & ADDITIONAL TESTS:    Imaging Personally Reviewed:    Consultant(s) Notes Reviewed:      Care Discussed with Consultants/Other Providers:  
Patient is a 72y old  Female who presents with a chief complaint of     SUBJECTIVE / OVERNIGHT EVENTS: c/o chest tightness, esophageal spasms, anxious.  Review of Systems    palpitations no  sob no  nausea no  headache no    MEDICATIONS  (STANDING):  amLODIPine   Tablet 5 milliGRAM(s) Oral daily  aspirin enteric coated 81 milliGRAM(s) Oral daily  atorvastatin 40 milliGRAM(s) Oral at bedtime  busPIRone 10 milliGRAM(s) Oral two times a day  diltiazem    Tablet 60 milliGRAM(s) Oral every 6 hours  DULoxetine 60 milliGRAM(s) Oral two times a day  enoxaparin Injectable 40 milliGRAM(s) SubCutaneous every 24 hours  furosemide    Tablet 40 milliGRAM(s) Oral daily  gabapentin 300 milliGRAM(s) Oral two times a day  hydrALAZINE 75 milliGRAM(s) Oral two times a day  levothyroxine 125 MICROGram(s) Oral daily  losartan 100 milliGRAM(s) Oral daily  montelukast 10 milliGRAM(s) Oral daily  OLANZapine 10 milliGRAM(s) Oral daily  pantoprazole  Injectable 40 milliGRAM(s) IV Push two times a day  pentoxifylline 400 milliGRAM(s) Oral daily  potassium chloride    Tablet ER 20 milliEquivalent(s) Oral every 2 hours  propranolol 20 milliGRAM(s) Oral two times a day  sucralfate 1 Gram(s) Oral four times a day  tiotropium 2.5 MICROgram(s) Inhaler 2 Puff(s) Inhalation daily    MEDICATIONS  (PRN):  acetaminophen     Tablet .. 650 milliGRAM(s) Oral every 6 hours PRN Temp greater or equal to 38.5C (101.3F), Mild Pain (1 - 3)  albuterol    90 MICROgram(s) HFA Inhaler 2 Puff(s) Inhalation every 6 hours PRN Shortness of Breath and/or Wheezing  loperamide 2 milliGRAM(s) Oral two times a day PRN Diarrhea  LORazepam     Tablet 0.5 milliGRAM(s) Oral two times a day PRN Anxiety  melatonin 3 milliGRAM(s) Oral at bedtime PRN Insomnia  oxycodone    5 mG/acetaminophen 325 mG 1 Tablet(s) Oral every 6 hours PRN Moderate Pain (4 - 6)      Vital Signs Last 24 Hrs  T(C): 36.6 (29 Jun 2023 12:00), Max: 36.8 (29 Jun 2023 05:45)  T(F): 97.9 (29 Jun 2023 12:00), Max: 98.2 (29 Jun 2023 05:45)  HR: 60 (29 Jun 2023 12:00) (49 - 72)  BP: 105/66 (29 Jun 2023 12:00) (96/46 - 116/63)  BP(mean): 63 (28 Jun 2023 20:58) (63 - 63)  RR: 18 (29 Jun 2023 12:00) (18 - 18)  SpO2: 97% (29 Jun 2023 12:00) (96% - 98%)    Parameters below as of 29 Jun 2023 12:00  Patient On (Oxygen Delivery Method): room air        PHYSICAL EXAM:  GENERAL: NAD  HEAD:  Atraumatic, Normocephalic  EYES: EOMI, PERRLA, conjunctiva and sclera clear  NECK: Supple, No JVD  CHEST/LUNG: Clear to auscultation bilaterally; No wheeze  HEART: Regular rate and rhythm; No murmurs, rubs, or gallops  ABDOMEN: Soft, Nontender, Nondistended; Bowel sounds present  EXTREMITIES:  2+ bipedal edema, less erythema   PSYCH: AAOx3, anxious  NEUROLOGY: non-focal  SKIN: No rashes or lesions    LABS:                        13.3   7.89  )-----------( 147      ( 28 Jun 2023 06:43 )             43.0     06-29    140  |  97  |  13  ----------------------------<  120<H>  3.4<L>   |  29  |  1.02    Ca    9.5      29 Jun 2023 07:06  Mg     1.6     06-29        CARDIAC MARKERS ( 28 Jun 2023 13:44 )  x     / x     / 28 U/L / x     / 1.4 ng/mL      Urinalysis Basic - ( 29 Jun 2023 07:06 )    Color: x / Appearance: x / SG: x / pH: x  Gluc: 120 mg/dL / Ketone: x  / Bili: x / Urobili: x   Blood: x / Protein: x / Nitrite: x   Leuk Esterase: x / RBC: x / WBC x   Sq Epi: x / Non Sq Epi: x / Bacteria: x          RADIOLOGY & ADDITIONAL TESTS:    Imaging Personally Reviewed:    Consultant(s) Notes Reviewed:      Care Discussed with Consultants/Other Providers:  
Patient is a 72y old  Female who presents with a chief complaint of Chest pain    Per chart: "72-year-old female history of COPD on 2 L nasal cannula at home, Graves' disease, HLD, HTN, breast cancer, kidney cancer, skin cancer all in remission presenting with a chief complaint of chest pain x2 months.  Patient states that pain radiates to her back and her mouth feels dry. Patient is also having difficulty swallowing. Patient states that she feels like she is having a choking sensation.  Patient describes symptoms as a tightness.  Patient states that she is coming in today because the symptoms are progressively getting worse.  Patient at baseline is not able to ambulate and does not know if movement makes symptoms worse.  Patient reports shortness of breath as well.  The patient's  is at bedside and reports that the patient also has had leg swelling.   reports that patient has had cellulitis in the past.  And he has noticed that there is increased redness of the leg.  Patient denies any recent illnesses, nausea, vomiting any headaches, visual changes, diarrhea, urinary symptoms."   (05 Jul 2023 14:39)      SUBJECTIVE / OVERNIGHT EVENTS: No new complaints.   Review of Systems  chest pain no  palpitations no  sob no  nausea no  headache no    MEDICATIONS  (STANDING):  amitriptyline 10 milliGRAM(s) Oral at bedtime  amLODIPine   Tablet 5 milliGRAM(s) Oral daily  atorvastatin 40 milliGRAM(s) Oral at bedtime  busPIRone 10 milliGRAM(s) Oral two times a day  DULoxetine 60 milliGRAM(s) Oral two times a day  enoxaparin Injectable 40 milliGRAM(s) SubCutaneous every 24 hours  furosemide    Tablet 40 milliGRAM(s) Oral daily  gabapentin 300 milliGRAM(s) Oral two times a day  hydrALAZINE 75 milliGRAM(s) Oral two times a day  isosorbide   dinitrate Tablet (ISORDIL) 5 milliGRAM(s) Oral two times a day  levothyroxine 125 MICROGram(s) Oral daily  losartan 100 milliGRAM(s) Oral daily  montelukast 10 milliGRAM(s) Oral daily  nystatin Powder 1 Application(s) Topical two times a day  OLANZapine 10 milliGRAM(s) Oral daily  pantoprazole  Injectable 40 milliGRAM(s) IV Push two times a day  pentoxifylline 400 milliGRAM(s) Oral daily  propranolol 20 milliGRAM(s) Oral two times a day  sucralfate 1 Gram(s) Oral four times a day  tiotropium 2.5 MICROgram(s) Inhaler 2 Puff(s) Inhalation daily    MEDICATIONS  (PRN):  acetaminophen     Tablet .. 650 milliGRAM(s) Oral every 6 hours PRN Temp greater or equal to 38.5C (101.3F), Mild Pain (1 - 3)  albuterol    90 MICROgram(s) HFA Inhaler 2 Puff(s) Inhalation every 6 hours PRN Shortness of Breath and/or Wheezing  loperamide 2 milliGRAM(s) Oral two times a day PRN Diarrhea  melatonin 3 milliGRAM(s) Oral at bedtime PRN Insomnia  oxycodone    5 mG/acetaminophen 325 mG 1 Tablet(s) Oral every 6 hours PRN Moderate Pain (4 - 6)      Vital Signs Last 24 Hrs  T(C): 36.5 (05 Jul 2023 12:15), Max: 36.8 (04 Jul 2023 19:58)  T(F): 97.7 (05 Jul 2023 12:15), Max: 98.3 (04 Jul 2023 19:58)  HR: 63 (05 Jul 2023 12:15) (57 - 80)  BP: 125/62 (05 Jul 2023 12:15) (109/72 - 125/62)  BP(mean): --  RR: 19 (05 Jul 2023 12:15) (19 - 20)  SpO2: 95% (05 Jul 2023 12:15) (95% - 99%)    Parameters below as of 05 Jul 2023 12:15  Patient On (Oxygen Delivery Method): nasal cannula  O2 Flow (L/min): 2      PHYSICAL EXAM:  GENERAL: NAD, well-developed  HEAD:  Atraumatic, Normocephalic  EYES: EOMI, PERRLA, conjunctiva and sclera clear  NECK: Supple, No JVD  CHEST/LUNG: Clear to auscultation bilaterally; No wheeze  HEART: Regular rate and rhythm; No murmurs, rubs, or gallops  ABDOMEN: Soft, Nontender, Nondistended; Bowel sounds present  EXTREMITIES:  2+bipedal edema  PSYCH: AAOx3  NEUROLOGY: non-focal  SKIN: No rashes or lesions    LABS:                      RADIOLOGY & ADDITIONAL TESTS:    Imaging Personally Reviewed:    Consultant(s) Notes Reviewed:      Care Discussed with Consultants/Other Providers:  
[facility]  Daniel Marcus PA-C  [address] [place] [address]  [phone]      INTERVAL HPI/OVERNIGHT EVENTS  pt seen and examined, no new events  states chest pain worsening    Allergies    No Known Drug Allergies  Peaches (Hives)  shellfish (Hives)  Grapes (Hives)    Intolerances    MEDICATIONS  (STANDING):  amLODIPine   Tablet 5 milliGRAM(s) Oral daily  aspirin  chewable 81 milliGRAM(s) Oral daily  atorvastatin 40 milliGRAM(s) Oral at bedtime  busPIRone 10 milliGRAM(s) Oral two times a day  diltiazem    Tablet 60 milliGRAM(s) Oral every 6 hours  DULoxetine 60 milliGRAM(s) Oral two times a day  enoxaparin Injectable 40 milliGRAM(s) SubCutaneous every 24 hours  furosemide    Tablet 40 milliGRAM(s) Oral daily  gabapentin 300 milliGRAM(s) Oral two times a day  hydrALAZINE 75 milliGRAM(s) Oral two times a day  levothyroxine 125 MICROGram(s) Oral daily  losartan 100 milliGRAM(s) Oral daily  montelukast 10 milliGRAM(s) Oral daily  OLANZapine 10 milliGRAM(s) Oral daily  pantoprazole  Injectable 40 milliGRAM(s) IV Push two times a day  pentoxifylline 400 milliGRAM(s) Oral daily  propranolol 20 milliGRAM(s) Oral two times a day  sucralfate 1 Gram(s) Oral four times a day  tiotropium 2.5 MICROgram(s) Inhaler 2 Puff(s) Inhalation daily    MEDICATIONS  (PRN):  acetaminophen     Tablet .. 650 milliGRAM(s) Oral every 6 hours PRN Temp greater or equal to 38.5C (101.3F), Mild Pain (1 - 3)  albuterol    90 MICROgram(s) HFA Inhaler 2 Puff(s) Inhalation every 6 hours PRN Shortness of Breath and/or Wheezing  loperamide 2 milliGRAM(s) Oral two times a day PRN Diarrhea  LORazepam     Tablet 0.5 milliGRAM(s) Oral two times a day PRN Anxiety  melatonin 3 milliGRAM(s) Oral at bedtime PRN Insomnia  oxycodone    5 mG/acetaminophen 325 mG 1 Tablet(s) Oral every 6 hours PRN Moderate Pain (4 - 6)  atin 40 milliGRAM(s) Oral at bedtime            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      History of carpal tunnel release  right wrist      History of parathyroidectomy      S/P laminectomy  now bed and wheelchair ridden with severe pain    FAMILY HISTORY:  Family history of myocardial infarction  mother  at age 67 and father at age 67 of MI's    Family history of hypertension  mother and father    Family history of diabetes mellitus (Sibling)  siblings    Family history of heart disease (Sibling)  brother has a PPM    Family history of thyroid cancer (Child)  daughter has thyroid cancer      Social History:  Social History:    Marital Status:  (x   )    (   ) Single    (   )    (  )   Occupation:   Lives with: (  ) alone  (  ) children   ( x ) spouse   (  ) parents  (  ) other    Substance Use (street drugs): ( x ) never used  (  ) other:  Tobacco Usage:  (  x ) never smoked   (   ) former smoker   (   ) current smoker  (     ) pack years  (        ) last cigarette date  Alcohol Usage: no    (     ) Advanced Directives: (     ) None    (      ) DNR    (     ) DNI    (     ) Health Care Proxy: (2023 11:57)    ROS:     General:  No wt loss, fevers, chills, night sweats, fatigue,   Eyes:  Good vision, no reported pain  ENT:  No sore throat, pain, + throat dryness,  runny nose, +dysphagia  CV:  No pain, palpitations, hypo/hypertension  Resp:  No dyspnea, cough, tachypnea,   GI:  No pain, No nausea, No vomiting, +diarrhea IBS, No constipation, No weight loss, No fever, No pruritis, No rectal bleeding, No tarry stools, +dysphagia   :  No pain, bleeding, incontinence, nocturia  Muscle:  no  pain,   Neuro:  No weakness, tingling, memory problems  Psych:  No fatigue, insomnia, mood problems, depression  Endocrine:  No polyuria, polydipsia, cold/heat intolerance  Heme:  No petechiae, ecchymosis, easy bruisability  Skin:  No rash, scars, edema    Physical Exam :  Vital Signs Last 24 Hrs  T(C): 36.7 (2023 04:14), Max: 36.8 (2023 19:58)  T(F): 98 (2023 04:14), Max: 98.3 (2023 19:58)  HR: 57 (2023 04:14) (57 - 80)  BP: 125/54 (2023 04:14) (109/72 - 132/67)  BP(mean): --  RR: 20 (2023 04:14) (18 - 20)  SpO2: 99% (2023 04:14) (95% - 99%)    Parameters below as of 2023 04:14  Patient On (Oxygen Delivery Method): nasal cannula  O2 Flow (L/min): 2    Daily Height in cm: 152.4 (2023 06:04)    Daily     GENERAL:  Appears stated age,   HEENT:  NC/AT,    CHEST:  Full & symmetric excursion,   HEART:  Regular rhythm  ABDOMEN:  Soft, non-tender, non-distended,   EXTEREMITIES:  no cyanosis,clubbing or edema  SKIN:  No rash  NEURO:  Alert,    LABS:        LIVER FUNCTIONS - ( 2023 06:52 )  Alb: 3.7 g/dL / Pro: 6.5 g/dL / ALK PHOS: 86 U/L / ALT: 13 U/L / AST: 12 U/L / GGT: x         Urinalysis Basic - ( 2023 06:52 )    Color: x / Appearance: x / SG: x / pH: x  Gluc: 124 mg/dL / Ketone: x  / Bili: x / Urobili: x   Blood: x / Protein: x / Nitrite: x   Leuk Esterase: x / RBC: x / WBC x   Sq Epi: x / Non Sq Epi: x / Bacteria: x  Imaging:    
[facility]  Daniel Marcus PA-C  [address] [place] [address]  [phone]      INTERVAL HPI/OVERNIGHT EVENTS  pt s/e   Reports feeling better, denies having abd. pain   currently no trouble swallowing   pending speech and swallow eval  loose bmx2     Allergies    No Known Drug Allergies  Peaches (Hives)  shellfish (Hives)  Grapes (Hives)    Intolerances      MEDICATIONS  (STANDING):  amLODIPine   Tablet 5 milliGRAM(s) Oral daily  aspirin enteric coated 81 milliGRAM(s) Oral daily  atorvastatin 40 milliGRAM(s) Oral at bedtime  busPIRone 10 milliGRAM(s) Oral two times a day  DULoxetine 60 milliGRAM(s) Oral two times a day  enoxaparin Injectable 40 milliGRAM(s) SubCutaneous every 24 hours  furosemide   Injectable 40 milliGRAM(s) IV Push daily  gabapentin 300 milliGRAM(s) Oral two times a day  hydrALAZINE 75 milliGRAM(s) Oral two times a day  levothyroxine 125 MICROGram(s) Oral daily  losartan 100 milliGRAM(s) Oral daily  montelukast 10 milliGRAM(s) Oral daily  OLANZapine 10 milliGRAM(s) Oral daily  pantoprazole    Tablet 40 milliGRAM(s) Oral before breakfast  pentoxifylline 400 milliGRAM(s) Oral daily  propranolol 20 milliGRAM(s) Oral two times a day  tiotropium 2.5 MICROgram(s) Inhaler 2 Puff(s) Inhalation daily    MEDICATIONS  (PRN):  acetaminophen     Tablet .. 650 milliGRAM(s) Oral every 6 hours PRN Temp greater or equal to 38.5C (101.3F), Mild Pain (1 - 3)  melatonin 3 milliGRAM(s) Oral at bedtime PRN Insomnia  oxycodone    5 mG/acetaminophen 325 mG 1 Tablet(s) Oral every 6 hours PRN Moderate Pain (4 - 6)            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      History of carpal tunnel release  right wrist      History of parathyroidectomy      S/P laminectomy  now bed and wheelchair ridden with severe pain    FAMILY HISTORY:  Family history of myocardial infarction  mother  at age 67 and father at age 67 of MI's    Family history of hypertension  mother and father    Family history of diabetes mellitus (Sibling)  siblings    Family history of heart disease (Sibling)  brother has a PPM    Family history of thyroid cancer (Child)  daughter has thyroid cancer      Social History:  Social History:    Marital Status:  (x   )    (   ) Single    (   )    (  )   Occupation:   Lives with: (  ) alone  (  ) children   ( x ) spouse   (  ) parents  (  ) other    Substance Use (street drugs): ( x ) never used  (  ) other:  Tobacco Usage:  (  x ) never smoked   (   ) former smoker   (   ) current smoker  (     ) pack years  (        ) last cigarette date  Alcohol Usage: no    (     ) Advanced Directives: (     ) None    (      ) DNR    (     ) DNI    (     ) Health Care Proxy: (2023 11:57)    ROS:     General:  No wt loss, fevers, chills, night sweats, fatigue,   Eyes:  Good vision, no reported pain  ENT:  No sore throat, pain, + throat dryness,  runny nose, +dysphagia  CV:  No pain, palpitations, hypo/hypertension  Resp:  No dyspnea, cough, tachypnea,   GI:  No pain, No nausea, No vomiting, +diarrhea IBS, No constipation, No weight loss, No fever, No pruritis, No rectal bleeding, No tarry stools  :  No pain, bleeding, incontinence, nocturia  Muscle:  no  pain,   Neuro:  No weakness, tingling, memory problems  Psych:  No fatigue, insomnia, mood problems, depression  Endocrine:  No polyuria, polydipsia, cold/heat intolerance  Heme:  No petechiae, ecchymosis, easy bruisability  Skin:  No rash, scars, edema    Physical Exam :  Vital Signs Last 24 Hrs  T(C): 36.6 (2023 09:00), Max: 36.6 (2023 20:35)  T(F): 97.9 (2023 09:00), Max: 97.9 (2023 09:00)  HR: 73 (2023 09:00) (56 - 80)  BP: 142/57 (2023 09:00) (123/73 - 176/108)  BP(mean): --  RR: 18 (2023 09:00) (18 - 18)  SpO2: 97% (2023 09:00) (93% - 97%)    Parameters below as of 2023 09:00  Patient On (Oxygen Delivery Method): nasal cannula  O2 Flow (L/min): 2    Daily Height in cm: 152.4 (2023 06:04)    Daily     GENERAL:  Appears stated age,   HEENT:  NC/AT,    CHEST:  Full & symmetric excursion,   HEART:  Regular rhythm  ABDOMEN:  Soft, non-tender, non-distended,   EXTEREMITIES:  no cyanosis,clubbing or edema  SKIN:  No rash  NEURO:  Alert,    LABS:                                   13.5   10.42 )-----------( 146      ( 2023 09:04 )             42.4     06-25    141  |  101  |  10  ----------------------------<  80  4.0   |  21<L>  |  0.66    Ca    9.7      2023 06:46    TPro  6.5  /  Alb  3.7  /  TBili  0.9  /  DBili  x   /  AST  12  /  ALT  13  /  AlkPhos  86  06-24      LIVER FUNCTIONS - ( 2023 06:52 )  Alb: 3.7 g/dL / Pro: 6.5 g/dL / ALK PHOS: 86 U/L / ALT: 13 U/L / AST: 12 U/L / GGT: x         Urinalysis Basic - ( 2023 06:52 )    Color: x / Appearance: x / SG: x / pH: x  Gluc: 124 mg/dL / Ketone: x  / Bili: x / Urobili: x   Blood: x / Protein: x / Nitrite: x   Leuk Esterase: x / RBC: x / WBC x   Sq Epi: x / Non Sq Epi: x / Bacteria: x  Imaging:    
[facility]  Daniel Marcus PA-C  [address] [place] [address]  [phone]      INTERVAL HPI/OVERNIGHT EVENTS  pt s/e, no new events   feels better today, had some burning in chest this am   also with diarrhea after eating   esophagram noted    Allergies    No Known Drug Allergies  Peaches (Hives)  shellfish (Hives)  Grapes (Hives)    Intolerances      MEDICATIONS  (STANDING):  amLODIPine   Tablet 5 milliGRAM(s) Oral daily  aspirin enteric coated 81 milliGRAM(s) Oral daily  atorvastatin 40 milliGRAM(s) Oral at bedtime  busPIRone 10 milliGRAM(s) Oral two times a day  DULoxetine 60 milliGRAM(s) Oral two times a day  enoxaparin Injectable 40 milliGRAM(s) SubCutaneous every 24 hours  furosemide   Injectable 40 milliGRAM(s) IV Push daily  gabapentin 300 milliGRAM(s) Oral two times a day  hydrALAZINE 75 milliGRAM(s) Oral two times a day  levothyroxine 125 MICROGram(s) Oral daily  losartan 100 milliGRAM(s) Oral daily  montelukast 10 milliGRAM(s) Oral daily  OLANZapine 10 milliGRAM(s) Oral daily  pantoprazole    Tablet 40 milliGRAM(s) Oral before breakfast  pentoxifylline 400 milliGRAM(s) Oral daily  propranolol 20 milliGRAM(s) Oral two times a day  tiotropium 2.5 MICROgram(s) Inhaler 2 Puff(s) Inhalation daily    MEDICATIONS  (PRN):  acetaminophen     Tablet .. 650 milliGRAM(s) Oral every 6 hours PRN Temp greater or equal to 38.5C (101.3F), Mild Pain (1 - 3)  melatonin 3 milliGRAM(s) Oral at bedtime PRN Insomnia  oxycodone    5 mG/acetaminophen 325 mG 1 Tablet(s) Oral every 6 hours PRN Moderate Pain (4 - 6)            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      History of carpal tunnel release  right wrist      History of parathyroidectomy      S/P laminectomy  now bed and wheelchair ridden with severe pain    FAMILY HISTORY:  Family history of myocardial infarction  mother  at age 67 and father at age 67 of MI's    Family history of hypertension  mother and father    Family history of diabetes mellitus (Sibling)  siblings    Family history of heart disease (Sibling)  brother has a PPM    Family history of thyroid cancer (Child)  daughter has thyroid cancer      Social History:  Social History:    Marital Status:  (x   )    (   ) Single    (   )    (  )   Occupation:   Lives with: (  ) alone  (  ) children   ( x ) spouse   (  ) parents  (  ) other    Substance Use (street drugs): ( x ) never used  (  ) other:  Tobacco Usage:  (  x ) never smoked   (   ) former smoker   (   ) current smoker  (     ) pack years  (        ) last cigarette date  Alcohol Usage: no    (     ) Advanced Directives: (     ) None    (      ) DNR    (     ) DNI    (     ) Health Care Proxy: (2023 11:57)    ROS:     General:  No wt loss, fevers, chills, night sweats, fatigue,   Eyes:  Good vision, no reported pain  ENT:  No sore throat, pain, + throat dryness,  runny nose, +dysphagia  CV:  No pain, palpitations, hypo/hypertension  Resp:  No dyspnea, cough, tachypnea,   GI:  No pain, No nausea, No vomiting, +diarrhea IBS, No constipation, No weight loss, No fever, No pruritis, No rectal bleeding, No tarry stools, +dysphagia   :  No pain, bleeding, incontinence, nocturia  Muscle:  no  pain,   Neuro:  No weakness, tingling, memory problems  Psych:  No fatigue, insomnia, mood problems, depression  Endocrine:  No polyuria, polydipsia, cold/heat intolerance  Heme:  No petechiae, ecchymosis, easy bruisability  Skin:  No rash, scars, edema    Physical Exam :  Vital Signs Last 24 Hrs  T(C): 36.8 (2023 06:24), Max: 36.8 (2023 06:24)  T(F): 98.2 (2023 06:24), Max: 98.2 (2023 06:24)  HR: 68 (2023 06:24) (64 - 73)  BP: 118/74 (2023 06:24) (118/71 - 150/81)  BP(mean): --  RR: 18 (2023 06:24) (18 - 18)  SpO2: 95% (2023 06:24) (94% - 98%)    Parameters below as of 2023 06:24  Patient On (Oxygen Delivery Method): nasal cannula  O2 Flow (L/min): 2        Daily Height in cm: 152.4 (2023 06:04)    Daily     GENERAL:  Appears stated age,   HEENT:  NC/AT,    CHEST:  Full & symmetric excursion,   HEART:  Regular rhythm  ABDOMEN:  Soft, non-tender, non-distended,   EXTEREMITIES:  no cyanosis,clubbing or edema  SKIN:  No rash  NEURO:  Alert,    LABS:      140  |  98  |  12  ----------------------------<  129<H>  3.9   |  25  |  0.94    Ca    10.1      2023 09:04  Mg     1.8           LIVER FUNCTIONS - ( 2023 06:52 )  Alb: 3.7 g/dL / Pro: 6.5 g/dL / ALK PHOS: 86 U/L / ALT: 13 U/L / AST: 12 U/L / GGT: x         Urinalysis Basic - ( 2023 06:52 )    Color: x / Appearance: x / SG: x / pH: x  Gluc: 124 mg/dL / Ketone: x  / Bili: x / Urobili: x   Blood: x / Protein: x / Nitrite: x   Leuk Esterase: x / RBC: x / WBC x   Sq Epi: x / Non Sq Epi: x / Bacteria: x  Imaging:

## 2023-07-06 NOTE — DISCHARGE NOTE NURSING/CASE MANAGEMENT/SOCIAL WORK - PATIENT PORTAL LINK FT
You can access the FollowMyHealth Patient Portal offered by [facility] by registering at the following website: [email or web address]. By joining [facility]’s FollowMyHealth portal, you will also be able to view your health information using other applications (apps) compatible with our system.

## 2023-07-06 NOTE — PROGRESS NOTE ADULT - ASSESSMENT
Dysphagia  IBS  Diarrhea    Speech and swallow eval.  noted  diet per SLP recommendations   Xray Esophagram with dysmotility; diet modification and importance of soft food d/w pt  resume dilt  d/c isosorbide  cont  amitriptyline 10mg at bedtime for spasms   will need outpatient manometry testing  stop oral potassium and asa if no strong indication, will make spasms worse  PPI BID  GI PCR negative  imodium prn diarrhea   aspiration precautions  will need outpatient GI follow up   d/w pt   d/w medicine       I reviewed the overnight course of events on the unit, re-confirming the patient history. I discussed the care with the patient and their family  The plan of care was discussed with the physician assistant and modifications were made to the notation where appropriate.   Differential diagnosis and plan of care discussed with patient after the evaluation  35 minutes spent on total encounter of which more than fifty percent of the encounter was spent counseling and/or coordinating care by the attending physician.  Advanced care planning was discussed with patient and family.  Advanced care planning forms were reviewed and discussed.  Risks, benefits and alternatives of gastroenterologic procedures were discussed in detail and all questions were answered.

## 2023-07-06 NOTE — PROGRESS NOTE ADULT - PROVIDER SPECIALTY LIST ADULT
Cardiology
Gastroenterology
Gastroenterology
Internal Medicine
Cardiology
Gastroenterology
Internal Medicine
Gastroenterology
Internal Medicine

## 2023-07-06 NOTE — CHART NOTE - NSCHARTNOTESELECT_GEN_ALL_CORE
Inpatient Referrals/Event Note Pt would like to proceed with blood twice, her condition have gotten bad. Please follow up

## 2023-07-06 NOTE — PROGRESS NOTE ADULT - NUTRITIONAL ASSESSMENT
This patient has been assessed with a concern for Malnutrition and has been determined to have a diagnosis/diagnoses of Morbid obesity (BMI > 40).    This patient is being managed with:   Diet Pureed-  Supplement Feeding Modality:  Oral  Ensure Enlive Cans or Servings Per Day:  2       Frequency:  Daily  Entered: Jul 5 2023  6:33PM    Diet Pureed-  Supplement Feeding Modality:  Oral  Ensure Plus High Protein Cans or Servings Per Day:  2       Frequency:  Daily  Entered: Jul 5 2023  3:12PM    The following pending diet order is being considered for treatment of Morbid obesity (BMI > 40):null

## 2023-07-06 NOTE — DISCHARGE NOTE NURSING/CASE MANAGEMENT/SOCIAL WORK - NSDCPEFALRISK_GEN_ALL_CORE
For information on Fall & Injury Prevention, visit: [email or web address] OR  [email or web address] OR  https://www.cdc.gov/steadi/patient.html

## 2023-07-06 NOTE — DISCHARGE NOTE NURSING/CASE MANAGEMENT/SOCIAL WORK - NSDCVIVACCINE_GEN_ALL_CORE_FT
Tdap; 19-Nov-2019 16:31; Christine Skaggs (RN); [employer]; [id number] (Exp. Date: 17-Sep-2021); IntraMuscular; Deltoid Left.; 0.5 milliLiter(s); VIS (VIS Published: 09-May-2013, VIS Presented: 19-Nov-2019);

## 2023-07-06 NOTE — PROGRESS NOTE ADULT - ASSESSMENT
72 f with    Acute on Chronic Systolic Congestive Heart Failure  - telemetry  - diurese  - cardiology follow    Back pain  - pain control  - Xray C T spine noted    Dysphagia/ Esophageal spasms  - esophagogram noted  - CCB for esophageal spasms  - esophageal manometry as OTP  - Gastroenterology follow     Legs Dermatitis  - observe off antibiotic  - steroid cream   - observe    Anxiety / Depression   - control  - Psych evaluation  - Ativan 0.5 mg bid prn    Asthma   - nebs    Breast cancer in situ, left   - stable. Follow as OTP with Oncology    COPD (chronic obstructive pulmonary disease)   - nebs    Graves disease   - follow TSH    Hyperlipidemia   - stable    Hypertension   - control    Hypothyroid   - continue supplementation    Kidney cancer, primary, with metastasis from kidney to other site, left LEft sided- s/p nephrectomy only- no chemo or RT  - stable    Obesity   - Nutrition education    DVT prophylaxis  - AC     DCP in progress home.     d/w patient,     Nathan Mora MD phone [phone]

## 2023-07-12 ENCOUNTER — TRANSCRIPTION ENCOUNTER (OUTPATIENT)
Age: 73
End: 2023-07-12

## 2023-07-17 ENCOUNTER — APPOINTMENT (OUTPATIENT)
Dept: GASTROENTEROLOGY | Facility: CLINIC | Age: 73
End: 2023-07-17

## 2023-07-20 PROCEDURE — 99443: CPT | Mod: 95

## 2023-07-25 ENCOUNTER — INPATIENT (INPATIENT)
Facility: HOSPITAL | Age: 73
LOS: 6 days | Discharge: HOME HEALTH | DRG: 603 | End: 2023-08-01
Attending: INTERNAL MEDICINE | Admitting: INTERNAL MEDICINE
Payer: MEDICARE

## 2023-07-25 VITALS
DIASTOLIC BLOOD PRESSURE: 74 MMHG | SYSTOLIC BLOOD PRESSURE: 120 MMHG | RESPIRATION RATE: 20 BRPM | WEIGHT: 199.96 LBS | OXYGEN SATURATION: 98 % | HEIGHT: 60 IN | TEMPERATURE: 98 F | HEART RATE: 73 BPM

## 2023-07-25 DIAGNOSIS — Z98.89 OTHER SPECIFIED POSTPROCEDURAL STATES: Chronic | ICD-10-CM

## 2023-07-25 DIAGNOSIS — L03.90 CELLULITIS, UNSPECIFIED: ICD-10-CM

## 2023-07-25 DIAGNOSIS — Z90.5 ACQUIRED ABSENCE OF KIDNEY: Chronic | ICD-10-CM

## 2023-07-25 DIAGNOSIS — L03.115 CELLULITIS OF RIGHT LOWER LIMB: ICD-10-CM

## 2023-07-25 DIAGNOSIS — Z90.49 ACQUIRED ABSENCE OF OTHER SPECIFIED PARTS OF DIGESTIVE TRACT: Chronic | ICD-10-CM

## 2023-07-25 LAB
ALBUMIN SERPL ELPH-MCNC: 3.8 G/DL — SIGNIFICANT CHANGE UP (ref 3.3–5)
ALP SERPL-CCNC: 102 U/L — SIGNIFICANT CHANGE UP (ref 40–120)
ALT FLD-CCNC: 27 U/L — SIGNIFICANT CHANGE UP (ref 10–45)
ANION GAP SERPL CALC-SCNC: 13 MMOL/L — SIGNIFICANT CHANGE UP (ref 5–17)
APTT BLD: 45.6 SEC — HIGH (ref 24.5–35.6)
AST SERPL-CCNC: 22 U/L — SIGNIFICANT CHANGE UP (ref 10–40)
BASOPHILS # BLD AUTO: 0.06 K/UL — SIGNIFICANT CHANGE UP (ref 0–0.2)
BASOPHILS NFR BLD AUTO: 0.7 % — SIGNIFICANT CHANGE UP (ref 0–2)
BILIRUB SERPL-MCNC: 0.8 MG/DL — SIGNIFICANT CHANGE UP (ref 0.2–1.2)
BUN SERPL-MCNC: 15 MG/DL — SIGNIFICANT CHANGE UP (ref 7–23)
CALCIUM SERPL-MCNC: 10.1 MG/DL — SIGNIFICANT CHANGE UP (ref 8.4–10.5)
CHLORIDE SERPL-SCNC: 104 MMOL/L — SIGNIFICANT CHANGE UP (ref 96–108)
CO2 SERPL-SCNC: 23 MMOL/L — SIGNIFICANT CHANGE UP (ref 22–31)
CREAT SERPL-MCNC: 0.64 MG/DL — SIGNIFICANT CHANGE UP (ref 0.5–1.3)
EGFR: 93 ML/MIN/1.73M2 — SIGNIFICANT CHANGE UP
EOSINOPHIL # BLD AUTO: 0.3 K/UL — SIGNIFICANT CHANGE UP (ref 0–0.5)
EOSINOPHIL NFR BLD AUTO: 3.3 % — SIGNIFICANT CHANGE UP (ref 0–6)
GLUCOSE SERPL-MCNC: 119 MG/DL — HIGH (ref 70–99)
HCT VFR BLD CALC: 40.2 % — SIGNIFICANT CHANGE UP (ref 34.5–45)
HGB BLD-MCNC: 12.7 G/DL — SIGNIFICANT CHANGE UP (ref 11.5–15.5)
IMM GRANULOCYTES NFR BLD AUTO: 0.3 % — SIGNIFICANT CHANGE UP (ref 0–0.9)
INR BLD: 0.97 RATIO — SIGNIFICANT CHANGE UP (ref 0.85–1.18)
LYMPHOCYTES # BLD AUTO: 1.89 K/UL — SIGNIFICANT CHANGE UP (ref 1–3.3)
LYMPHOCYTES # BLD AUTO: 20.5 % — SIGNIFICANT CHANGE UP (ref 13–44)
MCHC RBC-ENTMCNC: 31.5 PG — SIGNIFICANT CHANGE UP (ref 27–34)
MCHC RBC-ENTMCNC: 31.6 GM/DL — LOW (ref 32–36)
MCV RBC AUTO: 99.8 FL — SIGNIFICANT CHANGE UP (ref 80–100)
MONOCYTES # BLD AUTO: 0.89 K/UL — SIGNIFICANT CHANGE UP (ref 0–0.9)
MONOCYTES NFR BLD AUTO: 9.7 % — SIGNIFICANT CHANGE UP (ref 2–14)
NEUTROPHILS # BLD AUTO: 6.04 K/UL — SIGNIFICANT CHANGE UP (ref 1.8–7.4)
NEUTROPHILS NFR BLD AUTO: 65.5 % — SIGNIFICANT CHANGE UP (ref 43–77)
NRBC # BLD: 0 /100 WBCS — SIGNIFICANT CHANGE UP (ref 0–0)
NT-PROBNP SERPL-SCNC: 96 PG/ML — SIGNIFICANT CHANGE UP (ref 0–300)
PLATELET # BLD AUTO: 150 K/UL — SIGNIFICANT CHANGE UP (ref 150–400)
POTASSIUM SERPL-MCNC: 4.8 MMOL/L — SIGNIFICANT CHANGE UP (ref 3.5–5.3)
POTASSIUM SERPL-SCNC: 4.8 MMOL/L — SIGNIFICANT CHANGE UP (ref 3.5–5.3)
PROT SERPL-MCNC: 7.2 G/DL — SIGNIFICANT CHANGE UP (ref 6–8.3)
PROTHROM AB SERPL-ACNC: 11.2 SEC — SIGNIFICANT CHANGE UP (ref 9.5–13)
RBC # BLD: 4.03 M/UL — SIGNIFICANT CHANGE UP (ref 3.8–5.2)
RBC # FLD: 13.6 % — SIGNIFICANT CHANGE UP (ref 10.3–14.5)
SODIUM SERPL-SCNC: 140 MMOL/L — SIGNIFICANT CHANGE UP (ref 135–145)
TROPONIN T, HIGH SENSITIVITY RESULT: 24 NG/L — SIGNIFICANT CHANGE UP (ref 0–51)
WBC # BLD: 9.21 K/UL — SIGNIFICANT CHANGE UP (ref 3.8–10.5)
WBC # FLD AUTO: 9.21 K/UL — SIGNIFICANT CHANGE UP (ref 3.8–10.5)

## 2023-07-25 PROCEDURE — 93970 EXTREMITY STUDY: CPT | Mod: 26

## 2023-07-25 PROCEDURE — 71045 X-RAY EXAM CHEST 1 VIEW: CPT | Mod: 26

## 2023-07-25 PROCEDURE — 99221 1ST HOSP IP/OBS SF/LOW 40: CPT

## 2023-07-25 PROCEDURE — 99285 EMERGENCY DEPT VISIT HI MDM: CPT | Mod: FS

## 2023-07-25 RX ORDER — GABAPENTIN 400 MG/1
300 CAPSULE ORAL
Refills: 0 | Status: DISCONTINUED | OUTPATIENT
Start: 2023-07-25 | End: 2023-08-01

## 2023-07-25 RX ORDER — CEFAZOLIN SODIUM 1 G
2000 VIAL (EA) INJECTION EVERY 8 HOURS
Refills: 0 | Status: DISCONTINUED | OUTPATIENT
Start: 2023-07-25 | End: 2023-08-01

## 2023-07-25 RX ORDER — TIOTROPIUM BROMIDE 18 UG/1
2 CAPSULE ORAL; RESPIRATORY (INHALATION) DAILY
Refills: 0 | Status: DISCONTINUED | OUTPATIENT
Start: 2023-07-25 | End: 2023-08-01

## 2023-07-25 RX ORDER — ALBUTEROL 90 UG/1
2 AEROSOL, METERED ORAL EVERY 6 HOURS
Refills: 0 | Status: DISCONTINUED | OUTPATIENT
Start: 2023-07-25 | End: 2023-08-01

## 2023-07-25 RX ORDER — AMITRIPTYLINE HCL 25 MG
10 TABLET ORAL AT BEDTIME
Refills: 0 | Status: DISCONTINUED | OUTPATIENT
Start: 2023-07-25 | End: 2023-08-01

## 2023-07-25 RX ORDER — DULOXETINE HYDROCHLORIDE 30 MG/1
1 CAPSULE, DELAYED RELEASE ORAL
Qty: 0 | Refills: 0 | DISCHARGE

## 2023-07-25 RX ORDER — VANCOMYCIN HCL 1 G
1000 VIAL (EA) INTRAVENOUS ONCE
Refills: 0 | Status: COMPLETED | OUTPATIENT
Start: 2023-07-25 | End: 2023-07-25

## 2023-07-25 RX ORDER — DULOXETINE HYDROCHLORIDE 30 MG/1
60 CAPSULE, DELAYED RELEASE ORAL DAILY
Refills: 0 | Status: DISCONTINUED | OUTPATIENT
Start: 2023-07-25 | End: 2023-08-01

## 2023-07-25 RX ORDER — LEVOTHYROXINE SODIUM 125 MCG
125 TABLET ORAL DAILY
Refills: 0 | Status: DISCONTINUED | OUTPATIENT
Start: 2023-07-25 | End: 2023-08-01

## 2023-07-25 RX ORDER — ATORVASTATIN CALCIUM 80 MG/1
40 TABLET, FILM COATED ORAL AT BEDTIME
Refills: 0 | Status: DISCONTINUED | OUTPATIENT
Start: 2023-07-25 | End: 2023-08-01

## 2023-07-25 RX ORDER — FUROSEMIDE 40 MG
40 TABLET ORAL DAILY
Refills: 0 | Status: DISCONTINUED | OUTPATIENT
Start: 2023-07-25 | End: 2023-08-01

## 2023-07-25 RX ORDER — DILTIAZEM HCL 120 MG
60 CAPSULE, EXT RELEASE 24 HR ORAL EVERY 6 HOURS
Refills: 0 | Status: DISCONTINUED | OUTPATIENT
Start: 2023-07-25 | End: 2023-08-01

## 2023-07-25 RX ORDER — ENOXAPARIN SODIUM 100 MG/ML
40 INJECTION SUBCUTANEOUS EVERY 24 HOURS
Refills: 0 | Status: DISCONTINUED | OUTPATIENT
Start: 2023-07-25 | End: 2023-08-01

## 2023-07-25 RX ORDER — HYDRALAZINE HCL 50 MG
75 TABLET ORAL
Refills: 0 | Status: DISCONTINUED | OUTPATIENT
Start: 2023-07-25 | End: 2023-08-01

## 2023-07-25 RX ORDER — OLANZAPINE 15 MG/1
10 TABLET, FILM COATED ORAL DAILY
Refills: 0 | Status: DISCONTINUED | OUTPATIENT
Start: 2023-07-25 | End: 2023-08-01

## 2023-07-25 RX ORDER — LANOLIN ALCOHOL/MO/W.PET/CERES
3 CREAM (GRAM) TOPICAL AT BEDTIME
Refills: 0 | Status: DISCONTINUED | OUTPATIENT
Start: 2023-07-25 | End: 2023-08-01

## 2023-07-25 RX ORDER — CEFAZOLIN SODIUM 1 G
1000 VIAL (EA) INJECTION EVERY 8 HOURS
Refills: 0 | Status: DISCONTINUED | OUTPATIENT
Start: 2023-07-25 | End: 2023-07-25

## 2023-07-25 RX ORDER — ACETAMINOPHEN 500 MG
650 TABLET ORAL EVERY 6 HOURS
Refills: 0 | Status: DISCONTINUED | OUTPATIENT
Start: 2023-07-25 | End: 2023-08-01

## 2023-07-25 RX ORDER — SUCRALFATE 1 G
1 TABLET ORAL
Refills: 0 | Status: DISCONTINUED | OUTPATIENT
Start: 2023-07-25 | End: 2023-08-01

## 2023-07-25 RX ORDER — LOSARTAN POTASSIUM 100 MG/1
100 TABLET, FILM COATED ORAL DAILY
Refills: 0 | Status: DISCONTINUED | OUTPATIENT
Start: 2023-07-25 | End: 2023-08-01

## 2023-07-25 RX ORDER — MONTELUKAST 4 MG/1
10 TABLET, CHEWABLE ORAL DAILY
Refills: 0 | Status: DISCONTINUED | OUTPATIENT
Start: 2023-07-25 | End: 2023-08-01

## 2023-07-25 RX ADMIN — Medication 75 MILLIGRAM(S): at 19:06

## 2023-07-25 RX ADMIN — Medication 1 GRAM(S): at 23:15

## 2023-07-25 RX ADMIN — Medication 250 MILLIGRAM(S): at 11:54

## 2023-07-25 RX ADMIN — Medication 60 MILLIGRAM(S): at 23:14

## 2023-07-25 RX ADMIN — Medication 10 MILLIGRAM(S): at 21:36

## 2023-07-25 RX ADMIN — Medication 1 GRAM(S): at 19:07

## 2023-07-25 RX ADMIN — Medication 100 MILLIGRAM(S): at 21:37

## 2023-07-25 RX ADMIN — ATORVASTATIN CALCIUM 40 MILLIGRAM(S): 80 TABLET, FILM COATED ORAL at 21:36

## 2023-07-25 RX ADMIN — Medication 10 MILLIGRAM(S): at 19:17

## 2023-07-25 RX ADMIN — ENOXAPARIN SODIUM 40 MILLIGRAM(S): 100 INJECTION SUBCUTANEOUS at 19:05

## 2023-07-25 RX ADMIN — GABAPENTIN 300 MILLIGRAM(S): 400 CAPSULE ORAL at 19:08

## 2023-07-25 NOTE — CONSULT NOTE ADULT - NSCONSULTADDITIONALINFOA_GEN_ALL_CORE
Michael I. Oppenheim, MD  Division of Infectious Diseases  Reachable on Teams  Pager: [phone]  O: [phone] if nights/weekends/no response

## 2023-07-25 NOTE — CONSULT NOTE ADULT - ASSESSMENT
72 year old with known lymphedema now with skin changes very consistent with bilateral cellulitis, negative MRSA PCR in 3/2023

## 2023-07-25 NOTE — ED PROVIDER NOTE - OBJECTIVE STATEMENT
72-year-old female with past medical history of COPD on 2 L of nasal cannula at home, wheelchair dependent, Graves' disease, HLD, HTN, breast cancer, kidney cancer here for evaluation of bilateral leg swelling and erythema over the past 3 days.  Patient was hospitalized here earlier in the month for similar symptoms of the left lower extremity.  She has been home she has been doing well however noticed 3 days ago started having erythema of the posterior aspect of the left lower extremity first, then her right lower extremity, both of which have progressed up to her knees.  She also endorses generalized weakness.  She denies any fevers/chills, no involvement up into the groin, no open sores on  her skin, no abdominal pain, chest pain, shortness of breath, difficulty breathing.

## 2023-07-25 NOTE — ED ADULT NURSE NOTE - OBJECTIVE STATEMENT
73 y/o female BIBA with c/o b/l lower leg swelling. A&Ox4. Wheelchair bound. PMH COPD on 2L NC at baseline, asthma, baseline tremor in b/l upper extremities, anxiety. Patient endorses she was recently dc from the hospital for b/l lower extremity cellulitis. Patient states the swelling and redness had gone away, and 3 days ago endorses the pain and redness began again. Patient endorses it has been worse in the left leg than the right. Patient endorses no fevers, chills, or Discharge from the b/l legs. Patient has redness, swelling, and heat noted to the b/l lower extremities. Patient denies chest pain, fever, chills, n/v/d, urinary symptoms, abdominal pain.

## 2023-07-25 NOTE — H&P ADULT - ASSESSMENT
72 f with     Bilateral cellulitis legs vs Dermatitis   - blood cultures  - antibiotic  - ID evaluation called    Acute on Chronic Systolic Congestive Heart Failure  - diurese  - cardiology follow    Back pain  - pain control    Dysphagia/ Esophageal spasms  - CCB for esophageal spasms  - esophageal manometry as OTP    Anxiety / Depression   - control  - Ativan 0.5 mg bid prn    Breast cancer in situ, left   - stable. Follow as OTP with Oncology    COPD (chronic obstructive pulmonary disease)   - nebs    Graves disease/  Hypothyroid   - continue supplementation  - follow TSH    Hyperlipidemia   - stable    Hypertension   - control    Kidney cancer, primary, with metastasis from kidney to other site, left LEft sided- s/p nephrectomy only- no chemo or RT  - stable    Obesity   - Nutrition education    DVT prophylaxis  - AC     d/w patient,     Nathan Mora MD phone [phone]

## 2023-07-25 NOTE — H&P ADULT - NSHPPHYSICALEXAM_GEN_ALL_CORE
PHYSICAL EXAMINATION:  Vital Signs Last 24 Hrs  T(C): 36.8 (25 Jul 2023 10:59), Max: 36.8 (25 Jul 2023 10:59)  T(F): 98.2 (25 Jul 2023 10:59), Max: 98.2 (25 Jul 2023 10:59)  HR: 73 (25 Jul 2023 10:59) (73 - 73)  BP: 120/74 (25 Jul 2023 10:59) (120/74 - 120/74)  BP(mean): --  RR: 20 (25 Jul 2023 10:59) (20 - 20)  SpO2: 98% (25 Jul 2023 10:59) (98% - 98%)    Parameters below as of 25 Jul 2023 10:59  Patient On (Oxygen Delivery Method): nasal cannula  O2 Flow (L/min): 2    CAPILLARY BLOOD GLUCOSE          GENERAL: NAD   HEAD:  atraumatic, normocephalic  EYES: sclera anicteric  ENMT: mucous membranes moist  NECK: supple, No JVD  CHEST/LUNG: clear to auscultation bilaterally; no rales, rhonchi, or wheezing b/l  HEART: normal S1, S2  ABDOMEN: BS+, soft, ND, NT   EXTREMITIES: bilateral legs erythema tender + 2-3 edema b/l LEs  NEURO: awake, alert, interactive; moves all extremities, anxious   SKIN: no rashes or lesions

## 2023-07-25 NOTE — H&P ADULT - NSHPLABSRESULTS_GEN_ALL_CORE
12.7   9.21  )-----------( 150      ( 25 Jul 2023 11:32 )             40.2       07-25    140  |  104  |  15  ----------------------------<  119<H>  4.8   |  23  |  0.64    Ca    10.1      25 Jul 2023 11:32    TPro  7.2  /  Alb  3.8  /  TBili  0.8  /  DBili  x   /  AST  22  /  ALT  27  /  AlkPhos  102  07-25              Urinalysis Basic - ( 25 Jul 2023 11:32 )    Color: x / Appearance: x / SG: x / pH: x  Gluc: 119 mg/dL / Ketone: x  / Bili: x / Urobili: x   Blood: x / Protein: x / Nitrite: x   Leuk Esterase: x / RBC: x / WBC x   Sq Epi: x / Non Sq Epi: x / Bacteria: x        PT/INR - ( 25 Jul 2023 11:32 )   PT: 11.2 sec;   INR: 0.97 ratio         PTT - ( 25 Jul 2023 11:32 )  PTT:45.6 sec    < from: Xray Chest 1 View- PORTABLE-Urgent (Xray Chest 1 View- PORTABLE-Urgent .) (06.27.23 @ 14:39) >    IMPRESSION:    No focal consolidation.    < end of copied text >

## 2023-07-25 NOTE — ED PROVIDER NOTE - ATTENDING APP SHARED VISIT CONTRIBUTION OF CARE
Attending MD Cotton:   I personally have seen and examined this patient.  Physician assistant note reviewed and agree on plan of care and except where noted.  See below for details.     72F with PMH/PSH including COPD on 2L NC at baseline, wheelchair at baseline, Graves', HTN, HLD, breast cancer, renal cancer presents to the ED with bilateral LE pain, swelling, erythema for three days.  [place] was admitted earlier in the month for similar symptoms of LLE.  Reports symptoms had improved during admission and after admission.  Reports noted return of symptoms over the last three days, noting first at posterior L lower leg and then R.  Reports now has progressed proximally.  Reports weakness, denies fevers, chills.  Denies chest pain, new shortness of breath, abdominal pain, nausea, vomiting, diarrhea, urinary complaints.      Exam:   General: NAD  HENT: head NCAT, airway patent  Eyes: anicteric, no conjunctival injection   Lungs: lungs CTAB with good inspiratory effort, no wheezing, no rhonchi, no rales, no increased work of breathing, on 2L NC (baseline)  Cardiac: +S1S2, no obvious m/r/g  GI: abdomen soft with +BS, NT, exam limited secondary to body habitus  : no CVAT  MSK: ranging neck freely, bilateral LE erythema, edema, increased warmth, L>R extending to just distal of the knees  Neuro: moving all extremities spontaneously, nonfocal, +essential tremos  Psych: normal mood and affect     A/P: 72F with bilateral LE erythema, edema and pain, concern for cellulitis, will obtain labs, Cx, will give IV abx, will also obtain bilateral LE US to eval for DVT, reports took Tylenol prior to arrival, reports does not need anything further at this time, will likely need re-admission Attending MD Cotton:   I personally have seen and examined this patient.  Physician assistant note reviewed and agree on plan of care and except where noted.  See below for details.     72F with PMH/PSH including COPD on 2L NC at baseline, wheelchair at baseline, Graves', HTN, HLD, breast cancer, renal cancer presents to the ED with bilateral LE pain, swelling, erythema for three days.  [place] was admitted earlier in the month for similar symptoms of LLE.  Reports symptoms had improved during admission and after admission.  Reports noted return of symptoms over the last three days, noting first at posterior L lower leg and then R.  Reports now has progressed proximally.  Reports weakness, denies fevers, chills.  Denies chest pain, new shortness of breath, abdominal pain, nausea, vomiting, diarrhea, urinary complaints.      Exam:   General: NAD  HENT: head NCAT, airway patent  Eyes: anicteric, no conjunctival injection   Lungs: lungs CTAB with good inspiratory effort, no wheezing, no rhonchi, no rales, no increased work of breathing, on 2L NC (baseline)  Cardiac: +S1S2, no obvious m/r/g  GI: abdomen soft with +BS, NT, exam limited secondary to body habitus  : no CVAT  MSK: ranging neck freely, bilateral LE erythema, edema, increased warmth, L>R extending to just distal of the knees  Neuro: moving all extremities spontaneously, nonfocal, +essential tremor  Psych: normal mood and affect     A/P: 72F with bilateral LE erythema, edema and pain, concern for cellulitis, will obtain labs, Cx, will give IV abx, will also obtain bilateral LE US to eval for DVT, less likely, history and clinical picture not consistent with HF at this time, reports took Tylenol prior to arrival, reports does not need anything further at this time, will likely need re-admission

## 2023-07-25 NOTE — ED CLERICAL - DIVISION
PT resting in bed, warm blanket given, SANTY Cardenas, JAVIER  04/02/19 1936
Pt came to office with gange green on right foot first three toes. Pt states this has been a chronic issue for awhile. Pt states pain worsening. Pt has noted pulse present. No color change noted outside ganggreen. On arrival triaged. Pt resting in bed and blood sent off.       Scooby Stewart RN  04/02/19 0972
[facility]...

## 2023-07-25 NOTE — H&P ADULT - NSHPSOCIALHISTORY_GEN_ALL_CORE
Social History:    Marital Status:  (  x )    (   ) Single    (   )    (  )   Occupation:   Lives with: (  ) alone  (  ) children   ( x ) spouse   (  ) parents  (  ) other    Substance Use (street drugs): (x  ) never used  (  ) other:  Tobacco Usage:  ( x  ) never smoked   (   ) former smoker   (   ) current smoker  (     ) pack years  (        ) last cigarette date  Alcohol Usage: denies     (     ) Advanced Directives: (     ) None    (      ) DNR    (     ) DNI    (     ) Health Care Proxy:

## 2023-07-25 NOTE — H&P ADULT - NSHPREVIEWOFSYSTEMS_GEN_ALL_CORE
REVIEW OF SYSTEMS:    CONSTITUTIONAL: No weakness, no fevers + chills  EYES/ENT: No visual changes;  No vertigo or throat pain   NECK: No pain or stiffness  RESPIRATORY: No cough, wheezing, hemoptysis; No shortness of breath  CARDIOVASCULAR: No chest pain or palpitations  GASTROINTESTINAL: No abdominal or epigastric pain. No nausea, vomiting, or hematemesis; No diarrhea or constipation. No melena or hematochezia.  GENITOURINARY: No dysuria, frequency or hematuria  NEUROLOGICAL: No numbness or weakness  SKIN: Bilateral legs erythema     All other review of systems is negative unless indicated above.

## 2023-07-25 NOTE — CONSULT NOTE ADULT - PROBLEM SELECTOR RECOMMENDATION 9
Agree with Cefazolin therapy unless current nasal MRSA PCR is positive  Would increase dose to 2 gm q8 given volume of distribution  Follow closely for any heart failure symptoms with higher dose of cefazolin  Follow leg appearance and will adjust antimicrobials if no clinical improvement

## 2023-07-26 LAB
ANION GAP SERPL CALC-SCNC: 15 MMOL/L — SIGNIFICANT CHANGE UP (ref 5–17)
BUN SERPL-MCNC: 20 MG/DL — SIGNIFICANT CHANGE UP (ref 7–23)
CALCIUM SERPL-MCNC: 9.5 MG/DL — SIGNIFICANT CHANGE UP (ref 8.4–10.5)
CHLORIDE SERPL-SCNC: 104 MMOL/L — SIGNIFICANT CHANGE UP (ref 96–108)
CO2 SERPL-SCNC: 23 MMOL/L — SIGNIFICANT CHANGE UP (ref 22–31)
CREAT SERPL-MCNC: 0.79 MG/DL — SIGNIFICANT CHANGE UP (ref 0.5–1.3)
EGFR: 78 ML/MIN/1.73M2 — SIGNIFICANT CHANGE UP
GLUCOSE SERPL-MCNC: 108 MG/DL — HIGH (ref 70–99)
HCT VFR BLD CALC: 35.7 % — SIGNIFICANT CHANGE UP (ref 34.5–45)
HGB BLD-MCNC: 11.1 G/DL — LOW (ref 11.5–15.5)
MCHC RBC-ENTMCNC: 30.9 PG — SIGNIFICANT CHANGE UP (ref 27–34)
MCHC RBC-ENTMCNC: 31.1 GM/DL — LOW (ref 32–36)
MCV RBC AUTO: 99.4 FL — SIGNIFICANT CHANGE UP (ref 80–100)
MRSA PCR RESULT.: SIGNIFICANT CHANGE UP
NRBC # BLD: 0 /100 WBCS — SIGNIFICANT CHANGE UP (ref 0–0)
PLATELET # BLD AUTO: 179 K/UL — SIGNIFICANT CHANGE UP (ref 150–400)
POTASSIUM SERPL-MCNC: 4.2 MMOL/L — SIGNIFICANT CHANGE UP (ref 3.5–5.3)
POTASSIUM SERPL-SCNC: 4.2 MMOL/L — SIGNIFICANT CHANGE UP (ref 3.5–5.3)
RBC # BLD: 3.59 M/UL — LOW (ref 3.8–5.2)
RBC # FLD: 13.9 % — SIGNIFICANT CHANGE UP (ref 10.3–14.5)
S AUREUS DNA NOSE QL NAA+PROBE: SIGNIFICANT CHANGE UP
SODIUM SERPL-SCNC: 142 MMOL/L — SIGNIFICANT CHANGE UP (ref 135–145)
TSH SERPL-MCNC: 3.22 UIU/ML — SIGNIFICANT CHANGE UP (ref 0.27–4.2)
TSH SERPL-MCNC: 3.86 UIU/ML — SIGNIFICANT CHANGE UP (ref 0.27–4.2)
WBC # BLD: 10.25 K/UL — SIGNIFICANT CHANGE UP (ref 3.8–10.5)
WBC # FLD AUTO: 10.25 K/UL — SIGNIFICANT CHANGE UP (ref 3.8–10.5)

## 2023-07-26 PROCEDURE — 99231 SBSQ HOSP IP/OBS SF/LOW 25: CPT

## 2023-07-26 RX ORDER — ACETAMINOPHEN 500 MG
1000 TABLET ORAL ONCE
Refills: 0 | Status: COMPLETED | OUTPATIENT
Start: 2023-07-26 | End: 2023-07-26

## 2023-07-26 RX ADMIN — Medication 60 MILLIGRAM(S): at 17:42

## 2023-07-26 RX ADMIN — ATORVASTATIN CALCIUM 40 MILLIGRAM(S): 80 TABLET, FILM COATED ORAL at 22:12

## 2023-07-26 RX ADMIN — GABAPENTIN 300 MILLIGRAM(S): 400 CAPSULE ORAL at 05:54

## 2023-07-26 RX ADMIN — Medication 100 MILLIGRAM(S): at 13:06

## 2023-07-26 RX ADMIN — Medication 60 MILLIGRAM(S): at 05:54

## 2023-07-26 RX ADMIN — Medication 1 GRAM(S): at 12:03

## 2023-07-26 RX ADMIN — Medication 125 MICROGRAM(S): at 05:53

## 2023-07-26 RX ADMIN — Medication 75 MILLIGRAM(S): at 05:53

## 2023-07-26 RX ADMIN — Medication 10 MILLIGRAM(S): at 22:13

## 2023-07-26 RX ADMIN — Medication 1000 MILLIGRAM(S): at 02:34

## 2023-07-26 RX ADMIN — LOSARTAN POTASSIUM 100 MILLIGRAM(S): 100 TABLET, FILM COATED ORAL at 05:54

## 2023-07-26 RX ADMIN — Medication 400 MILLIGRAM(S): at 21:42

## 2023-07-26 RX ADMIN — OLANZAPINE 10 MILLIGRAM(S): 15 TABLET, FILM COATED ORAL at 22:12

## 2023-07-26 RX ADMIN — Medication 1000 MILLIGRAM(S): at 22:50

## 2023-07-26 RX ADMIN — ENOXAPARIN SODIUM 40 MILLIGRAM(S): 100 INJECTION SUBCUTANEOUS at 17:43

## 2023-07-26 RX ADMIN — TIOTROPIUM BROMIDE 2 PUFF(S): 18 CAPSULE ORAL; RESPIRATORY (INHALATION) at 12:04

## 2023-07-26 RX ADMIN — Medication 10 MILLIGRAM(S): at 06:02

## 2023-07-26 RX ADMIN — Medication 1 GRAM(S): at 05:53

## 2023-07-26 RX ADMIN — Medication 10 MILLIGRAM(S): at 17:40

## 2023-07-26 RX ADMIN — Medication 60 MILLIGRAM(S): at 23:44

## 2023-07-26 RX ADMIN — Medication 40 MILLIGRAM(S): at 05:53

## 2023-07-26 RX ADMIN — Medication 75 MILLIGRAM(S): at 17:41

## 2023-07-26 RX ADMIN — Medication 100 MILLIGRAM(S): at 22:12

## 2023-07-26 RX ADMIN — GABAPENTIN 300 MILLIGRAM(S): 400 CAPSULE ORAL at 17:43

## 2023-07-26 RX ADMIN — Medication 1 GRAM(S): at 17:41

## 2023-07-26 RX ADMIN — Medication 1 GRAM(S): at 23:44

## 2023-07-26 RX ADMIN — Medication 60 MILLIGRAM(S): at 12:04

## 2023-07-26 RX ADMIN — DULOXETINE HYDROCHLORIDE 60 MILLIGRAM(S): 30 CAPSULE, DELAYED RELEASE ORAL at 12:04

## 2023-07-26 RX ADMIN — Medication 400 MILLIGRAM(S): at 01:30

## 2023-07-26 RX ADMIN — Medication 100 MILLIGRAM(S): at 05:55

## 2023-07-26 RX ADMIN — MONTELUKAST 10 MILLIGRAM(S): 4 TABLET, CHEWABLE ORAL at 12:04

## 2023-07-26 NOTE — CONSULT NOTE ADULT - SUBJECTIVE AND OBJECTIVE BOX
HPI:  72y old Female admitted for c/o of lower extremity swelling/erythema.  Patient was hospitalized in March with complaints of lower extremity swelling and skin changes; at that time, was seen by ID and felt to have more stasis-related changes than infection; was also seen by Dermatology who recommended measures to address stasis dermatitis. Received brief course of treatment with first-generation cephalosporin and discharged home. Was recently hospitalized (early ) with HF, also had some lower extremity changes that were again not felt to be primarily infectious. Patient now presents reporting 4 days of progressive worsening of erythema and swelling associated with warmth in bilateral lower extremities, L>R. Reports no fever, chills, sweats, rigors. Legs to not feel warm on their own, but notes warmth when she touches them.     PAST MEDICAL & SURGICAL HISTORY:  Hypertension  Hyperlipidemia  Anxiety  Graves disease  Kidney cancer, primary, with metastasis from kidney to other site, left  LEft sided- s/p nephrectomy only- no chemo or RT  Breast cancer in situ, left  COPD (chronic obstructive pulmonary disease)  Hypothyroid  DVT (deep venous thrombosis)  history of- not presently on A/C  Obesity  Sleep apnea  Asthma  S/P cholecystectomy  S/p nephrectomy  left  S/P breast biopsy  left  History of pelvic surgery  Pelvic Sling  History of eye surgery  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  History of carpal tunnel release  right wrist  History of parathyroidectomy  S/P laminectomy  now bed and wheelchair ridden with severe pain      Allergies    Peaches (Hives)  No Known Drug Allergies  shellfish (Hives)  Grapes (Hives)    Intolerances        ANTIMICROBIALS:    ceFAZolin   IVPB 1000 every 8 hours      OTHER MEDS:  acetaminophen     Tablet .. 650 milliGRAM(s) Oral every 6 hours PRN  albuterol    90 MICROgram(s) HFA Inhaler 2 Puff(s) Inhalation every 6 hours PRN  amitriptyline 10 milliGRAM(s) Oral at bedtime  atorvastatin 40 milliGRAM(s) Oral at bedtime  busPIRone 10 milliGRAM(s) Oral two times a day  diltiazem    Tablet 60 milliGRAM(s) Oral every 6 hours  DULoxetine 60 milliGRAM(s) Oral daily  enoxaparin Injectable 40 milliGRAM(s) SubCutaneous every 24 hours  furosemide    Tablet 40 milliGRAM(s) Oral daily  gabapentin 300 milliGRAM(s) Oral two times a day  hydrALAZINE 75 milliGRAM(s) Oral two times a day  levothyroxine 125 MICROGram(s) Oral daily  LORazepam     Tablet 0.5 milliGRAM(s) Oral two times a day PRN  losartan 100 milliGRAM(s) Oral daily  melatonin 3 milliGRAM(s) Oral at bedtime PRN  montelukast 10 milliGRAM(s) Oral daily  OLANZapine 10 milliGRAM(s) Oral daily  oxycodone    5 mG/acetaminophen 325 mG 1 Tablet(s) Oral every 8 hours PRN  propranolol 20 milliGRAM(s) Oral two times a day  sucralfate 1 Gram(s) Oral four times a day  tiotropium 2.5 MICROgram(s) Inhaler 2 Puff(s) Inhalation daily      SOCIAL HISTORY:    FAMILY HISTORY:  Family history of myocardial infarction  mother  at age 67 and father at age 67 of MI's    Family history of hypertension  mother and father    Family history of diabetes mellitus (Sibling)  siblings    Family history of heart disease (Sibling)  brother has a PPM    Family history of thyroid cancer (Child)  daughter has thyroid cancer        Drug Dosing Weight  Height (cm): 152.4 (2023 10:59)  Weight (kg): 90.7 (2023 10:59)  BMI (kg/m2): 39.1 (2023 10:59)  BSA (m2): 1.87 (2023 10:59)    Vital Signs Last 24 Hrs  T(F): 98.2 (23 @ 10:59), Max: 98.2 (23 @ 10:59)  Vital Signs Last 24 Hrs  HR: 73 (23 @ 10:59) (73 - 73)  BP: 120/74 (23 @ 10:59) (120/74 - 120/74)  RR: 20 (23 @ 10:59)  SpO2: 98% (23 @ 10:59) (98% - 98%)  Wt(kg): --    PE:  GENERAL: Awake, alert, interactive. Total body tremor  HEENT: NC/AT. PERRLA. O/P WNL  NECK: Supple, full ROM. No cervical/supraclavicular PETRA  CV: RRR. Distant. No obvious murmur  LUNGS: Basillar crackles, otherwise CTA  ABD: Soft. +BS. NT/ND. No HSM  EXTREM: Significant erythema of bilateral LE from ankles to knees posteriorly with anterior extension. R>L. +Warmth. +Tenderness                            12.7   9.21  )-----------( 150      ( 2023 11:32 )             40.2           140  |  104  |  15  ----------------------------<  119<H>  4.8   |  23  |  0.64    Ca    10.1      2023 11:32    TPro  7.2  /  Alb  3.8  /  TBili  0.8  /  DBili  x   /  AST  22  /  ALT  27  /  AlkPhos  102        MRSA/MSSA PCR (23 @ 14:46)    MRSA PCR Result.: NotDetec: The results of this test should be interpreted with consideration of  clinical context.  Not Detected result indicates the absence of organisms or that the number  of organisms is below the assay limit of detection.  Detected result indicates the presence of organism nucleic acid.  Indeterminate result may indicate the presence of amplification  inhibitors in specimen; presence or absence of organisms cannot be  determined. Consider collecting new specimen if further testing is still  needed.  This qualitative PCR assay is FDA-approved, and its performance was  established by [facility] "[email or web address] [place], [place].   Staph aureus PCR Result: NotDetec          < from: TTE W or WO Ultrasound Enhancing Agent (23 @ 08:42) >  CONCLUSIONS:      1. Normal left ventricular cavity size. The left ventricular wall thickness is normal. The left ventricular systolic function is normal with an ejection fraction of 71 % by Nagel's method of disks. There are no regional wall motion abnormalities seen.   2. There is mild (grade 1) left ventricular diastolic dysfunction.   3. Normal atria.   4. Normal right ventricular cavity size, normal right ventricular wall thickness and normal right ventricular systolic function.   5. No significant valvular disease.   6. Compared to the transthoracic echocardiogram performed on 3/22/2023 there have been no significant interval changes.    ________________________________________________________________________________________  FINDINGS:     Left Ventricle:  Normal left ventricular cavity size. The left ventricular wall thickness is normal. The left ventricular systolic function is normal with a calculated ejection fraction of 71 % by the Nagel's biplane method of disks. There are no regional wall motion abnormalities seen. There is mild (grade 1) left ventricular diastolic dysfunction, with normal filling pressure. There is no evidence of a thrombus in the left ventricle.    < end of copied text >      
CHIEF COMPLAINT: edema    HISTORY OF PRESENT ILLNESS:  72-year-old female with past medical history of COPD on 2 L of nasal cannula at home, wheelchair dependent, Graves' disease, HLD, HTN, breast cancer, kidney cancer here for evaluation of bilateral leg swelling and erythema over the past 3 days.  Patient was hospitalized here earlier in the month for similar symptoms of the left lower extremity.  She has been home she has been doing well however noticed 3 days ago started having erythema of the posterior aspect of the left lower extremity first, then her right lower extremity, both of which have progressed up to her knees.  She also endorses generalized weakness.  She denies any fevers/chills, no involvement up into the groin, no open sores on  her skin, no abdominal pain, chest pain, shortness of breath, difficulty breathing.      Allergies    Peaches (Hives)  No Known Drug Allergies  shellfish (Hives)  Grapes (Hives)    Intolerances    	    MEDICATIONS:  diltiazem    Tablet 60 milliGRAM(s) Oral every 6 hours  enoxaparin Injectable 40 milliGRAM(s) SubCutaneous every 24 hours  furosemide    Tablet 40 milliGRAM(s) Oral daily  hydrALAZINE 75 milliGRAM(s) Oral two times a day  losartan 100 milliGRAM(s) Oral daily  propranolol 20 milliGRAM(s) Oral two times a day    ceFAZolin   IVPB 2000 milliGRAM(s) IV Intermittent every 8 hours    albuterol    90 MICROgram(s) HFA Inhaler 2 Puff(s) Inhalation every 6 hours PRN  montelukast 10 milliGRAM(s) Oral daily  tiotropium 2.5 MICROgram(s) Inhaler 2 Puff(s) Inhalation daily    acetaminophen     Tablet .. 650 milliGRAM(s) Oral every 6 hours PRN  amitriptyline 10 milliGRAM(s) Oral at bedtime  busPIRone 10 milliGRAM(s) Oral two times a day  DULoxetine 60 milliGRAM(s) Oral daily  gabapentin 300 milliGRAM(s) Oral two times a day  LORazepam     Tablet 0.5 milliGRAM(s) Oral two times a day PRN  melatonin 3 milliGRAM(s) Oral at bedtime PRN  OLANZapine 10 milliGRAM(s) Oral daily  oxycodone    5 mG/acetaminophen 325 mG 1 Tablet(s) Oral every 8 hours PRN    sucralfate 1 Gram(s) Oral four times a day    atorvastatin 40 milliGRAM(s) Oral at bedtime  levothyroxine 125 MICROGram(s) Oral daily        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      History of carpal tunnel release  right wrist      History of parathyroidectomy      S/P laminectomy  now bed and wheelchair ridden with severe pain          FAMILY HISTORY:  Family history of myocardial infarction  mother  at age 67 and father at age 67 of MI's    Family history of hypertension  mother and father    Family history of diabetes mellitus (Sibling)  siblings    Family history of heart disease (Sibling)  brother has a PPM    Family history of thyroid cancer (Child)  daughter has thyroid cancer        SOCIAL HISTORY:    non smoker    REVIEW OF SYSTEMS:  See HPI, otherwise complete 10 point review of systems negative    [ ] All others negative	      PHYSICAL EXAM:  T(C): 36.7 (23 @ 04:49), Max: 36.8 (23 @ 10:59)  HR: 80 (23 @ 04:49) (73 - 89)  BP: 116/73 (23 @ 04:49) (116/73 - 143/75)  RR: 18 (23 @ 04:49) (18 - 20)  SpO2: 98% (23 @ 04:49) (94% - 98%)  Wt(kg): --  I&O's Summary    2023 07:01  -  2023 07:00  --------------------------------------------------------  IN: 320 mL / OUT: 0 mL / NET: 320 mL        Appearance: No Acute Distress	  HEENT:  Normal oral mucosa, PERRL, EOMI	  Cardiovascular: Normal S1 S2, No JVD, No murmurs/rubs/gallops  Respiratory: Lungs clear to auscultation bilaterally  Gastrointestinal:  Soft, Non-tender, + BS	  Skin: No rashes, No ecchymoses, No cyanosis	  Neurologic: Non-focal  Extremities: No clubbing, cyanosis + edema  Vascular: Peripheral pulses palpable 2+ bilaterally  Psychiatry: A & O x 3, Mood & affect appropriate    Laboratory Data:	 	    CBC Full  -  ( 2023 06:33 )  WBC Count : 10.25 K/uL  Hemoglobin : 11.1 g/dL  Hematocrit : 35.7 %  Platelet Count - Automated : 179 K/uL  Mean Cell Volume : 99.4 fl  Mean Cell Hemoglobin : 30.9 pg  Mean Cell Hemoglobin Concentration : 31.1 gm/dL  Auto Neutrophil # : x  Auto Lymphocyte # : x  Auto Monocyte # : x  Auto Eosinophil # : x  Auto Basophil # : x  Auto Neutrophil % : x  Auto Lymphocyte % : x  Auto Monocyte % : x  Auto Eosinophil % : x  Auto Basophil % : x        142  |  104  |  20  ----------------------------<  108<H>  4.2   |  23  |  0.79      140  |  104  |  15  ----------------------------<  119<H>  4.8   |  23  |  0.64    Ca    9.5      2023 06:35  Ca    10.1      2023 11:32    TPro  7.2  /  Alb  3.8  /  TBili  0.8  /  DBili  x   /  AST  22  /  ALT  27  /  AlkPhos  102        proBNP:   Lipid Profile:   HgA1c:   TSH: Thyroid Stimulating Hormone, Serum: 3.22 uIU/mL ( @ 11:32)        CARDIAC MARKERS:            Interpretation of Telemetry: 	    ECG:  	  RADIOLOGY:  OTHER: 	    PREVIOUS DIAGNOSTIC TESTING:    [ ] Echocardiogram:  [ ] Catheterization:  [ ] Stress Test:  	    Assessment:  72-year-old female history of COPD on 2 L nasal cannula at home, Graves' disease, HLD, HTN, breast cancer, kidney cancer, skin cancer all in remission presenting with lower extremity edema and probable cellulitis      Recs:  cardiac stable  no e/o chf   recent TTE mild diastolic dysfunction, normal lv/rv and valves  cw lasix 40mg po qd  rec compression socks, ace wraps and leg elevation  recent le giovanni duplex neg for dvt  f/u ID recs      Advanced care planning forms were discussed. Code status including forceful chest compressions, defibrillation and intubation were discussed. The risks benefits and alternatives to pertinent cardiac procedures and interventions were discussed in detail and all questions were answered. Duration: 15 minutes.  Greater than 90 minutes spent on total encounter; more than 50% of the visit was spent counseling and/or coordinating care by the attending physician.   	  Juan Salcedo MD   Cardiovascular Diseases  [phone]

## 2023-07-26 NOTE — ED BEHAVIORAL HEALTH ASSESSMENT NOTE - HOMICIDALITY / AGGRESSION (CURRENT/PAST)
After reviewing forms,patient's parent needs to fill some portions and sign.  I called [facility] and informed her of this. She said she'd complete her portion and send it back.  
None known in lifetime

## 2023-07-26 NOTE — PROGRESS NOTE ADULT - NSPROGADDITIONALINFOA_GEN_ALL_CORE
Michael I. Oppenheim, MD  Division of Infectious Diseases  Reachable on Teams  Pager: [phone]  O: [phone] if nights/weekends/no response    I will be away 7/27. ID service available if needed

## 2023-07-26 NOTE — PROGRESS NOTE ADULT - ASSESSMENT
72 f with     Bilateral cellulitis legs vs Dermatitis   - blood cultures  - antibiotic  - ID evaluation noted    Acute on Chronic Systolic Congestive Heart Failure  - diurese  - cardiology follow    Back pain  - pain control    Dysphagia/ Esophageal spasms  - CCB for esophageal spasms  - esophageal manometry as OTP    Anxiety / Depression   - control  - Ativan 0.5 mg bid prn    Breast cancer in situ, left   - stable. Follow as OTP with Oncology    COPD (chronic obstructive pulmonary disease)   - nebs    Graves disease/  Hypothyroid   - continue supplementation  - follow TSH    Hyperlipidemia   - stable    Hypertension   - control    Kidney cancer, primary, with metastasis from kidney to other site, left LEft sided- s/p nephrectomy only- no chemo or RT  - stable    Obesity   - Nutrition education    DVT prophylaxis  - AC     d/w patient,     Nathan Mora MD phone [phone]

## 2023-07-26 NOTE — PATIENT PROFILE ADULT - FALL HARM RISK - HARM RISK INTERVENTIONS

## 2023-07-26 NOTE — PROVIDER CONTACT NOTE (MEDICATION) - ACTION/TREATMENT ORDERED:
IV Tylenol ordered for the patient PA notified. IV Tylenol 1 gram ordered and given to patient. will continue to monitor

## 2023-07-26 NOTE — PROVIDER CONTACT NOTE (MEDICATION) - ASSESSMENT
Patient states constant aching pain on the left leg and back, no chest pain or sob, patient alert Patient alert and oriented x 4. Patient states constant aching pain on the left leg and back (8 out of 10 pain), no chest pain or sob, vss

## 2023-07-26 NOTE — PROGRESS NOTE ADULT - ASSESSMENT
72 year old with known lymphedema now with skin changes very consistent with bilateral cellulitis, negative MRSA PCR. Improving on Cefazolin

## 2023-07-26 NOTE — PATIENT PROFILE ADULT - LEGAL HELP
Both cardiology and EP are agreeable to patient taking Rozerem.    Please advise on dose and directions and quantity.   no

## 2023-07-27 LAB
ANION GAP SERPL CALC-SCNC: 12 MMOL/L — SIGNIFICANT CHANGE UP (ref 5–17)
BUN SERPL-MCNC: 24 MG/DL — HIGH (ref 7–23)
CALCIUM SERPL-MCNC: 9.4 MG/DL — SIGNIFICANT CHANGE UP (ref 8.4–10.5)
CHLORIDE SERPL-SCNC: 101 MMOL/L — SIGNIFICANT CHANGE UP (ref 96–108)
CO2 SERPL-SCNC: 24 MMOL/L — SIGNIFICANT CHANGE UP (ref 22–31)
CREAT SERPL-MCNC: 0.88 MG/DL — SIGNIFICANT CHANGE UP (ref 0.5–1.3)
EGFR: 69 ML/MIN/1.73M2 — SIGNIFICANT CHANGE UP
GLUCOSE SERPL-MCNC: 139 MG/DL — HIGH (ref 70–99)
HCT VFR BLD CALC: 36.1 % — SIGNIFICANT CHANGE UP (ref 34.5–45)
HGB BLD-MCNC: 11.1 G/DL — LOW (ref 11.5–15.5)
MCHC RBC-ENTMCNC: 30.7 GM/DL — LOW (ref 32–36)
MCHC RBC-ENTMCNC: 31.4 PG — SIGNIFICANT CHANGE UP (ref 27–34)
MCV RBC AUTO: 102.3 FL — HIGH (ref 80–100)
NRBC # BLD: 0 /100 WBCS — SIGNIFICANT CHANGE UP (ref 0–0)
PLATELET # BLD AUTO: 161 K/UL — SIGNIFICANT CHANGE UP (ref 150–400)
POTASSIUM SERPL-MCNC: 3.9 MMOL/L — SIGNIFICANT CHANGE UP (ref 3.5–5.3)
POTASSIUM SERPL-SCNC: 3.9 MMOL/L — SIGNIFICANT CHANGE UP (ref 3.5–5.3)
RBC # BLD: 3.53 M/UL — LOW (ref 3.8–5.2)
RBC # FLD: 14.3 % — SIGNIFICANT CHANGE UP (ref 10.3–14.5)
SODIUM SERPL-SCNC: 137 MMOL/L — SIGNIFICANT CHANGE UP (ref 135–145)
WBC # BLD: 7.54 K/UL — SIGNIFICANT CHANGE UP (ref 3.8–10.5)
WBC # FLD AUTO: 7.54 K/UL — SIGNIFICANT CHANGE UP (ref 3.8–10.5)

## 2023-07-27 RX ADMIN — LOSARTAN POTASSIUM 100 MILLIGRAM(S): 100 TABLET, FILM COATED ORAL at 05:25

## 2023-07-27 RX ADMIN — ENOXAPARIN SODIUM 40 MILLIGRAM(S): 100 INJECTION SUBCUTANEOUS at 18:39

## 2023-07-27 RX ADMIN — MONTELUKAST 10 MILLIGRAM(S): 4 TABLET, CHEWABLE ORAL at 11:37

## 2023-07-27 RX ADMIN — Medication 75 MILLIGRAM(S): at 05:25

## 2023-07-27 RX ADMIN — ATORVASTATIN CALCIUM 40 MILLIGRAM(S): 80 TABLET, FILM COATED ORAL at 21:06

## 2023-07-27 RX ADMIN — Medication 1 GRAM(S): at 11:37

## 2023-07-27 RX ADMIN — Medication 1 APPLICATION(S): at 18:39

## 2023-07-27 RX ADMIN — GABAPENTIN 300 MILLIGRAM(S): 400 CAPSULE ORAL at 18:38

## 2023-07-27 RX ADMIN — Medication 125 MICROGRAM(S): at 05:25

## 2023-07-27 RX ADMIN — Medication 60 MILLIGRAM(S): at 11:37

## 2023-07-27 RX ADMIN — Medication 40 MILLIGRAM(S): at 05:26

## 2023-07-27 RX ADMIN — Medication 100 MILLIGRAM(S): at 21:05

## 2023-07-27 RX ADMIN — Medication 10 MILLIGRAM(S): at 21:06

## 2023-07-27 RX ADMIN — OLANZAPINE 10 MILLIGRAM(S): 15 TABLET, FILM COATED ORAL at 21:06

## 2023-07-27 RX ADMIN — Medication 60 MILLIGRAM(S): at 05:25

## 2023-07-27 RX ADMIN — Medication 60 MILLIGRAM(S): at 23:56

## 2023-07-27 RX ADMIN — Medication 1 GRAM(S): at 05:26

## 2023-07-27 RX ADMIN — DULOXETINE HYDROCHLORIDE 60 MILLIGRAM(S): 30 CAPSULE, DELAYED RELEASE ORAL at 11:34

## 2023-07-27 RX ADMIN — Medication 1 GRAM(S): at 23:56

## 2023-07-27 RX ADMIN — Medication 60 MILLIGRAM(S): at 18:39

## 2023-07-27 RX ADMIN — Medication 10 MILLIGRAM(S): at 05:28

## 2023-07-27 RX ADMIN — TIOTROPIUM BROMIDE 2 PUFF(S): 18 CAPSULE ORAL; RESPIRATORY (INHALATION) at 11:38

## 2023-07-27 RX ADMIN — Medication 75 MILLIGRAM(S): at 18:37

## 2023-07-27 RX ADMIN — Medication 100 MILLIGRAM(S): at 05:26

## 2023-07-27 RX ADMIN — Medication 1 APPLICATION(S): at 05:27

## 2023-07-27 RX ADMIN — Medication 100 MILLIGRAM(S): at 13:33

## 2023-07-27 RX ADMIN — Medication 1 GRAM(S): at 18:38

## 2023-07-27 RX ADMIN — Medication 10 MILLIGRAM(S): at 18:38

## 2023-07-27 RX ADMIN — GABAPENTIN 300 MILLIGRAM(S): 400 CAPSULE ORAL at 05:25

## 2023-07-27 NOTE — PROGRESS NOTE ADULT - ASSESSMENT
72 f with     Bilateral cellulitis legs vs Dermatitis   - blood cultures  - antibiotic  - ID follow    Acute on Chronic Systolic Congestive Heart Failure  - diurese  - cardiology follow    Back pain  - pain control    Dysphagia/ Esophageal spasms  - CCB for esophageal spasms  - esophageal manometry as OTP    Anxiety / Depression   - control  - Ativan 0.5 mg bid prn    Breast cancer in situ, left   - stable. Follow as OTP with Oncology    COPD (chronic obstructive pulmonary disease)   - nebs    Graves disease/  Hypothyroid   - continue supplementation  - follow TSH    Hyperlipidemia   - stable    Hypertension   - control    Kidney cancer, primary, with metastasis from kidney to other site, left LEft sided- s/p nephrectomy only- no chemo or RT  - stable    Obesity   - Nutrition education    DVT prophylaxis  - AC     d/w patient, , son and ACP,     Nathan Mora MD phone [phone]

## 2023-07-27 NOTE — PROVIDER CONTACT NOTE (MEDICATION) - SITUATION
Patient given percocet at 9:36 pm, and not due for another 8 hours, provider notified for pt complaint of 8 out of 10  constant burning pain on the left leg and back
Pt c/o of 8/10 pain in the upper back, not due for prn oxy 5mg until 11 and prn tylenol not enough to cover the pain scale

## 2023-07-27 NOTE — PROVIDER CONTACT NOTE (MEDICATION) - BACKGROUND
attempt PA please. PSK  
pt adx with bilateral cellulitis, pmh of copd and on 2L NC
Patient adx for cellulitis of both lower extremities and pmh of pelvic surgery and parathyroidectomy

## 2023-07-28 ENCOUNTER — TRANSCRIPTION ENCOUNTER (OUTPATIENT)
Age: 73
End: 2023-07-28

## 2023-07-28 LAB
ANION GAP SERPL CALC-SCNC: 12 MMOL/L — SIGNIFICANT CHANGE UP (ref 5–17)
BUN SERPL-MCNC: 21 MG/DL — SIGNIFICANT CHANGE UP (ref 7–23)
CALCIUM SERPL-MCNC: 9.6 MG/DL — SIGNIFICANT CHANGE UP (ref 8.4–10.5)
CHLORIDE SERPL-SCNC: 103 MMOL/L — SIGNIFICANT CHANGE UP (ref 96–108)
CO2 SERPL-SCNC: 24 MMOL/L — SIGNIFICANT CHANGE UP (ref 22–31)
CREAT SERPL-MCNC: 0.78 MG/DL — SIGNIFICANT CHANGE UP (ref 0.5–1.3)
EGFR: 80 ML/MIN/1.73M2 — SIGNIFICANT CHANGE UP
GLUCOSE SERPL-MCNC: 126 MG/DL — HIGH (ref 70–99)
HCT VFR BLD CALC: 36.8 % — SIGNIFICANT CHANGE UP (ref 34.5–45)
HGB BLD-MCNC: 11.6 G/DL — SIGNIFICANT CHANGE UP (ref 11.5–15.5)
MCHC RBC-ENTMCNC: 31.4 PG — SIGNIFICANT CHANGE UP (ref 27–34)
MCHC RBC-ENTMCNC: 31.5 GM/DL — LOW (ref 32–36)
MCV RBC AUTO: 99.7 FL — SIGNIFICANT CHANGE UP (ref 80–100)
NRBC # BLD: 0 /100 WBCS — SIGNIFICANT CHANGE UP (ref 0–0)
PLATELET # BLD AUTO: 172 K/UL — SIGNIFICANT CHANGE UP (ref 150–400)
POTASSIUM SERPL-MCNC: 4.1 MMOL/L — SIGNIFICANT CHANGE UP (ref 3.5–5.3)
POTASSIUM SERPL-SCNC: 4.1 MMOL/L — SIGNIFICANT CHANGE UP (ref 3.5–5.3)
RBC # BLD: 3.69 M/UL — LOW (ref 3.8–5.2)
RBC # FLD: 14 % — SIGNIFICANT CHANGE UP (ref 10.3–14.5)
SODIUM SERPL-SCNC: 139 MMOL/L — SIGNIFICANT CHANGE UP (ref 135–145)
WBC # BLD: 7.7 K/UL — SIGNIFICANT CHANGE UP (ref 3.8–10.5)
WBC # FLD AUTO: 7.7 K/UL — SIGNIFICANT CHANGE UP (ref 3.8–10.5)

## 2023-07-28 PROCEDURE — 99231 SBSQ HOSP IP/OBS SF/LOW 25: CPT

## 2023-07-28 RX ADMIN — Medication 10 MILLIGRAM(S): at 05:21

## 2023-07-28 RX ADMIN — Medication 100 MILLIGRAM(S): at 05:18

## 2023-07-28 RX ADMIN — Medication 75 MILLIGRAM(S): at 05:20

## 2023-07-28 RX ADMIN — DULOXETINE HYDROCHLORIDE 60 MILLIGRAM(S): 30 CAPSULE, DELAYED RELEASE ORAL at 11:35

## 2023-07-28 RX ADMIN — Medication 60 MILLIGRAM(S): at 17:31

## 2023-07-28 RX ADMIN — Medication 1 APPLICATION(S): at 05:19

## 2023-07-28 RX ADMIN — Medication 100 MILLIGRAM(S): at 21:25

## 2023-07-28 RX ADMIN — TIOTROPIUM BROMIDE 2 PUFF(S): 18 CAPSULE ORAL; RESPIRATORY (INHALATION) at 11:36

## 2023-07-28 RX ADMIN — Medication 60 MILLIGRAM(S): at 11:36

## 2023-07-28 RX ADMIN — Medication 40 MILLIGRAM(S): at 05:20

## 2023-07-28 RX ADMIN — LOSARTAN POTASSIUM 100 MILLIGRAM(S): 100 TABLET, FILM COATED ORAL at 05:20

## 2023-07-28 RX ADMIN — Medication 1 GRAM(S): at 11:36

## 2023-07-28 RX ADMIN — Medication 60 MILLIGRAM(S): at 23:05

## 2023-07-28 RX ADMIN — Medication 75 MILLIGRAM(S): at 17:31

## 2023-07-28 RX ADMIN — Medication 1 GRAM(S): at 17:31

## 2023-07-28 RX ADMIN — ENOXAPARIN SODIUM 40 MILLIGRAM(S): 100 INJECTION SUBCUTANEOUS at 18:36

## 2023-07-28 RX ADMIN — Medication 1 GRAM(S): at 23:05

## 2023-07-28 RX ADMIN — OLANZAPINE 10 MILLIGRAM(S): 15 TABLET, FILM COATED ORAL at 21:26

## 2023-07-28 RX ADMIN — Medication 10 MILLIGRAM(S): at 17:30

## 2023-07-28 RX ADMIN — ATORVASTATIN CALCIUM 40 MILLIGRAM(S): 80 TABLET, FILM COATED ORAL at 21:26

## 2023-07-28 RX ADMIN — Medication 60 MILLIGRAM(S): at 05:23

## 2023-07-28 RX ADMIN — GABAPENTIN 300 MILLIGRAM(S): 400 CAPSULE ORAL at 17:30

## 2023-07-28 RX ADMIN — Medication 1 GRAM(S): at 05:20

## 2023-07-28 RX ADMIN — GABAPENTIN 300 MILLIGRAM(S): 400 CAPSULE ORAL at 05:21

## 2023-07-28 RX ADMIN — Medication 125 MICROGRAM(S): at 05:20

## 2023-07-28 RX ADMIN — MONTELUKAST 10 MILLIGRAM(S): 4 TABLET, CHEWABLE ORAL at 11:36

## 2023-07-28 RX ADMIN — Medication 1 APPLICATION(S): at 17:32

## 2023-07-28 RX ADMIN — Medication 10 MILLIGRAM(S): at 21:27

## 2023-07-28 RX ADMIN — Medication 100 MILLIGRAM(S): at 14:34

## 2023-07-28 NOTE — PHYSICAL THERAPY INITIAL EVALUATION ADULT - ADDITIONAL COMMENTS
Pt resides in an apartment w/  & son, no steps to negotiate. Pt states she requires assistance w/ all mobility & ADL's. States she is able to ambulate short distances w/ RW & assistance, but mainly utilizes WC for mobility. Has [facility]HA 8am - 5:30pm, 7 days/week.

## 2023-07-28 NOTE — PHYSICAL THERAPY INITIAL EVALUATION ADULT - ACTIVE RANGE OF MOTION EXAMINATION, REHAB EVAL
Bilateral LE 0-110 degrees flexion AROM/bilateral upper extremity Active ROM was WFL (within functional limits)

## 2023-07-28 NOTE — PHYSICAL THERAPY INITIAL EVALUATION ADULT - BALANCE TRAINING, PT EVAL
Pt will improve static and dynamic standing balance during functional activities by at least 1 balance grade within 2 weeks.

## 2023-07-28 NOTE — PROGRESS NOTE ADULT - ASSESSMENT
72 f with     Bilateral cellulitis legs vs Dermatitis   - clotrimazole  - antibiotic  - ID follow    Acute on Chronic Systolic Congestive Heart Failure  - diurese  - cardiology follow    Back pain  - pain control    Dysphagia/ Esophageal spasms  - CCB for esophageal spasms  - esophageal manometry as OTP    Anxiety / Depression   - control  - Ativan 0.5 mg bid prn    Breast cancer in situ, left   - stable. Follow as OTP with Oncology    COPD (chronic obstructive pulmonary disease)   - nebs    Graves disease/  Hypothyroid   - continue supplementation  - follow TSH    Hyperlipidemia   - stable    Hypertension   - control    Kidney cancer, primary, with metastasis from kidney to other site, left LEft sided- s/p nephrectomy only- no chemo or RT  - stable    Obesity   - Nutrition education    DVT prophylaxis  - AC     DCP home.     d/w patient    Nathan Mora MD phone [phone]

## 2023-07-28 NOTE — PHYSICAL THERAPY INITIAL EVALUATION ADULT - NSPTDISCHREC_GEN_A_CORE
JUDITH, if pt declines [facility], recommend Home PT, pt requires assistance for transfers/ADLS, ambulance for transportation home./Sub-acute Rehab

## 2023-07-28 NOTE — PROGRESS NOTE ADULT - NSPROGADDITIONALINFOA_GEN_ALL_CORE
Michael I. Oppenheim, MD  Division of Infectious Diseases  Reachable on Teams  Pager: [phone]  O: [phone] if nights/weekends/no response    ID service covering over weekend - [phone]

## 2023-07-28 NOTE — DIETITIAN INITIAL EVALUATION ADULT - REASON INDICATOR FOR ASSESSMENT
consulted for pressure injury stage 2 or >. information obtained from pt, electronic medical record, RN

## 2023-07-28 NOTE — PHYSICAL THERAPY INITIAL EVALUATION ADULT - LEVEL OF INDEPENDENCE: SIT/STAND, REHAB EVAL
maximum assist (25% patients effort) Pt able to pull self up using lilian steady./moderate assist (50% patients effort)

## 2023-07-28 NOTE — DIETITIAN INITIAL EVALUATION ADULT - PERSON TAUGHT/METHOD
Portion control nutrition education provided. importance of fruit, vegetable and lean protein intake. carbohydrate portion control. patient agreed./verbal instruction/patient instructed

## 2023-07-28 NOTE — DIETITIAN INITIAL EVALUATION ADULT - ORAL INTAKE PTA/DIET HISTORY
visited pt at bedside this morning. reports good PO intake. aware of the need to eat healthier.  cooks and likes pasta.  visited pt at bedside this morning. reports good PO intake. aware of the need to eat healthier.  cooks and likes pasta. confirms food allergies, peach, grapes, shellfish

## 2023-07-28 NOTE — DIETITIAN INITIAL EVALUATION ADULT - PERTINENT MEDS FT
MEDICATIONS  (STANDING):  amitriptyline 10 milliGRAM(s) Oral at bedtime  atorvastatin 40 milliGRAM(s) Oral at bedtime  busPIRone 10 milliGRAM(s) Oral two times a day  ceFAZolin   IVPB 2000 milliGRAM(s) IV Intermittent every 8 hours  clotrimazole 1% Cream 1 Application(s) Topical two times a day  diltiazem    Tablet 60 milliGRAM(s) Oral every 6 hours  DULoxetine 60 milliGRAM(s) Oral daily  enoxaparin Injectable 40 milliGRAM(s) SubCutaneous every 24 hours  furosemide    Tablet 40 milliGRAM(s) Oral daily  gabapentin 300 milliGRAM(s) Oral two times a day  hydrALAZINE 75 milliGRAM(s) Oral two times a day  levothyroxine 125 MICROGram(s) Oral daily  losartan 100 milliGRAM(s) Oral daily  montelukast 10 milliGRAM(s) Oral daily  OLANZapine 10 milliGRAM(s) Oral daily  propranolol 20 milliGRAM(s) Oral two times a day  sucralfate 1 Gram(s) Oral four times a day  tiotropium 2.5 MICROgram(s) Inhaler 2 Puff(s) Inhalation daily    MEDICATIONS  (PRN):  acetaminophen     Tablet .. 650 milliGRAM(s) Oral every 6 hours PRN Temp greater or equal to 38.5C (101.3F), Mild Pain (1 - 3)  albuterol    90 MICROgram(s) HFA Inhaler 2 Puff(s) Inhalation every 6 hours PRN Shortness of Breath and/or Wheezing  LORazepam     Tablet 0.5 milliGRAM(s) Oral two times a day PRN Anxiety  melatonin 3 milliGRAM(s) Oral at bedtime PRN Insomnia  oxycodone    5 mG/acetaminophen 325 mG 1 Tablet(s) Oral every 8 hours PRN Moderate Pain (4 - 6)

## 2023-07-28 NOTE — PROGRESS NOTE ADULT - PROBLEM SELECTOR PLAN 1
Continue Cefazolin at high dose (2 gm q8)  Continue to monitor appearance  Consider clotrimazole cream for feet     When ready for discharge, will need high-dose cephalexin (1 gm q6) given volume of distribution
Continue Cefazolin at high dose (2 gm q8)  Continue to monitor appearance  Clotrimazole cream for feet     When ready for discharge, will need high-dose cephalexin (1 gm q6) given volume of distribution. Patient may open capsules to mix with liquid or food for doses

## 2023-07-28 NOTE — DIETITIAN INITIAL EVALUATION ADULT - ADD RECOMMEND
1) Will continue to monitor PO intake, weight, labs, skin, GI status and diet 2) RD to add No Sugar Added Mighty Shake supplement daily to aid in meeting nutrient needs for wound healing

## 2023-07-28 NOTE — DIETITIAN INITIAL EVALUATION ADULT - PERTINENT LABORATORY DATA
07-28    139  |  103  |  21  ----------------------------<  126<H>  4.1   |  24  |  0.78    Ca    9.6      28 Jul 2023 07:22

## 2023-07-28 NOTE — PHYSICAL THERAPY INITIAL EVALUATION ADULT - PERTINENT HX OF CURRENT PROBLEM, REHAB EVAL
Pt is a 72 year old female with PMH significant for COPD on 2L of O2, graves dz, HTN, breast cancer, kidney cancer, known lymphedema.  Pt is presenting with bilateral LE swelling and erythema.  Patient was hospitalized at [facility]  in the month for similar symptoms of the left lower extremity.  She has been home she has been doing well however noticed 3 days ago started having erythema of the posterior aspect of the left lower extremity first, then her right lower extremity, both of which have progressed up to the knees.  Pt also endorses generalized weakness.  Pt found to have cellulitis vs dermatitis of Bilateral LE, as well as acute on chronic CHF.    TTE 6/25:   1. Normal left ventricular cavity size. The left ventricular wall thickness is normal. The left ventricular systolic function is normal with an ejection fraction of 71 % by Nagel's method of disks. There are no regional wall motion abnormalities seen.   2. There is mild (grade 1) left ventricular diastolic dysfunction.   3. Normal atria.   4. Normal right ventricular cavity size, normal right ventricular wall thickness and normal right ventricular systolic function.   5. No significant valvular disease.   6. Compared to the transthoracic echocardiogram performed on 3/22/2023 there have been no significant interval changes. Pt is a 72 year old female with PMH significant for COPD on 2L of O2, graves dz, HTN, breast cancer, kidney cancer, known lymphedema.  Pt is presenting with bilateral LE swelling and erythema.  Patient was hospitalized at [facility]  in the month for similar symptoms of the left lower extremity.  She has been home she has been doing well however noticed 3 days ago started having erythema of the posterior aspect of the left lower extremity first, then her right lower extremity, both of which have progressed up to the knees.  Pt also endorses generalized weakness.  Pt found to have cellulitis vs dermatitis of Bilateral LE, and acute on chronic systolic CHF.  Duplex (7/25): No finding of DVT  CXR (7/25): No focal consolidation or pleural effusion  TTE (6/25): Normal left ventricular cavity size. The left ventricular wall thickness is normal. The left ventricular systolic function is normal with a calculated ejection fraction of 71 % by the Nagel's biplane method of disks. There are no regional wall motion abnormalities seen. There is mild (grade 1) left ventricular diastolic dysfunction, with normal filling pressure. There is no evidence of a thrombus in the left ventricle. Pt is a 72 year old female with PMH significant for COPD on 2L of O2, graves dz, HTN, breast cancer, kidney cancer, skin cancer all in remission, and known lymphedema.  Pt is presenting with bilateral LE swelling and erythema.  Patient was hospitalized at [facility]  in the month for similar symptoms of the left lower extremity.  She has been home she has been doing well however noticed 3 days ago started having erythema of the posterior aspect of the left lower extremity first, then her right lower extremity, both of which have progressed up to the knees.  Pt also endorses generalized weakness.  Pt found to have cellulitis vs dermatitis of Bilateral LE.  Cardiology following, no evidence of CHF.  Duplex (7/25): No finding of DVT  CXR (7/25): No focal consolidation or pleural effusion  TTE (6/25): Normal left ventricular cavity size. The left ventricular wall thickness is normal. The left ventricular systolic function is normal with a calculated ejection fraction of 71 % by the Nagel's biplane method of disks. There are no regional wall motion abnormalities seen. There is mild (grade 1) left ventricular diastolic dysfunction, with normal filling pressure. There is no evidence of a thrombus in the left ventricle.

## 2023-07-28 NOTE — PHYSICAL THERAPY INITIAL EVALUATION ADULT - NSPTDMEREC_GEN_A_CORE
Pt owns DME, rolling walker, wheelchair Pt owns DME, rolling walker, wheelchair.  Pt would benefit from a lift device.

## 2023-07-29 PROCEDURE — 93010 ELECTROCARDIOGRAM REPORT: CPT

## 2023-07-29 RX ADMIN — Medication 10 MILLIGRAM(S): at 21:19

## 2023-07-29 RX ADMIN — Medication 75 MILLIGRAM(S): at 05:00

## 2023-07-29 RX ADMIN — MONTELUKAST 10 MILLIGRAM(S): 4 TABLET, CHEWABLE ORAL at 11:54

## 2023-07-29 RX ADMIN — GABAPENTIN 300 MILLIGRAM(S): 400 CAPSULE ORAL at 05:00

## 2023-07-29 RX ADMIN — Medication 1 GRAM(S): at 17:22

## 2023-07-29 RX ADMIN — Medication 1 GRAM(S): at 11:53

## 2023-07-29 RX ADMIN — OLANZAPINE 10 MILLIGRAM(S): 15 TABLET, FILM COATED ORAL at 21:19

## 2023-07-29 RX ADMIN — Medication 10 MILLIGRAM(S): at 05:00

## 2023-07-29 RX ADMIN — Medication 100 MILLIGRAM(S): at 05:32

## 2023-07-29 RX ADMIN — GABAPENTIN 300 MILLIGRAM(S): 400 CAPSULE ORAL at 17:23

## 2023-07-29 RX ADMIN — TIOTROPIUM BROMIDE 2 PUFF(S): 18 CAPSULE ORAL; RESPIRATORY (INHALATION) at 11:54

## 2023-07-29 RX ADMIN — Medication 100 MILLIGRAM(S): at 21:16

## 2023-07-29 RX ADMIN — Medication 125 MICROGRAM(S): at 05:01

## 2023-07-29 RX ADMIN — Medication 75 MILLIGRAM(S): at 17:23

## 2023-07-29 RX ADMIN — Medication 10 MILLIGRAM(S): at 17:22

## 2023-07-29 RX ADMIN — Medication 1 APPLICATION(S): at 05:46

## 2023-07-29 RX ADMIN — ENOXAPARIN SODIUM 40 MILLIGRAM(S): 100 INJECTION SUBCUTANEOUS at 18:08

## 2023-07-29 RX ADMIN — LOSARTAN POTASSIUM 100 MILLIGRAM(S): 100 TABLET, FILM COATED ORAL at 05:00

## 2023-07-29 RX ADMIN — Medication 40 MILLIGRAM(S): at 05:01

## 2023-07-29 RX ADMIN — ATORVASTATIN CALCIUM 40 MILLIGRAM(S): 80 TABLET, FILM COATED ORAL at 21:19

## 2023-07-29 RX ADMIN — Medication 100 MILLIGRAM(S): at 13:28

## 2023-07-29 RX ADMIN — Medication 60 MILLIGRAM(S): at 11:53

## 2023-07-29 RX ADMIN — Medication 60 MILLIGRAM(S): at 17:22

## 2023-07-29 RX ADMIN — DULOXETINE HYDROCHLORIDE 60 MILLIGRAM(S): 30 CAPSULE, DELAYED RELEASE ORAL at 11:53

## 2023-07-29 RX ADMIN — Medication 1 GRAM(S): at 05:00

## 2023-07-29 RX ADMIN — Medication 60 MILLIGRAM(S): at 05:00

## 2023-07-29 NOTE — DISCHARGE NOTE PROVIDER - HOSPITAL COURSE
72-year-old female with past medical history of COPD on 2 L of nasal cannula at home, wheelchair dependent, Graves' disease, HLD, HTN, breast cancer, kidney cancer here for evaluation of bilateral leg swelling and erythema over the past 3 days.  Patient was hospitalized here earlier in the month for similar symptoms of the left lower extremity.  She has been home she has been doing well however noticed 3 days ago started having erythema of the posterior aspect of the left lower extremity first, then her right lower extremity, both of which have progressed up to her knees.  She also endorses generalized weakness.  She denies any fevers/chills, no involvement up into the groin, no open sores on  her skin, no abdominal pain, chest pain, shortness of breath, difficulty breathing.  Admitted  Dx:   Bilateral  lower  leg  cellulitis .  Pt  was  initiated  on  Ancef  IV  at  this  time.       72-year-old female with past medical history of COPD on 2 L of nasal cannula at home, wheelchair dependent, Graves' disease, HLD, HTN, breast cancer, kidney cancer here for evaluation of bilateral leg swelling and erythema over the past 3 days.  Patient was hospitalized here earlier in the month for similar symptoms of the left lower extremity.  She has been home she has been doing well however noticed 3 days ago started having erythema of the posterior aspect of the left lower extremity first, then her right lower extremity, both of which have progressed up to her knees.  She also endorses generalized weakness.  She denies any fevers/chills, no involvement up into the groin, no open sores on  her skin, no abdominal pain, chest pain, shortness of breath, difficulty breathing.    Admitted  Dx:   Bilateral  lower  leg  cellulitis.  Pt  was  initiated  on  Ancef  IV  at  this  time.  Pt      72-year-old female with past medical history of COPD on 2 L of nasal cannula at home, wheelchair dependent, Graves' disease, HLD, HTN, breast cancer, kidney cancer here for evaluation of bilateral leg swelling and erythema over the past 3 days.  Patient was hospitalized here earlier in the month for similar symptoms of the left lower extremity.  She has been home she has been doing well however noticed 3 days ago started having erythema of the posterior aspect of the left lower extremity first, then her right lower extremity, both of which have progressed up to her knees.  She also endorses generalized weakness.  She denies any fevers/chills, no involvement up into the groin, no open sores on  her skin, no abdominal pain, chest pain, shortness of breath, difficulty breathing.    Admitted  Dx:   Bilateral  lower  leg  cellulitis.  Pt  was  initiated  on  Ancef  IV  at  this  time.  Pt medically stable for discharge home now.

## 2023-07-29 NOTE — DISCHARGE NOTE PROVIDER - NSDCMRMEDTOKEN_GEN_ALL_CORE_FT
amLODIPine 5 mg oral tablet: 1 tab(s) orally once a day  aspirin 81 mg oral delayed release tablet: 1 tab(s) orally once a day  atorvastatin 40 mg oral tablet: 1 tab(s) orally once a day  busPIRone 10 mg oral tablet: 2 tab(s) orally once a day  furosemide 40 mg oral tablet: 1 tab(s) orally once a day  gabapentin 300 mg oral capsule: 1 capsule at noon and 1 capsule in the evening  hydrALAZINE 25 mg oral tablet: 3 tab(s) orally 2 times a day  levothyroxine 125 mcg (0.125 mg) oral tablet: 1 tab(s) orally once a day  losartan 100 mg oral tablet: 1 tab(s) orally once a day  montelukast 10 mg oral tablet: 1 tab(s) orally once a day  Multiple Vitamins oral tablet: 1 tab(s) orally once a day  OLANZapine 10 mg oral tablet: 1 tab(s) orally once a day  propranolol 20 mg oral tablet: 1 tab(s) orally 2 times a day  Spiriva HandiHaler 18 mcg inhalation capsule: 1 cap(s) inhaled once a day   acetaminophen 325 mg oral tablet: 2 tab(s) orally every 6 hours as needed for  Mild Pain (1 - 3)  amitriptyline 10 mg oral tablet: 1 tab(s) orally once a day (at bedtime)  amLODIPine 5 mg oral tablet: 1 tab(s) orally once a day  aspirin 81 mg oral delayed release tablet: 1 tab(s) orally once a day  atorvastatin 40 mg oral tablet: 1 tab(s) orally once a day (at bedtime)  busPIRone 10 mg oral tablet: 2 tab(s) orally once a day  cephalexin 500 mg oral tablet: 2 tab(s) orally every 6 hours  DULoxetine 60 mg oral delayed release capsule: 1 cap(s) orally once a day  furosemide 40 mg oral tablet: 1 tab(s) orally once a day  gabapentin 300 mg oral capsule: 1 capsule at noon and 1 capsule in the evening  hydrALAZINE 25 mg oral tablet: 3 tab(s) orally 2 times a day  levothyroxine 125 mcg (0.125 mg) oral tablet: 1 tab(s) orally once a day  losartan 100 mg oral tablet: 1 tab(s) orally once a day  melatonin 3 mg oral tablet: 1 tab(s) orally once a day (at bedtime) As needed Insomnia  montelukast 10 mg oral tablet: 1 tab(s) orally once a day  Multiple Vitamins oral tablet: 1 tab(s) orally once a day  OLANZapine 10 mg oral tablet: 1 tab(s) orally once a day  propranolol 20 mg oral tablet: 1 tab(s) orally 2 times a day  Spiriva HandiHaler 18 mcg inhalation capsule: 1 cap(s) inhaled once a day

## 2023-07-29 NOTE — DISCHARGE NOTE PROVIDER - NSDCFUADDAPPT_GEN_ALL_CORE_FT
APPTS ARE READY TO BE MADE: [x ] YES    Best Family or Patient Contact (if needed): Patient.     APPTS ARE READY TO BE MADE: [x ] YES    Best Family or Patient Contact (if needed): Patient.    Patient is scheduled with Dr. Robins 8/10/23 12:30pm [address]

## 2023-07-29 NOTE — PROGRESS NOTE ADULT - ASSESSMENT
72 f with     Bilateral cellulitis legs vs Dermatitis   - clotrimazole  - antibiotic  - ID follow    Acute on Chronic Systolic Congestive Heart Failure  - diurese  - cardiology follow    Back pain  - pain control    Dysphagia/ Esophageal spasms  - CCB for esophageal spasms  - esophageal manometry as OTP    Anxiety / Depression   - control  - Ativan 0.5 mg bid prn    Breast cancer in situ, left   - stable. Follow as OTP with Oncology    COPD (chronic obstructive pulmonary disease)   - nebs    Graves disease/  Hypothyroid   - continue supplementation  - follow TSH    Hyperlipidemia   - stable    Hypertension   - control    Kidney cancer, primary, with metastasis from kidney to other site, left LEft sided- s/p nephrectomy only- no chemo or RT  - stable    Obesity   - Nutrition education    DVT prophylaxis  - AC     d/w patient    Nathan Mora MD phone [phone]

## 2023-07-29 NOTE — DISCHARGE NOTE PROVIDER - CARE PROVIDER_API CALL
Juan Salcedo  Cardiovascular Disease  [address] [place] [address]  Phone: [phone]  Fax: [phone]  Established Patient  Follow Up Time: 1 week

## 2023-07-29 NOTE — DISCHARGE NOTE PROVIDER - NSDCCPCAREPLAN_GEN_ALL_CORE_FT
PRINCIPAL DISCHARGE DIAGNOSIS  Diagnosis: Cellulitis  Assessment and Plan of Treatment: Take all of your antibiotics as ordered.  Call your Health Care Provider within two days of arriving home to make a follow up appointment within one week.  If the affected cellulitic area increases in redness, warmth, pain or swelling call your Health Care Provider.  If you develop fever, chills, and/or malaise, call your Health Care Provider.       PRINCIPAL DISCHARGE DIAGNOSIS  Diagnosis: Cellulitis  Assessment and Plan of Treatment: Take all of your antibiotics as ordered.  Call your Health Care Provider within two days of arriving home to make a follow up appointment within one week.  If the affected cellulitic area increases in redness, warmth, pain or swelling call your Health Care Provider.  If you develop fever, chills, and/or malaise, call your Health Care Provider.  Follow up with your PMD in 1 week after completing antibiotics.      SECONDARY DISCHARGE DIAGNOSES  Diagnosis: COPD with asthma  Assessment and Plan of Treatment: Call your Health Care provider upon arrival home to make a follow up appointment within one week.  Take all inhalers as prescribed by your Health Care Provider.  Take steroids as prescribed by your Health Care Provider.  If your cough increases infrequency and severity and/or you have shortness of breath or increased shortness of breath call your Health Care Provider.  If you develop fever, chills, night sweats, malaise, and/or change in mental status call your Health care Provider.  Nutrition is very important.  Eat small frequent meals.  Increase your activity as tolerated.  Do not stay in bed all day      Diagnosis: Congestive heart failure (CHF)  Assessment and Plan of Treatment: Weigh yourself daily.  If you gain 3lbs in 3 days, or 5lbs in a week call your Health Care Provider.  Do not eat or drink foods containing more than 2000mg of salt (sodium) in your diet every day.  Call your Health Care Provider if you have any swelling or increased swelling in your feet, ankles, and/or stomach.  Take all of your medication as directed.  If you become dizzy call your Health Care Provider.      Diagnosis: History of esophageal spasm  Assessment and Plan of Treatment: Take your medication (Diltiazem) as prescribed.   Follow up with your medical doctor for routine blood  work monitoring, and to establish long term treatment goals.

## 2023-07-29 NOTE — DISCHARGE NOTE PROVIDER - NSDCFUSCHEDAPPT_GEN_ALL_CORE_FT
Dane Salguero  [facility]  GASTRO [id number] [facility]  Scheduled Appointment: 08/14/2023

## 2023-07-30 RX ADMIN — OLANZAPINE 10 MILLIGRAM(S): 15 TABLET, FILM COATED ORAL at 21:14

## 2023-07-30 RX ADMIN — Medication 1 GRAM(S): at 17:12

## 2023-07-30 RX ADMIN — Medication 75 MILLIGRAM(S): at 05:42

## 2023-07-30 RX ADMIN — Medication 100 MILLIGRAM(S): at 13:15

## 2023-07-30 RX ADMIN — Medication 60 MILLIGRAM(S): at 17:13

## 2023-07-30 RX ADMIN — ATORVASTATIN CALCIUM 40 MILLIGRAM(S): 80 TABLET, FILM COATED ORAL at 21:14

## 2023-07-30 RX ADMIN — Medication 60 MILLIGRAM(S): at 00:32

## 2023-07-30 RX ADMIN — GABAPENTIN 300 MILLIGRAM(S): 400 CAPSULE ORAL at 17:12

## 2023-07-30 RX ADMIN — Medication 60 MILLIGRAM(S): at 11:24

## 2023-07-30 RX ADMIN — Medication 1 GRAM(S): at 23:17

## 2023-07-30 RX ADMIN — GABAPENTIN 300 MILLIGRAM(S): 400 CAPSULE ORAL at 05:34

## 2023-07-30 RX ADMIN — MONTELUKAST 10 MILLIGRAM(S): 4 TABLET, CHEWABLE ORAL at 11:24

## 2023-07-30 RX ADMIN — Medication 10 MILLIGRAM(S): at 05:34

## 2023-07-30 RX ADMIN — DULOXETINE HYDROCHLORIDE 60 MILLIGRAM(S): 30 CAPSULE, DELAYED RELEASE ORAL at 11:24

## 2023-07-30 RX ADMIN — Medication 10 MILLIGRAM(S): at 21:14

## 2023-07-30 RX ADMIN — Medication 1 GRAM(S): at 00:32

## 2023-07-30 RX ADMIN — LOSARTAN POTASSIUM 100 MILLIGRAM(S): 100 TABLET, FILM COATED ORAL at 05:34

## 2023-07-30 RX ADMIN — Medication 40 MILLIGRAM(S): at 05:35

## 2023-07-30 RX ADMIN — TIOTROPIUM BROMIDE 2 PUFF(S): 18 CAPSULE ORAL; RESPIRATORY (INHALATION) at 11:24

## 2023-07-30 RX ADMIN — Medication 75 MILLIGRAM(S): at 17:13

## 2023-07-30 RX ADMIN — Medication 1 GRAM(S): at 11:24

## 2023-07-30 RX ADMIN — Medication 60 MILLIGRAM(S): at 05:34

## 2023-07-30 RX ADMIN — ENOXAPARIN SODIUM 40 MILLIGRAM(S): 100 INJECTION SUBCUTANEOUS at 18:21

## 2023-07-30 RX ADMIN — Medication 10 MILLIGRAM(S): at 17:12

## 2023-07-30 RX ADMIN — Medication 125 MICROGRAM(S): at 05:35

## 2023-07-30 RX ADMIN — Medication 100 MILLIGRAM(S): at 05:34

## 2023-07-30 RX ADMIN — Medication 1 APPLICATION(S): at 17:13

## 2023-07-30 RX ADMIN — Medication 60 MILLIGRAM(S): at 23:17

## 2023-07-30 RX ADMIN — Medication 100 MILLIGRAM(S): at 21:14

## 2023-07-30 RX ADMIN — Medication 1 GRAM(S): at 05:34

## 2023-07-30 NOTE — PROGRESS NOTE ADULT - ASSESSMENT
72 f with     Bilateral cellulitis legs vs Dermatitis   - clotrimazole  - antibiotic  - ID follow    Acute on Chronic Systolic Congestive Heart Failure  - diurese  - cardiology follow    Back pain  - pain control    Dysphagia/ Esophageal spasms  - CCB for esophageal spasms  - esophageal manometry as OTP    Anxiety / Depression   - control  - Ativan 0.5 mg bid prn    Breast cancer in situ, left   - stable. Follow as OTP with Oncology    COPD (chronic obstructive pulmonary disease)   - nebs    Graves disease/  Hypothyroid   - continue supplementation  - follow TSH    Hyperlipidemia   - stable    Hypertension   - control    Kidney cancer, primary, with metastasis from kidney to other site, left LEft sided- s/p nephrectomy only- no chemo or RT  - stable    Obesity   - Nutrition education    DVT prophylaxis  - AC     d/w patient,  and son    Nathan Mora MD phone [phone]

## 2023-07-31 PROCEDURE — 93010 ELECTROCARDIOGRAM REPORT: CPT

## 2023-07-31 PROCEDURE — 99232 SBSQ HOSP IP/OBS MODERATE 35: CPT

## 2023-07-31 RX ORDER — IPRATROPIUM/ALBUTEROL SULFATE 18-103MCG
3 AEROSOL WITH ADAPTER (GRAM) INHALATION EVERY 6 HOURS
Refills: 0 | Status: DISCONTINUED | OUTPATIENT
Start: 2023-07-31 | End: 2023-08-01

## 2023-07-31 RX ADMIN — Medication 3 MILLILITER(S): at 18:46

## 2023-07-31 RX ADMIN — GABAPENTIN 300 MILLIGRAM(S): 400 CAPSULE ORAL at 17:09

## 2023-07-31 RX ADMIN — Medication 1 APPLICATION(S): at 06:06

## 2023-07-31 RX ADMIN — Medication 75 MILLIGRAM(S): at 17:09

## 2023-07-31 RX ADMIN — Medication 1 GRAM(S): at 11:31

## 2023-07-31 RX ADMIN — Medication 1 APPLICATION(S): at 17:09

## 2023-07-31 RX ADMIN — LOSARTAN POTASSIUM 100 MILLIGRAM(S): 100 TABLET, FILM COATED ORAL at 06:05

## 2023-07-31 RX ADMIN — OLANZAPINE 10 MILLIGRAM(S): 15 TABLET, FILM COATED ORAL at 21:17

## 2023-07-31 RX ADMIN — MONTELUKAST 10 MILLIGRAM(S): 4 TABLET, CHEWABLE ORAL at 11:30

## 2023-07-31 RX ADMIN — Medication 10 MILLIGRAM(S): at 06:04

## 2023-07-31 RX ADMIN — Medication 40 MILLIGRAM(S): at 06:05

## 2023-07-31 RX ADMIN — Medication 1 GRAM(S): at 17:09

## 2023-07-31 RX ADMIN — Medication 60 MILLIGRAM(S): at 06:04

## 2023-07-31 RX ADMIN — DULOXETINE HYDROCHLORIDE 60 MILLIGRAM(S): 30 CAPSULE, DELAYED RELEASE ORAL at 11:32

## 2023-07-31 RX ADMIN — ENOXAPARIN SODIUM 40 MILLIGRAM(S): 100 INJECTION SUBCUTANEOUS at 17:08

## 2023-07-31 RX ADMIN — Medication 100 MILLIGRAM(S): at 06:06

## 2023-07-31 RX ADMIN — Medication 100 MILLIGRAM(S): at 21:17

## 2023-07-31 RX ADMIN — Medication 1 GRAM(S): at 23:33

## 2023-07-31 RX ADMIN — Medication 1 GRAM(S): at 06:04

## 2023-07-31 RX ADMIN — Medication 60 MILLIGRAM(S): at 17:08

## 2023-07-31 RX ADMIN — TIOTROPIUM BROMIDE 2 PUFF(S): 18 CAPSULE ORAL; RESPIRATORY (INHALATION) at 11:30

## 2023-07-31 RX ADMIN — Medication 10 MILLIGRAM(S): at 17:09

## 2023-07-31 RX ADMIN — ATORVASTATIN CALCIUM 40 MILLIGRAM(S): 80 TABLET, FILM COATED ORAL at 21:17

## 2023-07-31 RX ADMIN — Medication 125 MICROGRAM(S): at 06:05

## 2023-07-31 RX ADMIN — GABAPENTIN 300 MILLIGRAM(S): 400 CAPSULE ORAL at 06:06

## 2023-07-31 RX ADMIN — Medication 60 MILLIGRAM(S): at 23:33

## 2023-07-31 RX ADMIN — Medication 10 MILLIGRAM(S): at 21:17

## 2023-07-31 RX ADMIN — Medication 100 MILLIGRAM(S): at 12:48

## 2023-07-31 RX ADMIN — Medication 60 MILLIGRAM(S): at 11:31

## 2023-07-31 RX ADMIN — Medication 75 MILLIGRAM(S): at 06:05

## 2023-07-31 NOTE — PROGRESS NOTE ADULT - ASSESSMENT
72 f with     Bilateral cellulitis legs vs Dermatitis   - clotrimazole  - antibiotic  - ID follow    Acute on Chronic Systolic Congestive Heart Failure  - diurese  - cardiology follow    Back pain  - pain control    Dysphagia/ Esophageal spasms  - CCB for esophageal spasms  - esophageal manometry as OTP    Anxiety / Depression   - control  - Ativan 0.5 mg bid prn    Breast cancer in situ, left   - stable. Follow as OTP with Oncology    COPD (chronic obstructive pulmonary disease)   - nebs    Graves disease/  Hypothyroid   - continue supplementation  - follow TSH    Hyperlipidemia   - stable    Hypertension   - control    Kidney cancer, primary, with metastasis from kidney to other site, left LEft sided- s/p nephrectomy only- no chemo or RT  - stable    Obesity   - Nutrition education    DVT prophylaxis  - AC     DCP home.     d/w patient     Nathan Mora MD phone [phone]

## 2023-07-31 NOTE — PROVIDER CONTACT NOTE (OTHER) - ASSESSMENT
pt alert and oriented x3. pt c/o of chest tightness and pressure. pt denies chest pain. 2L o2 NC in place. NP made aware

## 2023-07-31 NOTE — PROGRESS NOTE ADULT - ASSESSMENT
72 year old with known lymphedema now with skin changes very consistent with bilateral cellulitis, negative MRSA PCR. Improving on Cefazolin  - hope for discharge soon  - leg elevation

## 2023-08-01 ENCOUNTER — TRANSCRIPTION ENCOUNTER (OUTPATIENT)
Age: 73
End: 2023-08-01

## 2023-08-01 VITALS
TEMPERATURE: 98 F | SYSTOLIC BLOOD PRESSURE: 142 MMHG | DIASTOLIC BLOOD PRESSURE: 72 MMHG | OXYGEN SATURATION: 95 % | HEART RATE: 71 BPM | RESPIRATION RATE: 18 BRPM

## 2023-08-01 PROCEDURE — 96374 THER/PROPH/DIAG INJ IV PUSH: CPT

## 2023-08-01 PROCEDURE — 85027 COMPLETE CBC AUTOMATED: CPT

## 2023-08-01 PROCEDURE — 85610 PROTHROMBIN TIME: CPT

## 2023-08-01 PROCEDURE — 93005 ELECTROCARDIOGRAM TRACING: CPT

## 2023-08-01 PROCEDURE — 83880 ASSAY OF NATRIURETIC PEPTIDE: CPT

## 2023-08-01 PROCEDURE — 80048 BASIC METABOLIC PNL TOTAL CA: CPT

## 2023-08-01 PROCEDURE — 84443 ASSAY THYROID STIM HORMONE: CPT

## 2023-08-01 PROCEDURE — 85025 COMPLETE CBC W/AUTO DIFF WBC: CPT

## 2023-08-01 PROCEDURE — 71045 X-RAY EXAM CHEST 1 VIEW: CPT

## 2023-08-01 PROCEDURE — 87641 MR-STAPH DNA AMP PROBE: CPT

## 2023-08-01 PROCEDURE — 36415 COLL VENOUS BLD VENIPUNCTURE: CPT

## 2023-08-01 PROCEDURE — 87640 STAPH A DNA AMP PROBE: CPT

## 2023-08-01 PROCEDURE — 84484 ASSAY OF TROPONIN QUANT: CPT

## 2023-08-01 PROCEDURE — 97163 PT EVAL HIGH COMPLEX 45 MIN: CPT

## 2023-08-01 PROCEDURE — 94640 AIRWAY INHALATION TREATMENT: CPT

## 2023-08-01 PROCEDURE — 99285 EMERGENCY DEPT VISIT HI MDM: CPT | Mod: 25

## 2023-08-01 PROCEDURE — 85730 THROMBOPLASTIN TIME PARTIAL: CPT

## 2023-08-01 PROCEDURE — 93970 EXTREMITY STUDY: CPT

## 2023-08-01 PROCEDURE — 80053 COMPREHEN METABOLIC PANEL: CPT

## 2023-08-01 PROCEDURE — 99232 SBSQ HOSP IP/OBS MODERATE 35: CPT

## 2023-08-01 PROCEDURE — 87040 BLOOD CULTURE FOR BACTERIA: CPT

## 2023-08-01 RX ORDER — LOSARTAN POTASSIUM 100 MG/1
1 TABLET, FILM COATED ORAL
Qty: 0 | Refills: 0 | DISCHARGE

## 2023-08-01 RX ORDER — OLANZAPINE 15 MG/1
1 TABLET, FILM COATED ORAL
Qty: 0 | Refills: 0 | DISCHARGE
Start: 2023-08-01

## 2023-08-01 RX ORDER — DULOXETINE HYDROCHLORIDE 30 MG/1
1 CAPSULE, DELAYED RELEASE ORAL
Qty: 30 | Refills: 0
Start: 2023-08-01 | End: 2023-08-30

## 2023-08-01 RX ORDER — LOSARTAN POTASSIUM 100 MG/1
1 TABLET, FILM COATED ORAL
Qty: 0 | Refills: 0 | DISCHARGE
Start: 2023-08-01

## 2023-08-01 RX ORDER — OLANZAPINE 15 MG/1
1 TABLET, FILM COATED ORAL
Qty: 0 | Refills: 0 | DISCHARGE

## 2023-08-01 RX ORDER — AMITRIPTYLINE HCL 25 MG
1 TABLET ORAL
Qty: 30 | Refills: 0
Start: 2023-08-01 | End: 2023-08-30

## 2023-08-01 RX ORDER — ATORVASTATIN CALCIUM 80 MG/1
1 TABLET, FILM COATED ORAL
Qty: 0 | Refills: 0 | DISCHARGE
Start: 2023-08-01

## 2023-08-01 RX ORDER — LANOLIN ALCOHOL/MO/W.PET/CERES
1 CREAM (GRAM) TOPICAL
Qty: 0 | Refills: 0 | DISCHARGE
Start: 2023-08-01

## 2023-08-01 RX ORDER — ACETAMINOPHEN 500 MG
2 TABLET ORAL
Qty: 0 | Refills: 0 | DISCHARGE
Start: 2023-08-01

## 2023-08-01 RX ORDER — ATORVASTATIN CALCIUM 80 MG/1
1 TABLET, FILM COATED ORAL
Qty: 0 | Refills: 0 | DISCHARGE

## 2023-08-01 RX ORDER — CEPHALEXIN 500 MG
2 CAPSULE ORAL
Qty: 56 | Refills: 0
Start: 2023-08-01 | End: 2023-08-07

## 2023-08-01 RX ADMIN — Medication 1 GRAM(S): at 05:32

## 2023-08-01 RX ADMIN — Medication 125 MICROGRAM(S): at 05:31

## 2023-08-01 RX ADMIN — GABAPENTIN 300 MILLIGRAM(S): 400 CAPSULE ORAL at 05:31

## 2023-08-01 RX ADMIN — Medication 10 MILLIGRAM(S): at 05:31

## 2023-08-01 RX ADMIN — Medication 3 MILLILITER(S): at 12:59

## 2023-08-01 RX ADMIN — LOSARTAN POTASSIUM 100 MILLIGRAM(S): 100 TABLET, FILM COATED ORAL at 05:31

## 2023-08-01 RX ADMIN — Medication 75 MILLIGRAM(S): at 05:31

## 2023-08-01 RX ADMIN — Medication 60 MILLIGRAM(S): at 13:06

## 2023-08-01 RX ADMIN — Medication 1 APPLICATION(S): at 05:32

## 2023-08-01 RX ADMIN — Medication 40 MILLIGRAM(S): at 05:32

## 2023-08-01 RX ADMIN — Medication 60 MILLIGRAM(S): at 05:31

## 2023-08-01 RX ADMIN — Medication 100 MILLIGRAM(S): at 05:32

## 2023-08-01 RX ADMIN — MONTELUKAST 10 MILLIGRAM(S): 4 TABLET, CHEWABLE ORAL at 13:01

## 2023-08-01 RX ADMIN — TIOTROPIUM BROMIDE 2 PUFF(S): 18 CAPSULE ORAL; RESPIRATORY (INHALATION) at 13:04

## 2023-08-01 RX ADMIN — Medication 100 MILLIGRAM(S): at 13:35

## 2023-08-01 RX ADMIN — Medication 1 GRAM(S): at 12:59

## 2023-08-01 RX ADMIN — DULOXETINE HYDROCHLORIDE 60 MILLIGRAM(S): 30 CAPSULE, DELAYED RELEASE ORAL at 13:02

## 2023-08-01 NOTE — DISCHARGE NOTE NURSING/CASE MANAGEMENT/SOCIAL WORK - NSDCVIVACCINE_GEN_ALL_CORE_FT
Tdap; 19-Nov-2019 16:31; Christine Skaggs (RN); [employer]; [id number] (Exp. Date: 17-Sep-2021); IntraMuscular; Deltoid Left.; 0.5 milliLiter(s); VIS (VIS Published: 09-May-2013, VIS Presented: 19-Nov-2019);

## 2023-08-01 NOTE — PROGRESS NOTE ADULT - PROVIDER SPECIALTY LIST ADULT
Cardiology
Cardiology
Internal Medicine
Cardiology
Cardiology
Internal Medicine
Infectious Disease

## 2023-08-01 NOTE — PROGRESS NOTE ADULT - ASSESSMENT
72 year old with known lymphedema now with skin changes very consistent with bilateral cellulitis, negative MRSA PCR. Improving on Cefazolin  - okay from ID standpoint for d/c home on high-dose cephalexin (1 gm q6) given volume of distribution. Patient may open capsules to mix with liquid or food for doses.  - leg elevation    I have discussed plan of care as detailed above with     Please call Infectious Diseases if there is a change in status.  Thank you.  [phone]. 72 year old with known lymphedema now with skin changes very consistent with bilateral cellulitis, negative MRSA PCR. Improving on Cefazolin  - okay from ID standpoint for d/c home on high-dose cephalexin (1 gm q6) given volume of distribution. Patient may open capsules to mix with liquid or food for doses.  - leg elevation    I have discussed plan of care as detailed above with [id number]    Please call Infectious Diseases if there is a change in status.  Thank you.  [phone].

## 2023-08-01 NOTE — PROGRESS NOTE ADULT - ASSESSMENT
72 f with     Bilateral cellulitis legs vs Dermatitis   - clotrimazole  - antibiotic as per ID  - ID follow    Acute on Chronic Systolic Congestive Heart Failure  - diurese  - cardiology follow    Back pain  - pain control    Dysphagia/ Esophageal spasms  - CCB for esophageal spasms  - esophageal manometry as OTP    Anxiety / Depression   - control  - Ativan 0.5 mg bid prn    Breast cancer in situ, left   - stable. Follow as OTP with Oncology    COPD (chronic obstructive pulmonary disease)   - nebs    Graves disease/  Hypothyroid   - continue supplementation  - follow TSH    Hyperlipidemia   - stable    Hypertension   - control    Kidney cancer, primary, with metastasis from kidney to other site, left LEft sided- s/p nephrectomy only- no chemo or RT  - stable    Obesity   - Nutrition education    DVT prophylaxis  - AC     DC home. Follow with PMD/ Cardiology in 3-4 days.      d/w patient and  QA     Nathan Mora MD phone [phone]

## 2023-08-01 NOTE — PROGRESS NOTE ADULT - PROBLEM SELECTOR PROBLEM 1
Cellulitis of both lower extremities

## 2023-08-01 NOTE — PROGRESS NOTE ADULT - SUBJECTIVE AND OBJECTIVE BOX
Cardiovascular Disease Progress Note    Overnight events: No acute events overnight.  edema improving. no new cardiac sx  Otherwise review of systems negative    Objective Findings:  T(C): 36.6 (07-28-23 @ 04:52), Max: 37.1 (07-27-23 @ 19:52)  HR: 58 (07-28-23 @ 04:52) (58 - 78)  BP: 127/75 (07-28-23 @ 04:52) (111/56 - 139/78)  RR: 18 (07-28-23 @ 04:52) (18 - 18)  SpO2: 97% (07-28-23 @ 04:52) (93% - 98%)  Wt(kg): --  Daily     Daily       Physical Exam:  Gen: NAD  HEENT: EOMI  CV: RRR, normal S1 + S2, no m/r/g  Lungs: CTAB  Abd: soft, non-tender  Ext: No edema    Telemetry:    Laboratory Data:                        11.1   7.54  )-----------( 161      ( 27 Jul 2023 07:01 )             36.1     07-27    137  |  101  |  24<H>  ----------------------------<  139<H>  3.9   |  24  |  0.88    Ca    9.4      27 Jul 2023 07:01                Inpatient Medications:  MEDICATIONS  (STANDING):  amitriptyline 10 milliGRAM(s) Oral at bedtime  atorvastatin 40 milliGRAM(s) Oral at bedtime  busPIRone 10 milliGRAM(s) Oral two times a day  ceFAZolin   IVPB 2000 milliGRAM(s) IV Intermittent every 8 hours  clotrimazole 1% Cream 1 Application(s) Topical two times a day  diltiazem    Tablet 60 milliGRAM(s) Oral every 6 hours  DULoxetine 60 milliGRAM(s) Oral daily  enoxaparin Injectable 40 milliGRAM(s) SubCutaneous every 24 hours  furosemide    Tablet 40 milliGRAM(s) Oral daily  gabapentin 300 milliGRAM(s) Oral two times a day  hydrALAZINE 75 milliGRAM(s) Oral two times a day  levothyroxine 125 MICROGram(s) Oral daily  losartan 100 milliGRAM(s) Oral daily  montelukast 10 milliGRAM(s) Oral daily  OLANZapine 10 milliGRAM(s) Oral daily  propranolol 20 milliGRAM(s) Oral two times a day  sucralfate 1 Gram(s) Oral four times a day  tiotropium 2.5 MICROgram(s) Inhaler 2 Puff(s) Inhalation daily      Assessment:  72-year-old female history of COPD on 2 L nasal cannula at home, Graves' disease, HLD, HTN, breast cancer, kidney cancer, skin cancer all in remission presenting with lower extremity edema and probable cellulitis      Recs:  cardiac stable  no e/o chf   recent TTE mild diastolic dysfunction, normal lv/rv and valves  cw lasix 40mg po qd  rec compression socks, resume ace wraps and leg elevation  recent le giovanni duplex neg for dvt  f/u ID recs. abx per id  dcp       Over 25 minutes spent on total encounter; more than 50% of the visit was spent counseling and/or coordinating care by the attending physician.      Juan Salcedo MD   Cardiovascular Disease  [phone]
Cardiovascular Disease Progress Note    Overnight events: No acute events overnight.  no new cardiac sx  Otherwise review of systems negative    Objective Findings:  T(C): 36.7 (07-31-23 @ 06:00), Max: 36.8 (07-30-23 @ 19:28)  HR: 68 (07-31-23 @ 06:00) (60 - 77)  BP: 146/74 (07-31-23 @ 06:00) (127/64 - 154/71)  RR: 20 (07-31-23 @ 06:00) (18 - 20)  SpO2: 96% (07-31-23 @ 06:00) (95% - 99%)  Wt(kg): --  Daily     Daily       Physical Exam:  Gen: NAD  HEENT: EOMI  CV: RRR, normal S1 + S2, no m/r/g  Lungs: CTAB  Abd: soft, non-tender  Ext: No edema    Telemetry:    Laboratory Data:                    Inpatient Medications:  MEDICATIONS  (STANDING):  amitriptyline 10 milliGRAM(s) Oral at bedtime  atorvastatin 40 milliGRAM(s) Oral at bedtime  busPIRone 10 milliGRAM(s) Oral two times a day  ceFAZolin   IVPB 2000 milliGRAM(s) IV Intermittent every 8 hours  clotrimazole 1% Cream 1 Application(s) Topical two times a day  diltiazem    Tablet 60 milliGRAM(s) Oral every 6 hours  DULoxetine 60 milliGRAM(s) Oral daily  enoxaparin Injectable 40 milliGRAM(s) SubCutaneous every 24 hours  furosemide    Tablet 40 milliGRAM(s) Oral daily  gabapentin 300 milliGRAM(s) Oral two times a day  hydrALAZINE 75 milliGRAM(s) Oral two times a day  levothyroxine 125 MICROGram(s) Oral daily  losartan 100 milliGRAM(s) Oral daily  montelukast 10 milliGRAM(s) Oral daily  OLANZapine 10 milliGRAM(s) Oral daily  propranolol 20 milliGRAM(s) Oral two times a day  sucralfate 1 Gram(s) Oral four times a day  tiotropium 2.5 MICROgram(s) Inhaler 2 Puff(s) Inhalation daily      Assessment:  72-year-old female history of COPD on 2 L nasal cannula at home, Graves' disease, HLD, HTN, breast cancer, kidney cancer, skin cancer all in remission presenting with lower extremity edema and probable cellulitis      Recs:  cardiac stable  no e/o chf   recent TTE mild diastolic dysfunction, normal lv/rv and valves  cw lasix 40mg po qd  rec compression socks, resume ace wraps and leg elevation  recent le giovanni duplex neg for dvt  f/u ID recs. abx per id. likely to need prolonged course        Over 25 minutes spent on total encounter; more than 50% of the visit was spent counseling and/or coordinating care by the attending physician.      Juan Salcedo MD   Cardiovascular Disease  [phone]
Patient is a 72y old  Female who presents with a chief complaint of Cellulitis (01 Aug 2023 11:08)      SUBJECTIVE / OVERNIGHT EVENTS:  Review of Systems  chest pain no  palpitations no  sob no  nausea no  headache no    MEDICATIONS  (STANDING):  albuterol/ipratropium for Nebulization 3 milliLiter(s) Nebulizer every 6 hours  amitriptyline 10 milliGRAM(s) Oral at bedtime  atorvastatin 40 milliGRAM(s) Oral at bedtime  busPIRone 10 milliGRAM(s) Oral two times a day  ceFAZolin   IVPB 2000 milliGRAM(s) IV Intermittent every 8 hours  clotrimazole 1% Cream 1 Application(s) Topical two times a day  diltiazem    Tablet 60 milliGRAM(s) Oral every 6 hours  DULoxetine 60 milliGRAM(s) Oral daily  enoxaparin Injectable 40 milliGRAM(s) SubCutaneous every 24 hours  furosemide    Tablet 40 milliGRAM(s) Oral daily  gabapentin 300 milliGRAM(s) Oral two times a day  hydrALAZINE 75 milliGRAM(s) Oral two times a day  levothyroxine 125 MICROGram(s) Oral daily  losartan 100 milliGRAM(s) Oral daily  montelukast 10 milliGRAM(s) Oral daily  OLANZapine 10 milliGRAM(s) Oral daily  propranolol 20 milliGRAM(s) Oral two times a day  sucralfate 1 Gram(s) Oral four times a day  tiotropium 2.5 MICROgram(s) Inhaler 2 Puff(s) Inhalation daily    MEDICATIONS  (PRN):  acetaminophen     Tablet .. 650 milliGRAM(s) Oral every 6 hours PRN Temp greater or equal to 38.5C (101.3F), Mild Pain (1 - 3)  albuterol    90 MICROgram(s) HFA Inhaler 2 Puff(s) Inhalation every 6 hours PRN Shortness of Breath and/or Wheezing  LORazepam     Tablet 0.5 milliGRAM(s) Oral two times a day PRN Anxiety  melatonin 3 milliGRAM(s) Oral at bedtime PRN Insomnia  oxycodone    5 mG/acetaminophen 325 mG 1 Tablet(s) Oral every 8 hours PRN Moderate Pain (4 - 6)      Vital Signs Last 24 Hrs  T(C): 36.7 (01 Aug 2023 10:11), Max: 37 (01 Aug 2023 01:43)  T(F): 98.1 (01 Aug 2023 10:11), Max: 98.6 (01 Aug 2023 01:43)  HR: 73 (01 Aug 2023 10:11) (65 - 73)  BP: 149/76 (01 Aug 2023 10:11) (126/60 - 155/72)  BP(mean): --  RR: 19 (01 Aug 2023 10:11) (18 - 20)  SpO2: 96% (01 Aug 2023 10:11) (94% - 97%)    Parameters below as of 01 Aug 2023 10:11  Patient On (Oxygen Delivery Method): nasal cannula  O2 Flow (L/min): 2      PHYSICAL EXAM:  GENERAL: NAD, well-developed  HEAD:  Atraumatic, Normocephalic  EYES: EOMI, PERRLA, conjunctiva and sclera clear  NECK: Supple, No JVD  CHEST/LUNG: Clear to auscultation bilaterally; No wheeze  HEART: Regular rate and rhythm; No murmurs, rubs, or gallops  ABDOMEN: Soft, Nontender, Nondistended; Bowel sounds present  EXTREMITIES:  2+bipedal edema improving erythema   PSYCH: AAOx3  NEUROLOGY: non-focal  SKIN: No rashes or lesions    LABS:                      RADIOLOGY & ADDITIONAL TESTS:    Imaging Personally Reviewed:    Consultant(s) Notes Reviewed:      Care Discussed with Consultants/Other Providers:  
Patient is a 72y old  Female who presents with a chief complaint of Cellulitis (31 Jul 2023 12:54)      SUBJECTIVE / OVERNIGHT EVENTS: Comfortable without new complaints.   Review of Systems  chest pain no  palpitations no  sob no  nausea no  headache no    MEDICATIONS  (STANDING):  albuterol/ipratropium for Nebulization 3 milliLiter(s) Nebulizer every 6 hours  amitriptyline 10 milliGRAM(s) Oral at bedtime  atorvastatin 40 milliGRAM(s) Oral at bedtime  busPIRone 10 milliGRAM(s) Oral two times a day  ceFAZolin   IVPB 2000 milliGRAM(s) IV Intermittent every 8 hours  clotrimazole 1% Cream 1 Application(s) Topical two times a day  diltiazem    Tablet 60 milliGRAM(s) Oral every 6 hours  DULoxetine 60 milliGRAM(s) Oral daily  enoxaparin Injectable 40 milliGRAM(s) SubCutaneous every 24 hours  furosemide    Tablet 40 milliGRAM(s) Oral daily  gabapentin 300 milliGRAM(s) Oral two times a day  hydrALAZINE 75 milliGRAM(s) Oral two times a day  levothyroxine 125 MICROGram(s) Oral daily  losartan 100 milliGRAM(s) Oral daily  montelukast 10 milliGRAM(s) Oral daily  OLANZapine 10 milliGRAM(s) Oral daily  propranolol 20 milliGRAM(s) Oral two times a day  sucralfate 1 Gram(s) Oral four times a day  tiotropium 2.5 MICROgram(s) Inhaler 2 Puff(s) Inhalation daily    MEDICATIONS  (PRN):  acetaminophen     Tablet .. 650 milliGRAM(s) Oral every 6 hours PRN Temp greater or equal to 38.5C (101.3F), Mild Pain (1 - 3)  albuterol    90 MICROgram(s) HFA Inhaler 2 Puff(s) Inhalation every 6 hours PRN Shortness of Breath and/or Wheezing  LORazepam     Tablet 0.5 milliGRAM(s) Oral two times a day PRN Anxiety  melatonin 3 milliGRAM(s) Oral at bedtime PRN Insomnia  oxycodone    5 mG/acetaminophen 325 mG 1 Tablet(s) Oral every 8 hours PRN Moderate Pain (4 - 6)      Vital Signs Last 24 Hrs  T(C): 36.9 (31 Jul 2023 20:09), Max: 36.9 (31 Jul 2023 20:09)  T(F): 98.5 (31 Jul 2023 20:09), Max: 98.5 (31 Jul 2023 20:09)  HR: 65 (31 Jul 2023 20:09) (60 - 75)  BP: 126/60 (31 Jul 2023 20:09) (126/60 - 155/72)  BP(mean): --  RR: 19 (31 Jul 2023 20:09) (18 - 20)  SpO2: 96% (31 Jul 2023 20:09) (94% - 96%)    Parameters below as of 31 Jul 2023 20:09  Patient On (Oxygen Delivery Method): nasal cannula  O2 Flow (L/min): 2      PHYSICAL EXAM:  GENERAL: NAD, well-developed  HEAD:  Atraumatic, Normocephalic  EYES: EOMI, PERRLA, conjunctiva and sclera clear  NECK: Supple, No JVD  CHEST/LUNG: Clear to auscultation bilaterally; No wheeze  HEART: Regular rate and rhythm; No murmurs, rubs, or gallops  ABDOMEN: Soft, Nontender, Nondistended; Bowel sounds present  EXTREMITIES:  2-3+bipedal edema improving erythema bilateral L>R  PSYCH: AAOx3  NEUROLOGY: non-focal  SKIN: No rashes or lesions    LABS:                      RADIOLOGY & ADDITIONAL TESTS:    Imaging Personally Reviewed:    Consultant(s) Notes Reviewed:      Care Discussed with Consultants/Other Providers:  
Cardiovascular Disease Progress Note    Overnight events: No acute events overnight.  + edema. no cp/sob  Otherwise review of systems negative    Objective Findings:  T(C): 36.6 (07-27-23 @ 04:43), Max: 37 (07-26-23 @ 19:57)  HR: 59 (07-27-23 @ 04:43) (59 - 95)  BP: 114/66 (07-27-23 @ 04:43) (100/60 - 114/70)  RR: 18 (07-27-23 @ 04:43) (18 - 18)  SpO2: 96% (07-27-23 @ 04:43) (95% - 96%)  Wt(kg): --  Daily     Daily       Physical Exam:  Gen: NAD  HEENT: EOMI  CV: RRR, normal S1 + S2, no m/r/g  Lungs: CTAB  Abd: soft, non-tender  Ext: + edema    Telemetry:    Laboratory Data:                        11.1   10.25 )-----------( 179      ( 26 Jul 2023 06:33 )             35.7     07-26    142  |  104  |  20  ----------------------------<  108<H>  4.2   |  23  |  0.79    Ca    9.5      26 Jul 2023 06:35    TPro  7.2  /  Alb  3.8  /  TBili  0.8  /  DBili  x   /  AST  22  /  ALT  27  /  AlkPhos  102  07-25    PT/INR - ( 25 Jul 2023 11:32 )   PT: 11.2 sec;   INR: 0.97 ratio         PTT - ( 25 Jul 2023 11:32 )  PTT:45.6 sec          Inpatient Medications:  MEDICATIONS  (STANDING):  amitriptyline 10 milliGRAM(s) Oral at bedtime  atorvastatin 40 milliGRAM(s) Oral at bedtime  busPIRone 10 milliGRAM(s) Oral two times a day  ceFAZolin   IVPB 2000 milliGRAM(s) IV Intermittent every 8 hours  clotrimazole 1% Cream 1 Application(s) Topical two times a day  diltiazem    Tablet 60 milliGRAM(s) Oral every 6 hours  DULoxetine 60 milliGRAM(s) Oral daily  enoxaparin Injectable 40 milliGRAM(s) SubCutaneous every 24 hours  furosemide    Tablet 40 milliGRAM(s) Oral daily  gabapentin 300 milliGRAM(s) Oral two times a day  hydrALAZINE 75 milliGRAM(s) Oral two times a day  levothyroxine 125 MICROGram(s) Oral daily  losartan 100 milliGRAM(s) Oral daily  montelukast 10 milliGRAM(s) Oral daily  OLANZapine 10 milliGRAM(s) Oral daily  propranolol 20 milliGRAM(s) Oral two times a day  sucralfate 1 Gram(s) Oral four times a day  tiotropium 2.5 MICROgram(s) Inhaler 2 Puff(s) Inhalation daily      Assessment:  72-year-old female history of COPD on 2 L nasal cannula at home, Graves' disease, HLD, HTN, breast cancer, kidney cancer, skin cancer all in remission presenting with lower extremity edema and probable cellulitis      Recs:  cardiac stable  no e/o chf   recent TTE mild diastolic dysfunction, normal lv/rv and valves  cw lasix 40mg po qd  rec compression socks, ace wraps and leg elevation  recent le giovanni duplex neg for dvt  f/u ID recs. abx per id      Over 25 minutes spent on total encounter; more than 50% of the visit was spent counseling and/or coordinating care by the attending physician.      Juan Salcedo MD   Cardiovascular Disease  [phone]
Cardiovascular Disease Progress Note    Overnight events: No acute events overnight.  no new cardiac sx  Otherwise review of systems negative    Objective Findings:  T(C): 36.9 (08-01-23 @ 04:59), Max: 37 (08-01-23 @ 01:43)  HR: 67 (08-01-23 @ 04:59) (65 - 75)  BP: 136/74 (08-01-23 @ 04:59) (126/60 - 155/72)  RR: 19 (08-01-23 @ 04:59) (18 - 20)  SpO2: 96% (08-01-23 @ 04:59) (94% - 97%)  Wt(kg): --  Daily     Daily       Physical Exam:  Gen: NAD  HEENT: EOMI  CV: RRR, normal S1 + S2, no m/r/g  Lungs: CTAB  Abd: soft, non-tender  Ext: No edema    Telemetry:    Laboratory Data:                    Inpatient Medications:  MEDICATIONS  (STANDING):  albuterol/ipratropium for Nebulization 3 milliLiter(s) Nebulizer every 6 hours  amitriptyline 10 milliGRAM(s) Oral at bedtime  atorvastatin 40 milliGRAM(s) Oral at bedtime  busPIRone 10 milliGRAM(s) Oral two times a day  ceFAZolin   IVPB 2000 milliGRAM(s) IV Intermittent every 8 hours  clotrimazole 1% Cream 1 Application(s) Topical two times a day  diltiazem    Tablet 60 milliGRAM(s) Oral every 6 hours  DULoxetine 60 milliGRAM(s) Oral daily  enoxaparin Injectable 40 milliGRAM(s) SubCutaneous every 24 hours  furosemide    Tablet 40 milliGRAM(s) Oral daily  gabapentin 300 milliGRAM(s) Oral two times a day  hydrALAZINE 75 milliGRAM(s) Oral two times a day  levothyroxine 125 MICROGram(s) Oral daily  losartan 100 milliGRAM(s) Oral daily  montelukast 10 milliGRAM(s) Oral daily  OLANZapine 10 milliGRAM(s) Oral daily  propranolol 20 milliGRAM(s) Oral two times a day  sucralfate 1 Gram(s) Oral four times a day  tiotropium 2.5 MICROgram(s) Inhaler 2 Puff(s) Inhalation daily      Assessment:  72-year-old female history of COPD on 2 L nasal cannula at home, Graves' disease, HLD, HTN, breast cancer, kidney cancer, skin cancer all in remission presenting with lower extremity edema and probable cellulitis      Recs:  cardiac stable  no e/o chf   recent TTE mild diastolic dysfunction, normal lv/rv and valves  cw lasix 40mg po qd  rec compression socks, resume ace wraps and leg elevation  recent le giovanni duplex neg for dvt  f/u ID recs. abx per id  dcp        Over 25 minutes spent on total encounter; more than 50% of the visit was spent counseling and/or coordinating care by the attending physician.      Juan Salcedo MD   Cardiovascular Disease  [phone]
Patient is a 72y old  Female who presents with a chief complaint of Bilateral leg  Swelling and  erythemia (29 Jul 2023 00:51)      SUBJECTIVE / OVERNIGHT EVENTS: Comfortable without new complaints. Family at bedside.   Review of Systems  chest pain no  palpitations no  sob no  nausea no  headache no    MEDICATIONS  (STANDING):  amitriptyline 10 milliGRAM(s) Oral at bedtime  atorvastatin 40 milliGRAM(s) Oral at bedtime  busPIRone 10 milliGRAM(s) Oral two times a day  ceFAZolin   IVPB 2000 milliGRAM(s) IV Intermittent every 8 hours  clotrimazole 1% Cream 1 Application(s) Topical two times a day  diltiazem    Tablet 60 milliGRAM(s) Oral every 6 hours  DULoxetine 60 milliGRAM(s) Oral daily  enoxaparin Injectable 40 milliGRAM(s) SubCutaneous every 24 hours  furosemide    Tablet 40 milliGRAM(s) Oral daily  gabapentin 300 milliGRAM(s) Oral two times a day  hydrALAZINE 75 milliGRAM(s) Oral two times a day  levothyroxine 125 MICROGram(s) Oral daily  losartan 100 milliGRAM(s) Oral daily  montelukast 10 milliGRAM(s) Oral daily  OLANZapine 10 milliGRAM(s) Oral daily  propranolol 20 milliGRAM(s) Oral two times a day  sucralfate 1 Gram(s) Oral four times a day  tiotropium 2.5 MICROgram(s) Inhaler 2 Puff(s) Inhalation daily    MEDICATIONS  (PRN):  acetaminophen     Tablet .. 650 milliGRAM(s) Oral every 6 hours PRN Temp greater or equal to 38.5C (101.3F), Mild Pain (1 - 3)  albuterol    90 MICROgram(s) HFA Inhaler 2 Puff(s) Inhalation every 6 hours PRN Shortness of Breath and/or Wheezing  LORazepam     Tablet 0.5 milliGRAM(s) Oral two times a day PRN Anxiety  melatonin 3 milliGRAM(s) Oral at bedtime PRN Insomnia  oxycodone    5 mG/acetaminophen 325 mG 1 Tablet(s) Oral every 8 hours PRN Moderate Pain (4 - 6)      Vital Signs Last 24 Hrs  T(C): 36.7 (30 Jul 2023 15:40), Max: 36.7 (29 Jul 2023 19:42)  T(F): 98.1 (30 Jul 2023 15:40), Max: 98.1 (29 Jul 2023 19:42)  HR: 69 (30 Jul 2023 15:40) (62 - 69)  BP: 154/71 (30 Jul 2023 15:40) (135/82 - 154/71)  BP(mean): --  RR: 18 (30 Jul 2023 15:40) (18 - 18)  SpO2: 95% (30 Jul 2023 15:40) (95% - 99%)    Parameters below as of 30 Jul 2023 15:40  Patient On (Oxygen Delivery Method): nasal cannula  O2 Flow (L/min): 2      PHYSICAL EXAM:  GENERAL: NAD, well-developed  HEAD:  Atraumatic, Normocephalic  EYES: EOMI, PERRLA, conjunctiva and sclera clear  NECK: Supple, No JVD  CHEST/LUNG: Clear to auscultation bilaterally; No wheeze  HEART: Regular rate and rhythm; No murmurs, rubs, or gallops  ABDOMEN: Soft, Nontender, Nondistended; Bowel sounds present  EXTREMITIES:  2-3 + bipedal edema, bilateral erythema improving   PSYCH: AAOx3, anxious  NEUROLOGY: non-focal  SKIN: No rashes or lesions    LABS:                      RADIOLOGY & ADDITIONAL TESTS:    Imaging Personally Reviewed:    Consultant(s) Notes Reviewed:      Care Discussed with Consultants/Other Providers:  
Patient is a 72y old  Female who presents with a chief complaint of Cellulitis     (28 Jul 2023 09:41)      SUBJECTIVE / OVERNIGHT EVENTS: feels better.  Review of Systems  chest pain no  palpitations no  sob no  nausea no  headache no    MEDICATIONS  (STANDING):  amitriptyline 10 milliGRAM(s) Oral at bedtime  atorvastatin 40 milliGRAM(s) Oral at bedtime  busPIRone 10 milliGRAM(s) Oral two times a day  ceFAZolin   IVPB 2000 milliGRAM(s) IV Intermittent every 8 hours  clotrimazole 1% Cream 1 Application(s) Topical two times a day  diltiazem    Tablet 60 milliGRAM(s) Oral every 6 hours  DULoxetine 60 milliGRAM(s) Oral daily  enoxaparin Injectable 40 milliGRAM(s) SubCutaneous every 24 hours  furosemide    Tablet 40 milliGRAM(s) Oral daily  gabapentin 300 milliGRAM(s) Oral two times a day  hydrALAZINE 75 milliGRAM(s) Oral two times a day  levothyroxine 125 MICROGram(s) Oral daily  losartan 100 milliGRAM(s) Oral daily  montelukast 10 milliGRAM(s) Oral daily  OLANZapine 10 milliGRAM(s) Oral daily  propranolol 20 milliGRAM(s) Oral two times a day  sucralfate 1 Gram(s) Oral four times a day  tiotropium 2.5 MICROgram(s) Inhaler 2 Puff(s) Inhalation daily    MEDICATIONS  (PRN):  acetaminophen     Tablet .. 650 milliGRAM(s) Oral every 6 hours PRN Temp greater or equal to 38.5C (101.3F), Mild Pain (1 - 3)  albuterol    90 MICROgram(s) HFA Inhaler 2 Puff(s) Inhalation every 6 hours PRN Shortness of Breath and/or Wheezing  LORazepam     Tablet 0.5 milliGRAM(s) Oral two times a day PRN Anxiety  melatonin 3 milliGRAM(s) Oral at bedtime PRN Insomnia  oxycodone    5 mG/acetaminophen 325 mG 1 Tablet(s) Oral every 8 hours PRN Moderate Pain (4 - 6)      Vital Signs Last 24 Hrs  T(C): 36.6 (28 Jul 2023 04:52), Max: 37.1 (27 Jul 2023 19:52)  T(F): 97.9 (28 Jul 2023 04:52), Max: 98.7 (27 Jul 2023 19:52)  HR: 58 (28 Jul 2023 04:52) (58 - 78)  BP: 127/75 (28 Jul 2023 04:52) (111/56 - 139/78)  BP(mean): --  RR: 18 (28 Jul 2023 04:52) (18 - 18)  SpO2: 97% (28 Jul 2023 04:52) (93% - 98%)    Parameters below as of 28 Jul 2023 04:52  Patient On (Oxygen Delivery Method): nasal cannula  O2 Flow (L/min): 2      PHYSICAL EXAM:  GENERAL: NAD, well-developed  HEAD:  Atraumatic, Normocephalic  EYES: EOMI, PERRLA, conjunctiva and sclera clear  NECK: Supple, No JVD  CHEST/LUNG: Clear to auscultation bilaterally; No wheeze  HEART: Regular rate and rhythm; No murmurs, rubs, or gallops  ABDOMEN: Soft, Nontender, Nondistended; Bowel sounds present  EXTREMITIES:  2+bipedal edema, erythema improving   PSYCH: AAOx3, anxious  NEUROLOGY: non-focal  SKIN: No rashes or lesions    LABS:                        11.6   7.70  )-----------( 172      ( 28 Jul 2023 07:21 )             36.8     07-28    139  |  103  |  21  ----------------------------<  126<H>  4.1   |  24  |  0.78    Ca    9.6      28 Jul 2023 07:22            Urinalysis Basic - ( 28 Jul 2023 07:22 )    Color: x / Appearance: x / SG: x / pH: x  Gluc: 126 mg/dL / Ketone: x  / Bili: x / Urobili: x   Blood: x / Protein: x / Nitrite: x   Leuk Esterase: x / RBC: x / WBC x   Sq Epi: x / Non Sq Epi: x / Bacteria: x        Culture - Blood (collected 25 Jul 2023 11:55)  Source: .Blood Blood-Peripheral  Preliminary Report (27 Jul 2023 18:01):    No growth at 48 Hours    Culture - Blood (collected 25 Jul 2023 11:50)  Source: .Blood Blood-Peripheral  Preliminary Report (27 Jul 2023 18:01):    No growth at 48 Hours        RADIOLOGY & ADDITIONAL TESTS:    Imaging Personally Reviewed:    Consultant(s) Notes Reviewed:      Care Discussed with Consultants/Other Providers:  
Patient is a 72y old  Female who presents with a chief complaint of Bilateral leg  Swelling and  erythemia (29 Jul 2023 00:51)      SUBJECTIVE / OVERNIGHT EVENTS: No new complaints. Still with tenderness legs.   Review of Systems  chest pain no  palpitations no  sob no  nausea no  headache no    MEDICATIONS  (STANDING):  amitriptyline 10 milliGRAM(s) Oral at bedtime  atorvastatin 40 milliGRAM(s) Oral at bedtime  busPIRone 10 milliGRAM(s) Oral two times a day  ceFAZolin   IVPB 2000 milliGRAM(s) IV Intermittent every 8 hours  clotrimazole 1% Cream 1 Application(s) Topical two times a day  diltiazem    Tablet 60 milliGRAM(s) Oral every 6 hours  DULoxetine 60 milliGRAM(s) Oral daily  enoxaparin Injectable 40 milliGRAM(s) SubCutaneous every 24 hours  furosemide    Tablet 40 milliGRAM(s) Oral daily  gabapentin 300 milliGRAM(s) Oral two times a day  hydrALAZINE 75 milliGRAM(s) Oral two times a day  levothyroxine 125 MICROGram(s) Oral daily  losartan 100 milliGRAM(s) Oral daily  montelukast 10 milliGRAM(s) Oral daily  OLANZapine 10 milliGRAM(s) Oral daily  propranolol 20 milliGRAM(s) Oral two times a day  sucralfate 1 Gram(s) Oral four times a day  tiotropium 2.5 MICROgram(s) Inhaler 2 Puff(s) Inhalation daily    MEDICATIONS  (PRN):  acetaminophen     Tablet .. 650 milliGRAM(s) Oral every 6 hours PRN Temp greater or equal to 38.5C (101.3F), Mild Pain (1 - 3)  albuterol    90 MICROgram(s) HFA Inhaler 2 Puff(s) Inhalation every 6 hours PRN Shortness of Breath and/or Wheezing  LORazepam     Tablet 0.5 milliGRAM(s) Oral two times a day PRN Anxiety  melatonin 3 milliGRAM(s) Oral at bedtime PRN Insomnia  oxycodone    5 mG/acetaminophen 325 mG 1 Tablet(s) Oral every 8 hours PRN Moderate Pain (4 - 6)      Vital Signs Last 24 Hrs  T(C): 36.9 (29 Jul 2023 16:23), Max: 36.9 (29 Jul 2023 16:23)  T(F): 98.4 (29 Jul 2023 16:23), Max: 98.4 (29 Jul 2023 16:23)  HR: 71 (29 Jul 2023 16:23) (66 - 75)  BP: 142/52 (29 Jul 2023 16:23) (106/58 - 142/52)  BP(mean): --  RR: 18 (29 Jul 2023 16:23) (18 - 18)  SpO2: 94% (29 Jul 2023 16:23) (92% - 95%)    Parameters below as of 29 Jul 2023 16:23  Patient On (Oxygen Delivery Method): nasal cannula  O2 Flow (L/min): 2      PHYSICAL EXAM:  GENERAL: NAD, well-developed  HEAD:  Atraumatic, Normocephalic  EYES: EOMI, PERRLA, conjunctiva and sclera clear  NECK: Supple, No JVD  CHEST/LUNG: Clear to auscultation bilaterally; No wheeze  HEART: Regular rate and rhythm; No murmurs, rubs, or gallops  ABDOMEN: Soft, Nontender, Nondistended; Bowel sounds present  EXTREMITIES:  2-3+ bipedal edema improving erythema but still present bilaterally.   PSYCH: AAOx3  NEUROLOGY: non-focal  SKIN: No rashes or lesions    LABS:                        11.6   7.70  )-----------( 172      ( 28 Jul 2023 07:21 )             36.8     07-28    139  |  103  |  21  ----------------------------<  126<H>  4.1   |  24  |  0.78    Ca    9.6      28 Jul 2023 07:22            Urinalysis Basic - ( 28 Jul 2023 07:22 )    Color: x / Appearance: x / SG: x / pH: x  Gluc: 126 mg/dL / Ketone: x  / Bili: x / Urobili: x   Blood: x / Protein: x / Nitrite: x   Leuk Esterase: x / RBC: x / WBC x   Sq Epi: x / Non Sq Epi: x / Bacteria: x          RADIOLOGY & ADDITIONAL TESTS:    Imaging Personally Reviewed:    Consultant(s) Notes Reviewed:      Care Discussed with Consultants/Other Providers:  
Patient is a 72y old  Female who presents with a chief complaint of Cellulitis (26 Jul 2023 16:20)      SUBJECTIVE / OVERNIGHT EVENTS:  Review of Systems  chest pain no  palpitations no  sob no  nausea no  headache no    MEDICATIONS  (STANDING):  amitriptyline 10 milliGRAM(s) Oral at bedtime  atorvastatin 40 milliGRAM(s) Oral at bedtime  busPIRone 10 milliGRAM(s) Oral two times a day  ceFAZolin   IVPB 2000 milliGRAM(s) IV Intermittent every 8 hours  diltiazem    Tablet 60 milliGRAM(s) Oral every 6 hours  DULoxetine 60 milliGRAM(s) Oral daily  enoxaparin Injectable 40 milliGRAM(s) SubCutaneous every 24 hours  furosemide    Tablet 40 milliGRAM(s) Oral daily  gabapentin 300 milliGRAM(s) Oral two times a day  hydrALAZINE 75 milliGRAM(s) Oral two times a day  levothyroxine 125 MICROGram(s) Oral daily  losartan 100 milliGRAM(s) Oral daily  montelukast 10 milliGRAM(s) Oral daily  OLANZapine 10 milliGRAM(s) Oral daily  propranolol 20 milliGRAM(s) Oral two times a day  sucralfate 1 Gram(s) Oral four times a day  tiotropium 2.5 MICROgram(s) Inhaler 2 Puff(s) Inhalation daily    MEDICATIONS  (PRN):  acetaminophen     Tablet .. 650 milliGRAM(s) Oral every 6 hours PRN Temp greater or equal to 38.5C (101.3F), Mild Pain (1 - 3)  albuterol    90 MICROgram(s) HFA Inhaler 2 Puff(s) Inhalation every 6 hours PRN Shortness of Breath and/or Wheezing  LORazepam     Tablet 0.5 milliGRAM(s) Oral two times a day PRN Anxiety  melatonin 3 milliGRAM(s) Oral at bedtime PRN Insomnia  oxycodone    5 mG/acetaminophen 325 mG 1 Tablet(s) Oral every 8 hours PRN Moderate Pain (4 - 6)      Vital Signs Last 24 Hrs  T(C): 36.8 (26 Jul 2023 13:37), Max: 36.8 (26 Jul 2023 13:37)  T(F): 98.2 (26 Jul 2023 13:37), Max: 98.2 (26 Jul 2023 13:37)  HR: 95 (26 Jul 2023 13:37) (80 - 95)  BP: 114/70 (26 Jul 2023 13:37) (114/70 - 143/75)  BP(mean): --  RR: 18 (26 Jul 2023 13:37) (18 - 18)  SpO2: 95% (26 Jul 2023 13:37) (94% - 98%)    Parameters below as of 26 Jul 2023 13:37  Patient On (Oxygen Delivery Method): nasal cannula  O2 Flow (L/min): 2      PHYSICAL EXAM:  GENERAL: NAD, well-developed  HEAD:  Atraumatic, Normocephalic  EYES: EOMI, PERRLA, conjunctiva and sclera clear  NECK: Supple, No JVD  CHEST/LUNG: Clear to auscultation bilaterally; No wheeze  HEART: Regular rate and rhythm; No murmurs, rubs, or gallops  ABDOMEN: Soft, Nontender, Nondistended; Bowel sounds present  EXTREMITIES:  3+ bipedal edema erythema improving   PSYCH: AAOx3, anxious  NEUROLOGY: non-focal  SKIN: No rashes or lesions    LABS:                        11.1   10.25 )-----------( 179      ( 26 Jul 2023 06:33 )             35.7     07-26    142  |  104  |  20  ----------------------------<  108<H>  4.2   |  23  |  0.79    Ca    9.5      26 Jul 2023 06:35    TPro  7.2  /  Alb  3.8  /  TBili  0.8  /  DBili  x   /  AST  22  /  ALT  27  /  AlkPhos  102  07-25    PT/INR - ( 25 Jul 2023 11:32 )   PT: 11.2 sec;   INR: 0.97 ratio         PTT - ( 25 Jul 2023 11:32 )  PTT:45.6 sec      Urinalysis Basic - ( 26 Jul 2023 06:35 )    Color: x / Appearance: x / SG: x / pH: x  Gluc: 108 mg/dL / Ketone: x  / Bili: x / Urobili: x   Blood: x / Protein: x / Nitrite: x   Leuk Esterase: x / RBC: x / WBC x   Sq Epi: x / Non Sq Epi: x / Bacteria: x        Culture - Blood (collected 25 Jul 2023 11:55)  Source: .Blood Blood-Peripheral  Preliminary Report (26 Jul 2023 18:01):    No growth at 24 hours    Culture - Blood (collected 25 Jul 2023 11:50)  Source: .Blood Blood-Peripheral  Preliminary Report (26 Jul 2023 18:01):    No growth at 24 hours        RADIOLOGY & ADDITIONAL TESTS:    Imaging Personally Reviewed:    Consultant(s) Notes Reviewed:      Care Discussed with Consultants/Other Providers:  
Patient is a 72y old  Female who presents with a chief complaint of Cellulitis (26 Jul 2023 16:20)      SUBJECTIVE / OVERNIGHT EVENTS: Comfortable without new complaints. Family at bedside.   Review of Systems  chest pain no  palpitations no  sob no  nausea no  headache no    MEDICATIONS  (STANDING):  amitriptyline 10 milliGRAM(s) Oral at bedtime  atorvastatin 40 milliGRAM(s) Oral at bedtime  busPIRone 10 milliGRAM(s) Oral two times a day  ceFAZolin   IVPB 2000 milliGRAM(s) IV Intermittent every 8 hours  clotrimazole 1% Cream 1 Application(s) Topical two times a day  diltiazem    Tablet 60 milliGRAM(s) Oral every 6 hours  DULoxetine 60 milliGRAM(s) Oral daily  enoxaparin Injectable 40 milliGRAM(s) SubCutaneous every 24 hours  furosemide    Tablet 40 milliGRAM(s) Oral daily  gabapentin 300 milliGRAM(s) Oral two times a day  hydrALAZINE 75 milliGRAM(s) Oral two times a day  levothyroxine 125 MICROGram(s) Oral daily  losartan 100 milliGRAM(s) Oral daily  montelukast 10 milliGRAM(s) Oral daily  OLANZapine 10 milliGRAM(s) Oral daily  propranolol 20 milliGRAM(s) Oral two times a day  sucralfate 1 Gram(s) Oral four times a day  tiotropium 2.5 MICROgram(s) Inhaler 2 Puff(s) Inhalation daily    MEDICATIONS  (PRN):  acetaminophen     Tablet .. 650 milliGRAM(s) Oral every 6 hours PRN Temp greater or equal to 38.5C (101.3F), Mild Pain (1 - 3)  albuterol    90 MICROgram(s) HFA Inhaler 2 Puff(s) Inhalation every 6 hours PRN Shortness of Breath and/or Wheezing  LORazepam     Tablet 0.5 milliGRAM(s) Oral two times a day PRN Anxiety  melatonin 3 milliGRAM(s) Oral at bedtime PRN Insomnia  oxycodone    5 mG/acetaminophen 325 mG 1 Tablet(s) Oral every 8 hours PRN Moderate Pain (4 - 6)      Vital Signs Last 24 Hrs  T(C): 36.6 (27 Jul 2023 04:43), Max: 37 (26 Jul 2023 19:57)  T(F): 97.8 (27 Jul 2023 04:43), Max: 98.6 (26 Jul 2023 19:57)  HR: 59 (27 Jul 2023 04:43) (59 - 95)  BP: 114/66 (27 Jul 2023 04:43) (100/60 - 114/70)  BP(mean): --  RR: 18 (27 Jul 2023 04:43) (18 - 18)  SpO2: 96% (27 Jul 2023 04:43) (95% - 96%)    Parameters below as of 27 Jul 2023 04:43  Patient On (Oxygen Delivery Method): nasal cannula  O2 Flow (L/min): 2      PHYSICAL EXAM:  GENERAL: NAD, well-developed  HEAD:  Atraumatic, Normocephalic  EYES: EOMI, PERRLA, conjunctiva and sclera clear  NECK: Supple, No JVD  CHEST/LUNG: Clear to auscultation bilaterally; No wheeze  HEART: Regular rate and rhythm; No murmurs, rubs, or gallops  ABDOMEN: Soft, Nontender, Nondistended; Bowel sounds present  EXTREMITIES:  2-3+ bipedal edema erythema improving   PSYCH: AAOx3, anxious   NEUROLOGY: non-focal  SKIN: No rashes or lesions    LABS:                        11.1   7.54  )-----------( 161      ( 27 Jul 2023 07:01 )             36.1     07-27    137  |  101  |  24<H>  ----------------------------<  139<H>  3.9   |  24  |  0.88    Ca    9.4      27 Jul 2023 07:01            Urinalysis Basic - ( 27 Jul 2023 07:01 )    Color: x / Appearance: x / SG: x / pH: x  Gluc: 139 mg/dL / Ketone: x  / Bili: x / Urobili: x   Blood: x / Protein: x / Nitrite: x   Leuk Esterase: x / RBC: x / WBC x   Sq Epi: x / Non Sq Epi: x / Bacteria: x        Culture - Blood (collected 25 Jul 2023 11:55)  Source: .Blood Blood-Peripheral  Preliminary Report (26 Jul 2023 18:01):    No growth at 24 hours    Culture - Blood (collected 25 Jul 2023 11:50)  Source: .Blood Blood-Peripheral  Preliminary Report (26 Jul 2023 18:01):    No growth at 24 hours        RADIOLOGY & ADDITIONAL TESTS:    Imaging Personally Reviewed:    Consultant(s) Notes Reviewed:      Care Discussed with Consultants/Other Providers:  
INFECTIOUS DISEASES FOLLOW UP--Michael Oppenheim, MD      Interval History:  Feels slightly better - notes more improvement in left leg than right  Feels right leg less tender and warm, erythema not as improved.    MEDICATIONS:  antimicrobials  ceFAZolin   IVPB 2000 milliGRAM(s) IV Intermittent every 8 hours D#3    immunologic:    OTHER:  acetaminophen     Tablet .. 650 milliGRAM(s) Oral every 6 hours PRN  albuterol    90 MICROgram(s) HFA Inhaler 2 Puff(s) Inhalation every 6 hours PRN  amitriptyline 10 milliGRAM(s) Oral at bedtime  atorvastatin 40 milliGRAM(s) Oral at bedtime  busPIRone 10 milliGRAM(s) Oral two times a day  clotrimazole 1% Cream 1 Application(s) Topical two times a day  diltiazem    Tablet 60 milliGRAM(s) Oral every 6 hours  DULoxetine 60 milliGRAM(s) Oral daily  enoxaparin Injectable 40 milliGRAM(s) SubCutaneous every 24 hours  furosemide    Tablet 40 milliGRAM(s) Oral daily  gabapentin 300 milliGRAM(s) Oral two times a day  hydrALAZINE 75 milliGRAM(s) Oral two times a day  levothyroxine 125 MICROGram(s) Oral daily  LORazepam     Tablet 0.5 milliGRAM(s) Oral two times a day PRN  losartan 100 milliGRAM(s) Oral daily  melatonin 3 milliGRAM(s) Oral at bedtime PRN  montelukast 10 milliGRAM(s) Oral daily  OLANZapine 10 milliGRAM(s) Oral daily  oxycodone    5 mG/acetaminophen 325 mG 1 Tablet(s) Oral every 8 hours PRN  propranolol 20 milliGRAM(s) Oral two times a day  sucralfate 1 Gram(s) Oral four times a day  tiotropium 2.5 MICROgram(s) Inhaler 2 Puff(s) Inhalation daily      Objective:  T(C): 36.4 (07-28-23 @ 13:22), Max: 37.1 (07-27-23 @ 19:52)  HR: 64 (07-28-23 @ 13:22) (58 - 78)  BP: 104/52 (07-28-23 @ 13:22) (104/52 - 139/78)  RR: 18 (07-28-23 @ 13:22) (18 - 18)  SpO2: 95% (07-28-23 @ 13:22) (93% - 97%)    PHYSICAL EXAM:  Extremities: RLE with significantly decreased intensity of erythema, minimal warmth. Left leg with persistent erythema above ankle and below knee, receded somewhat from marked borders, decreased tenderness      LABS:                        11.6   7.70  )-----------( 172      ( 28 Jul 2023 07:21 )             36.8     07-28    139  |  103  |  21  ----------------------------<  126<H>  4.1   |  24  |  0.78    Ca    9.6      28 Jul 2023 07:22                    
f/u cellulitis    Interval History:  better, little pain.  no fever.  ROS otherwise negative.      PAST MEDICAL & SURGICAL HISTORY:  Hypertension  Hyperlipidemia  Anxiety  Graves disease  Kidney cancer, primary, with metastasis from kidney to other site, left  LEft sided- s/p nephrectomy only- no chemo or RT  Breast cancer in situ, left  COPD (chronic obstructive pulmonary disease)  Hypothyroid  DVT (deep venous thrombosis)  history of- not presently on A/C  Obesity  Sleep apnea  Asthma  S/P cholecystectomy  S/P breast biopsy left  History of pelvic surgery    ANTIMICROBIALS:  vancomycin  IVPB (7/25 x1)  active:  ceFAZolin   IVPB 2000 every 8 hours (7/25-)    MEDICATIONS  (STANDING):  albuterol/ipratropium for Nebulization 3 every 6 hours  amitriptyline 10 at bedtime  atorvastatin 40 at bedtime  busPIRone 10 two times a day  diltiazem    Tablet 60 every 6 hours  DULoxetine 60 daily  enoxaparin Injectable 40 every 24 hours  furosemide    Tablet 40 daily  gabapentin 300 two times a day  hydrALAZINE 75 two times a day  levothyroxine 125 daily  losartan 100 daily  montelukast 10 daily  OLANZapine 10 daily  propranolol 20 two times a day  sucralfate 1 four times a day  tiotropium 2.5 MICROgram(s) Inhaler 2 daily    Vital Signs Last 24 Hrs  T(F): 98.1 (08-01-23 @ 10:11), Max: 98.6 (08-01-23 @ 01:43)  HR: 73 (08-01-23 @ 10:11)  BP: 149/76 (08-01-23 @ 10:11)  RR: 19 (08-01-23 @ 10:11)  SpO2: 96% (08-01-23 @ 10:11) (94% - 97%)  Wt(kg): --    PHYSICAL EXAM:  Constitutional: non-toxic  HEAD/EYES: anicteric  ENT:  supple  Cardiovascular:   normal S1, S2  Respiratory:  clear BS bilaterally  GI:  soft, non-tender, normal bowel sounds  :  no romero  Musculoskeletal:  no synovitis  Neurologic: awake and alert, normal strength, no focal findings  Skin:  L > R cellulitis, better  Psychiatric:  awake, alert, appropriate mood    RADIOLOGY:  VA Duplex Lower Ext Vein Scan, Bilat (07.25.23 @ 14:11) >  IMPRESSION:  Limited examination.  No DVT identified.    Xray Chest 1 View- PORTABLE-Urgent (Xray Chest 1 View- PORTABLE-Urgent .) (07.25.23 @ 12:36) >  IMPRESSION:  No focal consolidation.                    
INFECTIOUS DISEASES FOLLOW UP--Michael Oppenheim, MD      Interval History:  Patient feels subjectively that legs are improved, somewhat less redness   Otherwise not change, no complaints    MEDICATIONS:  antimicrobials  ceFAZolin   IVPB 2000 milliGRAM(s) IV Intermittent every 8 hours (Day 2)    immunologic:    OTHER:  acetaminophen     Tablet .. 650 milliGRAM(s) Oral every 6 hours PRN  albuterol    90 MICROgram(s) HFA Inhaler 2 Puff(s) Inhalation every 6 hours PRN  amitriptyline 10 milliGRAM(s) Oral at bedtime  atorvastatin 40 milliGRAM(s) Oral at bedtime  busPIRone 10 milliGRAM(s) Oral two times a day  diltiazem    Tablet 60 milliGRAM(s) Oral every 6 hours  DULoxetine 60 milliGRAM(s) Oral daily  enoxaparin Injectable 40 milliGRAM(s) SubCutaneous every 24 hours  furosemide    Tablet 40 milliGRAM(s) Oral daily  gabapentin 300 milliGRAM(s) Oral two times a day  hydrALAZINE 75 milliGRAM(s) Oral two times a day  levothyroxine 125 MICROGram(s) Oral daily  LORazepam     Tablet 0.5 milliGRAM(s) Oral two times a day PRN  losartan 100 milliGRAM(s) Oral daily  melatonin 3 milliGRAM(s) Oral at bedtime PRN  montelukast 10 milliGRAM(s) Oral daily  OLANZapine 10 milliGRAM(s) Oral daily  oxycodone    5 mG/acetaminophen 325 mG 1 Tablet(s) Oral every 8 hours PRN  propranolol 20 milliGRAM(s) Oral two times a day  sucralfate 1 Gram(s) Oral four times a day  tiotropium 2.5 MICROgram(s) Inhaler 2 Puff(s) Inhalation daily      Objective:  T(C): 36.8 (07-26-23 @ 13:37), Max: 36.8 (07-26-23 @ 13:37)  HR: 95 (07-26-23 @ 13:37) (80 - 95)  BP: 114/70 (07-26-23 @ 13:37) (114/70 - 143/75)  RR: 18 (07-26-23 @ 13:37) (18 - 18)  SpO2: 95% (07-26-23 @ 13:37) (94% - 98%)    PHYSICAL EXAM:  Respiratory: Faint basilar crackles on left, o/w CTA  Cardiovascular: RRR. Distant.   Gastrointestinal: Soft. +BS. Mildly tender in upper midline - hernia felt. No HSM  Extremities: Left leg with decreased erythema intensity, persistent warmth. Right leg with slight decreased intensity, slightly decreased warmth, receded slightly from area of demarcation. Scaling of feet skin without overt fissure      LABS:                        11.1   10.25 )-----------( 179      ( 26 Jul 2023 06:33 )             35.7     07-26    142  |  104  |  20  ----------------------------<  108<H>  4.2   |  23  |  0.79    Ca    9.5      26 Jul 2023 06:35    TPro  7.2  /  Alb  3.8  /  TBili  0.8  /  DBili  x   /  AST  22  /  ALT  27  /  AlkPhos  102  07-25    MRSA/MSSA PCR (07.25.23 @ 14:46)    MRSA PCR Result.: NotDetec: The results of this test should be interpreted with consideration of  clinical context.  Not Detected result indicates the absence of organisms or that the number  of organisms is below the assay limit of detection.  Detected result indicates the presence of organism nucleic acid.  Indeterminate result may indicate the presence of amplification  inhibitors in specimen; presence or absence of organisms cannot be  determined. Consider collecting new specimen if further testing is still  needed.  This qualitative PCR assay is FDA-approved, and its performance was  established by [facility] [employer], [place], [place].   Staph aureus PCR Result: NotDete                  
f/u cellulitis    Interval History:    a little better.  no fever.      PAST MEDICAL & SURGICAL HISTORY:  Hypertension  Hyperlipidemia  Anxiety  Graves disease  Kidney cancer, primary, with metastasis from kidney to other site, left  LEft sided- s/p nephrectomy only- no chemo or RT  Breast cancer in situ, left  COPD (chronic obstructive pulmonary disease)  Hypothyroid  DVT (deep venous thrombosis)  history of- not presently on A/C  Obesity  Sleep apnea  Asthma  S/P cholecystectomy  S/p nephrectomy  left  S/P breast biopsy  left  History of pelvic surgery    MEDICATIONS  (STANDING):  amitriptyline 10 at bedtime  atorvastatin 40 at bedtime  busPIRone 10 two times a day  diltiazem    Tablet 60 every 6 hours  DULoxetine 60 daily  enoxaparin Injectable 40 every 24 hours  furosemide    Tablet 40 daily  gabapentin 300 two times a day  hydrALAZINE 75 two times a day  levothyroxine 125 daily  losartan 100 daily  montelukast 10 daily  OLANZapine 10 daily  propranolol 20 two times a day  sucralfate 1 four times a day  tiotropium 2.5 MICROgram(s) Inhaler 2 daily    ANTIMICROBIALS:  vancomycin  IVPB (7/25 x1)  active:  ceFAZolin   IVPB 2000 every 8 hours (7/25-)    MEDICATIONS  (STANDING):  amitriptyline 10 at bedtime  atorvastatin 40 at bedtime  busPIRone 10 two times a day  diltiazem    Tablet 60 every 6 hours  DULoxetine 60 daily  enoxaparin Injectable 40 every 24 hours  furosemide    Tablet 40 daily  gabapentin 300 two times a day  hydrALAZINE 75 two times a day  levothyroxine 125 daily  losartan 100 daily  montelukast 10 daily  OLANZapine 10 daily  propranolol 20 two times a day  sucralfate 1 four times a day  tiotropium 2.5 MICROgram(s) Inhaler 2 daily    Vital Signs Last 24 Hrs  T(F): 97.4 (07-31-23 @ 11:40), Max: 98.3 (07-30-23 @ 19:28)  HR: 75 (07-31-23 @ 11:40)  BP: 140/69 (07-31-23 @ 11:40)  RR: 20 (07-31-23 @ 11:40)  SpO2: 94% (07-31-23 @ 11:40) (94% - 99%)    PHYSICAL EXAM:  Constitutional: non-toxic  HEAD/EYES: anicteric  ENT:  supple  Cardiovascular:   normal S1, S2  Respiratory:  clear BS bilaterally  GI:  soft, non-tender, normal bowel sounds  :  no romero  Musculoskeletal:  no synovitis  Neurologic: awake and alert, normal strength, no focal findings  Skin:  L > R cellulitis, improving  Psychiatric:  awake, alert, appropriate mood    WBC Count: 7.70 (07-28-23 @ 07:21)  WBC Count: 7.54 (07-27-23 @ 07:01)  WBC Count: 10.25 (07-26-23 @ 06:33)  WBC Count: 9.21 (07-25-23 @ 11:32)    RADIOLOGY:  VA Duplex Lower Ext Vein Scan, Bilat (07.25.23 @ 14:11) >  IMPRESSION:  Limited examination.  No DVT identified.    Xray Chest 1 View- PORTABLE-Urgent (Xray Chest 1 View- PORTABLE-Urgent .) (07.25.23 @ 12:36) >  IMPRESSION:  No focal consolidation.

## 2023-08-02 ENCOUNTER — TRANSCRIPTION ENCOUNTER (OUTPATIENT)
Age: 73
End: 2023-08-02

## 2023-08-09 NOTE — ED ADULT TRIAGE NOTE - NS ED NURSE BANDS TYPE
Name band;
You can access the FollowMyHealth Patient Portal offered by [facility] by registering at the following website: [email or web address]. By joining [employer]’s FollowMyHealth portal, you will also be able to view your health information using other applications (apps) compatible with our system.

## 2023-08-23 PROCEDURE — 99443: CPT | Mod: 95

## 2023-08-27 ENCOUNTER — INPATIENT (INPATIENT)
Facility: HOSPITAL | Age: 73
LOS: 4 days | Discharge: HOME | DRG: 606 | End: 2023-09-01
Attending: INTERNAL MEDICINE | Admitting: INTERNAL MEDICINE
Payer: MEDICARE

## 2023-08-27 VITALS
OXYGEN SATURATION: 95 % | HEIGHT: 60 IN | HEART RATE: 77 BPM | SYSTOLIC BLOOD PRESSURE: 129 MMHG | DIASTOLIC BLOOD PRESSURE: 80 MMHG | RESPIRATION RATE: 20 BRPM | WEIGHT: 199.96 LBS | TEMPERATURE: 98 F

## 2023-08-27 DIAGNOSIS — Z98.89 OTHER SPECIFIED POSTPROCEDURAL STATES: Chronic | ICD-10-CM

## 2023-08-27 DIAGNOSIS — Z90.49 ACQUIRED ABSENCE OF OTHER SPECIFIED PARTS OF DIGESTIVE TRACT: Chronic | ICD-10-CM

## 2023-08-27 DIAGNOSIS — I89.0 LYMPHEDEMA, NOT ELSEWHERE CLASSIFIED: ICD-10-CM

## 2023-08-27 DIAGNOSIS — Z90.5 ACQUIRED ABSENCE OF KIDNEY: Chronic | ICD-10-CM

## 2023-08-27 LAB
ALBUMIN SERPL ELPH-MCNC: 3.5 G/DL — SIGNIFICANT CHANGE UP (ref 3.3–5)
ALP SERPL-CCNC: 113 U/L — SIGNIFICANT CHANGE UP (ref 40–120)
ALT FLD-CCNC: 11 U/L — SIGNIFICANT CHANGE UP (ref 10–45)
ANION GAP SERPL CALC-SCNC: 9 MMOL/L — SIGNIFICANT CHANGE UP (ref 5–17)
AST SERPL-CCNC: 13 U/L — SIGNIFICANT CHANGE UP (ref 10–40)
BASE EXCESS BLDV CALC-SCNC: 7 MMOL/L — HIGH (ref -2–3)
BASOPHILS # BLD AUTO: 0.05 K/UL — SIGNIFICANT CHANGE UP (ref 0–0.2)
BASOPHILS NFR BLD AUTO: 0.4 % — SIGNIFICANT CHANGE UP (ref 0–2)
BILIRUB SERPL-MCNC: 0.6 MG/DL — SIGNIFICANT CHANGE UP (ref 0.2–1.2)
BUN SERPL-MCNC: 19 MG/DL — SIGNIFICANT CHANGE UP (ref 7–23)
CA-I SERPL-SCNC: 1.31 MMOL/L — SIGNIFICANT CHANGE UP (ref 1.15–1.33)
CALCIUM SERPL-MCNC: 10 MG/DL — SIGNIFICANT CHANGE UP (ref 8.4–10.5)
CHLORIDE BLDV-SCNC: 103 MMOL/L — SIGNIFICANT CHANGE UP (ref 96–108)
CHLORIDE SERPL-SCNC: 102 MMOL/L — SIGNIFICANT CHANGE UP (ref 96–108)
CO2 BLDV-SCNC: 36 MMOL/L — HIGH (ref 22–26)
CO2 SERPL-SCNC: 30 MMOL/L — SIGNIFICANT CHANGE UP (ref 22–31)
CREAT SERPL-MCNC: 0.68 MG/DL — SIGNIFICANT CHANGE UP (ref 0.5–1.3)
CRP SERPL-MCNC: 15 MG/L — HIGH (ref 0–4)
EGFR: 91 ML/MIN/1.73M2 — SIGNIFICANT CHANGE UP
EOSINOPHIL # BLD AUTO: 0.32 K/UL — SIGNIFICANT CHANGE UP (ref 0–0.5)
EOSINOPHIL NFR BLD AUTO: 2.7 % — SIGNIFICANT CHANGE UP (ref 0–6)
ERYTHROCYTE [SEDIMENTATION RATE] IN BLOOD: 92 MM/HR — HIGH (ref 0–20)
GAS PNL BLDV: 138 MMOL/L — SIGNIFICANT CHANGE UP (ref 136–145)
GAS PNL BLDV: SIGNIFICANT CHANGE UP
GAS PNL BLDV: SIGNIFICANT CHANGE UP
GLUCOSE BLDV-MCNC: 102 MG/DL — HIGH (ref 70–99)
GLUCOSE SERPL-MCNC: 107 MG/DL — HIGH (ref 70–99)
HCO3 BLDV-SCNC: 34 MMOL/L — HIGH (ref 22–29)
HCT VFR BLD CALC: 38.4 % — SIGNIFICANT CHANGE UP (ref 34.5–45)
HCT VFR BLDA CALC: 40 % — SIGNIFICANT CHANGE UP (ref 34.5–46.5)
HGB BLD CALC-MCNC: 13.2 G/DL — SIGNIFICANT CHANGE UP (ref 11.7–16.1)
HGB BLD-MCNC: 11.8 G/DL — SIGNIFICANT CHANGE UP (ref 11.5–15.5)
IMM GRANULOCYTES NFR BLD AUTO: 0.5 % — SIGNIFICANT CHANGE UP (ref 0–0.9)
LACTATE BLDV-MCNC: 1.8 MMOL/L — SIGNIFICANT CHANGE UP (ref 0.5–2)
LYMPHOCYTES # BLD AUTO: 1.92 K/UL — SIGNIFICANT CHANGE UP (ref 1–3.3)
LYMPHOCYTES # BLD AUTO: 16.5 % — SIGNIFICANT CHANGE UP (ref 13–44)
MCHC RBC-ENTMCNC: 30.6 PG — SIGNIFICANT CHANGE UP (ref 27–34)
MCHC RBC-ENTMCNC: 30.7 GM/DL — LOW (ref 32–36)
MCV RBC AUTO: 99.5 FL — SIGNIFICANT CHANGE UP (ref 80–100)
MONOCYTES # BLD AUTO: 1.06 K/UL — HIGH (ref 0–0.9)
MONOCYTES NFR BLD AUTO: 9.1 % — SIGNIFICANT CHANGE UP (ref 2–14)
NEUTROPHILS # BLD AUTO: 8.24 K/UL — HIGH (ref 1.8–7.4)
NEUTROPHILS NFR BLD AUTO: 70.8 % — SIGNIFICANT CHANGE UP (ref 43–77)
NRBC # BLD: 0 /100 WBCS — SIGNIFICANT CHANGE UP (ref 0–0)
PCO2 BLDV: 61 MMHG — HIGH (ref 39–42)
PH BLDV: 7.36 — SIGNIFICANT CHANGE UP (ref 7.32–7.43)
PLATELET # BLD AUTO: 177 K/UL — SIGNIFICANT CHANGE UP (ref 150–400)
PO2 BLDV: 28 MMHG — SIGNIFICANT CHANGE UP (ref 25–45)
POTASSIUM BLDV-SCNC: 5.2 MMOL/L — HIGH (ref 3.5–5.1)
POTASSIUM SERPL-MCNC: 4.4 MMOL/L — SIGNIFICANT CHANGE UP (ref 3.5–5.3)
POTASSIUM SERPL-SCNC: 4.4 MMOL/L — SIGNIFICANT CHANGE UP (ref 3.5–5.3)
PROT SERPL-MCNC: 6.8 G/DL — SIGNIFICANT CHANGE UP (ref 6–8.3)
RBC # BLD: 3.86 M/UL — SIGNIFICANT CHANGE UP (ref 3.8–5.2)
RBC # FLD: 13.8 % — SIGNIFICANT CHANGE UP (ref 10.3–14.5)
SAO2 % BLDV: 40.6 % — LOW (ref 67–88)
SODIUM SERPL-SCNC: 141 MMOL/L — SIGNIFICANT CHANGE UP (ref 135–145)
WBC # BLD: 11.65 K/UL — HIGH (ref 3.8–10.5)
WBC # FLD AUTO: 11.65 K/UL — HIGH (ref 3.8–10.5)

## 2023-08-27 PROCEDURE — 99285 EMERGENCY DEPT VISIT HI MDM: CPT

## 2023-08-27 PROCEDURE — 99221 1ST HOSP IP/OBS SF/LOW 40: CPT | Mod: FS

## 2023-08-27 PROCEDURE — 99222 1ST HOSP IP/OBS MODERATE 55: CPT

## 2023-08-27 PROCEDURE — 73701 CT LOWER EXTREMITY W/DYE: CPT | Mod: 26,50,MA

## 2023-08-27 RX ORDER — HYDRALAZINE HCL 50 MG
75 TABLET ORAL
Refills: 0 | Status: DISCONTINUED | OUTPATIENT
Start: 2023-08-27 | End: 2023-09-01

## 2023-08-27 RX ORDER — LEVOTHYROXINE SODIUM 125 MCG
125 TABLET ORAL DAILY
Refills: 0 | Status: DISCONTINUED | OUTPATIENT
Start: 2023-08-27 | End: 2023-09-01

## 2023-08-27 RX ORDER — ACETAMINOPHEN 500 MG
650 TABLET ORAL EVERY 6 HOURS
Refills: 0 | Status: DISCONTINUED | OUTPATIENT
Start: 2023-08-27 | End: 2023-09-01

## 2023-08-27 RX ORDER — HYDRALAZINE HCL 50 MG
3 TABLET ORAL
Qty: 0 | Refills: 0 | DISCHARGE

## 2023-08-27 RX ORDER — TIOTROPIUM BROMIDE 18 UG/1
1 CAPSULE ORAL; RESPIRATORY (INHALATION)
Qty: 0 | Refills: 0 | DISCHARGE

## 2023-08-27 RX ORDER — CEFAZOLIN SODIUM 1 G
1000 VIAL (EA) INJECTION EVERY 8 HOURS
Refills: 0 | Status: DISCONTINUED | OUTPATIENT
Start: 2023-08-27 | End: 2023-08-27

## 2023-08-27 RX ORDER — MONTELUKAST 4 MG/1
10 TABLET, CHEWABLE ORAL DAILY
Refills: 0 | Status: DISCONTINUED | OUTPATIENT
Start: 2023-08-27 | End: 2023-09-01

## 2023-08-27 RX ORDER — CEFAZOLIN SODIUM 1 G
2000 VIAL (EA) INJECTION EVERY 8 HOURS
Refills: 0 | Status: DISCONTINUED | OUTPATIENT
Start: 2023-08-27 | End: 2023-08-27

## 2023-08-27 RX ORDER — ASPIRIN/CALCIUM CARB/MAGNESIUM 324 MG
1 TABLET ORAL
Refills: 0 | DISCHARGE

## 2023-08-27 RX ORDER — GABAPENTIN 400 MG/1
1 CAPSULE ORAL
Refills: 0 | DISCHARGE

## 2023-08-27 RX ORDER — NYSTATIN CREAM 100000 [USP'U]/G
1 CREAM TOPICAL ONCE
Refills: 0 | Status: COMPLETED | OUTPATIENT
Start: 2023-08-27 | End: 2023-08-27

## 2023-08-27 RX ORDER — LANOLIN ALCOHOL/MO/W.PET/CERES
3 CREAM (GRAM) TOPICAL AT BEDTIME
Refills: 0 | Status: DISCONTINUED | OUTPATIENT
Start: 2023-08-27 | End: 2023-09-01

## 2023-08-27 RX ORDER — PROPRANOLOL HCL 160 MG
1 CAPSULE, EXTENDED RELEASE 24HR ORAL
Refills: 0 | DISCHARGE

## 2023-08-27 RX ORDER — FUROSEMIDE 40 MG
40 TABLET ORAL DAILY
Refills: 0 | Status: DISCONTINUED | OUTPATIENT
Start: 2023-08-27 | End: 2023-09-01

## 2023-08-27 RX ORDER — TIOTROPIUM BROMIDE 18 UG/1
2 CAPSULE ORAL; RESPIRATORY (INHALATION) DAILY
Refills: 0 | Status: DISCONTINUED | OUTPATIENT
Start: 2023-08-27 | End: 2023-09-01

## 2023-08-27 RX ORDER — CEFAZOLIN SODIUM 1 G
VIAL (EA) INJECTION
Refills: 0 | Status: DISCONTINUED | OUTPATIENT
Start: 2023-08-27 | End: 2023-08-27

## 2023-08-27 RX ORDER — MONTELUKAST 4 MG/1
1 TABLET, CHEWABLE ORAL
Qty: 0 | Refills: 0 | DISCHARGE

## 2023-08-27 RX ORDER — ASPIRIN/CALCIUM CARB/MAGNESIUM 324 MG
81 TABLET ORAL DAILY
Refills: 0 | Status: DISCONTINUED | OUTPATIENT
Start: 2023-08-27 | End: 2023-09-01

## 2023-08-27 RX ORDER — CEFAZOLIN SODIUM 1 G
2000 VIAL (EA) INJECTION EVERY 8 HOURS
Refills: 0 | Status: DISCONTINUED | OUTPATIENT
Start: 2023-08-27 | End: 2023-08-31

## 2023-08-27 RX ORDER — LEVOTHYROXINE SODIUM 125 MCG
1 TABLET ORAL
Qty: 0 | Refills: 0 | DISCHARGE

## 2023-08-27 RX ORDER — GABAPENTIN 400 MG/1
300 CAPSULE ORAL
Refills: 0 | Status: DISCONTINUED | OUTPATIENT
Start: 2023-08-27 | End: 2023-09-01

## 2023-08-27 RX ORDER — LOSARTAN POTASSIUM 100 MG/1
100 TABLET, FILM COATED ORAL DAILY
Refills: 0 | Status: DISCONTINUED | OUTPATIENT
Start: 2023-08-27 | End: 2023-09-01

## 2023-08-27 RX ORDER — DULOXETINE HYDROCHLORIDE 30 MG/1
60 CAPSULE, DELAYED RELEASE ORAL
Refills: 0 | Status: DISCONTINUED | OUTPATIENT
Start: 2023-08-27 | End: 2023-09-01

## 2023-08-27 RX ORDER — CEFAZOLIN SODIUM 1 G
1000 VIAL (EA) INJECTION ONCE
Refills: 0 | Status: DISCONTINUED | OUTPATIENT
Start: 2023-08-27 | End: 2023-08-27

## 2023-08-27 RX ORDER — ATORVASTATIN CALCIUM 80 MG/1
40 TABLET, FILM COATED ORAL AT BEDTIME
Refills: 0 | Status: DISCONTINUED | OUTPATIENT
Start: 2023-08-27 | End: 2023-09-01

## 2023-08-27 RX ORDER — ENOXAPARIN SODIUM 100 MG/ML
40 INJECTION SUBCUTANEOUS EVERY 24 HOURS
Refills: 0 | Status: DISCONTINUED | OUTPATIENT
Start: 2023-08-27 | End: 2023-09-01

## 2023-08-27 RX ORDER — OLANZAPINE 15 MG/1
10 TABLET, FILM COATED ORAL AT BEDTIME
Refills: 0 | Status: DISCONTINUED | OUTPATIENT
Start: 2023-08-27 | End: 2023-09-01

## 2023-08-27 RX ORDER — AMLODIPINE BESYLATE 2.5 MG/1
5 TABLET ORAL DAILY
Refills: 0 | Status: DISCONTINUED | OUTPATIENT
Start: 2023-08-27 | End: 2023-09-01

## 2023-08-27 RX ORDER — FUROSEMIDE 40 MG
40 TABLET ORAL ONCE
Refills: 0 | Status: COMPLETED | OUTPATIENT
Start: 2023-08-27 | End: 2023-08-27

## 2023-08-27 RX ADMIN — NYSTATIN CREAM 1 APPLICATION(S): 100000 CREAM TOPICAL at 11:04

## 2023-08-27 RX ADMIN — Medication 40 MILLIGRAM(S): at 11:04

## 2023-08-27 RX ADMIN — Medication 100 MILLIGRAM(S): at 22:57

## 2023-08-27 RX ADMIN — OLANZAPINE 10 MILLIGRAM(S): 15 TABLET, FILM COATED ORAL at 22:57

## 2023-08-27 RX ADMIN — ATORVASTATIN CALCIUM 40 MILLIGRAM(S): 80 TABLET, FILM COATED ORAL at 22:57

## 2023-08-27 NOTE — H&P ADULT - NSHPLABSRESULTS_GEN_ALL_CORE
11.8   11.65 )-----------( 177      ( 27 Aug 2023 10:45 )             38.4       08-27    141  |  102  |  19  ----------------------------<  107<H>  4.4   |  30  |  0.68    Ca    10.0      27 Aug 2023 10:45    TPro  6.8  /  Alb  3.5  /  TBili  0.6  /  DBili  x   /  AST  13  /  ALT  11  /  AlkPhos  113  08-27              Urinalysis Basic - ( 27 Aug 2023 10:45 )    Color: x / Appearance: x / SG: x / pH: x  Gluc: 107 mg/dL / Ketone: x  / Bili: x / Urobili: x   Blood: x / Protein: x / Nitrite: x   Leuk Esterase: x / RBC: x / WBC x   Sq Epi: x / Non Sq Epi: x / Bacteria: x

## 2023-08-27 NOTE — H&P ADULT - NSHPREVIEWOFSYSTEMS_GEN_ALL_CORE
REVIEW OF SYSTEMS:    CONSTITUTIONAL: No weakness, + fevers or chills  EYES/ENT: No visual changes;  No vertigo or throat pain   NECK: No pain or stiffness  RESPIRATORY: No cough, wheezing, hemoptysis; No shortness of breath  CARDIOVASCULAR: No chest pain or palpitations  GASTROINTESTINAL: No abdominal or epigastric pain. No nausea, vomiting, or hematemesis; No diarrhea or constipation. No melena or hematochezia.  GENITOURINARY: No dysuria, frequency or hematuria  NEUROLOGICAL: No numbness or weakness  SKIN: + itching, burning,+ rashes on legs L>R    All other review of systems is negative unless indicated above.

## 2023-08-27 NOTE — ED PROVIDER NOTE - NS ED ROS FT
Constitutional: no fevers, chills  HEENT: no HA, vision changes, rhinorrhea, sore throat  Cardiac: no chest pain, palpitations  Respiratory: no SOB, cough or hemoptysis  GI: no n/v/d/c, abd pain, bloody or dark stools  : no dysuria, frequency, or hematuria  MSK: no joint pain, neck pain or back pain  Skin: see HPI  Neuro: no numbness/tingling, +baseline weakness  ROS otherwise negative except per HPI

## 2023-08-27 NOTE — CONSULT NOTE ADULT - SUBJECTIVE AND OBJECTIVE BOX
Patient is a 72y old  Female who presents with a chief complaint of   HPI:  72F with COPD on baseline 2 L nasal cannula, wheelchair dependent, Graves' disease, hypertension, hyperlipidemia prior breast/kidney cancer, recurrent admissions ( - ) for lower extremity swelling and erythema, treated with Cefazolin 2G q8 for Cellulitis, discharged on Keflex 1G q6, BCx negative MRSA swab negative, completed her PO keflex 2 days ago, presents () with worsening LE swelling and pain, found to be afebrile, WBC 11, CRP 15, ID consulted for assistance.    Patient --       prior hospital charts reviewed [  ]  primary team notes reviewed [  ]  other consultant notes reviewed [  ]    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      History of carpal tunnel release  right wrist      History of parathyroidectomy      S/P laminectomy  now bed and wheelchair ridden with severe pain          Allergies  Grapes (Hives)  Peaches (Hives)  No Known Drug Allergies  shellfish (Hives)    ANTIMICROBIALS (past 90 days)  MEDICATIONS  (STANDING):          MEDICATIONS  (STANDING):    SOCIAL HISTORY:       FAMILY HISTORY:  Family history of myocardial infarction  mother  at age 67 and father at age 67 of MI's    Family history of hypertension  mother and father    Family history of diabetes mellitus (Sibling)  siblings    Family history of heart disease (Sibling)  brother has a PPM    Family history of thyroid cancer (Child)  daughter has thyroid cancer      REVIEW OF SYSTEMS  [  ] ROS unobtainable because:    [  ] All other systems negative except as noted below:	    Constitutional:  [ ] fever [ ] chills  [ ] weight loss  [ ] weakness  Skin:  [ ] rash [ ] phlebitis	  Eyes: [ ] icterus [ ] pain  [ ] discharge	  ENMT: [ ] sore throat  [ ] thrush [ ] ulcers [ ] exudates  Respiratory: [ ] dyspnea [ ] hemoptysis [ ] cough [ ] sputum	  Cardiovascular:  [ ] chest pain [ ] palpitations [ ] edema	  Gastrointestinal:  [ ] nausea [ ] vomiting [ ] diarrhea [ ] constipation [ ] pain	  Genitourinary:  [ ] dysuria [ ] frequency [ ] hematuria [ ] discharge [ ] flank pain  [ ] incontinence  Musculoskeletal:  [ ] myalgias [ ] arthralgias [ ] arthritis  [ ] back pain  Neurological:  [ ] headache [ ] seizures  [ ] confusion/altered mental status  Psychiatric:  [ ] anxiety [ ] depression	  Hematology/Lymphatics:  [ ] lymphadenopathy  Endocrine:  [ ] adrenal [ ] thyroid  Allergic/Immunologic:	 [ ] transplant [ ] seasonal    Vital Signs Last 24 Hrs  T(F): 98 (23 @ 13:15), Max: 99.4 (23 @ 10:30)  Vital Signs Last 24 Hrs  HR: 79 (23 @ 13:15) (73 - 79)  BP: 117/78 (23 @ 13:15) (117/78 - 129/80)  RR: 17 (23 @ 13:15)  SpO2: 98% (23 @ 13:15) (95% - 98%)  Wt(kg): --    PHYSICAL EXAM:                              11.8   11.65 )-----------( 177      ( 27 Aug 2023 10:45 )             38.4       141  |  102  |  19  ----------------------------<  107<H>  4.4   |  30  |  0.68    Ca    10.0      27 Aug 2023 10:45    TPro  6.8  /  Alb  3.5  /  TBili  0.6  /  DBili  x   /  AST  13  /  ALT  11  /  AlkPhos  113      Urinalysis Basic - ( 27 Aug 2023 10:45 )    Color: x / Appearance: x / SG: x / pH: x  Gluc: 107 mg/dL / Ketone: x  / Bili: x / Urobili: x   Blood: x / Protein: x / Nitrite: x   Leuk Esterase: x / RBC: x / WBC x   Sq Epi: x / Non Sq Epi: x / Bacteria: x     Patient is a 72y old  Female who presents with a chief complaint of   HPI:  72F with Lymphedema, COPD on baseline 2 L nasal cannula, wheelchair dependent, Graves' disease, hypertension, hyperlipidemia prior breast/kidney cancer, recurrent admissions ( - ) for lower extremity swelling and erythema, treated with Cefazolin 2G q8 for Cellulitis, discharged on Keflex 1G q6, BCx negative MRSA swab negative, completed her PO keflex 2 days ago, presents () with worsening LE swelling and pain, found to be afebrile, WBC 11, CRP 15, ID consulted for assistance.    Patient --       prior hospital charts reviewed [  ]  primary team notes reviewed [  ]  other consultant notes reviewed [  ]    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      History of carpal tunnel release  right wrist      History of parathyroidectomy      S/P laminectomy  now bed and wheelchair ridden with severe pain          Allergies  Grapes (Hives)  Peaches (Hives)  No Known Drug Allergies  shellfish (Hives)    ANTIMICROBIALS (past 90 days)  MEDICATIONS  (STANDING):          MEDICATIONS  (STANDING):    SOCIAL HISTORY:       FAMILY HISTORY:  Family history of myocardial infarction  mother  at age 67 and father at age 67 of MI's    Family history of hypertension  mother and father    Family history of diabetes mellitus (Sibling)  siblings    Family history of heart disease (Sibling)  brother has a PPM    Family history of thyroid cancer (Child)  daughter has thyroid cancer      REVIEW OF SYSTEMS  [  ] ROS unobtainable because:    [  ] All other systems negative except as noted below:	    Constitutional:  [ ] fever [ ] chills  [ ] weight loss  [ ] weakness  Skin:  [ ] rash [ ] phlebitis	  Eyes: [ ] icterus [ ] pain  [ ] discharge	  ENMT: [ ] sore throat  [ ] thrush [ ] ulcers [ ] exudates  Respiratory: [ ] dyspnea [ ] hemoptysis [ ] cough [ ] sputum	  Cardiovascular:  [ ] chest pain [ ] palpitations [ ] edema	  Gastrointestinal:  [ ] nausea [ ] vomiting [ ] diarrhea [ ] constipation [ ] pain	  Genitourinary:  [ ] dysuria [ ] frequency [ ] hematuria [ ] discharge [ ] flank pain  [ ] incontinence  Musculoskeletal:  [ ] myalgias [ ] arthralgias [ ] arthritis  [ ] back pain  Neurological:  [ ] headache [ ] seizures  [ ] confusion/altered mental status  Psychiatric:  [ ] anxiety [ ] depression	  Hematology/Lymphatics:  [ ] lymphadenopathy  Endocrine:  [ ] adrenal [ ] thyroid  Allergic/Immunologic:	 [ ] transplant [ ] seasonal    Vital Signs Last 24 Hrs  T(F): 98 (23 @ 13:15), Max: 99.4 (23 @ 10:30)  Vital Signs Last 24 Hrs  HR: 79 (23 @ 13:15) (73 - 79)  BP: 117/78 (23 @ 13:15) (117/78 - 129/80)  RR: 17 (23 @ 13:15)  SpO2: 98% (23 @ 13:15) (95% - 98%)  Wt(kg): --    PHYSICAL EXAM:                              11.8   11.65 )-----------( 177      ( 27 Aug 2023 10:45 )             38.4       141  |  102  |  19  ----------------------------<  107<H>  4.4   |  30  |  0.68    Ca    10.0      27 Aug 2023 10:45    TPro  6.8  /  Alb  3.5  /  TBili  0.6  /  DBili  x   /  AST  13  /  ALT  11  /  AlkPhos  113      Urinalysis Basic - ( 27 Aug 2023 10:45 )    Color: x / Appearance: x / SG: x / pH: x  Gluc: 107 mg/dL / Ketone: x  / Bili: x / Urobili: x   Blood: x / Protein: x / Nitrite: x   Leuk Esterase: x / RBC: x / WBC x   Sq Epi: x / Non Sq Epi: x / Bacteria: x     Patient is a 72y old  Female who presents with a chief complaint of   HPI:  72F with Lymphedema, COPD on baseline 2 L nasal cannula, wheelchair dependent, Graves' disease, hypertension, hyperlipidemia prior breast/kidney cancer, recurrent admissions ( - ) for lower extremity swelling and erythema, treated with Cefazolin 2G q8 for Cellulitis, discharged on Keflex 1G q6, BCx negative MRSA swab negative, had followup with PMD who extended her Keflex to additional 2 weeks, and had completed her PO keflex 2 days ago, presents () with worsening LE swelling and pain, found to be afebrile, WBC 11, CRP 15, ID consulted for assistance.    Still has some pain but stable  Denies fever or chills  Denies recent trauma or bug bites  Reports non compliant to diuretics       prior hospital charts reviewed [ x ]  primary team notes reviewed [ x ]  other consultant notes reviewed [x  ]    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      History of carpal tunnel release  right wrist      History of parathyroidectomy      S/P laminectomy  now bed and wheelchair ridden with severe pain          Allergies  Grapes (Hives)  Peaches (Hives)  No Known Drug Allergies  shellfish (Hives)    ANTIMICROBIALS (past 90 days)  MEDICATIONS  (STANDING):          MEDICATIONS  (STANDING):    SOCIAL HISTORY:   Denies alcohol, tobacco, recreational drug use      FAMILY HISTORY:  Family history of myocardial infarction  mother  at age 67 and father at age 67 of MI's    Family history of hypertension  mother and father    Family history of diabetes mellitus (Sibling)  siblings    Family history of heart disease (Sibling)  brother has a PPM    Family history of thyroid cancer (Child)  daughter has thyroid cancer      REVIEW OF SYSTEMS  [  ] ROS unobtainable because:    [ x ] All other systems negative except as noted below:	    Constitutional:  [ ] fever [ ] chills  [ ] weight loss  [ ] weakness  Skin:  [ ] rash [ ] phlebitis	  Eyes: [ ] icterus [ ] pain  [ ] discharge	  ENMT: [ ] sore throat  [ ] thrush [ ] ulcers [ ] exudates  Respiratory: [ ] dyspnea [ ] hemoptysis [ ] cough [ ] sputum	  Cardiovascular:  [ ] chest pain [ ] palpitations [ ] edema	  Gastrointestinal:  [ ] nausea [ ] vomiting [ ] diarrhea [ ] constipation [ ] pain	  Genitourinary:  [ ] dysuria [ ] frequency [ ] hematuria [ ] discharge [ ] flank pain  [ ] incontinence  Musculoskeletal:  [ ] myalgias [ ] arthralgias [ ] arthritis  [ ] back pain  Neurological:  [ ] headache [ ] seizures  [ ] confusion/altered mental status  Psychiatric:  [ ] anxiety [ ] depression	  Hematology/Lymphatics:  [ ] lymphadenopathy  Endocrine:  [ ] adrenal [ ] thyroid  Allergic/Immunologic:	 [ ] transplant [ ] seasonal    Vital Signs Last 24 Hrs  T(F): 98 (23 @ 13:15), Max: 99.4 (23 @ 10:30)  Vital Signs Last 24 Hrs  HR: 79 (23 @ 13:15) (73 - 79)  BP: 117/78 (23 @ 13:15) (117/78 - 129/80)  RR: 17 (23 @ 13:15)  SpO2: 98% (23 @ 13:15) (95% - 98%)  Wt(kg): --    Physical Exam:  Constitutional:  unkempt, obese, comfortable  Head/Eyes: no icterus  ENT:  supple, no cervical lymphadenopathy   LUNGS:  CTA  CVS:  regular rhythm, no murmur  Abd:  soft, non-tender; non-distended  Ext:  +bilateral LE swelling, erythema L>R, TTP L > R  Vascular:  IV site no erythema tenderness or discharge  MSK:  joints without swelling  Neuro: AAO X 3, non- focal                                11.8   11.65 )-----------( 177      ( 27 Aug 2023 10:45 )             38.4       141  |  102  |  19  ----------------------------<  107<H>  4.4   |  30  |  0.68    Ca    10.0      27 Aug 2023 10:45    TPro  6.8  /  Alb  3.5  /  TBili  0.6  /  DBili  x   /  AST  13  /  ALT  11  /  AlkPhos  113  08-27    Urinalysis Basic - ( 27 Aug 2023 10:45 )    Color: x / Appearance: x / SG: x / pH: x  Gluc: 107 mg/dL / Ketone: x  / Bili: x / Urobili: x   Blood: x / Protein: x / Nitrite: x   Leuk Esterase: x / RBC: x / WBC x   Sq Epi: x / Non Sq Epi: x / Bacteria: x

## 2023-08-27 NOTE — ED ADULT NURSE NOTE - NSFALLRISKINTERV_ED_ALL_ED

## 2023-08-27 NOTE — ED PROVIDER NOTE - ATTENDING CONTRIBUTION TO CARE
Old records reviewed, patient with multiple admissions for similar symptoms, per discharge summary from August 1, 2023, patient was admitted from July 25 to August 1 with bilateral leg swelling and erythema worse on the left, has a history of COPD on 2 L, is wheelchair dependent, has Graves' disease, hypertension, hyperlipidemia, prior breast and kidney cancer, multiple admissions as stated earlier for left lower extremity swelling with concerns for cellulitis, patient also evaluated by infectious disease, thought to be cellulitis, minimal response to antibiotics.    Patient presents from home, history from patient, patient's  and aide is also at bedside, patient recently finished her antibiotics 2 to 3 days ago, patient is not compliant with diuretics, patient with leg elevations each night, being wrapped at home, yet despite these measures patient with worsening left lower leg redness, swelling, worsening with time, denies any fevers, chest pain, shortness of breath.    Physical exam: Patient nontoxic, no respiratory distress, initially anxious but improved throughout interaction, patient with resting tremor throughout which also improved throughout interaction, patient initially tachycardic which also resolved throughout interaction, clear lungs, abdomen soft nontender, has bilateral lower extremity swelling with lymphedema and erythema worse on the left, with warmth and tenderness, no blistering, no open lesions, DP pulses intact bilaterally.  Patient also with left fungal rash with well-circumscribed border within the abdominal wall crease on the left side.    MDM: Patient with recurrent cellulitis left lower extremity with lymphedema, candidiasis fungal rash likely of the abdominal wall crease region, peripheral edema, benign essential tremor with underlying anxiety, no signs of sepsis, no signs of pulmonary edema clinically with the absence of JVD and no respiratory symptoms.  Given patient's multiple hospitalizations for similar complaints, would consider alternative diagnosis to a bacterial cellulitis, would also consider alternative diagnoses including fungal cellulitis, venous stasis dermatitis, or resistant bacterial infection.  Concern also over patient noncompliance with diuretics, patient admits to embarrassment when taking the diuretics and her urinating, patient adamantly refusing using a catheter, however patient may be a good candidate for outpatient Prima fit use, would also consider dermatology consult with consideration for punch biopsy to evaluate for the cause of the patient cellulitis.  Reviewed all records, patient with multiple ultrasounds with limited results but overall with negative DVT, would consider advanced imaging, i.e. a CT venogram to evaluate for DVT, and to admit obtain advanced imaging of the soft tissues in the bone of the leg to rule out occult osteomyelitis, doubt necrotizing fasciitis given exam but CT scan may provide further analysis for this as well.  Will obtain labs including inflammatory markers, CBC, chemistries, blood cultures, will hold off on antibiotics until consideration by dermatology infectious disease and to consider a punch biopsy before starting antibiotics, would give topical antifungals for candidiasis of the abdominal wall, patient will require inpatient care as if patient does have a bacterial or fungal cellulitis, will benefit from IV antibiotics given the poor bioavailability for both antibiotics and antifungals.

## 2023-08-27 NOTE — ED ADULT NURSE REASSESSMENT NOTE - NS ED NURSE REASSESS COMMENT FT1
Pt soiled in urine on return from CT, pt cleaned, linens and gown changed, pt attached to new purewick

## 2023-08-27 NOTE — CONSULT NOTE ADULT - ASSESSMENT
ASSESSMENT AND PLAN:  Cellulitis on L leg superimposed on stasis dermatitis and lymphedema  -	Start abx per primary, apprec ID assistance in management  -	Bacterial swab of L leg sent  -	Acetic acid wet dressings 1 cup of white vinegar in 1 gallon of water, Vanicream over the area and use washcloth soaked in this solution and put on area for 30 min - 1hr   -	When not using wet dressings, should always have compression wraps  -	Recommend obtaining lymphedema pump for patient to use at home    The patient's chart was reviewed in addition to being seen and examined at bedside with the dermatology attending Dr. Paredes.  Recommendations were communicated with the primary team.    Please page [phone] with a 10-digit call-back number for further related questions.  Please re-consult Dermatology for any new or worsening developments.    Shawn Card MD  Resident Physician, PGY-2  [facility] Dermatology  Pager: [phone]  Office: [phone] ASSESSMENT AND PLAN:  Cellulitis on L leg superimposed on stasis dermatitis and lymphedema  -	Start abx per primary, apprec ID assistance in management  -	Bacterial swab of L leg sent  -	Acetic acid wet dressings 1 cup of white vinegar in 1 gallon of water, Vanicream over the area and use washcloth soaked in this solution and put on area for 30 min - 1hr   -	When not using wet dressings, should always have compression wraps  -	Recommend obtaining lymphedema pump for patient to use at home  - will follow peripherally    The patient's chart was reviewed in addition to being seen and examined at bedside with the dermatology attending Dr. Paredes.  Recommendations were communicated with the primary team.    Please page [phone] with a 10-digit call-back number for further related questions.  Please re-consult Dermatology for any new or worsening developments.    Shawn Card MD  Resident Physician, PGY-2  [facility] Dermatology  Pager: [phone]  Office: [phone]

## 2023-08-27 NOTE — ED ADULT NURSE NOTE - OBJECTIVE STATEMENT
Patient is a 72 year old female brought in by EMS from home due to swelling of lower extremities. Patient reports recently discharged on August 1, 2023 after a course of Ancef for cellulitis of the lower extremities then did an outpatient  course of Keflex but the lower extremity swelling pain has gotten worse. On assessment A&Ox4, PERRL, sensory grossly intact, able to move all extremities well, clear speech, breathing comfortably on 2L NC (baseline), no accessory muscle use, no cough, NSR on the cardiac monitor, no JVD, bilateral lower extremities/ankle/feet edema noted with erythema and ill-defined borders worse on the left, abdomen is soft, nondistended, nontender, groin noted to be erythema. Patient denies headache, dizziness, chest pain, palpitations, cough, SOB, abdominal pain, n/v/d, urinary symptoms, fevers, chills at this time. PMH COPD on baseline 2 L nasal cannula, wheelchair dependent, Graves' disease, hypertension, hyperlipidemia prior breast/kidney cancer, recurrent admissions for lower extremity swelling and erythema.

## 2023-08-27 NOTE — ED PROVIDER NOTE - PHYSICAL EXAMINATION
General: non-toxic, NAD  HEENT: NCAT, PERRL, oropharyngeal AW patent, no conjunctival pallor.  Nasal cannula in place  Cardiac: RRR, no murmurs.  Resp: CTAB, normal rate  Abdomen: soft, non-distended, bowel sounds present, no ttp, no rebound or guarding. no organomegaly  Extremities: Significant bilateral lower extremity edema and erythema with ill-defined borders worse on the left   skin: no blisters no involvement of the palms or soles  Neuro: AAOx4, 5+motor, sensation grossly intact  Psych: mood and affect appropriate

## 2023-08-27 NOTE — ED ADULT NURSE NOTE - CAS TRG GENERAL NORM CIRC DET
Discharge/Transition Planning      2274: Spoke with RN Tameka Salazar and Dr Tsering Segura. Tameka aSlazar will complete walk test again and see if o2 still needed.      1200: CM notified pt on room at 91 % and no longer needs home o2      Pan Hammonds RN BSN  Outcomes Manager    Pager # [phone] Strong peripheral pulses

## 2023-08-27 NOTE — ED PROVIDER NOTE - CLINICAL SUMMARY MEDICAL DECISION MAKING FREE TEXT BOX
72-year-old female with extensive past medical history, multiple admissions for bilateral chronic lower extremity refractory to multiple antibiotic courses now presents with recurrence of symptoms after completing her outpatient course of keflex. on chart review blood cultures always negative, recent inflammatory markers and procalcitonin not significantly elevated. pt without diabetes or systemic symptoms. running sx lymphedema with overlying venous stasis v cellulitis. pt poorly mobile at baseline. will consult dermatology for punch biopsy and hold off antibiotics until specimen obtained. lasix for poor med compliance and decrease fluid burden. pt also noted to have intertrigo which we will treat with topical nystatin and keep area dry with primafit.

## 2023-08-27 NOTE — H&P ADULT - NSHPSOCIALHISTORY_GEN_ALL_CORE
Social History:    Marital Status:  ( x  )    (   ) Single    (   )    (  )   Occupation:   Lives with: (  ) alone  (  ) children   (x  ) spouse   (  ) parents  (  ) other    Substance Use (street drugs): ( x ) never used  (  ) other:  Tobacco Usage:  ( x  ) never smoked   (   ) former smoker   (   ) current smoker  (     ) pack years  (        ) last cigarette date  Alcohol Usage: no     (     ) Advanced Directives: (     ) None    (      ) DNR    (     ) DNI    (     ) Health Care Proxy:

## 2023-08-27 NOTE — CONSULT NOTE ADULT - ASSESSMENT
WORK UP:      ANTIBIOTIC:      DIAGNOSIS and IMPRESSION:        RECOMMENDATIONS:        Above recommendations are Preliminary until Attending's Addendum which includes Final Recommendations      Rolly Garcia DO, PGY-5   ID fellow  [employer] Preferred  After 5pm/weekends call [phone]   72F with Lymphedema, COPD on baseline 2 L nasal cannula, wheelchair dependent, Graves' disease, hypertension, hyperlipidemia prior breast/kidney cancer, recurrent admissions (7/25 - 8/1) for lower extremity swelling and erythema, treated with Cefazolin 2G q8 for Cellulitis, discharged on Keflex 1G q6, BCx negative MRSA swab negative, had followup with PMD who extended her Keflex to additional 2 weeks, and had completed her PO keflex 2 days ago, presents (8/27) with worsening LE swelling and pain, found to be afebrile, WBC 11, CRP 15, ID consulted for assistance.    Still has some pain but stable  Denies fever or chills  Denies recent trauma or bug bites  Reports non compliant to diuretics     DIAGNOSIS and IMPRESSION:  #LLE Cellulitis  #BLE Lymphedema     RECOMMENDATIONS:  - start Cefazolin 2G q8  - monitor temperature curve  - trend WBC  - obtain Procal  - pending CT LE  - follow up BCx x2  - Appreciate Derm evaluation    Case d/w attending and primary team        Rolly Garcia DO, PGY-5   ID fellow  [employer] Teams Preferred  After 5pm/weekends call [phone]

## 2023-08-27 NOTE — H&P ADULT - ASSESSMENT
72 f with    Cellulitis legs  - blood cultures  - antibiotic  - CT legs   - ID evaluation     Dermatitis  - dermatology evaluation     Acute on Chronic Systolic Congestive Heart Failure  - diurese  - cardiology evaluation Dr. Salcedo    Dysphagia/ Esophageal spasms  - CCB for esophageal spasms    Anxiety / Depression   - control  - Ativan 0.5 mg bid prn    Breast cancer in situ, left   - stable. Follow as OTP with Oncology    COPD (chronic obstructive pulmonary disease)   - nebs    Graves disease/  Hypothyroid   - continue supplementation  - follow TSH    Hyperlipidemia   - stable    Hypertension   - control    Kidney cancer, primary, with metastasis from kidney to other site, left LEft sided- s/p nephrectomy only- no chemo or RT  - stable    Obesity   - Nutrition education    DVT prophylaxis  - AC     Further action as per clinical course     d/w patient     Nathan Mora MD phone [phone]

## 2023-08-27 NOTE — CONSULT NOTE ADULT - ATTENDING COMMENTS
72F with Lymphedema, COPD on baseline 2 L nasal cannula, wheelchair dependent, Graves' disease, hypertension, hyperlipidemia prior breast/kidney cancer, recurrent admissions (7/25 - 8/1) for lower extremity swelling and erythema, treated with Cefazolin 2G q8 for Cellulitis, discharged on Keflex 1G q6, BCx negative MRSA swab negative, had followup with PMD who extended her Keflex to additional 2 weeks, and had completed her PO keflex 2 days ago, admitted 8/27/23 with worsening LE swelling   afebrile, WBC 11, CRP 15,     Ms To has been not taking diuretic regularly - Her mobility is limited and she finds the requirement to get up to void difficult and uncomfortable  On exam, there is bilateral venous stasis changes, however LLE is more indurated and tender  Still has some pain but stable  Denies fever or chills  Denies recent trauma or bug bites  Reports non compliant to diuretics     DIAGNOSIS and IMPRESSION:  #LLE Cellulitis  #BLE Lymphedema     RECOMMENDATIONS:  - start Cefazolin 2G q8  - obtain Procal  - pending CT LE  - follow up BCx x2

## 2023-08-27 NOTE — ED PROVIDER NOTE - OBJECTIVE STATEMENT
72-year-old female history of COPD on baseline 2 L nasal cannula, wheelchair dependent, Graves' disease, hypertension, hyperlipidemia prior breast/kidney cancer, recurrent admissions for lower extremity swelling and erythema, most recently discharged on August 1, 2023 after a course of Ancef and negative blood cultures.  Patient finished her outpatient course of Keflex 2 days ago now comes in with worsening lower extremity swelling pain.  Rashes not itchy not associated with fevers chills nausea vomiting chest pain or shortness of breath outside of her baseline.

## 2023-08-27 NOTE — H&P ADULT - NSHPPHYSICALEXAM_GEN_ALL_CORE
PHYSICAL EXAMINATION:  Vital Signs Last 24 Hrs  T(C): 36.3 (27 Aug 2023 17:44), Max: 37.4 (27 Aug 2023 10:30)  T(F): 97.4 (27 Aug 2023 17:44), Max: 99.4 (27 Aug 2023 10:30)  HR: 65 (27 Aug 2023 17:44) (65 - 79)  BP: 145/65 (27 Aug 2023 17:44) (117/78 - 145/65)  BP(mean): --  RR: 18 (27 Aug 2023 17:44) (17 - 20)  SpO2: 97% (27 Aug 2023 17:44) (95% - 98%)    Parameters below as of 27 Aug 2023 17:44  Patient On (Oxygen Delivery Method): nasal cannula  O2 Flow (L/min): 2    CAPILLARY BLOOD GLUCOSE          GENERAL: NAD  HEAD:  atraumatic, normocephalic  EYES: sclera anicteric  ENMT: mucous membranes moist  NECK: supple, No JVD  CHEST/LUNG: clear to auscultation bilaterally; no rales, rhonchi, or wheezing b/l  HEART: normal S1, S2  ABDOMEN: BS+, soft, ND, NT   EXTREMITIES:  3+ bipedal edema , erythema L>R  NEURO: awake, alert, interactive; moves all extremities  SKIN: legs rashes

## 2023-08-27 NOTE — ED PROVIDER NOTE - PROGRESS NOTE DETAILS
PRESTON Alvarez PGY3 dermatology resident aware. sent images of rash to nela zaldivar on teams per request. PRESTON Alvarez PGY3 left a message with Dr. Oppenheim answering service (ID) PRESTON Alvarez PGY3 no answer from page to Dr. Mora. called answering service and left a message

## 2023-08-27 NOTE — ED PROVIDER NOTE - NSICDXPASTMEDICALHX_GEN_ALL_CORE_FT
[DrMarco  ___] : Dr. KRISHNAN PAST MEDICAL HISTORY:  Anxiety     Asthma     Breast cancer in situ, left     COPD (chronic obstructive pulmonary disease)     DVT (deep venous thrombosis) history of- not presently on A/C    Graves disease     Hyperlipidemia     Hypertension     Hypothyroid     Kidney cancer, primary, with metastasis from kidney to other site, left LEft sided- s/p nephrectomy only- no chemo or RT    Obesity     Sleep apnea

## 2023-08-27 NOTE — PATIENT PROFILE ADULT - FALL HARM RISK - HARM RISK INTERVENTIONS

## 2023-08-27 NOTE — CONSULT NOTE ADULT - SUBJECTIVE AND OBJECTIVE BOX
HPI: 72F with Lymphedema, COPD on baseline 2 L nasal cannula, wheelchair dependent, Graves' disease, hypertension, hyperlipidemia prior breast/kidney cancer, recurrent admissions ( - ) for lower extremity swelling and erythema, treated with Cefazolin 2G q8 for Cellulitis, discharged on Keflex 1G q6, BCx negative MRSA swab negative, completed her PO keflex 2 days ago, presents () with worsening LE swelling and pain, found to be afebrile, WBC 11, CRP 15    dermhx: rash started getting worse 2 days after her course of Keflex, warm. She has venous stasis and lymphedema. Wears compression dressings at home. No fevers/chills. Not itchy. Is a bit painful. Derm consult for recurrent cellulitis of BLE.    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      History of carpal tunnel release  right wrist      History of parathyroidectomy      S/P laminectomy  now bed and wheelchair ridden with severe pain    REVIEW OF SYSTEMS:  pertinent ROS negative except for HPI    MEDICATIONS  (STANDING):    MEDICATIONS  (PRN):      Allergies    Grapes (Hives)  Peaches (Hives)  No Known Drug Allergies  shellfish (Hives)    Intolerances        SOCIAL HISTORY:     hx smoking  non-smoker    FAMILY HISTORY:  Family history of myocardial infarction  mother  at age 67 and father at age 67 of MI's    Family history of hypertension  mother and father    Family history of diabetes mellitus (Sibling)  siblings    Family history of heart disease (Sibling)  brother has a PPM    Family history of thyroid cancer (Child)  daughter has thyroid cancer        Vital Signs Last 24 Hrs  T(C): 36.7 (27 Aug 2023 13:15), Max: 37.4 (27 Aug 2023 10:30)  T(F): 98 (27 Aug 2023 13:15), Max: 99.4 (27 Aug 2023 10:30)  HR: 79 (27 Aug 2023 13:15) (73 - 79)  BP: 117/78 (27 Aug 2023 13:15) (117/78 - 129/80)  BP(mean): --  RR: 17 (27 Aug 2023 13:15) (17 - 20)  SpO2: 98% (27 Aug 2023 13:15) (95% - 98%)    Parameters below as of 27 Aug 2023 13:15  Patient On (Oxygen Delivery Method): nasal cannula  O2 Flow (L/min): 2      PHYSICAL EXAM:  The patient was AOx3, well nourished, and in NAD.  OP showed no ulcerations.  There was no visible LAD.  Conjunctiva were non-injected.  There was extensive edema of extremities  The scalp, hair, face, eyebrows, lips, OP, neck, chest, back, extremities x4, and nails were examined.    Of note on skin exam: : red patch on BLE, L > R, several fluid filled blisters on L anterior shin    LABS:                        11.8   11.65 )-----------( 177      ( 27 Aug 2023 10:45 )             38.4         141  |  102  |  19  ----------------------------<  107<H>  4.4   |  30  |  0.68    Ca    10.0      27 Aug 2023 10:45    TPro  6.8  /  Alb  3.5  /  TBili  0.6  /  DBili  x   /  AST  13  /  ALT  11  /  AlkPhos  113        Urinalysis Basic - ( 27 Aug 2023 10:45 )    Color: x / Appearance: x / SG: x / pH: x  Gluc: 107 mg/dL / Ketone: x  / Bili: x / Urobili: x   Blood: x / Protein: x / Nitrite: x   Leuk Esterase: x / RBC: x / WBC x   Sq Epi: x / Non Sq Epi: x / Bacteria: x        RADIOLOGY & ADDITIONAL STUDIES: reviewed; no pertinent findings

## 2023-08-28 ENCOUNTER — APPOINTMENT (OUTPATIENT)
Dept: GASTROENTEROLOGY | Facility: CLINIC | Age: 73
End: 2023-08-28

## 2023-08-28 LAB
ANION GAP SERPL CALC-SCNC: 14 MMOL/L — SIGNIFICANT CHANGE UP (ref 5–17)
BUN SERPL-MCNC: 15 MG/DL — SIGNIFICANT CHANGE UP (ref 7–23)
CALCIUM SERPL-MCNC: 9.9 MG/DL — SIGNIFICANT CHANGE UP (ref 8.4–10.5)
CHLORIDE SERPL-SCNC: 99 MMOL/L — SIGNIFICANT CHANGE UP (ref 96–108)
CO2 SERPL-SCNC: 27 MMOL/L — SIGNIFICANT CHANGE UP (ref 22–31)
CREAT SERPL-MCNC: 0.64 MG/DL — SIGNIFICANT CHANGE UP (ref 0.5–1.3)
CRP SERPL-MCNC: 24 MG/L — HIGH (ref 0–4)
EGFR: 93 ML/MIN/1.73M2 — SIGNIFICANT CHANGE UP
GLUCOSE SERPL-MCNC: 132 MG/DL — HIGH (ref 70–99)
GRAM STN FLD: SIGNIFICANT CHANGE UP
HCT VFR BLD CALC: 36.2 % — SIGNIFICANT CHANGE UP (ref 34.5–45)
HGB BLD-MCNC: 11.4 G/DL — LOW (ref 11.5–15.5)
MCHC RBC-ENTMCNC: 30.7 PG — SIGNIFICANT CHANGE UP (ref 27–34)
MCHC RBC-ENTMCNC: 31.5 GM/DL — LOW (ref 32–36)
MCV RBC AUTO: 97.6 FL — SIGNIFICANT CHANGE UP (ref 80–100)
NRBC # BLD: 0 /100 WBCS — SIGNIFICANT CHANGE UP (ref 0–0)
PLATELET # BLD AUTO: 164 K/UL — SIGNIFICANT CHANGE UP (ref 150–400)
POTASSIUM SERPL-MCNC: 3.8 MMOL/L — SIGNIFICANT CHANGE UP (ref 3.5–5.3)
POTASSIUM SERPL-SCNC: 3.8 MMOL/L — SIGNIFICANT CHANGE UP (ref 3.5–5.3)
PROCALCITONIN SERPL-MCNC: 0.07 NG/ML — SIGNIFICANT CHANGE UP (ref 0.02–0.1)
RBC # BLD: 3.71 M/UL — LOW (ref 3.8–5.2)
RBC # FLD: 13.7 % — SIGNIFICANT CHANGE UP (ref 10.3–14.5)
SODIUM SERPL-SCNC: 140 MMOL/L — SIGNIFICANT CHANGE UP (ref 135–145)
SPECIMEN SOURCE: SIGNIFICANT CHANGE UP
TSH SERPL-MCNC: 2.59 UIU/ML — SIGNIFICANT CHANGE UP (ref 0.27–4.2)
WBC # BLD: 9.37 K/UL — SIGNIFICANT CHANGE UP (ref 3.8–10.5)
WBC # FLD AUTO: 9.37 K/UL — SIGNIFICANT CHANGE UP (ref 3.8–10.5)

## 2023-08-28 PROCEDURE — 99232 SBSQ HOSP IP/OBS MODERATE 35: CPT

## 2023-08-28 RX ORDER — CHLORHEXIDINE GLUCONATE 213 G/1000ML
1 SOLUTION TOPICAL DAILY
Refills: 0 | Status: DISCONTINUED | OUTPATIENT
Start: 2023-08-28 | End: 2023-09-01

## 2023-08-28 RX ADMIN — Medication 75 MILLIGRAM(S): at 05:26

## 2023-08-28 RX ADMIN — ATORVASTATIN CALCIUM 40 MILLIGRAM(S): 80 TABLET, FILM COATED ORAL at 21:50

## 2023-08-28 RX ADMIN — DULOXETINE HYDROCHLORIDE 60 MILLIGRAM(S): 30 CAPSULE, DELAYED RELEASE ORAL at 05:25

## 2023-08-28 RX ADMIN — GABAPENTIN 300 MILLIGRAM(S): 400 CAPSULE ORAL at 05:26

## 2023-08-28 RX ADMIN — CHLORHEXIDINE GLUCONATE 1 APPLICATION(S): 213 SOLUTION TOPICAL at 12:45

## 2023-08-28 RX ADMIN — Medication 10 MILLIGRAM(S): at 05:25

## 2023-08-28 RX ADMIN — Medication 125 MICROGRAM(S): at 05:26

## 2023-08-28 RX ADMIN — ENOXAPARIN SODIUM 40 MILLIGRAM(S): 100 INJECTION SUBCUTANEOUS at 05:24

## 2023-08-28 RX ADMIN — Medication 100 MILLIGRAM(S): at 21:51

## 2023-08-28 RX ADMIN — DULOXETINE HYDROCHLORIDE 60 MILLIGRAM(S): 30 CAPSULE, DELAYED RELEASE ORAL at 17:27

## 2023-08-28 RX ADMIN — Medication 75 MILLIGRAM(S): at 17:25

## 2023-08-28 RX ADMIN — TIOTROPIUM BROMIDE 2 PUFF(S): 18 CAPSULE ORAL; RESPIRATORY (INHALATION) at 12:41

## 2023-08-28 RX ADMIN — Medication 10 MILLIGRAM(S): at 17:25

## 2023-08-28 RX ADMIN — Medication 5 MILLIGRAM(S): at 05:25

## 2023-08-28 RX ADMIN — Medication 100 MILLIGRAM(S): at 14:15

## 2023-08-28 RX ADMIN — OLANZAPINE 10 MILLIGRAM(S): 15 TABLET, FILM COATED ORAL at 21:48

## 2023-08-28 RX ADMIN — AMLODIPINE BESYLATE 5 MILLIGRAM(S): 2.5 TABLET ORAL at 05:26

## 2023-08-28 RX ADMIN — Medication 40 MILLIGRAM(S): at 05:25

## 2023-08-28 RX ADMIN — MONTELUKAST 10 MILLIGRAM(S): 4 TABLET, CHEWABLE ORAL at 12:40

## 2023-08-28 RX ADMIN — GABAPENTIN 300 MILLIGRAM(S): 400 CAPSULE ORAL at 17:26

## 2023-08-28 RX ADMIN — LOSARTAN POTASSIUM 100 MILLIGRAM(S): 100 TABLET, FILM COATED ORAL at 05:25

## 2023-08-28 RX ADMIN — Medication 100 MILLIGRAM(S): at 05:24

## 2023-08-28 RX ADMIN — Medication 81 MILLIGRAM(S): at 12:40

## 2023-08-28 NOTE — CONSULT NOTE ADULT - SUBJECTIVE AND OBJECTIVE BOX
CHIEF COMPLAINT: edema    HISTORY OF PRESENT ILLNESS:  72-year-old female history of COPD on baseline 2 L nasal cannula, wheelchair dependent, Graves' disease, hypertension, hyperlipidemia prior breast/kidney cancer, recurrent admissions for lower extremity swelling and erythema, most recently discharged on 2023 after a course of Ancef and negative blood cultures.  Patient finished her outpatient course of Keflex 2 days ago now comes in with worsening lower extremity swelling pain.  Rashes not itchy not associated with fevers chills nausea vomiting chest pain or shortness of breath outside of her baseline.      Allergies    Grapes (Hives)  Peaches (Hives)  No Known Drug Allergies  shellfish (Hives)    Intolerances    	    MEDICATIONS:  amLODIPine   Tablet 5 milliGRAM(s) Oral daily  aspirin enteric coated 81 milliGRAM(s) Oral daily  enoxaparin Injectable 40 milliGRAM(s) SubCutaneous every 24 hours  furosemide    Tablet 40 milliGRAM(s) Oral daily  hydrALAZINE 75 milliGRAM(s) Oral two times a day  losartan 100 milliGRAM(s) Oral daily  propranolol 20 milliGRAM(s) Oral two times a day    ceFAZolin   IVPB 2000 milliGRAM(s) IV Intermittent every 8 hours    montelukast 10 milliGRAM(s) Oral daily  tiotropium 2.5 MICROgram(s) Inhaler 2 Puff(s) Inhalation daily    acetaminophen     Tablet .. 650 milliGRAM(s) Oral every 6 hours PRN  busPIRone 10 milliGRAM(s) Oral two times a day  DULoxetine 60 milliGRAM(s) Oral two times a day  gabapentin 300 milliGRAM(s) Oral two times a day  melatonin 3 milliGRAM(s) Oral at bedtime PRN  OLANZapine 10 milliGRAM(s) Oral at bedtime      atorvastatin 40 milliGRAM(s) Oral at bedtime  levothyroxine 125 MICROGram(s) Oral daily  predniSONE   Tablet 5 milliGRAM(s) Oral daily    chlorhexidine 2% Cloths 1 Application(s) Topical daily      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      History of carpal tunnel release  right wrist      History of parathyroidectomy      S/P laminectomy  now bed and wheelchair ridden with severe pain          FAMILY HISTORY:  Family history of myocardial infarction  mother  at age 67 and father at age 67 of MI's    Family history of hypertension  mother and father    Family history of diabetes mellitus (Sibling)  siblings    Family history of heart disease (Sibling)  brother has a PPM    Family history of thyroid cancer (Child)  daughter has thyroid cancer        SOCIAL HISTORY:    non smoker. wheelchair bound      REVIEW OF SYSTEMS:  See HPI, otherwise complete 10 point review of systems negative    [ ] All others negative	      PHYSICAL EXAM:  T(C): 36.4 (23 @ 00:17), Max: 37.4 (23 @ 10:30)  HR: 93 (23 @ 00:17) (65 - 93)  BP: 110/83 (23 @ 00:17) (110/83 - 145/65)  RR: 18 (23 @ 00:17) (17 - 20)  SpO2: 95% (23 @ 00:17) (95% - 98%)  Wt(kg): --  I&O's Summary    27 Aug 2023 07:01  -  28 Aug 2023 07:00  --------------------------------------------------------  IN: 0 mL / OUT: 0 mL / NET: 0 mL        Appearance: No Acute Distress	  HEENT:  Normal oral mucosa, PERRL, EOMI	  Cardiovascular: Normal S1 S2, No JVD, No murmurs/rubs/gallops  Respiratory: Lungs clear to auscultation bilaterally  Gastrointestinal:  Soft, Non-tender, + BS	  Skin: No rashes, No ecchymoses, No cyanosis	  Neurologic: Non-focal  Extremities: No clubbing, cyanosis. + edema  Vascular: Peripheral pulses palpable 2+ bilaterally  Psychiatry: A & O x 3, Mood & affect appropriate    Laboratory Data:	 	    CBC Full  -  ( 28 Aug 2023 07:03 )  WBC Count : 9.37 K/uL  Hemoglobin : 11.4 g/dL  Hematocrit : 36.2 %  Platelet Count - Automated : 164 K/uL  Mean Cell Volume : 97.6 fl  Mean Cell Hemoglobin : 30.7 pg  Mean Cell Hemoglobin Concentration : 31.5 gm/dL  Auto Neutrophil # : x  Auto Lymphocyte # : x  Auto Monocyte # : x  Auto Eosinophil # : x  Auto Basophil # : x  Auto Neutrophil % : x  Auto Lymphocyte % : x  Auto Monocyte % : x  Auto Eosinophil % : x  Auto Basophil % : x        141  |  102  |  19  ----------------------------<  107<H>  4.4   |  30  |  0.68    Ca    10.0      27 Aug 2023 10:45    TPro  6.8  /  Alb  3.5  /  TBili  0.6  /  DBili  x   /  AST  13  /  ALT  11  /  AlkPhos  113        proBNP:   Lipid Profile:   HgA1c:   TSH:       CARDIAC MARKERS:            Interpretation of Telemetry: 	    ECG:  	  RADIOLOGY:  OTHER: 	    PREVIOUS DIAGNOSTIC TESTING:    [ ] Echocardiogram:  [ ] Catheterization:  [ ] Stress Test:  	    Assessment:  72-year-old female history of COPD on baseline 2 L nasal cannula, wheelchair dependent, Graves' disease, hypertension, hyperlipidemia prior breast/kidney cancer, recurrent admissions for lower extremity swelling and erythema, most recently discharged on 2023 after a course of Ancef and negative blood cultures.      Recs:  cardiac stable  no e/o chf   recent TTE mild diastolic dysfunction, normal lv/rv and valves  cw lasix 40mg po qd  rec continuous compression socks and ace wraps and leg elevation  recent le giovanni duplex neg for dvt  f/u ID and derm recs. abx per id  lymphedema pump    Greater than 60 minutes spent on total encounter; more than 50% of the visit was spent counseling and/or coordinating care by the attending physician.   	  Juan Salcedo MD   Cardiovascular Diseases  [phone]

## 2023-08-28 NOTE — PHYSICAL THERAPY INITIAL EVALUATION ADULT - TRANSFER TRAINING, PT EVAL
GOAL: Patient will perform sit to stand transfers with prior level of assist with proper hand placement and sequencing in 2 weeks.

## 2023-08-28 NOTE — PHYSICAL THERAPY INITIAL EVALUATION ADULT - ADDITIONAL COMMENTS
Pt states that she lives in a pvt apartment with her  and son +no steps to enter. Pt reports that she has 2 aides that assist with ADLs for a total of 7 days a week from 8am-5:30 pm. Pt was nonambulatory prior to admission and mostly wheelchair bound as she would squat pivot for transfers with assist of 2. Pt states that she lives in a pvt apartment with her  and son +no steps to enter. Pt reports that she has 2 aides that assist with ADLs for a total of 7 days a week from 8am-5:30 pm. Pt was nonambulatory prior to admission and mostly wheelchair bound as she would squat pivot for transfers with assist of 1 using rolling walker

## 2023-08-28 NOTE — PHYSICAL THERAPY INITIAL EVALUATION ADULT - IMPAIRMENTS FOUND, PT EVAL
gait, locomotion, and balance/muscle strength aerobic capacity/endurance/gait, locomotion, and balance/muscle strength

## 2023-08-28 NOTE — PROGRESS NOTE BEHAVIORAL HEALTH - NS ED BHA AXIS I SECONDARY1 CODE FT
Keystone Flap Text: The defect edges were debeveled with a #15 scalpel blade. Given the location of the defect, shape of the defect a keystone flap was deemed most appropriate. Using a sterile surgical marker, an appropriate keystone flap was drawn incorporating the defect, outlining the appropriate donor tissue and placing the expected incisions within the relaxed skin tension lines where possible. The area thus outlined was incised deep to adipose tissue with a #15 scalpel blade. The skin margins were undermined to an appropriate distance in all directions around the primary defect and laterally outward around the flap utilizing iris scissors. Following this, the designed flap was carried into the primary defect and sutured into place. F24
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
## 2023-08-29 PROCEDURE — 99221 1ST HOSP IP/OBS SF/LOW 40: CPT

## 2023-08-29 RX ADMIN — ENOXAPARIN SODIUM 40 MILLIGRAM(S): 100 INJECTION SUBCUTANEOUS at 05:26

## 2023-08-29 RX ADMIN — TIOTROPIUM BROMIDE 2 PUFF(S): 18 CAPSULE ORAL; RESPIRATORY (INHALATION) at 12:26

## 2023-08-29 RX ADMIN — Medication 81 MILLIGRAM(S): at 12:25

## 2023-08-29 RX ADMIN — MONTELUKAST 10 MILLIGRAM(S): 4 TABLET, CHEWABLE ORAL at 12:25

## 2023-08-29 RX ADMIN — Medication 10 MILLIGRAM(S): at 17:41

## 2023-08-29 RX ADMIN — GABAPENTIN 300 MILLIGRAM(S): 400 CAPSULE ORAL at 17:42

## 2023-08-29 RX ADMIN — Medication 3 MILLIGRAM(S): at 21:14

## 2023-08-29 RX ADMIN — ATORVASTATIN CALCIUM 40 MILLIGRAM(S): 80 TABLET, FILM COATED ORAL at 21:08

## 2023-08-29 RX ADMIN — Medication 75 MILLIGRAM(S): at 05:26

## 2023-08-29 RX ADMIN — GABAPENTIN 300 MILLIGRAM(S): 400 CAPSULE ORAL at 05:25

## 2023-08-29 RX ADMIN — Medication 100 MILLIGRAM(S): at 05:34

## 2023-08-29 RX ADMIN — AMLODIPINE BESYLATE 5 MILLIGRAM(S): 2.5 TABLET ORAL at 05:27

## 2023-08-29 RX ADMIN — Medication 100 MILLIGRAM(S): at 21:09

## 2023-08-29 RX ADMIN — Medication 5 MILLIGRAM(S): at 05:26

## 2023-08-29 RX ADMIN — Medication 40 MILLIGRAM(S): at 05:25

## 2023-08-29 RX ADMIN — DULOXETINE HYDROCHLORIDE 60 MILLIGRAM(S): 30 CAPSULE, DELAYED RELEASE ORAL at 05:24

## 2023-08-29 RX ADMIN — Medication 100 MILLIGRAM(S): at 14:32

## 2023-08-29 RX ADMIN — Medication 125 MICROGRAM(S): at 05:26

## 2023-08-29 RX ADMIN — Medication 75 MILLIGRAM(S): at 17:42

## 2023-08-29 RX ADMIN — DULOXETINE HYDROCHLORIDE 60 MILLIGRAM(S): 30 CAPSULE, DELAYED RELEASE ORAL at 17:41

## 2023-08-29 RX ADMIN — Medication 10 MILLIGRAM(S): at 05:25

## 2023-08-29 RX ADMIN — CHLORHEXIDINE GLUCONATE 1 APPLICATION(S): 213 SOLUTION TOPICAL at 12:29

## 2023-08-29 RX ADMIN — OLANZAPINE 10 MILLIGRAM(S): 15 TABLET, FILM COATED ORAL at 21:08

## 2023-08-29 RX ADMIN — LOSARTAN POTASSIUM 100 MILLIGRAM(S): 100 TABLET, FILM COATED ORAL at 05:25

## 2023-08-29 NOTE — CONSULT NOTE ADULT - SUBJECTIVE AND OBJECTIVE BOX
Wound SURGERY CONSULT NOTE    HPI:  72-year-old female history of COPD on baseline 2 L nasal cannula, wheelchair dependent, Graves' disease, hypertension, hyperlipidemia prior breast/kidney cancer, recurrent admissions for lower extremity swelling and erythema, most recently discharged on 2023 after a course of Ancef and negative blood cultures.  Patient finished her outpatient course of Keflex 2 days ago now comes in with worsening lower extremity swelling pain.  Rashes not itchy not associated with fevers chills nausea vomiting chest pain or shortness of breath outside of her baseline. (27 Aug 2023 19:44)      Wound consult requested by team to assist w/ management of  skin injury.   Pt w/o c/o pain, drainage, odor,. Offloading and pericare initiated upon admission as pt Increasingly sedentary 2/2 to illness. Pt is Incontinent of urine & stool. (+) purewick.   No h/o falls, trauma.  Appetite good.    Current Diet: Diet, Regular (23 @ 19:57)      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      History of carpal tunnel release  right wrist      History of parathyroidectomy      S/P laminectomy  now bed and wheelchair ridden with severe pain      REVIEW OF SYSTEMS:   General/ Breast/ Skin/Vasc/ Neuro/ MSK: see HPI  All other systems negative    MEDICATIONS  (STANDING):  amLODIPine   Tablet 5 milliGRAM(s) Oral daily  aspirin enteric coated 81 milliGRAM(s) Oral daily  atorvastatin 40 milliGRAM(s) Oral at bedtime  busPIRone 10 milliGRAM(s) Oral two times a day  ceFAZolin   IVPB 2000 milliGRAM(s) IV Intermittent every 8 hours  chlorhexidine 2% Cloths 1 Application(s) Topical daily  DULoxetine 60 milliGRAM(s) Oral two times a day  enoxaparin Injectable 40 milliGRAM(s) SubCutaneous every 24 hours  furosemide    Tablet 40 milliGRAM(s) Oral daily  gabapentin 300 milliGRAM(s) Oral two times a day  hydrALAZINE 75 milliGRAM(s) Oral two times a day  levothyroxine 125 MICROGram(s) Oral daily  losartan 100 milliGRAM(s) Oral daily  montelukast 10 milliGRAM(s) Oral daily  OLANZapine 10 milliGRAM(s) Oral at bedtime  predniSONE   Tablet 5 milliGRAM(s) Oral daily  propranolol 20 milliGRAM(s) Oral two times a day  tiotropium 2.5 MICROgram(s) Inhaler 2 Puff(s) Inhalation daily    MEDICATIONS  (PRN):  acetaminophen     Tablet .. 650 milliGRAM(s) Oral every 6 hours PRN Temp greater or equal to 38.5C (101.3F), Mild Pain (1 - 3)  melatonin 3 milliGRAM(s) Oral at bedtime PRN Insomnia      Allergies    Grapes (Hives)  Peaches (Hives)  No Known Drug Allergies  shellfish (Hives)    Intolerances        SOCIAL HISTORY:  ; bedbound, Denies smoking, ETOH, drugs    FAMILY HISTORY:  Family history of myocardial infarction  mother  at age 67 and father at age 67 of MI's    Family history of hypertension  mother and father    Family history of diabetes mellitus (Sibling)  siblings    Family history of heart disease (Sibling)  brother has a PPM    Family history of thyroid cancer (Child)  daughter has thyroid cancer      PHYSICAL EXAM:  Vital Signs Last 24 Hrs  T(C): 36.5 (29 Aug 2023 10:11), Max: 37.4 (28 Aug 2023 23:20)  T(F): 97.7 (29 Aug 2023 10:11), Max: 99.3 (28 Aug 2023 23:20)  HR: 71 (29 Aug 2023 10:11) (64 - 76)  BP: 132/70 (29 Aug 2023 10:11) (118/56 - 143/72)  BP(mean): --  RR: 18 (29 Aug 2023 10:11) (17 - 18)  SpO2: 96% (29 Aug 2023 10:11) (94% - 96%)    Parameters below as of 29 Aug 2023 10:11  Patient On (Oxygen Delivery Method): nasal cannula  O2 Flow (L/min): 2      NAD,  A&Ox3/ Obese  Versa Care P500      HEENT:  Sclera clear, mucosa moist, throat clear, trachea midline, neck supple  Respiratory: nonlabored w/ equal chest rise  Gastrointestinal: soft NT/ND   : (+) purewick  Neurology:  verbal,  follows commands  Psych: calm/ appropriate  Musculoskeletal: no deformities/ contractures  Vascular: BLLE equally warm/ no cyanosis, clubbing,  nor acute ischemia: (+) lymphedema                BLLE DP pulses palpable    Skin:  thin, dry, pale,  ecchymosis w/o hematoma        Blanchable hyperpigmented purple skin bilateral buttocks and posterior thighs: no open wounds no friction and shear/no dermal lifting         No blistering, no  warmth, tenderness, induration, fluctuance, nor crepitus    LABS/ CULTURES/ RADIOLOGY:                        11.4   9.37  )-----------( 164      ( 28 Aug 2023 07:03 )             36.2       140  |  99  |  15  ----------------------------<  132      [23 @ 07:01]  3.8   |  27  |  0.64        Ca     9.9     [23 @ 07:01]

## 2023-08-29 NOTE — CONSULT NOTE ADULT - ASSESSMENT
A/P:72-year-old female history of COPD on baseline 2 L nasal cannula, wheelchair dependent, Graves' disease, hypertension, hyperlipidemia prior breast/kidney cancer, recurrent admissions for lower extremity swelling and erythema,     Wound Consult requested to assist w/ management of  Chronic tissue injury bilateral buttocks/sacral region/posterior thighs  Recommendation   CTI- cavilon/charo/turn and reposition  Lymphedema: Ace on in AM off in PM  -   Continue turning and positioning w/ offloading assistive devices as per protocol  -   Buttocks/ Sacrum Charo/ BID /cavilon and prn soiling   -   Continue w/ attends under pads and Pericare w/ purewick care as per protocol  -   Waffle Cushion to chair when oob to chair  -   Continue w/ low air loss pressure redistribution bed surface   Moisturize intact skin w/ SWEEN cream BID  BLLE Lymphedema Ace on in am off in pm  Consider PAVEL/PVR,  Nutrition Consult for optimization in pt w/ Increased nutritional needs  Care as per medicine, will follow w/ you and remain available as requested  Upon discharge f/u as outpatient at [facility] [address] [place] [address] [phone]  Seen &  D/w team & RN  Thank you for this consult  Tisha TIMMONS-BC, [facility] [phone]  Nights/ Weekends/ Holidays please call:  General Surgery Consult pager ([phone]) for emergencies  Wound PT for multilayer leg wrapping or VAC issues (x [phone])

## 2023-08-30 LAB
-  AMPICILLIN: SIGNIFICANT CHANGE UP
-  TETRACYCLINE: SIGNIFICANT CHANGE UP
-  VANCOMYCIN: SIGNIFICANT CHANGE UP
GLUCOSE BLDC GLUCOMTR-MCNC: 117 MG/DL — HIGH (ref 70–99)
METHOD TYPE: SIGNIFICANT CHANGE UP

## 2023-08-30 PROCEDURE — 93971 EXTREMITY STUDY: CPT | Mod: 26,LT

## 2023-08-30 PROCEDURE — 93010 ELECTROCARDIOGRAM REPORT: CPT

## 2023-08-30 PROCEDURE — 99232 SBSQ HOSP IP/OBS MODERATE 35: CPT

## 2023-08-30 RX ADMIN — Medication 10 MILLIGRAM(S): at 05:02

## 2023-08-30 RX ADMIN — GABAPENTIN 300 MILLIGRAM(S): 400 CAPSULE ORAL at 05:02

## 2023-08-30 RX ADMIN — CHLORHEXIDINE GLUCONATE 1 APPLICATION(S): 213 SOLUTION TOPICAL at 11:56

## 2023-08-30 RX ADMIN — Medication 40 MILLIGRAM(S): at 05:02

## 2023-08-30 RX ADMIN — Medication 125 MICROGRAM(S): at 05:01

## 2023-08-30 RX ADMIN — GABAPENTIN 300 MILLIGRAM(S): 400 CAPSULE ORAL at 17:16

## 2023-08-30 RX ADMIN — Medication 100 MILLIGRAM(S): at 21:10

## 2023-08-30 RX ADMIN — ENOXAPARIN SODIUM 40 MILLIGRAM(S): 100 INJECTION SUBCUTANEOUS at 05:01

## 2023-08-30 RX ADMIN — ATORVASTATIN CALCIUM 40 MILLIGRAM(S): 80 TABLET, FILM COATED ORAL at 21:10

## 2023-08-30 RX ADMIN — LOSARTAN POTASSIUM 100 MILLIGRAM(S): 100 TABLET, FILM COATED ORAL at 05:03

## 2023-08-30 RX ADMIN — AMLODIPINE BESYLATE 5 MILLIGRAM(S): 2.5 TABLET ORAL at 05:02

## 2023-08-30 RX ADMIN — Medication 81 MILLIGRAM(S): at 11:53

## 2023-08-30 RX ADMIN — Medication 10 MILLIGRAM(S): at 17:16

## 2023-08-30 RX ADMIN — DULOXETINE HYDROCHLORIDE 60 MILLIGRAM(S): 30 CAPSULE, DELAYED RELEASE ORAL at 05:02

## 2023-08-30 RX ADMIN — Medication 75 MILLIGRAM(S): at 05:02

## 2023-08-30 RX ADMIN — Medication 100 MILLIGRAM(S): at 13:18

## 2023-08-30 RX ADMIN — TIOTROPIUM BROMIDE 2 PUFF(S): 18 CAPSULE ORAL; RESPIRATORY (INHALATION) at 11:51

## 2023-08-30 RX ADMIN — Medication 75 MILLIGRAM(S): at 17:15

## 2023-08-30 RX ADMIN — DULOXETINE HYDROCHLORIDE 60 MILLIGRAM(S): 30 CAPSULE, DELAYED RELEASE ORAL at 17:17

## 2023-08-30 RX ADMIN — OLANZAPINE 10 MILLIGRAM(S): 15 TABLET, FILM COATED ORAL at 21:10

## 2023-08-30 RX ADMIN — MONTELUKAST 10 MILLIGRAM(S): 4 TABLET, CHEWABLE ORAL at 11:52

## 2023-08-30 RX ADMIN — Medication 100 MILLIGRAM(S): at 05:03

## 2023-08-30 RX ADMIN — Medication 5 MILLIGRAM(S): at 05:02

## 2023-08-31 ENCOUNTER — TRANSCRIPTION ENCOUNTER (OUTPATIENT)
Age: 73
End: 2023-08-31

## 2023-08-31 PROCEDURE — 99231 SBSQ HOSP IP/OBS SF/LOW 25: CPT

## 2023-08-31 RX ORDER — CEPHALEXIN 500 MG
1000 CAPSULE ORAL EVERY 8 HOURS
Refills: 0 | Status: DISCONTINUED | OUTPATIENT
Start: 2023-08-31 | End: 2023-09-01

## 2023-08-31 RX ADMIN — Medication 1000 MILLIGRAM(S): at 14:49

## 2023-08-31 RX ADMIN — DULOXETINE HYDROCHLORIDE 60 MILLIGRAM(S): 30 CAPSULE, DELAYED RELEASE ORAL at 05:13

## 2023-08-31 RX ADMIN — ATORVASTATIN CALCIUM 40 MILLIGRAM(S): 80 TABLET, FILM COATED ORAL at 21:07

## 2023-08-31 RX ADMIN — CHLORHEXIDINE GLUCONATE 1 APPLICATION(S): 213 SOLUTION TOPICAL at 11:09

## 2023-08-31 RX ADMIN — Medication 10 MILLIGRAM(S): at 05:13

## 2023-08-31 RX ADMIN — AMLODIPINE BESYLATE 5 MILLIGRAM(S): 2.5 TABLET ORAL at 05:12

## 2023-08-31 RX ADMIN — Medication 125 MICROGRAM(S): at 05:13

## 2023-08-31 RX ADMIN — GABAPENTIN 300 MILLIGRAM(S): 400 CAPSULE ORAL at 05:12

## 2023-08-31 RX ADMIN — GABAPENTIN 300 MILLIGRAM(S): 400 CAPSULE ORAL at 17:49

## 2023-08-31 RX ADMIN — Medication 75 MILLIGRAM(S): at 05:13

## 2023-08-31 RX ADMIN — ENOXAPARIN SODIUM 40 MILLIGRAM(S): 100 INJECTION SUBCUTANEOUS at 05:12

## 2023-08-31 RX ADMIN — TIOTROPIUM BROMIDE 2 PUFF(S): 18 CAPSULE ORAL; RESPIRATORY (INHALATION) at 11:07

## 2023-08-31 RX ADMIN — Medication 75 MILLIGRAM(S): at 17:52

## 2023-08-31 RX ADMIN — Medication 1000 MILLIGRAM(S): at 21:06

## 2023-08-31 RX ADMIN — MONTELUKAST 10 MILLIGRAM(S): 4 TABLET, CHEWABLE ORAL at 11:09

## 2023-08-31 RX ADMIN — Medication 100 MILLIGRAM(S): at 05:14

## 2023-08-31 RX ADMIN — LOSARTAN POTASSIUM 100 MILLIGRAM(S): 100 TABLET, FILM COATED ORAL at 05:13

## 2023-08-31 RX ADMIN — Medication 81 MILLIGRAM(S): at 11:09

## 2023-08-31 RX ADMIN — DULOXETINE HYDROCHLORIDE 60 MILLIGRAM(S): 30 CAPSULE, DELAYED RELEASE ORAL at 17:50

## 2023-08-31 RX ADMIN — OLANZAPINE 10 MILLIGRAM(S): 15 TABLET, FILM COATED ORAL at 21:07

## 2023-08-31 RX ADMIN — Medication 10 MILLIGRAM(S): at 17:50

## 2023-08-31 RX ADMIN — Medication 40 MILLIGRAM(S): at 05:13

## 2023-08-31 RX ADMIN — Medication 5 MILLIGRAM(S): at 05:13

## 2023-08-31 RX ADMIN — Medication 3 MILLIGRAM(S): at 21:10

## 2023-08-31 NOTE — DISCHARGE NOTE PROVIDER - NSDCMRMEDTOKEN_GEN_ALL_CORE_FT
amLODIPine 5 mg oral tablet: 1 tab(s) orally once a day  aspirin 81 mg oral delayed release tablet: 1 tab(s) orally once a day  atorvastatin 40 mg oral tablet: 1 tab(s) orally once a day (at bedtime)  busPIRone 10 mg oral tablet: 2 tab(s) orally once a day at noon  DULoxetine 60 mg oral delayed release capsule: 1 cap(s) orally 2 times a day  furosemide 40 mg oral tablet: 1 tab(s) orally once a day  gabapentin 300 mg oral capsule: 1 cap(s) orally 2 times a day (noon &amp; evening)  Home Physical Therapy: Weakness and deconditioning  hydrALAZINE 25 mg oral tablet: 3 tab(s) orally 2 times a day  levothyroxine 125 mcg (0.125 mg) oral tablet: 1 tab(s) orally once a day  losartan 100 mg oral tablet: 1 tab(s) orally once a day  melatonin 3 mg oral tablet: 1 tab(s) orally once a day (at bedtime)  montelukast 10 mg oral tablet: 1 tab(s) orally once a day  OLANZapine 10 mg oral tablet: 1 tab(s) orally once a day (at bedtime)  penicillin V potassium 500 mg oral tablet: 1 tab(s) orally 2 times a day for 6 months  predniSONE 5 mg oral tablet: 1 tab(s) orally once a day  propranolol 20 mg oral tablet: 1 tab(s) orally 2 times a day  Spiriva HandiHaler 18 mcg inhalation capsule: 1 cap(s) inhaled once a day   amLODIPine 5 mg oral tablet: 1 tab(s) orally once a day  aspirin 81 mg oral delayed release tablet: 1 tab(s) orally once a day  atorvastatin 40 mg oral tablet: 1 tab(s) orally once a day (at bedtime)  busPIRone 10 mg oral tablet: 2 tab(s) orally once a day at noon  cephalexin 500 mg oral capsule: 2 cap(s) orally every 8 hours  DULoxetine 60 mg oral delayed release capsule: 1 cap(s) orally 2 times a day  furosemide 40 mg oral tablet: 1 tab(s) orally once a day  gabapentin 300 mg oral capsule: 1 cap(s) orally 2 times a day (noon &amp; evening)  hydrALAZINE 25 mg oral tablet: 3 tab(s) orally 2 times a day  levothyroxine 125 mcg (0.125 mg) oral tablet: 1 tab(s) orally once a day  losartan 100 mg oral tablet: 1 tab(s) orally once a day  melatonin 3 mg oral tablet: 1 tab(s) orally once a day (at bedtime)  montelukast 10 mg oral tablet: 1 tab(s) orally once a day  OLANZapine 10 mg oral tablet: 1 tab(s) orally once a day (at bedtime)  penicillin V potassium 500 mg oral tablet: 1 tab(s) orally 2 times a day for 6 months  predniSONE 5 mg oral tablet: 1 tab(s) orally once a day  propranolol 20 mg oral tablet: 1 tab(s) orally 2 times a day  Spiriva HandiHaler 18 mcg inhalation capsule: 1 cap(s) inhaled once a day

## 2023-08-31 NOTE — DISCHARGE NOTE PROVIDER - PROVIDER TOKENS
PROVIDER:[TOKEN:[[id number]],FOLLOWUP:[1 month]],PROVIDER:[TOKEN:[[id number]],FOLLOWUP:[1-3 days]]

## 2023-08-31 NOTE — DISCHARGE NOTE PROVIDER - HOSPITAL COURSE
72F with Lymphedema, COPD on baseline 2 L nasal cannula, wheelchair dependent, Graves' disease, hypertension, hyperlipidemia prior breast/kidney cancer, recurrent admissions (7/25 - 8/1) for lower extremity swelling and erythema, treated with Cefazolin 2G q8 for Cellulitis, discharged on Keflex 1G q6, BCx negative MRSA swab negative, had followup with PMD who extended her Keflex to additional 2 weeks, and had completed her PO keflex 2 days ago, admitted 8/27/23 with worsening LE swelling   afebrile, WBC 11, CRP 15, Ms To has been not taking diuretic regularly - Her mobility is limited and she finds the requirement to get up to void difficult and uncomfortable  Reports non compliant to diuretics , patient diagnosed with LLE cellulitis and BLE Lymphedema ; she was seen by infectious disease and recommended STOP Cefazolin 2G q8 8/27 -->   Change to PO Keflex 1000 mg po TID 8/30 -->9/3THEN change to Chronic prophylaxis PCN  mg po twice daily x 6 months  9/4-->  continue elevation LE, compression, skin care ;rationale of long term prophylaxis reviewed with pt. Patient was seen by wound team and recommended Wound Consult requested to assist w/ management of Chronic tissue injury bilateral buttocks/sacral region/posterior thighs; they recommeded cavilon/charo/turn and reposition  Lymphedema: Ace on in AM off in PM  Continue turning and positioning w/ offloading assistive devices as per protocol Buttocks/ Sacrum Charo/ BID /cavilon and prn soiling   -   Continue w/ attends under pads and Pericare w/ purewick care as per protocol  -   Waffle Cushion to chair when oob to chair  -   Continue w/ low air loss pressure redistribution bed surface   Moisturize intact skin w/ SWEEN cream BID  BLLE Lymphedema Ace on in am off in pm  Consider PAVEL/PVR,Upon discharge f/u as outpatient at [facility] [address] [place] [address] [phone]   72F with Lymphedema, COPD on baseline 2 L nasal cannula, wheelchair dependent, Graves' disease, hypertension, hyperlipidemia prior breast/kidney cancer, recurrent admissions (7/25 - 8/1) for lower extremity swelling and erythema, treated with Cefazolin 2G q8 for Cellulitis, discharged on Keflex 1G q6, BCx negative MRSA swab negative, had followup with PMD who extended her Keflex to additional 2 weeks, and had completed her PO keflex 2 days ago, admitted 8/27/23 with worsening LE swelling   afebrile, WBC 11, CRP 15, Ms To has been not taking diuretic regularly - Her mobility is limited and she finds the requirement to get up to void difficult and uncomfortable  Reports non compliant to diuretics , patient diagnosed with LLE cellulitis and BLE Lymphedema ; she was seen by infectious disease and recommended STOP Cefazolin 2G q8 8/27 -->   Change to PO Keflex 1000 mg po TID 8/30 -->9/3THEN change to Chronic prophylaxis PCN  mg po twice daily x 6 months  9/4-->  continue elevation LE, compression, skin care ;rationale of long term prophylaxis reviewed with pt. Patient was seen by wound team and recommended Wound Consult requested to assist w/ management of Chronic tissue injury bilateral buttocks/sacral region/posterior thighs; they recommeded cavilon/charo/turn and reposition  Lymphedema: Ace on in AM off in PM  Continue turning and positioning w/ offloading assistive devices as per protocol Buttocks/ Sacrum Charo/ BID /cavilon and prn soiling   -   Continue w/ attends under pads and Pericare w/ purewick care as per protocol  -   Waffle Cushion to chair when oob to chair  -   Continue w/ low air loss pressure redistribution bed surface   Moisturize intact skin w/ SWEEN cream BID  BLLE Lymphedema Ace on in am off in pm  Consider PAVEL/PVR,Upon discharge f/u as outpatient at [facility] [address] [place] [address] [phone]    Dr. Ho has medically has medically cleared pt for discharge home with follow-up as advised.

## 2023-08-31 NOTE — DISCHARGE NOTE PROVIDER - NSDCHHHOMEBOUNDOTHER_GEN_ALL_CORE_FT
Chronic tissue injury bilateral buttocks/sacral region/posterior thighs  Recommendation   CTI- cavilon/daria/turn and reposition  Lymphedema: Ace on in AM off in PM

## 2023-08-31 NOTE — DISCHARGE NOTE PROVIDER - NSDCCPCAREPLAN_GEN_ALL_CORE_FT
PRINCIPAL DISCHARGE DIAGNOSIS  Diagnosis: Lymphedema  Assessment and Plan of Treatment: Chronic tissue injury bilateral buttocks/sacral region/posterior thighs  Recommendation   CTI- cavilon/daria/turn and reposition  Lymphedema: Ace on in AM off in PM        SECONDARY DISCHARGE DIAGNOSES  Diagnosis: Hypertension  Assessment and Plan of Treatment: Follow up with your medical doctor to establish long term blood pressure treatment goals.      Diagnosis: Hyperlipemia  Assessment and Plan of Treatment: c/w lipitor    Diagnosis: Cellulitis  Assessment and Plan of Treatment: #LLE Cellulitis  #BLE Lymphedema   RECOMMENDATIONS:  STOP Cefazolin 2G q8 8/27 -->    Change to PO Keflex 1000 mg po TID 8/30 -->9/3  THEN change to Chronic prophylaxis PCN  mg po twice daily x 6 months  9/4-->  continue elevation LE, compression, skin care  Please follow up with Dr. Ralph Duron outpatient .       PRINCIPAL DISCHARGE DIAGNOSIS  Diagnosis: Lymphedema  Assessment and Plan of Treatment: Chronic tissue injury bilateral buttocks/sacral region/posterior thighs  Recommendation   CTI- cavilon/daria/turn and reposition  Lymphedema: Ace on in AM off in PM        SECONDARY DISCHARGE DIAGNOSES  Diagnosis: History of COPD  Assessment and Plan of Treatment: Call your Health Care provider upon arrival home to make a follow up appointment within one week.  Take all inhalers as prescribed by your Health Care Provider.  Take steroids as prescribed by your Health Care Provider.  If your cough increases infrequency and severity and/or you have shortness of breath or increased shortness of breath call your Health Care Provider.  If you develop fever, chills, night sweats, malaise, and/or change in mental status call your Health care Provider.  Nutrition is very important.  Eat small frequent meals.  Increase your activity as tolerated.  Do not stay in bed all day      Diagnosis: Hypertension  Assessment and Plan of Treatment: Follow up with your medical doctor to establish long term blood pressure treatment goals.      Diagnosis: Hyperlipemia  Assessment and Plan of Treatment: c/w lipitor    Diagnosis: Cellulitis  Assessment and Plan of Treatment: #LLE Cellulitis  #BLE Lymphedema   RECOMMENDATIONS:  STOP Cefazolin 2G q8 8/27 -->    Change to PO Keflex 1000 mg po TID 8/30 -->9/3  THEN change to Chronic prophylaxis PCN  mg po twice daily x 6 months  9/4-->  continue elevation LE, compression, skin care  Please follow up with Dr. Ralph Duron outpatient .       PRINCIPAL DISCHARGE DIAGNOSIS  Diagnosis: Lymphedema  Assessment and Plan of Treatment: Chronic tissue injury bilateral buttocks/sacral region/posterior thighs  Recommendation   CTI- cavilon/daria/turn and reposition  Lymphedema: Ace on in AM off in PM        SECONDARY DISCHARGE DIAGNOSES  Diagnosis: Cellulitis  Assessment and Plan of Treatment: #LLE Cellulitis  #BLE Lymphedema   RECOMMENDATIONS:  STOP Cefazolin 2G q8 8/27 -->    Change to PO Keflex 1000 mg po TID 8/30 -->9/3  THEN change to Chronic prophylaxis PCN  mg po twice daily x 6 months  9/4-->  continue elevation LE, compression, skin care  Please follow up with Dr. Ralph Duron outpatient .      Diagnosis: Hyperlipemia  Assessment and Plan of Treatment: c/w lipitor    Diagnosis: Hypertension  Assessment and Plan of Treatment: Follow up with your medical doctor to establish long term blood pressure treatment goals.      Diagnosis: History of COPD  Assessment and Plan of Treatment: Call your Health Care provider upon arrival home to make a follow up appointment within one week.  Take all inhalers as prescribed by your Health Care Provider.  Take steroids as prescribed by your Health Care Provider.  If your cough increases infrequency and severity and/or you have shortness of breath or increased shortness of breath call your Health Care Provider.  If you develop fever, chills, night sweats, malaise, and/or change in mental status call your Health care Provider.  Nutrition is very important.  Eat small frequent meals.  Increase your activity as tolerated.  Do not stay in bed all day

## 2023-08-31 NOTE — DISCHARGE NOTE PROVIDER - NSDCCONDITION_GEN_ALL_CORE
Stable Nsaids Pregnancy And Lactation Text: These medications are considered safe up to 30 weeks gestation. It is excreted in breast milk.

## 2023-08-31 NOTE — CHART NOTE - NSCHARTNOTEFT_GEN_A_CORE
Brief Resident Note    OK to discontinue acetic acid/vinegar soaks if patient is clinically improved     Pt's case discussed with attending dermatologist Dr. Paredes. Please page [phone] with any additional questions.      aLtesha Naik MD, SHANNON   Resident Physician, PGY-3  [facility]   Pager: [phone]

## 2023-08-31 NOTE — DISCHARGE NOTE PROVIDER - CARE PROVIDER_API CALL
Ralph Duron  Infectious Disease  [address] [place] [id number]  Phone: [phone]  Fax: [phone]  Follow Up Time: 1 month    Juan Salcedo  Cardiovascular Disease  [address], [place] [address]  Phone: [phone]  Fax: [phone]  Follow Up Time: 1-3 days

## 2023-08-31 NOTE — DISCHARGE NOTE PROVIDER - CARE PROVIDERS DIRECT ADDRESSES
,[email or web address],DirectAddress_Unknown

## 2023-09-01 ENCOUNTER — TRANSCRIPTION ENCOUNTER (OUTPATIENT)
Age: 73
End: 2023-09-01

## 2023-09-01 VITALS
HEART RATE: 72 BPM | SYSTOLIC BLOOD PRESSURE: 146 MMHG | DIASTOLIC BLOOD PRESSURE: 77 MMHG | TEMPERATURE: 98 F | OXYGEN SATURATION: 98 % | RESPIRATION RATE: 18 BRPM

## 2023-09-01 LAB
CULTURE RESULTS: SIGNIFICANT CHANGE UP
ORGANISM # SPEC MICROSCOPIC CNT: SIGNIFICANT CHANGE UP
ORGANISM # SPEC MICROSCOPIC CNT: SIGNIFICANT CHANGE UP
SPECIMEN SOURCE: SIGNIFICANT CHANGE UP

## 2023-09-01 RX ORDER — CEPHALEXIN 500 MG
2 CAPSULE ORAL
Qty: 18 | Refills: 0
Start: 2023-09-01 | End: 2023-09-03

## 2023-09-01 RX ORDER — CEPHALEXIN 500 MG
2 CAPSULE ORAL
Qty: 12 | Refills: 0
Start: 2023-09-01 | End: 2023-09-02

## 2023-09-01 RX ADMIN — AMLODIPINE BESYLATE 5 MILLIGRAM(S): 2.5 TABLET ORAL at 05:49

## 2023-09-01 RX ADMIN — TIOTROPIUM BROMIDE 2 PUFF(S): 18 CAPSULE ORAL; RESPIRATORY (INHALATION) at 12:36

## 2023-09-01 RX ADMIN — ENOXAPARIN SODIUM 40 MILLIGRAM(S): 100 INJECTION SUBCUTANEOUS at 05:49

## 2023-09-01 RX ADMIN — DULOXETINE HYDROCHLORIDE 60 MILLIGRAM(S): 30 CAPSULE, DELAYED RELEASE ORAL at 05:50

## 2023-09-01 RX ADMIN — Medication 81 MILLIGRAM(S): at 12:36

## 2023-09-01 RX ADMIN — Medication 125 MICROGRAM(S): at 05:50

## 2023-09-01 RX ADMIN — Medication 5 MILLIGRAM(S): at 05:48

## 2023-09-01 RX ADMIN — GABAPENTIN 300 MILLIGRAM(S): 400 CAPSULE ORAL at 05:49

## 2023-09-01 RX ADMIN — Medication 40 MILLIGRAM(S): at 05:49

## 2023-09-01 RX ADMIN — LOSARTAN POTASSIUM 100 MILLIGRAM(S): 100 TABLET, FILM COATED ORAL at 05:48

## 2023-09-01 RX ADMIN — Medication 1000 MILLIGRAM(S): at 13:39

## 2023-09-01 RX ADMIN — MONTELUKAST 10 MILLIGRAM(S): 4 TABLET, CHEWABLE ORAL at 12:36

## 2023-09-01 RX ADMIN — Medication 75 MILLIGRAM(S): at 05:55

## 2023-09-01 RX ADMIN — CHLORHEXIDINE GLUCONATE 1 APPLICATION(S): 213 SOLUTION TOPICAL at 12:39

## 2023-09-01 RX ADMIN — Medication 10 MILLIGRAM(S): at 05:50

## 2023-09-01 RX ADMIN — Medication 1000 MILLIGRAM(S): at 05:48

## 2023-09-01 NOTE — DISCHARGE NOTE NURSING/CASE MANAGEMENT/SOCIAL WORK - PATIENT PORTAL LINK FT
lactation lactation assistance as needed.
You can access the FollowMyHealth Patient Portal offered by [facility] by registering at the following website: [email or web address]. By joining [employer]’s FollowMyHealth portal, you will also be able to view your health information using other applications (apps) compatible with our system.

## 2023-09-01 NOTE — PROGRESS NOTE ADULT - SUBJECTIVE AND OBJECTIVE BOX
Follow Up:  LLE cellulitis, venous stasis dermatitis    Interval History/ROS:  resolved LE pain    Allergies  Grapes (Hives)  Peaches (Hives)  No Known Drug Allergies  shellfish (Hives)    ANTIMICROBIALS:  ceFAZolin   IVPB 2000 every 8 hours      OTHER MEDS:  MEDICATIONS  (STANDING):  acetaminophen     Tablet .. 650 every 6 hours PRN  amLODIPine   Tablet 5 daily  aspirin enteric coated 81 daily  atorvastatin 40 at bedtime  busPIRone 10 two times a day  DULoxetine 60 two times a day  enoxaparin Injectable 40 every 24 hours  furosemide    Tablet 40 daily  gabapentin 300 two times a day  hydrALAZINE 75 two times a day  levothyroxine 125 daily  losartan 100 daily  melatonin 3 at bedtime PRN  montelukast 10 daily  OLANZapine 10 at bedtime  predniSONE   Tablet 5 daily  propranolol 20 two times a day  tiotropium 2.5 MICROgram(s) Inhaler 2 daily      Vital Signs Last 24 Hrs  T(C): 36.4 (30 Aug 2023 16:02), Max: 36.7 (30 Aug 2023 10:30)  T(F): 97.6 (30 Aug 2023 16:02), Max: 98.1 (30 Aug 2023 10:30)  HR: 76 (30 Aug 2023 16:02) (66 - 76)  BP: 133/60 (30 Aug 2023 16:02) (96/57 - 139/65)  BP(mean): --  RR: 18 (30 Aug 2023 16:02) (18 - 18)  SpO2: 97% (30 Aug 2023 16:02) (94% - 97%)    Parameters below as of 30 Aug 2023 16:02  Patient On (Oxygen Delivery Method): nasal cannula  O2 Flow (L/min): 2      PHYSICAL EXAM:  General: WN/WD NAD, Non-toxic  Neurology: A&Ox3, nonfocal  Respiratory: Clear to auscultation bilaterally  CV: RRR, S1S2, no murmurs, rubs or gallops  Abdominal: Soft, Non-tender, non-distended, normal bowel sounds  Extremities: decreased edema,  faded discoloration  - purple LLE with resolved induration  Line Sites: Clear  Skin: No rash      MICROBIOLOGY:  .Abscess Leg - Left  08-27-23   Rare Bacillus species not anthracis "Susceptibilities not performed"  Rare Enterococcus faecalis  Rare Staphylococcus epidermidis "Susceptibilities not performed"  --    No polymorphonuclear leukocytes seen per low power field  No organisms seen per oil power field      .Blood Blood-Peripheral  08-27-23   No growth at 72 Hours  --  --      .Blood Blood-Peripheral  08-27-23   No growth at 72 Hours  --  --    RADIOLOGY:    Ralph Duron MD; Division of Infectious Disease; Pager: [phone]; nights and weekends: [phone]
Cardiovascular Disease Progress Note    Overnight events: No acute events overnight.  edema improving. no new cardiac sx  Otherwise review of systems negative    Objective Findings:  T(C): 36.4 (08-31-23 @ 23:13), Max: 36.6 (08-31-23 @ 15:33)  HR: 67 (08-31-23 @ 23:13) (67 - 80)  BP: 113/52 (08-31-23 @ 23:13) (113/52 - 143/76)  RR: 18 (08-31-23 @ 23:13) (18 - 18)  SpO2: 96% (08-31-23 @ 23:13) (96% - 97%)  Wt(kg): --  Daily     Daily       Physical Exam:  Gen: NAD  HEENT: EOMI  CV: RRR, normal S1 + S2, no m/r/g  Lungs: CTAB  Abd: soft, non-tender  Ext: No edema    Telemetry:    Laboratory Data:                    Inpatient Medications:  MEDICATIONS  (STANDING):  amLODIPine   Tablet 5 milliGRAM(s) Oral daily  aspirin enteric coated 81 milliGRAM(s) Oral daily  atorvastatin 40 milliGRAM(s) Oral at bedtime  busPIRone 10 milliGRAM(s) Oral two times a day  cephalexin 1000 milliGRAM(s) Oral every 8 hours  chlorhexidine 2% Cloths 1 Application(s) Topical daily  DULoxetine 60 milliGRAM(s) Oral two times a day  enoxaparin Injectable 40 milliGRAM(s) SubCutaneous every 24 hours  furosemide    Tablet 40 milliGRAM(s) Oral daily  gabapentin 300 milliGRAM(s) Oral two times a day  hydrALAZINE 75 milliGRAM(s) Oral two times a day  levothyroxine 125 MICROGram(s) Oral daily  losartan 100 milliGRAM(s) Oral daily  montelukast 10 milliGRAM(s) Oral daily  OLANZapine 10 milliGRAM(s) Oral at bedtime  predniSONE   Tablet 5 milliGRAM(s) Oral daily  propranolol 20 milliGRAM(s) Oral two times a day  tiotropium 2.5 MICROgram(s) Inhaler 2 Puff(s) Inhalation daily      Assessment:  72-year-old female history of COPD on baseline 2 L nasal cannula, wheelchair dependent, Graves' disease, hypertension, hyperlipidemia prior breast/kidney cancer, recurrent admissions for lower extremity swelling and erythema, most recently discharged on August 1, 2023 after a course of Ancef and negative blood cultures.      Recs:  cardiac stable  no e/o chf   recent TTE mild diastolic dysfunction, normal lv/rv and valves  cw lasix 40mg po qd  rec continuous compression socks and ace wraps and leg elevation  recent le giovanni duplex neg for dvt  f/u ID and derm recs. abx per id with plan for long term ppx  further tx for lymphedema as op  dcp    Over 25 minutes spent on total encounter; more than 50% of the visit was spent counseling and/or coordinating care by the attending physician.      Juan Salcedo MD   Cardiovascular Disease  [phone]
Cardiovascular Disease Progress Note    Overnight events: No acute events overnight.  edema improving. no new cardiac sx  Otherwise review of systems negative    Objective Findings:  T(C): 36.6 (08-29-23 @ 23:34), Max: 36.9 (08-29-23 @ 15:52)  HR: 71 (08-30-23 @ 06:09) (66 - 80)  BP: 139/65 (08-30-23 @ 06:09) (131/53 - 139/65)  RR: 18 (08-29-23 @ 23:34) (18 - 18)  SpO2: 95% (08-29-23 @ 23:34) (95% - 96%)  Wt(kg): --  Daily     Daily       Physical Exam:  Gen: NAD  HEENT: EOMI  CV: RRR, normal S1 + S2, no m/r/g  Lungs: CTAB  Abd: soft, non-tender  Ext: No edema    Telemetry:    Laboratory Data:                    Inpatient Medications:  MEDICATIONS  (STANDING):  amLODIPine   Tablet 5 milliGRAM(s) Oral daily  aspirin enteric coated 81 milliGRAM(s) Oral daily  atorvastatin 40 milliGRAM(s) Oral at bedtime  busPIRone 10 milliGRAM(s) Oral two times a day  ceFAZolin   IVPB 2000 milliGRAM(s) IV Intermittent every 8 hours  chlorhexidine 2% Cloths 1 Application(s) Topical daily  DULoxetine 60 milliGRAM(s) Oral two times a day  enoxaparin Injectable 40 milliGRAM(s) SubCutaneous every 24 hours  furosemide    Tablet 40 milliGRAM(s) Oral daily  gabapentin 300 milliGRAM(s) Oral two times a day  hydrALAZINE 75 milliGRAM(s) Oral two times a day  levothyroxine 125 MICROGram(s) Oral daily  losartan 100 milliGRAM(s) Oral daily  montelukast 10 milliGRAM(s) Oral daily  OLANZapine 10 milliGRAM(s) Oral at bedtime  predniSONE   Tablet 5 milliGRAM(s) Oral daily  propranolol 20 milliGRAM(s) Oral two times a day  tiotropium 2.5 MICROgram(s) Inhaler 2 Puff(s) Inhalation daily      Assessment:  72-year-old female history of COPD on baseline 2 L nasal cannula, wheelchair dependent, Graves' disease, hypertension, hyperlipidemia prior breast/kidney cancer, recurrent admissions for lower extremity swelling and erythema, most recently discharged on August 1, 2023 after a course of Ancef and negative blood cultures.      Recs:  cardiac stable  no e/o chf   recent TTE mild diastolic dysfunction, normal lv/rv and valves  cw lasix 40mg po qd  rec continuous compression socks and ace wraps and leg elevation  recent le giovanni duplex neg for dvt  f/u ID and derm recs. abx per id  definitive tx for lymphedema as op        Over 25 minutes spent on total encounter; more than 50% of the visit was spent counseling and/or coordinating care by the attending physician.      Juan Salcedo MD   Cardiovascular Disease  [phone]
Patient is a 72y old  Female who presents with a chief complaint of cellulitis (30 Aug 2023 17:03)      SUBJECTIVE / OVERNIGHT EVENTS: Comfortable without new complaints.   Review of Systems  chest pain no  palpitations no  sob no  nausea no  headache no    MEDICATIONS  (STANDING):  amLODIPine   Tablet 5 milliGRAM(s) Oral daily  aspirin enteric coated 81 milliGRAM(s) Oral daily  atorvastatin 40 milliGRAM(s) Oral at bedtime  busPIRone 10 milliGRAM(s) Oral two times a day  ceFAZolin   IVPB 2000 milliGRAM(s) IV Intermittent every 8 hours  chlorhexidine 2% Cloths 1 Application(s) Topical daily  DULoxetine 60 milliGRAM(s) Oral two times a day  enoxaparin Injectable 40 milliGRAM(s) SubCutaneous every 24 hours  furosemide    Tablet 40 milliGRAM(s) Oral daily  gabapentin 300 milliGRAM(s) Oral two times a day  hydrALAZINE 75 milliGRAM(s) Oral two times a day  levothyroxine 125 MICROGram(s) Oral daily  losartan 100 milliGRAM(s) Oral daily  montelukast 10 milliGRAM(s) Oral daily  OLANZapine 10 milliGRAM(s) Oral at bedtime  predniSONE   Tablet 5 milliGRAM(s) Oral daily  propranolol 20 milliGRAM(s) Oral two times a day  tiotropium 2.5 MICROgram(s) Inhaler 2 Puff(s) Inhalation daily    MEDICATIONS  (PRN):  acetaminophen     Tablet .. 650 milliGRAM(s) Oral every 6 hours PRN Temp greater or equal to 38.5C (101.3F), Mild Pain (1 - 3)  melatonin 3 milliGRAM(s) Oral at bedtime PRN Insomnia      Vital Signs Last 24 Hrs  T(C): 37.4 (30 Aug 2023 18:05), Max: 37.4 (30 Aug 2023 18:05)  T(F): 99.3 (30 Aug 2023 18:05), Max: 99.3 (30 Aug 2023 18:05)  HR: 89 (30 Aug 2023 18:05) (66 - 89)  BP: 116/80 (30 Aug 2023 18:05) (96/57 - 139/65)  BP(mean): --  RR: 18 (30 Aug 2023 16:02) (18 - 18)  SpO2: 97% (30 Aug 2023 16:02) (94% - 97%)    Parameters below as of 30 Aug 2023 16:02  Patient On (Oxygen Delivery Method): nasal cannula  O2 Flow (L/min): 2      PHYSICAL EXAM:  GENERAL: NAD, well-developed  HEAD:  Atraumatic, Normocephalic  EYES: EOMI, PERRLA, conjunctiva and sclera clear  NECK: Supple, No JVD  CHEST/LUNG: Clear to auscultation bilaterally; No wheeze  HEART: Regular rate and rhythm; No murmurs, rubs, or gallops  ABDOMEN: Soft, Nontender, Nondistended; Bowel sounds present  EXTREMITIES:  3+bipedal edema, improving erythema   PSYCH: AAOx3, anxious  NEUROLOGY: non-focal  SKIN: No rashes or lesions    LABS:                      RADIOLOGY & ADDITIONAL TESTS:    Imaging Personally Reviewed:    Consultant(s) Notes Reviewed:      Care Discussed with Consultants/Other Providers:  
Patient is a 72y old  Female who presents with a chief complaint of cellulits,  venous stasis dermatitis (31 Aug 2023 21:04)      SUBJECTIVE / OVERNIGHT EVENTS: Comfortable without new complaints.   Review of Systems  chest pain no  palpitations no  sob no  nausea no  headache no    MEDICATIONS  (STANDING):  amLODIPine   Tablet 5 milliGRAM(s) Oral daily  aspirin enteric coated 81 milliGRAM(s) Oral daily  atorvastatin 40 milliGRAM(s) Oral at bedtime  busPIRone 10 milliGRAM(s) Oral two times a day  cephalexin 1000 milliGRAM(s) Oral every 8 hours  chlorhexidine 2% Cloths 1 Application(s) Topical daily  DULoxetine 60 milliGRAM(s) Oral two times a day  enoxaparin Injectable 40 milliGRAM(s) SubCutaneous every 24 hours  furosemide    Tablet 40 milliGRAM(s) Oral daily  gabapentin 300 milliGRAM(s) Oral two times a day  hydrALAZINE 75 milliGRAM(s) Oral two times a day  levothyroxine 125 MICROGram(s) Oral daily  losartan 100 milliGRAM(s) Oral daily  montelukast 10 milliGRAM(s) Oral daily  OLANZapine 10 milliGRAM(s) Oral at bedtime  predniSONE   Tablet 5 milliGRAM(s) Oral daily  propranolol 20 milliGRAM(s) Oral two times a day  tiotropium 2.5 MICROgram(s) Inhaler 2 Puff(s) Inhalation daily    MEDICATIONS  (PRN):  acetaminophen     Tablet .. 650 milliGRAM(s) Oral every 6 hours PRN Temp greater or equal to 38.5C (101.3F), Mild Pain (1 - 3)  melatonin 3 milliGRAM(s) Oral at bedtime PRN Insomnia      Vital Signs Last 24 Hrs  T(C): 36.6 (01 Sep 2023 15:37), Max: 36.8 (01 Sep 2023 09:22)  T(F): 97.8 (01 Sep 2023 15:37), Max: 98.2 (01 Sep 2023 09:22)  HR: 72 (01 Sep 2023 15:37) (67 - 90)  BP: 146/77 (01 Sep 2023 15:37) (112/68 - 146/77)  BP(mean): --  RR: 18 (01 Sep 2023 15:37) (18 - 18)  SpO2: 98% (01 Sep 2023 15:37) (96% - 100%)    Parameters below as of 01 Sep 2023 15:37  Patient On (Oxygen Delivery Method): nasal cannula  O2 Flow (L/min): 2      PHYSICAL EXAM:  GENERAL: NAD, well-developed  HEAD:  Atraumatic, Normocephalic  EYES: EOMI, PERRLA, conjunctiva and sclera clear  NECK: Supple, No JVD  CHEST/LUNG: Clear to auscultation bilaterally; No wheeze  HEART: Regular rate and rhythm; No murmurs, rubs, or gallops  ABDOMEN: Soft, Nontender, Nondistended; Bowel sounds present  EXTREMITIES:  2+ bipedal edema erythema improving   PSYCH: AAOx3  NEUROLOGY: non-focal  SKIN: No rashes or lesions    LABS:                    Culture - Blood (collected 30 Aug 2023 18:46)  Source: .Blood Blood-Peripheral  Preliminary Report (31 Aug 2023 23:02):    No growth at 24 hours        RADIOLOGY & ADDITIONAL TESTS:    Imaging Personally Reviewed:    Consultant(s) Notes Reviewed:      Care Discussed with Consultants/Other Providers:  
Patient is a 72y old  Female who presents with a chief complaint of recurrent cellulitis  (31 Aug 2023 12:44)      SUBJECTIVE / OVERNIGHT EVENTS: feels better Family at bedside.   Review of Systems  chest pain no  palpitations no  sob no  nausea no  headache no    MEDICATIONS  (STANDING):  amLODIPine   Tablet 5 milliGRAM(s) Oral daily  aspirin enteric coated 81 milliGRAM(s) Oral daily  atorvastatin 40 milliGRAM(s) Oral at bedtime  busPIRone 10 milliGRAM(s) Oral two times a day  cephalexin 1000 milliGRAM(s) Oral every 8 hours  chlorhexidine 2% Cloths 1 Application(s) Topical daily  DULoxetine 60 milliGRAM(s) Oral two times a day  enoxaparin Injectable 40 milliGRAM(s) SubCutaneous every 24 hours  furosemide    Tablet 40 milliGRAM(s) Oral daily  gabapentin 300 milliGRAM(s) Oral two times a day  hydrALAZINE 75 milliGRAM(s) Oral two times a day  levothyroxine 125 MICROGram(s) Oral daily  losartan 100 milliGRAM(s) Oral daily  montelukast 10 milliGRAM(s) Oral daily  OLANZapine 10 milliGRAM(s) Oral at bedtime  predniSONE   Tablet 5 milliGRAM(s) Oral daily  propranolol 20 milliGRAM(s) Oral two times a day  tiotropium 2.5 MICROgram(s) Inhaler 2 Puff(s) Inhalation daily    MEDICATIONS  (PRN):  acetaminophen     Tablet .. 650 milliGRAM(s) Oral every 6 hours PRN Temp greater or equal to 38.5C (101.3F), Mild Pain (1 - 3)  melatonin 3 milliGRAM(s) Oral at bedtime PRN Insomnia      Vital Signs Last 24 Hrs  T(C): 36.6 (31 Aug 2023 15:33), Max: 37.4 (30 Aug 2023 18:05)  T(F): 97.9 (31 Aug 2023 15:33), Max: 99.3 (30 Aug 2023 18:05)  HR: 75 (31 Aug 2023 15:33) (67 - 89)  BP: 132/73 (31 Aug 2023 15:33) (116/69 - 133/65)  BP(mean): --  RR: 18 (31 Aug 2023 15:33) (18 - 18)  SpO2: 97% (31 Aug 2023 15:33) (97% - 97%)    Parameters below as of 31 Aug 2023 15:33  Patient On (Oxygen Delivery Method): nasal cannula  O2 Flow (L/min): 2      PHYSICAL EXAM:  GENERAL: NAD, well-developed  HEAD:  Atraumatic, Normocephalic  EYES: EOMI, PERRLA, conjunctiva and sclera clear  NECK: Supple, No JVD  CHEST/LUNG: Clear to auscultation bilaterally; No wheeze  HEART: Regular rate and rhythm; No murmurs, rubs, or gallops  ABDOMEN: Soft, Nontender, Nondistended; Bowel sounds present  EXTREMITIES:  2-3+ bipedal edema improving erythema   PSYCH: AAOx3  NEUROLOGY: non-focal  SKIN: No rashes or lesions    LABS:                      RADIOLOGY & ADDITIONAL TESTS:    Imaging Personally Reviewed:    Consultant(s) Notes Reviewed:      Care Discussed with Consultants/Other Providers:  
  Follow Up:  cellulits,  venous stasis dermatitis    Interval History/ROS:  feels better    Allergies  Grapes (Hives)  Peaches (Hives)  No Known Drug Allergies  shellfish (Hives)    ANTIMICROBIALS:  cephalexin 1000 every 8 hours      OTHER MEDS:  MEDICATIONS  (STANDING):  acetaminophen     Tablet .. 650 every 6 hours PRN  amLODIPine   Tablet 5 daily  aspirin enteric coated 81 daily  atorvastatin 40 at bedtime  busPIRone 10 two times a day  DULoxetine 60 two times a day  enoxaparin Injectable 40 every 24 hours  furosemide    Tablet 40 daily  gabapentin 300 two times a day  hydrALAZINE 75 two times a day  levothyroxine 125 daily  losartan 100 daily  melatonin 3 at bedtime PRN  montelukast 10 daily  OLANZapine 10 at bedtime  predniSONE   Tablet 5 daily  propranolol 20 two times a day  tiotropium 2.5 MICROgram(s) Inhaler 2 daily      Vital Signs Last 24 Hrs  T(C): 36.6 (31 Aug 2023 15:33), Max: 36.7 (31 Aug 2023 00:01)  T(F): 97.9 (31 Aug 2023 15:33), Max: 98 (31 Aug 2023 00:01)  HR: 80 (31 Aug 2023 17:50) (67 - 80)  BP: 143/76 (31 Aug 2023 17:50) (116/69 - 143/76)  BP(mean): --  RR: 18 (31 Aug 2023 15:33) (18 - 18)  SpO2: 97% (31 Aug 2023 15:33) (97% - 97%)    Parameters below as of 31 Aug 2023 15:33  Patient On (Oxygen Delivery Method): nasal cannula  O2 Flow (L/min): 2      PHYSICAL EXAM:  General: WN/WD NAD, Non-toxic  Neurology: A&Ox3, nonfocal  Respiratory: Clear to auscultation bilaterally  CV: RRR, S1S2, no murmurs, rubs or gallops  Abdominal: Soft, Non-tender, non-distended, normal bowel sounds  Extremities: lymphedema, faint residual erythema  Line Sites: Clear  Skin: No rash                    MICROBIOLOGY:  .Abscess Leg - Left  08-27-23   Rare Bacillus species not anthracis "Susceptibilities not performed"  Rare Enterococcus faecalis  Rare Staphylococcus epidermidis "Susceptibilities not performed"  --  Enterococcus faecalis      .Blood Blood-Peripheral  08-27-23   No growth at 4 days  --  --      .Blood Blood-Peripheral  08-27-23   No growth at 4 days  --  --      RADIOLOGY:    Ralph Duron MD; Division of Infectious Disease; Pager: [phone]; nights and weekends: [phone]
Cardiovascular Disease Progress Note    Overnight events: No acute events overnight.  no new cardiac sx  Otherwise review of systems negative    Objective Findings:  T(C): 36.7 (23 @ 00:01), Max: 37.4 (23 @ 18:05)  HR: 67 (23 @ 00:01) (67 - 89)  BP: 116/69 (23 @ 00:01) (96/57 - 133/60)  RR: 18 (23 @ 00:01) (18 - 18)  SpO2: 97% (23 @ 00:01) (94% - 97%)  Wt(kg): --  Daily     Daily Weight in k.8 (30 Aug 2023 08:04)      Physical Exam:  Gen: NAD  HEENT: EOMI  CV: RRR, normal S1 + S2, no m/r/g  Lungs: CTAB  Abd: soft, non-tender  Ext: No edema    Telemetry:    Laboratory Data:                    Inpatient Medications:  MEDICATIONS  (STANDING):  amLODIPine   Tablet 5 milliGRAM(s) Oral daily  aspirin enteric coated 81 milliGRAM(s) Oral daily  atorvastatin 40 milliGRAM(s) Oral at bedtime  busPIRone 10 milliGRAM(s) Oral two times a day  ceFAZolin   IVPB 2000 milliGRAM(s) IV Intermittent every 8 hours  chlorhexidine 2% Cloths 1 Application(s) Topical daily  DULoxetine 60 milliGRAM(s) Oral two times a day  enoxaparin Injectable 40 milliGRAM(s) SubCutaneous every 24 hours  furosemide    Tablet 40 milliGRAM(s) Oral daily  gabapentin 300 milliGRAM(s) Oral two times a day  hydrALAZINE 75 milliGRAM(s) Oral two times a day  levothyroxine 125 MICROGram(s) Oral daily  losartan 100 milliGRAM(s) Oral daily  montelukast 10 milliGRAM(s) Oral daily  OLANZapine 10 milliGRAM(s) Oral at bedtime  predniSONE   Tablet 5 milliGRAM(s) Oral daily  propranolol 20 milliGRAM(s) Oral two times a day  tiotropium 2.5 MICROgram(s) Inhaler 2 Puff(s) Inhalation daily      Assessment:  72-year-old female history of COPD on baseline 2 L nasal cannula, wheelchair dependent, Graves' disease, hypertension, hyperlipidemia prior breast/kidney cancer, recurrent admissions for lower extremity swelling and erythema, most recently discharged on 2023 after a course of Ancef and negative blood cultures.      Recs:  cardiac stable  no e/o chf   recent TTE mild diastolic dysfunction, normal lv/rv and valves  cw lasix 40mg po qd  rec continuous compression socks and ace wraps and leg elevation  recent le giovanni duplex neg for dvt  f/u ID and derm recs. abx per id with plan for long term ppx  definitive tx for lymphedema as op      Over 25 minutes spent on total encounter; more than 50% of the visit was spent counseling and/or coordinating care by the attending physician.      Juan Salcedo MD   Cardiovascular Disease  [phone]
Cardiovascular Disease Progress Note    Overnight events: No acute events overnight.  no new cardiac sx  Otherwise review of systems negative    Objective Findings:  T(C): 37.4 (08-28-23 @ 23:20), Max: 37.4 (08-28-23 @ 23:20)  HR: 76 (08-29-23 @ 05:28) (64 - 78)  BP: 143/72 (08-29-23 @ 05:28) (118/56 - 145/62)  RR: 17 (08-29-23 @ 05:28) (17 - 18)  SpO2: 96% (08-29-23 @ 05:28) (94% - 96%)  Wt(kg): --  Daily     Daily       Physical Exam:  Gen: NAD  HEENT: EOMI  CV: RRR, normal S1 + S2, no m/r/g  Lungs: CTAB  Abd: soft, non-tender  Ext: + edema    Telemetry:    Laboratory Data:                        11.4   9.37  )-----------( 164      ( 28 Aug 2023 07:03 )             36.2     08-28    140  |  99  |  15  ----------------------------<  132<H>  3.8   |  27  |  0.64    Ca    9.9      28 Aug 2023 07:01    TPro  6.8  /  Alb  3.5  /  TBili  0.6  /  DBili  x   /  AST  13  /  ALT  11  /  AlkPhos  113  08-27              Inpatient Medications:  MEDICATIONS  (STANDING):  amLODIPine   Tablet 5 milliGRAM(s) Oral daily  aspirin enteric coated 81 milliGRAM(s) Oral daily  atorvastatin 40 milliGRAM(s) Oral at bedtime  busPIRone 10 milliGRAM(s) Oral two times a day  ceFAZolin   IVPB 2000 milliGRAM(s) IV Intermittent every 8 hours  chlorhexidine 2% Cloths 1 Application(s) Topical daily  DULoxetine 60 milliGRAM(s) Oral two times a day  enoxaparin Injectable 40 milliGRAM(s) SubCutaneous every 24 hours  furosemide    Tablet 40 milliGRAM(s) Oral daily  gabapentin 300 milliGRAM(s) Oral two times a day  hydrALAZINE 75 milliGRAM(s) Oral two times a day  levothyroxine 125 MICROGram(s) Oral daily  losartan 100 milliGRAM(s) Oral daily  montelukast 10 milliGRAM(s) Oral daily  OLANZapine 10 milliGRAM(s) Oral at bedtime  predniSONE   Tablet 5 milliGRAM(s) Oral daily  propranolol 20 milliGRAM(s) Oral two times a day  tiotropium 2.5 MICROgram(s) Inhaler 2 Puff(s) Inhalation daily      Assessment:  72-year-old female history of COPD on baseline 2 L nasal cannula, wheelchair dependent, Graves' disease, hypertension, hyperlipidemia prior breast/kidney cancer, recurrent admissions for lower extremity swelling and erythema, most recently discharged on August 1, 2023 after a course of Ancef and negative blood cultures.      Recs:  cardiac stable  no e/o chf   recent TTE mild diastolic dysfunction, normal lv/rv and valves  cw lasix 40mg po qd  rec continuous compression socks and ace wraps and leg elevation  recent le giovanni duplex neg for dvt  f/u ID and derm recs. abx per id  to benefit from lymphedema pump        Over 25 minutes spent on total encounter; more than 50% of the visit was spent counseling and/or coordinating care by the attending physician.      Juan Salcedo MD   Cardiovascular Disease  [phone]
  Follow Up:  cellulitis    Interval History/ROS:  feels better  pain resolved    Allergies  Grapes (Hives)  Peaches (Hives)  No Known Drug Allergies  shellfish (Hives)        ANTIMICROBIALS:  ceFAZolin   IVPB 2000 every 8 hours      OTHER MEDS:  MEDICATIONS  (STANDING):  acetaminophen     Tablet .. 650 every 6 hours PRN  amLODIPine   Tablet 5 daily  aspirin enteric coated 81 daily  atorvastatin 40 at bedtime  busPIRone 10 two times a day  DULoxetine 60 two times a day  enoxaparin Injectable 40 every 24 hours  furosemide    Tablet 40 daily  gabapentin 300 two times a day  hydrALAZINE 75 two times a day  levothyroxine 125 daily  losartan 100 daily  melatonin 3 at bedtime PRN  montelukast 10 daily  OLANZapine 10 at bedtime  predniSONE   Tablet 5 daily  propranolol 20 two times a day  tiotropium 2.5 MICROgram(s) Inhaler 2 daily      Vital Signs Last 24 Hrs  T(C): 37.1 (28 Aug 2023 17:30), Max: 37.1 (28 Aug 2023 17:30)  T(F): 98.8 (28 Aug 2023 17:30), Max: 98.8 (28 Aug 2023 17:30)  HR: 74 (28 Aug 2023 17:30) (74 - 93)  BP: 138/69 (28 Aug 2023 17:30) (110/83 - 145/62)  BP(mean): --  RR: 18 (28 Aug 2023 17:30) (18 - 19)  SpO2: 95% (28 Aug 2023 17:30) (94% - 98%)    Parameters below as of 28 Aug 2023 17:30  Patient On (Oxygen Delivery Method): nasal cannula  O2 Flow (L/min): 2      PHYSICAL EXAM:  General: WN/WD NAD, Non-toxic  Neurology: A&Ox3, nonfocal  Respiratory: Clear to auscultation bilaterally  CV: RRR, S1S2, no murmurs, rubs or gallops  Abdominal: Soft, Non-tender, non-distended, normal bowel sounds  Extremities: bilateral edema,  LLE erythema and induration evolved/improved  Line Sites: Clear  Skin: No rash                          11.4   9.37  )-----------( 164      ( 28 Aug 2023 07:03 )             36.2       08-28    140  |  99  |  15  ----------------------------<  132<H>  3.8   |  27  |  0.64    Ca    9.9      28 Aug 2023 07:01    TPro  6.8  /  Alb  3.5  /  TBili  0.6  /  DBili  x   /  AST  13  /  ALT  11  /  AlkPhos  113  08-27      Urinalysis Basic - ( 28 Aug 2023 07:01 )    Color: x / Appearance: x / SG: x / pH: x  Gluc: 132 mg/dL / Ketone: x  / Bili: x / Urobili: x   Blood: x / Protein: x / Nitrite: x   Leuk Esterase: x / RBC: x / WBC x   Sq Epi: x / Non Sq Epi: x / Bacteria: x        MICROBIOLOGY:  .Abscess Leg - Left  08-27-23 --  --    No polymorphonuclear leukocytes seen per low power field  No organisms seen per oil power field      .Blood Blood-Peripheral  08-27-23   No growth at 24 hours  --  --      .Blood Blood-Peripheral  08-27-23   No growth at 24 hours  --  --    RADIOLOGY:  < from: CT Lower Extremity w/ IV Cont, Bilateral (08.27.23 @ 11:40) >  FINDINGS:  RIGHT:  The partially imaged femoral vein is patent. The popliteal vein is   patent. The veins in the calf (TPtrunk, anterior tibial, posterior   tibial and peroneal) are not well visualized given the timing of the   contrast bolus, however there is no large thrombus identified.    LEFT:  The partially imaged femoral vein is patent. The popliteal vein is   patent. The veins in the calf (TP trunk, anterior tibial, posterior   tibial and peroneal) are not well visualized given the timing of the   contrast bolus, however there is no large thrombus identified.    ALSO NOTED:  Right distal femur fixation plate and screw fixation without   periprosthetic lucency or fracture. Extensive subcutaneous edema within   the lower extremities without a drainable fluid collection.      IMPRESSION:  1.  No evidence of deep venous thrombosis as described.  2.  Extensive subcutaneous edema within the lower extremities without   drainable fluid collection.    < end of copied text >      Ralph Duron MD; Division of Infectious Disease; Pager: [phone]; nights and weekends: [phone]
Patient is a 72y old  Female who presents with a chief complaint of cellulitis (28 Aug 2023 18:49)      SUBJECTIVE / OVERNIGHT EVENTS: Comfortable without new complaints.   Review of Systems  chest pain no  palpitations no  sob no  nausea no  headache no    MEDICATIONS  (STANDING):  amLODIPine   Tablet 5 milliGRAM(s) Oral daily  aspirin enteric coated 81 milliGRAM(s) Oral daily  atorvastatin 40 milliGRAM(s) Oral at bedtime  busPIRone 10 milliGRAM(s) Oral two times a day  ceFAZolin   IVPB 2000 milliGRAM(s) IV Intermittent every 8 hours  chlorhexidine 2% Cloths 1 Application(s) Topical daily  DULoxetine 60 milliGRAM(s) Oral two times a day  enoxaparin Injectable 40 milliGRAM(s) SubCutaneous every 24 hours  furosemide    Tablet 40 milliGRAM(s) Oral daily  gabapentin 300 milliGRAM(s) Oral two times a day  hydrALAZINE 75 milliGRAM(s) Oral two times a day  levothyroxine 125 MICROGram(s) Oral daily  losartan 100 milliGRAM(s) Oral daily  montelukast 10 milliGRAM(s) Oral daily  OLANZapine 10 milliGRAM(s) Oral at bedtime  predniSONE   Tablet 5 milliGRAM(s) Oral daily  propranolol 20 milliGRAM(s) Oral two times a day  tiotropium 2.5 MICROgram(s) Inhaler 2 Puff(s) Inhalation daily    MEDICATIONS  (PRN):  acetaminophen     Tablet .. 650 milliGRAM(s) Oral every 6 hours PRN Temp greater or equal to 38.5C (101.3F), Mild Pain (1 - 3)  melatonin 3 milliGRAM(s) Oral at bedtime PRN Insomnia      Vital Signs Last 24 Hrs  T(C): 37.1 (28 Aug 2023 17:30), Max: 37.1 (28 Aug 2023 17:30)  T(F): 98.8 (28 Aug 2023 17:30), Max: 98.8 (28 Aug 2023 17:30)  HR: 74 (28 Aug 2023 17:30) (74 - 93)  BP: 138/69 (28 Aug 2023 17:30) (110/83 - 145/62)  BP(mean): --  RR: 18 (28 Aug 2023 17:30) (18 - 19)  SpO2: 95% (28 Aug 2023 17:30) (94% - 98%)    Parameters below as of 28 Aug 2023 17:30  Patient On (Oxygen Delivery Method): nasal cannula  O2 Flow (L/min): 2      PHYSICAL EXAM:  GENERAL: NAD, well-developed  HEAD:  Atraumatic, Normocephalic  EYES: EOMI, PERRLA, conjunctiva and sclera clear  NECK: Supple, No JVD  CHEST/LUNG: Clear to auscultation bilaterally; No wheeze  HEART: Regular rate and rhythm; No murmurs, rubs, or gallops  ABDOMEN: Soft, Nontender, Nondistended; Bowel sounds present  EXTREMITIES:  3+bipedal edema, erythema improving   PSYCH: AAOx3  NEUROLOGY: non-focal  SKIN: No rashes or lesions    LABS:                        11.4   9.37  )-----------( 164      ( 28 Aug 2023 07:03 )             36.2     08-28    140  |  99  |  15  ----------------------------<  132<H>  3.8   |  27  |  0.64    Ca    9.9      28 Aug 2023 07:01    TPro  6.8  /  Alb  3.5  /  TBili  0.6  /  DBili  x   /  AST  13  /  ALT  11  /  AlkPhos  113  08-27          Urinalysis Basic - ( 28 Aug 2023 07:01 )    Color: x / Appearance: x / SG: x / pH: x  Gluc: 132 mg/dL / Ketone: x  / Bili: x / Urobili: x   Blood: x / Protein: x / Nitrite: x   Leuk Esterase: x / RBC: x / WBC x   Sq Epi: x / Non Sq Epi: x / Bacteria: x        Culture - Abscess with Gram Stain (collected 27 Aug 2023 14:20)  Source: .Abscess Leg - Left  Gram Stain (28 Aug 2023 00:14):    No polymorphonuclear leukocytes seen per low power field    No organisms seen per oil power field    Culture - Blood (collected 27 Aug 2023 10:30)  Source: .Blood Blood-Peripheral  Preliminary Report (28 Aug 2023 13:02):    No growth at 24 hours    Culture - Blood (collected 27 Aug 2023 10:20)  Source: .Blood Blood-Peripheral  Preliminary Report (28 Aug 2023 13:02):    No growth at 24 hours        RADIOLOGY & ADDITIONAL TESTS:    Imaging Personally Reviewed:    Consultant(s) Notes Reviewed:      Care Discussed with Consultants/Other Providers:  
Patient is a 72y old  Female who presents with a chief complaint of cellulitis (28 Aug 2023 18:49)      SUBJECTIVE / OVERNIGHT EVENTS: feels better.  and son at bedside.   Review of Systems  chest pain no  palpitations no  sob no  nausea no  headache no    MEDICATIONS  (STANDING):  amLODIPine   Tablet 5 milliGRAM(s) Oral daily  aspirin enteric coated 81 milliGRAM(s) Oral daily  atorvastatin 40 milliGRAM(s) Oral at bedtime  busPIRone 10 milliGRAM(s) Oral two times a day  ceFAZolin   IVPB 2000 milliGRAM(s) IV Intermittent every 8 hours  chlorhexidine 2% Cloths 1 Application(s) Topical daily  DULoxetine 60 milliGRAM(s) Oral two times a day  enoxaparin Injectable 40 milliGRAM(s) SubCutaneous every 24 hours  furosemide    Tablet 40 milliGRAM(s) Oral daily  gabapentin 300 milliGRAM(s) Oral two times a day  hydrALAZINE 75 milliGRAM(s) Oral two times a day  levothyroxine 125 MICROGram(s) Oral daily  losartan 100 milliGRAM(s) Oral daily  montelukast 10 milliGRAM(s) Oral daily  OLANZapine 10 milliGRAM(s) Oral at bedtime  predniSONE   Tablet 5 milliGRAM(s) Oral daily  propranolol 20 milliGRAM(s) Oral two times a day  tiotropium 2.5 MICROgram(s) Inhaler 2 Puff(s) Inhalation daily    MEDICATIONS  (PRN):  acetaminophen     Tablet .. 650 milliGRAM(s) Oral every 6 hours PRN Temp greater or equal to 38.5C (101.3F), Mild Pain (1 - 3)  melatonin 3 milliGRAM(s) Oral at bedtime PRN Insomnia      Vital Signs Last 24 Hrs  T(C): 36.5 (29 Aug 2023 10:11), Max: 37.4 (28 Aug 2023 23:20)  T(F): 97.7 (29 Aug 2023 10:11), Max: 99.3 (28 Aug 2023 23:20)  HR: 71 (29 Aug 2023 10:11) (64 - 76)  BP: 132/70 (29 Aug 2023 10:11) (118/56 - 143/72)  BP(mean): --  RR: 18 (29 Aug 2023 10:11) (17 - 18)  SpO2: 96% (29 Aug 2023 10:11) (94% - 96%)    Parameters below as of 29 Aug 2023 10:11  Patient On (Oxygen Delivery Method): nasal cannula  O2 Flow (L/min): 2      PHYSICAL EXAM:  GENERAL: NAD, well-developed  HEAD:  Atraumatic, Normocephalic  EYES: EOMI, PERRLA, conjunctiva and sclera clear  NECK: Supple, No JVD  CHEST/LUNG: Clear to auscultation bilaterally; No wheeze  HEART: Regular rate and rhythm; No murmurs, rubs, or gallops  ABDOMEN: Soft, Nontender, Nondistended; Bowel sounds present  EXTREMITIES:  3+ bipedal edema erythema improving   PSYCH: AAOx3  NEUROLOGY: non-focal  SKIN: No rashes or lesions    LABS:                        11.4   9.37  )-----------( 164      ( 28 Aug 2023 07:03 )             36.2     08-28    140  |  99  |  15  ----------------------------<  132<H>  3.8   |  27  |  0.64    Ca    9.9      28 Aug 2023 07:01            Urinalysis Basic - ( 28 Aug 2023 07:01 )    Color: x / Appearance: x / SG: x / pH: x  Gluc: 132 mg/dL / Ketone: x  / Bili: x / Urobili: x   Blood: x / Protein: x / Nitrite: x   Leuk Esterase: x / RBC: x / WBC x   Sq Epi: x / Non Sq Epi: x / Bacteria: x        Culture - Abscess with Gram Stain (collected 27 Aug 2023 14:20)  Source: .Abscess Leg - Left  Gram Stain (28 Aug 2023 00:14):    No polymorphonuclear leukocytes seen per low power field    No organisms seen per oil power field  Preliminary Report (28 Aug 2023 22:06):    Rare Bacillus species not anthracis    "Susceptibilities not performed"    Culture - Blood (collected 27 Aug 2023 10:30)  Source: .Blood Blood-Peripheral  Preliminary Report (28 Aug 2023 13:02):    No growth at 24 hours    Culture - Blood (collected 27 Aug 2023 10:20)  Source: .Blood Blood-Peripheral  Preliminary Report (28 Aug 2023 13:02):    No growth at 24 hours        RADIOLOGY & ADDITIONAL TESTS:    Imaging Personally Reviewed:    Consultant(s) Notes Reviewed:      Care Discussed with Consultants/Other Providers:

## 2023-09-01 NOTE — PROGRESS NOTE ADULT - PROVIDER SPECIALTY LIST ADULT
Cardiology
Cardiology
Infectious Disease
Internal Medicine
Cardiology
Cardiology
Infectious Disease
Internal Medicine
Infectious Disease
all bags, which were secured in cabinet, including clothing and backpack

## 2023-09-01 NOTE — DISCHARGE NOTE NURSING/CASE MANAGEMENT/SOCIAL WORK - NSDCVIVACCINE_GEN_ALL_CORE_FT
Tdap; 19-Nov-2019 16:31; Christine Skaggs (RN); [employer]; [id number] (Exp. Date: 17-Sep-2021); IntraMuscular; Deltoid Left.; 0.5 milliLiter(s); VIS (VIS Published: 09-May-2013, VIS Presented: 19-Nov-2019);

## 2023-09-01 NOTE — PROGRESS NOTE ADULT - ASSESSMENT
72 f with    Cellulitis legs  - antibiotic  - ID follow    Dermatitis  - dermatology evaluation noted    Acute on Chronic Systolic Congestive Heart Failure  - diurese  - cardiology follow Dr. Salcedo    Dysphagia/ Esophageal spasms  - CCB for esophageal spasms    Anxiety / Depression   - control  - Ativan 0.5 mg bid prn    Breast cancer in situ, left   - stable. Follow as OTP with Oncology    COPD (chronic obstructive pulmonary disease)   - nebs    Graves disease/  Hypothyroid   - continue supplementation  - follow TSH    Hyperlipidemia   - stable    Hypertension   - control    Kidney cancer, primary, with metastasis from kidney to other site, left LEft sided- s/p nephrectomy only- no chemo or RT  - stable    Obesity   - Nutrition education    DVT prophylaxis  - AC     d/w patient and     Nathan Mora MD phone [phone] 
72F with Lymphedema, COPD on baseline 2 L nasal cannula, wheelchair dependent, Graves' disease, hypertension, hyperlipidemia prior breast/kidney cancer, recurrent admissions (7/25 - 8/1) for lower extremity swelling and erythema, treated with Cefazolin 2G q8 for Cellulitis, discharged on Keflex 1G q6, BCx negative MRSA swab negative, had followup with PMD who extended her Keflex to additional 2 weeks, and had completed her PO keflex 2 days ago, admitted 8/27/23 with worsening LE swelling   afebrile, WBC 11, CRP 15,     Ms To has been not taking diuretic regularly - Her mobility is limited and she finds the requirement to get up to void difficult and uncomfortable  On exam, there is bilateral venous stasis changes, however LLE is more indurated and tender  Denies fever or chills  Denies recent trauma or bug bites  Reports non compliant to diuretics     DIAGNOSIS and IMPRESSION:  #LLE Cellulitis  #BLE Lymphedema     RECOMMENDATIONS:  Continue Cefazolin 2G q8 8/27 -->  if sustained improvement change to PO Keflex 1000 mg po TID 8/30 -->9/3    THEN change to Chronic prophylaxis PCN  mg po twice daily x 6 months  9/4-->  continue elevation LE, compression, skin care      
72 f with    Cellulitis legs  - antibiotic  - CT legs noted  - ID evaluation     Dermatitis  - dermatology evaluation noted    Acute on Chronic Systolic Congestive Heart Failure  - diurese  - cardiology follow Dr. Salcedo    Dysphagia/ Esophageal spasms  - CCB for esophageal spasms    Anxiety / Depression   - control  - Ativan 0.5 mg bid prn    Breast cancer in situ, left   - stable. Follow as OTP with Oncology    COPD (chronic obstructive pulmonary disease)   - nebs    Graves disease/  Hypothyroid   - continue supplementation  - follow TSH    Hyperlipidemia   - stable    Hypertension   - control    Kidney cancer, primary, with metastasis from kidney to other site, left LEft sided- s/p nephrectomy only- no chemo or RT  - stable    Obesity   - Nutrition education    DVT prophylaxis  - AC     d/w patient     Nathan Mora MD phone [phone] 
72 f with    Cellulitis legs  - antibiotic  - ID follow    Dermatitis  - dermatology evaluation noted    Acute on Chronic Systolic Congestive Heart Failure  - diurese  - cardiology follow Dr. Salcedo    Dysphagia/ Esophageal spasms  - CCB for esophageal spasms    Anxiety / Depression   - control  - Ativan 0.5 mg bid prn    Breast cancer in situ, left   - stable. Follow as OTP with Oncology    COPD (chronic obstructive pulmonary disease)   - nebs    Graves disease/  Hypothyroid   - continue supplementation  - follow TSH    Hyperlipidemia   - stable    Hypertension   - control    Kidney cancer, primary, with metastasis from kidney to other site, left LEft sided- s/p nephrectomy only- no chemo or RT  - stable    Obesity   - Nutrition education    DVT prophylaxis  - AC    DCP home.    Nathan Mora MD phone [phone] 
72 f with    Cellulitis legs improving   - antibiotic  - ID follow    Dermatitis  - dermatology evaluation noted    Acute on Chronic Systolic Congestive Heart Failure  - diurese  - cardiology follow Dr. Salcedo    Dysphagia/ Esophageal spasms  - CCB for esophageal spasms    Anxiety / Depression   - control  - Ativan 0.5 mg bid prn    Breast cancer in situ, left   - stable. Follow as OTP with Oncology    COPD (chronic obstructive pulmonary disease)   - nebs    Graves disease/  Hypothyroid   - continue supplementation  - follow TSH    Hyperlipidemia   - stable    Hypertension   - control    Kidney cancer, primary, with metastasis from kidney to other site, left LEft sided- s/p nephrectomy only- no chemo or RT  - stable    Obesity   - Nutrition education    DVT prophylaxis  - AC    DC home. Follow with PMD/ Cardiology in 3-4 days. QA      Nathan Mora MD phone [phone] 
72 f with    Cellulitis legs improving   - antibiotic  - ID follow    Dermatitis  - dermatology evaluation noted    Acute on Chronic Systolic Congestive Heart Failure  - diurese  - cardiology follow Dr. Salcedo    Dysphagia/ Esophageal spasms  - CCB for esophageal spasms    Anxiety / Depression   - control  - Ativan 0.5 mg bid prn    Breast cancer in situ, left   - stable. Follow as OTP with Oncology    COPD (chronic obstructive pulmonary disease)   - nebs    Graves disease/  Hypothyroid   - continue supplementation  - follow TSH    Hyperlipidemia   - stable    Hypertension   - control    Kidney cancer, primary, with metastasis from kidney to other site, left LEft sided- s/p nephrectomy only- no chemo or RT  - stable    Obesity   - Nutrition education    DVT prophylaxis  - AC    DCP home in am if arrangements in place.     Nathan Mora MD phone [phone] 
72F with Lymphedema, COPD on baseline 2 L nasal cannula, wheelchair dependent, Graves' disease, hypertension, hyperlipidemia prior breast/kidney cancer, recurrent admissions (7/25 - 8/1) for lower extremity swelling and erythema, treated with Cefazolin 2G q8 for Cellulitis, discharged on Keflex 1G q6, BCx negative MRSA swab negative, had followup with PMD who extended her Keflex to additional 2 weeks, and had completed her PO keflex 2 days ago, admitted 8/27/23 with worsening LE swelling   afebrile, WBC 11, CRP 15,     Ms To has been not taking diuretic regularly - Her mobility is limited and she finds the requirement to get up to void difficult and uncomfortable  On exam, there is bilateral venous stasis changes, however LLE is more indurated and tender  Denies fever or chills  Denies recent trauma or bug bites  Reports non compliant to diuretics     DIAGNOSIS and IMPRESSION:  #LLE Cellulitis  #BLE Lymphedema     RECOMMENDATIONS:  STOP Cefazolin 2G q8 8/27 -->    Change to PO Keflex 1000 mg po TID 8/30 -->9/3    THEN change to Chronic prophylaxis PCN  mg po twice daily x 6 months  9/4-->  continue elevation LE, compression, skin care      Reference:   Raji BELTRAN et al. Penicillin to Prevent Recurrent Leg Cellulitis. N Engl J Med 2013; 368:2447-4366. DOI: [id number]    discharge anticipated  signing off case  - please reconsult ID if needed  
72F with Lymphedema, COPD on baseline 2 L nasal cannula, wheelchair dependent, Graves' disease, hypertension, hyperlipidemia prior breast/kidney cancer, recurrent admissions (7/25 - 8/1) for lower extremity swelling and erythema, treated with Cefazolin 2G q8 for Cellulitis, discharged on Keflex 1G q6, BCx negative MRSA swab negative, had followup with PMD who extended her Keflex to additional 2 weeks, and had completed her PO keflex 2 days ago, admitted 8/27/23 with worsening LE swelling   afebrile, WBC 11, CRP 15,     Ms To has been not taking diuretic regularly - Her mobility is limited and she finds the requirement to get up to void difficult and uncomfortable  On exam, there is bilateral venous stasis changes, however LLE is more indurated and tender  Denies fever or chills  Denies recent trauma or bug bites  Reports non compliant to diuretics     DIAGNOSIS and IMPRESSION:  #LLE Cellulitis  #BLE Lymphedema     RECOMMENDATIONS:  STOP Cefazolin 2G q8 8/27 -->    Change to PO Keflex 1000 mg po TID 8/30 -->9/3    THEN change to Chronic prophylaxis PCN  mg po twice daily x 6 months  9/4-->  continue elevation LE, compression, skin care    rationale of long term prophylaxis reviewed with pt    Reference:   Raji BELTRAN et al. Penicillin to Prevent Recurrent Leg Cellulitis. N Engl J Med 2013; 368:7958-2656. DOI: 10.1056/[id number]

## 2023-09-04 LAB
CULTURE RESULTS: SIGNIFICANT CHANGE UP
SPECIMEN SOURCE: SIGNIFICANT CHANGE UP

## 2023-09-04 RX ORDER — PENICILLIN V POTASIUM 500 MG/1
1 TABLET OROPHARYNGEAL
Qty: 60 | Refills: 6
Start: 2023-09-04 | End: 2024-03-31

## 2023-09-05 ENCOUNTER — NON-APPOINTMENT (OUTPATIENT)
Age: 73
End: 2023-09-05

## 2023-09-17 NOTE — PHYSICAL THERAPY INITIAL EVALUATION ADULT - PERTINENT HX OF CURRENT PROBLEM, REHAB EVAL
72 y.o. F PMH COPD on 2 L nasal cannula at home, Graves' disease, HLD, HTN, breast cancer, kidney cancer, skin cancer all in remission presenting with a chief complaint of chest pain x2 months.  Patient states that pain radiates to her back and her mouth feels dry. Patient is also having difficulty swallowing. Patient states that she feels like she is having a choking sensation.  Patient describes symptoms as a tightness.  Patient states that she is coming in today because the symptoms are progressively getting worse.  Patient at baseline is not able to ambulate and does not know if movement makes symptoms worse.  Patient reports shortness of breath as well.  The patient's  is at bedside and reports that the patient also has had leg swelling.   reports that patient has had cellulitis in the past.  And he has noticed that there is increased redness of the leg.  Patient denies any recent illnesses, nausea, vomiting any headaches, visual changes, diarrhea, urinary symptoms. None

## 2023-09-21 PROCEDURE — 99214 OFFICE O/P EST MOD 30 MIN: CPT

## 2023-09-23 NOTE — PATIENT PROFILE ADULT - PRIMARY SOURCE OF SUPPORT/COMFORT
"Writer confirmed what name and pronouns pt would prefer at this time. At this time \"Bria\" and she/her is preferred.  " spouse

## 2023-10-24 NOTE — ED BEHAVIORAL HEALTH ASSESSMENT NOTE - REASON FOR REFERRAL
anxiety Sski Pregnancy And Lactation Text: This medication is Pregnancy Category D and isn't considered safe during pregnancy. It is excreted in breast milk.

## 2023-11-03 PROCEDURE — 99443: CPT | Mod: 95

## 2023-11-13 PROCEDURE — 99285 EMERGENCY DEPT VISIT HI MDM: CPT

## 2023-11-13 PROCEDURE — 73701 CT LOWER EXTREMITY W/DYE: CPT | Mod: MA

## 2023-11-13 PROCEDURE — 82435 ASSAY OF BLOOD CHLORIDE: CPT

## 2023-11-13 PROCEDURE — 87205 SMEAR GRAM STAIN: CPT

## 2023-11-13 PROCEDURE — 85652 RBC SED RATE AUTOMATED: CPT

## 2023-11-13 PROCEDURE — 82962 GLUCOSE BLOOD TEST: CPT

## 2023-11-13 PROCEDURE — 85025 COMPLETE CBC W/AUTO DIFF WBC: CPT

## 2023-11-13 PROCEDURE — 93005 ELECTROCARDIOGRAM TRACING: CPT

## 2023-11-13 PROCEDURE — 97110 THERAPEUTIC EXERCISES: CPT

## 2023-11-13 PROCEDURE — 94640 AIRWAY INHALATION TREATMENT: CPT

## 2023-11-13 PROCEDURE — 82947 ASSAY GLUCOSE BLOOD QUANT: CPT

## 2023-11-13 PROCEDURE — 84145 PROCALCITONIN (PCT): CPT

## 2023-11-13 PROCEDURE — 97530 THERAPEUTIC ACTIVITIES: CPT

## 2023-11-13 PROCEDURE — 85014 HEMATOCRIT: CPT

## 2023-11-13 PROCEDURE — 85018 HEMOGLOBIN: CPT

## 2023-11-13 PROCEDURE — 80048 BASIC METABOLIC PNL TOTAL CA: CPT

## 2023-11-13 PROCEDURE — 83605 ASSAY OF LACTIC ACID: CPT

## 2023-11-13 PROCEDURE — 97161 PT EVAL LOW COMPLEX 20 MIN: CPT

## 2023-11-13 PROCEDURE — 87186 SC STD MICRODIL/AGAR DIL: CPT

## 2023-11-13 PROCEDURE — 87070 CULTURE OTHR SPECIMN AEROBIC: CPT

## 2023-11-13 PROCEDURE — 84443 ASSAY THYROID STIM HORMONE: CPT

## 2023-11-13 PROCEDURE — 96374 THER/PROPH/DIAG INJ IV PUSH: CPT

## 2023-11-13 PROCEDURE — 86140 C-REACTIVE PROTEIN: CPT

## 2023-11-13 PROCEDURE — 84295 ASSAY OF SERUM SODIUM: CPT

## 2023-11-13 PROCEDURE — 87077 CULTURE AEROBIC IDENTIFY: CPT

## 2023-11-13 PROCEDURE — 93971 EXTREMITY STUDY: CPT

## 2023-11-13 PROCEDURE — 87040 BLOOD CULTURE FOR BACTERIA: CPT

## 2023-11-13 PROCEDURE — 36415 COLL VENOUS BLD VENIPUNCTURE: CPT

## 2023-11-13 PROCEDURE — 84132 ASSAY OF SERUM POTASSIUM: CPT

## 2023-11-13 PROCEDURE — 80053 COMPREHEN METABOLIC PANEL: CPT

## 2023-11-13 PROCEDURE — 85027 COMPLETE CBC AUTOMATED: CPT

## 2023-11-13 PROCEDURE — 82330 ASSAY OF CALCIUM: CPT

## 2023-11-13 PROCEDURE — 82803 BLOOD GASES ANY COMBINATION: CPT

## 2023-12-06 NOTE — ED PROVIDER NOTE - CPE EDP NEURO NORM
Patient: Royce Feliz    Procedure: Procedure(s):  CORONARY ARTERY BYPASS GRAFT X 4 , LEFT INTERNAL MAMMARY ARTERY HARVEST, LEFT SAPHENOUS ENDOSCOPIC VESSEL PROCUREMENT, DARIELA ,   EPI AORTIC ULTRASOUND  ANESTHESIA TRANSESOPHAGEAL ECHOCARDIOGRAM       Anesthesia Type:  General    Note:  Disposition: ICU            ICU Sign Out: Anesthesiologist/ICU physician sign out WAS performed   Postop Pain Control: Uneventful            Sign Out: Well controlled pain   PONV: No   Neuro/Psych:             Sign Out: PLANNED postop sedation   Airway/Respiratory:             Sign Out: AIRWAY IN SITU/Resp. Support               Airway in situ/Resp. Support: ETT                 Reason: Planned Pre-op   CV/Hemodynamics: Uneventful            Sign Out: Detailed CV status               Blood Pressure: Normal               Rate/Rhythm: Normal HR               Perfusion:  Adequate perfusion indices; Inotropes   Other NRE: NONE   DID A NON-ROUTINE EVENT OCCUR? No    Event details/Postop Comments:  Routine fast track wean.  Ventilator managed by ICU.   A sign out was given to Dr. Rosado at bedside.           Last vitals:  Vitals:    12/06/23 1415 12/06/23 1430 12/06/23 1505   BP:      Pulse: 91 91 98   Resp:      Temp:      SpO2: 97% 97% 100%       Electronically Signed By: Armida Frank MD  December 6, 2023  3:17 PM  
normal...

## 2023-12-14 PROCEDURE — 99442: CPT | Mod: 95

## 2023-12-21 NOTE — PROGRESS NOTE ADULT - PROBLEM SELECTOR PROBLEM 7
Kidney cancer, primary, with metastasis from kidney to other site, left You can access the FollowMyHealth Patient Portal offered by [facility] by registering at the following website: [email or web address]. By joining [employer]’s FollowMyHealth portal, you will also be able to view your health information using other applications (apps) compatible with our system. You can access the FollowMyHealth Patient Portal offered by [facility] by registering at the following website: [email or web address]. By joining [employer]’s FollowMyHealth portal, you will also be able to view your health information using other applications (apps) compatible with our system.

## 2024-01-03 NOTE — PATIENT PROFILE ADULT - NSPROPTRIGHTCAREGIVER_GEN_A_NUR
Patient: Burton Moore Date: 1/3/2024   : 1964 Attending: Kelsey Hardy MD   59 year old male        Chiefcomplaint: CKD3.    Subjective: stable, no new complaints.    Reviewed: Allergies, Medical History, Surgical History, Social History and Family History.    Vital Last Value 24 HourRange   Temperature 97.7 °F (36.5 °C) Temp  Min: 97.3 °F (36.3 °C)  Max: 97.7 °F (36.5 °C)   Pulse 82 Pulse  Min: 27  Max: 82   Respiratory 18 Resp  Min: 18  Max: 18   Blood Pressure (!) 143/87 BP  Min: 120/60  Max: 143/87   ArtBP   No data recorded   Pulse Oximetry 100 % SpO2  Min: 100 %  Max: 100 %     Vital Today Admitted   Weight 109.4 kg (241 lb 2.9 oz) Weight: (S)  (Bed weight needed)   Height N/A Height: 6' 1\" (185.4 cm)   BMI N/A BMI (Calculated): 32.02     Weight over the past 48 Hours:  Patient Vitals for the past 48 hrs:   Weight   24 0648 104.1 kg (229 lb 8 oz)   24 0624 108.5 kg (239 lb 3.2 oz)   24 0757 109.4 kg (241 lb 2.9 oz)          Intake/Output:    Last StoolOccurrence: 1 (24 1443)    No intake/output data recorded.    I/O last 3 completed shifts:  In: 720 [P.O.:720]  Out: 1100 [Urine:1100]      Intake/Output Summary (Last 24 hours) at 1/3/2024 1502  Last data filed at 1/3/2024 0850  Gross per 24 hour   Intake 720 ml   Output 1100 ml   Net -380 ml         Medications/Infusions:  Scheduled:   Current Facility-Administered Medications   Medication Dose Route Frequency Provider Last Rate Last Admin    [Held by provider] metFORMIN (GLUCOPHAGE) tablet 500 mg  500 mg Oral Daily with breakfast Kelsey Hardy MD        insulin glargine (LANTUS) injection 45 Units  45 Units Subcutaneous Daily Fransisco Lawson MD   45 Units at 24 0852    insulin lispro (ADMELOG, HumaLOG) injection 5 Units  5 Units Subcutaneous TID WC Fransisco Lawson MD   5 Units at 24 1432    diclofenac (VOLTAREN) 1 % gel 4 g  4 g Topical Nightly Fransisco Lawson MD   4 g at 24 2141     lidocaine (LIDOCARE) 4 % patch 2 patch  2 patch Transdermal Daily Fransisco Lawson MD   2 patch at 01/02/24 1100    gabapentin (NEURONTIN) capsule 100 mg  100 mg Oral Nightly Fransisco Lawson MD   100 mg at 01/02/24 2057    furosemide (LASIX) tablet 40 mg  40 mg Oral BID Fransisco Lawson MD   40 mg at 01/03/24 0850    insulin lispro (ADMELOG,HumaLOG) - Correction Dose   Subcutaneous Nightly Fransisco Lawson MD   2 Units at 01/02/24 2101    hydrOXYzine (ATARAX) tablet 10 mg  10 mg Oral TID Fransisco Lawson MD   10 mg at 01/03/24 1417    polyethylene glycol (MIRALAX) packet 17 g  17 g Oral Daily Fransisco Lawson MD   17 g at 01/03/24 0851    docusate sodium-sennosides (SENOKOT S) 50-8.6 MG 2 tablet  2 tablet Oral Nightly Fransisco Lawson MD   2 tablet at 01/02/24 2057    sodium chloride 0.9 % injection 2 mL  2 mL Intracatheter 2 times per day Jordan Bee MD   2 mL at 12/31/23 0923    Potassium Standard Replacement Protocol (Levels 3.5 and lower)   Does not apply See Admin Instructions Jordan Bee MD        Magnesium Standard Replacement Protocol   Does not apply See Admin Instructions Jordan Bee MD        Phosphorus Standard Replacement Protocol   Does not apply See Admin Instructions Jordan Bee MD        insulin lispro (ADMELOG,HumaLOG) - Correction Dose   Subcutaneous TID  Jordan Bee MD   4 Units at 01/03/24 1258    allopurinol (ZYLOPRIM) tablet 200 mg  200 mg Oral Daily Fátima Epperson MD   200 mg at 01/03/24 0850    aspirin (ECOTRIN) enteric coated tablet 81 mg  81 mg Oral Daily Fátima Epperson MD   81 mg at 01/03/24 0850    atorvastatin (LIPITOR) tablet 40 mg  40 mg Oral Daily Fátima Epperson MD   40 mg at 01/03/24 0850    carvedilol (COREG) tablet 25 mg  25 mg Oral BID  Fátima Epperson MD   25 mg at 01/03/24 0850    ferrous sulfate (65 mg Fe per 325 mg) tablet 325 mg  325 mg Oral Daily with breakfast Fátima Epperson MD   325 mg at  01/03/24 0849    empagliflozin (JARDIANCE) tablet 10 mg  10 mg Oral Daily Fátima Epperson MD   10 mg at 01/03/24 0850    latanoprost (XALATAN) 0.005 % ophthalmic solution 1 drop  1 drop Both Eyes Nightly Fátima Epperson MD   1 drop at 01/02/24 2058    losartan (COZAAR) tablet 25 mg  25 mg Oral Daily Fátima Epperson MD   25 mg at 01/03/24 0849    OLANZapine (ZyPREXA) tablet 15 mg  15 mg Oral QAM Fátima Epperson MD   15 mg at 01/01/24 0435    potassium CHLORIDE ER tablet 10 mEq  10 mEq Oral Daily Fátima Epperson MD   10 mEq at 01/03/24 0849    enoxaparin (LOVENOX) injection 40 mg  40 mg Subcutaneous Daily Fransisco Lawson MD   40 mg at 01/03/24 0851       Continuous Infusions:   Current Facility-Administered Medications   Medication Dose Route Frequency Provider Last Rate Last Admin       Physical Exam:  GEN: ALERT and ORIENTED, NAD  HEENT: NO PALLOR, NO ICTERUS  NECK: SUPPPLE, NO MASS, NO JVD  LUNGS: SYMMETRIC AIR ENTRY, CTA  CVS: S1 S2, NO RUB  ABDO: Soft, Not Distended, No Tenderness  EXT: ++EDEMA    LaboratoryResults:    Lab Results   Component Value Date    SODIUM 137 01/02/2024    POTASSIUM 3.8 01/02/2024    CHLORIDE 99 01/02/2024    CO2 29 01/02/2024    ANIONGAP 13 01/02/2024    BUN 53 (H) 01/02/2024    CREATININE 1.56 (H) 01/02/2024    CALCIUM 9.5 01/02/2024    URIC 5.4 10/12/2023    GLUCOSE 179 (H) 01/02/2024     12/10/2018    LIPA 175 09/25/2017    WBC 12.2 (H) 01/02/2024    HCT 32.7 (L) 01/02/2024     Lab Results   Component Value Date    HGB 10.9 (L) 01/02/2024     01/02/2024    INR 1.1 11/30/2023    PTT 27 11/30/2023    MG 2.2 01/02/2024    PHOS 5.0 (H) 01/02/2024    ALKPT 87 01/02/2024    VB12 862 12/23/2023    BILIRUBIN 0.4 01/02/2024    CRP 1.1 (H) 03/30/2023    AST 33 01/02/2024    GPT 64 (H) 01/02/2024    ALBUMIN 2.5 (L) 01/02/2024    GFRA 43 06/27/2022    GFRNA 83 01/16/2020    INTAC 30 01/02/2024    LACTA 1.3 12/23/2023    PST 15  03/28/2023    FERR 240 08/23/2021    OSMO 328 (H) 11/30/2023     Creatinine (mg/dL)   Date Value   01/02/2024 1.56 (H)   12/31/2023 1.57 (H)   12/30/2023 2.01 (H)   12/29/2023 2.78 (H)   12/28/2023 3.22 (H)     BUN (mg/dL)   Date Value   01/02/2024 53 (H)   12/31/2023 61 (H)   12/30/2023 83 (H)   12/29/2023 110 (H)   12/28/2023 98 (H)     Sodium (mmol/L)   Date Value   01/02/2024 137   12/31/2023 135   12/30/2023 140   12/29/2023 139   12/28/2023 139     Potassium (mmol/L)   Date Value   01/02/2024 3.8   12/31/2023 3.9   12/30/2023 3.1 (L)   12/29/2023 3.6   12/28/2023 3.7     HGB (g/dL)   Date Value   01/02/2024 10.9 (L)   12/31/2023 10.5 (L)   12/30/2023 10.4 (L)   12/29/2023 10.0 (L)   12/28/2023 10.9 (L)     Magnesium (mg/dL)   Date Value   01/02/2024 2.2   12/23/2023 2.2   11/30/2023 3.3 (H)   05/26/2023 2.5 (H)   04/01/2023 2.2     Phosphorus (mg/dL)   Date Value   01/02/2024 5.0 (H)   04/01/2023 5.0 (H)     Urine Panel  Lab Results   Component Value Date    BHAVIN 68 03/29/2023    URBC Negative 01/01/2024    UKET Negative 01/01/2024    USPG 1.018 01/01/2024    UPROT Negative 01/01/2024    UWBC Negative 01/01/2024    UBILI Negative 01/01/2024    UPH 6.0 01/01/2024    UBACTR NONE SEEN 11/29/2013       Diagnosis/Plan:  CKD Stage 3  HTN  Anemia  ? Proteinuria  Secondary Hyperparathyroidism  DJD  CHF  LE edema      PLAN:  He has had CKD for many years and has had Cr of 1.6-1.8 mg/dl.  Renal function is stable.   Continue same medication.  UA shows no Proteinuria.  BP is stable on current regimen.  H/H stable, will recheck.  Vitamin D 25-OH level is greater than 30.  Avoid NSAIDS.  Cardiology following.  Lasix 40 mg PO BID.  BMP, CBC in am.    Yazmin Duong MD.  Nephrology.     yes

## 2024-01-13 ENCOUNTER — INPATIENT (INPATIENT)
Facility: HOSPITAL | Age: 74
LOS: 8 days | Discharge: HOME HEALTH | DRG: 193 | End: 2024-01-22
Attending: GENERAL PRACTICE | Admitting: GENERAL PRACTICE
Payer: MEDICARE

## 2024-01-13 VITALS — HEART RATE: 80 BPM | OXYGEN SATURATION: 97 %

## 2024-01-13 DIAGNOSIS — Z98.89 OTHER SPECIFIED POSTPROCEDURAL STATES: Chronic | ICD-10-CM

## 2024-01-13 DIAGNOSIS — Z90.49 ACQUIRED ABSENCE OF OTHER SPECIFIED PARTS OF DIGESTIVE TRACT: Chronic | ICD-10-CM

## 2024-01-13 DIAGNOSIS — Z90.5 ACQUIRED ABSENCE OF KIDNEY: Chronic | ICD-10-CM

## 2024-01-13 DIAGNOSIS — R06.02 SHORTNESS OF BREATH: ICD-10-CM

## 2024-01-13 LAB
-  STAPHYLOCOCCUS EPIDERMIDIS, METHICILLIN RESISTANT: SIGNIFICANT CHANGE UP
-  STAPHYLOCOCCUS EPIDERMIDIS, METHICILLIN RESISTANT: SIGNIFICANT CHANGE UP
ALBUMIN SERPL ELPH-MCNC: 3.7 G/DL — SIGNIFICANT CHANGE UP (ref 3.3–5)
ALBUMIN SERPL ELPH-MCNC: 3.7 G/DL — SIGNIFICANT CHANGE UP (ref 3.3–5)
ALP SERPL-CCNC: 104 U/L — SIGNIFICANT CHANGE UP (ref 40–120)
ALP SERPL-CCNC: 104 U/L — SIGNIFICANT CHANGE UP (ref 40–120)
ALT FLD-CCNC: 13 U/L — SIGNIFICANT CHANGE UP (ref 10–45)
ALT FLD-CCNC: 13 U/L — SIGNIFICANT CHANGE UP (ref 10–45)
ANION GAP SERPL CALC-SCNC: 14 MMOL/L — SIGNIFICANT CHANGE UP (ref 5–17)
ANION GAP SERPL CALC-SCNC: 14 MMOL/L — SIGNIFICANT CHANGE UP (ref 5–17)
APPEARANCE UR: ABNORMAL
APPEARANCE UR: ABNORMAL
APTT BLD: 74.2 SEC — HIGH (ref 24.5–35.6)
APTT BLD: 74.2 SEC — HIGH (ref 24.5–35.6)
AST SERPL-CCNC: 12 U/L — SIGNIFICANT CHANGE UP (ref 10–40)
AST SERPL-CCNC: 12 U/L — SIGNIFICANT CHANGE UP (ref 10–40)
BACTERIA # UR AUTO: ABNORMAL /HPF
BACTERIA # UR AUTO: ABNORMAL /HPF
BASE EXCESS BLDV CALC-SCNC: 4.8 MMOL/L — HIGH (ref -2–3)
BASE EXCESS BLDV CALC-SCNC: 4.8 MMOL/L — HIGH (ref -2–3)
BASOPHILS # BLD AUTO: 0.01 K/UL — SIGNIFICANT CHANGE UP (ref 0–0.2)
BASOPHILS # BLD AUTO: 0.01 K/UL — SIGNIFICANT CHANGE UP (ref 0–0.2)
BASOPHILS NFR BLD AUTO: 0.1 % — SIGNIFICANT CHANGE UP (ref 0–2)
BASOPHILS NFR BLD AUTO: 0.1 % — SIGNIFICANT CHANGE UP (ref 0–2)
BILIRUB SERPL-MCNC: 1 MG/DL — SIGNIFICANT CHANGE UP (ref 0.2–1.2)
BILIRUB SERPL-MCNC: 1 MG/DL — SIGNIFICANT CHANGE UP (ref 0.2–1.2)
BILIRUB UR-MCNC: NEGATIVE — SIGNIFICANT CHANGE UP
BILIRUB UR-MCNC: NEGATIVE — SIGNIFICANT CHANGE UP
BUN SERPL-MCNC: 12 MG/DL — SIGNIFICANT CHANGE UP (ref 7–23)
BUN SERPL-MCNC: 12 MG/DL — SIGNIFICANT CHANGE UP (ref 7–23)
CA-I SERPL-SCNC: 1.31 MMOL/L — SIGNIFICANT CHANGE UP (ref 1.15–1.33)
CA-I SERPL-SCNC: 1.31 MMOL/L — SIGNIFICANT CHANGE UP (ref 1.15–1.33)
CALCIUM SERPL-MCNC: 9.8 MG/DL — SIGNIFICANT CHANGE UP (ref 8.4–10.5)
CALCIUM SERPL-MCNC: 9.8 MG/DL — SIGNIFICANT CHANGE UP (ref 8.4–10.5)
CAST: 1 /LPF — SIGNIFICANT CHANGE UP (ref 0–4)
CAST: 1 /LPF — SIGNIFICANT CHANGE UP (ref 0–4)
CHLORIDE BLDV-SCNC: 99 MMOL/L — SIGNIFICANT CHANGE UP (ref 96–108)
CHLORIDE BLDV-SCNC: 99 MMOL/L — SIGNIFICANT CHANGE UP (ref 96–108)
CHLORIDE SERPL-SCNC: 99 MMOL/L — SIGNIFICANT CHANGE UP (ref 96–108)
CHLORIDE SERPL-SCNC: 99 MMOL/L — SIGNIFICANT CHANGE UP (ref 96–108)
CO2 BLDV-SCNC: 33 MMOL/L — HIGH (ref 22–26)
CO2 BLDV-SCNC: 33 MMOL/L — HIGH (ref 22–26)
CO2 SERPL-SCNC: 24 MMOL/L — SIGNIFICANT CHANGE UP (ref 22–31)
CO2 SERPL-SCNC: 24 MMOL/L — SIGNIFICANT CHANGE UP (ref 22–31)
COLOR SPEC: YELLOW — SIGNIFICANT CHANGE UP
COLOR SPEC: YELLOW — SIGNIFICANT CHANGE UP
CREAT SERPL-MCNC: 0.67 MG/DL — SIGNIFICANT CHANGE UP (ref 0.5–1.3)
CREAT SERPL-MCNC: 0.67 MG/DL — SIGNIFICANT CHANGE UP (ref 0.5–1.3)
DIFF PNL FLD: NEGATIVE — SIGNIFICANT CHANGE UP
DIFF PNL FLD: NEGATIVE — SIGNIFICANT CHANGE UP
EGFR: 91 ML/MIN/1.73M2 — SIGNIFICANT CHANGE UP
EGFR: 91 ML/MIN/1.73M2 — SIGNIFICANT CHANGE UP
EOSINOPHIL # BLD AUTO: 0.31 K/UL — SIGNIFICANT CHANGE UP (ref 0–0.5)
EOSINOPHIL # BLD AUTO: 0.31 K/UL — SIGNIFICANT CHANGE UP (ref 0–0.5)
EOSINOPHIL NFR BLD AUTO: 3.2 % — SIGNIFICANT CHANGE UP (ref 0–6)
EOSINOPHIL NFR BLD AUTO: 3.2 % — SIGNIFICANT CHANGE UP (ref 0–6)
GAS PNL BLDV: 136 MMOL/L — SIGNIFICANT CHANGE UP (ref 136–145)
GAS PNL BLDV: 136 MMOL/L — SIGNIFICANT CHANGE UP (ref 136–145)
GAS PNL BLDV: SIGNIFICANT CHANGE UP
GLUCOSE BLDV-MCNC: 157 MG/DL — HIGH (ref 70–99)
GLUCOSE BLDV-MCNC: 157 MG/DL — HIGH (ref 70–99)
GLUCOSE SERPL-MCNC: 152 MG/DL — HIGH (ref 70–99)
GLUCOSE SERPL-MCNC: 152 MG/DL — HIGH (ref 70–99)
GLUCOSE UR QL: NEGATIVE MG/DL — SIGNIFICANT CHANGE UP
GLUCOSE UR QL: NEGATIVE MG/DL — SIGNIFICANT CHANGE UP
GRAM STN FLD: ABNORMAL
HCO3 BLDV-SCNC: 32 MMOL/L — HIGH (ref 22–29)
HCO3 BLDV-SCNC: 32 MMOL/L — HIGH (ref 22–29)
HCT VFR BLD CALC: 38.2 % — SIGNIFICANT CHANGE UP (ref 34.5–45)
HCT VFR BLD CALC: 38.2 % — SIGNIFICANT CHANGE UP (ref 34.5–45)
HCT VFR BLDA CALC: 37 % — SIGNIFICANT CHANGE UP (ref 34.5–46.5)
HCT VFR BLDA CALC: 37 % — SIGNIFICANT CHANGE UP (ref 34.5–46.5)
HGB BLD CALC-MCNC: 12.2 G/DL — SIGNIFICANT CHANGE UP (ref 11.7–16.1)
HGB BLD CALC-MCNC: 12.2 G/DL — SIGNIFICANT CHANGE UP (ref 11.7–16.1)
HGB BLD-MCNC: 12 G/DL — SIGNIFICANT CHANGE UP (ref 11.5–15.5)
HGB BLD-MCNC: 12 G/DL — SIGNIFICANT CHANGE UP (ref 11.5–15.5)
HMPV RNA SPEC QL NAA+PROBE: DETECTED
HMPV RNA SPEC QL NAA+PROBE: DETECTED
IMM GRANULOCYTES NFR BLD AUTO: 0.5 % — SIGNIFICANT CHANGE UP (ref 0–0.9)
IMM GRANULOCYTES NFR BLD AUTO: 0.5 % — SIGNIFICANT CHANGE UP (ref 0–0.9)
INR BLD: 1.17 RATIO — SIGNIFICANT CHANGE UP (ref 0.85–1.18)
INR BLD: 1.17 RATIO — SIGNIFICANT CHANGE UP (ref 0.85–1.18)
KETONES UR-MCNC: NEGATIVE MG/DL — SIGNIFICANT CHANGE UP
KETONES UR-MCNC: NEGATIVE MG/DL — SIGNIFICANT CHANGE UP
LACTATE BLDV-MCNC: 1.7 MMOL/L — SIGNIFICANT CHANGE UP (ref 0.5–2)
LACTATE BLDV-MCNC: 1.7 MMOL/L — SIGNIFICANT CHANGE UP (ref 0.5–2)
LEUKOCYTE ESTERASE UR-ACNC: ABNORMAL
LEUKOCYTE ESTERASE UR-ACNC: ABNORMAL
LYMPHOCYTES # BLD AUTO: 1.18 K/UL — SIGNIFICANT CHANGE UP (ref 1–3.3)
LYMPHOCYTES # BLD AUTO: 1.18 K/UL — SIGNIFICANT CHANGE UP (ref 1–3.3)
LYMPHOCYTES # BLD AUTO: 12.1 % — LOW (ref 13–44)
LYMPHOCYTES # BLD AUTO: 12.1 % — LOW (ref 13–44)
MCHC RBC-ENTMCNC: 30.1 PG — SIGNIFICANT CHANGE UP (ref 27–34)
MCHC RBC-ENTMCNC: 30.1 PG — SIGNIFICANT CHANGE UP (ref 27–34)
MCHC RBC-ENTMCNC: 31.4 GM/DL — LOW (ref 32–36)
MCHC RBC-ENTMCNC: 31.4 GM/DL — LOW (ref 32–36)
MCV RBC AUTO: 95.7 FL — SIGNIFICANT CHANGE UP (ref 80–100)
MCV RBC AUTO: 95.7 FL — SIGNIFICANT CHANGE UP (ref 80–100)
METHOD TYPE: SIGNIFICANT CHANGE UP
METHOD TYPE: SIGNIFICANT CHANGE UP
MONOCYTES # BLD AUTO: 1.22 K/UL — HIGH (ref 0–0.9)
MONOCYTES # BLD AUTO: 1.22 K/UL — HIGH (ref 0–0.9)
MONOCYTES NFR BLD AUTO: 12.5 % — SIGNIFICANT CHANGE UP (ref 2–14)
MONOCYTES NFR BLD AUTO: 12.5 % — SIGNIFICANT CHANGE UP (ref 2–14)
NEUTROPHILS # BLD AUTO: 6.99 K/UL — SIGNIFICANT CHANGE UP (ref 1.8–7.4)
NEUTROPHILS # BLD AUTO: 6.99 K/UL — SIGNIFICANT CHANGE UP (ref 1.8–7.4)
NEUTROPHILS NFR BLD AUTO: 71.6 % — SIGNIFICANT CHANGE UP (ref 43–77)
NEUTROPHILS NFR BLD AUTO: 71.6 % — SIGNIFICANT CHANGE UP (ref 43–77)
NITRITE UR-MCNC: POSITIVE
NITRITE UR-MCNC: POSITIVE
NRBC # BLD: 0 /100 WBCS — SIGNIFICANT CHANGE UP (ref 0–0)
NRBC # BLD: 0 /100 WBCS — SIGNIFICANT CHANGE UP (ref 0–0)
NT-PROBNP SERPL-SCNC: 186 PG/ML — SIGNIFICANT CHANGE UP (ref 0–300)
NT-PROBNP SERPL-SCNC: 186 PG/ML — SIGNIFICANT CHANGE UP (ref 0–300)
PCO2 BLDV: 56 MMHG — HIGH (ref 39–42)
PCO2 BLDV: 56 MMHG — HIGH (ref 39–42)
PH BLDV: 7.36 — SIGNIFICANT CHANGE UP (ref 7.32–7.43)
PH BLDV: 7.36 — SIGNIFICANT CHANGE UP (ref 7.32–7.43)
PH UR: 5.5 — SIGNIFICANT CHANGE UP (ref 5–8)
PH UR: 5.5 — SIGNIFICANT CHANGE UP (ref 5–8)
PLATELET # BLD AUTO: 153 K/UL — SIGNIFICANT CHANGE UP (ref 150–400)
PLATELET # BLD AUTO: 153 K/UL — SIGNIFICANT CHANGE UP (ref 150–400)
PO2 BLDV: 46 MMHG — HIGH (ref 25–45)
PO2 BLDV: 46 MMHG — HIGH (ref 25–45)
POTASSIUM BLDV-SCNC: 3.9 MMOL/L — SIGNIFICANT CHANGE UP (ref 3.5–5.1)
POTASSIUM BLDV-SCNC: 3.9 MMOL/L — SIGNIFICANT CHANGE UP (ref 3.5–5.1)
POTASSIUM SERPL-MCNC: 4.1 MMOL/L — SIGNIFICANT CHANGE UP (ref 3.5–5.3)
POTASSIUM SERPL-MCNC: 4.1 MMOL/L — SIGNIFICANT CHANGE UP (ref 3.5–5.3)
POTASSIUM SERPL-SCNC: 4.1 MMOL/L — SIGNIFICANT CHANGE UP (ref 3.5–5.3)
POTASSIUM SERPL-SCNC: 4.1 MMOL/L — SIGNIFICANT CHANGE UP (ref 3.5–5.3)
PROT SERPL-MCNC: 6.9 G/DL — SIGNIFICANT CHANGE UP (ref 6–8.3)
PROT SERPL-MCNC: 6.9 G/DL — SIGNIFICANT CHANGE UP (ref 6–8.3)
PROT UR-MCNC: NEGATIVE MG/DL — SIGNIFICANT CHANGE UP
PROT UR-MCNC: NEGATIVE MG/DL — SIGNIFICANT CHANGE UP
PROTHROM AB SERPL-ACNC: 12.2 SEC — SIGNIFICANT CHANGE UP (ref 9.5–13)
PROTHROM AB SERPL-ACNC: 12.2 SEC — SIGNIFICANT CHANGE UP (ref 9.5–13)
RAPID RVP RESULT: DETECTED
RAPID RVP RESULT: DETECTED
RBC # BLD: 3.99 M/UL — SIGNIFICANT CHANGE UP (ref 3.8–5.2)
RBC # BLD: 3.99 M/UL — SIGNIFICANT CHANGE UP (ref 3.8–5.2)
RBC # FLD: 14.7 % — HIGH (ref 10.3–14.5)
RBC # FLD: 14.7 % — HIGH (ref 10.3–14.5)
RBC CASTS # UR COMP ASSIST: 3 /HPF — SIGNIFICANT CHANGE UP (ref 0–4)
RBC CASTS # UR COMP ASSIST: 3 /HPF — SIGNIFICANT CHANGE UP (ref 0–4)
SAO2 % BLDV: 75.6 % — SIGNIFICANT CHANGE UP (ref 67–88)
SAO2 % BLDV: 75.6 % — SIGNIFICANT CHANGE UP (ref 67–88)
SARS-COV-2 RNA SPEC QL NAA+PROBE: SIGNIFICANT CHANGE UP
SARS-COV-2 RNA SPEC QL NAA+PROBE: SIGNIFICANT CHANGE UP
SODIUM SERPL-SCNC: 137 MMOL/L — SIGNIFICANT CHANGE UP (ref 135–145)
SODIUM SERPL-SCNC: 137 MMOL/L — SIGNIFICANT CHANGE UP (ref 135–145)
SP GR SPEC: 1.02 — SIGNIFICANT CHANGE UP (ref 1–1.03)
SP GR SPEC: 1.02 — SIGNIFICANT CHANGE UP (ref 1–1.03)
SPECIMEN SOURCE: SIGNIFICANT CHANGE UP
SQUAMOUS # UR AUTO: 2 /HPF — SIGNIFICANT CHANGE UP (ref 0–5)
SQUAMOUS # UR AUTO: 2 /HPF — SIGNIFICANT CHANGE UP (ref 0–5)
TROPONIN T, HIGH SENSITIVITY RESULT: 24 NG/L — SIGNIFICANT CHANGE UP (ref 0–51)
TROPONIN T, HIGH SENSITIVITY RESULT: 24 NG/L — SIGNIFICANT CHANGE UP (ref 0–51)
TROPONIN T, HIGH SENSITIVITY RESULT: 27 NG/L — SIGNIFICANT CHANGE UP (ref 0–51)
TROPONIN T, HIGH SENSITIVITY RESULT: 27 NG/L — SIGNIFICANT CHANGE UP (ref 0–51)
UROBILINOGEN FLD QL: 0.2 MG/DL — SIGNIFICANT CHANGE UP (ref 0.2–1)
UROBILINOGEN FLD QL: 0.2 MG/DL — SIGNIFICANT CHANGE UP (ref 0.2–1)
WBC # BLD: 9.76 K/UL — SIGNIFICANT CHANGE UP (ref 3.8–10.5)
WBC # BLD: 9.76 K/UL — SIGNIFICANT CHANGE UP (ref 3.8–10.5)
WBC # FLD AUTO: 9.76 K/UL — SIGNIFICANT CHANGE UP (ref 3.8–10.5)
WBC # FLD AUTO: 9.76 K/UL — SIGNIFICANT CHANGE UP (ref 3.8–10.5)
WBC UR QL: 96 /HPF — HIGH (ref 0–5)
WBC UR QL: 96 /HPF — HIGH (ref 0–5)

## 2024-01-13 PROCEDURE — 71250 CT THORAX DX C-: CPT | Mod: 26

## 2024-01-13 PROCEDURE — 99291 CRITICAL CARE FIRST HOUR: CPT | Mod: GC

## 2024-01-13 PROCEDURE — 71045 X-RAY EXAM CHEST 1 VIEW: CPT | Mod: 26

## 2024-01-13 RX ORDER — CEFTRIAXONE 500 MG/1
1000 INJECTION, POWDER, FOR SOLUTION INTRAMUSCULAR; INTRAVENOUS ONCE
Refills: 0 | Status: COMPLETED | OUTPATIENT
Start: 2024-01-13 | End: 2024-01-13

## 2024-01-13 RX ORDER — HYDRALAZINE HCL 50 MG
50 TABLET ORAL THREE TIMES A DAY
Refills: 0 | Status: DISCONTINUED | OUTPATIENT
Start: 2024-01-13 | End: 2024-01-22

## 2024-01-13 RX ORDER — IPRATROPIUM/ALBUTEROL SULFATE 18-103MCG
3 AEROSOL WITH ADAPTER (GRAM) INHALATION ONCE
Refills: 0 | Status: COMPLETED | OUTPATIENT
Start: 2024-01-13 | End: 2024-01-13

## 2024-01-13 RX ORDER — HEPARIN SODIUM 5000 [USP'U]/ML
5000 INJECTION INTRAVENOUS; SUBCUTANEOUS EVERY 12 HOURS
Refills: 0 | Status: DISCONTINUED | OUTPATIENT
Start: 2024-01-13 | End: 2024-01-22

## 2024-01-13 RX ORDER — IPRATROPIUM/ALBUTEROL SULFATE 18-103MCG
3 AEROSOL WITH ADAPTER (GRAM) INHALATION EVERY 6 HOURS
Refills: 0 | Status: DISCONTINUED | OUTPATIENT
Start: 2024-01-13 | End: 2024-01-22

## 2024-01-13 RX ORDER — FUROSEMIDE 40 MG
40 TABLET ORAL DAILY
Refills: 0 | Status: DISCONTINUED | OUTPATIENT
Start: 2024-01-13 | End: 2024-01-13

## 2024-01-13 RX ORDER — ATORVASTATIN CALCIUM 80 MG/1
40 TABLET, FILM COATED ORAL AT BEDTIME
Refills: 0 | Status: DISCONTINUED | OUTPATIENT
Start: 2024-01-13 | End: 2024-01-22

## 2024-01-13 RX ORDER — ALBUTEROL 90 UG/1
2 AEROSOL, METERED ORAL EVERY 6 HOURS
Refills: 0 | Status: DISCONTINUED | OUTPATIENT
Start: 2024-01-13 | End: 2024-01-22

## 2024-01-13 RX ORDER — OLANZAPINE 15 MG/1
10 TABLET, FILM COATED ORAL AT BEDTIME
Refills: 0 | Status: DISCONTINUED | OUTPATIENT
Start: 2024-01-13 | End: 2024-01-22

## 2024-01-13 RX ORDER — DULOXETINE HYDROCHLORIDE 30 MG/1
60 CAPSULE, DELAYED RELEASE ORAL DAILY
Refills: 0 | Status: DISCONTINUED | OUTPATIENT
Start: 2024-01-13 | End: 2024-01-22

## 2024-01-13 RX ORDER — CEFTRIAXONE 500 MG/1
1000 INJECTION, POWDER, FOR SOLUTION INTRAMUSCULAR; INTRAVENOUS EVERY 24 HOURS
Refills: 0 | Status: DISCONTINUED | OUTPATIENT
Start: 2024-01-13 | End: 2024-01-14

## 2024-01-13 RX ORDER — DEXAMETHASONE 0.5 MG/5ML
10 ELIXIR ORAL ONCE
Refills: 0 | Status: COMPLETED | OUTPATIENT
Start: 2024-01-13 | End: 2024-01-13

## 2024-01-13 RX ORDER — MONTELUKAST 4 MG/1
10 TABLET, CHEWABLE ORAL DAILY
Refills: 0 | Status: DISCONTINUED | OUTPATIENT
Start: 2024-01-13 | End: 2024-01-22

## 2024-01-13 RX ORDER — AMLODIPINE BESYLATE 2.5 MG/1
5 TABLET ORAL DAILY
Refills: 0 | Status: DISCONTINUED | OUTPATIENT
Start: 2024-01-13 | End: 2024-01-13

## 2024-01-13 RX ORDER — LEVOTHYROXINE SODIUM 125 MCG
125 TABLET ORAL DAILY
Refills: 0 | Status: DISCONTINUED | OUTPATIENT
Start: 2024-01-13 | End: 2024-01-22

## 2024-01-13 RX ORDER — FUROSEMIDE 40 MG
40 TABLET ORAL DAILY
Refills: 0 | Status: DISCONTINUED | OUTPATIENT
Start: 2024-01-13 | End: 2024-01-16

## 2024-01-13 RX ORDER — AZITHROMYCIN 500 MG/1
500 TABLET, FILM COATED ORAL ONCE
Refills: 0 | Status: COMPLETED | OUTPATIENT
Start: 2024-01-13 | End: 2024-01-13

## 2024-01-13 RX ORDER — ASPIRIN/CALCIUM CARB/MAGNESIUM 324 MG
81 TABLET ORAL DAILY
Refills: 0 | Status: DISCONTINUED | OUTPATIENT
Start: 2024-01-13 | End: 2024-01-22

## 2024-01-13 RX ORDER — TIOTROPIUM BROMIDE 18 UG/1
2 CAPSULE ORAL; RESPIRATORY (INHALATION) DAILY
Refills: 0 | Status: DISCONTINUED | OUTPATIENT
Start: 2024-01-13 | End: 2024-01-17

## 2024-01-13 RX ORDER — GABAPENTIN 400 MG/1
100 CAPSULE ORAL
Refills: 0 | Status: DISCONTINUED | OUTPATIENT
Start: 2024-01-13 | End: 2024-01-22

## 2024-01-13 RX ORDER — LOSARTAN POTASSIUM 100 MG/1
100 TABLET, FILM COATED ORAL DAILY
Refills: 0 | Status: DISCONTINUED | OUTPATIENT
Start: 2024-01-13 | End: 2024-01-22

## 2024-01-13 RX ADMIN — Medication 20 MILLIGRAM(S): at 23:48

## 2024-01-13 RX ADMIN — Medication 3 MILLILITER(S): at 20:22

## 2024-01-13 RX ADMIN — ATORVASTATIN CALCIUM 40 MILLIGRAM(S): 80 TABLET, FILM COATED ORAL at 23:47

## 2024-01-13 RX ADMIN — Medication 600 MILLIGRAM(S): at 23:47

## 2024-01-13 RX ADMIN — Medication 20 MILLIGRAM(S): at 13:41

## 2024-01-13 RX ADMIN — CEFTRIAXONE 100 MILLIGRAM(S): 500 INJECTION, POWDER, FOR SOLUTION INTRAMUSCULAR; INTRAVENOUS at 02:38

## 2024-01-13 RX ADMIN — GABAPENTIN 100 MILLIGRAM(S): 400 CAPSULE ORAL at 23:47

## 2024-01-13 RX ADMIN — Medication 3 MILLILITER(S): at 01:41

## 2024-01-13 RX ADMIN — OLANZAPINE 10 MILLIGRAM(S): 15 TABLET, FILM COATED ORAL at 23:47

## 2024-01-13 RX ADMIN — Medication 3 MILLILITER(S): at 01:40

## 2024-01-13 RX ADMIN — Medication 81 MILLIGRAM(S): at 13:41

## 2024-01-13 RX ADMIN — Medication 102 MILLIGRAM(S): at 01:41

## 2024-01-13 RX ADMIN — HEPARIN SODIUM 5000 UNIT(S): 5000 INJECTION INTRAVENOUS; SUBCUTANEOUS at 23:47

## 2024-01-13 RX ADMIN — TIOTROPIUM BROMIDE 2 PUFF(S): 18 CAPSULE ORAL; RESPIRATORY (INHALATION) at 14:09

## 2024-01-13 RX ADMIN — Medication 3 MILLILITER(S): at 01:20

## 2024-01-13 RX ADMIN — AZITHROMYCIN 255 MILLIGRAM(S): 500 TABLET, FILM COATED ORAL at 03:20

## 2024-01-13 RX ADMIN — Medication 3 MILLILITER(S): at 14:09

## 2024-01-13 RX ADMIN — Medication 3 MILLILITER(S): at 23:48

## 2024-01-13 RX ADMIN — MONTELUKAST 10 MILLIGRAM(S): 4 TABLET, CHEWABLE ORAL at 13:41

## 2024-01-13 NOTE — CONSULT NOTE ADULT - SUBJECTIVE AND OBJECTIVE BOX
CHIEF COMPLAINT: SOB     pulmonary consultation : SOB , Acute COPD  exacerbation , hypoxic in acute respiratory Failure , date of service is 2024   73-year-old          history of COPD on 2L NC at home, hyperthyroid, breast CA, asthma, CHF, chronic lymphedema, wheelchair bound,         presenting with difficulty breathing.     pt t has had cough and difficulty breathing for the past couple of days,  then had significant trouble breathing,      was found by EMS to be hypoxic to 68% on 2 L nasal cannula.     pt    was  placed on CPAP by EMS, which improved oxygen saturation.      pt  denies chest pain, syncope, fevers. (2024 09:52)    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      History of carpal tunnel release  right wrist      History of parathyroidectomy      S/P laminectomy  now bed and wheelchair ridden with severe pain          FAMILY HISTORY:  Family history of myocardial infarction  mother  at age 67 and father at age 67 of MI's    Family history of hypertension  mother and father    Family history of diabetes mellitus (Sibling)  siblings    Family history of heart disease (Sibling)  brother has a PPM    Family history of thyroid cancer (Child)  daughter has thyroid cancer    Review of Systems: REVIEW OF SYSTEMS:  CONSTITUTIONAL: No fever,  no  weight loss  ENT:  No  tinnitus,   no   vertigo  NECK: No pain or stiffness  RESPIRATORY: + cough, +wheezing, chills or hemoptysis;    +  Shortness of Breath  CARDIOVASCULAR: No chest pain, palpitations, dizziness  GASTROINTESTINAL: No abdominal or epigastric pain. No nausea, vomiting, or hematemesis; No diarrhea  No melena or hematochezia.  GENITOURINARY: No dysuria, frequency, hematuria, or incontinence  NEUROLOGICAL: No headaches  SKIN: No itching,  no   rash  LYMPH Nodes: No enlarged glands  ENDOCRINE: No heat or cold intolerance  MUSCULOSKELETAL: No joint pain or swelling, lymphedema   PSYCHIATRIC: No depression, anxiety  HEME/LYMPH: No easy bruising, or bleeding gums  ALLERGY AND IMMUNOLOGIC: No hives or eczema        OBJECTIVE:  Vital Signs Last 24 Hrs  T(C): 37.2 (2024 13:58), Max: 38.3 (2024 01:15)  T(F): 99 (2024 13:58), Max: 100.9 (2024 01:15)  HR: 78 (2024 13:58) (71 - 85)  BP: 164/76 (2024 13:58) (126/56 - 164/76)  BP(mean): 83 (2024 09:26) (77 - 83)  RR: 20 (2024 13:58) (18 - 22)  SpO2: 93% (2024 13:58) (93% - 100%)    Parameters below as of 2024 13:58  Patient On (Oxygen Delivery Method): nasal cannula  O2 Flow (L/min): 3        HOSPITAL MEDICATIONS:   albuterol    90 MICROgram(s) HFA Inhaler 2 Puff(s) Inhalation every 6 hours  albuterol/ipratropium for Nebulization 3 milliLiter(s) Nebulizer every 6 hours  aspirin enteric coated 81 milliGRAM(s) Oral daily  atorvastatin 40 milliGRAM(s) Oral at bedtime  busPIRone 20 milliGRAM(s) Oral <User Schedule>  cefTRIAXone   IVPB 1000 milliGRAM(s) IV Intermittent every 24 hours  DULoxetine 60 milliGRAM(s) Oral daily  furosemide   Injectable 40 milliGRAM(s) IV Push daily  gabapentin 100 milliGRAM(s) Oral two times a day  guaiFENesin  milliGRAM(s) Oral every 12 hours  heparin   Injectable 5000 Unit(s) SubCutaneous every 12 hours  hydrALAZINE 50 milliGRAM(s) Oral three times a day  levothyroxine 125 MICROGram(s) Oral daily  losartan 100 milliGRAM(s) Oral daily  methylPREDNISolone sodium succinate Injectable 20 milliGRAM(s) IV Push three times a day  montelukast 10 milliGRAM(s) Oral daily  OLANZapine 10 milliGRAM(s) Oral at bedtime  propranolol 20 milliGRAM(s) Oral two times a day  tiotropium 2.5 MICROgram(s) Inhaler 2 Puff(s) Inhalation daily    Grapes (Hives)  No Known Drug Allergies  Peaches (Hives)  shellfish (Hives)        PHYSICAL EXAM:Daily   morbidly obese female on 3 liter nasal 02 saturation is 93%  Daily   HEENT:     + NCAT  + EOMI  - Conjuctival edema   - Icterus   - Thrush   - ETT  - NGT/OGT  Neck:         + FROM    + JVD     - Nodes     - Masses    + Mid-line trachea   - Tracheostomy  Chest:       kyphotic deformities   Lungs:          + CTA   + Rhonchi    - Rales    + Wheezing     - Decreased BS   - Dullness R L  Cardiac:       + S1 + S2    + RRR   - Irregular   - S3  - S4    - Murmurs   - Rub   - Hamman’s sign   Abdomen:    + BS     + Soft    + Non-tender     - Distended    - Organomegaly  - PEG  Extremities:   - Cyanosis U/L   - Clubbing  U/L  + LE/UE Edema   + Capillary refill    + Pulses   Neuro:        + Awake   +  Alert   - Confused   - Lethargic   - Sedated   + Generalized Weakness  Skin:        - Rashes    - Erythema   + Normal incisions   + IV sites intact  - Sacral decubitus    LABS:                        12.0   9.76  )-----------( 153      ( 2024 01:21 )             38.2         137  |  99  |  12  ----------------------------<  152<H>  4.1   |  24  |  0.67    Ca    9.8      2024 01:21    TPro  6.9  /  Alb  3.7  /  TBili  1.0  /  DBili  x   /  AST  12  /  ALT  13  /  AlkPhos  104  -    PT/INR - ( 2024 01:21 )   PT: 12.2 sec;   INR: 1.17 ratio         PTT - ( 2024 01:21 )  PTT:74.2 sec  LIVER FUNCTIONS - ( 2024 01:21 )  Alb: 3.7 g/dL / Pro: 6.9 g/dL / ALK PHOS: 104 U/L / ALT: 13 U/L / AST: 12 U/L / GGT: x            Venous Blood Gas:   @ 01:21  7.36/56/46/32/75.6  VBG Lactate: 1.7        LIVER FUNCTIONS - ( 2024 01:21 )  Alb: 3.7 g/dL / Pro: 6.9 g/dL / ALK PHOS: 104 U/L / ALT: 13 U/L / AST: 12 U/L / GGT: x             RADIOLOGY:  X Reviewed and interpreted by me Loculated left pleural effusion , cardiomegaly

## 2024-01-13 NOTE — ED ADULT NURSE NOTE - OBJECTIVE STATEMENT
· HPI Objective Statement: Patient is a 73-year-old history of COPD on 2L NC at home, hyperthyroid, breast CA, asthma, CHF, chronic lymphedema, wheelchair bound, presenting with difficulty breathing.  Patient has had cough and difficulty breathing for the past couple of days, today had significant trouble breathing, was found by EMS to be hypoxic to 68% on 2 L nasal cannula.  Patient placed on CPAP by EMS, which improved oxygen saturation.  Patient denies chest pain, syncope, fevers.

## 2024-01-13 NOTE — CONSULT NOTE ADULT - SUBJECTIVE AND OBJECTIVE BOX
CHIEF COMPLAINT: sob    HISTORY OF PRESENT ILLNESS:  73-year-old history of COPD on 2L NC at home, hyperthyroid, breast CA, asthma, CHF, chronic lymphedema, wheelchair bound, presenting with difficulty breathing.  Patient has had cough and difficulty breathing for the past couple of days, today had significant trouble breathing, was found by EMS to be hypoxic to 68% on 2 L nasal cannula.  Patient placed on CPAP by EMS, which improved oxygen saturation.  Patient denies chest pain, syncope, fevers.      Allergies    Grapes (Hives)  No Known Drug Allergies  Peaches (Hives)  shellfish (Hives)    Intolerances    	    MEDICATIONS:    see med rec              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      History of carpal tunnel release  right wrist      History of parathyroidectomy      S/P laminectomy  now bed and wheelchair ridden with severe pain          FAMILY HISTORY:  Family history of myocardial infarction  mother  at age 67 and father at age 67 of MI's    Family history of hypertension  mother and father    Family history of diabetes mellitus (Sibling)  siblings    Family history of heart disease (Sibling)  brother has a PPM    Family history of thyroid cancer (Child)  daughter has thyroid cancer        SOCIAL HISTORY:    non smoker, indep in adl, wheelchair dependent      REVIEW OF SYSTEMS:  See HPI, otherwise complete 10 point review of systems negative    [ ] All others negative	      PHYSICAL EXAM:  T(C): 36.7 (24 @ 06:02), Max: 38.3 (24 @ 01:15)  HR: 77 (24 @ 06:02) (77 - 85)  BP: 144/51 (24 @ 06:02) (126/56 - 144/51)  RR: 18 (24 @ 06:02) (18 - 22)  SpO2: 99% (24 @ 06:02) (97% - 100%)  Wt(kg): --  I&O's Summary      Appearance: No Acute Distress	  HEENT:  Normal oral mucosa, PERRL, EOMI	  Cardiovascular: Normal S1 S2, No JVD, No murmurs/rubs/gallops  Respiratory: Lungs clear to auscultation bilaterally  Gastrointestinal:  Soft, Non-tender, + BS	  Skin: No rashes, No ecchymoses, No cyanosis	  Neurologic: Non-focal  Extremities: No clubbing, cyanosis or edema  Vascular: Peripheral pulses palpable 2+ bilaterally  Psychiatry: A & O x 3, Mood & affect appropriate    Laboratory Data:	 	    CBC Full  -  ( 2024 01:21 )  WBC Count : 9.76 K/uL  Hemoglobin : 12.0 g/dL  Hematocrit : 38.2 %  Platelet Count - Automated : 153 K/uL  Mean Cell Volume : 95.7 fl  Mean Cell Hemoglobin : 30.1 pg  Mean Cell Hemoglobin Concentration : 31.4 gm/dL  Auto Neutrophil # : 6.99 K/uL  Auto Lymphocyte # : 1.18 K/uL  Auto Monocyte # : 1.22 K/uL  Auto Eosinophil # : 0.31 K/uL  Auto Basophil # : 0.01 K/uL  Auto Neutrophil % : 71.6 %  Auto Lymphocyte % : 12.1 %  Auto Monocyte % : 12.5 %  Auto Eosinophil % : 3.2 %  Auto Basophil % : 0.1 %        137  |  99  |  12  ----------------------------<  152<H>  4.1   |  24  |  0.67    Ca    9.8      2024 01:21    TPro  6.9  /  Alb  3.7  /  TBili  1.0  /  DBili  x   /  AST  12  /  ALT  13  /  AlkPhos  104        proBNP:   Lipid Profile:   HgA1c:   TSH:       CARDIAC MARKERS:            Interpretation of Telemetry: 	    ECG:  	  RADIOLOGY:  OTHER: 	    PREVIOUS DIAGNOSTIC TESTING:    [ ] Echocardiogram:  [ ] Catheterization:  [ ] Stress Test:  	    Assessment:  73-year-old history of COPD on 2L NC at home, hyperthyroid, breast CA, asthma, CHF, chronic lymphedema, wheelchair bound, presenting with difficulty breathing.  Patient has had cough and difficulty breathing for the past couple of days, today had significant trouble breathing, was found by EMS to be hypoxic to 68% on 2 L nasal cannula.  Patient placed on CPAP by EMS, which improved oxygen saturation.  Patient denies chest pain, syncope, fevers.    Recs:  cardiac stable  dyspnea likely in setting of hmpv, possible small left pleff on cxr --> obtain ct chest non-contrast  gentle diuresis with lasix 40mg iv qd  pulmonary consult  steroids and bronchodilators per pulmonary  check procalcitonin   obtain echo  supplemental o2, wean as able  check le giovanni dopplers  dvt ppx      Advanced care planning forms were discussed. Code status including forceful chest compressions, defibrillation and intubation were discussed. The risks benefits and alternatives to pertinent cardiac procedures and interventions were discussed in detail and all questions were answered. Duration: 15 minutes.  Greater than 90 minutes spent on total encounter; more than 50% of the visit was spent counseling and/or coordinating care by the attending physician.   	  Juan Salcedo MD   Cardiovascular Diseases  [phone]     CHIEF COMPLAINT: sob    HISTORY OF PRESENT ILLNESS:  73-year-old history of COPD on 2L NC at home, hyperthyroid, breast CA, asthma, CHF, chronic lymphedema, wheelchair bound, presenting with difficulty breathing.  Patient has had cough and difficulty breathing for the past couple of days, today had significant trouble breathing, was found by EMS to be hypoxic to 68% on 2 L nasal cannula.  Patient placed on CPAP by EMS, which improved oxygen saturation.  Patient denies chest pain, syncope, fevers.      Allergies    Grapes (Hives)  No Known Drug Allergies  Peaches (Hives)  shellfish (Hives)    Intolerances    	    MEDICATIONS:    see med rec              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      History of carpal tunnel release  right wrist      History of parathyroidectomy      S/P laminectomy  now bed and wheelchair ridden with severe pain          FAMILY HISTORY:  Family history of myocardial infarction  mother  at age 67 and father at age 67 of MI's    Family history of hypertension  mother and father    Family history of diabetes mellitus (Sibling)  siblings    Family history of heart disease (Sibling)  brother has a PPM    Family history of thyroid cancer (Child)  daughter has thyroid cancer        SOCIAL HISTORY:    non smoker, indep in adl, wheelchair dependent      REVIEW OF SYSTEMS:  See HPI, otherwise complete 10 point review of systems negative    [ ] All others negative	      PHYSICAL EXAM:  T(C): 36.7 (24 @ 06:02), Max: 38.3 (24 @ 01:15)  HR: 77 (24 @ 06:02) (77 - 85)  BP: 144/51 (24 @ 06:02) (126/56 - 144/51)  RR: 18 (24 @ 06:02) (18 - 22)  SpO2: 99% (24 @ 06:02) (97% - 100%)  Wt(kg): --  I&O's Summary      Appearance: No Acute Distress	  HEENT:  Normal oral mucosa, PERRL, EOMI	  Cardiovascular: Normal S1 S2, No JVD, No murmurs/rubs/gallops  Respiratory: Lungs clear to auscultation bilaterally  Gastrointestinal:  Soft, Non-tender, + BS	  Skin: No rashes, No ecchymoses, No cyanosis	  Neurologic: Non-focal  Extremities: No clubbing, cyanosis or edema  Vascular: Peripheral pulses palpable 2+ bilaterally  Psychiatry: A & O x 3, Mood & affect appropriate    Laboratory Data:	 	    CBC Full  -  ( 2024 01:21 )  WBC Count : 9.76 K/uL  Hemoglobin : 12.0 g/dL  Hematocrit : 38.2 %  Platelet Count - Automated : 153 K/uL  Mean Cell Volume : 95.7 fl  Mean Cell Hemoglobin : 30.1 pg  Mean Cell Hemoglobin Concentration : 31.4 gm/dL  Auto Neutrophil # : 6.99 K/uL  Auto Lymphocyte # : 1.18 K/uL  Auto Monocyte # : 1.22 K/uL  Auto Eosinophil # : 0.31 K/uL  Auto Basophil # : 0.01 K/uL  Auto Neutrophil % : 71.6 %  Auto Lymphocyte % : 12.1 %  Auto Monocyte % : 12.5 %  Auto Eosinophil % : 3.2 %  Auto Basophil % : 0.1 %        137  |  99  |  12  ----------------------------<  152<H>  4.1   |  24  |  0.67    Ca    9.8      2024 01:21    TPro  6.9  /  Alb  3.7  /  TBili  1.0  /  DBili  x   /  AST  12  /  ALT  13  /  AlkPhos  104        proBNP:   Lipid Profile:   HgA1c:   TSH:       CARDIAC MARKERS:            Interpretation of Telemetry: 	    ECG:  	  RADIOLOGY:  OTHER: 	    PREVIOUS DIAGNOSTIC TESTING:    [ ] Echocardiogram:  [ ] Catheterization:  [ ] Stress Test:  	    Assessment:  73-year-old history of COPD on 2L NC at home, hyperthyroid, breast CA, asthma, CHF, chronic lymphedema, wheelchair bound, presenting with difficulty breathing.  Patient has had cough and difficulty breathing for the past couple of days, today had significant trouble breathing, was found by EMS to be hypoxic to 68% on 2 L nasal cannula.  Patient placed on CPAP by EMS, which improved oxygen saturation.  Patient denies chest pain, syncope, fevers.    Recs:  cardiac stable  dyspnea likely in setting of hmpv, possible small left pleff on cxr --> obtain ct chest non-contrast  gentle diuresis with lasix 40mg iv qd  pulmonary consult  steroids and bronchodilators per pulmonary  check procalcitonin   obtain echo  supplemental o2, wean as able  check le giovanni dopplers  dvt ppx      Advanced care planning forms were discussed. Code status including forceful chest compressions, defibrillation and intubation were discussed. The risks benefits and alternatives to pertinent cardiac procedures and interventions were discussed in detail and all questions were answered. Duration: 15 minutes.  Greater than 90 minutes spent on total encounter; more than 50% of the visit was spent counseling and/or coordinating care by the attending physician.   	  Juan Salcedo MD   Cardiovascular Diseases  [phone]

## 2024-01-13 NOTE — CONSULT NOTE ADULT - ASSESSMENT
Assessment and recommendation :  Acute hypoxic respiratory Failure on 02 on top of chronic   keep 02 saturation > 87%  Acute COPD exacerbation  H/O graves disease   S/P left nephrectomy for renal cell carcinoma   Morbid obese and NOEMY   bilateral chronic lymphedema  S/P cervical laminectomy and cage placement   wheel chair bound and home bound   chronic pain syndrome   solumedrol 30 mg Q 8 hrs   Duoneb q 6 hrs   continue Rocephin  DVT and GI prophylaxis   spend 70 minutes on consultation and discussion with PCP > 50% counselling the patient

## 2024-01-13 NOTE — H&P ADULT - ASSESSMENT
73-year-old          history of COPD on 2L NC at home, hyperthyroid, breast CA, asthma, CHF, chronic lymphedema, wheelchair bound,         presenting with difficulty breathing.     pt t has had cough and difficulty breathing for the past couple of days,  then had significant trouble breathing,      was found by EMS to be hypoxic to 68% on 2 L nasal cannula.  /  was  placed on CPAP by EMS, which improved oxygen sat/ /  denies chest pain, syncope, fevers.      incomplete  73-year-old          h/o  COPD on 2L NC at home, hypothyroid,  breast CA, asthma, CHF, chronic lymphedema, wheelchair bound,         presenting with difficulty breathing./   had cough and difficulty breathing for the past couple of days,        then had significant trouble breathing, . had  worsened     was found by EMS to be hypoxic to 68% on 2 L nasal cannula.  /  was  placed on CPAP by EMS, which improved oxygen sat/ /  denies chest pain, syncope, fevers.      admitted wt sob    acute  copd  exacerbation.  on home oxygen        on  prednisone/  Proventil   spiriva        cxr.  no  pna     h/o  c/c diastolic  cgf   Ca  breast    HTN.  HLD    h/o esophageal   dysmotility   c/c  lymphedema         on lasix, hydralazine/ cozaar/ nrvasc   dvt ppx.  s/q    heparin       rad< from: TTE W or WO Ultrasound Enhancing Agent (06.25.23 @ 08:42) >   1. Normal left ventricular cavity size. The left ventricular wall thickness is normal. The left ventricular systolic function is normal with an ejection fraction of 71 % by Nagel's method of disks. There are no regional wall motion abnormalities seen.   2. There is mild (grade 1) left ventricular diastolic dysfunction.   3. Normal atria.   4. Normal right ventricular cavity size, normal right ventricular wall thickness and normal right ventricular systolic function.   5. No significant valvular disease.   6. Compared to the transthoracic echocardiogram performed on 3/22/2023 there have been no significant interval changes.  ________________________________________________________________________________________  FINDINGS:  < end of copied text >       rad< from: Xray Esophagram Single Contrast (06.26.23 @ 16:27) >  IMPRESSION:  Extensive esophageal dysmotility with tertiary contractions consistent   with dysmotility.  --- End of Report ---  < end of copied text >      73-year-old          h/o  COPD on 2L NC at home, hypothyroid,  breast CA, asthma, CHF, chronic lymphedema, wheelchair bound,         presenting with difficulty breathing./   had cough and difficulty breathing for the past couple of days,        then had significant trouble breathing, . had  worsened     was found by EMS to be hypoxic to 68% on 2 L nasal cannula.  /  was  placed on CPAP by EMS, which improved oxygen sat/ /  denies chest pain, syncope, fevers.      admitted wth sob    acute  copd  exacerbation.  on home oxygen        on  iv sterod. /  Proventil   spiriva         cxr.  no  pna     h/o   acute on  c/c diastolic  chf    on iv lasix, per  acrd    Ca  breast    HTN.  HLD    h/o esophageal   dysmotility   c/c  lymphedema         on lasix, hydralazine/ cozaar/ nrvasc   dvt ppx.  s/q    heparin       rad< from: TTE W or WO Ultrasound Enhancing Agent (06.25.23 @ 08:42) >   1. Normal left ventricular cavity size. The left ventricular wall thickness is normal. The left ventricular systolic function is normal with an ejection fraction of 71 % by Nagel's method of disks. There are no regional wall motion abnormalities seen.   2. There is mild (grade 1) left ventricular diastolic dysfunction.   3. Normal atria.   4. Normal right ventricular cavity size, normal right ventricular wall thickness and normal right ventricular systolic function.   5. No significant valvular disease.   6. Compared to the transthoracic echocardiogram performed on 3/22/2023 there have been no significant interval changes.  ________________________________________________________________________________________  FINDINGS:  < end of copied text >       rad< from: Xray Esophagram Single Contrast (06.26.23 @ 16:27) >  IMPRESSION:  Extensive esophageal dysmotility with tertiary contractions consistent   with dysmotility.  --- End of Report ---  < end of copied text >

## 2024-01-13 NOTE — ED PROVIDER NOTE - PHYSICAL EXAMINATION
GENERAL: chronically ill appearing, non-toxic appearing  HEAD: normocephalic, atraumatic  HEENT: PERRLA, EOMI, normal conjunctiva, oral mucosa dry  CARDIAC: regular rate and rhythm  PULM: diminished breath sounds throughout  GI: abdomen nondistended, soft, nontender  NEURO: alert and oriented x 3, normal speech, no gross neurologic deficit  MSK: +severe edema BLE  SKIN: no visible rashes, dry, well-perfused  PSYCH: appropriate mood and affect

## 2024-01-13 NOTE — H&P ADULT - NSHPPHYSICALEXAM_GEN_ALL_CORE
T(C): 36.7 (01-13-24 @ 06:02), Max: 38.3 (01-13-24 @ 01:15)  HR: 77 (01-13-24 @ 06:02) (77 - 85)  BP: 144/51 (01-13-24 @ 06:02) (126/56 - 144/51)  RR: 18 (01-13-24 @ 06:02) (18 - 22)  SpO2: 99% (01-13-24 @ 06:02) (97% - 100%)  GENERAL: NAD, lying in bed comfortably  HEAD:  Atraumatic, normocephalic  EYES: EOMI, PERRLA, conjunctiva and sclera clear  NECK: Supple, trachea midline, no JVD  HEART: Regular rate and rhythm, no murmurs, rubs, or gallops  LUNGS: Unlabored respirations.   few , wheezing, / rhonchi  ABDOMEN: Soft, nontender, nondistended, +BS  EXTREMITIES: 2+ peripheral pulses bilaterally. No clubbing, cyanosis, or edema  NERVOUS SYSTEM:  A&Ox3, moving all extremities, no focal deficits   SKIN: No rashes or lesions T(C): 36.7 (01-13-24 @ 06:02), Max: 38.3 (01-13-24 @ 01:15)  HR: 77 (01-13-24 @ 06:02) (77 - 85)  BP: 144/51 (01-13-24 @ 06:02) (126/56 - 144/51)  RR: 18 (01-13-24 @ 06:02) (18 - 22)  SpO2: 99% (01-13-24 @ 06:02) (97% - 100%)  GENERAL: NAD, lying in bed comfortably  HEAD:  Atraumatic, normocephalic  EYES: EOMI, PERRLA, conjunctiva and sclera clear  NECK: Supple, trachea midline, no JVD  HEART: Regular rate and rhythm, no murmurs, rubs, or gallops  LUNGS: Unlabored respirations.   few , wheezing, / rhonchi  ABDOMEN: Soft, nontender, nondistended, +BS  EXTREMITIES: 2+ peripheral pulses bilaterally. No clubbing, cyanosis,    +   c/c  edema  NERVOUS SYSTEM:  A&Ox3, moving all extremities, no focal deficits   SKIN: No rashes or lesions

## 2024-01-13 NOTE — ED PROVIDER NOTE - ATTENDING CONTRIBUTION TO CARE
Afebrile. Awake and Alert. Lungs diminished breath sounds. Heart RRR. Abdomen soft NTND. CN II-XII grossly intact. Moves all extremities without lateralization. Arrives on CPAP.

## 2024-01-13 NOTE — ED PROVIDER NOTE - OBJECTIVE STATEMENT
Patient is a 73-year-old history of COPD on 2L NC at home, hyperthyroid, breast CA, asthma, CHF, chronic lymphedema, wheelchair bound, presenting with difficulty breathing.  Patient has had cough and difficulty breathing for the past couple of days, today had significant trouble breathing, was found by EMS to be hypoxic to 68% on 2 L nasal cannula.  Patient placed on CPAP by EMS, which improved oxygen saturation.  Patient denies chest pain, syncope, fevers.

## 2024-01-13 NOTE — ED ADULT NURSE REASSESSMENT NOTE - NS ED NURSE REASSESS COMMENT FT1
Message sent to NP DARREN Cotton regarding medications needing to be crushed and given in applesauce, and to change/adjust as necessary. as per patients request.
Received report from ADDI Colon. pt A&Ox4. Pt able to follow all commands. Pulse, motor, sensation present and equal in all 4 extremities. pt Breathing spontaneous and unlabored on, Skin warm and dry and of color appropriate for ethnicity , moves all extremities, speech clear. pending admission bed. no further nurse intervention needed at this time.

## 2024-01-13 NOTE — H&P ADULT - NSHPLABSRESULTS_GEN_ALL_CORE
LABS:                        12.0   9.76  )-----------( 153      ( 2024 01:21 )             38.2         137  |  99  |  12  ----------------------------<  152<H>  4.1   |  24  |  0.67    Ca    9.8      2024 01:21    TPro  6.9  /  Alb  3.7  /  TBili  1.0  /  DBili  x   /  AST  12  /  ALT  13  /  AlkPhos  104  -13    PT/INR - ( 2024 01:21 )   PT: 12.2 sec;   INR: 1.17 ratio         PTT - ( 2024 01:21 )  PTT:74.2 sec      Urinalysis Basic - ( 2024 01:37 )    Color: Yellow / Appearance: Cloudy / S.016 / pH: x  Gluc: x / Ketone: Negative mg/dL  / Bili: Negative / Urobili: 0.2 mg/dL   Blood: x / Protein: Negative mg/dL / Nitrite: Positive   Leuk Esterase: Moderate / RBC: 3 /HPF / WBC 96 /HPF   Sq Epi: x / Non Sq Epi: 2 /HPF / Bacteria: Many /HPF          - @ 01:21  3.9  46

## 2024-01-13 NOTE — ED PROVIDER NOTE - CLINICAL SUMMARY MEDICAL DECISION MAKING FREE TEXT BOX
Patient is a 73-year-old history of COPD on 2L NC at home, hyperthyroid, breast CA, asthma, CHF, chronic lymphedema, wheelchair bound, presenting with difficulty breathing.  Patient chronically ill-appearing, mental status appropriate.  Patient with severe respiratory distress on arrival, however comfortable on BiPAP.  Settings at 16/5, 60%, patient saturating well. Patient with diminished breath sounds throughout. Will get VBG, labs, chest x-ray, infectious workup, to evaluate.  Will give DuoNebs, steroids.

## 2024-01-13 NOTE — H&P ADULT - HISTORY OF PRESENT ILLNESS
73-year-old          history of COPD on 2L NC at home, hyperthyroid, breast CA, asthma, CHF, chronic lymphedema, wheelchair bound,         presenting with difficulty breathing.     pt t has had cough and difficulty breathing for the past couple of days,  then had significant trouble breathing,      was found by EMS to be hypoxic to 68% on 2 L nasal cannula.     pt    was  placed on CPAP by EMS, which improved oxygen saturation.      pt  denies chest pain, syncope, fevers.

## 2024-01-13 NOTE — ED PROVIDER NOTE - CARE PLAN
1 Principal Discharge DX:	Shortness of breath   Principal Discharge DX:	Shortness of breath  Secondary Diagnosis:	Infection due to human metapneumovirus (hMPV)

## 2024-01-13 NOTE — ED PROVIDER NOTE - PROGRESS NOTE DETAILS
Christine Beverly MD PGY3: Patient initial VBG with CO2 56, patient reported to usually have a CO2 in the 50s. Last CO2 in chart was 61. Patient mentating well. pH not acidotic. Will continue to monitor on BiPAP. CXR with possible consolidation. Also +UTI. Flu/Covid pending. Abx ordered. Patient TBA. Christine Beverly MD PGY3: +hMPV, TBA

## 2024-01-13 NOTE — ED ADULT NURSE NOTE - NSFALLRISKINTERV_ED_ALL_ED
Communicate fall risk and risk factors to all staff, patient, and family/Provide visual cue: yellow wristband, yellow gown, etc/Reinforce activity limits and safety measures with patient and family/Call bell, personal items and telephone in reach/Instruct patient to call for assistance before getting out of bed/chair/stretcher/Non-slip footwear applied when patient is off stretcher/[place] to call system/Physically safe environment - no spills, clutter or unnecessary equipment/Purposeful Proactive Rounding/Room/bathroom lighting operational, light cord in reach Communicate fall risk and risk factors to all staff, patient, and family/Provide visual cue: yellow wristband, yellow gown, etc/Reinforce activity limits and safety measures with patient and family/Call bell, personal items and telephone in reach/Instruct patient to call for assistance before getting out of bed/chair/stretcher/Non-slip footwear applied when patient is off stretcher/[place] to call system/Physically safe environment - no spills, clutter or unnecessary equipment/Purposeful Proactive Rounding/Room/bathroom lighting operational, light cord in reach

## 2024-01-14 LAB
ANION GAP SERPL CALC-SCNC: 10 MMOL/L — SIGNIFICANT CHANGE UP (ref 5–17)
ANION GAP SERPL CALC-SCNC: 10 MMOL/L — SIGNIFICANT CHANGE UP (ref 5–17)
BUN SERPL-MCNC: 11 MG/DL — SIGNIFICANT CHANGE UP (ref 7–23)
BUN SERPL-MCNC: 11 MG/DL — SIGNIFICANT CHANGE UP (ref 7–23)
CALCIUM SERPL-MCNC: 9.9 MG/DL — SIGNIFICANT CHANGE UP (ref 8.4–10.5)
CALCIUM SERPL-MCNC: 9.9 MG/DL — SIGNIFICANT CHANGE UP (ref 8.4–10.5)
CHLORIDE SERPL-SCNC: 103 MMOL/L — SIGNIFICANT CHANGE UP (ref 96–108)
CHLORIDE SERPL-SCNC: 103 MMOL/L — SIGNIFICANT CHANGE UP (ref 96–108)
CO2 SERPL-SCNC: 28 MMOL/L — SIGNIFICANT CHANGE UP (ref 22–31)
CO2 SERPL-SCNC: 28 MMOL/L — SIGNIFICANT CHANGE UP (ref 22–31)
CREAT SERPL-MCNC: 0.54 MG/DL — SIGNIFICANT CHANGE UP (ref 0.5–1.3)
CREAT SERPL-MCNC: 0.54 MG/DL — SIGNIFICANT CHANGE UP (ref 0.5–1.3)
CULTURE RESULTS: ABNORMAL
CULTURE RESULTS: ABNORMAL
EGFR: 96 ML/MIN/1.73M2 — SIGNIFICANT CHANGE UP
EGFR: 96 ML/MIN/1.73M2 — SIGNIFICANT CHANGE UP
GLUCOSE SERPL-MCNC: 161 MG/DL — HIGH (ref 70–99)
GLUCOSE SERPL-MCNC: 161 MG/DL — HIGH (ref 70–99)
POTASSIUM SERPL-MCNC: 4.4 MMOL/L — SIGNIFICANT CHANGE UP (ref 3.5–5.3)
POTASSIUM SERPL-MCNC: 4.4 MMOL/L — SIGNIFICANT CHANGE UP (ref 3.5–5.3)
POTASSIUM SERPL-SCNC: 4.4 MMOL/L — SIGNIFICANT CHANGE UP (ref 3.5–5.3)
POTASSIUM SERPL-SCNC: 4.4 MMOL/L — SIGNIFICANT CHANGE UP (ref 3.5–5.3)
PROCALCITONIN SERPL-MCNC: 0.07 NG/ML — SIGNIFICANT CHANGE UP (ref 0.02–0.1)
PROCALCITONIN SERPL-MCNC: 0.07 NG/ML — SIGNIFICANT CHANGE UP (ref 0.02–0.1)
SODIUM SERPL-SCNC: 141 MMOL/L — SIGNIFICANT CHANGE UP (ref 135–145)
SODIUM SERPL-SCNC: 141 MMOL/L — SIGNIFICANT CHANGE UP (ref 135–145)
SPECIMEN SOURCE: SIGNIFICANT CHANGE UP
SPECIMEN SOURCE: SIGNIFICANT CHANGE UP

## 2024-01-14 PROCEDURE — 99222 1ST HOSP IP/OBS MODERATE 55: CPT

## 2024-01-14 RX ORDER — VANCOMYCIN HCL 1 G
1000 VIAL (EA) INTRAVENOUS ONCE
Refills: 0 | Status: COMPLETED | OUTPATIENT
Start: 2024-01-14 | End: 2024-01-14

## 2024-01-14 RX ORDER — INFLUENZA VIRUS VACCINE 15; 15; 15; 15 UG/.5ML; UG/.5ML; UG/.5ML; UG/.5ML
0.7 SUSPENSION INTRAMUSCULAR ONCE
Refills: 0 | Status: DISCONTINUED | OUTPATIENT
Start: 2024-01-14 | End: 2024-01-22

## 2024-01-14 RX ORDER — CEFTRIAXONE 500 MG/1
2000 INJECTION, POWDER, FOR SOLUTION INTRAMUSCULAR; INTRAVENOUS ONCE
Refills: 0 | Status: COMPLETED | OUTPATIENT
Start: 2024-01-14 | End: 2024-01-14

## 2024-01-14 RX ORDER — ACETAMINOPHEN 500 MG
650 TABLET ORAL EVERY 6 HOURS
Refills: 0 | Status: DISCONTINUED | OUTPATIENT
Start: 2024-01-14 | End: 2024-01-22

## 2024-01-14 RX ORDER — CEFTRIAXONE 500 MG/1
INJECTION, POWDER, FOR SOLUTION INTRAMUSCULAR; INTRAVENOUS
Refills: 0 | Status: DISCONTINUED | OUTPATIENT
Start: 2024-01-14 | End: 2024-01-17

## 2024-01-14 RX ORDER — CEFTRIAXONE 500 MG/1
2000 INJECTION, POWDER, FOR SOLUTION INTRAMUSCULAR; INTRAVENOUS EVERY 24 HOURS
Refills: 0 | Status: DISCONTINUED | OUTPATIENT
Start: 2024-01-15 | End: 2024-01-17

## 2024-01-14 RX ADMIN — Medication 20 MILLIGRAM(S): at 12:50

## 2024-01-14 RX ADMIN — Medication 81 MILLIGRAM(S): at 12:49

## 2024-01-14 RX ADMIN — HEPARIN SODIUM 5000 UNIT(S): 5000 INJECTION INTRAVENOUS; SUBCUTANEOUS at 18:08

## 2024-01-14 RX ADMIN — TIOTROPIUM BROMIDE 2 PUFF(S): 18 CAPSULE ORAL; RESPIRATORY (INHALATION) at 12:45

## 2024-01-14 RX ADMIN — Medication 40 MILLIGRAM(S): at 06:12

## 2024-01-14 RX ADMIN — Medication 650 MILLIGRAM(S): at 16:00

## 2024-01-14 RX ADMIN — Medication 3 MILLILITER(S): at 12:45

## 2024-01-14 RX ADMIN — Medication 20 MILLIGRAM(S): at 21:12

## 2024-01-14 RX ADMIN — GABAPENTIN 100 MILLIGRAM(S): 400 CAPSULE ORAL at 18:08

## 2024-01-14 RX ADMIN — Medication 50 MILLIGRAM(S): at 21:12

## 2024-01-14 RX ADMIN — LOSARTAN POTASSIUM 100 MILLIGRAM(S): 100 TABLET, FILM COATED ORAL at 06:13

## 2024-01-14 RX ADMIN — Medication 20 MILLIGRAM(S): at 06:14

## 2024-01-14 RX ADMIN — Medication 125 MICROGRAM(S): at 06:13

## 2024-01-14 RX ADMIN — OLANZAPINE 10 MILLIGRAM(S): 15 TABLET, FILM COATED ORAL at 21:12

## 2024-01-14 RX ADMIN — DULOXETINE HYDROCHLORIDE 60 MILLIGRAM(S): 30 CAPSULE, DELAYED RELEASE ORAL at 12:50

## 2024-01-14 RX ADMIN — CEFTRIAXONE 100 MILLIGRAM(S): 500 INJECTION, POWDER, FOR SOLUTION INTRAMUSCULAR; INTRAVENOUS at 03:58

## 2024-01-14 RX ADMIN — Medication 50 MILLIGRAM(S): at 00:20

## 2024-01-14 RX ADMIN — Medication 600 MILLIGRAM(S): at 18:09

## 2024-01-14 RX ADMIN — Medication 3 MILLILITER(S): at 18:09

## 2024-01-14 RX ADMIN — Medication 20 MILLIGRAM(S): at 12:51

## 2024-01-14 RX ADMIN — Medication 50 MILLIGRAM(S): at 12:49

## 2024-01-14 RX ADMIN — HEPARIN SODIUM 5000 UNIT(S): 5000 INJECTION INTRAVENOUS; SUBCUTANEOUS at 08:46

## 2024-01-14 RX ADMIN — Medication 50 MILLIGRAM(S): at 06:13

## 2024-01-14 RX ADMIN — MONTELUKAST 10 MILLIGRAM(S): 4 TABLET, CHEWABLE ORAL at 12:50

## 2024-01-14 RX ADMIN — GABAPENTIN 100 MILLIGRAM(S): 400 CAPSULE ORAL at 06:13

## 2024-01-14 RX ADMIN — Medication 250 MILLIGRAM(S): at 03:58

## 2024-01-14 RX ADMIN — Medication 600 MILLIGRAM(S): at 06:12

## 2024-01-14 RX ADMIN — ATORVASTATIN CALCIUM 40 MILLIGRAM(S): 80 TABLET, FILM COATED ORAL at 21:12

## 2024-01-14 RX ADMIN — Medication 3 MILLILITER(S): at 06:13

## 2024-01-14 NOTE — PATIENT PROFILE ADULT - FUNCTIONAL ASSESSMENT - BASIC MOBILITY 6.
2-calculated by average/Not able to assess (calculate score using [facility] averaging method) 2-calculated by average/Not able to assess (calculate score using [facility] averaging method)

## 2024-01-14 NOTE — PROGRESS NOTE ADULT - ASSESSMENT
Assessment and Recommendation:   Acute hypoxic respiratory Failure on 02 on top of chronic   keep 02 saturation > 87%  Acute COPD exacerbation  H/O graves disease   S/P left nephrectomy for renal cell carcinoma   Morbid obese and NOEMY   bilateral chronic lymphedema  S/P cervical laminectomy and cage placement   wheel chair bound and home bound   chronic pain syndrome   solumedrol 30 mg Q 8 hrs   Duoneb q 6 hrs   continue Rocephin  DVT and GI prophylaxis   spend 40 minutes on consultation and discussion with PCP > 50% counselling the patient

## 2024-01-14 NOTE — CHART NOTE - NSCHARTNOTEFT_GEN_A_CORE
MEDICINE PA NOTE:    RN notified BCx growing Gram positive cocci in clusters in aerobic bottles, Staphylococcus epidermidis - Methicillin resistant.   - Ordered repeat BCx  - Was on CTX 1g - changed to 2g   - Ordered vancomycin 1g IV x1  - C/s ID in AM  - Will endorse to AM team to follow  - Will continue to monitor clinical presentation and vital signs    JOHNATHON Wakefield-C  Medicine  [id number] MEDICINE PA NOTE:    RN notified BCx growing Gram positive cocci in clusters in aerobic bottles, Staphylococcus epidermidis - Methicillin resistant.   - Ordered repeat BCx  - Was on CTX 1g - changed to 2g   - Ordered vancomycin 1g IV x1  - C/s ID in AM  - Will endorse to AM team to follow  - Will continue to monitor clinical presentation and vital signs    JOHNATHON Wakefield-C  Medicine  [id number]

## 2024-01-14 NOTE — CONSULT NOTE ADULT - SUBJECTIVE AND OBJECTIVE BOX
Patient is a 73y old  Female who presents with a chief complaint of sob (2024 16:28)    HPI:  Patient is a 73-year-old history of COPD on 2L NC at home, hyperthyroid, breast CA, asthma, CHF, chronic lymphedema, wheelchair bound, presenting with difficulty breathing.  Patient has had cough and difficulty breathing for the past couple of days, today had significant trouble breathing, was found by EMS to be hypoxic to 68% on 2 L nasal cannula.  Patient placed on CPAP by EMS, which improved oxygen saturation.  Patient denied chest pain, syncope, fevers at admission. Overnight blood cultures turned positive for MRSE, a dose of Vancomycin was given 1:47 ( blood cultures were received at lab 4:46, unclear if collected before antibiotics). ID consulted for further recs.    ED tmax 100.9, WBC 9.7    RVP hMPV+    UA 96 WBC, Many bacteria    Bcx ;  MRSE    CT Chest  Left lower lobe consolidation likely representing pneumonia. Tree-in-bud   opacities within posterior left upper lobe. Recommend follow-up chest CT   in 1-3 months to determine resolution.      prior hospital charts reviewed [  ]  primary team notes reviewed [x  ]  other consultant notes reviewed [ x ]    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      History of carpal tunnel release  right wrist      History of parathyroidectomy      S/P laminectomy  now bed and wheelchair ridden with severe pain          Allergies  Grapes (Hives)  No Known Drug Allergies  Peaches (Hives)  shellfish (Hives)    ANTIMICROBIALS (past 90 days)  MEDICATIONS  (STANDING):  azithromycin  IVPB   255 mL/Hr IV Intermittent (24 @ 03:20)    cefTRIAXone   IVPB   100 mL/Hr IV Intermittent (24 @ 02:38)    cefTRIAXone   IVPB   100 mL/Hr IV Intermittent (24 @ 03:58)    vancomycin  IVPB   250 mL/Hr IV Intermittent (24 @ 03:58)        cefTRIAXone   IVPB      MEDICATIONS  (STANDING):  albuterol    90 MICROgram(s) HFA Inhaler 2 every 6 hours  albuterol/ipratropium for Nebulization 3 every 6 hours  aspirin enteric coated 81 daily  atorvastatin 40 at bedtime  busPIRone 20 <User Schedule>  DULoxetine 60 daily  furosemide   Injectable 40 daily  gabapentin 100 two times a day  guaiFENesin  every 12 hours  heparin   Injectable 5000 every 12 hours  hydrALAZINE 50 three times a day  influenza  Vaccine (HIGH DOSE) 0.7 once  levothyroxine 125 daily  losartan 100 daily  methylPREDNISolone sodium succinate Injectable 20 three times a day  montelukast 10 daily  OLANZapine 10 at bedtime  propranolol 20 two times a day  tiotropium 2.5 MICROgram(s) Inhaler 2 daily    SOCIAL HISTORY:       FAMILY HISTORY:  Family history of myocardial infarction  mother  at age 67 and father at age 67 of MI's    Family history of hypertension  mother and father    Family history of diabetes mellitus (Sibling)  siblings    Family history of heart disease (Sibling)  brother has a PPM    Family history of thyroid cancer (Child)  daughter has thyroid cancer      REVIEW OF SYSTEMS  [  ] ROS unobtainable because:    [  ] All other systems negative except as noted below:	    Constitutional:  [ ] fever [ ] chills  [ ] weight loss  [ ] weakness  Skin:  [ ] rash [ ] phlebitis	  Eyes: [ ] icterus [ ] pain  [ ] discharge	  ENMT: [ ] sore throat  [ ] thrush [ ] ulcers [ ] exudates  Respiratory: [ ] dyspnea [ ] hemoptysis [ ] cough [ ] sputum	  Cardiovascular:  [ ] chest pain [ ] palpitations [ ] edema	  Gastrointestinal:  [ ] nausea [ ] vomiting [ ] diarrhea [ ] constipation [ ] pain	  Genitourinary:  [ ] dysuria [ ] frequency [ ] hematuria [ ] discharge [ ] flank pain  [ ] incontinence  Musculoskeletal:  [ ] myalgias [ ] arthralgias [ ] arthritis  [ ] back pain  Neurological:  [ ] headache [ ] seizures  [ ] confusion/altered mental status  Psychiatric:  [ ] anxiety [ ] depression	  Hematology/Lymphatics:  [ ] lymphadenopathy  Endocrine:  [ ] adrenal [ ] thyroid  Allergic/Immunologic:	 [ ] transplant [ ] seasonal    Vital Signs Last 24 Hrs  T(F): 98.4 (24 @ 11:28), Max: 100.9 (24 @ 01:15)  Vital Signs Last 24 Hrs  HR: 83 (24 @ 11:28) (78 - 93)  BP: 160/78 (24 @ 11:28) (160/78 - 185/101)  RR: 20 (24 @ 11:28)  SpO2: 93% (24 @ 11:28) (93% - 99%)  Wt(kg): --    PHYSICAL EXAM:                              12.0   9.76  )-----------( 153      ( 2024 01:21 )             38.2       141  |  103  |  11  ----------------------------<  161<H>  4.4   |  28  |  0.54    Ca    9.9      2024 04:17    TPro  6.9  /  Alb  3.7  /  TBili  1.0  /  DBili  x   /  AST  12  /  ALT  13  /  AlkPhos  104  01-13    Urinalysis Basic - ( 2024 04:17 )    Color: x / Appearance: x / SG: x / pH: x  Gluc: 161 mg/dL / Ketone: x  / Bili: x / Urobili: x   Blood: x / Protein: x / Nitrite: x   Leuk Esterase: x / RBC: x / WBC x   Sq Epi: x / Non Sq Epi: x / Bacteria: x    MICROBIOLOGY:  Culture - Blood (collected 2024 00:45)  Source: .Blood Blood-Peripheral  Gram Stain (2024 23:35):    Growth in aerobic bottle: Gram Positive Cocci in Clusters  Preliminary Report (2024 23:35):    Growth in aerobic bottle: Gram Positive Cocci in Clusters    Culture - Blood (collected 2024 00:30)  Source: .Blood Blood-Peripheral  Gram Stain (2024 20:50):    Growth in aerobic bottle: Gram Positive Cocci in Clusters  Preliminary Report (2024 20:50):    Growth in aerobic bottle: Gram Positive Cocci in Clusters    Direct identification is available within approximately 3-5    hours either by Blood Panel Multiplexed PCR or Direct    MALDI-TOF. Details: [email or web address]  Organism: Blood Culture PCR (2024 22:08)  Organism: Blood Culture PCR (2024 22:08)      Method Type: PCR      -  Staphylococcus epidermidis, Methicillin resistant: Detec              Rapid RVP Result: Detected ( @ 01:48)      RADIOLOGY:  imaging below personally reviewed and agree with findings    < from: CT Chest No Cont (24 @ 14:55) >    ACC: [id number] EXAM:  CT CHEST   ORDERED BY: LESLYE WALLACE     PROCEDURE DATE:  2024          INTERPRETATION:  CLINICAL INFORMATION: Shortness of breath    COMPARISON: CT chest 2023    CONTRAST/COMPLICATIONS:  IV Contrast: NONE  Oral Contrast: NONE  Complications: None reported at time of study completion    PROCEDURE:  CT of the Chest was performed.  Sagittal and coronal reformats were performed.    FINDINGS:    LUNGS AND AIRWAYS: Impaction of segmental branches of basilar left lower   lobe bronchus.  Emphysema. Left lower lobe consolidation. Tree-in-bud   opacities within posterior left upper lobe.  PLEURA: No pleural effusion.  MEDIASTINUM AND MIYA: No lymphadenopathy.  VESSELS: Dilated pulmonary artery measuring 4 cm representing pulmonary   hypertension. Thoracic aorta normal in course and caliber.  HEART: Cardiomegaly. Trace pericardial fluid. Aortic valve calcification.  CHEST WALL AND LOWER NECK: Within normal limits.  VISUALIZED UPPER ABDOMEN: Cholecystectomy. Atrophic pancreas with   scattered calcifications, likely sequela of chronic pancreatitis. Left   renal cyst.  BONES: Degenerative changes. Partially visualized cervical spinal fusion   hardware.    IMPRESSION:  Left lower lobe consolidation likely representing pneumonia. Tree-in-bud   opacities within posterior left upper lobe. Recommend follow-up chest CT   in 1-3 months to determine resolution.    Emphysema..    --- End of Report ---      < end of copied text >   Patient is a 73y old  Female who presents with a chief complaint of sob (2024 16:28)    HPI:  Patient is a 73-year-old history of COPD on 2L NC at home, hyperthyroid, breast CA, asthma, CHF, chronic lymphedema, wheelchair bound, presenting with difficulty breathing.  Patient has had cough and difficulty breathing for the past couple of days, today had significant trouble breathing, was found by EMS to be hypoxic to 68% on 2 L nasal cannula.  Patient placed on CPAP by EMS, which improved oxygen saturation.  Patient denied chest pain, syncope, fevers at admission. Overnight blood cultures turned positive for MRSE, a dose of Vancomycin was given 1:47 ( blood cultures were received at lab 4:46, unclear if collected before antibiotics). ID consulted for further recs.    ED tmax 100.9, WBC 9.7    RVP hMPV+    UA 96 WBC, Many bacteria    Bcx ;  MRSE    CT Chest  Left lower lobe consolidation likely representing pneumonia. Tree-in-bud   opacities within posterior left upper lobe. Recommend follow-up chest CT   in 1-3 months to determine resolution.      prior hospital charts reviewed [  ]  primary team notes reviewed [x  ]  other consultant notes reviewed [ x ]    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      History of carpal tunnel release  right wrist      History of parathyroidectomy      S/P laminectomy  now bed and wheelchair ridden with severe pain          Allergies  Grapes (Hives)  No Known Drug Allergies  Peaches (Hives)  shellfish (Hives)    ANTIMICROBIALS (past 90 days)  MEDICATIONS  (STANDING):  azithromycin  IVPB   255 mL/Hr IV Intermittent (24 @ 03:20)    cefTRIAXone   IVPB   100 mL/Hr IV Intermittent (24 @ 02:38)    cefTRIAXone   IVPB   100 mL/Hr IV Intermittent (24 @ 03:58)    vancomycin  IVPB   250 mL/Hr IV Intermittent (24 @ 03:58)        cefTRIAXone   IVPB      MEDICATIONS  (STANDING):  albuterol    90 MICROgram(s) HFA Inhaler 2 every 6 hours  albuterol/ipratropium for Nebulization 3 every 6 hours  aspirin enteric coated 81 daily  atorvastatin 40 at bedtime  busPIRone 20 <User Schedule>  DULoxetine 60 daily  furosemide   Injectable 40 daily  gabapentin 100 two times a day  guaiFENesin  every 12 hours  heparin   Injectable 5000 every 12 hours  hydrALAZINE 50 three times a day  influenza  Vaccine (HIGH DOSE) 0.7 once  levothyroxine 125 daily  losartan 100 daily  methylPREDNISolone sodium succinate Injectable 20 three times a day  montelukast 10 daily  OLANZapine 10 at bedtime  propranolol 20 two times a day  tiotropium 2.5 MICROgram(s) Inhaler 2 daily    SOCIAL HISTORY:       FAMILY HISTORY:  Family history of myocardial infarction  mother  at age 67 and father at age 67 of MI's    Family history of hypertension  mother and father    Family history of diabetes mellitus (Sibling)  siblings    Family history of heart disease (Sibling)  brother has a PPM    Family history of thyroid cancer (Child)  daughter has thyroid cancer      REVIEW OF SYSTEMS  [  ] ROS unobtainable because:    [  ] All other systems negative except as noted below:	    Constitutional:  [ ] fever [ ] chills  [ ] weight loss  [ ] weakness  Skin:  [ ] rash [ ] phlebitis	  Eyes: [ ] icterus [ ] pain  [ ] discharge	  ENMT: [ ] sore throat  [ ] thrush [ ] ulcers [ ] exudates  Respiratory: [ ] dyspnea [ ] hemoptysis [ ] cough [ ] sputum	  Cardiovascular:  [ ] chest pain [ ] palpitations [ ] edema	  Gastrointestinal:  [ ] nausea [ ] vomiting [ ] diarrhea [ ] constipation [ ] pain	  Genitourinary:  [ ] dysuria [ ] frequency [ ] hematuria [ ] discharge [ ] flank pain  [ ] incontinence  Musculoskeletal:  [ ] myalgias [ ] arthralgias [ ] arthritis  [ ] back pain  Neurological:  [ ] headache [ ] seizures  [ ] confusion/altered mental status  Psychiatric:  [ ] anxiety [ ] depression	  Hematology/Lymphatics:  [ ] lymphadenopathy  Endocrine:  [ ] adrenal [ ] thyroid  Allergic/Immunologic:	 [ ] transplant [ ] seasonal    Vital Signs Last 24 Hrs  T(F): 98.4 (24 @ 11:28), Max: 100.9 (24 @ 01:15)  Vital Signs Last 24 Hrs  HR: 83 (24 @ 11:28) (78 - 93)  BP: 160/78 (24 @ 11:28) (160/78 - 185/101)  RR: 20 (24 @ 11:28)  SpO2: 93% (24 @ 11:28) (93% - 99%)  Wt(kg): --    PHYSICAL EXAM:                              12.0   9.76  )-----------( 153      ( 2024 01:21 )             38.2       141  |  103  |  11  ----------------------------<  161<H>  4.4   |  28  |  0.54    Ca    9.9      2024 04:17    TPro  6.9  /  Alb  3.7  /  TBili  1.0  /  DBili  x   /  AST  12  /  ALT  13  /  AlkPhos  104  01-13    Urinalysis Basic - ( 2024 04:17 )    Color: x / Appearance: x / SG: x / pH: x  Gluc: 161 mg/dL / Ketone: x  / Bili: x / Urobili: x   Blood: x / Protein: x / Nitrite: x   Leuk Esterase: x / RBC: x / WBC x   Sq Epi: x / Non Sq Epi: x / Bacteria: x    MICROBIOLOGY:  Culture - Blood (collected 2024 00:45)  Source: .Blood Blood-Peripheral  Gram Stain (2024 23:35):    Growth in aerobic bottle: Gram Positive Cocci in Clusters  Preliminary Report (2024 23:35):    Growth in aerobic bottle: Gram Positive Cocci in Clusters    Culture - Blood (collected 2024 00:30)  Source: .Blood Blood-Peripheral  Gram Stain (2024 20:50):    Growth in aerobic bottle: Gram Positive Cocci in Clusters  Preliminary Report (2024 20:50):    Growth in aerobic bottle: Gram Positive Cocci in Clusters    Direct identification is available within approximately 3-5    hours either by Blood Panel Multiplexed PCR or Direct    MALDI-TOF. Details: [email or web address]  Organism: Blood Culture PCR (2024 22:08)  Organism: Blood Culture PCR (2024 22:08)      Method Type: PCR      -  Staphylococcus epidermidis, Methicillin resistant: Detec              Rapid RVP Result: Detected ( @ 01:48)      RADIOLOGY:  imaging below personally reviewed and agree with findings    < from: CT Chest No Cont (24 @ 14:55) >    ACC: [id number] EXAM:  CT CHEST   ORDERED BY: LESLYE WALLACE     PROCEDURE DATE:  2024          INTERPRETATION:  CLINICAL INFORMATION: Shortness of breath    COMPARISON: CT chest 2023    CONTRAST/COMPLICATIONS:  IV Contrast: NONE  Oral Contrast: NONE  Complications: None reported at time of study completion    PROCEDURE:  CT of the Chest was performed.  Sagittal and coronal reformats were performed.    FINDINGS:    LUNGS AND AIRWAYS: Impaction of segmental branches of basilar left lower   lobe bronchus.  Emphysema. Left lower lobe consolidation. Tree-in-bud   opacities within posterior left upper lobe.  PLEURA: No pleural effusion.  MEDIASTINUM AND MIYA: No lymphadenopathy.  VESSELS: Dilated pulmonary artery measuring 4 cm representing pulmonary   hypertension. Thoracic aorta normal in course and caliber.  HEART: Cardiomegaly. Trace pericardial fluid. Aortic valve calcification.  CHEST WALL AND LOWER NECK: Within normal limits.  VISUALIZED UPPER ABDOMEN: Cholecystectomy. Atrophic pancreas with   scattered calcifications, likely sequela of chronic pancreatitis. Left   renal cyst.  BONES: Degenerative changes. Partially visualized cervical spinal fusion   hardware.    IMPRESSION:  Left lower lobe consolidation likely representing pneumonia. Tree-in-bud   opacities within posterior left upper lobe. Recommend follow-up chest CT   in 1-3 months to determine resolution.    Emphysema..    --- End of Report ---      < end of copied text >   Patient is a 73y old  Female who presents with a chief complaint of sob (2024 16:28)    HPI:  Patient is a 73-year-old history of COPD on 2L NC at home, hyperthyroid, breast CA, asthma, CHF, chronic lymphedema, wheelchair bound, presenting with difficulty breathing.  Patient has had cough and difficulty breathing for the past couple of days, and her son also having similar symptoms. ON the day of admission she had significant trouble breathing, was found by EMS to be hypoxic to 68% on 2 L nasal cannula.  Patient placed on CPAP by EMS, which improved oxygen saturation.  Patient denied chest pain, syncope, fevers at admission. Overnight blood cultures turned positive for MRSE, a dose of Vancomycin was given 1:47 ( blood cultures were received at lab 4:46, unclear if collected before antibiotics). ID consulted for further recs.    ED tmax 100.9, WBC 9.7    RVP hMPV+    UA 96 WBC, Many bacteria    Bcx ;  MRSE    CT Chest  Left lower lobe consolidation likely representing pneumonia. Tree-in-bud   opacities within posterior left upper lobe. Recommend follow-up chest CT   in 1-3 months to determine resolution.      prior hospital charts reviewed [  ]  primary team notes reviewed [x  ]  other consultant notes reviewed [ x ]    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      History of carpal tunnel release  right wrist      History of parathyroidectomy      S/P laminectomy  now bed and wheelchair ridden with severe pain          Allergies  Grapes (Hives)  No Known Drug Allergies  Peaches (Hives)  shellfish (Hives)    ANTIMICROBIALS (past 90 days)  MEDICATIONS  (STANDING):  azithromycin  IVPB   255 mL/Hr IV Intermittent (24 @ 03:20)    cefTRIAXone   IVPB   100 mL/Hr IV Intermittent (24 @ 02:38)    cefTRIAXone   IVPB   100 mL/Hr IV Intermittent (24 @ 03:58)    vancomycin  IVPB   250 mL/Hr IV Intermittent (24 @ 03:58)        cefTRIAXone   IVPB      MEDICATIONS  (STANDING):  albuterol    90 MICROgram(s) HFA Inhaler 2 every 6 hours  albuterol/ipratropium for Nebulization 3 every 6 hours  aspirin enteric coated 81 daily  atorvastatin 40 at bedtime  busPIRone 20 <User Schedule>  DULoxetine 60 daily  furosemide   Injectable 40 daily  gabapentin 100 two times a day  guaiFENesin  every 12 hours  heparin   Injectable 5000 every 12 hours  hydrALAZINE 50 three times a day  influenza  Vaccine (HIGH DOSE) 0.7 once  levothyroxine 125 daily  losartan 100 daily  methylPREDNISolone sodium succinate Injectable 20 three times a day  montelukast 10 daily  OLANZapine 10 at bedtime  propranolol 20 two times a day  tiotropium 2.5 MICROgram(s) Inhaler 2 daily    SOCIAL HISTORY: Lives at home with     FAMILY HISTORY:  Family history of myocardial infarction  mother  at age 67 and father at age 67 of MI's    Family history of hypertension  mother and father    Family history of diabetes mellitus (Sibling)  siblings    Family history of heart disease (Sibling)  brother has a PPM    Family history of thyroid cancer (Child)  daughter has thyroid cancer      REVIEW OF SYSTEMS  [  ] ROS unobtainable because:    [ x ] All other systems negative except as noted below:	    Constitutional:  [ ] fever [ ] chills  [ ] weight loss  [ ] weakness  Skin:  [ ] rash [ ] phlebitis	  Eyes: [ ] icterus [ ] pain  [ ] discharge	  ENMT: [ ] sore throat  [ ] thrush [ ] ulcers [ ] exudates  Respiratory: [ ] dyspnea [ ] hemoptysis [x ] cough [x ] sputum	  Cardiovascular:  [ ] chest pain [ ] palpitations [ ] edema	  Gastrointestinal:  [ ] nausea [ ] vomiting [ ] diarrhea [ ] constipation [ ] pain	  Genitourinary:  [ ] dysuria [ ] frequency [ ] hematuria [ ] discharge [ ] flank pain  [ ] incontinence  Musculoskeletal:  [ ] myalgias [ ] arthralgias [ ] arthritis  [ ] back pain  Neurological:  [ ] headache [ ] seizures  [ ] confusion/altered mental status  Psychiatric:  [ ] anxiety [ ] depression	  Hematology/Lymphatics:  [ ] lymphadenopathy  Endocrine:  [ ] adrenal [ ] thyroid  Allergic/Immunologic:	 [ ] transplant [ ] seasonal    Vital Signs Last 24 Hrs  T(F): 98.4 (24 @ 11:28), Max: 100.9 (24 @ 01:15)  Vital Signs Last 24 Hrs  HR: 83 (24 @ 11:28) (78 - 93)  BP: 160/78 (24 @ 11:28) (160/78 - 185/101)  RR: 20 (24 @ 11:28)  SpO2: 93% (24 @ 11:28) (93% - 99%)  Wt(kg): --    PHYSICAL EXAM:    General: Patient in NAD  HEENT: NCAT, EOMI, PERRL, no oral lesions  CV: S1+S2, no m/r/g appreciated   Lungs: No respiratory distress, CTAB  Abd: Soft, nontender, no guarding, no rebound tenderness, + bowel sounds   : No suprapubic tenderness  Neuro: Alert and oriented to time, place and person. No focal deficits noted.   Ext: No cyanosis, chronic lymphedema BLE  Skin: No rash, no phlebitis                              12.0   9.76  )-----------( 153      ( 2024 01:21 )             38.2   -    141  |  103  |  11  ----------------------------<  161<H>  4.4   |  28  |  0.54    Ca    9.9      2024 04:17    TPro  6.9  /  Alb  3.7  /  TBili  1.0  /  DBili  x   /  AST  12  /  ALT  13  /  AlkPhos  104  01-13    Urinalysis Basic - ( 2024 04:17 )    Color: x / Appearance: x / SG: x / pH: x  Gluc: 161 mg/dL / Ketone: x  / Bili: x / Urobili: x   Blood: x / Protein: x / Nitrite: x   Leuk Esterase: x / RBC: x / WBC x   Sq Epi: x / Non Sq Epi: x / Bacteria: x    MICROBIOLOGY:  Culture - Blood (collected 2024 00:45)  Source: .Blood Blood-Peripheral  Gram Stain (2024 23:35):    Growth in aerobic bottle: Gram Positive Cocci in Clusters  Preliminary Report (2024 23:35):    Growth in aerobic bottle: Gram Positive Cocci in Clusters    Culture - Blood (collected 2024 00:30)  Source: .Blood Blood-Peripheral  Gram Stain (2024 20:50):    Growth in aerobic bottle: Gram Positive Cocci in Clusters  Preliminary Report (2024 20:50):    Growth in aerobic bottle: Gram Positive Cocci in Clusters    Direct identification is available within approximately 3-5    hours either by Blood Panel Multiplexed PCR or Direct    MALDI-TOF. Details: [email or web address]  Organism: Blood Culture PCR (2024 22:08)  Organism: Blood Culture PCR (2024 22:08)      Method Type: PCR      -  Staphylococcus epidermidis, Methicillin resistant: Detec              Rapid RVP Result: Detected ( @ 01:48)      RADIOLOGY:  imaging below personally reviewed and agree with findings    < from: CT Chest No Cont (24 @ 14:55) >    ACC: [id number] EXAM:  CT CHEST   ORDERED BY: LESLYE WALLACE     PROCEDURE DATE:  2024          INTERPRETATION:  CLINICAL INFORMATION: Shortness of breath    COMPARISON: CT chest 2023    CONTRAST/COMPLICATIONS:  IV Contrast: NONE  Oral Contrast: NONE  Complications: None reported at time of study completion    PROCEDURE:  CT of the Chest was performed.  Sagittal and coronal reformats were performed.    FINDINGS:    LUNGS AND AIRWAYS: Impaction of segmental branches of basilar left lower   lobe bronchus.  Emphysema. Left lower lobe consolidation. Tree-in-bud   opacities within posterior left upper lobe.  PLEURA: No pleural effusion.  MEDIASTINUM AND MIYA: No lymphadenopathy.  VESSELS: Dilated pulmonary artery measuring 4 cm representing pulmonary   hypertension. Thoracic aorta normal in course and caliber.  HEART: Cardiomegaly. Trace pericardial fluid. Aortic valve calcification.  CHEST WALL AND LOWER NECK: Within normal limits.  VISUALIZED UPPER ABDOMEN: Cholecystectomy. Atrophic pancreas with   scattered calcifications, likely sequela of chronic pancreatitis. Left   renal cyst.  BONES: Degenerative changes. Partially visualized cervical spinal fusion   hardware.    IMPRESSION:  Left lower lobe consolidation likely representing pneumonia. Tree-in-bud   opacities within posterior left upper lobe. Recommend follow-up chest CT   in 1-3 months to determine resolution.    Emphysema..    --- End of Report ---      < end of copied text >   Patient is a 73y old  Female who presents with a chief complaint of sob (2024 16:28)    HPI:  Patient is a 73-year-old history of COPD on 2L NC at home, hyperthyroid, breast CA, asthma, CHF, chronic lymphedema, wheelchair bound, presenting with difficulty breathing.  Patient has had cough and difficulty breathing for the past couple of days, and her son also having similar symptoms. ON the day of admission she had significant trouble breathing, was found by EMS to be hypoxic to 68% on 2 L nasal cannula.  Patient placed on CPAP by EMS, which improved oxygen saturation.  Patient denied chest pain, syncope, fevers at admission. Overnight blood cultures turned positive for MRSE, a dose of Vancomycin was given 1:47 ( blood cultures were received at lab 4:46, unclear if collected before antibiotics). ID consulted for further recs.    ED tmax 100.9, WBC 9.7    RVP hMPV+    UA 96 WBC, Many bacteria    Bcx ;  MRSE    CT Chest  Left lower lobe consolidation likely representing pneumonia. Tree-in-bud   opacities within posterior left upper lobe. Recommend follow-up chest CT   in 1-3 months to determine resolution.      prior hospital charts reviewed [  ]  primary team notes reviewed [x  ]  other consultant notes reviewed [ x ]    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      History of carpal tunnel release  right wrist      History of parathyroidectomy      S/P laminectomy  now bed and wheelchair ridden with severe pain          Allergies  Grapes (Hives)  No Known Drug Allergies  Peaches (Hives)  shellfish (Hives)    ANTIMICROBIALS (past 90 days)  MEDICATIONS  (STANDING):  azithromycin  IVPB   255 mL/Hr IV Intermittent (24 @ 03:20)    cefTRIAXone   IVPB   100 mL/Hr IV Intermittent (24 @ 02:38)    cefTRIAXone   IVPB   100 mL/Hr IV Intermittent (24 @ 03:58)    vancomycin  IVPB   250 mL/Hr IV Intermittent (24 @ 03:58)        cefTRIAXone   IVPB      MEDICATIONS  (STANDING):  albuterol    90 MICROgram(s) HFA Inhaler 2 every 6 hours  albuterol/ipratropium for Nebulization 3 every 6 hours  aspirin enteric coated 81 daily  atorvastatin 40 at bedtime  busPIRone 20 <User Schedule>  DULoxetine 60 daily  furosemide   Injectable 40 daily  gabapentin 100 two times a day  guaiFENesin  every 12 hours  heparin   Injectable 5000 every 12 hours  hydrALAZINE 50 three times a day  influenza  Vaccine (HIGH DOSE) 0.7 once  levothyroxine 125 daily  losartan 100 daily  methylPREDNISolone sodium succinate Injectable 20 three times a day  montelukast 10 daily  OLANZapine 10 at bedtime  propranolol 20 two times a day  tiotropium 2.5 MICROgram(s) Inhaler 2 daily    SOCIAL HISTORY: Lives at home with     FAMILY HISTORY:  Family history of myocardial infarction  mother  at age 67 and father at age 67 of MI's    Family history of hypertension  mother and father    Family history of diabetes mellitus (Sibling)  siblings    Family history of heart disease (Sibling)  brother has a PPM    Family history of thyroid cancer (Child)  daughter has thyroid cancer      REVIEW OF SYSTEMS  [  ] ROS unobtainable because:    [ x ] All other systems negative except as noted below:	    Constitutional:  [ ] fever [ ] chills  [ ] weight loss  [ ] weakness  Skin:  [ ] rash [ ] phlebitis	  Eyes: [ ] icterus [ ] pain  [ ] discharge	  ENMT: [ ] sore throat  [ ] thrush [ ] ulcers [ ] exudates  Respiratory: [ ] dyspnea [ ] hemoptysis [x ] cough [x ] sputum	  Cardiovascular:  [ ] chest pain [ ] palpitations [ ] edema	  Gastrointestinal:  [ ] nausea [ ] vomiting [ ] diarrhea [ ] constipation [ ] pain	  Genitourinary:  [ ] dysuria [ ] frequency [ ] hematuria [ ] discharge [ ] flank pain  [ ] incontinence  Musculoskeletal:  [ ] myalgias [ ] arthralgias [ ] arthritis  [ ] back pain  Neurological:  [ ] headache [ ] seizures  [ ] confusion/altered mental status  Psychiatric:  [ ] anxiety [ ] depression	  Hematology/Lymphatics:  [ ] lymphadenopathy  Endocrine:  [ ] adrenal [ ] thyroid  Allergic/Immunologic:	 [ ] transplant [ ] seasonal    Vital Signs Last 24 Hrs  T(F): 98.4 (24 @ 11:28), Max: 100.9 (24 @ 01:15)  Vital Signs Last 24 Hrs  HR: 83 (24 @ 11:28) (78 - 93)  BP: 160/78 (24 @ 11:28) (160/78 - 185/101)  RR: 20 (24 @ 11:28)  SpO2: 93% (24 @ 11:28) (93% - 99%)  Wt(kg): --    PHYSICAL EXAM:    General: Patient in NAD  HEENT: NCAT, EOMI, PERRL, no oral lesions  CV: S1+S2, no m/r/g appreciated   Lungs: No respiratory distress, CTAB  Abd: Soft, nontender, no guarding, no rebound tenderness, + bowel sounds   : No suprapubic tenderness  Neuro: Alert and oriented to time, place and person. No focal deficits noted.   Ext: No cyanosis, chronic lymphedema BLE  Skin: No rash, no phlebitis                              12.0   9.76  )-----------( 153      ( 2024 01:21 )             38.2   -    141  |  103  |  11  ----------------------------<  161<H>  4.4   |  28  |  0.54    Ca    9.9      2024 04:17    TPro  6.9  /  Alb  3.7  /  TBili  1.0  /  DBili  x   /  AST  12  /  ALT  13  /  AlkPhos  104  01-13    Urinalysis Basic - ( 2024 04:17 )    Color: x / Appearance: x / SG: x / pH: x  Gluc: 161 mg/dL / Ketone: x  / Bili: x / Urobili: x   Blood: x / Protein: x / Nitrite: x   Leuk Esterase: x / RBC: x / WBC x   Sq Epi: x / Non Sq Epi: x / Bacteria: x    MICROBIOLOGY:  Culture - Blood (collected 2024 00:45)  Source: .Blood Blood-Peripheral  Gram Stain (2024 23:35):    Growth in aerobic bottle: Gram Positive Cocci in Clusters  Preliminary Report (2024 23:35):    Growth in aerobic bottle: Gram Positive Cocci in Clusters    Culture - Blood (collected 2024 00:30)  Source: .Blood Blood-Peripheral  Gram Stain (2024 20:50):    Growth in aerobic bottle: Gram Positive Cocci in Clusters  Preliminary Report (2024 20:50):    Growth in aerobic bottle: Gram Positive Cocci in Clusters    Direct identification is available within approximately 3-5    hours either by Blood Panel Multiplexed PCR or Direct    MALDI-TOF. Details: [email or web address]  Organism: Blood Culture PCR (2024 22:08)  Organism: Blood Culture PCR (2024 22:08)      Method Type: PCR      -  Staphylococcus epidermidis, Methicillin resistant: Detec              Rapid RVP Result: Detected ( @ 01:48)      RADIOLOGY:  imaging below personally reviewed and agree with findings    < from: CT Chest No Cont (24 @ 14:55) >    ACC: [id number] EXAM:  CT CHEST   ORDERED BY: LESLYE WALLACE     PROCEDURE DATE:  2024          INTERPRETATION:  CLINICAL INFORMATION: Shortness of breath    COMPARISON: CT chest 2023    CONTRAST/COMPLICATIONS:  IV Contrast: NONE  Oral Contrast: NONE  Complications: None reported at time of study completion    PROCEDURE:  CT of the Chest was performed.  Sagittal and coronal reformats were performed.    FINDINGS:    LUNGS AND AIRWAYS: Impaction of segmental branches of basilar left lower   lobe bronchus.  Emphysema. Left lower lobe consolidation. Tree-in-bud   opacities within posterior left upper lobe.  PLEURA: No pleural effusion.  MEDIASTINUM AND MIYA: No lymphadenopathy.  VESSELS: Dilated pulmonary artery measuring 4 cm representing pulmonary   hypertension. Thoracic aorta normal in course and caliber.  HEART: Cardiomegaly. Trace pericardial fluid. Aortic valve calcification.  CHEST WALL AND LOWER NECK: Within normal limits.  VISUALIZED UPPER ABDOMEN: Cholecystectomy. Atrophic pancreas with   scattered calcifications, likely sequela of chronic pancreatitis. Left   renal cyst.  BONES: Degenerative changes. Partially visualized cervical spinal fusion   hardware.    IMPRESSION:  Left lower lobe consolidation likely representing pneumonia. Tree-in-bud   opacities within posterior left upper lobe. Recommend follow-up chest CT   in 1-3 months to determine resolution.    Emphysema..    --- End of Report ---      < end of copied text >

## 2024-01-14 NOTE — PROGRESS NOTE ADULT - ASSESSMENT
_________________________________________________________________________________________  ========>>  M E D I C A L   A T T E N D I N G    F O L L O W  U P  N O T E  <<=========  -----------------------------------------------------------------------------------------------------    - Patient seen and examined by me earlier today.   - In summary,  SHEKHAR VASQUEZ is a 73y year old woman admitted with SOB  - Patient today overall doing ok, comfortable, eating OK. overall feels better     ==================>> REVIEW OF SYSTEM <<=================    GEN: no fever, no chills, + some headache today > getting tylenol    RESP: no SOB at rest , no cough, no sputum  CVS: no chest pain, no palpitations  GI: no abdominal pain, no nausea, no constipation, no diarrhea  : no dysuria, no frequency  Neuro: no headache, no dizziness    ==================>> PHYSICAL EXAM <<=================    GEN: A&O X 3 , NAD , comfortable, pleasant, calm   HEENT: NCAT, PERRL, MMM, hearing intact  CVS: S1S2 , regular , No M/R/G appreciated  PULM: CTA B/L,  limited exam due to body habitus.   ABD.: soft. non tender, non distended,  obese   Extrem: intact pulses , ++ chronic lymphedema changes           ( Note written / Date of service 01-14-24 ( This is certified to be the same as "ENTERED" date above ( for billing purposes)))    ==================>> MEDICATIONS <<====================    MEDICATIONS  (STANDING):  albuterol    90 MICROgram(s) HFA Inhaler 2 Puff(s) Inhalation every 6 hours  albuterol/ipratropium for Nebulization 3 milliLiter(s) Nebulizer every 6 hours  aspirin enteric coated 81 milliGRAM(s) Oral daily  atorvastatin 40 milliGRAM(s) Oral at bedtime  busPIRone 20 milliGRAM(s) Oral <User Schedule>  cefTRIAXone   IVPB      DULoxetine 60 milliGRAM(s) Oral daily  furosemide   Injectable 40 milliGRAM(s) IV Push daily  gabapentin 100 milliGRAM(s) Oral two times a day  guaiFENesin  milliGRAM(s) Oral every 12 hours  heparin   Injectable 5000 Unit(s) SubCutaneous every 12 hours  hydrALAZINE 50 milliGRAM(s) Oral three times a day  influenza  Vaccine (HIGH DOSE) 0.7 milliLiter(s) IntraMuscular once  levothyroxine 125 MICROGram(s) Oral daily  losartan 100 milliGRAM(s) Oral daily  methylPREDNISolone sodium succinate Injectable 20 milliGRAM(s) IV Push three times a day  montelukast 10 milliGRAM(s) Oral daily  OLANZapine 10 milliGRAM(s) Oral at bedtime  propranolol 20 milliGRAM(s) Oral two times a day  tiotropium 2.5 MICROgram(s) Inhaler 2 Puff(s) Inhalation daily    MEDICATIONS  (PRN):  acetaminophen     Tablet .. 650 milliGRAM(s) Oral every 6 hours PRN Temp greater or equal to 38C (100.4F), Mild Pain (1 - 3)    ___________  Active diet:  Diet, Regular  ___________________    ==================>> VITAL SIGNS <<==================    T(C): 36.9 (01-14-24 @ 11:28), Max: 36.9 (01-13-24 @ 22:30)  HR: 83 (01-14-24 @ 11:28) (80 - 93)  BP: 160/78 (01-14-24 @ 11:28) (160/78 - 185/101)  BP(mean): 110 (01-13-24 @ 16:50)  RR: 20 (01-14-24 @ 11:28) (19 - 20)  SpO2: 93% (01-14-24 @ 11:28) (93% - 99%)     I&O's Summary    14 Jan 2024 07:01  -  14 Jan 2024 15:53  --------------------------------------------------------  IN: 520 mL / OUT: 1450 mL / NET: -930 mL       ==================>> LAB AND IMAGING <<==================                        12.0   9.76  )-----------( 153      ( 13 Jan 2024 01:21 )             38.2        01-14    141  |  103  |  11  ----------------------------<  161<H>  4.4   |  28  |  0.54    Ca    9.9      14 Jan 2024 04:17    TPro  6.9  /  Alb  3.7  /  TBili  1.0  /  DBili  x   /  AST  12  /  ALT  13  /  AlkPhos  104  01-13    PT/INR - ( 13 Jan 2024 01:21 )   PT: 12.2 sec;   INR: 1.17 ratio    PTT - ( 13 Jan 2024 01:21 )  PTT:74.2 sec               Urinalysis:  01-13-24 @ 01:37  Leuk. Est.: Moderate  Nirite: Positive  WBC: 96  Blood: Negative     salt Crystal: --     calcium crystal: --     Bili: Negative     cast: 1  color: Yellow  Bacteria: Many  Epith. cell: 2  Yeast: --     Ketone: Negative     Protein: Negative     Glucose: Negative     sperm: --     Spec.Gravity: 1.016    ____________________________    M I C R O B I O L O G Y :    Culture - Blood (collected 13 Jan 2024 00:45)  Source: .Blood Blood-Peripheral  Gram Stain (13 Jan 2024 23:35):    Growth in aerobic bottle: Gram Positive Cocci in Clusters  Preliminary Report (13 Jan 2024 23:35):    Growth in aerobic bottle: Gram Positive Cocci in Clusters    Culture - Blood (collected 13 Jan 2024 00:30)  Source: .Blood Blood-Peripheral  Gram Stain (13 Jan 2024 20:50):    Growth in aerobic bottle: Gram Positive Cocci in Clusters  Preliminary Report (13 Jan 2024 20:50):    Growth in aerobic bottle: Gram Positive Cocci in Clusters    Direct identification is available within approximately 3-5    hours either by Blood Panel Multiplexed PCR or Direct    MALDI-TOF. Details: [email or web address]  Organism: Blood Culture PCR (13 Jan 2024 22:08)  Organism: Blood Culture PCR (13 Jan 2024 22:08)    Sensitivities:      Method Type: PCR      -  Staphylococcus epidermidis, Methicillin resistant: Detec    SARS-CoV-2: NotDetec (01-13-24 @ 01:48)  hMPV (RapRVP): Detected (01.13.24 @ 01:48)    < from: CT Chest No Cont (01.13.24 @ 14:55) >  IMPRESSION:  Left lower lobe consolidation likely representing pneumonia. Tree-in-bud   opacities within posterior left upper lobe. Recommend follow-up chest CT   in 1-3 months to determine resolution.  Emphysema..  < end of copied text >    ___________________________________________________________________________________  ===============>>  A S S E S S M E N T   A N D   P L A N <<===============  ------------------------------------------------------------------------------------------    · Assessment	   73-year-old woman with h/o  COPD on 2L NC at home, hypothyroid,  breast CA, asthma, chronic lymphedema, wheelchair bound, obesity       presented with difficulty breathing./   had cough and difficulty breathing for the past couple of days,        then had significant trouble breathing, . had  worsened     was found by EMS to be hypoxic to 68% on 2 L nasal cannula.  /  was  placed on CPAP by EMS, which improved oxygen sat/ /  denies chest pain, syncope, fevers.      admitted wth sob, + HMPV    acute  copd  exacerbation.  on home oxygen        on  iv sterod. /  Proventil   spiriva         CT showing pneumonia         MRSE Bacteremia   Pulm and ID appreciated  on antibiotics   follow cultures/ sensitivities       h/o   acute on  c/c diastolic  chf    on iv lasix, per  card       HTN.  HLD    Continue Current medications otherwise and monitor.    adjust medications as needed per cardio     h/o esophageal   dysmotility   c/c  lymphedema       continue current management    --------------------------------------------  Case discussed with patient, RN   Education given on findings and plan of care  ___________________________  H. JANICE Welch.  Pager: [phone]       _________________________________________________________________________________________  ========>>  M E D I C A L   A T T E N D I N G    F O L L O W  U P  N O T E  <<=========  -----------------------------------------------------------------------------------------------------    - Patient seen and examined by me earlier today.   - In summary,  SHEKHAR VASQUEZ is a 73y year old woman admitted with SOB  - Patient today overall doing ok, comfortable, eating OK. overall feels better     ==================>> REVIEW OF SYSTEM <<=================    GEN: no fever, no chills, + some headache today > getting tylenol    RESP: no SOB at rest , no cough, no sputum  CVS: no chest pain, no palpitations  GI: no abdominal pain, no nausea, no constipation, no diarrhea  : no dysuria, no frequency  Neuro: no headache, no dizziness    ==================>> PHYSICAL EXAM <<=================    GEN: A&O X 3 , NAD , comfortable, pleasant, calm   HEENT: NCAT, PERRL, MMM, hearing intact  CVS: S1S2 , regular , No M/R/G appreciated  PULM: CTA B/L,  limited exam due to body habitus.   ABD.: soft. non tender, non distended,  obese   Extrem: intact pulses , ++ chronic lymphedema changes           ( Note written / Date of service 01-14-24 ( This is certified to be the same as "ENTERED" date above ( for billing purposes)))    ==================>> MEDICATIONS <<====================    MEDICATIONS  (STANDING):  albuterol    90 MICROgram(s) HFA Inhaler 2 Puff(s) Inhalation every 6 hours  albuterol/ipratropium for Nebulization 3 milliLiter(s) Nebulizer every 6 hours  aspirin enteric coated 81 milliGRAM(s) Oral daily  atorvastatin 40 milliGRAM(s) Oral at bedtime  busPIRone 20 milliGRAM(s) Oral <User Schedule>  cefTRIAXone   IVPB      DULoxetine 60 milliGRAM(s) Oral daily  furosemide   Injectable 40 milliGRAM(s) IV Push daily  gabapentin 100 milliGRAM(s) Oral two times a day  guaiFENesin  milliGRAM(s) Oral every 12 hours  heparin   Injectable 5000 Unit(s) SubCutaneous every 12 hours  hydrALAZINE 50 milliGRAM(s) Oral three times a day  influenza  Vaccine (HIGH DOSE) 0.7 milliLiter(s) IntraMuscular once  levothyroxine 125 MICROGram(s) Oral daily  losartan 100 milliGRAM(s) Oral daily  methylPREDNISolone sodium succinate Injectable 20 milliGRAM(s) IV Push three times a day  montelukast 10 milliGRAM(s) Oral daily  OLANZapine 10 milliGRAM(s) Oral at bedtime  propranolol 20 milliGRAM(s) Oral two times a day  tiotropium 2.5 MICROgram(s) Inhaler 2 Puff(s) Inhalation daily    MEDICATIONS  (PRN):  acetaminophen     Tablet .. 650 milliGRAM(s) Oral every 6 hours PRN Temp greater or equal to 38C (100.4F), Mild Pain (1 - 3)    ___________  Active diet:  Diet, Regular  ___________________    ==================>> VITAL SIGNS <<==================    T(C): 36.9 (01-14-24 @ 11:28), Max: 36.9 (01-13-24 @ 22:30)  HR: 83 (01-14-24 @ 11:28) (80 - 93)  BP: 160/78 (01-14-24 @ 11:28) (160/78 - 185/101)  BP(mean): 110 (01-13-24 @ 16:50)  RR: 20 (01-14-24 @ 11:28) (19 - 20)  SpO2: 93% (01-14-24 @ 11:28) (93% - 99%)     I&O's Summary    14 Jan 2024 07:01  -  14 Jan 2024 15:53  --------------------------------------------------------  IN: 520 mL / OUT: 1450 mL / NET: -930 mL       ==================>> LAB AND IMAGING <<==================                        12.0   9.76  )-----------( 153      ( 13 Jan 2024 01:21 )             38.2        01-14    141  |  103  |  11  ----------------------------<  161<H>  4.4   |  28  |  0.54    Ca    9.9      14 Jan 2024 04:17    TPro  6.9  /  Alb  3.7  /  TBili  1.0  /  DBili  x   /  AST  12  /  ALT  13  /  AlkPhos  104  01-13    PT/INR - ( 13 Jan 2024 01:21 )   PT: 12.2 sec;   INR: 1.17 ratio    PTT - ( 13 Jan 2024 01:21 )  PTT:74.2 sec               Urinalysis:  01-13-24 @ 01:37  Leuk. Est.: Moderate  Nirite: Positive  WBC: 96  Blood: Negative     salt Crystal: --     calcium crystal: --     Bili: Negative     cast: 1  color: Yellow  Bacteria: Many  Epith. cell: 2  Yeast: --     Ketone: Negative     Protein: Negative     Glucose: Negative     sperm: --     Spec.Gravity: 1.016    ____________________________    M I C R O B I O L O G Y :    Culture - Blood (collected 13 Jan 2024 00:45)  Source: .Blood Blood-Peripheral  Gram Stain (13 Jan 2024 23:35):    Growth in aerobic bottle: Gram Positive Cocci in Clusters  Preliminary Report (13 Jan 2024 23:35):    Growth in aerobic bottle: Gram Positive Cocci in Clusters    Culture - Blood (collected 13 Jan 2024 00:30)  Source: .Blood Blood-Peripheral  Gram Stain (13 Jan 2024 20:50):    Growth in aerobic bottle: Gram Positive Cocci in Clusters  Preliminary Report (13 Jan 2024 20:50):    Growth in aerobic bottle: Gram Positive Cocci in Clusters    Direct identification is available within approximately 3-5    hours either by Blood Panel Multiplexed PCR or Direct    MALDI-TOF. Details: [email or web address]  Organism: Blood Culture PCR (13 Jan 2024 22:08)  Organism: Blood Culture PCR (13 Jan 2024 22:08)    Sensitivities:      Method Type: PCR      -  Staphylococcus epidermidis, Methicillin resistant: Detec    SARS-CoV-2: NotDetec (01-13-24 @ 01:48)  hMPV (RapRVP): Detected (01.13.24 @ 01:48)    < from: CT Chest No Cont (01.13.24 @ 14:55) >  IMPRESSION:  Left lower lobe consolidation likely representing pneumonia. Tree-in-bud   opacities within posterior left upper lobe. Recommend follow-up chest CT   in 1-3 months to determine resolution.  Emphysema..  < end of copied text >    ___________________________________________________________________________________  ===============>>  A S S E S S M E N T   A N D   P L A N <<===============  ------------------------------------------------------------------------------------------    · Assessment	   73-year-old woman with h/o  COPD on 2L NC at home, hypothyroid,  breast CA, asthma, chronic lymphedema, wheelchair bound, obesity       presented with difficulty breathing./   had cough and difficulty breathing for the past couple of days,        then had significant trouble breathing, . had  worsened     was found by EMS to be hypoxic to 68% on 2 L nasal cannula.  /  was  placed on CPAP by EMS, which improved oxygen sat/ /  denies chest pain, syncope, fevers.      admitted wth sob, + HMPV    acute  copd  exacerbation.  on home oxygen        on  iv sterod. /  Proventil   spiriva         CT showing pneumonia         MRSE Bacteremia   Pulm and ID appreciated  on antibiotics   follow cultures/ sensitivities       h/o   acute on  c/c diastolic  chf    on iv lasix, per  card       HTN.  HLD    Continue Current medications otherwise and monitor.    adjust medications as needed per cardio     h/o esophageal   dysmotility   c/c  lymphedema       continue current management    --------------------------------------------  Case discussed with patient, RN   Education given on findings and plan of care  ___________________________  H. JANICE Welch.  Pager: [phone]

## 2024-01-14 NOTE — PATIENT PROFILE ADULT - CAREGIVER ADDRESS
[phone] [place] [id number] [place] [id number]

## 2024-01-14 NOTE — PROGRESS NOTE ADULT - SUBJECTIVE AND OBJECTIVE BOX
SHEKHAR VASQUEZ  MRN#: [id number]  Subjective:   pulmonary progress note  : SOB , Acute COPD  exacerbation , hypoxic in acute respiratory Failure , date of service is 2024   73-year-old          history of COPD on 2L NC at home, hyperthyroid, breast CA, asthma, CHF, chronic lymphedema, wheelchair bound,         presenting with difficulty breathing.     pt t has had cough and difficulty breathing for the past couple of days,  then had significant trouble breathing,      was found by EMS to be hypoxic to 68% on 2 L nasal cannula.     pt    was  placed on CPAP by EMS, which improved oxygen saturation., patient is less lethargic on BiPAP over night acceptable 02 saturation , no GI symptoms .no fever still with coughing , mild wheezing        pt  denies chest pain, syncope, fevers. (2024 09:52)    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      History of carpal tunnel release  right wrist      History of parathyroidectomy      S/P laminectomy  now bed and wheelchair ridden with severe pain          FAMILY HISTORY:  Family history of myocardial infarction  mother  at age 67 and father at age 67 of MI's      OBJECTIVE:  ICU Vital Signs Last 24 Hrs  T(C): 36.9 (2024 11:28), Max: 37.2 (2024 13:58)  T(F): 98.4 (2024 11:28), Max: 99 (2024 13:58)  HR: 83 (2024 11:28) (78 - 93)  BP: 160/78 (2024 11:28) (160/78 - 185/101)  BP(mean): 110 (2024 16:50) (110 - 110)  ABP: --  ABP(mean): --  RR: 20 (2024 11:28) (19 - 20)  SpO2: 93% (2024 11:28) (93% - 99%)    O2 Parameters below as of 2024 11:28  Patient On (Oxygen Delivery Method): nasal cannula w/ humidification             @ 07:01  -   @ 13:53  --------------------------------------------------------  IN: 520 mL / OUT: 1200 mL / NET: -680 mL        PHYSICAL EXAM:Daily   morbidly obese female on 3 liter nasal 02 saturation is 93%  Daily   HEENT:     + NCAT  + EOMI  - Conjuctival edema   - Icterus   - Thrush   - ETT  - NGT/OGT  Neck:         + FROM    + JVD     - Nodes     - Masses    + Mid-line trachea   - Tracheostomy  Chest:       kyphotic deformities   Lungs:          + CTA   + Rhonchi    - Rales    + Wheezing     - Decreased BS   - Dullness R L  Cardiac:       + S1 + S2    + RRR   - Irregular   - S3  - S4    - Murmurs   - Rub   - Hamman’s sign   Abdomen:    + BS     + Soft    + Non-tender     - Distended    - Organomegaly  - PEG  Extremities:   - Cyanosis U/L   - Clubbing  U/L  + LE/UE Edema   + Capillary refill    + Pulses   Neuro:        + Awake   +  Alert   - Confused   - Lethargic   - Sedated   + Generalized Weakness  Skin:        - Rashes    - Erythema   + Normal incisions   + IV sites intact  - Sacral decubit      HOSPITAL MEDICATIONS: All mediciations reviewed and analyzed  MEDICATIONS  (STANDING):  albuterol    90 MICROgram(s) HFA Inhaler 2 Puff(s) Inhalation every 6 hours  albuterol/ipratropium for Nebulization 3 milliLiter(s) Nebulizer every 6 hours  aspirin enteric coated 81 milliGRAM(s) Oral daily  atorvastatin 40 milliGRAM(s) Oral at bedtime  busPIRone 20 milliGRAM(s) Oral <User Schedule>  cefTRIAXone   IVPB      DULoxetine 60 milliGRAM(s) Oral daily  furosemide   Injectable 40 milliGRAM(s) IV Push daily  gabapentin 100 milliGRAM(s) Oral two times a day  guaiFENesin  milliGRAM(s) Oral every 12 hours  heparin   Injectable 5000 Unit(s) SubCutaneous every 12 hours  hydrALAZINE 50 milliGRAM(s) Oral three times a day  influenza  Vaccine (HIGH DOSE) 0.7 milliLiter(s) IntraMuscular once  levothyroxine 125 MICROGram(s) Oral daily  losartan 100 milliGRAM(s) Oral daily  methylPREDNISolone sodium succinate Injectable 20 milliGRAM(s) IV Push three times a day  montelukast 10 milliGRAM(s) Oral daily  OLANZapine 10 milliGRAM(s) Oral at bedtime  propranolol 20 milliGRAM(s) Oral two times a day  tiotropium 2.5 MICROgram(s) Inhaler 2 Puff(s) Inhalation daily    MEDICATIONS  (PRN):    LABS: All Lab data reviewed and analyzed                        12.0   9.76  )-----------( 153      ( 2024 01:21 )             38.2    -14    141  |  103  |  11  ----------------------------<  161<H>  4.4   |  28  |  0.54    Ca    9.9      2024 04:17    TPro  6.9  /  Alb  3.7  /  TBili  1.0  /  DBili  x   /  AST  12  /  ALT  13  /  AlkPhos  104  01-13    PT/INR - ( 2024 01:21 )   PT: 12.2 sec;   INR: 1.17 ratio         PTT - ( 2024 01:21 )  PTT:74.2 sec LIVER FUNCTIONS - ( 2024 01:21 )  Alb: 3.7 g/dL / Pro: 6.9 g/dL / ALK PHOS: 104 U/L / ALT: 13 U/L / AST: 12 U/L / GGT: x           RADIOLOGY: - Reviewed and analyzed  SHEKHAR VASQUEZ  MRN#: [id number]  Subjective:   pulmonary progress note  : SOB , Acute COPD  exacerbation , hypoxic in acute respiratory Failure , date of service is 2024   73-year-old          history of COPD on 2L NC at home, hyperthyroid, breast CA, asthma, CHF, chronic lymphedema, wheelchair bound,         presenting with difficulty breathing.     pt t has had cough and difficulty breathing for the past couple of days,  then had significant trouble breathing,      was found by EMS to be hypoxic to 68% on 2 L nasal cannula.     pt    was  placed on CPAP by EMS, which improved oxygen saturation., patient is less lethargic on BiPAP over night acceptable 02 saturation , no GI symptoms .no fever still with coughing , mild wheezing        pt  denies chest pain, syncope, fevers. (2024 09:52)    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      History of carpal tunnel release  right wrist      History of parathyroidectomy      S/P laminectomy  now bed and wheelchair ridden with severe pain          FAMILY HISTORY:  Family history of myocardial infarction  mother  at age 67 and father at age 67 of MI's      OBJECTIVE:  ICU Vital Signs Last 24 Hrs  T(C): 36.9 (2024 11:28), Max: 37.2 (2024 13:58)  T(F): 98.4 (2024 11:28), Max: 99 (2024 13:58)  HR: 83 (2024 11:28) (78 - 93)  BP: 160/78 (2024 11:28) (160/78 - 185/101)  BP(mean): 110 (2024 16:50) (110 - 110)  ABP: --  ABP(mean): --  RR: 20 (2024 11:28) (19 - 20)  SpO2: 93% (2024 11:28) (93% - 99%)    O2 Parameters below as of 2024 11:28  Patient On (Oxygen Delivery Method): nasal cannula w/ humidification             @ 07:01  -   @ 13:53  --------------------------------------------------------  IN: 520 mL / OUT: 1200 mL / NET: -680 mL        PHYSICAL EXAM:Daily   morbidly obese female on 3 liter nasal 02 saturation is 93%  Daily   HEENT:     + NCAT  + EOMI  - Conjuctival edema   - Icterus   - Thrush   - ETT  - NGT/OGT  Neck:         + FROM    + JVD     - Nodes     - Masses    + Mid-line trachea   - Tracheostomy  Chest:       kyphotic deformities   Lungs:          + CTA   + Rhonchi    - Rales    + Wheezing     - Decreased BS   - Dullness R L  Cardiac:       + S1 + S2    + RRR   - Irregular   - S3  - S4    - Murmurs   - Rub   - Hamman’s sign   Abdomen:    + BS     + Soft    + Non-tender     - Distended    - Organomegaly  - PEG  Extremities:   - Cyanosis U/L   - Clubbing  U/L  + LE/UE Edema   + Capillary refill    + Pulses   Neuro:        + Awake   +  Alert   - Confused   - Lethargic   - Sedated   + Generalized Weakness  Skin:        - Rashes    - Erythema   + Normal incisions   + IV sites intact  - Sacral decubit      HOSPITAL MEDICATIONS: All mediciations reviewed and analyzed  MEDICATIONS  (STANDING):  albuterol    90 MICROgram(s) HFA Inhaler 2 Puff(s) Inhalation every 6 hours  albuterol/ipratropium for Nebulization 3 milliLiter(s) Nebulizer every 6 hours  aspirin enteric coated 81 milliGRAM(s) Oral daily  atorvastatin 40 milliGRAM(s) Oral at bedtime  busPIRone 20 milliGRAM(s) Oral <User Schedule>  cefTRIAXone   IVPB      DULoxetine 60 milliGRAM(s) Oral daily  furosemide   Injectable 40 milliGRAM(s) IV Push daily  gabapentin 100 milliGRAM(s) Oral two times a day  guaiFENesin  milliGRAM(s) Oral every 12 hours  heparin   Injectable 5000 Unit(s) SubCutaneous every 12 hours  hydrALAZINE 50 milliGRAM(s) Oral three times a day  influenza  Vaccine (HIGH DOSE) 0.7 milliLiter(s) IntraMuscular once  levothyroxine 125 MICROGram(s) Oral daily  losartan 100 milliGRAM(s) Oral daily  methylPREDNISolone sodium succinate Injectable 20 milliGRAM(s) IV Push three times a day  montelukast 10 milliGRAM(s) Oral daily  OLANZapine 10 milliGRAM(s) Oral at bedtime  propranolol 20 milliGRAM(s) Oral two times a day  tiotropium 2.5 MICROgram(s) Inhaler 2 Puff(s) Inhalation daily    MEDICATIONS  (PRN):    LABS: All Lab data reviewed and analyzed                        12.0   9.76  )-----------( 153      ( 2024 01:21 )             38.2    -14    141  |  103  |  11  ----------------------------<  161<H>  4.4   |  28  |  0.54    Ca    9.9      2024 04:17    TPro  6.9  /  Alb  3.7  /  TBili  1.0  /  DBili  x   /  AST  12  /  ALT  13  /  AlkPhos  104  01-13    PT/INR - ( 2024 01:21 )   PT: 12.2 sec;   INR: 1.17 ratio         PTT - ( 2024 01:21 )  PTT:74.2 sec LIVER FUNCTIONS - ( 2024 01:21 )  Alb: 3.7 g/dL / Pro: 6.9 g/dL / ALK PHOS: 104 U/L / ALT: 13 U/L / AST: 12 U/L / GGT: x           RADIOLOGY: - Reviewed and analyzed

## 2024-01-14 NOTE — PATIENT PROFILE ADULT - FALL HARM RISK - HARM RISK INTERVENTIONS
Assistance with ambulation/Assistance OOB with selected safe patient handling equipment/Communicate Risk of Fall with Harm to all staff/Discuss with provider need for PT consult/Monitor gait and stability/Provide patient with walking aids - walker, cane, crutches/Reinforce activity limits and safety measures with patient and family/Tailored Fall Risk Interventions/Visual Cue: Yellow wristband and red socks/Bed in lowest position, wheels locked, appropriate side rails in place/Call bell, personal items and telephone in reach/Instruct patient to call for assistance before getting out of bed or chair/Non-slip footwear when patient is out of bed/[place] to call system/Physically safe environment - no spills, clutter or unnecessary equipment/Purposeful Proactive Rounding/Room/bathroom lighting operational, light cord in reach Assistance with ambulation/Assistance OOB with selected safe patient handling equipment/Communicate Risk of Fall with Harm to all staff/Discuss with provider need for PT consult/Monitor gait and stability/Provide patient with walking aids - walker, cane, crutches/Reinforce activity limits and safety measures with patient and family/Tailored Fall Risk Interventions/Visual Cue: Yellow wristband and red socks/Bed in lowest position, wheels locked, appropriate side rails in place/Call bell, personal items and telephone in reach/Instruct patient to call for assistance before getting out of bed or chair/Non-slip footwear when patient is out of bed/[place] to call system/Physically safe environment - no spills, clutter or unnecessary equipment/Purposeful Proactive Rounding/Room/bathroom lighting operational, light cord in reach

## 2024-01-14 NOTE — CONSULT NOTE ADULT - ATTENDING COMMENTS
73-year-old female with a past medical history of COPD on 2 L nasal cannula at home, hypothyroidism, breast cancer, asthma, CHF, chronic lymphedema who was admitted to the hospital due to shortness of breath.    Patient reports shortness of breath over the past few days.  Reports son is having similar symptoms.    On day of admission, EMS called to her home where she was found to be 68% on 2 L.  Patient was then placed on CPAP and brought to the hospital.  Patient denied any other symptoms on admission including fever, syncope, chest pain.  Blood cultures were obtained on admission growing MRSE, patient was given vancomycin.    In the ED, Tmax 100.9, no leukocytosis on labs.  RVP positive for human metapneumovirus.  Urinalysis also with pyuria, many bacteria.  CT chest was obtained showing a left lower lobe consolidation.    #Positive blood cultures, MRSE detection  #Human metapneumovirus infection  #Abnormal lung imaging, left lower lobe opacity    Recommendation  Less likely to have a true MRSE bacteremia, favor contamination–would discontinue vancomycin  Patient also without any prosthetic devices  Can continue ceftriaxone 1 g every 24 hours at this time  Obtain sputum culture, Legionella, pneumococcal urine antigen  Follow repeat blood cultures  Follow fever curve and WBC count    Refugio Osman MD  Division of Infectious Diseases

## 2024-01-15 LAB
-  AMPICILLIN/SULBACTAM: SIGNIFICANT CHANGE UP
-  CEFAZOLIN: SIGNIFICANT CHANGE UP
-  CLINDAMYCIN: SIGNIFICANT CHANGE UP
-  ERYTHROMYCIN: SIGNIFICANT CHANGE UP
-  GENTAMICIN: SIGNIFICANT CHANGE UP
-  OXACILLIN: SIGNIFICANT CHANGE UP
-  PENICILLIN: SIGNIFICANT CHANGE UP
-  RIFAMPIN: SIGNIFICANT CHANGE UP
-  TETRACYCLINE: SIGNIFICANT CHANGE UP
-  TRIMETHOPRIM/SULFAMETHOXAZOLE: SIGNIFICANT CHANGE UP
-  VANCOMYCIN: SIGNIFICANT CHANGE UP
BASOPHILS # BLD AUTO: 0 K/UL — SIGNIFICANT CHANGE UP (ref 0–0.2)
BASOPHILS # BLD AUTO: 0 K/UL — SIGNIFICANT CHANGE UP (ref 0–0.2)
BASOPHILS NFR BLD AUTO: 0 % — SIGNIFICANT CHANGE UP (ref 0–2)
BASOPHILS NFR BLD AUTO: 0 % — SIGNIFICANT CHANGE UP (ref 0–2)
CULTURE RESULTS: ABNORMAL
EOSINOPHIL # BLD AUTO: 0 K/UL — SIGNIFICANT CHANGE UP (ref 0–0.5)
EOSINOPHIL # BLD AUTO: 0 K/UL — SIGNIFICANT CHANGE UP (ref 0–0.5)
EOSINOPHIL NFR BLD AUTO: 0 % — SIGNIFICANT CHANGE UP (ref 0–6)
EOSINOPHIL NFR BLD AUTO: 0 % — SIGNIFICANT CHANGE UP (ref 0–6)
HCT VFR BLD CALC: 40.1 % — SIGNIFICANT CHANGE UP (ref 34.5–45)
HCT VFR BLD CALC: 40.1 % — SIGNIFICANT CHANGE UP (ref 34.5–45)
HGB BLD-MCNC: 12.9 G/DL — SIGNIFICANT CHANGE UP (ref 11.5–15.5)
HGB BLD-MCNC: 12.9 G/DL — SIGNIFICANT CHANGE UP (ref 11.5–15.5)
LYMPHOCYTES # BLD AUTO: 0.96 K/UL — LOW (ref 1–3.3)
LYMPHOCYTES # BLD AUTO: 0.96 K/UL — LOW (ref 1–3.3)
LYMPHOCYTES # BLD AUTO: 10.5 % — LOW (ref 13–44)
LYMPHOCYTES # BLD AUTO: 10.5 % — LOW (ref 13–44)
MANUAL SMEAR VERIFICATION: SIGNIFICANT CHANGE UP
MANUAL SMEAR VERIFICATION: SIGNIFICANT CHANGE UP
MCHC RBC-ENTMCNC: 29.5 PG — SIGNIFICANT CHANGE UP (ref 27–34)
MCHC RBC-ENTMCNC: 29.5 PG — SIGNIFICANT CHANGE UP (ref 27–34)
MCHC RBC-ENTMCNC: 32.2 GM/DL — SIGNIFICANT CHANGE UP (ref 32–36)
MCHC RBC-ENTMCNC: 32.2 GM/DL — SIGNIFICANT CHANGE UP (ref 32–36)
MCV RBC AUTO: 91.8 FL — SIGNIFICANT CHANGE UP (ref 80–100)
MCV RBC AUTO: 91.8 FL — SIGNIFICANT CHANGE UP (ref 80–100)
METHOD TYPE: SIGNIFICANT CHANGE UP
MONOCYTES # BLD AUTO: 0.24 K/UL — SIGNIFICANT CHANGE UP (ref 0–0.9)
MONOCYTES # BLD AUTO: 0.24 K/UL — SIGNIFICANT CHANGE UP (ref 0–0.9)
MONOCYTES NFR BLD AUTO: 2.6 % — SIGNIFICANT CHANGE UP (ref 2–14)
MONOCYTES NFR BLD AUTO: 2.6 % — SIGNIFICANT CHANGE UP (ref 2–14)
NEUTROPHILS # BLD AUTO: 7.81 K/UL — HIGH (ref 1.8–7.4)
NEUTROPHILS # BLD AUTO: 7.81 K/UL — HIGH (ref 1.8–7.4)
NEUTROPHILS NFR BLD AUTO: 83.3 % — HIGH (ref 43–77)
NEUTROPHILS NFR BLD AUTO: 83.3 % — HIGH (ref 43–77)
NEUTS BAND # BLD: 1.8 % — SIGNIFICANT CHANGE UP (ref 0–8)
NEUTS BAND # BLD: 1.8 % — SIGNIFICANT CHANGE UP (ref 0–8)
ORGANISM # SPEC MICROSCOPIC CNT: ABNORMAL
PLAT MORPH BLD: NORMAL — SIGNIFICANT CHANGE UP
PLAT MORPH BLD: NORMAL — SIGNIFICANT CHANGE UP
PLATELET # BLD AUTO: 188 K/UL — SIGNIFICANT CHANGE UP (ref 150–400)
PLATELET # BLD AUTO: 188 K/UL — SIGNIFICANT CHANGE UP (ref 150–400)
RBC # BLD: 4.37 M/UL — SIGNIFICANT CHANGE UP (ref 3.8–5.2)
RBC # BLD: 4.37 M/UL — SIGNIFICANT CHANGE UP (ref 3.8–5.2)
RBC # FLD: 14.2 % — SIGNIFICANT CHANGE UP (ref 10.3–14.5)
RBC # FLD: 14.2 % — SIGNIFICANT CHANGE UP (ref 10.3–14.5)
RBC BLD AUTO: NORMAL — SIGNIFICANT CHANGE UP
RBC BLD AUTO: NORMAL — SIGNIFICANT CHANGE UP
SPECIMEN SOURCE: SIGNIFICANT CHANGE UP
VARIANT LYMPHS # BLD: 1.8 % — SIGNIFICANT CHANGE UP (ref 0–6)
VARIANT LYMPHS # BLD: 1.8 % — SIGNIFICANT CHANGE UP (ref 0–6)
WBC # BLD: 9.18 K/UL — SIGNIFICANT CHANGE UP (ref 3.8–10.5)
WBC # BLD: 9.18 K/UL — SIGNIFICANT CHANGE UP (ref 3.8–10.5)
WBC # FLD AUTO: 9.18 K/UL — SIGNIFICANT CHANGE UP (ref 3.8–10.5)
WBC # FLD AUTO: 9.18 K/UL — SIGNIFICANT CHANGE UP (ref 3.8–10.5)

## 2024-01-15 RX ORDER — LANOLIN ALCOHOL/MO/W.PET/CERES
3 CREAM (GRAM) TOPICAL ONCE
Refills: 0 | Status: COMPLETED | OUTPATIENT
Start: 2024-01-15 | End: 2024-01-15

## 2024-01-15 RX ADMIN — Medication 3 MILLIGRAM(S): at 23:11

## 2024-01-15 RX ADMIN — Medication 125 MICROGRAM(S): at 05:03

## 2024-01-15 RX ADMIN — HEPARIN SODIUM 5000 UNIT(S): 5000 INJECTION INTRAVENOUS; SUBCUTANEOUS at 05:03

## 2024-01-15 RX ADMIN — GABAPENTIN 100 MILLIGRAM(S): 400 CAPSULE ORAL at 05:02

## 2024-01-15 RX ADMIN — Medication 600 MILLIGRAM(S): at 05:02

## 2024-01-15 RX ADMIN — Medication 50 MILLIGRAM(S): at 21:22

## 2024-01-15 RX ADMIN — ATORVASTATIN CALCIUM 40 MILLIGRAM(S): 80 TABLET, FILM COATED ORAL at 21:22

## 2024-01-15 RX ADMIN — LOSARTAN POTASSIUM 100 MILLIGRAM(S): 100 TABLET, FILM COATED ORAL at 05:03

## 2024-01-15 RX ADMIN — Medication 20 MILLIGRAM(S): at 11:06

## 2024-01-15 RX ADMIN — Medication 3 MILLILITER(S): at 23:11

## 2024-01-15 RX ADMIN — Medication 3 MILLILITER(S): at 18:18

## 2024-01-15 RX ADMIN — Medication 3 MILLILITER(S): at 00:20

## 2024-01-15 RX ADMIN — OLANZAPINE 10 MILLIGRAM(S): 15 TABLET, FILM COATED ORAL at 21:22

## 2024-01-15 RX ADMIN — Medication 81 MILLIGRAM(S): at 11:05

## 2024-01-15 RX ADMIN — Medication 40 MILLIGRAM(S): at 05:04

## 2024-01-15 RX ADMIN — GABAPENTIN 100 MILLIGRAM(S): 400 CAPSULE ORAL at 18:20

## 2024-01-15 RX ADMIN — Medication 20 MILLIGRAM(S): at 21:23

## 2024-01-15 RX ADMIN — HEPARIN SODIUM 5000 UNIT(S): 5000 INJECTION INTRAVENOUS; SUBCUTANEOUS at 18:18

## 2024-01-15 RX ADMIN — Medication 600 MILLIGRAM(S): at 18:18

## 2024-01-15 RX ADMIN — DULOXETINE HYDROCHLORIDE 60 MILLIGRAM(S): 30 CAPSULE, DELAYED RELEASE ORAL at 11:06

## 2024-01-15 RX ADMIN — Medication 20 MILLIGRAM(S): at 14:58

## 2024-01-15 RX ADMIN — Medication 20 MILLIGRAM(S): at 05:03

## 2024-01-15 RX ADMIN — Medication 3 MILLILITER(S): at 05:02

## 2024-01-15 RX ADMIN — Medication 3 MILLILITER(S): at 11:05

## 2024-01-15 RX ADMIN — MONTELUKAST 10 MILLIGRAM(S): 4 TABLET, CHEWABLE ORAL at 11:05

## 2024-01-15 RX ADMIN — CEFTRIAXONE 100 MILLIGRAM(S): 500 INJECTION, POWDER, FOR SOLUTION INTRAMUSCULAR; INTRAVENOUS at 00:20

## 2024-01-15 RX ADMIN — Medication 1 APPLICATION(S): at 18:20

## 2024-01-15 RX ADMIN — Medication 50 MILLIGRAM(S): at 05:03

## 2024-01-15 RX ADMIN — Medication 50 MILLIGRAM(S): at 14:59

## 2024-01-15 RX ADMIN — TIOTROPIUM BROMIDE 2 PUFF(S): 18 CAPSULE ORAL; RESPIRATORY (INHALATION) at 15:05

## 2024-01-15 NOTE — PROGRESS NOTE ADULT - SUBJECTIVE AND OBJECTIVE BOX
SHEKHAR VASQUEZ  MRN#: [id number]  Subjective:   pulmonary progress note  : SOB , Acute COPD  exacerbation , hypoxic in acute respiratory Failure , date of service is 2024   73-year-old          history of COPD on 2L NC at home, hyperthyroid, breast CA, asthma, CHF, chronic lymphedema, wheelchair bound,         presenting with difficulty breathing.     pt t has had cough and difficulty breathing for the past couple of days,  then had significant trouble breathing,      was found by EMS to be hypoxic to 68% on 2 L nasal cannula.     pt    was  placed on CPAP by EMS, which improved oxygen saturation., patient is less lethargic on BiPAP over night acceptable 02 saturation , no GI symptoms .no fever still with coughing , mild wheezing  , E coli  UTI, MRSA sepsis       pt  denies chest pain, syncope, fevers. (2024 09:52)    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      History of carpal tunnel release  right wrist      History of parathyroidectomy      S/P laminectomy  now bed and wheelchair ridden with severe pain          FAMILY HISTORY:  Family history of myocardial infarction  mother  at age 67 and father at age 67 of MI's      OBJECTIVE:  ICU Vital Signs Last 24 Hrs  T(C): 36.9 (2024 11:28), Max: 37.2 (2024 13:58)  T(F): 98.4 (2024 11:28), Max: 99 (2024 13:58)  HR: 83 (2024 11:28) (78 - 93)  BP: 160/78 (2024 11:28) (160/78 - 185/101)  BP(mean): 110 (2024 16:50) (110 - 110)  ABP: --  ABP(mean): --  RR: 20 (2024 11:28) (19 - 20)  SpO2: 93% (2024 11:28) (93% - 99%)    O2 Parameters below as of 2024 11:28  Patient On (Oxygen Delivery Method): nasal cannula w/ humidification             @ 07:01  -   @ 13:53  --------------------------------------------------------  IN: 520 mL / OUT: 1200 mL / NET: -680 mL        PHYSICAL EXAM:Daily   morbidly obese female on 3 liter nasal 02 saturation is 93%  Daily   HEENT:     + NCAT  + EOMI  - Conjuctival edema   - Icterus   - Thrush   - ETT  - NGT/OGT  Neck:         + FROM    + JVD     - Nodes     - Masses    + Mid-line trachea   - Tracheostomy  Chest:       kyphotic deformities   Lungs:          + CTA   + Rhonchi    - Rales    + Wheezing     - Decreased BS   - Dullness R L  Cardiac:       + S1 + S2    + RRR   - Irregular   - S3  - S4    - Murmurs   - Rub   - Hamman’s sign   Abdomen:    + BS     + Soft    + Non-tender     - Distended    - Organomegaly  - PEG  Extremities:   - Cyanosis U/L   - Clubbing  U/L  + LE/UE Edema   + Capillary refill    + Pulses   Neuro:        + Awake   +  Alert   - Confused   - Lethargic   - Sedated   + Generalized Weakness  Skin:        - Rashes    - Erythema   + Normal incisions   + IV sites intact  - Sacral decubit      HOSPITAL MEDICATIONS: All mediciations reviewed and analyzed  MEDICATIONS  (STANDING):  albuterol    90 MICROgram(s) HFA Inhaler 2 Puff(s) Inhalation every 6 hours  albuterol/ipratropium for Nebulization 3 milliLiter(s) Nebulizer every 6 hours  aspirin enteric coated 81 milliGRAM(s) Oral daily  atorvastatin 40 milliGRAM(s) Oral at bedtime  busPIRone 20 milliGRAM(s) Oral <User Schedule>  cefTRIAXone   IVPB      DULoxetine 60 milliGRAM(s) Oral daily  furosemide   Injectable 40 milliGRAM(s) IV Push daily  gabapentin 100 milliGRAM(s) Oral two times a day  guaiFENesin  milliGRAM(s) Oral every 12 hours  heparin   Injectable 5000 Unit(s) SubCutaneous every 12 hours  hydrALAZINE 50 milliGRAM(s) Oral three times a day  influenza  Vaccine (HIGH DOSE) 0.7 milliLiter(s) IntraMuscular once  levothyroxine 125 MICROGram(s) Oral daily  losartan 100 milliGRAM(s) Oral daily  methylPREDNISolone sodium succinate Injectable 20 milliGRAM(s) IV Push three times a day  montelukast 10 milliGRAM(s) Oral daily  OLANZapine 10 milliGRAM(s) Oral at bedtime  propranolol 20 milliGRAM(s) Oral two times a day  tiotropium 2.5 MICROgram(s) Inhaler 2 Puff(s) Inhalation daily    MEDICATIONS  (PRN):    LABS: All Lab data reviewed and analyzed                          12.9   9.18  )-----------( 188      ( 15 Alan 2024 06:06 )             40.1    -14    141  |  103  |  11  ----------------------------<  161<H>  4.4   |  28  |  0.54    Ca    9.9      2024 04:17      Ca    9.9      2024 04:17    TPro  6.9  /  Alb  3.7  /  TBili  1.0  /  DBili  x   /  AST  12  /  ALT  13  /  AlkPhos  104  01-13    PT/INR - ( 2024 01:21 )   PT: 12.2 sec;   INR: 1.17 ratio         PTT - ( 2024 01:21 )  PTT:74.2 sec LIVER FUNCTIONS - ( 2024 01:21 )  Alb: 3.7 g/dL / Pro: 6.9 g/dL / ALK PHOS: 104 U/L / ALT: 13 U/L / AST: 12 U/L / GGT: x           RADIOLOGY: - Reviewed and analyzed  SHEKHAR VASQUEZ  MRN#: [id number]  Subjective:   pulmonary progress note  : SOB , Acute COPD  exacerbation , hypoxic in acute respiratory Failure , date of service is 2024   73-year-old          history of COPD on 2L NC at home, hyperthyroid, breast CA, asthma, CHF, chronic lymphedema, wheelchair bound,         presenting with difficulty breathing.     pt t has had cough and difficulty breathing for the past couple of days,  then had significant trouble breathing,      was found by EMS to be hypoxic to 68% on 2 L nasal cannula.     pt    was  placed on CPAP by EMS, which improved oxygen saturation., patient is less lethargic on BiPAP over night acceptable 02 saturation , no GI symptoms .no fever still with coughing , mild wheezing  , E coli  UTI, MRSA sepsis       pt  denies chest pain, syncope, fevers. (2024 09:52)    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      History of carpal tunnel release  right wrist      History of parathyroidectomy      S/P laminectomy  now bed and wheelchair ridden with severe pain          FAMILY HISTORY:  Family history of myocardial infarction  mother  at age 67 and father at age 67 of MI's      OBJECTIVE:  ICU Vital Signs Last 24 Hrs  T(C): 36.9 (2024 11:28), Max: 37.2 (2024 13:58)  T(F): 98.4 (2024 11:28), Max: 99 (2024 13:58)  HR: 83 (2024 11:28) (78 - 93)  BP: 160/78 (2024 11:28) (160/78 - 185/101)  BP(mean): 110 (2024 16:50) (110 - 110)  ABP: --  ABP(mean): --  RR: 20 (2024 11:28) (19 - 20)  SpO2: 93% (2024 11:28) (93% - 99%)    O2 Parameters below as of 2024 11:28  Patient On (Oxygen Delivery Method): nasal cannula w/ humidification             @ 07:01  -   @ 13:53  --------------------------------------------------------  IN: 520 mL / OUT: 1200 mL / NET: -680 mL        PHYSICAL EXAM:Daily   morbidly obese female on 3 liter nasal 02 saturation is 93%  Daily   HEENT:     + NCAT  + EOMI  - Conjuctival edema   - Icterus   - Thrush   - ETT  - NGT/OGT  Neck:         + FROM    + JVD     - Nodes     - Masses    + Mid-line trachea   - Tracheostomy  Chest:       kyphotic deformities   Lungs:          + CTA   + Rhonchi    - Rales    + Wheezing     - Decreased BS   - Dullness R L  Cardiac:       + S1 + S2    + RRR   - Irregular   - S3  - S4    - Murmurs   - Rub   - Hamman’s sign   Abdomen:    + BS     + Soft    + Non-tender     - Distended    - Organomegaly  - PEG  Extremities:   - Cyanosis U/L   - Clubbing  U/L  + LE/UE Edema   + Capillary refill    + Pulses   Neuro:        + Awake   +  Alert   - Confused   - Lethargic   - Sedated   + Generalized Weakness  Skin:        - Rashes    - Erythema   + Normal incisions   + IV sites intact  - Sacral decubit      HOSPITAL MEDICATIONS: All mediciations reviewed and analyzed  MEDICATIONS  (STANDING):  albuterol    90 MICROgram(s) HFA Inhaler 2 Puff(s) Inhalation every 6 hours  albuterol/ipratropium for Nebulization 3 milliLiter(s) Nebulizer every 6 hours  aspirin enteric coated 81 milliGRAM(s) Oral daily  atorvastatin 40 milliGRAM(s) Oral at bedtime  busPIRone 20 milliGRAM(s) Oral <User Schedule>  cefTRIAXone   IVPB      DULoxetine 60 milliGRAM(s) Oral daily  furosemide   Injectable 40 milliGRAM(s) IV Push daily  gabapentin 100 milliGRAM(s) Oral two times a day  guaiFENesin  milliGRAM(s) Oral every 12 hours  heparin   Injectable 5000 Unit(s) SubCutaneous every 12 hours  hydrALAZINE 50 milliGRAM(s) Oral three times a day  influenza  Vaccine (HIGH DOSE) 0.7 milliLiter(s) IntraMuscular once  levothyroxine 125 MICROGram(s) Oral daily  losartan 100 milliGRAM(s) Oral daily  methylPREDNISolone sodium succinate Injectable 20 milliGRAM(s) IV Push three times a day  montelukast 10 milliGRAM(s) Oral daily  OLANZapine 10 milliGRAM(s) Oral at bedtime  propranolol 20 milliGRAM(s) Oral two times a day  tiotropium 2.5 MICROgram(s) Inhaler 2 Puff(s) Inhalation daily    MEDICATIONS  (PRN):    LABS: All Lab data reviewed and analyzed                          12.9   9.18  )-----------( 188      ( 15 Alan 2024 06:06 )             40.1    -14    141  |  103  |  11  ----------------------------<  161<H>  4.4   |  28  |  0.54    Ca    9.9      2024 04:17      Ca    9.9      2024 04:17    TPro  6.9  /  Alb  3.7  /  TBili  1.0  /  DBili  x   /  AST  12  /  ALT  13  /  AlkPhos  104  01-13    PT/INR - ( 2024 01:21 )   PT: 12.2 sec;   INR: 1.17 ratio         PTT - ( 2024 01:21 )  PTT:74.2 sec LIVER FUNCTIONS - ( 2024 01:21 )  Alb: 3.7 g/dL / Pro: 6.9 g/dL / ALK PHOS: 104 U/L / ALT: 13 U/L / AST: 12 U/L / GGT: x           RADIOLOGY: - Reviewed and analyzed  SHEKHAR VASQUEZ  MRN#: [id number]  Subjective:   pulmonary progress note  : SOB , Acute COPD  exacerbation , hypoxic in acute respiratory Failure , date of service is 2024   73-year-old          history of COPD on 2L NC at home, hyperthyroid, breast CA, asthma, CHF, chronic lymphedema, wheelchair bound,         presenting with difficulty breathing.     pt t has had cough and difficulty breathing for the past couple of days,  then had significant trouble breathing,      was found by EMS to be hypoxic to 68% on 2 L nasal cannula.     pt    was  placed on CPAP by EMS, which improved oxygen saturation., patient is less lethargic on BiPAP over night acceptable 02 saturation , no GI symptoms .no fever still with coughing , mild wheezing  , E coli  UTI, MRSA sepsis       pt  denies chest pain, syncope, fevers. (2024 09:52)    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      History of carpal tunnel release  right wrist      History of parathyroidectomy      S/P laminectomy  now bed and wheelchair ridden with severe pain          FAMILY HISTORY:  Family history of myocardial infarction  mother  at age 67 and father at age 67 of MI's      OBJECTIVE:  ICU Vital Signs Last 24 Hrs  T(C): 36.9 (2024 11:28), Max: 37.2 (2024 13:58)  T(F): 98.4 (2024 11:28), Max: 99 (2024 13:58)  HR: 83 (2024 11:28) (78 - 93)  BP: 160/78 (2024 11:28) (160/78 - 185/101)  BP(mean): 110 (2024 16:50) (110 - 110)  ABP: --  ABP(mean): --  RR: 20 (2024 11:28) (19 - 20)  SpO2: 93% (2024 11:28) (93% - 99%)    O2 Parameters below as of 2024 11:28  Patient On (Oxygen Delivery Method): nasal cannula w/ humidification             @ 07:01  -   @ 13:53  --------------------------------------------------------  IN: 520 mL / OUT: 1200 mL / NET: -680 mL        PHYSICAL EXAM:Daily   morbidly obese female on 3 liter nasal 02 saturation is 93%  Daily   HEENT:     + NCAT  + EOMI  - Conjuctival edema   - Icterus   - Thrush   - ETT  - NGT/OGT  Neck:         + FROM    + JVD     - Nodes     - Masses    + Mid-line trachea   - Tracheostomy  Chest:       kyphotic deformities   Lungs:          + CTA   + Rhonchi    - Rales    + Wheezing     - Decreased BS   - Dullness R L  Cardiac:       + S1 + S2    + RRR   - Irregular   - S3  - S4    - Murmurs   - Rub   - Hamman’s sign   Abdomen:    + BS     + Soft    + Non-tender     - Distended    - Organomegaly  - PEG  Extremities:   - Cyanosis U/L   - Clubbing  U/L  + LE/UE Edema   + Capillary refill    + Pulses   Neuro:        + Awake   +  Alert   - Confused   - Lethargic   - Sedated   + Generalized Weakness  Skin:        - Rashes    - Erythema   + Normal incisions   + IV sites intact  - Sacral decubit      HOSPITAL MEDICATIONS: All mediciations reviewed and analyzed  MEDICATIONS  (STANDING):  albuterol    90 MICROgram(s) HFA Inhaler 2 Puff(s) Inhalation every 6 hours  albuterol/ipratropium for Nebulization 3 milliLiter(s) Nebulizer every 6 hours  aspirin enteric coated 81 milliGRAM(s) Oral daily  atorvastatin 40 milliGRAM(s) Oral at bedtime  busPIRone 20 milliGRAM(s) Oral <User Schedule>  cefTRIAXone   IVPB      DULoxetine 60 milliGRAM(s) Oral daily  furosemide   Injectable 40 milliGRAM(s) IV Push daily  gabapentin 100 milliGRAM(s) Oral two times a day  guaiFENesin  milliGRAM(s) Oral every 12 hours  heparin   Injectable 5000 Unit(s) SubCutaneous every 12 hours  hydrALAZINE 50 milliGRAM(s) Oral three times a day  influenza  Vaccine (HIGH DOSE) 0.7 milliLiter(s) IntraMuscular once  levothyroxine 125 MICROGram(s) Oral daily  losartan 100 milliGRAM(s) Oral daily  methylPREDNISolone sodium succinate Injectable 20 milliGRAM(s) IV Push three times a day  montelukast 10 milliGRAM(s) Oral daily  OLANZapine 10 milliGRAM(s) Oral at bedtime  propranolol 20 milliGRAM(s) Oral two times a day  tiotropium 2.5 MICROgram(s) Inhaler 2 Puff(s) Inhalation daily    MEDICATIONS  (PRN):    LABS: All Lab data reviewed and analyzed                          12.9   9.18  )-----------( 188      ( 15 Alan 2024 06:06 )             40.1    -14    141  |  103  |  11  ----------------------------<  161<H>  4.4   |  28  |  0.54    Ca    9.9      2024 04:17      Ca    9.9      2024 04:17    TPro  6.9  /  Alb  3.7  /  TBili  1.0  /  DBili  x   /  AST  12  /  ALT  13  /  AlkPhos  104  01-13    PT/INR - ( 2024 01:21 )   PT: 12.2 sec;   INR: 1.17 ratio         PTT - ( 2024 01:21 )  PTT:74.2 sec LIVER FUNCTIONS - ( 2024 01:21 )  Alb: 3.7 g/dL / Pro: 6.9 g/dL / ALK PHOS: 104 U/L / ALT: 13 U/L / AST: 12 U/L / GGT: x           RADIOLOGY: - Reviewed and analyzed

## 2024-01-15 NOTE — PROGRESS NOTE ADULT - SUBJECTIVE AND OBJECTIVE BOX
Cardiovascular Disease Progress Note    Overnight events: No acute events overnight.  no new cardiac sx  Otherwise review of systems negative    Objective Findings:  T(C): 36.4 (01-15-24 @ 04:55), Max: 37.1 (01-14-24 @ 20:43)  HR: 75 (01-15-24 @ 04:55) (68 - 83)  BP: 166/86 (01-15-24 @ 04:55) (148/69 - 166/86)  RR: 18 (01-15-24 @ 04:55) (18 - 20)  SpO2: 93% (01-15-24 @ 04:55) (93% - 98%)  Wt(kg): --  Daily     Daily       Physical Exam:  Gen: NAD  HEENT: EOMI  CV: RRR, normal S1 + S2, no m/r/g  Lungs: CTAB  Abd: soft, non-tender  Ext: No edema    Telemetry:    Laboratory Data:                        12.9   9.18  )-----------( 188      ( 15 Alan 2024 06:06 )             40.1     01-14    141  |  103  |  11  ----------------------------<  161<H>  4.4   |  28  |  0.54    Ca    9.9      14 Jan 2024 04:17                Inpatient Medications:  MEDICATIONS  (STANDING):  albuterol    90 MICROgram(s) HFA Inhaler 2 Puff(s) Inhalation every 6 hours  albuterol/ipratropium for Nebulization 3 milliLiter(s) Nebulizer every 6 hours  aspirin enteric coated 81 milliGRAM(s) Oral daily  atorvastatin 40 milliGRAM(s) Oral at bedtime  busPIRone 20 milliGRAM(s) Oral <User Schedule>  cefTRIAXone   IVPB      cefTRIAXone   IVPB 2000 milliGRAM(s) IV Intermittent every 24 hours  DULoxetine 60 milliGRAM(s) Oral daily  furosemide   Injectable 40 milliGRAM(s) IV Push daily  gabapentin 100 milliGRAM(s) Oral two times a day  guaiFENesin  milliGRAM(s) Oral every 12 hours  heparin   Injectable 5000 Unit(s) SubCutaneous every 12 hours  hydrALAZINE 50 milliGRAM(s) Oral three times a day  influenza  Vaccine (HIGH DOSE) 0.7 milliLiter(s) IntraMuscular once  levothyroxine 125 MICROGram(s) Oral daily  losartan 100 milliGRAM(s) Oral daily  methylPREDNISolone sodium succinate Injectable 20 milliGRAM(s) IV Push three times a day  montelukast 10 milliGRAM(s) Oral daily  OLANZapine 10 milliGRAM(s) Oral at bedtime  propranolol 20 milliGRAM(s) Oral two times a day  tiotropium 2.5 MICROgram(s) Inhaler 2 Puff(s) Inhalation daily      Assessment:  73-year-old history of COPD on 2L NC at home, hyperthyroid, breast CA, asthma, CHF, chronic lymphedema, wheelchair bound, presenting with difficulty breathing.  Patient has had cough and difficulty breathing for the past couple of days, today had significant trouble breathing, was found by EMS to be hypoxic to 68% on 2 L nasal cannula.  Patient placed on CPAP by EMS, which improved oxygen saturation.  Patient denies chest pain, syncope, fevers.    Recs:  cardiac stable  dyspnea likely in setting of hmpv and bacterial pneumonia  gentle diuresis with lasix 40mg iv qd  pulmonary consulted, recs appreciated; steroids and bronchodilators per pulmonary  infectious workup and abx per ID  obtain echo (pos blood cultures)  supplemental o2, wean as able  dvt ppx        Over 25 minutes spent on total encounter; more than 50% of the visit was spent counseling and/or coordinating care by the attending physician.      Juan Salcedo MD   Cardiovascular Disease  [phone] Cardiovascular Disease Progress Note    Overnight events: No acute events overnight.  no new cardiac sx  Otherwise review of systems negative    Objective Findings:  T(C): 36.4 (01-15-24 @ 04:55), Max: 37.1 (01-14-24 @ 20:43)  HR: 75 (01-15-24 @ 04:55) (68 - 83)  BP: 166/86 (01-15-24 @ 04:55) (148/69 - 166/86)  RR: 18 (01-15-24 @ 04:55) (18 - 20)  SpO2: 93% (01-15-24 @ 04:55) (93% - 98%)  Wt(kg): --  Daily     Daily       Physical Exam:  Gen: NAD  HEENT: EOMI  CV: RRR, normal S1 + S2, no m/r/g  Lungs: CTAB  Abd: soft, non-tender  Ext: No edema    Telemetry:    Laboratory Data:                        12.9   9.18  )-----------( 188      ( 15 Alan 2024 06:06 )             40.1     01-14    141  |  103  |  11  ----------------------------<  161<H>  4.4   |  28  |  0.54    Ca    9.9      14 Jan 2024 04:17                Inpatient Medications:  MEDICATIONS  (STANDING):  albuterol    90 MICROgram(s) HFA Inhaler 2 Puff(s) Inhalation every 6 hours  albuterol/ipratropium for Nebulization 3 milliLiter(s) Nebulizer every 6 hours  aspirin enteric coated 81 milliGRAM(s) Oral daily  atorvastatin 40 milliGRAM(s) Oral at bedtime  busPIRone 20 milliGRAM(s) Oral <User Schedule>  cefTRIAXone   IVPB      cefTRIAXone   IVPB 2000 milliGRAM(s) IV Intermittent every 24 hours  DULoxetine 60 milliGRAM(s) Oral daily  furosemide   Injectable 40 milliGRAM(s) IV Push daily  gabapentin 100 milliGRAM(s) Oral two times a day  guaiFENesin  milliGRAM(s) Oral every 12 hours  heparin   Injectable 5000 Unit(s) SubCutaneous every 12 hours  hydrALAZINE 50 milliGRAM(s) Oral three times a day  influenza  Vaccine (HIGH DOSE) 0.7 milliLiter(s) IntraMuscular once  levothyroxine 125 MICROGram(s) Oral daily  losartan 100 milliGRAM(s) Oral daily  methylPREDNISolone sodium succinate Injectable 20 milliGRAM(s) IV Push three times a day  montelukast 10 milliGRAM(s) Oral daily  OLANZapine 10 milliGRAM(s) Oral at bedtime  propranolol 20 milliGRAM(s) Oral two times a day  tiotropium 2.5 MICROgram(s) Inhaler 2 Puff(s) Inhalation daily      Assessment:  73-year-old history of COPD on 2L NC at home, hyperthyroid, breast CA, asthma, CHF, chronic lymphedema, wheelchair bound, presenting with difficulty breathing.  Patient has had cough and difficulty breathing for the past couple of days, today had significant trouble breathing, was found by EMS to be hypoxic to 68% on 2 L nasal cannula.  Patient placed on CPAP by EMS, which improved oxygen saturation.  Patient denies chest pain, syncope, fevers.    Recs:  cardiac stable  dyspnea likely in setting of hmpv and bacterial pneumonia  gentle diuresis with lasix 40mg iv qd  pulmonary consulted, recs appreciated; steroids and bronchodilators per pulmonary  infectious workup and abx per ID  obtain echo (pos blood cultures)  supplemental o2, wean as able  dvt ppx        Over 25 minutes spent on total encounter; more than 50% of the visit was spent counseling and/or coordinating care by the attending physician.      Juan Salcedo MD   Cardiovascular Disease  [phone] Cardiovascular Disease Progress Note    Overnight events: No acute events overnight.  no new cardiac sx  Otherwise review of systems negative    Objective Findings:  T(C): 36.4 (01-15-24 @ 04:55), Max: 37.1 (01-14-24 @ 20:43)  HR: 75 (01-15-24 @ 04:55) (68 - 83)  BP: 166/86 (01-15-24 @ 04:55) (148/69 - 166/86)  RR: 18 (01-15-24 @ 04:55) (18 - 20)  SpO2: 93% (01-15-24 @ 04:55) (93% - 98%)  Wt(kg): --  Daily     Daily       Physical Exam:  Gen: NAD  HEENT: EOMI  CV: RRR, normal S1 + S2, no m/r/g  Lungs: CTAB  Abd: soft, non-tender  Ext: No edema    Telemetry:    Laboratory Data:                        12.9   9.18  )-----------( 188      ( 15 Alan 2024 06:06 )             40.1     01-14    141  |  103  |  11  ----------------------------<  161<H>  4.4   |  28  |  0.54    Ca    9.9      14 Jan 2024 04:17                Inpatient Medications:  MEDICATIONS  (STANDING):  albuterol    90 MICROgram(s) HFA Inhaler 2 Puff(s) Inhalation every 6 hours  albuterol/ipratropium for Nebulization 3 milliLiter(s) Nebulizer every 6 hours  aspirin enteric coated 81 milliGRAM(s) Oral daily  atorvastatin 40 milliGRAM(s) Oral at bedtime  busPIRone 20 milliGRAM(s) Oral <User Schedule>  cefTRIAXone   IVPB      cefTRIAXone   IVPB 2000 milliGRAM(s) IV Intermittent every 24 hours  DULoxetine 60 milliGRAM(s) Oral daily  furosemide   Injectable 40 milliGRAM(s) IV Push daily  gabapentin 100 milliGRAM(s) Oral two times a day  guaiFENesin  milliGRAM(s) Oral every 12 hours  heparin   Injectable 5000 Unit(s) SubCutaneous every 12 hours  hydrALAZINE 50 milliGRAM(s) Oral three times a day  influenza  Vaccine (HIGH DOSE) 0.7 milliLiter(s) IntraMuscular once  levothyroxine 125 MICROGram(s) Oral daily  losartan 100 milliGRAM(s) Oral daily  methylPREDNISolone sodium succinate Injectable 20 milliGRAM(s) IV Push three times a day  montelukast 10 milliGRAM(s) Oral daily  OLANZapine 10 milliGRAM(s) Oral at bedtime  propranolol 20 milliGRAM(s) Oral two times a day  tiotropium 2.5 MICROgram(s) Inhaler 2 Puff(s) Inhalation daily      Assessment:  73-year-old history of COPD on 2L NC at home, hyperthyroid, breast CA, asthma, CHF, chronic lymphedema, wheelchair bound, presenting with difficulty breathing.  Patient has had cough and difficulty breathing for the past couple of days, today had significant trouble breathing, was found by EMS to be hypoxic to 68% on 2 L nasal cannula.  Patient placed on CPAP by EMS, which improved oxygen saturation.  Patient denies chest pain, syncope, fevers.    Recs:  cardiac stable  dyspnea likely in setting of hmpv and bacterial pneumonia  gentle diuresis with lasix 40mg iv qd  pulmonary consulted, recs appreciated; steroids and bronchodilators per pulmonary  infectious workup and abx per ID  obtain echo (pos blood cultures)  supplemental o2, wean as able  dvt ppx        Over 25 minutes spent on total encounter; more than 50% of the visit was spent counseling and/or coordinating care by the attending physician.      Juan Salcedo MD   Cardiovascular Disease  [phone]

## 2024-01-15 NOTE — PROGRESS NOTE ADULT - ASSESSMENT
_________________________________________________________________________________________  ========>>  M E D I C A L   A T T E N D I N G    F O L L O W  U P  N O T E  <<=========  -----------------------------------------------------------------------------------------------------    - Patient seen and examined by me earlier today.   - In summary,  SHEKHAR VASQUEZ is a 73y year old woman admitted with SOB  - Patient today overall doing ok, comfortable, eating OK. + occasional cough       patient with complains of abnormality of right thumb >> + fungal infection of right thumb tip and nail with skin cracking.. >> ordered antifungal cream..     ==================>> REVIEW OF SYSTEM <<=================    GEN: no fever, no chills, overall doing ok   RESP: no SOB at rest , no cough, no sputum  CVS: no chest pain, no palpitations  GI: no abdominal pain, no nausea, no BM X 2 days : monitoring   : no dysuria, no frequency  Neuro: no headache, no dizziness    ==================>> PHYSICAL EXAM <<=================    GEN: A&O X 3 , NAD , comfortable, pleasant, calm   HEENT: NCAT, PERRL, MMM, hearing intact  CVS: S1S2 , regular , No M/R/G appreciated  PULM: CTA B/L,  limited exam due to body habitus.   ABD.: soft. non tender, non distended,  obese   Extrem: intact pulses , ++ chronic lymphedema changes >> to ACE wrap        ( Note written / Date of service 01-15-24 ( This is certified to be the same as "ENTERED" date above ( for billing purposes)))    ==================>> MEDICATIONS <<====================    albuterol    90 MICROgram(s) HFA Inhaler 2 Puff(s) Inhalation every 6 hours  albuterol/ipratropium for Nebulization 3 milliLiter(s) Nebulizer every 6 hours  aspirin enteric coated 81 milliGRAM(s) Oral daily  atorvastatin 40 milliGRAM(s) Oral at bedtime  busPIRone 20 milliGRAM(s) Oral <User Schedule>  cefTRIAXone   IVPB 2000 milliGRAM(s) IV Intermittent every 24 hours  cefTRIAXone   IVPB      clotrimazole 1% Cream 1 Application(s) Topical every 12 hours  DULoxetine 60 milliGRAM(s) Oral daily  furosemide   Injectable 40 milliGRAM(s) IV Push daily  gabapentin 100 milliGRAM(s) Oral two times a day  guaiFENesin  milliGRAM(s) Oral every 12 hours  heparin   Injectable 5000 Unit(s) SubCutaneous every 12 hours  hydrALAZINE 50 milliGRAM(s) Oral three times a day  influenza  Vaccine (HIGH DOSE) 0.7 milliLiter(s) IntraMuscular once  levothyroxine 125 MICROGram(s) Oral daily  losartan 100 milliGRAM(s) Oral daily  methylPREDNISolone sodium succinate Injectable 20 milliGRAM(s) IV Push three times a day  montelukast 10 milliGRAM(s) Oral daily  OLANZapine 10 milliGRAM(s) Oral at bedtime  propranolol 20 milliGRAM(s) Oral two times a day  tiotropium 2.5 MICROgram(s) Inhaler 2 Puff(s) Inhalation daily    MEDICATIONS  (PRN):  acetaminophen     Tablet .. 650 milliGRAM(s) Oral every 6 hours PRN Temp greater or equal to 38C (100.4F), Mild Pain (1 - 3)    ___________  Active diet:  Diet, Regular  ___________________    ==================>> VITAL SIGNS <<==================    Weight (kg): 112.7  Vital Signs Last 24 HrsT(C): 36.5 (01-15-24 @ 11:52)  T(F): 97.7 (01-15-24 @ 11:52), Max: 98.7 (01-14-24 @ 20:43)  HR: 77 (01-15-24 @ 11:52) (68 - 77)  BP: 173/78 (01-15-24 @ 11:52)  RR: 18 (01-15-24 @ 11:52) (18 - 20)  SpO2: 94% (01-15-24 @ 11:52) (93% - 98%)       ==================>> LAB AND IMAGING <<==================                        12.9   9.18  )-----------( 188      ( 15 Alan 2024 06:06 )             40.1        01-14    141  |  103  |  11  ----------------------------<  161<H>  4.4   |  28  |  0.54    Ca    9.9      14 Jan 2024 04:17    WBC count:   9.18 <<== ,  9.76 <<==   Hemoglobin:   12.9 <<==,  12.0 <<==  platelets:  188 <==, 153 <==    Creatinine:  0.54  <<==, 0.67  <<==  Sodium:   141  <==, 137  <==    ____________________________    M I C R O B I O L O G Y :    Culture - Blood (collected 14 Jan 2024 04:46)  Source: .Blood Blood-Peripheral  Preliminary Report (15 Alan 2024 09:02):    No growth at 24 hours    Culture - Blood (collected 14 Jan 2024 04:46)  Source: .Blood Blood-Peripheral  Preliminary Report (15 Alan 2024 09:02):    No growth at 24 hours    Culture - Urine (collected 13 Jan 2024 02:08)  Source: Clean Catch Clean Catch (Midstream)  Preliminary Report (14 Jan 2024 23:36):    >100,000 CFU/ml Escherichia coli    Culture - Blood (collected 13 Jan 2024 00:45)  Source: .Blood Blood-Peripheral  Gram Stain (13 Jan 2024 23:35):    Growth in aerobic bottle: Gram Positive Cocci in Clusters  Final Report (14 Jan 2024 21:03):    Growth in aerobic bottle: Staphylococcus epidermidis    Isolation of Coagulase negative Staphylococcus from single blood culture    sets may represent    contamination. Contact the Microbiology Department at [phone] if    susceptibility testing is    clinically indicated.    Culture - Blood (collected 13 Jan 2024 00:30)  Source: .Blood Blood-Peripheral  Gram Stain (13 Jan 2024 20:50):    Growth in aerobic bottle: Gram Positive Cocci in Clusters  Preliminary Report (13 Jan 2024 20:50):    Growth in aerobic bottle: Gram Positive Cocci in Clusters    Direct identification is available within approximately 3-5    hours either by Blood Panel Multiplexed PCR or Direct    MALDI-TOF. Details: [email or web address]  Organism: Blood Culture PCR (13 Jan 2024 22:08)  Organism: Blood Culture PCR (13 Jan 2024 22:08)    Sensitivities:      Method Type: PCR      -  Staphylococcus epidermidis, Methicillin resistant: Detec    SARS-CoV-2: NotDetec (01-13-24 @ 01:48)    < from: CT Chest No Cont (01.13.24 @ 14:55) >  IMPRESSION:  Left lower lobe consolidation likely representing pneumonia. Tree-in-bud   opacities within posterior left upper lobe. Recommend follow-up chest CT   in 1-3 months to determine resolution.  Emphysema..  < end of copied text >    ___________________________________________________________________________________  ===============>>  A S S E S S M E N T   A N D   P L A N <<===============  ------------------------------------------------------------------------------------------    · Assessment	   73-year-old woman with h/o  COPD on 2L NC at home, hypothyroid,  breast CA, asthma, chronic lymphedema, wheelchair bound, obesity       presented with difficulty breathing./   had cough and difficulty breathing for the past couple of days,        then had significant trouble breathing, . had  worsened     was found by EMS to be hypoxic to 68% on 2 L nasal cannula.  /  was  placed on CPAP by EMS, which improved oxygen sat/ /  denies chest pain, syncope, fevers.      admitted wth sob, + HMPV    acute  copd  exacerbation.  on home oxygen        on  iv sterod. /  Proventil   spiriva         CT showing pneumonia         MRSA staph epi Bacteremia > ? contamination          UTI  Pulm and ID following, appreciated  on antibiotics   follow cultures/ sensitivities       h/o   acute on  c/c diastolic  chf    on iv lasix, per  card       HTN.  HLD    Continue Current medications otherwise and monitor.    adjust medications as needed per cardio     h/o esophageal   dysmotility   c/c  lymphedema       continue current management    PT / OOB as able ( patient at baseline not very active)     hope for DC planing home in next couple of days... !    --------------------------------------------  Case discussed with patient,   Education given on findings and plan of care  ___________________________  H. JANICE Welch.  Pager: [phone]       _________________________________________________________________________________________  ========>>  M E D I C A L   A T T E N D I N G    F O L L O W  U P  N O T E  <<=========  -----------------------------------------------------------------------------------------------------    - Patient seen and examined by me earlier today.   - In summary,  SHEKHAR VASQUEZ is a 73y year old woman admitted with SOB  - Patient today overall doing ok, comfortable, eating OK. + occasional cough       patient with complains of abnormality of right thumb >> + fungal infection of right thumb tip and nail with skin cracking.. >> ordered antifungal cream..     ==================>> REVIEW OF SYSTEM <<=================    GEN: no fever, no chills, overall doing ok   RESP: no SOB at rest , no cough, no sputum  CVS: no chest pain, no palpitations  GI: no abdominal pain, no nausea, no BM X 2 days : monitoring   : no dysuria, no frequency  Neuro: no headache, no dizziness    ==================>> PHYSICAL EXAM <<=================    GEN: A&O X 3 , NAD , comfortable, pleasant, calm   HEENT: NCAT, PERRL, MMM, hearing intact  CVS: S1S2 , regular , No M/R/G appreciated  PULM: CTA B/L,  limited exam due to body habitus.   ABD.: soft. non tender, non distended,  obese   Extrem: intact pulses , ++ chronic lymphedema changes >> to ACE wrap        ( Note written / Date of service 01-15-24 ( This is certified to be the same as "ENTERED" date above ( for billing purposes)))    ==================>> MEDICATIONS <<====================    albuterol    90 MICROgram(s) HFA Inhaler 2 Puff(s) Inhalation every 6 hours  albuterol/ipratropium for Nebulization 3 milliLiter(s) Nebulizer every 6 hours  aspirin enteric coated 81 milliGRAM(s) Oral daily  atorvastatin 40 milliGRAM(s) Oral at bedtime  busPIRone 20 milliGRAM(s) Oral <User Schedule>  cefTRIAXone   IVPB 2000 milliGRAM(s) IV Intermittent every 24 hours  cefTRIAXone   IVPB      clotrimazole 1% Cream 1 Application(s) Topical every 12 hours  DULoxetine 60 milliGRAM(s) Oral daily  furosemide   Injectable 40 milliGRAM(s) IV Push daily  gabapentin 100 milliGRAM(s) Oral two times a day  guaiFENesin  milliGRAM(s) Oral every 12 hours  heparin   Injectable 5000 Unit(s) SubCutaneous every 12 hours  hydrALAZINE 50 milliGRAM(s) Oral three times a day  influenza  Vaccine (HIGH DOSE) 0.7 milliLiter(s) IntraMuscular once  levothyroxine 125 MICROGram(s) Oral daily  losartan 100 milliGRAM(s) Oral daily  methylPREDNISolone sodium succinate Injectable 20 milliGRAM(s) IV Push three times a day  montelukast 10 milliGRAM(s) Oral daily  OLANZapine 10 milliGRAM(s) Oral at bedtime  propranolol 20 milliGRAM(s) Oral two times a day  tiotropium 2.5 MICROgram(s) Inhaler 2 Puff(s) Inhalation daily    MEDICATIONS  (PRN):  acetaminophen     Tablet .. 650 milliGRAM(s) Oral every 6 hours PRN Temp greater or equal to 38C (100.4F), Mild Pain (1 - 3)    ___________  Active diet:  Diet, Regular  ___________________    ==================>> VITAL SIGNS <<==================    Weight (kg): 112.7  Vital Signs Last 24 HrsT(C): 36.5 (01-15-24 @ 11:52)  T(F): 97.7 (01-15-24 @ 11:52), Max: 98.7 (01-14-24 @ 20:43)  HR: 77 (01-15-24 @ 11:52) (68 - 77)  BP: 173/78 (01-15-24 @ 11:52)  RR: 18 (01-15-24 @ 11:52) (18 - 20)  SpO2: 94% (01-15-24 @ 11:52) (93% - 98%)       ==================>> LAB AND IMAGING <<==================                        12.9   9.18  )-----------( 188      ( 15 Alan 2024 06:06 )             40.1        01-14    141  |  103  |  11  ----------------------------<  161<H>  4.4   |  28  |  0.54    Ca    9.9      14 Jan 2024 04:17    WBC count:   9.18 <<== ,  9.76 <<==   Hemoglobin:   12.9 <<==,  12.0 <<==  platelets:  188 <==, 153 <==    Creatinine:  0.54  <<==, 0.67  <<==  Sodium:   141  <==, 137  <==    ____________________________    M I C R O B I O L O G Y :    Culture - Blood (collected 14 Jan 2024 04:46)  Source: .Blood Blood-Peripheral  Preliminary Report (15 Alan 2024 09:02):    No growth at 24 hours    Culture - Blood (collected 14 Jan 2024 04:46)  Source: .Blood Blood-Peripheral  Preliminary Report (15 Alan 2024 09:02):    No growth at 24 hours    Culture - Urine (collected 13 Jan 2024 02:08)  Source: Clean Catch Clean Catch (Midstream)  Preliminary Report (14 Jan 2024 23:36):    >100,000 CFU/ml Escherichia coli    Culture - Blood (collected 13 Jan 2024 00:45)  Source: .Blood Blood-Peripheral  Gram Stain (13 Jan 2024 23:35):    Growth in aerobic bottle: Gram Positive Cocci in Clusters  Final Report (14 Jan 2024 21:03):    Growth in aerobic bottle: Staphylococcus epidermidis    Isolation of Coagulase negative Staphylococcus from single blood culture    sets may represent    contamination. Contact the Microbiology Department at [phone] if    susceptibility testing is    clinically indicated.    Culture - Blood (collected 13 Jan 2024 00:30)  Source: .Blood Blood-Peripheral  Gram Stain (13 Jan 2024 20:50):    Growth in aerobic bottle: Gram Positive Cocci in Clusters  Preliminary Report (13 Jan 2024 20:50):    Growth in aerobic bottle: Gram Positive Cocci in Clusters    Direct identification is available within approximately 3-5    hours either by Blood Panel Multiplexed PCR or Direct    MALDI-TOF. Details: [email or web address]  Organism: Blood Culture PCR (13 Jan 2024 22:08)  Organism: Blood Culture PCR (13 Jan 2024 22:08)    Sensitivities:      Method Type: PCR      -  Staphylococcus epidermidis, Methicillin resistant: Detec    SARS-CoV-2: NotDetec (01-13-24 @ 01:48)    < from: CT Chest No Cont (01.13.24 @ 14:55) >  IMPRESSION:  Left lower lobe consolidation likely representing pneumonia. Tree-in-bud   opacities within posterior left upper lobe. Recommend follow-up chest CT   in 1-3 months to determine resolution.  Emphysema..  < end of copied text >    ___________________________________________________________________________________  ===============>>  A S S E S S M E N T   A N D   P L A N <<===============  ------------------------------------------------------------------------------------------    · Assessment	   73-year-old woman with h/o  COPD on 2L NC at home, hypothyroid,  breast CA, asthma, chronic lymphedema, wheelchair bound, obesity       presented with difficulty breathing./   had cough and difficulty breathing for the past couple of days,        then had significant trouble breathing, . had  worsened     was found by EMS to be hypoxic to 68% on 2 L nasal cannula.  /  was  placed on CPAP by EMS, which improved oxygen sat/ /  denies chest pain, syncope, fevers.      admitted wth sob, + HMPV    acute  copd  exacerbation.  on home oxygen        on  iv sterod. /  Proventil   spiriva         CT showing pneumonia         MRSA staph epi Bacteremia > ? contamination          UTI  Pulm and ID following, appreciated  on antibiotics   follow cultures/ sensitivities       h/o   acute on  c/c diastolic  chf    on iv lasix, per  card       HTN.  HLD    Continue Current medications otherwise and monitor.    adjust medications as needed per cardio     h/o esophageal   dysmotility   c/c  lymphedema       continue current management    PT / OOB as able ( patient at baseline not very active)     hope for DC planing home in next couple of days... !    --------------------------------------------  Case discussed with patient,   Education given on findings and plan of care  ___________________________  H. JANICE Welch.  Pager: [phone]       _________________________________________________________________________________________  ========>>  M E D I C A L   A T T E N D I N G    F O L L O W  U P  N O T E  <<=========  -----------------------------------------------------------------------------------------------------    - Patient seen and examined by me earlier today.   - In summary,  SHEKHAR VASQUEZ is a 73y year old woman admitted with SOB  - Patient today overall doing ok, comfortable, eating OK. + occasional cough       patient with complains of abnormality of right thumb >> + fungal infection of right thumb tip and nail with skin cracking.. >> ordered antifungal cream..     ==================>> REVIEW OF SYSTEM <<=================    GEN: no fever, no chills, overall doing ok   RESP: no SOB at rest , no cough, no sputum  CVS: no chest pain, no palpitations  GI: no abdominal pain, no nausea, no BM X 2 days : monitoring   : no dysuria, no frequency  Neuro: no headache, no dizziness    ==================>> PHYSICAL EXAM <<=================    GEN: A&O X 3 , NAD , comfortable, pleasant, calm   HEENT: NCAT, PERRL, MMM, hearing intact  CVS: S1S2 , regular , No M/R/G appreciated  PULM: CTA B/L,  limited exam due to body habitus.   ABD.: soft. non tender, non distended,  obese   Extrem: intact pulses , ++ chronic lymphedema changes >> to ACE wrap        ( Note written / Date of service 01-15-24 ( This is certified to be the same as "ENTERED" date above ( for billing purposes)))    ==================>> MEDICATIONS <<====================    albuterol    90 MICROgram(s) HFA Inhaler 2 Puff(s) Inhalation every 6 hours  albuterol/ipratropium for Nebulization 3 milliLiter(s) Nebulizer every 6 hours  aspirin enteric coated 81 milliGRAM(s) Oral daily  atorvastatin 40 milliGRAM(s) Oral at bedtime  busPIRone 20 milliGRAM(s) Oral <User Schedule>  cefTRIAXone   IVPB 2000 milliGRAM(s) IV Intermittent every 24 hours  cefTRIAXone   IVPB      clotrimazole 1% Cream 1 Application(s) Topical every 12 hours  DULoxetine 60 milliGRAM(s) Oral daily  furosemide   Injectable 40 milliGRAM(s) IV Push daily  gabapentin 100 milliGRAM(s) Oral two times a day  guaiFENesin  milliGRAM(s) Oral every 12 hours  heparin   Injectable 5000 Unit(s) SubCutaneous every 12 hours  hydrALAZINE 50 milliGRAM(s) Oral three times a day  influenza  Vaccine (HIGH DOSE) 0.7 milliLiter(s) IntraMuscular once  levothyroxine 125 MICROGram(s) Oral daily  losartan 100 milliGRAM(s) Oral daily  methylPREDNISolone sodium succinate Injectable 20 milliGRAM(s) IV Push three times a day  montelukast 10 milliGRAM(s) Oral daily  OLANZapine 10 milliGRAM(s) Oral at bedtime  propranolol 20 milliGRAM(s) Oral two times a day  tiotropium 2.5 MICROgram(s) Inhaler 2 Puff(s) Inhalation daily    MEDICATIONS  (PRN):  acetaminophen     Tablet .. 650 milliGRAM(s) Oral every 6 hours PRN Temp greater or equal to 38C (100.4F), Mild Pain (1 - 3)    ___________  Active diet:  Diet, Regular  ___________________    ==================>> VITAL SIGNS <<==================    Weight (kg): 112.7  Vital Signs Last 24 HrsT(C): 36.5 (01-15-24 @ 11:52)  T(F): 97.7 (01-15-24 @ 11:52), Max: 98.7 (01-14-24 @ 20:43)  HR: 77 (01-15-24 @ 11:52) (68 - 77)  BP: 173/78 (01-15-24 @ 11:52)  RR: 18 (01-15-24 @ 11:52) (18 - 20)  SpO2: 94% (01-15-24 @ 11:52) (93% - 98%)       ==================>> LAB AND IMAGING <<==================                        12.9   9.18  )-----------( 188      ( 15 Alan 2024 06:06 )             40.1        01-14    141  |  103  |  11  ----------------------------<  161<H>  4.4   |  28  |  0.54    Ca    9.9      14 Jan 2024 04:17    WBC count:   9.18 <<== ,  9.76 <<==   Hemoglobin:   12.9 <<==,  12.0 <<==  platelets:  188 <==, 153 <==    Creatinine:  0.54  <<==, 0.67  <<==  Sodium:   141  <==, 137  <==    ____________________________    M I C R O B I O L O G Y :    Culture - Blood (collected 14 Jan 2024 04:46)  Source: .Blood Blood-Peripheral  Preliminary Report (15 Alan 2024 09:02):    No growth at 24 hours    Culture - Blood (collected 14 Jan 2024 04:46)  Source: .Blood Blood-Peripheral  Preliminary Report (15 Alan 2024 09:02):    No growth at 24 hours    Culture - Urine (collected 13 Jan 2024 02:08)  Source: Clean Catch Clean Catch (Midstream)  Preliminary Report (14 Jan 2024 23:36):    >100,000 CFU/ml Escherichia coli    Culture - Blood (collected 13 Jan 2024 00:45)  Source: .Blood Blood-Peripheral  Gram Stain (13 Jan 2024 23:35):    Growth in aerobic bottle: Gram Positive Cocci in Clusters  Final Report (14 Jan 2024 21:03):    Growth in aerobic bottle: Staphylococcus epidermidis    Isolation of Coagulase negative Staphylococcus from single blood culture    sets may represent    contamination. Contact the Microbiology Department at [phone] if    susceptibility testing is    clinically indicated.    Culture - Blood (collected 13 Jan 2024 00:30)  Source: .Blood Blood-Peripheral  Gram Stain (13 Jan 2024 20:50):    Growth in aerobic bottle: Gram Positive Cocci in Clusters  Preliminary Report (13 Jan 2024 20:50):    Growth in aerobic bottle: Gram Positive Cocci in Clusters    Direct identification is available within approximately 3-5    hours either by Blood Panel Multiplexed PCR or Direct    MALDI-TOF. Details: [email or web address]  Organism: Blood Culture PCR (13 Jan 2024 22:08)  Organism: Blood Culture PCR (13 Jan 2024 22:08)    Sensitivities:      Method Type: PCR      -  Staphylococcus epidermidis, Methicillin resistant: Detec    SARS-CoV-2: NotDetec (01-13-24 @ 01:48)    < from: CT Chest No Cont (01.13.24 @ 14:55) >  IMPRESSION:  Left lower lobe consolidation likely representing pneumonia. Tree-in-bud   opacities within posterior left upper lobe. Recommend follow-up chest CT   in 1-3 months to determine resolution.  Emphysema..  < end of copied text >    ___________________________________________________________________________________  ===============>>  A S S E S S M E N T   A N D   P L A N <<===============  ------------------------------------------------------------------------------------------    · Assessment	   73-year-old woman with h/o  COPD on 2L NC at home, hypothyroid,  breast CA, asthma, chronic lymphedema, wheelchair bound, obesity       presented with difficulty breathing./   had cough and difficulty breathing for the past couple of days,        then had significant trouble breathing, . had  worsened     was found by EMS to be hypoxic to 68% on 2 L nasal cannula.  /  was  placed on CPAP by EMS, which improved oxygen sat/ /  denies chest pain, syncope, fevers.      admitted wth sob, + HMPV    acute  copd  exacerbation.  on home oxygen        on  iv sterod. /  Proventil   spiriva         CT showing pneumonia         MRSA staph epi Bacteremia > ? contamination          UTI  Pulm and ID following, appreciated  on antibiotics   follow cultures/ sensitivities       h/o   acute on  c/c diastolic  chf    on iv lasix, per  card       HTN.  HLD    Continue Current medications otherwise and monitor.    adjust medications as needed per cardio     h/o esophageal   dysmotility   c/c  lymphedema       continue current management    PT / OOB as able ( patient at baseline not very active)     hope for DC planing home in next couple of days... !    --------------------------------------------  Case discussed with patient,   Education given on findings and plan of care  ___________________________  H. JANICE Welch.  Pager: [phone]

## 2024-01-16 LAB
-  AMOXICILLIN/CLAVULANIC ACID: SIGNIFICANT CHANGE UP
-  AMOXICILLIN/CLAVULANIC ACID: SIGNIFICANT CHANGE UP
-  AMPICILLIN/SULBACTAM: SIGNIFICANT CHANGE UP
-  AMPICILLIN/SULBACTAM: SIGNIFICANT CHANGE UP
-  AMPICILLIN: SIGNIFICANT CHANGE UP
-  AMPICILLIN: SIGNIFICANT CHANGE UP
-  AZTREONAM: SIGNIFICANT CHANGE UP
-  AZTREONAM: SIGNIFICANT CHANGE UP
-  CEFAZOLIN: SIGNIFICANT CHANGE UP
-  CEFAZOLIN: SIGNIFICANT CHANGE UP
-  CEFEPIME: SIGNIFICANT CHANGE UP
-  CEFEPIME: SIGNIFICANT CHANGE UP
-  CEFTRIAXONE: SIGNIFICANT CHANGE UP
-  CEFTRIAXONE: SIGNIFICANT CHANGE UP
-  CEFUROXIME: SIGNIFICANT CHANGE UP
-  CEFUROXIME: SIGNIFICANT CHANGE UP
-  CIPROFLOXACIN: SIGNIFICANT CHANGE UP
-  CIPROFLOXACIN: SIGNIFICANT CHANGE UP
-  ERTAPENEM: SIGNIFICANT CHANGE UP
-  ERTAPENEM: SIGNIFICANT CHANGE UP
-  GENTAMICIN: SIGNIFICANT CHANGE UP
-  GENTAMICIN: SIGNIFICANT CHANGE UP
-  IMIPENEM: SIGNIFICANT CHANGE UP
-  IMIPENEM: SIGNIFICANT CHANGE UP
-  LEVOFLOXACIN: SIGNIFICANT CHANGE UP
-  LEVOFLOXACIN: SIGNIFICANT CHANGE UP
-  MEROPENEM: SIGNIFICANT CHANGE UP
-  MEROPENEM: SIGNIFICANT CHANGE UP
-  NITROFURANTOIN: SIGNIFICANT CHANGE UP
-  NITROFURANTOIN: SIGNIFICANT CHANGE UP
-  PIPERACILLIN/TAZOBACTAM: SIGNIFICANT CHANGE UP
-  PIPERACILLIN/TAZOBACTAM: SIGNIFICANT CHANGE UP
-  TOBRAMYCIN: SIGNIFICANT CHANGE UP
-  TOBRAMYCIN: SIGNIFICANT CHANGE UP
-  TRIMETHOPRIM/SULFAMETHOXAZOLE: SIGNIFICANT CHANGE UP
-  TRIMETHOPRIM/SULFAMETHOXAZOLE: SIGNIFICANT CHANGE UP
ANION GAP SERPL CALC-SCNC: 11 MMOL/L — SIGNIFICANT CHANGE UP (ref 5–17)
BASOPHILS # BLD AUTO: 0.02 K/UL — SIGNIFICANT CHANGE UP (ref 0–0.2)
BASOPHILS NFR BLD AUTO: 0.2 % — SIGNIFICANT CHANGE UP (ref 0–2)
BUN SERPL-MCNC: 20 MG/DL — SIGNIFICANT CHANGE UP (ref 7–23)
CALCIUM SERPL-MCNC: 9.8 MG/DL — SIGNIFICANT CHANGE UP (ref 8.4–10.5)
CHLORIDE SERPL-SCNC: 97 MMOL/L — SIGNIFICANT CHANGE UP (ref 96–108)
CO2 SERPL-SCNC: 32 MMOL/L — HIGH (ref 22–31)
CREAT SERPL-MCNC: 0.64 MG/DL — SIGNIFICANT CHANGE UP (ref 0.5–1.3)
CULTURE RESULTS: ABNORMAL
CULTURE RESULTS: ABNORMAL
EGFR: 92 ML/MIN/1.73M2 — SIGNIFICANT CHANGE UP
EOSINOPHIL # BLD AUTO: 0 K/UL — SIGNIFICANT CHANGE UP (ref 0–0.5)
EOSINOPHIL NFR BLD AUTO: 0 % — SIGNIFICANT CHANGE UP (ref 0–6)
GLUCOSE SERPL-MCNC: 159 MG/DL — HIGH (ref 70–99)
HCT VFR BLD CALC: 40.3 % — SIGNIFICANT CHANGE UP (ref 34.5–45)
HGB BLD-MCNC: 12.7 G/DL — SIGNIFICANT CHANGE UP (ref 11.5–15.5)
IMM GRANULOCYTES NFR BLD AUTO: 1 % — HIGH (ref 0–0.9)
LYMPHOCYTES # BLD AUTO: 1.34 K/UL — SIGNIFICANT CHANGE UP (ref 1–3.3)
LYMPHOCYTES # BLD AUTO: 13.8 % — SIGNIFICANT CHANGE UP (ref 13–44)
MCHC RBC-ENTMCNC: 29.5 PG — SIGNIFICANT CHANGE UP (ref 27–34)
MCHC RBC-ENTMCNC: 31.5 GM/DL — LOW (ref 32–36)
MCV RBC AUTO: 93.5 FL — SIGNIFICANT CHANGE UP (ref 80–100)
METHOD TYPE: SIGNIFICANT CHANGE UP
METHOD TYPE: SIGNIFICANT CHANGE UP
MONOCYTES # BLD AUTO: 0.77 K/UL — SIGNIFICANT CHANGE UP (ref 0–0.9)
MONOCYTES NFR BLD AUTO: 7.9 % — SIGNIFICANT CHANGE UP (ref 2–14)
NEUTROPHILS # BLD AUTO: 7.49 K/UL — HIGH (ref 1.8–7.4)
NEUTROPHILS NFR BLD AUTO: 77.1 % — HIGH (ref 43–77)
NRBC # BLD: 0 /100 WBCS — SIGNIFICANT CHANGE UP (ref 0–0)
ORGANISM # SPEC MICROSCOPIC CNT: ABNORMAL
PLATELET # BLD AUTO: 206 K/UL — SIGNIFICANT CHANGE UP (ref 150–400)
POTASSIUM SERPL-MCNC: 3.9 MMOL/L — SIGNIFICANT CHANGE UP (ref 3.5–5.3)
POTASSIUM SERPL-SCNC: 3.9 MMOL/L — SIGNIFICANT CHANGE UP (ref 3.5–5.3)
RBC # BLD: 4.31 M/UL — SIGNIFICANT CHANGE UP (ref 3.8–5.2)
RBC # FLD: 14.1 % — SIGNIFICANT CHANGE UP (ref 10.3–14.5)
SODIUM SERPL-SCNC: 140 MMOL/L — SIGNIFICANT CHANGE UP (ref 135–145)
SPECIMEN SOURCE: SIGNIFICANT CHANGE UP
SPECIMEN SOURCE: SIGNIFICANT CHANGE UP
WBC # BLD: 9.72 K/UL — SIGNIFICANT CHANGE UP (ref 3.8–10.5)
WBC # FLD AUTO: 9.72 K/UL — SIGNIFICANT CHANGE UP (ref 3.8–10.5)

## 2024-01-16 PROCEDURE — 99231 SBSQ HOSP IP/OBS SF/LOW 25: CPT

## 2024-01-16 PROCEDURE — 93970 EXTREMITY STUDY: CPT | Mod: 26

## 2024-01-16 RX ORDER — FUROSEMIDE 40 MG
40 TABLET ORAL DAILY
Refills: 0 | Status: DISCONTINUED | OUTPATIENT
Start: 2024-01-16 | End: 2024-01-22

## 2024-01-16 RX ADMIN — Medication 20 MILLIGRAM(S): at 13:21

## 2024-01-16 RX ADMIN — LOSARTAN POTASSIUM 100 MILLIGRAM(S): 100 TABLET, FILM COATED ORAL at 05:55

## 2024-01-16 RX ADMIN — Medication 81 MILLIGRAM(S): at 13:19

## 2024-01-16 RX ADMIN — Medication 600 MILLIGRAM(S): at 17:55

## 2024-01-16 RX ADMIN — Medication 600 MILLIGRAM(S): at 05:55

## 2024-01-16 RX ADMIN — TIOTROPIUM BROMIDE 2 PUFF(S): 18 CAPSULE ORAL; RESPIRATORY (INHALATION) at 12:00

## 2024-01-16 RX ADMIN — Medication 125 MICROGRAM(S): at 05:55

## 2024-01-16 RX ADMIN — Medication 50 MILLIGRAM(S): at 21:12

## 2024-01-16 RX ADMIN — Medication 1 APPLICATION(S): at 05:57

## 2024-01-16 RX ADMIN — Medication 20 MILLIGRAM(S): at 13:20

## 2024-01-16 RX ADMIN — MONTELUKAST 10 MILLIGRAM(S): 4 TABLET, CHEWABLE ORAL at 13:21

## 2024-01-16 RX ADMIN — OLANZAPINE 10 MILLIGRAM(S): 15 TABLET, FILM COATED ORAL at 21:12

## 2024-01-16 RX ADMIN — Medication 3 MILLILITER(S): at 13:19

## 2024-01-16 RX ADMIN — CEFTRIAXONE 100 MILLIGRAM(S): 500 INJECTION, POWDER, FOR SOLUTION INTRAMUSCULAR; INTRAVENOUS at 00:58

## 2024-01-16 RX ADMIN — ATORVASTATIN CALCIUM 40 MILLIGRAM(S): 80 TABLET, FILM COATED ORAL at 21:12

## 2024-01-16 RX ADMIN — Medication 20 MILLIGRAM(S): at 05:57

## 2024-01-16 RX ADMIN — Medication 3 MILLILITER(S): at 17:55

## 2024-01-16 RX ADMIN — DULOXETINE HYDROCHLORIDE 60 MILLIGRAM(S): 30 CAPSULE, DELAYED RELEASE ORAL at 13:21

## 2024-01-16 RX ADMIN — HEPARIN SODIUM 5000 UNIT(S): 5000 INJECTION INTRAVENOUS; SUBCUTANEOUS at 17:56

## 2024-01-16 RX ADMIN — GABAPENTIN 100 MILLIGRAM(S): 400 CAPSULE ORAL at 17:55

## 2024-01-16 RX ADMIN — Medication 3 MILLILITER(S): at 05:56

## 2024-01-16 RX ADMIN — Medication 40 MILLIGRAM(S): at 05:54

## 2024-01-16 RX ADMIN — Medication 50 MILLIGRAM(S): at 05:54

## 2024-01-16 RX ADMIN — Medication 1 APPLICATION(S): at 17:55

## 2024-01-16 RX ADMIN — HEPARIN SODIUM 5000 UNIT(S): 5000 INJECTION INTRAVENOUS; SUBCUTANEOUS at 05:56

## 2024-01-16 RX ADMIN — Medication 50 MILLIGRAM(S): at 13:20

## 2024-01-16 RX ADMIN — Medication 20 MILLIGRAM(S): at 21:13

## 2024-01-16 RX ADMIN — GABAPENTIN 100 MILLIGRAM(S): 400 CAPSULE ORAL at 05:56

## 2024-01-16 NOTE — PROGRESS NOTE ADULT - SUBJECTIVE AND OBJECTIVE BOX
SHEKHAR VASQUEZ  MRN#: [id number]  Subjective:   pulmonary progress note  : SOB , Acute COPD  exacerbation , hypoxic in acute respiratory Failure , date of service is 2024   73-year-old          history of COPD on 2L NC at home, hyperthyroid, breast CA, asthma, CHF, chronic lymphedema, wheelchair bound,         presenting with difficulty breathing.     pt t has had cough and difficulty breathing for the past couple of days,  then had significant trouble breathing,      was found by EMS to be hypoxic to 68% on 2 L nasal cannula.     pt    was  placed on CPAP by EMS, which improved oxygen saturation., patient is less lethargic on BiPAP over night acceptable 02 saturation , no GI symptoms .no fever still with coughing , mild wheezing  , E coli  UTI, MRSA sepsis       pt  denies chest pain, syncope, fevers. (2024 09:52) on Antibiotics C/O pain and coughing , still   SOB  on excretion      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      History of carpal tunnel release  right wrist      History of parathyroidectomy      S/P laminectomy  now bed and wheelchair ridden with severe pain          FAMILY HISTORY:  Family history of myocardial infarction  mother  at age 67 and father at age 67 of MI's      OBJECTIVE:  ICU Vital Signs Last 24 Hrs  T(C): 36.9 (2024 11:28), Max: 37.2 (2024 13:58)  T(F): 98.4 (2024 11:28), Max: 99 (2024 13:58)  HR: 83 (2024 11:28) (78 - 93)  BP: 160/78 (2024 11:28) (160/78 - 185/101)  BP(mean): 110 (2024 16:50) (110 - 110)  ABP: --  ABP(mean): --  RR: 20 (2024 11:28) (19 - 20)  SpO2: 93% (2024 11:28) (93% - 99%)    O2 Parameters below as of 2024 11:28  Patient On (Oxygen Delivery Method): nasal cannula w/ humidification             @ 07:01  -   @ 13:53  --------------------------------------------------------  IN: 520 mL / OUT: 1200 mL / NET: -680 mL        PHYSICAL EXAM:Daily   morbidly obese female on 3 liter nasal 02 saturation is 93%  Daily   HEENT:     + NCAT  + EOMI  - Conjuctival edema   - Icterus   - Thrush   - ETT  - NGT/OGT  Neck:         + FROM    + JVD     - Nodes     - Masses    + Mid-line trachea   - Tracheostomy  Chest:       kyphotic deformities   Lungs:          + CTA   + Rhonchi    - Rales    + Wheezing     - Decreased BS   - Dullness R L  Cardiac:       + S1 + S2    + RRR   - Irregular   - S3  - S4    - Murmurs   - Rub   - Hamman’s sign   Abdomen:    + BS     + Soft    + Non-tender     - Distended    - Organomegaly  - PEG  Extremities:   - Cyanosis U/L   - Clubbing  U/L  + LE/UE Edema   + Capillary refill    + Pulses   Neuro:        + Awake   +  Alert   - Confused   - Lethargic   - Sedated   + Generalized Weakness  Skin:        - Rashes    - Erythema   + Normal incisions   + IV sites intact  - Sacral decubit      HOSPITAL MEDICATIONS: All mediciations reviewed and analyzed  MEDICATIONS  (STANDING):  albuterol    90 MICROgram(s) HFA Inhaler 2 Puff(s) Inhalation every 6 hours  albuterol/ipratropium for Nebulization 3 milliLiter(s) Nebulizer every 6 hours  aspirin enteric coated 81 milliGRAM(s) Oral daily  atorvastatin 40 milliGRAM(s) Oral at bedtime  busPIRone 20 milliGRAM(s) Oral <User Schedule>  cefTRIAXone   IVPB      DULoxetine 60 milliGRAM(s) Oral daily  furosemide   Injectable 40 milliGRAM(s) IV Push daily  gabapentin 100 milliGRAM(s) Oral two times a day  guaiFENesin  milliGRAM(s) Oral every 12 hours  heparin   Injectable 5000 Unit(s) SubCutaneous every 12 hours  hydrALAZINE 50 milliGRAM(s) Oral three times a day  influenza  Vaccine (HIGH DOSE) 0.7 milliLiter(s) IntraMuscular once  levothyroxine 125 MICROGram(s) Oral daily  losartan 100 milliGRAM(s) Oral daily  methylPREDNISolone sodium succinate Injectable 20 milliGRAM(s) IV Push three times a day  montelukast 10 milliGRAM(s) Oral daily  OLANZapine 10 milliGRAM(s) Oral at bedtime  propranolol 20 milliGRAM(s) Oral two times a day  tiotropium 2.5 MICROgram(s) Inhaler 2 Puff(s) Inhalation daily    MEDICATIONS  (PRN):    LABS: All Lab data reviewed and analyzed                         12.7   9.72  )-----------( 206      ( 2024 07:37 )    -16    140  |  97  |  20  ----------------------------<  159<H>  3.9   |  32<H>  |  0.64    Ca    9.8      2024 07:40                 40.3    Ca    9.9      2024 04:17      Ca    9.9      2024 04:17    TPro  6.9  /  Alb  3.7  /  TBili  1.0  /  DBili  x   /  AST  12  /  ALT  13  /  AlkPhos  104  01-13    PT/INR - ( 2024 01:21 )   PT: 12.2 sec;   INR: 1.17 ratio         PTT - ( 2024 01:21 )  PTT:74.2 sec LIVER FUNCTIONS - ( 2024 01:21 )  Alb: 3.7 g/dL / Pro: 6.9 g/dL / ALK PHOS: 104 U/L / ALT: 13 U/L / AST: 12 U/L / GGT: x           RADIOLOGY: - Reviewed and analyzed  SHEKHAR VASQUEZ  MRN#: [id number]  Subjective:   pulmonary progress note  : SOB , Acute COPD  exacerbation , hypoxic in acute respiratory Failure , date of service is 2024   73-year-old          history of COPD on 2L NC at home, hyperthyroid, breast CA, asthma, CHF, chronic lymphedema, wheelchair bound,         presenting with difficulty breathing.     pt t has had cough and difficulty breathing for the past couple of days,  then had significant trouble breathing,      was found by EMS to be hypoxic to 68% on 2 L nasal cannula.     pt    was  placed on CPAP by EMS, which improved oxygen saturation., patient is less lethargic on BiPAP over night acceptable 02 saturation , no GI symptoms .no fever still with coughing , mild wheezing  , E coli  UTI, MRSA sepsis       pt  denies chest pain, syncope, fevers. (2024 09:52) on Antibiotics C/O pain and coughing , still   SOB  on excretion      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      History of carpal tunnel release  right wrist      History of parathyroidectomy      S/P laminectomy  now bed and wheelchair ridden with severe pain          FAMILY HISTORY:  Family history of myocardial infarction  mother  at age 67 and father at age 67 of MI's      OBJECTIVE:  ICU Vital Signs Last 24 Hrs  T(C): 36.9 (2024 11:28), Max: 37.2 (2024 13:58)  T(F): 98.4 (2024 11:28), Max: 99 (2024 13:58)  HR: 83 (2024 11:28) (78 - 93)  BP: 160/78 (2024 11:28) (160/78 - 185/101)  BP(mean): 110 (2024 16:50) (110 - 110)  ABP: --  ABP(mean): --  RR: 20 (2024 11:28) (19 - 20)  SpO2: 93% (2024 11:28) (93% - 99%)    O2 Parameters below as of 2024 11:28  Patient On (Oxygen Delivery Method): nasal cannula w/ humidification             @ 07:01  -   @ 13:53  --------------------------------------------------------  IN: 520 mL / OUT: 1200 mL / NET: -680 mL        PHYSICAL EXAM:Daily   morbidly obese female on 3 liter nasal 02 saturation is 93%  Daily   HEENT:     + NCAT  + EOMI  - Conjuctival edema   - Icterus   - Thrush   - ETT  - NGT/OGT  Neck:         + FROM    + JVD     - Nodes     - Masses    + Mid-line trachea   - Tracheostomy  Chest:       kyphotic deformities   Lungs:          + CTA   + Rhonchi    - Rales    + Wheezing     - Decreased BS   - Dullness R L  Cardiac:       + S1 + S2    + RRR   - Irregular   - S3  - S4    - Murmurs   - Rub   - Hamman’s sign   Abdomen:    + BS     + Soft    + Non-tender     - Distended    - Organomegaly  - PEG  Extremities:   - Cyanosis U/L   - Clubbing  U/L  + LE/UE Edema   + Capillary refill    + Pulses   Neuro:        + Awake   +  Alert   - Confused   - Lethargic   - Sedated   + Generalized Weakness  Skin:        - Rashes    - Erythema   + Normal incisions   + IV sites intact  - Sacral decubit      HOSPITAL MEDICATIONS: All mediciations reviewed and analyzed  MEDICATIONS  (STANDING):  albuterol    90 MICROgram(s) HFA Inhaler 2 Puff(s) Inhalation every 6 hours  albuterol/ipratropium for Nebulization 3 milliLiter(s) Nebulizer every 6 hours  aspirin enteric coated 81 milliGRAM(s) Oral daily  atorvastatin 40 milliGRAM(s) Oral at bedtime  busPIRone 20 milliGRAM(s) Oral <User Schedule>  cefTRIAXone   IVPB      DULoxetine 60 milliGRAM(s) Oral daily  furosemide   Injectable 40 milliGRAM(s) IV Push daily  gabapentin 100 milliGRAM(s) Oral two times a day  guaiFENesin  milliGRAM(s) Oral every 12 hours  heparin   Injectable 5000 Unit(s) SubCutaneous every 12 hours  hydrALAZINE 50 milliGRAM(s) Oral three times a day  influenza  Vaccine (HIGH DOSE) 0.7 milliLiter(s) IntraMuscular once  levothyroxine 125 MICROGram(s) Oral daily  losartan 100 milliGRAM(s) Oral daily  methylPREDNISolone sodium succinate Injectable 20 milliGRAM(s) IV Push three times a day  montelukast 10 milliGRAM(s) Oral daily  OLANZapine 10 milliGRAM(s) Oral at bedtime  propranolol 20 milliGRAM(s) Oral two times a day  tiotropium 2.5 MICROgram(s) Inhaler 2 Puff(s) Inhalation daily    MEDICATIONS  (PRN):    LABS: All Lab data reviewed and analyzed                         12.7   9.72  )-----------( 206      ( 2024 07:37 )    -16    140  |  97  |  20  ----------------------------<  159<H>  3.9   |  32<H>  |  0.64    Ca    9.8      2024 07:40                 40.3    Ca    9.9      2024 04:17      Ca    9.9      2024 04:17    TPro  6.9  /  Alb  3.7  /  TBili  1.0  /  DBili  x   /  AST  12  /  ALT  13  /  AlkPhos  104  01-13    PT/INR - ( 2024 01:21 )   PT: 12.2 sec;   INR: 1.17 ratio         PTT - ( 2024 01:21 )  PTT:74.2 sec LIVER FUNCTIONS - ( 2024 01:21 )  Alb: 3.7 g/dL / Pro: 6.9 g/dL / ALK PHOS: 104 U/L / ALT: 13 U/L / AST: 12 U/L / GGT: x           RADIOLOGY: - Reviewed and analyzed

## 2024-01-16 NOTE — PROGRESS NOTE ADULT - ASSESSMENT
73-year-old female with a past medical history of COPD on 2 L nasal cannula at home, hypothyroidism, breast cancer, asthma, CHF, chronic lymphedema who was admitted to the hospital due to shortness of breath.    Patient reports shortness of breath over the past few days.  Reports son is having similar symptoms.    On day of admission, EMS called to her home where she was found to be 68% on 2 L.  Patient was then placed on CPAP and brought to the hospital.  Patient denied any other symptoms on admission including fever, syncope, chest pain.  Blood cultures were obtained on admission growing MRSE, patient was given vancomycin.    In the ED, Tmax 100.9, no leukocytosis on labs.  RVP positive for human metapneumovirus.  Urinalysis also with pyuria, many bacteria.  CT chest was obtained showing a left lower lobe consolidation.    #Positive blood cultures, MRSE detection  #Human metapneumovirus infection  #Abnormal lung imaging, left lower lobe opacity  #Positive urine culture, no urinary symptoms  Patient on 2-3L supplemental O2, home requirements, otherwise not in respiratory distress  No urinary symptoms ESBL in urine    Recommendation  MRSE likely contaminant, patient is not septic  Can continue ceftriaxone 1 g every 24 hours at this time for pneumonia, plan for course, end date: 1/21/24  If otherwise ready for discharge, can switch to PO amoxicillin/clav [id number] BID to complete course  Follow repeat blood cultures  Follow fever curve and WBC count    ID to sign off. Please contact as issues arise.    Refugio Osman MD  Division of Infectious Diseases

## 2024-01-16 NOTE — PHYSICAL THERAPY INITIAL EVALUATION ADULT - ADDITIONAL COMMENTS
As per pt, lives with spouse and disabled son in private COOP with no steps to negotiate. PTA, pt required assistance with all mobility. Pt was able to take a few steps with rolling walker and min A from bed-w/c, however recently becoming more and more difficult. Pt has home health aide 7 days/week for 9 1/2 hours/day.  Pt has rolling walker, w/c and peter lift at home. (States does not use peter lift)

## 2024-01-16 NOTE — PHYSICAL THERAPY INITIAL EVALUATION ADULT - MANUAL MUSCLE TESTING RESULTS, REHAB EVAL
grossly at least 3/5 throughout all extremities, except knee/hip flexion limited 2/2 chronic lymphedema B/L LE 2-/5, BUE 3-/5; limited 2/2 weakness, edema & soft tissue approximation/grossly assessed due to

## 2024-01-16 NOTE — PROGRESS NOTE ADULT - ASSESSMENT
_________________________________________________________________________________________  ========>>  M E D I C A L   A T T E N D I N G    F O L L O W  U P  N O T E  <<=========  -----------------------------------------------------------------------------------------------------    - Patient seen and examined by me earlier today.   - In summary,  SHEKHAR VASQUEZ is a 73y year old woman admitted with SOB  - Patient today overall doing ok, comfortable, eating OK. + occasional cough  >>> overall improved , asking about going home     right thumb >> + fungal infection of right thumb tip and nail with skin cracking.. >> ordered antifungal cream.. >>> improved     ==================>> REVIEW OF SYSTEM <<=================    GEN: no fever, no chills, overall doing ok   RESP: no SOB at rest , no cough, no sputum  CVS: no chest pain, no palpitations  GI: no abdominal pain, no nausea, no BM X 2 days : monitoring   : no dysuria, no frequency  Neuro: no headache, no dizziness    ==================>> PHYSICAL EXAM <<=================    GEN: A&O X 3 , NAD , comfortable, pleasant, calm   HEENT: NCAT, PERRL, MMM, hearing intact  CVS: S1S2 , regular , No M/R/G appreciated  PULM: CTA B/L,  limited exam due to body habitus.   ABD.: soft. non tender, non distended,  obese   Extrem: intact pulses , ++ chronic lymphedema changes >> to ACE wrap        ( Note written / Date of service 01-16-24 ( This is certified to be the same as "ENTERED" date above ( for billing purposes)))    ==================>> MEDICATIONS <<====================    albuterol    90 MICROgram(s) HFA Inhaler 2 Puff(s) Inhalation every 6 hours  albuterol/ipratropium for Nebulization 3 milliLiter(s) Nebulizer every 6 hours  aspirin enteric coated 81 milliGRAM(s) Oral daily  atorvastatin 40 milliGRAM(s) Oral at bedtime  busPIRone 20 milliGRAM(s) Oral <User Schedule>  cefTRIAXone   IVPB      cefTRIAXone   IVPB 2000 milliGRAM(s) IV Intermittent every 24 hours  clotrimazole 1% Cream 1 Application(s) Topical every 12 hours  DULoxetine 60 milliGRAM(s) Oral daily  furosemide    Tablet 40 milliGRAM(s) Oral daily  gabapentin 100 milliGRAM(s) Oral two times a day  guaiFENesin  milliGRAM(s) Oral every 12 hours  heparin   Injectable 5000 Unit(s) SubCutaneous every 12 hours  hydrALAZINE 50 milliGRAM(s) Oral three times a day  influenza  Vaccine (HIGH DOSE) 0.7 milliLiter(s) IntraMuscular once  levothyroxine 125 MICROGram(s) Oral daily  losartan 100 milliGRAM(s) Oral daily  methylPREDNISolone sodium succinate Injectable 20 milliGRAM(s) IV Push three times a day  montelukast 10 milliGRAM(s) Oral daily  OLANZapine 10 milliGRAM(s) Oral at bedtime  propranolol 20 milliGRAM(s) Oral two times a day  tiotropium 2.5 MICROgram(s) Inhaler 2 Puff(s) Inhalation daily    MEDICATIONS  (PRN):  acetaminophen     Tablet .. 650 milliGRAM(s) Oral every 6 hours PRN Temp greater or equal to 38C (100.4F), Mild Pain (1 - 3)    ___________  Active diet:  Diet, Regular  ___________________    ==================>> VITAL SIGNS <<==================    Weight (kg): 112.7  Vital Signs Last 24 HrsT(C): 36.6 (01-16-24 @ 13:39)  T(F): 97.9 (01-16-24 @ 13:39), Max: 98.1 (01-15-24 @ 20:55)  HR: 69 (01-16-24 @ 13:39) (69 - 85)  BP: 179/90 (01-16-24 @ 13:39)  RR: 18 (01-16-24 @ 05:52) (18 - 18)  SpO2: 94% (01-16-24 @ 13:39) (92% - 98%)       ==================>> LAB AND IMAGING <<==================                        12.7   9.72  )-----------( 206      ( 16 Jan 2024 07:37 )             40.3        01-16    140  |  97  |  20  ----------------------------<  159<H>  3.9   |  32<H>  |  0.64    Ca    9.8      16 Jan 2024 07:40    WBC count:   9.72 <<== ,  9.18 <<== ,  9.76 <<==   Hemoglobin:   12.7 <<==,  12.9 <<==,  12.0 <<==  platelets:  206 <==, 188 <==, 153 <==    Creatinine:  0.64  <<==, 0.54  <<==, 0.67  <<==  Sodium:   140  <==, 141  <==, 137  <==    ____________________________    M I C R O B I O L O G Y :    Culture - Blood (collected 14 Jan 2024 04:46)  Source: .Blood Blood-Peripheral  Preliminary Report (16 Jan 2024 09:01):    No growth at 48 Hours    Culture - Blood (collected 14 Jan 2024 04:46)  Source: .Blood Blood-Peripheral  Preliminary Report (16 Jan 2024 09:01):    No growth at 48 Hours    Culture - Urine (collected 13 Jan 2024 02:08)  Source: Clean Catch Clean Catch (Midstream)  Preliminary Report (14 Jan 2024 23:36):    >100,000 CFU/ml Escherichia coli    Culture - Blood (collected 13 Jan 2024 00:45)  Source: .Blood Blood-Peripheral  Gram Stain (13 Jan 2024 23:35):    Growth in aerobic bottle: Gram Positive Cocci in Clusters  Final Report (14 Jan 2024 21:03):    Growth in aerobic bottle: Staphylococcus epidermidis    Isolation of Coagulase negative Staphylococcus from single blood culture    sets may represent    contamination. Contact the Microbiology Department at [phone] if    susceptibility testing is    clinically indicated.    Culture - Blood (collected 13 Jan 2024 00:30)  Source: .Blood Blood-Peripheral  Gram Stain (13 Jan 2024 20:50):    Growth in aerobic bottle: Gram Positive Cocci in Clusters  Final Report (15 Alan 2024 17:47):    Growth in aerobic bottle: Staphylococcus hominis "Susceptibilities not    performed"    Growth in aerobic bottle: Staphylococcus epidermidis    Direct identification is available within approximately 3-5    hours either by Blood Panel Multiplexed PCR or Direct    MALDI-TOF. Details: [email or web address]  Organism: Blood Culture PCR  Staphylococcus epidermidis (15 Alan 2024 17:47)  Organism: Staphylococcus epidermidis (15 Alan 2024 17:47)    Sensitivities:      Method Type: MARIA M      -  Ampicillin/Sulbactam: R <=8/4      -  Cefazolin: R <=4      -  Clindamycin: S <=0.25      -  Erythromycin: S <=0.25      -  Gentamicin: S <=1 Should not be used as monotherapy      -  Oxacillin: R >2      -  Penicillin: R 0.5      -  Rifampin: S <=1 Should not be used as monotherapy      -  Tetracycline: S <=1      -  Trimethoprim/Sulfamethoxazole: R >2/38      -  Vancomycin: S 1  Organism: Blood Culture PCR (15 Alan 2024 17:47)    Sensitivities:      Method Type: PCR      -  Staphylococcus epidermidis, Methicillin resistant: Detec    SARS-CoV-2: Jayc (01-13-24 @ 01:48)    < from: CT Chest No Cont (01.13.24 @ 14:55) >  IMPRESSION:  Left lower lobe consolidation likely representing pneumonia. Tree-in-bud   opacities within posterior left upper lobe. Recommend follow-up chest CT   in 1-3 months to determine resolution.  Emphysema..  < end of copied text >      ECHO, LE Duplex pending    ___________________________________________________________________________________  ===============>>  A S S E S S M E N T   A N D   P L A N <<===============  ------------------------------------------------------------------------------------------    · Assessment	   73-year-old woman with h/o  COPD on 2L NC at home, hypothyroid,  breast CA, asthma, chronic lymphedema, wheelchair bound, obesity       presented with difficulty breathing./   had cough and difficulty breathing for the past couple of days,        then had significant trouble breathing, . had  worsened     was found by EMS to be hypoxic to 68% on 2 L nasal cannula.  /  was  placed on CPAP by EMS, which improved oxygen sat/ /  denies chest pain, syncope, fevers.      admitted wth sob, + HMPV    acute  copd  exacerbation.  on home oxygen        on  steroid /  Proventil   spiriva         CT showing pneumonia         MRSA staph epi Bacteremia > ? contamination  >>> ID following          UTI on abx  Pulm and ID following, appreciated  on antibiotics   follow cultures/ sensitivities       h/o   acute on  c/c diastolic  chf    on iv lasix, per  card       HTN.  HLD    Continue Current medications otherwise and monitor.    adjust medications as needed per cardio     h/o esophageal   dysmotility   c/c  lymphedema       continue current management    PT / OOB as able ( patient at baseline not very active)     hope for DC planing home in next couple of days... !    --------------------------------------------  Case discussed with patient, family at bedside  ..  Education given on findings and plan of care  ___________________________  H. JANICE Welch.  Pager: [phone]       _________________________________________________________________________________________  ========>>  M E D I C A L   A T T E N D I N G    F O L L O W  U P  N O T E  <<=========  -----------------------------------------------------------------------------------------------------    - Patient seen and examined by me earlier today.   - In summary,  SHEKHAR VASQUEZ is a 73y year old woman admitted with SOB  - Patient today overall doing ok, comfortable, eating OK. + occasional cough  >>> overall improved , asking about going home     right thumb >> + fungal infection of right thumb tip and nail with skin cracking.. >> ordered antifungal cream.. >>> improved     ==================>> REVIEW OF SYSTEM <<=================    GEN: no fever, no chills, overall doing ok   RESP: no SOB at rest , no cough, no sputum  CVS: no chest pain, no palpitations  GI: no abdominal pain, no nausea, no BM X 2 days : monitoring   : no dysuria, no frequency  Neuro: no headache, no dizziness    ==================>> PHYSICAL EXAM <<=================    GEN: A&O X 3 , NAD , comfortable, pleasant, calm   HEENT: NCAT, PERRL, MMM, hearing intact  CVS: S1S2 , regular , No M/R/G appreciated  PULM: CTA B/L,  limited exam due to body habitus.   ABD.: soft. non tender, non distended,  obese   Extrem: intact pulses , ++ chronic lymphedema changes >> to ACE wrap        ( Note written / Date of service 01-16-24 ( This is certified to be the same as "ENTERED" date above ( for billing purposes)))    ==================>> MEDICATIONS <<====================    albuterol    90 MICROgram(s) HFA Inhaler 2 Puff(s) Inhalation every 6 hours  albuterol/ipratropium for Nebulization 3 milliLiter(s) Nebulizer every 6 hours  aspirin enteric coated 81 milliGRAM(s) Oral daily  atorvastatin 40 milliGRAM(s) Oral at bedtime  busPIRone 20 milliGRAM(s) Oral <User Schedule>  cefTRIAXone   IVPB      cefTRIAXone   IVPB 2000 milliGRAM(s) IV Intermittent every 24 hours  clotrimazole 1% Cream 1 Application(s) Topical every 12 hours  DULoxetine 60 milliGRAM(s) Oral daily  furosemide    Tablet 40 milliGRAM(s) Oral daily  gabapentin 100 milliGRAM(s) Oral two times a day  guaiFENesin  milliGRAM(s) Oral every 12 hours  heparin   Injectable 5000 Unit(s) SubCutaneous every 12 hours  hydrALAZINE 50 milliGRAM(s) Oral three times a day  influenza  Vaccine (HIGH DOSE) 0.7 milliLiter(s) IntraMuscular once  levothyroxine 125 MICROGram(s) Oral daily  losartan 100 milliGRAM(s) Oral daily  methylPREDNISolone sodium succinate Injectable 20 milliGRAM(s) IV Push three times a day  montelukast 10 milliGRAM(s) Oral daily  OLANZapine 10 milliGRAM(s) Oral at bedtime  propranolol 20 milliGRAM(s) Oral two times a day  tiotropium 2.5 MICROgram(s) Inhaler 2 Puff(s) Inhalation daily    MEDICATIONS  (PRN):  acetaminophen     Tablet .. 650 milliGRAM(s) Oral every 6 hours PRN Temp greater or equal to 38C (100.4F), Mild Pain (1 - 3)    ___________  Active diet:  Diet, Regular  ___________________    ==================>> VITAL SIGNS <<==================    Weight (kg): 112.7  Vital Signs Last 24 HrsT(C): 36.6 (01-16-24 @ 13:39)  T(F): 97.9 (01-16-24 @ 13:39), Max: 98.1 (01-15-24 @ 20:55)  HR: 69 (01-16-24 @ 13:39) (69 - 85)  BP: 179/90 (01-16-24 @ 13:39)  RR: 18 (01-16-24 @ 05:52) (18 - 18)  SpO2: 94% (01-16-24 @ 13:39) (92% - 98%)       ==================>> LAB AND IMAGING <<==================                        12.7   9.72  )-----------( 206      ( 16 Jan 2024 07:37 )             40.3        01-16    140  |  97  |  20  ----------------------------<  159<H>  3.9   |  32<H>  |  0.64    Ca    9.8      16 Jan 2024 07:40    WBC count:   9.72 <<== ,  9.18 <<== ,  9.76 <<==   Hemoglobin:   12.7 <<==,  12.9 <<==,  12.0 <<==  platelets:  206 <==, 188 <==, 153 <==    Creatinine:  0.64  <<==, 0.54  <<==, 0.67  <<==  Sodium:   140  <==, 141  <==, 137  <==    ____________________________    M I C R O B I O L O G Y :    Culture - Blood (collected 14 Jan 2024 04:46)  Source: .Blood Blood-Peripheral  Preliminary Report (16 Jan 2024 09:01):    No growth at 48 Hours    Culture - Blood (collected 14 Jan 2024 04:46)  Source: .Blood Blood-Peripheral  Preliminary Report (16 Jan 2024 09:01):    No growth at 48 Hours    Culture - Urine (collected 13 Jan 2024 02:08)  Source: Clean Catch Clean Catch (Midstream)  Preliminary Report (14 Jan 2024 23:36):    >100,000 CFU/ml Escherichia coli    Culture - Blood (collected 13 Jan 2024 00:45)  Source: .Blood Blood-Peripheral  Gram Stain (13 Jan 2024 23:35):    Growth in aerobic bottle: Gram Positive Cocci in Clusters  Final Report (14 Jan 2024 21:03):    Growth in aerobic bottle: Staphylococcus epidermidis    Isolation of Coagulase negative Staphylococcus from single blood culture    sets may represent    contamination. Contact the Microbiology Department at [phone] if    susceptibility testing is    clinically indicated.    Culture - Blood (collected 13 Jan 2024 00:30)  Source: .Blood Blood-Peripheral  Gram Stain (13 Jan 2024 20:50):    Growth in aerobic bottle: Gram Positive Cocci in Clusters  Final Report (15 Alan 2024 17:47):    Growth in aerobic bottle: Staphylococcus hominis "Susceptibilities not    performed"    Growth in aerobic bottle: Staphylococcus epidermidis    Direct identification is available within approximately 3-5    hours either by Blood Panel Multiplexed PCR or Direct    MALDI-TOF. Details: [email or web address]  Organism: Blood Culture PCR  Staphylococcus epidermidis (15 Alan 2024 17:47)  Organism: Staphylococcus epidermidis (15 Alan 2024 17:47)    Sensitivities:      Method Type: MARIA M      -  Ampicillin/Sulbactam: R <=8/4      -  Cefazolin: R <=4      -  Clindamycin: S <=0.25      -  Erythromycin: S <=0.25      -  Gentamicin: S <=1 Should not be used as monotherapy      -  Oxacillin: R >2      -  Penicillin: R 0.5      -  Rifampin: S <=1 Should not be used as monotherapy      -  Tetracycline: S <=1      -  Trimethoprim/Sulfamethoxazole: R >2/38      -  Vancomycin: S 1  Organism: Blood Culture PCR (15 Alan 2024 17:47)    Sensitivities:      Method Type: PCR      -  Staphylococcus epidermidis, Methicillin resistant: Detec    SARS-CoV-2: Jayc (01-13-24 @ 01:48)    < from: CT Chest No Cont (01.13.24 @ 14:55) >  IMPRESSION:  Left lower lobe consolidation likely representing pneumonia. Tree-in-bud   opacities within posterior left upper lobe. Recommend follow-up chest CT   in 1-3 months to determine resolution.  Emphysema..  < end of copied text >      ECHO, LE Duplex pending    ___________________________________________________________________________________  ===============>>  A S S E S S M E N T   A N D   P L A N <<===============  ------------------------------------------------------------------------------------------    · Assessment	   73-year-old woman with h/o  COPD on 2L NC at home, hypothyroid,  breast CA, asthma, chronic lymphedema, wheelchair bound, obesity       presented with difficulty breathing./   had cough and difficulty breathing for the past couple of days,        then had significant trouble breathing, . had  worsened     was found by EMS to be hypoxic to 68% on 2 L nasal cannula.  /  was  placed on CPAP by EMS, which improved oxygen sat/ /  denies chest pain, syncope, fevers.      admitted wth sob, + HMPV    acute  copd  exacerbation.  on home oxygen        on  steroid /  Proventil   spiriva         CT showing pneumonia         MRSA staph epi Bacteremia > ? contamination  >>> ID following          UTI on abx  Pulm and ID following, appreciated  on antibiotics   follow cultures/ sensitivities       h/o   acute on  c/c diastolic  chf    on iv lasix, per  card       HTN.  HLD    Continue Current medications otherwise and monitor.    adjust medications as needed per cardio     h/o esophageal   dysmotility   c/c  lymphedema       continue current management    PT / OOB as able ( patient at baseline not very active)     hope for DC planing home in next couple of days... !    --------------------------------------------  Case discussed with patient, family at bedside  ..  Education given on findings and plan of care  ___________________________  H. JANICE Welch.  Pager: [phone]

## 2024-01-16 NOTE — PROGRESS NOTE ADULT - SUBJECTIVE AND OBJECTIVE BOX
Cardiovascular Disease Progress Note    Overnight events: No acute events overnight.  no new cardiac sx  Otherwise review of systems negative    Objective Findings:  T(C): 36.7 (01-16-24 @ 05:52), Max: 36.7 (01-15-24 @ 20:55)  HR: 72 (01-16-24 @ 05:52) (70 - 85)  BP: 124/66 (01-16-24 @ 05:52) (124/66 - 173/78)  RR: 18 (01-16-24 @ 05:52) (18 - 18)  SpO2: 98% (01-16-24 @ 05:52) (94% - 98%)  Wt(kg): --  Daily     Daily       Physical Exam:  Gen: NAD  HEENT: EOMI  CV: RRR, normal S1 + S2, no m/r/g  Lungs: CTAB  Abd: soft, non-tender  Ext: No edema    Telemetry:    Laboratory Data:                        12.9   9.18  )-----------( 188      ( 15 Alan 2024 06:06 )             40.1                     Inpatient Medications:  MEDICATIONS  (STANDING):  albuterol    90 MICROgram(s) HFA Inhaler 2 Puff(s) Inhalation every 6 hours  albuterol/ipratropium for Nebulization 3 milliLiter(s) Nebulizer every 6 hours  aspirin enteric coated 81 milliGRAM(s) Oral daily  atorvastatin 40 milliGRAM(s) Oral at bedtime  busPIRone 20 milliGRAM(s) Oral <User Schedule>  cefTRIAXone   IVPB      cefTRIAXone   IVPB 2000 milliGRAM(s) IV Intermittent every 24 hours  clotrimazole 1% Cream 1 Application(s) Topical every 12 hours  DULoxetine 60 milliGRAM(s) Oral daily  furosemide   Injectable 40 milliGRAM(s) IV Push daily  gabapentin 100 milliGRAM(s) Oral two times a day  guaiFENesin  milliGRAM(s) Oral every 12 hours  heparin   Injectable 5000 Unit(s) SubCutaneous every 12 hours  hydrALAZINE 50 milliGRAM(s) Oral three times a day  influenza  Vaccine (HIGH DOSE) 0.7 milliLiter(s) IntraMuscular once  levothyroxine 125 MICROGram(s) Oral daily  losartan 100 milliGRAM(s) Oral daily  methylPREDNISolone sodium succinate Injectable 20 milliGRAM(s) IV Push three times a day  montelukast 10 milliGRAM(s) Oral daily  OLANZapine 10 milliGRAM(s) Oral at bedtime  propranolol 20 milliGRAM(s) Oral two times a day  tiotropium 2.5 MICROgram(s) Inhaler 2 Puff(s) Inhalation daily      Assessment:  73-year-old history of COPD on 2L NC at home, hyperthyroid, breast CA, asthma, CHF, chronic lymphedema, wheelchair bound, presenting with difficulty breathing.  Patient has had cough and difficulty breathing for the past couple of days, today had significant trouble breathing, was found by EMS to be hypoxic to 68% on 2 L nasal cannula.  Patient placed on CPAP by EMS, which improved oxygen saturation.  Patient denies chest pain, syncope, fevers.    Recs:  cardiac stable  dyspnea likely in setting of hmpv and bacterial pneumonia  gentle diuresis. can transition to po lasix  pulmonary consulted, recs appreciated; steroids and bronchodilators per pulmonary  infectious workup and abx per ID  obtain echo (pos blood cultures)  supplemental o2, wean as able. resp status improving  dvt ppx          Over 25 minutes spent on total encounter; more than 50% of the visit was spent counseling and/or coordinating care by the attending physician.      Juan Salcedo MD   Cardiovascular Disease  [phone] Cardiovascular Disease Progress Note    Overnight events: No acute events overnight.  no new cardiac sx  Otherwise review of systems negative    Objective Findings:  T(C): 36.7 (01-16-24 @ 05:52), Max: 36.7 (01-15-24 @ 20:55)  HR: 72 (01-16-24 @ 05:52) (70 - 85)  BP: 124/66 (01-16-24 @ 05:52) (124/66 - 173/78)  RR: 18 (01-16-24 @ 05:52) (18 - 18)  SpO2: 98% (01-16-24 @ 05:52) (94% - 98%)  Wt(kg): --  Daily     Daily       Physical Exam:  Gen: NAD  HEENT: EOMI  CV: RRR, normal S1 + S2, no m/r/g  Lungs: CTAB  Abd: soft, non-tender  Ext: No edema    Telemetry:    Laboratory Data:                        12.9   9.18  )-----------( 188      ( 15 Alan 2024 06:06 )             40.1                     Inpatient Medications:  MEDICATIONS  (STANDING):  albuterol    90 MICROgram(s) HFA Inhaler 2 Puff(s) Inhalation every 6 hours  albuterol/ipratropium for Nebulization 3 milliLiter(s) Nebulizer every 6 hours  aspirin enteric coated 81 milliGRAM(s) Oral daily  atorvastatin 40 milliGRAM(s) Oral at bedtime  busPIRone 20 milliGRAM(s) Oral <User Schedule>  cefTRIAXone   IVPB      cefTRIAXone   IVPB 2000 milliGRAM(s) IV Intermittent every 24 hours  clotrimazole 1% Cream 1 Application(s) Topical every 12 hours  DULoxetine 60 milliGRAM(s) Oral daily  furosemide   Injectable 40 milliGRAM(s) IV Push daily  gabapentin 100 milliGRAM(s) Oral two times a day  guaiFENesin  milliGRAM(s) Oral every 12 hours  heparin   Injectable 5000 Unit(s) SubCutaneous every 12 hours  hydrALAZINE 50 milliGRAM(s) Oral three times a day  influenza  Vaccine (HIGH DOSE) 0.7 milliLiter(s) IntraMuscular once  levothyroxine 125 MICROGram(s) Oral daily  losartan 100 milliGRAM(s) Oral daily  methylPREDNISolone sodium succinate Injectable 20 milliGRAM(s) IV Push three times a day  montelukast 10 milliGRAM(s) Oral daily  OLANZapine 10 milliGRAM(s) Oral at bedtime  propranolol 20 milliGRAM(s) Oral two times a day  tiotropium 2.5 MICROgram(s) Inhaler 2 Puff(s) Inhalation daily      Assessment:  73-year-old history of COPD on 2L NC at home, hyperthyroid, breast CA, asthma, CHF, chronic lymphedema, wheelchair bound, presenting with difficulty breathing.  Patient has had cough and difficulty breathing for the past couple of days, today had significant trouble breathing, was found by EMS to be hypoxic to 68% on 2 L nasal cannula.  Patient placed on CPAP by EMS, which improved oxygen saturation.  Patient denies chest pain, syncope, fevers.    Recs:  cardiac stable  dyspnea likely in setting of hmpv and bacterial pneumonia  gentle diuresis. can transition to po lasix  pulmonary consulted, recs appreciated; steroids and bronchodilators per pulmonary  infectious workup and abx per ID  obtain echo (pos blood cultures)  supplemental o2, wean as able. resp status improving  dvt ppx          Over 25 minutes spent on total encounter; more than 50% of the visit was spent counseling and/or coordinating care by the attending physician.      Juan Salcedo MD   Cardiovascular Disease  [phone]

## 2024-01-16 NOTE — PROGRESS NOTE ADULT - SUBJECTIVE AND OBJECTIVE BOX
Follow Up:  positive blood cultures    Interval History/ROS:  Overnight: No acute events.  Patient remains afebrile.  Otherwise hemodynamically stable.  Latest labs show no leukocytosis, BMP with renal function within normal limits.  Reviewed blood cultures obtained 1/14/2024 remain negative to date.  Urine culture on 1/13/2024 with ESBL E. coli.  CT chest obtained on 1/13/2024 shows a left lower lobe consolidation.    Patient seen examined at bedside.  Appears comfortable.  No new complaints.    Allergies  Grapes (Hives)  No Known Drug Allergies  Peaches (Hives)  shellfish (Hives)        ANTIMICROBIALS:  cefTRIAXone   IVPB    cefTRIAXone   IVPB 2000 every 24 hours      OTHER MEDS:  MEDICATIONS  (STANDING):  acetaminophen     Tablet .. 650 every 6 hours PRN  albuterol    90 MICROgram(s) HFA Inhaler 2 every 6 hours  albuterol/ipratropium for Nebulization 3 every 6 hours  aspirin enteric coated 81 daily  atorvastatin 40 at bedtime  busPIRone 20 <User Schedule>  DULoxetine 60 daily  furosemide    Tablet 40 daily  gabapentin 100 two times a day  guaiFENesin  every 12 hours  heparin   Injectable 5000 every 12 hours  hydrALAZINE 50 three times a day  influenza  Vaccine (HIGH DOSE) 0.7 once  levothyroxine 125 daily  losartan 100 daily  methylPREDNISolone sodium succinate Injectable 20 three times a day  montelukast 10 daily  OLANZapine 10 at bedtime  propranolol 20 two times a day  tiotropium 2.5 MICROgram(s) Inhaler 2 daily      Vital Signs Last 24 Hrs  T(C): 36.6 (16 Jan 2024 13:39), Max: 36.7 (15 Alan 2024 20:55)  T(F): 97.9 (16 Jan 2024 13:39), Max: 98.1 (15 Alan 2024 20:55)  HR: 85 (16 Jan 2024 16:09) (69 - 85)  BP: 159/84 (16 Jan 2024 16:09) (124/66 - 179/90)  BP(mean): --  RR: 18 (16 Jan 2024 05:52) (18 - 18)  SpO2: 94% (16 Jan 2024 13:39) (92% - 98%)    Parameters below as of 16 Jan 2024 13:39  Patient On (Oxygen Delivery Method): nasal cannula  O2 Flow (L/min): 2      PHYSICAL EXAM:  HEENT: NCAT, EOMI, PERRL, no oral lesions  CV: S1+S2, no m/r/g appreciated   Lungs: No respiratory distress, CTAB  Abd: Soft, nontender, no guarding, no rebound tenderness, + bowel sounds   : No suprapubic tenderness  Neuro: Alert and oriented to time, place and person. No focal deficits noted.   Ext: No cyanosis, chronic lymphedema BLE  Skin: No rash, no phlebitis                              12.7   9.72  )-----------( 206      ( 16 Jan 2024 07:37 )             40.3       01-16    140  |  97  |  20  ----------------------------<  159<H>  3.9   |  32<H>  |  0.64    Ca    9.8      16 Jan 2024 07:40        Urinalysis Basic - ( 16 Jan 2024 07:40 )    Color: x / Appearance: x / SG: x / pH: x  Gluc: 159 mg/dL / Ketone: x  / Bili: x / Urobili: x   Blood: x / Protein: x / Nitrite: x   Leuk Esterase: x / RBC: x / WBC x   Sq Epi: x / Non Sq Epi: x / Bacteria: x        MICROBIOLOGY:  v    Culture - Blood (collected 14 Jan 2024 04:46)  Source: .Blood Blood-Peripheral  Preliminary Report (16 Jan 2024 09:01):    No growth at 48 Hours    Culture - Blood (collected 14 Jan 2024 04:46)  Source: .Blood Blood-Peripheral  Preliminary Report (16 Jan 2024 09:01):    No growth at 48 Hours    Culture - Urine (collected 13 Jan 2024 02:08)  Source: Clean Catch Clean Catch (Midstream)  Final Report (16 Jan 2024 16:21):    >100,000 CFU/ml Escherichia coli ESBL  Organism: Escherichia coli ESBL (16 Jan 2024 16:21)  Organism: Escherichia coli ESBL (16 Jan 2024 16:21)      Method Type: MARIA M      -  Amoxicillin/Clavulanic Acid: S <=8/4      -  Ampicillin: R >16 These ampicillin results predict results for amoxicillin      -  Ampicillin/Sulbactam: I 16/8      -  Aztreonam: R 16      -  Cefazolin: R >16 For uncomplicated UTI with K. pneumoniae, E. coli, or P. mirablis: MARIA M <=16 is sensitive and MARIA M >=32 is resistant. This also predicts results for oral agents cefaclor, cefdinir, cefpodoxime, cefprozil, cefuroxime axetil, cephalexin and locarbef for uncomplicated UTI. Note that some isolates may be susceptible to these agents while testing resistant to cefazolin.      -  Cefepime: R 8      -  Ceftriaxone: R >32      -  Cefuroxime: R >16      -  Ciprofloxacin: R >2      -  Ertapenem: S <=0.5      -  Gentamicin: S <=2      -  Imipenem: S <=1      -  Levofloxacin: R >4      -  Meropenem: S <=1      -  Nitrofurantoin: S <=32 Should not be used to treat pyelonephritis      -  Piperacillin/Tazobactam: S <=8      -  Tobramycin: S <=2      -  Trimethoprim/Sulfamethoxazole: R >2/38    Culture - Blood (collected 13 Jan 2024 00:45)  Source: .Blood Blood-Peripheral  Gram Stain (13 Jan 2024 23:35):    Growth in aerobic bottle: Gram Positive Cocci in Clusters  Final Report (14 Jan 2024 21:03):    Growth in aerobic bottle: Staphylococcus epidermidis    Isolation of Coagulase negative Staphylococcus from single blood culture    sets may represent    contamination. Contact the Microbiology Department at [phone] if    susceptibility testing is    clinically indicated.    Culture - Blood (collected 13 Jan 2024 00:30)  Source: .Blood Blood-Peripheral  Gram Stain (13 Jan 2024 20:50):    Growth in aerobic bottle: Gram Positive Cocci in Clusters  Final Report (15 Alan 2024 17:47):    Growth in aerobic bottle: Staphylococcus hominis "Susceptibilities not    performed"    Growth in aerobic bottle: Staphylococcus epidermidis    Direct identification is available within approximately 3-5    hours either by Blood Panel Multiplexed PCR or Direct    MALDI-TOF. Details: [email or web address]  Organism: Blood Culture PCR  Staphylococcus epidermidis (15 Alan 2024 17:47)  Organism: Staphylococcus epidermidis (15 Alan 2024 17:47)      Method Type: MARIA M      -  Ampicillin/Sulbactam: R <=8/4      -  Cefazolin: R <=4      -  Clindamycin: S <=0.25      -  Erythromycin: S <=0.25      -  Gentamicin: S <=1 Should not be used as monotherapy      -  Oxacillin: R >2      -  Penicillin: R 0.5      -  Rifampin: S <=1 Should not be used as monotherapy      -  Tetracycline: S <=1      -  Trimethoprim/Sulfamethoxazole: R >2/38      -  Vancomycin: S 1  Organism: Blood Culture PCR (15 Alan 2024 17:47)      Method Type: PCR      -  Staphylococcus epidermidis, Methicillin resistant: Detec              Rapid RVP Result: Detected (01-13 @ 01:48)        RADIOLOGY:  Imaging reviewed

## 2024-01-16 NOTE — PROGRESS NOTE ADULT - ASSESSMENT
Assessment and Recommendation:   Acute hypoxic respiratory Failure on 02 on top of chronic   keep 02 saturation > 87%  Acute COPD exacerbation  H/O graves disease   S/P left nephrectomy for renal cell carcinoma   Morbid obese and NOEMY   bilateral chronic lymphedema  S/P cervical laminectomy and cage placement   wheel chair bound and home bound   chronic pain syndrome   solumedrol 30 mg Q 8 hrs   Duoneb q 6 hrs   continue Rocephin one gram Q 24 hrs   DVT and GI prophylaxis   spend 40 minutes on consultation and discussion with PCP > 50% counselling the patient

## 2024-01-16 NOTE — PHYSICAL THERAPY INITIAL EVALUATION ADULT - PERTINENT HX OF CURRENT PROBLEM, REHAB EVAL
73-year-old F with PMH COPD on 2L NC at home, hypothyroid, breast CA, asthma, chronic lymphedema, wheelchair bound, obesity. Pt presented with difficulty breathing, + cough and difficulty breathing for the past couple of days. Pt found by EMS to be hypoxic to 68% on 2 L nasal cannula. Pt placed on CPAP by EMS, which improved oxygen sat. Admitted to [facility] with SOB, found to have +human metapneumovirus and acute COPD exacerbation. Now on 3L supplemental O2 via NC. 73-year-old F with PMH COPD on 2L NC at home, hypothyroid, breast CA, asthma, chronic lymphedema, wheelchair bound, obesity. Pt presented with difficulty breathing, + cough and difficulty breathing for the past couple of days. Pt found by EMS to be hypoxic to 68% on 2 L nasal cannula. Pt placed on CPAP by EMS, which improved oxygen sat. Admitted to [facility] with SOB, found to have +human metapneumovirus and acute COPD exacerbation. Now on 3L supplemental O2 via NC.

## 2024-01-16 NOTE — PHYSICAL THERAPY INITIAL EVALUATION ADULT - RANGE OF MOTION EXAMINATION, REHAB EVAL
except LE knee/hip flexion limited 2/2 chronic lymphedema/bilateral upper extremity ROM was WFL (within functional limits)/bilateral lower extremity ROM was WFL (within functional limits)

## 2024-01-17 RX ORDER — CEFTRIAXONE 500 MG/1
1000 INJECTION, POWDER, FOR SOLUTION INTRAMUSCULAR; INTRAVENOUS EVERY 24 HOURS
Refills: 0 | Status: COMPLETED | OUTPATIENT
Start: 2024-01-18 | End: 2024-01-20

## 2024-01-17 RX ADMIN — Medication 1 APPLICATION(S): at 18:43

## 2024-01-17 RX ADMIN — GABAPENTIN 100 MILLIGRAM(S): 400 CAPSULE ORAL at 18:44

## 2024-01-17 RX ADMIN — CEFTRIAXONE 100 MILLIGRAM(S): 500 INJECTION, POWDER, FOR SOLUTION INTRAMUSCULAR; INTRAVENOUS at 00:55

## 2024-01-17 RX ADMIN — Medication 3 MILLILITER(S): at 23:26

## 2024-01-17 RX ADMIN — CEFTRIAXONE 100 MILLIGRAM(S): 500 INJECTION, POWDER, FOR SOLUTION INTRAMUSCULAR; INTRAVENOUS at 23:25

## 2024-01-17 RX ADMIN — Medication 3 MILLILITER(S): at 05:18

## 2024-01-17 RX ADMIN — DULOXETINE HYDROCHLORIDE 60 MILLIGRAM(S): 30 CAPSULE, DELAYED RELEASE ORAL at 12:16

## 2024-01-17 RX ADMIN — Medication 20 MILLIGRAM(S): at 22:09

## 2024-01-17 RX ADMIN — Medication 1 APPLICATION(S): at 05:17

## 2024-01-17 RX ADMIN — LOSARTAN POTASSIUM 100 MILLIGRAM(S): 100 TABLET, FILM COATED ORAL at 05:18

## 2024-01-17 RX ADMIN — Medication 600 MILLIGRAM(S): at 18:44

## 2024-01-17 RX ADMIN — HEPARIN SODIUM 5000 UNIT(S): 5000 INJECTION INTRAVENOUS; SUBCUTANEOUS at 05:17

## 2024-01-17 RX ADMIN — Medication 40 MILLIGRAM(S): at 05:18

## 2024-01-17 RX ADMIN — Medication 20 MILLIGRAM(S): at 12:16

## 2024-01-17 RX ADMIN — Medication 20 MILLIGRAM(S): at 05:17

## 2024-01-17 RX ADMIN — OLANZAPINE 10 MILLIGRAM(S): 15 TABLET, FILM COATED ORAL at 22:09

## 2024-01-17 RX ADMIN — Medication 3 MILLILITER(S): at 18:43

## 2024-01-17 RX ADMIN — Medication 81 MILLIGRAM(S): at 12:16

## 2024-01-17 RX ADMIN — Medication 3 MILLILITER(S): at 12:15

## 2024-01-17 RX ADMIN — Medication 50 MILLIGRAM(S): at 22:09

## 2024-01-17 RX ADMIN — Medication 125 MICROGRAM(S): at 05:19

## 2024-01-17 RX ADMIN — HEPARIN SODIUM 5000 UNIT(S): 5000 INJECTION INTRAVENOUS; SUBCUTANEOUS at 18:44

## 2024-01-17 RX ADMIN — Medication 3 MILLILITER(S): at 00:07

## 2024-01-17 RX ADMIN — GABAPENTIN 100 MILLIGRAM(S): 400 CAPSULE ORAL at 05:18

## 2024-01-17 RX ADMIN — MONTELUKAST 10 MILLIGRAM(S): 4 TABLET, CHEWABLE ORAL at 12:16

## 2024-01-17 RX ADMIN — ATORVASTATIN CALCIUM 40 MILLIGRAM(S): 80 TABLET, FILM COATED ORAL at 22:09

## 2024-01-17 RX ADMIN — Medication 50 MILLIGRAM(S): at 13:00

## 2024-01-17 RX ADMIN — Medication 20 MILLIGRAM(S): at 13:10

## 2024-01-17 RX ADMIN — Medication 600 MILLIGRAM(S): at 05:19

## 2024-01-17 RX ADMIN — Medication 50 MILLIGRAM(S): at 05:18

## 2024-01-17 NOTE — PROGRESS NOTE ADULT - SUBJECTIVE AND OBJECTIVE BOX
Cardiovascular Disease Progress Note    Overnight events: No acute events overnight.  no new cardiac sx  Otherwise review of systems negative    Objective Findings:  T(C): 36.6 (24 @ 04:00), Max: 36.9 (24 @ 20:51)  HR: 79 (24 @ 04:00) (65 - 85)  BP: 133/73 (24 @ 04:00) (133/73 - 179/90)  RR: 18 (24 @ 04:00) (18 - 18)  SpO2: 95% (24 @ 04:00) (92% - 96%)  Wt(kg): --  Daily     Daily Weight in k.6 (2024 06:04)      Physical Exam:  Gen: NAD  HEENT: EOMI  CV: RRR, normal S1 + S2, no m/r/g  Lungs: CTAB  Abd: soft, non-tender  Ext: No edema    Telemetry:    Laboratory Data:                        12.7   9.72  )-----------( 206      ( 2024 07:37 )             40.3     -    140  |  97  |  20  ----------------------------<  159<H>  3.9   |  32<H>  |  0.64    Ca    9.8      2024 07:40                Inpatient Medications:  MEDICATIONS  (STANDING):  albuterol    90 MICROgram(s) HFA Inhaler 2 Puff(s) Inhalation every 6 hours  albuterol/ipratropium for Nebulization 3 milliLiter(s) Nebulizer every 6 hours  aspirin enteric coated 81 milliGRAM(s) Oral daily  atorvastatin 40 milliGRAM(s) Oral at bedtime  busPIRone 20 milliGRAM(s) Oral <User Schedule>  cefTRIAXone   IVPB 2000 milliGRAM(s) IV Intermittent every 24 hours  cefTRIAXone   IVPB      clotrimazole 1% Cream 1 Application(s) Topical every 12 hours  DULoxetine 60 milliGRAM(s) Oral daily  furosemide    Tablet 40 milliGRAM(s) Oral daily  gabapentin 100 milliGRAM(s) Oral two times a day  guaiFENesin  milliGRAM(s) Oral every 12 hours  heparin   Injectable 5000 Unit(s) SubCutaneous every 12 hours  hydrALAZINE 50 milliGRAM(s) Oral three times a day  influenza  Vaccine (HIGH DOSE) 0.7 milliLiter(s) IntraMuscular once  levothyroxine 125 MICROGram(s) Oral daily  losartan 100 milliGRAM(s) Oral daily  methylPREDNISolone sodium succinate Injectable 20 milliGRAM(s) IV Push three times a day  montelukast 10 milliGRAM(s) Oral daily  OLANZapine 10 milliGRAM(s) Oral at bedtime  propranolol 20 milliGRAM(s) Oral two times a day  tiotropium 2.5 MICROgram(s) Inhaler 2 Puff(s) Inhalation daily      Assessment:  73-year-old history of COPD on 2L NC at home, hyperthyroid, breast CA, asthma, CHF, chronic lymphedema, wheelchair bound, presenting with difficulty breathing.  Patient has had cough and difficulty breathing for the past couple of days, today had significant trouble breathing, was found by EMS to be hypoxic to 68% on 2 L nasal cannula.  Patient placed on CPAP by EMS, which improved oxygen saturation.  Patient denies chest pain, syncope, fevers.    Recs:  cardiac stable  dyspnea likely in setting of hmpv and bacterial pneumonia  cw po lasix and ACE wraps to legs  pulmonary consulted, recs appreciated; steroids and bronchodilators per pulmonary  infectious workup and abx per ID  obtain echo (pos blood cultures)  supplemental o2, wean as able. resp status improving  dvt ppx          Over 25 minutes spent on total encounter; more than 50% of the visit was spent counseling and/or coordinating care by the attending physician.      Juan Salcedo MD   Cardiovascular Disease  [phone]

## 2024-01-17 NOTE — PROGRESS NOTE ADULT - SUBJECTIVE AND OBJECTIVE BOX
SHEKHAR VASQUEZ  MRN#: [id number]  Subjective:   pulmonary progress note  : SOB , Acute COPD  exacerbation , hypoxic in acute respiratory Failure , date of service is 2024   73-year-old          history of COPD on 2L NC at home, hyperthyroid, breast CA, asthma, CHF, chronic lymphedema, wheelchair bound,         presenting with difficulty breathing.     pt t has had cough and difficulty breathing for the past couple of days,  then had significant trouble breathing,      was found by EMS to be hypoxic to 68% on 2 L nasal cannula.     pt    was  placed on CPAP by EMS, which improved oxygen saturation., patient is less lethargic on BiPAP over night acceptable 02 saturation , no GI symptoms .no fever still with coughing , mild wheezing  , E coli  UTI, MRSA sepsis       pt  denies chest pain, syncope, fevers. (2024 09:52) on Antibiotics C/O pain and coughing , still   SOB  on excretion      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      History of carpal tunnel release  right wrist      History of parathyroidectomy      S/P laminectomy  now bed and wheelchair ridden with severe pain          FAMILY HISTORY:  Family history of myocardial infarction  mother  at age 67 and father at age 67 of MI's      OBJECTIVE:  ICU Vital Signs Last 24 Hrs  T(C): 36.9 (2024 11:28), Max: 37.2 (2024 13:58)  T(F): 98.4 (2024 11:28), Max: 99 (2024 13:58)  HR: 83 (2024 11:28) (78 - 93)  BP: 160/78 (2024 11:28) (160/78 - 185/101)  BP(mean): 110 (2024 16:50) (110 - 110)  ABP: --  ABP(mean): --  RR: 20 (2024 11:28) (19 - 20)  SpO2: 93% (2024 11:28) (93% - 99%)    O2 Parameters below as of 2024 11:28  Patient On (Oxygen Delivery Method): nasal cannula w/ humidification             @ 07:01  -   @ 13:53  --------------------------------------------------------  IN: 520 mL / OUT: 1200 mL / NET: -680 mL        PHYSICAL EXAM:Daily   morbidly obese female on 3 liter nasal 02 saturation is 93%  Daily   HEENT:     + NCAT  + EOMI  - Conjuctival edema   - Icterus   - Thrush   - ETT  - NGT/OGT  Neck:         + FROM    + JVD     - Nodes     - Masses    + Mid-line trachea   - Tracheostomy  Chest:       kyphotic deformities   Lungs:          + CTA   + Rhonchi    - Rales    + Wheezing     - Decreased BS   - Dullness R L  Cardiac:       + S1 + S2    + RRR   - Irregular   - S3  - S4    - Murmurs   - Rub   - Hamman’s sign   Abdomen:    + BS     + Soft    + Non-tender     - Distended    - Organomegaly  - PEG  Extremities:   - Cyanosis U/L   - Clubbing  U/L  + LE/UE Edema   + Capillary refill    + Pulses   Neuro:        + Awake   +  Alert   - Confused   - Lethargic   - Sedated   + Generalized Weakness  Skin:        - Rashes    - Erythema   + Normal incisions   + IV sites intact  - Sacral decubit      HOSPITAL MEDICATIONS: All mediciations reviewed and analyzed  MEDICATIONS  (STANDING):  albuterol    90 MICROgram(s) HFA Inhaler 2 Puff(s) Inhalation every 6 hours  albuterol/ipratropium for Nebulization 3 milliLiter(s) Nebulizer every 6 hours  aspirin enteric coated 81 milliGRAM(s) Oral daily  atorvastatin 40 milliGRAM(s) Oral at bedtime  busPIRone 20 milliGRAM(s) Oral <User Schedule>  cefTRIAXone   IVPB      DULoxetine 60 milliGRAM(s) Oral daily  furosemide   Injectable 40 milliGRAM(s) IV Push daily  gabapentin 100 milliGRAM(s) Oral two times a day  guaiFENesin  milliGRAM(s) Oral every 12 hours  heparin   Injectable 5000 Unit(s) SubCutaneous every 12 hours  hydrALAZINE 50 milliGRAM(s) Oral three times a day  influenza  Vaccine (HIGH DOSE) 0.7 milliLiter(s) IntraMuscular once  levothyroxine 125 MICROGram(s) Oral daily  losartan 100 milliGRAM(s) Oral daily  methylPREDNISolone sodium succinate Injectable 20 milliGRAM(s) IV Push three times a day  montelukast 10 milliGRAM(s) Oral daily  OLANZapine 10 milliGRAM(s) Oral at bedtime  propranolol 20 milliGRAM(s) Oral two times a day  tiotropium 2.5 MICROgram(s) Inhaler 2 Puff(s) Inhalation daily    MEDICATIONS  (PRN):    LABS: All Lab data reviewed and analyzed                        12.7   9.72  )-----------( 206      ( 2024 07:37 )             40.3    01-16    140  |  97  |  20  ----------------------------<  159<H>  3.9   |  32<H>  |  0.64    Ca    9.8      2024 07:40      Ca    9.8      2024 07:40                 40.3    Ca    9.9      2024 04:17      Ca    9.9      2024 04:17    TPro  6.9  /  Alb  3.7  /  TBili  1.0  /  DBili  x   /  AST  12  /  ALT  13  /  AlkPhos  104  01-13    PT/INR - ( 2024 01:21 )   PT: 12.2 sec;   INR: 1.17 ratio         PTT - ( 2024 01:21 )  PTT:74.2 sec LIVER FUNCTIONS - ( 2024 01:21 )  Alb: 3.7 g/dL / Pro: 6.9 g/dL / ALK PHOS: 104 U/L / ALT: 13 U/L / AST: 12 U/L / GGT: x           RADIOLOGY: - Reviewed and analyzed

## 2024-01-17 NOTE — PROGRESS NOTE ADULT - ASSESSMENT
Assessment and Recommendation:   Acute hypoxic respiratory Failure on 02 on top of chronic   keep 02 saturation > 87%  Acute COPD exacerbation  H/O graves disease   S/P left nephrectomy for renal cell carcinoma   Morbid obese and NOEMY   bilateral chronic lymphedema  S/P cervical laminectomy and cage placement   wheel chair bound and home bound   chronic pain syndrome   solumedrol 30 mg Q 12 hrs   Duoneb q 6 hrs   continue Rocephin one gram Q 24 hrs   DVT and GI prophylaxis   spend 40 minutes on consultation and discussion with PCP > 50% counselling the patient

## 2024-01-17 NOTE — PHARMACOTHERAPY INTERVENTION NOTE - COMMENTS
Confirmed home medications with patient and [facility] pharmacy, updated in Outpatient Medication Review.     Home Medications:  amLODIPine 5 mg oral tablet: 1 tab(s) orally once a day   aspirin 81 mg oral delayed release tablet: 1 tab(s) orally once a day   atorvastatin 40 mg oral tablet: 1 tab(s) orally once a day (at bedtime)   busPIRone 10 mg oral tablet: 2 tab(s) orally once a day at noon  DULoxetine 60 mg oral delayed release capsule: 1 cap(s) orally 2 times a day   furosemide 40 mg oral tablet: 1 tab(s) orally once a day   gabapentin 300 mg oral capsule: 1 cap(s) orally 2 times a day (noon & evening)  hydrALAZINE 25 mg oral tablet: 3 tab(s) orally 2 times a day   levothyroxine 125 mcg (0.125 mg) oral tablet: 1 tab(s) orally once a day   losartan 100 mg oral tablet: 1 tab(s) orally once a day   melatonin 3 mg oral tablet: 1 tab(s) orally once a day (at bedtime) ?  montelukast 10 mg oral tablet: 1 tab(s) orally once a day   OLANZapine 10 mg oral tablet: 1 tab(s) orally once a day (at bedtime)   penicillin V potassium 500 mg twice a day   predniSONE 5 mg oral tablet: 1 tab(s) orally once a day   propranolol 20 mg oral tablet: 1 tab(s) orally 2 times a day   Spiriva HandiHaler 18 mcg inhalation capsule: 1 cap(s) inhaled once a day     Fallon Escobar, PharmD, BCPS  Clinical Pharmacy Specialist  [phone] or Teams   
Patient is currently ordered for ceftriaxone 2g every 24 hours. Recommended changing to 1g every 24 hours x 4 more doses to complete therapy on 1/21 per ID note.    Also recommended discontinuing Spiriva (tiotropium) as patient is currently receiving albuterol/ipratropium around the clock.    Fallon Escobar, PharmD, [facility]  Clinical Pharmacy Specialist  [phone] or Teams

## 2024-01-17 NOTE — PROGRESS NOTE ADULT - ASSESSMENT
_________________________________________________________________________________________  ========>>  M E D I C A L   A T T E N D I N G    F O L L O W  U P  N O T E  <<=========  -----------------------------------------------------------------------------------------------------    - Patient seen and examined by me earlier today.   - In summary,  SHEKHAR VASQUEZ is a 73y year old woman admitted with SOB  - Patient today overall doing ok, comfortable, eating OK. + occasional cough  >>> overall improved , asking about going home    patient has home O2 2L    ==================>> REVIEW OF SYSTEM <<=================    GEN: no fever, no chills, overall doing ok   RESP: no SOB at rest , no cough, no sputum  CVS: no chest pain, no palpitations  GI: no abdominal pain, no nausea, no BM X 2 days : monitoring   : no dysuria, no frequency  Neuro: no headache, no dizziness    ==================>> PHYSICAL EXAM <<=================    GEN: A&O X 3 , NAD , comfortable, pleasant, calm   HEENT: NCAT, PERRL, MMM, hearing intact  CVS: S1S2 , regular , No M/R/G appreciated  PULM: CTA B/L,  limited exam due to body habitus.   ABD.: soft. non tender, non distended,  obese   Extrem: intact pulses , ++ chronic lymphedema changes >> to ACE wrap        ( Note written / Date of service 01-17-24 ( This is certified to be the same as "ENTERED" date above ( for billing purposes)))    ==================>> MEDICATIONS <<====================    albuterol    90 MICROgram(s) HFA Inhaler 2 Puff(s) Inhalation every 6 hours  albuterol/ipratropium for Nebulization 3 milliLiter(s) Nebulizer every 6 hours  aspirin enteric coated 81 milliGRAM(s) Oral daily  atorvastatin 40 milliGRAM(s) Oral at bedtime  busPIRone 20 milliGRAM(s) Oral <User Schedule>  clotrimazole 1% Cream 1 Application(s) Topical every 12 hours  DULoxetine 60 milliGRAM(s) Oral daily  furosemide    Tablet 40 milliGRAM(s) Oral daily  gabapentin 100 milliGRAM(s) Oral two times a day  guaiFENesin  milliGRAM(s) Oral every 12 hours  heparin   Injectable 5000 Unit(s) SubCutaneous every 12 hours  hydrALAZINE 50 milliGRAM(s) Oral three times a day  influenza  Vaccine (HIGH DOSE) 0.7 milliLiter(s) IntraMuscular once  levothyroxine 125 MICROGram(s) Oral daily  losartan 100 milliGRAM(s) Oral daily  methylPREDNISolone sodium succinate Injectable 20 milliGRAM(s) IV Push three times a day  montelukast 10 milliGRAM(s) Oral daily  OLANZapine 10 milliGRAM(s) Oral at bedtime  propranolol 20 milliGRAM(s) Oral two times a day    MEDICATIONS  (PRN):  acetaminophen     Tablet .. 650 milliGRAM(s) Oral every 6 hours PRN Temp greater or equal to 38C (100.4F), Mild Pain (1 - 3)    ___________  Active diet:  Diet, Regular  ___________________    ==================>> VITAL SIGNS <<==================    Weight (kg): 112.7  Vital Signs Last 24 HrsT(C): 36.6 (01-17-24 @ 04:00)  T(F): 97.9 (01-17-24 @ 04:00), Max: 98.5 (01-16-24 @ 20:51)  HR: 79 (01-17-24 @ 04:00) (65 - 85)  BP: 133/73 (01-17-24 @ 04:00)  RR: 18 (01-17-24 @ 04:00) (18 - 18)  SpO2: 95% (01-17-24 @ 04:00) (94% - 96%)       ==================>> LAB AND IMAGING <<==================                        12.7   9.72  )-----------( 206      ( 16 Jan 2024 07:37 )             40.3        01-16    140  |  97  |  20  ----------------------------<  159<H>  3.9   |  32<H>  |  0.64    Ca    9.8      16 Jan 2024 07:40      WBC count:   9.72 <<== ,  9.18 <<== ,  9.76 <<==   Hemoglobin:   12.7 <<==,  12.9 <<==,  12.0 <<==  platelets:  206 <==, 188 <==, 153 <==    Creatinine:  0.64  <<==, 0.54  <<==, 0.67  <<==  Sodium:   140  <==, 141  <==, 137  <==       AST:          12(01-13) <==      ALT:        13(01-13)  <==      AP:        104(01-13)  <=     Bili:        1.0(01-13)  <=    ____________________________    M I C R O B I O L O G Y :    Culture - Blood (collected 14 Jan 2024 04:46)  Source: .Blood Blood-Peripheral  Preliminary Report (17 Jan 2024 09:01):    No growth at 72 Hours    Culture - Blood (collected 14 Jan 2024 04:46)  Source: .Blood Blood-Peripheral  Preliminary Report (17 Jan 2024 09:01):    No growth at 72 Hours    Culture - Urine (collected 13 Jan 2024 02:08)  Source: Clean Catch Clean Catch (Midstream)  Final Report (16 Jan 2024 16:21):    >100,000 CFU/ml Escherichia coli ESBL  Organism: Escherichia coli ESBL (16 Jan 2024 16:21)  Organism: Escherichia coli ESBL (16 Jan 2024 16:21)    Sensitivities:      Method Type: MARIA M      -  Amoxicillin/Clavulanic Acid: S <=8/4      -  Ampicillin: R >16 These ampicillin results predict results for amoxicillin      -  Ampicillin/Sulbactam: I 16/8      -  Aztreonam: R 16      -  Cefazolin: R >16 For uncomplicated UTI with K. pneumoniae, E. coli, or P. mirablis: MARIA M <=16 is sensitive and MARIA M >=32 is resistant. This also predicts results for oral agents cefaclor, cefdinir, cefpodoxime, cefprozil, cefuroxime axetil, cephalexin and locarbef for uncomplicated UTI. Note that some isolates may be susceptible to these agents while testing resistant to cefazolin.      -  Cefepime: R 8      -  Ceftriaxone: R >32      -  Cefuroxime: R >16      -  Ciprofloxacin: R >2      -  Ertapenem: S <=0.5      -  Gentamicin: S <=2      -  Imipenem: S <=1      -  Levofloxacin: R >4      -  Meropenem: S <=1      -  Nitrofurantoin: S <=32 Should not be used to treat pyelonephritis      -  Piperacillin/Tazobactam: S <=8      -  Tobramycin: S <=2      -  Trimethoprim/Sulfamethoxazole: R >2/38    Culture - Blood (collected 13 Jan 2024 00:45)  Source: .Blood Blood-Peripheral  Gram Stain (13 Jan 2024 23:35):    Growth in aerobic bottle: Gram Positive Cocci in Clusters  Final Report (14 Jan 2024 21:03):    Growth in aerobic bottle: Staphylococcus epidermidis    Isolation of Coagulase negative Staphylococcus from single blood culture    sets may represent    contamination. Contact the Microbiology Department at [phone] if    susceptibility testing is    clinically indicated.    Culture - Blood (collected 13 Jan 2024 00:30)  Source: .Blood Blood-Peripheral  Gram Stain (13 Jan 2024 20:50):    Growth in aerobic bottle: Gram Positive Cocci in Clusters  Final Report (15 Alan 2024 17:47):    Growth in aerobic bottle: Staphylococcus hominis "Susceptibilities not    performed"    Growth in aerobic bottle: Staphylococcus epidermidis    Direct identification is available within approximately 3-5    hours either by Blood Panel Multiplexed PCR or Direct    MALDI-TOF. Details: [email or web address]  Organism: Blood Culture PCR  Staphylococcus epidermidis (15 Alan 2024 17:47)  Organism: Staphylococcus epidermidis (15 Alan 2024 17:47)    Sensitivities:      Method Type: MARIA M      -  Ampicillin/Sulbactam: R <=8/4      -  Cefazolin: R <=4      -  Clindamycin: S <=0.25      -  Erythromycin: S <=0.25      -  Gentamicin: S <=1 Should not be used as monotherapy      -  Oxacillin: R >2      -  Penicillin: R 0.5      -  Rifampin: S <=1 Should not be used as monotherapy      -  Tetracycline: S <=1      -  Trimethoprim/Sulfamethoxazole: R >2/38      -  Vancomycin: S 1  Organism: Blood Culture PCR (15 Alan 2024 17:47)    Sensitivities:      Method Type: PCR      -  Staphylococcus epidermidis, Methicillin resistant: Detec    SARS-CoV-2: NotDetec (01-13-24 @ 01:48)    ECHO pending    LE Duplex noted, no DVT on visualized veins     ___________________________________________________________________________________  ===============>>  A S S E S S M E N T   A N D   P L A N <<===============  ------------------------------------------------------------------------------------------    · Assessment	   73-year-old woman with h/o  COPD on 2L NC at home, hypothyroid,  breast CA, asthma, chronic lymphedema, wheelchair bound, obesity       presented with difficulty breathing./   had cough and difficulty breathing for the past couple of days,        then had significant trouble breathing, . had  worsened     was found by EMS to be hypoxic to 68% on 2 L nasal cannula.  /  was  placed on CPAP by EMS, which improved oxygen sat/ /  denies chest pain, syncope, fevers.      admitted wth sob, + HMPV    acute  copd  exacerbation.  on home oxygen        on  steroid /  Proventil   spiriva         CT showing pneumonia         MRSA staph epi Bacteremia > ? contamination  >>> ID appreciated          UTI on abx  Pulm and ID following, appreciated  on antibiotics for possible pneumonia / CAP      h/o   acute on  c/c diastolic  chf    on iv lasix, per  card       HTN.  HLD    Continue Current medications otherwise and monitor.    adjust medications as needed per cardio     h/o esophageal   dysmotility   c/c  lymphedema       continue current management    PT / OOB as able ( patient at baseline not very active)     hope for DC planing home in next couple of days... !     pending Echo    --------------------------------------------  Case discussed with patient,   Education given on findings and plan of care  ___________________________  DIANE Welch D.O.  Pager: [phone]

## 2024-01-18 PROCEDURE — 93306 TTE W/DOPPLER COMPLETE: CPT | Mod: 26

## 2024-01-18 RX ADMIN — Medication 3 MILLILITER(S): at 12:34

## 2024-01-18 RX ADMIN — Medication 125 MICROGRAM(S): at 05:46

## 2024-01-18 RX ADMIN — Medication 50 MILLIGRAM(S): at 21:10

## 2024-01-18 RX ADMIN — Medication 81 MILLIGRAM(S): at 12:34

## 2024-01-18 RX ADMIN — DULOXETINE HYDROCHLORIDE 60 MILLIGRAM(S): 30 CAPSULE, DELAYED RELEASE ORAL at 12:35

## 2024-01-18 RX ADMIN — GABAPENTIN 100 MILLIGRAM(S): 400 CAPSULE ORAL at 17:10

## 2024-01-18 RX ADMIN — Medication 600 MILLIGRAM(S): at 05:47

## 2024-01-18 RX ADMIN — Medication 20 MILLIGRAM(S): at 05:46

## 2024-01-18 RX ADMIN — MONTELUKAST 10 MILLIGRAM(S): 4 TABLET, CHEWABLE ORAL at 12:35

## 2024-01-18 RX ADMIN — Medication 600 MILLIGRAM(S): at 17:11

## 2024-01-18 RX ADMIN — OLANZAPINE 10 MILLIGRAM(S): 15 TABLET, FILM COATED ORAL at 21:11

## 2024-01-18 RX ADMIN — Medication 1 APPLICATION(S): at 05:46

## 2024-01-18 RX ADMIN — ATORVASTATIN CALCIUM 40 MILLIGRAM(S): 80 TABLET, FILM COATED ORAL at 21:10

## 2024-01-18 RX ADMIN — Medication 20 MILLIGRAM(S): at 14:09

## 2024-01-18 RX ADMIN — HEPARIN SODIUM 5000 UNIT(S): 5000 INJECTION INTRAVENOUS; SUBCUTANEOUS at 17:11

## 2024-01-18 RX ADMIN — Medication 3 MILLILITER(S): at 21:11

## 2024-01-18 RX ADMIN — Medication 40 MILLIGRAM(S): at 05:47

## 2024-01-18 RX ADMIN — Medication 50 MILLIGRAM(S): at 05:47

## 2024-01-18 RX ADMIN — Medication 3 MILLILITER(S): at 05:46

## 2024-01-18 RX ADMIN — Medication 20 MILLIGRAM(S): at 12:34

## 2024-01-18 RX ADMIN — Medication 3 MILLILITER(S): at 17:10

## 2024-01-18 RX ADMIN — Medication 50 MILLIGRAM(S): at 14:09

## 2024-01-18 RX ADMIN — LOSARTAN POTASSIUM 100 MILLIGRAM(S): 100 TABLET, FILM COATED ORAL at 05:47

## 2024-01-18 RX ADMIN — Medication 1 APPLICATION(S): at 17:11

## 2024-01-18 RX ADMIN — HEPARIN SODIUM 5000 UNIT(S): 5000 INJECTION INTRAVENOUS; SUBCUTANEOUS at 05:47

## 2024-01-18 RX ADMIN — GABAPENTIN 100 MILLIGRAM(S): 400 CAPSULE ORAL at 05:46

## 2024-01-18 NOTE — PROGRESS NOTE ADULT - ASSESSMENT
Assessment and Recommendation:   Acute hypoxic respiratory Failure on 02 on top of chronic   keep 02 saturation > 87%  Acute COPD exacerbation  H/O graves disease   S/P left nephrectomy for renal cell carcinoma   Morbid obese and NOEMY   bilateral chronic lymphedema  S/P cervical laminectomy and cage placement   wheel chair bound and home bound   chronic pain syndrome   solumedrol 20 mg Q 12 hrs   Duoneb q 6 hrs   continue Rocephin one gram Q 24 hrs   DVT and GI prophylaxis   spend 40 minutes on consultation and discussion with PCP > 50% counselling the patient

## 2024-01-18 NOTE — PROGRESS NOTE ADULT - SUBJECTIVE AND OBJECTIVE BOX
SHEKHAR VASQUEZ  MRN#: [id number]  Subjective:   pulmonary progress note  : SOB , Acute COPD  exacerbation , hypoxic in acute respiratory Failure , date of service is 2024   73-year-old          history of COPD on 2L NC at home, hyperthyroid, breast CA, asthma, CHF, chronic lymphedema, wheelchair bound,         presenting with difficulty breathing.     pt t has had cough and difficulty breathing for the past couple of days,  then had significant trouble breathing,      was found by EMS to be hypoxic to 68% on 2 L nasal cannula.     pt    was  placed on CPAP by EMS, which improved oxygen saturation., patient is less lethargic on BiPAP over night acceptable 02 saturation , no GI symptoms .no fever still with coughing , mild wheezing  , E coli  UTI, MRSA sepsis       pt  denies chest pain, syncope, fevers. (2024 09:52) on Antibiotics for bacterial pneumonia  C/O pain and coughing , still   SOB  on excretion  , on 2 liter nasal 02 saturation is 95%     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      History of carpal tunnel release  right wrist      History of parathyroidectomy      S/P laminectomy  now bed and wheelchair ridden with severe pain          FAMILY HISTORY:  Family history of myocardial infarction  mother  at age 67 and father at age 67 of MI's      OBJECTIVE:  ICU Vital Signs Last 24 Hrs  T(C): 36.9 (2024 11:28), Max: 37.2 (2024 13:58)  T(F): 98.4 (2024 11:28), Max: 99 (2024 13:58)  HR: 83 (2024 11:28) (78 - 93)  BP: 160/78 (2024 11:28) (160/78 - 185/101)  BP(mean): 110 (2024 16:50) (110 - 110)  ABP: --  ABP(mean): --  RR: 20 (2024 11:28) (19 - 20)  SpO2: 93% (2024 11:28) (93% - 99%)    O2 Parameters below as of 2024 11:28  Patient On (Oxygen Delivery Method): nasal cannula w/ humidification             @ 07:01  -   @ 13:53  --------------------------------------------------------  IN: 520 mL / OUT: 1200 mL / NET: -680 mL        PHYSICAL EXAM:Daily   morbidly obese female on 3 liter nasal 02 saturation is 95%  Daily   HEENT:     + NCAT  + EOMI  - Conjuctival edema   - Icterus   - Thrush   - ETT  - NGT/OGT  Neck:         + FROM    + JVD     - Nodes     - Masses    + Mid-line trachea   - Tracheostomy  Chest:       kyphotic deformities   Lungs:          + CTA   + Rhonchi    - Rales    + Wheezing     - Decreased BS   - Dullness R L  Cardiac:       + S1 + S2    + RRR   - Irregular   - S3  - S4    - Murmurs   - Rub   - Hamman’s sign   Abdomen:    + BS     + Soft    + Non-tender     - Distended    - Organomegaly  - PEG  Extremities:   - Cyanosis U/L   - Clubbing  U/L  + LE/UE Edema   + Capillary refill    + Pulses   Neuro:        + Awake   +  Alert   - Confused   - Lethargic   - Sedated   + Generalized Weakness  Skin:        - Rashes    - Erythema   + Normal incisions   + IV sites intact  - Sacral decubit      HOSPITAL MEDICATIONS: All mediciations reviewed and analyzed  MEDICATIONS  (STANDING):  albuterol    90 MICROgram(s) HFA Inhaler 2 Puff(s) Inhalation every 6 hours  albuterol/ipratropium for Nebulization 3 milliLiter(s) Nebulizer every 6 hours  aspirin enteric coated 81 milliGRAM(s) Oral daily  atorvastatin 40 milliGRAM(s) Oral at bedtime  busPIRone 20 milliGRAM(s) Oral <User Schedule>  cefTRIAXone   IVPB      DULoxetine 60 milliGRAM(s) Oral daily  furosemide   Injectable 40 milliGRAM(s) IV Push daily  gabapentin 100 milliGRAM(s) Oral two times a day  guaiFENesin  milliGRAM(s) Oral every 12 hours  heparin   Injectable 5000 Unit(s) SubCutaneous every 12 hours  hydrALAZINE 50 milliGRAM(s) Oral three times a day  influenza  Vaccine (HIGH DOSE) 0.7 milliLiter(s) IntraMuscular once  levothyroxine 125 MICROGram(s) Oral daily  losartan 100 milliGRAM(s) Oral daily  methylPREDNISolone sodium succinate Injectable 20 milliGRAM(s) IV Push three times a day  montelukast 10 milliGRAM(s) Oral daily  OLANZapine 10 milliGRAM(s) Oral at bedtime  propranolol 20 milliGRAM(s) Oral two times a day  tiotropium 2.5 MICROgram(s) Inhaler 2 Puff(s) Inhalation daily    MEDICATIONS  (PRN):    LABS: All Lab data reviewed and analyzed                        12.7   9.72  )-----------( 206      ( 2024 07:37 )             40.3    01-16    140  |  97  |  20  ----------------------------<  159<H>  3.9   |  32<H>  |  0.64    Ca    9.8      2024 07:40      Ca    9.8      2024 07:40                 40.3    Ca    9.9      2024 04:17      Ca    9.9      2024 04:17    TPro  6.9  /  Alb  3.7  /  TBili  1.0  /  DBili  x   /  AST  12  /  ALT  13  /  AlkPhos  104  01-13    PT/INR - ( 2024 01:21 )   PT: 12.2 sec;   INR: 1.17 ratio         PTT - ( 2024 01:21 )  PTT:74.2 sec LIVER FUNCTIONS - ( 2024 01:21 )  Alb: 3.7 g/dL / Pro: 6.9 g/dL / ALK PHOS: 104 U/L / ALT: 13 U/L / AST: 12 U/L / GGT: x           RADIOLOGY: - Reviewed and analyzed

## 2024-01-18 NOTE — PROGRESS NOTE ADULT - SUBJECTIVE AND OBJECTIVE BOX
Cardiovascular Disease Progress Note    Overnight events: No acute events overnight.  no new cardiac sx  Otherwise review of systems negative    Objective Findings:  T(C): 36.3 (01-18-24 @ 04:15), Max: 36.7 (01-17-24 @ 12:04)  HR: 85 (01-18-24 @ 07:10) (63 - 85)  BP: 182/85 (01-18-24 @ 07:10) (129/91 - 194/83)  RR: 18 (01-18-24 @ 04:15) (18 - 18)  SpO2: 94% (01-18-24 @ 04:15) (91% - 94%)  Wt(kg): --  Daily     Daily       Physical Exam:  Gen: NAD  HEENT: EOMI  CV: RRR, normal S1 + S2, no m/r/g  Lungs: CTAB  Abd: soft, non-tender  Ext: No edema    Telemetry:    Laboratory Data:    01-16    140  |  97  |  20  ----------------------------<  159<H>  3.9   |  32<H>  |  0.64    Ca    9.8      16 Jan 2024 07:40                Inpatient Medications:  MEDICATIONS  (STANDING):  albuterol    90 MICROgram(s) HFA Inhaler 2 Puff(s) Inhalation every 6 hours  albuterol/ipratropium for Nebulization 3 milliLiter(s) Nebulizer every 6 hours  aspirin enteric coated 81 milliGRAM(s) Oral daily  atorvastatin 40 milliGRAM(s) Oral at bedtime  busPIRone 20 milliGRAM(s) Oral <User Schedule>  cefTRIAXone   IVPB 1000 milliGRAM(s) IV Intermittent every 24 hours  clotrimazole 1% Cream 1 Application(s) Topical every 12 hours  DULoxetine 60 milliGRAM(s) Oral daily  furosemide    Tablet 40 milliGRAM(s) Oral daily  gabapentin 100 milliGRAM(s) Oral two times a day  guaiFENesin  milliGRAM(s) Oral every 12 hours  heparin   Injectable 5000 Unit(s) SubCutaneous every 12 hours  hydrALAZINE 50 milliGRAM(s) Oral three times a day  influenza  Vaccine (HIGH DOSE) 0.7 milliLiter(s) IntraMuscular once  levothyroxine 125 MICROGram(s) Oral daily  losartan 100 milliGRAM(s) Oral daily  methylPREDNISolone sodium succinate Injectable 20 milliGRAM(s) IV Push three times a day  montelukast 10 milliGRAM(s) Oral daily  OLANZapine 10 milliGRAM(s) Oral at bedtime  propranolol 20 milliGRAM(s) Oral two times a day      Assessment:  73-year-old history of COPD on 2L NC at home, hyperthyroid, breast CA, asthma, CHF, chronic lymphedema, wheelchair bound, presenting with difficulty breathing.  Patient has had cough and difficulty breathing for the past couple of days, today had significant trouble breathing, was found by EMS to be hypoxic to 68% on 2 L nasal cannula.  Patient placed on CPAP by EMS, which improved oxygen saturation.  Patient denies chest pain, syncope, fevers.    Recs:  cardiac stable  dyspnea likely in setting of hmpv and bacterial pneumonia  cw po lasix and ACE wraps to legs  pulmonary consulted, recs appreciated; steroids and bronchodilators per pulmonary  abx per ID  pos bcx, likely contaminant per ID  supplemental o2, wean as able. resp status improving  dvt ppx        Over 25 minutes spent on total encounter; more than 50% of the visit was spent counseling and/or coordinating care by the attending physician.      Juan Salcedo MD   Cardiovascular Disease  [phone]

## 2024-01-18 NOTE — PROGRESS NOTE ADULT - ASSESSMENT
_________________________________________________________________________________________  ========>>  M E D I C A L   A T T E N D I N G    F O L L O W  U P  N O T E  <<=========  -----------------------------------------------------------------------------------------------------    - Patient seen and examined by me earlier today.   - In summary,  SHEKHAR VASQUEZ is a 73y year old woman admitted with SOB  - Patient today overall doing ok, comfortable, eating OK. + occasional cough  >>> overall improved , asking about going home    patient has home O2 2L    ==================>> REVIEW OF SYSTEM <<=================    GEN: no fever, no chills, overall doing ok   RESP: no SOB at rest , no cough, no sputum  CVS: no chest pain, no palpitations  GI: no abdominal pain, no nausea, no BM X 2 days : monitoring   : no dysuria, no frequency  Neuro: no headache, no dizziness    ==================>> PHYSICAL EXAM <<=================    GEN: A&O X 3 , NAD , comfortable, pleasant, calm   HEENT: NCAT, PERRL, MMM, hearing intact  CVS: S1S2 , regular , No M/R/G appreciated  PULM: CTA B/L,  limited exam due to body habitus.   ABD.: soft. non tender, non distended,  obese   Extrem: intact pulses , ++ chronic lymphedema changes >> to ACE wrap         ( Note written / Date of service 01-18-24 ( This is certified to be the same as "ENTERED" date above ( for billing purposes)))    ==================>> MEDICATIONS <<====================    albuterol    90 MICROgram(s) HFA Inhaler 2 Puff(s) Inhalation every 6 hours  albuterol/ipratropium for Nebulization 3 milliLiter(s) Nebulizer every 6 hours  aspirin enteric coated 81 milliGRAM(s) Oral daily  atorvastatin 40 milliGRAM(s) Oral at bedtime  busPIRone 20 milliGRAM(s) Oral <User Schedule>  cefTRIAXone   IVPB 1000 milliGRAM(s) IV Intermittent every 24 hours  clotrimazole 1% Cream 1 Application(s) Topical every 12 hours  DULoxetine 60 milliGRAM(s) Oral daily  furosemide    Tablet 40 milliGRAM(s) Oral daily  gabapentin 100 milliGRAM(s) Oral two times a day  guaiFENesin  milliGRAM(s) Oral every 12 hours  heparin   Injectable 5000 Unit(s) SubCutaneous every 12 hours  hydrALAZINE 50 milliGRAM(s) Oral three times a day  influenza  Vaccine (HIGH DOSE) 0.7 milliLiter(s) IntraMuscular once  levothyroxine 125 MICROGram(s) Oral daily  losartan 100 milliGRAM(s) Oral daily  montelukast 10 milliGRAM(s) Oral daily  OLANZapine 10 milliGRAM(s) Oral at bedtime  propranolol 20 milliGRAM(s) Oral two times a day    MEDICATIONS  (PRN):  acetaminophen     Tablet .. 650 milliGRAM(s) Oral every 6 hours PRN Temp greater or equal to 38C (100.4F), Mild Pain (1 - 3)    ___________  Active diet:  Diet, Regular  ___________________    ==================>> VITAL SIGNS <<==================    Vital Signs Last 24 HrsT(C): 36.4 (01-18-24 @ 11:06)  T(F): 97.6 (01-18-24 @ 11:06), Max: 98.1 (01-17-24 @ 21:56)  HR: 80 (01-18-24 @ 11:06) (63 - 85)  BP: 120/72 (01-18-24 @ 11:06)  RR: 18 (01-18-24 @ 11:06) (18 - 18)  SpO2: 93% (01-18-24 @ 11:06) (91% - 94%)      CAPILLARY BLOOD GLUCOSE         ==================>> LAB AND IMAGING <<==================             WBC count:   9.72 <<== ,  9.18 <<==   Hemoglobin:   12.7 <<==,  12.9 <<==  platelets:  206 <==, 188 <==, 153 <==    Creatinine:  0.64  <<==, 0.54  <<==, 0.67  <<==  Sodium:   140  <==, 141  <==, 137  <==       AST:          12(01-13) <==      ALT:        13(01-13)  <==      AP:        104(01-13)  <=     Bili:        1.0(01-13)  <=    ____________________________    M I C R O B I O L O G Y :    Culture - Blood (collected 14 Jan 2024 04:46)  Source: .Blood Blood-Peripheral  Preliminary Report (18 Jan 2024 09:01):    No growth at 4 days    Culture - Blood (collected 14 Jan 2024 04:46)  Source: .Blood Blood-Peripheral  Preliminary Report (18 Jan 2024 09:01):    No growth at 4 days    Culture - Urine (collected 13 Jan 2024 02:08)  Source: Clean Catch Clean Catch (Midstream)  Final Report (16 Jan 2024 16:21):    >100,000 CFU/ml Escherichia coli ESBL  Organism: Escherichia coli ESBL (16 Jan 2024 16:21)  Organism: Escherichia coli ESBL (16 Jan 2024 16:21)    Sensitivities:      -  Levofloxacin: R >4      -  Tobramycin: S <=2      -  Nitrofurantoin: S <=32 Should not be used to treat pyelonephritis      -  Aztreonam: R 16      -  Gentamicin: S <=2      -  Cefazolin: R >16 For uncomplicated UTI with K. pneumoniae, E. coli, or P. mirablis: MARIA M <=16 is sensitive and MARIA M >=32 is resistant. This also predicts results for oral agents cefaclor, cefdinir, cefpodoxime, cefprozil, cefuroxime axetil, cephalexin and locarbef for uncomplicated UTI. Note that some isolates may be susceptible to these agents while testing resistant to cefazolin.      -  Cefepime: R 8      -  Piperacillin/Tazobactam: S <=8      -  Ciprofloxacin: R >2      -  Imipenem: S <=1      -  Ceftriaxone: R >32      -  Ampicillin: R >16 These ampicillin results predict results for amoxicillin      Method Type: MARIA M      -  Meropenem: S <=1      -  Ampicillin/Sulbactam: I 16/8      -  Cefuroxime: R >16      -  Amoxicillin/Clavulanic Acid: S <=8/4      -  Trimethoprim/Sulfamethoxazole: R >2/38      -  Ertapenem: S <=0.5    Culture - Blood (collected 13 Jan 2024 00:45)  Source: .Blood Blood-Peripheral  Gram Stain (13 Jan 2024 23:35):    Growth in aerobic bottle: Gram Positive Cocci in Clusters  Final Report (14 Jan 2024 21:03):    Growth in aerobic bottle: Staphylococcus epidermidis    Isolation of Coagulase negative Staphylococcus from single blood culture    sets may represent    contamination. Contact the Microbiology Department at [phone] if    susceptibility testing is    clinically indicated.    Culture - Blood (collected 13 Jan 2024 00:30)  Source: .Blood Blood-Peripheral  Gram Stain (13 Jan 2024 20:50):    Growth in aerobic bottle: Gram Positive Cocci in Clusters  Final Report (15 Alan 2024 17:47):    Growth in aerobic bottle: Staphylococcus hominis "Susceptibilities not    performed"    Growth in aerobic bottle: Staphylococcus epidermidis    Direct identification is available within approximately 3-5    hours either by Blood Panel Multiplexed PCR or Direct    MALDI-TOF. Details: [email or web address]  Organism: Blood Culture PCR  Staphylococcus epidermidis (15 Alan 2024 17:47)  Organism: Staphylococcus epidermidis (15 Alan 2024 17:47)    Sensitivities:      -  Clindamycin: S <=0.25      -  Oxacillin: R >2      -  Gentamicin: S <=1 Should not be used as monotherapy      -  Cefazolin: R <=4      -  Vancomycin: S 1      -  Tetracycline: S <=1      Method Type: MARIA M      -  Ampicillin/Sulbactam: R <=8/4      -  Penicillin: R 0.5      -  Rifampin: S <=1 Should not be used as monotherapy      -  Erythromycin: S <=0.25      -  Trimethoprim/Sulfamethoxazole: R >2/38  Organism: Blood Culture PCR (15 Alan 2024 17:47)    Sensitivities:      -  Staphylococcus epidermidis, Methicillin resistant: Detec      Method Type: PCR        SARS-CoV-2: NotDetec (01-13-24 @ 01:48)          ECHO pending    LE Duplex noted, no DVT on visualized veins     ___________________________________________________________________________________  ===============>>  A S S E S S M E N T   A N D   P L A N <<===============  ------------------------------------------------------------------------------------------    · Assessment	   73-year-old woman with h/o  COPD on 2L NC at home, hypothyroid,  breast CA, asthma, chronic lymphedema, wheelchair bound, obesity       presented with difficulty breathing./   had cough and difficulty breathing for the past couple of days,        then had significant trouble breathing, . had  worsened     was found by EMS to be hypoxic to 68% on 2 L nasal cannula.  /  was  placed on CPAP by EMS, which improved oxygen sat/ /  denies chest pain, syncope, fevers.      admitted wth sob, + HMPV    acute  copd  exacerbation.  on home oxygen        on  steroid /  Proventil   spiriva         CT showing pneumonia         MRSA staph epi Bacteremia > ? contamination  >>> ID appreciated          UTI on abx  Pulm and ID following, appreciated  on antibiotics for possible pneumonia / CAP      h/o   acute on  c/c diastolic  chf    on iv lasix, per  card       HTN.  HLD    Continue Current medications otherwise and monitor.    adjust medications as needed per cardio     h/o esophageal   dysmotility   c/c  lymphedema       continue current management    PT / OOB as able ( patient at baseline not very active)     hope for DC planing home in next couple of days... !     pending Echo    --------------------------------------------  Case discussed with patient,   Education given on findings and plan of care  ___________________________  H. JANICE Welch.  Pager: [phone]       _________________________________________________________________________________________  ========>>  M E D I C A L   A T T E N D I N G    F O L L O W  U P  N O T E  <<=========  -----------------------------------------------------------------------------------------------------    - Patient seen and examined by me earlier today.   - In summary,  SHEKHAR VASQUEZ is a 73y year old woman admitted with SOB  - Patient today overall doing ok, comfortable, eating OK. + occasional cough  >>> overall improved , asking about going home    patient has home O2 2L    ==================>> REVIEW OF SYSTEM <<=================    GEN: no fever, no chills, overall doing ok   RESP: no SOB at rest , no cough, no sputum  CVS: no chest pain, no palpitations  GI: no abdominal pain, no nausea, no BM X 2 days : monitoring   : no dysuria, no frequency  Neuro: no headache, no dizziness    ==================>> PHYSICAL EXAM <<=================    GEN: A&O X 3 , NAD , comfortable, pleasant, calm in bed, encouraged out of bed to chair with assistance as needed.   HEENT: NCAT, PERRL, MMM, hearing intact  CVS: S1S2 , regular , No M/R/G appreciated  PULM: CTA B/L,  limited exam due to body habitus.   ABD.: soft. non tender, non distended,  obese   Extrem: intact pulses , ++ chronic lymphedema changes >> to ACE wrap         ( Note written / Date of service 01-18-24 ( This is certified to be the same as "ENTERED" date above ( for billing purposes)))    ==================>> MEDICATIONS <<====================    albuterol    90 MICROgram(s) HFA Inhaler 2 Puff(s) Inhalation every 6 hours  albuterol/ipratropium for Nebulization 3 milliLiter(s) Nebulizer every 6 hours  aspirin enteric coated 81 milliGRAM(s) Oral daily  atorvastatin 40 milliGRAM(s) Oral at bedtime  busPIRone 20 milliGRAM(s) Oral <User Schedule>  cefTRIAXone   IVPB 1000 milliGRAM(s) IV Intermittent every 24 hours  clotrimazole 1% Cream 1 Application(s) Topical every 12 hours  DULoxetine 60 milliGRAM(s) Oral daily  furosemide    Tablet 40 milliGRAM(s) Oral daily  gabapentin 100 milliGRAM(s) Oral two times a day  guaiFENesin  milliGRAM(s) Oral every 12 hours  heparin   Injectable 5000 Unit(s) SubCutaneous every 12 hours  hydrALAZINE 50 milliGRAM(s) Oral three times a day  influenza  Vaccine (HIGH DOSE) 0.7 milliLiter(s) IntraMuscular once  levothyroxine 125 MICROGram(s) Oral daily  losartan 100 milliGRAM(s) Oral daily  montelukast 10 milliGRAM(s) Oral daily  OLANZapine 10 milliGRAM(s) Oral at bedtime  propranolol 20 milliGRAM(s) Oral two times a day    MEDICATIONS  (PRN):  acetaminophen     Tablet .. 650 milliGRAM(s) Oral every 6 hours PRN Temp greater or equal to 38C (100.4F), Mild Pain (1 - 3)    ___________  Active diet:  Diet, Regular  ___________________    ==================>> VITAL SIGNS <<==================    Vital Signs Last 24 HrsT(C): 36.4 (01-18-24 @ 11:06)  T(F): 97.6 (01-18-24 @ 11:06), Max: 98.1 (01-17-24 @ 21:56)  HR: 80 (01-18-24 @ 11:06) (63 - 85)  BP: 120/72 (01-18-24 @ 11:06)  RR: 18 (01-18-24 @ 11:06) (18 - 18)  SpO2: 93% (01-18-24 @ 11:06) (91% - 94%)       ==================>> LAB AND IMAGING <<==================       no labs today       WBC count:   9.72 <<== ,  9.18 <<==   Hemoglobin:   12.7 <<==,  12.9 <<==  platelets:  206 <==, 188 <==, 153 <==    Creatinine:  0.64  <<==, 0.54  <<==, 0.67  <<==  Sodium:   140  <==, 141  <==, 137  <==       AST:          12(01-13) <==      ALT:        13(01-13)  <==      AP:        104(01-13)  <=     Bili:        1.0(01-13)  <=    ____________________________    M I C R O B I O L O G Y :    Culture - Blood (collected 14 Jan 2024 04:46)  Source: .Blood Blood-Peripheral  Preliminary Report (18 Jan 2024 09:01):    No growth at 4 days    Culture - Blood (collected 14 Jan 2024 04:46)  Source: .Blood Blood-Peripheral  Preliminary Report (18 Jan 2024 09:01):    No growth at 4 days    Culture - Urine (collected 13 Jan 2024 02:08)  Source: Clean Catch Clean Catch (Midstream)  Final Report (16 Jan 2024 16:21):    >100,000 CFU/ml Escherichia coli ESBL  Organism: Escherichia coli ESBL (16 Jan 2024 16:21)  Organism: Escherichia coli ESBL (16 Jan 2024 16:21)    Sensitivities:      -  Levofloxacin: R >4      -  Tobramycin: S <=2      -  Nitrofurantoin: S <=32 Should not be used to treat pyelonephritis      -  Aztreonam: R 16      -  Gentamicin: S <=2      -  Cefazolin: R >16 For uncomplicated UTI with K. pneumoniae, E. coli, or P. mirablis: MARIA M <=16 is sensitive and MARIA M >=32 is resistant. This also predicts results for oral agents cefaclor, cefdinir, cefpodoxime, cefprozil, cefuroxime axetil, cephalexin and locarbef for uncomplicated UTI. Note that some isolates may be susceptible to these agents while testing resistant to cefazolin.      -  Cefepime: R 8      -  Piperacillin/Tazobactam: S <=8      -  Ciprofloxacin: R >2      -  Imipenem: S <=1      -  Ceftriaxone: R >32      -  Ampicillin: R >16 These ampicillin results predict results for amoxicillin      Method Type: MARIA M      -  Meropenem: S <=1      -  Ampicillin/Sulbactam: I 16/8      -  Cefuroxime: R >16      -  Amoxicillin/Clavulanic Acid: S <=8/4      -  Trimethoprim/Sulfamethoxazole: R >2/38      -  Ertapenem: S <=0.5    Culture - Blood (collected 13 Jan 2024 00:45)  Source: .Blood Blood-Peripheral  Gram Stain (13 Jan 2024 23:35):    Growth in aerobic bottle: Gram Positive Cocci in Clusters  Final Report (14 Jan 2024 21:03):    Growth in aerobic bottle: Staphylococcus epidermidis    Isolation of Coagulase negative Staphylococcus from single blood culture    sets may represent    contamination. Contact the Microbiology Department at [phone] if    susceptibility testing is    clinically indicated.    Culture - Blood (collected 13 Jan 2024 00:30)  Source: .Blood Blood-Peripheral  Gram Stain (13 Jan 2024 20:50):    Growth in aerobic bottle: Gram Positive Cocci in Clusters  Final Report (15 Alan 2024 17:47):    Growth in aerobic bottle: Staphylococcus hominis "Susceptibilities not    performed"    Growth in aerobic bottle: Staphylococcus epidermidis    Direct identification is available within approximately 3-5    hours either by Blood Panel Multiplexed PCR or Direct    MALDI-TOF. Details: [email or web address]  Organism: Blood Culture PCR  Staphylococcus epidermidis (15 Alan 2024 17:47)  Organism: Staphylococcus epidermidis (15 Alan 2024 17:47)    Sensitivities:      -  Clindamycin: S <=0.25      -  Oxacillin: R >2      -  Gentamicin: S <=1 Should not be used as monotherapy      -  Cefazolin: R <=4      -  Vancomycin: S 1      -  Tetracycline: S <=1      Method Type: MARIA M      -  Ampicillin/Sulbactam: R <=8/4      -  Penicillin: R 0.5      -  Rifampin: S <=1 Should not be used as monotherapy      -  Erythromycin: S <=0.25      -  Trimethoprim/Sulfamethoxazole: R >2/38  Organism: Blood Culture PCR (15 Alan 2024 17:47)    Sensitivities:      -  Staphylococcus epidermidis, Methicillin resistant: Detec      Method Type: PCR      < from: TTE W or WO Ultrasound Enhancing Agent (01.18.24 @ 14:52) >  CONCLUSIONS:     1. Left ventricular cavity is small. There are no regional wall motion abnormalities seen.   2. The right ventricle is not well visualized.   3. No pericardial effusion seen.   4. Compared to the transthoracic echocardiogram performed on 6/25/2023, there have been no significant interval changes.   5. Left ventricular endocardium is not well visualized; however, the left ventricular systolic function appears grossly normal.   6. Technically difficult image quality.  < end of copied text >      LE Duplex noted, no DVT on visualized veins     ___________________________________________________________________________________  ===============>>  A S S E S S M E N T   A N D   P L A N <<===============  ------------------------------------------------------------------------------------------    · Assessment	   73-year-old woman with h/o  COPD on 2L NC at home, hypothyroid,  breast CA, asthma, chronic lymphedema, wheelchair bound, obesity       presented with difficulty breathing./   had cough and difficulty breathing for the past couple of days,        then had significant trouble breathing, . had  worsened     was found by EMS to be hypoxic to 68% on 2 L nasal cannula.  /  was  placed on CPAP by EMS, which improved oxygen sat/ /  denies chest pain, syncope, fevers.      admitted wth sob, + HMPV    acute  copd  exacerbation.  on home oxygen        on  steroid /  Proventil   spiriva         CT showing pneumonia         MRSA staph epi Bacteremia > ? contamination  >>> ID appreciated          UTI on abx  Pulm and ID following, appreciated  on antibiotics for possible pneumonia / CAP >> Can change to oral antibiotics on discharge      h/o   acute on  c/c diastolic  chf    on iv lasix, per  card       HTN.  HLD    Continue Current medications otherwise and monitor.    adjust medications as needed per cardio     h/o esophageal   dysmotility   c/c  lymphedema       continue current management    PT / OOB as able ( patient at baseline not very active)      DC planing home     --------------------------------------------  Case discussed with patient,   Education given on findings and plan of care  ___________________________  H. JANICE Welch.  Pager: [phone]

## 2024-01-19 ENCOUNTER — TRANSCRIPTION ENCOUNTER (OUTPATIENT)
Age: 74
End: 2024-01-19

## 2024-01-19 LAB
CULTURE RESULTS: SIGNIFICANT CHANGE UP
CULTURE RESULTS: SIGNIFICANT CHANGE UP
GLUCOSE BLDC GLUCOMTR-MCNC: 111 MG/DL — HIGH (ref 70–99)
SPECIMEN SOURCE: SIGNIFICANT CHANGE UP
SPECIMEN SOURCE: SIGNIFICANT CHANGE UP

## 2024-01-19 RX ORDER — AMLODIPINE BESYLATE 2.5 MG/1
5 TABLET ORAL DAILY
Refills: 0 | Status: DISCONTINUED | OUTPATIENT
Start: 2024-01-19 | End: 2024-01-22

## 2024-01-19 RX ORDER — HYDRALAZINE HCL 50 MG
1 TABLET ORAL
Qty: 90 | Refills: 0
Start: 2024-01-19 | End: 2024-02-17

## 2024-01-19 RX ORDER — ALPRAZOLAM 0.25 MG
0.5 TABLET ORAL ONCE
Refills: 0 | Status: DISCONTINUED | OUTPATIENT
Start: 2024-01-19 | End: 2024-01-19

## 2024-01-19 RX ORDER — HYDRALAZINE HCL 50 MG
3 TABLET ORAL
Refills: 0 | DISCHARGE

## 2024-01-19 RX ADMIN — GABAPENTIN 100 MILLIGRAM(S): 400 CAPSULE ORAL at 17:01

## 2024-01-19 RX ADMIN — Medication 20 MILLIGRAM(S): at 11:02

## 2024-01-19 RX ADMIN — Medication 50 MILLIGRAM(S): at 13:46

## 2024-01-19 RX ADMIN — DULOXETINE HYDROCHLORIDE 60 MILLIGRAM(S): 30 CAPSULE, DELAYED RELEASE ORAL at 11:03

## 2024-01-19 RX ADMIN — HEPARIN SODIUM 5000 UNIT(S): 5000 INJECTION INTRAVENOUS; SUBCUTANEOUS at 05:35

## 2024-01-19 RX ADMIN — Medication 125 MICROGRAM(S): at 05:35

## 2024-01-19 RX ADMIN — Medication 3 MILLILITER(S): at 23:28

## 2024-01-19 RX ADMIN — Medication 81 MILLIGRAM(S): at 11:02

## 2024-01-19 RX ADMIN — Medication 20 MILLIGRAM(S): at 17:01

## 2024-01-19 RX ADMIN — Medication 3 MILLILITER(S): at 05:35

## 2024-01-19 RX ADMIN — Medication 20 MILLIGRAM(S): at 05:35

## 2024-01-19 RX ADMIN — GABAPENTIN 100 MILLIGRAM(S): 400 CAPSULE ORAL at 05:35

## 2024-01-19 RX ADMIN — Medication 3 MILLILITER(S): at 11:02

## 2024-01-19 RX ADMIN — LOSARTAN POTASSIUM 100 MILLIGRAM(S): 100 TABLET, FILM COATED ORAL at 05:34

## 2024-01-19 RX ADMIN — ATORVASTATIN CALCIUM 40 MILLIGRAM(S): 80 TABLET, FILM COATED ORAL at 22:38

## 2024-01-19 RX ADMIN — AMLODIPINE BESYLATE 5 MILLIGRAM(S): 2.5 TABLET ORAL at 11:09

## 2024-01-19 RX ADMIN — Medication 50 MILLIGRAM(S): at 05:34

## 2024-01-19 RX ADMIN — Medication 40 MILLIGRAM(S): at 05:34

## 2024-01-19 RX ADMIN — CEFTRIAXONE 100 MILLIGRAM(S): 500 INJECTION, POWDER, FOR SOLUTION INTRAMUSCULAR; INTRAVENOUS at 00:59

## 2024-01-19 RX ADMIN — Medication 1 APPLICATION(S): at 17:00

## 2024-01-19 RX ADMIN — MONTELUKAST 10 MILLIGRAM(S): 4 TABLET, CHEWABLE ORAL at 11:02

## 2024-01-19 RX ADMIN — Medication 3 MILLILITER(S): at 17:00

## 2024-01-19 RX ADMIN — OLANZAPINE 10 MILLIGRAM(S): 15 TABLET, FILM COATED ORAL at 22:38

## 2024-01-19 RX ADMIN — CEFTRIAXONE 100 MILLIGRAM(S): 500 INJECTION, POWDER, FOR SOLUTION INTRAMUSCULAR; INTRAVENOUS at 23:30

## 2024-01-19 RX ADMIN — Medication 50 MILLIGRAM(S): at 22:38

## 2024-01-19 RX ADMIN — Medication 600 MILLIGRAM(S): at 17:01

## 2024-01-19 RX ADMIN — Medication 600 MILLIGRAM(S): at 05:34

## 2024-01-19 RX ADMIN — HEPARIN SODIUM 5000 UNIT(S): 5000 INJECTION INTRAVENOUS; SUBCUTANEOUS at 17:01

## 2024-01-19 RX ADMIN — Medication 0.5 MILLIGRAM(S): at 16:18

## 2024-01-19 NOTE — PROGRESS NOTE ADULT - SUBJECTIVE AND OBJECTIVE BOX
Cardiovascular Disease Progress Note    Overnight events: No acute events overnight.  no new cardiac sx  Otherwise review of systems negative    Objective Findings:  T(C): 36.6 (24 @ 04:52), Max: 36.6 (24 @ 21:32)  HR: 63 (24 @ 04:52) (63 - 80)  BP: 173/84 (24 @ 04:52) (120/72 - 173/84)  RR: 18 (24 @ 04:52) (18 - 18)  SpO2: 94% (24 @ 04:52) (92% - 94%)  Wt(kg): --  Daily     Daily Weight in k.6 (2024 08:48)      Physical Exam:  Gen: NAD  HEENT: EOMI  CV: RRR, normal S1 + S2, no m/r/g  Lungs: CTAB  Abd: soft, non-tender  Ext: No edema    Telemetry:    Laboratory Data:                    Inpatient Medications:  MEDICATIONS  (STANDING):  albuterol    90 MICROgram(s) HFA Inhaler 2 Puff(s) Inhalation every 6 hours  albuterol/ipratropium for Nebulization 3 milliLiter(s) Nebulizer every 6 hours  aspirin enteric coated 81 milliGRAM(s) Oral daily  atorvastatin 40 milliGRAM(s) Oral at bedtime  busPIRone 20 milliGRAM(s) Oral <User Schedule>  cefTRIAXone   IVPB 1000 milliGRAM(s) IV Intermittent every 24 hours  clotrimazole 1% Cream 1 Application(s) Topical every 12 hours  DULoxetine 60 milliGRAM(s) Oral daily  furosemide    Tablet 40 milliGRAM(s) Oral daily  gabapentin 100 milliGRAM(s) Oral two times a day  guaiFENesin  milliGRAM(s) Oral every 12 hours  heparin   Injectable 5000 Unit(s) SubCutaneous every 12 hours  hydrALAZINE 50 milliGRAM(s) Oral three times a day  influenza  Vaccine (HIGH DOSE) 0.7 milliLiter(s) IntraMuscular once  levothyroxine 125 MICROGram(s) Oral daily  losartan 100 milliGRAM(s) Oral daily  methylPREDNISolone sodium succinate Injectable 20 milliGRAM(s) IV Push every 12 hours  montelukast 10 milliGRAM(s) Oral daily  OLANZapine 10 milliGRAM(s) Oral at bedtime  propranolol 20 milliGRAM(s) Oral two times a day      Assessment:  73-year-old history of COPD on 2L NC at home, hyperthyroid, breast CA, asthma, CHF, chronic lymphedema, wheelchair bound, presenting with difficulty breathing.  Patient has had cough and difficulty breathing for the past couple of days, today had significant trouble breathing, was found by EMS to be hypoxic to 68% on 2 L nasal cannula.  Patient placed on CPAP by EMS, which improved oxygen saturation.  Patient denies chest pain, syncope, fevers.    Recs:  cardiac stable  dyspnea likely in setting of hmpv and bacterial pneumonia  cw po lasix and ACE wraps to legs  pulmonary consulted, recs appreciated; steroids and bronchodilators per pulmonary  abx per ID  pos bcx, likely contaminant per ID  supplemental o2, wean as able. resp status improving  dvt ppx  dcp          Over 25 minutes spent on total encounter; more than 50% of the visit was spent counseling and/or coordinating care by the attending physician.      Juan Salcedo MD   Cardiovascular Disease  [phone]

## 2024-01-19 NOTE — DISCHARGE NOTE PROVIDER - NSDCMRMEDTOKEN_GEN_ALL_CORE_FT
amLODIPine 5 mg oral tablet: 1 tab(s) orally once a day  aspirin 81 mg oral delayed release tablet: 1 tab(s) orally once a day  atorvastatin 40 mg oral tablet: 1 tab(s) orally once a day (at bedtime)  busPIRone 10 mg oral tablet: 2 tab(s) orally once a day at noon  DULoxetine 60 mg oral delayed release capsule: 1 cap(s) orally 2 times a day  furosemide 40 mg oral tablet: 1 tab(s) orally once a day  gabapentin 300 mg oral capsule: 1 cap(s) orally 2 times a day (noon &amp; evening)  guaiFENesin 600 mg oral tablet, extended release: 1 tab(s) orally every 12 hours  hydrALAZINE 50 mg oral tablet: 1 tab(s) orally 3 times a day  levothyroxine 125 mcg (0.125 mg) oral tablet: 1 tab(s) orally once a day  losartan 100 mg oral tablet: 1 tab(s) orally once a day  melatonin 3 mg oral tablet: 1 tab(s) orally once a day (at bedtime)  montelukast 10 mg oral tablet: 1 tab(s) orally once a day  OLANZapine 10 mg oral tablet: 1 tab(s) orally once a day (at bedtime)  penicillin V potassium 500 mg oral tablet: 1 tab(s) orally 2 times a day for 6 months  predniSONE 5 mg oral tablet: 1 tab(s) orally once a day  propranolol 20 mg oral tablet: 1 tab(s) orally 2 times a day  Spiriva HandiHaler 18 mcg inhalation capsule: 1 cap(s) inhaled once a day   amLODIPine 5 mg oral tablet: 1 tab(s) orally once a day  aspirin 81 mg oral delayed release tablet: 1 tab(s) orally once a day  atorvastatin 40 mg oral tablet: 1 tab(s) orally once a day (at bedtime)  busPIRone 10 mg oral tablet: 2 tab(s) orally once a day at noon  DULoxetine 60 mg oral delayed release capsule: 1 cap(s) orally once a day  furosemide 40 mg oral tablet: 1 tab(s) orally once a day  gabapentin 100 mg oral capsule: 1 cap(s) orally 2 times a day  guaiFENesin 600 mg oral tablet, extended release: 1 tab(s) orally every 12 hours  hydrALAZINE 50 mg oral tablet: 1 tab(s) orally 3 times a day  levothyroxine 125 mcg (0.125 mg) oral tablet: 1 tab(s) orally once a day  losartan 100 mg oral tablet: 1 tab(s) orally once a day  melatonin 3 mg oral tablet: 1 tab(s) orally once a day (at bedtime)  montelukast 10 mg oral tablet: 1 tab(s) orally once a day  OLANZapine 10 mg oral tablet: 1 tab(s) orally once a day (at bedtime)  penicillin V potassium 500 mg oral tablet: 1 tab(s) orally 2 times a day for 6 months- Follow up with Infectious disease Doctor when to restart this medication  predniSONE 5 mg oral tablet: 1 tab(s) orally once a day  propranolol 20 mg oral tablet: 1 tab(s) orally 2 times a day  Spiriva HandiHaler 18 mcg inhalation capsule: 1 cap(s) inhaled once a day   amLODIPine 5 mg oral tablet: 1 tab(s) orally once a day  aspirin 81 mg oral delayed release tablet: 1 tab(s) orally once a day  atorvastatin 40 mg oral tablet: 1 tab(s) orally once a day (at bedtime)  busPIRone 10 mg oral tablet: 2 tab(s) orally once a day at noon  DULoxetine 60 mg oral delayed release capsule: 1 cap(s) orally once a day  furosemide 40 mg oral tablet: 1 tab(s) orally once a day  gabapentin 100 mg oral capsule: 1 cap(s) orally 2 times a day  guaiFENesin 600 mg oral tablet, extended release: 1 tab(s) orally every 12 hours  hydrALAZINE 50 mg oral tablet: 1 tab(s) orally 3 times a day  levothyroxine 125 mcg (0.125 mg) oral tablet: 1 tab(s) orally once a day  losartan 100 mg oral tablet: 1 tab(s) orally once a day  melatonin 3 mg oral tablet: 1 tab(s) orally once a day (at bedtime)  montelukast 10 mg oral tablet: 1 tab(s) orally once a day  OLANZapine 10 mg oral tablet: 1 tab(s) orally once a day (at bedtime)  penicillin V potassium 500 mg oral tablet: 1 tab(s) orally 2 times a day for 6 months- Follow up with Infectious disease Doctor when to restart this medication  propranolol 20 mg oral tablet: 1 tab(s) orally 2 times a day  Spiriva HandiHaler 18 mcg inhalation capsule: 1 cap(s) inhaled once a day   amLODIPine 5 mg oral tablet: 1 tab(s) orally once a day  aspirin 81 mg oral delayed release tablet: 1 tab(s) orally once a day  atorvastatin 40 mg oral tablet: 1 tab(s) orally once a day (at bedtime)  budesonide 0.5 mg/2 mL inhalation suspension: 2 milliliter(s) by nebulizer 2 times a day  busPIRone 10 mg oral tablet: 2 tab(s) orally once a day at noon  DULoxetine 60 mg oral delayed release capsule: 1 cap(s) orally once a day  furosemide 40 mg oral tablet: 1 tab(s) orally once a day  gabapentin 100 mg oral capsule: 1 cap(s) orally 2 times a day  guaiFENesin 600 mg oral tablet, extended release: 1 tab(s) orally every 12 hours  hydrALAZINE 50 mg oral tablet: 1 tab(s) orally 3 times a day  ipratropium-albuterol 0.5 mg-2.5 mg/3 mL inhalation solution: 3 milliliter(s) inhaled every 6 hours as needed for  shortness of breath and/or wheezing  levothyroxine 125 mcg (0.125 mg) oral tablet: 1 tab(s) orally once a day  losartan 100 mg oral tablet: 1 tab(s) orally once a day  melatonin 3 mg oral tablet: 1 tab(s) orally once a day (at bedtime)  montelukast 10 mg oral tablet: 1 tab(s) orally once a day  Nebulizer: 1  OLANZapine 10 mg oral tablet: 1 tab(s) orally once a day (at bedtime)  penicillin V potassium 500 mg oral tablet: 1 tab(s) orally 2 times a day for 6 months- Follow up with Infectious disease Doctor when to restart this medication  predniSONE 10 mg oral tablet: 3 tab(s) orally once a day 30 mg (3 tablets) once a day for 3 days. then 20 mg (2 tablets) once a day for 3 days, and then 10 mg (1 tablet) once a day for 3 days.  propranolol 20 mg oral tablet: 1 tab(s) orally 2 times a day  Spiriva HandiHaler 18 mcg inhalation capsule: 1 cap(s) inhaled once a day   amLODIPine 5 mg oral tablet: 1 tab(s) orally once a day  aspirin 81 mg oral delayed release tablet: 1 tab(s) orally once a day  atorvastatin 40 mg oral tablet: 1 tab(s) orally once a day (at bedtime)  budesonide 0.5 mg/2 mL inhalation suspension: 2 milliliter(s) by nebulizer 2 times a day  busPIRone 10 mg oral tablet: 2 tab(s) orally once a day at noon  DULoxetine 60 mg oral delayed release capsule: 1 cap(s) orally once a day  furosemide 40 mg oral tablet: 1 tab(s) orally once a day  gabapentin 100 mg oral capsule: 1 cap(s) orally 2 times a day  guaiFENesin 600 mg oral tablet, extended release: 1 tab(s) orally every 12 hours  hydrALAZINE 50 mg oral tablet: 1 tab(s) orally 3 times a day  ipratropium-albuterol 0.5 mg-2.5 mg/3 mL inhalation solution: 3 milliliter(s) inhaled every 6 hours as needed for  shortness of breath and/or wheezing  levothyroxine 125 mcg (0.125 mg) oral tablet: 1 tab(s) orally once a day  losartan 100 mg oral tablet: 1 tab(s) orally once a day  melatonin 3 mg oral tablet: 1 tab(s) orally once a day (at bedtime)  montelukast 10 mg oral tablet: 1 tab(s) orally once a day  OLANZapine 10 mg oral tablet: 1 tab(s) orally once a day (at bedtime)  penicillin V potassium 500 mg oral tablet: 1 tab(s) orally 2 times a day for 6 months- Follow up with Infectious disease Doctor when to restart this medication  predniSONE 10 mg oral tablet: 3 tab(s) orally once a day 30 mg (3 tablets) once a day for 3 days. then 20 mg (2 tablets) once a day for 3 days, and then 10 mg (1 tablet) once a day for 3 days.  propranolol 20 mg oral tablet: 1 tab(s) orally 2 times a day  Spiriva HandiHaler 18 mcg inhalation capsule: 1 cap(s) inhaled once a day   amLODIPine 5 mg oral tablet: 1 tab(s) orally once a day  aspirin 81 mg oral delayed release tablet: 1 tab(s) orally once a day  atorvastatin 40 mg oral tablet: 1 tab(s) orally once a day (at bedtime)  budesonide 0.5 mg/2 mL inhalation suspension: 2 milliliter(s) by nebulizer 2 times a day  busPIRone 10 mg oral tablet: 2 tab(s) orally once a day at noon  DULoxetine 60 mg oral delayed release capsule: 1 cap(s) orally once a day  furosemide 40 mg oral tablet: 1 tab(s) orally once a day  gabapentin 100 mg oral capsule: 1 cap(s) orally 2 times a day  guaiFENesin 600 mg oral tablet, extended release: 1 tab(s) orally every 12 hours  hydrALAZINE 50 mg oral tablet: 1 tab(s) orally 3 times a day  ipratropium-albuterol 0.5 mg-2.5 mg/3 mL inhalation solution: 3 milliliter(s) inhaled every 6 hours as needed for  shortness of breath and/or wheezing  ipratropium-albuterol 0.5 mg-2.5 mg/3 mL inhalation solution: 3 milliliter(s) by nebulizer every 6 hours as needed for  shortness of breath and/or wheezing  levothyroxine 125 mcg (0.125 mg) oral tablet: 1 tab(s) orally once a day  losartan 100 mg oral tablet: 1 tab(s) orally once a day  melatonin 3 mg oral tablet: 1 tab(s) orally once a day (at bedtime)  montelukast 10 mg oral tablet: 1 tab(s) orally once a day  OLANZapine 10 mg oral tablet: 1 tab(s) orally once a day (at bedtime)  penicillin V potassium 500 mg oral tablet: 1 tab(s) orally 2 times a day for 6 months- Follow up with Infectious disease Doctor when to restart this medication  predniSONE 10 mg oral tablet: 3 tab(s) orally once a day 30 mg (3 tablets) once a day for 3 days. then 20 mg (2 tablets) once a day for 3 days, and then 10 mg (1 tablet) once a day for 3 days.  propranolol 20 mg oral tablet: 1 tab(s) orally 2 times a day  Spiriva HandiHaler 18 mcg inhalation capsule: 1 cap(s) inhaled once a day   albuterol 90 mcg/inh inhalation aerosol: 2 puff(s) inhaled every 6 hours  amLODIPine 5 mg oral tablet: 1 tab(s) orally once a day  ascorbic acid 500 mg oral tablet: 1 tab(s) orally once a day  aspirin 81 mg oral delayed release tablet: 1 tab(s) orally once a day  atorvastatin 40 mg oral tablet: 1 tab(s) orally once a day (at bedtime)  budesonide 0.5 mg/2 mL inhalation suspension: 2 milliliter(s) by nebulizer 2 times a day  busPIRone 10 mg oral tablet: 1 tab(s) orally 2 times a day  DULoxetine 60 mg oral delayed release capsule: 1 cap(s) orally once a day  furosemide 40 mg oral tablet: 1 tab(s) orally once a day  gabapentin 100 mg oral capsule: 1 cap(s) orally 2 times a day  hydrALAZINE 50 mg oral tablet: 1 tab(s) orally 3 times a day  ipratropium-albuterol 0.5 mg-2.5 mg/3 mL inhalation solution: 3 milliliter(s) inhaled every 6 hours as needed for  shortness of breath and/or wheezing  levothyroxine 125 mcg (0.125 mg) oral tablet: 1 tab(s) orally once a day  losartan 100 mg oral tablet: 1 tab(s) orally once a day  melatonin 3 mg oral tablet: 1 tab(s) orally once a day (at bedtime)  montelukast 10 mg oral tablet: 1 tab(s) orally once a day  Multiple Vitamins oral tablet: 1 tab(s) orally once a day  OLANZapine 10 mg oral tablet: 1 tab(s) orally once a day (at bedtime)  propranolol 20 mg oral tablet: 1 tab(s) orally 2 times a day  Spiriva HandiHaler 18 mcg inhalation capsule: 1 cap(s) inhaled once a day

## 2024-01-19 NOTE — PROGRESS NOTE ADULT - SUBJECTIVE AND OBJECTIVE BOX
SHEKHAR VASQUEZ  MRN#: [id number]  Subjective:   pulmonary progress note  : SOB , Acute COPD  exacerbation , hypoxic in acute respiratory Failure , date of service is 2024   73-year-old          history of COPD on 2L NC at home, hyperthyroid, breast CA, asthma, CHF, chronic lymphedema, wheelchair bound,         presenting with difficulty breathing.     pt t has had cough and difficulty breathing for the past couple of days,  then had significant trouble breathing,      was found by EMS to be hypoxic to 68% on 2 L nasal cannula.     pt    was  placed on CPAP by EMS, which improved oxygen saturation., patient is less lethargic on BiPAP over night acceptable 02 saturation , no GI symptoms .no fever still with coughing , mild wheezing  , E coli  UTI, MRSA sepsis       pt  denies chest pain, syncope, fevers. (2024 09:52) on Antibiotics for bacterial pneumonia  C/O pain and coughing , still   SOB  on excretion  , on 2 liter nasal 02 saturation is 95% yesterday note was representing of 2024 service , patient today feeling better will start Budesonide 0.5 mg BID     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      History of carpal tunnel release  right wrist      History of parathyroidectomy      S/P laminectomy  now bed and wheelchair ridden with severe pain          FAMILY HISTORY:  Family history of myocardial infarction  mother  at age 67 and father at age 67 of MI's      OBJECTIVE:  ICU Vital Signs Last 24 Hrs  T(C): 36.9 (2024 11:28), Max: 37.2 (2024 13:58)  T(F): 98.4 (2024 11:28), Max: 99 (2024 13:58)  HR: 83 (2024 11:28) (78 - 93)  BP: 160/78 (2024 11:28) (160/78 - 185/101)  BP(mean): 110 (2024 16:50) (110 - 110)  ABP: --  ABP(mean): --  RR: 20 (2024 11:28) (19 - 20)  SpO2: 93% (2024 11:28) (93% - 99%)    O2 Parameters below as of 2024 11:28  Patient On (Oxygen Delivery Method): nasal cannula w/ humidification             @ 07:  -   @ 13:53  --------------------------------------------------------  IN: 520 mL / OUT: 1200 mL / NET: -680 mL        PHYSICAL EXAM:Daily   morbidly obese female on 3 liter nasal 02 saturation is 95%  Daily   HEENT:     + NCAT  + EOMI  - Conjuctival edema   - Icterus   - Thrush   - ETT  - NGT/OGT  Neck:         + FROM    + JVD     - Nodes     - Masses    + Mid-line trachea   - Tracheostomy  Chest:       kyphotic deformities   Lungs:          + CTA   + Rhonchi    - Rales    + Wheezing     - Decreased BS   - Dullness R L  Cardiac:       + S1 + S2    + RRR   - Irregular   - S3  - S4    - Murmurs   - Rub   - Hamman’s sign   Abdomen:    + BS     + Soft    + Non-tender     - Distended    - Organomegaly  - PEG  Extremities:   - Cyanosis U/L   - Clubbing  U/L  + LE/UE Edema   + Capillary refill    + Pulses   Neuro:        + Awake   +  Alert   - Confused   - Lethargic   - Sedated   + Generalized Weakness  Skin:        - Rashes    - Erythema   + Normal incisions   + IV sites intact  - Sacral decubit      HOSPITAL MEDICATIONS: All mediciations reviewed and analyzed  MEDICATIONS  (STANDING):  albuterol    90 MICROgram(s) HFA Inhaler 2 Puff(s) Inhalation every 6 hours  albuterol/ipratropium for Nebulization 3 milliLiter(s) Nebulizer every 6 hours  aspirin enteric coated 81 milliGRAM(s) Oral daily  atorvastatin 40 milliGRAM(s) Oral at bedtime  busPIRone 20 milliGRAM(s) Oral <User Schedule>  cefTRIAXone   IVPB      DULoxetine 60 milliGRAM(s) Oral daily  furosemide   Injectable 40 milliGRAM(s) IV Push daily  gabapentin 100 milliGRAM(s) Oral two times a day  guaiFENesin  milliGRAM(s) Oral every 12 hours  heparin   Injectable 5000 Unit(s) SubCutaneous every 12 hours  hydrALAZINE 50 milliGRAM(s) Oral three times a day  influenza  Vaccine (HIGH DOSE) 0.7 milliLiter(s) IntraMuscular once  levothyroxine 125 MICROGram(s) Oral daily  losartan 100 milliGRAM(s) Oral daily  methylPREDNISolone sodium succinate Injectable 20 milliGRAM(s) IV Push three times a day  montelukast 10 milliGRAM(s) Oral daily  OLANZapine 10 milliGRAM(s) Oral at bedtime  propranolol 20 milliGRAM(s) Oral two times a day  tiotropium 2.5 MICROgram(s) Inhaler 2 Puff(s) Inhalation daily    MEDICATIONS  (PRN):    LABS: All Lab data reviewed and analyzed                        12.7   9.72  )-----------( 206      ( 2024 07:37 )             40.3    01-16    140  |  97  |  20  ----------------------------<  159<H>  3.9   |  32<H>  |  0.64    Ca    9.8      2024 07:40      Ca    9.8      2024 07:40                 40.3    Ca    9.9      2024 04:17      Ca    9.9      2024 04:17    TPro  6.9  /  Alb  3.7  /  TBili  1.0  /  DBili  x   /  AST  12  /  ALT  13  /  AlkPhos  104  01-13    PT/INR - ( 2024 01:21 )   PT: 12.2 sec;   INR: 1.17 ratio         PTT - ( 2024 01:21 )  PTT:74.2 sec LIVER FUNCTIONS - ( 2024 01:21 )  Alb: 3.7 g/dL / Pro: 6.9 g/dL / ALK PHOS: 104 U/L / ALT: 13 U/L / AST: 12 U/L / GGT: x           RADIOLOGY: - Reviewed and analyzed

## 2024-01-19 NOTE — DISCHARGE NOTE PROVIDER - NSDCCPCAREPLAN_GEN_ALL_CORE_FT
PRINCIPAL DISCHARGE DIAGNOSIS  Diagnosis: Infection due to human metapneumovirus (hMPV)  Assessment and Plan of Treatment:       SECONDARY DISCHARGE DIAGNOSES  Diagnosis: Infection due to human metapneumovirus (hMPV)  Assessment and Plan of Treatment:      PRINCIPAL DISCHARGE DIAGNOSIS  Diagnosis: Infection due to human metapneumovirus (hMPV)  Assessment and Plan of Treatment: Conserative management   Iv fluids , continue to monitor   Symptoms resolved      SECONDARY DISCHARGE DIAGNOSES  Diagnosis: COPD exacerbation  Assessment and Plan of Treatment: Continue NEbs and steriods as ordered    Diagnosis: Acute on chronic systolic congestive heart failure  Assessment and Plan of Treatment: Weigh yourself daily.  If you gain 3lbs in 3 days, or 5lbs in a week call your Health Care Provider.  Do not eat or drink foods containing more than 2000mg of salt (sodium) in your diet every day.  Call your Health Care Provider if you have any swelling or increased swelling in your feet, ankles, and/or stomach.  Take all of your medication as directed.  If you become dizzy call your Health Care Provider.    Diagnosis: Acute UTI  Assessment and Plan of Treatment: s/p IV ABX     PRINCIPAL DISCHARGE DIAGNOSIS  Diagnosis: Infection due to human metapneumovirus (hMPV)  Assessment and Plan of Treatment: Continue supportve care. status post IV fluids. resolving      SECONDARY DISCHARGE DIAGNOSES  Diagnosis: COPD exacerbation  Assessment and Plan of Treatment: Likely exacerbated by viral infection and poor managment. continue with spiriva, you will be sent a prescription for Duonebs every 6 hours as needed. You will also be started on Budesonide inhalation via nebulizer 2 times a day. Make sure to get a prescription from your doctor once you finish this medication. follow up with your doctor in 1-2 weeks  You will complete prednisone taper as follows:  30 mg (3 tablets) once a day for 3 days. then 20 mg (2 tablets) once a day for 3 days, and then 10 mg (1 tablet) once a day for 3 days.    Diagnosis: Acute on chronic systolic congestive heart failure  Assessment and Plan of Treatment: Weigh yourself daily.  If you gain 3lbs in 3 days, or 5lbs in a week call your Health Care Provider.  Do not eat or drink foods containing more than 2000mg of salt (sodium) in your diet every day.  Call your Health Care Provider if you have any swelling or increased swelling in your feet, ankles, and/or stomach.  Take all of your medication as directed.  If you become dizzy call your Health Care Provider.    Diagnosis: Acute UTI  Assessment and Plan of Treatment: completed IV ABX

## 2024-01-19 NOTE — DISCHARGE NOTE NURSING/CASE MANAGEMENT/SOCIAL WORK - NSDCVIVACCINE_GEN_ALL_CORE_FT
Tdap; 19-Nov-2019 16:31; Christine Skaggs (RN); [employer]; [id number] (Exp. Date: 17-Sep-2021); IntraMuscular; Deltoid Left.; 0.5 milliLiter(s); VIS (VIS Published: 09-May-2013, VIS Presented: 19-Nov-2019);

## 2024-01-19 NOTE — DISCHARGE NOTE PROVIDER - NSDCCAREPROVSEEN_GEN_ALL_CORE_FT
Kamila Welch Hoorbod Delshadfar  Hoorbod Delshadfar  Hoorbod Delshadfar  Refugio Abarca  Team [facility] TCM  [facility]      [ Greater than 35 min spent for discharge services.   DIANE Welch ]       ( Note written / Date of service is the same as last day of patient stay  in the hospital ( for billing purposes)))[id number]   Hoorbod Delshadfar  Hoorbod Delshadfar  Hoorbod Delshadfar  Refugio Abarca  Team [facility] TCM  [facility]      [ Greater than 35 min spent for discharge services.   DIANE Welch ]       ( Note written / Date of service is the same as last day of patient stay  in the hospital ( for billing purposes)))

## 2024-01-19 NOTE — DISCHARGE NOTE PROVIDER - PROVIDER TOKENS
PROVIDER:[TOKEN:[[id number]],FOLLOWUP:[1 week]],PROVIDER:[TOKEN:[[id number]],FOLLOWUP:[2 weeks]],PROVIDER:[TOKEN:[[id number]],FOLLOWUP:[2 weeks]] PROVIDER:[TOKEN:[[id number]],FOLLOWUP:[1 week]] PROVIDER:[TOKEN:[[id number]],FOLLOWUP:[1 week]],PROVIDER:[TOKEN:[[id number]],FOLLOWUP:[2 weeks]]

## 2024-01-19 NOTE — DISCHARGE NOTE PROVIDER - HOSPITAL COURSE
3-year-old woman with h/o  COPD on 2L NC at home, hypothyroid,  breast CA, asthma, chronic lymphedema, wheelchair bound, obesity       presented with difficulty breathing./   had cough and difficulty breathing for the past couple of days,        then had significant trouble breathing, . had  worsened     was found by EMS to be hypoxic to 68% on 2 L nasal cannula.  /  was  placed on CPAP by EMS, which improved oxygen sat/ /  denies chest pain, syncope, fevers.      admitted wth sob, + HMPV    acute  copd  exacerbation.  on home oxygen        on  steroid /  Proventil   spiriva         CT showing pneumonia         MRSA staph epi Bacteremia > ? contamination  >>> ID appreciated          UTI on abx  Pulm and ID following, appreciated  on antibiotics for possible pneumonia / CAP >> Can change to oral antibiotics on discharge      h/o   acute on  c/c diastolic  chf    on iv lasix, per  card       HTN.  HLD    Continue Current medications otherwise and monitor.    adjust medications as needed per cardio     h/o esophageal   dysmotility   c/c  lymphedema       continue current management    PT / OOB as able ( patient at baseline not very active)      3-year-old woman with h/o  COPD on 2L NC at home, hypothyroid,  breast CA, asthma, chronic lymphedema, wheelchair bound, obesity       presented with difficulty breathing./   had cough and difficulty breathing for the past couple of days,        then had significant trouble breathing, . had  worsened     was found by EMS to be hypoxic to 68% on 2 L nasal cannula.  /  was  placed on CPAP by EMS, which improved oxygen sat/ /  denies chest pain, syncope, fevers.      admitted wth sob, + HMPV    acute  copd  exacerbation.  on home oxygen        on  steroid /  Proventil   spiriva         CT showing pneumonia         MRSA staph epi Bacteremia > ? contamination  >>> ID appreciated          UTI on abx  Pulm and ID following, appreciated  on antibiotics for possible pneumonia / CAP >> Completed      h/o   acute on  c/c diastolic  chf    on iv lasix, per  card       HTN.  HLD    Continue Current medications otherwise and monitor.    adjust medications as needed per cardio     h/o esophageal   dysmotility   c/c  lymphedema       continue current management    PT / OOB as able ( patient at baseline not very active)

## 2024-01-19 NOTE — PROGRESS NOTE ADULT - ASSESSMENT
_________________________________________________________________________________________  ========>>  M E D I C A L   A T T E N D I N G    F O L L O W  U P  N O T E  <<=========  -----------------------------------------------------------------------------------------------------    - Patient seen and examined by me earlier today.   - In summary,  SHEKHAR VASQUEZ is a 73y year old woman admitted with SOB  - Patient today overall doing ok, comfortable, eating OK. + occasional cough  >>> overall improved , asking about going home    patient has home O2 2L    ==================>> REVIEW OF SYSTEM <<=================    GEN: no fever, no chills, overall doing ok   RESP: no SOB at rest , no cough, no sputum  CVS: no chest pain, no palpitations  GI: no abdominal pain, no nausea, no BM X 2 days : monitoring   : no dysuria, no frequency  Neuro: no headache, no dizziness    ==================>> PHYSICAL EXAM <<=================    GEN: A&O X 3 , NAD , comfortable, pleasant, calm in bed, encouraged out of bed to chair with assistance as needed.   HEENT: NCAT, PERRL, MMM, hearing intact  CVS: S1S2 , regular , No M/R/G appreciated  PULM: CTA B/L,  limited exam due to body habitus.   ABD.: soft. non tender, non distended,  obese   Extrem: intact pulses , ++ chronic lymphedema changes >> to ACE wrap         ( Note written / Date of service 01-18-24 ( This is certified to be the same as "ENTERED" date above ( for billing purposes)))    ==================>> MEDICATIONS <<====================    albuterol    90 MICROgram(s) HFA Inhaler 2 Puff(s) Inhalation every 6 hours  albuterol/ipratropium for Nebulization 3 milliLiter(s) Nebulizer every 6 hours  aspirin enteric coated 81 milliGRAM(s) Oral daily  atorvastatin 40 milliGRAM(s) Oral at bedtime  busPIRone 20 milliGRAM(s) Oral <User Schedule>  cefTRIAXone   IVPB 1000 milliGRAM(s) IV Intermittent every 24 hours  clotrimazole 1% Cream 1 Application(s) Topical every 12 hours  DULoxetine 60 milliGRAM(s) Oral daily  furosemide    Tablet 40 milliGRAM(s) Oral daily  gabapentin 100 milliGRAM(s) Oral two times a day  guaiFENesin  milliGRAM(s) Oral every 12 hours  heparin   Injectable 5000 Unit(s) SubCutaneous every 12 hours  hydrALAZINE 50 milliGRAM(s) Oral three times a day  influenza  Vaccine (HIGH DOSE) 0.7 milliLiter(s) IntraMuscular once  levothyroxine 125 MICROGram(s) Oral daily  losartan 100 milliGRAM(s) Oral daily  montelukast 10 milliGRAM(s) Oral daily  OLANZapine 10 milliGRAM(s) Oral at bedtime  propranolol 20 milliGRAM(s) Oral two times a day    MEDICATIONS  (PRN):  acetaminophen     Tablet .. 650 milliGRAM(s) Oral every 6 hours PRN Temp greater or equal to 38C (100.4F), Mild Pain (1 - 3)    ___________  Active diet:  Diet, Regular  ___________________    ==================>> VITAL SIGNS <<==================    Vital Signs Last 24 HrsT(C): 36.4 (01-18-24 @ 11:06)  T(F): 97.6 (01-18-24 @ 11:06), Max: 98.1 (01-17-24 @ 21:56)  HR: 80 (01-18-24 @ 11:06) (63 - 85)  BP: 120/72 (01-18-24 @ 11:06)  RR: 18 (01-18-24 @ 11:06) (18 - 18)  SpO2: 93% (01-18-24 @ 11:06) (91% - 94%)       ==================>> LAB AND IMAGING <<==================       no labs today       WBC count:   9.72 <<== ,  9.18 <<==   Hemoglobin:   12.7 <<==,  12.9 <<==  platelets:  206 <==, 188 <==, 153 <==    Creatinine:  0.64  <<==, 0.54  <<==, 0.67  <<==  Sodium:   140  <==, 141  <==, 137  <==       AST:          12(01-13) <==      ALT:        13(01-13)  <==      AP:        104(01-13)  <=     Bili:        1.0(01-13)  <=    ____________________________    M I C R O B I O L O G Y :    Culture - Blood (collected 14 Jan 2024 04:46)  Source: .Blood Blood-Peripheral  Preliminary Report (18 Jan 2024 09:01):    No growth at 4 days    Culture - Blood (collected 14 Jan 2024 04:46)  Source: .Blood Blood-Peripheral  Preliminary Report (18 Jan 2024 09:01):    No growth at 4 days    Culture - Urine (collected 13 Jan 2024 02:08)  Source: Clean Catch Clean Catch (Midstream)  Final Report (16 Jan 2024 16:21):    >100,000 CFU/ml Escherichia coli ESBL  Organism: Escherichia coli ESBL (16 Jan 2024 16:21)  Organism: Escherichia coli ESBL (16 Jan 2024 16:21)    Sensitivities:      -  Levofloxacin: R >4      -  Tobramycin: S <=2      -  Nitrofurantoin: S <=32 Should not be used to treat pyelonephritis      -  Aztreonam: R 16      -  Gentamicin: S <=2      -  Cefazolin: R >16 For uncomplicated UTI with K. pneumoniae, E. coli, or P. mirablis: MARIA M <=16 is sensitive and MARIA M >=32 is resistant. This also predicts results for oral agents cefaclor, cefdinir, cefpodoxime, cefprozil, cefuroxime axetil, cephalexin and locarbef for uncomplicated UTI. Note that some isolates may be susceptible to these agents while testing resistant to cefazolin.      -  Cefepime: R 8      -  Piperacillin/Tazobactam: S <=8      -  Ciprofloxacin: R >2      -  Imipenem: S <=1      -  Ceftriaxone: R >32      -  Ampicillin: R >16 These ampicillin results predict results for amoxicillin      Method Type: MARIA M      -  Meropenem: S <=1      -  Ampicillin/Sulbactam: I 16/8      -  Cefuroxime: R >16      -  Amoxicillin/Clavulanic Acid: S <=8/4      -  Trimethoprim/Sulfamethoxazole: R >2/38      -  Ertapenem: S <=0.5    Culture - Blood (collected 13 Jan 2024 00:45)  Source: .Blood Blood-Peripheral  Gram Stain (13 Jan 2024 23:35):    Growth in aerobic bottle: Gram Positive Cocci in Clusters  Final Report (14 Jan 2024 21:03):    Growth in aerobic bottle: Staphylococcus epidermidis    Isolation of Coagulase negative Staphylococcus from single blood culture    sets may represent    contamination. Contact the Microbiology Department at [phone] if    susceptibility testing is    clinically indicated.    Culture - Blood (collected 13 Jan 2024 00:30)  Source: .Blood Blood-Peripheral  Gram Stain (13 Jan 2024 20:50):    Growth in aerobic bottle: Gram Positive Cocci in Clusters  Final Report (15 Alan 2024 17:47):    Growth in aerobic bottle: Staphylococcus hominis "Susceptibilities not    performed"    Growth in aerobic bottle: Staphylococcus epidermidis    Direct identification is available within approximately 3-5    hours either by Blood Panel Multiplexed PCR or Direct    MALDI-TOF. Details: [email or web address]  Organism: Blood Culture PCR  Staphylococcus epidermidis (15 Alan 2024 17:47)  Organism: Staphylococcus epidermidis (15 Alan 2024 17:47)    Sensitivities:      -  Clindamycin: S <=0.25      -  Oxacillin: R >2      -  Gentamicin: S <=1 Should not be used as monotherapy      -  Cefazolin: R <=4      -  Vancomycin: S 1      -  Tetracycline: S <=1      Method Type: MARIA M      -  Ampicillin/Sulbactam: R <=8/4      -  Penicillin: R 0.5      -  Rifampin: S <=1 Should not be used as monotherapy      -  Erythromycin: S <=0.25      -  Trimethoprim/Sulfamethoxazole: R >2/38  Organism: Blood Culture PCR (15 Alan 2024 17:47)    Sensitivities:      -  Staphylococcus epidermidis, Methicillin resistant: Detec      Method Type: PCR      < from: TTE W or WO Ultrasound Enhancing Agent (01.18.24 @ 14:52) >  CONCLUSIONS:     1. Left ventricular cavity is small. There are no regional wall motion abnormalities seen.   2. The right ventricle is not well visualized.   3. No pericardial effusion seen.   4. Compared to the transthoracic echocardiogram performed on 6/25/2023, there have been no significant interval changes.   5. Left ventricular endocardium is not well visualized; however, the left ventricular systolic function appears grossly normal.   6. Technically difficult image quality.  < end of copied text >      LE Duplex noted, no DVT on visualized veins     ___________________________________________________________________________________  ===============>>  A S S E S S M E N T   A N D   P L A N <<===============  ------------------------------------------------------------------------------------------    · Assessment	   73-year-old woman with h/o  COPD on 2L NC at home, hypothyroid,  breast CA, asthma, chronic lymphedema, wheelchair bound, obesity       presented with difficulty breathing./   had cough and difficulty breathing for the past couple of days,        then had significant trouble breathing, . had  worsened     was found by EMS to be hypoxic to 68% on 2 L nasal cannula.  /  was  placed on CPAP by EMS, which improved oxygen sat/ /  denies chest pain, syncope, fevers.      admitted wth sob, + HMPV    acute  copd  exacerbation.  on home oxygen        on  steroid /  Proventil   spiriva         CT showing pneumonia         MRSA staph epi Bacteremia > ? contamination  >>> ID appreciated          UTI on abx  Pulm and ID following, appreciated  on antibiotics for possible pneumonia / CAP >> Can change to oral antibiotics on discharge      h/o   acute on  c/c diastolic  chf    on iv lasix, per  card       HTN.  HLD    Continue Current medications otherwise and monitor.    adjust medications as needed per cardio     h/o esophageal   dysmotility   c/c  lymphedema       continue current management    PT / OOB as able ( patient at baseline not very active)      DC planing home     --------------------------------------------  Case discussed with patient,   Education given on findings and plan of care  ___________________________  H. JANICE Welch.  Pager: [phone]       _________________________________________________________________________________________  ========>>  M E D I C A L   A T T E N D I N G    F O L L O W  U P  N O T E  <<=========  -----------------------------------------------------------------------------------------------------    - Patient seen and examined by me earlier today.   - In summary,  SHEKHAR VASQUEZ is a 73y year old woman admitted with SOB  - Patient today overall doing ok, comfortable, eating OK. + occasional cough  >>> overall improved , asking about going home    patient has home O2 2L    ==================>> REVIEW OF SYSTEM <<=================    GEN: no fever, no chills, overall doing ok   RESP: no SOB at rest , no cough, no sputum  CVS: no chest pain, no palpitations  GI: no abdominal pain, no nausea, no BM X 2 days : monitoring   : no dysuria, no frequency  Neuro: no headache, no dizziness    ==================>> PHYSICAL EXAM <<=================    GEN: A&O X 3 , NAD , comfortable, pleasant, calm in bed, encouraged out of bed to chair with assistance as needed.   HEENT: NCAT, PERRL, MMM, hearing intact  CVS: S1S2 , regular , No M/R/G appreciated  PULM: CTA B/L,  limited exam due to body habitus.   ABD.: soft. non tender, non distended,  obese   Extrem: intact pulses , ++ chronic lymphedema changes >> to ACE wrap        ( Note written / Date of service 01-19-24 ( This is certified to be the same as "ENTERED" date above ( for billing purposes)))    ==================>> MEDICATIONS <<====================    albuterol    90 MICROgram(s) HFA Inhaler 2 Puff(s) Inhalation every 6 hours  albuterol/ipratropium for Nebulization 3 milliLiter(s) Nebulizer every 6 hours  amLODIPine   Tablet 5 milliGRAM(s) Oral daily  aspirin enteric coated 81 milliGRAM(s) Oral daily  atorvastatin 40 milliGRAM(s) Oral at bedtime  busPIRone 20 milliGRAM(s) Oral <User Schedule>  cefTRIAXone   IVPB 1000 milliGRAM(s) IV Intermittent every 24 hours  clotrimazole 1% Cream 1 Application(s) Topical every 12 hours  DULoxetine 60 milliGRAM(s) Oral daily  furosemide    Tablet 40 milliGRAM(s) Oral daily  gabapentin 100 milliGRAM(s) Oral two times a day  guaiFENesin  milliGRAM(s) Oral every 12 hours  heparin   Injectable 5000 Unit(s) SubCutaneous every 12 hours  hydrALAZINE 50 milliGRAM(s) Oral three times a day  influenza  Vaccine (HIGH DOSE) 0.7 milliLiter(s) IntraMuscular once  levothyroxine 125 MICROGram(s) Oral daily  losartan 100 milliGRAM(s) Oral daily  methylPREDNISolone sodium succinate Injectable 20 milliGRAM(s) IV Push every 12 hours  montelukast 10 milliGRAM(s) Oral daily  OLANZapine 10 milliGRAM(s) Oral at bedtime  propranolol 20 milliGRAM(s) Oral two times a day    MEDICATIONS  (PRN):  acetaminophen     Tablet .. 650 milliGRAM(s) Oral every 6 hours PRN Temp greater or equal to 38C (100.4F), Mild Pain (1 - 3)    ___________  Active diet:  Diet, Regular  ___________________    ==================>> VITAL SIGNS <<==================    Vital Signs Last 24 HrsT(C): 36.6 (01-19-24 @ 15:47)  T(F): 97.9 (01-19-24 @ 15:47), Max: 98.1 (01-19-24 @ 11:07)  HR: 70 (01-19-24 @ 18:58) (63 - 87)  BP: 118/68 (01-19-24 @ 16:58)  RR: 18 (01-19-24 @ 15:47) (18 - 18)  SpO2: 93% (01-19-24 @ 16:58) (92% - 94%)        POCT Blood Glucose.: 111 mg/dL (19 Jan 2024 15:56)     ==================>> LAB AND IMAGING <<==================         No labs today    WBC count:   9.72 <<== ,  9.18 <<==   Hemoglobin:   12.7 <<==,  12.9 <<==  platelets:  206 <==, 188 <==    Creatinine:  0.64  <<==, 0.54  <<==  Sodium:   140  <==, 141  <==    ____________________________    M I C R O B I O L O G Y :    Culture - Blood (collected 14 Jan 2024 04:46)  Source: .Blood Blood-Peripheral  Final Report (19 Jan 2024 09:01):    No growth at 5 days    Culture - Blood (collected 14 Jan 2024 04:46)  Source: .Blood Blood-Peripheral  Final Report (19 Jan 2024 09:01):    No growth at 5 days    Culture - Urine (collected 13 Jan 2024 02:08)  Source: Clean Catch Clean Catch (Midstream)  Final Report (16 Jan 2024 16:21):    >100,000 CFU/ml Escherichia coli ESBL  Organism: Escherichia coli ESBL (16 Jan 2024 16:21)  Organism: Escherichia coli ESBL (16 Jan 2024 16:21)    Sensitivities:      -  Levofloxacin: R >4      -  Tobramycin: S <=2      -  Nitrofurantoin: S <=32 Should not be used to treat pyelonephritis      -  Aztreonam: R 16      -  Gentamicin: S <=2      -  Cefazolin: R >16 For uncomplicated UTI with K. pneumoniae, E. coli, or P. mirablis: MARIA M <=16 is sensitive and MARIA M >=32 is resistant. This also predicts results for oral agents cefaclor, cefdinir, cefpodoxime, cefprozil, cefuroxime axetil, cephalexin and locarbef for uncomplicated UTI. Note that some isolates may be susceptible to these agents while testing resistant to cefazolin.      -  Cefepime: R 8      -  Piperacillin/Tazobactam: S <=8      -  Ciprofloxacin: R >2      -  Imipenem: S <=1      -  Ceftriaxone: R >32      -  Ampicillin: R >16 These ampicillin results predict results for amoxicillin      Method Type: MARIA M      -  Meropenem: S <=1      -  Ampicillin/Sulbactam: I 16/8      -  Cefuroxime: R >16      -  Amoxicillin/Clavulanic Acid: S <=8/4      -  Trimethoprim/Sulfamethoxazole: R >2/38      -  Ertapenem: S <=0.5    Culture - Blood (collected 13 Jan 2024 00:45)  Source: .Blood Blood-Peripheral  Gram Stain (13 Jan 2024 23:35):    Growth in aerobic bottle: Gram Positive Cocci in Clusters  Final Report (14 Jan 2024 21:03):    Growth in aerobic bottle: Staphylococcus epidermidis    Isolation of Coagulase negative Staphylococcus from single blood culture    sets may represent    contamination. Contact the Microbiology Department at [phone] if    susceptibility testing is    clinically indicated.    Culture - Blood (collected 13 Jan 2024 00:30)  Source: .Blood Blood-Peripheral  Gram Stain (13 Jan 2024 20:50):    Growth in aerobic bottle: Gram Positive Cocci in Clusters  Final Report (15 Alan 2024 17:47):    Growth in aerobic bottle: Staphylococcus hominis "Susceptibilities not    performed"    Growth in aerobic bottle: Staphylococcus epidermidis    Direct identification is available within approximately 3-5    hours either by Blood Panel Multiplexed PCR or Direct    MALDI-TOF. Details: [email or web address]  Organism: Blood Culture PCR  Staphylococcus epidermidis (15 Alan 2024 17:47)  Organism: Staphylococcus epidermidis (15 Alan 2024 17:47)    Sensitivities:      -  Clindamycin: S <=0.25      -  Oxacillin: R >2      -  Gentamicin: S <=1 Should not be used as monotherapy      -  Cefazolin: R <=4      -  Vancomycin: S 1      -  Tetracycline: S <=1      Method Type: MARIA M      -  Ampicillin/Sulbactam: R <=8/4      -  Penicillin: R 0.5      -  Rifampin: S <=1 Should not be used as monotherapy      -  Erythromycin: S <=0.25      -  Trimethoprim/Sulfamethoxazole: R >2/38  Organism: Blood Culture PCR (15 Alan 2024 17:47)    Sensitivities:      -  Staphylococcus epidermidis, Methicillin resistant: Detec      Method Type: PCR    < from: TTE W or WO Ultrasound Enhancing Agent (01.18.24 @ 14:52) >  CONCLUSIONS:     1. Left ventricular cavity is small. There are no regional wall motion abnormalities seen.   2. The right ventricle is not well visualized.   3. No pericardial effusion seen.   4. Compared to the transthoracic echocardiogram performed on 6/25/2023, there have been no significant interval changes.   5. Left ventricular endocardium is not well visualized; however, the left ventricular systolic function appears grossly normal.   6. Technically difficult image quality.  < end of copied text >      LE Duplex noted, no DVT on visualized veins     ___________________________________________________________________________________  ===============>>  A S S E S S M E N T   A N D   P L A N <<===============  ------------------------------------------------------------------------------------------    · Assessment	   73-year-old woman with h/o  COPD on 2L NC at home, hypothyroid,  breast CA, asthma, chronic lymphedema, wheelchair bound, obesity       presented with difficulty breathing./   had cough and difficulty breathing for the past couple of days,        then had significant trouble breathing, . had  worsened     was found by EMS to be hypoxic to 68% on 2 L nasal cannula.  /  was  placed on CPAP by EMS, which improved oxygen sat/ /  denies chest pain, syncope, fevers.      admitted wth sob, + HMPV    acute  copd  exacerbation.  on home oxygen        on  steroid /  Proventil   spiriva         CT showing pneumonia         MRSA staph epi Bacteremia > ? contamination  >>> ID appreciated          UTI on abx  Pulm and ID following, appreciated  on antibiotics for possible pneumonia / CAP >> Can change to oral antibiotics on discharge      h/o   acute on  c/c diastolic  chf    on iv lasix, per  card       HTN.  HLD    Continue Current medications otherwise and monitor.    adjust medications as needed per cardio     h/o esophageal   dysmotility   c/c  lymphedema       continue current management    PT / OOB as able ( patient at baseline not very active)      DC planing home Likely tomorrow on oral steroids     home care being set up as well / home health aides    --------------------------------------------  Case discussed with patient, , Nurse Practitioner  Education given on findings and plan of care  ___________________________  DIANE Welch D.O.  Pager: [phone]

## 2024-01-19 NOTE — DISCHARGE NOTE NURSING/CASE MANAGEMENT/SOCIAL WORK - PATIENT PORTAL LINK FT
You can access the FollowMyHealth Patient Portal offered by [facility] by registering at the following website: [email or web address]. By joining [facility]’s FollowMyHealth portal, you will also be able to view your health information using other applications (apps) compatible with our system.

## 2024-01-19 NOTE — PROGRESS NOTE ADULT - ASSESSMENT
Assessment and Recommendation:   Acute hypoxic respiratory Failure on 02 on top of chronic   keep 02 saturation > 87%  Acute COPD exacerbation  H/O graves disease   S/P left nephrectomy for renal cell carcinoma   Morbid obese and NOEMY   bilateral chronic lymphedema  S/P cervical laminectomy and cage placement   wheel chair bound and home bound   chronic pain syndrome   solumedrol 20 mg Q 12 hrs   Budesonide 0.5 mg BID   Duoneb q 6 hrs   continue Rocephin one gram Q 24 hrs   DVT and GI prophylaxis   discuss with medical PA   spend 40 minutes on consultation and discussion with PCP > 50% counselling the patient

## 2024-01-19 NOTE — DISCHARGE NOTE PROVIDER - CARE PROVIDER_API CALL
Juan Salcedo  Cardiovascular Disease  [address] [place] [address]  Phone: [phone]  Fax: [phone]  Follow Up Time: 1 week    Kamila Welch  Internal Medicine  [address] [place] [address]  Phone: [phone]  Fax: [phone]  Follow Up Time: 2 weeks    Tylor Pelletier  Critical Care Medicine  [address] [place] [address], [place] [address]  Phone: [phone]  Fax: [phone]  Follow Up Time: 2 weeks   Juan Salcedo  Cardiovascular Disease  [address] [place] [address]  Phone: [phone]  Fax: [phone]  Follow Up Time: 1 week   Juan Slacedo  Cardiovascular Disease  [address] [place] [address]  Phone: [phone]  Fax: [phone]  Follow Up Time: 1 week    Tylor Pelletier  Critical Care Medicine  [address] [place] [address], [place] [address]  Phone: [phone]  Fax: [phone]  Follow Up Time: 2 weeks

## 2024-01-19 NOTE — DISCHARGE NOTE PROVIDER - CARE PROVIDERS DIRECT ADDRESSES
,DirectAddress_Unknown,[email or web address],DirectAddress_Unknown ,DirectAddress_Unknown ,DirectAddress_Unknown,DirectAddress_Unknown

## 2024-01-20 RX ORDER — DULOXETINE HYDROCHLORIDE 30 MG/1
1 CAPSULE, DELAYED RELEASE ORAL
Refills: 0 | DISCHARGE

## 2024-01-20 RX ORDER — PENICILLIN V POTASIUM 500 MG/1
1 TABLET OROPHARYNGEAL
Qty: 60 | Refills: 6
Start: 2024-01-20 | End: 2024-08-16

## 2024-01-20 RX ORDER — GABAPENTIN 400 MG/1
1 CAPSULE ORAL
Qty: 60 | Refills: 0
Start: 2024-01-20 | End: 2024-02-18

## 2024-01-20 RX ORDER — GABAPENTIN 400 MG/1
1 CAPSULE ORAL
Refills: 0 | DISCHARGE

## 2024-01-20 RX ORDER — BUDESONIDE, MICRONIZED 100 %
0.5 POWDER (GRAM) MISCELLANEOUS EVERY 12 HOURS
Refills: 0 | Status: DISCONTINUED | OUTPATIENT
Start: 2024-01-20 | End: 2024-01-22

## 2024-01-20 RX ORDER — DULOXETINE HYDROCHLORIDE 30 MG/1
1 CAPSULE, DELAYED RELEASE ORAL
Qty: 30 | Refills: 0
Start: 2024-01-20 | End: 2024-02-18

## 2024-01-20 RX ORDER — ALPRAZOLAM 0.25 MG
0.5 TABLET ORAL ONCE
Refills: 0 | Status: DISCONTINUED | OUTPATIENT
Start: 2024-01-20 | End: 2024-01-20

## 2024-01-20 RX ADMIN — Medication 0.5 MILLIGRAM(S): at 14:19

## 2024-01-20 RX ADMIN — Medication 1 APPLICATION(S): at 05:39

## 2024-01-20 RX ADMIN — DULOXETINE HYDROCHLORIDE 60 MILLIGRAM(S): 30 CAPSULE, DELAYED RELEASE ORAL at 12:10

## 2024-01-20 RX ADMIN — CEFTRIAXONE 100 MILLIGRAM(S): 500 INJECTION, POWDER, FOR SOLUTION INTRAMUSCULAR; INTRAVENOUS at 23:43

## 2024-01-20 RX ADMIN — MONTELUKAST 10 MILLIGRAM(S): 4 TABLET, CHEWABLE ORAL at 12:10

## 2024-01-20 RX ADMIN — Medication 50 MILLIGRAM(S): at 05:40

## 2024-01-20 RX ADMIN — Medication 3 MILLILITER(S): at 05:39

## 2024-01-20 RX ADMIN — Medication 50 MILLIGRAM(S): at 21:16

## 2024-01-20 RX ADMIN — Medication 20 MILLIGRAM(S): at 17:16

## 2024-01-20 RX ADMIN — Medication 600 MILLIGRAM(S): at 05:39

## 2024-01-20 RX ADMIN — ATORVASTATIN CALCIUM 40 MILLIGRAM(S): 80 TABLET, FILM COATED ORAL at 21:16

## 2024-01-20 RX ADMIN — Medication 50 MILLIGRAM(S): at 14:48

## 2024-01-20 RX ADMIN — Medication 20 MILLIGRAM(S): at 12:10

## 2024-01-20 RX ADMIN — Medication 3 MILLILITER(S): at 17:18

## 2024-01-20 RX ADMIN — HEPARIN SODIUM 5000 UNIT(S): 5000 INJECTION INTRAVENOUS; SUBCUTANEOUS at 05:39

## 2024-01-20 RX ADMIN — Medication 600 MILLIGRAM(S): at 17:16

## 2024-01-20 RX ADMIN — Medication 0.5 MILLIGRAM(S): at 17:19

## 2024-01-20 RX ADMIN — GABAPENTIN 100 MILLIGRAM(S): 400 CAPSULE ORAL at 05:40

## 2024-01-20 RX ADMIN — HEPARIN SODIUM 5000 UNIT(S): 5000 INJECTION INTRAVENOUS; SUBCUTANEOUS at 17:16

## 2024-01-20 RX ADMIN — Medication 125 MICROGRAM(S): at 05:40

## 2024-01-20 RX ADMIN — Medication 3 MILLILITER(S): at 12:10

## 2024-01-20 RX ADMIN — Medication 40 MILLIGRAM(S): at 05:40

## 2024-01-20 RX ADMIN — Medication 20 MILLIGRAM(S): at 05:38

## 2024-01-20 RX ADMIN — Medication 3 MILLILITER(S): at 23:43

## 2024-01-20 RX ADMIN — Medication 1 APPLICATION(S): at 17:15

## 2024-01-20 RX ADMIN — GABAPENTIN 100 MILLIGRAM(S): 400 CAPSULE ORAL at 17:16

## 2024-01-20 RX ADMIN — Medication 81 MILLIGRAM(S): at 12:09

## 2024-01-20 RX ADMIN — LOSARTAN POTASSIUM 100 MILLIGRAM(S): 100 TABLET, FILM COATED ORAL at 05:41

## 2024-01-20 RX ADMIN — OLANZAPINE 10 MILLIGRAM(S): 15 TABLET, FILM COATED ORAL at 21:16

## 2024-01-20 RX ADMIN — AMLODIPINE BESYLATE 5 MILLIGRAM(S): 2.5 TABLET ORAL at 05:40

## 2024-01-20 NOTE — PROGRESS NOTE ADULT - SUBJECTIVE AND OBJECTIVE BOX
Cardiovascular Disease Progress Note    Overnight events: No acute events overnight.  no new cardiac sx  Otherwise review of systems negative    Objective Findings:  T(C): 36.5 (24 @ 04:58), Max: 36.7 (24 @ 11:07)  HR: 64 (24 @ 04:58) (64 - 87)  BP: 162/80 (24 @ 04:58) (118/68 - 170/88)  RR: 18 (24 @ 04:58) (18 - 18)  SpO2: 94% (24 @ 04:58) (92% - 94%)  Wt(kg): --  Daily     Daily Weight in k.3 (2024 08:30)      Physical Exam:  Gen: NAD  HEENT: EOMI  CV: RRR, normal S1 + S2, no m/r/g  Lungs: CTAB  Abd: soft, non-tender  Ext: No edema    Telemetry:    Laboratory Data:                    Inpatient Medications:  MEDICATIONS  (STANDING):  albuterol    90 MICROgram(s) HFA Inhaler 2 Puff(s) Inhalation every 6 hours  albuterol/ipratropium for Nebulization 3 milliLiter(s) Nebulizer every 6 hours  amLODIPine   Tablet 5 milliGRAM(s) Oral daily  aspirin enteric coated 81 milliGRAM(s) Oral daily  atorvastatin 40 milliGRAM(s) Oral at bedtime  busPIRone 20 milliGRAM(s) Oral <User Schedule>  cefTRIAXone   IVPB 1000 milliGRAM(s) IV Intermittent every 24 hours  clotrimazole 1% Cream 1 Application(s) Topical every 12 hours  DULoxetine 60 milliGRAM(s) Oral daily  furosemide    Tablet 40 milliGRAM(s) Oral daily  gabapentin 100 milliGRAM(s) Oral two times a day  guaiFENesin  milliGRAM(s) Oral every 12 hours  heparin   Injectable 5000 Unit(s) SubCutaneous every 12 hours  hydrALAZINE 50 milliGRAM(s) Oral three times a day  influenza  Vaccine (HIGH DOSE) 0.7 milliLiter(s) IntraMuscular once  levothyroxine 125 MICROGram(s) Oral daily  losartan 100 milliGRAM(s) Oral daily  methylPREDNISolone sodium succinate Injectable 20 milliGRAM(s) IV Push every 12 hours  montelukast 10 milliGRAM(s) Oral daily  OLANZapine 10 milliGRAM(s) Oral at bedtime  propranolol 20 milliGRAM(s) Oral two times a day      Assessment:  73-year-old history of COPD on 2L NC at home, hyperthyroid, breast CA, asthma, CHF, chronic lymphedema, wheelchair bound, presenting with difficulty breathing.  Patient has had cough and difficulty breathing for the past couple of days, today had significant trouble breathing, was found by EMS to be hypoxic to 68% on 2 L nasal cannula.  Patient placed on CPAP by EMS, which improved oxygen saturation.  Patient denies chest pain, syncope, fevers.    Recs:  cardiac stable  dyspnea likely in setting of hmpv and bacterial pneumonia, resolving  TTE (tds) --> wnl  cw po lasix and ACE wraps to legs  pulmonary consulted, recs appreciated; steroids and bronchodilators per pulmonary  abx per ID  pos bcx, likely contaminant per ID  supplemental o2, wean as able. resp status improving  dvt ppx  dcp with home services        Over 25 minutes spent on total encounter; more than 50% of the visit was spent counseling and/or coordinating care by the attending physician.      Juan Salcedo MD   Cardiovascular Disease  [phone]

## 2024-01-20 NOTE — PROGRESS NOTE ADULT - SUBJECTIVE AND OBJECTIVE BOX
SHEKHAR VASQUEZ  MRN#: [id number]  Subjective:   pulmonary progress note  : SOB , Acute COPD  exacerbation , hypoxic in acute respiratory Failure , date of service is 2024   73-year-old          history of COPD on 2L NC at home, hyperthyroid, breast CA, asthma, CHF, chronic lymphedema, wheelchair bound,         presenting with difficulty breathing.     pt t has had cough and difficulty breathing for the past couple of days,  then had significant trouble breathing,      was found by EMS to be hypoxic to 68% on 2 L nasal cannula.     pt    was  placed on CPAP by EMS, which improved oxygen saturation., patient is less lethargic on BiPAP over night acceptable 02 saturation , no GI symptoms .no fever still with coughing , mild wheezing  , E coli  UTI, MRSA sepsis       pt  denies chest pain, syncope, fevers. (2024 09:52) on Antibiotics for bacterial pneumonia  C/O pain and coughing , still   SOB  on excretion  , on 2 liter nasal 02 saturation is 95% yesterday note was representing of 2024 service , patient today feeling better will start Budesonide 0.5 mg BID , felling good less wheezing no dizziness , mild coughing To D/C solumedrol , prednisone 30 mg po daily     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      History of carpal tunnel release  right wrist      History of parathyroidectomy      S/P laminectomy  now bed and wheelchair ridden with severe pain          FAMILY HISTORY:  Family history of myocardial infarction  mother  at age 67 and father at age 67 of MI's      OBJECTIVE:  ICU Vital Signs Last 24 Hrs  T(C): 36.9 (2024 11:28), Max: 37.2 (2024 13:58)  T(F): 98.4 (2024 11:28), Max: 99 (2024 13:58)  HR: 83 (2024 11:28) (78 - 93)  BP: 160/78 (2024 11:28) (160/78 - 185/101)  BP(mean): 110 (2024 16:50) (110 - 110)  ABP: --  ABP(mean): --  RR: 20 (2024 11:28) (19 - 20)  SpO2: 93% (2024 11:28) (93% - 99%)    O2 Parameters below as of 2024 11:28  Patient On (Oxygen Delivery Method): nasal cannula w/ humidification             @ 07:  -   @ 13:53  --------------------------------------------------------  IN: 520 mL / OUT: 1200 mL / NET: -680 mL        PHYSICAL EXAM:Daily   morbidly obese female on 3 liter nasal 02 saturation is 95%  Daily   HEENT:     + NCAT  + EOMI  - Conjuctival edema   - Icterus   - Thrush   - ETT  - NGT/OGT  Neck:         + FROM    + JVD     - Nodes     - Masses    + Mid-line trachea   - Tracheostomy  Chest:       kyphotic deformities   Lungs:          + CTA   + Rhonchi    - Rales    + Wheezing     - Decreased BS   - Dullness R L  Cardiac:       + S1 + S2    + RRR   - Irregular   - S3  - S4    - Murmurs   - Rub   - Hamman’s sign   Abdomen:    + BS     + Soft    + Non-tender     - Distended    - Organomegaly  - PEG  Extremities:   - Cyanosis U/L   - Clubbing  U/L  + LE/UE Edema   + Capillary refill    + Pulses   Neuro:        + Awake   +  Alert   - Confused   - Lethargic   - Sedated   + Generalized Weakness  Skin:        - Rashes    - Erythema   + Normal incisions   + IV sites intact  - Sacral decubit      HOSPITAL MEDICATIONS: All mediciations reviewed and analyzed  MEDICATIONS  (STANDING):  albuterol    90 MICROgram(s) HFA Inhaler 2 Puff(s) Inhalation every 6 hours  albuterol/ipratropium for Nebulization 3 milliLiter(s) Nebulizer every 6 hours  aspirin enteric coated 81 milliGRAM(s) Oral daily  atorvastatin 40 milliGRAM(s) Oral at bedtime  busPIRone 20 milliGRAM(s) Oral <User Schedule>  cefTRIAXone   IVPB      DULoxetine 60 milliGRAM(s) Oral daily  furosemide   Injectable 40 milliGRAM(s) IV Push daily  gabapentin 100 milliGRAM(s) Oral two times a day  guaiFENesin  milliGRAM(s) Oral every 12 hours  heparin   Injectable 5000 Unit(s) SubCutaneous every 12 hours  hydrALAZINE 50 milliGRAM(s) Oral three times a day  influenza  Vaccine (HIGH DOSE) 0.7 milliLiter(s) IntraMuscular once  levothyroxine 125 MICROGram(s) Oral daily  losartan 100 milliGRAM(s) Oral daily  methylPREDNISolone sodium succinate Injectable 20 milliGRAM(s) IV Push three times a day  montelukast 10 milliGRAM(s) Oral daily  OLANZapine 10 milliGRAM(s) Oral at bedtime  propranolol 20 milliGRAM(s) Oral two times a day  tiotropium 2.5 MICROgram(s) Inhaler 2 Puff(s) Inhalation daily    MEDICATIONS  (PRN):    LABS: All Lab data reviewed and analyzed                        12.7   9.72  )-----------( 206      ( 2024 07:37 )             40.3    01-16    140  |  97  |  20  ----------------------------<  159<H>  3.9   |  32<H>  |  0.64    Ca    9.8      2024 07:40      Ca    9.8      2024 07:40                 40.3    Ca    9.9      2024 04:17      Ca    9.9      2024 04:17    TPro  6.9  /  Alb  3.7  /  TBili  1.0  /  DBili  x   /  AST  12  /  ALT  13  /  AlkPhos  104  01-13    PT/INR - ( 2024 01:21 )   PT: 12.2 sec;   INR: 1.17 ratio         PTT - ( 2024 01:21 )  PTT:74.2 sec LIVER FUNCTIONS - ( 2024 01:21 )  Alb: 3.7 g/dL / Pro: 6.9 g/dL / ALK PHOS: 104 U/L / ALT: 13 U/L / AST: 12 U/L / GGT: x           RADIOLOGY: - Reviewed and analyzed

## 2024-01-20 NOTE — CHART NOTE - NSCHARTNOTEFT_GEN_A_CORE
Discharge on hold   - Spoke with Dr. Silva- pulmonologist   - added Budesonide - per pulm will likely transition to po steroids tomm

## 2024-01-20 NOTE — PROGRESS NOTE ADULT - ASSESSMENT
73-year-old          h/o  COPD on 2L NC at home, hypothyroid,  breast CA, asthma, CHF, chronic lymphedema, wheelchair bound,         presenting with difficulty breathing./   had cough and difficulty breathing for the past couple of days,        then had significant trouble breathing, . had  worsened     was found by EMS to be hypoxic to 68% on 2 L nasal cannula.  /  was  placed on CPAP by EMS, which improved oxygen sat/ /  denies chest pain, syncope, fevers.      admitted wt sob    acute  copd  exacerbation.  on home oxygen        on  iv sterod. /  Proventil   spiriva         cxr.  no  pna     h/o   acute on  c/c diastolic  chf    on iv lasix, per  acrd    Ca  breast    HTN.  HLD    h/o esophageal   dysmotility   c/c  lymphedema     bcx.   staph  epi.  rpt  bcx,  negative/  ucx, esbl,    pt on iv rocephin /  pe r id  till 1/21/  pna        on lasix, hydralazine/ cozaar/ nrvasc   dvt ppx.  s/q    heparin       rad< from: TTE W or WO Ultrasound Enhancing Agent (06.25.23 @ 08:42) >   1. Normal left ventricular cavity size. The left ventricular wall thickness is normal. The left ventricular systolic function is normal with an ejection fraction of 71 % by Nagel's method of disks. There are no regional wall motion abnormalities seen.   2. There is mild (grade 1) left ventricular diastolic dysfunction.   3. Normal atria.   4. Normal right ventricular cavity size, normal right ventricular wall thickness and normal right ventricular systolic function.   5. No significant valvular disease.   6. Compared to the transthoracic echocardiogram performed on 3/22/2023 there have been no significant interval changes.  ________________________________________________________________________________________  FINDINGS:  < end of copied text >       rad< from: Xray Esophagram Single Contrast (06.26.23 @ 16:27) >  IMPRESSION:  Extensive esophageal dysmotility with tertiary contractions consistent   with dysmotility.  --- End of Report ---  < end of copied text >

## 2024-01-20 NOTE — PROGRESS NOTE ADULT - SUBJECTIVE AND OBJECTIVE BOX
date of service: 01-20-24 @ 14:14  [facility]  REVIEW OF SYSTEMS:  CONSTITUTIONAL: No fever,  no  weight loss  ENT:  No  tinnitus,   no   vertigo  NECK: No pain or stiffness  RESPIRATORY: No cough, wheezing, chills or hemoptysis;    No Shortness of Breath  CARDIOVASCULAR: No chest pain, palpitations, dizziness  GASTROINTESTINAL: No abdominal or epigastric pain. No nausea, vomiting, or hematemesis; No diarrhea  No melena or hematochezia.  GENITOURINARY: No dysuria, frequency, hematuria, or incontinence  NEUROLOGICAL: No headaches  SKIN: No itching,  no   rash  LYMPH Nodes: No enlarged glands  ENDOCRINE: No heat or cold intolerance  MUSCULOSKELETAL: No joint pain or swelling  PSYCHIATRIC: No depression, anxiety  HEME/LYMPH: No easy bruising, or bleeding gums  ALLERGY AND IMMUNOLOGIC: No hives or eczema	    MEDICATIONS  (STANDING):  albuterol    90 MICROgram(s) HFA Inhaler 2 Puff(s) Inhalation every 6 hours  albuterol/ipratropium for Nebulization 3 milliLiter(s) Nebulizer every 6 hours  ALPRAZolam 0.5 milliGRAM(s) Oral once  amLODIPine   Tablet 5 milliGRAM(s) Oral daily  aspirin enteric coated 81 milliGRAM(s) Oral daily  atorvastatin 40 milliGRAM(s) Oral at bedtime  buDESOnide    Inhalation Suspension 0.5 milliGRAM(s) Inhalation every 12 hours  busPIRone 20 milliGRAM(s) Oral <User Schedule>  cefTRIAXone   IVPB 1000 milliGRAM(s) IV Intermittent every 24 hours  clotrimazole 1% Cream 1 Application(s) Topical every 12 hours  DULoxetine 60 milliGRAM(s) Oral daily  furosemide    Tablet 40 milliGRAM(s) Oral daily  gabapentin 100 milliGRAM(s) Oral two times a day  guaiFENesin  milliGRAM(s) Oral every 12 hours  heparin   Injectable 5000 Unit(s) SubCutaneous every 12 hours  hydrALAZINE 50 milliGRAM(s) Oral three times a day  influenza  Vaccine (HIGH DOSE) 0.7 milliLiter(s) IntraMuscular once  levothyroxine 125 MICROGram(s) Oral daily  losartan 100 milliGRAM(s) Oral daily  methylPREDNISolone sodium succinate Injectable 20 milliGRAM(s) IV Push every 12 hours  montelukast 10 milliGRAM(s) Oral daily  OLANZapine 10 milliGRAM(s) Oral at bedtime  propranolol 20 milliGRAM(s) Oral two times a day    MEDICATIONS  (PRN):  acetaminophen     Tablet .. 650 milliGRAM(s) Oral every 6 hours PRN Temp greater or equal to 38C (100.4F), Mild Pain (1 - 3)      Vital Signs Last 24 Hrs  T(C): 36.4 (20 Jan 2024 11:16), Max: 36.7 (19 Jan 2024 14:35)  T(F): 97.5 (20 Jan 2024 11:16), Max: 98.1 (19 Jan 2024 14:35)  HR: 70 (20 Jan 2024 11:16) (64 - 85)  BP: 111/71 (20 Jan 2024 11:16) (111/71 - 162/80)  BP(mean): --  RR: 18 (20 Jan 2024 11:16) (18 - 18)  SpO2: 93% (20 Jan 2024 11:16) (92% - 94%)    Parameters below as of 20 Jan 2024 11:16  Patient On (Oxygen Delivery Method): nasal cannula  O2 Flow (L/min): 2    CAPILLARY BLOOD GLUCOSE      POCT Blood Glucose.: 111 mg/dL (19 Jan 2024 15:56)    I&O's Summary    19 Jan 2024 07:01  -  20 Jan 2024 07:00  --------------------------------------------------------  IN: 600 mL / OUT: 750 mL / NET: -150 mL    20 Jan 2024 07:01  -  20 Jan 2024 14:14  --------------------------------------------------------  IN: 480 mL / OUT: 1100 mL / NET: -620 mL          Appearance: Normal	  HEENT:   Normal oral mucosa, PERRL, EOMI	  Lymphatic: No lymphadenopathy  Cardiovascular: Normal S1 S2, No JVD  Respiratory: Lungs clear to auscultation	  Gastrointestinal:  Soft, Non-tender, + BS	  Skin: No rash, No ecchymoses	  Extremities:     LABS:                                  Consultant(s) Notes Reviewed:      Care Discussed with Consultants/Other Providers:

## 2024-01-20 NOTE — PROGRESS NOTE ADULT - ASSESSMENT
Assessment and Recommendation:   Acute hypoxic respiratory Failure on 02 on top of chronic   keep 02 saturation > 87%  Acute COPD exacerbation  H/O graves disease   S/P left nephrectomy for renal cell carcinoma   Morbid obese and NOEMY   bilateral chronic lymphedema  S/P cervical laminectomy and cage placement   wheel chair bound and home bound   chronic pain syndrome   D/C solumedrol 20 mg Q 12 hrs   prednisone 30 mg po daily   Budesonide 0.5 mg BID   Duoneb q 6 hrs   continue Rocephin one gram Q 24 hrs   DVT and GI prophylaxis   discuss with medical PA   spend 40 minutes on consultation and discussion with PCP, medical PA  > 50% counselling the patient

## 2024-01-21 RX ORDER — ALPRAZOLAM 0.25 MG
0.5 TABLET ORAL ONCE
Refills: 0 | Status: DISCONTINUED | OUTPATIENT
Start: 2024-01-21 | End: 2024-01-21

## 2024-01-21 RX ADMIN — Medication 20 MILLIGRAM(S): at 12:17

## 2024-01-21 RX ADMIN — Medication 1 APPLICATION(S): at 18:26

## 2024-01-21 RX ADMIN — GABAPENTIN 100 MILLIGRAM(S): 400 CAPSULE ORAL at 05:20

## 2024-01-21 RX ADMIN — Medication 0.5 MILLIGRAM(S): at 15:32

## 2024-01-21 RX ADMIN — DULOXETINE HYDROCHLORIDE 60 MILLIGRAM(S): 30 CAPSULE, DELAYED RELEASE ORAL at 12:16

## 2024-01-21 RX ADMIN — Medication 3 MILLILITER(S): at 18:26

## 2024-01-21 RX ADMIN — LOSARTAN POTASSIUM 100 MILLIGRAM(S): 100 TABLET, FILM COATED ORAL at 05:21

## 2024-01-21 RX ADMIN — Medication 125 MICROGRAM(S): at 05:21

## 2024-01-21 RX ADMIN — HEPARIN SODIUM 5000 UNIT(S): 5000 INJECTION INTRAVENOUS; SUBCUTANEOUS at 05:22

## 2024-01-21 RX ADMIN — Medication 20 MILLIGRAM(S): at 05:30

## 2024-01-21 RX ADMIN — Medication 0.5 MILLIGRAM(S): at 06:39

## 2024-01-21 RX ADMIN — ATORVASTATIN CALCIUM 40 MILLIGRAM(S): 80 TABLET, FILM COATED ORAL at 21:15

## 2024-01-21 RX ADMIN — GABAPENTIN 100 MILLIGRAM(S): 400 CAPSULE ORAL at 18:26

## 2024-01-21 RX ADMIN — Medication 3 MILLILITER(S): at 05:31

## 2024-01-21 RX ADMIN — Medication 0.5 MILLIGRAM(S): at 18:26

## 2024-01-21 RX ADMIN — HEPARIN SODIUM 5000 UNIT(S): 5000 INJECTION INTRAVENOUS; SUBCUTANEOUS at 18:26

## 2024-01-21 RX ADMIN — AMLODIPINE BESYLATE 5 MILLIGRAM(S): 2.5 TABLET ORAL at 05:21

## 2024-01-21 RX ADMIN — Medication 3 MILLILITER(S): at 12:18

## 2024-01-21 RX ADMIN — OLANZAPINE 10 MILLIGRAM(S): 15 TABLET, FILM COATED ORAL at 21:15

## 2024-01-21 RX ADMIN — Medication 81 MILLIGRAM(S): at 12:16

## 2024-01-21 RX ADMIN — Medication 600 MILLIGRAM(S): at 18:26

## 2024-01-21 RX ADMIN — Medication 50 MILLIGRAM(S): at 14:03

## 2024-01-21 RX ADMIN — Medication 3 MILLILITER(S): at 23:26

## 2024-01-21 RX ADMIN — Medication 1 APPLICATION(S): at 05:23

## 2024-01-21 RX ADMIN — Medication 600 MILLIGRAM(S): at 05:21

## 2024-01-21 RX ADMIN — Medication 50 MILLIGRAM(S): at 05:21

## 2024-01-21 RX ADMIN — MONTELUKAST 10 MILLIGRAM(S): 4 TABLET, CHEWABLE ORAL at 12:17

## 2024-01-21 RX ADMIN — Medication 50 MILLIGRAM(S): at 21:15

## 2024-01-21 RX ADMIN — Medication 40 MILLIGRAM(S): at 05:21

## 2024-01-21 NOTE — PROGRESS NOTE ADULT - SUBJECTIVE AND OBJECTIVE BOX
SHEKHAR VASQUEZ  MRN#: [id number]  Subjective:   pulmonary progress note  : SOB , Acute COPD  exacerbation , hypoxic in acute respiratory Failure , date of service is 2024   73-year-old          history of COPD on 2L NC at home, hyperthyroid, breast CA, asthma, CHF, chronic lymphedema, wheelchair bound,         presenting with difficulty breathing.     pt t has had cough and difficulty breathing for the past couple of days,  then had significant trouble breathing,      was found by EMS to be hypoxic to 68% on 2 L nasal cannula.     pt    was  placed on CPAP by EMS, which improved oxygen saturation., patient is less lethargic on BiPAP over night acceptable 02 saturation , no GI symptoms .no fever still with coughing , mild wheezing  , E coli  UTI, MRSA sepsis       pt  denies chest pain, syncope, fevers. (2024 09:52) on Antibiotics for bacterial pneumonia  C/O pain and coughing , still   SOB  on excretion  , on 2 liter nasal 02 saturation is 95% yesterday note was representing of 2024 service , patient today feeling better will start Budesonide 0.5 mg BID , felling good less wheezing no dizziness , mild coughing To D/C solumedrol , prednisone 30 mg po daily , feeling better no coughing or SOB at rest no chest pain on 02 on Budesonide BID      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      History of carpal tunnel release  right wrist      History of parathyroidectomy      S/P laminectomy  now bed and wheelchair ridden with severe pain          FAMILY HISTORY:  Family history of myocardial infarction  mother  at age 67 and father at age 67 of MI's      OBJECTIVE:  ICU Vital Signs Last 24 Hrs  T(C): 36.9 (2024 11:28), Max: 37.2 (2024 13:58)  T(F): 98.4 (2024 11:28), Max: 99 (2024 13:58)  HR: 83 (2024 11:28) (78 - 93)  BP: 160/78 (2024 11:28) (160/78 - 185/101)  BP(mean): 110 (2024 16:50) (110 - 110)  ABP: --  ABP(mean): --  RR: 20 (2024 11:28) (19 - 20)  SpO2: 93% (2024 11:28) (93% - 99%)    O2 Parameters below as of 2024 11:28  Patient On (Oxygen Delivery Method): nasal cannula w/ humidification             @ 07:01  -   @ 13:53  --------------------------------------------------------  IN: 520 mL / OUT: 1200 mL / NET: -680 mL        PHYSICAL EXAM:Daily   morbidly obese female on 3 liter nasal 02 saturation is 95%  Daily   HEENT:     + NCAT  + EOMI  - Conjuctival edema   - Icterus   - Thrush   - ETT  - NGT/OGT  Neck:         + FROM    + JVD     - Nodes     - Masses    + Mid-line trachea   - Tracheostomy  Chest:       kyphotic deformities   Lungs:          + CTA   + Rhonchi    - Rales    + Wheezing     - Decreased BS   - Dullness R L  Cardiac:       + S1 + S2    + RRR   - Irregular   - S3  - S4    - Murmurs   - Rub   - Hamman’s sign   Abdomen:    + BS     + Soft    + Non-tender     - Distended    - Organomegaly  - PEG  Extremities:   - Cyanosis U/L   - Clubbing  U/L  + LE/UE Edema   + Capillary refill    + Pulses   Neuro:        + Awake   +  Alert   - Confused   - Lethargic   - Sedated   + Generalized Weakness  Skin:        - Rashes    - Erythema   + Normal incisions   + IV sites intact  - Sacral decubit      HOSPITAL MEDICATIONS: All mediciations reviewed and analyzed  MEDICATIONS  (STANDING):  albuterol    90 MICROgram(s) HFA Inhaler 2 Puff(s) Inhalation every 6 hours  albuterol/ipratropium for Nebulization 3 milliLiter(s) Nebulizer every 6 hours  aspirin enteric coated 81 milliGRAM(s) Oral daily  atorvastatin 40 milliGRAM(s) Oral at bedtime  busPIRone 20 milliGRAM(s) Oral <User Schedule>  cefTRIAXone   IVPB      DULoxetine 60 milliGRAM(s) Oral daily  furosemide   Injectable 40 milliGRAM(s) IV Push daily  gabapentin 100 milliGRAM(s) Oral two times a day  guaiFENesin  milliGRAM(s) Oral every 12 hours  heparin   Injectable 5000 Unit(s) SubCutaneous every 12 hours  hydrALAZINE 50 milliGRAM(s) Oral three times a day  influenza  Vaccine (HIGH DOSE) 0.7 milliLiter(s) IntraMuscular once  levothyroxine 125 MICROGram(s) Oral daily  losartan 100 milliGRAM(s) Oral daily  methylPREDNISolone sodium succinate Injectable 20 milliGRAM(s) IV Push three times a day  montelukast 10 milliGRAM(s) Oral daily  OLANZapine 10 milliGRAM(s) Oral at bedtime  propranolol 20 milliGRAM(s) Oral two times a day  tiotropium 2.5 MICROgram(s) Inhaler 2 Puff(s) Inhalation daily    MEDICATIONS  (PRN):    LABS: All Lab data reviewed and analyzed                        12.7   9.72  )-----------( 206      ( 2024 07:37 )             40.3    01-16    140  |  97  |  20  ----------------------------<  159<H>  3.9   |  32<H>  |  0.64    Ca    9.8      2024 07:40      Ca    9.8      2024 07:40                 40.3    Ca    9.9      2024 04:17      Ca    9.9      2024 04:17    TPro  6.9  /  Alb  3.7  /  TBili  1.0  /  DBili  x   /  AST  12  /  ALT  13  /  AlkPhos  104  01-13    PT/INR - ( 2024 01:21 )   PT: 12.2 sec;   INR: 1.17 ratio         PTT - ( 2024 01:21 )  PTT:74.2 sec LIVER FUNCTIONS - ( 2024 01:21 )  Alb: 3.7 g/dL / Pro: 6.9 g/dL / ALK PHOS: 104 U/L / ALT: 13 U/L / AST: 12 U/L / GGT: x           RADIOLOGY: - Reviewed and analyzed

## 2024-01-21 NOTE — PROGRESS NOTE ADULT - ASSESSMENT
_________________________________________________________________________________________  ========>>  M E D I C A L   A T T E N D I N G    F O L L O W  U P  N O T E  <<=========  -----------------------------------------------------------------------------------------------------    - Patient seen and examined by me earlier today.   - In summary,  SHEKHAR VASQUEZ is a 73y year old woman admitted with SOB  - Patient today overall doing ok, comfortable, eating OK. + occasional cough  >>> overall improved , asking about going home    patient has home O2 2L    ==================>> REVIEW OF SYSTEM <<=================    GEN: no fever, no chills, overall doing ok   RESP: no SOB at rest , no cough, no sputum  CVS: no chest pain, no palpitations  GI: no abdominal pain, no nausea, no BM X 2 days : monitoring   : no dysuria, no frequency  Neuro: no headache, no dizziness    ==================>> PHYSICAL EXAM <<=================    GEN: A&O X 3 , NAD , comfortable, pleasant, calm in bed, encouraged out of bed to chair with assistance as needed.   HEENT: NCAT, PERRL, MMM, hearing intact  CVS: S1S2 , regular , No M/R/G appreciated  PULM: CTA B/L,  limited exam due to body habitus.   ABD.: soft. non tender, non distended,  obese   Extrem: intact pulses , ++ chronic lymphedema changes >> to ACE wrap         ( Note written / Date of service 01-21-24 ( This is certified to be the same as "ENTERED" date above ( for billing purposes)))    ==================>> MEDICATIONS <<====================    albuterol    90 MICROgram(s) HFA Inhaler 2 Puff(s) Inhalation every 6 hours  albuterol/ipratropium for Nebulization 3 milliLiter(s) Nebulizer every 6 hours  amLODIPine   Tablet 5 milliGRAM(s) Oral daily  aspirin enteric coated 81 milliGRAM(s) Oral daily  atorvastatin 40 milliGRAM(s) Oral at bedtime  buDESOnide    Inhalation Suspension 0.5 milliGRAM(s) Inhalation every 12 hours  busPIRone 20 milliGRAM(s) Oral <User Schedule>  clotrimazole 1% Cream 1 Application(s) Topical every 12 hours  DULoxetine 60 milliGRAM(s) Oral daily  furosemide    Tablet 40 milliGRAM(s) Oral daily  gabapentin 100 milliGRAM(s) Oral two times a day  guaiFENesin  milliGRAM(s) Oral every 12 hours  heparin   Injectable 5000 Unit(s) SubCutaneous every 12 hours  hydrALAZINE 50 milliGRAM(s) Oral three times a day  influenza  Vaccine (HIGH DOSE) 0.7 milliLiter(s) IntraMuscular once  levothyroxine 125 MICROGram(s) Oral daily  losartan 100 milliGRAM(s) Oral daily  montelukast 10 milliGRAM(s) Oral daily  OLANZapine 10 milliGRAM(s) Oral at bedtime  predniSONE   Tablet 30 milliGRAM(s) Oral daily  propranolol 20 milliGRAM(s) Oral two times a day    MEDICATIONS  (PRN):  acetaminophen     Tablet .. 650 milliGRAM(s) Oral every 6 hours PRN Temp greater or equal to 38C (100.4F), Mild Pain (1 - 3)    ___________  Active diet:  Diet, Regular  ___________________    ==================>> VITAL SIGNS <<==================    Vital Signs Last 24 HrsT(C): 36.4 (01-21-24 @ 12:07)  T(F): 97.5 (01-21-24 @ 12:07), Max: 97.5 (01-21-24 @ 04:45)  HR: 87 (01-21-24 @ 14:05) (59 - 88)  BP: 137/71 (01-21-24 @ 14:05)  RR: 18 (01-21-24 @ 12:07) (18 - 18)  SpO2: 95% (01-21-24 @ 12:07) (94% - 97%)      CAPILLARY BLOOD GLUCOSE         ==================>> LAB AND IMAGING <<==================             WBC count:    Hemoglobin:    platelets:  206 <==    Creatinine:  0.64  <<==  Sodium:   140  <==       AST:          12(01-13) <==      ALT:        13(01-13)  <==      AP:        104(01-13)  <=     Bili:        1.0(01-13)  <=    ____________________________    M I C R O B I O L O G Y :    Culture - Blood (collected 14 Jan 2024 04:46)  Source: .Blood Blood-Peripheral  Final Report (19 Jan 2024 09:01):    No growth at 5 days    Culture - Blood (collected 14 Jan 2024 04:46)  Source: .Blood Blood-Peripheral  Final Report (19 Jan 2024 09:01):    No growth at 5 days    Culture - Urine (collected 13 Jan 2024 02:08)  Source: Clean Catch Clean Catch (Midstream)  Final Report (16 Jan 2024 16:21):    >100,000 CFU/ml Escherichia coli ESBL  Organism: Escherichia coli ESBL (16 Jan 2024 16:21)  Organism: Escherichia coli ESBL (16 Jan 2024 16:21)    Sensitivities:      Method Type: MARIA M      -  Amoxicillin/Clavulanic Acid: S <=8/4      -  Ampicillin: R >16 These ampicillin results predict results for amoxicillin      -  Ampicillin/Sulbactam: I 16/8      -  Aztreonam: R 16      -  Cefazolin: R >16 For uncomplicated UTI with K. pneumoniae, E. coli, or P. mirablis: MARIA M <=16 is sensitive and MARIA M >=32 is resistant. This also predicts results for oral agents cefaclor, cefdinir, cefpodoxime, cefprozil, cefuroxime axetil, cephalexin and locarbef for uncomplicated UTI. Note that some isolates may be susceptible to these agents while testing resistant to cefazolin.      -  Cefepime: R 8      -  Ceftriaxone: R >32      -  Cefuroxime: R >16      -  Ciprofloxacin: R >2      -  Ertapenem: S <=0.5      -  Gentamicin: S <=2      -  Imipenem: S <=1      -  Levofloxacin: R >4      -  Meropenem: S <=1      -  Nitrofurantoin: S <=32 Should not be used to treat pyelonephritis      -  Piperacillin/Tazobactam: S <=8      -  Tobramycin: S <=2      -  Trimethoprim/Sulfamethoxazole: R >2/38    Culture - Blood (collected 13 Jan 2024 00:45)  Source: .Blood Blood-Peripheral  Gram Stain (13 Jan 2024 23:35):    Growth in aerobic bottle: Gram Positive Cocci in Clusters  Final Report (14 Jan 2024 21:03):    Growth in aerobic bottle: Staphylococcus epidermidis    Isolation of Coagulase negative Staphylococcus from single blood culture    sets may represent    contamination. Contact the Microbiology Department at [phone] if    susceptibility testing is    clinically indicated.    Culture - Blood (collected 13 Jan 2024 00:30)  Source: .Blood Blood-Peripheral  Gram Stain (13 Jan 2024 20:50):    Growth in aerobic bottle: Gram Positive Cocci in Clusters  Final Report (15 Alan 2024 17:47):    Growth in aerobic bottle: Staphylococcus hominis "Susceptibilities not    performed"    Growth in aerobic bottle: Staphylococcus epidermidis    Direct identification is available within approximately 3-5    hours either by Blood Panel Multiplexed PCR or Direct    MALDI-TOF. Details: [email or web address]  Organism: Blood Culture PCR  Staphylococcus epidermidis (15 Alan 2024 17:47)  Organism: Staphylococcus epidermidis (15 Alan 2024 17:47)    Sensitivities:      Method Type: MARIA M      -  Ampicillin/Sulbactam: R <=8/4      -  Cefazolin: R <=4      -  Clindamycin: S <=0.25      -  Erythromycin: S <=0.25      -  Gentamicin: S <=1 Should not be used as monotherapy      -  Oxacillin: R >2      -  Penicillin: R 0.5      -  Rifampin: S <=1 Should not be used as monotherapy      -  Tetracycline: S <=1      -  Trimethoprim/Sulfamethoxazole: R >2/38      -  Vancomycin: S 1  Organism: Blood Culture PCR (15 Alan 2024 17:47)    Sensitivities:      Method Type: PCR      -  Staphylococcus epidermidis, Methicillin resistant: Detec        SARS-CoV-2: NotDetec (01-13-24 @ 01:48)      < from: TTE W or WO Ultrasound Enhancing Agent (01.18.24 @ 14:52) >  CONCLUSIONS:     1. Left ventricular cavity is small. There are no regional wall motion abnormalities seen.   2. The right ventricle is not well visualized.   3. No pericardial effusion seen.   4. Compared to the transthoracic echocardiogram performed on 6/25/2023, there have been no significant interval changes.   5. Left ventricular endocardium is not well visualized; however, the left ventricular systolic function appears grossly normal.   6. Technically difficult image quality.  < end of copied text >      LE Duplex noted, no DVT on visualized veins     ___________________________________________________________________________________  ===============>>  A S S E S S M E N T   A N D   P L A N <<===============  ------------------------------------------------------------------------------------------    · Assessment	   73-year-old woman with h/o  COPD on 2L NC at home, hypothyroid,  breast CA, asthma, chronic lymphedema, wheelchair bound, obesity       presented with difficulty breathing./   had cough and difficulty breathing for the past couple of days,        then had significant trouble breathing, . had  worsened     was found by EMS to be hypoxic to 68% on 2 L nasal cannula.  /  was  placed on CPAP by EMS, which improved oxygen sat/ /  denies chest pain, syncope, fevers.      admitted wth sob, + HMPV    acute  copd  exacerbation.  on home oxygen        on  steroid /  Proventil   spiriva         CT showing pneumonia         MRSA staph epi Bacteremia > ? contamination  >>> ID appreciated          UTI on abx  Pulm and ID following, appreciated  on antibiotics for possible pneumonia / CAP >> Can change to oral antibiotics on discharge      h/o   acute on  c/c diastolic  chf    on iv lasix, per  card       HTN.  HLD    Continue Current medications otherwise and monitor.    adjust medications as needed per cardio     h/o esophageal   dysmotility   c/c  lymphedema       continue current management    PT / OOB as able ( patient at baseline not very active)      DC planing home Likely tomorrow on oral steroids     home care being set up as well / home health aides    --------------------------------------------  Case discussed with patient, , Nurse Practitioner  Education given on findings and plan of care  ___________________________  H. JANICE Welch.  Pager: [phone]       _________________________________________________________________________________________  ========>>  M E D I C A L   A T T E N D I N G    F O L L O W  U P  N O T E  <<=========  -----------------------------------------------------------------------------------------------------    - Patient seen and examined by me earlier today.   - Patient Seemed very anxious: as per patient just received [facility] hospital awaiting setup of home care home health aides     ==================>> REVIEW OF SYSTEM <<=================    GEN: no fever, no chills, overall doing ok   RESP: no SOB at rest , no cough, no sputum  CVS: no chest pain, no palpitations  GI: no abdominal pain, no nausea, no BM X 2 days : monitoring   : no dysuria, no frequency  Neuro: no headache, no dizziness    ==================>> PHYSICAL EXAM <<=================    GEN: A&O X 3 , NAD , comfortable, pleasant, calm in bed, encouraged out of bed to chair with assistance as needed.   HEENT: NCAT, PERRL, MMM, hearing intact  CVS: S1S2 , regular , No M/R/G appreciated  PULM: CTA B/L,  limited exam due to body habitus.   ABD.: soft. non tender, non distended,  obese   Extrem: intact pulses , ++ chronic lymphedema changes >> ACE wrap         ( Note written / Date of service 01-21-24 ( This is certified to be the same as "ENTERED" date above ( for billing purposes)))    ==================>> MEDICATIONS <<====================    albuterol    90 MICROgram(s) HFA Inhaler 2 Puff(s) Inhalation every 6 hours  albuterol/ipratropium for Nebulization 3 milliLiter(s) Nebulizer every 6 hours  amLODIPine   Tablet 5 milliGRAM(s) Oral daily  aspirin enteric coated 81 milliGRAM(s) Oral daily  atorvastatin 40 milliGRAM(s) Oral at bedtime  buDESOnide    Inhalation Suspension 0.5 milliGRAM(s) Inhalation every 12 hours  busPIRone 20 milliGRAM(s) Oral <User Schedule>  clotrimazole 1% Cream 1 Application(s) Topical every 12 hours  DULoxetine 60 milliGRAM(s) Oral daily  furosemide    Tablet 40 milliGRAM(s) Oral daily  gabapentin 100 milliGRAM(s) Oral two times a day  guaiFENesin  milliGRAM(s) Oral every 12 hours  heparin   Injectable 5000 Unit(s) SubCutaneous every 12 hours  hydrALAZINE 50 milliGRAM(s) Oral three times a day  influenza  Vaccine (HIGH DOSE) 0.7 milliLiter(s) IntraMuscular once  levothyroxine 125 MICROGram(s) Oral daily  losartan 100 milliGRAM(s) Oral daily  montelukast 10 milliGRAM(s) Oral daily  OLANZapine 10 milliGRAM(s) Oral at bedtime  predniSONE   Tablet 30 milliGRAM(s) Oral daily  propranolol 20 milliGRAM(s) Oral two times a day    MEDICATIONS  (PRN):  acetaminophen     Tablet .. 650 milliGRAM(s) Oral every 6 hours PRN Temp greater or equal to 38C (100.4F), Mild Pain (1 - 3)    ___________  Active diet:  Diet, Regular  ___________________    ==================>> VITAL SIGNS <<==================    Vital Signs Last 24 HrsT(C): 36.4 (01-21-24 @ 12:07)  T(F): 97.5 (01-21-24 @ 12:07), Max: 97.5 (01-21-24 @ 04:45)  HR: 87 (01-21-24 @ 14:05) (59 - 88)  BP: 137/71 (01-21-24 @ 14:05)  RR: 18 (01-21-24 @ 12:07) (18 - 18)  SpO2: 95% (01-21-24 @ 12:07) (94% - 97%)         ==================>> LAB AND IMAGING <<==================       No labs today    ____________________________    M I C R O B I O L O G Y :    Culture - Blood (collected 14 Jan 2024 04:46)  Source: .Blood Blood-Peripheral  Final Report (19 Jan 2024 09:01):    No growth at 5 days    Culture - Blood (collected 14 Jan 2024 04:46)  Source: .Blood Blood-Peripheral  Final Report (19 Jan 2024 09:01):    No growth at 5 days    Culture - Urine (collected 13 Jan 2024 02:08)  Source: Clean Catch Clean Catch (Midstream)  Final Report (16 Jan 2024 16:21):    >100,000 CFU/ml Escherichia coli ESBL  Organism: Escherichia coli ESBL (16 Jan 2024 16:21)  Organism: Escherichia coli ESBL (16 Jan 2024 16:21)    Sensitivities:      Method Type: MARIA M      -  Amoxicillin/Clavulanic Acid: S <=8/4      -  Ampicillin: R >16 These ampicillin results predict results for amoxicillin      -  Ampicillin/Sulbactam: I 16/8      -  Aztreonam: R 16      -  Cefazolin: R >16 For uncomplicated UTI with K. pneumoniae, E. coli, or P. mirablis: MARIA M <=16 is sensitive and MARIA M >=32 is resistant. This also predicts results for oral agents cefaclor, cefdinir, cefpodoxime, cefprozil, cefuroxime axetil, cephalexin and locarbef for uncomplicated UTI. Note that some isolates may be susceptible to these agents while testing resistant to cefazolin.      -  Cefepime: R 8      -  Ceftriaxone: R >32      -  Cefuroxime: R >16      -  Ciprofloxacin: R >2      -  Ertapenem: S <=0.5      -  Gentamicin: S <=2      -  Imipenem: S <=1      -  Levofloxacin: R >4      -  Meropenem: S <=1      -  Nitrofurantoin: S <=32 Should not be used to treat pyelonephritis      -  Piperacillin/Tazobactam: S <=8      -  Tobramycin: S <=2      -  Trimethoprim/Sulfamethoxazole: R >2/38    Culture - Blood (collected 13 Jan 2024 00:45)  Source: .Blood Blood-Peripheral  Gram Stain (13 Jan 2024 23:35):    Growth in aerobic bottle: Gram Positive Cocci in Clusters  Final Report (14 Jan 2024 21:03):    Growth in aerobic bottle: Staphylococcus epidermidis    Isolation of Coagulase negative Staphylococcus from single blood culture    sets may represent    contamination. Contact the Microbiology Department at [phone] if    susceptibility testing is    clinically indicated.    Culture - Blood (collected 13 Jan 2024 00:30)  Source: .Blood Blood-Peripheral  Gram Stain (13 Jan 2024 20:50):    Growth in aerobic bottle: Gram Positive Cocci in Clusters  Final Report (15 Alan 2024 17:47):    Growth in aerobic bottle: Staphylococcus hominis "Susceptibilities not    performed"    Growth in aerobic bottle: Staphylococcus epidermidis    Direct identification is available within approximately 3-5    hours either by Blood Panel Multiplexed PCR or Direct    MALDI-TOF. Details: [email or web address]  Organism: Blood Culture PCR  Staphylococcus epidermidis (15 Alan 2024 17:47)  Organism: Staphylococcus epidermidis (15 Alan 2024 17:47)    Sensitivities:      Method Type: MARIA M      -  Ampicillin/Sulbactam: R <=8/4      -  Cefazolin: R <=4      -  Clindamycin: S <=0.25      -  Erythromycin: S <=0.25      -  Gentamicin: S <=1 Should not be used as monotherapy      -  Oxacillin: R >2      -  Penicillin: R 0.5      -  Rifampin: S <=1 Should not be used as monotherapy      -  Tetracycline: S <=1      -  Trimethoprim/Sulfamethoxazole: R >2/38      -  Vancomycin: S 1  Organism: Blood Culture PCR (15 Alan 2024 17:47)    Sensitivities:      Method Type: PCR      -  Staphylococcus epidermidis, Methicillin resistant: Detec      < from: TTE W or WO Ultrasound Enhancing Agent (01.18.24 @ 14:52) >  CONCLUSIONS:     1. Left ventricular cavity is small. There are no regional wall motion abnormalities seen.   2. The right ventricle is not well visualized.   3. No pericardial effusion seen.   4. Compared to the transthoracic echocardiogram performed on 6/25/2023, there have been no significant interval changes.   5. Left ventricular endocardium is not well visualized; however, the left ventricular systolic function appears grossly normal.   6. Technically difficult image quality.  < end of copied text >      LE Duplex noted, no DVT on visualized veins     ___________________________________________________________________________________  ===============>>  A S S E S S M E N T   A N D   P L A N <<===============  ------------------------------------------------------------------------------------------    · Assessment	   73-year-old woman with h/o  COPD on 2L NC at home, hypothyroid,  breast CA, asthma, chronic lymphedema, wheelchair bound, obesity       presented with difficulty breathing./   had cough and difficulty breathing for the past couple of days,        then had significant trouble breathing, . had  worsened     was found by EMS to be hypoxic to 68% on 2 L nasal cannula.  /  was  placed on CPAP by EMS, which improved oxygen sat/ /  denies chest pain, syncope, fevers.      admitted wth sob, + HMPV    acute  copd  exacerbation.  on home oxygen        on  steroid /  Proventil   spiriva         CT showing pneumonia         MRSA staph epi Bacteremia > ? contamination  >>> ID appreciated          UTI on abx  Pulm and ID following, appreciated  on antibiotics for possible pneumonia / CAP >> Can change to oral antibiotics on discharge... Can change to oral steroids on Discharge      h/o   acute on  c/c diastolic  chf    on iv lasix, per  card       HTN.  HLD    Continue Current medications otherwise and monitor.    adjust medications as needed per cardio     h/o esophageal   dysmotility   c/c  lymphedema       continue current management    PT / OOB as able ( patient at baseline not very active)      DC planing home on oral steroids     home care being set up as well / home health aides    --------------------------------------------  Case discussed with patient, Nurse Practitioner  Education given on findings and plan of care  ___________________________  HRenetta Welch D.O.  Pager: [phone]

## 2024-01-21 NOTE — PROGRESS NOTE ADULT - ASSESSMENT
Assessment and Recommendation:   Acute hypoxic respiratory Failure on 02 on top of chronic on home 02  keep 02 saturation > 87%  Acute COPD exacerbation  H/O graves disease   S/P left nephrectomy for renal cell carcinoma   Morbid obese and NOEMY   bilateral chronic lymphedema  S/P cervical laminectomy and cage placement   wheel chair bound and home bound   chronic pain syndrome   prednisone 30 mg po daily   Budesonide 0.5 mg BID   Duoneb q 6 hrs   D/C  Rocephin   DVT and GI prophylaxis   D/C planning   discuss with medical PA   spend 40 minutes on consultation and discussion with PCP, medical PA  > 50% counselling the patient

## 2024-01-22 VITALS
OXYGEN SATURATION: 94 % | DIASTOLIC BLOOD PRESSURE: 55 MMHG | TEMPERATURE: 98 F | RESPIRATION RATE: 18 BRPM | HEART RATE: 83 BPM | SYSTOLIC BLOOD PRESSURE: 100 MMHG

## 2024-01-22 PROCEDURE — 82962 GLUCOSE BLOOD TEST: CPT

## 2024-01-22 PROCEDURE — 97110 THERAPEUTIC EXERCISES: CPT

## 2024-01-22 PROCEDURE — 83605 ASSAY OF LACTIC ACID: CPT

## 2024-01-22 PROCEDURE — 97530 THERAPEUTIC ACTIVITIES: CPT

## 2024-01-22 PROCEDURE — 82435 ASSAY OF BLOOD CHLORIDE: CPT

## 2024-01-22 PROCEDURE — 84132 ASSAY OF SERUM POTASSIUM: CPT

## 2024-01-22 PROCEDURE — 85610 PROTHROMBIN TIME: CPT

## 2024-01-22 PROCEDURE — 96374 THER/PROPH/DIAG INJ IV PUSH: CPT

## 2024-01-22 PROCEDURE — 83880 ASSAY OF NATRIURETIC PEPTIDE: CPT

## 2024-01-22 PROCEDURE — 93970 EXTREMITY STUDY: CPT

## 2024-01-22 PROCEDURE — 87040 BLOOD CULTURE FOR BACTERIA: CPT

## 2024-01-22 PROCEDURE — 94640 AIRWAY INHALATION TREATMENT: CPT

## 2024-01-22 PROCEDURE — 84295 ASSAY OF SERUM SODIUM: CPT

## 2024-01-22 PROCEDURE — 94660 CPAP INITIATION&MGMT: CPT

## 2024-01-22 PROCEDURE — 71250 CT THORAX DX C-: CPT

## 2024-01-22 PROCEDURE — 84145 PROCALCITONIN (PCT): CPT

## 2024-01-22 PROCEDURE — 99291 CRITICAL CARE FIRST HOUR: CPT

## 2024-01-22 PROCEDURE — 85018 HEMOGLOBIN: CPT

## 2024-01-22 PROCEDURE — 87086 URINE CULTURE/COLONY COUNT: CPT

## 2024-01-22 PROCEDURE — 82947 ASSAY GLUCOSE BLOOD QUANT: CPT

## 2024-01-22 PROCEDURE — 97161 PT EVAL LOW COMPLEX 20 MIN: CPT

## 2024-01-22 PROCEDURE — 81001 URINALYSIS AUTO W/SCOPE: CPT

## 2024-01-22 PROCEDURE — 36415 COLL VENOUS BLD VENIPUNCTURE: CPT

## 2024-01-22 PROCEDURE — 80053 COMPREHEN METABOLIC PANEL: CPT

## 2024-01-22 PROCEDURE — C8929: CPT

## 2024-01-22 PROCEDURE — 87186 SC STD MICRODIL/AGAR DIL: CPT

## 2024-01-22 PROCEDURE — 87150 DNA/RNA AMPLIFIED PROBE: CPT

## 2024-01-22 PROCEDURE — 85730 THROMBOPLASTIN TIME PARTIAL: CPT

## 2024-01-22 PROCEDURE — 82330 ASSAY OF CALCIUM: CPT

## 2024-01-22 PROCEDURE — 87077 CULTURE AEROBIC IDENTIFY: CPT

## 2024-01-22 PROCEDURE — 85014 HEMATOCRIT: CPT

## 2024-01-22 PROCEDURE — 96375 TX/PRO/DX INJ NEW DRUG ADDON: CPT

## 2024-01-22 PROCEDURE — 84484 ASSAY OF TROPONIN QUANT: CPT

## 2024-01-22 PROCEDURE — 82803 BLOOD GASES ANY COMBINATION: CPT

## 2024-01-22 PROCEDURE — 71045 X-RAY EXAM CHEST 1 VIEW: CPT

## 2024-01-22 PROCEDURE — 80048 BASIC METABOLIC PNL TOTAL CA: CPT

## 2024-01-22 PROCEDURE — 0225U NFCT DS DNA&RNA 21 SARSCOV2: CPT

## 2024-01-22 PROCEDURE — 85025 COMPLETE CBC W/AUTO DIFF WBC: CPT

## 2024-01-22 RX ORDER — IPRATROPIUM/ALBUTEROL SULFATE 18-103MCG
3 AEROSOL WITH ADAPTER (GRAM) INHALATION
Qty: 80 | Refills: 0
Start: 2024-01-22 | End: 2024-02-10

## 2024-01-22 RX ORDER — GABAPENTIN 800 MG/1
1 TABLET ORAL
Qty: 60 | Refills: 0 | DISCHARGE
Start: 2024-01-22 | End: 2024-02-20

## 2024-01-22 RX ORDER — BUDESONIDE, MICRONIZED 100 %
2 POWDER (GRAM) MISCELLANEOUS
Qty: 60 | Refills: 0
Start: 2024-01-22 | End: 2024-02-20

## 2024-01-22 RX ORDER — GABAPENTIN 400 MG/1
1 CAPSULE ORAL
Qty: 60 | Refills: 0
Start: 2024-01-22 | End: 2024-02-20

## 2024-01-22 RX ORDER — ALPRAZOLAM 0.25 MG
0.5 TABLET ORAL ONCE
Refills: 0 | Status: DISCONTINUED | OUTPATIENT
Start: 2024-01-22 | End: 2024-01-22

## 2024-01-22 RX ORDER — IPRATROPIUM/ALBUTEROL SULFATE 18-103MCG
3 AEROSOL WITH ADAPTER (GRAM) INHALATION
Qty: 240 | Refills: 0
Start: 2024-01-22 | End: 2024-02-10

## 2024-01-22 RX ADMIN — MONTELUKAST 10 MILLIGRAM(S): 4 TABLET, CHEWABLE ORAL at 13:23

## 2024-01-22 RX ADMIN — Medication 40 MILLIGRAM(S): at 05:35

## 2024-01-22 RX ADMIN — Medication 20 MILLIGRAM(S): at 13:24

## 2024-01-22 RX ADMIN — AMLODIPINE BESYLATE 5 MILLIGRAM(S): 2.5 TABLET ORAL at 05:35

## 2024-01-22 RX ADMIN — Medication 50 MILLIGRAM(S): at 05:34

## 2024-01-22 RX ADMIN — GABAPENTIN 100 MILLIGRAM(S): 400 CAPSULE ORAL at 05:34

## 2024-01-22 RX ADMIN — Medication 0.5 MILLIGRAM(S): at 09:20

## 2024-01-22 RX ADMIN — Medication 81 MILLIGRAM(S): at 13:23

## 2024-01-22 RX ADMIN — LOSARTAN POTASSIUM 100 MILLIGRAM(S): 100 TABLET, FILM COATED ORAL at 05:33

## 2024-01-22 RX ADMIN — Medication 600 MILLIGRAM(S): at 05:35

## 2024-01-22 RX ADMIN — Medication 3 MILLILITER(S): at 13:51

## 2024-01-22 RX ADMIN — Medication 3 MILLILITER(S): at 05:38

## 2024-01-22 RX ADMIN — Medication 0.5 MILLIGRAM(S): at 05:38

## 2024-01-22 RX ADMIN — DULOXETINE HYDROCHLORIDE 60 MILLIGRAM(S): 30 CAPSULE, DELAYED RELEASE ORAL at 13:24

## 2024-01-22 RX ADMIN — HEPARIN SODIUM 5000 UNIT(S): 5000 INJECTION INTRAVENOUS; SUBCUTANEOUS at 05:39

## 2024-01-22 RX ADMIN — Medication 30 MILLIGRAM(S): at 05:34

## 2024-01-22 RX ADMIN — Medication 1 APPLICATION(S): at 05:39

## 2024-01-22 RX ADMIN — Medication 125 MICROGRAM(S): at 05:34

## 2024-01-22 NOTE — PROGRESS NOTE ADULT - ASSESSMENT
M E D I C A L   A T T E N D I N G    F O L L O W    U P   N O T E  (01-22-24 )                                     ------------------------------------------------------------------------------------------------    patient evaluated by me, case discussed with team, chart, medications, and physical exam reviewed, labs / tests  and vitals reviewed by me, as bellow.   Patient is stable for discharge today. going home with home care..   Patient to follow up with  PMD / pulm..   See discharge document for full note (discussed with ACP).  [Greater than 35 min spent for these services. ]          ( Note written / Date of service 01-22-24 ( This is certified to be the same as "ENTERED" date above ( for billing purposes)))    ==================>> MEDICATIONS <<====================    albuterol    90 MICROgram(s) HFA Inhaler 2 Puff(s) Inhalation every 6 hours  albuterol/ipratropium for Nebulization 3 milliLiter(s) Nebulizer every 6 hours  amLODIPine   Tablet 5 milliGRAM(s) Oral daily  aspirin enteric coated 81 milliGRAM(s) Oral daily  atorvastatin 40 milliGRAM(s) Oral at bedtime  buDESOnide    Inhalation Suspension 0.5 milliGRAM(s) Inhalation every 12 hours  busPIRone 20 milliGRAM(s) Oral <User Schedule>  clotrimazole 1% Cream 1 Application(s) Topical every 12 hours  DULoxetine 60 milliGRAM(s) Oral daily  furosemide    Tablet 40 milliGRAM(s) Oral daily  gabapentin 100 milliGRAM(s) Oral two times a day  guaiFENesin  milliGRAM(s) Oral every 12 hours  heparin   Injectable 5000 Unit(s) SubCutaneous every 12 hours  hydrALAZINE 50 milliGRAM(s) Oral three times a day  influenza  Vaccine (HIGH DOSE) 0.7 milliLiter(s) IntraMuscular once  levothyroxine 125 MICROGram(s) Oral daily  losartan 100 milliGRAM(s) Oral daily  montelukast 10 milliGRAM(s) Oral daily  OLANZapine 10 milliGRAM(s) Oral at bedtime  predniSONE   Tablet 30 milliGRAM(s) Oral daily  propranolol 20 milliGRAM(s) Oral two times a day    MEDICATIONS  (PRN):  acetaminophen     Tablet .. 650 milliGRAM(s) Oral every 6 hours PRN Temp greater or equal to 38C (100.4F), Mild Pain (1 - 3)    ___________  Active diet:  Diet, Regular  ___________________    ==================>> VITAL SIGNS <<==================    T(C): 36.3 (01-22-24 @ 11:01), Max: 36.8 (01-21-24 @ 20:14)  HR: 76 (01-22-24 @ 11:01) (53 - 87)  BP: 100/63 (01-22-24 @ 11:01) (100/63 - 138/75)  BP(mean): --  RR: 18 (01-22-24 @ 11:01) (18 - 18)  SpO2: 94% (01-22-24 @ 11:01) (94% - 97%)       I&O's Summary    21 Jan 2024 07:01  -  22 Jan 2024 07:00  --------------------------------------------------------  IN: 425 mL / OUT: 850 mL / NET: -425 mL    22 Jan 2024 07:01  -  22 Jan 2024 12:34  --------------------------------------------------------  IN: 344 mL / OUT: 0 mL / NET: 344 mL     ==================>> LAB AND IMAGING <<==================       no labs today        ( Note written / Date of service 01-22-24 ( This is certified to be the same as "ENTERED" date above ( for billing Purposes )))

## 2024-01-22 NOTE — PROGRESS NOTE ADULT - PROVIDER SPECIALTY LIST ADULT
Cardiology
Infectious Disease
Internal Medicine
Pulmonology
Pulmonology
Cardiology
Internal Medicine
Pulmonology
Pulmonology
Cardiology
Cardiology
Internal Medicine
Pulmonology
Pulmonology
Internal Medicine
Pulmonology
Cardiology
Internal Medicine

## 2024-01-22 NOTE — PROGRESS NOTE ADULT - SUBJECTIVE AND OBJECTIVE BOX
Cardiovascular Disease Progress Note    Overnight events: No acute events overnight.  no new cardiac sx  Otherwise review of systems negative    Objective Findings:  T(C): 36.1 (24 @ 04:45), Max: 36.8 (24 @ 20:14)  HR: 53 (24 @ 04:45) (53 - 88)  BP: 119/71 (24 @ 04:45) (107/70 - 147/65)  RR: 18 (24 @ 04:45) (18 - 18)  SpO2: 97% (24 @ 04:45) (95% - 97%)  Wt(kg): --  Daily     Daily Weight in k.3 (2024 06:08)      Physical Exam:  Gen: NAD  HEENT: EOMI  CV: RRR, normal S1 + S2, no m/r/g  Lungs: CTAB  Abd: soft, non-tender  Ext: No edema    Telemetry:    Laboratory Data:                    Inpatient Medications:  MEDICATIONS  (STANDING):  albuterol    90 MICROgram(s) HFA Inhaler 2 Puff(s) Inhalation every 6 hours  albuterol/ipratropium for Nebulization 3 milliLiter(s) Nebulizer every 6 hours  amLODIPine   Tablet 5 milliGRAM(s) Oral daily  aspirin enteric coated 81 milliGRAM(s) Oral daily  atorvastatin 40 milliGRAM(s) Oral at bedtime  buDESOnide    Inhalation Suspension 0.5 milliGRAM(s) Inhalation every 12 hours  busPIRone 20 milliGRAM(s) Oral <User Schedule>  clotrimazole 1% Cream 1 Application(s) Topical every 12 hours  DULoxetine 60 milliGRAM(s) Oral daily  furosemide    Tablet 40 milliGRAM(s) Oral daily  gabapentin 100 milliGRAM(s) Oral two times a day  guaiFENesin  milliGRAM(s) Oral every 12 hours  heparin   Injectable 5000 Unit(s) SubCutaneous every 12 hours  hydrALAZINE 50 milliGRAM(s) Oral three times a day  influenza  Vaccine (HIGH DOSE) 0.7 milliLiter(s) IntraMuscular once  levothyroxine 125 MICROGram(s) Oral daily  losartan 100 milliGRAM(s) Oral daily  montelukast 10 milliGRAM(s) Oral daily  OLANZapine 10 milliGRAM(s) Oral at bedtime  predniSONE   Tablet 30 milliGRAM(s) Oral daily  propranolol 20 milliGRAM(s) Oral two times a day      Assessment:  73-year-old history of COPD on 2L NC at home, hyperthyroid, breast CA, asthma, CHF, chronic lymphedema, wheelchair bound, presenting with difficulty breathing.  Patient has had cough and difficulty breathing for the past couple of days, today had significant trouble breathing, was found by EMS to be hypoxic to 68% on 2 L nasal cannula.  Patient placed on CPAP by EMS, which improved oxygen saturation.  Patient denies chest pain, syncope, fevers.    Recs:  cardiac stable  dyspnea likely in setting of hmpv and bacterial pneumonia, resolving  TTE (tds) --> wnl  cw po lasix and ACE wraps to legs  pulmonary consulted, recs appreciated; steroids and bronchodilators per pulmonary  abx per ID  pos bcx, likely contaminant per ID  supplemental o2, wean as able. resp status improving  dvt ppx  dcp once home services reinstated           Over 25 minutes spent on total encounter; more than 50% of the visit was spent counseling and/or coordinating care by the attending physician.      Juan Salcedo MD   Cardiovascular Disease  [phone]

## 2024-01-22 NOTE — PROGRESS NOTE ADULT - SUBJECTIVE AND OBJECTIVE BOX
SHEKHAR VASQUEZ  MRN#: [id number]  Subjective:   pulmonary progress note  : SOB , Acute COPD  exacerbation , hypoxic in acute respiratory Failure , date of service is 2024   73-year-old          history of COPD on 2L NC at home, hyperthyroid, breast CA, asthma, CHF, chronic lymphedema, wheelchair bound,         presenting with difficulty breathing.     pt t has had cough and difficulty breathing for the past couple of days,  then had significant trouble breathing,      was found by EMS to be hypoxic to 68% on 2 L nasal cannula.     pt    was  placed on CPAP by EMS, which improved oxygen saturation., patient is less lethargic on BiPAP over night acceptable 02 saturation , no GI symptoms .no fever still with coughing , mild wheezing  , E coli  UTI, MRSA sepsis       pt  denies chest pain, syncope, fevers. (2024 09:52) on Antibiotics for bacterial pneumonia  C/O pain and coughing , still   SOB  on excretion  , on 2 liter nasal 02 saturation is 95% yesterday note was representing of 2024 service , patient today feeling better will start Budesonide 0.5 mg BID , felling good less wheezing no dizziness , mild coughing To D/C solumedrol , prednisone 30 mg po daily , feeling better no coughing or SOB at rest no chest pain on 02 on Budesonide BID  , patient today is comfortable less coughing .breathing is base line no sputum production will D/C on taper prednisone nebulizer treatement and Budesonide 0.5 mg  BID by nebulizer     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      History of carpal tunnel release  right wrist      History of parathyroidectomy      S/P laminectomy  now bed and wheelchair ridden with severe pain          FAMILY HISTORY:  Family history of myocardial infarction  mother  at age 67 and father at age 67 of MI's      OBJECTIVE:  ICU Vital Signs Last 24 Hrs  T(C): 36.9 (2024 11:28), Max: 37.2 (2024 13:58)  T(F): 98.4 (2024 11:28), Max: 99 (2024 13:58)  HR: 83 (2024 11:28) (78 - 93)  BP: 160/78 (2024 11:28) (160/78 - 185/101)  BP(mean): 110 (2024 16:50) (110 - 110)  ABP: --  ABP(mean): --  RR: 20 (2024 11:28) (19 - 20)  SpO2: 93% (2024 11:28) (93% - 99%)    O2 Parameters below as of 2024 11:28  Patient On (Oxygen Delivery Method): nasal cannula w/ humidification             @ 07:01  -   @ 13:53  --------------------------------------------------------  IN: 520 mL / OUT: 1200 mL / NET: -680 mL        PHYSICAL EXAM :Daily   morbidly obese female on 3 liter nasal 02 saturation is 95%  Daily   HEENT:     + NCAT  + EOMI  - Conjuctival edema   - Icterus   - Thrush   - ETT  - NGT/OGT  Neck:         + FROM    + JVD     - Nodes     - Masses    + Mid-line trachea   - Tracheostomy  Chest:       kyphotic deformities   Lungs:          + CTA   + Rhonchi    - Rales    - Wheezing     - Decreased BS   - Dullness R L  Cardiac:       + S1 + S2    + RRR   - Irregular   - S3  - S4    - Murmurs   - Rub   - Hamman’s sign   Abdomen:    + BS     + Soft    + Non-tender     - Distended    - Organomegaly  - PEG  Extremities:   - Cyanosis U/L   - Clubbing  U/L  + LE/UE Edema   + Capillary refill    + Pulses   Neuro:        + Awake   +  Alert   - Confused   - Lethargic   - Sedated   + Generalized Weakness  Skin:        - Rashes    - Erythema   + Normal incisions   + IV sites intact  - Sacral decubit      HOSPITAL MEDICATIONS: All mediciations reviewed and analyzed  MEDICATIONS  (STANDING):  albuterol    90 MICROgram(s) HFA Inhaler 2 Puff(s) Inhalation every 6 hours  albuterol/ipratropium for Nebulization 3 milliLiter(s) Nebulizer every 6 hours  aspirin enteric coated 81 milliGRAM(s) Oral daily  atorvastatin 40 milliGRAM(s) Oral at bedtime  busPIRone 20 milliGRAM(s) Oral <User Schedule>  cefTRIAXone   IVPB      DULoxetine 60 milliGRAM(s) Oral daily  furosemide   Injectable 40 milliGRAM(s) IV Push daily  gabapentin 100 milliGRAM(s) Oral two times a day  guaiFENesin  milliGRAM(s) Oral every 12 hours  heparin   Injectable 5000 Unit(s) SubCutaneous every 12 hours  hydrALAZINE 50 milliGRAM(s) Oral three times a day  influenza  Vaccine (HIGH DOSE) 0.7 milliLiter(s) IntraMuscular once  levothyroxine 125 MICROGram(s) Oral daily  losartan 100 milliGRAM(s) Oral daily  methylPREDNISolone sodium succinate Injectable 20 milliGRAM(s) IV Push three times a day  montelukast 10 milliGRAM(s) Oral daily  OLANZapine 10 milliGRAM(s) Oral at bedtime  propranolol 20 milliGRAM(s) Oral two times a day  tiotropium 2.5 MICROgram(s) Inhaler 2 Puff(s) Inhalation daily    MEDICATIONS  (PRN):    LABS: All Lab data reviewed and analyzed                        12.7   9.72  )-----------( 206      ( 2024 07:37 )             40.3    01-16    140  |  97  |  20  ----------------------------<  159<H>  3.9   |  32<H>  |  0.64    Ca    9.8      2024 07:40      Ca    9.8      2024 07:40                 40.3    Ca    9.9      2024 04:17      Ca    9.9      2024 04:17    TPro  6.9  /  Alb  3.7  /  TBili  1.0  /  DBili  x   /  AST  12  /  ALT  13  /  AlkPhos  104  01-13    PT/INR - ( 2024 01:21 )   PT: 12.2 sec;   INR: 1.17 ratio         PTT - ( 2024 01:21 )  PTT:74.2 sec LIVER FUNCTIONS - ( 2024 01:21 )  Alb: 3.7 g/dL / Pro: 6.9 g/dL / ALK PHOS: 104 U/L / ALT: 13 U/L / AST: 12 U/L / GGT: x           RADIOLOGY: - Reviewed and analyzed

## 2024-01-22 NOTE — PROGRESS NOTE ADULT - REASON FOR ADMISSION
sob

## 2024-01-23 ENCOUNTER — TRANSCRIPTION ENCOUNTER (OUTPATIENT)
Age: 74
End: 2024-01-23

## 2024-01-23 ENCOUNTER — APPOINTMENT (OUTPATIENT)
Age: 74
End: 2024-01-23
Payer: MEDICARE

## 2024-01-23 VITALS
HEART RATE: 78 BPM | OXYGEN SATURATION: 99 % | DIASTOLIC BLOOD PRESSURE: 80 MMHG | TEMPERATURE: 97.8 F | SYSTOLIC BLOOD PRESSURE: 140 MMHG | RESPIRATION RATE: 17 BRPM

## 2024-01-23 DIAGNOSIS — K59.00 CONSTIPATION, UNSPECIFIED: ICD-10-CM

## 2024-01-23 PROCEDURE — 99495 TRANSJ CARE MGMT MOD F2F 14D: CPT

## 2024-01-23 RX ORDER — HYDROCHLOROTHIAZIDE 25 MG/1
25 TABLET ORAL
Qty: 90 | Refills: 0 | Status: DISCONTINUED | COMMUNITY
Start: 2020-07-16 | End: 2024-01-23

## 2024-01-23 RX ORDER — UMECLIDINIUM 62.5 UG/1
62.5 AEROSOL, POWDER ORAL DAILY
Refills: 0 | Status: DISCONTINUED | COMMUNITY
Start: 2018-07-24 | End: 2024-01-23

## 2024-01-23 RX ORDER — GABAPENTIN 100 MG/1
100 CAPSULE ORAL TWICE DAILY
Qty: 60 | Refills: 0 | Status: ACTIVE | COMMUNITY
Start: 2020-11-22 | End: 1900-01-01

## 2024-01-23 RX ORDER — BACLOFEN 10 MG/1
10 TABLET ORAL
Qty: 90 | Refills: 0 | Status: DISCONTINUED | COMMUNITY
Start: 2020-08-05 | End: 2024-01-23

## 2024-01-23 RX ORDER — HYDRALAZINE HYDROCHLORIDE 25 MG/1
25 TABLET ORAL 3 TIMES DAILY
Refills: 0 | Status: ACTIVE | COMMUNITY
Start: 2020-07-09

## 2024-01-23 RX ORDER — BUDESONIDE 0.5 MG/2ML
0.5 INHALANT ORAL TWICE DAILY
Qty: 2 | Refills: 0 | Status: ACTIVE | COMMUNITY
Start: 2024-01-23 | End: 1900-01-01

## 2024-01-24 RX ORDER — IPRATROPIUM BROMIDE AND ALBUTEROL SULFATE 2.5; .5 MG/3ML; MG/3ML
0.5-2.5 (3) SOLUTION RESPIRATORY (INHALATION) EVERY 6 HOURS
Qty: 30 | Refills: 0 | Status: ACTIVE | COMMUNITY
Start: 2024-01-24 | End: 1900-01-01

## 2024-01-24 RX ORDER — FUROSEMIDE 40 MG/1
40 TABLET ORAL DAILY
Refills: 0 | Status: ACTIVE | COMMUNITY
Start: 2024-01-24

## 2024-01-24 RX ORDER — OLANZAPINE 10 MG/1
10 TABLET, FILM COATED ORAL 3 TIMES DAILY
Refills: 0 | Status: ACTIVE | COMMUNITY
Start: 2020-02-21

## 2024-01-24 RX ORDER — PROPRANOLOL HYDROCHLORIDE 20 MG/1
20 TABLET ORAL TWICE DAILY
Refills: 0 | Status: ACTIVE | COMMUNITY
Start: 2024-01-24

## 2024-01-24 RX ORDER — BUSPIRONE HYDROCHLORIDE 10 MG/1
10 TABLET ORAL DAILY
Refills: 0 | Status: ACTIVE | COMMUNITY
Start: 2024-01-24

## 2024-01-24 RX ORDER — DULOXETINE HYDROCHLORIDE 60 MG/1
60 CAPSULE, DELAYED RELEASE PELLETS ORAL DAILY
Refills: 0 | Status: ACTIVE | COMMUNITY
Start: 2024-01-24

## 2024-01-24 RX ORDER — RIFAXIMIN 550 MG/1
550 TABLET ORAL 3 TIMES DAILY
Qty: 42 | Refills: 1 | Status: DISCONTINUED | COMMUNITY
Start: 2022-11-10 | End: 2024-01-24

## 2024-01-24 RX ORDER — OLMESARTAN MEDOXOMIL 40 MG/1
40 TABLET, FILM COATED ORAL
Qty: 30 | Refills: 0 | Status: DISCONTINUED | COMMUNITY
Start: 2018-04-20 | End: 2024-01-24

## 2024-01-24 RX ORDER — POLYETHYLENE GLYCOL 3350 17 G/17G
17 POWDER, FOR SOLUTION ORAL
Qty: 1 | Refills: 0 | Status: ACTIVE | COMMUNITY
Start: 2024-01-24 | End: 1900-01-01

## 2024-01-24 RX ORDER — SERTRALINE HYDROCHLORIDE 100 MG/1
100 TABLET, FILM COATED ORAL
Qty: 60 | Refills: 0 | Status: DISCONTINUED | COMMUNITY
Start: 2020-02-21 | End: 2024-01-24

## 2024-01-24 RX ORDER — SODIUM SULFATE, POTASSIUM SULFATE, MAGNESIUM SULFATE 17.5; 3.13; 1.6 G/ML; G/ML; G/ML
17.5-3.13-1.6 SOLUTION, CONCENTRATE ORAL
Qty: 1 | Refills: 0 | Status: DISCONTINUED | COMMUNITY
Start: 2020-12-14 | End: 2024-01-24

## 2024-01-24 RX ORDER — MUPIROCIN 20 MG/G
2 OINTMENT TOPICAL
Qty: 22 | Refills: 0 | Status: DISCONTINUED | COMMUNITY
Start: 2020-04-08 | End: 2024-01-24

## 2024-01-24 RX ORDER — PENICILLIN V POTASSIUM 500 MG/1
500 TABLET, FILM COATED ORAL TWICE DAILY
Refills: 0 | Status: ACTIVE | COMMUNITY
Start: 2024-01-24

## 2024-01-24 RX ORDER — POLYETHYLENE GLYCOL 3350, SODIUM CHLORIDE, SODIUM BICARBONATE AND POTASSIUM CHLORIDE WITH LEMON FLAVOR 420; 11.2; 5.72; 1.48 G/4L; G/4L; G/4L; G/4L
420 POWDER, FOR SOLUTION ORAL
Qty: 1 | Refills: 0 | Status: DISCONTINUED | COMMUNITY
Start: 2021-06-24 | End: 2024-01-24

## 2024-01-24 NOTE — HISTORY OF PRESENT ILLNESS
[Post-hospitalization from ___ Hospital] : Post-hospitalization from [unfilled] Hospital [Admitted on: ___] : The patient was admitted on [unfilled] [Discharged on ___] : discharged on [unfilled] [Discharge Summary] : discharge summary [Discharge Med List] : discharge medication list [Other: ____] : [unfilled] [Med Reconciliation] : medication reconciliation has been completed [Patient Contacted By: ____] : and contacted by [unfilled] [FreeTextEntry2] : 73-year-old woman with h/o  COPD on 2L NC at home, hypothyroid,  breast CA, asthma, chronic lymphedema, wheelchair bound, obesity      presented with difficulty breathing./   had cough and difficulty breathing for the past couple of days,       then had significant trouble breathing, . had  worsened    was found by EMS to be hypoxic to 68% on 2 L nasal cannula.  /  was  placed on CPAP by EMS, which improved oxygen sat/ /  denies chest pain, syncope, fevers.    admitted wth sob, + HMPV   acute  copd  exacerbation.  on home oxygen       on  steroid /  Proventil   spiriva        CT showing pneumonia        MRSA staph epi Bacteremia > ? contamination  >>> ID appreciated         UTI on abx Pulm and ID following, appreciated on antibiotics for possible pneumonia / CAP >> Completed    h/o   acute on  c/c diastolic  chf   on iv lasix, per  card     HTN.  HLD   Continue Current medications otherwise and monitor.   adjust medications as needed per cardio   h/o esophageal   dysmotility  c/c  lymphedema      continue current management  PT / OOB as able ( patient at baseline not very active)   72 yo female seen today for TCM initial visit, HUGO Silvestre present. Pt has HHA 9hrs x 7 days via [facility]. [facility] for 1/23/24. Pt is in wheelchair, awake, alert with some confusion, able to state her needs and complaints. She reports she is constipated, unable to state when her last BM was or what she has taken in the past for constipation.  She has no c/o abdominal pain, emesis or nausea. She has productive cough, but unable to expell phlegm. She is using 2L NC, and spiriva. She does not have budesonide or duo neb medications in the home. [facility] Pt has no other complaints, states she takes her medications as prescribed and will make appointment with Dr. Johns. Pt states she has  provider and is followed by Dr. Lund. She will make appointment for follow up.  She has been educated on TCM and yellow card left in the home.

## 2024-01-24 NOTE — PLAN
[FreeTextEntry1] : pt states she will call Dr. Johns for an appointment this week.  Declined assistance with call

## 2024-01-24 NOTE — ED BEHAVIORAL HEALTH ASSESSMENT NOTE - REMOTE MEMORY
[Carbohydrate Consistent Diet] : carbohydrate consistent diet [Exercise/Effect on Glucose] : exercise/effect on glucose [Importance of Diet and Exercise] : importance of diet and exercise to improve glycemic control, achieve weight loss and improve cardiovascular health [Weight Loss] : weight loss [FreeTextEntry1] : 35 year old female with no significant PMH presenting for follow up of weight gain of 50 pounds within 1 year.  1. BMI 35 --> 37 --> 34 --> 31 -The patient was encouraged to continue to decrease their caloric intake and increase their exercise. Discussed portion control and good meal choices. Proper exercise (not only cardio) but exercising with weights and raising heart rate to 80% (safely) for a total of 150mins/week or 75 mins/week of vigorous exercise is recommended. Also would try and cut 500 calories daily from his diet. Need to cut 3,500 calories (from diet and exercise) to burn 1 pound of fat. -Continue Ozempic 1.0mg weekly. No h/o MTC, MEN2 or pancreatitis. GI s/e discussed. If still 185, can increase to 1mg ozempic weekly.  2. Pre diabetes A1c 5.7% --> 5.4% Continue ozempic, diet and exercise.  Patient verbalized understanding of the above. All questions were answered to patient's satisfaction. Dispo: Patient will follow up in 6 months. Normal

## 2024-01-24 NOTE — CURRENT MEDS
[Takes medication as prescribed] : takes [None] : Patient does not have any barriers to medication adherence [Yes] : Reviewed medication list for presence of high-risk medications. [Benzodiazepines] : benzodiazepines [Blood Thinners] : blood thinners [Sedatives] : sedatives [FreeTextEntry1] : inhalation medications ordered

## 2024-01-24 NOTE — PHYSICAL EXAM
[No Acute Distress] : no acute distress [Well Nourished] : well nourished [Well Developed] : well developed [Well-Appearing] : well-appearing [No Respiratory Distress] : no respiratory distress  [Clear to Auscultation] : lungs were clear to auscultation bilaterally [No Accessory Muscle Use] : no accessory muscle use [de-identified] : Cough, not able to expel phlegm

## 2024-01-24 NOTE — REVIEW OF SYSTEMS
[Fatigue] : fatigue [Vision Problems] : vision problems [Lower Ext Edema] : lower extremity edema [Orthopnea] : orthopnea [Cough] : cough [Constipation] : constipation [Incontinence] : incontinence [Muscle Weakness] : muscle weakness [Memory Loss] : memory loss [Unsteady Walking] : ataxia [Negative] : Heme/Lymph [Chest Pain] : no chest pain [Palpitations] : no palpitations [Shortness Of Breath] : no shortness of breath [Wheezing] : no wheezing [Dyspnea on Exertion] : no dyspnea on exertion [Abdominal Pain] : no abdominal pain [Nausea] : no nausea [Diarrhea] : diarrhea [Vomiting] : no vomiting [Heartburn] : no heartburn [Melena] : no melena [Headache] : no headache [Dizziness] : no dizziness [Fainting] : no fainting [Confusion] : no confusion [FreeTextEntry5] : " I sleep in the bed with the head elevated" [FreeTextEntry6] : " I use oxygen, and I can't bring up phlegm" [FreeTextEntry7] : "I haven't gone to the bathroom in 6 days" [de-identified] : " I can't walk" [de-identified] : being treated for depression has psychiatrist

## 2024-01-24 NOTE — HEALTH RISK ASSESSMENT
[No] : In the past 12 months have you used drugs other than those required for medical reasons? No [No falls in past year] : Patient reported no falls in the past year [Patient not ambulatory] : Patient is not ambulatory [Little interest or pleasure doing things] : 1) Little interest or pleasure doing things [Assistive Device] : Patient uses an assistive device [Feeling down, depressed, or hopeless] : 2) Feeling down, depressed, or hopeless [0] : 2) Feeling down, depressed, or hopeless: Not at all (0) [PHQ-2 Negative - No further assessment needed] : PHQ-2 Negative - No further assessment needed [Low Salt Diet] : low salt [General Adherence] : general adherence [Change in mental status noted] : Change in mental status noted [None] : None [# Of Children ___] : has [unfilled] children [] :  [Independent] : feeding [Some assistance needed] : managing finances [Full assistance needed] : using transportation [Reviewed no changes] : Reviewed, no changes [Designated Healthcare Proxy] : Designated healthcare proxy [Name: ___] : Health Care Proxy's Name: [unfilled]  [Relationship: ___] : Relationship: [unfilled] [Aggressive treatment] : aggressive treatment [I will adhere to the patient's wishes.] : I will adhere to the patient's wishes. [Time Spent: ___ minutes] : Time Spent: [unfilled] minutes [de-identified] : none [de-identified] : low salt [de-identified] : w/c  [[id number]] : 0 [Language] : denies difficulty with language [Behavior] : denies difficulty with behavior [Learning/Retaining New Information] : denies difficulty learning/retaining new information [Handling Complex Tasks] : denies difficulty handling complex tasks [Reasoning] : denies difficulty with reasoning [de-identified] : some memory loss [Spatial Ability and Orientation] : denies difficulty with spatial ability and orientation [AdvancecareDate] : 01/24

## 2024-01-24 NOTE — COUNSELING
[Fall prevention counseling provided] : Fall prevention counseling provided [Adequate lighting] : Adequate lighting [No throw rugs] : No throw rugs [Use proper foot wear] : Use proper foot wear [Use recommended devices] : Use recommended devices [FreeTextEntry1] : wheel chair, pt is dependent for transfers.

## 2024-01-26 PROBLEM — K59.00 CONSTIPATION IN FEMALE: Status: ACTIVE | Noted: 2024-01-24

## 2024-01-29 ENCOUNTER — INPATIENT (INPATIENT)
Facility: HOSPITAL | Age: 74
LOS: 3 days | Discharge: HOME HEALTH | DRG: 683 | End: 2024-02-02
Attending: GENERAL PRACTICE | Admitting: GENERAL PRACTICE
Payer: MEDICARE

## 2024-01-29 VITALS
DIASTOLIC BLOOD PRESSURE: 53 MMHG | SYSTOLIC BLOOD PRESSURE: 88 MMHG | HEIGHT: 62 IN | WEIGHT: 201.94 LBS | TEMPERATURE: 98 F | RESPIRATION RATE: 22 BRPM | HEART RATE: 111 BPM | OXYGEN SATURATION: 94 %

## 2024-01-29 DIAGNOSIS — Z98.89 OTHER SPECIFIED POSTPROCEDURAL STATES: Chronic | ICD-10-CM

## 2024-01-29 DIAGNOSIS — Z90.5 ACQUIRED ABSENCE OF KIDNEY: Chronic | ICD-10-CM

## 2024-01-29 DIAGNOSIS — N17.9 ACUTE KIDNEY FAILURE, UNSPECIFIED: ICD-10-CM

## 2024-01-29 DIAGNOSIS — J44.89 OTHER SPECIFIED CHRONIC OBSTRUCTIVE PULMONARY DISEASE: ICD-10-CM

## 2024-01-29 DIAGNOSIS — I10 ESSENTIAL (PRIMARY) HYPERTENSION: ICD-10-CM

## 2024-01-29 DIAGNOSIS — E03.9 HYPOTHYROIDISM, UNSPECIFIED: ICD-10-CM

## 2024-01-29 DIAGNOSIS — Z90.49 ACQUIRED ABSENCE OF OTHER SPECIFIED PARTS OF DIGESTIVE TRACT: Chronic | ICD-10-CM

## 2024-01-29 DIAGNOSIS — I50.32 CHRONIC DIASTOLIC (CONGESTIVE) HEART FAILURE: ICD-10-CM

## 2024-01-29 DIAGNOSIS — R19.7 DIARRHEA, UNSPECIFIED: ICD-10-CM

## 2024-01-29 DIAGNOSIS — E78.5 HYPERLIPIDEMIA, UNSPECIFIED: ICD-10-CM

## 2024-01-29 DIAGNOSIS — Z29.9 ENCOUNTER FOR PROPHYLACTIC MEASURES, UNSPECIFIED: ICD-10-CM

## 2024-01-29 LAB
ALBUMIN SERPL ELPH-MCNC: 3.4 G/DL — SIGNIFICANT CHANGE UP (ref 3.3–5)
ALP SERPL-CCNC: 94 U/L — SIGNIFICANT CHANGE UP (ref 40–120)
ALT FLD-CCNC: 17 U/L — SIGNIFICANT CHANGE UP (ref 10–45)
ANION GAP SERPL CALC-SCNC: 20 MMOL/L — HIGH (ref 5–17)
APTT BLD: 45.6 SEC — HIGH (ref 24.5–35.6)
AST SERPL-CCNC: 24 U/L — SIGNIFICANT CHANGE UP (ref 10–40)
BASE EXCESS BLDV CALC-SCNC: -3 MMOL/L — LOW (ref -2–3)
BASOPHILS # BLD AUTO: 0 K/UL — SIGNIFICANT CHANGE UP (ref 0–0.2)
BASOPHILS NFR BLD AUTO: 0 % — SIGNIFICANT CHANGE UP (ref 0–2)
BILIRUB SERPL-MCNC: 0.9 MG/DL — SIGNIFICANT CHANGE UP (ref 0.2–1.2)
BUN SERPL-MCNC: 155 MG/DL — HIGH (ref 7–23)
CA-I SERPL-SCNC: 1.15 MMOL/L — SIGNIFICANT CHANGE UP (ref 1.15–1.33)
CALCIUM SERPL-MCNC: 9.4 MG/DL — SIGNIFICANT CHANGE UP (ref 8.4–10.5)
CHLORIDE BLDV-SCNC: 91 MMOL/L — LOW (ref 96–108)
CHLORIDE SERPL-SCNC: 89 MMOL/L — LOW (ref 96–108)
CO2 BLDV-SCNC: 26 MMOL/L — SIGNIFICANT CHANGE UP (ref 22–26)
CO2 SERPL-SCNC: 21 MMOL/L — LOW (ref 22–31)
CREAT SERPL-MCNC: 2.65 MG/DL — HIGH (ref 0.5–1.3)
EGFR: 18 ML/MIN/1.73M2 — LOW
EOSINOPHIL # BLD AUTO: 0 K/UL — SIGNIFICANT CHANGE UP (ref 0–0.5)
EOSINOPHIL NFR BLD AUTO: 0 % — SIGNIFICANT CHANGE UP (ref 0–6)
FLUAV AG NPH QL: SIGNIFICANT CHANGE UP
FLUBV AG NPH QL: SIGNIFICANT CHANGE UP
GAS PNL BLDV: 126 MMOL/L — LOW (ref 136–145)
GAS PNL BLDV: SIGNIFICANT CHANGE UP
GAS PNL BLDV: SIGNIFICANT CHANGE UP
GLUCOSE BLDV-MCNC: 121 MG/DL — HIGH (ref 70–99)
GLUCOSE SERPL-MCNC: 127 MG/DL — HIGH (ref 70–99)
HCO3 BLDV-SCNC: 24 MMOL/L — SIGNIFICANT CHANGE UP (ref 22–29)
HCT VFR BLD CALC: 42.4 % — SIGNIFICANT CHANGE UP (ref 34.5–45)
HCT VFR BLDA CALC: 41 % — SIGNIFICANT CHANGE UP (ref 34.5–46.5)
HGB BLD CALC-MCNC: 13.7 G/DL — SIGNIFICANT CHANGE UP (ref 11.7–16.1)
HGB BLD-MCNC: 13.6 G/DL — SIGNIFICANT CHANGE UP (ref 11.5–15.5)
INR BLD: 1.02 RATIO — SIGNIFICANT CHANGE UP (ref 0.85–1.18)
LACTATE BLDV-MCNC: 1.3 MMOL/L — SIGNIFICANT CHANGE UP (ref 0.5–2)
LYMPHOCYTES # BLD AUTO: 1.17 K/UL — SIGNIFICANT CHANGE UP (ref 1–3.3)
LYMPHOCYTES # BLD AUTO: 6 % — LOW (ref 13–44)
MANUAL SMEAR VERIFICATION: SIGNIFICANT CHANGE UP
MCHC RBC-ENTMCNC: 29.6 PG — SIGNIFICANT CHANGE UP (ref 27–34)
MCHC RBC-ENTMCNC: 32.1 GM/DL — SIGNIFICANT CHANGE UP (ref 32–36)
MCV RBC AUTO: 92.4 FL — SIGNIFICANT CHANGE UP (ref 80–100)
MONOCYTES # BLD AUTO: 1.35 K/UL — HIGH (ref 0–0.9)
MONOCYTES NFR BLD AUTO: 6.9 % — SIGNIFICANT CHANGE UP (ref 2–14)
NEUTROPHILS # BLD AUTO: 17.02 K/UL — HIGH (ref 1.8–7.4)
NEUTROPHILS NFR BLD AUTO: 87.1 % — HIGH (ref 43–77)
NT-PROBNP SERPL-SCNC: 838 PG/ML — HIGH (ref 0–300)
PCO2 BLDV: 52 MMHG — HIGH (ref 39–42)
PH BLDV: 7.28 — LOW (ref 7.32–7.43)
PLAT MORPH BLD: ABNORMAL
PLATELET # BLD AUTO: 152 K/UL — SIGNIFICANT CHANGE UP (ref 150–400)
PO2 BLDV: 41 MMHG — SIGNIFICANT CHANGE UP (ref 25–45)
POTASSIUM BLDV-SCNC: 4.3 MMOL/L — SIGNIFICANT CHANGE UP (ref 3.5–5.1)
POTASSIUM SERPL-MCNC: 4.5 MMOL/L — SIGNIFICANT CHANGE UP (ref 3.5–5.3)
POTASSIUM SERPL-SCNC: 4.5 MMOL/L — SIGNIFICANT CHANGE UP (ref 3.5–5.3)
PROT SERPL-MCNC: 6.6 G/DL — SIGNIFICANT CHANGE UP (ref 6–8.3)
PROTHROM AB SERPL-ACNC: 11.2 SEC — SIGNIFICANT CHANGE UP (ref 9.5–13)
RBC # BLD: 4.59 M/UL — SIGNIFICANT CHANGE UP (ref 3.8–5.2)
RBC # FLD: 15.1 % — HIGH (ref 10.3–14.5)
RBC BLD AUTO: SIGNIFICANT CHANGE UP
RSV RNA NPH QL NAA+NON-PROBE: SIGNIFICANT CHANGE UP
SAO2 % BLDV: 62.5 % — LOW (ref 67–88)
SARS-COV-2 RNA SPEC QL NAA+PROBE: SIGNIFICANT CHANGE UP
SODIUM SERPL-SCNC: 130 MMOL/L — LOW (ref 135–145)
WBC # BLD: 19.54 K/UL — HIGH (ref 3.8–10.5)
WBC # FLD AUTO: 19.54 K/UL — HIGH (ref 3.8–10.5)

## 2024-01-29 PROCEDURE — 99223 1ST HOSP IP/OBS HIGH 75: CPT

## 2024-01-29 PROCEDURE — 99285 EMERGENCY DEPT VISIT HI MDM: CPT

## 2024-01-29 PROCEDURE — 71045 X-RAY EXAM CHEST 1 VIEW: CPT | Mod: 26

## 2024-01-29 RX ORDER — DULOXETINE HYDROCHLORIDE 30 MG/1
60 CAPSULE, DELAYED RELEASE ORAL DAILY
Refills: 0 | Status: DISCONTINUED | OUTPATIENT
Start: 2024-01-29 | End: 2024-02-02

## 2024-01-29 RX ORDER — ATORVASTATIN CALCIUM 80 MG/1
40 TABLET, FILM COATED ORAL AT BEDTIME
Refills: 0 | Status: DISCONTINUED | OUTPATIENT
Start: 2024-01-29 | End: 2024-02-02

## 2024-01-29 RX ORDER — SODIUM CHLORIDE 9 MG/ML
1000 INJECTION INTRAMUSCULAR; INTRAVENOUS; SUBCUTANEOUS
Refills: 0 | Status: DISCONTINUED | OUTPATIENT
Start: 2024-01-29 | End: 2024-02-02

## 2024-01-29 RX ORDER — GABAPENTIN 400 MG/1
100 CAPSULE ORAL
Refills: 0 | Status: DISCONTINUED | OUTPATIENT
Start: 2024-01-29 | End: 2024-02-02

## 2024-01-29 RX ORDER — ONDANSETRON 8 MG/1
4 TABLET, FILM COATED ORAL EVERY 8 HOURS
Refills: 0 | Status: DISCONTINUED | OUTPATIENT
Start: 2024-01-29 | End: 2024-02-02

## 2024-01-29 RX ORDER — MONTELUKAST 4 MG/1
10 TABLET, CHEWABLE ORAL DAILY
Refills: 0 | Status: DISCONTINUED | OUTPATIENT
Start: 2024-01-29 | End: 2024-02-02

## 2024-01-29 RX ORDER — LANOLIN ALCOHOL/MO/W.PET/CERES
3 CREAM (GRAM) TOPICAL AT BEDTIME
Refills: 0 | Status: DISCONTINUED | OUTPATIENT
Start: 2024-01-29 | End: 2024-02-02

## 2024-01-29 RX ORDER — HEPARIN SODIUM 5000 [USP'U]/ML
5000 INJECTION INTRAVENOUS; SUBCUTANEOUS EVERY 12 HOURS
Refills: 0 | Status: DISCONTINUED | OUTPATIENT
Start: 2024-01-29 | End: 2024-02-02

## 2024-01-29 RX ORDER — ACETAMINOPHEN 500 MG
1000 TABLET ORAL ONCE
Refills: 0 | Status: COMPLETED | OUTPATIENT
Start: 2024-01-29 | End: 2024-01-29

## 2024-01-29 RX ORDER — MORPHINE SULFATE 50 MG/1
4 CAPSULE, EXTENDED RELEASE ORAL ONCE
Refills: 0 | Status: DISCONTINUED | OUTPATIENT
Start: 2024-01-29 | End: 2024-01-29

## 2024-01-29 RX ORDER — BUDESONIDE, MICRONIZED 100 %
0.5 POWDER (GRAM) MISCELLANEOUS
Refills: 0 | Status: DISCONTINUED | OUTPATIENT
Start: 2024-01-29 | End: 2024-02-02

## 2024-01-29 RX ORDER — TIOTROPIUM BROMIDE 18 UG/1
2 CAPSULE ORAL; RESPIRATORY (INHALATION) DAILY
Refills: 0 | Status: DISCONTINUED | OUTPATIENT
Start: 2024-01-29 | End: 2024-02-02

## 2024-01-29 RX ORDER — OLANZAPINE 15 MG/1
10 TABLET, FILM COATED ORAL AT BEDTIME
Refills: 0 | Status: DISCONTINUED | OUTPATIENT
Start: 2024-01-29 | End: 2024-02-02

## 2024-01-29 RX ORDER — AMLODIPINE BESYLATE 2.5 MG/1
5 TABLET ORAL DAILY
Refills: 0 | Status: DISCONTINUED | OUTPATIENT
Start: 2024-01-30 | End: 2024-02-02

## 2024-01-29 RX ORDER — HYDRALAZINE HCL 50 MG
50 TABLET ORAL THREE TIMES A DAY
Refills: 0 | Status: DISCONTINUED | OUTPATIENT
Start: 2024-01-30 | End: 2024-02-02

## 2024-01-29 RX ORDER — IPRATROPIUM/ALBUTEROL SULFATE 18-103MCG
3 AEROSOL WITH ADAPTER (GRAM) INHALATION
Refills: 0 | Status: DISCONTINUED | OUTPATIENT
Start: 2024-01-29 | End: 2024-02-02

## 2024-01-29 RX ORDER — IPRATROPIUM/ALBUTEROL SULFATE 18-103MCG
3 AEROSOL WITH ADAPTER (GRAM) INHALATION EVERY 6 HOURS
Refills: 0 | Status: DISCONTINUED | OUTPATIENT
Start: 2024-01-29 | End: 2024-02-02

## 2024-01-29 RX ORDER — ALBUTEROL 90 UG/1
2 AEROSOL, METERED ORAL EVERY 6 HOURS
Refills: 0 | Status: DISCONTINUED | OUTPATIENT
Start: 2024-01-29 | End: 2024-02-02

## 2024-01-29 RX ORDER — LIDOCAINE 4 G/100G
1 CREAM TOPICAL ONCE
Refills: 0 | Status: COMPLETED | OUTPATIENT
Start: 2024-01-29 | End: 2024-01-29

## 2024-01-29 RX ORDER — ACETAMINOPHEN 500 MG
650 TABLET ORAL EVERY 6 HOURS
Refills: 0 | Status: DISCONTINUED | OUTPATIENT
Start: 2024-01-29 | End: 2024-02-02

## 2024-01-29 RX ORDER — LEVOTHYROXINE SODIUM 125 MCG
125 TABLET ORAL DAILY
Refills: 0 | Status: DISCONTINUED | OUTPATIENT
Start: 2024-01-29 | End: 2024-02-02

## 2024-01-29 RX ORDER — ASPIRIN/CALCIUM CARB/MAGNESIUM 324 MG
81 TABLET ORAL DAILY
Refills: 0 | Status: DISCONTINUED | OUTPATIENT
Start: 2024-01-29 | End: 2024-02-02

## 2024-01-29 RX ADMIN — Medication 400 MILLIGRAM(S): at 20:48

## 2024-01-29 RX ADMIN — Medication 3 MILLILITER(S): at 17:58

## 2024-01-29 RX ADMIN — MORPHINE SULFATE 4 MILLIGRAM(S): 50 CAPSULE, EXTENDED RELEASE ORAL at 23:58

## 2024-01-29 RX ADMIN — Medication 50 MILLIGRAM(S): at 20:49

## 2024-01-29 RX ADMIN — LIDOCAINE 1 PATCH: 4 CREAM TOPICAL at 21:35

## 2024-01-29 RX ADMIN — Medication 3 MILLILITER(S): at 19:00

## 2024-01-29 NOTE — ED PROVIDER NOTE - OBJECTIVE STATEMENT
73-year-old female with PMH COPD on 3 L nasal cannula at home, hypothyroid, diastolic CHF on Lasix, breast cancer status post surgery, asthma, chronic lymphedema, kidney cancer status post partial nephrectomy, wheelchair-bound, obesity presenting for cough and diarrhea.  Patient states that she has had diarrhea about 3 episodes per day for the past 2 days and has had continued cough dry for the past month.  Patient was recently admitted from January 13 to January 20 for shortness of breath and at that time was found to be hypoxic on 2 L nasal cannula admitted with human metapneumovirus and acute COPD exacerbation.  Patient states that a day after she went home she started feeling sick again.  Denies fever, chest pain, shortness of breath, abdominal pain, nausea, vomiting, dysuria, constipation.  Endorses decreased p.o. intake.  Feels weak and is now unable to transfer from her wheelchair to the bed as easily as she was before.

## 2024-01-29 NOTE — H&P ADULT - NSHPLABSRESULTS_GEN_ALL_CORE
13.6   19.54 )-----------( 152      ( 29 Jan 2024 18:05 )             42.4       01-29    130<L>  |  89<L>  |  155<H>  ----------------------------<  127<H>  4.5   |  21<L>  |  2.65<H>    Ca    9.4      29 Jan 2024 18:05    TPro  6.6  /  Alb  3.4  /  TBili  0.9  /  DBili  x   /  AST  24  /  ALT  17  /  AlkPhos  94  01-29      Pro-Brain Natriuretic Peptide: 838 pg/mL (01.29.24 @ 18:05)      Flu With COVID-19 By FORREST (01.29.24 @ 18:05)    SARS-CoV-2 Result: NotDetec    Influenza A Result: NotDetec    Influenza B Result: NotDetec    Resp Syn Virus Result: NotDetec      - - - - - - - - - - - - - - - - - - - - - - - - - - - - - - - - - - - - - - - - - - - - - - - - - - - -       EKG PERSONALLY REVIEWED:  NSR 95 bpm    IMAGES PERSONALLY REVIEWED:     < from: Xray Chest 1 View- PORTABLE-Urgent (01.29.24 @ 19:03) >  INTERPRETATION:  No focal opacities

## 2024-01-29 NOTE — H&P ADULT - PROBLEM SELECTOR PLAN 2
P/w 2 days of diarrhea, poor intake, weakness  Hx/o kidney Ca s/p partial nephrectomy     - Cr 2.65, (baseline ~ 0.5 -0.6)  - ALDAIR iso dehydration from diarrhea   - IVF  - Trend labs, monitor renal function   - Avoid nephrotoxic meds

## 2024-01-29 NOTE — ED ADULT NURSE NOTE - NSFALLRISKINTERV_ED_ALL_ED

## 2024-01-29 NOTE — H&P ADULT - ASSESSMENT
73y F PMH COPD on 3 L NC at home, hypothyroid, diastolic CHF on Lasix, breast cancer s/p surgery, asthma, chronic lymphedema, kidney cancer  s/p partial nephrectomy, wheelchair-bound, obesity presenting for diarrhea found to have ALDAIR, admit fo further management

## 2024-01-29 NOTE — ED PROVIDER NOTE - ATTENDING CONTRIBUTION TO CARE
see mdm    edited by Radha Mcpherson DO - attending physician.   Please see progress notes for status/labs/consult updates and ED course after initial presentation.

## 2024-01-29 NOTE — ED PROVIDER NOTE - CLINICAL SUMMARY MEDICAL DECISION MAKING FREE TEXT BOX
73-year-old female with PMH COPD on 3L nasal cannula at home, hypothyroid, breast cancer, asthma, chronic lymphedema presenting for follow-up, diarrhea, reports similar symptoms, she was admitted recently from January 13 January 20.  Exam shows decreased aeration on lungs, chronic lymphedema bilaterally, erythema to left lower extremity, abdomen soft nontender.  Concerning for COPD exacerbation.  Will obtain labs, chest x-ray, albuterol/ipratropium breathing treatments.  Dispo likely admission. 73-year-old female with PMH COPD on 3L nasal cannula at home, hypothyroid, breast cancer, asthma, chronic lymphedema presenting for follow-up, diarrhea, reports similar symptoms, she was admitted recently from January 13 January 20.  Exam shows decreased aeration on lungs, chronic lymphedema bilaterally, erythema to left lower extremity, abdomen soft nontender.  Concerning for COPD exacerbation.  Will obtain labs, chest x-ray, albuterol/ipratropium breathing treatments.  Dispo likely admission.    Radha Mcpherson, Attending Physician: 73-year-old female with COPD on 3 L at home with acute on chronic shortness of breath and new onset diarrhea with abdominal pain that improves with diarrhea and no abdominal tenderness on exam.  Differential diagnosis includes not limited to COPD exacerbation, new onset viral syndrome, Legionella is on the differential but will send testing in the absence of the other etiologies, pneumonia, less likely C. difficile as it would not explain the shortness of breath.

## 2024-01-29 NOTE — ED PROVIDER NOTE - PROGRESS NOTE DETAILS
Radha Mcpherson, Attending Physician:  Panfilo at bedside - in event he needs to leave, # [phone]. Radha Mcpherson, Attending Physician: Pt with ALDAIR, hyponatremia and hypochloremia, likely 2/2 dehydration in setting of diarrhea. White count doubled. Will warrant admission.

## 2024-01-29 NOTE — ED PROVIDER NOTE - PHYSICAL EXAMINATION
GENERAL: ill appearing in no acute distress, non-toxic appearing  HEAD: normocephalic, atraumatic  HEENT: normal conjunctiva   CARDIAC: regular rate and rhythm, normal S1S2, no appreciable murmurs, 2+ pulses in UE b/l  PULM: decreased breath sounds, decreased aeration  GI: abdomen nondistended, soft, nontender, no guarding, rebound tenderness  :  no suprapubic tenderness  NEURO: no gross motor or sensory deficits, normal speech, PERRL, EOMI, AAOx3  MSK: 4+ peripheral edema, left calf erythema  SKIN:  left calf erythema, diffuse bruises on upper arms, lower abdomen ecchymosis  PSYCH: appropriate mood and affect GENERAL: chronically ill appearing in no acute distress, non-toxic appearing,  HEAD: normocephalic, atraumatic  HEENT: normal conjunctiva   CARDIAC: regular rate and rhythm, normal S1S2, no appreciable murmurs, 2+ pulses in UE b/l  PULM: decreased breath sounds, decreased aeration, may be limited by habitus, speaking in shortened sentences, on supplemental O2.  GI: abdomen nondistended, soft, nontender, no guarding, rebound tenderness  :  no suprapubic tenderness  NEURO: no gross motor or sensory deficits, normal speech, PERRL, EOMI, AAOx3  MSK: 4+ peripheral edema, left calf erythema  SKIN:  left calf erythema, diffuse bruises on upper arms, lower abdomen ecchymosis  PSYCH: appropriate mood and affect

## 2024-01-29 NOTE — H&P ADULT - PROBLEM SELECTOR PLAN 4
,[email or web address]
- Hold BP meds iso ALDAIR, resume in AM

## 2024-01-29 NOTE — H&P ADULT - HISTORY OF PRESENT ILLNESS
73y F PMH COPD on 3 L NC at home, hypothyroid, diastolic CHF on Lasix, breast cancer s/p surgery, asthma, chronic lymphedema, kidney cancer  s/p partial nephrectomy, wheelchair-bound, obesity presenting for cough and diarrhea.  Patient states that she had diarrhea about 3 episodes per day for the past 2 days and has had continued dry cough for the past month.  Patient was recently admitted from January 13 to January 20 for SOB admitted with human metapneumovirus and acute COPD exacerbation.  Patient states that a day after she went home she started feeling sick again.  Denies fever, chest pain, shortness of breath, abdominal pain, nausea, vomiting, dysuria, constipation.  Endorses poor appetite  decreased p.o. intake.  Feels weak is now unable to transfer from her wheelchair to the bed as easily as before.     ROS: Denies CP, SOB, palpitation, N/V, fever, chills, dizziness, abm pain, recent travel, change in urinary habits     A 10-system ROS was performed and is negative except as noted above and/or in the HPI.

## 2024-01-29 NOTE — ED ADULT NURSE NOTE - OBJECTIVE STATEMENT
73 y.o F BIB EMS p/w c/o diarrhea. A+Ox4. Pt  was just recently d/c from [facility] for treatment of pneumonia via IV abx.  was d/c home on oral abx, and about x3 days ago started having sudden onset diarrhea w/ no diet changes.  has been having x3 bowel loose bowel movements per day, endorsing diffuse stomach pain. Also has PMH COPD, on 2L NC at baseline, stating feels SOB. On 2L NC satting 94%, placed on 4L NC for comfort. Stating uses walker at baseline but has been unable due to weakness. Unable to stand up on own. Wheeze noted to B/L LS. Wet cough noted, no production. Denies any increased swelling of LEs. Denies CP, urinary sx, f/c, bloody stools. No other complaints at this time, safety maintained.

## 2024-01-29 NOTE — H&P ADULT - NSHPPHYSICALEXAM_GEN_ALL_CORE
T(C): 36.6 (01-29-24 @ 21:56), Max: 37 (01-29-24 @ 17:56)  HR: 94 (01-29-24 @ 21:56) (86 - 111)  BP: 130/76 (01-29-24 @ 21:56) (88/53 - 130/76)  RR: 20 (01-29-24 @ 21:56) (20 - 22)  SpO2: 96% (01-29-24 @ 21:56) (94% - 100%)    CONSTITUTIONAL: Well groomed, no apparent distress  EYES: PERRLA and symmetric, EOMI  ENMT: MMM. Normal dentition  RESP: No respiratory distress, no use of accessory muscles; CTA b/l, 3l NC   CV: +S1S2, RRR, no MRG; no peripheral edema  GI: Soft, mild TTP of hypogastric region  MSK: Normal pain free ROM x4 extremities   SKIN: Chronic skin changes lower extremities   NEURO: CN II-XII grossly intact; normal reflexes, sensation intact throughout   PSYCH:  A+O x 3

## 2024-01-30 LAB
ANION GAP SERPL CALC-SCNC: 21 MMOL/L — HIGH (ref 5–17)
BUN SERPL-MCNC: 154 MG/DL — HIGH (ref 7–23)
CALCIUM SERPL-MCNC: 8.7 MG/DL — SIGNIFICANT CHANGE UP (ref 8.4–10.5)
CHLORIDE SERPL-SCNC: 93 MMOL/L — LOW (ref 96–108)
CK MB BLD-MCNC: 2.4 % — SIGNIFICANT CHANGE UP (ref 0–3.5)
CK MB CFR SERPL CALC: 7.1 NG/ML — HIGH (ref 0–3.8)
CK SERPL-CCNC: 299 U/L — HIGH (ref 25–170)
CO2 SERPL-SCNC: 20 MMOL/L — LOW (ref 22–31)
CREAT SERPL-MCNC: 2.28 MG/DL — HIGH (ref 0.5–1.3)
EGFR: 22 ML/MIN/1.73M2 — LOW
GLUCOSE SERPL-MCNC: 137 MG/DL — HIGH (ref 70–99)
HCT VFR BLD CALC: 40.6 % — SIGNIFICANT CHANGE UP (ref 34.5–45)
HGB BLD-MCNC: 13.2 G/DL — SIGNIFICANT CHANGE UP (ref 11.5–15.5)
INR BLD: 1.08 RATIO — SIGNIFICANT CHANGE UP (ref 0.85–1.18)
MCHC RBC-ENTMCNC: 30 PG — SIGNIFICANT CHANGE UP (ref 27–34)
MCHC RBC-ENTMCNC: 32.5 GM/DL — SIGNIFICANT CHANGE UP (ref 32–36)
MCV RBC AUTO: 92.3 FL — SIGNIFICANT CHANGE UP (ref 80–100)
NRBC # BLD: 0 /100 WBCS — SIGNIFICANT CHANGE UP (ref 0–0)
PLATELET # BLD AUTO: 132 K/UL — LOW (ref 150–400)
POTASSIUM SERPL-MCNC: 4.4 MMOL/L — SIGNIFICANT CHANGE UP (ref 3.5–5.3)
POTASSIUM SERPL-SCNC: 4.4 MMOL/L — SIGNIFICANT CHANGE UP (ref 3.5–5.3)
PROTHROM AB SERPL-ACNC: 11.3 SEC — SIGNIFICANT CHANGE UP (ref 9.5–13)
RBC # BLD: 4.4 M/UL — SIGNIFICANT CHANGE UP (ref 3.8–5.2)
RBC # FLD: 15.1 % — HIGH (ref 10.3–14.5)
SODIUM SERPL-SCNC: 134 MMOL/L — LOW (ref 135–145)
TROPONIN T, HIGH SENSITIVITY RESULT: 55 NG/L — HIGH (ref 0–51)
WBC # BLD: 19.08 K/UL — HIGH (ref 3.8–10.5)
WBC # FLD AUTO: 19.08 K/UL — HIGH (ref 3.8–10.5)

## 2024-01-30 PROCEDURE — 99221 1ST HOSP IP/OBS SF/LOW 40: CPT

## 2024-01-30 RX ORDER — INFLUENZA VIRUS VACCINE 15; 15; 15; 15 UG/.5ML; UG/.5ML; UG/.5ML; UG/.5ML
0.7 SUSPENSION INTRAMUSCULAR ONCE
Refills: 0 | Status: DISCONTINUED | OUTPATIENT
Start: 2024-01-30 | End: 2024-02-02

## 2024-01-30 RX ORDER — ALPRAZOLAM 0.25 MG
0.25 TABLET ORAL ONCE
Refills: 0 | Status: DISCONTINUED | OUTPATIENT
Start: 2024-01-30 | End: 2024-01-30

## 2024-01-30 RX ORDER — SODIUM CHLORIDE 9 MG/ML
500 INJECTION INTRAMUSCULAR; INTRAVENOUS; SUBCUTANEOUS ONCE
Refills: 0 | Status: COMPLETED | OUTPATIENT
Start: 2024-01-30 | End: 2024-01-30

## 2024-01-30 RX ADMIN — Medication 30 MILLIGRAM(S): at 06:29

## 2024-01-30 RX ADMIN — Medication 0.25 MILLIGRAM(S): at 17:28

## 2024-01-30 RX ADMIN — Medication 81 MILLIGRAM(S): at 12:30

## 2024-01-30 RX ADMIN — OLANZAPINE 10 MILLIGRAM(S): 15 TABLET, FILM COATED ORAL at 22:19

## 2024-01-30 RX ADMIN — Medication 3 MILLILITER(S): at 17:29

## 2024-01-30 RX ADMIN — AMLODIPINE BESYLATE 5 MILLIGRAM(S): 2.5 TABLET ORAL at 06:29

## 2024-01-30 RX ADMIN — SODIUM CHLORIDE 500 MILLILITER(S): 9 INJECTION INTRAMUSCULAR; INTRAVENOUS; SUBCUTANEOUS at 01:25

## 2024-01-30 RX ADMIN — Medication 50 MILLIGRAM(S): at 22:18

## 2024-01-30 RX ADMIN — ATORVASTATIN CALCIUM 40 MILLIGRAM(S): 80 TABLET, FILM COATED ORAL at 22:19

## 2024-01-30 RX ADMIN — Medication 3 MILLILITER(S): at 06:29

## 2024-01-30 RX ADMIN — Medication 50 MILLIGRAM(S): at 14:56

## 2024-01-30 RX ADMIN — HEPARIN SODIUM 5000 UNIT(S): 5000 INJECTION INTRAVENOUS; SUBCUTANEOUS at 06:30

## 2024-01-30 RX ADMIN — Medication 650 MILLIGRAM(S): at 08:01

## 2024-01-30 RX ADMIN — SODIUM CHLORIDE 75 MILLILITER(S): 9 INJECTION INTRAMUSCULAR; INTRAVENOUS; SUBCUTANEOUS at 01:25

## 2024-01-30 RX ADMIN — GABAPENTIN 100 MILLIGRAM(S): 400 CAPSULE ORAL at 17:28

## 2024-01-30 RX ADMIN — Medication 0.5 MILLIGRAM(S): at 06:30

## 2024-01-30 RX ADMIN — Medication 3 MILLILITER(S): at 01:30

## 2024-01-30 RX ADMIN — DULOXETINE HYDROCHLORIDE 60 MILLIGRAM(S): 30 CAPSULE, DELAYED RELEASE ORAL at 12:30

## 2024-01-30 RX ADMIN — MONTELUKAST 10 MILLIGRAM(S): 4 TABLET, CHEWABLE ORAL at 12:30

## 2024-01-30 RX ADMIN — Medication 10 MILLIGRAM(S): at 17:28

## 2024-01-30 RX ADMIN — Medication 0.25 MILLIGRAM(S): at 16:01

## 2024-01-30 RX ADMIN — HEPARIN SODIUM 5000 UNIT(S): 5000 INJECTION INTRAVENOUS; SUBCUTANEOUS at 17:29

## 2024-01-30 RX ADMIN — Medication 3 MILLILITER(S): at 12:31

## 2024-01-30 RX ADMIN — Medication 125 MICROGRAM(S): at 06:29

## 2024-01-30 RX ADMIN — Medication 10 MILLIGRAM(S): at 06:29

## 2024-01-30 RX ADMIN — Medication 0.5 MILLIGRAM(S): at 17:28

## 2024-01-30 RX ADMIN — GABAPENTIN 100 MILLIGRAM(S): 400 CAPSULE ORAL at 06:29

## 2024-01-30 RX ADMIN — Medication 50 MILLIGRAM(S): at 06:29

## 2024-01-30 NOTE — PHYSICAL THERAPY INITIAL EVALUATION ADULT - BED MOBILITY TRAINING, PT EVAL
GOAL: Pt will perform all bed mobility activities with min assist in 2 weeks. GOAL: Pt will perform all bed mobility activities with prior level of assist in 2 weeks.

## 2024-01-30 NOTE — PATIENT PROFILE ADULT - FALL HARM RISK - HARM RISK INTERVENTIONS

## 2024-01-30 NOTE — CONSULT NOTE ADULT - ASSESSMENT
Impression:    Sacral/bilateral Buttocks, posterior upper thigh deep tissue injury present on admission  Incontinence of bowel and bladder  Incontinence Dermatitis    Recommend:  1.) topical therapy: sacral/buttock, posterior upper thighs - cleanse with incontinence cleanser, pat dry, apply Charo ointment BID and PRN for incontinent episodes  2.) Incontinence Management - incontinence cleanser, pads, pericare BID, continue female external urinary catheter  3.) Maintain on an alternating air with low air loss surface  4.) Turn and reposition Q 2 hours  5.) Nutrition optimization   6.) Offload heels/feet with complete cair air fluidized boots/pillows; ensure that the soles of the feet are not resting on the foot board of the bed.    Care as per medicine. Will not actively follow but will remain available. Please recall for new issues or deterioration.  Upon discharge [facility] [address] [place] [address] [phone]  Thank you for this consult  Discussed with clinical nurse  Josey Best, DEMARCO-C, CWOCN via TEAMS

## 2024-01-30 NOTE — PHYSICAL THERAPY INITIAL EVALUATION ADULT - ADDITIONAL COMMENTS
Moderna dose 1 and 2 PTA pt states she was transferring with 1 assist from bed <-> wheelchair, uses wheelchair for mobility. HHA assists with all mobility & ADLs. Pt has HHA from 7:30/8am till 5:30/6:30 pm for 7 days/week, HHA assists her in the morning to get OOB to w/c, then assists her back to bed in evening. Pt takes sponge baths, owns wheelchair, rolling walker, lift device (does not use it).

## 2024-01-30 NOTE — PHYSICAL THERAPY INITIAL EVALUATION ADULT - GENERAL OBSERVATIONS, REHAB EVAL
Pt a/w cough, diarrhea, has h/o COPD on 3L O2 at home, primarily wheelchair bound at home, can transfer with 1 assist. Pt received semi-supine on stretcher in ED in NAD, +3L O2 NC, +IV, A&Ox3, agreeable to PT, however c/o stiffness & pain in LE & low back.

## 2024-01-30 NOTE — PHYSICAL THERAPY INITIAL EVALUATION ADULT - MANUAL MUSCLE TESTING RESULTS, REHAB EVAL
UE grossly assessed to be at least 3/5, LE grossly assessed to be at least 2/5 at hip & knee, 3/5 at ankle/grossly assessed due to Strength is at least 3/5 through extremities/grossly assessed due to

## 2024-01-30 NOTE — PROGRESS NOTE ADULT - ASSESSMENT
_________________________________________________________________________________________  ========>>  M E D I C A L   A T T E N D I N G    F O L L O W  U P  N O T E  <<=========  -----------------------------------------------------------------------------------------------------    - Patient seen and examined by me earlier today.   - In summary,  SHEKHAR VASQUEZ is a 73y year old woman admitted with diarrhea, dehydration, SOB,  ALDAIR..   - Patient today overall doing ok, comfortable, eating OK.   diarrhea improved, SOB resolved per patient ..        patient is nercvous ( + baseline anxiety d/o) .. patient reassured ..     ==================>> REVIEW OF SYSTEM <<=================    GEN: no fever, no chills, no pain, poor appetite > encouraged hydration, soup, binding food.. ..   RESP: no SOB, no cough, no sputum  CVS: no chest pain, no palpitations  GI: no abdominal pain, no nausea, as above   : no dysuria, no frequency  Neuro: no headache, no dizziness    ==================>> PHYSICAL EXAM <<=================    GEN: A&O X 3 , NAD , comfortable, pleasant, a bit nervous as above   HEENT: NCAT, PERRL, MMM, hearing intact  CVS: S1S2 , regular , No M/R/G appreciated  PULM: CTA B/L,  limited exam as not taking deep breaths, limited exam due to body habitus.   ABD.: soft. non tender, non distended,  obese   Extrem: intact pulses , chronic lymphedema and chronic changes           ( Note written / Date of service 01-30-24 ( This is certified to be the same as "ENTERED" date above ( for billing purposes)))    ==================>> MEDICATIONS <<====================    MEDICATIONS  (STANDING):  albuterol    90 MICROgram(s) HFA Inhaler 2 Puff(s) Inhalation every 6 hours  albuterol/ipratropium for Nebulization 3 milliLiter(s) Nebulizer every 20 minutes  albuterol/ipratropium for Nebulization 3 milliLiter(s) Nebulizer every 6 hours  amLODIPine   Tablet 5 milliGRAM(s) Oral daily  aspirin enteric coated 81 milliGRAM(s) Oral daily  atorvastatin 40 milliGRAM(s) Oral at bedtime  buDESOnide    Inhalation Suspension 0.5 milliGRAM(s) Inhalation two times a day  busPIRone 10 milliGRAM(s) Oral two times a day  DULoxetine 60 milliGRAM(s) Oral daily  gabapentin 100 milliGRAM(s) Oral two times a day  heparin   Injectable 5000 Unit(s) SubCutaneous every 12 hours  hydrALAZINE 50 milliGRAM(s) Oral three times a day  levothyroxine 125 MICROGram(s) Oral daily  montelukast 10 milliGRAM(s) Oral daily  OLANZapine 10 milliGRAM(s) Oral at bedtime  predniSONE   Tablet 30 milliGRAM(s) Oral daily  propranolol 20 milliGRAM(s) Oral two times a day  sodium chloride 0.9%. 1000 milliLiter(s) (75 mL/Hr) IV Continuous <Continuous>  tiotropium 2.5 MICROgram(s) Inhaler 2 Puff(s) Inhalation daily    MEDICATIONS  (PRN):  acetaminophen     Tablet .. 650 milliGRAM(s) Oral every 6 hours PRN Temp greater or equal to 38C (100.4F), Mild Pain (1 - 3)  aluminum hydroxide/magnesium hydroxide/simethicone Suspension 30 milliLiter(s) Oral every 4 hours PRN Dyspepsia  melatonin 3 milliGRAM(s) Oral at bedtime PRN Insomnia  ondansetron Injectable 4 milliGRAM(s) IV Push every 8 hours PRN Nausea and/or Vomiting    ___________  Active diet:  Diet, Regular:   DASH/TLC Sodium & Cholesterol Restricted (DASH)  ___________________    ==================>> VITAL SIGNS <<==================    T(C): 36.5 (01-30-24 @ 06:18), Max: 37 (01-29-24 @ 17:56)  HR: 91 (01-30-24 @ 06:18) (86 - 111)  BP: 123/66 (01-30-24 @ 06:18) (88/53 - 138/104)  BP(mean): 65 (01-30-24 @ 01:31)  RR: 18 (01-30-24 @ 06:18) (18 - 22)  SpO2: 96% (01-30-24 @ 06:18) (94% - 100%)      ==================>> LAB AND IMAGING <<==================                        13.2   19.08 )-----------( 132      ( 30 Jan 2024 08:51 )             40.6        01-30    134<L>  |  93<L>  |  154<H>  ----------------------------<  137<H>  4.4   |  20<L>  |  2.28<H>    Creatinine:  2.28  <<==, 2.65  <<==    Ca    8.7      30 Jan 2024 08:51    TPro  6.6  /  Alb  3.4  /  TBili  0.9  /  DBili  x   /  AST  24  /  ALT  17  /  AlkPhos  94  01-29    PT/INR - ( 30 Jan 2024 08:51 )   PT: 11.3 sec;   INR: 1.08 ratio    PTT - ( 29 Jan 2024 18:05 )  PTT:45.6 sec              viral URI pannel negative    awaiting stool studies    ___________________________________________________________________________________  ===============>>  A S S E S S M E N T   A N D   P L A N <<===============  ------------------------------------------------------------------------------------------    · Assessment	  73y F PMH COPD on 3 L NC at home, hypothyroid, diastolic CHF on Lasix, breast cancer s/p surgery, asthma, chronic lymphedema, kidney cancer  s/p partial nephrectomy, wheelchair-bound, obesity presenting for diarrhea found to have ALDAIR, admit fo further management       Problem/Plan - 1:  ·  Problem: Diarrhea.   ·  Plan: - P/w 2 days of diarrhea, poor intake, weakness  - Recent hospitalization w/abx use , C/f C.diff  - Stool studies, GI - PCR, C.diff cx  - Contact precaution   - trend leukocytosis       no other G symptoms reported, patient tolerating diet   - Supportive care.    Problem/Plan - 2:  ·  Problem: ALDAIR (acute kidney injury).   ·  Plan: P/w 2 days of diarrhea, poor intake, weakness  Hx/o kidney Ca s/p partial nephrectomy   - Cr 2.65, (baseline ~ 0.5 -0.6)  - ALDAIR iso dehydration from diarrhea   - post IVF >> encourage PO hydration   - Trend labs, monitor renal function >> slowly improving     monitor uremia   - Avoid nephrotoxic meds.     holding diuretics    Problem/Plan - 3:  ·  Problem: Chronic diastolic heart failure.   ·  Plan: - I&O, daily  wt  - Hold Lasix, ACE-I given profound ALDAIR   - C/w Statin.  - cardio following    Problem/Plan - 4:  ·  Problem: HTN (hypertension).     monitor BP closely and adjust medications as needed  cardio following    Problem/Plan - 5:  ·  Problem: HLD (hyperlipidemia).   ·  Plan: - c/w Statin.    Problem/Plan - 6:  ·  Problem: COPD with asthma.       possible mild COPD exacerbation  on admission  ·  Plan: - c/w inhaler, steroids.    patient also on prednisone as ordered     Problem/Plan - 7:  ·  Problem: Hypothyroid.   ·  Plan: - c/w Levothyroxine.    Problem/Plan - 8:  ·  Problem: Prophylactic measure.   ·  Plan: - DVT ppx: Heparin SQ   - Diet: DASH  - PT consult.    --------------------------------------------  Case discussed with patient..   Education given on findings and plan of care  ___________________________  H. JANICE Welch.  Pager: [phone]

## 2024-01-30 NOTE — PHYSICAL THERAPY INITIAL EVALUATION ADULT - PERTINENT HX OF CURRENT PROBLEM, REHAB EVAL
73y F PMH COPD on 3 L NC at home, hypothyroid, diastolic CHF on Lasix, breast cancer s/p surgery, asthma, chronic lymphedema, kidney cancer  s/p partial nephrectomy, wheelchair-bound, obesity presenting for cough and diarrhea.  Patient states that she had diarrhea about 3 episodes per day for the past 2 days and has had continued dry cough for the past month.  Patient was recently admitted from January 13 to January 20 for SOB admitted with human metapneumovirus and acute COPD exacerbation.  Patient states that a day after she went home she started feeling sick again.  Denies fever, chest pain, shortness of breath, abdominal pain, nausea, vomiting, dysuria, constipation.  Endorses poor appetite  decreased p.o. intake.  Feels weak is now unable to transfer from her wheelchair to the bed as easily as before.

## 2024-01-30 NOTE — PROVIDER CONTACT NOTE (CRITICAL VALUE NOTIFICATION) - ASSESSMENT
Pt is A & Ox4. On 4 L of oxygen NC. Vital Signs Stable . Patient is complaining of chest pain. EKG was done, stat labs: troponin and ckmb were performed.

## 2024-01-30 NOTE — PHYSICAL THERAPY INITIAL EVALUATION ADULT - TRANSFER TRAINING, PT EVAL
GOAL: Pt will perform all transfers from bed <-> wheelchair with min assist in 2 weeks. GOAL: Pt will perform all transfers from bed <-> wheelchair with prior level of assist in 2 weeks.

## 2024-01-30 NOTE — CONSULT NOTE ADULT - SUBJECTIVE AND OBJECTIVE BOX
CHIEF COMPLAINT: sob, diarrhea    HISTORY OF PRESENT ILLNESS:  73y F PMH COPD on 3 L NC at home, hypothyroid, diastolic CHF on Lasix, breast cancer s/p surgery, asthma, chronic lymphedema, kidney cancer  s/p partial nephrectomy, wheelchair-bound, obesity presenting for cough and diarrhea.  Patient states that she had diarrhea about 3 episodes per day for the past 2 days and has had continued dry cough for the past month.  Patient was recently admitted from  to  for SOB admitted with human metapneumovirus and acute COPD exacerbation.  Patient states that a day after she went home she started feeling sick again.  Denies fever, chest pain, shortness of breath, abdominal pain, nausea, vomiting, dysuria, constipation.  Endorses poor appetite  decreased p.o. intake.  Feels weak is now unable to transfer from her wheelchair to the bed as easily as before.     ROS: Denies CP, SOB, palpitation, N/V, fever, chills, dizziness, abm pain, recent travel, change in urinary habits       Allergies    No Known Drug Allergies  shellfish (Hives)  Grapes (Hives)  Peaches (Hives)    Intolerances    	    MEDICATIONS:  amLODIPine   Tablet 5 milliGRAM(s) Oral daily  aspirin enteric coated 81 milliGRAM(s) Oral daily  heparin   Injectable 5000 Unit(s) SubCutaneous every 12 hours  hydrALAZINE 50 milliGRAM(s) Oral three times a day  propranolol 20 milliGRAM(s) Oral two times a day      albuterol    90 MICROgram(s) HFA Inhaler 2 Puff(s) Inhalation every 6 hours  albuterol/ipratropium for Nebulization 3 milliLiter(s) Nebulizer every 20 minutes  albuterol/ipratropium for Nebulization 3 milliLiter(s) Nebulizer every 6 hours  buDESOnide    Inhalation Suspension 0.5 milliGRAM(s) Inhalation two times a day  montelukast 10 milliGRAM(s) Oral daily  tiotropium 2.5 MICROgram(s) Inhaler 2 Puff(s) Inhalation daily    acetaminophen     Tablet .. 650 milliGRAM(s) Oral every 6 hours PRN  busPIRone 10 milliGRAM(s) Oral two times a day  DULoxetine 60 milliGRAM(s) Oral daily  gabapentin 100 milliGRAM(s) Oral two times a day  melatonin 3 milliGRAM(s) Oral at bedtime PRN  OLANZapine 10 milliGRAM(s) Oral at bedtime  ondansetron Injectable 4 milliGRAM(s) IV Push every 8 hours PRN    aluminum hydroxide/magnesium hydroxide/simethicone Suspension 30 milliLiter(s) Oral every 4 hours PRN    atorvastatin 40 milliGRAM(s) Oral at bedtime  levothyroxine 125 MICROGram(s) Oral daily  predniSONE   Tablet 30 milliGRAM(s) Oral daily    sodium chloride 0.9%. 1000 milliLiter(s) IV Continuous <Continuous>      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      History of carpal tunnel release  right wrist      History of parathyroidectomy      S/P laminectomy  now bed and wheelchair ridden with severe pain          FAMILY HISTORY:  Family history of myocardial infarction  mother  at age 67 and father at age 67 of MI's    Family history of hypertension  mother and father    Family history of diabetes mellitus (Sibling)  siblings    Family history of heart disease (Sibling)  brother has a PPM    Family history of thyroid cancer (Child)  daughter has thyroid cancer        SOCIAL HISTORY:    non smoker      REVIEW OF SYSTEMS:  See HPI, otherwise complete 10 point review of systems negative    [ ] All others negative	      PHYSICAL EXAM:  T(C): 36.5 (24 @ 06:18), Max: 37 (24 @ 17:56)  HR: 91 (24 @ 06:18) (86 - 111)  BP: 123/66 (24 @ 06:18) (88/53 - 138/104)  RR: 18 (24 @ 06:18) (18 - 22)  SpO2: 96% (24 @ 06:18) (94% - 100%)  Wt(kg): --  I&O's Summary      Appearance: No Acute Distress	  HEENT:  Normal oral mucosa, PERRL, EOMI	  Cardiovascular: Normal S1 S2, No JVD, No murmurs/rubs/gallops  Respiratory: Lungs clear to auscultation bilaterally  Gastrointestinal:  Soft, Non-tender, + BS	  Skin: No rashes, No ecchymoses, No cyanosis	  Neurologic: Non-focal  Extremities: No clubbing, cyanosis or edema  Vascular: Peripheral pulses palpable 2+ bilaterally  Psychiatry: A & O x 3, Mood & affect appropriate    Laboratory Data:	 	    CBC Full  -  ( 2024 18:05 )  WBC Count : 19.54 K/uL  Hemoglobin : 13.6 g/dL  Hematocrit : 42.4 %  Platelet Count - Automated : 152 K/uL  Mean Cell Volume : 92.4 fl  Mean Cell Hemoglobin : 29.6 pg  Mean Cell Hemoglobin Concentration : 32.1 gm/dL  Auto Neutrophil # : 17.02 K/uL  Auto Lymphocyte # : 1.17 K/uL  Auto Monocyte # : 1.35 K/uL  Auto Eosinophil # : 0.00 K/uL  Auto Basophil # : 0.00 K/uL  Auto Neutrophil % : 87.1 %  Auto Lymphocyte % : 6.0 %  Auto Monocyte % : 6.9 %  Auto Eosinophil % : 0.0 %  Auto Basophil % : 0.0 %        130<L>  |  89<L>  |  155<H>  ----------------------------<  127<H>  4.5   |  21<L>  |  2.65<H>    Ca    9.4      2024 18:05    TPro  6.6  /  Alb  3.4  /  TBili  0.9  /  DBili  x   /  AST  24  /  ALT  17  /  AlkPhos  94        proBNP:   Lipid Profile:   HgA1c:   TSH:       CARDIAC MARKERS:            Interpretation of Telemetry: 	    ECG:  	  RADIOLOGY:  OTHER: 	    PREVIOUS DIAGNOSTIC TESTING:    [ ] Echocardiogram:  [ ] Catheterization:  [ ] Stress Test:  	    Assessment:  73y F PMH COPD on 3 L NC at home, hypothyroid, diastolic CHF on Lasix, breast cancer s/p surgery, asthma, chronic lymphedema, kidney cancer  s/p partial nephrectomy, wheelchair-bound, obesity presenting for cough and diarrhea.  Patient states that she had diarrhea about 3 episodes per day for the past 2 days and has had continued dry cough for the past month.  Patient was recently admitted from  to  for SOB admitted with human metapneumovirus and acute COPD exacerbation.  Patient states that a day after she went home she started feeling sick again.  Denies fever, chest pain, shortness of breath, abdominal pain, nausea, vomiting, dysuria, constipation.  Endorses poor appetite  decreased p.o. intake.  Feels weak is now unable to transfer from her wheelchair to the bed as easily as before.     Recs:  cardiac stable  dyspnea resolved  recent TTE (tds) --> wnl  cw po lasix to maintain euvolemic  leg elevation and ACE wraps to legs for lymphedema/venous insufficiency  workup and tx of diarrhea per primary team  will follow  dvt ppx        Greater than 60 minutes spent on total encounter; more than 50% of the visit was spent counseling and/or coordinating care by the attending physician.   	  Juan Salcedo MD   Cardiovascular Diseases  [phone]    
Wound Surgery Consult Note:    HPI:  73y F PMH COPD on 3 L NC at home, hypothyroid, diastolic CHF on Lasix, breast cancer s/p surgery, asthma, chronic lymphedema, kidney cancer  s/p partial nephrectomy, wheelchair-bound, obesity presenting for cough and diarrhea.  Patient states that she had diarrhea about 3 episodes per day for the past 2 days and has had continued dry cough for the past month.  Patient was recently admitted from  to  for SOB admitted with human metapneumovirus and acute COPD exacerbation.  Patient states that a day after she went home she started feeling sick again.  Denies fever, chest pain, shortness of breath, abdominal pain, nausea, vomiting, dysuria, constipation.  Endorses poor appetite  decreased p.o. intake.  Feels weak is now unable to transfer from her wheelchair to the bed as easily as before.  (2024 22:28)    Request for wound care consult for sacral/bilateral buttocks skin injury received from nursing. Ms. To was encountered in the ED. She is grossly incontinent of stool and urine at this time and reported recent multiple episodes of diarrhea. Darkened, discolored skin noted on sacrum/bilateral buttocks and posterior upper thigh and perianal skin noted. Due to patient's current immobility, cannot rule out a deep tissue injury at this time. She required total assistance to turn/shift in bed. Her extreme immobility, inactivity, incontinence of stool and urine and poor nutritional status all contribute to her high risk for pressure injury development and hinder healing.  Pressure Injury prevention and management interventions including but not limited to turning and repositioning discussed with patient.    PAST MEDICAL & SURGICAL HISTORY:  Hypertension  Hyperlipidemia  Anxiety  Graves disease  Kidney cancer, primary, with metastasis from kidney to other site, left  LEft sided- s/p nephrectomy only- no chemo or RT  Breast cancer in situ, left  COPD (chronic obstructive pulmonary disease)  Hypothyroid  DVT (deep venous thrombosis), history of- not presently on A/C  Obesity  Sleep apnea  Asthma  S/P cholecystectomy  S/p nephrectomy, left  S/P breast biopsy, left  History of pelvic surgery, Pelvic Sling  History of eye surgery, 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  History of carpal tunnel release, right wrist  History of parathyroidectomy  S/P laminectomy, now bed and wheelchair ridden with severe pain    REVIEW OF SYSTEMS:  General: + weakness, no fever/chills, no weight loss/gain  Skin/Breast: no rash, no jaundice  Ophthalmologic: no vision changes, no dry eyes   Respiratory and Thorax: + cough, no wheezing, no hemoptysis, no dyspnea  Cardiovascular: no chest pain, no shortness of breath, no orthopnea  Gastrointestinal: no n/v. +diarrhea, no abdominal pain, no dysphagia   Genitourinary: no dysuria, no frequency, no nocturia, no hematuria  Musculoskeletal: no trauma, no sprain/strain, no myalgias, no arthralgias, no fracture  Neurological: no HA, no dizziness, + weakness, no numbness  Psychiatric: no depression, no SI/HI  Hematology/Lymphatics: no easy bruising  Endocrine: no heat or cold intolerance. no weight gain or loss  Allergic/Immunologic: no allergy or recent reaction     MEDICATIONS  (STANDING):  albuterol    90 MICROgram(s) HFA Inhaler 2 Puff(s) Inhalation every 6 hours  albuterol/ipratropium for Nebulization 3 milliLiter(s) Nebulizer every 20 minutes  albuterol/ipratropium for Nebulization 3 milliLiter(s) Nebulizer every 6 hours  amLODIPine   Tablet 5 milliGRAM(s) Oral daily  aspirin enteric coated 81 milliGRAM(s) Oral daily  atorvastatin 40 milliGRAM(s) Oral at bedtime  buDESOnide    Inhalation Suspension 0.5 milliGRAM(s) Inhalation two times a day  busPIRone 10 milliGRAM(s) Oral two times a day  DULoxetine 60 milliGRAM(s) Oral daily  gabapentin 100 milliGRAM(s) Oral two times a day  heparin   Injectable 5000 Unit(s) SubCutaneous every 12 hours  hydrALAZINE 50 milliGRAM(s) Oral three times a day  levothyroxine 125 MICROGram(s) Oral daily  montelukast 10 milliGRAM(s) Oral daily  OLANZapine 10 milliGRAM(s) Oral at bedtime  predniSONE   Tablet 30 milliGRAM(s) Oral daily  propranolol 20 milliGRAM(s) Oral two times a day  sodium chloride 0.9%. 1000 milliLiter(s) (75 mL/Hr) IV Continuous <Continuous>  tiotropium 2.5 MICROgram(s) Inhaler 2 Puff(s) Inhalation daily    MEDICATIONS  (PRN):  acetaminophen     Tablet .. 650 milliGRAM(s) Oral every 6 hours PRN Temp greater or equal to 38C (100.4F), Mild Pain (1 - 3)  aluminum hydroxide/magnesium hydroxide/simethicone Suspension 30 milliLiter(s) Oral every 4 hours PRN Dyspepsia  melatonin 3 milliGRAM(s) Oral at bedtime PRN Insomnia  ondansetron Injectable 4 milliGRAM(s) IV Push every 8 hours PRN Nausea and/or Vomiting    Allergies    No Known Drug Allergies  shellfish (Hives)  Grapes (Hives)  Peaches (Hives)    Intolerances    SOCIAL HISTORY:  , Denies smoking, ETOH, drugs    FAMILY HISTORY:  Family history of myocardial infarction  mother  at age 67 and father at age 67 of MI's    Family history of hypertension  mother and father    Family history of diabetes mellitus (Sibling)  siblings    Family history of heart disease (Sibling)  brother has a PPM    Family history of thyroid cancer (Child)  daughter has thyroid cancer    Vital Signs Last 24 Hrs  T(C): 36.5 (2024 06:18), Max: 37 (2024 17:56)  T(F): 97.7 (2024 06:18), Max: 98.6 (2024 17:56)  HR: 91 (2024 06:18) (86 - 111)  BP: 123/66 (2024 06:18) (88/53 - 138/104)  BP(mean): 65 (2024 01:31) (65 - 70)  RR: 18 (2024 06:18) (18 - 22)  SpO2: 96% (2024 06:18) (94% - 100%)    Parameters below as of 2024 06:18  Patient On (Oxygen Delivery Method): nasal cannula  O2 Flow (L/min): 4    Physical Exam:  General: Alert & Oriented x 3, obese  Ophthamology: sclera clear  ENMT: moist mucous membranes, trachea midline  Respiratory: equal chest rise with respirations  Gastrointestinal: soft NT/ND  Neurology: verbal,  following commands  Psych: calm, appropriate  Musculoskeletal: no contractures  Vascular: BLE edema equal  Skin:  Sacral/bilateral buttocks with deep purple/maroon discolored intact skin in and around the gluteal cleft a L 5cm X W 4cm x D none, no drainage  periwound skin with patches of hyperpigmented intact skin  No odor, erythema, increased warmth, tenderness, induration, fluctuance    LABS:      134<L>  |  93<L>  |  154<H>  ----------------------------<  137<H>  4.4   |  20<L>  |  2.28<H>    Ca    8.7      2024 08:51    TPro  6.6  /  Alb  3.4  /  TBili  0.9  /  DBili  x   /  AST  24  /  ALT  17  /  AlkPhos  94                            13.2   19.08 )-----------( 132      ( 2024 08:51 )             40.6     PT/INR - ( 2024 08:51 )   PT: 11.3 sec;   INR: 1.08 ratio         PTT - ( 2024 18:05 )  PTT:45.6 sec  Urinalysis Basic - ( 2024 08:51 )    Color: x / Appearance: x / SG: x / pH: x  Gluc: 137 mg/dL / Ketone: x  / Bili: x / Urobili: x   Blood: x / Protein: x / Nitrite: x   Leuk Esterase: x / RBC: x / WBC x   Sq Epi: x / Non Sq Epi: x / Bacteria: x

## 2024-01-30 NOTE — PHYSICAL THERAPY INITIAL EVALUATION ADULT - IMPAIRMENTS CONTRIBUTING IMPAIRED BED MOBILITY, REHAB EVAL
impaired balance/impaired coordination/impaired motor control/pain/impaired postural control/decreased ROM/decreased strength

## 2024-01-30 NOTE — PHYSICAL THERAPY INITIAL EVALUATION ADULT - RANGE OF MOTION EXAMINATION, REHAB EVAL
LE AROM limited due to stiffness & pain/bilateral upper extremity ROM was WFL (within functional limits)/bilateral lower extremity ROM was WFL (within functional limits)

## 2024-01-30 NOTE — PHYSICAL THERAPY INITIAL EVALUATION ADULT - LEVEL OF INDEPENDENCE: SUPINE/SIT, REHAB EVAL
attempted-limited by pain, performed semi-supine to long sit with min assist using bed rails moderate assist (50% patients effort)

## 2024-01-30 NOTE — PHYSICAL THERAPY INITIAL EVALUATION ADULT - PLANNED THERAPY INTERVENTIONS, PT EVAL
balance training/bed mobility training/transfer training balance training/bed mobility training/strengthening/transfer training

## 2024-01-30 NOTE — CHART NOTE - NSCHARTNOTEFT_GEN_A_CORE
Notified by RN that patient complaining of chest pain   Patient seen and examined at bedside; patient reports 7/10 chest pain, denies radiation of pain or shortness of breath    T(C): 36.4 (01-30-24 @ 12:25), Max: 37 (01-29-24 @ 17:56)  HR: 85 (01-30-24 @ 12:25) (85 - 111)  BP: 136/84 (01-30-24 @ 12:25) (88/53 - 138/104)  RR: 16 (01-30-24 @ 12:25) (16 - 22)  SpO2: 97% (01-30-24 @ 12:25) (94% - 100%)    CONSTITUTIONAL: Well groomed, no apparent distress  RESP: No respiratory distress, no use of accessory muscles; CTA b/l, no WRR  CV: RRR, +S1S2, no MRG; no JVD; no peripheral edema  GI: Soft, NT, ND, no rebound, no guarding; no palpable masses; no hepatosplenomegaly; no hernia palpated    Assessment  73y F PMH COPD on 3 L NC at home, hypothyroid, diastolic CHF on Lasix, breast cancer s/p surgery, asthma, chronic lymphedema, kidney cancer  s/p partial nephrectomy, wheelchair-bound, obesity presenting for diarrhea found to have ALDAIR, admit fo further management. Patient now complaining of chest pain     Plan  >12 lead EKG ordered   >Cardiac enzymes ordered   >Continue tylenol for pain control   >Dr. Yuri Horne PA-C  Dept of Medicine

## 2024-01-31 LAB
ANION GAP SERPL CALC-SCNC: 15 MMOL/L — SIGNIFICANT CHANGE UP (ref 5–17)
BUN SERPL-MCNC: 110 MG/DL — HIGH (ref 7–23)
CALCIUM SERPL-MCNC: 9.3 MG/DL — SIGNIFICANT CHANGE UP (ref 8.4–10.5)
CHLORIDE SERPL-SCNC: 101 MMOL/L — SIGNIFICANT CHANGE UP (ref 96–108)
CO2 SERPL-SCNC: 24 MMOL/L — SIGNIFICANT CHANGE UP (ref 22–31)
CREAT SERPL-MCNC: 1.24 MG/DL — SIGNIFICANT CHANGE UP (ref 0.5–1.3)
EGFR: 45 ML/MIN/1.73M2 — LOW
GLUCOSE SERPL-MCNC: 106 MG/DL — HIGH (ref 70–99)
HCT VFR BLD CALC: 40.8 % — SIGNIFICANT CHANGE UP (ref 34.5–45)
HGB BLD-MCNC: 12.9 G/DL — SIGNIFICANT CHANGE UP (ref 11.5–15.5)
MCHC RBC-ENTMCNC: 29.8 PG — SIGNIFICANT CHANGE UP (ref 27–34)
MCHC RBC-ENTMCNC: 31.6 GM/DL — LOW (ref 32–36)
MCV RBC AUTO: 94.2 FL — SIGNIFICANT CHANGE UP (ref 80–100)
NRBC # BLD: 0 /100 WBCS — SIGNIFICANT CHANGE UP (ref 0–0)
PLATELET # BLD AUTO: 135 K/UL — LOW (ref 150–400)
POTASSIUM SERPL-MCNC: 3.6 MMOL/L — SIGNIFICANT CHANGE UP (ref 3.5–5.3)
POTASSIUM SERPL-SCNC: 3.6 MMOL/L — SIGNIFICANT CHANGE UP (ref 3.5–5.3)
RBC # BLD: 4.33 M/UL — SIGNIFICANT CHANGE UP (ref 3.8–5.2)
RBC # FLD: 15.2 % — HIGH (ref 10.3–14.5)
SODIUM SERPL-SCNC: 140 MMOL/L — SIGNIFICANT CHANGE UP (ref 135–145)
WBC # BLD: 19.4 K/UL — HIGH (ref 3.8–10.5)
WBC # FLD AUTO: 19.4 K/UL — HIGH (ref 3.8–10.5)

## 2024-01-31 RX ADMIN — ATORVASTATIN CALCIUM 40 MILLIGRAM(S): 80 TABLET, FILM COATED ORAL at 22:04

## 2024-01-31 RX ADMIN — OLANZAPINE 10 MILLIGRAM(S): 15 TABLET, FILM COATED ORAL at 22:05

## 2024-01-31 RX ADMIN — MONTELUKAST 10 MILLIGRAM(S): 4 TABLET, CHEWABLE ORAL at 12:16

## 2024-01-31 RX ADMIN — Medication 0.5 MILLIGRAM(S): at 06:31

## 2024-01-31 RX ADMIN — HEPARIN SODIUM 5000 UNIT(S): 5000 INJECTION INTRAVENOUS; SUBCUTANEOUS at 17:50

## 2024-01-31 RX ADMIN — AMLODIPINE BESYLATE 5 MILLIGRAM(S): 2.5 TABLET ORAL at 06:30

## 2024-01-31 RX ADMIN — Medication 3 MILLILITER(S): at 06:31

## 2024-01-31 RX ADMIN — TIOTROPIUM BROMIDE 2 PUFF(S): 18 CAPSULE ORAL; RESPIRATORY (INHALATION) at 12:17

## 2024-01-31 RX ADMIN — Medication 10 MILLIGRAM(S): at 06:31

## 2024-01-31 RX ADMIN — Medication 125 MICROGRAM(S): at 06:30

## 2024-01-31 RX ADMIN — Medication 50 MILLIGRAM(S): at 08:40

## 2024-01-31 RX ADMIN — Medication 3 MILLILITER(S): at 00:02

## 2024-01-31 RX ADMIN — Medication 81 MILLIGRAM(S): at 12:15

## 2024-01-31 RX ADMIN — Medication 0.5 MILLIGRAM(S): at 17:50

## 2024-01-31 RX ADMIN — Medication 50 MILLIGRAM(S): at 22:04

## 2024-01-31 RX ADMIN — Medication 3 MILLILITER(S): at 12:15

## 2024-01-31 RX ADMIN — Medication 3 MILLILITER(S): at 17:49

## 2024-01-31 RX ADMIN — Medication 3 MILLILITER(S): at 23:49

## 2024-01-31 RX ADMIN — Medication 10 MILLIGRAM(S): at 17:51

## 2024-01-31 RX ADMIN — Medication 30 MILLIGRAM(S): at 06:30

## 2024-01-31 RX ADMIN — DULOXETINE HYDROCHLORIDE 60 MILLIGRAM(S): 30 CAPSULE, DELAYED RELEASE ORAL at 12:15

## 2024-01-31 RX ADMIN — GABAPENTIN 100 MILLIGRAM(S): 400 CAPSULE ORAL at 17:50

## 2024-01-31 RX ADMIN — HEPARIN SODIUM 5000 UNIT(S): 5000 INJECTION INTRAVENOUS; SUBCUTANEOUS at 06:31

## 2024-01-31 RX ADMIN — GABAPENTIN 100 MILLIGRAM(S): 400 CAPSULE ORAL at 06:31

## 2024-01-31 RX ADMIN — Medication 3 MILLIGRAM(S): at 22:04

## 2024-01-31 NOTE — PROGRESS NOTE ADULT - ASSESSMENT
_________________________________________________________________________________________  ========>>  M E D I C A L   A T T E N D I N G    F O L L O W  U P  N O T E  <<=========  -----------------------------------------------------------------------------------------------------    - Patient seen and examined by me earlier today.   - In summary,  SHEKHAR VASQUEZ is a 73y year old woman admitted with diarrhea, dehydration, SOB,  ALDAIR..   - Patient today overall doing ok, comfortable, eating OK.   diarrhea improved, SOB resolved per patient ..        patient is nercvous ( + baseline anxiety d/o) .. patient reassured ..     ==================>> REVIEW OF SYSTEM <<=================    GEN: no fever, no chills, no pain, poor appetite > encouraged hydration, soup, binding food.. ..   RESP: no SOB, no cough, no sputum  CVS: no chest pain, no palpitations  GI: no abdominal pain, no nausea, as above   : no dysuria, no frequency  Neuro: no headache, no dizziness    ==================>> PHYSICAL EXAM <<=================    GEN: A&O X 3 , NAD , comfortable, pleasant, a bit nervous as above   HEENT: NCAT, PERRL, MMM, hearing intact  CVS: S1S2 , regular , No M/R/G appreciated  PULM: CTA B/L,  limited exam as not taking deep breaths, limited exam due to body habitus.   ABD.: soft. non tender, non distended,  obese   Extrem: intact pulses , chronic lymphedema and chronic changes         ( Note written / Date of service 01-31-24 ( This is certified to be the same as "ENTERED" date above ( for billing purposes)))    ==================>> MEDICATIONS <<====================    albuterol    90 MICROgram(s) HFA Inhaler 2 Puff(s) Inhalation every 6 hours  albuterol/ipratropium for Nebulization 3 milliLiter(s) Nebulizer every 20 minutes  albuterol/ipratropium for Nebulization 3 milliLiter(s) Nebulizer every 6 hours  amLODIPine   Tablet 5 milliGRAM(s) Oral daily  aspirin enteric coated 81 milliGRAM(s) Oral daily  atorvastatin 40 milliGRAM(s) Oral at bedtime  buDESOnide    Inhalation Suspension 0.5 milliGRAM(s) Inhalation two times a day  busPIRone 10 milliGRAM(s) Oral two times a day  DULoxetine 60 milliGRAM(s) Oral daily  gabapentin 100 milliGRAM(s) Oral two times a day  heparin   Injectable 5000 Unit(s) SubCutaneous every 12 hours  hydrALAZINE 50 milliGRAM(s) Oral three times a day  influenza  Vaccine (HIGH DOSE) 0.7 milliLiter(s) IntraMuscular once  levothyroxine 125 MICROGram(s) Oral daily  montelukast 10 milliGRAM(s) Oral daily  OLANZapine 10 milliGRAM(s) Oral at bedtime  predniSONE   Tablet 30 milliGRAM(s) Oral daily  propranolol 20 milliGRAM(s) Oral two times a day  sodium chloride 0.9%. 1000 milliLiter(s) IV Continuous <Continuous>  tiotropium 2.5 MICROgram(s) Inhaler 2 Puff(s) Inhalation daily    MEDICATIONS  (PRN):  acetaminophen     Tablet .. 650 milliGRAM(s) Oral every 6 hours PRN Temp greater or equal to 38C (100.4F), Mild Pain (1 - 3)  aluminum hydroxide/magnesium hydroxide/simethicone Suspension 30 milliLiter(s) Oral every 4 hours PRN Dyspepsia  melatonin 3 milliGRAM(s) Oral at bedtime PRN Insomnia  ondansetron Injectable 4 milliGRAM(s) IV Push every 8 hours PRN Nausea and/or Vomiting    ___________  Active diet:  Diet, Regular:   DASH/TLC Sodium & Cholesterol Restricted (DASH)  ___________________    ==================>> VITAL SIGNS <<==================  Height (cm): 157.5  Weight (kg): 91.6  BMI (kg/m2): 36.9  Vital Signs Last 24 HrsT(C): 36.6 (01-31-24 @ 09:17)  T(F): 97.8 (01-31-24 @ 09:17), Max: 97.8 (01-30-24 @ 21:25)  HR: 68 (01-31-24 @ 09:17) (68 - 82)  BP: 109/58 (01-31-24 @ 09:17)  RR: 17 (01-31-24 @ 09:17) (17 - 17)  SpO2: 93% (01-31-24 @ 09:17) (93% - 98%)      CAPILLARY BLOOD GLUCOSE         ==================>> LAB AND IMAGING <<==================                        12.9   19.40 )-----------( 135      ( 31 Jan 2024 07:12 )             40.8        01-31    140  |  101  |  110<H>  ----------------------------<  106<H>  3.6   |  24  |  1.24    Ca    9.3      31 Jan 2024 07:12    TPro  6.6  /  Alb  3.4  /  TBili  0.9  /  DBili  x   /  AST  24  /  ALT  17  /  AlkPhos  94  01-29    WBC count:   19.40 <<== ,  19.08 <<== ,  19.54 <<==   Hemoglobin:   12.9 <<==,  13.2 <<==,  13.6 <<==  platelets:  135 <==, 132 <==, 152 <==    Creatinine:  1.24  <<==, 2.28  <<==, 2.65  <<==  Sodium:   140  <==, 134  <==, 130  <==       AST:          24(01-29) <==      ALT:        17(01-29)  <==      AP:        94(01-29)  <=     Bili:        0.9(01-29)  <=    ____________________________    M I C R O B I O L O G Y :      SARS-CoV-2: Thais (01-13-24 @ 01:48)    viral URI pannel negative    awaiting stool studies    ___________________________________________________________________________________  ===============>>  A S S E S S M E N T   A N D   P L A N <<===============  ------------------------------------------------------------------------------------------    · Assessment	  73y F PMH COPD on 3 L NC at home, hypothyroid, diastolic CHF on Lasix, breast cancer s/p surgery, asthma, chronic lymphedema, kidney cancer  s/p partial nephrectomy, wheelchair-bound, obesity presenting for diarrhea found to have ALDAIR, admit fo further management       Problem/Plan - 1:  ·  Problem: Diarrhea.   ·  Plan: - P/w 2 days of diarrhea, poor intake, weakness  - Recent hospitalization w/abx use , C/f C.diff  - Stool studies, GI - PCR, C.diff cx  - Contact precaution   - trend leukocytosis       no other G symptoms reported, patient tolerating diet   - Supportive care.    Problem/Plan - 2:  ·  Problem: ALDAIR (acute kidney injury).   ·  Plan: P/w 2 days of diarrhea, poor intake, weakness  Hx/o kidney Ca s/p partial nephrectomy   - Cr 2.65, (baseline ~ 0.5 -0.6)  - ALDAIR iso dehydration from diarrhea   - post IVF >> encourage PO hydration   - Trend labs, monitor renal function >> slowly improving     monitor uremia   - Avoid nephrotoxic meds.     holding diuretics    Problem/Plan - 3:  ·  Problem: Chronic diastolic heart failure.   ·  Plan: - I&O, daily  wt  - Hold Lasix, ACE-I given profound ALDAIR   - C/w Statin.  - cardio following    Problem/Plan - 4:  ·  Problem: HTN (hypertension).     monitor BP closely and adjust medications as needed  cardio following    Problem/Plan - 5:  ·  Problem: HLD (hyperlipidemia).   ·  Plan: - c/w Statin.    Problem/Plan - 6:  ·  Problem: COPD with asthma.       possible mild COPD exacerbation  on admission  ·  Plan: - c/w inhaler, steroids.    patient also on prednisone as ordered     Problem/Plan - 7:  ·  Problem: Hypothyroid.   ·  Plan: - c/w Levothyroxine.    Problem/Plan - 8:  ·  Problem: Prophylactic measure.   ·  Plan: - DVT ppx: Heparin SQ   - Diet: DASH  - PT consult.    --------------------------------------------  Case discussed with patient..   Education given on findings and plan of care  ___________________________  HRenetta Welch D.O.  Pager: [phone]       _________________________________________________________________________________________  ========>>  M E D I C A L   A T T E N D I N G    F O L L O W  U P  N O T E  <<=========  -----------------------------------------------------------------------------------------------------    - Patient seen and examined by me earlier today.   - In summary,  SHEKHAR VASQUEZ is a 73y year old woman admitted with diarrhea, dehydration, SOB,  ALDAIR..   - Patient today overall doing better, comfortable, eating OK.  diarrhea And SOB resolved per patient ..     Patient denies any fever, chills, G.I., urinary, or other symptoms of infection, + Occasional cough, dry    ==================>> REVIEW OF SYSTEM <<=================    GEN: no fever, no chills, no pain  RESP: no SOB, no cough, no sputum  CVS: no chest pain, no palpitations  GI: no abdominal pain, no nausea, as above   : no dysuria, no frequency  Neuro: no headache, no dizziness    ==================>> PHYSICAL EXAM <<=================    GEN: A&O X 3 , NAD , comfortable, pleasant, Less nervous Today  HEENT: NCAT, PERRL, MMM, hearing intact  CVS: S1S2 , regular , No M/R/G appreciated  PULM: CTA B/L,  limited exam as not taking deep breaths, limited exam due to body habitus.   ABD.: soft. non tender, non distended,  obese   Extrem: intact pulses , chronic lymphedema and chronic changes         ( Note written / Date of service 01-31-24 ( This is certified to be the same as "ENTERED" date above ( for billing purposes)))    ==================>> MEDICATIONS <<====================    albuterol    90 MICROgram(s) HFA Inhaler 2 Puff(s) Inhalation every 6 hours  albuterol/ipratropium for Nebulization 3 milliLiter(s) Nebulizer every 20 minutes  albuterol/ipratropium for Nebulization 3 milliLiter(s) Nebulizer every 6 hours  amLODIPine   Tablet 5 milliGRAM(s) Oral daily  aspirin enteric coated 81 milliGRAM(s) Oral daily  atorvastatin 40 milliGRAM(s) Oral at bedtime  buDESOnide    Inhalation Suspension 0.5 milliGRAM(s) Inhalation two times a day  busPIRone 10 milliGRAM(s) Oral two times a day  DULoxetine 60 milliGRAM(s) Oral daily  gabapentin 100 milliGRAM(s) Oral two times a day  heparin   Injectable 5000 Unit(s) SubCutaneous every 12 hours  hydrALAZINE 50 milliGRAM(s) Oral three times a day  influenza  Vaccine (HIGH DOSE) 0.7 milliLiter(s) IntraMuscular once  levothyroxine 125 MICROGram(s) Oral daily  montelukast 10 milliGRAM(s) Oral daily  OLANZapine 10 milliGRAM(s) Oral at bedtime  predniSONE   Tablet 30 milliGRAM(s) Oral daily  propranolol 20 milliGRAM(s) Oral two times a day  sodium chloride 0.9%. 1000 milliLiter(s) IV Continuous <Continuous>  tiotropium 2.5 MICROgram(s) Inhaler 2 Puff(s) Inhalation daily    MEDICATIONS  (PRN):  acetaminophen     Tablet .. 650 milliGRAM(s) Oral every 6 hours PRN Temp greater or equal to 38C (100.4F), Mild Pain (1 - 3)  aluminum hydroxide/magnesium hydroxide/simethicone Suspension 30 milliLiter(s) Oral every 4 hours PRN Dyspepsia  melatonin 3 milliGRAM(s) Oral at bedtime PRN Insomnia  ondansetron Injectable 4 milliGRAM(s) IV Push every 8 hours PRN Nausea and/or Vomiting    ___________  Active diet:  Diet, Regular:   DASH/TLC Sodium & Cholesterol Restricted (DASH)  ___________________    ==================>> VITAL SIGNS <<==================  Height (cm): 157.5  Weight (kg): 91.6  BMI (kg/m2): 36.9  Vital Signs Last 24 HrsT(C): 36.6 (01-31-24 @ 09:17)  T(F): 97.8 (01-31-24 @ 09:17), Max: 97.8 (01-30-24 @ 21:25)  HR: 68 (01-31-24 @ 09:17) (68 - 82)  BP: 109/58 (01-31-24 @ 09:17)  RR: 17 (01-31-24 @ 09:17) (17 - 17)  SpO2: 93% (01-31-24 @ 09:17) (93% - 98%)       ==================>> LAB AND IMAGING <<==================                        12.9   19.40 )-----------( 135      ( 31 Jan 2024 07:12 )             40.8        01-31    140  |  101  |  110<H>  ----------------------------<  106<H>  3.6   |  24  |  1.24    Ca    9.3      31 Jan 2024 07:12    TPro  6.6  /  Alb  3.4  /  TBili  0.9  /  DBili  x   /  AST  24  /  ALT  17  /  AlkPhos  94  01-29    WBC count:   19.40 <<== ,  19.08 <<== ,  19.54 <<==   Hemoglobin:   12.9 <<==,  13.2 <<==,  13.6 <<==  platelets:  135 <==, 132 <==, 152 <==    Creatinine:  1.24  <<==, 2.28  <<==, 2.65  <<==  Sodium:   140  <==, 134  <==, 130  <==       AST:          24(01-29) <==      ALT:        17(01-29)  <==      AP:        94(01-29)  <=     Bili:        0.9(01-29)  <=    ____________________________    M I C R O B I O L O G Y :    SARS-CoV-2: NotDetec (01-13-24 @ 01:48)    viral URI pannel negative    awaiting stool studies    Pending cultures    ___________________________________________________________________________________  ===============>>  A S S E S S M E N T   A N D   P L A N <<===============  ------------------------------------------------------------------------------------------    · Assessment	  73y F PMH COPD on 3 L NC at home, hypothyroid, diastolic CHF on Lasix, breast cancer s/p surgery, asthma, chronic lymphedema, kidney cancer  s/p partial nephrectomy, wheelchair-bound, obesity presenting for diarrhea found to have ALDAIR, admit fo further management       Problem/Plan - 1:  ·  Problem: Diarrhea. >> Now resolved as per patient  - Recent hospitalization w/abx use , Check C.diff If diarrhea recurs  - Stool studies, GI - PCR, C.diff cx If more diarrhea  - trend leukocytosis       no other Infectious symptoms reported, patient tolerating diet , No fever  - Supportive care.    Problem/Plan - 2:  ·  Problem: ALDAIR And electrolyte abnormalities: resolved     monitor        Resume diuretics As needed    Problem/Plan - 3:  ·  Problem: Chronic diastolic heart failure.   ·  Plan: - I&O, daily  wt  - Hold Lasix, ACE-I given profound ALDAIR   - C/w Statin.  - cardio following, Appreciated/discussed    Problem/Plan - 4:  ·  Problem: HTN (hypertension).     monitor BP closely and adjust medications as needed  cardio following    Problem/Plan - 5:  ·  Problem: HLD (hyperlipidemia).   ·  Plan: - c/w Statin.    Problem/Plan - 6:  ·  Problem: COPD with asthma.       possible mild COPD exacerbation  on admission  ·  Plan: - c/w inhaler, steroids.    patient also on prednisone as ordered ( ?  Cause of leukocytosis)    Problem/Plan - 7:  ·  Problem: Hypothyroid.   ·  Plan: - c/w Levothyroxine.    Problem/Plan - 8:  ·  Problem: Prophylactic measure.   ·  Plan: - DVT ppx: Heparin SQ   - Diet: DASH  - PT consult. / Out of bed     Patient not interested in going to rehabilitation    --------------------------------------------  Case discussed with patient..   Education given on findings and plan of care  ___________________________  HRenetta Welch D.O.  Pager: [phone]

## 2024-02-01 ENCOUNTER — TRANSCRIPTION ENCOUNTER (OUTPATIENT)
Age: 74
End: 2024-02-01

## 2024-02-01 LAB
ALBUMIN SERPL ELPH-MCNC: 3.3 G/DL — SIGNIFICANT CHANGE UP (ref 3.3–5)
ALP SERPL-CCNC: 86 U/L — SIGNIFICANT CHANGE UP (ref 40–120)
ALT FLD-CCNC: 16 U/L — SIGNIFICANT CHANGE UP (ref 10–45)
ANION GAP SERPL CALC-SCNC: 13 MMOL/L — SIGNIFICANT CHANGE UP (ref 5–17)
AST SERPL-CCNC: 20 U/L — SIGNIFICANT CHANGE UP (ref 10–40)
BASOPHILS # BLD AUTO: 0.01 K/UL — SIGNIFICANT CHANGE UP (ref 0–0.2)
BASOPHILS NFR BLD AUTO: 0.1 % — SIGNIFICANT CHANGE UP (ref 0–2)
BILIRUB SERPL-MCNC: 1 MG/DL — SIGNIFICANT CHANGE UP (ref 0.2–1.2)
BUN SERPL-MCNC: 83 MG/DL — HIGH (ref 7–23)
CALCIUM SERPL-MCNC: 9.3 MG/DL — SIGNIFICANT CHANGE UP (ref 8.4–10.5)
CHLORIDE SERPL-SCNC: 102 MMOL/L — SIGNIFICANT CHANGE UP (ref 96–108)
CO2 SERPL-SCNC: 25 MMOL/L — SIGNIFICANT CHANGE UP (ref 22–31)
CREAT SERPL-MCNC: 0.77 MG/DL — SIGNIFICANT CHANGE UP (ref 0.5–1.3)
EGFR: 80 ML/MIN/1.73M2 — SIGNIFICANT CHANGE UP
EOSINOPHIL # BLD AUTO: 0.16 K/UL — SIGNIFICANT CHANGE UP (ref 0–0.5)
EOSINOPHIL NFR BLD AUTO: 1 % — SIGNIFICANT CHANGE UP (ref 0–6)
GLUCOSE SERPL-MCNC: 129 MG/DL — HIGH (ref 70–99)
HCT VFR BLD CALC: 40.1 % — SIGNIFICANT CHANGE UP (ref 34.5–45)
HGB BLD-MCNC: 12.8 G/DL — SIGNIFICANT CHANGE UP (ref 11.5–15.5)
IMM GRANULOCYTES NFR BLD AUTO: 0.8 % — SIGNIFICANT CHANGE UP (ref 0–0.9)
LACTATE SERPL-SCNC: 1 MMOL/L — SIGNIFICANT CHANGE UP (ref 0.5–2)
LYMPHOCYTES # BLD AUTO: 1.7 K/UL — SIGNIFICANT CHANGE UP (ref 1–3.3)
LYMPHOCYTES # BLD AUTO: 10.8 % — LOW (ref 13–44)
MAGNESIUM SERPL-MCNC: 2.1 MG/DL — SIGNIFICANT CHANGE UP (ref 1.6–2.6)
MCHC RBC-ENTMCNC: 30 PG — SIGNIFICANT CHANGE UP (ref 27–34)
MCHC RBC-ENTMCNC: 31.9 GM/DL — LOW (ref 32–36)
MCV RBC AUTO: 93.9 FL — SIGNIFICANT CHANGE UP (ref 80–100)
MONOCYTES # BLD AUTO: 1.38 K/UL — HIGH (ref 0–0.9)
MONOCYTES NFR BLD AUTO: 8.8 % — SIGNIFICANT CHANGE UP (ref 2–14)
NEUTROPHILS # BLD AUTO: 12.3 K/UL — HIGH (ref 1.8–7.4)
NEUTROPHILS NFR BLD AUTO: 78.5 % — HIGH (ref 43–77)
NRBC # BLD: 0 /100 WBCS — SIGNIFICANT CHANGE UP (ref 0–0)
PHOSPHATE SERPL-MCNC: 1.9 MG/DL — LOW (ref 2.5–4.5)
PLATELET # BLD AUTO: 125 K/UL — LOW (ref 150–400)
POTASSIUM SERPL-MCNC: 4 MMOL/L — SIGNIFICANT CHANGE UP (ref 3.5–5.3)
POTASSIUM SERPL-SCNC: 4 MMOL/L — SIGNIFICANT CHANGE UP (ref 3.5–5.3)
PROT SERPL-MCNC: 6.5 G/DL — SIGNIFICANT CHANGE UP (ref 6–8.3)
RBC # BLD: 4.27 M/UL — SIGNIFICANT CHANGE UP (ref 3.8–5.2)
RBC # FLD: 15 % — HIGH (ref 10.3–14.5)
SODIUM SERPL-SCNC: 140 MMOL/L — SIGNIFICANT CHANGE UP (ref 135–145)
WBC # BLD: 15.67 K/UL — HIGH (ref 3.8–10.5)
WBC # FLD AUTO: 15.67 K/UL — HIGH (ref 3.8–10.5)

## 2024-02-01 RX ORDER — ASCORBIC ACID 60 MG
500 TABLET,CHEWABLE ORAL DAILY
Refills: 0 | Status: DISCONTINUED | OUTPATIENT
Start: 2024-02-01 | End: 2024-02-02

## 2024-02-01 RX ORDER — ALBUTEROL 90 UG/1
2 AEROSOL, METERED ORAL
Qty: 0 | Refills: 0 | DISCHARGE
Start: 2024-02-01

## 2024-02-01 RX ORDER — SODIUM,POTASSIUM PHOSPHATES 278-250MG
1 POWDER IN PACKET (EA) ORAL
Refills: 0 | Status: COMPLETED | OUTPATIENT
Start: 2024-02-01 | End: 2024-02-02

## 2024-02-01 RX ADMIN — Medication 0.5 MILLIGRAM(S): at 05:18

## 2024-02-01 RX ADMIN — TIOTROPIUM BROMIDE 2 PUFF(S): 18 CAPSULE ORAL; RESPIRATORY (INHALATION) at 12:38

## 2024-02-01 RX ADMIN — AMLODIPINE BESYLATE 5 MILLIGRAM(S): 2.5 TABLET ORAL at 05:25

## 2024-02-01 RX ADMIN — Medication 10 MILLIGRAM(S): at 17:09

## 2024-02-01 RX ADMIN — Medication 3 MILLILITER(S): at 17:08

## 2024-02-01 RX ADMIN — Medication 50 MILLIGRAM(S): at 05:12

## 2024-02-01 RX ADMIN — Medication 650 MILLIGRAM(S): at 05:17

## 2024-02-01 RX ADMIN — MONTELUKAST 10 MILLIGRAM(S): 4 TABLET, CHEWABLE ORAL at 12:38

## 2024-02-01 RX ADMIN — Medication 30 MILLIGRAM(S): at 05:16

## 2024-02-01 RX ADMIN — Medication 650 MILLIGRAM(S): at 05:47

## 2024-02-01 RX ADMIN — Medication 3 MILLILITER(S): at 12:38

## 2024-02-01 RX ADMIN — GABAPENTIN 100 MILLIGRAM(S): 400 CAPSULE ORAL at 17:09

## 2024-02-01 RX ADMIN — Medication 50 MILLIGRAM(S): at 22:43

## 2024-02-01 RX ADMIN — HEPARIN SODIUM 5000 UNIT(S): 5000 INJECTION INTRAVENOUS; SUBCUTANEOUS at 05:18

## 2024-02-01 RX ADMIN — Medication 3 MILLILITER(S): at 05:17

## 2024-02-01 RX ADMIN — Medication 1 PACKET(S): at 17:09

## 2024-02-01 RX ADMIN — DULOXETINE HYDROCHLORIDE 60 MILLIGRAM(S): 30 CAPSULE, DELAYED RELEASE ORAL at 12:38

## 2024-02-01 RX ADMIN — Medication 81 MILLIGRAM(S): at 12:37

## 2024-02-01 RX ADMIN — OLANZAPINE 10 MILLIGRAM(S): 15 TABLET, FILM COATED ORAL at 22:43

## 2024-02-01 RX ADMIN — HEPARIN SODIUM 5000 UNIT(S): 5000 INJECTION INTRAVENOUS; SUBCUTANEOUS at 17:09

## 2024-02-01 RX ADMIN — GABAPENTIN 100 MILLIGRAM(S): 400 CAPSULE ORAL at 05:11

## 2024-02-01 RX ADMIN — ATORVASTATIN CALCIUM 40 MILLIGRAM(S): 80 TABLET, FILM COATED ORAL at 22:43

## 2024-02-01 RX ADMIN — Medication 125 MICROGRAM(S): at 05:12

## 2024-02-01 RX ADMIN — Medication 50 MILLIGRAM(S): at 12:37

## 2024-02-01 RX ADMIN — Medication 10 MILLIGRAM(S): at 05:13

## 2024-02-01 RX ADMIN — Medication 0.5 MILLIGRAM(S): at 17:09

## 2024-02-01 NOTE — DIETITIAN INITIAL EVALUATION ADULT - NSFNSNUTRCHEWSWALLOWFT_GEN_A_CORE
Pt stated she does not have any chewing difficulties. Pt stated she has to eat small bites of food, as too much food is "hard to swallow." If warranted/medically appropriate, consider a formal Speech Language Pathologist evaluation to determine an appropriate diet texture/consistency. Defer diet texture to medical team.

## 2024-02-01 NOTE — DIETITIAN INITIAL EVALUATION ADULT - PERSON TAUGHT/METHOD
Provided education on high-protein diet for wound healing and preservation of lean body mass. Encouraged consumption of HBV foods, prioritizing protein foods first at meal times, and importance of  meeting adequate protein-energy needs.    Reviewed CHF diet education. Discussed Na restriction, foods high in Na to avoid, reading food labels, tips for limiting Na in your diet. Reviewed daily weights, Wt gain parameters to contact MD. Discussed Na intake in relation to fluid retention, edema and Wt gain. Pt verbalized understanding and accepted Heart Failure Nutrition Therapy Handout.  RD remains available for diet education review./verbal instruction/written material/patient instructed

## 2024-02-01 NOTE — DISCHARGE NOTE NURSING/CASE MANAGEMENT/SOCIAL WORK - PATIENT PORTAL LINK FT
You can access the FollowMyHealth Patient Portal offered by [facility] by registering at the following website: [email or web address]. By joining [employer]’s FollowMyHealth portal, you will also be able to view your health information using other applications (apps) compatible with our system.

## 2024-02-01 NOTE — DIETITIAN INITIAL EVALUATION ADULT - PERTINENT MEDS FT
MEDICATIONS  (STANDING):  albuterol    90 MICROgram(s) HFA Inhaler 2 Puff(s) Inhalation every 6 hours  albuterol/ipratropium for Nebulization 3 milliLiter(s) Nebulizer every 20 minutes  albuterol/ipratropium for Nebulization 3 milliLiter(s) Nebulizer every 6 hours  amLODIPine   Tablet 5 milliGRAM(s) Oral daily  aspirin enteric coated 81 milliGRAM(s) Oral daily  atorvastatin 40 milliGRAM(s) Oral at bedtime  buDESOnide    Inhalation Suspension 0.5 milliGRAM(s) Inhalation two times a day  busPIRone 10 milliGRAM(s) Oral two times a day  DULoxetine 60 milliGRAM(s) Oral daily  gabapentin 100 milliGRAM(s) Oral two times a day  heparin   Injectable 5000 Unit(s) SubCutaneous every 12 hours  hydrALAZINE 50 milliGRAM(s) Oral three times a day  influenza  Vaccine (HIGH DOSE) 0.7 milliLiter(s) IntraMuscular once  levothyroxine 125 MICROGram(s) Oral daily  montelukast 10 milliGRAM(s) Oral daily  OLANZapine 10 milliGRAM(s) Oral at bedtime  potassium phosphate / sodium phosphate Powder (PHOS-NaK) 1 Packet(s) Oral two times a day  propranolol 20 milliGRAM(s) Oral two times a day  sodium chloride 0.9%. 1000 milliLiter(s) (75 mL/Hr) IV Continuous <Continuous>  tiotropium 2.5 MICROgram(s) Inhaler 2 Puff(s) Inhalation daily    MEDICATIONS  (PRN):  acetaminophen     Tablet .. 650 milliGRAM(s) Oral every 6 hours PRN Temp greater or equal to 38C (100.4F), Mild Pain (1 - 3)  aluminum hydroxide/magnesium hydroxide/simethicone Suspension 30 milliLiter(s) Oral every 4 hours PRN Dyspepsia  melatonin 3 milliGRAM(s) Oral at bedtime PRN Insomnia  ondansetron Injectable 4 milliGRAM(s) IV Push every 8 hours PRN Nausea and/or Vomiting

## 2024-02-01 NOTE — DIETITIAN INITIAL EVALUATION ADULT - ENERGY INTAKE
Adequate (%) Pt stated she has "not been eating well" in-house, however stated she has been eating ~75% of meals provided? When RD asked pt why she believes she is not eating well, pt stated she is "unsure." Limited intake available to assess in flow sheets. RD offered to provide pt with oral nutrition supplements to optimize PO intake and assist with wound healing, pt declined at this time. RD offered to obtain food preferences to optimize protein-energy intake in-house, pt declined. Pt aware RD remains available.

## 2024-02-01 NOTE — DISCHARGE NOTE PROVIDER - NSDCCPCAREPLAN_GEN_ALL_CORE_FT
PRINCIPAL DISCHARGE DIAGNOSIS  Diagnosis: Diarrhea  Assessment and Plan of Treatment: Improved  Follow-up with your primary care physician within 1 week. Call for appointment.  Please bring all discharge paperwork and list of medications to all follow up appointments  Please call for follow up appointments one day after discharge        SECONDARY DISCHARGE DIAGNOSES  Diagnosis: COPD exacerbation  Assessment and Plan of Treatment: Avoid environmental allergens and asthma triggers.  Participate in physical activity as tolerated.  Seek immediate medical attention if the shortness of breath does not improve with asthma treatments, or if you experience chest pain or palpitations.  Call an ambulance if your lips or nail beds become a bluish color.       PRINCIPAL DISCHARGE DIAGNOSIS  Diagnosis: Diarrhea  Assessment and Plan of Treatment: Improved  Follow-up with your primary care physician within 1 week. Call for appointment.  Please bring all discharge paperwork and list of medications to all follow up appointments  Please call for follow up appointments one day after discharge        SECONDARY DISCHARGE DIAGNOSES  Diagnosis: COPD exacerbation  Assessment and Plan of Treatment: Avoid environmental allergens and asthma triggers.  Participate in physical activity as tolerated.  Seek immediate medical attention if the shortness of breath does not improve with asthma treatments, or if you experience chest pain or palpitations.  Call an ambulance if your lips or nail beds become a bluish color.      Diagnosis: Chronic diastolic heart failure  Assessment and Plan of Treatment: Your losartan and lasix were held due to your kidney function. Now improved. Resume these medications on discharge as per your cardiologist. An echocardiogram was stable. Please follow up with your cardiologist / Denisse.

## 2024-02-01 NOTE — DIETITIAN INITIAL EVALUATION ADULT - NSFNSGIIOFT_GEN_A_CORE
Pt endorsed occasional nausea at this time, no vomiting.   - Note: Pt ordered for Zofran and Protonix (per orders).  Pt reported no diarrhea or constipation at this time.   - Last BM: 1/30 (per nursing flow sheets)  - Bowel Regimen: Pt not currently ordered for a bowel regimen at this time.  Continue to monitor bowel movements and bowel movement regularity.

## 2024-02-01 NOTE — DISCHARGE NOTE NURSING/CASE MANAGEMENT/SOCIAL WORK - NSDCVIVACCINE_GEN_ALL_CORE_FT
Tdap; 19-Nov-2019 16:31; Christine Skaggs (RN); [employer]; [id number] (Exp. Date: 17-Sep-2021); IntraMuscular; Deltoid Left.; 0.5 milliLiter(s); VIS (VIS Published: 09-May-2013, VIS Presented: 19-Nov-2019);

## 2024-02-01 NOTE — DIETITIAN INITIAL EVALUATION ADULT - REASON INDICATOR FOR ASSESSMENT
RD consult warranted for Pressure Injury Stage 2 or >  Source: Patient, Electronic Medical Record  Chart reviewed, events noted.

## 2024-02-01 NOTE — DIETITIAN INITIAL EVALUATION ADULT - NSFNSPHYEXAMSKINFT_GEN_A_CORE
Per Wound Care Note on 1/30:  "Sacral/bilateral Buttocks, posterior upper thigh deep tissue injury present on admission  Incontinence of bowel and bladder  Incontinence Dermatitis"

## 2024-02-01 NOTE — PROGRESS NOTE ADULT - ASSESSMENT
_________________________________________________________________________________________  ========>>  M E D I C A L   A T T E N D I N G    F O L L O W  U P  N O T E  <<=========  -----------------------------------------------------------------------------------------------------    - Patient seen and examined by me earlier today.   - Patient today overall doing better, comfortable, eating OK.  diarrhea And SOB resolved per patient ..     Patient denies any fever, chills, G.I., urinary, or other symptoms of infection, + Occasional cough, dry       patient and  at bedside inquiring about going home     ==================>> REVIEW OF SYSTEM <<=================    GEN: no fever, no chills, no pain  RESP: no SOB, no cough, no sputum  CVS: no chest pain, no palpitations  GI: no abdominal pain, no nausea, as above   : no dysuria, no frequency  Neuro: no headache, no dizziness    ==================>> PHYSICAL EXAM <<=================    GEN: A&O X 3 , NAD , comfortable, pleasant, calm  HEENT: NCAT, PERRL, MMM, hearing intact  CVS: S1S2 , regular , No M/R/G appreciated  PULM: CTA B/L,  limited exam as not taking deep breaths, limited exam due to body habitus.   ABD.: soft. non tender, non distended,  obese   Extrem: intact pulses , chronic lymphedema and chronic changes        ( Note written / Date of service 02-01-24 ( This is certified to be the same as "ENTERED" date above ( for billing purposes)))    ==================>> MEDICATIONS <<====================    albuterol    90 MICROgram(s) HFA Inhaler 2 Puff(s) Inhalation every 6 hours  albuterol/ipratropium for Nebulization 3 milliLiter(s) Nebulizer every 20 minutes  albuterol/ipratropium for Nebulization 3 milliLiter(s) Nebulizer every 6 hours  amLODIPine   Tablet 5 milliGRAM(s) Oral daily  aspirin enteric coated 81 milliGRAM(s) Oral daily  atorvastatin 40 milliGRAM(s) Oral at bedtime  buDESOnide    Inhalation Suspension 0.5 milliGRAM(s) Inhalation two times a day  busPIRone 10 milliGRAM(s) Oral two times a day  DULoxetine 60 milliGRAM(s) Oral daily  gabapentin 100 milliGRAM(s) Oral two times a day  heparin   Injectable 5000 Unit(s) SubCutaneous every 12 hours  hydrALAZINE 50 milliGRAM(s) Oral three times a day  influenza  Vaccine (HIGH DOSE) 0.7 milliLiter(s) IntraMuscular once  levothyroxine 125 MICROGram(s) Oral daily  montelukast 10 milliGRAM(s) Oral daily  OLANZapine 10 milliGRAM(s) Oral at bedtime  propranolol 20 milliGRAM(s) Oral two times a day  sodium chloride 0.9%. 1000 milliLiter(s) IV Continuous <Continuous>  tiotropium 2.5 MICROgram(s) Inhaler 2 Puff(s) Inhalation daily    MEDICATIONS  (PRN):  acetaminophen     Tablet .. 650 milliGRAM(s) Oral every 6 hours PRN Temp greater or equal to 38C (100.4F), Mild Pain (1 - 3)  aluminum hydroxide/magnesium hydroxide/simethicone Suspension 30 milliLiter(s) Oral every 4 hours PRN Dyspepsia  melatonin 3 milliGRAM(s) Oral at bedtime PRN Insomnia  ondansetron Injectable 4 milliGRAM(s) IV Push every 8 hours PRN Nausea and/or Vomiting    ___________  Active diet:  Diet, Regular:   DASH/TLC Sodium & Cholesterol Restricted (DASH)  ___________________    ==================>> VITAL SIGNS <<==================    Height (cm): 157.5  Weight (kg): 91.6  BMI (kg/m2): 36.9  Vital Signs Last 24 HrsT(C): 36.8 (02-01-24 @ 11:19)  T(F): 98.3 (02-01-24 @ 11:19), Max: 98.3 (02-01-24 @ 11:19)  HR: 65 (02-01-24 @ 11:19) (65 - 115)  BP: 126/59 (02-01-24 @ 11:19)  RR: 18 (02-01-24 @ 11:19) (18 - 18)  SpO2: 99% (02-01-24 @ 11:19) (94% - 99%)       ==================>> LAB AND IMAGING <<==================                        12.8   15.67 )-----------( 125      ( 01 Feb 2024 06:54 )             40.1        02-01    140  |  102  |  83<H>  ----------------------------<  129<H>  4.0   |  25  |  0.77    Ca    9.3      01 Feb 2024 06:54  Phos  1.9     02-01  Mg     2.1     02-01    TPro  6.5  /  Alb  3.3  /  TBili  1.0  /  DBili  x   /  AST  20  /  ALT  16  /  AlkPhos  86  02-01    WBC count:   15.67 <<== ,  19.40 <<== ,  19.08 <<== ,  19.54 <<==   Hemoglobin:   12.8 <<==,  12.9 <<==,  13.2 <<==,  13.6 <<==  platelets:  125 <==, 135 <==, 132 <==, 152 <==    Creatinine:  0.77  <<==, 1.24  <<==, 2.28  <<==, 2.65  <<==  Sodium:   140  <==, 140  <==, 134  <==, 130  <==       AST:          20(02-01) <== , 24(01-29) <==      ALT:        16(02-01)  <== , 17(01-29)  <==      AP:        86(02-01)  <=, 94(01-29)  <=     Bili:        1.0(02-01)  <=, 0.9(01-29)  <=    ___________________________________________________________________________________  ===============>>  A S S E S S M E N T   A N D   P L A N <<===============  ------------------------------------------------------------------------------------------    · Assessment	  73y F PMH COPD on 3 L NC at home, hypothyroid, diastolic CHF on Lasix, breast cancer s/p surgery, asthma, chronic lymphedema, kidney cancer  s/p partial nephrectomy, wheelchair-bound, obesity presenting for diarrhea found to have ALDAIR, admit fo further management     Problem/Plan - 1:  ·  Problem: Diarrhea. >> Now resolved   - Recent hospitalization w/abx use , Check C.diff If diarrhea recurs  - Stool studies, GI - PCR, C.diff cx If more diarrhea  - trend leukocytosis >> improved       no other Infectious symptoms reported, patient tolerating diet , No fever  - Supportive care.    Problem/Plan - 2:  ·  Problem: ALDAIR And electrolyte abnormalities: resolved     monitor        Resume diuretics As needed / on Dc     Problem/Plan - 3:  ·  Problem: Chronic diastolic heart failure.   ·  Plan: - I&O, daily  wt  - Hold Lasix, ACE-I given profound ALDAIR   - C/w Statin.  - cardio following, Appreciated/discussed    Problem/Plan - 4:  ·  Problem: HTN (hypertension).     monitor BP closely and adjust medications as needed  cardio following    Problem/Plan - 5:  ·  Problem: HLD (hyperlipidemia).   ·  Plan: - c/w Statin.    Problem/Plan - 6:  ·  Problem: COPD with asthma.       possible mild COPD exacerbation  on admission  ·  Plan: - c/w inhaler, steroids.    patient also on prednisone as ordered ( ?  Cause of leukocytosis)    Problem/Plan - 7:  ·  Problem: Hypothyroid.   ·  Plan: - c/w Levothyroxine.    Problem/Plan - 8:  ·  Problem: Prophylactic measure.   ·  Plan: - DVT ppx: Heparin SQ   - Diet: DASH  - PT consult. / Out of bed     Patient not interested in going to rehabilitation    DC planing home with home care . PT   --------------------------------------------  Case discussed with patient.. , Nurse Practitioner,   /    Education given on findings and plan of care  ___________________________  HRenetta Welch D.O.  Pager: [phone]

## 2024-02-01 NOTE — DISCHARGE NOTE PROVIDER - HOSPITAL COURSE
HPI:  73y F PMH COPD on 3 L NC at home, hypothyroid, diastolic CHF on Lasix, breast cancer s/p surgery, asthma, chronic lymphedema, kidney cancer  s/p partial nephrectomy, wheelchair-bound, obesity presenting for cough and diarrhea.  Patient states that she had diarrhea about 3 episodes per day for the past 2 days and has had continued dry cough for the past month.  Patient was recently admitted from January 13 to January 20 for SOB admitted with human metapneumovirus and acute COPD exacerbation.  Patient states that a day after she went home she started feeling sick again.  Denies fever, chest pain, shortness of breath, abdominal pain, nausea, vomiting, dysuria, constipation.  Endorses poor appetite  decreased p.o. intake.  Feels weak is now unable to transfer from her wheelchair to the bed as easily as before.     ROS: Denies CP, SOB, palpitation, N/V, fever, chills, dizziness, abm pain, recent travel, change in urinary habits     A 10-system ROS was performed and is negative except as noted above and/or in the HPI.   (29 Jan 2024 22:28)    Hospital Course: Patient was admitted for further medical management.  Diarrhea now resolved. Labwork was notable for ALDAIR and leukocytosis >> improved. No other Infectious symptoms reported, patient tolerating diet, No fever. Discharge/Dispo/Med rec discussed with attending  ____. Patient medically cleared for discharge ____ with outpatient follow up      Important Medication Changes and Reason:    Active or Pending Issues Requiring Follow-up:    Advanced Directives:   [ ] Full code  [ ] DNR  [ ] Hospice    Discharge Diagnoses:  Diarrhea  ALDAIR   Chronic diastolic heart failure  HTN   HLD   COPD with asthma  Hypothyroid.      HPI:  73y F PMH COPD on 3 L NC at home, hypothyroid, diastolic CHF on Lasix, breast cancer s/p surgery, asthma, chronic lymphedema, kidney cancer  s/p partial nephrectomy, wheelchair-bound, obesity presenting for cough and diarrhea.  Patient states that she had diarrhea about 3 episodes per day for the past 2 days and has had continued dry cough for the past month.  Patient was recently admitted from January 13 to January 20 for SOB admitted with human metapneumovirus and acute COPD exacerbation.  Patient states that a day after she went home she started feeling sick again.  Denies fever, chest pain, shortness of breath, abdominal pain, nausea, vomiting, dysuria, constipation.  Endorses poor appetite  decreased p.o. intake.  Feels weak is now unable to transfer from her wheelchair to the bed as easily as before.     ROS: Denies CP, SOB, palpitation, N/V, fever, chills, dizziness, abm pain, recent travel, change in urinary habits     A 10-system ROS was performed and is negative except as noted above and/or in the HPI.   (29 Jan 2024 22:28)    Hospital Course: Patient was admitted for further medical management.  Diarrhea now resolved. Labwork was notable for ALDAIR and leukocytosis >> improved. No other Infectious symptoms reported, patient tolerating diet, No fever. Discharge/Dispo/Med rec discussed with attending Dr. Welch Patient medically cleared for discharge.      Important Medication Changes and Reason:    Active or Pending Issues Requiring Follow-up:    Advanced Directives:   [ ] Full code  [ ] DNR  [ ] Hospice    Discharge Diagnoses:  Diarrhea  ALDAIR   Chronic diastolic heart failure  HTN   HLD   COPD with asthma  Hypothyroid.      HPI:  73y F PMH COPD on 3 L NC at home, hypothyroid, diastolic CHF on Lasix, breast cancer s/p surgery, asthma, chronic lymphedema, kidney cancer  s/p partial nephrectomy, wheelchair-bound, obesity presenting for cough and diarrhea.  Patient states that she had diarrhea about 3 episodes per day for the past 2 days and has had continued dry cough for the past month.  Patient was recently admitted from January 13 to January 20 for SOB admitted with human metapneumovirus and acute COPD exacerbation.  Patient states that a day after she went home she started feeling sick again.  Denies fever, chest pain, shortness of breath, abdominal pain, nausea, vomiting, dysuria, constipation.  Endorses poor appetite  decreased p.o. intake.  Feels weak is now unable to transfer from her wheelchair to the bed as easily as before.     ROS: Denies CP, SOB, palpitation, N/V, fever, chills, dizziness, abm pain, recent travel, change in urinary habits     A 10-system ROS was performed and is negative except as noted above and/or in the HPI.   (29 Jan 2024 22:28)    Hospital Course: Patient was admitted for further medical management.  Diarrhea now resolved. Labwork was notable for ALDAIR and leukocytosis >> improved. No other Infectious symptoms reported, patient tolerating diet, No fever. Lasix and losartan were resumed on discharge as per cardiology recs and ALDAIR resolved. Discharge/Dispo/Med rec discussed with attending Dr. Welch Patient medically cleared for discharge.      Important Medication Changes and Reason:      Active or Pending Issues Requiring Follow-up:    Advanced Directives:   [ ] Full code  [ ] DNR  [ ] Hospice    Discharge Diagnoses:  Diarrhea  ALDAIR   Chronic diastolic heart failure  HTN   HLD   COPD with asthma  Hypothyroid.

## 2024-02-01 NOTE — DIETITIAN INITIAL EVALUATION ADULT - ADD RECOMMEND
1) Recommend continue current diet as tolerated: DASH/TLC. Defer diet texture/consistency to Speech Language Pathologist/Medical Team.   2) Recommend adding Multivitamin and Vitamin C daily for wound healing unless contraindicated.   3) Monitor and encourage adequate PO intake, obtain food preferences and honor as able. Pt declined oral nutrition supplements at this time.   4) Monitor electrolytes, replete as needed. Monitor phosphorous.  5) Monitor nutritional intake, tolerance to diet prescription, weights, labs, and skin integrity.  1) Recommend continue current diet as tolerated: DASH/TLC. Defer diet texture/consistency to Speech Language Pathologist/Medical Team.   2) Recommend adding Multivitamin and Vitamin C daily for wound healing unless contraindicated.   3) Monitor and encourage adequate PO intake, obtain food preferences and honor as able. Pt declined oral nutrition supplements at this time.   4) Monitor electrolytes, replete as needed. Monitor phosphorous.  5) Monitor nutritional intake, tolerance to diet prescription, weights, labs, and skin integrity.   6) BMI sticker placed in chart.

## 2024-02-01 NOTE — DISCHARGE NOTE PROVIDER - NSDCFUADDAPPT_GEN_ALL_CORE_FT
APPTS ARE READY TO BE MADE: [x] YES    Best Family or Patient Contact (if needed):    Additional Information about above appointments (if needed):    1:   2:   3:     Other comments or requests:    APPTS ARE READY TO BE MADE: [x] YES    Best Family or Patient Contact (if needed):    Additional Information about above appointments (if needed):    1:   2:   3:     Other comments or requests:   Patient was provided with follow up request details and was advised to call to schedule follow up within specified time frame. No scheduling assistance is needed at this time.   APPTS ARE READY TO BE MADE: [x] YES    Best Family or Patient Contact (if needed):    Additional Information about above appointments (if needed):    1: PCP/Cardiology  2:   3:     Other comments or requests:   Patient was provided with follow up request details and was advised to call to schedule follow up within specified time frame. No scheduling assistance is needed at this time.

## 2024-02-01 NOTE — DIETITIAN INITIAL EVALUATION ADULT - OTHER INFO
Weights:  - UBW (per patient): Pt stated she is unsure of her UBW at this time.   - Dosing Weight (per chart): 201.9 pounds (1/29)  - Daily Weights (per flow sheets): 231.4 pounds (2/1)  - Bed Scale Weight (taken by RD): 261.3 pounds (bed) (2/1)  - Per [facility] HIE: HIE data unavailable at this time.  - Per Previous RD Notes: 200 pounds (7/28/23), 256.2 pounds (7/5/23) (dosing), 291 pounds (3/21/23)(dosing), 233.9 pounds (1/13/21)  Weight fluctuations noted, possibly in setting of bed scale weight discrepancies vs fluid shifts as pt with CHF and 2+ edema. RD to continue to monitor weights and trends as able. Pt endorsed gradual weight gain over the past "few years" in setting of fluid shifts.     Pt with diarrhea ---> now resolved (per chart).  Pt with ALDAIR and electrolyte abnormalities (per chart).  - Phosphorous low today, 1.9 mg/dL (2/1). Pt is ordered for potassium phosphate/sodium phosphate powder (per orders) (2/1).     Pt with chronic diastolic heart failure (per chart).     Pt with hypothyroid (per chart).  - Ordered for synthroid in-house (per orders).     Orders:  - Ordered for IV Sodium Chloride 0.9% (per orders).  Weights:  - UBW (per patient): Pt stated she is unsure of her UBW at this time.   - Dosing Weight (per chart): 201.9 pounds (1/29)  - Daily Weights (per flow sheets): 231.4 pounds (2/1) (used for BMI)  - Bed Scale Weight (taken by RD): 261.3 pounds (bed) (2/1)  - Per [facility] HIE: HIE data unavailable at this time.  - Per Previous RD Notes: 200 pounds (7/28/23), 256.2 pounds (7/5/23) (dosing), 291 pounds (3/21/23)(dosing), 233.9 pounds (1/13/21)  Weight fluctuations noted, possibly in setting of bed scale weight discrepancies vs fluid shifts as pt with CHF and 2+ edema. RD to continue to monitor weights and trends as able. Pt endorsed gradual weight gain over the past "few years" in setting of fluid shifts.     Pt with diarrhea ---> now resolved (per chart).  Pt with ALDAIR and electrolyte abnormalities (per chart).  - Phosphorous low today, 1.9 mg/dL (2/1). Pt is ordered for potassium phosphate/sodium phosphate powder (per orders) (2/1).     Pt with chronic diastolic heart failure (per chart).     Pt with hypothyroid (per chart).  - Ordered for synthroid in-house (per orders).     Orders:  - Ordered for IV Sodium Chloride 0.9% (per orders).  Weights:  - UBW (per patient): Pt stated she is unsure of her UBW at this time, however endorses weight gain over the past few years.   - Dosing Weight (per chart): 201.9 pounds (1/29) (question accuracy)  - Daily Weights (per flow sheets): 231.4 pounds (2/1) (used for BMI)  - Bed Scale Weight (taken by RD): 261.3 pounds (bed) (2/1)  - Per [facility] HIE: HIE data unavailable at this time.  - Per Previous RD Notes: 200 pounds (question accuracy)(7/28/23), 256.2 pounds (7/5/23) (dosing), 291 pounds (3/21/23)(dosing), 233.9 pounds (1/13/21)  Weight fluctuations noted, possibly in setting of bed scale weight discrepancies vs fluid shifts as pt with CHF and 2+ edema. RD to continue to monitor weights and trends as able. Pt endorsed gradual weight gain over the past "few years" in setting of fluid shifts.     Pt with diarrhea ---> now resolved (per chart).  Pt with ALDAIR and electrolyte abnormalities (per chart).  - Phosphorous low today, 1.9 mg/dL (2/1). Pt is ordered for potassium phosphate/sodium phosphate powder (per orders) (2/1).     Pt with chronic diastolic heart failure (per chart).     Pt with hypothyroid (per chart).  - Ordered for synthroid in-house (per orders).     Orders:  - Ordered for IV Sodium Chloride 0.9% (per orders).

## 2024-02-01 NOTE — DIETITIAN INITIAL EVALUATION ADULT - NUTRITIONGOAL OUTCOME1
Pt will be able to meet >75% of estimated nutrient needs.  Pt will be able to meet >75% estimated protein-energy needs while having gradual weight loss towards IBW.

## 2024-02-01 NOTE — DIETITIAN INITIAL EVALUATION ADULT - ORAL INTAKE PTA/DIET HISTORY
Nutrition assessment completed at bedside. Pt noted to be AAOx3, however sleepy at time of RD interview, noted to be confused at times (per nursing flow sheets). Question accuracy of subjective information provided. Pt reported good appetite and PO intake at baseline, endorsed no recent significant changes? Per H&P, pt "endorses poor appetite decreased p.o. intake"? Pt endorsed gradual weight gain over the past couple of years secondary to "fluid shifts." Pt stated she is unsure of her UBW, unsure of current weights at this time. Pt confirmed food allergy to shellfish, grapes, and peaches.

## 2024-02-01 NOTE — DIETITIAN INITIAL EVALUATION ADULT - ETIOLOGY
related to increased physiological demand for nutrients related to increased physiological demand for nutrients  related to suspected energy intake exceeding energy expenditure

## 2024-02-01 NOTE — DISCHARGE NOTE PROVIDER - NSDCCAREPROVSEEN_GEN_ALL_CORE_FT
Kamila Welch Kamila Delshadky Gamboaorbbenjamín Delshadky Gamboaorbbenjamín Delshadfar  Josey Salcedo      [ Greater than 35 min spent for discharge services.   DIANE Welch ]       ( Note written / Date of service is the same as last day of patient stay  in the hospital ( for billing purposes)))

## 2024-02-01 NOTE — DIETITIAN INITIAL EVALUATION ADULT - OTHER CALCULATIONS
Estimated protein-energy needs calculated using IBW of 110 pounds with consideration for BMI >35, suspected deep tissue pressure injury, and CHF.   Defer fluid calculations to the medical team.

## 2024-02-01 NOTE — DIETITIAN INITIAL EVALUATION ADULT - NSFNSADHERENCEPTAFT_GEN_A_CORE
Prior to admission, pt stated she followed a regular diet at home, monitored sodium intake in setting of chronic diastolic heart failure. Pt stated she does not take daily weights at home.     Pt endorsed good appetite and PO intake at baseline, reported no recent significant changes.

## 2024-02-01 NOTE — DISCHARGE NOTE PROVIDER - CARE PROVIDER_API CALL
Juan Salcedo  Cardiovascular Disease  [address] [place] [id number]  Phone: [phone]  Fax: [phone]  Follow Up Time: 1 week

## 2024-02-01 NOTE — DISCHARGE NOTE PROVIDER - NSDCMRMEDTOKEN_GEN_ALL_CORE_FT
amLODIPine 5 mg oral tablet: 1 tab(s) orally once a day  aspirin 81 mg oral delayed release tablet: 1 tab(s) orally once a day  atorvastatin 40 mg oral tablet: 1 tab(s) orally once a day (at bedtime)  budesonide 0.5 mg/2 mL inhalation suspension: 2 milliliter(s) by nebulizer 2 times a day  busPIRone 10 mg oral tablet: 2 tab(s) orally once a day at noon  DULoxetine 60 mg oral delayed release capsule: 1 cap(s) orally once a day  furosemide 40 mg oral tablet: 1 tab(s) orally once a day  gabapentin 100 mg oral capsule: 1 cap(s) orally 2 times a day  hydrALAZINE 50 mg oral tablet: 1 tab(s) orally 3 times a day  ipratropium-albuterol 0.5 mg-2.5 mg/3 mL inhalation solution: 3 milliliter(s) inhaled every 6 hours as needed for  shortness of breath and/or wheezing  ipratropium-albuterol 0.5 mg-2.5 mg/3 mL inhalation solution: 3 milliliter(s) by nebulizer every 6 hours as needed for  shortness of breath and/or wheezing  levothyroxine 125 mcg (0.125 mg) oral tablet: 1 tab(s) orally once a day  losartan 100 mg oral tablet: 1 tab(s) orally once a day  melatonin 3 mg oral tablet: 1 tab(s) orally once a day (at bedtime)  montelukast 10 mg oral tablet: 1 tab(s) orally once a day  OLANZapine 10 mg oral tablet: 1 tab(s) orally once a day (at bedtime)  penicillin V potassium 500 mg oral tablet: 1 tab(s) orally 2 times a day for 6 months- Follow up with Infectious disease Doctor when to restart this medication  predniSONE 10 mg oral tablet: 3 tab(s) orally once a day 30 mg (3 tablets) once a day for 3 days. then 20 mg (2 tablets) once a day for 3 days, and then 10 mg (1 tablet) once a day for 3 days.  propranolol 20 mg oral tablet: 1 tab(s) orally 2 times a day  Spiriva HandiHaler 18 mcg inhalation capsule: 1 cap(s) inhaled once a day   albuterol 90 mcg/inh inhalation aerosol: 2 puff(s) inhaled every 6 hours  amLODIPine 5 mg oral tablet: 1 tab(s) orally once a day  aspirin 81 mg oral delayed release tablet: 1 tab(s) orally once a day  atorvastatin 40 mg oral tablet: 1 tab(s) orally once a day (at bedtime)  budesonide 0.5 mg/2 mL inhalation suspension: 2 milliliter(s) by nebulizer 2 times a day  busPIRone 10 mg oral tablet: 1 tab(s) orally 2 times a day  DULoxetine 60 mg oral delayed release capsule: 1 cap(s) orally once a day  furosemide 40 mg oral tablet: 1 tab(s) orally once a day  gabapentin 100 mg oral capsule: 1 cap(s) orally 2 times a day  hydrALAZINE 50 mg oral tablet: 1 tab(s) orally 3 times a day  ipratropium-albuterol 0.5 mg-2.5 mg/3 mL inhalation solution: 3 milliliter(s) inhaled every 6 hours as needed for  shortness of breath and/or wheezing  levothyroxine 125 mcg (0.125 mg) oral tablet: 1 tab(s) orally once a day  losartan 100 mg oral tablet: 1 tab(s) orally once a day  melatonin 3 mg oral tablet: 1 tab(s) orally once a day (at bedtime)  montelukast 10 mg oral tablet: 1 tab(s) orally once a day  OLANZapine 10 mg oral tablet: 1 tab(s) orally once a day (at bedtime)  propranolol 20 mg oral tablet: 1 tab(s) orally 2 times a day  Spiriva HandiHaler 18 mcg inhalation capsule: 1 cap(s) inhaled once a day   albuterol 90 mcg/inh inhalation aerosol: 2 puff(s) inhaled every 6 hours  amLODIPine 5 mg oral tablet: 1 tab(s) orally once a day  ascorbic acid 500 mg oral tablet: 1 tab(s) orally once a day  aspirin 81 mg oral delayed release tablet: 1 tab(s) orally once a day  atorvastatin 40 mg oral tablet: 1 tab(s) orally once a day (at bedtime)  budesonide 0.5 mg/2 mL inhalation suspension: 2 milliliter(s) by nebulizer 2 times a day  busPIRone 10 mg oral tablet: 1 tab(s) orally 2 times a day  DULoxetine 60 mg oral delayed release capsule: 1 cap(s) orally once a day  furosemide 40 mg oral tablet: 1 tab(s) orally once a day  gabapentin 100 mg oral capsule: 1 cap(s) orally 2 times a day  hydrALAZINE 50 mg oral tablet: 1 tab(s) orally 3 times a day  ipratropium-albuterol 0.5 mg-2.5 mg/3 mL inhalation solution: 3 milliliter(s) inhaled every 6 hours as needed for  shortness of breath and/or wheezing  levothyroxine 125 mcg (0.125 mg) oral tablet: 1 tab(s) orally once a day  losartan 100 mg oral tablet: 1 tab(s) orally once a day  melatonin 3 mg oral tablet: 1 tab(s) orally once a day (at bedtime)  montelukast 10 mg oral tablet: 1 tab(s) orally once a day  Multiple Vitamins oral tablet: 1 tab(s) orally once a day  OLANZapine 10 mg oral tablet: 1 tab(s) orally once a day (at bedtime)  propranolol 20 mg oral tablet: 1 tab(s) orally 2 times a day  Spiriva HandiHaler 18 mcg inhalation capsule: 1 cap(s) inhaled once a day

## 2024-02-01 NOTE — DIETITIAN INITIAL EVALUATION ADULT - REASON
Nutrition focused physical exam deferred at this time as pt stated she was eating well prior to admission, and has not experienced any recent significant weight changes.

## 2024-02-01 NOTE — DIETITIAN INITIAL EVALUATION ADULT - PROBLEM SELECTOR PLAN 1
- P/w 2 days of diarrhea, poor intake, weakness  - Recent hospitalization w/abx use , C/f C.diff  - Stool studies, GI - PCR, C.diff cx  - Contact precaution   - Supportive care

## 2024-02-01 NOTE — DIETITIAN INITIAL EVALUATION ADULT - PERTINENT LABORATORY DATA
02-01    140  |  102  |  83<H>  ----------------------------<  129<H>  4.0   |  25  |  0.77    Ca    9.3      01 Feb 2024 06:54  Phos  1.9     02-01  Mg     2.1     02-01    TPro  6.5  /  Alb  3.3  /  TBili  1.0  /  DBili  x   /  AST  20  /  ALT  16  /  AlkPhos  86  02-01

## 2024-02-01 NOTE — DIETITIAN INITIAL EVALUATION ADULT - NS FNS DIET ORDER
Diet, Regular:   DASH/TLC {Sodium & Cholesterol Restricted} (DASH) (01-29-24 @ 22:50) [Active]

## 2024-02-02 VITALS
HEART RATE: 75 BPM | DIASTOLIC BLOOD PRESSURE: 74 MMHG | RESPIRATION RATE: 18 BRPM | OXYGEN SATURATION: 98 % | TEMPERATURE: 98 F | SYSTOLIC BLOOD PRESSURE: 146 MMHG

## 2024-02-02 LAB
ANION GAP SERPL CALC-SCNC: 12 MMOL/L — SIGNIFICANT CHANGE UP (ref 5–17)
BUN SERPL-MCNC: 47 MG/DL — HIGH (ref 7–23)
CALCIUM SERPL-MCNC: 9.4 MG/DL — SIGNIFICANT CHANGE UP (ref 8.4–10.5)
CHLORIDE SERPL-SCNC: 105 MMOL/L — SIGNIFICANT CHANGE UP (ref 96–108)
CO2 SERPL-SCNC: 25 MMOL/L — SIGNIFICANT CHANGE UP (ref 22–31)
CREAT SERPL-MCNC: 0.66 MG/DL — SIGNIFICANT CHANGE UP (ref 0.5–1.3)
EGFR: 91 ML/MIN/1.73M2 — SIGNIFICANT CHANGE UP
GLUCOSE SERPL-MCNC: 96 MG/DL — SIGNIFICANT CHANGE UP (ref 70–99)
HCT VFR BLD CALC: 39.4 % — SIGNIFICANT CHANGE UP (ref 34.5–45)
HGB BLD-MCNC: 12.4 G/DL — SIGNIFICANT CHANGE UP (ref 11.5–15.5)
MCHC RBC-ENTMCNC: 30.4 PG — SIGNIFICANT CHANGE UP (ref 27–34)
MCHC RBC-ENTMCNC: 31.5 GM/DL — LOW (ref 32–36)
MCV RBC AUTO: 96.6 FL — SIGNIFICANT CHANGE UP (ref 80–100)
NRBC # BLD: 0 /100 WBCS — SIGNIFICANT CHANGE UP (ref 0–0)
PHOSPHATE SERPL-MCNC: 1.5 MG/DL — LOW (ref 2.5–4.5)
PLATELET # BLD AUTO: 129 K/UL — LOW (ref 150–400)
POTASSIUM SERPL-MCNC: 4.6 MMOL/L — SIGNIFICANT CHANGE UP (ref 3.5–5.3)
POTASSIUM SERPL-SCNC: 4.6 MMOL/L — SIGNIFICANT CHANGE UP (ref 3.5–5.3)
RBC # BLD: 4.08 M/UL — SIGNIFICANT CHANGE UP (ref 3.8–5.2)
RBC # FLD: 14.9 % — HIGH (ref 10.3–14.5)
SODIUM SERPL-SCNC: 142 MMOL/L — SIGNIFICANT CHANGE UP (ref 135–145)
WBC # BLD: 15.28 K/UL — HIGH (ref 3.8–10.5)
WBC # FLD AUTO: 15.28 K/UL — HIGH (ref 3.8–10.5)

## 2024-02-02 PROCEDURE — 94640 AIRWAY INHALATION TREATMENT: CPT

## 2024-02-02 PROCEDURE — 84484 ASSAY OF TROPONIN QUANT: CPT

## 2024-02-02 PROCEDURE — 82330 ASSAY OF CALCIUM: CPT

## 2024-02-02 PROCEDURE — 85027 COMPLETE CBC AUTOMATED: CPT

## 2024-02-02 PROCEDURE — 93005 ELECTROCARDIOGRAM TRACING: CPT

## 2024-02-02 PROCEDURE — 83880 ASSAY OF NATRIURETIC PEPTIDE: CPT

## 2024-02-02 PROCEDURE — 36415 COLL VENOUS BLD VENIPUNCTURE: CPT

## 2024-02-02 PROCEDURE — 82550 ASSAY OF CK (CPK): CPT

## 2024-02-02 PROCEDURE — 80053 COMPREHEN METABOLIC PANEL: CPT

## 2024-02-02 PROCEDURE — 83605 ASSAY OF LACTIC ACID: CPT

## 2024-02-02 PROCEDURE — 99285 EMERGENCY DEPT VISIT HI MDM: CPT | Mod: 25

## 2024-02-02 PROCEDURE — 97110 THERAPEUTIC EXERCISES: CPT

## 2024-02-02 PROCEDURE — 97161 PT EVAL LOW COMPLEX 20 MIN: CPT

## 2024-02-02 PROCEDURE — 85018 HEMOGLOBIN: CPT

## 2024-02-02 PROCEDURE — 82947 ASSAY GLUCOSE BLOOD QUANT: CPT

## 2024-02-02 PROCEDURE — 84295 ASSAY OF SERUM SODIUM: CPT

## 2024-02-02 PROCEDURE — 83735 ASSAY OF MAGNESIUM: CPT

## 2024-02-02 PROCEDURE — 85730 THROMBOPLASTIN TIME PARTIAL: CPT

## 2024-02-02 PROCEDURE — 82553 CREATINE MB FRACTION: CPT

## 2024-02-02 PROCEDURE — 71045 X-RAY EXAM CHEST 1 VIEW: CPT

## 2024-02-02 PROCEDURE — 85610 PROTHROMBIN TIME: CPT

## 2024-02-02 PROCEDURE — 85014 HEMATOCRIT: CPT

## 2024-02-02 PROCEDURE — 80048 BASIC METABOLIC PNL TOTAL CA: CPT

## 2024-02-02 PROCEDURE — 82803 BLOOD GASES ANY COMBINATION: CPT

## 2024-02-02 PROCEDURE — 82435 ASSAY OF BLOOD CHLORIDE: CPT

## 2024-02-02 PROCEDURE — 84100 ASSAY OF PHOSPHORUS: CPT

## 2024-02-02 PROCEDURE — 85025 COMPLETE CBC W/AUTO DIFF WBC: CPT

## 2024-02-02 PROCEDURE — 87637 SARSCOV2&INF A&B&RSV AMP PRB: CPT

## 2024-02-02 PROCEDURE — 84132 ASSAY OF SERUM POTASSIUM: CPT

## 2024-02-02 PROCEDURE — 97530 THERAPEUTIC ACTIVITIES: CPT

## 2024-02-02 RX ORDER — SODIUM,POTASSIUM PHOSPHATES 278-250MG
1 POWDER IN PACKET (EA) ORAL ONCE
Refills: 0 | Status: COMPLETED | OUTPATIENT
Start: 2024-02-02 | End: 2024-02-02

## 2024-02-02 RX ORDER — ASCORBIC ACID 60 MG
1 TABLET,CHEWABLE ORAL
Qty: 30 | Refills: 0
Start: 2024-02-02 | End: 2024-03-02

## 2024-02-02 RX ADMIN — Medication 81 MILLIGRAM(S): at 12:02

## 2024-02-02 RX ADMIN — Medication 500 MILLIGRAM(S): at 12:02

## 2024-02-02 RX ADMIN — Medication 50 MILLIGRAM(S): at 07:55

## 2024-02-02 RX ADMIN — HEPARIN SODIUM 5000 UNIT(S): 5000 INJECTION INTRAVENOUS; SUBCUTANEOUS at 05:39

## 2024-02-02 RX ADMIN — Medication 1 TABLET(S): at 12:02

## 2024-02-02 RX ADMIN — Medication 3 MILLILITER(S): at 00:45

## 2024-02-02 RX ADMIN — Medication 10 MILLIGRAM(S): at 07:55

## 2024-02-02 RX ADMIN — Medication 0.5 MILLIGRAM(S): at 05:51

## 2024-02-02 RX ADMIN — Medication 3 MILLILITER(S): at 12:01

## 2024-02-02 RX ADMIN — AMLODIPINE BESYLATE 5 MILLIGRAM(S): 2.5 TABLET ORAL at 07:55

## 2024-02-02 RX ADMIN — TIOTROPIUM BROMIDE 2 PUFF(S): 18 CAPSULE ORAL; RESPIRATORY (INHALATION) at 12:03

## 2024-02-02 RX ADMIN — Medication 3 MILLILITER(S): at 05:38

## 2024-02-02 RX ADMIN — Medication 1 PACKET(S): at 12:02

## 2024-02-02 RX ADMIN — Medication 125 MICROGRAM(S): at 07:55

## 2024-02-02 RX ADMIN — MONTELUKAST 10 MILLIGRAM(S): 4 TABLET, CHEWABLE ORAL at 12:02

## 2024-02-02 RX ADMIN — GABAPENTIN 100 MILLIGRAM(S): 400 CAPSULE ORAL at 07:54

## 2024-02-02 RX ADMIN — DULOXETINE HYDROCHLORIDE 60 MILLIGRAM(S): 30 CAPSULE, DELAYED RELEASE ORAL at 12:02

## 2024-02-02 NOTE — PROGRESS NOTE ADULT - SUBJECTIVE AND OBJECTIVE BOX
Cardiovascular Disease Progress Note    Overnight events: No acute events overnight.  refusing am meds. no new cardiac sx  Otherwise review of systems negative    Objective Findings:  T(C): 36.7 (24 @ 05:26), Max: 36.8 (24 @ 11:19)  HR: 97 (24 @ 05:26) (65 - 97)  BP: 127/67 (24 @ 05:26) (124/64 - 147/94)  RR: 18 (24 @ 05:26) (18 - 18)  SpO2: 93% (24 @ 05:26) (93% - 99%)  Wt(kg): --  Daily     Daily Weight in k (2024 10:00)      Physical Exam:  Gen: NAD  HEENT: EOMI  CV: RRR, normal S1 + S2, no m/r/g  Lungs: CTAB  Abd: soft, non-tender  Ext: No edema    Telemetry:    Laboratory Data:                        12.8   15.67 )-----------( 125      ( 2024 06:54 )             40.1     02-    140  |  102  |  83<H>  ----------------------------<  129<H>  4.0   |  25  |  0.77    Ca    9.3      2024 06:54  Phos  1.9     -  Mg     2.1         TPro  6.5  /  Alb  3.3  /  TBili  1.0  /  DBili  x   /  AST  20  /  ALT  16  /  AlkPhos  86  -              Inpatient Medications:  MEDICATIONS  (STANDING):  albuterol    90 MICROgram(s) HFA Inhaler 2 Puff(s) Inhalation every 6 hours  albuterol/ipratropium for Nebulization 3 milliLiter(s) Nebulizer every 20 minutes  albuterol/ipratropium for Nebulization 3 milliLiter(s) Nebulizer every 6 hours  amLODIPine   Tablet 5 milliGRAM(s) Oral daily  ascorbic acid 500 milliGRAM(s) Oral daily  aspirin enteric coated 81 milliGRAM(s) Oral daily  atorvastatin 40 milliGRAM(s) Oral at bedtime  buDESOnide    Inhalation Suspension 0.5 milliGRAM(s) Inhalation two times a day  busPIRone 10 milliGRAM(s) Oral two times a day  DULoxetine 60 milliGRAM(s) Oral daily  gabapentin 100 milliGRAM(s) Oral two times a day  heparin   Injectable 5000 Unit(s) SubCutaneous every 12 hours  hydrALAZINE 50 milliGRAM(s) Oral three times a day  influenza  Vaccine (HIGH DOSE) 0.7 milliLiter(s) IntraMuscular once  levothyroxine 125 MICROGram(s) Oral daily  montelukast 10 milliGRAM(s) Oral daily  multivitamin 1 Tablet(s) Oral daily  OLANZapine 10 milliGRAM(s) Oral at bedtime  potassium phosphate / sodium phosphate Powder (PHOS-NaK) 1 Packet(s) Oral two times a day  propranolol 20 milliGRAM(s) Oral two times a day  sodium chloride 0.9%. 1000 milliLiter(s) (75 mL/Hr) IV Continuous <Continuous>  tiotropium 2.5 MICROgram(s) Inhaler 2 Puff(s) Inhalation daily      Assessment:  73y F PMH COPD on 3 L NC at home, hypothyroid, diastolic CHF on Lasix, breast cancer s/p surgery, asthma, chronic lymphedema, kidney cancer  s/p partial nephrectomy, wheelchair-bound, obesity presenting for cough and diarrhea.  Patient states that she had diarrhea about 3 episodes per day for the past 2 days and has had continued dry cough for the past month.  Patient was recently admitted from  to  for SOB admitted with human metapneumovirus and acute COPD exacerbation.  Patient states that a day after she went home she started feeling sick again.  Denies fever, chest pain, shortness of breath, abdominal pain, nausea, vomiting, dysuria, constipation.  Endorses poor appetite  decreased p.o. intake.  Feels weak is now unable to transfer from her wheelchair to the bed as easily as before.     Recs:  cardiac stable  recent TTE (tds) --> wnl  hold diuretics in setting of ALDAIR and hypovolemia. resume as needed  leg elevation and ACE wraps to legs for lymphedema/venous insufficiency  will follow  consider psych eval  dvt ppx        Over 25 minutes spent on total encounter; more than 50% of the visit was spent counseling and/or coordinating care by the attending physician.      Juan Salcedo MD   Cardiovascular Disease  [phone]
Cardiovascular Disease Progress Note    Overnight events: No acute events overnight.  no new cardiac sx  Otherwise review of systems negative    Objective Findings:  T(C): 36.8 (02-01-24 @ 05:46), Max: 36.8 (01-31-24 @ 18:02)  HR: 66 (02-01-24 @ 05:46) (66 - 115)  BP: 127/55 (02-01-24 @ 05:46) (109/58 - 127/55)  RR: 18 (02-01-24 @ 05:46) (17 - 18)  SpO2: 97% (02-01-24 @ 05:46) (93% - 97%)  Wt(kg): --  Daily     Daily       Physical Exam:  Gen: NAD  HEENT: EOMI  CV: RRR, normal S1 + S2, no m/r/g  Lungs: CTAB  Abd: soft, non-tender  Ext: No edema    Telemetry:    Laboratory Data:                        12.9   19.40 )-----------( 135      ( 31 Jan 2024 07:12 )             40.8     01-31    140  |  101  |  110<H>  ----------------------------<  106<H>  3.6   |  24  |  1.24    Ca    9.3      31 Jan 2024 07:12      PT/INR - ( 30 Jan 2024 08:51 )   PT: 11.3 sec;   INR: 1.08 ratio           CARDIAC MARKERS ( 30 Jan 2024 14:32 )  x     / x     / 299 U/L / x     / 7.1 ng/mL          Inpatient Medications:  MEDICATIONS  (STANDING):  albuterol    90 MICROgram(s) HFA Inhaler 2 Puff(s) Inhalation every 6 hours  albuterol/ipratropium for Nebulization 3 milliLiter(s) Nebulizer every 6 hours  albuterol/ipratropium for Nebulization 3 milliLiter(s) Nebulizer every 20 minutes  amLODIPine   Tablet 5 milliGRAM(s) Oral daily  aspirin enteric coated 81 milliGRAM(s) Oral daily  atorvastatin 40 milliGRAM(s) Oral at bedtime  buDESOnide    Inhalation Suspension 0.5 milliGRAM(s) Inhalation two times a day  busPIRone 10 milliGRAM(s) Oral two times a day  DULoxetine 60 milliGRAM(s) Oral daily  gabapentin 100 milliGRAM(s) Oral two times a day  heparin   Injectable 5000 Unit(s) SubCutaneous every 12 hours  hydrALAZINE 50 milliGRAM(s) Oral three times a day  influenza  Vaccine (HIGH DOSE) 0.7 milliLiter(s) IntraMuscular once  levothyroxine 125 MICROGram(s) Oral daily  montelukast 10 milliGRAM(s) Oral daily  OLANZapine 10 milliGRAM(s) Oral at bedtime  propranolol 20 milliGRAM(s) Oral two times a day  sodium chloride 0.9%. 1000 milliLiter(s) (75 mL/Hr) IV Continuous <Continuous>  tiotropium 2.5 MICROgram(s) Inhaler 2 Puff(s) Inhalation daily      Assessment:  73y F PMH COPD on 3 L NC at home, hypothyroid, diastolic CHF on Lasix, breast cancer s/p surgery, asthma, chronic lymphedema, kidney cancer  s/p partial nephrectomy, wheelchair-bound, obesity presenting for cough and diarrhea.  Patient states that she had diarrhea about 3 episodes per day for the past 2 days and has had continued dry cough for the past month.  Patient was recently admitted from January 13 to January 20 for SOB admitted with human metapneumovirus and acute COPD exacerbation.  Patient states that a day after she went home she started feeling sick again.  Denies fever, chest pain, shortness of breath, abdominal pain, nausea, vomiting, dysuria, constipation.  Endorses poor appetite  decreased p.o. intake.  Feels weak is now unable to transfer from her wheelchair to the bed as easily as before.     Recs:  cardiac stable  recent TTE (tds) --> wnl  hold diuretics in setting of ALDAIR and hypovolemia. resume as needed  leg elevation and ACE wraps to legs for lymphedema/venous insufficiency  will follow  dvt ppx        Over 25 minutes spent on total encounter; more than 50% of the visit was spent counseling and/or coordinating care by the attending physician.      Juan Salcedo MD   Cardiovascular Disease  [phone]
Cardiovascular Disease Progress Note    Overnight events: No acute events overnight.  patient confused this morning. no cp/sob/palps  Otherwise review of systems negative    Objective Findings:  T(C): 36.3 (01-31-24 @ 05:30), Max: 36.6 (01-30-24 @ 21:25)  HR: 82 (01-31-24 @ 05:30) (73 - 94)  BP: 102/62 (01-31-24 @ 05:30) (102/62 - 139/77)  RR: 17 (01-31-24 @ 05:30) (16 - 17)  SpO2: 97% (01-31-24 @ 05:30) (97% - 98%)  Wt(kg): --  Daily     Daily       Physical Exam:  Gen: NAD  HEENT: EOMI  CV: RRR, normal S1 + S2, no m/r/g  Lungs: CTAB  Abd: soft, non-tender  Ext: No edema    Telemetry:    Laboratory Data:                        13.2   19.08 )-----------( 132      ( 30 Jan 2024 08:51 )             40.6     01-30    134<L>  |  93<L>  |  154<H>  ----------------------------<  137<H>  4.4   |  20<L>  |  2.28<H>    Ca    8.7      30 Jan 2024 08:51    TPro  6.6  /  Alb  3.4  /  TBili  0.9  /  DBili  x   /  AST  24  /  ALT  17  /  AlkPhos  94  01-29    PT/INR - ( 30 Jan 2024 08:51 )   PT: 11.3 sec;   INR: 1.08 ratio         PTT - ( 29 Jan 2024 18:05 )  PTT:45.6 sec  CARDIAC MARKERS ( 30 Jan 2024 14:32 )  x     / x     / 299 U/L / x     / 7.1 ng/mL          Inpatient Medications:  MEDICATIONS  (STANDING):  albuterol    90 MICROgram(s) HFA Inhaler 2 Puff(s) Inhalation every 6 hours  albuterol/ipratropium for Nebulization 3 milliLiter(s) Nebulizer every 20 minutes  albuterol/ipratropium for Nebulization 3 milliLiter(s) Nebulizer every 6 hours  amLODIPine   Tablet 5 milliGRAM(s) Oral daily  aspirin enteric coated 81 milliGRAM(s) Oral daily  atorvastatin 40 milliGRAM(s) Oral at bedtime  buDESOnide    Inhalation Suspension 0.5 milliGRAM(s) Inhalation two times a day  busPIRone 10 milliGRAM(s) Oral two times a day  DULoxetine 60 milliGRAM(s) Oral daily  gabapentin 100 milliGRAM(s) Oral two times a day  heparin   Injectable 5000 Unit(s) SubCutaneous every 12 hours  hydrALAZINE 50 milliGRAM(s) Oral three times a day  influenza  Vaccine (HIGH DOSE) 0.7 milliLiter(s) IntraMuscular once  levothyroxine 125 MICROGram(s) Oral daily  montelukast 10 milliGRAM(s) Oral daily  OLANZapine 10 milliGRAM(s) Oral at bedtime  predniSONE   Tablet 30 milliGRAM(s) Oral daily  propranolol 20 milliGRAM(s) Oral two times a day  sodium chloride 0.9%. 1000 milliLiter(s) (75 mL/Hr) IV Continuous <Continuous>  tiotropium 2.5 MICROgram(s) Inhaler 2 Puff(s) Inhalation daily      Assessment:  73y F PMH COPD on 3 L NC at home, hypothyroid, diastolic CHF on Lasix, breast cancer s/p surgery, asthma, chronic lymphedema, kidney cancer  s/p partial nephrectomy, wheelchair-bound, obesity presenting for cough and diarrhea.  Patient states that she had diarrhea about 3 episodes per day for the past 2 days and has had continued dry cough for the past month.  Patient was recently admitted from January 13 to January 20 for SOB admitted with human metapneumovirus and acute COPD exacerbation.  Patient states that a day after she went home she started feeling sick again.  Denies fever, chest pain, shortness of breath, abdominal pain, nausea, vomiting, dysuria, constipation.  Endorses poor appetite  decreased p.o. intake.  Feels weak is now unable to transfer from her wheelchair to the bed as easily as before.     Recs:  cardiac stable  recent TTE (tds) --> wnl  hold diuretics in setting of ALDAIR and hypovolemia  suggest broad spectrum abx, ICU consult, IV hydration and ID consult  leg elevation and ACE wraps to legs for lymphedema/venous insufficiency  will follow  dvt ppx          Over 25 minutes spent on total encounter; more than 50% of the visit was spent counseling and/or coordinating care by the attending physician.      Juan Salcedo MD   Cardiovascular Disease  [phone]

## 2024-02-02 NOTE — PROGRESS NOTE ADULT - ASSESSMENT
M E D I C A L   A T T E N D I N G    F O L L O W    U P   N O T E  (02-02-24 )                                     ------------------------------------------------------------------------------------------------    patient evaluated by me, case discussed with team, chart, medications, and physical exam reviewed, labs / tests  and vitals reviewed by me, as bellow.   Patient is stable for discharge today. patient gong home with home care..   Patient to follow up with  PMD, cardio, pulm..   See discharge document for full note (discussed with ACP).  [Greater than 35 min spent for these services. ]          ( Note written / Date of service 02-02-24 ( This is certified to be the same as "ENTERED" date above ( for billing purposes)))    ==================>> MEDICATIONS <<====================    albuterol    90 MICROgram(s) HFA Inhaler 2 Puff(s) Inhalation every 6 hours  albuterol/ipratropium for Nebulization 3 milliLiter(s) Nebulizer every 20 minutes  albuterol/ipratropium for Nebulization 3 milliLiter(s) Nebulizer every 6 hours  amLODIPine   Tablet 5 milliGRAM(s) Oral daily  ascorbic acid 500 milliGRAM(s) Oral daily  aspirin enteric coated 81 milliGRAM(s) Oral daily  atorvastatin 40 milliGRAM(s) Oral at bedtime  buDESOnide    Inhalation Suspension 0.5 milliGRAM(s) Inhalation two times a day  busPIRone 10 milliGRAM(s) Oral two times a day  DULoxetine 60 milliGRAM(s) Oral daily  gabapentin 100 milliGRAM(s) Oral two times a day  heparin   Injectable 5000 Unit(s) SubCutaneous every 12 hours  hydrALAZINE 50 milliGRAM(s) Oral three times a day  influenza  Vaccine (HIGH DOSE) 0.7 milliLiter(s) IntraMuscular once  levothyroxine 125 MICROGram(s) Oral daily  montelukast 10 milliGRAM(s) Oral daily  multivitamin 1 Tablet(s) Oral daily  OLANZapine 10 milliGRAM(s) Oral at bedtime  propranolol 20 milliGRAM(s) Oral two times a day  sodium chloride 0.9%. 1000 milliLiter(s) IV Continuous <Continuous>  tiotropium 2.5 MICROgram(s) Inhaler 2 Puff(s) Inhalation daily    MEDICATIONS  (PRN):  acetaminophen     Tablet .. 650 milliGRAM(s) Oral every 6 hours PRN Temp greater or equal to 38C (100.4F), Mild Pain (1 - 3)  aluminum hydroxide/magnesium hydroxide/simethicone Suspension 30 milliLiter(s) Oral every 4 hours PRN Dyspepsia  melatonin 3 milliGRAM(s) Oral at bedtime PRN Insomnia  ondansetron Injectable 4 milliGRAM(s) IV Push every 8 hours PRN Nausea and/or Vomiting    ___________  Active diet:  Diet, Regular:   DASH/TLC Sodium & Cholesterol Restricted (DASH)  ___________________    ==================>> VITAL SIGNS <<==================    T(C): 36.6 (02-02-24 @ 12:01), Max: 36.7 (02-02-24 @ 05:26)  HR: 75 (02-02-24 @ 12:01) (70 - 97)  BP: 146/74 (02-02-24 @ 12:01) (124/64 - 147/94)  BP(mean): --  RR: 18 (02-02-24 @ 12:01) (18 - 18)  SpO2: 98% (02-02-24 @ 12:01) (93% - 99%)       I&O's Summary    01 Feb 2024 07:01  -  02 Feb 2024 07:00  --------------------------------------------------------  IN: 0 mL / OUT: 950 mL / NET: -950 mL       ==================>> LAB AND IMAGING <<==================                        12.4   15.28 )-----------( 129      ( 02 Feb 2024 07:05 )             39.4        02-02    142  |  105  |  47<H>  ----------------------------<  96  4.6   |  25  |  0.66    Ca    9.4      02 Feb 2024 07:06  Phos  1.5     02-02  Mg     2.1     02-01    TPro  6.5  /  Alb  3.3  /  TBili  1.0  /  DBili  x   /  AST  20  /  ALT  16  /  AlkPhos  86  02-01      WBC count:   15.28 <<== ,  15.67 <<== ,  19.40 <<== ,  19.08 <<== ,  19.54 <<==   Hemoglobin:   12.4 <<==,  12.8 <<==,  12.9 <<==,  13.2 <<==,  13.6 <<==  platelets:  129 <==, 125 <==, 135 <==, 132 <==, 152 <==    Creatinine:  0.66  <<==, 0.77  <<==, 1.24  <<==, 2.28  <<==, 2.65  <<==  Sodium:   142  <==, 140  <==, 140  <==, 134  <==, 130  <==       AST:          20(02-01) <== , 24(01-29) <==      ALT:        16(02-01)  <== , 17(01-29)  <==      AP:        86(02-01)  <=, 94(01-29)  <=     Bili:        1.0(02-01)  <=, 0.9(01-29)  <=        ( Note written / Date of service 02-02-24 ( This is certified to be the same as "ENTERED" date above ( for billing Purposes )))

## 2024-02-03 ENCOUNTER — TRANSCRIPTION ENCOUNTER (OUTPATIENT)
Age: 74
End: 2024-02-03

## 2024-02-07 ENCOUNTER — EMERGENCY (EMERGENCY)
Facility: HOSPITAL | Age: 74
LOS: 0 days | Discharge: HOME | End: 2024-02-07
Attending: EMERGENCY MEDICINE
Payer: MEDICARE

## 2024-02-07 VITALS
DIASTOLIC BLOOD PRESSURE: 80 MMHG | TEMPERATURE: 98 F | HEIGHT: 62 IN | SYSTOLIC BLOOD PRESSURE: 180 MMHG | WEIGHT: 199.96 LBS | OXYGEN SATURATION: 96 % | RESPIRATION RATE: 20 BRPM | HEART RATE: 98 BPM

## 2024-02-07 VITALS
OXYGEN SATURATION: 92 % | SYSTOLIC BLOOD PRESSURE: 140 MMHG | HEART RATE: 88 BPM | TEMPERATURE: 98 F | DIASTOLIC BLOOD PRESSURE: 62 MMHG | RESPIRATION RATE: 19 BRPM

## 2024-02-07 DIAGNOSIS — S01.21XA LACERATION WITHOUT FOREIGN BODY OF NOSE, INITIAL ENCOUNTER: ICD-10-CM

## 2024-02-07 DIAGNOSIS — Z98.89 OTHER SPECIFIED POSTPROCEDURAL STATES: Chronic | ICD-10-CM

## 2024-02-07 DIAGNOSIS — Z87.01 PERSONAL HISTORY OF PNEUMONIA (RECURRENT): ICD-10-CM

## 2024-02-07 DIAGNOSIS — Z91.018 ALLERGY TO OTHER FOODS: ICD-10-CM
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
## 2024-02-07 DIAGNOSIS — R04.0 EPISTAXIS: ICD-10-CM

## 2024-02-07 DIAGNOSIS — Z23 ENCOUNTER FOR IMMUNIZATION: ICD-10-CM
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
## 2024-02-07 DIAGNOSIS — Z91.013 ALLERGY TO SEAFOOD: ICD-10-CM

## 2024-02-07 DIAGNOSIS — Z90.49 ACQUIRED ABSENCE OF OTHER SPECIFIED PARTS OF DIGESTIVE TRACT: Chronic | ICD-10-CM

## 2024-02-07 DIAGNOSIS — Z90.5 ACQUIRED ABSENCE OF KIDNEY: Chronic | ICD-10-CM

## 2024-02-07 PROCEDURE — 70486 CT MAXILLOFACIAL W/O DYE: CPT | Mod: 26,MA

## 2024-02-07 PROCEDURE — 72125 CT NECK SPINE W/O DYE: CPT | Mod: 26,MA

## 2024-02-07 PROCEDURE — 70450 CT HEAD/BRAIN W/O DYE: CPT | Mod: 26,MA

## 2024-02-07 PROCEDURE — 99284 EMERGENCY DEPT VISIT MOD MDM: CPT | Mod: 25

## 2024-02-07 PROCEDURE — G0168: CPT

## 2024-02-07 RX ORDER — ACETAMINOPHEN 500 MG
975 TABLET ORAL ONCE
Refills: 0 | Status: COMPLETED | OUTPATIENT
Start: 2024-02-07 | End: 2024-02-07

## 2024-02-07 RX ORDER — IBUPROFEN 200 MG
600 TABLET ORAL ONCE
Refills: 0 | Status: COMPLETED | OUTPATIENT
Start: 2024-02-07 | End: 2024-02-07

## 2024-02-07 RX ORDER — TETANUS TOXOID, REDUCED DIPHTHERIA TOXOID AND ACELLULAR PERTUSSIS VACCINE, ADSORBED 5; 2.5; 8; 8; 2.5 [IU]/.5ML; [IU]/.5ML; UG/.5ML; UG/.5ML; UG/.5ML
0.5 SUSPENSION INTRAMUSCULAR ONCE
Refills: 0 | Status: COMPLETED | OUTPATIENT
Start: 2024-02-07 | End: 2024-02-07

## 2024-02-07 RX ADMIN — Medication 975 MILLIGRAM(S): at 12:46

## 2024-02-07 RX ADMIN — Medication 975 MILLIGRAM(S): at 11:46

## 2024-02-07 RX ADMIN — Medication 600 MILLIGRAM(S): at 16:34

## 2024-02-07 RX ADMIN — TETANUS TOXOID, REDUCED DIPHTHERIA TOXOID AND ACELLULAR PERTUSSIS VACCINE, ADSORBED 0.5 MILLILITER(S): 5; 2.5; 8; 8; 2.5 SUSPENSION INTRAMUSCULAR at 10:35

## 2024-02-07 RX ADMIN — Medication 600 MILLIGRAM(S): at 17:34

## 2024-02-07 NOTE — ED ADULT NURSE NOTE - NSFALLRISKINTERV_ED_ALL_ED

## 2024-02-07 NOTE — ED ADULT TRIAGE NOTE - CHIEF COMPLAINT QUOTE
Tripped over socks hit nose laceration noted with dried blood all over face, obese and constantly fidgety  No LOC, takes thinners  H/O HTN

## 2024-02-07 NOTE — ED ADULT NURSE NOTE - OBJECTIVE STATEMENT
73 year old female A/Ox3 c/o left side pain after a fall today, pt reports she tripped and fell onto her face, noted laceration to face and bleeding, pt reports to taking blood thinners daily, at this time pt denies dizziness, lightheadedness, n/v

## 2024-02-07 NOTE — ED PROVIDER NOTE - PATIENT PORTAL LINK FT
You can access the FollowMyHealth Patient Portal offered by [facility] by registering at the following website: [email or web address]. By joining [facility]’s FollowMyHealth portal, you will also be able to view your health information using other applications (apps) compatible with our system.

## 2024-02-07 NOTE — ED PROVIDER NOTE - OBJECTIVE STATEMENT
Pertinent PMH/PSH/FHx/SHx and Review of Systems contained within:  Patient presents to the ED for trip and fall.  Patient present with home aide.  At baseline is minimally ambulatory, has a walker to shift from bed to wheelchair.  Aide was helping her shift today when she tripped and fell forward, hitting her face.  Patient had no LOC, had BL epistaxis and sustained small lac to nose.  She denies neck pain, other injury.  No prodrome preceded fall.  Patient was recently admitted for pneumonia.  Denies use of blood thinners other than aspirin. Tetanus is not UTD.    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    ROS: No fever/chills, No headache/photophobia/eye pain/changes in vision, No ear pain/sore throat/dysphagia, No chest pain/palpitations, no SOB/cough/wheeze/stridor, No abdominal pain, No N/V/D/melena, no dysuria/frequency/discharge, No neck/back pain, no rash, no changes in neurological status/function.

## 2024-02-07 NOTE — ED PROVIDER NOTE - CLINICAL SUMMARY MEDICAL DECISION MAKING FREE TEXT BOX
Patient s/p mechanical fall, facial injury, likely nasal bone fracture.  VSS.  ABCs intact.  Nasal ridge cleaned, wound is fairly superficial without bony exposure, closed with dermabond and steristrips.  Tetanus updated.  Will imaging head, neck.  Patient was examined head to toe and screened for additional injuries, no other significant injuries identified by exam or patient report. Patient s/p mechanical fall, facial injury, likely nasal bone fracture.  VSS.  ABCs intact.  Nasal ridge cleaned, wound is fairly superficial without bony exposure, closed with dermabond and steristrips.  Tetanus updated.  Will imaging head, neck.  Patient was examined head to toe and screened for additional injuries, no other significant injuries identified by exam or patient report.    DC note:  CT imaging negative for acute fractures.  CT maxillofacial findings reviewed, consistent with her h/o Graves Disease, has no periorbital swelling, BL EOMI.  Wound care d/w patient, aide, and  at bedside. Discussed results and outcome of today's visit with the patient/family, copy of results given with discharge.  Patient advised to arrange luther follow up with their PMD and/or any provided referral(s) within the next few days and given precautions to return to the Emergency Department for any worsening symptoms.

## 2024-02-08 ENCOUNTER — TRANSCRIPTION ENCOUNTER (OUTPATIENT)
Age: 74
End: 2024-02-08

## 2024-02-09 ENCOUNTER — APPOINTMENT (OUTPATIENT)
Dept: CARE COORDINATION | Facility: HOME HEALTH | Age: 74
End: 2024-02-09
Payer: MEDICARE

## 2024-02-09 VITALS
BODY MASS INDEX: 39.27 KG/M2 | HEART RATE: 98 BPM | TEMPERATURE: 98.1 F | SYSTOLIC BLOOD PRESSURE: 140 MMHG | WEIGHT: 200 LBS | DIASTOLIC BLOOD PRESSURE: 60 MMHG | OXYGEN SATURATION: 99 % | HEIGHT: 60 IN | RESPIRATION RATE: 18 BRPM

## 2024-02-09 DIAGNOSIS — I50.33 ACUTE ON CHRONIC DIASTOLIC (CONGESTIVE) HEART FAILURE: ICD-10-CM

## 2024-02-09 DIAGNOSIS — R60.0 LOCALIZED EDEMA: ICD-10-CM

## 2024-02-09 DIAGNOSIS — K58.0 IRRITABLE BOWEL SYNDROME WITH DIARRHEA: ICD-10-CM

## 2024-02-09 DIAGNOSIS — J44.1 CHRONIC OBSTRUCTIVE PULMONARY DISEASE WITH (ACUTE) EXACERBATION: ICD-10-CM

## 2024-02-09 PROCEDURE — 99495 TRANSJ CARE MGMT MOD F2F 14D: CPT

## 2024-02-13 ENCOUNTER — INPATIENT (INPATIENT)
Facility: HOSPITAL | Age: 74
LOS: 4 days | Discharge: HOME | DRG: 871 | End: 2024-02-18
Attending: GENERAL PRACTICE | Admitting: GENERAL PRACTICE
Payer: MEDICARE

## 2024-02-13 VITALS
DIASTOLIC BLOOD PRESSURE: 93 MMHG | HEIGHT: 62 IN | OXYGEN SATURATION: 94 % | RESPIRATION RATE: 24 BRPM | TEMPERATURE: 98 F | HEART RATE: 116 BPM | SYSTOLIC BLOOD PRESSURE: 151 MMHG

## 2024-02-13 DIAGNOSIS — J96.10 CHRONIC RESPIRATORY FAILURE, UNSPECIFIED WHETHER WITH HYPOXIA OR HYPERCAPNIA: ICD-10-CM

## 2024-02-13 DIAGNOSIS — R65.10 SYSTEMIC INFLAMMATORY RESPONSE SYNDROME (SIRS) OF NON-INFECTIOUS ORIGIN WITHOUT ACUTE ORGAN DYSFUNCTION: ICD-10-CM

## 2024-02-13 DIAGNOSIS — I50.9 HEART FAILURE, UNSPECIFIED: ICD-10-CM

## 2024-02-13 DIAGNOSIS — Z29.9 ENCOUNTER FOR PROPHYLACTIC MEASURES, UNSPECIFIED: ICD-10-CM

## 2024-02-13 DIAGNOSIS — Z98.89 OTHER SPECIFIED POSTPROCEDURAL STATES: Chronic | ICD-10-CM

## 2024-02-13 DIAGNOSIS — Z90.5 ACQUIRED ABSENCE OF KIDNEY: Chronic | ICD-10-CM

## 2024-02-13 DIAGNOSIS — I50.33 ACUTE ON CHRONIC DIASTOLIC (CONGESTIVE) HEART FAILURE: ICD-10-CM

## 2024-02-13 DIAGNOSIS — Z90.49 ACQUIRED ABSENCE OF OTHER SPECIFIED PARTS OF DIGESTIVE TRACT: Chronic | ICD-10-CM

## 2024-02-13 DIAGNOSIS — N17.9 ACUTE KIDNEY FAILURE, UNSPECIFIED: ICD-10-CM

## 2024-02-13 DIAGNOSIS — J44.9 CHRONIC OBSTRUCTIVE PULMONARY DISEASE, UNSPECIFIED: ICD-10-CM

## 2024-02-13 DIAGNOSIS — R79.89 OTHER SPECIFIED ABNORMAL FINDINGS OF BLOOD CHEMISTRY: ICD-10-CM

## 2024-02-13 PROBLEM — K58.0 IRRITABLE BOWEL SYNDROME WITH DIARRHEA: Status: ACTIVE | Noted: 2020-08-17

## 2024-02-13 PROBLEM — J44.1 CHRONIC OBSTRUCTIVE PULMONARY DISEASE WITH ACUTE EXACERBATION: Status: ACTIVE | Noted: 2018-07-24

## 2024-02-13 PROBLEM — R60.0 BILATERAL LEG EDEMA: Status: ACTIVE | Noted: 2020-08-17

## 2024-02-13 LAB
ALBUMIN SERPL ELPH-MCNC: 2.2 G/DL — LOW (ref 3.5–5)
ALP SERPL-CCNC: 101 U/L — SIGNIFICANT CHANGE UP (ref 40–120)
ALT FLD-CCNC: 27 U/L DA — SIGNIFICANT CHANGE UP (ref 10–60)
ANION GAP SERPL CALC-SCNC: 13 MMOL/L — SIGNIFICANT CHANGE UP (ref 5–17)
ANISOCYTOSIS BLD QL: SLIGHT — SIGNIFICANT CHANGE UP
APPEARANCE UR: ABNORMAL
AST SERPL-CCNC: 45 U/L — HIGH (ref 10–40)
BACTERIA # UR AUTO: ABNORMAL /HPF
BASE EXCESS BLDV CALC-SCNC: -8 MMOL/L — SIGNIFICANT CHANGE UP
BASOPHILS # BLD AUTO: 0 K/UL — SIGNIFICANT CHANGE UP (ref 0–0.2)
BASOPHILS NFR BLD AUTO: 0 % — SIGNIFICANT CHANGE UP (ref 0–2)
BILIRUB SERPL-MCNC: 0.7 MG/DL — SIGNIFICANT CHANGE UP (ref 0.2–1.2)
BILIRUB UR-MCNC: NEGATIVE — SIGNIFICANT CHANGE UP
BUN SERPL-MCNC: 106 MG/DL — HIGH (ref 7–18)
CA-I SERPL-SCNC: SIGNIFICANT CHANGE UP MMOL/L (ref 1.15–1.33)
CALCIUM SERPL-MCNC: 9.1 MG/DL — SIGNIFICANT CHANGE UP (ref 8.4–10.5)
CHLORIDE SERPL-SCNC: 103 MMOL/L — SIGNIFICANT CHANGE UP (ref 96–108)
CK SERPL-CCNC: 722 U/L — HIGH (ref 21–215)
CO2 SERPL-SCNC: 18 MMOL/L — LOW (ref 22–31)
COLOR SPEC: YELLOW — SIGNIFICANT CHANGE UP
CREAT ?TM UR-MCNC: 35 MG/DL — SIGNIFICANT CHANGE UP
CREAT SERPL-MCNC: 2.98 MG/DL — HIGH (ref 0.5–1.3)
DIFF PNL FLD: ABNORMAL
EGFR: 16 ML/MIN/1.73M2 — LOW
EOSINOPHIL # BLD AUTO: 0.53 K/UL — HIGH (ref 0–0.5)
EOSINOPHIL NFR BLD AUTO: 3 % — SIGNIFICANT CHANGE UP (ref 0–6)
EPI CELLS # UR: PRESENT
FLUAV AG NPH QL: SIGNIFICANT CHANGE UP
FLUBV AG NPH QL: SIGNIFICANT CHANGE UP
GAS PNL BLDV: 131 MMOL/L — LOW (ref 136–145)
GAS PNL BLDV: SIGNIFICANT CHANGE UP
GLUCOSE SERPL-MCNC: 150 MG/DL — HIGH (ref 70–99)
GLUCOSE UR QL: NEGATIVE MG/DL — SIGNIFICANT CHANGE UP
HCO3 BLDV-SCNC: 19 MMOL/L — LOW (ref 22–29)
HCT VFR BLD CALC: 32.2 % — LOW (ref 34.5–45)
HGB BLD-MCNC: 10 G/DL — LOW (ref 11.5–15.5)
HYPOCHROMIA BLD QL: SLIGHT — SIGNIFICANT CHANGE UP
HYPOSEGMENTATION: PRESENT — SIGNIFICANT CHANGE UP
KETONES UR-MCNC: NEGATIVE MG/DL — SIGNIFICANT CHANGE UP
LACTATE BLDV-MCNC: 2.1 MMOL/L — HIGH (ref 0.5–2)
LACTATE SERPL-SCNC: 1.9 MMOL/L — SIGNIFICANT CHANGE UP (ref 0.7–2)
LEUKOCYTE ESTERASE UR-ACNC: ABNORMAL
LYMPHOCYTES # BLD AUTO: 0.88 K/UL — LOW (ref 1–3.3)
LYMPHOCYTES # BLD AUTO: 5 % — LOW (ref 13–44)
MANUAL SMEAR VERIFICATION: SIGNIFICANT CHANGE UP
MCHC RBC-ENTMCNC: 30.3 PG — SIGNIFICANT CHANGE UP (ref 27–34)
MCHC RBC-ENTMCNC: 31.1 GM/DL — LOW (ref 32–36)
MCV RBC AUTO: 97.6 FL — SIGNIFICANT CHANGE UP (ref 80–100)
MONOCYTES # BLD AUTO: 1.23 K/UL — HIGH (ref 0–0.9)
MONOCYTES NFR BLD AUTO: 7 % — SIGNIFICANT CHANGE UP (ref 2–14)
MYELOCYTES NFR BLD: 1 % — HIGH (ref 0–0)
NEUTROPHILS # BLD AUTO: 14.71 K/UL — HIGH (ref 1.8–7.4)
NEUTROPHILS NFR BLD AUTO: 70 % — SIGNIFICANT CHANGE UP (ref 43–77)
NEUTS BAND # BLD: 14 % — HIGH (ref 0–8)
NITRITE UR-MCNC: NEGATIVE — SIGNIFICANT CHANGE UP
NRBC # BLD: 0 /100 WBCS — SIGNIFICANT CHANGE UP (ref 0–0)
NT-PROBNP SERPL-SCNC: HIGH PG/ML (ref 0–450)
PCO2 BLDV: 45 MMHG — HIGH (ref 39–42)
PH BLDV: 7.24 — LOW (ref 7.32–7.43)
PH UR: 5 — SIGNIFICANT CHANGE UP (ref 5–8)
PLAT MORPH BLD: NORMAL — SIGNIFICANT CHANGE UP
PLATELET # BLD AUTO: 198 K/UL — SIGNIFICANT CHANGE UP (ref 150–400)
PLATELET COUNT - ESTIMATE: NORMAL — SIGNIFICANT CHANGE UP
PO2 BLDV: 44 MMHG — SIGNIFICANT CHANGE UP
POIKILOCYTOSIS BLD QL AUTO: SLIGHT — SIGNIFICANT CHANGE UP
POLYCHROMASIA BLD QL SMEAR: SLIGHT — SIGNIFICANT CHANGE UP
POTASSIUM BLDV-SCNC: 5 MMOL/L — SIGNIFICANT CHANGE UP (ref 3.5–5.1)
POTASSIUM SERPL-MCNC: 5.1 MMOL/L — SIGNIFICANT CHANGE UP (ref 3.5–5.3)
POTASSIUM SERPL-SCNC: 5.1 MMOL/L — SIGNIFICANT CHANGE UP (ref 3.5–5.3)
PROT SERPL-MCNC: 6.7 G/DL — SIGNIFICANT CHANGE UP (ref 6–8.3)
PROT UR-MCNC: ABNORMAL MG/DL
RBC # BLD: 3.3 M/UL — LOW (ref 3.8–5.2)
RBC # FLD: 16.3 % — HIGH (ref 10.3–14.5)
RBC BLD AUTO: ABNORMAL
RBC CASTS # UR COMP ASSIST: 5 /HPF — HIGH (ref 0–4)
SAO2 % BLDV: 66.3 % — SIGNIFICANT CHANGE UP
SARS-COV-2 RNA SPEC QL NAA+PROBE: SIGNIFICANT CHANGE UP
SODIUM SERPL-SCNC: 134 MMOL/L — LOW (ref 135–145)
SP GR SPEC: 1.01 — SIGNIFICANT CHANGE UP (ref 1–1.03)
TROPONIN I, HIGH SENSITIVITY RESULT: 105.7 NG/L — HIGH
TSH SERPL-MCNC: 2.76 UU/ML — SIGNIFICANT CHANGE UP (ref 0.34–4.82)
UROBILINOGEN FLD QL: 0.2 MG/DL — SIGNIFICANT CHANGE UP (ref 0.2–1)
WBC # BLD: 17.51 K/UL — HIGH (ref 3.8–10.5)
WBC # FLD AUTO: 17.51 K/UL — HIGH (ref 3.8–10.5)
WBC UR QL: 25 /HPF — HIGH (ref 0–5)

## 2024-02-13 PROCEDURE — 72170 X-RAY EXAM OF PELVIS: CPT | Mod: 26

## 2024-02-13 PROCEDURE — 99285 EMERGENCY DEPT VISIT HI MDM: CPT

## 2024-02-13 PROCEDURE — 73590 X-RAY EXAM OF LOWER LEG: CPT | Mod: 26,50

## 2024-02-13 PROCEDURE — 70450 CT HEAD/BRAIN W/O DYE: CPT | Mod: 26,MA

## 2024-02-13 PROCEDURE — 71045 X-RAY EXAM CHEST 1 VIEW: CPT | Mod: 26

## 2024-02-13 PROCEDURE — 73564 X-RAY EXAM KNEE 4 OR MORE: CPT | Mod: 26,50

## 2024-02-13 PROCEDURE — 73552 X-RAY EXAM OF FEMUR 2/>: CPT | Mod: 26,50

## 2024-02-13 PROCEDURE — 72125 CT NECK SPINE W/O DYE: CPT | Mod: 26,MA

## 2024-02-13 RX ORDER — TIOTROPIUM BROMIDE 18 UG/1
2 CAPSULE ORAL; RESPIRATORY (INHALATION) DAILY
Refills: 0 | Status: DISCONTINUED | OUTPATIENT
Start: 2024-02-13 | End: 2024-02-18

## 2024-02-13 RX ORDER — LANOLIN ALCOHOL/MO/W.PET/CERES
3 CREAM (GRAM) TOPICAL AT BEDTIME
Refills: 0 | Status: DISCONTINUED | OUTPATIENT
Start: 2024-02-13 | End: 2024-02-18

## 2024-02-13 RX ORDER — ASCORBIC ACID 60 MG
500 TABLET,CHEWABLE ORAL DAILY
Refills: 0 | Status: DISCONTINUED | OUTPATIENT
Start: 2024-02-13 | End: 2024-02-14

## 2024-02-13 RX ORDER — IPRATROPIUM/ALBUTEROL SULFATE 18-103MCG
3 AEROSOL WITH ADAPTER (GRAM) INHALATION ONCE
Refills: 0 | Status: COMPLETED | OUTPATIENT
Start: 2024-02-13 | End: 2024-02-13

## 2024-02-13 RX ORDER — FUROSEMIDE 40 MG
40 TABLET ORAL ONCE
Refills: 0 | Status: COMPLETED | OUTPATIENT
Start: 2024-02-13 | End: 2024-02-13

## 2024-02-13 RX ORDER — FUROSEMIDE 40 MG
40 TABLET ORAL DAILY
Refills: 0 | Status: DISCONTINUED | OUTPATIENT
Start: 2024-02-13 | End: 2024-02-14

## 2024-02-13 RX ORDER — SODIUM CHLORIDE 9 MG/ML
1000 INJECTION INTRAMUSCULAR; INTRAVENOUS; SUBCUTANEOUS ONCE
Refills: 0 | Status: COMPLETED | OUTPATIENT
Start: 2024-02-13 | End: 2024-02-13

## 2024-02-13 RX ORDER — ACETAMINOPHEN 500 MG
1000 TABLET ORAL ONCE
Refills: 0 | Status: COMPLETED | OUTPATIENT
Start: 2024-02-13 | End: 2024-02-13

## 2024-02-13 RX ORDER — HEPARIN SODIUM 5000 [USP'U]/ML
5000 INJECTION INTRAVENOUS; SUBCUTANEOUS EVERY 12 HOURS
Refills: 0 | Status: DISCONTINUED | OUTPATIENT
Start: 2024-02-13 | End: 2024-02-15

## 2024-02-13 RX ORDER — ACETAMINOPHEN 500 MG
650 TABLET ORAL EVERY 6 HOURS
Refills: 0 | Status: DISCONTINUED | OUTPATIENT
Start: 2024-02-13 | End: 2024-02-18

## 2024-02-13 RX ORDER — ATORVASTATIN CALCIUM 80 MG/1
40 TABLET, FILM COATED ORAL AT BEDTIME
Refills: 0 | Status: DISCONTINUED | OUTPATIENT
Start: 2024-02-13 | End: 2024-02-18

## 2024-02-13 RX ORDER — DULOXETINE HYDROCHLORIDE 30 MG/1
60 CAPSULE, DELAYED RELEASE ORAL DAILY
Refills: 0 | Status: DISCONTINUED | OUTPATIENT
Start: 2024-02-13 | End: 2024-02-18

## 2024-02-13 RX ORDER — OLANZAPINE 15 MG/1
10 TABLET, FILM COATED ORAL AT BEDTIME
Refills: 0 | Status: DISCONTINUED | OUTPATIENT
Start: 2024-02-13 | End: 2024-02-18

## 2024-02-13 RX ORDER — ASPIRIN/CALCIUM CARB/MAGNESIUM 324 MG
81 TABLET ORAL DAILY
Refills: 0 | Status: DISCONTINUED | OUTPATIENT
Start: 2024-02-14 | End: 2024-02-18

## 2024-02-13 RX ORDER — GABAPENTIN 400 MG/1
100 CAPSULE ORAL
Refills: 0 | Status: DISCONTINUED | OUTPATIENT
Start: 2024-02-13 | End: 2024-02-18

## 2024-02-13 RX ORDER — ALBUTEROL 90 UG/1
2 AEROSOL, METERED ORAL EVERY 6 HOURS
Refills: 0 | Status: DISCONTINUED | OUTPATIENT
Start: 2024-02-13 | End: 2024-02-18

## 2024-02-13 RX ORDER — LEVOTHYROXINE SODIUM 125 MCG
125 TABLET ORAL DAILY
Refills: 0 | Status: DISCONTINUED | OUTPATIENT
Start: 2024-02-14 | End: 2024-02-18

## 2024-02-13 RX ADMIN — SODIUM CHLORIDE 1000 MILLILITER(S): 9 INJECTION INTRAMUSCULAR; INTRAVENOUS; SUBCUTANEOUS at 08:54

## 2024-02-13 RX ADMIN — Medication 40 MILLIGRAM(S): at 18:31

## 2024-02-13 RX ADMIN — OLANZAPINE 10 MILLIGRAM(S): 15 TABLET, FILM COATED ORAL at 23:11

## 2024-02-13 RX ADMIN — Medication 10 MILLIGRAM(S): at 23:10

## 2024-02-13 RX ADMIN — Medication 3 MILLILITER(S): at 07:53

## 2024-02-13 RX ADMIN — Medication 400 MILLIGRAM(S): at 13:02

## 2024-02-13 RX ADMIN — ATORVASTATIN CALCIUM 40 MILLIGRAM(S): 80 TABLET, FILM COATED ORAL at 23:10

## 2024-02-13 RX ADMIN — HEPARIN SODIUM 5000 UNIT(S): 5000 INJECTION INTRAVENOUS; SUBCUTANEOUS at 23:10

## 2024-02-13 RX ADMIN — Medication 1000 MILLIGRAM(S): at 14:10

## 2024-02-13 RX ADMIN — Medication 40 MILLIGRAM(S): at 13:14

## 2024-02-13 RX ADMIN — GABAPENTIN 100 MILLIGRAM(S): 400 CAPSULE ORAL at 18:30

## 2024-02-13 RX ADMIN — Medication 3 MILLIGRAM(S): at 23:10

## 2024-02-13 NOTE — ED ADULT NURSE NOTE - NSFALLRISKINTERV_ED_ALL_ED
Assistance OOB with selected safe patient handling equipment if applicable/Communicate fall risk and risk factors to all staff, patient, and family/Monitor for mental status changes and reorient to person, place, and time, as needed/Move patient closer to nursing station/within visual sight of ED staff/Provide visual cue: yellow wristband, yellow gown, etc/Reinforce activity limits and safety measures with patient and family/Toileting schedule using arm’s reach rule for commode and bathroom/Use of alarms - bed, stretcher, chair and/or video monitoring/Call bell, personal items and telephone in reach/Instruct patient to call for assistance before getting out of bed/chair/stretcher/Non-slip footwear applied when patient is off stretcher/[place] to call system/Physically safe environment - no spills, clutter or unnecessary equipment/Purposeful Proactive Rounding/Room/bathroom lighting operational, light cord in reach

## 2024-02-13 NOTE — H&P ADULT - NSHPADDITIONALINFOADULT_GEN_ALL_CORE
Patient evaluated, case discussed with me, chart reviewed, agree with above H/P as reviewed.  Dr. DIANE Welch ([phone])

## 2024-02-13 NOTE — H&P ADULT - PROBLEM SELECTOR PLAN 6
Baseline Cr 0.6 to 0.7  Likely secondary, but not limited to prerenal, post renal, CHF  F/U Urine Lytes  Calculate FeUrea  Currently on Diuresis for CHF

## 2024-02-13 NOTE — ED ADULT NURSE NOTE - NURSING ED SKIN COLOR
[facility]  Discharge Summary  [facility]      Patient Name:  Arsenio Lima  MRN: [id number]  Admission Date: 2017    Subjective:     Delivery Date: 2017   Delivery Time: 9:24 PM   Delivery Type: , Low Transverse     Maternal History:   Arsenio Lima is a 3 days day old 37w4d   born to a mother who is a 26 y.o.   . She has a past medical history of Asthma. .     Prenatal Labs Review:  ABO/Rh:   Lab Results   Component Value Date/Time    GROUPTRH O POS 2017 03:55 PM    GROUPTRH O POS 2013 12:15 PM     Group B Beta Strep:   Lab Results   Component Value Date/Time    STREPBCULT No Group B Streptococcus isolated 2017 03:50 PM     HIV:   Lab Results   Component Value Date/Time    VAG54IMQK Negative 2017 01:35 PM    MXU57WAEI Negative 2017 01:35 PM     RPR:   Lab Results   Component Value Date/Time    RPR Non-reactive 2017 01:35 PM    RPR Non-reactive 2017 01:35 PM     Hepatitis B Surface Antigen:   Lab Results   Component Value Date/Time    HEPBSAG Negative 2016 11:48 AM     Rubella Immune Status:   Lab Results   Component Value Date/Time    RUBELLAIMMUN Reactive 2016 11:48 AM       Pregnancy/Delivery Course (synopsis of major diagnoses, care, treatment, and services provided during the course of the hospital stay):    The pregnancy was complicated by asthma. Prenatal ultrasound revealed normal anatomy and oligohydramnios. Prenatal care was good. Mother received no medications. Membranes ruptured at delivery. The delivery was uncomplicated. Apgar scores      Assessment:    1 Minute:   Skin color:     Muscle tone:     Heart rate:     Breathing:     Grimace:     Total:  10            5 Minute:   Skin color:     Muscle tone:     Heart rate:     Breathing:     Grimace:     Total:  10            10 Minute:   Skin color:     Muscle tone:     Heart rate:     Breathing:     Grimace:     Total:             "  Living Status:        .    Review of Systems    Objective:     Admission GA: 37w4d   Admission Weight: 3.31 kg (7 lb 4.8 oz) (Filed from Delivery Summary)  Admission  Head Cir: 33.7 cm (13.25") (Filed from Delivery Summary)   Admission Length: Height: 1' 7" (48.3 cm) (Filed from Delivery Summary)    Delivery Method: , Low Transverse       Feeding Method: Breastmilk     Labs:  Recent Results (from the past 168 hour(s))   Cord Blood Evaluation    Collection Time: 05/10/17 12:08 AM   Result Value Ref Range    Cord ABO O NEG     Cord Direct Frankie NEG    Bilirubin, Total,     Collection Time: 05/10/17 10:11 PM   Result Value Ref Range    Bilirubin, Total -  4.1 0.1 - 6.0 mg/dL       Immunization History   Administered Date(s) Administered    Hepatitis B, Pediatric/Adolescent 2017       Nursery Course (synopsis of major diagnoses, care, treatment, and services provided during the course of the hospital stay): Uncomplicated.  L supernumerary digit ligated as below.  Found to be co-sleeping multiple times throughout admission.  Discussed risk of SIDS with co-sleeping and additional blankets.  Reviewed safe sleep environment multiple times.      [place] Screen sent greater than 24 hours?: yes  Hearing Screen Right Ear: passed    Left Ear: passed   Stooling: Yes  Voiding: Yes  SpO2: Pre-Ductal (Right Hand): 99 %  SpO2: Post-Ductal: 100 %  Car Seat Test?    Therapeutic Interventions: none  Surgical Procedures: circumcision    Discharge Exam:   Discharge Weight: Weight: 3.06 kg (6 lb 11.9 oz)  Weight Change Since Birth: -8%     Physical Exam   Constitutional: He appears well-developed and well-nourished. He is active. No distress.   HENT:   Head: Anterior fontanelle is flat. No cranial deformity.   Nose: Nose normal.   Mouth/Throat: Mucous membranes are moist. No cleft palate. Oropharynx is clear.   Eyes: Red reflex is present bilaterally. Pupils are equal, round, and reactive to light. "   Neck: Normal range of motion. No crepitus.   Cardiovascular: Normal rate, regular rhythm, S1 normal and S2 normal.  Pulses are palpable.    No murmur heard.  Pulses:       Femoral pulses are 2+ on the right side, and 2+ on the left side.  Pulmonary/Chest: Effort normal and breath sounds normal. He has no wheezes. He has no rhonchi. He has no rales.   Abdominal: Soft. Bowel sounds are normal. He exhibits no distension and no mass. There is no hepatosplenomegaly.   Genitourinary: Testes normal and penis normal.   Genitourinary Comments: Scooter 1   Musculoskeletal: Normal range of motion.   Negative Ortolani/Jamison, no mariela or dimples; L supernumerary digit sutured at base with absent capillary refill   Neurological: He is alert.   Skin: Skin is warm. Capillary refill takes less than 3 seconds. No rash noted. No jaundice.       Assessment and Plan:     Discharge Date and Time: No discharge date for patient encounter.    Final Diagnoses:   Final Active Diagnoses:    Diagnosis Date Noted POA    Routine or ritual circumcision [Z41.2] 2017 Not Applicable    Single liveborn, born in hospital, delivered by  section [Z38.01] 2017 Yes    Supernumerary digit [Q69.9] 2017 Yes      Problems Resolved During this Admission:    Diagnosis Date Noted Date Resolved POA     Procedure note:  Discussed supernumerary digit ligation.  Mother aware of possibility of residual tissue and requests procedure.  L supernumerary digit ligated with 4-0 silk suture, with absent capillary refill afterwards.  Patient tolerated well with no complications.  Mother counseled on expected amputation and potential complications.      Discharged Condition: Good    Disposition: Discharge to Home    Follow Up:  Follow-up Information     Follow up with Fox Kim MD. Schedule an appointment as soon as possible for a visit in 3 days.    Specialty:  Pediatrics    Contact information:    [address] [place]  [address]  [phone]          Patient Instructions:   No discharge procedures on file.  Medications:  Reconciled Home Medications: There are no discharge medications for this patient.      Special Instructions: counseled extensively on safe sleep environment    Fox Kim MD  Pediatrics  [facility]     [place]

## 2024-02-13 NOTE — H&P ADULT - PROBLEM SELECTOR PLAN 1
s/p Lasix 40 mg in the ED  Likely secondary to increase water intake at home   NYHA Class IV  Will continue Lasix 40 mg IV qd w/ parameters for now since BP has been running soft.  Monitor electrolytes. EKG showed Sinus Tachycardia    Elevated BNP. Kidney failure, older age, and female sex all increase BNP. Obesity reduces BNP.   Recent TTE noted in 1/2024 noted. Will hold TTE until cardio provides recommendations.   Remote Tele. Daily Weights, Strict I & Os. Vitals q4hr   DASH/TLC diet.1500 mL Fluid restriction    Wean off Oxygen as tolerated    Cardio  ____ consulted    Avoid NSAIDs or thiazolidinediones. Dietitian consult s/p Lasix 40 mg in the ED  Likely secondary to increase water intake at home   NYHA Class IV  Will continue Lasix 40 mg IV qd w/ parameters for now since BP has been running soft.  Monitor electrolytes. EKG showed Sinus Tachycardia    Elevated BNP. Kidney failure, older age, and female sex all increase BNP. Obesity reduces BNP.   Recent TTE noted in 1/2024 noted. Will hold TTE until cardio provides recommendations.   Remote Tele. Daily Weights, Strict I & Os. Vitals q4hr   DASH/TLC diet.1500 mL Fluid restriction    Wean off Oxygen as tolerated    Cardio Dr Lynne consulted    Avoid NSAIDs or thiazolidinediones. Dietitian consult

## 2024-02-13 NOTE — REVIEW OF SYSTEMS
[Fatigue] : fatigue [Lower Ext Edema] : lower extremity edema [Orthopnea] : orthopnea [Dyspnea on Exertion] : dyspnea on exertion [Muscle Weakness] : muscle weakness [Memory Loss] : memory loss [Unsteady Walking] : ataxia [Easy Bruising] : easy bruising [Negative] : Psychiatric [Fever] : no fever [Chills] : no chills [Hot Flashes] : no hot flashes [Night Sweats] : no night sweats [Recent Change In Weight] : ~T no recent weight change [Chest Pain] : no chest pain [Palpitations] : no palpitations [Shortness Of Breath] : no shortness of breath [Wheezing] : no wheezing [Cough] : no cough [Headache] : no headache [Dizziness] : no dizziness [Fainting] : no fainting [Confusion] : no confusion [Easy Bleeding] : no easy bleeding [Swollen Glands] : no swollen glands [FreeTextEntry4] : steri strips to nose, post fall  [FreeTextEntry6] : uses oxygen 3L NC [de-identified] : ecchymosis to BLUE [de-identified] : being treated for depressions, followed by  provider
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
## 2024-02-13 NOTE — CURRENT MEDS
[Lack of understanding] : lack of understanding [Yes] : Reviewed medication list for presence of high-risk medications. [Takes medication as prescribed] : does not take [FreeTextEntry1] : Pt has been educated on the importance of using nebulizer medication for COPD. She states nebulizer is in the closet her  knows where it is, has not used it.
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
## 2024-02-13 NOTE — H&P ADULT - PROBLEM SELECTOR PLAN 5
Not in exacerbation  Patient uses 2 to 3L NC  Continue Oxygen Therapy   Will continue Albuterol   Will continue Spiriva   Rest as above
no

## 2024-02-13 NOTE — ED ADULT TRIAGE NOTE - CHIEF COMPLAINT QUOTE
She has pain and swelling to both LE.  Patient states she fell on 02/11/2024 and was evaluated and treated in another hospital for the fall.

## 2024-02-13 NOTE — ED ADULT NURSE NOTE - OBJECTIVE STATEMENT
Patient present to the ED from home with complaint of lower extremity swelling. Patient is covered in diarrheal stool. Patient hygienic needs attended to by RN. Bed linen changed, chux placed under patient. Obese ill-looking patient in bed. Patient AAOx2. Utilizing 2L O2 via NC. Mild respiratory distress noted. Chest expansion symmetric. Lungs diminished. IV attempt to left AC missed, 1st set of blood culture and labs drawn and sent. 4+ edema noted to bilateral lower extremities. Right leg has an open wound/skin tear that is oozing blood tinged fluid. Excoriation noted to buttock from incontinence. Patient noted to have bruising on the right breast and arms. Skin and lips dry.

## 2024-02-13 NOTE — PHYSICAL EXAM
[No Acute Distress] : no acute distress [Well Developed] : well developed [Well-Appearing] : well-appearing [No Respiratory Distress] : no respiratory distress  [No Accessory Muscle Use] : no accessory muscle use [Normal Rate] : normal rate  [Soft] : abdomen soft [Non Tender] : non-tender [Normal Bowel Sounds] : normal bowel sounds [Kyphosis] : kyphosis [Normal Affect] : the affect was normal [Alert and Oriented x3] : oriented to person, place, and time [Normal Mood] : the mood was normal [de-identified] : obese [de-identified] : steri strips to nose bridge s/p fall [de-identified] : supplemental oxygen 3L NC, grossly CTA poor effort [de-identified] : BLE 3+ Edema and lymphedema [de-identified] : multiple scattered ecchymosis to BLUE, secondary to phlebotomy during admission [de-identified] : limited ambulation, uses wheelchair and transfers

## 2024-02-13 NOTE — H&P ADULT - PROBLEM SELECTOR PLAN 4
Patient uses 2 to 3L NC  Continue Oxygen Therapy   Will continue Albuterol   Will continue Spiriva   Rest as above

## 2024-02-13 NOTE — ED ADULT NURSE REASSESSMENT NOTE - NS ED NURSE REASSESS COMMENT FT1
Pt ultrasound guided IV in the RAC infiltrated. Pt need New IV line.
Pt received in bed. Pt alert and oriented x 2. Pt is obese. Pt on 3 liters nasal canula. It has bilateral leg swelling and edema. +2 pitting edema bilaterally.

## 2024-02-13 NOTE — HEALTH RISK ASSESSMENT
[No] : In the past 12 months have you used drugs other than those required for medical reasons? No [Any fall with injury in past year] : Patient reported fall with injury in the past year [Assistive Device] : Patient uses an assistive device [Little interest or pleasure doing things] : 1) Little interest or pleasure doing things [Feeling down, depressed, or hopeless] : 2) Feeling down, depressed, or hopeless [0] : 2) Feeling down, depressed, or hopeless: Not at all (0) [PHQ-2 Negative - No further assessment needed] : PHQ-2 Negative - No further assessment needed [Low Salt Diet] : low salt [General Adherence] : general adherence [None] : None [With Family] : lives with family [# of Members in Household ___] :  household currently consist of [unfilled] member(s) [] :  [Independent] : using telephone [Some assistance needed] : managing finances [Full assistance needed] : using transportation [Designated Healthcare Proxy] : Designated healthcare proxy [Relationship: ___] : Relationship: [unfilled] [Aggressive treatment] : aggressive treatment [I will adhere to the patient's wishes.] : I will adhere to the patient's wishes. [Time Spent: ___ minutes] : Time Spent: [unfilled] minutes [Never] : Never [de-identified] : fell on 2/7/24 resulting in injury to nose, steri strips in place [de-identified] : wheel chair and rollator [[id number]] : 0 [de-identified] : pt denies states sometimes she is forgetful [de-identified] : spouse and son [AdvancecareDate] : 02/24

## 2024-02-13 NOTE — H&P ADULT - PROBLEM SELECTOR PLAN 3
EKG showing Sinus Tachycardia  Elevated Troponin   No chest pain  Patient on HF at this time  Most likely ischemic demand in the setting of CHF  F/U Repeat Cardiac Enzymes and EKG

## 2024-02-13 NOTE — ED ADULT NURSE NOTE - BREATH SOUNDS, RIGHT
[FreeTextEntry1] : - Discussed risks/ benefits, goals of care and possible side effects of botox injection. Patient would like to try injection- will inject left FFs, WFs, and EFs once approved by insurance.\par - Continue PT/OT\par - Discussed with patient functional prognosis\par - Will f/u when botox approved. 
diminished

## 2024-02-13 NOTE — PLAN
[FreeTextEntry1] : follow up with Dr. Johns as scheduled take all medications as prescribed  fall precautions

## 2024-02-13 NOTE — CONSULT NOTE ADULT - ASSESSMENT
Patient is a 74yo Obese Female wheelchair bound with COPD on 3 L NC at home, hypothyroid, diastolic CHF on Lasix, HTN on Losartan, breast cancer s/p surgery, asthma, chronic lymphedema, Left kidney cancer  s/p partial nephrectomy, p/w SOB and worsening LE edema. Pt a/w acute on chronic diastolic HF and ALDAIR. Nephrology consulted for Elevated serum creatinine.    1. ALDAIR- last SCr 0.66  2. Essential HTN  3. LE edema  4.       [facility] NEPHROLOGY  Hernandez Forbes M.D.  Damion Dumont D.O.  Sydney Emanuel M.D.  MD Yeimy Tello, MSN, ANP-C    Telephone: [phone]  Facsimile: [phone] [place] [address], [address]  [place] [id number]   Patient is a 72yo Obese Female wheelchair bound with COPD on 3 L NC at home, hypothyroid, diastolic CHF on Lasix, HTN on Losartan, breast cancer s/p surgery, asthma, chronic lymphedema, Left kidney cancer  s/p partial nephrectomy, p/w SOB and worsening LE edema. Pt a/w acute on chronic diastolic HF and ALDAIR. Nephrology consulted for Elevated serum creatinine.    1. ALDAIR- last SCr 0.6-0.7. ALDAIR likely hemodynamically mediated in the setting of hypotension with acute on chronic dHF. Check UA and FeUrea. Check Renal US. Strict I/Os. Avoid nephrotoxins/ NSAIDs/ RCA. Monitor BMP.  2. Essential HTN- BP low normal. Continue to hold antihypertensive medications. Monitor BP.   3. LE edema- recc Lasix 80mg IV daily. Daily weights. Strict I/Os. Recc dopplers to r/o DVT  4. Acute on chronic Diastolic HF- plan as per Cards/ primary team      [facility] NEPHROLOGY  Hernandez Forbes M.D.  Damion Dumont D.O.  Sydney Emanuel M.D.  MD Yeimy Tello, MSN, ANP-C    Telephone: [phone]  Facsimile: [phone] [place] [address], [address]  [place] [address]

## 2024-02-13 NOTE — H&P ADULT - NSHPPHYSICALEXAM_GEN_ALL_CORE
PHYSICAL EXAMINATION:  GENERAL: NAD, sitting comfortable in bed  HEAD:  Atraumatic, Normocephalic  EYES:  Conjunctiva and sclera clear, pupils are equal, round, and reactive to light and accommodation.  NECK: Supple, No JVD, trachea is midline, no evidence of thyroid enlargement, no lymphadenopathy or tenderness.  CHEST/LUNG: Mild crackles noted bilaterally, no wheezing, or rubs  HEART: Regular rate and rhythm; No murmurs, rubs, or gallops  ABDOMEN: Soft, Nontender, obese, Nondistended; Bowel sounds present  NERVOUS SYSTEM:  Alert & Oriented X3; No obvious focal sensory or motor deficits are noted; Recent and remote memory is intact; Appropriate mood and affect.  EXTREMITIES:  2+ Peripheral Pulses, No clubbing, cyanosis, lymphedema on lower extremity bilaterally noted  SKIN: Warm, dry, and well perfused; Good turgor; No lesions, nodules or rashes are noted.     Vital Signs Last 24 Hrs  T(C): 36.4 (13 Feb 2024 16:41), Max: 36.7 (13 Feb 2024 03:48)  T(F): 97.6 (13 Feb 2024 16:41), Max: 98.1 (13 Feb 2024 03:48)  HR: 106 (13 Feb 2024 16:41) (96 - 116)  BP: 111/58 (13 Feb 2024 16:41) (82/53 - 151/93)  BP(mean): --  RR: 24 (13 Feb 2024 16:41) (17 - 24)  SpO2: 95% (13 Feb 2024 16:41) (92% - 100%)    Parameters below as of 13 Feb 2024 16:41  Patient On (Oxygen Delivery Method): nasal cannula  O2 Flow (L/min): 3 vitals reviewed in EMR    PHYSICAL EXAMINATION:  GENERAL: NAD, sitting comfortable in bed  HEAD:  Atraumatic, Normocephalic  EYES:  Conjunctiva and sclera clear, pupils are equal, round, and reactive to light and accommodation.  NECK: Supple, No JVD, trachea is midline, no evidence of thyroid enlargement, no lymphadenopathy or tenderness.  CHEST/LUNG: Mild crackles noted bilaterally, no wheezing, or rubs  HEART: Regular rate and rhythm; No murmurs, rubs, or gallops  ABDOMEN: Soft, Nontender, obese, Nondistended; Bowel sounds present  NERVOUS SYSTEM:  Alert & Oriented X3; No obvious focal sensory or motor deficits are noted; Recent and remote memory is intact; Appropriate mood and affect.  EXTREMITIES:  2+ Peripheral Pulses, No clubbing, cyanosis, lymphedema on lower extremity bilaterally noted  SKIN: Warm, dry, and well perfused; Good turgor; No lesions, nodules or rashes are noted.     Vital Signs Last 24 Hrs  T(C): 36.4 (13 Feb 2024 16:41), Max: 36.7 (13 Feb 2024 03:48)  T(F): 97.6 (13 Feb 2024 16:41), Max: 98.1 (13 Feb 2024 03:48)  HR: 106 (13 Feb 2024 16:41) (96 - 116)  BP: 111/58 (13 Feb 2024 16:41) (82/53 - 151/93)  BP(mean): --  RR: 24 (13 Feb 2024 16:41) (17 - 24)  SpO2: 95% (13 Feb 2024 16:41) (92% - 100%)    Parameters below as of 13 Feb 2024 16:41  Patient On (Oxygen Delivery Method): nasal cannula  O2 Flow (L/min): 3

## 2024-02-13 NOTE — COUNSELING
[Fall prevention counseling provided] : Fall prevention counseling provided [No throw rugs] : No throw rugs [Adequate lighting] : Adequate lighting [Use recommended devices] : Use recommended devices [Use proper foot wear] : Use proper foot wear [FreeTextEntry1] : wheel chair, A walker

## 2024-02-13 NOTE — ED PROVIDER NOTE - CLINICAL SUMMARY MEDICAL DECISION MAKING FREE TEXT BOX
72 yo F PMH COPD on 3 L NC at home, hypothyroid, diastolic CHF on Lasix, breast cancer s/p surgery, asthma, chronic lymphedema, kidney cancer  s/p partial nephrectomy, wheelchair-bound, obesity presenting With bilateral lower extremity pain, chest pain and sob. +ecchymosis noted to chest and b/l upper extremities. Sensation intact to b/l lower extremities. Tachy 112 HR, pt sating well on home 3L O2 @ 94. CTAB, will give duoneb for increased RR. CT imaging ordered of the head, c-spine, chest given ecchyomsis to R lateral chest. Abdomen soft, NTND. XR imaging ordered of pelvis and b/l lower extremities. Lower ext symmetrically warm, palpable pulses b/l, low suspicion for copmartment syndrome or limb ischemia. Cardiac workup ordered as well.

## 2024-02-13 NOTE — H&P ADULT - ASSESSMENT
A 73 year old female, from home, wheelchair-bound, w/ PMHx COPD on 3 L NC at home, Obesity, Hypothyroidism, Diastolic HF on Lasix, Breast cancer s/p surgery, Asthma, Chronic lymphedema, Kidney cancer s/p partial nephrectomy, presented to the ED complaining of rapidly progressive dyspnea at rest that started 24 to 48 hours ago. Since then, she has developed significant orthopnea, abdominal girth, and leg swelling from her baseline. Admitted to telemetry for Acute on Chronic Diastolic HF.       ***MORNING TEAM TO CONFIRM MEDREC***

## 2024-02-13 NOTE — H&P ADULT - PROBLEM SELECTOR PLAN 2
Noted to have WBC 15K to 19K in early Feb 2024. Tachycardic and Tachypneic today. Likely in the setting of CHF.   WBC 17K likely reactive  There are some mild congestive findings at this time new since January 29 on CXR  Pending Full RVP and UA  F/U Blood cultures  Will hold ABX for now

## 2024-02-13 NOTE — HISTORY OF PRESENT ILLNESS
[Post-hospitalization from ___ Hospital] : Post-hospitalization from [unfilled] Hospital [Admitted on: ___] : The patient was admitted on [unfilled] [Discharged on ___] : discharged on [unfilled] [Discharge Summary] : discharge summary [Discharge Med List] : discharge medication list [Other: ____] : [unfilled] [Med Reconciliation] : medication reconciliation has been completed [Patient Contacted By: ____] : and contacted by [unfilled] [FreeTextEntry2] : 73y F PMH COPD on 3 L NC at home, hypothyroid, diastolic CHF on Lasix, breast cancer s/p surgery, asthma, chronic lymphedema, kidney cancer  s/p partial nephrectomy, wheelchair-bound, obesity presenting for cough and diarrhea.  Patient states that she had diarrhea about 3 episodes per day for the past 2 days and has had continued dry cough for the past month.  Patient was recently admitted from January 13 to January 20 for SOB admitted with human metapneumovirus and acute COPD exacerbation.  Patient states that a day after she went home she started feeling sick again.  Denies fever, chest pain, shortness of breath, abdominal pain, nausea, vomiting, dysuria, constipation.  Endorses poor appetite  decreased p.o. intake.  Feels weak is now unable to transfer from her wheelchair to the bed as easily as before.   ROS: Denies CP, SOB, palpitation, N/V, fever, chills, dizziness, abm pain, recent travel, change in urinary habits   A 10-system ROS was performed and is negative except as noted above and/or in the HPI.  (29 Jan 2024 22:28)  Hospital Course: Patient was admitted for further medical management.  Diarrhea now resolved. Labwork was notable for ALDAIR and leukocytosis >> improved. No other Infectious symptoms reported, patient tolerating diet, No fever. Lasix and losartan were resumed on discharge as per cardiology recs and ALDAIR resolved. Discharge/Dispo/Med rec discussed with attending Dr. Welch Patient medically cleared for discharge.   Important Medication Changes and Reason:   73y F pt seen today for post hospital discharge. She was admitted from 1/29-2/2 for diarrhea and COPD, treated in another hospital for fall on 2/7/24.  Pt was admitted in Jan for human metapneumovirus and COPD exacerbation, at last home visit, nebulizer and duo neb ordered. Pt has duo/neb medication but did not  nebulizer and states hers is in the closet. She is not using nebulizer treatments at this time. Oxygen 3L NC in place,  has appt with Dr. Johns next week. She is no longer able to ambulate and transfers from w/c to bed. She has new Rx for Pen VK 500mg PO BID x 6 months.  Pt has HHA from [facility] plan for healthy living. Medication review done, states she is taking all of her medications as prescribed. Reviewed TCM and yellow card with patient.

## 2024-02-14 DIAGNOSIS — A41.9 SEPSIS, UNSPECIFIED ORGANISM: ICD-10-CM

## 2024-02-14 LAB
A1C WITH ESTIMATED AVERAGE GLUCOSE RESULT: 6.4 % — HIGH (ref 4–5.6)
ALBUMIN SERPL ELPH-MCNC: 1.9 G/DL — LOW (ref 3.5–5)
ALBUMIN SERPL ELPH-MCNC: 2.2 G/DL — LOW (ref 3.5–5)
ALP SERPL-CCNC: 107 U/L — SIGNIFICANT CHANGE UP (ref 40–120)
ALP SERPL-CCNC: 108 U/L — SIGNIFICANT CHANGE UP (ref 40–120)
ALT FLD-CCNC: 26 U/L DA — SIGNIFICANT CHANGE UP (ref 10–60)
ALT FLD-CCNC: 28 U/L DA — SIGNIFICANT CHANGE UP (ref 10–60)
ANION GAP SERPL CALC-SCNC: 13 MMOL/L — SIGNIFICANT CHANGE UP (ref 5–17)
ANION GAP SERPL CALC-SCNC: 9 MMOL/L — SIGNIFICANT CHANGE UP (ref 5–17)
APTT BLD: 29.7 SEC — SIGNIFICANT CHANGE UP (ref 24.5–35.6)
AST SERPL-CCNC: 34 U/L — SIGNIFICANT CHANGE UP (ref 10–40)
AST SERPL-CCNC: 45 U/L — HIGH (ref 10–40)
BASOPHILS # BLD AUTO: 0.04 K/UL — SIGNIFICANT CHANGE UP (ref 0–0.2)
BASOPHILS NFR BLD AUTO: 0.3 % — SIGNIFICANT CHANGE UP (ref 0–2)
BILIRUB SERPL-MCNC: 0.7 MG/DL — SIGNIFICANT CHANGE UP (ref 0.2–1.2)
BILIRUB SERPL-MCNC: 0.7 MG/DL — SIGNIFICANT CHANGE UP (ref 0.2–1.2)
BUN SERPL-MCNC: 103 MG/DL — HIGH (ref 7–18)
BUN SERPL-MCNC: 89 MG/DL — HIGH (ref 7–18)
CALCIUM SERPL-MCNC: 8.9 MG/DL — SIGNIFICANT CHANGE UP (ref 8.4–10.5)
CALCIUM SERPL-MCNC: 9.2 MG/DL — SIGNIFICANT CHANGE UP (ref 8.4–10.5)
CHLORIDE SERPL-SCNC: 105 MMOL/L — SIGNIFICANT CHANGE UP (ref 96–108)
CHLORIDE SERPL-SCNC: 107 MMOL/L — SIGNIFICANT CHANGE UP (ref 96–108)
CHOLEST SERPL-MCNC: 127 MG/DL — SIGNIFICANT CHANGE UP
CO2 SERPL-SCNC: 18 MMOL/L — LOW (ref 22–31)
CO2 SERPL-SCNC: 22 MMOL/L — SIGNIFICANT CHANGE UP (ref 22–31)
CREAT SERPL-MCNC: 1.46 MG/DL — HIGH (ref 0.5–1.3)
CREAT SERPL-MCNC: 2.28 MG/DL — HIGH (ref 0.5–1.3)
EGFR: 22 ML/MIN/1.73M2 — LOW
EGFR: 37 ML/MIN/1.73M2 — LOW
EOSINOPHIL # BLD AUTO: 0.24 K/UL — SIGNIFICANT CHANGE UP (ref 0–0.5)
EOSINOPHIL NFR BLD AUTO: 1.7 % — SIGNIFICANT CHANGE UP (ref 0–6)
ESTIMATED AVERAGE GLUCOSE: 137 MG/DL — HIGH (ref 68–114)
GLUCOSE BLDC GLUCOMTR-MCNC: 134 MG/DL — HIGH (ref 70–99)
GLUCOSE SERPL-MCNC: 112 MG/DL — HIGH (ref 70–99)
GLUCOSE SERPL-MCNC: 96 MG/DL — SIGNIFICANT CHANGE UP (ref 70–99)
HCT VFR BLD CALC: 27.5 % — LOW (ref 34.5–45)
HCT VFR BLD CALC: 29.2 % — LOW (ref 34.5–45)
HDLC SERPL-MCNC: 34 MG/DL — LOW
HGB BLD-MCNC: 8.7 G/DL — LOW (ref 11.5–15.5)
HGB BLD-MCNC: 9.2 G/DL — LOW (ref 11.5–15.5)
IMM GRANULOCYTES NFR BLD AUTO: 0.6 % — SIGNIFICANT CHANGE UP (ref 0–0.9)
INR BLD: 1.16 RATIO — SIGNIFICANT CHANGE UP (ref 0.85–1.18)
LIPID PNL WITH DIRECT LDL SERPL: 61 MG/DL — SIGNIFICANT CHANGE UP
LYMPHOCYTES # BLD AUTO: 0.71 K/UL — LOW (ref 1–3.3)
LYMPHOCYTES # BLD AUTO: 5 % — LOW (ref 13–44)
MAGNESIUM SERPL-MCNC: 1.5 MG/DL — LOW (ref 1.6–2.6)
MAGNESIUM SERPL-MCNC: 1.8 MG/DL — SIGNIFICANT CHANGE UP (ref 1.6–2.6)
MCHC RBC-ENTMCNC: 30.2 PG — SIGNIFICANT CHANGE UP (ref 27–34)
MCHC RBC-ENTMCNC: 30.2 PG — SIGNIFICANT CHANGE UP (ref 27–34)
MCHC RBC-ENTMCNC: 31.5 GM/DL — LOW (ref 32–36)
MCHC RBC-ENTMCNC: 31.6 GM/DL — LOW (ref 32–36)
MCV RBC AUTO: 95.5 FL — SIGNIFICANT CHANGE UP (ref 80–100)
MCV RBC AUTO: 95.7 FL — SIGNIFICANT CHANGE UP (ref 80–100)
MONOCYTES # BLD AUTO: 1.56 K/UL — HIGH (ref 0–0.9)
MONOCYTES NFR BLD AUTO: 11 % — SIGNIFICANT CHANGE UP (ref 2–14)
NEUTROPHILS # BLD AUTO: 11.59 K/UL — HIGH (ref 1.8–7.4)
NEUTROPHILS NFR BLD AUTO: 81.4 % — HIGH (ref 43–77)
NON HDL CHOLESTEROL: 93 MG/DL — SIGNIFICANT CHANGE UP
NRBC # BLD: 0 /100 WBCS — SIGNIFICANT CHANGE UP (ref 0–0)
NRBC # BLD: 0 /100 WBCS — SIGNIFICANT CHANGE UP (ref 0–0)
PHOSPHATE SERPL-MCNC: 4 MG/DL — SIGNIFICANT CHANGE UP (ref 2.5–4.5)
PHOSPHATE SERPL-MCNC: 5.7 MG/DL — HIGH (ref 2.5–4.5)
PLATELET # BLD AUTO: 155 K/UL — SIGNIFICANT CHANGE UP (ref 150–400)
PLATELET # BLD AUTO: 181 K/UL — SIGNIFICANT CHANGE UP (ref 150–400)
POTASSIUM SERPL-MCNC: 3.4 MMOL/L — LOW (ref 3.5–5.3)
POTASSIUM SERPL-MCNC: 4.8 MMOL/L — SIGNIFICANT CHANGE UP (ref 3.5–5.3)
POTASSIUM SERPL-SCNC: 3.4 MMOL/L — LOW (ref 3.5–5.3)
POTASSIUM SERPL-SCNC: 4.8 MMOL/L — SIGNIFICANT CHANGE UP (ref 3.5–5.3)
PROT SERPL-MCNC: 5.9 G/DL — LOW (ref 6–8.3)
PROT SERPL-MCNC: 6.4 G/DL — SIGNIFICANT CHANGE UP (ref 6–8.3)
PROTHROM AB SERPL-ACNC: 13.2 SEC — HIGH (ref 9.5–13)
RBC # BLD: 2.88 M/UL — LOW (ref 3.8–5.2)
RBC # BLD: 3.05 M/UL — LOW (ref 3.8–5.2)
RBC # FLD: 16.1 % — HIGH (ref 10.3–14.5)
RBC # FLD: 16.2 % — HIGH (ref 10.3–14.5)
SODIUM SERPL-SCNC: 136 MMOL/L — SIGNIFICANT CHANGE UP (ref 135–145)
SODIUM SERPL-SCNC: 138 MMOL/L — SIGNIFICANT CHANGE UP (ref 135–145)
TRIGL SERPL-MCNC: 161 MG/DL — HIGH
TROPONIN I, HIGH SENSITIVITY RESULT: 117.4 NG/L — HIGH
TROPONIN I, HIGH SENSITIVITY RESULT: 91.3 NG/L — HIGH
UUN UR-MCNC: 367 MG/DL — SIGNIFICANT CHANGE UP
WBC # BLD: 13.31 K/UL — HIGH (ref 3.8–10.5)
WBC # BLD: 14.23 K/UL — HIGH (ref 3.8–10.5)
WBC # FLD AUTO: 13.31 K/UL — HIGH (ref 3.8–10.5)
WBC # FLD AUTO: 14.23 K/UL — HIGH (ref 3.8–10.5)

## 2024-02-14 PROCEDURE — 93970 EXTREMITY STUDY: CPT | Mod: 26

## 2024-02-14 PROCEDURE — 76775 US EXAM ABDO BACK WALL LIM: CPT | Mod: 26

## 2024-02-14 RX ORDER — ACETAMINOPHEN 500 MG
1000 TABLET ORAL ONCE
Refills: 0 | Status: COMPLETED | OUTPATIENT
Start: 2024-02-14 | End: 2024-02-14

## 2024-02-14 RX ORDER — ALBUMIN HUMAN 25 %
50 VIAL (ML) INTRAVENOUS ONCE
Refills: 0 | Status: COMPLETED | OUTPATIENT
Start: 2024-02-14 | End: 2024-02-14

## 2024-02-14 RX ORDER — SODIUM CHLORIDE 9 MG/ML
250 INJECTION, SOLUTION INTRAVENOUS ONCE
Refills: 0 | Status: COMPLETED | OUTPATIENT
Start: 2024-02-14 | End: 2024-02-14

## 2024-02-14 RX ORDER — MAGNESIUM SULFATE 500 MG/ML
1 VIAL (ML) INJECTION ONCE
Refills: 0 | Status: COMPLETED | OUTPATIENT
Start: 2024-02-14 | End: 2024-02-14

## 2024-02-14 RX ORDER — MEROPENEM 1 G/30ML
INJECTION INTRAVENOUS
Refills: 0 | Status: DISCONTINUED | OUTPATIENT
Start: 2024-02-14 | End: 2024-02-16

## 2024-02-14 RX ORDER — POTASSIUM CHLORIDE 20 MEQ
10 PACKET (EA) ORAL ONCE
Refills: 0 | Status: COMPLETED | OUTPATIENT
Start: 2024-02-14 | End: 2024-02-14

## 2024-02-14 RX ORDER — SODIUM CHLORIDE 9 MG/ML
250 INJECTION, SOLUTION INTRAVENOUS ONCE
Refills: 0 | Status: DISCONTINUED | OUTPATIENT
Start: 2024-02-14 | End: 2024-02-14

## 2024-02-14 RX ORDER — MEROPENEM 1 G/30ML
500 INJECTION INTRAVENOUS ONCE
Refills: 0 | Status: COMPLETED | OUTPATIENT
Start: 2024-02-14 | End: 2024-02-14

## 2024-02-14 RX ORDER — CEFTRIAXONE 500 MG/1
1000 INJECTION, POWDER, FOR SOLUTION INTRAMUSCULAR; INTRAVENOUS EVERY 24 HOURS
Refills: 0 | Status: DISCONTINUED | OUTPATIENT
Start: 2024-02-14 | End: 2024-02-14

## 2024-02-14 RX ORDER — INFLUENZA VIRUS VACCINE 15; 15; 15; 15 UG/.5ML; UG/.5ML; UG/.5ML; UG/.5ML
0.7 SUSPENSION INTRAMUSCULAR ONCE
Refills: 0 | Status: DISCONTINUED | OUTPATIENT
Start: 2024-02-14 | End: 2024-02-18

## 2024-02-14 RX ORDER — FUROSEMIDE 40 MG
20 TABLET ORAL
Refills: 0 | Status: DISCONTINUED | OUTPATIENT
Start: 2024-02-14 | End: 2024-02-18

## 2024-02-14 RX ORDER — MEROPENEM 1 G/30ML
500 INJECTION INTRAVENOUS EVERY 12 HOURS
Refills: 0 | Status: DISCONTINUED | OUTPATIENT
Start: 2024-02-14 | End: 2024-02-16

## 2024-02-14 RX ORDER — FUROSEMIDE 40 MG
40 TABLET ORAL ONCE
Refills: 0 | Status: COMPLETED | OUTPATIENT
Start: 2024-02-14 | End: 2024-02-14

## 2024-02-14 RX ADMIN — GABAPENTIN 100 MILLIGRAM(S): 400 CAPSULE ORAL at 18:06

## 2024-02-14 RX ADMIN — MEROPENEM 100 MILLIGRAM(S): 1 INJECTION INTRAVENOUS at 18:54

## 2024-02-14 RX ADMIN — MEROPENEM 100 MILLIGRAM(S): 1 INJECTION INTRAVENOUS at 04:16

## 2024-02-14 RX ADMIN — Medication 50 MILLILITER(S): at 02:03

## 2024-02-14 RX ADMIN — Medication 81 MILLIGRAM(S): at 12:30

## 2024-02-14 RX ADMIN — HEPARIN SODIUM 5000 UNIT(S): 5000 INJECTION INTRAVENOUS; SUBCUTANEOUS at 05:53

## 2024-02-14 RX ADMIN — SODIUM CHLORIDE 250 MILLILITER(S): 9 INJECTION, SOLUTION INTRAVENOUS at 04:17

## 2024-02-14 RX ADMIN — HEPARIN SODIUM 5000 UNIT(S): 5000 INJECTION INTRAVENOUS; SUBCUTANEOUS at 18:07

## 2024-02-14 RX ADMIN — GABAPENTIN 100 MILLIGRAM(S): 400 CAPSULE ORAL at 05:54

## 2024-02-14 RX ADMIN — Medication 400 MILLIGRAM(S): at 01:40

## 2024-02-14 RX ADMIN — Medication 500 MILLIGRAM(S): at 12:30

## 2024-02-14 RX ADMIN — Medication 10 MILLIGRAM(S): at 05:54

## 2024-02-14 RX ADMIN — Medication 1000 MILLIGRAM(S): at 02:00

## 2024-02-14 RX ADMIN — Medication 10 MILLIGRAM(S): at 18:06

## 2024-02-14 RX ADMIN — TIOTROPIUM BROMIDE 2 PUFF(S): 18 CAPSULE ORAL; RESPIRATORY (INHALATION) at 13:16

## 2024-02-14 RX ADMIN — DULOXETINE HYDROCHLORIDE 60 MILLIGRAM(S): 30 CAPSULE, DELAYED RELEASE ORAL at 13:16

## 2024-02-14 RX ADMIN — Medication 125 MICROGRAM(S): at 05:54

## 2024-02-14 RX ADMIN — Medication 1 TABLET(S): at 12:30
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
## 2024-02-14 NOTE — PROGRESS NOTE ADULT - SUBJECTIVE AND OBJECTIVE BOX
[facility] NEPHROLOGY- PROGRESS NOTE    Patient is a 72yo Obese Female wheelchair bound with COPD on 3 L NC at home, hypothyroid, diastolic CHF on Lasix, HTN on Losartan, breast cancer s/p surgery, asthma, chronic lymphedema, Left kidney cancer  s/p partial nephrectomy, p/w SOB and worsening LE edema. Pt a/w acute on chronic diastolic HF and ALDAIR. Nephrology consulted for Elevated serum creatinine.    Hospital Medications: Medications reviewed.  REVIEW OF SYSTEMS:  CONSTITUTIONAL: No fevers or chills  RESPIRATORY: No shortness of breath  CARDIOVASCULAR: No chest pain.  GASTROINTESTINAL: No nausea, vomiting, diarrhea or abdominal pain.   VASCULAR: No bilateral lower extremity edema.     VITALS:  T(F): 97.9 (02-14-24 @ 20:06), Max: 98.2 (02-14-24 @ 04:27)  HR: 86 (02-14-24 @ 20:06)  BP: 118/100 (02-14-24 @ 20:06)  RR: 19 (02-14-24 @ 20:06)  SpO2: 99% (02-14-24 @ 20:06)  Wt(kg): --  Height (cm): 157.5 (02-13 @ 03:48), 157.5 (02-07 @ 09:37), 157.5 (01-29 @ 16:51), 157.5 (01-22 @ 06:08)  Weight (kg): 90.7 (02-07 @ 09:37), 91.6 (01-29 @ 16:51), 106.3 (01-22 @ 06:08)  BMI (kg/m2): 36.6 (02-13 @ 03:48), 36.6 (02-07 @ 09:37), 36.9 (01-29 @ 16:51), 42.9 (01-22 @ 06:08)  BSA (m2): 1.91 (02-13 @ 03:48), 1.91 (02-07 @ 09:37), 1.92 (01-29 @ 16:51), 2.04 (01-22 @ 06:08)    PHYSICAL EXAM:  Gen: Obese  HEENT: anicteric  Neck: no JVD  Cards: RRR, +S1/S2, no M/G/R  Resp: B/L rhonchi  GI: soft, NT/ND, NABS  : no CVA tenderness  Extremities: + b/l lymphedema L>R.; improving  Derm: RLE open wound  Neuro: AO x1      LABS:  02-14    138  |  107  |  89<H>  ----------------------------<  96  3.4<L>   |  22  |  1.46<H>    Ca    8.9      14 Feb 2024 17:55  Phos  4.0     02-14  Mg     1.5     02-14    TPro  5.9<L>  /  Alb  1.9<L>  /  TBili  0.7  /  DBili      /  AST  34  /  ALT  28  /  AlkPhos  107  02-14    Creatinine Trend: 1.46 <--, 2.28 <--, 2.98 <--                        8.7    13.31 )-----------( 155      ( 14 Feb 2024 17:55 )             27.5     Urine Studies:  Urinalysis Basic - ( 14 Feb 2024 17:55 )    Color:  / Appearance:  / SG:  / pH:   Gluc: 96 mg/dL / Ketone:   / Bili:  / Urobili:    Blood:  / Protein:  / Nitrite:    Leuk Esterase:  / RBC:  / WBC    Sq Epi:  / Non Sq Epi:  / Bacteria:       Creatinine, Random Urine: 35 mg/dL (02-13 @ 21:32)    RADIOLOGY & ADDITIONAL STUDIES:    < from: US Renal (02.14.24 @ 11:34) >    ACC: [id number] EXAM:  US KIDNEY(S)   ORDERED BY: ELENA MOORE     PROCEDURE DATE:  02/14/2024          INTERPRETATION:  CLINICAL INFORMATION: Acute renal insufficiency.   Evaluate for urinary retention.    COMPARISON: None available.    TECHNIQUE: Sonography of the kidneys and bladder. The sonographer noted   that the evaluation was technically difficult related to patient body   habitus.    FINDINGS:  Right kidney: 11.4 cm. No hydronephrosis or calculi. 5.6 x 5.4 cm   septated cyst lower pole region.    Left kidney: 10.7 cm. No hydronephrosis or calculi. 3.7 x 3.0 cm cyst   lower pole region.    Urinary bladder: Visualized portions appear unremarkable.    IMPRESSION:  No evidence for bilateral hydronephrosis. Bilateral renal cysts.      < end of copied text >    < from: US Duplex Venous Lower Ext Complete, Bilateral (02.14.24 @ 09:25) >    ACC: [id number] EXAM:  US DPLX LWR EXT VEINS COMPL BI   ORDERED BY: ELENA MOORE     PROCEDURE DATE:  02/14/2024            < end of copied text >  < from: US Duplex Venous Lower Ext Complete, Bilateral (02.14.24 @ 09:25) >  IMPRESSION:    Very technically limited study with no evidence of deep venous thrombosis   in the visualized bilateral lower extremity veins.    --- End of Report ---      < end of copied text >
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
## 2024-02-14 NOTE — PROGRESS NOTE ADULT - ASSESSMENT
_________________________________________________________________________________________  ========>>  M E D I C A L   A T T E N D I N G    F O L L O W  U P  N O T E  <<=========  -----------------------------------------------------------------------------------------------------    - Patient seen and examined by me earlier today.   - In summary,  SHEKHAR VASQUEZ is a 73y year old woman admitted with   - Patient today overall doing ok, comfortable, eating OK.     ==================>> REVIEW OF SYSTEM <<=================    GEN: no fever, no chills, no pain  RESP: no SOB, no cough, no sputum  CVS: no chest pain, no palpitations  GI: no abdominal pain, no nausea, no constipation, no diarrhea  : no dysuria, no frequency  Neuro: no headache, no dizziness    ==================>> PHYSICAL EXAM <<=================    GEN: A&O X 3 , NAD , comfortable, pleasant, calm   HEENT: NCAT, PERRL, MMM, hearing intact  CVS: S1S2 , regular , No M/R/G appreciated  PULM: CTA B/L,  no W/R/R appreciated  ABD.: soft. non tender, non distended,  bowel sounds present  Extrem: intact pulses , no edema             ( Note written / Date of service 24 ( This is certified to be the same as "ENTERED" date above ( for billing purposes)))    ==================>> MEDICATIONS <<====================    MEDICATIONS  (STANDING):  aspirin enteric coated 81 milliGRAM(s) Oral daily  atorvastatin 40 milliGRAM(s) Oral at bedtime  busPIRone 10 milliGRAM(s) Oral two times a day  DULoxetine 60 milliGRAM(s) Oral daily  furosemide    Tablet 20 milliGRAM(s) Oral two times a day  gabapentin 100 milliGRAM(s) Oral two times a day  heparin   Injectable 5000 Unit(s) SubCutaneous every 12 hours  levothyroxine 125 MICROGram(s) Oral daily  melatonin 3 milliGRAM(s) Oral at bedtime  meropenem  IVPB 500 milliGRAM(s) IV Intermittent every 12 hours  meropenem  IVPB      multivitamin 1 Tablet(s) Oral daily  OLANZapine 10 milliGRAM(s) Oral at bedtime  propranolol 10 milliGRAM(s) Oral two times a day  tiotropium 2.5 MICROgram(s) Inhaler 2 Puff(s) Inhalation daily    MEDICATIONS  (PRN):  acetaminophen     Tablet .. 650 milliGRAM(s) Oral every 6 hours PRN Temp greater or equal to 38C (100.4F), Mild Pain (1 - 3)  albuterol    90 MICROgram(s) HFA Inhaler 2 Puff(s) Inhalation every 6 hours PRN Shortness of Breath and/or Wheezing    ___________  Active diet:  Diet, DASH/TLC:   Sodium & Cholesterol Restricted  1500mL Fluid Restriction ([id number])     Special Instructions for Nursin milliLiter(s) to 2000 milliLiter(s) fluid restriction  ___________________    ==================>> VITAL SIGNS <<==================    T(C): 36.4 (24 @ 16:15), Max: 36.8 (24 @ 04:27)  HR: 94 (24 @ 16:15) (72 - 121)  BP: 110/69 (24 @ 16:15) (82/50 - 135/80)  BP(mean): 86 (24 @ 05:05)  RR: 18 (24 @ 16:15) (18 - 22)  SpO2: 97% (24 @ 16:15) (90% - 100%)     CAPILLARY BLOOD GLUCOSE      POCT Blood Glucose.: 134 mg/dL (2024 03:47)    I&O's Summary       ==================>> LAB AND IMAGING <<==================                        9.2    14.23 )-----------( 181      ( 2024 01:48 )             29.2            136  |  105  |  103<H>  ----------------------------<  112<H>  4.8   |  18<L>  |  2.28<H>    Ca    9.2      2024 04:08  Phos  5.7       Mg     1.8         TPro  6.4  /  Alb  2.2<L>  /  TBili  0.7  /  DBili  x   /  AST  45<H>  /  ALT  26  /  AlkPhos  108  14    PT/INR - ( 2024 01:48 )   PT: 13.2 sec;   INR: 1.16 ratio         PTT - ( 2024 01:48 )  PTT:29.7 sec            CARDIAC MARKERS ( 2024 07:10 )  x     / x     / 722 U/L / x     / x           Urinalysis:  24 @ 21:32  Leuk. Est.: Moderate  Nirite: Negative  WBC: 25  Blood: Small     salt Crystal: --     calcium crystal: --     Bili: Negative     cast: --  color: Yellow  Bacteria: Many  Epith. cell: --  Yeast: --     Ketone: Negative     Protein: Trace     Glucose: Negative     sperm: --     Spec.Gravity: 1.011    TSH:      2.76   (24)           Lipid profile:  (24)     Total: 127     LDL  : (p)     HDL  :34     TG   :161     HgA1C:   (24)          (24)      6.4      < from: US Renal (24 @ 11:34) >  IMPRESSION:  No evidence for bilateral hydronephrosis. Bilateral renal cysts.    < end of copied text >  < from: US Duplex Venous Lower Ext Complete, Bilateral (24 @ 09:25) >  IMPRESSION:    Very technically limited study with no evidence of deep venous thrombosis   in the visualized bilateral lower extremity veins.    < end of copied text >  ___________________________________________________________________________________  ===============>>  A S S E S S M E N T   A N D   P L A N <<===============  ------------------------------------------------------------------------------------------    · Assessment	  A 73 year old female, from home, wheelchair-bound, w/ PMHx COPD on 3 L NC at home, Obesity, Hypothyroidism, Diastolic HF on Lasix, Breast cancer s/p surgery, Asthma, Chronic lymphedema, Kidney cancer s/p partial nephrectomy, presented to the ED complaining of rapidly progressive dyspnea at rest that started 24 to 48 hours ago. Since then, she has developed significant orthopnea, abdominal girth, and leg swelling from her baseline. Admitted to telemetry for Acute on Chronic Diastolic HF.         Problem/Plan - 1:  ·  Problem: Acute on chronic diastolic heart failure.   ·  Plan: s/p Lasix 40 mg in the ED  Likely secondary to increase water intake at home   NYHA Class IV  Will continue Lasix 40 mg IV qd w/ parameters for now since BP has been running soft.  Monitor electrolytes. EKG showed Sinus Tachycardia    Elevated BNP. Kidney failure, older age, and female sex all increase BNP. Obesity reduces BNP.   Recent TTE noted in 2024 noted. Will hold TTE until cardio provides recommendations.   Remote Tele. Daily Weights, Strict I & Os. Vitals q4hr   DASH/TLC diet.1500 mL Fluid restriction    Wean off Oxygen as tolerated    Cardio  Cardio consulted    Avoid NSAIDs or thiazolidinediones. Dietitian consult.    Problem/Plan - 2:  ·  Problem: Systemic inflammatory response syndrome (SIRS).   ·  Plan: Noted to have WBC 15K to 19K in early 2024. Tachycardic and Tachypneic today. Likely in the setting of CHF.   WBC 17K likely reactive  There are some mild congestive findings at this time new since  on CXR  Pending Full RVP and UA  F/U Blood cultures  Will hold ABX for now.    Problem/Plan - 3:  ·  Problem: Elevated troponin.   ·  Plan: EKG showing Sinus Tachycardia  Elevated Troponin   No chest pain  Patient on HF at this time  Most likely ischemic demand in the setting of CHF  F/U Repeat Cardiac Enzymes and EKG.    Problem/Plan - 4:  ·  Problem: Chronic respiratory failure.   ·  Plan: Patient uses 2 to 3L NC  Continue Oxygen Therapy   Will continue Albuterol   Will continue Spiriva   Rest as above.    Problem/Plan - 5:  ·  Problem: Chronic obstructive pulmonary disease (COPD).   ·  Plan: Not in exacerbation  Patient uses 2 to 3L NC  Continue Oxygen Therapy   Will continue Albuterol   Will continue Spiriva   Rest as above.    Problem/Plan - 6:  ·  Problem: ALDAIR (acute kidney injury).   ·  Plan: Baseline Cr 0.6 to 0.7  Likely secondary, but not limited to prerenal, post renal, CHF  F/U Urine Lytes  Calculate FeUrea  Currently on Diuresis for CHF.    Problem/Plan - 7:  ·  Problem: Prophylactic measure.   ·  Plan: Heparin SQ.      -GI/DVT Prophylaxis per protocol.    --------------------------------------------  Case discussed with   Education given on findings and plan of care  ___________________________  H. JANICE Welch.  Pager: [phone]       _________________________________________________________________________________________  ========>>  M E D I C A L   A T T E N D I N G    F O L L O W  U P  N O T E  <<=========  -----------------------------------------------------------------------------------------------------    - Patient seen and examined by me earlier today.   - In summary,  SHEKHAR VASQUEZ is a 73y year old woman admitted with Acute renal failure, shortness of breath, CHF  - Patient today overall doing ok, comfortable, eating OK.  Patient overall feels better today, decreased shortness of breath    ==================>> REVIEW OF SYSTEM <<=================    GEN: no fever, no chills, no pain  RESP:  SOB Improved, no cough, no sputum  CVS: no chest pain, no palpitations  GI: no abdominal pain, no nausea, Constantly feels thirsty >>>>  patient educated  : no dysuria, no frequency  Neuro: no headache, no dizziness    ==================>> PHYSICAL EXAM <<=================    GEN: A&O X 3 , NAD , comfortable, pleasant, calm in bed .. ( Patient at baseline bedbound/wheelchair-bound)  HEENT: NCAT, PERRL, MMM, hearing intact  CVS: S1S2 , regular , No M/R/G appreciated  PULM: CTA B/L,  limited exam due to body habitus.   ABD.: soft. non tender, non distended,  bowel sounds present, obese   Extrem: intact pulses , Chronic appearing lymphedema and chronic changes          ( Note written / Date of service 24 ( This is certified to be the same as "ENTERED" date above ( for billing purposes)))    ==================>> MEDICATIONS <<====================    MEDICATIONS  (STANDING):  aspirin enteric coated 81 milliGRAM(s) Oral daily  atorvastatin 40 milliGRAM(s) Oral at bedtime  busPIRone 10 milliGRAM(s) Oral two times a day  DULoxetine 60 milliGRAM(s) Oral daily  furosemide    Tablet 20 milliGRAM(s) Oral two times a day  gabapentin 100 milliGRAM(s) Oral two times a day  heparin   Injectable 5000 Unit(s) SubCutaneous every 12 hours  levothyroxine 125 MICROGram(s) Oral daily  melatonin 3 milliGRAM(s) Oral at bedtime  meropenem  IVPB 500 milliGRAM(s) IV Intermittent every 12 hours  meropenem  IVPB      multivitamin 1 Tablet(s) Oral daily  OLANZapine 10 milliGRAM(s) Oral at bedtime  propranolol 10 milliGRAM(s) Oral two times a day  tiotropium 2.5 MICROgram(s) Inhaler 2 Puff(s) Inhalation daily    MEDICATIONS  (PRN):  acetaminophen     Tablet .. 650 milliGRAM(s) Oral every 6 hours PRN Temp greater or equal to 38C (100.4F), Mild Pain (1 - 3)  albuterol    90 MICROgram(s) HFA Inhaler 2 Puff(s) Inhalation every 6 hours PRN Shortness of Breath and/or Wheezing    ___________  Active diet:  Diet, DASH/TLC:   Sodium & Cholesterol Restricted  1500mL Fluid Restriction ([id number])     Special Instructions for Nursin milliLiter(s) to 2000 milliLiter(s) fluid restriction  ___________________    ==================>> VITAL SIGNS <<==================    T(C): 36.4 (24 @ 16:15), Max: 36.8 (24 @ 04:27)  HR: 94 (24 @ 16:15) (72 - 121)  BP: 110/69 (24 @ 16:15) (82/50 - 135/80)  BP(mean): 86 (24 @ 05:05)  RR: 18 (24 @ 16:15) (18 - 22)  SpO2: 97% (24 @ 16:15) (90% - 100%)       POCT Blood Glucose.: 134 mg/dL (2024 03:47)       ==================>> LAB AND IMAGING <<==================                        9.2    14.23 )-----------( 181      ( 2024 01:48 )             29.2        WBC count:   13.31 <<== ,  14.23 <<== ,  17.51 <<==     02-14    136  |  105  |  103<H>  ----------------------------<  112<H>  4.8   |  18<L>  |  2.28<H>    Creatinine:  1.46  <<==, 2.28  <<==, 2.98  <<==    Ca    9.2      2024 04:08  Phos  5.7       Mg     1.8         TPro  6.4  /  Alb  2.2<L>  /  TBili  0.7  /  DBili  x   /  AST  45<H>  /  ALT  26  /  AlkPhos  108  -14    PT/INR - ( 2024 01:48 )   PT: 13.2 sec;   INR: 1.16 ratio         PTT - ( 2024 01:48 )  PTT:29.7 sec            CARDIAC MARKERS ( 2024 07:10 )  x     / x     / 722 U/L / x     / x           Urinalysis:  24 @ 21:32  Leuk. Est.: Moderate  Nirite: Negative  WBC: 25  Blood: Small     salt Crystal: --     calcium crystal: --     Bili: Negative     cast: --  color: Yellow  Bacteria: Many  Epith. cell: --  Yeast: --     Ketone: Negative     Protein: Trace     Glucose: Negative     sperm: --     Spec.Gravity: 1.011    TSH:      2.76   (24)           Lipid profile:  (24)     Total: 127     LDL  : (p)     HDL  :34     TG   :161     HgA1C:   (24)          (24)      6.4    Repeat echocardiogram pending    < from: US Renal (24 @ 11:34) >  IMPRESSION:  No evidence for bilateral hydronephrosis. Bilateral renal cysts.  < end of copied text >    < from: US Duplex Venous Lower Ext Complete, Bilateral (24 @ 09:25) >  IMPRESSION:  Very technically limited study with no evidence of deep venous thrombosis   in the visualized bilateral lower extremity veins.  < end of copied text >    ___________________________________________________________________________________  ===============>>  A S S E S S M E N T   A N D   P L A N <<===============  ------------------------------------------------------------------------------------------    · Assessment	  A 73 year old female, from home, wheelchair-bound, w/ PMHx COPD on 3 L NC at home, Obesity, Hypothyroidism, Diastolic HF on Lasix, Breast cancer s/p surgery, Asthma, Chronic lymphedema, Kidney cancer s/p partial nephrectomy, presented to the ED complaining of rapidly progressive dyspnea at rest that started 24 to 48 hours ago. Since then, she has developed significant orthopnea, abdominal girth, and leg swelling from her baseline. Admitted to telemetry for Acute on Chronic Diastolic HF.   Overnight patient develop hypotension, Lasix held, will restart low dose and spread out regimen. Cardiology recommend Doppler negative for DVT. Recommending ECHO. Nephrology consulted.      Problem/Plan - 1:  ·  Problem: Sepsis.   ·  Plan: secondary to ESBL UTI  continue Meropenem  blood culture negative  afebrile.     Problem/Plan - 2:  ·  Problem: Acute on chronic diastolic heart failure.   Cardiology appreciated  continue diuretics  repeat echocardiogram pending  Monitor creatinine closely on diuretics  Monitor vitals, labs, intake and output  Wean off Oxygen as tolerated       Problem/Plan - 3:  ·  Problem: Elevated troponin.   Likely demand ischemia from tachycardia and heart failure and sepsis   Limited utility and repeat troponin given ALDAIR  No chest pain  Cardiology following     Problem/Plan - 4:  ·  Problem: Acute on Chronic Hypoxemic respiratory failure.     Chronic COPD  ·  Plan: Patient uses 2 to 3L NC  Continue Oxygen Therapy And wean down to baseline  Albuterol /  Spiriva   Rest as above.     Problem/Plan - 5:  ·  Problem: ALDAIR (acute kidney injury).   ·  Plan: Baseline Cr 0.6 to 0.7  Nephrology Consult and management appreciated  Sonogram as above: no hydronephrosis  trend creatinine  Avoid nephrotoxic medications.     Problem/Plan - 6:  ·  Problem: Prophylactic measure.   ·  Plan: DVT PPX: Heparin SQ.    --------------------------------------------  Case discussed with Patient, primary cardiologist/PMD, Nurse Practitioner   Education given on findings and plan of care  ___________________________  HRenetta Welch D.O.  Pager: [phone]

## 2024-02-14 NOTE — PROGRESS NOTE ADULT - SUBJECTIVE AND OBJECTIVE BOX
NP Note discussed with  Primary Attending    Patient is a 73y old  Female who presents with a chief complaint of Acute on Chronic Diastolic HF (14 Feb 2024 10:50)      INTERVAL HPI/OVERNIGHT EVENTS: no new complaints    MEDICATIONS  (STANDING):  ascorbic acid 500 milliGRAM(s) Oral daily  aspirin enteric coated 81 milliGRAM(s) Oral daily  atorvastatin 40 milliGRAM(s) Oral at bedtime  busPIRone 10 milliGRAM(s) Oral two times a day  DULoxetine 60 milliGRAM(s) Oral daily  gabapentin 100 milliGRAM(s) Oral two times a day  heparin   Injectable 5000 Unit(s) SubCutaneous every 12 hours  levothyroxine 125 MICROGram(s) Oral daily  melatonin 3 milliGRAM(s) Oral at bedtime  meropenem  IVPB      meropenem  IVPB 500 milliGRAM(s) IV Intermittent every 12 hours  multivitamin 1 Tablet(s) Oral daily  OLANZapine 10 milliGRAM(s) Oral at bedtime  propranolol 10 milliGRAM(s) Oral two times a day  tiotropium 2.5 MICROgram(s) Inhaler 2 Puff(s) Inhalation daily    MEDICATIONS  (PRN):  acetaminophen     Tablet .. 650 milliGRAM(s) Oral every 6 hours PRN Temp greater or equal to 38C (100.4F), Mild Pain (1 - 3)  albuterol    90 MICROgram(s) HFA Inhaler 2 Puff(s) Inhalation every 6 hours PRN Shortness of Breath and/or Wheezing      __________________________________________________  REVIEW OF SYSTEMS:    CONSTITUTIONAL: No fever,   EYES: no acute visual disturbances  NECK: No pain or stiffness  RESPIRATORY: No cough; No shortness of breath  CARDIOVASCULAR: No chest pain, no palpitations  GASTROINTESTINAL: No pain. No nausea or vomiting; No diarrhea   NEUROLOGICAL: No headache or numbness, no tremors  MUSCULOSKELETAL: No joint pain, no muscle pain  GENITOURINARY: no dysuria, no frequency, no hesitancy  PSYCHIATRY: no depression , no anxiety  ALL OTHER  ROS negative        Vital Signs Last 24 Hrs  T(C): 36.4 (14 Feb 2024 07:40), Max: 36.8 (14 Feb 2024 04:27)  T(F): 97.5 (14 Feb 2024 07:40), Max: 98.2 (14 Feb 2024 04:27)  HR: 97 (14 Feb 2024 07:40) (72 - 121)  BP: 111/63 (14 Feb 2024 07:40) (82/50 - 135/80)  BP(mean): 86 (14 Feb 2024 05:05) (77 - 86)  RR: 18 (14 Feb 2024 07:40) (18 - 24)  SpO2: 98% (14 Feb 2024 07:40) (90% - 100%)    Parameters below as of 14 Feb 2024 07:40  Patient On (Oxygen Delivery Method): nasal cannula  O2 Flow (L/min): 3      ________________________________________________  PHYSICAL EXAM:  GENERAL: NAD  HEENT: Normocephalic;  conjunctivae and sclerae clear; moist mucous membranes;   NECK : supple  CHEST/LUNG: Clear to auscultation bilaterally with good air entry   HEART: S1 S2  regular; no murmurs, gallops or rubs  ABDOMEN: Soft, Nontender, Nondistended; Bowel sounds present  EXTREMITIES: no cyanosis; no edema; no calf tenderness  SKIN: warm and dry; no rash  NERVOUS SYSTEM:  Awake and alert; Oriented  to place, person and time ; no new deficits    _________________________________________________  LABS:                        9.2    14.23 )-----------( 181      ( 14 Feb 2024 01:48 )             29.2     02-14    136  |  105  |  103<H>  ----------------------------<  112<H>  4.8   |  18<L>  |  2.28<H>    Ca    9.2      14 Feb 2024 04:08  Phos  5.7     02-14  Mg     1.8     02-14    TPro  6.4  /  Alb  2.2<L>  /  TBili  0.7  /  DBili  x   /  AST  45<H>  /  ALT  26  /  AlkPhos  108  02-14    PT/INR - ( 14 Feb 2024 01:48 )   PT: 13.2 sec;   INR: 1.16 ratio         PTT - ( 14 Feb 2024 01:48 )  PTT:29.7 sec  Urinalysis Basic - ( 14 Feb 2024 04:08 )    Color: x / Appearance: x / SG: x / pH: x  Gluc: 112 mg/dL / Ketone: x  / Bili: x / Urobili: x   Blood: x / Protein: x / Nitrite: x   Leuk Esterase: x / RBC: x / WBC x   Sq Epi: x / Non Sq Epi: x / Bacteria: x      CAPILLARY BLOOD GLUCOSE      POCT Blood Glucose.: 134 mg/dL (14 Feb 2024 03:47)        RADIOLOGY & ADDITIONAL TESTS:    Imaging  Reviewed:  YES/NO    Consultant(s) Notes Reviewed:   YES/ No      Plan of care was discussed with patient and /or primary care giver; all questions and concerns were addressed  NP Note discussed with  Primary Attending    Patient is a 73y old  Female who presents with a chief complaint of Acute on Chronic Diastolic HF (14 Feb 2024 10:50)      INTERVAL HPI/OVERNIGHT EVENTS: no new complaints    MEDICATIONS  (STANDING):  ascorbic acid 500 milliGRAM(s) Oral daily  aspirin enteric coated 81 milliGRAM(s) Oral daily  atorvastatin 40 milliGRAM(s) Oral at bedtime  busPIRone 10 milliGRAM(s) Oral two times a day  DULoxetine 60 milliGRAM(s) Oral daily  gabapentin 100 milliGRAM(s) Oral two times a day  heparin   Injectable 5000 Unit(s) SubCutaneous every 12 hours  levothyroxine 125 MICROGram(s) Oral daily  melatonin 3 milliGRAM(s) Oral at bedtime  meropenem  IVPB      meropenem  IVPB 500 milliGRAM(s) IV Intermittent every 12 hours  multivitamin 1 Tablet(s) Oral daily  OLANZapine 10 milliGRAM(s) Oral at bedtime  propranolol 10 milliGRAM(s) Oral two times a day  tiotropium 2.5 MICROgram(s) Inhaler 2 Puff(s) Inhalation daily    MEDICATIONS  (PRN):  acetaminophen     Tablet .. 650 milliGRAM(s) Oral every 6 hours PRN Temp greater or equal to 38C (100.4F), Mild Pain (1 - 3)  albuterol    90 MICROgram(s) HFA Inhaler 2 Puff(s) Inhalation every 6 hours PRN Shortness of Breath and/or Wheezing      __________________________________________________  REVIEW OF SYSTEMS:    CONSTITUTIONAL: No fever,   EYES: no acute visual disturbances  NECK: No pain or stiffness  RESPIRATORY: No cough; No shortness of breath  CARDIOVASCULAR: No chest pain, no palpitations  GASTROINTESTINAL: No pain. No nausea or vomiting; No diarrhea   NEUROLOGICAL: No headache or numbness, no tremors  MUSCULOSKELETAL: No joint pain, no muscle pain  GENITOURINARY: no dysuria, no frequency, no hesitancy  PSYCHIATRY: no depression , no anxiety  ALL OTHER  ROS negative        Vital Signs Last 24 Hrs  T(C): 36.4 (14 Feb 2024 07:40), Max: 36.8 (14 Feb 2024 04:27)  T(F): 97.5 (14 Feb 2024 07:40), Max: 98.2 (14 Feb 2024 04:27)  HR: 97 (14 Feb 2024 07:40) (72 - 121)  BP: 111/63 (14 Feb 2024 07:40) (82/50 - 135/80)  BP(mean): 86 (14 Feb 2024 05:05) (77 - 86)  RR: 18 (14 Feb 2024 07:40) (18 - 24)  SpO2: 98% (14 Feb 2024 07:40) (90% - 100%)    Parameters below as of 14 Feb 2024 07:40  Patient On (Oxygen Delivery Method): nasal cannula  O2 Flow (L/min): 3      ________________________________________________  PHYSICAL EXAM:  GENERAL: NAD  HEENT: Normocephalic;  conjunctivae and sclerae clear; moist mucous membranes;   NECK : supple  CHEST/LUNG: mild bibasilar crackles with good air entry   HEART: S1 S2  regular; no murmurs, gallops or rubs  ABDOMEN: obese, Soft, Nontender, Nondistended; Bowel sounds present  EXTREMITIES: supper and lower pitting edema +2-3, no cyanosis; no edema; no calf tenderness  SKIN: generalized ecchymosis, warm and dry; no rash  NERVOUS SYSTEM:  Awake and alert; Oriented  to place, person, confused    _________________________________________________  LABS:                        9.2    14.23 )-----------( 181      ( 14 Feb 2024 01:48 )             29.2     02-14    136  |  105  |  103<H>  ----------------------------<  112<H>  4.8   |  18<L>  |  2.28<H>    Ca    9.2      14 Feb 2024 04:08  Phos  5.7     02-14  Mg     1.8     02-14    TPro  6.4  /  Alb  2.2<L>  /  TBili  0.7  /  DBili  x   /  AST  45<H>  /  ALT  26  /  AlkPhos  108  02-14    PT/INR - ( 14 Feb 2024 01:48 )   PT: 13.2 sec;   INR: 1.16 ratio         PTT - ( 14 Feb 2024 01:48 )  PTT:29.7 sec  Urinalysis Basic - ( 14 Feb 2024 04:08 )    Color: x / Appearance: x / SG: x / pH: x  Gluc: 112 mg/dL / Ketone: x  / Bili: x / Urobili: x   Blood: x / Protein: x / Nitrite: x   Leuk Esterase: x / RBC: x / WBC x   Sq Epi: x / Non Sq Epi: x / Bacteria: x      CAPILLARY BLOOD GLUCOSE      POCT Blood Glucose.: 134 mg/dL (14 Feb 2024 03:47)    RADIOLOGY & ADDITIONAL TESTS:  < from: US Renal (02.14.24 @ 11:34) >    ACC: [id number] EXAM:  US KIDNEY(S)   ORDERED BY: ELENA MOORE     PROCEDURE DATE:  02/14/2024          INTERPRETATION:  CLINICAL INFORMATION: Acute renal insufficiency.   Evaluate for urinary retention.    COMPARISON: None available.    TECHNIQUE: Sonography of the kidneys and bladder. The sonographer noted   that the evaluation was technically difficult related to patient body   habitus.    FINDINGS:  Right kidney: 11.4 cm. No hydronephrosis or calculi. 5.6 x 5.4 cm   septated cyst lower pole region.    Left kidney: 10.7 cm. No hydronephrosis or calculi. 3.7 x 3.0 cm cyst   lower pole region.    Urinary bladder: Visualized portions appear unremarkable.    IMPRESSION:  No evidence for bilateral hydronephrosis. Bilateral renal cysts.        --- End of Report ---    < end of copied text >  < from: US Duplex Venous Lower Ext Complete, Bilateral (02.14.24 @ 09:25) >  ACC: [id number] EXAM:  US DPLX LWR EXT VEINS COMPL BI   ORDERED BY: ELENA MOORE     PROCEDURE DATE:  02/14/2024          INTERPRETATION:  CLINICAL INFORMATION: Lower extremity edema    COMPARISON: Bilateral lower extremity venous Doppler 1/16/2024.    TECHNIQUE: Duplex sonography of the BILATERAL LOWER extremity veins with   color and spectral Doppler, with and without compression. The examination   was very technically limited secondary to a combination of difficulty   with patient positioning and overlying bandages and open wounds.    FINDINGS:    RIGHT:  Normal compressibility of the RIGHT common femoral and femoral veins.   Popliteal vein is patent. Compression of the popliteal vein was not   performed..  Doppler examination shows normal spontaneous and phasic flow.  Limited evaluation of the calf veins. Visualized calf veins are patent,   however compression was not performed.    LEFT:  Normal compressibility of the LEFT common femoral and femoral veins.   Popliteal vein is patent. Compression of the popliteal vein was not   performed..  Doppler examination shows normal spontaneous and phasic flow.  Limited evaluation of the calf veins. Visualized calf veins are patent,   however compression was not performed.    IMPRESSION:    Very technically limited study with no evidence of deep venous thrombosis   in the visualized bilateral lower extremity veins.    --- End of Report ---    < end of copied text >  < from: Xray Chest 1 View-PORTABLE IMMEDIATE (Xray Chest 1 View-PORTABLE IMMEDIATE .) (02.13.24 @ 13:23) >  ACC: [id number] EXAM:  XR CHEST PORTABLE IMMED 1V   ORDERED BY: MANNIE PRICE     PROCEDURE DATE:  02/13/2024          INTERPRETATION:  AP semierect chest on February 13, 2024 at 1:13 PM.   Patient is short of breath.    Heart magnified by technique. Cervical spine hardware again noted.    There is mild left base fluid new since January 29 and a slight central   congestive picture also new.    IMPRESSION: There are some mild congestive findings at this time new   since January 29.    --- End of Report ---    < end of copied text >  < from: Xray Tibia + Fibula 2 Views, Bilateral (02.13.24 @ 11:15) >    ACC: [id number] EXAM:  XR PELVIS AP ONLY 1-2 VIEWS   ORDERED BY: RADHA BAEZ     ACC: [id number] EXAM:  XR KNEE COMP 4+ VIEWS BI   ORDERED BY: RADHA HENRYD     ACC: [id number] EXAM:  XR FEMUR 2 VIEWS BI   ORDERED BY: RADHA HENRYD     ACC: [id number] EXAM:  XR TIB FIB AP LAT 2 VIEWS BI   ORDERED BY: RADHA BAEZ     PROCEDURE DATE:  02/13/2024          INTERPRETATION:  EXAM: XR FEMUR 2 VIEWS BILATERAL, XR TIBIA FIBULA AP AND   LATERAL 2 VIEWS BILATERAL, XR KNEE COMPLETE 4 VIEWS BILATERAL, XR PELVIS  AP ONLY 1 OR 2 VIEWS    INDICATION: b/l lower ext pain HXR    COMPARISON: See below    IMPRESSION:    AP pelvis: Large abdominal pannus limits evaluation. No acute fracture   noted. There may be some cortical irregularity related to the lower   lumbar spine. Follow-up suggested. Dilated small bowel loops are noted.   Follow-up is suggested.    Bilateral femur:    Right femur shows no acute fracture. Exam is limited due to the large   abdominal pannus.    Clinical left femur shows no acute fracture or dislocation. Large   abdominal pannus limits evaluation.    Bilateral knees:    Right knee shows some narrowing at the medial compartment no acute   fracture or dislocation.    Left knee shows some narrowing of the medial compartment. No acute   fracture or dislocation.    Prominent edematous changes both thighs and knee region.    Bilateral tibia-fibula:    Right tibia fibula shows plate and screws related to the distal tibia and   fibula with some osteopenia. No acute fracture or dislocation. Some   sclerotic changes as well as some osteopenia related to the tarsal   region. Edematous changes.    Left tibia fibula shows no acute fracture or dislocation. Some   osteopenia. Edematous changes related to the calf as on the right. Knee   findings as above. Follow-up suggested as indicated clinically.    --- End of Report ---    < end of copied text >  < from: CT Head No Cont (02.13.24 @ 10:12) >  ACC: [id number] EXAM:  CT CERVICAL SPINE   ORDERED BY: RADHA BAEZ     ACC: [id number] EXAM:  CT BRAIN   ORDERED BY: RADHA BAEZ     PROCEDURE DATE:  02/13/2024          INTERPRETATION:  EXAM: CT HEAD AND CERVICAL SPINE WITHOUT INTRAVENOUS   CONTRAST    HISTORY: Trauma    TECHNIQUE: Multiple axial images were obtained of the head and cervical   spine. Sagittal and coronal reformatted images were obtained from the   axial data set. The images were reviewed in brain and bone windows.    COMPARISON:CT head/cervical spine February 7, 2024    FINDINGS:    CT HEAD:  No acute intracranial hemorrhage. Areas of decreased attenuation in the   deep and periventricular white matter, compatible with chronic small   vessel disease. No hydrocephalus. The extra-axial spaces and basal   cisterns are within normal limits. No midline shift or mass effect   present.    The cranial cervical junction is within normal limits. The sella is not   expanded. No depressed calvarial fracture. Mucous retention cyst in the   left maxillary sinus. Small right mastoid effusion. The visualized orbits   are status post cataract surgery and are mildly proptotic.    CT CERVICAL SPINE:    Postoperative changes related to a ACDF of C3-C6 and posterior spinal   fusion of C3-T2 with laminectomies C4-C6. Bilateral pedicle screws are   identified at C3-C6 and T1-T2. The orthopedic hardware appears intact   without [place] evidence of loosening or fracture. Streak artifact from the   orthopedic hardware greatly limits the evaluation of the adjacent levels.    The atlantodental interval is within normal limits. The lateral masses   are properly aligned. No facet subluxation or malalignment at the   craniovertebral or atlantoaxial articulations. No dens fracture. No   significant prevertebral soft tissue swelling.    The cervical lordosis is mildly exaggerated. Mild retrolisthesis of C2 on   C3. Vertebral body height is well-maintained. Poor visualization of the   intervertebral discs, however they appear shortened with chronic endplate   change. No [place] evidence of jumped or perched facets. No gross/displaced   acute osseous fracture.    Greatly limited evaluation of the neuroforamina and spinal canal   secondary to streak artifact.    The paraspinal muscles are unremarkable.    Visualized portions of the thyroid are unremarkable.    Atelectatic change in the bilateral lung apices.      IMPRESSION:    CT HEAD:  1.  No evidence of acute intracranial hemorrhage or midline shift.  2.  Chronic small vessel disease.    CT CERVICAL SPINE:  1.  No evidence of gross/displaced acute osseous fracture or [place]   dislocation.  2.  Postoperative changes of the cervical spine as discussed above.   Streak artifact from the orthopedic hardware greatly limits the   evaluation of the adjacent levels.  3.  Multilevel degenerative change of the cervical spine.    --- End of Report ---      < end of copied text >    Imaging  Reviewed:  YES    Consultant(s) Notes Reviewed:   YES      Plan of care was discussed with patient and /or primary care giver; all questions and concerns were addressed

## 2024-02-14 NOTE — CHART NOTE - NSCHARTNOTEFT_GEN_A_CORE
Called by RN, Patient lost peripheral IV, infiltrated. Pt seen and examined at bedside, awake, alert and oriented x 2-3, reoriented to time. Pt with left arm precautions. Right arm assessed, ecchymotic and swollen, attempted to place IV, unable to do. MD Biggs contacted for guided US peripheral IV. Now, Pt upset, refusing peripheral IV placement and scheduled oral medications as well. Will approach patient later for possibly peripheral IV placement.

## 2024-02-14 NOTE — PROGRESS NOTE ADULT - ASSESSMENT
Patient is a 72yo Obese Female wheelchair bound with COPD on 3 L NC at home, hypothyroid, diastolic CHF on Lasix, HTN on Losartan, breast cancer s/p surgery, asthma, chronic lymphedema, Left kidney cancer  s/p partial nephrectomy, p/w SOB and worsening LE edema. Pt a/w acute on chronic diastolic HF and ALDAIR. Nephrology consulted for Elevated serum creatinine.    1. ALDAIR- last SCr 0.6-0.7. ALDAIR likely hemodynamically mediated in the setting of hypotension with acute on chronic dHF. UA active in the setting of infection. FeUrea 29%; low EABV. Renal function improving with diuresis  Albumin 1.9; will check spot UPr/Cr to r/o Nephrotic syndrome. However UA with only trace protein; Renal US with no hydro but b/l renal cyst with rt cyst 5.6 cm with septations; recc outpt Urology f/u. . Strict I/Os. Avoid nephrotoxins/ NSAIDs/ RCA. Monitor BMP.  2. Essential HTN- BP low normal, pt on propanolol for tachycardia. Plan as per Cards. Monitor BP.   3. LE edema- Lasix IV changed to 20mg PO bid due to relative hypotension.  Daily weights. Strict I/Os. Dopplers  - limited studies; no  DVTs seen  If worsening edema; can consider albumin assisted diuresis  4. Acute on chronic Diastolic HF- plan as per Cards/ primary team      [facility] NEPHROLOGY  Hernandez Forbes M.D.  Damion Dumont D.O.  Sydney Emanuel M.D.  MD Yeiym Tello, MSN, ANP-C    Telephone: [phone]  Facsimile: [phone], [address]  [place] [address]

## 2024-02-14 NOTE — PROGRESS NOTE ADULT - ASSESSMENT
A 73 year old female, from home, wheelchair-bound, w/ PMHx COPD on 3 L NC at home, Obesity, Hypothyroidism, Diastolic HF on Lasix, Breast cancer s/p surgery, Asthma, Chronic lymphedema, Kidney cancer s/p partial nephrectomy, presented to the ED complaining of rapidly progressive dyspnea at rest that started 24 to 48 hours ago. Since then, she has developed significant orthopnea, abdominal girth, and leg swelling from her baseline. Admitted to telemetry for Acute on Chronic Diastolic HF.   Overnight patient develop hypotension, Lasix held, will restart low dose and spread out regimen. Cardiology recommend Doppler negative for DVT. Recommending ECHO. Nephrology consulted.

## 2024-02-14 NOTE — PATIENT PROFILE ADULT - FALL HARM RISK - HARM RISK INTERVENTIONS
Assistance with ambulation/Assistance OOB with selected safe patient handling equipment/Communicate Risk of Fall with Harm to all staff/Discuss with provider need for PT consult/Monitor gait and stability/Provide patient with walking aids - walker, cane, crutches/Reinforce activity limits and safety measures with patient and family/Tailored Fall Risk Interventions/Toileting schedule using arm’s reach rule for commode and bathroom/Visual Cue: Yellow wristband and red socks/Bed in lowest position, wheels locked, appropriate side rails in place/Call bell, personal items and telephone in reach/Instruct patient to call for assistance before getting out of bed or chair/Non-slip footwear when patient is out of bed/[place] to call system/Physically safe environment - no spills, clutter or unnecessary equipment/Purposeful Proactive Rounding/Room/bathroom lighting operational, light cord in reach

## 2024-02-15 ENCOUNTER — RESULT REVIEW (OUTPATIENT)
Age: 74
End: 2024-02-15

## 2024-02-15 DIAGNOSIS — I48.91 UNSPECIFIED ATRIAL FIBRILLATION: ICD-10-CM

## 2024-02-15 LAB
ALBUMIN SERPL ELPH-MCNC: 1.9 G/DL — LOW (ref 3.5–5)
ALP SERPL-CCNC: 107 U/L — SIGNIFICANT CHANGE UP (ref 40–120)
ALT FLD-CCNC: 25 U/L DA — SIGNIFICANT CHANGE UP (ref 10–60)
ANION GAP SERPL CALC-SCNC: 6 MMOL/L — SIGNIFICANT CHANGE UP (ref 5–17)
AST SERPL-CCNC: 35 U/L — SIGNIFICANT CHANGE UP (ref 10–40)
BILIRUB SERPL-MCNC: 0.7 MG/DL — SIGNIFICANT CHANGE UP (ref 0.2–1.2)
BUN SERPL-MCNC: 62 MG/DL — HIGH (ref 7–18)
CALCIUM SERPL-MCNC: 8.9 MG/DL — SIGNIFICANT CHANGE UP (ref 8.4–10.5)
CHLORIDE SERPL-SCNC: 109 MMOL/L — HIGH (ref 96–108)
CO2 SERPL-SCNC: 25 MMOL/L — SIGNIFICANT CHANGE UP (ref 22–31)
CREAT ?TM UR-MCNC: 56 MG/DL — SIGNIFICANT CHANGE UP
CREAT SERPL-MCNC: 0.82 MG/DL — SIGNIFICANT CHANGE UP (ref 0.5–1.3)
EGFR: 75 ML/MIN/1.73M2 — SIGNIFICANT CHANGE UP
GLUCOSE SERPL-MCNC: 107 MG/DL — HIGH (ref 70–99)
HCT VFR BLD CALC: 27.1 % — LOW (ref 34.5–45)
HGB BLD-MCNC: 8.6 G/DL — LOW (ref 11.5–15.5)
MAGNESIUM SERPL-MCNC: 1.6 MG/DL — SIGNIFICANT CHANGE UP (ref 1.6–2.6)
MCHC RBC-ENTMCNC: 30.2 PG — SIGNIFICANT CHANGE UP (ref 27–34)
MCHC RBC-ENTMCNC: 31.7 GM/DL — LOW (ref 32–36)
MCV RBC AUTO: 95.1 FL — SIGNIFICANT CHANGE UP (ref 80–100)
NRBC # BLD: 0 /100 WBCS — SIGNIFICANT CHANGE UP (ref 0–0)
PLATELET # BLD AUTO: 150 K/UL — SIGNIFICANT CHANGE UP (ref 150–400)
POTASSIUM SERPL-MCNC: 3.5 MMOL/L — SIGNIFICANT CHANGE UP (ref 3.5–5.3)
POTASSIUM SERPL-SCNC: 3.5 MMOL/L — SIGNIFICANT CHANGE UP (ref 3.5–5.3)
PROT ?TM UR-MCNC: 84 MG/DL — HIGH (ref 0–12)
PROT SERPL-MCNC: 6 G/DL — SIGNIFICANT CHANGE UP (ref 6–8.3)
RBC # BLD: 2.85 M/UL — LOW (ref 3.8–5.2)
RBC # FLD: 16.1 % — HIGH (ref 10.3–14.5)
SODIUM SERPL-SCNC: 140 MMOL/L — SIGNIFICANT CHANGE UP (ref 135–145)
WBC # BLD: 13.46 K/UL — HIGH (ref 3.8–10.5)
WBC # FLD AUTO: 13.46 K/UL — HIGH (ref 3.8–10.5)

## 2024-02-15 PROCEDURE — 93010 ELECTROCARDIOGRAM REPORT: CPT

## 2024-02-15 PROCEDURE — 93306 TTE W/DOPPLER COMPLETE: CPT | Mod: 26

## 2024-02-15 RX ORDER — DILTIAZEM HCL 120 MG
5 CAPSULE, EXT RELEASE 24 HR ORAL
Qty: 125 | Refills: 0 | Status: DISCONTINUED | OUTPATIENT
Start: 2024-02-15 | End: 2024-02-15

## 2024-02-15 RX ORDER — METOPROLOL TARTRATE 50 MG
25 TABLET ORAL
Refills: 0 | Status: DISCONTINUED | OUTPATIENT
Start: 2024-02-15 | End: 2024-02-18

## 2024-02-15 RX ORDER — ENOXAPARIN SODIUM 100 MG/ML
100 INJECTION SUBCUTANEOUS EVERY 12 HOURS
Refills: 0 | Status: DISCONTINUED | OUTPATIENT
Start: 2024-02-15 | End: 2024-02-18

## 2024-02-15 RX ADMIN — TIOTROPIUM BROMIDE 2 PUFF(S): 18 CAPSULE ORAL; RESPIRATORY (INHALATION) at 11:30

## 2024-02-15 RX ADMIN — HEPARIN SODIUM 5000 UNIT(S): 5000 INJECTION INTRAVENOUS; SUBCUTANEOUS at 06:52

## 2024-02-15 RX ADMIN — ENOXAPARIN SODIUM 100 MILLIGRAM(S): 100 INJECTION SUBCUTANEOUS at 19:49

## 2024-02-15 RX ADMIN — OLANZAPINE 10 MILLIGRAM(S): 15 TABLET, FILM COATED ORAL at 21:15

## 2024-02-15 RX ADMIN — Medication 10 MILLIGRAM(S): at 18:25

## 2024-02-15 RX ADMIN — Medication 81 MILLIGRAM(S): at 11:31

## 2024-02-15 RX ADMIN — Medication 3 MILLIGRAM(S): at 21:15

## 2024-02-15 RX ADMIN — Medication 1 TABLET(S): at 11:47

## 2024-02-15 RX ADMIN — ATORVASTATIN CALCIUM 40 MILLIGRAM(S): 80 TABLET, FILM COATED ORAL at 21:15

## 2024-02-15 RX ADMIN — Medication 650 MILLIGRAM(S): at 11:30

## 2024-02-15 RX ADMIN — Medication 650 MILLIGRAM(S): at 10:30

## 2024-02-15 RX ADMIN — Medication 25 MILLIGRAM(S): at 18:25

## 2024-02-15 RX ADMIN — DULOXETINE HYDROCHLORIDE 60 MILLIGRAM(S): 30 CAPSULE, DELAYED RELEASE ORAL at 13:52

## 2024-02-15 RX ADMIN — Medication 25 MILLIGRAM(S): at 11:30

## 2024-02-15 RX ADMIN — Medication 20 MILLIGRAM(S): at 13:52

## 2024-02-15 RX ADMIN — GABAPENTIN 100 MILLIGRAM(S): 400 CAPSULE ORAL at 18:25

## 2024-02-15 RX ADMIN — MEROPENEM 100 MILLIGRAM(S): 1 INJECTION INTRAVENOUS at 18:25

## 2024-02-15 NOTE — PROGRESS NOTE ADULT - SUBJECTIVE AND OBJECTIVE BOX
[facility] NEPHROLOGY- PROGRESS NOTE    Patient is a 74yo Obese Female wheelchair bound with COPD on 3 L NC at home, hypothyroid, diastolic CHF on Lasix, HTN on Losartan, breast cancer s/p surgery, asthma, chronic lymphedema, Left kidney cancer  s/p partial nephrectomy, p/w SOB and worsening LE edema. Pt a/w acute on chronic diastolic HF and ALDAIR. Nephrology consulted for Elevated serum creatinine.    Hospital Medications: Medications reviewed.  REVIEW OF SYSTEMS:  CONSTITUTIONAL: No fevers or chills  RESPIRATORY: No shortness of breath  CARDIOVASCULAR: No chest pain.  GASTROINTESTINAL: No nausea, vomiting, diarrhea or abdominal pain.   VASCULAR: No bilateral lower extremity edema.     VITALS:  T(F): 97.5 (02-15-24 @ 12:06), Max: 98.4 (02-15-24 @ 08:22)  HR: 124 (02-15-24 @ 12:06)  BP: 110/62 (02-15-24 @ 12:06)  RR: 20 (02-15-24 @ 12:06)  SpO2: 99% (02-15-24 @ 12:06)  Wt(kg): --    02-14 @ 07:01  -  02-15 @ 07:00  --------------------------------------------------------  IN: 0 mL / OUT: 300 mL / NET: -300 mL      PHYSICAL EXAM:  Gen: Obese  HEENT: anicteric  Neck: no JVD  Cards: RRR, +S1/S2, no M/G/R  Resp: B/L rhonchi  GI: soft, NT/ND, NABS  : no CVA tenderness  Extremities: + b/l lymphedema L>R.; improving  Derm: RLE open wound      LABS:  02-15    140  |  109<H>  |  62<H>  ----------------------------<  107<H>  3.5   |  25  |  0.82    Ca    8.9      15 Feb 2024 12:14  Phos  4.0     02-14  Mg     1.6     02-15    TPro  6.0  /  Alb  1.9<L>  /  TBili  0.7  /  DBili      /  AST  35  /  ALT  25  /  AlkPhos  107  02-15    Creatinine Trend: 0.82 <--, 1.46 <--, 2.28 <--, 2.98 <--                        8.6    13.46 )-----------( 150      ( 15 Feb 2024 12:14 )             27.1     Urine Studies:  Urinalysis Basic - ( 15 Feb 2024 12:14 )    Color:  / Appearance:  / SG:  / pH:   Gluc: 107 mg/dL / Ketone:   / Bili:  / Urobili:    Blood:  / Protein:  / Nitrite:    Leuk Esterase:  / RBC:  / WBC    Sq Epi:  / Non Sq Epi:  / Bacteria:       Creatinine, Random Urine: 56 mg/dL (02-14 @ 23:50)  Creatinine, Random Urine: 35 mg/dL (02-13 @ 21:32)

## 2024-02-15 NOTE — ADVANCED PRACTICE NURSE CONSULT - RECOMMEDATIONS
-Clean all wounds with normal saline and apply skin prep to the surrounding skin  -Apply TRIAD Moisture Barrier Cream to the Bilateral Gluteus, Bilateral Post Thigh, Perianal, and Gluteal Fold areas b.i.d. PRN  -Frequent toileting  -Apply Xeroform gauze to the R. Lower Leg wound, cover with an ABD pad, wrap with Kerlex, and secure with tape Daily PRN  -Elevate/float the patients heels using heel protectors and reposition the patient Q 2hrs using wedges or pillows

## 2024-02-15 NOTE — ADVANCED PRACTICE NURSE CONSULT - ASSESSMENT
This is a 73yr old female patient admitted for Heart Failure, presenting with the following:  -There is evidence of ecchymosis to the Bilateral Upper and Lower Extremities  -There is a Skin Tear (x2) to the R. Lower Leg with pink tissue, red tissue, and scant drainage  -There is evidence of Moisture Associated Skin Damage to the Bilateral Gluteus, Bilateral Post Thigh, Perianal, and Gluteal Fold areas  -There is a Stage 1 Pressure injury to the Bilateral Heels, as evident by non-blanchable erythema

## 2024-02-15 NOTE — PROGRESS NOTE ADULT - SUBJECTIVE AND OBJECTIVE BOX
C A R D I O L O G Y  **********************************     DATE OF SERVICE: 02-15-24    Patient denies chest pain or shortness of breath.   Review of systems otherwise (-)  	  MEDICATIONS:  MEDICATIONS  (STANDING):  aspirin enteric coated 81 milliGRAM(s) Oral daily  atorvastatin 40 milliGRAM(s) Oral at bedtime  busPIRone 10 milliGRAM(s) Oral two times a day  diltiazem Infusion 5 mG/Hr (5 mL/Hr) IV Continuous <Continuous>  DULoxetine 60 milliGRAM(s) Oral daily  furosemide    Tablet 20 milliGRAM(s) Oral two times a day  gabapentin 100 milliGRAM(s) Oral two times a day  heparin   Injectable 5000 Unit(s) SubCutaneous every 12 hours  influenza  Vaccine (HIGH DOSE) 0.7 milliLiter(s) IntraMuscular once  levothyroxine 125 MICROGram(s) Oral daily  melatonin 3 milliGRAM(s) Oral at bedtime  meropenem  IVPB 500 milliGRAM(s) IV Intermittent every 12 hours  meropenem  IVPB      metoprolol tartrate 25 milliGRAM(s) Oral two times a day  multivitamin 1 Tablet(s) Oral daily  OLANZapine 10 milliGRAM(s) Oral at bedtime  tiotropium 2.5 MICROgram(s) Inhaler 2 Puff(s) Inhalation daily      LABS:	 	    CARDIAC MARKERS:  CARDIAC MARKERS ( 13 Feb 2024 07:10 )  x     / x     / 722 U/L / x     / x            Troponin I, High Sensitivity Result: 91.3 ng/L (02-14-24 @ 17:55)  Troponin I, High Sensitivity Result: 117.4 ng/L (02-14-24 @ 04:08)                              8.6    13.46 )-----------( 150      ( 15 Feb 2024 12:14 )             27.1     Hemoglobin: 8.6 g/dL (02-15 @ 12:14)  Hemoglobin: 8.7 g/dL (02-14 @ 17:55)  Hemoglobin: 9.2 g/dL (02-14 @ 01:48)  Hemoglobin: 10.0 g/dL (02-13 @ 07:10)      02-15    140  |  109<H>  |  62<H>  ----------------------------<  107<H>  3.5   |  25  |  0.82    Ca    8.9      15 Feb 2024 12:14  Phos  4.0     02-14  Mg     1.6     02-15    TPro  6.0  /  Alb  1.9<L>  /  TBili  0.7  /  DBili  x   /  AST  35  /  ALT  25  /  AlkPhos  107  02-15    Creatinine Trend: 0.82<--, 1.46<--, 2.28<--, 2.98<--, 0.66<--, 0.77<--      PHYSICAL EXAM:  T(C): 36.4 (02-15-24 @ 12:06), Max: 36.9 (02-15-24 @ 08:22)  HR: 124 (02-15-24 @ 12:06) (61 - 124)  BP: 110/62 (02-15-24 @ 12:06) (97/65 - 118/100)  RR: 20 (02-15-24 @ 12:06) (18 - 20)  SpO2: 99% (02-15-24 @ 12:06) (94% - 99%)  Wt(kg): --  I&O's Summary    14 Feb 2024 07:01  -  15 Feb 2024 07:00  --------------------------------------------------------  IN: 0 mL / OUT: 300 mL / NET: -300 mL      HEENT:  (-)icterus (-)pallor  CV: N S1 S2 1/6 MANAN (+)2 Pulses B/l  Resp:  Clear to ausculatation B/L, normal effort  GI: (+) BS Soft, NT, ND  Lymph:  (-)Edema, (-)obvious lymphadenopathy  Skin: Warm to touch, Normal turgor  Psych: Appropriate mood and affect      TELEMETRY: 	  afib        ASSESSMENT/PLAN: 	73y  Female , wheelchair-bound, w/ PMHx COPD on 3 L NC at home, Obesity, Hypothyroidism, Diastolic HF on Lasix, Normal lV and RV fx 1/24 at [facility] follows with Dr. Lozoya, Breast cancer s/p surgery, Asthma, Chronic lymphedema, Kidney cancer s/p partial nephrectomy, presented to the ED complaining of rapidly progressive dyspnea at rest that started 24 to 48 hours ago    # SOB  - Significantly elevated BNP since January admision re-check echo    # Afib  - New onset afib  - Start Lopressor 25 mg PO BID  - Cardizem gtt unti loral meds reach theraputic levels  - Theraputic Lovenox   - echo    # Edema  - low albumin ? third spacng  - ? nephrotic syndrome or protein loosing process  - check prealbumin  - lower extremity dopplers     # Renal failure  - renal f/u    Thanh Quezada MD, [facility]  BEEPER [phone]

## 2024-02-15 NOTE — PROGRESS NOTE ADULT - ASSESSMENT
_________________________________________________________________________________________  ========>>  M E D I C A L   A T T E N D I N G    F O L L O W  U P  N O T E  <<=========  -----------------------------------------------------------------------------------------------------    - Patient seen and examined by me earlier today.   - In summary,  SHEKHAR VASQUEZ is a 73y year old woman admitted with Acute renal failure, shortness of breath, CHF  - Patient today overall doing ok, comfortable, eating OK.  Patient overall feels better today, decreased shortness of breath    ==================>> REVIEW OF SYSTEM <<=================    GEN: no fever, no chills, no pain  RESP:  SOB Improved, no cough, no sputum  CVS: no chest pain, no palpitations  GI: no abdominal pain, no nausea, Constantly feels thirsty >>>>  patient educated  : no dysuria, no frequency  Neuro: no headache, no dizziness    ==================>> PHYSICAL EXAM <<=================    GEN: A&O X 3 , NAD , comfortable, pleasant, calm in bed .. ( Patient at baseline bedbound/wheelchair-bound)  HEENT: NCAT, PERRL, MMM, hearing intact  CVS: S1S2 , regular , No M/R/G appreciated  PULM: CTA B/L,  limited exam due to body habitus.   ABD.: soft. non tender, non distended,  bowel sounds present, obese   Extrem: intact pulses , Chronic appearing lymphedema and chronic changes        ( Note written / Date of service 02-15-24 ( This is certified to be the same as "ENTERED" date above ( for billing purposes)))    ==================>> MEDICATIONS <<====================    aspirin enteric coated 81 milliGRAM(s) Oral daily  atorvastatin 40 milliGRAM(s) Oral at bedtime  busPIRone 10 milliGRAM(s) Oral two times a day  DULoxetine 60 milliGRAM(s) Oral daily  enoxaparin Injectable 100 milliGRAM(s) SubCutaneous every 12 hours  furosemide    Tablet 20 milliGRAM(s) Oral two times a day  gabapentin 100 milliGRAM(s) Oral two times a day  influenza  Vaccine (HIGH DOSE) 0.7 milliLiter(s) IntraMuscular once  levothyroxine 125 MICROGram(s) Oral daily  melatonin 3 milliGRAM(s) Oral at bedtime  meropenem  IVPB 500 milliGRAM(s) IV Intermittent every 12 hours  meropenem  IVPB      metoprolol tartrate 25 milliGRAM(s) Oral two times a day  multivitamin 1 Tablet(s) Oral daily  OLANZapine 10 milliGRAM(s) Oral at bedtime  tiotropium 2.5 MICROgram(s) Inhaler 2 Puff(s) Inhalation daily    MEDICATIONS  (PRN):  acetaminophen     Tablet .. 650 milliGRAM(s) Oral every 6 hours PRN Temp greater or equal to 38C (100.4F), Mild Pain (1 - 3)  albuterol    90 MICROgram(s) HFA Inhaler 2 Puff(s) Inhalation every 6 hours PRN Shortness of Breath and/or Wheezing    ___________  Active diet:  Diet, DASH/TLC:   Sodium & Cholesterol Restricted  1500mL Fluid Restriction ([id number])     Special Instructions for Nursin milliLiter(s) to 2000 milliLiter(s) fluid restriction  ___________________    ==================>> VITAL SIGNS <<==================  Height (cm): 157.5  Weight (kg): 104.8  BMI (kg/m2): 42.2  Vital Signs Last 24 HrsT(C): 37.3 (02-15-24 @ 16:27)  T(F): 99.1 (02-15-24 @ 16:27), Max: 99.1 (02-15-24 @ 16:27)  HR: 80 (02-15-24 @ 16:27) (61 - 124)  BP: 107/69 (02-15-24 @ 16:27)  RR: 20 (02-15-24 @ 16:27) (19 - 20)  SpO2: 98% (02-15-24 @ 16:27) (94% - 99%)      CAPILLARY BLOOD GLUCOSE         ==================>> LAB AND IMAGING <<==================                        8.6    13.46 )-----------( 150      ( 15 Feb 2024 12:14 )             27.1        0215    140  |  109<H>  |  62<H>  ----------------------------<  107<H>  3.5   |  25  |  0.82    Ca    8.9      15 Feb 2024 12:14  Phos  4.0       Mg     1.6     02-15    TPro  6.0  /  Alb  1.9<L>  /  TBili  0.7  /  DBili  x   /  AST  35  /  ALT  25  /  AlkPhos  107  -15    WBC count:   13.46 <<== ,  13.31 <<== ,  14.23 <<== ,  17.51 <<==   Hemoglobin:   8.6 <<==,  8.7 <<==,  9.2 <<==,  10.0 <<==  platelets:  150 <==, 155 <==, 181 <==, 198 <==    Creatinine:  0.82  <<==, 1.46  <<==, 2.28  <<==, 2.98  <<==  Sodium:   140  <==, 138  <==, 136  <==, 134  <==       AST:          35(02-15) <== , 34() <== , 45() <== , 45() <== , 20() <==      ALT:        25(02-15)  <== , 28()  <== , 26()  <== , 27()  <== , 16()  <==      AP:        107(02-15)  <=, 107()  <=, 108()  <=, 101()  <=, 86()  <=     Bili:        0.7(02-15)  <=, 0.7()  <=, 0.7()  <=, 0.7()  <=, 1.0()  <=    ____________________________    M I C R O B I O L O G Y :    Culture - Blood (collected 2024 07:10)  Source: .Blood Blood-Heart  Preliminary Report (15 Feb 2024 14:01):    No growth at 48 Hours                < from: US Renal (24 @ 11:34) >  IMPRESSION:  No evidence for bilateral hydronephrosis. Bilateral renal cysts.  < end of copied text >    < from: US Duplex Venous Lower Ext Complete, Bilateral (24 @ 09:25) >  IMPRESSION:  Very technically limited study with no evidence of deep venous thrombosis   in the visualized bilateral lower extremity veins.  < end of copied text >    ___________________________________________________________________________________  ===============>>  A S S E S S M E N T   A N D   P L A N <<===============  ------------------------------------------------------------------------------------------    · Assessment	  A 73 year old female, from home, wheelchair-bound, w/ PMHx COPD on 3 L NC at home, Obesity, Hypothyroidism, Diastolic HF on Lasix, Breast cancer s/p surgery, Asthma, Chronic lymphedema, Kidney cancer s/p partial nephrectomy, presented to the ED complaining of rapidly progressive dyspnea at rest that started 24 to 48 hours ago. Since then, she has developed significant orthopnea, abdominal girth, and leg swelling from her baseline. Admitted to telemetry for Acute on Chronic Diastolic HF.   Overnight patient develop hypotension, Lasix held, will restart low dose and spread out regimen. Cardiology recommend Doppler negative for DVT. Recommending ECHO. Nephrology consulted.      Problem/Plan - 1:  ·  Problem: Sepsis.   ·  Plan: secondary to ESBL UTI  continue Meropenem  blood culture negative  afebrile.     Problem/Plan - 2:  ·  Problem: Acute on chronic diastolic heart failure.   Cardiology appreciated  continue diuretics  repeat echocardiogram pending  Monitor creatinine closely on diuretics  Monitor vitals, labs, intake and output  Wean off Oxygen as tolerated       Problem/Plan - 3:  ·  Problem: Elevated troponin.   Likely demand ischemia from tachycardia and heart failure and sepsis   Limited utility and repeat troponin given ALDAIR  No chest pain  Cardiology following     Problem/Plan - 4:  ·  Problem: Acute on Chronic Hypoxemic respiratory failure.     Chronic COPD  ·  Plan: Patient uses 2 to 3L NC  Continue Oxygen Therapy And wean down to baseline  Albuterol /  Spiriva   Rest as above.     Problem/Plan - 5:  ·  Problem: ALDAIR (acute kidney injury).   ·  Plan: Baseline Cr 0.6 to 0.7  Nephrology Consult and management appreciated  Sonogram as above: no hydronephrosis  trend creatinine  Avoid nephrotoxic medications.     Problem/Plan - 6:  ·  Problem: Prophylactic measure.   ·  Plan: DVT PPX: Heparin SQ.    --------------------------------------------  Case discussed with Patient, primary cardiologist/PMD, Nurse Practitioner   Education given on findings and plan of care  ___________________________  H. JANICE Welch.  Pager: [phone]       _________________________________________________________________________________________  ========>>  M E D I C A L   A T T E N D I N G    F O L L O W  U P  N O T E  <<=========  -----------------------------------------------------------------------------------------------------    - Patient seen and examined by me earlier today.     Noted events overnight, patient with new onset atrial fibrillation with periods of rapid heart rate, back and forth into sinus    Patient overall nine improved, feeling better in general and asking about going home!    ==================>> REVIEW OF SYSTEM <<=================    GEN: no fever, no chills, no pain  RESP:  SOB Improved, no cough, no sputum  CVS: no chest pain, no palpitations  GI: no abdominal pain, no nausea  : no dysuria, no frequency  Neuro: no headache, no dizziness    ==================>> PHYSICAL EXAM <<=================    GEN: A&O X 3 , NAD , comfortable, pleasant, calm in bed .. ( Patient at baseline bedbound/wheelchair-bound)  HEENT: NCAT, PERRL, MMM, hearing intact  CVS: S1S2 , regular , No M/R/G appreciated  PULM: CTA B/L,  limited exam due to body habitus.   ABD.: soft. non tender, non distended,  bowel sounds present, obese   Extrem: intact pulses , Chronic appearing lymphedema and chronic changes        ( Note written / Date of service 02-15-24 ( This is certified to be the same as "ENTERED" date above ( for billing purposes)))    ==================>> MEDICATIONS <<====================    aspirin enteric coated 81 milliGRAM(s) Oral daily  atorvastatin 40 milliGRAM(s) Oral at bedtime  busPIRone 10 milliGRAM(s) Oral two times a day  DULoxetine 60 milliGRAM(s) Oral daily  enoxaparin Injectable 100 milliGRAM(s) SubCutaneous every 12 hours  furosemide    Tablet 20 milliGRAM(s) Oral two times a day  gabapentin 100 milliGRAM(s) Oral two times a day  influenza  Vaccine (HIGH DOSE) 0.7 milliLiter(s) IntraMuscular once  levothyroxine 125 MICROGram(s) Oral daily  melatonin 3 milliGRAM(s) Oral at bedtime  meropenem  IVPB 500 milliGRAM(s) IV Intermittent every 12 hours  meropenem  IVPB      metoprolol tartrate 25 milliGRAM(s) Oral two times a day  multivitamin 1 Tablet(s) Oral daily  OLANZapine 10 milliGRAM(s) Oral at bedtime  tiotropium 2.5 MICROgram(s) Inhaler 2 Puff(s) Inhalation daily    MEDICATIONS  (PRN):  acetaminophen     Tablet .. 650 milliGRAM(s) Oral every 6 hours PRN Temp greater or equal to 38C (100.4F), Mild Pain (1 - 3)  albuterol    90 MICROgram(s) HFA Inhaler 2 Puff(s) Inhalation every 6 hours PRN Shortness of Breath and/or Wheezing    ___________  Active diet:  Diet, DASH/TLC:   Sodium & Cholesterol Restricted  1500mL Fluid Restriction ([id number])     Special Instructions for Nursin milliLiter(s) to 2000 milliLiter(s) fluid restriction  ___________________    ==================>> VITAL SIGNS <<==================    Height (cm): 157.5  Weight (kg): 104.8  BMI (kg/m2): 42.2  Vital Signs Last 24 HrsT(C): 37.3 (02-15-24 @ 16:27)  T(F): 99.1 (02-15-24 @ 16:27), Max: 99.1 (02-15-24 @ 16:27)  HR: 80 (02-15-24 @ 16:27) (61 - 124)  BP: 107/69 (02-15-24 @ 16:27)  RR: 20 (02-15-24 @ 16:27) (19 - 20)  SpO2: 98% (02-15-24 @ 16:27) (94% - 99%)       ==================>> LAB AND IMAGING <<==================                        8.6    13.46 )-----------( 150      ( 15 Feb 2024 12:14 )             27.1        02-15    140  |  109<H>  |  62<H>  ----------------------------<  107<H>  3.5   |  25  |  0.82    Ca    8.9      15 Feb 2024 12:14  Phos  4.0       Mg     1.6     02-15    TPro  6.0  /  Alb  1.9<L>  /  TBili  0.7  /  DBili  x   /  AST  35  /  ALT  25  /  AlkPhos  107  02-15    WBC count:   13.46 <<== ,  13.31 <<== ,  14.23 <<== ,  17.51 <<==   Hemoglobin:   8.6 <<==,  8.7 <<==,  9.2 <<==,  10.0 <<==  platelets:  150 <==, 155 <==, 181 <==, 198 <==    Creatinine:  0.82  <<==, 1.46  <<==, 2.28  <<==, 2.98  <<==  Sodium:   140  <==, 138  <==, 136  <==, 134  <==       AST:          35(02-15) <== , 34() <== , 45() <== , 45() <== , 20() <==      ALT:        25(02-15)  <== , 28()  <== , 26()  <== , 27()  <== , 16()  <==      AP:        107(02-15)  <=, 107()  <=, 108()  <=, 101()  <=, 86()  <=     Bili:        0.7(02-15)  <=, 0.7()  <=, 0.7()  <=, 0.7()  <=, 1.0()  <=    ____________________________    M I C R O B I O L O G Y :    Culture - Blood (collected 2024 07:10)  Source: .Blood Blood-Heart  Preliminary Report (15 Feb 2024 14:01):    No growth at 48 Hours      < from: US Renal (24 @ 11:34) >  IMPRESSION:  No evidence for bilateral hydronephrosis. Bilateral renal cysts.  < end of copied text >    < from: US Duplex Venous Lower Ext Complete, Bilateral (24 @ 09:25) >  IMPRESSION:  Very technically limited study with no evidence of deep venous thrombosis   in the visualized bilateral lower extremity veins.  < end of copied text >    ___________________________________________________________________________________  ===============>>  A S S E S S M E N T   A N D   P L A N <<===============  ------------------------------------------------------------------------------------------    · Assessment	  A 73 year old female, from home, wheelchair-bound, w/ PMHx COPD on 3 L NC at home, Obesity, Hypothyroidism, Diastolic HF on Lasix, Breast cancer s/p surgery, Asthma, Chronic lymphedema, Kidney cancer s/p partial nephrectomy, presented to the ED complaining of rapidly progressive dyspnea at rest that started 24 to 48 hours ago. Since then, she has developed significant orthopnea, abdominal girth, and leg swelling from her baseline. Admitted to telemetry for Acute on Chronic Diastolic HF.   Overnight patient develop hypotension, Lasix held, will restart low dose and spread out regimen. Cardiology recommend Doppler negative for DVT. Recommending ECHO. Nephrology consulted.      Problem/Plan - :  ·  Problem: New onset atrial fibrillation    Rapid heart rate as noted above    overall rate better controlled now    patient on the full dose of Lovenox    as per patient's  at bedside, patient Used to be on Blood thinners in the past, however it was Stopped for unknown reasons ( My review of electronic medical records, did not Fine Any evidence of prior AF)    Cardiology appreciated    continue medical management as noted    EP Eval as needed     Problem/Plan - :  ·  Problem: Sepsis.     Maybe from urinary infection    Patient finishing meropenem tomorrow    it appears no urine culture was sent?     Problem/Plan - :  ·  Problem: Acute on chronic diastolic heart failure.   Cardiology appreciated  continue diuretics  repeat echocardiogram   Monitor creatinine closely on diuretics  Monitor vitals, labs, intake and output  Wean down Oxygen To baseline as tolerated       Problem/Plan - :  ·  Problem: Elevated troponin.   Likely demand ischemia from tachycardia and heart failure and sepsis   Limited utility and repeat troponin given ALDAIR  No chest pain  Cardiology following     Problem/Plan - :  ·  Problem: Acute on Chronic Hypoxemic respiratory failure.     Chronic COPD  ·  Plan: Patient uses 2 to 3L NC  Continue Oxygen Therapy And wean down to baseline  Albuterol /  Spiriva   Rest as above.     Problem/Plan - :  ·  Problem: ALDAIR :: Resolved  ·  Plan: Baseline Cr 0.6 to 0.7  Nephrology Consult and management appreciated  Sonogram as above: no hydronephrosis  trend creatinine  Creatinine:  0.82  <<==, 1.46  <<==, 2.28  <<==, 2.98  <<==     Problem/Plan - :  ·  Problem: Prophylactic measure.   ·  Plan: DVT PPX: Heparin SQ.    --------------------------------------------  Case discussed with Patient, , Nurse Practitioner   Education given on findings and plan of care  ___________________________  DIANE Welch D.O.  Pager: [phone]

## 2024-02-15 NOTE — CHART NOTE - NSCHARTNOTEFT_GEN_A_CORE
Contacted Dr. Welch this morning via phone to communicate events from last night for patient. Per RN, pt continues to refuse blood work and peripheral IV this morning. Dayshift NP will follow.

## 2024-02-15 NOTE — PROGRESS NOTE ADULT - ASSESSMENT
Patient is a 74yo Obese Female wheelchair bound with COPD on 3 L NC at home, hypothyroid, diastolic CHF on Lasix, HTN on Losartan, breast cancer s/p surgery, asthma, chronic lymphedema, Left kidney cancer  s/p partial nephrectomy, p/w SOB and worsening LE edema. Pt a/w acute on chronic diastolic HF and ALDAIR. Nephrology consulted for Elevated serum creatinine.    1. ALDAIR- last SCr 0.6-0.7. ALDAIR likely hemodynamically mediated in the setting of hypotension with acute on chronic dHF. UA active in the setting of infection. FeUrea 29%; low EABV. Renal function improving with diuresis  Albumin 1.9; Spot Upr/Cr 1.5; non nephrotic range.  Renal US with no hydro but b/l renal cyst with rt cyst 5.6 cm with septations; recc outpt Urology f/u. . Strict I/Os. Avoid nephrotoxins/ NSAIDs/ RCA. Monitor BMP.  2. Essential HTN- BP low normal, pt on Metoprolol for tachycardia. Plan as per Cards. Monitor BP.   3. LE edema- Lasix IV changed to 20mg PO bid due to relative hypotension.  Daily weights. Strict I/Os. Dopplers  - limited studies; no  DVTs seen  If worsening edema; can consider albumin assisted diuresis  4. Acute on chronic Diastolic HF- plan as per Cards/ primary team      [facility] NEPHROLOGY  Hernandez Forbes M.D.  Damion Dumont D.O.  Sydney Emanuel M.D.  MD Yeimy Tello, MSN, ANP-C    Telephone: [phone]  Facsimile: [phone] [place] [address], [address]  [place] [address]

## 2024-02-15 NOTE — PROGRESS NOTE ADULT - ASSESSMENT
73 year old female, from home, wheelchair-bound, w/ PMHx COPD on 3 L NC at home, Obesity, Hypothyroidism, Diastolic HF on Lasix, Breast cancer s/p surgery, Asthma, Chronic lymphedema, Kidney cancer s/p partial nephrectomy, presented to the ED complaining of rapidly progressive dyspnea at rest and she has developed significant orthopnea, abdominal girth, and leg swelling from her baseline. Admitted to telemetry for Acute on Chronic Diastolic HF.   Cardiology consulted, LE US negative for dvt.   Alos found to have ALDAIR on admission Nephrology consulted.   Hospital course complicated with episodes of Afib with RVR in 130-140's, started on Metoprolol, Cardio Dr. Quezada consulted

## 2024-02-15 NOTE — PROGRESS NOTE ADULT - SUBJECTIVE AND OBJECTIVE BOX
Patient is a 73y old  Female who presents with a chief complaint of Acute on Chronic Diastolic HF (15 Feb 2024 13:16)    OVERNIGHT EVENTS: episodes of Afib with RVR in 130-140's this AM, pt asymptomatic     REVIEW OF SYSTEMS:  CONSTITUTIONAL: No fever, chills  RESPIRATORY: No cough, SOB  CARDIOVASCULAR: No chest pain, palpitations  GASTROINTESTINAL: No abdominal pain  GENITOURINARY: No dysuria  NEUROLOGICAL: No HA  MUSCULOSKELETAL: No joint pain or swelling      T(C): 36.4 (02-15-24 @ 12:06), Max: 36.9 (02-15-24 @ 08:22)  HR: 124 (02-15-24 @ 12:06) (61 - 124)  BP: 110/62 (02-15-24 @ 12:06) (97/65 - 118/100)  RR: 20 (02-15-24 @ 12:06) (18 - 20)  SpO2: 99% (02-15-24 @ 12:06) (94% - 99%)  Wt(kg): --Vital Signs Last 24 Hrs  T(C): 36.4 (15 Feb 2024 12:06), Max: 36.9 (15 Feb 2024 08:22)  T(F): 97.5 (15 Feb 2024 12:06), Max: 98.4 (15 Feb 2024 08:22)  HR: 124 (15 Feb 2024 12:06) (61 - 124)  BP: 110/62 (15 Feb 2024 12:06) (97/65 - 118/100)  BP(mean): 106 (14 Feb 2024 20:06) (65 - 106)  RR: 20 (15 Feb 2024 12:06) (18 - 20)  SpO2: 99% (15 Feb 2024 12:06) (94% - 99%)    Parameters below as of 15 Feb 2024 12:06  Patient On (Oxygen Delivery Method): nasal cannula  O2 Flow (L/min): 3    MEDICATIONS  (STANDING):  aspirin enteric coated 81 milliGRAM(s) Oral daily  atorvastatin 40 milliGRAM(s) Oral at bedtime  busPIRone 10 milliGRAM(s) Oral two times a day  DULoxetine 60 milliGRAM(s) Oral daily  furosemide    Tablet 20 milliGRAM(s) Oral two times a day  gabapentin 100 milliGRAM(s) Oral two times a day  heparin   Injectable 5000 Unit(s) SubCutaneous every 12 hours  influenza  Vaccine (HIGH DOSE) 0.7 milliLiter(s) IntraMuscular once  levothyroxine 125 MICROGram(s) Oral daily  melatonin 3 milliGRAM(s) Oral at bedtime  meropenem  IVPB 500 milliGRAM(s) IV Intermittent every 12 hours  meropenem  IVPB      metoprolol tartrate 25 milliGRAM(s) Oral two times a day  multivitamin 1 Tablet(s) Oral daily  OLANZapine 10 milliGRAM(s) Oral at bedtime  tiotropium 2.5 MICROgram(s) Inhaler 2 Puff(s) Inhalation daily    MEDICATIONS  (PRN):  acetaminophen     Tablet .. 650 milliGRAM(s) Oral every 6 hours PRN Temp greater or equal to 38C (100.4F), Mild Pain (1 - 3)  albuterol    90 MICROgram(s) HFA Inhaler 2 Puff(s) Inhalation every 6 hours PRN Shortness of Breath and/or Wheezing      PHYSICAL EXAM:  GENERAL: NAD  EYES: clear conjunctiva   ENMT: Moist mucous membranes  NECK: Supple, No JVD  CHEST/LUNG: Clear to auscultation bilaterally; No rales, rhonchi, wheezing, or rubs  HEART: S1, S2, Regular rate and rhythm  ABDOMEN: Soft, Nontender, Nondistended; Bowel sounds present  NEURO: Alert & Oriented X3  EXTREMITIES: 3+ LE edema chronic lymphedema   SKIN: diffuse ecchymosis b/l upper arms     Consultant(s) Notes Reviewed:  [x ] YES  [ ] NO  Care Discussed with Consultants/Other Providers [ x] YES  [ ] NO    LABS:                        8.6    13.46 )-----------( 150      ( 15 Feb 2024 12:14 )             27.1     02-15    140  |  109<H>  |  62<H>  ----------------------------<  107<H>  3.5   |  25  |  0.82    Ca    8.9      15 Feb 2024 12:14  Phos  4.0     02-14  Mg     1.6     02-15    TPro  6.0  /  Alb  1.9<L>  /  TBili  0.7  /  DBili  x   /  AST  35  /  ALT  25  /  AlkPhos  107  02-15    PT/INR - ( 14 Feb 2024 01:48 )   PT: 13.2 sec;   INR: 1.16 ratio      PTT - ( 14 Feb 2024 01:48 )  PTT:29.7 sec  CAPILLARY BLOOD GLUCOSE    Urinalysis Basic - ( 15 Feb 2024 12:14 )  Color: x / Appearance: x / SG: x / pH: x  Gluc: 107 mg/dL / Ketone: x  / Bili: x / Urobili: x   Blood: x / Protein: x / Nitrite: x   Leuk Esterase: x / RBC: x / WBC x   Sq Epi: x / Non Sq Epi: x / Bacteria: x    RADIOLOGY & ADDITIONAL TESTS:  < from: US Renal (02.14.24 @ 11:34) >    ACC: [id number] EXAM:  US KIDNEY(S)   ORDERED BY: ELENA MOORE     PROCEDURE DATE:  02/14/2024          INTERPRETATION:  CLINICAL INFORMATION: Acute renal insufficiency.   Evaluate for urinary retention.    COMPARISON: None available.    TECHNIQUE: Sonography of the kidneys and bladder. The sonographer noted   that the evaluation was technically difficult related to patient body   habitus.    FINDINGS:  Right kidney: 11.4 cm. No hydronephrosis or calculi. 5.6 x 5.4 cm   septated cyst lower pole region.    Left kidney: 10.7 cm. No hydronephrosis or calculi. 3.7 x 3.0 cm cyst   lower pole region.    Urinary bladder: Visualized portions appear unremarkable.    IMPRESSION:  No evidence for bilateral hydronephrosis. Bilateral renal cysts.      < end of copied text >

## 2024-02-16 RX ORDER — APIXABAN 2.5 MG/1
1 TABLET, FILM COATED ORAL
Qty: 60 | Refills: 0
Start: 2024-02-16 | End: 2024-03-16

## 2024-02-16 RX ORDER — FUROSEMIDE 40 MG
1 TABLET ORAL
Refills: 0 | DISCHARGE

## 2024-02-16 RX ORDER — LANOLIN ALCOHOL/MO/W.PET/CERES
1 CREAM (GRAM) TOPICAL
Refills: 0 | DISCHARGE

## 2024-02-16 RX ORDER — ERTAPENEM SODIUM 1 G/1
1000 INJECTION, POWDER, LYOPHILIZED, FOR SOLUTION INTRAMUSCULAR; INTRAVENOUS EVERY 24 HOURS
Refills: 0 | Status: COMPLETED | OUTPATIENT
Start: 2024-02-16 | End: 2024-02-16

## 2024-02-16 RX ADMIN — GABAPENTIN 100 MILLIGRAM(S): 400 CAPSULE ORAL at 17:18

## 2024-02-16 RX ADMIN — Medication 20 MILLIGRAM(S): at 05:23

## 2024-02-16 RX ADMIN — Medication 20 MILLIGRAM(S): at 13:55

## 2024-02-16 RX ADMIN — Medication 25 MILLIGRAM(S): at 05:24

## 2024-02-16 RX ADMIN — Medication 10 MILLIGRAM(S): at 05:23

## 2024-02-16 RX ADMIN — GABAPENTIN 100 MILLIGRAM(S): 400 CAPSULE ORAL at 05:24

## 2024-02-16 RX ADMIN — TIOTROPIUM BROMIDE 2 PUFF(S): 18 CAPSULE ORAL; RESPIRATORY (INHALATION) at 13:05

## 2024-02-16 RX ADMIN — ENOXAPARIN SODIUM 100 MILLIGRAM(S): 100 INJECTION SUBCUTANEOUS at 17:18

## 2024-02-16 RX ADMIN — Medication 1 TABLET(S): at 11:51

## 2024-02-16 RX ADMIN — OLANZAPINE 10 MILLIGRAM(S): 15 TABLET, FILM COATED ORAL at 21:33

## 2024-02-16 RX ADMIN — Medication 25 MILLIGRAM(S): at 17:18

## 2024-02-16 RX ADMIN — Medication 81 MILLIGRAM(S): at 11:51

## 2024-02-16 RX ADMIN — DULOXETINE HYDROCHLORIDE 60 MILLIGRAM(S): 30 CAPSULE, DELAYED RELEASE ORAL at 11:51

## 2024-02-16 RX ADMIN — Medication 10 MILLIGRAM(S): at 17:18

## 2024-02-16 RX ADMIN — MEROPENEM 100 MILLIGRAM(S): 1 INJECTION INTRAVENOUS at 05:23

## 2024-02-16 RX ADMIN — ATORVASTATIN CALCIUM 40 MILLIGRAM(S): 80 TABLET, FILM COATED ORAL at 21:33

## 2024-02-16 RX ADMIN — Medication 125 MICROGRAM(S): at 05:23

## 2024-02-16 RX ADMIN — ERTAPENEM SODIUM 120 MILLIGRAM(S): 1 INJECTION, POWDER, LYOPHILIZED, FOR SOLUTION INTRAMUSCULAR; INTRAVENOUS at 11:51

## 2024-02-16 RX ADMIN — Medication 3 MILLIGRAM(S): at 21:33

## 2024-02-16 RX ADMIN — ENOXAPARIN SODIUM 100 MILLIGRAM(S): 100 INJECTION SUBCUTANEOUS at 05:24

## 2024-02-16 NOTE — DIETITIAN INITIAL EVALUATION ADULT - REASON INDICATOR FOR ASSESSMENT
Nutrition consult requested for pressure injury, decompensated heart failure, salt and fluid restriction. "Star" pt

## 2024-02-16 NOTE — DIETITIAN INITIAL EVALUATION ADULT - OTHER INFO
Pt lives home with family PTA, asleep when visited, confused per chart; Poor/varied intake, depending on mood; no swallowing problem, GI distress at present per RN;  Pt lives home with family PTA, recent admission with RD Assessment/Education 2/1/24; asleep when visited, confused per chart; Varied intake depending on mood, refusing blood work at time, no swallowing problem, GI distress at present per RN; Allergy to Peaches, shellfish, grapes; CupN5N=0.4, no h/o DM, pt not a good candidate for diet education at present

## 2024-02-16 NOTE — PROGRESS NOTE ADULT - ASSESSMENT
73 year old female, from home, wheelchair-bound, w/ PMHx COPD on 3 L NC at home, Obesity, Hypothyroidism, Diastolic HF on Lasix, Breast cancer s/p surgery, Asthma, Chronic lymphedema, Kidney cancer s/p partial nephrectomy, presented to the ED complaining of rapidly progressive dyspnea at rest and she has developed significant orthopnea, abdominal girth, and leg swelling from her baseline. Admitted to telemetry for Acute on Chronic Diastolic HF.   Cardiology consulted, LE US negative for dvt.   Alos found to have ALDAIR on admission Nephrology consulted.   Hospital course complicated with episodes of Afib with RVR in 130-140's, started on Metoprolol, Cardio Dr. Quezada consulted   Pt converted to NSR. Echo with no acute findings

## 2024-02-16 NOTE — PHARMACOTHERAPY INTERVENTION NOTE - COMMENTS
Weight check
Temporarily; until ALDAIR resolves
Patient currently on meropenem 500 mg every 12 hours for treatment of urinary tract infection.    Suggest to switch to ertapenem 1 g IVPB every 24 hours x 1 dose to complete course.

## 2024-02-16 NOTE — DIETITIAN INITIAL EVALUATION ADULT - PERTINENT LABORATORY DATA
02-15    140  |  109<H>  |  62<H>  ----------------------------<  107<H>  3.5   |  25  |  0.82    Ca    8.9      15 Feb 2024 12:14  Phos  4.0     02-14  Mg     1.6     02-15    TPro  6.0  /  Alb  1.9<L>  /  TBili  0.7  /  DBili  x   /  AST  35  /  ALT  25  /  AlkPhos  107  02-15  A1C with Estimated Average Glucose Result: 6.4 % (02-14-24 @ 01:48)

## 2024-02-16 NOTE — DIETITIAN INITIAL EVALUATION ADULT - PROBLEM SELECTOR PLAN 5
Not in exacerbation  Patient uses 2 to 3L NC  Continue Oxygen Therapy   Will continue Albuterol   Will continue Spiriva   Rest as above

## 2024-02-16 NOTE — PROGRESS NOTE ADULT - ASSESSMENT
_________________________________________________________________________________________  ========>>  M E D I C A L   A T T E N D I N G    F O L L O W  U P  N O T E  <<=========  -----------------------------------------------------------------------------------------------------    - Patient seen and examined by me earlier today.     patient mostly in sinus..    no major events reported / noted otherwise     patient noted a little more disoriented ( had just woken up )     ==================>> REVIEW OF SYSTEM <<=================    GEN: no fever, no chills, no pain  RESP:  SOB Improved, no cough, no sputum  CVS: no chest pain, no palpitations  GI: no abdominal pain, no nausea  : no dysuria, no frequency  Neuro: no headache, no dizziness    ==================>> PHYSICAL EXAM <<=================    GEN: A&O X 2 , NAD , comfortable, pleasant, calm in bed .. ( Patient at baseline bedbound/wheelchair-bound)  HEENT: NCAT, PERRL, MMM, hearing intact  CVS: S1S2 , regular , No M/R/G appreciated  PULM: CTA B/L,  limited exam due to body habitus.   ABD.: soft. non tender, non distended,  bowel sounds present, obese   Extrem: intact pulses , Chronic appearing lymphedema and chronic changes       ( Note written / Date of service 24 ( This is certified to be the same as "ENTERED" date above ( for billing purposes)))    ==================>> MEDICATIONS <<====================    aspirin enteric coated 81 milliGRAM(s) Oral daily  atorvastatin 40 milliGRAM(s) Oral at bedtime  busPIRone 10 milliGRAM(s) Oral two times a day  DULoxetine 60 milliGRAM(s) Oral daily  enoxaparin Injectable 100 milliGRAM(s) SubCutaneous every 12 hours  furosemide    Tablet 20 milliGRAM(s) Oral two times a day  gabapentin 100 milliGRAM(s) Oral two times a day  influenza  Vaccine (HIGH DOSE) 0.7 milliLiter(s) IntraMuscular once  levothyroxine 125 MICROGram(s) Oral daily  melatonin 3 milliGRAM(s) Oral at bedtime  metoprolol tartrate 25 milliGRAM(s) Oral two times a day  multivitamin 1 Tablet(s) Oral daily  OLANZapine 10 milliGRAM(s) Oral at bedtime  tiotropium 2.5 MICROgram(s) Inhaler 2 Puff(s) Inhalation daily    MEDICATIONS  (PRN):  acetaminophen     Tablet .. 650 milliGRAM(s) Oral every 6 hours PRN Temp greater or equal to 38C (100.4F), Mild Pain (1 - 3)  albuterol    90 MICROgram(s) HFA Inhaler 2 Puff(s) Inhalation every 6 hours PRN Shortness of Breath and/or Wheezing    ___________  Active diet:  Diet, DASH/TLC:   Sodium & Cholesterol Restricted  1500mL Fluid Restriction ([id number])     Special Instructions for Nursin milliLiter(s) to 2000 milliLiter(s) fluid restriction  ___________________    ==================>> VITAL SIGNS <<==================    Weight (kg): 104.8  Vital Signs Last 24 HrsT(C): 36.2 (24 @ 15:58)  T(F): 97.2 (24 @ 15:58), Max: 99.1 (02-15-24 @ 16:27)  HR: 91 (24 @ 15:58) (75 - 91)  BP: 136/96 (24 @ 15:58)  RR: 19 (24 @ 15:58) (19 - 20)  SpO2: 99% (24 @ 15:58) (95% - 100%)       ==================>> LAB AND IMAGING <<==================                        8.6    13.46 )-----------( 150      ( 15 Feb 2024 12:14 )             27.1        02-15    140  |  109<H>  |  62<H>  ----------------------------<  107<H>  3.5   |  25  |  0.82    Ca    8.9      15 Feb 2024 12:14  Phos  4.0       Mg     1.6     02-15    TPro  6.0  /  Alb  1.9<L>  /  TBili  0.7  /  DBili  x   /  AST  35  /  ALT  25  /  AlkPhos  107  15    WBC count:   13.46 <<== ,  13.31 <<== ,  14.23 <<== ,  17.51 <<==   Hemoglobin:   8.6 <<==,  8.7 <<==,  9.2 <<==,  10.0 <<==  platelets:  150 <==, 155 <==, 181 <==, 198 <==    Creatinine:  0.82  <<==, 1.46  <<==, 2.28  <<==, 2.98  <<==  Sodium:   140  <==, 138  <==, 136  <==, 134  <==       AST:          35(02-15) <== , 34() <== , 45() <== , 45() <==      ALT:        25(02-15)  <== , 28()  <== , 26()  <== , 27()  <==      AP:        107(02-15)  <=, 107()  <=, 108()  <=, 101()  <=     Bili:        0.7(02-15)  <=, 0.7()  <=, 0.7()  <=, 0.7()  <=    ____________________________    M I C R O B I O L O G Y :    Culture - Blood (collected 2024 07:10)  Source: .Blood Blood-Heart  Preliminary Report (2024 14:01):    No growth at 72 Hours      < from: US Renal (24 @ 11:34) >  IMPRESSION:  No evidence for bilateral hydronephrosis. Bilateral renal cysts.  < end of copied text >    < from: US Duplex Venous Lower Ext Complete, Bilateral (24 @ 09:25) >  IMPRESSION:  Very technically limited study with no evidence of deep venous thrombosis   in the visualized bilateral lower extremity veins.  < end of copied text >    ___________________________________________________________________________________  ===============>>  A S S E S S M E N T   A N D   P L A N <<===============  ------------------------------------------------------------------------------------------    · Assessment	  A 73 year old female, from home, wheelchair-bound, w/ PMHx COPD on 3 L NC at home, Obesity, Hypothyroidism, Diastolic HF on Lasix, Breast cancer s/p surgery, Asthma, Chronic lymphedema, Kidney cancer s/p partial nephrectomy, presented to the ED complaining of rapidly progressive dyspnea at rest that started 24 to 48 hours ago. Since then, she has developed significant orthopnea, abdominal girth, and leg swelling from her baseline. Admitted to telemetry for Acute on Chronic Diastolic HF.   Overnight patient develop hypotension, Lasix held, will restart low dose and spread out regimen. Cardiology recommend Doppler negative for DVT. Recommending ECHO. Nephrology consulted.      Problem/Plan - :  ·  Problem: New onset atrial fibrillation    Rapid heart rate as noted above    overall rate better controlled now    patient on the full dose of Lovenox    as per patient's  at bedside, patient Used to be on Blood thinners in the past, however it was Stopped for unknown reasons ( My review of electronic medical records, did not Fine Any evidence of prior AF)    Cardiology appreciated    continue medical management as noted     start Eliquis      monitor on tele     Problem/Plan - :  ·  Problem: Sepsis.     Maybe from urinary infection    finish meropenem and DC     monitor      Problem/Plan - :  ·  Problem: Acute on chronic diastolic heart failure.   Cardiology appreciated  continue diuretics  repeat echocardiogram   Monitor creatinine closely on diuretics  Monitor vitals, labs, intake and output  Wean down Oxygen To baseline as tolerated       Problem/Plan - :  ·  Problem: Elevated troponin.   Likely demand ischemia from tachycardia and heart failure and sepsis   Limited utility and repeat troponin given ALDAIR  No chest pain  Cardiology following     Problem/Plan - :  ·  Problem: Acute on Chronic Hypoxemic respiratory failure.     Chronic COPD  ·  Plan: Patient uses 2 to 3L NC  Continue Oxygen Therapy And wean down to baseline  Albuterol /  Spiriva   Rest as above.     Problem/Plan - :  ·  Problem: ALDAIR :: Resolved  ·  Plan: Baseline Cr 0.6 to 0.7  Nephrology Consult and management appreciated  Sonogram as above: no hydronephrosis  trend creatinine  Creatinine:  0.82  <<==, 1.46  <<==, 2.28  <<==, 2.98  <<==     Problem/Plan - :  ·  Problem: Prophylactic measure.   ·  Plan: DVT PPX: Heparin SQ.    DC planing Sun / Mon ?     needs aid services resumed..     --------------------------------------------  Case discussed with Patient, Nurse Practitioner   Education given on findings and plan of care  ___________________________  H. JANICE Welch.  Pager: [phone]

## 2024-02-16 NOTE — PROGRESS NOTE ADULT - PROBLEM SELECTOR PLAN 2
-not in exacerbation  -cont Lasix, ASA, BB and Statin   -rest plan as above
-not in exacerbation  -cont Lasix, ASA, BB and Statin   -rest plan as above
not in exacerbation- possible to sepsis f/u with cardiology  NYHA Class IV  switched Lasix 40mg IVP daily to 20mg PO BID due to hypotension  Elevated BNP. Kidney failure, older age, and female sex all increase BNP. Obesity reduces BNP.   Recent TTE noted in 1/2024 noted. Cardiology repeat ECHO  Remote Tele. Daily Weights, Strict I & Os. Vitals q4hr   DASH/TLC diet.1500 mL Fluid restriction    Wean off Oxygen as tolerated    Cardio Dr Quezada  Avoid NSAIDs or thiazolidinediones. Dietitian consult

## 2024-02-16 NOTE — PROGRESS NOTE ADULT - PROBLEM SELECTOR PLAN 5
-plan as above
-plan as above
Not in exacerbation  Patient uses 2 to 3L NC  Continue Oxygen Therapy   Will continue Albuterol   Will continue Spiriva   Rest as above

## 2024-02-16 NOTE — PROGRESS NOTE ADULT - SUBJECTIVE AND OBJECTIVE BOX
C A R D I O L O G Y  **********************************     DATE OF SERVICE: 02-16-24    Patient denies chest pain breathing better   Review of symptoms otherwise negative.    acetaminophen     Tablet .. 650 milliGRAM(s) Oral every 6 hours PRN  albuterol    90 MICROgram(s) HFA Inhaler 2 Puff(s) Inhalation every 6 hours PRN  aspirin enteric coated 81 milliGRAM(s) Oral daily  atorvastatin 40 milliGRAM(s) Oral at bedtime  busPIRone 10 milliGRAM(s) Oral two times a day  DULoxetine 60 milliGRAM(s) Oral daily  enoxaparin Injectable 100 milliGRAM(s) SubCutaneous every 12 hours  furosemide    Tablet 20 milliGRAM(s) Oral two times a day  gabapentin 100 milliGRAM(s) Oral two times a day  influenza  Vaccine (HIGH DOSE) 0.7 milliLiter(s) IntraMuscular once  levothyroxine 125 MICROGram(s) Oral daily  melatonin 3 milliGRAM(s) Oral at bedtime  metoprolol tartrate 25 milliGRAM(s) Oral two times a day  multivitamin 1 Tablet(s) Oral daily  OLANZapine 10 milliGRAM(s) Oral at bedtime  tiotropium 2.5 MICROgram(s) Inhaler 2 Puff(s) Inhalation daily                            8.6    13.46 )-----------( 150      ( 15 Feb 2024 12:14 )             27.1       Hemoglobin: 8.6 g/dL (02-15 @ 12:14)  Hemoglobin: 8.7 g/dL (02-14 @ 17:55)  Hemoglobin: 9.2 g/dL (02-14 @ 01:48)  Hemoglobin: 10.0 g/dL (02-13 @ 07:10)      02-15    140  |  109<H>  |  62<H>  ----------------------------<  107<H>  3.5   |  25  |  0.82    Ca    8.9      15 Feb 2024 12:14  Phos  4.0     02-14  Mg     1.6     02-15    TPro  6.0  /  Alb  1.9<L>  /  TBili  0.7  /  DBili  x   /  AST  35  /  ALT  25  /  AlkPhos  107  02-15    Creatinine Trend: 0.82<--, 1.46<--, 2.28<--, 2.98<--, 0.66<--, 0.77<--    COAGS:           T(C): 36.9 (02-16-24 @ 11:08), Max: 37.3 (02-15-24 @ 16:27)  HR: 86 (02-16-24 @ 11:08) (75 - 86)  BP: 128/71 (02-16-24 @ 11:08) (101/71 - 133/82)  RR: 20 (02-16-24 @ 11:08) (19 - 20)  SpO2: 98% (02-16-24 @ 11:08) (95% - 100%)  Wt(kg): --    I&O's Summary    15 Feb 2024 07:01  -  16 Feb 2024 07:00  --------------------------------------------------------  IN: 0 mL / OUT: 500 mL / NET: -500 mL      HEENT:  (-)icterus (-)pallor  CV: N S1 S2 1/6 MANAN (+)2 Pulses B/l  Resp:  Clear to ausculatation B/L, normal effort  GI: (+) BS Soft, NT, ND  Lymph:  (+)Edema, (-)obvious lymphadenopathy  Skin: Warm to touch, Normal turgor  Psych: Appropriate mood and affect      TELEMETRY: 	  afib        ASSESSMENT/PLAN: 	73y  Female , wheelchair-bound, w/ PMHx COPD on 3 L NC at home, Obesity, Hypothyroidism, Diastolic HF on Lasix, Normal lV and RV fx 1/24 at [facility] follows with Dr. Lozoya, Breast cancer s/p surgery, Asthma, Chronic lymphedema, Kidney cancer s/p partial nephrectomy, presented to the ED complaining of rapidly progressive dyspnea at rest that started 24 to 48 hours ago    # SOB  - Significantly elevated BNP since January however repeat echo without pertinent findings   - Left atrium is normal in size which would make significant diastolic syfx unlikely      # Afib  - New onset afib  - change lopressor to 50 mg PO QD, spontaneously converted yesterday   - on Theraputic Lovenox   - transition to Novel AC     # Edema  - low albumin ? third spacng  - ? nephrotic syndrome or protein loosing process  - check prealbumin  - lower extremity dopplers (-)    # Renal failure  - renal f/u    Thanh Quezada MD, [facility]  BEEPER [phone]

## 2024-02-16 NOTE — DIETITIAN NUTRITION RISK NOTIFICATION - TREATMENT: THE FOLLOWING DIET HAS BEEN RECOMMENDED
Diet, DASH/TLC:   Sodium & Cholesterol Restricted  1500mL Fluid Restriction ([id number])     Special Instructions for Nursin milliLiter(s) to 2000 milliLiter(s) fluid restriction (24 @ 17:26) [Active]

## 2024-02-16 NOTE — DIETITIAN INITIAL EVALUATION ADULT - PERTINENT MEDS FT
MEDICATIONS  (STANDING):  aspirin enteric coated 81 milliGRAM(s) Oral daily  atorvastatin 40 milliGRAM(s) Oral at bedtime  busPIRone 10 milliGRAM(s) Oral two times a day  DULoxetine 60 milliGRAM(s) Oral daily  enoxaparin Injectable 100 milliGRAM(s) SubCutaneous every 12 hours  ertapenem  IVPB 1000 milliGRAM(s) IV Intermittent every 24 hours  furosemide    Tablet 20 milliGRAM(s) Oral two times a day  gabapentin 100 milliGRAM(s) Oral two times a day  influenza  Vaccine (HIGH DOSE) 0.7 milliLiter(s) IntraMuscular once  levothyroxine 125 MICROGram(s) Oral daily  melatonin 3 milliGRAM(s) Oral at bedtime  metoprolol tartrate 25 milliGRAM(s) Oral two times a day  multivitamin 1 Tablet(s) Oral daily  OLANZapine 10 milliGRAM(s) Oral at bedtime  tiotropium 2.5 MICROgram(s) Inhaler 2 Puff(s) Inhalation daily    MEDICATIONS  (PRN):  acetaminophen     Tablet .. 650 milliGRAM(s) Oral every 6 hours PRN Temp greater or equal to 38C (100.4F), Mild Pain (1 - 3)  albuterol    90 MICROgram(s) HFA Inhaler 2 Puff(s) Inhalation every 6 hours PRN Shortness of Breath and/or Wheezing

## 2024-02-16 NOTE — PROGRESS NOTE ADULT - PROBLEM SELECTOR PROBLEM 4
Chronic obstructive pulmonary disease (COPD)
Chronic obstructive pulmonary disease (COPD)
Chronic respiratory failure

## 2024-02-16 NOTE — DIETITIAN INITIAL EVALUATION ADULT - FACTORS AFF FOOD INTAKE
acute on chronic comorbidities including CHF, SIRS, morbid obesity, sleep apnea/change in mental status/Shinto/ethnic/cultural/personal food preferences

## 2024-02-16 NOTE — DIETITIAN INITIAL EVALUATION ADULT - NSFNSPHYEXAMSKINFT_GEN_A_CORE
Pressure Injury 1: none, none  Pressure Injury 2: none, none  Pressure Injury 3: none, none  Pressure Injury 4: Bilateral:, heel, Stage I  Pressure Injury 5: none, none  Pressure Injury 6: none, none  Pressure Injury 7: none, none  Pressure Injury 8: none, none  Pressure Injury 9: none, none  Pressure Injury 10: none, none  Pressure Injury 11: none, none, Pressure Injury 1: none, none  Pressure Injury 2: none, none  Pressure Injury 3: none, none  Pressure Injury 4: Bilateral:, heel, Stage I  Pressure Injury 5: none, none  Pressure Injury 6: none, none  Pressure Injury 7: none, none  Pressure Injury 8: none, none  Pressure Injury 9: none, none  Pressure Injury 10: none, none  Pressure Injury 11: none, none, Pressure Injury 1: none, none  Pressure Injury 2: none, none  Pressure Injury 3: none, none  Pressure Injury 4: Bilateral:, heel, Stage I  Pressure Injury 5: none, none  Pressure Injury 6: none, none  Pressure Injury 7: none, none  Pressure Injury 8: none, none  Pressure Injury 9: none, none  Pressure Injury 10: none, none  Pressure Injury 11: none, none Pressure Injury : Bilateral:, heel, Stage I

## 2024-02-16 NOTE — DIETITIAN INITIAL EVALUATION ADULT - PROBLEM SELECTOR PLAN 1
s/p Lasix 40 mg in the ED  Likely secondary to increase water intake at home   NYHA Class IV  Will continue Lasix 40 mg IV qd w/ parameters for now since BP has been running soft.  Monitor electrolytes. EKG showed Sinus Tachycardia    Elevated BNP. Kidney failure, older age, and female sex all increase BNP. Obesity reduces BNP.   Recent TTE noted in 1/2024 noted. Will hold TTE until cardio provides recommendations.   Remote Tele. Daily Weights, Strict I & Os. Vitals q4hr   DASH/TLC diet.1500 mL Fluid restriction    Wean off Oxygen as tolerated    Cardio Dr Lynne consulted    Avoid NSAIDs or thiazolidinediones. Dietitian consult

## 2024-02-16 NOTE — PROGRESS NOTE ADULT - PROBLEM SELECTOR PLAN 6
-likely in setting of CHF exacerbation   -renal function improved   -Nephro Dr. Emanuel
Baseline Cr 0.6 to 0.7  Likely secondary to mixed etiology intrinsic, but not limited to prerenal, post renal, CHF  FeUrea 75.7%  Nephrology Dr. Emanuel
-likely in setting of CHF exacerbation   -renal function improved   -Nephro Dr. Emanuel

## 2024-02-16 NOTE — PROGRESS NOTE ADULT - PROBLEM SELECTOR PLAN 1
-episode of Afib with RVR in 130-140's pt asymptomatic   -currently in NSR  -cont Metoprolol  -cont therapeutic Lovenox, change to Eliquis on discharge, prescription sent   -tele monitoring   -Echo with no acute findings  -Cardio Dr. Quezada
secondary to ESBL UTI  continue Meropenem  blood culture negative  afebrile
-Afib with RVR in 130-140's pt asymptomatic   -start Metoprolol, pt converted to NSR this afternoon   -start therapeutic Lovenox  -tele monitoring   -f/u Echo   -Cardio Dr. Quezada

## 2024-02-16 NOTE — PROGRESS NOTE ADULT - SUBJECTIVE AND OBJECTIVE BOX
Patient is a 73y old  Female who presents with a chief complaint of Acute on Chronic Diastolic HF (16 Feb 2024 12:56)    OVERNIGHT EVENTS: no acute events overnight, offering no new complaints     REVIEW OF SYSTEMS:  CONSTITUTIONAL: No fever, chills  RESPIRATORY: No cough, SOB  CARDIOVASCULAR: No chest pain, palpitations  GASTROINTESTINAL: No abdominal pain  GENITOURINARY: No dysuria  NEUROLOGICAL: No HA  MUSCULOSKELETAL: No joint pain or swelling    T(C): 36.9 (02-16-24 @ 11:08), Max: 37.3 (02-15-24 @ 16:27)  HR: 86 (02-16-24 @ 11:08) (75 - 86)  BP: 128/71 (02-16-24 @ 11:08) (101/71 - 133/82)  RR: 20 (02-16-24 @ 11:08) (19 - 20)  SpO2: 98% (02-16-24 @ 11:08) (95% - 100%)  Wt(kg): --Vital Signs Last 24 Hrs  T(C): 36.9 (16 Feb 2024 11:08), Max: 37.3 (15 Feb 2024 16:27)  T(F): 98.5 (16 Feb 2024 11:08), Max: 99.1 (15 Feb 2024 16:27)  HR: 86 (16 Feb 2024 11:08) (75 - 86)  BP: 128/71 (16 Feb 2024 11:08) (101/71 - 133/82)  BP(mean): --  RR: 20 (16 Feb 2024 11:08) (19 - 20)  SpO2: 98% (16 Feb 2024 11:08) (95% - 100%)    Parameters below as of 16 Feb 2024 11:08  Patient On (Oxygen Delivery Method): nasal cannula  O2 Flow (L/min): 3    MEDICATIONS  (STANDING):  aspirin enteric coated 81 milliGRAM(s) Oral daily  atorvastatin 40 milliGRAM(s) Oral at bedtime  busPIRone 10 milliGRAM(s) Oral two times a day  DULoxetine 60 milliGRAM(s) Oral daily  enoxaparin Injectable 100 milliGRAM(s) SubCutaneous every 12 hours  furosemide    Tablet 20 milliGRAM(s) Oral two times a day  gabapentin 100 milliGRAM(s) Oral two times a day  influenza  Vaccine (HIGH DOSE) 0.7 milliLiter(s) IntraMuscular once  levothyroxine 125 MICROGram(s) Oral daily  melatonin 3 milliGRAM(s) Oral at bedtime  metoprolol tartrate 25 milliGRAM(s) Oral two times a day  multivitamin 1 Tablet(s) Oral daily  OLANZapine 10 milliGRAM(s) Oral at bedtime  tiotropium 2.5 MICROgram(s) Inhaler 2 Puff(s) Inhalation daily    MEDICATIONS  (PRN):  acetaminophen     Tablet .. 650 milliGRAM(s) Oral every 6 hours PRN Temp greater or equal to 38C (100.4F), Mild Pain (1 - 3)  albuterol    90 MICROgram(s) HFA Inhaler 2 Puff(s) Inhalation every 6 hours PRN Shortness of Breath and/or Wheezing      PHYSICAL EXAM:  GENERAL: NAD  EYES: clear conjunctiva   ENMT: Moist mucous membranes  NECK: Supple, No JVD  CHEST/LUNG: Clear to auscultation bilaterally; No rales, rhonchi, wheezing, or rubs  HEART: S1, S2, Regular rate and rhythm  ABDOMEN: Soft, Nontender, Nondistended; Bowel sounds present  NEURO: Alert & Oriented X3  EXTREMITIES: 3+ LE edema chronic lymphedema   SKIN: diffuse ecchymosis b/l upper arms     Consultant(s) Notes Reviewed:  [x ] YES  [ ] NO  Care Discussed with Consultants/Other Providers [ x] YES  [ ] NO    LABS:                        8.6    13.46 )-----------( 150      ( 15 Feb 2024 12:14 )             27.1     02-15    140  |  109<H>  |  62<H>  ----------------------------<  107<H>  3.5   |  25  |  0.82    Ca    8.9      15 Feb 2024 12:14  Phos  4.0     02-14  Mg     1.6     02-15    TPro  6.0  /  Alb  1.9<L>  /  TBili  0.7  /  DBili  x   /  AST  35  /  ALT  25  /  AlkPhos  107  02-15    CAPILLARY BLOOD GLUCOSE    Urinalysis Basic - ( 15 Feb 2024 12:14 )  Color: x / Appearance: x / SG: x / pH: x  Gluc: 107 mg/dL / Ketone: x  / Bili: x / Urobili: x   Blood: x / Protein: x / Nitrite: x   Leuk Esterase: x / RBC: x / WBC x   Sq Epi: x / Non Sq Epi: x / Bacteria: x    RADIOLOGY & ADDITIONAL TESTS:  < from: US Renal (02.14.24 @ 11:34) >    ACC: [id number] EXAM:  US KIDNEY(S)   ORDERED BY: ELENA MOORE     PROCEDURE DATE:  02/14/2024          INTERPRETATION:  CLINICAL INFORMATION: Acute renal insufficiency.   Evaluate for urinary retention.    COMPARISON: None available.    TECHNIQUE: Sonography of the kidneys and bladder. The sonographer noted   that the evaluation was technically difficult related to patient body   habitus.    FINDINGS:  Right kidney: 11.4 cm. No hydronephrosis or calculi. 5.6 x 5.4 cm   septated cyst lower pole region.    Left kidney: 10.7 cm. No hydronephrosis or calculi. 3.7 x 3.0 cm cyst   lower pole region.    Urinary bladder: Visualized portions appear unremarkable.    IMPRESSION:  No evidence for bilateral hydronephrosis. Bilateral renal cysts.        < end of copied text >

## 2024-02-16 NOTE — PROGRESS NOTE ADULT - PROBLEM SELECTOR PROBLEM 5
Chronic respiratory failure
Chronic obstructive pulmonary disease (COPD)
Chronic respiratory failure

## 2024-02-16 NOTE — PROGRESS NOTE ADULT - ASSESSMENT
Patient is a 74yo Obese Female wheelchair bound with COPD on 3 L NC at home, hypothyroid, diastolic CHF on Lasix, HTN on Losartan, breast cancer s/p surgery, asthma, chronic lymphedema, Left kidney cancer  s/p partial nephrectomy, p/w SOB and worsening LE edema. Pt a/w acute on chronic diastolic HF and ALDAIR. Nephrology consulted for Elevated serum creatinine.    1. ALDAIR- last SCr 0.6-0.7. ALDAIR likely hemodynamically mediated in the setting of hypotension with acute on chronic dHF. UA active in the setting of infection. FeUrea 29%; low EABV. Renal function improving with diuresis (IV lasix changed to PO on 2/15)  Albumin 1.9; Spot Upr/Cr 1.5; non nephrotic range.  Renal US with no hydro but b/l renal cyst with rt cyst 5.6 cm with septations; recc outpt Urology f/u. . Strict I/Os. Avoid nephrotoxins/ NSAIDs/ RCA. Monitor BMP.  2. Essential HTN- BP acceptable, pt on Metoprolol for tachycardia. Plan as per Cards. Monitor BP.   3. LE edema- Lasix IV changed to 20mg PO bid due to relative hypotension.  Daily weights. Strict I/Os. Dopplers  - limited studies; no  DVTs seen  4. Acute on chronic Diastolic HF- plan as per Cards/ primary team      [facility] NEPHROLOGY  Hernandez Forbes M.D.  Damion Dumont D.O.  Sydney Emanuel M.D.  MD Yeimy Tello, MSN, ANP-C    Telephone: [phone]  Facsimile: [phone] [place] [address], [address]  [place] [id number]

## 2024-02-16 NOTE — PROGRESS NOTE ADULT - SUBJECTIVE AND OBJECTIVE BOX
[facility] NEPHROLOGY- PROGRESS NOTE    Patient is a 74yo Obese Female wheelchair bound with COPD on 3 L NC at home, hypothyroid, diastolic CHF on Lasix, HTN on Losartan, breast cancer s/p surgery, asthma, chronic lymphedema, Left kidney cancer  s/p partial nephrectomy, p/w SOB and worsening LE edema. Pt a/w acute on chronic diastolic HF and ALDAIR. Nephrology consulted for Elevated serum creatinine.    Hospital Medications: Medications reviewed.  REVIEW OF SYSTEMS:  CONSTITUTIONAL: No fevers or chills  RESPIRATORY: No shortness of breath  CARDIOVASCULAR: No chest pain.  GASTROINTESTINAL: No nausea, vomiting, diarrhea or abdominal pain.   VASCULAR: No bilateral lower extremity edema.     VITALS:  T(F): 98.5 (02-16-24 @ 11:08), Max: 99.1 (02-15-24 @ 16:27)  HR: 86 (02-16-24 @ 11:08)  BP: 128/71 (02-16-24 @ 11:08)  RR: 20 (02-16-24 @ 11:08)  SpO2: 98% (02-16-24 @ 11:08)  Wt(kg): --    02-15 @ 07:01  -  02-16 @ 07:00  --------------------------------------------------------  IN: 0 mL / OUT: 500 mL / NET: -500 mL        Weight (kg): 104.8 (02-15 @ 16:09)      PHYSICAL EXAM:  Gen: Obese, AO x1 (to name only)  HEENT: anicteric  Neck: no JVD  Cards: RRR, +S1/S2, no M/G/R  Resp: CTA b/l  GI: soft, NT/ND, NABS  : no CVA tenderness  Extremities: + b/l lymphedema L>R.; drastically improved  Derm: RLE open wound      LABS:  02-15    140  |  109<H>  |  62<H>  ----------------------------<  107<H>  3.5   |  25  |  0.82    Ca    8.9      15 Feb 2024 12:14  Phos  4.0     02-14  Mg     1.6     02-15    TPro  6.0  /  Alb  1.9<L>  /  TBili  0.7  /  DBili      /  AST  35  /  ALT  25  /  AlkPhos  107  02-15    Creatinine Trend: 0.82 <--, 1.46 <--, 2.28 <--, 2.98 <--                        8.6    13.46 )-----------( 150      ( 15 Feb 2024 12:14 )             27.1     Urine Studies:  Urinalysis Basic - ( 15 Feb 2024 12:14 )    Color:  / Appearance:  / SG:  / pH:   Gluc: 107 mg/dL / Ketone:   / Bili:  / Urobili:    Blood:  / Protein:  / Nitrite:    Leuk Esterase:  / RBC:  / WBC    Sq Epi:  / Non Sq Epi:  / Bacteria:       Creatinine, Random Urine: 56 mg/dL (02-14 @ 23:50)  Creatinine, Random Urine: 35 mg/dL (02-13 @ 21:32)

## 2024-02-16 NOTE — PROGRESS NOTE ADULT - PROBLEM SELECTOR PLAN 3
-likely ischemic demand in the setting of tachycardia   -rest plan as above
EKG showing Sinus Tachycardia  Elevated Troponin trended up  No chest pain  Most likely ischemic demand in the setting of CHF  F/U Repeat troponin
-likely ischemic demand in the setting of tachycardia   -rest plan as above

## 2024-02-16 NOTE — PROGRESS NOTE ADULT - PROBLEM SELECTOR PLAN 4
Patient uses 2 to 3L NC  Continue Oxygen Therapy   Will continue Albuterol   Will continue Spiriva   Rest as above
-not in exacerbation  -cont supplemental oxygen, uses 2 to 3L NC at home  -cont Albuterol and Spiriva
-not in exacerbation  -cont supplemental oxygen, uses 2 to 3L NC at home  -cont Albuterol and Spiriva

## 2024-02-17 RX ADMIN — OLANZAPINE 10 MILLIGRAM(S): 15 TABLET, FILM COATED ORAL at 22:44

## 2024-02-17 RX ADMIN — Medication 81 MILLIGRAM(S): at 13:20

## 2024-02-17 RX ADMIN — Medication 3 MILLIGRAM(S): at 22:44

## 2024-02-17 RX ADMIN — DULOXETINE HYDROCHLORIDE 60 MILLIGRAM(S): 30 CAPSULE, DELAYED RELEASE ORAL at 13:19

## 2024-02-17 RX ADMIN — Medication 10 MILLIGRAM(S): at 18:58

## 2024-02-17 RX ADMIN — Medication 20 MILLIGRAM(S): at 06:22

## 2024-02-17 RX ADMIN — ENOXAPARIN SODIUM 100 MILLIGRAM(S): 100 INJECTION SUBCUTANEOUS at 07:02

## 2024-02-17 RX ADMIN — Medication 10 MILLIGRAM(S): at 06:21

## 2024-02-17 RX ADMIN — ENOXAPARIN SODIUM 100 MILLIGRAM(S): 100 INJECTION SUBCUTANEOUS at 18:58

## 2024-02-17 RX ADMIN — Medication 25 MILLIGRAM(S): at 06:22

## 2024-02-17 RX ADMIN — GABAPENTIN 100 MILLIGRAM(S): 400 CAPSULE ORAL at 18:55

## 2024-02-17 RX ADMIN — Medication 25 MILLIGRAM(S): at 18:55

## 2024-02-17 RX ADMIN — Medication 125 MICROGRAM(S): at 06:21

## 2024-02-17 RX ADMIN — ATORVASTATIN CALCIUM 40 MILLIGRAM(S): 80 TABLET, FILM COATED ORAL at 22:44

## 2024-02-17 RX ADMIN — TIOTROPIUM BROMIDE 2 PUFF(S): 18 CAPSULE ORAL; RESPIRATORY (INHALATION) at 13:21

## 2024-02-17 RX ADMIN — Medication 20 MILLIGRAM(S): at 13:21

## 2024-02-17 RX ADMIN — GABAPENTIN 100 MILLIGRAM(S): 400 CAPSULE ORAL at 06:23

## 2024-02-17 RX ADMIN — Medication 1 TABLET(S): at 13:20

## 2024-02-17 NOTE — PROGRESS NOTE ADULT - ASSESSMENT
Patient is a 74yo Obese Female wheelchair bound with COPD on 3 L NC at home, hypothyroid, diastolic CHF on Lasix, HTN on Losartan, breast cancer s/p surgery, asthma, chronic lymphedema, Left kidney cancer  s/p partial nephrectomy, p/w SOB and worsening LE edema. Pt a/w acute on chronic diastolic HF and ALDAIR. Nephrology consulted for Elevated serum creatinine.    1. ALDAIR- last SCr 0.6-0.7. ALDAIR likely hemodynamically mediated in the setting of hypotension with acute on chronic dHF. UA active in the setting of infection. FeUrea 29%; low EABV. Renal function improved with diuresis (IV lasix changed to PO on 2/15).  Albumin 1.9; Spot Upr/Cr 1.5; non nephrotic range.  Renal US with no hydro but b/l renal cyst with rt cyst 5.6 cm with septations; recc outpt Urology f/u. . Strict I/Os. Avoid nephrotoxins/ NSAIDs/ RCA. Monitor BMP.  2. Essential HTN- BP acceptable, pt on Metoprolol for tachycardia. Plan as per Cards. Monitor BP.   3. LE edema- Lasix IV changed to 20mg PO bid due to relative hypotension.  Daily weights. Strict I/Os. Dopplers  - limited studies; no  DVTs seen  4. Acute on chronic Diastolic HF- plan as per Cards/ primary team      [facility] NEPHROLOGY  Hernandez Forbes M.D.  Damion Dumont D.O.  Sydney Emanuel M.D.  MD Yeimy Tello, MSN, ANP-C    Telephone: [phone]  Facsimile: [phone] [place] [address], [address]  [place] [id number]

## 2024-02-17 NOTE — PROGRESS NOTE ADULT - SUBJECTIVE AND OBJECTIVE BOX
C A R D I O L O G Y  **********************************     DATE OF SERVICE: 02-17-24       pt resting in bed, no chest pain or sob       acetaminophen     Tablet .. 650 milliGRAM(s) Oral every 6 hours PRN  albuterol    90 MICROgram(s) HFA Inhaler 2 Puff(s) Inhalation every 6 hours PRN  aspirin enteric coated 81 milliGRAM(s) Oral daily  atorvastatin 40 milliGRAM(s) Oral at bedtime  busPIRone 10 milliGRAM(s) Oral two times a day  DULoxetine 60 milliGRAM(s) Oral daily  enoxaparin Injectable 100 milliGRAM(s) SubCutaneous every 12 hours  furosemide    Tablet 20 milliGRAM(s) Oral two times a day  gabapentin 100 milliGRAM(s) Oral two times a day  influenza  Vaccine (HIGH DOSE) 0.7 milliLiter(s) IntraMuscular once  levothyroxine 125 MICROGram(s) Oral daily  melatonin 3 milliGRAM(s) Oral at bedtime  metoprolol tartrate 25 milliGRAM(s) Oral two times a day  multivitamin 1 Tablet(s) Oral daily  OLANZapine 10 milliGRAM(s) Oral at bedtime  tiotropium 2.5 MICROgram(s) Inhaler 2 Puff(s) Inhalation daily                            8.6    13.46 )-----------( 150      ( 15 Feb 2024 12:14 )             27.1       Hemoglobin: 8.6 g/dL (02-15 @ 12:14)  Hemoglobin: 8.7 g/dL (02-14 @ 17:55)  Hemoglobin: 9.2 g/dL (02-14 @ 01:48)  Hemoglobin: 10.0 g/dL (02-13 @ 07:10)      02-15    140  |  109<H>  |  62<H>  ----------------------------<  107<H>  3.5   |  25  |  0.82    Ca    8.9      15 Feb 2024 12:14  Mg     1.6     02-15    TPro  6.0  /  Alb  1.9<L>  /  TBili  0.7  /  DBili  x   /  AST  35  /  ALT  25  /  AlkPhos  107  02-15    Creatinine Trend: 0.82<--, 1.46<--, 2.28<--, 2.98<--, 0.66<--, 0.77<--    COAGS:           T(C): 36.6 (02-17-24 @ 08:17), Max: 36.9 (02-16-24 @ 11:08)  HR: 69 (02-17-24 @ 08:17) (69 - 91)  BP: 123/61 (02-17-24 @ 08:17) (122/78 - 141/64)  RR: 20 (02-17-24 @ 08:17) (18 - 20)  SpO2: 94% (02-17-24 @ 08:17) (94% - 100%)  Wt(kg): --    I&O's Summary      HEENT:  (-)icterus (-)pallor  CV: N S1 S2 1/6 MANAN (+)2 Pulses B/l  Resp:  Clear to ausculatation B/L, normal effort  GI: (+) BS Soft, NT, ND  Lymph:  (+)Edema, (-)obvious lymphadenopathy  Skin: Warm to touch, Normal turgor  Psych: Appropriate mood and affect      TELEMETRY: 	nsr         ASSESSMENT/PLAN: 	73y  Female , wheelchair-bound, w/ PMHx COPD on 3 L NC at home, Obesity, Hypothyroidism, Diastolic HF on Lasix, Normal lV and RV fx 1/24 at [facility] follows with Dr. Lozoya, Breast cancer s/p surgery, Asthma, Chronic lymphedema, Kidney cancer s/p partial nephrectomy, presented to the ED complaining of rapidly progressive dyspnea at rest that started 24 to 48 hours ago    # SOB  - Significantly elevated BNP since January however repeat echo without pertinent findings   - Left atrium is normal in size which would make significant diastolic syfx unlikely      # Afib  - New onset afib  - cont BB at present, can change to QD 50 mg   - on Theraputic Lovenox   - in NSR for 24 hr   - transition to Novel AC     # Edema  - low albumin ? third spacng  - ? nephrotic syndrome or protein loosing process  - check prealbumin  - lower extremity dopplers (-)    # Renal failure  - renal f/u

## 2024-02-17 NOTE — PROGRESS NOTE ADULT - SUBJECTIVE AND OBJECTIVE BOX
[facility] NEPHROLOGY- PROGRESS NOTE    Patient is a 74yo Obese Female wheelchair bound with COPD on 3 L NC at home, hypothyroid, diastolic CHF on Lasix, HTN on Losartan, breast cancer s/p surgery, asthma, chronic lymphedema, Left kidney cancer  s/p partial nephrectomy, p/w SOB and worsening LE edema. Pt a/w acute on chronic diastolic HF and ALDAIR. Nephrology consulted for Elevated serum creatinine.    Hospital Medications: Medications reviewed.  REVIEW OF SYSTEMS:  CONSTITUTIONAL: No fevers or chills  RESPIRATORY: No shortness of breath  CARDIOVASCULAR: No chest pain.  GASTROINTESTINAL: No nausea, vomiting, diarrhea or abdominal pain.   VASCULAR: No bilateral lower extremity edema.     VITALS:  T(F): 98.1 (02-17-24 @ 15:47), Max: 98.4 (02-16-24 @ 23:10)  HR: 93 (02-17-24 @ 15:47)  BP: 123/70 (02-17-24 @ 15:47)  RR: 20 (02-17-24 @ 15:47)  SpO2: 96% (02-17-24 @ 15:47)      PHYSICAL EXAM:  Gen: Obese, AO x1 (to name only)  HEENT: anicteric  Neck: no JVD  Cards: RRR, +S1/S2, no M/G/R  Resp: CTA b/l  GI: soft, NT/ND, NABS  : no CVA tenderness  Extremities: + b/l lymphedema L>R.; drastically improved  Derm: RLE open wound        LABS:  no new labs        Creatinine Trend: 0.82 <--, 1.46 <--, 2.28 <--, 2.98 <--    Urine Studies:  Urinalysis Basic - ( 15 Feb 2024 12:14 )    Color:  / Appearance:  / SG:  / pH:   Gluc: 107 mg/dL / Ketone:   / Bili:  / Urobili:    Blood:  / Protein:  / Nitrite:    Leuk Esterase:  / RBC:  / WBC    Sq Epi:  / Non Sq Epi:  / Bacteria:       Creatinine, Random Urine: 56 mg/dL (02-14 @ 23:50)  Creatinine, Random Urine: 35 mg/dL (02-13 @ 21:32)
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
## 2024-02-18 ENCOUNTER — TRANSCRIPTION ENCOUNTER (OUTPATIENT)
Age: 74
End: 2024-02-18

## 2024-02-18 ENCOUNTER — EMERGENCY (EMERGENCY)
Facility: HOSPITAL | Age: 74
LOS: 1 days | Discharge: HOME | End: 2024-02-18
Attending: EMERGENCY MEDICINE
Payer: MEDICARE

## 2024-02-18 VITALS
HEIGHT: 62 IN | DIASTOLIC BLOOD PRESSURE: 69 MMHG | OXYGEN SATURATION: 98 % | TEMPERATURE: 98 F | RESPIRATION RATE: 18 BRPM | HEART RATE: 83 BPM | SYSTOLIC BLOOD PRESSURE: 122 MMHG

## 2024-02-18 VITALS
HEART RATE: 95 BPM | RESPIRATION RATE: 20 BRPM | TEMPERATURE: 98 F | DIASTOLIC BLOOD PRESSURE: 80 MMHG | OXYGEN SATURATION: 98 % | SYSTOLIC BLOOD PRESSURE: 158 MMHG

## 2024-02-18 DIAGNOSIS — Z98.89 OTHER SPECIFIED POSTPROCEDURAL STATES: Chronic | ICD-10-CM

## 2024-02-18 DIAGNOSIS — Z90.49 ACQUIRED ABSENCE OF OTHER SPECIFIED PARTS OF DIGESTIVE TRACT: Chronic | ICD-10-CM

## 2024-02-18 DIAGNOSIS — Z90.5 ACQUIRED ABSENCE OF KIDNEY: Chronic | ICD-10-CM

## 2024-02-18 LAB
CULTURE RESULTS: SIGNIFICANT CHANGE UP
SPECIMEN SOURCE: SIGNIFICANT CHANGE UP

## 2024-02-18 PROCEDURE — 83735 ASSAY OF MAGNESIUM: CPT

## 2024-02-18 PROCEDURE — 70450 CT HEAD/BRAIN W/O DYE: CPT | Mod: MA

## 2024-02-18 PROCEDURE — 93005 ELECTROCARDIOGRAM TRACING: CPT

## 2024-02-18 PROCEDURE — 87040 BLOOD CULTURE FOR BACTERIA: CPT

## 2024-02-18 PROCEDURE — 80053 COMPREHEN METABOLIC PANEL: CPT

## 2024-02-18 PROCEDURE — 80061 LIPID PANEL: CPT

## 2024-02-18 PROCEDURE — 85025 COMPLETE CBC W/AUTO DIFF WBC: CPT

## 2024-02-18 PROCEDURE — 93970 EXTREMITY STUDY: CPT

## 2024-02-18 PROCEDURE — 72170 X-RAY EXAM OF PELVIS: CPT

## 2024-02-18 PROCEDURE — 83880 ASSAY OF NATRIURETIC PEPTIDE: CPT

## 2024-02-18 PROCEDURE — 85027 COMPLETE CBC AUTOMATED: CPT

## 2024-02-18 PROCEDURE — P9047: CPT

## 2024-02-18 PROCEDURE — 36415 COLL VENOUS BLD VENIPUNCTURE: CPT

## 2024-02-18 PROCEDURE — 84443 ASSAY THYROID STIM HORMONE: CPT

## 2024-02-18 PROCEDURE — 84100 ASSAY OF PHOSPHORUS: CPT

## 2024-02-18 PROCEDURE — 73552 X-RAY EXAM OF FEMUR 2/>: CPT

## 2024-02-18 PROCEDURE — 82550 ASSAY OF CK (CPK): CPT

## 2024-02-18 PROCEDURE — 82803 BLOOD GASES ANY COMBINATION: CPT

## 2024-02-18 PROCEDURE — 85610 PROTHROMBIN TIME: CPT

## 2024-02-18 PROCEDURE — 71045 X-RAY EXAM CHEST 1 VIEW: CPT

## 2024-02-18 PROCEDURE — 99283 EMERGENCY DEPT VISIT LOW MDM: CPT

## 2024-02-18 PROCEDURE — 73564 X-RAY EXAM KNEE 4 OR MORE: CPT

## 2024-02-18 PROCEDURE — 96375 TX/PRO/DX INJ NEW DRUG ADDON: CPT

## 2024-02-18 PROCEDURE — 76775 US EXAM ABDO BACK WALL LIM: CPT

## 2024-02-18 PROCEDURE — 99285 EMERGENCY DEPT VISIT HI MDM: CPT | Mod: 25

## 2024-02-18 PROCEDURE — 83036 HEMOGLOBIN GLYCOSYLATED A1C: CPT

## 2024-02-18 PROCEDURE — 84540 ASSAY OF URINE/UREA-N: CPT

## 2024-02-18 PROCEDURE — 82962 GLUCOSE BLOOD TEST: CPT

## 2024-02-18 PROCEDURE — 93306 TTE W/DOPPLER COMPLETE: CPT

## 2024-02-18 PROCEDURE — 82570 ASSAY OF URINE CREATININE: CPT

## 2024-02-18 PROCEDURE — 83605 ASSAY OF LACTIC ACID: CPT

## 2024-02-18 PROCEDURE — 84484 ASSAY OF TROPONIN QUANT: CPT

## 2024-02-18 PROCEDURE — 87637 SARSCOV2&INF A&B&RSV AMP PRB: CPT

## 2024-02-18 PROCEDURE — 94640 AIRWAY INHALATION TREATMENT: CPT

## 2024-02-18 PROCEDURE — 96374 THER/PROPH/DIAG INJ IV PUSH: CPT

## 2024-02-18 PROCEDURE — 82330 ASSAY OF CALCIUM: CPT

## 2024-02-18 PROCEDURE — 84156 ASSAY OF PROTEIN URINE: CPT

## 2024-02-18 PROCEDURE — 73590 X-RAY EXAM OF LOWER LEG: CPT

## 2024-02-18 PROCEDURE — 81001 URINALYSIS AUTO W/SCOPE: CPT

## 2024-02-18 PROCEDURE — 84132 ASSAY OF SERUM POTASSIUM: CPT

## 2024-02-18 PROCEDURE — 85730 THROMBOPLASTIN TIME PARTIAL: CPT

## 2024-02-18 PROCEDURE — 84295 ASSAY OF SERUM SODIUM: CPT

## 2024-02-18 PROCEDURE — 72125 CT NECK SPINE W/O DYE: CPT | Mod: MA

## 2024-02-18 RX ORDER — ACETAMINOPHEN 500 MG
650 TABLET ORAL ONCE
Refills: 0 | Status: COMPLETED | OUTPATIENT
Start: 2024-02-18 | End: 2024-02-18

## 2024-02-18 RX ORDER — METOPROLOL TARTRATE 50 MG
1 TABLET ORAL
Qty: 60 | Refills: 0
Start: 2024-02-18 | End: 2024-03-18

## 2024-02-18 RX ORDER — FUROSEMIDE 40 MG
1 TABLET ORAL
Qty: 30 | Refills: 0
Start: 2024-02-18 | End: 2024-03-18

## 2024-02-18 RX ORDER — HYDRALAZINE HCL 50 MG
3 TABLET ORAL
Refills: 0 | DISCHARGE

## 2024-02-18 RX ORDER — PROPRANOLOL HCL 160 MG
1 CAPSULE, EXTENDED RELEASE 24HR ORAL
Refills: 0 | DISCHARGE

## 2024-02-18 RX ORDER — LOSARTAN POTASSIUM 100 MG/1
1 TABLET, FILM COATED ORAL
Refills: 0 | DISCHARGE

## 2024-02-18 RX ADMIN — Medication 20 MILLIGRAM(S): at 15:09

## 2024-02-18 RX ADMIN — ENOXAPARIN SODIUM 100 MILLIGRAM(S): 100 INJECTION SUBCUTANEOUS at 17:38

## 2024-02-18 RX ADMIN — Medication 125 MICROGRAM(S): at 06:53

## 2024-02-18 RX ADMIN — TIOTROPIUM BROMIDE 2 PUFF(S): 18 CAPSULE ORAL; RESPIRATORY (INHALATION) at 12:58

## 2024-02-18 RX ADMIN — Medication 650 MILLIGRAM(S): at 22:40

## 2024-02-18 RX ADMIN — GABAPENTIN 100 MILLIGRAM(S): 400 CAPSULE ORAL at 06:53

## 2024-02-18 RX ADMIN — Medication 25 MILLIGRAM(S): at 17:38

## 2024-02-18 RX ADMIN — Medication 81 MILLIGRAM(S): at 12:57

## 2024-02-18 RX ADMIN — ENOXAPARIN SODIUM 100 MILLIGRAM(S): 100 INJECTION SUBCUTANEOUS at 06:52

## 2024-02-18 RX ADMIN — Medication 650 MILLIGRAM(S): at 23:00

## 2024-02-18 RX ADMIN — DULOXETINE HYDROCHLORIDE 60 MILLIGRAM(S): 30 CAPSULE, DELAYED RELEASE ORAL at 12:57

## 2024-02-18 RX ADMIN — Medication 25 MILLIGRAM(S): at 06:53

## 2024-02-18 RX ADMIN — Medication 20 MILLIGRAM(S): at 06:53

## 2024-02-18 RX ADMIN — Medication 10 MILLIGRAM(S): at 06:52

## 2024-02-18 RX ADMIN — Medication 10 MILLIGRAM(S): at 17:38

## 2024-02-18 RX ADMIN — Medication 1 TABLET(S): at 12:57

## 2024-02-18 RX ADMIN — GABAPENTIN 100 MILLIGRAM(S): 400 CAPSULE ORAL at 17:38

## 2024-02-18 NOTE — DISCHARGE NOTE PROVIDER - NSDCMRMEDTOKEN_GEN_ALL_CORE_FT
amLODIPine 5 mg oral tablet: 1 tab(s) orally once a day  apixaban 5 mg oral tablet: 1 tab(s) orally 2 times a day  aspirin 81 mg oral delayed release tablet: 1 tab(s) orally once a day  atorvastatin 40 mg oral tablet: 1 tab(s) orally once a day (at bedtime)  budesonide 0.5 mg/2 mL inhalation suspension: 2 milliliter(s) by nebulizer 2 times a day  busPIRone 10 mg oral tablet: 2 tab(s) orally once a day  DULoxetine 60 mg oral delayed release capsule: 1 cap(s) orally once a day  gabapentin 100 mg oral capsule: 1 cap(s) orally 2 times a day  hydrALAZINE 25 mg oral tablet: 3 tab(s) orally every 12 hours  levothyroxine 125 mcg (0.125 mg) oral tablet: 1 tab(s) orally once a day  montelukast 10 mg oral tablet: 1 tab(s) orally once a day  Multiple Vitamins oral tablet: 1 tab(s) orally once a day  OLANZapine 10 mg oral tablet: 1 tab(s) orally once a day (at bedtime)  Spiriva HandiHaler 18 mcg inhalation capsule: 1 cap(s) inhaled once a day   amLODIPine 5 mg oral tablet: 1 tab(s) orally once a day  apixaban 5 mg oral tablet: 1 tab(s) orally 2 times a day  aspirin 81 mg oral delayed release tablet: 1 tab(s) orally once a day  atorvastatin 40 mg oral tablet: 1 tab(s) orally once a day (at bedtime)  budesonide 0.5 mg/2 mL inhalation suspension: 2 milliliter(s) by nebulizer 2 times a day  busPIRone 10 mg oral tablet: 2 tab(s) orally once a day  DULoxetine 60 mg oral delayed release capsule: 1 cap(s) orally once a day  furosemide 20 mg oral tablet: 1 tab(s) orally once a day  gabapentin 100 mg oral capsule: 1 cap(s) orally 2 times a day  levothyroxine 125 mcg (0.125 mg) oral tablet: 1 tab(s) orally once a day  metoprolol tartrate 25 mg oral tablet: 1 tab(s) orally 2 times a day  montelukast 10 mg oral tablet: 1 tab(s) orally once a day  Multiple Vitamins oral tablet: 1 tab(s) orally once a day  OLANZapine 10 mg oral tablet: 1 tab(s) orally once a day (at bedtime)  Spiriva HandiHaler 18 mcg inhalation capsule: 1 cap(s) inhaled once a day   amLODIPine 5 mg oral tablet: 1 tab(s) orally once a day  apixaban 5 mg oral tablet: 1 tab(s) orally 2 times a day  aspirin 81 mg oral delayed release tablet: 1 tab(s) orally once a day  atorvastatin 40 mg oral tablet: 1 tab(s) orally once a day (at bedtime)  budesonide 0.5 mg/2 mL inhalation suspension: 2 milliliter(s) by nebulizer 2 times a day  busPIRone 10 mg oral tablet: 2 tab(s) orally once a day  DULoxetine 60 mg oral delayed release capsule: 1 cap(s) orally once a day  furosemide 20 mg oral tablet: 1 tab(s) orally once a day  gabapentin 100 mg oral capsule: 1 cap(s) orally 2 times a day  levothyroxine 125 mcg (0.125 mg) oral tablet: 1 tab(s) orally once a day  linezolid 600 mg oral tablet: 1 tab(s) orally every 12 hours MDD: 1200 mg  metoprolol tartrate 25 mg oral tablet: 1 tab(s) orally 2 times a day  montelukast 10 mg oral tablet: 1 tab(s) orally once a day  Multiple Vitamins oral tablet: 1 tab(s) orally once a day  nystatin 100,000 units/g topical powder: 1 Apply topically to affected area 2 times a day  OLANZapine 10 mg oral tablet: 1 tab(s) orally once a day (at bedtime)  pantoprazole 40 mg intravenous injection: 40 milligram(s) intravenous once a day  Spiriva HandiHaler 18 mcg inhalation capsule: 1 cap(s) inhaled once a day  zinc oxide 20% topical ointment: 1 Apply topically to affected area 2 times a day

## 2024-02-18 NOTE — ED PROVIDER NOTE - NSFOLLOWUPINSTRUCTIONS_ED_ALL_ED_FT
Thank you for choosing [facility] for your healthcare.    You were seen in the Emergency Department for a skin tear of your leg which was bleeding more heavily than normal because you are now on a blood thinner.  We were able to get it under control with pressure dressings and a gauze that is impregnated with a clotting agent in the emergency room.  Please keep the dressings in place for the next few days and do not remove most bottom piece of material until it falls off on its own.  Please follow-up with your primary care doctor as was planned from your recent hospital stay.  Please return to the emergency room for any further emergent or concerning medical issues.

## 2024-02-18 NOTE — DISCHARGE NOTE NURSING/CASE MANAGEMENT/SOCIAL WORK - PATIENT PORTAL LINK FT
You can access the FollowMyHealth Patient Portal offered by [facility] by registering at the following website: [email or web address]. By joining [employer]’s FollowMyHealth portal, you will also be able to view your health information using other applications (apps) compatible with our system.

## 2024-02-18 NOTE — ED ADULT NURSE NOTE - CHIEF COMPLAINT QUOTE
pt was  discharged from [facility]  while taking her home pt started bleeding from  right  lower leg. pt on  eliquis . pt took eliquis before discharge pt on oxygen  depended at home

## 2024-02-18 NOTE — PROGRESS NOTE ADULT - REASON FOR ADMISSION
Acute on Chronic Diastolic HF

## 2024-02-18 NOTE — DISCHARGE NOTE PROVIDER - NSDCCPCAREPLAN_GEN_ALL_CORE_FT
PRINCIPAL DISCHARGE DIAGNOSIS  Diagnosis: Acute on chronic diastolic heart failure  Assessment and Plan of Treatment: you presented to hospital with shortness of breath, you were admitted to hospital for the congestive heart failure flare up, you were started on diuretics and supplemental oxygen. you were evaluated by Cardiologist, your symptoms improved and remained stable   Weigh yourself daily.  Follow up with PCP or Cardiologist within 1 week   If you gain 3lbs in 3 days, or 5lbs in a week call your Health Care Provider.  Do not eat or drink foods containing more than 2000mg of salt (sodium) in your diet every day.  Call your Health Care Provider if you have any swelling or increased swelling in your feet, ankles, and/or stomach.  Take all of your medication as directed.  If you become dizzy call your Health Care Provider.      SECONDARY DISCHARGE DIAGNOSES  Diagnosis: Atrial fibrillation with RVR  Assessment and Plan of Treatment: You were found to have Atrial fibrillation which is the most common heart rhythm problem & has the risk of stroke & heart attack  you were evaluated ny Cardiologist and were started on blood thinners to prevent risk for strokes   It is important to take your heart medication as prescribed  Follow up with PCP and Cardiologist outpatient       Diagnosis: Chronic respiratory failure  Assessment and Plan of Treatment: you have history of COPD and are on home oxygen   Continue taking medications as prescribed    Diagnosis: Acute renal failure  Assessment and Plan of Treatment: your kidney function was elevated more than your baseline likley due to diurestic use , your medications were adjusted and were eveluated by Kidney specialist  your kidney function improved and remained stable at your baseline

## 2024-02-18 NOTE — ED PROVIDER NOTE - PROGRESS NOTE DETAILS
Initial dressing taken down, clotting around surgicel noted, new dressing placed, oozing decreased. Patient refusing CT head, understands risks of missed injury. No longer appears to be bleeding, converted to non adherent dressing with gauze on top.  Patient reporting unable to go home until morning when her son will be able to get her home, will hold until AM to confirm safe discharge. No longer appears to be bleeding, converted to non adherent dressing with gauze on top.  Patient reporting unable to go home until morning when her son will be able to get her home, discharge but  hold until AM to confirm family available to get her.

## 2024-02-18 NOTE — ED PROVIDER NOTE - CLINICAL SUMMARY MEDICAL DECISION MAKING FREE TEXT BOX
Patient presenting with skin tear with oozing bleeding since injury earlier today.  Will apply pressure dressing with Tegaderm underneath and monitor for further bleeding. Patient presenting with skin tear with oozing bleeding since injury earlier today.  Will apply pressure dressing with Tegaderm underneath and monitor for further bleeding.  Will also obtain CT head for screening for possible occult head injury given nasal abrasion.

## 2024-02-18 NOTE — ED PROVIDER NOTE - OBJECTIVE STATEMENT
73-year-old woman with extensive past medical history presenting for bleeding from right leg beginning earlier today.  She was discharged to home from this hospital earlier today.  She scraped her leg since returning home and has been bleeding since.  She was started on Eliquis during the last visit.

## 2024-02-18 NOTE — DISCHARGE NOTE PROVIDER - NSDCCAREPROVSEEN_GEN_ALL_CORE_FT
Kamila Welch Kamila Delshshruti Gamboaorbbenjamín Delshadky Gamboaorbbenjamín Delshadky Wallace  Ordering Physician  Goreg Lund      [ Greater than 35 min spent for discharge services.   DIANE Welch ]       ( Note written / Date of service is the same as last day of patient stay  in the hospital ( for billing purposes)))

## 2024-02-18 NOTE — DISCHARGE NOTE PROVIDER - PROVIDER TOKENS
PROVIDER:[TOKEN:[[id number]],FOLLOWUP:[1 week]] PROVIDER:[TOKEN:[[id number]],FOLLOWUP:[1 week]]

## 2024-02-18 NOTE — ED PROVIDER NOTE - PHYSICAL EXAMINATION
Exam:  General: Patient chronically ill appearing, vital signs within normal limits  HEENT: airway patent with moist mucous membranes  Cardiac: RRR S1/S2  Respiratory: lungs clear without respiratory distress  Ext: bilateral lymphedema  Skin: R lateral shin skin tear with oozing bleeding Exam:  General: Patient chronically ill appearing, vital signs within normal limits  HEENT: airway patent with moist mucous membranes, small abrasion to nose  Cardiac: RRR S1/S2  Respiratory: lungs clear without respiratory distress  Ext: bilateral lymphedema  Skin: R lateral shin skin tear with oozing bleeding

## 2024-02-18 NOTE — PROGRESS NOTE ADULT - NUTRITIONAL ASSESSMENT
This patient has been assessed with a concern for Malnutrition and has been determined to have a diagnosis/diagnoses of Morbid obesity (BMI > 40).    This patient is being managed with:   Diet DASH/TLC-  Sodium & Cholesterol Restricted  1500mL Fluid Restriction ([id number])     Special Instructions for Nursin milliLiter(s) to 2000 milliLiter(s) fluid restriction  Entered: 2024  5:24PM  
This patient has been assessed with a concern for Malnutrition and has been determined to have a diagnosis/diagnoses of Morbid obesity (BMI > 40).    This patient is being managed with:   Diet DASH/TLC-  Sodium & Cholesterol Restricted  1500mL Fluid Restriction ([id number])     Special Instructions for Nursin milliLiter(s) to 2000 milliLiter(s) fluid restriction  Entered: 2024  5:24PM  
This patient has been assessed with a concern for Malnutrition and has been determined to have a diagnosis/diagnoses of Morbid obesity (BMI > 40).    This patient is being managed with:   Diet DASH/TLC-  Sodium & Cholesterol Restricted  1500mL Fluid Restriction ([id number])     Special Instructions for Nursin milliLiter(s) to 2000 milliLiter(s) fluid restriction  Entered: 2024  5:24PM  
This patient has been assessed with a concern for Malnutrition and has been determined to have a diagnosis/diagnoses of Morbid obesity (BMI > 40).    This patient is being managed with:   Diet DASH/TLC-  Sodium & Cholesterol Restricted  1500mL Fluid Restriction ([id number])     Special Instructions for Nursin milliLiter(s) to 2000 milliLiter(s) fluid restriction  Entered: 2024  5:24PM

## 2024-02-18 NOTE — ED PROVIDER NOTE - PATIENT PORTAL LINK FT
You can access the FollowMyHealth Patient Portal offered by [facility] by registering at the following website: [email or web address]. By joining [employer]’s FollowMyHealth portal, you will also be able to view your health information using other applications (apps) compatible with our system.

## 2024-02-18 NOTE — DISCHARGE NOTE PROVIDER - NSDCHHCONTACT_GEN_ALL_CORE_FT
What Type Of Note Output Would You Prefer (Optional)?: Standard Output
Hpi Title: Evaluation of Skin Lesions
How Severe Are Your Spot(S)?: mild
As certified below, I, or a nurse practitioner or physician assistant working with me, had a face-to-face encounter that meets the physician face-to-face encounter requirements.
Have Your Spot(S) Been Treated In The Past?: has not been treated
Additional History: UPPER BODY PER PATIENT REQUEST

## 2024-02-18 NOTE — ED ADULT NURSE NOTE - NSFALLHARMRISKINTERV_ED_ALL_ED

## 2024-02-18 NOTE — ED ADULT TRIAGE NOTE - CHIEF COMPLAINT QUOTE
pt was  discharged from [facility]  while taking her home pt started bleeding from  right  lower leg. pt on  eliquis . pt took eliquis before discharge pt on oxygen  depended at home

## 2024-02-18 NOTE — DISCHARGE NOTE PROVIDER - CARE PROVIDER_API CALL
Thanh Quezada  Cardiovascular Disease  [address] [facility], [facility] [address]  [place] [address]  Phone: [phone]  Fax: [phone]  Follow Up Time: 1 week   Juan Salcedo  Cardiovascular Disease  [address] [place] [address]  Phone: [phone]  Fax: [phone]  Follow Up Time: 1 week

## 2024-02-18 NOTE — PROGRESS NOTE ADULT - NS ATTEND AMEND GEN_ALL_CORE FT
continue AV ld blockers for rate control.  start Xarelto 20mg daily or Eliquis 5mg BID for AFib stroke prevention.  followup with Dr Maldonado Salcedo for Cardiology.
continue AV ld blockers for rate control.  start Xarelto 20mg daily or Eliquis 5mg BID for AFib stroke prevention.  followup with Dr Maldonado Salcedo for Cardiology.

## 2024-02-18 NOTE — PROGRESS NOTE ADULT - PROVIDER SPECIALTY LIST ADULT
Cardiology
Cardiology
Internal Medicine
Internal Medicine
Nephrology
Cardiology
Internal Medicine
Nephrology
Cardiology
Internal Medicine
Nephrology
Nephrology
Internal Medicine
Internal Medicine

## 2024-02-18 NOTE — ED ADULT NURSE NOTE - OBJECTIVE STATEMENT
Patient brought into ED by EMS while returning back to home as patient was just discharged from hospital today. Patient started bleeding from right lower leg skin tear site- oozing bright red blood during transfer from hospital to home. No chest pain. No SOB.

## 2024-02-18 NOTE — PROGRESS NOTE ADULT - ASSESSMENT
_________________________________________________________________________________________  ========>>  M E D I C A L   A T T E N D I N G    F O L L O W  U P  N O T E  <<=========  -----------------------------------------------------------------------------------------------------    - Patient seen and examined by me earlier today.     patient mostly in sinus..    no major events reported / noted otherwise     Eliquis checked and is covered by insurance     ==================>> REVIEW OF SYSTEM <<=================    GEN: no fever, no chills, no pain  RESP:  SOB Improved, no cough, no sputum  CVS: no chest pain, no palpitations  GI: no abdominal pain, no nausea  : no dysuria, no frequency  Neuro: no headache, no dizziness    ==================>> PHYSICAL EXAM <<=================    GEN: A&O X 2 , NAD , comfortable, pleasant, calm in bed .. ( Patient at baseline bedbound/wheelchair-bound)  HEENT: NCAT, PERRL, MMM, hearing intact  CVS: S1S2 , regular , No M/R/G appreciated  PULM: CTA B/L,  limited exam due to body habitus.   ABD.: soft. non tender, non distended,  bowel sounds present, obese   Extrem: intact pulses , Chronic appearing lymphedema and chronic changes       ( Note written / Date of service 24 ( This is certified to be the same as "ENTERED" date above ( for billing purposes)))    ==================>> MEDICATIONS <<====================    aspirin enteric coated 81 milliGRAM(s) Oral daily  atorvastatin 40 milliGRAM(s) Oral at bedtime  busPIRone 10 milliGRAM(s) Oral two times a day  DULoxetine 60 milliGRAM(s) Oral daily  enoxaparin Injectable 100 milliGRAM(s) SubCutaneous every 12 hours  furosemide    Tablet 20 milliGRAM(s) Oral two times a day  gabapentin 100 milliGRAM(s) Oral two times a day  influenza  Vaccine (HIGH DOSE) 0.7 milliLiter(s) IntraMuscular once  levothyroxine 125 MICROGram(s) Oral daily  melatonin 3 milliGRAM(s) Oral at bedtime  metoprolol tartrate 25 milliGRAM(s) Oral two times a day  multivitamin 1 Tablet(s) Oral daily  OLANZapine 10 milliGRAM(s) Oral at bedtime  tiotropium 2.5 MICROgram(s) Inhaler 2 Puff(s) Inhalation daily    MEDICATIONS  (PRN):  acetaminophen     Tablet .. 650 milliGRAM(s) Oral every 6 hours PRN Temp greater or equal to 38C (100.4F), Mild Pain (1 - 3)  albuterol    90 MICROgram(s) HFA Inhaler 2 Puff(s) Inhalation every 6 hours PRN Shortness of Breath and/or Wheezing    ___________  Active diet:  Diet, DASH/TLC:   Sodium & Cholesterol Restricted  1500mL Fluid Restriction ([id number])     Special Instructions for Nursin milliLiter(s) to 2000 milliLiter(s) fluid restriction  ___________________    ==================>> VITAL SIGNS <<==================    Weight (kg): 104.8  Vital Signs Last 24 HrsT(C): 37.1 (24 @ 10:56)  T(F): 98.8 (24 @ 10:56), Max: 98.8 (24 @ 10:56)  HR: 66 (24 @ 10:56) (66 - 93)  BP: 153/65 (24 @ 10:56)  RR: 18 (24 @ 10:56) (18 - 20)  SpO2: 100% (24 @ 10:56) (96% - 100%)       ==================>> LAB AND IMAGING <<==================       no labs today       < from: US Renal (24 @ 11:34) >  IMPRESSION:  No evidence for bilateral hydronephrosis. Bilateral renal cysts.  < end of copied text >    < from: US Duplex Venous Lower Ext Complete, Bilateral (24 @ 09:25) >  IMPRESSION:  Very technically limited study with no evidence of deep venous thrombosis   in the visualized bilateral lower extremity veins.  < end of copied text >    ___________________________________________________________________________________  ===============>>  A S S E S S M E N T   A N D   P L A N <<===============  ------------------------------------------------------------------------------------------    · Assessment	  A 73 year old female, from home, wheelchair-bound, w/ PMHx COPD on 3 L NC at home, Obesity, Hypothyroidism, Diastolic HF on Lasix, Breast cancer s/p surgery, Asthma, Chronic lymphedema, Kidney cancer s/p partial nephrectomy, presented to the ED complaining of rapidly progressive dyspnea at rest that started 24 to 48 hours ago. Since then, she has developed significant orthopnea, abdominal girth, and leg swelling from her baseline. Admitted to telemetry for Acute on Chronic Diastolic HF.   Overnight patient develop hypotension, Lasix held, will restart low dose and spread out regimen. Cardiology recommend Doppler negative for DVT. Recommending ECHO. Nephrology consulted.      Problem/Plan - :  ·  Problem: New onset atrial fibrillation    Rapid heart rate as noted above    overall rate better controlled now    patient on the full dose of Lovenox    as per patient's  at bedside, patient Used to be on Blood thinners in the past, however it was Stopped for unknown reasons ( My review of electronic medical records, did not Find Any evidence of prior AF)    Cardiology appreciated     continue medical management as noted     start Eliquis      monitor on tele     Problem/Plan - :  ·  Problem: Sepsis.     Maybe from urinary infection    finish meropenem and DC     monitor      Problem/Plan - :  ·  Problem: Acute on chronic diastolic heart failure.   Cardiology appreciated  continue diuretics > PO on DC   repeat echocardiogram   Monitor creatinine closely on diuretics  Monitor vitals, labs, intake and output  Wean down Oxygen To baseline as tolerated       Problem/Plan - :  ·  Problem: Elevated troponin.   Likely demand ischemia from tachycardia and heart failure and sepsis   Limited utility and repeat troponin given ALDAIR  No chest pain  Cardiology following     Problem/Plan - :  ·  Problem: Acute on Chronic Hypoxemic respiratory failure.     Chronic COPD  ·  Plan: Patient uses 2 to 3L NC  Continue Oxygen Therapy And wean down to baseline  Albuterol /  Spiriva   Rest as above.     Problem/Plan - :  ·  Problem: ALDAIR :: Resolved  ·  Plan: Baseline Cr 0.6 to 0.7  Nephrology Consult and management appreciated  Sonogram as above: no hydronephrosis  trend creatinine  Creatinine:  0.82  <<==, 1.46  <<==, 2.28  <<==, 2.98  <<==     Problem/Plan - :  ·  Problem: Prophylactic measure.   ·  Plan: DVT PPX: Heparin SQ.    DC planing Sun / Mon ?     needs aid services resumed..  pending     --------------------------------------------  Case discussed with Patient,  Nurse Practitioner   Education given on findings and plan of care  ___________________________  H. JANICE Welch.  Pager: [phone]

## 2024-02-18 NOTE — DISCHARGE NOTE PROVIDER - HOSPITAL COURSE
73 year old female, from home, wheelchair-bound, w/ PMHx COPD on 3 L NC at home, Obesity, Hypothyroidism, Diastolic HF on Lasix, Breast cancer s/p surgery, Asthma, Chronic lymphedema, Kidney cancer s/p partial nephrectomy, presented to the ED complaining of rapidly progressive dyspnea at rest and she has developed significant orthopnea, abdominal girth, and leg swelling from her baseline. Admitted to telemetry for Acute on Chronic Diastolic HF.   Cardiology consulted, LE US negative for dvt.   Alos found to have ALDAIR on admission Nephrology consulted. renal function improved   Hospital course complicated with episodes of Afib with RVR in 130-140's, started on Metoprolol, Cardio Dr. Quezada consulted   Pt converted to NSR. Echo with no acute findings   Clinically improved, optimized for discharge with outpt follow up   Please note that this a brief summary of hospital course please refer to daily progress notes and consult notes for full course and events

## 2024-02-18 NOTE — CHART NOTE - NSCHARTNOTEFT_GEN_A_CORE
Pt for discharge back home today, spoke to pt and spouse at bedside regarding discharge plan, outpt follow up and medication changes including Eliquis, indication, dosing and side effects reviewed, all questions answered. Pt's spouse already picked up the  Eliquis from the pharmacy.     Discharge plans and med list reviewed with Dr. Welch

## 2024-02-18 NOTE — PROGRESS NOTE ADULT - SUBJECTIVE AND OBJECTIVE BOX
C A R D I O L O G Y  **********************************     DATE OF SERVICE: 02-18    no events overnight , pt resting in bed       acetaminophen     Tablet .. 650 milliGRAM(s) Oral every 6 hours PRN  albuterol    90 MICROgram(s) HFA Inhaler 2 Puff(s) Inhalation every 6 hours PRN  aspirin enteric coated 81 milliGRAM(s) Oral daily  atorvastatin 40 milliGRAM(s) Oral at bedtime  busPIRone 10 milliGRAM(s) Oral two times a day  DULoxetine 60 milliGRAM(s) Oral daily  enoxaparin Injectable 100 milliGRAM(s) SubCutaneous every 12 hours  furosemide    Tablet 20 milliGRAM(s) Oral two times a day  gabapentin 100 milliGRAM(s) Oral two times a day  influenza  Vaccine (HIGH DOSE) 0.7 milliLiter(s) IntraMuscular once  levothyroxine 125 MICROGram(s) Oral daily  melatonin 3 milliGRAM(s) Oral at bedtime  metoprolol tartrate 25 milliGRAM(s) Oral two times a day  multivitamin 1 Tablet(s) Oral daily  OLANZapine 10 milliGRAM(s) Oral at bedtime  tiotropium 2.5 MICROgram(s) Inhaler 2 Puff(s) Inhalation daily          Hemoglobin: 8.6 g/dL (02-15 @ 12:14)  Hemoglobin: 8.7 g/dL (02-14 @ 17:55)  Hemoglobin: 9.2 g/dL (02-14 @ 01:48)            Creatinine Trend: 0.82<--, 1.46<--, 2.28<--, 2.98<--, 0.66<--, 0.77<--    COAGS:           T(C): 37 (02-18-24 @ 08:17), Max: 37 (02-17-24 @ 23:56)  HR: 80 (02-18-24 @ 08:17) (68 - 93)  BP: 125/67 (02-18-24 @ 08:17) (123/70 - 146/77)  RR: 18 (02-18-24 @ 08:17) (18 - 20)  SpO2: 99% (02-18-24 @ 08:17) (93% - 100%)  Wt(kg): --    I&O's Summary    17 Feb 2024 07:01  -  18 Feb 2024 07:00  --------------------------------------------------------  IN: 0 mL / OUT: 900 mL / NET: -900 mL          HEENT:  (-)icterus (-)pallor  CV: N S1 S2 1/6 MANAN (+)2 Pulses B/l  Resp:  Clear to ausculatation B/L, normal effort  GI: (+) BS Soft, NT, ND  Lymph:  (+)Edema, (-)obvious lymphadenopathy  Skin: Warm to touch, Normal turgor  Psych: Appropriate mood and affect      TELEMETRY: 	nsr         ASSESSMENT/PLAN: 	73y  Female , wheelchair-bound, w/ PMHx COPD on 3 L NC at home, Obesity, Hypothyroidism, Diastolic HF on Lasix, Normal lV and RV fx 1/24 at [facility] follows with Dr. Lozoya, Breast cancer s/p surgery, Asthma, Chronic lymphedema, Kidney cancer s/p partial nephrectomy, presented to the ED complaining of rapidly progressive dyspnea at rest that started 24 to 48 hours ago    # SOB  - Significantly elevated BNP since January however repeat echo without pertinent findings   - Left atrium is normal in size which would make significant diastolic syfx unlikely      # Afib  - New onset afib  - cont BB at present, can change to QD 50 mg   - on Theraputic Lovenox   -  remains in NSR   - transition to Novel AC     # Edema  - low albumin ? third spacng  - ? nephrotic syndrome or protein loosing process  - check prealbumin  - lower extremity dopplers (-)    # Renal failure  - renal f/u    C A R D I O L O G Y  **********************************     DATE OF SERVICE: 02-18    no events overnight , pt resting in bed       acetaminophen     Tablet .. 650 milliGRAM(s) Oral every 6 hours PRN  albuterol    90 MICROgram(s) HFA Inhaler 2 Puff(s) Inhalation every 6 hours PRN  aspirin enteric coated 81 milliGRAM(s) Oral daily  atorvastatin 40 milliGRAM(s) Oral at bedtime  busPIRone 10 milliGRAM(s) Oral two times a day  DULoxetine 60 milliGRAM(s) Oral daily  enoxaparin Injectable 100 milliGRAM(s) SubCutaneous every 12 hours  furosemide    Tablet 20 milliGRAM(s) Oral two times a day  gabapentin 100 milliGRAM(s) Oral two times a day  influenza  Vaccine (HIGH DOSE) 0.7 milliLiter(s) IntraMuscular once  levothyroxine 125 MICROGram(s) Oral daily  melatonin 3 milliGRAM(s) Oral at bedtime  metoprolol tartrate 25 milliGRAM(s) Oral two times a day  multivitamin 1 Tablet(s) Oral daily  OLANZapine 10 milliGRAM(s) Oral at bedtime  tiotropium 2.5 MICROgram(s) Inhaler 2 Puff(s) Inhalation daily          Hemoglobin: 8.6 g/dL (02-15 @ 12:14)  Hemoglobin: 8.7 g/dL (02-14 @ 17:55)  Hemoglobin: 9.2 g/dL (02-14 @ 01:48)            Creatinine Trend: 0.82<--, 1.46<--, 2.28<--, 2.98<--, 0.66<--, 0.77<--    COAGS:           T(C): 37 (02-18-24 @ 08:17), Max: 37 (02-17-24 @ 23:56)  HR: 80 (02-18-24 @ 08:17) (68 - 93)  BP: 125/67 (02-18-24 @ 08:17) (123/70 - 146/77)  RR: 18 (02-18-24 @ 08:17) (18 - 20)  SpO2: 99% (02-18-24 @ 08:17) (93% - 100%)  Wt(kg): --    I&O's Summary    17 Feb 2024 07:01  -  18 Feb 2024 07:00  --------------------------------------------------------  IN: 0 mL / OUT: 900 mL / NET: -900 mL          HEENT:  (-)icterus (-)pallor  CV: N S1 S2 1/6 MANAN (+)2 Pulses B/l  Resp:  Clear to ausculatation B/L, normal effort  GI: (+) BS Soft, NT, ND  Lymph:  (+)Edema, (-)obvious lymphadenopathy  Skin: Warm to touch, Normal turgor  Psych: Appropriate mood and affect      TELEMETRY: 	nsr         ASSESSMENT/PLAN: 	73y  Female , wheelchair-bound, w/ PMHx COPD on 3 L NC at home, Obesity, Hypothyroidism, Diastolic HF on Lasix, Normal lV and RV fx 1/24 at [facility] follows with Dr. Salcedo, Breast cancer s/p surgery, Asthma, Chronic lymphedema, Kidney cancer s/p partial nephrectomy, presented to the ED complaining of rapidly progressive dyspnea at rest that started 24 to 48 hours ago    # SOB  - Significantly elevated BNP since January however repeat echo without pertinent findings   - Left atrium is normal in size which would make significant diastolic syfx unlikely      # Afib  - New onset afib  - cont BB at present, can change to QD 50 mg   - on Theraputic Lovenox   -  remains in NSR   - transition to Novel AC     # Edema  - low albumin ? third spacng  - ? nephrotic syndrome or protein loosing process  - check prealbumin  - lower extremity dopplers (-)    # Renal failure  - renal f/u

## 2024-02-18 NOTE — ED PROVIDER NOTE - NSFOLLOWUPCLINICS_GEN_ALL_ED_FT
[facility]  Podiatry/Wound Care  [address] [place] [address]  Phone: [phone]  Fax: [phone]

## 2024-02-19 ENCOUNTER — EMERGENCY (EMERGENCY)
Facility: HOSPITAL | Age: 74
LOS: 1 days | Discharge: HOME | End: 2024-02-19
Attending: EMERGENCY MEDICINE
Payer: MEDICARE

## 2024-02-19 VITALS
SYSTOLIC BLOOD PRESSURE: 157 MMHG | DIASTOLIC BLOOD PRESSURE: 130 MMHG | WEIGHT: 199.96 LBS | HEIGHT: 62 IN | TEMPERATURE: 98 F | OXYGEN SATURATION: 96 % | HEART RATE: 120 BPM | RESPIRATION RATE: 18 BRPM

## 2024-02-19 VITALS
SYSTOLIC BLOOD PRESSURE: 123 MMHG | TEMPERATURE: 98 F | RESPIRATION RATE: 18 BRPM | OXYGEN SATURATION: 98 % | HEART RATE: 62 BPM | DIASTOLIC BLOOD PRESSURE: 78 MMHG

## 2024-02-19 VITALS
SYSTOLIC BLOOD PRESSURE: 143 MMHG | HEART RATE: 98 BPM | RESPIRATION RATE: 18 BRPM | OXYGEN SATURATION: 100 % | TEMPERATURE: 99 F | DIASTOLIC BLOOD PRESSURE: 58 MMHG

## 2024-02-19 DIAGNOSIS — Z98.89 OTHER SPECIFIED POSTPROCEDURAL STATES: Chronic | ICD-10-CM

## 2024-02-19 DIAGNOSIS — Z90.49 ACQUIRED ABSENCE OF OTHER SPECIFIED PARTS OF DIGESTIVE TRACT: Chronic | ICD-10-CM

## 2024-02-19 DIAGNOSIS — Z90.5 ACQUIRED ABSENCE OF KIDNEY: Chronic | ICD-10-CM

## 2024-02-19 PROCEDURE — 99283 EMERGENCY DEPT VISIT LOW MDM: CPT

## 2024-02-19 RX ORDER — ACETAMINOPHEN 500 MG
650 TABLET ORAL ONCE
Refills: 0 | Status: COMPLETED | OUTPATIENT
Start: 2024-02-19 | End: 2024-02-19

## 2024-02-19 RX ADMIN — Medication 650 MILLIGRAM(S): at 07:44

## 2024-02-19 RX ADMIN — Medication 650 MILLIGRAM(S): at 08:14

## 2024-02-19 NOTE — ED PROVIDER NOTE - OBJECTIVE STATEMENT
73-year-old obese female presents to the ED complaining of right lower extremity bleeding.  Patient was seen earlier today she says informed staff that she scraped her leg and has been bleeding since.  Patient is on Eliquis.  patient applied Ace wrap and compressive

## 2024-02-19 NOTE — PROGRESS NOTE BEHAVIORAL HEALTH - NS ED BHA MSE SPEECH VOLUME
Chart and labs reviewed for length of stay. Patient receiving Ensure High Protein to support intake at this time, will increase to TID per patient request. RD available via consult. Will continue to follow per protocol.     
Normal
Normal

## 2024-02-19 NOTE — ED ADULT NURSE REASSESSMENT NOTE - NS ED NURSE REASSESS COMMENT FT1
Patient awake most of the night. Bandage changed x2 due to bleeding. Wrapped with ABD PAD and ACE bandage. Patient awaiting for  by family. No other concerns.

## 2024-02-19 NOTE — ED PROVIDER NOTE - CLINICAL SUMMARY MEDICAL DECISION MAKING FREE TEXT BOX
73-year-old obese female presents to the ED complaining of right lower extremity bleeding.  Patient was seen earlier today she says informed staff that she scraped her leg and has been bleeding since.  Patient is on Eliquis.  patient applied Ace wrap and compressive    bleeding from RLE- easily control with mild compression. no sign of infection. xeroform, ace wrap, washed, and reapplied with abd pad and gauze and ace wrap. bleeding controll.ed 3 days of supplies given. advise to keep on until tomorrow. ok to continue with ac- ambulance picked up

## 2024-02-19 NOTE — ED ADULT NURSE NOTE - OBJECTIVE STATEMENT
Pt presents to the ED c/o right LE skin bleeding at home, Pt reports bleeding has stopped. RLE wrapped in ace bandage, clean and dry. Pt denies pain/discomfort.

## 2024-02-19 NOTE — ED PROVIDER NOTE - PHYSICAL EXAMINATION
RLE- anterior distal tibia- large skin avulsion flap with capillary bleeding. easily control with compression.  b/l lower extremity venous insufficiency

## 2024-02-19 NOTE — ED PROVIDER NOTE - PATIENT PORTAL LINK FT
You can access the FollowMyHealth Patient Portal offered by [facility] by registering at the following website: [email or web address]. By joining [employer]’s FollowMyHealth portal, you will also be able to view your health information using other applications (apps) compatible with our system.

## 2024-02-20 ENCOUNTER — INPATIENT (INPATIENT)
Facility: HOSPITAL | Age: 74
LOS: 7 days | Discharge: NURSING, REHAB OR LONG-TERM CARE | DRG: 871 | End: 2024-02-28
Attending: INTERNAL MEDICINE | Admitting: INTERNAL MEDICINE
Payer: MEDICARE

## 2024-02-20 VITALS
WEIGHT: 229.28 LBS | HEIGHT: 62 IN | DIASTOLIC BLOOD PRESSURE: 56 MMHG | OXYGEN SATURATION: 87 % | HEART RATE: 74 BPM | SYSTOLIC BLOOD PRESSURE: 88 MMHG | RESPIRATION RATE: 22 BRPM

## 2024-02-20 DIAGNOSIS — Z98.89 OTHER SPECIFIED POSTPROCEDURAL STATES: Chronic | ICD-10-CM

## 2024-02-20 DIAGNOSIS — A41.9 SEPSIS, UNSPECIFIED ORGANISM: ICD-10-CM

## 2024-02-20 DIAGNOSIS — Z90.49 ACQUIRED ABSENCE OF OTHER SPECIFIED PARTS OF DIGESTIVE TRACT: Chronic | ICD-10-CM

## 2024-02-20 DIAGNOSIS — Z90.5 ACQUIRED ABSENCE OF KIDNEY: Chronic | ICD-10-CM

## 2024-02-20 LAB
ABO RH CONFIRMATION: SIGNIFICANT CHANGE UP
ALBUMIN SERPL ELPH-MCNC: 1.9 G/DL — LOW (ref 3.5–5)
ALBUMIN SERPL ELPH-MCNC: 2 G/DL — LOW (ref 3.5–5)
ALBUMIN SERPL ELPH-MCNC: 2.3 G/DL — LOW (ref 3.5–5)
ALP SERPL-CCNC: 107 U/L — SIGNIFICANT CHANGE UP (ref 40–120)
ALP SERPL-CCNC: 84 U/L — SIGNIFICANT CHANGE UP (ref 40–120)
ALP SERPL-CCNC: 90 U/L — SIGNIFICANT CHANGE UP (ref 40–120)
ALT FLD-CCNC: 34 U/L DA — SIGNIFICANT CHANGE UP (ref 10–60)
ALT FLD-CCNC: 35 U/L DA — SIGNIFICANT CHANGE UP (ref 10–60)
ALT FLD-CCNC: 37 U/L DA — SIGNIFICANT CHANGE UP (ref 10–60)
ANION GAP SERPL CALC-SCNC: 10 MMOL/L — SIGNIFICANT CHANGE UP (ref 5–17)
ANION GAP SERPL CALC-SCNC: 10 MMOL/L — SIGNIFICANT CHANGE UP (ref 5–17)
ANION GAP SERPL CALC-SCNC: 7 MMOL/L — SIGNIFICANT CHANGE UP (ref 5–17)
APPEARANCE UR: ABNORMAL
APTT BLD: 43 SEC — HIGH (ref 24.5–35.6)
AST SERPL-CCNC: 53 U/L — HIGH (ref 10–40)
AST SERPL-CCNC: 53 U/L — HIGH (ref 10–40)
AST SERPL-CCNC: 56 U/L — HIGH (ref 10–40)
BACTERIA # UR AUTO: ABNORMAL /HPF
BASE EXCESS BLDV CALC-SCNC: -2.6 MMOL/L — SIGNIFICANT CHANGE UP
BASOPHILS # BLD AUTO: 0.16 K/UL — SIGNIFICANT CHANGE UP (ref 0–0.2)
BASOPHILS NFR BLD AUTO: 0.5 % — SIGNIFICANT CHANGE UP (ref 0–2)
BILIRUB SERPL-MCNC: 0.7 MG/DL — SIGNIFICANT CHANGE UP (ref 0.2–1.2)
BILIRUB SERPL-MCNC: 0.8 MG/DL — SIGNIFICANT CHANGE UP (ref 0.2–1.2)
BILIRUB SERPL-MCNC: 0.8 MG/DL — SIGNIFICANT CHANGE UP (ref 0.2–1.2)
BILIRUB UR-MCNC: ABNORMAL
BLD GP AB SCN SERPL QL: SIGNIFICANT CHANGE UP
BUN SERPL-MCNC: 38 MG/DL — HIGH (ref 7–18)
BUN SERPL-MCNC: 38 MG/DL — HIGH (ref 7–18)
BUN SERPL-MCNC: 40 MG/DL — HIGH (ref 7–18)
CALCIUM SERPL-MCNC: 7.9 MG/DL — LOW (ref 8.4–10.5)
CALCIUM SERPL-MCNC: 8.2 MG/DL — LOW (ref 8.4–10.5)
CALCIUM SERPL-MCNC: 8.6 MG/DL — SIGNIFICANT CHANGE UP (ref 8.4–10.5)
CHLORIDE SERPL-SCNC: 100 MMOL/L — SIGNIFICANT CHANGE UP (ref 96–108)
CHLORIDE SERPL-SCNC: 103 MMOL/L — SIGNIFICANT CHANGE UP (ref 96–108)
CHLORIDE SERPL-SCNC: 104 MMOL/L — SIGNIFICANT CHANGE UP (ref 96–108)
CO2 SERPL-SCNC: 23 MMOL/L — SIGNIFICANT CHANGE UP (ref 22–31)
CO2 SERPL-SCNC: 24 MMOL/L — SIGNIFICANT CHANGE UP (ref 22–31)
CO2 SERPL-SCNC: 25 MMOL/L — SIGNIFICANT CHANGE UP (ref 22–31)
COD CRY URNS QL: PRESENT
COLOR SPEC: SIGNIFICANT CHANGE UP
CREAT ?TM UR-MCNC: 200 MG/DL — SIGNIFICANT CHANGE UP
CREAT SERPL-MCNC: 2.29 MG/DL — HIGH (ref 0.5–1.3)
CREAT SERPL-MCNC: 2.36 MG/DL — HIGH (ref 0.5–1.3)
CREAT SERPL-MCNC: 2.64 MG/DL — HIGH (ref 0.5–1.3)
DIFF PNL FLD: ABNORMAL
EGFR: 18 ML/MIN/1.73M2 — LOW
EGFR: 21 ML/MIN/1.73M2 — LOW
EGFR: 22 ML/MIN/1.73M2 — LOW
EOSINOPHIL # BLD AUTO: 0.04 K/UL — SIGNIFICANT CHANGE UP (ref 0–0.5)
EOSINOPHIL NFR BLD AUTO: 0.1 % — SIGNIFICANT CHANGE UP (ref 0–6)
EPI CELLS # UR: PRESENT
GLUCOSE SERPL-MCNC: 136 MG/DL — HIGH (ref 70–99)
GLUCOSE SERPL-MCNC: 140 MG/DL — HIGH (ref 70–99)
GLUCOSE SERPL-MCNC: 146 MG/DL — HIGH (ref 70–99)
GLUCOSE UR QL: NEGATIVE MG/DL — SIGNIFICANT CHANGE UP
HCO3 BLDV-SCNC: 25 MMOL/L — SIGNIFICANT CHANGE UP (ref 22–29)
HCT VFR BLD CALC: 21.9 % — LOW (ref 34.5–45)
HCT VFR BLD CALC: 25.6 % — LOW (ref 34.5–45)
HGB BLD-MCNC: 6.9 G/DL — CRITICAL LOW (ref 11.5–15.5)
HGB BLD-MCNC: 7.9 G/DL — LOW (ref 11.5–15.5)
HYALINE CASTS # UR AUTO: PRESENT
IMM GRANULOCYTES NFR BLD AUTO: 4.8 % — HIGH (ref 0–0.9)
INR BLD: 1.2 RATIO — HIGH (ref 0.85–1.18)
IRON SATN MFR SERPL: 34 % — SIGNIFICANT CHANGE UP (ref 15–50)
IRON SATN MFR SERPL: 62 UG/DL — SIGNIFICANT CHANGE UP (ref 40–150)
KETONES UR-MCNC: ABNORMAL MG/DL
LACTATE SERPL-SCNC: 0.9 MMOL/L — SIGNIFICANT CHANGE UP (ref 0.7–2)
LACTATE SERPL-SCNC: 2 MMOL/L — SIGNIFICANT CHANGE UP (ref 0.7–2)
LACTATE SERPL-SCNC: 5.4 MMOL/L — CRITICAL HIGH (ref 0.7–2)
LEUKOCYTE ESTERASE UR-ACNC: ABNORMAL
LYMPHOCYTES # BLD AUTO: 2.76 K/UL — SIGNIFICANT CHANGE UP (ref 1–3.3)
LYMPHOCYTES # BLD AUTO: 8.1 % — LOW (ref 13–44)
MAGNESIUM SERPL-MCNC: 1.3 MG/DL — LOW (ref 1.6–2.6)
MCHC RBC-ENTMCNC: 30.2 PG — SIGNIFICANT CHANGE UP (ref 27–34)
MCHC RBC-ENTMCNC: 30.9 GM/DL — LOW (ref 32–36)
MCHC RBC-ENTMCNC: 31.1 PG — SIGNIFICANT CHANGE UP (ref 27–34)
MCHC RBC-ENTMCNC: 31.5 GM/DL — LOW (ref 32–36)
MCV RBC AUTO: 97.7 FL — SIGNIFICANT CHANGE UP (ref 80–100)
MCV RBC AUTO: 98.6 FL — SIGNIFICANT CHANGE UP (ref 80–100)
MONOCYTES # BLD AUTO: 1.63 K/UL — HIGH (ref 0–0.9)
MONOCYTES NFR BLD AUTO: 4.8 % — SIGNIFICANT CHANGE UP (ref 2–14)
NEUTROPHILS # BLD AUTO: 28.01 K/UL — HIGH (ref 1.8–7.4)
NEUTROPHILS NFR BLD AUTO: 81.7 % — HIGH (ref 43–77)
NITRITE UR-MCNC: NEGATIVE — SIGNIFICANT CHANGE UP
NRBC # BLD: 2 /100 WBCS — HIGH (ref 0–0)
NRBC # BLD: 2 /100 WBCS — HIGH (ref 0–0)
NT-PROBNP SERPL-SCNC: 9463 PG/ML — HIGH (ref 0–450)
PCO2 BLDV: 53 MMHG — HIGH (ref 39–42)
PH BLDV: 7.28 — LOW (ref 7.32–7.43)
PH UR: 5 — SIGNIFICANT CHANGE UP (ref 5–8)
PHOSPHATE SERPL-MCNC: 4.4 MG/DL — SIGNIFICANT CHANGE UP (ref 2.5–4.5)
PLATELET # BLD AUTO: 314 K/UL — SIGNIFICANT CHANGE UP (ref 150–400)
PLATELET # BLD AUTO: 323 K/UL — SIGNIFICANT CHANGE UP (ref 150–400)
PO2 BLDV: 19 MMHG — SIGNIFICANT CHANGE UP
POTASSIUM SERPL-MCNC: 3.6 MMOL/L — SIGNIFICANT CHANGE UP (ref 3.5–5.3)
POTASSIUM SERPL-MCNC: 3.9 MMOL/L — SIGNIFICANT CHANGE UP (ref 3.5–5.3)
POTASSIUM SERPL-MCNC: 4.1 MMOL/L — SIGNIFICANT CHANGE UP (ref 3.5–5.3)
POTASSIUM SERPL-SCNC: 3.6 MMOL/L — SIGNIFICANT CHANGE UP (ref 3.5–5.3)
POTASSIUM SERPL-SCNC: 3.9 MMOL/L — SIGNIFICANT CHANGE UP (ref 3.5–5.3)
POTASSIUM SERPL-SCNC: 4.1 MMOL/L — SIGNIFICANT CHANGE UP (ref 3.5–5.3)
PROT SERPL-MCNC: 5.4 G/DL — LOW (ref 6–8.3)
PROT SERPL-MCNC: 5.5 G/DL — LOW (ref 6–8.3)
PROT SERPL-MCNC: 6.4 G/DL — SIGNIFICANT CHANGE UP (ref 6–8.3)
PROT UR-MCNC: 100 MG/DL
PROTHROM AB SERPL-ACNC: 13.6 SEC — HIGH (ref 9.5–13)
RBC # BLD: 2.22 M/UL — LOW (ref 3.8–5.2)
RBC # BLD: 2.62 M/UL — LOW (ref 3.8–5.2)
RBC # FLD: 16.5 % — HIGH (ref 10.3–14.5)
RBC # FLD: 16.7 % — HIGH (ref 10.3–14.5)
RBC CASTS # UR COMP ASSIST: 50 /HPF — HIGH (ref 0–4)
SAO2 % BLDV: 17.2 % — SIGNIFICANT CHANGE UP
SARS-COV-2 RNA SPEC QL NAA+PROBE: SIGNIFICANT CHANGE UP
SODIUM SERPL-SCNC: 135 MMOL/L — SIGNIFICANT CHANGE UP (ref 135–145)
SODIUM SERPL-SCNC: 135 MMOL/L — SIGNIFICANT CHANGE UP (ref 135–145)
SODIUM SERPL-SCNC: 136 MMOL/L — SIGNIFICANT CHANGE UP (ref 135–145)
SODIUM UR-SCNC: 8 MMOL/L — SIGNIFICANT CHANGE UP
SP GR SPEC: 1.02 — SIGNIFICANT CHANGE UP (ref 1–1.03)
TIBC SERPL-MCNC: 184 UG/DL — LOW (ref 250–450)
TROPONIN I, HIGH SENSITIVITY RESULT: 127.4 NG/L — HIGH
TROPONIN I, HIGH SENSITIVITY RESULT: 138.2 NG/L — HIGH
TSH SERPL-MCNC: 9.99 UU/ML — HIGH (ref 0.34–4.82)
UIBC SERPL-MCNC: 122 UG/DL — SIGNIFICANT CHANGE UP (ref 110–370)
UROBILINOGEN FLD QL: 1 MG/DL — SIGNIFICANT CHANGE UP (ref 0.2–1)
WBC # BLD: 34.23 K/UL — HIGH (ref 3.8–10.5)
WBC # BLD: 41.08 K/UL — CRITICAL HIGH (ref 3.8–10.5)
WBC # FLD AUTO: 34.23 K/UL — HIGH (ref 3.8–10.5)
WBC # FLD AUTO: 41.08 K/UL — CRITICAL HIGH (ref 3.8–10.5)
WBC UR QL: 15 /HPF — HIGH (ref 0–5)

## 2024-02-20 PROCEDURE — 71250 CT THORAX DX C-: CPT | Mod: 26,MA

## 2024-02-20 PROCEDURE — 74176 CT ABD & PELVIS W/O CONTRAST: CPT | Mod: 26,MA

## 2024-02-20 PROCEDURE — 71045 X-RAY EXAM CHEST 1 VIEW: CPT | Mod: 26

## 2024-02-20 PROCEDURE — 99291 CRITICAL CARE FIRST HOUR: CPT

## 2024-02-20 RX ORDER — ACETAMINOPHEN 500 MG
650 TABLET ORAL ONCE
Refills: 0 | Status: COMPLETED | OUTPATIENT
Start: 2024-02-20 | End: 2024-02-22

## 2024-02-20 RX ORDER — NOREPINEPHRINE BITARTRATE/D5W 8 MG/250ML
0.01 PLASTIC BAG, INJECTION (ML) INTRAVENOUS
Qty: 8 | Refills: 0 | Status: DISCONTINUED | OUTPATIENT
Start: 2024-02-20 | End: 2024-02-22

## 2024-02-20 RX ORDER — SODIUM CHLORIDE 9 MG/ML
500 INJECTION, SOLUTION INTRAVENOUS ONCE
Refills: 0 | Status: COMPLETED | OUTPATIENT
Start: 2024-02-20 | End: 2024-02-20

## 2024-02-20 RX ORDER — PANTOPRAZOLE SODIUM 20 MG/1
40 TABLET, DELAYED RELEASE ORAL
Refills: 0 | Status: DISCONTINUED | OUTPATIENT
Start: 2024-02-20 | End: 2024-02-21

## 2024-02-20 RX ORDER — MONTELUKAST 4 MG/1
1 TABLET, CHEWABLE ORAL
Refills: 0 | DISCHARGE

## 2024-02-20 RX ORDER — VANCOMYCIN HCL 1 G
1000 VIAL (EA) INTRAVENOUS ONCE
Refills: 0 | Status: COMPLETED | OUTPATIENT
Start: 2024-02-20 | End: 2024-02-20

## 2024-02-20 RX ORDER — AMLODIPINE BESYLATE 2.5 MG/1
1 TABLET ORAL
Refills: 0 | DISCHARGE

## 2024-02-20 RX ORDER — TIOTROPIUM BROMIDE 18 UG/1
1 CAPSULE ORAL; RESPIRATORY (INHALATION)
Refills: 0 | DISCHARGE

## 2024-02-20 RX ORDER — CEFEPIME 1 G/1
2000 INJECTION, POWDER, FOR SOLUTION INTRAMUSCULAR; INTRAVENOUS ONCE
Refills: 0 | Status: COMPLETED | OUTPATIENT
Start: 2024-02-20 | End: 2024-02-20

## 2024-02-20 RX ORDER — LEVOTHYROXINE SODIUM 125 MCG
1 TABLET ORAL
Refills: 0 | DISCHARGE

## 2024-02-20 RX ORDER — LEVOTHYROXINE SODIUM 125 MCG
87.5 TABLET ORAL AT BEDTIME
Refills: 0 | Status: DISCONTINUED | OUTPATIENT
Start: 2024-02-20 | End: 2024-02-21

## 2024-02-20 RX ORDER — SODIUM CHLORIDE 9 MG/ML
2000 INJECTION INTRAMUSCULAR; INTRAVENOUS; SUBCUTANEOUS ONCE
Refills: 0 | Status: COMPLETED | OUTPATIENT
Start: 2024-02-20 | End: 2024-02-20

## 2024-02-20 RX ORDER — OLANZAPINE 15 MG/1
1 TABLET, FILM COATED ORAL
Refills: 0 | DISCHARGE

## 2024-02-20 RX ORDER — ATORVASTATIN CALCIUM 80 MG/1
1 TABLET, FILM COATED ORAL
Refills: 0 | DISCHARGE

## 2024-02-20 RX ORDER — CEFEPIME 1 G/1
2000 INJECTION, POWDER, FOR SOLUTION INTRAMUSCULAR; INTRAVENOUS EVERY 12 HOURS
Refills: 0 | Status: DISCONTINUED | OUTPATIENT
Start: 2024-02-20 | End: 2024-02-21

## 2024-02-20 RX ORDER — SODIUM CHLORIDE 9 MG/ML
1000 INJECTION INTRAMUSCULAR; INTRAVENOUS; SUBCUTANEOUS ONCE
Refills: 0 | Status: DISCONTINUED | OUTPATIENT
Start: 2024-02-20 | End: 2024-02-20

## 2024-02-20 RX ORDER — ASPIRIN/CALCIUM CARB/MAGNESIUM 324 MG
1 TABLET ORAL
Refills: 0 | DISCHARGE

## 2024-02-20 RX ORDER — SODIUM CHLORIDE 9 MG/ML
1000 INJECTION, SOLUTION INTRAVENOUS ONCE
Refills: 0 | Status: COMPLETED | OUTPATIENT
Start: 2024-02-20 | End: 2024-02-20

## 2024-02-20 RX ADMIN — PANTOPRAZOLE SODIUM 40 MILLIGRAM(S): 20 TABLET, DELAYED RELEASE ORAL at 18:40

## 2024-02-20 RX ADMIN — SODIUM CHLORIDE 2000 MILLILITER(S): 9 INJECTION INTRAMUSCULAR; INTRAVENOUS; SUBCUTANEOUS at 11:33

## 2024-02-20 RX ADMIN — SODIUM CHLORIDE 2000 MILLILITER(S): 9 INJECTION INTRAMUSCULAR; INTRAVENOUS; SUBCUTANEOUS at 12:33

## 2024-02-20 RX ADMIN — Medication 87.5 MICROGRAM(S): at 22:03

## 2024-02-20 RX ADMIN — Medication 1.95 MICROGRAM(S)/KG/MIN: at 14:56

## 2024-02-20 RX ADMIN — SODIUM CHLORIDE 2000 MILLILITER(S): 9 INJECTION, SOLUTION INTRAVENOUS at 22:07

## 2024-02-20 RX ADMIN — CEFEPIME 100 MILLIGRAM(S): 1 INJECTION, POWDER, FOR SOLUTION INTRAMUSCULAR; INTRAVENOUS at 11:52

## 2024-02-20 RX ADMIN — Medication 1000 MILLIGRAM(S): at 13:59

## 2024-02-20 RX ADMIN — CEFEPIME 100 MILLIGRAM(S): 1 INJECTION, POWDER, FOR SOLUTION INTRAMUSCULAR; INTRAVENOUS at 22:02

## 2024-02-20 RX ADMIN — CEFEPIME 2000 MILLIGRAM(S): 1 INJECTION, POWDER, FOR SOLUTION INTRAMUSCULAR; INTRAVENOUS at 12:51

## 2024-02-20 RX ADMIN — Medication 250 MILLIGRAM(S): at 12:59

## 2024-02-20 NOTE — ED PROVIDER NOTE - OBJECTIVE STATEMENT
73 year old female, from home, wheelchair-bound, w/ PMHx COPD on 3 L NC at home, Obesity, Hypothyroidism, Diastolic HF on Lasix, Breast cancer s/p surgery, Asthma, Chronic lymphedema, Kidney cancer s/p partial nephrectomy, present to ed for hypotension, hypoxia since last night. pt was seen here past 2 days for RLE bleeding skin avulsion. had compressive bandage placed. pt went home and was unable to get out of chair. this am RLE bleeding continue. pt offers no complains and didn't take any of her meds.

## 2024-02-20 NOTE — H&P ADULT - ASSESSMENT
ASSESSMENT    HPI: 73 year old female AAOX3, from home, wheelchair-bound and uses cane occasionally , w/ PMHx COPD on 3 L NC at home, Obesity, Hypothyroidism, Diastolic HF on Lasix, Breast cancer s/p surgery, Asthma, Chronic lymphedema, Kidney cancer s/p partial nephrectomy discharged from [facility] on 2/16/24 for chf exacerbation and new Afib with RVR discharged on Eliquis presenting to ED after a fall at home earlier today admitted for hypotension likely septic vs hypovolemic shock     _________CNS___________  #Pain  -Acute rib fractures  -Tylenol PRN     _________CVS___________  #Septic Shock vs Hypovolemic shock?   -UA positive for infection, c/w abx  -Hb 7, baseline around 12 in beginning of february 2024   -s/p 2L NS in ED. WIll order another 1LNS  -started on peripheral levo in ED, titrate off. if not possible, place Central line   -f/u cultures, f/u echo      #CHF   -bnp 10 K   -f/u echo  -hold home meds while NPO incase of urgent HD     #AFib with RVR  -hold all anticoagulation     _________RESP__________  #COPD  -on 3LNC at home  -no acute issues    ___________GI____________  #GI bleed?  -pt denies any blood in stool or any bloody vomit  -drop in Hb possibly GI  -repeat CBC stat  -keep NPO  -Protonix BID,     ________ RENAL__________  #ALDAIR  -likely prerenal  -f/u urine lytes  -IVF     __________MSK___________  #Fall  -PT consult      ___________ID____________  #Septic shock?  -source UTI  -abx cefepime  -f;u urine cx and blood cx     _________ENDO__________  #hypothyroidism  -f/u TSH  -c/w synthroid ( IV while NPO)     ______HEME/ONC_______  #Anemia  -Hb 7, baseline 12.5 around begining of february however Hb trended down prior to discharge a few days ago ( was 8.6 day of d/c)   -repeat CBC stat  -f/u iron studies  -transfuse PRBC if Hb  <7     _________SKIN____________  #RLE ulcer  -podiatry consult  -vanc  -dressing change    #Sacral ulcer  -frequent positioning      ________PROPHY_______  #DVT SCD  #GI PPI      ______GOC/DISPO___________  ICU  FULL CODE        ASSESSMENT    HPI: 73 year old female AAOX3, from home, wheelchair-bound and uses cane occasionally , w/ PMHx COPD on 3 L NC at home, Obesity, Hypothyroidism, Diastolic HF on Lasix, Breast cancer s/p surgery, Asthma, Chronic lymphedema, Kidney cancer s/p partial nephrectomy discharged from [facility] on 2/16/24 for chf exacerbation and new Afib with RVR discharged on Eliquis presenting to ED after a fall at home earlier today admitted for hypotension likely septic vs hypovolemic shock     _________CNS___________  #Pain  -Acute rib fractures  -Tylenol PRN     _________CVS___________  #Septic Shock vs Hypovolemic shock?   -UA positive for infection, c/w abx  -Hb 7, baseline around 12 in beginning of february 2024   -s/p 2L NS in ED. WIll order another 1LNS  -started on peripheral levo in ED, titrate off. if not possible, place Central line   -f/u cultures, f/u echo      #CHF   -bnp 10 K   -f/u echo  -hold home meds while NPO incase of urgent HD     #AFib with RVR  -hold all anticoagulation     _________RESP__________  #COPD  -on 3LNC at home  -no acute issues    ___________GI____________  #GI bleed?  -pt denies any blood in stool or any bloody vomit  -drop in Hb possibly GI  -repeat CBC stat  -keep NPO  -Protonix BID,     ________ RENAL__________  #ALDAIR  -likely prerenal  -f/u urine lytes  -IVF   FC    __________MSK___________  #Fall  -PT consult      ___________ID____________  #Septic shock?  -source UTI  -abx cefepime  -f;u urine cx and blood cx     _________ENDO__________  #hypothyroidism  -f/u TSH  -c/w synthroid ( IV while NPO)     ______HEME/ONC_______  #Anemia  -Hb 7, baseline 12.5 around begining of february however Hb trended down prior to discharge a few days ago ( was 8.6 day of d/c)   -repeat CBC stat  -f/u iron studies  -transfuse PRBC if Hb  <7     _________SKIN____________  #RLE ulcer  -podiatry consult  s/p 1 dose vanc in ED   -dressing change    #Sacral ulcer  -frequent positioning      ________PROPHY_______  #DVT SCD  #GI PPI      ______GOC/DISPO___________  ICU  FULL CODE

## 2024-02-20 NOTE — ED PROVIDER NOTE - SKIN, MLM
RLE- distal anterior tibia 7cm x 4 cm skin avulsion with venous capillary oozing RLE- distal anterior tibia 7cm x 4 cm skin avulsion with venous capillary oozing, diffuse hematoma on skin surfaces

## 2024-02-20 NOTE — ED PROVIDER NOTE - PROGRESS NOTE DETAILS
Pantoja: Acute fractures of the posterior left 11th and 12th ribs, 11th rib fracture displaced/overlapping and 12th rib fracture nondisplaced. Numerous bilateral chronic and subacute/chronic bilateral rib fractures.  - No falls per patient.  Patient admits having diarrhea since last evening.  Otherwise no complaints.  Lactate elevated. CT shows as above. wbc 34, trop elevated but likely demand. - + shelley. acosta: Started on Levophed.  Blood pressure improved.  ICU called.  UA urine positive for UTI.  Was admitted to ICU for sepsis secondary to UTI on low-dose levo.

## 2024-02-20 NOTE — ED ADULT NURSE NOTE - NSFALLHARMRISKINTERV_ED_ALL_ED

## 2024-02-20 NOTE — ED ADULT NURSE NOTE - NSSEPSISSUSPECTED_ED_A_ED
No Inflammation Suggestive Of Cancer Camouflage Histology Text: There was a dense lymphocytic infiltrate which prevented adequate histologic evaluation of adjacent structures. Yes

## 2024-02-20 NOTE — ED PROVIDER NOTE - CONSTITUTIONAL, MLM
normal... awake, alert, oriented to person, place, time/situation and in no apparent distress. drowsy, cool to touch

## 2024-02-20 NOTE — H&P ADULT - ATTENDING COMMENTS
73 year old female AAOX3, from home, wheelchair-bound and uses cane occasionally , w/ PMHx COPD on 3 L NC at home, Obesity, Hypothyroidism, Diastolic HF on Lasix, Breast cancer s/p surgery, Asthma, Chronic lymphedema, Kidney cancer s/p partial nephrectomy discharged from [facility] on 2/16/24 for chf exacerbation and new Afib with RVR discharged on Eliquis presenting to ED after a fall at home earlier today admitted for hypotension likely septic vs hypovolemic shock.    Exam with multiple cutaneous hematomas. Chronic pedal edema. States has been bruising for a while now. Was started on anticoagulation recently for new onset afib. Labs significant for ALDAIR, lactic acidosis, worsening anemia.     CT scans performed in the ED suggestive of multiple acute and chronic rib fractures. Cutaneous hematomas. UA noted to be positive, concerning for UTI.     Post volume resuscitation assessment performed, now on vasopressor support     Assessment:  1. Septic Shock  2. Lactic acidosis   3. Acute kidney injury likely ATN   4. UTI  5. Acute on chronic anemia   6. Multiple hematomas   7. Recurrent falls  8. Heart failure with preserved EF   9. COPD   10. Chronic afib     Plan:  - POCUS with collapsable IVC   - Will continue IV fluid resuscitation  - Hold diuretics and antihypertensives  - Hemodynamic monitoring  - Repeat ECHO   - Hold anticoagulation  - Trend hgb  - Obtain cultures  - Broad spectrum antibiotics  - Cont. home COPD medications  - Will admit to ICU  - Podiatry and wound care consult  - Admit to ICU

## 2024-02-20 NOTE — ED PROVIDER NOTE - CLINICAL SUMMARY MEDICAL DECISION MAKING FREE TEXT BOX
73 year old female, from home, wheelchair-bound, w/ PMHx COPD on 3 L NC at home, Obesity, Hypothyroidism, Diastolic HF on Lasix, Breast cancer s/p surgery, Asthma, Chronic lymphedema, Kidney cancer s/p partial nephrectomy, present to ed for hypotension, hypoxia since last night. pt was seen here past 2 days for RLE bleeding skin avulsion. had compressive bandage placed. pt went home and was unable to get out of chair. this am RLE bleeding continue. pt offers no complains and didn't take any of her meds.    r/o anemia vs sepsis vs dehydration - labs, cxr, changed dsg and applied surgicel to area and ace wrap. admit

## 2024-02-20 NOTE — H&P ADULT - CONVERSATION DETAILS
Spoke with patient regarding patients diagnosis and prognosis: patient states she would like everything done to prolong her life including CPR and Intubation. Patient verbalized understanding.   FULL CODE

## 2024-02-20 NOTE — CHART NOTE - NSCHARTNOTEFT_GEN_A_CORE
Patient is a member of [facility] CHF list. Follow up appointment made with Dr. Juan Salcedo 2/27/2024 12:40pm.

## 2024-02-20 NOTE — ED ADULT NURSE NOTE - OBJECTIVE STATEMENT
Pt c/o bleeding wound to right leg. Pt c/o bleeding wound to right leg. Swelling noted to bilateral upper and lower extremities. Multiple wounds and redness noted to bilateral buttocks. Discoloration noted to multiple areas all over body.

## 2024-02-20 NOTE — ED PROVIDER NOTE - CRITICAL CARE ATTENDING CONTRIBUTION TO CARE
Pantoja: Due to the presence of hypotension and the risk of sudden rapid deterioration due to my attendance to this patient required critical care time of approx 40 minutes including assessment/reassessment, documentation, ordering and interpreting ancillary studies, discussion with ED staff and consultants, patient and their family, and excludes time spent in teaching of Residents and time spent on separately billable procedures.

## 2024-02-20 NOTE — ED ADULT TRIAGE NOTE - BP NONINVASIVE SYSTOLIC (MM HG)
Requested Prescriptions     Pending Prescriptions Disp Refills    HYDROcodone-acetaminophen (NORCO)  MG per tablet 90 tablet 0     Sig: Take 1 tablet by mouth every 8 hours as needed for Pain for up to 30 days.  Intended supply: 30 days    pramipexole (MIRAPEX) 0.25 MG tablet 90 tablet 3     Sig: Take 1 tablet by mouth 3 times daily    meloxicam (MOBIC) 15 MG tablet 30 tablet 5     Sig: Take 1 tablet by mouth daily    metoprolol succinate (TOPROL XL) 25 MG extended release tablet 30 tablet 3     Sig: Take 1 tablet by mouth nightly 88

## 2024-02-20 NOTE — H&P ADULT - HISTORY OF PRESENT ILLNESS
HPI: 73 year old female AAOX3, from home, wheelchair-bound and uses cane occasionally , w/ PMHx COPD on 3 L NC at home, Obesity, Hypothyroidism, Diastolic HF on Lasix, Breast cancer s/p surgery, Asthma, Chronic lymphedema, Kidney cancer s/p partial nephrectomy discharged from Replaced by [facility] on 24 for chf exacerbation and new Afib with RVR discharged on Eliquis presenting to ED after a fall at home earlier today. Patient states that she was using her cane to ambulate before tripping and falling landing on her nose, resulting in a wound on her nose that started to bleed prompting her to ED. Patient denies losing consciousness or any preceding symptoms such as headaches, dizziness, and chest pain. Patient reports that she did not take her eliquis since leaving the hospital because she noticed many bruises around her body. She denies any fevers, chills, headaches, dizziness, chest pain, cough, SOB, abd pain, n/v, diarrhea, constipation, hematuria, dysuria, numbness, and weakness.   In ED VS: HR 74, BP 88/56, RR 22, SPo2 87% 4LNC, WBC 34k, Hb 7.9, Cr 2.64, Trop 137, Lactate 5.4, BNP 10k, UA +, Acute 11-12 Rib fractures Left  s/p 2g cefepime, 1 g vanc, 2L NS bolus, started on levophed     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      History of carpal tunnel release  right wrist      History of parathyroidectomy      S/P laminectomy  now bed and wheelchair ridden with severe pain          SOCIAL HX:   Smoking                         ETOH                            Other    FAMILY HISTORY:  Family history of myocardial infarction  mother  at age 67 and father at age 67 of MI's    Family history of hypertension  mother and father    Family history of diabetes mellitus (Sibling)  siblings    Family history of heart disease (Sibling)  brother has a PPM    Family history of thyroid cancer (Child)  daughter has thyroid cancer    :  No known cardiovacular family hisotry     ROS:  See HPI     Allergies    Grapes (Hives)  sulfa drugs (Unknown)  Peaches (Hives)  shellfish (Hives)    Intolerances          PHYSICAL EXAM    ICU Vital Signs Last 24 Hrs  T(C): 36.4 (2024 13:46), Max: 37 (2024 16:27)  T(F): 97.6 (2024 13:46), Max: 98.6 (2024 16:27)  HR: 72 (2024 15:42) (64 - 98)  BP: 122/63 (2024 15:42) (81/59 - 143/58)  BP(mean): --  ABP: --  ABP(mean): --  RR: 16 (2024 15:19) (16 - 23)  SpO2: 100% (2024 15:19) (87% - 100%)    O2 Parameters below as of 2024 15:19  Patient On (Oxygen Delivery Method): nasal cannula  O2 Flow (L/min): 4          General: obese female lying in bed, pale appearing   HEENT:  dry mucus membranes, nose laceration c/d/i            Lymphatic system: No LN  Lungs: Bilateral BS  Cardiovascular: Regular  Gastrointestinal: Soft, Positive BS  Musculoskeletal: No clubbing.  Moves all extremities.    Skin: hematomas on abdomen, upper ext b/l. RLE wound   Neurological: No motor or sensory deficit         LABS:                          7.9    34.23 )-----------( 323      ( 2024 11:00 )             25.6                                               02-20    135  |  104  |  38<H>  ----------------------------<  136<H>  3.9   |  24  |  2.36<H>    Ca    7.9<L>      2024 14:45    TPro  5.4<L>  /  Alb  1.9<L>  /  TBili  0.7  /  DBili  x   /  AST  53<H>  /  ALT  34  /  AlkPhos  90  02-20      PT/INR - ( 2024 11:00 )   PT: 13.6 sec;   INR: 1.20 ratio         PTT - ( 2024 11:00 )  PTT:43.0 sec                                       Urinalysis Basic - ( 2024 14:45 )    Color: x / Appearance: x / SG: x / pH: x  Gluc: 136 mg/dL / Ketone: x  / Bili: x / Urobili: x   Blood: x / Protein: x / Nitrite: x   Leuk Esterase: x / RBC: x / WBC x   Sq Epi: x / Non Sq Epi: x / Bacteria: x                                                  LIVER FUNCTIONS - ( 2024 14:45 )  Alb: 1.9 g/dL / Pro: 5.4 g/dL / ALK PHOS: 90 U/L / ALT: 34 U/L DA / AST: 53 U/L / GGT: x                                                                                                                                            HPI: 73 year old female AAOX3, from home, wheelchair-bound and uses cane occasionally , w/ PMHx COPD on 3 L NC at home, Obesity, Hypothyroidism, Diastolic HF on Lasix, Breast cancer s/p surgery, Asthma, Chronic lymphedema, Kidney cancer s/p partial nephrectomy discharged from [facility] on 24 for chf exacerbation and new Afib with RVR discharged on Eliquis presenting to ED after a fall at home earlier today. Patient states that she was using her cane to ambulate before tripping and falling landing on her nose, resulting in a wound on her nose that started to bleed prompting her to ED. Patient denies losing consciousness or any preceding symptoms such as headaches, dizziness, and chest pain. Patient reports that she did not take her eliquis since leaving the hospital because she noticed many bruises around her body. She denies any fevers, chills, headaches, dizziness, chest pain, cough, SOB, abd pain, n/v, diarrhea, constipation, hematuria, dysuria, numbness, and weakness.   In ED VS: HR 74, BP 88/56, RR 22, SPo2 87% 4LNC, WBC 34k, Hb 7.9, Cr 2.64, Trop 137, Lactate 5.4, BNP 10k, UA +, Acute 11-12 Rib fractures Left  s/p 2g cefepime, 1 g vanc, 2L NS bolus, started on levophed     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      History of carpal tunnel release  right wrist      History of parathyroidectomy      S/P laminectomy  now bed and wheelchair ridden with severe pain          SOCIAL HX:   Smoking                         ETOH                            Other    FAMILY HISTORY:  Family history of myocardial infarction  mother  at age 67 and father at age 67 of MI's    Family history of hypertension  mother and father    Family history of diabetes mellitus (Sibling)  siblings    Family history of heart disease (Sibling)  brother has a PPM    Family history of thyroid cancer (Child)  daughter has thyroid cancer    :  No known cardiovacular family hisotry     ROS:  See HPI     Allergies    Grapes (Hives)  sulfa drugs (Unknown)  Peaches (Hives)  shellfish (Hives)    Intolerances          PHYSICAL EXAM    ICU Vital Signs Last 24 Hrs  T(C): 36.4 (2024 13:46), Max: 37 (2024 16:27)  T(F): 97.6 (2024 13:46), Max: 98.6 (2024 16:27)  HR: 72 (2024 15:42) (64 - 98)  BP: 122/63 (2024 15:42) (81/59 - 143/58)  BP(mean): --  ABP: --  ABP(mean): --  RR: 16 (2024 15:19) (16 - 23)  SpO2: 100% (2024 15:19) (87% - 100%)    O2 Parameters below as of 2024 15:19  Patient On (Oxygen Delivery Method): nasal cannula  O2 Flow (L/min): 4          General: obese female lying in bed, pale appearing   HEENT:  dry mucus membranes, nose laceration c/d/i            Lymphatic system: No LN  Lungs: Bilateral BS  Cardiovascular: Regular  Gastrointestinal: Soft, Positive BS  Musculoskeletal: No clubbing.  Moves all extremities.    Skin: hematomas on abdomen, upper ext b/l. RLE wound   Neurological: No motor or sensory deficit         LABS:                          7.9    34.23 )-----------( 323      ( 2024 11:00 )             25.6                                               02-20    135  |  104  |  38<H>  ----------------------------<  136<H>  3.9   |  24  |  2.36<H>    Ca    7.9<L>      2024 14:45    TPro  5.4<L>  /  Alb  1.9<L>  /  TBili  0.7  /  DBili  x   /  AST  53<H>  /  ALT  34  /  AlkPhos  90  02-20      PT/INR - ( 2024 11:00 )   PT: 13.6 sec;   INR: 1.20 ratio         PTT - ( 2024 11:00 )  PTT:43.0 sec                                       Urinalysis Basic - ( 2024 14:45 )    Color: x / Appearance: x / SG: x / pH: x  Gluc: 136 mg/dL / Ketone: x  / Bili: x / Urobili: x   Blood: x / Protein: x / Nitrite: x   Leuk Esterase: x / RBC: x / WBC x   Sq Epi: x / Non Sq Epi: x / Bacteria: x                                                  LIVER FUNCTIONS - ( 2024 14:45 )  Alb: 1.9 g/dL / Pro: 5.4 g/dL / ALK PHOS: 90 U/L / ALT: 34 U/L DA / AST: 53 U/L / GGT: x                                                                                                                                            HPI: 73 year old female AAOX3, from home, wheelchair-bound and uses cane occasionally , w/ PMHx COPD on 3 L NC at home, Obesity, Hypothyroidism, Diastolic HF on Lasix, Breast cancer s/p surgery, Asthma, Chronic lymphedema, Kidney cancer s/p partial nephrectomy discharged from [facility] on 24 for chf exacerbation and new Afib with RVR discharged on Eliquis presenting to ED after a fall at home earlier today. Patient states that she was using her cane to ambulate before tripping and falling landing on her nose, resulting in a wound on her nose that started to bleed prompting her to ED. Patient denies losing consciousness or any preceding symptoms such as headaches, dizziness, and chest pain. Patient reports that she did not take her eliquis since leaving the hospital because she noticed many bruises around her body. She denies any fevers, chills, headaches, dizziness, chest pain, cough, SOB, abd pain, n/v, diarrhea, constipation, hematuria, dysuria, numbness, and weakness.   In ED VS: HR 74, BP 88/56, RR 22, SPo2 87% 4LNC, WBC 34k, Hb 7.9, Cr 2.64, Trop 137, Lactate 5.4, BNP 10k, UA +, Acute 11-12 Rib fractures Left  s/p 2g cefepime, 1 g vanc, 2L NS bolus, started on levophed     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      History of carpal tunnel release  right wrist      History of parathyroidectomy      S/P laminectomy  now bed and wheelchair ridden with severe pain          SOCIAL HX:   Smoking        denies                 ETOH                            Other    FAMILY HISTORY:  Family history of myocardial infarction  mother  at age 67 and father at age 67 of MI's    Family history of hypertension  mother and father    Family history of diabetes mellitus (Sibling)  siblings    Family history of heart disease (Sibling)  brother has a PPM    Family history of thyroid cancer (Child)  daughter has thyroid cancer    :  No known cardiovacular family hisotry     ROS:  See HPI     Allergies    Grapes (Hives)  sulfa drugs (Unknown)  Peaches (Hives)  shellfish (Hives)    Intolerances          PHYSICAL EXAM    ICU Vital Signs Last 24 Hrs  T(C): 36.4 (2024 13:46), Max: 37 (2024 16:27)  T(F): 97.6 (2024 13:46), Max: 98.6 (2024 16:27)  HR: 72 (2024 15:42) (64 - 98)  BP: 122/63 (2024 15:42) (81/59 - 143/58)  BP(mean): --  ABP: --  ABP(mean): --  RR: 16 (2024 15:19) (16 - 23)  SpO2: 100% (2024 15:19) (87% - 100%)    O2 Parameters below as of 2024 15:19  Patient On (Oxygen Delivery Method): nasal cannula  O2 Flow (L/min): 4          General: obese female lying in bed, pale appearing   HEENT:  dry mucus membranes, nose laceration c/d/i            Lymphatic system: No LN  Lungs: Bilateral BS  Cardiovascular: Regular  Gastrointestinal: Soft, Positive BS  Musculoskeletal: No clubbing.  Moves all extremities.    Skin: hematomas on abdomen, upper ext b/l. RLE wound   Neurological: No motor or sensory deficit         LABS:                          7.9    34.23 )-----------( 323      ( 2024 11:00 )             25.6                                               02-20    135  |  104  |  38<H>  ----------------------------<  136<H>  3.9   |  24  |  2.36<H>    Ca    7.9<L>      2024 14:45    TPro  5.4<L>  /  Alb  1.9<L>  /  TBili  0.7  /  DBili  x   /  AST  53<H>  /  ALT  34  /  AlkPhos  90  0220      PT/INR - ( 2024 11:00 )   PT: 13.6 sec;   INR: 1.20 ratio         PTT - ( 2024 11:00 )  PTT:43.0 sec                                       Urinalysis Basic - ( 2024 14:45 )    Color: x / Appearance: x / SG: x / pH: x  Gluc: 136 mg/dL / Ketone: x  / Bili: x / Urobili: x   Blood: x / Protein: x / Nitrite: x   Leuk Esterase: x / RBC: x / WBC x   Sq Epi: x / Non Sq Epi: x / Bacteria: x                                                  LIVER FUNCTIONS - ( 2024 14:45 )  Alb: 1.9 g/dL / Pro: 5.4 g/dL / ALK PHOS: 90 U/L / ALT: 34 U/L DA / AST: 53 U/L / GGT: x

## 2024-02-20 NOTE — ED ADULT NURSE NOTE - AS PAIN REST
Caroline Shin PT   Electronically signed by Caroline Shin PT [id number] 
Cimetidine Pregnancy And Lactation Text: This medication is Pregnancy Category B and is considered safe during pregnancy. It is also excreted in breast milk and breast feeding isn't recommended.
5 (moderate pain)

## 2024-02-20 NOTE — CHART NOTE - NSCHARTNOTESELECT_GEN_ALL_CORE
Event Note
Discharge planning/Event Note
Event Note
F/u appointment/Event Note

## 2024-02-21 ENCOUNTER — RESULT REVIEW (OUTPATIENT)
Age: 74
End: 2024-02-21

## 2024-02-21 DIAGNOSIS — T14.8XXA OTHER INJURY OF UNSPECIFIED BODY REGION, INITIAL ENCOUNTER: ICD-10-CM

## 2024-02-21 DIAGNOSIS — R57.9 SHOCK, UNSPECIFIED: ICD-10-CM

## 2024-02-21 DIAGNOSIS — S22.39XA FRACTURE OF ONE RIB, UNSPECIFIED SIDE, INITIAL ENCOUNTER FOR CLOSED FRACTURE: ICD-10-CM

## 2024-02-21 LAB
ALBUMIN SERPL ELPH-MCNC: 1.8 G/DL — LOW (ref 3.5–5)
ALP SERPL-CCNC: 88 U/L — SIGNIFICANT CHANGE UP (ref 40–120)
ALT FLD-CCNC: 35 U/L DA — SIGNIFICANT CHANGE UP (ref 10–60)
ANION GAP SERPL CALC-SCNC: 10 MMOL/L — SIGNIFICANT CHANGE UP (ref 5–17)
AST SERPL-CCNC: 61 U/L — HIGH (ref 10–40)
BILIRUB SERPL-MCNC: 0.9 MG/DL — SIGNIFICANT CHANGE UP (ref 0.2–1.2)
BUN SERPL-MCNC: 33 MG/DL — HIGH (ref 7–18)
CALCIUM SERPL-MCNC: 7.1 MG/DL — LOW (ref 8.4–10.5)
CHLORIDE SERPL-SCNC: 109 MMOL/L — HIGH (ref 96–108)
CO2 SERPL-SCNC: 17 MMOL/L — LOW (ref 22–31)
CREAT SERPL-MCNC: 1.56 MG/DL — HIGH (ref 0.5–1.3)
EGFR: 34 ML/MIN/1.73M2 — LOW
FERRITIN SERPL-MCNC: 634 NG/ML — HIGH (ref 13–330)
GLUCOSE SERPL-MCNC: 119 MG/DL — HIGH (ref 70–99)
HCT VFR BLD CALC: 28.4 % — LOW (ref 34.5–45)
HGB BLD-MCNC: 8.7 G/DL — LOW (ref 11.5–15.5)
MAGNESIUM SERPL-MCNC: 1.2 MG/DL — LOW (ref 1.6–2.6)
MCHC RBC-ENTMCNC: 30.1 PG — SIGNIFICANT CHANGE UP (ref 27–34)
MCHC RBC-ENTMCNC: 30.6 GM/DL — LOW (ref 32–36)
MCV RBC AUTO: 98.3 FL — SIGNIFICANT CHANGE UP (ref 80–100)
MRSA PCR RESULT.: SIGNIFICANT CHANGE UP
NRBC # BLD: 2 /100 WBCS — HIGH (ref 0–0)
PHOSPHATE SERPL-MCNC: 3.9 MG/DL — SIGNIFICANT CHANGE UP (ref 2.5–4.5)
PLATELET # BLD AUTO: 215 K/UL — SIGNIFICANT CHANGE UP (ref 150–400)
POTASSIUM SERPL-MCNC: 4.3 MMOL/L — SIGNIFICANT CHANGE UP (ref 3.5–5.3)
POTASSIUM SERPL-SCNC: 4.3 MMOL/L — SIGNIFICANT CHANGE UP (ref 3.5–5.3)
PROT SERPL-MCNC: 5.4 G/DL — LOW (ref 6–8.3)
RBC # BLD: 2.89 M/UL — LOW (ref 3.8–5.2)
RBC # FLD: 18.5 % — HIGH (ref 10.3–14.5)
S AUREUS DNA NOSE QL NAA+PROBE: SIGNIFICANT CHANGE UP
SODIUM SERPL-SCNC: 136 MMOL/L — SIGNIFICANT CHANGE UP (ref 135–145)
TRANSFERRIN SERPL-MCNC: 156 MG/DL — LOW (ref 200–360)
WBC # BLD: 34.98 K/UL — HIGH (ref 3.8–10.5)
WBC # FLD AUTO: 34.98 K/UL — HIGH (ref 3.8–10.5)

## 2024-02-21 RX ORDER — MEROPENEM 1 G/30ML
1000 INJECTION INTRAVENOUS EVERY 8 HOURS
Refills: 0 | Status: DISCONTINUED | OUTPATIENT
Start: 2024-02-21 | End: 2024-02-23

## 2024-02-21 RX ORDER — OLANZAPINE 15 MG/1
2.5 TABLET, FILM COATED ORAL ONCE
Refills: 0 | Status: COMPLETED | OUTPATIENT
Start: 2024-02-21 | End: 2024-02-21

## 2024-02-21 RX ORDER — PANTOPRAZOLE SODIUM 20 MG/1
40 TABLET, DELAYED RELEASE ORAL
Refills: 0 | Status: DISCONTINUED | OUTPATIENT
Start: 2024-02-21 | End: 2024-02-23

## 2024-02-21 RX ORDER — ACETAMINOPHEN 500 MG
650 TABLET ORAL EVERY 6 HOURS
Refills: 0 | Status: DISCONTINUED | OUTPATIENT
Start: 2024-02-21 | End: 2024-02-28

## 2024-02-21 RX ORDER — SODIUM CHLORIDE 9 MG/ML
1000 INJECTION, SOLUTION INTRAVENOUS ONCE
Refills: 0 | Status: COMPLETED | OUTPATIENT
Start: 2024-02-21 | End: 2024-02-21

## 2024-02-21 RX ORDER — MAGNESIUM SULFATE 500 MG/ML
1 VIAL (ML) INJECTION ONCE
Refills: 0 | Status: COMPLETED | OUTPATIENT
Start: 2024-02-21 | End: 2024-02-21

## 2024-02-21 RX ORDER — DEXMEDETOMIDINE HYDROCHLORIDE IN 0.9% SODIUM CHLORIDE 4 UG/ML
0.2 INJECTION INTRAVENOUS
Qty: 400 | Refills: 0 | Status: DISCONTINUED | OUTPATIENT
Start: 2024-02-21 | End: 2024-02-21

## 2024-02-21 RX ORDER — LEVOTHYROXINE SODIUM 125 MCG
125 TABLET ORAL DAILY
Refills: 0 | Status: DISCONTINUED | OUTPATIENT
Start: 2024-02-22 | End: 2024-02-28

## 2024-02-21 RX ORDER — SODIUM CHLORIDE 9 MG/ML
500 INJECTION, SOLUTION INTRAVENOUS ONCE
Refills: 0 | Status: DISCONTINUED | OUTPATIENT
Start: 2024-02-21 | End: 2024-02-21

## 2024-02-21 RX ADMIN — PANTOPRAZOLE SODIUM 40 MILLIGRAM(S): 20 TABLET, DELAYED RELEASE ORAL at 06:17

## 2024-02-21 RX ADMIN — Medication 100 GRAM(S): at 11:30

## 2024-02-21 RX ADMIN — OLANZAPINE 2.5 MILLIGRAM(S): 15 TABLET, FILM COATED ORAL at 19:00

## 2024-02-21 RX ADMIN — MEROPENEM 100 MILLIGRAM(S): 1 INJECTION INTRAVENOUS at 22:37

## 2024-02-21 RX ADMIN — PANTOPRAZOLE SODIUM 40 MILLIGRAM(S): 20 TABLET, DELAYED RELEASE ORAL at 17:26

## 2024-02-21 RX ADMIN — SODIUM CHLORIDE 2000 MILLILITER(S): 9 INJECTION, SOLUTION INTRAVENOUS at 06:18

## 2024-02-21 RX ADMIN — MEROPENEM 100 MILLIGRAM(S): 1 INJECTION INTRAVENOUS at 10:25

## 2024-02-21 RX ADMIN — Medication 650 MILLIGRAM(S): at 16:00

## 2024-02-21 RX ADMIN — Medication 1.95 MICROGRAM(S)/KG/MIN: at 06:18

## 2024-02-21 RX ADMIN — Medication 650 MILLIGRAM(S): at 17:39

## 2024-02-21 RX ADMIN — Medication 100 GRAM(S): at 07:21

## 2024-02-21 RX ADMIN — Medication 1.95 MICROGRAM(S)/KG/MIN: at 11:00

## 2024-02-21 RX ADMIN — CEFEPIME 100 MILLIGRAM(S): 1 INJECTION, POWDER, FOR SOLUTION INTRAMUSCULAR; INTRAVENOUS at 06:18

## 2024-02-21 RX ADMIN — DEXMEDETOMIDINE HYDROCHLORIDE IN 0.9% SODIUM CHLORIDE 5.2 MICROGRAM(S)/KG/HR: 4 INJECTION INTRAVENOUS at 15:43

## 2024-02-21 NOTE — PROGRESS NOTE ADULT - ASSESSMENT
ASSESSMENT    HPI: 73 year old female AAOX3, from home, wheelchair-bound and uses cane occasionally , w/ PMHx COPD on 3 L NC at home, Obesity, Hypothyroidism, Diastolic HF on Lasix, Breast cancer s/p surgery, Asthma, Chronic lymphedema, Kidney cancer s/p partial nephrectomy discharged from [facility] on 2/16/24 for chf exacerbation and new Afib with RVR discharged on Eliquis presenting to ED after a fall at home earlier today admitted for hypotension likely septic vs hypovolemic shock     _________CNS___________  #Pain  -Acute rib fractures  -Tylenol PRN   # AMS likely 2/2 delirium due to acute illness   - Patient AO x 2-3 , baseline is AO x 3     _________CVS___________  #Septic Shock vs Hypovolemic shock?   -UA positive for infection, c/w abx  -Hb 7, baseline around 12 in beginning of february 2024   - s/p 1 U PRBC   -s/p 2L NS in ED., s/p 500 cc overnight   -c/w  peripheral levo  -f/u cultures, f/u echo      #CHF   -bnp 10 K   -f/u echo  -hold home meds while NPO incase of urgent HD     #AFib with RVR  -hold all anticoagulation     # concern for pericardial effusion on POCUS  - f/u formal TTE     _________RESP__________  #COPD  -on 3LNC at home, satting well in home O2 dose  -no acute issues    ___________GI____________  #GI bleed?  - currently no signs of GIB  -pt denies any blood in stool or any bloody vomit  -drop in Hb possibly GI  -c/w monitoring HB   - resume diet   -Protonix BID,   - c/w monitoring for symptoms   - will hold PPx lovenox     ________ RENAL__________  #ALDAIR  -likely prerenal, resolving   -f/u Fe Urea   -IVF   FC    __________MSK___________  #Fall  -PT consult      ___________ID____________  #Septic shock?  -source UTI, Hx of ESBL UTI in 1/24   -abx cefepime> meropenem   -f;u urine cx and blood cx   INFx Dr Renetta Arreaga following     _________ENDO__________  #hypothyroidism  -TSH- TSH 9.99   -c/w synthroid ( 87 IV push while NPO)     ______HEME/ONC_______  #Anemia  -Hb 7, baseline 12.5 around begining of february however Hb trended down prior to discharge a few days ago ( was 8.6 day of d/c)   -repeat CBC stat  -f/u iron studies  -transfuse PRBC if Hb  <7     _________SKIN____________  #RLE ulcer  -podiatry consult  s/p 1 dose vanc in ED   -dressing change    #Sacral ulcer  -frequent positioning    # lines   2 peripheral IV placed 2/21  plan to place Ex dwell in LUE vs midline vs central line     ________PROPHY_______  #DVT SCD  #GI PPI      ______GOC/DISPO___________  ICU  FULL CODE

## 2024-02-21 NOTE — CONSULT NOTE ADULT - ASSESSMENT
Septic Shock  UTI  Leukocytosis  Fevers      Plan - Cont Meropenem 1gm iv q8hrs  await culture results.  Cont Pressor support.

## 2024-02-21 NOTE — CONSULT NOTE ADULT - SUBJECTIVE AND OBJECTIVE BOX
HPI:  HPI: 73 year old female AAOX3, from home, wheelchair-bound and uses cane occasionally , w/ PMHx COPD on 3 L NC at home, Obesity, Hypothyroidism, Diastolic HF on Lasix, Breast cancer s/p surgery, Asthma, Chronic lymphedema, Kidney cancer s/p partial nephrectomy discharged from [facility] on 24 for chf exacerbation and new Afib with RVR discharged on Eliquis presenting to ED after a fall at home earlier today. Patient states that she was using her cane to ambulate before tripping and falling landing on her nose, resulting in a wound on her nose that started to bleed prompting her to ED. Patient denies losing consciousness or any preceding symptoms such as headaches, dizziness, and chest pain. Patient reports that she did not take her eliquis since leaving the hospital because she noticed many bruises around her body. She denies any fevers, chills, headaches, dizziness, chest pain, cough, SOB, abd pain, n/v, diarrhea, constipation, hematuria, dysuria, numbness, and weakness.   In ED VS: HR 74, BP 88/56, RR 22, SPo2 87% 4LNC, WBC 34k, Hb 7.9, Cr 2.64, Trop 137, Lactate 5.4, BNP 10k, UA +, Acute 11-12 Rib fractures Left  s/p 2g cefepime, 1 g vanc, 2L NS bolus, started on levophed.                 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 650 milliGRAM(s) Oral every 6 hours PRN  acetaminophen   IVPB .. 650 milliGRAM(s) IV Intermittent once PRN  dexMEDEtomidine Infusion 0.2 MICROgram(s)/kG/Hr IV Continuous <Continuous>  meropenem  IVPB 1000 milliGRAM(s) IV Intermittent every 8 hours  norepinephrine Infusion 0.01 MICROgram(s)/kG/Min IV Continuous <Continuous>  pantoprazole  Injectable 40 milliGRAM(s) IV Push two times a day      SOCIAL HISTORY:  Smoker:  no  ETOH use:  no        FAMILY HISTORY:  Family history of myocardial infarction  mother  at age 67 and father at age 67 of MI's    Family history of hypertension  mother and father    Family history of diabetes mellitus (Sibling)  siblings    Family history of heart disease (Sibling)  brother has a PPM    Family history of thyroid cancer (Child)  daughter has thyroid cancer        VITALS:  Vital Signs Last 24 Hrs  T(C): 38 (2024 14:45), Max: 38 (2024 13:45)  T(F): 100.4 (2024 14:45), Max: 100.4 (2024 13:45)  HR: 97 (2024 14:45) (76 - 117)  BP: 117/49 (2024 14:45) (82/43 - 206/176)  BP(mean): 61 (2024 14:45) (49 - 187)  RR: 18 (2024 14:45) (12 - 34)  SpO2: 99% (2024 14:30) (71% - 100%)    Parameters below as of 2024 12:00  Patient On (Oxygen Delivery Method): nasal cannula  O2 Flow (L/min): 3      LABS/DIAGNOSTIC TESTS:                          8.7    34.98 )-----------( 215      ( 2024 04:28 )             28.4     WBC Count: 34.98 K/uL ( @ 04:28)  WBC Count: 41.08 K/uL ( @ 18:10)  WBC Count: 34.23 K/uL ( @ 11:00)          136  |  109<H>  |  33<H>  ----------------------------<  119<H>  4.3   |  17<L>  |  1.56<H>    Ca    7.1<L>      2024 04:28  Phos  3.9       Mg     1.2         TPro  5.4<L>  /  Alb  1.8<L>  /  TBili  0.9  /  DBili  x   /  AST  61<H>  /  ALT  35  /  AlkPhos  88        Urine Microscopic-Add On (NC) (24 @ 14:25)   Squamous Epithelial Cells: Present  Bacteria: Many /HPF  Calcium Oxalate Crystals: Present  White Blood Cell - Urine: 15 /HPF  Red Blood Cell - Urine: 50 /HPF  Hyaline Casts: PresentUrinalysis (24 @ 14:25)   pH Urine: 5.0  Glucose Qualitative, Urine: Negative mg/dL  Blood, Urine: Large  Color: Dark Yellow  Urine Appearance: Turbid  Bilirubin: Moderate  Ketone - Urine: Trace mg/dL  Specific Gravity: 1.023  Protein, Urine: 100 mg/dL  Urobilinogen: 1.0 mg/dL  Nitrite: Negative  Leukocyte Esterase Concentration: Moderate      LIVER FUNCTIONS - ( 2024 04:28 )  Alb: 1.8 g/dL / Pro: 5.4 g/dL / ALK PHOS: 88 U/L / ALT: 35 U/L DA / AST: 61 U/L / GGT: x             PT/INR - ( 2024 06:57 )   PT: 12.0 sec;   INR: 1.05 ratio         PTT - ( 2024 06:57 )  PTT:25.7 sec    LACTATE:Lactate, Blood: 0.9 mmol/L ( @ 18:10)      ABG -     CULTURES:   .Blood Blood-Heart   @ 07:10   No growth at 5 days  --  --                  RADIOLOGY:< from: CT Abdomen and Pelvis No Cont (24 @ 12:11) >  ACC: [id number] EXAM:  CT CHEST   ORDERED BY: JOSE COELHO     ACC: [id number] EXAM:  CT ABDOMEN AND PELVIS   ORDERED BY: JOSE COELHO     PROCEDURE DATE:  2024          INTERPRETATION:  CLINICAL INFORMATION: Sepsis    COMPARISON: Chest CT dated nd CT of the abdomen and pelvis   dated 2015    CONTRAST/COMPLICATIONS:  IV Contrast: NONE  Oral Contrast: NONE  Complications: None reported at time of study completion    PROCEDURE:  CT of the Chest, Abdomen and Pelvis was performed.  Sagittal and coronal reformats were performed.    FINDINGS:  CHEST:  LUNGS AND LARGE AIRWAYS: Patent central airways. Emphysema. Improved   appearance of previously noted basilar posterior left upper lobe   tree-in-bud opacities and left lower lobe consolidation. Small residual   branching opacities in the left lower lobe corresponding to location of   previously noted consolidation. Lower lobe subsegmental atelectasis. Mild   peripheral fibrotic changes. With few tiny calcified and granulomas.  PLEURA:No pleural effusion.  VESSELS: Aortic and arch vessel calcification. Enlarged main pulmonary   artery, suspect underlying pulmonary hypertension.  HEART: Cardiomegaly. No pericardial effusion. Aortic valvular   calcification.  MEDIASTINUM AND MIYA: No lymphadenopathy. Small hiatal hernia.  CHEST WALL AND LOWER NECK: Hemorrhagic changes within bilateral   pectoralis musculature and subjacent tissues.    ABDOMEN AND PELVIS:  LIVER: Within normal limits.  BILE DUCTS: Normal caliber.  GALLBLADDER: Cholecystectomy.  SPLEEN: Within normal limits.  PANCREAS: Partial fatty atrophy. Scattered calcifications, primarily in   the head.  ADRENALS: Within normal limits.  KIDNEYS/URETERS: Bilateral cysts. Mild nonobstructing nephrolithiasis,   punctate stone in each kidney.    BLADDER: Completely decompressed, coiled catheter in place.  REPRODUCTIVE ORGANS: Within normal limits. Coarse right adnexal   calcification.    BOWEL: No bowel obstruction. Appendix is normal. Scant colonic   diverticula.  PERITONEUM: No ascites.  VESSELS: Atherosclerotic changes.  RETROPERITONEUM/LYMPH NODES: No lymphadenopathy.  ABDOMINAL WALL: 7-8 cm fat-containing midline epigastric hernia and small   fat-containing umbilical hernia. Foci of hemorrhagic change within the   low ventral subcutaneous tissues and droplets of air, correlate for   history of subcutaneous injection. Associated skin thickening. Mild soft   tissue contusion in the left upper gluteal/flank region.  BONES: Acute fractures of the posterior left 11th and 12th ribs, 11th rib   fracture displaced/overlapping and 12th rib fracture nondisplaced.   Numerous bilateral chronic and subacute/chronic bilateral rib fractures.   Partially imaged cervicothoracic fusion hardware. For detailed   discussion, see CT of the cervical spine dated 2024. Degenerative   changes of the spine. Chronic right iliac bone defect presumably related   to prior bone graft procedure.    IMPRESSION:  Chest CT:  1.  Near-complete complete resolution of previously noted   pneumonia/airways disease.  2.  Acute posterior left 11th and 12th rib fractures  3.  Posttraumatic hemorrhagic changes within the chest wall musculature   and subcutaneous tissues.    Abdomen/pelvis:  1.  Foci of hemorrhagic changes within the ventral soft tissues likely   related to subcutaneous injection.  2.  Otherwise, no acute findings.            --- End of Report ---            KATYA NAVARRETE MD; Attending Radiologist  This document has been electronically signed. 2024  1:49PM    < end of copied text >        ROS  [  ] UNABLE TO ELICIT               HPI:  HPI: 73 year old female AAOX3, from home, wheelchair-bound and uses cane occasionally , w/ PMHx COPD on 3 L NC at home, Obesity, Hypothyroidism, Diastolic HF on Lasix, Breast cancer s/p surgery, Asthma, Chronic lymphedema, Kidney cancer s/p partial nephrectomy discharged from [facility] on 24 for chf exacerbation and new Afib with RVR discharged on Eliquis presenting to ED after a fall at home earlier today. Patient states that she was using her cane to ambulate before tripping and falling landing on her nose, resulting in a wound on her nose that started to bleed prompting her to ED. Patient denies losing consciousness or any preceding symptoms such as headaches, dizziness, and chest pain. Patient reports that she did not take her eliquis since leaving the hospital because she noticed many bruises around her body. She denies any fevers, chills, headaches, dizziness, chest pain, cough, SOB, abd pain, n/v, diarrhea, constipation, hematuria, dysuria, numbness, and weakness.   In ED VS: HR 74, BP 88/56, RR 22, SPo2 87% 4LNC, WBC 34k, Hb 7.9, Cr 2.64, Trop 137, Lactate 5.4, BNP 10k, UA +, Acute 11-12 Rib fractures Left  s/p 2g cefepime, 1 g vanc, 2L NS bolus, started on levophed.           History as above , asked to see this patient who is from home and wheelchair bound and on home oxygen with an extensive PMH and admitted after falling at home. She is currently admitted to the ICU as she was found to have septic shock and is currently on 1 pressor. She is not cooperative and is not answering questions appropriately or consistently. She was found to have a very high WBC count and some fevers and has a positive u/a. She is denying any coughing or SOB more than usual, she denies any diarrhea but keeps telling me to give her more juice and not answering a lot of questions.        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 650 milliGRAM(s) Oral every 6 hours PRN  acetaminophen   IVPB .. 650 milliGRAM(s) IV Intermittent once PRN  dexMEDEtomidine Infusion 0.2 MICROgram(s)/kG/Hr IV Continuous <Continuous>  meropenem  IVPB 1000 milliGRAM(s) IV Intermittent every 8 hours  norepinephrine Infusion 0.01 MICROgram(s)/kG/Min IV Continuous <Continuous>  pantoprazole  Injectable 40 milliGRAM(s) IV Push two times a day      SOCIAL HISTORY:  Smoker:  denies  ETOH use:  denies        FAMILY HISTORY:  Family history of myocardial infarction  mother  at age 67 and father at age 67 of MI's    Family history of hypertension  mother and father    Family history of diabetes mellitus (Sibling)  siblings    Family history of heart disease (Sibling)  brother has a PPM    Family history of thyroid cancer (Child)  daughter has thyroid cancer        VITALS:  Vital Signs Last 24 Hrs  T(C): 38 (2024 14:45), Max: 38 (2024 13:45)  T(F): 100.4 (2024 14:45), Max: 100.4 (2024 13:45)  HR: 97 (2024 14:45) (76 - 117)  BP: 117/49 (2024 14:45) (82/43 - 206/176)  BP(mean): 61 (2024 14:45) (49 - 187)  RR: 18 (2024 14:45) (12 - 34)  SpO2: 99% (2024 14:30) (71% - 100%)    Parameters below as of 2024 12:00  Patient On (Oxygen Delivery Method): nasal cannula  O2 Flow (L/min): 3      LABS/DIAGNOSTIC TESTS:                          8.7    34.98 )-----------( 215      ( 2024 04:28 )             28.4     WBC Count: 34.98 K/uL ( @ 04:28)  WBC Count: 41.08 K/uL ( @ 18:10)  WBC Count: 34.23 K/uL ( @ 11:00)          136  |  109<H>  |  33<H>  ----------------------------<  119<H>  4.3   |  17<L>  |  1.56<H>    Ca    7.1<L>      2024 04:28  Phos  3.9       Mg     1.2         TPro  5.4<L>  /  Alb  1.8<L>  /  TBili  0.9  /  DBili  x   /  AST  61<H>  /  ALT  35  /  AlkPhos  88        Urine Microscopic-Add On (NC) (24 @ 14:25)   Squamous Epithelial Cells: Present  Bacteria: Many /HPF  Calcium Oxalate Crystals: Present  White Blood Cell - Urine: 15 /HPF  Red Blood Cell - Urine: 50 /HPF  Hyaline Casts: PresentUrinalysis (24 @ 14:25)   pH Urine: 5.0  Glucose Qualitative, Urine: Negative mg/dL  Blood, Urine: Large  Color: Dark Yellow  Urine Appearance: Turbid  Bilirubin: Moderate  Ketone - Urine: Trace mg/dL  Specific Gravity: 1.023  Protein, Urine: 100 mg/dL  Urobilinogen: 1.0 mg/dL  Nitrite: Negative  Leukocyte Esterase Concentration: Moderate      LIVER FUNCTIONS - ( 2024 04:28 )  Alb: 1.8 g/dL / Pro: 5.4 g/dL / ALK PHOS: 88 U/L / ALT: 35 U/L DA / AST: 61 U/L / GGT: x             PT/INR - ( 2024 06:57 )   PT: 12.0 sec;   INR: 1.05 ratio         PTT - ( 2024 06:57 )  PTT:25.7 sec    LACTATE:Lactate, Blood: 0.9 mmol/L ( @ 18:10)      ABG -     CULTURES:                   RADIOLOGY:< from: CT Abdomen and Pelvis No Cont (02.20.24 @ 12:11) >  ACC: [id number] EXAM:  CT CHEST   ORDERED BY: JOSE COELHO     ACC: [id number] EXAM:  CT ABDOMEN AND PELVIS   ORDERED BY: JOSE COELHO     PROCEDURE DATE:  2024          INTERPRETATION:  CLINICAL INFORMATION: Sepsis    COMPARISON: Chest CT dated nd CT of the abdomen and pelvis   dated 2015    CONTRAST/COMPLICATIONS:  IV Contrast: NONE  Oral Contrast: NONE  Complications: None reported at time of study completion    PROCEDURE:  CT of the Chest, Abdomen and Pelvis was performed.  Sagittal and coronal reformats were performed.    FINDINGS:  CHEST:  LUNGS AND LARGE AIRWAYS: Patent central airways. Emphysema. Improved   appearance of previously noted basilar posterior left upper lobe   tree-in-bud opacities and left lower lobe consolidation. Small residual   branching opacities in the left lower lobe corresponding to location of   previously noted consolidation. Lower lobe subsegmental atelectasis. Mild   peripheral fibrotic changes. With few tiny calcified and granulomas.  PLEURA:No pleural effusion.  VESSELS: Aortic and arch vessel calcification. Enlarged main pulmonary   artery, suspect underlying pulmonary hypertension.  HEART: Cardiomegaly. No pericardial effusion. Aortic valvular   calcification.  MEDIASTINUM AND MIYA: No lymphadenopathy. Small hiatal hernia.  CHEST WALL AND LOWER NECK: Hemorrhagic changes within bilateral   pectoralis musculature and subjacent tissues.    ABDOMEN AND PELVIS:  LIVER: Within normal limits.  BILE DUCTS: Normal caliber.  GALLBLADDER: Cholecystectomy.  SPLEEN: Within normal limits.  PANCREAS: Partial fatty atrophy. Scattered calcifications, primarily in   the head.  ADRENALS: Within normal limits.  KIDNEYS/URETERS: Bilateral cysts. Mild nonobstructing nephrolithiasis,   punctate stone in each kidney.    BLADDER: Completely decompressed, coiled catheter in place.  REPRODUCTIVE ORGANS: Within normal limits. Coarse right adnexal   calcification.    BOWEL: No bowel obstruction. Appendix is normal. Scant colonic   diverticula.  PERITONEUM: No ascites.  VESSELS: Atherosclerotic changes.  RETROPERITONEUM/LYMPH NODES: No lymphadenopathy.  ABDOMINAL WALL: 7-8 cm fat-containing midline epigastric hernia and small   fat-containing umbilical hernia. Foci of hemorrhagic change within the   low ventral subcutaneous tissues and droplets of air, correlate for   history of subcutaneous injection. Associated skin thickening. Mild soft   tissue contusion in the left upper gluteal/flank region.  BONES: Acute fractures of the posterior left 11th and 12th ribs, 11th rib   fracture displaced/overlapping and 12th rib fracture nondisplaced.   Numerous bilateral chronic and subacute/chronic bilateral rib fractures.   Partially imaged cervicothoracic fusion hardware. For detailed   discussion, see CT of the cervical spine dated 2024. Degenerative   changes of the spine. Chronic right iliac bone defect presumably related   to prior bone graft procedure.    IMPRESSION:  Chest CT:  1.  Near-complete complete resolution of previously noted   pneumonia/airways disease.  2.  Acute posterior left 11th and 12th rib fractures  3.  Posttraumatic hemorrhagic changes within the chest wall musculature   and subcutaneous tissues.    Abdomen/pelvis:  1.  Foci of hemorrhagic changes within the ventral soft tissues likely   related to subcutaneous injection.  2.  Otherwise, no acute findings.            --- End of Report ---            KATYA NAVARRETE MD; Attending Radiologist  This document has been electronically signed. 2024  1:49PM    < end of copied text >        ROS  [  ] UNABLE TO ELICIT, limited secondary to patient not being cooperative.

## 2024-02-21 NOTE — CHART NOTE - NSCHARTNOTEFT_GEN_A_CORE
Pt was assessed at bedside after informed by RN that she is refusing meropenem. She was noted to be confused earlier int he day and required precedex and then zyprexa. She is calmer now but her thoughts are still reflecting confusion, she was hesitant to take the abx because they "were not from the doctor" and was worried what would happen if something happened and no physician was in the ICU, she was also concerned what would happen if the medication ran out, and she apparently thought there were small children on the unit. She was reoriented, and reassured that physician coverage in ICU is 24/7, that she is being monitored, and that additional doses of meds are in the pharmacy. She agreed to take the meropenem. Dr. Olvera and myself discussed putting in a central line given that pt has been requiring pressers. She refused, saying she does not want more IV's and is tired of getting stuck constantly for blood draws. She says she does not want IV in her neck. But she could not describe the benefits or risks of the procedure in laymen's terms. She appears to still be confused. Her BP is currently within acceptable parameters on peripheral levo. If her BP decreases to require central line will discuss with her again, in the mean time consent has been obtained from  Panfilo who is able to understand the risks and benefits of the procedure if necessary.

## 2024-02-21 NOTE — CHART NOTE - NSCHARTNOTEFT_GEN_A_CORE
PGY1 d/w NP Liborio     After 1 attempt at placing Extended dwell on LUE, patient began to refuse further lines / blood draws.     Mental status evaluation- Patient is alert, but confused and not oriented   " Yes, I want you to do everything you can including chest compressions and ventilator, but dare you put a line in me . Ill call the police and you will all go to [facility]. "    Spoke to patient's  ( Mr Hayes ) and son Sunny   Consent obtained from family for sedation for  modified midline vs central line insertion    Patient started on precedex , but failed to achieve sedation goal   Patients BP continued to drop, increased levophed     - Precedex discontinued   - IV zyprexa 2.5 mg STAT ordered  - Will continue to monitor and endorse to night team   - Unable to obtain line and draw labs

## 2024-02-21 NOTE — PROGRESS NOTE ADULT - SUBJECTIVE AND OBJECTIVE BOX
INTERVAL HPI/OVERNIGHT EVENTS:       PRESSORS: [ ] YES [ ] NO  WHICH:    ANTIBIOTICS:                  DATE STARTED:  ANTIBIOTICS:                  DATE STARTED:    Antimicrobial:  meropenem  IVPB 1000 milliGRAM(s) IV Intermittent every 8 hours    Cardiovascular:  norepinephrine Infusion 0.01 MICROgram(s)/kG/Min IV Continuous <Continuous>    Pulmonary:    Hematalogic:    Other:  acetaminophen   IVPB .. 650 milliGRAM(s) IV Intermittent once PRN  levothyroxine Injectable 87.5 MICROGram(s) IV Push at bedtime  pantoprazole  Injectable 40 milliGRAM(s) IV Push two times a day    acetaminophen   IVPB .. 650 milliGRAM(s) IV Intermittent once PRN  levothyroxine Injectable 87.5 MICROGram(s) IV Push at bedtime  meropenem  IVPB 1000 milliGRAM(s) IV Intermittent every 8 hours  norepinephrine Infusion 0.01 MICROgram(s)/kG/Min IV Continuous <Continuous>  pantoprazole  Injectable 40 milliGRAM(s) IV Push two times a day    Drug Dosing Weight  Height (cm): 157.5 (20 Feb 2024 10:36)  Weight (kg): 104 (20 Feb 2024 10:36)  BMI (kg/m2): 41.9 (20 Feb 2024 10:36)  BSA (m2): 2.03 (20 Feb 2024 10:36)    PHYSICAL EXAM:  GENERAL: NAD  EYES: EOMI, PERRLA  NECK: Supple, No JVD; Normal thyroid; Trachea midline: No LAD   NERVOUS SYSTEM:  Alert & Oriented X3,  Motor Strength 5/5 B/L upper and lower extremities; DTRs 2+ intact and symmetric  CHEST/LUNG: No rales, rhonchi, wheezing, breath sounds present bilaterally  HEART: Regular rate and rhythm; No murmurs, no gallops  ABDOMEN: Soft, Nontender, Nondistended; Bowel sounds present, no pain or masses on palpation  : voiding well, Pinzon in place  EXTREMITIES:  2+ Peripheral Pulses, No clubbing, cyanosis, or edema  SKIN: warm, intact, no lesions     LINES/DRAINS/DEVICES  CENTRAL LINE: [ ] YES [ ] NO  LOCATION:     PINZON: [ ] YES [ ] NO     A-LINE:  [ ] YES [ ] NO  LOCATION:       ICU Vital Signs Last 24 Hrs  T(C): 37.6 (21 Feb 2024 12:00), Max: 37.6 (21 Feb 2024 11:45)  T(F): 99.7 (21 Feb 2024 12:00), Max: 99.7 (21 Feb 2024 11:45)  HR: 108 (21 Feb 2024 12:00) (64 - 117)  BP: 133/119 (21 Feb 2024 12:00) (81/59 - 171/142)  BP(mean): 125 (21 Feb 2024 12:00) (49 - 152)  ABP: --  ABP(mean): --  RR: 20 (21 Feb 2024 12:00) (12 - 26)  SpO2: 98% (21 Feb 2024 12:00) (79% - 100%)    O2 Parameters below as of 21 Feb 2024 07:30  Patient On (Oxygen Delivery Method): nasal cannula  O2 Flow (L/min): 3                02-20 @ 07:01  -  02-21 @ 07:00  --------------------------------------------------------  IN: 1271.2 mL / OUT: 380 mL / NET: 891.2 mL              LABS:  CBC Full  -  ( 21 Feb 2024 04:28 )  WBC Count : 34.98 K/uL  RBC Count : 2.89 M/uL  Hemoglobin : 8.7 g/dL  Hematocrit : 28.4 %  Platelet Count - Automated : 215 K/uL  Mean Cell Volume : 98.3 fl  Mean Cell Hemoglobin : 30.1 pg  Mean Cell Hemoglobin Concentration : 30.6 gm/dL  Auto Neutrophil # : x  Auto Lymphocyte # : x  Auto Monocyte # : x  Auto Eosinophil # : x  Auto Basophil # : x  Auto Neutrophil % : x  Auto Lymphocyte % : x  Auto Monocyte % : x  Auto Eosinophil % : x  Auto Basophil % : x    02-21    136  |  109<H>  |  33<H>  ----------------------------<  119<H>  4.3   |  17<L>  |  1.56<H>    Ca    7.1<L>      21 Feb 2024 04:28  Phos  3.9     02-21  Mg     1.2     02-21    TPro  5.4<L>  /  Alb  1.8<L>  /  TBili  0.9  /  DBili  x   /  AST  61<H>  /  ALT  35  /  AlkPhos  88  02-21    PT/INR - ( 21 Feb 2024 06:57 )   PT: 12.0 sec;   INR: 1.05 ratio         PTT - ( 21 Feb 2024 06:57 )  PTT:25.7 sec  Urinalysis Basic - ( 21 Feb 2024 04:28 )    Color: x / Appearance: x / SG: x / pH: x  Gluc: 119 mg/dL / Ketone: x  / Bili: x / Urobili: x   Blood: x / Protein: x / Nitrite: x   Leuk Esterase: x / RBC: x / WBC x   Sq Epi: x / Non Sq Epi: x / Bacteria: x          RADIOLOGY & ADDITIONAL STUDIES REVIEWED DURING TEAM ROUNDS    [ ]GOALS OF CARE DISCUSSION WITH PATIENT/FAMILY/PROXY:    CRITICAL CARE TIME SPENT: 35 minutes   INTERVAL HPI/OVERNIGHT EVENTS: s/p 1 U PRBC , s/p 500cc bolus  for hypotension and labile BP   This morning patient states she is comfortable , feeling better   Patient's family (  Freddy - [phone]) updated bedside regarding clinical course, treatment and all questions / concerns addressed      PRESSORS: [ ] YES [ ] NO  WHICH:    ANTIBIOTICS:                  DATE STARTED:  ANTIBIOTICS:                  DATE STARTED:    Antimicrobial:  meropenem  IVPB 1000 milliGRAM(s) IV Intermittent every 8 hours    Cardiovascular:  norepinephrine Infusion 0.01 MICROgram(s)/kG/Min IV Continuous <Continuous>    Pulmonary:    Hematalogic:    Other:  acetaminophen   IVPB .. 650 milliGRAM(s) IV Intermittent once PRN  levothyroxine Injectable 87.5 MICROGram(s) IV Push at bedtime  pantoprazole  Injectable 40 milliGRAM(s) IV Push two times a day    acetaminophen   IVPB .. 650 milliGRAM(s) IV Intermittent once PRN  levothyroxine Injectable 87.5 MICROGram(s) IV Push at bedtime  meropenem  IVPB 1000 milliGRAM(s) IV Intermittent every 8 hours  norepinephrine Infusion 0.01 MICROgram(s)/kG/Min IV Continuous <Continuous>  pantoprazole  Injectable 40 milliGRAM(s) IV Push two times a day    Drug Dosing Weight  Height (cm): 157.5 (20 Feb 2024 10:36)  Weight (kg): 104 (20 Feb 2024 10:36)  BMI (kg/m2): 41.9 (20 Feb 2024 10:36)  BSA (m2): 2.03 (20 Feb 2024 10:36)    PHYSICAL EXAM:  GENERAL: NAD, obese habitus, chronic UE tremors  EYES: EOMI, PERRLA  NECK: Supple, No JVD; Normal thyroid; Trachea midline: No LAD   NERVOUS SYSTEM:  Alert & Oriented X2-3,forgetful, follows commands    CHEST/LUNG: No rales, rhonchi, wheezing, breath sounds present bilaterally, saturating well on 3 L NC  HEART: Regular rate and rhythm; No murmurs, no gallops  ABDOMEN: Soft, Nontender, Nondistended; Bowel sounds present, no pain or masses on palpation  : voiding well, Pinzon in place  EXTREMITIES:  2+ Peripheral Pulses, No clubbing, cyanosis, + non pitting BL pedal lymphedema, + anterior R leg wound - 4x4 cm with surrounding erythema , local warmth - NON tender    SKIN: warm, intact, no lesions     LINES/DRAINS/DEVICES  CENTRAL LINE: [ ] YES [ ] NO  LOCATION:     PINZON: [ ] YES [ ] NO     A-LINE:  [ ] YES [ ] NO  LOCATION:       ICU Vital Signs Last 24 Hrs  T(C): 37.6 (21 Feb 2024 12:00), Max: 37.6 (21 Feb 2024 11:45)  T(F): 99.7 (21 Feb 2024 12:00), Max: 99.7 (21 Feb 2024 11:45)  HR: 108 (21 Feb 2024 12:00) (64 - 117)  BP: 133/119 (21 Feb 2024 12:00) (81/59 - 171/142)  BP(mean): 125 (21 Feb 2024 12:00) (49 - 152)  ABP: --  ABP(mean): --  RR: 20 (21 Feb 2024 12:00) (12 - 26)  SpO2: 98% (21 Feb 2024 12:00) (79% - 100%)    O2 Parameters below as of 21 Feb 2024 07:30  Patient On (Oxygen Delivery Method): nasal cannula  O2 Flow (L/min): 3                02-20 @ 07:01  -  02-21 @ 07:00  --------------------------------------------------------  IN: 1271.2 mL / OUT: 380 mL / NET: 891.2 mL              LABS:  CBC Full  -  ( 21 Feb 2024 04:28 )  WBC Count : 34.98 K/uL  RBC Count : 2.89 M/uL  Hemoglobin : 8.7 g/dL  Hematocrit : 28.4 %  Platelet Count - Automated : 215 K/uL  Mean Cell Volume : 98.3 fl  Mean Cell Hemoglobin : 30.1 pg  Mean Cell Hemoglobin Concentration : 30.6 gm/dL  Auto Neutrophil # : x  Auto Lymphocyte # : x  Auto Monocyte # : x  Auto Eosinophil # : x  Auto Basophil # : x  Auto Neutrophil % : x  Auto Lymphocyte % : x  Auto Monocyte % : x  Auto Eosinophil % : x  Auto Basophil % : x    02-21    136  |  109<H>  |  33<H>  ----------------------------<  119<H>  4.3   |  17<L>  |  1.56<H>    Ca    7.1<L>      21 Feb 2024 04:28  Phos  3.9     02-21  Mg     1.2     02-21    TPro  5.4<L>  /  Alb  1.8<L>  /  TBili  0.9  /  DBili  x   /  AST  61<H>  /  ALT  35  /  AlkPhos  88  02-21    PT/INR - ( 21 Feb 2024 06:57 )   PT: 12.0 sec;   INR: 1.05 ratio         PTT - ( 21 Feb 2024 06:57 )  PTT:25.7 sec  Urinalysis Basic - ( 21 Feb 2024 04:28 )    Color: x / Appearance: x / SG: x / pH: x  Gluc: 119 mg/dL / Ketone: x  / Bili: x / Urobili: x   Blood: x / Protein: x / Nitrite: x   Leuk Esterase: x / RBC: x / WBC x   Sq Epi: x / Non Sq Epi: x / Bacteria: x          RADIOLOGY & ADDITIONAL STUDIES REVIEWED DURING TEAM ROUNDS    [ ]GOALS OF CARE DISCUSSION WITH PATIENT/FAMILY/PROXY:    CRITICAL CARE TIME SPENT: 35 minutes

## 2024-02-21 NOTE — CONSULT NOTE ADULT - SUBJECTIVE AND OBJECTIVE BOX
patient well known to me..   full note to follow   Dr. DIANE Welch ([phone])      ---___---___---___---___---___---___ ---___---___---___---___---___---___---___---___---                  M E D I C A L   A T T E N D I N G    C O N S U L T A T I O N   N O T E  ---___---___---___---___---___---___ ---___---___---___---___---___---___---___---___---                                      ( Note written / Date of service 24 ( This is certified to be the same as "ENTERED" date above ( for billing purposes)))       - Patient evaluated by me, chart reviewed.   ++CHIEF COMPLAINT:   Patient is a 73y old  Female who presented / admitted with Hypotension (2024 18:28)    ++  HPI:  patient is a 73 year old woman AAOX3, from home, wheelchair-bound and uses cane /walker occasionally , w/ PMHx COPD on 3 L NC at home, Obesity, Hypothyroidism, Diastolic HF on Lasix, Breast cancer s/p surgery, Asthma, Chronic lymphedema, anxiety, Kidney cancer s/p partial nephrectomy discharged from [facility] on 24 for chf exacerbation and new Afib with RVR discharged on Eliquis presented to ED after a fall at home..  Patient was using her cane to ambulate before tripping and falling landing on her nose, resulting in a wound on her nose that started to bleed prompting her to ED. Patient denied losing consciousness or any preceding symptoms such as headaches, dizziness, and chest pain. Patient reported that she did not take her eliquis since leaving the hospital because she noticed many bruises around her body.   of note patient was in ED for bleeding from a wound on extremity after hitting her wheelchair > was bandaged and discharged..     In ED VS: HR 74, BP 88/56, RR 22, SPo2 87% 4LNC, WBC 34k, Hb 7.9, Cr 2.64, Trop 137, Lactate 5.4, BNP 10k, UA +, Acute 11-12 Rib fractures Left  s/p 2g cefepime, 1 g vanc, 2L NS bolus, started on levophed     patient admitted to the MICU for further management     ---___---___---___---___---___---  PAST MEDICAL & SURGICAL HISTORY:    Hypertension  Hyperlipidemia  Anxiety  Graves disease  Kidney cancer, primary, with metastasis from kidney to other site, left  LEft sided- s/p nephrectomy only- no chemo or RT  Breast cancer in situ, left  COPD (chronic obstructive pulmonary disease)  Hypothyroid  DVT (deep venous thrombosis) history of-   Obesity  Sleep apnea  Asthma    S/P cholecystectomy  S/p nephrectomy left  S/P breast biopsy left  History of pelvic surgery Pelvic Sling  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  History of carpal tunnel release right wrist  History of parathyroidectomy  S/P laminectomy  now bed and wheelchair ridden with severe pain    ---___---___---___---___---___---   FAMILY HISTORY:    Family history of myocardial infarction  mother  at age 67 and father at age 67 of MI's    Family history of hypertension  mother and father    Family history of diabetes mellitus (Sibling)  siblings    Family history of heart disease (Sibling)  brother has a PPM    Family history of thyroid cancer (Child)  daughter has thyroid cancer    ---___---___---___---___---___---  SOCIAL HISTORY:  Alcohol: None reported  Smoking: None reported    ---___---___---___---___---___---  ALLERGIES:     Grapes (Hives)  sulfa drugs (Unknown)  Peaches (Hives)  shellfish (Hives)    ---___---___---___---___---___---  MEDICATIONS:  MEDICATIONS  (STANDING):  meropenem  IVPB 1000 milliGRAM(s) IV Intermittent every 8 hours  norepinephrine Infusion 0.01 MICROgram(s)/kG/Min (1.95 mL/Hr) IV Continuous <Continuous>  pantoprazole  Injectable 40 milliGRAM(s) IV Push two times a day    MEDICATIONS  (PRN):  acetaminophen     Tablet .. 650 milliGRAM(s) Oral every 6 hours PRN Temp greater or equal to 38C (100.4F), Mild Pain (1 - 3)  acetaminophen   IVPB .. 650 milliGRAM(s) IV Intermittent once PRN Temp greater or equal to 38C (100.4F), Mild Pain (1 - 3)    ___________  Active diet:  Diet, Regular:   Easy to Chew (EASYTOCHEW)  ___________________    HOME MEDICATIONS:  amLODIPine 5 mg oral tablet  aspirin 81 mg oral delayed release tablet  atorvastatin 40 mg oral tablet  busPIRone 10 mg oral tablet  levothyroxine 125 mcg (0.125 mg) oral tablet  montelukast 10 mg oral tablet  Multiple Vitamins oral tablet  OLANZapine 10 mg oral tablet  Spiriva HandiHaler 18 mcg inhalation capsule    ---___---___---___---___---___---  REVIEW OF SYSTEM:    GEN: no fever, no chills  RESP: no SOB, no cough, no sputum  CVS: no chest pain, no palpitations, chronic edema  GI: no abdominal pain, no nausea, no vomiting  : no dysuria, no frequency  Neuro: no headache, no dizziness  PSYCH: no anxiety, no depression  Derm : no itching, no rash    ---___---___---___---___---___---  VITAL SIGNS:  T(C): 37.5 (24 @ 21:15), Max: 38 (24 @ 13:45)  HR: 79 (24 @ 21:15) (73 - 117)  BP: 166/45 (24 @ 21:15) (54/19 - 206/176)  BP(mean): 78 (24 @ 21:15)  RR: 15 (24 @ 21:15) (12 - 34)  SpO2: 97% (24 @ 21:15) (71% - 100%)       POCT Blood Glucose.: 128 mg/dL (2024 06:22)  POCT Blood Glucose.: 140 mg/dL (2024 00:16)    I&O's Summary    2024 07:01  -  2024 07:00  --------------------------------------------------------  IN: 1271.2 mL / OUT: 380 mL / NET: 891.2 mL    2024 07:01  -  2024 21:32  --------------------------------------------------------  IN: 1105.4 mL / OUT: 515 mL / NET: 590.4 mL      ---___---___---___---___---___---  PHYSICAL EXAM:    GEN: A&O X 2-3 , NAD , comfortable, pleasant, calm   HEENT: NCAT, PERRL, MMM, hearing intact  Neck: supple , no JVD  CVS: S1S2 , regular , No M/R/G appreciated  PULM: CTA B/L,  limited exam due to body habitus. limited exam as not taking deep breaths   ABD.: soft. non tender, non distended,  obese   Extrem: intact pulses , no edema   Derm: No rash , scattered ecchymoses  PSYCH : normal mood,  no delusion not anxious     ---___---___---___---___---___---            LAB AND IMAGIN.7    34.98 )-----------( 215      ( 2024 04:28 )             28.4        Hemoglobin:   8.7 <<==,  6.9 <<==,  7.9 <<==    02-21    136  |  109<H>  |  33<H>  ----------------------------<  119<H>  4.3   |  17<L>  |  1.56<H>    Ca    7.1<L>      2024 04:28  Phos  3.9       Mg     1.2         TPro  5.4<L>  /  Alb  1.8<L>  /  TBili  0.9  /  DBili  x   /  AST  61<H>  /  ALT  35  /  AlkPhos  88      PT/INR - ( 2024 06:57 )   PT: 12.0 sec;   INR: 1.05 ratio         PTT - ( 2024 06:57 )  PTT:25.7 sec              Urinalysis:  24 @ 14:25  Leuk. Est.: Moderate  Nirite: Negative  WBC: 15  Blood: Large     salt Crystal: --     calcium crystal: Present     Bili: Moderate     cast: --  color: Dark Yellow  Bacteria: Many  Epith. cell: --  Yeast: --     Ketone: Trace     Protein: 100     Glucose: Negative     sperm: --     Spec.Gravity: 1.023    TSH:      9.99   (24)       ,     2.76   (24)           Lipid profile:  (24)     Total: 127     LDL  : (p)     HDL  :34     TG   :161     HgA1C:   (24)          (24)      6.4      < from: CT Abdomen and Pelvis No Cont (24 @ 12:11) >  IMPRESSION:  Chest CT:  1.  Near-complete complete resolution of previously noted   pneumonia/airways disease.  2.  Acute posterior left 11th and 12th rib fractures  3.  Posttraumatic hemorrhagic changes within the chest wall musculature   and subcutaneous tissues.    Abdomen/pelvis:  1.  Foci of hemorrhagic changes within the ventral soft tissues likely   related to subcutaneous injection.  2.  Otherwise, no acute findings.  < end of copied text >     ---___---___---___---___---___---___ ---___---___---___---___---                         A S S E S S M E N T   A N D   P L A N :    73 year old female AAOX3, from home, wheelchair-bound and uses cane occasionally , w/ PMHx COPD on 3 L NC at home, Obesity, Hypothyroidism, Diastolic HF on Lasix, Breast cancer s/p surgery, Asthma, Chronic lymphedema, Kidney cancer s/p partial nephrectomy discharged from [facility] on 24 for chf exacerbation and new Afib with RVR discharged on Eliquis presenting to ED after a fall at home earlier today admitted for hypotension likely septic vs hypovolemic shock.  patient admitted to the ICU for further stabilization  patient overall improved       Assessment:   Shock ( septic vs hypovolemic from acute blood loss)   Lactic acidosis   ALDAIR on CKD III  Hematuria, possible UTI  Acute on chronic anemia   Multiple hematomas   Recurrent falls  hematomas  acute rib fractures  Heart failure with preserved EF   COPD   afib   anxiety    =======>>>    ICU care and management appreciated   post 1 unit PRBC     Hgb stable post transfusion  S/p IV fluid resuscitation     Hold diuretics and antihypertensives     pressors as needed and wean as able      Hemodynamic monitoring     cardio follow up      Hold anticoagulation ( will need to re-eval long term AC given above)    F/u cultures    on Broad spectrum antibiotics    ID on board    Cont. home COPD medications    Podiatry and wound care consult    --------------------------------------------  Case discussed with PMD ( outpatient carido) as well and updated   Education given on findings and plan of care.  ___________________________  Will follow with you.  Thank you,  DIANE Welch D.O.  Pager: [phone]

## 2024-02-22 DIAGNOSIS — J44.9 CHRONIC OBSTRUCTIVE PULMONARY DISEASE, UNSPECIFIED: ICD-10-CM

## 2024-02-22 DIAGNOSIS — I50.32 CHRONIC DIASTOLIC (CONGESTIVE) HEART FAILURE: ICD-10-CM

## 2024-02-22 DIAGNOSIS — I87.2 VENOUS INSUFFICIENCY (CHRONIC) (PERIPHERAL): ICD-10-CM

## 2024-02-22 DIAGNOSIS — E66.01 MORBID (SEVERE) OBESITY DUE TO EXCESS CALORIES: ICD-10-CM

## 2024-02-22 DIAGNOSIS — R29.6 REPEATED FALLS: ICD-10-CM

## 2024-02-22 DIAGNOSIS — I48.91 UNSPECIFIED ATRIAL FIBRILLATION: ICD-10-CM

## 2024-02-22 DIAGNOSIS — Z51.5 ENCOUNTER FOR PALLIATIVE CARE: ICD-10-CM

## 2024-02-22 DIAGNOSIS — N17.0 ACUTE KIDNEY FAILURE WITH TUBULAR NECROSIS: ICD-10-CM

## 2024-02-22 DIAGNOSIS — R53.81 OTHER MALAISE: ICD-10-CM

## 2024-02-22 DIAGNOSIS — J96.21 ACUTE AND CHRONIC RESPIRATORY FAILURE WITH HYPOXIA: ICD-10-CM

## 2024-02-22 DIAGNOSIS — A41.9 SEPSIS, UNSPECIFIED ORGANISM: ICD-10-CM

## 2024-02-22 LAB
ALBUMIN SERPL ELPH-MCNC: 1.7 G/DL — LOW (ref 3.5–5)
ALP SERPL-CCNC: 94 U/L — SIGNIFICANT CHANGE UP (ref 40–120)
ALT FLD-CCNC: 28 U/L DA — SIGNIFICANT CHANGE UP (ref 10–60)
ANION GAP SERPL CALC-SCNC: 3 MMOL/L — LOW (ref 5–17)
AST SERPL-CCNC: 22 U/L — SIGNIFICANT CHANGE UP (ref 10–40)
BASE EXCESS BLDV CALC-SCNC: 1.2 MMOL/L — SIGNIFICANT CHANGE UP
BASOPHILS # BLD AUTO: 0.06 K/UL — SIGNIFICANT CHANGE UP (ref 0–0.2)
BASOPHILS NFR BLD AUTO: 0.3 % — SIGNIFICANT CHANGE UP (ref 0–2)
BILIRUB SERPL-MCNC: 0.7 MG/DL — SIGNIFICANT CHANGE UP (ref 0.2–1.2)
BUN SERPL-MCNC: 22 MG/DL — HIGH (ref 7–18)
CALCIUM SERPL-MCNC: 8.2 MG/DL — LOW (ref 8.4–10.5)
CHLORIDE SERPL-SCNC: 106 MMOL/L — SIGNIFICANT CHANGE UP (ref 96–108)
CO2 SERPL-SCNC: 26 MMOL/L — SIGNIFICANT CHANGE UP (ref 22–31)
CREAT SERPL-MCNC: 0.96 MG/DL — SIGNIFICANT CHANGE UP (ref 0.5–1.3)
EGFR: 62 ML/MIN/1.73M2 — SIGNIFICANT CHANGE UP
EOSINOPHIL # BLD AUTO: 0.08 K/UL — SIGNIFICANT CHANGE UP (ref 0–0.5)
EOSINOPHIL NFR BLD AUTO: 0.4 % — SIGNIFICANT CHANGE UP (ref 0–6)
GLUCOSE SERPL-MCNC: 170 MG/DL — HIGH (ref 70–99)
HCO3 BLDV-SCNC: 27 MMOL/L — SIGNIFICANT CHANGE UP (ref 22–29)
HCT VFR BLD CALC: 21.7 % — LOW (ref 34.5–45)
HCT VFR BLD CALC: 22.6 % — LOW (ref 34.5–45)
HGB BLD-MCNC: 7 G/DL — CRITICAL LOW (ref 11.5–15.5)
HGB BLD-MCNC: 7.3 G/DL — LOW (ref 11.5–15.5)
IMM GRANULOCYTES NFR BLD AUTO: 1.9 % — HIGH (ref 0–0.9)
LYMPHOCYTES # BLD AUTO: 1.56 K/UL — SIGNIFICANT CHANGE UP (ref 1–3.3)
LYMPHOCYTES # BLD AUTO: 7 % — LOW (ref 13–44)
MAGNESIUM SERPL-MCNC: 1.6 MG/DL — SIGNIFICANT CHANGE UP (ref 1.6–2.6)
MCHC RBC-ENTMCNC: 30.3 PG — SIGNIFICANT CHANGE UP (ref 27–34)
MCHC RBC-ENTMCNC: 31 PG — SIGNIFICANT CHANGE UP (ref 27–34)
MCHC RBC-ENTMCNC: 32.3 GM/DL — SIGNIFICANT CHANGE UP (ref 32–36)
MCHC RBC-ENTMCNC: 32.3 GM/DL — SIGNIFICANT CHANGE UP (ref 32–36)
MCV RBC AUTO: 93.8 FL — SIGNIFICANT CHANGE UP (ref 80–100)
MCV RBC AUTO: 96 FL — SIGNIFICANT CHANGE UP (ref 80–100)
MONOCYTES # BLD AUTO: 1.2 K/UL — HIGH (ref 0–0.9)
MONOCYTES NFR BLD AUTO: 5.4 % — SIGNIFICANT CHANGE UP (ref 2–14)
NEUTROPHILS # BLD AUTO: 19.02 K/UL — HIGH (ref 1.8–7.4)
NEUTROPHILS NFR BLD AUTO: 85 % — HIGH (ref 43–77)
NRBC # BLD: 1 /100 WBCS — HIGH (ref 0–0)
NRBC # BLD: 2 /100 WBCS — HIGH (ref 0–0)
PCO2 BLDV: 44 MMHG — HIGH (ref 39–42)
PH BLDV: 7.39 — SIGNIFICANT CHANGE UP (ref 7.32–7.43)
PHOSPHATE SERPL-MCNC: 2.1 MG/DL — LOW (ref 2.5–4.5)
PLATELET # BLD AUTO: 208 K/UL — SIGNIFICANT CHANGE UP (ref 150–400)
PLATELET # BLD AUTO: 238 K/UL — SIGNIFICANT CHANGE UP (ref 150–400)
PO2 BLDV: 53 MMHG — SIGNIFICANT CHANGE UP
POTASSIUM SERPL-MCNC: 3.3 MMOL/L — LOW (ref 3.5–5.3)
POTASSIUM SERPL-SCNC: 3.3 MMOL/L — LOW (ref 3.5–5.3)
PROT SERPL-MCNC: 5.4 G/DL — LOW (ref 6–8.3)
RBC # BLD: 2.26 M/UL — LOW (ref 3.8–5.2)
RBC # BLD: 2.41 M/UL — LOW (ref 3.8–5.2)
RBC # FLD: 18.6 % — HIGH (ref 10.3–14.5)
RBC # FLD: 18.6 % — HIGH (ref 10.3–14.5)
SAO2 % BLDV: 82.4 % — SIGNIFICANT CHANGE UP
SODIUM SERPL-SCNC: 135 MMOL/L — SIGNIFICANT CHANGE UP (ref 135–145)
WBC # BLD: 19.17 K/UL — HIGH (ref 3.8–10.5)
WBC # BLD: 22.35 K/UL — HIGH (ref 3.8–10.5)
WBC # FLD AUTO: 19.17 K/UL — HIGH (ref 3.8–10.5)
WBC # FLD AUTO: 22.35 K/UL — HIGH (ref 3.8–10.5)

## 2024-02-22 PROCEDURE — 73590 X-RAY EXAM OF LOWER LEG: CPT | Mod: 26,RT

## 2024-02-22 PROCEDURE — 99497 ADVNCD CARE PLAN 30 MIN: CPT | Mod: 25

## 2024-02-22 PROCEDURE — 99223 1ST HOSP IP/OBS HIGH 75: CPT | Mod: GC,25

## 2024-02-22 RX ORDER — OLANZAPINE 15 MG/1
5 TABLET, FILM COATED ORAL ONCE
Refills: 0 | Status: COMPLETED | OUTPATIENT
Start: 2024-02-22 | End: 2024-02-22

## 2024-02-22 RX ORDER — SODIUM,POTASSIUM PHOSPHATES 278-250MG
1 POWDER IN PACKET (EA) ORAL ONCE
Refills: 0 | Status: COMPLETED | OUTPATIENT
Start: 2024-02-22 | End: 2024-02-22

## 2024-02-22 RX ORDER — MIDODRINE HYDROCHLORIDE 2.5 MG/1
10 TABLET ORAL EVERY 8 HOURS
Refills: 0 | Status: DISCONTINUED | OUTPATIENT
Start: 2024-02-22 | End: 2024-02-22

## 2024-02-22 RX ORDER — NYSTATIN CREAM 100000 [USP'U]/G
1 CREAM TOPICAL
Refills: 0 | Status: DISCONTINUED | OUTPATIENT
Start: 2024-02-22 | End: 2024-02-28

## 2024-02-22 RX ADMIN — Medication 650 MILLIGRAM(S): at 22:29

## 2024-02-22 RX ADMIN — NYSTATIN CREAM 1 APPLICATION(S): 100000 CREAM TOPICAL at 17:04

## 2024-02-22 RX ADMIN — Medication 650 MILLIGRAM(S): at 19:23

## 2024-02-22 RX ADMIN — Medication 1.95 MICROGRAM(S)/KG/MIN: at 08:57

## 2024-02-22 RX ADMIN — Medication 650 MILLIGRAM(S): at 21:29

## 2024-02-22 RX ADMIN — MEROPENEM 100 MILLIGRAM(S): 1 INJECTION INTRAVENOUS at 13:46

## 2024-02-22 RX ADMIN — Medication 260 MILLIGRAM(S): at 18:24

## 2024-02-22 RX ADMIN — OLANZAPINE 5 MILLIGRAM(S): 15 TABLET, FILM COATED ORAL at 02:02

## 2024-02-22 RX ADMIN — Medication 650 MILLIGRAM(S): at 13:43

## 2024-02-22 RX ADMIN — MEROPENEM 100 MILLIGRAM(S): 1 INJECTION INTRAVENOUS at 06:33

## 2024-02-22 RX ADMIN — Medication 650 MILLIGRAM(S): at 14:13

## 2024-02-22 RX ADMIN — Medication 1 PACKET(S): at 13:43

## 2024-02-22 RX ADMIN — PANTOPRAZOLE SODIUM 40 MILLIGRAM(S): 20 TABLET, DELAYED RELEASE ORAL at 06:33

## 2024-02-22 NOTE — CONSULT NOTE ADULT - PROBLEM SELECTOR RECOMMENDATION 3
likely d/t acute infection c/b hypoperfusion i/s/o septic and hypovolemic shock  baseline SCr 0.54, with continued improvement  rest of management as per primary team

## 2024-02-22 NOTE — PROGRESS NOTE ADULT - SUBJECTIVE AND OBJECTIVE BOX
ICU VISIT  73y Female    Meds:  meropenem  IVPB 1000 milliGRAM(s) IV Intermittent every 8 hours    Allergies    Grapes (Hives)  sulfa drugs (Unknown)  Peaches (Hives)  shellfish (Hives)    Intolerances        VITALS:  Vital Signs Last 24 Hrs  T(C): 37.9 (22 Feb 2024 16:45), Max: 38.1 (22 Feb 2024 04:45)  T(F): 100.2 (22 Feb 2024 16:45), Max: 100.6 (22 Feb 2024 04:45)  HR: 100 (22 Feb 2024 16:45) (74 - 105)  BP: 98/67 (22 Feb 2024 16:45) (54/19 - 184/162)  BP(mean): 78 (22 Feb 2024 16:45) (22 - 175)  RR: 15 (22 Feb 2024 16:45) (13 - 30)  SpO2: 100% (22 Feb 2024 16:45) (72% - 100%)    Parameters below as of 21 Feb 2024 19:30  Patient On (Oxygen Delivery Method): nasal cannula        LABS/DIAGNOSTIC TESTS:                          7.3    22.35 )-----------( 238      ( 22 Feb 2024 10:00 )             22.6         02-22    135  |  106  |  22<H>  ----------------------------<  170<H>  3.3<L>   |  26  |  0.96    Ca    8.2<L>      22 Feb 2024 10:00  Phos  2.1     02-22  Mg     1.6     02-22    TPro  5.4<L>  /  Alb  1.7<L>  /  TBili  0.7  /  DBili  x   /  AST  22  /  ALT  28  /  AlkPhos  94  02-22      LIVER FUNCTIONS - ( 22 Feb 2024 10:00 )  Alb: 1.7 g/dL / Pro: 5.4 g/dL / ALK PHOS: 94 U/L / ALT: 28 U/L DA / AST: 22 U/L / GGT: x             CULTURES: Clean Catch Clean Catch (Midstream)  02-20 @ 14:25   >100,000 CFU/ml Enterococcus faecalis  >100,000 CFU/ml Enterococcus faecium  --  --      .Blood Blood-Peripheral  02-20 @ 11:00   No growth at 24 hours  --  --      .Blood Blood-Peripheral  02-20 @ 10:55   No growth at 24 hours  --  --      .Blood Blood-Heart  02-13 @ 07:10   No growth at 5 days  --  --                RADIOLOGY:      ROS:  [  ] UNABLE TO ELICIT ICU VISIT  73y Female who is looking better and feeling better , she is much more cooperative and pleasant, she is off pressors, she has been having low grade fevers most of the day but her WBC count has been decreasing. She has no diarrhea , she has negative blood cultures but her urine cultures are growing out 2 strains of Enterococcus. She still has an elevated WBC count but is decreasing.     Meds:  meropenem  IVPB 1000 milliGRAM(s) IV Intermittent every 8 hours    Allergies    Grapes (Hives)  sulfa drugs (Unknown)  Peaches (Hives)  shellfish (Hives)    Intolerances        VITALS:  Vital Signs Last 24 Hrs  T(C): 37.9 (22 Feb 2024 16:45), Max: 38.1 (22 Feb 2024 04:45)  T(F): 100.2 (22 Feb 2024 16:45), Max: 100.6 (22 Feb 2024 04:45)  HR: 100 (22 Feb 2024 16:45) (74 - 105)  BP: 98/67 (22 Feb 2024 16:45) (54/19 - 184/162)  BP(mean): 78 (22 Feb 2024 16:45) (22 - 175)  RR: 15 (22 Feb 2024 16:45) (13 - 30)  SpO2: 100% (22 Feb 2024 16:45) (72% - 100%)    Parameters below as of 21 Feb 2024 19:30  Patient On (Oxygen Delivery Method): nasal cannula        LABS/DIAGNOSTIC TESTS:                          7.3    22.35 )-----------( 238      ( 22 Feb 2024 10:00 )             22.6         02-22    135  |  106  |  22<H>  ----------------------------<  170<H>  3.3<L>   |  26  |  0.96    Ca    8.2<L>      22 Feb 2024 10:00  Phos  2.1     02-22  Mg     1.6     02-22    TPro  5.4<L>  /  Alb  1.7<L>  /  TBili  0.7  /  DBili  x   /  AST  22  /  ALT  28  /  AlkPhos  94  02-22      LIVER FUNCTIONS - ( 22 Feb 2024 10:00 )  Alb: 1.7 g/dL / Pro: 5.4 g/dL / ALK PHOS: 94 U/L / ALT: 28 U/L DA / AST: 22 U/L / GGT: x             CULTURES: Clean Catch Clean Catch (Midstream)  02-20 @ 14:25   >100,000 CFU/ml Enterococcus faecalis  >100,000 CFU/ml Enterococcus faecium  --  --      .Blood Blood-Peripheral  02-20 @ 11:00   No growth at 24 hours  --  --      .Blood Blood-Peripheral  02-20 @ 10:55   No growth at 24 hours  --  --      .Blood Blood-Heart  02-13 @ 07:10   No growth at 5 days  --  --                RADIOLOGY:      ROS:  [  ] UNABLE TO ELICIT

## 2024-02-22 NOTE — CONSULT NOTE ADULT - PROBLEM SELECTOR RECOMMENDATION 8
not in exacerbation   COPD, class E per GOLD criteria 2023 on 2-3L NC at baseline  most recent COPD exacerbation requiring hospitalization 2/2 (+) hMPV last month  rest of management as per primary team

## 2024-02-22 NOTE — CONSULT NOTE ADULT - PROBLEM SELECTOR RECOMMENDATION 11
h/o multitude of co-morbidities with significant disease burden and recurrent hospitalizations  Decrease in functional status now with limited mobility  PPS 30%, hospice appropriate but not ready to transition care    GOC: DNR/ DNI with limited medical management with clear explanations provided for indicated procedures   GOC discussion as above     Patient is at high risk for readmission and mortality, joanne benefit from home visiting program.  Palliative SW following

## 2024-02-22 NOTE — PROGRESS NOTE ADULT - ASSESSMENT
Septic Shock - resolved  UTI  Leukocytosis  Fevers - low grade      Plan - Cont Meropenem 1gm iv q8hrs  await sensitivities  Time spent - 31 mins.

## 2024-02-22 NOTE — CONSULT NOTE ADULT - ASSESSMENT
A:  Right lower leg skin tear, type B    P:  Pt evaluated and chart reviewed  Vitals reviewed, + leukocytosis, unlikely RLE as source of leukocytosis  Ordered right tib/fib xrays  Applied santyl, Allevyn pad to right lower leg.  Patient to be weightbearing as tolerated b/l   Discussed importance of daily foot examinations and proper shoe gear and to importance of lower Fasting Blood Glucose levels. Strict glycemic control for optimal wound healing.  Podiatry to follow in house.   Discussed with Dr. Omalley    A:  Right lower leg skin tear, type 2    P:  Pt evaluated and chart reviewed  Vitals reviewed, + leukocytosis, unlikely RLE as source of leukocytosis  Ordered right tib/fib xrays  Applied santyl, Allevyn pad to right lower leg.  Patient to be weightbearing as tolerated b/l   Discussed importance of daily foot examinations and proper shoe gear and to importance of lower Fasting Blood Glucose levels. Strict glycemic control for optimal wound healing.  Podiatry to follow in house.   Discussed with Dr. Omalley

## 2024-02-22 NOTE — PHYSICAL THERAPY INITIAL EVALUATION ADULT - DIAGNOSIS, PT EVAL
generalized weakness; impaired sitting balance; difficulty performing bed mobility; inability to perform supine-sit at edge of bed; inability to perform stand-pivot transfers, and ambulation

## 2024-02-22 NOTE — CHART NOTE - NSCHARTNOTEFT_GEN_A_CORE
Informed by RN that pt refusing BP cuff and pulling it off. Pt is on levophed for BP support. Pt continues to be confused and getting intermittently agitated. She does not remember me even though I have spoken with her multiple times tonight, does not know why she is in the ICU, says she "doesn't think she's sick at all" and maintains she is going home in the morning. I explained again that she is ill and in an ICU. I explained to pt at length why BP cuff was needed for her treatment, ansley when on pressors. Tried a larger cuff instead of the smaller cuff because she said it was uncomfortable. She refused the larger cuff as well after trying it. She wanted something to help her sleep, she agreed to zyprexa, says she is familiar with it. Zyprexa 5 mg IM ordered as earlier 2.5 mg dose was not effective. Will attempt to place the BP cuff again, ansley as pt requiring pressor support

## 2024-02-22 NOTE — CONSULT NOTE ADULT - PROBLEM SELECTOR RECOMMENDATION 5
new-onset afib dx 2/13-2/18 admission started on Eliquis which has been non-complaint reporting unaware she was supposed to take medication after discharge  holding AC for acute anemia requiring blood transfusions  HAS-BLED: 3-points, high risk of major bleeding (takes ASA, age>65, and HTN)  recurrent falls presenting after a mechanical fall with injury     Given worsening functional status, recurrent falls, refractory anemia with c/f occult bleed, would recommend risk-benefits of continued AC prior to resuming.

## 2024-02-22 NOTE — DIETITIAN INITIAL EVALUATION ADULT - WEIGHT FOR BMI (KG)
1) You should eat soft foods, and No chew until 4/17/2021 in order to prevent another tongue injury.     
104

## 2024-02-22 NOTE — CONSULT NOTE ADULT - PROBLEM SELECTOR RECOMMENDATION 10
Wheelchair bound but reports recent decline now unable to transfer from bed to chair, mostly bedbound now   limited HHA 9-12h x 7d/wk  AO x3 at baseline with mild cognitive impairment c/b episodes of delirium   9 recurrent hospital admissions <1y with 4 hospitalizations over the past 2-months  Deconditioning with prior PT recs that patient deferred  PT recs 2-3x/week for 8-10wks  Supportive Care     STAR patient for COPD and CHF with recurrent admissions, would benefit from Home Visiting Program   Palliative SW following

## 2024-02-22 NOTE — DIETITIAN INITIAL EVALUATION ADULT - OTHER INFO
Admit w/ hypotension likely septic vs hypovolemic shock. Pt wheelchair-bound and uses cane occasionally , w/ PMHx COPD on 3 L NC at home, Obesity, Hypothyroidism, Diastolic HF on Lasix, Breast cancer s/p surgery, Asthma, Chronic lymphedema, Kidney cancer s/p partial nephrectomy. Discharged 2/16/24 from [facility]. Pt seen, she reports she is 5 feet (she did not report food allergies), but honoring those in EMR. Pt interested in her meals and gave RD numerous requests which were relayed to kitchen. Pt does not like healthy desserts preferring sweets. Pt noted as wheelchair bound. Pt seen by RD Misa Giles 2/1/24 w/ ht 5'2 and wt 231.4 # (105 kg) and RD (TOMI Yanez) 2/16 wt 106.8 kg (235.4#)

## 2024-02-22 NOTE — PHYSICAL THERAPY INITIAL EVALUATION ADULT - ADDITIONAL COMMENTS
PTA pt states she was transferring with 1 assist from bed <-> wheelchair, uses wheelchair for mobility. HHA assists with all mobility & ADLs. Pt has HHA from 7:30/8am till 5:30/6:30 pm for 7 days/week, HHA assists her in the morning to get OOB to w/c, then assists her back to bed in evening. Pt takes sponge baths, owns wheelchair, rolling walker, mechanical lift device (does not use it).

## 2024-02-22 NOTE — CONSULT NOTE ADULT - PROBLEM SELECTOR RECOMMENDATION 9
BMI >40 with chronic comorbidities  decline in mobility status requiring a hospital bed and Colin lift for home care  PT recs 2-3x/week for 8-10wks  optimize thyroid and diuretics per primary team    Discussed benefits of weight loss on chronic conditions.   Nutrition following likely d/t UTI (+) enterococcus faecalis and faecium vs RLE cellulitis  c/w empiric abx as per primary team   c/w pressor support as indicated  patient amenable to central line discussion    If invasive interventions and treatment is needed patient agreeable to comply, as long as it is explained to her the risks and benefits and how it will be done.

## 2024-02-22 NOTE — PROGRESS NOTE ADULT - SUBJECTIVE AND OBJECTIVE BOX
INTERVAL HPI/OVERNIGHT EVENTS:       PRESSORS: [ ] YES [ ] NO  WHICH:    ANTIBIOTICS:                  DATE STARTED:  ANTIBIOTICS:                  DATE STARTED:    Antimicrobial:  meropenem  IVPB 1000 milliGRAM(s) IV Intermittent every 8 hours    Cardiovascular:  norepinephrine Infusion 0.01 MICROgram(s)/kG/Min IV Continuous <Continuous>    Pulmonary:    Hematalogic:    Other:  acetaminophen     Tablet .. 650 milliGRAM(s) Oral every 6 hours PRN  acetaminophen   IVPB .. 650 milliGRAM(s) IV Intermittent once PRN  levothyroxine 125 MICROGram(s) Oral daily  pantoprazole  Injectable 40 milliGRAM(s) IV Push two times a day    acetaminophen     Tablet .. 650 milliGRAM(s) Oral every 6 hours PRN  acetaminophen   IVPB .. 650 milliGRAM(s) IV Intermittent once PRN  levothyroxine 125 MICROGram(s) Oral daily  meropenem  IVPB 1000 milliGRAM(s) IV Intermittent every 8 hours  norepinephrine Infusion 0.01 MICROgram(s)/kG/Min IV Continuous <Continuous>  pantoprazole  Injectable 40 milliGRAM(s) IV Push two times a day    Drug Dosing Weight  Height (cm): 157.5 (20 Feb 2024 10:36)  Weight (kg): 104 (20 Feb 2024 10:36)  BMI (kg/m2): 41.9 (20 Feb 2024 10:36)  BSA (m2): 2.03 (20 Feb 2024 10:36)    PHYSICAL EXAM:  GENERAL: NAD  EYES: EOMI, PERRLA  NECK: Supple, No JVD; Normal thyroid; Trachea midline: No LAD   NERVOUS SYSTEM:  Alert & Oriented X3,  Motor Strength 5/5 B/L upper and lower extremities; DTRs 2+ intact and symmetric  CHEST/LUNG: No rales, rhonchi, wheezing, breath sounds present bilaterally  HEART: Regular rate and rhythm; No murmurs, no gallops  ABDOMEN: Soft, Nontender, Nondistended; Bowel sounds present, no pain or masses on palpation  : voiding well, Pinzon in place  EXTREMITIES:  2+ Peripheral Pulses, No clubbing, cyanosis, or edema  SKIN: warm, intact, no lesions     LINES/DRAINS/DEVICES  CENTRAL LINE: [ ] YES [ ] NO  LOCATION:     PINZON: [ ] YES [ ] NO     A-LINE:  [ ] YES [ ] NO  LOCATION:       ICU Vital Signs Last 24 Hrs  T(C): 37.5 (22 Feb 2024 07:00), Max: 38.1 (22 Feb 2024 04:45)  T(F): 99.5 (22 Feb 2024 07:00), Max: 100.6 (22 Feb 2024 04:45)  HR: 77 (22 Feb 2024 07:00) (73 - 117)  BP: 157/54 (22 Feb 2024 06:30) (54/19 - 206/176)  BP(mean): 86 (22 Feb 2024 06:30) (32 - 187)  ABP: --  ABP(mean): --  RR: 16 (22 Feb 2024 07:00) (13 - 34)  SpO2: 100% (22 Feb 2024 07:00) (71% - 100%)    O2 Parameters below as of 21 Feb 2024 19:30  Patient On (Oxygen Delivery Method): nasal cannula                  02-21 @ 07:01  -  02-22 @ 07:00  --------------------------------------------------------  IN: 1715.9 mL / OUT: 1090 mL / NET: 625.9 mL              LABS:  CBC Full  -  ( 21 Feb 2024 04:28 )  WBC Count : 34.98 K/uL  RBC Count : 2.89 M/uL  Hemoglobin : 8.7 g/dL  Hematocrit : 28.4 %  Platelet Count - Automated : 215 K/uL  Mean Cell Volume : 98.3 fl  Mean Cell Hemoglobin : 30.1 pg  Mean Cell Hemoglobin Concentration : 30.6 gm/dL  Auto Neutrophil # : x  Auto Lymphocyte # : x  Auto Monocyte # : x  Auto Eosinophil # : x  Auto Basophil # : x  Auto Neutrophil % : x  Auto Lymphocyte % : x  Auto Monocyte % : x  Auto Eosinophil % : x  Auto Basophil % : x    02-21    136  |  109<H>  |  33<H>  ----------------------------<  119<H>  4.3   |  17<L>  |  1.56<H>    Ca    7.1<L>      21 Feb 2024 04:28  Phos  3.9     02-21  Mg     1.2     02-21    TPro  5.4<L>  /  Alb  1.8<L>  /  TBili  0.9  /  DBili  x   /  AST  61<H>  /  ALT  35  /  AlkPhos  88  02-21    PT/INR - ( 21 Feb 2024 06:57 )   PT: 12.0 sec;   INR: 1.05 ratio         PTT - ( 21 Feb 2024 06:57 )  PTT:25.7 sec  Urinalysis Basic - ( 21 Feb 2024 04:28 )    Color: x / Appearance: x / SG: x / pH: x  Gluc: 119 mg/dL / Ketone: x  / Bili: x / Urobili: x   Blood: x / Protein: x / Nitrite: x   Leuk Esterase: x / RBC: x / WBC x   Sq Epi: x / Non Sq Epi: x / Bacteria: x      Culture Results:   >100,000 CFU/ml Gram positive organisms (02-20 @ 14:25)  Culture Results:   No growth at 24 hours (02-20 @ 11:00)  Culture Results:   No growth at 24 hours (02-20 @ 10:55)      RADIOLOGY & ADDITIONAL STUDIES REVIEWED DURING TEAM ROUNDS    [ ]GOALS OF CARE DISCUSSION WITH PATIENT/FAMILY/PROXY:    CRITICAL CARE TIME SPENT: 35 minutes   INTERVAL HPI/OVERNIGHT EVENTS: Patient refused morning las , central line / peripheral IVs, BP cuff   Unable to obtain accurate BP measures overnight.  Patient was given zyprexa for agitation       PRESSORS: [ ] YES [ ] NO  WHICH:    ANTIBIOTICS:                  DATE STARTED:  ANTIBIOTICS:                  DATE STARTED:    Antimicrobial:  meropenem  IVPB 1000 milliGRAM(s) IV Intermittent every 8 hours    Cardiovascular:  norepinephrine Infusion 0.01 MICROgram(s)/kG/Min IV Continuous <Continuous>    Pulmonary:    Hematalogic:    Other:  acetaminophen     Tablet .. 650 milliGRAM(s) Oral every 6 hours PRN  acetaminophen   IVPB .. 650 milliGRAM(s) IV Intermittent once PRN  levothyroxine 125 MICROGram(s) Oral daily  pantoprazole  Injectable 40 milliGRAM(s) IV Push two times a day    acetaminophen     Tablet .. 650 milliGRAM(s) Oral every 6 hours PRN  acetaminophen   IVPB .. 650 milliGRAM(s) IV Intermittent once PRN  levothyroxine 125 MICROGram(s) Oral daily  meropenem  IVPB 1000 milliGRAM(s) IV Intermittent every 8 hours  norepinephrine Infusion 0.01 MICROgram(s)/kG/Min IV Continuous <Continuous>  pantoprazole  Injectable 40 milliGRAM(s) IV Push two times a day    Drug Dosing Weight  Height (cm): 157.5 (20 Feb 2024 10:36)  Weight (kg): 104 (20 Feb 2024 10:36)  BMI (kg/m2): 41.9 (20 Feb 2024 10:36)  BSA (m2): 2.03 (20 Feb 2024 10:36)    PHYSICAL EXAM:  GENERAL: NAD, obese habitus, chronic UE tremors  EYES: EOMI, PERRLA  NECK: Supple, No JVD; Normal thyroid; Trachea midline: No LAD   NERVOUS SYSTEM:  Alert & Oriented X2-3,forgetful, follows commands    CHEST/LUNG: No rales, rhonchi, wheezing, breath sounds present bilaterally, saturating well on 3 L NC  HEART: Regular rate and rhythm; No murmurs, no gallops  ABDOMEN: Soft, Nontender, Nondistended; Bowel sounds present, no pain or masses on palpation  : voiding well, Pinzon in place  EXTREMITIES:  2+ Peripheral Pulses, No clubbing, cyanosis, + non pitting BL pedal lymphedema, + anterior R leg wound - 4x4 cm with surrounding erythema , local warmth - NON tender    SKIN: warm, intact, no lesions     LINES/DRAINS/DEVICES  CENTRAL LINE: [ ] YES [ ] NO  LOCATION:     PINZON: [ ] YES [ ] NO     A-LINE:  [ ] YES [ ] NO  LOCATION:       ICU Vital Signs Last 24 Hrs  T(C): 37.5 (22 Feb 2024 07:00), Max: 38.1 (22 Feb 2024 04:45)  T(F): 99.5 (22 Feb 2024 07:00), Max: 100.6 (22 Feb 2024 04:45)  HR: 77 (22 Feb 2024 07:00) (73 - 117)  BP: 157/54 (22 Feb 2024 06:30) (54/19 - 206/176)  BP(mean): 86 (22 Feb 2024 06:30) (32 - 187)  ABP: --  ABP(mean): --  RR: 16 (22 Feb 2024 07:00) (13 - 34)  SpO2: 100% (22 Feb 2024 07:00) (71% - 100%)    O2 Parameters below as of 21 Feb 2024 19:30  Patient On (Oxygen Delivery Method): nasal cannula                  02-21 @ 07:01  -  02-22 @ 07:00  --------------------------------------------------------  IN: 1715.9 mL / OUT: 1090 mL / NET: 625.9 mL              LABS:  CBC Full  -  ( 21 Feb 2024 04:28 )  WBC Count : 34.98 K/uL  RBC Count : 2.89 M/uL  Hemoglobin : 8.7 g/dL  Hematocrit : 28.4 %  Platelet Count - Automated : 215 K/uL  Mean Cell Volume : 98.3 fl  Mean Cell Hemoglobin : 30.1 pg  Mean Cell Hemoglobin Concentration : 30.6 gm/dL  Auto Neutrophil # : x  Auto Lymphocyte # : x  Auto Monocyte # : x  Auto Eosinophil # : x  Auto Basophil # : x  Auto Neutrophil % : x  Auto Lymphocyte % : x  Auto Monocyte % : x  Auto Eosinophil % : x  Auto Basophil % : x    02-21    136  |  109<H>  |  33<H>  ----------------------------<  119<H>  4.3   |  17<L>  |  1.56<H>    Ca    7.1<L>      21 Feb 2024 04:28  Phos  3.9     02-21  Mg     1.2     02-21    TPro  5.4<L>  /  Alb  1.8<L>  /  TBili  0.9  /  DBili  x   /  AST  61<H>  /  ALT  35  /  AlkPhos  88  02-21    PT/INR - ( 21 Feb 2024 06:57 )   PT: 12.0 sec;   INR: 1.05 ratio         PTT - ( 21 Feb 2024 06:57 )  PTT:25.7 sec  Urinalysis Basic - ( 21 Feb 2024 04:28 )    Color: x / Appearance: x / SG: x / pH: x  Gluc: 119 mg/dL / Ketone: x  / Bili: x / Urobili: x   Blood: x / Protein: x / Nitrite: x   Leuk Esterase: x / RBC: x / WBC x   Sq Epi: x / Non Sq Epi: x / Bacteria: x      Culture Results:   >100,000 CFU/ml Gram positive organisms (02-20 @ 14:25)  Culture Results:   No growth at 24 hours (02-20 @ 11:00)  Culture Results:   No growth at 24 hours (02-20 @ 10:55)      RADIOLOGY & ADDITIONAL STUDIES REVIEWED DURING TEAM ROUNDS    [ ]GOALS OF CARE DISCUSSION WITH PATIENT/FAMILY/PROXY:    CRITICAL CARE TIME SPENT: 35 minutes

## 2024-02-22 NOTE — PHYSICAL THERAPY INITIAL EVALUATION ADULT - PERTINENT HX OF CURRENT PROBLEM, REHAB EVAL
admitted due to hypotension  PMHx COPD on 3 L NC at home, Obesity, Hypothyroidism, Diastolic HF on Lasix, Breast cancer s/p surgery, Asthma, Chronic lymphedema, Kidney cancer s/p partial nephrectomy; CHF exacerbation; new A-Fib with RVR

## 2024-02-22 NOTE — CONSULT NOTE ADULT - PROBLEM SELECTOR RECOMMENDATION 2
multifactorial COPD 2-3L at baseline, diastolic CHF, morbid obesity, deconditioning exacerbated by septic shock   not in respiratory distress  c/w supp O2 now at baseline  rest of management as per primary team

## 2024-02-22 NOTE — CONSULT NOTE ADULT - PROBLEM SELECTOR RECOMMENDATION 4
presented after a traumatic mechanical fall   (+) nasal open contusion and multiple contusions and hematomas throughout the body  no CTH on file  CT c/a/p: multiple acute and chronic rib fractures at varying healing stages   wheelchair bound with rolling walker assist from bed to chair at baseline  deconditioned i/s/o recurrent admissions  PT recs JUDITH, of note patient noted to defer JUDITH multiple times opting for home PT with home care

## 2024-02-22 NOTE — CONSULT NOTE ADULT - SUBJECTIVE AND OBJECTIVE BOX
Consult to: Discuss complex medical decision making related to goals of care    [facility] Geriatric and Palliative Consult Service:  Alicia Haile DO: cell ([phone])  Hemant Hagan MD: cell ([phone])  Syed Leon NP: cell ([phone])   Rodney Garcia SW: cell ([phone])   Luisana Phelan NP: via [employer] Teams    Can contact any Palliative Team member via [employer] Teams for consults and questions    HPI:  HPI: 73 year old female AAOX3, from home, wheelchair-bound and uses cane occasionally , w/ PMHx COPD on 3 L NC at home, Obesity, Hypothyroidism, Diastolic HF on Lasix, Breast cancer s/p surgery, Asthma, Chronic lymphedema, Kidney cancer s/p partial nephrectomy discharged from [facility] on 24 for chf exacerbation and new Afib with RVR discharged on Eliquis presenting to ED after a fall at home earlier today. Patient states that she was using her cane to ambulate before tripping and falling landing on her nose, resulting in a wound on her nose that started to bleed prompting her to ED. Patient denies losing consciousness or any preceding symptoms such as headaches, dizziness, and chest pain. Patient reports that she did not take her eliquis since leaving the hospital because she noticed many bruises around her body. She denies any fevers, chills, headaches, dizziness, chest pain, cough, SOB, abd pain, n/v, diarrhea, constipation, hematuria, dysuria, numbness, and weakness.   In ED VS: HR 74, BP 88/56, RR 22, SPo2 87% 4LNC, WBC 34k, Hb 7.9, Cr 2.64, Trop 137, Lactate 5.4, BNP 10k, UA +, Acute 11-12 Rib fractures Left  s/p 2g cefepime, 1 g vanc, 2L NS bolus, started on levophed       INTERVAL HOSPITAL COURSE:           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      History of carpal tunnel release  right wrist      History of parathyroidectomy      S/P laminectomy  now bed and wheelchair ridden with severe pain          SOCIAL HISTORY:    Admitted from:  home  (with HHA), refused JUDITH after multiple evaluations and recommendations   [ none ] Substance abuse, [ none ] Tobacco hx, [ none ] Alcohol hx, [ none ] Home Opioid Hx  Zoroastrian:  Language preferred: English    FAMILY HISTORY:  Family history of myocardial infarction  mother  at age 67 and father at age 67 of MI's    Family history of hypertension  mother and father    Family history of diabetes mellitus (Sibling)  siblings    Family history of heart disease (Sibling)  brother has a PPM    Family history of thyroid cancer (Child)  daughter has thyroid cancer     unable to obtain from patient due to poor mentation, family unable to give information, see H&P for history  Baseline ADLs (prior to admission):    Allergies:  Grapes (Hives)  sulfa drugs (Unknown)  Peaches (Hives)  shellfish (Hives)        REVIEW OF SYSTEMS: [All others negative]  Present Symptoms: Moderate  Pain:             Location - Head and neck 2/2 fall                               Aggravating factors - movement              Quality - aching              Radiation - none             Timing- persistent              Severity (0-10 scale):              Minimal acceptable level (0-10 scale):  Fatigue: none  Nausea: none  Lack of Appetite: denies   SOB: denies   Depression: none  Anxiety: none   Constipation:     CPOT:  0  https://ccs-st.org/resources/Documents/Sedation%20Analgesia%20Delirium%20in%20CC/CCS%20STH%20Guideline%20template%20CPOT.pdf  PAIN AD Score: 0  [email or web address] (press ctrl +  left click to view)      MEDICATIONS  (STANDING):  levothyroxine 125 MICROGram(s) Oral daily  meropenem  IVPB 1000 milliGRAM(s) IV Intermittent every 8 hours  nystatin Powder 1 Application(s) Topical two times a day  pantoprazole  Injectable 40 milliGRAM(s) IV Push two times a day  potassium phosphate / sodium phosphate Powder (PHOS-NaK) 1 Packet(s) Oral once    MEDICATIONS  (PRN):  acetaminophen     Tablet .. 650 milliGRAM(s) Oral every 6 hours PRN Temp greater or equal to 38C (100.4F), Mild Pain (1 - 3)  acetaminophen   IVPB .. 650 milliGRAM(s) IV Intermittent once PRN Temp greater or equal to 38C (100.4F), Mild Pain (1 - 3)      PHYSICAL EXAM:  Vital Signs Last 24 Hrs  T(C): 37.8 (2024 12:00), Max: 38.1 (2024 04:45)  T(F): 100 (2024 12:00), Max: 100.6 (2024 04:45)  HR: 100 (2024 12:00) (73 - 115)  BP: 124/98 (2024 12:00) (54/19 - 184/88)  BP(mean): 109 (2024 12:00) (22 - 175)  RR: 18 (2024 12:00) (13 - 34)  SpO2: 91% (2024 12:00) (71% - 100%)    Parameters below as of 2024 19:30  Patient On (Oxygen Delivery Method): nasal cannula        General: alert  oriented x 3, with poor insight and judgement; dishevelled     Palliative Performance Scale/Karnofsky Score: 30%  http://npcrc.org/files/news/palliative_performance_scale_[email or web address].pdf    HEENT: (+) nasal contusion with abrasion with bandage  Lungs: not in distress, poor inspiratory effort limited by body habitus  CV: RRR, S1S2, tachycardia  GI: soft non distended non tender  incontinent  : incontinent primafit with good output  Musculoskeletal: weakness x4 edema x4; mostly/bedbound and fully wheelchair bound  Skin: (+) multiple contusions diffuse at various healing stages   Neuro: no deficits, mild cognitive impairment; AOx3 but unable to recall events of refusing treatments  Oral intake ability: moderate capability        LABS:                        7.3    22.35 )-----------( 238      ( 2024 10:00 )             22.6     02-22    135  |  106  |  22<H>  ----------------------------<  170<H>  3.3<L>   |  26  |  0.96    Ca    8.2<L>      2024 10:00  Phos  2.1     -  Mg     1.6         TPro  5.4<L>  /  Alb  1.7<L>  /  TBili  0.7  /  DBili  x   /  AST  22  /  ALT  28  /  AlkPhos  94      Urinalysis Basic - ( 2024 10:00 )    Color: x / Appearance: x / SG: x / pH: x  Gluc: 170 mg/dL / Ketone: x  / Bili: x / Urobili: x   Blood: x / Protein: x / Nitrite: x   Leuk Esterase: x / RBC: x / WBC x   Sq Epi: x / Non Sq Epi: x / Bacteria: x                                                                                 RADIOLOGY & ADDITIONAL STUDIES:  __________________________________________________________________________    __________________________________________________________________________    __________________________________________________________________________    __________________________________________________________________________    __________________________________________________________________________   Consult to: Discuss complex medical decision making related to goals of care    [facility] Geriatric and Palliative Consult Service:  Alicia Lazara DO: cell ([phone])  Hemant Hagan MD: cell ([phone])  Syed Leon NP: cell ([phone])   Rodney Garcia SW: cell ([phone])   Luisana Phelan NP: via [employer] Teams    Can contact any Palliative Team member via [employer] Teams for consults and questions    HPI:  HPI: 73 year old female AAOX3, from home, wheelchair-bound and uses cane occasionally , w/ PMHx COPD on 3 L NC at home, Obesity, Hypothyroidism, Diastolic HF on Lasix, Breast cancer s/p surgery, Asthma, Chronic lymphedema, Kidney cancer s/p partial nephrectomy discharged from [facility] on 24 for chf exacerbation and new Afib with RVR discharged on Eliquis presenting to ED after a fall at home earlier today. Patient states that she was using her cane to ambulate before tripping and falling landing on her nose, resulting in a wound on her nose that started to bleed prompting her to ED. Patient denies losing consciousness or any preceding symptoms such as headaches, dizziness, and chest pain. Patient reports that she did not take her eliquis since leaving the hospital because she noticed many bruises around her body. She denies any fevers, chills, headaches, dizziness, chest pain, cough, SOB, abd pain, n/v, diarrhea, constipation, hematuria, dysuria, numbness, and weakness.   In ED VS: HR 74, BP 88/56, RR 22, SPo2 87% 4LNC, WBC 34k, Hb 7.9, Cr 2.64, Trop 137, Lactate 5.4, BNP 10k, UA +, Acute 11-12 Rib fractures Left  s/p 2g cefepime, 1 g vanc, 2L NS bolus, started on levophed       INTERVAL HOSPITAL COURSE:   Admitted to the ICU for Septic Shock requiring pressors started on empiric abx for cellulitis and UTI, UCx growing Enterococcus faecalis, faecium. Patient requiring peripheral pressors however is   Of note, this admission is patient's 9th consecutive admission with multiple PT evaluations with recommendations for JUDITH which patient has deferred         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      History of carpal tunnel release  right wrist      History of parathyroidectomy      S/P laminectomy  now bed and wheelchair ridden with severe pain          SOCIAL HISTORY:    Admitted from:  home  (with HHA), refused JUDITH after multiple evaluations and recommendations   [ none ] Substance abuse, [ none ] Tobacco hx, [ none ] Alcohol hx, [ none ] Home Opioid Hx  Restoration:  Language preferred: English    FAMILY HISTORY:  Family history of myocardial infarction  mother  at age 67 and father at age 67 of MI's    Family history of hypertension  mother and father    Family history of diabetes mellitus (Sibling)  siblings    Family history of heart disease (Sibling)  brother has a PPM    Family history of thyroid cancer (Child)  daughter has thyroid cancer     unable to obtain from patient due to poor mentation, family unable to give information, see H&P for history  Baseline ADLs (prior to admission):    Allergies:  Grapes (Hives)  sulfa drugs (Unknown)  Peaches (Hives)  shellfish (Hives)        REVIEW OF SYSTEMS: [All others negative]  Present Symptoms: Moderate  Pain:             Location - Head and neck 2/2 fall                               Aggravating factors - movement              Quality - aching              Radiation - none             Timing- persistent              Severity (0-10 scale):              Minimal acceptable level (0-10 scale):  Fatigue: none  Nausea: none  Lack of Appetite: denies   SOB: denies   Depression: none  Anxiety: none   Constipation:     CPOT:  0  https://ccs-sth.org/resources/Documents/Sedation%20Analgesia%20Delirium%20in%20CC/CCS%20STH%20Guideline%20template%20CPOT.pdf  PAIN AD Score: 0  [email or web address] (press ctrl +  left click to view)      MEDICATIONS  (STANDING):  levothyroxine 125 MICROGram(s) Oral daily  meropenem  IVPB 1000 milliGRAM(s) IV Intermittent every 8 hours  nystatin Powder 1 Application(s) Topical two times a day  pantoprazole  Injectable 40 milliGRAM(s) IV Push two times a day  potassium phosphate / sodium phosphate Powder (PHOS-NaK) 1 Packet(s) Oral once    MEDICATIONS  (PRN):  acetaminophen     Tablet .. 650 milliGRAM(s) Oral every 6 hours PRN Temp greater or equal to 38C (100.4F), Mild Pain (1 - 3)  acetaminophen   IVPB .. 650 milliGRAM(s) IV Intermittent once PRN Temp greater or equal to 38C (100.4F), Mild Pain (1 - 3)      PHYSICAL EXAM:  Vital Signs Last 24 Hrs  T(C): 37.8 (2024 12:00), Max: 38.1 (2024 04:45)  T(F): 100 (2024 12:00), Max: 100.6 (2024 04:45)  HR: 100 (2024 12:00) (73 - 115)  BP: 124/98 (2024 12:00) (54/19 - 184/88)  BP(mean): 109 (2024 12:00) (22 - 175)  RR: 18 (2024 12:00) (13 - 34)  SpO2: 91% (2024 12:00) (71% - 100%)    Parameters below as of 2024 19:30  Patient On (Oxygen Delivery Method): nasal cannula        General: alert  oriented x 3, with poor insight and judgement; dishevelled     Palliative Performance Scale/Karnofsky Score: 30%  http://npcrc.org/files/news/palliative_performance_scale_ppsv2.pdf    HEENT: (+) nasal contusion with abrasion with bandage  Lungs: not in distress, poor inspiratory effort limited by body habitus  CV: RRR, S1S2, tachycardia  GI: soft non distended non tender  incontinent  : incontinent primafit with good output  Musculoskeletal: weakness x4 edema x4; mostly/bedbound and fully wheelchair bound  Skin: (+) multiple contusions diffuse at various healing stages   Neuro: no deficits, mild cognitive impairment; AOx3 but unable to recall events of refusing treatments  Oral intake ability: moderate capability        LABS:                        7.3    22.35 )-----------( 238      ( 2024 10:00 )             22.6         135  |  106  |  22<H>  ----------------------------<  170<H>  3.3<L>   |  26  |  0.96    Ca    8.2<L>      2024 10:00  Phos  2.1       Mg     1.6         TPro  5.4<L>  /  Alb  1.7<L>  /  TBili  0.7  /  DBili  x   /  AST    /  ALT  28  /  AlkPhos  94      Urinalysis Basic - ( 2024 10:00 )    Color: x / Appearance: x / SG: x / pH: x  Gluc: 170 mg/dL / Ketone: x  / Bili: x / Urobili: x   Blood: x / Protein: x / Nitrite: x   Leuk Esterase: x / RBC: x / WBC x   Sq Epi: x / Non Sq Epi: x / Bacteria: x                                                                                 RADIOLOGY & ADDITIONAL STUDIES:  __________________________________________________________________________    __________________________________________________________________________    __________________________________________________________________________    __________________________________________________________________________    __________________________________________________________________________   Consult to: Discuss complex medical decision making related to goals of care    [facility] Geriatric and Palliative Consult Service:  Alicia Lazara DO: cell ([phone])  Hemant Hagan MD: cell ([phone])  Syde Leon NP: cell ([phone])   Rodney Garcia SW: cell ([phone])   Luisana Phelan NP: via [employer] Teams    Can contact any Palliative Team member via [employer] Teams for consults and questions    HPI:  HPI: 73 year old female AAOX3, from home, wheelchair-bound and uses cane occasionally , w/ PMHx COPD on 3 L NC at home, Obesity, Hypothyroidism, Diastolic HF on Lasix, Breast cancer s/p surgery, Asthma, Chronic lymphedema, Kidney cancer s/p partial nephrectomy discharged from [facility] on 24 for chf exacerbation and new Afib with RVR discharged on Eliquis presenting to ED after a fall at home earlier today. Patient states that she was using her cane to ambulate before tripping and falling landing on her nose, resulting in a wound on her nose that started to bleed prompting her to ED. Patient denies losing consciousness or any preceding symptoms such as headaches, dizziness, and chest pain. Patient reports that she did not take her eliquis since leaving the hospital because she noticed many bruises around her body. She denies any fevers, chills, headaches, dizziness, chest pain, cough, SOB, abd pain, n/v, diarrhea, constipation, hematuria, dysuria, numbness, and weakness.   In ED VS: HR 74, BP 88/56, RR 22, SPo2 87% 4LNC, WBC 34k, Hb 7.9, Cr 2.64, Trop 137, Lactate 5.4, BNP 10k, UA +, Acute 11-12 Rib fractures Left  s/p 2g cefepime, 1 g vanc, 2L NS bolus, started on levophed       INTERVAL HOSPITAL COURSE:   Admitted to the ICU for Septic Shock requiring pressors started on empiric abx for cellulitis and UTI, UCx growing Enterococcus faecalis, faecium. Course has been complicated by uncooperative behavior with current management refusing central lines, abx, IV lines, and lab work. Non-compliant behavior further compounded by delirium unclear if superimposed on mild cognitive impairment. GOC on admission document FULL CODE, however refusing offered treatment places patient at increased risk for further clinical deterioration    Of note, this admission is patient's 9th consecutive admission with multiple PT evaluations with recommendations for JUDITH which patient has deferred and opting for Home PT with hospital bed, peter lift, and assisted walking devices.       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      History of carpal tunnel release  right wrist      History of parathyroidectomy      S/P laminectomy  now bed and wheelchair ridden with severe pain          SOCIAL HISTORY:    Admitted from:  home  (with HHA), refused JUDITH after multiple evaluations and recommendations   [ none ] Substance abuse, [ none ] Tobacco hx, [ none ] Alcohol hx, [ none ] Home Opioid Hx  Scientologist:  Language preferred: English    FAMILY HISTORY:  Family history of myocardial infarction  mother  at age 67 and father at age 67 of MI's    Family history of hypertension  mother and father    Family history of diabetes mellitus (Sibling)  siblings    Family history of heart disease (Sibling)  brother has a PPM    Family history of thyroid cancer (Child)  daughter has thyroid cancer     unable to obtain from patient due to poor mentation, family unable to give information, see H&P for history  Baseline ADLs (prior to admission):    Allergies:  Grapes (Hives)  sulfa drugs (Unknown)  Peaches (Hives)  shellfish (Hives)        REVIEW OF SYSTEMS: [All others negative]  Present Symptoms: Moderate  Pain:             Location - Head and neck 2/2 fall                               Aggravating factors - movement              Quality - aching              Radiation - none             Timing- persistent              Severity (0-10 scale):              Minimal acceptable level (0-10 scale):  Fatigue: none  Nausea: none  Lack of Appetite: denies   SOB: denies   Depression: none  Anxiety: none   Constipation:     CPOT:  0  https://ccs-st.org/resources/Documents/Sedation%20Analgesia%20Delirium%20in%20CC/CCS%20STH%20Guideline%20template%20CPOT.pdf  PAIN AD Score: 0  [email or web address] (press ctrl +  left click to view)      MEDICATIONS  (STANDING):  levothyroxine 125 MICROGram(s) Oral daily  meropenem  IVPB 1000 milliGRAM(s) IV Intermittent every 8 hours  nystatin Powder 1 Application(s) Topical two times a day  pantoprazole  Injectable 40 milliGRAM(s) IV Push two times a day  potassium phosphate / sodium phosphate Powder (PHOS-NaK) 1 Packet(s) Oral once    MEDICATIONS  (PRN):  acetaminophen     Tablet .. 650 milliGRAM(s) Oral every 6 hours PRN Temp greater or equal to 38C (100.4F), Mild Pain (1 - 3)  acetaminophen   IVPB .. 650 milliGRAM(s) IV Intermittent once PRN Temp greater or equal to 38C (100.4F), Mild Pain (1 - 3)      PHYSICAL EXAM:  Vital Signs Last 24 Hrs  T(C): 37.8 (2024 12:00), Max: 38.1 (2024 04:45)  T(F): 100 (2024 12:00), Max: 100.6 (2024 04:45)  HR: 100 (2024 12:00) (73 - 115)  BP: 124/98 (2024 12:00) (54/19 - 184/88)  BP(mean): 109 (2024 12:00) (22 - 175)  RR: 18 (2024 12:00) (13 - 34)  SpO2: 91% (2024 12:00) (71% - 100%)    Parameters below as of 2024 19:30  Patient On (Oxygen Delivery Method): nasal cannula        General: alert  oriented x 3, with poor insight and judgement; dishevelled     Palliative Performance Scale/Karnofsky Score: 30%  http://npcrc.org/files/news/palliative_performance_scale_ppsv2.pdf    HEENT: (+) nasal contusion with abrasion with bandage  Lungs: not in distress, poor inspiratory effort limited by body habitus  CV: RRR, S1S2, tachycardia  GI: soft non distended non tender  incontinent  : incontinent primafit with good output  Musculoskeletal: weakness x4 edema x4; mostly/bedbound and fully wheelchair bound  Skin: (+) multiple contusions diffuse at various healing stages   Neuro: no deficits, mild cognitive impairment; AOx3 but unable to recall events of refusing treatments  Oral intake ability: moderate capability        LABS:                        7.3    22.35 )-----------( 238      ( 2024 10:00 )             22.6         135  |  106  |  22<H>  ----------------------------<  170<H>  3.3<L>   |  26  |  0.96    Ca    8.2<L>      2024 10:00  Phos  2.1       Mg     1.6         TPro  5.4<L>  /  Alb  1.7<L>  /  TBili  0.7  /  DBili  x   /  AST  22  /  ALT  28  /  AlkPhos  94      Urinalysis Basic - ( 2024 10:00 )    Color: x / Appearance: x / SG: x / pH: x  Gluc: 170 mg/dL / Ketone: x  / Bili: x / Urobili: x   Blood: x / Protein: x / Nitrite: x   Leuk Esterase: x / RBC: x / WBC x   Sq Epi: x / Non Sq Epi: x / Bacteria: x                                                                                 RADIOLOGY & ADDITIONAL STUDIES:  __________________________________________________________________________  < from: Xray Chest 1 View-PORTABLE IMMEDIATE (24 @ 11:52) >  FINDINGS:  Heart/Vascular: The mediastinum, hilum and aorta are within normal limits   for projection. Cardiomegaly.  Pulmonary: Midline trachea. There is no focal infiltrate, congestion or   effusion.  Bones: There is no fracture.  Lines and catheter: None    Impression:  No acute pulmonary disease.  __________________________________________________________________________  < from: CT Chest No Cont (24 @ 12:10) >  FINDINGS:  CHEST:  LUNGS AND LARGE AIRWAYS: Patent central airways. Emphysema. Improved   appearance of previously noted basilar posterior left upper lobe   tree-in-bud opacities and left lower lobe consolidation. Small residual   branching opacities in the left lower lobe corresponding to location of   previously noted consolidation. Lower lobe subsegmental atelectasis. Mild   peripheral fibrotic changes. With few tiny calcified and granulomas.  PLEURA:No pleural effusion.  VESSELS: Aortic and arch vessel calcification. Enlarged main pulmonary artery, suspect underlying pulmonary hypertension.  HEART: Cardiomegaly. No pericardial effusion. Aortic valvular calcification.  MEDIASTINUM AND MIYA: No lymphadenopathy. Small hiatal hernia.  CHEST WALL AND LOWER NECK: Hemorrhagic changes within bilateral pectoralis musculature and subjacent tissues.    ABDOMEN AND PELVIS:  LIVER: Within normal limits.  BILE DUCTS: Normal caliber.  GALLBLADDER: Cholecystectomy.  SPLEEN: Within normal limits.  PANCREAS: Partial fatty atrophy. Scattered calcifications, primarily in the head.  ADRENALS: Within normal limits.  KIDNEYS/URETERS: Bilateral cysts. Mild nonobstructing nephrolithiasis, punctate stone in each kidney.    BLADDER: Completely decompressed, coiled catheter in place.  REPRODUCTIVE ORGANS: Within normal limits. Coarse right adnexal calcification.    BOWEL: No bowel obstruction. Appendix is normal. Scant colonic diverticula.  PERITONEUM: No ascites.  VESSELS: Atherosclerotic changes.  RETROPERITONEUM/LYMPH NODES: No lymphadenopathy.  ABDOMINAL WALL: 7-8 cm fat-containing midline epigastric hernia and small   fat-containing umbilical hernia. Foci of hemorrhagic change within the   low ventral subcutaneous tissues and droplets of air, correlate for   history of subcutaneous injection. Associated skin thickening. Mild soft   tissue contusion in the left upper gluteal/flank region.  BONES: Acute fractures of the posterior left 11th and 12th ribs, 11th rib   fracture displaced/overlapping and 12th rib fracture nondisplaced.   Numerous bilateral chronic and subacute/chronic bilateral rib fractures.   Partially imaged cervicothoracic fusion hardware. For detailed   discussion, see CT of the cervical spine dated 2024. Degenerative   changes of the spine. Chronic right iliac bone defect presumably related   to prior bone graft procedure.    IMPRESSION:  Chest CT:  1.  Near-complete complete resolution of previously noted   pneumonia/airways disease.  2.  Acute posterior left 11th and 12th rib fractures  3.  Posttraumatic hemorrhagic changes within the chest wall musculature and subcutaneous tissues.    Abdomen/pelvis:  1.  Foci of hemorrhagic changes within the ventral soft tissues likely related to subcutaneous injection.  2.  Otherwise, no acute findings.    __________________________________________________________________________  < from: TTE W or WO Ultrasound Enhancing Agent (24 @ 08:52) >  CONCLUSIONS:      1. Technically difficult image quality.   2. Left ventricular endocardium is not well visualized; however, the left ventricular systolic function appears grossly normal.   3. There is normal LV mass and concentric remodeling.   4. There is mild (grade 1) left ventricular diastolic dysfunction.  5. 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 Given the very poor image quality of this study the nature of this gradient cannot be further charachterized.   6. The right ventricle is not well visualized.

## 2024-02-22 NOTE — CONSULT NOTE ADULT - CONSULT REASON
RLE wound
Septic Shock / UTI
medical / post ICu care and management
septic shock due to UTI with multiple co-morbidities and readmissions - progressive clinical and functional decline, for GOC

## 2024-02-22 NOTE — PHYSICAL THERAPY INITIAL EVALUATION ADULT - IMPAIRMENTS FOUND, PT EVAL
aerobic capacity/endurance/cognitive impairment/decreased midline orientation/gait, locomotion, and balance/muscle strength/poor safety awareness/posture

## 2024-02-22 NOTE — CONSULT NOTE ADULT - PROBLEM SELECTOR RECOMMENDATION 7
not in exacerbation   TTE limited views with no interval change.   rest of management as per primary team

## 2024-02-22 NOTE — CONSULT NOTE ADULT - CONVERSATION DETAILS
Spoke with the patient AOx3 with mild cognitive impairment and questionable insight. Patient is orientated to person, place, time and has basic understanding of why she is in the hospital but unclear at times on the current management. She is unable to recall her non-complaint behavior overnight.    Identified her , Freddy Hendrickson [phone] as the primary health care surrogate / health care proxy reporting that he is aware of what her wishes are and trusts him to act on her behalf.  She appointed her son, Sunny as the secondary agent.     We had an extensive conversation about her care and current condition with concerns for her 9-admissions this past year, with four of her recent admissions occurring over the last two-months. She reports medication compliance and ability to provide care on her own with the assistance of HHA but unable to rationalize or explain her recurrent and frequent admissions.  Discussed at great length how her chronic co-morbidities over time with multiple hospitalizations can place added burden on her body and overall care. Explained that non-compliance with medications and treatment adherence places her at increased risk for further clinical deterioration resulting in recurrent hospitalizations for terminal illnesses and eventually result in death. Addressed her underlying questions and concerns surrounding her non-compliance. Patient expressed understanding of the risks associated with her non-compliant behavior and endorsed willingness to comply with clear explanations.     Discussed overall goals of care including advanced directives with the patient. She requested that in the event she went into cardiac arrest she would prefer to allow natural death and defer artificial life support. Explained that given her frequent hospitalization recurrence and significant disease burden she would not likely benefit from aggressive resuscitation efforts such as CPR and intubation, patient in agreement.      After this conversation decision was made by the patient to change status to DNR/DNI and Allow Natural Death with limited medical management. Patient is amenable to central line discussion as she understands its use and how it will be placed.   MOLST form completed, signed, and placed in chart. Spoke with the patient AOx3 with mild cognitive impairment, had episodes of delirium due to septic shock. Currently patient is orientated to person, place, time and has understanding of why she is in the hospital but needed reminder of current management.     Identified her , Shannon Hendrickson [phone] as the primary health care surrogate / health care proxy reporting that he is aware of what her wishes are and trusts him to act on her behalf.  She appointed her son, Sunny as the secondary agent. She reports having a daughter who just moved to [place] and her younger son has developmental disability.     We had an extensive conversation about her care and current condition with concerns for her 9-admissions this past year, with four of her recent admissions occurring over the last two-months. She reports medication compliance and ability to provide care on her own with the assistance of HHA but reports that despite her adherance to medical plan, she requires frequent readmissions.   Discussed at great length how her chronic co-morbidities over time with multiple hospitalizations can place added burden on her body and overall care. Explained that non-compliance with medications and treatment adherence places her at increased risk for further clinical deterioration resulting in recurrent hospitalizations for terminal illnesses and eventually result in death. Addressed her underlying questions and concerns surrounding her non-compliance. Patient expressed understanding of the risks associated with her non-compliant behavior and endorsed willingness to comply with clear explanations. She states - "i'll take whatever you need me to take, I didn't know I had to take Eliquis, give it to me when I get home and i'll take it"    Discussed overall goals of care including advanced directives with the patient. She requested that in the event she went into cardiac arrest she would prefer to allow natural death and defer artificial life support. Explained that given her frequent hospitalization recurrence and significant disease burden she would not likely benefit from aggressive resuscitation efforts such as CPR and intubation, patient in agreement. SHE WANTS all other interventions and treatment, as long as it is explained to her the risks and benefits and how it will be done.     After this conversation decision was made by the patient to change status to DNR/DNI and Allow Natural Death with limited medical management. Patient is amenable to central line discussion as she understands its use and how it will be placed. She initially was afraid of the Central line going "through my neck, that's scary" - explained that it is along the neck and won't AFFECT her swallowing, breathing, speaking or eating.  MOLST form completed, signed, and placed in chart.    Attempted to call SHANNON On the phone but left VM Spoke with the patient AOx3 with mild cognitive impairment, had episodes of delirium due to septic shock. Currently patient is orientated to person, place, time and has understanding of why she is in the hospital but needed reminder of current management.     Identified her , Shannon Hendrickson [phone] as the primary health care surrogate / health care proxy reporting that he is aware of what her wishes are and trusts him to act on her behalf.  She appointed her son, Sunny as the secondary agent. She reports having a daughter who just moved to [place] and her younger son has developmental disability.     We had an extensive conversation about her care and current condition with concerns for her 9-admissions this past year, with four of her recent admissions occurring over the last two-months. She reports medication compliance and ability to provide care on her own with the assistance of HHA but reports that despite her adherence to medical plan, she requires frequent readmissions.   Discussed at great length how her chronic co-morbidities over time with multiple hospitalizations can place added burden on her body and overall care. Explained that non-compliance with medications and treatment adherence places her at increased risk for further clinical deterioration resulting in recurrent hospitalizations for terminal illnesses and eventually result in death. Addressed her underlying questions and concerns surrounding her non-compliance. Patient expressed understanding of the risks associated with her non-compliant behavior and endorsed willingness to comply with clear explanations. She states - "i'll take whatever you need me to take, I didn't know I had to take Eliquis, give it to me when I get home and i'll take it"    Discussed overall goals of care including advanced directives with the patient. She requested that in the event she went into cardiac arrest she would prefer to allow natural death and defer artificial life support. Explained that given her frequent hospitalization recurrence and significant disease burden she would not likely benefit from aggressive resuscitation efforts such as CPR and intubation, patient in agreement. SHE WANTS all other interventions and treatment, as long as it is explained to her the risks and benefits and how it will be done.     After this conversation decision was made by the patient to change status to DNR/DNI and Allow Natural Death with limited medical management. Patient is amenable to central line discussion as she understands its use and how it will be placed. She initially was afraid of the Central line going "through my neck, that's scary" - explained that it is along the neck and won't AFFECT her swallowing, breathing, speaking or eating.  MOLST form completed, signed, and placed in chart.    Attempted to call SHANNON On the phone but left VM

## 2024-02-22 NOTE — DIETITIAN INITIAL EVALUATION ADULT - NSFNSPHYEXAMSKINFT_GEN_A_CORE
Pressure Injury 1: Bilateral:, buttocks, Stage II  Pressure Injury 2: none, none  Pressure Injury 3: none, none  Pressure Injury 4: none, none  Pressure Injury 5: none, none  Pressure Injury 6: none, none  Pressure Injury 7: none, none  Pressure Injury 8: none, none  Pressure Injury 9: none, none  Pressure Injury 10: none, none  Pressure Injury 11: none, none

## 2024-02-22 NOTE — DIETITIAN INITIAL EVALUATION ADULT - PERTINENT MEDS FT
MEDICATIONS  (STANDING):  levothyroxine 125 MICROGram(s) Oral daily  meropenem  IVPB 1000 milliGRAM(s) IV Intermittent every 8 hours  nystatin Powder 1 Application(s) Topical two times a day  pantoprazole  Injectable 40 milliGRAM(s) IV Push two times a day    MEDICATIONS  (PRN):  acetaminophen     Tablet .. 650 milliGRAM(s) Oral every 6 hours PRN Temp greater or equal to 38C (100.4F), Mild Pain (1 - 3)  acetaminophen   IVPB .. 650 milliGRAM(s) IV Intermittent once PRN Temp greater or equal to 38C (100.4F), Mild Pain (1 - 3)  
current

## 2024-02-22 NOTE — CONSULT NOTE ADULT - SUBJECTIVE AND OBJECTIVE BOX
HPI: 73 year old female AAOX3, from home, wheelchair-bound and uses cane occasionally , w/ PMHx COPD on 3 L NC at home, Obesity, Hypothyroidism, Diastolic HF on Lasix, Breast cancer s/p surgery, Asthma, Chronic lymphedema, Kidney cancer s/p partial nephrectomy discharged from [facility] on 2/16/24 for chf exacerbation and new Afib with RVR discharged on Eliquis presenting to ED after a fall at home earlier today. Patient states that she was using her cane to ambulate before tripping and falling landing on her nose, resulting in a wound on her nose that started to bleed prompting her to ED. Patient denies losing consciousness or any preceding symptoms such as headaches, dizziness, and chest pain. Patient reports that she did not take her eliquis since leaving the hospital because she noticed many bruises around her body. She denies any fevers, chills, headaches, dizziness, chest pain, cough, SOB, abd pain, n/v, diarrhea, constipation, hematuria, dysuria, numbness, and weakness.   In ED VS: HR 74, BP 88/56, RR 22, SPo2 87% 4LNC, WBC 34k, Hb 7.9, Cr 2.64, Trop 137, Lactate 5.4, BNP 10k, UA +, Acute 11-12 Rib fractures Left  s/p 2g cefepime, 1 g vanc, 2L NS bolus, started on levophed     of note she states while transporting via EMS, she developed a wound to her right lower extremity with stabbing pain to the area.         Medications acetaminophen     Tablet .. 650 milliGRAM(s) Oral every 6 hours PRN  acetaminophen   IVPB .. 650 milliGRAM(s) IV Intermittent once PRN  levothyroxine 125 MICROGram(s) Oral daily  meropenem  IVPB 1000 milliGRAM(s) IV Intermittent every 8 hours  nystatin Powder 1 Application(s) Topical two times a day  pantoprazole  Injectable 40 milliGRAM(s) IV Push two times a day    FHFamily history of myocardial infarction    Family history of hypertension    Family history of diabetes mellitus (Sibling)    Family history of heart disease (Sibling)    Family history of thyroid cancer (Child)    ,   PMHHypertension    Hyperlipidemia    Anxiety    Graves disease    Kidney cancer, primary, with metastasis from kidney to other site, left    Breast cancer in situ, left    COPD (chronic obstructive pulmonary disease)    Hypothyroid    DVT (deep venous thrombosis)    Obesity    Sleep apnea    Asthma       PSHS/P cholecystectomy    S/p nephrectomy    S/P breast biopsy    History of pelvic surgery    History of eye surgery    History of carpal tunnel release    History of parathyroidectomy    S/P laminectomy        Labs                          7.3    22.35 )-----------( 238      ( 22 Feb 2024 10:00 )             22.6      02-22    135  |  106  |  22<H>  ----------------------------<  170<H>  3.3<L>   |  26  |  0.96    Ca    8.2<L>      22 Feb 2024 10:00  Phos  2.1     02-22  Mg     1.6     02-22    TPro  5.4<L>  /  Alb  1.7<L>  /  TBili  0.7  /  DBili  x   /  AST  22  /  ALT  28  /  AlkPhos  94  02-22     Vital Signs Last 24 Hrs  T(C): 37.8 (22 Feb 2024 15:15), Max: 38.1 (22 Feb 2024 04:45)  T(F): 100 (22 Feb 2024 15:15), Max: 100.6 (22 Feb 2024 04:45)  HR: 95 (22 Feb 2024 15:15) (73 - 105)  BP: 122/56 (22 Feb 2024 15:15) (54/19 - 179/46)  BP(mean): 76 (22 Feb 2024 15:15) (22 - 175)  RR: 19 (22 Feb 2024 15:15) (13 - 30)  SpO2: 100% (22 Feb 2024 15:15) (72% - 100%)    Parameters below as of 21 Feb 2024 19:30  Patient On (Oxygen Delivery Method): nasal cannula      Sedimentation Rate, Erythrocyte: 92 mm/hr (08-27-23 @ 10:45)         C-Reactive Protein, Serum: 24 mg/L (08-28-23 @ 07:01)  C-Reactive Protein, Serum: 15 mg/L (08-27-23 @ 10:45)   WBC Count: 22.35 K/uL *H* (02-22-24 @ 10:00)      ROS: Unremarkable outside HPI      LE Focused Exam  Vascular: DP 1/4 PT 1/4 b/l. CFT <3 secs x 10. Temp Gradient warm to cool b/l. Pedal hair absent. +non-pitting edema b/l  Dermatological: Partial flap, skin tear noted measuring 10x7x0.1cm, No drainage, surrounding erythema or clinical signs of infection.   Neurological: Patient is awake, alert and oriented x3. Gross Epicritic sensation is intact  MSK: + pain on palpation to wound.  Negative desmond sign b/l

## 2024-02-22 NOTE — CHART NOTE - NSCHARTNOTEFT_GEN_A_CORE
Attempted to recheck BP with RN, I heard RN say he was putting BP cuff on patient, she began to get agitated, refused the BP cuff, got verbally abusive. I once again went in to explain to her why she needs the BP cuff, she says she just wants to sleep and thinks it is ridiculous that she needs a BP cuff and has never heard of it. I again explained the dangers of untitrated vasopressors, she was not understanding. Patient does not appear to understand her medical condition despite multiple attempts at reorientation and patient education. Attempted to recheck BP with RN, I heard RN say he was putting BP cuff on patient, she began to get agitated, refused the BP cuff, got verbally abusive. I once again went in to explain to her why she needs the BP cuff, she says she just wants to sleep and thinks it is ridiculous that she needs a BP cuff and has never heard of it being necessary to take so many blood pressures. I again explained the dangers of untitrated vasopressors, she was not understanding. Patient does not appear to understand her medical condition despite multiple attempts at reorientation and patient education. Attempted to recheck BP with RN, I heard RN say he was putting BP cuff on patient, she began to get agitated, refused the BP cuff, got verbally abusive. I once again went in to explain to her why she needs the BP cuff, she says she just wants to sleep and thinks it is ridiculous that she needs a BP cuff and has never heard of it being necessary to take so many blood pressures. I again explained the dangers of untitrated vasopressors, she was not understanding. Patient is AOx3 but she does not understand her medical condition despite multiple attempts at reorientation and patient education.

## 2024-02-22 NOTE — CHART NOTE - NSCHARTNOTEFT_GEN_A_CORE
Informed that patient pulled out her IV again. She is known to be a hard stick and previous attempts required ultrasound. Attempted to place peripheral IV with Dr. Estes PGY3, unsuccessful. Multiple attempts made. Pt also said she did not want to try more attempts, and wants to try later tomorrow. We are aware that she has IV meropenem and other IV meds scheduled, will reschedule for morning when patient is amenable to trying peripheral line placement again. Informed that patient pulled out her IV again. She is known to be a hard stick and previous attempts required ultrasound. Attempted to place peripheral IV with Dr. Estes PGY3 under u/s, unsuccessful. Multiple attempts made. Pt also said she did not want to try more attempts, and wants to try later tomorrow. We are aware that she has IV meropenem and other IV meds scheduled, will reschedule for morning when patient is amenable to trying peripheral line placement again.

## 2024-02-22 NOTE — PHYSICAL THERAPY INITIAL EVALUATION ADULT - GENERAL OBSERVATIONS, REHAB EVAL
awake, alert, NAD; + PICC line on left forearm; +redness and swelling on right arm and forearm; + Allevyn dressing on right leg; hemosiderin stain on both legs

## 2024-02-22 NOTE — CONSULT NOTE ADULT - PROBLEM SELECTOR RECOMMENDATION 6
multiple hospitalizations for complications: cellulitis and worsening lymphedema  previously treated with prolonged course of abx tx and ppx for cellulitis  no dopplers on file show dvt  rest of management as per primary team    supportive care   Podiatry following

## 2024-02-22 NOTE — PROGRESS NOTE ADULT - ASSESSMENT
ASSESSMENT    HPI: 73 year old female AAOX3, from home, wheelchair-bound and uses cane occasionally , w/ PMHx COPD on 3 L NC at home, Obesity, Hypothyroidism, Diastolic HF on Lasix, Breast cancer s/p surgery, Asthma, Chronic lymphedema, Kidney cancer s/p partial nephrectomy discharged from [facility] on 2/16/24 for chf exacerbation and new Afib with RVR discharged on Eliquis presenting to ED after a fall at home earlier today admitted for hypotension likely septic vs hypovolemic shock     _________CNS___________  #Pain  -Acute rib fractures  -Tylenol PRN   # AMS likely 2/2 delirium due to acute illness   - Patient AO x 2-3 , baseline is AO x 3     _________CVS___________  #Septic Shock vs Hypovolemic shock?   -UA positive for infection, c/w abx  -Hb 7, baseline around 12 in beginning of february 2024   - s/p 1 U PRBC   -s/p 2L NS in ED., s/p 500 cc overnight   -wean off  peripheral levo  -f/u cultures, f/u echo      #CHF   -bnp 10 K   -f/u echo  -hold home meds while NPO incase of urgent HD     #AFib with RVR  -hold all anticoagulation     # concern for pericardial effusion on POCUS  - f/u formal TTE     _________RESP__________  #COPD  -on 3LNC at home, satting well in home O2 dose  -no acute issues    ___________GI____________  #GI bleed?  - currently no signs of GIB  -pt denies any blood in stool or any bloody vomit  -drop in Hb possibly GI  - Hb 7.3 10 AM labs   -c/w monitoring HB (f/u repeat at 6 PM)   - resume diet   -Protonix BID,   - c/w monitoring for symptoms   - will hold PPx lovenox       ________ RENAL__________  #ALDAIR  -likely prerenal, resolving   -IVF   FC> primafit     __________MSK___________  #Fall  -PT consulted       ___________ID____________  #Septic shock?  -source UTI, Hx of ESBL UTI in 1/24   -abx cefepime> meropenem   - c/w meropenem   -f;u urine cx and blood cx   INFx Dr Renetta Arreaga following     _________ENDO__________  #hypothyroidism  -TSH- TSH 9.99   -c/w synthroid IV     ______HEME/ONC_______  #Anemia  -Hb 7, baseline 12.5 around begining of february however Hb trended down prior to discharge a few days ago ( was 8.6 day of d/c)   Hb 7.3 , no signs of overt bleed   -f/u repeat CBC 6 PM   -transfuse PRBC if Hb  <7     _________SKIN____________  #RLE ulcer  -podiatry consulted  s/p 1 dose vanc in ED   -dressing change    #Sacral ulcer  -frequent positioning    # lines   2 peripheral IV placed 2/21-  Ex dwell in LUE placed 2/22    ________PROPHY_______  #DVT SCD  #GI PPI      ______GOC/DISPO___________  ICU  FULL CODE        ASSESSMENT    HPI: 73 year old female AAOX3, from home, wheelchair-bound and uses cane occasionally , w/ PMHx COPD on 3 L NC at home, Obesity, Hypothyroidism, Diastolic HF on Lasix, Breast cancer s/p surgery, Asthma, Chronic lymphedema, Kidney cancer s/p partial nephrectomy discharged from [facility] on 2/16/24 for chf exacerbation and new Afib with RVR discharged on Eliquis presenting to ED after a fall at home earlier today admitted for hypotension likely septic vs hypovolemic shock     _________CNS___________  #Pain  -Acute rib fractures  -Tylenol PRN   # AMS likely 2/2 delirium due to acute illness   - Patient AO x 2-3 and  confused  , baseline is AO x 3     _________CVS___________  #Septic Shock vs Hypovolemic shock?   -UA positive for infection, c/w abx  -Hb 7, baseline around 12 in beginning of february 2024   - s/p 1 U PRBC   -s/p 2L NS in ED., s/p 500 cc overnight   -wean off  peripheral levo and discontinue as patient's labile BP is likely in the setting of tremors interfering with BP cuff  -f/u cultures, f/u echo      #CHF   -bnp 10 K   -f/u echo  -hold home meds while NPO incase of urgent HD     #AFib with RVR  -hold all anticoagulation     # concern for pericardial effusion on POCUS  - f/u formal TTE     _________RESP__________  #COPD  -on 3LNC at home, satting well in home O2 dose  -no acute issues    ___________GI____________  #GI bleed?  - currently no signs of GIB  -pt denies any blood in stool or any bloody vomit  -drop in Hb possibly GI  - Hb 7.3 10 AM labs   -c/w monitoring HB (f/u repeat at 6 PM)   - resume diet   -Protonix BID,   - c/w monitoring for symptoms   - will hold PPx lovenox       ________ RENAL__________  #ALDAIR  -likely prerenal, resolving   -IVF   FC> primafit     __________MSK___________  #Fall  -PT consulted       ___________ID____________  #Septic shock?  -source UTI, Hx of ESBL UTI in 1/24   -abx cefepime> meropenem   - c/w meropenem   -f;u urine cx   -blood cx neg  INFx Dr Renetta Arreaga following     _________ENDO__________  #hypothyroidism  -TSH- TSH 9.99   -c/w synthroid IV     ______HEME/ONC_______  #Anemia  -Hb 7, baseline 12.5 around begining of february however Hb trended down prior to discharge a few days ago ( was 8.6 day of d/c)   Hb 7.3 , no signs of overt bleed   -f/u repeat CBC 6 PM   -transfuse PRBC if Hb  <7     _________SKIN____________  #RLE ulcer  -podiatry consulted  - f/u wound care recs  s/p 1 dose vanc in ED   -dressing change    #Sacral ulcer  -frequent positioning    # lines   2 peripheral IV placed 2/21-  Ex dwell in LUE placed 2/22    ________PROPHY_______  #DVT SCD  #GI PPI      ______GOC/DISPO___________  ICU  FULL CODE, palliative consulted    none

## 2024-02-22 NOTE — PROGRESS NOTE ADULT - ASSESSMENT
_________________________________________________________________________________________  ========>>  M E D I C A L   A T T E N D I N G    F O L L O W  U P  N O T E  <<=========  -----------------------------------------------------------------------------------------------------    - Patient seen and examined by me earlier today.   - In summary,  SHEKHAR VASQUEZ is a 73y year old woman admitted with   - Patient today overall doing ok, comfortable, eating OK.     ==================>> REVIEW OF SYSTEM <<=================    GEN: no fever, no chills, no pain  RESP: no SOB, no cough, no sputum  CVS: no chest pain, no palpitations  GI: no abdominal pain, no nausea, no constipation, no diarrhea  : no dysuria, no frequency  Neuro: no headache, no dizziness    ==================>> PHYSICAL EXAM <<=================    GEN: A&O X 3 , NAD , comfortable, pleasant, calm   HEENT: NCAT, PERRL, MMM, hearing intact  CVS: S1S2 , regular , No M/R/G appreciated  PULM: CTA B/L,  no W/R/R appreciated  ABD.: soft. non tender, non distended,  bowel sounds present  Extrem: intact pulses , no edema             ( Note written / Date of service 02-22-24 ( This is certified to be the same as "ENTERED" date above ( for billing purposes)))    ==================>> MEDICATIONS <<====================    MEDICATIONS  (STANDING):  levothyroxine 125 MICROGram(s) Oral daily  meropenem  IVPB 1000 milliGRAM(s) IV Intermittent every 8 hours  nystatin Powder 1 Application(s) Topical two times a day  pantoprazole  Injectable 40 milliGRAM(s) IV Push two times a day    MEDICATIONS  (PRN):  acetaminophen     Tablet .. 650 milliGRAM(s) Oral every 6 hours PRN Temp greater or equal to 38C (100.4F), Mild Pain (1 - 3)  acetaminophen   IVPB .. 650 milliGRAM(s) IV Intermittent once PRN Temp greater or equal to 38C (100.4F), Mild Pain (1 - 3)    ___________  Active diet:  Diet, Regular:   Easy to Chew (EASYTOCHEW)  ___________________    ==================>> VITAL SIGNS <<==================    T(C): 37.9 (02-22-24 @ 16:45), Max: 38.1 (02-22-24 @ 04:45)  HR: 100 (02-22-24 @ 16:45) (73 - 105)  BP: 98/67 (02-22-24 @ 16:45) (54/19 - 184/162)  BP(mean): 78 (02-22-24 @ 16:45)  RR: 15 (02-22-24 @ 16:45) (13 - 30)  SpO2: 100% (02-22-24 @ 16:45) (72% - 100%)     CAPILLARY BLOOD GLUCOSE        I&O's Summary    21 Feb 2024 07:01  -  22 Feb 2024 07:00  --------------------------------------------------------  IN: 1715.9 mL / OUT: 1090 mL / NET: 625.9 mL         ==================>> LAB AND IMAGING <<==================                        7.3    22.35 )-----------( 238      ( 22 Feb 2024 10:00 )             22.6        02-22    135  |  106  |  22<H>  ----------------------------<  170<H>  3.3<L>   |  26  |  0.96    Ca    8.2<L>      22 Feb 2024 10:00  Phos  2.1     02-22  Mg     1.6     02-22    TPro  5.4<L>  /  Alb  1.7<L>  /  TBili  0.7  /  DBili  x   /  AST  22  /  ALT  28  /  AlkPhos  94  02-22    PT/INR - ( 21 Feb 2024 06:57 )   PT: 12.0 sec;   INR: 1.05 ratio         PTT - ( 21 Feb 2024 06:57 )  PTT:25.7 sec                 Urinalysis:  02-20-24 @ 14:25  Leuk. Est.: Moderate  Nirite: Negative  WBC: 15  Blood: Large     salt Crystal: --     calcium crystal: Present     Bili: Moderate     cast: --  color: Dark Yellow  Bacteria: Many  Epith. cell: --  Yeast: --     Ketone: Trace     Protein: 100     Glucose: Negative     sperm: --     Spec.Gravity: 1.023    TSH:      9.99   (02-20-24)       ,     2.76   (02-13-24)           Lipid profile:  (02-14-24)     Total: 127     LDL  : (p)     HDL  :34     TG   :161     HgA1C:   (02-14-24)          (02-14-24)      6.4    ___________________________________________________________________________________  ===============>>  A S S E S S M E N T   A N D   P L A N <<===============  ------------------------------------------------------------------------------------------          -GI/DVT Prophylaxis per protocol.    --------------------------------------------  Case discussed with   Education given on findings and plan of care  ___________________________  H. JANICE Welch.  Pager: [phone]       _________________________________________________________________________________________  ========>>  M E D I C A L   A T T E N D I N G    F O L L O W  U P  N O T E  <<=========  -----------------------------------------------------------------------------------------------------    - Patient seen and examined by me earlier today.   - In summary,  SHEKHAR VASQUEZ is a 73y year old woman admitted with hypotension   - Patient today overall doing ok, comfortable, eating OK.      patient remains on pressors but was refusing central line placement     ==================>> REVIEW OF SYSTEM <<=================    GEN: no fever, no chills, no pain  RESP: no SOB, no cough, no sputum  CVS: no chest pain, no palpitations  GI: no abdominal pain, no nausea  : no dysuria, no frequency  Neuro: no headache, no dizziness    ==================>> PHYSICAL EXAM <<=================    GEN: A&O X 2-3 , NAD , comfortable, pleasant, calm , forgetful at times   HEENT: NCAT, PERRL, MMM, hearing intact  CVS: S1S2 , regular , No M/R/G appreciated  PULM: CTA B/L,  no W/R/R appreciated  ABD.: soft. non tender, non distended,  obese   Extrem: intact pulses , chronic LE lymphedema .. scattered bruises / ecchymosis          ( Note written / Date of service 02-22-24 ( This is certified to be the same as "ENTERED" date above ( for billing purposes)))    ==================>> MEDICATIONS <<====================    MEDICATIONS  (STANDING):  levothyroxine 125 MICROGram(s) Oral daily  meropenem  IVPB 1000 milliGRAM(s) IV Intermittent every 8 hours  nystatin Powder 1 Application(s) Topical two times a day  pantoprazole  Injectable 40 milliGRAM(s) IV Push two times a day    MEDICATIONS  (PRN):  acetaminophen     Tablet .. 650 milliGRAM(s) Oral every 6 hours PRN Temp greater or equal to 38C (100.4F), Mild Pain (1 - 3)  acetaminophen   IVPB .. 650 milliGRAM(s) IV Intermittent once PRN Temp greater or equal to 38C (100.4F), Mild Pain (1 - 3)    ___________  Active diet:  Diet, Regular:   Easy to Chew (EASYTOCHEW)  ___________________    ==================>> VITAL SIGNS <<==================    T(C): 37.9 (02-22-24 @ 16:45), Max: 38.1 (02-22-24 @ 04:45)  HR: 100 (02-22-24 @ 16:45) (73 - 105)  BP: 98/67 (02-22-24 @ 16:45) (54/19 - 184/162)  BP(mean): 78 (02-22-24 @ 16:45)  RR: 15 (02-22-24 @ 16:45) (13 - 30)  SpO2: 100% (02-22-24 @ 16:45) (72% - 100%)       I&O's Summary    21 Feb 2024 07:01  -  22 Feb 2024 07:00  --------------------------------------------------------  IN: 1715.9 mL / OUT: 1090 mL / NET: 625.9 mL         ==================>> LAB AND IMAGING <<==================                        7.3    22.35 )-----------( 238      ( 22 Feb 2024 10:00 )             22.6        WBC count:   19.17 <<== ,  22.35 <<== ,  34.98 <<== ,  41.08 <<== ,  34.23 <<==   Hemoglobin:   7.0 <<==,  7.3 <<==,  8.7 <<==,  6.9 <<==,  7.9 <<==    02-22    135  |  106  |  22<H>  ----------------------------<  170<H>  3.3<L>   |  26  |  0.96    Creatinine:  0.96  <<==, 1.56  <<==, 2.29  <<==, 2.36  <<==, 2.64  <<==    Ca    8.2<L>      22 Feb 2024 10:00  Phos  2.1     02-22  Mg     1.6     02-22    TPro  5.4<L>  /  Alb  1.7<L>  /  TBili  0.7  /  DBili  x   /  AST  22  /  ALT  28  /  AlkPhos  94  02-22    PT/INR - ( 21 Feb 2024 06:57 )   PT: 12.0 sec;   INR: 1.05 ratio         PTT - ( 21 Feb 2024 06:57 )  PTT:25.7 sec                 Urinalysis:  02-20-24 @ 14:25  Leuk. Est.: Moderate  Nirite: Negative  WBC: 15  Blood: Large     salt Crystal: --     calcium crystal: Present     Bili: Moderate     cast: --  color: Dark Yellow  Bacteria: Many  Epith. cell: --  Yeast: --     Ketone: Trace     Protein: 100     Glucose: Negative     sperm: --     Spec.Gravity: 1.023    TSH:      9.99   (02-20-24)       ,     2.76   (02-13-24)           Lipid profile:  (02-14-24)     Total: 127     LDL  : (p)     HDL  :34     TG   :161     HgA1C:   (02-14-24)          (02-14-24)      6.4    ___________________________________________________________________________________  ===============>>  A S S E S S M E N T   A N D   P L A N <<===============  ------------------------------------------------------------------------------------------    73 year old female AAOX3, from home, wheelchair-bound and uses cane occasionally , w/ PMHx COPD on 3 L NC at home, Obesity, Hypothyroidism, Diastolic HF on Lasix, Breast cancer s/p surgery, Asthma, Chronic lymphedema, Kidney cancer s/p partial nephrectomy discharged from [facility] on 2/16/24 for chf exacerbation and new Afib with RVR discharged on Eliquis presenting to ED after a fall at home earlier today admitted for hypotension likely septic vs hypovolemic shock.  patient admitted to the ICU for further stabilization  patient overall improved       Assessment:   Shock ( septic vs hypovolemic from acute blood loss)   Lactic acidosis   ALDAIR on CKD III  Hematuria, possible UTI  Acute on chronic anemia   Multiple hematomas   Recurrent falls  hematomas  acute rib fractures  Heart failure with preserved EF   COPD   afib   anxiety    =======>>>    ICU care and management appreciated   post 1 unit PRBC     Hgb stable post transfusion  S/p IV fluid resuscitation     Hold diuretics and antihypertensives     pressors as needed and wean as able      Hemodynamic monitoring     cardio follow up      Hold anticoagulation ( will need to re-eval long term AC given above)    F/u cultures    on Broad spectrum antibiotics    ID on board    Cont. home COPD medications    Podiatry and wound care consult    --------------------------------------------  Case discussed with PMD ( outpatient carido) as well and updated   Education given on findings and plan of care.  ___________________________  Will follow with you.  Thank you,  DIANE Welch D.O.  Pager: [phone]      -GI/DVT Prophylaxis per protocol.    --------------------------------------------  Case discussed with   Education given on findings and plan of care  ___________________________  DIANE Welch D.O.  Pager: [phone]       _________________________________________________________________________________________  ========>>  M E D I C A L   A T T E N D I N G    F O L L O W  U P  N O T E  <<=========  -----------------------------------------------------------------------------------------------------    - Patient seen and examined by me earlier today.   - In summary,  SHEKHAR VASQUEZ is a 73y year old woman admitted with hypotension   - Patient today overall doing ok, comfortable, eating OK.      patient remains on pressors but was refusing central line placement     ==================>> REVIEW OF SYSTEM <<=================    GEN: no fever, no chills, no pain  RESP: no SOB, no cough, no sputum  CVS: no chest pain, no palpitations  GI: no abdominal pain, no nausea  : no dysuria, no frequency  Neuro: no headache, no dizziness    ==================>> PHYSICAL EXAM <<=================    GEN: A&O X 2-3 , NAD , comfortable, pleasant, calm , forgetful at times   HEENT: NCAT, PERRL, MMM, hearing intact  CVS: S1S2 , regular , No M/R/G appreciated  PULM: CTA B/L,  no W/R/R appreciated  ABD.: soft. non tender, non distended,  obese   Extrem: intact pulses , chronic LE lymphedema .. scattered bruises / ecchymosis          ( Note written / Date of service 02-22-24 ( This is certified to be the same as "ENTERED" date above ( for billing purposes)))    ==================>> MEDICATIONS <<====================    MEDICATIONS  (STANDING):  levothyroxine 125 MICROGram(s) Oral daily  meropenem  IVPB 1000 milliGRAM(s) IV Intermittent every 8 hours  nystatin Powder 1 Application(s) Topical two times a day  pantoprazole  Injectable 40 milliGRAM(s) IV Push two times a day    MEDICATIONS  (PRN):  acetaminophen     Tablet .. 650 milliGRAM(s) Oral every 6 hours PRN Temp greater or equal to 38C (100.4F), Mild Pain (1 - 3)  acetaminophen   IVPB .. 650 milliGRAM(s) IV Intermittent once PRN Temp greater or equal to 38C (100.4F), Mild Pain (1 - 3)    ___________  Active diet:  Diet, Regular:   Easy to Chew (EASYTOCHEW)  ___________________    ==================>> VITAL SIGNS <<==================    T(C): 37.9 (02-22-24 @ 16:45), Max: 38.1 (02-22-24 @ 04:45)  HR: 100 (02-22-24 @ 16:45) (73 - 105)  BP: 98/67 (02-22-24 @ 16:45) (54/19 - 184/162)  BP(mean): 78 (02-22-24 @ 16:45)  RR: 15 (02-22-24 @ 16:45) (13 - 30)  SpO2: 100% (02-22-24 @ 16:45) (72% - 100%)       I&O's Summary    21 Feb 2024 07:01  -  22 Feb 2024 07:00  --------------------------------------------------------  IN: 1715.9 mL / OUT: 1090 mL / NET: 625.9 mL         ==================>> LAB AND IMAGING <<==================                        7.3    22.35 )-----------( 238      ( 22 Feb 2024 10:00 )             22.6        WBC count:   19.17 <<== ,  22.35 <<== ,  34.98 <<== ,  41.08 <<== ,  34.23 <<==   Hemoglobin:   7.0 <<==,  7.3 <<==,  8.7 <<==,  6.9 <<==,  7.9 <<==    02-22    135  |  106  |  22<H>  ----------------------------<  170<H>  3.3<L>   |  26  |  0.96    Creatinine:  0.96  <<==, 1.56  <<==, 2.29  <<==, 2.36  <<==, 2.64  <<==    Ca    8.2<L>      22 Feb 2024 10:00  Phos  2.1     02-22  Mg     1.6     02-22    TPro  5.4<L>  /  Alb  1.7<L>  /  TBili  0.7  /  DBili  x   /  AST  22  /  ALT  28  /  AlkPhos  94  02-22    PT/INR - ( 21 Feb 2024 06:57 )   PT: 12.0 sec;   INR: 1.05 ratio         PTT - ( 21 Feb 2024 06:57 )  PTT:25.7 sec                 Urinalysis:  02-20-24 @ 14:25  Leuk. Est.: Moderate  Nirite: Negative  WBC: 15  Blood: Large     salt Crystal: --     calcium crystal: Present     Bili: Moderate     cast: --  color: Dark Yellow  Bacteria: Many  Epith. cell: --  Yeast: --     Ketone: Trace     Protein: 100     Glucose: Negative     sperm: --     Spec.Gravity: 1.023    TSH:      9.99   (02-20-24)       ,     2.76   (02-13-24)           Lipid profile:  (02-14-24)     Total: 127     LDL  : (p)     HDL  :34     TG   :161     HgA1C:   (02-14-24)          (02-14-24)      6.4    ____________________________    M I C R O B I O L O G Y :    Culture - Urine (collected 20 Feb 2024 14:25)  Source: Clean Catch Clean Catch (Midstream)  Preliminary Report (22 Feb 2024 13:20):    >100,000 CFU/ml Enterococcus faecalis    >100,000 CFU/ml Enterococcus faecium    Culture - Blood (collected 20 Feb 2024 11:00)  Source: .Blood Blood-Peripheral  Preliminary Report (22 Feb 2024 19:02):    No growth at 48 Hours    Culture - Blood (collected 20 Feb 2024 10:55)  Source: .Blood Blood-Peripheral  Preliminary Report (22 Feb 2024 19:02):    No growth at 48 Hours      ___________________________________________________________________________________  ===============>>  A S S E S S M E N T   A N D   P L A N <<===============  ------------------------------------------------------------------------------------------    73 year old female AAOX3, from home, wheelchair-bound and uses cane occasionally , w/ PMHx COPD on 3 L NC at home, Obesity, Hypothyroidism, Diastolic HF on Lasix, Breast cancer s/p surgery, Asthma, Chronic lymphedema, Kidney cancer s/p partial nephrectomy discharged from [facility] on 2/16/24 for chf exacerbation and new Afib with RVR discharged on Eliquis presenting to ED after a fall at home earlier today admitted for hypotension likely septic vs hypovolemic shock.  patient admitted to the ICU for further stabilization  patient overall improved       Assessment:   Shock ( septic vs hypovolemic from acute blood loss)   Lactic acidosis   ALDAIR on CKD III  Hematuria, hemorrhagic UTI  Acute on chronic anemia   Multiple hematomas   Recurrent falls  hematomas  acute rib fractures  Heart failure with preserved EF   COPD   afib   anxiety    =======>>>    ICU care and management appreciated   post 1 unit PRBC     Hgb stable post transfusion  S/p IV fluid resuscitation     Hold diuretics and antihypertensives     pressors and wean as able      Hemodynamic monitoring     cardio follow up      Hold anticoagulation ( will need to re-eval long term AC given above)    F/u cultures / sensitivities     on Broad spectrum antibiotics    ID on board    Cont. home COPD medications    Podiatry and wound care follow up    palliative appreciated     --------------------------------------------  Case discussed with patient, , paliative team, RN..   Education given on findings and plan of care.  ___________________________  Will follow with you.  Thank you,  DIANE Welch D.O.  Pager: [phone]      -GI/DVT Prophylaxis per protocol.    --------------------------------------------  Case discussed with   Education given on findings and plan of care  ___________________________  DIANE Welch D.O.  Pager: [phone]

## 2024-02-22 NOTE — PHYSICAL THERAPY INITIAL EVALUATION ADULT - PATIENT/FAMILY/SIGNIFICANT OTHER GOALS STATEMENT, PT EVAL
patient wants to be able to perform transfers and short distance mobility to a chair or commode with no assistance, while using a rolling walker patient wants to be able to perform transfers and short distance mobility to a chair or commode with limited assistance, while using a rolling walker

## 2024-02-22 NOTE — CONSULT NOTE ADULT - ATTENDING COMMENTS
septic shock on peripheral pressor due to UTI, MORBID obesity with COPD on 2-3 L at home, moderate pulm HTN, Diastolic HTN, extensive PAD with venous stasis in LEs, bedbound now - can benefit from Home visiting program - Palliative SW to follow up    High risk of readmissions and mortality

## 2024-02-22 NOTE — DIETITIAN INITIAL EVALUATION ADULT - PERTINENT LABORATORY DATA
H 02-21    136  |  109<H>  |  33<H>  ----------------------------<  119<H>  4.3   |  17<L>  |  1.56<H>    Ca    7.1<L>      21 Feb 2024 04:28  Phos  3.9     02-21  Mg     1.2     02-21    TPro  5.4<L>  /  Alb  1.8<L>  /  TBili  0.9  /  DBili  x   /  AST  61<H>  /  ALT  35  /  AlkPhos  88  02-21  A1C with Estimated Average Glucose Result: 6.4 % (02-14-24 @ 01:48)

## 2024-02-23 DIAGNOSIS — E43 UNSPECIFIED SEVERE PROTEIN-CALORIE MALNUTRITION: ICD-10-CM

## 2024-02-23 DIAGNOSIS — D64.9 ANEMIA, UNSPECIFIED: ICD-10-CM

## 2024-02-23 LAB
-  AMPICILLIN: SIGNIFICANT CHANGE UP
-  AMPICILLIN: SIGNIFICANT CHANGE UP
-  CIPROFLOXACIN: SIGNIFICANT CHANGE UP
-  CIPROFLOXACIN: SIGNIFICANT CHANGE UP
-  DAPTOMYCIN: SIGNIFICANT CHANGE UP
-  LEVOFLOXACIN: SIGNIFICANT CHANGE UP
-  LEVOFLOXACIN: SIGNIFICANT CHANGE UP
-  LINEZOLID: SIGNIFICANT CHANGE UP
-  NITROFURANTOIN: SIGNIFICANT CHANGE UP
-  NITROFURANTOIN: SIGNIFICANT CHANGE UP
-  TETRACYCLINE: SIGNIFICANT CHANGE UP
-  TETRACYCLINE: SIGNIFICANT CHANGE UP
-  VANCOMYCIN: SIGNIFICANT CHANGE UP
-  VANCOMYCIN: SIGNIFICANT CHANGE UP
ALBUMIN SERPL ELPH-MCNC: 1.6 G/DL — LOW (ref 3.5–5)
ALP SERPL-CCNC: 90 U/L — SIGNIFICANT CHANGE UP (ref 40–120)
ALT FLD-CCNC: 23 U/L DA — SIGNIFICANT CHANGE UP (ref 10–60)
ANION GAP SERPL CALC-SCNC: 4 MMOL/L — LOW (ref 5–17)
AST SERPL-CCNC: 18 U/L — SIGNIFICANT CHANGE UP (ref 10–40)
BASOPHILS # BLD AUTO: 0.03 K/UL — SIGNIFICANT CHANGE UP (ref 0–0.2)
BASOPHILS NFR BLD AUTO: 0.2 % — SIGNIFICANT CHANGE UP (ref 0–2)
BILIRUB SERPL-MCNC: 0.6 MG/DL — SIGNIFICANT CHANGE UP (ref 0.2–1.2)
BLD GP AB SCN SERPL QL: SIGNIFICANT CHANGE UP
BUN SERPL-MCNC: 17 MG/DL — SIGNIFICANT CHANGE UP (ref 7–18)
CALCIUM SERPL-MCNC: 8.4 MG/DL — SIGNIFICANT CHANGE UP (ref 8.4–10.5)
CHLORIDE SERPL-SCNC: 109 MMOL/L — HIGH (ref 96–108)
CO2 SERPL-SCNC: 27 MMOL/L — SIGNIFICANT CHANGE UP (ref 22–31)
CREAT SERPL-MCNC: 0.62 MG/DL — SIGNIFICANT CHANGE UP (ref 0.5–1.3)
CULTURE RESULTS: ABNORMAL
EGFR: 93 ML/MIN/1.73M2 — SIGNIFICANT CHANGE UP
EOSINOPHIL # BLD AUTO: 0.14 K/UL — SIGNIFICANT CHANGE UP (ref 0–0.5)
EOSINOPHIL NFR BLD AUTO: 1 % — SIGNIFICANT CHANGE UP (ref 0–6)
GIANT PLATELETS BLD QL SMEAR: PRESENT — SIGNIFICANT CHANGE UP
GLUCOSE SERPL-MCNC: 92 MG/DL — SIGNIFICANT CHANGE UP (ref 70–99)
HCT VFR BLD CALC: 21.4 % — LOW (ref 34.5–45)
HCT VFR BLD CALC: 21.7 % — LOW (ref 34.5–45)
HGB BLD-MCNC: 6.7 G/DL — CRITICAL LOW (ref 11.5–15.5)
HGB BLD-MCNC: 6.9 G/DL — CRITICAL LOW (ref 11.5–15.5)
HYPOCHROMIA BLD QL: SIGNIFICANT CHANGE UP
IMM GRANULOCYTES NFR BLD AUTO: 1.9 % — HIGH (ref 0–0.9)
LG PLATELETS BLD QL AUTO: SLIGHT — SIGNIFICANT CHANGE UP
LYMPHOCYTES # BLD AUTO: 14.1 % — SIGNIFICANT CHANGE UP (ref 13–44)
LYMPHOCYTES # BLD AUTO: 2.01 K/UL — SIGNIFICANT CHANGE UP (ref 1–3.3)
MAGNESIUM SERPL-MCNC: 1.5 MG/DL — LOW (ref 1.6–2.6)
MANUAL SMEAR VERIFICATION: SIGNIFICANT CHANGE UP
MCHC RBC-ENTMCNC: 29.8 PG — SIGNIFICANT CHANGE UP (ref 27–34)
MCHC RBC-ENTMCNC: 30.9 GM/DL — LOW (ref 32–36)
MCHC RBC-ENTMCNC: 31.1 PG — SIGNIFICANT CHANGE UP (ref 27–34)
MCHC RBC-ENTMCNC: 32.2 GM/DL — SIGNIFICANT CHANGE UP (ref 32–36)
MCV RBC AUTO: 96.4 FL — SIGNIFICANT CHANGE UP (ref 80–100)
MCV RBC AUTO: 96.4 FL — SIGNIFICANT CHANGE UP (ref 80–100)
METHOD TYPE: SIGNIFICANT CHANGE UP
METHOD TYPE: SIGNIFICANT CHANGE UP
MONOCYTES # BLD AUTO: 0.96 K/UL — HIGH (ref 0–0.9)
MONOCYTES NFR BLD AUTO: 6.8 % — SIGNIFICANT CHANGE UP (ref 2–14)
NEUTROPHILS # BLD AUTO: 10.81 K/UL — HIGH (ref 1.8–7.4)
NEUTROPHILS NFR BLD AUTO: 76 % — SIGNIFICANT CHANGE UP (ref 43–77)
NRBC # BLD: 0 /100 WBCS — SIGNIFICANT CHANGE UP (ref 0–0)
NRBC # BLD: 0 /100 WBCS — SIGNIFICANT CHANGE UP (ref 0–0)
ORGANISM # SPEC MICROSCOPIC CNT: ABNORMAL
PHOSPHATE SERPL-MCNC: 1.9 MG/DL — LOW (ref 2.5–4.5)
PLAT MORPH BLD: NORMAL — SIGNIFICANT CHANGE UP
PLATELET # BLD AUTO: 205 K/UL — SIGNIFICANT CHANGE UP (ref 150–400)
PLATELET # BLD AUTO: 213 K/UL — SIGNIFICANT CHANGE UP (ref 150–400)
PLATELET COUNT - ESTIMATE: NORMAL — SIGNIFICANT CHANGE UP
POIKILOCYTOSIS BLD QL AUTO: SLIGHT — SIGNIFICANT CHANGE UP
POLYCHROMASIA BLD QL SMEAR: SLIGHT — SIGNIFICANT CHANGE UP
POTASSIUM SERPL-MCNC: 3.6 MMOL/L — SIGNIFICANT CHANGE UP (ref 3.5–5.3)
POTASSIUM SERPL-SCNC: 3.6 MMOL/L — SIGNIFICANT CHANGE UP (ref 3.5–5.3)
PROT SERPL-MCNC: 5 G/DL — LOW (ref 6–8.3)
RBC # BLD: 2.22 M/UL — LOW (ref 3.8–5.2)
RBC # BLD: 2.25 M/UL — LOW (ref 3.8–5.2)
RBC # FLD: 18.8 % — HIGH (ref 10.3–14.5)
RBC # FLD: 18.9 % — HIGH (ref 10.3–14.5)
RBC BLD AUTO: ABNORMAL
SCHISTOCYTES BLD QL AUTO: SLIGHT — SIGNIFICANT CHANGE UP
SODIUM SERPL-SCNC: 140 MMOL/L — SIGNIFICANT CHANGE UP (ref 135–145)
SPECIMEN SOURCE: SIGNIFICANT CHANGE UP
WBC # BLD: 14.22 K/UL — HIGH (ref 3.8–10.5)
WBC # BLD: 14.26 K/UL — HIGH (ref 3.8–10.5)
WBC # FLD AUTO: 14.22 K/UL — HIGH (ref 3.8–10.5)
WBC # FLD AUTO: 14.26 K/UL — HIGH (ref 3.8–10.5)

## 2024-02-23 RX ORDER — LANOLIN ALCOHOL/MO/W.PET/CERES
3 CREAM (GRAM) TOPICAL AT BEDTIME
Refills: 0 | Status: DISCONTINUED | OUTPATIENT
Start: 2024-02-23 | End: 2024-02-28

## 2024-02-23 RX ORDER — SODIUM,POTASSIUM PHOSPHATES 278-250MG
1 POWDER IN PACKET (EA) ORAL ONCE
Refills: 0 | Status: COMPLETED | OUTPATIENT
Start: 2024-02-23 | End: 2024-02-23

## 2024-02-23 RX ORDER — MAGNESIUM SULFATE 500 MG/ML
2 VIAL (ML) INJECTION ONCE
Refills: 0 | Status: COMPLETED | OUTPATIENT
Start: 2024-02-23 | End: 2024-02-23

## 2024-02-23 RX ORDER — CHLORHEXIDINE GLUCONATE 213 G/1000ML
1 SOLUTION TOPICAL
Refills: 0 | Status: DISCONTINUED | OUTPATIENT
Start: 2024-02-23 | End: 2024-02-28

## 2024-02-23 RX ORDER — LINEZOLID 600 MG/300ML
600 INJECTION, SOLUTION INTRAVENOUS ONCE
Refills: 0 | Status: COMPLETED | OUTPATIENT
Start: 2024-02-23 | End: 2024-02-23

## 2024-02-23 RX ORDER — SODIUM,POTASSIUM PHOSPHATES 278-250MG
1 POWDER IN PACKET (EA) ORAL EVERY 4 HOURS
Refills: 0 | Status: COMPLETED | OUTPATIENT
Start: 2024-02-23 | End: 2024-02-23

## 2024-02-23 RX ORDER — LINEZOLID 600 MG/300ML
600 INJECTION, SOLUTION INTRAVENOUS EVERY 12 HOURS
Refills: 0 | Status: DISCONTINUED | OUTPATIENT
Start: 2024-02-24 | End: 2024-02-26

## 2024-02-23 RX ORDER — LINEZOLID 600 MG/300ML
INJECTION, SOLUTION INTRAVENOUS
Refills: 0 | Status: DISCONTINUED | OUTPATIENT
Start: 2024-02-23 | End: 2024-02-26

## 2024-02-23 RX ORDER — PANTOPRAZOLE SODIUM 20 MG/1
40 TABLET, DELAYED RELEASE ORAL DAILY
Refills: 0 | Status: DISCONTINUED | OUTPATIENT
Start: 2024-02-24 | End: 2024-02-28

## 2024-02-23 RX ADMIN — Medication 125 MICROGRAM(S): at 05:08

## 2024-02-23 RX ADMIN — Medication 1 PACKET(S): at 15:58

## 2024-02-23 RX ADMIN — Medication 1 PACKET(S): at 11:19

## 2024-02-23 RX ADMIN — NYSTATIN CREAM 1 APPLICATION(S): 100000 CREAM TOPICAL at 05:59

## 2024-02-23 RX ADMIN — Medication 1 PACKET(S): at 06:32

## 2024-02-23 RX ADMIN — Medication 25 GRAM(S): at 08:48

## 2024-02-23 RX ADMIN — NYSTATIN CREAM 1 APPLICATION(S): 100000 CREAM TOPICAL at 18:28

## 2024-02-23 RX ADMIN — PANTOPRAZOLE SODIUM 40 MILLIGRAM(S): 20 TABLET, DELAYED RELEASE ORAL at 09:33

## 2024-02-23 RX ADMIN — LINEZOLID 300 MILLIGRAM(S): 600 INJECTION, SOLUTION INTRAVENOUS at 18:28

## 2024-02-23 RX ADMIN — Medication 3 MILLIGRAM(S): at 22:33

## 2024-02-23 RX ADMIN — MEROPENEM 100 MILLIGRAM(S): 1 INJECTION INTRAVENOUS at 08:49

## 2024-02-23 NOTE — PROGRESS NOTE ADULT - PROBLEM SELECTOR PLAN 6
multiple hospitalizations for complications: cellulitis and worsening lymphedema  previously treated with prolonged course of abx tx and ppx for cellulitis  no dopplers on file show dvt  rest of management as per primary team    supportive care   Podiatry following.

## 2024-02-23 NOTE — PROGRESS NOTE ADULT - PROBLEM SELECTOR PLAN 8
not in exacerbation   COPD, class E per GOLD criteria 2023 on 2-3L NC at baseline  most recent COPD exacerbation requiring hospitalization 2/2 (+) hMPV last month  rest of management as per primary team.

## 2024-02-23 NOTE — PROGRESS NOTE ADULT - ASSESSMENT
_________________________________________________________________________________________  ========>>  M E D I C A L   A T T E N D I N G    F O L L O W  U P  N O T E  <<=========  -----------------------------------------------------------------------------------------------------    - Patient seen and examined by me earlier today.   - In summary,  SHEKHAR VASQUEZ is a 73y year old woman admitted with hypotension   - Patient today overall doing ok, comfortable, eating OK.      patient off pressors , getting PRBC today X1... otherwise overall improved planned for transfer to medical floor     ==================>> REVIEW OF SYSTEM <<=================    GEN: no fever, no chills, no pain  RESP: no SOB, no cough, no sputum  CVS: no chest pain, no palpitations  GI: no abdominal pain, no nausea  : no dysuria, no frequency  Neuro: no headache, no dizziness    ==================>> PHYSICAL EXAM <<=================    GEN: A&O X 2-3 , NAD , comfortable, pleasant, calm   HEENT: NCAT, PERRL, MMM, hearing intact  CVS: S1S2 , regular , No M/R/G appreciated  PULM: CTA B/L,  no W/R/R appreciated  ABD.: soft. non tender, non distended,  obese   Extrem: intact pulses , chronic LE lymphedema .. scattered bruises / ecchymosis       ( Note written / Date of service 02-23-24 ( This is certified to be the same as "ENTERED" date above ( for billing purposes)))    ==================>> MEDICATIONS <<====================    chlorhexidine 2% Cloths 1 Application(s) Topical <User Schedule>  levothyroxine 125 MICROGram(s) Oral daily  linezolid  IVPB      linezolid  IVPB 600 milliGRAM(s) IV Intermittent once  nystatin Powder 1 Application(s) Topical two times a day  potassium phosphate / sodium phosphate Powder (PHOS-NaK) 1 Packet(s) Oral once    MEDICATIONS  (PRN):  acetaminophen     Tablet .. 650 milliGRAM(s) Oral every 6 hours PRN Temp greater or equal to 38C (100.4F), Mild Pain (1 - 3)    ___________  Active diet:  Diet, Regular:   Easy to Chew (EASYTOCHEW)  ___________________    ==================>> VITAL SIGNS <<==================    Height (cm): 152.9  Vital Signs Last 24 HrsT(C): 37.4 (02-23-24 @ 15:00)  T(F): 99.3 (02-23-24 @ 15:00), Max: 100.2 (02-22-24 @ 16:00)  HR: 89 (02-23-24 @ 15:00) (73 - 101)  BP: 131/72 (02-23-24 @ 15:00)  RR: 21 (02-23-24 @ 15:00) (10 - 30)  SpO2: 100% (02-23-24 @ 15:00) (97% - 100%)       ==================>> LAB AND IMAGING <<==================                        6.7    14.26 )-----------( 205      ( 23 Feb 2024 09:14 )             21.7        02-23    140  |  109<H>  |  17  ----------------------------<  92  3.6   |  27  |  0.62    Ca    8.4      23 Feb 2024 05:39  Phos  1.9     02-23  Mg     1.5     02-23    TPro  5.0<L>  /  Alb  1.6<L>  /  TBili  0.6  /  DBili  x   /  AST  18  /  ALT  23  /  AlkPhos  90  02-23    WBC count:   14.26 <<== ,  14.22 <<== ,  19.17 <<== ,  22.35 <<== ,  34.98 <<== ,  41.08 <<==   Hemoglobin:   6.7 <<==,  6.9 <<==,  7.0 <<==,  7.3 <<==,  8.7 <<==,  6.9 <<==  platelets:  205 <==, 213 <==, 208 <==, 238 <==, 215 <==, 314 <==, 323 <==    Creatinine:  0.62  <<==, 0.96  <<==, 1.56  <<==, 2.29  <<==, 2.36  <<==, 2.64  <<==  Sodium:   140  <==, 135  <==, 136  <==, 136  <==, 135  <==, 135  <==       AST:          18(02-23) <== , 22(02-22) <== , 61(02-21) <== , 56(02-20) <== , 53(02-20) <==      ALT:        23(02-23)  <== , 28(02-22)  <== , 35(02-21)  <== , 35(02-20)  <== , 34(02-20)  <==      AP:        90(02-23)  <=, 94(02-22)  <=, 88(02-21)  <=, 84(02-20)  <=, 90(02-20)  <=     Bili:        0.6(02-23)  <=, 0.7(02-22)  <=, 0.9(02-21)  <=, 0.8(02-20)  <=, 0.7(02-20)  <=    ____________________________    M I C R O B I O L O G Y :    Culture - Urine (collected 20 Feb 2024 14:25)  Source: Clean Catch Clean Catch (Midstream)  Final Report (23 Feb 2024 14:20):    >100,000 CFU/ml Enterococcus faecalis    >100,000 CFU/ml Enterococcus faecium (vancomycin resistant)  Organism: Enterococcus faecalis  Enterococcus faecium (vancomycin resistant) (23 Feb 2024 14:20)  Organism: Enterococcus faecium (vancomycin resistant) (23 Feb 2024 14:20)    Sensitivities:      Method Type: MARIA M      -  Ampicillin: R >8 Predicts results to ampicillin/sulbactam, amoxacillin-clavulanate and  piperacillin-tazobactam.      -  Ciprofloxacin: R >2      -  Daptomycin: SDD 2 The breakpoint for SDD (susceptible dose dependent)is based on a dosage regimen of 8-12 mg/kg administered every 24 h in adults and is intended for serious infections due to E. faecium. Consultation with an infectious diseases specialist is recommended.      -  Levofloxacin: R >4      -  Linezolid: S 1      -  Nitrofurantoin: R >64 Should not be used to treat pyelonephritis.      -  Tetracycline: R >8      -  Vancomycin: R >16  Organism: Enterococcus faecalis (23 Feb 2024 14:20)    Sensitivities:      Method Type: MARIA M      -  Ampicillin: S <=2 Predicts results to ampicillin/sulbactam, amoxacillin-clavulanate and  piperacillin-tazobactam.      -  Ciprofloxacin: S <=1      -  Levofloxacin: S <=0.5      -  Nitrofurantoin: S <=32 Should not be used to treat pyelonephritis.      -  Tetracycline: R >8      -  Vancomycin: S 2    Culture - Blood (collected 20 Feb 2024 11:00)  Source: .Blood Blood-Peripheral  Preliminary Report (22 Feb 2024 19:02):    No growth at 48 Hours    Culture - Blood (collected 20 Feb 2024 10:55)  Source: .Blood Blood-Peripheral  Preliminary Report (22 Feb 2024 19:02):    No growth at 48 Hours      ___________________________________________________________________________________  ===============>>  A S S E S S M E N T   A N D   P L A N <<===============  ------------------------------------------------------------------------------------------    73 year old female AAOX3, from home, wheelchair-bound and uses cane occasionally , w/ PMHx COPD on 3 L NC at home, Obesity, Hypothyroidism, Diastolic HF on Lasix, Breast cancer s/p surgery, Asthma, Chronic lymphedema, Kidney cancer s/p partial nephrectomy discharged from [facility] on 2/16/24 for chf exacerbation and new Afib with RVR discharged on Eliquis presenting to ED after a fall at home earlier today admitted for hypotension likely septic vs hypovolemic shock.  patient admitted to the ICU for further stabilization  patient overall improved     Assessment:  sepsis / septic Shock   acute blood loss anemia   ALDAIR on CKD III  Hematuria, hemorrhagic UTI  Acute on chronic anemia   Multiple hematomas   Recurrent falls  hematomas  acute rib fractures  Heart failure with preserved EF   COPD   afib   anxiety    =======>>>    ICU care and management appreciated   2 unit PRBC transfusion so far      monitor Hgb closely post transfusion  S/p IV fluid resuscitation     Held diuretics and antihypertensives     post pressor support : improved      Hemodynamic monitoring     cardio follow up      Hold anticoagulation ( will need to re-eval long term AC given above)    F/u cultures / sensitivities     on Broad spectrum antibiotics    ID on board    Cont. home COPD medications    Podiatry and wound care follow up    palliative appreciated      PT DNR    --------------------------------------------  Case discussed with patient, RN..   Education given on findings and plan of care.  ___________________________  Will follow with you.  Thank you,  DIANE Welch D.O.  Pager: [phone]      -GI/DVT Prophylaxis per protocol.    --------------------------------------------  Case discussed with   Education given on findings and plan of care  ___________________________  DIANE Welch D.O.  Pager: [phone]

## 2024-02-23 NOTE — PROGRESS NOTE ADULT - PROBLEM SELECTOR PLAN 7
not in exacerbation   TTE limited views with no interval change.   rest of management as per primary team.

## 2024-02-23 NOTE — PROGRESS NOTE ADULT - SUBJECTIVE AND OBJECTIVE BOX
INTERVAL HPI/OVERNIGHT EVENTS:       PRESSORS: [ ] YES [ ] NO  WHICH:    ANTIBIOTICS:                  DATE STARTED:  ANTIBIOTICS:                  DATE STARTED:    Antimicrobial:  meropenem  IVPB 1000 milliGRAM(s) IV Intermittent every 8 hours    Cardiovascular:    Pulmonary:    Hematalogic:    Other:  acetaminophen     Tablet .. 650 milliGRAM(s) Oral every 6 hours PRN  levothyroxine 125 MICROGram(s) Oral daily  magnesium sulfate  IVPB 2 Gram(s) IV Intermittent once  nystatin Powder 1 Application(s) Topical two times a day  pantoprazole  Injectable 40 milliGRAM(s) IV Push two times a day  potassium phosphate / sodium phosphate Powder (PHOS-NaK) 1 Packet(s) Oral every 4 hours    acetaminophen     Tablet .. 650 milliGRAM(s) Oral every 6 hours PRN  levothyroxine 125 MICROGram(s) Oral daily  magnesium sulfate  IVPB 2 Gram(s) IV Intermittent once  meropenem  IVPB 1000 milliGRAM(s) IV Intermittent every 8 hours  nystatin Powder 1 Application(s) Topical two times a day  pantoprazole  Injectable 40 milliGRAM(s) IV Push two times a day  potassium phosphate / sodium phosphate Powder (PHOS-NaK) 1 Packet(s) Oral every 4 hours    Drug Dosing Weight  Height (cm): 152.9 (22 Feb 2024 11:44)  Weight (kg): 104 (20 Feb 2024 10:36)  BMI (kg/m2): 44.5 (22 Feb 2024 11:44)  BSA (m2): 1.98 (22 Feb 2024 11:44)    PHYSICAL EXAM:  GENERAL: NAD  EYES: EOMI, PERRLA  NECK: Supple, No JVD; Normal thyroid; Trachea midline: No LAD   NERVOUS SYSTEM:  Alert & Oriented X3,  Motor Strength 5/5 B/L upper and lower extremities; DTRs 2+ intact and symmetric  CHEST/LUNG: No rales, rhonchi, wheezing, breath sounds present bilaterally  HEART: Regular rate and rhythm; No murmurs, no gallops  ABDOMEN: Soft, Nontender, Nondistended; Bowel sounds present, no pain or masses on palpation  : voiding well, [place] in place  EXTREMITIES:  2+ Peripheral Pulses, No clubbing, cyanosis, or edema  SKIN: warm, intact, no lesions     LINES/DRAINS/DEVICES  CENTRAL LINE: [ ] YES [ ] NO  LOCATION:     PINZON: [ ] YES [ ] NO     A-LINE:  [ ] YES [ ] NO  LOCATION:       ICU Vital Signs Last 24 Hrs  T(C): 37 (23 Feb 2024 07:00), Max: 37.9 (22 Feb 2024 12:15)  T(F): 98.6 (23 Feb 2024 07:00), Max: 100.2 (22 Feb 2024 12:15)  HR: 80 (23 Feb 2024 07:00) (75 - 103)  BP: 130/56 (23 Feb 2024 07:00) (86/41 - 184/162)  BP(mean): 75 (23 Feb 2024 07:00) (22 - 175)  ABP: --  ABP(mean): --  RR: 17 (23 Feb 2024 07:00) (10 - 30)  SpO2: 99% (23 Feb 2024 07:00) (91% - 100%)    O2 Parameters below as of 23 Feb 2024 06:00  Patient On (Oxygen Delivery Method): nasal cannula  O2 Flow (L/min): 3                02-22 @ 07:01  -  02-23 @ 07:00  --------------------------------------------------------  IN: 50 mL / OUT: 800 mL / NET: -750 mL              LABS:  CBC Full  -  ( 23 Feb 2024 05:39 )  WBC Count : 14.22 K/uL  RBC Count : 2.22 M/uL  Hemoglobin : 6.9 g/dL  Hematocrit : 21.4 %  Platelet Count - Automated : 213 K/uL  Mean Cell Volume : 96.4 fl  Mean Cell Hemoglobin : 31.1 pg  Mean Cell Hemoglobin Concentration : 32.2 gm/dL  Auto Neutrophil # : 10.81 K/uL  Auto Lymphocyte # : 2.01 K/uL  Auto Monocyte # : 0.96 K/uL  Auto Eosinophil # : 0.14 K/uL  Auto Basophil # : 0.03 K/uL  Auto Neutrophil % : 76.0 %  Auto Lymphocyte % : 14.1 %  Auto Monocyte % : 6.8 %  Auto Eosinophil % : 1.0 %  Auto Basophil % : 0.2 %    02-23    140  |  109<H>  |  17  ----------------------------<  92  3.6   |  27  |  0.62    Ca    8.4      23 Feb 2024 05:39  Phos  1.9     02-23  Mg     1.5     02-23    TPro  5.0<L>  /  Alb  1.6<L>  /  TBili  0.6  /  DBili  x   /  AST  18  /  ALT  23  /  AlkPhos  90  02-23      Urinalysis Basic - ( 23 Feb 2024 05:39 )    Color: x / Appearance: x / SG: x / pH: x  Gluc: 92 mg/dL / Ketone: x  / Bili: x / Urobili: x   Blood: x / Protein: x / Nitrite: x   Leuk Esterase: x / RBC: x / WBC x   Sq Epi: x / Non Sq Epi: x / Bacteria: x      Culture Results:   >100,000 CFU/ml Enterococcus faecalis  >100,000 CFU/ml Enterococcus faecium (02-20 @ 14:25)  Culture Results:   No growth at 48 Hours (02-20 @ 11:00)  Culture Results:   No growth at 48 Hours (02-20 @ 10:55)      RADIOLOGY & ADDITIONAL STUDIES REVIEWED DURING TEAM ROUNDS    [ ]GOALS OF CARE DISCUSSION WITH PATIENT/FAMILY/PROXY:    CRITICAL CARE TIME SPENT: 35 minutes   INTERVAL HPI/OVERNIGHT EVENTS: Patient pulled out LUE ex dwell overnight. Patient continued to refuse IV line access and wanted to try again later in the morning. Patient's Hb 6.7, active Tn S ordered , L UE peripheral IV placed.   This morning, patients  ( Mr. Whittaker ) was updated bedside.   All questions and concerns addressed regarding patients current clinical status , treatment course. Consent obtained for blood transfusion   Informed regarding patient's GOC discussion as per palliative team and Dr. Harris ( 2/22/24- see full palliative GOC note for further details)   conformed that he is patient's HCP and that he agrees with patient's wishes. Endorses that patient's mental status has been " on and off these last couple of days during this hospital- ICU  admission, hasn't been all here when I saw her day before yesterday, but I want her to be comfortable and get what she wants"       PRESSORS: [ ] YES [ ] NO  WHICH:    ANTIBIOTICS:                  DATE STARTED:  ANTIBIOTICS:                  DATE STARTED:    Antimicrobial:  meropenem  IVPB 1000 milliGRAM(s) IV Intermittent every 8 hours    Cardiovascular:    Pulmonary:    Hematalogic:    Other:  acetaminophen     Tablet .. 650 milliGRAM(s) Oral every 6 hours PRN  levothyroxine 125 MICROGram(s) Oral daily  magnesium sulfate  IVPB 2 Gram(s) IV Intermittent once  nystatin Powder 1 Application(s) Topical two times a day  pantoprazole  Injectable 40 milliGRAM(s) IV Push two times a day  potassium phosphate / sodium phosphate Powder (PHOS-NaK) 1 Packet(s) Oral every 4 hours    acetaminophen     Tablet .. 650 milliGRAM(s) Oral every 6 hours PRN  levothyroxine 125 MICROGram(s) Oral daily  magnesium sulfate  IVPB 2 Gram(s) IV Intermittent once  meropenem  IVPB 1000 milliGRAM(s) IV Intermittent every 8 hours  nystatin Powder 1 Application(s) Topical two times a day  pantoprazole  Injectable 40 milliGRAM(s) IV Push two times a day  potassium phosphate / sodium phosphate Powder (PHOS-NaK) 1 Packet(s) Oral every 4 hours    Drug Dosing Weight  Height (cm): 152.9 (22 Feb 2024 11:44)  Weight (kg): 104 (20 Feb 2024 10:36)  BMI (kg/m2): 44.5 (22 Feb 2024 11:44)  BSA (m2): 1.98 (22 Feb 2024 11:44)    PHYSICAL EXAM:  GENERAL: NAD, obese habitus, chronic UE tremors  EYES: EOMI, PERRLA  NECK: Supple, No JVD; Normal thyroid; Trachea midline: No LAD   NERVOUS SYSTEM:  Alert & Oriented X2-3,forgetful, follows commands    CHEST/LUNG: No rales, rhonchi, wheezing, breath sounds present bilaterally, saturating well on 3 L NC  HEART: Regular rate and rhythm; No murmurs, no gallops  ABDOMEN: Soft, Nontender, Nondistended; Bowel sounds present, no pain or masses on palpation  : voiding well, primafit in place  EXTREMITIES:  2+ Peripheral Pulses, No clubbing, cyanosis, + non pitting BL pedal lymphedema, + anterior R leg wound - 4x4 cm with surrounding erythema , local warmth - NON tender- dressing intact  SKIN:ecchymoses, swelling and erythema over R UE- mildly tender to palpation .       LINES/DRAINS/DEVICES  CENTRAL LINE: [ ] YES [ ] NO  LOCATION:     PINZON: [ ] YES [ ] NO     A-LINE:  [ ] YES [ ] NO  LOCATION:       ICU Vital Signs Last 24 Hrs  T(C): 37 (23 Feb 2024 07:00), Max: 37.9 (22 Feb 2024 12:15)  T(F): 98.6 (23 Feb 2024 07:00), Max: 100.2 (22 Feb 2024 12:15)  HR: 80 (23 Feb 2024 07:00) (75 - 103)  BP: 130/56 (23 Feb 2024 07:00) (86/41 - 184/162)  BP(mean): 75 (23 Feb 2024 07:00) (22 - 175)  ABP: --  ABP(mean): --  RR: 17 (23 Feb 2024 07:00) (10 - 30)  SpO2: 99% (23 Feb 2024 07:00) (91% - 100%)    O2 Parameters below as of 23 Feb 2024 06:00  Patient On (Oxygen Delivery Method): nasal cannula  O2 Flow (L/min): 3                02-22 @ 07:01  -  02-23 @ 07:00  --------------------------------------------------------  IN: 50 mL / OUT: 800 mL / NET: -750 mL              LABS:  CBC Full  -  ( 23 Feb 2024 05:39 )  WBC Count : 14.22 K/uL  RBC Count : 2.22 M/uL  Hemoglobin : 6.9 g/dL  Hematocrit : 21.4 %  Platelet Count - Automated : 213 K/uL  Mean Cell Volume : 96.4 fl  Mean Cell Hemoglobin : 31.1 pg  Mean Cell Hemoglobin Concentration : 32.2 gm/dL  Auto Neutrophil # : 10.81 K/uL  Auto Lymphocyte # : 2.01 K/uL  Auto Monocyte # : 0.96 K/uL  Auto Eosinophil # : 0.14 K/uL  Auto Basophil # : 0.03 K/uL  Auto Neutrophil % : 76.0 %  Auto Lymphocyte % : 14.1 %  Auto Monocyte % : 6.8 %  Auto Eosinophil % : 1.0 %  Auto Basophil % : 0.2 %    02-23    140  |  109<H>  |  17  ----------------------------<  92  3.6   |  27  |  0.62    Ca    8.4      23 Feb 2024 05:39  Phos  1.9     02-23  Mg     1.5     02-23    TPro  5.0<L>  /  Alb  1.6<L>  /  TBili  0.6  /  DBili  x   /  AST  18  /  ALT  23  /  AlkPhos  90  02-23      Urinalysis Basic - ( 23 Feb 2024 05:39 )    Color: x / Appearance: x / SG: x / pH: x  Gluc: 92 mg/dL / Ketone: x  / Bili: x / Urobili: x   Blood: x / Protein: x / Nitrite: x   Leuk Esterase: x / RBC: x / WBC x   Sq Epi: x / Non Sq Epi: x / Bacteria: x      Culture Results:   >100,000 CFU/ml Enterococcus faecalis  >100,000 CFU/ml Enterococcus faecium (02-20 @ 14:25)  Culture Results:   No growth at 48 Hours (02-20 @ 11:00)  Culture Results:   No growth at 48 Hours (02-20 @ 10:55)      RADIOLOGY & ADDITIONAL STUDIES REVIEWED DURING TEAM ROUNDS    [ ]GOALS OF CARE DISCUSSION WITH PATIENT/FAMILY/PROXY:    CRITICAL CARE TIME SPENT: 35 minutes

## 2024-02-23 NOTE — PROGRESS NOTE ADULT - SUBJECTIVE AND OBJECTIVE BOX
follow up on:  complex medical decision making related to goals of care    [facility] Geriatric and Palliative Consult Service:  Alicia Haile DO: cell ([phone])  Hemant Hagan MD: cell ([phone])  Syed Leon NP: cell ([phone])   Rodney Garcia LMSW: cell ([phone])   Luisana Phelan NP: via [employer] Teams    Can contact any Palliative Team member via [employer] Teams for consults and questions    OVERNIGHT EVENTS:        REVIEW OF SYSTEMS: [All others negative]  Present Symptoms: Moderate  Pain: Resolved           Fatigue: none  Nausea: none  Lack of Appetite: denies   SOB: denies   Depression: none  Anxiety: none   Constipation: no BM    CPOT:  0  https://ccs-st.org/resources/Documents/Sedation%20Analgesia%20Delirium%20in%20CC/CCS%20STH%20Guideline%20template%20CPOT.pdf  PAIN AD Score: 0  [email or web address] (press ctrl +  left click to view)      MEDICATIONS  (STANDING):  levothyroxine 125 MICROGram(s) Oral daily  meropenem  IVPB 1000 milliGRAM(s) IV Intermittent every 8 hours  nystatin Powder 1 Application(s) Topical two times a day  potassium phosphate / sodium phosphate Powder (PHOS-NaK) 1 Packet(s) Oral every 4 hours    MEDICATIONS  (PRN):  acetaminophen     Tablet .. 650 milliGRAM(s) Oral every 6 hours PRN Temp greater or equal to 38C (100.4F), Mild Pain (1 - 3)      PHYSICAL EXAM:  Vital Signs Last 24 Hrs  T(C): 37 (23 Feb 2024 09:00), Max: 37.9 (22 Feb 2024 12:15)  T(F): 98.6 (23 Feb 2024 09:00), Max: 100.2 (22 Feb 2024 12:15)  HR: 73 (23 Feb 2024 09:00) (73 - 103)  BP: 142/50 (23 Feb 2024 09:00) (90/80 - 184/162)  BP(mean): 74 (23 Feb 2024 09:00) (23 - 171)  RR: 18 (23 Feb 2024 09:00) (10 - 30)  SpO2: 100% (23 Feb 2024 09:00) (91% - 100%)    Parameters below as of 23 Feb 2024 07:00  Patient On (Oxygen Delivery Method): nasal cannula  O2 Flow (L/min): 3        General: alert  oriented x 3, with poor insight and judgement; dishevelled     Palliative Performance Scale/Karnofsky Score: 30%  http://npcrc.org/files/news/palliative_performance_scale_ppsv2.pdf    HEENT: (+) nasal contusion with abrasion with bandage  Lungs: not in distress, poor inspiratory effort limited by body habitus  CV: RRR, S1S2, tachycardia  GI: soft non distended non tender  incontinent  : incontinent primafit with good output  Musculoskeletal: weakness x4 edema x4; mostly/bedbound and fully wheelchair bound  Skin: (+) multiple contusions diffuse at various healing stages   Neuro: no deficits, mild cognitive impairment; AOx3 but unable to recall events of refusing treatments  Oral intake ability: moderate capability        LABS:                          6.7    14.26 )-----------( 205      ( 23 Feb 2024 09:14 )             21.7     02-23    140  |  109<H>  |  17  ----------------------------<  92  3.6   |  27  |  0.62    Ca    8.4      23 Feb 2024 05:39  Phos  1.9     02-23  Mg     1.5     02-23    TPro  5.0<L>  /  Alb  1.6<L>  /  TBili  0.6  /  DBili  x   /  AST  18  /  ALT  23  /  AlkPhos  90  02-23    Urinalysis Basic - ( 23 Feb 2024 05:39 )    Color: x / Appearance: x / SG: x / pH: x  Gluc: 92 mg/dL / Ketone: x  / Bili: x / Urobili: x   Blood: x / Protein: x / Nitrite: x   Leuk Esterase: x / RBC: x / WBC x   Sq Epi: x / Non Sq Epi: x / Bacteria: x        RADIOLOGY & ADDITIONAL STUDIES: Reviewed follow up on:  complex medical decision making related to goals of care    [facility] Geriatric and Palliative Consult Service:  Alicia Haile DO: cell ([phone])  Hemant Hagan MD: cell ([phone])  Syed Leon NP: cell ([phone])   Rodney Garcia LMSW: cell ([phone])   Luisana Phelan NP: via [employer]    Can contact any Palliative Team member via [employer] Teams for consults and questions    OVERNIGHT EVENTS:  Overnight patient pulled out extended dwell and refused IV access differing for IV in the AM, which delayed IV med administration. Patient weaned off pressors. Patient has no recollection of prior overnight events. Primary team spoke to patient's  this morning who re-affirmed GOC as discussed yesterday. Labs notable today for refractory anemia requiring second unit pRBC and worsening electrolytes but with noted improvement of renal function now at baseline.         REVIEW OF SYSTEMS: [All others negative]  Present Symptoms: Moderate  Pain: Resolved           Fatigue: none  Nausea: none  Lack of Appetite: denies   SOB: denies   Depression: none  Anxiety: none   Constipation: no BM    CPOT:  0  https://ccs-sth.org/resources/Documents/Sedation%20Analgesia%20Delirium%20in%20CC/CCS%20STH%20Guideline%20template%20CPOT.pdf  PAIN AD Score: 0  [email or web address] (press ctrl +  left click to view)      MEDICATIONS  (STANDING):  levothyroxine 125 MICROGram(s) Oral daily  meropenem  IVPB 1000 milliGRAM(s) IV Intermittent every 8 hours  nystatin Powder 1 Application(s) Topical two times a day  potassium phosphate / sodium phosphate Powder (PHOS-NaK) 1 Packet(s) Oral every 4 hours    MEDICATIONS  (PRN):  acetaminophen     Tablet .. 650 milliGRAM(s) Oral every 6 hours PRN Temp greater or equal to 38C (100.4F), Mild Pain (1 - 3)      PHYSICAL EXAM:  Vital Signs Last 24 Hrs  T(C): 37 (23 Feb 2024 09:00), Max: 37.9 (22 Feb 2024 12:15)  T(F): 98.6 (23 Feb 2024 09:00), Max: 100.2 (22 Feb 2024 12:15)  HR: 73 (23 Feb 2024 09:00) (73 - 103)  BP: 142/50 (23 Feb 2024 09:00) (90/80 - 184/162)  BP(mean): 74 (23 Feb 2024 09:00) (23 - 171)  RR: 18 (23 Feb 2024 09:00) (10 - 30)  SpO2: 100% (23 Feb 2024 09:00) (91% - 100%)    Parameters below as of 23 Feb 2024 07:00  Patient On (Oxygen Delivery Method): nasal cannula  O2 Flow (L/min): 3        General: alert  oriented x 3, with poor insight and judgement; dishevelled     Palliative Performance Scale/Karnofsky Score: 30%  http://[facility].org/files/news/palliative_performance_scale_[email or web address].pdf    HEENT: (+) nasal contusion with abrasion with bandage  Lungs: not in distress, poor inspiratory effort limited by body habitus  CV: RRR, S1S2  GI: soft non distended non tender  incontinent  : incontinent primafit with good output  Musculoskeletal: weakness x4 edema x4; mostly/bedbound and fully wheelchair bound  Skin: (+) multiple contusions diffuse at various healing stages   Neuro: no deficits, mild cognitive impairment; AOx3 but unable to recall events of refusing treatments  Oral intake ability: moderate capability        LABS:                          6.7    14.26 )-----------( 205      ( 23 Feb 2024 09:14 )             21.7     02-23    140  |  109<H>  |  17  ----------------------------<  92  3.6   |  27  |  0.62    Ca    8.4      23 Feb 2024 05:39  Phos  1.9     02-23  Mg     1.5     02-23    TPro  5.0<L>  /  Alb  1.6<L>  /  TBili  0.6  /  DBili  x   /  AST  18  /  ALT  23  /  AlkPhos  90  02-23    Urinalysis Basic - ( 23 Feb 2024 05:39 )    Color: x / Appearance: x / SG: x / pH: x  Gluc: 92 mg/dL / Ketone: x  / Bili: x / Urobili: x   Blood: x / Protein: x / Nitrite: x   Leuk Esterase: x / RBC: x / WBC x   Sq Epi: x / Non Sq Epi: x / Bacteria: x        RADIOLOGY & ADDITIONAL STUDIES: Reviewed

## 2024-02-23 NOTE — PROGRESS NOTE ADULT - ATTENDING COMMENTS
73 year old female AAOX3, from home, wheelchair-bound and uses cane occasionally , w/ PMHx COPD on 3 L NC at home, Obesity, Hypothyroidism, Diastolic HF on Lasix, Breast cancer s/p surgery, Asthma, Chronic lymphedema, Kidney cancer s/p partial nephrectomy discharged from [facility] on 2/16/24 for chf exacerbation and new Afib with RVR discharged on Eliquis presenting to ED after a fall at home earlier today admitted for hypotension likely septic vs hypovolemic shock.    Exam with multiple cutaneous hematomas. Chronic pedal edema. States has been bruising for a while now. Was started on anticoagulation recently for new onset afib. Labs significant for ALDAIR, lactic acidosis, worsening anemia.     CT scans performed in the ED suggestive of multiple acute and chronic rib fractures. Cutaneous hematomas. UA noted to be positive, concerning for UTI.     Post volume resuscitation assessment performed, now on vasopressor support     Assessment:  1. Septic Shock  2. Lactic acidosis   3. Acute kidney injury likely ATN   4. UTI  5. Acute on chronic anemia   6. Multiple hematomas   7. Recurrent falls  8. Heart failure with preserved EF   9. COPD   10. Chronic afib     Plan:  - PS/p 1 unit PRBC  - Hgb stable post transfusion  - S/p IV fluid resuscitation  - Hold diuretics and antihypertensives  - Hemodynamic monitoring  - Titrate down vasopressor as tolerated   - Repeat ECHO   - Hold anticoagulation  - Trend hgb  - F/u cultures  - Broad spectrum antibiotics  - ID consulted   - Cont. home COPD medications  - Podiatry and wound care consult  - Cont. ICU care
73 year old female AAOX3, from home, wheelchair-bound and uses cane occasionally , w/ PMHx COPD on 3 L NC at home, Obesity, Hypothyroidism, Diastolic HF on Lasix, Breast cancer s/p surgery, Asthma, Chronic lymphedema, Kidney cancer s/p partial nephrectomy discharged from [facility] on 2/16/24 for chf exacerbation and new Afib with RVR discharged on Eliquis presenting to ED after a fall at home earlier today admitted for hypotension likely septic vs hypovolemic shock.    Exam with multiple cutaneous hematomas. Chronic pedal edema. States has been bruising for a while now. Was started on anticoagulation recently for new onset afib. Labs significant for ALDAIR, lactic acidosis, worsening anemia.     CT scans performed in the ED suggestive of multiple acute and chronic rib fractures. Cutaneous hematomas. UA noted to be positive, concerning for UTI.     Post volume resuscitation assessment performed, now on vasopressor support     Assessment:  1. Septic Shock  2. Lactic acidosis   3. Acute kidney injury likely ATN   4. UTI  5. Acute on chronic anemia   6. Multiple hematomas   7. Recurrent falls  8. Heart failure with preserved EF   9. COPD   10. Chronic afib     Plan:  - Refusing therapies intermittently, did not allow for central line placement, refusing blood pressure measurements  - New US guided line placed for   - PS/p 1 unit PRBC  - Hgb stable post transfusion  - S/p IV fluid resuscitation  - Hold diuretics and antihypertensives  - Hemodynamic monitoring  - Titrate down vasopressor as tolerated   - Hold anticoagulation  - Trend hgb  - F/u cultures  - Broad spectrum antibiotics  - Urine culture + awaiting speciation  - ID consulted   - Cont. home COPD medications  - Podiatry and wound care consult  - Palliative care consult given multiple hospitalizations, refusing therapies   - Cont. ICU care
73 year old female AAOX3, from home, wheelchair-bound and uses cane occasionally , w/ PMHx COPD on 3 L NC at home, Obesity, Hypothyroidism, Diastolic HF on Lasix, Breast cancer s/p surgery, Asthma, Chronic lymphedema, Kidney cancer s/p partial nephrectomy discharged from [facility] on 2/16/24 for chf exacerbation and new Afib with RVR discharged on Eliquis presenting to ED after a fall at home earlier today admitted for hypotension likely septic vs hypovolemic shock.    Exam with multiple cutaneous hematomas. Chronic pedal edema. States has been bruising for a while now. Was started on anticoagulation recently for new onset afib. Labs significant for ALDAIR, lactic acidosis, worsening anemia.     CT scans performed in the ED suggestive of multiple acute and chronic rib fractures. Cutaneous hematomas. UA noted to be positive, concerning for UTI.     Post volume resuscitation assessment performed, now on vasopressor support     Assessment:  1. Septic Shock  2. Lactic acidosis   3. Acute kidney injury likely ATN   4. UTI  5. Acute on chronic anemia   6. Multiple hematomas   7. Recurrent falls  8. Heart failure with preserved EF   9. COPD   10. Chronic afib     Plan:  - Refusing therapies intermittently, did not allow for central line placement, refusing blood pressure measurements  - New US guided line placed this morning    - Transfuse 1 unit PRBC, no obvious bleeding noted   - Hold all anticoagulation for now   - S/p IV fluid resuscitation  - Hold diuretics and antihypertensives  - Hemodynamic monitoring  - Off vasopressors  - Hold anticoagulation  - Trend hgb  - F/u cultures  - Antibiotics per ID  - Cont. home COPD medications  - Podiatry and wound care consult  - Palliative care consult given multiple hospitalizations, refusing therapies   - DNR/DNI code status  - Transfer out of ICU

## 2024-02-23 NOTE — PROGRESS NOTE ADULT - PROBLEM SELECTOR PLAN 1
likely d/t UTI (+) enterococcus faecalis and faecium vs RLE cellulitis  c/w empiric abx as per primary team   c/w pressor support as indicated  patient amenable to central line discussion    If invasive interventions and treatment is needed patient agreeable to comply, as long as it is explained to her the risks and benefits and how it will be done. now with recurrent anemia, baseline 12-13 prior to Eliquis initiation for new afib  refractory to blood transfusions s/p 1u pRBC 2/20  no s/s of active bleed on exam, no clear source  getting 2nd unit today 2/23  off AC  rest of w/u as per primary team

## 2024-02-23 NOTE — PROGRESS NOTE ADULT - PROBLEM SELECTOR PLAN 11
h/o multitude of co-morbidities with significant disease burden and recurrent hospitalizations  Decrease in functional status now with limited mobility  PPS 30%, hospice appropriate but not ready to transition care    GOC: DNR/ DNI with limited medical management with clear explanations provided for indicated procedures   GOC discussion as above. MOLST signed, copy in chart   HCP updated and documented, copy in chart.    Patient is at high risk for readmission and mortality, joanne benefit from home visiting program.  Palliative SW following

## 2024-02-23 NOTE — PROGRESS NOTE ADULT - ASSESSMENT
ASSESSMENT    HPI: 73 year old female AAOX3, from home, wheelchair-bound and uses cane occasionally , w/ PMHx COPD on 3 L NC at home, Obesity, Hypothyroidism, Diastolic HF on Lasix, Breast cancer s/p surgery, Asthma, Chronic lymphedema, Kidney cancer s/p partial nephrectomy discharged from [facility] on 2/16/24 for chf exacerbation and new Afib with RVR discharged on Eliquis presenting to ED after a fall at home earlier today admitted for hypotension likely septic vs hypovolemic shock 2/2 UTI     _________CNS___________  #Pain  -Acute rib fractures  -Tylenol PRN   # AMS likely 2/2 delirium due to acute illness / ICU delirium   - Patient AO x 2-3 and  confused, delirious  overnight  , baseline is AO x 3     _________CVS___________  #Septic Shock vs Hypovolemic shock?   -UA +  -Hb 7, baseline around 12 in beginning of february 2024   - s/p 1 U PRBC   -s/p total 2.5 L NS , s/p levophed    -Prelim U Cx - e. fecalis/ e . faecium   - f/u cultures sensitivity     #CHF   -bnp 10 K   TTE as below   -hold home meds while NPO incase of urgent HD     #AFib with RVR  -hold all anticoagulation     # concern for pericardial effusion on POCUS  TTE - grossly N LV systolic function  LVOT- dynamic  G1 diastolic dysfunction    _________RESP__________  #COPD  -on 3LNC at home, satting well in home O2 dose  -no acute issues    ___________GI____________  #GI bleed?  - currently no signs of GIB  -pt denies any blood in stool or any bloody vomit  -drop in Hb possibly GI  - Hb 7.3 > 7> 6.9> 6.7> 1 PRBC  f/u post transfusion PRBC   - resume diet   -IV Protonix BID> OD   - c/w monitoring for symptoms   - will hold PPx lovenox       ________ RENAL__________  #ALDAIR  -likely prerenal, resolving   -IVF   FC> primafit     __________MSK___________  #Fall  -PT consulted - JUDITH      ___________ID____________  #Septic shock?  -source UTI, Hx of ESBL UTI in 1/24   -abx cefepime> meropenem   - c/w meropenem   - UCx e. fecalis/ faecium   - f/u Culture sensitivity   -blood cx neg  INFx Dr Renetta Arreaga following     _________ENDO__________  #hypothyroidism  -TSH- TSH 9.99   -c/w synthroid IV     ______HEME/ONC_______  #Anemia  -Hb 7, baseline 12.5 around begining of february however Hb trended down prior to discharge a few days ago ( was 8.6 day of d/c)   Hb 7.3 , no signs of overt bleed   Management as  above     _________SKIN____________  #RLE ulcer- dressing intact  -podiatry following   - f/u wound care recs  s/p 1 dose vanc in ED   -dressing change    #Sacral ulcer  -frequent positioning    # lines   Patient pulled out LUE ex dwell   - LUE peripheral IV placed 2/23    ________PROPHY_______  #DVT SCD ( increase SCD to 1 on L lower extremity )   #GI PPI      ______GOC/DISPO___________  DG to medicine    palliative following   DNR / DNI / trial NIV

## 2024-02-23 NOTE — PROGRESS NOTE ADULT - PROBLEM SELECTOR PLAN 4
presented after a traumatic mechanical fall   (+) nasal open contusion and multiple contusions and hematomas throughout the body  no CTH on file  CT c/a/p: multiple acute and chronic rib fractures at varying healing stages   wheelchair bound with rolling walker assist from bed to chair at baseline  deconditioned i/s/o recurrent admissions  PT recs JUDITH, of note patient noted to defer JUDITH multiple times opting for home PT with home care.

## 2024-02-23 NOTE — PROGRESS NOTE ADULT - SUBJECTIVE AND OBJECTIVE BOX
Interval: Patient was seen resting bedside in no acute distress. Denies any other pedal complaints at this time.     HPI: 73 year old female AAOX3, from home, wheelchair-bound and uses cane occasionally , w/ PMHx COPD on 3 L NC at home, Obesity, Hypothyroidism, Diastolic HF on Lasix, Breast cancer s/p surgery, Asthma, Chronic lymphedema, Kidney cancer s/p partial nephrectomy discharged from [facility] on 2/16/24 for chf exacerbation and new Afib with RVR discharged on Eliquis presenting to ED after a fall at home earlier today. Patient states that she was using her cane to ambulate before tripping and falling landing on her nose, resulting in a wound on her nose that started to bleed prompting her to ED. Patient denies losing consciousness or any preceding symptoms such as headaches, dizziness, and chest pain. Patient reports that she did not take her eliquis since leaving the hospital because she noticed many bruises around her body. She denies any fevers, chills, headaches, dizziness, chest pain, cough, SOB, abd pain, n/v, diarrhea, constipation, hematuria, dysuria, numbness, and weakness.   In ED VS: HR 74, BP 88/56, RR 22, SPo2 87% 4LNC, WBC 34k, Hb 7.9, Cr 2.64, Trop 137, Lactate 5.4, BNP 10k, UA +, Acute 11-12 Rib fractures Left  s/p 2g cefepime, 1 g vanc, 2L NS bolus, started on levophed     of note she states while transporting via EMS, she developed a wound to her right lower extremity with stabbing pain to the area.       Medications acetaminophen     Tablet .. 650 milliGRAM(s) Oral every 6 hours PRN  chlorhexidine 2% Cloths 1 Application(s) Topical <User Schedule>  levothyroxine 125 MICROGram(s) Oral daily  linezolid  IVPB      linezolid  IVPB 600 milliGRAM(s) IV Intermittent once  nystatin Powder 1 Application(s) Topical two times a day    FHFamily history of myocardial infarction    Family history of hypertension    Family history of diabetes mellitus (Sibling)    Family history of heart disease (Sibling)    Family history of thyroid cancer (Child)    ,   PMHHypertension    Hyperlipidemia    Anxiety    Graves disease    Kidney cancer, primary, with metastasis from kidney to other site, left    Breast cancer in situ, left    COPD (chronic obstructive pulmonary disease)    Hypothyroid    DVT (deep venous thrombosis)    Obesity    Sleep apnea    Asthma       PSHS/P cholecystectomy    S/p nephrectomy    S/P breast biopsy    History of pelvic surgery    History of eye surgery    History of carpal tunnel release    History of parathyroidectomy    S/P laminectomy        Labs                          6.7    14.26 )-----------( 205      ( 23 Feb 2024 09:14 )             21.7      02-23    140  |  109<H>  |  17  ----------------------------<  92  3.6   |  27  |  0.62    Ca    8.4      23 Feb 2024 05:39  Phos  1.9     02-23  Mg     1.5     02-23    TPro  5.0<L>  /  Alb  1.6<L>  /  TBili  0.6  /  DBili  x   /  AST  18  /  ALT  23  /  AlkPhos  90  02-23     Vital Signs Last 24 Hrs  T(C): 37.4 (23 Feb 2024 15:00), Max: 37.9 (22 Feb 2024 16:15)  T(F): 99.3 (23 Feb 2024 15:00), Max: 100.2 (22 Feb 2024 16:15)  HR: 89 (23 Feb 2024 15:00) (73 - 101)  BP: 131/72 (23 Feb 2024 15:00) (90/80 - 151/119)  BP(mean): 91 (23 Feb 2024 15:00) (51 - 127)  RR: 21 (23 Feb 2024 15:00) (10 - 24)  SpO2: 100% (23 Feb 2024 15:00) (97% - 100%)    Parameters below as of 23 Feb 2024 07:00  Patient On (Oxygen Delivery Method): nasal cannula  O2 Flow (L/min): 3           C-Reactive Protein, Serum: 24 mg/L (08-28-23 @ 07:01)   WBC Count: 14.26 K/uL *H* (02-23-24 @ 09:14)  WBC Count: 14.22 K/uL *H* (02-23-24 @ 05:39)  WBC Count: 19.17 K/uL *H* (02-22-24 @ 17:35)        ROS: Unremarkable outside HPI      LE Focused Exam  Vascular: DP 1/4 PT 1/4 b/l. CFT <3 secs x 10. Temp Gradient warm to cool b/l. Pedal hair absent. +non-pitting edema b/l  Dermatological: Partial flap, skin tear noted measuring 10x7x0.1cm, No drainage, surrounding erythema or clinical signs of infection.   Neurological: Patient is awake, alert and oriented x3. Gross Epicritic sensation is intact  MSK: + pain on palpation to wound.  Negative desmond sign b/l

## 2024-02-23 NOTE — CHART NOTE - NSCHARTNOTEFT_GEN_A_CORE
73F, wheelchair-bound and uses cane occasionally, w/ PMHx COPD on 3 L NC at home, Obesity, Hypothyroidism, Diastolic HF on Lasix, Breast cancer s/p surgery, Asthma, Chronic lymphedema, Kidney cancer s/p partial nephrectomy discharged from [facility] on 2/16/24 for CHF exacerbation and new Afib with RVR discharged on Eliquis presenting to ED after a fall at home  admitted to ICU for septic shock 2/2UTI, s/p IVF and levophed. Patient was started on meropenem given history of recent ESBL UTI and infectious disease Dr. Arreaga consulted. Urine culture preliminary repot- E. fecalis and E. Faecium. ICU course complicated by low Hb, trended down to 6.7 on 2/23/24, 1 U PRBC ordered. Palliative care consulted and GOC as per 2/22/24 - DNR/DNI trial of NIV    Patient is now stable to be downgraded to medicine floor 410 B   signed out to   Attending - Dr. Linder   Resident NP -     Primary team to follow -     [ ] Patient has PRBC irdered for today   [ ] f/u post transfusion Hb   [ ] f/ Cx Sn 73F, wheelchair-bound and uses cane occasionally, w/ PMHx COPD on 3 L NC at home, Obesity, Hypothyroidism, Diastolic HF on Lasix, Breast cancer s/p surgery, Asthma, Chronic lymphedema, Kidney cancer s/p partial nephrectomy discharged from [facility] on 2/16/24 for CHF exacerbation and new Afib with RVR discharged on Eliquis presenting to ED after a fall at home  admitted to ICU for septic shock 2/2UTI, s/p IVF and levophed. Patient was started on meropenem given history of recent ESBL UTI and infectious disease Dr. Arreaga consulted. Urine culture preliminary repot- E. fecalis and E. Faecium. ICU course complicated by low Hb, trended down to 6.7 on 2/23/24, 1 U PRBC ordered. Palliative care consulted and GOC as per 2/22/24 - DNR/DNI trial of NIV    Patient is now stable to be downgraded to medicine floor 410 B   signed out to   Attending - Dr. Linder   Resident NP -     Primary team to follow -     [ ] Patient has PRBC ordered for today   [ ] f/u post transfusion Hb   [ ] f/u urine Cx sensitivity 73F, wheelchair-bound and uses cane occasionally, w/ PMHx COPD on 3 L NC at home, Obesity, Hypothyroidism, Diastolic HF on Lasix, Breast cancer s/p surgery, Asthma, Chronic lymphedema, Kidney cancer s/p partial nephrectomy discharged from [facility] on 2/16/24 for CHF exacerbation and new Afib with RVR discharged on Eliquis presenting to ED after a fall at home  admitted to ICU for septic shock 2/2UTI, s/p IVF and levophed. Patient was started on meropenem given history of recent ESBL UTI and infectious disease Dr. Arreaga consulted. Urine culture preliminary report- E. fecalis and E. Faecium. ICU course complicated by low Hb, trended down to 6.7 on 2/23/24, 1 U PRBC ordered. Palliative care consulted and GOC as per 2/22/24 - DNR/DNI trial of NIV    Patient is now stable to be downgraded to medicine floor 410 B   signed out to   Attending - Dr. Linder   Resident NP -     Primary team to follow -       [ ] s/p 1 PRBC f/u post transfusion Hb 73F, wheelchair-bound and uses cane occasionally, w/ PMHx COPD on 3 L NC at home, Obesity, Hypothyroidism, Diastolic HF on Lasix, Breast cancer s/p surgery, Asthma, Chronic lymphedema, Kidney cancer s/p partial nephrectomy discharged from [facility] on 2/16/24 for CHF exacerbation and new Afib with RVR discharged on Eliquis presenting to ED after a fall at home  admitted to ICU for septic shock 2/2UTI, s/p IVF and levophed. Patient was started on meropenem given history of recent ESBL UTI and infectious disease Dr. Arreaga consulted. Urine culture preliminary report- E. fecalis and E. Faecium. ICU course complicated by low Hb, trended down to 6.7 on 2/23/24, 1 U PRBC ordered. Palliative care consulted and GOC as per 2/22/24 - DNR/DNI trial of NIV. Culture sensitivity - VRE, patient started on Linezolid IV.     Patient is now stable to be downgraded to medicine floor 410 B   signed out to   Attending - Dr. Linder   Resident/NP -   Infectious disease - Dr. Arreaga following     Primary team to follow -       [ ] s/p 1 PRBC f/u post transfusion Hb  [ ] DO NOT give home medication SSRI as patient si on Linezolid   [ ] Monitor for serotonin syndrome 73F, wheelchair-bound and uses cane occasionally, w/ PMHx COPD on 3 L NC at home, Obesity, Hypothyroidism, Diastolic HF on Lasix, Breast cancer s/p surgery, Asthma, Chronic lymphedema, Kidney cancer s/p partial nephrectomy discharged from [facility] on 2/16/24 for CHF exacerbation and new Afib with RVR discharged on Eliquis presenting to ED after a fall at home  admitted to ICU for septic shock 2/2UTI, s/p IVF and levophed. Patient was started on meropenem given history of recent ESBL UTI and infectious disease Dr. Arreaga consulted. Urine culture preliminary report- E. fecalis and E. Faecium. ICU course complicated by low Hb, trended down to 6.7 on 2/23/24, 1 U PRBC ordered. Palliative care consulted and GOC as per 2/22/24 - DNR/DNI trial of NIV. Culture sensitivity - VRE, patient started on Linezolid IV.     Patient is now stable to be downgraded to medicine floor 410 B   signed out to   Attending - Dr. Linder   Resident/NP - Dr. Elkins   Infectious disease - Dr. Arreaga following     Primary team to follow -       [ ] s/p 1 PRBC f/u post transfusion Hb at 4 PM, transfuse for Hb < 7 , maintain active type and screen   [ ] DO NOT give home medication SSRI as patient is on Linezolid   [ ] Monitor for serotonin syndrome 73F, wheelchair-bound and uses cane occasionally, w/ PMHx COPD on 3 L NC at home, Obesity, Hypothyroidism, Diastolic HF on Lasix, Breast cancer s/p surgery, Asthma, Chronic lymphedema, Kidney cancer s/p partial nephrectomy discharged from [facility] on 2/16/24 for CHF exacerbation and new Afib with RVR discharged on Eliquis presenting to ED after a fall at home  admitted to ICU for septic shock 2/2UTI, s/p IVF and levophed. Patient was started on meropenem given history of recent ESBL UTI and infectious disease Dr. Arreaga consulted. Urine culture preliminary report- E. fecalis and E. Faecium. ICU course complicated by low Hb, trended down to 6.7 on 2/23/24, 1 U PRBC ordered. Palliative care consulted and GOC as per 2/22/24 - DNR/DNI trial of NIV. Culture sensitivity - VRE, patient started on Linezolid IV.     Patient is now stable to be downgraded to  B   signed out to   Attending - Dr. Linder   Resident/NP - Jud Zamorano   Infectious disease - Dr. Arreaga following     Primary team to follow -       [ ] s/p 1 PRBC f/u post transfusion Hb at 4 PM, transfuse for Hb < 7 , maintain active type and screen   [ ] DO NOT give home medication SSRI as patient is on Linezolid   [ ] Monitor for serotonin syndrome

## 2024-02-23 NOTE — PROGRESS NOTE ADULT - PROBLEM SELECTOR PLAN 3
likely d/t acute infection c/b hypoperfusion i/s/o septic and hypovolemic shock  baseline SCr 0.54, with continued improvement  rest of management as per primary team. likely d/t UTI (+) enterococcus faecalis and faecium vs RLE cellulitis  c/w empiric abx as per primary team   off pressors  patient amenable to central line discussion    If invasive interventions and treatment is needed patient agreeable to comply, as long as it is explained to her the risks and benefits and how it will be done.

## 2024-02-23 NOTE — PROGRESS NOTE ADULT - PROBLEM SELECTOR PLAN 2
multifactorial COPD 2-3L at baseline, diastolic CHF, morbid obesity, deconditioning exacerbated by septic shock   not in respiratory distress  c/w supp O2 now at baseline  rest of management as per primary team.

## 2024-02-23 NOTE — PROGRESS NOTE ADULT - ASSESSMENT
A:  Right lower leg skin tear, type 2    P:  Pt evaluated and chart reviewed  Vitals reviewed, + leukocytosis, unlikely RLE as source of leukocytosis  Reviewed right tib/fib xrays, see above notes   Applied santyl, Allevyn pad to right lower leg.  Patient to be weightbearing as tolerated b/l   Discussed importance of daily foot examinations and proper shoe gear and to importance of lower Fasting Blood Glucose levels. Strict glycemic control for optimal wound healing.  Podiatry to follow in house.   Seen, Discussed and reviewed with Dr. Omalley

## 2024-02-23 NOTE — CHART NOTE - NSCHARTNOTEFT_GEN_A_CORE
PGY1 d/w infectious disease Dr. Arreaga     Patient's urine culture sensitivity + for VRE sensitive to linezolid     - Meropenem d/c  - START linezolid 600 mg BID IV for 7 - 10 days   - Patient is on SSRI at home, has not been receiving this medication during this hospital admission  - c/w WITHHOLD SSRI   - monitor for signs of serotonin syndrome    Will communicate above to primary medicine team

## 2024-02-24 LAB
ALBUMIN SERPL ELPH-MCNC: 1.6 G/DL — LOW (ref 3.5–5)
ALP SERPL-CCNC: 84 U/L — SIGNIFICANT CHANGE UP (ref 40–120)
ALT FLD-CCNC: 20 U/L DA — SIGNIFICANT CHANGE UP (ref 10–60)
ANION GAP SERPL CALC-SCNC: 4 MMOL/L — LOW (ref 5–17)
AST SERPL-CCNC: 15 U/L — SIGNIFICANT CHANGE UP (ref 10–40)
BASOPHILS # BLD AUTO: 0.05 K/UL — SIGNIFICANT CHANGE UP (ref 0–0.2)
BASOPHILS NFR BLD AUTO: 0.4 % — SIGNIFICANT CHANGE UP (ref 0–2)
BILIRUB SERPL-MCNC: 0.6 MG/DL — SIGNIFICANT CHANGE UP (ref 0.2–1.2)
BUN SERPL-MCNC: 8 MG/DL — SIGNIFICANT CHANGE UP (ref 7–18)
CALCIUM SERPL-MCNC: 8.1 MG/DL — LOW (ref 8.4–10.5)
CHLORIDE SERPL-SCNC: 107 MMOL/L — SIGNIFICANT CHANGE UP (ref 96–108)
CO2 SERPL-SCNC: 27 MMOL/L — SIGNIFICANT CHANGE UP (ref 22–31)
CREAT SERPL-MCNC: 0.42 MG/DL — LOW (ref 0.5–1.3)
EGFR: 102 ML/MIN/1.73M2 — SIGNIFICANT CHANGE UP
EOSINOPHIL # BLD AUTO: 0.14 K/UL — SIGNIFICANT CHANGE UP (ref 0–0.5)
EOSINOPHIL NFR BLD AUTO: 1.1 % — SIGNIFICANT CHANGE UP (ref 0–6)
GLUCOSE SERPL-MCNC: 132 MG/DL — HIGH (ref 70–99)
HCT VFR BLD CALC: 25.3 % — LOW (ref 34.5–45)
HGB BLD-MCNC: 7.9 G/DL — LOW (ref 11.5–15.5)
IMM GRANULOCYTES NFR BLD AUTO: 2.4 % — HIGH (ref 0–0.9)
LYMPHOCYTES # BLD AUTO: 1.87 K/UL — SIGNIFICANT CHANGE UP (ref 1–3.3)
LYMPHOCYTES # BLD AUTO: 14.7 % — SIGNIFICANT CHANGE UP (ref 13–44)
MAGNESIUM SERPL-MCNC: 1.6 MG/DL — SIGNIFICANT CHANGE UP (ref 1.6–2.6)
MCHC RBC-ENTMCNC: 30 PG — SIGNIFICANT CHANGE UP (ref 27–34)
MCHC RBC-ENTMCNC: 31.2 GM/DL — LOW (ref 32–36)
MCV RBC AUTO: 96.2 FL — SIGNIFICANT CHANGE UP (ref 80–100)
MONOCYTES # BLD AUTO: 1.11 K/UL — HIGH (ref 0–0.9)
MONOCYTES NFR BLD AUTO: 8.7 % — SIGNIFICANT CHANGE UP (ref 2–14)
NEUTROPHILS # BLD AUTO: 9.22 K/UL — HIGH (ref 1.8–7.4)
NEUTROPHILS NFR BLD AUTO: 72.7 % — SIGNIFICANT CHANGE UP (ref 43–77)
NRBC # BLD: 0 /100 WBCS — SIGNIFICANT CHANGE UP (ref 0–0)
PHOSPHATE SERPL-MCNC: 2 MG/DL — LOW (ref 2.5–4.5)
PLATELET # BLD AUTO: 214 K/UL — SIGNIFICANT CHANGE UP (ref 150–400)
POTASSIUM SERPL-MCNC: 3.6 MMOL/L — SIGNIFICANT CHANGE UP (ref 3.5–5.3)
POTASSIUM SERPL-SCNC: 3.6 MMOL/L — SIGNIFICANT CHANGE UP (ref 3.5–5.3)
PROT SERPL-MCNC: 4.8 G/DL — LOW (ref 6–8.3)
RBC # BLD: 2.63 M/UL — LOW (ref 3.8–5.2)
RBC # FLD: 20.6 % — HIGH (ref 10.3–14.5)
SODIUM SERPL-SCNC: 138 MMOL/L — SIGNIFICANT CHANGE UP (ref 135–145)
WBC # BLD: 12.69 K/UL — HIGH (ref 3.8–10.5)
WBC # FLD AUTO: 12.69 K/UL — HIGH (ref 3.8–10.5)

## 2024-02-24 RX ORDER — SODIUM,POTASSIUM PHOSPHATES 278-250MG
1 POWDER IN PACKET (EA) ORAL ONCE
Refills: 0 | Status: COMPLETED | OUTPATIENT
Start: 2024-02-24 | End: 2024-02-24

## 2024-02-24 RX ADMIN — NYSTATIN CREAM 1 APPLICATION(S): 100000 CREAM TOPICAL at 05:39

## 2024-02-24 RX ADMIN — Medication 1 PACKET(S): at 12:51

## 2024-02-24 RX ADMIN — Medication 650 MILLIGRAM(S): at 23:51

## 2024-02-24 RX ADMIN — Medication 3 MILLIGRAM(S): at 22:06

## 2024-02-24 RX ADMIN — LINEZOLID 300 MILLIGRAM(S): 600 INJECTION, SOLUTION INTRAVENOUS at 17:25

## 2024-02-24 RX ADMIN — CHLORHEXIDINE GLUCONATE 1 APPLICATION(S): 213 SOLUTION TOPICAL at 05:43

## 2024-02-24 RX ADMIN — Medication 125 MICROGRAM(S): at 05:38

## 2024-02-24 RX ADMIN — PANTOPRAZOLE SODIUM 40 MILLIGRAM(S): 20 TABLET, DELAYED RELEASE ORAL at 12:51

## 2024-02-24 RX ADMIN — LINEZOLID 300 MILLIGRAM(S): 600 INJECTION, SOLUTION INTRAVENOUS at 06:34

## 2024-02-24 RX ADMIN — NYSTATIN CREAM 1 APPLICATION(S): 100000 CREAM TOPICAL at 17:27

## 2024-02-24 NOTE — PROGRESS NOTE ADULT - ASSESSMENT
seen and examined vsstable afebrile physical done   onbed comforatble obese  awake not in any distress   denies cp or sob  no abd pain     HPI: 73 year old female AAOX3, from home, wheelchair-bound and uses cane occasionally , w/ PMHx COPD on 3 L NC at home, Obesity, Hypothyroidism, Diastolic HF on Lasix, Breast cancer s/p surgery, Asthma, Chronic lymphedema, Kidney cancer s/p partial nephrectomy discharged from [facility] on 2/16/24 for chf exacerbation and new Afib with RVR discharged on Eliquis presenting to ED after a fall at home earlier today. Patient states that she was using her cane to ambulate before tripping and falling landing on her nose, resulting in a wound on her nose that started to bleed prompting her to ED. Patient denies losing consciousness or any preceding symptoms such as headaches, dizziness, and chest pain. Patient reports that she did not take her eliquis since leaving the hospital because she noticed many bruises around her body. She denies any fevers, chills, headaches, dizziness, chest pain, cough, SOB, abd pain, n/v, diarrhea, constipation, hematuria, dysuria, numbness, and weakness.     vs stable afebrile physical done   not in any distress vsstable   lungs herat abd ok   anemia  asthma/copd/ melba , diastolic chf  , afib    cont same   sepsis  on zyvox   cont same  Dr Welch will follow from tomorrow

## 2024-02-24 NOTE — PROGRESS NOTE ADULT - SUBJECTIVE AND OBJECTIVE BOX
HPI:  HPI: 73 year old female AAOX3, from home, wheelchair-bound and uses cane occasionally , w/ PMHx COPD on 3 L NC at home, Obesity, Hypothyroidism, Diastolic HF on Lasix, Breast cancer s/p surgery, Asthma, Chronic lymphedema, Kidney cancer s/p partial nephrectomy discharged from [facility] on 24 for chf exacerbation and new Afib with RVR discharged on Eliquis presenting to ED after a fall at home earlier today. Patient states that she was using her cane to ambulate before tripping and falling landing on her nose, resulting in a wound on her nose that started to bleed prompting her to ED. Patient denies losing consciousness or any preceding symptoms such as headaches, dizziness, and chest pain. Patient reports that she did not take her eliquis since leaving the hospital because she noticed many bruises around her body. She denies any fevers, chills, headaches, dizziness, chest pain, cough, SOB, abd pain, n/v, diarrhea, constipation, hematuria, dysuria, numbness, and weakness.   In ED VS: HR 74, BP 88/56, RR 22, SPo2 87% 4LNC, WBC 34k, Hb 7.9, Cr 2.64, Trop 137, Lactate 5.4, BNP 10k, UA +, Acute 11-12 Rib fractures Left  s/p 2g cefepime, 1 g vanc, 2L NS bolus, started on levophed     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      History of carpal tunnel release  right wrist      History of parathyroidectomy      S/P laminectomy  now bed and wheelchair ridden with severe pain          SOCIAL HX:   Smoking        denies                 ETOH                            Other    FAMILY HISTORY:  Family history of myocardial infarction  mother  at age 67 and father at age 67 of MI's    Family history of hypertension  mother and father    Family history of diabetes mellitus (Sibling)  siblings    Family history of heart disease (Sibling)  brother has a PPM    Family history of thyroid cancer (Child)  daughter has thyroid cancer    :  No known cardiovacular family hisotry     ROS:  See HPI     Allergies    Grapes (Hives)  sulfa drugs (Unknown)  Peaches (Hives)  shellfish (Hives)    Intolerances          PHYSICAL EXAM    ICU Vital Signs Last 24 Hrs  T(C): 36.4 (2024 13:46), Max: 37 (2024 16:27)  T(F): 97.6 (2024 13:46), Max: 98.6 (2024 16:27)  HR: 72 (2024 15:42) (64 - 98)  BP: 122/63 (2024 15:42) (81/59 - 143/58)  BP(mean): --  ABP: --  ABP(mean): --  RR: 16 (2024 15:19) (16 - 23)  SpO2: 100% (2024 15:19) (87% - 100%)    O2 Parameters below as of 2024 15:19  Patient On (Oxygen Delivery Method): nasal cannula  O2 Flow (L/min): 4          General: obese female lying in bed, pale appearing   HEENT:  dry mucus membranes, nose laceration c/d/i            Lymphatic system: No LN  Lungs: Bilateral BS  Cardiovascular: Regular  Gastrointestinal: Soft, Positive BS  Musculoskeletal: No clubbing.  Moves all extremities.    Skin: hematomas on abdomen, upper ext b/l. RLE wound   Neurological: No motor or sensory deficit         LABS:                          7.9    34.23 )-----------( 323      ( 2024 11:00 )             25.6                                               02-20    135  |  104  |  38<H>  ----------------------------<  136<H>  3.9   |  24  |  2.36<H>    Ca    7.9<L>      2024 14:45    TPro  5.4<L>  /  Alb  1.9<L>  /  TBili  0.7  /  DBili  x   /  AST  53<H>  /  ALT  34  /  AlkPhos  90        PT/INR - ( 2024 11:00 )   PT: 13.6 sec;   INR: 1.20 ratio         PTT - ( 2024 11:00 )  PTT:43.0 sec                                       Urinalysis Basic - ( 2024 14:45 )    Color: x / Appearance: x / SG: x / pH: x  Gluc: 136 mg/dL / Ketone: x  / Bili: x / Urobili: x   Blood: x / Protein: x / Nitrite: x   Leuk Esterase: x / RBC: x / WBC x   Sq Epi: x / Non Sq Epi: x / Bacteria: x                                                  LIVER FUNCTIONS - ( 2024 14:45 )  Alb: 1.9 g/dL / Pro: 5.4 g/dL / ALK PHOS: 90 U/L / ALT: 34 U/L DA / AST: 53 U/L / GGT: x                                                                                                                                            (2024 16:16)      Patient is a 73y old  Female who presents with a chief complaint of Hypotension (2024 12:53)      INTERVAL HPI/OVERNIGHT EVENTS:  T(C): 37 (24 @ 13:18), Max: 37 (24 @ 13:18)  HR: 95 (24 @ 13:18) (79 - 95)  BP: 144/89 (24 @ 13:18) (119/74 - 144/89)  RR: 20 (24 @ 13:18) (18 - 22)  SpO2: 97% (24 @ 13:18) (97% - 99%)  Wt(kg): --  I&O's Summary    2024 07:01  -  2024 07:00  --------------------------------------------------------  IN: 510 mL / OUT: 600 mL / NET: -90 mL        REVIEW OF SYSTEMS: denies fever, chills, SOB, palpitations, chest pain, abdominal pain, nausea, vomitting, diarrhea, constipation, dizziness    MEDICATIONS  (STANDING):  chlorhexidine 2% Cloths 1 Application(s) Topical <User Schedule>  levothyroxine 125 MICROGram(s) Oral daily  linezolid  IVPB      linezolid  IVPB 600 milliGRAM(s) IV Intermittent every 12 hours  melatonin 3 milliGRAM(s) Oral at bedtime  nystatin Powder 1 Application(s) Topical two times a day  pantoprazole  Injectable 40 milliGRAM(s) IV Push daily    MEDICATIONS  (PRN):  acetaminophen     Tablet .. 650 milliGRAM(s) Oral every 6 hours PRN Temp greater or equal to 38C (100.4F), Mild Pain (1 - 3)      PHYSICAL EXAM:  GENERAL: NAD, well-groomed, well-developed  HEAD:  Atraumatic, Normocephalic  EYES: EOMI, PERRLA, conjunctiva and sclera clear  ENMT: No tonsillar erythema, exudates, or enlargement; Moist mucous membranes, Good dentition, No lesions  NECK: Supple, No JVD, Normal thyroid  NERVOUS SYSTEM:  Alert & Oriented X3, Good concentration; Motor Strength 5/5 B/L upper and lower extremities; DTRs 2+ intact and symmetric  CHEST/LUNG: Clear to percussion bilaterally; No rales, rhonchi, wheezing, or rubs  HEART: Regular rate and rhythm; No murmurs, rubs, or gallops  ABDOMEN: Soft, Nontender, Nondistended; Bowel sounds present  EXTREMITIES:  2+ Peripheral Pulses, No clubbing, cyanosis, or edema  LYMPH: No lymphadenopathy noted  SKIN: No rashes or lesions  LABS:                        7.9    12.69 )-----------( 214      ( 2024 07:39 )             25.3     02-24    138  |  107  |  8   ----------------------------<  132<H>  3.6   |  27  |  0.42<L>    Ca    8.1<L>      2024 07:39  Phos  2.0     02-24  Mg     1.6     -24    TPro  4.8<L>  /  Alb  1.6<L>  /  TBili  0.6  /  DBili  x   /  AST  15  /  ALT  20  /  AlkPhos  84  -24      Urinalysis Basic - ( 2024 07:39 )    Color: x / Appearance: x / SG: x / pH: x  Gluc: 132 mg/dL / Ketone: x  / Bili: x / Urobili: x   Blood: x / Protein: x / Nitrite: x   Leuk Esterase: x / RBC: x / WBC x   Sq Epi: x / Non Sq Epi: x / Bacteria: x      CAPILLARY BLOOD GLUCOSE            Urinalysis Basic - ( 2024 07:39 )    Color: x / Appearance: x / SG: x / pH: x  Gluc: 132 mg/dL / Ketone: x  / Bili: x / Urobili: x   Blood: x / Protein: x / Nitrite: x   Leuk Esterase: x / RBC: x / WBC x   Sq Epi: x / Non Sq Epi: x / Bacteria: x

## 2024-02-24 NOTE — PROGRESS NOTE ADULT - SUBJECTIVE AND OBJECTIVE BOX
Follow-up Critical Care Progress Note  Chief Complaint : Sepsis        No new events overnight.  Denies SOB/CP.     Allergies :Grapes (Hives)  sulfa drugs (Unknown)  Peaches (Hives)  shellfish (Hives)      PAST MEDICAL & SURGICAL HISTORY:  Hypertension    Hyperlipidemia    Anxiety    Graves disease    Kidney cancer, primary, with metastasis from kidney to other site, left  LEft sided- s/p nephrectomy only- no chemo or RT    Breast cancer in situ, left    COPD (chronic obstructive pulmonary disease)    Hypothyroid    DVT (deep venous thrombosis)  history of- not presently on A/C    Obesity    Sleep apnea    Asthma    S/P cholecystectomy    S/p nephrectomy  left    S/P breast biopsy  left    History of pelvic surgery  Pelvic Sling    History of eye surgery  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    History of carpal tunnel release  right wrist    History of parathyroidectomy    S/P laminectomy  now bed and wheelchair ridden with severe pain        Medications:  MEDICATIONS  (STANDING):  chlorhexidine 2% Cloths 1 Application(s) Topical <User Schedule>  levothyroxine 125 MICROGram(s) Oral daily  linezolid  IVPB      linezolid  IVPB 600 milliGRAM(s) IV Intermittent every 12 hours  melatonin 3 milliGRAM(s) Oral at bedtime  nystatin Powder 1 Application(s) Topical two times a day  pantoprazole  Injectable 40 milliGRAM(s) IV Push daily    MEDICATIONS  (PRN):  acetaminophen     Tablet .. 650 milliGRAM(s) Oral every 6 hours PRN Temp greater or equal to 38C (100.4F), Mild Pain (1 - 3)      Antibiotics History  cefepime   IVPB 2000 milliGRAM(s) IV Intermittent every 12 hours, 02-20-24 @ 17:16  cefepime   IVPB 2000 milliGRAM(s) IV Intermittent once, 02-20-24 @ 11:46, Stop order after: 1 Doses  linezolid  IVPB    , 02-23-24 @ 18:29  linezolid  IVPB 600 milliGRAM(s) IV Intermittent every 12 hours, 02-24-24 @ 06:00  linezolid  IVPB 600 milliGRAM(s) IV Intermittent once, 02-23-24 @ 14:27  meropenem  IVPB 1000 milliGRAM(s) IV Intermittent every 8 hours, 02-21-24 @ 10:05, Stop order after: 5 Days  vancomycin  IVPB. 1000 milliGRAM(s) IV Intermittent once, 02-20-24 @ 11:46, Stop order after: 1 Doses      Heme Medications       GI Medications  pantoprazole  Injectable 40 milliGRAM(s) IV Push daily, 02-24-24 @ 00:00,         LABS:                        7.9    12.69 )-----------( 214      ( 24 Feb 2024 07:39 )             25.3     02-24    138  |  107  |  8   ----------------------------<  132<H>  3.6   |  27  |  0.42<L>    Ca    8.1<L>      24 Feb 2024 07:39  Phos  2.0     02-24  Mg     1.6     02-24    TPro  4.8<L>  /  Alb  1.6<L>  /  TBili  0.6  /  DBili  x   /  AST  15  /  ALT  20  /  AlkPhos  84  02-24              Urinalysis Basic - ( 24 Feb 2024 07:39 )    Color: x / Appearance: x / SG: x / pH: x  Gluc: 132 mg/dL / Ketone: x  / Bili: x / Urobili: x   Blood: x / Protein: x / Nitrite: x   Leuk Esterase: x / RBC: x / WBC x   Sq Epi: x / Non Sq Epi: x / Bacteria: x              CULTURES: (if applicable)    Culture - Urine (collected 02-20-24 @ 14:25)  Source: Clean Catch Clean Catch (Midstream)  Final Report (02-23-24 @ 14:20):    >100,000 CFU/ml Enterococcus faecalis    >100,000 CFU/ml Enterococcus faecium (vancomycin resistant)  Organism: Enterococcus faecalis  Enterococcus faecium (vancomycin resistant) (02-23-24 @ 14:20)  Organism: Enterococcus faecium (vancomycin resistant) (02-23-24 @ 14:20)      Method Type: MARIA M      -  Ampicillin: R >8 Predicts results to ampicillin/sulbactam, amoxacillin-clavulanate and  piperacillin-tazobactam.      -  Ciprofloxacin: R >2      -  Daptomycin: SDD 2 The breakpoint for SDD (susceptible dose dependent)is based on a dosage regimen of 8-12 mg/kg administered every 24 h in adults and is intended for serious infections due to E. faecium. Consultation with an infectious diseases specialist is recommended.      -  Levofloxacin: R >4      -  Linezolid: S 1      -  Nitrofurantoin: R >64 Should not be used to treat pyelonephritis.      -  Tetracycline: R >8      -  Vancomycin: R >16  Organism: Enterococcus faecalis (02-23-24 @ 14:20)      Method Type: MARIA M      -  Ampicillin: S <=2 Predicts results to ampicillin/sulbactam, amoxacillin-clavulanate and  piperacillin-tazobactam.      -  Ciprofloxacin: S <=1      -  Levofloxacin: S <=0.5      -  Nitrofurantoin: S <=32 Should not be used to treat pyelonephritis.      -  Tetracycline: R >8      -  Vancomycin: S 2    Culture - Blood (collected 02-20-24 @ 11:00)  Source: .Blood Blood-Peripheral  Preliminary Report (02-23-24 @ 19:01):    No growth at 72 Hours    Culture - Blood (collected 02-20-24 @ 10:55)  Source: .Blood Blood-Peripheral  Preliminary Report (02-23-24 @ 19:01):    No growth at 72 Hours    Culture - Blood (collected 02-13-24 @ 07:10)  Source: .Blood Blood-Heart  Final Report (02-18-24 @ 14:01):    No growth at 5 days            CAPILLARY BLOOD GLUCOSE          RADIOLOGY  CXR:      CT:    ECHO:      VITALS:  T(C): 37 (02-24-24 @ 13:18), Max: 37 (02-24-24 @ 13:18)  T(F): 98.6 (02-24-24 @ 13:18), Max: 98.6 (02-24-24 @ 13:18)  HR: 95 (02-24-24 @ 13:18) (79 - 95)  BP: 144/89 (02-24-24 @ 13:18) (119/74 - 144/89)  BP(mean): 92 (02-23-24 @ 20:25) (92 - 92)  ABP: --  ABP(mean): --  RR: 20 (02-24-24 @ 13:18) (18 - 22)  SpO2: 97% (02-24-24 @ 13:18) (97% - 99%)  CVP(mm Hg): --  CVP(cm H2O): --    Ins and Outs     02-23-24 @ 07:01 - 02-24-24 @ 07:00  --------------------------------------------------------  IN: 510 mL / OUT: 600 mL / NET: -90 mL        Height (cm): 152.9 (02-22-24 @ 11:44)        I&O's Detail    23 Feb 2024 07:01  -  24 Feb 2024 07:00  --------------------------------------------------------  IN:    IV PiggyBack: 50 mL    IV PiggyBack: 100 mL    Oral Fluid: 360 mL  Total IN: 510 mL    OUT:    Voided (mL): 600 mL  Total OUT: 600 mL    Total NET: -90 mL        PHYSICAL EXAM:  GENERAL: NAD, obese habitus, chronic UE tremors  EYES: EOMI, PERRLA  NECK: Supple, No JVD; Normal thyroid; Trachea midline: No LAD   NERVOUS SYSTEM:  Alert & Oriented X2-3,forgetful, follows commands    CHEST/LUNG: No rales, rhonchi, wheezing, breath sounds present bilaterally, saturating well on 3 L NC  HEART: Regular rate and rhythm; No murmurs, no gallops  ABDOMEN: Soft, Nontender, Nondistended; Bowel sounds present, no pain or masses on palpation  : voiding well, primafit in place  EXTREMITIES:  2+ Peripheral Pulses, No clubbing, cyanosis, + non pitting BL pedal lymphedema, + anterior R leg wound - 4x4 cm with surrounding erythema , local warmth - NON tender- dressing intact  SKIN:ecchymoses, swelling and erythema over R UE- mildly tender to palpation .

## 2024-02-24 NOTE — PROGRESS NOTE ADULT - ASSESSMENT
73 year old female AAOX3, from home, wheelchair-bound and uses cane occasionally , w/ PMHx COPD on 3 L NC at home, Obesity, Hypothyroidism, Diastolic HF on Lasix, Breast cancer s/p surgery, Asthma, Chronic lymphedema, Kidney cancer s/p partial nephrectomy discharged from [facility] on 2/16/24 for chf exacerbation and new Afib with RVR discharged on Eliquis presenting to ED after a fall at home earlier today admitted for hypotension likely septic vs hypovolemic shock.    Exam with multiple cutaneous hematomas. Chronic pedal edema. States has been bruising for a while now. Was started on anticoagulation recently for new onset afib. Labs significant for ALDAIR, lactic acidosis, worsening anemia.     CT scans performed in the ED suggestive of multiple acute and chronic rib fractures. Cutaneous hematomas. UA noted to be positive, concerning for UTI.     Post volume resuscitation assessment performed, now on vasopressor support     Assessment:  1. Septic Shock  2. Lactic acidosis   3. Acute kidney injury likely ATN   4. UTI  5. Acute on chronic anemia   6. Multiple hematomas   7. Recurrent falls  8. Heart failure with preserved EF   9. COPD   10. Chronic afib     Plan:  - Hgb stable post transfusion  - Hold all anticoagulation for now, given recurrent falls at home, likely not a candidate for long term AC  - S/p IV fluid resuscitation  - Hold diuretics and antihypertensives  - Hemodynamic monitoring  - Antibiotics per ID  - Cont. home COPD medications  - Podiatry and wound care consult  - Palliative care consult given multiple hospitalizations, refusing therapies   - DNR/DNI code status

## 2024-02-24 NOTE — PROGRESS NOTE ADULT - SUBJECTIVE AND OBJECTIVE BOX
Interval: Patient was seen resting bedside in no acute distress. Denies any other pedal complaints at this time.     HPI: 73 year old female AAOX3, from home, wheelchair-bound and uses cane occasionally , w/ PMHx COPD on 3 L NC at home, Obesity, Hypothyroidism, Diastolic HF on Lasix, Breast cancer s/p surgery, Asthma, Chronic lymphedema, Kidney cancer s/p partial nephrectomy discharged from [facility] beds [facility] the [facility] on 2/16/24 for chf exacerbation and new Afib with RVR discharged on Eliquis presenting to ED after a fall at home earlier today. Patient states that she was using her cane to ambulate before tripping and falling landing on her nose, resulting in a wound on her nose that started to bleed prompting her to ED. Patient denies losing consciousness or any preceding symptoms such as headaches, dizziness, and chest pain. Patient reports that she did not take her eliquis since leaving the hospital because she noticed many bruises around her body. She denies any fevers, chills, headaches, dizziness, chest pain, cough, SOB, abd pain, n/v, diarrhea, constipation, hematuria, dysuria, numbness, and weakness.   In ED VS: HR 74, BP 88/56, RR 22, SPo2 87% 4LNC, WBC 34k, Hb 7.9, Cr 2.64, Trop 137, Lactate 5.4, BNP 10k, UA +, Acute 11-12 Rib fractures Left  s/p 2g cefepime, 1 g vanc, 2L NS bolus, started on levophed     of note she states while transporting via EMS, she developed a wound to her right lower extremity with stabbing pain to the area.     Medications acetaminophen     Tablet .. 650 milliGRAM(s) Oral every 6 hours PRN  chlorhexidine 2% Cloths 1 Application(s) Topical <User Schedule>  levothyroxine 125 MICROGram(s) Oral daily  linezolid  IVPB      linezolid  IVPB 600 milliGRAM(s) IV Intermittent every 12 hours  melatonin 3 milliGRAM(s) Oral at bedtime  nystatin Powder 1 Application(s) Topical two times a day  pantoprazole  Injectable 40 milliGRAM(s) IV Push daily    FHFamily history of myocardial infarction    Family history of hypertension    Family history of diabetes mellitus (Sibling)    Family history of heart disease (Sibling)    Family history of thyroid cancer (Child)    ,   PMHHypertension    Hyperlipidemia    Anxiety    Graves disease    Kidney cancer, primary, with metastasis from kidney to other site, left    Breast cancer in situ, left    COPD (chronic obstructive pulmonary disease)    Hypothyroid    DVT (deep venous thrombosis)    Obesity    Sleep apnea    Asthma       PSHS/P cholecystectomy    S/p nephrectomy    S/P breast biopsy    History of pelvic surgery    History of eye surgery    History of carpal tunnel release    History of parathyroidectomy    S/P laminectomy        Labs                          7.9    12.69 )-----------( 214      ( 24 Feb 2024 07:39 )             25.3      02-24    138  |  107  |  8   ----------------------------<  132<H>  3.6   |  27  |  0.42<L>    Ca    8.1<L>      24 Feb 2024 07:39  Phos  2.0     02-24  Mg     1.6     02-24    TPro  4.8<L>  /  Alb  1.6<L>  /  TBili  0.6  /  DBili  x   /  AST  15  /  ALT  20  /  AlkPhos  84  02-24     Vital Signs Last 24 Hrs  T(C): 36.7 (24 Feb 2024 05:20), Max: 37.4 (23 Feb 2024 14:00)  T(F): 98 (24 Feb 2024 05:20), Max: 99.3 (23 Feb 2024 14:00)  HR: 79 (24 Feb 2024 05:20) (79 - 98)  BP: 119/74 (24 Feb 2024 05:20) (119/74 - 132/71)  BP(mean): 92 (23 Feb 2024 20:25) (81 - 185)  RR: 22 (24 Feb 2024 05:20) (14 - 22)  SpO2: 97% (24 Feb 2024 05:20) (97% - 100%)    Parameters below as of 24 Feb 2024 05:20  Patient On (Oxygen Delivery Method): nasal cannula  O2 Flow (L/min): 3            WBC Count: 12.69 K/uL *H* (02-24-24 @ 07:39)    ROS: Unremarkable outside HPI      LE Focused Exam  Vascular: DP 1/4 PT 1/4 b/l. CFT <3 secs x 10. Temp Gradient warm to cool b/l. Pedal hair absent. +non-pitting edema b/l  Dermatological: Partial flap, skin tear noted measuring 10x7x0.1cm, No drainage, surrounding erythema or clinical signs of infection.   Neurological: Patient is awake, alert and oriented x3. Gross Epicritic sensation is intact  MSK: + pain on palpation to wound.  Negative desmond sign b/l

## 2024-02-25 ENCOUNTER — TRANSCRIPTION ENCOUNTER (OUTPATIENT)
Age: 74
End: 2024-02-25

## 2024-02-25 DIAGNOSIS — N17.9 ACUTE KIDNEY FAILURE, UNSPECIFIED: ICD-10-CM

## 2024-02-25 DIAGNOSIS — Z71.89 OTHER SPECIFIED COUNSELING: ICD-10-CM

## 2024-02-25 DIAGNOSIS — D64.9 ANEMIA, UNSPECIFIED: ICD-10-CM
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
## 2024-02-25 DIAGNOSIS — N39.0 URINARY TRACT INFECTION, SITE NOT SPECIFIED: ICD-10-CM

## 2024-02-25 DIAGNOSIS — E87.20 ACIDOSIS, UNSPECIFIED: ICD-10-CM

## 2024-02-25 LAB
ALBUMIN SERPL ELPH-MCNC: 1.7 G/DL — LOW (ref 3.5–5)
ALP SERPL-CCNC: 94 U/L — SIGNIFICANT CHANGE UP (ref 40–120)
ALT FLD-CCNC: 20 U/L DA — SIGNIFICANT CHANGE UP (ref 10–60)
ANION GAP SERPL CALC-SCNC: 5 MMOL/L — SIGNIFICANT CHANGE UP (ref 5–17)
AST SERPL-CCNC: 12 U/L — SIGNIFICANT CHANGE UP (ref 10–40)
BASOPHILS # BLD AUTO: 0.05 K/UL — SIGNIFICANT CHANGE UP (ref 0–0.2)
BASOPHILS NFR BLD AUTO: 0.4 % — SIGNIFICANT CHANGE UP (ref 0–2)
BILIRUB SERPL-MCNC: 0.5 MG/DL — SIGNIFICANT CHANGE UP (ref 0.2–1.2)
BUN SERPL-MCNC: 6 MG/DL — LOW (ref 7–18)
CALCIUM SERPL-MCNC: 8.5 MG/DL — SIGNIFICANT CHANGE UP (ref 8.4–10.5)
CHLORIDE SERPL-SCNC: 107 MMOL/L — SIGNIFICANT CHANGE UP (ref 96–108)
CO2 SERPL-SCNC: 25 MMOL/L — SIGNIFICANT CHANGE UP (ref 22–31)
CREAT SERPL-MCNC: 0.51 MG/DL — SIGNIFICANT CHANGE UP (ref 0.5–1.3)
CULTURE RESULTS: SIGNIFICANT CHANGE UP
CULTURE RESULTS: SIGNIFICANT CHANGE UP
EGFR: 97 ML/MIN/1.73M2 — SIGNIFICANT CHANGE UP
EOSINOPHIL # BLD AUTO: 0.13 K/UL — SIGNIFICANT CHANGE UP (ref 0–0.5)
EOSINOPHIL NFR BLD AUTO: 1.1 % — SIGNIFICANT CHANGE UP (ref 0–6)
GLUCOSE SERPL-MCNC: 102 MG/DL — HIGH (ref 70–99)
HCT VFR BLD CALC: 27.1 % — LOW (ref 34.5–45)
HGB BLD-MCNC: 8.4 G/DL — LOW (ref 11.5–15.5)
IMM GRANULOCYTES NFR BLD AUTO: 3.1 % — HIGH (ref 0–0.9)
LYMPHOCYTES # BLD AUTO: 18.3 % — SIGNIFICANT CHANGE UP (ref 13–44)
LYMPHOCYTES # BLD AUTO: 2.16 K/UL — SIGNIFICANT CHANGE UP (ref 1–3.3)
MAGNESIUM SERPL-MCNC: 1.4 MG/DL — LOW (ref 1.6–2.6)
MCHC RBC-ENTMCNC: 29.7 PG — SIGNIFICANT CHANGE UP (ref 27–34)
MCHC RBC-ENTMCNC: 31 GM/DL — LOW (ref 32–36)
MCV RBC AUTO: 95.8 FL — SIGNIFICANT CHANGE UP (ref 80–100)
MONOCYTES # BLD AUTO: 1.24 K/UL — HIGH (ref 0–0.9)
MONOCYTES NFR BLD AUTO: 10.5 % — SIGNIFICANT CHANGE UP (ref 2–14)
NEUTROPHILS # BLD AUTO: 7.86 K/UL — HIGH (ref 1.8–7.4)
NEUTROPHILS NFR BLD AUTO: 66.6 % — SIGNIFICANT CHANGE UP (ref 43–77)
NRBC # BLD: 0 /100 WBCS — SIGNIFICANT CHANGE UP (ref 0–0)
PHOSPHATE SERPL-MCNC: 1.9 MG/DL — LOW (ref 2.5–4.5)
PLATELET # BLD AUTO: 199 K/UL — SIGNIFICANT CHANGE UP (ref 150–400)
POTASSIUM SERPL-MCNC: 3.9 MMOL/L — SIGNIFICANT CHANGE UP (ref 3.5–5.3)
POTASSIUM SERPL-SCNC: 3.9 MMOL/L — SIGNIFICANT CHANGE UP (ref 3.5–5.3)
PROT SERPL-MCNC: 5.4 G/DL — LOW (ref 6–8.3)
RBC # BLD: 2.83 M/UL — LOW (ref 3.8–5.2)
RBC # FLD: 20.8 % — HIGH (ref 10.3–14.5)
SODIUM SERPL-SCNC: 137 MMOL/L — SIGNIFICANT CHANGE UP (ref 135–145)
SPECIMEN SOURCE: SIGNIFICANT CHANGE UP
SPECIMEN SOURCE: SIGNIFICANT CHANGE UP
WBC # BLD: 11.8 K/UL — HIGH (ref 3.8–10.5)
WBC # FLD AUTO: 11.8 K/UL — HIGH (ref 3.8–10.5)

## 2024-02-25 RX ORDER — HEPARIN SODIUM 5000 [USP'U]/ML
5000 INJECTION INTRAVENOUS; SUBCUTANEOUS EVERY 12 HOURS
Refills: 0 | Status: DISCONTINUED | OUTPATIENT
Start: 2024-02-25 | End: 2024-02-26

## 2024-02-25 RX ORDER — MAGNESIUM SULFATE 500 MG/ML
2 VIAL (ML) INJECTION ONCE
Refills: 0 | Status: COMPLETED | OUTPATIENT
Start: 2024-02-25 | End: 2024-02-25

## 2024-02-25 RX ORDER — FUROSEMIDE 40 MG
20 TABLET ORAL DAILY
Refills: 0 | Status: DISCONTINUED | OUTPATIENT
Start: 2024-02-25 | End: 2024-02-27

## 2024-02-25 RX ADMIN — PANTOPRAZOLE SODIUM 40 MILLIGRAM(S): 20 TABLET, DELAYED RELEASE ORAL at 12:51

## 2024-02-25 RX ADMIN — Medication 62.5 MILLIMOLE(S): at 12:51

## 2024-02-25 RX ADMIN — HEPARIN SODIUM 5000 UNIT(S): 5000 INJECTION INTRAVENOUS; SUBCUTANEOUS at 17:25

## 2024-02-25 RX ADMIN — Medication 3 MILLIGRAM(S): at 21:44

## 2024-02-25 RX ADMIN — Medication 25 GRAM(S): at 12:51

## 2024-02-25 RX ADMIN — LINEZOLID 300 MILLIGRAM(S): 600 INJECTION, SOLUTION INTRAVENOUS at 17:25

## 2024-02-25 RX ADMIN — Medication 20 MILLIGRAM(S): at 12:51

## 2024-02-25 RX ADMIN — Medication 650 MILLIGRAM(S): at 00:30

## 2024-02-25 RX ADMIN — Medication 650 MILLIGRAM(S): at 17:32

## 2024-02-25 RX ADMIN — NYSTATIN CREAM 1 APPLICATION(S): 100000 CREAM TOPICAL at 17:26

## 2024-02-25 RX ADMIN — Medication 125 MICROGRAM(S): at 05:40

## 2024-02-25 RX ADMIN — CHLORHEXIDINE GLUCONATE 1 APPLICATION(S): 213 SOLUTION TOPICAL at 05:40

## 2024-02-25 RX ADMIN — NYSTATIN CREAM 1 APPLICATION(S): 100000 CREAM TOPICAL at 05:40

## 2024-02-25 RX ADMIN — Medication 650 MILLIGRAM(S): at 16:49

## 2024-02-25 RX ADMIN — LINEZOLID 300 MILLIGRAM(S): 600 INJECTION, SOLUTION INTRAVENOUS at 05:41

## 2024-02-25 NOTE — PROGRESS NOTE ADULT - PROBLEM SELECTOR PLAN 7
Presented to ER post fall   PT following   Patient may benefit from [facility] - Initially she was refusing rehab - Today stated she is considering it

## 2024-02-25 NOTE — PROGRESS NOTE ADULT - SUBJECTIVE AND OBJECTIVE BOX
Interval: Patient was seen resting bedside in no acute distress. Denies any other pedal complaints at this time.     HPI: 73 year old female AAOX3, from home, wheelchair-bound and uses cane occasionally , w/ PMHx COPD on 3 L NC at home, Obesity, Hypothyroidism, Diastolic HF on Lasix, Breast cancer s/p surgery, Asthma, Chronic lymphedema, Kidney cancer s/p partial nephrectomy discharged from [facility] on 2/16/24 for chf exacerbation and new Afib with RVR discharged on Eliquis presenting to ED after a fall at home earlier today. Patient states that she was using her cane to ambulate before tripping and falling landing on her nose, resulting in a wound on her nose that started to bleed prompting her to ED. Patient denies losing consciousness or any preceding symptoms such as headaches, dizziness, and chest pain. Patient reports that she did not take her eliquis since leaving the hospital because she noticed many bruises around her body. She denies any fevers, chills, headaches, dizziness, chest pain, cough, SOB, abd pain, n/v, diarrhea, constipation, hematuria, dysuria, numbness, and weakness.   In ED VS: HR 74, BP 88/56, RR 22, SPo2 87% 4LNC, WBC 34k, Hb 7.9, Cr 2.64, Trop 137, Lactate 5.4, BNP 10k, UA +, Acute 11-12 Rib fractures Left  s/p 2g cefepime, 1 g vanc, 2L NS bolus, started on levophed     of note she states while transporting via EMS, she developed a wound to her right lower extremity with stabbing pain to the area.         Medications acetaminophen     Tablet .. 650 milliGRAM(s) Oral every 6 hours PRN  chlorhexidine 2% Cloths 1 Application(s) Topical <User Schedule>  furosemide   Injectable 20 milliGRAM(s) IV Push daily  heparin   Injectable 5000 Unit(s) SubCutaneous every 12 hours  levothyroxine 125 MICROGram(s) Oral daily  linezolid  IVPB      linezolid  IVPB 600 milliGRAM(s) IV Intermittent every 12 hours  melatonin 3 milliGRAM(s) Oral at bedtime  nystatin Powder 1 Application(s) Topical two times a day  pantoprazole  Injectable 40 milliGRAM(s) IV Push daily    FHFamily history of myocardial infarction    Family history of hypertension    Family history of diabetes mellitus (Sibling)    Family history of heart disease (Sibling)    Family history of thyroid cancer (Child)    ,   PMHHypertension    Hyperlipidemia    Anxiety    Graves disease    Kidney cancer, primary, with metastasis from kidney to other site, left    Breast cancer in situ, left    COPD (chronic obstructive pulmonary disease)    Hypothyroid    DVT (deep venous thrombosis)    Obesity    Sleep apnea    Asthma       PSHS/P cholecystectomy    S/p nephrectomy    S/P breast biopsy    History of pelvic surgery    History of eye surgery    History of carpal tunnel release    History of parathyroidectomy    S/P laminectomy        Labs                          8.4    11.80 )-----------( 199      ( 25 Feb 2024 07:32 )             27.1      02-25    137  |  107  |  6<L>  ----------------------------<  102<H>  3.9   |  25  |  0.51    Ca    8.5      25 Feb 2024 07:32  Phos  1.9     02-25  Mg     1.4     02-25    TPro  5.4<L>  /  Alb  1.7<L>  /  TBili  0.5  /  DBili  x   /  AST  12  /  ALT  20  /  AlkPhos  94  02-25     Vital Signs Last 24 Hrs  T(C): 36.7 (25 Feb 2024 12:36), Max: 37 (24 Feb 2024 13:18)  T(F): 98 (25 Feb 2024 12:36), Max: 98.6 (24 Feb 2024 13:18)  HR: 80 (25 Feb 2024 12:36) (73 - 95)  BP: 126/75 (25 Feb 2024 12:36) (126/75 - 151/70)  BP(mean): 94 (25 Feb 2024 12:36) (94 - 94)  RR: 19 (25 Feb 2024 12:36) (19 - 20)  SpO2: 96% (25 Feb 2024 12:36) (96% - 100%)    Parameters below as of 25 Feb 2024 12:36  Patient On (Oxygen Delivery Method): nasal cannula  O2 Flow (L/min): 3            WBC Count: 11.80 K/uL *H* (02-25-24 @ 07:32)      LE Focused Exam  Vascular: DP 1/4 PT 1/4 b/l. CFT <3 secs x 10. Temp Gradient warm to cool b/l. Pedal hair absent. +non-pitting edema b/l  Dermatological: Partial flap, skin tear noted measuring 10x7x0.1cm, No drainage, surrounding erythema or clinical signs of infection.   Neurological: Patient is awake, alert and oriented x3. Gross Epicritic sensation is intact  MSK: + pain on palpation to wound.  Negative desmond sign b/l

## 2024-02-25 NOTE — PROGRESS NOTE ADULT - SUBJECTIVE AND OBJECTIVE BOX
SHEKHAR VASQUEZ    SCU progress note    INTERVAL HPI/OVERNIGHT EVENTS: No acute overnight events. Patient seen and examined at bedside.    DNR [x ]   DNI  [x  ]- Trial of NIV    Covid - 19 PCR: COVID-19 PCR: [facility] (20 Feb 2024 11:00)        The 4Ms    What Matters Most: see GOC  Age appropriate Medications/Screen for High Risk Medication: Yes  Mentation: see CAM below  Mobility: defer to physical exam    The Confusion Assessment Method (CAM) Diagnostic Algorithm     1: Acute Onset or Fluctuating Course  - Is there evidence of an acute change in mental status from the patient’s baseline? Did the (abnormal) behavior  fluctuate during the day, that is, tend to come and go, or increase and decrease in severity?  [ ] YES [X ] NO     2: Inattention  - Did the patient have difficulty focusing attention, being easily distractible, or having difficulty keeping track of what was being said?  [ ] YES [X ] NO     3: Disorganized thinking  -Was the patient’s thinking disorganized or incoherent, such as rambling or irrelevant conversation, unclear or illogical flow of ideas, or unpredictable switching from subject to subject?  [ ] YES [X ] NO    4: Altered Level of consciousness?  [ ] YES [X ] NO    The diagnosis of delirium by CAM requires the presence of features 1 and 2 and either 3 or 4.    PRESSORS: [ ] YES [ X] NO  linezolid  IVPB      linezolid  IVPB 600 milliGRAM(s) IV Intermittent every 12 hours    Cardiovascular:  Heart Failure  Acute   Acute on Chronic  Chronic         Pulmonary:    Hematalogic:    Other:  acetaminophen     Tablet .. 650 milliGRAM(s) Oral every 6 hours PRN  chlorhexidine 2% Cloths 1 Application(s) Topical <User Schedule>  levothyroxine 125 MICROGram(s) Oral daily  melatonin 3 milliGRAM(s) Oral at bedtime  nystatin Powder 1 Application(s) Topical two times a day  pantoprazole  Injectable 40 milliGRAM(s) IV Push daily    acetaminophen     Tablet .. 650 milliGRAM(s) Oral every 6 hours PRN  chlorhexidine 2% Cloths 1 Application(s) Topical <User Schedule>  levothyroxine 125 MICROGram(s) Oral daily  linezolid  IVPB      linezolid  IVPB 600 milliGRAM(s) IV Intermittent every 12 hours  melatonin 3 milliGRAM(s) Oral at bedtime  nystatin Powder 1 Application(s) Topical two times a day  pantoprazole  Injectable 40 milliGRAM(s) IV Push daily    Drug Dosing Weight  Height (cm): 152.9 (22 Feb 2024 11:44)  Weight (kg): 104 (20 Feb 2024 10:36)  BMI (kg/m2): 44.5 (22 Feb 2024 11:44)  BSA (m2): 1.98 (22 Feb 2024 11:44)    CENTRAL LINE: [ ] YES [X ] NO  LOCATION:   DATE INSERTED:  REMOVE: [ ] YES [ ] NO  EXPLAIN:    PINZON: [ ] YES [X ] NO    DATE INSERTED:  REMOVE:  [ ] YES [ ] NO  EXPLAIN:    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      History of carpal tunnel release  right wrist      History of parathyroidectomy      S/P laminectomy  now bed and wheelchair ridden with severe pain                        PHYSICAL EXAM:    GENERAL: In no acute distress, obese habitus  EYES: EOMI, PERRLA  NECK: Supple, No JVD; Normal thyroid; Trachea midline  NERVOUS SYSTEM:  Alert & Oriented X 3 , knows president ,TAYLOR X 4 with generalized weakness,  follows commands    CHEST/LUNG: No rales, rhonchi, wheezing, breath sounds present bilaterally, mildly diminished to bases  saturating well on 3 L NC  HEART: Regular rate and rhythm; No murmurs, no gallops  ABDOMEN: Soft, obese, Nontender, Nondistended; Bowel sounds present  : voiding well, primafit in place  EXTREMITIES: + Pedal pulses, No clubbing, cyanosis, + non pitting BL pedal lymphedema ( chronic) , R> L SKIN: chronic lymphedema , diffuse ecchymosis b/l upper arms  swelling and erythema over R UE, + anterior R leg wound - 4x4 cm with surrounding erythema , local warmth - NON tender- dressing intact          LABS:  CBC Full  -  ( 25 Feb 2024 07:32 )  WBC Count : 11.80 K/uL  RBC Count : 2.83 M/uL  Hemoglobin : 8.4 g/dL  Hematocrit : 27.1 %  Platelet Count - Automated : 199 K/uL  Mean Cell Volume : 95.8 fl  Mean Cell Hemoglobin : 29.7 pg  Mean Cell Hemoglobin Concentration : 31.0 gm/dL  Auto Neutrophil # : 7.86 K/uL  Auto Lymphocyte # : 2.16 K/uL  Auto Monocyte # : 1.24 K/uL  Auto Eosinophil # : 0.13 K/uL  Auto Basophil # : 0.05 K/uL  Auto Neutrophil % : 66.6 %  Auto Lymphocyte % : 18.3 %  Auto Monocyte % : 10.5 %  Auto Eosinophil % : 1.1 %  Auto Basophil % : 0.4 %    02-24    138  |  107  |  8   ----------------------------<  132<H>  3.6   |  27  |  0.42<L>    Ca    8.1<L>      24 Feb 2024 07:39  Phos  2.0     02-24  Mg     1.6     02-24    TPro  4.8<L>  /  Alb  1.6<L>  /  TBili  0.6  /  DBili  x   /  AST  15  /  ALT  20  /  AlkPhos  84  02-24      Urinalysis Basic - ( 24 Feb 2024 07:39 )    Color: x / Appearance: x / SG: x / pH: x  Gluc: 132 mg/dL / Ketone: x  / Bili: x / Urobili: x   Blood: x / Protein: x / Nitrite: x   Leuk Esterase: x / RBC: x / WBC x   Sq Epi: x / Non Sq Epi: x / Bacteria: x            [ x ]  DVT Prophylaxis - SCD  [ X ]  Nutrition Diet, Regular:   Easy to Chew (EASYTOCHEW) (02-21-24 @ 10:10) [Active]          RADIOLOGY & ADDITIONAL STUDIES:      ACC: [id number] EXAM:  XR TIB FIB AP LAT 2 VIEWS RT   ORDERED BY: SAVANA HARRISON     PROCEDURE DATE:  02/22/2024          INTERPRETATION:  Right lower extremity  wound    2 views right tibia-fibula.    IMPRESSION: Hardware fixation as well as bonyfusion of the distal tibia   and fibula. Intact hardware.  no acute fracture or dislocation. No focal   bone destruction or unusual periosteal reaction. Partially visualized   degenerative change at the ankle. Diffuse soft tissue edema.    --- End ofReport ---    ABDOULAYE ALLRED MD; Attending Radiologist  This document has been electronically signed. Feb 23 2024  2:43PM        ACC: [id number] EXAM:  CT CHEST   ORDERED BY: JOSE COELHO     ACC: [id number] EXAM:  CT ABDOMEN AND PELVIS   ORDERED BY: JOSE COELHO     PROCEDURE DATE:  02/20/2024          INTERPRETATION:  CLINICAL INFORMATION: Sepsis    COMPARISON: Chest CT dated 01/13/2024and CT of the abdomen and pelvis   dated 06/16/2015    CONTRAST/COMPLICATIONS:  IV Contrast: NONE  Oral Contrast: NONE  Complications: None reported at time of study completion    PROCEDURE:  CT of the Chest, Abdomen and Pelvis was performed.  Sagittal and coronal reformats were performed.    FINDINGS:  CHEST:  LUNGS AND LARGE AIRWAYS: Patent central airways. Emphysema. Improved   appearance of previously noted basilar posterior left upper lobe   tree-in-bud opacities and left lower lobe consolidation. Small residual   branching opacities in the left lower lobe corresponding to location of   previously noted consolidation. Lower lobe subsegmental atelectasis. Mild   peripheral fibrotic changes. With few tiny calcified and granulomas.  PLEURA:No pleural effusion.  VESSELS: Aortic and arch vessel calcification. Enlarged main pulmonary   artery, suspect underlying pulmonary hypertension.  HEART: Cardiomegaly. No pericardial effusion. Aortic valvular   calcification.  MEDIASTINUM AND MIYA: No lymphadenopathy. Small hiatal hernia.  CHEST WALL AND LOWER NECK: Hemorrhagic changes within bilateral   pectoralis musculature and subjacent tissues.    ABDOMEN AND PELVIS:  LIVER: Within normal limits.  BILE DUCTS: Normal caliber.  GALLBLADDER: Cholecystectomy.  SPLEEN: Within normal limits.  PANCREAS: Partial fatty atrophy. Scattered calcifications, primarily in   the head.  ADRENALS: Within normal limits.  KIDNEYS/URETERS: Bilateral cysts. Mild nonobstructing nephrolithiasis,   punctate stone in each kidney.    BLADDER: Completely decompressed, coiled catheter in place.  REPRODUCTIVE ORGANS: Within normal limits. Coarse right adnexal   calcification.    BOWEL: No bowel obstruction. Appendix is normal. Scant colonic   diverticula.  PERITONEUM: No ascites.  VESSELS: Atherosclerotic changes.  RETROPERITONEUM/LYMPH NODES: No lymphadenopathy.  ABDOMINAL WALL: 7-8 cm fat-containing midline epigastric hernia and small   fat-containing umbilical hernia. Foci of hemorrhagic change within the   low ventral subcutaneous tissues and droplets of air, correlate for   history of subcutaneous injection. Associated skin thickening. Mild soft   tissue contusion in the left upper gluteal/flank region.  BONES: Acute fractures of the posterior left 11th and 12th ribs, 11th rib   fracture displaced/overlapping and 12th rib fracture nondisplaced.   Numerous bilateral chronic and subacute/chronic bilateral rib fractures.   Partially imaged cervicothoracic fusion hardware. For detailed   discussion, see CT of the cervical spine dated 02/13/2024. Degenerative   changes of the spine. Chronic right iliac bone defect presumably related   to prior bone graft procedure.    IMPRESSION:  Chest CT:  1.  Near-complete complete resolution of previously noted   pneumonia/airways disease.  2.  Acute posterior left 11th and 12th rib fractures  3.  Posttraumatic hemorrhagic changes within the chest wall musculature   and subcutaneous tissues.    Abdomen/pelvis:  1.  Foci of hemorrhagic changes within the ventral soft tissues likely   related to subcutaneous injection.  2.  Otherwise, no acute findings.    KATYA NAVARRETE MD; Attending Radiologist  This document has been electronically signed. Feb 20 2024  1:49PM    TRANSTHORACIC ECHOCARDIOGRAM REPORT     Pt. Name:       SHEKHAR VASQUEZ Study Date:    2/21/2024  MRN:            [id number]       YOB: 1950  ECHO TTE WITH CON COMP W DOPP - .m;DEFINITY ECHO                      CONTRAST PER ML - .m  Indication(s):      Cardiomyopathy, unspecified - I42.9  Procedure:          Transthoracic echocardiogram with 2-D, M-mode and complete                      spectral and color flow Doppler.       CONCLUSIONS:      1. Technically difficult image quality.   2. Left ventricular endocardium is not well visualized; however, the left ventricular systolic function appears grossly normal.   3. There is normal LV mass and concentric remodeling.   4. There is mild (grade 1) left ventricular diastolic dysfunction.  5. 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 Given the very poor image quality of this study the nature of this gradient cannot be further charachterized.   6. The right ventricle is not well visualized.        Goals of Care Discussion with Family/Proxy  - see note from 2/22/2024 ( Palliative care note)      ASSESSMENT  73 year old female AAOX3, from home, wheelchair-bound and uses cane occasionally , w/ PMHx COPD on 3 L NC at home, Obesity, Hypothyroidism, Diastolic HF on Lasix, Breast cancer s/p surgery, Asthma, Chronic lymphedema, Kidney cancer s/p partial nephrectomy discharged from [facility] on 2/16/24 for chf exacerbation and new Afib with RVR discharged on Eliquis presenting to ED after a fall at home earlier today admitted for hypotension likely septic vs hypovolemic shock 2/2 UTI      SHEKHAR VASQUEZ    SCU progress note    INTERVAL HPI/OVERNIGHT EVENTS: No acute overnight events. Patient seen and examined at bedside.    DNR [x ]   DNI  [x  ]- Trial of NIV    Covid - 19 PCR: COVID-19 PCR: [facility] (20 Feb 2024 11:00)        The 4Ms    What Matters Most: see GOC  Age appropriate Medications/Screen for High Risk Medication: Yes  Mentation: see CAM below  Mobility: defer to physical exam    The Confusion Assessment Method (CAM) Diagnostic Algorithm     1: Acute Onset or Fluctuating Course  - Is there evidence of an acute change in mental status from the patient’s baseline? Did the (abnormal) behavior  fluctuate during the day, that is, tend to come and go, or increase and decrease in severity?  [ ] YES [X ] NO     2: Inattention  - Did the patient have difficulty focusing attention, being easily distractible, or having difficulty keeping track of what was being said?  [ ] YES [X ] NO     3: Disorganized thinking  -Was the patient’s thinking disorganized or incoherent, such as rambling or irrelevant conversation, unclear or illogical flow of ideas, or unpredictable switching from subject to subject?  [ ] YES [X ] NO    4: Altered Level of consciousness?  [ ] YES [X ] NO    The diagnosis of delirium by CAM requires the presence of features 1 and 2 and either 3 or 4.    PRESSORS: [ ] YES [ X] NO  linezolid  IVPB      linezolid  IVPB 600 milliGRAM(s) IV Intermittent every 12 hours    Cardiovascular:  Heart Failure  Acute   Acute on Chronic  Chronic         Pulmonary:    Hematalogic:    Other:  acetaminophen     Tablet .. 650 milliGRAM(s) Oral every 6 hours PRN  chlorhexidine 2% Cloths 1 Application(s) Topical <User Schedule>  levothyroxine 125 MICROGram(s) Oral daily  melatonin 3 milliGRAM(s) Oral at bedtime  nystatin Powder 1 Application(s) Topical two times a day  pantoprazole  Injectable 40 milliGRAM(s) IV Push daily    acetaminophen     Tablet .. 650 milliGRAM(s) Oral every 6 hours PRN  chlorhexidine 2% Cloths 1 Application(s) Topical <User Schedule>  levothyroxine 125 MICROGram(s) Oral daily  linezolid  IVPB      linezolid  IVPB 600 milliGRAM(s) IV Intermittent every 12 hours  melatonin 3 milliGRAM(s) Oral at bedtime  nystatin Powder 1 Application(s) Topical two times a day  pantoprazole  Injectable 40 milliGRAM(s) IV Push daily    Drug Dosing Weight  Height (cm): 152.9 (22 Feb 2024 11:44)  Weight (kg): 104 (20 Feb 2024 10:36)  BMI (kg/m2): 44.5 (22 Feb 2024 11:44)  BSA (m2): 1.98 (22 Feb 2024 11:44)    CENTRAL LINE: [ ] YES [X ] NO  LOCATION:   DATE INSERTED:  REMOVE: [ ] YES [ ] NO  EXPLAIN:    PINZON: [ ] YES [X ] NO    DATE INSERTED:  REMOVE:  [ ] YES [ ] NO  EXPLAIN:    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      History of carpal tunnel release  right wrist      History of parathyroidectomy      S/P laminectomy  now bed and wheelchair ridden with severe pain                        PHYSICAL EXAM:    GENERAL: In no acute distress, obese habitus  EYES: EOMI, PERRLA  NECK: Supple, No JVD; Normal thyroid; Trachea midline  NERVOUS SYSTEM:  Alert & Oriented X 3 , knows president ,TAYLOR X 4 with generalized weakness,  follows commands    CHEST/LUNG: No rales, rhonchi, wheezing, breath sounds present bilaterally, mildly diminished to bases  saturating well on 3 L NC  HEART: Regular rate and rhythm; No murmurs, no gallops  ABDOMEN: Soft, obese, Nontender, Nondistended; Bowel sounds present  : voiding well, primafit in place  EXTREMITIES: + Pedal pulses, No clubbing, cyanosis, + non pitting Bilateral  pedal lymphedema ( chronic) , R> L   SKIN: chronic lymphedema , diffuse ecchymosis b/l upper arms , mild swelling and diffuse erythema over R UE , + anterior R leg wound - Allevyn dressing in place, local warmth - NON tender- dressing intact          LABS:  CBC Full  -  ( 25 Feb 2024 07:32 )  WBC Count : 11.80 K/uL  RBC Count : 2.83 M/uL  Hemoglobin : 8.4 g/dL  Hematocrit : 27.1 %  Platelet Count - Automated : 199 K/uL  Mean Cell Volume : 95.8 fl  Mean Cell Hemoglobin : 29.7 pg  Mean Cell Hemoglobin Concentration : 31.0 gm/dL  Auto Neutrophil # : 7.86 K/uL  Auto Lymphocyte # : 2.16 K/uL  Auto Monocyte # : 1.24 K/uL  Auto Eosinophil # : 0.13 K/uL  Auto Basophil # : 0.05 K/uL  Auto Neutrophil % : 66.6 %  Auto Lymphocyte % : 18.3 %  Auto Monocyte % : 10.5 %  Auto Eosinophil % : 1.1 %  Auto Basophil % : 0.4 %    02-24    138  |  107  |  8   ----------------------------<  132<H>  3.6   |  27  |  0.42<L>    Ca    8.1<L>      24 Feb 2024 07:39  Phos  2.0     02-24  Mg     1.6     02-24    TPro  4.8<L>  /  Alb  1.6<L>  /  TBili  0.6  /  DBili  x   /  AST  15  /  ALT  20  /  AlkPhos  84  02-24      Urinalysis Basic - ( 24 Feb 2024 07:39 )    Color: x / Appearance: x / SG: x / pH: x  Gluc: 132 mg/dL / Ketone: x  / Bili: x / Urobili: x   Blood: x / Protein: x / Nitrite: x   Leuk Esterase: x / RBC: x / WBC x   Sq Epi: x / Non Sq Epi: x / Bacteria: x            [ x ]  DVT Prophylaxis - SCD  [ X ]  Nutrition Diet, Regular:   Easy to Chew (EASYTOCHEW) (02-21-24 @ 10:10) [Active]          RADIOLOGY & ADDITIONAL STUDIES:      ACC: [id number] EXAM:  XR TIB FIB AP LAT 2 VIEWS RT   ORDERED BY: SAVANA HARRISON     PROCEDURE DATE:  02/22/2024          INTERPRETATION:  Right lower extremity  wound    2 views right tibia-fibula.    IMPRESSION: Hardware fixation as well as bonyfusion of the distal tibia   and fibula. Intact hardware.  no acute fracture or dislocation. No focal   bone destruction or unusual periosteal reaction. Partially visualized   degenerative change at the ankle. Diffuse soft tissue edema.    --- End ofReport ---    ABDOULAYE ALLRED MD; Attending Radiologist  This document has been electronically signed. Feb 23 2024  2:43PM        ACC: [id number] EXAM:  CT CHEST   ORDERED BY: JOSE COELHO     ACC: [id number] EXAM:  CT ABDOMEN AND PELVIS   ORDERED BY: JOSE COELHO     PROCEDURE DATE:  02/20/2024          INTERPRETATION:  CLINICAL INFORMATION: Sepsis    COMPARISON: Chest CT dated 01/13/2024and CT of the abdomen and pelvis   dated 06/16/2015    CONTRAST/COMPLICATIONS:  IV Contrast: NONE  Oral Contrast: NONE  Complications: None reported at time of study completion    PROCEDURE:  CT of the Chest, Abdomen and Pelvis was performed.  Sagittal and coronal reformats were performed.    FINDINGS:  CHEST:  LUNGS AND LARGE AIRWAYS: Patent central airways. Emphysema. Improved   appearance of previously noted basilar posterior left upper lobe   tree-in-bud opacities and left lower lobe consolidation. Small residual   branching opacities in the left lower lobe corresponding to location of   previously noted consolidation. Lower lobe subsegmental atelectasis. Mild   peripheral fibrotic changes. With few tiny calcified and granulomas.  PLEURA:No pleural effusion.  VESSELS: Aortic and arch vessel calcification. Enlarged main pulmonary   artery, suspect underlying pulmonary hypertension.  HEART: Cardiomegaly. No pericardial effusion. Aortic valvular   calcification.  MEDIASTINUM AND MIYA: No lymphadenopathy. Small hiatal hernia.  CHEST WALL AND LOWER NECK: Hemorrhagic changes within bilateral   pectoralis musculature and subjacent tissues.    ABDOMEN AND PELVIS:  LIVER: Within normal limits.  BILE DUCTS: Normal caliber.  GALLBLADDER: Cholecystectomy.  SPLEEN: Within normal limits.  PANCREAS: Partial fatty atrophy. Scattered calcifications, primarily in   the head.  ADRENALS: Within normal limits.  KIDNEYS/URETERS: Bilateral cysts. Mild nonobstructing nephrolithiasis,   punctate stone in each kidney.    BLADDER: Completely decompressed, coiled catheter in place.  REPRODUCTIVE ORGANS: Within normal limits. Coarse right adnexal   calcification.    BOWEL: No bowel obstruction. Appendix is normal. Scant colonic   diverticula.  PERITONEUM: No ascites.  VESSELS: Atherosclerotic changes.  RETROPERITONEUM/LYMPH NODES: No lymphadenopathy.  ABDOMINAL WALL: 7-8 cm fat-containing midline epigastric hernia and small   fat-containing umbilical hernia. Foci of hemorrhagic change within the   low ventral subcutaneous tissues and droplets of air, correlate for   history of subcutaneous injection. Associated skin thickening. Mild soft   tissue contusion in the left upper gluteal/flank region.  BONES: Acute fractures of the posterior left 11th and 12th ribs, 11th rib   fracture displaced/overlapping and 12th rib fracture nondisplaced.   Numerous bilateral chronic and subacute/chronic bilateral rib fractures.   Partially imaged cervicothoracic fusion hardware. For detailed   discussion, see CT of the cervical spine dated 02/13/2024. Degenerative   changes of the spine. Chronic right iliac bone defect presumably related   to prior bone graft procedure.    IMPRESSION:  Chest CT:  1.  Near-complete complete resolution of previously noted   pneumonia/airways disease.  2.  Acute posterior left 11th and 12th rib fractures  3.  Posttraumatic hemorrhagic changes within the chest wall musculature   and subcutaneous tissues.    Abdomen/pelvis:  1.  Foci of hemorrhagic changes within the ventral soft tissues likely   related to subcutaneous injection.  2.  Otherwise, no acute findings.    KATYA NAVARRETE MD; Attending Radiologist  This document has been electronically signed. Feb 20 2024  1:49PM    TRANSTHORACIC ECHOCARDIOGRAM REPORT     Pt. Name:       SHEKHAR VASQUEZ Study Date:    2/21/2024  MRN:            [id number]       YOB: 1950  ECHO TTE WITH CON COMP W DOPP - .m;DEFINITY ECHO                      CONTRAST PER ML - .m  Indication(s):      Cardiomyopathy, unspecified - I42.9  Procedure:          Transthoracic echocardiogram with 2-D, M-mode and complete                      spectral and color flow Doppler.       CONCLUSIONS:      1. Technically difficult image quality.   2. Left ventricular endocardium is not well visualized; however, the left ventricular systolic function appears grossly normal.   3. There is normal LV mass and concentric remodeling.   4. There is mild (grade 1) left ventricular diastolic dysfunction.  5. 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 Given the very poor image quality of this study the nature of this gradient cannot be further charachterized.   6. The right ventricle is not well visualized.        Goals of Care Discussion with Family/Proxy  - see note from 2/22/2024 ( Palliative care note)      ASSESSMENT  73 year old female AAOX3, from home, wheelchair-bound and uses cane occasionally , w/ PMHx COPD on 3 L NC at home, Obesity, Hypothyroidism, Diastolic HF on Lasix, Breast cancer s/p surgery, Asthma, Chronic lymphedema, Kidney cancer s/p partial nephrectomy discharged from [facility] on 2/16/24 for CHF exacerbation and new Afib with RVR discharged on Eliquis presenting to ED after a fall at home earlier today admitted for hypotension likely septic vs hypovolemic shock 2/2 UTI . In the ER , s/p 3 L IVF , Started on Peripheral Levo, CT scans performed in the ED suggestive of multiple acute and chronic rib fractures. Patient was transferred to ICU    2/22- PT recommended JUDITH  2/23- Hgb dropped to 6.7- s/p 1 unit PRBC  2/24- Downgraded to ICU   2/25- No acute overnight events   SHEKHAR VASQUEZ    SCU progress note    INTERVAL HPI/OVERNIGHT EVENTS: No acute overnight events. Patient seen and examined at bedside.    DNR [x ]   DNI  [x  ]- Trial of NIV    Covid - 19 PCR: COVID-19 PCR: [facility] (20 Feb 2024 11:00)        The 4Ms    What Matters Most: see GOC  Age appropriate Medications/Screen for High Risk Medication: Yes  Mentation: see CAM below  Mobility: defer to physical exam    The Confusion Assessment Method (CAM) Diagnostic Algorithm     1: Acute Onset or Fluctuating Course  - Is there evidence of an acute change in mental status from the patient’s baseline? Did the (abnormal) behavior  fluctuate during the day, that is, tend to come and go, or increase and decrease in severity?  [ ] YES [X ] NO     2: Inattention  - Did the patient have difficulty focusing attention, being easily distractible, or having difficulty keeping track of what was being said?  [ ] YES [X ] NO     3: Disorganized thinking  -Was the patient’s thinking disorganized or incoherent, such as rambling or irrelevant conversation, unclear or illogical flow of ideas, or unpredictable switching from subject to subject?  [ ] YES [X ] NO    4: Altered Level of consciousness?  [ ] YES [X ] NO    The diagnosis of delirium by CAM requires the presence of features 1 and 2 and either 3 or 4.    PRESSORS: [ ] YES [ X] NO  linezolid  IVPB      linezolid  IVPB 600 milliGRAM(s) IV Intermittent every 12 hours    Cardiovascular:  Heart Failure  Acute   Acute on Chronic  Chronic         Pulmonary:    Hematalogic:    Other:  acetaminophen     Tablet .. 650 milliGRAM(s) Oral every 6 hours PRN  chlorhexidine 2% Cloths 1 Application(s) Topical <User Schedule>  levothyroxine 125 MICROGram(s) Oral daily  melatonin 3 milliGRAM(s) Oral at bedtime  nystatin Powder 1 Application(s) Topical two times a day  pantoprazole  Injectable 40 milliGRAM(s) IV Push daily    acetaminophen     Tablet .. 650 milliGRAM(s) Oral every 6 hours PRN  chlorhexidine 2% Cloths 1 Application(s) Topical <User Schedule>  levothyroxine 125 MICROGram(s) Oral daily  linezolid  IVPB      linezolid  IVPB 600 milliGRAM(s) IV Intermittent every 12 hours  melatonin 3 milliGRAM(s) Oral at bedtime  nystatin Powder 1 Application(s) Topical two times a day  pantoprazole  Injectable 40 milliGRAM(s) IV Push daily    Drug Dosing Weight  Height (cm): 152.9 (22 Feb 2024 11:44)  Weight (kg): 104 (20 Feb 2024 10:36)  BMI (kg/m2): 44.5 (22 Feb 2024 11:44)  BSA (m2): 1.98 (22 Feb 2024 11:44)    CENTRAL LINE: [ ] YES [X ] NO  LOCATION:   DATE INSERTED:  REMOVE: [ ] YES [ ] NO  EXPLAIN:    PINZON: [ ] YES [X ] NO    DATE INSERTED:  REMOVE:  [ ] YES [ ] NO  EXPLAIN:    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      History of carpal tunnel release  right wrist      History of parathyroidectomy      S/P laminectomy  now bed and wheelchair ridden with severe pain                        PHYSICAL EXAM:    GENERAL: In no acute distress, obese habitus  EYES: EOMI, PERRLA  NECK: Supple, No JVD; Normal thyroid; Trachea midline  NERVOUS SYSTEM:  Alert & Oriented X 3 , knows president ,TAYLOR X 4 with generalized weakness,  follows commands    CHEST/LUNG: No rales, rhonchi, wheezing, breath sounds present bilaterally, mildly diminished to bases  saturating well on 3 L NC  HEART: Regular rate and rhythm; No murmurs, no gallops  ABDOMEN: Soft, obese, Nontender, Nondistended; Bowel sounds present  : voiding well, primafit in place  EXTREMITIES: + Pedal pulses, No clubbing, cyanosis, + non pitting Bilateral  pedal lymphedema ( chronic) , R> L   SKIN: chronic lymphedema , diffuse ecchymosis b/l upper arms , mild swelling and diffuse erythema over R UE , + anterior R leg wound - Allevyn dressing in place, local warmth - NON tender- dressing intact          LABS:  CBC Full  -  ( 25 Feb 2024 07:32 )  WBC Count : 11.80 K/uL  RBC Count : 2.83 M/uL  Hemoglobin : 8.4 g/dL  Hematocrit : 27.1 %  Platelet Count - Automated : 199 K/uL  Mean Cell Volume : 95.8 fl  Mean Cell Hemoglobin : 29.7 pg  Mean Cell Hemoglobin Concentration : 31.0 gm/dL  Auto Neutrophil # : 7.86 K/uL  Auto Lymphocyte # : 2.16 K/uL  Auto Monocyte # : 1.24 K/uL  Auto Eosinophil # : 0.13 K/uL  Auto Basophil # : 0.05 K/uL  Auto Neutrophil % : 66.6 %  Auto Lymphocyte % : 18.3 %  Auto Monocyte % : 10.5 %  Auto Eosinophil % : 1.1 %  Auto Basophil % : 0.4 %    02-24    138  |  107  |  8   ----------------------------<  132<H>  3.6   |  27  |  0.42<L>    Ca    8.1<L>      24 Feb 2024 07:39  Phos  2.0     02-24  Mg     1.6     02-24    TPro  4.8<L>  /  Alb  1.6<L>  /  TBili  0.6  /  DBili  x   /  AST  15  /  ALT  20  /  AlkPhos  84  02-24      Urinalysis Basic - ( 24 Feb 2024 07:39 )    Color: x / Appearance: x / SG: x / pH: x  Gluc: 132 mg/dL / Ketone: x  / Bili: x / Urobili: x   Blood: x / Protein: x / Nitrite: x   Leuk Esterase: x / RBC: x / WBC x   Sq Epi: x / Non Sq Epi: x / Bacteria: x            [ x ]  DVT Prophylaxis - SCD  [ X ]  Nutrition Diet, Regular:   Easy to Chew (EASYTOCHEW) (02-21-24 @ 10:10) [Active]          RADIOLOGY & ADDITIONAL STUDIES:      ACC: [id number] EXAM:  XR TIB FIB AP LAT 2 VIEWS RT   ORDERED BY: SAVANA HARRISON     PROCEDURE DATE:  02/22/2024          INTERPRETATION:  Right lower extremity  wound    2 views right tibia-fibula.    IMPRESSION: Hardware fixation as well as bonyfusion of the distal tibia   and fibula. Intact hardware.  no acute fracture or dislocation. No focal   bone destruction or unusual periosteal reaction. Partially visualized   degenerative change at the ankle. Diffuse soft tissue edema.    --- End ofReport ---    ABDOULAYE ALLRED MD; Attending Radiologist  This document has been electronically signed. Feb 23 2024  2:43PM        ACC: [id number] EXAM:  CT CHEST   ORDERED BY: JOSE COELHO     ACC: [id number] EXAM:  CT ABDOMEN AND PELVIS   ORDERED BY: JOSE COELHO     PROCEDURE DATE:  02/20/2024          INTERPRETATION:  CLINICAL INFORMATION: Sepsis    COMPARISON: Chest CT dated 01/13/2024and CT of the abdomen and pelvis   dated 06/16/2015    CONTRAST/COMPLICATIONS:  IV Contrast: NONE  Oral Contrast: NONE  Complications: None reported at time of study completion    PROCEDURE:  CT of the Chest, Abdomen and Pelvis was performed.  Sagittal and coronal reformats were performed.    FINDINGS:  CHEST:  LUNGS AND LARGE AIRWAYS: Patent central airways. Emphysema. Improved   appearance of previously noted basilar posterior left upper lobe   tree-in-bud opacities and left lower lobe consolidation. Small residual   branching opacities in the left lower lobe corresponding to location of   previously noted consolidation. Lower lobe subsegmental atelectasis. Mild   peripheral fibrotic changes. With few tiny calcified and granulomas.  PLEURA:No pleural effusion.  VESSELS: Aortic and arch vessel calcification. Enlarged main pulmonary   artery, suspect underlying pulmonary hypertension.  HEART: Cardiomegaly. No pericardial effusion. Aortic valvular   calcification.  MEDIASTINUM AND MIYA: No lymphadenopathy. Small hiatal hernia.  CHEST WALL AND LOWER NECK: Hemorrhagic changes within bilateral   pectoralis musculature and subjacent tissues.    ABDOMEN AND PELVIS:  LIVER: Within normal limits.  BILE DUCTS: Normal caliber.  GALLBLADDER: Cholecystectomy.  SPLEEN: Within normal limits.  PANCREAS: Partial fatty atrophy. Scattered calcifications, primarily in   the head.  ADRENALS: Within normal limits.  KIDNEYS/URETERS: Bilateral cysts. Mild nonobstructing nephrolithiasis,   punctate stone in each kidney.    BLADDER: Completely decompressed, coiled catheter in place.  REPRODUCTIVE ORGANS: Within normal limits. Coarse right adnexal   calcification.    BOWEL: No bowel obstruction. Appendix is normal. Scant colonic   diverticula.  PERITONEUM: No ascites.  VESSELS: Atherosclerotic changes.  RETROPERITONEUM/LYMPH NODES: No lymphadenopathy.  ABDOMINAL WALL: 7-8 cm fat-containing midline epigastric hernia and small   fat-containing umbilical hernia. Foci of hemorrhagic change within the   low ventral subcutaneous tissues and droplets of air, correlate for   history of subcutaneous injection. Associated skin thickening. Mild soft   tissue contusion in the left upper gluteal/flank region.  BONES: Acute fractures of the posterior left 11th and 12th ribs, 11th rib   fracture displaced/overlapping and 12th rib fracture nondisplaced.   Numerous bilateral chronic and subacute/chronic bilateral rib fractures.   Partially imaged cervicothoracic fusion hardware. For detailed   discussion, see CT of the cervical spine dated 02/13/2024. Degenerative   changes of the spine. Chronic right iliac bone defect presumably related   to prior bone graft procedure.    IMPRESSION:  Chest CT:  1.  Near-complete complete resolution of previously noted   pneumonia/airways disease.  2.  Acute posterior left 11th and 12th rib fractures  3.  Posttraumatic hemorrhagic changes within the chest wall musculature   and subcutaneous tissues.    Abdomen/pelvis:  1.  Foci of hemorrhagic changes within the ventral soft tissues likely   related to subcutaneous injection.  2.  Otherwise, no acute findings.    KATYA NAVARRETE MD; Attending Radiologist  This document has been electronically signed. Feb 20 2024  1:49PM    TRANSTHORACIC ECHOCARDIOGRAM REPORT     Pt. Name:       SHEKHAR VASQUEZ Study Date:    2/21/2024  MRN:            [id number]       YOB: 1950  ECHO TTE WITH CON COMP W DOPP - .m;DEFINITY ECHO                      CONTRAST PER ML - .m  Indication(s):      Cardiomyopathy, unspecified - I42.9  Procedure:          Transthoracic echocardiogram with 2-D, M-mode and complete                      spectral and color flow Doppler.       CONCLUSIONS:      1. Technically difficult image quality.   2. Left ventricular endocardium is not well visualized; however, the left ventricular systolic function appears grossly normal.   3. There is normal LV mass and concentric remodeling.   4. There is mild (grade 1) left ventricular diastolic dysfunction.  5. 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 Given the very poor image quality of this study the nature of this gradient cannot be further charachterized.   6. The right ventricle is not well visualized.        Goals of Care Discussion with Family/Proxy  - see note from 2/22/2024 ( Palliative care note)      ASSESSMENT  73 year old female AAOX3, from home, wheelchair-bound and uses cane occasionally , w/ PMHx COPD on 3 L NC at home, Obesity, Hypothyroidism, Diastolic HF on Lasix, Breast cancer s/p surgery, Asthma, Chronic lymphedema, Kidney cancer s/p partial nephrectomy discharged from [facility] on 2/16/24 for CHF exacerbation and new Afib with RVR discharged on Eliquis presenting to ED after a fall at home earlier today. Patient states that she was using her cane to ambulate before tripping and falling landing on her nose, resulting in a wound on her nose that started to bleed prompting her to ED. Patient denies losing consciousness or any preceding symptoms such as headaches, dizziness, and chest pain. Patient reports that she did not take her eliquis since leaving the hospital because she noticed many bruises around her body. She denies any fevers, chills, headaches, dizziness, chest pain, cough, SOB, abd pain, n/v, diarrhea, constipation, hematuria, dysuria, numbness, and weakness.   In ED VS: HR 74, BP 88/56, RR 22, SPo2 87% 4LNC, WBC 34k, Hb 7.9, Cr 2.64, Trop 137, Lactate 5.4, BNP 10k, UA +, Acute 11-12 Rib fractures Left  s/p 2g cefepime, 1 g vanc, 2L NS bolus, started on levophed Admitted to ICU  for hypotension likely septic vs hypovolemic shock 2/2 UTI   2/22- PT recommended JUDITH  2/23- Hgb dropped to 6.7- s/p 1 unit PRBC  2/24- Downgraded to ICU   2/25- No acute overnight events

## 2024-02-25 NOTE — DISCHARGE NOTE PROVIDER - NSDCQMERRANDS_GEN_ALL_CORE
Internal Medicine Daily Progress Note    Patient: Richie Bright 66 year old male Date: 4/17/2017   YOB: 1950 Attending: Anu Winter MD     Chief Complaint: fall    Subjective:   Still sore everywhere. Worried about needing to restart HD. No chest pain or shortness of breath. Made a lot of urine yesterday.     Review of Systems: A 12 point review of systems is otherwise negative    Medications :  Scheduled  • phytonadione (Vitamin K)  5 mg Oral Once   • calcium acetate gelcap  1,334 mg Oral TID WC   • furosemide  40 mg Intravenous Once   • furosemide  80 mg Oral Daily   • insulin NPH  10 Units Subcutaneous BID   • fluticasone-salmeterol  1 puff Inhalation BID Resp   • sodium chloride (PF)  2 mL Injection 2 times per day   • allopurinol  100 mg Oral Daily   • buPROPion  100 mg Oral 2 times per day   • escitalopram  20 mg Oral Daily   • folic acid  400 mcg Oral Nightly   • gabapentin  900 mg Oral Nightly   • predniSONE  10 mg Oral QAM   • rOPINIRole  0.25 mg Oral Nightly   • amphetamine-dextroamphetamine  30 mg Oral BID   • insulin lispro   Subcutaneous TID AC   • pantoprazole  40 mg Oral Nightly       PRN  dextrose, sodium chloride (PF), sodium chloride, nitroGLYcerin, sodium chloride, dextrose, dextrose, acetaminophen, docusate sodium-sennosides, bisacodyl, HYDROmorphone, ondansetron, albuterol, ammonium lactate, triamcinolone, glucagon, dextrose, traMADOL    Continuous infusion  • dextrose 5 % infusion         Allergies:  ALLERGIES:   Allergen Reactions   • Adhesive PRURITUS     Bandages. Redness. Tegaderm OK   • Contrast Media Other (See Comments)     Chills, feels bad, shaky.   • Cymbalta [Duloxetine Hcl] DIARRHEA     Heartburn.  Stomach cramps.    • Hctz [Hydrochlorothiazide]      arf   • Sulfa Drugs Cross Reactors RASH   • Penicillins      Pt. Unsure of reaction.       Physical Examination :  Vital 24 Hour Range Most Recent Value   Temperature Temp  Min: 97.5 °F (36.4 °C)  Max: 97.5 °F (36.4  °C) 97.5 °F (36.4 °C)   Pulse Pulse  Min: 72  Max: 74 74   Respiratory Resp  Min: 16  Max: 20 16   Blood Pressure BP  Min: 122/68  Max: 128/56 122/68   Pulse Oximetry SpO2  Min: 93 %  Max: 95 %    O2 No Data Recorded    IGEN: NAD  HEENT: pressure dressings on forehead, dressing over nose. right eye medial subconjunctival hemorrhage, there are ecchymosis around both eyes, MMM  SKIN: multiple ecchymosis over his arms and legs, hematomas to bilateral knees and right center forehead, venous stasis changes to bilateral lower extremities, abrasion to nasal bridge  LUNGS: CTAB  HEART: RRR, nl s1 s2, no r/m/g  ABDOMEN: NABS, NTND, no HSM  NEUROLOGIC: A&OX3, CN II-XII intact. Moving all extremities. No difficulty with word recall today.   EXTREMITIES: no clubbing, no cyanosis, no edema, LUE AVF with thrill        Laboratory Results:  Recent Labs      04/14/17   1105  04/15/17   0330   04/16/17   0350  04/16/17   1520  04/17/17   0330   WBC  12.4*  10.0   --   10.3   --   9.5   RBC  3.45*  3.39*   --   3.17*   --   3.14*   HGB  11.2*  10.5*   --   9.8*   --   9.9*   HCT  33.7*  33.2*   --   30.5*   --   30.0*   PLT  284  277   --   242   --   256   PTT  46*   --    --   63*   --   59*   PT  42.6*  41.4*   --   37.0*   --   38.5*   INR  4.0  3.8   --   3.5   --   3.6   SODIUM  137  138   --   133*   --   137   POTASSIUM  4.8  5.9*   < >  5.6*  5.0  4.4   CHLORIDE  102  105   --   98   --   100   CO2  22  23   --   20*   --   26   BUN  98*  109*   --   123*   --   119*   CREATININE  5.39*  6.05*   --   6.64*   --   6.33*   GFRNA  10  9   --   8   --   8   GLUCOSE  159*  62*   --   189*   --   159*   CALCIUM  8.1*  8.2*   --   8.1*   --   8.1*   MG   --   2.8*   --   2.8*   --   2.7*   PHOS   --    --    --   10.1*   --   10.3*   AST   --   13   --    --    --    --    GPT   --   23   --    --    --    --    ALKPT   --   60   --    --    --    --    BILIRUBIN   --   0.3   --    --    --    --    ALBUMIN   --   3.0*   --    --     --    --     < > = values in this interval not displayed.       Imaging Results Reviewed:  Ct Head Brain    Result Date: 4/14/2017  EXAM: CT HEAD WO CONTRAST CLINICAL HISTORY: HEAD INJURY MILD OR MODERATE ACUTE, NO NEUROLOGICAL DEFICIT TECHNIQUE: Helical imaging through the brain was performed without IV contrast. COMPARISON: July 31, 2014. FINDINGS: There are no abnormal intra or extra-axial fluid collections. There is no intracranial hemorrhage. No mass or mass effect is identified. Mild ill-defined low-attenuation in the periventricular distribution is consistent with chronic small vessel ischemic disease. Age-appropriate atrophy is noted. A moderate right frontal scalp hematoma is noted.     IMPRESSION: No acute intracranial abnormality. Chronic small vessel ischemic disease and age-appropriate atrophy. Right frontal scalp hematoma.     Xr Chest Ap Or Pa - Portable    Result Date: 4/14/2017  EXAM: XR CHEST AP OR PA CLINICAL HISTORY: trauma TECHNIQUE: Portable chest. COMPARISON: Chest CT from April 6, 2017 and chest x-ray from April 5, 2017. FINDINGS: The heart is enlarged but stable in size. Pulmonary vessels are normal. Patchy opacities are present in the right mid and lower lung similar to the previous study. The right upper lung and left lung are otherwise clear.     IMPRESSION: No significant change in right mid and lower lung patchy opacities likely representing pleural fluid with associated atelectasis or infiltrate.     Xr Knee 3 View Right    Result Date: 4/14/2017  EXAM: XR KNEE 3 VW RIGHT CLINICAL HISTORY: trauma TECHNIQUE: Three-view right knee. COMPARISON: February 23, 2014. FINDINGS: A total knee prosthesis is in place with prosthetic components in expected position and alignment. There are no suspicious lucencies at the bone prosthesis interface. No evidence of acute osseous or hardware abnormality evidence of acute osseous abnormality.     IMPRESSION: No acute osseous or hardware abnormality.      Xr Knee 3 View Left    Result Date: 4/14/2017  EXAM: XR KNEE 3 VW LEFT CLINICAL HISTORY: trauma TECHNIQUE: 3 view left knee. COMPARISON: February 15, 2016. FINDINGS: A total knee prosthesis is in place with prosthetic components in expected position and alignment. There are no suspicious lucencies at the bone prosthesis interface. No evidence of acute osseous or hardware abnormality.     IMPRESSION: No acute osseous or hardware abnormality.     Renal US Impression:   No evidence of hydronephrosis.     Assessment and Plan:  Acute  # Mechanical fall with multiple abrasions/hematomas. PT/OT consulted. OT recommends home with therapy. PT recs pending. B12, Vitamin D and TSH wnl. Surgery consulted for tertiary trauma survey. Continue tylenol and tramadol for pain. PT recommends post-acute rehab. IPR consulted.   # Concussion. He was having some word recall difficulties after hitting his head, seems to be improving  # Orthostatic hypotension. PT/OT working with him. Holding Amlodipine.   # Supratherapeutic INR. Trending down. INR trending up despite holding coumadin. Will give some vitamin K orally.   # MARCOS on CKD stage V. Baseline Cr 3.5-5. Nephrology consulted. Lasix switched back to PO. Cr stabilized and K wnl. Will discuss HD need with Dr. Menendez  # Hyperkalemia. Was up to 7 this admission with out EKG changes. Within normal range now.   # Hyperphosphatemia. Renal following  # Goals of care. Patient is DNR/DNI    Chronic   # HTN. Lasix. Holding Amlodipine.   # Paroxysmal A. Fib/Recent DVT PE. Coumadin as above.   # DM II with neuropathy. FSG low on 4/15 morning to the 50-60s. Continued decreased dose of NPH. Continue ISS.   # Depression Anxiety. On Lexapro, Wellbutrin, Adderall.   # Gout. On Allopurinol   # COPD. On prednisone, switch Ellipta to Advair. Continue Albuterol PRN.     DVT/VTE Prophylaxis:  VTE Pharmacologic Prophylaxis: Yes    Code Status: No Resuscitation with Maximal Medical  Support    Discharge:  JULIA Expected Discharge Date: 04/17/17  Barriers: dialysis need?  Destination: Rehab    Anu Winter MD  [facility] Hospitalist  Pager [phone]  Please contact the attending Hospitalist from 7AM until 7pm.   From 7pm to 7am please contact the Hospitalist on call     Yes

## 2024-02-25 NOTE — DISCHARGE NOTE PROVIDER - NSDCMRMEDTOKEN_GEN_ALL_CORE_FT
amLODIPine 5 mg oral tablet: 1 tab(s) orally once a day  apixaban 5 mg oral tablet: 1 tab(s) orally 2 times a day  aspirin 81 mg oral delayed release tablet: 1 tab(s) orally once a day  atorvastatin 40 mg oral tablet: 1 tab(s) orally once a day (at bedtime)  budesonide 0.5 mg/2 mL inhalation suspension: 2 milliliter(s) by nebulizer 2 times a day  busPIRone 10 mg oral tablet: 2 tab(s) orally once a day  DULoxetine 60 mg oral delayed release capsule: 1 cap(s) orally once a day  furosemide 20 mg oral tablet: 1 tab(s) orally once a day  gabapentin 100 mg oral capsule: 1 cap(s) orally 2 times a day  levothyroxine 125 mcg (0.125 mg) oral tablet: 1 tab(s) orally once a day  metoprolol tartrate 25 mg oral tablet: 1 tab(s) orally 2 times a day  montelukast 10 mg oral tablet: 1 tab(s) orally once a day  Multiple Vitamins oral tablet: 1 tab(s) orally once a day  OLANZapine 10 mg oral tablet: 1 tab(s) orally once a day (at bedtime)  Spiriva HandiHaler 18 mcg inhalation capsule: 1 cap(s) inhaled once a day   amLODIPine 5 mg oral tablet: 1 tab(s) orally once a day  apixaban 5 mg oral tablet: 1 tab(s) orally 2 times a day  aspirin 81 mg oral delayed release tablet: 1 tab(s) orally once a day  atorvastatin 40 mg oral tablet: 1 tab(s) orally once a day (at bedtime)  budesonide 0.5 mg/2 mL inhalation suspension: 2 milliliter(s) by nebulizer 2 times a day  busPIRone 10 mg oral tablet: 2 tab(s) orally once a day  DULoxetine 60 mg oral delayed release capsule: 1 cap(s) orally once a day  furosemide 20 mg oral tablet: 1 tab(s) orally once a day  gabapentin 100 mg oral capsule: 1 cap(s) orally 2 times a day  levothyroxine 125 mcg (0.125 mg) oral tablet: 1 tab(s) orally once a day  linezolid 600 mg oral tablet: 1 tab(s) orally every 12 hours MDD: 1200 mg  metoprolol tartrate 25 mg oral tablet: 1 tab(s) orally 2 times a day  montelukast 10 mg oral tablet: 1 tab(s) orally once a day  Multiple Vitamins oral tablet: 1 tab(s) orally once a day  nystatin 100,000 units/g topical powder: 1 Apply topically to affected area 2 times a day  OLANZapine 10 mg oral tablet: 1 tab(s) orally once a day (at bedtime)  pantoprazole 40 mg intravenous injection: 40 milligram(s) intravenous once a day  Spiriva HandiHaler 18 mcg inhalation capsule: 1 cap(s) inhaled once a day  zinc oxide 20% topical ointment: 1 Apply topically to affected area 2 times a day

## 2024-02-25 NOTE — DISCHARGE NOTE PROVIDER - NSDCFUADDAPPT_GEN_ALL_CORE_FT
patient to make appointment at Dr. Omalley's clinic, located at [phone] [place] [address]. Please contact the clinic at [phone].     HOLD BUSPAR AND DULOXETINE WHILE ON LINEZOLID (RISK FOR SEROTONIN SYNDROME). PLEASE VERIFY WITH FACILITY PCP.

## 2024-02-25 NOTE — DISCHARGE NOTE PROVIDER - NSDCCPCAREPLAN_GEN_ALL_CORE_FT
PRINCIPAL DISCHARGE DIAGNOSIS  Diagnosis: Sepsis due to urinary tract infection  Assessment and Plan of Treatment: You came to the ER after a fall . In the ER your blood pressure was low and you were given IV fuids and was started on medication to keep your BP elevated. Your urinalysis was positive for urinary tract infection and was treated with Antibiotics. Eventually you  were weaned off of medication support to keep your BP high and you were downgraded to SCU for further monitoring. Most likeley your low BP was due to the urinary tract infection.   Take all antibiotics as ordered.  Call you Health care provider upon arrival home to make a one week follow up appointment.  If you develop fever, chills, malaise, or change in mental status call your Health Care Provider or go to the Emergency Department.  Nutrition is important, eat small frequent meals to help ensure you get adequate calories.  Do not stay in bed all day!  Increase your activity daily as tolerated.        SECONDARY DISCHARGE DIAGNOSES  Diagnosis: Acute UTI  Assessment and Plan of Treatment: You came to the ER after a fall . In the ER you were noted to be hypotensive, urianlysis positive for UTI - Urine culture positive for Enterococcus fecalis. ID was consulted and you were started on Linezolid.  HOME CARE INSTRUCTIONS  f you were prescribed antibiotics, take them exactly as your caregiver instructs you. Finish the medication even if you feel better after you have only taken some of the medication.  Drink enough water and fluids to keep your urine clear or pale yellow.  Avoid caffeine, tea, and carbonated beverages. They tend to irritate your bladder.  Empty your bladder often. Avoid holding urine for long periods of time.  Empty your bladder before and after sexual intercourse.  After a bowel movement, women should cleanse from front to back. Use each tissue only once.  SEEK MEDICAL CARE IF:  You have back pain.  You develop a fever.  Your symptoms do not begin to resolve within 3 days.  SEEK IMMEDIATE MEDICAL CARE IF:  You have severe back pain or lower abdominal pain.  You develop chills.  You have nausea or vomiting.  You have continued burning or discomfort with urination.       PRINCIPAL DISCHARGE DIAGNOSIS  Diagnosis: Sepsis due to urinary tract infection  Assessment and Plan of Treatment: You came to the ER after a fall . In the ER your blood pressure was low and you were given IV fuids and was started on medication to keep your BP elevated. Your urinalysis was positive for urinary tract infection and was treated with Antibiotics. Eventually you  were weaned off of medication support to keep your BP high and you were downgraded to SCU for further monitoring. Most likeley your low BP was due to the urinary tract infection.   Take all antibiotics as ordered.  Call you Health care provider upon arrival home to make a one week follow up appointment.  If you develop fever, chills, malaise, or change in mental status call your Health Care Provider or go to the Emergency Department.  Nutrition is important, eat small frequent meals to help ensure you get adequate calories.  Do not stay in bed all day!  Increase your activity daily as tolerated.        SECONDARY DISCHARGE DIAGNOSES  Diagnosis: Acute UTI  Assessment and Plan of Treatment: You came to the ER after a fall . In the ER you were noted to be hypotensive, urianlysis positive for UTI - Urine culture positive for Enterococcus fecalis. ID was consulted and you were started on Linezolid.  HOME CARE INSTRUCTIONS  f you were prescribed antibiotics, take them exactly as your caregiver instructs you. Finish the medication even if you feel better after you have only taken some of the medication.  Drink enough water and fluids to keep your urine clear or pale yellow.  Avoid caffeine, tea, and carbonated beverages. They tend to irritate your bladder.  Empty your bladder often. Avoid holding urine for long periods of time.  Empty your bladder before and after sexual intercourse.  After a bowel movement, women should cleanse from front to back. Use each tissue only once.  SEEK MEDICAL CARE IF:  You have back pain.  You develop a fever.  Your symptoms do not begin to resolve within 3 days.  SEEK IMMEDIATE MEDICAL CARE IF:  You have severe back pain or lower abdominal pain.  You develop chills.  You have nausea or vomiting.  You have continued burning or discomfort with urination.      Diagnosis: Rib fracture  Assessment and Plan of Treatment: You came to the ER after a fall. CT chest showed Acute posterior left 11th and 12th rib fractures.   A broken rib is a crack or break in one of the bones of the rib cage. Breathing can be painful because the muscles used for breathing pull on the rib. In most cases , a broken rib will heal on its own . You could take Tylenol if you are in pain. Normally a broken rib will heal in about 6 weeks  Even if it hurts, please make sure to take deep breathing at least once every hour.  Seek medical attention if you have trouble breathing , have a fever, new or worsening cough.  Follow up with your PCP in a week       PRINCIPAL DISCHARGE DIAGNOSIS  Diagnosis: Sepsis due to urinary tract infection  Assessment and Plan of Treatment: You came to the ER after a fall . In the ER your blood pressure was low and you were given IV fuids and was started on medication to keep your BP elevated. Your urinalysis was positive for urinary tract infection and was treated with Antibiotics. Eventually you  were weaned off of medication support to keep your BP high and you were downgraded to SCU for further monitoring. Most likeley your low BP was due to the urinary tract infection.   Take all antibiotics as ordered.  Call you Health care provider upon arrival home to make a one week follow up appointment.  If you develop fever, chills, malaise, or change in mental status call your Health Care Provider or go to the Emergency Department.  Nutrition is important, eat small frequent meals to help ensure you get adequate calories.  Do not stay in bed all day!  Increase your activity daily as tolerated.        SECONDARY DISCHARGE DIAGNOSES  Diagnosis: Acute UTI  Assessment and Plan of Treatment: You came to the ER after a fall . In the ER you were noted to be hypotensive, urianlysis positive for UTI - Urine culture positive for Enterococcus fecalis. ID was consulted and you were started on Linezolid to be taken until 3/2.   HOME CARE INSTRUCTIONS  f you were prescribed antibiotics, take them exactly as your caregiver instructs you. Finish the medication even if you feel better after you have only taken some of the medication.  Drink enough water and fluids to keep your urine clear or pale yellow.  Avoid caffeine, tea, and carbonated beverages. They tend to irritate your bladder.  Empty your bladder often. Avoid holding urine for long periods of time.  Empty your bladder before and after sexual intercourse.  After a bowel movement, women should cleanse from front to back. Use each tissue only once.  SEEK MEDICAL CARE IF:  You have back pain.  You develop a fever.  Your symptoms do not begin to resolve within 3 days.  SEEK IMMEDIATE MEDICAL CARE IF:  You have severe back pain or lower abdominal pain.  You develop chills.  You have nausea or vomiting.  You have continued burning or discomfort with urination.      Diagnosis: Rib fracture  Assessment and Plan of Treatment: You came to the ER after a fall. CT chest showed Acute posterior left 11th and 12th rib fractures.   A broken rib is a crack or break in one of the bones of the rib cage. Breathing can be painful because the muscles used for breathing pull on the rib. In most cases , a broken rib will heal on its own . You could take Tylenol if you are in pain. Normally a broken rib will heal in about 6 weeks  Even if it hurts, please make sure to take deep breathing at least once every hour.  Seek medical attention if you have trouble breathing , have a fever, new or worsening cough.  Follow up with your PCP in a week

## 2024-02-25 NOTE — PROGRESS NOTE ADULT - PROBLEM SELECTOR PLAN 3
Patient s/p fall  CT chest showing Acute posterior left 11th and 12th rib fractures  Tylenol PRN for pain

## 2024-02-25 NOTE — PROGRESS NOTE ADULT - PROBLEM SELECTOR PLAN 11
c/w Linezolid   c/w Lasix   Elevated phosphorus - 5.2 - Continue monitoring for now  c/w PT - May benefit from JUDITH c/w Linezolid   c/w Lasix   Phos and mg replaced today - monitor level tomorrow  c/w PT - May benefit from JUDITH

## 2024-02-25 NOTE — DISCHARGE NOTE PROVIDER - PROVIDER TOKENS
PROVIDER:[TOKEN:[[id number]],FOLLOWUP:[1 week],ESTABLISHEDPATIENT:[T]] PROVIDER:[TOKEN:[[id number]],FOLLOWUP:[1 week],ESTABLISHEDPATIENT:[T]],PROVIDER:[TOKEN:[[id number]],FOLLOWUP:[1 week]]

## 2024-02-25 NOTE — PROGRESS NOTE ADULT - PROBLEM SELECTOR PLAN 8
h/o CHF   BNP 10 k on admission  TTE report as above  Will resume lasix -   Start on Lasix 20 mg IV daily   1.5 L fluid restriction

## 2024-02-25 NOTE — DISCHARGE NOTE PROVIDER - HOSPITAL COURSE
73 year old female AAOX3, from home, wheelchair-bound and uses cane occasionally , w/ PMHx COPD on 3 L NC at home, Obesity, Hypothyroidism, Diastolic HF on Lasix, Breast cancer s/p surgery, Asthma, Chronic lymphedema, Kidney cancer s/p partial nephrectomy discharged from [facility] on 2/16/24 for CHF exacerbation and new Afib with RVR discharged on Eliquis presenting to ED after a fall at home earlier today. Patient states that she was using her cane to ambulate before tripping and falling landing on her nose, resulting in a wound on her nose that started to bleed prompting her to ED. Patient denies losing consciousness or any preceding symptoms such as headaches, dizziness, and chest pain. Patient reports that she did not take her eliquis since leaving the hospital because she noticed many bruises around her body. She denies any fevers, chills, headaches, dizziness, chest pain, cough, SOB, abd pain, n/v, diarrhea, constipation, hematuria, dysuria, numbness, and weakness.   In ED VS: HR 74, BP 88/56, RR 22, SPo2 87% 4LNC, WBC 34k, Hb 7.9, Cr 2.64, Trop 137, Lactate 5.4, BNP 10k, UA +, Acute 11-12 Rib fractures Left  s/p 2g cefepime, 1 g vanc, 2L NS bolus, started on levophed. Admitted to ICU  for hypotension likely septic vs hypovolemic shock 2/2 UTI . Patient was eventually weaned off of pressors and got downgraded to SCU for further monitoring. Hospital course complicated with drop in Hgb to 6.7 requiring 1 unit of PRBC transfusion Patient was evaluated by PT and was recommended JUDITH.   INCOMPLETE  Given patient's improved clinical status and current hemodynamic stability, decision was made to discharge.  Please refer to patient's complete medical chart with documents for a full hospital course, for this is only a brief summary.     73 year old female AAOX3, from home, wheelchair-bound and uses cane occasionally , w/ PMHx COPD on 3 L NC at home, Obesity, Hypothyroidism, Diastolic HF on Lasix, Breast cancer s/p surgery, Asthma, Chronic lymphedema, Kidney cancer s/p partial nephrectomy discharged from [facility] on 2/16/24 for CHF exacerbation and new Afib with RVR discharged on Eliquis presenting to ED after a fall at home earlier today. Patient states that she was using her cane to ambulate before tripping and falling landing on her nose, resulting in a wound on her nose that started to bleed prompting her to ED. Patient denies losing consciousness or any preceding symptoms such as headaches, dizziness, and chest pain. Patient reports that she did not take her eliquis since leaving the hospital because she noticed many bruises around her body. She denies any fevers, chills, headaches, dizziness, chest pain, cough, SOB, abd pain, n/v, diarrhea, constipation, hematuria, dysuria, numbness, and weakness.   In ED VS: HR 74, BP 88/56, RR 22, SPo2 87% 4LNC, WBC 34k, Hb 7.9, Cr 2.64, Trop 137, Lactate 5.4, BNP 10k, UA +, Acute 11-12 Rib fractures Left  s/p 2g cefepime, 1 g vanc, 2L NS bolus, started on levophed. Admitted to ICU  for hypotension likely septic vs hypovolemic shock 2/2 UTI . Patient was eventually weaned off of pressors and got downgraded to SCU for further monitoring. Urine culture positive for Enterococcus fecalis - ID consulted , treatment with Linezolid. Hospital course complicated with drop in Hgb to 6.7 requiring blood transfusion. Patient was evaluated by PT and was recommended JUDITH.   INCOMPLETE  Given patient's improved clinical status and current hemodynamic stability, decision was made to discharge.  Please refer to patient's complete medical chart with documents for a full hospital course, for this is only a brief summary.     73 year old female AAOX3, from home, wheelchair-bound and uses cane occasionally , w/ PMHx COPD on 3 L NC at home, Obesity, Hypothyroidism, Diastolic HF on Lasix, Breast cancer s/p surgery, Asthma, Chronic lymphedema, Kidney cancer s/p partial nephrectomy discharged from [facility] on 2/16/24 for CHF exacerbation and new Afib with RVR discharged on Eliquis presenting to ED after a fall at home earlier today. Patient states that she was using her cane to ambulate before tripping and falling landing on her nose, resulting in a wound on her nose that started to bleed prompting her to ED. Patient denies losing consciousness or any preceding symptoms such as headaches, dizziness, and chest pain. Patient reports that she did not take her eliquis since leaving the hospital because she noticed many bruises around her body. She denies any fevers, chills, headaches, dizziness, chest pain, cough, SOB, abd pain, n/v, diarrhea, constipation, hematuria, dysuria, numbness, and weakness.     In ED VS: HR 74, BP 88/56, RR 22, SPo2 87% 4LNC, WBC 34k, Hb 7.9, Cr 2.64, Trop 137, Lactate 5.4, BNP 10k, UA +, Acute 11-12 Rib fractures Left  s/p 2g cefepime, 1 g vanc, 2L NS bolus, started on levophed. Admitted to ICU  for hypotension likely septic vs hypovolemic shock 2/2 UTI . Patient was eventually weaned off of pressors and got downgraded to SCU for further monitoring. Urine culture positive for Enterococcus fecalis - ID consulted , treatment with Linezolid. Hospital course complicated with drop in Hgb to 6.7 requiring blood transfusion. Patient hgb today is 9.0 and stable. Patient was evaluated by PT and was recommended JUDITH. CM following and patient being discharged to [facility].       Given patient's improved clinical status and current hemodynamic stability, decision was made to discharge.  Please refer to patient's complete medical chart with documents for a full hospital course, for this is only a brief summary.     73 year old female AAOX3, from home, wheelchair-bound and uses cane occasionally , w/ PMHx COPD on 3 L NC at home, Obesity, Hypothyroidism, Diastolic HF on Lasix, Breast cancer s/p surgery, Asthma, Chronic lymphedema, Kidney cancer s/p partial nephrectomy discharged from [facility] on 2/16/24 for CHF exacerbation and new Afib with RVR discharged on Eliquis presenting to ED after a fall at home earlier today. Patient states that she was using her cane to ambulate before tripping and falling landing on her nose, resulting in a wound on her nose that started to bleed prompting her to ED. Patient denies losing consciousness or any preceding symptoms such as headaches, dizziness, and chest pain. Patient reports that she did not take her eliquis since leaving the hospital because she noticed many bruises around her body. She denies any fevers, chills, headaches, dizziness, chest pain, cough, SOB, abd pain, n/v, diarrhea, constipation, hematuria, dysuria, numbness, and weakness.     In ED VS: HR 74, BP 88/56, RR 22, SPo2 87% 4LNC, WBC 34k, Hb 7.9, Cr 2.64, Trop 137, Lactate 5.4, BNP 10k, UA +, Acute 11-12 Rib fractures Left  s/p 2g cefepime, 1 g vanc, 2L NS bolus, started on levophed. Admitted to ICU  for hypotension likely septic vs hypovolemic shock 2/2 UTI . Patient was eventually weaned off of pressors and got downgraded to SCU for further monitoring. Urine culture positive for Enterococcus fecalis - ID consulted , treatment with Linezolid. Hospital course complicated with drop in Hgb to 6.7 requiring blood transfusion. Patient hgb today is 9.0 and stable. Patient was evaluated by PT and was recommended JUDITH. CM following and patient being discharged to [facility].   s/p 2g cefepime, 1 g vanc, 2L NS bolus, started on levophed Admitted to ICU  for hypotension 2/2 septic vs hypovolemic shock 2/2 UTI.   PT recommended JUDITH. Hgb dropped to 6.7- s/p 1 unit PRBC  Subsequently Downgraded to SCU.  PT evaluated and reccs JUDITH. Resumed Eliquis.   Hemodynamically stable. On 3L O2 and tolerating. On Zyvox until 03/01/2024. Plan to discharge to [facility] today at 11 AM.       Given patient's improved clinical status and current hemodynamic stability, discussed with intensivist and decision was made to discharge.  Please refer to patient's complete medical chart with documents for a full hospital course, for this is only a brief summary.     73 year old female AAOX3, from home, wheelchair-bound and uses cane occasionally , w/ PMHx COPD on 3 L NC at home, Obesity, Hypothyroidism, Diastolic HF on Lasix, Breast cancer s/p surgery, Asthma, Chronic lymphedema, Kidney cancer s/p partial nephrectomy discharged from [facility] on 2/16/24 for CHF exacerbation and new Afib with RVR discharged on Eliquis presenting to ED after a fall at home earlier today. Patient states that she was using her cane to ambulate before tripping and falling landing on her nose, resulting in a wound on her nose that started to bleed prompting her to ED. Patient denies losing consciousness or any preceding symptoms such as headaches, dizziness, and chest pain. Patient reports that she did not take her eliquis since leaving the hospital because she noticed many bruises around her body. She denies any fevers, chills, headaches, dizziness, chest pain, cough, SOB, abd pain, n/v, diarrhea, constipation, hematuria, dysuria, numbness, and weakness.     In ED VS: HR 74, BP 88/56, RR 22, SPo2 87% 4LNC, WBC 34k, Hb 7.9, Cr 2.64, Trop 137, Lactate 5.4, BNP 10k, UA +, Acute 11-12 Rib fractures Left  s/p 2g cefepime, 1 g vanc, 2L NS bolus, started on levophed. Admitted to ICU  for hypotension likely septic vs hypovolemic shock 2/2 UTI . Patient was eventually weaned off of pressors and got downgraded to SCU for further monitoring. Urine culture positive for Enterococcus fecalis - ID consulted , treatment with Linezolid. Hospital course complicated with drop in Hgb to 6.7 requiring blood transfusion. Patient hgb today is 9.0 and stable. Patient was evaluated by PT and was recommended JUDITH. CM following and patient being discharged to [facility].   s/p 2g cefepime, 1 g vanc, 2L NS bolus, started on levophed Admitted to ICU  for hypotension 2/2 septic vs hypovolemic shock 2/2 UTI.   PT recommended JUDITH. Hgb dropped to 6.7- s/p 1 unit PRBC  Subsequently Downgraded to SCU.  PT evaluated and reccs JUDITH. Resumed Eliquis.   Hemodynamically stable. On 3L O2 and tolerating. On Zyvox until 03/01/2024. Plan to discharge to [facility] today at 11 AM.       Given patient's improved clinical status and current hemodynamic stability, discussed with intensivist and decision was made to discharge.  Please refer to patient's complete medical chart with documents for a full hospital course, for this is only a brief summary.    for a full account of hospital course please refer to actual medical records for this is a brief summery

## 2024-02-25 NOTE — PROGRESS NOTE ADULT - PROBLEM SELECTOR PLAN 4
Hgb 6.7 on 2/23- given 1 unit of PRBC  No GI/  symptoms  Hgb today 8.4  Continue trending CBC   Transfuse if Hgb <7

## 2024-02-25 NOTE — PROGRESS NOTE ADULT - ASSESSMENT
_________________________________________________________________________________________  ========>>  M E D I C A L   A T T E N D I N G    F O L L O W  U P  N O T E  <<=========  -----------------------------------------------------------------------------------------------------    - Patient seen and examined by me earlier today.   - Patient has been out of the MICU, to step down... today overall doing better , comfortable, eating OK. mentating ok      patient post PRBC otherwise overall improved .. patient being restarted on diuretics for worsening edema..     ==================>> REVIEW OF SYSTEM <<=================    GEN: no fever, no chills, no pain  RESP: no SOB, no cough, no sputum  CVS: no chest pain, no palpitations  GI: no abdominal pain, no nausea  : no dysuria, no frequency  Neuro: no headache, no dizziness    ==================>> PHYSICAL EXAM <<=================    GEN: A&O X 2-3 , NAD , comfortable, pleasant, calm   HEENT: NCAT, PERRL, MMM, hearing intact  CVS: S1S2 , regular , No M/R/G appreciated  PULM: CTA B/L,  limited exam due to body habitus.   ABD.: soft. non tender, non distended,  obese   Extrem: intact pulses , chronic LE lymphedema >> worsened: left >Rt .. scattered bruises / ecchymosis       ( Note written / Date of service 02-25-24 ( This is certified to be the same as "ENTERED" date above ( for billing purposes)))    ==================>> MEDICATIONS <<====================    chlorhexidine 2% Cloths 1 Application(s) Topical <User Schedule>  furosemide   Injectable 20 milliGRAM(s) IV Push daily  heparin   Injectable 5000 Unit(s) SubCutaneous every 12 hours  levothyroxine 125 MICROGram(s) Oral daily  linezolid  IVPB      linezolid  IVPB 600 milliGRAM(s) IV Intermittent every 12 hours  melatonin 3 milliGRAM(s) Oral at bedtime  nystatin Powder 1 Application(s) Topical two times a day  pantoprazole  Injectable 40 milliGRAM(s) IV Push daily    MEDICATIONS  (PRN):  acetaminophen     Tablet .. 650 milliGRAM(s) Oral every 6 hours PRN Temp greater or equal to 38C (100.4F), Mild Pain (1 - 3)    ___________  Active diet:  Diet, DASH/TLC:   Sodium & Cholesterol Restricted  Easy to Chew (EASYTOCHEW)  1500mL Fluid Restriction ([id number])  ___________________    ==================>> VITAL SIGNS <<==================    Height (cm): 152.9  Vital Signs Last 24 HrsT(C): 36.4 (02-25-24 @ 05:00)  T(F): 97.6 (02-25-24 @ 05:00), Max: 98.6 (02-24-24 @ 13:18)  HR: 73 (02-25-24 @ 05:00) (73 - 95)  BP: 151/70 (02-25-24 @ 05:00)  RR: 19 (02-25-24 @ 05:00) (19 - 20)  SpO2: 100% (02-25-24 @ 05:00) (97% - 100%)       ==================>> LAB AND IMAGING <<==================                        8.4    11.80 )-----------( 199      ( 25 Feb 2024 07:32 )             27.1        02-25    137  |  107  |  6<L>  ----------------------------<  102<H>  3.9   |  25  |  0.51    Ca    8.5      25 Feb 2024 07:32  Phos  1.9     02-25  Mg     1.4     02-25    TPro  5.4<L>  /  Alb  1.7<L>  /  TBili  0.5  /  DBili  x   /  AST  12  /  ALT  20  /  AlkPhos  94  02-25    WBC count:   11.80 <<== ,  12.69 <<== ,  14.26 <<== ,  14.22 <<== ,  19.17 <<== ,  22.35 <<==   Hemoglobin:   8.4 <<==,  7.9 <<==,  6.7 <<==,  6.9 <<==,  7.0 <<==,  7.3 <<==  platelets:  199 <==, 214 <==, 205 <==, 213 <==, 208 <==, 238 <==, 215 <==    Creatinine:  0.51  <<==, 0.42  <<==, 0.62  <<==, 0.96  <<==, 1.56  <<==, 2.29  <<==  Sodium:   137  <==, 138  <==, 140  <==, 135  <==, 136  <==, 136  <==, 135  <==       AST:          12(02-25) <== , 15(02-24) <== , 18(02-23) <== , 22(02-22) <== , 61(02-21) <==      ALT:        20(02-25)  <== , 20(02-24)  <== , 23(02-23)  <== , 28(02-22)  <== , 35(02-21)  <==      AP:        94(02-25)  <=, 84(02-24)  <=, 90(02-23)  <=, 94(02-22)  <=, 88(02-21)  <=     Bili:        0.5(02-25)  <=, 0.6(02-24)  <=, 0.6(02-23)  <=, 0.7(02-22)  <=, 0.9(02-21)  <=    ____________________________    M I C R O B I O L O G Y :    Culture - Urine (collected 20 Feb 2024 14:25)  Source: Clean Catch Clean Catch (Midstream)  Final Report (23 Feb 2024 14:20):    >100,000 CFU/ml Enterococcus faecalis    >100,000 CFU/ml Enterococcus faecium (vancomycin resistant)  Organism: Enterococcus faecalis  Enterococcus faecium (vancomycin resistant) (23 Feb 2024 14:20)  Organism: Enterococcus faecium (vancomycin resistant) (23 Feb 2024 14:20)    Sensitivities:      -  Levofloxacin: R >4      -  Nitrofurantoin: R >64 Should not be used to treat pyelonephritis.      -  Daptomycin: SDD 2 The breakpoint for SDD (susceptible dose dependent)is based on a dosage regimen of 8-12 mg/kg administered every 24 h in adults and is intended for serious infections due to E. faecium. Consultation with an infectious diseases specialist is recommended.      -  Linezolid: S 1      -  Vancomycin: R >16      -  Ciprofloxacin: R >2      -  Ampicillin: R >8 Predicts results to ampicillin/sulbactam, amoxacillin-clavulanate and  piperacillin-tazobactam.      -  Tetracycline: R >8      Method Type: MARIA M  Organism: Enterococcus faecalis (23 Feb 2024 14:20)    Sensitivities:      -  Levofloxacin: S <=0.5      -  Nitrofurantoin: S <=32 Should not be used to treat pyelonephritis.      -  Vancomycin: S 2      -  Ciprofloxacin: S <=1      -  Ampicillin: S <=2 Predicts results to ampicillin/sulbactam, amoxacillin-clavulanate and  piperacillin-tazobactam.      -  Tetracycline: R >8      Method Type: MARIA M    Culture - Blood (collected 20 Feb 2024 11:00)  Source: .Blood Blood-Peripheral  Preliminary Report (24 Feb 2024 19:01):    No growth at 4 days    Culture - Blood (collected 20 Feb 2024 10:55)  Source: .Blood Blood-Peripheral  Preliminary Report (24 Feb 2024 19:01):    No growth at 4 days    ___________________________________________________________________________________  ===============>>  A S S E S S M E N T   A N D   P L A N <<===============  ------------------------------------------------------------------------------------------    73 year old female AAOX3, from home, wheelchair-bound and uses cane occasionally , w/ PMHx COPD on 3 L NC at home, Obesity, Hypothyroidism, Diastolic HF on Lasix, Breast cancer s/p surgery, Asthma, Chronic lymphedema, Kidney cancer s/p partial nephrectomy discharged from [facility] on 2/16/24 for chf exacerbation and new Afib with RVR discharged on Eliquis presenting to ED after a fall at home earlier today admitted for hypotension likely septic vs hypovolemic shock.  patient admitted to the ICU for further stabilization  patient overall improved     Assessment:  sepsis / septic Shock   acute blood loss anemia   ALDAIR on CKD III  Hematuria, hemorrhagic UTI  Acute on chronic anemia   Multiple hematomas   Recurrent falls  hematomas  acute rib fractures  Heart failure with preserved EF   COPD   afib   anxiety    =======>>>    ICU care and management appreciated   2 unit PRBC transfusion so far      monitor Hgb closely post transfusion  S/p IV fluid resuscitation     Held diuretics and antihypertensives         resuming diuretics as now fluid overloaded more.. and BP better..      post pressor support : improved >> resume BP medications as able / needed      Hemodynamic monitoring     cardio follow up      Hold anticoagulation ( will need to re-eval long term AC given above) >> likely to defer re-starting post rehabilitation as outpatient     F/u cultures / sensitivities     on Broad spectrum antibiotics    ID on board    Cont. home COPD medications    Podiatry and wound care follow up    palliative appreciated      PT DNR    --------------------------------------------  Case discussed with patient, ICU team..   Education given on findings and plan of care.  ___________________________  Will follow with you.  Thank you,  DIANE Welch D.O.  Pager: [phone]      -GI/DVT Prophylaxis per protocol.    --------------------------------------------  Case discussed with   Education given on findings and plan of care  ___________________________  DIANE Welch D.O.  Pager: [phone]

## 2024-02-25 NOTE — DISCHARGE NOTE PROVIDER - CARE PROVIDER_API CALL
Juan Salcedo  Cardiovascular Disease  [address] [place] [address]  Phone: [phone]  Fax: [phone]  Established Patient  Follow Up Time: 1 week   Juan Salcedo  Cardiovascular Disease  [address] [place] [address]  Phone: [phone]  Fax: [phone]  Established Patient  Follow Up Time: 1 week    Alondra Omalley  Podiatric Medicine  [address] [place] [address]  Phone: [phone]  Fax: [phone]  Follow Up Time: 1 week

## 2024-02-25 NOTE — PROGRESS NOTE ADULT - PROBLEM SELECTOR PLAN 1
Presented to ER after a fall  In ED VS: HR 74, BP 88/56, RR 22, SPo2 87% 4LNC, WBC 34k, Hb 7.9, Cr 2.64, Trop 137, Lactate 5.4, BNP 10k  hypotension likely due to  septic vs hypovolemic shock 2/2 UTI   s/p 3 L IV NS  S/P 2 gm of s/p 2g cefepime, 1 g vanc  c/w Linezolid Abx bid  ID - Dr Arreaga Presented to ER after a fall and found to be hypotensive  hypotension likely due to  septic vs hypovolemic shock 2/2 UTI   In ED VS: HR 74, BP 88/56, RR 22, SPo2 87% 4LNC, WBC 34k, Hb 7.9, Cr 2.64, Trop 137, Lactate 5.4, BNP 10k  s/p 3 L IV NS  S/P 2 gm of s/p 2g cefepime, 1 g vanc  c/w Linezolid Abx bid  ID - Dr Arreaga

## 2024-02-25 NOTE — PROGRESS NOTE ADULT - PROBLEM SELECTOR PLAN 2
In the ER UA positive for UTI   S/P 2 gm of s/p 2g cefepime, 1 g vanc  c/w Linezolid Abx bid  ID - Dr Arreaga

## 2024-02-26 LAB
ALBUMIN SERPL ELPH-MCNC: 1.8 G/DL — LOW (ref 3.5–5)
ALP SERPL-CCNC: 97 U/L — SIGNIFICANT CHANGE UP (ref 40–120)
ALT FLD-CCNC: 17 U/L DA — SIGNIFICANT CHANGE UP (ref 10–60)
ANION GAP SERPL CALC-SCNC: 6 MMOL/L — SIGNIFICANT CHANGE UP (ref 5–17)
AST SERPL-CCNC: 11 U/L — SIGNIFICANT CHANGE UP (ref 10–40)
BASOPHILS # BLD AUTO: 0.06 K/UL — SIGNIFICANT CHANGE UP (ref 0–0.2)
BASOPHILS NFR BLD AUTO: 0.5 % — SIGNIFICANT CHANGE UP (ref 0–2)
BILIRUB SERPL-MCNC: 0.7 MG/DL — SIGNIFICANT CHANGE UP (ref 0.2–1.2)
BUN SERPL-MCNC: 7 MG/DL — SIGNIFICANT CHANGE UP (ref 7–18)
CALCIUM SERPL-MCNC: 8.8 MG/DL — SIGNIFICANT CHANGE UP (ref 8.4–10.5)
CHLORIDE SERPL-SCNC: 104 MMOL/L — SIGNIFICANT CHANGE UP (ref 96–108)
CO2 SERPL-SCNC: 29 MMOL/L — SIGNIFICANT CHANGE UP (ref 22–31)
CREAT SERPL-MCNC: 0.52 MG/DL — SIGNIFICANT CHANGE UP (ref 0.5–1.3)
EGFR: 97 ML/MIN/1.73M2 — SIGNIFICANT CHANGE UP
EOSINOPHIL # BLD AUTO: 0.13 K/UL — SIGNIFICANT CHANGE UP (ref 0–0.5)
EOSINOPHIL NFR BLD AUTO: 1.1 % — SIGNIFICANT CHANGE UP (ref 0–6)
GLUCOSE SERPL-MCNC: 106 MG/DL — HIGH (ref 70–99)
HCT VFR BLD CALC: 28.4 % — LOW (ref 34.5–45)
HGB BLD-MCNC: 8.5 G/DL — LOW (ref 11.5–15.5)
IMM GRANULOCYTES NFR BLD AUTO: 2.3 % — HIGH (ref 0–0.9)
LYMPHOCYTES # BLD AUTO: 17.3 % — SIGNIFICANT CHANGE UP (ref 13–44)
LYMPHOCYTES # BLD AUTO: 2.04 K/UL — SIGNIFICANT CHANGE UP (ref 1–3.3)
MAGNESIUM SERPL-MCNC: 1.8 MG/DL — SIGNIFICANT CHANGE UP (ref 1.6–2.6)
MCHC RBC-ENTMCNC: 29.6 PG — SIGNIFICANT CHANGE UP (ref 27–34)
MCHC RBC-ENTMCNC: 29.9 GM/DL — LOW (ref 32–36)
MCV RBC AUTO: 99 FL — SIGNIFICANT CHANGE UP (ref 80–100)
MONOCYTES # BLD AUTO: 1.22 K/UL — HIGH (ref 0–0.9)
MONOCYTES NFR BLD AUTO: 10.3 % — SIGNIFICANT CHANGE UP (ref 2–14)
NEUTROPHILS # BLD AUTO: 8.07 K/UL — HIGH (ref 1.8–7.4)
NEUTROPHILS NFR BLD AUTO: 68.5 % — SIGNIFICANT CHANGE UP (ref 43–77)
NRBC # BLD: 0 /100 WBCS — SIGNIFICANT CHANGE UP (ref 0–0)
PHOSPHATE SERPL-MCNC: 2.4 MG/DL — LOW (ref 2.5–4.5)
PLATELET # BLD AUTO: 235 K/UL — SIGNIFICANT CHANGE UP (ref 150–400)
POTASSIUM SERPL-MCNC: 4 MMOL/L — SIGNIFICANT CHANGE UP (ref 3.5–5.3)
POTASSIUM SERPL-SCNC: 4 MMOL/L — SIGNIFICANT CHANGE UP (ref 3.5–5.3)
PROT SERPL-MCNC: 5.6 G/DL — LOW (ref 6–8.3)
RBC # BLD: 2.87 M/UL — LOW (ref 3.8–5.2)
RBC # FLD: 20.7 % — HIGH (ref 10.3–14.5)
SODIUM SERPL-SCNC: 139 MMOL/L — SIGNIFICANT CHANGE UP (ref 135–145)
WBC # BLD: 11.79 K/UL — HIGH (ref 3.8–10.5)
WBC # FLD AUTO: 11.79 K/UL — HIGH (ref 3.8–10.5)

## 2024-02-26 RX ORDER — ZINC OXIDE 200 MG/G
1 OINTMENT TOPICAL
Refills: 0 | Status: DISCONTINUED | OUTPATIENT
Start: 2024-02-26 | End: 2024-02-28

## 2024-02-26 RX ORDER — APIXABAN 2.5 MG/1
5 TABLET, FILM COATED ORAL EVERY 12 HOURS
Refills: 0 | Status: DISCONTINUED | OUTPATIENT
Start: 2024-02-26 | End: 2024-02-28

## 2024-02-26 RX ORDER — LINEZOLID 600 MG/300ML
600 INJECTION, SOLUTION INTRAVENOUS EVERY 12 HOURS
Refills: 0 | Status: DISCONTINUED | OUTPATIENT
Start: 2024-02-26 | End: 2024-02-28

## 2024-02-26 RX ORDER — BACITRACIN ZINC 500 UNIT/G
1 OINTMENT IN PACKET (EA) TOPICAL DAILY
Refills: 0 | Status: DISCONTINUED | OUTPATIENT
Start: 2024-02-26 | End: 2024-02-28

## 2024-02-26 RX ADMIN — NYSTATIN CREAM 1 APPLICATION(S): 100000 CREAM TOPICAL at 05:54

## 2024-02-26 RX ADMIN — Medication 1 APPLICATION(S): at 17:41

## 2024-02-26 RX ADMIN — CHLORHEXIDINE GLUCONATE 1 APPLICATION(S): 213 SOLUTION TOPICAL at 06:38

## 2024-02-26 RX ADMIN — NYSTATIN CREAM 1 APPLICATION(S): 100000 CREAM TOPICAL at 17:41

## 2024-02-26 RX ADMIN — PANTOPRAZOLE SODIUM 40 MILLIGRAM(S): 20 TABLET, DELAYED RELEASE ORAL at 11:42

## 2024-02-26 RX ADMIN — Medication 20 MILLIGRAM(S): at 05:54

## 2024-02-26 RX ADMIN — HEPARIN SODIUM 5000 UNIT(S): 5000 INJECTION INTRAVENOUS; SUBCUTANEOUS at 05:54

## 2024-02-26 RX ADMIN — APIXABAN 5 MILLIGRAM(S): 2.5 TABLET, FILM COATED ORAL at 17:42

## 2024-02-26 RX ADMIN — Medication 125 MICROGRAM(S): at 05:54

## 2024-02-26 RX ADMIN — LINEZOLID 300 MILLIGRAM(S): 600 INJECTION, SOLUTION INTRAVENOUS at 05:52

## 2024-02-26 RX ADMIN — Medication 3 MILLIGRAM(S): at 21:20

## 2024-02-26 RX ADMIN — LINEZOLID 600 MILLIGRAM(S): 600 INJECTION, SOLUTION INTRAVENOUS at 17:54

## 2024-02-26 RX ADMIN — ZINC OXIDE 1 APPLICATION(S): 200 OINTMENT TOPICAL at 17:42

## 2024-02-26 NOTE — PROGRESS NOTE ADULT - ASSESSMENT
incomplete         ( Note written / Date of service 02-26-24 ( This is certified to be the same as "ENTERED" date above ( for billing purposes)))    ==================>> MEDICATIONS <<====================    bacitracin   Ointment 1 Application(s) Topical daily  chlorhexidine 2% Cloths 1 Application(s) Topical <User Schedule>  furosemide   Injectable 20 milliGRAM(s) IV Push daily  heparin   Injectable 5000 Unit(s) SubCutaneous every 12 hours  levothyroxine 125 MICROGram(s) Oral daily  linezolid  IVPB      linezolid  IVPB 600 milliGRAM(s) IV Intermittent every 12 hours  melatonin 3 milliGRAM(s) Oral at bedtime  nystatin Powder 1 Application(s) Topical two times a day  pantoprazole  Injectable 40 milliGRAM(s) IV Push daily  zinc oxide 20% Ointment 1 Application(s) Topical two times a day    MEDICATIONS  (PRN):  acetaminophen     Tablet .. 650 milliGRAM(s) Oral every 6 hours PRN Temp greater or equal to 38C (100.4F), Mild Pain (1 - 3)    ___________  Active diet:  Diet, DASH/TLC:   Sodium & Cholesterol Restricted  Easy to Chew (EASYTOCHEW)  1500mL Fluid Restriction ([id number])  ___________________    ==================>> VITAL SIGNS <<==================    Vital Signs Last 24 HrsT(C): 36.6 (02-26-24 @ 12:54)  T(F): 97.8 (02-26-24 @ 12:54), Max: 98 (02-26-24 @ 05:34)  HR: 88 (02-26-24 @ 12:54) (78 - 98)  BP: 144/82 (02-26-24 @ 12:54)  RR: 18 (02-26-24 @ 12:54) (18 - 20)  SpO2: 99% (02-26-24 @ 12:54) (96% - 99%)      CAPILLARY BLOOD GLUCOSE         ==================>> LAB AND IMAGING <<==================                        8.5    11.79 )-----------( 235      ( 26 Feb 2024 06:22 )             28.4        02-26    139  |  104  |  7   ----------------------------<  106<H>  4.0   |  29  |  0.52    Ca    8.8      26 Feb 2024 06:22  Phos  2.4     02-26  Mg     1.8     02-26    TPro  5.6<L>  /  Alb  1.8<L>  /  TBili  0.7  /  DBili  x   /  AST  11  /  ALT  17  /  AlkPhos  97  02-26    WBC count:   11.79 <<== ,  11.80 <<== ,  12.69 <<== ,  14.26 <<== ,  14.22 <<== ,  19.17 <<==   Hemoglobin:   8.5 <<==,  8.4 <<==,  7.9 <<==,  6.7 <<==,  6.9 <<==,  7.0 <<==  platelets:  235 <==, 199 <==, 214 <==, 205 <==, 213 <==, 208 <==, 238 <==    Creatinine:  0.52  <<==, 0.51  <<==, 0.42  <<==, 0.62  <<==, 0.96  <<==, 1.56  <<==  Sodium:   139  <==, 137  <==, 138  <==, 140  <==, 135  <==, 136  <==, 136  <==       AST:          11(02-26) <== , 12(02-25) <== , 15(02-24) <== , 18(02-23) <== , 22(02-22) <==      ALT:        17(02-26)  <== , 20(02-25)  <== , 20(02-24)  <== , 23(02-23)  <== , 28(02-22)  <==      AP:        97(02-26)  <=, 94(02-25)  <=, 84(02-24)  <=, 90(02-23)  <=, 94(02-22)  <=     Bili:        0.7(02-26)  <=, 0.5(02-25)  <=, 0.6(02-24)  <=, 0.6(02-23)  <=, 0.7(02-22)  <=    ____________________________    M I C R O B I O L O G Y :    Culture - Urine (collected 20 Feb 2024 14:25)  Source: Clean Catch Clean Catch (Midstream)  Final Report (23 Feb 2024 14:20):    >100,000 CFU/ml Enterococcus faecalis    >100,000 CFU/ml Enterococcus faecium (vancomycin resistant)  Organism: Enterococcus faecalis  Enterococcus faecium (vancomycin resistant) (23 Feb 2024 14:20)  Organism: Enterococcus faecium (vancomycin resistant) (23 Feb 2024 14:20)    Sensitivities:      Method Type: MARIA M      -  Ampicillin: R >8 Predicts results to ampicillin/sulbactam, amoxacillin-clavulanate and  piperacillin-tazobactam.      -  Ciprofloxacin: R >2      -  Daptomycin: SDD 2 The breakpoint for SDD (susceptible dose dependent)is based on a dosage regimen of 8-12 mg/kg administered every 24 h in adults and is intended for serious infections due to E. faecium. Consultation with an infectious diseases specialist is recommended.      -  Levofloxacin: R >4      -  Linezolid: S 1      -  Nitrofurantoin: R >64 Should not be used to treat pyelonephritis.      -  Tetracycline: R >8      -  Vancomycin: R >16  Organism: Enterococcus faecalis (23 Feb 2024 14:20)    Sensitivities:      Method Type: MARIA M      -  Ampicillin: S <=2 Predicts results to ampicillin/sulbactam, amoxacillin-clavulanate and  piperacillin-tazobactam.      -  Ciprofloxacin: S <=1      -  Levofloxacin: S <=0.5      -  Nitrofurantoin: S <=32 Should not be used to treat pyelonephritis.      -  Tetracycline: R >8      -  Vancomycin: S 2    Culture - Blood (collected 20 Feb 2024 11:00)  Source: .Blood Blood-Peripheral  Final Report (25 Feb 2024 19:00):    No growth at 5 days    Culture - Blood (collected 20 Feb 2024 10:55)  Source: .Blood Blood-Peripheral  Final Report (25 Feb 2024 19:00):    No growth at 5 days        COVID-19 PCR: NotDetec (02-20-24 @ 11:00)     _________________________________________________________________________________________  ========>>  M E D I C A L   A T T E N D I N G    F O L L O W  U P  N O T E  <<=========  -----------------------------------------------------------------------------------------------------    - Patient seen and examined by me earlier today.   - Patient has been out of the MICU, to step down... today overall doing better , comfortable, eating OK. mentating ok      patient agreeable to go to rehabilitation     ==================>> REVIEW OF SYSTEM <<=================    GEN: no fever, no chills, no pain  RESP: no SOB, no cough, no sputum  CVS: no chest pain, no palpitations  GI: no abdominal pain, no nausea  : no dysuria, no frequency  Neuro: no headache, no dizziness    ==================>> PHYSICAL EXAM <<=================    GEN: A&O X 2-3 , NAD , comfortable, pleasant, calm   HEENT: NCAT, PERRL, MMM, hearing intact  CVS: S1S2 , regular , No M/R/G appreciated  PULM: CTA B/L,  limited exam due to body habitus.   ABD.: soft. non tender, non distended,  obese   Extrem: intact pulses , chronic LE lymphedema >> worsened: left >Rt .. scattered bruises / ecchymosis       ( Note written / Date of service 02-26-24 ( This is certified to be the same as "ENTERED" date above ( for billing purposes)))    ==================>> MEDICATIONS <<====================    bacitracin   Ointment 1 Application(s) Topical daily  chlorhexidine 2% Cloths 1 Application(s) Topical <User Schedule>  furosemide   Injectable 20 milliGRAM(s) IV Push daily  heparin   Injectable 5000 Unit(s) SubCutaneous every 12 hours  levothyroxine 125 MICROGram(s) Oral daily  linezolid  IVPB      linezolid  IVPB 600 milliGRAM(s) IV Intermittent every 12 hours  melatonin 3 milliGRAM(s) Oral at bedtime  nystatin Powder 1 Application(s) Topical two times a day  pantoprazole  Injectable 40 milliGRAM(s) IV Push daily  zinc oxide 20% Ointment 1 Application(s) Topical two times a day    MEDICATIONS  (PRN):  acetaminophen     Tablet .. 650 milliGRAM(s) Oral every 6 hours PRN Temp greater or equal to 38C (100.4F), Mild Pain (1 - 3)    ___________  Active diet:  Diet, DASH/TLC:   Sodium & Cholesterol Restricted  Easy to Chew (EASYTOCHEW)  1500mL Fluid Restriction ([id number])  ___________________    ==================>> VITAL SIGNS <<==================    Vital Signs Last 24 HrsT(C): 36.6 (02-26-24 @ 12:54)  T(F): 97.8 (02-26-24 @ 12:54), Max: 98 (02-26-24 @ 05:34)  HR: 88 (02-26-24 @ 12:54) (78 - 98)  BP: 144/82 (02-26-24 @ 12:54)  RR: 18 (02-26-24 @ 12:54) (18 - 20)  SpO2: 99% (02-26-24 @ 12:54) (96% - 99%)       ==================>> LAB AND IMAGING <<==================                        8.5    11.79 )-----------( 235      ( 26 Feb 2024 06:22 )             28.4        02-26    139  |  104  |  7   ----------------------------<  106<H>  4.0   |  29  |  0.52    Ca    8.8      26 Feb 2024 06:22  Phos  2.4     02-26  Mg     1.8     02-26    TPro  5.6<L>  /  Alb  1.8<L>  /  TBili  0.7  /  DBili  x   /  AST  11  /  ALT  17  /  AlkPhos  97  02-26    WBC count:   11.79 <<== ,  11.80 <<== ,  12.69 <<== ,  14.26 <<== ,  14.22 <<== ,  19.17 <<==   Hemoglobin:   8.5 <<==,  8.4 <<==,  7.9 <<==,  6.7 <<==,  6.9 <<==,  7.0 <<==  platelets:  235 <==, 199 <==, 214 <==, 205 <==, 213 <==, 208 <==, 238 <==    Creatinine:  0.52  <<==, 0.51  <<==, 0.42  <<==, 0.62  <<==, 0.96  <<==, 1.56  <<==  Sodium:   139  <==, 137  <==, 138  <==, 140  <==, 135  <==, 136  <==, 136  <==       AST:          11(02-26) <== , 12(02-25) <== , 15(02-24) <== , 18(02-23) <== , 22(02-22) <==      ALT:        17(02-26)  <== , 20(02-25)  <== , 20(02-24)  <== , 23(02-23)  <== , 28(02-22)  <==      AP:        97(02-26)  <=, 94(02-25)  <=, 84(02-24)  <=, 90(02-23)  <=, 94(02-22)  <=     Bili:        0.7(02-26)  <=, 0.5(02-25)  <=, 0.6(02-24)  <=, 0.6(02-23)  <=, 0.7(02-22)  <=    ____________________________    M I C R O B I O L O G Y :    Culture - Urine (collected 20 Feb 2024 14:25)  Source: Clean Catch Clean Catch (Midstream)  Final Report (23 Feb 2024 14:20):    >100,000 CFU/ml Enterococcus faecalis    >100,000 CFU/ml Enterococcus faecium (vancomycin resistant)  Organism: Enterococcus faecalis  Enterococcus faecium (vancomycin resistant) (23 Feb 2024 14:20)  Organism: Enterococcus faecium (vancomycin resistant) (23 Feb 2024 14:20)    Sensitivities:      Method Type: MARIA M      -  Ampicillin: R >8 Predicts results to ampicillin/sulbactam, amoxacillin-clavulanate and  piperacillin-tazobactam.      -  Ciprofloxacin: R >2      -  Daptomycin: SDD 2 The breakpoint for SDD (susceptible dose dependent)is based on a dosage regimen of 8-12 mg/kg administered every 24 h in adults and is intended for serious infections due to E. faecium. Consultation with an infectious diseases specialist is recommended.      -  Levofloxacin: R >4      -  Linezolid: S 1      -  Nitrofurantoin: R >64 Should not be used to treat pyelonephritis.      -  Tetracycline: R >8      -  Vancomycin: R >16  Organism: Enterococcus faecalis (23 Feb 2024 14:20)    Sensitivities:      Method Type: MARIA M      -  Ampicillin: S <=2 Predicts results to ampicillin/sulbactam, amoxacillin-clavulanate and  piperacillin-tazobactam.      -  Ciprofloxacin: S <=1      -  Levofloxacin: S <=0.5      -  Nitrofurantoin: S <=32 Should not be used to treat pyelonephritis.      -  Tetracycline: R >8      -  Vancomycin: S 2    Culture - Blood (collected 20 Feb 2024 11:00)  Source: .Blood Blood-Peripheral  Final Report (25 Feb 2024 19:00):    No growth at 5 days    Culture - Blood (collected 20 Feb 2024 10:55)  Source: .Blood Blood-Peripheral  Final Report (25 Feb 2024 19:00):    No growth at 5 days        ___________________________________________________________________________________  ===============>>  A S S E S S M E N T   A N D   P L A N <<===============  ------------------------------------------------------------------------------------------    73 year old female AAOX3, from home, wheelchair-bound and uses cane occasionally , w/ PMHx COPD on 3 L NC at home, Obesity, Hypothyroidism, Diastolic HF on Lasix, Breast cancer s/p surgery, Asthma, Chronic lymphedema, Kidney cancer s/p partial nephrectomy discharged from [facility] on 2/16/24 for chf exacerbation and new Afib with RVR discharged on Eliquis presenting to ED after a fall at home earlier today admitted for hypotension likely septic vs hypovolemic shock.  patient admitted to the ICU for further stabilization  patient overall improved     Assessment:  sepsis / septic Shock   acute blood loss anemia   ALDAIR on CKD III  Hematuria, hemorrhagic UTI  Acute on chronic anemia   Multiple hematomas   Recurrent falls  hematomas  acute rib fractures  Heart failure with preserved EF   COPD   afib   anxiety    =======>>>    ICU care and management appreciated   2 unit PRBC transfusion so far      monitor Hgb closely post transfusion  S/p IV fluid resuscitation     Held diuretics and antihypertensives         resuming diuretics as now fluid overloaded more.. and BP better..      post pressor support : improved >> resume BP medications as able / needed      Hemodynamic monitoring     cardio follow up      Hold anticoagulation ( will need to re-eval long term AC given above) >> likely to defer re-starting post rehabilitation as outpatient     F/u cultures / sensitivities     on Broad spectrum antibiotics    ID on board    Cont. home COPD medications    Podiatry and wound care follow up    palliative appreciated      PT DNR  patient has been previously not in agreement to rehabilitation but is now agreeable     --------------------------------------------  Case discussed with patient,   Education given on findings and plan of care.  ___________________________  Will follow with you.  Thank you,  DIANE Welch D.O.  Pager: [phone]      -GI/DVT Prophylaxis per protocol.    --------------------------------------------  Case discussed with   Education given on findings and plan of care  ___________________________  DIANE Welch D.O.  Pager: [phone]

## 2024-02-26 NOTE — PROGRESS NOTE ADULT - PROBLEM SELECTOR PLAN 1
Presented to ER after a fall and found to be hypotensive  hypotension likely due to  septic vs hypovolemic shock 2/2 UTI   In ED VS: HR 74, BP 88/56, RR 22, SPo2 87% 4LNC, WBC 34k, Hb 7.9, Cr 2.64, Trop 137, Lactate 5.4, BNP 10k  s/p 3 L IV NS  S/P 2 gm of s/p 2g cefepime, 1 g vanc  Ucx Enterococcus Faecalis  Bcx negative  c/w Linezolid Abx bid from 2/23  ID - Dr Arreaga

## 2024-02-26 NOTE — CHART NOTE - NSCHARTNOTESELECT_GEN_ALL_CORE
Event Note
Event Note
UCx/Event Note
family update/Event Note
ICU DG/Event Note
Trial of line insertion/Event Note
attempted to place IV/Event Note
family update/Event Note
refusing BP cuff again/Event Note
refusing central line/Event Note

## 2024-02-26 NOTE — CHART NOTE - NSCHARTNOTEFT_GEN_A_CORE
discussed and updated pt condition to spouse at bedside, all questions answered and no further concerns voiced this time. Both pt and family agreeable for JUDITH.

## 2024-02-26 NOTE — PROGRESS NOTE ADULT - ASSESSMENT
A:  Right lower leg skin tear, type 2    P:  Pt evaluated and chart reviewed  Vitals reviewed, + leukocytosis, unlikely RLE as source of leukocytosis  Reviewed right tib/fib xrays, see above notes   Applied mupirocin, Allevyn pad to right lower leg.  Patient to be weightbearing as tolerated b/l   Patient stable for discharge from podiatry standpoint.   WC: Mupirocin DSD  Discussed importance of daily foot examinations and proper shoe gear and to importance of lower Fasting Blood Glucose levels. Strict glycemic control for optimal wound healing.  Podiatry to follow in house.   The patient to make appointment at Dr. Omalley's clinic, located at [address] [place] [address]. Please contact the clinic at [phone].   Discussed and reviewed with Dr. Omalley    A:  Right lower leg skin tear, type 2    P:  Pt evaluated and chart reviewed  Vitals reviewed, + leukocytosis, unlikely RLE as source of leukocytosis  Reviewed right tib/fib xrays, see above notes   Applied mupirocin, Allevyn pad to right lower leg.  Patient to be weightbearing as tolerated b/l   Patient stable for discharge from podiatry standpoint.   WC: Mupirocin DSD  Discussed importance of daily foot examinations and proper shoe gear and to importance of lower Fasting Blood Glucose levels. Strict glycemic control for optimal wound healing.  Podiatry to follow in house.   The patient to make appointment at Dr. Omalley's clinic, located at [phone] [place] [address]. Please contact the clinic at [phone].   Seen bedside with Dr. Nelson  Discussed and reviewed with Dr. Omalley

## 2024-02-26 NOTE — PROGRESS NOTE ADULT - PROBLEM SELECTOR PLAN 6
h/o CHF   BNP 10 k on admission  TTE -G1DD grossly normal LV systolic function  1.5 L fluid restriction  2/25 started Lasix 20 mg IV daily   will switch IV lasix to PO prior to DC, monitor edema, I&O

## 2024-02-26 NOTE — PROGRESS NOTE ADULT - SUBJECTIVE AND OBJECTIVE BOX
73y Female    Meds:  linezolid  IVPB      linezolid  IVPB 600 milliGRAM(s) IV Intermittent every 12 hours    Allergies    Grapes (Hives)  sulfa drugs (Unknown)  Peaches (Hives)  shellfish (Hives)    Intolerances        VITALS:  Vital Signs Last 24 Hrs  T(C): 36.6 (26 Feb 2024 12:54), Max: 36.7 (26 Feb 2024 05:34)  T(F): 97.8 (26 Feb 2024 12:54), Max: 98 (26 Feb 2024 05:34)  HR: 88 (26 Feb 2024 12:54) (78 - 98)  BP: 144/82 (26 Feb 2024 12:54) (118/71 - 144/82)  BP(mean): --  RR: 18 (26 Feb 2024 12:54) (18 - 20)  SpO2: 99% (26 Feb 2024 12:54) (96% - 99%)    Parameters below as of 26 Feb 2024 12:54  Patient On (Oxygen Delivery Method): nasal cannula  O2 Flow (L/min): 3      LABS/DIAGNOSTIC TESTS:                          8.5    11.79 )-----------( 235      ( 26 Feb 2024 06:22 )             28.4         02-26    139  |  104  |  7   ----------------------------<  106<H>  4.0   |  29  |  0.52    Ca    8.8      26 Feb 2024 06:22  Phos  2.4     02-26  Mg     1.8     02-26    TPro  5.6<L>  /  Alb  1.8<L>  /  TBili  0.7  /  DBili  x   /  AST  11  /  ALT  17  /  AlkPhos  97  02-26      LIVER FUNCTIONS - ( 26 Feb 2024 06:22 )  Alb: 1.8 g/dL / Pro: 5.6 g/dL / ALK PHOS: 97 U/L / ALT: 17 U/L DA / AST: 11 U/L / GGT: x             CULTURES: Clean Catch Clean Catch (Midstream)  02-20 @ 14:25   >100,000 CFU/ml Enterococcus faecalis  >100,000 CFU/ml Enterococcus faecium (vancomycin resistant)  --  Enterococcus faecalis  Enterococcus faecium (vancomycin resistant)      .Blood Blood-Peripheral  02-20 @ 11:00   No growth at 5 days  --  --      .Blood Blood-Peripheral  02-20 @ 10:55   No growth at 5 days  --  --      .Blood Blood-Heart  02-13 @ 07:10   No growth at 5 days  --  --                    RADIOLOGY:      ROS:  [  ] UNABLE TO ELICIT 73y Female who is doing well, she has no active complaints, she is afebrile and her WBC count is near normal. She has no urinary symptoms , no coughing or SOB, no abdominal pain or diarrhea. She is on Zyvox for her 2 Enterococcal strains in her urine culture one of which is a VRE. Will switch her to po Zyvox as she is eating. She is on D# 3-4 of Zyvox.    Meds:  linezolid  IVPB      linezolid  IVPB 600 milliGRAM(s) IV Intermittent every 12 hours    Allergies    Grapes (Hives)  sulfa drugs (Unknown)  Peaches (Hives)  shellfish (Hives)    Intolerances        VITALS:  Vital Signs Last 24 Hrs  T(C): 36.6 (26 Feb 2024 12:54), Max: 36.7 (26 Feb 2024 05:34)  T(F): 97.8 (26 Feb 2024 12:54), Max: 98 (26 Feb 2024 05:34)  HR: 88 (26 Feb 2024 12:54) (78 - 98)  BP: 144/82 (26 Feb 2024 12:54) (118/71 - 144/82)  BP(mean): --  RR: 18 (26 Feb 2024 12:54) (18 - 20)  SpO2: 99% (26 Feb 2024 12:54) (96% - 99%)    Parameters below as of 26 Feb 2024 12:54  Patient On (Oxygen Delivery Method): nasal cannula  O2 Flow (L/min): 3      LABS/DIAGNOSTIC TESTS:                          8.5    11.79 )-----------( 235      ( 26 Feb 2024 06:22 )             28.4         02-26    139  |  104  |  7   ----------------------------<  106<H>  4.0   |  29  |  0.52    Ca    8.8      26 Feb 2024 06:22  Phos  2.4     02-26  Mg     1.8     02-26    TPro  5.6<L>  /  Alb  1.8<L>  /  TBili  0.7  /  DBili  x   /  AST  11  /  ALT  17  /  AlkPhos  97  02-26      LIVER FUNCTIONS - ( 26 Feb 2024 06:22 )  Alb: 1.8 g/dL / Pro: 5.6 g/dL / ALK PHOS: 97 U/L / ALT: 17 U/L DA / AST: 11 U/L / GGT: x             CULTURES: Clean Catch Clean Catch (Midstream)  02-20 @ 14:25   >100,000 CFU/ml Enterococcus faecalis  >100,000 CFU/ml Enterococcus faecium (vancomycin resistant)  --  Enterococcus faecalis  Enterococcus faecium (vancomycin resistant)      .Blood Blood-Peripheral  02-20 @ 11:00   No growth at 5 days  --  --      .Blood Blood-Peripheral  02-20 @ 10:55   No growth at 5 days  --  --      .Blood Blood-Heart  02-13 @ 07:10   No growth at 5 days  --  --                    RADIOLOGY:      ROS:  [  ] UNABLE TO ELICIT

## 2024-02-26 NOTE — ADVANCED PRACTICE NURSE CONSULT - ASSESSMENT
This is a 73yr old female patient admitted for Sepsis, presenting with the following:  -There is a scabbed Abrasion to the Bridge of the Nose without drainage  -There is a Skin Tear to the R. Lower leg with red tissue, pale tissue, and drainage  -There is evidence of blanchable erythema to the Bilateral Heels

## 2024-02-26 NOTE — PROGRESS NOTE ADULT - ASSESSMENT
73 year old female AAOX3, from home, wheelchair-bound and uses cane occasionally , w/ PMHx COPD on 3 L NC at home, Obesity, Hypothyroidism, Diastolic HF on Lasix, Breast cancer s/p surgery, Asthma, Chronic lymphedema, Kidney cancer s/p partial nephrectomy discharged from [facility] on 2/16/24 for CHF exacerbation and new Afib with RVR discharged on Eliquis presenting to ED after a fall at home earlier today. Patient states that she was using her cane to ambulate before tripping and falling landing on her nose, resulting in a wound on her nose that started to bleed prompting her to ED. Patient denies losing consciousness or any preceding symptoms such as headaches, dizziness, and chest pain. Patient reports that she did not take her eliquis since leaving the hospital because she noticed many bruises around her body. She denies any fevers, chills, headaches, dizziness, chest pain, cough, SOB, abd pain, n/v, diarrhea, constipation, hematuria, dysuria, numbness, and weakness.   In ED VS: HR 74, BP 88/56, RR 22, SPo2 87% 4LNC, WBC 34k, Hb 7.9, Cr 2.64, Trop 137, Lactate 5.4, BNP 10k, UA +, Acute 11-12 Rib fractures Left  s/p 2g cefepime, 1 g vanc, 2L NS bolus, started on levophed Admitted to ICU  for hypotension likely septic vs hypovolemic shock 2/2 UTI   2/22- PT recommended JUDITH  2/23- Hgb dropped to 6.7- s/p 1 unit PRBC  2/24- Downgraded to SCU   2/26- No acute overnight events, PT evaluated and reccs JUDITH. Resumed Eliquis.

## 2024-02-26 NOTE — PROGRESS NOTE ADULT - PROBLEM SELECTOR PLAN 4
Hgb 6.7 on 2/23- given 1 unit of PRBC  No GI/  symptoms  Hgb today 8.5  Continue trending CBC   Transfuse if Hgb <7  resumed Eliquis

## 2024-02-26 NOTE — PROGRESS NOTE ADULT - ASSESSMENT
Septic Shock - resolved  UTI  Leukocytosis  Fevers - resolved      Plan - switched Zyvox to 600mgs po bid x 3-4 days more.  DC planning

## 2024-02-26 NOTE — PROGRESS NOTE ADULT - PROBLEM SELECTOR PLAN 7
Patient on Eliquis , ASA at home   Eliquis held due to Anemia, RESUMED 2/26 5mg BID.   Monitor vital signs

## 2024-02-26 NOTE — PROGRESS NOTE ADULT - PROBLEM SELECTOR PLAN 8
Patient with h/o COPD - Uses 3L NC at home   Saturation > 92% on 3 L   Continue monitoring vital signs  not in distress

## 2024-02-26 NOTE — PROGRESS NOTE ADULT - SUBJECTIVE AND OBJECTIVE BOX
Interval: Patient was seen resting bedside in no acute distress. Denies any other pedal complaints at this time.     HPI: 73 year old female AAOX3, from home, wheelchair-bound and uses cane occasionally , w/ PMHx COPD on 3 L NC at home, Obesity, Hypothyroidism, Diastolic HF on Lasix, Breast cancer s/p surgery, Asthma, Chronic lymphedema, Kidney cancer s/p partial nephrectomy discharged from [facility] on 2/16/24 for chf exacerbation and new Afib with RVR discharged on Eliquis presenting to ED after a fall at home earlier today. Patient states that she was using her cane to ambulate before tripping and falling landing on her nose, resulting in a wound on her nose that started to bleed prompting her to ED. Patient denies losing consciousness or any preceding symptoms such as headaches, dizziness, and chest pain. Patient reports that she did not take her eliquis since leaving the hospital because she noticed many bruises around her body. She denies any fevers, chills, headaches, dizziness, chest pain, cough, SOB, abd pain, n/v, diarrhea, constipation, hematuria, dysuria, numbness, and weakness.   In ED VS: HR 74, BP 88/56, RR 22, SPo2 87% 4LNC, WBC 34k, Hb 7.9, Cr 2.64, Trop 137, Lactate 5.4, BNP 10k, UA +, Acute 11-12 Rib fractures Left  s/p 2g cefepime, 1 g vanc, 2L NS bolus, started on levophed     of note she states while transporting via EMS, she developed a wound to her right lower extremity with stabbing pain to the area.     Medications acetaminophen     Tablet .. 650 milliGRAM(s) Oral every 6 hours PRN  bacitracin   Ointment 1 Application(s) Topical daily  chlorhexidine 2% Cloths 1 Application(s) Topical <User Schedule>  furosemide   Injectable 20 milliGRAM(s) IV Push daily  heparin   Injectable 5000 Unit(s) SubCutaneous every 12 hours  levothyroxine 125 MICROGram(s) Oral daily  linezolid  IVPB      linezolid  IVPB 600 milliGRAM(s) IV Intermittent every 12 hours  melatonin 3 milliGRAM(s) Oral at bedtime  nystatin Powder 1 Application(s) Topical two times a day  pantoprazole  Injectable 40 milliGRAM(s) IV Push daily  zinc oxide 20% Ointment 1 Application(s) Topical two times a day    FHFamily history of myocardial infarction    Family history of hypertension    Family history of diabetes mellitus (Sibling)    Family history of heart disease (Sibling)    Family history of thyroid cancer (Child)    ,   PMHHypertension    Hyperlipidemia    Anxiety    Graves disease    Kidney cancer, primary, with metastasis from kidney to other site, left    Breast cancer in situ, left    COPD (chronic obstructive pulmonary disease)    Hypothyroid    DVT (deep venous thrombosis)    Obesity    Sleep apnea    Asthma       PSHS/P cholecystectomy    S/p nephrectomy    S/P breast biopsy    History of pelvic surgery    History of eye surgery    History of carpal tunnel release    History of parathyroidectomy    S/P laminectomy        Labs                          8.5    11.79 )-----------( 235      ( 26 Feb 2024 06:22 )             28.4      02-26    139  |  104  |  7   ----------------------------<  106<H>  4.0   |  29  |  0.52    Ca    8.8      26 Feb 2024 06:22  Phos  2.4     02-26  Mg     1.8     02-26    TPro  5.6<L>  /  Alb  1.8<L>  /  TBili  0.7  /  DBili  x   /  AST  11  /  ALT  17  /  AlkPhos  97  02-26     Vital Signs Last 24 Hrs  T(C): 36.7 (26 Feb 2024 05:34), Max: 36.7 (25 Feb 2024 12:36)  T(F): 98 (26 Feb 2024 05:34), Max: 98 (25 Feb 2024 12:36)  HR: 80 (26 Feb 2024 05:34) (78 - 80)  BP: 144/77 (26 Feb 2024 05:34) (126/75 - 144/77)  BP(mean): 94 (25 Feb 2024 12:36) (94 - 94)  RR: 20 (26 Feb 2024 05:34) (19 - 20)  SpO2: 98% (26 Feb 2024 05:34) (96% - 99%)    Parameters below as of 26 Feb 2024 05:34  Patient On (Oxygen Delivery Method): nasal cannula  O2 Flow (L/min): 4            WBC Count: 11.79 K/uL *H* (02-26-24 @ 06:22)      LE Focused Exam  Vascular: DP 1/4 PT 1/4 b/l. CFT <3 secs x 10. Temp Gradient warm to cool b/l. Pedal hair absent. +non-pitting edema b/l  Dermatological: Partial flap, skin tear noted measuring 10x7x0.1cm, No drainage, surrounding erythema or clinical signs of infection.   Neurological: Patient is awake, alert and oriented x3. Gross Epicritic sensation is intact  MSK: + pain on palpation to wound.  Negative desmond sign b/l   Interval: Patient seen resting in bed comfortably. Not in any pain. No acute overnight events.     HPI: 73 year old female AAOX3, from home, wheelchair-bound and uses cane occasionally , w/ PMHx COPD on 3 L NC at home, Obesity, Hypothyroidism, Diastolic HF on Lasix, Breast cancer s/p surgery, Asthma, Chronic lymphedema, Kidney cancer s/p partial nephrectomy discharged from [facility] on 2/16/24 for chf exacerbation and new Afib with RVR discharged on Eliquis presenting to ED after a fall at home earlier today. Patient states that she was using her cane to ambulate before tripping and falling landing on her nose, resulting in a wound on her nose that started to bleed prompting her to ED. Patient denies losing consciousness or any preceding symptoms such as headaches, dizziness, and chest pain. Patient reports that she did not take her eliquis since leaving the hospital because she noticed many bruises around her body. She denies any fevers, chills, headaches, dizziness, chest pain, cough, SOB, abd pain, n/v, diarrhea, constipation, hematuria, dysuria, numbness, and weakness.   In ED VS: HR 74, BP 88/56, RR 22, SPo2 87% 4LNC, WBC 34k, Hb 7.9, Cr 2.64, Trop 137, Lactate 5.4, BNP 10k, UA +, Acute 11-12 Rib fractures Left  s/p 2g cefepime, 1 g vanc, 2L NS bolus, started on levophed     of note she states while transporting via EMS, she developed a wound to her right lower extremity with stabbing pain to the area.     Medications acetaminophen     Tablet .. 650 milliGRAM(s) Oral every 6 hours PRN  bacitracin   Ointment 1 Application(s) Topical daily  chlorhexidine 2% Cloths 1 Application(s) Topical <User Schedule>  furosemide   Injectable 20 milliGRAM(s) IV Push daily  heparin   Injectable 5000 Unit(s) SubCutaneous every 12 hours  levothyroxine 125 MICROGram(s) Oral daily  linezolid  IVPB      linezolid  IVPB 600 milliGRAM(s) IV Intermittent every 12 hours  melatonin 3 milliGRAM(s) Oral at bedtime  nystatin Powder 1 Application(s) Topical two times a day  pantoprazole  Injectable 40 milliGRAM(s) IV Push daily  zinc oxide 20% Ointment 1 Application(s) Topical two times a day    FHFamily history of myocardial infarction    Family history of hypertension    Family history of diabetes mellitus (Sibling)    Family history of heart disease (Sibling)    Family history of thyroid cancer (Child)    ,   PMHHypertension    Hyperlipidemia    Anxiety    Graves disease    Kidney cancer, primary, with metastasis from kidney to other site, left    Breast cancer in situ, left    COPD (chronic obstructive pulmonary disease)    Hypothyroid    DVT (deep venous thrombosis)    Obesity    Sleep apnea    Asthma       PSHS/P cholecystectomy    S/p nephrectomy    S/P breast biopsy    History of pelvic surgery    History of eye surgery    History of carpal tunnel release    History of parathyroidectomy    S/P laminectomy        Labs                          8.5    11.79 )-----------( 235      ( 26 Feb 2024 06:22 )             28.4      02-26    139  |  104  |  7   ----------------------------<  106<H>  4.0   |  29  |  0.52    Ca    8.8      26 Feb 2024 06:22  Phos  2.4     02-26  Mg     1.8     02-26    TPro  5.6<L>  /  Alb  1.8<L>  /  TBili  0.7  /  DBili  x   /  AST  11  /  ALT  17  /  AlkPhos  97  02-26     Vital Signs Last 24 Hrs  T(C): 36.7 (26 Feb 2024 05:34), Max: 36.7 (25 Feb 2024 12:36)  T(F): 98 (26 Feb 2024 05:34), Max: 98 (25 Feb 2024 12:36)  HR: 80 (26 Feb 2024 05:34) (78 - 80)  BP: 144/77 (26 Feb 2024 05:34) (126/75 - 144/77)  BP(mean): 94 (25 Feb 2024 12:36) (94 - 94)  RR: 20 (26 Feb 2024 05:34) (19 - 20)  SpO2: 98% (26 Feb 2024 05:34) (96% - 99%)    Parameters below as of 26 Feb 2024 05:34  Patient On (Oxygen Delivery Method): nasal cannula  O2 Flow (L/min): 4            WBC Count: 11.79 K/uL *H* (02-26-24 @ 06:22)      LE Focused Exam  Vascular: DP 1/4 PT 1/4 b/l. CFT <3 secs x 10. Temp Gradient warm to cool b/l. Pedal hair absent. +non-pitting edema b/l  Dermatological: Partial flap, skin tear noted measuring 10x7x0.1cm, No drainage, surrounding erythema or clinical signs of infection.   Neurological: Patient is awake, alert and oriented x3. Gross Epicritic sensation is intact  MSK: + pain on palpation to wound.  Negative desmond sign b/l

## 2024-02-26 NOTE — ADVANCED PRACTICE NURSE CONSULT - RECOMMEDATIONS
-Clean all wounds with normal saline and apply skin prep to the surrounding skin  -Apply Zinc oxide Moisture Barrier Cream to the Bilateral Gluteal areas b.i.d. PRN  -Frequent toileting  -Apply Bacitracin ointment to the Bridge of the Nose and R. Lower Leg wounds and cover with a non-adherent dry dressing daily PRN  -Elevate/float the patients heels using heel protectors and reposition the patient Q 2hrs using wedges or pillows

## 2024-02-26 NOTE — PROGRESS NOTE ADULT - PROBLEM SELECTOR PLAN 2
In the ER UA positive for UTI   Ucx Enterococcus Faecalis  Bcx negative  S/P 2 gm of s/p 2g cefepime, 1 g vanc  c/w Linezolid Abx bid (2/23-)  ID - Dr Arreaga

## 2024-02-26 NOTE — PROGRESS NOTE ADULT - SUBJECTIVE AND OBJECTIVE BOX
SHEKHAR VASQUEZ    SCU progress note    INTERVAL HPI/OVERNIGHT EVENTS: ***    DNR [ ]   DNI  [  ]    Covid - 19 PCR:     The 4Ms    What Matters Most: see GOC  Age appropriate Medications/Screen for High Risk Medication: Yes  Mentation: see CAM below  Mobility: defer to physical exam    The Confusion Assessment Method (CAM) Diagnostic Algorithm     1: Acute Onset or Fluctuating Course  - Is there evidence of an acute change in mental status from the patient’s baseline? Did the (abnormal) behavior  fluctuate during the day, that is, tend to come and go, or increase and decrease in severity?  [ ] YES [ ] NO     2: Inattention  - Did the patient have difficulty focusing attention, being easily distractible, or having difficulty keeping track of what was being said?  [ ] YES [ ] NO     3: Disorganized thinking  -Was the patient’s thinking disorganized or incoherent, such as rambling or irrelevant conversation, unclear or illogical flow of ideas, or unpredictable switching from subject to subject?  [ ] YES [ ] NO    4: Altered Level of consciousness?  [ ] YES [ ] NO    The diagnosis of delirium by CAM requires the presence of features 1 and 2 and either 3 or 4.    PRESSORS: [ ] YES [ ] NO  linezolid  IVPB      linezolid  IVPB 600 milliGRAM(s) IV Intermittent every 12 hours    Cardiovascular:  Heart Failure  Acute   Acute on Chronic  Chronic       furosemide   Injectable 20 milliGRAM(s) IV Push daily    Pulmonary:    Hematalogic:  heparin   Injectable 5000 Unit(s) SubCutaneous every 12 hours    Other:  acetaminophen     Tablet .. 650 milliGRAM(s) Oral every 6 hours PRN  bacitracin   Ointment 1 Application(s) Topical daily  chlorhexidine 2% Cloths 1 Application(s) Topical <User Schedule>  levothyroxine 125 MICROGram(s) Oral daily  melatonin 3 milliGRAM(s) Oral at bedtime  nystatin Powder 1 Application(s) Topical two times a day  pantoprazole  Injectable 40 milliGRAM(s) IV Push daily  zinc oxide 20% Ointment 1 Application(s) Topical two times a day    acetaminophen     Tablet .. 650 milliGRAM(s) Oral every 6 hours PRN  bacitracin   Ointment 1 Application(s) Topical daily  chlorhexidine 2% Cloths 1 Application(s) Topical <User Schedule>  furosemide   Injectable 20 milliGRAM(s) IV Push daily  heparin   Injectable 5000 Unit(s) SubCutaneous every 12 hours  levothyroxine 125 MICROGram(s) Oral daily  linezolid  IVPB      linezolid  IVPB 600 milliGRAM(s) IV Intermittent every 12 hours  melatonin 3 milliGRAM(s) Oral at bedtime  nystatin Powder 1 Application(s) Topical two times a day  pantoprazole  Injectable 40 milliGRAM(s) IV Push daily  zinc oxide 20% Ointment 1 Application(s) Topical two times a day    Drug Dosing Weight  Height (cm): 152.9 (22 Feb 2024 11:44)  Weight (kg): 104 (20 Feb 2024 10:36)  BMI (kg/m2): 44.5 (22 Feb 2024 11:44)  BSA (m2): 1.98 (22 Feb 2024 11:44)    CENTRAL LINE: [ ] YES [ ] NO  LOCATION:   DATE INSERTED:  REMOVE: [ ] YES [ ] NO  EXPLAIN:    PINZON: [ ] YES [ ] NO    DATE INSERTED:  REMOVE:  [ ] YES [ ] NO  EXPLAIN:    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      History of carpal tunnel release  right wrist      History of parathyroidectomy      S/P laminectomy  now bed and wheelchair ridden with severe pain                  02-25 @ 07:01  -  02-26 @ 07:00  --------------------------------------------------------  IN: 0 mL / OUT: 550 mL / NET: -550 mL            PHYSICAL EXAM:    GENERAL: NAD, well-groomed, well-developed  HEAD:  Atraumatic, Normocephalic  EYES: EOMI, PERRLA, conjunctiva and sclera clear  ENMT: No tonsillar erythema, exudates, or enlargement; Moist mucous membranes, Good dentition, No lesions  NECK: Supple, No JVD, Normal thyroid  NERVOUS SYSTEM:  Alert & Oriented X3, Good concentration; Motor Strength 5/5 B/L upper and lower extremities; DTRs 2+ intact and symmetric  CHEST/LUNG: Clear to percussion bilaterally; No rales, rhonchi, wheezing, or rubs  HEART: Regular rate and rhythm; No murmurs, rubs, or gallops  ABDOMEN: Soft, Nontender, Nondistended; Bowel sounds present  EXTREMITIES:  2+ Peripheral Pulses, No clubbing, cyanosis, or edema  LYMPH: No lymphadenopathy noted  SKIN: No rashes or lesions      LABS:  CBC Full  -  ( 26 Feb 2024 06:22 )  WBC Count : 11.79 K/uL  RBC Count : 2.87 M/uL  Hemoglobin : 8.5 g/dL  Hematocrit : 28.4 %  Platelet Count - Automated : 235 K/uL  Mean Cell Volume : 99.0 fl  Mean Cell Hemoglobin : 29.6 pg  Mean Cell Hemoglobin Concentration : 29.9 gm/dL  Auto Neutrophil # : 8.07 K/uL  Auto Lymphocyte # : 2.04 K/uL  Auto Monocyte # : 1.22 K/uL  Auto Eosinophil # : 0.13 K/uL  Auto Basophil # : 0.06 K/uL  Auto Neutrophil % : 68.5 %  Auto Lymphocyte % : 17.3 %  Auto Monocyte % : 10.3 %  Auto Eosinophil % : 1.1 %  Auto Basophil % : 0.5 %    02-26    139  |  104  |  7   ----------------------------<  106<H>  4.0   |  29  |  0.52    Ca    8.8      26 Feb 2024 06:22  Phos  2.4     02-26  Mg     1.8     02-26    TPro  5.6<L>  /  Alb  1.8<L>  /  TBili  0.7  /  DBili  x   /  AST  11  /  ALT  17  /  AlkPhos  97  02-26      Urinalysis Basic - ( 26 Feb 2024 06:22 )    Color: x / Appearance: x / SG: x / pH: x  Gluc: 106 mg/dL / Ketone: x  / Bili: x / Urobili: x   Blood: x / Protein: x / Nitrite: x   Leuk Esterase: x / RBC: x / WBC x   Sq Epi: x / Non Sq Epi: x / Bacteria: x            [  ]  DVT Prophylaxis  [  ]  Nutrition, Brand, Rate         Goal Rate        Abnormal Nutritional Status -  Malnutrition   Cachexia      Morbid Obesity BMI >/=40    RADIOLOGY & ADDITIONAL STUDIES:        Goals of Care Discussion with Family/Proxy/Other   - see note from/family update note   SHEKHAR VASQUEZ    SCU progress note    INTERVAL HPI/OVERNIGHT EVENTS: seen at bedside, pt states she will agree for PT at rehab facility. She denies any complaints today.     DNR [x ]   DNI  [x  ] Accepts NIV    Covid - 19 PCR: negative    The 4Ms    What Matters Most: see GOC  Age appropriate Medications/Screen for High Risk Medication: Yes  Mentation: see CAM below  Mobility: defer to physical exam    The Confusion Assessment Method (CAM) Diagnostic Algorithm     1: Acute Onset or Fluctuating Course  - Is there evidence of an acute change in mental status from the patient’s baseline? Did the (abnormal) behavior  fluctuate during the day, that is, tend to come and go, or increase and decrease in severity?  [ ] YES [x ] NO     2: Inattention  - Did the patient have difficulty focusing attention, being easily distractible, or having difficulty keeping track of what was being said?  [ ] YES [x ] NO     3: Disorganized thinking  -Was the patient’s thinking disorganized or incoherent, such as rambling or irrelevant conversation, unclear or illogical flow of ideas, or unpredictable switching from subject to subject?  [ ] YES [ x] NO    4: Altered Level of consciousness?  [ ] YES [x ] NO    The diagnosis of delirium by CAM requires the presence of features 1 and 2 and either 3 or 4.    PRESSORS: [ ] YES [x ] NO  linezolid  IVPB      linezolid  IVPB 600 milliGRAM(s) IV Intermittent every 12 hours    Cardiovascular:  Heart Failure  Acute   Acute on Chronic  Chronic       furosemide   Injectable 20 milliGRAM(s) IV Push daily    Pulmonary:    Hematalogic:  heparin   Injectable 5000 Unit(s) SubCutaneous every 12 hours    Other:  acetaminophen     Tablet .. 650 milliGRAM(s) Oral every 6 hours PRN  bacitracin   Ointment 1 Application(s) Topical daily  chlorhexidine 2% Cloths 1 Application(s) Topical <User Schedule>  levothyroxine 125 MICROGram(s) Oral daily  melatonin 3 milliGRAM(s) Oral at bedtime  nystatin Powder 1 Application(s) Topical two times a day  pantoprazole  Injectable 40 milliGRAM(s) IV Push daily  zinc oxide 20% Ointment 1 Application(s) Topical two times a day    acetaminophen     Tablet .. 650 milliGRAM(s) Oral every 6 hours PRN  bacitracin   Ointment 1 Application(s) Topical daily  chlorhexidine 2% Cloths 1 Application(s) Topical <User Schedule>  furosemide   Injectable 20 milliGRAM(s) IV Push daily  heparin   Injectable 5000 Unit(s) SubCutaneous every 12 hours  levothyroxine 125 MICROGram(s) Oral daily  linezolid  IVPB      linezolid  IVPB 600 milliGRAM(s) IV Intermittent every 12 hours  melatonin 3 milliGRAM(s) Oral at bedtime  nystatin Powder 1 Application(s) Topical two times a day  pantoprazole  Injectable 40 milliGRAM(s) IV Push daily  zinc oxide 20% Ointment 1 Application(s) Topical two times a day    Drug Dosing Weight  Height (cm): 152.9 (22 Feb 2024 11:44)  Weight (kg): 104 (20 Feb 2024 10:36)  BMI (kg/m2): 44.5 (22 Feb 2024 11:44)  BSA (m2): 1.98 (22 Feb 2024 11:44)    CENTRAL LINE: [ ] YES [ x] NO  LOCATION:   DATE INSERTED:  REMOVE: [ ] YES [ ] NO  EXPLAIN:    PINZON: [ ] YES [x ] NO    DATE INSERTED:  REMOVE:  [ ] YES [ ] NO  EXPLAIN:    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      History of carpal tunnel release  right wrist      History of parathyroidectomy      S/P laminectomy  now bed and wheelchair ridden with severe pain                  02-25 @ 07:01  -  02-26 @ 07:00  --------------------------------------------------------  IN: 0 mL / OUT: 550 mL / NET: -550 mL            PHYSICAL EXAM:    GENERAL: NAD, Obese  HEAD:  Atraumatic, Normocephalic  EYES: EOMI, PERRLA, conjunctiva and sclera clear  ENMT: No tonsillar erythema, exudates, or enlargement; Moist mucous membranes, Good dentition, No lesions  NECK: Supple, No JVD, Normal thyroid  NERVOUS SYSTEM:  Alert & Oriented X3, Good concentration; Motor Strength 4/5 B/L upper and lower extremities; DTRs 2+ intact and symmetric  CHEST/LUNG: Clear to percussion bilaterally; No rales, rhonchi, wheezing, or rubs  HEART: Regular rate and rhythm; No murmurs, rubs, or gallops  ABDOMEN: Soft, Nontender, Nondistended; Bowel sounds present  EXTREMITIES:  2+ Peripheral Pulses, No clubbing, cyanosis, b/l lower leg edema pitting +1, b/l dorsal hands +2 pitting.   LYMPH: Lymphedema b/l LEs. (chronic)  SKIN: No rashes, large ecchymotic area b/l lower abdomen and b/l arms, There is a scabbed Abrasion to the Bridge of the Nose without drainage  -There is a Skin Tear to the R. Lower leg with red tissue, pale tissue, and drainage  -There is evidence of blanchable erythema to the Bilateral Heels see wound care note in detail      LABS:  CBC Full  -  ( 26 Feb 2024 06:22 )  WBC Count : 11.79 K/uL  RBC Count : 2.87 M/uL  Hemoglobin : 8.5 g/dL  Hematocrit : 28.4 %  Platelet Count - Automated : 235 K/uL  Mean Cell Volume : 99.0 fl  Mean Cell Hemoglobin : 29.6 pg  Mean Cell Hemoglobin Concentration : 29.9 gm/dL  Auto Neutrophil # : 8.07 K/uL  Auto Lymphocyte # : 2.04 K/uL  Auto Monocyte # : 1.22 K/uL  Auto Eosinophil # : 0.13 K/uL  Auto Basophil # : 0.06 K/uL  Auto Neutrophil % : 68.5 %  Auto Lymphocyte % : 17.3 %  Auto Monocyte % : 10.3 %  Auto Eosinophil % : 1.1 %  Auto Basophil % : 0.5 %    02-26    139  |  104  |  7   ----------------------------<  106<H>  4.0   |  29  |  0.52    Ca    8.8      26 Feb 2024 06:22  Phos  2.4     02-26  Mg     1.8     02-26    TPro  5.6<L>  /  Alb  1.8<L>  /  TBili  0.7  /  DBili  x   /  AST  11  /  ALT  17  /  AlkPhos  97  02-26      Urinalysis Basic - ( 26 Feb 2024 06:22 )    Color: x / Appearance: x / SG: x / pH: x  Gluc: 106 mg/dL / Ketone: x  / Bili: x / Urobili: x   Blood: x / Protein: x / Nitrite: x   Leuk Esterase: x / RBC: x / WBC x   Sq Epi: x / Non Sq Epi: x / Bacteria: x            [  ]  DVT Prophylaxis  [  ]  Nutrition, Brand, Rate         Goal Rate        Abnormal Nutritional Status -  Malnutrition   Cachexia      Morbid Obesity BMI >/=40    RADIOLOGY & ADDITIONAL STUDIES:      < from: Xray Tibia + Fibula 2 Views, Right (02.22.24 @ 19:40) >    ACC: [id number] EXAM:  XR TIB FIB AP LAT 2 VIEWS RT   ORDERED BY: SAVANA HARRISON     PROCEDURE DATE:  02/22/2024          INTERPRETATION:  Right lower extremity  wound    2 views right tibia-fibula.    IMPRESSION: Hardware fixation as well as bonyfusion of the distal tibia   and fibula. Intact hardware.  no acute fracture or dislocation. No focal   bone destruction or unusual periosteal reaction. Partially visualized   degenerative change at the ankle. Diffuse soft tissue edema.    --- End ofReport ---            ABDOULAYE ALLRED MD; Attending Radiologist  This document has been electronically signed. Feb 23 2024  2:43PM    < end of copied text >  < from: CT Abdomen and Pelvis No Cont (02.20.24 @ 12:11) >    ACC: [id number] EXAM:  CT CHEST   ORDERED BY: JOSE COELHO     ACC: [id number] EXAM:  CT ABDOMEN AND PELVIS   ORDERED BY: JOSE COELHO     PROCEDURE DATE:  02/20/2024          INTERPRETATION:  CLINICAL INFORMATION: Sepsis    COMPARISON: Chest CT dated 01/13/2024and CT of the abdomen and pelvis   dated 06/16/2015    CONTRAST/COMPLICATIONS:  IV Contrast: NONE  Oral Contrast: NONE  Complications: None reported at time of study completion    PROCEDURE:  CT of the Chest, Abdomen and Pelvis was performed.  Sagittal and coronal reformats were performed.    FINDINGS:  CHEST:  LUNGS AND LARGE AIRWAYS: Patent central airways. Emphysema. Improved   appearance of previously noted basilar posterior left upper lobe   tree-in-bud opacities and left lower lobe consolidation. Small residual   branching opacities in the left lower lobe corresponding to location of   previously noted consolidation. Lower lobe subsegmental atelectasis. Mild   peripheral fibrotic changes. With few tiny calcified and granulomas.  PLEURA:No pleural effusion.  VESSELS: Aortic and arch vessel calcification. Enlarged main pulmonary   artery, suspect underlying pulmonary hypertension.  HEART: Cardiomegaly. No pericardial effusion. Aortic valvular   calcification.  MEDIASTINUM AND MIYA: No lymphadenopathy. Small hiatal hernia.  CHEST WALL AND LOWER NECK: Hemorrhagic changes within bilateral   pectoralis musculature and subjacent tissues.    ABDOMEN AND PELVIS:  LIVER: Within normal limits.  BILE DUCTS: Normal caliber.  GALLBLADDER: Cholecystectomy.  SPLEEN: Within normal limits.  PANCREAS: Partial fatty atrophy. Scattered calcifications, primarily in   the head.  ADRENALS: Within normal limits.  KIDNEYS/URETERS: Bilateral cysts. Mild nonobstructing nephrolithiasis,   punctate stone in each kidney.    BLADDER: Completely decompressed, coiled catheter in place.  REPRODUCTIVE ORGANS: Within normal limits. Coarse right adnexal   calcification.    BOWEL: No bowel obstruction. Appendix is normal. Scant colonic   diverticula.  PERITONEUM: No ascites.  VESSELS: Atherosclerotic changes.  RETROPERITONEUM/LYMPH NODES: No lymphadenopathy.  ABDOMINAL WALL: 7-8 cm fat-containing midline epigastric hernia and small   fat-containing umbilical hernia. Foci of hemorrhagic change within the   low ventral subcutaneous tissues and droplets of air, correlate for   history of subcutaneous injection. Associated skin thickening. Mild soft   tissue contusion in the left upper gluteal/flank region.  BONES: Acute fractures of the posterior left 11th and 12th ribs, 11th rib   fracture displaced/overlapping and 12th rib fracture nondisplaced.   Numerous bilateral chronic and subacute/chronic bilateral rib fractures.   Partially imaged cervicothoracic fusion hardware. For detailed   discussion, see CT of the cervical spine dated 02/13/2024. Degenerative   changes of the spine. Chronic right iliac bone defect presumably related   to prior bone graft procedure.    IMPRESSION:  Chest CT:  1.  Near-complete complete resolution of previously noted   pneumonia/airways disease.  2.  Acute posterior left 11th and 12th rib fractures  3.  Posttraumatic hemorrhagic changes within the chest wall musculature   and subcutaneous tissues.    Abdomen/pelvis:  1.  Foci of hemorrhagic changes within the ventral soft tissues likely   related to subcutaneous injection.  2.  Otherwise, no acute findings.            --- End of Report ---            KATYA NAVARRETE MD; Attending Radiologist  This document has been electronically signed. Feb 20 2024  1:49PM    < end of copied text >    < from: Xray Chest 1 View-PORTABLE IMMEDIATE (02.20.24 @ 11:52) >  Impression:    No acute pulmonary disease.      < end of copied text >  Goals of Care Discussion with Family/Proxy/Other   - see note from/family update note

## 2024-02-26 NOTE — PROGRESS NOTE ADULT - PROBLEM SELECTOR PLAN 11
DVT ppx- resumed Eliquis  Family concerns unsafe at home with multiple falls.   c/w Linezolid f/u ID for duration  c/w Lasix IV (2/26-), will consider to switch po prior to DC, monitor edema, I&O   PT reccs JUDITH, CM following.

## 2024-02-27 LAB
ALBUMIN SERPL ELPH-MCNC: 2 G/DL — LOW (ref 3.5–5)
ALP SERPL-CCNC: 95 U/L — SIGNIFICANT CHANGE UP (ref 40–120)
ALT FLD-CCNC: 16 U/L DA — SIGNIFICANT CHANGE UP (ref 10–60)
ANION GAP SERPL CALC-SCNC: 4 MMOL/L — LOW (ref 5–17)
AST SERPL-CCNC: 14 U/L — SIGNIFICANT CHANGE UP (ref 10–40)
BASOPHILS # BLD AUTO: 0.03 K/UL — SIGNIFICANT CHANGE UP (ref 0–0.2)
BASOPHILS NFR BLD AUTO: 0.3 % — SIGNIFICANT CHANGE UP (ref 0–2)
BILIRUB SERPL-MCNC: 0.6 MG/DL — SIGNIFICANT CHANGE UP (ref 0.2–1.2)
BLD GP AB SCN SERPL QL: SIGNIFICANT CHANGE UP
BUN SERPL-MCNC: 8 MG/DL — SIGNIFICANT CHANGE UP (ref 7–18)
CALCIUM SERPL-MCNC: 9.1 MG/DL — SIGNIFICANT CHANGE UP (ref 8.4–10.5)
CHLORIDE SERPL-SCNC: 103 MMOL/L — SIGNIFICANT CHANGE UP (ref 96–108)
CO2 SERPL-SCNC: 32 MMOL/L — HIGH (ref 22–31)
CREAT SERPL-MCNC: 0.59 MG/DL — SIGNIFICANT CHANGE UP (ref 0.5–1.3)
EGFR: 94 ML/MIN/1.73M2 — SIGNIFICANT CHANGE UP
EOSINOPHIL # BLD AUTO: 0.09 K/UL — SIGNIFICANT CHANGE UP (ref 0–0.5)
EOSINOPHIL NFR BLD AUTO: 0.9 % — SIGNIFICANT CHANGE UP (ref 0–6)
GLUCOSE SERPL-MCNC: 118 MG/DL — HIGH (ref 70–99)
HCT VFR BLD CALC: 29.8 % — LOW (ref 34.5–45)
HGB BLD-MCNC: 9 G/DL — LOW (ref 11.5–15.5)
IMM GRANULOCYTES NFR BLD AUTO: 1.2 % — HIGH (ref 0–0.9)
LYMPHOCYTES # BLD AUTO: 1.75 K/UL — SIGNIFICANT CHANGE UP (ref 1–3.3)
LYMPHOCYTES # BLD AUTO: 17.2 % — SIGNIFICANT CHANGE UP (ref 13–44)
MAGNESIUM SERPL-MCNC: 1.6 MG/DL — SIGNIFICANT CHANGE UP (ref 1.6–2.6)
MCHC RBC-ENTMCNC: 30.2 GM/DL — LOW (ref 32–36)
MCHC RBC-ENTMCNC: 30.3 PG — SIGNIFICANT CHANGE UP (ref 27–34)
MCV RBC AUTO: 100.3 FL — HIGH (ref 80–100)
MONOCYTES # BLD AUTO: 0.98 K/UL — HIGH (ref 0–0.9)
MONOCYTES NFR BLD AUTO: 9.6 % — SIGNIFICANT CHANGE UP (ref 2–14)
NEUTROPHILS # BLD AUTO: 7.22 K/UL — SIGNIFICANT CHANGE UP (ref 1.8–7.4)
NEUTROPHILS NFR BLD AUTO: 70.8 % — SIGNIFICANT CHANGE UP (ref 43–77)
NRBC # BLD: 0 /100 WBCS — SIGNIFICANT CHANGE UP (ref 0–0)
PHOSPHATE SERPL-MCNC: 2.8 MG/DL — SIGNIFICANT CHANGE UP (ref 2.5–4.5)
PLATELET # BLD AUTO: 257 K/UL — SIGNIFICANT CHANGE UP (ref 150–400)
POTASSIUM SERPL-MCNC: 4.1 MMOL/L — SIGNIFICANT CHANGE UP (ref 3.5–5.3)
POTASSIUM SERPL-SCNC: 4.1 MMOL/L — SIGNIFICANT CHANGE UP (ref 3.5–5.3)
PROT SERPL-MCNC: 5.8 G/DL — LOW (ref 6–8.3)
RBC # BLD: 2.97 M/UL — LOW (ref 3.8–5.2)
RBC # FLD: 20.1 % — HIGH (ref 10.3–14.5)
SARS-COV-2 RNA SPEC QL NAA+PROBE: SIGNIFICANT CHANGE UP
SODIUM SERPL-SCNC: 139 MMOL/L — SIGNIFICANT CHANGE UP (ref 135–145)
WBC # BLD: 10.19 K/UL — SIGNIFICANT CHANGE UP (ref 3.8–10.5)
WBC # FLD AUTO: 10.19 K/UL — SIGNIFICANT CHANGE UP (ref 3.8–10.5)

## 2024-02-27 RX ORDER — FUROSEMIDE 40 MG
20 TABLET ORAL DAILY
Refills: 0 | Status: DISCONTINUED | OUTPATIENT
Start: 2024-02-28 | End: 2024-02-28

## 2024-02-27 RX ADMIN — LINEZOLID 600 MILLIGRAM(S): 600 INJECTION, SOLUTION INTRAVENOUS at 18:05

## 2024-02-27 RX ADMIN — NYSTATIN CREAM 1 APPLICATION(S): 100000 CREAM TOPICAL at 05:53

## 2024-02-27 RX ADMIN — APIXABAN 5 MILLIGRAM(S): 2.5 TABLET, FILM COATED ORAL at 18:05

## 2024-02-27 RX ADMIN — Medication 3 MILLIGRAM(S): at 21:58

## 2024-02-27 RX ADMIN — Medication 125 MICROGRAM(S): at 05:52

## 2024-02-27 RX ADMIN — LINEZOLID 600 MILLIGRAM(S): 600 INJECTION, SOLUTION INTRAVENOUS at 05:52

## 2024-02-27 RX ADMIN — APIXABAN 5 MILLIGRAM(S): 2.5 TABLET, FILM COATED ORAL at 05:52

## 2024-02-27 RX ADMIN — ZINC OXIDE 1 APPLICATION(S): 200 OINTMENT TOPICAL at 05:51

## 2024-02-27 RX ADMIN — Medication 20 MILLIGRAM(S): at 05:51

## 2024-02-27 RX ADMIN — ZINC OXIDE 1 APPLICATION(S): 200 OINTMENT TOPICAL at 18:06

## 2024-02-27 RX ADMIN — PANTOPRAZOLE SODIUM 40 MILLIGRAM(S): 20 TABLET, DELAYED RELEASE ORAL at 11:42

## 2024-02-27 RX ADMIN — Medication 1 APPLICATION(S): at 11:42

## 2024-02-27 RX ADMIN — CHLORHEXIDINE GLUCONATE 1 APPLICATION(S): 213 SOLUTION TOPICAL at 05:51

## 2024-02-27 RX ADMIN — NYSTATIN CREAM 1 APPLICATION(S): 100000 CREAM TOPICAL at 18:05

## 2024-02-27 NOTE — PROGRESS NOTE ADULT - PROBLEM SELECTOR PLAN 8
Pt is requesting a call [phone]. Pt indicated she needs an order to get her wheelchair fixed.  Would like to discuss with MA. Ok for 5/10/17   Patient with h/o COPD - Uses 3L NC at home   Saturation > 92% on 3 L   Continue monitoring vital signs  not in distress Patient with h/o COPD - Uses 3L NC at home   Continue monitoring vital signs  Tolerating well  Titrate as tolerated.

## 2024-02-27 NOTE — PROGRESS NOTE ADULT - PROBLEM SELECTOR PLAN 1
Presented to ER after a fall and found to be hypotensive  hypotension likely due to  septic vs hypovolemic shock 2/2 UTI   In ED VS: HR 74, BP 88/56, RR 22, SPo2 87% 4LNC, WBC 34k, Hb 7.9, Cr 2.64, Trop 137, Lactate 5.4, BNP 10k  s/p 3 L IV NS  S/P 2 gm of s/p 2g cefepime, 1 g vanc  Ucx Enterococcus Faecalis  Bcx negative  c/w Linezolid Abx bid from 2/23  ID - Dr Arreaga Presented to ER after a fall and found to be hypotensive  hypotension likely due to  septic vs hypovolemic shock 2/2 UTI   s/p 3 L Bolus in ED  S/P 2 gm of s/p 2g cefepime, 1 g vanc  Ucx Enterococcus Faecalis  Bcx negative  c/w Linezolid Abx bid from 2/23-3/1.   ID - Dr Arreaga

## 2024-02-27 NOTE — PROGRESS NOTE ADULT - PROBLEM SELECTOR PLAN 11
DVT ppx- resumed Eliquis  Family concerns unsafe at home with multiple falls.   c/w Linezolid f/u ID for duration  c/w Lasix IV (2/26-), will consider to switch po prior to DC, monitor edema, I&O   PT reccs JUDITH, CM following. DVT ppx- C/w Eliquis  Family concerns unsafe at home with multiple falls.   c/w Linezolid until 03/01.   PT reccs JUDITH  Discharge to [facility] on 02/28 at 11 AM   Switch Lasix to PO

## 2024-02-27 NOTE — PROGRESS NOTE ADULT - PROBLEM SELECTOR PLAN 7
Patient on Eliquis , ASA at home   Eliquis held due to Anemia, RESUMED 2/26 5mg BID.   Monitor vital signs Patient on Eliquis , ASA at home   Eliquis held due to Anemia, RESUMED 2/26 5mg BID.   Rate controlled

## 2024-02-27 NOTE — PROGRESS NOTE ADULT - ASSESSMENT
_________________________________________________________________________________________  ========>>  M E D I C A L   A T T E N D I N G    F O L L O W  U P  N O T E  <<=========  -----------------------------------------------------------------------------------------------------    - Patient seen and examined by me earlier today.   - Patient has been ok, overall doing better , comfortable, eating OK. mentating ok , no new complains     patient agreeable to go to rehabilitation , awaiting placement     ==================>> REVIEW OF SYSTEM <<=================    GEN: no fever, no chills, no pain  RESP: no SOB, no cough, no sputum  CVS: no chest pain, no palpitations  GI: no abdominal pain, no nausea  : no dysuria, no frequency  Neuro: no headache, no dizziness    ==================>> PHYSICAL EXAM <<=================    GEN: A&O X 2-3 , NAD , comfortable, pleasant, calm   HEENT: NCAT, PERRL, MMM, hearing intact  CVS: S1S2 , regular , No M/R/G appreciated  PULM: CTA B/L,  limited exam due to body habitus.   ABD.: soft. non tender, non distended,  obese   Extrem: intact pulses , chronic LE lymphedema >> worsened: left >Rt .. scattered bruises / ecchymosis       ( Note written / Date of service 02-27-24 ( This is certified to be the same as "ENTERED" date above ( for billing purposes)))    ==================>> MEDICATIONS <<====================    apixaban 5 milliGRAM(s) Oral every 12 hours  bacitracin   Ointment 1 Application(s) Topical daily  chlorhexidine 2% Cloths 1 Application(s) Topical <User Schedule>  furosemide   Injectable 20 milliGRAM(s) IV Push daily  levothyroxine 125 MICROGram(s) Oral daily  linezolid    Tablet 600 milliGRAM(s) Oral every 12 hours  melatonin 3 milliGRAM(s) Oral at bedtime  nystatin Powder 1 Application(s) Topical two times a day  pantoprazole  Injectable 40 milliGRAM(s) IV Push daily  zinc oxide 20% Ointment 1 Application(s) Topical two times a day    MEDICATIONS  (PRN):  acetaminophen     Tablet .. 650 milliGRAM(s) Oral every 6 hours PRN Temp greater or equal to 38C (100.4F), Mild Pain (1 - 3)    ___________  Active diet:  Diet, DASH/TLC:   Sodium & Cholesterol Restricted  Easy to Chew (EASYTOCHEW)  1500mL Fluid Restriction ([id number])  ___________________    ==================>> VITAL SIGNS <<==================    Vital Signs Last 24 HrsT(C): 36.6 (02-27-24 @ 13:28)  T(F): 97.8 (02-27-24 @ 13:28), Max: 97.8 (02-27-24 @ 13:28)  HR: 79 (02-27-24 @ 13:28) (78 - 87)  BP: 163/79 (02-27-24 @ 13:28)   RR: 20 (02-27-24 @ 13:28) (18 - 20)  SpO2: 98% (02-27-24 @ 13:28) (98% - 99%)       ==================>> LAB AND IMAGING <<==================                        9.0    10.19 )-----------( 257      ( 27 Feb 2024 08:42 )             29.8        02-27    139  |  103  |  8   ----------------------------<  118<H>  4.1   |  32<H>  |  0.59    Ca    9.1      27 Feb 2024 08:42  Phos  2.8     02-27  Mg     1.6     02-27    TPro  5.8<L>  /  Alb  2.0<L>  /  TBili  0.6  /  DBili  x   /  AST  14  /  ALT  16  /  AlkPhos  95  02-27    WBC count:   10.19 <<== ,  11.79 <<== ,  11.80 <<== ,  12.69 <<== ,  14.26 <<== ,  14.22 <<==   Hemoglobin:   9.0 <<==,  8.5 <<==,  8.4 <<==,  7.9 <<==,  6.7 <<==,  6.9 <<==  platelets:  257 <==, 235 <==, 199 <==, 214 <==, 205 <==, 213 <==, 208 <==    Creatinine:  0.59  <<==, 0.52  <<==, 0.51  <<==, 0.42  <<==, 0.62  <<==, 0.96  <<==  Sodium:   139  <==, 139  <==, 137  <==, 138  <==, 140  <==, 135  <==       AST:          14(02-27) <== , 11(02-26) <== , 12(02-25) <== , 15(02-24) <== , 18(02-23) <==      ALT:        16(02-27)  <== , 17(02-26)  <== , 20(02-25)  <== , 20(02-24)  <== , 23(02-23)  <==      AP:        95(02-27)  <=, 97(02-26)  <=, 94(02-25)  <=, 84(02-24)  <=, 90(02-23)  <=     Bili:        0.6(02-27)  <=, 0.7(02-26)  <=, 0.5(02-25)  <=, 0.6(02-24)  <=, 0.6(02-23)  <=    ____________________________    M I C R O B I O L O G Y :    Culture - Urine (collected 20 Feb 2024 14:25)  Source: Clean Catch Clean Catch (Midstream)  Final Report (23 Feb 2024 14:20):    >100,000 CFU/ml Enterococcus faecalis    >100,000 CFU/ml Enterococcus faecium (vancomycin resistant)  Organism: Enterococcus faecalis  Enterococcus faecium (vancomycin resistant) (23 Feb 2024 14:20)  Organism: Enterococcus faecium (vancomycin resistant) (23 Feb 2024 14:20)    Sensitivities:      Method Type: MARIA M      -  Ampicillin: R >8 Predicts results to ampicillin/sulbactam, amoxacillin-clavulanate and  piperacillin-tazobactam.      -  Ciprofloxacin: R >2      -  Daptomycin: SDD 2 The breakpoint for SDD (susceptible dose dependent)is based on a dosage regimen of 8-12 mg/kg administered every 24 h in adults and is intended for serious infections due to E. faecium. Consultation with an infectious diseases specialist is recommended.      -  Levofloxacin: R >4      -  Linezolid: S 1      -  Nitrofurantoin: R >64 Should not be used to treat pyelonephritis.      -  Tetracycline: R >8      -  Vancomycin: R >16  Organism: Enterococcus faecalis (23 Feb 2024 14:20)    Sensitivities:      Method Type: MARIA M      -  Ampicillin: S <=2 Predicts results to ampicillin/sulbactam, amoxacillin-clavulanate and  piperacillin-tazobactam.      -  Ciprofloxacin: S <=1      -  Levofloxacin: S <=0.5      -  Nitrofurantoin: S <=32 Should not be used to treat pyelonephritis.      -  Tetracycline: R >8      -  Vancomycin: S 2    Culture - Blood (collected 20 Feb 2024 11:00)  Source: .Blood Blood-Peripheral  Final Report (25 Feb 2024 19:00):    No growth at 5 days    Culture - Blood (collected 20 Feb 2024 10:55)  Source: .Blood Blood-Peripheral  Final Report (25 Feb 2024 19:00):    No growth at 5 days      ___________________________________________________________________________________  ===============>>  A S S E S S M E N T   A N D   P L A N <<===============  ------------------------------------------------------------------------------------------    73 year old female AAOX3, from home, wheelchair-bound and uses cane occasionally , w/ PMHx COPD on 3 L NC at home, Obesity, Hypothyroidism, Diastolic HF on Lasix, Breast cancer s/p surgery, Asthma, Chronic lymphedema, Kidney cancer s/p partial nephrectomy discharged [facility] on 2/16/24 for chf exacerbation and new Afib with RVR discharged on Eliquis presenting to ED after a fall at home earlier today admitted for hypotension likely septic vs hypovolemic shock.  patient admitted to the ICU for further stabilization  patient overall improved     Assessment:  sepsis / septic Shock   acute blood loss anemia   ALDAIR on CKD III  Hematuria, hemorrhagic UTI  Acute on chronic anemia   Multiple hematomas   Recurrent falls  hematomas  acute rib fractures  Heart failure with preserved EF   COPD   afib   anxiety    =======>>>       monitor Hgb closely post transfusion  S/p IV fluid resuscitation  diuretics for fluid overload     post pressor support : improved >> resume BP medications as able / needed      Hemodynamic monitoring     cardio follow up      Hold anticoagulation ( will need to re-eval long term AC given above) >> likely to defer re-starting post rehabilitation as outpatient     F/u cultures / sensitivities     on Broad spectrum antibiotics    ID on board    Cont. home COPD medications    Podiatry and wound care follow up    palliative appreciated      PT DNR  patient has been previously not in agreement to rehabilitation but is now agreeable      >> DC / rehabilitation planing / placement in process     --------------------------------------------  Case discussed with patient,   Education given on findings and plan of care.  ___________________________  Will follow with you.  Thank you,  DIANE Welch D.O.  Pager: [phone]      -GI/DVT Prophylaxis per protocol.    --------------------------------------------  Case discussed with   Education given on findings and plan of care  ___________________________  DIANE Welch D.O.  Pager: [phone]

## 2024-02-27 NOTE — PROGRESS NOTE ADULT - PROBLEM SELECTOR PLAN 6
h/o CHF   BNP 10 k on admission  TTE -G1DD grossly normal LV systolic function  1.5 L fluid restriction  2/25 started Lasix 20 mg IV daily   will switch IV lasix to PO prior to DC, monitor edema, I&O h/o CHF   BNP 10 k on admission  TTE -G1DD grossly normal LV systolic function  1.5 L fluid restriction  2/25 started Lasix 20 mg IV daily   will switch IV Lasix to PO prior to DC, monitor edema, I&O

## 2024-02-27 NOTE — PROGRESS NOTE ADULT - PROBLEM SELECTOR PLAN 5
Creatinine 2.64 on admission   Likely pre renal  s/p IVF  Currently level wnl  Daily BMP Creatinine 2.64 on admission >>0.59  Likely pre renal  s/p IVF  Currently level wnl

## 2024-02-27 NOTE — PROGRESS NOTE ADULT - PROBLEM SELECTOR PLAN 2
In the ER UA positive for UTI   Ucx Enterococcus Faecalis  Bcx negative  S/P 2 gm of s/p 2g cefepime, 1 g vanc  c/w Linezolid Abx bid (2/23-)  ID - Dr Arreaga Ucx + Enterococcus Faecalis  Bcx negative  S/P 2 gm of s/p 2g cefepime, 1 g vanc  c/w Linezolid Abx bid (2/23- 03/01).   ID - Dr Arreaga

## 2024-02-27 NOTE — PROGRESS NOTE ADULT - SUBJECTIVE AND OBJECTIVE BOX
SHEKHAR VASQUEZ    SCU progress note    INTERVAL HPI/OVERNIGHT EVENTS: ***    DNR [ ]   DNI  [  ]    Covid - 19 PCR:     The 4Ms    What Matters Most: see GOC  Age appropriate Medications/Screen for High Risk Medication: Yes  Mentation: see CAM below  Mobility: defer to physical exam    The Confusion Assessment Method (CAM) Diagnostic Algorithm     1: Acute Onset or Fluctuating Course  - Is there evidence of an acute change in mental status from the patient’s baseline? Did the (abnormal) behavior  fluctuate during the day, that is, tend to come and go, or increase and decrease in severity?  [ ] YES [ ] NO     2: Inattention  - Did the patient have difficulty focusing attention, being easily distractible, or having difficulty keeping track of what was being said?  [ ] YES [ ] NO     3: Disorganized thinking  -Was the patient’s thinking disorganized or incoherent, such as rambling or irrelevant conversation, unclear or illogical flow of ideas, or unpredictable switching from subject to subject?  [ ] YES [ ] NO    4: Altered Level of consciousness?  [ ] YES [ ] NO    The diagnosis of delirium by CAM requires the presence of features 1 and 2 and either 3 or 4.    PRESSORS: [ ] YES [ ] NO  linezolid    Tablet 600 milliGRAM(s) Oral every 12 hours    Cardiovascular:  Heart Failure  Acute   Acute on Chronic  Chronic       furosemide   Injectable 20 milliGRAM(s) IV Push daily    Pulmonary:    Hematalogic:  apixaban 5 milliGRAM(s) Oral every 12 hours    Other:  acetaminophen     Tablet .. 650 milliGRAM(s) Oral every 6 hours PRN  bacitracin   Ointment 1 Application(s) Topical daily  chlorhexidine 2% Cloths 1 Application(s) Topical <User Schedule>  levothyroxine 125 MICROGram(s) Oral daily  melatonin 3 milliGRAM(s) Oral at bedtime  nystatin Powder 1 Application(s) Topical two times a day  pantoprazole  Injectable 40 milliGRAM(s) IV Push daily  zinc oxide 20% Ointment 1 Application(s) Topical two times a day    acetaminophen     Tablet .. 650 milliGRAM(s) Oral every 6 hours PRN  apixaban 5 milliGRAM(s) Oral every 12 hours  bacitracin   Ointment 1 Application(s) Topical daily  chlorhexidine 2% Cloths 1 Application(s) Topical <User Schedule>  furosemide   Injectable 20 milliGRAM(s) IV Push daily  levothyroxine 125 MICROGram(s) Oral daily  linezolid    Tablet 600 milliGRAM(s) Oral every 12 hours  melatonin 3 milliGRAM(s) Oral at bedtime  nystatin Powder 1 Application(s) Topical two times a day  pantoprazole  Injectable 40 milliGRAM(s) IV Push daily  zinc oxide 20% Ointment 1 Application(s) Topical two times a day    Drug Dosing Weight  Height (cm): 152.9 (22 Feb 2024 11:44)  Weight (kg): 104 (20 Feb 2024 10:36)  BMI (kg/m2): 44.5 (22 Feb 2024 11:44)  BSA (m2): 1.98 (22 Feb 2024 11:44)    CENTRAL LINE: [ ] YES [ ] NO  LOCATION:   DATE INSERTED:  REMOVE: [ ] YES [ ] NO  EXPLAIN:    PINZON: [ ] YES [ ] NO    DATE INSERTED:  REMOVE:  [ ] YES [ ] NO  EXPLAIN:    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      History of carpal tunnel release  right wrist      History of parathyroidectomy      S/P laminectomy  now bed and wheelchair ridden with severe pain                        PHYSICAL EXAM:    GENERAL: NAD, well-groomed, well-developed  HEAD:  Atraumatic, Normocephalic  EYES: EOMI, PERRLA, conjunctiva and sclera clear  ENMT: No tonsillar erythema, exudates, or enlargement; Moist mucous membranes, Good dentition, No lesions  NECK: Supple, No JVD, Normal thyroid  NERVOUS SYSTEM:  Alert & Oriented X3, Good concentration; Motor Strength 5/5 B/L upper and lower extremities; DTRs 2+ intact and symmetric  CHEST/LUNG: Clear to percussion bilaterally; No rales, rhonchi, wheezing, or rubs  HEART: Regular rate and rhythm; No murmurs, rubs, or gallops  ABDOMEN: Soft, Nontender, Nondistended; Bowel sounds present  EXTREMITIES:  2+ Peripheral Pulses, No clubbing, cyanosis, or edema  LYMPH: No lymphadenopathy noted  SKIN: No rashes or lesions      LABS:  CBC Full  -  ( 27 Feb 2024 08:42 )  WBC Count : 10.19 K/uL  RBC Count : 2.97 M/uL  Hemoglobin : 9.0 g/dL  Hematocrit : 29.8 %  Platelet Count - Automated : 257 K/uL  Mean Cell Volume : 100.3 fl  Mean Cell Hemoglobin : 30.3 pg  Mean Cell Hemoglobin Concentration : 30.2 gm/dL  Auto Neutrophil # : 7.22 K/uL  Auto Lymphocyte # : 1.75 K/uL  Auto Monocyte # : 0.98 K/uL  Auto Eosinophil # : 0.09 K/uL  Auto Basophil # : 0.03 K/uL  Auto Neutrophil % : 70.8 %  Auto Lymphocyte % : 17.2 %  Auto Monocyte % : 9.6 %  Auto Eosinophil % : 0.9 %  Auto Basophil % : 0.3 %    02-27    139  |  103  |  8   ----------------------------<  118<H>  4.1   |  32<H>  |  0.59    Ca    9.1      27 Feb 2024 08:42  Phos  2.8     02-27  Mg     1.6     02-27    TPro  5.8<L>  /  Alb  2.0<L>  /  TBili  0.6  /  DBili  x   /  AST  14  /  ALT  16  /  AlkPhos  95  02-27      Urinalysis Basic - ( 27 Feb 2024 08:42 )    Color: x / Appearance: x / SG: x / pH: x  Gluc: 118 mg/dL / Ketone: x  / Bili: x / Urobili: x   Blood: x / Protein: x / Nitrite: x   Leuk Esterase: x / RBC: x / WBC x   Sq Epi: x / Non Sq Epi: x / Bacteria: x            [  ]  DVT Prophylaxis  [  ]  Nutrition, Brand, Rate         Goal Rate        Abnormal Nutritional Status -  Malnutrition   Cachexia      Morbid Obesity BMI >/=40    RADIOLOGY & ADDITIONAL STUDIES:  ***    Goals of Care Discussion with Family/Proxy/Other   - see note from/family meeting set up for...     SHEKHAR VASQUEZ    SCU progress note    INTERVAL HPI/OVERNIGHT EVENTS: No acute events overnight.     DNR [x ]   DNI  [ x ] with trial of NIV.     Covid - 19 PCR: Negative    The 4Ms    What Matters Most: see GOC  Age appropriate Medications/Screen for High Risk Medication: Yes  Mentation: see CAM below  Mobility: defer to physical exam    The Confusion Assessment Method (CAM) Diagnostic Algorithm     1: Acute Onset or Fluctuating Course  - Is there evidence of an acute change in mental status from the patient’s baseline? Did the (abnormal) behavior  fluctuate during the day, that is, tend to come and go, or increase and decrease in severity?  [ ] YES [ x] NO     2: Inattention  - Did the patient have difficulty focusing attention, being easily distractible, or having difficulty keeping track of what was being said?  [ ] YES [x ] NO     3: Disorganized thinking  -Was the patient’s thinking disorganized or incoherent, such as rambling or irrelevant conversation, unclear or illogical flow of ideas, or unpredictable switching from subject to subject?  [ ] YES [x ] NO    4: Altered Level of consciousness?  [ ] YES [ x] NO    The diagnosis of delirium by CAM requires the presence of features 1 and 2 and either 3 or 4.    PRESSORS: [ ] YES [x ] NO  linezolid    Tablet 600 milliGRAM(s) Oral every 12 hours    Cardiovascular:  Heart Failure  Acute   Acute on Chronic  Chronic       furosemide   Injectable 20 milliGRAM(s) IV Push daily    Pulmonary:    Hematalogic:  apixaban 5 milliGRAM(s) Oral every 12 hours    Other:  acetaminophen     Tablet .. 650 milliGRAM(s) Oral every 6 hours PRN  bacitracin   Ointment 1 Application(s) Topical daily  chlorhexidine 2% Cloths 1 Application(s) Topical <User Schedule>  levothyroxine 125 MICROGram(s) Oral daily  melatonin 3 milliGRAM(s) Oral at bedtime  nystatin Powder 1 Application(s) Topical two times a day  pantoprazole  Injectable 40 milliGRAM(s) IV Push daily  zinc oxide 20% Ointment 1 Application(s) Topical two times a day    acetaminophen     Tablet .. 650 milliGRAM(s) Oral every 6 hours PRN  apixaban 5 milliGRAM(s) Oral every 12 hours  bacitracin   Ointment 1 Application(s) Topical daily  chlorhexidine 2% Cloths 1 Application(s) Topical <User Schedule>  furosemide   Injectable 20 milliGRAM(s) IV Push daily  levothyroxine 125 MICROGram(s) Oral daily  linezolid    Tablet 600 milliGRAM(s) Oral every 12 hours  melatonin 3 milliGRAM(s) Oral at bedtime  nystatin Powder 1 Application(s) Topical two times a day  pantoprazole  Injectable 40 milliGRAM(s) IV Push daily  zinc oxide 20% Ointment 1 Application(s) Topical two times a day    Drug Dosing Weight  Height (cm): 152.9 (22 Feb 2024 11:44)  Weight (kg): 104 (20 Feb 2024 10:36)  BMI (kg/m2): 44.5 (22 Feb 2024 11:44)  BSA (m2): 1.98 (22 Feb 2024 11:44)    CENTRAL LINE: [ ] YES [x ] NO  LOCATION:   DATE INSERTED:  REMOVE: [ ] YES [ ] NO  EXPLAIN:    PINZON: [ ] YES [ ] NO    DATE INSERTED:  REMOVE:  [ ] YES [ ] NO  EXPLAIN:    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      History of carpal tunnel release  right wrist      History of parathyroidectomy      S/P laminectomy  now bed and wheelchair ridden with severe pain      ROS: Denies pain, SOB, CP, HA, sore throat, abdominal pain, back pain. + Leg pain bilaterally with manipulation     PHYSICAL EXAM:    GENERAL: NAD, morbidly obese  HEAD:  Atraumatic, Normocephalic  EYES:  PERRLA, conjunctiva and sclera clear  ENMT:  Moist mucous membranes  NECK: Supple  NERVOUS SYSTEM:  Alert & Oriented X3, Good concentration; Moves upper exts AG and wiggles toes.   CHEST/LUNG: Clear to percussion bilaterally; No rales, rhonchi, wheezing, or rubs. 3L NC.   HEART: Regular rate and rhythm; No murmurs, rubs, or gallops  ABDOMEN: Soft, Nontender, Nondistended; Bowel sounds present  EXTREMITIES:  2+ Peripheral Pulses, No clubbing, cyanosis, +2 pitting edema lower exts.   No cervical lymphadenopathy  SKIN: Skin Tear to the R. Lower leg, stage 1 Bilateral Heels.     LABS:  CBC Full  -  ( 27 Feb 2024 08:42 )  WBC Count : 10.19 K/uL  RBC Count : 2.97 M/uL  Hemoglobin : 9.0 g/dL  Hematocrit : 29.8 %  Platelet Count - Automated : 257 K/uL  Mean Cell Volume : 100.3 fl  Mean Cell Hemoglobin : 30.3 pg  Mean Cell Hemoglobin Concentration : 30.2 gm/dL  Auto Neutrophil # : 7.22 K/uL  Auto Lymphocyte # : 1.75 K/uL  Auto Monocyte # : 0.98 K/uL  Auto Eosinophil # : 0.09 K/uL  Auto Basophil # : 0.03 K/uL  Auto Neutrophil % : 70.8 %  Auto Lymphocyte % : 17.2 %  Auto Monocyte % : 9.6 %  Auto Eosinophil % : 0.9 %  Auto Basophil % : 0.3 %    02-27    139  |  103  |  8   ----------------------------<  118<H>  4.1   |  32<H>  |  0.59    Ca    9.1      27 Feb 2024 08:42  Phos  2.8     02-27  Mg     1.6     02-27    TPro  5.8<L>  /  Alb  2.0<L>  /  TBili  0.6  /  DBili  x   /  AST  14  /  ALT  16  /  AlkPhos  95  02-27      Urinalysis Basic - ( 27 Feb 2024 08:42 )    Color: x / Appearance: x / SG: x / pH: x  Gluc: 118 mg/dL / Ketone: x  / Bili: x / Urobili: x   Blood: x / Protein: x / Nitrite: x   Leuk Esterase: x / RBC: x / WBC x   Sq Epi: x / Non Sq Epi: x / Bacteria: x    [  ]  DVT Prophylaxis  [  ]  Nutrition, Brand, Rate         Goal Rate        Abnormal Nutritional Status -  Malnutrition   Cachexia      Morbid Obesity BMI >/=40    RADIOLOGY & ADDITIONAL STUDIES:    < from: Xray Tibia + Fibula 2 Views, Right (02.22.24 @ 19:40) >    ACC: [id number] EXAM:  XR TIB FIB AP LAT 2 VIEWS RT   ORDERED BY: SAVANA HARRISON     PROCEDURE DATE:  02/22/2024          INTERPRETATION:  Right lower extremity  wound    2 views right tibia-fibula.    IMPRESSION: Hardware fixation as well as bonyfusion of the distal tibia   and fibula. Intact hardware.  no acute fracture or dislocation. No focal   bone destruction or unusual periosteal reaction. Partially visualized   degenerative change at the ankle. Diffuse soft tissue edema.    --- End ofReport ---            ABDOULAYE ALLRED MD; Attending Radiologist  This document has been electronically signed. Feb 23 2024  2:43PM    < end of copied text >  < from: CT Abdomen and Pelvis No Cont (02.20.24 @ 12:11) >    ACC: [id number] EXAM:  CT CHEST   ORDERED BY: JOSE COELHO     ACC: [id number] EXAM:  CT ABDOMEN AND PELVIS   ORDERED BY: JOSE COELHO     PROCEDURE DATE:  02/20/2024          INTERPRETATION:  CLINICAL INFORMATION: Sepsis    COMPARISON: Chest CT dated 01/13/2024and CT of the abdomen and pelvis   dated 06/16/2015    CONTRAST/COMPLICATIONS:  IV Contrast: NONE  Oral Contrast: NONE  Complications: None reported at time of study completion    PROCEDURE:  CT of the Chest, Abdomen and Pelvis was performed.  Sagittal and coronal reformats were performed.    FINDINGS:  CHEST:  LUNGS AND LARGE AIRWAYS: Patent central airways. Emphysema. Improved   appearance of previously noted basilar posterior left upper lobe   tree-in-bud opacities and left lower lobe consolidation. Small residual   branching opacities in the left lower lobe corresponding to location of   previously noted consolidation. Lower lobe subsegmental atelectasis. Mild   peripheral fibrotic changes. With few tiny calcified and granulomas.  PLEURA:No pleural effusion.  VESSELS: Aortic and arch vessel calcification. Enlarged main pulmonary   artery, suspect underlying pulmonary hypertension.  HEART: Cardiomegaly. No pericardial effusion. Aortic valvular   calcification.  MEDIASTINUM AND MIYA: No lymphadenopathy. Small hiatal hernia.  CHEST WALL AND LOWER NECK: Hemorrhagic changes within bilateral   pectoralis musculature and subjacent tissues.    ABDOMEN AND PELVIS:  LIVER: Within normal limits.  BILE DUCTS: Normal caliber.  GALLBLADDER: Cholecystectomy.  SPLEEN: Within normal limits.  PANCREAS: Partial fatty atrophy. Scattered calcifications, primarily in   the head.  ADRENALS: Within normal limits.  KIDNEYS/URETERS: Bilateral cysts. Mild nonobstructing nephrolithiasis,   punctate stone in each kidney.    BLADDER: Completely decompressed, coiled catheter in place.  REPRODUCTIVE ORGANS: Within normal limits. Coarse right adnexal   calcification.    BOWEL: No bowel obstruction. Appendix is normal. Scant colonic   diverticula.  PERITONEUM: No ascites.  VESSELS: Atherosclerotic changes.  RETROPERITONEUM/LYMPH NODES: No lymphadenopathy.  ABDOMINAL WALL: 7-8 cm fat-containing midline epigastric hernia and small   fat-containing umbilical hernia. Foci of hemorrhagic change within the   low ventral subcutaneous tissues and droplets of air, correlate for   history of subcutaneous injection. Associated skin thickening. Mild soft   tissue contusion in the left upper gluteal/flank region.  BONES: Acute fractures of the posterior left 11th and 12th ribs, 11th rib   fracture displaced/overlapping and 12th rib fracture nondisplaced.   Numerous bilateral chronic and subacute/chronic bilateral rib fractures.   Partially imaged cervicothoracic fusion hardware. For detailed   discussion, see CT of the cervical spine dated 02/13/2024. Degenerative   changes of the spine. Chronic right iliac bone defect presumably related   to prior bone graft procedure.    IMPRESSION:  Chest CT:  1.  Near-complete complete resolution of previously noted   pneumonia/airways disease.  2.  Acute posterior left 11th and 12th rib fractures  3.  Posttraumatic hemorrhagic changes within the chest wall musculature   and subcutaneous tissues.    Abdomen/pelvis:  1.  Foci of hemorrhagic changes within the ventral soft tissues likely   related to subcutaneous injection.  2.  Otherwise, no acute findings.            --- End of Report ---            KATYA NAVARRETE MD; Attending Radiologist  This document has been electronically signed. Feb 20 2024  1:49PM    < end of copied text >  < from: CT Chest No Cont (02.20.24 @ 12:10) >  ACC: [id number] EXAM:  CT CHEST   ORDERED BY: JOSE COELHO     ACC: [id number] EXAM:  CT ABDOMEN AND PELVIS   ORDERED BY: JOSE COELHO     PROCEDURE DATE:  02/20/2024          INTERPRETATION:  CLINICAL INFORMATION: Sepsis    COMPARISON: Chest CT dated 01/13/2024and CT of the abdomen and pelvis   dated 06/16/2015    CONTRAST/COMPLICATIONS:  IV Contrast: NONE  Oral Contrast: NONE  Complications: None reported at time of study completion    PROCEDURE:  CT of the Chest, Abdomen and Pelvis was performed.  Sagittal and coronal reformats were performed.    FINDINGS:  CHEST:  LUNGS AND LARGE AIRWAYS: Patent central airways. Emphysema. Improved   appearance of previously noted basilar posterior left upper lobe   tree-in-bud opacities and left lower lobe consolidation. Small residual   branching opacities in the left lower lobe corresponding to location of   previously noted consolidation. Lower lobe subsegmental atelectasis. Mild   peripheral fibrotic changes. With few tiny calcified and granulomas.  PLEURA:No pleural effusion.  VESSELS: Aortic and arch vessel calcification. Enlarged main pulmonary   artery, suspect underlying pulmonary hypertension.  HEART: Cardiomegaly. No pericardial effusion. Aortic valvular   calcification.  MEDIASTINUM AND MIYA: No lymphadenopathy. Small hiatal hernia.  CHEST WALL AND LOWER NECK: Hemorrhagic changes within bilateral   pectoralis musculature and subjacent tissues.    ABDOMEN AND PELVIS:  LIVER: Within normal limits.  BILE DUCTS: Normal caliber.  GALLBLADDER: Cholecystectomy.  SPLEEN: Within normal limits.  PANCREAS: Partial fatty atrophy. Scattered calcifications, primarily in   the head.  ADRENALS: Within normal limits.  KIDNEYS/URETERS: Bilateral cysts. Mild nonobstructing nephrolithiasis,   punctate stone in each kidney.    BLADDER: Completely decompressed, coiled catheter in place.  REPRODUCTIVE ORGANS: Within normal limits. Coarse right adnexal   calcification.    BOWEL: No bowel obstruction. Appendix is normal. Scant colonic   diverticula.  PERITONEUM: No ascites.  VESSELS: Atherosclerotic changes.  RETROPERITONEUM/LYMPH NODES: No lymphadenopathy.  ABDOMINAL WALL: 7-8 cm fat-containing midline epigastric hernia and small   fat-containing umbilical hernia. Foci of hemorrhagic change within the   low ventral subcutaneous tissues and droplets of air, correlate for   history of subcutaneous injection. Associated skin thickening. Mild soft   tissue contusion in the left upper gluteal/flank region.  BONES: Acute fractures of the posterior left 11th and 12th ribs, 11th rib   fracture displaced/overlapping and 12th rib fracture nondisplaced.   Numerous bilateral chronic and subacute/chronic bilateral rib fractures.   Partially imaged cervicothoracic fusion hardware. For detailed   discussion, see CT of the cervical spine dated 02/13/2024. Degenerative   changes of the spine. Chronic right iliac bone defect presumably related   to prior bone graft procedure.    IMPRESSION:  Chest CT:  1.  Near-complete complete resolution of previously noted   pneumonia/airways disease.  2.  Acute posterior left 11th and 12th rib fractures  3.  Posttraumatic hemorrhagic changes within the chest wall musculature   and subcutaneous tissues.    Abdomen/pelvis:  1.  Foci of hemorrhagic changes within the ventral soft tissues likely   related to subcutaneous injection.  2.  Otherwise, no acute findings.      --- End of Report ---      KATYA NAVARRETE MD; Attending Radiologist  This document has been electronically signed. Feb 20 2024  1:49PM    < end of copied text >

## 2024-02-27 NOTE — PROGRESS NOTE ADULT - ASSESSMENT
73 year old female AAOX3, from home, wheelchair-bound and uses cane occasionally , w/ PMHx COPD on 3 L NC at home, Obesity, Hypothyroidism, Diastolic HF on Lasix, Breast cancer s/p surgery, Asthma, Chronic lymphedema, Kidney cancer s/p partial nephrectomy discharged from [facility] on 2/16/24 for CHF exacerbation and new Afib with RVR discharged on Eliquis presenting to ED after a fall at home earlier today. Patient states that she was using her cane to ambulate before tripping and falling landing on her nose, resulting in a wound on her nose that started to bleed prompting her to ED. Patient denies losing consciousness or any preceding symptoms such as headaches, dizziness, and chest pain. Patient reports that she did not take her eliquis since leaving the hospital because she noticed many bruises around her body. She denies any fevers, chills, headaches, dizziness, chest pain, cough, SOB, abd pain, n/v, diarrhea, constipation, hematuria, dysuria, numbness, and weakness.   In ED VS: HR 74, BP 88/56, RR 22, SPo2 87% 4LNC, WBC 34k, Hb 7.9, Cr 2.64, Trop 137, Lactate 5.4, BNP 10k, UA +, Acute 11-12 Rib fractures Left  s/p 2g cefepime, 1 g vanc, 2L NS bolus, started on levophed Admitted to ICU  for hypotension likely septic vs hypovolemic shock 2/2 UTI   2/22- PT recommended JUDITH  2/23- Hgb dropped to 6.7- s/p 1 unit PRBC  2/24- Downgraded to SCU   2/26- No acute overnight events, PT evaluated and reccs JUDITH. Resumed Eliquis.   02/27/2024:  73 year old female AAOX3, from home, wheelchair-bound and uses cane occasionally , w/ PMHx COPD on 3 L NC at home, Obesity, Hypothyroidism, Diastolic HF on Lasix, Breast cancer s/p surgery, Asthma, Chronic lymphedema, Kidney cancer s/p partial nephrectomy discharged from [facility] on 2/16/24 for CHF exacerbation and new Afib with RVR discharged on Eliquis presenting to ED after a fall at home earlier today. Patient states that she was using her cane to ambulate before tripping and falling landing on her nose, resulting in a wound on her nose that started to bleed prompting her to ED. Patient denies losing consciousness or any preceding symptoms such as headaches, dizziness, and chest pain. Patient reports that she did not take her eliquis since leaving the hospital because she noticed many bruises around her body. She denies any fevers, chills, headaches, dizziness, chest pain, cough, SOB, abd pain, n/v, diarrhea, constipation, hematuria, dysuria, numbness, and weakness.   In ED VS: HR 74, BP 88/56, RR 22, SPo2 87% 4LNC, WBC 34k, Hb 7.9, Cr 2.64, Trop 137, Lactate 5.4, BNP 10k, UA +, Acute 11-12 Rib fractures Left  s/p 2g cefepime, 1 g vanc, 2L NS bolus, started on levophed Admitted to ICU  for hypotension likely septic vs hypovolemic shock 2/2 UTI   2/22- PT recommended JUDITH  2/23- Hgb dropped to 6.7- s/p 1 unit PRBC  2/24- Downgraded to SCU   2/26- No acute overnight events, PT evaluated and reccs JUDITH. Resumed Eliquis.   02/27/2024: Hemodynamically stable. On 3L O2 and tolerating. On Zyvox until 03/01/2024. Plan to discharge to [facility] tomorrow at 11 AM.

## 2024-02-27 NOTE — PROGRESS NOTE ADULT - PROBLEM SELECTOR PLAN 4
Hgb 6.7 on 2/23- given 1 unit of PRBC  No GI/  symptoms  Hgb today 8.5  Continue trending CBC   Transfuse if Hgb <7  resumed Eliquis Hgb 6.7 on 2/23- given 1 unit of PRBC  No GI/  symptoms  Hgb now stable.   Transfuse if Hgb <7  C/w  Eliquis

## 2024-02-28 ENCOUNTER — TRANSCRIPTION ENCOUNTER (OUTPATIENT)
Age: 74
End: 2024-02-28

## 2024-02-28 VITALS
SYSTOLIC BLOOD PRESSURE: 168 MMHG | RESPIRATION RATE: 19 BRPM | HEART RATE: 77 BPM | OXYGEN SATURATION: 100 % | DIASTOLIC BLOOD PRESSURE: 74 MMHG | TEMPERATURE: 98 F

## 2024-02-28 PROCEDURE — 84484 ASSAY OF TROPONIN QUANT: CPT

## 2024-02-28 PROCEDURE — 86923 COMPATIBILITY TEST ELECTRIC: CPT

## 2024-02-28 PROCEDURE — 86901 BLOOD TYPING SEROLOGIC RH(D): CPT

## 2024-02-28 PROCEDURE — 71250 CT THORAX DX C-: CPT | Mod: MA

## 2024-02-28 PROCEDURE — 85027 COMPLETE CBC AUTOMATED: CPT

## 2024-02-28 PROCEDURE — 36430 TRANSFUSION BLD/BLD COMPNT: CPT

## 2024-02-28 PROCEDURE — 96365 THER/PROPH/DIAG IV INF INIT: CPT

## 2024-02-28 PROCEDURE — 97110 THERAPEUTIC EXERCISES: CPT

## 2024-02-28 PROCEDURE — 96361 HYDRATE IV INFUSION ADD-ON: CPT

## 2024-02-28 PROCEDURE — 85610 PROTHROMBIN TIME: CPT

## 2024-02-28 PROCEDURE — 87077 CULTURE AEROBIC IDENTIFY: CPT

## 2024-02-28 PROCEDURE — 74176 CT ABD & PELVIS W/O CONTRAST: CPT | Mod: MA

## 2024-02-28 PROCEDURE — 84466 ASSAY OF TRANSFERRIN: CPT

## 2024-02-28 PROCEDURE — 99285 EMERGENCY DEPT VISIT HI MDM: CPT

## 2024-02-28 PROCEDURE — 93005 ELECTROCARDIOGRAM TRACING: CPT

## 2024-02-28 PROCEDURE — 87635 SARS-COV-2 COVID-19 AMP PRB: CPT

## 2024-02-28 PROCEDURE — 83735 ASSAY OF MAGNESIUM: CPT

## 2024-02-28 PROCEDURE — 83605 ASSAY OF LACTIC ACID: CPT

## 2024-02-28 PROCEDURE — 87186 SC STD MICRODIL/AGAR DIL: CPT

## 2024-02-28 PROCEDURE — 85730 THROMBOPLASTIN TIME PARTIAL: CPT

## 2024-02-28 PROCEDURE — 83880 ASSAY OF NATRIURETIC PEPTIDE: CPT

## 2024-02-28 PROCEDURE — 85025 COMPLETE CBC W/AUTO DIFF WBC: CPT

## 2024-02-28 PROCEDURE — 87641 MR-STAPH DNA AMP PROBE: CPT

## 2024-02-28 PROCEDURE — 84443 ASSAY THYROID STIM HORMONE: CPT

## 2024-02-28 PROCEDURE — 36415 COLL VENOUS BLD VENIPUNCTURE: CPT

## 2024-02-28 PROCEDURE — 87086 URINE CULTURE/COLONY COUNT: CPT

## 2024-02-28 PROCEDURE — 99282 EMERGENCY DEPT VISIT SF MDM: CPT

## 2024-02-28 PROCEDURE — 84300 ASSAY OF URINE SODIUM: CPT

## 2024-02-28 PROCEDURE — 82728 ASSAY OF FERRITIN: CPT

## 2024-02-28 PROCEDURE — 99283 EMERGENCY DEPT VISIT LOW MDM: CPT

## 2024-02-28 PROCEDURE — C8929: CPT

## 2024-02-28 PROCEDURE — 83550 IRON BINDING TEST: CPT

## 2024-02-28 PROCEDURE — 80053 COMPREHEN METABOLIC PANEL: CPT

## 2024-02-28 PROCEDURE — 87040 BLOOD CULTURE FOR BACTERIA: CPT

## 2024-02-28 PROCEDURE — 83540 ASSAY OF IRON: CPT

## 2024-02-28 PROCEDURE — 97162 PT EVAL MOD COMPLEX 30 MIN: CPT

## 2024-02-28 PROCEDURE — 87640 STAPH A DNA AMP PROBE: CPT

## 2024-02-28 PROCEDURE — 82570 ASSAY OF URINE CREATININE: CPT

## 2024-02-28 PROCEDURE — 81001 URINALYSIS AUTO W/SCOPE: CPT

## 2024-02-28 PROCEDURE — 84100 ASSAY OF PHOSPHORUS: CPT

## 2024-02-28 PROCEDURE — 73590 X-RAY EXAM OF LOWER LEG: CPT

## 2024-02-28 PROCEDURE — 71045 X-RAY EXAM CHEST 1 VIEW: CPT

## 2024-02-28 PROCEDURE — 86850 RBC ANTIBODY SCREEN: CPT

## 2024-02-28 PROCEDURE — 99284 EMERGENCY DEPT VISIT MOD MDM: CPT

## 2024-02-28 PROCEDURE — 82803 BLOOD GASES ANY COMBINATION: CPT

## 2024-02-28 PROCEDURE — 96367 TX/PROPH/DG ADDL SEQ IV INF: CPT

## 2024-02-28 PROCEDURE — 82962 GLUCOSE BLOOD TEST: CPT

## 2024-02-28 PROCEDURE — P9040: CPT

## 2024-02-28 PROCEDURE — 86900 BLOOD TYPING SEROLOGIC ABO: CPT

## 2024-02-28 RX ORDER — NYSTATIN CREAM 100000 [USP'U]/G
1 CREAM TOPICAL
Qty: 0 | Refills: 0 | DISCHARGE
Start: 2024-02-28

## 2024-02-28 RX ORDER — ZINC OXIDE 200 MG/G
1 OINTMENT TOPICAL
Qty: 0 | Refills: 0 | DISCHARGE
Start: 2024-02-28

## 2024-02-28 RX ORDER — PANTOPRAZOLE SODIUM 20 MG/1
40 TABLET, DELAYED RELEASE ORAL
Qty: 0 | Refills: 0 | DISCHARGE
Start: 2024-02-28

## 2024-02-28 RX ORDER — LINEZOLID 600 MG/300ML
1 INJECTION, SOLUTION INTRAVENOUS
Qty: 6 | Refills: 0
Start: 2024-02-28 | End: 2024-03-01

## 2024-02-28 RX ADMIN — LINEZOLID 600 MILLIGRAM(S): 600 INJECTION, SOLUTION INTRAVENOUS at 06:23

## 2024-02-28 RX ADMIN — NYSTATIN CREAM 1 APPLICATION(S): 100000 CREAM TOPICAL at 06:22

## 2024-02-28 RX ADMIN — APIXABAN 5 MILLIGRAM(S): 2.5 TABLET, FILM COATED ORAL at 06:22

## 2024-02-28 RX ADMIN — ZINC OXIDE 1 APPLICATION(S): 200 OINTMENT TOPICAL at 06:23

## 2024-02-28 RX ADMIN — Medication 20 MILLIGRAM(S): at 06:22

## 2024-02-28 RX ADMIN — CHLORHEXIDINE GLUCONATE 1 APPLICATION(S): 213 SOLUTION TOPICAL at 06:22

## 2024-02-28 RX ADMIN — Medication 125 MICROGRAM(S): at 05:30

## 2024-02-28 NOTE — PROGRESS NOTE ADULT - PROBLEM SELECTOR PLAN 2
Ucx + Enterococcus Faecalis  Bcx negative  S/P 2 gm of s/p 2g cefepime, 1 g vanc  c/w Linezolid Abx bid (2/23- 03/01).   ID - Dr Arreaga

## 2024-02-28 NOTE — DISCHARGE NOTE NURSING/CASE MANAGEMENT/SOCIAL WORK - PATIENT PORTAL LINK FT
You can access the FollowMyHealth Patient Portal offered by [facility] by registering at the following website: [email or web address]. By joining [facility]’s FollowMyHealth portal, you will also be able to view your health information using other applications (apps) compatible with our system.

## 2024-02-28 NOTE — PROGRESS NOTE ADULT - PROBLEM SELECTOR PLAN 11
DVT ppx- C/w Eliquis  Family concerns unsafe at home with multiple falls.   c/w Linezolid until 03/01.   PT reccs JUDITH  Discharge to [facility] today.   Switch Lasix to PO  Discussed discharge planning with intensivist.

## 2024-02-28 NOTE — PROGRESS NOTE ADULT - PROBLEM SELECTOR PLAN 8
Patient with h/o COPD - Uses 3L NC at home   Continue monitoring vital signs  Tolerating well  Titrate as tolerated.

## 2024-02-28 NOTE — PROGRESS NOTE ADULT - SUBJECTIVE AND OBJECTIVE BOX
SHEKHAR VASQUEZ    SCU progress note    INTERVAL HPI/OVERNIGHT EVENTS: ***    DNR [ ]   DNI  [  ]    Covid - 19 PCR:     The 4Ms    What Matters Most: see GOC  Age appropriate Medications/Screen for High Risk Medication: Yes  Mentation: see CAM below  Mobility: defer to physical exam    The Confusion Assessment Method (CAM) Diagnostic Algorithm     1: Acute Onset or Fluctuating Course  - Is there evidence of an acute change in mental status from the patient’s baseline? Did the (abnormal) behavior  fluctuate during the day, that is, tend to come and go, or increase and decrease in severity?  [ ] YES [ ] NO     2: Inattention  - Did the patient have difficulty focusing attention, being easily distractible, or having difficulty keeping track of what was being said?  [ ] YES [ ] NO     3: Disorganized thinking  -Was the patient’s thinking disorganized or incoherent, such as rambling or irrelevant conversation, unclear or illogical flow of ideas, or unpredictable switching from subject to subject?  [ ] YES [ ] NO    4: Altered Level of consciousness?  [ ] YES [ ] NO    The diagnosis of delirium by CAM requires the presence of features 1 and 2 and either 3 or 4.    PRESSORS: [ ] YES [ ] NO  linezolid    Tablet 600 milliGRAM(s) Oral every 12 hours    Cardiovascular:  Heart Failure  Acute   Acute on Chronic  Chronic       furosemide    Tablet 20 milliGRAM(s) Oral daily    Pulmonary:    Hematalogic:  apixaban 5 milliGRAM(s) Oral every 12 hours    Other:  acetaminophen     Tablet .. 650 milliGRAM(s) Oral every 6 hours PRN  bacitracin   Ointment 1 Application(s) Topical daily  chlorhexidine 2% Cloths 1 Application(s) Topical <User Schedule>  levothyroxine 125 MICROGram(s) Oral daily  melatonin 3 milliGRAM(s) Oral at bedtime  nystatin Powder 1 Application(s) Topical two times a day  pantoprazole  Injectable 40 milliGRAM(s) IV Push daily  zinc oxide 20% Ointment 1 Application(s) Topical two times a day    acetaminophen     Tablet .. 650 milliGRAM(s) Oral every 6 hours PRN  apixaban 5 milliGRAM(s) Oral every 12 hours  bacitracin   Ointment 1 Application(s) Topical daily  chlorhexidine 2% Cloths 1 Application(s) Topical <User Schedule>  furosemide    Tablet 20 milliGRAM(s) Oral daily  levothyroxine 125 MICROGram(s) Oral daily  linezolid    Tablet 600 milliGRAM(s) Oral every 12 hours  melatonin 3 milliGRAM(s) Oral at bedtime  nystatin Powder 1 Application(s) Topical two times a day  pantoprazole  Injectable 40 milliGRAM(s) IV Push daily  zinc oxide 20% Ointment 1 Application(s) Topical two times a day    Drug Dosing Weight  Height (cm): 152.9 (22 Feb 2024 11:44)  Weight (kg): 104 (20 Feb 2024 10:36)  BMI (kg/m2): 44.5 (22 Feb 2024 11:44)  BSA (m2): 1.98 (22 Feb 2024 11:44)    CENTRAL LINE: [ ] YES [ ] NO  LOCATION:   DATE INSERTED:  REMOVE: [ ] YES [ ] NO  EXPLAIN:    PINZON: [ ] YES [ ] NO    DATE INSERTED:  REMOVE:  [ ] YES [ ] NO  EXPLAIN:    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      History of carpal tunnel release  right wrist      History of parathyroidectomy      S/P laminectomy  now bed and wheelchair ridden with severe pain                  02-27 @ 07:01  -  02-28 @ 07:00  --------------------------------------------------------  IN: 0 mL / OUT: 1400 mL / NET: -1400 mL            PHYSICAL EXAM:    GENERAL: NAD, well-groomed, well-developed  HEAD:  Atraumatic, Normocephalic  EYES: EOMI, PERRLA, conjunctiva and sclera clear  ENMT: No tonsillar erythema, exudates, or enlargement; Moist mucous membranes, Good dentition, No lesions  NECK: Supple, No JVD, Normal thyroid  NERVOUS SYSTEM:  Alert & Oriented X3, Good concentration; Motor Strength 5/5 B/L upper and lower extremities; DTRs 2+ intact and symmetric  CHEST/LUNG: Clear to percussion bilaterally; No rales, rhonchi, wheezing, or rubs  HEART: Regular rate and rhythm; No murmurs, rubs, or gallops  ABDOMEN: Soft, Nontender, Nondistended; Bowel sounds present  EXTREMITIES:  2+ Peripheral Pulses, No clubbing, cyanosis, or edema  LYMPH: No lymphadenopathy noted  SKIN: No rashes or lesions      LABS:  CBC Full  -  ( 27 Feb 2024 08:42 )  WBC Count : 10.19 K/uL  RBC Count : 2.97 M/uL  Hemoglobin : 9.0 g/dL  Hematocrit : 29.8 %  Platelet Count - Automated : 257 K/uL  Mean Cell Volume : 100.3 fl  Mean Cell Hemoglobin : 30.3 pg  Mean Cell Hemoglobin Concentration : 30.2 gm/dL  Auto Neutrophil # : 7.22 K/uL  Auto Lymphocyte # : 1.75 K/uL  Auto Monocyte # : 0.98 K/uL  Auto Eosinophil # : 0.09 K/uL  Auto Basophil # : 0.03 K/uL  Auto Neutrophil % : 70.8 %  Auto Lymphocyte % : 17.2 %  Auto Monocyte % : 9.6 %  Auto Eosinophil % : 0.9 %  Auto Basophil % : 0.3 %    02-27    139  |  103  |  8   ----------------------------<  118<H>  4.1   |  32<H>  |  0.59    Ca    9.1      27 Feb 2024 08:42  Phos  2.8     02-27  Mg     1.6     02-27    TPro  5.8<L>  /  Alb  2.0<L>  /  TBili  0.6  /  DBili  x   /  AST  14  /  ALT  16  /  AlkPhos  95  02-27      Urinalysis Basic - ( 27 Feb 2024 08:42 )    Color: x / Appearance: x / SG: x / pH: x  Gluc: 118 mg/dL / Ketone: x  / Bili: x / Urobili: x   Blood: x / Protein: x / Nitrite: x   Leuk Esterase: x / RBC: x / WBC x   Sq Epi: x / Non Sq Epi: x / Bacteria: x            [  ]  DVT Prophylaxis  [  ]  Nutrition, Brand, Rate         Goal Rate        Abnormal Nutritional Status -  Malnutrition   Cachexia      Morbid Obesity BMI >/=40    RADIOLOGY & ADDITIONAL STUDIES:  ***    Goals of Care Discussion with Family/Proxy/Other   - see note from/family meeting set up for...

## 2024-02-28 NOTE — PROGRESS NOTE ADULT - PROBLEM SELECTOR PROBLEM 10
Lactic acidosis
Chronic obstructive pulmonary disease (COPD)
Lactic acidosis
Debility
Lactic acidosis

## 2024-02-28 NOTE — PROGRESS NOTE ADULT - PROBLEM SELECTOR PLAN 9
BMI >40 with chronic comorbidities  decline in mobility status requiring a hospital bed and Colin lift for home care  PT recs 2-3x/week for 8-10wks  optimize thyroid and diuretics per primary team    Discussed benefits of weight loss on chronic conditions.   Nutrition following.
Presented to ER post fall   PT following recs JUDITH.   Initially she was refusing rehab, now agreeable. CM following.
Patient on Eliquis , ASA at home   Currently on hold   Monitor vital signs

## 2024-02-28 NOTE — PROGRESS NOTE ADULT - PROBLEM SELECTOR PLAN 4
Hgb 6.7 on 2/23- given 1 unit of PRBC  No GI/  symptoms  Hgb now stable.   Transfuse if Hgb <7  C/w  Eliquis

## 2024-02-28 NOTE — DISCHARGE NOTE NURSING/CASE MANAGEMENT/SOCIAL WORK - NSDCFUADDAPPT_GEN_ALL_CORE_FT
patient to make appointment at Dr. Omalley's clinic, located at [phone] [place] [address]. Please contact the clinic at [phone].     HOLD BUSPAR AND DULOXETINE WHILE ON LINEZOLID (RISK FOR SEROTONIN SYNDROME). PLEASE VERIFY WITH FACILITY PCP.

## 2024-02-28 NOTE — PROGRESS NOTE ADULT - PROBLEM SELECTOR PROBLEM 8
Chronic obstructive pulmonary disease (COPD)
Chronic diastolic congestive heart failure
Chronic obstructive pulmonary disease (COPD)

## 2024-02-28 NOTE — PROGRESS NOTE ADULT - PROBLEM SELECTOR PLAN 10
On Admission Lactate level 5.4 ( most likely sec to infection)  RESOLVED.
Wheelchair bound but reports recent decline now unable to transfer from bed to chair, mostly bedbound now   limited HHA 9-12h x 7d/wk  AO x3 at baseline with mild cognitive impairment c/b episodes of delirium   9 recurrent hospital admissions <1y with 4 hospitalizations over the past 2-months  Deconditioning with prior PT recs that patient deferred  PT recs 2-3x/week for 8-10wks  Supportive Care     STAR patient for COPD and CHF with recurrent admissions, would benefit from Home Visiting Program   Palliative SW following.
On Admission Lactate level 5.4 ( most likely sec to infection)  RESOLVED.
Patient with h/o COPD - Uses 3L NC at home   Saturation > 90% on 3 L   Continue monitoring vital signs
On Admission Lactate level 5.4 ( most likely sec to infection)  RESOLVED.

## 2024-02-28 NOTE — PROGRESS NOTE ADULT - NS ATTEND AMEND GEN_ALL_CORE FT
Problem/Plan - 1:  ·  Problem: Septic shock.   ·  Plan: Presented to ER after a fall and found to be hypotensive  hypotension likely due to  septic vs hypovolemic shock 2/2 UTI.   s/p 3 L Bolus in ED  S/P 2 gm of s/p 2g cefepime, 1 g vanc  Ucx Enterococcus Faecalis  Bcx negative  c/w Linezolid Abx bid from 2/23-3/1.   ID - Dr Arreaga.     Problem/Plan - 2:  ·  Problem: Acute UTI.   ·  Plan: Ucx + Enterococcus Faecalis  Bcx negative  S/P 2 gm of s/p 2g cefepime, 1 g vanc  c/w Linezolid Abx bid (2/23- 03/01).   ID - Dr Arreaga.     Problem/Plan - 3:  ·  Problem: Rib fracture.   ·  Plan: Patient s/p fall  CT chest showing Acute posterior left 11th and 12th rib fractures  Tylenol PRN for pain.
73 year old female AAOX3, from home, wheelchair-bound and uses cane occasionally , w/ PMHx COPD on 3 L NC at home, Obesity, Hypothyroidism, Diastolic HF on Lasix, Breast cancer s/p surgery, Asthma, Chronic lymphedema, Kidney cancer s/p partial nephrectomy [facility] for chf exacerbation and new Afib with RVR discharged on Eliquis presenting to ED after a fall at home earlier today admitted for hypotension likely septic vs hypovolemic shock.    Exam with multiple cutaneous hematomas. Chronic pedal edema. States has been bruising for a while now. Was started on anticoagulation recently for new onset afib. Labs significant for ALDAIR, lactic acidosis, worsening anemia.     CT scans performed in the ED suggestive of multiple acute and chronic rib fractures. Cutaneous hematomas. UA noted to be positive, concerning for UTI.     Post volume resuscitation assessment performed, now on vasopressor support     Assessment:  1. Septic Shock  2. Lactic acidosis   3. Acute kidney injury likely ATN   4. UTI  5. Acute on chronic anemia   6. Multiple hematomas   7. Recurrent falls  8. Heart failure with preserved EF   9. COPD   10. Chronic afib     Plan:  - Hgb stable post transfusion  - Hold all anticoagulation for now, given recurrent falls at home, likely not a candidate for long term AC  - S/p IV fluid resuscitation  - Restart diuretics   - Hemodynamic monitoring  - Antibiotics per ID  - Cont. home COPD medications  - Podiatry and wound care consult  - Palliative care consult given multiple hospitalizations, refusing therapies   - PT evaluation   - Oral diet   - OOB to chair as tolerated   - Hep sq for DVT prophylaxis   - DNR/DNI code status
Problem/Plan - 1:  ·  Problem: Septic shock.   ·  Plan: Presented to ER after a fall and found to be hypotensive  hypotension likely due to  septic vs hypovolemic shock 2/2 UTI   In ED VS: HR 74, BP 88/56, RR 22, SPo2 87% 4LNC, WBC 34k, Hb 7.9, Cr 2.64, Trop 137, Lactate 5.4, BNP 10k  s/p 3 L IV NS  S/P 2 gm of s/p 2g cefepime, 1 g vanc  Ucx Enterococcus Faecalis  Bcx negative  c/w Linezolid Abx bid from 2/23  ID - Dr Arreaga.     Problem/Plan - 2:  ·  Problem: Acute UTI.   ·  Plan: In the ER UA positive for UTI   Ucx Enterococcus Faecalis  Bcx negative  S/P 2 gm of s/p 2g cefepime, 1 g vanc  c/w Linezolid Abx bid (2/23-)  ID - Dr Arreaga.     Problem/Plan - 3:  ·  Problem: Rib fracture.   ·  Plan: Patient s/p fall  CT chest showing Acute posterior left 11th and 12th rib fractures  Tylenol PRN for pain.     Problem/Plan - 4:  ·  Problem: Acute on chronic anemia.   ·  Plan: Hgb 6.7 on 2/23- given 1 unit of PRBC  No GI/  symptoms  Hgb today 8.5  Continue trending CBC   Transfuse if Hgb <7  resumed Eliquis.     Problem/Plan - 5:  ·  Problem: ALDAIR (acute kidney injury).   ·  Plan: Creatinine 2.64 on admission   Likely pre renal  s/p IVF  Currently level wnl  Daily BMP.     Problem/Plan - 6:  ·  Problem: Chronic diastolic congestive heart failure.   ·  Plan: h/o CHF   BNP 10 k on admission  TTE -G1DD grossly normal LV systolic function  1.5 L fluid restriction  2/25 started Lasix 20 mg IV daily   will switch IV lasix to PO prior to DC, monitor edema, I&O.     Problem/Plan - 7:  ·  Problem: Atrial fibrillation.   ·  Plan: Patient on Eliquis , ASA at home   Eliquis held due to Anemia, RESUMED 2/26 5mg BID.   Monitor vital signs.     Problem/Plan - 8:  ·  Problem: Chronic obstructive pulmonary disease (COPD).   ·  Plan: Patient with h/o COPD - Uses 3L NC at home   Saturation > 92% on 3 L   Continue monitoring vital signs  not in distress.
Problem/Plan - 1:  ·  Problem: Chronic diastolic congestive heart failure.   ·  Plan: h/o CHF   BNP 10 k on admission  TTE -G1DD grossly normal LV systolic function  1.5 L fluid restriction  2/25 started Lasix 20 mg IV daily   will switch IV Lasix to PO prior to DC, monitor edema, I&O.     Problem/Plan - 2:  ·  Problem: Atrial fibrillation.   ·  Plan: Patient on Eliquis , ASA at home   Eliquis held due to Anemia, RESUMED 2/26 5mg BID.   Rate controlled.     Problem/Plan - 3:  ·  Problem: Chronic obstructive pulmonary disease (COPD).   ·  Plan: Patient with h/o COPD - Uses 3L NC at home   Continue monitoring vital signs  Tolerating well  Titrate as tolerated.

## 2024-02-28 NOTE — PROGRESS NOTE ADULT - ASSESSMENT
73 year old female AAOX3, from home, wheelchair-bound and uses cane occasionally , w/ PMHx COPD on 3 L NC at home, Obesity, Hypothyroidism, Diastolic HF on Lasix, Breast cancer s/p surgery, Asthma, Chronic lymphedema, Kidney cancer s/p partial nephrectomy discharged from [facility] on 2/16/24 for CHF exacerbation and new Afib with RVR discharged on Eliquis presenting to ED after a fall at home earlier today. Patient states that she was using her cane to ambulate before tripping and falling landing on her nose, resulting in a wound on her nose that started to bleed prompting her to ED. Patient denies losing consciousness or any preceding symptoms such as headaches, dizziness, and chest pain. Patient reports that she did not take her eliquis since leaving the hospital because she noticed many bruises around her body. She denies any fevers, chills, headaches, dizziness, chest pain, cough, SOB, abd pain, n/v, diarrhea, constipation, hematuria, dysuria, numbness, and weakness.   In ED VS: HR 74, BP 88/56, RR 22, SPo2 87% 4LNC, WBC 34k, Hb 7.9, Cr 2.64, Trop 137, Lactate 5.4, BNP 10k, UA +, Acute 11-12 Rib fractures Left  s/p 2g cefepime, 1 g vanc, 2L NS bolus, started on levophed Admitted to ICU  for hypotension likely septic vs hypovolemic shock 2/2 UTI   2/22- PT recommended JUDITH  2/23- Hgb dropped to 6.7- s/p 1 unit PRBC  2/24- Downgraded to SCU   2/26- No acute overnight events, PT evaluated and reccs JUDITH. Resumed Eliquis.   02/27/2024: Hemodynamically stable. On 3L O2 and tolerating. On Zyvox until 03/01/2024. Plan to discharge to [facility] tomorrow at 11 AM.   02/28: Patient to be discharged today to [facility]. Stable. On 3L NC and tolerating well.

## 2024-02-28 NOTE — PROGRESS NOTE ADULT - REASON FOR ADMISSION
Hypotension

## 2024-02-28 NOTE — DISCHARGE NOTE NURSING/CASE MANAGEMENT/SOCIAL WORK - NSDCPEFALRISK_GEN_ALL_CORE
For information on Fall & Injury Prevention, visit: [email or web address] OR  [email or web address] OR  https://www.cdc.gov/steadi/patient.html

## 2024-02-28 NOTE — PROGRESS NOTE ADULT - NUTRITIONAL ASSESSMENT
This patient has been assessed with a concern for Malnutrition and has been determined to have a diagnosis/diagnoses of Morbid obesity (BMI > 40).    This patient is being managed with:   Diet Regular-  Easy to Chew (EASYTOCHEW)  Entered: Feb 21 2024 10:10AM  
This patient has been assessed with a concern for Malnutrition and has been determined to have a diagnosis/diagnoses of Morbid obesity (BMI > 40).    This patient is being managed with:   Diet Regular-  Easy to Chew (EASYTOCHEW)  Entered: Feb 21 2024 10:10AM  
This patient has been assessed with a concern for Malnutrition and has been determined to have a diagnosis/diagnoses of Morbid obesity (BMI > 40).    This patient is being managed with:   Diet DASH/TLC-  Sodium & Cholesterol Restricted  Easy to Chew (EASYTOCHEW)  1500mL Fluid Restriction ([id number])  Entered: Feb 25 2024 10:12AM  
This patient has been assessed with a concern for Malnutrition and has been determined to have a diagnosis/diagnoses of Morbid obesity (BMI > 40).    This patient is being managed with:   Diet Regular-  Easy to Chew (EASYTOCHEW)  Entered: Feb 21 2024 10:10AM  
This patient has been assessed with a concern for Malnutrition and has been determined to have a diagnosis/diagnoses of Morbid obesity (BMI > 40).    This patient is being managed with:   Diet Regular-  Easy to Chew (EASYTOCHEW)  Entered: Feb 21 2024 10:10AM  
This patient has been assessed with a concern for Malnutrition and has been determined to have a diagnosis/diagnoses of Morbid obesity (BMI > 40).    This patient is being managed with:   Diet DASH/TLC-  Sodium & Cholesterol Restricted  Easy to Chew (EASYTOCHEW)  1500mL Fluid Restriction ([id number])  Entered: Feb 25 2024 10:12AM  
This patient has been assessed with a concern for Malnutrition and has been determined to have a diagnosis/diagnoses of Morbid obesity (BMI > 40).    This patient is being managed with:   Diet DASH/TLC-  Sodium & Cholesterol Restricted  Easy to Chew (EASYTOCHEW)  1500mL Fluid Restriction ([id number])  Entered: Feb 25 2024 10:12AM

## 2024-02-28 NOTE — PROGRESS NOTE ADULT - ASSESSMENT
M E D I C A L   A T T E N D I N G    F O L L O W    U P   N O T E  (02-28-24 )                                     ------------------------------------------------------------------------------------------------    patient evaluated by me, case discussed with team, chart, medications, and physical exam reviewed, labs / tests  and vitals reviewed by me, as bellow.   Patient is stable for discharge today. patient going to rehabilitation   Patient to follow up with  PMD / cardio post rehabilitation   See discharge document for full note (discussed with ACP).  [Greater than 35 min spent for these services. ]          ( Note written / Date of service 02-28-24 ( This is certified to be the same as "ENTERED" date above ( for billing purposes)))    ==================>> MEDICATIONS <<====================    apixaban 5 milliGRAM(s) Oral every 12 hours  bacitracin   Ointment 1 Application(s) Topical daily  chlorhexidine 2% Cloths 1 Application(s) Topical <User Schedule>  furosemide    Tablet 20 milliGRAM(s) Oral daily  levothyroxine 125 MICROGram(s) Oral daily  linezolid    Tablet 600 milliGRAM(s) Oral every 12 hours  melatonin 3 milliGRAM(s) Oral at bedtime  nystatin Powder 1 Application(s) Topical two times a day  pantoprazole  Injectable 40 milliGRAM(s) IV Push daily  zinc oxide 20% Ointment 1 Application(s) Topical two times a day    MEDICATIONS  (PRN):  acetaminophen     Tablet .. 650 milliGRAM(s) Oral every 6 hours PRN Temp greater or equal to 38C (100.4F), Mild Pain (1 - 3)    ___________  Active diet:  Diet, DASH/TLC:   Sodium & Cholesterol Restricted  Easy to Chew (EASYTOCHEW)  1500mL Fluid Restriction ([id number])  ___________________    ==================>> VITAL SIGNS <<==================    T(C): 36.4 (02-28-24 @ 05:00), Max: 36.8 (02-27-24 @ 21:27)  HR: 77 (02-28-24 @ 05:00) (77 - 83)  BP: 168/74 (02-28-24 @ 05:00) (137/98 - 168/74)  RR: 19 (02-28-24 @ 05:00) (17 - 19)  SpO2: 100% (02-28-24 @ 05:00) (99% - 100%)       I&O's Summary    27 Feb 2024 07:01  -  28 Feb 2024 07:00  --------------------------------------------------------  IN: 0 mL / OUT: 1400 mL / NET: -1400 mL       ==================>> LAB AND IMAGING <<==================                        9.0    10.19 )-----------( 257      ( 27 Feb 2024 08:42 )             29.8        02-27    139  |  103  |  8   ----------------------------<  118<H>  4.1   |  32<H>  |  0.59    Ca    9.1      27 Feb 2024 08:42  Phos  2.8     02-27  Mg     1.6     02-27    TPro  5.8<L>  /  Alb  2.0<L>  /  TBili  0.6  /  DBili  x   /  AST  14  /  ALT  16  /  AlkPhos  95  02-27       TSH:      9.99   (02-20-24)       ,     2.76   (02-13-24)           Lipid profile:  (02-14-24)     Total: 127     LDL  : (p)     HDL  :34     TG   :161     HgA1C:   (02-14-24)          (02-14-24)      6.4    WBC count:   10.19 <<== ,  11.79 <<== ,  11.80 <<== ,  12.69 <<==   Hemoglobin:   9.0 <<==,  8.5 <<==,  8.4 <<==,  7.9 <<==  platelets:  257 <==, 235 <==, 199 <==, 214 <==, 205 <==, 213 <==, 208 <==    Creatinine:  0.59  <<==, 0.52  <<==, 0.51  <<==, 0.42  <<==, 0.62  <<==  Sodium:   139  <==, 139  <==, 137  <==, 138  <==, 140  <==       AST:          14(02-27) <== , 11(02-26) <== , 12(02-25) <== , 15(02-24) <== , 18(02-23) <==      ALT:        16(02-27)  <== , 17(02-26)  <== , 20(02-25)  <== , 20(02-24)  <== , 23(02-23)  <==      AP:        95(02-27)  <=, 97(02-26)  <=, 94(02-25)  <=, 84(02-24)  <=, 90(02-23)  <=     Bili:        0.6(02-27)  <=, 0.7(02-26)  <=, 0.5(02-25)  <=, 0.6(02-24)  <=, 0.6(02-23)  <=        ( Note written / Date of service 02-28-24 ( This is certified to be the same as "ENTERED" date above ( for billing Purposes )))

## 2024-02-28 NOTE — PROGRESS NOTE ADULT - PROVIDER SPECIALTY LIST ADULT
Critical Care
IUD Removal     Pregnancy Results: negative from n/a test   Consent signed. Procedure discussed with the patient in detail including indication, risks, benefits, alternatives and complications.     Pelvic Exam Findings:      Procedure:  Speculum placed in
Infectious Disease
Internal Medicine
Critical Care
Critical Care
Infectious Disease
Internal Medicine
Internal Medicine
Podiatry
Critical Care
Internal Medicine
Internal Medicine
Podiatry
Critical Care
Critical Care
Palliative Care
Critical Care
Critical Care

## 2024-02-28 NOTE — PROGRESS NOTE ADULT - PROBLEM SELECTOR PLAN 7
Patient on Eliquis , ASA at home   Eliquis held due to Anemia, RESUMED 2/26 5mg BID.   Rate controlled

## 2024-02-28 NOTE — PROGRESS NOTE ADULT - PROBLEM SELECTOR PROBLEM 7
Atrial fibrillation
Atrial fibrillation
Chronic diastolic congestive heart failure
Recurrent falls
Atrial fibrillation

## 2024-02-28 NOTE — PROGRESS NOTE ADULT - PROBLEM SELECTOR PLAN 1
Presented to ER after a fall and found to be hypotensive  hypotension likely due to  septic vs hypovolemic shock 2/2 UTI.   s/p 3 L Bolus in ED  S/P 2 gm of s/p 2g cefepime, 1 g vanc  Ucx Enterococcus Faecalis  Bcx negative  c/w Linezolid Abx bid from 2/23-3/1.   ID - Dr Arreaga

## 2024-02-28 NOTE — PROGRESS NOTE ADULT - PROBLEM SELECTOR PROBLEM 4
Acute on chronic anemia
Recurrent falls
Acute on chronic anemia

## 2024-02-28 NOTE — DISCHARGE NOTE NURSING/CASE MANAGEMENT/SOCIAL WORK - NSDCVIVACCINE_GEN_ALL_CORE_FT
Tdap; 19-Nov-2019 16:31; Christine Skaggs (RN); [employer]; [id number] (Exp. Date: 17-Sep-2021); IntraMuscular; Deltoid Left.; 0.5 milliLiter(s); VIS (VIS Published: 09-May-2013, VIS Presented: 19-Nov-2019);   Tdap; 07-Feb-2024 10:35; Daria Mcgrath (ADDI); [employer]; [id number] (Exp. Date: 20-Jul-2025); IntraMuscular; Deltoid Left.; 0.5 milliLiter(s); VIS (VIS Published: 09-May-2013, VIS Presented: 07-Feb-2024);

## 2024-02-28 NOTE — PROGRESS NOTE ADULT - PROBLEM SELECTOR PROBLEM 6
Chronic venous insufficiency
ALDAIR (acute kidney injury)
Chronic diastolic congestive heart failure

## 2024-02-28 NOTE — PROGRESS NOTE ADULT - PROBLEM SELECTOR PROBLEM 5
ALDAIR (acute kidney injury)
Lactic acidosis
ALDAIR (acute kidney injury)
Atrial fibrillation
ALDAIR (acute kidney injury)

## 2024-03-15 NOTE — ED PROVIDER NOTE - PHYSICAL EXAMINATION
----- Message from George Cabrera MD sent at 3/5/2024  3:28 PM CST -----  Please contact the patient and let them know that their sugar is high and we need to check for diabetes.   A&Ox3,   NCAT.  Neck supple, no LAD.   Lungs CTAB. No w/r/r  Cardiac RRR  Abd soft, NT/ND, +BS, no rebound or guarding.   Extremities: cap refill <2, pulses in distal extremities 4+, no edema.   Skin BL posterior claves with + erythema and warmth, L>R, L side extending to the knees. No involvement of the groin, skin is dry but no noticable open wounds.    No focal Deficits, + essential tremor which pt states is her baseline.

## 2024-04-01 NOTE — ASU PATIENT PROFILE, ADULT - ARRIVAL TIME
Left message for patient to call back regarding medication adherence.       Vanessa Bueno, PharmD, [facility]  Population Health Clinical Pharmacist  [phone]  
08:30

## 2024-04-04 NOTE — ED BEHAVIORAL HEALTH ASSESSMENT NOTE - NS ED BHA SUICIDALITY PRESENT CURRENT PASSIVE IDEATION
Plan of care reviewed with parents, both verbalized understanding, pt progressing with plan of care, no nausea, pain well controlled, tolerating PO, reviewed all DC instructions, when to call MD, when to follow-up, answered questions.     None known

## 2024-04-06 NOTE — ED ADULT NURSE REASSESSMENT NOTE - NS ED NURSE REASSESS COMMENT FT1
Patient states she does not want to have a CT scan.
Patient straight cathed for urine using aceptic technique. Sterile equipment utalized. Second RN present to confirm sterility. UA & C&S were collected and sent to lab, drained aprox 100ml urine. Explained procedure as it was being done - Pt tolerated procedure well. Comfort and safety provided.
negative...

## 2024-05-21 NOTE — DIETITIAN INITIAL EVALUATION ADULT. - ADHERENCE
Ambulatory Pt reports she was not following any diets PTA. Pt reports during the last month she was having a lot of soft food such as bananas, rice pudding, yogurt, matza ball soup and pt started drinking Ensure x2/day recently PTA.

## 2024-05-28 NOTE — PATIENT PROFILE ADULT - INTERNATIONAL TRAVEL
May 28, 2024       Ángel Maharaj MD  [address] [facility] [place] [address]  Via In Basket      Patient: Clark Iniguez   YOB: 1957   Date of Visit: 5/28/2024       Dear Dr. Maharaj:    Thank you for referring Clark Iniguez to me for evaluation. Below are my notes for this visit with her.    If you have questions, please do not hesitate to call me. I look forward to following your patient along with you.  The kindness of your referrals are always greatly appreciated!      Sincerely,        Chris Amaral MD        CC: No Recipients    Chris Amaral MD  5/28/2024  1:23 PM  Sign when Signing Visit  [facility]  POSTOP examination  Patient ID: Clark is a 67 year old female.    Clark accepted a chaperone.    Chief Complaint   Patient presents with   • Surgical Followup     Leep follow up         She is here for postop examination.   She is having no particular problems at this time.  She denies any bleeding at this point.      Past Medical History:   Diagnosis Date   • Alopecia    • Arthritis    • Atypical squamous cells cannot exclude high grade squamous intraepithelial lesion on cytologic smear of cervix (ASC-H)    • Cataract    • Colon polyps    • DM2 (diabetes mellitus, type 2)  (CMD)    • Dry eye syndrome    • Essential (primary) hypertension    • HPV in female    • Mixed hyperlipidemia    • Ocular hypertension    • Sjogren's syndrome  (CMD)    • Vitamin D deficiency        Current Outpatient Medications   Medication Sig Dispense Refill   • cycloSPORINE (RESTASIS) 0.05 % ophthalmic emulsion INSTILL 1 DROP IN LEFT EYE TWICE DAILY     • losartan (COZAAR) 50 MG tablet Take 1 tablet by mouth daily. 90 tablet 3   • atorvastatin (LIPITOR) 40 MG tablet TAKE 1 TABLET BY MOUTH DAILY 90 tablet 3   • hydroxychloroquine (PLAQUENIL) 200 MG tablet Take 1 tablet by mouth daily. (Patient not taking: Reported on 5/28/2024) 90 tablet 1   • VITAMIN D, CHOLECALCIFEROL, PO Take by mouth daily.     • DISPENSE Glucose Test Lancets for New  Diabetic Pt testing 1 time per day E11.39; ACCU CHECK JAYLIN 100 each 3   • blood glucose test strip Test blood sugar 1 times daily as directed. Diagnosis: E11.31. Meter: SHOBHA CONTOUR 100 strip 3     No current facility-administered medications for this visit.       ALLERGIES:  No Known Allergies    Review of Systems  Visit Vitals  /70   Pulse (!) 48   Ht 5' 4\" (1.626 m)   Wt 77.9 kg (171 lb 11.8 oz)   SpO2 99%   BMI 29.48 kg/m²     Physical Exam  Constitutional:       Appearance: Normal appearance.   Genitourinary:      No lesions (Dark brownish lesion in the upper right side of the vagina likely consistent with endometriosis) in the vagina.      Right Labia: No tenderness or lesions.     Left Labia: No tenderness or lesions.     No vaginal atrophy present.       Right Adnexa: not tender and not full.     Left Adnexa: not tender and not full.     Cervix is parous.      Cervical friability present.      Uterus is not enlarged, fixed or tender.      Uterus is retroverted.   Neurological:      Mental Status: She is alert.   Vitals reviewed. Exam conducted with a chaperone present.           Component    Pathologic Diagnosis      A.  LEEP cut at 12:00:   -  Portion of squamous cervical tissue showing reactive epithelial changes and HPV-related changes  -  Partially denuded surface with chronic inflammation, foreign body-type giant cell reaction associated with amorphous pigmented material and hemosiderin deposition, consistent with prior procedural site changes       Problem List Items Addressed This Visit          Genitourinary and Reproductive    Pap smear of cervix with ASCUS, cannot exclude HGSIL    High risk HPV infection - Primary       Assessment/ Plan:     I still cannot rule out high risk lesion given the LEEP showed no evidence of dysplasia although some HPV related changes.  Issues can still be possibly lurking within the endocervix.  Part of the issues that her cervix is somewhat fractured from  previous deliveries.  I will recheck her Pap smear again in 3 months at which time the entire area should have healed to be able to get more information.  Otherwise we will just continue to follow her with Pap smears on a relatively short schedule.    I reemphasized that this is important to do because she has a somewhat lowered immune system.            Chris Amaral MD, FACOG     No

## 2024-06-11 NOTE — ED PROVIDER NOTE - BIRTH SEX
education: Not on file    Highest education level: Not on file   Occupational History    Not on file   Tobacco Use    Smoking status: Never    Smokeless tobacco: Never   Substance and Sexual Activity    Alcohol use: Not on file    Drug use: Not on file    Sexual activity: Not on file   Other Topics Concern    Not on file   Social History Narrative    Not on file     Social Determinants of Health     Financial Resource Strain: Low Risk  (10/21/2021)    Overall Financial Resource Strain (CARDIA)     Difficulty of Paying Living Expenses: Not hard at all   Food Insecurity: No Food Insecurity (10/21/2021)    Hunger Vital Sign     Worried About Running Out of Food in the Last Year: Never true     Ran Out of Food in the Last Year: Never true   Transportation Needs: No Transportation Needs (8/18/2020)    PRAPARE - Transportation     Lack of Transportation (Medical): No     Lack of Transportation (Non-Medical): No   Physical Activity: Not on file   Stress: Not on file   Social Connections: Not on file   Intimate Partner Violence: Not on file   Housing Stability: Not on file     History reviewed. No pertinent family history.  No Known Allergies  Current Outpatient Medications   Medication Sig Dispense Refill    amoxicillin (AMOXIL) 500 MG capsule Take 1 capsule by mouth 2 times daily for 10 days 20 capsule 0     No current facility-administered medications for this visit.     PMH, Surgical Hx, Family Hx, and Social Hx reviewed and updated.  Health Maintenance reviewed.    Objective  Vitals:    06/11/24 1047   BP: 104/70   Site: Left Upper Arm   Position: Sitting   Cuff Size: Child   Pulse: 102   Temp: 98.1 °F (36.7 °C)   TempSrc: Oral   SpO2: 99%   Weight: 35.5 kg (78 lb 3.2 oz)   Height: 1.42 m (4' 7.91\")     BP Readings from Last 3 Encounters:   06/11/24 104/70 (66 %, Z = 0.41 /  81 %, Z = 0.88)*   02/22/24 108/62 (82 %, Z = 0.92 /  54 %, Z = 0.10)*   02/12/24 102/70 (61 %, Z = 0.28 /  82 %, Z = 0.92)*     *BP percentiles  Female

## 2024-08-06 NOTE — ED ADULT NURSE NOTE - WAS YOUR LAST COVID-19 VACCINE GREATER THAN OR EQUAL TO TWO MONTHS AGO?
Airway    Performed by: David Gu MD  Authorized by: David Gu MD    Start Time: 8/6/2024 2:38 PM       Yes

## 2024-08-16 NOTE — PATIENT PROFILE ADULT - FUNCTIONAL ASSESSMENT - DAILY ACTIVITY ASSESSMENT TYPE
Admission

## 2024-08-21 NOTE — PHYSICAL THERAPY INITIAL EVALUATION ADULT - THERAPY FREQUENCY, PT EVAL
3-5x/week
Vaccinations/[facility]  Screening Program/  Immunization Record/Breastfeeding Log/Bottle Feeding Log/Breastfeeding Mother’s Support Group Information/Guide to Postpartum Care/[facility] Hearing Screen Program/Back To Sleep Handout/Shaken Baby Prevention Handout/Breastfeeding Guide and Packet/Birth Certificate Instructions/Discharge Medication Information for Patients and Families Pocket Guide
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
## 2024-10-28 NOTE — ED PROVIDER NOTE - INTERNATIONAL TRAVEL
English No Patient requests all Lab, Cardiology, and Radiology Results on their Discharge Instructions

## 2024-10-29 NOTE — ED PROVIDER NOTE - NSICDXPASTSURGICALHX_GEN_ALL_CORE_FT
-- DO NOT REPLY / DO NOT REPLY ALL --  -- This inbox is not monitored. If this was sent to the wrong provider or department, reroute message to P [employer] pool. --  -- Message is from Engagement Center Operations (ECO) --    General Patient Message: medical record request was sent to [facility], they're saying this may be a incorrect fax. They want clarification   Caller Information       Contact Date/Time Type Contact Phone/Fax    10/29/2024 01:56 PM CDT Phone (Incoming) janene (Other) [phone]     [facility]            Alternative phone number:     Can a detailed message be left? Yes - Voicemail   Patient has been advised the message will be addressed within 2-3 business days.                
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

## 2024-11-20 NOTE — PROGRESS NOTE ADULT - REASON FOR ADMISSION
concern for cellulitis Post-Care Instructions: I reviewed with the patient in detail post-care instructions. Patient is to wear sunprotection, and avoid picking at any of the treated lesions. Pt may apply Vaseline to crusted or scabbing areas. Show Aperture Variable?: Yes Number Of Freeze-Thaw Cycles: 3 freeze-thaw cycles Detail Level: Detailed Render Note In Bullet Format When Appropriate: No Consent: The patient's consent was obtained including but not limited to risks of crusting, scabbing, blistering, scarring, darker or lighter pigmentary change, recurrence, incomplete removal and infection. Application Tool (Optional): Liquid Nitrogen Sprayer Duration Of Freeze Thaw-Cycle (Seconds): 3 Medical Necessity Information: It is in your best interest to select a reason for this procedure from the list below. All of these items fulfill various CMS LCD requirements except the new and changing color options. Spray Paint Text: The liquid nitrogen was applied to the skin utilizing a spray paint frosting technique. Medical Necessity Clause: This procedure was medically necessary because the lesions that were treated were:

## 2024-12-04 NOTE — ED ADULT TRIAGE NOTE - AS PAIN REST
C3D2 HiDAC. Son is having eye surgery on 12/5 so will have D3 on 12/6.  Will add a third count check on Saturdays for this cycle given thrombocytopenia & breakthrough menorrhagia. Post C1 NPM1 undetectable, post C2 is pending.  This will be 3/4 cycles of HIDAC as post remission therapy.  Will defer transplant.  Given 5 week recovery and holidays, scheduling subsequent HIDAC cycles every 5 weeks.     4 (moderate pain)

## 2025-02-03 ENCOUNTER — INPATIENT (INPATIENT)
Facility: HOSPITAL | Age: 75
LOS: 9 days | Discharge: NURSING, REHAB OR LONG-TERM CARE | DRG: 761 | End: 2025-02-13
Attending: GENERAL PRACTICE | Admitting: GENERAL PRACTICE
Payer: MEDICARE

## 2025-02-03 VITALS
HEIGHT: 62 IN | TEMPERATURE: 98 F | WEIGHT: 199.96 LBS | HEART RATE: 66 BPM | OXYGEN SATURATION: 96 % | RESPIRATION RATE: 20 BRPM | DIASTOLIC BLOOD PRESSURE: 79 MMHG | SYSTOLIC BLOOD PRESSURE: 142 MMHG

## 2025-02-03 DIAGNOSIS — I25.10 ATHEROSCLEROTIC HEART DISEASE OF NATIVE CORONARY ARTERY WITHOUT ANGINA PECTORIS: ICD-10-CM

## 2025-02-03 DIAGNOSIS — Z98.89 OTHER SPECIFIED POSTPROCEDURAL STATES: Chronic | ICD-10-CM

## 2025-02-03 DIAGNOSIS — Z90.5 ACQUIRED ABSENCE OF KIDNEY: Chronic | ICD-10-CM

## 2025-02-03 DIAGNOSIS — Z90.49 ACQUIRED ABSENCE OF OTHER SPECIFIED PARTS OF DIGESTIVE TRACT: Chronic | ICD-10-CM

## 2025-02-03 DIAGNOSIS — I48.20 CHRONIC ATRIAL FIBRILLATION, UNSPECIFIED: ICD-10-CM

## 2025-02-03 DIAGNOSIS — I89.0 LYMPHEDEMA, NOT ELSEWHERE CLASSIFIED: ICD-10-CM

## 2025-02-03 DIAGNOSIS — Z59.9 PROBLEM RELATED TO HOUSING AND ECONOMIC CIRCUMSTANCES, UNSPECIFIED: ICD-10-CM

## 2025-02-03 DIAGNOSIS — N93.8 OTHER SPECIFIED ABNORMAL UTERINE AND VAGINAL BLEEDING: ICD-10-CM

## 2025-02-03 DIAGNOSIS — N93.9 ABNORMAL UTERINE AND VAGINAL BLEEDING, UNSPECIFIED: ICD-10-CM

## 2025-02-03 DIAGNOSIS — G81.90 HEMIPLEGIA, UNSPECIFIED AFFECTING UNSPECIFIED SIDE: ICD-10-CM

## 2025-02-03 DIAGNOSIS — R52 PAIN, UNSPECIFIED: ICD-10-CM

## 2025-02-03 DIAGNOSIS — J44.9 CHRONIC OBSTRUCTIVE PULMONARY DISEASE, UNSPECIFIED: ICD-10-CM

## 2025-02-03 LAB
ADD ON TEST-SPECIMEN IN LAB: SIGNIFICANT CHANGE UP
ALBUMIN SERPL ELPH-MCNC: 4 G/DL — SIGNIFICANT CHANGE UP (ref 3.3–5)
ALP SERPL-CCNC: 111 U/L — SIGNIFICANT CHANGE UP (ref 40–120)
ALT FLD-CCNC: 14 U/L — SIGNIFICANT CHANGE UP (ref 10–45)
ANION GAP SERPL CALC-SCNC: 11 MMOL/L — SIGNIFICANT CHANGE UP (ref 5–17)
APPEARANCE UR: ABNORMAL
APTT BLD: 97.7 SEC — HIGH (ref 24.5–35.6)
AST SERPL-CCNC: 13 U/L — SIGNIFICANT CHANGE UP (ref 10–40)
BACTERIA # UR AUTO: ABNORMAL /HPF
BASOPHILS # BLD AUTO: 0.03 K/UL — SIGNIFICANT CHANGE UP (ref 0–0.2)
BASOPHILS NFR BLD AUTO: 0.3 % — SIGNIFICANT CHANGE UP (ref 0–2)
BILIRUB SERPL-MCNC: 0.4 MG/DL — SIGNIFICANT CHANGE UP (ref 0.2–1.2)
BILIRUB UR-MCNC: NEGATIVE — SIGNIFICANT CHANGE UP
BUN SERPL-MCNC: 16 MG/DL — SIGNIFICANT CHANGE UP (ref 7–23)
CALCIUM SERPL-MCNC: 10.4 MG/DL — SIGNIFICANT CHANGE UP (ref 8.4–10.5)
CAST: 9 /LPF — HIGH (ref 0–4)
CHLORIDE SERPL-SCNC: 103 MMOL/L — SIGNIFICANT CHANGE UP (ref 96–108)
CO2 SERPL-SCNC: 27 MMOL/L — SIGNIFICANT CHANGE UP (ref 22–31)
COLOR SPEC: ABNORMAL
CREAT SERPL-MCNC: 0.71 MG/DL — SIGNIFICANT CHANGE UP (ref 0.5–1.3)
DIFF PNL FLD: ABNORMAL
EGFR: 88 ML/MIN/1.73M2 — SIGNIFICANT CHANGE UP
EOSINOPHIL # BLD AUTO: 0.15 K/UL — SIGNIFICANT CHANGE UP (ref 0–0.5)
EOSINOPHIL NFR BLD AUTO: 1.7 % — SIGNIFICANT CHANGE UP (ref 0–6)
GLUCOSE SERPL-MCNC: 91 MG/DL — SIGNIFICANT CHANGE UP (ref 70–99)
GLUCOSE UR QL: NEGATIVE MG/DL — SIGNIFICANT CHANGE UP
HCT VFR BLD CALC: 40.1 % — SIGNIFICANT CHANGE UP (ref 34.5–45)
HGB BLD-MCNC: 12.3 G/DL — SIGNIFICANT CHANGE UP (ref 11.5–15.5)
IMM GRANULOCYTES NFR BLD AUTO: 0.3 % — SIGNIFICANT CHANGE UP (ref 0–0.9)
INR BLD: 1.21 RATIO — HIGH (ref 0.85–1.16)
KETONES UR-MCNC: NEGATIVE MG/DL — SIGNIFICANT CHANGE UP
LEUKOCYTE ESTERASE UR-ACNC: ABNORMAL
LYMPHOCYTES # BLD AUTO: 1.75 K/UL — SIGNIFICANT CHANGE UP (ref 1–3.3)
LYMPHOCYTES # BLD AUTO: 19.9 % — SIGNIFICANT CHANGE UP (ref 13–44)
MCHC RBC-ENTMCNC: 28.6 PG — SIGNIFICANT CHANGE UP (ref 27–34)
MCHC RBC-ENTMCNC: 30.7 G/DL — LOW (ref 32–36)
MCV RBC AUTO: 93.3 FL — SIGNIFICANT CHANGE UP (ref 80–100)
MONOCYTES # BLD AUTO: 0.79 K/UL — SIGNIFICANT CHANGE UP (ref 0–0.9)
MONOCYTES NFR BLD AUTO: 9 % — SIGNIFICANT CHANGE UP (ref 2–14)
NEUTROPHILS # BLD AUTO: 6.03 K/UL — SIGNIFICANT CHANGE UP (ref 1.8–7.4)
NEUTROPHILS NFR BLD AUTO: 68.8 % — SIGNIFICANT CHANGE UP (ref 43–77)
NITRITE UR-MCNC: POSITIVE
NRBC # BLD: 0 /100 WBCS — SIGNIFICANT CHANGE UP (ref 0–0)
NRBC BLD-RTO: 0 /100 WBCS — SIGNIFICANT CHANGE UP (ref 0–0)
PH UR: 7.5 — SIGNIFICANT CHANGE UP (ref 5–8)
PLATELET # BLD AUTO: 208 K/UL — SIGNIFICANT CHANGE UP (ref 150–400)
POTASSIUM SERPL-MCNC: 4.3 MMOL/L — SIGNIFICANT CHANGE UP (ref 3.5–5.3)
POTASSIUM SERPL-SCNC: 4.3 MMOL/L — SIGNIFICANT CHANGE UP (ref 3.5–5.3)
PROT SERPL-MCNC: 7.3 G/DL — SIGNIFICANT CHANGE UP (ref 6–8.3)
PROT UR-MCNC: SIGNIFICANT CHANGE UP MG/DL
PROTHROM AB SERPL-ACNC: 13.9 SEC — HIGH (ref 9.9–13.4)
RBC # BLD: 4.3 M/UL — SIGNIFICANT CHANGE UP (ref 3.8–5.2)
RBC # FLD: 14.1 % — SIGNIFICANT CHANGE UP (ref 10.3–14.5)
RBC CASTS # UR COMP ASSIST: 418 /HPF — HIGH (ref 0–4)
REVIEW: SIGNIFICANT CHANGE UP
SODIUM SERPL-SCNC: 141 MMOL/L — SIGNIFICANT CHANGE UP (ref 135–145)
SP GR SPEC: 1.02 — SIGNIFICANT CHANGE UP (ref 1–1.03)
SQUAMOUS # UR AUTO: 2 /HPF — SIGNIFICANT CHANGE UP (ref 0–5)
TROPONIN T, HIGH SENSITIVITY RESULT: 14 NG/L — SIGNIFICANT CHANGE UP (ref 0–51)
UROBILINOGEN FLD QL: 0.2 MG/DL — SIGNIFICANT CHANGE UP (ref 0.2–1)
WBC # BLD: 8.78 K/UL — SIGNIFICANT CHANGE UP (ref 3.8–10.5)
WBC # FLD AUTO: 8.78 K/UL — SIGNIFICANT CHANGE UP (ref 3.8–10.5)
WBC UR QL: 43 /HPF — HIGH (ref 0–5)

## 2025-02-03 PROCEDURE — 99285 EMERGENCY DEPT VISIT HI MDM: CPT | Mod: GC

## 2025-02-03 PROCEDURE — 71045 X-RAY EXAM CHEST 1 VIEW: CPT | Mod: 26

## 2025-02-03 PROCEDURE — 74177 CT ABD & PELVIS W/CONTRAST: CPT | Mod: 26

## 2025-02-03 RX ORDER — TRAMADOL HYDROCHLORIDE 100 MG/1
50 TABLET, EXTENDED RELEASE ORAL
Refills: 0 | Status: DISCONTINUED | OUTPATIENT
Start: 2025-02-03 | End: 2025-02-07

## 2025-02-03 RX ORDER — ACETAMINOPHEN 160 MG/5ML
1000 SUSPENSION ORAL ONCE
Refills: 0 | Status: COMPLETED | OUTPATIENT
Start: 2025-02-03 | End: 2025-02-03

## 2025-02-03 RX ORDER — MAGNESIUM, ALUMINUM HYDROXIDE 200-225/5
30 SUSPENSION, ORAL (FINAL DOSE FORM) ORAL EVERY 4 HOURS
Refills: 0 | Status: DISCONTINUED | OUTPATIENT
Start: 2025-02-03 | End: 2025-02-07

## 2025-02-03 RX ORDER — HYDRALAZINE HCL 100 MG
75 TABLET ORAL
Refills: 0 | Status: DISCONTINUED | OUTPATIENT
Start: 2025-02-03 | End: 2025-02-07

## 2025-02-03 RX ORDER — TRAMADOL HYDROCHLORIDE 100 MG/1
50 TABLET, EXTENDED RELEASE ORAL ONCE
Refills: 0 | Status: DISCONTINUED | OUTPATIENT
Start: 2025-02-03 | End: 2025-02-03

## 2025-02-03 RX ORDER — APIXABAN 5 MG/1
5 TABLET, FILM COATED ORAL EVERY 12 HOURS
Refills: 0 | Status: DISCONTINUED | OUTPATIENT
Start: 2025-02-03 | End: 2025-02-05

## 2025-02-03 RX ORDER — BUSPIRONE HYDROCHLORIDE 5 MG/1
10 TABLET ORAL
Refills: 0 | Status: DISCONTINUED | OUTPATIENT
Start: 2025-02-03 | End: 2025-02-07

## 2025-02-03 RX ORDER — BUDESONIDE 9 MG/1
0.5 TABLET, EXTENDED RELEASE ORAL EVERY 12 HOURS
Refills: 0 | Status: DISCONTINUED | OUTPATIENT
Start: 2025-02-03 | End: 2025-02-07

## 2025-02-03 RX ORDER — ASPIRIN 81 MG/1
81 TABLET, COATED ORAL DAILY
Refills: 0 | Status: DISCONTINUED | OUTPATIENT
Start: 2025-02-03 | End: 2025-02-04

## 2025-02-03 RX ORDER — ONDANSETRON 4 MG/1
4 TABLET, ORALLY DISINTEGRATING ORAL EVERY 8 HOURS
Refills: 0 | Status: DISCONTINUED | OUTPATIENT
Start: 2025-02-03 | End: 2025-02-07

## 2025-02-03 RX ORDER — SACUBITRIL AND VALSARTAN 49; 51 MG/1; MG/1
1 TABLET, FILM COATED ORAL
Refills: 0 | DISCHARGE

## 2025-02-03 RX ORDER — HYDRALAZINE HCL 100 MG
3 TABLET ORAL
Refills: 0 | DISCHARGE

## 2025-02-03 RX ORDER — DULOXETINE 20 MG/1
60 CAPSULE, DELAYED RELEASE ORAL DAILY
Refills: 0 | Status: DISCONTINUED | OUTPATIENT
Start: 2025-02-03 | End: 2025-02-07

## 2025-02-03 RX ORDER — GABAPENTIN 800 MG/1
100 TABLET ORAL THREE TIMES A DAY
Refills: 0 | Status: DISCONTINUED | OUTPATIENT
Start: 2025-02-03 | End: 2025-02-07

## 2025-02-03 RX ORDER — ACETAMINOPHEN 160 MG/5ML
650 SUSPENSION ORAL EVERY 6 HOURS
Refills: 0 | Status: DISCONTINUED | OUTPATIENT
Start: 2025-02-03 | End: 2025-02-07

## 2025-02-03 RX ORDER — MECOBAL/LEVOMEFOLAT CA/B6 PHOS 2-3-35 MG
1 TABLET ORAL DAILY
Refills: 0 | Status: DISCONTINUED | OUTPATIENT
Start: 2025-02-03 | End: 2025-02-07

## 2025-02-03 RX ORDER — PANTOPRAZOLE 20 MG/1
40 TABLET, DELAYED RELEASE ORAL DAILY
Refills: 0 | Status: DISCONTINUED | OUTPATIENT
Start: 2025-02-03 | End: 2025-02-07

## 2025-02-03 RX ORDER — TIOTROPIUM BROMIDE MONOHYDRATE 18 UG/1
2 CAPSULE ORAL; RESPIRATORY (INHALATION) DAILY
Refills: 0 | Status: DISCONTINUED | OUTPATIENT
Start: 2025-02-03 | End: 2025-02-07

## 2025-02-03 RX ORDER — SACUBITRIL AND VALSARTAN 49; 51 MG/1; MG/1
1 TABLET, FILM COATED ORAL
Refills: 0 | Status: DISCONTINUED | OUTPATIENT
Start: 2025-02-03 | End: 2025-02-07

## 2025-02-03 RX ORDER — ATORVASTATIN CALCIUM 80 MG/1
40 TABLET, FILM COATED ORAL AT BEDTIME
Refills: 0 | Status: DISCONTINUED | OUTPATIENT
Start: 2025-02-03 | End: 2025-02-07

## 2025-02-03 RX ORDER — ACETAMINOPHEN, DIPHENHYDRAMINE HCL, PHENYLEPHRINE HCL 325; 25; 5 MG/1; MG/1; MG/1
3 TABLET ORAL AT BEDTIME
Refills: 0 | Status: DISCONTINUED | OUTPATIENT
Start: 2025-02-03 | End: 2025-02-07

## 2025-02-03 RX ORDER — MONTELUKAST SODIUM 5 MG/1
10 TABLET, CHEWABLE ORAL DAILY
Refills: 0 | Status: DISCONTINUED | OUTPATIENT
Start: 2025-02-03 | End: 2025-02-07

## 2025-02-03 RX ORDER — TRAMADOL HYDROCHLORIDE 100 MG/1
1 TABLET, EXTENDED RELEASE ORAL
Refills: 0 | DISCHARGE

## 2025-02-03 RX ORDER — AMLODIPINE BESYLATE 5 MG
5 TABLET ORAL DAILY
Refills: 0 | Status: DISCONTINUED | OUTPATIENT
Start: 2025-02-03 | End: 2025-02-07

## 2025-02-03 RX ORDER — METOPROLOL SUCCINATE 25 MG
25 TABLET, EXTENDED RELEASE 24 HR ORAL
Refills: 0 | Status: DISCONTINUED | OUTPATIENT
Start: 2025-02-03 | End: 2025-02-07

## 2025-02-03 RX ORDER — LEVOTHYROXINE SODIUM 25 UG/1
125 TABLET ORAL DAILY
Refills: 0 | Status: DISCONTINUED | OUTPATIENT
Start: 2025-02-03 | End: 2025-02-07

## 2025-02-03 RX ORDER — ACETAMINOPHEN 160 MG/5ML
1000 SUSPENSION ORAL ONCE
Refills: 0 | Status: DISCONTINUED | OUTPATIENT
Start: 2025-02-03 | End: 2025-02-07

## 2025-02-03 RX ADMIN — Medication 25 MILLIGRAM(S): at 21:41

## 2025-02-03 RX ADMIN — GABAPENTIN 100 MILLIGRAM(S): 800 TABLET ORAL at 21:41

## 2025-02-03 RX ADMIN — TRAMADOL HYDROCHLORIDE 50 MILLIGRAM(S): 100 TABLET, EXTENDED RELEASE ORAL at 21:42

## 2025-02-03 RX ADMIN — APIXABAN 5 MILLIGRAM(S): 5 TABLET, FILM COATED ORAL at 21:41

## 2025-02-03 RX ADMIN — TRAMADOL HYDROCHLORIDE 50 MILLIGRAM(S): 100 TABLET, EXTENDED RELEASE ORAL at 16:27

## 2025-02-03 RX ADMIN — ATORVASTATIN CALCIUM 40 MILLIGRAM(S): 80 TABLET, FILM COATED ORAL at 21:42

## 2025-02-03 RX ADMIN — TRAMADOL HYDROCHLORIDE 50 MILLIGRAM(S): 100 TABLET, EXTENDED RELEASE ORAL at 22:42

## 2025-02-03 RX ADMIN — SACUBITRIL AND VALSARTAN 1 TABLET(S): 49; 51 TABLET, FILM COATED ORAL at 21:42

## 2025-02-03 RX ADMIN — TRAMADOL HYDROCHLORIDE 50 MILLIGRAM(S): 100 TABLET, EXTENDED RELEASE ORAL at 16:37

## 2025-02-03 RX ADMIN — ACETAMINOPHEN 400 MILLIGRAM(S): 160 SUSPENSION ORAL at 20:00

## 2025-02-03 RX ADMIN — Medication 75 MILLIGRAM(S): at 21:41

## 2025-02-03 SDOH — ECONOMIC STABILITY - INCOME SECURITY: PROBLEM RELATED TO HOUSING AND ECONOMIC CIRCUMSTANCES, UNSPECIFIED: Z59.9

## 2025-02-03 NOTE — H&P ADULT - NSHPREVIEWOFSYSTEMS_GEN_ALL_CORE
GEN: no fever, no chills, generalized pain, including chest and abd ( mostly chronic pains)   RESP: occasional SOB ( chronic) , no cough, no sputum  CVS: no chest pain, no palpitations, + chronic edema  GI: no abdominal pain, no nausea  : no dysuria, no frequency.. as above   NEURO: no headache, no dizziness  Derm : no itching, no rash

## 2025-02-03 NOTE — H&P ADULT - HISTORY OF PRESENT ILLNESS
patient is a 74  year old woman, AAOX3, from home, wheelchair-bound and uses cane occasionally , w/ PMHx COPD on 3 L NC at home, Obesity, Hypothyroidism, Diastolic HF on Lasix, Breast cancer s/p surgery, Asthma, Chronic lymphedema, Kidney cancer s/p partial nephrectomy, anxiety, presenting to the ER  with blood in diaper when changed by HHA. appeared as dark red blood in diaper x 3 today. appears to be coming from vaginal area, not rectum. states increased urination yesterday. also reports chest pain started yesterday. nonradiating ( upon further questioning patient reports pain all over the body).  pelvic exam done by ER doctor limited exaxm, vaginal vault visualized in speculum, ?scant blood > CT scan ordered, pending  Upon further discussed with pt's son who is at bedside.. patient lives at home with , who has been gradually becoming more forgetful and falling a lot himself, and unable to care for himself or for he patient ( as the primary care taker for many years)...   pt's son Alexander offered fot them both to move in with him but they refused and patient and  have also been refusing to go to see their doctor and kept cancelling their appointments..  at this pont patient and son in agreement that patient and  ( also being admitted) would need to be placed together into a long term care center..    patient is a 74  year old woman, AAOX3, from home, wheelchair-bound and uses cane occasionally , w/ PMHx COPD on 3 L NC at home, Obesity, Hypothyroidism, Diastolic HF previously on Lasix, Afib on Eliquis, Breast cancer s/p surgery, Asthma, Chronic lymphedema, Kidney cancer s/p partial nephrectomy, anxiety, presenting to the ER  with blood in diaper when changed by HHA. appeared as dark red blood in diaper x 3 today. appears to be coming from vaginal area, not rectum. states increased urination yesterday. also reports chest pain started yesterday. nonradiating ( upon further questioning patient reports pain all over the body).  pelvic exam done by ER doctor limited exaxm, vaginal vault visualized in speculum, ?scant blood > CT scan ordered, pending  Upon further discussed with pt's son who is at bedside.. patient lives at home with , who has been gradually becoming more forgetful and falling a lot himself, and unable to care for himself or for he patient ( as the primary care taker for many years)...   pt's son Alexander offered fot them both to move in with him but they refused and patient and  have also been refusing to go to see their doctor and kept cancelling their appointments..  at this pont patient and son in agreement that patient and  ( also being admitted) would need to be placed together into a long term care center..

## 2025-02-03 NOTE — ED PROVIDER NOTE - PROGRESS NOTE DETAILS
Spoke with patient's son who states that patient primary caregivers a  who now has signs of advancing dementia and is no longer able to care for her and himself at home.  Requesting admission for nursing home placement.  Will admit. Sandra Maddox DO (PGY-2)

## 2025-02-03 NOTE — ED PROVIDER NOTE - CLINICAL SUMMARY MEDICAL DECISION MAKING FREE TEXT BOX
Micah: 74 year old female  AOX3, from home, wheelchair-bound and uses cane occasionally , w/ PMHx COPD on 3 L NC at home, Obesity, Hypothyroidism, Diastolic HF on Lasix, Breast cancer s/p surgery, Asthma, Chronic lymphedema, Kidney cancer s/p partial nephrectomy discharged from [facility] on 2/16/24 for CHF exacerbation and new Afib with RVR discharged on Eliquis here with blood in diaper when changed by HHA. appeared as dark red blood in diaper x 3 today. appears to be coming from vaginal area, not rectum. states increased urination yesterday. PE: alert, nad, nonlabored respirations, abdomen obese, soft, nt/nd, b/l le edema. + 2 dp b/l le, alert and oriented x 3.   plan; ivania get labs, ua, urine culture, r/o uti, r/o anemia, reassess. Micah: 74 year old female  AOX3, from home, wheelchair-bound and uses cane occasionally , w/ PMHx COPD on 3 L NC at home, Obesity, Hypothyroidism, Diastolic HF on Lasix, Breast cancer s/p surgery, Asthma, Chronic lymphedema, Kidney cancer s/p partial nephrectomy discharged from [facility] on 2/16/24 for CHF exacerbation and new Afib with RVR discharged on Eliquis here with blood in diaper when changed by HHA. appeared as dark red blood in diaper x 3 today. appears to be coming from vaginal area, not rectum. states increased urination yesterday. also reports chest pain started yesterday. nonradiating. PE: alert, nad, nonlabored respirations, abdomen obese, soft, nt/nd, b/l le edema. + 2 dp b/l le, chronic wounds to le, , alert and oriented x 3, limited movement of le. able to move upper extremities b/l.    plan; ivania get labs, ua, urine culture, r/o uti, r/o anemia, will get cardiac enzymes kg. may need admission give cp and cardiac risk factors. difficult for patient to get around to go to outpatient f/u. also will get ct imaging to r/o uterine pathology. Micah: 74 year old female  AOX3, from home, wheelchair-bound and uses cane occasionally , w/ PMHx COPD on 3 L NC at home, Obesity, Hypothyroidism, Diastolic HF on Lasix, Breast cancer s/p surgery, Asthma, Chronic lymphedema, Kidney cancer s/p partial nephrectomy discharged from [facility] on 2/16/24 for CHF exacerbation and new Afib with RVR discharged on Eliquis here with blood in diaper when changed by HHA. appeared as dark red blood in diaper x 3 today. appears to be coming from vaginal area, not rectum. states increased urination yesterday. also reports chest pain started yesterday. nonradiating. PE: alert, nad, nonlabored respirations, abdomen obese, soft, nt/nd, b/l le edema. + 2 dp b/l le, chronic wounds to le, , alert and oriented x 3, limited movement of le. able to move upper extremities b/l.  pelvic exam done by Dr. Pugh, chaperone by myself: limited exaxm, vaginal vault visualized in speculum.   plan; ivania get labs, ua, urine culture, r/o uti, r/o anemia, will get cardiac enzymes kg. may need admission give cp and cardiac risk factors. difficult for patient to get around to go to outpatient f/u. also will get ct imaging to r/o uterine pathology.

## 2025-02-03 NOTE — H&P ADULT - PROBLEM SELECTOR PLAN 3
chronic  continue Tramadol PRN  chest pain nonspecific   Troponin  :: 14  <<--, 13    Pro-BNP: 283 (02-03-25 @ 15:22)  cardio to evaluation as well given history

## 2025-02-03 NOTE — H&P ADULT - NSHPPHYSICALEXAM_GEN_ALL_CORE
Height (cm): 157.5  Weight (kg): 90.7  BMI (kg/m2): 36.6  Vital Signs Last 24 HrsT(C): 36.4 (02-03-25 @ 20:42)  T(F): 97.6 (02-03-25 @ 20:42), Max: 98 (02-03-25 @ 19:35)  HR: 71 (02-03-25 @ 20:42) (66 - 73)  BP: 129/72 (02-03-25 @ 20:42)  RR: 20 (02-03-25 @ 19:35) (18 - 20)  SpO2: 95% (02-03-25 @ 19:35) (93% - 98%)      GEN: A&O X 3 , NAD , comfortable, pleasant, a bit nervous appearing ( baseline)   HEENT: NCAT, PERRL, MMM, hearing intact  Neck: supple , no JVD appreciated   CVS: S1S2 , regular , No M/R/G appreciated  PULM: slightly rhodochrous.. limited exam as not taking deep breaths   ABD.: soft. non tender, non distended,  obese  Extrem: intact pulses , + chronic lymphedema   Derm: No rash , no ecchymoses. + stasis changes on legs   PSYCH : normal mood,  no delusion a bit anxious

## 2025-02-03 NOTE — H&P ADULT - ASSESSMENT
ASSESSMENT    HPI: 73 year old female AAOX3, from home, wheelchair-bound and uses cane occasionally , w/ PMHx COPD on 3 L NC at home, Obesity, Hypothyroidism, Diastolic HF on Lasix, Breast cancer s/p surgery, Asthma, Chronic lymphedema, Kidney cancer s/p partial nephrectomy discharged from [facility] on 2/16/24 for chf exacerbation and new Afib with RVR discharged on Eliquis presenting to ED after a fall at home earlier today admitted for hypotension likely septic vs hypovolemic shock     _________CNS___________  #Pain  -Acute rib fractures  -Tylenol PRN     _________CVS___________  #Septic Shock vs Hypovolemic shock?   -UA positive for infection, c/w abx  -Hb 7, baseline around 12 in beginning of february 2024   -s/p 2L NS in ED. WIll order another 1LNS  -started on peripheral levo in ED, titrate off. if not possible, place Central line   -f/u cultures, f/u echo      #CHF   -bnp 10 K   -f/u echo  -hold home meds while NPO incase of urgent HD     #AFib with RVR  -hold all anticoagulation     _________RESP__________  #COPD  -on 3LNC at home  -no acute issues    ___________GI____________  #GI bleed?  -pt denies any blood in stool or any bloody vomit  -drop in Hb possibly GI  -repeat CBC stat  -keep NPO  -Protonix BID,     ________ RENAL__________  #ALDAIR  -likely prerenal  -f/u urine lytes  -IVF   FC    __________MSK___________  #Fall  -PT consult      ___________ID____________  #Septic shock?  -source UTI  -abx cefepime  -f;u urine cx and blood cx     _________ENDO__________  #hypothyroidism  -f/u TSH  -c/w synthroid ( IV while NPO)     ______HEME/ONC_______  #Anemia  -Hb 7, baseline 12.5 around begining of february however Hb trended down prior to discharge a few days ago ( was 8.6 day of d/c)   -repeat CBC stat  -f/u iron studies  -transfuse PRBC if Hb  <7     _________SKIN____________  #RLE ulcer  -podiatry consult  s/p 1 dose vanc in ED   -dressing change    #Sacral ulcer  -frequent positioning      ________PROPHY_______  #DVT SCD  #GI PPI      ______GOC/DISPO___________  ICU  FULL CODE    patient is a 74  year old woman, AAOX3, from home, wheelchair-bound and uses cane occasionally , w/ PMHx COPD on 3 L NC at home, Obesity, Hypothyroidism, Diastolic HF previously on Lasix, Afib on Eliquis, Breast cancer s/p surgery, Asthma, Chronic lymphedema, Kidney cancer s/p partial nephrectomy, anxiety, presenting to the ER  with blood in diaper when changed by HHA. appeared as dark red blood in diaper x 3 today. appears to be coming from vaginal area, not rectum. states increased urination yesterday. also reports chest pain started yesterday. nonradiating ( upon further questioning patient reports pain all over the body).  pelvic exam done by ER doctor limited exaxm, vaginal vault visualized in speculum, ?scant blood > CT scan ordered, pending  Upon further discussed with pt's son who is at bedside.. patient lives at home with , who has been gradually becoming more forgetful and falling a lot himself, and unable to care for himself or for he patient ( as the primary care taker for many years)...   pt's son Alexander offered fot them both to move in with him but they refused and patient and  have also been refusing to go to see their doctor and kept cancelling their appointments..  at this pont patient and son in agreement that patient and  ( also being admitted) would need to be placed together into a long term care center..

## 2025-02-03 NOTE — H&P ADULT - ATTENDING COMMENTS
73 year old female AAOX3, from home, wheelchair-bound and uses cane occasionally , w/ PMHx COPD on 3 L NC at home, Obesity, Hypothyroidism, Diastolic HF on Lasix, Breast cancer s/p surgery, Asthma, Chronic lymphedema, Kidney cancer s/p partial nephrectomy discharged from [facility] on 2/16/24 for chf exacerbation and new Afib with RVR discharged on Eliquis presenting to ED after a fall at home earlier today admitted for hypotension likely septic vs hypovolemic shock.    Exam with multiple cutaneous hematomas. Chronic pedal edema. States has been bruising for a while now. Was started on anticoagulation recently for new onset afib. Labs significant for ALDAIR, lactic acidosis, worsening anemia.     CT scans performed in the ED suggestive of multiple acute and chronic rib fractures. Cutaneous hematomas. UA noted to be positive, concerning for UTI.     Post volume resuscitation assessment performed, now on vasopressor support     Assessment:  1. Septic Shock  2. Lactic acidosis   3. Acute kidney injury likely ATN   4. UTI  5. Acute on chronic anemia   6. Multiple hematomas   7. Recurrent falls  8. Heart failure with preserved EF   9. COPD   10. Chronic afib     Plan:  - POCUS with collapsable IVC   - Will continue IV fluid resuscitation  - Hold diuretics and antihypertensives  - Hemodynamic monitoring  - Repeat ECHO   - Hold anticoagulation  - Trend hgb  - Obtain cultures  - Broad spectrum antibiotics  - Cont. home COPD medications  - Will admit to ICU  - Podiatry and wound care consult  - Admit to ICU

## 2025-02-03 NOTE — H&P ADULT - PROBLEM SELECTOR PLAN 2
CT scan done in ED, pending read  evaluation for mass / fibroid or other causes of bleeding  continuing Eliquis for now > stop if gross bleeding  may need GYN evaluation pending CT scan   check    monitor  ( previously had UTIs as well but currently asymptomatic )

## 2025-02-03 NOTE — ED ADULT NURSE REASSESSMENT NOTE - NS ED NURSE REASSESS COMMENT FT1
Report received from ADDI Nichols. On re-assessment pt currently resting in stretcher in room with appropriate side rails up for safety. pt is awake and alert, vital signs stable. Plan of care discussed with pt - pt c/o pain, Ofirmev to be given. Pt pending bed upstairs.

## 2025-02-03 NOTE — ED ADULT NURSE NOTE - OBJECTIVE STATEMENT
73 y/o female AOx3 with pmhx of COPD, HTN, and taking Levothyroxine for Hypothyroidism c/o vaginal bleeding this morning. Pt describes as brownish red but unable to decipher if it is from bowel movements as well. Pt endorses "I've been having chest pain in the center of my chest yesterday that radiates to my left arm. Now I do not have any chest pain but my left arm and both my legs hurt." Pt currently denies chest pain, SOB, dyspnea, tingling in extremities, N/V/D, dysuria, fevers/chills, abdominal pain and headaches. Lung sounds clear in all lobes, breathing spontaneously and unlabored, abdomen soft, non-distended, and non-tender. Bowel sounds present in all quadrants. Full ROM and strength in all extremities. Pt has a wound dressing on right lower leg, states "I have a lesion that I put antibiotic cream on." Pt currently laying in stretcher bed with side rails up, VS stable, IV placed. Safety and comfort provided.

## 2025-02-03 NOTE — ED ADULT NURSE NOTE - NSFALLUNIVINTERV_ED_ALL_ED
Bed/Stretcher in lowest position, wheels locked, appropriate side rails in place/Call bell, personal items and telephone in reach/Instruct patient to call for assistance before getting out of bed/chair/stretcher/Non-slip footwear applied when patient is off stretcher/[place] to call system/Physically safe environment - no spills, clutter or unnecessary equipment/Purposeful proactive rounding/Room/bathroom lighting operational, light cord in reach

## 2025-02-03 NOTE — PATIENT PROFILE ADULT - FALL HARM RISK - HARM RISK INTERVENTIONS

## 2025-02-03 NOTE — H&P ADULT - PROBLEM SELECTOR PLAN 5
patient chronically bed / WC bound, can't really ambulate : total care  bed > chair and peter to chair as able  turn and position / pressure ulcer precautions

## 2025-02-03 NOTE — H&P ADULT - PROBLEM SELECTOR PLAN 8
previously on diuretics > not on med list now!    > elevation, increase activity as able  will consider ACE wrapping  diuretics as needed

## 2025-02-03 NOTE — H&P ADULT - CONVERSATION DETAILS
*** Advance directive /  goals of care discussion      I had a long discussion with patient ( and family) about patient's overall diagnosis, expected prognosis, and potential complications.       Discussed treatment options, comfort care / hospice as appropriate, and all other potential options of care.         Discussed risks, benefits, and alternatives of treatment as well.          Opportunity given for and all questions answered.            Reviewed available advance directives as available >      At this time patient to be > full code, with continuation of current medical therapy.     Goal is for pt to return > home and follow up as outpatient with current doctors      will continue to discuss GOC with pt and family and update plan as needed.     Patient's family have my contact information and will contact me with questions.     Additional time spent on Goals of care: 22 min.       ( Date of service: 01/01/2025 12:00 PM) *** Advance directive /  goals of care discussion      I had a long discussion with patient ( and son) about patient's overall diagnosis, expected prognosis, and potential complications.       Discussed treatment options, comfort care / hospice as appropriate, and all other potential options of care.         Discussed risks, benefits, and alternatives of treatment as well.          Opportunity given for and all questions answered.            Reviewed available advance directives as available > patient's son/ HCP/ POA: Alexander [phone] is at bedside and can bring copies of documents from home.. he is not aware of any other documents, including MOLST..   per review of chart, patient was last in [facility] and on admission patient had requested to be FULL CODE, as now, but being patient was a "complex care" a palliative consult was requested and conversation mentions MOLST and DNR but I can not find a copy of MOLST in the chart archive and son is unaware and is in agreement with FULL CODE for now      At this time patient to be > full code, with continuation of current medical therapy.     Goal is for pt to return > home and follow up as outpatient with current doctors      will continue to discuss GOC with pt and family and update plan as needed.     Patient's family have my contact information and will contact me with questions.     Additional time spent on Goals of care: 22 min.   ( Date of service: 02/02/2025 6:00 PM)

## 2025-02-04 LAB
A1C WITH ESTIMATED AVERAGE GLUCOSE RESULT: 5.4 % — SIGNIFICANT CHANGE UP (ref 4–5.6)
ALBUMIN SERPL ELPH-MCNC: 3.4 G/DL — SIGNIFICANT CHANGE UP (ref 3.3–5)
ALP SERPL-CCNC: 98 U/L — SIGNIFICANT CHANGE UP (ref 40–120)
ALT FLD-CCNC: 10 U/L — SIGNIFICANT CHANGE UP (ref 10–45)
ANION GAP SERPL CALC-SCNC: 13 MMOL/L — SIGNIFICANT CHANGE UP (ref 5–17)
AST SERPL-CCNC: 14 U/L — SIGNIFICANT CHANGE UP (ref 10–40)
BASOPHILS # BLD AUTO: 0.03 K/UL — SIGNIFICANT CHANGE UP (ref 0–0.2)
BASOPHILS NFR BLD AUTO: 0.4 % — SIGNIFICANT CHANGE UP (ref 0–2)
BILIRUB SERPL-MCNC: 0.6 MG/DL — SIGNIFICANT CHANGE UP (ref 0.2–1.2)
BUN SERPL-MCNC: 16 MG/DL — SIGNIFICANT CHANGE UP (ref 7–23)
CALCIUM SERPL-MCNC: 10 MG/DL — SIGNIFICANT CHANGE UP (ref 8.4–10.5)
CANCER AG125 SERPL-ACNC: 11 U/ML — SIGNIFICANT CHANGE UP
CHLORIDE SERPL-SCNC: 104 MMOL/L — SIGNIFICANT CHANGE UP (ref 96–108)
CHOLEST SERPL-MCNC: 145 MG/DL — SIGNIFICANT CHANGE UP
CO2 SERPL-SCNC: 24 MMOL/L — SIGNIFICANT CHANGE UP (ref 22–31)
CREAT SERPL-MCNC: 0.7 MG/DL — SIGNIFICANT CHANGE UP (ref 0.5–1.3)
EGFR: 90 ML/MIN/1.73M2 — SIGNIFICANT CHANGE UP
EOSINOPHIL # BLD AUTO: 0.15 K/UL — SIGNIFICANT CHANGE UP (ref 0–0.5)
EOSINOPHIL NFR BLD AUTO: 2.2 % — SIGNIFICANT CHANGE UP (ref 0–6)
ESTIMATED AVERAGE GLUCOSE: 108 MG/DL — SIGNIFICANT CHANGE UP (ref 68–114)
GLUCOSE SERPL-MCNC: 69 MG/DL — LOW (ref 70–99)
HCT VFR BLD CALC: 36.1 % — SIGNIFICANT CHANGE UP (ref 34.5–45)
HDLC SERPL-MCNC: 42 MG/DL — LOW
HGB BLD-MCNC: 11.1 G/DL — LOW (ref 11.5–15.5)
IMM GRANULOCYTES NFR BLD AUTO: 0.3 % — SIGNIFICANT CHANGE UP (ref 0–0.9)
LIPID PNL WITH DIRECT LDL SERPL: 85 MG/DL — SIGNIFICANT CHANGE UP
LYMPHOCYTES # BLD AUTO: 1.75 K/UL — SIGNIFICANT CHANGE UP (ref 1–3.3)
LYMPHOCYTES # BLD AUTO: 25.3 % — SIGNIFICANT CHANGE UP (ref 13–44)
MCHC RBC-ENTMCNC: 28.5 PG — SIGNIFICANT CHANGE UP (ref 27–34)
MCHC RBC-ENTMCNC: 30.7 G/DL — LOW (ref 32–36)
MCV RBC AUTO: 92.8 FL — SIGNIFICANT CHANGE UP (ref 80–100)
MONOCYTES # BLD AUTO: 0.77 K/UL — SIGNIFICANT CHANGE UP (ref 0–0.9)
MONOCYTES NFR BLD AUTO: 11.1 % — SIGNIFICANT CHANGE UP (ref 2–14)
NEUTROPHILS # BLD AUTO: 4.2 K/UL — SIGNIFICANT CHANGE UP (ref 1.8–7.4)
NEUTROPHILS NFR BLD AUTO: 60.7 % — SIGNIFICANT CHANGE UP (ref 43–77)
NON HDL CHOLESTEROL: 103 MG/DL — SIGNIFICANT CHANGE UP
NRBC # BLD: 0 /100 WBCS — SIGNIFICANT CHANGE UP (ref 0–0)
NRBC BLD-RTO: 0 /100 WBCS — SIGNIFICANT CHANGE UP (ref 0–0)
PLATELET # BLD AUTO: 176 K/UL — SIGNIFICANT CHANGE UP (ref 150–400)
POTASSIUM SERPL-MCNC: 3.9 MMOL/L — SIGNIFICANT CHANGE UP (ref 3.5–5.3)
POTASSIUM SERPL-SCNC: 3.9 MMOL/L — SIGNIFICANT CHANGE UP (ref 3.5–5.3)
PROT SERPL-MCNC: 6.4 G/DL — SIGNIFICANT CHANGE UP (ref 6–8.3)
RBC # BLD: 3.89 M/UL — SIGNIFICANT CHANGE UP (ref 3.8–5.2)
RBC # FLD: 14.3 % — SIGNIFICANT CHANGE UP (ref 10.3–14.5)
SODIUM SERPL-SCNC: 141 MMOL/L — SIGNIFICANT CHANGE UP (ref 135–145)
TRIGL SERPL-MCNC: 93 MG/DL — SIGNIFICANT CHANGE UP
WBC # BLD: 6.92 K/UL — SIGNIFICANT CHANGE UP (ref 3.8–10.5)
WBC # FLD AUTO: 6.92 K/UL — SIGNIFICANT CHANGE UP (ref 3.8–10.5)

## 2025-02-04 PROCEDURE — 76856 US EXAM PELVIC COMPLETE: CPT | Mod: 26

## 2025-02-04 PROCEDURE — 72197 MRI PELVIS W/O & W/DYE: CPT | Mod: 26

## 2025-02-04 RX ORDER — CEFTRIAXONE 250 MG/1
1000 INJECTION, POWDER, FOR SOLUTION INTRAMUSCULAR; INTRAVENOUS EVERY 24 HOURS
Refills: 0 | Status: COMPLETED | OUTPATIENT
Start: 2025-02-04 | End: 2025-02-06

## 2025-02-04 RX ADMIN — Medication 5 MILLIGRAM(S): at 05:56

## 2025-02-04 RX ADMIN — Medication 25 MILLIGRAM(S): at 05:56

## 2025-02-04 RX ADMIN — Medication 75 MILLIGRAM(S): at 18:09

## 2025-02-04 RX ADMIN — TRAMADOL HYDROCHLORIDE 50 MILLIGRAM(S): 100 TABLET, EXTENDED RELEASE ORAL at 03:57

## 2025-02-04 RX ADMIN — Medication 75 MILLIGRAM(S): at 05:56

## 2025-02-04 RX ADMIN — ASPIRIN 81 MILLIGRAM(S): 81 TABLET, COATED ORAL at 11:29

## 2025-02-04 RX ADMIN — TRAMADOL HYDROCHLORIDE 50 MILLIGRAM(S): 100 TABLET, EXTENDED RELEASE ORAL at 16:21

## 2025-02-04 RX ADMIN — Medication 1 APPLICATION(S): at 05:57

## 2025-02-04 RX ADMIN — APIXABAN 5 MILLIGRAM(S): 5 TABLET, FILM COATED ORAL at 18:10

## 2025-02-04 RX ADMIN — BUDESONIDE 0.5 MILLIGRAM(S): 9 TABLET, EXTENDED RELEASE ORAL at 18:09

## 2025-02-04 RX ADMIN — Medication 1 APPLICATION(S): at 18:10

## 2025-02-04 RX ADMIN — CEFTRIAXONE 100 MILLIGRAM(S): 250 INJECTION, POWDER, FOR SOLUTION INTRAMUSCULAR; INTRAVENOUS at 08:52

## 2025-02-04 RX ADMIN — MONTELUKAST SODIUM 10 MILLIGRAM(S): 5 TABLET, CHEWABLE ORAL at 11:30

## 2025-02-04 RX ADMIN — Medication 25 MILLIGRAM(S): at 18:11

## 2025-02-04 RX ADMIN — TRAMADOL HYDROCHLORIDE 50 MILLIGRAM(S): 100 TABLET, EXTENDED RELEASE ORAL at 04:57

## 2025-02-04 RX ADMIN — TIOTROPIUM BROMIDE MONOHYDRATE 2 PUFF(S): 18 CAPSULE ORAL; RESPIRATORY (INHALATION) at 11:34

## 2025-02-04 RX ADMIN — Medication 1 TABLET(S): at 11:30

## 2025-02-04 RX ADMIN — Medication 1 APPLICATION(S): at 06:04

## 2025-02-04 RX ADMIN — ACETAMINOPHEN, DIPHENHYDRAMINE HCL, PHENYLEPHRINE HCL 3 MILLIGRAM(S): 325; 25; 5 TABLET ORAL at 21:19

## 2025-02-04 RX ADMIN — LEVOTHYROXINE SODIUM 125 MICROGRAM(S): 25 TABLET ORAL at 05:56

## 2025-02-04 RX ADMIN — APIXABAN 5 MILLIGRAM(S): 5 TABLET, FILM COATED ORAL at 05:56

## 2025-02-04 RX ADMIN — GABAPENTIN 100 MILLIGRAM(S): 800 TABLET ORAL at 14:05

## 2025-02-04 RX ADMIN — ATORVASTATIN CALCIUM 40 MILLIGRAM(S): 80 TABLET, FILM COATED ORAL at 21:16

## 2025-02-04 RX ADMIN — DULOXETINE 60 MILLIGRAM(S): 20 CAPSULE, DELAYED RELEASE ORAL at 11:29

## 2025-02-04 RX ADMIN — BUDESONIDE 0.5 MILLIGRAM(S): 9 TABLET, EXTENDED RELEASE ORAL at 05:57

## 2025-02-04 RX ADMIN — SACUBITRIL AND VALSARTAN 1 TABLET(S): 49; 51 TABLET, FILM COATED ORAL at 05:57

## 2025-02-04 RX ADMIN — GABAPENTIN 100 MILLIGRAM(S): 800 TABLET ORAL at 05:56

## 2025-02-04 RX ADMIN — GABAPENTIN 100 MILLIGRAM(S): 800 TABLET ORAL at 21:16

## 2025-02-04 RX ADMIN — BUSPIRONE HYDROCHLORIDE 10 MILLIGRAM(S): 5 TABLET ORAL at 11:30

## 2025-02-04 RX ADMIN — SACUBITRIL AND VALSARTAN 1 TABLET(S): 49; 51 TABLET, FILM COATED ORAL at 18:08

## 2025-02-04 NOTE — CONSULT NOTE ADULT - CONSULT REQUESTED BY NAME
[FreeTextEntry1] : Ms. GUAJARDO is a 59-year-old, nonsmoking, female with a history of COVID 19 (2023), asthma, Osteoporosis, SOB, Active asthma, underlying allergies, nocturnal muscle cramps, poor sleep (negative for HARDIK via HST). She presents for a follow up visit. Her chief concern is continuing to take medications.   1. Asthma: - Add Albuterol HFA 2 puffs Q6H PRN. - Restart Trelegy 100 mcg 1 inhale Q AM.  2-month sample supplied to patient.  Advised patient I will also send over Breo 100 mcg for her to price out to see if it is covered.  Discussed that based on wheezing heard at today's visit and initial visit along with pulmonary function test performed at both visits it is recommended to remain on maintenance inhaler therapy.  Patient seemed agreeable to start on Trelegy for 2 months and then decrease to Breo as it is decreasing the amount of medications and the inhaler from 3 to 2.   2. Allergy/ SInus - s/p Blood work to include: asthma panel, food IgE panel, IgE level, eosinophil level,vitamin D level - continue Ryaltris nasal spray 1 sniff BID.   3. poor sleep; ? HARDIK (family hx of dementia): - complete home sleep study - I have discussed all the negative health consequences associated with obstructive and central sleep apnea including heart conditions/MI, hypertension, diabetes, chronic inflammation, memory issues, stroke, obesity, decreased libido, sleep related accidents, as well as anxiety and depression.  - Additional recommendations included: Avoid alcohol and sedatives at bedtime. Proper sleep hygiene such as maintaining a regular sleep routine, avoiding naps if possible, not watching TV or reading in bed,  and maintaining a quiet, comfortable bedroom. Sleepy driving avoidance and risks discussed with patient.   Patient to follow up with Dr. Calvert as scheduled. Patient to call with further questions and concerns. Patient verbalizes understanding of care plan and is agreeable.
dr trevino
Medicine

## 2025-02-04 NOTE — CONSULT NOTE ADULT - SUBJECTIVE AND OBJECTIVE BOX
GYN Consult Note    74y P3 postmenopausal female with significant comorbidities including COPD, Afib on Eliquis, diastolic HF and functional hemiparesis, for whom inpatient gyn team consulted for evaluation of possible vaginal bleeding. Patient reports that her home health aide noticed blood on her diaper yesterday, and she had continued spotting through 3 diaper changes. She is unsure if this occurred earlier than yesterday. Denies abdominal pain, lightheadedness, dizziness, nausea, vomiting, changes in appetite or weight, or recent changes to medication. She does note that she has been having increased urination in the last several days. She also notes x2 episodes of diarrhea last week and the week before.   Patient states she has been in menopause for about 20 years. Has had urinary incontinence for at least the last 8 years.       OB/GYN HISTORY:   Physician: Dr. Maurice ([facility]) - has not seen in over a decade  Ob:  - FT  x3 (one son passed away), TOP x2 with D&C x2  Gyn: denies history of abnormal pap smears, ovarian cysts, or fibroids; last pap smear >20 years ago; history of 1 sexual partner, not sexually active in >10 years  - patient states she had history of "gyn surgery" to fix incontinence many years ago, but she does not recall what kind of surgery or who surgeon was; in automated history, there is noted to be h/o sling procedure  PMH: L breast CA DCIS s/p lumpectomy (), kidney CA s/p partial L nephrectomy (unsure of year); COPD - severe; asthma; Graves disease s/p mult eye surgeries; HTN; HLD; obesity; sleep apnea; h/o DVT  PSH: L lumpectomy; partial L nephrectomy; cholecystectomy; parathryoidectomy; multiple eye surgeries; laminectomy    Meds:  MEDICATIONS  (STANDING):  acetaminophen   IVPB .. 1000 milliGRAM(s) IV Intermittent once  amLODIPine   Tablet 5 milliGRAM(s) Oral daily  apixaban 5 milliGRAM(s) Oral every 12 hours  atorvastatin 40 milliGRAM(s) Oral at bedtime  buDESOnide    Inhalation Suspension 0.5 milliGRAM(s) Inhalation every 12 hours  busPIRone 10 milliGRAM(s) Oral <User Schedule>  cefTRIAXone   IVPB 1000 milliGRAM(s) IV Intermittent every 24 hours  DULoxetine 60 milliGRAM(s) Oral daily  gabapentin 100 milliGRAM(s) Oral three times a day  hydrALAZINE 75 milliGRAM(s) Oral two times a day  levothyroxine 125 MICROGram(s) Oral daily  metoprolol tartrate 25 milliGRAM(s) Oral two times a day  montelukast 10 milliGRAM(s) Oral daily  multivitamin 1 Tablet(s) Oral daily  nystatin/triamcinolone Cream 1 Application(s) Topical two times a day  pantoprazole  Injectable 40 milliGRAM(s) IV Push daily  sacubitril 24 mG/valsartan 26 mG 1 Tablet(s) Oral two times a day  tiotropium 2.5 MICROgram(s) Inhaler 2 Puff(s) Inhalation daily  zinc oxide 20% Ointment 1 Application(s) Topical two times a day    MEDICATIONS  (PRN):  acetaminophen     Tablet .. 650 milliGRAM(s) Oral every 6 hours PRN Temp greater or equal to 38C (100.4F), Mild Pain (1 - 3)  aluminum hydroxide/magnesium hydroxide/simethicone Suspension 30 milliLiter(s) Oral every 4 hours PRN Dyspepsia  melatonin 3 milliGRAM(s) Oral at bedtime PRN Insomnia  ondansetron Injectable 4 milliGRAM(s) IV Push every 8 hours PRN Nausea and/or Vomiting  traMADol 50 milliGRAM(s) Oral two times a day PRN Moderate Pain (4 - 6)    Allergies: sulfa drugs (Unknown)  Peaches (Hives)  shellfish (Hives)  Grapes (Hives)	      Vital Signs Last 24 Hrs  T(C): 36.7 (2025 05:35), Max: 36.7 (2025 19:35)  T(F): 98 (2025 05:35), Max: 98 (2025 19:35)  HR: 64 (2025 05:35) (64 - 73)  BP: 129/64 (2025 05:35) (129/64 - 161/65)  BP(mean): --  RR: 18 (2025 05:35) (18 - 20)  SpO2: 96% (2025 05:35) (93% - 98%)    Parameters below as of 2025 05:35  Patient On (Oxygen Delivery Method): nasal cannula  O2 Flow (L/min): 2      PHYSICAL EXAM: Chaperoned w/ RN Sabine at bedside.   Gen: NAD, alert and oriented x 3  Cardiovascular: regular   Respiratory: breathing comfortably on RA  Abd: soft, non tender, non-distended  Speculum exam: continued urinary flow from urethra obstructing view of vagina; canal without obvious blood and some urine; unable to visualize cervix due to patient not tolerating positioning and further speculum exam  Bimanual: cervix palpated grossly normal, no masses in bilateral adnexa, no uterine tenderness  Extremities: NTBL  Skin: warm and well perfused      LABS:                        11.1   6.92  )-----------( 176      ( 2025 07:32 )             36.1     02-04    141  |  104  |  16  ----------------------------<  69[L]  3.9   |  24  |  0.70    Ca    10.0      2025 07:32    TPro  6.4  /  Alb  3.4  /  TBili  0.6  /  DBili  x   /  AST  14  /  ALT  10  /  AlkPhos  98  02-04    PT/INR - ( 2025 15:22 )   PT: 13.9 sec;   INR: 1.21 ratio         PTT - ( 2025 15:22 )  PTT:97.7 sec  Urinalysis Basic - ( 2025 07:32 )    Color: x / Appearance: x / SG: x / pH: x  Gluc: 69 mg/dL / Ketone: x  / Bili: x / Urobili: x   Blood: x / Protein: x / Nitrite: x   Leuk Esterase: x / RBC: x / WBC x   Sq Epi: x / Non Sq Epi: x / Bacteria: x        RADIOLOGY & ADDITIONAL STUDIES:    < from: CT Abdomen and Pelvis w/ IV Cont (25 @ 19:14) >  BLADDER: Minimally distended with wall thickening and inflammation.   Correlate for cystitis.  REPRODUCTIVE ORGANS: Uterus and adnexa are suboptimally characterized on   CT. Air is noted within the endometrial canal. Endovaginal canal is   partially filled with fluid. These findings are of unclear clinical   significance. GYN evaluation is recommended given the history of vaginal   bleeding.    IMPRESSION:    Probable urinary bladder cystitis. Correlate with urinalysis.   Indeterminate right renal lesion measures 1.4 cm on image 52 series 301.   Correlate with contrast-enhanced abdominal MRI for characterization.    Air is noted within the endometrial canal. Endovaginal canal is partially   filled with fluid. These findings are of unclear clinical significance.   GYN evaluation is recommended given the history of vaginal bleeding.    Anterior abdominal wall laxity. Small to moderate size fat-containing   hernia with nodular densities within the hernia and at the hernia   entrance site. Additional right anterior subcutaneous nodular density.   Most likely these findings are related to recent injections. Correlate   clinically. Follow-up CT abdomen pelvis is recommended in 3 months to   exclude lesions.    --- End of Report ---    < end of copied text >  
CHIEF COMPLAINT: cp    HISTORY OF PRESENT ILLNESS:  74  year old woman, AAOX3, from home, wheelchair-bound and uses cane occasionally , w/ PMHx COPD on 3 L NC at home, Obesity, Hypothyroidism, Diastolic HF previously on Lasix, Afib on Eliquis, Breast cancer s/p surgery, Asthma, Chronic lymphedema, Kidney cancer s/p partial nephrectomy, anxiety, presenting to the ER  with blood in diaper when changed by HHA. appeared as dark red blood in diaper x 3 today. appears to be coming from vaginal area, not rectum. states increased urination yesterday. also reports chest pain started yesterday. nonradiating ( upon further questioning patient reports pain all over the body).  pelvic exam done by ER doctor limited exaxm, vaginal vault visualized in speculum, ?scant blood > CT scan ordered, pending  Upon further discussed with pt's son who is at bedside.. patient lives at home with , who has been gradually becoming more forgetful and falling a lot himself, and unable to care for himself or for he patient ( as the primary care taker for many years)...   pt's son Alexander offered fot them both to move in with him but they refused and patient and  have also been refusing to go to see their doctor and kept cancelling their appointments..  at this pont patient and son in agreement that patient and  ( also being admitted) would need to be placed together into a long term care center..       Allergies    sulfa drugs (Unknown)  Peaches (Hives)  shellfish (Hives)  Grapes (Hives)    Intolerances    	    MEDICATIONS:  amLODIPine   Tablet 5 milliGRAM(s) Oral daily  apixaban 5 milliGRAM(s) Oral every 12 hours  aspirin enteric coated 81 milliGRAM(s) Oral daily  hydrALAZINE 75 milliGRAM(s) Oral two times a day  metoprolol tartrate 25 milliGRAM(s) Oral two times a day  sacubitril 24 mG/valsartan 26 mG 1 Tablet(s) Oral two times a day    cefTRIAXone   IVPB 1000 milliGRAM(s) IV Intermittent every 24 hours    buDESOnide    Inhalation Suspension 0.5 milliGRAM(s) Inhalation every 12 hours  montelukast 10 milliGRAM(s) Oral daily  tiotropium 2.5 MICROgram(s) Inhaler 2 Puff(s) Inhalation daily    acetaminophen     Tablet .. 650 milliGRAM(s) Oral every 6 hours PRN  acetaminophen   IVPB .. 1000 milliGRAM(s) IV Intermittent once  busPIRone 10 milliGRAM(s) Oral <User Schedule>  DULoxetine 60 milliGRAM(s) Oral daily  gabapentin 100 milliGRAM(s) Oral three times a day  melatonin 3 milliGRAM(s) Oral at bedtime PRN  ondansetron Injectable 4 milliGRAM(s) IV Push every 8 hours PRN  traMADol 50 milliGRAM(s) Oral two times a day PRN    aluminum hydroxide/magnesium hydroxide/simethicone Suspension 30 milliLiter(s) Oral every 4 hours PRN  pantoprazole  Injectable 40 milliGRAM(s) IV Push daily    atorvastatin 40 milliGRAM(s) Oral at bedtime  levothyroxine 125 MICROGram(s) Oral daily    multivitamin 1 Tablet(s) Oral daily  nystatin/triamcinolone Cream 1 Application(s) Topical two times a day  zinc oxide 20% Ointment 1 Application(s) Topical two times a day      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      History of carpal tunnel release  right wrist      History of parathyroidectomy      S/P laminectomy  now bed and wheelchair ridden with severe pain          FAMILY HISTORY:  Family history of myocardial infarction  mother  at age 67 and father at age 67 of MI's    Family history of hypertension  mother and father    Family history of diabetes mellitus (Sibling)  siblings    Family history of heart disease (Sibling)  brother has a PPM    Family history of thyroid cancer (Child)  daughter has thyroid cancer        SOCIAL HISTORY:    non smoker. indep in adl      REVIEW OF SYSTEMS:  See HPI, otherwise complete 10 point review of systems negative    [ ] All others negative	      PHYSICAL EXAM:  T(C): 36.7 (25 @ 05:35), Max: 36.7 (25 @ 19:35)  HR: 64 (25 @ 05:35) (64 - 73)  BP: 129/64 (25 @ 05:35) (129/64 - 161/65)  RR: 18 (25 @ 05:35) (18 - 20)  SpO2: 96% (25 @ 05:35) (93% - 98%)  Wt(kg): --  I&O's Summary      Appearance: No Acute Distress	  HEENT:  Normal oral mucosa, PERRL, EOMI	  Cardiovascular: Normal S1 S2, No JVD, No murmurs/rubs/gallops  Respiratory: Lungs clear to auscultation bilaterally  Gastrointestinal:  Soft, Non-tender, + BS	  Skin: No rashes, No ecchymoses, No cyanosis	  Neurologic: Non-focal  Extremities: No clubbing, cyanosis or edema  Vascular: Peripheral pulses palpable 2+ bilaterally  Psychiatry: A & O x 3, Mood & affect appropriate    Laboratory Data:	 	    CBC Full  -  ( 2025 15:22 )  WBC Count : 8.78 K/uL  Hemoglobin : 12.3 g/dL  Hematocrit : 40.1 %  Platelet Count - Automated : 208 K/uL  Mean Cell Volume : 93.3 fl  Mean Cell Hemoglobin : 28.6 pg  Mean Cell Hemoglobin Concentration : 30.7 g/dL  Auto Neutrophil # : 6.03 K/uL  Auto Lymphocyte # : 1.75 K/uL  Auto Monocyte # : 0.79 K/uL  Auto Eosinophil # : 0.15 K/uL  Auto Basophil # : 0.03 K/uL  Auto Neutrophil % : 68.8 %  Auto Lymphocyte % : 19.9 %  Auto Monocyte % : 9.0 %  Auto Eosinophil % : 1.7 %  Auto Basophil % : 0.3 %        141  |  103  |  16  ----------------------------<  91  4.3   |  27  |  0.71    Ca    10.4      2025 15:22    TPro  7.3  /  Alb  4.0  /  TBili  0.4  /  DBili  x   /  AST  13  /  ALT  14  /  AlkPhos  111        proBNP:   Lipid Profile:   HgA1c:   TSH:       CARDIAC MARKERS:            Interpretation of Telemetry: 	    ECG:  	  RADIOLOGY:  OTHER: 	    PREVIOUS DIAGNOSTIC TESTING:    [ ] Echocardiogram:  [ ] Catheterization:  [ ] Stress Test:  	    Assessment:  74  year old woman, AAOX3, from home, wheelchair-bound and uses cane occasionally , w/ PMHx COPD on 3 L NC at home, Obesity, Hypothyroidism, Diastolic HF previously on Lasix, Afib on Eliquis, Breast cancer s/p surgery, Asthma, Chronic lymphedema, Kidney cancer s/p partial nephrectomy, anxiety, presenting to the ER  with blood in diaper when changed by HHA. appeared as dark red blood in diaper x 3 today. appears to be coming from vaginal area, not rectum. states increased urination yesterday. also reports chest pain started yesterday. nonradiating     Recs:  cardiac stable  no e/o acs  atypical cp. can defer ischemic eval at this time  cw gdmt for lv dysfunction  cw ac and rate control meds for paf  diuretics prn  gyn consult for vaginal bleeding while on ac  will follow          Advanced care planning forms were discussed. Code status including forceful chest compressions, defibrillation and intubation were discussed. The risks benefits and alternatives to pertinent cardiac procedures and interventions were discussed in detail and all questions were answered. Duration: 15 minutes.  Greater than 90 minutes spent on total encounter; more than 50% of the visit was spent counseling and/or coordinating care by the attending physician.   	  Juan Salcedo MD   Cardiovascular Diseases  [phone]

## 2025-02-04 NOTE — CONSULT NOTE ADULT - ASSESSMENT
74 yof P3 with significant comorbidities including COPD, Afib on Eliquis, and functional hemiparesis, who presented to the ED with new onset spotting, possibly uterine in origin. CT abd/pelvis shows air in the endometrial canal. Exam difficult to perform with patient's limited mobility and limited tolerance of exam, thus unclear whether bleeding is certainly from uterus or not.       - f/u TVUS  - will provide further recommendations with imaging results      Derrick Martinez, PGY3   [id number] 74 yof P3 with significant comorbidities including COPD, Afib on Eliquis, and functional hemiparesis, who presented to the ED with new onset spotting, possibly uterine in origin. CT abd/pelvis shows air in the endometrial canal. TVUS showing air in endometrial canal, endocervical as well as vaginal canal, c/f possible fistula. Exam difficult to perform with patient's limited mobility and limited tolerance of exam, thus unclear whether bleeding is certainly from uterus or not.       - recommend MR pelvis with IV cont with fistula protocol  - would recommend EMBx, given patient unable to tolerate exam would recommend under anesthesia  - patient needs medical clearance prior to OR      D/w Dr. Anne Martinez, PGY3   [id number]

## 2025-02-05 RX ADMIN — Medication 1 APPLICATION(S): at 17:28

## 2025-02-05 RX ADMIN — APIXABAN 5 MILLIGRAM(S): 5 TABLET, FILM COATED ORAL at 05:06

## 2025-02-05 RX ADMIN — DULOXETINE 60 MILLIGRAM(S): 20 CAPSULE, DELAYED RELEASE ORAL at 13:07

## 2025-02-05 RX ADMIN — Medication 75 MILLIGRAM(S): at 05:06

## 2025-02-05 RX ADMIN — MONTELUKAST SODIUM 10 MILLIGRAM(S): 5 TABLET, CHEWABLE ORAL at 13:08

## 2025-02-05 RX ADMIN — CEFTRIAXONE 100 MILLIGRAM(S): 250 INJECTION, POWDER, FOR SOLUTION INTRAMUSCULAR; INTRAVENOUS at 08:41

## 2025-02-05 RX ADMIN — BUDESONIDE 0.5 MILLIGRAM(S): 9 TABLET, EXTENDED RELEASE ORAL at 05:06

## 2025-02-05 RX ADMIN — SACUBITRIL AND VALSARTAN 1 TABLET(S): 49; 51 TABLET, FILM COATED ORAL at 17:27

## 2025-02-05 RX ADMIN — BUSPIRONE HYDROCHLORIDE 10 MILLIGRAM(S): 5 TABLET ORAL at 13:08

## 2025-02-05 RX ADMIN — TRAMADOL HYDROCHLORIDE 50 MILLIGRAM(S): 100 TABLET, EXTENDED RELEASE ORAL at 17:31

## 2025-02-05 RX ADMIN — LEVOTHYROXINE SODIUM 125 MICROGRAM(S): 25 TABLET ORAL at 05:06

## 2025-02-05 RX ADMIN — Medication 5 MILLIGRAM(S): at 05:07

## 2025-02-05 RX ADMIN — Medication 75 MILLIGRAM(S): at 17:27

## 2025-02-05 RX ADMIN — ATORVASTATIN CALCIUM 40 MILLIGRAM(S): 80 TABLET, FILM COATED ORAL at 21:07

## 2025-02-05 RX ADMIN — GABAPENTIN 100 MILLIGRAM(S): 800 TABLET ORAL at 21:07

## 2025-02-05 RX ADMIN — ACETAMINOPHEN, DIPHENHYDRAMINE HCL, PHENYLEPHRINE HCL 3 MILLIGRAM(S): 325; 25; 5 TABLET ORAL at 21:07

## 2025-02-05 RX ADMIN — Medication 1 TABLET(S): at 13:07

## 2025-02-05 RX ADMIN — ACETAMINOPHEN 650 MILLIGRAM(S): 160 SUSPENSION ORAL at 11:51

## 2025-02-05 RX ADMIN — TRAMADOL HYDROCHLORIDE 50 MILLIGRAM(S): 100 TABLET, EXTENDED RELEASE ORAL at 21:07

## 2025-02-05 RX ADMIN — TIOTROPIUM BROMIDE MONOHYDRATE 2 PUFF(S): 18 CAPSULE ORAL; RESPIRATORY (INHALATION) at 13:06

## 2025-02-05 RX ADMIN — Medication 25 MILLIGRAM(S): at 05:06

## 2025-02-05 RX ADMIN — GABAPENTIN 100 MILLIGRAM(S): 800 TABLET ORAL at 05:06

## 2025-02-05 RX ADMIN — PANTOPRAZOLE 40 MILLIGRAM(S): 20 TABLET, DELAYED RELEASE ORAL at 13:07

## 2025-02-05 RX ADMIN — Medication 1 APPLICATION(S): at 05:07

## 2025-02-05 RX ADMIN — GABAPENTIN 100 MILLIGRAM(S): 800 TABLET ORAL at 13:06

## 2025-02-05 RX ADMIN — SACUBITRIL AND VALSARTAN 1 TABLET(S): 49; 51 TABLET, FILM COATED ORAL at 05:07

## 2025-02-05 RX ADMIN — BUDESONIDE 0.5 MILLIGRAM(S): 9 TABLET, EXTENDED RELEASE ORAL at 17:28

## 2025-02-05 RX ADMIN — TRAMADOL HYDROCHLORIDE 50 MILLIGRAM(S): 100 TABLET, EXTENDED RELEASE ORAL at 00:26

## 2025-02-05 RX ADMIN — ACETAMINOPHEN 650 MILLIGRAM(S): 160 SUSPENSION ORAL at 08:18

## 2025-02-05 RX ADMIN — Medication 25 MILLIGRAM(S): at 17:28

## 2025-02-05 NOTE — DIETITIAN INITIAL EVALUATION ADULT - ORAL INTAKE PTA/DIET HISTORY
Pt was eating well with no changes in appetite. Food allergies- grapes and peaches however pt states she can tolerate small amount. Denies chewing or swallowing issues. Reports periods of constipation (bed bound, pain medications). Denies micronutrient supplement use, denies protein supplement use.

## 2025-02-05 NOTE — DIETITIAN INITIAL EVALUATION ADULT - PERTINENT MEDS FT
MEDICATIONS  (STANDING):  acetaminophen   IVPB .. 1000 milliGRAM(s) IV Intermittent once  amLODIPine   Tablet 5 milliGRAM(s) Oral daily  atorvastatin 40 milliGRAM(s) Oral at bedtime  buDESOnide    Inhalation Suspension 0.5 milliGRAM(s) Inhalation every 12 hours  busPIRone 10 milliGRAM(s) Oral <User Schedule>  cefTRIAXone   IVPB 1000 milliGRAM(s) IV Intermittent every 24 hours  DULoxetine 60 milliGRAM(s) Oral daily  gabapentin 100 milliGRAM(s) Oral three times a day  hydrALAZINE 75 milliGRAM(s) Oral two times a day  levothyroxine 125 MICROGram(s) Oral daily  metoprolol tartrate 25 milliGRAM(s) Oral two times a day  montelukast 10 milliGRAM(s) Oral daily  multivitamin 1 Tablet(s) Oral daily  nystatin/triamcinolone Cream 1 Application(s) Topical two times a day  pantoprazole  Injectable 40 milliGRAM(s) IV Push daily  sacubitril 24 mG/valsartan 26 mG 1 Tablet(s) Oral two times a day  tiotropium 2.5 MICROgram(s) Inhaler 2 Puff(s) Inhalation daily  zinc oxide 20% Ointment 1 Application(s) Topical two times a day    MEDICATIONS  (PRN):  acetaminophen     Tablet .. 650 milliGRAM(s) Oral every 6 hours PRN Temp greater or equal to 38C (100.4F), Mild Pain (1 - 3)  aluminum hydroxide/magnesium hydroxide/simethicone Suspension 30 milliLiter(s) Oral every 4 hours PRN Dyspepsia  melatonin 3 milliGRAM(s) Oral at bedtime PRN Insomnia  ondansetron Injectable 4 milliGRAM(s) IV Push every 8 hours PRN Nausea and/or Vomiting  traMADol 50 milliGRAM(s) Oral two times a day PRN Moderate Pain (4 - 6)

## 2025-02-05 NOTE — DIETITIAN INITIAL EVALUATION ADULT - NSFNSPHYEXAMSKINFT_GEN_A_CORE
Pressure Injury 1: Bilateral:, buttocks, Suspected deep tissue injury  Pressure Injury 6: Left:, heel, Stage I  Pressure Injury 7: Right:, heel, Stage I    skin per wound care note 2/4:   "B/L heel hyperpigmentation, cannot rule out a deep tissue injury present on admission  B/L buttocks/sacral deep tissue injury present on admission  B/L ischium deep tissue injury present on admission  right lateral calf wound present on admission "

## 2025-02-05 NOTE — DIETITIAN INITIAL EVALUATION ADULT - ORAL NUTRITION SUPPLEMENTS
Recommend Liquid Protein Supplement 1x daily (100 kcal, 15 gm protein) and Tin 1x daily (90 kcal, 3g proteins) to provide additional calories and nutrients to encourage PO intake while in house/aid wound healing.

## 2025-02-05 NOTE — DIETITIAN INITIAL EVALUATION ADULT - PERTINENT LABORATORY DATA
02-04    141  |  104  |  16  ----------------------------<  69[L]  3.9   |  24  |  0.70    Ca    10.0      04 Feb 2025 07:32    TPro  6.4  /  Alb  3.4  /  TBili  0.6  /  DBili  x   /  AST  14  /  ALT  10  /  AlkPhos  98  02-04  A1C with Estimated Average Glucose Result: 5.4 % (02-04-25 @ 07:32)  A1C with Estimated Average Glucose Result: 6.4 % (02-14-24 @ 01:48)

## 2025-02-05 NOTE — DIETITIAN INITIAL EVALUATION ADULT - ENERGY INTAKE
Pt reports fair/good appetite with ~50 to >75% of foods provided since admission. Pt made aware of menu ordering procedures in house with menu provided, encourage pt to order as needed to encourage PO intake while in house.

## 2025-02-05 NOTE — DIETITIAN INITIAL EVALUATION ADULT - NSFNSGIASSESSMENTFT_GEN_A_CORE
Denies nausea/vomiting and diarrhea at present, pt reports periods of constipation, not on any bowel regimen, willing to try prune.

## 2025-02-05 NOTE — PHYSICAL THERAPY INITIAL EVALUATION ADULT - TRANSFER TRAINING, PT EVAL
GOAL: Patient will perform sit to stand transfers supervision with least restrictive assistive device and proper hand placement in 4 weeks.

## 2025-02-05 NOTE — DIETITIAN INITIAL EVALUATION ADULT - ADD RECOMMEND
-- Continue MVI as ordered with addition of Vitamin C, pending no medical contraindications, for micronutrient support/aid wound healing.   -- Monitor PO intake, GI tolerance, skin integrity, labs, weight, and bowel movement regularity.   -- Encourage use of daily menus. Honor dietary preferences as expressed as able.

## 2025-02-05 NOTE — DIETITIAN INITIAL EVALUATION ADULT - EDUCATION DIETARY MODIFICATIONS
briefly discussed about DASH diet- Reviewed foods high in Na and cholesterol to avoid. Encouraged PO intake with emphasis on protein foods as tolerated. Educated pt on foods rich in protein and encouraged consumption as able. Pt amenable to recommendations. All nutrition-related questions answered. Pt made aware RD remains available./(1) partially meets; needs review/practice/verbalization

## 2025-02-05 NOTE — PHYSICAL THERAPY INITIAL EVALUATION ADULT - NSPTDISCHREC_GEN_A_CORE
(if home, recommend assist with ALL mobility/ADLs, Home PT, mechanical lift, hospital bed)/Sub-acute Rehab

## 2025-02-05 NOTE — DIETITIAN INITIAL EVALUATION ADULT - NS FNS DIET ORDER
Diet, Regular:   DASH/TLC {Sodium & Cholesterol Restricted} (DASH) (02-03-25 @ 20:48) [Active]

## 2025-02-05 NOTE — DIETITIAN INITIAL EVALUATION ADULT - OTHER CALCULATIONS
Fluid needs deferred to team. Energy and protein needs based on adjusted IBW of 121lb in consideration of BMI >30 and Increased nutrient needs.

## 2025-02-05 NOTE — DIETITIAN INITIAL EVALUATION ADULT - OTHER INFO
- Micronutrient/Other supplementation: MVI  - Most recent HbA1c 5.4 on 2/4, no DM history per chart  - COPD on O2 via nasal cannula upon visit   - Heart Failure history per chart

## 2025-02-05 NOTE — DIETITIAN INITIAL EVALUATION ADULT - PHYSCIAL ASSESSMENT
Drug Dosing Weight  Height (cm): 157.5 (03 Feb 2025 14:06)  Weight (kg): 90.7 (03 Feb 2025 14:06)  BMI (kg/m2): 36.6 (03 Feb 2025 14:06)    Daily weight (standing or bed scale): none documented thus far   Weight obtained by RD: 102kg/224lb (2/5 bed scale) ? accuracy     Weight history:   Per pt: ~200lb/90.7kg, denies recent weight changes  Previous RD notes: 229.3lb (02/22/24), 231lb (2/1/24), 200lb (7/25/23), 256lb (7/15/23)   ** Noted weight fluctuation from weight history might due to bed scale weight accuracy (pt bed bound) and fluid shift with Heart Failure. RD will continue to monitor wt trends as available/able.     IBW: 121lb (adjusted for BMI >30), 165% IBW

## 2025-02-05 NOTE — PHYSICAL THERAPY INITIAL EVALUATION ADULT - PERTINENT HX OF CURRENT PROBLEM, REHAB EVAL
74  year old woman, AAOX3, from home, wheelchair-bound and uses cane occasionally , w/ PMHx COPD on 3 L NC at home, Obesity, Hypothyroidism, Diastolic HF previously on Lasix, Afib on Eliquis, Breast cancer s/p surgery, Asthma, Chronic lymphedema, Kidney cancer s/p partial nephrectomy, anxiety, presenting to the ER  with blood in diaper when changed by HHA. appeared as dark red blood in diaper x 3 today. appears to be coming from vaginal area, not rectum. states increased urination yesterday. also reports chest pain started yesterday. nonradiating ( upon further questioning patient reports pain all over the body).  pelvic exam done by ER doctor limited exaxm, vaginal vault visualized in speculum, ?scant blood > CT scan ordered.

## 2025-02-05 NOTE — DIETITIAN INITIAL EVALUATION ADULT - REASON INDICATOR FOR ASSESSMENT
Pt seen for consult for pressure injury stage 2/>. Information obtained from pt, RN, electronic medical record. Chart reviewed, events noted.

## 2025-02-06 LAB
-  AMOXICILLIN/CLAVULANIC ACID: SIGNIFICANT CHANGE UP
-  AMPICILLIN/SULBACTAM: SIGNIFICANT CHANGE UP
-  AMPICILLIN: SIGNIFICANT CHANGE UP
-  AZTREONAM: SIGNIFICANT CHANGE UP
-  CEFAZOLIN: SIGNIFICANT CHANGE UP
-  CEFEPIME: SIGNIFICANT CHANGE UP
-  CEFOXITIN: SIGNIFICANT CHANGE UP
-  CEFTRIAXONE: SIGNIFICANT CHANGE UP
-  CEFUROXIME: SIGNIFICANT CHANGE UP
-  CIPROFLOXACIN: SIGNIFICANT CHANGE UP
-  ERTAPENEM: SIGNIFICANT CHANGE UP
-  GENTAMICIN: SIGNIFICANT CHANGE UP
-  IMIPENEM: SIGNIFICANT CHANGE UP
-  LEVOFLOXACIN: SIGNIFICANT CHANGE UP
-  MEROPENEM: SIGNIFICANT CHANGE UP
-  NITROFURANTOIN: SIGNIFICANT CHANGE UP
-  PIPERACILLIN/TAZOBACTAM: SIGNIFICANT CHANGE UP
-  TOBRAMYCIN: SIGNIFICANT CHANGE UP
-  TRIMETHOPRIM/SULFAMETHOXAZOLE: SIGNIFICANT CHANGE UP
METHOD TYPE: SIGNIFICANT CHANGE UP

## 2025-02-06 RX ORDER — CEFTRIAXONE 250 MG/1
1000 INJECTION, POWDER, FOR SOLUTION INTRAMUSCULAR; INTRAVENOUS EVERY 24 HOURS
Refills: 0 | Status: DISCONTINUED | OUTPATIENT
Start: 2025-02-06 | End: 2025-02-06

## 2025-02-06 RX ADMIN — CEFTRIAXONE 100 MILLIGRAM(S): 250 INJECTION, POWDER, FOR SOLUTION INTRAMUSCULAR; INTRAVENOUS at 08:18

## 2025-02-06 RX ADMIN — TRAMADOL HYDROCHLORIDE 50 MILLIGRAM(S): 100 TABLET, EXTENDED RELEASE ORAL at 08:53

## 2025-02-06 RX ADMIN — Medication 5 MILLIGRAM(S): at 05:42

## 2025-02-06 RX ADMIN — ACETAMINOPHEN, DIPHENHYDRAMINE HCL, PHENYLEPHRINE HCL 3 MILLIGRAM(S): 325; 25; 5 TABLET ORAL at 22:13

## 2025-02-06 RX ADMIN — Medication 1 APPLICATION(S): at 05:42

## 2025-02-06 RX ADMIN — TRAMADOL HYDROCHLORIDE 50 MILLIGRAM(S): 100 TABLET, EXTENDED RELEASE ORAL at 09:30

## 2025-02-06 RX ADMIN — Medication 75 MILLIGRAM(S): at 17:20

## 2025-02-06 RX ADMIN — Medication 25 MILLIGRAM(S): at 17:17

## 2025-02-06 RX ADMIN — GABAPENTIN 100 MILLIGRAM(S): 800 TABLET ORAL at 05:41

## 2025-02-06 RX ADMIN — SACUBITRIL AND VALSARTAN 1 TABLET(S): 49; 51 TABLET, FILM COATED ORAL at 05:41

## 2025-02-06 RX ADMIN — MONTELUKAST SODIUM 10 MILLIGRAM(S): 5 TABLET, CHEWABLE ORAL at 11:47

## 2025-02-06 RX ADMIN — Medication 1 APPLICATION(S): at 20:06

## 2025-02-06 RX ADMIN — GABAPENTIN 100 MILLIGRAM(S): 800 TABLET ORAL at 22:12

## 2025-02-06 RX ADMIN — Medication 1 APPLICATION(S): at 17:17

## 2025-02-06 RX ADMIN — BUDESONIDE 0.5 MILLIGRAM(S): 9 TABLET, EXTENDED RELEASE ORAL at 17:18

## 2025-02-06 RX ADMIN — Medication 75 MILLIGRAM(S): at 05:41

## 2025-02-06 RX ADMIN — TRAMADOL HYDROCHLORIDE 50 MILLIGRAM(S): 100 TABLET, EXTENDED RELEASE ORAL at 21:00

## 2025-02-06 RX ADMIN — DULOXETINE 60 MILLIGRAM(S): 20 CAPSULE, DELAYED RELEASE ORAL at 11:48

## 2025-02-06 RX ADMIN — ATORVASTATIN CALCIUM 40 MILLIGRAM(S): 80 TABLET, FILM COATED ORAL at 22:12

## 2025-02-06 RX ADMIN — LEVOTHYROXINE SODIUM 125 MICROGRAM(S): 25 TABLET ORAL at 05:41

## 2025-02-06 RX ADMIN — GABAPENTIN 100 MILLIGRAM(S): 800 TABLET ORAL at 13:10

## 2025-02-06 RX ADMIN — TRAMADOL HYDROCHLORIDE 50 MILLIGRAM(S): 100 TABLET, EXTENDED RELEASE ORAL at 20:32

## 2025-02-06 RX ADMIN — TIOTROPIUM BROMIDE MONOHYDRATE 2 PUFF(S): 18 CAPSULE ORAL; RESPIRATORY (INHALATION) at 11:46

## 2025-02-06 RX ADMIN — SACUBITRIL AND VALSARTAN 1 TABLET(S): 49; 51 TABLET, FILM COATED ORAL at 17:18

## 2025-02-06 RX ADMIN — Medication 1 TABLET(S): at 11:48

## 2025-02-06 RX ADMIN — PANTOPRAZOLE 40 MILLIGRAM(S): 20 TABLET, DELAYED RELEASE ORAL at 11:47

## 2025-02-06 RX ADMIN — Medication 25 MILLIGRAM(S): at 05:42

## 2025-02-06 RX ADMIN — BUDESONIDE 0.5 MILLIGRAM(S): 9 TABLET, EXTENDED RELEASE ORAL at 05:42

## 2025-02-06 RX ADMIN — BUSPIRONE HYDROCHLORIDE 10 MILLIGRAM(S): 5 TABLET ORAL at 11:48

## 2025-02-06 NOTE — CHART NOTE - NSCHARTNOTEFT_GEN_A_CORE
Patient added on to OR tomorrow with Dr. Rodríguez for EUA, endometrial biopsy at 9:30AM  - Please make patient NPO after midnight for procedure  - Please ensure medical clearance is documented clearly in patient chart    ERICKA Cast PGY-2 Patient added on to OR tomorrow with Dr. Rodríguez for EUA, endometrial biopsy at 11:00AM  - Please make patient NPO after midnight for procedure  - Please ensure medical clearance is documented clearly in patient chart    ERICKA Cast PGY-2

## 2025-02-07 LAB
-  AMOXICILLIN/CLAVULANIC ACID: SIGNIFICANT CHANGE UP
-  AMPICILLIN/SULBACTAM: SIGNIFICANT CHANGE UP
-  AMPICILLIN: SIGNIFICANT CHANGE UP
-  AZTREONAM: SIGNIFICANT CHANGE UP
-  CEFAZOLIN: SIGNIFICANT CHANGE UP
-  CEFEPIME: SIGNIFICANT CHANGE UP
-  CEFOXITIN: SIGNIFICANT CHANGE UP
-  CEFTRIAXONE: SIGNIFICANT CHANGE UP
-  CEFUROXIME: SIGNIFICANT CHANGE UP
-  CIPROFLOXACIN: SIGNIFICANT CHANGE UP
-  ERTAPENEM: SIGNIFICANT CHANGE UP
-  GENTAMICIN: SIGNIFICANT CHANGE UP
-  LEVOFLOXACIN: SIGNIFICANT CHANGE UP
-  MEROPENEM: SIGNIFICANT CHANGE UP
-  NITROFURANTOIN: SIGNIFICANT CHANGE UP
-  PIPERACILLIN/TAZOBACTAM: SIGNIFICANT CHANGE UP
-  TOBRAMYCIN: SIGNIFICANT CHANGE UP
-  TRIMETHOPRIM/SULFAMETHOXAZOLE: SIGNIFICANT CHANGE UP
ANION GAP SERPL CALC-SCNC: 10 MMOL/L — SIGNIFICANT CHANGE UP (ref 5–17)
BLD GP AB SCN SERPL QL: NEGATIVE — SIGNIFICANT CHANGE UP
BUN SERPL-MCNC: 18 MG/DL — SIGNIFICANT CHANGE UP (ref 7–23)
CALCIUM SERPL-MCNC: 9.8 MG/DL — SIGNIFICANT CHANGE UP (ref 8.4–10.5)
CHLORIDE SERPL-SCNC: 104 MMOL/L — SIGNIFICANT CHANGE UP (ref 96–108)
CO2 SERPL-SCNC: 26 MMOL/L — SIGNIFICANT CHANGE UP (ref 22–31)
CREAT SERPL-MCNC: 0.6 MG/DL — SIGNIFICANT CHANGE UP (ref 0.5–1.3)
CULTURE RESULTS: ABNORMAL
EGFR: 93 ML/MIN/1.73M2 — SIGNIFICANT CHANGE UP
GLUCOSE SERPL-MCNC: 89 MG/DL — SIGNIFICANT CHANGE UP (ref 70–99)
HCT VFR BLD CALC: 36.6 % — SIGNIFICANT CHANGE UP (ref 34.5–45)
HGB BLD-MCNC: 11.4 G/DL — LOW (ref 11.5–15.5)
INR BLD: 1.12 RATIO — SIGNIFICANT CHANGE UP (ref 0.85–1.16)
MCHC RBC-ENTMCNC: 29.5 PG — SIGNIFICANT CHANGE UP (ref 27–34)
MCHC RBC-ENTMCNC: 31.1 G/DL — LOW (ref 32–36)
MCV RBC AUTO: 94.6 FL — SIGNIFICANT CHANGE UP (ref 80–100)
METHOD TYPE: SIGNIFICANT CHANGE UP
NRBC # BLD: 0 /100 WBCS — SIGNIFICANT CHANGE UP (ref 0–0)
NRBC BLD-RTO: 0 /100 WBCS — SIGNIFICANT CHANGE UP (ref 0–0)
ORGANISM # SPEC MICROSCOPIC CNT: ABNORMAL
PLATELET # BLD AUTO: 171 K/UL — SIGNIFICANT CHANGE UP (ref 150–400)
POTASSIUM SERPL-MCNC: 3.9 MMOL/L — SIGNIFICANT CHANGE UP (ref 3.5–5.3)
POTASSIUM SERPL-SCNC: 3.9 MMOL/L — SIGNIFICANT CHANGE UP (ref 3.5–5.3)
PROTHROM AB SERPL-ACNC: 12.7 SEC — SIGNIFICANT CHANGE UP (ref 9.9–13.4)
RBC # BLD: 3.87 M/UL — SIGNIFICANT CHANGE UP (ref 3.8–5.2)
RBC # FLD: 14 % — SIGNIFICANT CHANGE UP (ref 10.3–14.5)
RH IG SCN BLD-IMP: NEGATIVE — SIGNIFICANT CHANGE UP
SODIUM SERPL-SCNC: 140 MMOL/L — SIGNIFICANT CHANGE UP (ref 135–145)
SPECIMEN SOURCE: SIGNIFICANT CHANGE UP
WBC # BLD: 7.91 K/UL — SIGNIFICANT CHANGE UP (ref 3.8–10.5)
WBC # FLD AUTO: 7.91 K/UL — SIGNIFICANT CHANGE UP (ref 3.8–10.5)

## 2025-02-07 PROCEDURE — 58120 DILATION AND CURETTAGE: CPT | Mod: 53

## 2025-02-07 RX ORDER — SACUBITRIL AND VALSARTAN 49; 51 MG/1; MG/1
1 TABLET, FILM COATED ORAL
Refills: 0 | Status: DISCONTINUED | OUTPATIENT
Start: 2025-02-07 | End: 2025-02-13

## 2025-02-07 RX ORDER — BUSPIRONE HYDROCHLORIDE 5 MG/1
10 TABLET ORAL
Refills: 0 | Status: DISCONTINUED | OUTPATIENT
Start: 2025-02-07 | End: 2025-02-13

## 2025-02-07 RX ORDER — ACETAMINOPHEN 160 MG/5ML
650 SUSPENSION ORAL EVERY 6 HOURS
Refills: 0 | Status: DISCONTINUED | OUTPATIENT
Start: 2025-02-07 | End: 2025-02-13

## 2025-02-07 RX ORDER — GABAPENTIN 800 MG/1
100 TABLET ORAL THREE TIMES A DAY
Refills: 0 | Status: DISCONTINUED | OUTPATIENT
Start: 2025-02-07 | End: 2025-02-13

## 2025-02-07 RX ORDER — HYDROMORPHONE HYDROCHLORIDE 4 MG/ML
0.25 INJECTION, SOLUTION INTRAMUSCULAR; INTRAVENOUS; SUBCUTANEOUS
Refills: 0 | Status: DISCONTINUED | OUTPATIENT
Start: 2025-02-07 | End: 2025-02-07

## 2025-02-07 RX ORDER — MECOBAL/LEVOMEFOLAT CA/B6 PHOS 2-3-35 MG
1 TABLET ORAL DAILY
Refills: 0 | Status: DISCONTINUED | OUTPATIENT
Start: 2025-02-07 | End: 2025-02-13

## 2025-02-07 RX ORDER — METOPROLOL SUCCINATE 25 MG
25 TABLET, EXTENDED RELEASE 24 HR ORAL
Refills: 0 | Status: DISCONTINUED | OUTPATIENT
Start: 2025-02-07 | End: 2025-02-13

## 2025-02-07 RX ORDER — LEVOTHYROXINE SODIUM 25 UG/1
125 TABLET ORAL DAILY
Refills: 0 | Status: DISCONTINUED | OUTPATIENT
Start: 2025-02-07 | End: 2025-02-13

## 2025-02-07 RX ORDER — TIOTROPIUM BROMIDE MONOHYDRATE 18 UG/1
2 CAPSULE ORAL; RESPIRATORY (INHALATION) DAILY
Refills: 0 | Status: DISCONTINUED | OUTPATIENT
Start: 2025-02-07 | End: 2025-02-13

## 2025-02-07 RX ORDER — ACETAMINOPHEN, DIPHENHYDRAMINE HCL, PHENYLEPHRINE HCL 325; 25; 5 MG/1; MG/1; MG/1
3 TABLET ORAL AT BEDTIME
Refills: 0 | Status: DISCONTINUED | OUTPATIENT
Start: 2025-02-07 | End: 2025-02-13

## 2025-02-07 RX ORDER — BUDESONIDE 9 MG/1
0.5 TABLET, EXTENDED RELEASE ORAL EVERY 12 HOURS
Refills: 0 | Status: DISCONTINUED | OUTPATIENT
Start: 2025-02-07 | End: 2025-02-13

## 2025-02-07 RX ORDER — AMLODIPINE BESYLATE 5 MG
5 TABLET ORAL DAILY
Refills: 0 | Status: DISCONTINUED | OUTPATIENT
Start: 2025-02-07 | End: 2025-02-13

## 2025-02-07 RX ORDER — ONDANSETRON 4 MG/1
4 TABLET, ORALLY DISINTEGRATING ORAL ONCE
Refills: 0 | Status: DISCONTINUED | OUTPATIENT
Start: 2025-02-07 | End: 2025-02-07

## 2025-02-07 RX ORDER — TRAMADOL HYDROCHLORIDE 100 MG/1
50 TABLET, EXTENDED RELEASE ORAL
Refills: 0 | Status: DISCONTINUED | OUTPATIENT
Start: 2025-02-07 | End: 2025-02-13

## 2025-02-07 RX ORDER — PANTOPRAZOLE 20 MG/1
40 TABLET, DELAYED RELEASE ORAL DAILY
Refills: 0 | Status: DISCONTINUED | OUTPATIENT
Start: 2025-02-07 | End: 2025-02-13

## 2025-02-07 RX ORDER — DULOXETINE 20 MG/1
60 CAPSULE, DELAYED RELEASE ORAL DAILY
Refills: 0 | Status: DISCONTINUED | OUTPATIENT
Start: 2025-02-07 | End: 2025-02-13

## 2025-02-07 RX ORDER — HYDRALAZINE HCL 100 MG
75 TABLET ORAL
Refills: 0 | Status: DISCONTINUED | OUTPATIENT
Start: 2025-02-07 | End: 2025-02-13

## 2025-02-07 RX ORDER — MONTELUKAST SODIUM 5 MG/1
10 TABLET, CHEWABLE ORAL DAILY
Refills: 0 | Status: DISCONTINUED | OUTPATIENT
Start: 2025-02-07 | End: 2025-02-13

## 2025-02-07 RX ORDER — ONDANSETRON 4 MG/1
4 TABLET, ORALLY DISINTEGRATING ORAL EVERY 8 HOURS
Refills: 0 | Status: DISCONTINUED | OUTPATIENT
Start: 2025-02-07 | End: 2025-02-13

## 2025-02-07 RX ORDER — ATORVASTATIN CALCIUM 80 MG/1
40 TABLET, FILM COATED ORAL AT BEDTIME
Refills: 0 | Status: DISCONTINUED | OUTPATIENT
Start: 2025-02-07 | End: 2025-02-13

## 2025-02-07 RX ORDER — MAGNESIUM, ALUMINUM HYDROXIDE 200-225/5
30 SUSPENSION, ORAL (FINAL DOSE FORM) ORAL EVERY 4 HOURS
Refills: 0 | Status: DISCONTINUED | OUTPATIENT
Start: 2025-02-07 | End: 2025-02-13

## 2025-02-07 RX ADMIN — Medication 1 APPLICATION(S): at 20:58

## 2025-02-07 RX ADMIN — Medication 75 MILLIGRAM(S): at 05:23

## 2025-02-07 RX ADMIN — Medication 5 MILLIGRAM(S): at 05:24

## 2025-02-07 RX ADMIN — TRAMADOL HYDROCHLORIDE 50 MILLIGRAM(S): 100 TABLET, EXTENDED RELEASE ORAL at 07:43

## 2025-02-07 RX ADMIN — ACETAMINOPHEN, DIPHENHYDRAMINE HCL, PHENYLEPHRINE HCL 3 MILLIGRAM(S): 325; 25; 5 TABLET ORAL at 22:48

## 2025-02-07 RX ADMIN — SACUBITRIL AND VALSARTAN 1 TABLET(S): 49; 51 TABLET, FILM COATED ORAL at 05:23

## 2025-02-07 RX ADMIN — SACUBITRIL AND VALSARTAN 1 TABLET(S): 49; 51 TABLET, FILM COATED ORAL at 20:53

## 2025-02-07 RX ADMIN — BUDESONIDE 0.5 MILLIGRAM(S): 9 TABLET, EXTENDED RELEASE ORAL at 20:57

## 2025-02-07 RX ADMIN — TRAMADOL HYDROCHLORIDE 50 MILLIGRAM(S): 100 TABLET, EXTENDED RELEASE ORAL at 21:51

## 2025-02-07 RX ADMIN — Medication 25 MILLIGRAM(S): at 05:24

## 2025-02-07 RX ADMIN — Medication 25 MILLIGRAM(S): at 20:52

## 2025-02-07 RX ADMIN — LEVOTHYROXINE SODIUM 125 MICROGRAM(S): 25 TABLET ORAL at 05:23

## 2025-02-07 RX ADMIN — GABAPENTIN 100 MILLIGRAM(S): 800 TABLET ORAL at 05:24

## 2025-02-07 RX ADMIN — GABAPENTIN 100 MILLIGRAM(S): 800 TABLET ORAL at 21:06

## 2025-02-07 RX ADMIN — Medication 1 APPLICATION(S): at 05:24

## 2025-02-07 RX ADMIN — ATORVASTATIN CALCIUM 40 MILLIGRAM(S): 80 TABLET, FILM COATED ORAL at 21:06

## 2025-02-07 RX ADMIN — TRAMADOL HYDROCHLORIDE 50 MILLIGRAM(S): 100 TABLET, EXTENDED RELEASE ORAL at 08:15

## 2025-02-07 RX ADMIN — PANTOPRAZOLE 40 MILLIGRAM(S): 20 TABLET, DELAYED RELEASE ORAL at 11:45

## 2025-02-07 RX ADMIN — Medication 75 MILLIGRAM(S): at 21:06

## 2025-02-07 RX ADMIN — TRAMADOL HYDROCHLORIDE 50 MILLIGRAM(S): 100 TABLET, EXTENDED RELEASE ORAL at 20:51

## 2025-02-07 RX ADMIN — TIOTROPIUM BROMIDE MONOHYDRATE 2 PUFF(S): 18 CAPSULE ORAL; RESPIRATORY (INHALATION) at 11:44

## 2025-02-07 RX ADMIN — BUDESONIDE 0.5 MILLIGRAM(S): 9 TABLET, EXTENDED RELEASE ORAL at 05:25

## 2025-02-07 NOTE — PRE-OP CHECKLIST - INTERNAL PROSTHESES
metal on right ankle and spine but patient not very sure about it/yes(specify)
Right ankle & spine hardware/yes(specify)

## 2025-02-07 NOTE — PRE-ANESTHESIA EVALUATION ADULT - NSANTHPMHFT_GEN_ALL_CORE
PMHx asthma, copd, on 3L NC, hypothyroid, graves, renal cancer, HTN, HLD, recent blood from vagina for endometrial biopsies. denies recent uri symptoms or changes in asthma/copd

## 2025-02-07 NOTE — CHART NOTE - NSCHARTNOTEFT_GEN_A_CORE
Gyn Attending Pre-Op Note    Pt with hx of PMB for EUA and D and C today for diagnostic purposes.    Pt understands risks/benefits of surgery including but not limited to bleeding, infection, injury to bowel/bladder, nerve damage and even death.      HUNTER Rodríguez

## 2025-02-07 NOTE — CHART NOTE - NSCHARTNOTEFT_GEN_A_CORE
Patient taken back to the OR for EUA, EMB, D&C.     Patient now s/p EUA. Remainder of procedure aborted (see brief op note).     Gyn post-op recs  - Diet and fluid orders as per primary team  - Ambulation as tolerated, anticoagulation as per primary team  - Recommend oral analgesics, to be ordered per primary team  - Cont care w primary team    Performed w Dr. Anne Pack PGY1 Patient taken back to the OR for EUA, EMB, D&C.     Patient now s/p EUA. Remainder of procedure aborted (see brief op note).     Gyn post-op recs  - Diet and fluid orders as per primary team  - Ambulation as tolerated, anticoagulation as per primary team  - Recommend oral analgesics, to be ordered per primary team  - Cont care w primary team    Performed w Dr. Anne Pack PGY1    Gyn Attending    Unable to perform D and C due to very limited mobility of pt's legs and knees.  Pt unable to bend leg at knee bilaterally.  Pt also unable to abduct at hips.  Vaginal visualization was very poor and also unable to palpate or visualize cervix during surgery.  After 30 min, procedure was aborted.    For F/U rec:    1) Pt to be followed as outpt using sonography to monitor uterus  2) Schedule:  Pelvic sono q 3 mos for one year then q 6 mos x 1 year and q year  3) Above has been explained to pt and she has verbalized her understanding.    HUNTER Rodírguez

## 2025-02-07 NOTE — PRE-ANESTHESIA EVALUATION ADULT - NSANTHOSAYNRD_GEN_A_CORE
No. NOEMY screening performed.  STOP BANG Legend: 0-2 = LOW Risk; 3-4 = INTERMEDIATE Risk; 5-8 = HIGH Risk

## 2025-02-07 NOTE — PRE-ANESTHESIA EVALUATION ADULT - NSRADCARDRESULTSFT_GEN_ALL_CORE
< from: TTE W or WO Ultrasound Enhancing Agent (02.21.24 @ 08:52) >    CONCLUSIONS:      1. Technically difficult image quality.   2. Left ventricular endocardium is not well visualized; however, the left ventricular systolic function appears grossly normal.   3. There is normal LV mass and concentric remodeling.   4. There is mild (grade 1) left ventricular diastolic dysfunction.  5. 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 Given the very poor image quality of this study the nature of this gradient cannot be further charachterized.   6. The right ventricle is not well visualized.    ________________________________________________________________________________________  FINDINGS:     Left Ventricle:  Left ventricular systolic function is normal. There is mild (grade 1) left ventricular diastolic dysfunction. There is normal LV mass and concentric remodeling. 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 Given the very poor image quality of this study the nature of this gradient cannot be further charachterized. Left ventricular endocardium is notwell visualized; however, the left ventricular systolic function appears grossly normal.     Right Ventricle:  The right ventricle is not well visualized.     Left Atrium:  The left atrium is normal with an indexed volume of 20.69 ml/m².     Right Atrium:  The right atrium was not well visualized.     Aortic Valve:  The aortic valve anatomy cannot be determined.     Mitral Valve:  The mitral valve was not well visualized.     Tricuspid Valve:  The tricuspid valve was not well visualized. There isinsufficient tricuspid regurgitation detected to calculate pulmonary artery systolic pressure.     Pulmonic Valve:  The pulmonic valve was not well visualized.     Aorta:  The aortic root appears normal in size.     Pericardium:  No pericardial effusion seen.     Systemic Veins:  The inferior vena cava is normal in size (normal <2.1cm) with normal inspiratory collapse (normal >50%) consistent with normal right atrial pressure (~3, range 0-5mmHg).    < end of copied text >

## 2025-02-07 NOTE — BRIEF OPERATIVE NOTE - OPERATION/FINDINGS
EUA: 6 week anteverted uterus, No adnexal masses palpated b/l  Unable to palpate cervix. On speculum exam, no blood in vaginal vault. Severe contractures in both legs, patient unable to bend at knees and very limited hip abduction, severely limiting visualization of the vagina. On speculum exam, unable to visualize cervix, several attempts made to locate cervix using various different instruments however, procedure aborted to prevent harm and maintain safety of the patient.

## 2025-02-08 LAB
HCT VFR BLD CALC: 40.9 % — SIGNIFICANT CHANGE UP (ref 34.5–45)
HGB BLD-MCNC: 12.7 G/DL — SIGNIFICANT CHANGE UP (ref 11.5–15.5)
MCHC RBC-ENTMCNC: 29.2 PG — SIGNIFICANT CHANGE UP (ref 27–34)
MCHC RBC-ENTMCNC: 31.1 G/DL — LOW (ref 32–36)
MCV RBC AUTO: 94 FL — SIGNIFICANT CHANGE UP (ref 80–100)
NRBC # BLD: 0 /100 WBCS — SIGNIFICANT CHANGE UP (ref 0–0)
NRBC BLD-RTO: 0 /100 WBCS — SIGNIFICANT CHANGE UP (ref 0–0)
PLATELET # BLD AUTO: 173 K/UL — SIGNIFICANT CHANGE UP (ref 150–400)
RBC # BLD: 4.35 M/UL — SIGNIFICANT CHANGE UP (ref 3.8–5.2)
RBC # FLD: 13.6 % — SIGNIFICANT CHANGE UP (ref 10.3–14.5)
WBC # BLD: 7.01 K/UL — SIGNIFICANT CHANGE UP (ref 3.8–10.5)
WBC # FLD AUTO: 7.01 K/UL — SIGNIFICANT CHANGE UP (ref 3.8–10.5)

## 2025-02-08 RX ADMIN — SACUBITRIL AND VALSARTAN 1 TABLET(S): 49; 51 TABLET, FILM COATED ORAL at 05:38

## 2025-02-08 RX ADMIN — GABAPENTIN 100 MILLIGRAM(S): 800 TABLET ORAL at 21:57

## 2025-02-08 RX ADMIN — Medication 25 MILLIGRAM(S): at 05:37

## 2025-02-08 RX ADMIN — LEVOTHYROXINE SODIUM 125 MICROGRAM(S): 25 TABLET ORAL at 05:37

## 2025-02-08 RX ADMIN — Medication 1 APPLICATION(S): at 17:34

## 2025-02-08 RX ADMIN — BUDESONIDE 0.5 MILLIGRAM(S): 9 TABLET, EXTENDED RELEASE ORAL at 05:39

## 2025-02-08 RX ADMIN — Medication 75 MILLIGRAM(S): at 05:37

## 2025-02-08 RX ADMIN — BUSPIRONE HYDROCHLORIDE 10 MILLIGRAM(S): 5 TABLET ORAL at 11:49

## 2025-02-08 RX ADMIN — TRAMADOL HYDROCHLORIDE 50 MILLIGRAM(S): 100 TABLET, EXTENDED RELEASE ORAL at 11:25

## 2025-02-08 RX ADMIN — Medication 75 MILLIGRAM(S): at 17:33

## 2025-02-08 RX ADMIN — PANTOPRAZOLE 40 MILLIGRAM(S): 20 TABLET, DELAYED RELEASE ORAL at 11:49

## 2025-02-08 RX ADMIN — Medication 1 APPLICATION(S): at 17:35

## 2025-02-08 RX ADMIN — GABAPENTIN 100 MILLIGRAM(S): 800 TABLET ORAL at 13:20

## 2025-02-08 RX ADMIN — Medication 1 TABLET(S): at 11:48

## 2025-02-08 RX ADMIN — Medication 5 MILLIGRAM(S): at 05:37

## 2025-02-08 RX ADMIN — ACETAMINOPHEN, DIPHENHYDRAMINE HCL, PHENYLEPHRINE HCL 3 MILLIGRAM(S): 325; 25; 5 TABLET ORAL at 21:57

## 2025-02-08 RX ADMIN — ATORVASTATIN CALCIUM 40 MILLIGRAM(S): 80 TABLET, FILM COATED ORAL at 21:57

## 2025-02-08 RX ADMIN — SACUBITRIL AND VALSARTAN 1 TABLET(S): 49; 51 TABLET, FILM COATED ORAL at 17:36

## 2025-02-08 RX ADMIN — BUDESONIDE 0.5 MILLIGRAM(S): 9 TABLET, EXTENDED RELEASE ORAL at 17:34

## 2025-02-08 RX ADMIN — Medication 1 APPLICATION(S): at 05:39

## 2025-02-08 RX ADMIN — GABAPENTIN 100 MILLIGRAM(S): 800 TABLET ORAL at 05:38

## 2025-02-08 RX ADMIN — MONTELUKAST SODIUM 10 MILLIGRAM(S): 5 TABLET, CHEWABLE ORAL at 11:49

## 2025-02-08 RX ADMIN — TRAMADOL HYDROCHLORIDE 50 MILLIGRAM(S): 100 TABLET, EXTENDED RELEASE ORAL at 10:27

## 2025-02-08 RX ADMIN — DULOXETINE 60 MILLIGRAM(S): 20 CAPSULE, DELAYED RELEASE ORAL at 11:48

## 2025-02-08 RX ADMIN — Medication 25 MILLIGRAM(S): at 17:33

## 2025-02-08 RX ADMIN — TIOTROPIUM BROMIDE MONOHYDRATE 2 PUFF(S): 18 CAPSULE ORAL; RESPIRATORY (INHALATION) at 11:48

## 2025-02-08 NOTE — PROGRESS NOTE ADULT - ATTENDING COMMENTS
73 yo F with complex past medical history including being wheelchair bound due to leg contractures, PMHx COPD on 3 L NC at home, Obesity, Hypothyroidism, Diastolic HF previously on Lasix, Afib on Eliquis, Breast cancer s/p surgery, Asthma, Chronic lymphedema, Kidney cancer s/p partial nephrectomy, anxiety, presenting to the ER with blood in diaper. CT scan performed showing air and fluid in endometrial cavity. Pelvic US then performed for better characterization but only transabdominal performed, which was very limited due to habitus. MRI performed for better characterization which showed no air in the endometrial cavity and a thin 2mm endometrial lining. Now POD#1 s/p exam under anesthesia that was limited by patients immobility, cervix unable to be localized, and biopsy not performed. Given reassuring MRI finding and inability to obtain endometrial biopsy, plan for interval ultrasounds for monitoring q3-6 months. Discussed imaging findings, surgical findings, and plan extensively with patient and her son. Patient with complicated social situation with her  also currently admitted to the hospital with dementia and patient and her  both unfit for discharge home given physical limitations and medical comorbidities. Per son, he is working with social work to get both parents discharged to a nursing home together. Recommend coordination of GYN follow up with provider that is accessible for patient at discharge location. Does not currently have established GYN. No further GYN intervention at this time. Plan to sign off. Available for further questions.    Heidi Galan MD

## 2025-02-09 LAB
HCT VFR BLD CALC: 36.9 % — SIGNIFICANT CHANGE UP (ref 34.5–45)
HGB BLD-MCNC: 11.2 G/DL — LOW (ref 11.5–15.5)
MCHC RBC-ENTMCNC: 28.8 PG — SIGNIFICANT CHANGE UP (ref 27–34)
MCHC RBC-ENTMCNC: 30.4 G/DL — LOW (ref 32–36)
MCV RBC AUTO: 94.9 FL — SIGNIFICANT CHANGE UP (ref 80–100)
NRBC # BLD: 0 /100 WBCS — SIGNIFICANT CHANGE UP (ref 0–0)
NRBC BLD-RTO: 0 /100 WBCS — SIGNIFICANT CHANGE UP (ref 0–0)
PLATELET # BLD AUTO: 172 K/UL — SIGNIFICANT CHANGE UP (ref 150–400)
RBC # BLD: 3.89 M/UL — SIGNIFICANT CHANGE UP (ref 3.8–5.2)
RBC # FLD: 14.1 % — SIGNIFICANT CHANGE UP (ref 10.3–14.5)
WBC # BLD: 8.75 K/UL — SIGNIFICANT CHANGE UP (ref 3.8–10.5)
WBC # FLD AUTO: 8.75 K/UL — SIGNIFICANT CHANGE UP (ref 3.8–10.5)

## 2025-02-09 RX ORDER — APIXABAN 5 MG/1
5 TABLET, FILM COATED ORAL EVERY 12 HOURS
Refills: 0 | Status: DISCONTINUED | OUTPATIENT
Start: 2025-02-09 | End: 2025-02-13

## 2025-02-09 RX ADMIN — GABAPENTIN 100 MILLIGRAM(S): 800 TABLET ORAL at 13:03

## 2025-02-09 RX ADMIN — PANTOPRAZOLE 40 MILLIGRAM(S): 20 TABLET, DELAYED RELEASE ORAL at 11:22

## 2025-02-09 RX ADMIN — GABAPENTIN 100 MILLIGRAM(S): 800 TABLET ORAL at 22:48

## 2025-02-09 RX ADMIN — GABAPENTIN 100 MILLIGRAM(S): 800 TABLET ORAL at 06:30

## 2025-02-09 RX ADMIN — BUSPIRONE HYDROCHLORIDE 10 MILLIGRAM(S): 5 TABLET ORAL at 11:21

## 2025-02-09 RX ADMIN — Medication 75 MILLIGRAM(S): at 17:26

## 2025-02-09 RX ADMIN — Medication 1 TABLET(S): at 11:21

## 2025-02-09 RX ADMIN — Medication 75 MILLIGRAM(S): at 06:24

## 2025-02-09 RX ADMIN — TIOTROPIUM BROMIDE MONOHYDRATE 2 PUFF(S): 18 CAPSULE ORAL; RESPIRATORY (INHALATION) at 11:21

## 2025-02-09 RX ADMIN — SACUBITRIL AND VALSARTAN 1 TABLET(S): 49; 51 TABLET, FILM COATED ORAL at 09:23

## 2025-02-09 RX ADMIN — Medication 25 MILLIGRAM(S): at 17:26

## 2025-02-09 RX ADMIN — BUDESONIDE 0.5 MILLIGRAM(S): 9 TABLET, EXTENDED RELEASE ORAL at 06:25

## 2025-02-09 RX ADMIN — SACUBITRIL AND VALSARTAN 1 TABLET(S): 49; 51 TABLET, FILM COATED ORAL at 17:26

## 2025-02-09 RX ADMIN — ACETAMINOPHEN, DIPHENHYDRAMINE HCL, PHENYLEPHRINE HCL 3 MILLIGRAM(S): 325; 25; 5 TABLET ORAL at 22:48

## 2025-02-09 RX ADMIN — MONTELUKAST SODIUM 10 MILLIGRAM(S): 5 TABLET, CHEWABLE ORAL at 11:21

## 2025-02-09 RX ADMIN — Medication 1 APPLICATION(S): at 06:25

## 2025-02-09 RX ADMIN — LEVOTHYROXINE SODIUM 125 MICROGRAM(S): 25 TABLET ORAL at 06:24

## 2025-02-09 RX ADMIN — Medication 5 MILLIGRAM(S): at 06:24

## 2025-02-09 RX ADMIN — Medication 1 APPLICATION(S): at 17:27

## 2025-02-09 RX ADMIN — BUDESONIDE 0.5 MILLIGRAM(S): 9 TABLET, EXTENDED RELEASE ORAL at 17:26

## 2025-02-09 RX ADMIN — ATORVASTATIN CALCIUM 40 MILLIGRAM(S): 80 TABLET, FILM COATED ORAL at 22:48

## 2025-02-09 RX ADMIN — APIXABAN 5 MILLIGRAM(S): 5 TABLET, FILM COATED ORAL at 17:26

## 2025-02-09 RX ADMIN — DULOXETINE 60 MILLIGRAM(S): 20 CAPSULE, DELAYED RELEASE ORAL at 11:21

## 2025-02-09 RX ADMIN — Medication 1 APPLICATION(S): at 17:28

## 2025-02-09 NOTE — PATIENT PROFILE ADULT - FALL HARM RISK
FOOT AND ANKLE SURGERY/PODIATRY PROGRESS NOTE        ASSESSMENT:   Diabetic ulceration right heel   Ulceration right leg  Acute osteomyelitis right calcaneus  Diabetic ulceration left hallux   Diabetic ulceration left foot       TREATMENT:  -Patient asleep at the time of my visit.  Successful intervention with interventional radiology yesterday.      -Podiatry service to visit with the patient tomorrow to discuss possible debridement bilateral foot ulcerations versus continued use of the wound VAC.    -Medical management per hospitalist.  Antibiotics per ID.    Amol Patricio DPM  [facility]/Foot & Ankle Surgery      HPI: Janice Nowak was seen again today for bilateral lower extremity ulcerations.  The patient was not able to be aroused during my visit today.  Patient underwent angiogram with intervention with interventional radiology yesterday.    Past Medical History:   Diagnosis Date    Coronary artery disease     Diabetes (H)     Hypertension     Pleural effusion     Sleep apnea     Thyroid disease        Past Surgical History:   Procedure Laterality Date    BACK SURGERY      BONE EXOSTOSIS EXCISION Right 10/24/2024    Procedure: PARTIAL CALCANECTOMY RIGHT FOOT;  Surgeon: Amol Patricio DPM;  [place] [facility] [place]    CHOLECYSTECTOMY      ENDOSCOPIC ULTRASOUND UPPER GASTROINTESTINAL TRACT (GI) N/A 01/02/2025    Procedure: ENDOSCOPIC ULTRASOUND, ESOPHAGOSCOPY / UPPER GASTROINTESTINAL TRACT (GI) with Fine Needle Aspiration;  Surgeon: Kong Medina MD;  Location: [facility] OR    INCISION AND DRAINAGE FOOT, COMBINED Right 10/24/2024    Procedure: INCISION AND DRAINAGE;  Surgeon: Amol Patricio DPM;  [place] [facility] [place]    INCISION AND DRAINAGE FOOT, COMBINED Bilateral 1/27/2025    Procedure: INCISION AND DRAINAGE bilateral feet with application of TheraSkin graft;  Surgeon: Amol Patricio DPM;  [place] [facility] [place]    IR CHEST PORT PLACEMENT > 5  YRS OF AGE  01/16/2025    IR LOWER EXTREMITY ANGIOGRAM LEFT  01/16/2025    IR LOWER EXTREMITY ANGIOGRAM LEFT  2/8/2025    IR LOWER EXTREMITY ANGIOGRAM RIGHT  12/26/2024    IRRIGATION AND DEBRIDEMENT FOOT, COMBINED Right 10/24/2024    Procedure: AND DEBRIDEMENT RIGHT HEEL,;  Surgeon: Amol Patricio DPM;  Location: [facility] [place]    ORTHOPEDIC SURGERY      PICC SINGLE LUMEN PLACEMENT  10/30/2024    VASCULAR SURGERY         Allergies   Allergen Reactions    Exenatide Unknown    Heparin Unknown    Liraglutide Unknown    Lisinopril Cough    Morphine Unknown         Current Facility-Administered Medications:     acetaminophen (TYLENOL) tablet 650 mg, 650 mg, Oral, Q4H PRN, 650 mg at 02/05/25 1711 **OR** acetaminophen (TYLENOL) Suppository 650 mg, 650 mg, Rectal, Q4H PRN, Ha Ashford PA-C    aspirin EC tablet 81 mg, 81 mg, Oral, QPM, Ha Ashford PA-C, 81 mg at 02/08/25 2047    benzocaine-menthol (CHLORASEPTIC) 6-10 MG lozenge 1 lozenge, 1 lozenge, Buccal, Q1H PRN, Ha Ashford PA-C    calcium carbonate (TUMS) chewable tablet 1,000 mg, 1,000 mg, Oral, 4x Daily PRN, Ha Ashford PA-C    clopidogrel (PLAVIX) tablet 75 mg, 75 mg, Oral, QAM, Ha Ashford PA-C, 75 mg at 02/09/25 0836    Continuing statin from home medication list OR statin order already placed during this visit, , Does not apply, DOES NOT GO TO [facility] Amanjit S, MD    glucose gel 15-30 g, 15-30 g, Oral, Q15 Min PRN **OR** dextrose 50 % injection 25-50 mL, 25-50 mL, Intravenous, Q15 Min PRN **OR** glucagon injection 1 mg, 1 mg, Subcutaneous, Q15 Min PRN, Ha Ashford PA-C    empagliflozin (JARDIANCE) tablet 10 mg, 10 mg, Oral, Daily, Ha Ashford PA-C, 10 mg at 02/09/25 0837    guaiFENesin (MUCINEX) 12 hr tablet 1,200 mg, 1,200 mg, Oral, BID PRN, Ha Ashford PA-C    Hold: Metformin and metformin containing medications on day of the procedure and for 48 hours after IV contrast given- Patients with acute  kidney injury or severe chronic kidney disease (stage IV or stage V; i.e., eGFR less than 30), , Does not apply, HOLD, Silvestre Jc MD    hydrALAZINE (APRESOLINE) injection 10 mg, 10 mg, Intravenous, Q4H PRN, Toi Robledo MD    [Held by provider] HYDROmorphone (PF) (DILAUDID) injection 0.3 mg, 0.3 mg, Intravenous, Q3H PRN, June Ortega MD, 0.3 mg at 02/07/25 1436    insulin aspart (NovoLOG) injection (RAPID ACTING), 1-7 Units, Subcutaneous, TID AC, Ha Ashford PA-C, 1 Units at 02/08/25 1709    insulin aspart (NovoLOG) injection (RAPID ACTING), 1-5 Units, Subcutaneous, At Bedtime, Ha Ashford PA-C    insulin glargine (LANTUS PEN) injection 30 Units, 30 Units, Subcutaneous, QAM, Ha Ashford PA-C, 30 Units at 02/09/25 0837    levothyroxine (SYNTHROID/LEVOTHROID) tablet 50 mcg, 50 mcg, Oral, QAM, Ha Ashford PA-C, 50 mcg at 02/09/25 0654    lidocaine (LMX4) cream, , Topical, Q1H PRN, Ha Ashford PA-C    lidocaine 1 % 0.1-1 mL, 0.1-1 mL, Other, Q1H PRN, Ha Ashford PA-C    [Held by provider] losartan (COZAAR) tablet 50 mg, 50 mg, Oral, At Bedtime, Ha Ashford PA-C, 50 mg at 02/06/25 2230    meclizine (ANTIVERT) tablet 50 mg, 50 mg, Oral, TID PRN, Ha Ashford PA-C    melatonin tablet 1 mg, 1 mg, Oral, At Bedtime PRN, Ha Ashford PA-C, 1 mg at 02/09/25 0045    metFORMIN (GLUCOPHAGE) tablet 1,000 mg, 1,000 mg, Oral, BID w/meals, Ha Ashford PA-C, 1,000 mg at 02/09/25 0836    multivitamin, therapeutic (THERA-VIT) tablet 1 tablet, 1 tablet, Oral, Daily, Heather Brewer MD, 1 tablet at 02/09/25 0836    naloxone (NARCAN) injection 0.2 mg, 0.2 mg, Intravenous, Q2 Min PRN **OR** naloxone (NARCAN) injection 0.4 mg, 0.4 mg, Intravenous, Q2 Min PRN **OR** naloxone (NARCAN) injection 0.2 mg, 0.2 mg, Intramuscular, Q2 Min PRN **OR** naloxone (NARCAN) injection 0.4 mg, 0.4 mg, Intramuscular, Q2 Min PRN, June Ortega MD    ondansetron (ZOFRAN ODT) ODT tab 4  mg, 4 mg, Oral, Q6H PRN **OR** ondansetron (ZOFRAN) injection 4 mg, 4 mg, Intravenous, Q6H PRN, Ha Ashford PA-C    piperacillin-tazobactam (ZOSYN) 3.375 g vial to attach to  mL bag, 3.375 g, Intravenous, Q8H, Toi Robledo MD, 3.375 g at 02/09/25 0621    rosuvastatin (CRESTOR) tablet 40 mg, 40 mg, Oral, Daily, Ha Ashford PA-C, 40 mg at 02/09/25 0836    senna-docusate (SENOKOT-S/PERICOLACE) 8.6-50 MG per tablet 1 tablet, 1 tablet, Oral, BID PRN **OR** senna-docusate (SENOKOT-S/PERICOLACE) 8.6-50 MG per tablet 2 tablet, 2 tablet, Oral, BID PRN, Ha Ashfodr PA-C    sodium chloride (PF) 0.9% PF flush 3 mL, 3 mL, Intracatheter, Q8H, Ha Ashford PA-C, 3 mL at 02/08/25 2206    sodium chloride (PF) 0.9% PF flush 3 mL, 3 mL, Intracatheter, q1 min prn, Ha Ashford PA-C    sodium chloride (PF) 0.9% PF flush 3 mL, 3 mL, Intracatheter, q1 min prn, Toi Robledo MD    sodium chloride 0.9 % infusion, , Intravenous, Continuous, Toi Robleod MD, Infusion stopped per MD order at 02/08/25 1757    [Held by provider] spironolactone (ALDACTONE) tablet 25 mg, 25 mg, Oral, Daily, Ha Ashford PA-C, 25 mg at 02/07/25 1002    thiamine (B-1) tablet 100 mg, 100 mg, Oral, Daily, Heather Brewer MD, 100 mg at 02/09/25 0837    [Held by provider] torsemide (DEMADEX) tablet 10 mg, 10 mg, Oral, Daily, Ha Ashford PA-C, 10 mg at 02/07/25 1003    traMADol (ULTRAM) tablet 50 mg, 50 mg, Oral, Q6H PRN, uJne Ortega MD, 50 mg at 02/07/25 2257    vancomycin (VANCOCIN) 1,000 mg in 200 mL dextrose intermittent infusion, 1,000 mg, Intravenous, Q24H, Simón Bain MD, Last Rate: 200 mL/hr at 02/08/25 0926, 1,000 mg at 02/08/25 0926    vitamin C (ASCORBIC ACID) tablet 500 mg, 500 mg, Oral, Daily, Heather Brewer MD, 500 mg at 02/09/25 0837    wound support modular (EXPEDITE) bottle 60 mL, 60 mL, Oral, Daily, Heather Brewer MD, 60 mL at 02/09/25 0837    zinc oxide (DESITIN) 40 % paste, , Topical, Q12H,  Edu Conley DO, Given at 25 2301    zinc sulfate (ZINCATE) capsule 220 mg, 220 mg, Oral, Daily, Heather Brewer MD, 220 mg at 25 0837    Family History   Problem Relation Age of Onset    Anesthesia Reaction No family hx of     Thrombosis No family hx of        Social History     Socioeconomic History    Marital status:      Spouse name: Not on file    Number of children: Not on file    Years of education: Not on file    Highest education level: Not on file   Occupational History    Not on file   Tobacco Use    Smoking status: Former     Current packs/day: 0.00     Types: Cigarettes     Quit date:      Years since quittin.1     Passive exposure: Never    Smokeless tobacco: Never   Vaping Use    Vaping status: Never Used   Substance and Sexual Activity    Alcohol use: Not Currently     Alcohol/week: 5.0 standard drinks of alcohol     Types: 5 Cans of beer per week     Comment: once in awhile    Drug use: Never    Sexual activity: Not on file   Other Topics Concern    Not on file   Social History Narrative    Not on file     Social Drivers of Health     Financial Resource Strain: Low Risk  (2025)    Financial Resource Strain     Within the past 12 months, have you or your family members you live with been unable to get utilities (heat, electricity) when it was really needed?: No   Food Insecurity: Low Risk  (2025)    Food Insecurity     Within the past 12 months, did you worry that your food would run out before you got money to buy more?: No     Within the past 12 months, did the food you bought just not last and you didn t have money to get more?: No   Transportation Needs: Low Risk  (2025)    Transportation Needs     Within the past 12 months, has lack of transportation kept you from medical appointments, getting your medicines, non-medical meetings or appointments, work, or from getting things that you need?: No   Physical Activity: Not on file   Stress: Not on file  "  Social Connections: Not on file   Interpersonal Safety: Low Risk  (2/7/2025)    Interpersonal Safety     Do you feel physically and emotionally safe where you currently live?: Yes     Within the past 12 months, have you been hit, slapped, kicked or otherwise physically hurt by someone?: No     Within the past 12 months, have you been humiliated or emotionally abused in other ways by your partner or ex-partner?: No   Housing Stability: Low Risk  (2/7/2025)    Housing Stability     Do you have housing? : Yes     Are you worried about losing your housing?: No       10 point Review of Systems is negative except for bilateral lower extremity ulcers.  Which is noted in HPI.     /56 (BP Location: Left arm)   Pulse 84   Temp 98.2  F (36.8  C) (Oral)   Resp 18   Ht 1.727 m (5' 8\")   Wt 90.2 kg (198 lb 14.4 oz)   SpO2 98%   BMI 30.24 kg/m      BMI= Body mass index is 30.24 kg/m .    OBJECTIVE:  General appearance: Patient is alert and fully cooperative with history & exam.  No sign of distress is noted during the visit.    Wound VAC intact lateral right leg ulceration.  TheraSkin grafts intact bilateral feet.  No erythema bilateral feet.      Imaging:     IR Lower Extremity Angiogram Left    Result Date: 2/8/2025  LOCATION: [facility] DATE: 2/8/2025 PROCEDURE: LEFT LOWER EXTREMITY DIAGNOSTIC ARTERIOGRAPHY, ROTATIONAL ATHERECTOMY OF THE LEFT COMMON FEMORAL, SUPERFICIAL FEMORAL, POPLITEAL, ANTERIOR TIBIAL AND DORSALIS PEDIS ARTERIES, BALLOON ANGIOPLASTY OF THE LEFT COMMON FEMORAL, SUPERFICIAL FEMORAL,  POPLITEAL, ANTERIOR TIBIAL AND DORSALIS PEDIS ARTERIES, STENT PLACEMENT IN THE PROXIMAL LEFT SUPERFICIAL FEMORAL ARTERY, ULTRASOUND GUIDANCE FOR VASCULAR ACCESS, MODERATE SEDATION INTERVENTIONAL RADIOLOGIST: Silvestre Jc MD INDICATION: 77-year-old male with nonhealing left foot wounds/critical limb threatening ischemia, plan for left lower extremity angiogram with intervention for limb " salvage. INDICATION FOR DIAGNOSTIC ANGIOGRAPHY: Diagnostic angiography was required to precisely identify plaque morphology to aid in evaluation and management CONSENT: The risks, benefits and alternatives of the procedure were discussed with the patient  in detail. All questions were answered. Informed consent was given to proceed with the procedure. MODERATE SEDATION: Versed 4 mg IV; Fentanyl 200 mcg IV. During the time out, immediately prior to the administration of medications, the patient was reassessed for adequacy to receive conscious sedation.  Under physician supervision, Versed and fentanyl were administered for moderate sedation. Pulse oximetry, heart rate and blood pressure were continuously monitored by an independent trained observer. The physician spent 160 minutes of face-to-face sedation time with the patient. CONTRAST: 94 cc Visipaque ADDITIONAL MEDICATIONS: Argatroban FLUOROSCOPIC TIME: 58.7 minutes. RADIATION DOSE: Air Kerma: 2433 mGy. COMPLICATIONS: No immediate complications. UNIVERSAL PROTOCOL: The operative site was marked and any prior imaging was reviewed. Required items including blood products, implants, devices and special equipment was made available. Patient identity was confirmed either verbally, with demographic information, hospital assigned identification or other identification markers. A timeout was performed immediately prior to the procedure. STERILE BARRIER TECHNIQUE: Maximum sterile barrier technique was used. Cutaneous antisepsis was performed at the operative site with application of 2% chlorhexidine and large sterile drape. Prior to the procedure, the  and assistant performed hand hygiene and wore hat, mask, sterile gown, and sterile gloves during the entire procedure. PROCEDURE:  Access was achieved into the right common femoral artery utilizing real-time ultrasound guidance and a micropuncture access kit. Imaging demonstrates a patent and compressible artery.  Permanent images were stored to the patient's medical record. Conversion was made for a 5 Palauan vascular sheath, which was attached to a continuous saline infusion. The Omni Flush catheter was advanced to the caudal abdominal aorta and selective catheterization of the left external iliac artery was performed. A diagnostic left lower extremity angiogram was obtained. Imaging demonstrates patent left common femoral, profunda femoral, superficial femoral and popliteal arteries. There is focal, high-grade stenosis involving the proximal superficial femoral artery. There is moderate stenosis involving the distal superficial femoral artery and above-knee popliteal artery. Occlusion of the proximal anterior tibial artery. The posterior tibial artery is patent to the foot without significant stenosis. Disease involving the mid/distal peroneal artery. Occluded dorsalis pedis artery. Systemic anticoagulation was performed with Argatroban as per institutional protocol and monitored with serial activated clotting times. Exchange is made for a 5 Palauan x 55 cm vascular sheath which was positioned in the left common femoral artery. An angled 018 crossing catheter and guidewire were used to selectively catheterize the anterior tibial artery. Contrast injection demonstrates  multi segment occlusions involving the anterior tibial artery. The guidewire and catheter were carefully advanced under fluoroscopic guidance to the dorsalis pedis artery. A small volume of contrast was injected, confirming intraluminal crossing. Exchange is made for an 014 Viperwire which was positioned in the distal dorsalis pedis artery. The crossing catheter was removed. Over-the-wire exchange is made for a 1.25 Solid CSI atherectomy catheter. Rotational atherectomy of disease in the common femoral, proximal superficial femoral, distal superficial femoral and above-knee popliteal arteries was performed in the low, medium and high speeds. Rotational  atherectomy of disease in the proximal anterior tibial artery was performed. There is inability to advance the CSI device to the mid and distal anterior tibial artery secondary to significant disease. Over-the-wire exchange is made for a 2 mm x 1 20 mm angioplasty balloon and angioplasty of the proximal/mid anterior tibial artery was performed. There is inability to advance the balloon to the distal vessel. Over-the-wire exchange is made for a 1.5 mm x 2 cm angioplasty balloon. The balloon was carefully advanced to the distal anterior tibial artery and balloon angioplasty of significant stenosis was performed to allow for passage of the atherectomy device. Over-the-wire exchange is again made for the CSI 1.25 Solid atherectomy catheter and  atherectomy of the remaining metatarsals distal anterior tibial and dorsalis pedis arteries was performed in the low, medium and high speeds. Over-the-wire exchange is made for a 3 mm x 120 mm angioplasty balloon and angioplasty of the entire anterior tibial and dorsalis pedis arteries was performed. Over-the-wire exchange is made for a 6 mm x 15 cm InPact drug-eluting balloon which was positioned in the distal superficial femoral and above-knee popliteal arteries and inflated to burst pressure for 3 minutes. Over-the-wire exchange is made for a 6 mm  x 15 cm InPact drug-eluting balloon which was positioned in the common femoral and proximal superficial femoral arteries and inflated to burst pressure for 3 minutes. Post drug coated balloon angioplasty angiography demonstrates persistent moderate stenosis involving the proximal superficial femoral artery lesion. This residual stenosis in the distal superficial femoral and popliteal arteries. Over-the-wire exchange is made for a 6 Sami x 40 cm sheath which was positioned external iliac artery. Over-the-wire exchange is made for a 6 mm x 2 cm high pressure balloon which was positioned and inflated in the proximal superficial  femoral artery lesion. Post high pressure balloon angioplasty angiography demonstrates persistent residual stenosis. Over-the-wire exchange is made for a 7 mm x 4 cm Jill drug-eluting stent which was positioned in the proximal superficial femoral artery lesion and deployed. Post deployment balloon into the pelvis was performed utilizing a 7 mm x 4 cm balloon. Post stent angiography demonstrates minimal residual stenosis at this location with small focus of active extravasation. The area of bleeding was successfully treated with low pressure, prolonged balloon angioplasty for 2 minutes. At this point, the procedure was considered complete. Repeat check the activated clotting time demonstrated persistent elevated values. Exchange is made for a short 6 Czech sheath which was secured in place.     IMPRESSION:  1. Left lower extremity angiography demonstrates focal, high-grade stenosis involving the proximal superficial femoral artery. Moderate stenosis involving the distal superficial femoral and above-knee popliteal arteries. Dominant runoff to the foot via the posterior tibial artery. Disease involving the mid/distal peroneal artery. Occluded anterior tibial and dorsalis pedis arteries. 2. Successful crossing, recanalization, orbital atherectomy and angioplasty of the occluded anterior tibial and dorsalis pedis arteries. Status post drug-coated balloon angioplasty of the distal superficial femoral and popliteal arteries. Status post drug-eluting stent placement in the proximal superficial femoral artery lesion. 3. Post intervention angiography demonstrates excellent result with three-vessel runoff to the foot. There is marked improvement in perfusion of the foot on post intervention imaging. PLAN: Four hours of bedrest post sheath removal. Palpable left posterior tibial and dorsalis pedis pulses post procedure. Continue Crestor 40 mg once daily, aspirin 81 mg once daily and Plavix 75 mg once daily.     US Lower  Extremity Arterial Duplex Bilateral    Result Date: 2/6/2025  EXAM: US LOWER EXTREMITY ARTERIAL DUPLEX BILATERAL LOCATION: [facility] DATE: 2/6/2025 INDICATION: nonhealing LE wounds COMPARISON: CT abdomen and pelvis 2/4/2025 TECHNIQUE: Duplex utilizing 2D gray-scale imaging, Doppler interrogation with color-flow and spectral waveform analysis. FINDINGS: RIGHT: Patent arteries throughout the right lower extremity. Waveforms are multiphasic with the exception of the right peroneal artery. Atherosclerotic disease is noted throughout the right lower extremity. LEFT: Patent arteries throughout the left lower extremity. Waveforms are multiphasic in the external iliac artery. Waveforms are monophasic in the common femoral, profunda femoral, superficial femoral, popliteal and tibial vessels suggesting a stenosis between the external iliac and common femoral artery that is not visualized on ultrasound. RIGHT LOWER EXTREMITY ARTERIAL ASSESSMENT: External iliac artery 174 cm/s Common femoral artery: 128 cm/s Profunda femoris artery: 50 cm/s SFA (proximal): 132 cm/s SFA (mid): 68 cm/s SFA (distal): 76 cm/s Popliteal artery: 71 cm/s Posterior tibial artery: 98 cm/s Anterior tibial artery: 30 cm/s Dorsalis pedis artery: 40 cm/s LEFT LOWER EXTREMITY ARTERIAL ASSESSMENT: External iliac artery 198 cm/s Common femoral artery: 167 cm/s Profunda femoris artery: 50 cm/s SFA (proximal): 62 cm/s SFA (mid): 63 cm/s SFA (distal): 59 cm/s Popliteal artery: 82 cm/s Posterior tibial artery: 64 cm/s Anterior tibial artery: 53 cm/s Dorsalis pedis artery: 18 cm/s     IMPRESSION: 1.  Patent arteries throughout the right lower extremity. 2.  Patent arteries throughout the left lower extremity. However waveforms become monophasic in the common femoral artery, suggesting stenosis not visualized on ultrasound.    US SONNY Doppler No Exercise 1-2 Levels Bilateral    Result Date: 2/6/2025  EXAM: RESTING ANKLE-BRACHIAL INDICES  (ABIs) LOCATION: [facility] DATE: 02/06/2025 INDICATION: Please obtain digit pressures, no healing LE wounds. COMPARISON: None. SONNY FINDINGS: RIGHT Brachial: 157 Ankle (PT): Not performed due to bilateral ankle dressings in place. Ankle (DP): Not performed due to bilateral ankle dressings in place. Digit: Greater than 240 Index: Noncompressible LEFT Ankle (PT): Not performed due to bilateral ankle dressings in place. Ankle (DP): Not performed due to bilateral ankle dressings in place. Digit: Greater than 240 Index: Noncompressible WAVEFORMS: Absent right 2nd through 5th digital waveforms and absent left 2nd through 5th digital waveforms.     IMPRESSION: 1.  Bilateral ABIs were not calculated secondary to overlying bandages. 2.  Bilateral digital brachial pressures show noncompressible vessels. 3.  Absent right 2nd through 5th digital waveforms bilaterally.    MR Foot Left w/o Contrast    Result Date: 2/5/2025  EXAM: MR FOOT LEFT W/O CONTRAST LOCATION: [facility] DATE: 2/5/2025 INDICATION: Ulcer debridement with TheraSkin application, dorsal left foot, post op day 9. COMPARISON: Left foot radiographic exam 2/5/2025 TECHNIQUE: Unenhanced. FINDINGS: Postoperative change with skin staples are seen along the dorsum of the great toe extending from the MTP joint to near the IP joint. Adjacent susceptibility artifact. No convincing evidence for great toe acute osteomyelitis. The lesser toes are negative for fracture or acute osteomyelitis. No metatarsal fracture or evidence for metatarsal stress fracture. Similar postoperative changes are seen along the dorsum of the more proximal foot at the level of the naviculocuneiform joints and more proximally. No  convincing evidence for osteomyelitis in the tarsal bones were imaged. Mild scattered degenerative change in the left foot including at the first MTP and metatarsal sesamoid joints. Degenerative change in the midfoot. No sizable  joint effusion. No acute tendon injury. No tenosynovitis. No significant tendinopathy. Distal anterior tibialis and peroneus longus intact. Lisfranc ligament intact. No midfoot subluxation. Generalized foot muscle atrophy is moderate to severe with denervation muscle edema. Visualized plantar fascia is intact. No drainable fluid collection. Dorsal foot soft tissue swelling.     IMPRESSION: 1.  Postoperative changes related to reported ulcer debridement with skin grafting along the dorsum of the great toe and dorsum of the proximal foot. 2.  No convincing MR evidence for left foot acute osteomyelitis, with particular attention at the great toe. 3.  No evidence for acute foot fracture or stress fracture. 4.  Scattered degenerative change including in the midfoot and at the first MTP and metatarsal sesamoid joints. 5.  Diffuse acute on chronic denervation edema throughout the intrinsic musculature of the left foot. 6.  Dorsal foot soft tissue swelling. No soft tissue abscess.    MR Ankle Right w/o Contrast    Result Date: 2/5/2025  EXAM: MR ANKLE RIGHT W/O CONTRAST LOCATION: [facility] DATE: 2/5/2025 INDICATION: Ulceration with TheraSkin application heel and lateral ankle region, post op day # 9.  Wound infected.  History of osteomyelitis of right heel as well. COMPARISON: Calcaneus radiographic exam 2/5/2025. Foot MRI 10/8/2024. TECHNIQUE: Unenhanced. FINDINGS: TENDONS: -Peroneal: Peroneus longus and brevis tendons are intact. Mild tendinopathy. No significant lateral tenosynovitis. No subluxation. -Medial: Posterior tibialis tendon is intact. Mild posterior tibialis tendinopathy. Trace distal posterior tibialis tendon sheath fluid. Flexor digitorum longus and flexor hallucis longus tendons are normal. No tenosynovitis. -Anterior: Anterior tibialis, extensor hallucis longus, and extensor digitorum longus tendons are normal. No tenosynovitis. -Achilles: No significant tendinopathy or  paratenonitis. LIGAMENTS: -Anterior talofibular ligament: Intact. -Calcaneofibular ligament: Intact. -Posterior talofibular ligament: Intact. -Syndesmotic inferior tibiofibular ligaments: Intact. -Deltoid ligament complex: Intact. -Spring ligament complex: Intact. JOINTS AND BONES: -Abnormal edema-like marrow signal intensity posterior calcaneus deep to what appears to be an ulcer along the posterior heel, compatible with osteomyelitis. No acute ankle or hindfoot fracture. No calcaneus stress fracture. 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 Scattered midfoot arthrosis. Trace ankle and subtalar joint effusions. SOFT TISSUES: -Plantar fascia: Chronic thickening proximal medial bundle plantar fascia. No acute inflammation or discrete tear. -Sinus tarsi and tarsal tunnel: Preserved sinus Tarsi fat signal. No space-occupying mass is seen along the course of the tarsal tunnel. -Muscles: Global intrinsic foot muscle atrophy with patchy muscle edema suggestive of denervation edema. Broad-based ulcer and adjacent skin staples posterior heel. No soft tissue abscess.     IMPRESSION: 1.  Broad-based soft tissue ulcer posterior heel with adjacent skin staples and MR findings compatible with adjacent posterior calcaneus osteomyelitis. 2.  Unchanged moderate to severe talonavicular chondromalacia. 3.  Mild peroneus longus and brevis tendinopathy. Mild posterior tibialis tendinopathy.    XR Foot Left 2 Views    Result Date: 2/5/2025  EXAM: XR ANKLE RIGHT 2 VIEWS, XR FOOT LEFT 2 VIEWS, XR CALCANEUS RIGHT G/E 2 VIEWS LOCATION: [facility] DATE: 2/5/2025 INDICATION: Lateral ankle wound. COMPARISON: Radiographs 10/7/2024 and MRI 10/8/2024     IMPRESSION: Right: Degenerative arthrosis of the talonavicular joint. Ankle joint spaces are otherwise maintained and normally aligned. Intact ankle mortise. No acute fracture. Skin staples over the posterior heel with a  bones(Osteoporosis,prev fx,steroid use,metastatic bone ca)/other broad-based ulceration evident. No radiographic evidence of osteomyelitis. Left: Screw fixation of the distal tibia. Skin staples over the dorsal midfoot and first toe. No acute fracture or radiographic evidence of osteomyelitis.     XR Calcaneus Right G/E 2 Views    Result Date: 2/5/2025  EXAM: XR ANKLE RIGHT 2 VIEWS, XR FOOT LEFT 2 VIEWS, XR CALCANEUS RIGHT G/E 2 VIEWS LOCATION: [facility] DATE: 2/5/2025 INDICATION: Lateral ankle wound. COMPARISON: Radiographs 10/7/2024 and MRI 10/8/2024     IMPRESSION: Right: Degenerative arthrosis of the talonavicular joint. Ankle joint spaces are otherwise maintained and normally aligned. Intact ankle mortise. No acute fracture. Skin staples over the posterior heel with a broad-based ulceration evident. No radiographic evidence of osteomyelitis. Left: Screw fixation of the distal tibia. Skin staples over the dorsal midfoot and first toe. No acute fracture or radiographic evidence of osteomyelitis.     XR Ankle Right 2 Views    Result Date: 2/5/2025  EXAM: XR ANKLE RIGHT 2 VIEWS, XR FOOT LEFT 2 VIEWS, XR CALCANEUS RIGHT G/E 2 VIEWS LOCATION: [facility] DATE: 2/5/2025 INDICATION: Lateral ankle wound. COMPARISON: Radiographs 10/7/2024 and MRI 10/8/2024     IMPRESSION: Right: Degenerative arthrosis of the talonavicular joint. Ankle joint spaces are otherwise maintained and normally aligned. Intact ankle mortise. No acute fracture. Skin staples over the posterior heel with a broad-based ulceration evident. No radiographic evidence of osteomyelitis. Left: Screw fixation of the distal tibia. Skin staples over the dorsal midfoot and first toe. No acute fracture or radiographic evidence of osteomyelitis.     US Abdomen Limited    Result Date: 2/4/2025  EXAM: US ABDOMEN LIMITED LOCATION: [facility] DATE: 02/04/2025 INDICATION: History of HCC. Here with weakness, increased LFTs. COMPARISON: CT 02/04/2025.  TECHNIQUE: Limited abdominal ultrasound. FINDINGS: GALLBLADDER: Cholecystectomy. BILE DUCTS: No biliary dilatation. The common duct measures 3.8 mm. LIVER: Cirrhotic appearance with coarsened echotexture and nodular contour. Solid 2.5 cm subcapsular mass in the superior right hepatic lobe compatible with neoplasm. No focal mass. RIGHT KIDNEY: No hydronephrosis. PANCREAS: The visualized portions are normal. No ascites. Splenomegaly.     IMPRESSION: 1.  Cirrhosis with a 2.5 cm solid subcapsular mass in the superior right hepatic lobe compatible with known neoplasm. 2.  Cholecystectomy. No bile duct dilatation.     XR Chest Port 1 View    Result Date: 2/4/2025  EXAM: XR CHEST PORT 1 VIEW LOCATION: [facility] DATE: 2/4/2025 INDICATION: Generalized weakness. Hx of CHF. COMPARISON: Chest radiograph 1/16/2025, CT chest 1/16/2020     IMPRESSION: Right chest port is unchanged with tip near the cavoatrial junction. No findings of edema. Nodular opacity in the right midlung corresponding to the cavitary nodule seen on prior CT is unchanged accounting for differences in technique. No pleural effusion or pneumothorax. Stable heart size and mediastinal contours.    CT Abdomen Pelvis w Contrast    Result Date: 2/4/2025  EXAM: CT ABDOMEN PELVIS W CONTRAST LOCATION: [facility] DATE: 2/4/2025 INDICATION: Weakness, diffuse lower abdominal tenderness COMPARISON: 1/16/2025 TECHNIQUE: CT scan of the abdomen and pelvis was performed following injection of IV contrast. Multiplanar reformats were obtained. Dose reduction techniques were used. CONTRAST: IsoVue 370 75mL FINDINGS: LOWER CHEST: Partially visualized nodule along the right major fissure. Three-vessel coronary artery calcification. HEPATOBILIARY: [Morphologic changes of cirrhosis with ill-defined 3.7 x 3.4 cm hypoenhancing lesion in the right lobe posteriorly (4/36). Cholecystectomy. No biliary dilation. PANCREAS: Normal. SPLEEN:  Enlarged, 16.0 cm craniocaudal. ADRENAL GLANDS: Normal. KIDNEYS/BLADDER: Normal. BOWEL: Diverticulosis of the colon. No acute inflammatory change. No obstruction. Normal appendix. LYMPH NODES: Enlarged periportal lymph node measuring 7.4 x 5.1 cm (4/67), previously 7.3 x 5.2 cm. No other enlarged lymph nodes in the abdomen and pelvis. Some borderline enlarged retroperitoneal lymph nodes are unchanged. VASCULATURE: Severe atherosclerotic calcification of the aorta, visceral branches, and iliofemoral arteries. Right common iliac and left common-external iliac artery stents, which appear to be patent, although the arterial enhancement is suboptimal. No aortic aneurysm. PELVIC ORGANS: Normal. MUSCULOSKELETAL: Multilevel degenerative changes in the spine.     IMPRESSION: 1.  No acute abnormality in the abdomen and pelvis. 2.  Morphologic changes of cirrhosis with splenomegaly. No ascites. 3.  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     Head CT w/o contrast    Result Date: 2/4/2025  EXAM: CT HEAD W/O CONTRAST LOCATION: [facility] DATE: 2/4/2025 INDICATION: Recent fall, confusion COMPARISON: 3/8/2016 brain MRI TECHNIQUE: Routine CT Head without IV contrast. Multiplanar reformats. Dose reduction techniques were used. FINDINGS: INTRACRANIAL CONTENTS: No intracranial hemorrhage, extraaxial collection, or mass effect.  Small chronic right cerebellar infarct. No CT evidence of acute infarct. Mild presumed chronic small vessel ischemic changes. Mild generalized volume loss. No hydrocephalus. VISUALIZED ORBITS/SINUSES/MASTOIDS: No intraorbital abnormality. Minor mucosal thickening right maxillary sinus. No middle ear or mastoid effusion. BONES/SOFT TISSUES: No acute abnormality.     IMPRESSION: 1.  No acute intracranial injury, hemorrhage,  "mass, or CT evidence of recent ischemia.    POC US Guidance Needle Placement    Result Date: 1/27/2025  Ultrasound was performed as guidance to an anesthesia procedure.  Click \"PACS images\" hyperlink below to view any stored images.  For specific procedure details, view procedure note authored by anesthesia.    POC US Guidance Needle Placement    Result Date: 1/27/2025  Ultrasound was performed as guidance to an anesthesia procedure.  Click \"PACS images\" hyperlink below to view any stored images.  For specific procedure details, view procedure note authored by anesthesia.    IR Chest Port Placement > 5 Yrs of Age    Result Date: 1/17/2025  LOCATION: [facility] DATE: 1/16/2025 PROCEDURE: IMPLANTABLE VENOUS ACCESS PORT PLACEMENT, FLUOROSCOPIC GUIDANCE FOR CENTRAL VENOUS ACCESS DEVICE PLACEMENT, BALLOON EXPANDED WITH COVERED STENT PLACEMENT IN THE BILATERAL COMMON ILIAC ARTERIES, BALLOON ANGIOPLASTY AND DRUG-ELUTING STENT PLACEMENT IN THE LEFT EXTERNAL ILIAC ARTERY, ULTRASOUND GUIDANCE FOR VASCULAR ACCESSX3. INTERVENTIONAL RADIOLOGIST: Silvestre Jc MD INDICATION: 76-year-old male with recently diagnosed hepatocellular carcinoma, plan for venous port placement for administration of chemotherapy. The patient is also noted to have bilateral lower extremity wounds/critical limb threatening ischemia, plan for pelvic angiography with possible intervention. INDICATION FOR DIAGNOSTIC ANGIOGRAPHY: Diagnostic angiography was required to precisely identify plaque morphology to aid in evaluation and management CONSENT: The risks, benefits and alternatives of the procedure were discussed with the patient or patient's guardian in detail. All questions were answered. Informed consent was given to proceed with the procedure. MODERATE SEDATION: Versed 3 mg IV and fentanyl 200 mcg IV were administered by the radiology nurse with continuous vital sign monitoring under my direct supervision. During the time out, " immediately prior to the administration of medications, the patient  was reassessed for adequacy to receive conscious sedation. Total moderate sedation time was 135 minutes. CONTRAST: 60 cc FLUOROSCOPIC TIME: 13.8 minutes. RADIATION DOSE: Air Kerma: 2662 mGy. COMPLICATIONS: No immediate complications. UNIVERSAL PROTOCOL: The operative site was marked and any prior imaging was reviewed. Required items including blood products, implants, devices and special equipment was made available. Patient identity was confirmed either verbally, with demographic information, hospital assigned identification or other identification markers. A timeout was performed immediately prior to the procedure. STERILE BARRIER TECHNIQUE: Maximum sterile barrier technique was used. Cutaneous antisepsis was performed at the operative site with application of 2% chlorhexidine and large sterile drape. Prior to the procedure, the  and assistant performed hand hygiene and wore hat, mask, sterile gown, and sterile gloves during the entire procedure. PROCEDURE:  1. RIGHT INTERNAL JUGULAR IMPLANTABLE VENOUS PORT PLACEMENT: Using real-time ultrasound guidance the right internal jugular vein was accessed. Imaging demonstrates a patent compressible vein. Permanent images were stored to the patient's medical record. A subcutaneous pocket was created and irrigated with sterile normal saline. The catheter tubing was tunneled in an antegrade fashion from the port pocket to the dermatotomy site. Over a guidewire, a peel-away sheath was advanced with fluoroscopic monitoring. Through the peel-away sheath, the catheter was advanced until the tip was at the cavoatrial junction. Positioning of the distal tip was confirmed with a radiograph from the in room fluoroscopic unit. The catheter was cut to length and attached firmly to the port. The port pocket incision was closed with layered absorbable suture and surgical glue. The dermatotomy site was closed  with surgical glue. 2. ABDOMINAL AORTOGRAM AND BILATERAL ILIAC STENT PLACEMENT: Access was achieved into the left common femoral artery utilizing real-time ultrasound guidance and micropuncture access kit. Imaging demonstrates a patent and compressible vessel. Permanent images were stored to the patient's medical record. Conversion was made for a 5 Israeli vascular sheath, which was attached to a continuous Argatroban/saline infusion. A small volume of contrast was injected through the left common femoral arterial sheath. Imaging demonstrates complete occlusion of the left common and external iliac arteries. An angled catheter and Glidewire were advanced to the sheath and the occlusion in the left common iliac and external iliac artery were crossed. A small volume of contrast was injected and the caudal abdominal aorta, confirming true lumen crossing. Over-the-wire exchange is made for a marked flush catheter and a pelvic angiogram was obtained. Imaging demonstrates patent caudal abdominal aorta. Patent right common iliac artery with moderate grade stenosis of the ostial vessel. Patent right external iliac artery. Occluded right internal iliac artery. Occluded left common  iliac, internal iliac and external iliac arteries. Reconstituted flow in the left common femoral artery. The segment of occluded vasculature was measured. Over-the-wire exchange is made for a 6 Israeli x 25 cm vascular sheath which was positioned in the caudal abdominal aorta. Systemic anticoagulation was performed with Argatroban and monitored with serial activated clotting times. Access was achieved into the right common femoral artery utilizing real-time ultrasound guidance and micropuncture access kit. Imaging demonstrates a patent and compressible vessel. Permanent images were stored to the patient's medical record. Conversion  was made for a 6 Israeli x 25 cm vascular sheath, which was attached to a continuous Argatroban/saline infusion and  "advanced the caudal abdominal aorta. Utilizing the right common femoral arterial access, over-the-wire exchange is made for a 7 mm x 29 mm Viabahn VBX balloon expandable covered stent which was carefully positioned in the area of stenosis in the right common iliac artery. Utilizing the left  common femoral arterial access, over the wire exchange is made for a 7 mm x 59 mm Viabahn VBX balloon expandable covered stent which was carefully positioned in the area of occlusion in the left common and external iliac arteries. Both stents were simultaneously deployed to nominal pressure in a kissing fashion. Utilizing the left common femoral arterial access, over the wire exchange is made for a 7 mm x 120 mm Jill drug-eluting stent. The stent was positioned in the caudal common iliac artery and extended to the caudal left external iliac artery. The stent was carefully deployed and post stent deployment balloon angioplasty was performed utilizing a 7 mm x 10 cm angioplasty balloon. A repeat pelvic aortogram was performed through the left common femoral arterial sheath. Imaging demonstrates patent bilateral common iliac arteries without significant stenosis. Interval revascularization of the left external iliac artery with flow into the left common femoral artery. There is no significant residual stenosis in the bilateral iliac system. At this point, the procedure was considered complete. The Argatroban infusion was discontinued and hemostasis was achieved utilizing an Angio-Seal closure device in each groin. There is complete hemostasis post procedure.     IMPRESSION:  1. Pelvic angiography demonstrates moderate stenosis of the ostial right common iliac artery and patent right external iliac artery. 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 2. Successful bilateral common iliac artery \"kissing\" stent placement. Successful placement of a drug-eluting stent in the occluded left external iliac artery. Post " intervention angiography demonstrates patent bilateral common iliac and external iliac arteries without significant residual stenosis. 3. Successful power-injectable venous port placement. PLAN: 2 hours bedrest post sheath removal. Continue dual antiplatelet therapy with aspirin and Plavix status post drug-eluting stent placement.     IR Lower Extremity Angiogram Left    Result Date: 1/17/2025  LOCATION: [facility] DATE: 1/16/2025 PROCEDURE: IMPLANTABLE VENOUS ACCESS PORT PLACEMENT, FLUOROSCOPIC GUIDANCE FOR CENTRAL VENOUS ACCESS DEVICE PLACEMENT, BALLOON EXPANDED WITH COVERED STENT PLACEMENT IN THE BILATERAL COMMON ILIAC ARTERIES, BALLOON ANGIOPLASTY AND DRUG-ELUTING STENT PLACEMENT IN THE LEFT EXTERNAL ILIAC ARTERY, ULTRASOUND GUIDANCE FOR VASCULAR ACCESSX3. INTERVENTIONAL RADIOLOGIST: Silvestre Jc MD INDICATION: 76-year-old male with recently diagnosed hepatocellular carcinoma, plan for venous port placement for administration of chemotherapy. The patient is also noted to have bilateral lower extremity wounds/critical limb threatening ischemia, plan for pelvic angiography with possible intervention. INDICATION FOR DIAGNOSTIC ANGIOGRAPHY: Diagnostic angiography was required to precisely identify plaque morphology to aid in evaluation and management CONSENT: The risks, benefits and alternatives of the procedure were discussed with the patient or patient's guardian in detail. All questions were answered. Informed consent was given to proceed with the procedure. MODERATE SEDATION: Versed 3 mg IV and fentanyl 200 mcg IV were administered by the radiology nurse with continuous vital sign monitoring under my direct supervision. During the time out, immediately prior to the administration of medications, the patient  was reassessed for adequacy to receive conscious sedation. Total moderate sedation time was 135 minutes. CONTRAST: 60 cc FLUOROSCOPIC TIME: 13.8 minutes. RADIATION DOSE: Air Kerma:  2662 mGy. COMPLICATIONS: No immediate complications. UNIVERSAL PROTOCOL: The operative site was marked and any prior imaging was reviewed. Required items including blood products, implants, devices and special equipment was made available. Patient identity was confirmed either verbally, with demographic information, hospital assigned identification or other identification markers. A timeout was performed immediately prior to the procedure. STERILE BARRIER TECHNIQUE: Maximum sterile barrier technique was used. Cutaneous antisepsis was performed at the operative site with application of 2% chlorhexidine and large sterile drape. Prior to the procedure, the  and assistant performed hand hygiene and wore hat, mask, sterile gown, and sterile gloves during the entire procedure. PROCEDURE:  1. RIGHT INTERNAL JUGULAR IMPLANTABLE VENOUS PORT PLACEMENT: Using real-time ultrasound guidance the right internal jugular vein was accessed. Imaging demonstrates a patent compressible vein. Permanent images were stored to the patient's medical record. A subcutaneous pocket was created and irrigated with sterile normal saline. The catheter tubing was tunneled in an antegrade fashion from the port pocket to the dermatotomy site. Over a guidewire, a peel-away sheath was advanced with fluoroscopic monitoring. Through the peel-away sheath, the catheter was advanced until the tip was at the cavoatrial junction. Positioning of the distal tip was confirmed with a radiograph from the in room fluoroscopic unit. The catheter was cut to length and attached firmly to the port. The port pocket incision was closed with layered absorbable suture and surgical glue. The dermatotomy site was closed with surgical glue. 2. ABDOMINAL AORTOGRAM AND BILATERAL ILIAC STENT PLACEMENT: Access was achieved into the left common femoral artery utilizing real-time ultrasound guidance and micropuncture access kit. Imaging demonstrates a patent and compressible  vessel. Permanent images were stored to the patient's medical record. Conversion was made for a 5 Iranian vascular sheath, which was attached to a continuous Argatroban/saline infusion. A small volume of contrast was injected through the left common femoral arterial sheath. Imaging demonstrates complete occlusion of the left common and external iliac arteries. An angled catheter and Glidewire were advanced to the sheath and the occlusion in the left common iliac and external iliac artery were crossed. A small volume of contrast was injected and the caudal abdominal aorta, confirming true lumen crossing. Over-the-wire exchange is made for a marked flush catheter and a pelvic angiogram was obtained. Imaging demonstrates patent caudal abdominal aorta. Patent right common iliac artery with moderate grade stenosis of the ostial vessel. Patent right external iliac artery. Occluded right internal iliac artery. Occluded left common  iliac, internal iliac and external iliac arteries. Reconstituted flow in the left common femoral artery. The segment of occluded vasculature was measured. Over-the-wire exchange is made for a 6 Iranian x 25 cm vascular sheath which was positioned in the caudal abdominal aorta. Systemic anticoagulation was performed with Argatroban and monitored with serial activated clotting times. Access was achieved into the right common femoral artery utilizing real-time ultrasound guidance and micropuncture access kit. Imaging demonstrates a patent and compressible vessel. Permanent images were stored to the patient's medical record. Conversion  was made for a 6 Iranian x 25 cm vascular sheath, which was attached to a continuous Argatroban/saline infusion and advanced the caudal abdominal aorta. Utilizing the right common femoral arterial access, over-the-wire exchange is made for a 7 mm x 29 mm Viabahn VBX balloon expandable covered stent which was carefully positioned in the area of stenosis in the right  "common iliac artery. Utilizing the left  common femoral arterial access, over the wire exchange is made for a 7 mm x 59 mm Viabahn VBX balloon expandable covered stent which was carefully positioned in the area of occlusion in the left common and external iliac arteries. Both stents were simultaneously deployed to nominal pressure in a kissing fashion. Utilizing the left common femoral arterial access, over the wire exchange is made for a 7 mm x 120 mm Jill drug-eluting stent. The stent was positioned in the caudal common iliac artery and extended to the caudal left external iliac artery. The stent was carefully deployed and post stent deployment balloon angioplasty was performed utilizing a 7 mm x 10 cm angioplasty balloon. A repeat pelvic aortogram was performed through the left common femoral arterial sheath. Imaging demonstrates patent bilateral common iliac arteries without significant stenosis. Interval revascularization of the left external iliac artery with flow into the left common femoral artery. There is no significant residual stenosis in the bilateral iliac system. At this point, the procedure was considered complete. The Argatroban infusion was discontinued and hemostasis was achieved utilizing an Angio-Seal closure device in each groin. There is complete hemostasis post procedure.     IMPRESSION:  1. Pelvic angiography demonstrates moderate stenosis of the ostial right common iliac artery and patent right external iliac artery. 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 2. Successful bilateral common iliac artery \"kissing\" stent placement. Successful placement of a drug-eluting stent in the occluded left external iliac artery. Post intervention angiography demonstrates patent bilateral common iliac and external iliac arteries without significant residual stenosis. 3. Successful power-injectable venous port placement. PLAN: 2 hours bedrest post sheath removal. Continue dual " antiplatelet therapy with aspirin and Plavix status post drug-eluting stent placement.     Chest CT w/o contrast    Result Date: 1/16/2025  EXAM: CT CHEST W/O CONTRAST LOCATION: [facility] DATE: 1/16/2025 INDICATION: Possible cavitary lesion on chest x-ray. COMPARISON: Chest radiograph performed earlier today. Chest CT 12/11/2024. TECHNIQUE: CT chest without IV contrast. Multiplanar reformats were obtained. Dose reduction techniques were used. CONTRAST: None. FINDINGS: LUNGS AND PLEURA: Moderate upper lung predominant emphysematous changes in both lungs. Cavitary nodule in the right middle lobe abutting the right major and minor fissures measures 3.3 x 1.6 cm. This finding was obscured by consolidation on the previous exam. Subpleural 0.4 cm nodule in the left lower lobe (series 4 image 209), also obscured by consolidation on the previous exam. Small right upper lobe calcified granuloma. Mild fibrotic changes about the periphery of both lung bases. No pleural effusions. MEDIASTINUM/AXILLAE: Right Port-A-Cath, with tip in the upper right atrium. No pericardial effusion. No enlarged lymph nodes in the chest. Severe atherosclerotic aortoiliac calcification. CORONARY ARTERY CALCIFICATION: Severe. UPPER ABDOMEN: Cholecystectomy. Bulky adenopathy in the periportal region is partially included on this exam, but appears similar to previous. The spleen is partially included on this study, but again appears mildly enlarged. Nodularity of the liver contour suggests underlying cirrhosis. Previously described right hepatic lobe mass is not well defined on this noncontrast exam. MUSCULOSKELETAL: Degenerative changes in the thoracic spine.     IMPRESSION: 1.  Cavitary nodule in the right middle lobe abutting the right major and minor fissures measures 3.3 x 1.6 cm. This finding was obscured by consolidation on the previous exam. This finding is suspicious for malignancy. 2.  Subpleural 0.4 cm nodule in the  left lower lobe, also obscured on the previous exam. 3.  Moderate upper lung predominant emphysematous changes in both lungs. 4.  Bulky adenopathy in the periportal region of the upper abdomen is partially included on this exam, but appears similar to previous, and is again suspicious for malignancy.    Chest XR,  PA & LAT    Result Date: 1/16/2025  EXAM: XR CHEST 2 VIEWS LOCATION: [facility] DATE: 1/16/2025 INDICATION: Syncope. COMPARISON: 7/22/2014; CT chest 12/11/2014.     IMPRESSION: No acute airspace consolidation. Scattered nodular opacities of the right hemithorax, likely pleural/fissural based, when correlation is made to previous CT imaging. Possible cavitary nodular opacity at the left suprahilar region, incompletely imaged and evaluated. Dedicated chest CT imaging is recommended. No pleural effusion or pneumothorax. Upper limits of normal heart size. Atherosclerotic calcifications of the thoracoabdominal aorta. Right chest wall port catheter has tip overlying the distal SVC.    CT Abdomen Pelvis w/o Contrast    Result Date: 1/16/2025  EXAM: CT ABDOMEN PELVIS W/O CONTRAST LOCATION: [facility] DATE: 1/16/2025 INDICATION: Chills after angiogram, right groin pain, ? RP bleed. COMPARISON: 12/6/2024. TECHNIQUE: CT scan of the abdomen and pelvis was performed without IV contrast. Multiplanar reformats were obtained. Dose reduction techniques were used. CONTRAST: None. FINDINGS: LOWER CHEST: Incompletely visualized subpleural 2 cm mass in the right middle lobe with bibasilar atelectasis and a 3 mm subpleural nodule left lower lobe. HEPATOBILIARY: Hypodense 4.4 cm subcapsular mass in the right hepatic lobe difficult to visualize without the benefit of IV contrast material. PANCREAS: Normal. SPLEEN: Normal. ADRENAL GLANDS: Normal. KIDNEYS/BLADDER: Normal. BOWEL: Normal. LYMPH NODES: Bulky inhomogeneous mass or lymphadenopathy in the upper retroperitoneum in the  duodenum and anterior to the IVC measuring 10.0 x 7.3 x 5.1 cm. Additional subcentimeter periaortic nodes. VASCULATURE: No retroperitoneal hemorrhage. PELVIC ORGANS: Normal. MUSCULOSKELETAL: Normal.     IMPRESSION: 1.  No retroperitoneal hemorrhage. 2.  Ill-defined hypodense mass in the right hepatic lobe with bulky upper right retroperitoneal mass.

## 2025-02-10 LAB
HCT VFR BLD CALC: 37.4 % — SIGNIFICANT CHANGE UP (ref 34.5–45)
HGB BLD-MCNC: 11.5 G/DL — SIGNIFICANT CHANGE UP (ref 11.5–15.5)
MCHC RBC-ENTMCNC: 28.8 PG — SIGNIFICANT CHANGE UP (ref 27–34)
MCHC RBC-ENTMCNC: 30.7 G/DL — LOW (ref 32–36)
MCV RBC AUTO: 93.7 FL — SIGNIFICANT CHANGE UP (ref 80–100)
NRBC # BLD: 0 /100 WBCS — SIGNIFICANT CHANGE UP (ref 0–0)
NRBC BLD-RTO: 0 /100 WBCS — SIGNIFICANT CHANGE UP (ref 0–0)
PLATELET # BLD AUTO: 173 K/UL — SIGNIFICANT CHANGE UP (ref 150–400)
RBC # BLD: 3.99 M/UL — SIGNIFICANT CHANGE UP (ref 3.8–5.2)
RBC # FLD: 14 % — SIGNIFICANT CHANGE UP (ref 10.3–14.5)
WBC # BLD: 9.69 K/UL — SIGNIFICANT CHANGE UP (ref 3.8–10.5)
WBC # FLD AUTO: 9.69 K/UL — SIGNIFICANT CHANGE UP (ref 3.8–10.5)

## 2025-02-10 RX ADMIN — GABAPENTIN 100 MILLIGRAM(S): 800 TABLET ORAL at 13:50

## 2025-02-10 RX ADMIN — ACETAMINOPHEN, DIPHENHYDRAMINE HCL, PHENYLEPHRINE HCL 3 MILLIGRAM(S): 325; 25; 5 TABLET ORAL at 22:28

## 2025-02-10 RX ADMIN — Medication 1 TABLET(S): at 13:49

## 2025-02-10 RX ADMIN — APIXABAN 5 MILLIGRAM(S): 5 TABLET, FILM COATED ORAL at 06:05

## 2025-02-10 RX ADMIN — Medication 75 MILLIGRAM(S): at 18:44

## 2025-02-10 RX ADMIN — MONTELUKAST SODIUM 10 MILLIGRAM(S): 5 TABLET, CHEWABLE ORAL at 13:50

## 2025-02-10 RX ADMIN — ATORVASTATIN CALCIUM 40 MILLIGRAM(S): 80 TABLET, FILM COATED ORAL at 22:28

## 2025-02-10 RX ADMIN — Medication 1 APPLICATION(S): at 06:06

## 2025-02-10 RX ADMIN — Medication 1 APPLICATION(S): at 06:05

## 2025-02-10 RX ADMIN — Medication 1 APPLICATION(S): at 18:42

## 2025-02-10 RX ADMIN — DULOXETINE 60 MILLIGRAM(S): 20 CAPSULE, DELAYED RELEASE ORAL at 13:50

## 2025-02-10 RX ADMIN — SACUBITRIL AND VALSARTAN 1 TABLET(S): 49; 51 TABLET, FILM COATED ORAL at 06:03

## 2025-02-10 RX ADMIN — BUDESONIDE 0.5 MILLIGRAM(S): 9 TABLET, EXTENDED RELEASE ORAL at 06:05

## 2025-02-10 RX ADMIN — Medication 25 MILLIGRAM(S): at 18:43

## 2025-02-10 RX ADMIN — GABAPENTIN 100 MILLIGRAM(S): 800 TABLET ORAL at 22:28

## 2025-02-10 RX ADMIN — GABAPENTIN 100 MILLIGRAM(S): 800 TABLET ORAL at 06:03

## 2025-02-10 RX ADMIN — LEVOTHYROXINE SODIUM 125 MICROGRAM(S): 25 TABLET ORAL at 06:03

## 2025-02-10 RX ADMIN — APIXABAN 5 MILLIGRAM(S): 5 TABLET, FILM COATED ORAL at 18:44

## 2025-02-10 RX ADMIN — TIOTROPIUM BROMIDE MONOHYDRATE 2 PUFF(S): 18 CAPSULE ORAL; RESPIRATORY (INHALATION) at 13:51

## 2025-02-10 RX ADMIN — BUDESONIDE 0.5 MILLIGRAM(S): 9 TABLET, EXTENDED RELEASE ORAL at 18:43

## 2025-02-10 RX ADMIN — TRAMADOL HYDROCHLORIDE 50 MILLIGRAM(S): 100 TABLET, EXTENDED RELEASE ORAL at 06:30

## 2025-02-10 RX ADMIN — BUSPIRONE HYDROCHLORIDE 10 MILLIGRAM(S): 5 TABLET ORAL at 13:52

## 2025-02-10 RX ADMIN — SACUBITRIL AND VALSARTAN 1 TABLET(S): 49; 51 TABLET, FILM COATED ORAL at 18:43

## 2025-02-10 RX ADMIN — Medication 1 APPLICATION(S): at 18:44

## 2025-02-10 RX ADMIN — Medication 75 MILLIGRAM(S): at 06:03

## 2025-02-10 RX ADMIN — TRAMADOL HYDROCHLORIDE 50 MILLIGRAM(S): 100 TABLET, EXTENDED RELEASE ORAL at 06:03

## 2025-02-10 RX ADMIN — PANTOPRAZOLE 40 MILLIGRAM(S): 20 TABLET, DELAYED RELEASE ORAL at 13:50

## 2025-02-10 RX ADMIN — Medication 5 MILLIGRAM(S): at 06:03

## 2025-02-10 NOTE — ED ADULT NURSE NOTE - CAPILLARY REFILL
Patient last seen by Dr. Price in December 2024.  Consultation changed.    Kevin Asencio MD  2/10/2025       2 seconds or less

## 2025-02-11 RX ORDER — SENNOSIDES 8.6 MG
2 TABLET ORAL ONCE
Refills: 0 | Status: COMPLETED | OUTPATIENT
Start: 2025-02-11 | End: 2025-02-11

## 2025-02-11 RX ORDER — GLYCERIN ADULT
1 SUPPOSITORY, RECTAL RECTAL ONCE
Refills: 0 | Status: COMPLETED | OUTPATIENT
Start: 2025-02-11 | End: 2025-02-11

## 2025-02-11 RX ORDER — MAGNESIUM HYDROXIDE 400 MG/5ML
30 SUSPENSION, ORAL (FINAL DOSE FORM) ORAL ONCE
Refills: 0 | Status: DISCONTINUED | OUTPATIENT
Start: 2025-02-11 | End: 2025-02-13

## 2025-02-11 RX ADMIN — BUSPIRONE HYDROCHLORIDE 10 MILLIGRAM(S): 5 TABLET ORAL at 11:34

## 2025-02-11 RX ADMIN — SACUBITRIL AND VALSARTAN 1 TABLET(S): 49; 51 TABLET, FILM COATED ORAL at 17:02

## 2025-02-11 RX ADMIN — GABAPENTIN 100 MILLIGRAM(S): 800 TABLET ORAL at 06:02

## 2025-02-11 RX ADMIN — GABAPENTIN 100 MILLIGRAM(S): 800 TABLET ORAL at 23:11

## 2025-02-11 RX ADMIN — Medication 75 MILLIGRAM(S): at 17:02

## 2025-02-11 RX ADMIN — Medication 1 APPLICATION(S): at 06:02

## 2025-02-11 RX ADMIN — LEVOTHYROXINE SODIUM 125 MICROGRAM(S): 25 TABLET ORAL at 05:59

## 2025-02-11 RX ADMIN — TRAMADOL HYDROCHLORIDE 50 MILLIGRAM(S): 100 TABLET, EXTENDED RELEASE ORAL at 11:35

## 2025-02-11 RX ADMIN — BUDESONIDE 0.5 MILLIGRAM(S): 9 TABLET, EXTENDED RELEASE ORAL at 05:59

## 2025-02-11 RX ADMIN — TRAMADOL HYDROCHLORIDE 50 MILLIGRAM(S): 100 TABLET, EXTENDED RELEASE ORAL at 12:35

## 2025-02-11 RX ADMIN — Medication 1 APPLICATION(S): at 17:03

## 2025-02-11 RX ADMIN — BUDESONIDE 0.5 MILLIGRAM(S): 9 TABLET, EXTENDED RELEASE ORAL at 17:03

## 2025-02-11 RX ADMIN — PANTOPRAZOLE 40 MILLIGRAM(S): 20 TABLET, DELAYED RELEASE ORAL at 11:33

## 2025-02-11 RX ADMIN — MONTELUKAST SODIUM 10 MILLIGRAM(S): 5 TABLET, CHEWABLE ORAL at 11:34

## 2025-02-11 RX ADMIN — Medication 5 MILLIGRAM(S): at 05:58

## 2025-02-11 RX ADMIN — APIXABAN 5 MILLIGRAM(S): 5 TABLET, FILM COATED ORAL at 05:58

## 2025-02-11 RX ADMIN — Medication 2 TABLET(S): at 07:35

## 2025-02-11 RX ADMIN — ATORVASTATIN CALCIUM 40 MILLIGRAM(S): 80 TABLET, FILM COATED ORAL at 23:56

## 2025-02-11 RX ADMIN — Medication 25 MILLIGRAM(S): at 05:59

## 2025-02-11 RX ADMIN — Medication 1 TABLET(S): at 11:33

## 2025-02-11 RX ADMIN — SACUBITRIL AND VALSARTAN 1 TABLET(S): 49; 51 TABLET, FILM COATED ORAL at 05:58

## 2025-02-11 RX ADMIN — Medication 1 SUPPOSITORY(S): at 07:35

## 2025-02-11 RX ADMIN — Medication 25 MILLIGRAM(S): at 17:01

## 2025-02-11 RX ADMIN — APIXABAN 5 MILLIGRAM(S): 5 TABLET, FILM COATED ORAL at 17:02

## 2025-02-11 RX ADMIN — Medication 75 MILLIGRAM(S): at 05:58

## 2025-02-11 RX ADMIN — GABAPENTIN 100 MILLIGRAM(S): 800 TABLET ORAL at 13:07

## 2025-02-11 RX ADMIN — DULOXETINE 60 MILLIGRAM(S): 20 CAPSULE, DELAYED RELEASE ORAL at 11:34

## 2025-02-11 RX ADMIN — Medication 1 APPLICATION(S): at 17:02

## 2025-02-11 RX ADMIN — TIOTROPIUM BROMIDE MONOHYDRATE 2 PUFF(S): 18 CAPSULE ORAL; RESPIRATORY (INHALATION) at 11:33

## 2025-02-12 LAB
ANION GAP SERPL CALC-SCNC: 12 MMOL/L — SIGNIFICANT CHANGE UP (ref 5–17)
BUN SERPL-MCNC: 19 MG/DL — SIGNIFICANT CHANGE UP (ref 7–23)
CALCIUM SERPL-MCNC: 10.2 MG/DL — SIGNIFICANT CHANGE UP (ref 8.4–10.5)
CHLORIDE SERPL-SCNC: 103 MMOL/L — SIGNIFICANT CHANGE UP (ref 96–108)
CO2 SERPL-SCNC: 25 MMOL/L — SIGNIFICANT CHANGE UP (ref 22–31)
CREAT SERPL-MCNC: 0.6 MG/DL — SIGNIFICANT CHANGE UP (ref 0.5–1.3)
EGFR: 93 ML/MIN/1.73M2 — SIGNIFICANT CHANGE UP
GLUCOSE SERPL-MCNC: 88 MG/DL — SIGNIFICANT CHANGE UP (ref 70–99)
HCT VFR BLD CALC: 37.3 % — SIGNIFICANT CHANGE UP (ref 34.5–45)
HGB BLD-MCNC: 11.7 G/DL — SIGNIFICANT CHANGE UP (ref 11.5–15.5)
MCHC RBC-ENTMCNC: 29.5 PG — SIGNIFICANT CHANGE UP (ref 27–34)
MCHC RBC-ENTMCNC: 31.4 G/DL — LOW (ref 32–36)
MCV RBC AUTO: 94.2 FL — SIGNIFICANT CHANGE UP (ref 80–100)
NRBC BLD AUTO-RTO: 0 /100 WBCS — SIGNIFICANT CHANGE UP (ref 0–0)
PLATELET # BLD AUTO: 160 K/UL — SIGNIFICANT CHANGE UP (ref 150–400)
POTASSIUM SERPL-MCNC: 4.2 MMOL/L — SIGNIFICANT CHANGE UP (ref 3.5–5.3)
POTASSIUM SERPL-SCNC: 4.2 MMOL/L — SIGNIFICANT CHANGE UP (ref 3.5–5.3)
RBC # BLD: 3.96 M/UL — SIGNIFICANT CHANGE UP (ref 3.8–5.2)
RBC # FLD: 14 % — SIGNIFICANT CHANGE UP (ref 10.3–14.5)
SODIUM SERPL-SCNC: 140 MMOL/L — SIGNIFICANT CHANGE UP (ref 135–145)
WBC # BLD: 7.93 K/UL — SIGNIFICANT CHANGE UP (ref 3.8–10.5)
WBC # FLD AUTO: 7.93 K/UL — SIGNIFICANT CHANGE UP (ref 3.8–10.5)

## 2025-02-12 RX ADMIN — Medication 1 TABLET(S): at 12:28

## 2025-02-12 RX ADMIN — GABAPENTIN 100 MILLIGRAM(S): 800 TABLET ORAL at 05:38

## 2025-02-12 RX ADMIN — Medication 75 MILLIGRAM(S): at 05:39

## 2025-02-12 RX ADMIN — Medication 1 APPLICATION(S): at 18:10

## 2025-02-12 RX ADMIN — BUDESONIDE 0.5 MILLIGRAM(S): 9 TABLET, EXTENDED RELEASE ORAL at 18:04

## 2025-02-12 RX ADMIN — SACUBITRIL AND VALSARTAN 1 TABLET(S): 49; 51 TABLET, FILM COATED ORAL at 05:38

## 2025-02-12 RX ADMIN — MONTELUKAST SODIUM 10 MILLIGRAM(S): 5 TABLET, CHEWABLE ORAL at 12:27

## 2025-02-12 RX ADMIN — APIXABAN 5 MILLIGRAM(S): 5 TABLET, FILM COATED ORAL at 18:04

## 2025-02-12 RX ADMIN — BUSPIRONE HYDROCHLORIDE 10 MILLIGRAM(S): 5 TABLET ORAL at 14:13

## 2025-02-12 RX ADMIN — GABAPENTIN 100 MILLIGRAM(S): 800 TABLET ORAL at 14:11

## 2025-02-12 RX ADMIN — Medication 1 APPLICATION(S): at 05:44

## 2025-02-12 RX ADMIN — TRAMADOL HYDROCHLORIDE 50 MILLIGRAM(S): 100 TABLET, EXTENDED RELEASE ORAL at 06:25

## 2025-02-12 RX ADMIN — DULOXETINE 60 MILLIGRAM(S): 20 CAPSULE, DELAYED RELEASE ORAL at 12:28

## 2025-02-12 RX ADMIN — TIOTROPIUM BROMIDE MONOHYDRATE 2 PUFF(S): 18 CAPSULE ORAL; RESPIRATORY (INHALATION) at 12:29

## 2025-02-12 RX ADMIN — Medication 5 MILLIGRAM(S): at 05:38

## 2025-02-12 RX ADMIN — BUDESONIDE 0.5 MILLIGRAM(S): 9 TABLET, EXTENDED RELEASE ORAL at 05:39

## 2025-02-12 RX ADMIN — ACETAMINOPHEN, DIPHENHYDRAMINE HCL, PHENYLEPHRINE HCL 3 MILLIGRAM(S): 325; 25; 5 TABLET ORAL at 21:31

## 2025-02-12 RX ADMIN — ATORVASTATIN CALCIUM 40 MILLIGRAM(S): 80 TABLET, FILM COATED ORAL at 21:31

## 2025-02-12 RX ADMIN — PANTOPRAZOLE 40 MILLIGRAM(S): 20 TABLET, DELAYED RELEASE ORAL at 12:29

## 2025-02-12 RX ADMIN — Medication 25 MILLIGRAM(S): at 05:38

## 2025-02-12 RX ADMIN — LEVOTHYROXINE SODIUM 125 MICROGRAM(S): 25 TABLET ORAL at 05:38

## 2025-02-12 RX ADMIN — GABAPENTIN 100 MILLIGRAM(S): 800 TABLET ORAL at 21:31

## 2025-02-12 RX ADMIN — SACUBITRIL AND VALSARTAN 1 TABLET(S): 49; 51 TABLET, FILM COATED ORAL at 18:04

## 2025-02-12 RX ADMIN — APIXABAN 5 MILLIGRAM(S): 5 TABLET, FILM COATED ORAL at 05:37

## 2025-02-12 RX ADMIN — Medication 1 APPLICATION(S): at 18:11

## 2025-02-12 NOTE — ADVANCED PRACTICE NURSE CONSULT - RECOMMEDATIONS
Impression:    B/L heel hyperpigmentation, cannot rule out a deep tissue injury present on admission - not assessed 2/12/24  B/L buttocks/sacral deep tissue injury present on admission - not assessed 2/12/24  B/L ischium deep tissue injury present on admission - not assessed 2/12/24  right lateral calf wound present on admission  urinary incontinence  fecal incontinence  incontinence associated dermatitis       Recommendations:    1) turn and position q2 and PRN utilizing offloading assistive devices  2) routine pericare daily and PRN soiling  3) encourage optimal nutrition  4) waffle cushion when oob to chair  5) B/L LE complete cair air fluidized boots or andrea-lock pillow to offload heels/feet  6) triad protective barrier cream to B/L buttocks/sacrum daily and PRN soiling  7) incontinence management - consider external urinary catheter to divert urine from skin if incontinent  8) sween 24 moisturizer to dry skin daily   9) right lateral calf wound - cleanse skin and pat dry then apply cavilon to skin, cut and size a single layer of aquacel to open wound base and cover with allevyn foam dressing, change DAILY and/or PRN soiling   10) consider dermatology for RLE wound and patient reported basal cell cancer on nose    Plan discussed with ADDI Dahl on unit      For questions/comments regarding the recommendations in this consult, please contact Sandy Giron via [employer] Teams. Wound care will not actively follow. For new concerns, please enter new consult. Thank you!    
Impression:    B/L heel hyperpigmentation, cannot rule out a deep tissue injury present on admission  B/L buttocks/sacral deep tissue injury present on admission  B/L ischium deep tissue injury present on admission  right lateral calf wound present on admission   urinary incontinence  fecal incontinence  incontinence associated dermatitis       Recommendations:    1) turn and position q2 and PRN utilizing offloading assistive devices  2) routine pericare daily and PRN soiling  3) encourage optimal nutrition  4) waffle cushion when oob to chair  5) B/L LE complete cair air fluidized boots or andrea-lock pillow to offload heels/feet  6) triad protective barrier cream to B/L buttocks/sacrum daily and PRN soiling  7) incontinence management - consider external urinary catheter to divert urine from skin if incontinent  8) sween 24 moisturizer to dry skin daily   9) right lateral calf wound - cleanse skin and pat dry then apply cavilon to skin and cover with allevyn foam dressing, change daily and/or PRN soiling     Plan discussed with ADDI Collier on unit      For questions/comments regarding the recommendations in this consult, please contact Sandy Giron via [employer] Teams. Wound care will not actively follow. For new concerns, please enter new consult. Thank you!

## 2025-02-12 NOTE — ADVANCED PRACTICE NURSE CONSULT - REASON FOR CONSULT
Wound care consult initiated by RN to assess patient's skin for a possible skin injury on sacrum, buttocks, ischium, left posterior thigh, present on admission     Reason for Admission: poor social / living situation, vaginal bleed, pain  History of Present Illness:   patient is a 74  year old woman, AAOX3, from home, wheelchair-bound and uses cane occasionally , w/ PMHx COPD on 3 L NC at home, Obesity, Hypothyroidism, Diastolic HF previously on Lasix, Afib on Eliquis, Breast cancer s/p surgery, Asthma, Chronic lymphedema, Kidney cancer s/p partial nephrectomy, anxiety, presenting to the ER  with blood in diaper when changed by HHA. appeared as dark red blood in diaper x 3 today. appears to be coming from vaginal area, not rectum. states increased urination yesterday. also reports chest pain started yesterday. nonradiating ( upon further questioning patient reports pain all over the body).  pelvic exam done by ER doctor limited exaxm, vaginal vault visualized in speculum, ?scant blood > CT scan ordered, pending  Upon further discussed with pt's son who is at bedside.. patient lives at home with , who has been gradually becoming more forgetful and falling a lot himself, and unable to care for himself or for he patient ( as the primary care taker for many years)...   pt's son Alexander offered fot them both to move in with him but they refused and patient and  have also been refusing to go to see their doctor and kept cancelling their appointments..  at this pont patient and son in agreement that patient and  ( also being admitted) would need to be placed together into a long term care center..     
Wound care consult initiated by RN to assess patient's skin for a wound on right shin     Reason for Admission: poor social / living situation, vaginal bleed, pain  History of Present Illness:   patient is a 74  year old woman, AAOX3, from home, wheelchair-bound and uses cane occasionally , w/ PMHx COPD on 3 L NC at home, Obesity, Hypothyroidism, Diastolic HF previously on Lasix, Afib on Eliquis, Breast cancer s/p surgery, Asthma, Chronic lymphedema, Kidney cancer s/p partial nephrectomy, anxiety, presenting to the ER  with blood in diaper when changed by HHA. appeared as dark red blood in diaper x 3 today. appears to be coming from vaginal area, not rectum. states increased urination yesterday. also reports chest pain started yesterday. nonradiating ( upon further questioning patient reports pain all over the body).  pelvic exam done by ER doctor limited exaxm, vaginal vault visualized in speculum, ?scant blood > CT scan ordered, pending  Upon further discussed with pt's son who is at bedside.. patient lives at home with , who has been gradually becoming more forgetful and falling a lot himself, and unable to care for himself or for he patient ( as the primary care taker for many years)...   pt's son Alexander offered fot them both to move in with him but they refused and patient and  have also been refusing to go to see their doctor and kept cancelling their appointments..  at this pont patient and son in agreement that patient and  ( also being admitted) would need to be placed together into a long term care center..

## 2025-02-12 NOTE — ADVANCED PRACTICE NURSE CONSULT - ASSESSMENT
Patient encountered on 4 DSU. When wound care RN arrived on unit, patient was found lying in a low air loss pressure redistribution support surface style bed. Patient was alert and oriented and gave consent to skin consult. This patient is unable to turn independently and staff assistance x 1 was provided. Once turned, urinary incontinence noted and perineal care was provided. The wound care RN was able to visualize an area of persistent nonblanchable purple hued discoloration over B/L buttocks/sacral skin extending to B/L ischium with scattered areas of superficial partial thickness skin loss - total area measures approximately 30cm x 30cm x 0.1cm- presentation is consistent with a deep tissue injury in evolution with incontinence involvement, present on admission. Right lateral calf wound measures approximately 5cm x 5cm x 0.1cm with areas of hypopigmentation. Patient reports that she has had this wound "for a while and it closes and opens again." Currently, wound measures approximately 3cm x 3cm x 0.1cm, beefy red wound bed, minimal drainage. Once consult was complete, patient was educated regarding the need for routine turning and positioning to prevent pressure injuries and patient was placed in an upright position as she states that she cannot lay flat for too long because she has difficulty breathing. 
Patient encountered on 5ACE. When wound care RN arrived on unit, patient was found lying in a low air loss pressure redistribution support surface style bed. Patient was alert and oriented and gave consent to skin consult. Right lateral calf wound measures approximately 6cm x 4cm x 0.1cm with areas of hypopigmentation. Patient reported last visit that she has had this wound "for a while and it closes and opens again." Currently, wound measures approximately beefy red wound bed, serosanguinous drainage when cleaned. Patient report that she has basal cell carcinoma on nose and missed appointment with dermatologist due to hospitalization- dermatology recommended. Once consult was complete, patient was educated regarding the need for routine turning and positioning to prevent pressure injuries.

## 2025-02-13 ENCOUNTER — TRANSCRIPTION ENCOUNTER (OUTPATIENT)
Age: 75
End: 2025-02-13

## 2025-02-13 VITALS
OXYGEN SATURATION: 95 % | DIASTOLIC BLOOD PRESSURE: 52 MMHG | RESPIRATION RATE: 18 BRPM | SYSTOLIC BLOOD PRESSURE: 124 MMHG | TEMPERATURE: 98 F | HEART RATE: 69 BPM

## 2025-02-13 LAB
ANION GAP SERPL CALC-SCNC: 10 MMOL/L — SIGNIFICANT CHANGE UP (ref 5–17)
BUN SERPL-MCNC: 15 MG/DL — SIGNIFICANT CHANGE UP (ref 7–23)
CALCIUM SERPL-MCNC: 10.3 MG/DL — SIGNIFICANT CHANGE UP (ref 8.4–10.5)
CHLORIDE SERPL-SCNC: 104 MMOL/L — SIGNIFICANT CHANGE UP (ref 96–108)
CO2 SERPL-SCNC: 27 MMOL/L — SIGNIFICANT CHANGE UP (ref 22–31)
CREAT SERPL-MCNC: 0.53 MG/DL — SIGNIFICANT CHANGE UP (ref 0.5–1.3)
EGFR: 96 ML/MIN/1.73M2 — SIGNIFICANT CHANGE UP
GLUCOSE SERPL-MCNC: 94 MG/DL — SIGNIFICANT CHANGE UP (ref 70–99)
POTASSIUM SERPL-MCNC: 3.9 MMOL/L — SIGNIFICANT CHANGE UP (ref 3.5–5.3)
POTASSIUM SERPL-SCNC: 3.9 MMOL/L — SIGNIFICANT CHANGE UP (ref 3.5–5.3)
SODIUM SERPL-SCNC: 141 MMOL/L — SIGNIFICANT CHANGE UP (ref 135–145)
TROPONIN T, HIGH SENSITIVITY RESULT: 18 NG/L — SIGNIFICANT CHANGE UP (ref 0–51)

## 2025-02-13 PROCEDURE — 97530 THERAPEUTIC ACTIVITIES: CPT

## 2025-02-13 PROCEDURE — 80053 COMPREHEN METABOLIC PANEL: CPT

## 2025-02-13 PROCEDURE — 87086 URINE CULTURE/COLONY COUNT: CPT

## 2025-02-13 PROCEDURE — 80048 BASIC METABOLIC PNL TOTAL CA: CPT

## 2025-02-13 PROCEDURE — 86850 RBC ANTIBODY SCREEN: CPT

## 2025-02-13 PROCEDURE — 87186 SC STD MICRODIL/AGAR DIL: CPT

## 2025-02-13 PROCEDURE — 87077 CULTURE AEROBIC IDENTIFY: CPT

## 2025-02-13 PROCEDURE — 86901 BLOOD TYPING SEROLOGIC RH(D): CPT

## 2025-02-13 PROCEDURE — 85027 COMPLETE CBC AUTOMATED: CPT

## 2025-02-13 PROCEDURE — 97116 GAIT TRAINING THERAPY: CPT

## 2025-02-13 PROCEDURE — 86304 IMMUNOASSAY TUMOR CA 125: CPT

## 2025-02-13 PROCEDURE — 83880 ASSAY OF NATRIURETIC PEPTIDE: CPT

## 2025-02-13 PROCEDURE — 81001 URINALYSIS AUTO W/SCOPE: CPT

## 2025-02-13 PROCEDURE — 94640 AIRWAY INHALATION TREATMENT: CPT

## 2025-02-13 PROCEDURE — 85610 PROTHROMBIN TIME: CPT

## 2025-02-13 PROCEDURE — 85025 COMPLETE CBC W/AUTO DIFF WBC: CPT

## 2025-02-13 PROCEDURE — 84484 ASSAY OF TROPONIN QUANT: CPT

## 2025-02-13 PROCEDURE — 86900 BLOOD TYPING SEROLOGIC ABO: CPT

## 2025-02-13 PROCEDURE — 76856 US EXAM PELVIC COMPLETE: CPT

## 2025-02-13 PROCEDURE — 93005 ELECTROCARDIOGRAM TRACING: CPT

## 2025-02-13 PROCEDURE — 71045 X-RAY EXAM CHEST 1 VIEW: CPT

## 2025-02-13 PROCEDURE — A9585: CPT

## 2025-02-13 PROCEDURE — 74177 CT ABD & PELVIS W/CONTRAST: CPT | Mod: MC

## 2025-02-13 PROCEDURE — 80061 LIPID PANEL: CPT

## 2025-02-13 PROCEDURE — 85730 THROMBOPLASTIN TIME PARTIAL: CPT

## 2025-02-13 PROCEDURE — 72197 MRI PELVIS W/O & W/DYE: CPT | Mod: MC

## 2025-02-13 PROCEDURE — 97161 PT EVAL LOW COMPLEX 20 MIN: CPT

## 2025-02-13 PROCEDURE — 83036 HEMOGLOBIN GLYCOSYLATED A1C: CPT

## 2025-02-13 PROCEDURE — 36415 COLL VENOUS BLD VENIPUNCTURE: CPT

## 2025-02-13 PROCEDURE — 99285 EMERGENCY DEPT VISIT HI MDM: CPT

## 2025-02-13 PROCEDURE — 97110 THERAPEUTIC EXERCISES: CPT

## 2025-02-13 RX ORDER — ATORVASTATIN CALCIUM 80 MG/1
1 TABLET, FILM COATED ORAL
Qty: 0 | Refills: 0 | DISCHARGE
Start: 2025-02-13

## 2025-02-13 RX ORDER — ACETAMINOPHEN 160 MG/5ML
1000 SUSPENSION ORAL ONCE
Refills: 0 | Status: COMPLETED | OUTPATIENT
Start: 2025-02-13 | End: 2025-02-13

## 2025-02-13 RX ORDER — SACUBITRIL AND VALSARTAN 49; 51 MG/1; MG/1
1 TABLET, FILM COATED ORAL
Qty: 0 | Refills: 0 | DISCHARGE
Start: 2025-02-13

## 2025-02-13 RX ORDER — APIXABAN 5 MG/1
1 TABLET, FILM COATED ORAL
Qty: 0 | Refills: 0 | DISCHARGE
Start: 2025-02-13

## 2025-02-13 RX ORDER — LEVOTHYROXINE SODIUM 25 UG/1
1 TABLET ORAL
Qty: 0 | Refills: 0 | DISCHARGE
Start: 2025-02-13

## 2025-02-13 RX ORDER — AMLODIPINE BESYLATE 5 MG
1 TABLET ORAL
Qty: 0 | Refills: 0 | DISCHARGE
Start: 2025-02-13

## 2025-02-13 RX ORDER — TIOTROPIUM BROMIDE MONOHYDRATE 18 UG/1
2 CAPSULE ORAL; RESPIRATORY (INHALATION)
Qty: 0 | Refills: 0 | DISCHARGE
Start: 2025-02-13

## 2025-02-13 RX ORDER — GABAPENTIN 800 MG/1
1 TABLET ORAL
Qty: 0 | Refills: 0 | DISCHARGE
Start: 2025-02-13

## 2025-02-13 RX ORDER — ACETAMINOPHEN 160 MG/5ML
2 SUSPENSION ORAL
Qty: 0 | Refills: 0 | DISCHARGE
Start: 2025-02-13

## 2025-02-13 RX ORDER — SACUBITRIL AND VALSARTAN 49; 51 MG/1; MG/1
1 TABLET, FILM COATED ORAL
Refills: 0 | DISCHARGE

## 2025-02-13 RX ORDER — HYDRALAZINE HCL 100 MG
3 TABLET ORAL
Qty: 0 | Refills: 0 | DISCHARGE
Start: 2025-02-13

## 2025-02-13 RX ORDER — DULOXETINE 20 MG/1
1 CAPSULE, DELAYED RELEASE ORAL
Qty: 0 | Refills: 0 | DISCHARGE
Start: 2025-02-13

## 2025-02-13 RX ORDER — MONTELUKAST SODIUM 5 MG/1
1 TABLET, CHEWABLE ORAL
Qty: 0 | Refills: 0 | DISCHARGE
Start: 2025-02-13

## 2025-02-13 RX ORDER — BUSPIRONE HYDROCHLORIDE 5 MG/1
1 TABLET ORAL
Qty: 30 | Refills: 0
Start: 2025-02-13 | End: 2025-03-14

## 2025-02-13 RX ORDER — PANTOPRAZOLE 20 MG/1
1 TABLET, DELAYED RELEASE ORAL
Qty: 30 | Refills: 0
Start: 2025-02-13 | End: 2025-03-14

## 2025-02-13 RX ORDER — MECOBAL/LEVOMEFOLAT CA/B6 PHOS 2-3-35 MG
1 TABLET ORAL
Qty: 0 | Refills: 0 | DISCHARGE
Start: 2025-02-13

## 2025-02-13 RX ORDER — HYDRALAZINE HCL 100 MG
3 TABLET ORAL
Refills: 0 | DISCHARGE

## 2025-02-13 RX ORDER — METOPROLOL SUCCINATE 25 MG
1 TABLET, EXTENDED RELEASE 24 HR ORAL
Qty: 0 | Refills: 0 | DISCHARGE
Start: 2025-02-13

## 2025-02-13 RX ADMIN — TRAMADOL HYDROCHLORIDE 50 MILLIGRAM(S): 100 TABLET, EXTENDED RELEASE ORAL at 18:02

## 2025-02-13 RX ADMIN — TIOTROPIUM BROMIDE MONOHYDRATE 2 PUFF(S): 18 CAPSULE ORAL; RESPIRATORY (INHALATION) at 12:19

## 2025-02-13 RX ADMIN — Medication 1 TABLET(S): at 13:37

## 2025-02-13 RX ADMIN — Medication 1 APPLICATION(S): at 18:22

## 2025-02-13 RX ADMIN — BUDESONIDE 0.5 MILLIGRAM(S): 9 TABLET, EXTENDED RELEASE ORAL at 18:21

## 2025-02-13 RX ADMIN — APIXABAN 5 MILLIGRAM(S): 5 TABLET, FILM COATED ORAL at 06:01

## 2025-02-13 RX ADMIN — Medication 1 APPLICATION(S): at 06:02

## 2025-02-13 RX ADMIN — ACETAMINOPHEN 400 MILLIGRAM(S): 160 SUSPENSION ORAL at 09:12

## 2025-02-13 RX ADMIN — Medication 75 MILLIGRAM(S): at 18:20

## 2025-02-13 RX ADMIN — GABAPENTIN 100 MILLIGRAM(S): 800 TABLET ORAL at 21:09

## 2025-02-13 RX ADMIN — GABAPENTIN 100 MILLIGRAM(S): 800 TABLET ORAL at 13:37

## 2025-02-13 RX ADMIN — TRAMADOL HYDROCHLORIDE 50 MILLIGRAM(S): 100 TABLET, EXTENDED RELEASE ORAL at 06:01

## 2025-02-13 RX ADMIN — Medication 75 MILLIGRAM(S): at 06:01

## 2025-02-13 RX ADMIN — ATORVASTATIN CALCIUM 40 MILLIGRAM(S): 80 TABLET, FILM COATED ORAL at 21:09

## 2025-02-13 RX ADMIN — DULOXETINE 60 MILLIGRAM(S): 20 CAPSULE, DELAYED RELEASE ORAL at 13:37

## 2025-02-13 RX ADMIN — SACUBITRIL AND VALSARTAN 1 TABLET(S): 49; 51 TABLET, FILM COATED ORAL at 18:20

## 2025-02-13 RX ADMIN — GABAPENTIN 100 MILLIGRAM(S): 800 TABLET ORAL at 06:02

## 2025-02-13 RX ADMIN — BUDESONIDE 0.5 MILLIGRAM(S): 9 TABLET, EXTENDED RELEASE ORAL at 06:02

## 2025-02-13 RX ADMIN — MONTELUKAST SODIUM 10 MILLIGRAM(S): 5 TABLET, CHEWABLE ORAL at 13:37

## 2025-02-13 RX ADMIN — BUSPIRONE HYDROCHLORIDE 10 MILLIGRAM(S): 5 TABLET ORAL at 14:40

## 2025-02-13 RX ADMIN — LEVOTHYROXINE SODIUM 125 MICROGRAM(S): 25 TABLET ORAL at 06:02

## 2025-02-13 RX ADMIN — PANTOPRAZOLE 40 MILLIGRAM(S): 20 TABLET, DELAYED RELEASE ORAL at 12:28

## 2025-02-13 RX ADMIN — APIXABAN 5 MILLIGRAM(S): 5 TABLET, FILM COATED ORAL at 18:20

## 2025-02-13 RX ADMIN — Medication 5 MILLIGRAM(S): at 06:01

## 2025-02-13 RX ADMIN — SACUBITRIL AND VALSARTAN 1 TABLET(S): 49; 51 TABLET, FILM COATED ORAL at 06:02

## 2025-02-13 RX ADMIN — Medication 25 MILLIGRAM(S): at 18:20

## 2025-02-13 NOTE — DISCHARGE NOTE NURSING/CASE MANAGEMENT/SOCIAL WORK - NSDCVIVACCINE_GEN_ALL_CORE_FT
Tdap; 19-Nov-2019 16:31; Christine Skaggs (RN); [employer]; [id number] (Exp. Date: 17-Sep-2021); IntraMuscular; Deltoid Left.; 0.5 milliLiter(s); VIS (VIS Published: 09-May-2013, VIS Presented: 19-Nov-2019);   Tdap; 07-Feb-2024 10:35; Daria Mcgrath (ADDI); [employer]; [id number] (Exp. Date: 20-Jul-2025); IntraMuscular; Deltoid Left.; 0.5 milliLiter(s); VIS (VIS Published: 09-May-2013, VIS Presented: 07-Feb-2024);

## 2025-02-13 NOTE — PROGRESS NOTE ADULT - REASON FOR ADMISSION
poor social / living situation, vaginal bleed, pain

## 2025-02-13 NOTE — DISCHARGE NOTE NURSING/CASE MANAGEMENT/SOCIAL WORK - FINANCIAL ASSISTANCE
[facility] provides services at a reduced cost to those who are determined to be eligible through [facility]’s financial assistance program. Information regarding [facility]’s financial assistance program can be found by going to [email or web address] or by calling [phone].

## 2025-02-13 NOTE — PROGRESS NOTE ADULT - PROVIDER SPECIALTY LIST ADULT
Cardiology
GYN
Internal Medicine
Cardiology
Internal Medicine
Cardiology
Internal Medicine
Internal Medicine

## 2025-02-13 NOTE — DISCHARGE NOTE PROVIDER - NSFOLLOWUPCLINICS_GEN_ALL_ED_FT
[facility] Gynecology and Obstetrics  Gynceology/OB  [address] [place] [address]  Phone: [phone]  Fax:   Follow Up Time: Routine

## 2025-02-13 NOTE — DISCHARGE NOTE PROVIDER - NSDCMRMEDTOKEN_GEN_ALL_CORE_FT
acetaminophen 325 mg oral tablet: 2 tab(s) orally every 6 hours As needed Temp greater or equal to 38C (100.4F), Mild Pain (1 - 3)  amLODIPine 5 mg oral tablet: 1 tab(s) orally once a day  apixaban 5 mg oral tablet: 1 tab(s) orally every 12 hours  atorvastatin 40 mg oral tablet: 1 tab(s) orally once a day (at bedtime)  busPIRone 10 mg oral tablet: 1 tab(s) orally once a day (in the afternoon)  DULoxetine 60 mg oral delayed release capsule: 1 cap(s) orally once a day  gabapentin 100 mg oral capsule: 1 cap(s) orally 3 times a day  hydrALAZINE 25 mg oral tablet: 3 tab(s) orally 2 times a day  levothyroxine 125 mcg (0.125 mg) oral tablet: 1 tab(s) orally once a day  metoprolol tartrate 25 mg oral tablet: 1 tab(s) orally 2 times a day  montelukast 10 mg oral tablet: 1 tab(s) orally once a day  Multiple Vitamins oral tablet: 1 tab(s) orally once a day  nystatin-triamcinolone 100,000 units/g-0.1% topical cream: 1 Apply topically to affected area 2 times a day  Protonix 40 mg oral delayed release tablet: 1 tab(s) orally once a day before breakfast  sacubitril-valsartan 24 mg-26 mg oral tablet: 1 tab(s) orally 2 times a day  tiotropium 2.5 mcg/inh inhalation aerosol: 2 puff(s) inhaled once a day  traMADol 50 mg oral tablet: 1 tab(s) orally 2 times a day  zinc oxide 20% topical ointment: 1 Apply topically to affected area 2 times a day

## 2025-02-13 NOTE — PROGRESS NOTE ADULT - ASSESSMENT
_________________________________________________________________________________________  ========>>  M E D I C A L   A T T E N D I N G    F O L L O W  U P  N O T E  <<=========  -----------------------------------------------------------------------------------------------------    - Patient seen and examined by me earlier today.   - Patient today overall doing ok, comfortable, NPO for OR    ==================>> REVIEW OF SYSTEM <<=================    GEN: no fever, no chills, no pain  RESP: no SOB, no cough, no sputum  CVS: no chest pain, no palpitations  GI: no abdominal pain, no nausea  : no dysuria, no frequency reported.. no more bleeding reported   Neuro: no headache, no dizziness    ==================>> PHYSICAL EXAM <<=================    GEN: A&O X 3 , NAD , comfortable, pleasant   HEENT: NCAT, PERRL, MMM, hearing intact  CVS: S1S2 , regular , No M/R/G appreciated  PULM: CTA B/L,  no W/R/R appreciated  ABD.: soft. non tender, non distended,  obese   Extrem: intact pulses , chronic edema and stasis changes / ulcers.. dressed         ( Note written / Date of service 02-07-25 ( This is certified to be the same as "ENTERED" date above ( for billing purposes)))    ==================>> MEDICATIONS <<====================    acetaminophen   IVPB .. 1000 milliGRAM(s) IV Intermittent once  amLODIPine   Tablet 5 milliGRAM(s) Oral daily  atorvastatin 40 milliGRAM(s) Oral at bedtime  buDESOnide    Inhalation Suspension 0.5 milliGRAM(s) Inhalation every 12 hours  busPIRone 10 milliGRAM(s) Oral <User Schedule>  DULoxetine 60 milliGRAM(s) Oral daily  gabapentin 100 milliGRAM(s) Oral three times a day  hydrALAZINE 75 milliGRAM(s) Oral two times a day  levothyroxine 125 MICROGram(s) Oral daily  metoprolol tartrate 25 milliGRAM(s) Oral two times a day  montelukast 10 milliGRAM(s) Oral daily  multivitamin 1 Tablet(s) Oral daily  nystatin/triamcinolone Cream 1 Application(s) Topical two times a day  pantoprazole  Injectable 40 milliGRAM(s) IV Push daily  sacubitril 24 mG/valsartan 26 mG 1 Tablet(s) Oral two times a day  tiotropium 2.5 MICROgram(s) Inhaler 2 Puff(s) Inhalation daily  zinc oxide 20% Ointment 1 Application(s) Topical two times a day    MEDICATIONS  (PRN):  acetaminophen     Tablet .. 650 milliGRAM(s) Oral every 6 hours PRN Temp greater or equal to 38C (100.4F), Mild Pain (1 - 3)  aluminum hydroxide/magnesium hydroxide/simethicone Suspension 30 milliLiter(s) Oral every 4 hours PRN Dyspepsia  melatonin 3 milliGRAM(s) Oral at bedtime PRN Insomnia  ondansetron Injectable 4 milliGRAM(s) IV Push every 8 hours PRN Nausea and/or Vomiting  traMADol 50 milliGRAM(s) Oral two times a day PRN Moderate Pain (4 - 6)    ___________  Active diet:  Diet, NPO after Midnight:      NPO Start Date: 06-Feb-2025,   NPO Start Time: 23:59  Diet, Regular:   DASH/TLC Sodium & Cholesterol Restricted (DASH)  ___________________    ==================>> VITAL SIGNS <<==================    Vital Signs Last 24 HrsT(C): 36.3 (02-07-25 @ 11:39)  T(F): 97.4 (02-07-25 @ 11:39), Max: 98 (02-06-25 @ 17:14)  HR: 62 (02-07-25 @ 11:39) (60 - 64)  BP: 138/64 (02-07-25 @ 11:39)  RR: 18 (02-07-25 @ 11:39) (18 - 18)  SpO2: 96% (02-07-25 @ 11:39) (93% - 96%)       ==================>> LAB AND IMAGING <<==================                        11.4   7.91  )-----------( 171      ( 07 Feb 2025 06:50 )             36.6        02-07    140  |  104  |  18  ----------------------------<  89  3.9   |  26  |  0.60    Ca    9.8      07 Feb 2025 06:50      WBC count:   7.91 <<== ,  6.92 <<== ,  8.78 <<==   Hemoglobin:   11.4 <<==,  11.1 <<==,  12.3 <<==  platelets:  171 <==, 176 <==, 208 <==    Creatinine:  0.60  <<==, 0.70  <<==, 0.71  <<==  Sodium:   140  <==, 141  <==, 141  <==       AST:          14(02-04) <== , 13(02-03) <==      ALT:        10(02-04)  <== , 14(02-03)  <==      AP:        98(02-04)  <=, 111(02-03)  <=     Bili:        0.6(02-04)  <=, 0.4(02-03)  <=    ____________________________    M I C R O B I O L O G Y :    Culture - Urine (collected 03 Feb 2025 20:06)  Source: Clean Catch Clean Catch (Midstream)  Final Report (07 Feb 2025 09:37):    >100,000 CFU/ml Escherichia coli    10,000 - 49,000 CFU/mL Proteus mirabilis  Organism: Escherichia coli  Proteus mirabilis (07 Feb 2025 09:37)  Organism: Proteus mirabilis (07 Feb 2025 09:37)    Sensitivities:      Method Type: MARIA M      -  Amoxicillin/Clavulanic Acid: S <=8/4      -  Ampicillin: S <=8 These ampicillin results predict results for amoxicillin      -  Ampicillin/Sulbactam: S <=4/2      -  Aztreonam: S <=4      -  Cefazolin: S <=2 For uncomplicated UTI with K. pneumoniae, E. coli, or P. mirablis: MARIA M <=16 is sensitive and MARIA M >=32 is resistant. This also predicts results for oral agents cefaclor, cefdinir, cefpodoxime, cefprozil, cefuroxime axetil, cephalexin and locarbef for uncomplicated UTI. Note that some isolates may be susceptible to these agents while testing resistant to cefazolin.      -  Cefepime: S <=2      -  Cefoxitin: S <=8      -  Ceftriaxone: S <=1      -  Cefuroxime: S <=4      -  Ciprofloxacin: S <=0.25      -  Ertapenem: S <=0.5      -  Gentamicin: S <=2      -  Levofloxacin: S <=0.5      -  Meropenem: S <=1      -  Nitrofurantoin: R >64 Should not be used to treat pyelonephritis      -  Piperacillin/Tazobactam: S <=8      -  Tobramycin: S <=2      -  Trimethoprim/Sulfamethoxazole: S <=0.5/9.5  Organism: Escherichia coli (07 Feb 2025 09:37)    Sensitivities:      Method Type: MARIA M      -  Amoxicillin/Clavulanic Acid: S <=8/4      -  Ampicillin: R >16 These ampicillin results predict results for amoxicillin      -  Ampicillin/Sulbactam: S 8/4      -  Aztreonam: S <=4      -  Cefazolin: S <=2 For uncomplicated UTI with K. pneumoniae, E. coli, or P. mirablis: MARIA M <=16 is sensitive and MARIA M >=32 is resistant. This also predicts results for oral agents cefaclor, cefdinir, cefpodoxime, cefprozil, cefuroxime axetil, cephalexin and locarbef for uncomplicated UTI. Note that some isolates may be susceptible to these agents while testing resistant to cefazolin.      -  Cefepime: S <=2      -  Cefoxitin: S <=8      -  Ceftriaxone: S <=1      -  Cefuroxime: S <=4      -  Ciprofloxacin: I 0.5      -  Ertapenem: S <=0.5      -  Gentamicin: S <=2      -  Imipenem: S <=1      -  Levofloxacin: S <=0.5      -  Meropenem: S <=1      -  Nitrofurantoin: S <=32 Should not be used to treat pyelonephritis      -  Piperacillin/Tazobactam: S <=8      -  Tobramycin: S <=2      -  Trimethoprim/Sulfamethoxazole: S <=0.5/9.5      < from: MR Pelvis w/wo IV Cont (02.04.25 @ 23:20) >  IMPRESSION:  No vesicovaginal fistula.  < end of copied text >    < from: US Pelvis Complete (02.04.25 @ 12:42) >  IMPRESSION:  1. Very limited transabdominal examination. Findings compatible with air   within the endometrial cavity, endocervical canal and vaginal canal.   Etiology cannot be determined on this examination. Advise clinical   correlation to exclude infection. The uterus and endometrium otherwise   cannot be assessed on this exam.  2. The right ovary is grossly unremarkable.  3. The left ovary was not visualized.  < end of copied text >      < from: CT Abdomen and Pelvis w/ IV Cont (02.03.25 @ 19:14) >  IMPRESSION:  Probable urinary bladder cystitis. Correlate with urinalysis.   Indeterminate right renal lesion measures 1.4 cm on image 52 series 301.   Correlate with contrast-enhanced abdominal MRI for characterization.  Air is noted within the endometrial canal. Endovaginal canal is partially   filled with fluid. These findings are of unclear clinical significance.   GYN evaluation is recommended given the history of vaginal bleeding.  Anterior abdominal wall laxity. Small to moderate size fat-containing   hernia with nodular densities within the hernia and at the hernia   entrance site. Additional right anterior subcutaneous nodular density.   Most likely these findings are related to recent injections. Correlate   clinically. Follow-up CT abdomen pelvis is recommended in 3 months to exclude lesions.  < end of copied text >    ___________________________________________________________________________________  ===============>>  A S S E S S M E N T   A N D   P L A N <<===============  ------------------------------------------------------------------------------------------    patient is a 74  year old woman, AAOX3, from home, wheelchair-bound and uses cane occasionally , w/ PMHx COPD on 3 L NC at home, Obesity, Hypothyroidism, Diastolic HF previously on Lasix, Afib on Eliquis, Breast cancer s/p surgery, Asthma, Chronic lymphedema, Kidney cancer s/p partial nephrectomy, anxiety, presenting to the ER  with blood in diaper when changed by HHA. appeared as dark red blood in diaper x 3 today. appears to be coming from vaginal area, not rectum. states increased urination yesterday. also reports chest pain started yesterday. nonradiating ( upon further questioning patient reports pain all over the body).  pelvic exam done by ER doctor limited exaxm, vaginal vault visualized in speculum, ?scant blood > CT scan ordered, pending  Upon further discussed with pt's son who is at bedside.. patient lives at home with , who has been gradually becoming more forgetful and falling a lot himself, and unable to care for himself or for he patient ( as the primary care taker for many years)...   pt's son Alexander offered fot them both to move in with him but they refused and patient and  have also been refusing to go to see their doctor and kept cancelling their appointments..  at this pont patient and son in agreement that patient and  ( also being admitted) would need to be placed together into a long term care center..        Problem/Plan - 1:  ·  Problem: Problem related to living arrangement.   ·  Plan: as above  social work evaluation for placement for patient and    supportive care.     Problem/Plan - 2:  ·  Problem: vaginal bleeding.   ·  Plan: CT scan as above with abnormality   evaluation for mass / fibroid or other causes of bleeding  continuing  hold for planned OR biopsy today  GYN appreciated      medically stable for planned procedure ..     cardio appreciated as well      bacteriuria, asymptomatic, however given patient going to OR already given antibiotics and treated   Cancer Antigen, 125 WNL  imaging as above      Problem/Plan - 3:  ·  Problem: Generalized pain.   ·  Plan: chronic > stable / improved   continue Tramadol PRN  chest pain nonspecific   Troponin  :: 14  <<--, 13    Pro-BNP: 283 (02-03-25 @ 15:22)  cardio on board..      Problem/Plan - 4:  ·  Problem: CAD (coronary artery disease).   ·  Plan: Continue home medications and monitor.  cardio following      Problem/Plan - 5:  ·  Problem: Functional hemiparesis.   ·  Plan: patient chronically bed / WC bound, can't really ambulate : total care  bed > chair and peter to chair as able  turn and position / pressure ulcer precautions.     Problem/Plan - 6:  ·  Problem: COPD, severe.   ·  Plan: stable now  Continue home medications and monitor.  O2 NC  supportive care  monitor.     Problem/Plan - 7:  ·  Problem: Chronic atrial fibrillation.   ·  Plan: Eliquis  Continue Current medications otherwise and monitor.   cardio appreciated      Problem/Plan - 8:  ·  Problem: Lymphedema.   ·  Plan: previously on diuretics > not on med list now!    > elevation, increase activity as able  will consider ACE wrapping  diuretics as needed.    -GI/DVT Prophylaxis per protocol.    Estimate Discharge  pending above and placement to rehabilitation   --------------------------------------------  Case discussed with patient, RN, Nurse Practitioner, daughter   Education given on findings and plan of care  ___________________________  H. JANICE Welch.  Pager: [phone]    
  74  year old woman,       AAOX3, from home, wheelchair-bound and uses cane occasionally       , w/ PMHx COPD on 3 L NC at home, Obesity, Hypothyroidism, Diastolic HF previously on Lasix,       Afib on Eliquis, Breast cancer s/p surgery, Asthma,       Chronic lymphedema, Kidney cancer s/p partial nephrectomy, anxiety,     presenting to the ER  with blood in diaper when changed by HHA. appeared as dark red blood in diaper x 3 today.      appears to be coming from vaginal area, not rectum.       atypical cp. can defer ischemic eval at this time.  pe r card  cw gdmt for lv dysfunction  cw rate control meds for paf. ac on hold = resume when able  diuretics prn to maintain euvolemic  gyn consulted for vaginal bleeding. , exam   unable to be performed    gyn  has  duscussed  with family, mri  pelvis   no fistula     HTN/ HLD     c/c diastolcic   CHF     C/C  Afib  and  a/c  on hold, given bleed  Hypothyroid   COPD    dvt ppx        rad      
  _________________________________________________________________________________________  ========>>  M E D I C A L   A T T E N D I N G    F O L L O W  U P  N O T E  <<=========  -----------------------------------------------------------------------------------------------------    - Patient seen and examined by me earlier today.   - In summary,  SHEKHAR VASQUEZ is a 74y year old woman admitted with vag bleed  - Patient today overall doing ok, comfortable, eating OK.   >> patient also with grossly positive U/A >> started on antibiotics for UTI > pending cultures     ==================>> REVIEW OF SYSTEM <<=================    GEN: no fever, no chills, no pain  RESP: no SOB, no cough, no sputum  CVS: no chest pain, no palpitations  GI: no abdominal pain, no nausea  : no dysuria, no frequency reported.. no more bleeding reported   Neuro: no headache, no dizziness    ==================>> PHYSICAL EXAM <<=================    GEN: A&O X 3 , NAD , comfortable, pleasant, a little nervous > reassured   HEENT: NCAT, PERRL, MMM, hearing intact  CVS: S1S2 , regular , No M/R/G appreciated  PULM: CTA B/L,  no W/R/R appreciated  ABD.: soft. non tender, non distended,  obese   Extrem: intact pulses , chronic edema and stasis changes / ulcers.. dressed           ( Note written / Date of service 02-04-25 ( This is certified to be the same as "ENTERED" date above ( for billing purposes)))    ==================>> MEDICATIONS <<====================    MEDICATIONS  (STANDING):  acetaminophen   IVPB .. 1000 milliGRAM(s) IV Intermittent once  amLODIPine   Tablet 5 milliGRAM(s) Oral daily  apixaban 5 milliGRAM(s) Oral every 12 hours  atorvastatin 40 milliGRAM(s) Oral at bedtime  buDESOnide    Inhalation Suspension 0.5 milliGRAM(s) Inhalation every 12 hours  busPIRone 10 milliGRAM(s) Oral <User Schedule>  cefTRIAXone   IVPB 1000 milliGRAM(s) IV Intermittent every 24 hours  DULoxetine 60 milliGRAM(s) Oral daily  gabapentin 100 milliGRAM(s) Oral three times a day  hydrALAZINE 75 milliGRAM(s) Oral two times a day  levothyroxine 125 MICROGram(s) Oral daily  metoprolol tartrate 25 milliGRAM(s) Oral two times a day  montelukast 10 milliGRAM(s) Oral daily  multivitamin 1 Tablet(s) Oral daily  nystatin/triamcinolone Cream 1 Application(s) Topical two times a day  pantoprazole  Injectable 40 milliGRAM(s) IV Push daily  sacubitril 24 mG/valsartan 26 mG 1 Tablet(s) Oral two times a day  tiotropium 2.5 MICROgram(s) Inhaler 2 Puff(s) Inhalation daily  zinc oxide 20% Ointment 1 Application(s) Topical two times a day    MEDICATIONS  (PRN):  acetaminophen     Tablet .. 650 milliGRAM(s) Oral every 6 hours PRN Temp greater or equal to 38C (100.4F), Mild Pain (1 - 3)  aluminum hydroxide/magnesium hydroxide/simethicone Suspension 30 milliLiter(s) Oral every 4 hours PRN Dyspepsia  melatonin 3 milliGRAM(s) Oral at bedtime PRN Insomnia  ondansetron Injectable 4 milliGRAM(s) IV Push every 8 hours PRN Nausea and/or Vomiting  traMADol 50 milliGRAM(s) Oral two times a day PRN Moderate Pain (4 - 6)    ___________  Active diet:  Diet, Regular:   DASH/TLC Sodium & Cholesterol Restricted (DASH)  ___________________    ==================>> VITAL SIGNS <<==================    T(C): 36.7 (02-04-25 @ 05:35), Max: 36.7 (02-03-25 @ 19:35)  HR: 64 (02-04-25 @ 05:35) (64 - 73)  BP: 129/64 (02-04-25 @ 05:35) (129/64 - 161/65)  RR: 18 (02-04-25 @ 05:35) (18 - 20)  SpO2: 96% (02-04-25 @ 05:35) (93% - 98%)     I&O's Summary    04 Feb 2025 07:01  -  04 Feb 2025 13:59  --------------------------------------------------------  IN: 240 mL / OUT: 0 mL / NET: 240 mL     ==================>> LAB AND IMAGING <<==================                        11.1   6.92  )-----------( 176      ( 04 Feb 2025 07:32 )             36.1        02-04    141  |  104  |  16  ----------------------------<  69[L]  3.9   |  24  |  0.70    Ca    10.0      04 Feb 2025 07:32    TPro  6.4  /  Alb  3.4  /  TBili  0.6  /  DBili  x   /  AST  14  /  ALT  10  /  AlkPhos  98  02-04    PT/INR - ( 03 Feb 2025 15:22 )   PT: 13.9 sec;   INR: 1.21 ratio    PTT - ( 03 Feb 2025 15:22 )  PTT:97.7 sec               Urinalysis:  02-03-25 @ 20:06  Leuk. Est.: Moderate  Nirite: Positive  WBC: 43  Blood: Large     salt Crystal: --     calcium crystal: --     Bili: Negative     cast: 9  color: Orange  Bacteria: Too Numerous to count  Epith. cell: 2  Yeast: --     Ketone: Negative     Protein: Trace     Glucose: Negative     sperm: --     Spec.Gravity: 1.019    Lipid profile:  (02-04-25)     Total: 145     LDL  : (p)     HDL  :42     TG   :93     HgA1C:   (02-04-25)          (02-04-25)      5.4    < from: CT Abdomen and Pelvis w/ IV Cont (02.03.25 @ 19:14) >  IMPRESSION:  Probable urinary bladder cystitis. Correlate with urinalysis.   Indeterminate right renal lesion measures 1.4 cm on image 52 series 301.   Correlate with contrast-enhanced abdominal MRI for characterization.  Air is noted within the endometrial canal. Endovaginal canal is partially   filled with fluid. These findings are of unclear clinical significance.   GYN evaluation is recommended given the history of vaginal bleeding.  Anterior abdominal wall laxity. Small to moderate size fat-containing   hernia with nodular densities within the hernia and at the hernia   entrance site. Additional right anterior subcutaneous nodular density.   Most likely these findings are related to recent injections. Correlate   clinically. Follow-up CT abdomen pelvis is recommended in 3 months to exclude lesions.  < end of copied text >    ___________________________________________________________________________________  ===============>>  A S S E S S M E N T   A N D   P L A N <<===============  ------------------------------------------------------------------------------------------    patient is a 74  year old woman, AAOX3, from home, wheelchair-bound and uses cane occasionally , w/ PMHx COPD on 3 L NC at home, Obesity, Hypothyroidism, Diastolic HF previously on Lasix, Afib on Eliquis, Breast cancer s/p surgery, Asthma, Chronic lymphedema, Kidney cancer s/p partial nephrectomy, anxiety, presenting to the ER  with blood in diaper when changed by HHA. appeared as dark red blood in diaper x 3 today. appears to be coming from vaginal area, not rectum. states increased urination yesterday. also reports chest pain started yesterday. nonradiating ( upon further questioning patient reports pain all over the body).  pelvic exam done by ER doctor limited exaxm, vaginal vault visualized in speculum, ?scant blood > CT scan ordered, pending  Upon further discussed with pt's son who is at bedside.. patient lives at home with , who has been gradually becoming more forgetful and falling a lot himself, and unable to care for himself or for he patient ( as the primary care taker for many years)...   pt's son Alexander offered fot them both to move in with him but they refused and patient and  have also been refusing to go to see their doctor and kept cancelling their appointments..  at this [place] patient and son in agreement that patient and  ( also being admitted) would need to be placed together into a long term care center..        Problem/Plan - 1:  ·  Problem: Problem related to living arrangement.   ·  Plan: as above  social work evaluation for placement for patient and    supportive care.     Problem/Plan - 2:  ·  Problem: vaginal bleeding.   ·  Plan: CT scan as above with abnormality   evaluation for mass / fibroid or other causes of bleeding  continuing Eliquis for now > stop if gross bleeding  GYN evaluation in process   check    monitor    ## >> patient also with grossly positive U/A       >> started on antibiotics for UTI > pending cultures      Problem/Plan - 3:  ·  Problem: Generalized pain.   ·  Plan: chronic  continue Tramadol PRN  chest pain nonspecific   Troponin  :: 14  <<--, 13    Pro-BNP: 283 (02-03-25 @ 15:22)  cardio on board..      Problem/Plan - 4:  ·  Problem: CAD (coronary artery disease).   ·  Plan: Continue home medications and monitor.  cardio following      Problem/Plan - 5:  ·  Problem: Functional hemiparesis.   ·  Plan: patient chronically bed / WC bound, can't really ambulate : total care  bed > chair and peter to chair as able  turn and position / pressure ulcer precautions.     Problem/Plan - 6:  ·  Problem: COPD, severe.   ·  Plan: stable now  Continue home medications and monitor.  O2 NC  supportive care  monitor.     Problem/Plan - 7:  ·  Problem: Chronic atrial fibrillation.   ·  Plan: Eliquis  Continue Current medications otherwise and monitor.   cardio appreciated      Problem/Plan - 8:  ·  Problem: Lymphedema.   ·  Plan: previously on diuretics > not on med list now!    > elevation, increase activity as able  will consider ACE wrapping  diuretics as needed.    -GI/DVT Prophylaxis per protocol.    Estimate Discharge : 48-72 hrs?  --------------------------------------------  Case discussed with patient, med team..   Education given on findings and plan of care  ___________________________  H. JANICE Welch.  Pager: [phone]    
  _________________________________________________________________________________________  ========>>  M E D I C A L   A T T E N D I N G    F O L L O W  U P  N O T E  <<=========  -----------------------------------------------------------------------------------------------------    - Patient seen and examined by me earlier today.   - Patient today overall doing ok, comfortable    ==================>> REVIEW OF SYSTEM <<=================    GEN: no fever, no chills, no pain  RESP: no SOB, no cough, no sputum  CVS: no chest pain, no palpitations  GI: no abdominal pain, no nausea  : no dysuria, no frequency reported.. no more bleeding reported   Neuro: no headache, no dizziness    ==================>> PHYSICAL EXAM <<=================    GEN: A&O X 3 , NAD , comfortable, pleasant   HEENT: NCAT, PERRL, MMM, hearing intact  CVS: S1S2 , regular , No M/R/G appreciated  PULM: CTA B/L,  no W/R/R appreciated  ABD.: soft. non tender, non distended,  obese   Extrem: intact pulses , chronic edema and stasis changes / ulcers.. dressed           ( Note written / Date of service 02-10-25 ( This is certified to be the same as "ENTERED" date above ( for billing purposes)))    ==================>> MEDICATIONS <<====================    amLODIPine   Tablet 5 milliGRAM(s) Oral daily  apixaban 5 milliGRAM(s) Oral every 12 hours  atorvastatin 40 milliGRAM(s) Oral at bedtime  buDESOnide    Inhalation Suspension 0.5 milliGRAM(s) Inhalation every 12 hours  busPIRone 10 milliGRAM(s) Oral <User Schedule>  DULoxetine 60 milliGRAM(s) Oral daily  gabapentin 100 milliGRAM(s) Oral three times a day  hydrALAZINE 75 milliGRAM(s) Oral two times a day  levothyroxine 125 MICROGram(s) Oral daily  metoprolol tartrate 25 milliGRAM(s) Oral two times a day  montelukast 10 milliGRAM(s) Oral daily  multivitamin 1 Tablet(s) Oral daily  nystatin/triamcinolone Cream 1 Application(s) Topical two times a day  pantoprazole  Injectable 40 milliGRAM(s) IV Push daily  sacubitril 24 mG/valsartan 26 mG 1 Tablet(s) Oral two times a day  tiotropium 2.5 MICROgram(s) Inhaler 2 Puff(s) Inhalation daily  zinc oxide 20% Ointment 1 Application(s) Topical two times a day    MEDICATIONS  (PRN):  acetaminophen     Tablet .. 650 milliGRAM(s) Oral every 6 hours PRN Temp greater or equal to 38C (100.4F), Mild Pain (1 - 3)  aluminum hydroxide/magnesium hydroxide/simethicone Suspension 30 milliLiter(s) Oral every 4 hours PRN Dyspepsia  melatonin 3 milliGRAM(s) Oral at bedtime PRN Insomnia  ondansetron Injectable 4 milliGRAM(s) IV Push every 8 hours PRN Nausea and/or Vomiting  traMADol 50 milliGRAM(s) Oral two times a day PRN Moderate Pain (4 - 6)    ___________  Active diet:  Diet, Regular:   DASH/TLC Sodium & Cholesterol Restricted (DASH)  Tin(7 Gm Arginine/7 Gm Glut/1.2 Gm HMB     Qty per Day:  1  Liquid Protein Supplement     Qty per Day:  1  ___________________    ==================>> VITAL SIGNS <<==================     Vital Signs Last 24 HrsT(C): 36.6 (02-10-25 @ 12:01)  T(F): 97.8 (02-10-25 @ 12:01), Max: 98.4 (02-09-25 @ 20:27)  HR: 67 (02-10-25 @ 12:01) (59 - 67)  BP: 111/66 (02-10-25 @ 12:01)  RR: 18 (02-10-25 @ 12:01) (18 - 18)  SpO2: 94% (02-10-25 @ 12:01) (92% - 98%)         ==================>> LAB AND IMAGING <<==================                        11.5   9.69  )-----------( 173      ( 10 Feb 2025 08:22 )             37.4                 < from: MR Pelvis w/wo IV Cont (02.04.25 @ 23:20) >  IMPRESSION:  No vesicovaginal fistula.  < end of copied text >    < from: US Pelvis Complete (02.04.25 @ 12:42) >  IMPRESSION:  1. Very limited transabdominal examination. Findings compatible with air   within the endometrial cavity, endocervical canal and vaginal canal.   Etiology cannot be determined on this examination. Advise clinical   correlation to exclude infection. The uterus and endometrium otherwise   cannot be assessed on this exam.  2. The right ovary is grossly unremarkable.  3. The left ovary was not visualized.  < end of copied text >      < from: CT Abdomen and Pelvis w/ IV Cont (02.03.25 @ 19:14) >  IMPRESSION:  Probable urinary bladder cystitis. Correlate with urinalysis.   Indeterminate right renal lesion measures 1.4 cm on image 52 series 301.   Correlate with contrast-enhanced abdominal MRI for characterization.  Air is noted within the endometrial canal. Endovaginal canal is partially   filled with fluid. These findings are of unclear clinical significance.   GYN evaluation is recommended given the history of vaginal bleeding.  Anterior abdominal wall laxity. Small to moderate size fat-containing   hernia with nodular densities within the hernia and at the hernia   entrance site. Additional right anterior subcutaneous nodular density.   Most likely these findings are related to recent injections. Correlate   clinically. Follow-up CT abdomen pelvis is recommended in 3 months to exclude lesions.  < end of copied text >    ___________________________________________________________________________________  ===============>>  A S S E S S M E N T   A N D   P L A N <<===============  ------------------------------------------------------------------------------------------    patient is a 74  year old woman, AAOX3, from home, wheelchair-bound and uses cane occasionally , w/ PMHx COPD on 3 L NC at home, Obesity, Hypothyroidism, Diastolic HF previously on Lasix, Afib on Eliquis, Breast cancer s/p surgery, Asthma, Chronic lymphedema, Kidney cancer s/p partial nephrectomy, anxiety, presenting to the ER  with blood in diaper when changed by HHA. appeared as dark red blood in diaper x 3 today. appears to be coming from vaginal area, not rectum. states increased urination yesterday. also reports chest pain started yesterday. nonradiating ( upon further questioning patient reports pain all over the body).  pelvic exam done by ER doctor limited exaxm, vaginal vault visualized in speculum, ?scant blood > CT scan ordered, pending  Upon further discussed with pt's son who is at bedside.. patient lives at home with , who has been gradually becoming more forgetful and falling a lot himself, and unable to care for himself or for he patient ( as the primary care taker for many years)...   pt's son Alexander offered fot them both to move in with him but they refused and patient and  have also been refusing to go to see their doctor and kept cancelling their appointments..  at this [place] patient and son in agreement that patient and  ( also being admitted) would need to be placed together into a long term care center..        Problem/Plan - 1:  ·  Problem: Problem related to living arrangement.   ·  Plan: as above  social work evaluation for placement for patient and    supportive care.     Problem/Plan - 2:  ·  Problem: vaginal bleeding.   GYN appreciated   Cancer Antigen, 125 WNL  imaging as above   need lose outpatient follow up     Problem/Plan - 3:  ·  Problem: CAD (coronary artery disease).   ·  Plan: Continue home medications and monitor.  cardio following      Problem/Plan - 4:  ·  Problem: Functional hemiparesis.   ·  Plan: patient chronically bed / WC bound, can't really ambulate : total care  bed > chair and peter to chair as able  turn and position / pressure ulcer precautions.     Problem/Plan - 5:  ·  Problem: COPD, severe.   ·  Plan: stable now  Continue home medications and monitor.  O2 NC  supportive care  monitor.     Problem/Plan - 6:  ·  Problem: Chronic atrial fibrillation.   ·  Plan: Eliquis  Continue Current medications otherwise and monitor.   cardio appreciated      Problem/Plan - 7:  ·  Problem: Lymphedema.   ·  Plan: previously on diuretics > not on med list now!    > elevation, increase activity as able  will consider ACE wrapping  diuretics as needed.    -GI/DVT Prophylaxis per protocol.    pending above and placement to rehabilitation ( though patient prefers to go home with her aids!) :  follow up     --------------------------------------------  Case discussed with patient, Nurse Practitioner   Education given on findings and plan of care  ___________________________  HRenetta Welch D.O.  Pager: [phone]    
  _________________________________________________________________________________________  ========>>  M E D I C A L   A T T E N D I N G    F O L L O W  U P  N O T E  <<=========  -----------------------------------------------------------------------------------------------------    - Patient seen and examined by me earlier today.   - Patient today overall doing ok, comfortable    noted unsuccessful biopsy in OR.. patient concerned and wants to have a hysterectomy .. patient educated.. will need close follow up as outpatient ( noted GYN discussed with son)     ==================>> REVIEW OF SYSTEM <<=================    GEN: no fever, no chills, no pain  RESP: no SOB, no cough, no sputum  CVS: no chest pain, no palpitations  GI: no abdominal pain, no nausea  : no dysuria, no frequency reported.. no more bleeding reported   Neuro: no headache, no dizziness    ==================>> PHYSICAL EXAM <<=================    GEN: A&O X 3 , NAD , comfortable, pleasant   HEENT: NCAT, PERRL, MMM, hearing intact  CVS: S1S2 , regular , No M/R/G appreciated  PULM: CTA B/L,  no W/R/R appreciated  ABD.: soft. non tender, non distended,  obese   Extrem: intact pulses , chronic edema and stasis changes / ulcers.. dressed           ( Note written / Date of service 02-09-25 ( This is certified to be the same as "ENTERED" date above ( for billing purposes)))    ==================>> MEDICATIONS <<====================    amLODIPine   Tablet 5 milliGRAM(s) Oral daily  apixaban 5 milliGRAM(s) Oral every 12 hours  atorvastatin 40 milliGRAM(s) Oral at bedtime  buDESOnide    Inhalation Suspension 0.5 milliGRAM(s) Inhalation every 12 hours  busPIRone 10 milliGRAM(s) Oral <User Schedule>  DULoxetine 60 milliGRAM(s) Oral daily  gabapentin 100 milliGRAM(s) Oral three times a day  hydrALAZINE 75 milliGRAM(s) Oral two times a day  levothyroxine 125 MICROGram(s) Oral daily  metoprolol tartrate 25 milliGRAM(s) Oral two times a day  montelukast 10 milliGRAM(s) Oral daily  multivitamin 1 Tablet(s) Oral daily  nystatin/triamcinolone Cream 1 Application(s) Topical two times a day  pantoprazole  Injectable 40 milliGRAM(s) IV Push daily  sacubitril 24 mG/valsartan 26 mG 1 Tablet(s) Oral two times a day  tiotropium 2.5 MICROgram(s) Inhaler 2 Puff(s) Inhalation daily  zinc oxide 20% Ointment 1 Application(s) Topical two times a day    MEDICATIONS  (PRN):  acetaminophen     Tablet .. 650 milliGRAM(s) Oral every 6 hours PRN Temp greater or equal to 38C (100.4F), Mild Pain (1 - 3)  aluminum hydroxide/magnesium hydroxide/simethicone Suspension 30 milliLiter(s) Oral every 4 hours PRN Dyspepsia  melatonin 3 milliGRAM(s) Oral at bedtime PRN Insomnia  ondansetron Injectable 4 milliGRAM(s) IV Push every 8 hours PRN Nausea and/or Vomiting  traMADol 50 milliGRAM(s) Oral two times a day PRN Moderate Pain (4 - 6)    ___________  Active diet:  Diet, Regular:   DASH/TLC Sodium & Cholesterol Restricted (DASH)  Tin(7 Gm Arginine/7 Gm Glut/1.2 Gm HMB     Qty per Day:  1  Liquid Protein Supplement     Qty per Day:  1  ___________________    ==================>> VITAL SIGNS <<==================   Height (cm): 157.5  Weight (kg): 90.7  BMI (kg/m2): 36.6  Vital Signs Last 24 HrsT(C): 36.9 (02-09-25 @ 14:22)  T(F): 98.4 (02-09-25 @ 14:22), Max: 98.4 (02-09-25 @ 14:22)  HR: 79 (02-09-25 @ 14:22) (55 - 79)  BP: 121/61 (02-09-25 @ 14:22)  RR: 18 (02-09-25 @ 14:22) (18 - 18)  SpO2: 94% (02-09-25 @ 14:22) (94% - 97%)         ==================>> LAB AND IMAGING <<==================                        11.2   8.75  )-----------( 172      ( 09 Feb 2025 07:12 )             36.9          < from: MR Pelvis w/wo IV Cont (02.04.25 @ 23:20) >  IMPRESSION:  No vesicovaginal fistula.  < end of copied text >    < from: US Pelvis Complete (02.04.25 @ 12:42) >  IMPRESSION:  1. Very limited transabdominal examination. Findings compatible with air   within the endometrial cavity, endocervical canal and vaginal canal.   Etiology cannot be determined on this examination. Advise clinical   correlation to exclude infection. The uterus and endometrium otherwise   cannot be assessed on this exam.  2. The right ovary is grossly unremarkable.  3. The left ovary was not visualized.  < end of copied text >      < from: CT Abdomen and Pelvis w/ IV Cont (02.03.25 @ 19:14) >  IMPRESSION:  Probable urinary bladder cystitis. Correlate with urinalysis.   Indeterminate right renal lesion measures 1.4 cm on image 52 series 301.   Correlate with contrast-enhanced abdominal MRI for characterization.  Air is noted within the endometrial canal. Endovaginal canal is partially   filled with fluid. These findings are of unclear clinical significance.   GYN evaluation is recommended given the history of vaginal bleeding.  Anterior abdominal wall laxity. Small to moderate size fat-containing   hernia with nodular densities within the hernia and at the hernia   entrance site. Additional right anterior subcutaneous nodular density.   Most likely these findings are related to recent injections. Correlate   clinically. Follow-up CT abdomen pelvis is recommended in 3 months to exclude lesions.  < end of copied text >    ___________________________________________________________________________________  ===============>>  A S S E S S M E N T   A N D   P L A N <<===============  ------------------------------------------------------------------------------------------    patient is a 74  year old woman, AAOX3, from home, wheelchair-bound and uses cane occasionally , w/ PMHx COPD on 3 L NC at home, Obesity, Hypothyroidism, Diastolic HF previously on Lasix, Afib on Eliquis, Breast cancer s/p surgery, Asthma, Chronic lymphedema, Kidney cancer s/p partial nephrectomy, anxiety, presenting to the ER  with blood in diaper when changed by HHA. appeared as dark red blood in diaper x 3 today. appears to be coming from vaginal area, not rectum. states increased urination yesterday. also reports chest pain started yesterday. nonradiating ( upon further questioning patient reports pain all over the body).  pelvic exam done by ER doctor limited exaxm, vaginal vault visualized in speculum, ?scant blood > CT scan ordered, pending  Upon further discussed with pt's son who is at bedside.. patient lives at home with , who has been gradually becoming more forgetful and falling a lot himself, and unable to care for himself or for he patient ( as the primary care taker for many years)...   pt's son Alexander offered fot them both to move in with him but they refused and patient and  have also been refusing to go to see their doctor and kept cancelling their appointments..  at this [place] patient and son in agreement that patient and  ( also being admitted) would need to be placed together into a long term care center..        Problem/Plan - 1:  ·  Problem: Problem related to living arrangement.   ·  Plan: as above  social work evaluation for placement for patient and    supportive care.     Problem/Plan - 2:  ·  Problem: vaginal bleeding.   GYN appreciated   Cancer Antigen, 125 WNL  imaging as above   need lose outpatient follow up     Problem/Plan - 3:  ·  Problem: CAD (coronary artery disease).   ·  Plan: Continue home medications and monitor.  cardio following      Problem/Plan - 4:  ·  Problem: Functional hemiparesis.   ·  Plan: patient chronically bed / WC bound, can't really ambulate : total care  bed > chair and peter to chair as able  turn and position / pressure ulcer precautions.     Problem/Plan - 5:  ·  Problem: COPD, severe.   ·  Plan: stable now  Continue home medications and monitor.  O2 NC  supportive care  monitor.     Problem/Plan - 6:  ·  Problem: Chronic atrial fibrillation.   ·  Plan: Eliquis  Continue Current medications otherwise and monitor.   cardio appreciated      Problem/Plan - 7:  ·  Problem: Lymphedema.   ·  Plan: previously on diuretics > not on med list now!    > elevation, increase activity as able  will consider ACE wrapping  diuretics as needed.    -GI/DVT Prophylaxis per protocol.    Estimate Discharge  pending above and placement to rehabilitation   --------------------------------------------  Case discussed with patient,   Education given on findings and plan of care  ___________________________  H. JANICE Welch.  Pager: [phone]    
  _________________________________________________________________________________________  ========>>  M E D I C A L   A T T E N D I N G    F O L L O W  U P  N O T E  <<=========  -----------------------------------------------------------------------------------------------------    - Patient seen and examined by me earlier today.   - Patient today overall doing ok, comfortable  ..     GYN team discussed with patient further yesterday > no change in recommendation or management     ==================>> REVIEW OF SYSTEM <<=================    GEN: no fever, no chills, no pain  RESP: no SOB, no cough, no sputum  CVS: no chest pain, no palpitations  GI: no abdominal pain, no nausea  : no dysuria, no frequency reported.. no more bleeding reported   Neuro: no headache, no dizziness    ==================>> PHYSICAL EXAM <<=================    GEN: A&O X 3 , NAD , comfortable, pleasant   HEENT: NCAT, PERRL, MMM, hearing intact  CVS: S1S2 , regular , No M/R/G appreciated  PULM: CTA B/L,  no W/R/R appreciated  ABD.: soft. non tender, non distended,  obese   Extrem: intact pulses , chronic edema and stasis changes / ulcers.. dressed        ( Note written / Date of service 02-12-25 ( This is certified to be the same as "ENTERED" date above ( for billing purposes)))    ==================>> MEDICATIONS <<====================    amLODIPine   Tablet 5 milliGRAM(s) Oral daily  apixaban 5 milliGRAM(s) Oral every 12 hours  atorvastatin 40 milliGRAM(s) Oral at bedtime  buDESOnide    Inhalation Suspension 0.5 milliGRAM(s) Inhalation every 12 hours  busPIRone 10 milliGRAM(s) Oral <User Schedule>  DULoxetine 60 milliGRAM(s) Oral daily  gabapentin 100 milliGRAM(s) Oral three times a day  hydrALAZINE 75 milliGRAM(s) Oral two times a day  levothyroxine 125 MICROGram(s) Oral daily  metoprolol tartrate 25 milliGRAM(s) Oral two times a day  montelukast 10 milliGRAM(s) Oral daily  multivitamin 1 Tablet(s) Oral daily  nystatin/triamcinolone Cream 1 Application(s) Topical two times a day  pantoprazole  Injectable 40 milliGRAM(s) IV Push daily  sacubitril 24 mG/valsartan 26 mG 1 Tablet(s) Oral two times a day  tiotropium 2.5 MICROgram(s) Inhaler 2 Puff(s) Inhalation daily  zinc oxide 20% Ointment 1 Application(s) Topical two times a day    MEDICATIONS  (PRN):  acetaminophen     Tablet .. 650 milliGRAM(s) Oral every 6 hours PRN Temp greater or equal to 38C (100.4F), Mild Pain (1 - 3)  aluminum hydroxide/magnesium hydroxide/simethicone Suspension 30 milliLiter(s) Oral every 4 hours PRN Dyspepsia  magnesium hydroxide Suspension 30 milliLiter(s) Oral once PRN Constipation  melatonin 3 milliGRAM(s) Oral at bedtime PRN Insomnia  ondansetron Injectable 4 milliGRAM(s) IV Push every 8 hours PRN Nausea and/or Vomiting  traMADol 50 milliGRAM(s) Oral two times a day PRN Moderate Pain (4 - 6)    ___________  Active diet:  Diet, Regular:   DASH/TLC Sodium & Cholesterol Restricted (DASH)  Tin(7 Gm Arginine/7 Gm Glut/1.2 Gm HMB     Qty per Day:  1  Liquid Protein Supplement     Qty per Day:  1  ___________________    ==================>> VITAL SIGNS <<==================    Vital Signs Last 24 HrsT(C): 36.5 (02-12-25 @ 11:34)  T(F): 97.7 (02-12-25 @ 11:34), Max: 98.6 (02-11-25 @ 20:10)  HR: 54 (02-12-25 @ 11:34) (54 - 66)  BP: 124/75 (02-12-25 @ 11:34)  RR: 18 (02-12-25 @ 11:34) (18 - 18)  SpO2: 98% (02-12-25 @ 11:34) (96% - 99%)       ==================>> LAB AND IMAGING <<==================                        11.7   7.93  )-----------( 160      ( 12 Feb 2025 07:04 )             37.3        02-12    140  |  103  |  19  ----------------------------<  88  4.2   |  25  |  0.60    Ca    10.2      12 Feb 2025 07:02      WBC count:   7.93 <<== ,  9.69 <<== ,  8.75 <<== ,  7.01 <<==   Hemoglobin:   11.7 <<==,  11.5 <<==,  11.2 <<==,  12.7 <<==  platelets:  160 <==, 173 <==, 172 <==, 173 <==, 171 <==    Creatinine:  0.60  <<==, 0.60  <<==  Sodium:   140  <==, 140  <==       AST:          14(02-04) <== , 13(02-03) <==      ALT:        10(02-04)  <== , 14(02-03)  <==      AP:        98(02-04)  <=, 111(02-03)  <=     Bili:        0.6(02-04)  <=, 0.4(02-03)  <=    ____________________________    M I C R O B I O L O G Y :    Culture - Urine (collected 03 Feb 2025 20:06)  Source: Clean Catch Clean Catch (Midstream)  Final Report (07 Feb 2025 09:37):    >100,000 CFU/ml Escherichia coli    10,000 - 49,000 CFU/mL Proteus mirabilis  Organism: Escherichia coli  Proteus mirabilis (07 Feb 2025 09:37)  Organism: Proteus mirabilis (07 Feb 2025 09:37)    Sensitivities:      Method Type: MARIA M      -  Amoxicillin/Clavulanic Acid: S <=8/4      -  Ampicillin: S <=8 These ampicillin results predict results for amoxicillin      -  Ampicillin/Sulbactam: S <=4/2      -  Aztreonam: S <=4      -  Cefazolin: S <=2 For uncomplicated UTI with K. pneumoniae, E. coli, or P. mirablis: MARIA M <=16 is sensitive and MARIA M >=32 is resistant. This also predicts results for oral agents cefaclor, cefdinir, cefpodoxime, cefprozil, cefuroxime axetil, cephalexin and locarbef for uncomplicated UTI. Note that some isolates may be susceptible to these agents while testing resistant to cefazolin.      -  Cefepime: S <=2      -  Cefoxitin: S <=8      -  Ceftriaxone: S <=1      -  Cefuroxime: S <=4      -  Ciprofloxacin: S <=0.25      -  Ertapenem: S <=0.5      -  Gentamicin: S <=2      -  Levofloxacin: S <=0.5      -  Meropenem: S <=1      -  Nitrofurantoin: R >64 Should not be used to treat pyelonephritis      -  Piperacillin/Tazobactam: S <=8      -  Tobramycin: S <=2      -  Trimethoprim/Sulfamethoxazole: S <=0.5/9.5  Organism: Escherichia coli (07 Feb 2025 09:37)    Sensitivities:      Method Type: MARIA M      -  Amoxicillin/Clavulanic Acid: S <=8/4      -  Ampicillin: R >16 These ampicillin results predict results for amoxicillin      -  Ampicillin/Sulbactam: S 8/4      -  Aztreonam: S <=4      -  Cefazolin: S <=2 For uncomplicated UTI with K. pneumoniae, E. coli, or P. mirablis: MARIA M <=16 is sensitive and MARIA M >=32 is resistant. This also predicts results for oral agents cefaclor, cefdinir, cefpodoxime, cefprozil, cefuroxime axetil, cephalexin and locarbef for uncomplicated UTI. Note that some isolates may be susceptible to these agents while testing resistant to cefazolin.      -  Cefepime: S <=2      -  Cefoxitin: S <=8      -  Ceftriaxone: S <=1      -  Cefuroxime: S <=4      -  Ciprofloxacin: I 0.5      -  Ertapenem: S <=0.5      -  Gentamicin: S <=2      -  Imipenem: S <=1      -  Levofloxacin: S <=0.5      -  Meropenem: S <=1      -  Nitrofurantoin: S <=32 Should not be used to treat pyelonephritis      -  Piperacillin/Tazobactam: S <=8      -  Tobramycin: S <=2      -  Trimethoprim/Sulfamethoxazole: S <=0.5/9.5      < from: MR Pelvis w/wo IV Cont (02.04.25 @ 23:20) >  IMPRESSION:  No vesicovaginal fistula.  < end of copied text >    < from: US Pelvis Complete (02.04.25 @ 12:42) >  IMPRESSION:  1. Very limited transabdominal examination. Findings compatible with air   within the endometrial cavity, endocervical canal and vaginal canal.   Etiology cannot be determined on this examination. Advise clinical   correlation to exclude infection. The uterus and endometrium otherwise   cannot be assessed on this exam.  2. The right ovary is grossly unremarkable.  3. The left ovary was not visualized.  < end of copied text >      < from: CT Abdomen and Pelvis w/ IV Cont (02.03.25 @ 19:14) >  IMPRESSION:  Probable urinary bladder cystitis. Correlate with urinalysis.   Indeterminate right renal lesion measures 1.4 cm on image 52 series 301.   Correlate with contrast-enhanced abdominal MRI for characterization.  Air is noted within the endometrial canal. Endovaginal canal is partially   filled with fluid. These findings are of unclear clinical significance.   GYN evaluation is recommended given the history of vaginal bleeding.  Anterior abdominal wall laxity. Small to moderate size fat-containing   hernia with nodular densities within the hernia and at the hernia   entrance site. Additional right anterior subcutaneous nodular density.   Most likely these findings are related to recent injections. Correlate   clinically. Follow-up CT abdomen pelvis is recommended in 3 months to exclude lesions.  < end of copied text >    ___________________________________________________________________________________  ===============>>  A S S E S S M E N T   A N D   P L A N <<===============  ------------------------------------------------------------------------------------------    patient is a 74  year old woman, AAOX3, from home, wheelchair-bound and uses cane occasionally , w/ PMHx COPD on 3 L NC at home, Obesity, Hypothyroidism, Diastolic HF previously on Lasix, Afib on Eliquis, Breast cancer s/p surgery, Asthma, Chronic lymphedema, Kidney cancer s/p partial nephrectomy, anxiety, presenting to the ER  with blood in diaper when changed by HHA. appeared as dark red blood in diaper x 3 today. appears to be coming from vaginal area, not rectum. states increased urination yesterday. also reports chest pain started yesterday. nonradiating ( upon further questioning patient reports pain all over the body).  pelvic exam done by ER doctor limited exaxm, vaginal vault visualized in speculum, ?scant blood > CT scan ordered, pending  Upon further discussed with pt's son who is at bedside.. patient lives at home with , who has been gradually becoming more forgetful and falling a lot himself, and unable to care for himself or for he patient ( as the primary care taker for many years)...   pt's son Alexander offered fot them both to move in with him but they refused and patient and  have also been refusing to go to see their doctor and kept cancelling their appointments..  at this [place] patient and son in agreement that patient and  ( also being admitted) would need to be placed together into a long term care center..        Problem/Plan - 1:  ·  Problem: Problem related to living arrangement.   ·  Plan: as above  social work evaluation for placement for patient and    supportive care.     Problem/Plan - 2:  ·  Problem: vaginal bleeding.   GYN appreciated   Cancer Antigen, 125 WNL  imaging as above   need close outpatient follow up     Problem/Plan - 3:  ·  Problem: CAD (coronary artery disease).   ·  Plan: Continue home medications and monitor.  cardio following      Problem/Plan - 4:  ·  Problem: Functional hemiparesis.   ·  Plan: patient chronically bed / WC bound, can't really ambulate : total care  bed > chair and peter to chair as able  turn and position / pressure ulcer precautions.     Problem/Plan - 5:  ·  Problem: COPD  stable   Continue home medications and monitor.  O2 NC  supportive care  monitor.     Problem/Plan - 6:  ·  Problem: Chronic atrial fibrillation.   ·  Plan: Eliquis  Continue Current medications otherwise and monitor.   cardio appreciated      Problem/Plan - 7:  ·  Problem: Lymphedema.   ·  Plan: previously on diuretics > not on med list now!    > elevation, increase activity as able  will consider ACE wrapping  diuretics as needed.    -GI/DVT Prophylaxis per protocol.    pending placement     --------------------------------------------  Case discussed with patient, Nurse Practitioner   Education given on findings and plan of care  ___________________________  H. JANICE Welch.  Pager: [phone]    
  _________________________________________________________________________________________  ========>>  M E D I C A L   A T T E N D I N G    F O L L O W  U P  N O T E  <<=========  -----------------------------------------------------------------------------------------------------    - Patient seen and examined by me earlier today.   - Patient today overall doing ok, comfortable, eating OK.     ==================>> REVIEW OF SYSTEM <<=================    GEN: no fever, no chills, no pain  RESP: no SOB, no cough, no sputum  CVS: no chest pain, no palpitations  GI: no abdominal pain, no nausea  : no dysuria, no frequency reported.. no more bleeding reported   Neuro: no headache, no dizziness    ==================>> PHYSICAL EXAM <<=================    GEN: A&O X 3 , NAD , comfortable, pleasant   HEENT: NCAT, PERRL, MMM, hearing intact  CVS: S1S2 , regular , No M/R/G appreciated  PULM: CTA B/L,  no W/R/R appreciated  ABD.: soft. non tender, non distended,  obese   Extrem: intact pulses , chronic edema and stasis changes / ulcers.. dressed        ( Note written / Date of service 02-06-25 ( This is certified to be the same as "ENTERED" date above ( for billing purposes)))    ==================>> MEDICATIONS <<====================    acetaminophen   IVPB .. 1000 milliGRAM(s) IV Intermittent once  amLODIPine   Tablet 5 milliGRAM(s) Oral daily  atorvastatin 40 milliGRAM(s) Oral at bedtime  buDESOnide    Inhalation Suspension 0.5 milliGRAM(s) Inhalation every 12 hours  busPIRone 10 milliGRAM(s) Oral <User Schedule>  DULoxetine 60 milliGRAM(s) Oral daily  gabapentin 100 milliGRAM(s) Oral three times a day  hydrALAZINE 75 milliGRAM(s) Oral two times a day  levothyroxine 125 MICROGram(s) Oral daily  metoprolol tartrate 25 milliGRAM(s) Oral two times a day  montelukast 10 milliGRAM(s) Oral daily  multivitamin 1 Tablet(s) Oral daily  nystatin/triamcinolone Cream 1 Application(s) Topical two times a day  pantoprazole  Injectable 40 milliGRAM(s) IV Push daily  sacubitril 24 mG/valsartan 26 mG 1 Tablet(s) Oral two times a day  tiotropium 2.5 MICROgram(s) Inhaler 2 Puff(s) Inhalation daily  zinc oxide 20% Ointment 1 Application(s) Topical two times a day    MEDICATIONS  (PRN):  acetaminophen     Tablet .. 650 milliGRAM(s) Oral every 6 hours PRN Temp greater or equal to 38C (100.4F), Mild Pain (1 - 3)  aluminum hydroxide/magnesium hydroxide/simethicone Suspension 30 milliLiter(s) Oral every 4 hours PRN Dyspepsia  melatonin 3 milliGRAM(s) Oral at bedtime PRN Insomnia  ondansetron Injectable 4 milliGRAM(s) IV Push every 8 hours PRN Nausea and/or Vomiting  traMADol 50 milliGRAM(s) Oral two times a day PRN Moderate Pain (4 - 6)    ___________  Active diet:  Diet, Regular:   DASH/TLC Sodium & Cholesterol Restricted (DASH)  ___________________    ==================>> VITAL SIGNS <<==================    Height (cm): 157.5  Weight (kg): 90.7  BMI (kg/m2): 36.6  Vital Signs Last 24 HrsT(C): 36.6 (02-06-25 @ 12:00)  T(F): 97.8 (02-06-25 @ 12:00), Max: 98.2 (02-05-25 @ 21:41)  HR: 57 (02-06-25 @ 12:00) (57 - 66)  BP: 131/84 (02-06-25 @ 12:00)  RR: 18 (02-06-25 @ 12:00) (18 - 18)  SpO2: 95% (02-06-25 @ 12:00) (95% - 96%)       ==================>> LAB AND IMAGING <<==================       no labs today     WBC count:   6.92 <<== ,  8.78 <<==   Hemoglobin:   11.1 <<==,  12.3 <<==  platelets:  176 <==, 208 <==    Creatinine:  0.70  <<==, 0.71  <<==  Sodium:   141  <==, 141  <==       AST:          14(02-04) <== , 13(02-03) <==      ALT:        10(02-04)  <== , 14(02-03)  <==      AP:        98(02-04)  <=, 111(02-03)  <=     Bili:        0.6(02-04)  <=, 0.4(02-03)  <=    ____________________________    M I C R O B I O L O G Y :    Culture - Urine (collected 03 Feb 2025 20:06)  Source: Clean Catch Clean Catch (Midstream)  Preliminary Report (05 Feb 2025 00:19):    >100,000 CFU/ml Escherichia coli  Organism: Escherichia coli (06 Feb 2025 09:11)  Organism: Escherichia coli (06 Feb 2025 09:11)    Sensitivities:      Method Type: MARIA M      -  Amoxicillin/Clavulanic Acid: S <=8/4      -  Ampicillin: R >16 These ampicillin results predict results for amoxicillin      -  Ampicillin/Sulbactam: S 8/4      -  Aztreonam: S <=4      -  Cefazolin: S <=2 For uncomplicated UTI with K. pneumoniae, E. coli, or P. mirablis: MARIA M <=16 is sensitive and MARIA M >=32 is resistant. This also predicts results for oral agents cefaclor, cefdinir, cefpodoxime, cefprozil, cefuroxime axetil, cephalexin and locarbef for uncomplicated UTI. Note that some isolates may be susceptible to these agents while testing resistant to cefazolin.      -  Cefepime: S <=2      -  Cefoxitin: S <=8      -  Ceftriaxone: S <=1      -  Cefuroxime: S <=4      -  Ciprofloxacin: I 0.5      -  Ertapenem: S <=0.5      -  Gentamicin: S <=2      -  Imipenem: S <=1      -  Levofloxacin: S <=0.5      -  Meropenem: S <=1      -  Nitrofurantoin: S <=32 Should not be used to treat pyelonephritis      -  Piperacillin/Tazobactam: S <=8      -  Tobramycin: S <=2      -  Trimethoprim/Sulfamethoxazole: S <=0.5/9.5      < from: MR Pelvis w/wo IV Cont (02.04.25 @ 23:20) >  IMPRESSION:  No vesicovaginal fistula.  < end of copied text >    < from: US Pelvis Complete (02.04.25 @ 12:42) >  IMPRESSION:  1. Very limited transabdominal examination. Findings compatible with air   within the endometrial cavity, endocervical canal and vaginal canal.   Etiology cannot be determined on this examination. Advise clinical   correlation to exclude infection. The uterus and endometrium otherwise   cannot be assessed on this exam.  2. The right ovary is grossly unremarkable.  3. The left ovary was not visualized.  < end of copied text >      < from: CT Abdomen and Pelvis w/ IV Cont (02.03.25 @ 19:14) >  IMPRESSION:  Probable urinary bladder cystitis. Correlate with urinalysis.   Indeterminate right renal lesion measures 1.4 cm on image 52 series 301.   Correlate with contrast-enhanced abdominal MRI for characterization.  Air is noted within the endometrial canal. Endovaginal canal is partially   filled with fluid. These findings are of unclear clinical significance.   GYN evaluation is recommended given the history of vaginal bleeding.  Anterior abdominal wall laxity. Small to moderate size fat-containing   hernia with nodular densities within the hernia and at the hernia   entrance site. Additional right anterior subcutaneous nodular density.   Most likely these findings are related to recent injections. Correlate   clinically. Follow-up CT abdomen pelvis is recommended in 3 months to exclude lesions.  < end of copied text >    ___________________________________________________________________________________  ===============>>  A S S E S S M E N T   A N D   P L A N <<===============  ------------------------------------------------------------------------------------------    patient is a 74  year old woman, AAOX3, from home, wheelchair-bound and uses cane occasionally , w/ PMHx COPD on 3 L NC at home, Obesity, Hypothyroidism, Diastolic HF previously on Lasix, Afib on Eliquis, Breast cancer s/p surgery, Asthma, Chronic lymphedema, Kidney cancer s/p partial nephrectomy, anxiety, presenting to the ER  with blood in diaper when changed by HHA. appeared as dark red blood in diaper x 3 today. appears to be coming from vaginal area, not rectum. states increased urination yesterday. also reports chest pain started yesterday. nonradiating ( upon further questioning patient reports pain all over the body).  pelvic exam done by ER doctor limited exaxm, vaginal vault visualized in speculum, ?scant blood > CT scan ordered, pending  Upon further discussed with pt's son who is at bedside.. patient lives at home with , who has been gradually becoming more forgetful and falling a lot himself, and unable to care for himself or for he patient ( as the primary care taker for many years)...   pt's son Alexander offered fot them both to move in with him but they refused and patient and  have also been refusing to go to see their doctor and kept cancelling their appointments..  at this [place] patient and son in agreement that patient and  ( also being admitted) would need to be placed together into a long term care center..        Problem/Plan - 1:  ·  Problem: Problem related to living arrangement.   ·  Plan: as above  social work evaluation for placement for patient and    supportive care.     Problem/Plan - 2:  ·  Problem: vaginal bleeding.   ·  Plan: CT scan as above with abnormality   evaluation for mass / fibroid or other causes of bleeding  continuing  hold for planned OR biopsy   GYN appreciated >> plan for endometrial Bx in OR      medically stable for planned procedure ..     cardio appreciated as well      bacteriuria, asymptomatic, however given patient going to OR already given antibiotics and treated   Cancer Antigen, 125: (02.04.25 @ 07:32)  imaging as above     ## >> patient also with grossly positive U/A       as above : already treated / sensitive      Problem/Plan - 3:  ·  Problem: Generalized pain.   ·  Plan: chronic > stable / improved   continue Tramadol PRN  chest pain nonspecific   Troponin  :: 14  <<--, 13    Pro-BNP: 283 (02-03-25 @ 15:22)  cardio on board..      Problem/Plan - 4:  ·  Problem: CAD (coronary artery disease).   ·  Plan: Continue home medications and monitor.  cardio following      Problem/Plan - 5:  ·  Problem: Functional hemiparesis.   ·  Plan: patient chronically bed / WC bound, can't really ambulate : total care  bed > chair and peter to chair as able  turn and position / pressure ulcer precautions.     Problem/Plan - 6:  ·  Problem: COPD, severe.   ·  Plan: stable now  Continue home medications and monitor.  O2 NC  supportive care  monitor.     Problem/Plan - 7:  ·  Problem: Chronic atrial fibrillation.   ·  Plan: Eliquis  Continue Current medications otherwise and monitor.   cardio appreciated      Problem/Plan - 8:  ·  Problem: Lymphedema.   ·  Plan: previously on diuretics > not on med list now!    > elevation, increase activity as able  will consider ACE wrapping  diuretics as needed.    -GI/DVT Prophylaxis per protocol.    Estimate Discharge  2/7/25  --------------------------------------------  Case discussed with patient,   Education given on findings and plan of care  ___________________________  H. JANICE Welch.  Pager: [phone]    
  _________________________________________________________________________________________  ========>>  M E D I C A L   A T T E N D I N G    F O L L O W  U P  N O T E  <<=========  -----------------------------------------------------------------------------------------------------    - Patient seen and examined by me earlier today.   - In summary,  SHEKHAR VASQUEZ is a 74y year old woman admitted with vag bleed  - Patient today overall doing ok, comfortable, eating OK.     ==================>> REVIEW OF SYSTEM <<=================    GEN: no fever, no chills, no pain  RESP: no SOB, no cough, no sputum  CVS: no chest pain, no palpitations  GI: no abdominal pain, no nausea  : no dysuria, no frequency reported.. no more bleeding reported   Neuro: no headache, no dizziness    ==================>> PHYSICAL EXAM <<=================    GEN: A&O X 3 , NAD , comfortable, pleasant, a little nervous > reassured   HEENT: NCAT, PERRL, MMM, hearing intact  CVS: S1S2 , regular , No M/R/G appreciated  PULM: CTA B/L,  no W/R/R appreciated  ABD.: soft. non tender, non distended,  obese   Extrem: intact pulses , chronic edema and stasis changes / ulcers.. dressed           ( Note written / Date of service 02-05-25 ( This is certified to be the same as "ENTERED" date above ( for billing purposes)))    ==================>> MEDICATIONS <<====================    acetaminophen   IVPB .. 1000 milliGRAM(s) IV Intermittent once  amLODIPine   Tablet 5 milliGRAM(s) Oral daily  apixaban 5 milliGRAM(s) Oral every 12 hours  atorvastatin 40 milliGRAM(s) Oral at bedtime  buDESOnide    Inhalation Suspension 0.5 milliGRAM(s) Inhalation every 12 hours  busPIRone 10 milliGRAM(s) Oral <User Schedule>  cefTRIAXone   IVPB 1000 milliGRAM(s) IV Intermittent every 24 hours  DULoxetine 60 milliGRAM(s) Oral daily  gabapentin 100 milliGRAM(s) Oral three times a day  hydrALAZINE 75 milliGRAM(s) Oral two times a day  levothyroxine 125 MICROGram(s) Oral daily  metoprolol tartrate 25 milliGRAM(s) Oral two times a day  montelukast 10 milliGRAM(s) Oral daily  multivitamin 1 Tablet(s) Oral daily  nystatin/triamcinolone Cream 1 Application(s) Topical two times a day  pantoprazole  Injectable 40 milliGRAM(s) IV Push daily  sacubitril 24 mG/valsartan 26 mG 1 Tablet(s) Oral two times a day  tiotropium 2.5 MICROgram(s) Inhaler 2 Puff(s) Inhalation daily  zinc oxide 20% Ointment 1 Application(s) Topical two times a day    MEDICATIONS  (PRN):  acetaminophen     Tablet .. 650 milliGRAM(s) Oral every 6 hours PRN Temp greater or equal to 38C (100.4F), Mild Pain (1 - 3)  aluminum hydroxide/magnesium hydroxide/simethicone Suspension 30 milliLiter(s) Oral every 4 hours PRN Dyspepsia  melatonin 3 milliGRAM(s) Oral at bedtime PRN Insomnia  ondansetron Injectable 4 milliGRAM(s) IV Push every 8 hours PRN Nausea and/or Vomiting  traMADol 50 milliGRAM(s) Oral two times a day PRN Moderate Pain (4 - 6)    ___________  Active diet:  Diet, Regular:   DASH/TLC Sodium & Cholesterol Restricted (DASH)  ___________________    ==================>> VITAL SIGNS <<==================     Height (cm): 157.5  Weight (kg): 90.7  BMI (kg/m2): 36.6  Vital Signs Last 24 HrsT(C): 36.6 (02-05-25 @ 04:28)  T(F): 97.8 (02-05-25 @ 04:28), Max: 98.2 (02-04-25 @ 20:20)  HR: 61 (02-05-25 @ 04:28) (61 - 79)  BP: 129/72 (02-05-25 @ 04:28)  RR: 18 (02-05-25 @ 04:28) (17 - 18)  SpO2: 95% (02-05-25 @ 04:28) (95% - 97%)       ==================>> LAB AND IMAGING <<==================                        11.1   6.92  )-----------( 176      ( 04 Feb 2025 07:32 )             36.1        02-04    141  |  104  |  16  ----------------------------<  69[L]  3.9   |  24  |  0.70    Ca    10.0      04 Feb 2025 07:32    TPro  6.4  /  Alb  3.4  /  TBili  0.6  /  DBili  x   /  AST  14  /  ALT  10  /  AlkPhos  98  02-04       Urinalysis:  02-03-25 @ 20:06  Leuk. Est.: Moderate  Nirite: Positive  WBC: 43  Blood: Large     salt Crystal: --     calcium crystal: --     Bili: Negative     cast: 9  color: Orange  Bacteria: Too Numerous to count  Epith. cell: 2  Yeast: --     Ketone: Negative     Protein: Trace     Glucose: Negative     sperm: --     Spec.Gravity: 1.019    Lipid profile:  (02-04-25)     Total: 145     LDL  : (p)     HDL  :42     TG   :93     HgA1C:   (02-04-25)          (02-04-25)      5.4    ____________________________    M I C R O B I O L O G Y :    Culture - Urine (collected 03 Feb 2025 20:06)  Source: Clean Catch Clean Catch (Midstream)  Preliminary Report (05 Feb 2025 00:19):    >100,000 CFU/ml Escherichia coli    < from: MR Pelvis w/wo IV Cont (02.04.25 @ 23:20) >  IMPRESSION:  No vesicovaginal fistula.  < end of copied text >    < from: US Pelvis Complete (02.04.25 @ 12:42) >  IMPRESSION:  1. Very limited transabdominal examination. Findings compatible with air   within the endometrial cavity, endocervical canal and vaginal canal.   Etiology cannot be determined on this examination. Advise clinical   correlation to exclude infection. The uterus and endometrium otherwise   cannot be assessed on this exam.  2. The right ovary is grossly unremarkable.  3. The left ovary was not visualized.  < end of copied text >      < from: CT Abdomen and Pelvis w/ IV Cont (02.03.25 @ 19:14) >  IMPRESSION:  Probable urinary bladder cystitis. Correlate with urinalysis.   Indeterminate right renal lesion measures 1.4 cm on image 52 series 301.   Correlate with contrast-enhanced abdominal MRI for characterization.  Air is noted within the endometrial canal. Endovaginal canal is partially   filled with fluid. These findings are of unclear clinical significance.   GYN evaluation is recommended given the history of vaginal bleeding.  Anterior abdominal wall laxity. Small to moderate size fat-containing   hernia with nodular densities within the hernia and at the hernia   entrance site. Additional right anterior subcutaneous nodular density.   Most likely these findings are related to recent injections. Correlate   clinically. Follow-up CT abdomen pelvis is recommended in 3 months to exclude lesions.  < end of copied text >    ___________________________________________________________________________________  ===============>>  A S S E S S M E N T   A N D   P L A N <<===============  ------------------------------------------------------------------------------------------    patient is a 74  year old woman, AAOX3, from home, wheelchair-bound and uses cane occasionally , w/ PMHx COPD on 3 L NC at home, Obesity, Hypothyroidism, Diastolic HF previously on Lasix, Afib on Eliquis, Breast cancer s/p surgery, Asthma, Chronic lymphedema, Kidney cancer s/p partial nephrectomy, anxiety, presenting to the ER  with blood in diaper when changed by HHA. appeared as dark red blood in diaper x 3 today. appears to be coming from vaginal area, not rectum. states increased urination yesterday. also reports chest pain started yesterday. nonradiating ( upon further questioning patient reports pain all over the body).  pelvic exam done by ER doctor limited exaxm, vaginal vault visualized in speculum, ?scant blood > CT scan ordered, pending  Upon further discussed with pt's son who is at bedside.. patient lives at home with , who has been gradually becoming more forgetful and falling a lot himself, and unable to care for himself or for he patient ( as the primary care taker for many years)...   pt's son Alexander offered fot them both to move in with him but they refused and patient and  have also been refusing to go to see their doctor and kept cancelling their appointments..  at this [place] patient and son in agreement that patient and  ( also being admitted) would need to be placed together into a long term care center..        Problem/Plan - 1:  ·  Problem: Problem related to living arrangement.   ·  Plan: as above  social work evaluation for placement for patient and    supportive care.     Problem/Plan - 2:  ·  Problem: vaginal bleeding.   ·  Plan: CT scan as above with abnormality   evaluation for mass / fibroid or other causes of bleeding  continuing  hold for planned OR biopsy   GYN appreciated >> plan for endometrial Bx in OR      medically stable for planned procedure ..      cardio appreciated as well   Cancer Antigen, 125: (02.04.25 @ 07:32)  imaging as above     ## >> patient also with grossly positive U/A       >> started on antibiotics for UTI > follow cultures      Problem/Plan - 3:  ·  Problem: Generalized pain.   ·  Plan: chronic > stable / improved   continue Tramadol PRN  chest pain nonspecific   Troponin  :: 14  <<--, 13    Pro-BNP: 283 (02-03-25 @ 15:22)  cardio on board..      Problem/Plan - 4:  ·  Problem: CAD (coronary artery disease).   ·  Plan: Continue home medications and monitor.  cardio following      Problem/Plan - 5:  ·  Problem: Functional hemiparesis.   ·  Plan: patient chronically bed / WC bound, can't really ambulate : total care  bed > chair and peter to chair as able  turn and position / pressure ulcer precautions.     Problem/Plan - 6:  ·  Problem: COPD, severe.   ·  Plan: stable now  Continue home medications and monitor.  O2 NC  supportive care  monitor.     Problem/Plan - 7:  ·  Problem: Chronic atrial fibrillation.   ·  Plan: Eliquis  Continue Current medications otherwise and monitor.   cardio appreciated      Problem/Plan - 8:  ·  Problem: Lymphedema.   ·  Plan: previously on diuretics > not on med list now!    > elevation, increase activity as able  will consider ACE wrapping  diuretics as needed.    -GI/DVT Prophylaxis per protocol.    Estimate Discharge  2/7/25  --------------------------------------------  Case discussed with patient,   Education given on findings and plan of care  ___________________________  HRenetta Welch D.O.  Pager: [phone]    
  _________________________________________________________________________________________  ========>>  M E D I C A L   A T T E N D I N G    F O L L O W  U P  N O T E  <<=========  -----------------------------------------------------------------------------------------------------    - Patient seen and examined by me earlier today.   - Patient today overall doing ok, comfortable  ..     ==================>> REVIEW OF SYSTEM <<=================    GEN: no fever, no chills, no pain  RESP: no SOB, no cough, no sputum  CVS: no chest pain, no palpitations  GI: no abdominal pain, no nausea  : no dysuria, no frequency reported.. no more bleeding reported   Neuro: no headache, no dizziness    ==================>> PHYSICAL EXAM <<=================    GEN: A&O X 3 , NAD , comfortable, pleasant   HEENT: NCAT, PERRL, MMM, hearing intact  CVS: S1S2 , regular , No M/R/G appreciated  PULM: CTA B/L,  no W/R/R appreciated  ABD.: soft. non tender, non distended,  obese   Extrem: intact pulses , chronic edema and stasis changes / ulcers.. dressed        ( Note written / Date of service 02-13-25 ( This is certified to be the same as "ENTERED" date above ( for billing purposes)))    ==================>> MEDICATIONS <<====================    amLODIPine   Tablet 5 milliGRAM(s) Oral daily  apixaban 5 milliGRAM(s) Oral every 12 hours  atorvastatin 40 milliGRAM(s) Oral at bedtime  buDESOnide    Inhalation Suspension 0.5 milliGRAM(s) Inhalation every 12 hours  busPIRone 10 milliGRAM(s) Oral <User Schedule>  DULoxetine 60 milliGRAM(s) Oral daily  gabapentin 100 milliGRAM(s) Oral three times a day  hydrALAZINE 75 milliGRAM(s) Oral two times a day  levothyroxine 125 MICROGram(s) Oral daily  metoprolol tartrate 25 milliGRAM(s) Oral two times a day  montelukast 10 milliGRAM(s) Oral daily  multivitamin 1 Tablet(s) Oral daily  nystatin/triamcinolone Cream 1 Application(s) Topical two times a day  pantoprazole  Injectable 40 milliGRAM(s) IV Push daily  sacubitril 24 mG/valsartan 26 mG 1 Tablet(s) Oral two times a day  tiotropium 2.5 MICROgram(s) Inhaler 2 Puff(s) Inhalation daily  zinc oxide 20% Ointment 1 Application(s) Topical two times a day    MEDICATIONS  (PRN):  acetaminophen     Tablet .. 650 milliGRAM(s) Oral every 6 hours PRN Temp greater or equal to 38C (100.4F), Mild Pain (1 - 3)  aluminum hydroxide/magnesium hydroxide/simethicone Suspension 30 milliLiter(s) Oral every 4 hours PRN Dyspepsia  magnesium hydroxide Suspension 30 milliLiter(s) Oral once PRN Constipation  melatonin 3 milliGRAM(s) Oral at bedtime PRN Insomnia  ondansetron Injectable 4 milliGRAM(s) IV Push every 8 hours PRN Nausea and/or Vomiting  traMADol 50 milliGRAM(s) Oral two times a day PRN Moderate Pain (4 - 6)    ___________  Active diet:  Diet, Regular:   DASH/TLC Sodium & Cholesterol Restricted (DASH)  Tin(7 Gm Arginine/7 Gm Glut/1.2 Gm HMB     Qty per Day:  1  Liquid Protein Supplement     Qty per Day:  1  ___________________    ==================>> VITAL SIGNS <<==================    Vital Signs Last 24 HrsT(C): 36.3 (02-13-25 @ 07:32)  T(F): 97.4 (02-13-25 @ 07:32), Max: 98.4 (02-12-25 @ 21:01)  HR: 67 (02-13-25 @ 07:32) (59 - 74)  BP: 103/49 (02-13-25 @ 07:32)  RR: 18 (02-13-25 @ 07:32) (18 - 18)  SpO2: 95% (02-13-25 @ 07:32) (95% - 99%)       ==================>> LAB AND IMAGING <<==================                        11.7   7.93  )-----------( 160      ( 12 Feb 2025 07:04 )             37.3        02-13    141  |  104  |  15  ----------------------------<  94  3.9   |  27  |  0.53    Ca    10.3      13 Feb 2025 08:49      WBC count:   7.93 <<== ,  9.69 <<== ,  8.75 <<==   Hemoglobin:   11.7 <<==,  11.5 <<==,  11.2 <<==  platelets:  160 <==, 173 <==, 172 <==, 173 <==    Creatinine:  0.53  <<==, 0.60  <<==  Sodium:   141  <==, 140  <==       AST:          14(02-04) <== , 13(02-03) <==      ALT:        10(02-04)  <== , 14(02-03)  <==      AP:        98(02-04)  <=, 111(02-03)  <=     Bili:        0.6(02-04)  <=, 0.4(02-03)  <=    ____________________________    M I C R O B I O L O G Y :    Culture - Urine (collected 03 Feb 2025 20:06)  Source: Clean Catch Clean Catch (Midstream)  Final Report (07 Feb 2025 09:37):    >100,000 CFU/ml Escherichia coli    10,000 - 49,000 CFU/mL Proteus mirabilis  Organism: Escherichia coli  Proteus mirabilis (07 Feb 2025 09:37)  Organism: Proteus mirabilis (07 Feb 2025 09:37)    Sensitivities:      Method Type: MARIA M      -  Amoxicillin/Clavulanic Acid: S <=8/4      -  Ampicillin: S <=8 These ampicillin results predict results for amoxicillin      -  Ampicillin/Sulbactam: S <=4/2      -  Aztreonam: S <=4      -  Cefazolin: S <=2 For uncomplicated UTI with K. pneumoniae, E. coli, or P. mirablis: MARIA M <=16 is sensitive and MARIA M >=32 is resistant. This also predicts results for oral agents cefaclor, cefdinir, cefpodoxime, cefprozil, cefuroxime axetil, cephalexin and locarbef for uncomplicated UTI. Note that some isolates may be susceptible to these agents while testing resistant to cefazolin.      -  Cefepime: S <=2      -  Cefoxitin: S <=8      -  Ceftriaxone: S <=1      -  Cefuroxime: S <=4      -  Ciprofloxacin: S <=0.25      -  Ertapenem: S <=0.5      -  Gentamicin: S <=2      -  Levofloxacin: S <=0.5      -  Meropenem: S <=1      -  Nitrofurantoin: R >64 Should not be used to treat pyelonephritis      -  Piperacillin/Tazobactam: S <=8      -  Tobramycin: S <=2      -  Trimethoprim/Sulfamethoxazole: S <=0.5/9.5  Organism: Escherichia coli (07 Feb 2025 09:37)    Sensitivities:      Method Type: MARIA M      -  Amoxicillin/Clavulanic Acid: S <=8/4      -  Ampicillin: R >16 These ampicillin results predict results for amoxicillin      -  Ampicillin/Sulbactam: S 8/4      -  Aztreonam: S <=4      -  Cefazolin: S <=2 For uncomplicated UTI with K. pneumoniae, E. coli, or P. mirablis: MARIA M <=16 is sensitive and MARIA M >=32 is resistant. This also predicts results for oral agents cefaclor, cefdinir, cefpodoxime, cefprozil, cefuroxime axetil, cephalexin and locarbef for uncomplicated UTI. Note that some isolates may be susceptible to these agents while testing resistant to cefazolin.      -  Cefepime: S <=2      -  Cefoxitin: S <=8      -  Ceftriaxone: S <=1      -  Cefuroxime: S <=4      -  Ciprofloxacin: I 0.5      -  Ertapenem: S <=0.5      -  Gentamicin: S <=2      -  Imipenem: S <=1      -  Levofloxacin: S <=0.5      -  Meropenem: S <=1      -  Nitrofurantoin: S <=32 Should not be used to treat pyelonephritis      -  Piperacillin/Tazobactam: S <=8      -  Tobramycin: S <=2      -  Trimethoprim/Sulfamethoxazole: S <=0.5/9.5            < from: MR Pelvis w/wo IV Cont (02.04.25 @ 23:20) >  IMPRESSION:  No vesicovaginal fistula.  < end of copied text >    < from: US Pelvis Complete (02.04.25 @ 12:42) >  IMPRESSION:  1. Very limited transabdominal examination. Findings compatible with air   within the endometrial cavity, endocervical canal and vaginal canal.   Etiology cannot be determined on this examination. Advise clinical   correlation to exclude infection. The uterus and endometrium otherwise   cannot be assessed on this exam.  2. The right ovary is grossly unremarkable.  3. The left ovary was not visualized.  < end of copied text >      < from: CT Abdomen and Pelvis w/ IV Cont (02.03.25 @ 19:14) >  IMPRESSION:  Probable urinary bladder cystitis. Correlate with urinalysis.   Indeterminate right renal lesion measures 1.4 cm on image 52 series 301.   Correlate with contrast-enhanced abdominal MRI for characterization.  Air is noted within the endometrial canal. Endovaginal canal is partially   filled with fluid. These findings are of unclear clinical significance.   GYN evaluation is recommended given the history of vaginal bleeding.  Anterior abdominal wall laxity. Small to moderate size fat-containing   hernia with nodular densities within the hernia and at the hernia   entrance site. Additional right anterior subcutaneous nodular density.   Most likely these findings are related to recent injections. Correlate   clinically. Follow-up CT abdomen pelvis is recommended in 3 months to exclude lesions.  < end of copied text >    ___________________________________________________________________________________  ===============>>  A S S E S S M E N T   A N D   P L A N <<===============  ------------------------------------------------------------------------------------------    patient is a 74  year old woman, AAOX3, from home, wheelchair-bound and uses cane occasionally , w/ PMHx COPD on 3 L NC at home, Obesity, Hypothyroidism, Diastolic HF previously on Lasix, Afib on Eliquis, Breast cancer s/p surgery, Asthma, Chronic lymphedema, Kidney cancer s/p partial nephrectomy, anxiety, presenting to the ER  with blood in diaper when changed by HHA. appeared as dark red blood in diaper x 3 today. appears to be coming from vaginal area, not rectum. states increased urination yesterday. also reports chest pain started yesterday. nonradiating ( upon further questioning patient reports pain all over the body).  pelvic exam done by ER doctor limited exaxm, vaginal vault visualized in speculum, ?scant blood > CT scan ordered, pending  Upon further discussed with pt's son who is at bedside.. patient lives at home with , who has been gradually becoming more forgetful and falling a lot himself, and unable to care for himself or for he patient ( as the primary care taker for many years)...   pt's son Alexander offered fot them both to move in with him but they refused and patient and  have also been refusing to go to see their doctor and kept cancelling their appointments..  at this pont patient and son in agreement that patient and  ( also being admitted) would need to be placed together into a long term care center..        Problem/Plan - 1:  ·  Problem: Problem related to living arrangement.   ·  Plan: as above  social work evaluation for placement for patient and    supportive care.     Problem/Plan - 2:  ·  Problem: vaginal bleeding.   GYN appreciated   Cancer Antigen, 125 WNL  imaging as above   need close outpatient follow up and surveillance imaging..  ? reattempt Bx     Problem/Plan - 3:  ·  Problem: CAD (coronary artery disease).   ·  Plan: Continue home medications and monitor.  cardio following      Problem/Plan - 4:  ·  Problem: Functional hemiparesis.   ·  Plan: patient chronically bed / WC bound, can't really ambulate : total care  bed > chair and peter to chair as able  turn and position / pressure ulcer precautions.     Problem/Plan - 5:  ·  Problem: COPD  stable   Continue home medications and monitor.  O2 NC  supportive care  monitor.     Problem/Plan - 6:  ·  Problem: Chronic atrial fibrillation.   ·  Plan: Eliquis  Continue Current medications otherwise and monitor.   cardio appreciated      Problem/Plan - 7:  ·  Problem: Lymphedema.   ·  Plan: previously on diuretics > not on med list now!    > elevation, increase activity as able  will consider ACE wrapping  diuretics as needed.    -GI/DVT Prophylaxis per protocol.    pending placement     --------------------------------------------  Case discussed with patient, Nurse Practitioner ,    Education given on findings and plan of care  ___________________________  HRenetta Welch D.O.  Pager: [phone]    
  _________________________________________________________________________________________  ========>>  M E D I C A L   A T T E N D I N G    F O L L O W  U P  N O T E  <<=========  -----------------------------------------------------------------------------------------------------    - Patient seen and examined by me earlier today.   - Patient today overall doing ok, comfortable  .. wants to discuss further with GYN    ==================>> REVIEW OF SYSTEM <<=================    GEN: no fever, no chills, no pain  RESP: no SOB, no cough, no sputum  CVS: no chest pain, no palpitations  GI: no abdominal pain, no nausea  : no dysuria, no frequency reported.. no more bleeding reported   Neuro: no headache, no dizziness    ==================>> PHYSICAL EXAM <<=================    GEN: A&O X 3 , NAD , comfortable, pleasant   HEENT: NCAT, PERRL, MMM, hearing intact  CVS: S1S2 , regular , No M/R/G appreciated  PULM: CTA B/L,  no W/R/R appreciated  ABD.: soft. non tender, non distended,  obese   Extrem: intact pulses , chronic edema and stasis changes / ulcers.. dressed        ( Note written / Date of service 02-11-25 ( This is certified to be the same as "ENTERED" date above ( for billing purposes)))    ==================>> MEDICATIONS <<====================    amLODIPine   Tablet 5 milliGRAM(s) Oral daily  apixaban 5 milliGRAM(s) Oral every 12 hours  atorvastatin 40 milliGRAM(s) Oral at bedtime  buDESOnide    Inhalation Suspension 0.5 milliGRAM(s) Inhalation every 12 hours  busPIRone 10 milliGRAM(s) Oral <User Schedule>  DULoxetine 60 milliGRAM(s) Oral daily  gabapentin 100 milliGRAM(s) Oral three times a day  hydrALAZINE 75 milliGRAM(s) Oral two times a day  levothyroxine 125 MICROGram(s) Oral daily  metoprolol tartrate 25 milliGRAM(s) Oral two times a day  montelukast 10 milliGRAM(s) Oral daily  multivitamin 1 Tablet(s) Oral daily  nystatin/triamcinolone Cream 1 Application(s) Topical two times a day  pantoprazole  Injectable 40 milliGRAM(s) IV Push daily  sacubitril 24 mG/valsartan 26 mG 1 Tablet(s) Oral two times a day  tiotropium 2.5 MICROgram(s) Inhaler 2 Puff(s) Inhalation daily  zinc oxide 20% Ointment 1 Application(s) Topical two times a day    MEDICATIONS  (PRN):  acetaminophen     Tablet .. 650 milliGRAM(s) Oral every 6 hours PRN Temp greater or equal to 38C (100.4F), Mild Pain (1 - 3)  aluminum hydroxide/magnesium hydroxide/simethicone Suspension 30 milliLiter(s) Oral every 4 hours PRN Dyspepsia  magnesium hydroxide Suspension 30 milliLiter(s) Oral once PRN Constipation  melatonin 3 milliGRAM(s) Oral at bedtime PRN Insomnia  ondansetron Injectable 4 milliGRAM(s) IV Push every 8 hours PRN Nausea and/or Vomiting  traMADol 50 milliGRAM(s) Oral two times a day PRN Moderate Pain (4 - 6)    ___________  Active diet:  Diet, Regular:   DASH/TLC Sodium & Cholesterol Restricted (DASH)  Tin(7 Gm Arginine/7 Gm Glut/1.2 Gm HMB     Qty per Day:  1  Liquid Protein Supplement     Qty per Day:  1  ___________________    ==================>> VITAL SIGNS <<==================    Vital Signs Last 24 HrsT(C): 36.9 (02-11-25 @ 12:04)  T(F): 98.5 (02-11-25 @ 12:04), Max: 98.5 (02-11-25 @ 12:04)  HR: 62 (02-11-25 @ 12:04) (61 - 78)  BP: 135/75 (02-11-25 @ 12:04)  RR: 18 (02-11-25 @ 12:04) (18 - 18)  SpO2: 96% (02-11-25 @ 12:04) (95% - 98%)       ==================>> LAB AND IMAGING <<==================                        11.5   9.69  )-----------( 173      ( 10 Feb 2025 08:22 )             37.4          WBC count:   9.69 <<== ,  8.75 <<== ,  7.01 <<== ,  7.91 <<==   Hemoglobin:   11.5 <<==,  11.2 <<==,  12.7 <<==,  11.4 <<==  platelets:  173 <==, 172 <==, 173 <==, 171 <==       AST:          14(02-04) <== , 13(02-03) <==      ALT:        10(02-04)  <== , 14(02-03)  <==      AP:        98(02-04)  <=, 111(02-03)  <=     Bili:        0.6(02-04)  <=, 0.4(02-03)  <=             < from: MR Pelvis w/wo IV Cont (02.04.25 @ 23:20) >  IMPRESSION:  No vesicovaginal fistula.  < end of copied text >    < from: US Pelvis Complete (02.04.25 @ 12:42) >  IMPRESSION:  1. Very limited transabdominal examination. Findings compatible with air   within the endometrial cavity, endocervical canal and vaginal canal.   Etiology cannot be determined on this examination. Advise clinical   correlation to exclude infection. The uterus and endometrium otherwise   cannot be assessed on this exam.  2. The right ovary is grossly unremarkable.  3. The left ovary was not visualized.  < end of copied text >      < from: CT Abdomen and Pelvis w/ IV Cont (02.03.25 @ 19:14) >  IMPRESSION:  Probable urinary bladder cystitis. Correlate with urinalysis.   Indeterminate right renal lesion measures 1.4 cm on image 52 series 301.   Correlate with contrast-enhanced abdominal MRI for characterization.  Air is noted within the endometrial canal. Endovaginal canal is partially   filled with fluid. These findings are of unclear clinical significance.   GYN evaluation is recommended given the history of vaginal bleeding.  Anterior abdominal wall laxity. Small to moderate size fat-containing   hernia with nodular densities within the hernia and at the hernia   entrance site. Additional right anterior subcutaneous nodular density.   Most likely these findings are related to recent injections. Correlate   clinically. Follow-up CT abdomen pelvis is recommended in 3 months to exclude lesions.  < end of copied text >    ___________________________________________________________________________________  ===============>>  A S S E S S M E N T   A N D   P L A N <<===============  ------------------------------------------------------------------------------------------    patient is a 74  year old woman, AAOX3, from home, wheelchair-bound and uses cane occasionally , w/ PMHx COPD on 3 L NC at home, Obesity, Hypothyroidism, Diastolic HF previously on Lasix, Afib on Eliquis, Breast cancer s/p surgery, Asthma, Chronic lymphedema, Kidney cancer s/p partial nephrectomy, anxiety, presenting to the ER  with blood in diaper when changed by HHA. appeared as dark red blood in diaper x 3 today. appears to be coming from vaginal area, not rectum. states increased urination yesterday. also reports chest pain started yesterday. nonradiating ( upon further questioning patient reports pain all over the body).  pelvic exam done by ER doctor limited exaxm, vaginal vault visualized in speculum, ?scant blood > CT scan ordered, pending  Upon further discussed with pt's son who is at bedside.. patient lives at home with , who has been gradually becoming more forgetful and falling a lot himself, and unable to care for himself or for he patient ( as the primary care taker for many years)...   pt's son Alexander offered fot them both to move in with him but they refused and patient and  have also been refusing to go to see their doctor and kept cancelling their appointments..  at this pont patient and son in agreement that patient and  ( also being admitted) would need to be placed together into a long term care center..        Problem/Plan - 1:  ·  Problem: Problem related to living arrangement.   ·  Plan: as above  social work evaluation for placement for patient and    supportive care.     Problem/Plan - 2:  ·  Problem: vaginal bleeding.   GYN appreciated   Cancer Antigen, 125 WNL  imaging as above   need close outpatient follow up     Problem/Plan - 3:  ·  Problem: CAD (coronary artery disease).   ·  Plan: Continue home medications and monitor.  cardio following      Problem/Plan - 4:  ·  Problem: Functional hemiparesis.   ·  Plan: patient chronically bed / WC bound, can't really ambulate : total care  bed > chair and peetr to chair as able  turn and position / pressure ulcer precautions.     Problem/Plan - 5:  ·  Problem: COPD, severe.   ·  Plan: stable now  Continue home medications and monitor.  O2 NC  supportive care  monitor.     Problem/Plan - 6:  ·  Problem: Chronic atrial fibrillation.   ·  Plan: Eliquis  Continue Current medications otherwise and monitor.   cardio appreciated      Problem/Plan - 7:  ·  Problem: Lymphedema.   ·  Plan: previously on diuretics > not on med list now!    > elevation, increase activity as able  will consider ACE wrapping  diuretics as needed.    -GI/DVT Prophylaxis per protocol.    pending above and placement to rehabilitation ( though patient prefers to go home with her aids!) :  follow up     --------------------------------------------  Case discussed with patient, Nurse Practitioner   Education given on findings and plan of care  ___________________________  H. JANICE Welch.  Pager: [phone]    
A/P: 74y POD#1 sp EUA with remainder of procedure aborted due to limited mobility of patient (See brief op and previous noted). Patient stable from post-operative perspective.     - Diet and fluid orders as per primary team  - Ambulation as tolerated, anticoagulation as per primary team  - Recommend oral analgesics, to be ordered per primary team  - Cont care w primary team  - Pt to follow up with US:  Pelvic sono q 3 mos for one year then q 6 mos x 1 year and q year    Alicia Rangel, PGY-2

## 2025-02-13 NOTE — DISCHARGE NOTE PROVIDER - HOSPITAL COURSE
HPI:  patient is a 74  year old woman, AAOX3, from home, wheelchair-bound and uses cane occasionally , w/ PMHx COPD on 3 L NC at home, Obesity, Hypothyroidism, Diastolic HF previously on Lasix, Afib on Eliquis, Breast cancer s/p surgery, Asthma, Chronic lymphedema, Kidney cancer s/p partial nephrectomy, anxiety, presenting to the ER  with blood in diaper when changed by HHA. appeared as dark red blood in diaper x 3 today. appears to be coming from vaginal area, not rectum. states increased urination yesterday. also reports chest pain started yesterday. nonradiating ( upon further questioning patient reports pain all over the body).  pelvic exam done by ER doctor limited exaxm, vaginal vault visualized in speculum, ?scant blood > CT scan ordered, pending  Upon further discussed with pt's son who is at bedside.. patient lives at home with , who has been gradually becoming more forgetful and falling a lot himself, and unable to care for himself or for he patient ( as the primary care taker for many years)...   pt's son Alexander offered fot them both to move in with him but they refused and patient and  have also been refusing to go to see their doctor and kept cancelling their appointments..  at this pont patient and son in agreement that patient and  ( also being admitted) would need to be placed together into a long term care center..    (03 Feb 2025 19:56)    Hospital Course:  blem: Problem related to living arrangement.   ·  Plan: as above  social work evaluation for placement for patient and    supportive care.     Problem/Plan - 2:  ·  Problem: vaginal bleeding.   GYN appreciated   Cancer Antigen, 125 WNL  imaging as above   need close outpatient follow up and surveillance imaging..  ? reattempt Bx     Problem/Plan - 3:  ·  Problem: CAD (coronary artery disease).   ·  Plan: Continue home medications and monitor.  cardio following      Problem/Plan - 4:  ·  Problem: Functional hemiparesis.   ·  Plan: patient chronically bed / WC bound, can't really ambulate : total care  bed > chair and peter to chair as able  turn and position / pressure ulcer precautions.     Problem/Plan - 5:  ·  Problem: COPD  stable   Continue home medications and monitor.  O2 NC  supportive care  monitor.     Problem/Plan - 6:  ·  Problem: Chronic atrial fibrillation.   ·  Plan: Eliquis  Continue Current medications otherwise and monitor.   cardio appreciated      Problem/Plan - 7:  ·  Problem: Lymphedema.   ·  Plan: previously on diuretics > not on med list now!    > elevation, increase activity as able  will consider ACE wrapping  diuretics as needed.      Important Medication Changes and Reason:    Active or Pending Issues Requiring Follow-up:    Advanced Directives:   [x ] Full code  [ ] DNR  [ ] Hospice    Discharge Diagnoses:         HPI:  patient is a 74  year old woman, AAOX3, from home, wheelchair-bound and uses cane occasionally , w/ PMHx COPD on 3 L NC at home, Obesity, Hypothyroidism, Diastolic HF previously on Lasix, Afib on Eliquis, Breast cancer s/p surgery, Asthma, Chronic lymphedema, Kidney cancer s/p partial nephrectomy, anxiety, presenting to the ER  with blood in diaper when changed by HHA. appeared as dark red blood in diaper x 3 today. appears to be coming from vaginal area, not rectum. states increased urination yesterday. also reports chest pain started yesterday. nonradiating ( upon further questioning patient reports pain all over the body).  pelvic exam done by ER doctor limited exaxm, vaginal vault visualized in speculum, ?scant blood > CT scan ordered, pending  Upon further discussed with pt's son who is at bedside.. patient lives at home with , who has been gradually becoming more forgetful and falling a lot himself, and unable to care for himself or for he patient ( as the primary care taker for many years)...   pt's son Alexander offered fot them both to move in with him but they refused and patient and  have also been refusing to go to see their doctor and kept cancelling their appointments..  at this pont patient and son in agreement that patient and  ( also being admitted) would need to be placed together into a long term care center..    (03 Feb 2025 19:56)    Hospital Course:  This patient was admitted with several medical and social issues. A primary concern was her living situation, prompting a social work evaluation for placement options for both the patient and her . She also presented with vaginal bleeding. A gynecology consult was obtained, and her Cancer Antigen 125 (CA-125) was within normal limits. CT scan performed showing air and fluid in endometrial cavity. Pelvic US then performed for better characterization but only transabdominal performed, which was very limited due to habitus. MRI performed for better characterization which showed no air in the endometrial cavity and a thin 2mm endometrial lining. OB/Gyn attempted exam under anesthesia that was limited by patients immobility, cervix unable to be localized, and biopsy not performed. Given reassuring MRI finding and inability to obtain endometrial biopsy, plan for interval ultrasounds for monitoring q3-6 months and the plan is for close outpatient follow-up, surveillance imaging, and consideration of a repeat biopsy.    The patient’s coronary artery disease (CAD) and chronic atrial fibrillation were managed medically. She will continue her home medications for CAD with cardiology follow-up. She is on Eliquis for atrial fibrillation and will continue this and her other cardiac medications with ongoing monitoring. Her COPD was stable throughout her admission. She received supplemental oxygen via nasal cannula, supportive care, and will continue home medications with monitoring.    Her functional hemiparesis requires total care. She is chronically bedbound and wheelchair-bound, with transfers done via a Colin lift as tolerated. Nursing staff provided turning and positioning as well as pressure ulcer precautions. Finally, her lymphedema, previously treated with diuretics (which are not currently listed on her medication list), will be managed with elevation, increased activity as tolerated, possible ACE wrapping, and diuretics as needed.      Important Medication Changes and Reason:  started Gabapentin , Spiriva     Active or Pending Issues Requiring Follow-up:  f/u with OB/GYN, cards   Advanced Directives:   [x ] Full code  [ ] DNR  [ ] Hospice    Discharge Diagnoses:  Vaginal bleeding  CAD  Chronic atrial fibrillation  COPD  Functional hemiparesis  Lymphedema

## 2025-02-13 NOTE — DISCHARGE NOTE PROVIDER - NSDCFUADDAPPT_GEN_ALL_CORE_FT
APPTS ARE READY TO BE MADE: [X] YES    Best Family or Patient Contact (if needed):    Additional Information about above appointments (if needed):    1: pcp  2: ob/gyn  3:     Other comments or requests:

## 2025-02-13 NOTE — DISCHARGE NOTE PROVIDER - CARE PROVIDER_API CALL
Juan Salcedo  Cardiovascular Disease  [address] [place] [id number]  Phone: [phone]  Fax: [phone]  Follow Up Time: 2 weeks

## 2025-02-13 NOTE — PROGRESS NOTE ADULT - SUBJECTIVE AND OBJECTIVE BOX
date of service: 02-08-25 @ 12:25  [facility]  REVIEW OF SYSTEMS:  CONSTITUTIONAL: No fever,  no  weight loss  ENT:  No  tinnitus,   no   vertigo  NECK: No pain or stiffness  RESPIRATORY: No cough, wheezing, chills or hemoptysis;    No Shortness of Breath  CARDIOVASCULAR: No chest pain, palpitations, dizziness  GASTROINTESTINAL: No abdominal or epigastric pain. No nausea, vomiting, or hematemesis; No diarrhea  No melena or hematochezia.  GENITOURINARY: No dysuria, frequency, hematuria, or incontinence  NEUROLOGICAL: No headaches  SKIN: No itching,  no   rash  LYMPH Nodes: No enlarged glands  ENDOCRINE: No heat or cold intolerance  MUSCULOSKELETAL: No joint pain or swelling  PSYCHIATRIC: No depression, anxiety  HEME/LYMPH: No easy bruising, or bleeding gums  ALLERGY AND IMMUNOLOGIC: No hives or eczema	    MEDICATIONS  (STANDING):  amLODIPine   Tablet 5 milliGRAM(s) Oral daily  atorvastatin 40 milliGRAM(s) Oral at bedtime  buDESOnide    Inhalation Suspension 0.5 milliGRAM(s) Inhalation every 12 hours  busPIRone 10 milliGRAM(s) Oral <User Schedule>  DULoxetine 60 milliGRAM(s) Oral daily  gabapentin 100 milliGRAM(s) Oral three times a day  hydrALAZINE 75 milliGRAM(s) Oral two times a day  levothyroxine 125 MICROGram(s) Oral daily  metoprolol tartrate 25 milliGRAM(s) Oral two times a day  montelukast 10 milliGRAM(s) Oral daily  multivitamin 1 Tablet(s) Oral daily  nystatin/triamcinolone Cream 1 Application(s) Topical two times a day  pantoprazole  Injectable 40 milliGRAM(s) IV Push daily  sacubitril 24 mG/valsartan 26 mG 1 Tablet(s) Oral two times a day  tiotropium 2.5 MICROgram(s) Inhaler 2 Puff(s) Inhalation daily  zinc oxide 20% Ointment 1 Application(s) Topical two times a day    MEDICATIONS  (PRN):  acetaminophen     Tablet .. 650 milliGRAM(s) Oral every 6 hours PRN Temp greater or equal to 38C (100.4F), Mild Pain (1 - 3)  aluminum hydroxide/magnesium hydroxide/simethicone Suspension 30 milliLiter(s) Oral every 4 hours PRN Dyspepsia  melatonin 3 milliGRAM(s) Oral at bedtime PRN Insomnia  ondansetron Injectable 4 milliGRAM(s) IV Push every 8 hours PRN Nausea and/or Vomiting  traMADol 50 milliGRAM(s) Oral two times a day PRN Moderate Pain (4 - 6)      Vital Signs Last 24 Hrs  T(C): 36.6 (08 Feb 2025 11:37), Max: 37.3 (07 Feb 2025 17:35)  T(F): 97.8 (08 Feb 2025 11:37), Max: 99.1 (07 Feb 2025 17:35)  HR: 64 (08 Feb 2025 11:37) (63 - 87)  BP: 137/81 (08 Feb 2025 11:37) (137/81 - 178/74)  BP(mean): 116 (07 Feb 2025 18:35) (102 - 120)  RR: 18 (08 Feb 2025 11:37) (17 - 18)  SpO2: 93% (08 Feb 2025 11:37) (92% - 98%)    Parameters below as of 08 Feb 2025 11:37  Patient On (Oxygen Delivery Method): nasal cannula  O2 Flow (L/min): 2    CAPILLARY BLOOD GLUCOSE        I&O's Summary    07 Feb 2025 07:01  -  08 Feb 2025 07:00  --------------------------------------------------------  IN: 0 mL / OUT: 480 mL / NET: -480 mL    08 Feb 2025 07:01  -  08 Feb 2025 12:25  --------------------------------------------------------  IN: 210 mL / OUT: 200 mL / NET: 10 mL          Appearance: Normal	  HEENT:   Normal oral mucosa, PERRL, EOMI	  Lymphatic: No lymphadenopathy  Cardiovascular: Normal S1 S2, No JVD  Respiratory: Lungs clear to auscultation	  Gastrointestinal:  Soft, Non-tender, + BS	  Skin: No rash, No ecchymoses	  Extremities:     LABS:                        12.7   7.01  )-----------( 173      ( 08 Feb 2025 07:13 )             40.9     02-07    140  |  104  |  18  ----------------------------<  89  3.9   |  26  |  0.60    Ca    9.8      07 Feb 2025 06:50      PT/INR - ( 07 Feb 2025 06:50 )   PT: 12.7 sec;   INR: 1.12 ratio               Urinalysis Basic - ( 07 Feb 2025 06:50 )    Color: x / Appearance: x / SG: x / pH: x  Gluc: 89 mg/dL / Ketone: x  / Bili: x / Urobili: x   Blood: x / Protein: x / Nitrite: x   Leuk Esterase: x / RBC: x / WBC x   Sq Epi: x / Non Sq Epi: x / Bacteria: x                      Consultant(s) Notes Reviewed:      Care Discussed with Consultants/Other Providers:    
Cardiovascular Disease Progress Note    Overnight events: No acute events overnight.  no new cardiac sx  Otherwise review of systems negative    Objective Findings:  T(C): 36.6 (02-08-25 @ 04:54), Max: 37.3 (02-07-25 @ 17:35)  HR: 63 (02-08-25 @ 04:54) (62 - 87)  BP: 159/84 (02-08-25 @ 04:54) (138/64 - 178/74)  RR: 18 (02-08-25 @ 04:54) (17 - 18)  SpO2: 94% (02-08-25 @ 04:54) (92% - 98%)  Wt(kg): --  Daily Height in cm: 157.48 (07 Feb 2025 16:08)    Daily       Physical Exam:  Gen: NAD  HEENT: EOMI  CV: RRR, normal S1 + S2, no m/r/g  Lungs: CTAB  Abd: soft, non-tender  Ext: No edema    Telemetry:    Laboratory Data:                        12.7   7.01  )-----------( 173      ( 08 Feb 2025 07:13 )             40.9     02-07    140  |  104  |  18  ----------------------------<  89  3.9   |  26  |  0.60    Ca    9.8      07 Feb 2025 06:50      PT/INR - ( 07 Feb 2025 06:50 )   PT: 12.7 sec;   INR: 1.12 ratio                   Inpatient Medications:  MEDICATIONS  (STANDING):  amLODIPine   Tablet 5 milliGRAM(s) Oral daily  atorvastatin 40 milliGRAM(s) Oral at bedtime  buDESOnide    Inhalation Suspension 0.5 milliGRAM(s) Inhalation every 12 hours  busPIRone 10 milliGRAM(s) Oral <User Schedule>  DULoxetine 60 milliGRAM(s) Oral daily  gabapentin 100 milliGRAM(s) Oral three times a day  hydrALAZINE 75 milliGRAM(s) Oral two times a day  levothyroxine 125 MICROGram(s) Oral daily  metoprolol tartrate 25 milliGRAM(s) Oral two times a day  montelukast 10 milliGRAM(s) Oral daily  multivitamin 1 Tablet(s) Oral daily  nystatin/triamcinolone Cream 1 Application(s) Topical two times a day  pantoprazole  Injectable 40 milliGRAM(s) IV Push daily  sacubitril 24 mG/valsartan 26 mG 1 Tablet(s) Oral two times a day  tiotropium 2.5 MICROgram(s) Inhaler 2 Puff(s) Inhalation daily  zinc oxide 20% Ointment 1 Application(s) Topical two times a day      Assessment:  74  year old woman, AAOX3, from home, wheelchair-bound and uses cane occasionally , w/ PMHx COPD on 3 L NC at home, Obesity, Hypothyroidism, Diastolic HF previously on Lasix, Afib on Eliquis, Breast cancer s/p surgery, Asthma, Chronic lymphedema, Kidney cancer s/p partial nephrectomy, anxiety, presenting to the ER  with blood in diaper when changed by HHA. appeared as dark red blood in diaper x 3 today. appears to be coming from vaginal area, not rectum. states increased urination yesterday. also reports chest pain started yesterday. nonradiating     Recs:  cardiac stable  no e/o acs  atypical cp. can defer ischemic eval at this time  cw gdmt for lv dysfunction  cw rate control meds for paf. ac on hold = resume when able  diuretics prn to maintain euvolemic  gyn consulted for vaginal bleeding. embx unable to be performed = plan for serial sonos  will follow        Over 25 minutes spent on total encounter; more than 50% of the visit was spent counseling and/or coordinating care by the attending physician.      Juan Salcedo MD   Cardiovascular Disease  [phone]
Cardiovascular Disease Progress Note    Overnight events: No acute events overnight.  no new cardiac sx  Otherwise review of systems negative    Objective Findings:  T(C): 36.3 (02-12-25 @ 05:32), Max: 37 (02-11-25 @ 20:10)  HR: 58 (02-12-25 @ 05:32) (58 - 66)  BP: 124/63 (02-12-25 @ 05:32) (105/62 - 141/76)  RR: 18 (02-12-25 @ 05:32) (18 - 18)  SpO2: 99% (02-12-25 @ 05:32) (96% - 99%)  Wt(kg): --  Daily     Daily       Physical Exam:  Gen: NAD  HEENT: EOMI  CV: RRR, normal S1 + S2, no m/r/g  Lungs: CTAB  Abd: soft, non-tender  Ext: No edema    Telemetry:    Laboratory Data:                        11.5   9.69  )-----------( 173      ( 10 Feb 2025 08:22 )             37.4                     Inpatient Medications:  MEDICATIONS  (STANDING):  amLODIPine   Tablet 5 milliGRAM(s) Oral daily  apixaban 5 milliGRAM(s) Oral every 12 hours  atorvastatin 40 milliGRAM(s) Oral at bedtime  buDESOnide    Inhalation Suspension 0.5 milliGRAM(s) Inhalation every 12 hours  busPIRone 10 milliGRAM(s) Oral <User Schedule>  DULoxetine 60 milliGRAM(s) Oral daily  gabapentin 100 milliGRAM(s) Oral three times a day  hydrALAZINE 75 milliGRAM(s) Oral two times a day  levothyroxine 125 MICROGram(s) Oral daily  metoprolol tartrate 25 milliGRAM(s) Oral two times a day  montelukast 10 milliGRAM(s) Oral daily  multivitamin 1 Tablet(s) Oral daily  nystatin/triamcinolone Cream 1 Application(s) Topical two times a day  pantoprazole  Injectable 40 milliGRAM(s) IV Push daily  sacubitril 24 mG/valsartan 26 mG 1 Tablet(s) Oral two times a day  tiotropium 2.5 MICROgram(s) Inhaler 2 Puff(s) Inhalation daily  zinc oxide 20% Ointment 1 Application(s) Topical two times a day      Assessment:  74  year old woman, AAOX3, from home, wheelchair-bound and uses cane occasionally , w/ PMHx COPD on 3 L NC at home, Obesity, Hypothyroidism, Diastolic HF previously on Lasix, Afib on Eliquis, Breast cancer s/p surgery, Asthma, Chronic lymphedema, Kidney cancer s/p partial nephrectomy, anxiety, presenting to the ER  with blood in diaper when changed by HHA. appeared as dark red blood in diaper x 3 today. appears to be coming from vaginal area, not rectum. states increased urination yesterday. also reports chest pain started yesterday. nonradiating     Recs:  cardiac stable  no e/o acs  atypical cp. can defer ischemic eval at this time  cw gdmt for lv dysfunction  cw rate control meds for paf  diuretics prn to maintain euvolemic  gyn consulted for vaginal bleeding. embx unable to be performed = further workup as op with serial imaging +/- attempt at repeat exam  will follow          Over 25 minutes spent on total encounter; more than 50% of the visit was spent counseling and/or coordinating care by the attending physician.      Juan Salcedo MD   Cardiovascular Disease  [phone]
Cardiovascular Disease Progress Note    Overnight events: No acute events overnight.  no new cardiac sx  Otherwise review of systems negative    Objective Findings:  T(C): 36.3 (02-06-25 @ 04:58), Max: 36.8 (02-05-25 @ 21:41)  HR: 58 (02-06-25 @ 04:58) (58 - 66)  BP: 138/78 (02-06-25 @ 04:58) (127/71 - 146/94)  RR: 18 (02-06-25 @ 04:58) (18 - 18)  SpO2: 96% (02-06-25 @ 04:58) (93% - 96%)  Wt(kg): --  Daily     Daily       Physical Exam:  Gen: NAD  HEENT: EOMI  CV: RRR, normal S1 + S2, no m/r/g  Lungs: CTAB  Abd: soft, non-tender  Ext: No edema    Telemetry:    Laboratory Data:                    Inpatient Medications:  MEDICATIONS  (STANDING):  acetaminophen   IVPB .. 1000 milliGRAM(s) IV Intermittent once  amLODIPine   Tablet 5 milliGRAM(s) Oral daily  atorvastatin 40 milliGRAM(s) Oral at bedtime  buDESOnide    Inhalation Suspension 0.5 milliGRAM(s) Inhalation every 12 hours  busPIRone 10 milliGRAM(s) Oral <User Schedule>  cefTRIAXone   IVPB 1000 milliGRAM(s) IV Intermittent every 24 hours  DULoxetine 60 milliGRAM(s) Oral daily  gabapentin 100 milliGRAM(s) Oral three times a day  hydrALAZINE 75 milliGRAM(s) Oral two times a day  levothyroxine 125 MICROGram(s) Oral daily  metoprolol tartrate 25 milliGRAM(s) Oral two times a day  montelukast 10 milliGRAM(s) Oral daily  multivitamin 1 Tablet(s) Oral daily  nystatin/triamcinolone Cream 1 Application(s) Topical two times a day  pantoprazole  Injectable 40 milliGRAM(s) IV Push daily  sacubitril 24 mG/valsartan 26 mG 1 Tablet(s) Oral two times a day  tiotropium 2.5 MICROgram(s) Inhaler 2 Puff(s) Inhalation daily  zinc oxide 20% Ointment 1 Application(s) Topical two times a day      Assessment:  74  year old woman, AAOX3, from home, wheelchair-bound and uses cane occasionally , w/ PMHx COPD on 3 L NC at home, Obesity, Hypothyroidism, Diastolic HF previously on Lasix, Afib on Eliquis, Breast cancer s/p surgery, Asthma, Chronic lymphedema, Kidney cancer s/p partial nephrectomy, anxiety, presenting to the ER  with blood in diaper when changed by HHA. appeared as dark red blood in diaper x 3 today. appears to be coming from vaginal area, not rectum. states increased urination yesterday. also reports chest pain started yesterday. nonradiating     Recs:  cardiac stable  no e/o acs  atypical cp. can defer ischemic eval at this time  cw gdmt for lv dysfunction  cw rate control meds for paf. ac on hold = resume when able  diuretics prn  gyn consulted for vaginal bleeding. can proceed to embx without further cardiac workup  will follow          Over 25 minutes spent on total encounter; more than 50% of the visit was spent counseling and/or coordinating care by the attending physician.      Juan Salcedo MD   Cardiovascular Disease  [phone]
Cardiovascular Disease Progress Note    Overnight events: No acute events overnight.  no new cardiac sx  Otherwise review of systems negative    Objective Findings:  T(C): 36.3 (02-13-25 @ 04:24), Max: 36.9 (02-12-25 @ 21:01)  HR: 59 (02-13-25 @ 04:24) (54 - 74)  BP: 137/71 (02-13-25 @ 04:24) (117/51 - 137/71)  RR: 18 (02-12-25 @ 21:01) (18 - 18)  SpO2: 99% (02-13-25 @ 04:24) (95% - 99%)  Wt(kg): --  Daily     Daily       Physical Exam:  Gen: NAD  HEENT: EOMI  CV: RRR, normal S1 + S2, no m/r/g  Lungs: CTAB  Abd: soft, non-tender  Ext: No edema    Telemetry:    Laboratory Data:                        11.7   7.93  )-----------( 160      ( 12 Feb 2025 07:04 )             37.3     02-12    140  |  103  |  19  ----------------------------<  88  4.2   |  25  |  0.60    Ca    10.2      12 Feb 2025 07:02                Inpatient Medications:  MEDICATIONS  (STANDING):  amLODIPine   Tablet 5 milliGRAM(s) Oral daily  apixaban 5 milliGRAM(s) Oral every 12 hours  atorvastatin 40 milliGRAM(s) Oral at bedtime  buDESOnide    Inhalation Suspension 0.5 milliGRAM(s) Inhalation every 12 hours  busPIRone 10 milliGRAM(s) Oral <User Schedule>  DULoxetine 60 milliGRAM(s) Oral daily  gabapentin 100 milliGRAM(s) Oral three times a day  hydrALAZINE 75 milliGRAM(s) Oral two times a day  levothyroxine 125 MICROGram(s) Oral daily  metoprolol tartrate 25 milliGRAM(s) Oral two times a day  montelukast 10 milliGRAM(s) Oral daily  multivitamin 1 Tablet(s) Oral daily  nystatin/triamcinolone Cream 1 Application(s) Topical two times a day  pantoprazole  Injectable 40 milliGRAM(s) IV Push daily  sacubitril 24 mG/valsartan 26 mG 1 Tablet(s) Oral two times a day  tiotropium 2.5 MICROgram(s) Inhaler 2 Puff(s) Inhalation daily  zinc oxide 20% Ointment 1 Application(s) Topical two times a day      Assessment:  74  year old woman, AAOX3, from home, wheelchair-bound and uses cane occasionally , w/ PMHx COPD on 3 L NC at home, Obesity, Hypothyroidism, Diastolic HF previously on Lasix, Afib on Eliquis, Breast cancer s/p surgery, Asthma, Chronic lymphedema, Kidney cancer s/p partial nephrectomy, anxiety, presenting to the ER  with blood in diaper when changed by HHA. appeared as dark red blood in diaper x 3 today. appears to be coming from vaginal area, not rectum. states increased urination yesterday. also reports chest pain started yesterday. nonradiating     Recs:  cardiac stable  no e/o acs  atypical cp. can defer ischemic eval at this time  cw gdmt for lv dysfunction  cw rate control meds for paf  diuretics prn to maintain euvolemic  gyn consulted for vaginal bleeding. embx unable to be performed = further workup as op with serial imaging +/- attempt at repeat exam  will follow            Over 25 minutes spent on total encounter; more than 50% of the visit was spent counseling and/or coordinating care by the attending physician.      Juan Salcedo MD   Cardiovascular Disease  [phone]
Cardiovascular Disease Progress Note    Overnight events: No acute events overnight.  no new cardiac sx  Otherwise review of systems negative    Objective Findings:  T(C): 36.4 (02-10-25 @ 04:56), Max: 36.9 (02-09-25 @ 14:22)  HR: 59 (02-10-25 @ 04:56) (59 - 79)  BP: 147/81 (02-10-25 @ 04:56) (121/61 - 147/81)  RR: 18 (02-10-25 @ 04:56) (18 - 18)  SpO2: 98% (02-10-25 @ 04:56) (92% - 98%)  Wt(kg): --  Daily     Daily       Physical Exam:  Gen: NAD  HEENT: EOMI  CV: RRR, normal S1 + S2, no m/r/g  Lungs: CTAB  Abd: soft, non-tender  Ext: No edema    Telemetry:    Laboratory Data:                        11.2   8.75  )-----------( 172      ( 09 Feb 2025 07:12 )             36.9                     Inpatient Medications:  MEDICATIONS  (STANDING):  amLODIPine   Tablet 5 milliGRAM(s) Oral daily  apixaban 5 milliGRAM(s) Oral every 12 hours  atorvastatin 40 milliGRAM(s) Oral at bedtime  buDESOnide    Inhalation Suspension 0.5 milliGRAM(s) Inhalation every 12 hours  busPIRone 10 milliGRAM(s) Oral <User Schedule>  DULoxetine 60 milliGRAM(s) Oral daily  gabapentin 100 milliGRAM(s) Oral three times a day  hydrALAZINE 75 milliGRAM(s) Oral two times a day  levothyroxine 125 MICROGram(s) Oral daily  metoprolol tartrate 25 milliGRAM(s) Oral two times a day  montelukast 10 milliGRAM(s) Oral daily  multivitamin 1 Tablet(s) Oral daily  nystatin/triamcinolone Cream 1 Application(s) Topical two times a day  pantoprazole  Injectable 40 milliGRAM(s) IV Push daily  sacubitril 24 mG/valsartan 26 mG 1 Tablet(s) Oral two times a day  tiotropium 2.5 MICROgram(s) Inhaler 2 Puff(s) Inhalation daily  zinc oxide 20% Ointment 1 Application(s) Topical two times a day      Assessment:  74  year old woman, AAOX3, from home, wheelchair-bound and uses cane occasionally , w/ PMHx COPD on 3 L NC at home, Obesity, Hypothyroidism, Diastolic HF previously on Lasix, Afib on Eliquis, Breast cancer s/p surgery, Asthma, Chronic lymphedema, Kidney cancer s/p partial nephrectomy, anxiety, presenting to the ER  with blood in diaper when changed by HHA. appeared as dark red blood in diaper x 3 today. appears to be coming from vaginal area, not rectum. states increased urination yesterday. also reports chest pain started yesterday. nonradiating     Recs:  cardiac stable  no e/o acs  atypical cp. can defer ischemic eval at this time  cw gdmt for lv dysfunction  cw rate control meds for paf. ac on hold = resume when able  diuretics prn to maintain euvolemic  gyn consulted for vaginal bleeding. embx unable to be performed = further recs per gyn  will follow          Over 25 minutes spent on total encounter; more than 50% of the visit was spent counseling and/or coordinating care by the attending physician.      Juan Salcedo MD   Cardiovascular Disease  [phone]
Cardiovascular Disease Progress Note    Overnight events: No acute events overnight.  no new cardiac sx  Otherwise review of systems negative    Objective Findings:  T(C): 36.5 (02-11-25 @ 05:05), Max: 36.6 (02-10-25 @ 12:01)  HR: 61 (02-11-25 @ 05:05) (61 - 78)  BP: 141/70 (02-11-25 @ 05:05) (111/66 - 141/70)  RR: 18 (02-11-25 @ 05:05) (18 - 18)  SpO2: 98% (02-11-25 @ 05:05) (94% - 98%)  Wt(kg): --  Daily     Daily       Physical Exam:  Gen: NAD  HEENT: EOMI  CV: RRR, normal S1 + S2, no m/r/g  Lungs: CTAB  Abd: soft, non-tender  Ext: No edema    Telemetry:    Laboratory Data:                        11.5   9.69  )-----------( 173      ( 10 Feb 2025 08:22 )             37.4                     Inpatient Medications:  MEDICATIONS  (STANDING):  amLODIPine   Tablet 5 milliGRAM(s) Oral daily  apixaban 5 milliGRAM(s) Oral every 12 hours  atorvastatin 40 milliGRAM(s) Oral at bedtime  buDESOnide    Inhalation Suspension 0.5 milliGRAM(s) Inhalation every 12 hours  busPIRone 10 milliGRAM(s) Oral <User Schedule>  DULoxetine 60 milliGRAM(s) Oral daily  gabapentin 100 milliGRAM(s) Oral three times a day  glycerin Suppository - Adult 1 Suppository(s) Rectal once  hydrALAZINE 75 milliGRAM(s) Oral two times a day  levothyroxine 125 MICROGram(s) Oral daily  metoprolol tartrate 25 milliGRAM(s) Oral two times a day  montelukast 10 milliGRAM(s) Oral daily  multivitamin 1 Tablet(s) Oral daily  nystatin/triamcinolone Cream 1 Application(s) Topical two times a day  pantoprazole  Injectable 40 milliGRAM(s) IV Push daily  sacubitril 24 mG/valsartan 26 mG 1 Tablet(s) Oral two times a day  senna 2 Tablet(s) Oral once  tiotropium 2.5 MICROgram(s) Inhaler 2 Puff(s) Inhalation daily  zinc oxide 20% Ointment 1 Application(s) Topical two times a day      Assessment:  74  year old woman, AAOX3, from home, wheelchair-bound and uses cane occasionally , w/ PMHx COPD on 3 L NC at home, Obesity, Hypothyroidism, Diastolic HF previously on Lasix, Afib on Eliquis, Breast cancer s/p surgery, Asthma, Chronic lymphedema, Kidney cancer s/p partial nephrectomy, anxiety, presenting to the ER  with blood in diaper when changed by HHA. appeared as dark red blood in diaper x 3 today. appears to be coming from vaginal area, not rectum. states increased urination yesterday. also reports chest pain started yesterday. nonradiating     Recs:  cardiac stable  no e/o acs  atypical cp. can defer ischemic eval at this time  cw gdmt for lv dysfunction  cw rate control meds for paf  diuretics prn to maintain euvolemic  gyn consulted for vaginal bleeding. embx unable to be performed = patient requesting to be re-evaluated  will follow          Over 25 minutes spent on total encounter; more than 50% of the visit was spent counseling and/or coordinating care by the attending physician.      Juan Salcedo MD   Cardiovascular Disease  [phone]
Cardiovascular Disease Progress Note    Overnight events: No acute events overnight.  no new cardiac sx  Otherwise review of systems negative    Objective Findings:  T(C): 36.6 (02-05-25 @ 04:28), Max: 36.8 (02-04-25 @ 20:20)  HR: 61 (02-05-25 @ 04:28) (61 - 79)  BP: 129/72 (02-05-25 @ 04:28) (124/76 - 129/72)  RR: 18 (02-05-25 @ 04:28) (17 - 18)  SpO2: 95% (02-05-25 @ 04:28) (95% - 97%)  Wt(kg): --  Daily     Daily       Physical Exam:  Gen: NAD  HEENT: EOMI  CV: RRR, normal S1 + S2, no m/r/g  Lungs: CTAB  Abd: soft, non-tender  Ext: No edema    Telemetry:    Laboratory Data:                        11.1   6.92  )-----------( 176      ( 04 Feb 2025 07:32 )             36.1     02-04    141  |  104  |  16  ----------------------------<  69[L]  3.9   |  24  |  0.70    Ca    10.0      04 Feb 2025 07:32    TPro  6.4  /  Alb  3.4  /  TBili  0.6  /  DBili  x   /  AST  14  /  ALT  10  /  AlkPhos  98  02-04    PT/INR - ( 03 Feb 2025 15:22 )   PT: 13.9 sec;   INR: 1.21 ratio         PTT - ( 03 Feb 2025 15:22 )  PTT:97.7 sec          Inpatient Medications:  MEDICATIONS  (STANDING):  acetaminophen   IVPB .. 1000 milliGRAM(s) IV Intermittent once  amLODIPine   Tablet 5 milliGRAM(s) Oral daily  apixaban 5 milliGRAM(s) Oral every 12 hours  atorvastatin 40 milliGRAM(s) Oral at bedtime  buDESOnide    Inhalation Suspension 0.5 milliGRAM(s) Inhalation every 12 hours  busPIRone 10 milliGRAM(s) Oral <User Schedule>  cefTRIAXone   IVPB 1000 milliGRAM(s) IV Intermittent every 24 hours  DULoxetine 60 milliGRAM(s) Oral daily  gabapentin 100 milliGRAM(s) Oral three times a day  hydrALAZINE 75 milliGRAM(s) Oral two times a day  levothyroxine 125 MICROGram(s) Oral daily  metoprolol tartrate 25 milliGRAM(s) Oral two times a day  montelukast 10 milliGRAM(s) Oral daily  multivitamin 1 Tablet(s) Oral daily  nystatin/triamcinolone Cream 1 Application(s) Topical two times a day  pantoprazole  Injectable 40 milliGRAM(s) IV Push daily  sacubitril 24 mG/valsartan 26 mG 1 Tablet(s) Oral two times a day  tiotropium 2.5 MICROgram(s) Inhaler 2 Puff(s) Inhalation daily  zinc oxide 20% Ointment 1 Application(s) Topical two times a day      Assessment:  74  year old woman, AAOX3, from home, wheelchair-bound and uses cane occasionally , w/ PMHx COPD on 3 L NC at home, Obesity, Hypothyroidism, Diastolic HF previously on Lasix, Afib on Eliquis, Breast cancer s/p surgery, Asthma, Chronic lymphedema, Kidney cancer s/p partial nephrectomy, anxiety, presenting to the ER  with blood in diaper when changed by HHA. appeared as dark red blood in diaper x 3 today. appears to be coming from vaginal area, not rectum. states increased urination yesterday. also reports chest pain started yesterday. nonradiating     Recs:  cardiac stable  no e/o acs  atypical cp. can defer ischemic eval at this time  cw gdmt for lv dysfunction  cw ac and rate control meds for paf  diuretics prn  gyn consulted for vaginal bleeding while on ac. can proceed to embx without further cardiac bc; no obj to holding eliquis as needed  will follow        Over 25 minutes spent on total encounter; more than 50% of the visit was spent counseling and/or coordinating care by the attending physician.      Juan Salcedo MD   Cardiovascular Disease  [phone]
Cardiovascular Disease Progress Note    Overnight events: No acute events overnight.  no new cardiac sx  Otherwise review of systems negative    Objective Findings:  T(C): 36.7 (02-07-25 @ 04:54), Max: 36.7 (02-06-25 @ 17:14)  HR: 60 (02-07-25 @ 04:54) (57 - 64)  BP: 126/68 (02-07-25 @ 04:54) (104/65 - 131/84)  RR: 18 (02-07-25 @ 04:54) (18 - 18)  SpO2: 93% (02-07-25 @ 04:54) (93% - 95%)  Wt(kg): --  Daily     Daily       Physical Exam:  Gen: NAD  HEENT: EOMI  CV: RRR, normal S1 + S2, no m/r/g  Lungs: CTAB  Abd: soft, non-tender  Ext: No edema    Telemetry:    Laboratory Data:                        11.4   7.91  )-----------( 171      ( 07 Feb 2025 06:50 )             36.6           PT/INR - ( 07 Feb 2025 06:50 )   PT: 12.7 sec;   INR: 1.12 ratio                   Inpatient Medications:  MEDICATIONS  (STANDING):  acetaminophen   IVPB .. 1000 milliGRAM(s) IV Intermittent once  amLODIPine   Tablet 5 milliGRAM(s) Oral daily  atorvastatin 40 milliGRAM(s) Oral at bedtime  buDESOnide    Inhalation Suspension 0.5 milliGRAM(s) Inhalation every 12 hours  busPIRone 10 milliGRAM(s) Oral <User Schedule>  DULoxetine 60 milliGRAM(s) Oral daily  gabapentin 100 milliGRAM(s) Oral three times a day  hydrALAZINE 75 milliGRAM(s) Oral two times a day  levothyroxine 125 MICROGram(s) Oral daily  metoprolol tartrate 25 milliGRAM(s) Oral two times a day  montelukast 10 milliGRAM(s) Oral daily  multivitamin 1 Tablet(s) Oral daily  nystatin/triamcinolone Cream 1 Application(s) Topical two times a day  pantoprazole  Injectable 40 milliGRAM(s) IV Push daily  sacubitril 24 mG/valsartan 26 mG 1 Tablet(s) Oral two times a day  tiotropium 2.5 MICROgram(s) Inhaler 2 Puff(s) Inhalation daily  zinc oxide 20% Ointment 1 Application(s) Topical two times a day      Assessment:  74  year old woman, AAOX3, from home, wheelchair-bound and uses cane occasionally , w/ PMHx COPD on 3 L NC at home, Obesity, Hypothyroidism, Diastolic HF previously on Lasix, Afib on Eliquis, Breast cancer s/p surgery, Asthma, Chronic lymphedema, Kidney cancer s/p partial nephrectomy, anxiety, presenting to the ER  with blood in diaper when changed by HHA. appeared as dark red blood in diaper x 3 today. appears to be coming from vaginal area, not rectum. states increased urination yesterday. also reports chest pain started yesterday. nonradiating     Recs:  cardiac stable  no e/o acs  atypical cp. can defer ischemic eval at this time  cw gdmt for lv dysfunction  cw rate control meds for paf. ac on hold = resume when able  diuretics prn to maintain euvolemic  gyn consulted for vaginal bleeding. can proceed to embx without further cardiac workup  will follow          Over 25 minutes spent on total encounter; more than 50% of the visit was spent counseling and/or coordinating care by the attending physician.      Juan Salcedo MD   Cardiovascular Disease  [phone]
POD#1   HD#6    Patient seen and examined at bedside. No acute overnight events. No acute complaints, pain well controlled.  Patient is ambulating.  Tolerating regular diet. Denies  N/V, fevers, and chills.    Vital Signs Last 24 Hours  T(C): 36.6 (02-08-25 @ 04:54), Max: 37.3 (02-07-25 @ 17:35)  HR: 63 (02-08-25 @ 04:54) (62 - 87)  BP: 159/84 (02-08-25 @ 04:54) (138/64 - 178/74)  RR: 18 (02-08-25 @ 04:54) (17 - 18)  SpO2: 94% (02-08-25 @ 04:54) (92% - 98%)    I&O's Summary    07 Feb 2025 07:01  -  08 Feb 2025 07:00  --------------------------------------------------------  IN: 0 mL / OUT: 480 mL / NET: -480 mL        Physical Exam:  General: NAD  Lungs: non labored breathing, receiving nebulizer    Abdomen: Soft, non-tender, non-distended  : purewick in place with yellow urine in cannister, no vaginal bleeding noted  Ext: No pain or swelling    Labs:                        12.7   7.01  )-----------( 173      ( 08 Feb 2025 07:13 )             40.9   baso x      eos x      imm gran x      lymph x      mono x      poly x                            11.4   7.91  )-----------( 171      ( 07 Feb 2025 06:50 )             36.6   baso x      eos x      imm gran x      lymph x      mono x      poly x          MEDICATIONS  (STANDING):  amLODIPine   Tablet 5 milliGRAM(s) Oral daily  atorvastatin 40 milliGRAM(s) Oral at bedtime  buDESOnide    Inhalation Suspension 0.5 milliGRAM(s) Inhalation every 12 hours  busPIRone 10 milliGRAM(s) Oral <User Schedule>  DULoxetine 60 milliGRAM(s) Oral daily  gabapentin 100 milliGRAM(s) Oral three times a day  hydrALAZINE 75 milliGRAM(s) Oral two times a day  levothyroxine 125 MICROGram(s) Oral daily  metoprolol tartrate 25 milliGRAM(s) Oral two times a day  montelukast 10 milliGRAM(s) Oral daily  multivitamin 1 Tablet(s) Oral daily  nystatin/triamcinolone Cream 1 Application(s) Topical two times a day  pantoprazole  Injectable 40 milliGRAM(s) IV Push daily  sacubitril 24 mG/valsartan 26 mG 1 Tablet(s) Oral two times a day  tiotropium 2.5 MICROgram(s) Inhaler 2 Puff(s) Inhalation daily  zinc oxide 20% Ointment 1 Application(s) Topical two times a day    MEDICATIONS  (PRN):  acetaminophen     Tablet .. 650 milliGRAM(s) Oral every 6 hours PRN Temp greater or equal to 38C (100.4F), Mild Pain (1 - 3)  aluminum hydroxide/magnesium hydroxide/simethicone Suspension 30 milliLiter(s) Oral every 4 hours PRN Dyspepsia  melatonin 3 milliGRAM(s) Oral at bedtime PRN Insomnia  ondansetron Injectable 4 milliGRAM(s) IV Push every 8 hours PRN Nausea and/or Vomiting  traMADol 50 milliGRAM(s) Oral two times a day PRN Moderate Pain (4 - 6)

## 2025-02-13 NOTE — DISCHARGE NOTE PROVIDER - NSDCCAREPROVSEEN_GEN_ALL_CORE_FT
Kamila Welch Hoorbod Delshadfar  Hoorbod Delshadfar  Hoorbod Delshadfar  Josey Lund  . [facility]  [facility]      [ Greater than 35 min spent for discharge services.   DIANE Welch ]       ( Note written / Date of service is the same as last day of patient stay  in the hospital ( for billing purposes)))

## 2025-02-13 NOTE — CHART NOTE - NSCHARTNOTEFT_GEN_A_CORE
Patient is a 74y old  Female who presents with a chief complaint of poor social / living situation, vaginal bleed, pain (13 Feb 2025 07:08)    Notified by RN that patient complaining of chest pain. Patient seen at bedside, in NAD. Patient c/o chest pressure/tightness. Patient states she initially had the same chest tightness when she presented to the hospital, however it resolved and now has come back. Patient denies known provocative factors. Patient also c/o L arm tightness. Patient states she had tightness in her shoulder before, however now the tightness extends down her entire left arm. Denies injury to arm today. Denies SOB, palpitations, dizziness.     Physical Exam  General: NAD, awake and alert  CV: +S1S2  Resp: unlabored, CTA b/l  Extremities: +b/l LE swelling; LUE with full active ROM of elbow and wrist, limited shoulder active and passive ROM secondary to pain     Vital Signs Last 24 Hrs  T(C): 36.3 (13 Feb 2025 07:32), Max: 36.9 (12 Feb 2025 21:01)  T(F): 97.4 (13 Feb 2025 07:32), Max: 98.4 (12 Feb 2025 21:01)  HR: 67 (13 Feb 2025 07:32) (54 - 74)  BP: 103/49 (13 Feb 2025 07:32) (103/49 - 137/71)  BP(mean): --  RR: 18 (13 Feb 2025 07:32) (18 - 18)  SpO2: 95% (13 Feb 2025 07:32) (95% - 99%)    Parameters below as of 13 Feb 2025 07:32  Patient On (Oxygen Delivery Method): nasal cannula  O2 Flow (L/min): 3        Assessment & Plan:  HPI:  patient is a 74  year old woman, AAOX3, from home, wheelchair-bound and uses cane occasionally , w/ PMHx COPD on 3 L NC at home, Obesity, Hypothyroidism, Diastolic HF previously on Lasix, Afib on Eliquis, Breast cancer s/p surgery, Asthma, Chronic lymphedema, Kidney cancer s/p partial nephrectomy, anxiety, presenting to the ER  with blood in diaper when changed by HHA. appeared as dark red blood in diaper x 3 today. appears to be coming from vaginal area, not rectum. states increased urination yesterday. also reports chest pain started yesterday. nonradiating ( upon further questioning patient reports pain all over the body).  pelvic exam done by ER doctor limited exaxm, vaginal vault visualized in speculum, ?scant blood > CT scan ordered, pending  Upon further discussed with pt's son who is at bedside.. patient lives at home with , who has been gradually becoming more forgetful and falling a lot himself, and unable to care for himself or for he patient ( as the primary care taker for many years)...   pt's son Alexander offered fot them both to move in with him but they refused and patient and  have also been refusing to go to see their doctor and kept cancelling their appointments..  at this pont patient and son in agreement that patient and  ( also being admitted) would need to be placed together into a long term care center..    (03 Feb 2025 19:56)    > STAT EKG w/o acute ST/T wave changes  > STAT troponin   > IV Tylenol for pain  > Encouraged ROM to L arm  > If no improvement, can consider shoulder xray   > Continue to monitor and observe patient  > Endorsed to Day ACP; attending to follow     Tayo Arcos PA-C  Dept. of Medicine  [facility]

## 2025-02-13 NOTE — DISCHARGE NOTE NURSING/CASE MANAGEMENT/SOCIAL WORK - PATIENT PORTAL LINK FT
You can access the FollowMyHealth Patient Portal offered by [facility] by registering at the following website: [email or web address]. By joining [facility]’s FollowMyHealth portal, you will also be able to view your health information using other applications (apps) compatible with our system.

## 2025-02-13 NOTE — DISCHARGE NOTE PROVIDER - NSDCCPCAREPLAN_GEN_ALL_CORE_FT
PRINCIPAL DISCHARGE DIAGNOSIS  Diagnosis: Dysfunctional uterine bleeding  Assessment and Plan of Treatment: You presented with vaginal bleeding. A gynecology consult was obtained, and her Cancer Antigen 125 (CA-125) was within normal limits. CT scan performed showing air and fluid in endometrial cavity. Pelvic US then performed for better characterization but only transabdominal performed, which was very limited due to habitus. MRI performed for better characterization which showed no air in the endometrial cavity and a thin 2mm endometrial lining. OB/Gyn attempted exam under anesthesia that was limited by patients immobility, cervix unable to be localized, and biopsy not performed. Given reassuring MRI finding and inability to obtain endometrial biopsy, plan for interval ultrasounds for monitoring q3-6 months and the plan is for close outpatient follow-up, surveillance imaging, and consideration of a repeat biopsy.        SECONDARY DISCHARGE DIAGNOSES  Diagnosis: Problem related to living arrangement  Assessment and Plan of Treatment: You were seen by case management for placement to sub acute rehab .    Diagnosis: COPD, severe  Assessment and Plan of Treatment: Avoid environmental allergens and asthma triggers.  Participate in physical activity as tolerated.  Seek immediate medical attention if the shortness of breath does not improve with asthma treatments, or if you experience chest pain or palpitations.  Call an ambulance if your lips or nail beds become a bluish color.      Diagnosis: Lymphedema  Assessment and Plan of Treatment: You have lymphedema, previously treated with diuretics, will be managed with elevation, increased activity as tolerated, possible ACE wrapping, and diuretics as needed.    Diagnosis: Chronic atrial fibrillation  Assessment and Plan of Treatment: Atrial fibrillation is a common heart rhythm problem which increases the risk of stroke and heat attack.  It helps if you control your blood pressure, avoid alcohol, cut down on caffeine, get treatment for your thyroid if it is overactive, and perform moderate exercise in consultation with your Primary Care Provider.  Call your doctor if you experience chest tightness/pain, lightheadedness, loss of consciousness, shortness of breath (especially with exercise), feel your heart racing or beating unusually, frequent or abnormal bleeding.  It is important to take all your heart medications as prescribed.  Continue Eliquis

## 2025-02-18 NOTE — ED ADULT TRIAGE NOTE - TEMPERATURE IN CELSIUS (DEGREES C)
What Type Of Note Output Would You Prefer (Optional)?: Bullet Format
What Is The Reason For Today's Visit?: Full Body Skin Examination
What Is The Reason For Today's Visit? (Being Monitored For X): the re-examination of lesions previously examined
36.5
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
## 2025-04-01 NOTE — ED ADULT NURSE NOTE - EXTENSIONS OF SELF_ADULT
Patient tolerated injection well. Patient advised to wait 20 minutes in the office following the injection. No signs/symptoms of reaction noted after 20 minutes.  Administrations This Visit       ALLERGEN EXTRACT 0.35 mL       Admin Date  04/01/2025  13:51 Action  Given Dose  0.35 mL Route  SubCUTAneous Site  Arm Right Documented By  Kaleb Taylor LPN    NDC: [address]-14    Patient Supplied?: Yes                    
None

## 2025-04-17 NOTE — PATIENT PROFILE ADULT - DO YOU FEEL UNSAFE AT SCHOOL?
Patient notified.       Your thyroid ultrasound did come back looking very normal.  It is homogeneous means all the same texture, this is good news means you do not likely have a autoimmune thyroid cause, there are also no nodules to explain your trouble with swallowing and it was not enlarged.  There were no lymph nodes noted     Overall very normal   not applicable

## 2025-04-22 NOTE — CONSULT NOTE ADULT - REASON FOR ADMISSION
poor social / living situation, vaginal bleed, pain
poor social / living situation, vaginal bleed, pain
3 = A little assistance

## 2025-04-26 NOTE — H&P ADULT - PROBLEM SELECTOR PLAN 4
- -c/w baclofen  -c/w gabapentin tid  -c/w oxy IR 5 q6 as do not want to precipitate withdrawal.  -ISTOP Reference #: [id number]  it appears she has long standing oxy IR 5 q6 hr dosing as well as ativan 0.5-1 mg bid dosing  -consider pain mgt eval. -c/w baclofen  -c/w gabapentin tid  -c/w oxy IR 5 q6 as do not want to precipitate withdrawal.  -ISTOP Reference #: [id number]  it appears she has long standing oxy IR 5 q6 hr dosing as well as ativan 0.5-1 mg q6hr prn dosing  -consider pain mgt eval. [facility]
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
## 2025-06-17 NOTE — ED ADULT NURSE NOTE - CAS EDN DISCHARGE ASSESSMENT
Orphenadrine  mg  Filled 5-19-25  Qty 60  0 refills  Future Appointments   Date Time Provider Department Center   7/28/2025  7:30 AM Kathleen Buitrago APRN [facility]   LOV 6-22-24   
Alert and oriented to person, place and time/Awake

## (undated) DEVICE — DRAPE LITHOTOMY PERI-GYN

## (undated) DEVICE — PACK LITHOTOMY

## (undated) DEVICE — GOWN LG

## (undated) DEVICE — DRAPE TOWEL BLUE 17" X 24"

## (undated) DEVICE — SOL IRR POUR NS 0.9% 500ML

## (undated) DEVICE — POSITIONER FOAM EGG CRATE ULNAR 2PCS (PINK)

## (undated) DEVICE — SPECIMEN CONTAINER 100ML

## (undated) DEVICE — GLV 6.5 PROTEXIS (WHITE)

## (undated) DEVICE — VENODYNE/SCD SLEEVE CALF MEDIUM

## (undated) DEVICE — DRSG TELFA 3 X 8

## (undated) DEVICE — DRAPE LIGHT HANDLE COVER (BLUE)

## (undated) DEVICE — PREP CHLOROHEXIDINE 4% 118CC KIT

## (undated) DEVICE — FOLEY TRAY 16FR 5CC LTX UMETER CLOSED

## (undated) DEVICE — GLV 7 PROTEXIS (WHITE)

## (undated) DEVICE — GLV 8 PROTEXIS (WHITE)

## (undated) DEVICE — VISITEC 4X4

## (undated) DEVICE — GLV 7.5 PROTEXIS (WHITE)

## (undated) DEVICE — GOWN TRIMAX LG

## (undated) DEVICE — LAP PAD 18 X 18"

## (undated) DEVICE — WARMING BLANKET UPPER ADULT

## (undated) DEVICE — TUBING UTERINE ASPIRATION 3/8" X 6FT W/O ADAPTER

## (undated) DEVICE — SOL IRR POUR H2O 250ML

## (undated) DEVICE — MEDICATION LABELS W MARKER

## (undated) DEVICE — GLV 8.5 PROTEXIS (WHITE)